# Patient Record
Sex: MALE | Race: BLACK OR AFRICAN AMERICAN | Employment: OTHER | URBAN - METROPOLITAN AREA
[De-identification: names, ages, dates, MRNs, and addresses within clinical notes are randomized per-mention and may not be internally consistent; named-entity substitution may affect disease eponyms.]

---

## 2018-02-28 ENCOUNTER — APPOINTMENT (OUTPATIENT)
Dept: GENERAL RADIOLOGY | Age: 48
DRG: 194 | End: 2018-02-28
Attending: PHYSICIAN ASSISTANT
Payer: MEDICAID

## 2018-02-28 ENCOUNTER — HOSPITAL ENCOUNTER (INPATIENT)
Age: 48
LOS: 7 days | Discharge: HOME HEALTH | DRG: 194 | End: 2018-03-07
Attending: EMERGENCY MEDICINE | Admitting: INTERNAL MEDICINE
Payer: MEDICAID

## 2018-02-28 DIAGNOSIS — N17.9 ACUTE RENAL FAILURE SUPERIMPOSED ON STAGE 3 CHRONIC KIDNEY DISEASE, UNSPECIFIED ACUTE RENAL FAILURE TYPE: ICD-10-CM

## 2018-02-28 DIAGNOSIS — I50.43 ACUTE ON CHRONIC COMBINED SYSTOLIC AND DIASTOLIC CONGESTIVE HEART FAILURE (HCC): Primary | ICD-10-CM

## 2018-02-28 DIAGNOSIS — N18.3 ACUTE RENAL FAILURE SUPERIMPOSED ON STAGE 3 CHRONIC KIDNEY DISEASE, UNSPECIFIED ACUTE RENAL FAILURE TYPE: ICD-10-CM

## 2018-02-28 DIAGNOSIS — E87.6 HYPOKALEMIA: ICD-10-CM

## 2018-02-28 DIAGNOSIS — D63.8 ANEMIA OF CHRONIC DISEASE: ICD-10-CM

## 2018-02-28 PROBLEM — N18.9 ACUTE ON CHRONIC RENAL INSUFFICIENCY: Status: ACTIVE | Noted: 2018-02-28

## 2018-02-28 PROBLEM — N28.9 ACUTE ON CHRONIC RENAL INSUFFICIENCY: Status: ACTIVE | Noted: 2018-02-28

## 2018-02-28 PROBLEM — E87.1 HYPONATREMIA: Status: ACTIVE | Noted: 2018-02-28

## 2018-02-28 PROBLEM — I50.9 ACUTE EXACERBATION OF CHF (CONGESTIVE HEART FAILURE) (HCC): Status: ACTIVE | Noted: 2018-02-28

## 2018-02-28 LAB
ALBUMIN SERPL-MCNC: 1.8 G/DL (ref 3.4–5)
ALBUMIN/GLOB SERPL: 0.4 {RATIO} (ref 0.8–1.7)
ALP SERPL-CCNC: 142 U/L (ref 45–117)
ALT SERPL-CCNC: 26 U/L (ref 16–61)
AMORPH CRY URNS QL MICRO: ABNORMAL
ANION GAP SERPL CALC-SCNC: 10 MMOL/L (ref 3–18)
APPEARANCE UR: CLEAR
APTT PPP: 32.9 SEC (ref 23–36.4)
AST SERPL-CCNC: 22 U/L (ref 15–37)
BACTERIA URNS QL MICRO: ABNORMAL /HPF
BASOPHILS # BLD: 0 K/UL (ref 0–0.06)
BASOPHILS NFR BLD: 0 % (ref 0–2)
BILIRUB SERPL-MCNC: 0.2 MG/DL (ref 0.2–1)
BILIRUB UR QL: NEGATIVE
BNP SERPL-MCNC: ABNORMAL PG/ML (ref 0–450)
BUN SERPL-MCNC: 42 MG/DL (ref 7–18)
BUN/CREAT SERPL: 14 (ref 12–20)
CALCIUM SERPL-MCNC: 7.5 MG/DL (ref 8.5–10.1)
CHLORIDE SERPL-SCNC: 120 MMOL/L (ref 100–108)
CK MB CFR SERPL CALC: 0.7 % (ref 0–4)
CK MB SERPL-MCNC: 4.1 NG/ML (ref 5–25)
CK SERPL-CCNC: 570 U/L (ref 39–308)
CO2 SERPL-SCNC: 17 MMOL/L (ref 21–32)
COLOR UR: YELLOW
CREAT SERPL-MCNC: 3.03 MG/DL (ref 0.6–1.3)
DIFFERENTIAL METHOD BLD: ABNORMAL
EOSINOPHIL # BLD: 0.2 K/UL (ref 0–0.4)
EOSINOPHIL NFR BLD: 3 % (ref 0–5)
EPITH CASTS URNS QL MICRO: ABNORMAL /LPF (ref 0–5)
ERYTHROCYTE [DISTWIDTH] IN BLOOD BY AUTOMATED COUNT: 14.1 % (ref 11.6–14.5)
EST. AVERAGE GLUCOSE BLD GHB EST-MCNC: 100 MG/DL
GLOBULIN SER CALC-MCNC: 4.9 G/DL (ref 2–4)
GLUCOSE BLD STRIP.AUTO-MCNC: 143 MG/DL (ref 70–110)
GLUCOSE BLD STRIP.AUTO-MCNC: 96 MG/DL (ref 70–110)
GLUCOSE SERPL-MCNC: 89 MG/DL (ref 74–99)
GLUCOSE UR STRIP.AUTO-MCNC: NEGATIVE MG/DL
HBA1C MFR BLD: 5.1 % (ref 4.5–5.6)
HCT VFR BLD AUTO: 25.3 % (ref 36–48)
HGB BLD-MCNC: 8.3 G/DL (ref 13–16)
HGB UR QL STRIP: ABNORMAL
INR PPP: 1.1 (ref 0.8–1.2)
KETONES UR QL STRIP.AUTO: NEGATIVE MG/DL
LEUKOCYTE ESTERASE UR QL STRIP.AUTO: NEGATIVE
LIPASE SERPL-CCNC: 117 U/L (ref 73–393)
LYMPHOCYTES # BLD: 1 K/UL (ref 0.9–3.6)
LYMPHOCYTES NFR BLD: 16 % (ref 21–52)
MCH RBC QN AUTO: 26.8 PG (ref 24–34)
MCHC RBC AUTO-ENTMCNC: 32.8 G/DL (ref 31–37)
MCV RBC AUTO: 81.6 FL (ref 74–97)
MONOCYTES # BLD: 0.6 K/UL (ref 0.05–1.2)
MONOCYTES NFR BLD: 9 % (ref 3–10)
NEUTS SEG # BLD: 4.7 K/UL (ref 1.8–8)
NEUTS SEG NFR BLD: 72 % (ref 40–73)
NITRITE UR QL STRIP.AUTO: NEGATIVE
PH UR STRIP: 5 [PH] (ref 5–8)
PLATELET # BLD AUTO: 237 K/UL (ref 135–420)
PMV BLD AUTO: 10 FL (ref 9.2–11.8)
POTASSIUM SERPL-SCNC: 3.2 MMOL/L (ref 3.5–5.5)
PROT SERPL-MCNC: 6.7 G/DL (ref 6.4–8.2)
PROT UR STRIP-MCNC: >1000 MG/DL
PROTHROMBIN TIME: 13.3 SEC (ref 11.5–15.2)
RBC # BLD AUTO: 3.1 M/UL (ref 4.7–5.5)
RBC #/AREA URNS HPF: ABNORMAL /HPF (ref 0–5)
SODIUM SERPL-SCNC: 147 MMOL/L (ref 136–145)
SP GR UR REFRACTOMETRY: 1.01 (ref 1–1.03)
TROPONIN I SERPL-MCNC: 0.02 NG/ML (ref 0–0.06)
UROBILINOGEN UR QL STRIP.AUTO: 0.2 EU/DL (ref 0.2–1)
WBC # BLD AUTO: 6.6 K/UL (ref 4.6–13.2)
WBC URNS QL MICRO: ABNORMAL /HPF (ref 0–5)

## 2018-02-28 PROCEDURE — 83036 HEMOGLOBIN GLYCOSYLATED A1C: CPT | Performed by: PHYSICIAN ASSISTANT

## 2018-02-28 PROCEDURE — 96374 THER/PROPH/DIAG INJ IV PUSH: CPT

## 2018-02-28 PROCEDURE — 99285 EMERGENCY DEPT VISIT HI MDM: CPT

## 2018-02-28 PROCEDURE — 85025 COMPLETE CBC W/AUTO DIFF WBC: CPT | Performed by: PHYSICIAN ASSISTANT

## 2018-02-28 PROCEDURE — 93005 ELECTROCARDIOGRAM TRACING: CPT

## 2018-02-28 PROCEDURE — 65660000000 HC RM CCU STEPDOWN

## 2018-02-28 PROCEDURE — 74011250636 HC RX REV CODE- 250/636: Performed by: INTERNAL MEDICINE

## 2018-02-28 PROCEDURE — 82962 GLUCOSE BLOOD TEST: CPT

## 2018-02-28 PROCEDURE — 71045 X-RAY EXAM CHEST 1 VIEW: CPT

## 2018-02-28 PROCEDURE — 83690 ASSAY OF LIPASE: CPT | Performed by: PHYSICIAN ASSISTANT

## 2018-02-28 PROCEDURE — 74011250637 HC RX REV CODE- 250/637: Performed by: INTERNAL MEDICINE

## 2018-02-28 PROCEDURE — 81001 URINALYSIS AUTO W/SCOPE: CPT | Performed by: PHYSICIAN ASSISTANT

## 2018-02-28 PROCEDURE — 74011250637 HC RX REV CODE- 250/637: Performed by: PHYSICIAN ASSISTANT

## 2018-02-28 PROCEDURE — 83880 ASSAY OF NATRIURETIC PEPTIDE: CPT | Performed by: PHYSICIAN ASSISTANT

## 2018-02-28 PROCEDURE — 82550 ASSAY OF CK (CPK): CPT | Performed by: PHYSICIAN ASSISTANT

## 2018-02-28 PROCEDURE — 85610 PROTHROMBIN TIME: CPT | Performed by: PHYSICIAN ASSISTANT

## 2018-02-28 PROCEDURE — 85730 THROMBOPLASTIN TIME PARTIAL: CPT | Performed by: PHYSICIAN ASSISTANT

## 2018-02-28 PROCEDURE — 80053 COMPREHEN METABOLIC PANEL: CPT | Performed by: PHYSICIAN ASSISTANT

## 2018-02-28 PROCEDURE — 74011250636 HC RX REV CODE- 250/636: Performed by: PHYSICIAN ASSISTANT

## 2018-02-28 RX ORDER — HEPARIN SODIUM 5000 [USP'U]/ML
5000 INJECTION, SOLUTION INTRAVENOUS; SUBCUTANEOUS EVERY 8 HOURS
Status: DISCONTINUED | OUTPATIENT
Start: 2018-02-28 | End: 2018-03-04

## 2018-02-28 RX ORDER — ACETAMINOPHEN 325 MG/1
650 TABLET ORAL
Status: DISCONTINUED | OUTPATIENT
Start: 2018-02-28 | End: 2018-03-07 | Stop reason: HOSPADM

## 2018-02-28 RX ORDER — POTASSIUM CHLORIDE 20 MEQ/1
40 TABLET, EXTENDED RELEASE ORAL
Status: COMPLETED | OUTPATIENT
Start: 2018-02-28 | End: 2018-02-28

## 2018-02-28 RX ORDER — CARVEDILOL 12.5 MG/1
25 TABLET ORAL ONCE
Status: COMPLETED | OUTPATIENT
Start: 2018-02-28 | End: 2018-02-28

## 2018-02-28 RX ORDER — FUROSEMIDE 10 MG/ML
80 INJECTION INTRAMUSCULAR; INTRAVENOUS
Status: COMPLETED | OUTPATIENT
Start: 2018-02-28 | End: 2018-02-28

## 2018-02-28 RX ORDER — CARVEDILOL 12.5 MG/1
25 TABLET ORAL 2 TIMES DAILY WITH MEALS
Status: DISCONTINUED | OUTPATIENT
Start: 2018-02-28 | End: 2018-03-07 | Stop reason: HOSPADM

## 2018-02-28 RX ORDER — POTASSIUM CHLORIDE 7.45 MG/ML
10 INJECTION INTRAVENOUS
Status: COMPLETED | OUTPATIENT
Start: 2018-02-28 | End: 2018-02-28

## 2018-02-28 RX ORDER — FUROSEMIDE 10 MG/ML
40 INJECTION INTRAMUSCULAR; INTRAVENOUS 2 TIMES DAILY
Status: COMPLETED | OUTPATIENT
Start: 2018-02-28 | End: 2018-03-01

## 2018-02-28 RX ADMIN — POTASSIUM CHLORIDE 40 MEQ: 20 TABLET, EXTENDED RELEASE ORAL at 16:04

## 2018-02-28 RX ADMIN — FUROSEMIDE 80 MG: 10 INJECTION, SOLUTION INTRAMUSCULAR; INTRAVENOUS at 11:34

## 2018-02-28 RX ADMIN — POTASSIUM CHLORIDE 40 MEQ: 20 TABLET, EXTENDED RELEASE ORAL at 13:50

## 2018-02-28 RX ADMIN — HEPARIN SODIUM 5000 UNITS: 5000 INJECTION, SOLUTION INTRAVENOUS; SUBCUTANEOUS at 22:08

## 2018-02-28 RX ADMIN — HEPARIN SODIUM 5000 UNITS: 5000 INJECTION, SOLUTION INTRAVENOUS; SUBCUTANEOUS at 16:05

## 2018-02-28 RX ADMIN — CARVEDILOL 25 MG: 12.5 TABLET, FILM COATED ORAL at 13:50

## 2018-02-28 RX ADMIN — FUROSEMIDE 40 MG: 10 INJECTION, SOLUTION INTRAMUSCULAR; INTRAVENOUS at 22:06

## 2018-02-28 RX ADMIN — CARVEDILOL 25 MG: 12.5 TABLET, FILM COATED ORAL at 17:54

## 2018-02-28 RX ADMIN — POTASSIUM CHLORIDE 10 MEQ: 10 INJECTION, SOLUTION INTRAVENOUS at 13:58

## 2018-02-28 NOTE — H&P
History & Physical    Patient: Arie Miller MRN: [id number]  CSN: [id number]    YOB: 1970  Age: 41 y.o. Sex: male      DOA: 2/28/2018  Primary Care Provider:  Deanna Almanza MD      Assessment/Plan     Patient Active Problem List   Diagnosis Code    Hyponatremia E87.1    Anemia of chronic disease D63.8    Acute on chronic renal insufficiency N28.9, N18.9    Acute exacerbation of CHF (congestive heart failure) ([facility]) I50.9       42 y/o male with advanced CKD and CHF with an exacerbation in his CHF. EF 60% in 7/17/17 at [facility] in [facility] tab. Less that perfect medication compliance noted. Recently missed diuretic doses as he was feeling poorly. 1.  CHF Diastolic Acute EF 22%. 2.  CKD stage 3-4.  3.  HTN. 4.  Anemia of chronic kidney disease. 5. Hx BKA. -Diurese with Furosemide. I/o. Daily weight.  -Follow renal fxn. ESRD probably not too far down the [facility] [address]. Should be followed bya nephrologist if possible. -BP mgmt.  -Anema of CKD noted. -Seems to be doing poorly overall.   -Dvt px.  -Dispo 3-4d. Estimated length of stay : 3d    CC: Dyspnea. HPI:     Arie Miller is a 41 y.o. male with a pmhx of chf, ckd, htn, and chronic anemia who presents with worsening lower extremity edema and dyspnea. Has been evolving over the past couple days. No associated chest pain. It appears he has taken his lasix sporadically. Report admission for similar problem in November 2017 at [facility].      In ER noted to have massive [place] [address]. CXR with interstitial edema and pleural effusion. Ths far in the ER he has received carvedilol and 80 mg of intravenous furosemide.        Past Medical History:   Diagnosis Date    Chronic kidney disease     Diabetes ([address]     Heart failure ([address]     Hypertension        Past Surgical History:   Procedure Laterality Date    HX ORTHOPAEDIC      right BKA 2017       History reviewed. No pertinent family history. Social History     Social History    Marital status:      Spouse name: N/A    Number of children: N/A    Years of education: N/A     Social History Main Topics    Smoking status: Former Smoker    Smokeless tobacco: Never Used    Alcohol use No    Drug use: No    Sexual activity: Not Asked     Other Topics Concern    None     Social History Narrative    None       Prior to Admission medications    Medication Sig Start Date End Date Taking? Authorizing Provider   FUROSEMIDE (LASIX PO) Take  by mouth. Yes Phys Other, MD       No Known Allergies    Review of Systems  Gen: No fever, chills, malaise, weight loss/gain. Heent: No headache, rhinorrhea, epistaxis, ear pain, hearing loss, sinus pain, neck pain/stiffness, sore throat. Heart: No chest pain, palpitations, FU, pnd, or orthopnea. Resp: see hpi   GI: No nausea, vomiting, diarrhea, constipation, melena or hematochezia. : No urinary obstruction, dysuria or hematuria. Derm: No rash, new skin lesion or pruritis. Musc/skeletal: no bone or joint complains. Vasc: No edema, cyanosis or claudication. Endo: No heat/cold intolerance, no polyuria,polydipsia or polyphagia. Neuro: No unilateral weakness, numbness, tingling. No seizures. Heme: No easy bruising or bleeding. Physical Exam:     Physical Exam:  Visit Vitals    /89    Pulse 96    Temp 98 °F (36.7 °C)    Resp 23    Ht 5' 7\" (1.702 m)    Wt 113.4 kg (250 lb)    SpO2 100%    BMI 39.16 kg/m2      O2 Device: Room air    Temp (24hrs), Av °F (36.7 °C), Min:98 °F (36.7 °C), Max:98 °F (36.7 °C)     07 -  1900  In: -   Out: 500 [Urine:500]        General:  Awake, cooperative, no distress. Head:  Normocephalic, without obvious abnormality, atraumatic. Eyes:  Conjunctivae/corneas clear, sclera anicteric, PERRL, EOMs intact. Nose: Nares normal. No drainage or sinus tenderness.    Throat: Lips, mucosa, and tongue normal.    Neck: Supple, symmetrical, trachea midline, no adenopathy. Lungs:   Rales b/l. Heart:  Regular rate and rhythm, S1, S2 normal, no murmur, click, rub or gallop. Abdomen: Soft, non-tender. Bowel sounds normal. No masses,  No organomegaly. Extremities: Edema. Pulses: 2+ and symmetric all extremities. Skin: Skin color pink/pale/mottled/flushed, turgor normal. No rashes or lesions   Neurologic: CNII-XII intact. No focal motor or sensory deficit. Labs Reviewed: All lab results for the last 24 hours reviewed. Recent Results (from the past 24 hour(s))   CBC WITH AUTOMATED DIFF    Collection Time: 02/28/18 10:50 AM   Result Value Ref Range    WBC 6.6 4.6 - 13.2 K/uL    RBC 3.10 (L) 4.70 - 5.50 M/uL    HGB 8.3 (L) 13.0 - 16.0 g/dL    HCT 25.3 (L) 36.0 - 48.0 %    MCV 81.6 74.0 - 97.0 FL    MCH 26.8 24.0 - 34.0 PG    MCHC 32.8 31.0 - 37.0 g/dL    RDW 14.1 11.6 - 14.5 %    PLATELET 878 862 - 906 K/uL    MPV 10.0 9.2 - 11.8 FL    NEUTROPHILS 72 40 - 73 %    LYMPHOCYTES 16 (L) 21 - 52 %    MONOCYTES 9 3 - 10 %    EOSINOPHILS 3 0 - 5 %    BASOPHILS 0 0 - 2 %    ABS. NEUTROPHILS 4.7 1.8 - 8.0 K/UL    ABS. LYMPHOCYTES 1.0 0.9 - 3.6 K/UL    ABS. MONOCYTES 0.6 0.05 - 1.2 K/UL    ABS. EOSINOPHILS 0.2 0.0 - 0.4 K/UL    ABS. BASOPHILS 0.0 0.0 - 0.06 K/UL    DF AUTOMATED     METABOLIC PANEL, COMPREHENSIVE    Collection Time: 02/28/18 10:50 AM   Result Value Ref Range    Sodium 147 (H) 136 - 145 mmol/L    Potassium 3.2 (L) 3.5 - 5.5 mmol/L    Chloride 120 (H) 100 - 108 mmol/L    CO2 17 (L) 21 - 32 mmol/L    Anion gap 10 3.0 - 18 mmol/L    Glucose 89 74 - 99 mg/dL    BUN 42 (H) 7.0 - 18 MG/DL    Creatinine 3.03 (H) 0.6 - 1.3 MG/DL    BUN/Creatinine ratio 14 12 - 20      GFR est AA 27 (L) >60 ml/min/1.73m2    GFR est non-AA 22 (L) >60 ml/min/1.73m2    Calcium 7.5 (L) 8.5 - 10.1 MG/DL    Bilirubin, total 0.2 0.2 - 1.0 MG/DL    ALT (SGPT) 26 16 - 61 U/L    AST (SGOT) 22 15 - 37 U/L    Alk. phosphatase 142 (H) 45 - 117 U/L    Protein, total 6.7 6.4 - 8.2 g/dL    Albumin 1.8 (L) 3.4 - 5.0 g/dL    Globulin 4.9 (H) 2.0 - 4.0 g/dL    A-G Ratio 0.4 (L) 0.8 - 1.7     NT-PRO BNP    Collection Time: 02/28/18 10:50 AM   Result Value Ref Range    NT pro-BNP 04287 (H) 0 - 450 PG/ML   CARDIAC PANEL,(CK, CKMB & TROPONIN)    Collection Time: 02/28/18 10:50 AM   Result Value Ref Range     (H) 39 - 308 U/L    CK - MB 4.1 (H) <3.6 ng/ml    CK-MB Index 0.7 0.0 - 4.0 %    Troponin-I, Qt. 0.02 0.00 - 0.06 NG/ML   HEMOGLOBIN A1C WITH EAG    Collection Time: 02/28/18 10:50 AM   Result Value Ref Range    Hemoglobin A1c 5.1 4.5 - 5.6 %    Est. average glucose 100 mg/dL   LIPASE    Collection Time: 02/28/18 10:50 AM   Result Value Ref Range    Lipase 117 73 - 393 U/L   PROTHROMBIN TIME + INR    Collection Time: 02/28/18 10:50 AM   Result Value Ref Range    Prothrombin time 13.3 11.5 - 15.2 sec    INR 1.1 0.8 - 1.2     PTT    Collection Time: 02/28/18 10:50 AM   Result Value Ref Range    aPTT 32.9 23.0 - 36.4 SEC   EKG, 12 LEAD, INITIAL    Collection Time: 02/28/18 10:52 AM   Result Value Ref Range    Ventricular Rate 95 BPM    Atrial Rate 95 BPM    P-R Interval 150 ms    QRS Duration 74 ms    Q-T Interval 380 ms    QTC Calculation (Bezet) 477 ms    Calculated P Axis 79 degrees    Calculated R Axis 30 degrees    Calculated T Axis -176 degrees    Diagnosis       Normal sinus rhythm  ST & T wave abnormality, consider lateral ischemia  Prolonged QT  Abnormal ECG  No previous ECGs available     URINALYSIS W/ RFLX MICROSCOPIC    Collection Time: 02/28/18  1:25 PM   Result Value Ref Range    Color YELLOW      Appearance CLEAR      Specific gravity 1.013 1.005 - 1.030      pH (UA) 5.0 5.0 - 8.0      Protein >1000 (A) NEG mg/dL    Glucose NEGATIVE  NEG mg/dL    Ketone NEGATIVE  NEG mg/dL    Bilirubin NEGATIVE  NEG      Blood MODERATE (A) NEG      Urobilinogen 0.2 0.2 - 1.0 EU/dL    Nitrites NEGATIVE  NEG      Leukocyte Esterase NEGATIVE  NEG               CC: Bharath Alston MD

## 2018-02-28 NOTE — IP AVS SNAPSHOT
[address] [address] 
[phone] Patient: Marco Mcdonald. MRN: [id number] YVM:7/90/1149 About your hospitalization You were admitted on:  February 28, 2018 You last received care in the:  [address] [place] [address] You were discharged on:  March 7, 2018 Why you were hospitalized Your primary diagnosis was:  Acute Exacerbation Of Chf (Congestive Heart Failure) (Hcc) Your diagnoses also included:  Hyponatremia, Anemia Of Chronic Disease, Acute On Chronic Renal Insufficiency, Iron Deficiency Anemia, Other Restrictive Cardiomyopathy (Hcc) Follow-up Information Follow up With Details Comments Contact Info [address] [facility] [place]note 2],[address] to continue managing your healthcare needs. [phone]  
 pcm Fredrick Mcarthur MD In 3 days   [address] [place] [facility] 
[phone] 
  
 nephrology In 1 week    
 cardiology In 1 week Discharge Orders None A check jennifer indicates which time of day the medication should be taken. My Medications START taking these medications Instructions Each Dose to Equal  
 Morning Noon Evening Bedtime  
 amLODIPine 10 mg tablet Commonly known as:  Sangeeta Slate Start taking on:  3/8/2018 Your last dose was: Your next dose is: Take 1 Tab by mouth daily. 10 mg  
    
   
   
   
  
 carvedilol 25 mg tablet Commonly known as:  Jeannie Knapp Your last dose was: Your next dose is: Take 1 Tab by mouth two (2) times daily (with meals). 25 mg  
    
   
   
   
  
 hydrALAZINE 25 mg tablet Commonly known as:  APRESOLINE Your last dose was: Your next dose is: Take 1 Tab by mouth three (3) times daily. 25 mg  
    
   
   
   
  
 isosorbide mononitrate ER 60 mg CR tablet Commonly known as:  IMDUR Start taking on:  3/8/2018 Your last dose was: Your next dose is: Take 1 Tab by mouth daily. 60 mg  
    
   
   
   
  
 loperamide 2 mg capsule Commonly known as:  IMODIUM Your last dose was: Your next dose is: Take 1 Cap by mouth three (3) times daily as needed for Diarrhea for up to 10 days. Indications: Diarrhea  
 2 mg Saccharomyces boulardii 250 mg capsule Commonly known as:  Osmani Villasenor Your last dose was: Your next dose is: Take 2 Caps by mouth two (2) times a day for 7 days. 500 mg CHANGE how you take these medications Instructions Each Dose to Equal  
 Morning Noon Evening Bedtime  
 furosemide 80 mg tablet Commonly known as:  LASIX What changed:   
- medication strength 
- how much to take - when to take this Your last dose was: Your next dose is: Take 1 Tab by mouth two (2) times a day. 80 mg Where to Get Your Medications These medications were sent to [address] [place] [address]. Shantal [address] [place] [address] Phone:  [phone]  
  amLODIPine 10 mg tablet  
 carvedilol 25 mg tablet  
 furosemide 80 mg tablet  
 hydrALAZINE 25 mg tablet  
 isosorbide mononitrate ER 60 mg CR tablet  
 loperamide 2 mg capsule Saccharomyces boulardii 250 mg capsule Discharge Instructions DISCHARGE SUMMARY from Nurse PATIENT INSTRUCTIONS: 
 
Recommended activity: Activity as tolerated. *  Please give a list of your current medications to your Primary Care Provider. *  Please update this list whenever your medications are discontinued, doses are 
    changed, or new medications (including over-the-counter products) are added. *  Please carry medication information at all times in case of emergency situations. These are general instructions for a healthy lifestyle: No smoking/ No tobacco products/ Avoid exposure to second hand smoke Surgeon General's Warning:  Quitting smoking now greatly reduces serious risk to your health. Obesity, smoking, and sedentary lifestyle greatly increases your risk for illness A healthy diet, regular physical exercise & weight monitoring are important for maintaining a healthy lifestyle You may be retaining fluid if you have a history of heart failure or if you experience any of the following symptoms:  Weight gain of 3 pounds or more overnight or 5 pounds in a week, increased swelling in our hands or feet or shortness of breath while lying flat in bed. Please call your doctor as soon as you notice any of these symptoms; do not wait until your next office visit. Recognize signs and symptoms of STROKE: 
 
F-face looks uneven A-arms unable to move or move unevenly S-speech slurred or non-existent T-time-call 911 as soon as signs and symptoms begin-DO NOT go Back to bed or wait to see if you get better-TIME IS BRAIN. Warning Signs of HEART ATTACK Call 911 if you have these symptoms: 
? Chest discomfort. Most heart attacks involve discomfort in the center of the chest that lasts more than a few minutes, or that goes away and comes back. It can feel like uncomfortable pressure, squeezing, fullness, or pain. ? Discomfort in other areas of the upper body. Symptoms can include pain or discomfort in one or both arms, the back, neck, jaw, or stomach. ? Shortness of breath with or without chest discomfort. ? Other signs may include breaking out in a cold sweat, nausea, or lightheadedness. Don't wait more than five minutes to call [address]! Fast action can save your life. Calling 911 is almost always the fastest way to get lifesaving treatment.  Emergency Medical Services staff can begin treatment when they arrive  up to an hour sooner than if someone gets to the hospital by car. The discharge information has been reviewed with the {PATIENT PARENT GUARDIAN:59772}. The {PATIENT PARENT GUARDIAN:94495} verbalized understanding. Discharge medications reviewed with the {Dishcarge meds reviewed NYFQ:22450} and appropriate educational materials and side effects teaching were provided. ___________________________________________________________________________________________________________________________________ Heart Failure: Care Instructions Your Care Instructions Heart failure occurs when your heart does not pump as much blood as the body needs. Failure does not mean that the heart has stopped pumping but rather that it is not pumping as well as it should. Over time, this causes fluid buildup in your lungs and other parts of your body. Fluid buildup can cause shortness of breath, fatigue, swollen ankles, and other problems. By taking medicines regularly, reducing sodium (salt) in your diet, checking your weight every day, and making lifestyle changes, you can feel better and live longer. Follow-up care is a key part of your treatment and safety. Be sure to make and go to all appointments, and call your doctor if you are having problems. It's also a good idea to know your test results and keep a list of the medicines you take. How can you care for yourself at home? Medicines ? · Be safe with medicines. Take your medicines exactly as prescribed. Call your doctor if you think you are having a problem with your medicine. ? · Do not take any vitamins, over-the-counter medicine, or herbal products without talking to your doctor first. Cem Erika not take ibuprofen (Advil or Motrin) and naproxen (Aleve) without talking to your doctor first. They could make your heart failure worse. ? · You may be taking some of the following medicine. ¨ Beta-blockers can slow heart rate, decrease blood pressure, and improve your condition. Taking a beta-blocker may lower your chance of needing to be hospitalized. ¨ Angiotensin-converting enzyme inhibitors (ACEIs) reduce the heart's workload, lower blood pressure, and reduce swelling. Taking an ACEI may lower your chance of needing to be hospitalized again. ¨ Angiotensin II receptor blockers (ARBs) work like ACEIs. Your doctor may prescribe them instead of ACEIs. ¨ Diuretics, also called water pills, reduce swelling. ¨ Potassium supplements replace this important mineral, which is sometimes lost with diuretics. ¨ Aspirin and other blood thinners prevent blood clots, which can cause a stroke or heart attack. ? You will get more details on the specific medicines your doctor prescribes. Diet ? · Your doctor may suggest that you limit sodium to 2,000 milligrams (mg) a day or less. That is less than 1 teaspoon of salt a day, including all the salt you eat in cooking or in packaged foods. People get most of their sodium from processed foods. Fast food and restaurant meals also tend to be very high in sodium. ? · Ask your doctor how much liquid you can drink each day. You may have to limit liquids. ?Weight ? · Weigh yourself without clothing at the same time each day. Record your weight. Call your doctor if you have a sudden weight gain, such as more than 2 to 3 pounds in a day or 5 pounds in a week. (Your doctor may suggest a different range of weight gain.) A sudden weight gain may mean that your heart failure is getting worse. ? Activity level ? · Start light exercise (if your doctor says it is okay). Even if you can only do a small amount, exercise will help you get stronger, have more energy, and manage your weight and your stress. Walking is an easy way to get exercise. Start out by walking a little more than you did before. Bit by bit, increase the amount you walk. ? · When you exercise, watch for signs that your heart is working too hard. You are pushing yourself too hard if you cannot talk while you are exercising. If you become short of breath or dizzy or have chest pain, stop, sit down, and rest.  
? · If you feel \"wiped out\" the day after you exercise, walk slower or for a shorter distance until you can work up to a better pace. ? · Get enough rest at night. Sleeping with 1 or 2 pillows under your upper body and head may help you breathe easier. ? Lifestyle changes ? · Do not smoke. Smoking can make a heart condition worse. If you need help quitting, talk to your doctor about stop-smoking programs and medicines. These can increase your chances of quitting for good. Quitting smoking may be the most important step you can take to protect your heart. ? · Limit alcohol to 2 drinks a day for men and 1 drink a day for women. Too much alcohol can cause health problems. ? · Avoid getting sick from colds and the flu. Get a pneumococcal vaccine shot. If you have had one before, ask your doctor whether you need another dose. Get a flu shot each year. If you must be around people with colds or the flu, wash your hands often. When should you call for help? Call 911 if you have symptoms of sudden heart failure such as: 
? · You have severe trouble breathing. ? · You cough up pink, foamy mucus. ? · You have a new irregular or rapid heartbeat. ?Call your doctor now or seek immediate medical care if: 
? · You have new or increased shortness of breath. ? · You are dizzy or lightheaded, or you feel like you may faint. ? · You have sudden weight gain, such as more than 2 to 3 pounds in a day or 5 pounds in a week. (Your doctor may suggest a different range of weight gain.) ? · You have increased swelling in your legs, ankles, or feet. ? · You are suddenly so tired or weak that you cannot do your usual activities. ?Watch closely for changes in your health, and be sure to contact your doctor if you develop new symptoms. Where can you learn more? Go to http://josé luis-anh.info/. Enter A276 in the search box to learn more about \"Heart Failure: Care Instructions. \" Current as of: September 21, 2016 Content Version: 11.4 © 2645-3592 gdgt. Care instructions adapted under license by [employer] (which disclaims liability or warranty for this information). If you have questions about a medical condition or this instruction, always ask your healthcare professional. [address] any warranty or liability for your use of this information. Chronic Kidney Disease: Care Instructions Your Care Instructions Chronic kidney disease happens when your kidneys don't work as well as they should. Your kidneys have a few important jobs. They remove waste from your blood. This waste leaves your body in your urine. They also balance your body's fluids and chemicals. When your kidneys don't work well, extra waste and fluid can build up. This can poison the body and sometimes cause death. The most common causes of this disease are diabetes and high blood pressure. In some cases, the disease develops in 2 to 3 months. But it usually develops over many years. If you take medicine and make healthy changes to your lifestyle, you may be able to prevent the disease from getting worse. But if your kidney damage gets worse, you may need dialysis or a kidney transplant. Dialysis uses a machine to filter waste from the blood. A transplant is surgery to give you a healthy kidney from another person. Follow-up care is a key part of your treatment and safety. Be sure to make and go to all appointments, and call your doctor if you are having problems. It's also a good idea to know your test results and keep a list of the medicines you take. How can you care for yourself at home? ? Treatments and appointments ? · Be safe with medicines. Take your medicines exactly as prescribed. Call your doctor if you have any problems with your medicine. You also may take medicine to control your blood pressure or to treat diabetes. Many people who have diabetes take blood pressure medicine. ? · If you have diabetes, do your best to keep your blood sugar in your target range. You may do this by eating healthy food and exercising. You may also take medicines. ? · Go to your dialysis appointments if you have this treatment. ? · Do not take ibuprofen (Advil, Motrin), naproxen (Aleve), or similar medicines, unless your doctor tells you to. These may make the disease worse. ? · Do not take any vitamins, over-the-counter medicines, or herbal products without talking to your doctor first.  
? · Do not smoke or use other tobacco products. Smoking can reduce blood flow to the kidneys. If you need help quitting, talk to your doctor about stop-smoking programs and medicines. These can increase your chances of quitting for good. ? · Do not drink alcohol or use illegal drugs. ? · Talk to your doctor about an exercise plan. Exercise helps lower your blood pressure. It also makes you feel better. ? · If you have an advance directive, let your doctor know. It may include a living will and a durable power of  for health care. If you don't have one, you may want to prepare one. It lets your doctor and loved ones know your health care wishes if you become unable to speak for yourself. Diet ? · Talk to a registered dietitian. He or she can help you make a meal plan that is right for you. Most people with kidney disease need to limit salt (sodium), fluids, and protein. Some also have to limit potassium and phosphorus. ? · You may have to give up many foods you like. But try to focus on the fact that this will help you stay healthy for as long as possible. ? · If you have a hard time eating enough, talk to your doctor or dietitian about ways to add calories to your diet. ? · Your diet may change as your disease changes. See your doctor for regular testing. And work with a dietitian to change your diet as needed. When should you call for help? Call 911 anytime you think you may need emergency care. For example, call if: 
? · You passed out (lost consciousness). ?Call your doctor now or seek immediate medical care if: 
? · You have less urine than normal or no urine. ? · You have trouble urinating or can urinate only very small amounts. ? · You are confused or have trouble thinking clearly. ? · You feel weaker or more tired than usual.  
? · You are very thirsty, lightheaded, or dizzy. ? · You have nausea and vomiting. ? · You have new swelling of your arms or feet, or your swelling is worse. ? · You have blood in your urine. ? · You have new or worse trouble breathing. ? Watch closely for changes in your health, and be sure to contact your doctor if: 
? · You have any problems with your medicine or other treatment. Where can you learn more? Go to http://josé luis-anh.info/. Enter N276 in the search box to learn more about \"Chronic Kidney Disease: Care Instructions. \" Current as of: May 12, 2017 Content Version: 11.4 © 3065-9151 [employer]. Care instructions adapted under license by [employer] (which disclaims liability or warranty for this information). If you have questions about a medical condition or this instruction, always ask your healthcare professional. [place] [address] any warranty or liability for your use of this information. [employer] Announcement We are excited to announce that we are making your provider's discharge notes available to you in [employer].   You will see these notes when they are completed and signed by the physician that discharged you from your recent hospital stay. If you have any questions or concerns about any information you see in [employer], please call the Health Information Department where you were seen or reach out to your Primary Care Provider for more information about your plan of care. Introducing [facility]! [address] introduces [employer] patient portal. Now you can access parts of your medical record, email your doctor's office, and request medication refills online. 1. In your internet browser, go to [email or web address] 2. Click on the First Time User? Click Here link in the Sign In box. You will see the New Member Sign Up page. 3. Enter your [employer] Access Code exactly as it appears below. You will not need to use this code after youve completed the sign-up process. If you do not sign up before the expiration date, you must request a new code. · [employer] Access Code: [email or web address] Expires: 6/5/2018  4:34 PM 
 
4. Enter the last four digits of your Social Security Number (xxxx) and Date of Birth (mm/dd/yyyy) as indicated and click Submit. You will be taken to the next sign-up page. 5. Create a [employer] ID. This will be your [employer] login ID and cannot be changed, so think of one that is secure and easy to remember. 6. Create a [employer] password. You can change your password at any time. 7. Enter your Password Reset Question and Answer. This can be used at a later time if you forget your password. 8. Enter your e-mail address. You will receive e-mail notification when new information is available in [address] 9. Click Sign Up. You can now view and download portions of your medical record. 10. Click the Download Summary menu link to download a portable copy of your medical information.  
 
If you have questions, please visit the Frequently Asked Questions section of the Employee Benefit Plans. Remember, MyChart is NOT to be used for urgent needs. For medical emergencies, dial 911. Now available from your iPhone and Android! Unresulted Labs-Please follow up with your PCP about these lab tests Order Current Status NUCLEAR STRESS TEST Preliminary result Providers Seen During Your Hospitalization Provider Specialty Primary office phone Franci Valdez MD Emergency Medicine [phone] Katherine Parker MD Internal Medicine [phone] Your Primary Care Physician (PCP) Primary Care Physician Office Phone Office Fax Shana Leary [phone] You are allergic to the following No active allergies Recent Documentation Height Weight BMI Smoking Status 1.702 m 105.3 kg 36.36 kg/m2 Former Smoker Emergency Contacts Name Discharge Info Relation Home Work Mobile Guido Lee DISCHARGE CAREGIVER [3] Other Relative [6] [phone] Patient Belongings The following personal items are in your possession at time of discharge: 
     Visual Aid: Glasses             Clothing: At bedside, Footwear, Pants, Shirt    Other Valuables: Wheelchair Please provide this summary of care documentation to your next provider. Signatures-by signing, you are acknowledging that this After Visit Summary has been reviewed with you and you have received a copy. Patient Signature:  ____________________________________________________________ Date:  ____________________________________________________________  
  
Lisa Singh Provider Signature:  ____________________________________________________________ Date:  ____________________________________________________________

## 2018-02-28 NOTE — IP AVS SNAPSHOT
[address] [place] [address] 
[phone] Patient: Tressa Hale. MRN: [id number] DSI:3/85/9223 A check jennifer indicates which time of day the medication should be taken. My Medications START taking these medications Instructions Each Dose to Equal  
 Morning Noon Evening Bedtime  
 amLODIPine 10 mg tablet Commonly known as:  Trimble Otter Start taking on:  3/8/2018 Your last dose was: Your next dose is: Take 1 Tab by mouth daily. 10 mg  
    
   
   
   
  
 carvedilol 25 mg tablet Commonly known as:  Cintia Sham Your last dose was: Your next dose is: Take 1 Tab by mouth two (2) times daily (with meals). 25 mg  
    
   
   
   
  
 hydrALAZINE 25 mg tablet Commonly known as:  APRESOLINE Your last dose was: Your next dose is: Take 1 Tab by mouth three (3) times daily. 25 mg  
    
   
   
   
  
 isosorbide mononitrate ER 60 mg CR tablet Commonly known as:  IMDUR Start taking on:  3/8/2018 Your last dose was: Your next dose is: Take 1 Tab by mouth daily. 60 mg  
    
   
   
   
  
 loperamide 2 mg capsule Commonly known as:  IMODIUM Your last dose was: Your next dose is: Take 1 Cap by mouth three (3) times daily as needed for Diarrhea for up to 10 days. Indications: Diarrhea  
 2 mg Saccharomyces boulardii 250 mg capsule Commonly known as:  Osmani Controls Your last dose was: Your next dose is: Take 2 Caps by mouth two (2) times a day for 7 days. 500 mg CHANGE how you take these medications Instructions Each Dose to Equal  
 Morning Noon Evening Bedtime  
 furosemide 80 mg tablet Commonly known as:  LASIX What changed:   
- medication strength 
- how much to take - when to take this Your last dose was: Your next dose is: Take 1 Tab by mouth two (2) times a day. 80 mg Where to Get Your Medications These medications were sent to [address] [place] [address], [address] [place] [address]. Shantal Curry, [employer] Phone:  [phone]  
  amLODIPine 10 mg tablet  
 carvedilol 25 mg tablet  
 furosemide 80 mg tablet  
 hydrALAZINE 25 mg tablet  
 isosorbide mononitrate ER 60 mg CR tablet  
 loperamide 2 mg capsule Saccharomyces boulardii 250 mg capsule

## 2018-02-28 NOTE — ED NOTES
Assumed care of patient. Patient presents with complaints of shortness of breath, fluid build-up and generalized weakness. Patient reports recently hospitalized at another facility with same issues. Reports taking Lasix, but has not helped. Patient changed into gown and warm blankets provided. Patient placed on cardiac monitor and continuous pulse ox. Call bell within reach of patient. Will continue to monitor and assess.

## 2018-02-28 NOTE — ROUTINE PROCESS
TRANSFER - OUT REPORT:    Verbal report given to NAHEED Kwan on Quintin Nascimento.  being transferred to 356 (telemetry unit) for routine progression of care       Report consisted of patients Situation, Background, Assessment and   Recommendations(SBAR). Information from the following report(s) SBAR, ED Summary, [facility] and Recent Results was reviewed with the receiving nurse. Lines:   Peripheral IV 02/28/18 Right Antecubital (Active)   Site Assessment Clean, dry, & intact 2/28/2018 10:57 AM   Phlebitis Assessment 0 2/28/2018 10:57 AM   Infiltration Assessment 0 2/28/2018 10:57 AM   Dressing Status Clean, dry, & intact 2/28/2018 10:57 AM   Dressing Type Transparent;Tape 2/28/2018 10:57 AM   Hub Color/Line Status Pink;Flushed;Patent 2/28/2018 10:57 AM        Opportunity for questions and clarification was provided.       Patient transported with:   Monitor  Registered Nurse

## 2018-02-28 NOTE — ED PROVIDER NOTES
EMERGENCY DEPARTMENT HISTORY AND PHYSICAL EXAM    Date: 2/28/2018  Patient Name: Phil De León. History of Presenting Illness     Chief Complaint   Patient presents with    Shortness of Breath         History Provided By: Patient    Chief Complaint: leg swelling  Duration: 1 Weeks  Timing:  Gradual and Worsening  Location: legs  Associated Symptoms: shortness of breath, orthopnea, fatigue    Additional History (Context):   10:47 AM   Phil De León. is a 41 y.o. 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 Associated sxs include fatigue, orthopnea, and SOB which is worse with exertion. Reports hx of similar sxs; dx'd with CHF and kidney disease. Pt has been taking Lasix BID but reports he did not take any yesterday due to feeling unwell. Pt states he was taken off of his DM medications. Reports he occasionally takes ASA and denies other blood thinner use. Pt is followed by Adeel Blair MD; last appointment 1 month ago. Denies cigarette use, etoh use, and illicit drug use. Pt denies fever, cough, chest pain, and any other sxs or complaints. PCP: Bharath Alston MD        Past History     Past Medical History:  Past Medical History:   Diagnosis Date    Chronic kidney disease     Diabetes ([address]     Heart failure ([address]     Hypertension        Past Surgical History:  Past Surgical History:   Procedure Laterality Date    HX ORTHOPAEDIC      right BKA 2017       Family History:  History reviewed. No pertinent family history. Social History:  Social History   Substance Use Topics    Smoking status: Former Smoker    Smokeless tobacco: Never Used    Alcohol use No       Allergies:  No Known Allergies      Review of Systems   Review of Systems   Constitutional: Positive for activity change (reports sleeping all day) and fatigue. Negative for chills and fever. HENT: Negative for congestion. Respiratory: Positive for shortness of breath (orthopnea). Negative for cough and wheezing. Cardiovascular: Positive for leg swelling (bilateral). Negative for chest pain. Gastrointestinal: Negative for abdominal pain, nausea and vomiting. Genitourinary: Negative for decreased urine volume and dysuria. Musculoskeletal: Negative for back pain. Skin: Negative for color change. Neurological: Negative for dizziness, light-headedness and headaches. Hematological: Negative for adenopathy. All other systems reviewed and are negative. Physical Exam     Vitals:    02/28/18 1114 02/28/18 1130 02/28/18 1134 02/28/18 1200   BP:  (!) 167/92 (!) 167/92 173/89   Pulse:  96 97 96   Resp:  25  23   Temp:       SpO2: 98% 98%  100%   Weight:       Height:         Physical Exam   Constitutional: He appears well-developed and well-nourished. He appears ill. AA male in NAD. HENT:   Head: Normocephalic and atraumatic. Right Ear: External ear normal.   Left Ear: External ear normal.   Nose: Nose normal.   Mouth/Throat: Oropharynx is clear and moist.   Eyes: Conjunctivae are normal.   Neck: Normal range of motion. Cardiovascular: Normal rate, regular rhythm, normal heart sounds and intact distal pulses. Exam reveals no gallop and no friction rub. No murmur heard. Pulses:       Dorsalis pedis pulses are 2+ on the left side. Posterior tibial pulses are 2+ on the left side. R BKA   Pulmonary/Chest: No accessory muscle usage. Tachypnea (mild) noted. No respiratory distress. He has decreased breath sounds in the right lower field and the left lower field. He has no wheezes. He has no rhonchi. He has no rales. Abdominal: Soft. Bowel sounds are normal. He exhibits no distension. There is no tenderness. Musculoskeletal: He exhibits edema (3+ edema in LLE, R below knee amputation). Neurological: He is alert. Skin: Skin is warm. He is not diaphoretic. Psychiatric: He has a normal mood and affect. Judgment normal.   Nursing note and vitals reviewed. Diagnostic Study Results     Labs -     Recent Results (from the past 12 hour(s))   CBC WITH AUTOMATED DIFF    Collection Time: 02/28/18 10:50 AM   Result Value Ref Range    WBC 6.6 4.6 - 13.2 K/uL    RBC 3.10 (L) 4.70 - 5.50 M/uL    HGB 8.3 (L) 13.0 - 16.0 g/dL    HCT 25.3 (L) 36.0 - 48.0 %    MCV 81.6 74.0 - 97.0 FL    MCH 26.8 24.0 - 34.0 PG    MCHC 32.8 31.0 - 37.0 g/dL    RDW 14.1 11.6 - 14.5 %    PLATELET 673 850 - 986 K/uL    MPV 10.0 9.2 - 11.8 FL    NEUTROPHILS 72 40 - 73 %    LYMPHOCYTES 16 (L) 21 - 52 %    MONOCYTES 9 3 - 10 %    EOSINOPHILS 3 0 - 5 %    BASOPHILS 0 0 - 2 %    ABS. NEUTROPHILS 4.7 1.8 - 8.0 K/UL    ABS. LYMPHOCYTES 1.0 0.9 - 3.6 K/UL    ABS. MONOCYTES 0.6 0.05 - 1.2 K/UL    ABS. EOSINOPHILS 0.2 0.0 - 0.4 K/UL    ABS. BASOPHILS 0.0 0.0 - 0.06 K/UL    DF AUTOMATED     METABOLIC PANEL, COMPREHENSIVE    Collection Time: 02/28/18 10:50 AM   Result Value Ref Range    Sodium 147 (H) 136 - 145 mmol/L    Potassium 3.2 (L) 3.5 - 5.5 mmol/L    Chloride 120 (H) 100 - 108 mmol/L    CO2 17 (L) 21 - 32 mmol/L    Anion gap 10 3.0 - 18 mmol/L    Glucose 89 74 - 99 mg/dL    BUN 42 (H) 7.0 - 18 MG/DL    Creatinine 3.03 (H) 0.6 - 1.3 MG/DL    BUN/Creatinine ratio 14 12 - 20      GFR est AA 27 (L) >60 ml/min/1.73m2    GFR est non-AA 22 (L) >60 ml/min/1.73m2    Calcium 7.5 (L) 8.5 - 10.1 MG/DL    Bilirubin, total 0.2 0.2 - 1.0 MG/DL    ALT (SGPT) 26 16 - 61 U/L    AST (SGOT) 22 15 - 37 U/L    Alk.  phosphatase 142 (H) 45 - 117 U/L    Protein, total 6.7 6.4 - 8.2 g/dL    Albumin 1.8 (L) 3.4 - 5.0 g/dL    Globulin 4.9 (H) 2.0 - 4.0 g/dL    A-G Ratio 0.4 (L) 0.8 - 1.7     NT-PRO BNP    Collection Time: 02/28/18 10:50 AM   Result Value Ref Range    NT pro-BNP 11362 (H) 0 - 450 PG/ML   CARDIAC PANEL,(CK, CKMB & TROPONIN)    Collection Time: 02/28/18 10:50 AM   Result Value Ref Range     (H) 39 - 308 U/L CK - MB 4.1 (H) <3.6 ng/ml    CK-MB Index 0.7 0.0 - 4.0 %    Troponin-I, Qt. 0.02 0.00 - 0.06 NG/ML   HEMOGLOBIN A1C WITH EAG    Collection Time: 02/28/18 10:50 AM   Result Value Ref Range    Hemoglobin A1c 5.1 4.5 - 5.6 %    Est. average glucose 100 mg/dL   LIPASE    Collection Time: 02/28/18 10:50 AM   Result Value Ref Range    Lipase 117 73 - 393 U/L   PROTHROMBIN TIME + INR    Collection Time: 02/28/18 10:50 AM   Result Value Ref Range    Prothrombin time 13.3 11.5 - 15.2 sec    INR 1.1 0.8 - 1.2     PTT    Collection Time: 02/28/18 10:50 AM   Result Value Ref Range    aPTT 32.9 23.0 - 36.4 SEC   EKG, 12 LEAD, INITIAL    Collection Time: 02/28/18 10:52 AM   Result Value Ref Range    Ventricular Rate 95 BPM    Atrial Rate 95 BPM    P-R Interval 150 ms    QRS Duration 74 ms    Q-T Interval 380 ms    QTC Calculation (Bezet) 477 ms    Calculated P Axis 79 degrees    Calculated R Axis 30 degrees    Calculated T Axis -176 degrees    Diagnosis       Normal sinus rhythm  ST & T wave abnormality, consider lateral ischemia  Prolonged QT  Abnormal ECG  No previous ECGs available     URINALYSIS W/ RFLX MICROSCOPIC    Collection Time: 02/28/18  1:25 PM   Result Value Ref Range    Color YELLOW      Appearance CLEAR      Specific gravity 1.013 1.005 - 1.030      pH (UA) 5.0 5.0 - 8.0      Protein >1000 (A) NEG mg/dL    Glucose NEGATIVE  NEG mg/dL    Ketone NEGATIVE  NEG mg/dL    Bilirubin NEGATIVE  NEG      Blood MODERATE (A) NEG      Urobilinogen 0.2 0.2 - 1.0 EU/dL    Nitrites NEGATIVE  NEG      Leukocyte Esterase NEGATIVE  NEG         Radiologic Studies -   CXR Results  (Last 48 hours)               02/28/18 1250  XR CHEST PORT Final result    Impression:  IMPRESSION:       Cardiomegaly with possible mild interstitial edema and small left effusion. Narrative:  EXAM: Portable chest       INDICATION: CHF       COMPARISON: None.       _______________       FINDINGS:       AP portable chest film was performed. Suboptimal inspiration. Heart is enlarged. There is central vascular congestion. Mild blunting left costophrenic angle. Mildly increased basilar interstitial markings. No definite focal consolidation. No pneumothorax. Healed right-sided rib fracture. _______________                 Medications given in the ED-  Medications   potassium chloride 10 mEq in 100 ml IVPB (not administered)   furosemide (LASIX) injection 80 mg (80 mg IntraVENous Given 2/28/18 1134)   carvedilol (COREG) tablet 25 mg (25 mg Oral Given 2/28/18 1350)   potassium chloride (K-DUR, KLOR-CON) SR tablet 40 mEq (40 mEq Oral Given 2/28/18 1350)         Medical Decision Making   I am the first provider for this patient. I reviewed the vital signs, available nursing notes, past medical history, past surgical history, family history and social history. Vital Signs-Reviewed the patient's vital signs. Pulse Oximetry Analysis - 100% on room air     Cardiac Monitor:  Rate: 94 bpm  Rhythm: NSR    EKG interpretation: (Preliminary)  10:52 AM   NSR. Rate 95 bpm. No ST elevation  EKG read by Marbin Pelaez PA-C     Records Reviewed: Nursing Notes and Old Medical Records     Reviewed most recent admission to [facility] in 11/2017 for acute on chronic kidney disease and CHF exacerbation. Patient was in heart failure at that time. Patient has a hx of DM with last A1C of 5.5. He also has HTN Rx Coreg (25 mg BIID) and Clonodine (0.3 mg TID). He has chronic anemia with a baseline hemoglobin of 8.4-9. Patient is Rx 40 mg Lasix Tuesday, Thursday, and Saturday. He is followed by Dr. Caridad Morris at [facility]. Patient had a renal US on 11/18/17 which showed medical renal disease, otherwise unremarkable. Most recent ECHO on 7/20/17, EF of 40% with diastolic dysfunction. Provider Notes (Medical Decision Making): ACS/MI, arrhythmia, COPD, CHF, PNA, PTX, asthma, anemia, metabolic derangement.  Doubt PE    Procedures:  Procedures    ED Course:   10:47 AM Initial assessment performed. The patients presenting problems have been discussed, and they are in agreement with the care plan formulated and outlined with them. I have encouraged them to ask questions as they arise throughout their visit. 12:35 PM Patient has acute on chronic kidney disease. Has chronic combined diastolic and systolic CHF. Will need diuresis and potassium replacement. Will consult with nephrology and consult medicine for admission. Pt has not taken any of his medications for a couple days. Will give Coreg which he should be on 25 mg BID per chart review. Will replace K+. 12:55 PM Discussed patient's history, exam, and available diagnostics results with Gonzalo Terry MD, nephrology, who agrees with treatment plan. He will see the patient. 1:22 PM Discussed patient's history, exam, and available diagnostics results with Willian Samuels MD, internal medicine, who agrees to admit the patient to Telemetry. Diagnosis and Disposition     Core Measures:  For Hospitalized Patients:    1. Hospitalization Decision Time:  The decision to hospitalize the patient was made by Robert Hare PA-C at 12:35 PM on 2/28/2018    2. Aspirin: Aspirin was not given because the patient did not present with a stroke at the time of their Emergency Department evaluation    1:23 PM  Patient is being admitted to the hospital by Willian Samuels MD to Telemetry. The results of their tests and reasons for their admission have been discussed with them and/or available family. They convey agreement and understanding for the need to be admitted and for their admission diagnosis. CONDITIONS ON ADMISSION:  Sepsis is not present at the time of admission. Deep Vein Thrombosis is not present at the time of admission. Thrombosis is not present at the time of admission. Pneumonia is not present at the time of admission. MRSA is not present at the time of admission. Wound infection is not present at the time of admission. Pressure Ulcer is not present at the time of admission. CLINICAL IMPRESSION:    1. Acute on chronic combined systolic and diastolic congestive heart failure ([facility]    2. Acute renal failure superimposed on stage 3 chronic kidney disease, unspecified acute renal failure type ([facility]    3. Hypokalemia    4. Anemia of chronic disease      _______________________________    Attestations: This note is prepared by Nelda Galvez, acting as Scribe for Margarita Leblanc PA-C. Margarita Leblanc PA-C:  The scribe's documentation has been prepared under my direction and personally reviewed by me in its entirety.   I confirm that the note above accurately reflects all work, treatment, procedures, and medical decision making performed by me.  _______________________________

## 2018-02-28 NOTE — ED NOTES
Patient incontinent of large amount of loose brown stool, pericare performed and linens changed at this time.

## 2018-02-28 NOTE — ED TRIAGE NOTES
Patient complaining of shortness of breath, weakness, and fluid build up. Patient reports he was recently seen at [facility] for same and diagnosed with heart failure and kidney disease. Patient reports he has been taking Lasix at home but has not been helping with symptoms. Sepsis Screening completed    (  )Patient meets SIRS criteria. (x)Patient does not meet SIRS criteria.       SIRS Criteria is achieved when two or more of the following are present   Temperature < 96.8°F (36°C) or > 100.9°F (38.3°C)   Heart Rate > 90 beats per minute   Respiratory Rate > 20 breaths per minute   WBC count > 12,000 or <4,000 or > 10% bands

## 2018-03-01 ENCOUNTER — APPOINTMENT (OUTPATIENT)
Dept: ULTRASOUND IMAGING | Age: 48
DRG: 194 | End: 2018-03-01
Attending: HOSPITALIST
Payer: MEDICAID

## 2018-03-01 LAB
ANION GAP SERPL CALC-SCNC: 10 MMOL/L (ref 3–18)
BASOPHILS # BLD: 0 K/UL (ref 0–0.06)
BASOPHILS NFR BLD: 0 % (ref 0–2)
BUN SERPL-MCNC: 42 MG/DL (ref 7–18)
BUN/CREAT SERPL: 14 (ref 12–20)
CALCIUM SERPL-MCNC: 7.2 MG/DL (ref 8.5–10.1)
CHLORIDE SERPL-SCNC: 120 MMOL/L (ref 100–108)
CO2 SERPL-SCNC: 17 MMOL/L (ref 21–32)
CREAT SERPL-MCNC: 3.07 MG/DL (ref 0.6–1.3)
DIFFERENTIAL METHOD BLD: ABNORMAL
EOSINOPHIL # BLD: 0.2 K/UL (ref 0–0.4)
EOSINOPHIL NFR BLD: 3 % (ref 0–5)
ERYTHROCYTE [DISTWIDTH] IN BLOOD BY AUTOMATED COUNT: 14.1 % (ref 11.6–14.5)
GLUCOSE BLD STRIP.AUTO-MCNC: 113 MG/DL (ref 70–110)
GLUCOSE BLD STRIP.AUTO-MCNC: 87 MG/DL (ref 70–110)
GLUCOSE BLD STRIP.AUTO-MCNC: 89 MG/DL (ref 70–110)
GLUCOSE BLD STRIP.AUTO-MCNC: 95 MG/DL (ref 70–110)
GLUCOSE SERPL-MCNC: 83 MG/DL (ref 74–99)
HCT VFR BLD AUTO: 24 % (ref 36–48)
HGB BLD-MCNC: 7.8 G/DL (ref 13–16)
LYMPHOCYTES # BLD: 0.9 K/UL (ref 0.9–3.6)
LYMPHOCYTES NFR BLD: 18 % (ref 21–52)
MAGNESIUM SERPL-MCNC: 1.3 MG/DL (ref 1.6–2.6)
MCH RBC QN AUTO: 26.7 PG (ref 24–34)
MCHC RBC AUTO-ENTMCNC: 32.5 G/DL (ref 31–37)
MCV RBC AUTO: 82.2 FL (ref 74–97)
MONOCYTES # BLD: 0.5 K/UL (ref 0.05–1.2)
MONOCYTES NFR BLD: 9 % (ref 3–10)
NEUTS SEG # BLD: 3.8 K/UL (ref 1.8–8)
NEUTS SEG NFR BLD: 70 % (ref 40–73)
PLATELET # BLD AUTO: 207 K/UL (ref 135–420)
PMV BLD AUTO: 11 FL (ref 9.2–11.8)
POTASSIUM SERPL-SCNC: 3.7 MMOL/L (ref 3.5–5.5)
RBC # BLD AUTO: 2.92 M/UL (ref 4.7–5.5)
SODIUM SERPL-SCNC: 147 MMOL/L (ref 136–145)
WBC # BLD AUTO: 5.4 K/UL (ref 4.6–13.2)

## 2018-03-01 PROCEDURE — 83735 ASSAY OF MAGNESIUM: CPT | Performed by: INTERNAL MEDICINE

## 2018-03-01 PROCEDURE — 85025 COMPLETE CBC W/AUTO DIFF WBC: CPT | Performed by: HOSPITALIST

## 2018-03-01 PROCEDURE — 82962 GLUCOSE BLOOD TEST: CPT

## 2018-03-01 PROCEDURE — 97530 THERAPEUTIC ACTIVITIES: CPT

## 2018-03-01 PROCEDURE — 74011250636 HC RX REV CODE- 250/636: Performed by: INTERNAL MEDICINE

## 2018-03-01 PROCEDURE — 74011250636 HC RX REV CODE- 250/636: Performed by: HOSPITALIST

## 2018-03-01 PROCEDURE — 76770 US EXAM ABDO BACK WALL COMP: CPT

## 2018-03-01 PROCEDURE — 74011250637 HC RX REV CODE- 250/637: Performed by: INTERNAL MEDICINE

## 2018-03-01 PROCEDURE — 36415 COLL VENOUS BLD VENIPUNCTURE: CPT | Performed by: INTERNAL MEDICINE

## 2018-03-01 PROCEDURE — 65660000000 HC RM CCU STEPDOWN

## 2018-03-01 PROCEDURE — 80048 BASIC METABOLIC PNL TOTAL CA: CPT | Performed by: INTERNAL MEDICINE

## 2018-03-01 PROCEDURE — 74011250637 HC RX REV CODE- 250/637: Performed by: PHYSICIAN ASSISTANT

## 2018-03-01 PROCEDURE — 97162 PT EVAL MOD COMPLEX 30 MIN: CPT

## 2018-03-01 PROCEDURE — 93306 TTE W/DOPPLER COMPLETE: CPT

## 2018-03-01 RX ORDER — MAGNESIUM SULFATE HEPTAHYDRATE 40 MG/ML
2 INJECTION, SOLUTION INTRAVENOUS ONCE
Status: COMPLETED | OUTPATIENT
Start: 2018-03-01 | End: 2018-03-01

## 2018-03-01 RX ORDER — NYSTATIN 100000 [USP'U]/G
POWDER TOPICAL 2 TIMES DAILY
Status: DISCONTINUED | OUTPATIENT
Start: 2018-03-01 | End: 2018-03-07 | Stop reason: HOSPADM

## 2018-03-01 RX ORDER — FUROSEMIDE 10 MG/ML
80 INJECTION INTRAMUSCULAR; INTRAVENOUS EVERY 12 HOURS
Status: DISCONTINUED | OUTPATIENT
Start: 2018-03-01 | End: 2018-03-06

## 2018-03-01 RX ORDER — MAGNESIUM SULFATE 1 G/100ML
1 INJECTION INTRAVENOUS ONCE
Status: ACTIVE | OUTPATIENT
Start: 2018-03-01 | End: 2018-03-02

## 2018-03-01 RX ADMIN — HEPARIN SODIUM 5000 UNITS: 5000 INJECTION, SOLUTION INTRAVENOUS; SUBCUTANEOUS at 06:26

## 2018-03-01 RX ADMIN — MAGNESIUM SULFATE HEPTAHYDRATE 2 G: 40 INJECTION, SOLUTION INTRAVENOUS at 14:32

## 2018-03-01 RX ADMIN — NYSTATIN: 100000 POWDER TOPICAL at 22:27

## 2018-03-01 RX ADMIN — CARVEDILOL 25 MG: 12.5 TABLET, FILM COATED ORAL at 12:30

## 2018-03-01 RX ADMIN — FUROSEMIDE 80 MG: 10 INJECTION, SOLUTION INTRAMUSCULAR; INTRAVENOUS at 15:50

## 2018-03-01 RX ADMIN — FUROSEMIDE 80 MG: 10 INJECTION, SOLUTION INTRAMUSCULAR; INTRAVENOUS at 22:27

## 2018-03-01 RX ADMIN — HEPARIN SODIUM 5000 UNITS: 5000 INJECTION, SOLUTION INTRAVENOUS; SUBCUTANEOUS at 22:26

## 2018-03-01 RX ADMIN — CARVEDILOL 25 MG: 12.5 TABLET, FILM COATED ORAL at 16:44

## 2018-03-01 RX ADMIN — FUROSEMIDE 40 MG: 10 INJECTION, SOLUTION INTRAMUSCULAR; INTRAVENOUS at 12:30

## 2018-03-01 RX ADMIN — HEPARIN SODIUM 5000 UNITS: 5000 INJECTION, SOLUTION INTRAVENOUS; SUBCUTANEOUS at 14:32

## 2018-03-01 NOTE — PROGRESS NOTES
Hospitalist Progress Note    Patient: Sanjeev Gomez. MRN: [id number]  CSN: [id number]    YOB: 1970  Age: 41 y.o. Sex: male    DOA: 2/28/2018 LOS:  LOS: 1 day          Chief Complaint:    Dyspnea    Assessment/Plan     1. Acute Diastolic CHF Exacerbation  2. DUSTY on CKD 3-4  3. HTN  4. Anemia of Chronic Disease  5. Hx of BKA  6. Hypernatremia  7. Hypomagnesemia    1. Echo with decreased EF and grade 3 diastolic dysfunction. Will continue IV lasix intermittently to diurese the patient. Monitor fluid status in the interim. Nephrology recommends if IV lasix unsatisfactory to pursue lasix drip +/- Metolazone. Continue to monitor I/O.   2. As above, diuresis. Monitor I/O and chemistry daily. Appreciate nephrology consult by Dr Maria Esther Espinoza. Renal US shows no evidence of hydronephrosis, but does show increased renal parenchymal echogenicity in keeping with medical kidney disease. 3. Continue coreg, BP is moderately controlled. If persistent elevation, may add second agent. Continue to monitor. 4. Per nephrology likely secondary to CKD, may need EVIE. Will follow H/H daily with CBC and monitor. 6. Mild, will follow with daily BMP. 7. Magnesium replacement ordered. Patient has DM listed in his chart, however A1c is 5.1% upon admission. Will continue to follow BGL while hospitalized and intervene if necessary. Also complaining of new visual disturbances involving his right eye. Ordered CT head noncontrast, informed that CT scanner was down and would be delayed until CT is running again. DVT Prophylaxis - Heparin  Dispo: 2-3 days.       Patient Active Problem List   Diagnosis Code    Hyponatremia E87.1    Anemia of chronic disease D63.8    Acute on chronic renal insufficiency N28.9, N18.9    Acute exacerbation of CHF (congestive heart failure) (HCC) I50.9       Subjective:    Complaints of \"spots in his right eye\"    Review of systems:    Constitutional: denies fevers, chills, myalgias  Respiratory: denies SOB, cough  Cardiovascular: denies chest pain, palpitations  Gastrointestinal: denies nausea, vomiting, diarrhea      Vital signs/Intake and Output:  Visit Vitals    /87    Pulse 83    Temp 97.2 °F (36.2 °C)    Resp 16    Ht 5' 7\" (1.702 m)    Wt 110.8 kg (244 lb 4.3 oz)    SpO2 100%    BMI 38.26 kg/m2     Current Shift:     Last three shifts:  02/27 1901 - 03/01 0700  In: 910 [P.O.:910]  Out: 2200 [Urine:2200]    Exam:    General: Chronically ill appearing male, alert, NAD, OX3  Head/Neck: NCAT, supple, No masses, No lymphadenopathy  CVS:Regular rate and rhythm, no M/R/G, S1/S2 heard, no thrill  Lungs:Rales bilateral bases. Coarse lung sounds bilat. Abdomen: Soft, Nontender, No distention, Normal Bowel sounds, No hepatomegaly  Extremities: 2+ edema. S/P R BKA  Skin:normal texture and turgor, no rashes, no lesions  Neuro:grossly normal , follows commands  Psych:appropriate                Labs: Results:       Chemistry Recent Labs      03/01/18   0609  02/28/18   1050   GLU  83  89   NA  147*  147*   K  3.7  3.2*   CL  120*  120*   CO2  17*  17*   BUN  42*  42*   CREA  3.07*  3.03*   CA  7.2*  7.5*   AGAP  10  10   BUCR  14  14   AP   --   142*   TP   --   6.7   ALB   --   1.8*   GLOB   --   4.9*   AGRAT   --   0.4*      CBC w/Diff Recent Labs      03/01/18   0609  02/28/18   1050   WBC  5.4  6.6   RBC  2.92*  3.10*   HGB  7.8*  8.3*   HCT  24.0*  25.3*   PLT  207  237   GRANS  70  72   LYMPH  18*  16*   EOS  3  3      Cardiac Enzymes Recent Labs      02/28/18   1050   CPK  570*   CKND1  0.7      Coagulation Recent Labs      02/28/18   1050   PTP  13.3   INR  1.1   APTT  32.9       Lipid Panel No results found for: CHOL, CHOLPOCT, CHOLX, CHLST, CHOLV, 687742, HDL, LDL, LDLC, DLDLP, 518299, VLDLC, VLDL, TGLX, TRIGL, TRIGP, TGLPOCT, CHHD, CHHDX   BNP No results for input(s): BNPP in the last 72 hours.    Liver Enzymes Recent Labs      02/28/18   1050   TP  6.7   ALB  1.8* AP  142*   SGOT  22      Thyroid Studies No results found for: T4, T3U, TSH, TSHEXT     Procedures/imaging: see electronic medical records for all procedures/Xrays and details which were not copied into this note but were reviewed prior to creation of [address], PA-C

## 2018-03-01 NOTE — PROGRESS NOTES

## 2018-03-01 NOTE — ROUTINE PROCESS
Bedside shift change report given to Alicia Lockett RN (oncoming nurse) by Coby Raines RN (offgoing nurse). Report included the following information SBAR, Kardex and MAR.

## 2018-03-01 NOTE — PROGRESS NOTES
1930:  Bedside and Verbal shift change report received from Jonah Buck RN (offgoing nurse) to Leah Tristan RN (oncoming nurse). Report included the following information SBAR, Kardex, Intake/Output, MAR, Recent Results and Cardiac Rhythm SR. Pt resting in bed. Appears to be in no distress at this time. Call bell within reach. Zone phone number given to pt. Pt instructed to call zone phone or call bell if needed. Pt instructed to call for assistance before ambulating. 6848:  Bedside and Verbal shift change report given to Larisa Castle RN (oncoming nurse) by Leah Tristan RN (offgoing nurse).  Report included the following information SBAR, Kardex, Intake/Output, MAR, Recent Results, Med Rec Status and Cardiac Rhythm SR.

## 2018-03-01 NOTE — CONSULTS
Nephrology Consult    Patient: Ta Landaverde. Requesting physician: Dr. Fabiola Daugherty  Reason for consult:         History of Present Illness:  Ta Hall is a 41 y.o. male with a past medical history significant for DM, HTN, Sickle Cell trait, diastolic CHF, s/p R BKA, CKD who presented with c/o worsening SOB ~ 2 weeks. Initial evaluation consistent with CHF decompensation. Labs revealed a S Cr of 3.0, S. Alb 1.8 and Anemia Hgb 8.3. Patient admitted and given IV diuretic Furosemide. Review of records from recent hospitalization at [facility] in 2017 reveals a S Cr in range of 2.5 to 2.8. Serologies including Hep B and C,as well as HIV were neg., Complements C3 and C4 were normal. Serum and Urine Protein Electrophoresis did not reveal monoclonal spike. 24 hr Urine Protein was 2.2 gms. PTH was normal.   ECHO showed EF 60%. Back in Aug 2017,  S Cr was in the range of 1.8 to 2.2. SALEEM was pos at a titer of 1: 40 speckled pattern. Anti DS-DNA and Anti-Smith Ab were neg. Patient denies any recent use of NSAIDs or IV contrast exposure. Past Medical History:  Patient Active Problem List   Diagnosis Code    Hyponatremia E87.1    Anemia of chronic disease D63.8    Acute on chronic renal insufficiency N28.9, N18.9    Acute exacerbation of CHF (congestive heart failure) (HCC) I50.9         Social History:  Social History     Social History    Marital status:      Spouse name: N/A    Number of children: N/A    Years of education: N/A     Occupational History    Not on file. Social History Main Topics    Smoking status: Former Smoker    Smokeless tobacco: Never Used    Alcohol use No    Drug use: No    Sexual activity: Not on file     Other Topics Concern    Not on file     Social History Narrative    No narrative on file       Family History:  Father  of complications from Diabetes Mellitus. Mother had Sickle Cell disease.  No known FH Kidney disease    Allergy:  No Known Allergies     Medication:      Inpatient meds:  Current Facility-Administered Medications   Medication Dose Route Frequency    magnesium sulfate 2 g/50 ml IVPB (premix or compounded)  2 g IntraVENous ONCE    And    magnesium sulfate 1 g/100 ml IVPB (premix or compounded)  1 g IntraVENous ONCE    acetaminophen (TYLENOL) tablet 650 mg  650 mg Oral Q4H PRN    heparin (porcine) injection 5,000 Units  5,000 Units SubCUTAneous Q8H    carvedilol (COREG) tablet 25 mg  25 mg Oral BID WITH MEALS    acetaminophen (TYLENOL) tablet 650 mg  650 mg Oral Q6H PRN                        Review of Systems:  Gen: no fever or weakness  Head: no headache or trauma  Eyes: no change in vision  Throat/Neck: no sore throat or dysphagia  CV: no chest pain or palpitation. + SOB + orthopnea  Pulm: no cough or hemoptysis  GI: no nausea, vomiting or diarrhea  : no dysuria or hematuria  Skin: no new rash or lesions  Endocrine: no hot or cold intolerance      Vital signs:   Visit Vitals    BP (!) 158/92    Pulse 86    Temp 98.1 °F (36.7 °C)    Resp 17    Ht 5' 7\" (1.702 m)    Wt 110.8 kg (244 lb 4.3 oz)    SpO2 100%    BMI 38.26 kg/m2         Intake/Output Summary (Last 24 hours) at 03/01/18 1336  Last data filed at 03/01/18 8367   Gross per 24 hour   Intake              910 ml   Output             2200 ml   Net            -1290 ml       Physical Exam:    General: No acute distress   HENT: Atraumatic and normocephalic   Eyes: Normal conjunctiva   Neck: Supple . No JVD   Cardiovascular: Normal S1 & S2, no m/r/g   Pulmonary/Chest Wall: Clear to auscultation bilaterally   Abdominal: Soft and non-tender   Musculoskeletal: 2+  Edema  LLE with chronic skin changes. S/p R BKA   Neurological: AA and O X 3.  No focal deficits       Data Review:  Recent Labs      03/01/18   0609  02/28/18   1050   NA  147*  147*   K  3.7  3.2*   CL  120*  120*   CO2  17*  17*   BUN  42*  42*   CREA  3.07*  3.03*   GLU  83  89   CA  7.2*  7.5*     Recent Labs 03/01/18   0609  02/28/18   1050   WBC  5.4  6.6   HGB  7.8*  8.3*   HCT  24.0*  25.3*   PLT  207  237     Recent Labs      02/28/18   1050   SGOT  22   AP  142*   TP  6.7   ALB  1.8*   GLOB  4.9*     Recent Labs      02/28/18   1050   INR  1.1   PTP  13.3   APTT  32.9      No results for input(s): IRON, FE, TIBC, IBCT, PSAT, FERR in the last 72 hours. CXR:     IMPRESSION:     Cardiomegaly with possible mild interstitial edema and small left effusion.        Kidney ultrasound:    pending      Impression:     1. Acute Kidney Injury on CKD 3/4, likely sec to CHF decompensation with decreased Renal perfusion vs progression of CKD  2. CKD3/4 with proteinuria likely sec to Diabetic Nephropathy/Hypertensive Kidney disease. Previous work up at [facility] in 2017 noted. Serologies, SPEP, UPEP, largely unremarkable except for+ SALEEM at a low titer. 3. Diastolic CHF decompensation  4. Anemia , likely sec to CKD  5. Metabolic Acidosis  6. Hypernatremia, mild  7. Hypoalbuminemia  8. DM  9. HTN  10. S/p R BKA    Plan:     1. Start Intermittent IV Lasix, if response not satisfactory will switch to Lasix drip +/- Metolazone for synergy  2. Monitor I/0's  3. Monitor chemistry  4. Bladder scan  5. F/u on Renal U/S study  6. Check TSAT, Ferritin, PTH, Phos  7. Patient will likely need EVIE for Anemia. 8. Would avoid potential nephrotoxic exposure including IV contrast dye or NSAIDs  9. No urgent indication for HD presently  but seems to be heading in that direction with progressive deterioration of renal function over the past few months and fluid retention/CHF. Discussed with patient. Thank you for the consultation. .  We will follow the patient along with the medical team.    Dionicio Downs MD, MD  [address]  [phone]

## 2018-03-01 NOTE — PROGRESS NOTES
Problem: Falls - Risk of  Goal: *Absence of Falls  Document Vanessa Fall Risk and appropriate interventions in the flowsheet.    Outcome: Progressing Towards Goal  Fall Risk Interventions:  Mobility Interventions: Patient to call before getting OOB, Utilize walker, cane, or other assitive device         Medication Interventions: Assess postural VS orthostatic hypotension, Patient to call before getting OOB, Teach patient to arise slowly    Elimination Interventions: Call light in reach, Patient to call for help with toileting needs

## 2018-03-01 NOTE — PROGRESS NOTES
Chart reviewed. Pt admitted to hospital for CHF. CM will follow for discharge planning needs. 1055  Met with patient and his sister at bedside. Pt gave verbal consent to discuss care in front of sister. Pt states he lives with his brother. Pt states he has wheelchair and RW at home for use. Pt states he sees MD Friend at [facility] (last appt 1 month ago). Pt states he is currently self pay, but medicaid pending. Pt states he gets disability. Pt offered FOC for [facility], but chose [facility] since he is self pay. Pt states he previously was in [place]. CM will cont to follow. [address] Pt noted to be wearing oxygen. Pts states he does not wear oxygen at home. Plan is to wean pt from oxygen. Nursing, please conduct walk test if pt will require oxygen at discharge. Readmission Risk Assessment: Low Risk and MSSP/Good Help ACO patients    RRAT Score: 1 - 12    Initial Assessment:  Per chart, pt is a 41 y.o. 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 Associated sxs include fatigue, orthopnea, and SOB which is worse with exertion. Reports hx of similar sxs; dx'd with CHF and kidney disease. Pt has been taking Lasix BID but reports he did not take any yesterday due to feeling unwell. Pt states he was taken off of his DM medications. Reports he occasionally takes ASA and denies other blood thinner use. Pt is followed by Jae Cole MD; last appointment 1 month ago. Emergency Contact:    Name Relation Home Work [address] [place] [address] [address] Other Relative [phone]      Pertinent Medical Hx:   DM, CHF, Right BKA    PCP/Specialists:  PCP Friend    Community Services:     DME:     Low Risk Care Transition Plan:  1. Evaluate for [facility] or [facility] care coordination of resources  2.  Involve patient/caregiver in assessment, planning, education and implement of intervention. 3. CM daily patient care huddles/interdisciplinary rounds. 4. PCP/Specialist appointment within 7 - 10 days made prior to discharge. 5. Facilitate transportation and logistics for follow-up appointments. 6. Handoff to [facility] Nurse Navigator or PCP practice    Care Management Interventions  Transition of Care Consult (CM Consult): Discharge Planning  Health Maintenance Reviewed:  Yes

## 2018-03-01 NOTE — PROGRESS NOTES
Problem: Mobility Impaired (Adult and Pediatric)  Goal: *Acute Goals and Plan of Care (Insert Text)  Physical Therapy Goals LT/ST  Initiated 3/1/2018 and to be accomplished within 5-7 day(s)  1. Patient will move from supine <> sit with S in prep for out of bed activity and change of position. 2.  Patient will perform sit<> stand with CGA/S with LRAD in prep for transfers/ambulation. 3.  Patient will transfer from bed <> chair with CGA/S with LRAD for time up in chair for completion of ADL activity. 4.  Patient will ambulate >50 feet with LRAD/Min-CGA for improved functional mobility/safe discharge. .     Outcome: Progressing Towards Goal  physical Therapy EVALUATION    Patient: Marco Carlin (40 y.o. male)  Date: 3/1/2018  Primary Diagnosis: Acute exacerbation of CHF (congestive heart failure) (HCC)  Acute on chronic renal insufficiency  Hyponatremia  Anemia of chronic disease        Precautions:  Fall    ASSESSMENT :  Based on the objective data described below, the patient presents with decrease independence w/ bed mobility, transfers, and gait. Pt seen in supine in bed prior to session w/ supplemental O2 and telemonitor. Pt reports no pain prior to session but continues to c/o SOB. O2 levels assessed prior to session at 98 on supplemental O2. Pt demonstrates L LE edema, that is tender to palpate, appears dry and scaly, with dark coloration. Pt also has a R LE prostetic. Pt reports PTA that he lives in an apartment w/ brother, but is mostly alone as pt's brother works. Pt reports that he recently came from rehab at [facility] in November, he feels that rehab helped however every since he has left rehab he has been declining. Pt reports that he has had several falls since he has left rehab secondary to LOB. Pt able to sit at the EOB w/min VCing. Attempted to stand pt, however pt refused, despite motivation pt continued to refuse secondary to decrease activity tolerance and continuous c/o SOB.  Pt also reports he has a FOF as well. Pt tranferred back to supine in bed after session, call bell and tray in reach, nurse notified after session. Patient will benefit from skilled intervention to address the above impairments. Patients rehabilitation potential is considered to be Fair  Factors which may influence rehabilitation potential include:   []         None noted  []         Mental ability/status  [x]         Medical condition  [x]         Home/family situation and support systems  [x]         Safety awareness  [x]         Pain tolerance/management  []         Other:      PLAN :  Recommendations and Planned Interventions:  [x]           Bed Mobility Training             [x]    Neuromuscular Re-Education  [x]           Transfer Training                   []    Orthotic/Prosthetic Training  [x]           Gait Training                          []    Modalities  [x]           Therapeutic Exercises          []    Edema Management/Control  [x]           Therapeutic Activities            [x]    Patient and Family Training/Education  []           Other (comment):    Frequency/Duration: Patient will be followed by physical therapy once/twice daily to address goals. Discharge Recommendations: Rehab  Further Equipment Recommendations for Discharge: TBD     SUBJECTIVE:   Patient stated I just feel really tired right now.     OBJECTIVE DATA SUMMARY:     Past Medical History:   Diagnosis Date    Chronic kidney disease     Diabetes ([facility] [place] [address]     Heart failure ([facility] [address]     Hypertension      Past Surgical History:   Procedure Laterality Date    HX ORTHOPAEDIC      right BKA 2017     Barriers to Learning/Limitations: yes;  emotional and physical  Compensate with: Verbal Cues and Tactile Cues  Prior Level of Function/Home Situation:   Home Situation  Home Environment: Apartment  # Steps to Enter: 0  Living Alone: No  Support Systems: Family member(s) (Brothers)  Patient Expects to be Discharged to[de-identified] Unknown  Current DME Used/Available at Home: Walker, rolling  Critical Behavior:  Neurologic State: Alert  Orientation Level: Appropriate for age;Oriented X4  Cognition: Appropriate decision making; Follows commands  Safety/Judgement: Awareness of environment; Fall prevention  Psychosocial  Purposeful Interaction: Yes  Pt Identified Daily Priority: Clinical issues (comment); Communication issues (comment)  Caritas Process: Establish trust;Teaching/learning; Attend basic human needs;Create healing environment  Caring Interventions: Reassure  Reassure: Informing; Acceptance;Caring rounds  Strength:    Strength: Generally decreased, functional  Tone & Sensation:   Tone: Normal  Sensation: Intact  Range Of Motion:  AROM: Generally decreased, functional  PROM: Generally decreased, functional  Functional Mobility:  Bed Mobility:  Supine to Sit: Contact guard assistance  Sit to Supine: Contact guard assistance;Minimum assistance  Scooting: Contact guard assistance;Minimum assistance  Transfers:  Sit to Stand:  (Not assessed)  Stand to Sit:  (Not assessed)  Balance:   Sitting: Intact; Without support  Pain:  Pain Scale 1: Numeric (0 - 10)  Pain Intensity 1: 0  Activity Tolerance:   Fair  Please refer to the flowsheet for vital signs taken during this treatment. After treatment:   []         Patient left in no apparent distress sitting up in chair  [x]         Patient left in no apparent distress in bed  [x]         Call bell left within reach  [x]         Nursing notified  []         Caregiver present  []         Bed alarm activated    COMMUNICATION/EDUCATION:   [x]         Fall prevention education was provided and the patient/caregiver indicated understanding. [x]         Patient/family have participated as able in goal setting and plan of care. [x]         Patient/family agree to work toward stated goals and plan of care. []         Patient understands intent and goals of therapy, but is neutral about his/her participation.   []         Patient is unable to participate in goal setting and plan of care.     Thank you for this referral.  Khadijah Tomlinson, PT   Time Calculation: 23 mins

## 2018-03-01 NOTE — PROGRESS NOTES
01:15 Shift assessment completed. See nsg flow sheet for details. 03:35 Reassessed with 0 changes noted. Resting quietly in bed with eyes closed between cares. 07:30 Bedside and Verbal shift change report given to Gerald Walsh RN (oncoming nurse) by Scott Rogers RN (offgoing nurse). Report included the following information SBAR.

## 2018-03-02 ENCOUNTER — APPOINTMENT (OUTPATIENT)
Dept: CT IMAGING | Age: 48
DRG: 194 | End: 2018-03-02
Attending: PHYSICIAN ASSISTANT
Payer: MEDICAID

## 2018-03-02 LAB
ALBUMIN SERPL-MCNC: 1.7 G/DL (ref 3.4–5)
ANION GAP SERPL CALC-SCNC: 13 MMOL/L (ref 3–18)
BUN SERPL-MCNC: 43 MG/DL (ref 7–18)
BUN/CREAT SERPL: 14 (ref 12–20)
CALCIUM SERPL-MCNC: 7.2 MG/DL (ref 8.5–10.1)
CALCIUM SERPL-MCNC: 7.5 MG/DL (ref 8.5–10.1)
CHLORIDE SERPL-SCNC: 116 MMOL/L (ref 100–108)
CO2 SERPL-SCNC: 16 MMOL/L (ref 21–32)
CREAT SERPL-MCNC: 3.13 MG/DL (ref 0.6–1.3)
ERYTHROCYTE [DISTWIDTH] IN BLOOD BY AUTOMATED COUNT: 14.2 % (ref 11.6–14.5)
FERRITIN SERPL-MCNC: 1043 NG/ML (ref 8–388)
GLUCOSE BLD STRIP.AUTO-MCNC: 78 MG/DL (ref 70–110)
GLUCOSE SERPL-MCNC: 85 MG/DL (ref 74–99)
HCT VFR BLD AUTO: 25.5 % (ref 36–48)
HGB BLD-MCNC: 8.3 G/DL (ref 13–16)
IRON SATN MFR SERPL: 35 %
IRON SERPL-MCNC: 46 UG/DL (ref 50–175)
MCH RBC QN AUTO: 26.7 PG (ref 24–34)
MCHC RBC AUTO-ENTMCNC: 32.5 G/DL (ref 31–37)
MCV RBC AUTO: 82 FL (ref 74–97)
PHOSPHATE SERPL-MCNC: 4.1 MG/DL (ref 2.5–4.9)
PLATELET # BLD AUTO: 235 K/UL (ref 135–420)
PMV BLD AUTO: 10.8 FL (ref 9.2–11.8)
POTASSIUM SERPL-SCNC: 3.6 MMOL/L (ref 3.5–5.5)
PTH-INTACT SERPL-MCNC: 190 PG/ML (ref 18.4–88)
RBC # BLD AUTO: 3.11 M/UL (ref 4.7–5.5)
SODIUM SERPL-SCNC: 145 MMOL/L (ref 136–145)
TIBC SERPL-MCNC: 131 UG/DL (ref 250–450)
WBC # BLD AUTO: 5.7 K/UL (ref 4.6–13.2)

## 2018-03-02 PROCEDURE — 97530 THERAPEUTIC ACTIVITIES: CPT

## 2018-03-02 PROCEDURE — 83540 ASSAY OF IRON: CPT | Performed by: INTERNAL MEDICINE

## 2018-03-02 PROCEDURE — 82728 ASSAY OF FERRITIN: CPT | Performed by: INTERNAL MEDICINE

## 2018-03-02 PROCEDURE — 97535 SELF CARE MNGMENT TRAINING: CPT

## 2018-03-02 PROCEDURE — 74011250637 HC RX REV CODE- 250/637: Performed by: INTERNAL MEDICINE

## 2018-03-02 PROCEDURE — 65660000000 HC RM CCU STEPDOWN

## 2018-03-02 PROCEDURE — 36415 COLL VENOUS BLD VENIPUNCTURE: CPT | Performed by: INTERNAL MEDICINE

## 2018-03-02 PROCEDURE — 82962 GLUCOSE BLOOD TEST: CPT

## 2018-03-02 PROCEDURE — 97166 OT EVAL MOD COMPLEX 45 MIN: CPT

## 2018-03-02 PROCEDURE — 80069 RENAL FUNCTION PANEL: CPT | Performed by: INTERNAL MEDICINE

## 2018-03-02 PROCEDURE — 74011250636 HC RX REV CODE- 250/636: Performed by: INTERNAL MEDICINE

## 2018-03-02 PROCEDURE — 85027 COMPLETE CBC AUTOMATED: CPT | Performed by: INTERNAL MEDICINE

## 2018-03-02 PROCEDURE — 81001 URINALYSIS AUTO W/SCOPE: CPT | Performed by: INTERNAL MEDICINE

## 2018-03-02 PROCEDURE — 70450 CT HEAD/BRAIN W/O DYE: CPT

## 2018-03-02 PROCEDURE — 77010033678 HC OXYGEN DAILY

## 2018-03-02 PROCEDURE — 83970 ASSAY OF PARATHORMONE: CPT | Performed by: INTERNAL MEDICINE

## 2018-03-02 RX ORDER — MAGNESIUM SULFATE HEPTAHYDRATE 40 MG/ML
2 INJECTION, SOLUTION INTRAVENOUS ONCE
Status: COMPLETED | OUTPATIENT
Start: 2018-03-02 | End: 2018-03-02

## 2018-03-02 RX ORDER — AMLODIPINE BESYLATE 5 MG/1
5 TABLET ORAL DAILY
Status: DISCONTINUED | OUTPATIENT
Start: 2018-03-02 | End: 2018-03-02

## 2018-03-02 RX ORDER — ISOSORBIDE MONONITRATE 30 MG/1
30 TABLET, EXTENDED RELEASE ORAL DAILY
Status: DISCONTINUED | OUTPATIENT
Start: 2018-03-03 | End: 2018-03-03

## 2018-03-02 RX ORDER — AMLODIPINE BESYLATE 5 MG/1
10 TABLET ORAL DAILY
Status: DISCONTINUED | OUTPATIENT
Start: 2018-03-03 | End: 2018-03-07 | Stop reason: HOSPADM

## 2018-03-02 RX ADMIN — AMLODIPINE BESYLATE 5 MG: 5 TABLET ORAL at 13:52

## 2018-03-02 RX ADMIN — HEPARIN SODIUM 5000 UNITS: 5000 INJECTION, SOLUTION INTRAVENOUS; SUBCUTANEOUS at 15:27

## 2018-03-02 RX ADMIN — FUROSEMIDE 80 MG: 10 INJECTION, SOLUTION INTRAMUSCULAR; INTRAVENOUS at 08:44

## 2018-03-02 RX ADMIN — CARVEDILOL 25 MG: 12.5 TABLET, FILM COATED ORAL at 08:44

## 2018-03-02 RX ADMIN — CARVEDILOL 25 MG: 12.5 TABLET, FILM COATED ORAL at 16:27

## 2018-03-02 RX ADMIN — FUROSEMIDE 80 MG: 10 INJECTION, SOLUTION INTRAMUSCULAR; INTRAVENOUS at 21:47

## 2018-03-02 RX ADMIN — MAGNESIUM SULFATE HEPTAHYDRATE 2 G: 40 INJECTION, SOLUTION INTRAVENOUS at 08:44

## 2018-03-02 RX ADMIN — HEPARIN SODIUM 5000 UNITS: 5000 INJECTION, SOLUTION INTRAVENOUS; SUBCUTANEOUS at 06:18

## 2018-03-02 NOTE — PROGRESS NOTES
OT osmar attempted. Pt currently on bedpan and reported has been having diarrhea today. Will re-attempt later as schedule permits.  Thank you Samy Amin OTR/L

## 2018-03-02 NOTE — PROGRESS NOTES
Problem: Mobility Impaired (Adult and Pediatric)  Goal: *Acute Goals and Plan of Care (Insert Text)  Physical Therapy Goals LT/ST  Initiated 3/1/2018 and to be accomplished within 5-7 day(s)  1. Patient will move from supine <> sit with S in prep for out of bed activity and change of position. 2.  Patient will perform sit<> stand with CGA/S with LRAD in prep for transfers/ambulation. 3.  Patient will transfer from bed <> chair with CGA/S with LRAD for time up in chair for completion of ADL activity. 4.  Patient will ambulate >50 feet with LRAD/Min-CGA for improved functional mobility/safe discharge. .      Outcome: Not Progressing Towards Goal  physical Therapy TREATMENT    Patient: Alia Hannon (40 y.o. male)  Date: 3/2/2018  Diagnosis: Acute exacerbation of CHF (congestive heart failure) (HCC)  Acute on chronic renal insufficiency  Hyponatremia  Anemia of chronic disease Acute exacerbation of CHF (congestive heart failure) ([facility] [address]       Precautions: Fall   Chart, physical therapy assessment, plan of care and goals were reviewed. ASSESSMENT:  Pt is very focused on his abdominal and LE swelling. He points out elliot of recent skin breakdown on residual limb. Pt's sitting balance is good, but he is unwilling to attempt  scooting or standing with RW  SLS. Pt educated on needs for home return, with no additional assistance available to him. Pt is reluctant to again with rehab recommendation, noting that he just needs to get the fluid out. Progression toward goals:  []      Improving appropriately and progressing toward goals  [x]      Improving slowly and progressing toward goals  []      Not making progress toward goals and plan of care will be adjusted     PLAN:  Patient continues to benefit from skilled intervention to address the above impairments. Continue treatment per established plan of care.   Discharge Recommendations:  Saul Mejia or To Be Determined  Further Equipment Recommendations for Discharge:  bedside commode and rolling walker     SUBJECTIVE:   Patient stated Do you see how big my legs are.     OBJECTIVE DATA SUMMARY:   Critical Behavior:  Neurologic State: Alert  Orientation Level: Oriented X4  Cognition: Appropriate decision making, Appropriate for age attention/concentration, Appropriate safety awareness, Follows commands  Safety/Judgement: Awareness of environment, Fall prevention  Functional Mobility Training:  Bed Mobility:  Supine to Sit: Stand-by assistance  Scooting: Stand-by assistance  Balance:  Sitting: Intact  Pain:  Pain in: 0/10  Pain out: 0/10   Activity Tolerance:   Fair-  Please refer to the flowsheet for vital signs taken during this treatment.   After treatment:   [x] Patient left in no apparent distress sitting EOB  [] Patient left in no apparent distress in bed  [x] Call bell left within reach  [x] Nursing notified  [] Caregiver present  [] Bed alarm activated      Jesse Quintero PTA   Time Calculation: 27 mins

## 2018-03-02 NOTE — PROGRESS NOTES
D/c plan: anticipate d/c home tomorrow or Sunday  Met with patient at bedside during IDR's. Provided with list of foc for Rosalinda Rainey and has chosen to use [facility] [phone] when medically cleared for d/c. Patient states he has gone to rehab before and he does not want to go back. States he lives with his brother who can help him. Informed patient what if Pt has recommendations for Rehab he stated \" I am going home\" ja did inform cm he has a wheelchair and rolling walker at home no other needs. Care Management Interventions  PCP Verified by CM: Yes  Mode of Transport at Discharge:  Other (see comment) (friend/family)  Transition of Care Consult (CM Consult): [address]: No  Reason Outside [place]: Patient declined ordered home care/hospice services ([facility] has [facility] in the past and wants them again per foc)  Health Maintenance Reviewed: Yes  Physical Therapy Consult: Yes  Occupational Therapy Consult: Yes  Current Support Network: Lives Alone  Confirm Follow Up Transport: Family  Plan discussed with Pt/Family/Caregiver: Yes  Freedom of Choice Offered: Yes  Discharge Location  Discharge Placement: Home with home health

## 2018-03-02 NOTE — PROGRESS NOTES
Hospitalist Progress Note    Patient: Sanjeev Gomez. MRN: [id number]  CSN: [id number]    YOB: 1970  Age: 41 y.o. Sex: male    DOA: 2/28/2018 LOS:  LOS: 2 days            Assessment/Plan     1. Acute decompensated systolic & diastolic CHF. He is diuresing and clinically improving. EF has dropped from 70% to 40-50% since prior echo done at [facility]  2. CKD 3-4  3. HTN  4. H/o BKA  5. Heavy proteinuriea and significant hypoalbuminemia, nondiagnostic work up recently  6. Anemia of CKD    Plan:  - continue diuresis w IV lasix  - add patrick for supplementation, quantify proteinuria  - will ask for cardiology input  - iron stores pending at this time  - stop accuchecks  - mobilize w PT  - full code, dvt ppx heparin      Patient Active Problem List   Diagnosis Code    Hyponatremia E87.1    Anemia of chronic disease D63.8    Acute on chronic renal insufficiency N28.9, N18.9    Acute exacerbation of CHF (congestive heart failure) ([facility]) I50.9               Subjective:    cc: \" I feel a little better\"  No acute events overnight  Diuresing w IV lasix  Denies dyspnea      REVIEW OF SYSTEMS:  General: No fevers or chills. Cardiovascular: No chest pain or pressure. No palpitations. Pulmonary: No shortness of breath. Gastrointestinal: No nausea, vomiting. Objective:        Vital signs/Intake and Output:  Visit Vitals    BP (!) 169/92 (BP 1 Location: Right arm, BP Patient Position: At rest;Supine; Head of bed elevated (Comment degrees))    Pulse 82    Temp 97.8 °F (36.6 °C)    Resp 16    Ht 5' 7\" (1.702 m)    Wt 110.2 kg (242 lb 14.4 oz)    SpO2 100%    BMI 38.04 kg/m2     Current Shift:  03/02 0701 - 03/02 1900  In: -   Out: 525 [Urine:525]  Last three shifts:  02/28 1901 - 03/02 0700  In: 1510 [P.O.:1510]  Out: 3350 [Urine:3350]    Body mass index is 38.04 kg/(m^2).     Physical Exam:  GEN: Alert and oriented times three NAD, eating breakfast  EYES: conjunctiva normal, lids with out lesions  HEENT: MMM, No thyromegaly, no lymphadenopathy  HEART: RRR +S1 +S2, no JVD, pulses 2+ distally  LUNGS: CTA B/L no wheezes, rales or rhonchi  ABDOMEN: + BS, soft NT/ND no organomegaly,  No rebound  EXTREMITIES: No edema cyanosis, cap refill normal, + amputation   SKIN: no rashes or skin breakdown, no nodules, normal turgor  Current Facility-Administered Medications   Medication Dose Route Frequency    nystatin (MYCOSTATIN) 100,000 unit/gram powder   Topical BID    furosemide (LASIX) injection 80 mg  80 mg IntraVENous Q12H    acetaminophen (TYLENOL) tablet 650 mg  650 mg Oral Q4H PRN    heparin (porcine) injection 5,000 Units  5,000 Units SubCUTAneous Q8H    carvedilol (COREG) tablet 25 mg  25 mg Oral BID WITH MEALS    acetaminophen (TYLENOL) tablet 650 mg  650 mg Oral Q6H PRN         All the patient's labs over the past 24 hours were reviewed both during my initial daily workflow process and at the time notated as \"note time\" in [facility]. (It is not time stamped separately in this workflow.)  Select labs are listed below.         Labs: Results:       Chemistry Recent Labs      03/02/18 0408 03/01/18 0609 02/28/18   1050   GLU  85  83  89   NA  145  147*  147*   K  3.6  3.7  3.2*   CL  116*  120*  120*   CO2  16*  17*  17*   BUN  43*  42*  42*   CREA  3.13*  3.07*  3.03*   CA  7.5*  7.2*  7.5*   AGAP  13  10  10   BUCR  14  14  14   AP   --    --   142*   TP   --    --   6.7   ALB  1.7*   --   1.8*   GLOB   --    --   4.9*   AGRAT   --    --   0.4*      CBC w/Diff Recent Labs      03/02/18 0408 03/01/18   0609 02/28/18   1050   WBC  5.7  5.4  6.6   RBC  3.11*  2.92*  3.10*   HGB  8.3*  7.8*  8.3*   HCT  25.5*  24.0*  25.3*   PLT  235  207  237   GRANS   --   70  72   LYMPH   --   18*  16*   EOS   --   3  3      Cardiac Enzymes Recent Labs      02/28/18   1050   CPK  570*   CKND1  0.7      Coagulation Recent Labs      02/28/18   1050   PTP  13.3   INR  1.1   APTT  32.9 Liver Enzymes Recent Labs      03/02/18   0408  02/28/18   1050   TP   --   6.7   ALB  1.7*  1.8*   AP   --   142*   SGOT   --   22          Procedures/imaging: see electronic medical records for all procedures/Xrays and details which were not copied into this note but were reviewed prior to creation of [address], DO  Internal Medicine/Geriatrics

## 2018-03-02 NOTE — CONSULTS
[id number] [facility]    Name:SHIVAM Pyle  MR#: [id number]  : 1970  ACCOUNT #: [de-identified]   DATE OF SERVICE: 2018    ASKING PHYSICIAN:  Dr. Jm Leiva:  CHF. HISTORY OF PRESENT ILLNESS:  The patient is a 72-year-old gentleman with a complex past medical history significant for sickle cell disease, diabetes mellitus, hypertension, renal disease, status post right below the knee amputation who presented to the hospital with worsening of shortness of breath and worsening of renal failure, had orthopnea, PND and mild swelling. According to the patient, he was recently admitted into [facility] and then he was told his heart is enlarged. His previous echocardiogram in 2017 had shown preserved LV function with diastolic dysfunction. At this point, patient looks like in hypertensive heart disease. Denied any chest pain, no previous ischemia workup and is responding to some extent to the diuretic treatment. Denied any dizziness, presyncope and syncope. PAST MEDICAL HISTORY:  Significant for:    1. Hypertension. 2.  Congestive heart failure. 3.  Diastolic heart failure. 4.  Anemia of chronic disease. 5.  Acute on chronic renal insufficiency. 6.  Right below the knee amputation. SOCIAL HISTORY:  Denied any history of smoking or alcohol or drug abuse. ALLERGIES:  THE PATIENT IS NOT ALLERGIC TO ANY MEDICATION. Blood pressure is suboptimally controlled. FAMILY HISTORY:  Negative at this point. HOME MEDICATIONS:  Include Lasix mentioned only in our computer, but currently he is on amlodipine, carvedilol, Lasix. REVIEW OF SYSTEMS:  Positive for shortness of breath, ankle swelling, mild orthopnea and rest of the 10-point system is negative. PHYSICAL EXAMINATION:  GENERAL:  A 72-year-old gentleman with a complex medical history comfortable at this point with right below the knee amputation.   VITAL SIGNS:  Temperature is 97.6, heart rate is 85, respirations 16, blood pressure is 169/91, pulse oximetry 100%. HEAD:  Atraumatic, normocephalic. NECK:  Supple, no JVD, no carotid bruit. HEART:  S1, S2 audible. LUNGS:  Bilateral air entry positive. No added sound. ABDOMEN:  Soft, nontender. EXTREMITIES:  No edema. Pulses are palpable. Right below the knee amputation. Mild chronic edema and chronic edema changes in the left leg. LABORATORY DATA:  EKG, sinus rhythm with nonspecific ST and T changes. Cardiac telemetry revealed sinus rhythm without any significant tachy- or bradyarrhythmia. Hemoglobin is 8.3, platelet count is 577. Sodium 145, potassium 3.6, BUN 43, creatinine is 3.13. Troponin is 0.02. BNP is 25,183. Chest x-ray with cardiomegaly with mild interstitial edema and small left pleural effusion. ASSESSMENT AND PLAN:  1. Acute on chronic diastolic heart failure, probably hypertensive heart disease. 2.  Cardiomyopathy without any ischemia workup. 3.  Hypertension. 4.  Renal disease. 5.  Anemia of chronic disease. RECOMMENDATION:  Aggressive treatment of blood pressure. I will increase the dose of amlodipine from 5 mg to 10 mg once a day and I will add isosorbide mononitrate. Patient is not ACEI/ARB because renal dysfunction and patient will need ischemia workup probably with the Lexiscan nuclear stress test as well. I will follow the patient.       Shante Nye MD MA /   D: 03/02/2018 16:05     T: 03/02/2018 16:34  JOB #: [id number]

## 2018-03-02 NOTE — PROGRESS NOTES
1130: Assumed care report received from NAHEED Friedman  1230: Elevated BP noted MD notified. See new order.

## 2018-03-02 NOTE — PROGRESS NOTES
1920 Pt received from offgoing nurse without any signs or symptoms of distress. Pt vitals are stable and within normal limits. Pt bed in low position with wheels locked and call bell within reach. 1957 Assessment completed and documented in flow sheet. Pt denies any further needs at this time. Pt in NAD with bed in low position, wheels locked and call bell within reach. 2226 Scheduled medications administered as ordered. 2335 Reassessment completed with no changes noted. Bed locked, in lowest position, with call light within reach. 7422 Reassessment completed with no changes noted. Bed locked, in lowest position, with call light within reach. 3002 Scheduled medications administered as ordered. 0630 Shift summary: Patient spent uneventful shift. No issues noted. See flow sheet and MAR.  1124 Bedside and Verbal shift change report given to [address] [place] [address] (oncoming nurse) by William Albarado RN   (offgoing nurse). Report included the following information SBAR, Intake/Output, MAR and Recent Results.

## 2018-03-02 NOTE — ROUTINE PROCESS
Bedside and Verbal shift change report given to Idalia (oncoming nurse) by Emily Pozo (offgoing nurse). Report included the following information SBAR, Kardex, Intake/Output, MAR and Cardiac Rhythm NSR.

## 2018-03-02 NOTE — PROGRESS NOTES
Nephrology Progress Note        History of Present Illness:  Marco Carlin is a 41 y.o. male with a past medical history significant for DM, HTN, Sickle Cell trait, diastolic CHF, s/p R BKA, CKD who presented with c/o worsening SOB ~ 2 weeks. Initial evaluation consistent with CHF decompensation. Labs revealed a S Cr of 3.0, S. Alb 1.8 and Anemia Hgb 8.3. Patient admitted and given IV diuretic Furosemide. Review of records from recent hospitalization at [facility] in Nov 2017 reveals a S Cr in range of 2.5 to 2.8. Serologies including Hep B and C,as well as HIV were neg., Complements C3 and C4 were normal. Serum and Urine Protein Electrophoresis did not reveal monoclonal spike. A 24 hr Urine Protein was 2.2 gms. PTH was normal.  ECHO showed EF 60%. Back in Aug 2017,  S Cr was in the range of 1.8 to 2.2. SALEEM was pos at a titer of 1: 40 speckled pattern. Anti DS-DNA and Anti-Smith Ab were neg. SOB improving. Still has LE edema, no CP. Impression:     - Acute Kidney Injury on CKD 3/4, likely sec to CHF decompensation with decreased Renal perfusion vs progression of CKD. No hydro on US. Cr stable currently 3.0-3.1.  - CKD3/4 with proteinuria likely sec to Diabetic Nephropathy/Hypertensive Kidney disease. Recent baseline Cr 2.5-2.8. Previous work up at [facility] and [facility] in 2017 noted. Serologies, SPEP, UPEP, largely unremarkable except for+ SALEEM at a low titer.    - Systolic/Diastolic CHF decompensation, EF 45-50%  - Anemia , likely sec to CKD  - Metabolic Acidosis  - Hypernatremia, mild, improving   - Hypoalbuminemia  - DM  - HTN, not well controlled   - S/p R BKA    Plan:   - Continue current dose of Intermittent IV Lasix, if response not satisfactory cane be switched to Lasix drip +/- Metolazone for synergy  - Monitor I/0's  - Monitor chemistry  - TSAT, Ferritin, PTH pending  - If adequate iron, patient will likely need EVIE for Anemia.  - Would avoid potential nephrotoxic exposure including IV contrast dye or NSAIDs  - No urgent indication for HD presently  but seems to be heading in that direction with progressive deterioration of renal function over the past few months and fluid retention/CHF. Discussed with patient. Inpatient meds:  Current Facility-Administered Medications   Medication Dose Route Frequency    nystatin (MYCOSTATIN) 100,000 unit/gram powder   Topical BID    furosemide (LASIX) injection 80 mg  80 mg IntraVENous Q12H    acetaminophen (TYLENOL) tablet 650 mg  650 mg Oral Q4H PRN    heparin (porcine) injection 5,000 Units  5,000 Units SubCUTAneous Q8H    carvedilol (COREG) tablet 25 mg  25 mg Oral BID WITH MEALS    acetaminophen (TYLENOL) tablet 650 mg  650 mg Oral Q6H PRN         Vital signs:   Visit Vitals    BP (!) 169/92 (BP 1 Location: Right arm, BP Patient Position: At rest;Supine; Head of bed elevated (Comment degrees))    Pulse 82    Temp 97.8 °F (36.6 °C)    Resp 16    Ht 5' 7\" (1.702 m)    Wt 110.2 kg (242 lb 14.4 oz)    SpO2 100%    BMI 38.04 kg/m2         Intake/Output Summary (Last 24 hours) at 03/02/18 1033  Last data filed at 03/02/18 0725   Gross per 24 hour   Intake              600 ml   Output             2775 ml   Net            -2175 ml       Physical Exam:    General: No acute distress   HENT: Atraumatic and normocephalic   Eyes: Normal conjunctiva   Neck: Supple . No JVD   Cardiovascular: Normal S1 & S2, no m/r/g   Pulmonary/Chest Wall: Clear to auscultation bilaterally   Abdominal: Soft and non-tender   Musculoskeletal: 2+  Edema  LLE with chronic skin changes. S/p R BKA   Neurological: AA and O X 3.  No focal deficits       Data Review:  Recent Labs      03/02/18   0408  03/01/18   0609   NA  145  147*   K  3.6  3.7   CL  116*  120*   CO2  16*  17*   BUN  43*  42*   CREA  3.13*  3.07*   GLU  85  83   PHOS  4.1   --    CA  7.5*  7.2*     Recent Labs      03/02/18   0408  03/01/18   0609   WBC  5.7  5.4   HGB  8.3*  7.8*   HCT  25.5*  24.0*   PLT  235  207 Recent Labs      03/02/18   0408  02/28/18   1050   SGOT   --   22   AP   --   142*   TP   --   6.7   ALB  1.7*  1.8*   GLOB   --   4.9*     Recent Labs      02/28/18   1050   INR  1.1   PTP  13.3   APTT  32.9      No results for input(s): IRON, FE, TIBC, IBCT, PSAT, FERR in the last 72 hours.          CXR:     IMPRESSION:     Cardiomegaly with possible mild interstitial edema and small left effusion.          Hansa Sheppard MD  [address] [facility]

## 2018-03-02 NOTE — PROGRESS NOTES
Problem: Falls - Risk of  Goal: *Absence of Falls  Document Vanessa Fall Risk and appropriate interventions in the flowsheet.    Outcome: Progressing Towards Goal  Fall Risk Interventions:  Mobility Interventions: Communicate number of staff needed for ambulation/transfer, Patient to call before getting OOB, PT Consult for mobility concerns, PT Consult for assist device competence, Strengthening exercises (ROM-active/passive), Utilize walker, cane, or other assitive device         Medication Interventions: Patient to call before getting OOB    Elimination Interventions: Call light in reach, Toilet paper/wipes in reach, Urinal in reach, Toileting schedule/hourly rounds

## 2018-03-02 NOTE — PROGRESS NOTES
Problem: Self Care Deficits Care Plan (Adult)  Goal: *Acute Goals and Plan of Care (Insert Text)  Occupational Therapy Goals  Initiated 3/2/2018 within 7 day(s). 1.  Patient will perform upper body dressing with supervision/set-up   2. Patient will perform lower body dressing with minimal assistance/contact guard assist.  3.  Patient will perform toilet transfers with supervision/set-up. 4.  Patient will perform all aspects of toileting with supervision/set-up. 5.  Patient will participate in upper extremity therapeutic exercise/activities with supervision/set-up for 5 minutes. 6.  Patient will complete standing during functional activities with min assist for 5 minutes to increase activity tolerance. Occupational Therapy EVALUATION    Patient: Krishna Urias (40 y.o. male)  Date: 3/2/2018  Primary Diagnosis: Acute exacerbation of CHF (congestive heart failure) (HCC)  Acute on chronic renal insufficiency  Hyponatremia  Anemia of chronic disease        Precautions:  Fall    ASSESSMENT :  Based on the objective data described below, the patient presents with acute CHF and renal insufficiency. Pt able to complete UB ADL with min assist and LB ADL with total assist. Total assist for toileting. Pt lives alone during most of the day secondary to brother working. Pt was I with ADLs but reported it took extra effort. Pt unable to complete transfer or sit to stand this date secondary to extra fluid on residual limb making prosthesis not fit appropriately. Pt could benefit from OT to increase I with ADLs, transfers, mobility, activity tolerance and strength for functional activity. Patient will benefit from skilled intervention to address the above impairments.   Patients rehabilitation potential is considered to be Fair  Factors which may influence rehabilitation potential include:   []             None noted  []             Mental ability/status  []             Medical condition  [] Home/family situation and support systems  []             Safety awareness  []             Pain tolerance/management  []             Other:      PLAN :  Recommendations and Planned Interventions:  [x]               Self Care Training                  [x]        Therapeutic Activities  [x]               Functional Mobility Training    []        Cognitive Retraining  [x]               Therapeutic Exercises           [x]        Endurance Activities  [x]               Balance Training                   []        Neuromuscular Re-Education  []               Visual/Perceptual Training     [x]   Home Safety Training  [x]               Patient Education                 [x]        Family Training/Education  []               Other (comment):    Frequency/Duration: Patient will be followed by occupational therapy 1-2 times per day/4-7 days per week to address goals. Discharge Recommendations: Rehab  Further Equipment Recommendations for Discharge: N/A     SUBJECTIVE:   Patient stated I have all this fluid.     OBJECTIVE DATA SUMMARY:     Past Medical History:   Diagnosis Date    Chronic kidney disease     Diabetes [facility]     Heart failure ([facility]     Hypertension      Past Surgical History:   Procedure Laterality Date    HX ORTHOPAEDIC      right BKA 2017     Barriers to Learning/Limitations: yes;  physical  Compensate with: visual, verbal, tactile, kinesthetic cues/model  Prior Level of Function/Home Situation: I with ADLs prior to admission  Home Situation  Home Environment: Apartment  # Steps to Enter: 0  Living Alone: No  Support Systems: Family member(s) (Brothers)  Patient Expects to be Discharged to[de-identified] Unknown  Current DME Used/Available at Home: Commode, bedside  Tub or Shower Type: Tub/Shower combination  []  Right hand dominant   []  Left hand dominant  Cognitive/Behavioral Status:  Neurologic State: Alert  Safety/Judgement: Awareness of environment; Fall prevention  Skin: intact  Edema: edema noted on Bilateral LE  Vision/Perceptual:    Corrective Lenses: Glasses  Coordination:  Coordination: Within functional limits  Fine Motor Skills-Upper: Left Intact; Right Intact    Gross Motor Skills-Upper: Left Intact; Right Intact  Balance:  Sitting: Intact  Strength:  Strength: Within functional limits  Tone & Sensation:  Tone: Normal  Sensation: Intact  Range of Motion:  AROM: Within functional limits  Functional Mobility and Transfers for ADLs:  Bed Mobility:  Supine to Sit: Stand-by assistance  Scooting: Stand-by assistance  Transfers: N/A  ADL Assessment:  Upper Body Dressing: Contact guard assistance  Lower Body Dressing: Total assistance  Toileting: Total assistance  ADL Intervention:  Cognitive Retraining  Safety/Judgement: Awareness of environment; Fall prevention    Pain:  None reported  Activity Tolerance:   Fair   Please refer to the flowsheet for vital signs taken during this treatment. After treatment:   [] Patient left in no apparent distress sitting up in chair  [x] Patient left in no apparent distress in bed  [x] Call bell left within reach  [] Nursing notified  [] Caregiver present  [] Bed alarm activated    COMMUNICATION/EDUCATION:   [x] Home safety education was provided and the patient/caregiver indicated understanding. [x] Patient/family have participated as able in goal setting and plan of care. [x] Patient/family agree to work toward stated goals and plan of care. [] Patient understands intent and goals of therapy, but is neutral about his/her participation. [] Patient is unable to participate in goal setting and plan of care.     Thank you for this referral.  Jennifer Latif OTR/L  Time Calculation: 24 mins

## 2018-03-02 NOTE — PROGRESS NOTES
0715 Report received from STEFAN Cantrell RN.    955-1100 Pt off floor for echo. 1450 Pt off floor for ultra sound. 1500 Report given to COLTON Walker RN.

## 2018-03-03 LAB
ANION GAP SERPL CALC-SCNC: 11 MMOL/L (ref 3–18)
APPEARANCE UR: CLEAR
BACTERIA URNS QL MICRO: ABNORMAL /HPF
BILIRUB UR QL: NEGATIVE
BUN SERPL-MCNC: 42 MG/DL (ref 7–18)
BUN/CREAT SERPL: 13 (ref 12–20)
CALCIUM SERPL-MCNC: 7.8 MG/DL (ref 8.5–10.1)
CHLORIDE SERPL-SCNC: 116 MMOL/L (ref 100–108)
CO2 SERPL-SCNC: 18 MMOL/L (ref 21–32)
COLOR UR: YELLOW
CREAT SERPL-MCNC: 3.32 MG/DL (ref 0.6–1.3)
EPITH CASTS URNS QL MICRO: ABNORMAL /LPF (ref 0–5)
ERYTHROCYTE [DISTWIDTH] IN BLOOD BY AUTOMATED COUNT: 14.3 % (ref 11.6–14.5)
GLUCOSE SERPL-MCNC: 94 MG/DL (ref 74–99)
GLUCOSE UR STRIP.AUTO-MCNC: NEGATIVE MG/DL
HCT VFR BLD AUTO: 28.7 % (ref 36–48)
HGB BLD-MCNC: 9.4 G/DL (ref 13–16)
HGB UR QL STRIP: ABNORMAL
KETONES UR QL STRIP.AUTO: NEGATIVE MG/DL
LEUKOCYTE ESTERASE UR QL STRIP.AUTO: NEGATIVE
MCH RBC QN AUTO: 27 PG (ref 24–34)
MCHC RBC AUTO-ENTMCNC: 32.8 G/DL (ref 31–37)
MCV RBC AUTO: 82.5 FL (ref 74–97)
MUCOUS THREADS URNS QL MICRO: NEGATIVE /LPF
NITRITE UR QL STRIP.AUTO: NEGATIVE
PH UR STRIP: 5 [PH] (ref 5–8)
PLATELET # BLD AUTO: 234 K/UL (ref 135–420)
PMV BLD AUTO: 9.7 FL (ref 9.2–11.8)
POTASSIUM SERPL-SCNC: 3.6 MMOL/L (ref 3.5–5.5)
PROT UR STRIP-MCNC: 300 MG/DL
RBC # BLD AUTO: 3.48 M/UL (ref 4.7–5.5)
RBC #/AREA URNS HPF: NEGATIVE /HPF (ref 0–5)
SODIUM SERPL-SCNC: 145 MMOL/L (ref 136–145)
SP GR UR REFRACTOMETRY: 1.01 (ref 1–1.03)
UROBILINOGEN UR QL STRIP.AUTO: 0.2 EU/DL (ref 0.2–1)
WBC # BLD AUTO: 6.2 K/UL (ref 4.6–13.2)
WBC URNS QL MICRO: ABNORMAL /HPF (ref 0–5)

## 2018-03-03 PROCEDURE — 74011250636 HC RX REV CODE- 250/636: Performed by: INTERNAL MEDICINE

## 2018-03-03 PROCEDURE — 85027 COMPLETE CBC AUTOMATED: CPT | Performed by: PHYSICIAN ASSISTANT

## 2018-03-03 PROCEDURE — 74011250637 HC RX REV CODE- 250/637: Performed by: INTERNAL MEDICINE

## 2018-03-03 PROCEDURE — 65660000000 HC RM CCU STEPDOWN

## 2018-03-03 PROCEDURE — 80048 BASIC METABOLIC PNL TOTAL CA: CPT | Performed by: PHYSICIAN ASSISTANT

## 2018-03-03 PROCEDURE — 77010033678 HC OXYGEN DAILY

## 2018-03-03 PROCEDURE — 36415 COLL VENOUS BLD VENIPUNCTURE: CPT | Performed by: PHYSICIAN ASSISTANT

## 2018-03-03 PROCEDURE — 74011250637 HC RX REV CODE- 250/637: Performed by: FAMILY MEDICINE

## 2018-03-03 RX ORDER — HYDRALAZINE HYDROCHLORIDE 10 MG/1
10 TABLET, FILM COATED ORAL 3 TIMES DAILY
Status: DISCONTINUED | OUTPATIENT
Start: 2018-03-03 | End: 2018-03-04

## 2018-03-03 RX ORDER — SAME BUTANEDISULFONATE/BETAINE 400-600 MG
500 POWDER IN PACKET (EA) ORAL 2 TIMES DAILY
Status: DISCONTINUED | OUTPATIENT
Start: 2018-03-03 | End: 2018-03-07 | Stop reason: HOSPADM

## 2018-03-03 RX ORDER — LOPERAMIDE HYDROCHLORIDE 2 MG/1
2 CAPSULE ORAL
Status: DISCONTINUED | OUTPATIENT
Start: 2018-03-03 | End: 2018-03-07 | Stop reason: HOSPADM

## 2018-03-03 RX ORDER — ISOSORBIDE MONONITRATE 60 MG/1
60 TABLET, EXTENDED RELEASE ORAL DAILY
Status: DISCONTINUED | OUTPATIENT
Start: 2018-03-04 | End: 2018-03-07 | Stop reason: HOSPADM

## 2018-03-03 RX ORDER — SODIUM BICARBONATE 650 MG/1
650 TABLET ORAL 3 TIMES DAILY
Status: DISCONTINUED | OUTPATIENT
Start: 2018-03-03 | End: 2018-03-04

## 2018-03-03 RX ADMIN — NYSTATIN: 100000 POWDER TOPICAL at 21:49

## 2018-03-03 RX ADMIN — NYSTATIN: 100000 POWDER TOPICAL at 00:19

## 2018-03-03 RX ADMIN — CARVEDILOL 25 MG: 12.5 TABLET, FILM COATED ORAL at 10:51

## 2018-03-03 RX ADMIN — SODIUM BICARBONATE 650 MG TABLET 650 MG: at 21:48

## 2018-03-03 RX ADMIN — ISOSORBIDE MONONITRATE 30 MG: 30 TABLET, EXTENDED RELEASE ORAL at 10:51

## 2018-03-03 RX ADMIN — LOPERAMIDE HYDROCHLORIDE 2 MG: 2 CAPSULE ORAL at 17:28

## 2018-03-03 RX ADMIN — FUROSEMIDE 80 MG: 10 INJECTION, SOLUTION INTRAMUSCULAR; INTRAVENOUS at 22:15

## 2018-03-03 RX ADMIN — HEPARIN SODIUM 5000 UNITS: 5000 INJECTION, SOLUTION INTRAVENOUS; SUBCUTANEOUS at 17:28

## 2018-03-03 RX ADMIN — HEPARIN SODIUM 5000 UNITS: 5000 INJECTION, SOLUTION INTRAVENOUS; SUBCUTANEOUS at 22:15

## 2018-03-03 RX ADMIN — LOPERAMIDE HYDROCHLORIDE 2 MG: 2 CAPSULE ORAL at 10:51

## 2018-03-03 RX ADMIN — Medication 500 MG: at 10:51

## 2018-03-03 RX ADMIN — AMLODIPINE BESYLATE 10 MG: 5 TABLET ORAL at 10:50

## 2018-03-03 RX ADMIN — FUROSEMIDE 80 MG: 10 INJECTION, SOLUTION INTRAMUSCULAR; INTRAVENOUS at 10:53

## 2018-03-03 RX ADMIN — Medication 500 MG: at 21:48

## 2018-03-03 RX ADMIN — HEPARIN SODIUM 5000 UNITS: 5000 INJECTION, SOLUTION INTRAVENOUS; SUBCUTANEOUS at 00:19

## 2018-03-03 RX ADMIN — CARVEDILOL 25 MG: 12.5 TABLET, FILM COATED ORAL at 17:28

## 2018-03-03 RX ADMIN — HEPARIN SODIUM 5000 UNITS: 5000 INJECTION, SOLUTION INTRAVENOUS; SUBCUTANEOUS at 10:51

## 2018-03-03 RX ADMIN — HYDRALAZINE HYDROCHLORIDE 10 MG: 10 TABLET, FILM COATED ORAL at 21:48

## 2018-03-03 RX ADMIN — NYSTATIN: 100000 POWDER TOPICAL at 09:00

## 2018-03-03 NOTE — PROGRESS NOTES
Problem: Mobility Impaired (Adult and Pediatric)  Goal: *Acute Goals and Plan of Care (Insert Text)  Physical Therapy Goals LT/ST  Initiated 3/1/2018 and to be accomplished within 5-7 day(s)  1. Patient will move from supine <> sit with S in prep for out of bed activity and change of position. 2.  Patient will perform sit<> stand with CGA/S with LRAD in prep for transfers/ambulation. 3.  Patient will transfer from bed <> chair with CGA/S with LRAD for time up in chair for completion of ADL activity. 4.  Patient will ambulate >50 feet with LRAD/Min-CGA for improved functional mobility/safe discharge. .      Outcome: Not Progressing Towards Goal  Pt refused PT due to:  [x]  \"I need to be cleaned up. I just had a bowel movement on myself. \"   []  Eating  []  Pain  []  Pt lethargic  []  Off Unit  Will f/u later, as pt's schedule allows. Thank you.   Luz Luque, PTA

## 2018-03-03 NOTE — PROGRESS NOTES
5571 Urine sample collected and sent to the lab for the ordered tests. [id number] Shift Summary: Pt rested well overnight with no complaints. No new clinical concerns noted.

## 2018-03-03 NOTE — PROGRESS NOTES
0730 Bedside report received from COLTON Julian RN. Pt in bed on laptop, no signs of distress noted. 1600 Changed pt and still had loose stool. Shift uneventful. Pt is stable with no signs of distress.

## 2018-03-03 NOTE — PROGRESS NOTES
Cardiology Progress Note        Patient: Quintin Mcfarlane Sex: male          DOA: 2/28/2018  YOB: 1970      Age:  41 y.o.        LOS:  LOS: 3 days   Assessment/Plan     Principal Problem:    Acute exacerbation of CHF (congestive heart failure) ([address] (2/28/2018)    Active Problems:    Hyponatremia (2/28/2018)      Anemia of chronic disease (2/28/2018)      Acute on chronic renal insufficiency (2/28/2018)        Plan:  NSR  BP is still suboptimals  Increase isosorbide to 60 mg and hydralazine 10 mg TID  Stress test on Monday for ischemia work up of cardiomyopathy. Discussed with patient. Subjective:    cc:  No CP      REVIEW OF SYSTEMS: NO CP, SOB, N,V,D, F,C  Objective:      Visit Vitals    BP (!) 156/103 (BP 1 Location: Right arm, BP Patient Position: At rest)    Pulse 86    Temp 97.4 °F (36.3 °C)    Resp 18    Ht 5' 7\" (1.702 m)    Wt 109.5 kg (241 lb 4.8 oz)    SpO2 99%    BMI 37.79 kg/m2     Body mass index is 37.79 kg/(m^2). Physical Exam:  General Appearance: Comfortable, not using accessory muscles of respiration. HEENT: VIVIAN. HEAD: Atraumatic  NECK: No JVD, no thyroidomeglay. LUNGS: Clear bilaterally. HEART: S1+S2 audible    ABD: Non-tender, BS Audible    EXT: 1+ edema, Right BKA  VASCULAR EXAM: Pulses are intact. PSYCHIATRIC EXAM: Mood is appropriate. NEUROLOGICAL: No motor and sensory deficit, Cranial nerves II-XII intact.   Medication:  Current Facility-Administered Medications   Medication Dose Route Frequency    loperamide (IMODIUM) capsule 2 mg  2 mg Oral TID PRN    Saccharomyces boulardii (FLORASTOR) capsule 500 mg  500 mg Oral BID    [START ON 3/4/2018] isosorbide mononitrate ER (IMDUR) tablet 60 mg  60 mg Oral DAILY    amLODIPine (NORVASC) tablet 10 mg  10 mg Oral DAILY    nystatin (MYCOSTATIN) 100,000 unit/gram powder   Topical BID    furosemide (LASIX) injection 80 mg  80 mg IntraVENous Q12H    acetaminophen (TYLENOL) tablet 650 mg  650 mg Oral Q4H PRN    heparin (porcine) injection 5,000 Units  5,000 Units SubCUTAneous Q8H    carvedilol (COREG) tablet 25 mg  25 mg Oral BID WITH MEALS    acetaminophen (TYLENOL) tablet 650 mg  650 mg Oral Q6H PRN               Lab/Data Reviewed:  Procedures/imaging: see electronic medical records for all procedures/Xrays   and details which were not copied into this note but were reviewed prior to creation of Plan       All lab results for the last 24 hours reviewed.      Recent Labs      03/03/18   0648  03/02/18   0408  03/01/18   0609   WBC  6.2  5.7  5.4   HGB  9.4*  8.3*  7.8*   HCT  28.7*  25.5*  24.0*   PLT  234  235  207     Recent Labs      03/03/18   0648  03/02/18   0408  03/01/18   0609   NA  145  145  147*   K  3.6  3.6  3.7   CL  116*  116*  120*   CO2  18*  16*  17*   GLU  94  85  83   BUN  42*  43*  42*   CREA  3.32*  3.13*  3.07*   CA  7.8*  7.2*  7.5*  7.2*       Signed By: Karen Keating MD     March 3, 2018

## 2018-03-03 NOTE — PROGRESS NOTES
Problem: Mobility Impaired (Adult and Pediatric)  Goal: *Acute Goals and Plan of Care (Insert Text)  Physical Therapy Goals LT/ST  Initiated 3/1/2018 and to be accomplished within 5-7 day(s)  1. Patient will move from supine <> sit with S in prep for out of bed activity and change of position. 2.  Patient will perform sit<> stand with CGA/S with LRAD in prep for transfers/ambulation. 3.  Patient will transfer from bed <> chair with CGA/S with LRAD for time up in chair for completion of ADL activity. 4.  Patient will ambulate >50 feet with LRAD/Min-CGA for improved functional mobility/safe discharge. .      Outcome: Not Progressing Towards Goal  Pt refused PT due to:  []  Nausea/vomiting  [x]  Eating lunch  []  Pain  []  Pt lethargic  []  Off Unit  Will f/u later, as pt's schedule allows. Thank you.   Sigrid Bloch, PTA

## 2018-03-03 NOTE — PROGRESS NOTES
Problem: Mobility Impaired (Adult and Pediatric)  Goal: *Acute Goals and Plan of Care (Insert Text)  Physical Therapy Goals LT/ST  Initiated 3/1/2018 and to be accomplished within 5-7 day(s)  1. Patient will move from supine <> sit with S in prep for out of bed activity and change of position. 2.  Patient will perform sit<> stand with CGA/S with LRAD in prep for transfers/ambulation. 3.  Patient will transfer from bed <> chair with CGA/S with LRAD for time up in chair for completion of ADL activity. 4.  Patient will ambulate >50 feet with LRAD/Min-CGA for improved functional mobility/safe discharge. .      Outcome: Not Progressing Towards Goal  Pt refused PT due to:  [x]  Pt on bedpan   []  Eating  []  Pain  []  Pt lethargic  []  Off Unit  Will f/u later, as pt's schedule allows. Thank you.   Thomas Mccullough, PTA

## 2018-03-03 NOTE — ROUTINE PROCESS
Bedside and Verbal shift change report given to Velna Homans RN  (oncoming nurse) by Gay Combs RN  (offgoing nurse). Report given with SBAR, Kardex, Intake/Output and Recent Results.

## 2018-03-03 NOTE — PROGRESS NOTES
SHIFT SUMMARY:   Pt has been stable day shift other than elevated BP which is managed by scheduled meds. No distress noted or verbalized. BM soft x1. o other concerns at this time. Bedside shift change report given to NAHEED Springer. Report included the following information SBAR, Kardex, Intake/Output, MAR and Recent Results.

## 2018-03-03 NOTE — PROGRESS NOTES
Problem: Mobility Impaired (Adult and Pediatric)  Goal: *Acute Goals and Plan of Care (Insert Text)  Physical Therapy Goals LT/ST  Initiated 3/1/2018 and to be accomplished within 5-7 day(s)  1. Patient will move from supine <> sit with S in prep for out of bed activity and change of position. 2.  Patient will perform sit<> stand with CGA/S with LRAD in prep for transfers/ambulation. 3.  Patient will transfer from bed <> chair with CGA/S with LRAD for time up in chair for completion of ADL activity. 4.  Patient will ambulate >50 feet with LRAD/Min-CGA for improved functional mobility/safe discharge. .      Outcome: Not Progressing Towards Goal  Pt refused PT due to:  [x]  Pt on bed pan again   []  Eating  []  Pain  []  Pt lethargic  []  Off Unit  Will attempt, once more to f/u once later as pt's schedule allows. Thank you.   Emani Blankenship, PTA

## 2018-03-03 NOTE — PROGRESS NOTES
Hospitalist Progress Note    Patient: Karthik Thompson. MRN: [id number]  CSN: [id number]    YOB: 1970  Age: 41 y.o. Sex: male    DOA: 2/28/2018 LOS:  LOS: 3 days            Assessment/Plan     Principal Problem:    Acute exacerbation of CHF (congestive heart failure) ([address] (2/28/2018)    Active Problems:    Hyponatremia (2/28/2018)      Anemia of chronic disease (2/28/2018)      Acute on chronic renal insufficiency (2/28/2018)    Acute decompensated systolic & diastolic CHF. Continue lasix. He is diuresing and clinically improving. EF has dropped from 70% to 40-50% since prior echo done at [facility]. Cardiology on case  Stress test on Monday     Diarrhea: No hx of Abx use. Add probiotics    CKD 3-4: Monitor Bcer    HTN: incontrolled. increase hydralazine    H/o BKA    Heavy proteinuriea and significant hypoalbuminemia, nondiagnostic work up recently: On [facility]    Anemia of CKD     Mobilize w PT    Full code, dvt ppx heparin    CC:  CHF, CKD, HTN, anemia, diarrhea        Subjective:     Pt was seen and examined with the nurse in the morning round. Reports that he has been having loose BM 4-5 times/day for past 2 days. No fevers/chills. No CP    Review of systems  General: No fevers or chills. Cardiovascular: No chest pain or pressure. No palpitations. Pulmonary: No cough, SOB  Gastrointestinal: No nausea, vomiting. + diarrhea    Objective:      Visit Vitals    BP (!) 179/95 (BP 1 Location: Right arm, BP Patient Position: At rest)    Pulse 80    Temp 98 °F (36.7 °C)    Resp 18    Ht 5' 7\" (1.702 m)    Wt 109.5 kg (241 lb 4.8 oz)    SpO2 100%    BMI 37.79 kg/m2       Physical Exam:    Gen: NAD, chronic ill appearing  Heent:  MMM, NC, AT. Cor: s1s2 RRR. No MRG. PMI mid 5th intercostal space. Resp:  CTA b/l. No w/r/r. Nml effort and diaphragmatic excursion. Abd:  NT ND.  BS positive. No rebound or guarding. No masses. Ext: S/P R. BKA.  1+ edema      Intake and Output:  Current Shift:     Last three shifts:  03/01 1901 - 03/03 0700  In: 1800 [P.O.:1800]  Out: 3230 [Urine:3230]    Labs: Results:       Chemistry Recent Labs      03/03/18   0648  03/02/18   0408  03/01/18   0609  02/28/18   1050   GLU  94  85  83  89   NA  145  145  147*  147*   K  3.6  3.6  3.7  3.2*   CL  116*  116*  120*  120*   CO2  18*  16*  17*  17*   BUN  42*  43*  42*  42*   CREA  3.32*  3.13*  3.07*  3.03*   CA  7.8*  7.2*  7.5*  7.2*  7.5*   AGAP  11  13  10  10   BUCR  13  14  14  14   AP   --    --    --   142*   TP   --    --    --   6.7   ALB   --   1.7*   --   1.8*   GLOB   --    --    --   4.9*   AGRAT   --    --    --   0.4*      CBC w/Diff Recent Labs      03/03/18   0648  03/02/18   0408  03/01/18   0609  02/28/18   1050   WBC  6.2  5.7  5.4  6.6   RBC  3.48*  3.11*  2.92*  3.10*   HGB  9.4*  8.3*  7.8*  8.3*   HCT  28.7*  25.5*  24.0*  25.3*   PLT  234  235  207  237   GRANS   --    --   70  72   LYMPH   --    --   18*  16*   EOS   --    --   3  3      Cardiac Enzymes Recent Labs      02/28/18   1050   CPK  570*   CKND1  0.7      Coagulation Recent Labs      02/28/18   1050   PTP  13.3   INR  1.1   APTT  32.9       Lipid Panel No results found for: CHOL, CHOLPOCT, CHOLX, CHLST, CHOLV, 781244, HDL, LDL, LDLC, DLDLP, 420145, VLDLC, VLDL, TGLX, TRIGL, TRIGP, TGLPOCT, CHHD, CHHDX   BNP No results for input(s): BNPP in the last 72 hours.    Liver Enzymes Recent Labs      03/02/18   0408  02/28/18   1050   TP   --   6.7   ALB  1.7*  1.8*   AP   --   142*   SGOT   --   22      Thyroid Studies No results found for: T4, T3U, TSH, TSHEXT     Procedures/imaging: see electronic medical records for all procedures/Xrays and details which were not copied into this note but were reviewed prior to creation of Plan      Medications Reviewed  Mara Jennings MD

## 2018-03-04 PROBLEM — I42.5 OTHER RESTRICTIVE CARDIOMYOPATHY (HCC): Status: ACTIVE | Noted: 2018-03-04

## 2018-03-04 PROBLEM — D50.9 IRON DEFICIENCY ANEMIA: Status: ACTIVE | Noted: 2018-03-04

## 2018-03-04 PROBLEM — I42.9 CARDIOMYOPATHY (HCC): Status: ACTIVE | Noted: 2018-03-04

## 2018-03-04 LAB
ANION GAP SERPL CALC-SCNC: 11 MMOL/L (ref 3–18)
ARTERIAL PATENCY WRIST A: YES
ATRIAL RATE: 95 BPM
BASE DEFICIT BLD-SCNC: 11 MMOL/L
BDY SITE: ABNORMAL
BUN SERPL-MCNC: 46 MG/DL (ref 7–18)
BUN/CREAT SERPL: 13 (ref 12–20)
CALCIUM SERPL-MCNC: 7.3 MG/DL (ref 8.5–10.1)
CALCULATED P AXIS, ECG09: 79 DEGREES
CALCULATED R AXIS, ECG10: 30 DEGREES
CALCULATED T AXIS, ECG11: -176 DEGREES
CHLORIDE SERPL-SCNC: 118 MMOL/L (ref 100–108)
CO2 SERPL-SCNC: 18 MMOL/L (ref 21–32)
CREAT SERPL-MCNC: 3.47 MG/DL (ref 0.6–1.3)
DIAGNOSIS, 93000: NORMAL
ERYTHROCYTE [DISTWIDTH] IN BLOOD BY AUTOMATED COUNT: 14.3 % (ref 11.6–14.5)
GAS FLOW.O2 O2 DELIVERY SYS: ABNORMAL L/MIN
GLUCOSE SERPL-MCNC: 93 MG/DL (ref 74–99)
HCO3 BLD-SCNC: 15.8 MMOL/L (ref 22–26)
HCT VFR BLD AUTO: 23.8 % (ref 36–48)
HEMOCCULT STL QL: NEGATIVE
HGB BLD-MCNC: 7.9 G/DL (ref 13–16)
IRON SATN MFR SERPL: 47 %
IRON SERPL-MCNC: 56 UG/DL (ref 50–175)
MAGNESIUM SERPL-MCNC: 1.7 MG/DL (ref 1.6–2.6)
MCH RBC QN AUTO: 27.1 PG (ref 24–34)
MCHC RBC AUTO-ENTMCNC: 33.2 G/DL (ref 31–37)
MCV RBC AUTO: 81.5 FL (ref 74–97)
O2/TOTAL GAS SETTING VFR VENT: 21 %
P-R INTERVAL, ECG05: 150 MS
PCO2 BLD: 33.8 MMHG (ref 35–45)
PH BLD: 7.28 [PH] (ref 7.35–7.45)
PLATELET # BLD AUTO: 234 K/UL (ref 135–420)
PMV BLD AUTO: 10.1 FL (ref 9.2–11.8)
PO2 BLD: 61 MMHG (ref 80–100)
POTASSIUM SERPL-SCNC: 3.7 MMOL/L (ref 3.5–5.5)
Q-T INTERVAL, ECG07: 380 MS
QRS DURATION, ECG06: 74 MS
QTC CALCULATION (BEZET), ECG08: 477 MS
RBC # BLD AUTO: 2.92 M/UL (ref 4.7–5.5)
RETICS/RBC NFR AUTO: 1.8 % (ref 0.5–2.3)
SAO2 % BLD: 88 % (ref 92–97)
SERVICE CMNT-IMP: ABNORMAL
SODIUM SERPL-SCNC: 147 MMOL/L (ref 136–145)
SPECIMEN TYPE: ABNORMAL
TIBC SERPL-MCNC: 119 UG/DL (ref 250–450)
TOTAL RESP. RATE, ITRR: 20
VENTRICULAR RATE, ECG03: 95 BPM
WBC # BLD AUTO: 5.8 K/UL (ref 4.6–13.2)

## 2018-03-04 PROCEDURE — 74011250637 HC RX REV CODE- 250/637: Performed by: INTERNAL MEDICINE

## 2018-03-04 PROCEDURE — 65660000000 HC RM CCU STEPDOWN

## 2018-03-04 PROCEDURE — 36415 COLL VENOUS BLD VENIPUNCTURE: CPT | Performed by: PHYSICIAN ASSISTANT

## 2018-03-04 PROCEDURE — C9113 INJ PANTOPRAZOLE SODIUM, VIA: HCPCS | Performed by: FAMILY MEDICINE

## 2018-03-04 PROCEDURE — 83735 ASSAY OF MAGNESIUM: CPT | Performed by: PHYSICIAN ASSISTANT

## 2018-03-04 PROCEDURE — 82272 OCCULT BLD FECES 1-3 TESTS: CPT | Performed by: FAMILY MEDICINE

## 2018-03-04 PROCEDURE — 85027 COMPLETE CBC AUTOMATED: CPT | Performed by: PHYSICIAN ASSISTANT

## 2018-03-04 PROCEDURE — 77010033678 HC OXYGEN DAILY

## 2018-03-04 PROCEDURE — 80048 BASIC METABOLIC PNL TOTAL CA: CPT | Performed by: PHYSICIAN ASSISTANT

## 2018-03-04 PROCEDURE — 83540 ASSAY OF IRON: CPT | Performed by: FAMILY MEDICINE

## 2018-03-04 PROCEDURE — 74011250636 HC RX REV CODE- 250/636: Performed by: INTERNAL MEDICINE

## 2018-03-04 PROCEDURE — 36600 WITHDRAWAL OF ARTERIAL BLOOD: CPT

## 2018-03-04 PROCEDURE — 74011250637 HC RX REV CODE- 250/637: Performed by: FAMILY MEDICINE

## 2018-03-04 PROCEDURE — 77030032490 HC SLV COMPR SCD KNE COVD -B

## 2018-03-04 PROCEDURE — 85045 AUTOMATED RETICULOCYTE COUNT: CPT | Performed by: FAMILY MEDICINE

## 2018-03-04 PROCEDURE — 74011250636 HC RX REV CODE- 250/636: Performed by: FAMILY MEDICINE

## 2018-03-04 PROCEDURE — 82803 BLOOD GASES ANY COMBINATION: CPT

## 2018-03-04 PROCEDURE — 97530 THERAPEUTIC ACTIVITIES: CPT

## 2018-03-04 RX ORDER — SODIUM BICARBONATE 650 MG/1
1300 TABLET ORAL 2 TIMES DAILY
Status: DISCONTINUED | OUTPATIENT
Start: 2018-03-04 | End: 2018-03-07 | Stop reason: HOSPADM

## 2018-03-04 RX ORDER — HYDRALAZINE HYDROCHLORIDE 25 MG/1
25 TABLET, FILM COATED ORAL 3 TIMES DAILY
Status: DISCONTINUED | OUTPATIENT
Start: 2018-03-04 | End: 2018-03-07 | Stop reason: HOSPADM

## 2018-03-04 RX ADMIN — NYSTATIN: 100000 POWDER TOPICAL at 21:19

## 2018-03-04 RX ADMIN — PANTOPRAZOLE SODIUM 40 MG: 40 INJECTION, POWDER, FOR SOLUTION INTRAVENOUS at 11:02

## 2018-03-04 RX ADMIN — HYDRALAZINE HYDROCHLORIDE 10 MG: 10 TABLET, FILM COATED ORAL at 11:05

## 2018-03-04 RX ADMIN — LOPERAMIDE HYDROCHLORIDE 2 MG: 2 CAPSULE ORAL at 11:26

## 2018-03-04 RX ADMIN — CARVEDILOL 25 MG: 12.5 TABLET, FILM COATED ORAL at 17:55

## 2018-03-04 RX ADMIN — SODIUM BICARBONATE 650 MG TABLET 1300 MG: at 21:18

## 2018-03-04 RX ADMIN — LOPERAMIDE HYDROCHLORIDE 2 MG: 2 CAPSULE ORAL at 21:18

## 2018-03-04 RX ADMIN — AMLODIPINE BESYLATE 10 MG: 5 TABLET ORAL at 11:06

## 2018-03-04 RX ADMIN — HEPARIN SODIUM 5000 UNITS: 5000 INJECTION, SOLUTION INTRAVENOUS; SUBCUTANEOUS at 07:29

## 2018-03-04 RX ADMIN — FUROSEMIDE 80 MG: 10 INJECTION, SOLUTION INTRAMUSCULAR; INTRAVENOUS at 21:18

## 2018-03-04 RX ADMIN — HYDRALAZINE HYDROCHLORIDE 25 MG: 25 TABLET, FILM COATED ORAL at 21:18

## 2018-03-04 RX ADMIN — FUROSEMIDE 80 MG: 10 INJECTION, SOLUTION INTRAMUSCULAR; INTRAVENOUS at 11:06

## 2018-03-04 RX ADMIN — HYDRALAZINE HYDROCHLORIDE 25 MG: 25 TABLET, FILM COATED ORAL at 17:55

## 2018-03-04 RX ADMIN — SODIUM BICARBONATE 650 MG TABLET 650 MG: at 11:05

## 2018-03-04 RX ADMIN — Medication 500 MG: at 21:18

## 2018-03-04 RX ADMIN — ISOSORBIDE MONONITRATE 60 MG: 60 TABLET, EXTENDED RELEASE ORAL at 11:05

## 2018-03-04 RX ADMIN — CARVEDILOL 25 MG: 12.5 TABLET, FILM COATED ORAL at 11:06

## 2018-03-04 RX ADMIN — Medication 500 MG: at 11:06

## 2018-03-04 NOTE — PROGRESS NOTES
In patient nephrology progress note    Admit Date:    2018  Name:  Cynthia Herrera. Age :  41 y.o. Cynthia Gil is a 41 y.o. male with a past medical history significant for DM, HTN, Sickle Cell trait, diastolic CHF, s/p R BKA, CKD who presented with c/o worsening SOB ~ 2 weeks. Initial evaluation consistent with CHF decompensation. Labs revealed a S Cr of 3.0, S. Alb 1.8 and Anemia Hgb 8.3. Patient admitted and given IV diuretic Furosemide. Review of records from recent hospitalization at [facility] in 2017 reveals a S Cr in range of 2.5 to 2.8. Serologies including Hep B and C,as well as HIV were neg., Complements C3 and C4 were normal. Serum and Urine Protein Electrophoresis did not reveal monoclonal spike. A 24 hr Urine Protein was 2.2 gms. PTH was normal.  ECHO showed EF 60%. Back in Aug 2017,  S Cr was in the range of 1.8 to 2.2. SALEEM was pos at a titer of 1: 40 speckled pattern. Anti DS-DNA and Anti-Smith Ab were neg.     Impression:   1 Acute Kidney Injury on CKD 3/4, likely sec to CHF decompensation with decreased Renal perfusion vs progression of CKD. No hydro on US. Cr stable currently 5.5-5.5.  2. Systolic/Diastolic CHF decompensation, EF 45-50%, on IV diuretic   3. Metabolic Acidosis  4. HTN, BP elevation   5. DM   6. S/p R BKA    Recommendations:   1. Keep IV diuresis   2. Add sodium Bicarbonate  3. Check ABG   4. Low salt diet and fluid restriction 32 oz a day   5.  We will continue to follow with you     Subjective:   No acute event over night   Patient denied CP/SOB/N/Vor       Objective:   Temp (24hrs), Av.7 °F (36.5 °C), Min:97.4 °F (36.3 °C), Max:98 °F (36.7 °C)      Intake/Output Summary (Last 24 hours) at 18  Last data filed at 18 1420   Gross per 24 hour   Intake              720 ml   Output             1775 ml   Net            -1055 ml     Last 3 Recorded Weights in this Encounter    18 0411 18 0327 18 2244   Weight: 110.8 kg (244 lb 4.3 oz) 110.2 kg (242 lb 14.4 oz) 109.5 kg (241 lb 4.8 oz)       Physical Exam:     General Appearance: no pain, no distress  Head: atraumatic  HEENT: no jaundice, moist oral mucosa  Neck: no JVD noted  Chest: no rales or accessory muscle use   Heart: S1/S2, no murmur, gallop or rub, RRR  Adbomen: obese, nontender, BS active   : no flank tenderness, no brarett catheter  Skin: intact, hyperpigmentation of lower extremity   Extremity: 3+ edema of left , BKA of right   MS: No joint deformity or tenderness  Neuro: conscious, alert, oriented x 3, no focal deficit    Data Review:    BMP  Lab Results   Component Value Date/Time    Sodium 145 03/03/2018 06:48 AM    Potassium 3.6 03/03/2018 06:48 AM    Chloride 116 (H) 03/03/2018 06:48 AM    CO2 18 (L) 03/03/2018 06:48 AM    Anion gap 11 03/03/2018 06:48 AM    Glucose 94 03/03/2018 06:48 AM    BUN 42 (H) 03/03/2018 06:48 AM    Creatinine 3.32 (H) 03/03/2018 06:48 AM    BUN/Creatinine ratio 13 03/03/2018 06:48 AM    GFR est AA 25 (L) 03/03/2018 06:48 AM    GFR est non-AA 20 (L) 03/03/2018 06:48 AM    Calcium 7.8 (L) 03/03/2018 06:48 AM       CBC  Lab Results   Component Value Date/Time    WBC 6.2 03/03/2018 06:48 AM    HGB 9.4 (L) 03/03/2018 06:48 AM    HCT 28.7 (L) 03/03/2018 06:48 AM    PLATELET 262 87/78/2682 06:48 AM    MCV 82.5 03/03/2018 06:48 AM       No components found for: PTHINT  Lab Results   Component Value Date    IRON 46 (L) 03/02/2018           Leida Valenzuela MD  [facility] [phone]  Pager [phone]

## 2018-03-04 NOTE — PROGRESS NOTES
Cardiology Progress Note        Patient: Meredith Valdez. Sex: male          DOA: 2/28/2018  YOB: 1970      Age:  41 y.o.        LOS:  LOS: 4 days   Assessment/Plan     Principal Problem:    Acute exacerbation of CHF (congestive heart failure) ([facility] (2/28/2018)    Active Problems:    Hyponatremia (2/28/2018)      Anemia of chronic disease (2/28/2018)      Acute on chronic renal insufficiency (2/28/2018)      Iron deficiency anemia (3/4/2018)      Other restrictive cardiomyopathy ([facility] (3/4/2018)        Plan:  NSR  BP improving and suboptimal  Increase hydralazine from 10 mg PO TID to 25 mg po TID   Will stress test If Hb> 8.0                  Subjective:    cc:  diarrhea in AM      REVIEW OF SYSTEMS: NO CP, SOB, N,V,D, F,C  Objective:      Visit Vitals    /83 (BP 1 Location: Right arm, BP Patient Position: At rest)    Pulse 87    Temp 98.2 °F (36.8 °C)    Resp 18    Ht 5' 7\" (1.702 m)    Wt 109.9 kg (242 lb 4.6 oz)    SpO2 100%    BMI 37.95 kg/m2     Body mass index is 37.95 kg/(m^2). Physical Exam:  General Appearance: Comfortable, not using accessory muscles of respiration. HEENT: VIVIAN. HEAD: Atraumatic  NECK: No JVD, no thyroidomeglay. LUNGS: Clear bilaterally. HEART: S1+S2 audible    ABD: Non-tender, BS Audible    EXT: R BKA, mild edema on left ankle  VASCULAR EXAM: Pulses are intact. PSYCHIATRIC EXAM: Mood is appropriate. MUSCULOSKELETAL: Grossly no joint deformity. NEUROLOGICAL: No motor and sensory deficit, Cranial nerves II-XII intact.   Medication:  Current Facility-Administered Medications   Medication Dose Route Frequency    pantoprazole (PROTONIX) 40 mg in sodium chloride 10 mL injection  40 mg IntraVENous DAILY    hydrALAZINE (APRESOLINE) tablet 25 mg  25 mg Oral TID    loperamide (IMODIUM) capsule 2 mg  2 mg Oral TID PRN    Saccharomyces boulardii (FLORASTOR) capsule 500 mg  500 mg Oral BID    isosorbide mononitrate ER (IMDUR) tablet 60 mg  60 mg Oral DAILY    sodium bicarbonate tablet 650 mg  650 mg Oral TID    amLODIPine (NORVASC) tablet 10 mg  10 mg Oral DAILY    nystatin (MYCOSTATIN) 100,000 unit/gram powder   Topical BID    furosemide (LASIX) injection 80 mg  80 mg IntraVENous Q12H    acetaminophen (TYLENOL) tablet 650 mg  650 mg Oral Q4H PRN    carvedilol (COREG) tablet 25 mg  25 mg Oral BID WITH MEALS    acetaminophen (TYLENOL) tablet 650 mg  650 mg Oral Q6H PRN               Lab/Data Reviewed:  Procedures/imaging: see electronic medical records for all procedures/Xrays   and details which were not copied into this note but were reviewed prior to creation of Plan       All lab results for the last 24 hours reviewed.      Recent Labs      03/04/18   0545  03/03/18   0648  03/02/18   0408   WBC  5.8  6.2  5.7   HGB  7.9*  9.4*  8.3*   HCT  23.8*  28.7*  25.5*   PLT  234  234  235     Recent Labs      03/04/18   0545  03/03/18   0648  03/02/18   0408   NA  147*  145  145   K  3.7  3.6  3.6   CL  118*  116*  116*   CO2  18*  18*  16*   GLU  93  94  85   BUN  46*  42*  43*   CREA  3.47*  3.32*  3.13*   CA  7.3*  7.8*  7.2*  7.5*       Signed By: Alejandra Barker MD     March 4, 2018

## 2018-03-04 NOTE — PROGRESS NOTES
In patient nephrology progress note    Admit Date:    2018  Name:  Gayleen Jeans. Age :  41 y.o. Charlene Nielson Jr. is a 41 y. o. male with a past medical history significant for DM, HTN, Sickle Cell trait, diastolic CHF, s/p R BKA, CKD who presented with c/o worsening SOB ~ 2 weeks. Initial evaluation consistent with CHF decompensation. Labs revealed a S Cr of 3.0, S. Alb 1.8 and Anemia Hgb 8. 3.  Patient admitted and given IV diuretic Furosemide. Review of records from recent hospitalization at [facility] in 2017 reveals a S Cr in range of 2.5 to 2.8. Serologies including Hep B and C,as well as HIV were neg., Complements C3 and C4 were normal. Serum and Urine Protein Electrophoresis did not reveal monoclonal spike. A 24 hr Urine Protein was 2.2 gms. PTH was normal.  ECHO showed EF 60%. Back in Aug 2017,  S Cr was in the range of 1.8 to 2.2. SALEEM was pos at a titer of 1: 40 speckled pattern. Anti DS-DNA and Anti-Smith Ab were neg.     Impression:   1 CKD progression v.s acute Kidney Injury on CKD 3/4 : non oliguric,  No improvement of renal function in this admission . No obstructive uropathy per renal / US. 2. Systolic/Diastolic CHF decompensation, EF 45-50%, on IV diuretic   3. Metabolic Acidosis, no worsening   4. Anemia , hemoglobin lower today ,adequarte  Iron storage    5. HTN, not at goal but improved    6. DM   7. S/p R BKA     Recommendations:   1. Keep IV diuresis   2. Increase sodium Bicarbonate  3. Add EPO    4. Would give blood transfusion to bring hemoglobin above 9-10.gm   5.  We will continue to follow with you       Subjective:   No acute event over night   Diarrhea resolved  , denied SOB    Objective:   Temp (24hrs), Av °F (36.7 °C), Min:97.6 °F (36.4 °C), Max:98.4 °F (36.9 °C)      Intake/Output Summary (Last 24 hours) at 18 1612  Last data filed at 18 0907   Gross per 24 hour   Intake             1111 ml   Output             1100 ml   Net               11 ml Last 3 Recorded Weights in this Encounter    03/02/18 0327 03/02/18 2244 03/04/18 0447   Weight: 110.2 kg (242 lb 14.4 oz) 109.5 kg (241 lb 4.8 oz) 109.9 kg (242 lb 4.6 oz)       Physical Exam:     General Appearance: no pain, no distress  Head: atraumatic  HEENT: no jaundice, moist oral mucosa  Neck: no JVD noted  Chest: LS clear to auscultation bilaterally  Heart: S1/S2, no murmur, gallop or rub, RRR  Adbomen: soft, nontender, BS active   : no flank tenderness, no barrett catheter  Skin: intact, no rash  Extremity: 2+ edema of left, right BKA  MS: Right BKA  Neuro: conscious, alert, oriented x 3, no focal deficit    Data Review:    BMP  Lab Results   Component Value Date/Time    Sodium 147 (H) 03/04/2018 05:45 AM    Potassium 3.7 03/04/2018 05:45 AM    Chloride 118 (H) 03/04/2018 05:45 AM    CO2 18 (L) 03/04/2018 05:45 AM    Anion gap 11 03/04/2018 05:45 AM    Glucose 93 03/04/2018 05:45 AM    BUN 46 (H) 03/04/2018 05:45 AM    Creatinine 3.47 (H) 03/04/2018 05:45 AM    BUN/Creatinine ratio 13 03/04/2018 05:45 AM    GFR est AA 23 (L) 03/04/2018 05:45 AM    GFR est non-AA 19 (L) 03/04/2018 05:45 AM    Calcium 7.3 (L) 03/04/2018 05:45 AM       CBC  Lab Results   Component Value Date/Time    WBC 5.8 03/04/2018 05:45 AM    HGB 7.9 (L) 03/04/2018 05:45 AM    HCT 23.8 (L) 03/04/2018 05:45 AM    PLATELET 426 84/02/9595 05:45 AM    MCV 81.5 03/04/2018 05:45 AM       No components found for: PTHINT  Lab Results   Component Value Date    IRON 56 03/04/2018         Grecia Centeno MD  [facility] [phone]  Pager [phone]

## 2018-03-04 NOTE — PROGRESS NOTES
Hospitalist Progress Note    Patient: Roberto Zheng. MRN: [id number]  CSN: [id number]    YOB: 1970  Age: 41 y.o. Sex: male    DOA: 2/28/2018 LOS:  LOS: 4 days            Assessment/Plan     Principal Problem:    Acute exacerbation of CHF (congestive heart failure) ([facility] [address] (2/28/2018)    Active Problems:    Hyponatremia (2/28/2018)      Anemia of chronic disease (2/28/2018)      Acute on chronic renal insufficiency (2/28/2018)      Iron deficiency anemia (3/4/2018)      Other restrictive cardiomyopathy ([facility] (3/4/2018)      Restrictive cardiomyopathy. Continue lasix. He is diuresing and clinically improving. EF has dropped from 70% to 40-50% since prior echo done at [facility]. On [facility]  Cardiology on case. Discussed the case with Dr. Christal Lora test on Monday if Hb>8.0    Acute on chronic anemia: 2nd to CKD vs GI bleeding. Work up ordered, will f/u.     Diarrhea: No hx of Abx use. Add probiotics, will collect stool for hemoccult     CKD 3-4: Monitor Bcer. Nephrology on case     HTN: incontrolled. increase hydralazine     H/o BKA   Heavy proteinuriea and significant hypoalbuminemia, nondiagnostic work up recently: On [address] w PT     Full code, dvt ppx heparin     CC:  CHF, CKD, HTN, anemia, diarrhea        Subjective:      Pt was seen and examined with the nurse in the morning round.      Reports that he has been having loose BM 4-5 times/day for past 2 days. No fevers/chills. No CP. Less SOB     Review of systems  General: No fevers or chills. Cardiovascular: No chest pain or pressure. No palpitations.    Pulmonary: No cough, SOB  Gastrointestinal: No nausea, vomiting. + diarrhea      Objective:      Visit Vitals    /75 (BP 1 Location: Left arm, BP Patient Position: At rest)    Pulse 83    Temp 98.4 °F (36.9 °C)    Resp 18    Ht 5' 7\" (1.702 m)    Wt 109.9 kg (242 lb 4.6 oz)    SpO2 100%    BMI 37.95 kg/m2       Physical Exam:    Gen: NAD,Chroic ill appearing  Heent:  MMM, NC, AT. Cor: s1s2 RRR. No MRG. PMI mid 5th intercostal space. Resp:  Rales to LB. Abd:  NT ND.  BS positive. No rebound or guarding. No masses. Ext: S/P R. BKA      Intake and Output:  Current Shift:  03/04 0701 - 03/04 1900  In: 383 [P.O.:591]  Out: -   Last three shifts:  03/02 1901 - 03/04 0700  In: 1240 [P.O.:1240]  Out: 9016 [Urine:2875]    Labs: Results:       Chemistry Recent Labs      03/04/18   0545  03/03/18   0648  03/02/18   0408   GLU  93  94  85   NA  147*  145  145   K  3.7  3.6  3.6   CL  118*  116*  116*   CO2  18*  18*  16*   BUN  46*  42*  43*   CREA  3.47*  3.32*  3.13*   CA  7.3*  7.8*  7.2*  7.5*   AGAP  11  11  13   BUCR  13  13  14   ALB   --    --   1.7*      CBC w/Diff Recent Labs      03/04/18   0545  03/03/18   0648  03/02/18   0408   WBC  5.8  6.2  5.7   RBC  2.92*  3.48*  3.11*   HGB  7.9*  9.4*  8.3*   HCT  23.8*  28.7*  25.5*   PLT  234  234  235      Cardiac Enzymes No results for input(s): CPK, CKND1, WILLIE in the last 72 hours. No lab exists for component: CKRMB, TROIP   Coagulation No results for input(s): PTP, INR, APTT in the last 72 hours. No lab exists for component: INREXT, INREXT    Lipid Panel No results found for: CHOL, CHOLPOCT, CHOLX, CHLST, CHOLV, 490594, HDL, LDL, LDLC, DLDLP, 291651, VLDLC, VLDL, TGLX, TRIGL, TRIGP, TGLPOCT, CHHD, CHHDX   BNP No results for input(s): BNPP in the last 72 hours.    Liver Enzymes Recent Labs      03/02/18   0408   ALB  1.7*      Thyroid Studies No results found for: T4, T3U, TSH, TSHEXT, TSHEXT     Procedures/imaging: see electronic medical records for all procedures/Xrays and details which were not copied into this note but were reviewed prior to creation of Plan      Medications Reviewed  Cody Meyers MD

## 2018-03-04 NOTE — ROUTINE PROCESS
Bedside and Verbal shift change report given to Norm Pinto RN (oncoming nurse) by Lady Rankin RN   (offgoing nurse). Report included the following information SBAR, Kardex, Intake/Output, MAR, Recent Results and Med Rec Status.

## 2018-03-04 NOTE — PROGRESS NOTES
Problem: Mobility Impaired (Adult and Pediatric)  Goal: *Acute Goals and Plan of Care (Insert Text)  Physical Therapy Goals LT/ST  Initiated 3/1/2018 and to be accomplished within 5-7 day(s)  1. Patient will move from supine <> sit with S in prep for out of bed activity and change of position. 2.  Patient will perform sit<> stand with CGA/S with LRAD in prep for transfers/ambulation. 3.  Patient will transfer from bed <> chair with CGA/S with LRAD for time up in chair for completion of ADL activity. 4.  Patient will ambulate >50 feet with LRAD/Min-CGA for improved functional mobility/safe discharge. .      Pt refused PT due to:    []  Nausea/vomiting  []  Eating  []  Pain  []  Pt lethargic  [x]  Pt stated he is on the bedpan and does not want to participate at this time; encouraged pt to get onto [facility] but pt declined that as well. Will f/u later as schedule allows. Thank you.     Kelsy Limon PT, DPT

## 2018-03-04 NOTE — PROGRESS NOTES
ABG'S OBTAINED AS ORDERED. METABOLIC ACIDOSIS NOTED.   HAD PATIENT RESUME O2 @ 2L NC AS SaO2 WAS 88% ON RA.

## 2018-03-04 NOTE — PROGRESS NOTES
4611: Shift Summary: Vital signs remained stable; No C/o pain or discomfort; Cardiac rhythm: NSR; PM meds administered without difficulty; 2 loose stools overnight; Call bell left at reach; bed at lowest position; and wheels locked     Bedside shift change report given to Cecilio Coelho (oncoming nurse) by [facility]DR[facility] ([facility]). Report included the following information SBAR and Kardex.

## 2018-03-04 NOTE — PROGRESS NOTES
Problem: Mobility Impaired (Adult and Pediatric)  Goal: *Acute Goals and Plan of Care (Insert Text)  Physical Therapy Goals LT/ST  Initiated 3/1/2018 and to be accomplished within 5-7 day(s)  1. Patient will move from supine <> sit with S in prep for out of bed activity and change of position. 2.  Patient will perform sit<> stand with CGA/S with LRAD in prep for transfers/ambulation. 3.  Patient will transfer from bed <> chair with CGA/S with LRAD for time up in chair for completion of ADL activity. 4.  Patient will ambulate >50 feet with LRAD/Min-CGA for improved functional mobility/safe discharge. .      physical Therapy TREATMENT    Patient: Darlene Bella (40 y.o. male)  Date: 3/4/2018  Diagnosis: Acute exacerbation of CHF (congestive heart failure) ([facility])  Acute on chronic renal insufficiency  Hyponatremia  Anemia of chronic disease Acute exacerbation of CHF (congestive heart failure) ([facility] [address]  Precautions: Fall   Chart, physical therapy assessment, plan of care and goals were reviewed. ASSESSMENT:  Pt assisted off bedpan and cleaned up with assistance from nursing staff. Pt with very loose BM. Assessed pt's residual limb which was in the  which was very tight with edema of residual limb; pt stated he hadn't taken off  in days. Pt's skin very maserated with fluid collected at bottom of . Cleaned  out and told pt to have it and his skin air dry until donning it again. Nursing staff in the room at the end of PT session. Progression toward goals:  []      Improving appropriately and progressing toward goals  [x]      Improving slowly and progressing toward goals  []      Not making progress toward goals and plan of care will be adjusted     PLAN:  Patient continues to benefit from skilled intervention to address the above impairments. Continue treatment per established plan of care. Discharge Recommendations:   To Be Determined  Further Equipment Recommendations for Discharge:  TBD     SUBJECTIVE:   Patient stated My stomach hurts so bad today.     OBJECTIVE DATA SUMMARY:   Critical Behavior:  Neurologic State: Agitated, Appropriate for age  Orientation Level: Appropriate for age, Oriented X4  Cognition: Appropriate decision making, Appropriate for age attention/concentration, Appropriate safety awareness, Follows commands  Safety/Judgement: Awareness of environment, Fall prevention, Good awareness of safety precautions, Insight into deficits  Functional Mobility Training:  Bed Mobility:   Supine to Sit: Minimum assistance  Sit to Supine: Minimum assistance  Scooting: Minimum assistance   Balance:  Sitting: Intact  Pain:  Pain Scale 1: Numeric (0 - 10)  Pain Intensity 1: 6  Pain Location 1: Abdomen  Pain Orientation 1: Anterior   Activity Tolerance:   Fair  Please refer to the flowsheet for vital signs taken during this treatment.   After treatment:   [] Patient left in no apparent distress sitting up in chair  [x] Patient left in no apparent distress in bed  [x] Call bell left within reach  [x] Nursing notified  [] Caregiver present  [] Bed alarm activated        Julia Limon PT, DPT     Time Calculation: 16 mins

## 2018-03-05 ENCOUNTER — APPOINTMENT (OUTPATIENT)
Dept: NUCLEAR MEDICINE | Age: 48
DRG: 194 | End: 2018-03-05
Attending: INTERNAL MEDICINE
Payer: MEDICAID

## 2018-03-05 LAB
ANION GAP SERPL CALC-SCNC: 9 MMOL/L (ref 3–18)
ATTENDING PHYSICIAN, CST07: NORMAL
BUN SERPL-MCNC: 48 MG/DL (ref 7–18)
BUN/CREAT SERPL: 14 (ref 12–20)
CALCIUM SERPL-MCNC: 7.3 MG/DL (ref 8.5–10.1)
CHLORIDE SERPL-SCNC: 116 MMOL/L (ref 100–108)
CO2 SERPL-SCNC: 19 MMOL/L (ref 21–32)
CREAT SERPL-MCNC: 3.35 MG/DL (ref 0.6–1.3)
DIAGNOSIS, 93000: NORMAL
DUKE TM SCORE RESULT, CST14: NORMAL
DUKE TREADMILL SCORE, CST13: 1
ECG INTERP BEFORE EX, CST11: NORMAL
ECG INTERP DURING EX, CST12: NORMAL
ERYTHROCYTE [DISTWIDTH] IN BLOOD BY AUTOMATED COUNT: 14.2 % (ref 11.6–14.5)
FUNCTIONAL CAPACITY, CST17: NORMAL
GLUCOSE SERPL-MCNC: 107 MG/DL (ref 74–99)
HCT VFR BLD AUTO: 22.5 % (ref 36–48)
HGB BLD-MCNC: 7.4 G/DL (ref 13–16)
KNOWN CARDIAC CONDITION, CST08: NORMAL
MAX. DIASTOLIC BP, CST04: 91 MMHG
MAX. HEART RATE, CST05: 90 BPM
MAX. SYSTOLIC BP, CST03: 173 MMHG
MCH RBC QN AUTO: 26.7 PG (ref 24–34)
MCHC RBC AUTO-ENTMCNC: 32.9 G/DL (ref 31–37)
MCV RBC AUTO: 81.2 FL (ref 74–97)
OVERALL BP RESPONSE TO EXERCISE, CST16: NORMAL
OVERALL HR RESPONSE TO EXERCISE, CST15: NORMAL
PEAK EX METS, CST10: 1 METS
PLATELET # BLD AUTO: 210 K/UL (ref 135–420)
PMV BLD AUTO: 10.3 FL (ref 9.2–11.8)
POTASSIUM SERPL-SCNC: 3.6 MMOL/L (ref 3.5–5.5)
PROTOCOL NAME, CST01: NORMAL
RBC # BLD AUTO: 2.77 M/UL (ref 4.7–5.5)
SODIUM SERPL-SCNC: 144 MMOL/L (ref 136–145)
TEST INDICATION, CST09: NORMAL
WBC # BLD AUTO: 5.5 K/UL (ref 4.6–13.2)

## 2018-03-05 PROCEDURE — 78452 HT MUSCLE IMAGE SPECT MULT: CPT

## 2018-03-05 PROCEDURE — 74011250637 HC RX REV CODE- 250/637: Performed by: INTERNAL MEDICINE

## 2018-03-05 PROCEDURE — C9113 INJ PANTOPRAZOLE SODIUM, VIA: HCPCS | Performed by: FAMILY MEDICINE

## 2018-03-05 PROCEDURE — 30233N1 TRANSFUSION OF NONAUTOLOGOUS RED BLOOD CELLS INTO PERIPHERAL VEIN, PERCUTANEOUS APPROACH: ICD-10-PCS | Performed by: INTERNAL MEDICINE

## 2018-03-05 PROCEDURE — 74011250636 HC RX REV CODE- 250/636: Performed by: INTERNAL MEDICINE

## 2018-03-05 PROCEDURE — 36430 TRANSFUSION BLD/BLD COMPNT: CPT

## 2018-03-05 PROCEDURE — 36415 COLL VENOUS BLD VENIPUNCTURE: CPT | Performed by: PHYSICIAN ASSISTANT

## 2018-03-05 PROCEDURE — 85027 COMPLETE CBC AUTOMATED: CPT | Performed by: PHYSICIAN ASSISTANT

## 2018-03-05 PROCEDURE — 86900 BLOOD TYPING SEROLOGIC ABO: CPT | Performed by: HOSPITALIST

## 2018-03-05 PROCEDURE — 77010033678 HC OXYGEN DAILY

## 2018-03-05 PROCEDURE — 80048 BASIC METABOLIC PNL TOTAL CA: CPT | Performed by: PHYSICIAN ASSISTANT

## 2018-03-05 PROCEDURE — 74011250636 HC RX REV CODE- 250/636: Performed by: FAMILY MEDICINE

## 2018-03-05 PROCEDURE — 65660000000 HC RM CCU STEPDOWN

## 2018-03-05 PROCEDURE — P9016 RBC LEUKOCYTES REDUCED: HCPCS | Performed by: HOSPITALIST

## 2018-03-05 PROCEDURE — 86920 COMPATIBILITY TEST SPIN: CPT | Performed by: HOSPITALIST

## 2018-03-05 PROCEDURE — 74011250637 HC RX REV CODE- 250/637: Performed by: FAMILY MEDICINE

## 2018-03-05 RX ORDER — SODIUM CHLORIDE 9 MG/ML
250 INJECTION, SOLUTION INTRAVENOUS AS NEEDED
Status: DISCONTINUED | OUTPATIENT
Start: 2018-03-05 | End: 2018-03-07 | Stop reason: HOSPADM

## 2018-03-05 RX ADMIN — CARVEDILOL 25 MG: 12.5 TABLET, FILM COATED ORAL at 13:36

## 2018-03-05 RX ADMIN — HYDRALAZINE HYDROCHLORIDE 25 MG: 25 TABLET, FILM COATED ORAL at 18:31

## 2018-03-05 RX ADMIN — Medication 500 MG: at 20:34

## 2018-03-05 RX ADMIN — NYSTATIN: 100000 POWDER TOPICAL at 20:35

## 2018-03-05 RX ADMIN — CARVEDILOL 25 MG: 12.5 TABLET, FILM COATED ORAL at 18:31

## 2018-03-05 RX ADMIN — FUROSEMIDE 80 MG: 10 INJECTION, SOLUTION INTRAMUSCULAR; INTRAVENOUS at 20:34

## 2018-03-05 RX ADMIN — AMLODIPINE BESYLATE 10 MG: 5 TABLET ORAL at 13:15

## 2018-03-05 RX ADMIN — ISOSORBIDE MONONITRATE 60 MG: 60 TABLET, EXTENDED RELEASE ORAL at 13:14

## 2018-03-05 RX ADMIN — FUROSEMIDE 80 MG: 10 INJECTION, SOLUTION INTRAMUSCULAR; INTRAVENOUS at 13:16

## 2018-03-05 RX ADMIN — SODIUM BICARBONATE 650 MG TABLET 1300 MG: at 13:15

## 2018-03-05 RX ADMIN — PANTOPRAZOLE SODIUM 40 MG: 40 INJECTION, POWDER, FOR SOLUTION INTRAVENOUS at 13:16

## 2018-03-05 RX ADMIN — LOPERAMIDE HYDROCHLORIDE 2 MG: 2 CAPSULE ORAL at 20:34

## 2018-03-05 RX ADMIN — Medication 500 MG: at 13:15

## 2018-03-05 RX ADMIN — HYDRALAZINE HYDROCHLORIDE 25 MG: 25 TABLET, FILM COATED ORAL at 13:15

## 2018-03-05 RX ADMIN — SODIUM BICARBONATE 650 MG TABLET 1300 MG: at 20:34

## 2018-03-05 RX ADMIN — ERYTHROPOIETIN 6000 UNITS: 3000 INJECTION, SOLUTION INTRAVENOUS; SUBCUTANEOUS at 18:31

## 2018-03-05 RX ADMIN — HYDRALAZINE HYDROCHLORIDE 25 MG: 25 TABLET, FILM COATED ORAL at 23:04

## 2018-03-05 RX ADMIN — LOPERAMIDE HYDROCHLORIDE 2 MG: 2 CAPSULE ORAL at 13:15

## 2018-03-05 RX ADMIN — REGADENOSON 0.4 MG: 0.08 INJECTION, SOLUTION INTRAVENOUS at 11:59

## 2018-03-05 RX ADMIN — ACETAMINOPHEN 650 MG: 325 TABLET ORAL at 13:16

## 2018-03-05 NOTE — ROUTINE PROCESS
Bedside and Verbal shift change report given to Larisa Hendrix RN (oncoming nurse) by Chanel Chiang RN (offgoing nurse). Report included the following information SBAR and Kardex.

## 2018-03-05 NOTE — ROUTINE PROCESS
Bedside and Verbal shift change report given to Humza Valle RN (oncoming nurse) by Juan Francisco Jason RN (offgoing nurse). Report included the following information SBAR and Kardex.

## 2018-03-05 NOTE — PROGRESS NOTES
18 - Received report from Jericho Roman RN(offgoing nurse). Assumed care of PT.     4521 - Patient off floor. 1255 - Patient back on floor. Linen changed and patient bathed due to incontinence. PT assessed. Bed in low. Bedrails x3. Pain is 0(number) out of 10. PT oriented to room & given instructions regarding call bell, incentive spirometer, room phone, medications, and pain medications with potential side effects. PT encouraged to use call bell. Phone & call bell left in reach of PT.      1316 - Patient pain 7/10. Tylenol, and Imodium administered in response to patient request.    1922 - Bedside and Verbal shift change report given to Louisa Chisholm RN (oncoming nurse) by Lelo Lobato RN (offgoing nurse). Report included the following information SBAR, Kardex, Procedure Summary, Intake/Output, MAR, Accordion, Recent Results and Med Rec Status.

## 2018-03-05 NOTE — ROUTINE PROCESS
Bedside and Verbal shift change report given to SANDRA Lau (oncoming nurse) by Bart Tompkins RN   (offgoing nurse). Report included the following information SBAR, Kardex, Intake/Output, MAR, Recent Results and Med Rec Status.

## 2018-03-05 NOTE — PROGRESS NOTES
Hospitalist Progress Note-critical care note     Patient: Derrek Qureshi MRN: [id number]  CSN: [id number]    YOB: 1970  Age: 41 y.o. Sex: male    DOA: 2/28/2018 LOS:  LOS: 5 days            Chief complaint: anemia , hayley on ckd , chf exacerbation     Assessment/Plan         Hospital Problems  Date Reviewed: 3/4/2018          Codes Class Noted POA    Iron deficiency anemia ICD-10-CM: D50.9  ICD-9-CM: 280.9  3/4/2018 Unknown        Other restrictive cardiomyopathy ([facility] ICD-10-CM: I42.5  ICD-9-CM: 425.4  3/4/2018 Unknown        Hyponatremia ICD-10-CM: E87.1  ICD-9-CM: 276.1  2/28/2018 Unknown        Anemia of chronic disease ICD-10-CM: D63.8  ICD-9-CM: 285.29  2/28/2018 Unknown        Acute on chronic renal insufficiency ICD-10-CM: N28.9, N18.9  ICD-9-CM: 593.9, 585.9  2/28/2018 Unknown        * (Principal)Acute exacerbation of CHF (congestive heart failure) ([facility] ICD-10-CM: I50.9  ICD-9-CM: 428.0  2/28/2018 Unknown              1 Restrictive cardiomyopathy   Continue lasix. EF has dropped from 70% to 40-50% since prior echo done at [facility]. On Imdur  Cardiology on board  Stress test on Monday if Hb>8.0       Acute on chronic anemia   2nd to CKD , occult negative   Will give one units prbc .     on epogen   Diarrhea:   probiotics,       CKD 3-4:  Nephrology on case      HTN: incontrolled. increase hydralazine      H/o BKA   Fall precaution      Heavy proteinuriea and significant hypoalbuminemia,        Mobilize w PT  Disposition :2-3 days   Review of systems:    General: No fevers or chills. Cardiovascular: No chest pain or pressure. No palpitations. Pulmonary: No shortness of breath. Gastrointestinal: No nausea, vomiting.      Vital signs/Intake and Output:  Visit Vitals    BP (!) 154/94    Pulse 89    Temp 97.9 °F (36.6 °C)    Resp 18    Ht 5' 7\" (1.702 m)    Wt 111.2 kg (245 lb 2.4 oz)    SpO2 100%    BMI 38.4 kg/m2     Current Shift:  03/05 0701 - 03/05 1900  In: 350 [P.O.:350]  Out: 900 [Urine:900]  Last three shifts:  03/03 1901 - 03/05 0700  In: 377 [P.O.:651]  Out: 2900 [Urine:2900]    Physical Exam:  General: WD, WN. Alert, cooperative, no acute distress    HEENT: NC, Atraumatic. PERRLA, anicteric sclerae. Lungs: CTA Bilaterally. No Wheezing/Rhonchi/Rales. Heart:  Regular  rhythm,  No murmur, No Rubs, No Gallops  Abdomen: Soft, Non distended, Non tender.  +Bowel sounds,   Extremities: No c/c/e, r bka   Psych:   Not anxious or agitated. Neurologic:  No acute neurological deficit. Labs: Results:       Chemistry Recent Labs      03/05/18 0429 03/04/18   0545  03/03/18   0648   GLU  107*  93  94   NA  144  147*  145   K  3.6  3.7  3.6   CL  116*  118*  116*   CO2  19*  18*  18*   BUN  48*  46*  42*   CREA  3.35*  3.47*  3.32*   CA  7.3*  7.3*  7.8*   AGAP  9  11  11   BUCR  14  13  13      CBC w/Diff Recent Labs      03/05/18 0429 03/04/18   0545  03/03/18   0648   WBC  5.5  5.8  6.2   RBC  2.77*  2.92*  3.48*   HGB  7.4*  7.9*  9.4*   HCT  22.5*  23.8*  28.7*   PLT  210  234  234      Cardiac Enzymes No results for input(s): CPK, CKND1, WILLIE in the last 72 hours. No lab exists for component: CKRMB, TROIP   Coagulation No results for input(s): PTP, INR, APTT in the last 72 hours. No lab exists for component: INREXT    Lipid Panel No results found for: CHOL, CHOLPOCT, CHOLX, CHLST, CHOLV, 627205, HDL, LDL, LDLC, DLDLP, 214988, VLDLC, VLDL, TGLX, TRIGL, TRIGP, TGLPOCT, CHHD, CHHDX   BNP No results for input(s): BNPP in the last 72 hours. Liver Enzymes No results for input(s): TP, ALB, TBIL, AP, SGOT, GPT in the last 72 hours.     No lab exists for component: DBIL   Thyroid Studies No results found for: T4, T3U, TSH, TSHEXT     Procedures/imaging: see electronic medical records for all procedures/Xrays and details which were not copied into this note but were reviewed prior to creation of Latonya Matute MD

## 2018-03-05 NOTE — PROGRESS NOTES
D/c plan: Home with [facility]al[facility] Rainey when medically cleared  Unable to meet with patient he is off the unit at this time. Cm will continue to follow  Met with patient at bedside during IDR's. Provided with list of foc for Rosalinda Rainey and has chosen to use [facility] [phone] when medically cleared for d/c. Patient states he has gone to rehab before and he does not want to go back. States he lives with his brother who can help him. Informed patient what if Pt has recommendations for Rehab he stated \" I am going home\" ja did inform cm he has a wheelchair and rolling walker at home no other needs   Care Management Interventions  PCP Verified by CM: Yes  Mode of Transport at Discharge:  Other (see comment) (friend/family)  Transition of Care Consult (CM Consult): [address]: Yes  Health Maintenance Reviewed: Yes  Physical Therapy Consult: Yes  Occupational Therapy Consult: Yes  Current Support Network: Lives Alone  Confirm Follow Up Transport: Family  Plan discussed with Pt/Family/Caregiver: Yes  Freedom of Choice Offered: Yes  Discharge Location  Discharge Placement: Home with home health

## 2018-03-05 NOTE — PROGRESS NOTES
2333-Resting quietly in bed. Stable. No complaints. Call light and personal items within reach. 0008-Assessment complete. Resting in bed. Abdomen large, round, and soft. Right BKA well approximated. Left lower leg has leathery skin from lower leg to foot. Patient denies pain at this time. Call light within reach. Bed alarm activated. 0453-No change from initial assessment. 0632-Resting in bed. Denies pain. Call light within reach. Shift Summary:  Uneventful shift. Periodic restlessness during night. Stable.

## 2018-03-06 LAB
ABO + RH BLD: NORMAL
ALBUMIN SERPL-MCNC: 1.6 G/DL (ref 3.4–5)
ANION GAP SERPL CALC-SCNC: 11 MMOL/L (ref 3–18)
BLD PROD TYP BPU: NORMAL
BLOOD GROUP ANTIBODIES SERPL: NORMAL
BPU ID: NORMAL
BUN SERPL-MCNC: 50 MG/DL (ref 7–18)
BUN/CREAT SERPL: 14 (ref 12–20)
CALCIUM SERPL-MCNC: 7.4 MG/DL (ref 8.5–10.1)
CALLED TO:,BCALL1: NORMAL
CHLORIDE SERPL-SCNC: 115 MMOL/L (ref 100–108)
CO2 SERPL-SCNC: 18 MMOL/L (ref 21–32)
CREAT SERPL-MCNC: 3.56 MG/DL (ref 0.6–1.3)
CROSSMATCH RESULT,%XM: NORMAL
ERYTHROCYTE [DISTWIDTH] IN BLOOD BY AUTOMATED COUNT: 13.7 % (ref 11.6–14.5)
GLUCOSE SERPL-MCNC: 100 MG/DL (ref 74–99)
HCT VFR BLD AUTO: 25.7 % (ref 36–48)
HGB BLD-MCNC: 8.8 G/DL (ref 13–16)
MCH RBC QN AUTO: 28.2 PG (ref 24–34)
MCHC RBC AUTO-ENTMCNC: 34.2 G/DL (ref 31–37)
MCV RBC AUTO: 82.4 FL (ref 74–97)
PHOSPHATE SERPL-MCNC: 4.4 MG/DL (ref 2.5–4.9)
PLATELET # BLD AUTO: 218 K/UL (ref 135–420)
PMV BLD AUTO: 10.7 FL (ref 9.2–11.8)
POTASSIUM SERPL-SCNC: 3.5 MMOL/L (ref 3.5–5.5)
RBC # BLD AUTO: 3.12 M/UL (ref 4.7–5.5)
SODIUM SERPL-SCNC: 144 MMOL/L (ref 136–145)
SPECIMEN EXP DATE BLD: NORMAL
STATUS OF UNIT,%ST: NORMAL
UNIT DIVISION, %UDIV: 0
WBC # BLD AUTO: 7.6 K/UL (ref 4.6–13.2)

## 2018-03-06 PROCEDURE — 77010033678 HC OXYGEN DAILY

## 2018-03-06 PROCEDURE — 36415 COLL VENOUS BLD VENIPUNCTURE: CPT | Performed by: INTERNAL MEDICINE

## 2018-03-06 PROCEDURE — 74011250637 HC RX REV CODE- 250/637: Performed by: INTERNAL MEDICINE

## 2018-03-06 PROCEDURE — 85027 COMPLETE CBC AUTOMATED: CPT | Performed by: INTERNAL MEDICINE

## 2018-03-06 PROCEDURE — 65660000000 HC RM CCU STEPDOWN

## 2018-03-06 PROCEDURE — 74011250636 HC RX REV CODE- 250/636: Performed by: FAMILY MEDICINE

## 2018-03-06 PROCEDURE — 74011250637 HC RX REV CODE- 250/637: Performed by: FAMILY MEDICINE

## 2018-03-06 PROCEDURE — C9113 INJ PANTOPRAZOLE SODIUM, VIA: HCPCS | Performed by: FAMILY MEDICINE

## 2018-03-06 PROCEDURE — 80069 RENAL FUNCTION PANEL: CPT | Performed by: INTERNAL MEDICINE

## 2018-03-06 PROCEDURE — 74011250636 HC RX REV CODE- 250/636: Performed by: INTERNAL MEDICINE

## 2018-03-06 PROCEDURE — 97110 THERAPEUTIC EXERCISES: CPT

## 2018-03-06 RX ORDER — FUROSEMIDE 10 MG/ML
80 INJECTION INTRAMUSCULAR; INTRAVENOUS DAILY
Status: DISCONTINUED | OUTPATIENT
Start: 2018-03-07 | End: 2018-03-07 | Stop reason: HOSPADM

## 2018-03-06 RX ADMIN — ISOSORBIDE MONONITRATE 60 MG: 60 TABLET, EXTENDED RELEASE ORAL at 09:22

## 2018-03-06 RX ADMIN — HYDRALAZINE HYDROCHLORIDE 25 MG: 25 TABLET, FILM COATED ORAL at 22:30

## 2018-03-06 RX ADMIN — NYSTATIN: 100000 POWDER TOPICAL at 22:31

## 2018-03-06 RX ADMIN — AMLODIPINE BESYLATE 10 MG: 5 TABLET ORAL at 09:22

## 2018-03-06 RX ADMIN — CARVEDILOL 25 MG: 12.5 TABLET, FILM COATED ORAL at 09:22

## 2018-03-06 RX ADMIN — PANTOPRAZOLE SODIUM 40 MG: 40 INJECTION, POWDER, FOR SOLUTION INTRAVENOUS at 09:22

## 2018-03-06 RX ADMIN — CARVEDILOL 25 MG: 12.5 TABLET, FILM COATED ORAL at 16:44

## 2018-03-06 RX ADMIN — FUROSEMIDE 80 MG: 10 INJECTION, SOLUTION INTRAMUSCULAR; INTRAVENOUS at 09:22

## 2018-03-06 RX ADMIN — Medication 500 MG: at 09:22

## 2018-03-06 RX ADMIN — LOPERAMIDE HYDROCHLORIDE 2 MG: 2 CAPSULE ORAL at 22:40

## 2018-03-06 RX ADMIN — NYSTATIN: 100000 POWDER TOPICAL at 09:00

## 2018-03-06 RX ADMIN — SODIUM BICARBONATE 650 MG TABLET 1300 MG: at 09:22

## 2018-03-06 RX ADMIN — LOPERAMIDE HYDROCHLORIDE 2 MG: 2 CAPSULE ORAL at 16:49

## 2018-03-06 RX ADMIN — HYDRALAZINE HYDROCHLORIDE 25 MG: 25 TABLET, FILM COATED ORAL at 16:44

## 2018-03-06 RX ADMIN — HYDRALAZINE HYDROCHLORIDE 25 MG: 25 TABLET, FILM COATED ORAL at 09:22

## 2018-03-06 RX ADMIN — LOPERAMIDE HYDROCHLORIDE 2 MG: 2 CAPSULE ORAL at 09:22

## 2018-03-06 RX ADMIN — SODIUM BICARBONATE 650 MG TABLET 1300 MG: at 22:30

## 2018-03-06 RX ADMIN — Medication 500 MG: at 22:30

## 2018-03-06 NOTE — PROGRESS NOTES
Problem: Falls - Risk of  Goal: *Absence of Falls  Document Vanessa Fall Risk and appropriate interventions in the flowsheet.    Outcome: Progressing Towards Goal  Fall Risk Interventions:  Mobility Interventions: Bed/chair exit alarm         Medication Interventions: Teach patient to arise slowly, Patient to call before getting OOB    Elimination Interventions: Bed/chair exit alarm, Patient to call for help with toileting needs, Toilet paper/wipes in reach, Toileting schedule/hourly rounds    History of Falls Interventions: Bed/chair exit alarm, Door open when patient unattended, Room close to nurse's station

## 2018-03-06 NOTE — PROGRESS NOTES
Problem: Mobility Impaired (Adult and Pediatric)  Goal: *Acute Goals and Plan of Care (Insert Text)  Physical Therapy Goals LT/ST  Initiated 3/1/2018 and to be accomplished within 5-7 day(s)  1. Patient will move from supine <> sit with S in prep for out of bed activity and change of position. 2.  Patient will perform sit<> stand with CGA/S with LRAD in prep for transfers/ambulation. 3.  Patient will transfer from bed <> chair with CGA/S with LRAD for time up in chair for completion of ADL activity. 4.  Patient will ambulate >50 feet with LRAD/Min-CGA for improved functional mobility/safe discharge. .        3/5/2018  PT treatment not completed due to:  [] Off Unit for testing/procedure  [] Pain  [] Eating  [] Patient too lethargic  [] Nausea/vomiting  [] Dialysis treatment in progress   [x] Other: pt off unit for extensive time today and  Reports fatigue. Currently eating and declines at this time. Will f/u tomorrow. Thank you.    Author Gary, PT

## 2018-03-06 NOTE — PROGRESS NOTES
Problem: Falls - Risk of  Goal: *Absence of Falls  Document Vanessa Fall Risk and appropriate interventions in the flowsheet.    Outcome: Progressing Towards Goal  Fall Risk Interventions:  Mobility Interventions: Bed/chair exit alarm         Medication Interventions: Teach patient to arise slowly, Patient to call before getting OOB    Elimination Interventions: Call light in reach, Patient to call for help with toileting needs, Urinal in reach    History of Falls Interventions: Bed/chair exit alarm, Door open when patient unattended, Room close to nurse's station

## 2018-03-06 NOTE — PROGRESS NOTES
D/c plan: anticipate home tomorrow  Met with patient during IDR's. He continues to state he wants to go home when he is medically cleared. Refused Rehab. Provided with list of foc for [facility] and has chosen to use [facility] [phone] when medically cleared for d/c. Patient states he has gone to rehab before and he does not want to go back. States he lives with his brother who can help him. Informed patient what if Pt has recommendations for Rehab he stated \" I am going home\" ja did inform cm he has a wheelchair and rolling walker at home no other needs   RN please note patient is on oxygen and will need documentation of \"walk test\"     Care Management Interventions  PCP Verified by CM: Yes  Mode of Transport at Discharge:  Other (see comment) (friend/family)  Transition of Care Consult (CM Consult): [address]: Yes  Health Maintenance Reviewed: Yes  Physical Therapy Consult: Yes  Occupational Therapy Consult: Yes  Current Support Network: Lives Alone  Confirm Follow Up Transport: Family  Plan discussed with Pt/Family/Caregiver: Yes  Freedom of Choice Offered: Yes  Discharge Location  Discharge Placement: Home with home health

## 2018-03-06 NOTE — PROGRESS NOTES
Nephrology Progress note    Subjective:        Jina Richmond Jr. is a 41 y. o. male with a past medical history significant for DM, HTN, Sickle Cell trait, diastolic CHF, s/p R BKA, CKD who presented with c/o worsening SOB ~ 2 weeks. Initial evaluation consistent with CHF decompensation. Labs revealed a S Cr of 3.0, S. Alb 1.8 and Anemia Hgb 8. 3.  Patient admitted and given IV diuretic Furosemide. Review of records from recent hospitalization at [facility] in Nov 2017 reveals a S Cr in range of 2.5 to 2.8. Serologies including Hep B and C,as well as HIV were neg., Complements C3 and C4 were normal. Serum and Urine Protein Electrophoresis did not reveal monoclonal spike. A 24 hr Urine Protein was 2.2 gms. PTH was normal.  ECHO showed EF 60%. Back in Aug 2017,  S Cr was in the range of 1.8 to 2.2. SALEEM was pos at a titer of 1: 40 speckled pattern. Anti DS-DNA and Anti-Smith Ab were neg.   SOB improving per patient. Had Nuclear Cardiac stress test earlier today.     Admit Date: 2/28/2018  Principal Problem:    Acute exacerbation of CHF (congestive heart failure) ([address] (2/28/2018)    Active Problems:    Hyponatremia (2/28/2018)      Anemia of chronic disease (2/28/2018)      Acute on chronic renal insufficiency (2/28/2018)      Iron deficiency anemia (3/4/2018)      Other restrictive cardiomyopathy (HCC) (3/4/2018)      Current Facility-Administered Medications   Medication Dose Route Frequency    0.9% sodium chloride infusion 250 mL  250 mL IntraVENous PRN    epoetin delonte (EPOGEN;PROCRIT) injection 6,000 Units  6,000 Units SubCUTAneous Q MON, WED & FRI    pantoprazole (PROTONIX) 40 mg in sodium chloride 10 mL injection  40 mg IntraVENous DAILY    hydrALAZINE (APRESOLINE) tablet 25 mg  25 mg Oral TID    sodium bicarbonate tablet 1,300 mg  1,300 mg Oral BID    loperamide (IMODIUM) capsule 2 mg  2 mg Oral TID PRN    Saccharomyces boulardii (FLORASTOR) capsule 500 mg  500 mg Oral BID    isosorbide mononitrate ER (IMDUR) tablet 60 mg  60 mg Oral DAILY    amLODIPine (NORVASC) tablet 10 mg  10 mg Oral DAILY    nystatin (MYCOSTATIN) 100,000 unit/gram powder   Topical BID    furosemide (LASIX) injection 80 mg  80 mg IntraVENous Q12H    acetaminophen (TYLENOL) tablet 650 mg  650 mg Oral Q4H PRN    carvedilol (COREG) tablet 25 mg  25 mg Oral BID WITH MEALS    acetaminophen (TYLENOL) tablet 650 mg  650 mg Oral Q6H PRN         Allergy:   No Known Allergies     Objective:     Visit Vitals    /86 (BP 1 Location: Left arm, BP Patient Position: At rest;Supine; Head of bed elevated (Comment degrees))    Pulse 88    Temp 97.3 °F (36.3 °C)    Resp 16    Ht 5' 7\" (1.702 m)    Wt 111.2 kg (245 lb 2.4 oz)    SpO2 100%    BMI 38.4 kg/m2         Intake/Output Summary (Last 24 hours) at 03/05/18 1903  Last data filed at 03/05/18 1446   Gross per 24 hour   Intake              450 ml   Output             2400 ml   Net            -1950 ml       Physical Exam:       General: No acute distress   HENT: Atraumatic and normocephalic   Eyes: Normal conjunctiva   Neck: Supple    Cardiovascular: Normal S1 & S2, no m/r/g   Pulmonary/Chest Wall: Clear to auscultation bilaterally   Abdominal: Soft and non-tender   Musculoskeletal: no edema   Neurological: No focal deficits     Data Review:  Lab Results   Component Value Date/Time    Sodium 144 03/05/2018 04:29 AM    Potassium 3.6 03/05/2018 04:29 AM    Chloride 116 (H) 03/05/2018 04:29 AM    CO2 19 (L) 03/05/2018 04:29 AM    Anion gap 9 03/05/2018 04:29 AM    Glucose 107 (H) 03/05/2018 04:29 AM    BUN 48 (H) 03/05/2018 04:29 AM    Creatinine 3.35 (H) 03/05/2018 04:29 AM    BUN/Creatinine ratio 14 03/05/2018 04:29 AM    GFR est AA 24 (L) 03/05/2018 04:29 AM    GFR est non-AA 20 (L) 03/05/2018 04:29 AM    Calcium 7.3 (L) 03/05/2018 04:29 AM     Lab Results   Component Value Date/Time    WBC 5.5 03/05/2018 04:29 AM    HGB 7.4 (L) 03/05/2018 04:29 AM    HCT 22.5 (L) 03/05/2018 04:29 AM PLATELET 001 88/28/3875 04:29 AM    MCV 81.2 03/05/2018 04:29 AM     Lab Results   Component Value Date/Time    Calcium 7.3 (L) 03/05/2018 04:29 AM    Phosphorus 4.1 03/02/2018 04:08 AM     Lab Results   Component Value Date/Time    Iron 56 03/04/2018 05:45 AM    TIBC 119 (L) 03/04/2018 05:45 AM    Iron % saturation 47 03/04/2018 05:45 AM    Ferritin 1043 (H) 03/02/2018 04:08 AM     Lab Results   Component Value Date/Time    Ferritin 1043 (H) 03/02/2018 04:08 AM         Impression:     1. Acute Kidney Injury on CKD 3/4, likely sec to CHF decompensation with decreased Renal perfusion vs progression of CKD. S Cr 3.35 today, fairly stable since admission  2. CKD3/4 with proteinuria likely sec to Diabetic Nephropathy/Hypertensive Kidney disease. Previous work up at [facility] and [facility] in 2017 noted. Serologies, SPEP, UPEP, largely unremarkable except for+ SALEEM at a low titer. 3. Systolic/Diastolic CHF decompensation. Most recent ECHO EF 40 to 45%  4. Anemia , likely sec to CKD. TSAT adequate  5. Metabolic Acidosis  6. Hypernatremia, mild  7. Hypoalbuminemia  8. DM  9. HTN  10. S/p R BKA    Plan:     1. Continue  IV diuretic   2. Continue oral  sodium Bicarbonate  3. EPO for Anemia  4. No indication for initiating HD presently, we will continue to monitor Renal function.     Julia Rousseau MD, MPH [facility]  [phone]

## 2018-03-06 NOTE — PROGRESS NOTES
Problem: Mobility Impaired (Adult and Pediatric)  Goal: *Acute Goals and Plan of Care (Insert Text)  Physical Therapy Goals LT/ST  Initiated 3/1/2018 and to be accomplished within 5-7 day(s)  1. Patient will move from supine <> sit with S in prep for out of bed activity and change of position. 2.  Patient will perform sit<> stand with CGA/S with LRAD in prep for transfers/ambulation. 3.  Patient will transfer from bed <> chair with CGA/S with LRAD for time up in chair for completion of ADL activity. 4.  Patient will ambulate >50 feet with LRAD/Min-CGA for improved functional mobility/safe discharge. .      Outcome: Progressing Towards Goal  physical Therapy TREATMENT    Patient: Darlene Bella (40 y.o. male)  Date: 3/6/2018  Diagnosis: Acute exacerbation of CHF (congestive heart failure) (HCC)  Acute on chronic renal insufficiency  Hyponatremia  Anemia of chronic disease Acute exacerbation of CHF (congestive heart failure) ([facility]  Precautions: Fall   Chart, physical therapy assessment, plan of care and goals were reviewed. ASSESSMENT:  Pt agreeable to bed mobility and therex in the bed. Pt's R residual limb demonstrates increased edema and is too large to fit into prosthesis. Assisted pt with reapplying  and educated pt to perform therex and LE elevation to reduce edema; pt verbalized understanding. Progression toward goals:  []      Improving appropriately and progressing toward goals  [x]      Improving slowly and progressing toward goals  []      Not making progress toward goals and plan of care will be adjusted     PLAN:  Patient continues to benefit from skilled intervention to address the above impairments. Continue treatment per established plan of care. Discharge Recommendations:  Saul Mejia  Further Equipment Recommendations for Discharge:  TBD     SUBJECTIVE:   Patient stated My right leg is so swollen it won't fit into the prosthesis.     OBJECTIVE DATA SUMMARY: Critical Behavior:  Neurologic State: Alert, Appropriate for age  Orientation Level: Oriented X4  Cognition: Appropriate decision making, Follows commands  Safety/Judgement: Awareness of environment, Fall prevention, Good awareness of safety precautions, Insight into deficits  Functional Mobility Training:  Bed Mobility:   Supine to Sit: Minimum assistance  Sit to Supine: Minimum assistance  Scooting: Minimum assistance   Balance:  Sitting: Intact  Therapeutic Exercises:   HEP included ankle pumps, heel slides, quad sets, hip abd/add, hip flexion x 5 reps  Pain:  Pain Scale 1: Numeric (0 - 10)  Pain Intensity 1: 0   Activity Tolerance:   Fair  Please refer to the flowsheet for vital signs taken during this treatment.   After treatment:   [] Patient left in no apparent distress sitting up in chair  [x] Patient left in no apparent distress in bed  [x] Call bell left within reach  [x] Nursing notified  [] Caregiver present  [] Bed alarm activated      Kelsy Limon PT, DPT     Time Calculation: 11 mins

## 2018-03-06 NOTE — PROGRESS NOTES
Hospitalist Progress Note-critical care note     Patient: Karthik Thompson. MRN: [id number]  CSN: [id number]    YOB: 1970  Age: 41 y.o. Sex: male    DOA: 2/28/2018 LOS:  LOS: 6 days            Chief complaint: anemia , hayley on ckd , chf exacerbation     Assessment/Plan         Hospital Problems  Date Reviewed: 3/4/2018          Codes Class Noted POA    Iron deficiency anemia ICD-10-CM: D50.9  ICD-9-CM: 280.9  3/4/2018 Unknown        Other restrictive cardiomyopathy ([facility] ICD-10-CM: I42.5  ICD-9-CM: 425.4  3/4/2018 Unknown        Hyponatremia ICD-10-CM: E87.1  ICD-9-CM: 276.1  2/28/2018 Unknown        Anemia of chronic disease ICD-10-CM: D63.8  ICD-9-CM: 285.29  2/28/2018 Unknown        Acute on chronic renal insufficiency ICD-10-CM: N28.9, N18.9  ICD-9-CM: 593.9, 585.9  2/28/2018 Unknown        * (Principal)Acute exacerbation of CHF (congestive heart failure) ([facility] ICD-10-CM: I50.9  ICD-9-CM: 428.0  2/28/2018 Unknown              1 Restrictive cardiomyopathy   Continue lasix. EF :45 % . On Imdur  Cardiology on board  Stress test normal        Acute on chronic anemia   2nd to CKD , occult negative   Received  Transfusion     on epogen   Diarrhea:   probiotics,       CKD 3-4:  Cr stable   Nephrology on board, recommend continue lasix       HTN: incontrolled. increase hydralazine      H/o BKA   Fall precaution      Heavy proteinuriea and significant hypoalbuminemia,        Mobilize w PT, do not want to go to rehab   Disposition :1-2 days     Will have home O2 evaluation,   Will d/c home if ok per renal     Subjective: tired   Leg still can not fit the prosthetics     Nurse: no acute issue   Review of systems:    General: No fevers or chills. Tired   Cardiovascular: No chest pain or pressure. No palpitations. Pulmonary: No shortness of breath. Gastrointestinal: No nausea, vomiting. Vital signs/Intake and Output:  Visit Vitals    /72 (BP 1 Location: Left arm, BP Patient Position:  At rest;Supine; Head of bed elevated (Comment degrees))    Pulse 88    Temp 99 °F (37.2 °C)    Resp 14    Ht 5' 7\" (1.702 m)    Wt 105.3 kg (232 lb 2.3 oz)    SpO2 100%    BMI 36.36 kg/m2     Current Shift:  03/06 0701 - 03/06 1900  In: 200 [P.O.:200]  Out: 9189 [Urine:1185]  Last three shifts:  03/04 1901 - 03/06 0700  In: 1689.6 [P.O.:1580]  Out: 0154 [Urine:3725]    Physical Exam:  General: WD, WN. Alert, cooperative, no acute distress    HEENT: NC, Atraumatic. PERRLA, anicteric sclerae. Lungs: CTA Bilaterally. No Wheezing/Rhonchi/Rales. Heart:  Regular  rhythm,  +murmur, No Rubs, No Gallops  Abdomen: Soft, Non distended, Non tender.  +Bowel sounds,   Extremities: No c/c/e, r bka   Psych:   Not anxious or agitated. Neurologic:  No acute neurological deficit. Labs: Results:       Chemistry Recent Labs      03/06/18 0424 03/05/18 0429 03/04/18   0545   GLU  100*  107*  93   NA  144  144  147*   K  3.5  3.6  3.7   CL  115*  116*  118*   CO2  18*  19*  18*   BUN  50*  48*  46*   CREA  3.56*  3.35*  3.47*   CA  7.4*  7.3*  7.3*   AGAP  11  9  11   BUCR  14  14  13   ALB  1.6*   --    --       CBC w/Diff Recent Labs      03/06/18 0424 03/05/18 0429 03/04/18   0545   WBC  7.6  5.5  5.8   RBC  3.12*  2.77*  2.92*   HGB  8.8*  7.4*  7.9*   HCT  25.7*  22.5*  23.8*   PLT  218  210  234      Cardiac Enzymes No results for input(s): CPK, CKND1, WILLIE in the last 72 hours. No lab exists for component: CKRMB, TROIP   Coagulation No results for input(s): PTP, INR, APTT in the last 72 hours. No lab exists for component: INREXT, INREXT    Lipid Panel No results found for: CHOL, CHOLPOCT, CHOLX, CHLST, CHOLV, 573731, HDL, LDL, LDLC, DLDLP, 803355, VLDLC, VLDL, TGLX, TRIGL, TRIGP, TGLPOCT, CHHD, CHHDX   BNP No results for input(s): BNPP in the last 72 hours.    Liver Enzymes Recent Labs      03/06/18   0424   ALB  1.6*      Thyroid Studies No results found for: T4, T3U, TSH, TSHEXT, TSHEXT Procedures/imaging: see electronic medical records for all procedures/Xrays and details which were not copied into this note but were reviewed prior to creation of Marcelo Pringle MD

## 2018-03-06 NOTE — ROUTINE PROCESS
Bedside and Verbal shift change report given to Mikael Griffiths RN (oncoming nurse) by FRANCISCO Proctor RN (offgoing nurse). Report included the following information SBAR, Kardex, Intake/Output, MAR and Recent Results.

## 2018-03-06 NOTE — PROGRESS NOTES
[address] care of pt at this time. Pt in bed with no signs of distress. Pt left with call light within reach and encouraged to call for assistance. 2034 - Head to toes assessment performed at this time. Pt complained of no chest pain or SOB. Pt reported no unusual signs of numbness or tingling to lower extremities. Pt left in bed with no signs of distress. Call light within reach, bed in the low position and wheels locked. Will continue to monitor. 2228 - One unit of RBC being infused to pt. Will continue to monitor. Pt presents with no signs of distress. Shift summary - Pt had no complaints of pain. Pt received incontinence care through the night. Pt left with no signs of distress.

## 2018-03-06 NOTE — PROGRESS NOTES
Problem: Mobility Impaired (Adult and Pediatric)  Goal: *Acute Goals and Plan of Care (Insert Text)  Physical Therapy Goals LT/ST  Initiated 3/1/2018 and to be accomplished within 5-7 day(s)  1. Patient will move from supine <> sit with S in prep for out of bed activity and change of position. 2.  Patient will perform sit<> stand with CGA/S with LRAD in prep for transfers/ambulation. 3.  Patient will transfer from bed <> chair with CGA/S with LRAD for time up in chair for completion of ADL activity. 4.  Patient will ambulate >50 feet with LRAD/Min-CGA for improved functional mobility/safe discharge. .      Pt refused PT due to:    []  Nausea/vomiting  []  Eating  []  Pain  []  Pt lethargic  [x]  Pt stated he was too tired to participate.     Deyanira Limon PT, DPT

## 2018-03-06 NOTE — PROGRESS NOTES
Nephrology Progress note    Subjective:        Tracy Leon Jr. is a 41 y. o. male with a past medical history significant for DM, HTN, Sickle Cell trait, diastolic CHF, s/p R BKA, CKD who presented with c/o worsening SOB ~ 2 weeks. Initial evaluation consistent with CHF decompensation. Labs revealed a S Cr of 3.0, S. Alb 1.8 and Anemia Hgb 8. 3.  Patient admitted and given IV diuretic Furosemide. Review of records from recent hospitalization at [facility] in Nov 2017 reveals a S Cr in range of 2.5 to 2.8. Serologies including Hep B and C,as well as HIV were neg., Complements C3 and C4 were normal. Serum and Urine Protein Electrophoresis did not reveal monoclonal spike. A 24 hr Urine Protein was 2.2 gms. PTH was normal.  ECHO showed EF 60%. Back in Aug 2017,  S Cr was in the range of 1.8 to 2.2. SALEEM was pos at a titer of 1: 40 speckled pattern. Anti DS-DNA and Anti-Smith Ab were neg.   SOB improving per patient. Had Nuclear Cardiac stress test on 3/5, No evidence of Ischemia.  EF 42%    Admit Date: 2/28/2018  Principal Problem:    Acute exacerbation of CHF (congestive heart failure) (HCC) (2/28/2018)    Active Problems:    Hyponatremia (2/28/2018)      Anemia of chronic disease (2/28/2018)      Acute on chronic renal insufficiency (2/28/2018)      Iron deficiency anemia (3/4/2018)      Other restrictive cardiomyopathy (HCC) (3/4/2018)      Current Facility-Administered Medications   Medication Dose Route Frequency    0.9% sodium chloride infusion 250 mL  250 mL IntraVENous PRN    epoetin delonte (EPOGEN;PROCRIT) injection 6,000 Units  6,000 Units SubCUTAneous Q MON, WED & FRI    pantoprazole (PROTONIX) 40 mg in sodium chloride 10 mL injection  40 mg IntraVENous DAILY    hydrALAZINE (APRESOLINE) tablet 25 mg  25 mg Oral TID    sodium bicarbonate tablet 1,300 mg  1,300 mg Oral BID    loperamide (IMODIUM) capsule 2 mg  2 mg Oral TID PRN    Saccharomyces boulardii (FLORASTOR) capsule 500 mg  500 mg Oral BID    isosorbide mononitrate ER (IMDUR) tablet 60 mg  60 mg Oral DAILY    amLODIPine (NORVASC) tablet 10 mg  10 mg Oral DAILY    nystatin (MYCOSTATIN) 100,000 unit/gram powder   Topical BID    furosemide (LASIX) injection 80 mg  80 mg IntraVENous Q12H    acetaminophen (TYLENOL) tablet 650 mg  650 mg Oral Q4H PRN    carvedilol (COREG) tablet 25 mg  25 mg Oral BID WITH MEALS    acetaminophen (TYLENOL) tablet 650 mg  650 mg Oral Q6H PRN         Allergy:   No Known Allergies     Objective:     Visit Vitals    /79 (BP 1 Location: Left arm, BP Patient Position: At rest;Supine; Head of bed elevated (Comment degrees))    Pulse 80    Temp 98.7 °F (37.1 °C)    Resp 15    Ht 5' 7\" (1.702 m)    Wt 105.3 kg (232 lb 2.3 oz)    SpO2 99%    BMI 36.36 kg/m2         Intake/Output Summary (Last 24 hours) at 03/06/18 1452  Last data filed at 03/06/18 1329   Gross per 24 hour   Intake           1439.6 ml   Output             1925 ml   Net           -485.4 ml       Physical Exam:       General: No acute distress   HENT: Atraumatic and normocephalic   Eyes: Normal conjunctiva   Neck: Supple    Cardiovascular: Normal S1 & S2, no m/r/g   Pulmonary/Chest Wall: Clear to auscultation bilaterally   Abdominal: Soft and non-tender   Musculoskeletal: no edema   Neurological: No focal deficits     Data Review:  Lab Results   Component Value Date/Time    Sodium 144 03/06/2018 04:24 AM    Potassium 3.5 03/06/2018 04:24 AM    Chloride 115 (H) 03/06/2018 04:24 AM    CO2 18 (L) 03/06/2018 04:24 AM    Anion gap 11 03/06/2018 04:24 AM    Glucose 100 (H) 03/06/2018 04:24 AM    BUN 50 (H) 03/06/2018 04:24 AM    Creatinine 3.56 (H) 03/06/2018 04:24 AM    BUN/Creatinine ratio 14 03/06/2018 04:24 AM    GFR est AA 23 (L) 03/06/2018 04:24 AM    GFR est non-AA 19 (L) 03/06/2018 04:24 AM    Calcium 7.4 (L) 03/06/2018 04:24 AM     Lab Results   Component Value Date/Time    WBC 7.6 03/06/2018 04:24 AM    HGB 8.8 (L) 03/06/2018 04:24 AM    HCT 25.7 (L) 03/06/2018 04:24 AM    PLATELET 881 87/31/2757 04:24 AM    MCV 82.4 03/06/2018 04:24 AM     Lab Results   Component Value Date/Time    Calcium 7.4 (L) 03/06/2018 04:24 AM    Phosphorus 4.4 03/06/2018 04:24 AM     Lab Results   Component Value Date/Time    Iron 56 03/04/2018 05:45 AM    TIBC 119 (L) 03/04/2018 05:45 AM    Iron % saturation 47 03/04/2018 05:45 AM    Ferritin 1043 (H) 03/02/2018 04:08 AM     Lab Results   Component Value Date/Time    Ferritin 1043 (H) 03/02/2018 04:08 AM         Impression:     1. Acute Kidney Injury on CKD 3/4, likely sec to CHF decompensation with decreased Renal perfusion vs progression of CKD. S Cr 3.56  today, increasing  2. CKD3/4 with proteinuria likely sec to Diabetic Nephropathy/Hypertensive Kidney disease. Previous work up at [facility] in 2017 noted. Serologies, SPEP, UPEP, largely unremarkable except for+ SALEEM at a low titer. 3. Systolic/Diastolic CHF decompensation. Most recent ECHO EF 40 to 45%. No Ischemia on Nuclear stress test  4. Anemia , likely sec to CKD. TSAT adequate  5. Metabolic Acidosis  6. Hypernatremia, mild  7. Hypoalbuminemia  8. DM  9. HTN  10. S/p R BKA    Plan:     1. Decreased IV diuretic Furosemide to 80mg daily  2. Continue oral  sodium Bicarbonate  3. EPO for Anemia  4. No indication for initiating HD presently, we will continue to monitor Renal function.     Krishan Jalloh MD, MPH [facility]  [phone]

## 2018-03-06 NOTE — PROGRESS NOTES
Problem: Falls - Risk of  Goal: *Absence of Falls  Document Vanessa Fall Risk and appropriate interventions in the flowsheet.    Outcome: Progressing Towards Goal  Fall Risk Interventions:  Mobility Interventions: Bed/chair exit alarm, Patient to call before getting OOB, PT Consult for mobility concerns, PT Consult for assist device competence         Medication Interventions: Bed/chair exit alarm, Patient to call before getting OOB, Teach patient to arise slowly    Elimination Interventions: Call light in reach, Toilet paper/wipes in reach, Toileting schedule/hourly rounds    History of Falls Interventions: Bed/chair exit alarm, Investigate reason for fall, Room close to nurse's station

## 2018-03-06 NOTE — PROGRESS NOTES
0730 Assumed care of pt from [address] [place] [address]. Pt resting quietly in bed , no signs of distress, call bell within reach. 0800 Assessment completed. Pt able to sit at the side of the bed for breakfast, denies chest pain or shortness of breath. States that he gets short of breath on exertion, however is \"feeling better\" , pt not on home o2, will try to slowly start weaning off o2 as tolerated    0927 Pt given prn Immodium, pt had 2 large, soft BM's, denies pain, has ordered Florastor, recommend pt order yogurt with meals  -pt believes that he received the flu vaccine at [facility]. Pt rested throughout shift. Denied chest pain or shortness of breath. Given PRN Imodium for soft stools, weaned O2 to 1L NC, unable to do walk test because Rt BKA was swollen and unable to fit in prosthetic sleeve.

## 2018-03-06 NOTE — PROGRESS NOTES
Cardiology Progress Note        Patient: Gayleen Jeans. Sex: male          DOA: 2/28/2018  YOB: 1970      Age:  41 y.o.        LOS:  LOS: 5 days   Assessment/Plan     Principal Problem:    Acute exacerbation of CHF (congestive heart failure) ([facility] (2/28/2018)    Active Problems:    Hyponatremia (2/28/2018)      Anemia of chronic disease (2/28/2018)      Acute on chronic renal insufficiency (2/28/2018)      Iron deficiency anemia (3/4/2018)      Other restrictive cardiomyopathy ([facility] [address] (3/4/2018)        Plan:  NSR  Stress test is normal  Cardiomyopathy EF 42% on stress test, non ischemic cardiomyopathy/Hypertensive heart disease  Continue with risk factors management including aggressive antihypertensive treatment. Discussed with patient. I will sign off and please call if any questions. Subjective:    cc:  No CP      REVIEW OF SYSTEMS: NO CP, SOB, N,V,D, F,C  Objective:      Visit Vitals    /86 (BP 1 Location: Left arm, BP Patient Position: At rest;Supine; Head of bed elevated (Comment degrees))    Pulse 88    Temp 97.3 °F (36.3 °C)    Resp 16    Ht 5' 7\" (1.702 m)    Wt 111.2 kg (245 lb 2.4 oz)    SpO2 100%    BMI 38.4 kg/m2     Body mass index is 38.4 kg/(m^2). Physical Exam:  General Appearance: Comfortable, not using accessory muscles of respiration. HEENT: VIVIAN. HEAD: Atraumatic  NECK: No JVD, no thyroidomeglay. CAROTIDS:  LUNGS: Clear bilaterally. HEART: S1+S2 audible    ABD: Non-tender, BS Audible    EXT: No edema, and no cysnosis. VASCULAR EXAM: Pulses are intact. PSYCHIATRIC EXAM: Mood is appropriate. MUSCULOSKELETAL: Grossly no joint deformity. NEUROLOGICAL: No motor and sensory deficit, Cranial nerves II-XII intact.   Medication:  Current Facility-Administered Medications   Medication Dose Route Frequency    0.9% sodium chloride infusion 250 mL  250 mL IntraVENous PRN    epoetin delonte (EPOGEN;PROCRIT) injection 6,000 Units  6,000 Units SubCUTAneous Q MON, WED & FRI    pantoprazole (PROTONIX) 40 mg in sodium chloride 10 mL injection  40 mg IntraVENous DAILY    hydrALAZINE (APRESOLINE) tablet 25 mg  25 mg Oral TID    sodium bicarbonate tablet 1,300 mg  1,300 mg Oral BID    loperamide (IMODIUM) capsule 2 mg  2 mg Oral TID PRN    Saccharomyces boulardii (FLORASTOR) capsule 500 mg  500 mg Oral BID    isosorbide mononitrate ER (IMDUR) tablet 60 mg  60 mg Oral DAILY    amLODIPine (NORVASC) tablet 10 mg  10 mg Oral DAILY    nystatin (MYCOSTATIN) 100,000 unit/gram powder   Topical BID    furosemide (LASIX) injection 80 mg  80 mg IntraVENous Q12H    acetaminophen (TYLENOL) tablet 650 mg  650 mg Oral Q4H PRN    carvedilol (COREG) tablet 25 mg  25 mg Oral BID WITH MEALS    acetaminophen (TYLENOL) tablet 650 mg  650 mg Oral Q6H PRN               Lab/Data Reviewed:  Procedures/imaging: see electronic medical records for all procedures/Xrays   and details which were not copied into this note but were reviewed prior to creation of Plan       All lab results for the last 24 hours reviewed.      Recent Labs      03/05/18 0429 03/04/18   0545  03/03/18   0648   WBC  5.5  5.8  6.2   HGB  7.4*  7.9*  9.4*   HCT  22.5*  23.8*  28.7*   PLT  210  234  234     Recent Labs      03/05/18   0429 03/04/18   0545  03/03/18   0648   NA  144  147*  145   K  3.6  3.7  3.6   CL  116*  118*  116*   CO2  19*  18*  18*   GLU  107*  93  94   BUN  48*  46*  42*   CREA  3.35*  3.47*  3.32*   CA  7.3*  7.3*  7.8*       Signed By: Karen Keating MD     March 5, 2018

## 2018-03-07 ENCOUNTER — HOME HEALTH ADMISSION (OUTPATIENT)
Dept: HOME HEALTH SERVICES | Facility: HOME HEALTH | Age: 48
End: 2018-03-07
Payer: SELF-PAY

## 2018-03-07 VITALS
RESPIRATION RATE: 18 BRPM | BODY MASS INDEX: 36.44 KG/M2 | HEART RATE: 83 BPM | DIASTOLIC BLOOD PRESSURE: 73 MMHG | SYSTOLIC BLOOD PRESSURE: 142 MMHG | OXYGEN SATURATION: 100 % | HEIGHT: 67 IN | TEMPERATURE: 97.4 F | WEIGHT: 232.14 LBS

## 2018-03-07 LAB
ALBUMIN SERPL-MCNC: 1.5 G/DL (ref 3.4–5)
ANION GAP SERPL CALC-SCNC: 10 MMOL/L (ref 3–18)
BUN SERPL-MCNC: 54 MG/DL (ref 7–18)
BUN/CREAT SERPL: 15 (ref 12–20)
CALCIUM SERPL-MCNC: 8 MG/DL (ref 8.5–10.1)
CHLORIDE SERPL-SCNC: 115 MMOL/L (ref 100–108)
CO2 SERPL-SCNC: 19 MMOL/L (ref 21–32)
CREAT SERPL-MCNC: 3.54 MG/DL (ref 0.6–1.3)
ERYTHROCYTE [DISTWIDTH] IN BLOOD BY AUTOMATED COUNT: 15.5 % (ref 11.6–14.5)
GLUCOSE SERPL-MCNC: 106 MG/DL (ref 74–99)
HCT VFR BLD AUTO: 34.4 % (ref 36–48)
HGB BLD-MCNC: 8.5 G/DL (ref 13–16)
MCH RBC QN AUTO: 26.9 PG (ref 24–34)
MCHC RBC AUTO-ENTMCNC: 24.7 G/DL (ref 31–37)
MCV RBC AUTO: 108.9 FL (ref 74–97)
PHOSPHATE SERPL-MCNC: 4.8 MG/DL (ref 2.5–4.9)
PLATELET # BLD AUTO: 244 K/UL (ref 135–420)
PMV BLD AUTO: 13.2 FL (ref 9.2–11.8)
POTASSIUM SERPL-SCNC: 3.4 MMOL/L (ref 3.5–5.5)
RBC # BLD AUTO: 3.16 M/UL (ref 4.7–5.5)
SODIUM SERPL-SCNC: 144 MMOL/L (ref 136–145)
WBC # BLD AUTO: 6.3 K/UL (ref 4.6–13.2)

## 2018-03-07 PROCEDURE — 36415 COLL VENOUS BLD VENIPUNCTURE: CPT | Performed by: INTERNAL MEDICINE

## 2018-03-07 PROCEDURE — 74011250637 HC RX REV CODE- 250/637: Performed by: INTERNAL MEDICINE

## 2018-03-07 PROCEDURE — 85027 COMPLETE CBC AUTOMATED: CPT | Performed by: INTERNAL MEDICINE

## 2018-03-07 PROCEDURE — 74011250636 HC RX REV CODE- 250/636: Performed by: FAMILY MEDICINE

## 2018-03-07 PROCEDURE — 77010033678 HC OXYGEN DAILY

## 2018-03-07 PROCEDURE — C9113 INJ PANTOPRAZOLE SODIUM, VIA: HCPCS | Performed by: FAMILY MEDICINE

## 2018-03-07 PROCEDURE — 74011250636 HC RX REV CODE- 250/636: Performed by: INTERNAL MEDICINE

## 2018-03-07 PROCEDURE — 97535 SELF CARE MNGMENT TRAINING: CPT

## 2018-03-07 PROCEDURE — 74011250637 HC RX REV CODE- 250/637: Performed by: FAMILY MEDICINE

## 2018-03-07 PROCEDURE — 97530 THERAPEUTIC ACTIVITIES: CPT

## 2018-03-07 PROCEDURE — 74011250637 HC RX REV CODE- 250/637: Performed by: HOSPITALIST

## 2018-03-07 PROCEDURE — 80069 RENAL FUNCTION PANEL: CPT | Performed by: INTERNAL MEDICINE

## 2018-03-07 PROCEDURE — 97116 GAIT TRAINING THERAPY: CPT

## 2018-03-07 RX ORDER — POTASSIUM CHLORIDE 20 MEQ/1
40 TABLET, EXTENDED RELEASE ORAL
Status: COMPLETED | OUTPATIENT
Start: 2018-03-07 | End: 2018-03-07

## 2018-03-07 RX ORDER — CARVEDILOL 25 MG/1
25 TABLET ORAL 2 TIMES DAILY WITH MEALS
Qty: 60 TAB | Refills: 0 | Status: SHIPPED | OUTPATIENT
Start: 2018-03-07 | End: 2022-06-07

## 2018-03-07 RX ORDER — AMLODIPINE BESYLATE 10 MG/1
10 TABLET ORAL DAILY
Qty: 30 TAB | Refills: 0 | Status: SHIPPED | OUTPATIENT
Start: 2018-03-08 | End: 2022-06-07

## 2018-03-07 RX ORDER — HYDRALAZINE HYDROCHLORIDE 25 MG/1
25 TABLET, FILM COATED ORAL 3 TIMES DAILY
Qty: 90 TAB | Refills: 0 | Status: SHIPPED | OUTPATIENT
Start: 2018-03-07 | End: 2022-06-07

## 2018-03-07 RX ORDER — ISOSORBIDE MONONITRATE 60 MG/1
60 TABLET, EXTENDED RELEASE ORAL DAILY
Qty: 30 TAB | Refills: 0 | Status: ON HOLD | OUTPATIENT
Start: 2018-03-08 | End: 2022-06-07 | Stop reason: SDUPTHER

## 2018-03-07 RX ORDER — FUROSEMIDE 80 MG/1
80 TABLET ORAL 2 TIMES DAILY
Qty: 60 TAB | Refills: 0 | Status: ON HOLD | OUTPATIENT
Start: 2018-03-07 | End: 2022-06-07 | Stop reason: SDUPTHER

## 2018-03-07 RX ORDER — SAME BUTANEDISULFONATE/BETAINE 400-600 MG
500 POWDER IN PACKET (EA) ORAL 2 TIMES DAILY
Qty: 28 CAP | Refills: 0 | Status: SHIPPED | OUTPATIENT
Start: 2018-03-07 | End: 2018-03-14

## 2018-03-07 RX ORDER — LOPERAMIDE HYDROCHLORIDE 2 MG/1
2 CAPSULE ORAL
Qty: 10 CAP | Refills: 0 | Status: SHIPPED | OUTPATIENT
Start: 2018-03-07 | End: 2018-03-17

## 2018-03-07 RX ADMIN — SODIUM BICARBONATE 650 MG TABLET 1300 MG: at 09:14

## 2018-03-07 RX ADMIN — NYSTATIN: 100000 POWDER TOPICAL at 09:00

## 2018-03-07 RX ADMIN — POTASSIUM CHLORIDE 40 MEQ: 20 TABLET, EXTENDED RELEASE ORAL at 12:24

## 2018-03-07 RX ADMIN — Medication 500 MG: at 09:13

## 2018-03-07 RX ADMIN — CARVEDILOL 25 MG: 12.5 TABLET, FILM COATED ORAL at 09:13

## 2018-03-07 RX ADMIN — HYDRALAZINE HYDROCHLORIDE 25 MG: 25 TABLET, FILM COATED ORAL at 16:54

## 2018-03-07 RX ADMIN — FUROSEMIDE 80 MG: 10 INJECTION, SOLUTION INTRAMUSCULAR; INTRAVENOUS at 09:14

## 2018-03-07 RX ADMIN — AMLODIPINE BESYLATE 10 MG: 5 TABLET ORAL at 09:14

## 2018-03-07 RX ADMIN — ISOSORBIDE MONONITRATE 60 MG: 60 TABLET, EXTENDED RELEASE ORAL at 09:15

## 2018-03-07 RX ADMIN — LOPERAMIDE HYDROCHLORIDE 2 MG: 2 CAPSULE ORAL at 09:14

## 2018-03-07 RX ADMIN — HYDRALAZINE HYDROCHLORIDE 25 MG: 25 TABLET, FILM COATED ORAL at 09:14

## 2018-03-07 RX ADMIN — CARVEDILOL 25 MG: 12.5 TABLET, FILM COATED ORAL at 16:54

## 2018-03-07 RX ADMIN — PANTOPRAZOLE SODIUM 40 MG: 40 INJECTION, POWDER, FOR SOLUTION INTRAVENOUS at 09:14

## 2018-03-07 NOTE — ROUTINE PROCESS
Bedside and Verbal shift change report given to Oziel Zambrano RN (oncoming nurse) by Verta Kehr RN (offgoing nurse). Report included the following information SBAR, Kardex, ED Summary, Procedure Summary, Intake/Output, MAR, Accordion, Recent Results and Med Rec Status.

## 2018-03-07 NOTE — DISCHARGE INSTRUCTIONS
DISCHARGE SUMMARY from Nurse    PATIENT INSTRUCTIONS:    Recommended activity: Activity as tolerated. *  Please give a list of your current medications to your Primary Care Provider. *  Please update this list whenever your medications are discontinued, doses are      changed, or new medications (including over-the-counter products) are added. *  Please carry medication information at all times in case of emergency situations. These are general instructions for a healthy lifestyle:    No smoking/ No tobacco products/ Avoid exposure to second hand smoke  Surgeon General's Warning:  Quitting smoking now greatly reduces serious risk to your health. Obesity, smoking, and sedentary lifestyle greatly increases your risk for illness    A healthy diet, regular physical exercise & weight monitoring are important for maintaining a healthy lifestyle    You may be retaining fluid if you have a history of heart failure or if you experience any of the following symptoms:  Weight gain of 3 pounds or more overnight or 5 pounds in a week, increased swelling in our hands or feet or shortness of breath while lying flat in bed. Please call your doctor as soon as you notice any of these symptoms; do not wait until your next office visit. Recognize signs and symptoms of STROKE:    F-face looks uneven    A-arms unable to move or move unevenly    S-speech slurred or non-existent    T-time-call 911 as soon as signs and symptoms begin-DO NOT go       Back to bed or wait to see if you get better-TIME IS BRAIN. Warning Signs of HEART ATTACK     Call 911 if you have these symptoms:   Chest discomfort. Most heart attacks involve discomfort in the center of the chest that lasts more than a few minutes, or that goes away and comes back. It can feel like uncomfortable pressure, squeezing, fullness, or pain.  Discomfort in other areas of the upper body.  Symptoms can include pain or discomfort in one or both arms, the back, neck, jaw, or stomach.  Shortness of breath with or without chest discomfort.  Other signs may include breaking out in a cold sweat, nausea, or lightheadedness. Don't wait more than five minutes to call 911 - MINUTES MATTER! Fast action can save your life. Calling 911 is almost always the fastest way to get lifesaving treatment. Emergency Medical Services staff can begin treatment when they arrive -- up to an hour sooner than if someone gets to the hospital by car. The discharge information has been reviewed with the patient. The patient verbalized understanding. Discharge medications reviewed with the patient and appropriate educational materials and side effects teaching were provided. ___________________________________________________________________________________________________________________________________     Heart Failure: Care Instructions  Your Care Instructions    Heart failure occurs when your heart does not pump as much blood as the body needs. Failure does not mean that the heart has stopped pumping but rather that it is not pumping as well as it should. Over time, this causes fluid buildup in your lungs and other parts of your body. Fluid buildup can cause shortness of breath, fatigue, swollen ankles, and other problems. By taking medicines regularly, reducing sodium (salt) in your diet, checking your weight every day, and making lifestyle changes, you can feel better and live longer. Follow-up care is a key part of your treatment and safety. Be sure to make and go to all appointments, and call your doctor if you are having problems. It's also a good idea to know your test results and keep a list of the medicines you take. How can you care for yourself at home? Medicines  ? · Be safe with medicines. Take your medicines exactly as prescribed. Call your doctor if you think you are having a problem with your medicine.    ? · Do not take any vitamins, over-the-counter medicine, or herbal products without talking to your doctor first. Nirali Gaviria not take ibuprofen (Advil or Motrin) and naproxen (Aleve) without talking to your doctor first. They could make your heart failure worse. ? · You may be taking some of the following medicine. ¨ Beta-blockers can slow heart rate, decrease blood pressure, and improve your condition. Taking a beta-blocker may lower your chance of needing to be hospitalized. ¨ Angiotensin-converting enzyme inhibitors (ACEIs) reduce the heart's workload, lower blood pressure, and reduce swelling. Taking an ACEI may lower your chance of needing to be hospitalized again. ¨ Angiotensin II receptor blockers (ARBs) work like ACEIs. Your doctor may prescribe them instead of ACEIs. ¨ Diuretics, also called water pills, reduce swelling. ¨ Potassium supplements replace this important mineral, which is sometimes lost with diuretics. ¨ Aspirin and other blood thinners prevent blood clots, which can cause a stroke or heart attack. ? You will get more details on the specific medicines your doctor prescribes. Diet  ? · Your doctor may suggest that you limit sodium to 2,000 milligrams (mg) a day or less. That is less than 1 teaspoon of salt a day, including all the salt you eat in cooking or in packaged foods. People get most of their sodium from processed foods. Fast food and restaurant meals also tend to be very high in sodium. ? · Ask your doctor how much liquid you can drink each day. You may have to limit liquids. ?Weight  ? · Weigh yourself without clothing at the same time each day. Record your weight. Call your doctor if you have a sudden weight gain, such as more than 2 to 3 pounds in a day or 5 pounds in a week. (Your doctor may suggest a different range of weight gain.) A sudden weight gain may mean that your heart failure is getting worse. ? Activity level  ? · Start light exercise (if your doctor says it is okay).  Even if you can only do a small amount, exercise will help you get stronger, have more energy, and manage your weight and your stress. Walking is an easy way to get exercise. Start out by walking a little more than you did before. Bit by bit, increase the amount you walk. ? · When you exercise, watch for signs that your heart is working too hard. You are pushing yourself too hard if you cannot talk while you are exercising. If you become short of breath or dizzy or have chest pain, stop, sit down, and rest.   ? · If you feel \"wiped out\" the day after you exercise, walk slower or for a shorter distance until you can work up to a better pace. ? · Get enough rest at night. Sleeping with 1 or 2 pillows under your upper body and head may help you breathe easier. ? Lifestyle changes  ? · Do not smoke. Smoking can make a heart condition worse. If you need help quitting, talk to your doctor about stop-smoking programs and medicines. These can increase your chances of quitting for good. Quitting smoking may be the most important step you can take to protect your heart. ? · Limit alcohol to 2 drinks a day for men and 1 drink a day for women. Too much alcohol can cause health problems. ? · Avoid getting sick from colds and the flu. Get a pneumococcal vaccine shot. If you have had one before, ask your doctor whether you need another dose. Get a flu shot each year. If you must be around people with colds or the flu, wash your hands often. When should you call for help? Call 911 if you have symptoms of sudden heart failure such as:  ? · You have severe trouble breathing. ? · You cough up pink, foamy mucus. ? · You have a new irregular or rapid heartbeat. ?Call your doctor now or seek immediate medical care if:  ? · You have new or increased shortness of breath. ? · You are dizzy or lightheaded, or you feel like you may faint. ? · You have sudden weight gain, such as more than 2 to 3 pounds in a day or 5 pounds in a week.  (Your doctor may suggest a different range of weight gain.)   ? · You have increased swelling in your legs, ankles, or feet. ? · You are suddenly so tired or weak that you cannot do your usual activities. ? Watch closely for changes in your health, and be sure to contact your doctor if you develop new symptoms. Where can you learn more? Go to http://josé luis-anh.info/. Enter V581 in the search box to learn more about \"Heart Failure: Care Instructions. \"  Current as of: September 21, 2016  Content Version: 11.4  © 8950-4063 [employer]. Care instructions adapted under license by [employer] (which disclaims liability or warranty for this information). If you have questions about a medical condition or this instruction, always ask your healthcare professional. [address] any warranty or liability for your use of this information. Chronic Kidney Disease: Care Instructions  Your Care Instructions    Chronic kidney disease happens when your kidneys don't work as well as they should. Your kidneys have a few important jobs. They remove waste from your blood. This waste leaves your body in your urine. They also balance your body's fluids and chemicals. When your kidneys don't work well, extra waste and fluid can build up. This can poison the body and sometimes cause death. The most common causes of this disease are diabetes and high blood pressure. In some cases, the disease develops in 2 to 3 months. But it usually develops over many years. If you take medicine and make healthy changes to your lifestyle, you may be able to prevent the disease from getting worse. But if your kidney damage gets worse, you may need dialysis or a kidney transplant. Dialysis uses a machine to filter waste from the blood. A transplant is surgery to give you a healthy kidney from another person. Follow-up care is a key part of your treatment and safety.  Be sure to make and go to all appointments, and call your doctor if you are having problems. It's also a good idea to know your test results and keep a list of the medicines you take. How can you care for yourself at home? ? Treatments and appointments  ? · Be safe with medicines. Take your medicines exactly as prescribed. Call your doctor if you have any problems with your medicine. You also may take medicine to control your blood pressure or to treat diabetes. Many people who have diabetes take blood pressure medicine. ? · If you have diabetes, do your best to keep your blood sugar in your target range. You may do this by eating healthy food and exercising. You may also take medicines. ? · Go to your dialysis appointments if you have this treatment. ? · Do not take ibuprofen (Advil, Motrin), naproxen (Aleve), or similar medicines, unless your doctor tells you to. These may make the disease worse. ? · Do not take any vitamins, over-the-counter medicines, or herbal products without talking to your doctor first.   ? · Do not smoke or use other tobacco products. Smoking can reduce blood flow to the kidneys. If you need help quitting, talk to your doctor about stop-smoking programs and medicines. These can increase your chances of quitting for good. ? · Do not drink alcohol or use illegal drugs. ? · Talk to your doctor about an exercise plan. Exercise helps lower your blood pressure. It also makes you feel better. ? · If you have an advance directive, let your doctor know. It may include a living will and a durable power of  for health care. If you don't have one, you may want to prepare one. It lets your doctor and loved ones know your health care wishes if you become unable to speak for yourself. Diet  ? · Talk to a registered dietitian. He or she can help you make a meal plan that is right for you. Most people with kidney disease need to limit salt (sodium), fluids, and protein. Some also have to limit potassium and phosphorus.    ? · You may have to give up many foods you like. But try to focus on the fact that this will help you stay healthy for as long as possible. ? · If you have a hard time eating enough, talk to your doctor or dietitian about ways to add calories to your diet. ? · Your diet may change as your disease changes. See your doctor for regular testing. And work with a dietitian to change your diet as needed. When should you call for help? Call 911 anytime you think you may need emergency care. For example, call if:  ? · You passed out (lost consciousness). ?Call your doctor now or seek immediate medical care if:  ? · You have less urine than normal or no urine. ? · You have trouble urinating or can urinate only very small amounts. ? · You are confused or have trouble thinking clearly. ? · You feel weaker or more tired than usual.   ? · You are very thirsty, lightheaded, or dizzy. ? · You have nausea and vomiting. ? · You have new swelling of your arms or feet, or your swelling is worse. ? · You have blood in your urine. ? · You have new or worse trouble breathing. ? Watch closely for changes in your health, and be sure to contact your doctor if:  ? · You have any problems with your medicine or other treatment. Where can you learn more? Go to http://josé luis-anh.info/. Enter N276 in the search box to learn more about \"Chronic Kidney Disease: Care Instructions. \"  Current as of: May 12, 2017  Content Version: 11.4  © 7887-9250 [employer]. Care instructions adapted under license by [employer] (which disclaims liability or warranty for this information). If you have questions about a medical condition or this instruction, always ask your healthcare professional. [address] any warranty or liability for your use of this information.

## 2018-03-07 NOTE — DISCHARGE SUMMARY
Discharge Summary    Patient: Meredith Rodríguez MRN: [id number]  CSN: [id number]    YOB: 1970  Age: 41 y.o. Sex: male    DOA: 2/28/2018 LOS:  LOS: 7 days   Discharge Date:      Primary Care Provider:  Jacques Gottron, MD    Admission Diagnoses: Acute exacerbation of CHF (congestive heart failure) ([facility]  Acute on chronic renal insufficiency  Hyponatremia  Anemia of chronic disease    Discharge Diagnoses:    Hospital Problems  Date Reviewed: 3/4/2018          Codes Class Noted POA    Iron deficiency anemia ICD-10-CM: D50.9  ICD-9-CM: 280.9  3/4/2018 Unknown        Other restrictive cardiomyopathy ([facility] ICD-10-CM: I42.5  ICD-9-CM: 425.4  3/4/2018 Unknown        Hyponatremia ICD-10-CM: E87.1  ICD-9-CM: 276.1  2/28/2018 Unknown        Anemia of chronic disease ICD-10-CM: D63.8  ICD-9-CM: 285.29  2/28/2018 Unknown        Acute on chronic renal insufficiency ICD-10-CM: N28.9, N18.9  ICD-9-CM: 593.9, 585.9  2/28/2018 Unknown        * (Principal)Acute exacerbation of CHF (congestive heart failure) ([facility] ICD-10-CM: I50.9  ICD-9-CM: 428.0  2/28/2018 Unknown              Discharge Condition: Stable    Discharge Medications:     Current Discharge Medication List      START taking these medications    Details   amLODIPine (NORVASC) 10 mg tablet Take 1 Tab by mouth daily. Qty: 30 Tab, Refills: 0      carvedilol (COREG) 25 mg tablet Take 1 Tab by mouth two (2) times daily (with meals). Qty: 60 Tab, Refills: 0      hydrALAZINE (APRESOLINE) 25 mg tablet Take 1 Tab by mouth three (3) times daily. Qty: 90 Tab, Refills: 0      isosorbide mononitrate ER (IMDUR) 60 mg CR tablet Take 1 Tab by mouth daily. Qty: 30 Tab, Refills: 0      loperamide (IMODIUM) 2 mg capsule Take 1 Cap by mouth three (3) times daily as needed for Diarrhea for up to 10 days. Indications: Diarrhea  Qty: 10 Cap, Refills: 0      Saccharomyces boulardii (FLORASTOR) 250 mg capsule Take 2 Caps by mouth two (2) times a day for 7 days.   Qty: 28 Cap, Refills: 0         CONTINUE these medications which have CHANGED    Details   furosemide (LASIX) 80 mg tablet Take 1 Tab by mouth two (2) times a day. Qty: 60 Tab, Refills: 0             Procedures : none     Consults: Cardiology and Nephrology      PHYSICAL EXAM   Visit Vitals    /78 (BP 1 Location: Left arm, BP Patient Position: At rest)    Pulse 88    Temp 98.5 °F (36.9 °C)    Resp 16    Ht 5' 7\" (1.702 m)    Wt 105.3 kg (232 lb 2.3 oz)    SpO2 99%    BMI 36.36 kg/m2     General: Awake, cooperative, no acute distress    HEENT: NC, Atraumatic. PERRLA, EOMI. Anicteric sclerae. Lungs:  CTA Bilaterally. No Wheezing/Rhonchi/Rales. Heart:  Regular  rhythm,  +murmur, No Rubs, No Gallops  Abdomen: Soft, Non distended, Non tender. +Bowel sounds,   Extremities: No c/c, rt BKA noted   Psych:   Not anxious or agitated. Neurologic:  No acute neurological deficits. Admission HPI :   Pita Facundo. is a 41 y.o. male with a pmhx of chf, ckd, htn, and chronic anemia who presents with worsening lower extremity edema and dyspnea. Has been evolving over the past couple days. No associated chest pain. It appears he has taken his lasix sporadically. Report admission for similar problem in November 2017 at [address] [place] [address] noted to [place] [address]. CXR with interstitial edema and pleural effusion. Ths far in the ER he has received carvedilol and 80 mg of intravenous furosemide    Hospital Course : For his restrictive cardiomyopathy and chf, cardiology was on board. Lasix was given for chf exacerbation. stress test was performed and results were normal. Renal on board for his hayley on ckd3-4. Lasix was recommended. He also received blood transfusion for his anemia. epogen was given. His diarrhea got improving per probiotics and imodium . Antihypertensive medication was optimized and bp was well controlled.  He was cleared to be d/c per both renal and cardiology. He received pt/ot. He would like to go home instead of going to rehab.         Activity: Activity as tolerated    Diet: Cardiac Diet    Follow-up: pcm and cardiology and nephrology     Disposition: home with home health     Minutes spent on discharge: 50 min       Labs: Results:       Chemistry Recent Labs      03/07/18   0201 03/06/18 0424 03/05/18 0429   GLU  106*  100*  107*   NA  144  144  144   K  3.4*  3.5  3.6   CL  115*  115*  116*   CO2  19*  18*  19*   BUN  54*  50*  48*   CREA  3.54*  3.56*  3.35*   CA  8.0*  7.4*  7.3*   AGAP  10  11  9   BUCR  15  14  14   ALB  1.5*  1.6*   --       CBC w/Diff Recent Labs      03/07/18   0201 03/06/18 0424 03/05/18 0429   WBC  6.3  7.6  5.5   RBC  3.16*  3.12*  2.77*   HGB  8.5*  8.8*  7.4*   HCT  34.4*  25.7*  22.5*   PLT  244  218  210      Cardiac Enzymes No results for input(s): CPK, CKND1, WILLIE in the last 72 hours. No lab exists for component: CKRMB, TROIP   Coagulation No results for input(s): PTP, INR, APTT in the last 72 hours. No lab exists for component: INREXT    Lipid Panel No results found for: CHOL, CHOLPOCT, CHOLX, CHLST, CHOLV, 153422, HDL, LDL, LDLC, DLDLP, 169192, VLDLC, VLDL, TGLX, TRIGL, TRIGP, TGLPOCT, CHHD, CHHDX   BNP No results for input(s): BNPP in the last 72 hours. Liver Enzymes Recent Labs      03/07/18 0201   ALB  1.5*      Thyroid Studies No results found for: T4, T3U, TSH, TSHEXT         Significant Diagnostic Studies: Ct Head Wo Cont    Result Date: 3/2/2018  CT Head History: Visual disturbances Comparison: No prior study Technique: Multiple axial CT images of the head were obtained extending from the skull base to the vertex. Additional coronal and sagittal reformations were performed. One or more dose reduction techniques were used on this CT: automated exposure control, adjustment of the mAs and/or kVp according to patient's size, and iterative reconstruction techniques.  The specific techniques utilized on this CT exam have been documented in the patient's electronic medical record. Findings: The ventricles and sulci are normal in their size and configuration. There is no evidence of acute intracranial hemorrhage, mass effect, midline shift, or herniation. No definite CT evidence of acute cortical infarct is seen. The gray-white matter differentiation is within normal limits. The visualized orbits are unremarkable. The visualized paranasal sinuses and mastoid air cells are clear. No fracture is seen. IMPRESSION: No acute intracranial hemorrhage, mass effect, midline shift, or herniation. No definite CT evidence of acute cortical infarct is seen. Please note that noncontrast head CT may be normal in early acute infarct. Us Retroperitoneum Comp    Result Date: 3/1/2018  Retroperitoneal Ultrasound History: Acute kidney injury superimposed upon chronic renal failure. Comparison: No prior study available for comparison. Technique: Multiple gray scale sonographic images of the kidneys and bladder were obtained. Additional color Doppler and the Doppler waveform images were obtained . Findings: The right kidney measures 12.6 cm in length with increased parenchymal echotexture. No focal lesions are seen. There is no evidence of hydronephrosis. The left kidney measures 11.6 cm in length with increased parenchymal echotexture. No focal lesions are seen. There is no evidence of hydronephrosis. The bladder is unremarkable. The estimated volume of the bladder pre-void is 386.6 mL. A postvoid residual could not be calculated as the patient did not void. Bilateral ureteric jets are visualized. Small left pleural effusion demonstrated. IMPRESSION: 1. No evidence of hydronephrosis involving either kidney. 2. Increased renal parenchymal echogenicity, in keeping with sequela of medical renal disease. 3. Small left pleural effusion.      Xr Chest Port    Result Date: 2/28/2018  EXAM: Portable chest INDICATION: CHF COMPARISON: None. _______________ FINDINGS: AP portable chest film was performed. Suboptimal inspiration. Heart is enlarged. There is central vascular congestion. Mild blunting left costophrenic angle. Mildly increased basilar interstitial markings. No definite focal consolidation. No pneumothorax. Healed right-sided rib fracture. _______________     IMPRESSION: Cardiomegaly with possible mild interstitial edema and small left effusion. Nm Cardiac Spect W Strs/rest Mult    Result Date: 3/5/2018  Indication. Acute CHF and cardiomyopathy. Procedure. Rest and stress single day single isotope pharmacological Lexiscan nuclear stress test. Protocol. The resting EKG have shown NSR with nonspecific ST and T changes. The patient was given Regadenoson 0.4 mg intravenously. Patient fell mild flushing which is resolved by its own. There are no ischemic EKG changes with pharmacological stress part. No significant arrhythmia during the stress part. Overall normal response to pharmacological Lexiscan nuclear part. Technique. At rest the patient was given 12.0 mCi of Cardiolite. At peak pharmacological stress the patient was given 36.0 mCi of Cardiolite. Gated post stress tomographic imaging was performed. Reconstructed SPECT images were reviewed in the short axis, vertical long axis and horizontal long axis planes. Gated SPECT images were reviewed in the cine format. Findings. The study is compromised with motion artifact, diaphragmatic attenuation artifact. TID is 0.96, which is a normal value. Perfusion analysis. Attenuation corrected and nonattenuation corrected images were reviewed and compared. On nonattenuation corrected images there is a degreed uptake of radioisotope on inferolateral wall with minimal reversibility on rest nonattenuation corrected images.   There is a normal uptake and distribution of radioisotope throughout the myocardium during stress and rest imaging on attenuation corrected images. No evidence of any ischemia or infarction on attenuation corrected images. Functional analysis. The left ventricular cavity size is normal with mildly reduced left ventricular ejection fraction at 42% without any regional wall motion abnormalities. Conclusion. 1.  This is a normal pharmacological Lexiscan nuclear stress test. 2.  No evidence of any ischemia or infarction. 3.  No ischemic EKG changes with pharmacological stress part. 4.  Patient have no arrhythmia during the stress part. 5.  The left ventricular ejection fraction is mildly reduced at 42% without any regional wall motion abnormality and with mild global hypokinesis.              [facility]     CC: Ernestina Mcghee MD

## 2018-03-07 NOTE — PROGRESS NOTES
Case discussed with Dr. Baltazar Morgan, he recommended to d/c with lasix  80 mg twice and f/u with nephrology in one week.  His group will call him for appointment

## 2018-03-07 NOTE — PROGRESS NOTES
Problem: Falls - Risk of  Goal: *Absence of Falls  Document Vanessa Fall Risk and appropriate interventions in the flowsheet.    Outcome: Progressing Towards Goal  Fall Risk Interventions:  Mobility Interventions: Bed/chair exit alarm, Communicate number of staff needed for ambulation/transfer, Patient to call before getting OOB, Utilize walker, cane, or other assitive device         Medication Interventions: Bed/chair exit alarm, Patient to call before getting OOB    Elimination Interventions: Bed/chair exit alarm, Call light in reach, Patient to call for help with toileting needs, Toileting schedule/hourly rounds, Urinal in reach    History of Falls Interventions: Bed/chair exit alarm, Door open when patient unattended, Room close to nurse's station

## 2018-03-07 NOTE — PROGRESS NOTES
26 Pt received from offgoing nurse without any signs or symptoms of distress. Pt vitals are stable and within normal limits. Pt bed in low position with wheels locked and call bell within reach. Pt waiting for transport home. Pt states brother will pick him up after work at [employer]. Pt discharge completed per discharge nurse. 1730 Pt sitting on side of bed with NAD noted. Visitors present at this time. Visitors brought pt wheelchair and walker for him to take home with him. 1900 Bedside and Verbal shift change report given to Gabriel Bond (oncoming nurse) by Leighton Woodruff RN (offgoing nurse). Report given with SBAR, Procedure Summary, Intake/Output, MAR and Recent Results.

## 2018-03-07 NOTE — PROGRESS NOTES
Pt will need to work with PT with his prosthetic and then he will likely dc in 24-48hrs. Pt refusing rehab. Per previous cm notes and foc, pt will go home with [facility]. CM will cont to follow.

## 2018-03-07 NOTE — PROGRESS NOTES
Problem: Mobility Impaired (Adult and Pediatric)  Goal: *Acute Goals and Plan of Care (Insert Text)  Physical Therapy Goals LT/ST  Initiated 3/1/2018 and to be accomplished within 5-7 day(s)  1. Patient will move from supine <> sit with S in prep for out of bed activity and change of position. 2.  Patient will perform sit<> stand with CGA/S with LRAD in prep for transfers/ambulation. 3.  Patient will transfer from bed <> chair with CGA/S with LRAD for time up in chair for completion of ADL activity. 4.  Patient will ambulate >50 feet with LRAD/Min-CGA for improved functional mobility/safe discharge. .      Outcome: Not Progressing Towards Goal  physical Therapy TREATMENT    Patient: Fan Ramos. (40 y.o. male)  Date: 3/7/2018  Diagnosis: Acute exacerbation of CHF (congestive heart failure) (HCC)  Acute on chronic renal insufficiency  Hyponatremia  Anemia of chronic disease Acute exacerbation of CHF (congestive heart failure) ([facility] [address]       Precautions: Fall, Skin   Chart, physical therapy assessment, plan of care and goals were reviewed. ASSESSMENT:  Pt is able to [employer] Left LE prosthetic, although it is still tight and unable to fully loock in. Pt's transfer EOB, requires additional time and cuing. Standing requires 25\" seat height and moderate assist to stand. Short distance ambulation exhausts pt and he notes that he needs more time time before returning to home, where he he must care for himself during the day. Hospitalist, Cm, RN and Pt are aware of recommendation. Progression toward goals:  []      Improving appropriately and progressing toward goals  [x]      Improving slowly and progressing toward goals  []      Not making progress toward goals and plan of care will be adjusted     PLAN:  Patient continues to benefit from skilled intervention to address the above impairments. Continue treatment per established plan of care.   Discharge Recommendations:  Saul Arellano Equipment Recommendations for Discharge:  bedside commode and rolling walker     SUBJECTIVE:   Patient stated I want to go home, just not today.     OBJECTIVE DATA SUMMARY:   Critical Behavior:  Neurologic State: Alert  Orientation Level: Oriented X4  Cognition: Follows commands, Decreased attention/concentration, Poor safety awareness  Safety/Judgement: Decreased awareness of need for assistance, Decreased awareness of need for safety, Decreased insight into deficits  Functional Mobility Training:  Bed Mobility:  Rolling: Supervision; Additional time  Supine to Sit: Supervision; Additional time  Scooting: Supervision; Additional time  Transfers:  Sit to Stand: Moderate assistance; Additional time  Stand to Sit: Moderate assistance; Additional time  Balance:  Sitting: Intact  Standing: Impaired; With support  Standing - Static: Fair  Standing - Dynamic : Fair  Ambulation/Gait Training:  Distance (ft): 30 Feet (ft)  Assistive Device: Walker, rolling;Gait belt  Gait Abnormalities: Circumduction  Base of Support: Widened  Step Length: Right shortened;Left shortened  Pain:  Pain Scale 1: Numeric (0 - 10)  Pain Intensity 1: 0  Pain out: 0  Activity Tolerance:   Fair  Please refer to the flowsheet for vital signs taken during this treatment.   After treatment:   [] Patient left in no apparent distress sitting up in chair  [x] Patient left in no apparent distress in bed  [x] Call bell left within reach  [x] Nursing notified  [] Caregiver present  [] Bed alarm activated      Emmie Matta PTA   Time Calculation: 33 mins

## 2018-03-07 NOTE — PROGRESS NOTES
Nephrology Progress note    Subjective:     Gautam Oh Jr. is a 41 y. o. male with a past medical history significant for DM, HTN, Sickle Cell trait, diastolic CHF, s/p R BKA, CKD who presented with c/o worsening SOB ~ 2 weeks. Initial evaluation consistent with CHF decompensation. Labs revealed a S Cr of 3.0, S. Alb 1.8 and Anemia Hgb 8. 3.  Patient admitted and given IV diuretic Furosemide. Review of records from recent hospitalization at [facility] in Nov 2017 reveals a S Cr in range of 2.5 to 2.8. Serologies including Hep B and C,as well as HIV were neg., Complements C3 and C4 were normal. Serum and Urine Protein Electrophoresis did not reveal monoclonal spike. A 24 hr Urine Protein was 2.2 gms. PTH was normal.  ECHO showed EF 60%. Back in Aug 2017,  S Cr was in the range of 1.8 to 2.2. SALEEM was pos at a titer of 1: 40 speckled pattern. Anti DS-DNA and Anti-Smith Ab were neg. SOB improving per patient. Had nuclear cardiac stress test on 3/5, No evidence of Ischemia. EF 42%. Pt has no cp/sob today and wants to go home.     Admit Date: 2/28/2018  Principal Problem:    Acute exacerbation of CHF (congestive heart failure) (HCC) (2/28/2018)    Active Problems:    Hyponatremia (2/28/2018)      Anemia of chronic disease (2/28/2018)      Acute on chronic renal insufficiency (2/28/2018)      Iron deficiency anemia (3/4/2018)      Other restrictive cardiomyopathy (HCC) (3/4/2018)      Current Facility-Administered Medications   Medication Dose Route Frequency    furosemide (LASIX) injection 80 mg  80 mg IntraVENous DAILY    0.9% sodium chloride infusion 250 mL  250 mL IntraVENous PRN    epoetin delonte (EPOGEN;PROCRIT) injection 6,000 Units  6,000 Units SubCUTAneous Q MON, WED & FRI    pantoprazole (PROTONIX) 40 mg in sodium chloride 10 mL injection  40 mg IntraVENous DAILY    hydrALAZINE (APRESOLINE) tablet 25 mg  25 mg Oral TID    sodium bicarbonate tablet 1,300 mg  1,300 mg Oral BID    loperamide (IMODIUM) capsule 2 mg  2 mg Oral TID PRN    Saccharomyces boulardii (FLORASTOR) capsule 500 mg  500 mg Oral BID    isosorbide mononitrate ER (IMDUR) tablet 60 mg  60 mg Oral DAILY    amLODIPine (NORVASC) tablet 10 mg  10 mg Oral DAILY    nystatin (MYCOSTATIN) 100,000 unit/gram powder   Topical BID    acetaminophen (TYLENOL) tablet 650 mg  650 mg Oral Q4H PRN    carvedilol (COREG) tablet 25 mg  25 mg Oral BID WITH MEALS    acetaminophen (TYLENOL) tablet 650 mg  650 mg Oral Q6H PRN         Allergy:   No Known Allergies     Objective:     Visit Vitals    /78 (BP 1 Location: Left arm, BP Patient Position: At rest)    Pulse 88    Temp 98.5 °F (36.9 °C)    Resp 16    Ht 5' 7\" (1.702 m)    Wt 105.3 kg (232 lb 2.3 oz)    SpO2 99%    BMI 36.36 kg/m2         Intake/Output Summary (Last 24 hours) at 03/07/18 1502  Last data filed at 03/07/18 1059   Gross per 24 hour   Intake              240 ml   Output             2235 ml   Net            -1995 ml       Physical Exam:       General: No acute distress   HENT: Atraumatic and normocephalic   Eyes: Normal conjunctiva   Neck: Supple with no JVD    Cardiovascular: Normal S1 & S2, no m/r/g   Pulmonary/Chest Wall: Clear to auscultation bilaterally   Abdominal: Soft and non-tender   Musculoskeletal: no edema   Neurological: No focal deficits, AAOx4     Data Review:  Lab Results   Component Value Date/Time    Sodium 144 03/07/2018 02:01 AM    Potassium 3.4 (L) 03/07/2018 02:01 AM    Chloride 115 (H) 03/07/2018 02:01 AM    CO2 19 (L) 03/07/2018 02:01 AM    Anion gap 10 03/07/2018 02:01 AM    Glucose 106 (H) 03/07/2018 02:01 AM    BUN 54 (H) 03/07/2018 02:01 AM    Creatinine 3.54 (H) 03/07/2018 02:01 AM    BUN/Creatinine ratio 15 03/07/2018 02:01 AM    GFR est AA 23 (L) 03/07/2018 02:01 AM    GFR est non-AA 19 (L) 03/07/2018 02:01 AM    Calcium 8.0 (L) 03/07/2018 02:01 AM     Lab Results   Component Value Date/Time    WBC 6.3 03/07/2018 02:01 AM    HGB 8.5 (L) 03/07/2018 02:01 AM HCT 34.4 (L) 03/07/2018 02:01 AM    PLATELET 758 13/86/5388 02:01 AM    .9 (H) 03/07/2018 02:01 AM     Lab Results   Component Value Date/Time    Calcium 8.0 (L) 03/07/2018 02:01 AM    Phosphorus 4.8 03/07/2018 02:01 AM     Lab Results   Component Value Date/Time    Iron 56 03/04/2018 05:45 AM    TIBC 119 (L) 03/04/2018 05:45 AM    Iron % saturation 47 03/04/2018 05:45 AM    Ferritin 1043 (H) 03/02/2018 04:08 AM     Lab Results   Component Value Date/Time    Ferritin 1043 (H) 03/02/2018 04:08 AM         Impression:     1. Acute Kidney Injury on CKD 3/4, likely sec to CHF decompensation with decreased Renal perfusion vs progression of CKD. S Cr 3.5, unchanged today  2. CKD3/4 with proteinuria likely sec to Diabetic Nephropathy/Hypertensive Kidney disease. Previous work up at [facility] and [facility] in 2017 noted. Serologies, SPEP, UPEP, largely unremarkable except for+ SALEEM at a low titer. 3. Systolic/Diastolic CHF decompensation. Most recent ECHO EF 40 to 45%. No Ischemia on Nuclear stress test  4. Anemia , likely sec to CKD. TSAT adequate  5. Metabolic Acidosis  6. Hypernatremia, mild  7. Hypoalbuminemia  8. DM  9. HTN  10. S/p right BKA    Plan:     1. Change lasix to 80mg po bid  2. Continue oral  sodium Bicarbonate  3. EPO for Anemia  4. No indication for initiating HD presently, we will continue to monitor Renal function.   5.  If d/c, will arrange nephrology follow up in 1 week    MD Isaac Dhaliwal  [phone]

## 2018-03-07 NOTE — ROUTINE PROCESS
Dual AVS reviewed with Tyron Roche RN. All medications reviewed individually with patient. Opportunities for questions and concerns provided. Patient discharged via (mode of transport ie. Car, ambulance or air transport) car  Patient's arm band appropriately discarded.       Ride home coming at 7pm

## 2018-03-07 NOTE — PROGRESS NOTES
Problem: Self Care Deficits Care Plan (Adult)  Goal: *Acute Goals and Plan of Care (Insert Text)  Occupational Therapy Goals  Initiated 3/2/2018 within 7 day(s). 1.  Patient will perform upper body dressing with supervision/set-up   2. Patient will perform lower body dressing with minimal assistance/contact guard assist.  3.  Patient will perform toilet transfers with supervision/set-up. 4.  Patient will perform all aspects of toileting with supervision/set-up. 5.  Patient will participate in upper extremity therapeutic exercise/activities with supervision/set-up for 5 minutes. 6.  Patient will complete standing during functional activities with min assist for 5 minutes to increase activity tolerance. Outcome: Progressing Towards Goal  Occupational Therapy TREATMENT    Patient: Bishop Brennan. (40 y.o. male)  Date: 3/7/2018  Diagnosis: Acute exacerbation of CHF (congestive heart failure) (HCC)  Acute on chronic renal insufficiency  Hyponatremia  Anemia of chronic disease Acute exacerbation of CHF (congestive heart failure) ([facility] [address]       Precautions: Fall, Skin  Chart, occupational therapy assessment, plan of care, and goals were reviewed. ASSESSMENT:  Patient seen with PT for second set of skilled hands to maximize independence and safety. Patient seen with PT initially w/ OT's emphasis on toileting and grooming at the sink. Pt with improved mobility from elevated BSC and use of B armrests. Pt instructed on use of reacher for LE dressing as well as LH shoe horn and LH sponge. Pt able to alyse/doff sock of LLE utilizing ankle-over-knee technique d/t decrease in BLE edema. Instructed on Red Theraband HEP for UE strengthening to support sit/stand. Instructed on adaptive strategies, clothing modifications, Energy Conservation/Work Simplification Techniques to maximize safety in ADLs and mobility as pt being discharged home and will be alone during day while brother at work.   Progression toward goals:  [] Improving appropriately and progressing toward goals  [x]          Improving slowly and progressing toward goals  []          Not making progress toward goals and plan of care will be adjusted     PLAN:  Patient continues to benefit from skilled intervention to address the above impairments. Continue treatment per established plan of care. Discharge Recommendations:  Rehab  Further Equipment Recommendations for Discharge:  transfer bench and rolling walker     SUBJECTIVE:   Patient stated I think I'll be ok in a couple days (explained to pt role and purpose of Hospital setting and need for Rehab once medically stable)    OBJECTIVE DATA SUMMARY:   Cognitive/Behavioral Status:  Neurologic State: Alert  Orientation Level: Oriented X4  Cognition: Follows commands, Decreased attention/concentration, Poor safety awareness  Safety/Judgement: Decreased awareness of need for assistance, Decreased awareness of need for safety, Decreased insight into deficits    Functional Mobility and Transfers for ADLs:   Bed Mobility:   (see PT note)   Transfers: Toilet Transfer : Contact guard assistance    with BSC over toilet at elevated height w/ pt reporting to be the same height as his at home   Bathroom Mobility: Contact guard assistance     Balance:  Sitting: Intact  Standing: Intact; With support    ADL Intervention:  Grooming  Washing Hands: Contact guard assistance    Lower Body Dressing Assistance  Underpants: Contact guard assistance; Compensatory technique training (use of reacher)  Socks: Stand-by assistance  Position Performed: Seated edge of bed (ankle-over-knee)    Cognitive Retraining  Problem Solving: Identifying the task; Identifying the problem;General alternative solution;Deductive reason  Executive Functions: Executing cognitive plans;Regulating behavior  Organizing/Sequencing: Breaking task down;Prioritizing  Attention to Task: Distractibility; Single task  Maintains Attention For (Time): 30 seconds  Following Commands: Awareness of environment; Follows one step commands/directions  Safety/Judgement: Decreased awareness of need for assistance;Decreased awareness of need for safety;Decreased insight into deficits  Cues: Tactile cues provided;Verbal cues provided;Visual cues provided    Therapeutic Exercises:   BUE HEP w/ Red Theraband    Pain:  Pre-treatment: 5/10  Post-treatment: 5/10    Activity Tolerance:    Patient able to stand 1-2 minute(s). Patient able to complete ADLs with frequent rest breaks. Patient limited by edema/strength/balance. Patient unsteady     Please refer to the flowsheet for vital signs taken during this treatment.   After treatment:   []  Patient left in no apparent distress sitting up in chair  [x]  Patient left in no apparent distress in bed  [x]  Call bell left within reach  [x]  Nursing notified  []  Caregiver present  []  Bed alarm activated    Thank you for this referral.  Aury Connelly, OTR/L  Time Calculation: 23 mins

## 2018-03-07 NOTE — PROGRESS NOTES
800 Assumed care of patient from Greta Ibarra RN    929 Assessment completed, patient is alert and oriented x's 4, no c/o pain. NSR on the monitor with a HR in the 80's. Lung fields are clear throughout on RA, 02 sat sustained in the mid 90's with no cough noted. Patient is tolerating cardiac diet and voiding without any complications. Scheduled medications administered as ordered without any complications. Patient was cleaned after BM in bedpan. Patient is currently sitting up in bed watching television, no s/s of any distress, call bell and personal belongings within reach, will continue to monitor. 1156   IDR/SLIDR Summary          Patient: Rachana Shabazz. MRN: [id number]    Age: 41 y.o. YOB: 1970 Room/Bed: [facility]/   Admit Diagnosis: Acute exacerbation of CHF (congestive heart failure) ([facility])  Acute on chronic renal insufficiency  Hyponatremia  Anemia of chronic disease  Principal Diagnosis: Acute exacerbation of CHF (congestive heart failure) ([facility])   Goals: discharge home  Readmission: NO  Quality Measure: CHF  VTE Prophylaxis: Mechanical  Influenza Vaccine screening completed? YES  Pneumococcal Vaccine screening completed? YES  Mobility needs: Yes   Nutrition plan:Yes  Consults:P.T, O.T. and Case Management    Financial concerns:No  Escalated to CM? YES  RRAT Score:    Interventions:H2H  Testing due for pt today? NO  LOS: 7 days Expected length of stay 1 days  Discharge plan: discharge home   PCP: Mary Anne Jay MD  Transportation needs: Yes    Days before discharge:ready for discharge  Discharge disposition: Home    Signed:     Nura Bravo RN  3/7/2018  11:57 AM    1226 Scheduled medications administered as ordered without any complications. Patient is currently sitting up in bed consuming lunch, no s/s of any pain or distress, will continue to monitor    1400 NAD, will continue to monitor.     1520 patient received PT/OT, OOB with assistance of a walker without any complications. 1656 Scheduled medications administered as ordered without any complications. Patient is currently sitting up on side of bed consuming dinner, no s/s of any pain or distress, will continue to monitor    1700 Bedside and Verbal shift change report given to Franklin Hollins RN (oncoming nurse) by Amada Green RN (offgoing nurse). Report included the following information SBAR.

## 2018-03-09 ENCOUNTER — HOME CARE VISIT (OUTPATIENT)
Dept: SCHEDULING | Facility: HOME HEALTH | Age: 48
End: 2018-03-09
Payer: SELF-PAY

## 2018-03-09 ENCOUNTER — HOME CARE VISIT (OUTPATIENT)
Dept: HOME HEALTH SERVICES | Facility: HOME HEALTH | Age: 48
End: 2018-03-09

## 2018-03-09 PROCEDURE — G0299 HHS/HOSPICE OF RN EA 15 MIN: HCPCS

## 2018-03-09 PROCEDURE — 400013 HH SOC

## 2018-03-10 VITALS
SYSTOLIC BLOOD PRESSURE: 162 MMHG | RESPIRATION RATE: 14 BRPM | HEART RATE: 93 BPM | TEMPERATURE: 97.8 F | OXYGEN SATURATION: 97 % | DIASTOLIC BLOOD PRESSURE: 99 MMHG

## 2018-03-12 ENCOUNTER — HOME CARE VISIT (OUTPATIENT)
Dept: HOME HEALTH SERVICES | Facility: HOME HEALTH | Age: 48
End: 2018-03-12
Payer: SELF-PAY

## 2018-03-13 ENCOUNTER — HOME CARE VISIT (OUTPATIENT)
Dept: SCHEDULING | Facility: HOME HEALTH | Age: 48
End: 2018-03-13
Payer: SELF-PAY

## 2018-03-14 ENCOUNTER — HOME CARE VISIT (OUTPATIENT)
Dept: SCHEDULING | Facility: HOME HEALTH | Age: 48
End: 2018-03-14
Payer: SELF-PAY

## 2018-03-15 ENCOUNTER — HOME CARE VISIT (OUTPATIENT)
Dept: SCHEDULING | Facility: HOME HEALTH | Age: 48
End: 2018-03-15
Payer: SELF-PAY

## 2018-03-16 ENCOUNTER — HOME CARE VISIT (OUTPATIENT)
Dept: SCHEDULING | Facility: HOME HEALTH | Age: 48
End: 2018-03-16
Payer: SELF-PAY

## 2018-03-19 ENCOUNTER — HOME CARE VISIT (OUTPATIENT)
Dept: SCHEDULING | Facility: HOME HEALTH | Age: 48
End: 2018-03-19
Payer: SELF-PAY

## 2018-04-02 ENCOUNTER — HOME CARE VISIT (OUTPATIENT)
Dept: SCHEDULING | Facility: HOME HEALTH | Age: 48
End: 2018-04-02
Payer: SELF-PAY

## 2018-04-02 PROCEDURE — G0495 RN CARE TRAIN/EDU IN HH: HCPCS

## 2018-07-12 NOTE — ADT AUTH CERT NOTES
LOC:Acute Adult-Heart Failure (2/28/2018) by Elon Gitelman        Review Status Review Entered       In Primary 3/1/2018       Details         REVIEW SUMMARY     Patient: Mack De La Cruz. Review Number: [id number]  Review Status: In Primary     Condition Specific: Yes     Condition Level Of Care Code: ACUTE  Condition Level Of Care Description: Acute        OUTCOMES  Outcome Type: Primary           REVIEW DETAILS     Service Date: 02/28/2018  Admit Date: 02/28/2018  Product: Radha Byrnes Adult  Subset: Heart Failure      (Symptom or finding within 24h)         (Excludes PO medications unless noted)          [X] Select Day, One:              [X] Episode Day 1, One:                  [X] ACUTE, Both:                  ~--Admin, IQ Admin Admin on 03- 09:30 AM--~                  ER noted to have massive [place] [address]. CXR with interstitial edema and pleural effusion. Ths far in the ER he has received carvedilol and 80 mg of intravenous furosemide.                             ~--Admin, IQ Admin Admin on 03- 09:27 AM--~                  ED tx                   iv lasix 80mg                   coreg 25mg                   iv k 10meq   ( k 3.2 )                   po kdur 40meq                          ~--Admin, IQ Admin Admin on 03- 09:23 AM--~                  cxr Cardiomegaly with possible mild interstitial edema and small left effusion.                                          ~--Admin, IQ Admin Admin on 03- 09:22 AM--~                  t 98 p 94 bp 180/110 sats 100% ra                           ~--Admin, IQ Admin Admin on 03- 09:21 AM--~                  ED tachypnea, decreased bs, 3+ edema in lt lower ext, R bleow knee amputation                           ~--Admin, IQ Admin Admin on 03- 09:21 AM--~                  ED sob , weakness and fluid build up   [employer]                  ~--Admin, IQ Admin Admin on 03- 09:20 AM--~                  2/28 presents to ED presents with complaints of shortness of breath, fluid build-up and generalized weakness. Patient reports recently hospitalized at another facility with same issues.  Reports taking Lasix, but has not helped.                          ~--Admin, IQ Admin Admin on 03- 09:19 AM--~                  H&P advanced CKD and CHF with an exacerbation in his CHF                  plan diuresis w/ lasix I&O , daily wt, follow renal function, ESRD probably not to far down the road,                   CKD stage 3-4, htn , anemic of ckd, hx bka                                   [X] Finding, >= One:                          [X] Dyspnea and not returned to baseline after at least 2h treatment, >= One:                              [X] BUN or creatinine > 1.5x ULN and > baseline                              ~--Admin, IQ Admin Admin on 03- 09:31 AM--~                              creat 3.03  H&P documents worsening renal function                                           [ ] Volume overload, >= Two:                                  [X] Edema of extremities                                  ~--Admin, IQ Admin Admin on 03- 09:24 AM--~                                  3+ edema tushar ext                                       [ ] High risk for adverse event, Both:                              [X] Clinical risk factor, >= One:                                  [X] Chronic kidney disease and GFR < 60 mL/min/1.73 m2 (excludes chronic dialysis)                                  ~--Admin, IQ Admin Admin on 03- 09:26 AM--~                                  ckd creat 3.03  stage 3-4 on H&P                                  [X] Intervention, All:                          [X] Oxygenation, One:                              [X] O2 sat >= 92%(0.92) or baseline                              ~--Admin, IQ Admin Admin on 03- 09:28 AM--~                              sats 100% ra                               t 98 p 94 bp 180/110                                      [X] ACE inhibitor, ARB, or ARNI (includes PO), One:                              [X] Contraindicated or not indicated                          [X] Beta blocker (includes PO), One:                              [X] Administered                              ~--Admin, IQ Admin Admin on 03- 09:29 AM--~                              coreg 25mg bid                                      [X] Continuous cardiac monitoring (excludes Holter)                          [X] Diuretic >= 2 doses or dialysis                          ~--Admin, IQ Admin Admin on 03- 09:29 AM--~                          iv lasix 40mg bid ( ED IV lasix 80mg )                                       [X] DVT prophylaxis                          ~--Admin, IQ Admin Admin on 03- 09:29 AM--~                          sq heparin q 8hr                  Version: InterQual® 2017.2  Diana Shrestha  © 2017 [employer] and/or one of its [place]. All Rights Reserved. CPT only © 2016 [employer].   All Rights Reserved.              Additional Notes       H&P EST LOS 3-4 days

## 2020-01-29 ENCOUNTER — APPOINTMENT (OUTPATIENT)
Dept: GENERAL RADIOLOGY | Age: 50
DRG: 853 | End: 2020-01-29
Attending: EMERGENCY MEDICINE
Payer: MEDICARE

## 2020-01-29 ENCOUNTER — APPOINTMENT (OUTPATIENT)
Dept: MRI IMAGING | Age: 50
DRG: 853 | End: 2020-01-29
Attending: HOSPITALIST
Payer: MEDICARE

## 2020-01-29 ENCOUNTER — APPOINTMENT (OUTPATIENT)
Dept: CT IMAGING | Age: 50
DRG: 853 | End: 2020-01-29
Attending: EMERGENCY MEDICINE
Payer: MEDICARE

## 2020-01-29 ENCOUNTER — HOSPITAL ENCOUNTER (INPATIENT)
Age: 50
LOS: 7 days | Discharge: HOME HEALTH | DRG: 853 | End: 2020-02-05
Attending: EMERGENCY MEDICINE | Admitting: HOSPITALIST
Payer: MEDICARE

## 2020-01-29 ENCOUNTER — ANESTHESIA EVENT (OUTPATIENT)
Dept: SURGERY | Age: 50
DRG: 853 | End: 2020-01-29
Payer: MEDICARE

## 2020-01-29 DIAGNOSIS — M86.272 SUBACUTE OSTEOMYELITIS OF LEFT ANKLE (HCC): Primary | ICD-10-CM

## 2020-01-29 DIAGNOSIS — R19.7 NAUSEA VOMITING AND DIARRHEA: ICD-10-CM

## 2020-01-29 DIAGNOSIS — E11.65 TYPE 2 DIABETES MELLITUS WITH HYPERGLYCEMIA, WITHOUT LONG-TERM CURRENT USE OF INSULIN (HCC): ICD-10-CM

## 2020-01-29 DIAGNOSIS — N18.6 ESRD (END STAGE RENAL DISEASE) (HCC): ICD-10-CM

## 2020-01-29 DIAGNOSIS — E86.0 MODERATE DEHYDRATION: ICD-10-CM

## 2020-01-29 DIAGNOSIS — R11.2 NAUSEA VOMITING AND DIARRHEA: ICD-10-CM

## 2020-01-29 PROBLEM — M86.10 ACUTE OSTEOMYELITIS (HCC): Status: ACTIVE | Noted: 2020-01-29

## 2020-01-29 PROBLEM — I10 HTN (HYPERTENSION): Status: ACTIVE | Noted: 2020-01-29

## 2020-01-29 PROBLEM — L03.032 CELLULITIS AND ABSCESS OF TOE OF LEFT FOOT: Status: ACTIVE | Noted: 2020-01-29

## 2020-01-29 PROBLEM — L03.119 CELLULITIS AND ABSCESS OF FOOT: Status: ACTIVE | Noted: 2020-01-29

## 2020-01-29 PROBLEM — Z99.2 ESRD ON HEMODIALYSIS (HCC): Status: ACTIVE | Noted: 2020-01-29

## 2020-01-29 PROBLEM — L02.619 CELLULITIS AND ABSCESS OF FOOT: Status: ACTIVE | Noted: 2020-01-29

## 2020-01-29 PROBLEM — Z89.511 HX OF BKA, RIGHT (HCC): Status: ACTIVE | Noted: 2020-01-29

## 2020-01-29 PROBLEM — K20.90 ESOPHAGITIS: Status: ACTIVE | Noted: 2020-01-29

## 2020-01-29 PROBLEM — L02.612 CELLULITIS AND ABSCESS OF TOE OF LEFT FOOT: Status: ACTIVE | Noted: 2020-01-29

## 2020-01-29 LAB
ALBUMIN SERPL-MCNC: 2.3 G/DL (ref 3.4–5)
ALBUMIN/GLOB SERPL: 0.3 {RATIO} (ref 0.8–1.7)
ALP SERPL-CCNC: 83 U/L (ref 45–117)
ALT SERPL-CCNC: 28 U/L (ref 16–61)
ANION GAP SERPL CALC-SCNC: 15 MMOL/L (ref 3–18)
AST SERPL-CCNC: 30 U/L (ref 10–38)
ATRIAL RATE: 105 BPM
BASOPHILS # BLD: 0 K/UL (ref 0–0.1)
BASOPHILS NFR BLD: 0 % (ref 0–2)
BILIRUB SERPL-MCNC: 0.7 MG/DL (ref 0.2–1)
BUN SERPL-MCNC: 84 MG/DL (ref 7–18)
BUN/CREAT SERPL: 6 (ref 12–20)
CALCIUM SERPL-MCNC: 8.8 MG/DL (ref 8.5–10.1)
CALCULATED P AXIS, ECG09: 83 DEGREES
CALCULATED R AXIS, ECG10: 5 DEGREES
CALCULATED T AXIS, ECG11: 69 DEGREES
CHLORIDE SERPL-SCNC: 89 MMOL/L (ref 100–111)
CK MB CFR SERPL CALC: 1 % (ref 0–4)
CK MB SERPL-MCNC: 1.1 NG/ML (ref 5–25)
CK SERPL-CCNC: 109 U/L (ref 39–308)
CO2 SERPL-SCNC: 32 MMOL/L (ref 21–32)
CREAT SERPL-MCNC: 14.2 MG/DL (ref 0.6–1.3)
DIAGNOSIS, 93000: NORMAL
DIFFERENTIAL METHOD BLD: ABNORMAL
EOSINOPHIL # BLD: 0.2 K/UL (ref 0–0.4)
EOSINOPHIL NFR BLD: 1 % (ref 0–5)
ERYTHROCYTE [DISTWIDTH] IN BLOOD BY AUTOMATED COUNT: 19.9 % (ref 11.6–14.5)
GLOBULIN SER CALC-MCNC: 7.5 G/DL (ref 2–4)
GLUCOSE SERPL-MCNC: 93 MG/DL (ref 74–99)
HCT VFR BLD AUTO: 40.2 % (ref 36–48)
HGB BLD-MCNC: 12.9 G/DL (ref 13–16)
LYMPHOCYTES # BLD: 1.2 K/UL (ref 0.9–3.6)
LYMPHOCYTES NFR BLD: 10 % (ref 21–52)
MAGNESIUM SERPL-MCNC: 2.6 MG/DL (ref 1.6–2.6)
MCH RBC QN AUTO: 24.6 PG (ref 24–34)
MCHC RBC AUTO-ENTMCNC: 32.1 G/DL (ref 31–37)
MCV RBC AUTO: 76.7 FL (ref 74–97)
MONOCYTES # BLD: 1.2 K/UL (ref 0.05–1.2)
MONOCYTES NFR BLD: 10 % (ref 3–10)
NEUTS SEG # BLD: 9.4 K/UL (ref 1.8–8)
NEUTS SEG NFR BLD: 79 % (ref 40–73)
P-R INTERVAL, ECG05: 148 MS
PLATELET # BLD AUTO: 356 K/UL (ref 135–420)
PMV BLD AUTO: 9.5 FL (ref 9.2–11.8)
POTASSIUM SERPL-SCNC: 4.6 MMOL/L (ref 3.5–5.5)
PROT SERPL-MCNC: 9.8 G/DL (ref 6.4–8.2)
Q-T INTERVAL, ECG07: 366 MS
QRS DURATION, ECG06: 80 MS
QTC CALCULATION (BEZET), ECG08: 483 MS
RBC # BLD AUTO: 5.24 M/UL (ref 4.7–5.5)
SODIUM SERPL-SCNC: 136 MMOL/L (ref 136–145)
TROPONIN I SERPL-MCNC: <0.02 NG/ML (ref 0–0.04)
VENTRICULAR RATE, ECG03: 105 BPM
WBC # BLD AUTO: 12 K/UL (ref 4.6–13.2)

## 2020-01-29 PROCEDURE — 83735 ASSAY OF MAGNESIUM: CPT

## 2020-01-29 PROCEDURE — 5A1D70Z PERFORMANCE OF URINARY FILTRATION, INTERMITTENT, LESS THAN 6 HOURS PER DAY: ICD-10-PCS | Performed by: HOSPITALIST

## 2020-01-29 PROCEDURE — 85025 COMPLETE CBC W/AUTO DIFF WBC: CPT

## 2020-01-29 PROCEDURE — 74011250636 HC RX REV CODE- 250/636: Performed by: EMERGENCY MEDICINE

## 2020-01-29 PROCEDURE — 90935 HEMODIALYSIS ONE EVALUATION: CPT

## 2020-01-29 PROCEDURE — 74011250637 HC RX REV CODE- 250/637: Performed by: HOSPITALIST

## 2020-01-29 PROCEDURE — 87077 CULTURE AEROBIC IDENTIFY: CPT

## 2020-01-29 PROCEDURE — 99285 EMERGENCY DEPT VISIT HI MDM: CPT

## 2020-01-29 PROCEDURE — 87070 CULTURE OTHR SPECIMN AEROBIC: CPT

## 2020-01-29 PROCEDURE — 87340 HEPATITIS B SURFACE AG IA: CPT

## 2020-01-29 PROCEDURE — 65270000029 HC RM PRIVATE

## 2020-01-29 PROCEDURE — 87186 SC STD MICRODIL/AGAR DIL: CPT

## 2020-01-29 PROCEDURE — 86706 HEP B SURFACE ANTIBODY: CPT

## 2020-01-29 PROCEDURE — 74011000258 HC RX REV CODE- 258: Performed by: HOSPITALIST

## 2020-01-29 PROCEDURE — 74176 CT ABD & PELVIS W/O CONTRAST: CPT

## 2020-01-29 PROCEDURE — 71045 X-RAY EXAM CHEST 1 VIEW: CPT

## 2020-01-29 PROCEDURE — 73721 MRI JNT OF LWR EXTRE W/O DYE: CPT

## 2020-01-29 PROCEDURE — 82550 ASSAY OF CK (CPK): CPT

## 2020-01-29 PROCEDURE — 93005 ELECTROCARDIOGRAM TRACING: CPT

## 2020-01-29 PROCEDURE — 74011250636 HC RX REV CODE- 250/636: Performed by: HOSPITALIST

## 2020-01-29 PROCEDURE — 80053 COMPREHEN METABOLIC PANEL: CPT

## 2020-01-29 PROCEDURE — 73630 X-RAY EXAM OF FOOT: CPT

## 2020-01-29 PROCEDURE — 96374 THER/PROPH/DIAG INJ IV PUSH: CPT

## 2020-01-29 PROCEDURE — 96365 THER/PROPH/DIAG IV INF INIT: CPT

## 2020-01-29 PROCEDURE — 96375 TX/PRO/DX INJ NEW DRUG ADDON: CPT

## 2020-01-29 PROCEDURE — 74011000258 HC RX REV CODE- 258: Performed by: EMERGENCY MEDICINE

## 2020-01-29 RX ORDER — ISOSORBIDE MONONITRATE 60 MG/1
60 TABLET, EXTENDED RELEASE ORAL DAILY
Status: DISCONTINUED | OUTPATIENT
Start: 2020-01-30 | End: 2020-02-05 | Stop reason: HOSPADM

## 2020-01-29 RX ORDER — OXYCODONE HYDROCHLORIDE 5 MG/1
5 TABLET ORAL
Status: CANCELLED | OUTPATIENT
Start: 2020-01-29 | End: 2020-01-30

## 2020-01-29 RX ORDER — PROCHLORPERAZINE EDISYLATE 5 MG/ML
5 INJECTION INTRAMUSCULAR; INTRAVENOUS ONCE
Status: CANCELLED | OUTPATIENT
Start: 2020-01-29 | End: 2020-01-29

## 2020-01-29 RX ORDER — SODIUM CHLORIDE 0.9 % (FLUSH) 0.9 %
5-40 SYRINGE (ML) INJECTION EVERY 8 HOURS
Status: CANCELLED | OUTPATIENT
Start: 2020-01-29

## 2020-01-29 RX ORDER — LEVOFLOXACIN 5 MG/ML
750 INJECTION, SOLUTION INTRAVENOUS ONCE
Status: COMPLETED | OUTPATIENT
Start: 2020-01-29 | End: 2020-01-29

## 2020-01-29 RX ORDER — CLINDAMYCIN PHOSPHATE 600 MG/50ML
600 INJECTION, SOLUTION INTRAVENOUS EVERY 8 HOURS
Status: DISCONTINUED | OUTPATIENT
Start: 2020-01-29 | End: 2020-01-31

## 2020-01-29 RX ORDER — ONDANSETRON 2 MG/ML
4 INJECTION INTRAMUSCULAR; INTRAVENOUS
Status: COMPLETED | OUTPATIENT
Start: 2020-01-29 | End: 2020-01-29

## 2020-01-29 RX ORDER — CARVEDILOL 12.5 MG/1
25 TABLET ORAL 2 TIMES DAILY WITH MEALS
Status: DISCONTINUED | OUTPATIENT
Start: 2020-01-29 | End: 2020-02-05 | Stop reason: HOSPADM

## 2020-01-29 RX ORDER — DOCUSATE SODIUM 100 MG/1
100 CAPSULE, LIQUID FILLED ORAL 2 TIMES DAILY
Status: DISCONTINUED | OUTPATIENT
Start: 2020-01-29 | End: 2020-01-29

## 2020-01-29 RX ORDER — HYDRALAZINE HYDROCHLORIDE 25 MG/1
25 TABLET, FILM COATED ORAL 3 TIMES DAILY
Status: DISCONTINUED | OUTPATIENT
Start: 2020-01-29 | End: 2020-02-05 | Stop reason: HOSPADM

## 2020-01-29 RX ORDER — LEVOFLOXACIN 5 MG/ML
500 INJECTION, SOLUTION INTRAVENOUS
Status: DISCONTINUED | OUTPATIENT
Start: 2020-01-31 | End: 2020-01-31

## 2020-01-29 RX ORDER — AMLODIPINE BESYLATE 5 MG/1
10 TABLET ORAL DAILY
Status: DISCONTINUED | OUTPATIENT
Start: 2020-01-30 | End: 2020-01-29

## 2020-01-29 RX ORDER — HEPARIN SODIUM 5000 [USP'U]/ML
5000 INJECTION, SOLUTION INTRAVENOUS; SUBCUTANEOUS EVERY 8 HOURS
Status: DISCONTINUED | OUTPATIENT
Start: 2020-01-29 | End: 2020-02-05 | Stop reason: HOSPADM

## 2020-01-29 RX ORDER — SODIUM CHLORIDE 0.9 % (FLUSH) 0.9 %
5-40 SYRINGE (ML) INJECTION AS NEEDED
Status: CANCELLED | OUTPATIENT
Start: 2020-01-29

## 2020-01-29 RX ORDER — ACETAMINOPHEN 325 MG/1
650 TABLET ORAL
Status: DISCONTINUED | OUTPATIENT
Start: 2020-01-29 | End: 2020-02-05 | Stop reason: HOSPADM

## 2020-01-29 RX ORDER — FLUMAZENIL 0.1 MG/ML
0.2 INJECTION INTRAVENOUS
Status: CANCELLED | OUTPATIENT
Start: 2020-01-29

## 2020-01-29 RX ORDER — HYDRALAZINE HYDROCHLORIDE 20 MG/ML
10 INJECTION INTRAMUSCULAR; INTRAVENOUS
Status: DISCONTINUED | OUTPATIENT
Start: 2020-01-29 | End: 2020-02-05 | Stop reason: HOSPADM

## 2020-01-29 RX ORDER — CLINDAMYCIN PHOSPHATE 900 MG/50ML
900 INJECTION, SOLUTION INTRAVENOUS
Status: COMPLETED | OUTPATIENT
Start: 2020-01-29 | End: 2020-01-29

## 2020-01-29 RX ORDER — SODIUM CHLORIDE, SODIUM LACTATE, POTASSIUM CHLORIDE, CALCIUM CHLORIDE 600; 310; 30; 20 MG/100ML; MG/100ML; MG/100ML; MG/100ML
75 INJECTION, SOLUTION INTRAVENOUS CONTINUOUS
Status: CANCELLED | OUTPATIENT
Start: 2020-01-29

## 2020-01-29 RX ORDER — FENTANYL CITRATE 50 UG/ML
25 INJECTION, SOLUTION INTRAMUSCULAR; INTRAVENOUS AS NEEDED
Status: CANCELLED | OUTPATIENT
Start: 2020-01-29

## 2020-01-29 RX ORDER — FUROSEMIDE 40 MG/1
80 TABLET ORAL 2 TIMES DAILY
Status: DISCONTINUED | OUTPATIENT
Start: 2020-01-29 | End: 2020-01-31

## 2020-01-29 RX ORDER — IBUPROFEN 200 MG
1 TABLET ORAL EVERY 24 HOURS
Status: DISCONTINUED | OUTPATIENT
Start: 2020-01-29 | End: 2020-01-29

## 2020-01-29 RX ORDER — SODIUM CHLORIDE 9 MG/ML
100 INJECTION, SOLUTION INTRAVENOUS CONTINUOUS
Status: DISCONTINUED | OUTPATIENT
Start: 2020-01-29 | End: 2020-01-30

## 2020-01-29 RX ORDER — NALOXONE HYDROCHLORIDE 0.4 MG/ML
0.4 INJECTION, SOLUTION INTRAMUSCULAR; INTRAVENOUS; SUBCUTANEOUS AS NEEDED
Status: DISCONTINUED | OUTPATIENT
Start: 2020-01-29 | End: 2020-02-05 | Stop reason: HOSPADM

## 2020-01-29 RX ORDER — AMLODIPINE BESYLATE 5 MG/1
10 TABLET ORAL DAILY
Status: DISCONTINUED | OUTPATIENT
Start: 2020-01-29 | End: 2020-01-31

## 2020-01-29 RX ORDER — ONDANSETRON 2 MG/ML
4 INJECTION INTRAMUSCULAR; INTRAVENOUS
Status: DISCONTINUED | OUTPATIENT
Start: 2020-01-29 | End: 2020-02-05 | Stop reason: HOSPADM

## 2020-01-29 RX ORDER — MORPHINE SULFATE 4 MG/ML
4 INJECTION INTRAVENOUS
Status: COMPLETED | OUTPATIENT
Start: 2020-01-29 | End: 2020-01-29

## 2020-01-29 RX ORDER — DIPHENHYDRAMINE HYDROCHLORIDE 50 MG/ML
12.5 INJECTION, SOLUTION INTRAMUSCULAR; INTRAVENOUS
Status: DISCONTINUED | OUTPATIENT
Start: 2020-01-29 | End: 2020-02-05 | Stop reason: HOSPADM

## 2020-01-29 RX ORDER — HYDRALAZINE HYDROCHLORIDE 25 MG/1
25 TABLET, FILM COATED ORAL 3 TIMES DAILY
Status: DISCONTINUED | OUTPATIENT
Start: 2020-01-29 | End: 2020-01-29

## 2020-01-29 RX ORDER — NALOXONE HYDROCHLORIDE 0.4 MG/ML
0.1 INJECTION, SOLUTION INTRAMUSCULAR; INTRAVENOUS; SUBCUTANEOUS AS NEEDED
Status: CANCELLED | OUTPATIENT
Start: 2020-01-29

## 2020-01-29 RX ADMIN — ONDANSETRON 4 MG: 2 INJECTION INTRAMUSCULAR; INTRAVENOUS at 22:17

## 2020-01-29 RX ADMIN — HEPARIN SODIUM 5000 UNITS: 5000 INJECTION INTRAVENOUS; SUBCUTANEOUS at 17:41

## 2020-01-29 RX ADMIN — CARVEDILOL 25 MG: 12.5 TABLET, FILM COATED ORAL at 17:42

## 2020-01-29 RX ADMIN — CLINDAMYCIN PHOSPHATE 600 MG: 600 INJECTION, SOLUTION INTRAVENOUS at 14:49

## 2020-01-29 RX ADMIN — CLINDAMYCIN PHOSPHATE 900 MG: 900 INJECTION, SOLUTION INTRAVENOUS at 07:07

## 2020-01-29 RX ADMIN — LEVOFLOXACIN 750 MG: 5 INJECTION, SOLUTION INTRAVENOUS at 09:53

## 2020-01-29 RX ADMIN — HYDRALAZINE HYDROCHLORIDE 25 MG: 25 TABLET, FILM COATED ORAL at 14:01

## 2020-01-29 RX ADMIN — PIPERACILLIN AND TAZOBACTAM 3.38 G: 3; .375 INJECTION, POWDER, LYOPHILIZED, FOR SOLUTION INTRAVENOUS at 07:53

## 2020-01-29 RX ADMIN — AMLODIPINE BESYLATE 10 MG: 5 TABLET ORAL at 14:01

## 2020-01-29 RX ADMIN — PIPERACILLIN AND TAZOBACTAM 2.25 G: 2; .25 INJECTION, POWDER, LYOPHILIZED, FOR SOLUTION INTRAVENOUS at 17:41

## 2020-01-29 RX ADMIN — CLINDAMYCIN PHOSPHATE 600 MG: 600 INJECTION, SOLUTION INTRAVENOUS at 22:17

## 2020-01-29 RX ADMIN — ONDANSETRON 4 MG: 2 INJECTION INTRAMUSCULAR; INTRAVENOUS at 04:13

## 2020-01-29 RX ADMIN — MORPHINE SULFATE 4 MG: 4 INJECTION INTRAVENOUS at 04:25

## 2020-01-29 RX ADMIN — FUROSEMIDE 80 MG: 40 TABLET ORAL at 22:17

## 2020-01-29 RX ADMIN — ACETAMINOPHEN 650 MG: 325 TABLET ORAL at 14:01

## 2020-01-29 RX ADMIN — ACETAMINOPHEN 650 MG: 325 TABLET ORAL at 20:17

## 2020-01-29 RX ADMIN — SODIUM CHLORIDE 100 ML/HR: 900 INJECTION, SOLUTION INTRAVENOUS at 11:27

## 2020-01-29 RX ADMIN — ONDANSETRON 4 MG: 2 INJECTION INTRAMUSCULAR; INTRAVENOUS at 17:42

## 2020-01-29 NOTE — PROGRESS NOTES
1103-Verbal shift change report given to Beverly Barrera RN (oncoming nurse) by ED RN (offgoing nurse). Report included the following information SBAR, Kardex, Intake/Output and MAR.     1115-Patient arrived on the unit. Oriented patient to room and call light. Patient A/OX4. Patient in bed resting quietly. No complaints of pain or nausea at this time. Dialysis in with patient. 1740-Patient requested something for nausea. Administered Zofran 4MG IV.     1900-Dialysis complete    Bedside and Verbal shift change report given to AllenRN (oncoming nurse) by Beverly Barrera RN (offgoing nurse). Report included the following information SBAR, Kardex, Intake/Output and MAR.

## 2020-01-29 NOTE — ROUTINE PROCESS
TRANSFER - OUT REPORT:    Verbal report given to Spring(name) on Maria Del Rosario Esquivel.  being transferred to Medical(unit) for routine progression of care       Report consisted of patients Situation, Background, Assessment and   Recommendations(SBAR). Information from the following report(s) SBAR was reviewed with the receiving nurse. Lines:   Peripheral IV 01/29/20 Right Hand (Active)   Site Assessment Clean, dry, & intact 1/29/2020  3:05 AM   Phlebitis Assessment 0 1/29/2020  3:05 AM   Infiltration Assessment 0 1/29/2020  3:05 AM   Dressing Status Clean, dry, & intact 1/29/2020  3:05 AM   Dressing Type Transparent 1/29/2020  3:05 AM   Hub Color/Line Status Blue;Patent; Flushed 1/29/2020  3:05 AM   Alcohol Cap Used Yes 1/29/2020  3:05 AM        Opportunity for questions and clarification was provided.       Patient transported with:   Keelvar

## 2020-01-29 NOTE — CONSULTS
RENAL CONSULT  2020    Patient:  Eva Frazier :  1970  Gender:  male  MRN #:  [id number]    Consulting Physician:  Deacon Chicas DO,  Assessment:    )  ESRD  DM  HTN  Anemia of CKD  Foot ulcer    Plan:    HD today  Left foot ulcer per primary team    History of Present Illness:  Eva Frazier is a 54y.o. year old male with ongoing ESRD, who has left foot ulcer had a boil that drained. He had vomiting and some some blood tinged on his vomit. Missed one day of dialysis. Get HD at [facility] under the care of Dr. Dominick Ga        Past Medical History:   Diagnosis Date    Chronic kidney disease     [facility] [facility]     Heart failure ([facility]     Hypertension      Past Surgical History:   Procedure Laterality Date    HX ORTHOPAEDIC      right BKA 2017     History reviewed. No pertinent family history. Allergies   Allergen Reactions    Vancomycin Other (comments)     kayla syndrome     Current Facility-Administered Medications   Medication Dose Route Frequency Provider Last Rate Last Dose    0.9% sodium chloride infusion  100 mL/hr IntraVENous CONTINUOUS Demi Chen  mL/hr at 20 1127 100 mL/hr at 20 1127    acetaminophen (TYLENOL) tablet 650 mg  650 mg Oral Q4H PRN Demi Chen MD        [facility]) injection 0.4 mg  0.4 mg IntraVENous PRN Demi Chen MD        diphenhydrAMINE (BENADRYL) injection 12.5 mg  12.5 mg IntraVENous Q4H PRN Demi Chen MD        ondansetron [facility]) injection 4 mg  4 mg IntraVENous Q4H PRN Demi Chen MD        heparin (porcine) injection 5,000 Units  5,000 Units SubCUTAneous Q8H Demi Chen MD        hydrALAZINE (APRESOLINE) 20 mg/mL injection 10 mg  10 mg IntraVENous Q6H PRN Demi Chen MD        piperacillin-tazobactam (ZOSYN) 2.25 g in 0.9% sodium chloride (MBP/ADV) 50 mL MBP  2.25 g IntraVENous Q8H Demi Chen MD           Review of Symptoms:  Below symptoms negative if not in [place].   Consitutional Symptoms: Fever, weight loss, weight gain, fatigue  Eyes:  pain, sudden vision loss, blurry vision, double vision             bleeding nose, sore throat, hoarseness  GI: nausea, vomiting, diarrhea, pain, blood in stool  Pulmonary; Cough, Shortness of breath, Wheezing's  Cardiac: chest pain, palpitation  Musculoskeletal: difficulty walking, falls, pain over muscle, joint pain, weakness  : dysuria,  Neurologia: dizziness, sy rash, redness, ulcer ncope, focal weakness, difficulty speaking  Integumentary:  Psychiatric:  Depression suicidal ideation    Objective:  Visit Vitals  BP (!) 194/106 (BP 1 Location: Right arm, BP Patient Position: At rest)   Pulse 97   Temp 97.5 °F (36.4 °C)   Resp 15   Ht 5' 7\" (1.702 m)   Wt 83.9 kg (185 lb)   SpO2 100%   BMI 28.98 kg/m²         Well developed and groomed  Eyes: anicteric  Ears, nose, mouth and throat without visible mass, scars, lesion  Neck: supple  Respiratory: good effort, no rales, good breath sounds, no wheezings  Cardiovascular:  Normal rate, regular rythem normal S1 S2 no rubs or gallops  GI: soft, nontender  Musculoskeletal: No clubbing cynosis, no petechia  Neruoligcal: no focal dificit grossly  Psychicatric: fair judgment and insights, good memory. No micki affect. Non anxiouse. Laboratory Data:  Lab Results   Component Value Date    BUN 84 (H) 01/29/2020    BUN 54 (H) 03/07/2018    BUN 50 (H) 03/06/2018     01/29/2020     03/07/2018     03/06/2018    CO2 32 01/29/2020    CO2 19 (L) 03/07/2018    CO2 18 (L) 03/06/2018     Lab Results   Component Value Date    WBC 12.0 01/29/2020    HGB 12.9 (L) 01/29/2020    HCT 40.2 01/29/2020       Imaging have been reviewed:  )Xr Foot Lt Min 3 V    Result Date: 1/29/2020  EXAM: LEFT FOOT RADIOGRAPHS CLINICAL INDICATION/HISTORY: pain -Additional: None COMPARISON: None TECHNIQUE: 3 views of the left foot _______________ FINDINGS: BONES: Osseous erosive changes at the posterior calcaneus at the level of the Achilles insertion.  No evidence of acute fracture or dislocation. Prior second distal metatarsal and proximal phalangeal amputation. SOFT TISSUES: Soft tissue swelling about the calcaneus with ulceration along the plantar surface and subcutaneous gas at the level of the Achilles tendon. Vascular calcifications. _______________     IMPRESSION: Osseous erosive changes of the posterior calcaneus with overlying soft tissue swelling and skin ulceration compatible with osteomyelitis. Ct Abd Pelv Wo Cont    Result Date: 1/29/2020  EXAM: CT of the abdomen and pelvis INDICATION: Left-sided abdominal pain. Diarrhea. COMPARISON: None. TECHNIQUE: Axial CT imaging of the abdomen and pelvis was performed without intravenous contrast. Multiplanar reformats were generated. One or more dose reduction techniques were used on this CT: automated exposure control, adjustment of the mAs and/or kVp according to patient size, and iterative reconstruction techniques. The specific techniques used on this CT exam have been documented in the patient's electronic medical record. Digital Imaging and Communications in the Medicine (DICOM) format image data are available to nonaffiliated external healthcare facilities or entities on a secure, media free, reciprocally searchable basis with patient authorization for at least a 12 month period after this study. _______________ FINDINGS: LOWER CHEST: Mild regions of basilar atelectasis. Thick-walled appearance of the distal thoracic esophagus. LIVER, BILIARY: Liver is normal. No biliary dilation. Subtle density level in the gallbladder suggests sludge. No calcified gallstones. Geraldine Po PANCREAS: Normal. SPLEEN: Normal. ADRENALS: Normal. KIDNEYS: No urinary tract stones or renal obstructive changes. LYMPH NODES: Nonspecific left inguinal lymphadenopathy up to 15 mm short axis. Milder left iliac chain lymphadenopathy, up to 12 mm short axis. GASTROINTESTINAL TRACT: No bowel obstruction or convincing bowel wall thickening.  Portions of the colon are underdistended. Noninflamed colonic diverticula are present. No pericolonic inflammatory stranding. Unremarkable appendix. PELVIC ORGANS: Underdistended urinary bladder. No free pelvic fluid. VASCULATURE: Unremarkable. BONES: No acute or aggressive osseous abnormalities identified. OTHER: None. _______________     IMPRESSION: 1. No acute pathology within the abdomen or pelvis. 2. Nonspecific left inguinal lymphadenopathy up to 15 mm short axis. Milder left iliac chain lymphadenopathy, up to 12 mm short axis. 3. Thick wall appearance of the distal thoracic esophagus, could reflect esophagitis. Xr Chest Port    Result Date: 1/29/2020  CLINICAL: Concern for aspiration, vomiting. COMPARISON: February 28, 2018. A portable view of the chest obtained upright: The lungs and pleural spaces are clear. The mediastinum is unremarkable in appearance. Old healed right posterior rib fracture. Impression: No acute process.       Total time spent 45 minutes    )Yanet Landry DO,

## 2020-01-29 NOTE — ED TRIAGE NOTES
Pt arrives via EMS c/o vomiting and not feeling well. Pt states that last time he had dialysis was Saturday. Pt states that he has not felt well in several days. EMS gave 4mg PO Zofran w/no relief.

## 2020-01-29 NOTE — PROGRESS NOTES
Problem: Falls - Risk of  Goal: *Absence of Falls  Description  Document Tia Manus Fall Risk and appropriate interventions in the flowsheet.   Outcome: Progressing Towards Goal  Note: Fall Risk Interventions:  Mobility Interventions: Assess mobility with egress test, Communicate number of staff needed for ambulation/transfer              Elimination Interventions: Call light in reach, Toileting schedule/hourly rounds

## 2020-01-29 NOTE — H&P
History & Physical    Patient: Manny Jaeger. MRN: [id number]  CSN: [id number]    YOB: 1970  Age: 54 y.o. Sex: male      DOA: 1/29/2020  Primary Care Provider:  Manjit Roche MD      Assessment/Plan     Hospital Problems  Date Reviewed: 3/4/2018          Codes Class Noted POA    Dehydration ICD-10-CM: E86.0  ICD-9-CM: 276.51  1/29/2020 Unknown        ESRD on hemodialysis [facility]) ICD-10-CM: N18.6, Z99.2  ICD-9-CM: 585.6, V45.11  1/29/2020 Unknown        Cellulitis and abscess of foot ICD-10-CM: L03.119, L02.619  ICD-9-CM: 682.7  1/29/2020         HTN (hypertension) ICD-10-CM: I10  ICD-9-CM: 401.9  1/29/2020 Unknown        Acute osteomyel[facility] ([facility] ICD-10-CM: M86.10  ICD-9-CM: 730.00  1/29/2020 Unknown        Hx of BKA, right ([facility] [address] ICD-10-CM: Z89.511  ICD-9-CM: V49.75  1/29/2020 Unknown        Esophagitis ICD-10-CM: K20.9  ICD-9-CM: 530.10  1/29/2020 Unknown        Anemia of chronic disease ICD-10-CM: D63.8  ICD-9-CM: 285.29  2/28/2018 Yes                Admit to medical     Cellulitis and abscess of the foot, acute osteomyelitis  MRI was done, appreciate Dr. Rachel Woodard  on board. Planning surgery tomorrow. Wound culture, continue IV antibiotics Zosyn and Lovenox, he is allergic to vancomycin. Added clindamycin  We will have infection disease on board on Friday    Dehydration  IV fluid normal saline. End-stage renal disease on HD  Scheduled for HD Tuesday Thursday and Saturday, missed HD yesterday  Case discussed with Dr. Radha Mcclellan    Hypertension:  Continue home medication. Right BKA  Fall precaution. Chronic anemia due to end-stage renal disease  Continue monitor H&H    Esophagitis   ppi     Full code  Estimate  length of stay : 2-3 day    DVT : heparin  ppi proph  CC: n/v        HPI:     Manny Solid. is a 54 y.o. male with PMHX of history of end-stage renal disease on HD, HTN , heart failure came to ER due to nausea /vomiting and abnormal pain since last Saturday.   He did miss his dialyzed due to nausea or vomiting. CT abdomen indicated  possible esophagitis. He also noted he is  left ankle became swelling and \" something pop up with the swelling\" yesterday. He did not see any  physician for his ankle. He denies any fever chills. In ER his white count was  12 K, his K4.6. Creatinine 14.2. He was giving Levaquin /clindamycin and Zosyn per ER physician. Nephrology and podiatry has been called. Denies any slurred speech/headache/cp/blurred vission/d/c/palpitation/gait change/bleeding. Denies smoking/ any alcohol or drug use. Visit Vitals  BP (!) 194/106 (BP 1 Location: Right arm, BP Patient Position: At rest)   Pulse 97   Temp 97.5 °F (36.4 °C)   Resp 15   Ht 5' 7\" (1.702 m)   Wt 83.9 kg (185 lb)   SpO2 100%   BMI 28.98 kg/m²      O2 Device: Room air      Past Medical History:   Diagnosis Date    Chronic kidney disease     Diabetes ([address]     Heart failure ([facility] [address]     Hypertension        Past Surgical History:   Procedure Laterality Date    HX ORTHOPAEDIC      right BKA 2017     Family History    Medical History Relation Name Comments   Diabetes Father       Sickle cell anemia Mother         Relation Name Status Comments   Father        Mother              Social History     Socioeconomic History    Marital status: LEGALLY      Spouse name: Not on file    Number of children: Not on file    Years of education: Not on file    Highest education level: Not on file   Tobacco Use    Smoking status: Former Smoker    Smokeless tobacco: Never Used   Substance and Sexual Activity    Alcohol use: No    Drug use: No       Prior to Admission medications    Medication Sig Start Date End Date Taking? Authorizing Provider   amLODIPine (NORVASC) 10 mg tablet Take 1 Tab by mouth daily. 3/8/18   Oma Tucker MD   carvedilol (COREG) 25 mg tablet Take 1 Tab by mouth two (2) times daily (with meals).  3/7/18   Oma Tucker MD   hydrALAZINE (APRESOLINE) 25 mg tablet Take 1 Tab by mouth three (3) times daily. 3/7/18   Elena Haas MD   isosorbide mononitrate ER (IMDUR) 60 mg CR tablet Take 1 Tab by mouth daily. 3/8/18   Elena Haas MD   furosemide (LASIX) 80 mg tablet Take 1 Tab by mouth two (2) times a day. 3/7/18   Elena Haas MD       Allergies   Allergen Reactions    Vancomycin Other (comments)     kayla syndrome       Review of Systems  Gen: No fever, chills, +malaise, no  weight loss/gain. Heent: No headache, rhinorrhea, epistaxis, ear pain, hearing loss, sinus pain, neck pain/stiffness, sore throat. Heart: No chest pain, palpitations, FU, pnd, or orthopnea. Resp: No cough, hemoptysis, wheezing and shortness of breath. GI: nausea, vomiting, no diarrhea, constipation, melena or hematochezia. : No urinary obstruction, dysuria or hematuria. Derm: left ankle lesion , broken skin  Musc/skeletal: left ankle   Vasc: No edema, cyanosis or claudication. Endo: No heat/cold intolerance, no polyuria,polydipsia or polyphagia. Neuro: No unilateral weakness, numbness, tingling. No seizures. Heme: No easy bruising or bleeding. Physical Exam:     Physical Exam:  Visit Vitals  BP (!) 194/106 (BP 1 Location: Right arm, BP Patient Position: At rest)   Pulse 97   Temp 97.5 °F (36.4 °C)   Resp 15   Ht 5' 7\" (1.702 m)   Wt 83.9 kg (185 lb)   SpO2 100%   BMI 28.98 kg/m²      O2 Device: Room air    Temp (24hrs), Av °F (36.7 °C), Min:97.5 °F (36.4 °C), Max:98.4 °F (36.9 °C)     0701 -  1900  In: 100 [I.V.:100]  Out: -    No intake/output data recorded. General:  Awake, cooperative, no distress. Tired    Head:  Normocephalic, without obvious abnormality, atraumatic. Eyes:  Conjunctivae/corneas clear, sclera anicteric, PERRL, EOMs intact. Nose: Nares normal. No drainage or sinus tenderness. Throat: Lips, mucosa, and tongue normal. Very dry    Neck: Supple, symmetrical, trachea midline, no adenopathy. Lungs:   Clear to auscultation bilaterally. Heart:  Regular rate and rhythm, S1, S2 normal, no murmur, click, rub or gallop. Abdomen: Soft, non-tender. Bowel sounds normal. No masses,  No organomegaly. Extremities: Extremities normal, rt bka see below    Pulses: 2+ and symmetric all extremities. Skin: Pus coming out of left ankle, swelling of left foot,    Neurologic: CNII-XII intact. No focal motor or sensory deficit.        Labs Reviewed:    BMP:   Lab Results   Component Value Date/Time     01/29/2020 04:46 AM    K 4.6 01/29/2020 04:46 AM    CL 89 (L) 01/29/2020 04:46 AM    CO2 32 01/29/2020 04:46 AM    AGAP 15 01/29/2020 04:46 AM    GLU 93 01/29/2020 04:46 AM    BUN 84 (H) 01/29/2020 04:46 AM    CREA 14.20 (H) 01/29/2020 04:46 AM    GFRAA 5 (L) 01/29/2020 04:46 AM    GFRNA 4 (L) 01/29/2020 04:46 AM     CMP:   Lab Results   Component Value Date/Time     01/29/2020 04:46 AM    K 4.6 01/29/2020 04:46 AM    CL 89 (L) 01/29/2020 04:46 AM    CO2 32 01/29/2020 04:46 AM    AGAP 15 01/29/2020 04:46 AM    GLU 93 01/29/2020 04:46 AM    BUN 84 (H) 01/29/2020 04:46 AM    CREA 14.20 (H) 01/29/2020 04:46 AM    GFRAA 5 (L) 01/29/2020 04:46 AM    GFRNA 4 (L) 01/29/2020 04:46 AM    CA 8.8 01/29/2020 04:46 AM    MG 2.6 01/29/2020 04:46 AM    ALB 2.3 (L) 01/29/2020 04:46 AM    TP 9.8 (H) 01/29/2020 04:46 AM    GLOB 7.5 (H) 01/29/2020 04:46 AM    AGRAT 0.3 (L) 01/29/2020 04:46 AM    SGOT 30 01/29/2020 04:46 AM    ALT 28 01/29/2020 04:46 AM     CBC:   Lab Results   Component Value Date/Time    WBC 12.0 01/29/2020 03:55 AM    HGB 12.9 (L) 01/29/2020 03:55 AM    HCT 40.2 01/29/2020 03:55 AM     01/29/2020 03:55 AM     All Cardiac Markers in the last 24 hours:   Lab Results   Component Value Date/Time     01/29/2020 04:46 AM    CKMB 1.1 01/29/2020 04:46 AM    CKND1 1.0 01/29/2020 04:46 AM    TROIQ <0.02 01/29/2020 04:46 AM     Recent Glucose Results:   Lab Results   Component Value Date/Time    GLU 93 01/29/2020 04:46 AM     ABG: No results found for: PH, PHI, PCO2, PCO2I, PO2, PO2I, HCO3, HCO3I, FIO2, FIO2I  COAGS: No results found for: APTT, PTP, INR, INREXT, INREXT  Liver Panel:   Lab Results   Component Value Date/Time    ALB 2.3 (L) 01/29/2020 04:46 AM    TP 9.8 (H) 01/29/2020 04:46 AM    GLOB 7.5 (H) 01/29/2020 04:46 AM    AGRAT 0.3 (L) 01/29/2020 04:46 AM    SGOT 30 01/29/2020 04:46 AM    ALT 28 01/29/2020 04:46 AM    AP 83 01/29/2020 04:46 AM     Pancreatic Markers: No results found for: AMYLPOCT, AML, LIPPOCT, LPSE    Procedures/imaging: see electronic medical records for all procedures/Xrays and details which were not copied into this note but were reviewed prior to creation of Nondaudrey Mei MD, Internal Medicine     CC: Norma Donaldson MD

## 2020-01-29 NOTE — H&P (VIEW-ONLY)
Tramaine Mccain [address], [address] [facility], [place], [address] [place], [address] [facility] 
[phone](H) [id number]) 
[phone](Z) Consult: left foot diabetic foot ulcer with osteomyelitis. Admission:with cc of vomiting and left foot ulcer 1/29/2020 Plan: 1. Limb salvage attempt left foot:incision, aggressive debridement left heel, bone biopsy. 2. I discussed the above information with the patient to his understanding. I made no guarantees and said he could lose his foot/leg. 3. Patient is scheduled for surgery 1- @1PM 
4. Patient is to be NPO at midnight tonight and stop all anticoagulants. A/ 
ESRD 
DM 
HTN Anemia of CKD Foot ulcer HPI: 
Patient is an [place]  male with a history of diabetes and chronic left foot ulcer. He is 54 y.o. and has been getting wound care from THE [facility] clinic. Patient also has a history of CKD, and Heart Failure presenting via EMS to the ED. Pt c/o vomiting with 8/10 left sided abdominal pain x 01/25/2020, after his last dialysis. Pt attends dialysis T/R/Sat and typically receives 4 hour treatments. Pt's last dialysis treatment was completed in full. Pt states that vomiting symptoms began first and notes streaks of blood when vomiting (brown emesis, but not coffee ground); also has nausea and diarrhea (watery with some blood streaking). Vomiting is worse with any PO intake. Pt was given Zofran with no relief, en route from EMS. Pt denies being around individuals with similar symptoms, but does note eating [employer] a few days ago. Pt notes that he can still make urine. Pt did notice a posterior left ankle wound/heel ulcer and attempted to clean area with hydrogen peroxide a few days ago, noting purulent drainage with cleaning. Pt lives by himself, but has home health that visits periodically. Pt has allergy to Vancomycin. Current Outpatient Medications Medication Sig Dispense Refill  amLODIPine (NORVASC) 10 mg tablet Take 1 Tab by mouth daily. 30 Tab 0  carvedilol (COREG) 25 mg tablet Take 1 Tab by mouth two (2) times daily (with meals). 60 Tab 0  
 hydrALAZINE (APRESOLINE) 25 mg tablet Take 1 Tab by mouth three (3) times daily. 90 Tab 0  
 isosorbide mononitrate ER (IMDUR) 60 mg CR tablet Take 1 Tab by mouth daily. 30 Tab 0  
 furosemide (LASIX) 80 mg tablet Take 1 Tab by mouth two (2) times a day. 60 Tab 0  
  
  
Past History  
  
Past Medical History: 
    
Past Medical History:  
Diagnosis Date  Chronic kidney disease    
 Diabetes ([address]    
 Heart failure (HCC)    
 Hypertension    
  
  
Past Surgical History: 
     
Past Surgical History:  
Procedure Laterality Date  HX ORTHOPAEDIC      
  right BKA 2017  
  
  
Family History: 
History reviewed. No pertinent family history. Tobacco Use  Smoking status: Former Smoker  Smokeless tobacco: Never Used Substance Use Topics  Alcohol use: No  
 Drug use: No  
  
  
Allergies: Allergies Allergen Reactions  Vancomycin Other (comments) ROS: 
Constitutional:fever, lethargy HEENT:+visual difficulties(blurry, double vision), sore throat. Lungs:sob, wheezing Cardiac:chest pains Neuro:dizziness, rash, redness, foot ulcer Psych:decreased affect/depression Objective: 
Visit Vitals BP (!) 194/106 (BP 1 Location: Right arm, BP Patient Position: At rest) Pulse 97 Temp 97.5 °F (36.4 °C) Resp 15 Ht 5' 7\" (1.702 m) Wt 83.9 kg (185 lb) SpO2 100% BMI 28.98 kg/m²  
  
Patient is alert & oriented black male, well developed and good attention to grooming Vasc:palpable pedal pulses, CFT wnl Derm:+plantar hell ulcer and posterior heel ulcer left foot with some sero sang discharge. +mild foul odor, negative cellulitis, mild calor. No bone exposure, but both ulcer probe to palpate bone. Results for Sebas Chinchilla (MRN [id number]) as of 1/29/2020 12:59 Ref.  Range 1/29/2020 03:55  
 WBC Latest Ref Range: 4.6 - 13.2 K/uL 12.0  
RBC Latest Ref Range: 4.70 - 5.50 M/uL 5.24 HGB Latest Ref Range: 13.0 - 16.0 g/dL 12.9 (L) HCT Latest Ref Range: 36.0 - 48.0 % 40.2 MCV Latest Ref Range: 74.0 - 97.0 FL 76.7 MCH Latest Ref Range: 24.0 - 34.0 PG 24.6 MCHC Latest Ref Range: 31.0 - 37.0 g/dL 32.1 RDW Latest Ref Range: 11.6 - 14.5 % 19.9 (H) PLATELET Latest Ref Range: 135 - 420 K/uL 356 MPV Latest Ref Range: 9.2 - 11.8 FL 9.5 NEUTROPHILS Latest Ref Range: 40 - 73 % 79 (H) LYMPHOCYTES Latest Ref Range: 21 - 52 % 10 (L) MONOCYTES Latest Ref Range: 3 - 10 % 10 EOSINOPHILS Latest Ref Range: 0 - 5 % 1  
BASOPHILS Latest Ref Range: 0 - 2 % 0  
DF Latest Units:   AUTOMATED  
ABS. NEUTROPHILS Latest Ref Range: 1.8 - 8.0 K/UL 9.4 (H)  
ABS. LYMPHOCYTES Latest Ref Range: 0.9 - 3.6 K/UL 1.2  
ABS. MONOCYTES Latest Ref Range: 0.05 - 1.2 K/UL 1.2  
ABS. EOSINOPHILS Latest Ref Range: 0.0 - 0.4 K/UL 0.2  
ABS. BASOPHILS Latest Ref Range: 0.0 - 0.1 K/UL 0.0 Results for Evette Singh (MRN [id number]) as of 1/29/2020 12:59 Ref. Range 1/29/2020 04:46 Sodium Latest Ref Range: 136 - 145 mmol/L 136 Potassium Latest Ref Range: 3.5 - 5.5 mmol/L 4.6 Chloride Latest Ref Range: 100 - 111 mmol/L 89 (L)  
CO2 Latest Ref Range: 21 - 32 mmol/L 32 Anion gap Latest Ref Range: 3.0 - 18 mmol/L 15 Glucose Latest Ref Range: 74 - 99 mg/dL 93 BUN Latest Ref Range: 7.0 - 18 MG/DL 84 (H) Creatinine Latest Ref Range: 0.6 - 1.3 MG/DL 14.20 (H) BUN/Creatinine ratio Latest Ref Range: 12 - 20   6 (L) Calcium Latest Ref Range: 8.5 - 10.1 MG/DL 8.8 Magnesium Latest Ref Range: 1.6 - 2.6 mg/dL 2.6 GFR est non-AA Latest Ref Range: >60 ml/min/1.73m2 4 (L)  
GFR est AA Latest Ref Range: >60 ml/min/1.73m2 5 (L) Bilirubin, total Latest Ref Range: 0.2 - 1.0 MG/DL 0.7 Protein, total Latest Ref Range: 6.4 - 8.2 g/dL 9.8 (H) Albumin Latest Ref Range: 3.4 - 5.0 g/dL 2.3 (L) Globulin Latest Ref Range: 2.0 - 4.0 g/dL 7.5 (H) A-G Ratio Latest Ref Range: 0.8 - 1.7   0.3 (L) ALT (SGPT) Latest Ref Range: 16 - 61 U/L 28 AST Latest Ref Range: 10 - 38 U/L 30 Alk. phosphatase Latest Ref Range: 45 - 117 U/L 83  
CK Latest Ref Range: 39 - 308 U/L 109 CK-MB Index Latest Ref Range: 0.0 - 4.0 % 1.0 CK - MB Latest Ref Range: <3.6 ng/ml 1.1 Troponin-I, Qt. Latest Ref Range: 0.0 - 0.045 NG/ML <0.02  
 
X-ray left foot 1- FINDINGS: 
  
BONES: Osseous erosive changes at the posterior calcaneus at the level of the 
Achilles insertion. No evidence of acute fracture or dislocation. Prior second 
distal metatarsal and proximal phalangeal amputation. 
  
SOFT TISSUES: Soft tissue swelling about the calcaneus with ulceration along the 
plantar surface and subcutaneous gas at the level of the Achilles tendon. 
  
Vascular calcifications. 
  
_______________ 
  
IMPRESSION IMPRESSION: 
  
Osseous erosive changes of the posterior calcaneus with overlying soft tissue 
swelling and skin ulceration compatible with osteomyelitis. 1- MRI EXTREMITY ( INFECTION), ankle and hindfoot, left, without contrast 
  
History: Swelling signs of inflammation, with ulcer, possible infection and 
suspected abscess and/or osteomyelitis, for further evaluation 
  
MR technique: 
  
Sagittal and/or coronal T1 weighted spin echo, STIR fast spin echo, transverse/ 
axial T1 weighted spin echo, T2 weighted, STIR fast spin echo sequences 
precontrast . No contrast administered per referring physician request, 
potential limitation in evaluation of certain components of infection 
  
No prior MR.  
Comparison prior exam, plain films today . 
  
MR FINDINGS: 
  
There is a moderate-sized plantar and small posterior heel ulcer, both 
associated with sinus tracts, local induration of soft tissue with decreased T1 
and intermediate to increased T2 subcutaneous fat obliteration. 
  
 The plantar sinus tract extends into a C shaped 2 cm plantar heel complex fluid 
collection containing several tiny foci of signal void presumed gas  consistent 
with abscess. This tracks along the juxta periosteal plantar aspect of the 
calcaneus with secondary extension into and erosion of the plantar calcaneal 
cortex with extension posterosuperiorly along broad J shaped 4 cm region of the 
plantar and posterior tuberosity bone. This is associated with a large 
surrounding region of confluent decreased T1 and increased STIR signal marrow 
edema extending into the body and neck of the calcaneus. There is associated 
rupture of the lateral bundle of the plantar aponeurosis from the tuberosity 
origin. Perifascial extends to central bundle with there is fusiform low signal 
thickening of the aponeurosis but no discrete disruption from the calcaneus. 
  
Posterior sinus tract extends directly into the posterior distal Achilles tendon 3 cm above the insertion. This is associated with diffuse thickening, extensive 
punctate signal void, radiographically soft tissue gas within the tendon and the 
adjacent paratenon and retrocalcaneal fat. This is also associated with avulsion 
of the Achilles tendon with 1 cm retraction. Heterotopic ossification is present 
radiographically, but no local marrow signal is evident locally, probably 
secondary to edema within the marrow 
  Diffuse plantar muscle edema with mild atrophy is present. 
  
MISC: 
Unremarkable subtalar joint other than minimal joint effusion. Small os trigonum 
of normal marrow signal. Unremarkable tarsal sinus with intact extensor 
retinacula the, cervical and interosseous ligaments. 
  
Mild degenerative arthritis with marginal osteophyte formation, ankle joint 
without discrete effusion. No talocrural osteochondral defect. 
  
Unremarkable peroneal tendons.  Top normal sheath fluid volume. 
  
 Unremarkable extensor tendons. Unremarkable medial flexor tendons. [place] of 
sheath fluid at the level of the knot of Denn[place] Schwab, primarily related to the flexor 
hallucis longus tendon. 
  
Mild edema within the flexor hallucis longus and peroneus brevis muscles at the 
ankle level. Diffuse cellular edema posterior knee including around the 
posterior tibial neurovascular bundle. 
  
Intact major ankle ligaments including syndesmotic ligaments. . 
  
  
IMPRESSION IMPRESSION: 
  
1. Noncontrast exam. Plantar and posterior heel ulcers with sinus tracts, local 
induration presumed granulation tissue. Local C shaped 2 cm plantar heel 
complex fluid collection consistent with abscess. Secondary disruption of the 
origin lateral bundle plantar aponeurosis superimposed on mild generalized 
degenerative tendinosis 
  
2. Findings consistent with extensive calcaneal posterior/plantar tuberosity and 
body acute osteomyelitis. 
  
3. Extension of posterior sinus tract gas into distal Achilles tendon. Additional insertional avulsion of the Achilles tendon from the posterior 
tuberosity with 1 cm retraction, diffuse intratendinous edema and probable 
superimposed degenerative tendinosis. Secondary moderate peritendinous edema 
extending broadly in the pre-Achilles fat. 
  
4. Findings consistent with flexor hallucis longus tenosynovitis primarily at 
the knot of [place]. Additional mild edema or atrophy of the flexor hallucis 
longus and peroneus brevis muscles. 
  
5. Small posterior subtalar joint effusion but no definite subtalar or ankle 
involvement. Mild ankle degenerative joint disease. 
  
6. Diffuse plantar muscle edema may reflect myositis and/or denervation atrophy

## 2020-01-29 NOTE — ED PROVIDER NOTES
EMERGENCY DEPARTMENT HISTORY AND PHYSICAL EXAM    Date: 1/29/2020  Patient Name: Sydnie Syemour. History of Presenting Illness     Chief Complaint   Patient presents with    Vomiting         History Provided By: Patient    Additional History (Context):     4:41 AM      Sydnie Seymour. is a 54 y.o. male with pertinent PMHx of HTN, DM, CKD, and Heart Failure presenting via EMS to the ED. Pt c/o vomiting with 8/10 left sided abdominal pain x 01/25/2020, after his last dialysis. Pt attends dialysis T/R/Sat and typically receives 4 hour treatments. Pt's last dialysis treatment was completed in full. Pt states that vomiting symptoms began first and notes streaks of blood when vomiting (brown emesis, but not coffee ground); also has nausea and diarrhea (watery with some blood streaking). Vomiting is worse with any PO intake. Pt was given Zofran with no relief, en route from EMS. Pt denies being around individuals with similar symptoms, but does note eating Nayla Munch a few days ago. Pt notes that he can still make urine. Pt did notice a posterior left ankle wound/heel ulcer and attempted to clean area with hydrogen peroxide a few days ago, noting purulent drainage with cleaning. Pt lives by himself, but has home health that visits periodically. Pt has allergy to Vancomycin. PCP: Rush Rosenberg MD   Nephrology: Princess Hollins MD  Social Hx: No  tobacco use, No alcohol use, No illicit drug use     There are no other complaints, changes, or physical findings at this time. Current Outpatient Medications   Medication Sig Dispense Refill    amLODIPine (NORVASC) 10 mg tablet Take 1 Tab by mouth daily. 30 Tab 0    carvedilol (COREG) 25 mg tablet Take 1 Tab by mouth two (2) times daily (with meals). 60 Tab 0    hydrALAZINE (APRESOLINE) 25 mg tablet Take 1 Tab by mouth three (3) times daily. 90 Tab 0    isosorbide mononitrate ER (IMDUR) 60 mg CR tablet Take 1 Tab by mouth daily.  30 Tab 0    furosemide (LASIX) 80 mg tablet Take 1 Tab by mouth two (2) times a day. 61 Tab 0       Past History     Past Medical History:  Past Medical History:   Diagnosis Date    Chronic kidney disease     Diabetes ([address]     Heart failure ([facility] [address]     Hypertension        Past Surgical History:  Past Surgical History:   Procedure Laterality Date    HX ORTHOPAEDIC      right BKA 2017       Family History:  History reviewed. No pertinent family history. Social History:  Social History     Tobacco Use    Smoking status: Former Smoker    Smokeless tobacco: Never Used   Substance Use Topics    Alcohol use: No    Drug use: No       Allergies: Allergies   Allergen Reactions    Vancomycin Other (comments)     kayla syndrome         Review of Systems   Review of Systems   Constitutional: Negative. Negative for chills, diaphoresis and fever. HENT: Negative. Negative for congestion, ear pain, sore throat and trouble swallowing. Eyes: Negative. Negative for photophobia, pain, redness and visual disturbance. Respiratory: Negative. Negative for cough, chest tightness, shortness of breath and wheezing. Cardiovascular: Negative. Negative for chest pain and palpitations. Gastrointestinal: Positive for abdominal pain (Left), blood in stool (streaks), diarrhea, nausea and vomiting. Genitourinary: Negative for dysuria and frequency. Musculoskeletal: Negative. Negative for back pain, joint swelling and neck pain. Skin: Positive for wound (Left Achilles/ Heel). Neurological: Negative. Negative for seizures, syncope and headaches. Psychiatric/Behavioral: Negative. Negative for behavioral problems and confusion. The patient is not nervous/anxious. All other systems reviewed and are negative.         Physical Exam     Vitals:    01/29/20 0346 01/29/20 0430   BP: (!) 225/110 157/78   Pulse: (!) 111 97   Resp: 16 9   Temp: 98.4 °F (36.9 °C)    SpO2: 100% 100%   Weight: 83.9 kg (185 lb)      Physical Exam  Vitals signs and nursing note reviewed. Constitutional:       General: He is not in acute distress. Appearance: He is well-developed. He is ill-appearing. He is not diaphoretic. Comments: Middle-aged Male; Chronically ill, appearing older than actual age   HENT:      Head: Normocephalic and atraumatic. Right Ear: External ear normal.      Left Ear: External ear normal.      Mouth/Throat:      Mouth: Mucous membranes are dry. Pharynx: No oropharyngeal exudate. Comments: Mouth is dry, poor dentition and halitosis are noted. Eyes:      General: No scleral icterus. Conjunctiva/sclera: Conjunctivae normal.      Pupils: Pupils are equal, round, and reactive to light. Comments: Eyes appear sunken. Neck:      Musculoskeletal: Normal range of motion and neck supple. Thyroid: No thyromegaly. Vascular: No JVD. Trachea: No tracheal deviation. Cardiovascular:      Rate and Rhythm: Normal rate and regular rhythm. Pulses:           Dorsalis pedis pulses are 1+ on the left side. Heart sounds: Murmur (2/6 Systolic ) present. No gallop. Comments: Grade 2/6 systolic heart murmur, however there are no audible S3 gallop heart sounds. Pulmonary:      Effort: Pulmonary effort is normal. No respiratory distress. Comments: There are crackles in the right base of the lung, which are not audible on the left; but only making a minor effort to breathe, even when coerced. Abdominal:      General: There is no distension. Palpations: Abdomen is soft. Tenderness: There is abdominal tenderness (LUQ/LLQ). There is no guarding or rebound. Comments: Markedly tender left side of abdomen, noted to the upper and lower areas. Hypoactive bowel sounds. No peritoneal sings  No visible surgical incisions. Musculoskeletal: Normal range of motion.    Feet:      Right foot:      Amputation: Right leg is amputated above knee. (AKA)     Left foot:      Skin integrity: Ulcer (Heel) and dry skin present. Comments:   LEFT:   There is no edema and skin is dry. Wet, malodorous drainage noted from an achilies wound just above the wilber calcaneous. Inferior dry heel ulcer with no drainage or malodor. Mid/Fore foot show no redness or swelling. Capillary refill delayed 3-5 seconds in all toes. Lymphadenopathy:      Cervical: No cervical adenopathy. Skin:     General: Skin is warm and dry. Capillary Refill: Capillary Refill delayed in all toes. Comments: Skin is very dry. See msk   Neurological:      Mental Status: He is alert and oriented to person, place, and time. Cranial Nerves: No cranial nerve deficit. Coordination: Coordination normal.      Deep Tendon Reflexes: Reflexes are normal and symmetric. Reflexes normal.      Comments: Normal   Psychiatric:         Speech: Speech is delayed. Speech is not slurred. Behavior: Behavior normal.         Thought Content: Thought content normal.         Cognition and Memory: Cognition and memory normal.         Judgment: Judgment normal.      Comments: Speech is delayed and not slurred with a  whispering voice. Cognition and memory are still intact.              Diagnostic Study Results     Labs -     Recent Results (from the past 12 hour(s))   EKG, 12 LEAD, INITIAL    Collection Time: 01/29/20  3:48 AM   Result Value Ref Range    Ventricular Rate 105 BPM    Atrial Rate 105 BPM    P-R Interval 148 ms    QRS Duration 80 ms    Q-T Interval 366 ms    QTC Calculation (Bezet) 483 ms    Calculated P Axis 83 degrees    Calculated R Axis 5 degrees    Calculated T Axis 69 degrees    Diagnosis       Sinus tachycardia  Possible Left atrial enlargement  Borderline ECG  When compared with ECG of 28-FEB-2018 10:52,  Nonspecific T wave abnormality no longer evident in Inferior leads  T wave inversion no longer evident in Lateral leads     CBC WITH AUTOMATED DIFF    Collection Time: 01/29/20  3:55 AM   Result Value Ref Range    WBC 12.0 4.6 - 13.2 K/uL    RBC 5.24 4.70 - 5.50 M/uL    HGB 12.9 (L) 13.0 - 16.0 g/dL    HCT 40.2 36.0 - 48.0 %    MCV 76.7 74.0 - 97.0 FL    MCH 24.6 24.0 - 34.0 PG    MCHC 32.1 31.0 - 37.0 g/dL    RDW 19.9 (H) 11.6 - 14.5 %    PLATELET 726 028 - 134 K/uL    MPV 9.5 9.2 - 11.8 FL    NEUTROPHILS 79 (H) 40 - 73 %    LYMPHOCYTES 10 (L) 21 - 52 %    MONOCYTES 10 3 - 10 %    EOSINOPHILS 1 0 - 5 %    BASOPHILS 0 0 - 2 %    ABS. NEUTROPHILS 9.4 (H) 1.8 - 8.0 K/UL    ABS. LYMPHOCYTES 1.2 0.9 - 3.6 K/UL    ABS. MONOCYTES 1.2 0.05 - 1.2 K/UL    ABS. EOSINOPHILS 0.2 0.0 - 0.4 K/UL    ABS. BASOPHILS 0.0 0.0 - 0.1 K/UL    DF AUTOMATED     CARDIAC PANEL,(CK, CKMB & TROPONIN)    Collection Time: 01/29/20  4:46 AM   Result Value Ref Range     39 - 308 U/L    CK - MB 1.1 <3.6 ng/ml    CK-MB Index 1.0 0.0 - 4.0 %    Troponin-I, QT <0.02 0.0 - 7.997 NG/ML   METABOLIC PANEL, COMPREHENSIVE    Collection Time: 01/29/20  4:46 AM   Result Value Ref Range    Sodium 136 136 - 145 mmol/L    Potassium 4.6 3.5 - 5.5 mmol/L    Chloride 89 (L) 100 - 111 mmol/L    CO2 32 21 - 32 mmol/L    Anion gap 15 3.0 - 18 mmol/L    Glucose 93 74 - 99 mg/dL    BUN 84 (H) 7.0 - 18 MG/DL    Creatinine 14.20 (H) 0.6 - 1.3 MG/DL    BUN/Creatinine ratio 6 (L) 12 - 20      GFR est AA 5 (L) >60 ml/min/1.73m2    GFR est non-AA 4 (L) >60 ml/min/1.73m2    Calcium 8.8 8.5 - 10.1 MG/DL    Bilirubin, total 0.7 0.2 - 1.0 MG/DL    ALT (SGPT) 28 16 - 61 U/L    AST (SGOT) 30 10 - 38 U/L    Alk. phosphatase 83 45 - 117 U/L    Protein, total 9.8 (H) 6.4 - 8.2 g/dL    Albumin 2.3 (L) 3.4 - 5.0 g/dL    Globulin 7.5 (H) 2.0 - 4.0 g/dL    A-G Ratio 0.3 (L) 0.8 - 1.7     MAGNESIUM    Collection Time: 01/29/20  4:46 AM   Result Value Ref Range    Magnesium 2.6 1.6 - 2.6 mg/dL       Radiologic Studies -   XR CHEST PORT   Final Result   Impression:      No acute process.       CT ABD PELV WO CONT    (Results Pending)     CT Results  (Last 48 hours)    None CXR Results  (Last 48 hours)               01/29/20 0533  XR CHEST PORT Final result    Impression:  Impression:       No acute process. Narrative:  CLINICAL: Concern for aspiration, vomiting. COMPARISON: February 28, 2018. A portable view of the chest obtained upright:       The lungs and pleural spaces are clear. The mediastinum is unremarkable in   appearance. Old healed right posterior rib fracture. Medical Decision Making   I am the first provider for this patient. I reviewed the vital signs, available nursing notes, past medical history, past surgical history, family history and social history. Vital Signs-Reviewed the patient's vital signs. Pulse Oximetry Analysis - 100% on RA     Cardiac Monitor:  Rate: 88 bpm  Rhythm: NSR    EKG interpretation: (EMS)   Sinus tachycardia at ~115 bpm.   Peaked T-waves. Nml QRS and no ischemia  EKG read by Rafa Alberto. Michael Arroyo MD at 03:31 AM    EKG interpretation: (Preliminary)  Sinus tachycardia at 105 bpm.  Peaked T-waves. EKG read by Rafa Alberto. Michael Arroyo MD at 03:48 AM    Records Reviewed: Nursing Notes and Old Medical Records    Provider Notes (Medical Decision Making): Left sided abd pain concerning for diverticular disease, perforation, and obstruction. His kidney disease is already present, but could be worse by infection or sepsis. He does not trigger for sepsis. Lungs sound slightly wet due to aspiration or very likely edema, however his poor intake and vomiting are likely protective of hyper-K. His EKG only shows mild hyper-K changes. PROCEDURES:  Procedures    MEDICATIONS GIVEN IN THE ED:  Medications   ondansetron (ZOFRAN) injection 4 mg (4 mg IntraVENous Given 1/29/20 0413)   morphine injection 4 mg (4 mg IntraVENous Given 1/29/20 0425)        ED COURSE:   04:41 AM  Initial assessment performed. CONSULT NOTE:   7:10 AM  Rafa Alberto.  Michael Arroyo MD spoke with Param Murphy MD,   Specialty: Hospitalist  Discussed pt's hx, disposition, and available diagnostic and imaging results. Reviewed care plans. Consultant will admit the pt to medicine service, will also contact podiatry as well as nephrology services for assistance in his care. .  Written by PAULA Carcamo, as dictated by Aliya Arias MD.    CONSULT NOTE:   7:20 AM  Estefania Arias MD spoke with Dr. Haley Rudolph MD,   Specialty: Nephrologist   Discussed pt's hx, disposition, and available diagnostic and imaging results. Reviewed care plans. Consultant will see the patient help assist with fluid balance as well as management of medications due to his end-stage renal condition. Written by Aliya Arias MD.    CONSULT NOTE:   7:30 AM  Estefania Arias MD spoke with Dr. Tami Garcia MD,   Specialty: Dietary  Discussed pt's hx, disposition, and available diagnostic and imaging results. Reviewed care plans. Consultant states well he is listed as being on call he is currently undergoing a training and education seminar while up in [place]. He gave us the cell phone number for his partner Dr. Marbella Light at area code [phone]. I did call this physician but was only able to leave a voicemail on the cell phone  Written by by Estefania Fu. Sabrina Arias MD.    7:52 AM  CONSULT NOTE:   Estefania Arias MD spoke with Marbella Light MD,   Specialty: Podiatry  Discussed pt's hx, disposition, and available diagnostic and imaging results. Reviewed care plans. Consultant will assist with his surgical management. Written by Aliya Arias MD.      Diagnosis and Disposition     Critical Care Time: NONE    Core Measures:  For Hospitalized Patients:    1. Hospitalization Decision Time:  The decision to hospitalize the patient was made by Estefania Arias MD at 6:45 AM   on 1/29/2020    2.  Aspirin: Aspirin was not given because the patient is a bleeding risk    7:20 AM  Patient is being admitted to the hospital by Yary Ramirez MD. The results of their tests and reasons for their admission have been discussed with them and/or available family. They convey agreement and understanding for the need to be admitted and for their admission diagnosis. CONDITIONS ON ADMISSION:  Sepsis is not present at the time of admission. Deep Vein Thrombosis is not present at the time of admission. Thrombosis is not present at the time of admission. Urinary Tract Infection is not present at the time of admission. Pneumonia is not present at the time of admission. Wound infection is present at the time of admission. Pressure Ulcer is present at the time of admission. CLINICAL IMPRESSION:  1. Subacute osteomyelitis of left ankle (HCC)    2. Nausea vomiting and diarrhea    3. Moderate dehydration    4. ESRD (end stage renal disease) ([facility] [address]       PLAN:  1. ADMIT to medical service. _______________________________    Attestations: This note is prepared by Hannah Koroma, acting as Scribe for Armand Parish. Ricardo George MD.    Armand Parish. Ricardo George MD:  The scribe's documentation has been prepared under my direction and personally reviewed by me in its entirety.  I confirm that the note above accurately reflects all work, treatment, procedures, and medical decision making performed by me.  _______________________________

## 2020-01-29 NOTE — PROGRESS NOTES
Problem: Chronic Renal Failure  Goal: *Fluid and electrolytes stabilized  Outcome: Progressing Towards Goal     Problem: Patient Education: Go to Patient Education Activity  Goal: Patient/Family Education  Outcome: Progressing Towards Goal

## 2020-01-29 NOTE — CONSULTS
[facility], [address] [facility], [address], [address] [place] [address]  [phone](V)  [phone](T)  [phone](V)      Consult: left foot diabetic foot ulcer with osteomyelitis. Admission:with cc of vomiting and left foot ulcer 1/29/2020    Plan:  1. Limb salvage attempt left foot:incision, aggressive debridement left heel, bone biopsy. 2. I discussed the above information with the patient to his understanding. I made no guarantees and said he could lose his foot/leg. 3. Patient is scheduled for surgery 1- @1PM  4. Patient is to be NPO at midnight tonight and stop all anticoagulants. A/  ESRD  DM  HTN  Anemia of CKD  Foot ulcer    HPI:  Patient is an [place]  male with a history of diabetes and chronic left foot ulcer. He is 54 y.o. and has been getting wound care [facility]. Patient also has a history of CKD, and Heart Failure presenting via EMS to the ED. Pt c/o vomiting with 8/10 left sided abdominal pain x 01/25/2020, after his last dialysis. Pt attends dialysis T/R/Sat and typically receives 4 hour treatments. Pt's last dialysis treatment was completed in full. Pt states that vomiting symptoms began first and notes streaks of blood when vomiting (brown emesis, but not coffee ground); also has nausea and diarrhea (watery with some blood streaking). Vomiting is worse with any PO intake. Pt was given Zofran with no relief, en route from EMS. Pt denies being around individuals with similar symptoms, but does note eating Colerain Caul a few days ago. Pt notes that he can still make urine. Pt did notice a posterior left ankle wound/heel ulcer and attempted to clean area with hydrogen peroxide a few days ago, noting purulent drainage with cleaning. Pt lives by himself, but has home health that visits periodically. Pt has allergy to Vancomycin.     Current Outpatient Medications   Medication Sig Dispense Refill    amLODIPine (NORVASC) 10 mg tablet Take 1 Tab by mouth daily. 30 Tab 0    carvedilol (COREG) 25 mg tablet Take 1 Tab by mouth two (2) times daily (with meals). 60 Tab 0    hydrALAZINE (APRESOLINE) 25 mg tablet Take 1 Tab by mouth three (3) times daily. 90 Tab 0    isosorbide mononitrate ER (IMDUR) 60 mg CR tablet Take 1 Tab by mouth daily. 30 Tab 0    furosemide (LASIX) 80 mg tablet Take 1 Tab by mouth two (2) times a day. 61 Tab 0         Past History      Past Medical History:       Past Medical History:   Diagnosis Date    Chronic kidney disease      Diabetes ([facility]      Heart failure ([facility]      Hypertension           Past Surgical History:        Past Surgical History:   Procedure Laterality Date    HX ORTHOPAEDIC         right BKA 2017         Family History:  History reviewed. No pertinent family history. Tobacco Use    Smoking status: Former Smoker    Smokeless tobacco: Never Used   Substance Use Topics    Alcohol use: No    Drug use: No         Allergies: Allergies   Allergen Reactions    Vancomycin Other (comments)       ROS:  Constitutional:fever, lethargy  HEENT:+visual difficulties(blurry, double vision), sore throat. Lungs:sob, wheezing  Cardiac:chest pains  Neuro:dizziness, rash, redness, foot ulcer  Psych:decreased affect/depression    Objective:  Visit Vitals  BP (!) 194/106 (BP 1 Location: Right arm, BP Patient Position: At rest)   Pulse 97   Temp 97.5 °F (36.4 °C)   Resp 15   Ht 5' 7\" (1.702 m)   Wt 83.9 kg (185 lb)   SpO2 100%   BMI 28.98 kg/m²      Patient is alert & oriented black male, well developed and good attention to grooming    Vasc:palpable pedal pulses, CFT wnl  Derm:+plantar hell ulcer and posterior heel ulcer left foot with some sero sang discharge. +mild foul odor, negative cellulitis, mild calor. No bone exposure, but both ulcer probe to palpate bone. Results for Bart Martin (MRN [id number]) as of 1/29/2020 12:59   Ref.  Range 1/29/2020 03:55   WBC Latest Ref Range: 4.6 - 13.2 K/uL 12.0 RBC Latest Ref Range: 4.70 - 5.50 M/uL 5.24   HGB Latest Ref Range: 13.0 - 16.0 g/dL 12.9 (L)   HCT Latest Ref Range: 36.0 - 48.0 % 40.2   MCV Latest Ref Range: 74.0 - 97.0 FL 76.7   MCH Latest Ref Range: 24.0 - 34.0 PG 24.6   MCHC Latest Ref Range: 31.0 - 37.0 g/dL 32.1   RDW Latest Ref Range: 11.6 - 14.5 % 19.9 (H)   PLATELET Latest Ref Range: 135 - 420 K/uL 356   MPV Latest Ref Range: 9.2 - 11.8 FL 9.5   NEUTROPHILS Latest Ref Range: 40 - 73 % 79 (H)   LYMPHOCYTES Latest Ref Range: 21 - 52 % 10 (L)   MONOCYTES Latest Ref Range: 3 - 10 % 10   EOSINOPHILS Latest Ref Range: 0 - 5 % 1   BASOPHILS Latest Ref Range: 0 - 2 % 0   DF Latest Units:   AUTOMATED   ABS. NEUTROPHILS Latest Ref Range: 1.8 - 8.0 K/UL 9.4 (H)   ABS. LYMPHOCYTES Latest Ref Range: 0.9 - 3.6 K/UL 1.2   ABS. MONOCYTES Latest Ref Range: 0.05 - 1.2 K/UL 1.2   ABS. EOSINOPHILS Latest Ref Range: 0.0 - 0.4 K/UL 0.2   ABS. BASOPHILS Latest Ref Range: 0.0 - 0.1 K/UL 0.0   Results for Gemma Ward (MRN [id number]) as of 1/29/2020 12:59   Ref.  Range 1/29/2020 04:46   Sodium Latest Ref Range: 136 - 145 mmol/L 136   Potassium Latest Ref Range: 3.5 - 5.5 mmol/L 4.6   Chloride Latest Ref Range: 100 - 111 mmol/L 89 (L)   CO2 Latest Ref Range: 21 - 32 mmol/L 32   Anion gap Latest Ref Range: 3.0 - 18 mmol/L 15   Glucose Latest Ref Range: 74 - 99 mg/dL 93   BUN Latest Ref Range: 7.0 - 18 MG/DL 84 (H)   Creatinine Latest Ref Range: 0.6 - 1.3 MG/DL 14.20 (H)   BUN/Creatinine ratio Latest Ref Range: 12 - 20   6 (L)   Calcium Latest Ref Range: 8.5 - 10.1 MG/DL 8.8   Magnesium Latest Ref Range: 1.6 - 2.6 mg/dL 2.6   GFR est non-AA Latest Ref Range: >60 ml/min/1.73m2 4 (L)   GFR est AA Latest Ref Range: >60 ml/min/1.73m2 5 (L)   Bilirubin, total Latest Ref Range: 0.2 - 1.0 MG/DL 0.7   Protein, total Latest Ref Range: 6.4 - 8.2 g/dL 9.8 (H)   Albumin Latest Ref Range: 3.4 - 5.0 g/dL 2.3 (L)   Globulin Latest Ref Range: 2.0 - 4.0 g/dL 7.5 (H)   A-G Ratio Latest Ref Range: 0.8 - 1.7   0.3 (L)   ALT (SGPT) Latest Ref Range: 16 - 61 U/L 28   AST Latest Ref Range: 10 - 38 U/L 30   Alk. phosphatase Latest Ref Range: 45 - 117 U/L 83   CK Latest Ref Range: 39 - 308 U/L 109   CK-MB Index Latest Ref Range: 0.0 - 4.0 % 1.0   CK - MB Latest Ref Range: <3.6 ng/ml 1.1   Troponin-I, Qt. Latest Ref Range: 0.0 - 0.045 NG/ML <0.02     X-ray left foot 1-  FINDINGS:     BONES: Osseous erosive changes at the posterior calcaneus at the level of the  Achilles insertion. No evidence of acute fracture or dislocation. Prior second  distal metatarsal and proximal phalangeal amputation.     SOFT TISSUES: Soft tissue swelling about the calcaneus with ulceration along the  plantar surface and subcutaneous gas at the level of the Achilles tendon.     Vascular calcifications.     _______________     IMPRESSION  IMPRESSION:     Osseous erosive changes of the posterior calcaneus with overlying soft tissue  swelling and skin ulceration compatible with osteomyelitis. 1-  MRI EXTREMITY ( INFECTION), ankle and hindfoot, left, without contrast     History: Swelling signs of inflammation, with ulcer, possible infection and  suspected abscess and/or osteomyelitis, for further evaluation     MR technique:     Sagittal and/or coronal T1 weighted spin echo, STIR fast spin echo, transverse/  axial T1 weighted spin echo, T2 weighted, STIR fast spin echo sequences  precontrast . No contrast administered per referring physician request,  potential limitation in evaluation of certain components of infection     No prior MR.   Comparison prior exam, plain films today .     MR FINDINGS:     There is a moderate-sized plantar and small posterior heel ulcer, both  associated with sinus tracts, local induration of soft tissue with decreased T1  and intermediate to increased T2 subcutaneous fat obliteration.     The plantar sinus tract extends into a C shaped 2 cm plantar heel complex fluid  collection containing several tiny foci of signal void presumed gas  consistent  with abscess. This tracks along the juxta periosteal plantar aspect of the  calcaneus with secondary extension into and erosion of the plantar calcaneal  cortex with extension posterosuperiorly along broad J shaped 4 cm region of the  plantar and posterior tuberosity bone. This is associated with a large  surrounding region of confluent decreased T1 and increased STIR signal marrow  edema extending into the body and neck of the calcaneus. There is associated  rupture of the lateral bundle of the plantar aponeurosis from the tuberosity  origin. Perifascial extends to central bundle with there is fusiform low signal  thickening of the aponeurosis but no discrete disruption from the calcaneus.     Posterior sinus tract extends directly into the posterior distal Achilles tendon  3 cm above the insertion. This is associated with diffuse thickening, extensive  punctate signal void, radiographically soft tissue gas within the tendon and the  adjacent paratenon and retrocalcaneal fat. This is also associated with avulsion  of the Achilles tendon with 1 cm retraction. Heterotopic ossification is present  radiographically, but no local marrow signal is evident locally, probably  secondary to edema within the marrow     Diffuse plantar muscle edema with mild atrophy is present.     MISC:  Unremarkable subtalar joint other than minimal joint effusion. Small os trigonum  of normal marrow signal. Unremarkable tarsal sinus with intact extensor  retinacula the, cervical and interosseous ligaments.     Mild degenerative arthritis with marginal osteophyte formation, ankle joint  without discrete effusion. No talocrural osteochondral defect.     Unremarkable peroneal tendons. Top normal sheath fluid volume.     Unremarkable extensor tendons. Unremarkable medial flexor tendons.  Culebra of  sheath fluid at the level of the knot of Kel [place], primarily related to the flexor  hallucis longus tendon.     Mild edema within the flexor hallucis longus and peroneus brevis muscles at the  ankle level. Diffuse cellular edema posterior knee including around the  posterior tibial neurovascular bundle.     Intact major ankle ligaments including syndesmotic ligaments. .        IMPRESSION  IMPRESSION:     1. Noncontrast exam. Plantar and posterior heel ulcers with sinus tracts, local  induration presumed granulation tissue. Local C shaped 2 cm plantar heel  complex fluid collection consistent with abscess. Secondary disruption of the  origin lateral bundle plantar aponeurosis superimposed on mild generalized  degenerative tendinosis     2. Findings consistent with extensive calcaneal posterior/plantar tuberosity and  body acute osteomyelitis.     3. Extension of posterior sinus tract gas into distal Achilles tendon. Additional insertional avulsion of the Achilles tendon from the posterior  tuberosity with 1 cm retraction, diffuse intratendinous edema and probable  superimposed degenerative tendinosis. Secondary moderate peritendinous edema  extending broadly in the pre-Achilles fat.     4. Findings consistent with flexor hallucis longus tenosynovitis primarily at  the knot of Paul. Additional mild edema or atrophy of the flexor hallucis  longus and peroneus brevis muscles.     5. Small posterior subtalar joint effusion but no definite subtalar or ankle  involvement. Mild ankle degenerative joint disease.     6.  Diffuse plantar muscle edema may reflect myositis and/or denervation atrophy

## 2020-01-29 NOTE — DIALYSIS
TREATMENT SUMMARY   Patient dialyzed in room 304  Tolerated treatment with x1 episode of hypotension ad x1 episode of nausea and vomiting    LUE AVG difficult to access on lower graft pt states hx of clots  Post HD heavy clotting noted in arterial and venous chambers of circuit. -400    500 ml removed via UF with a with a net removal 0 ml  Report given to Yvette Bergeron RN with all questions answered     TREATMENT  NOTES                                                                                                                          ACUTE HEMODIALYSIS FLOW SHEET            PATIENT INFORMATION    [address]      []? 1st Time Acute  []? Stat[x]? Routine []? Urgent []? Chronic Unit   []? Acute Room [x]? Bedside  []? ICU/CCU []?ER    Isolation Precautions: [x]? Dialysis[]? Airborne []? Contact []? Droplet []? Reverse     Special Considerations:_______  []? Blood Consent Verified  []? N/A    Allergies:[]? NKA  Allergies   Vancomycin Other (comments) Not Specified   1/29/2020     kayla syndrome       Code Status [x]? Full Code []? DNR  []? Other_____    Diet: []? Renal [x]? NPO []? Diabetic   []? Enteral Feeding []? Cardiac Diabetic: []? Yes [x]? No       [x]? Signed Treatment Consent Verified   [x]? Time Out/ Safety Check    PRIMARY NURSE REPORT: FIRST INITIAL/ LAST NAME/TITLE  PRE DIALYSIS: Yvette Bergeron RN                    TIME:  1230    ACCESS    CATHETER ACCESS: [x]? N/A  []? RIGHT  []? LEFT  []? IJ  []? SUBCL []? FEM                     []? First use X-ray  []? Tunnel     []? Non-Tunneled       []? No S/S infection  []? Redness []? Drainage  []? Cultured []? Swelling []? Pain                     []? Medical Aseptic []? Prep Dressing Changed                   []? Clotted []? Patent []? Flows: []? Good []? Poor []? Reversed                  If Access Problem Dr. Shirley Robison: []? Yes []? No    Date:_____  []? N/A[]? GRAFT/FISTULA ACCESS:  []? N/A  []? RIGHT  [x]? LEFT  [x]? UE   []? LE        [x]? AVG  []? AVF []? BUTTONHOLE    [x]? +BRUIT/THRILL [x]? MEDICAL ASEPTIC PREP      [x]? No S/S infection  []? Redness []? Drainage  []? Cultured []? Swelling []? Pain               If Access Problem Dr. Carlos Cisse: []? Yes []? No    Date:______ []? N/A    GENERAL ASSESSMENT    LUNGS:  SaO2% ____ [x]? Clear []? Coarse []? Crackles []? Wheezing                                         []? Diminished   Location: []? RLL []? LLL []? RUL []? RML []? KYLAH     COUGH:  []? Productive  []? Loose[]? Dry [x]? N/A   RESPIRATIONS: [x]? Easy []? Labored     THERAPY: [x]? RA   []? NC _____. L/min    Mask: []? NRB []? Venti  _____O2%                  []? Ventilator []? Intubated []? Trach []? BiPap []? CPap []? HI Flow    CARDIAC: [x]? Regular []? Irregular []? Pericardial Rub []? JVD               Monitored Rhythm:______ []? N/A    EDEMA: [x]? None []? Generalized []? Facial []? Pedal []? UE []? LE             []? Pitting []? 1 []? 2 []? 3 []? 4    []? Right []? Left []? Bilateral    SKIN:    [x]? Warm []? Hot []? Cold  [x]? Dry []? Pale []? Diaphoretic              []? Flushed []? Jaundiced []? Cyanotic []? Rash []? Weeping      LOC:    [x]? Alert  Oriented to: [x]? Person [x]? Place [x]? Time             []? Confused []? Lethargic []? Medicated []? Non-responsive     GI/ABDOMEN: []? Flat []? Distended [x]? Soft []? Firm []? Diarrhea [x]? Bowel Sounds Present [x]? Nausea [x]? Vomiting     PAIN: []? 0 []? 1 [x]? 2 []? 3 []? 4 []? 5 []? 6 []? 7 []? 8 []? 9 []? 10          Scale 1-10 Action/Follow Up_____    MOBILITY: []? Amb []? Amb/Assist [x]? Bed  []? Wheelchair     CURRENT LABS    HBsAg ONLY: Date Drawn: 1/16/2020            [x]? Negative []? Positive []? Unknown. HBsAb: Date Drawn: 1/16/2020             []? Susceptible <10 [x]? Immune ?10 []? Unknown    Date of Current Labs:  ATTACHED      EDUCATION   Person Educated: [x]? Patient []? Other_________   Knowledge base: []? None [x]? Minimal []? Substantial    Barriers to learning  [x]? None _______________   Preferred method of learning: []? Written [x]? Oral [x]? Visual []? Hands on    Topic: [x]? Access Care []? S&S of infection [x]? Fluid Management   []? K+  [x]? Procedural  []? Albumin []? Medications []? Tx Options   []? Transplant []? Diet []? Other    Teaching Tools: [x]? Explain [x]? Demonstration []? Handout_____ []? Video______  CARE PLAN    [x]?  Renal Failure (Adult)  Interdisciplinary  · Fluid and electrolytes stabilized  ? Interventions  · Dehydration signs and symptoms (eg: Weight/lab monitoring; vomiting/diarrhea/urine; tenting; mucous membranes; dizziness/lethargy/irritability/confusion; weak pulse; tachycardia; blood pressure; I&O)  · Fluid overload signs and symptoms assessment (eg: Body weight increased; dyspnea; edema; hypertension; respiratory crackles/wheezing; JVD; lab monitoring; mental status changes; I&O)  · Monitor appropriate lab values  · COMPLIANCE WITH PRESCRIBED THERAPY  · ARTERIAL ACCESS SITE ASSESSMENT  · NUTRITION SCREENING  · Vital signs monitoring per assessed patient condition or unit standard  · Cardiac monitoring  · Hydration management  · Intake and output measurement  · Body weight monitoring  · Skin care  · DIALYSIS  · Nutrition Care Process per nutrition screen  · Oral hygiene care every 2 hours  · Pain management     · Outcome   ? [x]? Progressing Towards Goal  ? []? Not Progressing Towards Goals  ? []? Goals Met/Resolved  ? []? Goals Not Met/ Resolved        · Patient/ Family Education  ? Progressing Towards Goals         RO/HEMODIAYLSIS MACHINE SAFETY CHECKS- BEFORE EACH TREATMENT          [x]?  [facility]: Machine Serial #1: [id number] Serial [id number]        []? [facility]: Machine Serial #2: [id number] Serial [id number]     Alarm Test: [x]? Pass  Time___1246_____  [x]? RO/Machine Log Complete    [x]?  Extracorporeal circuit Tested for integrity           Dialyzer_[id number]_________ Tubing__19I20-8__________    Dialysate: pH__7.4_  Temp.__37___Conductivity: Meter __14__ HD Machine__13.8_   CHLORINE TESTING- BEFORE EACH TREATMENT AND EVERY 4 HOURS   Total Chlorine: [x]? Less than 0.1 ppm Time:__1305___2nd Check Time:______  (If greater than 0.1 ppm from Primary then every 30 minutes from Secondary)   TREATMENT INIATION-WITH DIALYSIS PRECAUTIONS   [x]? All Connections Secured   [x]? Saline Line Double Clamped    [x]? Venous Parameters Set [x]? Arterial Parameters Set    [x]? Prime Given 250 ml     [x]? Air Foam Detector Engaged   PRE-TREATMENT   UF Calculations: Wt to lose:_0___ml(+) Oral:_0_ml(+)IV Meds/Fluids/Blood prods_0__ml(+) Prime/Rinse__500_ml(=)Total UF Goal_0_mL   Scale Type:[x]? Bed scale []? Sling Scale []? Wheel Chair Scale []? Not Ordered []? []? Unable to obtain pt on stretcher/ bed scale malfunctioning  _____[x]? kg []? lbs   Tx Initiation Note: RECEIVED REPORT. PATIENT ALERT AND ORIENTED. VITAL SIGNS WNL. NAD. ALL QUESTIONS ANSWERED WITH PATIENT  SAFETY CHECKS COMPLETE. TIME OUT COMPLETE. TREATMENT INITIATED VIA __AVG___. NO CONCERNS NOTED. [x]? Time Out/Safety Check  Time:_1305____   INTRADIALYTIC MONITORING  (SEE ATTACHED FLOWSHEET)      POST TREATMENT    Time Medication Dose Volume Route    Initials   NA                                                                     [facility] Signatures Title Initials Time   STEPHEN ZHOU RN PAP 1800                       Dialyzer cleared: [x]? Good []? Fair []? Poor     Blood Volume Processed ____L   Net UF Removed __0__mL  Post Tx Access:                  AVF/AVG: Bleeding Stop Time      Art. 6_min Chaim._6_NA_min []? +bruit/thrill                              Catheter: Locking Solution  []?  Heparin 1 ml/1000 units                                                             []? Normal Saline                                                                      Art._____ ml Chaim._____ml [x]? New caps placed  Post Assessment:              Skin: [x]? Warm [x]? Dry []? Diaphoretic []? Flushed []? Pale []? Cyanotic            Lungs: [x]? Clear []? Coarse []? Crackles []? Wheezing             Cardiac: [x]? Regular []? Irregular  []? Monitored rhythm____ []?N/A            Edema: [x]? None []? General []? Facial []? Pedal  []?UE []?LE []? RIGHT []? LEFT            Pain: [x]?0 []?1 []?2 []?3 []?4 []?5 []?6 []?7 []?8 []?9 []?10   POST Tx Note:TREATMENT COMPLETED. ALL POSSIBLE BLOOD RETURNED. PT TOLERATED WELL. VITAL SIGNS WNL  FOR PATIENT. NAD. DE CANULATED ARTERIAL AND VENOUS SITES WITHOUT INCIDENT. REPORT GIVEN WITH OPPORTUNITY TO ADDRESS ANY CONCERNS OR QUESTTIONS AND PATIENT STAYS ROOM IN BED WITHOUT DISTRESS OR ACUTE DISCOMFORT. BED LOWED, CALL BELL WITHIN REACH .    Primary Nurse Report: First initial/Last name/Title    Post Dialysis:__ Chase Ruelas RN_____         Time:___1745_____________    Abbreviations: AVG-arterial venous graft, AVF-arterial venous fistula, IJ-Internal Jugular,  Subcl-Subclavian, Fem-Femoral, Tx-treatment, AP/HR-apical heart rate, DFR-dialysate flow rate, BFR-blood flow rate, AP-arterial pressure, -venous pressure, UF-ultrafiltrate, TMP-transmembrane pressure, Chaim-Venous, Art-Arterial, RO-Reverse Osmosis         Previous addendum made in error

## 2020-01-29 NOTE — PROGRESS NOTES
Reason for Admission:  left foot diabetic foot ulcer with osteomyelitis                    RUR Score:    15%                 Plan for utilizing home health:    TBD                      Current Advanced Directive/Advance Care Plan:  Not on file                         Transition of Care Plan:  HH and IVABX vs OPIC vs SNF                    Asmita reviewed. Per H&P \"Danny Varela is a 54 y.o. male with PMHX of history of end-stage renal disease on HD, HTN , heart failure came to ER due to nausea /vomiting and abnormal pain since last Saturday. He did miss his dialyzed due to nausea or vomiting. CT abdomen indicated  possible esophagitis. He also noted he is  left ankle became swelling and \" something pop up with the swelling\" yesterday. He did not see any  physician for his ankle. He denies any fever chills. In ER his white count was  12 K, his K4.6. Creatinine 14.2. He was giving Levaquin /clindamycin and Zosyn per ER physician. Nephrology and podiatry has been called. \"      Noted plan surgical intervention by podiatry tomorrow afternoon. Given pt with osteomyelitis, a request has been sent to [employer] for a cost out with potential copay,  in the event pt will require out pt IVABX. Please consider ordering therapy services following surgical intervention when appropriate to assist with an appropriate transition of care. CM to continue to follow and assist.     Care Management Interventions  PCP Verified by CM: Yes  Mode of Transport at Discharge:  Other (see comment)(Family)  Transition of Care Consult (CM Consult): Discharge Planning  Health Maintenance Reviewed: Yes  Current Support Network: [facility] [place]

## 2020-01-30 ENCOUNTER — APPOINTMENT (OUTPATIENT)
Dept: GENERAL RADIOLOGY | Age: 50
DRG: 853 | End: 2020-01-30
Attending: PODIATRIST
Payer: MEDICARE

## 2020-01-30 ENCOUNTER — ANESTHESIA (OUTPATIENT)
Dept: SURGERY | Age: 50
DRG: 853 | End: 2020-01-30
Payer: MEDICARE

## 2020-01-30 PROBLEM — E16.2 HYPOGLYCEMIA: Status: ACTIVE | Noted: 2020-01-30

## 2020-01-30 LAB
ANION GAP SERPL CALC-SCNC: 8 MMOL/L (ref 3–18)
BASOPHILS # BLD: 0 K/UL (ref 0–0.1)
BASOPHILS NFR BLD: 0 % (ref 0–3)
BUN SERPL-MCNC: 35 MG/DL (ref 7–18)
BUN/CREAT SERPL: 4 (ref 12–20)
CALCIUM SERPL-MCNC: 7.9 MG/DL (ref 8.5–10.1)
CHLORIDE SERPL-SCNC: 104 MMOL/L (ref 100–111)
CO2 SERPL-SCNC: 30 MMOL/L (ref 21–32)
CREAT SERPL-MCNC: 8.05 MG/DL (ref 0.6–1.3)
DIFFERENTIAL METHOD BLD: ABNORMAL
EOSINOPHIL # BLD: 0.1 K/UL (ref 0–0.4)
EOSINOPHIL NFR BLD: 0 % (ref 0–5)
ERYTHROCYTE [DISTWIDTH] IN BLOOD BY AUTOMATED COUNT: 19.9 % (ref 11.6–14.5)
GLUCOSE BLD STRIP.AUTO-MCNC: 120 MG/DL (ref 70–110)
GLUCOSE BLD STRIP.AUTO-MCNC: 40 MG/DL (ref 70–110)
GLUCOSE BLD STRIP.AUTO-MCNC: 44 MG/DL (ref 70–110)
GLUCOSE BLD STRIP.AUTO-MCNC: 44 MG/DL (ref 70–110)
GLUCOSE BLD STRIP.AUTO-MCNC: 49 MG/DL (ref 70–110)
GLUCOSE BLD STRIP.AUTO-MCNC: 49 MG/DL (ref 70–110)
GLUCOSE BLD STRIP.AUTO-MCNC: 53 MG/DL (ref 70–110)
GLUCOSE BLD STRIP.AUTO-MCNC: 64 MG/DL (ref 70–110)
GLUCOSE BLD STRIP.AUTO-MCNC: 67 MG/DL (ref 70–110)
GLUCOSE BLD STRIP.AUTO-MCNC: 74 MG/DL (ref 70–110)
GLUCOSE BLD STRIP.AUTO-MCNC: 76 MG/DL (ref 70–110)
GLUCOSE BLD STRIP.AUTO-MCNC: 93 MG/DL (ref 70–110)
GLUCOSE BLD STRIP.AUTO-MCNC: 93 MG/DL (ref 70–110)
GLUCOSE SERPL-MCNC: 39 MG/DL (ref 74–99)
HBV SURFACE AB SER QL IA: POSITIVE
HBV SURFACE AB SERPL IA-ACNC: 41.9 MIU/ML
HBV SURFACE AG SER QL: <0.1 INDEX
HBV SURFACE AG SER QL: NEGATIVE
HCT VFR BLD AUTO: 32.2 % (ref 36–48)
HEP BS AB COMMENT,HBSAC: NORMAL
HGB BLD-MCNC: 9.8 G/DL (ref 13–16)
LYMPHOCYTES # BLD: 0.4 K/UL (ref 0.9–3.6)
LYMPHOCYTES NFR BLD: 5 % (ref 20–51)
MAGNESIUM SERPL-MCNC: 2 MG/DL (ref 1.6–2.6)
MCH RBC QN AUTO: 24.3 PG (ref 24–34)
MCHC RBC AUTO-ENTMCNC: 30.4 G/DL (ref 31–37)
MCV RBC AUTO: 79.7 FL (ref 74–97)
MONOCYTES # BLD: 0.8 K/UL (ref 0.05–1.2)
MONOCYTES NFR BLD: 4 % (ref 2–9)
NEUTS BAND NFR BLD MANUAL: 3 % (ref 0–5)
NEUTS SEG # BLD: 22 K/UL (ref 1.8–8)
NEUTS SEG NFR BLD: 88 % (ref 42–75)
PLATELET # BLD AUTO: 212 K/UL (ref 135–420)
PMV BLD AUTO: 9.5 FL (ref 9.2–11.8)
POTASSIUM SERPL-SCNC: 3.9 MMOL/L (ref 3.5–5.5)
RBC # BLD AUTO: 4.04 M/UL (ref 4.7–5.5)
RBC MORPH BLD: ABNORMAL
SODIUM SERPL-SCNC: 142 MMOL/L (ref 136–145)
WBC # BLD AUTO: 23.3 K/UL (ref 4.6–13.2)
WBC MORPH BLD: ABNORMAL

## 2020-01-30 PROCEDURE — 0QBM0ZZ EXCISION OF LEFT TARSAL, OPEN APPROACH: ICD-10-PCS | Performed by: PODIATRIST

## 2020-01-30 PROCEDURE — 88311 DECALCIFY TISSUE: CPT

## 2020-01-30 PROCEDURE — 87070 CULTURE OTHR SPECIMN AEROBIC: CPT

## 2020-01-30 PROCEDURE — 88307 TISSUE EXAM BY PATHOLOGIST: CPT

## 2020-01-30 PROCEDURE — 87101 SKIN FUNGI CULTURE: CPT

## 2020-01-30 PROCEDURE — 74011250636 HC RX REV CODE- 250/636

## 2020-01-30 PROCEDURE — 74011250636 HC RX REV CODE- 250/636: Performed by: HOSPITALIST

## 2020-01-30 PROCEDURE — 76010000131 HC OR TIME 2 TO 2.5 HR: Performed by: PODIATRIST

## 2020-01-30 PROCEDURE — 74011250637 HC RX REV CODE- 250/637: Performed by: HOSPITALIST

## 2020-01-30 PROCEDURE — 0QBM0ZX EXCISION OF LEFT TARSAL, OPEN APPROACH, DIAGNOSTIC: ICD-10-PCS | Performed by: PODIATRIST

## 2020-01-30 PROCEDURE — 74011000258 HC RX REV CODE- 258: Performed by: HOSPITALIST

## 2020-01-30 PROCEDURE — 74011250636 HC RX REV CODE- 250/636: Performed by: FAMILY MEDICINE

## 2020-01-30 PROCEDURE — 74011000250 HC RX REV CODE- 250: Performed by: NURSE ANESTHETIST, CERTIFIED REGISTERED

## 2020-01-30 PROCEDURE — 77030006788 HC BLD SAW OSC STRY -B: Performed by: PODIATRIST

## 2020-01-30 PROCEDURE — 77030018836 HC SOL IRR NACL ICUM -A: Performed by: PODIATRIST

## 2020-01-30 PROCEDURE — 36415 COLL VENOUS BLD VENIPUNCTURE: CPT

## 2020-01-30 PROCEDURE — 77030019895 HC PCKNG STRP IODO -A: Performed by: PODIATRIST

## 2020-01-30 PROCEDURE — 80048 BASIC METABOLIC PNL TOTAL CA: CPT

## 2020-01-30 PROCEDURE — 77030040361 HC SLV COMPR DVT MDII -B: Performed by: PODIATRIST

## 2020-01-30 PROCEDURE — 88304 TISSUE EXAM BY PATHOLOGIST: CPT

## 2020-01-30 PROCEDURE — 76060000035 HC ANESTHESIA 2 TO 2.5 HR: Performed by: PODIATRIST

## 2020-01-30 PROCEDURE — 74011250636 HC RX REV CODE- 250/636: Performed by: SPECIALIST

## 2020-01-30 PROCEDURE — 73650 X-RAY EXAM OF HEEL: CPT

## 2020-01-30 PROCEDURE — 77030040195: Performed by: PODIATRIST

## 2020-01-30 PROCEDURE — 83735 ASSAY OF MAGNESIUM: CPT

## 2020-01-30 PROCEDURE — 77030002916 HC SUT ETHLN J&J -A: Performed by: PODIATRIST

## 2020-01-30 PROCEDURE — 74011250636 HC RX REV CODE- 250/636: Performed by: NURSE ANESTHETIST, CERTIFIED REGISTERED

## 2020-01-30 PROCEDURE — 83036 HEMOGLOBIN GLYCOSYLATED A1C: CPT

## 2020-01-30 PROCEDURE — 87075 CULTR BACTERIA EXCEPT BLOOD: CPT

## 2020-01-30 PROCEDURE — 87077 CULTURE AEROBIC IDENTIFY: CPT

## 2020-01-30 PROCEDURE — 77030020782 HC GWN BAIR PAWS FLX 3M -B: Performed by: PODIATRIST

## 2020-01-30 PROCEDURE — 74011000250 HC RX REV CODE- 250: Performed by: PODIATRIST

## 2020-01-30 PROCEDURE — 77030038010: Performed by: PODIATRIST

## 2020-01-30 PROCEDURE — 87116 MYCOBACTERIA CULTURE: CPT

## 2020-01-30 PROCEDURE — 74011250636 HC RX REV CODE- 250/636: Performed by: PODIATRIST

## 2020-01-30 PROCEDURE — 87186 SC STD MICRODIL/AGAR DIL: CPT

## 2020-01-30 PROCEDURE — 77030040506 HC DRN WND MDII -A: Performed by: PODIATRIST

## 2020-01-30 PROCEDURE — 85025 COMPLETE CBC W/AUTO DIFF WBC: CPT

## 2020-01-30 PROCEDURE — 65270000029 HC RM PRIVATE

## 2020-01-30 PROCEDURE — C9113 INJ PANTOPRAZOLE SODIUM, VIA: HCPCS | Performed by: HOSPITALIST

## 2020-01-30 PROCEDURE — 82962 GLUCOSE BLOOD TEST: CPT

## 2020-01-30 PROCEDURE — 74011000250 HC RX REV CODE- 250: Performed by: HOSPITALIST

## 2020-01-30 RX ORDER — GLYCOPYRROLATE 0.2 MG/ML
INJECTION INTRAMUSCULAR; INTRAVENOUS AS NEEDED
Status: DISCONTINUED | OUTPATIENT
Start: 2020-01-30 | End: 2020-01-30 | Stop reason: HOSPADM

## 2020-01-30 RX ORDER — HYDROMORPHONE HYDROCHLORIDE 2 MG/1
2 TABLET ORAL
Status: DISCONTINUED | OUTPATIENT
Start: 2020-01-30 | End: 2020-02-05 | Stop reason: HOSPADM

## 2020-01-30 RX ORDER — INSULIN LISPRO 100 [IU]/ML
INJECTION, SOLUTION INTRAVENOUS; SUBCUTANEOUS
Status: DISCONTINUED | OUTPATIENT
Start: 2020-01-30 | End: 2020-02-02

## 2020-01-30 RX ORDER — DEXTROSE MONOHYDRATE AND SODIUM CHLORIDE 5; .45 G/100ML; G/100ML
75 INJECTION, SOLUTION INTRAVENOUS CONTINUOUS
Status: DISCONTINUED | OUTPATIENT
Start: 2020-01-30 | End: 2020-01-30 | Stop reason: ALTCHOICE

## 2020-01-30 RX ORDER — HYDROMORPHONE HYDROCHLORIDE 1 MG/ML
1 INJECTION, SOLUTION INTRAMUSCULAR; INTRAVENOUS; SUBCUTANEOUS
Status: DISCONTINUED | OUTPATIENT
Start: 2020-01-30 | End: 2020-02-05 | Stop reason: HOSPADM

## 2020-01-30 RX ORDER — LIDOCAINE HYDROCHLORIDE 10 MG/ML
INJECTION INFILTRATION; PERINEURAL AS NEEDED
Status: DISCONTINUED | OUTPATIENT
Start: 2020-01-30 | End: 2020-01-30 | Stop reason: HOSPADM

## 2020-01-30 RX ORDER — MORPHINE SULFATE 10 MG/ML
4 INJECTION, SOLUTION INTRAMUSCULAR; INTRAVENOUS
Status: DISCONTINUED | OUTPATIENT
Start: 2020-01-30 | End: 2020-02-05 | Stop reason: HOSPADM

## 2020-01-30 RX ORDER — MIDAZOLAM HYDROCHLORIDE 1 MG/ML
INJECTION, SOLUTION INTRAMUSCULAR; INTRAVENOUS AS NEEDED
Status: DISCONTINUED | OUTPATIENT
Start: 2020-01-30 | End: 2020-01-30 | Stop reason: HOSPADM

## 2020-01-30 RX ORDER — NALOXONE HYDROCHLORIDE 0.4 MG/ML
0.4 INJECTION, SOLUTION INTRAMUSCULAR; INTRAVENOUS; SUBCUTANEOUS AS NEEDED
Status: DISCONTINUED | OUTPATIENT
Start: 2020-01-30 | End: 2020-02-05 | Stop reason: HOSPADM

## 2020-01-30 RX ORDER — PROPOFOL 10 MG/ML
INJECTION, EMULSION INTRAVENOUS AS NEEDED
Status: DISCONTINUED | OUTPATIENT
Start: 2020-01-30 | End: 2020-01-30 | Stop reason: HOSPADM

## 2020-01-30 RX ORDER — SODIUM CHLORIDE 9 MG/ML
500 INJECTION, SOLUTION INTRAVENOUS CONTINUOUS
Status: DISCONTINUED | OUTPATIENT
Start: 2020-01-30 | End: 2020-01-31

## 2020-01-30 RX ORDER — PHENYLEPHRINE HCL IN 0.9% NACL 1 MG/10 ML
SYRINGE (ML) INTRAVENOUS AS NEEDED
Status: DISCONTINUED | OUTPATIENT
Start: 2020-01-30 | End: 2020-01-30 | Stop reason: HOSPADM

## 2020-01-30 RX ORDER — ACETAMINOPHEN 325 MG/1
650 TABLET ORAL EVERY 6 HOURS
Status: DISCONTINUED | OUTPATIENT
Start: 2020-01-31 | End: 2020-02-05 | Stop reason: HOSPADM

## 2020-01-30 RX ORDER — SODIUM CHLORIDE 0.9 % (FLUSH) 0.9 %
5-40 SYRINGE (ML) INJECTION AS NEEDED
Status: DISCONTINUED | OUTPATIENT
Start: 2020-01-30 | End: 2020-02-05 | Stop reason: HOSPADM

## 2020-01-30 RX ORDER — BUPIVACAINE HYDROCHLORIDE 5 MG/ML
INJECTION, SOLUTION EPIDURAL; INTRACAUDAL AS NEEDED
Status: DISCONTINUED | OUTPATIENT
Start: 2020-01-30 | End: 2020-01-30 | Stop reason: HOSPADM

## 2020-01-30 RX ORDER — SODIUM CHLORIDE 0.9 % (FLUSH) 0.9 %
5-40 SYRINGE (ML) INJECTION EVERY 8 HOURS
Status: DISCONTINUED | OUTPATIENT
Start: 2020-01-30 | End: 2020-02-05 | Stop reason: HOSPADM

## 2020-01-30 RX ADMIN — SODIUM CHLORIDE 500 ML: 900 INJECTION, SOLUTION INTRAVENOUS at 13:10

## 2020-01-30 RX ADMIN — Medication 100 MCG: at 14:27

## 2020-01-30 RX ADMIN — HEPARIN SODIUM 5000 UNITS: 5000 INJECTION INTRAVENOUS; SUBCUTANEOUS at 16:12

## 2020-01-30 RX ADMIN — MORPHINE SULFATE 4 MG: 10 INJECTION INTRAVENOUS at 01:50

## 2020-01-30 RX ADMIN — SODIUM CHLORIDE: 234 INJECTION INTRAMUSCULAR; INTRAVENOUS; SUBCUTANEOUS at 16:12

## 2020-01-30 RX ADMIN — Medication 100 MCG: at 14:45

## 2020-01-30 RX ADMIN — GLYCOPYRROLATE 0.2 MG: 0.2 INJECTION INTRAMUSCULAR; INTRAVENOUS at 13:37

## 2020-01-30 RX ADMIN — CLINDAMYCIN PHOSPHATE 600 MG: 600 INJECTION, SOLUTION INTRAVENOUS at 07:59

## 2020-01-30 RX ADMIN — GLUCAGON HYDROCHLORIDE: KIT at 08:34

## 2020-01-30 RX ADMIN — CLINDAMYCIN PHOSPHATE 600 MG: 600 INJECTION, SOLUTION INTRAVENOUS at 22:12

## 2020-01-30 RX ADMIN — CLINDAMYCIN PHOSPHATE 600 MG: 600 INJECTION, SOLUTION INTRAVENOUS at 16:14

## 2020-01-30 RX ADMIN — ONDANSETRON 4 MG: 2 INJECTION INTRAMUSCULAR; INTRAVENOUS at 01:50

## 2020-01-30 RX ADMIN — MORPHINE SULFATE 4 MG: 10 INJECTION INTRAVENOUS at 19:16

## 2020-01-30 RX ADMIN — PIPERACILLIN AND TAZOBACTAM 2.25 G: 2; .25 INJECTION, POWDER, LYOPHILIZED, FOR SOLUTION INTRAVENOUS at 10:06

## 2020-01-30 RX ADMIN — SODIUM CHLORIDE 40 MG: 9 INJECTION, SOLUTION INTRAMUSCULAR; INTRAVENOUS; SUBCUTANEOUS at 10:06

## 2020-01-30 RX ADMIN — PIPERACILLIN AND TAZOBACTAM 2.25 G: 2; .25 INJECTION, POWDER, LYOPHILIZED, FOR SOLUTION INTRAVENOUS at 00:53

## 2020-01-30 RX ADMIN — DEXTROSE MONOHYDRATE AND SODIUM CHLORIDE 75 ML/HR: 5; .45 INJECTION, SOLUTION INTRAVENOUS at 07:59

## 2020-01-30 RX ADMIN — MORPHINE SULFATE 4 MG: 10 INJECTION INTRAVENOUS at 16:11

## 2020-01-30 RX ADMIN — PROPOFOL 40 MG: 10 INJECTION, EMULSION INTRAVENOUS at 13:48

## 2020-01-30 RX ADMIN — MIDAZOLAM 2 MG: 1 INJECTION INTRAMUSCULAR; INTRAVENOUS at 13:37

## 2020-01-30 RX ADMIN — PIPERACILLIN AND TAZOBACTAM 2.25 G: 2; .25 INJECTION, POWDER, LYOPHILIZED, FOR SOLUTION INTRAVENOUS at 17:13

## 2020-01-30 RX ADMIN — Medication 200 MCG: at 13:56

## 2020-01-30 RX ADMIN — Medication 10 ML: at 21:27

## 2020-01-30 RX ADMIN — Medication 100 MCG: at 15:03

## 2020-01-30 NOTE — PROGRESS NOTES
Hospitalist Progress Note-critical care note     Patient: Tony Alvarado. MRN: [id number]  CSN: [id number]    YOB: 1970  Age: 54 y.o. Sex: male    DOA: 1/29/2020 LOS:  LOS: 1 day            Chief complaint: hypoglycemia, esrd on HD, cellulitis and abscess of foot. HTN     Assessment/Plan         Hospital Problems  Date Reviewed: 1/30/2020          Codes Class Noted POA    Hypoglycemia ICD-10-CM: E16.2  ICD-9-CM: 251.2  1/30/2020 Unknown        Dehydration ICD-10-CM: E86.0  ICD-9-CM: 276.51  1/29/2020 Yes        ESRD on hemodialysis [facility]) ICD-10-CM: N18.6, Z99.2  ICD-9-CM: 585.6, V45.11  1/29/2020 Yes        * (Principal) Cellulitis and abscess of foot ICD-10-CM: L03.119, L02.619  ICD-9-CM: 682.7  1/29/2020 Yes        HTN (hypertension) ICD-10-CM: I10  ICD-9-CM: 401.9  1/29/2020 Yes        Acute osteomyelitis ([facility] ICD-10-CM: M86.10  ICD-9-CM: 730.00  1/29/2020 Yes        Hx of BKA, right ([facility] ICD-10-CM: Z89.511  ICD-9-CM: V49.75  1/29/2020 Yes        Esophagitis ICD-10-CM: K20.9  ICD-9-CM: 530.10  1/29/2020 Unknown        Anemia of chronic disease ICD-10-CM: D63.8  ICD-9-CM: 285.29  2/28/2018 Yes              Cellulitis and abscess of the foot, acute osteomyelitis  Will have debridement, bone biopsy     Zosyn , levaquin and  clindamycin  Will have ID on board tomorrow   Wound cx final report still pending      Dehydration  Change to d5-then d10     End-stage renal disease on HD  Scheduled for HD Tuesday Thursday and Saturday,  Received HD yesterday        Hypertension:-hold bp meds due to hypotension-questions about if compliance medication at home   Continue monitoring      Right BKA  Fall precaution.     Chronic anemia due to end-stage renal disease  Continue monitor H&H     Esophagitis   ppi      Hypoglycemia   Due to npo  Hypoglycemia protocol, d5 ns infusion- change to d10       Subjective: the wipe burned my skin     RN :glucose low .      Disposition :tbd,   Review of systems:    General: No fevers or chills. Cardiovascular: No chest pain or pressure. No palpitations. Pulmonary: No shortness of breath. Gastrointestinal: No nausea, vomiting. Vital signs/Intake and Output:  Visit Vitals  BP (!) 89/55 (BP 1 Location: Right arm, BP Patient Position: At rest)   Pulse 89   Temp 97.8 °F (36.6 °C)   Resp 15   Ht 5' 7\" (1.702 m)   Wt 78.4 kg (172 lb 12.8 oz)   SpO2 98%   BMI 27.06 kg/m²     Current Shift:  01/30 0701 - 01/30 1900  In: 810 [P.O.:660; I.V.:150]  Out: -   Last three shifts:  01/28 1901 - 01/30 0700  In: 935 [I.V.:935]  Out: 0     Physical Exam:  General: WD, WN. Alert, cooperative, no acute distress    HEENT: NC, Atraumatic. PERRLA, anicteric sclerae. Lungs: CTA Bilaterally. No Wheezing/Rhonchi/Rales. Heart:  Regular  rhythm,  No murmur, No Rubs, No Gallops  Abdomen: Soft, Non distended, Non tender.  +Bowel sounds,   Extremities: No c/c. Rt bka, Left foot ulcer and open wound at ankle , swelling of foot   Psych:   Not anxious or agitated. Neurologic:  No acute neurological deficit. Labs: Results:       Chemistry Recent Labs     01/30/20  0500 01/29/20  0446   GLU 39* 93    136   K 3.9 4.6    89*   CO2 30 32   BUN 35* 84*   CREA 8.05* 14.20*   CA 7.9* 8.8   AGAP 8 15   BUCR 4* 6*   AP  --  83   TP  --  9.8*   ALB  --  2.3*   GLOB  --  7.5*   AGRAT  --  0.3*      CBC w/Diff Recent Labs     01/30/20  0500 01/29/20  0355   WBC 23.3* 12.0   RBC 4.04* 5.24   HGB 9.8* 12.9*   HCT 32.2* 40.2    356   GRANS 88* 79*   LYMPH 5* 10*   EOS 0 1      Cardiac Enzymes Recent Labs     01/29/20  0446      CKND1 1.0      Coagulation No results for input(s): PTP, INR, APTT, INREXT in the last 72 hours. Lipid Panel No results found for: CHOL, CHOLPOCT, CHOLX, CHLST, CHOLV, 798195, HDL, HDLP, LDL, LDLC, DLDLP, 452408, VLDLC, VLDL, TGLX, TRIGL, TRIGP, TGLPOCT, CHHD, CHHDX   BNP No results for input(s): BNPP in the last 72 hours.    Liver Enzymes Recent Labs     01/29/20  0446   TP 9.8*   ALB 2.3*   AP 83   SGOT 30      Thyroid Studies No results found for: T4, T3U, TSH, TSHEXT     Procedures/imaging: see electronic medical records for all procedures/Xrays and details which were not copied into this note but were reviewed prior to creation of Jose Puente MD

## 2020-01-30 NOTE — ROUTINE PROCESS
TRANSFER - OUT REPORT:    Verbal report given to ANNA Blair(name) on Marlin Miner.  being transferred to OR(unit) for ordered procedure       Report consisted of patients Situation, Background, Assessment and   Recommendations(SBAR). Information from the following report(s) SBAR, Kardex, Intake/Output and MAR was reviewed with the receiving nurse. Lines:   Peripheral IV 01/30/20 Posterior;Right Hand (Active)   Site Assessment Clean, dry, & intact 1/30/2020  1:09 PM   Phlebitis Assessment 0 1/30/2020  1:09 PM   Infiltration Assessment 0 1/30/2020  1:09 PM   Dressing Status Clean, dry, & intact 1/30/2020  1:09 PM   Dressing Type Transparent 1/30/2020  1:09 PM   Hub Color/Line Status Pink 1/30/2020  1:09 PM        Opportunity for questions and clarification was provided.       Patient transported with:   [employer]

## 2020-01-30 NOTE — PROGRESS NOTES
Bedside and Verbal shift change report given to LAST Tovar RN (oncoming nurse) by Tere Osuna RN (offgoing nurse). Report included the following information SBAR, Kardex, Intake/Output and MAR. Patient A/OX4. Patient in bed resting quietly. No complaints of pain at this time. Critical lab result of blood glucose of 39. Assessed patient. POC blood glucose of 49 and 40. Administered 2 apple juices and 1 orange juice with sugar. 0800- changed fluids to Dextrose 5%-0.45%NaCL at 75 ML/HR.       0834-Recheck of blood glucose of 49 and 53. Night shift nurse administered glucagon. 0853-POC blood glucose of 67. No juice given per Erwin Lombard. Recheck in 15 minutes. 0918-Blood glucose 74. Will continue to monitor. 1004-Blood glucose 64. Dr. Shannan Campbell would like Dextrose 10%-0.45% NaCL at 75 ML/HR. Hung Dextrose 10% until other fluids available from pharmacy. 1006-Held Coreg Blood pressure 109/65. OR called to state they will  the patient around 12 for surgery on L foot. Prepro checklist and Do Not Roll sheet completed. Dextrose 10% 0.45NaCL,  did not come up from pharmacy. Sent with Straight dextrose 10% physician aware and ok. IV machine placed on pole on bed, running at 75 ML/HR.     1220-Patient taken down to OR    1230-Report called to ANNA Blair in OR. Waited to call report until fluid situation was straightened out. 1500-Received verbal report from Carmen Giordano. 1553-Patient arrived back on unit. 1700-Held Coreg Blood pressure 90/83    1610-Patient requesting something for pain. Administered  Morphine 4MG IV. Patient stating his body is sore all over, he believes its from the [facility] wipes. 1915-Patient requesting something for pain. Administered Morphine 4MG IV.     Patient Vitals for the past 12 hrs:   Temp Pulse Resp BP SpO2   01/30/20 1545 97.7 °F (36.5 °C) (!) 103 16 90/83 98 %   01/30/20 1514 -- 77 15 99/56 98 %   01/30/20 1233 97.8 °F (36.6 °C) 89 15 (!) 89/55 98 %   01/30/20 1122 97.5 °F (36.4 °C) 93 16 91/51 100 %   01/30/20 0830 97.4 °F (36.3 °C) 94 16 109/65 99 %     Bedside and Verbal shift change report given to Magi Morgan (oncoming nurse) by Jannet Botello RN (offgoing nurse). Report included the following information SBAR, Kardex, Intake/Output and MAR.

## 2020-01-30 NOTE — ANESTHESIA PREPROCEDURE EVALUATION
Relevant Problems   No relevant active problems       Anesthetic History   No history of anesthetic complications            Review of Systems / Medical History  Patient summary reviewed, nursing notes reviewed and pertinent labs reviewed    Pulmonary  Within defined limits                 Neuro/Psych   Within defined limits           Cardiovascular    Hypertension              Exercise tolerance: >4 METS     GI/Hepatic/Renal         Renal disease: ESRD and dialysis    Pertinent negatives: No GERD   Endo/Other    Diabetes: type 2, using insulin         Other Findings              Physical Exam    Airway  Mallampati: II  TM Distance: 4 - 6 cm  Neck ROM: normal range of motion   Mouth opening: Normal     Cardiovascular               Dental    Dentition: Poor dentition     Pulmonary                 Abdominal         Other Findings            Anesthetic Plan    ASA: 4  Anesthesia type: MAC            Anesthetic plan and risks discussed with: Patient      BP low/lethargic - will inform surgeon - at most, light MAC only. No beta blocker secondary to hypotension.

## 2020-01-30 NOTE — PROGRESS NOTES
Chart reviewed. Pt admitted to medical by hospitalist.  Attempted to see patient at bedside and pt off unit. Pt being followed by podiatry MD Sheila Topete. Provider please consider PT/OT eval orders after surgical intervention to assist with dc planning. Provider please advise if pt will require long term IV abx at time of discharge. CM will follow for transition of care needs,     Care Management Interventions  PCP Verified by CM: Yes  Mode of Transport at Discharge:  Other (see comment)(Family)  Transition of Care Consult (CM Consult): Discharge Planning  Health Maintenance Reviewed: Yes  Current Support Network: [facility] [place]

## 2020-01-30 NOTE — PROGRESS NOTES
Bedside and Verbal shift change report given to ELEUTERIO Nazario RN by Yasir Jalloh RN. Report included the following information SBAR, Kardex, OR Summary, Intake/Output and MAR.

## 2020-01-30 NOTE — PERIOP NOTES
Reviewed PTA medication list with patient/caregiver and patient/caregiver denies any additional medications. Patient admits to having a responsible adult care for them at home for at least 24 hours after surgery. Patient encouraged to use gown warming system and informed that using said warming gown to regulate body temperature prior to a procedure has been shown to help reduce the risks of blood clots and infection. Dual skin assessment & fall risk band verification completed with John Trujillo RN. Skin assessment concluded that patient has golf sized wound on left heel. Moderate amount of drainage from wound. No dressing covering wound upon arrival, wound left open to air.

## 2020-01-30 NOTE — ROUTINE PROCESS
TRANSFER - IN REPORT:    Verbal report received from NAHEED Quigley(name) on Rebecca Avalos.  being received from PACU/OR(unit) for routine post - op      Report consisted of patients Situation, Background, Assessment and   Recommendations(SBAR). Information from the following report(s) SBAR, Kardex, Intake/Output and MAR was reviewed with the receiving nurse. Opportunity for questions and clarification was provided. Assessment completed upon patients arrival to unit and care assumed.

## 2020-01-30 NOTE — PERIOP NOTES
Dr. Chucho Sharpe notified of sepsis warning upon opening chart based on patient's current vital signs. Past vitals given as well. Current symptoms nausea and vomiting reported to MD. He stated he is currently parking and will be up to assess the patient. Upon arrival clean gown and linen applied to bed and on patient.  IV was not running upon arrival.

## 2020-01-30 NOTE — PROGRESS NOTES
Problem: Falls - Risk of  Goal: *Absence of Falls  Description  Document Mot President Fall Risk and appropriate interventions in the flowsheet.   Outcome: Progressing Towards Goal  Note: Fall Risk Interventions:  Mobility Interventions: Communicate number of staff needed for ambulation/transfer         Medication Interventions: Teach patient to arise slowly    Elimination Interventions: Call light in reach

## 2020-01-30 NOTE — PERIOP NOTES
Clarified antibiotic with Dr. Nusrat Landeros. No new orders at this time. Cultures will be drawn during procedure. Heparin is continued to be held.

## 2020-01-30 NOTE — PROGRESS NOTES
Shift summary  Patient had  A hypoglycemic episode,BGS 39.MD paged,notified of pt ESRD status,orders given to switch fluids from n/s to D5%. 45 NS at 75 ml/hr,and administer juice,8 ounces given but BGS still in the 40's another 8 ounces given,but still 49. Consulted with the unit manager and per nurses protocol glucagon given. Subsequent BGS up to 67,the oncoming nurse VISHAL Roe notified for follow up. Blood pressure 109/65, pulse 94, temperature 97.4 °F (36.3 °C), resp. rate 16, height 5' 7\" (1.702 m), weight 83.9 kg (185 lb), SpO2 99 %. Bedside, Verbal and Written shift change report given to VISHAL Roe RN (oncoming nurse) by Santa Cha RN   (offgoing nurse). Report included the following information SBAR, Kardex and MAR.

## 2020-01-30 NOTE — PERIOP NOTES
TRANSFER - IN REPORT:    Verbal report received from VISHAL Bender RN(name) on Trellis Cam.  being received from  304(unit) for routine progression of care      Report consisted of patients Situation, Background, Assessment and   Recommendations(SBAR). Information from the following report(s) SBAR, Intake/Output and MAR was reviewed with the receiving nurse. Opportunity for questions and clarification was provided. Assessment completed upon patients arrival to unit and care assumed.

## 2020-01-30 NOTE — ANESTHESIA POSTPROCEDURE EVALUATION
Patient met criteria for discharge to floor direct from OR. Baseline mental status and hemodynamic profile similar to baseline.

## 2020-01-30 NOTE — INTERVAL H&P NOTE
H&P Update:  Manny Heide. was seen and examined and no change from yesterday, he is very sick, septic from left foot infection. He will have aggressive debridement of calcaneous(heel), I&D, bone biopsy left foot.

## 2020-01-30 NOTE — BRIEF OP NOTE
BRIEF OPERATIVE NOTE    Date of Procedure: 1/30/2020   Preoperative Diagnosis: OSTEOMYLETITIS LEFT FOOT  Postoperative Diagnosis: OSTEOMYLETITIS LEFT FOOT    Procedure(s):  INCISION AND DRAINAGE, WITH AGGRESSIVE DEBRIDEMENT OF CALCANEOUS ,BIOPSY OF LEFT FOOT WITH C-ARM  Surgeon(s) and Role:     * Eleni Matos DPM - Primary         Surgical Assistant:NURSE    Surgical Staff:  Circ-1: Onofre Meléndez, RN  Radiology Technician: Jasmyne Recinos RT, R  Scrub Tech-1: Rosanne Hernández  Scrub Tech-Relief: Redia Moh  Surg Asst-1: Cindee Osgood S  Event Time In Time Out   Incision Start 01/30/2020 1403    Incision Close 01/30/2020 1512      Anesthesia: Mac with local  Estimated Blood Loss:about 20cc's  Specimens:   ID Type Source Tests Collected by Time Destination   1 : LEFT CALCANEOUS Preservative Heel  Eleni Matos DPM 1/30/2020 1435 Pathology   2 : LEFT ACHILLES TENDON Preservative OTHER (use comments)  Eleni Matos DPM 1/30/2020 1435 Pathology   1 : CULTURE LEFT HEEL  Wound Heel CULTURE, ANAEROBIC, CULTURE, WOUND W GRAM STAIN, GRAM STAIN, CULTURE & SMEAR, AFB, CULTURE, Cata Kumar DPM 1/30/2020 1411 Microbiology   2 : LEFT CALCANEOUS Tissue Heel CULTURE, ANAEROBIC, CULTURE, TISSUE W [facility] 1/30/2020 1434 Microbiology      Findings: massive erosion of calcaneous and irregular ruptured achilles tendon  Complications: none  Implants: * No implants in log *

## 2020-01-31 LAB
ANION GAP SERPL CALC-SCNC: 8 MMOL/L (ref 3–18)
BASOPHILS # BLD: 0 K/UL (ref 0–0.1)
BASOPHILS NFR BLD: 0 % (ref 0–3)
BUN SERPL-MCNC: 44 MG/DL (ref 7–18)
BUN/CREAT SERPL: 5 (ref 12–20)
CALCIUM SERPL-MCNC: 7.7 MG/DL (ref 8.5–10.1)
CHLORIDE SERPL-SCNC: 102 MMOL/L (ref 100–111)
CO2 SERPL-SCNC: 29 MMOL/L (ref 21–32)
CREAT SERPL-MCNC: 9.19 MG/DL (ref 0.6–1.3)
DIFFERENTIAL METHOD BLD: ABNORMAL
EOSINOPHIL # BLD: 0.3 K/UL (ref 0–0.4)
EOSINOPHIL NFR BLD: 1 % (ref 0–5)
ERYTHROCYTE [DISTWIDTH] IN BLOOD BY AUTOMATED COUNT: 19.8 % (ref 11.6–14.5)
GLUCOSE BLD STRIP.AUTO-MCNC: 136 MG/DL (ref 70–110)
GLUCOSE BLD STRIP.AUTO-MCNC: 43 MG/DL (ref 70–110)
GLUCOSE BLD STRIP.AUTO-MCNC: 49 MG/DL (ref 70–110)
GLUCOSE BLD STRIP.AUTO-MCNC: 49 MG/DL (ref 70–110)
GLUCOSE BLD STRIP.AUTO-MCNC: 50 MG/DL (ref 70–110)
GLUCOSE BLD STRIP.AUTO-MCNC: 64 MG/DL (ref 70–110)
GLUCOSE BLD STRIP.AUTO-MCNC: 90 MG/DL (ref 70–110)
GLUCOSE BLD STRIP.AUTO-MCNC: 92 MG/DL (ref 70–110)
GLUCOSE SERPL-MCNC: 99 MG/DL (ref 74–99)
HCT VFR BLD AUTO: 30.5 % (ref 36–48)
HGB BLD-MCNC: 9.4 G/DL (ref 13–16)
LACTATE SERPL-SCNC: 1.4 MMOL/L (ref 0.4–2)
LYMPHOCYTES # BLD: 0.3 K/UL (ref 0.8–3.5)
LYMPHOCYTES NFR BLD: 1 % (ref 20–51)
MAGNESIUM SERPL-MCNC: 2 MG/DL (ref 1.6–2.6)
MCH RBC QN AUTO: 24.2 PG (ref 24–34)
MCHC RBC AUTO-ENTMCNC: 30.8 G/DL (ref 31–37)
MCV RBC AUTO: 78.4 FL (ref 74–97)
MONOCYTES # BLD: 2.1 K/UL (ref 0–1)
MONOCYTES NFR BLD: 8 % (ref 2–9)
NEUTS BAND NFR BLD MANUAL: 17 % (ref 0–5)
NEUTS SEG # BLD: 19.1 K/UL (ref 1.8–8)
NEUTS SEG NFR BLD: 73 % (ref 42–75)
PLATELET # BLD AUTO: 205 K/UL (ref 135–420)
PMV BLD AUTO: 9.3 FL (ref 9.2–11.8)
POTASSIUM SERPL-SCNC: 4 MMOL/L (ref 3.5–5.5)
RBC # BLD AUTO: 3.89 M/UL (ref 4.7–5.5)
RBC MORPH BLD: ABNORMAL
RBC MORPH BLD: ABNORMAL
SODIUM SERPL-SCNC: 139 MMOL/L (ref 136–145)
WBC # BLD AUTO: 26.2 K/UL (ref 4.6–13.2)
WBC MORPH BLD: ABNORMAL

## 2020-01-31 PROCEDURE — 65270000029 HC RM PRIVATE

## 2020-01-31 PROCEDURE — 85025 COMPLETE CBC W/AUTO DIFF WBC: CPT

## 2020-01-31 PROCEDURE — 82962 GLUCOSE BLOOD TEST: CPT

## 2020-01-31 PROCEDURE — 83605 ASSAY OF LACTIC ACID: CPT

## 2020-01-31 PROCEDURE — 74011000258 HC RX REV CODE- 258: Performed by: INTERNAL MEDICINE

## 2020-01-31 PROCEDURE — 74011000258 HC RX REV CODE- 258: Performed by: HOSPITALIST

## 2020-01-31 PROCEDURE — 36415 COLL VENOUS BLD VENIPUNCTURE: CPT

## 2020-01-31 PROCEDURE — 74011250636 HC RX REV CODE- 250/636: Performed by: SPECIALIST

## 2020-01-31 PROCEDURE — 77030018836 HC SOL IRR NACL ICUM -A

## 2020-01-31 PROCEDURE — 74011250636 HC RX REV CODE- 250/636: Performed by: FAMILY MEDICINE

## 2020-01-31 PROCEDURE — 74011250637 HC RX REV CODE- 250/637: Performed by: PODIATRIST

## 2020-01-31 PROCEDURE — P9047 ALBUMIN (HUMAN), 25%, 50ML: HCPCS | Performed by: INTERNAL MEDICINE

## 2020-01-31 PROCEDURE — 83735 ASSAY OF MAGNESIUM: CPT

## 2020-01-31 PROCEDURE — 74011000258 HC RX REV CODE- 258: Performed by: FAMILY MEDICINE

## 2020-01-31 PROCEDURE — C9113 INJ PANTOPRAZOLE SODIUM, VIA: HCPCS | Performed by: HOSPITALIST

## 2020-01-31 PROCEDURE — 80048 BASIC METABOLIC PNL TOTAL CA: CPT

## 2020-01-31 PROCEDURE — 90935 HEMODIALYSIS ONE EVALUATION: CPT

## 2020-01-31 PROCEDURE — 74011250636 HC RX REV CODE- 250/636: Performed by: INTERNAL MEDICINE

## 2020-01-31 PROCEDURE — 74011000250 HC RX REV CODE- 250: Performed by: HOSPITALIST

## 2020-01-31 PROCEDURE — 74011250636 HC RX REV CODE- 250/636: Performed by: HOSPITALIST

## 2020-01-31 RX ORDER — ALBUMIN HUMAN 250 G/1000ML
25 SOLUTION INTRAVENOUS
Status: DISCONTINUED | OUTPATIENT
Start: 2020-01-31 | End: 2020-02-05 | Stop reason: HOSPADM

## 2020-01-31 RX ORDER — MAGNESIUM SULFATE 100 %
4 CRYSTALS MISCELLANEOUS AS NEEDED
Status: DISCONTINUED | OUTPATIENT
Start: 2020-01-31 | End: 2020-02-05 | Stop reason: HOSPADM

## 2020-01-31 RX ORDER — DEXTROSE MONOHYDRATE 100 MG/ML
125-250 INJECTION, SOLUTION INTRAVENOUS AS NEEDED
Status: DISCONTINUED | OUTPATIENT
Start: 2020-01-31 | End: 2020-02-05 | Stop reason: HOSPADM

## 2020-01-31 RX ADMIN — Medication 10 ML: at 05:59

## 2020-01-31 RX ADMIN — SODIUM CHLORIDE 250 ML: 900 INJECTION, SOLUTION INTRAVENOUS at 00:28

## 2020-01-31 RX ADMIN — SODIUM CHLORIDE 40 MG: 9 INJECTION, SOLUTION INTRAMUSCULAR; INTRAVENOUS; SUBCUTANEOUS at 09:42

## 2020-01-31 RX ADMIN — LEVOFLOXACIN 500 MG: 5 INJECTION, SOLUTION INTRAVENOUS at 11:08

## 2020-01-31 RX ADMIN — ACETAMINOPHEN 650 MG: 325 TABLET, FILM COATED ORAL at 00:34

## 2020-01-31 RX ADMIN — SODIUM CHLORIDE 500 ML: 900 INJECTION, SOLUTION INTRAVENOUS at 08:47

## 2020-01-31 RX ADMIN — ACETAMINOPHEN 650 MG: 325 TABLET, FILM COATED ORAL at 13:15

## 2020-01-31 RX ADMIN — DEXTROSE MONOHYDRATE 125 ML: 10 INJECTION, SOLUTION INTRAVENOUS at 23:51

## 2020-01-31 RX ADMIN — Medication 5 ML: at 22:00

## 2020-01-31 RX ADMIN — HEPARIN SODIUM 5000 UNITS: 5000 INJECTION INTRAVENOUS; SUBCUTANEOUS at 00:35

## 2020-01-31 RX ADMIN — ALBUMIN (HUMAN) 25 G: 0.25 INJECTION, SOLUTION INTRAVENOUS at 16:35

## 2020-01-31 RX ADMIN — PIPERACILLIN AND TAZOBACTAM 2.25 G: 2; .25 INJECTION, POWDER, LYOPHILIZED, FOR SOLUTION INTRAVENOUS at 00:35

## 2020-01-31 RX ADMIN — CLINDAMYCIN PHOSPHATE 600 MG: 600 INJECTION, SOLUTION INTRAVENOUS at 06:03

## 2020-01-31 RX ADMIN — ALBUMIN (HUMAN) 25 G: 0.25 INJECTION, SOLUTION INTRAVENOUS at 17:35

## 2020-01-31 RX ADMIN — ALBUMIN (HUMAN) 25 G: 0.25 INJECTION, SOLUTION INTRAVENOUS at 15:25

## 2020-01-31 RX ADMIN — SODIUM CHLORIDE: 234 INJECTION INTRAMUSCULAR; INTRAVENOUS; SUBCUTANEOUS at 07:37

## 2020-01-31 RX ADMIN — PIPERACILLIN AND TAZOBACTAM 2.25 G: 2; .25 INJECTION, POWDER, LYOPHILIZED, FOR SOLUTION INTRAVENOUS at 09:42

## 2020-01-31 RX ADMIN — DAPTOMYCIN 500 MG: 500 INJECTION, POWDER, LYOPHILIZED, FOR SOLUTION INTRAVENOUS at 20:11

## 2020-01-31 RX ADMIN — ACETAMINOPHEN 650 MG: 325 TABLET, FILM COATED ORAL at 20:11

## 2020-01-31 RX ADMIN — HEPARIN SODIUM 5000 UNITS: 5000 INJECTION INTRAVENOUS; SUBCUTANEOUS at 09:42

## 2020-01-31 RX ADMIN — SODIUM CHLORIDE 500 ML: 900 INJECTION, SOLUTION INTRAVENOUS at 05:17

## 2020-01-31 RX ADMIN — ACETAMINOPHEN 650 MG: 325 TABLET, FILM COATED ORAL at 06:03

## 2020-01-31 NOTE — PROGRESS NOTES
BP 80/44, rechecked to be 89/44, pt on D10 0.45% NS infusing at 75ml/hr. MD notified, new orders received. 0423: BP 84/40, rechecked to be 86/43, MD paged, orders received for NS bolus and lactis acid blood work    0522: Pt informed that RN may not be able to administer PRN morphine d/t BP trends, he verbalized understanding. Pt on schedule Tylenol.     Dressing to left foot clean, dry and intact, no drainage observed, lactic acid 1.4

## 2020-01-31 NOTE — PROGRESS NOTES
Hospitalist Progress Note-critical care note     Patient: Tony Alvarado. MRN: [id number]  CSN: [id number]    YOB: 1970  Age: 54 y.o. Sex: male    DOA: 1/29/2020 LOS:  LOS: 2 days            Chief complaint: hypoglycemia, esrd on HD, cellulitis and abscess of foot.  HTN     Assessment/Plan         Hospital Problems  Date Reviewed: 1/30/2020          Codes Class Noted POA    Hypoglycemia ICD-10-CM: E16.2  ICD-9-CM: 251.2  1/30/2020 Unknown        Dehydration ICD-10-CM: E86.0  ICD-9-CM: 276.51  1/29/2020 Yes        ESRD on hemodialysis [facility]) ICD-10-CM: N18.6, Z99.2  ICD-9-CM: 585.6, V45.11  1/29/2020 Yes        * (Principal) Cellulitis and abscess of foot ICD-10-CM: L03.119, L02.619  ICD-9-CM: 682.7  1/29/2020 Yes        HTN (hypertension) ICD-10-CM: I10  ICD-9-CM: 401.9  1/29/2020 Yes        Acute osteomyelitis ([facility] [address] ICD-10-CM: M86.10  ICD-9-CM: 730.00  1/29/2020 Yes        Hx of BKA, right ([facility] ICD-10-CM: Z89.511  ICD-9-CM: V49.75  1/29/2020 Yes        Esophagitis ICD-10-CM: K20.9  ICD-9-CM: 530.10  1/29/2020 Unknown        Anemia of chronic disease ICD-10-CM: D63.8  ICD-9-CM: 285.29  2/28/2018 Yes              Cellulitis and abscess of the foot, acute osteomyelitis  INCISION AND DRAINAGE, WITH AGGRESSIVE DEBRIDEMENT OF CALCANEOUS ,BIOPSY OF LEFT FOOT WITH C-ARM per Dr. Barbara Rossi   Case discussed with Dr. Shahid Hale cx :STAPHYLOCOCCUS SPECIES      Dehydration  Change to d5      End-stage renal disease on HD  Scheduled for HD Tuesday Thursday and Saturday,  HD per renal        Hypotension-check manually bp is ok -give some bolus  He did not take medication daily, because bp will drop during HD  -hold bp meds due to boarderline bp   Lactic acid wnl   Continue iv hydration        Right BKA  Fall precaution.     Chronic anemia due to end-stage renal disease  Continue monitor H&H-stable from medication      Esophagitis   ppi      Hypoglycemia   Resolved -still not having good appetite Subjective: tired      RN :bp better after checking manually      Disposition :tbd,   Review of systems:    General: No fevers or chills. Tired   Cardiovascular: No chest pain or pressure. No palpitations. Pulmonary: No shortness of breath. Gastrointestinal: No nausea, vomiting. Vital signs/Intake and Output:  Visit Vitals  /54 (BP 1 Location: Right arm, BP Patient Position: At rest)   Pulse 88   Temp 97.6 °F (36.4 °C)   Resp 16   Ht 5' 7\" (1.702 m)   Wt 83.9 kg (184 lb 15.5 oz)   SpO2 98%   BMI 28.97 kg/m²     Current Shift:  01/31 0701 - 01/31 1900  In: 946.3 [I.V.:946.3]  Out: -   Last three shifts:  01/29 1901 - 01/31 0700  In: 2055 [P.O.:660; I.V.:1395]  Out: 20     Physical Exam:  General: WD, WN. Alert, cooperative, no acute distress    HEENT: NC, Atraumatic. PERRLA, anicteric sclerae. Lungs: CTA Bilaterally. No Wheezing/Rhonchi/Rales. Heart:  Regular  rhythm,  No murmur, No Rubs, No Gallops  Abdomen: Soft, Non distended, Non tender.  +Bowel sounds,   Extremities: No c/c. Rt bka, Left foot wrapped with ace bandage   Psych:   Not anxious or agitated. Neurologic:  No acute neurological deficit. Labs: Results:       Chemistry Recent Labs     01/31/20 0220 01/30/20  0500 01/29/20  0446   GLU 99 39* 93    142 136   K 4.0 3.9 4.6    104 89*   CO2 29 30 32   BUN 44* 35* 84*   CREA 9.19* 8.05* 14.20*   CA 7.7* 7.9* 8.8   AGAP 8 8 15   BUCR 5* 4* 6*   AP  --   --  83   TP  --   --  9.8*   ALB  --   --  2.3*   GLOB  --   --  7.5*   AGRAT  --   --  0.3*      CBC w/Diff Recent Labs     01/31/20 0220 01/30/20  0500 01/29/20  0355   WBC 26.2* 23.3* 12.0   RBC 3.89* 4.04* 5.24   HGB 9.4* 9.8* 12.9*   HCT 30.5* 32.2* 40.2    212 356   GRANS 73 88* 79*   LYMPH 1* 5* 10*   EOS 1 0 1      Cardiac Enzymes Recent Labs     01/29/20  0446      CKND1 1.0      Coagulation No results for input(s): PTP, INR, APTT, INREXT, INREXT in the last 72 hours.     Lipid Panel No results found for: CHOL, CHOLPOCT, CHOLX, CHLST, CHOLV, 412089, HDL, HDLP, LDL, LDLC, DLDLP, 401687, VLDLC, VLDL, TGLX, TRIGL, TRIGP, TGLPOCT, CHHD, CHHDX   BNP No results for input(s): BNPP in the last 72 hours.    Liver Enzymes Recent Labs     01/29/20  0446   TP 9.8*   ALB 2.3*   AP 83   SGOT 30      Thyroid Studies No results found for: T4, T3U, TSH, TSHEXT, TSHEXT     Procedures/imaging: see electronic medical records for all procedures/Xrays and details which were not copied into this note but were reviewed prior to creation of Candis Leyva MD

## 2020-01-31 NOTE — CONSULTS
[facility]                                            (A Division of [address] [place] [address])                                   Consultation Note      Date of Admission: 1/29/2020    Date of Consultation: 1/31/2020    Referred by: Amy Chatterjee MD    Reason for Referral: L DFU/sepsis    Current Antimicrobials: Prior Antimicrobials   1. Pip/tzb IV (1/29-) #2  2. Levo IV (1/29-) #2  3. Clinda IV (1/29-) #2        Assessment: Plan:   L calcaneal OM/DFU - complicated    Pretty ill. Growing Staphylococcus aureus out of surface cultures from Wednesday. OR specimens got a staphylococcus of some sort (still growing/developing). Septoid vitals earlier today - don't have a high threshold of kicking him over to ICU if needed. I've tweaked his GPC abx to better killer given his vancomycin allergy. Stop the levofloxacin; he has enough issues with his tendons already. Like the pip/tzb - keep it going for now. ->POD#1    Stop the clindamycin and levofloxacin  Start daptomycin 6mg/kg after every HD session TIW (First dose today); I'm using a higher concentration given his hemodynamic instability even though he has not grown a BCx. Sepsis    Like I said, have a low threshold of moving him to ICU if his numbers don't improve/or worsen. ESRD-HD    DMT2          Microbiology:  1/30 - OR#1 - NOS (+) Staph      OR#2 - NOS (+) Staph     1/29 - Wd (+) S aureus and CoNS      Wd #2 - (+) S aureus and GNRs      Lines / Catheters: Peripheral      HPI:  CC:  I've been fighting my heel  Mr Dwight Guzman is a 54y AAM diabetic ESRD-HD who has been fighting a L heel DFU for weeks. Worsened considerably this week prompting ER visit on 1/29. Also presented with N/V and ABD pain that has improved. Went to OR yesterday for debridement. Felt crummy last night/this morning.          Active Hospital Problems    Diagnosis Date Noted    Hypoglycemia 01/30/2020    Dehydration 01/29/2020    ESRD on hemodialysis ([facility] 01/29/2020    Cellulitis and abscess of foot 01/29/2020    HTN (hypertension) 01/29/2020    [facility]omyelitis ([facility] 01/29/2020    Hx of BKA, right ([facility] 01/29/2020    Esophagitis 01/29/2020    Anemia of chronic disease 02/28/2018     Past Medical History:   Diagnosis Date    Chronic kidney disease     Diabetes ([facility]     Heart failure ([facility]     Hypertension      Past Surgical History:   Procedure Laterality Date    HX ORTHOPAEDIC      right BKA 2017     History reviewed. No pertinent family history. Social History     Socioeconomic History    Marital status: LEGALLY      Spouse name: Not on file    Number of children: Not on file    Years of education: Not on file    Highest education level: Not on file   Occupational History    Not on file   Social Needs    Financial resource strain: Not on file    Food insecurity:     Worry: Not on file     Inability: Not on file    Transportation needs:     Medical: Not on file     Non-medical: Not on file   Tobacco Use    Smoking status: Former Smoker    Smokeless tobacco: Never Used   Substance and Sexual Activity    Alcohol use: No    Drug use: No    Sexual activity: Not on file   Lifestyle    Physical activity:     Days per week: Not on file     Minutes per session: Not on file    Stress: Not on file   Relationships    Social connections:     Talks on phone: Not on file     Gets together: Not on file     Attends Hinduism service: Not on file     Active member of club or organization: Not on file     Attends meetings of clubs or organizations: Not on file     Relationship status: Not on file    Intimate partner violence:     Fear of current or ex partner: Not on file     Emotionally abused: Not on file     Physically abused: Not on file     Forced sexual activity: Not on file   Other Topics Concern    Not on file   Social History Narrative    Not on file       Allergies:  Vancomycin .      Medications:  Current Facility-Administered Medications   Medication Dose Route Frequency    albumin human 25% (BUMINATE) solution 25 g  25 g IntraVENous DIALYSIS PRN    DAPTOmycin (CUBICIN) 500 mg in 0.9% sodium chloride 50 mL IVPB RF formulation  500 mg IntraVENous 3 times weekly    promethazine (PHENERGAN) 25 mg in 0.9% sodium chloride 50 mL IVPB  25 mg IntraVENous Q4H PRN    morphine 10 mg/ml injection 4 mg  4 mg IntraVENous Q3H PRN    sodium chloride 0.45 % in dextrose 10% 1,019.6 mL infusion   IntraVENous CONTINUOUS    sodium chloride (NS) flush 5-40 mL  5-40 mL IntraVENous Q8H    sodium chloride (NS) flush 5-40 mL  5-40 mL IntraVENous PRN    acetaminophen (TYLENOL) tablet 650 mg  650 mg Oral Q6H    HYDROmorphone (DILAUDID) tablet 2 mg  2 mg Oral Q4H PRN    HYDROmorphone (PF) (DILAUDID) injection 1 mg  1 mg IntraVENous Q4H PRN    naloxone (NARCAN) injection 0.4 mg  0.4 mg IntraVENous PRN    influenza vaccine 2019-20 (6 mos+)(PF) (FLUARIX/FLULAVAL/FLUZONE QUAD) injection 0.5 mL  0.5 mL IntraMUSCular PRIOR TO DISCHARGE    insulin lispro (HUMALOG) injection   SubCUTAneous AC&HS    acetaminophen (TYLENOL) tablet 650 mg  650 mg Oral Q4H PRN    naloxone (NARCAN) injection 0.4 mg  0.4 mg IntraVENous PRN    diphenhydrAMINE (BENADRYL) injection 12.5 mg  12.5 mg IntraVENous Q4H PRN    ondansetron (ZOFRAN) injection 4 mg  4 mg IntraVENous Q4H PRN    heparin (porcine) injection 5,000 Units  5,000 Units SubCUTAneous Q8H    hydrALAZINE (APRESOLINE) 20 mg/mL injection 10 mg  10 mg IntraVENous Q6H PRN    piperacillin-tazobactam (ZOSYN) 2.25 g in 0.9% sodium chloride (MBP/ADV) 50 mL MBP  2.25 g IntraVENous Q8H    [Held by provider] carvediloL (COREG) tablet 25 mg  25 mg Oral BID WITH MEALS    [Held by provider] isosorbide mononitrate ER (IMDUR) tablet 60 mg  60 mg Oral DAILY    [Held by provider] hydrALAZINE (APRESOLINE) tablet 25 mg  25 mg Oral TID    pantoprazole (PROTONIX) 40 mg in 0.9% sodium chloride 10 mL injection  40 mg IntraVENous DAILY        ROS:  Constitutional: positive for fevers, chills and sweats, negative for anorexia  Respiratory: negative for cough or sputum  Cardiovascular: negative for chest pain, dyspnea  Gastrointestinal: negative for diarrhea     Physical Exam:  Temp (24hrs), Av.9 °F (36.6 °C), Min:97.6 °F (36.4 °C), Max:98.3 °F (36.8 °C)    Visit Vitals  /46 (BP 1 Location: Right arm, BP Patient Position: At rest)   Pulse 92   Temp 97.6 °F (36.4 °C)   Resp 18   Ht 5' 7\" (1.702 m)   Wt 83.9 kg (184 lb 15.5 oz)   SpO2 97%   BMI 28.97 kg/m²       General: Well developed, well nourished 54 y.o.  male in no acute distress. ENT: ENT exam normal, no neck nodes or sinus tenderness  Head: normocephalic, without obvious abnormality  Mouth:  mucous membranes moist, pharynx normal without lesions  Neck: supple, symmetrical, trachea midline   Cardio:  regular rate and rhythm, S1, S2 normal, no murmur, click, rub or gallop  Chest: inspection normal - no chest wall deformities or tenderness, respiratory effort normal  Lungs: clear to auscultation, no wheezes or rales and unlabored breathing  Abdomen: soft, non-tender. Bowel sounds normal. No masses, no organomegaly. Extremities:  no redness or tenderness in the calves or thighs, Missing R leg BK; L heel wrapped - no odor.   Neuro: Grossly normal     Lab results:    Chemistry  Recent Labs     20  0500 20  0446   GLU 99 39* 93    142 136   K 4.0 3.9 4.6    104 89*   CO2 29 30 32   BUN 44* 35* 84*   CREA 9.19* 8.05* 14.20*   CA 7.7* 7.9* 8.8   AGAP 8 8 15   BUCR 5* 4* 6*   AP  --   --  83   TP  --   --  9.8*   ALB  --   --  2.3*   GLOB  --   --  7.5*   AGRAT  --   --  0.3*       CBC w/ Diff  Recent Labs     20  0500 20  0355   WBC 26.2* 23.3* 12.0   RBC 3.89* 4.04* 5.24   HGB 9.4* 9.8* 12.9*   HCT 30.5* 32.2* 40.2    212 356   GRANS 73 88* 79*   LYMPH 1* 5* 10*   EOS 1 0 1 Microbiology  All Micro Results     Procedure Component Value Units Date/Time    CULTURE, Clayborn Mare STAIN [[id number]]  (Abnormal) Collected:  01/30/20 1430    Order Status:  Completed Specimen:  Wound from Heel Updated:  01/31/20 1139     Special Requests: NO SPECIAL REQUESTS        GRAM STAIN NO ORGANISMS SEEN         NO WBC'S SEEN        Culture result:       FEW STAPHYLOCOCCUS SPECIES          CULTURE, TISSUE Harlen Fickle STAIN [[id number]]  (Abnormal) Collected:  01/30/20 1434    Order Status:  Completed Specimen:  Tissue Updated:  01/31/20 1129     Special Requests: NO SPECIAL REQUESTS        GRAM STAIN FEW WBC'S         NO ORGANISMS SEEN        Culture result:       FEW STAPHYLOCOCCUS SPECIES          CULTURE, WOUND Harlen Fickle STAIN [[id number]]  (Abnormal) Collected:  01/29/20 0500    Order Status:  Completed Specimen:  Wound from Ankle Updated:  01/31/20 0815     Special Requests: NO SPECIAL REQUESTS        GRAM STAIN MANY WBC'S               MODERATE GRAM POSITIVE COCCI IN GROUPS           Culture result:       MODERATE STAPHYLOCOCCUS AUREUS                  MODERATE GRAM NEGATIVE RODS                  FEW POSSIBLE 2ND GRAM NEGATIVE RODS          CULTURE, Clayborn Mare STAIN [[id number]]  (Abnormal) Collected:  01/29/20 0500    Order Status:  Completed Specimen:  Wound from Heel Updated:  01/31/20 0810     Special Requests: NO SPECIAL REQUESTS        GRAM STAIN NO WBC'S SEEN         NO ORGANISMS SEEN        Culture result: FEW GRAM NEGATIVE RODS               FEW POSSIBLE 2ND GRAM NEGATIVE RODS            FEW STAPHYLOCOCCUS AUREUS               FEW STAPHYLOCOCCUS SPECIES, COAGULASE NEGATIVE          AFB CULTURE + SMEAR W/RFLX ID FROM CULTURE [[id number]] Collected:  01/30/20 1430    Order Status:  Completed Updated:  01/30/20 1516    CULTURE, FUNGUS (MYCOLOGY) [[id number]] Collected:  01/30/20 1430    Order Status:  Completed Specimen:  Various Updated:  01/30/20 1515           Tlyer Brizuela MD  Cell [phone]  [facility]   1/31/2020   3:26 PM

## 2020-01-31 NOTE — ROUTINE PROCESS
Bedside shift change report given to Luz Real (oncoming nurse) by Misty Wahl RN (offgoing nurse). Report included the following information SBAR, Procedure Summary, Intake/Output, MAR and Recent Results. BP still low at 80/34, oncoming RN informed as well as previous therapy.  Will f/u with MD

## 2020-01-31 NOTE — PROGRESS NOTES
1/31/2020 PT note: consult received and chart reviewed. Evaluation attempted. Pt drowsy, c/o pain 11/10 all over, stating \"my skin feels sore\" (from pre-op wipes). Pt declines PT at this time. Will notify nurse of above. Will f/u at later time as pt schedule allows for PT evaluation. Thank you.    Servando Murphy, PT

## 2020-01-31 NOTE — PROGRESS NOTES
Problem: Falls - Risk of  Goal: *Absence of Falls  Description  Document William Malone Fall Risk and appropriate interventions in the flowsheet. Outcome: Progressing Towards Goal  Note: Fall Risk Interventions:  Mobility Interventions: Communicate number of staff needed for ambulation/transfer, Patient to call before getting OOB     Medication Interventions: Evaluate medications/consider consulting pharmacy, Patient to call before getting OOB    Elimination Interventions: Call light in reach, Patient to call for help with toileting needs     Problem: Patient Education: Go to Patient Education Activity  Goal: Patient/Family Education  Outcome: Progressing Towards Goal     Problem: Chronic Renal Failure  Goal: *Fluid and electrolytes stabilized  Outcome: Progressing Towards Goal     Problem: Patient Education: Go to Patient Education Activity  Goal: Patient/Family Education  Outcome: Progressing Towards Goal     Problem: Pressure Injury - Risk of  Goal: *Prevention of pressure injury  Description  Document Tucker Scale and appropriate interventions in the flowsheet.   Outcome: Progressing Towards Goal  Note: Pressure Injury Interventions:  Sensory Interventions: Assess changes in LOC, Check visual cues for pain       Activity Interventions: Pressure redistribution bed/mattress(bed type)    Mobility Interventions: HOB 30 degrees or less, Pressure redistribution bed/mattress (bed type)    Nutrition Interventions: Document food/fluid/supplement intake, Offer support with meals,snacks and hydration    Friction and Shear Interventions: HOB 30 degrees or less, Apply protective barrier, creams and emollients       Problem: Patient Education: Go to Patient Education Activity  Goal: Patient/Family Education  Outcome: Progressing Towards Goal     Problem: Pain  Goal: *Control of Pain  Outcome: Progressing Towards Goal     Problem: Patient Education: Go to Patient Education Activity  Goal: Patient/Family Education  Outcome: Progressing Towards Goal

## 2020-01-31 NOTE — PROGRESS NOTES
Tramaine Mccain [address], [address] [facility] [address]  [place], [address] [place] [address]  [phone](R)  [phone](Q)  [phone](F)     Hospital Problems  Date Reviewed: 1/30/2020           Codes Class Noted POA     Hypoglycemia ICD-10-CM: E16.2  ICD-9-CM: 156. 2   1/30/2020 Unknown           Dehydration ICD-10-CM: E86.0  ICD-9-CM: 276.51   1/29/2020 Yes           ESRD on hemodialysis (HCC) ICD-10-CM: N18.6, Z99.2  ICD-9-CM: 585.6, V45.11   1/29/2020 Yes           * (Principal) Cellulitis and abscess of foot ICD-10-CM: L03.119, L02.619  ICD-9-CM: 682. 7   1/29/2020 Yes           HTN (hypertension) ICD-10-CM: I10  ICD-9-CM: 401. 9   1/29/2020 Yes           Acute osteomyelitis ([facility] ICD-10-CM: M86.10  ICD-9-CM: 730.00   1/29/2020 Yes           Hx of BKA, right ([facility] ICD-10-CM: Z89.511  ICD-9-CM: V49.75   1/29/2020 Yes           Esophagitis ICD-10-CM: K20.9  ICD-9-CM: 530.10   1/29/2020 Unknown           Anemia of chronic disease ICD-10-CM: D63.8  ICD-9-CM: 285.29   2/28/2018 Yes                   Cellulitis and abscess of the foot, acute osteomyelitis    Plan:  1. Patient is to have daily wound care:flush wound and pack wound with 1/4\" iodoform gauze by nurses. 2. Patient is to be completely non wt bearing left foot. S/   Patient is in dialysis, not in his room at this moment, so I will follow-up tomorrow and will review all of his labs today. Patient is status post aggressive I&D, bone biopsy and partial calcanectomy left foot post op day 1. O/  Results for Werner Lake (MRN [id number]) as of 1/31/2020 16:16   Ref.  Range 1/29/2020 03:55 1/30/2020 05:00 1/31/2020 02:20   WBC Latest Ref Range: 4.6 - 13.2 K/uL 12.0 23.3 (H) 26.2 (H)   RBC Latest Ref Range: 4.70 - 5.50 M/uL 5.24 4.04 (L) 3.89 (L)   HGB Latest Ref Range: 13.0 - 16.0 g/dL 12.9 (L) 9.8 (L) 9.4 (L)   HCT Latest Ref Range: 36.0 - 48.0 % 40.2 32.2 (L) 30.5 (L)   MCV Latest Ref Range: 74.0 - 97.0 FL 76.7 79.7 78.4   MCH Latest Ref Range: 24.0 - 34.0 PG 24.6 24.3 24.2   MCHC Latest Ref Range: 31.0 - 37.0 g/dL 32.1 30.4 (L) 30.8 (L)   RDW Latest Ref Range: 11.6 - 14.5 % 19.9 (H) 19.9 (H) 19.8 (H)   PLATELET Latest Ref Range: 135 - 420 K/uL 356 212 205   MPV Latest Ref Range: 9.2 - 11.8 FL 9.5 9.5 9.3   NEUTROPHILS Latest Ref Range: 42 - 75 % 79 (H) 88 (H) 73   BAND NEUTROPHILS Latest Ref Range: 0 - 5 %  3 17 (H)   LYMPHOCYTES Latest Ref Range: 20 - 51 % 10 (L) 5 (L) 1 (L)   MONOCYTES Latest Ref Range: 2 - 9 % 10 4 8   EOSINOPHILS Latest Ref Range: 0 - 5 % 1 0 1   BASOPHILS Latest Ref Range: 0 - 3 % 0 0 0   DF Latest Units:   AUTOMATED MANUAL MANUAL   ABS. NEUTROPHILS Latest Ref Range: 1.8 - 8.0 K/UL 9.4 (H) 22.0 (H) 19.1 (H)   ABS. LYMPHOCYTES Latest Ref Range: 0.8 - 3.5 K/UL 1.2 0.4 (L) 0.3 (L)   ABS. MONOCYTES Latest Ref Range: 0 - 1.0 K/UL 1.2 0.8 2.1 (H)   ABS. EOSINOPHILS Latest Ref Range: 0.0 - 0.4 K/UL 0.2 0.1 0.3   ABS. BASOPHILS Latest Ref Range: 0.0 - 0.1 K/UL 0.0 0.0 0.0     Results for Mireya Rice (MRN [id number]) as of 1/31/2020 16:16   Ref.  Range 1/29/2020 04:46 1/30/2020 05:00 1/31/2020 02:20   Sodium Latest Ref Range: 136 - 145 mmol/L 136 142 139   Potassium Latest Ref Range: 3.5 - 5.5 mmol/L 4.6 3.9 4.0   Chloride Latest Ref Range: 100 - 111 mmol/L 89 (L) 104 102   CO2 Latest Ref Range: 21 - 32 mmol/L 32 30 29   Anion gap Latest Ref Range: 3.0 - 18 mmol/L 15 8 8   Glucose Latest Ref Range: 74 - 99 mg/dL 93 39 (LL) 99   BUN Latest Ref Range: 7.0 - 18 MG/DL 84 (H) 35 (H) 44 (H)   Creatinine Latest Ref Range: 0.6 - 1.3 MG/DL 14.20 (H) 8.05 (H) 9.19 (H)   BUN/Creatinine ratio Latest Ref Range: 12 - 20   6 (L) 4 (L) 5 (L)   Calcium Latest Ref Range: 8.5 - 10.1 MG/DL 8.8 7.9 (L) 7.7 (L)   Magnesium Latest Ref Range: 1.6 - 2.6 mg/dL 2.6 2.0 2.0   GFR est non-AA Latest Ref Range: >60 ml/min/1.73m2 4 (L) 7 (L) 6 (L)   GFR est AA Latest Ref Range: >60 ml/min/1.73m2 5 (L) 9 (L) 8 (L)   Bilirubin, total Latest Ref Range: 0.2 - 1.0 MG/DL 0.7     Protein, total Latest Ref Range: 6.4 - 8.2 g/dL 9.8 (H)     Albumin Latest Ref Range: 3.4 - 5.0 g/dL 2.3 (L)     Globulin Latest Ref Range: 2.0 - 4.0 g/dL 7.5 (H)     A-G Ratio Latest Ref Range: 0.8 - 1.7   0.3 (L)     ALT (SGPT) Latest Ref Range: 16 - 61 U/L 28     AST Latest Ref Range: 10 - 38 U/L 30     Alk. phosphatase Latest Ref Range: 45 - 117 U/L 83     CK Latest Ref Range: 39 - 308 U/L 109     CK-MB Index Latest Ref Range: 0.0 - 4.0 % 1.0     CK - MB Latest Ref Range: <3.6 ng/ml 1.1       X-ray left foot:1-  IMPRESSION:     Osseous erosive changes of the posterior calcaneus with overlying soft tissue  swelling and skin ulceration compatible with osteomyelitis.     1-  Left foot x-ray    IMPRESSION:     Posterior left calcaneal osteotomy for osteomyelitis with expected postoperative  changes in adjacent soft tissues

## 2020-01-31 NOTE — OP NOTES
[facility]  [facility]    Name:  Luis Zamudio  MR#:   [id number]  :  1970  ACCOUNT #:  [de-identified]  DATE OF SERVICE:  2020    PREOPERATIVE DIAGNOSIS:  Diabetic left foot infection with osteomyelitis of the left heel. POSTOPERATIVE DIAGNOSES:  Diabetic left foot infection with osteomyelitis of the left heel and Achilles tendon rupture. PROCEDURE PERFORMED:  1. Aggressive debridement of calcaneus (partial calcanectomy). 2.  Bone biopsy. 3.  Incision and drainage. SURGEON:  Marbella Light DPM.    ASSISTANT:  nurse    ANESTHESIA:  MAC with local.    COMPLICATIONS:  None. SPECIMENS REMOVED:  bone. IMPLANTS:  None. ESTIMATED BLOOD LOSS:  20 mL. PROCEDURE:  The patient was taken to the OR, placed in a supine position on the operating table where local and IV sedation were achieved. Local anesthesia consisted of 35 mL of 0.5% Marcaine plain and 10 mL of 1% lidocaine plain. The left lower extremity was prepped and draped in the usual sterile manner. A padding was placed on the left calf and a tourniquet was placed at 250 mmHg. The patient in the supine position, utilizing a standard 15 blade, a curvilinear hockey stick incision was made along the posterior medial aspect of the Achilles tendon down to the heel and along the edge of the medial side of the plantar foot. Once through full-skin thickness, bleeders were ligated as necessary with blunt and sharp dissection. It was observed that the Achilles tendon had completely exploded off the calcaneus, and it was also found that the calcaneus had eroded to the point where there was a hole from the posterior aspect going into the central part of the heel in which I could stick my index finger in. At this point, there was purulent drainage as well, approximately 5-10 mL. The wound was cultured. There was not much of a foul odor.   At this point, utilizing the C-arm guidance and a sagittal saw and the battery-operated Simpler saw combination was used to take up approximately 50% of the heel and then the post plantar posterior edge was rasped smooth and rounded. The wound was then flushed with 3000 mL of normal saline and bacitracin added. Next, two semi-elliptical incisions were made on the plantar heel ulcer, which measured 2.8 cm x 2.8 cm full-skin thickness and at this point, after the pulse lavage, we used 2-0 nylon to reapproximate the wound, closed, and then we initially placed a drain in the posterior Achilles tendon region and then pulled it and then packed that area with one-fourth inch iodoform gauze and bunch of ABD's, 4 x 4's with Betadine, and Kerlix. The patient tolerated the procedure and anesthesia well and was taken to recovery room.         HIPOLITO [email or web address]/[email or web address]  D:  01/30/2020 15:26  T:  01/31/2020 2:22  JOB #:  [id number]

## 2020-01-31 NOTE — PROGRESS NOTES
Assessment:     ESRD  DM  HTN  Anemia of CKD  Foot ulcer     Plan:    HD TODAY  Left foot ulcer per primary team   HOLD ALL bp MEDS FOR NOW         CC: ESRD, has pain,   Interval History: had I and D no other new issues, BP are low      Subjective:   No change since i saw pt last. No new symptoms or issues. Review of Systems:  Negative for Edema, SOB        Blood pressure 100/46, pulse 92, temperature 97.6 °F (36.4 °C), resp. rate 18, height 5' 7\" (1.702 m), weight 83.9 kg (184 lb 15.5 oz), SpO2 97 %.       awake and alert   NAD      Intake/Output Summary (Last 24 hours) at 1/31/2020 1304  Last data filed at 1/31/2020 0737  Gross per 24 hour   Intake 1456.25 ml   Output 20 ml   Net 1436.25 ml      Recent Labs     01/31/20  0220   WBC 26.2*     Lab Results   Component Value Date/Time    Sodium 139 01/31/2020 02:20 AM    Potassium 4.0 01/31/2020 02:20 AM    Chloride 102 01/31/2020 02:20 AM    CO2 29 01/31/2020 02:20 AM    Anion gap 8 01/31/2020 02:20 AM    Glucose 99 01/31/2020 02:20 AM    BUN 44 (H) 01/31/2020 02:20 AM    Creatinine 9.19 (H) 01/31/2020 02:20 AM    BUN/Creatinine ratio 5 (L) 01/31/2020 02:20 AM    GFR est AA 8 (L) 01/31/2020 02:20 AM    GFR est non-AA 6 (L) 01/31/2020 02:20 AM    Calcium 7.7 (L) 01/31/2020 02:20 AM        Current Facility-Administered Medications   Medication Dose Route Frequency Provider Last Rate Last Dose    promethazine (PHENERGAN) 25 mg in 0.9% sodium chloride 50 mL IVPB  25 mg IntraVENous Q4H PRN Teule-Hekima, Darylene Flesher, MD        morphine 10 mg/ml injection 4 mg  4 mg IntraVENous Q3H PRN Edvin Arambula MD   4 mg at 01/30/20 1916    sodium chloride 0.45 % in dextrose 10% 1,019.6 mL infusion   IntraVENous CONTINUOUS Drew Rodriguez MD 75 mL/hr at 01/31/20 0737      0.9% sodium chloride infusion 500 mL  500 mL IntraVENous CONTINUOUS Lori Montoya MD   500 mL at 01/30/20 1310    sodium chloride (NS) flush 5-40 mL  5-40 mL IntraVENous Q8H Mayte Nam DPM   10 mL at 01/31/20 0559    sodium chloride (NS) flush 5-40 mL  5-40 mL IntraVENous PRN Jen Carpentert, DPM        acetaminophen (TYLENOL) tablet 650 mg  650 mg Oral Q6H MawJen fajardot, DPM   650 mg at 01/31/20 0603    HYDROmorphone (DILAUDID) tablet 2 mg  2 mg Oral Q4H PRN Mawscotty, Jent, DPM        HYDROmorphone (PF) (DILAUDID) injection 1 mg  1 mg IntraVENous Q4H PRN MaJen crookst, DPM        naloxone (NARCAN) injection 0.4 mg  0.4 mg IntraVENous PRN Pauline, Luis Santillan, DPM        influenza vaccine 2019-20 (6 mos+)(PF) (FLUARIX/FLULAVAL/FLUZONE QUAD) injection 0.5 mL  0.5 mL IntraMUSCular PRIOR TO DISCHARGE Miguel Angel Daigle MD        insulin lispro (HUMALOG) injection   SubCUTAneous AC&HS Flores-Eva Angulo MD   Stopped at 01/30/20 2200    acetaminophen (TYLENOL) tablet 650 mg  650 mg Oral Q4H PRN Miguel Angel Daigle MD   650 mg at 01/29/20 2017    naloxone [facility]) injection 0.4 mg  0.4 mg IntraVENous PRN Miguel Angel Daigle MD        diphenhydrAMINE (BENADRYL) injection 12.5 mg  12.5 mg IntraVENous Q4H PRN Miguel Angel Daigle MD        ondansetron [facility]) injection 4 mg  4 mg IntraVENous Q4H PRN Miguel Angel Daigle MD   4 mg at 01/30/20 0150    heparin (porcine) injection 5,000 Units  5,000 Units SubCUTAneous Q8H Miguel Angel Daigle MD   5,000 Units at 01/31/20 5088    hydrALAZINE (APRESOLINE) 20 mg/mL injection 10 mg  10 mg IntraVENous Q6H PRN Miguel Angel Daigle MD        piperacillin-tazobactam (ZOSYN) 2.25 g in 0.9% sodium chloride (MBP/ADV) 50 mL MBP  2.25 g IntraVENous Q8H Miguel Angel Daigle  mL/hr at 01/31/20 0942 2.25 g at 01/31/20 0942    levoFLOXacin (LEVAQUIN) 500 mg in D5W IVPB  500 mg IntraVENous Q48H Miguel Angel Daigle  mL/hr at 01/31/20 1108 500 mg at 01/31/20 1108    [Held by provider] carvediloL (COREG) tablet 25 mg  25 mg Oral BID WITH MEALS Miguel Angel Daigle MD   Stopped at 01/30/20 1006    [Held by provider] isosorbide mononitrate ER (IMDUR) tablet 60 mg  60 mg Oral DAILY Miguel Angel Daigle MD       [facility] by provider] hydrALAZINE (APRESOLINE) tablet 25 mg  25 mg Oral TID Дмитрий Vidal MD   Stopped at 01/29/20 2200    clindamycin (CLEOCIN) 600mg NS 50 mL IVPB (premix)  600 mg IntraVENous Q8H Дмитрий Vidal MD   600 mg at 01/31/20 0603    pantoprazole (PROTONIX) 40 mg in 0.9% sodium chloride 10 mL injection  40 mg IntraVENous DAILY Дмитрий Vidal MD   40 mg at 01/31/20 1141

## 2020-01-31 NOTE — PROGRESS NOTES
PT note: evaluation attempted. HD in progress at this time. Will f/up tomorrow. Thank you.   Servando Murphy, PT

## 2020-01-31 NOTE — PROGRESS NOTES
DC Plan: TBD    Chart reviewed. Pt admitted to medical by hospitalist. ID consult noted. Provider please advise if IV abx needed at time of discharge . [facility]. Will need PT eval and recommendations after surgery to assist with dc planning. Met with pt at bedside, no friend/family present. Pt states he lives alone but has family nearby. States friend/family will drive him home at discharge. Pt states he takes medicaid transport to appts. Home address confirmed per face sheet. Pt states he has cane, RW at home for use as needed. Pt goes to dialysis TTS. Pt denies being active with [facility] services. Pts listed PCP is MD Friend at [facility], however, he does not want to go back to [facility]. CMS will assist with new PCP referral and needed follow up appts. No immediate dc concerns identified. CM will cont to follow for transition of care needs () and will be available via hospital  on weekends.

## 2020-01-31 NOTE — PROGRESS NOTES
Bedside and Verbal shift change report given to Saumya Ramirez RN (oncoming nurse) by Blake Farmer RN (offgoing nurse). Report included the following information SBAR, Kardex, Intake/Output, MAR and Recent Results. 2996- Physician paged due to low BP; Dr. Familia Mcghee gave orders for 500 ml bolus. 0845- Bolus infusing, manual BP 98/58. MD notified. 1245- Patient complaining of pain; educated patient that pain medication can not be given due to low BP. Patient verbalizes understanding. 1420- Patients IV leaking, removed. 1440- Patient receiving HD, will attempt to restart IV once completed. 1900- Patient completed dialysis, +1 Liter after dialysis. 1940-Bedside and Verbal shift change report given to Ibeth Lux RN (oncoming nurse) by Saumya Ramirez RN (offgoing nurse). Report included the following information SBAR, Kardex, Procedure Summary, Intake/Output, MAR and Recent Results.

## 2020-02-01 LAB
1,5-ANHYDROGLUCITOL SERPL-MCNC: 1.3 UG/ML
ANION GAP SERPL CALC-SCNC: 7 MMOL/L (ref 3–18)
BACTERIA SPEC CULT: ABNORMAL
BACTERIA SPEC CULT: ABNORMAL
BASOPHILS # BLD: 0 K/UL (ref 0–0.1)
BASOPHILS NFR BLD: 0 % (ref 0–3)
BUN SERPL-MCNC: 19 MG/DL (ref 7–18)
BUN/CREAT SERPL: 3 (ref 12–20)
CALCIUM SERPL-MCNC: 7.9 MG/DL (ref 8.5–10.1)
CHLORIDE SERPL-SCNC: 103 MMOL/L (ref 100–111)
CO2 SERPL-SCNC: 28 MMOL/L (ref 21–32)
CREAT SERPL-MCNC: 5.46 MG/DL (ref 0.6–1.3)
DIFFERENTIAL METHOD BLD: ABNORMAL
EOSINOPHIL # BLD: 0.3 K/UL (ref 0–0.4)
EOSINOPHIL NFR BLD: 1 % (ref 0–5)
ERYTHROCYTE [DISTWIDTH] IN BLOOD BY AUTOMATED COUNT: 19.6 % (ref 11.6–14.5)
GLUCOSE BLD STRIP.AUTO-MCNC: 103 MG/DL (ref 70–110)
GLUCOSE BLD STRIP.AUTO-MCNC: 105 MG/DL (ref 70–110)
GLUCOSE BLD STRIP.AUTO-MCNC: 106 MG/DL (ref 70–110)
GLUCOSE BLD STRIP.AUTO-MCNC: 145 MG/DL (ref 70–110)
GLUCOSE BLD STRIP.AUTO-MCNC: 159 MG/DL (ref 70–110)
GLUCOSE BLD STRIP.AUTO-MCNC: 74 MG/DL (ref 70–110)
GLUCOSE SERPL-MCNC: 168 MG/DL (ref 74–99)
GRAM STN SPEC: ABNORMAL
GRAM STN SPEC: ABNORMAL
HBA1C MFR BLD: 5 %HB
HCT VFR BLD AUTO: 26.3 % (ref 36–48)
HGB BLD-MCNC: 8.2 G/DL (ref 13–16)
LYMPHOCYTES # BLD: 0.8 K/UL (ref 0.8–3.5)
LYMPHOCYTES NFR BLD: 3 % (ref 20–51)
MAGNESIUM SERPL-MCNC: 1.7 MG/DL (ref 1.6–2.6)
MCH RBC QN AUTO: 24.3 PG (ref 24–34)
MCHC RBC AUTO-ENTMCNC: 31.2 G/DL (ref 31–37)
MCV RBC AUTO: 78 FL (ref 74–97)
MONOCYTES # BLD: 0.3 K/UL (ref 0–1)
MONOCYTES NFR BLD: 1 % (ref 2–9)
NEUTS SEG # BLD: 24.6 K/UL (ref 1.8–8)
NEUTS SEG NFR BLD: 95 % (ref 42–75)
PLATELET # BLD AUTO: 188 K/UL (ref 135–420)
PLATELET COMMENTS,PCOM: ABNORMAL
PMV BLD AUTO: 9 FL (ref 9.2–11.8)
POTASSIUM SERPL-SCNC: 3.7 MMOL/L (ref 3.5–5.5)
RBC # BLD AUTO: 3.37 M/UL (ref 4.7–5.5)
RBC MORPH BLD: ABNORMAL
SERVICE CMNT-IMP: ABNORMAL
SODIUM SERPL-SCNC: 138 MMOL/L (ref 136–145)
WBC # BLD AUTO: 26 K/UL (ref 4.6–13.2)
WBC MORPH BLD: ABNORMAL

## 2020-02-01 PROCEDURE — 80048 BASIC METABOLIC PNL TOTAL CA: CPT

## 2020-02-01 PROCEDURE — 36415 COLL VENOUS BLD VENIPUNCTURE: CPT

## 2020-02-01 PROCEDURE — 85025 COMPLETE CBC W/AUTO DIFF WBC: CPT

## 2020-02-01 PROCEDURE — 97163 PT EVAL HIGH COMPLEX 45 MIN: CPT

## 2020-02-01 PROCEDURE — 74011000250 HC RX REV CODE- 250: Performed by: HOSPITALIST

## 2020-02-01 PROCEDURE — 74011000258 HC RX REV CODE- 258: Performed by: HOSPITALIST

## 2020-02-01 PROCEDURE — 65270000029 HC RM PRIVATE

## 2020-02-01 PROCEDURE — 83735 ASSAY OF MAGNESIUM: CPT

## 2020-02-01 PROCEDURE — C9113 INJ PANTOPRAZOLE SODIUM, VIA: HCPCS | Performed by: HOSPITALIST

## 2020-02-01 PROCEDURE — 82962 GLUCOSE BLOOD TEST: CPT

## 2020-02-01 PROCEDURE — 74011636637 HC RX REV CODE- 636/637: Performed by: FAMILY MEDICINE

## 2020-02-01 PROCEDURE — 83036 HEMOGLOBIN GLYCOSYLATED A1C: CPT

## 2020-02-01 PROCEDURE — 74011250636 HC RX REV CODE- 250/636: Performed by: HOSPITALIST

## 2020-02-01 PROCEDURE — 74011250637 HC RX REV CODE- 250/637: Performed by: PODIATRIST

## 2020-02-01 RX ADMIN — Medication 10 ML: at 06:21

## 2020-02-01 RX ADMIN — ACETAMINOPHEN 650 MG: 325 TABLET, FILM COATED ORAL at 06:18

## 2020-02-01 RX ADMIN — PIPERACILLIN AND TAZOBACTAM 2.25 G: 2; .25 INJECTION, POWDER, LYOPHILIZED, FOR SOLUTION INTRAVENOUS at 09:49

## 2020-02-01 RX ADMIN — Medication 10 ML: at 16:57

## 2020-02-01 RX ADMIN — HEPARIN SODIUM 5000 UNITS: 5000 INJECTION INTRAVENOUS; SUBCUTANEOUS at 16:56

## 2020-02-01 RX ADMIN — SODIUM CHLORIDE 40 MG: 9 INJECTION, SOLUTION INTRAMUSCULAR; INTRAVENOUS; SUBCUTANEOUS at 09:48

## 2020-02-01 RX ADMIN — ACETAMINOPHEN 650 MG: 325 TABLET, FILM COATED ORAL at 01:15

## 2020-02-01 RX ADMIN — INSULIN LISPRO 2 UNITS: 100 INJECTION, SOLUTION INTRAVENOUS; SUBCUTANEOUS at 16:58

## 2020-02-01 RX ADMIN — HEPARIN SODIUM 5000 UNITS: 5000 INJECTION INTRAVENOUS; SUBCUTANEOUS at 09:49

## 2020-02-01 RX ADMIN — PIPERACILLIN AND TAZOBACTAM 2.25 G: 2; .25 INJECTION, POWDER, LYOPHILIZED, FOR SOLUTION INTRAVENOUS at 02:16

## 2020-02-01 RX ADMIN — HEPARIN SODIUM 5000 UNITS: 5000 INJECTION INTRAVENOUS; SUBCUTANEOUS at 01:16

## 2020-02-01 RX ADMIN — PIPERACILLIN AND TAZOBACTAM 2.25 G: 2; .25 INJECTION, POWDER, LYOPHILIZED, FOR SOLUTION INTRAVENOUS at 16:56

## 2020-02-01 RX ADMIN — ACETAMINOPHEN 650 MG: 325 TABLET, FILM COATED ORAL at 18:55

## 2020-02-01 RX ADMIN — ACETAMINOPHEN 650 MG: 325 TABLET, FILM COATED ORAL at 12:06

## 2020-02-01 NOTE — PROGRESS NOTES
2215 - Informed by [facility] that pt's blood sugar 43, repeat 49. Went to check on pt immediately and found him resting in bed using lap top asymptomatic. 4oz juice given per protocol and was able to drink all.    2249 - Blood sugar 49  post treatment with 4oz juice . Another 4oz juice given and pt was able to drink all. Remains asymptomatic and still using lap top .     2303 - Blood sugar 50 post treatment with second 4oz juice. Remains asymptomatic. States \"just give it some time. ..it will get back up\" . Resting quietly in bed. Another 4oz juice given but pt wanting to take sips for now since feeling full from the total 8oz juice already given. Hospitalist,  Maryam 7801 notified and made aware of hypoglycemic episode with no improvement from interventions carried out per protocol. Received order to give D10 IV per protocol. 2341 - Blood sugar 64 after 3rd oz juice given. Pt remains asymptomatic, resting in bed asleep, easily aroused to verbal stimuli. Ox3. D10 IV 125ml infusion initiated per hypoglycemic protocol . 0005 - D10 IV infusion completed. Blood sugar to be checked in 15min per protocol. On coming nurse Nuria Foote made aware. Pt asleep in bed, easily aroused to verbal stimuli, remains asymptomatic. Bedside and Verbal shift change report given to Nuria Foote (oncoming nurse) by Shawanda Tirado (offgoing nurse). Report included the following information SBAR, Kardex, MAR and Recent Results.

## 2020-02-01 NOTE — PROGRESS NOTES
0745  Bedside and Verbal shift change report given to [address] (oncoming nurse) by Prema Villar RN (offgoing nurse). Report included the following information SBAR, Kardex, Intake/Output, MAR and Recent Results. 1200  Pt seen by Dr. Sanford Amezquita today. Administered wound care and change of dressing. 1530  Changed dressing on foot wound due to breakthrough draining. Dressing is clean and dry. Bedside and Verbal shift change report given to 2316 Saul Maciel Edil (oncoming nurse) by Ozzie Stacy RN (offgoing nurse). Report included the following information SBAR, Kardex, Intake/Output, MAR and Recent Results.

## 2020-02-01 NOTE — DIALYSIS
TREATMENT SUMMARY   Patient dialyzed in room 304  Tolerated treatment with asymptomatic hypotension. Patient without complaint and AOx4  LUE AVG difficult to access on lower graft pt states hx of clots  Post HD heavy clotting noted in arterial and venous chambers of circuit.     500 ml removed via UF with a with a net positive  UFof +1000 ml ( fluid positive)  Report given to Jonnathan Mayers RN with all questions answered     TREATMENT  NOTES      1500 patient hypotensive pre-d with BP in uppers 70s and low 80s. MD Landry orders 1 L NS saline to be given at the start of treatment.                                                                                                  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       Code Status [x]Full Code [] DNR  [] Other_____   Diet: [x] Renal [] NPO [] Diabetic   [] Enteral Feeding [] Cardiac Diabetic: []Yes [x]No     [x]Signed Treatment Consent Verified   [x] Time Out/ Safety Check   PRIMARY NURSE REPORT: FIRST INITIAL/ LAST NAME/TITLE  PRE DIALYSIS: Jonnathan Mayers RN            TIME: [address]   ACCESS   CATHETER ACCESS: [x] N/A  [] RIGHT  [] LEFT  [] IJ  [] SUBCL [] FEM                    [] First use X-ray  [] Tunnel     [] Non-Tunneled      [] No S/S infection  [] Redness [] Drainage  [] Cultured [] Swelling [] Pain                    [] Medical Aseptic [] Prep Dressing Changed                  [] Clotted [] Patent []      Flows: [] Good [] Poor [] Reversed                 If Access Problem Dr. Celine Mejia: [] Yes [] No    Date:_____ [] N/A[]   GRAFT/FISTULA ACCESS:  [] N/A  [] RIGHT  [x] LEFT  [x] UE   [] LE       [x] AVG  [] AVF [] BUTTONHOLE    [x] +BRUIT/THRILL [x] MEDICAL ASEPTIC PREP     [x] No S/S infection  [] Redness [] Drainage  [] Cultured [] Swelling [] Pain              If Access Problem Dr. Angelia Barry: [] Yes [] No    Date:______ [] N/A   GENERAL ASSESSMENT   LUNGS:  SaO2% __98__ [x] Clear [] Coarse [] Crackles [] Wheezing                                         [] Diminished   Location: [] RLL [] LLL [] RUL [] RML [] KYLAH    COUGH:  [] Productive  [] Loose[] Dry [x] N/A   RESPIRATIONS: [x] Easy []Labored    THERAPY: [x] RA   [] NC _____. L/min    Mask: [] NRB [] Venti  _____O2%                  [] Ventilator [] Intubated [] Trach [] BiPap [] CPap [] HI Flow   CARDIAC: [x] Regular [] Irregular [] Pericardial Rub [] JVD               Monitored Rhythm:__not monitored____ [x] N/A   EDEMA: [x] None [] Generalized [] Facial [] Pedal [] UE [] LE             [] Pitting [] 1 [] 2 [] 3 [] 4    [] Right [] Left [] Bilateral   SKIN:    [x] Warm [] Hot [] Cold  [x] Dry [] Pale [] Diaphoretic              [] Flushed [] Jaundiced [] Cyanotic [] Rash [] Weeping     LOC:    [x] Alert  Oriented to: [x] Person [x] Place [x] Time             [] Confused [] Lethargic [] Medicated [] Non-responsive    GI/ABDOMEN: [] Flat [] Distended [x] Soft [] Firm [] Diarrhea [x] Bowel Sounds Present [] Nausea [] Vomiting    PAIN: [] 0 [] 1 [x] 2 [] 3 [] 4 [] 5 [] 6 [] 7 [] 8 [] 9 [] 10          Scale 1-10 Action/Follow Up_____   MOBILITY: [] Amb [] Amb/Assist [x] Bed  [] Wheelchair    CURRENT LABS   HBsAg ONLY: Date Drawn: 1/29/2020            [x] Negative [] Positive [] Unknown.      HBsAb: Date Drawn: 1/29/2020             [] Susceptible <10 [x] Immune ?10 [] Unknown   Date of Current Labs:  ATTACHED     EDUCATION   Person Educated: [x] Patient [] Other_________   Knowledge base: [] None [x] Minimal [] Substantial    Barriers to learning  [x] None _______________ Preferred method of learning: [] Written [x] Oral [x] Visual [] Hands on    Topic: [x] Access Care [] S&S of infection [x] Fluid Management   [] K+  [x] Procedural  [] Albumin [] Medications [] Tx Options   [] Transplant [] Diet [] Other    Teaching Tools: [x] Explain [x] Demonstration [] Handout_____ [] Video______  CARE PLAN    [x] Renal Failure (Adult)  Interdisciplinary  · Fluid and electrolytes stabilized  ? Interventions  · Dehydration signs and symptoms (eg: Weight/lab monitoring; vomiting/diarrhea/urine; tenting; mucous membranes; dizziness/lethargy/irritability/confusion; weak pulse; tachycardia; blood pressure; I&O)  · Fluid overload signs and symptoms assessment (eg: Body weight increased; dyspnea; edema; hypertension; respiratory crackles/wheezing; JVD; lab monitoring; mental status changes; I&O)  · Monitor appropriate lab values  · COMPLIANCE WITH PRESCRIBED THERAPY  · ARTERIAL ACCESS SITE ASSESSMENT  · NUTRITION SCREENING  · Vital signs monitoring per assessed patient condition or unit standard  · Cardiac monitoring  · Hydration management  · Intake and output measurement  · Body weight monitoring  · Skin care  · DIALYSIS  · Nutrition Care Process per nutrition screen  · Oral hygiene care every 2 hours  · Pain management     · Outcome   ? [x] Progressing Towards Goal  ? [] Not Progressing Towards Goals  ? [] Goals Met/Resolved  ? [] Goals Not Met/ Resolved        · Patient/ Family Education  ?  Progressing Towards Goals          RO/HEMODIAYLSIS MACHINE SAFETY CHECKS- BEFORE EACH TREATMENT          [x] [facility]: Machine Serial #1:  [id number]   RO Serial #[id number]                       [] [facility]: Machine Serial #2:  [id number]   RO Serial #[id number]    Alarm Test: [x] Pass  Time___1445_____  [x] RO/Machine Log Complete    [x] Extracorporeal circuit Tested for integrity           Dialyzer_[id number]_________   Tubing__19I20-8__________    Dialysate: pH__7.4_  Temp.__37___Conductivity: Meter __14__ HD Machine__13.8_   CHLORINE TESTING- BEFORE EACH TREATMENT AND EVERY 4 HOURS   Total Chlorine: [x] Less than 0.1 ppm Time:__1515___2nd Check Time:__na____  (If greater than 0.1 ppm from Primary then every 30 minutes from Secondary)   TREATMENT INIATION-WITH DIALYSIS PRECAUTIONS   [x] All Connections Secured   [x] Saline Line Double Clamped    [x] Venous Parameters Set [x] Arterial Parameters Set    [x] Prime Given 250 ml     [x] Air Foam Detector Engaged   PRE-TREATMENT   UF Calculations: Wt to lose:_0___ml(+) Oral:_0_ml(+)IV Meds/Fluids/Blood prods_0__ml(+) Prime/Rinse__500_ml(=)Total UF Goal_500_mL   Scale Type:[x] Bed scale [] Sling Scale [] Wheel Chair Scale []  Not Ordered [] [] Unable to obtain pt on stretcher/ bed scale malfunctioning   Tx Initiation Note: RECEIVED REPORT. PATIENT ALERT AND ORIENTED. NAD. ALL QUESTIONS ANSWERED WITH PATIENT  SAFETY CHECKS COMPLETE. TIME OUT COMPLETE. TREATMENT INITIATED VIA __AVG___. NO CONCERNS NOTED. [x] Time Out/Safety Check  Time:_1520____   INTRADIALYTIC MONITORING  (SEE ATTACHED FLOWSHEET)     POST TREATMENT    Time Medication Dose Volume Route    Initials   1525 albumin 25g 100ml avg pap   1635 albumin 25g 100ml avg pap   1735 albumin 25g 100ml avg pap                   [facility] Signatures Title Initials Time   STEPHEN ZHOU RN PAP 1930               Dialyzer cleared: [x] Good [] Fair [] Poor     Blood Volume Processed __62.9__L   Net UF Removed __0__mL  Post Tx Access:                  AVF/AVG: Bleeding Stop Time      Art.   6_min Chaim._6_NA_min []+bruit/thrill                              Catheter: Locking Solution  [] Heparin 1 ml/1000 units                                                             [] Normal Saline                                                                      Art._____ ml Chaim._____ml                                                           [x] New caps placed  Post Assessment:              Skin: [x]Warm [x]Dry []Diaphoretic []Flushed [] Pale []Cyanotic            Lungs: [x]Clear []Coarse []Crackles []Wheezing             Cardiac: [x]Regular []Irregular  []Monitored rhythm____ []N/A            Edema: [x]None []General []Facial []Pedal  []UE []LE []RIGHT []LEFT            Pain: [x]0 []1 []2 []3 []4 []5 []6 []7 []8 []9 []10   POST Tx Note:TREATMENT COMPLETED. ALL POSSIBLE BLOOD RETURNED. PT TOLERATED WELL.  NAD. DE CANULATED ARTERIAL AND VENOUS SITES WITHOUT INCIDENT. REPORT GIVEN WITH OPPORTUNITY TO ADDRESS ANY CONCERNS OR QUESTTIONS AND PATIENT STAYS ROOM IN BED WITHOUT DISTRESS OR ACUTE DISCOMFORT. BED LOWED, CALL BELL WITHIN REACH .    Primary Nurse Report: First initial/Last name/Title    Post Dialysis:__Fabian Ramey RN_____         Time:___1905_____________    Abbreviations: AVG-arterial venous graft, AVF-arterial venous fistula, IJ-Internal Jugular,  Subcl-Subclavian, Fem-Femoral, Tx-treatment, AP/HR-apical heart rate, DFR-dialysate flow rate, BFR-blood flow rate, AP-arterial pressure, -venous pressure, UF-ultrafiltrate, TMP-transmembrane pressure, Chaim-Venous, Art-Arterial, RO-Reverse Osmosis

## 2020-02-01 NOTE — PROGRESS NOTES
[facility], [address] [place] [address] Sarah Mathis, [address]  [place], [address] [place] [address]  [phone](P)  [phone](G)  [phone](A)       A/  Osteomyelitis left foot heel/ESRD    Plan:  1. Wound of left foot was flush with 100cc's of a tea color diluted normal saline and betadine solution. 2. I packed the wound with 1/2\" iodoform gauze, applied 4x4's and kerlix. 3. Continue antimicrobials per Dr Severo Gaster. 4. Continue non wt bearing, but may sit up in chair. 5. If this heals, he will need a custom AFO brace to ambulate in the future if this heels. S/  Patient is status post aggressive limb salvage attempt:partial calc-canectomy. He says he feels overall better ever since surgery. Although low energy and less feeling of vomiting. O/  Alert & oriented with lethargy  Status post day 2: aggressive limb salvage attempt:partial calcanectomy left foot. 98.9  98.9 (37.2)           Pulse (Heart Rate)  96  96        Resp Rate 16-18 17  17        BP 79//57 137/53  137/53        O2 Sat (%) (%)  99  99                     Results for Daivd Grams (MRN [id number]) as of 2/1/2020 11:29   Ref.  Range 1/30/2020 05:00 1/31/2020 02:20 2/1/2020 06:49   WBC Latest Ref Range: 4.6 - 13.2 K/uL 23.3 (H) 26.2 (H) 26.0 (H)   RBC Latest Ref Range: 4.70 - 5.50 M/uL 4.04 (L) 3.89 (L) 3.37 (L)   HGB Latest Ref Range: 13.0 - 16.0 g/dL 9.8 (L) 9.4 (L) 8.2 (L)   HCT Latest Ref Range: 36.0 - 48.0 % 32.2 (L) 30.5 (L) 26.3 (L)   MCV Latest Ref Range: 74.0 - 97.0 FL 79.7 78.4 78.0   MCH Latest Ref Range: 24.0 - 34.0 PG 24.3 24.2 24.3   MCHC Latest Ref Range: 31.0 - 37.0 g/dL 30.4 (L) 30.8 (L) 31.2   RDW Latest Ref Range: 11.6 - 14.5 % 19.9 (H) 19.8 (H) 19.6 (H)   PLATELET Latest Ref Range: 135 - 420 K/uL 212 205 188   MPV Latest Ref Range: 9.2 - 11.8 FL 9.5 9.3 9.0 (L)   NEUTROPHILS Latest Ref Range: 42 - 75 % 88 (H) 73 95 (H)   BAND NEUTROPHILS Latest Ref Range: 0 - 5 % 3 17 (H) LYMPHOCYTES Latest Ref Range: 20 - 51 % 5 (L) 1 (L) 3 (L)   MONOCYTES Latest Ref Range: 2 - 9 % 4 8 1 (L)   EOSINOPHILS Latest Ref Range: 0 - 5 % 0 1 1   BASOPHILS Latest Ref Range: 0 - 3 % 0 0 0   DF Latest Units:   MANUAL MANUAL MANUAL   ABS.  NEUTROPHILS Latest Ref Range: 1.8 - 8.0 K/UL 22.0 (H) 19.1 (H) 24.6 (H)

## 2020-02-01 NOTE — PROGRESS NOTES
0006-Resting in bed. Stable. Call light and personal items within reach. 0026-Resting in bed. Stable. Drowsy, hard to keep aroused. Patient had low blood glucose levels prior to and at shift change report. Blood glucose to be rechecked; Bolus of dextrose 10%,125 mL administered by off going nurse. Ace wrap, left foot, intact, no drain. Right BKA, well approximated. Incontinent brief in place. Left upper arm fistula, positive bruit and thrill. [place] with paper tape noted to left upper arm. Assessment complete. 0031-Blood glucose now 103. Patient more alert, stable. 0610-Stable, still drowsy. Patient pulled gauze and tape off that was in place to left upper arm. Resting quietly in bed. Redness noted to buttocks and scrotum area related to frequent stools. No change from initial assessment. Shift Summary:  Stable at shift change. Blood glucose above 100 and less than 150. Resting quietly.

## 2020-02-01 NOTE — PROGRESS NOTES
Hospitalist Progress Note    Patient: Shane Avila. MRN: [id number]  CSN: [id number]    YOB: 1970  Age: 54 y.o. Sex: male    DOA: 1/29/2020 LOS:  LOS: 3 days            Assessment/Plan     1.sepsis  2. Cellulitis & abscess of foot w acute osteomyelitis sp I&D  3. ESRD on HD  4. Hypotension- improving  5. R BKA    Plan:  - abx changed to daptomycin  - HD per nephrology  - local wound care    Patient Active Problem List   Diagnosis Code    Hyponatremia E87.1    Anemia of chronic disease D63.8    Acute on chronic renal insufficiency N28.9, N18.9    Acute exacerbation of CHF (congestive heart failure) ([facility]) I50.9    Iron deficiency anemia D50.9    Other restrictive cardiomyopathy ([facility] I42.5    Dehydration E86.0    ESRD on hemodialysis ([facility] N18.6, Z99.2    Cellulitis and abscess of foot L03.119, L02.619    HTN (hypertension) I10    Acute osteomyelitis ([facility] M86.10    Hx of BKA, right ([facility] Z89.511    Esophagitis K20.9    Hypoglycemia E16.2               Subjective:    cc: \" Im tired\":  Pain in foot controlled  BP improved      REVIEW OF SYSTEMS:  General: No fevers or chills. Cardiovascular: No chest pain or pressure. No palpitations. Pulmonary: No shortness of breath. Gastrointestinal: No nausea, vomiting. Objective:        Vital signs/Intake and Output:  Visit Vitals  /53 (BP 1 Location: Right arm, BP Patient Position: At rest)   Pulse 96   Temp 98.9 °F (37.2 °C)   Resp 17   Ht 5' 7\" (1.702 m)   Wt 83.9 kg (184 lb 15.5 oz)   SpO2 99%   BMI 28.97 kg/m²     Current Shift:  No intake/output data recorded. Last three shifts:  01/30 1901 - 02/01 0700  In: 1656.3 [P.O.:360; I.V.:1296.3]  Out: 0     Body mass index is 28.97 kg/m².     Physical Exam:  GEN: Alert and oriented times three NAD  EYES: conjunctiva normal, lids with out lesions  HEENT: MMM, No thyromegaly, no lymphadenopathy  HEART: RRR +S1 +S2, no JVD, pulses 2+ distally  LUNGS: CTA B/L no wheezes, rales or rhonchi  ABDOMEN: + BS, soft NT/ND no organomegaly,  No rebound  EXTREMITIES: No edema cyanosis, cap refill normal, BKA, + foot bandaged cdi   SKIN: no rashes or skin breakdown, no nodules, normal turgor  Current Facility-Administered Medications   Medication Dose Route Frequency    albumin human 25% (BUMINATE) solution 25 g  25 g IntraVENous DIALYSIS PRN    DAPTOmycin (CUBICIN) 500 mg in 0.9% sodium chloride 50 mL IVPB RF formulation  500 mg IntraVENous 3 times weekly    glucose chewable tablet 16 g  4 Tab Oral PRN    glucagon (GLUCAGEN) injection 1 mg  1 mg IntraMUSCular PRN    dextrose 10% infusion 125-250 mL  125-250 mL IntraVENous PRN    promethazine (PHENERGAN) 25 mg in 0.9% sodium chloride 50 mL IVPB  25 mg IntraVENous Q4H PRN    morphine 10 mg/ml injection 4 mg  4 mg IntraVENous Q3H PRN    sodium chloride 0.45 % in dextrose 10% 1,019.6 mL infusion   IntraVENous CONTINUOUS    sodium chloride (NS) flush 5-40 mL  5-40 mL IntraVENous Q8H    sodium chloride (NS) flush 5-40 mL  5-40 mL IntraVENous PRN    acetaminophen (TYLENOL) tablet 650 mg  650 mg Oral Q6H    HYDROmorphone (DILAUDID) tablet 2 mg  2 mg Oral Q4H PRN    HYDROmorphone (PF) (DILAUDID) injection 1 mg  1 mg IntraVENous Q4H PRN    naloxone (NARCAN) injection 0.4 mg  0.4 mg IntraVENous PRN    influenza vaccine 2019-20 (6 mos+)(PF) (FLUARIX/FLULAVAL/FLUZONE QUAD) injection 0.5 mL  0.5 mL IntraMUSCular PRIOR TO DISCHARGE    insulin lispro (HUMALOG) injection   SubCUTAneous AC&HS    acetaminophen (TYLENOL) tablet 650 mg  650 mg Oral Q4H PRN    naloxone (NARCAN) injection 0.4 mg  0.4 mg IntraVENous PRN    diphenhydrAMINE (BENADRYL) injection 12.5 mg  12.5 mg IntraVENous Q4H PRN    ondansetron (ZOFRAN) injection 4 mg  4 mg IntraVENous Q4H PRN    heparin (porcine) injection 5,000 Units  5,000 Units SubCUTAneous Q8H    hydrALAZINE (APRESOLINE) 20 mg/mL injection 10 mg  10 mg IntraVENous Q6H PRN    piperacillin-tazobactam (ZOSYN) 2.25 g in 0.9% sodium chloride (MBP/ADV) 50 mL MBP  2.25 g IntraVENous Q8H    [Held by provider] carvediloL (COREG) tablet 25 mg  25 mg Oral BID WITH MEALS    [Held by provider] isosorbide mononitrate ER (IMDUR) tablet 60 mg  60 mg Oral DAILY    [Held by provider] hydrALAZINE (APRESOLINE) tablet 25 mg  25 mg Oral TID    pantoprazole (PROTONIX) 40 mg in 0.9% sodium chloride 10 mL injection  40 mg IntraVENous DAILY         All the patient's labs over the past 24 hours were reviewed both during my initial daily workflow process and at the time notated as \"note time\" in [facility]. (It is not time stamped separately in this workflow.)  Select labs are listed below.         Labs: Results:       Chemistry Recent Labs     01/31/20  0220 01/30/20  0500   GLU 99 39*    142   K 4.0 3.9    104   CO2 29 30   BUN 44* 35*   CREA 9.19* 8.05*   CA 7.7* 7.9*   AGAP 8 8   BUCR 5* 4*      CBC w/Diff Recent Labs     02/01/20  0649 01/31/20  0220 01/30/20  0500   WBC 26.0* 26.2* 23.3*   RBC 3.37* 3.89* 4.04*   HGB 8.2* 9.4* 9.8*   HCT 26.3* 30.5* 32.2*    205 212   GRANS 95* 73 88*   LYMPH 3* 1* 5*   EOS 1 1 0                              Procedures/imaging: see electronic medical records for all procedures/Xrays and details which were not copied into this note but were reviewed prior to creation of [address] [place] [address] Rd, DO  Internal Medicine/Geriatrics

## 2020-02-01 NOTE — PROGRESS NOTES
0006-Resting in bed, eyes closed, wife at bedside. Stable. Call light and personal items within reach. 0026-Assessment complete. Positive dorsalis pedis pulse with doppler to bilateral lower extremities; patient tolerated well. Dressing, left foot, intact, no drainage. Call light and personal items within reach. No complaints. 0610-Stable. No change from initial assessment. Stable. No complaints. Shift Summary:  Rested quietly. Medicated per orders as needed for pain. Stable at shift change report.

## 2020-02-01 NOTE — ROUTINE PROCESS
Bedside and Verbal shift change report given to Fany Mckeon RN (oncoming nurse) by Carlitos Sevilla RN (offgoing nurse). Report included the following information SBAR and Kardex.

## 2020-02-01 NOTE — ROUTINE PROCESS
Bedside and Verbal shift change report given to Bella Posadas RN (oncoming nurse) by Vel Mar RN (offgoing nurse). Report included the following information SBAR and Kardex.

## 2020-02-01 NOTE — PROGRESS NOTES
Assessment:     ESRD  DM  HTN  Anemia of CKD  Foot ulcer     Plan:    HD  MWF  Left foot ulcer per primary team   HOLD ALL bp MEDS FOR NOW           CC: ESRD, has pain,   Interval History: had I and D no other new issues, BP are low        Subjective:   No change since i saw pt last. No new symptoms or issues.         Review of Systems:  Negative for Edema, SOB        Blood pressure 138/54, pulse 99, temperature 98.4 °F (36.9 °C), resp. rate 16, height 5' 7\" (1.702 m), weight 83.9 kg (184 lb 15.5 oz), SpO2 100 %.       awake and alert   NAD      Intake/Output Summary (Last 24 hours) at 2/1/2020 1358  Last data filed at 1/31/2020 2351  Gross per 24 hour   Intake 710 ml   Output 0 ml   Net 710 ml      Recent Labs     02/01/20  0649   WBC 26.0*     Lab Results   Component Value Date/Time    Sodium 139 01/31/2020 02:20 AM    Potassium 4.0 01/31/2020 02:20 AM    Chloride 102 01/31/2020 02:20 AM    CO2 29 01/31/2020 02:20 AM    Anion gap 8 01/31/2020 02:20 AM    Glucose 99 01/31/2020 02:20 AM    BUN 44 (H) 01/31/2020 02:20 AM    Creatinine 9.19 (H) 01/31/2020 02:20 AM    BUN/Creatinine ratio 5 (L) 01/31/2020 02:20 AM    GFR est AA 8 (L) 01/31/2020 02:20 AM    GFR est non-AA 6 (L) 01/31/2020 02:20 AM    Calcium 7.7 (L) 01/31/2020 02:20 AM        Current Facility-Administered Medications   Medication Dose Route Frequency Provider Last Rate Last Dose    [START ON 2/3/2020] DAPTOmycin (CUBICIN) 500 mg in water, bacteriostatic (WATER) 10 mL IV syringe RF formulation  500 mg IntraVENous Once per day on Mon Wed Fri Artem Gtz MD        albumin human 25% (BUMINATE) solution 25 g  25 g IntraVENous DIALYSIS PRN Yanet Landry R, DO   25 g at 01/31/20 7529    glucose chewable tablet 16 g  4 Tab Oral PRN Tyler Arambula MD        glucagon (GLUCAGEN) injection 1 mg  1 mg IntraMUSCular PRN Edvin Arambula MD        dextrose 10% infusion 125-250 mL  125-250 mL IntraVENous PRN Tyler Arambula  mL/hr at 01/31/20 2351 125 mL at 01/31/20 2351    promethazine (PHENERGAN) 25 mg in 0.9% sodium chloride 50 mL IVPB  25 mg IntraVENous Q4H PRN Randy Arambula MD        morphine 10 mg/ml injection 4 mg  4 mg IntraVENous Q3H PRN Randy Arambula MD   4 mg at 01/30/20 1916    sodium chloride 0.45 % in dextrose 10% 1,019.6 mL infusion   IntraVENous CONTINUOUS Shira Ortega MD 75 mL/hr at 01/31/20 0737      sodium chloride (NS) flush 5-40 mL  5-40 mL IntraVENous Q8H MaSam crooks, DPM   10 mL at 02/01/20 0621    sodium chloride (NS) flush 5-40 mL  5-40 mL IntraVENous PRN MawJen fajardot, DPM        acetaminophen (TYLENOL) tablet 650 mg  650 mg Oral Q6H MawJen fajardot, DPM   650 mg at 02/01/20 1206    HYDROmorphone (DILAUDID) tablet 2 mg  2 mg Oral Q4H PRN Sam Carpenter, HIPOLITO        HYDROmorphone (PF) (DILAUDID) injection 1 mg  1 mg IntraVENous Q4H PRN Sam Carpenter, HIPOLITO        naloxone (NARCAN) injection 0.4 mg  0.4 mg IntraVENous PRN Davey Carpenter, HIPOLITO        influenza vaccine 2019-20 (6 mos+)(PF) (FLUARIX/FLULAVAL/FLUZONE QUAD) injection 0.5 mL  0.5 mL IntraMUSCular PRIOR TO DISCHARGE Shira Ortega MD        insulin lispro (HUMALOG) injection   SubCUTAneous AC&HS Randy Arambula MD   Stopped at 01/30/20 2200    acetaminophen (TYLENOL) tablet 650 mg  650 mg Oral Q4H PRN Shira Ortega MD   650 mg at 01/29/20 2017    naloxone [facility]) injection 0.4 mg  0.4 mg IntraVENous PRN Shira Ortega MD        diphenhydrAMINE (BENADRYL) injection 12.5 mg  12.5 mg IntraVENous Q4H PRN Shira Ortega MD        ondansetron [facility]) injection 4 mg  4 mg IntraVENous Q4H PRN Shira Ortega MD   4 mg at 01/30/20 0150    heparin (porcine) injection 5,000 Units  5,000 Units SubCUTAneous Q8H Shira Ortega MD   5,000 Units at 02/01/20 0949    hydrALAZINE (APRESOLINE) 20 mg/mL injection 10 mg  10 mg IntraVENous Q6H PRN Shira Ortega MD        piperacillin-tazobactam (ZOSYN) 2.25 g in 0.9% sodium chloride (MBP/ADV) 50 mL MBP  2.25 g IntraVENous Q8H Deysi Barnard  mL/hr at 02/01/20 0949 2.25 g at 02/01/20 0949    [Held by provider] carvediloL (COREG) tablet 25 mg  25 mg Oral BID WITH MEALS Deysi Barnard MD   Stopped at 01/30/20 1006    [Held by provider] isosorbide mononitrate ER (IMDUR) tablet 60 mg  60 mg Oral DAILY Deysi Barnard MD       [facility] by provider] hydrALAZINE (APRESOLINE) tablet 25 mg  25 mg Oral TID Deysi Barnard MD   Stopped at 01/29/20 2200    pantoprazole (PROTONIX) 40 mg in 0.9% sodium chloride 10 mL injection  40 mg IntraVENous DAILY Deysi Barnard MD   40 mg at 02/01/20 9159

## 2020-02-01 NOTE — PROGRESS NOTES
Pt refused PT due to:  []  Nausea/vomiting  [x]  Eating  []  Pain  []  Pt lethargic  []  Off Unit  Will f/u later as schedule allows. Thank you.   Chelsea Lynch, PT

## 2020-02-02 ENCOUNTER — HOME HEALTH ADMISSION (OUTPATIENT)
Dept: HOME HEALTH SERVICES | Facility: HOME HEALTH | Age: 50
End: 2020-02-02
Payer: MEDICARE

## 2020-02-02 LAB
ALBUMIN SERPL-MCNC: 2 G/DL (ref 3.4–5)
ALBUMIN/GLOB SERPL: 0.4 {RATIO} (ref 0.8–1.7)
ALP SERPL-CCNC: 145 U/L (ref 45–117)
ALT SERPL-CCNC: 18 U/L (ref 16–61)
AST SERPL-CCNC: 25 U/L (ref 10–38)
BACTERIA SPEC CULT: ABNORMAL
BILIRUB DIRECT SERPL-MCNC: 0.6 MG/DL (ref 0–0.2)
BILIRUB SERPL-MCNC: 0.8 MG/DL (ref 0.2–1)
GLOBULIN SER CALC-MCNC: 4.5 G/DL (ref 2–4)
GLUCOSE BLD STRIP.AUTO-MCNC: 132 MG/DL (ref 70–110)
GLUCOSE BLD STRIP.AUTO-MCNC: 162 MG/DL (ref 70–110)
GLUCOSE BLD STRIP.AUTO-MCNC: 56 MG/DL (ref 70–110)
GLUCOSE BLD STRIP.AUTO-MCNC: 67 MG/DL (ref 70–110)
GLUCOSE BLD STRIP.AUTO-MCNC: 95 MG/DL (ref 70–110)
GLUCOSE BLD STRIP.AUTO-MCNC: 96 MG/DL (ref 70–110)
GRAM STN SPEC: ABNORMAL
MAGNESIUM SERPL-MCNC: 1.8 MG/DL (ref 1.6–2.6)
PROT SERPL-MCNC: 6.5 G/DL (ref 6.4–8.2)
SERVICE CMNT-IMP: ABNORMAL
SERVICE CMNT-IMP: ABNORMAL

## 2020-02-02 PROCEDURE — 65270000029 HC RM PRIVATE

## 2020-02-02 PROCEDURE — C9113 INJ PANTOPRAZOLE SODIUM, VIA: HCPCS | Performed by: HOSPITALIST

## 2020-02-02 PROCEDURE — 74011250637 HC RX REV CODE- 250/637: Performed by: PODIATRIST

## 2020-02-02 PROCEDURE — 82962 GLUCOSE BLOOD TEST: CPT

## 2020-02-02 PROCEDURE — 83735 ASSAY OF MAGNESIUM: CPT

## 2020-02-02 PROCEDURE — 80076 HEPATIC FUNCTION PANEL: CPT

## 2020-02-02 PROCEDURE — 36415 COLL VENOUS BLD VENIPUNCTURE: CPT

## 2020-02-02 PROCEDURE — 74011000258 HC RX REV CODE- 258: Performed by: HOSPITALIST

## 2020-02-02 PROCEDURE — 74011250636 HC RX REV CODE- 250/636: Performed by: HOSPITALIST

## 2020-02-02 PROCEDURE — 74011000258 HC RX REV CODE- 258: Performed by: FAMILY MEDICINE

## 2020-02-02 PROCEDURE — 74011000250 HC RX REV CODE- 250: Performed by: HOSPITALIST

## 2020-02-02 PROCEDURE — 74011250637 HC RX REV CODE- 250/637: Performed by: FAMILY MEDICINE

## 2020-02-02 RX ADMIN — HEPARIN SODIUM 5000 UNITS: 5000 INJECTION INTRAVENOUS; SUBCUTANEOUS at 10:59

## 2020-02-02 RX ADMIN — HEPARIN SODIUM 5000 UNITS: 5000 INJECTION INTRAVENOUS; SUBCUTANEOUS at 23:11

## 2020-02-02 RX ADMIN — HEPARIN SODIUM 5000 UNITS: 5000 INJECTION INTRAVENOUS; SUBCUTANEOUS at 00:31

## 2020-02-02 RX ADMIN — Medication 10 ML: at 16:30

## 2020-02-02 RX ADMIN — PIPERACILLIN AND TAZOBACTAM 2.25 G: 2; .25 INJECTION, POWDER, LYOPHILIZED, FOR SOLUTION INTRAVENOUS at 00:31

## 2020-02-02 RX ADMIN — ACETAMINOPHEN 650 MG: 325 TABLET, FILM COATED ORAL at 06:24

## 2020-02-02 RX ADMIN — ACETAMINOPHEN 650 MG: 325 TABLET, FILM COATED ORAL at 23:13

## 2020-02-02 RX ADMIN — SODIUM CHLORIDE 40 MG: 9 INJECTION, SOLUTION INTRAMUSCULAR; INTRAVENOUS; SUBCUTANEOUS at 10:59

## 2020-02-02 RX ADMIN — HEPARIN SODIUM 5000 UNITS: 5000 INJECTION INTRAVENOUS; SUBCUTANEOUS at 16:28

## 2020-02-02 RX ADMIN — ACETAMINOPHEN 650 MG: 325 TABLET, FILM COATED ORAL at 12:25

## 2020-02-02 RX ADMIN — ACETAMINOPHEN 650 MG: 325 TABLET, FILM COATED ORAL at 00:31

## 2020-02-02 RX ADMIN — PIPERACILLIN AND TAZOBACTAM 2.25 G: 2; .25 INJECTION, POWDER, LYOPHILIZED, FOR SOLUTION INTRAVENOUS at 10:59

## 2020-02-02 RX ADMIN — DEXTROSE MONOHYDRATE 250 ML: 10 INJECTION, SOLUTION INTRAVENOUS at 07:55

## 2020-02-02 NOTE — PROGRESS NOTES
Hospitalist Progress Note    Patient: Ksenia Gomez MRN: [id number]  CSN: [id number]    YOB: 1970  Age: 54 y.o. Sex: male    DOA: 1/29/2020 LOS:  LOS: 4 days            Assessment/Plan   1. Sepsis-  leukocytosis persisting with out fever. No signficnat diarrhea noted. ID follwoing  2. Cellulitis & abscess of foot w acute osteomyelitis sp I&D. Cultures w MSSA & citrobacter   3. ESRD on HD  4. Dm w hypoglycemia  5. Hypotension- improving  6. R BKA       Plan:  - DC all correction insulin  - hyopglycemia protocol  - push po  - abx per ID-- holding antihypertensives      Patient Active Problem List   Diagnosis Code    Hyponatremia E87.1    Anemia of chronic disease D63.8    Acute on chronic renal insufficiency N28.9, N18.9    Acute exacerbation of CHF (congestive heart failure) ([facility]) I50.9    Iron deficiency anemia D50.9    Other restrictive cardiomyopathy ([facility] I42.5    Dehydration E86.0    ESRD on hemodialysis ([facility] N18.6, Z99.2    Cellulitis and abscess of foot L03.119, L02.619    HTN (hypertension) I10    Acute osteomyelitis ([facility] M86.10    Hx of BKA, right ([facility] Z89.511    Esophagitis K20.9    Hypoglycemia E16.2               Subjective:    cc: \" I feel really tired\"  Pt w hypoglycemia overnight  Reports feeling \" really tired\", spending much of day sleeping  Poor po intake  Remains afebrile  - foot pain controlled  - had loose bowels but no diarrhea noted overnight    REVIEW OF SYSTEMS:  General: No fevers or chills. Cardiovascular: No chest pain or pressure. No palpitations. Pulmonary: No shortness of breath. Gastrointestinal: No nausea, vomiting.      Objective:        Vital signs/Intake and Output:  Visit Vitals  /50 (BP 1 Location: Right arm, BP Patient Position: At rest)   Pulse (!) 102   Temp 97.9 °F (36.6 °C)   Resp 17   Ht 5' 7\" (1.702 m)   Wt 89.9 kg (198 lb 3.2 oz)   SpO2 99%   BMI 31.04 kg/m²     Current Shift:  No intake/output data recorded. Last three shifts:  01/31 1901 - 02/02 0700  In: 710 [P.O.:360; I.V.:350]  Out: -     Body mass index is 31.04 kg/m².     Physical Exam:  GEN: asleep, easily aruosable, Alert and oriented times three  EYES: conjunctiva normal, lids with out lesions  HEENT: MMM, No thyromegaly, no lymphadenopathy  HEART: RRR +S1 +S2, no JVD, pulses 2+ distally  LUNGS: CTA B/L no wheezes, rales or rhonchi  ABDOMEN: + BS, soft NT/ND no organomegaly,  No rebound  EXTREMITIES: no edema cyanosis, foot wrapped cdi, cap refill normal   SKIN: no rashes or skin breakdown, no nodules, normal turgor  Current Facility-Administered Medications   Medication Dose Route Frequency    [START ON 2/3/2020] DAPTOmycin (CUBICIN) 500 mg in water, bacteriostatic (WATER) 10 mL IV syringe RF formulation  500 mg IntraVENous Once per day on Mon Wed Fri    albumin human 25% (BUMINATE) solution 25 g  25 g IntraVENous DIALYSIS PRN    glucose chewable tablet 16 g  4 Tab Oral PRN    glucagon (GLUCAGEN) injection 1 mg  1 mg IntraMUSCular PRN    dextrose 10% infusion 125-250 mL  125-250 mL IntraVENous PRN    promethazine (PHENERGAN) 25 mg in 0.9% sodium chloride 50 mL IVPB  25 mg IntraVENous Q4H PRN    morphine 10 mg/ml injection 4 mg  4 mg IntraVENous Q3H PRN    sodium chloride 0.45 % in dextrose 10% 1,019.6 mL infusion   IntraVENous CONTINUOUS    sodium chloride (NS) flush 5-40 mL  5-40 mL IntraVENous Q8H    sodium chloride (NS) flush 5-40 mL  5-40 mL IntraVENous PRN    acetaminophen (TYLENOL) tablet 650 mg  650 mg Oral Q6H    HYDROmorphone (DILAUDID) tablet 2 mg  2 mg Oral Q4H PRN    HYDROmorphone (PF) (DILAUDID) injection 1 mg  1 mg IntraVENous Q4H PRN    naloxone (NARCAN) injection 0.4 mg  0.4 mg IntraVENous PRN    influenza vaccine 2019-20 (6 mos+)(PF) (FLUARIX/FLULAVAL/FLUZONE QUAD) injection 0.5 mL  0.5 mL IntraMUSCular PRIOR TO DISCHARGE    acetaminophen (TYLENOL) tablet 650 mg  650 mg Oral Q4H PRN    naloxone (NARCAN) injection 0.4 mg  0.4 mg IntraVENous PRN    diphenhydrAMINE (BENADRYL) injection 12.5 mg  12.5 mg IntraVENous Q4H PRN    ondansetron (ZOFRAN) injection 4 mg  4 mg IntraVENous Q4H PRN    heparin (porcine) injection 5,000 Units  5,000 Units SubCUTAneous Q8H    hydrALAZINE (APRESOLINE) 20 mg/mL injection 10 mg  10 mg IntraVENous Q6H PRN    piperacillin-tazobactam (ZOSYN) 2.25 g in 0.9% sodium chloride (MBP/ADV) 50 mL MBP  2.25 g IntraVENous Q8H    [Held by provider] carvediloL (COREG) tablet 25 mg  25 mg Oral BID WITH MEALS    [Held by provider] isosorbide mononitrate ER (IMDUR) tablet 60 mg  60 mg Oral DAILY    [Held by provider] hydrALAZINE (APRESOLINE) tablet 25 mg  25 mg Oral TID    pantoprazole (PROTONIX) 40 mg in 0.9% sodium chloride 10 mL injection  40 mg IntraVENous DAILY         All the patient's labs over the past 24 hours were reviewed both during my initial daily workflow process and at the time notated as \"note time\" in [facility]. (It is not time stamped separately in this workflow.)  Select labs are listed below.         Labs: Results:       Chemistry Recent Labs     02/01/20  0649 01/31/20 0220   * 99    139   K 3.7 4.0    102   CO2 28 29   BUN 19* 44*   CREA 5.46* 9.19*   CA 7.9* 7.7*   AGAP 7 8   BUCR 3* 5*      CBC w/Diff Recent Labs     02/01/20  0649 01/31/20 0220   WBC 26.0* 26.2*   RBC 3.37* 3.89*   HGB 8.2* 9.4*   HCT 26.3* 30.5*    205   GRANS 95* 73   LYMPH 3* 1*   EOS 1 1                              Procedures/imaging: see electronic medical records for all procedures/Xrays and details which were not copied into this note but were reviewed prior to creation of Turner Calhoun DO  Internal Medicine/Geriatrics

## 2020-02-02 NOTE — PROGRESS NOTES
[facility]  [address] [facility] [address]  [place], [address] [place] [address]  [phone](Q)  [phone](W)  [phone](A)     A/P  1. Dressing change performed by me(1/2\" iodoform gauze, wet to dry dilute betadine solution packing) with abd's, 4x4's and kerlix. 2. Continue non wt bearing. 3. Continue antibiotics per ID  4. Sepsis:leukocytosis without fever  5. Osteomyelitis left foot calcaneous  6. Hypotension improving  7. Right Below knee amputation  8. Upon resolved infection will set patient up for secondary closure/graft of posterior heel/ankle ulcer  9. If above heals will set patient up for custom AFO. Patient Active Problem List   Diagnosis Code    Hyponatremia E87.1    Anemia of chronic disease D63.8    Acute on chronic renal insufficiency N28.9, N18.9    Acute exacerbation of CHF (congestive heart failure) ([facility]) I50.9    Iron deficiency anemia D50.9    Other restrictive cardiomyopathy ([address] I42.5    Dehydration E86.0    ESRD on hemodialysis ([address] N18.6, Z99.2    Cellulitis and abscess of foot L03.119, L02.619    HTN (hypertension) I10    Acute osteomyelitis ([address] M86.10    Hx of BKA, right ([address] Z89.511    Esophagitis K20.9    Hypoglycemia E16.2      S/  Patient feels a little better than yesterday with a little more energy, not much pain and less nausea. O/  Alert & oriented x3 and more conversation and movement(improved)    Status post partial resection of left foot calcaneous, aggressive I&D, bone biopsy  Left foot wound is without drainage or minor bleeding, plantar wound is co-apted with sutures, posterior heel wound with about 12-18\"s of  1/2\" iodoform packing with exposed achilles tendon. Resolving cellulitis, reduced to faint calor, neg:dolor, foul odor, purulent drainage. Results for Hannah Biggs (MRN [id number]) as of 2/2/2020 11:50   Ref.  Range 2/1/2020 06:49   WBC Latest Ref Range: 4.6 - 13.2 K/uL 26.0 (H)   RBC Latest Ref Range: 4.70 - 5.50 M/uL 3.37 (L)   HGB Latest Ref Range: 13.0 - 16.0 g/dL 8.2 (L)   HCT Latest Ref Range: 36.0 - 48.0 % 26.3 (L)   MCV Latest Ref Range: 74.0 - 97.0 FL 78.0   MCH Latest Ref Range: 24.0 - 34.0 PG 24.3   MCHC Latest Ref Range: 31.0 - 37.0 g/dL 31.2   RDW Latest Ref Range: 11.6 - 14.5 % 19.6 (H)   PLATELET Latest Ref Range: 135 - 420 K/uL 188   MPV Latest Ref Range: 9.2 - 11.8 FL 9.0 (L)   NEUTROPHILS Latest Ref Range: 42 - 75 % 95 (H)   LYMPHOCYTES Latest Ref Range: 20 - 51 % 3 (L)   MONOCYTES Latest Ref Range: 2 - 9 % 1 (L)   EOSINOPHILS Latest Ref Range: 0 - 5 % 1   BASOPHILS Latest Ref Range: 0 - 3 % 0   DF Latest Units:   MANUAL   ABS. NEUTROPHILS Latest Ref Range: 1.8 - 8.0 K/UL 24.6 (H)   ABS.  LYMPHOCYTES Latest Ref Range: 0.8 - 3.5 K/UL 0.8     GRAM STAIN FEW WBC'S      Final   GRAM STAIN NO ORGANISMS SEEN      Final   Culture result: Abnormal       Final   FEW STAPHYLOCOCCUS AUREUS    Culture result: Abnormal       Final   FEW CITROBACTER KOSERI    Susceptibility     Staphylococcus aureus     Antibiotic Interpretation Value Method Comment   Ampicillin/sulbactam ($) Susceptible DEDUCED SENSITIVE ug/mL MAURO    Penicillin G ($$) Resistant >=0.5 ug/mL MAURO    Cefazolin ($) Susceptible DEDUCED SENSITIVE ug/mL MAURO    Clindamycin ($) Resistant >=8 ug/mL MAURO    Erythromycin ($$$$) Resistant >=8 ug/mL MAURO    Gentamicin ($) Susceptible <=0.5 ug/mL MAURO    Levofloxacin ($) Susceptible 0.25 ug/mL MAURO    Oxacillin Susceptible <=0.25 ug/mL MAURO    Rifampin ($$$$) Susceptible <=0.5 ug/mL MAURO    Tetracycline Susceptible <=1 ug/mL MAURO    Vancomycin ($) Susceptible <=0.5 ug/mL MAURO    Trimeth-Sulfamethoxa Susceptible <=10 ug/mL MAURO    Tigecycline ($$$$) Susceptible <=0.12 ug/mL MAURO    Citrobacter koseri     Antibiotic Interpretation Value Method Comment   Cefazolin ($) Susceptible <=4 ug/mL MAURO    Cefepime ($$) Susceptible <=1 ug/mL MAURO    Ceftazidime ($) Susceptible <=1 ug/mL MAURO    Ceftriaxone ($) Susceptible <=1 ug/mL MAURO    Ciprofloxacin ($) Susceptible <=0.25 ug/mL MAURO    Gentamicin ($) Susceptible <=1 ug/mL MAURO    Imipenem Susceptible <=0.25 ug/mL MAURO    Levofloxacin ($) Susceptible <=0.12 ug/mL MAURO    Piperacillin/Tazobac ($) Susceptible <=4 ug/mL MAURO    Tobramycin ($) Susceptible <=1 ug/mL MAURO    Trimeth-Sulfamethoxa Susceptible <=20 ug/mL MAURO     Condensed View   Susceptibility     Staphylococcus aureus (1)     Antibiotic Interpretation Method Status    Ampicillin/sulbactam ($) Susceptible MAURO Final    Penicillin G ($$) Resistant MAURO Final    Cefazolin ($) Susceptible MAURO Final    Clindamycin ($) Resistant MAURO Final    Erythromycin ($$$$) Resistant MAURO Final    Gentamicin ($) Susceptible MAURO Final    Levofloxacin ($) Susceptible MAURO Final    Oxacillin Susceptible MAURO Final    Rifampin ($$$$) Susceptible MAURO Final    Tetracycline Susceptible MAURO Final    Vancomycin ($) Susceptible MAURO Final    Trimeth-Sulfamethoxa Susceptible MAURO Final    Tigecycline ($$$$) Susceptible MAURO Final    Citrobacter koseri (2)     Antibiotic Interpretation Method Status    Cefazolin ($) Susceptible MAURO Final    Cefepime ($$) Susceptible MAURO Final    Ceftazidime ($) Susceptible MAURO Final    Ceftriaxone ($) Susceptible MAURO Final    Ciprofloxacin ($) Susceptible MAURO Final    Gentamicin ($) Susceptible MAURO Final    Imipenem Susceptible MAURO Final    Levofloxacin ($) Susceptible MAURO Final    Piperacillin/Tazobac ($) Susceptible MAURO Final    Tobramycin ($) Susceptible MAURO Final    Trimeth-Sulfamethoxa Susceptible MAURO Final     Condensed View   Lab and Collection     CULTURE, TISSUE [facility] (Order: [id number]) - 1/30/2020

## 2020-02-02 NOTE — PROGRESS NOTES
DC Plan: Discharge home with MD follow up, family assistance, [facility], needs wheelchair ordered for home. Will need medical medicaid transport home at discharge. Chart reviewed as CM on call. Pt admitted to medical by hospitalist. Met with pt at bedside to discuss dc plan. Lengthy conversation had regarding discharge plan. PT recommending rehab. FOC offered for rehab. Pt is adamantly refusing citing poor experiences with rehabs/SNFs in past. Did discuss acute rehab as possible option and pt still refusing. FOC offered for [facility]. Pt chose [facility], referral placed. Pt has wheelchair that he stated he got at [facility] that is missing foot rests, will need new wheelchair. Pt has RW, quad cane, shower chair, raised toilet seat. Pt lives along but has brother nearby. Pt will need medical transport home. Home address confirmed per face sheet. Questions addressed. CM will cont to follow for transition of care needs and will be available via hospital  on weekends. Care Management Interventions  PCP Verified by CM: Yes(ASKING FOR NEW PCP)  Mode of Transport at Discharge:  Other (see comment)(medicaid transport)  Transition of Care Consult (CM Consult): Home Health, Discharge [address]: Yes  Health Maintenance Reviewed: Yes  Physical Therapy Consult: Yes  Current Support Network: Lives Alone, Family Lives Nearby  Confirm Follow Up Transport: Other (see comment)(medicaid)  The Plan for Transition of Care is Related to the Following Treatment Goals : HOME WITH MD FOLLOW UP, [facility]  The Patient and/or Patient Representative was Provided with a Choice of Provider and Agrees with the Discharge Plan?: Yes  Name of the Patient Representative Who was Provided with a Choice of Provider and Agrees with the Discharge Plan: PATIENT  Freedom of Choice List was Provided with Basic Dialogue that Supports the Patient's Individualized Plan of Care/Goals, Treatment Preferences and Shares the Quality Data Associated with the Providers?: Yes  Discharge Location  Discharge Placement: Home with home health

## 2020-02-02 NOTE — CONSULTS
Brief Note from [place].     OR MICRO still developing  Surface MICRO shows MSSA (and wimpy GNR)    IF OR MICRO matches up the MSSA, I'll stop the daptomycin as pip/tzb (or CAX/Cefazolin) would kill both the MSSA + Marixa Salmon MD  Cell [phone]  [facility]

## 2020-02-02 NOTE — PROGRESS NOTES
1945 Assumed patient care from Carmen Delgado RN.   2100 Shift assessment completed and documented in flow sheets at this time. 2105 Incontinence care provided. Brief changed. 4048 Alerted by day shift CNA of patient's blood sugar of 56. Nate Bertrand relayed that she gave him orange juice. Will recheck in 15 minutes. 6450 Patient drank orange juice. 2302 Blood sugar 67.  0755 PRN Dextrose administered. 0805 IV Dextrose 250 mL bolus running. Day shift nurse will monitor. 3721 Bedside and verbal shift change report given to Carmen Delgado RN (oncoming nurse) by Tamra Devi RN (offgoing nurse). Report included the following information: SBAR, Kardex, MAR, and recent results.

## 2020-02-02 NOTE — PROGRESS NOTES
Pt unable to participate with PT due to:  [x]  1st attempt: nursing in with patient  11am  [x]  2nd attempt: EMT in attempting IV access. 12:12  [x]  3rd attempt: Pt on bed pan and requesting PT to return later. 1:39pm  []  Pt lethargic  []  Off Unit    Will f/u tomorrow. Thank you.   Richard Ozuna, PTA

## 2020-02-02 NOTE — PROGRESS NOTES
Assessment:   ESRD  DM  HTN  Anemia of CKD  Foot ulcer     Plan:    HD  MWF  Left foot ulcer per primary team  Ensure clear           CC: ESRD, has pain,   Interval History: No change since yesterday     Subjective:   No change since i saw pt last. No new symptoms or issues.         Review of Systems:  Negative for Edema, SOB           Blood pressure 145/74, pulse (!) 103, temperature 97.9 °F (36.6 °C), resp. rate 16, height 5' 7\" (1.702 m), weight 89.9 kg (198 lb 3.2 oz), SpO2 100 %.       awake and alert   NAD      Intake/Output Summary (Last 24 hours) at 2/2/2020 1140  Last data filed at 2/2/2020 1109  Gross per 24 hour   Intake 0 ml   Output 100 ml   Net -100 ml      Recent Labs     02/01/20  0649   WBC 26.0*     Lab Results   Component Value Date/Time    Sodium 138 02/01/2020 06:49 AM    Potassium 3.7 02/01/2020 06:49 AM    Chloride 103 02/01/2020 06:49 AM    CO2 28 02/01/2020 06:49 AM    Anion gap 7 02/01/2020 06:49 AM    Glucose 168 (H) 02/01/2020 06:49 AM    BUN 19 (H) 02/01/2020 06:49 AM    Creatinine 5.46 (H) 02/01/2020 06:49 AM    BUN/Creatinine ratio 3 (L) 02/01/2020 06:49 AM    GFR est AA 14 (L) 02/01/2020 06:49 AM    GFR est non-AA 11 (L) 02/01/2020 06:49 AM    Calcium 7.9 (L) 02/01/2020 06:49 AM        Current Facility-Administered Medications   Medication Dose Route Frequency Provider Last Rate Last Dose    [START ON 2/3/2020] DAPTOmycin (CUBICIN) 500 mg in water, bacteriostatic (WATER) 10 mL IV syringe RF formulation  500 mg IntraVENous Once per day on Mon Wed Fri Jose Alberto Gtz MD        albumin human 25% (BUMINATE) solution 25 g  25 g IntraVENous DIALYSIS PRN Yanet Landry DO   25 g at 01/31/20 1735    glucose chewable tablet 16 g  4 Tab Oral PRN Randy Arambula MD        glucagon (GLUCAGEN) injection 1 mg  1 mg IntraMUSCular PRN TeEdvin Casas MD        dextrose 10% infusion 125-250 mL  125-250 mL IntraVENous PRN Randy Arambula  mL/hr at 02/02/20 0755 250 mL at 02/02/20 0755    promethazine (PHENERGAN) 25 mg in 0.9% sodium chloride 50 mL IVPB  25 mg IntraVENous Q4H PRN Maria Arambula MD        morphine 10 mg/ml injection 4 mg  4 mg IntraVENous Q3H PRN Maria Arambula MD   4 mg at 01/30/20 1916    sodium chloride 0.45 % in dextrose 10% 1,019.6 mL infusion   IntraVENous CONTINUOUS Padmini Deiters, DO 75 mL/hr at 01/31/20 0737      sodium chloride (NS) flush 5-40 mL  5-40 mL IntraVENous Q8H MawusiSam, DPM   10 mL at 02/01/20 1657    sodium chloride (NS) flush 5-40 mL  5-40 mL IntraVENous PRN Mawusi, Jent, DPM        acetaminophen (TYLENOL) tablet 650 mg  650 mg Oral Q6H Mawusi, Jent, DPM   650 mg at 02/02/20 0624    HYDROmorphone (DILAUDID) tablet 2 mg  2 mg Oral Q4H PRN Jen Carpentert, DPM        HYDROmorphone (PF) (DILAUDID) injection 1 mg  1 mg IntraVENous Q4H PRN Sam Carpenter, DPNIVIA        naloxone (NARCAN) injection 0.4 mg  0.4 mg IntraVENous PRN Yosvany Carpenter, DPM        influenza vaccine 2019-20 (6 mos+)(PF) (FLUARIX/FLULAVAL/FLUZONE QUAD) injection 0.5 mL  0.5 mL IntraMUSCular PRIOR TO DISCHARGE Farntz Shelton MD        acetaminophen (TYLENOL) tablet 650 mg  650 mg Oral Q4H PRN Frantz Shelton MD   650 mg at 01/29/20 2017    naloxone [facility]) injection 0.4 mg  0.4 mg IntraVENous PRN Frantz Shelton MD        diphenhydrAMINE (BENADRYL) injection 12.5 mg  12.5 mg IntraVENous Q4H PRN Frantz Shelton MD        ondansetron [facility]) injection 4 mg  4 mg IntraVENous Q4H PRN Frantz Shelton MD   4 mg at 01/30/20 0150    heparin (porcine) injection 5,000 Units  5,000 Units SubCUTAneous Q8H Frantz Shelton MD   5,000 Units at 02/02/20 1059    hydrALAZINE (APRESOLINE) 20 mg/mL injection 10 mg  10 mg IntraVENous Q6H PRN Frantz Shelton MD        piperacillin-tazobactam (ZOSYN) 2.25 g in 0.9% sodium chloride (MBP/ADV) 50 mL MBP  2.25 g IntraVENous Q8H Frantz Shelton  mL/hr at 02/02/20 1059 2.25 g at 02/02/20 1059    [Held by provider] carvediloL (COREG) tablet 25 mg  25 mg Oral BID WITH MEALS Rocky Ruby MD   Stopped at 01/30/20 1006    [Held by provider] isosorbide mononitrate ER (IMDUR) tablet 60 mg  60 mg Oral DAILY Rocky Ruby MD       [facility] by provider] hydrALAZINE (APRESOLINE) tablet 25 mg  25 mg Oral TID Rocky Ruby MD   Stopped at 01/29/20 2200    pantoprazole (PROTONIX) 40 mg in 0.9% sodium chloride 10 mL injection  40 mg IntraVENous DAILY Rocky Ruby MD   40 mg at 02/02/20 1059

## 2020-02-02 NOTE — PROGRESS NOTES
0730  Bedside and Verbal shift change report given to [address] [place] [address] (oncoming nurse) by Aurelia Raymond RN (offgoing nurse). Report included the following information SBAR, Kardex, Intake/Output, MAR and Recent Results. Dextrose bolus administered. Pt has eaten breakfast.  Blood sugar is 98 at 1100    Pt IV infiltrated. Medic attempt to reinsert IV port was unsuccessful. Anesthesiologist was called upon and that attempt was unsuccessful. Pt available arm was deemed to be too unfit and too difficult for vein availability. Paged Dr. Crispin Rose for further instructions. [place] 9928 with Dr. Crispin Rose concerning pt Shanad Nares. IV line infiltrated with Antibiotics due. Previous doses administered. Instructed to hold pending doses for the remainder of the day and to wait for next administration with scheduled dialysis. Doctor understood that there was no IV access. Dr. Crispin Rose will provide further instructions for long term at later time. 2024  Bedside and Verbal shift change report given to Deniz Toussaint RN (oncoming nurse) by Alida Simpson RN (offgoing nurse). Report included the following information SBAR, Kardex, Intake/Output, MAR and Recent Results.

## 2020-02-03 LAB
AMMONIA PLAS-SCNC: <10 UMOL/L (ref 11–32)
ANION GAP SERPL CALC-SCNC: 7 MMOL/L (ref 3–18)
BACTERIA SPEC ANAEROBE CULT: NORMAL
BACTERIA SPEC ANAEROBE CULT: NORMAL
BACTERIA SPEC CULT: ABNORMAL
BACTERIA SPEC CULT: ABNORMAL
BASOPHILS # BLD: 0 K/UL (ref 0–0.1)
BASOPHILS NFR BLD: 0 % (ref 0–2)
BUN SERPL-MCNC: 30 MG/DL (ref 7–18)
BUN/CREAT SERPL: 4 (ref 12–20)
CALCIUM SERPL-MCNC: 7.9 MG/DL (ref 8.5–10.1)
CHLORIDE SERPL-SCNC: 104 MMOL/L (ref 100–111)
CO2 SERPL-SCNC: 26 MMOL/L (ref 21–32)
CREAT SERPL-MCNC: 7.64 MG/DL (ref 0.6–1.3)
DIFFERENTIAL METHOD BLD: ABNORMAL
EOSINOPHIL # BLD: 0.9 K/UL (ref 0–0.4)
EOSINOPHIL NFR BLD: 4 % (ref 0–5)
ERYTHROCYTE [DISTWIDTH] IN BLOOD BY AUTOMATED COUNT: 19.3 % (ref 11.6–14.5)
EST. AVERAGE GLUCOSE BLD GHB EST-MCNC: NORMAL MG/DL
GLUCOSE BLD STRIP.AUTO-MCNC: 149 MG/DL (ref 70–110)
GLUCOSE BLD STRIP.AUTO-MCNC: 174 MG/DL (ref 70–110)
GLUCOSE BLD STRIP.AUTO-MCNC: 79 MG/DL (ref 70–110)
GLUCOSE BLD STRIP.AUTO-MCNC: 84 MG/DL (ref 70–110)
GLUCOSE BLD STRIP.AUTO-MCNC: 95 MG/DL (ref 70–110)
GLUCOSE SERPL-MCNC: 41 MG/DL (ref 74–99)
GRAM STN SPEC: ABNORMAL
GRAM STN SPEC: ABNORMAL
HBA1C MFR BLD: 4.6 % (ref 4.2–5.6)
HCT VFR BLD AUTO: 25.1 % (ref 36–48)
HGB BLD-MCNC: 8 G/DL (ref 13–16)
LYMPHOCYTES # BLD: 0.9 K/UL (ref 0.9–3.6)
LYMPHOCYTES NFR BLD: 4 % (ref 21–52)
MAGNESIUM SERPL-MCNC: 1.8 MG/DL (ref 1.6–2.6)
MCH RBC QN AUTO: 24.3 PG (ref 24–34)
MCHC RBC AUTO-ENTMCNC: 31.9 G/DL (ref 31–37)
MCV RBC AUTO: 76.3 FL (ref 74–97)
MONOCYTES # BLD: 0.7 K/UL (ref 0.05–1.2)
MONOCYTES NFR BLD: 3 % (ref 3–10)
NEUTS SEG # BLD: 19.6 K/UL (ref 1.8–8)
NEUTS SEG NFR BLD: 89 % (ref 40–73)
PHOSPHATE SERPL-MCNC: 2.6 MG/DL (ref 2.5–4.9)
PLATELET # BLD AUTO: 207 K/UL (ref 135–420)
PMV BLD AUTO: 9.3 FL (ref 9.2–11.8)
POTASSIUM SERPL-SCNC: 4 MMOL/L (ref 3.5–5.5)
RBC # BLD AUTO: 3.29 M/UL (ref 4.7–5.5)
SERVICE CMNT-IMP: ABNORMAL
SODIUM SERPL-SCNC: 137 MMOL/L (ref 136–145)
SPECIMEN SOURCE: NORMAL
SPECIMEN SOURCE: NORMAL
WBC # BLD AUTO: 22 K/UL (ref 4.6–13.2)

## 2020-02-03 PROCEDURE — 74011250636 HC RX REV CODE- 250/636: Performed by: HOSPITALIST

## 2020-02-03 PROCEDURE — 82962 GLUCOSE BLOOD TEST: CPT

## 2020-02-03 PROCEDURE — 80048 BASIC METABOLIC PNL TOTAL CA: CPT

## 2020-02-03 PROCEDURE — 74011250637 HC RX REV CODE- 250/637: Performed by: HOSPITALIST

## 2020-02-03 PROCEDURE — 74011250636 HC RX REV CODE- 250/636: Performed by: INTERNAL MEDICINE

## 2020-02-03 PROCEDURE — 84100 ASSAY OF PHOSPHORUS: CPT

## 2020-02-03 PROCEDURE — 65270000029 HC RM PRIVATE

## 2020-02-03 PROCEDURE — 83036 HEMOGLOBIN GLYCOSYLATED A1C: CPT

## 2020-02-03 PROCEDURE — 36415 COLL VENOUS BLD VENIPUNCTURE: CPT

## 2020-02-03 PROCEDURE — 74011250637 HC RX REV CODE- 250/637: Performed by: PODIATRIST

## 2020-02-03 PROCEDURE — 74011000250 HC RX REV CODE- 250: Performed by: HOSPITALIST

## 2020-02-03 PROCEDURE — 85025 COMPLETE CBC W/AUTO DIFF WBC: CPT

## 2020-02-03 PROCEDURE — 83735 ASSAY OF MAGNESIUM: CPT

## 2020-02-03 PROCEDURE — 82140 ASSAY OF AMMONIA: CPT

## 2020-02-03 RX ORDER — DEXTROSE MONOHYDRATE 50 MG/ML
25 INJECTION, SOLUTION INTRAVENOUS CONTINUOUS
Status: DISCONTINUED | OUTPATIENT
Start: 2020-02-03 | End: 2020-02-05 | Stop reason: HOSPADM

## 2020-02-03 RX ORDER — MAGNESIUM SULFATE HEPTAHYDRATE 40 MG/ML
2 INJECTION, SOLUTION INTRAVENOUS ONCE
Status: ACTIVE | OUTPATIENT
Start: 2020-02-03 | End: 2020-02-03

## 2020-02-03 RX ORDER — CEFAZOLIN SODIUM 2 G/50ML
2 SOLUTION INTRAVENOUS
Status: DISCONTINUED | OUTPATIENT
Start: 2020-02-05 | End: 2020-02-05 | Stop reason: HOSPADM

## 2020-02-03 RX ORDER — CEFAZOLIN SODIUM 2 G/50ML
2 SOLUTION INTRAVENOUS
Status: DISCONTINUED | OUTPATIENT
Start: 2020-02-03 | End: 2020-02-03

## 2020-02-03 RX ORDER — CEFAZOLIN SODIUM 2 G/50ML
2 SOLUTION INTRAVENOUS
Status: DISCONTINUED | OUTPATIENT
Start: 2020-02-03 | End: 2020-02-05 | Stop reason: HOSPADM

## 2020-02-03 RX ADMIN — CARVEDILOL 25 MG: 12.5 TABLET, FILM COATED ORAL at 18:07

## 2020-02-03 RX ADMIN — Medication 10 ML: at 21:47

## 2020-02-03 RX ADMIN — LIDOCAINE HYDROCHLORIDE 1 G: 10 INJECTION, SOLUTION INFILTRATION; PERINEURAL at 18:07

## 2020-02-03 RX ADMIN — ACETAMINOPHEN 650 MG: 325 TABLET, FILM COATED ORAL at 18:07

## 2020-02-03 RX ADMIN — ISOSORBIDE MONONITRATE 60 MG: 60 TABLET, EXTENDED RELEASE ORAL at 09:00

## 2020-02-03 RX ADMIN — EPOETIN ALFA-EPBX 9000 UNITS: 3000 INJECTION, SOLUTION INTRAVENOUS; SUBCUTANEOUS at 21:46

## 2020-02-03 RX ADMIN — ACETAMINOPHEN 650 MG: 325 TABLET, FILM COATED ORAL at 12:00

## 2020-02-03 RX ADMIN — HEPARIN SODIUM 5000 UNITS: 5000 INJECTION INTRAVENOUS; SUBCUTANEOUS at 08:07

## 2020-02-03 RX ADMIN — ACETAMINOPHEN 650 MG: 325 TABLET, FILM COATED ORAL at 05:56

## 2020-02-03 RX ADMIN — HEPARIN SODIUM 5000 UNITS: 5000 INJECTION INTRAVENOUS; SUBCUTANEOUS at 18:07

## 2020-02-03 NOTE — CONSULTS
[facility]                                               (A Division of [address] [place] [address])                           Follow-up Note      Date of Admission: 1/29/2020     Date of Note:  2/3/2020    Summary:      Mr Heather Sesay is a 54y AAM diabetic ESRD-HD who has been fighting a L heel DFU for weeks. Worsened considerably this week prompting ER visit on 1/29. Also presented with N/V and ABD pain that has improved. Went to OR 1/30 for debridement. Interval History:  CC:  I'm itching  Events of weekend reviewed; pt seen. Since Saturday/late Friday, he has broken out in bodywide pruritic rash much in same fashion as he has previously reacted to vancomycin. Itches all over; really bad on back-side. Foot feels better. Current Antimicrobials: Prior Antimicrobials   1. Pip/tzb IV (1/29-) #5  2. Dapto IV (1/31-) # 1 dose 1. Levo IV (1/29-) #2  2. Clinda IV (1/29-) #2       Assessment Plan:   L calcaneal OM/DFU - complicated    With his OR (and surface wound) cultures all growing MSSA and broadly-susceptible Citrobacter koseri, can consolidate abx to simple cefazolin (or CAX) depending on how his venous access turns. Have stopped the daptomycin (he only got one dose Friday and no pip/tzb yesterday due to lost access) ->POD #4    Consolidating abx to either Cefazolin 2-2-3gm after HD sessions weekly or he'll need a tunneled line dedicated for IV CAX 1gm daily. Rash    Started over the weekend after receiving single dose of daptomycin (no relation to vancomycin). I've stopped both the daptomycin and pip/tzb for now.  ->Cefazolin should be safe   ESRD-HD    DMT2      Microbiology:                1/30 - OR#1 - NOS (+) MSSA and Citrobacter koseri (S to all)                                                      OR#2 - NOS (+) MSSA and Citrobacter koseri (S to all)                                         1/29 - Wd (+) MSSA and CoNS Wd #2 - (+) MSSA and Citrobacter koseri (S to all)        Lines / Catheters:         L AVG - no peripheral (lost)         Patient Active Problem List   Diagnosis Code    Hyponatremia E87.1    Anemia of chronic disease D63.8    Acute on chronic renal insufficiency N28.9, N18.9    Acute exacerbation of CHF (congestive heart failure) (HCC) I50.9    Iron deficiency anemia D50.9    Other restrictive cardiomyopathy ([facility] I42.5    Dehydration E86.0    [facility] N18.6, Z99.2    Cellulitis and abscess of foot L03.119, L02.619    HTN (hypertension) I10    Acute osteomyelitis ([facility] M86.10    Hx of BKA, right ([facility] Z89.511    Esophagitis K20.9    Hypoglycemia E16.2       Current Facility-Administered Medications   Medication Dose Route Frequency    magnesium sulfate 2 g/50 ml IVPB (premix or compounded)  2 g IntraVENous ONCE    dextrose 5% infusion  25 mL/hr IntraVENous CONTINUOUS    [START ON 2/5/2020] epoetin delonte-epbx (RETACRIT) injection 9,000 Units  9,000 Units SubCUTAneous DIALYSIS MON, WED & FRI    ceFAZolin (ANCEF) 2g IVPB in 50 mL D5W  2 g IntraVENous EVERY MON & FRI    [START ON 2/7/2020] ceFAZolin (ANCEF) 3 g/20 mL in sterile water IV syringe  3 g IntraVENous every Friday    albumin human 25% (BUMINATE) solution 25 g  25 g IntraVENous DIALYSIS PRN    glucose chewable tablet 16 g  4 Tab Oral PRN    glucagon (GLUCAGEN) injection 1 mg  1 mg IntraMUSCular PRN    dextrose 10% infusion 125-250 mL  125-250 mL IntraVENous PRN    promethazine (PHENERGAN) 25 mg in 0.9% sodium chloride 50 mL IVPB  25 mg IntraVENous Q4H PRN    morphine 10 mg/ml injection 4 mg  4 mg IntraVENous Q3H PRN    sodium chloride (NS) flush 5-40 mL  5-40 mL IntraVENous Q8H    sodium chloride (NS) flush 5-40 mL  5-40 mL IntraVENous PRN    acetaminophen (TYLENOL) tablet 650 mg  650 mg Oral Q6H    HYDROmorphone (DILAUDID) tablet 2 mg  2 mg Oral Q4H PRN    HYDROmorphone (PF) (DILAUDID) injection 1 mg 1 mg IntraVENous Q4H PRN    naloxone (NARCAN) injection 0.4 mg  0.4 mg IntraVENous PRN    influenza vaccine 2019- (6 mos+)(PF) (FLUARIX/FLULAVAL/FLUZONE QUAD) injection 0.5 mL  0.5 mL IntraMUSCular PRIOR TO DISCHARGE    acetaminophen (TYLENOL) tablet 650 mg  650 mg Oral Q4H PRN    naloxone (NARCAN) injection 0.4 mg  0.4 mg IntraVENous PRN    diphenhydrAMINE (BENADRYL) injection 12.5 mg  12.5 mg IntraVENous Q4H PRN    ondansetron (ZOFRAN) injection 4 mg  4 mg IntraVENous Q4H PRN    heparin (porcine) injection 5,000 Units  5,000 Units SubCUTAneous Q8H    hydrALAZINE (APRESOLINE) 20 mg/mL injection 10 mg  10 mg IntraVENous Q6H PRN    carvediloL (COREG) tablet 25 mg  25 mg Oral BID WITH MEALS    isosorbide mononitrate ER (IMDUR) tablet 60 mg  60 mg Oral DAILY    [Held by provider] hydrALAZINE (APRESOLINE) tablet 25 mg  25 mg Oral TID    pantoprazole (PROTONIX) 40 mg in 0.9% sodium chloride 10 mL injection  40 mg IntraVENous DAILY         Review of Systems - General ROS: negative for - chills, fever or night sweats  Respiratory ROS: no cough, shortness of breath, or wheezing  Cardiovascular ROS: no chest pain or dyspnea on exertion  Gastrointestinal ROS: no abdominal pain, change in bowel habits, or black or bloody stools  Dermatological ROS: negative  positive for - pruritus and rash       Objective:  Visit Vitals  /73 (BP 1 Location: Right arm, BP Patient Position: At rest)   Pulse (!) 107   Temp 98.2 °F (36.8 °C)   Resp 18   Ht 5' 7\" (1.702 m)   Wt 86.6 kg (191 lb)   SpO2 99%   BMI 29.91 kg/m²       Temp (24hrs), Av.3 °F (36.8 °C), Min:98 °F (36.7 °C), Max:98.6 °F (37 °C)      GEN: WDWN AAM in NAD  HEENT: scaly plaques on face not present on Friday last I saw him  CHEST: CTA  CVS:RRR  EXT: L foot wrapped       Lab results:    Chemistry  Recent Labs     20  0439 20  0408 20  0649   GLU 41*  --  168*     --  138   K 4.0  --  3.7     --  103   CO2 26  --  28   BUN 30*  --  19*   CREA 7.64*  --  5.46*   CA 7.9*  --  7.9*   AGAP 7  --  7   BUCR 4*  --  3*   AP  --  145*  --    TP  --  6.5  --    ALB  --  2.0*  --    GLOB  --  4.5*  --    AGRAT  --  0.4*  --        CBC w/ Diff  Recent Labs     02/03/20  0439 02/01/20  0649   WBC 22.0* 26.0*   RBC 3.29* 3.37*   HGB 8.0* 8.2*   HCT 25.1* 26.3*    188   GRANS 89* 95*   LYMPH 4* 3*   EOS 4 1       Microbiology  All Micro Results     Procedure Component Value Units Date/Time    CULTURE, Ami Him STAIN [[id number]]  (Abnormal)  (Susceptibility) Collected:  01/30/20 1430    Order Status:  Completed Specimen:  Wound from Heel Updated:  02/03/20 0742     Special Requests: NO SPECIAL REQUESTS        GRAM STAIN NO ORGANISMS SEEN         NO WBC'S SEEN        Culture result: FEW STAPHYLOCOCCUS AUREUS         RARE CITROBACTER KOSERI       ANAEROBE ID REFLEX [[id number]]  (Abnormal) Collected:  01/30/20 1434    Order Status:  Completed Specimen:  MICROBIAL ISOLATE Updated:  02/02/20 1635     Source PENDING     Result 1 Comment        Comment: (NOTE)  No anaerobes recovered in 48 hours. Result 2 Comment        Comment: (NOTE)  Aerobic bacteria have been isolated. For further identification and  susceptibility testing, please contact the lab within one week. Performed At: [address] [id number]  Indio Pereira MD UB:[id number]         Mendez Herndon REFLEX [[id number]]  (Abnormal) Collected:  01/30/20 1430    Order Status:  Completed Specimen:  MICROBIAL ISOLATE Updated:  02/02/20 1635     Source PENDING     Result 1 Comment        Comment: (NOTE)  No anaerobes recovered in 48 hours. Result 2 Comment        Comment: (NOTE)  Aerobic bacteria have been isolated. For further identification and  susceptibility testing, please contact the lab within one week.   Performed At: [address] [id number]  Indio Pereira MD GN:[id number] CULTURE, TISSUE Vallery Peed STAIN [[id number]]  (Abnormal)  (Susceptibility) Collected:  01/30/20 1434    Order Status:  Completed Specimen:  Tissue Updated:  02/02/20 0751     Special Requests: NO SPECIAL REQUESTS        GRAM STAIN FEW WBC'S         NO ORGANISMS SEEN        Culture result: FEW STAPHYLOCOCCUS AUREUS         FEW CITROBACTER KOSERI       CULTURE, WOUND [address] [[id number]]  (Abnormal)  (Susceptibility) Collected:  01/29/20 0500    Order Status:  Completed Specimen:  Wound from Heel Updated:  02/02/20 0730     Special Requests: NO SPECIAL REQUESTS        GRAM STAIN NO WBC'S SEEN         NO ORGANISMS SEEN        Culture result: FEW CITROBACTER KOSERI               FEW STAPHYLOCOCCUS SPECIES, COAGULASE NEGATIVE            FEW STAPHYLOCOCCUS AUREUS       CULTURE, WOUND Vallery Peed STAIN [[id number]]  (Abnormal)  (Susceptibility) Collected:  01/29/20 0500    Order Status:  Completed Specimen:  Wound from Ankle Updated:  02/01/20 0726     Special Requests: NO SPECIAL REQUESTS        GRAM STAIN MANY WBC'S               MODERATE GRAM POSITIVE COCCI IN GROUPS           Culture result:       MODERATE STAPHYLOCOCCUS AUREUS                  MODERATE CITROBACTER KOSERI          AFB CULTURE + SMEAR W/RFLX ID FROM CULTURE [[id number]] Collected:  01/30/20 1430    Order Status:  Completed Specimen:  Miscellaneous sample Updated:  02/01/20 0636     Source LEFT HEEL        AFB Specimen processing Concentration     Acid Fast Smear NEGATIVE         Comment: (NOTE)  Performed At: [address] [id number]  Nuria Cornejo MD RC:[id number]          Acid Fast Culture PENDING    Naomi Sibley) [[id number]] Collected:  01/30/20 1430    Order Status:  Completed Specimen:  Various Updated:  01/30/20 [address], MD  Cell [phone]  [facility]   2/3/2020   11:40 AM

## 2020-02-03 NOTE — PROGRESS NOTES
[address] [place] [address], [address]  [place], [address] [place] [address]  [phone](O)  [phone](E)  [phone](T)     A//P:  1. Osteomyelitis left foot heel  2. Sepsis:leukocytosis slowly improving  3. Dr. Mhaesh Moreno has made antibiotic changes based on labs and recent rash by patient. 4. Dressing changed by me (iodoform packing of left foot heel wound)  5. Continue antibiotics per Dr. Mahesh Moreno  6. Non wt bearing left foot, but Physical therapy will work with patient to be ambulatory  7. Upon D/C and negative wound cultures patient will come back as out patient for secondary closure of posterior left heel wound. Hospital Problems  Date Reviewed: 1/30/2020           Codes Class Noted POA     Hypoglycemia ICD-10-CM: E16.2  ICD-9-CM: 650. 2   1/30/2020 Unknown           Dehydration ICD-10-CM: E86.0  ICD-9-CM: 276.51   1/29/2020 Yes           ESRD on hemodialysis (HCC) ICD-10-CM: N18.6, Z99.2  ICD-9-CM: 585.6, V45.11   1/29/2020 Yes           * (Principal) Cellulitis and abscess of foot ICD-10-CM: L03.119, L02.619  ICD-9-CM: 682. 7   1/29/2020 Yes           HTN (hypertension) ICD-10-CM: I10  ICD-9-CM: 401. 9   1/29/2020 Yes           Acute osteomyelitis ([facility] [address] ICD-10-CM: M86.10  ICD-9-CM: 730.00   1/29/2020 Yes           Hx of BKA, right ([facility] [address] ICD-10-CM: Z89.511  ICD-9-CM: V49.75   1/29/2020 Yes           Esophagitis ICD-10-CM: K20.9  ICD-9-CM: 530.10   1/29/2020 Unknown           Anemia of chronic disease ICD-10-CM: D63.8  ICD-9-CM: 285.29   2/28/2018 Yes           S/  Patient continues to feel better each day, aside from rash he says. Only minor foot pain during dressing change. No SOB, leg pains, minor nausea. O/  Alert & oriented x 3, very talkative and more energy. Left foot is Status post aggressive limb salvage attempt:partial calcanectomy left foot, I&D, bone biopsy.      Plantar left foot heel is co-apted with sutures intact, posterior heel wound with packing and no purulent drainage, cellulitis, calor, foul odor nor dolor. Results for González Boland (MRN [id number]) as of 2/3/2020 17:43   Ref. Range 1/30/2020 05:00 1/31/2020 02:20 2/1/2020 06:49 2/3/2020 04:39   WBC Latest Ref Range: 4.6 - 13.2 K/uL 23.3 (H) 26.2 (H) 26.0 (H) 22.0 (H)   RBC Latest Ref Range: 4.70 - 5.50 M/uL 4.04 (L) 3.89 (L) 3.37 (L) 3.29 (L)   HGB Latest Ref Range: 13.0 - 16.0 g/dL 9.8 (L) 9.4 (L) 8.2 (L) 8.0 (L)   HCT Latest Ref Range: 36.0 - 48.0 % 32.2 (L) 30.5 (L) 26.3 (L) 25.1 (L)   MCV Latest Ref Range: 74.0 - 97.0 FL 79.7 78.4 78.0 76.3   MCH Latest Ref Range: 24.0 - 34.0 PG 24.3 24.2 24.3 24.3   MCHC Latest Ref Range: 31.0 - 37.0 g/dL 30.4 (L) 30.8 (L) 31.2 31.9   RDW Latest Ref Range: 11.6 - 14.5 % 19.9 (H) 19.8 (H) 19.6 (H) 19.3 (H)   PLATELET Latest Ref Range: 135 - 420 K/uL 212 205 188 207   MPV Latest Ref Range: 9.2 - 11.8 FL 9.5 9.3 9.0 (L) 9.3   NEUTROPHILS Latest Ref Range: 40 - 73 % 88 (H) 73 95 (H) 89 (H)   BAND NEUTROPHILS Latest Ref Range: 0 - 5 % 3 17 (H)     LYMPHOCYTES Latest Ref Range: 21 - 52 % 5 (L) 1 (L) 3 (L) 4 (L)   MONOCYTES Latest Ref Range: 3 - 10 % 4 8 1 (L) 3   EOSINOPHILS Latest Ref Range: 0 - 5 % 0 1 1 4   BASOPHILS Latest Ref Range: 0 - 2 % 0 0 0 0   DF Latest Units:   MANUAL MANUAL MANUAL AUTOMATED   ABS. NEUTROPHILS Latest Ref Range: 1.8 - 8.0 K/UL 22.0 (H) 19.1 (H) 24.6 (H) 19.6 (H)   ABS. LYMPHOCYTES Latest Ref Range: 0.9 - 3.6 K/UL 0.4 (L) 0.3 (L) 0.8 0.9   ABS. MONOCYTES Latest Ref Range: 0.05 - 1.2 K/UL 0.8 2.1 (H) 0.3 0.7   ABS. EOSINOPHILS Latest Ref Range: 0.0 - 0.4 K/UL 0.1 0.3 0.3 0.9 (H)      Results for González Boland (MRN [id number]) as of 2/3/2020 17:43   Ref.  Range 2/1/2020 06:49 2/2/2020 04:08 2/3/2020 04:39   Sodium Latest Ref Range: 136 - 145 mmol/L 138  137   Potassium Latest Ref Range: 3.5 - 5.5 mmol/L 3.7  4.0   Chloride Latest Ref Range: 100 - 111 mmol/L 103  104   CO2 Latest Ref Range: 21 - 32 mmol/L 28  26   Anion gap Latest Ref Range: 3.0 - 18 mmol/L 7  7   Glucose Latest Ref Range: 74 - 99 mg/dL 168 (H)  41 (LL)   BUN Latest Ref Range: 7.0 - 18 MG/DL 19 (H)  30 (H)   Creatinine Latest Ref Range: 0.6 - 1.3 MG/DL 5.46 (H)  7.64 (H)   BUN/Creatinine ratio Latest Ref Range: 12 - 20   3 (L)  4 (L)   Calcium Latest Ref Range: 8.5 - 10.1 MG/DL 7.9 (L)  7.9 (L)   Phosphorus Latest Ref Range: 2.5 - 4.9 MG/DL   2.6   Magnesium Latest Ref Range: 1.6 - 2.6 mg/dL 1.7 1.8 1.8   GFR est non-AA Latest Ref Range: >60 ml/min/1.73m2 11 (L)  8 (L)   GFR est AA Latest Ref Range: >60 ml/min/1.73m2 14 (L)  9 (L)   Bilirubin, total Latest Ref Range: 0.2 - 1.0 MG/DL  0.8    Bilirubin, direct Latest Ref Range: 0.0 - 0.2 MG/DL  0.6 (H)    Protein, total Latest Ref Range: 6.4 - 8.2 g/dL  6.5    Albumin Latest Ref Range: 3.4 - 5.0 g/dL  2.0 (L)    Globulin Latest Ref Range: 2.0 - 4.0 g/dL  4.5 (H)    A-G Ratio Latest Ref Range: 0.8 - 1.7    0.4 (L)    ALT (SGPT) Latest Ref Range: 16 - 61 U/L  18    AST Latest Ref Range: 10 - 38 U/L  25    Alk.  phosphatase Latest Ref Range: 45 - 117 U/L  145 (H)          GRAM STAIN FEW WBC'S      Final   GRAM STAIN NO ORGANISMS SEEN      Final   Culture result: Abnormal       Final   FEW STAPHYLOCOCCUS AUREUS    Culture result: Abnormal       Final   FEW CITROBACTER KOSERI    Susceptibility     Staphylococcus aureus     Antibiotic Interpretation Value Method Comment   Ampicillin/sulbactam ($) Susceptible DEDUCED SENSITIVE ug/mL MAURO    Penicillin G ($$) Resistant >=0.5 ug/mL MAURO    Cefazolin ($) Susceptible DEDUCED SENSITIVE ug/mL MAURO    Clindamycin ($) Resistant >=8 ug/mL MAURO    Erythromycin ($$$$) Resistant >=8 ug/mL MAURO    Gentamicin ($) Susceptible <=0.5 ug/mL MAURO    Levofloxacin ($) Susceptible 0.25 ug/mL MAURO    Oxacillin Susceptible <=0.25 ug/mL MAURO    Rifampin ($$$$) Susceptible <=0.5 ug/mL MAURO    Tetracycline Susceptible <=1 ug/mL MAURO    Vancomycin ($) Susceptible <=0.5 ug/mL MAURO    Trimeth-Sulfamethoxa Susceptible <=10 ug/mL MAURO    Tigecycline ($$$$) Susceptible <=0.12 ug/mL MAURO    Citrobacter koseri     Antibiotic Interpretation Value Method Comment   Cefazolin ($) Susceptible <=4 ug/mL MAURO    Cefepime ($$) Susceptible <=1 ug/mL MAURO    Ceftazidime ($) Susceptible <=1 ug/mL MAURO    Ceftriaxone ($) Susceptible <=1 ug/mL MAURO    Ciprofloxacin ($) Susceptible <=0.25 ug/mL MAURO    Gentamicin ($) Susceptible <=1 ug/mL MAURO    Imipenem Susceptible <=0.25 ug/mL MAURO    Levofloxacin ($) Susceptible <=0.12 ug/mL MAURO    Piperacillin/Tazobac ($) Susceptible <=4 ug/mL MAURO    Tobramycin ($) Susceptible <=1 ug/mL MAURO    Trimeth-Sulfamethoxa Susceptible <=20 ug/mL MAURO     Condensed View   Susceptibility     Staphylococcus aureus (1)     Antibiotic Interpretation Method Status    Ampicillin/sulbactam ($) Susceptible MAURO Final    Penicillin G ($$) Resistant MAURO Final    Cefazolin ($) Susceptible MAURO Final    Clindamycin ($) Resistant MAURO Final    Erythromycin ($$$$) Resistant MAURO Final    Gentamicin ($) Susceptible MAURO Final    Levofloxacin ($) Susceptible MAURO Final    Oxacillin Susceptible MAURO Final    Rifampin ($$$$) Susceptible MAURO Final    Tetracycline Susceptible MAURO Final    Vancomycin ($) Susceptible MAURO Final    Trimeth-Sulfamethoxa Susceptible MAURO Final    Tigecycline ($$$$) Susceptible MAURO Final    Citrobacter koseri (2)     Antibiotic Interpretation Method Status    Cefazolin ($) Susceptible MAURO Final    Cefepime ($$) Susceptible MAURO Final    Ceftazidime ($) Susceptible MAURO Final    Ceftriaxone ($) Susceptible MAURO Final    Ciprofloxacin ($) Susceptible MAURO Final    Gentamicin ($) Susceptible MAURO Final    Imipenem Susceptible MAURO Final    Levofloxacin ($) Susceptible MAURO Final    Piperacillin/Tazobac ($) Susceptible MAURO Final    Tobramycin ($) Susceptible MAURO Final    Trimeth-Sulfamethoxa Susceptible MAURO Final     Condensed View

## 2020-02-03 NOTE — PROGRESS NOTES
Bedside and Verbal shift change report given to Eber Rodriguez RN (oncoming nurse) by Deepthi Beaver RN (offgoing nurse). Report included the following information SBAR, Kardex, Procedure Summary, Intake/Output, MAR and Recent Results      Assessment completed. Patient is resting in the bed at this time. Patient is receiving dialysis. Patient has been stuck multiple times in order to gain IV access but has been unsuccessful. 65- Dr. Sam Neri paged to be notified that patient continues to have no IV access and that patient refuses to be stuck again. Dr. Sam Neri stated she wants an EJ placed. Called and spoke to the medic who stated that he would come up and try however, they do not place them often. I also attempted to call IR to see if they placed them and they do not. 1830-Patient has been requesting to sit on the bedpan for extended periods of time throughout the shift, therefore the patient has a skin tear on his buttocks, a Mepilex has been placed on the area. Patient has been educated that he cannot stay on the bedpan for extended periods of time. Bedside and Verbal shift change report given to Bianca Cuellar RN (oncoming nurse) by Eber Rodriguez RN (offgoing nurse). Report included the following information SBAR, Kardex, Procedure Summary, Intake/Output, MAR and Recent Results.

## 2020-02-03 NOTE — DIALYSIS
TREATMENT SUMMARY   Patient dialyzed in room 304  LUE AVG difficult to access on lower graft pt states hx of clots  Post HD heavy clotting noted in arterial and venous chambers of circuit.       500 ml removed via UF with a with a net removal of 0 ml   Report given to Georgette Epps RN with all questions answered     TREATMENT  NOTES                                                                                                     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       Code Status [x]Full Code [] DNR  [] Other_____   Diet: [x] Renal [] NPO [] Diabetic   [] Enteral Feeding [] Cardiac Diabetic: []Yes [x]No     [x]Signed Treatment Consent Verified   [x] Time Out/ Safety Check   PRIMARY NURSE REPORT: FIRST INITIAL/ LAST NAME/TITLE  PRE DIALYSIS: Georgette Epps RN            TIME: [facility] ACCESS: [x] N/A  [] RIGHT  [] LEFT  [] IJ  [] SUBCL [] FEM                    [] First use X-ray  [] Tunnel     [] Non-Tunneled      [] No S/S infection  [] Redness [] Drainage  [] Cultured [] Swelling [] Pain                    [] Medical Aseptic [] Prep Dressing Changed                  [] Clotted [] Patent []      Flows: [] Good [] Poor [] Reversed                 If Access Problem Dr. Roseline Malone: [] Yes [] No    Date:_____  [] N/A[]   GRAFT/FISTULA ACCESS:  [] N/A  [] RIGHT  [x] LEFT  [x] UE   [] LE       [x] AVG  [] AVF [] BUTTONHOLE    [x] +BRUIT/THRILL [x] MEDICAL ASEPTIC PREP     [x] No S/S infection  [] Redness [] Drainage  [] Cultured [] Swelling [] Pain If Access Problem Dr. Shari Lebron: [] Yes [] No    Date:______ [] N/A   GENERAL ASSESSMENT   LUNGS:  SaO2% __98__ [x] Clear [] Coarse [] Crackles [] Wheezing                                         [x] Diminished   Location: [x] RLL [x] LLL [x] RUL [x] RML [x] KYLAH    COUGH:  [] Productive  [] Loose[] Dry [x] N/A   RESPIRATIONS: [x] Easy []Labored    THERAPY: [x] RA   [] NC _____. L/min    Mask: [] NRB [] Venti  _____O2%                  [] Ventilator [] Intubated [] Trach [] BiPap [] CPap [] HI Flow   CARDIAC: [x] Regular [] Irregular [] Pericardial Rub [] JVD               Monitored Rhythm:__not monitored____ [x] N/A   EDEMA: [x] None [] Generalized [] Facial [] Pedal [] UE [] LE             [] Pitting [] 1 [] 2 [] 3 [] 4    [] Right [] Left [] Bilateral   SKIN:    [x] Warm [] Hot [] Cold  [x] Dry [] Pale [] Diaphoretic              [] Flushed [] Jaundiced [] Cyanotic [x] Rash FULL BODY RASH MD AWARE   [] Weeping     LOC:    [x] Alert  Oriented to: [x] Person [x] Place [x] Time             [] Confused [] Lethargic [] Medicated [] Non-responsive    GI/ABDOMEN: [] Flat [] Distended [x] Soft [] Firm [] Diarrhea [x] Bowel Sounds Present [] Nausea [] Vomiting    PAIN: [x] 0 [] 1 [] 2 [] 3 [] 4 [] 5 [] 6 [] 7 [] 8 [] 9 [] 10          Scale 1-10 Action/Follow Up_____   MOBILITY: [] Amb [] Amb/Assist [x] Bed  [] Wheelchair    CURRENT LABS   HBsAg ONLY: Date Drawn: 1/29/2020            [x] Negative [] Positive [] Unknown.      HBsAb: Date Drawn: 1/29/2020             [] Susceptible <10 [x] Immune ?10 [] Unknown   Date of Current Labs:  ATTACHED     EDUCATION   Person Educated: [x] Patient [] Other_________   Knowledge base: [] None [x] Minimal [] Substantial    Barriers to learning  [x] None _______________   Preferred method of learning: [] Written [x] Oral [x] Visual [] Hands on    Topic: [x] Access Care [] S&S of infection [x] Fluid Management   [] K+  [x] Procedural  [] Albumin [] Medications [] Tx Options   [] Transplant [] Diet [] Other    Teaching Tools: [x] Explain [x] Demonstration [] Handout_____ [] Video______  CARE PLAN    [x] Renal Failure (Adult)  Interdisciplinary  · Fluid and electrolytes stabilized  ? Interventions  · Dehydration signs and symptoms (eg: Weight/lab monitoring; vomiting/diarrhea/urine; tenting; mucous membranes; dizziness/lethargy/irritability/confusion; weak pulse; tachycardia; blood pressure; I&O)  · Fluid overload signs and symptoms assessment (eg: Body weight increased; dyspnea; edema; hypertension; respiratory crackles/wheezing; JVD; lab monitoring; mental status changes; I&O)  · Monitor appropriate lab values  · COMPLIANCE WITH PRESCRIBED THERAPY  · ARTERIAL ACCESS SITE ASSESSMENT  · NUTRITION SCREENING  · Vital signs monitoring per assessed patient condition or unit standard  · Cardiac monitoring  · Hydration management  · Intake and output measurement  · Body weight monitoring  · Skin care  · DIALYSIS  · Nutrition Care Process per nutrition screen  · Oral hygiene care every 2 hours  · Pain management     · Outcome   ? [x] Progressing Towards Goal  ? [] Not Progressing Towards Goals  ? [] Goals Met/Resolved  ? [] Goals Not Met/ Resolved        · Patient/ Family Education  ?  Progressing Towards Goals          RO/HEMODIAYLSIS MACHINE SAFETY CHECKS- BEFORE EACH TREATMENT          [] [facility]: Machine Serial #1:  [id number]   RO Serial #[id number]                       [x] [facility]: Machine Serial #2:  [id number]    Serial #[id number]    Alarm Test: [x] Pass  Time___1058_____  [x] RO/Machine Log Complete    [x] Extracorporeal circuit Tested for integrity           Dialyzer_[id number]_________   Tubing__19I20-8__________    Dialysate: pH__7.4_  Temp.__37___Conductivity: Meter __14.2__ HD Machine__13.9_   CHLORINE TESTING- BEFORE EACH TREATMENT AND EVERY 4 HOURS   Total Chlorine: [x] Less than 0.1 ppm Time:__1100___2nd Check Time:__na____  (If greater than 0.1 ppm from Primary then every 30 minutes from Secondary)   TREATMENT INIATION-WITH DIALYSIS PRECAUTIONS   [x] All Connections Secured   [x] Saline Line Double Clamped    [x] Venous Parameters Set [x] Arterial Parameters Set    [x] Prime Given 250 ml     [x] Air Foam Detector Engaged   PRE-TREATMENT   UF Calculations: Wt to lose:_0___ml(+) Oral:_0_ml(+)IV Meds/Fluids/Blood prods_0__ml(+) Prime/Rinse__500_ml(=)Total UF Goal_500_mL   Scale Type:[x] Bed scale [] Sling Scale [] Wheel Chair Scale []  Not Ordered [] [] Unable to obtain pt on stretcher/ bed scale malfunctioning   Tx Initiation Note: RECEIVED REPORT. PATIENT ALERT AND ORIENTED. NAD. ALL QUESTIONS ANSWERED WITH PATIENT  SAFETY CHECKS COMPLETE. TIME OUT COMPLETE. TREATMENT INITIATED VIA __AVG___. NO CONCERNS NOTED. [x] Time Out/Safety Check  Time:_1110____   INTRADIALYTIC MONITORING  (SEE ATTACHED FLOWSHEET)     POST TREATMENT    Time Medication Dose Volume Route    Initials                                           [facility] Signatures Title Initials Time   STEPHEN ZHOU RN PAP 1530               Dialyzer cleared: [x] Good [] Fair [] Poor     Blood Volume Processed __62.9__L   Net UF Removed __0__mL  Post Tx Access:                  AVF/AVG: Bleeding Stop Time      Art.   6_min Chaim._6_NA_min []+bruit/thrill                              Catheter: Locking Solution  [] Heparin 1 ml/1000 units                                                             [] Normal Saline                                                                      Art._____ ml Chaim._____ml                                                           [x] New caps placed  Post Assessment:              Skin: [x]Warm [x]Dry []Diaphoretic []Flushed [] Pale []Cyanotic            Lungs: [x]Clear []Coarse []Crackles []Wheezing             Cardiac: [x]Regular []Irregular  []Monitored rhythm____ []N/A            Edema: [x]None []General []Facial []Pedal  []UE []LE []RIGHT []LEFT            Pain: [x]0 []1 []2 []3 []4 []5 []6 []7 []8 []9 []10   POST Tx Note:TREATMENT COMPLETED. ALL POSSIBLE BLOOD RETURNED. PT TOLERATED WELL.  NAD. DE CANULATED ARTERIAL AND VENOUS SITES WITHOUT INCIDENT. REPORT GIVEN WITH OPPORTUNITY TO ADDRESS ANY CONCERNS OR QUESTTIONS AND PATIENT STAYS ROOM IN BED WITHOUT DISTRESS OR ACUTE DISCOMFORT. BED LOWED, CALL BELL WITHIN REACH .    Primary Nurse Report: First initial/Last name/Title    Post Dialysis:__Fabian Ramey RN_____         Time:___1500_____________    Abbreviations: AVG-arterial venous graft, AVF-arterial venous fistula, IJ-Internal Jugular,  Subcl-Subclavian, Fem-Femoral, Tx-treatment, AP/HR-apical heart rate, DFR-dialysate flow rate, BFR-blood flow rate, AP-arterial pressure, -venous pressure, UF-ultrafiltrate, TMP-transmembrane pressure, Chaim-Venous, Art-Arterial, RO-Reverse Osmosis

## 2020-02-03 NOTE — PROGRESS NOTES
NUTRITION UPDATE    -asked to look at pt due to poor appetite; pt was very sleepy during nutritional interview; believe that poor appetite is related to diet as pt said there is nothing for him to eat due to diet order    Supplement: Add Nepro TID to help with poor appetite-will monitor as pt believes these caused him to go to the bathroom before    Other: Add daily Phos labs  Will continue to monitor           Adrian Reich, [address] [place] [address]  PAGER:  [phone]

## 2020-02-03 NOTE — PROGRESS NOTES
PT session held due to:  []  Nausea/vomiting  []  RN Communication/ suggestion  []  Extreme Pain  [x]  Dialysis treatment in progress. Will f/u later as pt's schedule allows. Thank you.   Iris Avendaño, PTA

## 2020-02-03 NOTE — PROGRESS NOTES
Hospitalist Progress Note-critical care note     Patient: Shane Avila. MRN: [id number]  [facility]: [id number]    YOB: 1970  Age: 54 y.o. Sex: male    DOA: 1/29/2020 LOS:  LOS: 5 days            Chief complaint: hypoglycemia, esrd on HD, cellulitis and abscess of foot. HTN     Assessment/Plan         Hospital Problems  Date Reviewed: 1/30/2020          Codes Class Noted POA    Hypoglycemia ICD-10-CM: E16.2  ICD-9-CM: 251.2  1/30/2020 Unknown        Dehydration ICD-10-CM: E86.0  ICD-9-CM: 276.51  1/29/2020 Yes        ESRD on hemodialysis [facility]) ICD-10-CM: N18.6, Z99.2  ICD-9-CM: 585.6, V45.11  1/29/2020 Yes        * (Principal) Cellulitis and abscess of foot ICD-10-CM: L03.119, L02.619  ICD-9-CM: 682.7  1/29/2020 Yes        HTN (hypertension) ICD-10-CM: I10  ICD-9-CM: 401.9  1/29/2020 Yes        Acute osteomyelitis ([facility] [address] ICD-10-CM: M86.10  ICD-9-CM: 730.00  1/29/2020 Yes        Hx of BKA, right ([facility] [address] ICD-10-CM: Z89.511  ICD-9-CM: V49.75  1/29/2020 Yes        Esophagitis ICD-10-CM: K20.9  ICD-9-CM: 530.10  1/29/2020 Unknown        Anemia of chronic disease ICD-10-CM: D63.8  ICD-9-CM: 285.29  2/28/2018 Yes              Cellulitis and abscess of the foot, acute osteomyelitis    INCISION AND DRAINAGE, WITH AGGRESSIVE DEBRIDEMENT OF CALCANEOUS ,BIOPSY OF LEFT FOOT WITH C-ARM per Dr. Ky Bloch -need second surgery if respond to current treatment   Dr[facility]  [facility]   [facility] [address]   daptomycin and zosyn     Dehydration  Resolved      End-stage renal disease on HD  Hd today   Scheduled for HD Tuesday Thursday and Saturday,  HD per renal        Hypotension-resolved     htn   Will start hme medication, coreg and imdur  Continue to hold hydralyzine      Right BKA  Fall precaution.     Chronic anemia due to end-stage renal disease  Continue monitor H&H-stable from medication      Esophagitis   ppi      Hypoglycemia   Resolved -add snack at bedtime.  Low rate  d5     Subjective: my skin peeling, I do not want to go to rehab     RN: no iv access     Will continue iv abx ,f/u with final id consult. Cm for abx and d.c planning, will give some mg ,restart meds for htn, got iv access AM   D/c 1-2 days   Review of systems:    General: No fevers or chills. Tired   Cardiovascular: No chest pain or pressure. No palpitations. Pulmonary: No shortness of breath. Gastrointestinal: No nausea, vomiting. Vital signs/Intake and Output:  Visit Vitals  /73 (BP 1 Location: Right arm, BP Patient Position: At rest)   Pulse (!) 107   Temp 98.2 °F (36.8 °C)   Resp 18   Ht 5' 7\" (1.702 m)   Wt 86.6 kg (191 lb)   SpO2 99%   BMI 29.91 kg/m²     Current Shift:  No intake/output data recorded. Last three shifts:  02/01 1901 - 02/03 0700  In: 0   Out: 200 [Urine:200]    Physical Exam:  General: WD, WN. Alert, cooperative, no acute distress    HEENT: NC, Atraumatic. PERRLA, anicteric sclerae. Lungs: CTA Bilaterally. No Wheezing/Rhonchi/Rales. Heart:  Regular  rhythm,  No murmur, No Rubs, No Gallops  Abdomen: Soft, Non distended, Non tender.  +Bowel sounds,   Extremities: No c/c. Rt bka, Left foot wrapped with ace bandage   Psych:   Not anxious or agitated. Neurologic:  No acute neurological deficit. Labs: Results:       Chemistry Recent Labs     02/03/20 0439 02/02/20  0408 02/01/20  0649   GLU 41*  --  168*     --  138   K 4.0  --  3.7     --  103   CO2 26  --  28   BUN 30*  --  19*   CREA 7.64*  --  5.46*   CA 7.9*  --  7.9*   AGAP 7  --  7   BUCR 4*  --  3*   AP  --  145*  --    TP  --  6.5  --    ALB  --  2.0*  --    GLOB  --  4.5*  --    AGRAT  --  0.4*  --       CBC w/Diff Recent Labs     02/03/20 0439 02/01/20  0649   WBC 22.0* 26.0*   RBC 3.29* 3.37*   HGB 8.0* 8.2*   HCT 25.1* 26.3*    188   GRANS 89* 95*   LYMPH 4* 3*   EOS 4 1      Cardiac Enzymes No results for input(s): CPK, CKND1, WILLIE in the last 72 hours.     No lab exists for component: Karen Soto Coagulation No results for input(s): PTP, INR, APTT, INREXT, INREXT in the last 72 hours. Lipid Panel No results found for: CHOL, CHOLPOCT, CHOLX, CHLST, CHOLV, 396853, HDL, HDLP, LDL, LDLC, DLDLP, 227402, VLDLC, VLDL, TGLX, TRIGL, TRIGP, TGLPOCT, CHHD, CHHDX   BNP No results for input(s): BNPP in the last 72 hours.    Liver Enzymes Recent Labs     02/02/20  0408   TP 6.5   ALB 2.0*   *   SGOT 25      Thyroid Studies No results found for: T4, T3U, TSH, TSHEXT, TSHEXT     Procedures/imaging: see electronic medical records for all procedures/Xrays and details which were not copied into this note but were reviewed prior to creation of Lucia Strong MD

## 2020-02-03 NOTE — ROUTINE PROCESS
Bedside and Verbal shift change report given to ELEUTERIO Wahl (oncoming nurse) by Reba Johnson RN (offgoing nurse). Report included the following information SBAR, Kardex, Intake/Output, MAR and Recent Results.

## 2020-02-03 NOTE — CDMP QUERY
Pt admitted with cellulitis. Pt noted to have Sepsis by Dr. Claudetta Ebbs PN. If possible, please document in the progress notes and d/c summary if you are evaluating and / or treating any of the following: 
 
? Sepsis, present on admission, suspected or probable causative organism (please specify) ? Sepsis, now resolved, suspected or probable causative organism (please specify) ? Sepsis, not present on admission, suspected or probable causative organism (please specify) ? No Sepsis, suspected or probable localized infection (please specify) ? Sepsis was ruled out (include corresponding diagnosis for patients clinical picture and treatment) ? Other, please specify ? Clinically unable to determine The medical record reflects the following: 
 
   Risk Factors: Cellulitis and abscess Clinical Indicators:  
 > WBC 26.2, 26.0, 22 
 > Culture result: Abnormal        
FEW STAPHYLOCOCCUS AUREUS Culture result: Abnormal        
RARE CITROBACTER Durquincy Milian Treatment: Receiving ANCEF, clindamycin, Levaquin Thank you, Angelica Holloway, RN, BSN, CDI

## 2020-02-03 NOTE — PROGRESS NOTES
Assessment:     ESRD  DM  HTN  Anemia of CKD  Foot ulcer     Plan:    HD  MWF  Left foot ulcer per primary team  Ensure clear  abx have been changed due to his exfoliative skin rash           CC: ESRD, has pain,   Interval History: No change since yesterday     Subjective:   No change since i saw pt last. No new symptoms or issues.         Review of Systems:  Negative for Edema, SOB       Blood pressure 163/73, pulse (!) 107, temperature 98.2 °F (36.8 °C), resp. rate 18, height 5' 7\" (1.702 m), weight 86.6 kg (191 lb), SpO2 99 %.       awake and alert   NAD  Exfoliative skin changes noted    Intake/Output Summary (Last 24 hours) at 2/3/2020 0930  Last data filed at 2/3/2020 0600  Gross per 24 hour   Intake --   Output 200 ml   Net -200 ml      Recent Labs     02/03/20  0439   WBC 22.0*     Lab Results   Component Value Date/Time    Sodium 137 02/03/2020 04:39 AM    Potassium 4.0 02/03/2020 04:39 AM    Chloride 104 02/03/2020 04:39 AM    CO2 26 02/03/2020 04:39 AM    Anion gap 7 02/03/2020 04:39 AM    Glucose 41 (LL) 02/03/2020 04:39 AM    BUN 30 (H) 02/03/2020 04:39 AM    Creatinine 7.64 (H) 02/03/2020 04:39 AM    BUN/Creatinine ratio 4 (L) 02/03/2020 04:39 AM    GFR est AA 9 (L) 02/03/2020 04:39 AM    GFR est non-AA 8 (L) 02/03/2020 04:39 AM    Calcium 7.9 (L) 02/03/2020 04:39 AM        Current Facility-Administered Medications   Medication Dose Route Frequency Provider Last Rate Last Dose    magnesium sulfate 2 g/50 ml IVPB (premix or compounded)  2 g IntraVENous Raciel Cueva MD        dextrose 5% infusion  25 mL/hr IntraVENous CONTINUOUS MD Gwendolyn Magdaleno.[place] Jacqlyn Felty ON 2/5/2020] epoetin delonte-epbx (RETACRIT) injection 9,000 Units  9,000 Units SubCUTAneous DIALYSIS MON, WED & FRI Frantz, Frannymed R, DO        DAPTOmycin (CUBICIN) 500 mg in water, bacteriostatic (WATER) 10 mL IV syringe RF formulation  500 mg IntraVENous Once per day on Mon Wed Fri Irma Gtz MD        albumin human 25% (BUMINATE) solution 25 g 25 g IntraVENous DIALYSIS PRN Yanet Landry R, DO   25 g at 01/31/20 1735    glucose chewable tablet 16 g  4 Tab Oral PRN Stephanie Arambula MD        glucagon (GLUCAGEN) injection 1 mg  1 mg IntraMUSCular PRN Stephanie Arambula MD        dextrose 10% infusion 125-250 mL  125-250 mL IntraVENous PRN Stephanie Arambula  mL/hr at 02/02/20 0755 250 mL at 02/02/20 0755    promethazine (PHENERGAN) 25 mg in 0.9% sodium chloride 50 mL IVPB  25 mg IntraVENous Q4H PRN Stephanie Arambula MD        morphine 10 mg/ml injection 4 mg  4 mg IntraVENous Q3H PRN Stephanie Arambula MD   4 mg at 01/30/20 1916    sodium chloride 0.45 % in dextrose 10% 1,019.6 mL infusion   IntraVENous CONTINUOUS Laura Jacobs, DO 75 mL/hr at 01/31/20 0737      sodium chloride (NS) flush 5-40 mL  5-40 mL IntraVENous Q8H MawJen fajardot, DPM   10 mL at 02/02/20 1630    sodium chloride (NS) flush 5-40 mL  5-40 mL IntraVENous PRN MaJen crookst, DPM        acetaminophen (TYLENOL) tablet 650 mg  650 mg Oral Q6H MawJen fajardot, DPM   650 mg at 02/03/20 0556    HYDROmorphone (DILAUDID) tablet 2 mg  2 mg Oral Q4H PRN MaJen crookst, DPM        HYDROmorphone (PF) (DILAUDID) injection 1 mg  1 mg IntraVENous Q4H PRN MaJen crookst, DPM        naloxone (NARCAN) injection 0.4 mg  0.4 mg IntraVENous PRN Petey Carpenter, DPM        influenza vaccine 2019-20 (6 mos+)(PF) (FLUARIX/FLULAVAL/FLUZONE QUAD) injection 0.5 mL  0.5 mL IntraMUSCular PRIOR TO DISCHARGE Josselyn Jc MD        acetaminophen (TYLENOL) tablet 650 mg  650 mg Oral Q4H PRN Josselyn Jc MD   650 mg at 01/29/20 2017    naloxone [facility]) injection 0.4 mg  0.4 mg IntraVENous SHANI Jc MD        diphenhydrAMINE (BENADRYL) injection 12.5 mg  12.5 mg IntraVENous Q4H SHANI Jc MD        ondansetron [facility]) injection 4 mg  4 mg IntraVENous Q4H SHANI Jc MD   4 mg at 01/30/20 0150    heparin (porcine) injection 5,000 Units  5,000 Units SubCUTAneous Q8H Nikkie Rhys Crawford MD   5,000 Units at 02/02/20 2311    hydrALAZINE (APRESOLINE) 20 mg/mL injection 10 mg  10 mg IntraVENous Q6H PRN Scarlet Gannon MD        piperacillin-tazobactam (ZOSYN) 2.25 g in 0.9% sodium chloride (MBP/ADV) 50 mL MBP  2.25 g IntraVENous Q8H Scarlet Gannon MD   Stopped at 02/02/20 1600    carvediloL (COREG) tablet 25 mg  25 mg Oral BID WITH MEALS Scarlet Gannon MD   Stopped at 01/30/20 1006    isosorbide mononitrate ER (IMDUR) tablet 60 mg  60 mg Oral DAILY Scarlet Gannon MD       [facility] [employer] by provider] hydrALAZINE (APRESOLINE) tablet 25 mg  25 mg Oral TID Scarlet Gannon MD   Stopped at 01/29/20 2200    pantoprazole (PROTONIX) 40 mg in 0.9% sodium chloride 10 mL injection  40 mg IntraVENous DAILY Scarlet Gannon MD   40 mg at 02/02/20 1059

## 2020-02-03 NOTE — PROGRESS NOTES
Chart reviewed, noted previous CM note pt does not want SNF. Pt lives alone, has brother near by to provide transportation. Pt goes to [facility] in [place] [phone]; confirmed with center he is TTS 11:45 arrival for 12 noon chair appointment. Will have CMS fax updates [phone], will update when ready for discharge. CM following to coordinate discharge with IV abx. Awaiting to see if he will receive at HD. If not, will need tunneled catheter for delivery of IV abx.     1600- spoke with pt, obtained copies of what pt states are his current insurance cards. Copies scanned into chart and [address] [place] [address] [address] to verify what insurance pt has. Will obtain order for wheelchair with foot rests and drop arm for pt as he has slide board. Pt states he uses Medicaid transport for his HD appointments. Pt again states he does not want SNF.

## 2020-02-03 NOTE — PROGRESS NOTES
Pt refused PT due to:  []  Nausea/vomiting  []  Eating  []  Pain  []  Pt lethargic  [x]  On on phone and also on bed pan. \" I can't right now\" Food was also arriving. Will f/u tomorrow. Thank you.   Rehan Romero, PTA

## 2020-02-03 NOTE — PROGRESS NOTES
8620- Laboratory called with critical glucose level of 41. Checked on patient, patient is not having erasmo S/S. Checked BS at bedside and glucose was 79. Will continue to monitor patient.

## 2020-02-03 NOTE — PROGRESS NOTES
Bedside shift change report given to [address] (oncoming nurse) by Gianni Renteria (offgoing nurse). Report included the following information SBAR, Kardex, MAR and Recent Results. Pt pending Dialysis assist with antibiotics dose due to poor access. Pt remains in stable condition. All safety and comfort measures in place.

## 2020-02-04 LAB
ANION GAP SERPL CALC-SCNC: 7 MMOL/L (ref 3–18)
BASOPHILS # BLD: 0 K/UL (ref 0–0.1)
BASOPHILS NFR BLD: 0 % (ref 0–2)
BUN SERPL-MCNC: 19 MG/DL (ref 7–18)
BUN/CREAT SERPL: 4 (ref 12–20)
CALCIUM SERPL-MCNC: 7.9 MG/DL (ref 8.5–10.1)
CHLORIDE SERPL-SCNC: 104 MMOL/L (ref 100–111)
CO2 SERPL-SCNC: 26 MMOL/L (ref 21–32)
CREAT SERPL-MCNC: 5.03 MG/DL (ref 0.6–1.3)
DIFFERENTIAL METHOD BLD: ABNORMAL
EOSINOPHIL # BLD: 0.5 K/UL (ref 0–0.4)
EOSINOPHIL NFR BLD: 4 % (ref 0–5)
ERYTHROCYTE [DISTWIDTH] IN BLOOD BY AUTOMATED COUNT: 19.4 % (ref 11.6–14.5)
GLUCOSE BLD STRIP.AUTO-MCNC: 133 MG/DL (ref 70–110)
GLUCOSE BLD STRIP.AUTO-MCNC: 71 MG/DL (ref 70–110)
GLUCOSE BLD STRIP.AUTO-MCNC: 83 MG/DL (ref 70–110)
GLUCOSE BLD STRIP.AUTO-MCNC: 89 MG/DL (ref 70–110)
GLUCOSE BLD STRIP.AUTO-MCNC: 90 MG/DL (ref 70–110)
GLUCOSE SERPL-MCNC: 84 MG/DL (ref 74–99)
HCT VFR BLD AUTO: 23.4 % (ref 36–48)
HGB BLD-MCNC: 7.5 G/DL (ref 13–16)
LYMPHOCYTES # BLD: 1.3 K/UL (ref 0.9–3.6)
LYMPHOCYTES NFR BLD: 9 % (ref 21–52)
MAGNESIUM SERPL-MCNC: 1.9 MG/DL (ref 1.6–2.6)
MCH RBC QN AUTO: 24.2 PG (ref 24–34)
MCHC RBC AUTO-ENTMCNC: 32.1 G/DL (ref 31–37)
MCV RBC AUTO: 75.5 FL (ref 74–97)
MONOCYTES # BLD: 1 K/UL (ref 0.05–1.2)
MONOCYTES NFR BLD: 7 % (ref 3–10)
NEUTS SEG # BLD: 11.3 K/UL (ref 1.8–8)
NEUTS SEG NFR BLD: 80 % (ref 40–73)
PHOSPHATE SERPL-MCNC: 2.2 MG/DL (ref 2.5–4.9)
PLATELET # BLD AUTO: 200 K/UL (ref 135–420)
PMV BLD AUTO: 9.4 FL (ref 9.2–11.8)
POTASSIUM SERPL-SCNC: 3.5 MMOL/L (ref 3.5–5.5)
RBC # BLD AUTO: 3.1 M/UL (ref 4.7–5.5)
SODIUM SERPL-SCNC: 137 MMOL/L (ref 136–145)
WBC # BLD AUTO: 14.1 K/UL (ref 4.6–13.2)

## 2020-02-04 PROCEDURE — 74011250636 HC RX REV CODE- 250/636: Performed by: HOSPITALIST

## 2020-02-04 PROCEDURE — 36415 COLL VENOUS BLD VENIPUNCTURE: CPT

## 2020-02-04 PROCEDURE — 74011250637 HC RX REV CODE- 250/637: Performed by: PODIATRIST

## 2020-02-04 PROCEDURE — 97530 THERAPEUTIC ACTIVITIES: CPT

## 2020-02-04 PROCEDURE — 80048 BASIC METABOLIC PNL TOTAL CA: CPT

## 2020-02-04 PROCEDURE — 74011250637 HC RX REV CODE- 250/637: Performed by: HOSPITALIST

## 2020-02-04 PROCEDURE — 85025 COMPLETE CBC W/AUTO DIFF WBC: CPT

## 2020-02-04 PROCEDURE — 82962 GLUCOSE BLOOD TEST: CPT

## 2020-02-04 PROCEDURE — 84100 ASSAY OF PHOSPHORUS: CPT

## 2020-02-04 PROCEDURE — 65270000029 HC RM PRIVATE

## 2020-02-04 PROCEDURE — 74011000250 HC RX REV CODE- 250: Performed by: HOSPITALIST

## 2020-02-04 PROCEDURE — 83735 ASSAY OF MAGNESIUM: CPT

## 2020-02-04 RX ORDER — PANTOPRAZOLE SODIUM 40 MG/1
40 TABLET, DELAYED RELEASE ORAL
Status: DISCONTINUED | OUTPATIENT
Start: 2020-02-05 | End: 2020-02-05 | Stop reason: HOSPADM

## 2020-02-04 RX ADMIN — ACETAMINOPHEN 650 MG: 325 TABLET, FILM COATED ORAL at 06:31

## 2020-02-04 RX ADMIN — HYDRALAZINE HYDROCHLORIDE 25 MG: 25 TABLET, FILM COATED ORAL at 21:31

## 2020-02-04 RX ADMIN — LIDOCAINE HYDROCHLORIDE 1 G: 10 INJECTION, SOLUTION EPIDURAL; INFILTRATION; INTRACAUDAL; PERINEURAL at 18:43

## 2020-02-04 RX ADMIN — ACETAMINOPHEN 650 MG: 325 TABLET, FILM COATED ORAL at 23:54

## 2020-02-04 RX ADMIN — HEPARIN SODIUM 5000 UNITS: 5000 INJECTION INTRAVENOUS; SUBCUTANEOUS at 23:55

## 2020-02-04 RX ADMIN — ACETAMINOPHEN 650 MG: 325 TABLET, FILM COATED ORAL at 13:04

## 2020-02-04 RX ADMIN — ACETAMINOPHEN 650 MG: 325 TABLET, FILM COATED ORAL at 18:02

## 2020-02-04 RX ADMIN — HEPARIN SODIUM 5000 UNITS: 5000 INJECTION INTRAVENOUS; SUBCUTANEOUS at 18:01

## 2020-02-04 RX ADMIN — ACETAMINOPHEN 650 MG: 325 TABLET, FILM COATED ORAL at 00:33

## 2020-02-04 RX ADMIN — HEPARIN SODIUM 5000 UNITS: 5000 INJECTION INTRAVENOUS; SUBCUTANEOUS at 09:35

## 2020-02-04 RX ADMIN — ISOSORBIDE MONONITRATE 60 MG: 60 TABLET, EXTENDED RELEASE ORAL at 09:35

## 2020-02-04 RX ADMIN — Medication 10 ML: at 06:00

## 2020-02-04 RX ADMIN — HYDRALAZINE HYDROCHLORIDE 25 MG: 25 TABLET, FILM COATED ORAL at 18:02

## 2020-02-04 RX ADMIN — CARVEDILOL 25 MG: 12.5 TABLET, FILM COATED ORAL at 18:01

## 2020-02-04 RX ADMIN — CARVEDILOL 25 MG: 12.5 TABLET, FILM COATED ORAL at 09:35

## 2020-02-04 RX ADMIN — HEPARIN SODIUM 5000 UNITS: 5000 INJECTION INTRAVENOUS; SUBCUTANEOUS at 00:07

## 2020-02-04 NOTE — PROGRESS NOTES
Assessment:     ESRD  DM  HTN  Anemia of CKD  Foot ulcer     Plan:    HD  MWF  Left foot ulcer per primary team  Ensure clear           CC: ESRD, has pain,   Interval History: No change since yesterday     Subjective:   No change since i saw pt last. No new symptoms or issues.         Review of Systems:  Negative for Edema, SOB    Blood pressure 168/68, pulse 98, temperature 98.9 °F (37.2 °C), resp. rate 24, height 5' 7\" (1.702 m), weight 86.6 kg (191 lb), SpO2 98 %.       awake and alert   NAD      Intake/Output Summary (Last 24 hours) at 2/4/2020 1126  Last data filed at 2/3/2020 2343  Gross per 24 hour   Intake --   Output 200 ml   Net -200 ml      Recent Labs     02/04/20  0453   WBC 14.1*     Lab Results   Component Value Date/Time    Sodium 137 02/04/2020 04:53 AM    Potassium 3.5 02/04/2020 04:53 AM    Chloride 104 02/04/2020 04:53 AM    CO2 26 02/04/2020 04:53 AM    Anion gap 7 02/04/2020 04:53 AM    Glucose 84 02/04/2020 04:53 AM    BUN 19 (H) 02/04/2020 04:53 AM    Creatinine 5.03 (H) 02/04/2020 04:53 AM    BUN/Creatinine ratio 4 (L) 02/04/2020 04:53 AM    GFR est AA 15 (L) 02/04/2020 04:53 AM    GFR est non-AA 12 (L) 02/04/2020 04:53 AM    Calcium 7.9 (L) 02/04/2020 04:53 AM        Current Facility-Administered Medications   Medication Dose Route Frequency Provider Last Rate Last Dose    cefTRIAXone (ROCEPHIN) 1 g in lidocaine (PF) (XYLOCAINE) 10 mg/mL (1 %) IM syringe  1 g IntraMUSCular Q24H Frantz Shelton MD        dextrose 5% infusion  25 mL/hr IntraVENous CONTINUOUS Frantz Shelton MD        epoetin delonte-epbx (RETACRIT) injection 9,000 Units  9,000 Units SubCUTAneous DIALYSIS MON, WED & FRI Yanet Landry R, DO   9,000 Units at 02/03/20 2146    [START ON 2/7/2020] ceFAZolin (ANCEF) 3 g/20 mL in sterile water IV syringe  3 g IntraVENous every Friday Orly Stevenson MD        ceFAZolin (ANCEF) 2g IVPB in 50 mL D5W  2 g IntraVENous every Monday Orly Stevenson MD        [START ON 2/5/2020] ceFAZolin (ANCEF) 2g IVPB in 50 mL D5W  2 g IntraVENous every Wednesday Kenya Field MD        albumin human 25% (BUMINATE) solution 25 g  25 g IntraVENous DIALYSIS PRN Yanet Landry R, DO   25 g at 01/31/20 1735    glucose chewable tablet 16 g  4 Tab Oral PRN Gregory Arambula MD        glucagon (GLUCAGEN) injection 1 mg  1 mg IntraMUSCular PRN Gregory Arambula MD        dextrose 10% infusion 125-250 mL  125-250 mL IntraVENous PRN Gregory Arambula  mL/hr at 02/02/20 0755 250 mL at 02/02/20 0755    promethazine (PHENERGAN) 25 mg in 0.9% sodium chloride 50 mL IVPB  25 mg IntraVENous Q4H PRN Gregory Arambula MD        morphine 10 mg/ml injection 4 mg  4 mg IntraVENous Q3H PRN Gregory Arambula MD   4 mg at 01/30/20 1916    sodium chloride (NS) flush 5-40 mL  5-40 mL IntraVENous Q8H MaSam crooks, DPM   10 mL at 02/04/20 0600    sodium chloride (NS) flush 5-40 mL  5-40 mL IntraVENous PRN Jen Carpentert, DPM        acetaminophen (TYLENOL) tablet 650 mg  650 mg Oral Q6H MaJen crookst, DPM   650 mg at 02/04/20 0631    HYDROmorphone (DILAUDID) tablet 2 mg  2 mg Oral Q4H PRN Jen Carpentert, DPM        HYDROmorphone (PF) (DILAUDID) injection 1 mg  1 mg IntraVENous Q4H PRN Jen Carpentert, DPM        naloxone (NARCAN) injection 0.4 mg  0.4 mg IntraVENous PRN Sarah Carpenter, DPM        influenza vaccine 2019-20 (6 mos+)(PF) (FLUARIX/FLULAVAL/FLUZONE QUAD) injection 0.5 mL  0.5 mL IntraMUSCular PRIOR TO DISCHARGE Marilin Deng MD        acetaminophen (TYLENOL) tablet 650 mg  650 mg Oral Q4H PRN Marilin Deng MD   650 mg at 01/29/20 2017    naloxone [facility]) injection 0.4 mg  0.4 mg IntraVENous PRN Marilin Deng MD        diphenhydrAMINE (BENADRYL) injection 12.5 mg  12.5 mg IntraVENous Q4H PRN Marilin Deng MD        [facility]ron [facility]) injection 4 mg  4 mg IntraVENous Q4H PRN Marilin Deng MD   4 mg at 01/30/20 0150    heparin (porcine) injection 5,000 Units  5,000 Units SubCUTAneous Q8H Marilin Deng MD   5,000 Units at 02/04/20 0935    hydrALAZINE (APRESOLINE) 20 mg/mL injection 10 mg  10 mg IntraVENous Q6H PRN Bibi Hui MD        carvediloL (COREG) tablet 25 mg  25 mg Oral BID WITH MEALS Bibi Hui MD   25 mg at 02/04/20 0935    isosorbide mononitrate ER (IMDUR) tablet 60 mg  60 mg Oral DAILY Bibi Hui MD   60 mg at 02/04/20 0935    [Held by provider] hydrALAZINE (APRESOLINE) tablet 25 mg  25 mg Oral TID Bibi Hui MD   Stopped at 01/29/20 2200    pantoprazole (PROTONIX) 40 mg in 0.9% sodium chloride 10 mL injection  40 mg IntraVENous DAILY Bibi Hui MD   40 mg at 02/02/20 1058

## 2020-02-04 NOTE — ROUTINE PROCESS
2007 Bedside and Verbal shift change report given to Rehan Ruano RN (oncoming nurse) by An Moffett RN (offgoing nurse). Report included the following information SBAR, Kardex, Intake/Output, MAR and Recent Results. 2204 Pt populating for possible sepsis R/T elevated pulse and WBC count of 22.0, Pt currently receiving Rocephin IM @1807 R/T no IV access. PT has been stuck several times and is refusing another stick. Dr Nirmala Noe notified via An Moffett RN and stated she wanted an EJ placed, awaiting placement via medic. On call hospitalist Dr Joana Tay notified order to increase PO fluids and MD to follow up.    0700 Pt resting in bed with no C/O pain or distress, reminded pt on the importance of oral fluids. WBC count  currently at 14.1     0732 Bedside and Verbal shift change report given to An Moffett RN (oncoming nurse) by Rehan Ruano RN (offgoing nurse). Report included the following information SBAR, Kardex, Intake/Output, MAR and Recent Results.

## 2020-02-04 NOTE — PROGRESS NOTES
Problem: Falls - Risk of  Goal: *Absence of Falls  Description  Document Thoams Avila Fall Risk and appropriate interventions in the flowsheet.   Outcome: Progressing Towards Goal  Note: Fall Risk Interventions:  Mobility Interventions: Communicate number of staff needed for ambulation/transfer, Assess mobility with egress test, Patient to call before getting OOB, PT Consult for mobility concerns, PT Consult for assist device competence, Strengthening exercises (ROM-active/passive), Utilize walker, cane, or other assistive device         Medication Interventions: Evaluate medications/consider consulting pharmacy, Patient to call before getting OOB, Teach patient to arise slowly    Elimination Interventions: Call light in reach, Patient to call for help with toileting needs, Urinal in reach              Problem: Patient Education: Go to Patient Education Activity  Goal: Patient/Family Education  Outcome: Progressing Towards Goal

## 2020-02-04 NOTE — PROGRESS NOTES
Clinical Pharmacy Note: IV to PO Automatic Conversion    Patient Name: Nadya Candelario. YOB: 1970   Medical Record Number: [id number]   Date of Admission: 1/29/2020    Daily Progress Note: 2/4/2020 3:30 PM     Please note the following medication(s) (Pantoprazole) has/have been changed from IV to PO based on the following critiera:    Patient is taking scheduled oral medications  Patient is tolerating tube feeds at goal rate or an NPO (except for meds), full liquid, soft or regular diet      Current Diet Orders  Active Orders   Diet    DIET RENAL Regular        New Order:  Protonix 40 mg PO ACB    This IV to PO conversion is based on the [facility] P&[employer] approved automatic conversion policy for eligible patients. Please call with questions.     MAGAN Burris  Clinical Pharmacist  [phone]

## 2020-02-04 NOTE — PROGRESS NOTES
Bedside and Verbal shift change report given to Cam Zheng RN (oncoming nurse) by Sinclair Schaumann, RN (offgoing nurse). Report included the following information SBAR, Kardex, Procedure Summary, Intake/Output, MAR and Recent Results.

## 2020-02-04 NOTE — PROGRESS NOTES
D/C Plan: [facility] [place] and physician follow up 2/5/2020    CM and provider met with pt at bedside to discuss transition of care. Pt continues to refuse rehab. Pt prefers to transition home with [facility]. Provider has indicated pt will have IVABX after dialysis. Pt has an order for a wheel chair and a sliding board to be facilitated by [facility]. Anticipate will transition home with in the next 24-48 hours with [facility]. CM to continue to follow and assist.    Care Management Interventions  PCP Verified by CM: Yes(ASKING FOR NEW PCP)  Mode of Transport at Discharge:  Other (see comment)(medicaid transport)  Transition of Care Consult (CM Consult): Home Health, Discharge [facility]: Yes  Health Maintenance Reviewed: Yes  Physical Therapy Consult: Yes  Current Support Network: Lives Alone, Family Lives Nearby  Confirm Follow Up Transport: Other (see comment)(medicaid)  The Plan for Transition of Care is Related to the Following Treatment Goals : HOME WITH MD FOLLOW UP, Steve Deluca  The Patient and/or Patient Representative was Provided with a Choice of Provider and Agrees with the Discharge Plan?: Yes  Name of the Patient Representative Who was Provided with a Choice of Provider and Agrees with the Discharge Plan: PATIENT  Freedom of Choice List was Provided with Basic Dialogue that Supports the Patient's Individualized Plan of Care/Goals, Treatment Preferences and Shares the Quality Data Associated with the Providers?: Yes  Discharge Location  Discharge Placement: Home with home health

## 2020-02-04 NOTE — PROGRESS NOTES
NUTRITION UPDATE    - Supplement: d/c semaj Carranza [facility] [address], Claudia  PAGER:  [phone]

## 2020-02-04 NOTE — PROGRESS NOTES
Hospitalist Progress Note-critical care note     Patient: Nadya Avitia MRN: [id number]  CSN: [id number]    YOB: 1970  Age: 54 y.o. Sex: male    DOA: 1/29/2020 LOS:  LOS: 6 days            Chief complaint: hypoglycemia, esrd on HD, cellulitis and abscess of foot.  HTN     Assessment/Plan         Hospital Problems  Date Reviewed: 1/30/2020          Codes Class Noted POA    Hypoglycemia ICD-10-CM: E16.2  ICD-9-CM: 251.2  1/30/2020 Unknown        Dehydration ICD-10-CM: E86.0  ICD-9-CM: 276.51  1/29/2020 Yes        ESRD on hemodialysis [facility]) ICD-10-CM: N18.6, Z99.2  ICD-9-CM: 585.6, V45.11  1/29/2020 Yes        * (Principal) Cellulitis and abscess of foot ICD-10-CM: L03.119, L02.619  ICD-9-CM: 682.7  1/29/2020 Yes        HTN (hypertension) ICD-10-CM: I10  ICD-9-CM: 401.9  1/29/2020 Yes        Acute osteomyelitis ([facility] [address] ICD-10-CM: M86.10  ICD-9-CM: 730.00  1/29/2020 Yes        Hx of BKA, right ([facility] [address] ICD-10-CM: Z89.511  ICD-9-CM: V49.75  1/29/2020 Yes        Esophagitis ICD-10-CM: K20.9  ICD-9-CM: 530.10  1/29/2020 Unknown        Anemia of chronic disease ICD-10-CM: D63.8  ICD-9-CM: 285.29  2/28/2018 Yes            Sepsis -on admission due to mssa and CITROBACTER KOSERI wound -CDI     Cellulitis and abscess of the foot, acute osteomyelitis  INCISION AND DRAINAGE, WITH AGGRESSIVE DEBRIDEMENT OF CALCANEOUS ,BIOPSY OF LEFT FOOT WITH C-ARM per Dr. Sosa Hammond -need second surgery if respond to current treatment   Dr. Benedicto Norman  On board , need 6 weeks iv abx, case discussed with dr. Jess Farley, he is ok to give abx after hd-appreciated for helping pt   CITROBACTER KOSERI, STAPHYLOCOCCUS SPECIES    have to give  Rocephine IM still unable to get iv access     Dehydration  Resolved      End-stage renal disease on HD  Hd tomorrow   Scheduled for HD Tuesday Thursday and Saturday as out-pt   HD per renal      htn   Continue home medication, coreg and imdur  And hydralyzine      Right BKA  Fall precaution.     Chronic anemia due to end-stage renal disease  Mild drop due to chronic renal disease, on epoetin      Esophagitis   ppi      Hypoglycemia   Resolved -    Subjective: my skin peeling,     RN: no iv access   Encourage pt to work with pt/ot and recover soon, he does not want to go to rehab,would like to go home, need PT clear to be d.c home   D/c 1-2 days   Review of systems:    General: No fevers or chills. Tired   Cardiovascular: No chest pain or pressure. No palpitations. Pulmonary: No shortness of breath. Gastrointestinal: No nausea, vomiting. Vital signs/Intake and Output:  Visit Vitals  /72 (BP 1 Location: Right arm, BP Patient Position: At rest)   Pulse (!) 101   Temp 99 °F (37.2 °C)   Resp 22   Ht 5' 7\" (1.702 m)   Wt 86.2 kg (190 lb)   SpO2 97%   BMI 29.76 kg/m²     Current Shift:  No intake/output data recorded. Last three shifts:  02/02 1901 - 02/04 0700  In: -   Out: 300 [Urine:300]    Physical Exam:  General: WD, WN. Alert, cooperative, no acute distress    HEENT: NC, Atraumatic. PERRLA, anicteric sclerae. Lungs: CTA Bilaterally. No Wheezing/Rhonchi/Rales. Heart:  Regular  rhythm,  No murmur, No Rubs, No Gallops  Abdomen: Soft, Non distended, Non tender.  +Bowel sounds,   Extremities: No c/c. Rt bka, Left foot wrapped with ace bandage   Psych:   Not anxious or agitated. Neurologic:  No acute neurological deficit.              Labs: Results:       Chemistry Recent Labs     02/04/20  0453 02/03/20  0439 02/02/20  0408   GLU 84 41*  --     137  --    K 3.5 4.0  --     104  --    CO2 26 26  --    BUN 19* 30*  --    CREA 5.03* 7.64*  --    CA 7.9* 7.9*  --    AGAP 7 7  --    BUCR 4* 4*  --    AP  --   --  145*   TP  --   --  6.5   ALB  --   --  2.0*   GLOB  --   --  4.5*   AGRAT  --   --  0.4*      CBC w/Diff Recent Labs     02/04/20  0453 02/03/20  0439   WBC 14.1* 22.0*   RBC 3.10* 3.29*   HGB 7.5* 8.0*   HCT 23.4* 25.1*    207   GRANS 80* 89*   LYMPH 9* 4*   EOS 4 4 Cardiac Enzymes No results for input(s): CPK, CKND1, WILLIE in the last 72 hours. No lab exists for component: CKRMB, TROIP   Coagulation No results for input(s): PTP, INR, APTT, INREXT, INREXT in the last 72 hours. Lipid Panel No results found for: CHOL, CHOLPOCT, CHOLX, CHLST, CHOLV, 497265, HDL, HDLP, LDL, LDLC, DLDLP, 048446, VLDLC, VLDL, TGLX, TRIGL, TRIGP, TGLPOCT, CHHD, CHHDX   BNP No results for input(s): BNPP in the last 72 hours.    Liver Enzymes Recent Labs     02/02/20  0408   TP 6.5   ALB 2.0*   *   SGOT 25      Thyroid Studies No results found for: T4, T3U, TSH, TSHEXT, TSHEXT     Procedures/imaging: see electronic medical records for all procedures/Xrays and details which were not copied into this note but were reviewed prior to creation of Jennie Valdez MD

## 2020-02-04 NOTE — PROGRESS NOTES
1645 Pt received from offgoing nurse without any signs or symptoms of distress. Pt vitals are stable and within normal limits. Pt bed in low position with wheels locked and call bell within reach. 1705 Assessment completed and documented in flow sheet. Pt denies any further needs at this time. Pt in NAD with bed in low position, wheels locked and call bell within reach. 1802 Scheduled medications administered as ordered. 2131 Scheduled medications administered as ordered. Reassessment completed with no changes noted. Bed locked, in lowest position, with call light within reach. 2255 Bedside and Verbal shift change report given to Blas Lopez (oncoming nurse) by Bruce Koroma RN   (offgoing nurse). Report included the following information SBAR, Procedure Summary, Intake/Output and MAR.

## 2020-02-05 VITALS
HEART RATE: 102 BPM | HEIGHT: 67 IN | RESPIRATION RATE: 20 BRPM | TEMPERATURE: 98.8 F | SYSTOLIC BLOOD PRESSURE: 183 MMHG | OXYGEN SATURATION: 101 % | DIASTOLIC BLOOD PRESSURE: 75 MMHG | BODY MASS INDEX: 29.82 KG/M2 | WEIGHT: 190 LBS

## 2020-02-05 LAB
ANION GAP SERPL CALC-SCNC: 5 MMOL/L (ref 3–18)
BASOPHILS # BLD: 0 K/UL (ref 0–0.1)
BASOPHILS NFR BLD: 0 % (ref 0–2)
BUN SERPL-MCNC: 29 MG/DL (ref 7–18)
BUN/CREAT SERPL: 4 (ref 12–20)
CALCIUM SERPL-MCNC: 7.8 MG/DL (ref 8.5–10.1)
CHLORIDE SERPL-SCNC: 105 MMOL/L (ref 100–111)
CO2 SERPL-SCNC: 26 MMOL/L (ref 21–32)
CREAT SERPL-MCNC: 6.57 MG/DL (ref 0.6–1.3)
DIFFERENTIAL METHOD BLD: ABNORMAL
EOSINOPHIL # BLD: 0.7 K/UL (ref 0–0.4)
EOSINOPHIL NFR BLD: 5 % (ref 0–5)
ERYTHROCYTE [DISTWIDTH] IN BLOOD BY AUTOMATED COUNT: 19.4 % (ref 11.6–14.5)
GLUCOSE BLD STRIP.AUTO-MCNC: 66 MG/DL (ref 70–110)
GLUCOSE BLD STRIP.AUTO-MCNC: 69 MG/DL (ref 70–110)
GLUCOSE BLD STRIP.AUTO-MCNC: 80 MG/DL (ref 70–110)
GLUCOSE BLD STRIP.AUTO-MCNC: 81 MG/DL (ref 70–110)
GLUCOSE BLD STRIP.AUTO-MCNC: 93 MG/DL (ref 70–110)
GLUCOSE BLD STRIP.AUTO-MCNC: 98 MG/DL (ref 70–110)
GLUCOSE SERPL-MCNC: 105 MG/DL (ref 74–99)
HCT VFR BLD AUTO: 22.7 % (ref 36–48)
HGB BLD-MCNC: 7.3 G/DL (ref 13–16)
LYMPHOCYTES # BLD: 0.7 K/UL (ref 0.9–3.6)
LYMPHOCYTES NFR BLD: 5 % (ref 21–52)
MAGNESIUM SERPL-MCNC: 2.1 MG/DL (ref 1.6–2.6)
MCH RBC QN AUTO: 24 PG (ref 24–34)
MCHC RBC AUTO-ENTMCNC: 32.2 G/DL (ref 31–37)
MCV RBC AUTO: 74.7 FL (ref 74–97)
MONOCYTES # BLD: 1.9 K/UL (ref 0.05–1.2)
MONOCYTES NFR BLD: 14 % (ref 3–10)
NEUTS SEG # BLD: 10.4 K/UL (ref 1.8–8)
NEUTS SEG NFR BLD: 76 % (ref 40–73)
PHOSPHATE SERPL-MCNC: 2.2 MG/DL (ref 2.5–4.9)
PLATELET # BLD AUTO: 200 K/UL (ref 135–420)
PMV BLD AUTO: 9 FL (ref 9.2–11.8)
POTASSIUM SERPL-SCNC: 3.7 MMOL/L (ref 3.5–5.5)
RBC # BLD AUTO: 3.04 M/UL (ref 4.7–5.5)
RBC MORPH BLD: ABNORMAL
SODIUM SERPL-SCNC: 136 MMOL/L (ref 136–145)
WBC # BLD AUTO: 13.7 K/UL (ref 4.6–13.2)
WBC MORPH BLD: ABNORMAL

## 2020-02-05 PROCEDURE — 74011250637 HC RX REV CODE- 250/637: Performed by: HOSPITALIST

## 2020-02-05 PROCEDURE — 74011250636 HC RX REV CODE- 250/636: Performed by: INTERNAL MEDICINE

## 2020-02-05 PROCEDURE — 74011250637 HC RX REV CODE- 250/637: Performed by: PODIATRIST

## 2020-02-05 PROCEDURE — 74011250636 HC RX REV CODE- 250/636: Performed by: HOSPITALIST

## 2020-02-05 PROCEDURE — 74011250637 HC RX REV CODE- 250/637: Performed by: FAMILY MEDICINE

## 2020-02-05 PROCEDURE — 36415 COLL VENOUS BLD VENIPUNCTURE: CPT

## 2020-02-05 PROCEDURE — 82962 GLUCOSE BLOOD TEST: CPT

## 2020-02-05 PROCEDURE — 80048 BASIC METABOLIC PNL TOTAL CA: CPT

## 2020-02-05 PROCEDURE — 83735 ASSAY OF MAGNESIUM: CPT

## 2020-02-05 PROCEDURE — 85025 COMPLETE CBC W/AUTO DIFF WBC: CPT

## 2020-02-05 PROCEDURE — P9047 ALBUMIN (HUMAN), 25%, 50ML: HCPCS | Performed by: INTERNAL MEDICINE

## 2020-02-05 PROCEDURE — 74011250636 HC RX REV CODE- 250/636: Performed by: FAMILY MEDICINE

## 2020-02-05 PROCEDURE — 84100 ASSAY OF PHOSPHORUS: CPT

## 2020-02-05 RX ORDER — OXYCODONE AND ACETAMINOPHEN 5; 325 MG/1; MG/1
1 TABLET ORAL
Qty: 12 TAB | Refills: 0 | Status: SHIPPED | OUTPATIENT
Start: 2020-02-05 | End: 2020-02-08

## 2020-02-05 RX ORDER — CEFAZOLIN SODIUM 2 G/50ML
2 SOLUTION INTRAVENOUS
Qty: 50 ML | Refills: 0 | Status: SHIPPED | OUTPATIENT
Start: 2020-02-05 | End: 2021-09-06

## 2020-02-05 RX ADMIN — PANTOPRAZOLE SODIUM 40 MG: 40 TABLET, DELAYED RELEASE ORAL at 12:56

## 2020-02-05 RX ADMIN — ISOSORBIDE MONONITRATE 60 MG: 60 TABLET, EXTENDED RELEASE ORAL at 12:56

## 2020-02-05 RX ADMIN — ALBUMIN (HUMAN) 25 G: 0.25 INJECTION, SOLUTION INTRAVENOUS at 10:45

## 2020-02-05 RX ADMIN — HEPARIN SODIUM 5000 UNITS: 5000 INJECTION INTRAVENOUS; SUBCUTANEOUS at 12:56

## 2020-02-05 RX ADMIN — ALBUMIN (HUMAN) 25 G: 0.25 INJECTION, SOLUTION INTRAVENOUS at 11:45

## 2020-02-05 RX ADMIN — GLUCAGON HYDROCHLORIDE 1 MG: 1 INJECTION, POWDER, FOR SOLUTION INTRAMUSCULAR; INTRAVENOUS; SUBCUTANEOUS at 12:11

## 2020-02-05 RX ADMIN — CEFAZOLIN SODIUM 2 G: 2 SOLUTION INTRAVENOUS at 12:54

## 2020-02-05 RX ADMIN — HYDRALAZINE HYDROCHLORIDE 25 MG: 25 TABLET, FILM COATED ORAL at 12:56

## 2020-02-05 RX ADMIN — ACETAMINOPHEN 650 MG: 325 TABLET, FILM COATED ORAL at 13:57

## 2020-02-05 RX ADMIN — ALBUMIN (HUMAN) 25 G: 0.25 INJECTION, SOLUTION INTRAVENOUS at 12:54

## 2020-02-05 RX ADMIN — ONDANSETRON 4 MG: 2 INJECTION INTRAMUSCULAR; INTRAVENOUS at 13:56

## 2020-02-05 RX ADMIN — CARVEDILOL 25 MG: 12.5 TABLET, FILM COATED ORAL at 12:56

## 2020-02-05 NOTE — PROGRESS NOTES
1029 Bedside and verbal shift change report given to Leah Conklin RN (on coming nurse) by Дмитрий Keane RN (off going nurse). Report included the following information SBAR, Kardex, OR Summary, Intake/Output and MAR.    7647 Dyalysis Nurse in with Pt. Morning medication held. Albumen human 25% solution 25 g X 3 to be given one each hour. [address] about Ancef needing to be schedule for noon instead of 1700 because pt dialysis will end at noon today. [address] report BG 69.  aminister orange juice. Will recheck    1130 recheck BG 80    1145 recheck BG 66    1214 Administer glugaon    7341 recheck 28    1419 AVS reviewed with Ra Holden RN    3962  AVS review with pt, opportunity for questions and concerns at this time. D/c IV clean and dry. Properly discarded armbands.

## 2020-02-05 NOTE — PROGRESS NOTES
Problem: Mobility Impaired (Adult and Pediatric)  Goal: *Acute Goals and Plan of Care (Insert Text)  Description  Physical Therapy Goals   Initiated 2/1/2020 and to be accomplished within 5-7 day(s)  1. Patient will move from supine <> sit with Mod I-S  in prep for out of bed activity and change of position. 2.  Patient will transfer from bed <> chair with Mod I-S with sliding board for time up in chair for completion of ADL activity. Outcome: Progressing Towards Goal     PHYSICAL THERAPY TREATMENT    Patient: Aashish Feldman (57 y.o. male)  Date: 2/4/2020  Diagnosis: Dehydration [E86.0]   Cellulitis and abscess of foot  Procedure(s) (LRB):  INCISION AND DRAINAGE, WITH DEBRIDEMENT ,BIOPSY OF LEFT FOOT WITH C-ARM (Left) 5 Days Post-Op  Precautions: Fall, NWB   Chart, physical therapy assessment, plan of care and goals were reviewed. ASSESSMENT:  Pt is eager to demonstrate slide board transfer for potential home D/c. Pt performs bed to chair no physical assist. Able to sit in W/c for 40 min before assist back to bed. Pt agreed to RW use, which is limited by inability to hop or make lateral moves with R (prosthetic LE). Pt can safely and repeatedly perform sliding board, but C/o  increased discomfort from full body skin reaction. Progression toward goals:  [x]      Improving appropriately and progressing toward goals  []      Improving slowly and progressing toward goals  []      Not making progress toward goals and plan of care will be adjusted     PLAN:  Patient continues to benefit from skilled intervention to address the above impairments. Continue treatment per established plan of care. Discharge Recommendations:  Home Health  Further Equipment Recommendations for Discharge:  wheelchair  and Sliding board      SUBJECTIVE:   Patient stated I can do it my way.     OBJECTIVE DATA SUMMARY:   Critical Behavior:  Neurologic State: Alert, Drowsy  Orientation Level: Oriented X4  Functional Mobility Training:  Bed Mobility:  Rolling: Supervision; Additional time  Supine to Sit: Supervision; Additional time  Sit to Supine: Supervision; Additional time  Scooting: Supervision; Additional time  Transfers:  Sit to Stand: Contact guard assistance;Minimum assistance  Stand to Sit: Contact guard assistance;Minimum assistance  Bed to Chair: Minimum assistance  Sliding Board : Stand-by assistance  Balance:  Sitting: Intact  Standing: Impaired  Standing - Static: Good  Standing - Dynamic : Fair  Ambulation/Gait Training:  Not able to progress to SL ambulation on prosthetic Limb  Pain:  Pain Scale 1: Numeric (0 - 10)  Pain Intensity 1: 0  Pain out: 1   Activity Tolerance:   Fair  Please refer to the flowsheet for vital signs taken during this treatment.   After treatment:   [] Patient left in no apparent distress sitting up in chair  [x] Patient left in no apparent distress in bed  [x] Call bell left within reach  [x] Nursing notified  [] Caregiver present  [] Bed alarm activated      Neel Puentes PTA   Time Calculation: 26 mins

## 2020-02-05 NOTE — DISCHARGE SUMMARY
Discharge Summary    Patient: Eva Frazier MRN: [id number]  CSN: [id number]    YOB: 1970  Age: 54 y.o. Sex: male    DOA: 1/29/2020 LOS:  LOS: 7 days   Discharge Date:      Primary Care Provider:  Markos Sim MD    Admission Diagnoses: Dehydration [E86.0]    Discharge Diagnoses:    Hospital Problems  Date Reviewed: 1/30/2020          Codes Class Noted POA    Hypoglycemia ICD-10-CM: E16.2  ICD-9-CM: 251.2  1/30/2020 Unknown        Dehydration ICD-10-CM: E86.0  ICD-9-CM: 276.51  1/29/2020 Yes        ESRD on hemodialysis [facility]) ICD-10-CM: N18.6, Z99.2  ICD-9-CM: 585.6, V45.11  1/29/2020 Yes        * (Principal) Cellulitis and abscess of foot ICD-10-CM: L03.119, L02.619  ICD-9-CM: 682.7  1/29/2020 Yes        HTN (hypertension) ICD-10-CM: I10  ICD-9-CM: 401.9  1/29/2020 Yes        Ac[facility] osteomyel[facility] ([facility] ICD-10-CM: M86.10  ICD-9-CM: 730.00  1/29/2020 Yes        Hx of BKA, right ([facility] ICD-10-CM: Z89.511  ICD-9-CM: V49.75  1/29/2020 Yes        Esophagitis ICD-10-CM: K20.9  ICD-9-CM: 530.10  1/29/2020 Unknown        Anemia of chronic disease ICD-10-CM: D63.8  ICD-9-CM: 285.29  2/28/2018 Yes              Discharge Condition: stable     Discharge Medications:     Current Discharge Medication List      START taking these medications    Details   cefazolin sodium/dextrose,iso (CEFAZOLIN IN DEXTROSE, ISO-OS,) 2 gram/50 mL pgbk 50 mL by IntraVENous route every Monday. Mwf, 2-2-3 g after HD  Qty: 50 mL, Refills: 0      oxyCODONE-acetaminophen (PERCOCET) 5-325 mg per tablet Take 1 Tab by mouth every six (6) hours as needed for Pain for up to 3 days. Max Daily Amount: 4 Tabs. Qty: 12 Tab, Refills: 0    Associated Diagnoses: Subacute osteomyelitis of left ankle ([address]         CONTINUE these medications which have NOT CHANGED    Details   amLODIPine (NORVASC) 10 mg tablet Take 1 Tab by mouth daily.   Qty: 30 Tab, Refills: 0      carvedilol (COREG) 25 mg tablet Take 1 Tab by mouth two (2) times daily (with meals). Qty: 60 Tab, Refills: 0      hydrALAZINE (APRESOLINE) 25 mg tablet Take 1 Tab by mouth three (3) times daily. Qty: 90 Tab, Refills: 0      isosorbide mononitrate ER (IMDUR) 60 mg CR tablet Take 1 Tab by mouth daily. Qty: 30 Tab, Refills: 0      furosemide (LASIX) 80 mg tablet Take 1 Tab by mouth two (2) times a day. Qty: 60 Tab, Refills: 0             Procedures :  debridment     Consults: Infectious Disease podiatry ,nephrologist       PHYSICAL EXAM  Visit Vitals  /60 (BP 1 Location: Right arm, BP Patient Position: At rest;Head of bed elevated (Comment degrees))   Pulse 94   Temp 99.4 °F (37.4 °C)   Resp 22   Ht 5' 7\" (1.702 m)   Wt 86.2 kg (190 lb)   SpO2 92%   BMI 29.76 kg/m²     General: Awake, cooperative, no acute distress    HEENT: NC, Atraumatic. PERRLA, EOMI. Anicteric sclerae. Lungs:  CTA Bilaterally. No Wheezing/Rhonchi/Rales. Heart:  Regular  rhythm,  No murmur, No Rubs, No Gallops  Abdomen: Soft, Non distended, Non tender. +Bowel sounds,   Extremities: No c/c bka -rt, left foot wrapped with ace bandage   Psych:   Not anxious or agitated. Neurologic:  No acute neurological deficits. Admission HPI :   Yovani Kruse. is a 54 y.o. male with PMHX of history of end-stage renal disease on HD, HTN , heart failure came to ER due to nausea /vomiting and abnormal pain since last Saturday. He did miss his dialyzed due to nausea or vomiting. CT abdomen indicated  possible esophagitis. He also noted he is  left ankle became swelling and \" something pop up with the swelling\" yesterday. He did not see any  physician for his ankle. He denies any fever chills. In ER his white count was  12 K, his K4.6. Creatinine 14.2. He was giving Levaquin /clindamycin and Zosyn per ER physician. Nephrology and podiatry has been called.     Denies any slurred speech/headache/cp/blurred vission/d/c/palpitation/gait change/bleeding.  Denies smoking/ any alcohol or drug use    Hospital Course :   ferny Quintero. is a 54 y.o. male with PMHX of history of end-stage renal disease on HD, HTN , heart failur was admitted due to foot cellulitis and abscess. IV hydration antibiotics was started on admission. Podiatry was on board for his osteomyelitis and abscess. His wound was debrided. Infection disease was on board recommended cefazolin for 6 weeks. He will follow-up with Dr. Manuel Christensen for secondary surgery after infection has been treated. Patient to follow-up with Dr. Micki Zuniga for hemodialysis. Before discharge, antibiotics discussed with Dr. Carlito Dupree, he will give patient cefzolin 2-2-3g after HD to complete 6 wk treatment. nephrology was on board during his stay for HD. He had hypoglycemia during his stay, resolved on discharge. He also received PT OT, he refused to go to rehab. All the equipment he needs for home will be  provided. He got clearance from a physical therapy to be discharged home. Activity: Activity as tolerated    Diet: Renal Diet    Follow-up: PCP and HD clinic and Dr. Asher Haas     Disposition: home     Minutes spent on discharge: 45 min       Labs: Results:       Chemistry Recent Labs     02/05/20  0605 02/04/20  0453 02/03/20  0439   * 84 41*    137 137   K 3.7 3.5 4.0    104 104   CO2 26 26 26   BUN 29* 19* 30*   CREA 6.57* 5.03* 7.64*   CA 7.8* 7.9* 7.9*   AGAP 5 7 7   BUCR 4* 4* 4*      CBC w/Diff Recent Labs     02/05/20  0605 02/04/20 0453 02/03/20  0439   WBC 13.7* 14.1* 22.0*   RBC 3.04* 3.10* 3.29*   HGB 7.3* 7.5* 8.0*   HCT 22.7* 23.4* 25.1*    200 207   GRANS 76* 80* 89*   LYMPH 5* 9* 4*   EOS 5 4 4      Cardiac Enzymes No results for input(s): CPK, CKND1, WILLIE in the last 72 hours. No lab exists for component: CKRMB, TROIP   Coagulation No results for input(s): PTP, INR, APTT, INREXT in the last 72 hours.     Lipid Panel No results found for: CHOL, CHOLPOCT, CHOLX, CHLST, CHOLV, 548759, HDL, HDLP, LDL, LDLC, DLDLP, 299007, VLDLC, VLDL, TGLX, TRIGL, TRIGP, TGLPOCT, CHHD, CHHDX   BNP No results for input(s): BNPP in the last 72 hours. Liver Enzymes No results for input(s): TP, ALB, TBIL, AP, SGOT, GPT in the last 72 hours. No lab exists for component: DBIL   Thyroid Studies No results found for: T4, T3U, TSH, TSHEXT         Significant Diagnostic Studies: Xr Foot Lt Min 3 V    Result Date: 1/29/2020  EXAM: LEFT FOOT RADIOGRAPHS CLINICAL INDICATION/HISTORY: pain -Additional: None COMPARISON: None TECHNIQUE: 3 views of the left foot _______________ FINDINGS: BONES: Osseous erosive changes at the posterior calcaneus at the level of the Achilles insertion. No evidence of acute fracture or dislocation. Prior second distal metatarsal and proximal phalangeal amputation. SOFT TISSUES: Soft tissue swelling about the calcaneus with ulceration along the plantar surface and subcutaneous gas at the level of the Achilles tendon. Vascular calcifications. _______________     IMPRESSION: Osseous erosive changes of the posterior calcaneus with overlying soft tissue swelling and skin ulceration compatible with osteomyelitis. Xr Calcaneus Lt    Result Date: 1/30/2020  EXAM: LEFT OS CALCIS INDICATION: Osteomyelitis. TECHNIQUE: 3 lateral fluoroscopic images obtained. _______________ FINDINGS: Initial image shows cortical irregularity of posterior calcaneus with soft tissue defect superiorly. Localization device overlies posterior left calcaneus. Final films show left posterior calcaneal osteotomy with soft tissue surgical defect. Fluoroscopy time:  8 seconds Radiation dose (reference air kerma): 4025 mGy _______________     IMPRESSION: Posterior left calcaneal osteotomy for osteomyelitis with expected postoperative changes in adjacent soft tissues.      Mri Ankle Lt Wo Cont    Result Date: 1/29/2020  MRI EXTREMITY ( INFECTION), ankle and hindfoot, left, without contrast History: Swelling signs of inflammation, with ulcer, possible infection and suspected abscess and/or osteomyelitis, for further evaluation MR technique: Sagittal and/or coronal T1 weighted spin echo, STIR fast spin echo, transverse/ axial T1 weighted spin echo, T2 weighted, STIR fast spin echo sequences precontrast . No contrast administered per referring physician request, potential limitation in evaluation of certain components of infection No prior MR. Comparison prior exam, plain films today . MR FINDINGS: There is a moderate-sized plantar and small posterior heel ulcer, both associated with sinus tracts, local induration of soft tissue with decreased T1 and intermediate to increased T2 subcutaneous fat obliteration. The plantar sinus tract extends into a C shaped 2 cm plantar heel complex fluid collection containing several tiny foci of signal void presumed gas  consistent with abscess. This tracks along the juxta periosteal plantar aspect of the calcaneus with secondary extension into and erosion of the plantar calcaneal cortex with extension posterosuperiorly along broad J shaped 4 cm region of the plantar and posterior tuberosity bone. This is associated with a large surrounding region of confluent decreased T1 and increased STIR signal marrow edema extending into the body and neck of the calcaneus. There is associated rupture of the lateral bundle of the plantar aponeurosis from the tuberosity origin. Perifascial extends to central bundle with there is fusiform low signal thickening of the aponeurosis but no discrete disruption from the calcaneus. Posterior sinus tract extends directly into the posterior distal Achilles tendon 3 cm above the insertion. This is associated with diffuse thickening, extensive punctate signal void, radiographically soft tissue gas within the tendon and the adjacent paratenon and retrocalcaneal fat. This is also associated with avulsion of the Achilles tendon with 1 cm retraction.  Heterotopic ossification is present radiographically, but no local marrow signal is evident locally, probably secondary to edema within the marrow Diffuse plantar muscle edema with mild atrophy is present. MISC: Unremarkable subtalar joint other than minimal joint effusion. Small os trigonum of normal marrow signal. Unremarkable tarsal sinus with intact extensor retinacula the, cervical and interosseous ligaments. Mild degenerative arthritis with marginal osteophyte formation, ankle joint without discrete effusion. No talocrural osteochondral defect. Unremarkable peroneal tendons. Top normal sheath fluid volume. Unremarkable extensor tendons. Unremarkable medial flexor tendons. [place] of sheath fluid at the level of the knot of Maria Ramirez, primarily related to the flexor hallucis longus tendon. Mild edema within the flexor hallucis longus and peroneus brevis muscles at the ankle level. Diffuse cellular edema posterior knee including around the posterior tibial neurovascular bundle. Intact major ankle ligaments including syndesmotic ligaments. .     IMPRESSION: 1. Noncontrast exam. Plantar and posterior heel ulcers with sinus tracts, local induration presumed granulation tissue. Local C shaped 2 cm plantar heel complex fluid collection consistent with abscess. Secondary disruption of the origin lateral bundle plantar aponeurosis superimposed on mild generalized degenerative tendinosis 2. Findings consistent with extensive calcaneal posterior/plantar tuberosity and body acute osteomyelitis. 3. Extension of posterior sinus tract gas into distal Achilles tendon. Additional insertional avulsion of the Achilles tendon from the posterior tuberosity with 1 cm retraction, diffuse intratendinous edema and probable superimposed degenerative tendinosis. Secondary moderate peritendinous edema extending broadly in the pre-Achilles fat. 4. Findings consistent with flexor hallucis longus tenosynovitis primarily at the knot of Paul.  Additional mild edema or atrophy of the flexor hallucis longus and peroneus brevis muscles. 5. Small posterior subtalar joint effusion but no definite subtalar or ankle involvement. Mild ankle degenerative joint disease. 6. Diffuse plantar muscle edema may reflect myositis and/or denervation atrophy 7. Other minor and/or ancillary findings as above. Ct Abd Pelv Wo Cont    Result Date: 1/29/2020  EXAM: CT of the abdomen and pelvis INDICATION: Left-sided abdominal pain. Diarrhea. COMPARISON: None. TECHNIQUE: Axial CT imaging of the abdomen and pelvis was performed without intravenous contrast. Multiplanar reformats were generated. One or more dose reduction techniques were used on this CT: automated exposure control, adjustment of the mAs and/or kVp according to patient size, and iterative reconstruction techniques. The specific techniques used on this CT exam have been documented in the patient's electronic medical record. Digital Imaging and Communications in the Medicine (DICOM) format image data are available to nonaffiliated external healthcare facilities or entities on a secure, media free, reciprocally searchable basis with patient authorization for at least a 12 month period after this study. _______________ FINDINGS: LOWER CHEST: Mild regions of basilar atelectasis. Thick-walled appearance of the distal thoracic esophagus. LIVER, BILIARY: Liver is normal. No biliary dilation. Subtle density level in the gallbladder suggests sludge. No calcified gallstones. [place] PANCREAS: Normal. SPLEEN: Normal. ADRENALS: Normal. KIDNEYS: No urinary tract stones or renal obstructive changes. LYMPH NODES: Nonspecific left inguinal lymphadenopathy up to 15 mm short axis. Milder left iliac chain lymphadenopathy, up to 12 mm short axis. GASTROINTESTINAL TRACT: No bowel obstruction or convincing bowel wall thickening. Portions of the colon are underdistended. Noninflamed colonic diverticula are present. No pericolonic inflammatory stranding. Unremarkable appendix.  PELVIC ORGANS: Underdistended urinary bladder. No free pelvic fluid. VASCULATURE: Unremarkable. BONES: No acute or aggressive osseous abnormalities identified. OTHER: None. _______________     IMPRESSION: 1. No acute pathology within the abdomen or pelvis. 2. Nonspecific left inguinal lymphadenopathy up to 15 mm short axis. Milder left iliac chain lymphadenopathy, up to 12 mm short axis. 3. Thick wall appearance of the distal thoracic esophagus, could reflect esophagitis. Xr Chest Port    Result Date: 1/29/2020  CLINICAL: Concern for aspiration, vomiting. COMPARISON: February 28, 2018. A portable view of the chest obtained upright: The lungs and pleural spaces are clear. The mediastinum is unremarkable in appearance. Old healed right posterior rib fracture. Impression: No acute process. Shakeel Tiwari Technologist Service    Result Date: 2/3/2020  See impression. Impression:  Fluoroscopy was provided for this procedure under the supervision and/or direction of the attending provider. ? For further information regarding this procedure, see patient medical record.              [facility]     CC: Jade Parra MD

## 2020-02-05 NOTE — PROGRESS NOTES
Pt refused PT due to:  [x]  \"They are sending me home. No thanks! \"  []  Eating  []  Pain  []  Pt lethargic  []  Off Unit  Will f/u later, as pt's schedule allows. Thank you.   Ania Kiser, PTA

## 2020-02-05 NOTE — PROGRESS NOTES
Assessment:     ESRD  DM  HTN  Anemia of CKD  Foot ulcer     Plan:    HD  MWF  Left foot ulcer per primary team  Ensure clear   ancef post HD today        CC: ESRD, has pain,   Interval History: No change since yesterday     Subjective:   No change since i saw pt last. No new symptoms or issues. Review of Systems:  Negative for Edema, SOB        Blood pressure 169/60, pulse 95, temperature 98.8 °F (37.1 °C), resp. rate 22, height 5' 7\" (1.702 m), weight 86.2 kg (190 lb), SpO2 98 %.       awake and alert   NAD  Desquamative skin noted,     Intake/Output Summary (Last 24 hours) at 2/5/2020 0841  Last data filed at 2/4/2020 1705  Gross per 24 hour   Intake --   Output 0 ml   Net 0 ml      Recent Labs     02/05/20  0605   WBC 13.7*     Lab Results   Component Value Date/Time    Sodium 136 02/05/2020 06:05 AM    Potassium 3.7 02/05/2020 06:05 AM    Chloride 105 02/05/2020 06:05 AM    CO2 26 02/05/2020 06:05 AM    Anion gap 5 02/05/2020 06:05 AM    Glucose 105 (H) 02/05/2020 06:05 AM    BUN 29 (H) 02/05/2020 06:05 AM    Creatinine 6.57 (H) 02/05/2020 06:05 AM    BUN/Creatinine ratio 4 (L) 02/05/2020 06:05 AM    GFR est AA 11 (L) 02/05/2020 06:05 AM    GFR est non-AA 9 (L) 02/05/2020 06:05 AM    Calcium 7.8 (L) 02/05/2020 06:05 AM        Current Facility-Administered Medications   Medication Dose Route Frequency Provider Last Rate Last Dose    pantoprazole (PROTONIX) tablet 40 mg  40 mg Oral ACB Betzy Tolliver MD        dextrose 5% infusion  25 mL/hr IntraVENous CONTINUOUS Betzy Tolliver MD        epoetin delonte-epbx (RETACRIT) injection 9,000 Units  9,000 Units SubCUTAneous DIALYSIS MON, WED & FRI Yanet Landry R, DO   9,000 Units at 02/03/20 2146    [START ON 2/7/2020] ceFAZolin (ANCEF) 3 g/20 mL in sterile water IV syringe  3 g IntraVENous every Friday Faith Levin MD        ceFAZolin (ANCEF) 2g IVPB in 50 mL D5W  2 g IntraVENous every Monday Faith Levin MD        ceFAZolin (ANCEF) 2g IVPB in 50 mL D5W  2 g IntraVENous every Wednesday Mandeep Nichols MD        albumin human 25% (BUMINATE) solution 25 g  25 g IntraVENous DIALYSIS PRN Yanet Landry R, DO   25 g at 01/31/20 1735    glucose chewable tablet 16 g  4 Tab Oral PRN Melina Arambula MD        glucagon (GLUCAGEN) injection 1 mg  1 mg IntraMUSCular PRN Melina Arambula MD        dextrose 10% infusion 125-250 mL  125-250 mL IntraVENous PRN Melina Arambula  mL/hr at 02/02/20 0755 250 mL at 02/02/20 0755    promethazine (PHENERGAN) 25 mg in 0.9% sodium chloride 50 mL IVPB  25 mg IntraVENous Q4H PRN Melina Arambula MD        morphine 10 mg/ml injection 4 mg  4 mg IntraVENous Q3H PRN Melina Arambula MD   4 mg at 01/30/20 1916    sodium chloride (NS) flush 5-40 mL  5-40 mL IntraVENous Q8H Sam Carpenter, DPM   Stopped at 02/04/20 2200    sodium chloride (NS) flush 5-40 mL  5-40 mL IntraVENous PRN Jen Carpentert, DPM        acetaminophen (TYLENOL) tablet 650 mg  650 mg Oral Q6H MaJen crookst, DPM   650 mg at 02/04/20 2354    HYDROmorphone (DILAUDID) tablet 2 mg  2 mg Oral Q4H PRN Jen Carpentert, DPM        HYDROmorphone (PF) (DILAUDID) injection 1 mg  1 mg IntraVENous Q4H PRN Jen Carpentert, DPM        naloxone (NARCAN) injection 0.4 mg  0.4 mg IntraVENous PRN Alondra Carpenter DPM        influenza vaccine 2019-20 (6 mos+)(PF) (FLUARIX/FLULAVAL/FLUZONE QUAD) injection 0.5 mL  0.5 mL IntraMUSCular PRIOR TO DISCHARGE Chaparrita Rutledge MD        acetaminophen (TYLENOL) tablet 650 mg  650 mg Oral Q4H PRN Chaparrita Rutledge MD   650 mg at 01/29/20 2017    naloxone [facility]) injection 0.4 mg  0.4 mg IntraVENous PRN Chaparrita Rutledge MD        diphenhydrAMINE (BENADRYL) injection 12.5 mg  12.5 mg IntraVENous Q4H PRN Chaparrita Rutledge MD        ondansetron [facility]) injection 4 mg  4 mg IntraVENous Q4H PRN Chaparrita Rutledge MD   4 mg at 01/30/20 0150    heparin (porcine) injection 5,000 Units  5,000 Units SubCUTAneous Q8H Chaparrita Rutledge MD   5,000 Units at 02/04/20 1132    hydrALAZINE (APRESOLINE) 20 mg/mL injection 10 mg  10 mg IntraVENous Q6H PRN Bibi Hui MD        carvediloL (COREG) tablet 25 mg  25 mg Oral BID WITH MEALS Bibi Hui MD   25 mg at 02/04/20 1801    isosorbide mononitrate ER (IMDUR) tablet 60 mg  60 mg Oral DAILY Bibi uHi MD   60 mg at 02/04/20 0961    hydrALAZINE (APRESOLINE) tablet 25 mg  25 mg Oral TID Bibi Hui MD   25 mg at 02/04/20 8888

## 2020-02-05 NOTE — ROUTINE PROCESS
Verbal shift change report given to Eduardo Garner RN   (oncoming nurse) by DONYA Jurado (offgoing nurse). Report included the following information SBAR, Kardex, Intake/Output, MAR and Recent Results. 0600 Pt is A/O, on bed rest, denied pain. Generalized skin peeling. R BKA and L foot with CDI dressing - done by Dr. Yonatan Salgado. Pt refused Tylenol scheduled at this time, stating he will take it later. Had a BM with use of bedpan. Dialysis scheduled MWF via fistula on [address].    0825 Bedside and Verbal shift change report given to ELEUTERIO Pond RN (oncoming nurse) by Eduardo Garner RN   (offgoing nurse). Report included the following information SBAR, Kardex, Intake/Output, MAR and Recent Results.

## 2020-02-05 NOTE — PROGRESS NOTES
INITIAL NUTRITION ASSESSMENT     RECOMMENDATIONS/PLAN:   Other: Continue w/ POC  Monitor labs/lytes, PO intakes, skin integrity, wt, fluid status, BM    REASON FOR ASSESSMENT:     [x] LOS  [] ICU admission  NUTRITION ASSESSMENT:   Client History: 54 yrs old Male admitted with sepsis, cellulitis and abscess of foot, acute osteomyelitis- s/p INCISION AND DRAINAGE, WITH AGGRESSIVE DEBRIDEMENT OF CALCANEOUS ,BIOPSY OF LEFT FOOT WITH C-ARM, dehydration-resolved, ESRD- HD, HTN, chronic anemia, hypoglycemia, hx of right BKA     PMHx: CKD, DM, heart failure, HTN   Cultural/Roman Catholic Food Preferences: None Identified    FOOD/NUTRITION HISTORY  Diet History: pt has not had a good appetite and had lots of hypoglycemia; see note on 2/3   Food Allergies:  [x] NKFA       Pertinent PTA Medications: lasix     NUTRITION INTAKE   Diet Order:  Renal     Average PO Intake:       No data found. Pertinent Medications:  [x] Reviewed; d5%, zofran, protonix,   Electrolyte Replacement Protocol: []K  []Mg  []PO4    Insulin:  [] SSI  [] Pre-meal   []  Basal   [] Drip  [] None  Pt expected to meet estimated nutrient needs through next review:          [x]  Yes     [] No;  ANTHROPOMETRICS  Height: 5' 7\" (170.2 cm)       Weight: 86.2 kg (190 lb)    BMI: 29.8 kg/m^2  -  overweight (25.0%-29.9% BMI)        Weight change: no recent wt loss                                   Comparison to Reference Standards:  IBW: 148 lbs      %IBW: 128%      AdjBW: 72.0 kg    NUTRITION-FOCUSED PHYSICAL ASSESSMENT  Skin: No PU.      GI: +BM 2/4/20    BIOCHEMICAL DATA & MEDICAL TESTS  Pertinent Labs:  [x] Reviewed; BUN-29, Creatinine-6.57, Phos-2.2 GFR est AA-11     NUTRITION PRESCRIPTION  Calories: 2520kcal/day based on 35kcal/kg of AdjBW  Protein: 86 g/day based on 1.2 g/kg of AdjBW  CHO: 315 g/day based on 50% of total energy  Fluid: 2520 ml/day based on 1 kcal/ml      NUTRITION DIAGNOSES:   1 Inadequate oral intake related to decreased appetite as evidence by pt report on 2/3    NUTRITION INTERVENTIONS:   INTERVENTIONS:        GOALS:  1. Other: Continue w/ POC 1. Encourage PO intake >50% at most meals by next review 5 days     LEARNING NEEDS (Diet, Supplementation, Food/Nutrient-Drug Interaction): Will defer to glycemic control team        NUTRITION MONITORING /EVALUATION:   Follow PO intake  Monitor wt  Monitor renal labs, electrolytes, fluid status  Monitor for additional supplement needs    [] Participated in Interdisciplinary Rounds  [x] [address] [place] [address] Reviewed/Documented  DISCHARGE NUTRITION RECOMMENDATIONS ADDRESSED:     [x] Yes- recommended Renal diet      NUTRITION RISK:     [x]  At risk                     []  Not currently at risk     Will follow-up per policy.   Sam Still [address]

## 2020-02-05 NOTE — CONSULTS
[facility]                                               (A Division of [address] [place] [address])                           Follow-up Note      Date of Admission: 1/29/2020     Date of Note:  2/5/2020    Summary:      Mr Aurelia Spaulding is a 54y AAM diabetic ESRD-HD who has been fighting a L heel DFU for weeks.  Worsened considerably this week prompting ER visit on 1/29. Adilia Liz presented with N/V and ABD pain that has improved.  Went to OR 1/30 for debridement. Interval History:  CC:  Still itching  Still feels pruritic. Foot feels better. Current Antimicrobials: Prior Antimicrobials   1. Cefazolin HD (2/3-) #1 1. Dapto IV (1/31-2/3)  2. Pip/tzb IV (1/29-2/3)  3. Levo IV (1/29)  4. Clinda IV (1/29)       Assessment Plan:   L calcaneal OM/DFU - complicated    Keep pushing. -POD#6       RASH    Eosinophils still present; may take few more days to resolve.     ESRD-HD    DMT2      Microbiology:                1/30 - OR#1 - NOS (+) MSSA and Citrobacter koseri (S to all)                                                      OR#2 - NOS (+) MSSA and Citrobacter koseri (S to all)                                         1/29 - Wd (+) MSSA and CoNS                                                      Wd #2 - (+) MSSA and Citrobacter koseri (S to all)        Lines / [place] N[place]er AVG - no peripheral (lost)         Patient Active Problem List   Diagnosis Code    Hyponatremia E87.1    Anemia of chronic disease D63.8    Acute on chronic renal insufficiency N28.9, N18.9    Acute exacerbation of CHF (congestive heart failure) (HCC) I50.9    Iron deficiency anemia D50.9    Other restrictive cardiomyopathy ([address] I42.5    Dehydration E86.0    ESRD on hemodialysis ([address] N18.6, Z99.2    Cellulitis and abscess of foot L03.119, L02.619    HTN (hypertension) I10    Acute osteomyelitis ([address] M86.10    Hx of BKA, right ([address] Z89.511    Esophagitis K20.9    Hypoglycemia E16.2 Current Facility-Administered Medications   Medication Dose Route Frequency    pantoprazole (PROTONIX) tablet 40 mg  40 mg Oral ACB    dextrose 5% infusion  25 mL/hr IntraVENous CONTINUOUS    epoetin delonte-epbx (RETACRIT) injection 9,000 Units  9,000 Units SubCUTAneous DIALYSIS MON, WED & FRI    [START ON 2/7/2020] ceFAZolin (ANCEF) 3 g/20 mL in sterile water IV syringe  3 g IntraVENous every Friday    ceFAZolin (ANCEF) 2g IVPB in 50 mL D5W  2 g IntraVENous every Monday    ceFAZolin (ANCEF) 2g IVPB in 50 mL D5W  2 g IntraVENous every Wednesday    albumin human 25% (BUMINATE) solution 25 g  25 g IntraVENous DIALYSIS PRN    glucose chewable tablet 16 g  4 Tab Oral PRN    glucagon (GLUCAGEN) injection 1 mg  1 mg IntraMUSCular PRN    dextrose 10% infusion 125-250 mL  125-250 mL IntraVENous PRN    promethazine (PHENERGAN) 25 mg in 0.9% sodium chloride 50 mL IVPB  25 mg IntraVENous Q4H PRN    morphine 10 mg/ml injection 4 mg  4 mg IntraVENous Q3H PRN    sodium chloride (NS) flush 5-40 mL  5-40 mL IntraVENous Q8H    sodium chloride (NS) flush 5-40 mL  5-40 mL IntraVENous PRN    acetaminophen (TYLENOL) tablet 650 mg  650 mg Oral Q6H    HYDROmorphone (DILAUDID) tablet 2 mg  2 mg Oral Q4H PRN    HYDROmorphone (PF) (DILAUDID) injection 1 mg  1 mg IntraVENous Q4H PRN    naloxone (NARCAN) injection 0.4 mg  0.4 mg IntraVENous PRN    influenza vaccine 2019-20 (6 mos+)(PF) (FLUARIX/FLULAVAL/FLUZONE QUAD) injection 0.5 mL  0.5 mL IntraMUSCular PRIOR TO DISCHARGE    acetaminophen (TYLENOL) tablet 650 mg  650 mg Oral Q4H PRN    naloxone (NARCAN) injection 0.4 mg  0.4 mg IntraVENous PRN    diphenhydrAMINE (BENADRYL) injection 12.5 mg  12.5 mg IntraVENous Q4H PRN    ondansetron (ZOFRAN) injection 4 mg  4 mg IntraVENous Q4H PRN    heparin (porcine) injection 5,000 Units  5,000 Units SubCUTAneous Q8H    hydrALAZINE (APRESOLINE) 20 mg/mL injection 10 mg  10 mg IntraVENous Q6H PRN    carvediloL (COREG) tablet 25 mg  25 mg Oral BID WITH MEALS    isosorbide mononitrate ER (IMDUR) tablet 60 mg  60 mg Oral DAILY    hydrALAZINE (APRESOLINE) tablet 25 mg  25 mg Oral TID         Review of Systems - General ROS: negative for - chills, fever or night sweats  Respiratory ROS: no cough, shortness of breath, or wheezing  Cardiovascular ROS: no chest pain or dyspnea on exertion  Gastrointestinal ROS: no abdominal pain, change in bowel habits, or black or bloody stools       Objective:  Visit Vitals  /60 (BP 1 Location: Right arm, BP Patient Position: At rest;Head of bed elevated (Comment degrees))   Pulse 94   Temp 99.4 °F (37.4 °C)   Resp 22   Ht 5' 7\" (1.702 m)   Wt 86.2 kg (190 lb)   SpO2 92%   BMI 29.76 kg/m²       Temp (24hrs), Av.7 °F (37.1 °C), Min:98.2 °F (36.8 °C), Max:99.4 °F (37.4 °C)      GEN: WDWN AAM in NAD - scaly rash all over body exfoliating  HEENT: anicteric         Lab results:    Chemistry  Recent Labs     20  0605 20  0453 20  0439   * 84 41*    137 137   K 3.7 3.5 4.0    104 104   CO2 26 26 26   BUN 29* 19* 30*   CREA 6.57* 5.03* 7.64*   CA 7.8* 7.9* 7.9*   AGAP 5 7 7   BUCR 4* 4* 4*       CBC w/ Diff  Recent Labs     20  0620  0453 20  0439   WBC 13.7* 14.1* 22.0*   RBC 3.04* 3.10* 3.29*   HGB 7.3* 7.5* 8.0*   HCT 22.7* 23.4* 25.1*    200 207   GRANS 76* 80* 89*   LYMPH 5* 9* 4*   EOS 5 4 4       Microbiology  All Micro Results     Procedure Component Value Units Date/Time    ANAEROBE ID REFLEX [[id number]]  (Abnormal) Collected:  20 1434    Order Status:  Completed Specimen:  MICROBIAL ISOLATE Updated:  20 1236     Source PENDING     Result 1 Comment        Comment: (NOTE)  No anaerobic growth in 72 hours. Result 2 Comment        Comment: (NOTE)  Aerobic bacteria have been isolated. For further identification and  susceptibility testing, please contact the lab within one week.   Performed At: [facility] [address] [id number]  Indio Pereira MD TD:[id number]         Mendez Herndon REFLEX [[id number]]  (Abnormal) Collected:  01/30/20 1430    Order Status:  Completed Specimen:  MICROBIAL ISOLATE Updated:  02/03/20 1236     Source PENDING     Result 1 Comment        Comment: (NOTE)  No anaerobic growth in 72 hours. Result 2 Comment        Comment: (NOTE)  Aerobic bacteria have been isolated. For further identification and  susceptibility testing, please contact the lab within one week.   Performed At: [address] [id number]  Indio Pereira MD RO:[id number]         Ewelina Pino STAIN [[id number]]  (Abnormal)  (Susceptibility) Collected:  01/30/20 1430    Order Status:  Completed Specimen:  Wound from Heel Updated:  02/03/20 0742     Special Requests: NO SPECIAL REQUESTS        GRAM STAIN NO ORGANISMS SEEN         NO WBC'S SEEN        Culture result: FEW STAPHYLOCOCCUS AUREUS         RARE CITROBACTER KOSERI       CULTURE, TISSUE W Julian Kingen [[id number]]  (Abnormal)  (Susceptibility) Collected:  01/30/20 1434    Order Status:  Completed Specimen:  Tissue Updated:  02/02/20 0751     Special Requests: NO SPECIAL REQUESTS        GRAM STAIN FEW WBC'S         NO ORGANISMS SEEN        Culture result: FEW STAPHYLOCOCCUS AUREUS         FEW CITROBACTER KOSERI       CULTURE, WOUND Nellie Dayhoff STAIN [[id number]]  (Abnormal)  (Susceptibility) Collected:  01/29/20 0500    Order Status:  Completed Specimen:  Wound from Heel Updated:  02/02/20 0730     Special Requests: NO SPECIAL REQUESTS        GRAM STAIN NO WBC'S SEEN         NO ORGANISMS SEEN        Culture result: FEW CITROBACTER KOSERI               FEW STAPHYLOCOCCUS SPECIES, COAGULASE NEGATIVE            FEW STAPHYLOCOCCUS AUREUS       CULTURE, WOUND Nellie Dayhoff STAIN [[id number]]  (Abnormal)  (Susceptibility) Collected:  01/29/20 0500    Order Status:  Completed Specimen:  Wound from Ankle Updated: 02/01/20 0726     Special Requests: NO SPECIAL REQUESTS        GRAM STAIN MANY WBC'S               MODERATE GRAM POSITIVE COCCI IN GROUPS           Culture result:       MODERATE STAPHYLOCOCCUS AUREUS                  MODERATE CITROBACTER KOSERI          AFB CULTURE + SMEAR W/RFLX ID FROM CULTURE [[id number]] Collected:  01/30/20 1430    Order Status:  Completed Specimen:  Miscellaneous sample Updated:  02/01/20 0636     Source LEFT HEEL        AFB Specimen processing Concentration     Acid Fast Smear NEGATIVE         Comment: (NOTE)  Performed At: [address] [id number]  Guilherme Calvin MD MS:[id number]          Acid Fast Culture PENDING    [facility]) [[id number]] Collected:  01/30/20 1430    Order Status:  Completed Specimen:  Various Updated:  01/30/20 [address], MD  cell [phone]  [facility]   2/5/2020   2:04 PM

## 2020-02-05 NOTE — DISCHARGE INSTRUCTIONS
Skin Abscess: Care Instructions  Your Care Instructions    A skin abscess is a bacterial infection that forms a pocket of pus. A boil is a kind of skin abscess. The doctor may have cut an opening in the abscess so that the pus can drain out. You may have gauze in the cut so that the abscess will stay open and keep draining. You may need antibiotics. You will need to follow up with your doctor to make sure the infection has gone away. The doctor has checked you carefully, but problems can develop later. If you notice any problems or new symptoms, get medical treatment right away. Follow-up care is a key part of your treatment and safety. Be sure to make and go to all appointments, and call your doctor if you are having problems. It's also a good idea to know your test results and keep a list of the medicines you take. How can you care for yourself at home? · Apply warm and dry compresses, a heating pad set on low, or a hot water bottle 3 or 4 times a day for pain. Keep a cloth between the heat source and your skin. · If your doctor prescribed antibiotics, take them as directed. Do not stop taking them just because you feel better. You need to take the full course of antibiotics. · Take pain medicines exactly as directed. ? If the doctor gave you a prescription medicine for pain, take it as prescribed. ? If you are not taking a prescription pain medicine, ask your doctor if you can take an over-the-counter medicine. · Keep your bandage clean and dry. Change the bandage whenever it gets wet or dirty, or at least one time a day. · If the abscess was packed with gauze:  ? Keep follow-up appointments to have the gauze changed or removed. If the doctor instructed you to remove the gauze, follow the instructions you were given for how to remove it. ? After the gauze is removed, soak the area in warm water for 15 to 20 minutes 2 times a day, until the wound closes. When should you call for help?   Call your doctor now or seek immediate medical care if:    · You have signs of worsening infection, such as:  ? Increased pain, swelling, warmth, or redness. ? Red streaks leading from the infected skin. ? Pus draining from the wound. ? A fever.    Watch closely for changes in your health, and be sure to contact your doctor if:    · You do not get better as expected. Where can you learn more? Go to http://josé luis-anh.info/. Enter N539 in the search box to learn more about \"Skin Abscess: Care Instructions. \"  Current as of: April 1, 2019  Content Version: 12.2  © 4316-6945 [employer]. Care instructions adapted under license by [employer] (which disclaims liability or warranty for this information). If you have questions about a medical condition or this instruction, always ask your healthcare professional. [place] [address] any warranty or liability for your use of this information. Patient Education        Dehydration: Care Instructions  Your Care Instructions  Dehydration happens when your body loses too much fluid. This might happen when you do not drink enough water or you lose large amounts of fluids from your body because of diarrhea, vomiting, or sweating. Severe dehydration can be life-threatening. Water and minerals called electrolytes help put your body fluids back in balance. Learn the early signs of fluid loss, and drink more fluids to prevent dehydration. Follow-up care is a key part of your treatment and safety. Be sure to make and go to all appointments, and call your doctor if you are having problems. It's also a good idea to know your test results and keep a list of the medicines you take. How can you care for yourself at home? · To prevent dehydration, drink plenty of fluids, enough so that your urine is light yellow or clear like water. Choose water and other caffeine-free clear liquids until you feel better.  If you have kidney, heart, or liver disease and have to limit fluids, talk with your doctor before you increase the amount of fluids you drink. · If you do not feel like eating or drinking, try taking small sips of water, sports drinks, or other rehydration drinks. · Get plenty of rest.  To prevent dehydration  · Add more fluids to your diet and daily routine, unless your doctor has told you not to. · During hot weather, drink more fluids. Drink even more fluids if you exercise a lot. Stay away from drinks with alcohol or caffeine. · Watch for the symptoms of dehydration. These include:  ? A dry, sticky mouth. ? Dark yellow urine, and not much of it. ? Dry and sunken eyes. ? Feeling very tired. · Learn what problems can lead to dehydration. These include:  ? Diarrhea, fever, and vomiting. ? Any illness with a fever, such as pneumonia or the flu. ? Activities that cause heavy sweating, such as endurance races and heavy outdoor work in hot or humid weather. ? Alcohol or drug use or problems caused by quitting their use (withdrawal). ? Certain medicines, such as cold and allergy pills (antihistamines), diet pills (diuretics), and laxatives. ? Certain diseases, such as diabetes, cancer, and heart or kidney disease. When should you call for help? Call 911 anytime you think you may need emergency care. For example, call if:    · You passed out (lost consciousness).    Call your doctor now or seek immediate medical care if:    · You are confused and cannot think clearly.     · You are dizzy or lightheaded, or you feel like you may faint.     · You have signs of needing more fluids. You have sunken eyes and a dry mouth, and you pass only a little dark urine.     · You cannot keep fluids down.    Watch closely for changes in your health, and be sure to contact your doctor if:    · You are not making tears.     · Your skin is very dry and sags slowly back into place after you pinch it.     · Your mouth and eyes are very dry. Where can you learn more? Go to http://josé luis-anh.info/. Enter X973 in the search box to learn more about \"Dehydration: Care Instructions. \"  Current as of: June 26, 2019  Content Version: 12.2  © 9714-8284 [employer]. Care instructions adapted under license by [employer] (which disclaims liability or warranty for this information). If you have questions about a medical condition or this instruction, always ask your healthcare professional. [place] [address] any warranty or liability for your use of this information.

## 2020-02-05 NOTE — HOME CARE
Met with patient in room Verified demographics and explained services. Asked him if he has heard from [address]. He states no. Spoke with [facility]. They have left 2 messages for him about delivery. He states his cell phone is dead. Gave him phone number to call [facility].    Jenny Nair RN, BSN  [employer]

## 2020-02-05 NOTE — PROGRESS NOTES
D/C Plan: [facility] and physician follow up 2/5/2020     CM and provider met with pt at bedside to discuss transition of care. Pt continues to refuse rehab. Pt prefers to transition home with [facility]. Provider has indicated pt will have IVABX after dialysis. Pt has an order for a wheel chair and a sliding board to be facilitated by [facility]. Anticipate will transition home today [facility]. Pt may need assistance with medicaid transport home today if his brother is not able to transport him home. No other transition of care needs have been identified. Care Management Interventions  PCP Verified by CM: Yes(ASKING FOR NEW PCP)  Mode of Transport at Discharge:  Other (see comment)(medicaid transport)  Transition of Care Consult (CM Consult): Home Health, Discharge [facility]: Yes  Health Maintenance Reviewed: Yes  Physical Therapy Consult: Yes  Current Support Network: Lives Alone, Family Lives Nearby  Confirm Follow Up Transport: Other (see comment)(medicaid)  The Plan for Transition of Care is Related to the Following Treatment Goals : HOME WITH MD FOLLOW UP, [facility]  The Patient and/or Patient Representative was Provided with a Choice of Provider and Agrees with the Discharge Plan?: Yes  Name of the Patient Representative Who was Provided with a Choice of Provider and Agrees with the Discharge Plan: PATIENT  Freedom of Choice List was Provided with Basic Dialogue that Supports the Patient's Individualized Plan of Care/Goals, Treatment Preferences and Shares the Quality Data Associated with the Providers?: Yes  Discharge Location  Discharge Placement: Home with home health

## 2020-02-06 LAB
1,5-ANHYDROGLUCITOL SERPL-MCNC: 2.1 UG/ML
HBA1C MFR BLD: 4.9 %HB

## 2020-02-07 ENCOUNTER — HOME CARE VISIT (OUTPATIENT)
Dept: HOME HEALTH SERVICES | Facility: HOME HEALTH | Age: 50
End: 2020-02-07
Payer: MEDICARE

## 2020-02-07 ENCOUNTER — HOME CARE VISIT (OUTPATIENT)
Dept: SCHEDULING | Facility: HOME HEALTH | Age: 50
End: 2020-02-07
Payer: MEDICARE

## 2020-02-07 PROCEDURE — A6252 ABSORPT DRG >16 <=48 W/O BDR: HCPCS

## 2020-02-07 PROCEDURE — A6450 LT COMPRES BAND >=5"/YD: HCPCS

## 2020-02-07 PROCEDURE — 3331090001 HH PPS REVENUE CREDIT

## 2020-02-07 PROCEDURE — 400013 HH SOC

## 2020-02-07 PROCEDURE — G0299 HHS/HOSPICE OF RN EA 15 MIN: HCPCS

## 2020-02-07 PROCEDURE — A6446 CONFORM BAND S W>=3" <5"/YD: HCPCS

## 2020-02-07 PROCEDURE — 3331090002 HH PPS REVENUE DEBIT

## 2020-02-08 VITALS
TEMPERATURE: 98.3 F | SYSTOLIC BLOOD PRESSURE: 181 MMHG | HEART RATE: 89 BPM | DIASTOLIC BLOOD PRESSURE: 102 MMHG | RESPIRATION RATE: 14 BRPM | OXYGEN SATURATION: 97 %

## 2020-02-08 PROCEDURE — 3331090001 HH PPS REVENUE CREDIT

## 2020-02-08 PROCEDURE — 3331090002 HH PPS REVENUE DEBIT

## 2020-02-09 PROCEDURE — 3331090001 HH PPS REVENUE CREDIT

## 2020-02-09 PROCEDURE — 3331090002 HH PPS REVENUE DEBIT

## 2020-02-10 ENCOUNTER — HOME CARE VISIT (OUTPATIENT)
Dept: HOME HEALTH SERVICES | Facility: HOME HEALTH | Age: 50
End: 2020-02-10
Payer: MEDICARE

## 2020-02-10 ENCOUNTER — HOME CARE VISIT (OUTPATIENT)
Dept: SCHEDULING | Facility: HOME HEALTH | Age: 50
End: 2020-02-10
Payer: MEDICARE

## 2020-02-10 PROCEDURE — G0152 HHCP-SERV OF OT,EA 15 MIN: HCPCS

## 2020-02-10 PROCEDURE — 3331090001 HH PPS REVENUE CREDIT

## 2020-02-10 PROCEDURE — 3331090002 HH PPS REVENUE DEBIT

## 2020-02-11 ENCOUNTER — HOME CARE VISIT (OUTPATIENT)
Dept: HOME HEALTH SERVICES | Facility: HOME HEALTH | Age: 50
End: 2020-02-11
Payer: MEDICARE

## 2020-02-11 VITALS
DIASTOLIC BLOOD PRESSURE: 78 MMHG | HEART RATE: 94 BPM | SYSTOLIC BLOOD PRESSURE: 164 MMHG | OXYGEN SATURATION: 98 % | TEMPERATURE: 98.2 F

## 2020-02-11 PROCEDURE — 3331090002 HH PPS REVENUE DEBIT

## 2020-02-11 PROCEDURE — 3331090001 HH PPS REVENUE CREDIT

## 2020-02-12 ENCOUNTER — HOME CARE VISIT (OUTPATIENT)
Dept: HOME HEALTH SERVICES | Facility: HOME HEALTH | Age: 50
End: 2020-02-12
Payer: MEDICARE

## 2020-02-12 ENCOUNTER — HOME CARE VISIT (OUTPATIENT)
Dept: SCHEDULING | Facility: HOME HEALTH | Age: 50
End: 2020-02-12
Payer: MEDICARE

## 2020-02-12 LAB
BACTERIA SPEC CULT: ABNORMAL
SOURCE, RSRC56: ABNORMAL
SOURCE, RSRC56: ABNORMAL

## 2020-02-12 PROCEDURE — G0155 HHCP-SVS OF CSW,EA 15 MIN: HCPCS

## 2020-02-12 PROCEDURE — 3331090002 HH PPS REVENUE DEBIT

## 2020-02-12 PROCEDURE — 3331090001 HH PPS REVENUE CREDIT

## 2020-02-13 PROCEDURE — 3331090001 HH PPS REVENUE CREDIT

## 2020-02-13 PROCEDURE — 3331090002 HH PPS REVENUE DEBIT

## 2020-02-14 ENCOUNTER — HOME CARE VISIT (OUTPATIENT)
Dept: SCHEDULING | Facility: HOME HEALTH | Age: 50
End: 2020-02-14
Payer: MEDICARE

## 2020-02-14 PROCEDURE — G0299 HHS/HOSPICE OF RN EA 15 MIN: HCPCS

## 2020-02-14 PROCEDURE — 3331090002 HH PPS REVENUE DEBIT

## 2020-02-14 PROCEDURE — 3331090001 HH PPS REVENUE CREDIT

## 2020-02-15 VITALS
TEMPERATURE: 97 F | OXYGEN SATURATION: 100 % | SYSTOLIC BLOOD PRESSURE: 179 MMHG | HEART RATE: 99 BPM | RESPIRATION RATE: 14 BRPM | DIASTOLIC BLOOD PRESSURE: 99 MMHG

## 2020-02-15 PROCEDURE — 3331090001 HH PPS REVENUE CREDIT

## 2020-02-15 PROCEDURE — 3331090002 HH PPS REVENUE DEBIT

## 2020-02-16 PROCEDURE — 3331090002 HH PPS REVENUE DEBIT

## 2020-02-16 PROCEDURE — 3331090001 HH PPS REVENUE CREDIT

## 2020-02-17 ENCOUNTER — HOME CARE VISIT (OUTPATIENT)
Dept: SCHEDULING | Facility: HOME HEALTH | Age: 50
End: 2020-02-17
Payer: MEDICARE

## 2020-02-17 VITALS
RESPIRATION RATE: 16 BRPM | TEMPERATURE: 96.8 F | SYSTOLIC BLOOD PRESSURE: 194 MMHG | OXYGEN SATURATION: 100 % | DIASTOLIC BLOOD PRESSURE: 99 MMHG | HEART RATE: 106 BPM

## 2020-02-17 PROCEDURE — A4649 SURGICAL SUPPLIES: HCPCS

## 2020-02-17 PROCEDURE — 3331090002 HH PPS REVENUE DEBIT

## 2020-02-17 PROCEDURE — 3331090001 HH PPS REVENUE CREDIT

## 2020-02-17 PROCEDURE — G0299 HHS/HOSPICE OF RN EA 15 MIN: HCPCS

## 2020-02-18 PROCEDURE — 3331090001 HH PPS REVENUE CREDIT

## 2020-02-18 PROCEDURE — 3331090002 HH PPS REVENUE DEBIT

## 2020-02-19 ENCOUNTER — HOME CARE VISIT (OUTPATIENT)
Dept: SCHEDULING | Facility: HOME HEALTH | Age: 50
End: 2020-02-19
Payer: MEDICARE

## 2020-02-19 PROCEDURE — G0152 HHCP-SERV OF OT,EA 15 MIN: HCPCS

## 2020-02-19 PROCEDURE — 3331090001 HH PPS REVENUE CREDIT

## 2020-02-19 PROCEDURE — 3331090002 HH PPS REVENUE DEBIT

## 2020-02-20 VITALS
HEART RATE: 93 BPM | SYSTOLIC BLOOD PRESSURE: 168 MMHG | TEMPERATURE: 98.1 F | OXYGEN SATURATION: 98 % | DIASTOLIC BLOOD PRESSURE: 90 MMHG

## 2020-02-20 PROCEDURE — 3331090001 HH PPS REVENUE CREDIT

## 2020-02-20 PROCEDURE — A6446 CONFORM BAND S W>=3" <5"/YD: HCPCS

## 2020-02-20 PROCEDURE — 3331090002 HH PPS REVENUE DEBIT

## 2020-02-21 ENCOUNTER — HOME CARE VISIT (OUTPATIENT)
Dept: HOME HEALTH SERVICES | Facility: HOME HEALTH | Age: 50
End: 2020-02-21
Payer: MEDICARE

## 2020-02-21 PROCEDURE — 3331090002 HH PPS REVENUE DEBIT

## 2020-02-21 PROCEDURE — 3331090001 HH PPS REVENUE CREDIT

## 2020-02-22 ENCOUNTER — HOME CARE VISIT (OUTPATIENT)
Dept: SCHEDULING | Facility: HOME HEALTH | Age: 50
End: 2020-02-22
Payer: MEDICARE

## 2020-02-22 PROCEDURE — 3331090002 HH PPS REVENUE DEBIT

## 2020-02-22 PROCEDURE — 3331090001 HH PPS REVENUE CREDIT

## 2020-02-22 PROCEDURE — G0299 HHS/HOSPICE OF RN EA 15 MIN: HCPCS

## 2020-02-23 PROCEDURE — 3331090002 HH PPS REVENUE DEBIT

## 2020-02-23 PROCEDURE — 3331090001 HH PPS REVENUE CREDIT

## 2020-02-24 VITALS
DIASTOLIC BLOOD PRESSURE: 76 MMHG | HEART RATE: 83 BPM | SYSTOLIC BLOOD PRESSURE: 160 MMHG | RESPIRATION RATE: 16 BRPM | TEMPERATURE: 98.7 F | OXYGEN SATURATION: 97 %

## 2020-02-24 PROCEDURE — 3331090002 HH PPS REVENUE DEBIT

## 2020-02-24 PROCEDURE — 3331090001 HH PPS REVENUE CREDIT

## 2020-02-25 PROCEDURE — 3331090002 HH PPS REVENUE DEBIT

## 2020-02-25 PROCEDURE — 3331090001 HH PPS REVENUE CREDIT

## 2020-02-26 ENCOUNTER — HOME CARE VISIT (OUTPATIENT)
Dept: HOME HEALTH SERVICES | Facility: HOME HEALTH | Age: 50
End: 2020-02-26
Payer: MEDICARE

## 2020-02-26 PROCEDURE — 3331090001 HH PPS REVENUE CREDIT

## 2020-02-26 PROCEDURE — 3331090002 HH PPS REVENUE DEBIT

## 2020-02-27 PROCEDURE — 3331090002 HH PPS REVENUE DEBIT

## 2020-02-27 PROCEDURE — 3331090001 HH PPS REVENUE CREDIT

## 2020-02-28 ENCOUNTER — HOME CARE VISIT (OUTPATIENT)
Dept: HOME HEALTH SERVICES | Facility: HOME HEALTH | Age: 50
End: 2020-02-28
Payer: MEDICARE

## 2020-02-28 PROCEDURE — 3331090001 HH PPS REVENUE CREDIT

## 2020-02-28 PROCEDURE — 3331090002 HH PPS REVENUE DEBIT

## 2020-02-29 PROCEDURE — 3331090001 HH PPS REVENUE CREDIT

## 2020-02-29 PROCEDURE — 3331090002 HH PPS REVENUE DEBIT

## 2020-03-01 PROCEDURE — 3331090002 HH PPS REVENUE DEBIT

## 2020-03-01 PROCEDURE — 3331090001 HH PPS REVENUE CREDIT

## 2020-03-02 ENCOUNTER — HOME CARE VISIT (OUTPATIENT)
Dept: HOME HEALTH SERVICES | Facility: HOME HEALTH | Age: 50
End: 2020-03-02
Payer: MEDICARE

## 2020-03-02 LAB
FUNGUS (MYCOLOGY) CULTURE, FUNGCT: NORMAL
RESULT 1, 080094: NORMAL
SPECIMEN SOURCE: NORMAL

## 2020-03-02 PROCEDURE — 3331090001 HH PPS REVENUE CREDIT

## 2020-03-02 PROCEDURE — 3331090002 HH PPS REVENUE DEBIT

## 2020-03-03 PROCEDURE — 3331090002 HH PPS REVENUE DEBIT

## 2020-03-03 PROCEDURE — 3331090001 HH PPS REVENUE CREDIT

## 2020-03-04 PROCEDURE — 3331090002 HH PPS REVENUE DEBIT

## 2020-03-04 PROCEDURE — 3331090001 HH PPS REVENUE CREDIT

## 2020-03-05 PROCEDURE — 3331090001 HH PPS REVENUE CREDIT

## 2020-03-05 PROCEDURE — 3331090002 HH PPS REVENUE DEBIT

## 2020-03-06 ENCOUNTER — HOME CARE VISIT (OUTPATIENT)
Dept: SCHEDULING | Facility: HOME HEALTH | Age: 50
End: 2020-03-06
Payer: MEDICARE

## 2020-03-06 PROCEDURE — G0495 RN CARE TRAIN/EDU IN HH: HCPCS

## 2020-03-06 PROCEDURE — 3331090001 HH PPS REVENUE CREDIT

## 2020-03-06 PROCEDURE — 3331090002 HH PPS REVENUE DEBIT

## 2020-03-07 VITALS
TEMPERATURE: 97.2 F | HEART RATE: 88 BPM | RESPIRATION RATE: 14 BRPM | OXYGEN SATURATION: 99 % | DIASTOLIC BLOOD PRESSURE: 91 MMHG | SYSTOLIC BLOOD PRESSURE: 171 MMHG

## 2020-03-07 PROCEDURE — A6216 NON-STERILE GAUZE<=16 SQ IN: HCPCS

## 2020-03-07 PROCEDURE — A6446 CONFORM BAND S W>=3" <5"/YD: HCPCS

## 2020-03-07 PROCEDURE — 3331090002 HH PPS REVENUE DEBIT

## 2020-03-07 PROCEDURE — 3331090001 HH PPS REVENUE CREDIT

## 2020-03-08 PROCEDURE — 3331090002 HH PPS REVENUE DEBIT

## 2020-03-08 PROCEDURE — 3331090001 HH PPS REVENUE CREDIT

## 2020-03-09 ENCOUNTER — HOME CARE VISIT (OUTPATIENT)
Dept: SCHEDULING | Facility: HOME HEALTH | Age: 50
End: 2020-03-09
Payer: MEDICARE

## 2020-03-09 VITALS
SYSTOLIC BLOOD PRESSURE: 199 MMHG | HEART RATE: 90 BPM | DIASTOLIC BLOOD PRESSURE: 105 MMHG | OXYGEN SATURATION: 99 % | TEMPERATURE: 96.4 F | RESPIRATION RATE: 14 BRPM

## 2020-03-09 PROCEDURE — A6252 ABSORPT DRG >16 <=48 W/O BDR: HCPCS

## 2020-03-09 PROCEDURE — A6446 CONFORM BAND S W>=3" <5"/YD: HCPCS

## 2020-03-09 PROCEDURE — 3331090002 HH PPS REVENUE DEBIT

## 2020-03-09 PROCEDURE — 3331090001 HH PPS REVENUE CREDIT

## 2020-03-09 PROCEDURE — G0299 HHS/HOSPICE OF RN EA 15 MIN: HCPCS

## 2020-03-09 PROCEDURE — 400013 HH SOC

## 2020-03-10 PROCEDURE — 3331090001 HH PPS REVENUE CREDIT

## 2020-03-10 PROCEDURE — 3331090002 HH PPS REVENUE DEBIT

## 2020-03-11 PROCEDURE — 3331090002 HH PPS REVENUE DEBIT

## 2020-03-11 PROCEDURE — 3331090001 HH PPS REVENUE CREDIT

## 2020-03-12 PROCEDURE — 3331090001 HH PPS REVENUE CREDIT

## 2020-03-12 PROCEDURE — 3331090002 HH PPS REVENUE DEBIT

## 2020-03-13 ENCOUNTER — HOME CARE VISIT (OUTPATIENT)
Dept: SCHEDULING | Facility: HOME HEALTH | Age: 50
End: 2020-03-13
Payer: MEDICARE

## 2020-03-13 PROCEDURE — 3331090002 HH PPS REVENUE DEBIT

## 2020-03-13 PROCEDURE — G0299 HHS/HOSPICE OF RN EA 15 MIN: HCPCS

## 2020-03-13 PROCEDURE — 3331090001 HH PPS REVENUE CREDIT

## 2020-03-14 VITALS
RESPIRATION RATE: 14 BRPM | TEMPERATURE: 98.7 F | DIASTOLIC BLOOD PRESSURE: 89 MMHG | OXYGEN SATURATION: 99 % | SYSTOLIC BLOOD PRESSURE: 169 MMHG | HEART RATE: 88 BPM

## 2020-03-14 PROCEDURE — 3331090002 HH PPS REVENUE DEBIT

## 2020-03-14 PROCEDURE — 3331090001 HH PPS REVENUE CREDIT

## 2020-03-14 NOTE — PROGRESS NOTES
Patient is being very non-compliant with keeping his L leg elevated. His L wound improved greatly while he was in the hospital last week, but since he has come home, the wound is getting worse. His left leg is very swollen and the wounds are growing in size. It was explained to the patient that if he did not comply with elevating his leg, if there was no improvement or the wound continues to get worse, SN would not be able to keep co indira to see him, due to his non-compliance and he would be discharged from home health. Patient stated understanding. Patient was also supposed to schedule a visit to go see Dr Jorge Arita, for as soon as possible, on Monday and he still had not done so when I saw him today. Case communication with Crispin Melendez about all of these issues.

## 2020-03-15 PROCEDURE — 3331090001 HH PPS REVENUE CREDIT

## 2020-03-15 PROCEDURE — 3331090002 HH PPS REVENUE DEBIT

## 2020-03-16 ENCOUNTER — HOME CARE VISIT (OUTPATIENT)
Dept: SCHEDULING | Facility: HOME HEALTH | Age: 50
End: 2020-03-16
Payer: MEDICARE

## 2020-03-16 VITALS
SYSTOLIC BLOOD PRESSURE: 203 MMHG | DIASTOLIC BLOOD PRESSURE: 111 MMHG | OXYGEN SATURATION: 100 % | TEMPERATURE: 97.3 F | RESPIRATION RATE: 14 BRPM | HEART RATE: 100 BPM

## 2020-03-16 LAB
ACID FAST STN SPEC: NEGATIVE
MYCOBACTERIUM SPEC QL CULT: NEGATIVE
SPECIMEN PREPARATION: NORMAL
SPECIMEN SOURCE: NORMAL

## 2020-03-16 PROCEDURE — G0299 HHS/HOSPICE OF RN EA 15 MIN: HCPCS

## 2020-03-16 PROCEDURE — A4452 WATERPROOF TAPE: HCPCS

## 2020-03-16 PROCEDURE — A4649 SURGICAL SUPPLIES: HCPCS

## 2020-03-16 PROCEDURE — 3331090001 HH PPS REVENUE CREDIT

## 2020-03-16 PROCEDURE — 3331090002 HH PPS REVENUE DEBIT

## 2020-03-17 PROCEDURE — 3331090001 HH PPS REVENUE CREDIT

## 2020-03-17 PROCEDURE — 3331090002 HH PPS REVENUE DEBIT

## 2020-03-18 ENCOUNTER — HOME CARE VISIT (OUTPATIENT)
Dept: HOME HEALTH SERVICES | Facility: HOME HEALTH | Age: 50
End: 2020-03-18
Payer: MEDICARE

## 2020-03-18 PROCEDURE — 3331090001 HH PPS REVENUE CREDIT

## 2020-03-18 PROCEDURE — 3331090002 HH PPS REVENUE DEBIT

## 2020-03-19 PROCEDURE — 3331090002 HH PPS REVENUE DEBIT

## 2020-03-19 PROCEDURE — 3331090001 HH PPS REVENUE CREDIT

## 2020-03-20 ENCOUNTER — HOME CARE VISIT (OUTPATIENT)
Dept: HOME HEALTH SERVICES | Facility: HOME HEALTH | Age: 50
End: 2020-03-20
Payer: MEDICARE

## 2020-03-20 PROCEDURE — 3331090001 HH PPS REVENUE CREDIT

## 2020-03-20 PROCEDURE — 3331090002 HH PPS REVENUE DEBIT

## 2020-03-21 PROCEDURE — 3331090002 HH PPS REVENUE DEBIT

## 2020-03-21 PROCEDURE — 3331090001 HH PPS REVENUE CREDIT

## 2020-03-22 PROCEDURE — 3331090001 HH PPS REVENUE CREDIT

## 2020-03-22 PROCEDURE — 3331090002 HH PPS REVENUE DEBIT

## 2020-03-22 PROCEDURE — A6252 ABSORPT DRG >16 <=48 W/O BDR: HCPCS

## 2020-03-23 ENCOUNTER — HOME CARE VISIT (OUTPATIENT)
Dept: SCHEDULING | Facility: HOME HEALTH | Age: 50
End: 2020-03-23
Payer: MEDICARE

## 2020-03-23 VITALS
OXYGEN SATURATION: 99 % | TEMPERATURE: 97.6 F | HEART RATE: 91 BPM | SYSTOLIC BLOOD PRESSURE: 182 MMHG | RESPIRATION RATE: 14 BRPM | DIASTOLIC BLOOD PRESSURE: 106 MMHG

## 2020-03-23 PROCEDURE — G0299 HHS/HOSPICE OF RN EA 15 MIN: HCPCS

## 2020-03-23 PROCEDURE — 3331090001 HH PPS REVENUE CREDIT

## 2020-03-23 PROCEDURE — A6253 ABSORPT DRG > 48 SQ IN W/O B: HCPCS

## 2020-03-23 PROCEDURE — 3331090002 HH PPS REVENUE DEBIT

## 2020-03-24 PROCEDURE — 3331090001 HH PPS REVENUE CREDIT

## 2020-03-24 PROCEDURE — 3331090002 HH PPS REVENUE DEBIT

## 2020-03-25 PROCEDURE — 3331090001 HH PPS REVENUE CREDIT

## 2020-03-25 PROCEDURE — 3331090002 HH PPS REVENUE DEBIT

## 2020-03-26 PROCEDURE — 3331090001 HH PPS REVENUE CREDIT

## 2020-03-26 PROCEDURE — A6252 ABSORPT DRG >16 <=48 W/O BDR: HCPCS

## 2020-03-26 PROCEDURE — 3331090002 HH PPS REVENUE DEBIT

## 2020-03-27 ENCOUNTER — HOME CARE VISIT (OUTPATIENT)
Dept: HOME HEALTH SERVICES | Facility: HOME HEALTH | Age: 50
End: 2020-03-27
Payer: MEDICARE

## 2020-03-27 PROCEDURE — 3331090002 HH PPS REVENUE DEBIT

## 2020-03-27 PROCEDURE — 3331090001 HH PPS REVENUE CREDIT

## 2020-03-27 NOTE — PROGRESS NOTES
SN sent a text to patient and visit set up for today between 930-1030 AM, patient was aware of visit time and sent a text back, acknowledging the time. SN arrived to patient's home at 945 AM, SN knocked several times, no answer at the door, SN tried calling patient, no answer, SN sent another text, no answer. Patient is very non-compliant with his own health care. SN sent a text, explaining to patient that he needs to hand wash all h is dirty ace wraps, since he has not gotten them washed in weeks. It was explained we can not get Kerlix currently to wrap his wound and we have to use the ace wraps. Patient has been  very non-compliant with washing the ace wraps so far.    is going to talk to Denise Irvin about discharging patient due to non-compliance issue

## 2020-03-28 PROCEDURE — 3331090001 HH PPS REVENUE CREDIT

## 2020-03-28 PROCEDURE — 3331090002 HH PPS REVENUE DEBIT

## 2020-03-29 PROCEDURE — 3331090001 HH PPS REVENUE CREDIT

## 2020-03-29 PROCEDURE — 3331090002 HH PPS REVENUE DEBIT

## 2020-03-30 ENCOUNTER — HOME CARE VISIT (OUTPATIENT)
Dept: SCHEDULING | Facility: HOME HEALTH | Age: 50
End: 2020-03-30
Payer: MEDICARE

## 2020-03-30 VITALS
SYSTOLIC BLOOD PRESSURE: 177 MMHG | RESPIRATION RATE: 14 BRPM | DIASTOLIC BLOOD PRESSURE: 91 MMHG | OXYGEN SATURATION: 100 % | HEART RATE: 93 BPM | TEMPERATURE: 98 F

## 2020-03-30 PROCEDURE — 3331090002 HH PPS REVENUE DEBIT

## 2020-03-30 PROCEDURE — 3331090001 HH PPS REVENUE CREDIT

## 2020-03-30 PROCEDURE — G0299 HHS/HOSPICE OF RN EA 15 MIN: HCPCS

## 2020-03-30 PROCEDURE — A6252 ABSORPT DRG >16 <=48 W/O BDR: HCPCS

## 2020-03-31 PROCEDURE — 3331090002 HH PPS REVENUE DEBIT

## 2020-03-31 PROCEDURE — 3331090001 HH PPS REVENUE CREDIT

## 2020-04-01 PROCEDURE — 3331090002 HH PPS REVENUE DEBIT

## 2020-04-01 PROCEDURE — 3331090001 HH PPS REVENUE CREDIT

## 2020-04-02 PROCEDURE — 3331090001 HH PPS REVENUE CREDIT

## 2020-04-02 PROCEDURE — 3331090002 HH PPS REVENUE DEBIT

## 2020-04-03 ENCOUNTER — HOME CARE VISIT (OUTPATIENT)
Dept: SCHEDULING | Facility: HOME HEALTH | Age: 50
End: 2020-04-03
Payer: MEDICARE

## 2020-04-03 PROCEDURE — 3331090002 HH PPS REVENUE DEBIT

## 2020-04-03 PROCEDURE — A6260 WOUND CLEANSER ANY TYPE/SIZE: HCPCS

## 2020-04-03 PROCEDURE — G0299 HHS/HOSPICE OF RN EA 15 MIN: HCPCS

## 2020-04-03 PROCEDURE — 3331090001 HH PPS REVENUE CREDIT

## 2020-04-03 PROCEDURE — A6216 NON-STERILE GAUZE<=16 SQ IN: HCPCS

## 2020-04-03 PROCEDURE — A4452 WATERPROOF TAPE: HCPCS

## 2020-04-04 VITALS
RESPIRATION RATE: 14 BRPM | TEMPERATURE: 97.4 F | DIASTOLIC BLOOD PRESSURE: 82 MMHG | SYSTOLIC BLOOD PRESSURE: 163 MMHG | HEART RATE: 83 BPM | OXYGEN SATURATION: 100 %

## 2020-04-04 PROCEDURE — 3331090002 HH PPS REVENUE DEBIT

## 2020-04-04 PROCEDURE — 3331090001 HH PPS REVENUE CREDIT

## 2020-04-05 PROCEDURE — 3331090002 HH PPS REVENUE DEBIT

## 2020-04-05 PROCEDURE — 3331090001 HH PPS REVENUE CREDIT

## 2020-04-06 PROCEDURE — 3331090001 HH PPS REVENUE CREDIT

## 2020-04-06 PROCEDURE — 3331090002 HH PPS REVENUE DEBIT

## 2020-04-07 PROCEDURE — 3331090001 HH PPS REVENUE CREDIT

## 2020-04-07 PROCEDURE — 400014 HH F/U

## 2020-04-07 PROCEDURE — 3331090002 HH PPS REVENUE DEBIT

## 2020-04-08 ENCOUNTER — HOME CARE VISIT (OUTPATIENT)
Dept: HOME HEALTH SERVICES | Facility: HOME HEALTH | Age: 50
End: 2020-04-08
Payer: MEDICARE

## 2020-04-08 PROCEDURE — 3331090001 HH PPS REVENUE CREDIT

## 2020-04-08 PROCEDURE — 3331090002 HH PPS REVENUE DEBIT

## 2020-04-09 PROCEDURE — 3331090002 HH PPS REVENUE DEBIT

## 2020-04-09 PROCEDURE — 3331090001 HH PPS REVENUE CREDIT

## 2020-04-10 ENCOUNTER — HOME CARE VISIT (OUTPATIENT)
Dept: SCHEDULING | Facility: HOME HEALTH | Age: 50
End: 2020-04-10
Payer: MEDICARE

## 2020-04-10 PROCEDURE — 400014 HH F/U

## 2020-04-10 PROCEDURE — 3331090002 HH PPS REVENUE DEBIT

## 2020-04-10 PROCEDURE — G0299 HHS/HOSPICE OF RN EA 15 MIN: HCPCS

## 2020-04-10 PROCEDURE — 3331090001 HH PPS REVENUE CREDIT

## 2020-04-11 PROCEDURE — 3331090001 HH PPS REVENUE CREDIT

## 2020-04-11 PROCEDURE — 3331090002 HH PPS REVENUE DEBIT

## 2020-04-12 VITALS
OXYGEN SATURATION: 97 % | TEMPERATURE: 98.5 F | HEART RATE: 97 BPM | RESPIRATION RATE: 14 BRPM | DIASTOLIC BLOOD PRESSURE: 80 MMHG | SYSTOLIC BLOOD PRESSURE: 180 MMHG

## 2020-04-12 PROCEDURE — 3331090002 HH PPS REVENUE DEBIT

## 2020-04-12 PROCEDURE — 3331090001 HH PPS REVENUE CREDIT

## 2020-04-13 ENCOUNTER — HOME CARE VISIT (OUTPATIENT)
Dept: HOME HEALTH SERVICES | Facility: HOME HEALTH | Age: 50
End: 2020-04-13
Payer: MEDICARE

## 2020-04-13 PROCEDURE — 3331090002 HH PPS REVENUE DEBIT

## 2020-04-13 PROCEDURE — 3331090001 HH PPS REVENUE CREDIT

## 2020-04-13 NOTE — PROGRESS NOTES
SN contacted patient about a SN visit today. Patient stated he was going to [address] today for a MD appt. Patient will perform wound care when he returns home today. Today's SN visit is a missed visit. Dr Yovani Avilse notified of missed visit.

## 2020-04-14 PROCEDURE — 3331090001 HH PPS REVENUE CREDIT

## 2020-04-14 PROCEDURE — 3331090002 HH PPS REVENUE DEBIT

## 2020-04-15 PROCEDURE — 3331090002 HH PPS REVENUE DEBIT

## 2020-04-15 PROCEDURE — 3331090001 HH PPS REVENUE CREDIT

## 2020-04-16 PROCEDURE — 3331090001 HH PPS REVENUE CREDIT

## 2020-04-16 PROCEDURE — 3331090002 HH PPS REVENUE DEBIT

## 2020-04-17 ENCOUNTER — HOME CARE VISIT (OUTPATIENT)
Dept: SCHEDULING | Facility: HOME HEALTH | Age: 50
End: 2020-04-17
Payer: MEDICARE

## 2020-04-17 PROCEDURE — A6446 CONFORM BAND S W>=3" <5"/YD: HCPCS

## 2020-04-17 PROCEDURE — A6252 ABSORPT DRG >16 <=48 W/O BDR: HCPCS

## 2020-04-17 PROCEDURE — A4452 WATERPROOF TAPE: HCPCS

## 2020-04-17 PROCEDURE — 3331090002 HH PPS REVENUE DEBIT

## 2020-04-17 PROCEDURE — G0299 HHS/HOSPICE OF RN EA 15 MIN: HCPCS

## 2020-04-17 PROCEDURE — 3331090001 HH PPS REVENUE CREDIT

## 2020-04-17 PROCEDURE — A6216 NON-STERILE GAUZE<=16 SQ IN: HCPCS

## 2020-04-18 VITALS
HEART RATE: 69 BPM | SYSTOLIC BLOOD PRESSURE: 180 MMHG | DIASTOLIC BLOOD PRESSURE: 100 MMHG | TEMPERATURE: 97.7 F | RESPIRATION RATE: 14 BRPM | OXYGEN SATURATION: 100 %

## 2020-04-18 PROCEDURE — 3331090002 HH PPS REVENUE DEBIT

## 2020-04-18 PROCEDURE — 3331090001 HH PPS REVENUE CREDIT

## 2020-04-19 PROCEDURE — 3331090001 HH PPS REVENUE CREDIT

## 2020-04-19 PROCEDURE — 3331090002 HH PPS REVENUE DEBIT

## 2020-04-20 ENCOUNTER — HOME CARE VISIT (OUTPATIENT)
Dept: HOME HEALTH SERVICES | Facility: HOME HEALTH | Age: 50
End: 2020-04-20
Payer: MEDICARE

## 2020-04-20 ENCOUNTER — HOME CARE VISIT (OUTPATIENT)
Dept: SCHEDULING | Facility: HOME HEALTH | Age: 50
End: 2020-04-20
Payer: MEDICARE

## 2020-04-20 VITALS
DIASTOLIC BLOOD PRESSURE: 102 MMHG | SYSTOLIC BLOOD PRESSURE: 202 MMHG | TEMPERATURE: 98.2 F | HEART RATE: 90 BPM | RESPIRATION RATE: 14 BRPM | OXYGEN SATURATION: 99 %

## 2020-04-20 PROCEDURE — G0299 HHS/HOSPICE OF RN EA 15 MIN: HCPCS

## 2020-04-20 PROCEDURE — 3331090001 HH PPS REVENUE CREDIT

## 2020-04-20 PROCEDURE — 3331090002 HH PPS REVENUE DEBIT

## 2020-04-21 PROCEDURE — 3331090001 HH PPS REVENUE CREDIT

## 2020-04-21 PROCEDURE — 3331090002 HH PPS REVENUE DEBIT

## 2020-04-22 PROCEDURE — 3331090002 HH PPS REVENUE DEBIT

## 2020-04-22 PROCEDURE — 3331090001 HH PPS REVENUE CREDIT

## 2020-04-23 PROCEDURE — 3331090001 HH PPS REVENUE CREDIT

## 2020-04-23 PROCEDURE — 3331090002 HH PPS REVENUE DEBIT

## 2020-04-24 ENCOUNTER — HOME CARE VISIT (OUTPATIENT)
Dept: HOME HEALTH SERVICES | Facility: HOME HEALTH | Age: 50
End: 2020-04-24
Payer: MEDICARE

## 2020-04-24 PROCEDURE — 3331090002 HH PPS REVENUE DEBIT

## 2020-04-24 PROCEDURE — 3331090001 HH PPS REVENUE CREDIT

## 2020-04-25 PROCEDURE — 3331090002 HH PPS REVENUE DEBIT

## 2020-04-25 PROCEDURE — 3331090001 HH PPS REVENUE CREDIT

## 2020-04-26 PROCEDURE — 3331090002 HH PPS REVENUE DEBIT

## 2020-04-26 PROCEDURE — 3331090001 HH PPS REVENUE CREDIT

## 2020-04-27 ENCOUNTER — HOME CARE VISIT (OUTPATIENT)
Dept: SCHEDULING | Facility: HOME HEALTH | Age: 50
End: 2020-04-27
Payer: MEDICARE

## 2020-04-27 ENCOUNTER — HOME CARE VISIT (OUTPATIENT)
Dept: HOME HEALTH SERVICES | Facility: HOME HEALTH | Age: 50
End: 2020-04-27
Payer: MEDICARE

## 2020-04-27 VITALS
SYSTOLIC BLOOD PRESSURE: 193 MMHG | RESPIRATION RATE: 14 BRPM | TEMPERATURE: 97.4 F | OXYGEN SATURATION: 100 % | HEART RATE: 97 BPM | DIASTOLIC BLOOD PRESSURE: 93 MMHG

## 2020-04-27 PROCEDURE — G0299 HHS/HOSPICE OF RN EA 15 MIN: HCPCS

## 2020-04-27 PROCEDURE — 3331090002 HH PPS REVENUE DEBIT

## 2020-04-27 PROCEDURE — 3331090001 HH PPS REVENUE CREDIT

## 2020-04-28 PROCEDURE — 3331090002 HH PPS REVENUE DEBIT

## 2020-04-28 PROCEDURE — 3331090001 HH PPS REVENUE CREDIT

## 2020-04-29 PROCEDURE — 3331090002 HH PPS REVENUE DEBIT

## 2020-04-29 PROCEDURE — 3331090001 HH PPS REVENUE CREDIT

## 2020-04-30 ENCOUNTER — HOME CARE VISIT (OUTPATIENT)
Dept: HOME HEALTH SERVICES | Facility: HOME HEALTH | Age: 50
End: 2020-04-30
Payer: MEDICARE

## 2020-04-30 PROCEDURE — 3331090001 HH PPS REVENUE CREDIT

## 2020-04-30 PROCEDURE — 3331090002 HH PPS REVENUE DEBIT

## 2020-05-01 ENCOUNTER — HOME CARE VISIT (OUTPATIENT)
Dept: HOME HEALTH SERVICES | Facility: HOME HEALTH | Age: 50
End: 2020-05-01
Payer: MEDICARE

## 2020-05-01 PROCEDURE — 3331090001 HH PPS REVENUE CREDIT

## 2020-05-01 PROCEDURE — 3331090002 HH PPS REVENUE DEBIT

## 2020-05-01 NOTE — PROGRESS NOTES
Call placed again today to Dr Javad Paniagua office, regarding needing wound vac care orders for patient to do his wound care. Message left again with the office and no call back received. Patient is to be seen by [facility] tomorrow for wound care.

## 2020-05-01 NOTE — PROGRESS NOTES
Spoke with Jamal Simpson at Dr Tereso Campbell office in [place]. Wd Vac was applied by Dr Shonda Wyatt and will remain on until next MD appointment May 6th. I text patient because he does not answer the phone and unable to leave message. I explained to him that we would not be making any Home Health visits and if he had any problems with the wd vac (excessive drainage for example)  he is to call the MD office. Primary nurse can touch base with Dr Shakeel Mendoza for further orders after Wednesday's visit.

## 2020-05-02 PROCEDURE — 3331090001 HH PPS REVENUE CREDIT

## 2020-05-02 PROCEDURE — 3331090002 HH PPS REVENUE DEBIT

## 2020-05-03 PROCEDURE — 3331090002 HH PPS REVENUE DEBIT

## 2020-05-03 PROCEDURE — 3331090001 HH PPS REVENUE CREDIT

## 2020-05-04 ENCOUNTER — HOME CARE VISIT (OUTPATIENT)
Dept: HOME HEALTH SERVICES | Facility: HOME HEALTH | Age: 50
End: 2020-05-04
Payer: MEDICARE

## 2020-05-04 PROCEDURE — 3331090002 HH PPS REVENUE DEBIT

## 2020-05-04 PROCEDURE — 3331090001 HH PPS REVENUE CREDIT

## 2020-05-04 NOTE — PROGRESS NOTES
Patient is being seen by Dr Ashu Dinero on Wednesday for his wound vac to be changed. The Dr Ashu Dinero is handling his care and on Wednesday, it will be decided if he is going to continue being been by Dr Ashu Dinero or if [address] will take over doing the wound vac. Today's visit is a missed visit. Hamzah alba notified.

## 2020-05-05 PROCEDURE — 3331090001 HH PPS REVENUE CREDIT

## 2020-05-05 PROCEDURE — 3331090002 HH PPS REVENUE DEBIT

## 2020-05-06 PROCEDURE — 3331090002 HH PPS REVENUE DEBIT

## 2020-05-06 PROCEDURE — 3331090001 HH PPS REVENUE CREDIT

## 2020-05-07 ENCOUNTER — HOME CARE VISIT (OUTPATIENT)
Dept: HOME HEALTH SERVICES | Facility: HOME HEALTH | Age: 50
End: 2020-05-07

## 2020-05-08 ENCOUNTER — HOME CARE VISIT (OUTPATIENT)
Dept: HOME HEALTH SERVICES | Facility: HOME HEALTH | Age: 50
End: 2020-05-08

## 2020-05-08 NOTE — PROGRESS NOTES
Calls and texts sent to patient, trying to reach him to see him today. No answer from calls or texts. SN called patient's brother Annita Lr and spoke to him, making sure the patient is okay. Patient's brother stated that the patient is okay, he just spoke to him last night. He let me know that the patient is not currently going to dialysis either. I explained we've been trying to reach him, so has Dr Rick Mcgee office and he is not respond ing to us. Brother is going to reach patient and ask him to call SN.

## 2020-07-27 ENCOUNTER — HOSPITAL ENCOUNTER (OUTPATIENT)
Dept: LAB | Age: 50
Discharge: HOME | End: 2020-07-27
Payer: MEDICARE

## 2020-07-27 PROCEDURE — 82550 ASSAY OF CK (CPK): CPT

## 2020-07-27 PROCEDURE — 85652 RBC SED RATE AUTOMATED: CPT

## 2020-07-27 PROCEDURE — 86140 C-REACTIVE PROTEIN: CPT

## 2020-07-27 PROCEDURE — 85025 COMPLETE CBC W/AUTO DIFF WBC: CPT

## 2020-07-27 PROCEDURE — 80053 COMPREHEN METABOLIC PANEL: CPT

## 2020-07-28 LAB
ALBUMIN SERPL-MCNC: 3 G/DL (ref 3.4–5)
ALBUMIN/GLOB SERPL: 0.6 {RATIO} (ref 0.8–1.7)
ALP SERPL-CCNC: 78 U/L (ref 45–117)
ALT SERPL-CCNC: 15 U/L (ref 16–61)
ANION GAP SERPL CALC-SCNC: 11 MMOL/L (ref 3–18)
AST SERPL-CCNC: 13 U/L (ref 10–38)
BASOPHILS # BLD: 0 K/UL (ref 0–0.1)
BASOPHILS NFR BLD: 0 % (ref 0–2)
BILIRUB SERPL-MCNC: 0.5 MG/DL (ref 0.2–1)
BUN SERPL-MCNC: 81 MG/DL (ref 7–18)
BUN/CREAT SERPL: 7 (ref 12–20)
CALCIUM SERPL-MCNC: 7.9 MG/DL (ref 8.5–10.1)
CHLORIDE SERPL-SCNC: 105 MMOL/L (ref 100–111)
CK SERPL-CCNC: 93 U/L (ref 39–308)
CO2 SERPL-SCNC: 22 MMOL/L (ref 21–32)
CREAT SERPL-MCNC: 11.9 MG/DL (ref 0.6–1.3)
CRP SERPL-MCNC: 0.7 MG/DL (ref 0–0.3)
EOSINOPHIL # BLD: 0.2 K/UL (ref 0–0.4)
EOSINOPHIL NFR BLD: 3 % (ref 0–5)
ERYTHROCYTE [DISTWIDTH] IN BLOOD BY AUTOMATED COUNT: 21.8 % (ref 11.6–14.5)
ERYTHROCYTE [SEDIMENTATION RATE] IN BLOOD: 70 MM/HR (ref 0–20)
FAX TO INFO,FAXT: NORMAL
FAX TO NUMBER,FAXN: NORMAL
GLOBULIN SER CALC-MCNC: 5.3 G/DL (ref 2–4)
GLUCOSE SERPL-MCNC: 70 MG/DL (ref 74–99)
HCT VFR BLD AUTO: 26.7 % (ref 36–48)
HGB BLD-MCNC: 8.5 G/DL (ref 13–16)
LYMPHOCYTES # BLD: 1 K/UL (ref 0.9–3.6)
LYMPHOCYTES NFR BLD: 15 % (ref 21–52)
MCH RBC QN AUTO: 25.1 PG (ref 24–34)
MCHC RBC AUTO-ENTMCNC: 31.8 G/DL (ref 31–37)
MCV RBC AUTO: 79 FL (ref 74–97)
MONOCYTES # BLD: 0.6 K/UL (ref 0.05–1.2)
MONOCYTES NFR BLD: 9 % (ref 3–10)
NEUTS SEG # BLD: 4.8 K/UL (ref 1.8–8)
NEUTS SEG NFR BLD: 73 % (ref 40–73)
PLATELET # BLD AUTO: 167 K/UL (ref 135–420)
POTASSIUM SERPL-SCNC: 4.6 MMOL/L (ref 3.5–5.5)
PROT SERPL-MCNC: 8.3 G/DL (ref 6.4–8.2)
RBC # BLD AUTO: 3.38 M/UL (ref 4.7–5.5)
SODIUM SERPL-SCNC: 138 MMOL/L (ref 136–145)
WBC # BLD AUTO: 6.6 K/UL (ref 4.6–13.2)

## 2020-12-22 ENCOUNTER — APPOINTMENT (OUTPATIENT)
Dept: GENERAL RADIOLOGY | Age: 50
DRG: 304 | End: 2020-12-22
Attending: EMERGENCY MEDICINE
Payer: MEDICARE

## 2020-12-22 ENCOUNTER — HOSPITAL ENCOUNTER (INPATIENT)
Age: 50
LOS: 2 days | Discharge: HOME HEALTH | DRG: 304 | End: 2020-12-24
Attending: EMERGENCY MEDICINE | Admitting: FAMILY MEDICINE
Payer: MEDICARE

## 2020-12-22 ENCOUNTER — APPOINTMENT (OUTPATIENT)
Dept: CT IMAGING | Age: 50
DRG: 304 | End: 2020-12-22
Attending: EMERGENCY MEDICINE
Payer: MEDICARE

## 2020-12-22 DIAGNOSIS — I16.0 HYPERTENSIVE URGENCY: ICD-10-CM

## 2020-12-22 DIAGNOSIS — Z99.2 ESRD ON HEMODIALYSIS (HCC): Primary | ICD-10-CM

## 2020-12-22 DIAGNOSIS — N18.6 ESRD ON HEMODIALYSIS (HCC): Primary | ICD-10-CM

## 2020-12-22 PROBLEM — R11.10 ABDOMINAL PAIN WITH VOMITING: Status: ACTIVE | Noted: 2020-12-22

## 2020-12-22 PROBLEM — R10.9 ABDOMINAL PAIN WITH VOMITING: Status: ACTIVE | Noted: 2020-12-22

## 2020-12-22 PROBLEM — R19.7 DIARRHEA: Status: ACTIVE | Noted: 2020-12-22

## 2020-12-22 LAB
ALBUMIN SERPL-MCNC: 3.3 G/DL (ref 3.4–5)
ALBUMIN/GLOB SERPL: 0.6 {RATIO} (ref 0.8–1.7)
ALP SERPL-CCNC: 71 U/L (ref 45–117)
ALT SERPL-CCNC: 26 U/L (ref 16–61)
ANION GAP SERPL CALC-SCNC: 24 MMOL/L (ref 3–18)
AST SERPL-CCNC: 8 U/L (ref 10–38)
ATRIAL RATE: 95 BPM
BASOPHILS # BLD: 0 K/UL (ref 0–0.1)
BASOPHILS NFR BLD: 0 % (ref 0–2)
BILIRUB SERPL-MCNC: 0.4 MG/DL (ref 0.2–1)
BUN SERPL-MCNC: 170 MG/DL (ref 7–18)
BUN/CREAT SERPL: 8 (ref 12–20)
CALCIUM SERPL-MCNC: 7.5 MG/DL (ref 8.5–10.1)
CALCULATED P AXIS, ECG09: 87 DEGREES
CALCULATED R AXIS, ECG10: -6 DEGREES
CALCULATED T AXIS, ECG11: 79 DEGREES
CHLORIDE SERPL-SCNC: 107 MMOL/L (ref 100–111)
CK MB CFR SERPL CALC: 1.3 % (ref 0–4)
CK MB SERPL-MCNC: 3.5 NG/ML (ref 5–25)
CK SERPL-CCNC: 264 U/L (ref 39–308)
CO2 SERPL-SCNC: 11 MMOL/L (ref 21–32)
CREAT SERPL-MCNC: 21.8 MG/DL (ref 0.6–1.3)
DIAGNOSIS, 93000: NORMAL
DIFFERENTIAL METHOD BLD: ABNORMAL
EOSINOPHIL # BLD: 0 K/UL (ref 0–0.4)
EOSINOPHIL NFR BLD: 1 % (ref 0–5)
ERYTHROCYTE [DISTWIDTH] IN BLOOD BY AUTOMATED COUNT: 17.9 % (ref 11.6–14.5)
GLOBULIN SER CALC-MCNC: 5.5 G/DL (ref 2–4)
GLUCOSE BLD STRIP.AUTO-MCNC: 73 MG/DL (ref 70–110)
GLUCOSE BLD STRIP.AUTO-MCNC: 79 MG/DL (ref 70–110)
GLUCOSE SERPL-MCNC: 75 MG/DL (ref 74–99)
HCT VFR BLD AUTO: 23.1 % (ref 36–48)
HGB BLD-MCNC: 8.2 G/DL (ref 13–16)
LACTATE SERPL-SCNC: 0.6 MMOL/L (ref 0.4–2)
LYMPHOCYTES # BLD: 0.7 K/UL (ref 0.9–3.6)
LYMPHOCYTES NFR BLD: 9 % (ref 21–52)
MAGNESIUM SERPL-MCNC: 3.1 MG/DL (ref 1.6–2.6)
MCH RBC QN AUTO: 26.9 PG (ref 24–34)
MCHC RBC AUTO-ENTMCNC: 35.5 G/DL (ref 31–37)
MCV RBC AUTO: 75.7 FL (ref 74–97)
MONOCYTES # BLD: 0.5 K/UL (ref 0.05–1.2)
MONOCYTES NFR BLD: 7 % (ref 3–10)
NEUTS SEG # BLD: 6.1 K/UL (ref 1.8–8)
NEUTS SEG NFR BLD: 83 % (ref 40–73)
P-R INTERVAL, ECG05: 162 MS
PLATELET # BLD AUTO: 189 K/UL (ref 135–420)
PMV BLD AUTO: 9.5 FL (ref 9.2–11.8)
POTASSIUM SERPL-SCNC: 3.5 MMOL/L (ref 3.5–5.5)
PROT SERPL-MCNC: 8.8 G/DL (ref 6.4–8.2)
Q-T INTERVAL, ECG07: 412 MS
QRS DURATION, ECG06: 90 MS
QTC CALCULATION (BEZET), ECG08: 517 MS
RBC # BLD AUTO: 3.05 M/UL (ref 4.7–5.5)
SODIUM SERPL-SCNC: 142 MMOL/L (ref 136–145)
TROPONIN I SERPL-MCNC: 0.02 NG/ML (ref 0–0.04)
VENTRICULAR RATE, ECG03: 95 BPM
WBC # BLD AUTO: 7.3 K/UL (ref 4.6–13.2)

## 2020-12-22 PROCEDURE — 99285 EMERGENCY DEPT VISIT HI MDM: CPT

## 2020-12-22 PROCEDURE — 74011250636 HC RX REV CODE- 250/636: Performed by: HOSPITALIST

## 2020-12-22 PROCEDURE — 87177 OVA AND PARASITES SMEARS: CPT

## 2020-12-22 PROCEDURE — 74011250637 HC RX REV CODE- 250/637: Performed by: FAMILY MEDICINE

## 2020-12-22 PROCEDURE — 74011636637 HC RX REV CODE- 636/637: Performed by: EMERGENCY MEDICINE

## 2020-12-22 PROCEDURE — 74176 CT ABD & PELVIS W/O CONTRAST: CPT

## 2020-12-22 PROCEDURE — 93005 ELECTROCARDIOGRAM TRACING: CPT

## 2020-12-22 PROCEDURE — 74011250636 HC RX REV CODE- 250/636: Performed by: EMERGENCY MEDICINE

## 2020-12-22 PROCEDURE — 74011250636 HC RX REV CODE- 250/636: Performed by: FAMILY MEDICINE

## 2020-12-22 PROCEDURE — 5A1D70Z PERFORMANCE OF URINARY FILTRATION, INTERMITTENT, LESS THAN 6 HOURS PER DAY: ICD-10-PCS | Performed by: FAMILY MEDICINE

## 2020-12-22 PROCEDURE — 87506 IADNA-DNA/RNA PROBE TQ 6-11: CPT

## 2020-12-22 PROCEDURE — 96375 TX/PRO/DX INJ NEW DRUG ADDON: CPT

## 2020-12-22 PROCEDURE — 85025 COMPLETE CBC W/AUTO DIFF WBC: CPT

## 2020-12-22 PROCEDURE — 82550 ASSAY OF CK (CPK): CPT

## 2020-12-22 PROCEDURE — 77030013140 HC MSK NEB VYRM -A

## 2020-12-22 PROCEDURE — 74011000258 HC RX REV CODE- 258: Performed by: EMERGENCY MEDICINE

## 2020-12-22 PROCEDURE — 83605 ASSAY OF LACTIC ACID: CPT

## 2020-12-22 PROCEDURE — 82962 GLUCOSE BLOOD TEST: CPT

## 2020-12-22 PROCEDURE — 80053 COMPREHEN METABOLIC PANEL: CPT

## 2020-12-22 PROCEDURE — 96376 TX/PRO/DX INJ SAME DRUG ADON: CPT

## 2020-12-22 PROCEDURE — 74011250637 HC RX REV CODE- 250/637: Performed by: EMERGENCY MEDICINE

## 2020-12-22 PROCEDURE — 74011000250 HC RX REV CODE- 250: Performed by: EMERGENCY MEDICINE

## 2020-12-22 PROCEDURE — 71045 X-RAY EXAM CHEST 1 VIEW: CPT

## 2020-12-22 PROCEDURE — 83735 ASSAY OF MAGNESIUM: CPT

## 2020-12-22 PROCEDURE — 77030040361 HC SLV COMPR DVT MDII -B

## 2020-12-22 PROCEDURE — 65270000029 HC RM PRIVATE

## 2020-12-22 PROCEDURE — 96374 THER/PROPH/DIAG INJ IV PUSH: CPT

## 2020-12-22 RX ORDER — PROMETHAZINE HYDROCHLORIDE 25 MG/1
12.5 TABLET ORAL
Status: DISCONTINUED | OUTPATIENT
Start: 2020-12-22 | End: 2020-12-24 | Stop reason: HOSPADM

## 2020-12-22 RX ORDER — HEPARIN SODIUM 1000 [USP'U]/ML
3000 INJECTION, SOLUTION INTRAVENOUS; SUBCUTANEOUS
Status: DISCONTINUED | OUTPATIENT
Start: 2020-12-22 | End: 2020-12-24 | Stop reason: HOSPADM

## 2020-12-22 RX ORDER — SEVELAMER CARBONATE 800 MG/1
800 TABLET, FILM COATED ORAL
Status: DISCONTINUED | OUTPATIENT
Start: 2020-12-22 | End: 2020-12-24 | Stop reason: HOSPADM

## 2020-12-22 RX ORDER — ACETAMINOPHEN 650 MG/1
650 SUPPOSITORY RECTAL
Status: DISCONTINUED | OUTPATIENT
Start: 2020-12-22 | End: 2020-12-24 | Stop reason: HOSPADM

## 2020-12-22 RX ORDER — FAMOTIDINE 20 MG/1
20 TABLET, FILM COATED ORAL DAILY
Status: DISCONTINUED | OUTPATIENT
Start: 2020-12-22 | End: 2020-12-24 | Stop reason: HOSPADM

## 2020-12-22 RX ORDER — CARVEDILOL 12.5 MG/1
25 TABLET ORAL 2 TIMES DAILY WITH MEALS
Status: DISCONTINUED | OUTPATIENT
Start: 2020-12-22 | End: 2020-12-24 | Stop reason: HOSPADM

## 2020-12-22 RX ORDER — HEPARIN SODIUM 5000 [USP'U]/ML
5000 INJECTION, SOLUTION INTRAVENOUS; SUBCUTANEOUS EVERY 8 HOURS
Status: DISCONTINUED | OUTPATIENT
Start: 2020-12-22 | End: 2020-12-24 | Stop reason: HOSPADM

## 2020-12-22 RX ORDER — HYDRALAZINE HYDROCHLORIDE 25 MG/1
25 TABLET, FILM COATED ORAL 3 TIMES DAILY
Status: DISCONTINUED | OUTPATIENT
Start: 2020-12-22 | End: 2020-12-24 | Stop reason: HOSPADM

## 2020-12-22 RX ORDER — CALCIUM GLUCONATE 20 MG/ML
1 INJECTION, SOLUTION INTRAVENOUS ONCE
Status: DISPENSED | OUTPATIENT
Start: 2020-12-22 | End: 2020-12-22

## 2020-12-22 RX ORDER — LABETALOL HCL 20 MG/4 ML
20 SYRINGE (ML) INTRAVENOUS
Status: COMPLETED | OUTPATIENT
Start: 2020-12-22 | End: 2020-12-22

## 2020-12-22 RX ORDER — SODIUM CHLORIDE 0.9 % (FLUSH) 0.9 %
5-40 SYRINGE (ML) INJECTION AS NEEDED
Status: DISCONTINUED | OUTPATIENT
Start: 2020-12-22 | End: 2020-12-24 | Stop reason: HOSPADM

## 2020-12-22 RX ORDER — SODIUM BICARBONATE 650 MG/1
650 TABLET ORAL 3 TIMES DAILY
Status: DISCONTINUED | OUTPATIENT
Start: 2020-12-22 | End: 2020-12-24 | Stop reason: HOSPADM

## 2020-12-22 RX ORDER — ONDANSETRON 2 MG/ML
4 INJECTION INTRAMUSCULAR; INTRAVENOUS
Status: COMPLETED | OUTPATIENT
Start: 2020-12-22 | End: 2020-12-22

## 2020-12-22 RX ORDER — DEXTROSE MONOHYDRATE 100 MG/ML
125-250 INJECTION, SOLUTION INTRAVENOUS AS NEEDED
Status: DISCONTINUED | OUTPATIENT
Start: 2020-12-22 | End: 2020-12-24 | Stop reason: HOSPADM

## 2020-12-22 RX ORDER — AMLODIPINE BESYLATE 5 MG/1
10 TABLET ORAL DAILY
Status: DISCONTINUED | OUTPATIENT
Start: 2020-12-22 | End: 2020-12-24 | Stop reason: HOSPADM

## 2020-12-22 RX ORDER — DOXAZOSIN 4 MG/1
2 TABLET ORAL DAILY
Status: DISCONTINUED | OUTPATIENT
Start: 2020-12-22 | End: 2020-12-24 | Stop reason: HOSPADM

## 2020-12-22 RX ORDER — ALBUTEROL SULFATE 2.5 MG/.5ML
10 SOLUTION RESPIRATORY (INHALATION) ONCE
Status: ACTIVE | OUTPATIENT
Start: 2020-12-22 | End: 2020-12-22

## 2020-12-22 RX ORDER — SODIUM CHLORIDE 0.9 % (FLUSH) 0.9 %
5-40 SYRINGE (ML) INJECTION EVERY 8 HOURS
Status: DISCONTINUED | OUTPATIENT
Start: 2020-12-22 | End: 2020-12-24 | Stop reason: HOSPADM

## 2020-12-22 RX ORDER — CLONIDINE HYDROCHLORIDE 0.1 MG/1
0.2 TABLET ORAL
Status: COMPLETED | OUTPATIENT
Start: 2020-12-22 | End: 2020-12-22

## 2020-12-22 RX ORDER — BISACODYL 5 MG
5 TABLET, DELAYED RELEASE (ENTERIC COATED) ORAL DAILY PRN
Status: DISCONTINUED | OUTPATIENT
Start: 2020-12-22 | End: 2020-12-24 | Stop reason: HOSPADM

## 2020-12-22 RX ORDER — ISOSORBIDE MONONITRATE 60 MG/1
60 TABLET, EXTENDED RELEASE ORAL DAILY
Status: DISCONTINUED | OUTPATIENT
Start: 2020-12-22 | End: 2020-12-24 | Stop reason: HOSPADM

## 2020-12-22 RX ORDER — ACETAMINOPHEN 325 MG/1
650 TABLET ORAL
Status: DISCONTINUED | OUTPATIENT
Start: 2020-12-22 | End: 2020-12-24 | Stop reason: HOSPADM

## 2020-12-22 RX ORDER — ONDANSETRON 2 MG/ML
4 INJECTION INTRAMUSCULAR; INTRAVENOUS
Status: DISCONTINUED | OUTPATIENT
Start: 2020-12-22 | End: 2020-12-24 | Stop reason: HOSPADM

## 2020-12-22 RX ORDER — HYDRALAZINE HYDROCHLORIDE 20 MG/ML
20 INJECTION INTRAMUSCULAR; INTRAVENOUS
Status: COMPLETED | OUTPATIENT
Start: 2020-12-22 | End: 2020-12-22

## 2020-12-22 RX ADMIN — HYDRALAZINE HYDROCHLORIDE 25 MG: 25 TABLET, FILM COATED ORAL at 18:44

## 2020-12-22 RX ADMIN — DOXAZOSIN 2 MG: 4 TABLET ORAL at 09:56

## 2020-12-22 RX ADMIN — HEPARIN SODIUM 5000 UNITS: 5000 INJECTION INTRAVENOUS; SUBCUTANEOUS at 12:34

## 2020-12-22 RX ADMIN — Medication 10 ML: at 22:09

## 2020-12-22 RX ADMIN — ONDANSETRON 4 MG: 2 INJECTION INTRAMUSCULAR; INTRAVENOUS at 01:47

## 2020-12-22 RX ADMIN — Medication 10 ML: at 15:18

## 2020-12-22 RX ADMIN — ONDANSETRON 4 MG: 2 INJECTION INTRAMUSCULAR; INTRAVENOUS at 16:23

## 2020-12-22 RX ADMIN — Medication 10 ML: at 22:08

## 2020-12-22 RX ADMIN — ACETAMINOPHEN 650 MG: 325 TABLET ORAL at 17:42

## 2020-12-22 RX ADMIN — CARVEDILOL 25 MG: 12.5 TABLET, FILM COATED ORAL at 18:43

## 2020-12-22 RX ADMIN — Medication 10 ML: at 07:06

## 2020-12-22 RX ADMIN — SODIUM BICARBONATE 650 MG: 650 TABLET ORAL at 09:57

## 2020-12-22 RX ADMIN — SEVELAMER CARBONATE 800 MG: 800 TABLET, FILM COATED ORAL at 18:45

## 2020-12-22 RX ADMIN — FAMOTIDINE 20 MG: 20 TABLET, FILM COATED ORAL at 09:56

## 2020-12-22 RX ADMIN — HYDRALAZINE HYDROCHLORIDE 20 MG: 20 INJECTION INTRAMUSCULAR; INTRAVENOUS at 04:45

## 2020-12-22 RX ADMIN — LABETALOL HYDROCHLORIDE 20 MG: 5 INJECTION, SOLUTION INTRAVENOUS at 01:47

## 2020-12-22 RX ADMIN — SEVELAMER CARBONATE 800 MG: 800 TABLET, FILM COATED ORAL at 12:34

## 2020-12-22 RX ADMIN — CARVEDILOL 25 MG: 12.5 TABLET, FILM COATED ORAL at 09:57

## 2020-12-22 RX ADMIN — SODIUM BICARBONATE 650 MG: 650 TABLET ORAL at 18:45

## 2020-12-22 RX ADMIN — Medication 10 ML: at 02:00

## 2020-12-22 RX ADMIN — HEPARIN SODIUM 5000 UNITS: 5000 INJECTION INTRAVENOUS; SUBCUTANEOUS at 20:59

## 2020-12-22 RX ADMIN — HYDRALAZINE HYDROCHLORIDE 25 MG: 25 TABLET, FILM COATED ORAL at 21:00

## 2020-12-22 RX ADMIN — CLONIDINE HYDROCHLORIDE 0.2 MG: 0.1 TABLET ORAL at 02:51

## 2020-12-22 RX ADMIN — ONDANSETRON 4 MG: 2 INJECTION INTRAMUSCULAR; INTRAVENOUS at 07:06

## 2020-12-22 RX ADMIN — ISOSORBIDE MONONITRATE 60 MG: 60 TABLET, EXTENDED RELEASE ORAL at 09:58

## 2020-12-22 RX ADMIN — HYDRALAZINE HYDROCHLORIDE 25 MG: 25 TABLET, FILM COATED ORAL at 09:57

## 2020-12-22 RX ADMIN — SODIUM BICARBONATE 650 MG: 650 TABLET ORAL at 21:00

## 2020-12-22 RX ADMIN — AMLODIPINE BESYLATE 10 MG: 5 TABLET ORAL at 09:57

## 2020-12-22 RX ADMIN — LABETALOL HYDROCHLORIDE 20 MG: 5 INJECTION, SOLUTION INTRAVENOUS at 03:19

## 2020-12-22 NOTE — ED NOTES
Pt incontinent of soft brown stool. perineumm and buttocks cleaned and new diaper placed on pt. New pad on bed, clean gown on pt.  Pt to ct

## 2020-12-22 NOTE — ED NOTES
Pt gave verbal consent for his brother to receive medical information about him. Spoke to brother, told the pt was medicated for an elevated BP which is better now, that his potassium level was normal and that some of his other chemistries ( bun and creat) were elevated. Also that pt was medicated for vomiting and is not vomiting now. Explained that pt was still being evaluated so don't know whether he will be admitted or discharged and stated he could call back in an hour for an update if he would like.

## 2020-12-22 NOTE — ED PROVIDER NOTES
EMERGENCY DEPARTMENT HISTORY AND PHYSICAL EXAM    Date: 12/22/2020  Patient Name: Tam Smalls History of Presenting Illness     Chief Complaint   Patient presents with    Vomiting    Diarrhea         History Provided By: Patient    Additional History (Context):   Tam Smalls is a 52 y.o. male with PMHX end-stage renal disease on hemodialysis Tuesday, Thursday and Saturday presents to the emergency department via ambulance C/O nausea, vomiting and diarrhea that started yesterday. Patient reports that he has not had dialysis for 10 days. Last dialysis was last Saturday. Patient states that he was unable to go to dialysis due to transportation. Patient only reporting of burning throat pain from the vomiting. Pt denies chest pain, shortness of breath, abdominal pain and any other sxs or complaints. PCP: Alanis Soares MD    Current Facility-Administered Medications   Medication Dose Route Frequency Provider Last Rate Last Admin    sodium chloride (NS) flush 5-40 mL  5-40 mL IntraVENous Q8H Agustiady-McadamsYuvals, DO   10 mL at 12/22/20 0200    sodium chloride (NS) flush 5-40 mL  5-40 mL IntraVENous PRN Agustiady-Yuval Mcadamss, DO        albuterol CONCENTRATE 2.5mg/0.5 mL neb soln  10 mg Nebulization ONCE Agherbert-Yuval Mcadamss, DO   Stopped at 12/22/20 0225    insulin regular (NOVOLIN R, HUMULIN R) injection 10 Units  10 Units IntraVENous ONCE Agustjaiden-Yuval Mcadamss, DO   Stopped at 12/22/20 6372    dextrose 10% infusion 125-250 mL  125-250 mL IntraVENous PRN Agherbert-Yuval Mcadamss, DO   Stopped at 12/22/20 0218    calcium gluconate 1 gram in sodium chloride (ISO-OSM) 50 mL infusion  1 g IntraVENous ONCE AgYuval Parras, DO   Stopped at 12/22/20 0219     Current Outpatient Medications   Medication Sig Dispense Refill    clindamycin (CLEOCIN) 300 mg capsule Take 300 mg by mouth two (2) times a day.       ciprofloxacin HCl (CIPRO) 500 mg tablet Take 500 mg by mouth two (2) times a day.  doxazosin (CARDURA) 4 mg tablet Take 2 mg by mouth daily.  sodium hypochlorite 0.0125% (DAKINS SOLUTION) Apply 1 mL to affected area two (2) times a week.  omeprazole (PRILOSEC) 40 mg capsule Take 40 mg by mouth daily.  sevelamer carbonate (RENVELA) 800 mg tab tab Take 800 mg by mouth three (3) times daily (with meals).  sodium bicarbonate 650 mg tablet Take 650 mg by mouth three (3) times daily.  cefazolin sodium/dextrose,iso (CEFAZOLIN IN DEXTROSE, ISO-OS,) 2 gram/50 mL pgbk 50 mL by IntraVENous route every Monday. Mwf, 2-2-3 g after HD 50 mL 0    amLODIPine (NORVASC) 10 mg tablet Take 1 Tab by mouth daily. 30 Tab 0    carvedilol (COREG) 25 mg tablet Take 1 Tab by mouth two (2) times daily (with meals). 60 Tab 0    hydrALAZINE (APRESOLINE) 25 mg tablet Take 1 Tab by mouth three (3) times daily. 90 Tab 0    isosorbide mononitrate ER (IMDUR) 60 mg CR tablet Take 1 Tab by mouth daily. 30 Tab 0    furosemide (LASIX) 80 mg tablet Take 1 Tab by mouth two (2) times a day. 61 Tab 0       Past History     Past Medical History:  Past Medical History:   Diagnosis Date    Chronic kidney disease     [facility]     Heart failure ([facility] [facility]     Hypertension        Past Surgical History:  Past Surgical History:   Procedure Laterality Date    HX ORTHOPAEDIC      right BKA 2017       Family History:  No family history on file. Social History:  Social History     Tobacco Use    Smoking status: Former Smoker    Smokeless tobacco: Never Used   Substance Use Topics    Alcohol use: No    Drug use: No       Allergies: Allergies   Allergen Reactions    Penicillins Rash    Vancomycin Other (comments)     kayla syndrome         Review of Systems   Review of Systems   Constitutional: Negative for chills and fever. HENT: Negative for congestion, ear pain, sinus pain and sore throat.     Eyes: Negative for pain and visual disturbance. Respiratory: Negative for cough and shortness of breath. Cardiovascular: Negative for chest pain and leg swelling. Gastrointestinal: Positive for diarrhea, nausea and vomiting. Negative for abdominal pain and constipation. Genitourinary: Negative for dysuria and hematuria. Musculoskeletal: Negative for back pain and neck pain. Skin: Negative for pallor and rash. Neurological: Negative for dizziness, tremors, weakness, light-headedness and headaches. All other systems reviewed and are negative. Physical Exam     Vitals:    12/22/20 0230 12/22/20 0322 12/22/20 0345 12/22/20 0400   BP: (!) 197/74 (!) 189/87 (!) 185/87 (!) 182/84   Pulse: 95 96  94   Resp: 19      Temp:       SpO2: 100% 99% 100% 100%   Weight:       Height:         Physical Exam    Nursing note and vitals reviewed    Constitutional: [de-identified] -American male, no acute distress  Head: Normocephalic, Atraumatic  Eyes: Pupils are equal, round, and reactive to light, EOMI  Neck: Supple, non-tender  Cardiovascular: Regular rate and rhythm, no murmurs, rubs, or gallops, + 2 radial pulses bilaterally  Chest: Normal work of breathing and chest excursion bilaterally  Lungs: Clear to ausculation bilaterally, no wheezes, no rhonchi  Abdomen: Soft, non tender, non distended, normoactive bowel sounds  Back: No evidence of trauma or deformity  Extremities: R AV fistula with good thrill and bruit. R foot amputation.   No streaking erythema, vesicular lesions, ulcerations or bulla  Skin: Warm and dry, normal cap refill  Neuro: Alert and appropriate, CN intact, normal speech, moving all 4 extremities freely and symmetrically  Psychiatric: Normal mood and affect       Diagnostic Study Results     Labs -     Recent Results (from the past 12 hour(s))   CBC WITH AUTOMATED DIFF    Collection Time: 12/22/20  1:45 AM   Result Value Ref Range    WBC 7.3 4.6 - 13.2 K/uL    RBC 3.05 (L) 4.70 - 5.50 M/uL    HGB 8.2 (L) 13.0 - 16.0 g/dL HCT 23.1 (L) 36.0 - 48.0 %    MCV 75.7 74.0 - 97.0 FL    MCH 26.9 24.0 - 34.0 PG    MCHC 35.5 31.0 - 37.0 g/dL    RDW 17.9 (H) 11.6 - 14.5 %    PLATELET 314 953 - 732 K/uL    MPV 9.5 9.2 - 11.8 FL    NEUTROPHILS 83 (H) 40 - 73 %    LYMPHOCYTES 9 (L) 21 - 52 %    MONOCYTES 7 3 - 10 %    EOSINOPHILS 1 0 - 5 %    BASOPHILS 0 0 - 2 %    ABS. NEUTROPHILS 6.1 1.8 - 8.0 K/UL    ABS. LYMPHOCYTES 0.7 (L) 0.9 - 3.6 K/UL    ABS. MONOCYTES 0.5 0.05 - 1.2 K/UL    ABS. EOSINOPHILS 0.0 0.0 - 0.4 K/UL    ABS. BASOPHILS 0.0 0.0 - 0.1 K/UL    DF AUTOMATED     METABOLIC PANEL, COMPREHENSIVE    Collection Time: 12/22/20  1:45 AM   Result Value Ref Range    Sodium 142 136 - 145 mmol/L    Potassium 3.5 3.5 - 5.5 mmol/L    Chloride 107 100 - 111 mmol/L    CO2 11 (L) 21 - 32 mmol/L    Anion gap 24 (H) 3.0 - 18 mmol/L    Glucose 75 74 - 99 mg/dL     (H) 7.0 - 18 MG/DL    Creatinine 21.80 (H) 0.6 - 1.3 MG/DL    BUN/Creatinine ratio 8 (L) 12 - 20      GFR est AA 3 (L) >60 ml/min/1.73m2    GFR est non-AA 2 (L) >60 ml/min/1.73m2    Calcium 7.5 (L) 8.5 - 10.1 MG/DL    Bilirubin, total 0.4 0.2 - 1.0 MG/DL    ALT (SGPT) 26 16 - 61 U/L    AST (SGOT) 8 (L) 10 - 38 U/L    Alk.  phosphatase 71 45 - 117 U/L    Protein, total 8.8 (H) 6.4 - 8.2 g/dL    Albumin 3.3 (L) 3.4 - 5.0 g/dL    Globulin 5.5 (H) 2.0 - 4.0 g/dL    A-G Ratio 0.6 (L) 0.8 - 1.7     MAGNESIUM    Collection Time: 12/22/20  1:45 AM   Result Value Ref Range    Magnesium 3.1 (H) 1.6 - 2.6 mg/dL   CARDIAC PANEL,(CK, CKMB & TROPONIN)    Collection Time: 12/22/20  1:45 AM   Result Value Ref Range    CK - MB 3.5 <3.6 ng/ml    CK-MB Index 1.3 0.0 - 4.0 %     39 - 308 U/L    Troponin-I, QT 0.02 0.0 - 0.045 NG/ML   EKG, 12 LEAD, INITIAL    Collection Time: 12/22/20  1:51 AM   Result Value Ref Range    Ventricular Rate 95 BPM    Atrial Rate 95 BPM    P-R Interval 162 ms    QRS Duration 90 ms    Q-T Interval 412 ms    QTC Calculation (Bezet) 517 ms    Calculated P Axis 87 degrees Calculated R Axis -6 degrees    Calculated T Axis 79 degrees    Diagnosis       Normal sinus rhythm  Possible Left atrial enlargement  Prolonged QT  Abnormal ECG  When compared with ECG of 29-JAN-2020 03:48,  No significant change was found     GLUCOSE, POC    Collection Time: 12/22/20  2:09 AM   Result Value Ref Range    Glucose (POC) 79 70 - 110 mg/dL       Radiologic Studies -   CT ABD PELV WO CONT   Final Result   IMPRESSION:      Descending and sigmoid colon diverticulosis without evidence for diverticulitis. Thickening of the visualized distal esophagus possibly representing esophagitis. Apparent rectal thickening. Consider proctitis. XR CHEST PORT    (Results Pending)     CT Results  (Last 48 hours)               12/22/20 0348  CT ABD PELV WO CONT Final result    Impression:  IMPRESSION:       Descending and sigmoid colon diverticulosis without evidence for diverticulitis. Thickening of the visualized distal esophagus possibly representing esophagitis. Apparent rectal thickening. Consider proctitis. Narrative:  EXAM: CT of the abdomen and pelvis       INDICATION: Pain. COMPARISON: January 29, 2020. TECHNIQUE: Axial CT imaging of the abdomen and pelvis was performed without   intravenous contrast. Multiplanar reformats were generated. Dose reduction   techniques used: automated exposure control, adjustment of the mAs and/or kVp   according to patient size, and iterative reconstruction techniques. Digital   imaging and communications in Medicine (DICOM) format image data are available   to nonaffiliated external healthcare facilities or entities on a secure, media   free, reciprocally searchable basis with patient authorization for at least 12   months after this study. _______________       FINDINGS:       LOWER CHEST: Unremarkable. LIVER, BILIARY: Liver is normal. No biliary dilation. Gallbladder is   unremarkable.        PANCREAS: Normal.       SPLEEN: Normal.       ADRENALS: Normal.       KIDNEYS: Normal.       LYMPH NODES: No enlarged lymph nodes. GASTROINTESTINAL TRACT: There is mild thickening of the visualized distal   esophagus. There is descending and sigmoid colon diverticulosis without evidence   for diverticulitis. The appendix is not confidently seen as a separate   structure. There is rectal thickening. PELVIC ORGANS: Unremarkable. VASCULATURE: Unremarkable. BONES: No acute or aggressive osseous abnormalities identified. OTHER: None.       _______________               CXR Results  (Last 48 hours)    None            Medical Decision Making   I am the first provider for this patient. I reviewed the vital signs, available nursing notes, past medical history, past surgical history, family history and social history. Vital Signs-Reviewed the patient's vital signs. Pulse Oximetry Analysis -100% on room air    Cardiac Monitor:  Rate: 102 bpm  Rhythm: Regular    EKG interpretation: (Preliminary)  1:52 AM   Normal sinus rhythm at 95 bpm.  AZ interval 162 ms. QRS 90 ms. QTc 517 ms. Peak T waves. No acute ST elevation    Records Reviewed: Nursing Notes and Old Medical Records    Provider Notes:   52 y.o. male who presents with nausea, vomiting and diarrhea, end-stage renal disease on hemodialysis, has not had dialysis for 10 days. Exam patient is actively vomiting. He is also very hypertensive. Lungs CTA. Benign abdominal exam.  Patient's exam and history is concerning for metabolic derangements from his missed dialysis, nausea, vomiting and diarrhea. Will avoid IV fluids due to his history of end-stage renal disease. Will give a dose of labetalol for blood pressure control and reassess. Procedures:  Procedures    ED Course:   1:52 AM   Initial assessment performed. The patients presenting problems have been discussed, and they are in agreement with the care plan formulated and outlined with them.   I have encouraged them to ask questions as they arise throughout their visit. 3:05 AM patient's potassium is 3.5. Patient's chest x-ray showing no pulmonary edema and he is saturating 100% on room air. However patient CMP with a bicarb of 11 and anion gap of 24. Patient with a BUN greater than 100. Patient requiring dialysis and admission. Patient's blood pressure has somewhat improved with labetalol to 197/74. We will give another dose of labetalol and oral clonidine. Discussed patient's history, exam, and available diagnostics results with Dr. Marcelo Feng. [address], nephrology, who agree with dialysis in the morning during admission. 5:10 AM  Abdominal CT showing colonic diverticulosis with no diverticulitis. Thickening of the distal esophagus possibly representing esophagitis. Diagnosis and Disposition     3:05 AM  I have spent 35 minutes of critical care time involved in lab review, consultations with specialist, family decision-making, and documentation. During this entire length of time I was immediately available to the patient. Critical Care: The reason for providing this level of medical care for this critically ill patient was due a critical illness that impaired one or more vital organ systems such that there was a high probability of imminent or life threatening deterioration in the patients condition. This care involved high complexity decision making to assess, manipulate, and support vital system functions, to treat this degreee vital organ system failure and to prevent further life threatening deterioration of the patients condition. Core Measures:  For Hospitalized Patients:    1. Hospitalization Decision Time:  The decision to hospitalize the patient was made by Balwinder Dahl DO at 3:06 AM on 12/22/2020    2.  Aspirin: Aspirin was not given because the patient did not present with a stroke at the time of their Emergency Department evaluation    3:04 AM Patient is being admitted to the hospital by Dr. Ryanne Odonnell. The results of their tests and reasons for their admission have been discussed with them and/or available family. They convey agreement and understanding for the need to be admitted and for their admission diagnosis. CONDITIONS ON ADMISSION:  Sepsis is not present at the time of admission. Pneumonia is not present at the time of admission. MRSA is not present at the time of admission. Wound infection is not present at the time of admission. Pressure Ulcer is not present at the time of admission. CLINICAL IMPRESSION:    1. ESRD on hemodialysis ([address]    2. Hypertensive urgency      ____________________________________     Please note that this dictation was completed with [employer], the computer voice recognition software. Quite often unanticipated grammatical, syntax, homophones, and other interpretive errors are inadvertently transcribed by the computer software. Please disregard these errors. Please excuse any errors that have escaped final proofreading.

## 2020-12-22 NOTE — ED NOTES
TRANSFER - OUT REPORT:    Verbal report given to Meg Claremore on Sd Dia.  being transferred to  for routine progression of care       Report consisted of patients Situation, Background, Assessment and   Recommendations(SBAR). Information from the following report(s) SBAR, ED Summary, MAR, Recent Results and Cardiac Rhythm ST was reviewed with the receiving nurse. Lines:   Peripheral IV 12/22/20 Right Hand (Active)        Opportunity for questions and clarification was provided.       Patient transported with:   Monitor  Registered Nurse

## 2020-12-22 NOTE — PROGRESS NOTES
Hospitalist Progress Note    Patient: Bianka Aparicio MRN: [id number]  CSN: [id number]    YOB: 1970  Age: 52 y.o.   Sex: male    DOA: 12/22/2020 LOS:  LOS: 0 days          Chief Complaint:    ESRD      Assessment/Plan     51 yo male came with abd pain and missed dilaysis 10 days    Proctitis on CT scan  Has hx recurring c diff    Uncontrolled HTN  Likely related to missing HD for 10 days   Resumed home meds     ESRD on HD with missed HD -  Dialysis today and in am     Abdominal pain with NVD -improved  Clears PO, advance as tolerated  CT abd/pelvis: possible esophagitis and proctitis  Stool studies    Right BKA    Missing toes left foot    Anemia of CKD    Severe PVD    Continue supportive care    Surgery consult for proctitis    Enteric contact precautions      Disposition :  Patient Active Problem List   Diagnosis Code    Hyponatremia E87.1    Anemia of chronic disease D63.8    Acute on chronic renal insufficiency N28.9, N18.9    Acute exacerbation of CHF (congestive heart failure) ([facility]) I50.9    Iron deficiency anemia D50.9    Other restrictive cardiomyopathy ([facility] [address] I42.5    Dehydration E86.0    ESRD on hemodialysis ([facility] [address] N18.6, Z99.2    Cellulitis and abscess of foot L03.119, L02.619    HTN (hypertension) I10    Acute osteomyelitis ([facility] [address] M86.10    Hx of BKA, right ([facility] [address] Z89.511    Esophagitis K20.90    Hypoglycemia E16.2    Hypertensive urgency I16.0    Abdominal pain with vomiting R10.9, R11.10    Diarrhea R19.7       Subjective:    Doing better  Denies abd pain this am  No diarrhea this am    Denies new issue    No dialysis overnight    States it was transport issue that he missed multiple dialysis sessions        Review of systems:    Constitutional: denies fevers, chills  Respiratory: denies SOB  Cardiovascular: denies chest pain  Gastrointestinal: denies nausea, vomiting, diarrhea      Vital signs/Intake and Output:  Visit Vitals  BP (!) 167/63 (BP 1 Location: Right arm, BP Patient Position: At rest)   Pulse 98   Temp 98.8 °F (37.1 °C)   Resp 18   Ht 5' 6\" (1.676 m)   Wt 64.4 kg (142 lb)   SpO2 100%   BMI 22.92 kg/m²     Current Shift:  No intake/output data recorded. Last three shifts:  No intake/output data recorded. Exam:    General: chronically ill appearing AAM alert, NAD, OX3  CVS:Regular rate and rhythm, no M/R/G, S1/S2 heard, no thrill  Lungs:Clear to auscultation bilaterally, no wheezes, rhonchi, or rales  Abdomen: Soft, Nontender, No distention, Normal Bowel sounds, No hepatomegaly  Extremities: right BKA, left foot with missing toes  Neuro:grossly normal , follows commands  Psych:appropriate                Labs: Results:       Chemistry Recent Labs     12/22/20 0145   GLU 75      K 3.5      CO2 11*   *   CREA 21.80*   CA 7.5*   AGAP 24*   BUCR 8*   AP 71   TP 8.8*   ALB 3.3*   GLOB 5.5*   AGRAT 0.6*      CBC w/Diff Recent Labs     12/22/20 0145   WBC 7.3   RBC 3.05*   HGB 8.2*   HCT 23.1*      GRANS 83*   LYMPH 9*   EOS 1      Cardiac Enzymes Recent Labs     12/22/20 0145      CKND1 1.3      Coagulation No results for input(s): PTP, INR, APTT, INREXT in the last 72 hours. Lipid Panel No results found for: CHOL, CHOLPOCT, CHOLX, CHLST, CHOLV, 407890, HDL, HDLP, LDL, LDLC, DLDLP, 690838, VLDLC, VLDL, TGLX, TRIGL, TRIGP, TGLPOCT, CHHD, CHHDX   BNP No results for input(s): BNPP in the last 72 hours.    Liver Enzymes Recent Labs     12/22/20 0145   TP 8.8*   ALB 3.3*   AP 71      Thyroid Studies No results found for: T4, T3U, TSH, TSHEXT     Procedures/imaging: see electronic medical records for all procedures/Xrays and details which were not copied into this note but were reviewed prior to creation of Sara Sandy MD

## 2020-12-22 NOTE — DIALYSIS
TREATMENT SUMMARY     Patient dialyzed in room 327  Tolerated well without complaint of headache 10/10.  BP meds to be given an pain medication  LUE AVG functioning well without complication of BFR or accessing   BFR  400  DFR  800  500 ml removed via UF with a with a net removal 0 ml  Report given to Astrid Monzon RN with all questions answered     TREATMENT NOTES                                                                                                ACUTE HEMODIALYSIS FLOW SHEET       PATIENT INFORMATION   [address]    []1st Time Acute  []Stat[x] Routine []Urgent []Chronic Unit   []Acute Room [x]Bedside  []ICU/CCU []ER   Isolation Precautions: [x]Dialysis[] Airborne []Contact []Droplet []Reverse    Special Considerations:_______  [] Blood Consent Verified  []N/A   Allergies:[] NKA                 _ _Penicillins, Vancomycin___ Code Status [x]Full Code [] DNR  [] Other_____   Diet: [x] FULL LIQUID [] NPO [] Diabetic   [] Enteral Feeding [] Cardiac Diabetic: [x]Yes []No     [x]Signed Treatment Consent Verified   [x] Time Out/ Safety Check   PRIMARY NURSE REPORT: FIRST INITIAL/ LAST NAME/TITLE  PRE DIALYSIS: Astrid Monzon RN        TIME: 1500   ACCESS   CATHETER ACCESS: [x] N/A  [] RIGHT  [] LEFT  [] IJ  [] SUBCL [] FEM                    [] First use X-ray  [] Tunnel     [] Non-Tunneled      [] No S/S infection  [] Redness [] Drainage  [] Cultured [] Swelling [] Pain                    [] Medical Aseptic [] Prep Dressing Changed                  [] Clotted [] Patent []      Flows: [] Good [] Poor [] Reversed                 If Access Problem Dr. Milena Santo: [] Yes [] No    Date:_____  [] N/A[]   GRAFT/FISTULA ACCESS:  [] N/A  [] RIGHT  [x] LEFT  [x] UE   [] LE       [x] AVG  [] AVF [] BUTTONHOLE    [x] +BRUIT/THRILL [x] MEDICAL ASEPTIC PREP     [x] No S/S infection  [] Redness [] Drainage  [] Cultured [] Swelling [] Pain              If Access Problem Dr. Milena Santo: [] Yes [] No    Date:______ [] N/A   GENERAL ASSESSMENT   LUNGS:  SaO2% ____ [x] Clear [] Coarse [] Crackles [] Wheezing               [] Diminished Location: [] RLL [] LLL [] RUL [] KYLAH    COUGH:  [] Productive  [] Loose[] Dry [x] N/A  RESPIRATIONS: [x] Easy [] Labored    THERAPY: [x] RA   [] NC _____. L/min    Mask: [] NRB [] Venti  _____O2%                  [] Ventilator [] Intubated [] Trach [] BiPap [] CPap [] HI Flow   CARDIAC: [] Regular [] Irregular [] Pericardial Rub [] JVD               Monitored Rhythm:______ [] N/A   EDEMA: [x] None [] Generalized [] Facial [] Pedal [] UE [] LE             [] Pitting [] 1 [] 2 [] 3 [] 4    [] Right [] Left [] Bilateral   SKIN:    [x] Warm [] Hot [] Cold  [x] Dry [] Pale [] Diaphoretic              [] Flushed [] Jaundiced [] Cyanotic [] Rash [] Weeping     LOC:    [x] Alert  Oriented to: [x] Person [x] Place [x] Time             [] Confused [] Lethargic [] Medicated [] Non-responsive    GI/ABDOMEN: [] Flat [] Distended [x] Soft [] Firm [x] Diarrhea [x] Bowel Sounds Present [x] Nausea [x] Vomiting    PAIN: [x] 0 [] 1 [] 2 [] 3 [] 4 [] 5 [] 6 [] 7 [] 8 [] 9 [] 10          Scale 1-10 Action/Follow Up_____   MOBILITY: [] Amb [] Amb/Assist [x] Bed  [] Wheelchair    CURRENT LABS   HBsAg ONLY: Date Drawn: 1/29/2020            [x] Negative [] Positive [] Unknown.      HBsAb: Date Drawn:  1/29/2020           [] Susceptible <10 [x] Immune ?10 [] Unknown   Date of Current Labs:  ATTACHED     EDUCATION   Person Educated: [x] Patient [] Other_________   Knowledge base: [] None [x] Minimal [] Substantial    Barriers to learning  [x] None _______________   Preferred method of learning: [] Written [x] Oral [x] Visual [] Hands on    Topic: [x] Access Care [] S&S of infection [x] Fluid Management   [] K+  [x] Procedural  [] Albumin [] Medications [] Tx Options   [] Transplant [] Diet [] Other    Teaching Tools: [x] Explain [x] Demonstration [] Handout_____ [] Video______  CARE PLAN    [x] Renal Failure (Adult)  Interdisciplinary  · Fluid and electrolytes stabilized  ? Interventions  · Dehydration signs and symptoms (eg: Weight/lab monitoring; vomiting/diarrhea/urine; tenting; mucous membranes; dizziness/lethargy/irritability/confusion; weak pulse; tachycardia; blood pressure; I&O)  · Fluid overload signs and symptoms assessment (eg: Body weight increased; dyspnea; edema; hypertension; respiratory crackles/wheezing; JVD; lab monitoring; mental status changes; I&O)  · Monitor appropriate lab values  · COMPLIANCE WITH PRESCRIBED THERAPY  · ARTERIAL ACCESS SITE ASSESSMENT  · NUTRITION SCREENING  · Vital signs monitoring per assessed patient condition or unit standard  · Cardiac monitoring  · Hydration management  · Intake and output measurement  · Body weight monitoring  · Skin care  · DIALYSIS  · Nutrition Care Process per nutrition screen  · Oral hygiene care every 2 hours  · Pain management     · Outcome   ? [x] Progressing Towards Goal  ? [] Not Progressing Towards Goals  ? [] Goals Met/Resolved  ? [] Goals Not Met/ Resolved        · Patient/ Family Education  ?  Progressing Towards Goals          RO/HEMODIAYLSIS MACHINE SAFETY CHECKS- BEFORE EACH TREATMENT          [] [facility]: Machine Serial #1:  [id number]    Serial #1: [id number]                       [x] [facility]: Machine Serial #2:  [id number]    Serial #2: [id number]        [] [facility]: Machine Serial #3:  [id number]    Serial #[id number]    Alarm Test: [x] Pass  Time__1515______  [x] RO/Machine Log Complete    [x] Extracorporeal circuit Tested for integrity           Dialyzer_[id number]_________   Tubing_[id number]___________    Dialysate: pH__7.4_  Temp.__37___Conductivity: Meter __14__ HD Machine__13.8_   CHLORINE TESTING- BEFORE EACH TREATMENT AND EVERY 4 HOURS   Total Chlorine: [x] Less than 0.1 ppm Time:_____2nd Check Time:______  (If greater than 0.1 ppm from Primary then every 30 minutes from Secondary)   TREATMENT INIATION-WITH DIALYSIS PRECAUTIONS [x] All Connections Secured   [x] Saline Line Double Clamped    [x] Venous Parameters Set [x] Arterial Parameters Set    [x] Prime Given 250 ml     [x] Air Foam Detector Engaged   PRE-TREATMENT   UF Calculations: Wt to lose:_0___ml(+) Oral:_0_ml(+)IV Meds/Fluids/Blood prods_0__ml(+) Prime/Rinse__500_ml(=)Total UF Goal_500___mL   Scale Type:[x] Bed scale [] Sling Scale [] Wheel Chair Scale []  Not Ordered [] [] Unable to obtain pt on stretcher/ bed scale malfunctioning   Tx Initiation Note: RECEIVED REPORT. PATIENT ALERT AND ORIENTED. VITAL SIGNS WNL. NAD. ALL QUESTIONS ANSWERED WITH PATIENT  SAFETY CHECKS COMPLETE. TIME OUT COMPLETE. TREATMENT INITIATED VIA _LUE AVG____. NO CONCERNS NOTED. [x] Time Out/Safety Check  Time:_1528__   INTRADIALYTIC MONITORING  (SEE ATTACHED FLOWSHEET)     POST TREATMENT    Time Medication Dose Volume Route    Initials                                           [facility] Signatures Title Initials Time   STEPHEN CUNHA                Dialyzer cleared: [x] Good [] Fair [] Poor     Blood Processed __64.8_Liters    Net UF Removed __0__mL  Post Tx Access:                  AVF/AVG: Bleeding Stop       Art._8__min Hcaim.__7__min []+bruit/thrill                Catheter: Locking Solution [] Heparin 1 ml/1000 units  [] Normal Saline                                                                               Art._____ ml Chaim._____ml  Post Assessment:              Skin: [x]Warm [x]Dry []Diaphoretic []Flushed [] Pale []Cyanotic            Lungs: [x]Clear []Coarse []Crackles []Wheezing             Cardiac: [x]Regular []Irregular  []Monitored rhythm____ []N/A            Edema: [x]None []General []Facial []Pedal  []UE []LE []RIGHT []LEFT            Pain: []0 []1 []2 []3 []4 []5 []6 []7 []8 []9 [x]10  PAIN MEDICATION AND BP MEDS TO BE GIVEN FOR HA   POST Tx Note:TREATMENT COMPLETED. ALL POSSIBLE BLOOD RETURNED. PT TOLERATED WELL. DE CANULATED ARTERIAL AND VENOUS SITES WITHOUT INCIDENT.  REPORT GIVEN WITH OPPORTUNITY TO ADDRESS ANY CONCERNS OR QUESTTIONS AND PATIENT STAYS ROOM IN BED WITHOUT DISTRESS OR ACUTE DISCOMFORT. BED LOWED, CALL BELL WITHIN REACH .      Primary Nurse Report: First initial/Last name/Title    Post Dialysis:__Ellen Manzanares RN_____         Time:__1850______________    Abbreviations: AVG-arterial venous graft, AVF-arterial venous fistula, IJ-Internal Jugular,  Subcl-Subclavian, Fem-Femoral, Tx-treatment, AP/HR-apical heart rate, DFR-dialysate flow rate, BFR-blood flow rate, AP-arterial pressure, -venous pressure, UF-ultrafiltrate, TMP-transmembrane pressure, Chaim-Venous, Art-Arterial, RO-Reverse Osmosis

## 2020-12-22 NOTE — H&P
History & Physical    Patient: Fide Guillaume MRN: [id number]  CSN: [id number]    YOB: 1970  Age: 52 y.o. Sex: male      DOA: 12/22/2020  Primary Care Provider:  Robin Boyd MD    Assessment/Plan     Accelerated hypertension -  Presenting /105   Likely related to missing HD for 10 days   Nephrology consulted, Dr. Jomar Small will plan HD today     ESRD on HD with missed HD -  Expected K to be elevated, but wnl because of persistent NVD  Dr. Jomar Small plans to do HD today     Abdominal pain with NVD -  CT abd/pelvis: possible esophagitis and proctitis  Irritation and inflammation due to persistent NVD   Will obtain stool studies     DVT Prophylaxis: SCDs    GI Prophylaxis: Pepcid     Patient Active Problem List   Diagnosis Code    Hyponatremia E87.1    Anemia of chronic disease D63.8    Acute on chronic renal insufficiency N28.9, N18.9    Acute exacerbation of CHF (congestive heart failure) (HCC) I50.9    Iron deficiency anemia D50.9    Other restrictive cardiomyopathy ([facility] [address] I42.5    Dehydration E86.0    ESRD on hemodialysis ([facility] [address] N18.6, Z99.2    Cellulitis and abscess of foot L03.119, L02.619    HTN (hypertension) I10    Acute osteomyelitis ([facility] [address] M86.10    Hx of BKA, right ([facility] [address] Z89.511    Esophagitis K20.90    Hypoglycemia E16.2     Estimated length of stay: 3-4 days    CC: nausea/vomiting/diarrhea        HPI:     Fide Guillaume is a 52 y.o. male with past medical h/o end-stage renal disease on hemodialysis Tuesday, Thursday and Saturday presents to the emergency department via ambulance complaining nausea, vomiting and diarrhea that started yesterday. Patient reports that he has not had dialysis for 10 days. Last hemodialysis was last Saturday. Patient states that he was unable to go to dialysis due to transportation. Patient only reporting of burning throat pain from the vomiting.   Pt denies chest pain, shortness of breath, abdominal pain and any other symptoms or complaints. Past Medical History:   Diagnosis Date    Chronic kidney disease     Diabetes ([facility] [address]     Heart failure ([facility] [address]     Hypertension        Past Surgical History:   Procedure Laterality Date    HX ORTHOPAEDIC      right BKA 2017       No family history on file. Social History     Socioeconomic History    Marital status: LEGALLY      Spouse name: Not on file    Number of children: Not on file    Years of education: Not on file    Highest education level: Not on file   Tobacco Use    Smoking status: Former Smoker    Smokeless tobacco: Never Used   Substance and Sexual Activity    Alcohol use: No    Drug use: No       Prior to Admission medications    Medication Sig Start Date End Date Taking? Authorizing Provider   clindamycin (CLEOCIN) 300 mg capsule Take 300 mg by mouth two (2) times a day. Provider, Historical   ciprofloxacin HCl (CIPRO) 500 mg tablet Take 500 mg by mouth two (2) times a day. Provider, Historical   doxazosin (CARDURA) 4 mg tablet Take 2 mg by mouth daily. Provider, Historical   sodium hypochlorite 0.0125% (DAKINS SOLUTION) Apply 1 mL to affected area two (2) times a week. Provider, Historical   omeprazole (PRILOSEC) 40 mg capsule Take 40 mg by mouth daily. Provider, Historical   sevelamer carbonate (RENVELA) 800 mg tab tab Take 800 mg by mouth three (3) times daily (with meals). Provider, Historical   sodium bicarbonate 650 mg tablet Take 650 mg by mouth three (3) times daily. Provider, Historical   cefazolin sodium/dextrose,iso (CEFAZOLIN IN DEXTROSE, ISO-OS,) 2 gram/50 mL pgbk 50 mL by IntraVENous route every Monday. Mwf, 2-2-3 g after HD 2/5/20   Hector Beavers MD   amLODIPine (NORVASC) 10 mg tablet Take 1 Tab by mouth daily. 3/8/18   Hector Beavers MD   carvedilol (COREG) 25 mg tablet Take 1 Tab by mouth two (2) times daily (with meals). 3/7/18   Hector Beavers MD   hydrALAZINE (APRESOLINE) 25 mg tablet Take 1 Tab by mouth three (3) times daily. 3/7/18   Idalia Medina MD   isosorbide mononitrate ER (IMDUR) 60 mg CR tablet Take 1 Tab by mouth daily. 3/8/18   Idalia Medina MD   furosemide (LASIX) 80 mg tablet Take 1 Tab by mouth two (2) times a day. 3/7/18   Idalia Medina MD       Allergies   Allergen Reactions    Penicillins Rash    Vancomycin Other (comments)     kayla syndrome       Review of Systems  Gen: No fever, chills, malaise, weight loss/gain. Heent: No headache, rhinorrhea, epistaxis, ear pain, hearing loss, sinus pain, neck pain/stiffness, sore throat. Heart: No chest pain, palpitations, FU, pnd, or orthopnea. Resp: No cough, hemoptysis, wheezing and shortness of breath. GI: No nausea, vomiting, diarrhea, constipation, melena or hematochezia. : No urinary obstruction, dysuria or hematuria. Derm: No rash, new skin lesion or pruritis. Musc/skeletal: no bone or joint complains. Vasc: No edema, cyanosis or claudication. Endo: No heat/cold intolerance, no polyuria,polydipsia or polyphagia. Neuro: No unilateral weakness, numbness, tingling. No seizures. Heme: No easy bruising or bleeding. Physical Exam:     Physical Exam:  Visit Vitals  BP (!) 182/84   Pulse 94   Temp 98 °F (36.7 °C)   Resp 19   Ht 5' 6\" (1.676 m)   Wt 64.4 kg (142 lb)   SpO2 100%   BMI 22.92 kg/m²      O2 Device: Room air    Temp (24hrs), Av °F (36.7 °C), Min:98 °F (36.7 °C), Max:98 °F (36.7 °C)    No intake/output data recorded. No intake/output data recorded. General:  Awake, cooperative, no distress, ill-appears AAM   Head:  Normocephalic, without obvious abnormality, atraumatic. Eyes:  Conjunctivae/corneas clear, sclera anicteric, PERRL, EOMs intact. Nose: Nares normal. No drainage or sinus tenderness. Throat: Lips, mucosa, and tongue normal.    Neck: Supple, symmetrical, trachea midline, no adenopathy. Lungs:   Clear to auscultation bilaterally. Heart:  Regular rate and rhythm, S1, S2 normal, no murmur, click, rub or gallop.      Abdomen: Soft, non-tender. Bowel sounds normal. No masses,  No organomegaly. Extremities: Extremities normal, atraumatic, no cyanosis or edema. Capillary refill normal.   Pulses: 2+ and symmetric all extremities. Skin: Skin color as per ethnicty, turgor normal. No rashes or lesions   Neurologic: CNII-XII intact. No focal motor or sensory deficit. Labs Reviewed:    Recent Results (from the past 24 hour(s))   CBC WITH AUTOMATED DIFF    Collection Time: 12/22/20  1:45 AM   Result Value Ref Range    WBC 7.3 4.6 - 13.2 K/uL    RBC 3.05 (L) 4.70 - 5.50 M/uL    HGB 8.2 (L) 13.0 - 16.0 g/dL    HCT 23.1 (L) 36.0 - 48.0 %    MCV 75.7 74.0 - 97.0 FL    MCH 26.9 24.0 - 34.0 PG    MCHC 35.5 31.0 - 37.0 g/dL    RDW 17.9 (H) 11.6 - 14.5 %    PLATELET 170 186 - 752 K/uL    MPV 9.5 9.2 - 11.8 FL    NEUTROPHILS 83 (H) 40 - 73 %    LYMPHOCYTES 9 (L) 21 - 52 %    MONOCYTES 7 3 - 10 %    EOSINOPHILS 1 0 - 5 %    BASOPHILS 0 0 - 2 %    ABS. NEUTROPHILS 6.1 1.8 - 8.0 K/UL    ABS. LYMPHOCYTES 0.7 (L) 0.9 - 3.6 K/UL    ABS. MONOCYTES 0.5 0.05 - 1.2 K/UL    ABS. EOSINOPHILS 0.0 0.0 - 0.4 K/UL    ABS. BASOPHILS 0.0 0.0 - 0.1 K/UL    DF AUTOMATED     METABOLIC PANEL, COMPREHENSIVE    Collection Time: 12/22/20  1:45 AM   Result Value Ref Range    Sodium 142 136 - 145 mmol/L    Potassium 3.5 3.5 - 5.5 mmol/L    Chloride 107 100 - 111 mmol/L    CO2 11 (L) 21 - 32 mmol/L    Anion gap 24 (H) 3.0 - 18 mmol/L    Glucose 75 74 - 99 mg/dL     (H) 7.0 - 18 MG/DL    Creatinine 21.80 (H) 0.6 - 1.3 MG/DL    BUN/Creatinine ratio 8 (L) 12 - 20      GFR est AA 3 (L) >60 ml/min/1.73m2    GFR est non-AA 2 (L) >60 ml/min/1.73m2    Calcium 7.5 (L) 8.5 - 10.1 MG/DL    Bilirubin, total 0.4 0.2 - 1.0 MG/DL    ALT (SGPT) 26 16 - 61 U/L    AST (SGOT) 8 (L) 10 - 38 U/L    Alk.  phosphatase 71 45 - 117 U/L    Protein, total 8.8 (H) 6.4 - 8.2 g/dL    Albumin 3.3 (L) 3.4 - 5.0 g/dL    Globulin 5.5 (H) 2.0 - 4.0 g/dL    A-G Ratio 0.6 (L) 0.8 - 1.7     MAGNESIUM Collection Time: 12/22/20  1:45 AM   Result Value Ref Range    Magnesium 3.1 (H) 1.6 - 2.6 mg/dL   CARDIAC PANEL,(CK, CKMB & TROPONIN)    Collection Time: 12/22/20  1:45 AM   Result Value Ref Range    CK - MB 3.5 <3.6 ng/ml    CK-MB Index 1.3 0.0 - 4.0 %     39 - 308 U/L    Troponin-I, QT 0.02 0.0 - 0.045 NG/ML   EKG, 12 LEAD, INITIAL    Collection Time: 12/22/20  1:51 AM   Result Value Ref Range    Ventricular Rate 95 BPM    Atrial Rate 95 BPM    P-R Interval 162 ms    QRS Duration 90 ms    Q-T Interval 412 ms    QTC Calculation (Bezet) 517 ms    Calculated P Axis 87 degrees    Calculated R Axis -6 degrees    Calculated T Axis 79 degrees    Diagnosis       Normal sinus rhythm  Possible Left atrial enlargement  Prolonged QT  Abnormal ECG  When compared with ECG of 29-JAN-2020 03:48,  No significant change was found     GLUCOSE, POC    Collection Time: 12/22/20  2:09 AM   Result Value Ref Range    Glucose (POC) 79 70 - 110 mg/dL       Procedures/imaging: see electronic medical records for all procedures/Xrays and details which were not copied into this note but were reviewed prior to creation of Plan      CC: Violet Pineda MD

## 2020-12-22 NOTE — CONSULTS
RENAL CONSULT  2020    Patient:  Keegan Reed. :  1970  Gender:  male  MRN #:  [id number]    Consulting Physician:  Tameka Otero DO,  Assessment:    )Active Problems:    ESRD on hemodialysis ([address] (2020)      Hypertensive urgency (2020)      Abdominal pain with vomiting (2020)      Diarrhea (2020)      Prostatitis  Nausea and vomiting    Plan:    Need General surgery in put regarding proctitis. HD today and in am.   Lactic acid level  Chronic diarrhea which is worse. Retacrit    History of Present Illness:  Keegan Harrison is a 52y.o. year old male with DM, HTN, CHF who has recurrent C diff while in abx of chronic Osteo who since had chronic diarrhea and needs to take Immodium before HD. He noticed mucoid discharge from rectum for 2-3 days which is new for him. He has has been having vomiting. Not eating much. He missed dialysis. I was asked to see. Past Medical History:   Diagnosis Date    Chronic kidney disease     [facility]     Heart failure ([address]     Hypertension      Past Surgical History:   Procedure Laterality Date    HX ORTHOPAEDIC      right BKA 2017     No family history on file.   Allergies   Allergen Reactions    Penicillins Rash    Vancomycin Other (comments)     kayla syndrome     Current Facility-Administered Medications   Medication Dose Route Frequency Provider Last Rate Last Admin    sodium chloride (NS) flush 5-40 mL  5-40 mL IntraVENous Q8H Edvin Arambula MD   10 mL at 20 0200    sodium chloride (NS) flush 5-40 mL  5-40 mL IntraVENous PRN Edvin Arambula MD        albuterol CONCENTRATE 2.5mg/0.5 mL neb soln  10 mg Nebulization ONCE Sofía Arambula MD   Stopped at 20 022    insulin regular (NOVOLIN R, HUMULIN R) injection 10 Units  10 Units IntraVENous ONCE Sofía Arambula MD   Stopped at 20 021    dextrose 10% infusion 125-250 mL  125-250 mL IntraVENous PRN Jaxon Greenwood MD   Stopped at 12/22/20 0218    calcium gluconate 1 gram in sodium chloride (ISO-OSM) 50 mL infusion  1 g IntraVENous ONCE Yuval Arambula MD   Stopped at 12/22/20 0219    sodium chloride (NS) flush 5-40 mL  5-40 mL IntraVENous Q8H Yuval Arambula MD   10 mL at 12/22/20 0706    sodium chloride (NS) flush 5-40 mL  5-40 mL IntraVENous PRN Yuval Arambula MD        acetaminophen (TYLENOL) tablet 650 mg  650 mg Oral Q6H PRN Yuval Arambula MD        Or    acetaminophen (TYLENOL) suppository 650 mg  650 mg Rectal Q6H PRN Yuval Arambula MD        promethazine (PHENERGAN) tablet 12.5 mg  12.5 mg Oral Q6H PRN Yuval Arambula MD        Or    ondansetron (ZOFRAN) injection 4 mg  4 mg IntraVENous Q6H PRN Edvin Arambula MD   4 mg at 12/22/20 0706    bisacodyL (DULCOLAX) tablet 5 mg  5 mg Oral DAILY PRN Yuval Arambula MD        famotidine (PEPCID) tablet 20 mg  20 mg Oral DAILY Edvin Arambula MD   20 mg at 12/22/20 0956    amLODIPine (NORVASC) tablet 10 mg  10 mg Oral DAILY Edvin Arambula MD   10 mg at 12/22/20 0957    carvediloL (COREG) tablet 25 mg  25 mg Oral BID WITH MEALS Edvin Arambula MD   25 mg at 12/22/20 0957    doxazosin (CARDURA) tablet 2 mg  2 mg Oral DAILY Edvin Arambula MD   2 mg at 12/22/20 2026    hydrALAZINE (APRESOLINE) tablet 25 mg  25 mg Oral TID Yuval Arambula MD   25 mg at 12/22/20 0957    isosorbide mononitrate ER (IMDUR) tablet 60 mg  60 mg Oral DAILY Yuval Arambula MD   60 mg at 12/22/20 0958    sevelamer carbonate (RENVELA) tab 800 mg  800 mg Oral TID WITH MEALS Yuval Arambula MD        sodium bicarbonate tablet 650 mg  650 mg Oral TID Yuval Arambula MD   650 mg at 12/22/20 0957       Review of Symptoms:  Below symptoms negative if not in [place].   Consitutional Symptoms: Fever, weight loss, weight gain, fatigue  Eyes: pain, sudden vision loss, blurry vision, double vision             bleeding nose, sore throat, hoarseness  GI: nausea, vomiting, diarrhea, pain, blood in stool  Pulmonary; Cough, Shortness of breath, Wheezing's  Cardiac: chest pain, palpitation  Musculoskeletal: difficulty walking, falls, pain over muscle, joint pain, weakness  : anuric  Neurologia: dizziness, syncope, focal weakness, difficulty speaking  Integumentary: rash, redness, ulcer   Psychiatric:  Depression suicidal ideation    Objective:  Visit Vitals  BP (!) 143/59   Pulse 96   Temp 97.7 °F (36.5 °C)   Resp 18   Ht 5' 6\" (1.676 m)   Wt 64.4 kg (142 lb)   SpO2 100%   BMI 22.92 kg/m²       Appears chronically ill  No edema  Dry mucus membranes  Decreased skin turger            Laboratory Data:  Lab Results   Component Value Date     (H) 12/22/2020    BUN 81 (H) 07/27/2020    BUN 29 (H) 02/05/2020     12/22/2020     07/27/2020     02/05/2020    CO2 11 (L) 12/22/2020    CO2 22 07/27/2020    CO2 26 02/05/2020     Lab Results   Component Value Date    WBC 7.3 12/22/2020    HGB 8.2 (L) 12/22/2020    HCT 23.1 (L) 12/22/2020       Imaging have been reviewed:  )Ct Abd Pelv Wo Cont    Result Date: 12/22/2020  EXAM: CT of the abdomen and pelvis INDICATION: Pain. COMPARISON: January 29, 2020. TECHNIQUE: Axial CT imaging of the abdomen and pelvis was performed without intravenous contrast. Multiplanar reformats were generated. Dose reduction techniques used: automated exposure control, adjustment of the mAs and/or kVp according to patient size, and iterative reconstruction techniques. Digital imaging and communications in Medicine (DICOM) format image data are available to nonaffiliated external healthcare facilities or entities on a secure, media free, reciprocally searchable basis with patient authorization for at least 12 months after this study. _______________ FINDINGS: LOWER CHEST: Unremarkable.  LIVER, BILIARY: Liver is normal. No biliary dilation. Gallbladder is unremarkable. PANCREAS: Normal. SPLEEN: Normal. ADRENALS: Normal. KIDNEYS: Normal. LYMPH NODES: No enlarged lymph nodes. GASTROINTESTINAL TRACT: There is mild thickening of the visualized distal esophagus. There is descending and sigmoid colon diverticulosis without evidence for diverticulitis. The appendix is not confidently seen as a separate structure. There is rectal thickening. PELVIC ORGANS: Unremarkable. VASCULATURE: Unremarkable. BONES: No acute or aggressive osseous abnormalities identified. OTHER: None. _______________     IMPRESSION: Descending and sigmoid colon diverticulosis without evidence for diverticulitis. Thickening of the visualized distal esophagus possibly representing esophagitis. Apparent rectal thickening. Consider proctitis. Xr Chest Port    Result Date: 12/22/2020  EXAM: XR CHEST PORT CLINICAL INDICATION/HISTORY: HTN, missed dialysis -Additional: None COMPARISON: 1/29/2020 TECHNIQUE: Portable frontal view of the chest _______________ FINDINGS: SUPPORT DEVICES: None. HEART AND MEDIASTINUM: Cardiomediastinal silhouette within normal limits. LUNGS AND PLEURAL SPACES: No dense consolidation, large effusion or pneumothorax. _______________     IMPRESSION: No acute cardiopulmonary abnormality. 65 minutes of critical care time spent in the direct evaluation and treatment of this high risk patient. The reason for providing this level of medical care for this critically ill patient was due a critical illness that impaired one or more vital organ systems such that there was a high probability of imminent or life threatening deterioration in the patients condition.  This care involved high complexity decision making to assess, manipulate, and support vital system functions, to treat this degreee vital organ system failure and to prevent further life threatening deterioration of the patients condition.   d/w with ED attending yesterday  )Joseph Brewster DO,

## 2020-12-22 NOTE — PROGRESS NOTES
Reason for Admission:   nausea/vomiting/diarrhea                   RUR Score:   Mod; 19%               PCP: First and Last name:     Name of Practice:    Are you a current patient: Yes/No:    Approximate date of last visit:    Can you participate in a virtual visit if needed:     Do you (patient/family) have any concerns for transition/discharge? Plan for utilizing home health:   TBD    Current Advanced Directive/Advance Care Plan:              Transition of Care Plan: [address] and physician follow up      Chart reviewed. Per H&P \"Danny Edouard. is a 52 y.o. male with past medical h/o end-stage renal disease on hemodialysis Tuesday, Thursday and Saturday presents to the emergency department via ambulance complaining nausea, vomiting and diarrhea that started yesterday. Patient reports that he has not had dialysis for 10 days.  Last hemodialysis was last Saturday.  Patient states that he was unable to go to dialysis due to transportation.  Patient only reporting of burning throat pain from the vomiting.  Pt denies chest pain, shortness of breath, abdominal pain and any other symptoms or complaints. \"    Please encourage ambulation or order therapy services as appropriate to assist with identifying a transition of care. Per previous CM documentation. \"Pt goes to [facility] in [place] ([phone]) TTS at  11:45. \"  CM to continue to follow and assist.       1330:  CM met with pt at bedside to discuss transition of care. Pt lives alone. Pt has a rollator and a right LE prothesis. CM discussed transition of care recommendation for [facility]. Pt is agreeable. Pt would like to use [facility]. CMS has been notified to assist.  CM has been notified [facility] is not able to see this pt. Pt has been made aware and would like to use [address]. CMS has been notified to assist.  Anticipate pt will transition home with in the next 24-48 hours with [facility] and physician follow up.       Care Management Interventions  Transition of Care Consult (CM Consult): [address]: Yes  Health Maintenance Reviewed: Yes  Current Support Network: Family Lives [place], Lives Alone  The Plan for Transition of Care is Related to the Following Treatment Goals : SIGIFREDO GARRIDO [facility] and physician follow up   The Patient and/or Patient Representative was Provided with a Choice of Provider and Agrees with the Discharge Plan?: Yes  Name of the Patient Representative Who was Provided with a Choice of Provider and Agrees with the Discharge Plan: pt  Freedom of Choice List was Provided with Basic Dialogue that Supports the Patient's Individualized Plan of Care/Goals, Treatment Preferences and Shares the Quality Data Associated with the Providers?: Yes  Discharge Location  Discharge Placement: Home with home health

## 2020-12-22 NOTE — PROGRESS NOTES
9107  Bedside and verbal shift change report given to Gypsy Guevara RN (on coming nurse) by Thalia Higuera RN (off going nurse). Report included the following information SBAR, Kardex, OR Summary, Intake/Output and MAR.    0957  Pt is very nauseous even after Zofran on 0706    1209  Tele box #[id number] attached. 1312  Stool sample sent to lab.    1749  Pt vomits clear red emesis 10 mL, stating that it is blood. Pt is on hemodialysis w/ a nurse. 1848  Pt's dinner tray has red jello. Pt's held meds due to hemodialysis, all given right after hemodialysis. 2003  Bedside and verbal shift change report given by NAHEED Luna (off going nurse) to ANGÉLICA Shultz RN(on coming nurse). Report included the following information SBAR, Kardex, OR Summary, Intake/Output and MAR.

## 2020-12-22 NOTE — PROGRESS NOTES
Patient admitted with ESRD with n/v and diarrhea. Patient stated had not received dialysis for 10 days due to lack of transportation to dialysis facility. Patient A&O x 4, denies pain or discomfort. BP elevated with hx of HTN and without dialysis. Left heel wound with wound care consulted. Right BKA with prosthetic. Admission database completed. 4419 - Bedside and Verbal shift change report given to NAHEED Luna (oncoming nurse) by Eros Hernandez (offgoing nurse). Report included the following information SBAR, Kardex, Intake/Output and MAR.

## 2020-12-23 LAB
ANION GAP SERPL CALC-SCNC: 14 MMOL/L (ref 3–18)
BUN SERPL-MCNC: 68 MG/DL (ref 7–18)
BUN/CREAT SERPL: 6 (ref 12–20)
C DIFF GDH STL QL: POSITIVE
C DIFF TOX A+B STL QL IA: POSITIVE
CALCIUM SERPL-MCNC: 7.5 MG/DL (ref 8.5–10.1)
CAMPYLOBACTER SPECIES, DNA: NEGATIVE
CHLORIDE SERPL-SCNC: 101 MMOL/L (ref 100–111)
CO2 SERPL-SCNC: 26 MMOL/L (ref 21–32)
CREAT SERPL-MCNC: 11.3 MG/DL (ref 0.6–1.3)
ENTEROTOXIGEN E COLI, DNA: NEGATIVE
ERYTHROCYTE [DISTWIDTH] IN BLOOD BY AUTOMATED COUNT: 17.2 % (ref 11.6–14.5)
GLUCOSE SERPL-MCNC: 67 MG/DL (ref 74–99)
HCT VFR BLD AUTO: 21.7 % (ref 36–48)
HGB BLD-MCNC: 7.8 G/DL (ref 13–16)
INTERPRETATION: ABNORMAL
MCH RBC QN AUTO: 27.3 PG (ref 24–34)
MCHC RBC AUTO-ENTMCNC: 35.9 G/DL (ref 31–37)
MCV RBC AUTO: 75.9 FL (ref 74–97)
P SHIGELLOIDES DNA STL QL NAA+PROBE: NEGATIVE
PLATELET # BLD AUTO: 181 K/UL (ref 135–420)
PMV BLD AUTO: 9 FL (ref 9.2–11.8)
POTASSIUM SERPL-SCNC: 2.6 MMOL/L (ref 3.5–5.5)
RBC # BLD AUTO: 2.86 M/UL (ref 4.7–5.5)
SALMONELLA SPECIES, DNA: NEGATIVE
SHIGA TOXIN PRODUCING, DNA: NEGATIVE
SHIGELLA SP+EIEC IPAH STL QL NAA+PROBE: NEGATIVE
SODIUM SERPL-SCNC: 141 MMOL/L (ref 136–145)
VIBRIO SPECIES, DNA: NEGATIVE
WBC # BLD AUTO: 4.9 K/UL (ref 4.6–13.2)
Y. ENTEROCOLITICA, DNA: NEGATIVE

## 2020-12-23 PROCEDURE — 87324 CLOSTRIDIUM AG IA: CPT

## 2020-12-23 PROCEDURE — 80048 BASIC METABOLIC PNL TOTAL CA: CPT

## 2020-12-23 PROCEDURE — 80074 ACUTE HEPATITIS PANEL: CPT

## 2020-12-23 PROCEDURE — 85027 COMPLETE CBC AUTOMATED: CPT

## 2020-12-23 PROCEDURE — 74011250637 HC RX REV CODE- 250/637: Performed by: FAMILY MEDICINE

## 2020-12-23 PROCEDURE — 74011250637 HC RX REV CODE- 250/637: Performed by: HOSPITALIST

## 2020-12-23 PROCEDURE — 74011250636 HC RX REV CODE- 250/636: Performed by: HOSPITALIST

## 2020-12-23 PROCEDURE — 74011250636 HC RX REV CODE- 250/636: Performed by: INTERNAL MEDICINE

## 2020-12-23 PROCEDURE — 90935 HEMODIALYSIS ONE EVALUATION: CPT

## 2020-12-23 PROCEDURE — 36415 COLL VENOUS BLD VENIPUNCTURE: CPT

## 2020-12-23 PROCEDURE — 74011000258 HC RX REV CODE- 258: Performed by: INTERNAL MEDICINE

## 2020-12-23 PROCEDURE — P9047 ALBUMIN (HUMAN), 25%, 50ML: HCPCS | Performed by: HOSPITALIST

## 2020-12-23 PROCEDURE — 65270000029 HC RM PRIVATE

## 2020-12-23 PROCEDURE — 74011250636 HC RX REV CODE- 250/636: Performed by: FAMILY MEDICINE

## 2020-12-23 RX ORDER — MORPHINE SULFATE 2 MG/ML
2 INJECTION, SOLUTION INTRAMUSCULAR; INTRAVENOUS
Status: DISCONTINUED | OUTPATIENT
Start: 2020-12-23 | End: 2020-12-24 | Stop reason: HOSPADM

## 2020-12-23 RX ORDER — ALBUMIN HUMAN 250 G/1000ML
12.5 SOLUTION INTRAVENOUS EVERY 6 HOURS
Status: DISPENSED | OUTPATIENT
Start: 2020-12-23 | End: 2020-12-24

## 2020-12-23 RX ORDER — POTASSIUM CHLORIDE 20 MEQ/1
40 TABLET, EXTENDED RELEASE ORAL
Status: COMPLETED | OUTPATIENT
Start: 2020-12-23 | End: 2020-12-23

## 2020-12-23 RX ADMIN — HEPARIN SODIUM 5000 UNITS: 5000 INJECTION INTRAVENOUS; SUBCUTANEOUS at 05:06

## 2020-12-23 RX ADMIN — POTASSIUM BICARBONATE 40 MEQ: 782 TABLET, EFFERVESCENT ORAL at 07:00

## 2020-12-23 RX ADMIN — SODIUM BICARBONATE 650 MG: 650 TABLET ORAL at 21:48

## 2020-12-23 RX ADMIN — HYDRALAZINE HYDROCHLORIDE 25 MG: 25 TABLET, FILM COATED ORAL at 21:48

## 2020-12-23 RX ADMIN — Medication 10 ML: at 21:48

## 2020-12-23 RX ADMIN — Medication 10 ML: at 13:23

## 2020-12-23 RX ADMIN — SODIUM BICARBONATE 650 MG: 650 TABLET ORAL at 08:53

## 2020-12-23 RX ADMIN — PROMETHAZINE HYDROCHLORIDE 12.5 MG: 25 INJECTION INTRAMUSCULAR; INTRAVENOUS at 15:26

## 2020-12-23 RX ADMIN — ALBUMIN (HUMAN) 12.5 G: 0.25 INJECTION, SOLUTION INTRAVENOUS at 18:19

## 2020-12-23 RX ADMIN — ONDANSETRON 4 MG: 2 INJECTION INTRAMUSCULAR; INTRAVENOUS at 11:20

## 2020-12-23 RX ADMIN — MORPHINE SULFATE 2 MG: 2 INJECTION, SOLUTION INTRAMUSCULAR; INTRAVENOUS at 13:11

## 2020-12-23 RX ADMIN — ACETAMINOPHEN 650 MG: 325 TABLET ORAL at 11:20

## 2020-12-23 RX ADMIN — HEPARIN SODIUM 5000 UNITS: 5000 INJECTION INTRAVENOUS; SUBCUTANEOUS at 11:19

## 2020-12-23 RX ADMIN — Medication 10 ML: at 13:22

## 2020-12-23 RX ADMIN — DOXAZOSIN 2 MG: 4 TABLET ORAL at 08:53

## 2020-12-23 RX ADMIN — Medication 10 ML: at 05:10

## 2020-12-23 RX ADMIN — AMLODIPINE BESYLATE 10 MG: 5 TABLET ORAL at 08:54

## 2020-12-23 RX ADMIN — CARVEDILOL 25 MG: 12.5 TABLET, FILM COATED ORAL at 08:53

## 2020-12-23 RX ADMIN — ISOSORBIDE MONONITRATE 60 MG: 60 TABLET, EXTENDED RELEASE ORAL at 08:53

## 2020-12-23 RX ADMIN — HEPARIN SODIUM 5000 UNITS: 5000 INJECTION INTRAVENOUS; SUBCUTANEOUS at 20:30

## 2020-12-23 RX ADMIN — POTASSIUM CHLORIDE 40 MEQ: 1500 TABLET, EXTENDED RELEASE ORAL at 08:53

## 2020-12-23 RX ADMIN — FIDAXOMICIN 200 MG: 200 TABLET, FILM COATED ORAL at 21:30

## 2020-12-23 RX ADMIN — SODIUM BICARBONATE 650 MG: 650 TABLET ORAL at 15:25

## 2020-12-23 RX ADMIN — SEVELAMER CARBONATE 800 MG: 800 TABLET, FILM COATED ORAL at 18:18

## 2020-12-23 RX ADMIN — SEVELAMER CARBONATE 800 MG: 800 TABLET, FILM COATED ORAL at 08:53

## 2020-12-23 RX ADMIN — FAMOTIDINE 20 MG: 20 TABLET, FILM COATED ORAL at 15:24

## 2020-12-23 RX ADMIN — HYDRALAZINE HYDROCHLORIDE 25 MG: 25 TABLET, FILM COATED ORAL at 08:54

## 2020-12-23 RX ADMIN — SEVELAMER CARBONATE 800 MG: 800 TABLET, FILM COATED ORAL at 11:20

## 2020-12-23 RX ADMIN — CARVEDILOL 25 MG: 12.5 TABLET, FILM COATED ORAL at 18:18

## 2020-12-23 RX ADMIN — FIDAXOMICIN 200 MG: 200 TABLET, FILM COATED ORAL at 13:00

## 2020-12-23 NOTE — PROGRESS NOTES
Problem: Falls - Risk of  Goal: *Absence of Falls  Description: Document Maggie Bhatt Fall Risk and appropriate interventions in the flowsheet. Outcome: Progressing Towards Goal  Note: Fall Risk Interventions:  Mobility Interventions: PT Consult for mobility concerns, Patient to call before getting OOB         Medication Interventions: Patient to call before getting OOB    Elimination Interventions: Toileting schedule/hourly rounds, Patient to call for help with toileting needs              Problem: Risk for Spread of Infection  Goal: Prevent transmission of infectious organism to others  Description: Prevent the transmission of infectious organisms to other patients, staff members, and visitors. Outcome: Progressing Towards Goal     Problem: Chronic Renal Failure  Goal: *Fluid and electrolytes stabilized  Outcome: Progressing Towards Goal     Problem: Pressure Injury - Risk of  Goal: *Prevention of pressure injury  Description: Document Tucker Scale and appropriate interventions in the flowsheet.   Outcome: Progressing Towards Goal  Note: Pressure Injury Interventions:  Sensory Interventions: Assess changes in LOC    Moisture Interventions: Absorbent underpads    Activity Interventions: Pressure redistribution bed/mattress(bed type)    Mobility Interventions: PT/OT evaluation, Pressure redistribution bed/mattress (bed type)    Nutrition Interventions: Document food/fluid/supplement intake, Discuss nutritional consult with provider, Offer support with meals,snacks and hydration    Friction and Shear Interventions: Minimize layers

## 2020-12-23 NOTE — WOUND CARE
IP WOUND CONSULT Perry Bullard. MEDICAL RECORD NUMBER:  [id number] AGE: 52 y.o. GENDER: male  : 1970 TODAY'S DATE:  2020 GENERAL  
 
[] Follow-up [x] New Consult Perry Bullard. is a 52 y.o. male referred by:  
[x] Physician 
[] Nursing 
[] Other: PAST MEDICAL HISTORY Past Medical History:  
Diagnosis Date  Chronic kidney disease  Diabetes ([facility]  Heart failure ([facility]  Hypertension PAST SURGICAL HISTORY Past Surgical History:  
Procedure Laterality Date  HX ORTHOPAEDIC    
 right BKA  FAMILY HISTORY No family history on file. SOCIAL HISTORY Social History Tobacco Use  Smoking status: Former Smoker  Smokeless tobacco: Never Used Substance Use Topics  Alcohol use: No  
 Drug use: No  
 
 
ALLERGIES Allergies Allergen Reactions  Penicillins Rash  Vancomycin Other (comments)  
  kayla syndrome MEDICATIONS No current facility-administered medications on file prior to encounter. Current Outpatient Medications on File Prior to Encounter Medication Sig Dispense Refill  doxazosin (CARDURA) 4 mg tablet Take 2 mg by mouth daily.  sevelamer carbonate (RENVELA) 800 mg tab tab Take 800 mg by mouth three (3) times daily (with meals).  amLODIPine (NORVASC) 10 mg tablet Take 1 Tab by mouth daily. 30 Tab 0  carvedilol (COREG) 25 mg tablet Take 1 Tab by mouth two (2) times daily (with meals). 60 Tab 0  
 hydrALAZINE (APRESOLINE) 25 mg tablet Take 1 Tab by mouth three (3) times daily. 90 Tab 0  
 clindamycin (CLEOCIN) 300 mg capsule Take 300 mg by mouth two (2) times a day.  ciprofloxacin HCl (CIPRO) 500 mg tablet Take 500 mg by mouth two (2) times a day.  sodium hypochlorite 0.0125% (DAKINS SOLUTION) Apply 1 mL to affected area two (2) times a week.  omeprazole (PRILOSEC) 40 mg capsule Take 40 mg by mouth daily.  sodium bicarbonate 650 mg tablet Take 650 mg by mouth three (3) times daily.  cefazolin sodium/dextrose,iso (CEFAZOLIN IN DEXTROSE, ISO-OS,) 2 gram/50 mL pgbk 50 mL by IntraVENous route every Monday. Mwf, 2-2-3 g after HD 50 mL 0  
 isosorbide mononitrate ER (IMDUR) 60 mg CR tablet Take 1 Tab by mouth daily. 30 Tab 0  
 furosemide (LASIX) 80 mg tablet Take 1 Tab by mouth two (2) times a day. 60 Tab 0 Wt Readings from Last 3 Encounters:  
12/23/20 80.8 kg (178 lb 1.6 oz) 02/04/20 86.2 kg (190 lb)  
01/29/20 83.9 kg (185 lb) [unfilled] Visit Vitals BP (!) 96/55 Pulse 86 Temp 97.9 °F (36.6 °C) (Oral) Resp 18 Ht 5' 6\" (1.676 m) Wt 80.8 kg (178 lb 1.6 oz) SpO2 100% BMI 28.75 kg/m² ASSESSMENT Ulcer Identification: 
Ulcer Type: diabetic Contributing Factors: diabetes and chronic pressure Wound Heel Left (Active) Wound Image   12/23/20 1207 Wound Etiology Diabetic [place] 1 12/23/20 1207 Dressing Status Intact 12/23/20 1207 Cleansed Cleansed with saline 12/23/20 1207 Dressing/Treatment Hydrofiber Ag; Ace wrap 12/23/20 1207 Offloading for Diabetic Foot Ulcers Other (comment) 12/23/20 1207 Dressing Change Due 12/30/20 12/23/20 1207 Wound Length (cm) 2 cm 12/23/20 1207 Wound Width (cm) 2 cm 12/23/20 1207 Wound Depth (cm) 0.2 cm 12/23/20 1207 Wound Surface Area (cm^2) 4 cm^2 12/23/20 1207 Wound Volume (cm^3) 0.8 cm^3 12/23/20 1207 Wound Assessment Pale granulation tissue 12/23/20 1207 Drainage Amount Moderate 12/23/20 1207 Drainage Description Serosanguinous 12/23/20 1207 Wound Odor Mild 12/23/20 1207 Elvira-Wound/Incision Assessment Hyperkeratosis (Callous) 12/23/20 1207 Edges Epibole (rolled edges) 12/23/20 1207 Wound Thickness Description Full thickness 12/23/20 1207 Number of days: 328 Wound Foot Left;Posterior 02/17/20 (Active) Number of days: 310  
   
 [REMOVED] Wound Heel Left round [place] diabetic ulcer noted  01/29/20 (Removed) Number of days: 40 PLAN Skin Care & Pressure Relief Recommendations Minimize layers of linen Pads under patient to optimize support surface Turn/reposition approximately every 2 hours Offload heels pillows Offloading boots Physician/Provider notified: Yes Recommendations: Outpatient follow up at [facility] wound clinic for callous debridement by poditary and wound care management, discussed with pt who is agreeable Teaching completed with:  
[x] Patient          
[] Family member      
[] Caregiver         
[] Nursing 
[] Other Patient/Caregiver Teaching: 
Level of patient/caregiver understanding able to:  
[x] Indicates understanding       [] Needs reinforcement 
[] Unsuccessful      [] Verbal Understanding 
[] Demonstrated understanding       [] No evidence of learning 
[] Refused teaching         [] N/A Electronically signed by Patrick Rivera RN on 12/23/2020 at 1:18 PM

## 2020-12-23 NOTE — PROGRESS NOTES
Discussed case with Dr. Margarita Pittman. Chart reviewed, labs, and CT images viewed and analyzed  Proctitis on CT scan in pt with serious co-morbitities. Prior cdiff infection and continued severe diarrhea. Would recommend repeat cdiff test.  If negative, can consult GI for cscope and biopsies.     Emma Vogt, DO

## 2020-12-23 NOTE — PROGRESS NOTES
Bedside and verbal report given to ANGÉLICA Flynn RN (oncoming nurse) by Micaela Maldonado RN  (off going nurse). Report included the following information SBAR, Kardex, OR Summary, Intake/Output, and MAR. Pt had been very lethargic post dialysis. Pt complains of N/V but wasn't treated with any PRNs on this shift. Pt is incontinent of stool and had multiple small, clear jellylike stools. No acute changes, NAD. Bedside and verbal report given by (off going nurse) ANGÉLICA Flynn RN to (oncoming nurse) Micaela Maldonado RN. Report included the following information SBAR, Kardex, OR Summary, Intake/Output, and MAR.

## 2020-12-23 NOTE — PROGRESS NOTES
Hospitalist Progress Note    Patient: Robin Rico MRN: [id number]  CSN: [id number]    YOB: 1970  Age: 52 y.o.   Sex: male    DOA: 12/22/2020 LOS:  LOS: 1 day          Chief Complaint:      diarrhea    Assessment/Plan     51 yo male came with abd pain and missed dilaysis 10 days     Proctitis on CT scan  Has hx recurring c diff    Due to continued diarrhea and concerns for return of bowel infection, will start dificid,  as had very bad allergic reaction with vanco (albeit IV infusion) in past (kayla, scalded skin syndrome)     Uncontrolled HTN  Likely related to missing HD for 10 days   Resumed home meds     ESRD on HD with missed HD -  Dialysis again today    Hypokalemia-PO repeltion, repeat BMP after dialysis     Abdominal pain with NVD   Clears PO, advance as tolerated  CT abd/pelvis: possible esophagitis and proctitis  Stool studies/c diff pending, enteric isolation     Right BKA     Missing toes left foot     Anemia of CKD     Severe PVD     Continue supportive care     Surgery consult for proctitis     Expect few more days inpatient      Disposition :  Patient Active Problem List   Diagnosis Code    Hyponatremia E87.1    Anemia of chronic disease D63.8    Acute on chronic renal insufficiency N28.9, N18.9    Acute exacerbation of CHF (congestive heart failure) ([facility] I50.9    Iron deficiency anemia D50.9    Other restrictive cardiomyopathy ([facility] [address] I42.5    Dehydration E86.0    ESRD on hemodialysis ([facility] [address] N18.6, Z99.2    Cellulitis and abscess of foot L03.119, L02.619    HTN (hypertension) I10    Acute osteomyelitis ([facility] [address] M86.10    Hx of BKA, right ([facility] [facility] Z89.511    Esophagitis K20.90    Hypoglycemia E16.2    Hypertensive urgency I16.0    Abdominal pain with vomiting R10.9, R11.10    Diarrhea R19.7       Subjective:    Diarrhea  watery  Mucus also in stools, no blood  Nausea  No abd pain    Weak    Denies CP, SOB    Review of systems:    Constitutional: denies fevers, chills  Respiratory: denies SOB  Cardiovascular: denies chest pain, palpitations        Vital signs/Intake and Output:  Visit Vitals  BP (!) 167/65   Pulse 93   Temp 98.6 °F (37 °C)   Resp 18   Ht 5' 6\" (1.676 m)   Wt 64.4 kg (142 lb)   SpO2 100%   BMI 22.92 kg/m²     Current Shift:  No intake/output data recorded. Last three shifts:  12/21 1901 - 12/23 0700  In: 30 [P.O.:30]  Out: 700     Exam:    General: debilitated chronically ill appearing AAM alert, NAD, OX3  CVS:Regular rate and rhythm, no M/R/G, S1/S2 heard, no thrill  Lungs:Clear to auscultation bilaterally, no wheezes, rhonchi, or rales  Abdomen: Soft, Nontender, No distention, Normal Bowel sounds, No hepatomegaly  Extremities: BKA, right  Neuro:grossly normal , follows commands  Psych:appropriate                Labs: Results:       Chemistry Recent Labs     12/23/20  0200 12/22/20  0145   GLU 67* 75    142   K 2.6* 3.5    107   CO2 26 11*   BUN 68* 170*   CREA 11.30* 21.80*   CA 7.5* 7.5*   AGAP 14 24*   BUCR 6* 8*   AP  --  71   TP  --  8.8*   ALB  --  3.3*   GLOB  --  5.5*   AGRAT  --  0.6*      CBC w/Diff Recent Labs     12/23/20  0200 12/22/20  0145   WBC 4.9 7.3   RBC 2.86* 3.05*   HGB 7.8* 8.2*   HCT 21.7* 23.1*    189   GRANS  --  83*   LYMPH  --  9*   EOS  --  1      Cardiac Enzymes Recent Labs     12/22/20  0145      CKND1 1.3      Coagulation No results for input(s): PTP, INR, APTT, INREXT in the last 72 hours. Lipid Panel No results found for: CHOL, CHOLPOCT, CHOLX, CHLST, CHOLV, 896417, HDL, HDLP, LDL, LDLC, DLDLP, 449191, VLDLC, VLDL, TGLX, TRIGL, TRIGP, TGLPOCT, CHHD, CHHDX   BNP No results for input(s): BNPP in the last 72 hours.    Liver Enzymes Recent Labs     12/22/20  0145   TP 8.8*   ALB 3.3*   AP 71      Thyroid Studies No results found for: T4, T3U, TSH, TSHEXT     Procedures/imaging: see electronic medical records for all procedures/Xrays and details which were not copied into this note but were reviewed prior to creation of Agustin Byrd MD

## 2020-12-23 NOTE — PROGRESS NOTES
Problem: Falls - Risk of  Goal: *Absence of Falls  Description: Document Emily Beers Fall Risk and appropriate interventions in the flowsheet.   Outcome: Progressing Towards Goal  Note: Fall Risk Interventions:  Mobility Interventions: Patient to call before getting OOB         Medication Interventions: Teach patient to arise slowly    Elimination Interventions: Call light in reach

## 2020-12-23 NOTE — PROGRESS NOTES
5971  Bedside and verbal shift change report given to Noy Elder, RN (on coming nurse) by ANGÉLICA Reddy, RN (off going nurse). Report included the following information SBAR, Kardex, OR Summary, Intake/Output and MAR.    1025  C-diff sample sent to lab. 1140  Pt on hemodialysis. Held phenergan IVPB. 1253  Pt complains sharp abd pain after Tylenol 650 mg. Paged Dr. Fany Hughes. 1746  C-diff positive. Will continue Enteric Contact isolation. 1942  Bedside and verbal shift change report given by Cheryl,RN (off going nurse) to SANDRA Roberts RN(on coming nurse). Report included the following information SBAR, Kardex, OR Summary, Intake/Output and MAR.

## 2020-12-23 NOTE — PROGRESS NOTES
Pt in bed, A+O x4, no s/s of distress noted. Accessed Left upper arm AVG per protocol. Patient still had pressure dressing on access from previous treatment. Tx initiated at 1133. AVG flowing with ease. For hemodynamic stability UF goal 500 ml. Offered assistance with repositioning every 2 hours. Vascular access visible at all times during treatment, line connections intact at all times. @1936 Patient bp dropped to 87/48. UF was turned off and patient received a total of 300cc NS. MD notified of low bp.    @3577 received call from Nephrologist.  Per MD bolus 1L of NS to patient. Tx completed at 1438, tolerated well 0mL removed. Unit nurse given report. De-accessed per protocol. Clot time 5 minutes for arterial, and 5 minutes for venous. TREATMENT SUMMARY     Patient dialyzed in room 327  BFR  400  DFR  800  1000 ml removed via UF with a with a net removal 500 ml  Report given to Mague Castro RN with all questions answered     TREATMENT NOTES                                                                                                ACUTE HEMODIALYSIS FLOW SHEET       PATIENT INFORMATION   [address] [facility]       DR. Juice Zimmer    []1st Time Acute  []Stat[x] Routine []Urgent []Chronic Unit   []Acute Room [x]Bedside  []ICU/CCU []ER   Isolation Precautions: [x]Dialysis[] Airborne [x]Contact []Droplet []Reverse    Special Considerations:_______  [] Blood Consent Verified  [x]N/A   Allergies:[] NKA                 [] __see EMR___________ Code Status [x]Full Code [] DNR  [] Other_____   Diet: [x] Renal [] NPO [x] Diabetic   [] Enteral Feeding [] Cardiac Diabetic: [x]Yes []No     [x]Signed Treatment Consent Verified   [x] Time Out/ Safety Check   PRIMARY NURSE REPORT: FIRST INITIAL/ LAST NAME/TITLE  PRE DIALYSIS:   Mague Castro RN                   TIME: 1100   ACCESS   CATHETER ACCESS: [x] N/A  [] RIGHT  [] LEFT  [] IJ  [] SUBCL [] FEM                    [] First use X-ray  [] Tunnel     [] Non-Tunneled      [] No S/S infection  [] Redness [] Drainage  [] Cultured [] Swelling [] Pain                    [] Medical Aseptic [] Prep Dressing Changed                  [] Clotted [] Patent []      Flows: [] Good [] Poor [] Reversed                 If Access Problem Dr. Ismael Light: [] Yes [] No    Date:_____  [] N/A[]   GRAFT/FISTULA ACCESS:  [] N/A  [] RIGHT  [x] LEFT  [x] UE   [] LE       [x] AVG  [] AVF [] BUTTONHOLE    [x] +BRUIT/THRILL [x] MEDICAL ASEPTIC PREP     [x] No S/S infection  [] Redness [] Drainage  [] Cultured [] Swelling [] Pain              If Access Problem Dr. Ismael Light: [] Yes [] No    Date:______ [x] N/A   GENERAL ASSESSMENT   LUNGS:  SaO2% ____ [] Clear [] Coarse [] Crackles [] Wheezing               [] Diminished Location: [] RLL [] LLL [] RUL [] KYLAH    COUGH:  [] Productive  [] Loose[] Dry [x] N/A  RESPIRATIONS: [x] Easy [] Labored    THERAPY: [x] RA   [] NC _____. L/min    Mask: [] NRB [] Venti  _____O2%                  [] Ventilator [] Intubated [] Trach [] BiPap [] CPap [] HI Flow   CARDIAC: [x] Regular [] Irregular [] Pericardial Rub [] JVD               Monitored Rhythm:______ [] N/A   EDEMA: [] None [] Generalized [] Facial [] Pedal [] UE [] LE             [] Pitting [] 1 [] 2 [] 3 [] 4    [] Right [] Left [] Bilateral   SKIN:    [] Warm [] Hot [] Cold  [] Dry [] Pale [] Diaphoretic              [] Flushed [] Jaundiced [] Cyanotic [] Rash [] Weeping     LOC:    [x] Alert  Oriented to: [x] Person [x] Place [x] Time             [] Confused [] Lethargic [] Medicated [] Non-responsive    GI/ABDOMEN: [] Flat [] Distended [] Soft [] Firm [] Diarrhea [] Bowel Sounds Present [] Nausea [] Vomiting    PAIN: [] 0 [] 1 [] 2 [] 3 [] 4 [] 5 [] 6 [] 7 [] 8 [] 9 [x] 10          Scale 1-10 Action/Follow Up_Primary RN administered pain med____   MOBILITY: [] Amb [] Amb/Assist [x] Bed  [] Wheelchair    CURRENT LABS   HBsAg ONLY: Date Drawn:  1/29/20           [x] Negative [] Positive [] Unknown. HBsAb: Date Drawn:  1/29/20       [] Susceptible <10 [x] Immune ?10 [] Unknown   Date of Current Labs:  12/23/20 ATTACHED     EDUCATION   Person Educated: [x] Patient [] Other_________   Knowledge base: [] None [] Minimal [x] Substantial    Barriers to learning  [x] None _______________   Preferred method of learning: [x] Written [] Oral [x] Visual [] Hands on    Topic: [] Access Care [] S&S of infection [x] Fluid Management   [x] K+  [] Procedural  [] Albumin [] Medications [] Tx Options   [] Transplant [] Diet [] Other    Teaching Tools: [x] Explain [] Demonstration [] Handout_____ [] Video______  CARE PLAN    [x] Renal Failure (Adult)  Interdisciplinary  · Fluid and electrolytes stabilized  ? Interventions  · Dehydration signs and symptoms (eg: Weight/lab monitoring; vomiting/diarrhea/urine; tenting; mucous membranes; dizziness/lethargy/irritability/confusion; weak pulse; tachycardia; blood pressure; I&O)  · Fluid overload signs and symptoms assessment (eg: Body weight increased; dyspnea; edema; hypertension; respiratory crackles/wheezing; JVD; lab monitoring; mental status changes; I&O)  · Monitor appropriate lab values  · COMPLIANCE WITH PRESCRIBED THERAPY  · ARTERIAL ACCESS SITE ASSESSMENT  · NUTRITION SCREENING  · Vital signs monitoring per assessed patient condition or unit standard  · Cardiac monitoring  · Hydration management  · Intake and output measurement  · Body weight monitoring  · Skin care  · DIALYSIS  · Nutrition Care Process per nutrition screen  · Oral hygiene care every 2 hours  · Pain management     · Outcome   ? [x] Progressing Towards Goal  ? [] Not Progressing Towards Goals  ? [] Goals Met/Resolved  ? [] Goals Not Met/ Resolved        · Patient/ Family Education  ?  Progressing Towards Goals          RO/HEMODIAYLSIS MACHINE SAFETY CHECKS- BEFORE EACH TREATMENT          [x] THE [facility]: Machine Serial #1:  [id number]   RO Serial #1: [id number]                       [] [facility]: Machine Serial #2: [id number]    Serial #2: [id number]        [] THE [facility]: Machine Serial #3:  [id number]   RO Serial #6:[id number]    Alarm Test: [x] Pass  Time:_1118_______  [x] RO/Machine Log Complete    [x] Extracorporeal circuit Tested for integrity           Dialyzer_[employer]acle[employer] 300 Lot# [id number]_   Tubing_20E26-10___________    Dialysate: pH_7.2__  Temp. _36___Conductivity: Meter _14___ HD Machine_13.8__   CHLORINE TESTING- BEFORE EACH TREATMENT AND EVERY 4 HOURS   Total Chlorine: [x] Less than 0.1 ppm Time:_1110_2nd Check Time:__NR____  (If greater than 0.1 ppm from Primary then every 30 minutes from Secondary)   TREATMENT INIATION-WITH DIALYSIS PRECAUTIONS   [x] All Connections Secured   [x] Saline Line Double Clamped    [x] Venous Parameters Set [x] Arterial Parameters Set    [x] Prime Given 250 ml     [x] Air Foam Detector Engaged   PRE-TREATMENT   UF Calculations:  Wt to lose:_0___ml(+) Oral:_0_ml(+)IV Meds/Fluids/Blood prods_0__ml(+) Prime/Rinse__500_ml(=)Total UF Goal__500__mL   Scale Type:[x] Bed scale [] Sling Scale [] Wheel Chair Scale []  Not Ordered [] [] Unable to obtain pt on stretcher/ bed scale malfunctioning   Tx Initiation Note: see above note   [x] Time Out/Safety Check  Time:_1130____   INTRADIALYTIC MONITORING  (SEE ATTACHED FLOWSHEET)     POST TREATMENT    Time Medication Dose Volume Route    Initials   1130 Heparin 10,000units/1000ml 2000 units Arterial line of access TH                                   [employer] Title Initials Time   Nikole León RN TH                Dialyzer cleared: [x] Good [] Fair [] Poor     Blood Processed _65.2__Liters    Net UF Removed __0__mL  Post Tx Access:                  AVF/AVG: Bleeding Stop       Art._5__min Chaim.__5__min [x]+bruit/thrill                Catheter: Locking Solution [] Heparin 1 ml/1000 units  [] Normal Saline                                                                               Art._____ ml Chaim._____ml  Post Assessment:              Skin: []Warm []Dry []Diaphoretic []Flushed [] Pale []Cyanotic            Lungs: []Clear []Coarse []Crackles []Wheezing             Cardiac: [x]Regular []Irregular  []Monitored rhythm____ [x]N/A            Edema: [x]None []General []Facial []Pedal  []UE []LE []RIGHT []LEFT            Pain: [x]0 []1 []2 []3 []4 []5 []6 []7 []8 []9 []10   POST Tx Note:      See above note         Primary Nurse Report: First initial/Last name/Title    Post Dialysis:__Ellen Abbott RN______         Time:____1400_________    Abbreviations: AVG-arterial venous graft, AVF-arterial venous fistula, IJ-Internal Jugular,  Subcl-Subclavian, Fem-Femoral, Tx-treatment, AP/HR-apical heart rate, DFR-dialysate flow rate, BFR-blood flow rate, AP-arterial pressure, -venous pressure, UF-ultrafiltrate, TMP-transmembrane pressure, Chaim-Venous, Art-Arterial, RO-Reverse Osmosis

## 2020-12-24 VITALS
SYSTOLIC BLOOD PRESSURE: 125 MMHG | HEART RATE: 80 BPM | TEMPERATURE: 97.7 F | HEIGHT: 66 IN | BODY MASS INDEX: 28.62 KG/M2 | DIASTOLIC BLOOD PRESSURE: 56 MMHG | RESPIRATION RATE: 12 BRPM | WEIGHT: 178.1 LBS | OXYGEN SATURATION: 100 %

## 2020-12-24 PROBLEM — K62.89 INFECTIVE PROCTITIS: Status: ACTIVE | Noted: 2020-12-24

## 2020-12-24 LAB
ANION GAP SERPL CALC-SCNC: 15 MMOL/L (ref 3–18)
BUN SERPL-MCNC: 34 MG/DL (ref 7–18)
BUN/CREAT SERPL: 5 (ref 12–20)
CALCIUM SERPL-MCNC: 7.9 MG/DL (ref 8.5–10.1)
CHLORIDE SERPL-SCNC: 102 MMOL/L (ref 100–111)
CO2 SERPL-SCNC: 22 MMOL/L (ref 21–32)
CREAT SERPL-MCNC: 6.88 MG/DL (ref 0.6–1.3)
ERYTHROCYTE [DISTWIDTH] IN BLOOD BY AUTOMATED COUNT: 17.3 % (ref 11.6–14.5)
GLUCOSE BLD STRIP.AUTO-MCNC: 136 MG/DL (ref 70–110)
GLUCOSE BLD STRIP.AUTO-MCNC: 82 MG/DL (ref 70–110)
GLUCOSE SERPL-MCNC: 56 MG/DL (ref 74–99)
HAV IGM SER QL: NEGATIVE
HBV CORE IGM SER QL: NEGATIVE
HBV SURFACE AG SER QL: 0.37 INDEX
HBV SURFACE AG SER QL: NEGATIVE
HCT VFR BLD AUTO: 23.7 % (ref 36–48)
HCV AB SER IA-ACNC: 0.07 INDEX
HCV AB SERPL QL IA: NEGATIVE
HCV COMMENT,HCGAC: NORMAL
HGB BLD-MCNC: 8.2 G/DL (ref 13–16)
MCH RBC QN AUTO: 27.2 PG (ref 24–34)
MCHC RBC AUTO-ENTMCNC: 34.6 G/DL (ref 31–37)
MCV RBC AUTO: 78.7 FL (ref 74–97)
O+P SPEC MICRO: NORMAL
O+P STL CONC: NORMAL
PLATELET # BLD AUTO: 147 K/UL (ref 135–420)
POTASSIUM SERPL-SCNC: 3.4 MMOL/L (ref 3.5–5.5)
RBC # BLD AUTO: 3.01 M/UL (ref 4.7–5.5)
SODIUM SERPL-SCNC: 139 MMOL/L (ref 136–145)
SP1: NORMAL
SP2: NORMAL
SP3: NORMAL
SPECIMEN SOURCE: NORMAL
WBC # BLD AUTO: 7.3 K/UL (ref 4.6–13.2)

## 2020-12-24 PROCEDURE — 74011250636 HC RX REV CODE- 250/636: Performed by: HOSPITALIST

## 2020-12-24 PROCEDURE — 74011250637 HC RX REV CODE- 250/637: Performed by: FAMILY MEDICINE

## 2020-12-24 PROCEDURE — 85027 COMPLETE CBC AUTOMATED: CPT

## 2020-12-24 PROCEDURE — 82962 GLUCOSE BLOOD TEST: CPT

## 2020-12-24 PROCEDURE — 74011250637 HC RX REV CODE- 250/637: Performed by: HOSPITALIST

## 2020-12-24 PROCEDURE — P9047 ALBUMIN (HUMAN), 25%, 50ML: HCPCS | Performed by: HOSPITALIST

## 2020-12-24 PROCEDURE — 36415 COLL VENOUS BLD VENIPUNCTURE: CPT

## 2020-12-24 PROCEDURE — 80048 BASIC METABOLIC PNL TOTAL CA: CPT

## 2020-12-24 RX ADMIN — Medication 10 ML: at 06:41

## 2020-12-24 RX ADMIN — FIDAXOMICIN 200 MG: 200 TABLET, FILM COATED ORAL at 11:30

## 2020-12-24 RX ADMIN — ALBUMIN (HUMAN) 12.5 G: 0.25 INJECTION, SOLUTION INTRAVENOUS at 01:29

## 2020-12-24 RX ADMIN — HEPARIN SODIUM 5000 UNITS: 5000 INJECTION INTRAVENOUS; SUBCUTANEOUS at 04:00

## 2020-12-24 RX ADMIN — AMLODIPINE BESYLATE 10 MG: 5 TABLET ORAL at 10:40

## 2020-12-24 RX ADMIN — CARVEDILOL 25 MG: 12.5 TABLET, FILM COATED ORAL at 08:14

## 2020-12-24 RX ADMIN — SODIUM BICARBONATE 650 MG: 650 TABLET ORAL at 09:00

## 2020-12-24 RX ADMIN — Medication 10 ML: at 06:42

## 2020-12-24 NOTE — DISCHARGE SUMMARY
Discharge Summary    Patient: Jared Villafuerte MRN: [id number]  CSN: [id number]    YOB: 1970  Age: 52 y.o.   Sex: male    DOA: 12/22/2020 LOS:  LOS: 2 days   Discharge Date:      Primary Care Provider:  Lucille Sevilla MD    Admission Diagnoses: ESRD on hemodialysis ([facility] [N18.6, Z99.2]  Hypertensive urgency [I16.0]    Discharge Diagnoses:    Problem List as of 12/24/2020 Date Reviewed: 1/30/2020          Codes Class Noted - Resolved    Infective proctitis ICD-10-CM: K62.89  ICD-9-CM: 569.49  12/24/2020 - Present        Hypertensive urgency ICD-10-CM: I16.0  ICD-9-CM: 401.9  12/22/2020 - Present        Abdominal pain with vomiting ICD-10-CM: R10.9, R11.10  ICD-9-CM: 789.00, 787.03  12/22/2020 - Present        Diarrhea ICD-10-CM: R19.7  ICD-9-CM: 787.91  12/22/2020 - Present        Hypoglycemia ICD-10-CM: E16.2  ICD-9-CM: 251.2  1/30/2020 - Present        Dehydration ICD-10-CM: E86.0  ICD-9-CM: 276.51  1/29/2020 - Present        ESRD on hemodialysis ([facility] ICD-10-CM: N18.6, Z99.2  ICD-9-CM: 585.6, V45.11  1/29/2020 - Present        Cellulitis and abscess of foot ICD-10-CM: L03.119, L02.619  ICD-9-CM: 682.7  1/29/2020 - Present        HTN (hypertension) ICD-10-CM: I10  ICD-9-CM: 401.9  1/29/2020 - Present        Acute osteomyelitis ([facility] ICD-10-CM: M86.10  ICD-9-CM: 730.00  1/29/2020 - Present        Hx of BKA, right ([facility] ICD-10-CM: Z89.511  ICD-9-CM: V49.75  1/29/2020 - Present        Esophagitis ICD-10-CM: K20.90  ICD-9-CM: 530.10  1/29/2020 - Present        Iron deficiency anemia ICD-10-CM: D50.9  ICD-9-CM: 280.9  3/4/2018 - Present        Other restrictive cardiomyopathy ([facility] ICD-10-CM: I42.5  ICD-9-CM: 425.4  3/4/2018 - Present        Hyponatremia ICD-10-CM: E87.1  ICD-9-CM: 276.1  2/28/2018 - Present        Anemia of chronic disease ICD-10-CM: D63.8  ICD-9-CM: 285.29  2/28/2018 - Present        Acute on chronic renal insufficiency ICD-10-CM: N28.9, N18.9  ICD-9-CM: 593.9, 585.9  2/28/2018 - Present        Acute exacerbation of CHF (congestive heart failure) ([facility]) ICD-10-CM: I50.9  ICD-9-CM: 428.0  2/28/2018 - Present              Discharge Medications:     Current Discharge Medication List      START taking these medications    Details   fidaxomicin (DIFICID) 200 mg tab tablet Take 1 Tab by mouth two (2) times a day. Qty: 14 Tab, Refills: 0         CONTINUE these medications which have NOT CHANGED    Details   doxazosin (CARDURA) 4 mg tablet Take 2 mg by mouth daily. sevelamer carbonate (RENVELA) 800 mg tab tab Take 800 mg by mouth three (3) times daily (with meals). amLODIPine (NORVASC) 10 mg tablet Take 1 Tab by mouth daily. Qty: 30 Tab, Refills: 0      carvedilol (COREG) 25 mg tablet Take 1 Tab by mouth two (2) times daily (with meals). Qty: 60 Tab, Refills: 0      hydrALAZINE (APRESOLINE) 25 mg tablet Take 1 Tab by mouth three (3) times daily. Qty: 90 Tab, Refills: 0      omeprazole (PRILOSEC) 40 mg capsule Take 40 mg by mouth daily. sodium bicarbonate 650 mg tablet Take 650 mg by mouth three (3) times daily. cefazolin sodium/dextrose,iso (CEFAZOLIN IN DEXTROSE, ISO-OS,) 2 gram/50 mL pgbk 50 mL by IntraVENous route every Monday. Mwf, 2-2-3 g after HD  Qty: 50 mL, Refills: 0      isosorbide mononitrate ER (IMDUR) 60 mg CR tablet Take 1 Tab by mouth daily. Qty: 30 Tab, Refills: 0      furosemide (LASIX) 80 mg tablet Take 1 Tab by mouth two (2) times a day.   Qty: 60 Tab, Refills: 0         STOP taking these medications       clindamycin (CLEOCIN) 300 mg capsule Comments:   Reason for Stopping:         ciprofloxacin HCl (CIPRO) 500 mg tablet Comments:   Reason for Stopping:               Discharge Condition: Fair    Procedures :      Consults: Nephrology      PHYSICAL EXAM    Visit Vitals  /64   Pulse 84   Temp 98.1 °F (36.7 °C)   Resp 17   Ht 5' 6\" (1.676 m)   Wt 80.8 kg (178 lb 1.6 oz)   SpO2 99%   BMI 28.75 kg/m²     General: Awake, cooperative, no acute distress HEENT: NC, Atraumatic. PERRLA, EOMI. Anicteric sclerae. Lungs:  CTA Bilaterally. No Wheezing/Rhonchi/Rales. Heart:  Regular  rhythm,  No murmur, No Rubs, No Gallops  Abdomen: Soft, Non distended, Non tender. +Bowel sounds,   Extremities: No c/c/e  Psych:   Not anxious or agitated. Neurologic:  No acute neurological deficits.                                      Admission HPI :         Bianka Aparicio is a 52 y.o. male with past medical h/o end-stage renal disease on hemodialysis Tuesday, Thursday and Saturday presents to the emergency department via ambulance complaining nausea, vomiting and diarrhea that started yesterday. Patient reports that he has not had dialysis for 10 days.  Last hemodialysis was last Saturday.  Patient states that he was unable to go to dialysis due to transportation. Floyd Braxton only reporting of burning throat pain from the vomiting.  Pt denies chest pain, shortness of breath, abdominal pain and any other symptoms or complaints.           Past Medical History:   Diagnosis Date    Chronic kidney disease      Diabetes ([facility]      Heart failure ([facility]      Hypertension                 Past Surgical History:   Procedure Laterality Date    [de-identified]         right BKA 2017         Hospital Course : 51 yo male came with abd pain and missed dilaysis 10 days     Proctitis on CT scan with cdiff pos -- diarrhea improved        Due to continued diarrhea and concerns for return of bowel infection, will cont  dificid,  as had very bad allergic reaction with vanco (albeit IV infusion) in past (kayla, scalded skin syndrome)     Uncontrolled HTN   Resumed home meds     ESRD on HD with missed HD -  Dialysis again today     Hypokalemia-PO repeltion, repeat BMP after dialysis     Abdominal pain with NVD   Clears PO, advance as tolerated  CT abd/pelvis: possible esophagitis and proctitis  Stool studies/c diff pending, enteric isolation     Right BKA     Missing toes left foot     Anemia of CKD     Severe PVD  Activity: Activity as tolerated    Diet: Renal Diet    Follow-up:      Disposition: home    Minutes spent on discharge: 55      Labs: Results:       Chemistry Recent Labs     12/24/20  0430 12/23/20  0200 12/22/20  0145   GLU 56* 67* 75    141 142   K 3.4* 2.6* 3.5    101 107   CO2 22 26 11*   BUN 34* 68* 170*   CREA 6.88* 11.30* 21.80*   CA 7.9* 7.5* 7.5*   AGAP 15 14 24*   BUCR 5* 6* 8*   AP  --   --  71   TP  --   --  8.8*   ALB  --   --  3.3*   GLOB  --   --  5.5*   AGRAT  --   --  0.6*      CBC w/Diff Recent Labs     12/24/20 0430 12/23/20  0200 12/22/20  0145   WBC 7.3 4.9 7.3   RBC 3.01* 2.86* 3.05*   HGB 8.2* 7.8* 8.2*   HCT 23.7* 21.7* 23.1*    181 189   GRANS  --   --  83*   LYMPH  --   --  9*   EOS  --   --  1      Cardiac Enzymes Recent Labs     12/22/20  0145      CKND1 1.3      Coagulation No results for input(s): PTP, INR, APTT, INREXT in the last 72 hours. Lipid Panel No results found for: CHOL, CHOLPOCT, CHOLX, CHLST, CHOLV, 447736, HDL, HDLP, LDL, LDLC, DLDLP, 598589, VLDLC, VLDL, TGLX, TRIGL, TRIGP, TGLPOCT, CHHD, CHHDX   BNP No results for input(s): BNPP in the last 72 hours. Liver Enzymes Recent Labs     12/22/20  0145   TP 8.8*   ALB 3.3*   AP 71      Thyroid Studies No results found for: T4, T3U, TSH, TSHEXT         Significant Diagnostic Studies: Ct Abd Pelv Wo Cont    Result Date: 12/22/2020  EXAM: CT of the abdomen and pelvis INDICATION: Pain. COMPARISON: January 29, 2020. TECHNIQUE: Axial CT imaging of the abdomen and pelvis was performed without intravenous contrast. Multiplanar reformats were generated. Dose reduction techniques used: automated exposure control, adjustment of the mAs and/or kVp according to patient size, and iterative reconstruction techniques.  Digital imaging and communications in Medicine (DICOM) format image data are available to nonaffiliated external healthcare facilities or entities on a secure, media free, reciprocally searchable basis with patient authorization for at least 12 months after this study. _______________ FINDINGS: LOWER CHEST: Unremarkable. LIVER, BILIARY: Liver is normal. No biliary dilation. Gallbladder is unremarkable. PANCREAS: Normal. SPLEEN: Normal. ADRENALS: Normal. KIDNEYS: Normal. LYMPH NODES: No enlarged lymph nodes. GASTROINTESTINAL TRACT: There is mild thickening of the visualized distal esophagus. There is descending and sigmoid colon diverticulosis without evidence for diverticulitis. The appendix is not confidently seen as a separate structure. There is rectal thickening. PELVIC ORGANS: Unremarkable. VASCULATURE: Unremarkable. BONES: No acute or aggressive osseous abnormalities identified. OTHER: None. _______________     IMPRESSION: Descending and sigmoid colon diverticulosis without evidence for diverticulitis. Thickening of the visualized distal esophagus possibly representing esophagitis. Apparent rectal thickening. Consider proctitis. Xr Chest Port    Result Date: 12/22/2020  EXAM: XR CHEST PORT CLINICAL INDICATION/HISTORY: HTN, missed dialysis -Additional: None COMPARISON: 1/29/2020 TECHNIQUE: Portable frontal view of the chest _______________ FINDINGS: SUPPORT DEVICES: None. HEART AND MEDIASTINUM: Cardiomediastinal silhouette within normal limits. LUNGS AND PLEURAL SPACES: No dense consolidation, large effusion or pneumothorax. _______________     IMPRESSION: No acute cardiopulmonary abnormality. No results found for this or any previous visit.            CC: Deedee Barnett MD

## 2020-12-24 NOTE — PROGRESS NOTES
2272 Primary nurse informed this nurse that patient is refusing dialysis. When I went to speak with him he stated that he had dialysis 2 days in a row and isn't able to tolerate 3 consecutive days. Explained the risks of not completing scheduled dialysis treatments and patient verbalized understanding.  Dr. Lorenza Renae aware

## 2020-12-24 NOTE — PROGRESS NOTES
D/C Plan: [facility] and physician follow up     CM has been notified SIGIFREDO VILLEDA [facility] is not able to accept this pt due to staffing. CM contacted pt and pt is agreeable to any [facility] agency that can meet his needs. [facility] has indicated they are able to service this pt. CMS has been notified to assist.  Anticipate pt will transition home today. CM verified pt's address and phone number. Pt is aware and in agreement with [facility]. No other transition of care needs have been identified.       Care Management Interventions  Transition of Care Consult (CM Consult): Discharge Planning, [address]: No  Reason Outside [place]: [facility]  Health Maintenance Reviewed: Yes  Current Support Network: Lives Alone, Family Lives Nearby  Confirm Follow Up Transport: Cab  The Plan for Transition of Care is Related to the Following [facility]   The Patient and/or Patient Representative was Provided with a Choice of Provider and Agrees with the Discharge Plan?: Yes  Name of the Patient Representative Who was Provided with a Choice of Provider and Agrees with the Discharge Plan: pt  Freedom of Choice List was Provided with Basic Dialogue that Supports the Patient's Individualized Plan of Care/Goals, Treatment Preferences and Shares the Quality Data Associated with the Providers?: Yes  Discharge Location  Discharge Placement: Home with home health

## 2020-12-24 NOTE — PROGRESS NOTES
12/24/20 1143   Vital Signs   Temp 97.7 °F (36.5 °C)   Temp Source Oral   Pulse (Heart Rate) 80   Heart Rate Source Monitor   Resp Rate 12   O2 Sat (%) 100 %   Level of Consciousness Alert   BP (!) 125/56   MAP (Calculated) 79   BP 1 Method Automatic   BP 1 Location Right arm   BP Patient Position At rest   MEWS Score 0       S: Discharge completed. B: On behalf of Attending Nurse Sheryle Bible, patient presented with discharge orders home with home health was completed. Patient presented awake, alert and responsive with the most recent vital signs listed above. Zahira removed patient's IV Prior to discharge from facility. A: At 1200 pm, patient was provided discharge education, instructions and notified of follow up appointments and discharge prescriptions. Understanding was verbalized by patient. Patient was informed to notify nursing staff when discharge ride was at front entrance so that he could be escorted via wheelchair. R: Will continue with prescribed plan of care.    Eber Grant  12/24/2020  12:00 PM

## 2020-12-25 NOTE — ROUTINE PROCESS
0725 
Bedside shift change report given to [address] (oncoming nurse) by Barak Ramos RN (offgoing nurse). Report included the following information SBAR, Kardex, Intake/Output and MAR.

## 2020-12-25 NOTE — PROGRESS NOTES
0745  Assumed care at this time. Patient alert and oriented x4. Denies SOB, chest pain. Shows no signs of distress. Patient lungs diminished but clear bilaterally. Encourage deep breathing and ambulation/HOB elevated. Cap refill < 3 sec to all extremities except amputation on RLE. Patient has 20 G IV to R hand CDI. Dressing to LLE CDI. Stated pain 0/10. Bruit present on  LUE. Bowel sounds present. Skin intact. Denies n/v. Patient call light and possessions within reach. Bed locked and in lowest position. Nurse will continue to monitor. 0332  Patient refusing dialysis. 1200  Discharge to be completed by Sofia Frost RN.

## 2021-06-03 ENCOUNTER — HOSPITAL ENCOUNTER (EMERGENCY)
Age: 51
Discharge: HOME | End: 2021-06-03
Attending: EMERGENCY MEDICINE
Payer: MEDICARE

## 2021-06-03 VITALS
WEIGHT: 178.57 LBS | BODY MASS INDEX: 28.03 KG/M2 | DIASTOLIC BLOOD PRESSURE: 71 MMHG | RESPIRATION RATE: 20 BRPM | SYSTOLIC BLOOD PRESSURE: 170 MMHG | HEART RATE: 98 BPM | OXYGEN SATURATION: 98 % | HEIGHT: 67 IN | TEMPERATURE: 97.5 F

## 2021-06-03 DIAGNOSIS — D64.9 CHRONIC ANEMIA: ICD-10-CM

## 2021-06-03 DIAGNOSIS — Z99.2 ESRD (END STAGE RENAL DISEASE) ON DIALYSIS (HCC): Primary | ICD-10-CM

## 2021-06-03 DIAGNOSIS — N18.6 ESRD (END STAGE RENAL DISEASE) ON DIALYSIS (HCC): Primary | ICD-10-CM

## 2021-06-03 LAB
ABO + RH BLD: NORMAL
ALBUMIN SERPL-MCNC: 3.2 G/DL (ref 3.4–5)
ALBUMIN/GLOB SERPL: 0.6 {RATIO} (ref 0.8–1.7)
ALP SERPL-CCNC: 91 U/L (ref 45–117)
ALT SERPL-CCNC: 16 U/L (ref 16–61)
ANION GAP SERPL CALC-SCNC: 8 MMOL/L (ref 3–18)
AST SERPL-CCNC: 7 U/L (ref 10–38)
BASOPHILS # BLD: 0 K/UL (ref 0–0.1)
BASOPHILS NFR BLD: 0 % (ref 0–2)
BILIRUB SERPL-MCNC: 0.6 MG/DL (ref 0.2–1)
BLOOD GROUP ANTIBODIES SERPL: NORMAL
BUN SERPL-MCNC: 31 MG/DL (ref 7–18)
BUN/CREAT SERPL: 4 (ref 12–20)
CALCIUM SERPL-MCNC: 8.8 MG/DL (ref 8.5–10.1)
CHLORIDE SERPL-SCNC: 100 MMOL/L (ref 100–111)
CO2 SERPL-SCNC: 31 MMOL/L (ref 21–32)
CREAT SERPL-MCNC: 7.1 MG/DL (ref 0.6–1.3)
DIFFERENTIAL METHOD BLD: ABNORMAL
EOSINOPHIL # BLD: 0.3 K/UL (ref 0–0.4)
EOSINOPHIL NFR BLD: 4 % (ref 0–5)
ERYTHROCYTE [DISTWIDTH] IN BLOOD BY AUTOMATED COUNT: 17.8 % (ref 11.6–14.5)
GLOBULIN SER CALC-MCNC: 5.5 G/DL (ref 2–4)
GLUCOSE SERPL-MCNC: 92 MG/DL (ref 74–99)
HCT VFR BLD AUTO: 24.7 % (ref 36–48)
HGB BLD-MCNC: 7.7 G/DL (ref 13–16)
LYMPHOCYTES # BLD: 0.7 K/UL (ref 0.9–3.6)
LYMPHOCYTES NFR BLD: 11 % (ref 21–52)
MCH RBC QN AUTO: 26.7 PG (ref 24–34)
MCHC RBC AUTO-ENTMCNC: 31.2 G/DL (ref 31–37)
MCV RBC AUTO: 85.8 FL (ref 74–97)
MONOCYTES # BLD: 0.8 K/UL (ref 0.05–1.2)
MONOCYTES NFR BLD: 12 % (ref 3–10)
NEUTS SEG # BLD: 4.8 K/UL (ref 1.8–8)
NEUTS SEG NFR BLD: 73 % (ref 40–73)
PLATELET # BLD AUTO: 254 K/UL (ref 135–420)
PLATELET COMMENTS,PCOM: ABNORMAL
PMV BLD AUTO: 11.4 FL (ref 9.2–11.8)
POTASSIUM SERPL-SCNC: 3.8 MMOL/L (ref 3.5–5.5)
PROT SERPL-MCNC: 8.7 G/DL (ref 6.4–8.2)
RBC # BLD AUTO: 2.88 M/UL (ref 4.35–5.65)
RBC MORPH BLD: ABNORMAL
RBC MORPH BLD: ABNORMAL
SODIUM SERPL-SCNC: 139 MMOL/L (ref 136–145)
SPECIMEN EXP DATE BLD: NORMAL
TROPONIN I SERPL-MCNC: 0.02 NG/ML (ref 0–0.04)
WBC # BLD AUTO: 6.6 K/UL (ref 4.6–13.2)

## 2021-06-03 PROCEDURE — 93005 ELECTROCARDIOGRAM TRACING: CPT

## 2021-06-03 PROCEDURE — 80053 COMPREHEN METABOLIC PANEL: CPT

## 2021-06-03 PROCEDURE — 86901 BLOOD TYPING SEROLOGIC RH(D): CPT

## 2021-06-03 PROCEDURE — 99284 EMERGENCY DEPT VISIT MOD MDM: CPT

## 2021-06-03 PROCEDURE — 84484 ASSAY OF TROPONIN QUANT: CPT

## 2021-06-03 PROCEDURE — 85025 COMPLETE CBC W/AUTO DIFF WBC: CPT

## 2021-06-03 NOTE — ED NOTES
Verbal shift change report given to Iris Bragg RN (oncoming nurse) by Jamal Saravia RN (offgoing nurse). Report included the following information SBAR, Kardex, ED Summary, [facility] and Recent Results.

## 2021-06-03 NOTE — ED TRIAGE NOTES
Patient ambulatory w/rollader into ER c/o of fatigue. Patient states he was told by dialysis to come to ER due to hemoglobin of around 7.  Patient denies any CP or SOB

## 2021-06-03 NOTE — ED PROVIDER NOTES
EMERGENCY DEPARTMENT HISTORY AND PHYSICAL EXAM    Date: 6/3/2021  Patient Name: Gustavo Lopes. History of Presenting Illness     Chief Complaint   Patient presents with    Fatigue         History Provided By: Patient      Additional History (Context): Gustavo Pepper is a 52 y.o. male with diabetes, hypertension and End-stage renal disease on dialysis, MI who presents with reported low hemoglobin yesterday after dialysis. Was instructed to come to the emergency department today. He had momentary shortness of breath earlier today but denies any currently. Denies any chest pain lightheadedness dizziness nausea vomiting diarrhea. He makes very little urine but denies any hematuria or melena hematochezia or joint swelling. Denies any anticoagulation. PCP: Samuel Callaway MD    Current Outpatient Medications   Medication Sig Dispense Refill    fidaxomicin (DIFICID) 200 mg tab tablet Take 1 Tab by mouth two (2) times a day. 14 Tab 0    doxazosin (CARDURA) 4 mg tablet Take 2 mg by mouth daily.  sodium hypochlorite 0.0125% (DAKINS SOLUTION) Apply 1 mL to affected area two (2) times a week.  omeprazole (PRILOSEC) 40 mg capsule Take 40 mg by mouth daily.  sevelamer carbonate (RENVELA) 800 mg tab tab Take 800 mg by mouth three (3) times daily (with meals).  sodium bicarbonate 650 mg tablet Take 650 mg by mouth three (3) times daily.  cefazolin sodium/dextrose,iso (CEFAZOLIN IN DEXTROSE, ISO-OS,) 2 gram/50 mL pgbk 50 mL by IntraVENous route every Monday. Mwf, 2-2-3 g after HD 50 mL 0    amLODIPine (NORVASC) 10 mg tablet Take 1 Tab by mouth daily. 30 Tab 0    carvedilol (COREG) 25 mg tablet Take 1 Tab by mouth two (2) times daily (with meals). 60 Tab 0    hydrALAZINE (APRESOLINE) 25 mg tablet Take 1 Tab by mouth three (3) times daily. 90 Tab 0    isosorbide mononitrate ER (IMDUR) 60 mg CR tablet Take 1 Tab by mouth daily.  30 Tab 0    furosemide (LASIX) 80 mg tablet Take 1 Tab by mouth two (2) times a day. 61 Tab 0       Past History     Past Medical History:  Past Medical History:   Diagnosis Date    Chronic kidney disease     Diabetes ([facility] [address]     Heart failure ([facility] [address] [facility]     Hypertension     Sickle cell trait ([facility] [address]        Past Surgical History:  Past Surgical History:   Procedure Laterality Date    HX ORTHOPAEDIC      right BKA 2017       Family History:  History reviewed. No pertinent family history. Social History:  Social History     Tobacco Use    Smoking status: Former Smoker    Smokeless tobacco: Never Used   Substance Use Topics    Alcohol use: No    Drug use: No       Allergies: Allergies   Allergen Reactions    Penicillins Rash    Vancomycin Other (comments)     kayla syndrome         Review of Systems   Review of Systems   Constitutional: Positive for fatigue. Negative for fever. HENT: Negative. Eyes: Negative. Respiratory: Negative for shortness of breath. Cardiovascular: Negative for chest pain. Gastrointestinal: Negative for abdominal pain, diarrhea, nausea and vomiting. Endocrine: Negative. Genitourinary: Negative. Musculoskeletal: Negative for joint swelling. Allergic/Immunologic: Negative. Neurological: Negative for weakness and numbness. Hematological: Negative. Psychiatric/Behavioral: Negative. All Other Systems Negative  Physical Exam     Vitals:    06/03/21 1828 06/03/21 1900   BP: (!) 157/64 (!) 170/71   Pulse: (!) 104 98   Resp: 20 20   Temp: 97.5 °F (36.4 °C)    SpO2: 96% 98%   Weight: 81 kg (178 lb 9.2 oz)    Height: 5' 7\" (1.702 m)      Physical Exam  Vitals and nursing note reviewed. Constitutional:       General: He is not in acute distress. Appearance: He is well-developed. He is not ill-appearing, toxic-appearing or diaphoretic. HENT:      Head: Normocephalic and atraumatic. Neck:      Thyroid: No thyromegaly. Vascular: No carotid bruit. Trachea: No tracheal deviation.    Cardiovascular: Rate and Rhythm: Normal rate and regular rhythm. Heart sounds: Normal heart sounds. No murmur heard. No friction rub. No gallop. Pulmonary:      Effort: Pulmonary effort is normal. No respiratory distress. Breath sounds: Normal breath sounds. No stridor. No wheezing or rales. Chest:      Chest wall: No tenderness. Abdominal:      General: There is no distension. Palpations: Abdomen is soft. There is no mass. Tenderness: There is no abdominal tenderness. There is no guarding or rebound. Musculoskeletal:         General: Deformity present. Cervical back: Normal range of motion and neck supple. Comments: Right BKA. Skin:     General: Skin is warm and dry. Coloration: Skin is not pale. Neurological:      Mental Status: He is alert. Psychiatric:         Speech: Speech normal.         Behavior: Behavior normal.         Thought Content: Thought content normal.         Judgment: Judgment normal.            Diagnostic Study Results     Labs -     Recent Results (from the past 12 hour(s))   CBC WITH AUTOMATED DIFF    Collection Time: 06/03/21  6:53 PM   Result Value Ref Range    WBC 6.6 4.6 - 13.2 K/uL    RBC 2.88 (L) 4.35 - 5.65 M/uL    HGB 7.7 (L) 13.0 - 16.0 g/dL    HCT 24.7 (L) 36.0 - 48.0 %    MCV 85.8 74.0 - 97.0 FL    MCH 26.7 24.0 - 34.0 PG    MCHC 31.2 31.0 - 37.0 g/dL    RDW 17.8 (H) 11.6 - 14.5 %    PLATELET 118 699 - 894 K/uL    MPV 11.4 9.2 - 11.8 FL    NEUTROPHILS 73 40 - 73 %    LYMPHOCYTES 11 (L) 21 - 52 %    MONOCYTES 12 (H) 3 - 10 %    EOSINOPHILS 4 0 - 5 %    BASOPHILS 0 0 - 2 %    ABS. NEUTROPHILS 4.8 1.8 - 8.0 K/UL    ABS. LYMPHOCYTES 0.7 (L) 0.9 - 3.6 K/UL    ABS. MONOCYTES 0.8 0.05 - 1.2 K/UL    ABS. EOSINOPHILS 0.3 0.0 - 0.4 K/UL    ABS.  BASOPHILS 0.0 0.0 - 0.1 K/UL    DF AUTOMATED      PLATELET COMMENTS ADEQUATE PLATELETS      RBC COMMENTS ANISOCYTOSIS  1+        RBC COMMENTS HYPOCHROMIA  1+       METABOLIC PANEL, COMPREHENSIVE    Collection Time: 06/03/21  6:53 PM   Result Value Ref Range    Sodium 139 136 - 145 mmol/L    Potassium 3.8 3.5 - 5.5 mmol/L    Chloride 100 100 - 111 mmol/L    CO2 31 21 - 32 mmol/L    Anion gap 8 3.0 - 18 mmol/L    Glucose 92 74 - 99 mg/dL    BUN 31 (H) 7.0 - 18 MG/DL    Creatinine 7.10 (H) 0.6 - 1.3 MG/DL    BUN/Creatinine ratio 4 (L) 12 - 20      GFR est AA 10 (L) >60 ml/min/1.73m2    GFR est non-AA 8 (L) >60 ml/min/1.73m2    Calcium 8.8 8.5 - 10.1 MG/DL    Bilirubin, total 0.6 0.2 - 1.0 MG/DL    ALT (SGPT) 16 16 - 61 U/L    AST (SGOT) 7 (L) 10 - 38 U/L    Alk. phosphatase 91 45 - 117 U/L    Protein, total 8.7 (H) 6.4 - 8.2 g/dL    Albumin 3.2 (L) 3.4 - 5.0 g/dL    Globulin 5.5 (H) 2.0 - 4.0 g/dL    A-G Ratio 0.6 (L) 0.8 - 1.7     TROPONIN I    Collection Time: 06/03/21  6:53 PM   Result Value Ref Range    Troponin-I, QT 0.02 0.0 - 0.045 NG/ML   TYPE & SCREEN    Collection Time: 06/03/21  7:45 PM   Result Value Ref Range    Crossmatch Expiration 06/06/2021,2359     ABO/Rh(D) O POSITIVE     Antibody screen NEG        Radiologic Studies -   No orders to display     CT Results  (Last 48 hours)    None        CXR Results  (Last 48 hours)    None            Medical Decision Making   I am the first provider for this patient. I reviewed the vital signs, available nursing notes, past medical history, past surgical history, family history and social history. Vital Signs-Reviewed the patient's vital signs.       Records Reviewed: Nursing Notes, Old Medical Records and Previous Laboratory Studies    Procedures:  EKG    Date/Time: 6/3/2021 7:22 PM  Performed by: Merline Runner, PA  Authorized by: Merline Runner, PA     Interpretation:     Interpretation: abnormal    Rate:     ECG rate:  98    ECG rate assessment: normal    Rhythm:     Rhythm: sinus rhythm    Ectopy:     Ectopy: none    QRS:     QRS axis:  Normal    QRS intervals:  Normal  Conduction:     Conduction: normal    ST segments:     ST segments:  Normal  T waves:     T waves: normal    Other findings:     Other findings: prolonged qTc interval      Other findings comment:  QTc 474ms        Provider Notes (Medical Decision Making): Patient is essentially asymptomatic with a single episode of shortness of breath now resolved. He has a history of prolonged QTC and his QTC is not over 500 ms. His H&H is stable at baseline due to his CKD. Will not transfuse as discussed with ED attending and have him follow-up with nephrology tomorrow for his dialysis appointment. Advised to return for any worsening of symptoms. MED RECONCILIATION:  No current facility-administered medications for this encounter. Current Outpatient Medications   Medication Sig    fidaxomicin (DIFICID) 200 mg tab tablet Take 1 Tab by mouth two (2) times a day.  doxazosin (CARDURA) 4 mg tablet Take 2 mg by mouth daily.  sodium hypochlorite 0.0125% (DAKINS SOLUTION) Apply 1 mL to affected area two (2) times a week.  omeprazole (PRILOSEC) 40 mg capsule Take 40 mg by mouth daily.  sevelamer carbonate (RENVELA) 800 mg tab tab Take 800 mg by mouth three (3) times daily (with meals).  sodium bicarbonate 650 mg tablet Take 650 mg by mouth three (3) times daily.  cefazolin sodium/dextrose,iso (CEFAZOLIN IN DEXTROSE, ISO-OS,) 2 gram/50 mL pgbk 50 mL by IntraVENous route every Monday. Mwf, 2-2-3 g after HD    amLODIPine (NORVASC) 10 mg tablet Take 1 Tab by mouth daily.  carvedilol (COREG) 25 mg tablet Take 1 Tab by mouth two (2) times daily (with meals).  hydrALAZINE (APRESOLINE) 25 mg tablet Take 1 Tab by mouth three (3) times daily.  isosorbide mononitrate ER (IMDUR) 60 mg CR tablet Take 1 Tab by mouth daily.  furosemide (LASIX) 80 mg tablet Take 1 Tab by mouth two (2) times a day. Disposition:  home    DISCHARGE NOTE:   8:42 PM    Pt has been reexamined. Patient has no new complaints, changes, or physical findings. Care plan outlined and precautions discussed.   Results of exam, labs were reviewed with the patient. All medications were reviewed with the patient. All of pt's questions and concerns were addressed. Patient was instructed and agrees to follow up with PCP, nephrology, as well as to return to the ED upon further deterioration. Patient is ready to go home. Follow-up Information     Follow up With Specialties Details Why Contact Info    Pattie Nixon MD Internal Medicine Schedule an appointment as soon as possible for a visit in 1 day  [address]      [facility] EMERGENCY DEPT Emergency Medicine  If symptoms worsen return immediately [address] [place]  [phone]          Current Discharge Medication List              Diagnosis     Clinical Impression:   1. ESRD (end stage renal disease) on dialysis ([facility] [address]    2.  Chronic anemia

## 2021-06-04 LAB
ATRIAL RATE: 98 BPM
CALCULATED P AXIS, ECG09: 94 DEGREES
CALCULATED R AXIS, ECG10: -2 DEGREES
CALCULATED T AXIS, ECG11: 93 DEGREES
DIAGNOSIS, 93000: NORMAL
P-R INTERVAL, ECG05: 160 MS
Q-T INTERVAL, ECG07: 372 MS
QRS DURATION, ECG06: 82 MS
QTC CALCULATION (BEZET), ECG08: 474 MS
VENTRICULAR RATE, ECG03: 98 BPM

## 2021-08-09 NOTE — PROGRESS NOTES
1920: Assumed patient care, he is alert and oriented to person, place, time and situation. Respiratory status is stable on 2L via nasal cannula. Vital signs are stable. MEWS score is a zero. Patient denies any nausea vomiting dizziness or anxiety. White board is updated and explained. Bed is locked and in lowest position. Call bell, water and personal belongings are within reach. Patient has no questions, comments or concerns after bedside shift report. 0700: Patient had an uneventful shift. Respiratory status, vital signs and MEWS score remained stable. Patient was resting quietly with no signs of distress noted. Bed locked and in lowest position. Call bell water and personal belongings were within reach. Patient had no questions, comments or concerns after bedside shift report. Bedside report given to TEQUILA Joyce R.N. [facility] Initial OT Evaulation       08/09/21 1301   Patient Data   Rehab Impairment Impairment of mobility, safety, Activities of Daily Living (ADLs), and cognitive/communication skills due to Stroke:  01 2  Right Body Involvement (Left Brain)   Etiologic Diagnosis  Bilateral Multifocal Infarcts   Date of Onset 07/31/21   Support System   Name Jesus Thomas   Relationship spouse   Able to provide 24 hour supervision Yes  (per chart review, recommend confirming with family)   Able to provide physical help? Yes   Multiple Support Systems Yes   Support System 2   Name 2 Mima Taveras   Relationship 2 daughter   Home Setup   Type of Home Single Level   Number of Stairs 1   First Floor Bathroom Tub; Shower  (per chart review, recommend confirming with spouse)   Available Equipment   (None per chart review, recommend confirming with spouse)   Prior IADL Participation   Meal Preparation Full Participation  ((I) PTA per chart review)   Laundry Full Participation  ((I) PTA per chart review)   Home Cleaning Full Participation  ((I) PTA per chart review)   Prior Level of Function   Self-Care 3  Independent - Patient completed the activities by him/herself, with or without an assistive device, with no assistance from a helper  Indoor-Mobility (Ambulation) 3  Independent - Patient completed the activities by him/herself, with or without an assistive device, with no assistance from a helper  Stairs 3  Independent - Patient completed the activities by him/herself, with or without an assistive device, with no assistance from a helper  Prior Device Used Z  None of the above   Falls in the Last Year   Number of falls in the past 12 months 0   Psychosocial   Psychosocial (WDL) WDL   Restrictions/Precautions   Precautions Aspiration;Bed/chair alarms;Cognitive; Fall Risk;Impulsive;O2;[place]; Supervision on toilet/commode   Pain Assessment   Pain Assessment Tool Pain Assessment not indicated - pt denies pain   Pain Score No Pain   Oral Hygiene   Comment OT to assess next session as able   Reason if not Attempted Refused to perform   Oral Hygiene CARE Score 7   Tub/Shower Transfer   Reason Not Assessed Safety   Findings Not attempted 2* increased lethargy   Shower/Bathe Self   Comment despite encouragement, pt refusing to complete 2* extreme fatigue, OT to assess as able next session   Reason if not Attempted Refused to perform   Shower/Bathe Self CARE Score 7   Dressing/Undressing [id number]   Type of Assistance Needed Physical assistance   Physical Assistance Level 26%-50%   Comment modA to steady in sitting and fully pull shirt down over trunk   Upper Body Dressing CARE Score 3   Comment despite encouragement pt declining to complete task 2* extreme fatigue, OT to assess next session as able   Reason if not Attempted Refused to perform   Lower Body Dressing CARE Score 7   Putting On/Taking Off Footwear   Comment OT to assess next session as able   Reason if not Attempted Refused to perform   Putting On/Taking Off Footwear CARE Score 7   [address] OT to assess next session as able   Reason if not Attempted Refused to perform   [address] Score 7   Toilet Transfer   Comment OT to assess as able next session   Reason if not Attempted Refused to perform   Toilet Transfer CARE Score 7   Transfer Bed/Chair/Wheelchair   Comment OT to assess next session as able   Reason if not Attempted Refused to perform   Chair/Bed-to-Chair Transfer CARE Score 7   Sit to Lying   Type of Assistance Needed Physical assistance   Physical Assistance Level 51%-75%   Comment assistance for BLE   Sit to Lying CARE Score 2   Lying to Sitting on Side of Bed   Type of Assistance Needed Physical assistance   Physical Assistance Level 51%-75%   Comment assistance at trunk and BLE   Lying to Sitting on Side of Bed CARE Score 2   Sit to Stand   Comment OT to assess next session as able   Reason if not Attempted Refused to perform   Sit to Stand CARE Score 7   Comprehension   Assist Devices Glasses  (reports for reading)   QI: Comprehension 2  Sometimes Understands: Understands only basic conversations or simple, direct phrases  Frequently requires cues to understand   Comprehension (FIM) 3 - Understands basic info/conversation 50-74% of time   Expression   QI: Expression 3  Exhibits some difficulty with expressing needs and ideas (e g , some words or finishing thoughts) or speech is not clear   Expression (FIM) 4 - Expresses basic info/needs 75-90% of time   RUE Assessment   RUE Assessment   (difficulty formally assessing 2* lethargy, TBA further)   LUE Assessment   LUE Assessment   (difficulty formally assessing 2* lethargy, TBA further)   Cognition   Overall Cognitive Status Impaired   Arousal/Participation Lethargic;Responsive   Attention   (impaired)   Orientation Level Oriented to person  (stating month as January; unable to state place)   Memory Decreased short term memory;Decreased recall of recent events;Decreased recall of precautions   Following Commands   (Inconsistently following one step commands)   Discharge Information   Vocational Plan Retired/not working   Patient's Discharge Plan Home with family support and assistance   Patient's Rehab Expectations unable to verbalize at this time   Barriers to Discharge Home Decreased Cognitive Function;Decreased Strength;Decreased Endurance; Safety Considerations   Impressions 73year old male that presented to [place] [address] on 7/31/2021 as a pre-hospital stroke alert for complaints of dysarthria, right facial droop and right sided weakness  CT of the brain showed recent infarct left caudate head, anterior lentiform nucleus and anterior limb of left internal capsule without evidence of hemorrhage  CTA of the head/neck showed no occlusion or stenosis, however did reveal mass right nasopharynx and right upper lobe lung mass   MRI of the brain showed multifocal diffusion abnormalities in several separate vascular territories, largest in left MCA territory indicative of multifocal acute/recent infarctions most likely from central embolic source  B/L LE venous duplex study showing RLE DVT in posterior tibial and peroneal veins, LLE DVT in peroneal veins  Pulmonary was consulted due to increased O2 needs/hypoxia, likely caused by PE with possible right heart strain  ECHO showed an EF of 40%, grade 1 diastolic dysfunction, RV mildly to moderately dilated with hypokinesis, RA mildly dilated, mild TVR and small pericardial effusion  Pt is to undergo repeat ECHO in 4-6 weeks to assess RV  Pt was with noted urinary retention during hospital course, and required cadet catheter insertion on 8/6  Pt underwent lung biopsy 8/5  Oncology was consulted on 8/7, with initial clinical staging appearing to be compatible with at least stage IIIA lung cancer  Pt is recommended for an outpatient PET-CT scan to give better idea about exact staging  Upon initial OT evaluation, pt presenting with increased lethargy, with difficulty maintaining alertness, keeping eyes open, and answering OT questions, presenting with confusion  Vitals WNL  RN and MD made aware  Pt oriented to person, difficulty stating date and stated month as "January," and place as "a modern type place " When asked if he was at a hospital or a house pt stated house  When asked what brought pt to hospital, pt stating "because the dogs were hungry " Due to impaired cognition, recommending therapy team clarify home setup and SUP/assistance available upon d/c with spouse  Pt declining majority of tasks at eval despite encouragement 2* extreme fatigue  In next session, OT to assess: oral hygiene, bathing, LB dressing, toileting, and functional transfers as able   Pt presenting with lethargy, impaired sitting balance, impaired activity tolerance, impaired functional strength and impaired cognition that are impacting his ability to complete functional transfers/mobility and ADLs independently   Pt would benefit from 2-3week LOS to achieve SUP functional level upon d/c    OT Therapy Minutes   OT Time In 1300   OT Time Out 1330   OT Total Time (minutes) 30   OT Mode of treatment - Individual (minutes) 30   OT Mode of treatment - Concurrent (minutes) 0   OT Mode of treatment - Group (minutes) 0   OT Mode of treatment - Co-treat (minutes) 0   OT Mode of Treatment - Total time(minutes) 30 minutes   OT Cumulative Minutes 30   Cumulative Minutes   Cumulative therapy minutes 135

## 2021-08-20 ENCOUNTER — HOSPITAL ENCOUNTER (EMERGENCY)
Age: 51
Discharge: HOME | End: 2021-08-20
Attending: EMERGENCY MEDICINE
Payer: MEDICARE

## 2021-08-20 ENCOUNTER — APPOINTMENT (OUTPATIENT)
Dept: GENERAL RADIOLOGY | Age: 51
End: 2021-08-20
Attending: EMERGENCY MEDICINE
Payer: MEDICARE

## 2021-08-20 VITALS
WEIGHT: 174.16 LBS | DIASTOLIC BLOOD PRESSURE: 79 MMHG | BODY MASS INDEX: 27.34 KG/M2 | OXYGEN SATURATION: 100 % | HEART RATE: 83 BPM | HEIGHT: 67 IN | TEMPERATURE: 98.4 F | SYSTOLIC BLOOD PRESSURE: 183 MMHG | RESPIRATION RATE: 17 BRPM

## 2021-08-20 DIAGNOSIS — Z99.2 ESRD (END STAGE RENAL DISEASE) ON DIALYSIS (HCC): Primary | ICD-10-CM

## 2021-08-20 DIAGNOSIS — R19.7 DIARRHEA, UNSPECIFIED TYPE: ICD-10-CM

## 2021-08-20 DIAGNOSIS — N18.6 ESRD (END STAGE RENAL DISEASE) ON DIALYSIS (HCC): Primary | ICD-10-CM

## 2021-08-20 DIAGNOSIS — E16.2 HYPOGLYCEMIA: ICD-10-CM

## 2021-08-20 LAB
ALBUMIN SERPL-MCNC: 2.9 G/DL (ref 3.4–5)
ALBUMIN/GLOB SERPL: 0.7 {RATIO} (ref 0.8–1.7)
ALP SERPL-CCNC: 71 U/L (ref 45–117)
ALT SERPL-CCNC: 14 U/L (ref 16–61)
ANION GAP SERPL CALC-SCNC: 6 MMOL/L (ref 3–18)
AST SERPL-CCNC: 22 U/L (ref 10–38)
ATRIAL RATE: 101 BPM
BASOPHILS # BLD: 0.1 K/UL (ref 0–0.1)
BASOPHILS NFR BLD: 1 % (ref 0–2)
BILIRUB SERPL-MCNC: 0.4 MG/DL (ref 0.2–1)
BUN SERPL-MCNC: 56 MG/DL (ref 7–18)
BUN/CREAT SERPL: 4 (ref 12–20)
C DIFF MOLECULAR, CDTTNP: NEGATIVE
CALCIUM SERPL-MCNC: 7.6 MG/DL (ref 8.5–10.1)
CALCULATED P AXIS, ECG09: -28 DEGREES
CALCULATED R AXIS, ECG10: 46 DEGREES
CALCULATED T AXIS, ECG11: -7 DEGREES
CHLORIDE SERPL-SCNC: 112 MMOL/L (ref 100–111)
CO2 SERPL-SCNC: 22 MMOL/L (ref 21–32)
CREAT SERPL-MCNC: 12.5 MG/DL (ref 0.6–1.3)
DIAGNOSIS, 93000: NORMAL
DIFFERENTIAL METHOD BLD: ABNORMAL
EOSINOPHIL # BLD: 0.3 K/UL (ref 0–0.4)
EOSINOPHIL NFR BLD: 6 % (ref 0–5)
ERYTHROCYTE [DISTWIDTH] IN BLOOD BY AUTOMATED COUNT: 21.1 % (ref 11.6–14.5)
GLOBULIN SER CALC-MCNC: 4.4 G/DL (ref 2–4)
GLUCOSE BLD STRIP.AUTO-MCNC: 135 MG/DL (ref 70–110)
GLUCOSE SERPL-MCNC: 51 MG/DL (ref 74–99)
HCT VFR BLD AUTO: 40.8 % (ref 36–48)
HGB BLD-MCNC: 13.3 G/DL (ref 13–16)
INTERPRETATION: ABNORMAL
LYMPHOCYTES # BLD: 0.6 K/UL (ref 0.9–3.6)
LYMPHOCYTES NFR BLD: 13 % (ref 21–52)
MCH RBC QN AUTO: 25.9 PG (ref 24–34)
MCHC RBC AUTO-ENTMCNC: 32.6 G/DL (ref 31–37)
MCV RBC AUTO: 79.4 FL (ref 74–97)
MONOCYTES # BLD: 0.6 K/UL (ref 0.05–1.2)
MONOCYTES NFR BLD: 12 % (ref 3–10)
NEUTS SEG # BLD: 3.3 K/UL (ref 1.8–8)
NEUTS SEG NFR BLD: 68 % (ref 40–73)
P-R INTERVAL, ECG05: 146 MS
PCR REFLEX: ABNORMAL
PLATELET # BLD AUTO: 99 K/UL (ref 135–420)
PLATELET COMMENTS,PCOM: ABNORMAL
POTASSIUM SERPL-SCNC: 5.8 MMOL/L (ref 3.5–5.5)
PROT SERPL-MCNC: 7.3 G/DL (ref 6.4–8.2)
Q-T INTERVAL, ECG07: 364 MS
QRS DURATION, ECG06: 86 MS
QTC CALCULATION (BEZET), ECG08: 471 MS
RBC # BLD AUTO: 5.14 M/UL (ref 4.35–5.65)
RBC MORPH BLD: ABNORMAL
SODIUM SERPL-SCNC: 140 MMOL/L (ref 136–145)
VENTRICULAR RATE, ECG03: 101 BPM
WBC # BLD AUTO: 4.9 K/UL (ref 4.6–13.2)

## 2021-08-20 PROCEDURE — 87449 NOS EACH ORGANISM AG IA: CPT

## 2021-08-20 PROCEDURE — 74011000250 HC RX REV CODE- 250

## 2021-08-20 PROCEDURE — 99285 EMERGENCY DEPT VISIT HI MDM: CPT

## 2021-08-20 PROCEDURE — 96375 TX/PRO/DX INJ NEW DRUG ADDON: CPT

## 2021-08-20 PROCEDURE — 74011250636 HC RX REV CODE- 250/636: Performed by: EMERGENCY MEDICINE

## 2021-08-20 PROCEDURE — 74018 RADEX ABDOMEN 1 VIEW: CPT

## 2021-08-20 PROCEDURE — 74011250637 HC RX REV CODE- 250/637: Performed by: EMERGENCY MEDICINE

## 2021-08-20 PROCEDURE — 80053 COMPREHEN METABOLIC PANEL: CPT

## 2021-08-20 PROCEDURE — 71045 X-RAY EXAM CHEST 1 VIEW: CPT

## 2021-08-20 PROCEDURE — 87493 C DIFF AMPLIFIED PROBE: CPT

## 2021-08-20 PROCEDURE — 96365 THER/PROPH/DIAG IV INF INIT: CPT

## 2021-08-20 PROCEDURE — 93005 ELECTROCARDIOGRAM TRACING: CPT

## 2021-08-20 PROCEDURE — 85025 COMPLETE CBC W/AUTO DIFF WBC: CPT

## 2021-08-20 PROCEDURE — 74011000250 HC RX REV CODE- 250: Performed by: EMERGENCY MEDICINE

## 2021-08-20 PROCEDURE — 82962 GLUCOSE BLOOD TEST: CPT

## 2021-08-20 RX ORDER — DEXTROSE 50 % IN WATER (D50W) INTRAVENOUS SYRINGE
Status: COMPLETED
Start: 2021-08-20 | End: 2021-08-20

## 2021-08-20 RX ORDER — DICYCLOMINE HYDROCHLORIDE 10 MG/1
10 CAPSULE ORAL
Status: COMPLETED | OUTPATIENT
Start: 2021-08-20 | End: 2021-08-20

## 2021-08-20 RX ORDER — DEXTROSE 50 % IN WATER (D50W) INTRAVENOUS SYRINGE
25
Status: COMPLETED | OUTPATIENT
Start: 2021-08-20 | End: 2021-08-20

## 2021-08-20 RX ORDER — DEXTROSE MONOHYDRATE 100 MG/ML
250 INJECTION, SOLUTION INTRAVENOUS ONCE
Status: COMPLETED | OUTPATIENT
Start: 2021-08-20 | End: 2021-08-20

## 2021-08-20 RX ORDER — LABETALOL HCL 20 MG/4 ML
20 SYRINGE (ML) INTRAVENOUS ONCE
Status: COMPLETED | OUTPATIENT
Start: 2021-08-20 | End: 2021-08-20

## 2021-08-20 RX ORDER — LOPERAMIDE HYDROCHLORIDE 2 MG/1
2 CAPSULE ORAL ONCE
Status: COMPLETED | OUTPATIENT
Start: 2021-08-20 | End: 2021-08-20

## 2021-08-20 RX ADMIN — DEXTROSE 50 % IN WATER (D50W) INTRAVENOUS SYRINGE 25 G: at 15:06

## 2021-08-20 RX ADMIN — DEXTROSE MONOHYDRATE 250 ML: 10 INJECTION, SOLUTION INTRAVENOUS at 15:10

## 2021-08-20 RX ADMIN — DICYCLOMINE HYDROCHLORIDE 10 MG: 10 CAPSULE ORAL at 13:56

## 2021-08-20 RX ADMIN — LABETALOL HYDROCHLORIDE 20 MG: 5 INJECTION, SOLUTION INTRAVENOUS at 14:54

## 2021-08-20 RX ADMIN — DEXTROSE MONOHYDRATE 25 G: 25 INJECTION, SOLUTION INTRAVENOUS at 15:06

## 2021-08-20 RX ADMIN — LOPERAMIDE HYDROCHLORIDE 2 MG: 2 CAPSULE ORAL at 13:56

## 2021-08-20 NOTE — ED PROVIDER NOTES
EMERGENCY DEPARTMENT HISTORY AND PHYSICAL EXAM    Date: 8/20/2021  Patient Name: Clifford Bonilla. History of Presenting Illness     Chief Complaint   Patient presents with    Diarrhea         History Provided By: Patient and EMS    Clifford Minaya is a 54 y.o. male who presents to the emergency department C/O diarrhea. Patient presents by EMS from his dialysis center for this problem. He was about to get his dialysis when he had a bout of profuse watery diarrhea. States he was unable to get his dialysis as he was brought to our facility. He states he is dealt with his dialysis problem for years. States he has a lot of watery diarrhea. Notes that he has some intermittent abdominal cramping but denies any nausea or vomiting. .  Patient is a poor historian does not provide much history beyond his current complaint        PCP: Blaine Gambino MD    Current Outpatient Medications   Medication Sig Dispense Refill    fidaxomicin (DIFICID) 200 mg tab tablet Take 1 Tab by mouth two (2) times a day. 14 Tab 0    doxazosin (CARDURA) 4 mg tablet Take 2 mg by mouth daily.  sodium hypochlorite 0.0125% (DAKINS SOLUTION) Apply 1 mL to affected area two (2) times a week.  omeprazole (PRILOSEC) 40 mg capsule Take 40 mg by mouth daily.  sevelamer carbonate (RENVELA) 800 mg tab tab Take 800 mg by mouth three (3) times daily (with meals).  sodium bicarbonate 650 mg tablet Take 650 mg by mouth three (3) times daily.  cefazolin sodium/dextrose,iso (CEFAZOLIN IN DEXTROSE, ISO-OS,) 2 gram/50 mL pgbk 50 mL by IntraVENous route every Monday. Mwf, 2-2-3 g after HD 50 mL 0    amLODIPine (NORVASC) 10 mg tablet Take 1 Tab by mouth daily. 30 Tab 0    carvedilol (COREG) 25 mg tablet Take 1 Tab by mouth two (2) times daily (with meals). 60 Tab 0    hydrALAZINE (APRESOLINE) 25 mg tablet Take 1 Tab by mouth three (3) times daily.  90 Tab 0    isosorbide mononitrate ER (IMDUR) 60 mg CR tablet Take 1 Tab by mouth daily. 30 Tab 0    furosemide (LASIX) 80 mg tablet Take 1 Tab by mouth two (2) times a day. 61 Tab 0       Past History     Past Medical History:  Past Medical History:   Diagnosis Date    Chronic kidney disease     Diabetes ([facility] [address]     [facility]     Hypertension     Sickle cell trait ([facility]        Past Surgical History:  Past Surgical History:   Procedure Laterality Date    HX ORTHOPAEDIC      right BKA 2017       Family History:  No family history on file. Social History:  Social History     Tobacco Use    Smoking status: Former Smoker    Smokeless tobacco: Never Used   Substance Use Topics    Alcohol use: No    Drug use: No       Allergies: Allergies   Allergen Reactions    Penicillins Rash    Vancomycin Other (comments)     kayla syndrome         Review of Systems   Review of Systems   Constitutional: Positive for fatigue. Respiratory: Negative for shortness of breath. Cardiovascular: Negative for chest pain. Gastrointestinal: Positive for abdominal pain and diarrhea. Negative for nausea and vomiting. All other systems reviewed and are negative. All other systems reviewed and are negative.     Physical Exam     Vitals:    08/20/21 1415 08/20/21 1445 08/20/21 1630 08/20/21 1700   BP: (!) 189/84 (!) 211/98 (!) 151/67 (!) 183/79   Pulse: 98 (!) 115 80 83   Resp: 21 24 18 17   Temp:       SpO2: 98% 93% 97% 100%   Weight:       Height:         Physical Exam    Nursing notes and vital signs reviewed    Constitutional: Chronically ill-appearing, non toxic appearing, no acute distress, appearing stated age  Eyes: PERRL, EOMI, No conjunctival injection  ENT: external ears normal, No rhinorrhea, external nose normal, mucous membranes moist  Cardiovascular: Regular rate and rhythm, no murmurs, No JVD, left upper extremity AV fistula in place with good thrill  Respiratory: Clear to ausculation bilaterally, No stridor, Normal work of breathing and chest excursion bilaterally  Abdomen: Soft, non tender, non distended, normoactive bowel sounds, No rigidity, no peritoneal signs  Musculoskeletal:  No evidence of obvious injury to Head, Neck, Back, Extremities, no LE edema,   Skin: Warm, dry, No obvious rashes, chronic wound on his left heel  Neuro: Fatigued appearing but awake oriented x 3, CN 2-12 intact, normal speech, strength and sensation full and symmetric bilaterally  Psychiatric: Normal mood and affect      Diagnostic Study Results     Labs -     Recent Results (from the past 72 hour(s))   CBC WITH AUTOMATED DIFF    Collection Time: 08/20/21  1:48 PM   Result Value Ref Range    WBC 4.9 4.6 - 13.2 K/uL    RBC 5.14 4.35 - 5.65 M/uL    HGB 13.3 13.0 - 16.0 g/dL    HCT 40.8 36.0 - 48.0 %    MCV 79.4 74.0 - 97.0 FL    MCH 25.9 24.0 - 34.0 PG    MCHC 32.6 31.0 - 37.0 g/dL    RDW 21.1 (H) 11.6 - 14.5 %    PLATELET 99 (L) 289 - 420 K/uL    NEUTROPHILS 68 40 - 73 %    LYMPHOCYTES 13 (L) 21 - 52 %    MONOCYTES 12 (H) 3 - 10 %    EOSINOPHILS 6 (H) 0 - 5 %    BASOPHILS 1 0 - 2 %    ABS. NEUTROPHILS 3.3 1.8 - 8.0 K/UL    ABS. LYMPHOCYTES 0.6 (L) 0.9 - 3.6 K/UL    ABS. MONOCYTES 0.6 0.05 - 1.2 K/UL    ABS. EOSINOPHILS 0.3 0.0 - 0.4 K/UL    ABS.  BASOPHILS 0.1 0.0 - 0.1 K/UL    DF AUTOMATED      PLATELET COMMENTS DECREASED PLATELETS      RBC COMMENTS NORMOCYTIC, NORMOCHROMIC     EKG, 12 LEAD, INITIAL    Collection Time: 08/20/21  1:59 PM   Result Value Ref Range    Ventricular Rate 101 BPM    Atrial Rate 101 BPM    P-R Interval 146 ms    QRS Duration 86 ms    Q-T Interval 364 ms    QTC Calculation (Bezet) 471 ms    Calculated P Axis -28 degrees    Calculated R Axis 46 degrees    Calculated T Axis -7 degrees    Diagnosis       Sinus tachycardia  Cannot rule out Anterior infarct , age undetermined  Abnormal ECG  When compared with ECG of 03-JUN-2021 19:22,  Nonspecific T wave abnormality now evident in Inferior leads  Confirmed by Tawana Holliday MD, -- ([id number]) on 8/20/2021 5:15:37 PM METABOLIC PANEL, COMPREHENSIVE    Collection Time: 08/20/21  2:15 PM   Result Value Ref Range    Sodium 140 136 - 145 mmol/L    Potassium 5.8 (H) 3.5 - 5.5 mmol/L    Chloride 112 (H) 100 - 111 mmol/L    CO2 22 21 - 32 mmol/L    Anion gap 6 3.0 - 18 mmol/L    Glucose 51 (LL) 74 - 99 mg/dL    BUN 56 (H) 7.0 - 18 MG/DL    Creatinine 12.50 (H) 0.6 - 1.3 MG/DL    BUN/Creatinine ratio 4 (L) 12 - 20      GFR est AA 5 (L) >60 ml/min/1.73m2    GFR est non-AA 4 (L) >60 ml/min/1.73m2    Calcium 7.6 (L) 8.5 - 10.1 MG/DL    Bilirubin, total 0.4 0.2 - 1.0 MG/DL    ALT (SGPT) 14 (L) 16 - 61 U/L    AST (SGOT) 22 10 - 38 U/L    Alk. phosphatase 71 45 - 117 U/L    Protein, total 7.3 6.4 - 8.2 g/dL    Albumin 2.9 (L) 3.4 - 5.0 g/dL    Globulin 4.4 (H) 2.0 - 4.0 g/dL    A-G Ratio 0.7 (L) 0.8 - 1.7     GLUCOSE, POC    Collection Time: 08/20/21  3:44 PM   Result Value Ref Range    Glucose (POC) 135 (H) 70 - 110 mg/dL       Radiologic Studies -   XR CHEST PORT   Final Result      No acute cardiopulmonary abnormality. XR ABD (KUB)   Final Result      No significant abnormality. CT Results  (Last 48 hours)    None        CXR Results  (Last 48 hours)               08/20/21 1513  XR CHEST PORT Final result    Impression:      No acute cardiopulmonary abnormality. Narrative:  EXAM: XR CHEST PORT       CLINICAL INDICATION/HISTORY: dialysis   -Additional: None       COMPARISON: 12/22/2020       TECHNIQUE: Portable frontal view of the chest       _______________       FINDINGS:       SUPPORT DEVICES: None. HEART AND MEDIASTINUM: Cardiomediastinal silhouette within normal limits.        LUNGS AND PLEURAL SPACES: No dense consolidation, large effusion or   pneumothorax.       _______________                 Medications given in the ED-  Medications   loperamide (IMODIUM) capsule 2 mg (2 mg Oral Given 8/20/21 3666)   dicyclomine (BENTYL) capsule 10 mg (10 mg Oral Given 8/20/21 1356)   labetaloL (NORMODYNE;TRANDATE) 20 mg/4 mL (5 mg/mL) injection 20 mg (20 mg IntraVENous Given 8/20/21 1454)   dextrose 10% infusion 250 mL (0 mL IntraVENous IV Completed 8/20/21 1638)   dextrose (D50W) injection syrg 25 g (25 g IntraVENous Given 8/20/21 1506)         Medical Decision Making     I reviewed the vital signs, available nursing notes, past medical history, past surgical history, family history and social history. Vital Signs interpretation- I have reviewed the patient's vital signs. Pulse Oximetry interpretation - 100% on Room air     Cardiac Monitor interpretation:  Rate: 107 bpm  Rhythm: sinus    EKG interpretation: (Preliminary)  EKG interpretation by Dr. Thanh Gregory 101 sinus tachycardia, normal axis, nonspecific ST changes in the anterior leads and inferior leads with inverted P and T waves in the inferior leads. There is peaked T waves noted in V2 and V3. It slightly more peaked than prior in June 2021    Records Reviewed: Nursing Notes and Old Medical Records  The patient's medication list does show for dactinomycin. Is difficult to tell if this is an active medication or an old medication. Upon looking in chart review he was seen by [facility] infectious disease specialist back in March with a noted history of C. difficile colitis    Procedures:  Procedures    ED Course and MDM: We will obtain blood work and abdominal x-ray give Imodium and Bentyl and reassess    His EKG findings are concerning for hyperkalemia. Patient was supposed to get dialysis anyway. Will obtain blood work to assess for hyperkalemia    Laboratory findings showing his ESRD otherwise unremarkable. He was noted to be hypoglycemic which is likely the cause of his lethargy. He was given IV dextrose with improvement. His chest and abdominal x-ray appear unremarkable. I discussed with the patient results of his laboratory findings and imaging studies.   I discussed with him that it is imperative that he follows up with his nephrologist.  He states he should be able to get dialysis tomorrow but if he is unable to he should be able to get in on Monday. I recommended he come back to the emergency department if his symptoms worsen. He understands and agrees to plan    Diagnosis and Disposition         DISCHARGE NOTE:    Tressa Scott Jr.'s  results have been reviewed with him. He has been counseled regarding his diagnosis, treatment, and plan. He verbally conveys understanding and agreement of the signs, symptoms, diagnosis, treatment and prognosis and additionally agrees to follow up as discussed. He also agrees with the care-plan and conveys that all of his questions have been answered. I have also provided discharge instructions for him that include: educational information regarding their diagnosis and treatment, and list of reasons why they would want to return to the ED prior to their follow-up appointment, should his condition change. He has been provided with education for proper emergency department utilization. CLINICAL IMPRESSION:    1. ESRD (end stage renal disease) on dialysis ([facility] [address]    2. Diarrhea, unspecified type    3. Hypoglycemia        PLAN:  1. D/C Home  2. Discharge Medication List as of 8/20/2021  4:57 PM        3. Follow-up Information     Follow up With Specialties Details Why Contact Info    Jacki Rodrigues MD Internal Medicine Schedule an appointment as soon as possible for a visit  As needed [address] [facility] [phone]      Your dialysis appointments and your nephrologist  Go to           _______________________________      Please note that this dictation was completed with Nextance, the computer voice recognition software. Quite often unanticipated grammatical, syntax, homophones, and other interpretive errors are inadvertently transcribed by the computer software. Please disregard these errors. Please excuse any errors that have escaped final proofreading.

## 2021-08-20 NOTE — ED TRIAGE NOTES
Patient arrives via ambulance from dialysis center where patient began having uncontrollable diarrhea in waiting room. Patient was not dialyzed today.

## 2021-08-20 NOTE — DISCHARGE INSTRUCTIONS
Stay well-hydrated, keep taking your medications as usual and make sure you go to your dialysis appointment as soon as you can

## 2021-08-26 ENCOUNTER — HOSPITAL ENCOUNTER (INPATIENT)
Age: 51
LOS: 10 days | Discharge: HOME HEALTH | DRG: 640 | End: 2021-09-06
Attending: EMERGENCY MEDICINE | Admitting: INTERNAL MEDICINE
Payer: MEDICARE

## 2021-08-26 ENCOUNTER — APPOINTMENT (OUTPATIENT)
Dept: CT IMAGING | Age: 51
DRG: 640 | End: 2021-08-26
Attending: EMERGENCY MEDICINE
Payer: MEDICARE

## 2021-08-26 ENCOUNTER — APPOINTMENT (OUTPATIENT)
Dept: GENERAL RADIOLOGY | Age: 51
DRG: 640 | End: 2021-08-26
Attending: EMERGENCY MEDICINE
Payer: MEDICARE

## 2021-08-26 DIAGNOSIS — N19 UREMIA, ACUTE: ICD-10-CM

## 2021-08-26 DIAGNOSIS — N18.9 ACUTE RENAL FAILURE SUPERIMPOSED ON CHRONIC KIDNEY DISEASE, ON CHRONIC DIALYSIS, UNSPECIFIED ACUTE RENAL FAILURE TYPE (HCC): ICD-10-CM

## 2021-08-26 DIAGNOSIS — E11.649 TYPE 2 DIABETES MELLITUS WITH HYPOGLYCEMIA WITHOUT COMA, UNSPECIFIED WHETHER LONG TERM INSULIN USE (HCC): Primary | ICD-10-CM

## 2021-08-26 DIAGNOSIS — N17.9 ACUTE RENAL FAILURE SUPERIMPOSED ON CHRONIC KIDNEY DISEASE, ON CHRONIC DIALYSIS, UNSPECIFIED ACUTE RENAL FAILURE TYPE (HCC): ICD-10-CM

## 2021-08-26 DIAGNOSIS — Z99.2 ACUTE RENAL FAILURE SUPERIMPOSED ON CHRONIC KIDNEY DISEASE, ON CHRONIC DIALYSIS, UNSPECIFIED ACUTE RENAL FAILURE TYPE (HCC): ICD-10-CM

## 2021-08-26 DIAGNOSIS — I16.9 HYPERTENSIVE CRISIS: ICD-10-CM

## 2021-08-26 LAB
ALBUMIN SERPL-MCNC: 3.3 G/DL (ref 3.4–5)
ALBUMIN/GLOB SERPL: 0.6 {RATIO} (ref 0.8–1.7)
ALP SERPL-CCNC: 84 U/L (ref 45–117)
ALT SERPL-CCNC: 26 U/L (ref 16–61)
ANION GAP SERPL CALC-SCNC: 21 MMOL/L (ref 3–18)
AST SERPL-CCNC: 8 U/L (ref 10–38)
BASOPHILS # BLD: 0 K/UL (ref 0–0.1)
BASOPHILS NFR BLD: 0 % (ref 0–2)
BILIRUB SERPL-MCNC: 0.5 MG/DL (ref 0.2–1)
BUN SERPL-MCNC: 135 MG/DL (ref 7–18)
BUN/CREAT SERPL: 7 (ref 12–20)
CALCIUM SERPL-MCNC: 7.3 MG/DL (ref 8.5–10.1)
CHLORIDE SERPL-SCNC: 108 MMOL/L (ref 100–111)
CK MB CFR SERPL CALC: 3 % (ref 0–4)
CK MB SERPL-MCNC: 4 NG/ML (ref 5–25)
CK SERPL-CCNC: 133 U/L (ref 39–308)
CO2 SERPL-SCNC: 8 MMOL/L (ref 21–32)
CREAT SERPL-MCNC: 20.6 MG/DL (ref 0.6–1.3)
DIFFERENTIAL METHOD BLD: ABNORMAL
EOSINOPHIL # BLD: 0 K/UL (ref 0–0.4)
EOSINOPHIL NFR BLD: 0 % (ref 0–5)
ERYTHROCYTE [DISTWIDTH] IN BLOOD BY AUTOMATED COUNT: 20.3 % (ref 11.6–14.5)
GLOBULIN SER CALC-MCNC: 5.1 G/DL (ref 2–4)
GLUCOSE BLD STRIP.AUTO-MCNC: 125 MG/DL (ref 70–110)
GLUCOSE BLD STRIP.AUTO-MCNC: 71 MG/DL (ref 70–110)
GLUCOSE SERPL-MCNC: 129 MG/DL (ref 74–99)
HCT VFR BLD AUTO: 35.8 % (ref 36–48)
HGB BLD-MCNC: 11.6 G/DL (ref 13–16)
LYMPHOCYTES # BLD: 0.3 K/UL (ref 0.9–3.6)
LYMPHOCYTES NFR BLD: 8 % (ref 21–52)
MAGNESIUM SERPL-MCNC: 3.1 MG/DL (ref 1.6–2.6)
MCH RBC QN AUTO: 25.4 PG (ref 24–34)
MCHC RBC AUTO-ENTMCNC: 32.4 G/DL (ref 31–37)
MCV RBC AUTO: 78.3 FL (ref 78–100)
MONOCYTES # BLD: 0.3 K/UL (ref 0.05–1.2)
MONOCYTES NFR BLD: 9 % (ref 3–10)
NEUTS SEG # BLD: 2.8 K/UL (ref 1.8–8)
NEUTS SEG NFR BLD: 83 % (ref 40–73)
PHOSPHATE SERPL-MCNC: 13.2 MG/DL (ref 2.5–4.9)
PLATELET # BLD AUTO: 121 K/UL (ref 135–420)
POTASSIUM SERPL-SCNC: 7.1 MMOL/L (ref 3.5–5.5)
PROT SERPL-MCNC: 8.4 G/DL (ref 6.4–8.2)
RBC # BLD AUTO: 4.57 M/UL (ref 4.35–5.65)
RBC MORPH BLD: ABNORMAL
SODIUM SERPL-SCNC: 137 MMOL/L (ref 136–145)
TROPONIN I SERPL-MCNC: 0.02 NG/ML (ref 0–0.04)
WBC # BLD AUTO: 3.4 K/UL (ref 4.6–13.2)

## 2021-08-26 PROCEDURE — 72125 CT NECK SPINE W/O DYE: CPT

## 2021-08-26 PROCEDURE — 94761 N-INVAS EAR/PLS OXIMETRY MLT: CPT

## 2021-08-26 PROCEDURE — 83036 HEMOGLOBIN GLYCOSYLATED A1C: CPT

## 2021-08-26 PROCEDURE — 87205 SMEAR GRAM STAIN: CPT

## 2021-08-26 PROCEDURE — 71045 X-RAY EXAM CHEST 1 VIEW: CPT

## 2021-08-26 PROCEDURE — 84100 ASSAY OF PHOSPHORUS: CPT

## 2021-08-26 PROCEDURE — 87077 CULTURE AEROBIC IDENTIFY: CPT

## 2021-08-26 PROCEDURE — 74011000250 HC RX REV CODE- 250: Performed by: EMERGENCY MEDICINE

## 2021-08-26 PROCEDURE — 0202U NFCT DS 22 TRGT SARS-COV-2: CPT

## 2021-08-26 PROCEDURE — 80053 COMPREHEN METABOLIC PANEL: CPT

## 2021-08-26 PROCEDURE — 96374 THER/PROPH/DIAG INJ IV PUSH: CPT

## 2021-08-26 PROCEDURE — 93005 ELECTROCARDIOGRAM TRACING: CPT

## 2021-08-26 PROCEDURE — 85025 COMPLETE CBC W/AUTO DIFF WBC: CPT

## 2021-08-26 PROCEDURE — 70450 CT HEAD/BRAIN W/O DYE: CPT

## 2021-08-26 PROCEDURE — 96375 TX/PRO/DX INJ NEW DRUG ADDON: CPT

## 2021-08-26 PROCEDURE — 87186 SC STD MICRODIL/AGAR DIL: CPT

## 2021-08-26 PROCEDURE — 83735 ASSAY OF MAGNESIUM: CPT

## 2021-08-26 PROCEDURE — 74011000250 HC RX REV CODE- 250

## 2021-08-26 PROCEDURE — 82962 GLUCOSE BLOOD TEST: CPT

## 2021-08-26 PROCEDURE — 99285 EMERGENCY DEPT VISIT HI MDM: CPT

## 2021-08-26 PROCEDURE — 82553 CREATINE MB FRACTION: CPT

## 2021-08-26 PROCEDURE — 74011250636 HC RX REV CODE- 250/636: Performed by: EMERGENCY MEDICINE

## 2021-08-26 PROCEDURE — 87153 DNA/RNA SEQUENCING: CPT

## 2021-08-26 RX ORDER — DEXTROSE 50 % IN WATER (D50W) INTRAVENOUS SYRINGE
25
Status: DISCONTINUED | OUTPATIENT
Start: 2021-08-26 | End: 2021-08-27

## 2021-08-26 RX ORDER — LORAZEPAM 2 MG/ML
1 INJECTION INTRAMUSCULAR
Status: COMPLETED | OUTPATIENT
Start: 2021-08-26 | End: 2021-08-26

## 2021-08-26 RX ORDER — SODIUM CHLORIDE 0.9 % (FLUSH) 0.9 %
5-10 SYRINGE (ML) INJECTION AS NEEDED
Status: DISCONTINUED | OUTPATIENT
Start: 2021-08-26 | End: 2021-08-29

## 2021-08-26 RX ORDER — LIDOCAINE HYDROCHLORIDE 20 MG/ML
JELLY TOPICAL
Status: COMPLETED
Start: 2021-08-26 | End: 2021-08-26

## 2021-08-26 RX ORDER — FUROSEMIDE 10 MG/ML
40 INJECTION INTRAMUSCULAR; INTRAVENOUS
Status: COMPLETED | OUTPATIENT
Start: 2021-08-26 | End: 2021-08-26

## 2021-08-26 RX ORDER — LABETALOL HCL 20 MG/4 ML
20 SYRINGE (ML) INTRAVENOUS
Status: COMPLETED | OUTPATIENT
Start: 2021-08-26 | End: 2021-08-26

## 2021-08-26 RX ORDER — ALBUTEROL SULFATE 0.83 MG/ML
2.5 SOLUTION RESPIRATORY (INHALATION)
Status: COMPLETED | OUTPATIENT
Start: 2021-08-26 | End: 2021-08-26

## 2021-08-26 RX ADMIN — FUROSEMIDE 40 MG: 10 INJECTION, SOLUTION INTRAMUSCULAR; INTRAVENOUS at 22:35

## 2021-08-26 RX ADMIN — LABETALOL HYDROCHLORIDE 20 MG: 5 INJECTION, SOLUTION INTRAVENOUS at 22:36

## 2021-08-26 RX ADMIN — ALBUTEROL SULFATE 2.5 MG: 2.5 SOLUTION RESPIRATORY (INHALATION) at 22:36

## 2021-08-26 RX ADMIN — LORAZEPAM 1 MG: 2 INJECTION INTRAMUSCULAR; INTRAVENOUS at 23:44

## 2021-08-26 RX ADMIN — LIDOCAINE HYDROCHLORIDE: 20 JELLY TOPICAL at 22:50

## 2021-08-26 NOTE — Clinical Note
Status[de-identified] INPATIENT [101]   Type of Bed: Intensive Care [6]   Cardiac Monitoring Required?: Yes   Inpatient Hospitalization Certified Necessary for the Following Reasons: 4.  Patient requires ICU level of care interventions (further clarification in H&P documentation)   Admitting Diagnosis: Hypoglycemia associated with diabetes ([facility] [address] [[id number]]   Admitting Diagnosis: Uremia [718930]   Admitting Diagnosis: Non-compliance with renal dialysis [facility] [[id number]]   Admitting Diagnosis: Hyperkalemia [[id number]]   Admitting Diagnosis: Hypertensive crisis [781440]   Admitting Physician: Calvin Patel [[id number]]   Attending Physician: Calvin Patel [[id number]]   Estimated Length of Stay: 2 Midnights   Discharge Plan[de-identified] Home with Office Follow-up

## 2021-08-26 NOTE — ED TRIAGE NOTES
Pt arrived via ems. Pt was found with AMS at his brothers home and blood sugar noted to be 43. Ems gave 2 oral glucose and BS increased to 51 then a 3rd PO glucose was given and bs increased to 75. On arrival pt noted with tremors/shakes. pt uses a w/c and has right BKA. Hx of DM. Per ems /110. Pt states he had a fall this AM and hit his head.

## 2021-08-27 ENCOUNTER — APPOINTMENT (OUTPATIENT)
Dept: VASCULAR SURGERY | Age: 51
DRG: 640 | End: 2021-08-27
Attending: INTERNAL MEDICINE
Payer: MEDICARE

## 2021-08-27 ENCOUNTER — APPOINTMENT (OUTPATIENT)
Dept: GENERAL RADIOLOGY | Age: 51
DRG: 640 | End: 2021-08-27
Attending: EMERGENCY MEDICINE
Payer: MEDICARE

## 2021-08-27 ENCOUNTER — HOSPITAL ENCOUNTER (INPATIENT)
Dept: MRI IMAGING | Age: 51
Discharge: HOME | DRG: 640 | End: 2021-08-27
Attending: INTERNAL MEDICINE
Payer: MEDICARE

## 2021-08-27 ENCOUNTER — APPOINTMENT (OUTPATIENT)
Dept: MRI IMAGING | Age: 51
DRG: 640 | End: 2021-08-27
Attending: INTERNAL MEDICINE
Payer: MEDICARE

## 2021-08-27 ENCOUNTER — APPOINTMENT (OUTPATIENT)
Dept: NON INVASIVE DIAGNOSTICS | Age: 51
DRG: 640 | End: 2021-08-27
Attending: INTERNAL MEDICINE
Payer: MEDICARE

## 2021-08-27 PROBLEM — Z91.15 NON-COMPLIANCE WITH RENAL DIALYSIS (HCC): Status: ACTIVE | Noted: 2021-08-27

## 2021-08-27 PROBLEM — N19 UREMIA: Status: ACTIVE | Noted: 2021-08-27

## 2021-08-27 PROBLEM — E11.649 HYPOGLYCEMIA ASSOCIATED WITH DIABETES (HCC): Status: ACTIVE | Noted: 2021-08-27

## 2021-08-27 PROBLEM — E87.5 HYPERKALEMIA: Status: ACTIVE | Noted: 2021-08-27

## 2021-08-27 PROBLEM — I16.9 HYPERTENSIVE CRISIS: Status: ACTIVE | Noted: 2021-08-27

## 2021-08-27 LAB
ADMINISTERED INITIALS, ADMINIT: NORMAL
ALBUMIN SERPL-MCNC: 2.8 G/DL (ref 3.4–5)
ALBUMIN/GLOB SERPL: 0.6 {RATIO} (ref 0.8–1.7)
ALP SERPL-CCNC: 73 U/L (ref 45–117)
ALT SERPL-CCNC: 22 U/L (ref 16–61)
ANION GAP SERPL CALC-SCNC: 13 MMOL/L (ref 3–18)
ANION GAP SERPL CALC-SCNC: 16 MMOL/L (ref 3–18)
AST SERPL-CCNC: 8 U/L (ref 10–38)
ATRIAL RATE: 98 BPM
B PERT DNA SPEC QL NAA+PROBE: NOT DETECTED
BASE DEFICIT BLDV-SCNC: 17.7 MMOL/L
BASOPHILS # BLD: 0 K/UL (ref 0–0.1)
BASOPHILS NFR BLD: 0 % (ref 0–2)
BILIRUB SERPL-MCNC: 0.5 MG/DL (ref 0.2–1)
BORDETELLA PARAPERTUSSIS PCR, BORPAR: NOT DETECTED
BUN SERPL-MCNC: 133 MG/DL (ref 7–18)
BUN SERPL-MCNC: 79 MG/DL (ref 7–18)
BUN/CREAT SERPL: 6 (ref 12–20)
BUN/CREAT SERPL: 7 (ref 12–20)
C PNEUM DNA SPEC QL NAA+PROBE: NOT DETECTED
CALCIUM SERPL-MCNC: 7 MG/DL (ref 8.5–10.1)
CALCIUM SERPL-MCNC: 7.5 MG/DL (ref 8.5–10.1)
CALCULATED P AXIS, ECG09: 77 DEGREES
CALCULATED R AXIS, ECG10: -11 DEGREES
CALCULATED T AXIS, ECG11: 58 DEGREES
CHLORIDE SERPL-SCNC: 106 MMOL/L (ref 100–111)
CHLORIDE SERPL-SCNC: 111 MMOL/L (ref 100–111)
CK MB CFR SERPL CALC: 1.7 % (ref 0–4)
CK MB CFR SERPL CALC: 2.2 % (ref 0–4)
CK MB CFR SERPL CALC: 2.7 % (ref 0–4)
CK MB SERPL-MCNC: 1.9 NG/ML (ref 5–25)
CK MB SERPL-MCNC: 2.6 NG/ML (ref 5–25)
CK MB SERPL-MCNC: 3.2 NG/ML (ref 5–25)
CK SERPL-CCNC: 110 U/L (ref 39–308)
CK SERPL-CCNC: 117 U/L (ref 39–308)
CK SERPL-CCNC: 118 U/L (ref 39–308)
CO2 SERPL-SCNC: 19 MMOL/L (ref 21–32)
CO2 SERPL-SCNC: 9 MMOL/L (ref 21–32)
CREAT SERPL-MCNC: 13.1 MG/DL (ref 0.6–1.3)
CREAT SERPL-MCNC: 20.3 MG/DL (ref 0.6–1.3)
D50 ADMINISTERED, D50ADM: 0 ML
D50 ORDER, D50ORD: 0 ML
DIAGNOSIS, 93000: NORMAL
DIFFERENTIAL METHOD BLD: ABNORMAL
ECHO AO ASC DIAM: 3.12 CM
ECHO AV ANNULUS DIAM: 3.08 CM
ECHO AV AREA PEAK VELOCITY: 1.99 CM2
ECHO AV AREA VTI: 2.2 CM2
ECHO AV AREA/BSA PEAK VELOCITY: 1 CM2/M2
ECHO AV AREA/BSA VTI: 1.2 CM2/M2
ECHO AV MEAN GRADIENT: 6.16 MMHG
ECHO AV PEAK GRADIENT: 11.91 MMHG
ECHO AV PEAK VELOCITY: 173 CM/S
ECHO AV VTI: 29.48 CM
ECHO IVC PROX: 2.02 CM
ECHO LA VOL 2C: 78.65 ML (ref 18–58)
ECHO LA VOL 4C: 55.2 ML (ref 18–58)
ECHO LA VOL BP: 74.09 ML (ref 18–58)
ECHO LV EDV A2C: 125.51 ML
ECHO LV EDV A4C: 167.07 ML
ECHO LV EDV BP: 143.17 ML (ref 67–155)
ECHO LV EJECTION FRACTION A2C: 64 %
ECHO LV EJECTION FRACTION A4C: 60 %
ECHO LV EJECTION FRACTION BIPLANE: 60.7 % (ref 55–100)
ECHO LV ESV A2C: 45.55 ML
ECHO LV ESV A4C: 66.56 ML
ECHO LV ESV BP: 56.26 ML (ref 22–58)
ECHO LV INTERNAL DIMENSION DIASTOLIC: 4.37 CM (ref 4.2–5.9)
ECHO LV INTERNAL DIMENSION SYSTOLIC: 2.97 CM
ECHO LV IVSD: 1.71 CM (ref 0.6–1)
ECHO LV MASS 2D: 283.6 G (ref 88–224)
ECHO LV MASS INDEX 2D: 148.5 G/M2 (ref 49–115)
ECHO LV POSTERIOR WALL DIASTOLIC: 1.43 CM (ref 0.6–1)
ECHO LVOT CARDIAC OUTPUT: 6.71 L/MIN
ECHO LVOT DIAM: 2.01 CM
ECHO LVOT PEAK GRADIENT: 4.66 MMHG
ECHO LVOT PEAK VELOCITY: 108 CM/S
ECHO LVOT SV: 64.8 ML
ECHO LVOT SV: 80 ML
ECHO LVOT VTI: 20.42 CM
ECHO RV INTERNAL DIMENSION: 3.17 CM
ECHO TV REGURGITANT MAX VELOCITY: 275 CM/S
ECHO TV REGURGITANT PEAK GRADIENT: 30.22 MMHG
EOSINOPHIL # BLD: 0 K/UL (ref 0–0.4)
EOSINOPHIL NFR BLD: 1 % (ref 0–5)
ERYTHROCYTE [DISTWIDTH] IN BLOOD BY AUTOMATED COUNT: 19.8 % (ref 11.6–14.5)
EST. AVERAGE GLUCOSE BLD GHB EST-MCNC: 88 MG/DL
FLUAV H1 2009 PAND RNA SPEC QL NAA+PROBE: NOT DETECTED
FLUAV H1 RNA SPEC QL NAA+PROBE: NOT DETECTED
FLUAV H3 RNA SPEC QL NAA+PROBE: NOT DETECTED
FLUAV SUBTYP SPEC NAA+PROBE: NOT DETECTED
FLUBV RNA SPEC QL NAA+PROBE: NOT DETECTED
GLOBULIN SER CALC-MCNC: 4.8 G/DL (ref 2–4)
GLUCOSE BLD STRIP.AUTO-MCNC: 112 MG/DL (ref 70–110)
GLUCOSE BLD STRIP.AUTO-MCNC: 116 MG/DL (ref 70–110)
GLUCOSE BLD STRIP.AUTO-MCNC: 124 MG/DL (ref 70–110)
GLUCOSE BLD STRIP.AUTO-MCNC: 129 MG/DL (ref 70–110)
GLUCOSE BLD STRIP.AUTO-MCNC: 140 MG/DL (ref 70–110)
GLUCOSE BLD STRIP.AUTO-MCNC: 91 MG/DL (ref 70–110)
GLUCOSE BLD STRIP.AUTO-MCNC: 96 MG/DL (ref 70–110)
GLUCOSE SERPL-MCNC: 140 MG/DL (ref 74–99)
GLUCOSE SERPL-MCNC: 81 MG/DL (ref 74–99)
GLUCOSE, GLC: 112 MG/DL
GLUCOSE, GLC: 124 MG/DL
GLUCOSE, GLC: 129 MG/DL
GLUCOSE, GLC: 140 MG/DL
GLUCOSE, GLC: 91 MG/DL
HADV DNA SPEC QL NAA+PROBE: NOT DETECTED
HBA1C MFR BLD: 4.7 % (ref 4.2–5.6)
HBV SURFACE AB SER QL IA: NEGATIVE
HBV SURFACE AB SERPL IA-ACNC: 4.34 MIU/ML
HBV SURFACE AG SER QL: <0.1 INDEX
HBV SURFACE AG SER QL: NEGATIVE
HCO3 BLDV-SCNC: 8.2 MMOL/L (ref 23–28)
HCOV 229E RNA SPEC QL NAA+PROBE: NOT DETECTED
HCOV HKU1 RNA SPEC QL NAA+PROBE: NOT DETECTED
HCOV NL63 RNA SPEC QL NAA+PROBE: NOT DETECTED
HCOV OC43 RNA SPEC QL NAA+PROBE: NOT DETECTED
HCT VFR BLD AUTO: 29.2 % (ref 36–48)
HEP BS AB COMMENT,HBSAC: ABNORMAL
HGB BLD-MCNC: 9.8 G/DL (ref 13–16)
HIGH TARGET, HITG: 180 MG/DL
HMPV RNA SPEC QL NAA+PROBE: NOT DETECTED
HPIV1 RNA SPEC QL NAA+PROBE: NOT DETECTED
HPIV2 RNA SPEC QL NAA+PROBE: NOT DETECTED
HPIV3 RNA SPEC QL NAA+PROBE: NOT DETECTED
HPIV4 RNA SPEC QL NAA+PROBE: NOT DETECTED
INSULIN ADMINSTERED, INSADM: 0 UNITS/HOUR
INSULIN ADMINSTERED, INSADM: 0 UNITS/HOUR
INSULIN ADMINSTERED, INSADM: 0.6 UNITS/HOUR
INSULIN ADMINSTERED, INSADM: 0.8 UNITS/HOUR
INSULIN ADMINSTERED, INSADM: 1 UNITS/HOUR
INSULIN ORDER, INSORD: 0 UNITS/HOUR
INSULIN ORDER, INSORD: 0 UNITS/HOUR
INSULIN ORDER, INSORD: 0.6 UNITS/HOUR
INSULIN ORDER, INSORD: 0.8 UNITS/HOUR
INSULIN ORDER, INSORD: 1 UNITS/HOUR
LACTATE BLD-SCNC: 0.76 MMOL/L (ref 0.4–2)
LACTATE SERPL-SCNC: 1.1 MMOL/L (ref 0.4–2)
LOW TARGET, LOT: 140 MG/DL
LVOT MG: 2.44 MMHG
LYMPHOCYTES # BLD: 0.3 K/UL (ref 0.9–3.6)
LYMPHOCYTES NFR BLD: 6 % (ref 21–52)
M PNEUMO DNA SPEC QL NAA+PROBE: NOT DETECTED
MAGNESIUM SERPL-MCNC: 3 MG/DL (ref 1.6–2.6)
MCH RBC QN AUTO: 25.6 PG (ref 24–34)
MCHC RBC AUTO-ENTMCNC: 33.6 G/DL (ref 31–37)
MCV RBC AUTO: 76.2 FL (ref 78–100)
MINUTES UNTIL NEXT BG, NBG: 60 MIN
MONOCYTES # BLD: 0.6 K/UL (ref 0.05–1.2)
MONOCYTES NFR BLD: 14 % (ref 3–10)
MULTIPLIER, MUL: 0
MULTIPLIER, MUL: 0
MULTIPLIER, MUL: 0.01
MULTIPLIER, MUL: 0.01
MULTIPLIER, MUL: 0.02
NEUTS SEG # BLD: 3.6 K/UL (ref 1.8–8)
NEUTS SEG NFR BLD: 79 % (ref 40–73)
ORDER INITIALS, ORDINIT: NORMAL
P-R INTERVAL, ECG05: 172 MS
PCO2 BLDV: 20.1 MMHG (ref 41–51)
PH BLDV: 7.22 [PH] (ref 7.32–7.42)
PHOSPHATE SERPL-MCNC: 12.2 MG/DL (ref 2.5–4.9)
PLATELET # BLD AUTO: 97 K/UL (ref 135–420)
PLATELET COMMENTS,PCOM: ABNORMAL
PO2 BLDV: 76 MMHG (ref 25–40)
POTASSIUM SERPL-SCNC: 4.1 MMOL/L (ref 3.5–5.5)
POTASSIUM SERPL-SCNC: 7 MMOL/L (ref 3.5–5.5)
PROT SERPL-MCNC: 7.6 G/DL (ref 6.4–8.2)
Q-T INTERVAL, ECG07: 392 MS
QRS DURATION, ECG06: 92 MS
QTC CALCULATION (BEZET), ECG08: 500 MS
RBC # BLD AUTO: 3.83 M/UL (ref 4.35–5.65)
RBC MORPH BLD: ABNORMAL
RSV RNA SPEC QL NAA+PROBE: NOT DETECTED
RV+EV RNA SPEC QL NAA+PROBE: NOT DETECTED
SAO2 % BLDV: 92.6 % (ref 65–88)
SARS-COV-2 PCR, COVPCR: NOT DETECTED
SERVICE CMNT-IMP: ABNORMAL
SERVICE CMNT-IMP: ABNORMAL
SODIUM SERPL-SCNC: 136 MMOL/L (ref 136–145)
SODIUM SERPL-SCNC: 138 MMOL/L (ref 136–145)
SPECIMEN TYPE: ABNORMAL
TROPONIN I SERPL-MCNC: 0.02 NG/ML (ref 0–0.04)
TROPONIN I SERPL-MCNC: 0.03 NG/ML (ref 0–0.04)
TROPONIN I SERPL-MCNC: 0.06 NG/ML (ref 0–0.04)
VENTRICULAR RATE, ECG03: 98 BPM
WBC # BLD AUTO: 4.5 K/UL (ref 4.6–13.2)

## 2021-08-27 PROCEDURE — C9113 INJ PANTOPRAZOLE SODIUM, VIA: HCPCS | Performed by: INTERNAL MEDICINE

## 2021-08-27 PROCEDURE — 87340 HEPATITIS B SURFACE AG IA: CPT

## 2021-08-27 PROCEDURE — 74011250636 HC RX REV CODE- 250/636: Performed by: EMERGENCY MEDICINE

## 2021-08-27 PROCEDURE — 87040 BLOOD CULTURE FOR BACTERIA: CPT

## 2021-08-27 PROCEDURE — 93306 TTE W/DOPPLER COMPLETE: CPT

## 2021-08-27 PROCEDURE — 82553 CREATINE MB FRACTION: CPT

## 2021-08-27 PROCEDURE — 96361 HYDRATE IV INFUSION ADD-ON: CPT

## 2021-08-27 PROCEDURE — 83605 ASSAY OF LACTIC ACID: CPT

## 2021-08-27 PROCEDURE — 77030040393 HC DRSG OPTIFOAM GENT MDII -B

## 2021-08-27 PROCEDURE — 74011000250 HC RX REV CODE- 250: Performed by: EMERGENCY MEDICINE

## 2021-08-27 PROCEDURE — 86706 HEP B SURFACE ANTIBODY: CPT

## 2021-08-27 PROCEDURE — 74011250637 HC RX REV CODE- 250/637: Performed by: INTERNAL MEDICINE

## 2021-08-27 PROCEDURE — 93926 LOWER EXTREMITY STUDY: CPT

## 2021-08-27 PROCEDURE — 84100 ASSAY OF PHOSPHORUS: CPT

## 2021-08-27 PROCEDURE — 74011000250 HC RX REV CODE- 250

## 2021-08-27 PROCEDURE — 87493 C DIFF AMPLIFIED PROBE: CPT

## 2021-08-27 PROCEDURE — 71045 X-RAY EXAM CHEST 1 VIEW: CPT

## 2021-08-27 PROCEDURE — 83735 ASSAY OF MAGNESIUM: CPT

## 2021-08-27 PROCEDURE — 5A1D70Z PERFORMANCE OF URINARY FILTRATION, INTERMITTENT, LESS THAN 6 HOURS PER DAY: ICD-10-PCS | Performed by: INTERNAL MEDICINE

## 2021-08-27 PROCEDURE — 87324 CLOSTRIDIUM AG IA: CPT

## 2021-08-27 PROCEDURE — 74011000250 HC RX REV CODE- 250: Performed by: INTERNAL MEDICINE

## 2021-08-27 PROCEDURE — 85025 COMPLETE CBC W/AUTO DIFF WBC: CPT

## 2021-08-27 PROCEDURE — 74011250636 HC RX REV CODE- 250/636: Performed by: STUDENT IN AN ORGANIZED HEALTH CARE EDUCATION/TRAINING PROGRAM

## 2021-08-27 PROCEDURE — 36415 COLL VENOUS BLD VENIPUNCTURE: CPT

## 2021-08-27 PROCEDURE — 82803 BLOOD GASES ANY COMBINATION: CPT

## 2021-08-27 PROCEDURE — 82962 GLUCOSE BLOOD TEST: CPT

## 2021-08-27 PROCEDURE — 74011250636 HC RX REV CODE- 250/636: Performed by: INTERNAL MEDICINE

## 2021-08-27 PROCEDURE — 80053 COMPREHEN METABOLIC PANEL: CPT

## 2021-08-27 PROCEDURE — 74011250637 HC RX REV CODE- 250/637: Performed by: STUDENT IN AN ORGANIZED HEALTH CARE EDUCATION/TRAINING PROGRAM

## 2021-08-27 PROCEDURE — 80361 OPIATES 1 OR MORE: CPT

## 2021-08-27 PROCEDURE — 74011000258 HC RX REV CODE- 258: Performed by: EMERGENCY MEDICINE

## 2021-08-27 PROCEDURE — 65610000006 HC RM INTENSIVE CARE

## 2021-08-27 PROCEDURE — 90935 HEMODIALYSIS ONE EVALUATION: CPT

## 2021-08-27 PROCEDURE — 73721 MRI JNT OF LWR EXTRE W/O DYE: CPT

## 2021-08-27 PROCEDURE — 87506 IADNA-DNA/RNA PROBE TQ 6-11: CPT

## 2021-08-27 PROCEDURE — 77030002986 HC SUT PROL J&J -A

## 2021-08-27 PROCEDURE — 80307 DRUG TEST PRSMV CHEM ANLYZR: CPT

## 2021-08-27 PROCEDURE — 73630 X-RAY EXAM OF FOOT: CPT

## 2021-08-27 PROCEDURE — 02HV33Z INSERTION OF INFUSION DEVICE INTO SUPERIOR VENA CAVA, PERCUTANEOUS APPROACH: ICD-10-PCS | Performed by: EMERGENCY MEDICINE

## 2021-08-27 PROCEDURE — 96365 THER/PROPH/DIAG IV INF INIT: CPT

## 2021-08-27 PROCEDURE — 74011636637 HC RX REV CODE- 636/637: Performed by: EMERGENCY MEDICINE

## 2021-08-27 RX ORDER — HYDRALAZINE HYDROCHLORIDE 20 MG/ML
10 INJECTION INTRAMUSCULAR; INTRAVENOUS
Status: DISCONTINUED | OUTPATIENT
Start: 2021-08-27 | End: 2021-08-27

## 2021-08-27 RX ORDER — DEXTROSE 50 % IN WATER (D50W) INTRAVENOUS SYRINGE
25-50 AS NEEDED
Status: DISCONTINUED | OUTPATIENT
Start: 2021-08-27 | End: 2021-08-27

## 2021-08-27 RX ORDER — SODIUM CHLORIDE 0.9 % (FLUSH) 0.9 %
5-40 SYRINGE (ML) INJECTION EVERY 8 HOURS
Status: DISCONTINUED | OUTPATIENT
Start: 2021-08-27 | End: 2021-08-29

## 2021-08-27 RX ORDER — INSULIN LISPRO 100 [IU]/ML
INJECTION, SOLUTION INTRAVENOUS; SUBCUTANEOUS EVERY 4 HOURS
Status: DISCONTINUED | OUTPATIENT
Start: 2021-08-27 | End: 2021-09-02

## 2021-08-27 RX ORDER — ONDANSETRON 2 MG/ML
4 INJECTION INTRAMUSCULAR; INTRAVENOUS
Status: DISCONTINUED | OUTPATIENT
Start: 2021-08-27 | End: 2021-09-06 | Stop reason: HOSPADM

## 2021-08-27 RX ORDER — LIDOCAINE HYDROCHLORIDE AND EPINEPHRINE 10; 10 MG/ML; UG/ML
INJECTION, SOLUTION INFILTRATION; PERINEURAL
Status: COMPLETED
Start: 2021-08-27 | End: 2021-08-27

## 2021-08-27 RX ORDER — HYDRALAZINE HYDROCHLORIDE 20 MG/ML
10 INJECTION INTRAMUSCULAR; INTRAVENOUS
Status: DISCONTINUED | OUTPATIENT
Start: 2021-08-27 | End: 2021-08-29

## 2021-08-27 RX ORDER — SODIUM CHLORIDE 0.9 % (FLUSH) 0.9 %
5-40 SYRINGE (ML) INJECTION AS NEEDED
Status: DISCONTINUED | OUTPATIENT
Start: 2021-08-27 | End: 2021-08-29

## 2021-08-27 RX ORDER — PROMETHAZINE HYDROCHLORIDE 25 MG/1
12.5 TABLET ORAL
Status: DISCONTINUED | OUTPATIENT
Start: 2021-08-27 | End: 2021-09-06 | Stop reason: HOSPADM

## 2021-08-27 RX ORDER — AMLODIPINE BESYLATE 5 MG/1
10 TABLET ORAL DAILY
Status: DISCONTINUED | OUTPATIENT
Start: 2021-08-27 | End: 2021-09-06 | Stop reason: HOSPADM

## 2021-08-27 RX ORDER — PANTOPRAZOLE SODIUM 40 MG/10ML
40 INJECTION, POWDER, LYOPHILIZED, FOR SOLUTION INTRAVENOUS EVERY 24 HOURS
Status: DISCONTINUED | OUTPATIENT
Start: 2021-08-27 | End: 2021-09-04

## 2021-08-27 RX ORDER — MAGNESIUM SULFATE 100 %
4 CRYSTALS MISCELLANEOUS AS NEEDED
Status: DISCONTINUED | OUTPATIENT
Start: 2021-08-27 | End: 2021-08-27

## 2021-08-27 RX ORDER — LABETALOL HYDROCHLORIDE 5 MG/ML
20 INJECTION, SOLUTION INTRAVENOUS
Status: DISCONTINUED | OUTPATIENT
Start: 2021-08-27 | End: 2021-08-27

## 2021-08-27 RX ORDER — LEVOFLOXACIN 5 MG/ML
750 INJECTION, SOLUTION INTRAVENOUS
Status: DISCONTINUED | OUTPATIENT
Start: 2021-08-29 | End: 2021-08-27

## 2021-08-27 RX ORDER — HEPARIN SODIUM 1000 [USP'U]/ML
2000 INJECTION, SOLUTION INTRAVENOUS; SUBCUTANEOUS
Status: DISCONTINUED | OUTPATIENT
Start: 2021-08-27 | End: 2021-09-06 | Stop reason: HOSPADM

## 2021-08-27 RX ORDER — ACETAMINOPHEN 325 MG/1
650 TABLET ORAL
Status: DISCONTINUED | OUTPATIENT
Start: 2021-08-27 | End: 2021-09-06 | Stop reason: HOSPADM

## 2021-08-27 RX ORDER — HYDRALAZINE HYDROCHLORIDE 25 MG/1
25 TABLET, FILM COATED ORAL 3 TIMES DAILY
Status: DISCONTINUED | OUTPATIENT
Start: 2021-08-27 | End: 2021-08-27

## 2021-08-27 RX ORDER — SODIUM POLYSTYRENE SULFONATE 15 G/60ML
15 SUSPENSION ORAL; RECTAL
Status: DISPENSED | OUTPATIENT
Start: 2021-08-27 | End: 2021-08-27

## 2021-08-27 RX ORDER — LEVOFLOXACIN 5 MG/ML
750 INJECTION, SOLUTION INTRAVENOUS ONCE
Status: COMPLETED | OUTPATIENT
Start: 2021-08-27 | End: 2021-08-27

## 2021-08-27 RX ORDER — LEVOFLOXACIN 5 MG/ML
750 INJECTION, SOLUTION INTRAVENOUS ONCE
Status: DISCONTINUED | OUTPATIENT
Start: 2021-08-27 | End: 2021-08-27

## 2021-08-27 RX ORDER — ACETAMINOPHEN 650 MG/1
650 SUPPOSITORY RECTAL
Status: DISCONTINUED | OUTPATIENT
Start: 2021-08-27 | End: 2021-09-06 | Stop reason: HOSPADM

## 2021-08-27 RX ORDER — CALCIUM GLUCONATE 20 MG/ML
1 INJECTION, SOLUTION INTRAVENOUS ONCE
Status: COMPLETED | OUTPATIENT
Start: 2021-08-27 | End: 2021-08-27

## 2021-08-27 RX ORDER — HYDRALAZINE HYDROCHLORIDE 50 MG/1
50 TABLET, FILM COATED ORAL 3 TIMES DAILY
Status: DISCONTINUED | OUTPATIENT
Start: 2021-08-27 | End: 2021-09-01

## 2021-08-27 RX ORDER — DIPHENHYDRAMINE HYDROCHLORIDE 50 MG/ML
25 INJECTION, SOLUTION INTRAMUSCULAR; INTRAVENOUS ONCE
Status: COMPLETED | OUTPATIENT
Start: 2021-08-27 | End: 2021-08-27

## 2021-08-27 RX ORDER — HEPARIN SODIUM 5000 [USP'U]/ML
5000 INJECTION, SOLUTION INTRAVENOUS; SUBCUTANEOUS EVERY 8 HOURS
Status: DISCONTINUED | OUTPATIENT
Start: 2021-08-27 | End: 2021-09-06 | Stop reason: HOSPADM

## 2021-08-27 RX ORDER — LIDOCAINE HYDROCHLORIDE 10 MG/ML
INJECTION, SOLUTION EPIDURAL; INFILTRATION; INTRACAUDAL; PERINEURAL
Status: COMPLETED
Start: 2021-08-27 | End: 2021-08-27

## 2021-08-27 RX ORDER — SEVELAMER CARBONATE 800 MG/1
1600 TABLET, FILM COATED ORAL
Status: DISCONTINUED | OUTPATIENT
Start: 2021-08-27 | End: 2021-09-06 | Stop reason: HOSPADM

## 2021-08-27 RX ORDER — SODIUM CHLORIDE 0.9 % (FLUSH) 0.9 %
5-40 SYRINGE (ML) INJECTION AS NEEDED
Status: DISCONTINUED | OUTPATIENT
Start: 2021-08-27 | End: 2021-08-27 | Stop reason: SDUPTHER

## 2021-08-27 RX ORDER — LEVOFLOXACIN 750 MG/1
750 TABLET ORAL
Status: DISCONTINUED | OUTPATIENT
Start: 2021-08-27 | End: 2021-08-27

## 2021-08-27 RX ORDER — SODIUM CHLORIDE 0.9 % (FLUSH) 0.9 %
5-40 SYRINGE (ML) INJECTION EVERY 8 HOURS
Status: DISCONTINUED | OUTPATIENT
Start: 2021-08-27 | End: 2021-08-27 | Stop reason: SDUPTHER

## 2021-08-27 RX ORDER — CARVEDILOL 12.5 MG/1
25 TABLET ORAL 2 TIMES DAILY WITH MEALS
Status: DISCONTINUED | OUTPATIENT
Start: 2021-08-27 | End: 2021-09-06 | Stop reason: HOSPADM

## 2021-08-27 RX ORDER — ISOSORBIDE MONONITRATE 60 MG/1
60 TABLET, EXTENDED RELEASE ORAL DAILY
Status: DISCONTINUED | OUTPATIENT
Start: 2021-08-27 | End: 2021-09-06 | Stop reason: HOSPADM

## 2021-08-27 RX ORDER — DEXTROSE MONOHYDRATE 100 MG/ML
75 INJECTION, SOLUTION INTRAVENOUS CONTINUOUS
Status: DISCONTINUED | OUTPATIENT
Start: 2021-08-27 | End: 2021-08-27

## 2021-08-27 RX ORDER — POLYETHYLENE GLYCOL 3350 17 G/17G
17 POWDER, FOR SOLUTION ORAL DAILY PRN
Status: DISCONTINUED | OUTPATIENT
Start: 2021-08-27 | End: 2021-09-06 | Stop reason: HOSPADM

## 2021-08-27 RX ORDER — LEVOFLOXACIN 5 MG/ML
500 INJECTION, SOLUTION INTRAVENOUS
Status: DISCONTINUED | OUTPATIENT
Start: 2021-08-29 | End: 2021-09-02

## 2021-08-27 RX ADMIN — AMLODIPINE BESYLATE 10 MG: 5 TABLET ORAL at 11:49

## 2021-08-27 RX ADMIN — LABETALOL HYDROCHLORIDE 20 MG: 5 INJECTION INTRAVENOUS at 09:19

## 2021-08-27 RX ADMIN — SODIUM CHLORIDE 1 UNITS/HR: 9 INJECTION, SOLUTION INTRAVENOUS at 01:56

## 2021-08-27 RX ADMIN — ACETAMINOPHEN 650 MG: 325 TABLET ORAL at 10:00

## 2021-08-27 RX ADMIN — WATER 1 G: 1 INJECTION INTRAMUSCULAR; INTRAVENOUS; SUBCUTANEOUS at 18:12

## 2021-08-27 RX ADMIN — LEVOFLOXACIN 750 MG: 5 INJECTION, SOLUTION INTRAVENOUS at 03:30

## 2021-08-27 RX ADMIN — Medication 10 ML: at 22:00

## 2021-08-27 RX ADMIN — DIPHENHYDRAMINE HYDROCHLORIDE 25 MG: 50 INJECTION INTRAMUSCULAR; INTRAVENOUS at 03:01

## 2021-08-27 RX ADMIN — EPOETIN ALFA-EPBX 4000 UNITS: 4000 INJECTION, SOLUTION INTRAVENOUS; SUBCUTANEOUS at 17:30

## 2021-08-27 RX ADMIN — HYDRALAZINE HYDROCHLORIDE 50 MG: 50 TABLET, FILM COATED ORAL at 17:31

## 2021-08-27 RX ADMIN — WATER 1 G: 1 INJECTION INTRAMUSCULAR; INTRAVENOUS; SUBCUTANEOUS at 11:49

## 2021-08-27 RX ADMIN — SEVELAMER CARBONATE 1600 MG: 800 TABLET, FILM COATED ORAL at 11:49

## 2021-08-27 RX ADMIN — LIDOCAINE HYDROCHLORIDE AND EPINEPHRINE: 10; 10 INJECTION, SOLUTION INFILTRATION; PERINEURAL at 03:00

## 2021-08-27 RX ADMIN — THROMBIN, TOPICAL (BOVINE) 20000 UNITS: KIT at 03:00

## 2021-08-27 RX ADMIN — Medication 10 ML: at 09:24

## 2021-08-27 RX ADMIN — CEFEPIME HYDROCHLORIDE 2 G: 2 INJECTION, POWDER, FOR SOLUTION INTRAVENOUS at 03:01

## 2021-08-27 RX ADMIN — Medication: at 00:17

## 2021-08-27 RX ADMIN — DEXTROSE MONOHYDRATE 75 ML/HR: 10 INJECTION, SOLUTION INTRAVENOUS at 01:55

## 2021-08-27 RX ADMIN — CALCIUM GLUCONATE 1000 MG: 20 INJECTION, SOLUTION INTRAVENOUS at 06:45

## 2021-08-27 RX ADMIN — HYDRALAZINE HYDROCHLORIDE 10 MG: 20 INJECTION INTRAMUSCULAR; INTRAVENOUS at 06:46

## 2021-08-27 RX ADMIN — ISOSORBIDE MONONITRATE 60 MG: 60 TABLET, EXTENDED RELEASE ORAL at 11:49

## 2021-08-27 RX ADMIN — Medication 10 ML: at 14:17

## 2021-08-27 RX ADMIN — CARVEDILOL 25 MG: 12.5 TABLET, FILM COATED ORAL at 17:31

## 2021-08-27 RX ADMIN — HYDRALAZINE HYDROCHLORIDE 50 MG: 50 TABLET, FILM COATED ORAL at 21:37

## 2021-08-27 RX ADMIN — CARVEDILOL 25 MG: 12.5 TABLET, FILM COATED ORAL at 11:49

## 2021-08-27 RX ADMIN — SODIUM CHLORIDE 1000 ML: 900 INJECTION, SOLUTION INTRAVENOUS at 01:22

## 2021-08-27 RX ADMIN — PANTOPRAZOLE SODIUM 40 MG: 40 INJECTION, POWDER, FOR SOLUTION INTRAVENOUS at 03:29

## 2021-08-27 NOTE — PROGRESS NOTES
Brief note:  Full consult note to follow in the morning. Discussed with Dr Claudette Del Rosario    54 yrs male on HD MWF, missed dialysis since last Friday     He was brought due to AMS, blood sugar was 43 , received dextrose. He was found to have SBP > 200 mmhg  hyperkalemia  7.1 and significant metabolic acidosis     His anion GAP acidosis is most likely due to uremia/ high BUN itself in setting of missed dialysis , if there is no increased ketones and Beta Hydroxybutyrate , may not need insulin drip and IVF . Will dialyze him urgently now for 2.5 hours . Labetalol 20 mg iv push and Ativan   Due to ESRD and volume overload please minimize volume administration.      Informed dialysis nurse

## 2021-08-27 NOTE — ED NOTES
Dr. Birdia Osler at bedside to perform central line placement to right subclavian. Bleeding noted. Dressing changed using sterile technique.

## 2021-08-27 NOTE — ED NOTES
Pt displeased with waiting for lab results. Educated pt on wait times for accurate results. Pt was not receptive to education and demanded to leave immediately because she has to wait. Provider aware.

## 2021-08-27 NOTE — CONSULTS
RENAL CONSULT  2021    Patient:  Halie Monroe. :  1970  Gender:  male  MRN #:  [id number]    Reason for Consult: Management of Hyperkalemia and Severe metabolic acidosis in setting of ESRD requested by ED physician Dr Marlene Reyes     Assessment:  Halie France is a 54y.o. year old male  With h/o ESRD on HD (MWF) , Uncontrolled HTN, DM, CHF with EF 40 % , right BKA status , chronic diarrhea and abdominal discomfort was brought by EMS due to unresponsiveness. He was found to have hypoglycemia requiring dextrose drip initially . He is non compliant with dialysis session and medications . Mostly does not take anti hypertensive meds and comes with uncontrolled HTN in dialysis unit . He had missed dialysis more than 1 week due to not feeling well and diarrhea . He was found to have Hypertensive urgency ,  hyperkalemia, significant metabolic acidosis and pulmonary vascular congestion in CXR  Admitted in ICU for further care  On insulin , dextrose drip for Hyperkalemia     # Hyperkalemia   # Metabolic Acidosis   # Pulmonary edema  # Uncontrolled HTN/Urgency          Plan:      # ESRD-  Due to hyperkalemia and significant metabolic acidosis dialysis emergently for 2.5 hours with 2 k bath this morning with 2 kg UF   - Repeat BMP after dialysis showed potassium of 4.1 mmol/lt   - As he has missed more than week of dialysis will plan to dialyze him again on Saturday   - At present he is breathing fine on room air  - Intake and output recording   - Dose all meds for ESRD    # Metabolic Acidosis - Due to missed dialysis , should improve after HD     # Chronic Diarrhea :- Has h/o C.diff infection in the past .  He has genuine problems of chronic diarrhea for many months. Have seen him going to bathroom every 10 minutes in dialysis unit He mostly complaints of abdominal discomfort which is the reason why he said he miss dialysis . On : stool showed- Indeterminate for Toxigenic C. Difficile  His symptoms could be due to chronic C.diff infection , may benefit from chronic suppressive therapy or any other intervention   - consider ID consult this admission for this   - Also he mostly complaints of abdominal discomfort and has significant anemia mostly . Not sure if he has chronic GI bleed from colitis   - consider CT abdomen and pelvis once he is more stable prior to discharge and GI consult if he needs colonoscopy due to clinical symptoms of colitis     # Hypertension: - He presented with BP of 229/103 , which improved after Labetalol 20 mg iv push   - At present his BP is 158/118 mmhg ,  - will resume his outpatient meds - coreg 25 mg BID , amlodipine 10 mg , Hydralazine 50 mg TID and Imdur 60 mg daily   - with those meds if BP remained high should resume Losartan and minoxidil also which he takes at home  - Hydralazine iv prn as ordered     # Anemia: Hemoglobin is below target range   Stool guiac   EVIE during dialysis   Iron profile     # BMD- Corrected calcium is close to normal   Phos  is high- start sevelamer 1600 mg TID   Will check PTH     Rest of the medical management per ICU and primary team         History of Present Illness:    Jim Duckworth. is a 54y.o. year old male  With h/o ESRD on HD (MWF) , Uncontrolled HTN, DM, CHF with EF 40 % , right BKA status , chronic diarrhea and abdominal discomfort was brought by EMS due to unresponsiveness . Reportedly his FSG was 43 , was given oral glucose tablets and he became responsive. Upon arrival arousable patient became combative and required Ativan and had difficult IV access. Central line was placed with difficulty due to combativeness post x-ray central line shows no pneumothorax and correct placement however there is substantial amount of bleeding at the site. His last HD was on 8/18 . Reports he missed whole week of dialysis due to diarrhea and abdominal discomfort which has been going on for many months .       In ED : He was found to have hyperkalemia of 7.1 mmol/lt, with some EKG changes, severe metabolic acidosis  And very high BUN/creatinine with evidence of fluid overload in CXR     He was given gentle fluid , calcium gluconate , kayexalate , dextrose 10 % and insulin drip . Past Medical History:   Diagnosis Date    Chronic kidney disease     Diabetes ([facility]     Heart failure ([facility]     Hypertension     [facility]      Past Surgical History:   Procedure Laterality Date    HX ORTHOPAEDIC      right BKA 2017     No family history on file.   Allergies   Allergen Reactions    Penicillins Rash    Vancomycin Other (comments)     kayla syndrome     Current Facility-Administered Medications   Medication Dose Route Frequency Provider Last Rate Last Admin    sodium chloride (NS) flush 5-40 mL  5-40 mL IntraVENous Q8H Little Watson MD        sodium chloride (NS) flush 5-40 mL  5-40 mL IntraVENous PRN Little Watson MD        acetaminophen (TYLENOL) tablet 650 mg  650 mg Oral Q6H PRN Little Watson MD   650 mg at 08/27/21 1000    Or    acetaminophen (TYLENOL) suppository 650 mg  650 mg Rectal Q6H PRN Little Watosn MD        polyethylene glycol (MIRALAX) packet 17 g  17 g Oral DAILY PRN Little Watson MD        [Held by provider] heparin (porcine) injection 5,000 Units  5,000 Units SubCUTAneous Q8H Little Watson MD        promethazine (PHENERGAN) tablet 12.5 mg  12.5 mg Oral Q6H PRN Little Watson MD        Or    ondansetron (ZOFRAN) injection 4 mg  4 mg IntraVENous Q6H PRN Little Watson MD        pantoprazole (PROTONIX) injection 40 mg  40 mg IntraVENous Q24H Little Watson MD   40 mg at 08/27/21 0329    cefepime (MAXIPIME) 1 g in sterile water (preservative free) 10 mL IV syringe  1 g IntraVENous Q8H Little Watson MD        [START ON 8/29/2021] levoFLOXacin (LEVAQUIN) 500 mg in D5W IVPB  500 mg IntraVENous Q48H Jacinto Watson MD        hydrALAZINE (APRESOLINE) 20 mg/mL injection 10 mg  10 mg IntraVENous Q6H PRN Jacinto Watson MD   10 mg at 08/27/21 0646    sodium chloride (NS) flush 5-40 mL  5-40 mL IntraVENous Q8H Jacinto Watson MD   10 mL at 08/27/21 0924    sodium chloride (NS) flush 5-40 mL  5-40 mL IntraVENous PRN Jacinto Watson MD        sodium polystyrene (KAYEXALATE) 15 gram/60 mL oral suspension 15 g  15 g Oral NOW Jacinto Watson MD        labetaloL (NORMODYNE;TRANDATE) injection 20 mg  20 mg IntraVENous Q4H PRN aJcinto Watson MD   20 mg at 08/27/21 0919    insulin lispro (HUMALOG) injection   SubCUTAneous Q4H Truman Kendall MD        amLODIPine (NORVASC) tablet 10 mg  10 mg Oral DAILY Truman Kendall MD        carvediloL (COREG) tablet 25 mg  25 mg Oral BID WITH MEALS Truman Kendall MD        isosorbide mononitrate ER (IMDUR) tablet 60 mg  60 mg Oral DAILY Truman Kendall MD        hydrALAZINE (APRESOLINE) tablet 50 mg  50 mg Oral TID Michael Montesinos MD        sevelamer carbonate (RENVELA) tab 1,600 mg  1,600 mg Oral TID WITH MEALS José Alston MD        sodium chloride (NS) flush 5-10 mL  5-10 mL IntraVENous PRN Zaire Rodarte MD           Review of Symptoms:     Consitutional Symptoms: No fever, weight loss, weight gain and fatigue  Eyes:- No change in vision , no itching   Ears, Nose, Mouth, Throat:  No neck pain , swelling ,hearing loss and nose bleed. Pulmonary: No cough and shortness of breath . CVS: No  Chest pain , palpitation and orthopnea  GI: abdominal pain and diarrhea   - still makes decent urine   Neurological:No dizzy ness, syncope, focal weakness and numbness .   Endocrine: No feeling of excessive cold or warmth, hot flushes  Psychiatric: Denied feeling depressed    Objective:    Visit Vitals  BP (!) 182/77   Pulse (!) 103   Temp 98.6 °F (37 °C)   Resp 19   Ht 5' 7\" (1.702 m)   Wt 78.9 kg (174 lb)   SpO2 96%   BMI 27.25 kg/m² Physical Exam:    Pt awake,  alert and comfortable  HEENT: No JVD, anicteric sclera, no neck swelling  Lung: clear to auscultation  Heart: s1s2 regular  no rubs or murmur  Abdomen: soft, non tender, no guarding, normal bowel sounds. Ext: RT- BKA status , left - no edema, heel ulcer/infection  CNS- Oriented to time , place and person     Laboratory Data:    Lab Results   Component Value Date    BUN 79 (H) 08/27/2021     (H) 08/27/2021     (H) 08/26/2021     08/27/2021     08/27/2021     08/26/2021    CO2 19 (L) 08/27/2021    CO2 9 (LL) 08/27/2021    CO2 8 (LL) 08/26/2021     Lab Results   Component Value Date    WBC 4.5 (L) 08/27/2021    HGB 9.8 (L) 08/27/2021    HCT 29.2 (L) 08/27/2021     No components found for: CALCIUM, PHOSPHORUS, MAGNESIUM  No results found for: HDL  No results found for: SPECIMENTYP, TURBIDITY, UGLU    Imaging Reveiwed:    CXR 8/27/21- 1. Adequate right subclavian central venous catheter placement with no  postprocedural complication. 2. Cardiomegaly with central vascular congestion and interstitial pulmonary  edema, unchanged. Discussed with ICU team        Approx 65 minutes   minutes of critical care time spent in the direct evaluation and formulation of treatment plan of this high risk patient. The reason for providing this level of medical care was due a critical illness that impaired one or more vital organ systems such that there was a high probability of imminent or life threatening deterioration in the patients condition. This care involved high complexity decision making to assess, manipulate, and support vital system functions, to treat this degreee vital organ system failure and to prevent further life threatening deterioration of the patients condition.        Ronald Glover MD   [facility].- Nephrology

## 2021-08-27 NOTE — PROGRESS NOTES
Discussed with nephrologist.  Patient has been very noncompliant to dialysis. Last dialysis was on 8/18/2021. Patient has chronic diarrhea but no loose bowel movement since hospitalized. Nephrologist likely to DC Olivarez catheter; ordered to DC Olivarez catheter and discussed with RN. Change BMP to daily. Likely next dialysis tomorrow morning. Patient has 1 22-gauge PIV. Discussed with RN to make sure the PIV is working and DC central line before transferring out of ICU.       Seth Lopez MD 8/27/2021 11:25 AM

## 2021-08-27 NOTE — ED PROVIDER NOTES
EMERGENCY DEPARTMENT HISTORY AND PHYSICAL EXAM    Date: 8/26/2021  Patient Name: Adam Troy. History of Presenting Illness     Chief Complaint   Patient presents with    Low Blood Sugar    Hypertension         History Provided By: Patient    Additional History (Context):   8:39 PM  Adam Rodriguez is a 54 y.o. male with PMHX of hypertension, diabetes, heart failure, CKD 6 with 3 days a week dialysis but noncompliant, sickle trait, status post right below-knee amputation 2017 who presents to the emergency department with delirium and altered mental status. She arrived by EMS was reportedly found in his brother's home with a blood sugar of 43. Paramedics were unable to get an IV however gave him 2 rounds of oral glucose and increased blood sugar to 75. He showed up with tremors and shakes stating that he had fallen and hit his head earlier in the day. His blood pressure on arrival is 212/110. He gets dialysis 3 days a week and missed his previous scheduled appointment for Monday when he was in the hospital having diarrhea. He also did not go on Friday because he felt lousy. It is now Thursday evening coming up on Friday and he is uncertain if he want the Friday before that. During consultation in speaking with a nephrologist on-call he knows the patients that he is notoriously noncompliant. Patient cannot give us any further history. Social History  Denies smoking drinking or drugs    Family History  Positive for high blood pressure and diabetes.       PCP: Mary Lowry MD    Current Facility-Administered Medications   Medication Dose Route Frequency Provider Last Rate Last Admin    lidocaine (PF) (XYLOCAINE) 10 mg/mL (1 %) injection             sodium chloride 0.9 % bolus infusion 1,000 mL  1,000 mL IntraVENous ONCE Rebecca MESSINA MD 1,000 mL/hr at 08/27/21 0122 1,000 mL at 08/27/21 0122    dextrose 10% infusion  75 mL/hr IntraVENous CONTINUOUS MD Calli Medley insulin regular (NOVOLIN R, HUMULIN R) 100 Units in 0.9% sodium chloride 100 mL infusion  0-50 Units/hr IntraVENous TITRATE Luiza Graham MD        glucose chewable tablet 16 g  4 Tablet Oral PRN Luiza Graham MD        glucagon [facility]) injection 1 mg  1 mg IntraMUSCular PRN Luiza Graham MD        dextrose (D50W) injection syrg 12.5-25 g  25-50 mL IntraVENous PRN Luiza Graham MD        sodium chloride (NS) flush 5-10 mL  5-10 mL IntraVENous PRN Luiza Graham MD         Current Outpatient Medications   Medication Sig Dispense Refill    fidaxomicin (DIFICID) 200 mg tab tablet Take 1 Tab by mouth two (2) times a day. 14 Tab 0    doxazosin (CARDURA) 4 mg tablet Take 2 mg by mouth daily.  sodium hypochlorite 0.0125% (DAKINS SOLUTION) Apply 1 mL to affected area two (2) times a week.  omeprazole (PRILOSEC) 40 mg capsule Take 40 mg by mouth daily.  sevelamer carbonate (RENVELA) 800 mg tab tab Take 800 mg by mouth three (3) times daily (with meals).  sodium bicarbonate 650 mg tablet Take 650 mg by mouth three (3) times daily.  cefazolin sodium/dextrose,iso (CEFAZOLIN IN DEXTROSE, ISO-OS,) 2 gram/50 mL pgbk 50 mL by IntraVENous route every Monday. Mwf, 2-2-3 g after HD 50 mL 0    amLODIPine (NORVASC) 10 mg tablet Take 1 Tab by mouth daily. 30 Tab 0    carvedilol (COREG) 25 mg tablet Take 1 Tab by mouth two (2) times daily (with meals). 60 Tab 0    hydrALAZINE (APRESOLINE) 25 mg tablet Take 1 Tab by mouth three (3) times daily. 90 Tab 0    isosorbide mononitrate ER (IMDUR) 60 mg CR tablet Take 1 Tab by mouth daily. 30 Tab 0    furosemide (LASIX) 80 mg tablet Take 1 Tab by mouth two (2) times a day.  61 Tab 0       Past History     Past Medical History:  Past Medical History:   Diagnosis Date    Chronic kidney disease     Diabetes [facility] [address]     Heart failure ([facility] [address]     Hypertension     Sickle cell trait ([facility] [address]        Past Surgical History:  Past Surgical History:   Procedure Laterality Date    HX ORTHOPAEDIC      right BKA 2017       Family History:  No family history on file. Social History:  Social History     Tobacco Use    Smoking status: Former Smoker    Smokeless tobacco: Never Used   Substance Use Topics    Alcohol use: No    Drug use: No       Allergies: Allergies   Allergen Reactions    Penicillins Rash    Vancomycin Other (comments)     kayla syndrome         Review of Systems   Review of Systems   Unable to perform ROS: Mental status change   Respiratory: Positive for shortness of breath. Gastrointestinal: Positive for diarrhea. Psychiatric/Behavioral: Positive for confusion. Physical Exam     Vitals:    08/26/21 2315 08/27/21 0045 08/27/21 0100 08/27/21 0130   BP: (!) 191/91 (!) 188/79 (!) 159/95 (!) 165/100   Pulse: 84 86 86 86   Resp: 20 23 20 17   Temp: 98 °F (36.7 °C)      SpO2: 100% 99% 97% 100%   Weight:       Height:         Physical Exam  Vitals and nursing note reviewed. Constitutional:       General: He is not in acute distress. Appearance: He is well-developed. He is not diaphoretic. HENT:      Head: Normocephalic and atraumatic. Eyes:      General: No scleral icterus. Extraocular Movements:      Right eye: Normal extraocular motion. Left eye: Normal extraocular motion. Conjunctiva/sclera: Conjunctivae normal.      Pupils: Pupils are equal, round, and reactive to light. Neck:      Trachea: No tracheal deviation. Cardiovascular:      Rate and Rhythm: Normal rate and regular rhythm. Pulses:           Dorsalis pedis pulses are 1+ on the left side. Posterior tibial pulses are 1+ on the left side. Heart sounds: Normal heart sounds. Pulmonary:      Effort: Pulmonary effort is normal. No respiratory distress. Breath sounds: Normal breath sounds. No stridor. Abdominal:      General: Bowel sounds are normal. There is no distension. Palpations: Abdomen is soft. Tenderness: There is no abdominal tenderness. There is no rebound. Musculoskeletal:         General: No tenderness. Normal range of motion. Cervical back: Normal range of motion and neck supple. Comments: Left upper extremity with large AV fistula small area with palpable thrill associated with large aneurysm. Distal radial pulse intact and cap refill to all digits 2 to 3 seconds. Right Lower Extremity: Right leg is amputated below knee. Feet:      Left foot:      Skin integrity: Ulcer and dry skin present. Toenail Condition: Left toenails are abnormally thick. Comments: Large heel ulcer  Skin:     General: Skin is warm and dry. Capillary Refill: Capillary refill takes 2 to 3 seconds. Findings: No erythema or rash. Comments: Extensive scar tissue with diminished skin turgor. Neurological:      Mental Status: He is alert and oriented to person, place, and time. Cranial Nerves: No cranial nerve deficit. Motor: No weakness. Deep Tendon Reflexes: Reflexes are normal and symmetric. Psychiatric:         Mood and Affect: Mood normal.         Behavior: Behavior normal.         Thought Content: Thought content normal.         Judgment: Judgment normal.       Diagnostic Study Results     Labs -  Recent Results (from the past 24 hour(s))   GLUCOSE, POC    Collection Time: 08/26/21  6:34 PM   Result Value Ref Range    Glucose (POC) 71 70 - 110 mg/dL   CULTURE, WOUND W GRAM STAIN    Collection Time: 08/26/21  7:30 PM    Specimen: Foot;  Wound   Result Value Ref Range    Special Requests: NO SPECIAL REQUESTS      GRAM STAIN RARE WBCS SEEN      GRAM STAIN RARE GRAM POSITIVE RODS      Culture result: PENDING    GLUCOSE, POC    Collection Time: 08/26/21  7:33 PM   Result Value Ref Range    Glucose (POC) 125 (H) 70 - 110 mg/dL   CBC WITH AUTOMATED DIFF    Collection Time: 08/26/21  7:50 PM   Result Value Ref Range    WBC 3.4 (L) 4.6 - 13.2 K/uL    RBC 4.57 4.35 - 5.65 M/uL    HGB 11.6 (L) 13.0 - 16.0 g/dL    HCT 35.8 (L) 36.0 - 48.0 %    MCV 78.3 78.0 - 100.0 FL    MCH 25.4 24.0 - 34.0 PG    MCHC 32.4 31.0 - 37.0 g/dL    RDW 20.3 (H) 11.6 - 14.5 %    PLATELET 762 (L) 236 - 420 K/uL    NEUTROPHILS 83 (H) 40 - 73 %    LYMPHOCYTES 8 (L) 21 - 52 %    MONOCYTES 9 3 - 10 %    EOSINOPHILS 0 0 - 5 %    BASOPHILS 0 0 - 2 %    ABS. NEUTROPHILS 2.8 1.8 - 8.0 K/UL    ABS. LYMPHOCYTES 0.3 (L) 0.9 - 3.6 K/UL    ABS. MONOCYTES 0.3 0.05 - 1.2 K/UL    ABS. EOSINOPHILS 0.0 0.0 - 0.4 K/UL    ABS. BASOPHILS 0.0 0.0 - 0.1 K/UL    DF AUTOMATED      RBC COMMENTS ANISOCYTOSIS  1+       METABOLIC PANEL, COMPREHENSIVE    Collection Time: 08/26/21  7:50 PM   Result Value Ref Range    Sodium 137 136 - 145 mmol/L    Potassium 7.1 (HH) 3.5 - 5.5 mmol/L    Chloride 108 100 - 111 mmol/L    CO2 8 (LL) 21 - 32 mmol/L    Anion gap 21 (H) 3.0 - 18 mmol/L    Glucose 129 (H) 74 - 99 mg/dL     (H) 7.0 - 18 MG/DL    Creatinine 20.60 (H) 0.6 - 1.3 MG/DL    BUN/Creatinine ratio 7 (L) 12 - 20      GFR est AA 3 (L) >60 ml/min/1.73m2    GFR est non-AA 2 (L) >60 ml/min/1.73m2    Calcium 7.3 (L) 8.5 - 10.1 MG/DL    Bilirubin, total 0.5 0.2 - 1.0 MG/DL    ALT (SGPT) 26 16 - 61 U/L    AST (SGOT) 8 (L) 10 - 38 U/L    Alk.  phosphatase 84 45 - 117 U/L    Protein, total 8.4 (H) 6.4 - 8.2 g/dL    Albumin 3.3 (L) 3.4 - 5.0 g/dL    Globulin 5.1 (H) 2.0 - 4.0 g/dL    A-G Ratio 0.6 (L) 0.8 - 1.7     CARDIAC PANEL,(CK, CKMB & TROPONIN)    Collection Time: 08/26/21  7:50 PM   Result Value Ref Range    CK - MB 4.0 (H) <3.6 ng/ml    CK-MB Index 3.0 0.0 - 4.0 %     39 - 308 U/L    Troponin-I, QT 0.02 0.0 - 0.045 NG/ML   MAGNESIUM    Collection Time: 08/26/21  7:50 PM   Result Value Ref Range    Magnesium 3.1 (H) 1.6 - 2.6 mg/dL   PHOSPHORUS    Collection Time: 08/26/21  7:50 PM   Result Value Ref Range    Phosphorus 13.2 (H) 2.5 - 4.9 MG/DL   EKG, 12 LEAD, INITIAL    Collection Time: 08/26/21  7:59 PM   Result Value Ref Range Ventricular Rate 98 BPM    Atrial Rate 98 BPM    P-R Interval 172 ms    QRS Duration 92 ms    Q-T Interval 392 ms    QTC Calculation (Bezet) 500 ms    Calculated P Axis 77 degrees    Calculated R Axis -11 degrees    Calculated T Axis 58 degrees    Diagnosis       Normal sinus rhythm  Prolonged QT  Abnormal ECG  When compared with ECG of 20-AUG-2021 13:59,  Questionable change in QRS axis  Nonspecific T wave abnormality no longer evident in Inferior leads     POC LACTIC ACID    Collection Time: 08/27/21 12:47 AM   Result Value Ref Range    Lactic Acid (POC) 0.76 0.40 - 2.00 mmol/L   POC VENOUS BLOOD GAS    Collection Time: 08/27/21 12:54 AM   Result Value Ref Range    pH, venous (POC) 7.22 (L) 7.32 - 7.42      pCO2, venous (POC) 20.1 (L) 41 - 51 MMHG    pO2, venous (POC) 76 (H) 25 - 40 mmHg    HCO3, venous (POC) 8.2 (L) 23.0 - 28.0 MMOL/L    sO2, venous (POC) 92.6 (H) 65 - 88 %    Base deficit, venous (POC) 17.7 mmol/L    Specimen type (POC) VENOUS BLOOD      Performed by Mitra Ocampo (Traveler)     Critical value read back [place]         Radiologic Studies -   CT SPINE CERV WO CONT   Final Result      1. No fracture or dislocation. Please note that this CT was performed supine. It is highly sensitive for   fractures and dislocations but does not evaluate for ligamentous injury   including significant instability. If the patient's symptoms persist or if   otherwise clinically indicated, in erect plain film evaluation of the cervical   spine is suggested. CT HEAD WO CONT   Final Result      1. No CT findings of an acute intracranial abnormality. Please note that   noncontrast head CT may be normal in early acute infarct. XR CHEST PORT   Final Result      1. Plain film findings suggestive of edema edema/atelectasis, favoring fluid   overload over CHF given the patient's of dialysis history.       XR CHEST PORT    (Results Pending)     CT Results  (Last 48 hours)               08/26/21 2027  CT SPINE CERV WO CONT Final result    Impression:      1. No fracture or dislocation. Please note that this CT was performed supine. It is highly sensitive for   fractures and dislocations but does not evaluate for ligamentous injury   including significant instability. If the patient's symptoms persist or if   otherwise clinically indicated, in erect plain film evaluation of the cervical   spine is suggested. Narrative:  EXAM: CT CERVICAL SPINE       HISTORY: trauma, fall       COMPARISON: none       TECHNIQUE: Noncontrast axial CT scan of the cervical spine, utilizing standard   departmental technique. Axial and coronal reformations were also provided for   improved anatomic evaluation. One or more dose reduction techniques were used on this CT: automated exposure   control, adjustment of the mAs and/or kVp according to patient size, and   iterative reconstruction techniques. The specific techniques used on this CT   exam have been documented in the patient's electronic medical record. Digital   Imaging and Communications in Medicine (DICOM) format image data are available   to nonaffiliated external healthcare facilities or entities on a secure, media   free, reciprocally searchable basis with patient authorization for at least a   12-month period after this study. ______________________   FINDINGS:       VERTEBRAE AND DISCS: Vertebral alignment and articulation are within normal   limits. Vertebral body and disc space heights are maintained. Specifically, no   compression deformities are noted and there is no spondylolisthesis. No   displaced fractures are identified. There are no significant areas of bone   lucency or sclerosis. SPINAL CANAL AND FORAMINA: Patent without significant bony canal or foramina   encroachment. PREVERTEBRAL SOFT TISSUES: Normal.       VISIBLE PORTIONS OF POSTERIOR FOSSA/BRAIN: Normal.       LUNG APICES: Clear. OTHER: None. __________________       08/26/21 2025  CT HEAD WO CONT Final result    Impression:      1. No CT findings of an acute intracranial abnormality. Please note that   noncontrast head CT may be normal in early acute infarct. Narrative:  EXAM: CT HEAD WO CONT       CLINICAL INDICATION/HISTORY: altered with HTN, low BP   -Additional: None       COMPARISON: 3/2/2018       TECHNIQUE: Axial CT imaging of the head was performed without intravenous   contrast.   One or more dose reduction techniques were used on this CT: automated exposure   control, adjustment of the mAs and/or kVp according to patient size, and   iterative reconstruction techniques. The specific techniques used on this CT   exam have been documented in the patient's electronic medical record. Digital   Imaging and Communications in Medicine (DICOM) format image data are available   to nonaffiliated external healthcare facilities or entities on a secure, media   free, reciprocally searchable basis with patient authorization for at least a   12-month period after this study. _______________       FINDINGS:       BRAIN:      > Brain volume: Age appropriate.      > White matter: Little or no white matter disease. > Infarcts, encephalomalacia: None.      > Parenchymal mass: None.      > Parenchymal hemorrhage: None.      > Midline shift: None.      > Miscellaneous: None. EXTRA-AXIAL SPACES: Unremarkable. No fluid collections. CALVARIUM: Intact. SINUSES, MASTOIDS: Clear. OTHER EXTRACRANIAL: Unremarkable.       _______________               CXR Results  (Last 48 hours)               08/26/21 1951  XR CHEST PORT Final result    Impression:      1. Plain film findings suggestive of edema edema/atelectasis, favoring fluid   overload over CHF given the patient's of dialysis history.        Narrative:  EXAM: XR CHEST PORT       CLINICAL INDICATION/HISTORY: altered   -Additional: Dialysis       COMPARISON: 8/20/2021, 12/22/2020       TECHNIQUE: Frontal view of the chest       _______________       FINDINGS:       HEART AND MEDIASTINUM: Midline prominent cardiac silhouette, overall stable   appearance to prior exam. Stable mediastinal contours. Bilateral hilar vascular   congestion is present. LUNGS AND PLEURAL SPACES: Mild diffuse bilateral hazy indistinct interstitial   markings with perihilar to lower lung predominance. No consolidation,   pneumothorax or significant pleural effusion. BONY THORAX AND SOFT TISSUES: Chronic old healed right posterior seventh rib   fracture. _______________                 Medications given in the ED-  Medications   sodium chloride (NS) flush 5-10 mL (has no administration in time range)   lidocaine (PF) (XYLOCAINE) 10 mg/mL (1 %) injection (has no administration in time range)   sodium chloride 0.9 % bolus infusion 1,000 mL (1,000 mL IntraVENous New Bag 8/27/21 0122)   dextrose 10% infusion (has no administration in time range)   insulin regular (NOVOLIN R, HUMULIN R) 100 Units in 0.9% sodium chloride 100 mL infusion (has no administration in time range)   glucose chewable tablet 16 g (has no administration in time range)   glucagon (GLUCAGEN) injection 1 mg (has no administration in time range)   dextrose (D50W) injection syrg 12.5-25 g (has no administration in time range)   furosemide (LASIX) injection 40 mg (40 mg IntraVENous Given 8/26/21 2235)   labetaloL (NORMODYNE;TRANDATE) 20 mg/4 mL (5 mg/mL) injection 20 mg (20 mg IntraVENous Given 8/26/21 2236)   albuterol (PROVENTIL VENTOLIN) nebulizer solution 2.5 mg (2.5 mg Nebulization Given 8/26/21 2236)   lidocaine (URO-JET/ GLYDO) 2 % jelly (  Given 8/26/21 2250)   LORazepam (ATIVAN) injection 1 mg (1 mg IntraVENous Given 8/26/21 2344)         Medical Decision Making   I am the first provider for this patient.     I reviewed the vital signs, available nursing notes, past medical history, past surgical history, family history and social history. Vital Signs-Reviewed the patient's vital signs. Pulse Oximetry Analysis - 100% on room air    Cardiac Monitor:  Rate: 106 bpm  Rhythm: Sinus tach    EKG interpretation: (Preliminary)  7:59 AM    Normal sinus rhythm, rate 98, normal ST segments, there are peaked T waves, QTC is 500  EKG read by Bertha Parham MD      Records Reviewed: NURSING NOTES AND PREVIOUS MEDICAL RECORDS    Provider Notes (Medical Decision Making):   Patient seen is chronically ill and fatigued appearing. IV access on him is difficult similar to my placing central line in his right subclavian. His EKG did show peaked T waves,and as expected, did show peaked T waves but without bradycardia QRS. He has hand IV so bicarb was not used but albuterol insulin D10 was given as he was already normoglycemic with obvious DKA. He is noncompliant to the a.m. after discussing with neurology he is historically noncompliant. He was given broad-spectrum antibiotics to cover for sepsis including suspicious of pneumonia UTI diabetic foot ulcer with osteomyelitis. Unlikely this represents meningitis or encephalitis as his cognitive status is good and he does not have much of a headache. CT scan revealed no head or C-spine injury associated with this fall. Procedures:  Central Line    Date/Time: 8/27/2021 4:33 AM  Performed by: Ann Plata MD  Authorized by: Ann Plata MD     Consent:     Consent obtained:  Written    Consent given by:  Patient    Risks discussed:  Arterial puncture, bleeding, infection, incorrect placement, pneumothorax and nerve damage    Alternatives discussed:  No treatment, delayed treatment and alternative treatment  Pre-procedure details:     Hand hygiene: Hand hygiene performed prior to insertion      Sterile barrier technique:  All elements of maximal sterile technique followed      Skin preparation:  2% chlorhexidine and Betadine    Skin preparation agent: Skin preparation agent completely dried prior to procedure    Sedation:     Sedation type: Anxiolysis  Anesthesia (see MAR for exact dosages): Anesthesia method:  Local infiltration    Local anesthetic:  Lidocaine 1% w/o epi  Procedure details:     Location:  R subclavian    Site selection rationale:  Best clean sterile site with the least risk of infection or arterial puncture given patient agitation    Patient position:  Reverse Trendelenburg    Procedural supplies:  Triple lumen    Catheter size:  7 Fr    Landmarks identified: yes      Ultrasound guidance: no      Number of attempts:  1    Successful placement: yes    Post-procedure details:     Post-procedure:  Dressing applied and line sutured    Assessment:  Blood return through all ports, free fluid flow, no pneumothorax on x-ray and placement verified by x-ray    Patient tolerance of procedure: Tolerated well, no immediate complications  Comments: There was some mild bleeding at the access site and patient pulled part of out. Wound was sutured back in place. ED Course:   8:39 PM : Initial assessment performed. The patients presenting problems have been discussed, and they are in agreement with the care plan formulated and outlined with them. I have encouraged them to ask questions as they arise throughout their visit. 8:51 PM  Progress note  Critical lab findings suggest hyper K hyper with renal failure in addition to DKA but with a normal glycemic reading    11:15 PM  Progress note  Patient does have IV access however its tenuous. He has been getting albuterol insulin infusion as well as potassium to help correct the hyperkalemia. Case will need to be reviewed with nephrology ICU and hospitalist for is total care.    1:01 AM  Central line has been completed and confirmed correct usage.     CONSULT NOTE:   1:05 AM   Roselyn Adams MD   spoke with Dr. Rosemarie Nathan  Specialty: Nephrology  Discussed pt's hx, disposition, and available diagnostic and imaging results  over the telephone. Reviewed care plans. Consulting physician agrees with plans as outlined. He knows the patient quite well states he is notoriously noncompliant with his treatment. He will ask dialysis nursing staff to come in again treatment this evening immediately. Agrees with current management at this point. CONSULT NOTE:   1:10 AM   Imelda Santoro MD   spoke with Dr. Breanna Bruno  Specialty: ICU critical care specialist  Discussed pt's hx, disposition, and available diagnostic and imaging results  over the telephone. Reviewed care plans. Consulting physician agrees with plans as outlined. Agrees with current management will accept patient to the ICU. CONSULT NOTE:   2:25 AM   Imelda Santoro MD   spoke with Dr. Searcy Seip  Specialty: Hospitalist  Discussed pt's hx, disposition, and available diagnostic and imaging results  over the telephone. Reviewed care plans. Consulting physician agrees with plans as outlined. Agrees with current management except the care of the patient. Diagnosis and Disposition     Critical Care Time: 120 minutes  CRITICAL CARE NOTE:  4:59 a.m. I have spent 120 minutes of critical care time involved in lab review, consultations with specialist, family decision-making, and documentation. During this entire length of time I was immediately available to the patient. Critical Care: The reason for providing this level of medical care for this critically ill patient was due a critical illness that impaired one or more vital organ systems such that there was a high probability of imminent or life threatening deterioration in the patients condition. This care involved high complexity decision making to assess, manipulate, and support vital system functions, to treat this degreee vital organ system failure and to prevent further life threatening deterioration of the patients condition.     Core Measures:  For Hospitalized Patients:    1. Hospitalization Decision Time:  The decision to hospitalize the patient was made by Louis Bourgeois MD   at 8:45 PM on 8/27/2021    2. Aspirin: Aspirin was not given because the patient did not present with a stroke at the time of their Emergency Department evaluation    2:35 AM   Patient is being admitted to the hospital by Dr. Abimbola Alvarado. The results of their tests and reasons for their admission have been discussed with them and/or available family. They convey agreement and understanding for the need to be admitted and for their admission diagnosis. CONDITIONS ON ADMISSION:  Sepsis is present at the time of admission. Deep Vein Thrombosis is not present at the time of admission. Thrombosis is not present at the time of admission. Urinary Tract Infection is not present at the time of admission. Pneumonia is not present at the time of admission. Wound infection is present at the time of admission. Pressure Ulcer is present at the time of admission. CLINICAL IMPRESSION:    1. Type 2 diabetes mellitus with hypoglycemia without coma, unspecified whether long term insulin use ([address]    2. Uremia, acute    3. Acute renal failure superimposed on chronic kidney disease, on chronic dialysis, unspecified acute renal failure type ([address]    4. Hypertensive crisis        _______________________________    This note was partially transcribed via voice recognition software. Although efforts have been made to catch any discrepancies, it may contain sound alike words, grammatical errors, or nonsensical words.

## 2021-08-27 NOTE — H&P
History & Physical    Patient: Lizzeth Jaquez MRN: [id number]  CSN: [id number]    YOB: 1970  Age: 54 y.o. Sex: male      DOA: 8/26/2021    Chief Complaint:   Chief Complaint   Patient presents with    Low Blood Sugar    Hypertension          HPI:     Lizzeth Jaquez is a 54 y.o.  male who has history of chronic kidney disease on dialysis Monday Wednesday Friday, right BKA, diabetes, congestive heart failure last EF 40%, hypertension, sickle cell trait presents to the emergency room via EMS was found unresponsive. Blood sugar at that time was forty-three IV access was the photo obtained oral tablets were given until he became responsive. Upon arrival arousable patient became combative and required Ativan and had difficult IV access. Central line was placed with difficulty due to combativeness post x-ray central line shows no pneumothorax and correct placement however there is substantial amount of bleeding at the site. Patient missed dialysis for about a week he states that he was feeling too weak to go to dialysis. He has had intermittent bouts of diarrhea there is a history of C. difficile being positive back in December 2020. Patient was in our emergency room a repeat C. difficile was done which is essentially negative but initial was intermediate. Patient states that he still has bouts of diarrhea. He is fully vaccinated denied any known Covid exposures. He has his chronic foot ulcer that he has been taking care of himself but has not gotten any worse according to him on his left foot.   There is some tenderness when you touch in the emergency room patient was found to be severely hyperkalemic with nonspecific EKG changes but no peaked T's he was started on insulin dextrose/hyperkalemia protocol      Past Medical History:   Diagnosis Date    Chronic kidney disease     [facility]     Heart failure ([facility]     Hypertension     Sickle cell trait (HCC)        Past Surgical History:   Procedure Laterality Date    HX ORTHOPAEDIC      right BKA 2017       No family history on file. Social History     Socioeconomic History    Marital status: LEGALLY      Spouse name: Not on file    Number of children: Not on file    Years of education: Not on file    Highest education level: Not on file   Tobacco Use    Smoking status: Former Smoker    Smokeless tobacco: Never Used   Substance and Sexual Activity    Alcohol use: No    Drug use: No     Social Determinants of Health     Financial Resource Strain:     Difficulty of Paying Living Expenses:    Food Insecurity:     Worried About Running Out of Food in the Last Year:     [address] in the Last Year:    Transportation Needs:     Lack of Transportation (Medical):  Lack of Transportation (Non-Medical):    Physical Activity:     Days of Exercise per Week:     Minutes of Exercise per Session:    Stress:     Feeling of Stress :    Social Connections:     Frequency of Communication with Friends and Family:     Frequency of Social Gatherings with Friends and Family:     Attends Religion Services:     Active Member of Clubs or Organizations:     Attends Club or Organization Meetings:     Marital Status:        Prior to Admission medications    Medication Sig Start Date End Date Taking? Authorizing Provider   fidaxomicin (DIFICID) 200 mg tab tablet Take 1 Tab by mouth two (2) times a day. 12/24/20   Humza Gambino MD   doxazosin (CARDURA) 4 mg tablet Take 2 mg by mouth daily. Provider, Historical   sodium hypochlorite 0.0125% (DAKINS SOLUTION) Apply 1 mL to affected area two (2) times a week. Provider, Historical   omeprazole (PRILOSEC) 40 mg capsule Take 40 mg by mouth daily. Provider, Historical   sevelamer carbonate (RENVELA) 800 mg tab tab Take 800 mg by mouth three (3) times daily (with meals).     Provider, Historical   sodium bicarbonate 650 mg tablet Take 650 mg by mouth three (3) times daily.    Provider, Historical   cefazolin sodium/dextrose,iso (CEFAZOLIN IN DEXTROSE, ISO-OS,) 2 gram/50 mL pgbk 50 mL by IntraVENous route every Monday. Mwf, 2-2-3 g after HD 2/5/20   Srinivasa Walker MD   amLODIPine (NORVASC) 10 mg tablet Take 1 Tab by mouth daily. 3/8/18   Srinivasa Walker MD   carvedilol (COREG) 25 mg tablet Take 1 Tab by mouth two (2) times daily (with meals). 3/7/18   Srinivasa Walker MD   hydrALAZINE (APRESOLINE) 25 mg tablet Take 1 Tab by mouth three (3) times daily. 3/7/18   Srinivasa Walker MD   isosorbide mononitrate ER (IMDUR) 60 mg CR tablet Take 1 Tab by mouth daily. 3/8/18   Srinivasa Walker MD   furosemide (LASIX) 80 mg tablet Take 1 Tab by mouth two (2) times a day. 3/7/18   Srinivasa Walker MD       Allergies   Allergen Reactions    Penicillins Rash    Vancomycin Other (comments)     kayla syndrome         Review of Systems  GENERAL: Patient alert, awake and oriented times 3, able to sluggish to answer questions but answers questions communicate full sentences and not in distress. HEENT: No change in vision, no earache, tinnitus, sore throat or sinus congestion. NECK: No pain or stiffness. CT of the neck was negative for fracture  PULMONARY: No shortness of breath, cough or wheeze. X-ray shows pulmonary edema  Cardiovascular: no pnd or orthopnea, no CP known EF of forty but denies chest pain GASTROINTESTINAL: No abdominal pain, nausea, vomiting+diarrhea, melena or bright red blood per rectum. He describes his stool as green and changing colors but denies blood  GENITOURINARY: No urinary frequency, urgency, hesitancy or dysuria. Patient makes minimal urine  MUSCULOSKELETAL: No joint or muscle pain, no back pain, no recent trauma. Right bka left foot ulceration sometimes painful and he cares for himself  DERMATOLOGIC: No rash, no itching, left foot ulcer  ENDOCRINE: No polyuria, polydipsia, no heat or cold intolerance. No recent change in weight. HEMATOLOGICAL: Chronic anemia or easy bruising or bleeding.   Bleeding at triple-lumen site also known sickle cell trait  NEUROLOGIC: No headache, seizures,+ numbness, +tingling has worsening generalized weakness. Physical Exam:     Physical Exam:  Visit Vitals  BP (!) 165/100   Pulse 86   Temp 98 °F (36.7 °C)   Resp 17   Ht 5' 7.17\" (1.706 m)   Wt 78.9 kg (174 lb)   SpO2 100%   BMI 27.12 kg/m²      O2 Device: None (Room air)    Temp (24hrs), Av.8 °F (36.6 °C), Min:97.5 °F (36.4 °C), Max:98 °F (36.7 °C)    No intake/output data recorded. No intake/output data recorded. General:  Alert, cooperative, no distress, appears older than stated a age and history age also disheveled              Head: Normocephalic, without obvious abnormality, atraumatic. Eyes:  Conjunctivae/corneas clear. PERRL, EOMs intact. Nose: Nares normal. No drainage or sinus tenderness. Neck: Supple, symmetrical, trachea midline, no adenopathy, thyroid: no enlargement, no carotid bruit and no JVD. Lungs:   Clear to auscultation bilaterally. Crackles left base   Heart:  Regular rate and rhythm, S1, S2 normal.  S4     Abdomen: Soft, non-tender. Bowel sounds normal.    Extremities: Right bka left foot ulceration quarter sized in nature clean base no eschar   Pulses: 2+ and symmetric all extremities. Skin:  No rashes left foot ulcer chronic venous stasis changes of left leg right stump in place with prosthesis   Neurologic: AAOx3, No focal motor or sensory deficit. Labs Reviewed:  Recent Results (from the past 12 hour(s))   GLUCOSE, POC    Collection Time: 21  6:34 PM   Result Value Ref Range    Glucose (POC) 71 70 - 110 mg/dL   CULTURE, WOUND W GRAM STAIN    Collection Time: 21  7:30 PM    Specimen: Foot;  Wound   Result Value Ref Range    Special Requests: NO SPECIAL REQUESTS      GRAM STAIN RARE WBCS SEEN      GRAM STAIN RARE GRAM POSITIVE RODS      Culture result: PENDING    GLUCOSE, POC    Collection Time: 21  7:33 PM   Result Value Ref Range    Glucose (POC) 125 (H) 70 - 110 mg/dL   CBC WITH AUTOMATED DIFF    Collection Time: 08/26/21  7:50 PM   Result Value Ref Range    WBC 3.4 (L) 4.6 - 13.2 K/uL    RBC 4.57 4.35 - 5.65 M/uL    HGB 11.6 (L) 13.0 - 16.0 g/dL    HCT 35.8 (L) 36.0 - 48.0 %    MCV 78.3 78.0 - 100.0 FL    MCH 25.4 24.0 - 34.0 PG    MCHC 32.4 31.0 - 37.0 g/dL    RDW 20.3 (H) 11.6 - 14.5 %    PLATELET 421 (L) 948 - 420 K/uL    NEUTROPHILS 83 (H) 40 - 73 %    LYMPHOCYTES 8 (L) 21 - 52 %    MONOCYTES 9 3 - 10 %    EOSINOPHILS 0 0 - 5 %    BASOPHILS 0 0 - 2 %    ABS. NEUTROPHILS 2.8 1.8 - 8.0 K/UL    ABS. LYMPHOCYTES 0.3 (L) 0.9 - 3.6 K/UL    ABS. MONOCYTES 0.3 0.05 - 1.2 K/UL    ABS. EOSINOPHILS 0.0 0.0 - 0.4 K/UL    ABS. BASOPHILS 0.0 0.0 - 0.1 K/UL    DF AUTOMATED      RBC COMMENTS ANISOCYTOSIS  1+       METABOLIC PANEL, COMPREHENSIVE    Collection Time: 08/26/21  7:50 PM   Result Value Ref Range    Sodium 137 136 - 145 mmol/L    Potassium 7.1 (HH) 3.5 - 5.5 mmol/L    Chloride 108 100 - 111 mmol/L    CO2 8 (LL) 21 - 32 mmol/L    Anion gap 21 (H) 3.0 - 18 mmol/L    Glucose 129 (H) 74 - 99 mg/dL     (H) 7.0 - 18 MG/DL    Creatinine 20.60 (H) 0.6 - 1.3 MG/DL    BUN/Creatinine ratio 7 (L) 12 - 20      GFR est AA 3 (L) >60 ml/min/1.73m2    GFR est non-AA 2 (L) >60 ml/min/1.73m2    Calcium 7.3 (L) 8.5 - 10.1 MG/DL    Bilirubin, total 0.5 0.2 - 1.0 MG/DL    ALT (SGPT) 26 16 - 61 U/L    AST (SGOT) 8 (L) 10 - 38 U/L    Alk.  phosphatase 84 45 - 117 U/L    Protein, total 8.4 (H) 6.4 - 8.2 g/dL    Albumin 3.3 (L) 3.4 - 5.0 g/dL    Globulin 5.1 (H) 2.0 - 4.0 g/dL    A-G Ratio 0.6 (L) 0.8 - 1.7     CARDIAC PANEL,(CK, CKMB & TROPONIN)    Collection Time: 08/26/21  7:50 PM   Result Value Ref Range    CK - MB 4.0 (H) <3.6 ng/ml    CK-MB Index 3.0 0.0 - 4.0 %     39 - 308 U/L    Troponin-I, QT 0.02 0.0 - 0.045 NG/ML   MAGNESIUM    Collection Time: 08/26/21  7:50 PM   Result Value Ref Range    Magnesium 3.1 (H) 1.6 - 2.6 mg/dL   PHOSPHORUS    Collection Time: 08/26/21  7:50 PM   Result Value Ref Range    Phosphorus 13.2 (H) 2.5 - 4.9 MG/DL   EKG, 12 LEAD, INITIAL    Collection Time: 08/26/21  7:59 PM   Result Value Ref Range    Ventricular Rate 98 BPM    Atrial Rate 98 BPM    P-R Interval 172 ms    QRS Duration 92 ms    Q-T Interval 392 ms    QTC Calculation (Bezet) 500 ms    Calculated P Axis 77 degrees    Calculated R Axis -11 degrees    Calculated T Axis 58 degrees    Diagnosis       Normal sinus rhythm  Prolonged QT  Abnormal ECG  When compared with ECG of 20-AUG-2021 13:59,  Questionable change in QRS axis  Nonspecific T wave abnormality no longer evident in Inferior leads     POC LACTIC ACID    Collection Time: 08/27/21 12:47 AM   Result Value Ref Range    Lactic Acid (POC) 0.76 0.40 - 2.00 mmol/L   POC VENOUS BLOOD GAS    Collection Time: 08/27/21 12:54 AM   Result Value Ref Range    pH, venous (POC) 7.22 (L) 7.32 - 7.42      pCO2, venous (POC) 20.1 (L) 41 - 51 MMHG    pO2, venous (POC) 76 (H) 25 - 40 mmHg    HCO3, venous (POC) 8.2 (L) 23.0 - 28.0 MMOL/L    sO2, venous (POC) 92.6 (H) 65 - 88 %    Base deficit, venous (POC) 17.7 mmol/L    Specimen type (POC) VENOUS BLOOD      Performed by Mari Bell ([employer])     Critical value read back CASA    GLUCOSE, POC    Collection Time: 08/27/21  1:49 AM   Result Value Ref Range    Glucose (POC) 112 (H) 70 - 110 mg/dL     All lab results for the last 24 hours reviewed. and EKG    Procedures/imaging: see electronic medical records for all procedures/Xrays and details which were not copied into this note but were reviewed prior to creation of Plan    Recent Results (from the past 12 hour(s))   GLUCOSE, POC    Collection Time: 08/26/21  6:34 PM   Result Value Ref Range    Glucose (POC) 71 70 - 110 mg/dL   CULTURE, WOUND W GRAM STAIN    Collection Time: 08/26/21  7:30 PM    Specimen: Foot;  Wound   Result Value Ref Range    Special Requests: NO SPECIAL REQUESTS      GRAM STAIN RARE WBCS SEEN      GRAM STAIN RARE GRAM POSITIVE RODS Culture result: PENDING    GLUCOSE, POC    Collection Time: 08/26/21  7:33 PM   Result Value Ref Range    Glucose (POC) 125 (H) 70 - 110 mg/dL   CBC WITH AUTOMATED DIFF    Collection Time: 08/26/21  7:50 PM   Result Value Ref Range    WBC 3.4 (L) 4.6 - 13.2 K/uL    RBC 4.57 4.35 - 5.65 M/uL    HGB 11.6 (L) 13.0 - 16.0 g/dL    HCT 35.8 (L) 36.0 - 48.0 %    MCV 78.3 78.0 - 100.0 FL    MCH 25.4 24.0 - 34.0 PG    MCHC 32.4 31.0 - 37.0 g/dL    RDW 20.3 (H) 11.6 - 14.5 %    PLATELET 424 (L) 826 - 420 K/uL    NEUTROPHILS 83 (H) 40 - 73 %    LYMPHOCYTES 8 (L) 21 - 52 %    MONOCYTES 9 3 - 10 %    EOSINOPHILS 0 0 - 5 %    BASOPHILS 0 0 - 2 %    ABS. NEUTROPHILS 2.8 1.8 - 8.0 K/UL    ABS. LYMPHOCYTES 0.3 (L) 0.9 - 3.6 K/UL    ABS. MONOCYTES 0.3 0.05 - 1.2 K/UL    ABS. EOSINOPHILS 0.0 0.0 - 0.4 K/UL    ABS. BASOPHILS 0.0 0.0 - 0.1 K/UL    DF AUTOMATED      RBC COMMENTS ANISOCYTOSIS  1+       METABOLIC PANEL, COMPREHENSIVE    Collection Time: 08/26/21  7:50 PM   Result Value Ref Range    Sodium 137 136 - 145 mmol/L    Potassium 7.1 (HH) 3.5 - 5.5 mmol/L    Chloride 108 100 - 111 mmol/L    CO2 8 (LL) 21 - 32 mmol/L    Anion gap 21 (H) 3.0 - 18 mmol/L    Glucose 129 (H) 74 - 99 mg/dL     (H) 7.0 - 18 MG/DL    Creatinine 20.60 (H) 0.6 - 1.3 MG/DL    BUN/Creatinine ratio 7 (L) 12 - 20      GFR est AA 3 (L) >60 ml/min/1.73m2    GFR est non-AA 2 (L) >60 ml/min/1.73m2    Calcium 7.3 (L) 8.5 - 10.1 MG/DL    Bilirubin, total 0.5 0.2 - 1.0 MG/DL    ALT (SGPT) 26 16 - 61 U/L    AST (SGOT) 8 (L) 10 - 38 U/L    Alk.  phosphatase 84 45 - 117 U/L    Protein, total 8.4 (H) 6.4 - 8.2 g/dL    Albumin 3.3 (L) 3.4 - 5.0 g/dL    Globulin 5.1 (H) 2.0 - 4.0 g/dL    A-G Ratio 0.6 (L) 0.8 - 1.7     CARDIAC PANEL,(CK, CKMB & TROPONIN)    Collection Time: 08/26/21  7:50 PM   Result Value Ref Range    CK - MB 4.0 (H) <3.6 ng/ml    CK-MB Index 3.0 0.0 - 4.0 %     39 - 308 U/L    Troponin-I, QT 0.02 0.0 - 0.045 NG/ML   MAGNESIUM    Collection Time: 08/26/21  7:50 PM   Result Value Ref Range    Magnesium 3.1 (H) 1.6 - 2.6 mg/dL   PHOSPHORUS    Collection Time: 08/26/21  7:50 PM   Result Value Ref Range    Phosphorus 13.2 (H) 2.5 - 4.9 MG/DL   EKG, 12 LEAD, INITIAL    Collection Time: 08/26/21  7:59 PM   Result Value Ref Range    Ventricular Rate 98 BPM    Atrial Rate 98 BPM    P-R Interval 172 ms    QRS Duration 92 ms    Q-T Interval 392 ms    QTC Calculation (Bezet) 500 ms    Calculated P Axis 77 degrees    Calculated R Axis -11 degrees    Calculated T Axis 58 degrees    Diagnosis       Normal sinus rhythm  Prolonged QT  Abnormal ECG  When compared with ECG of 20-AUG-2021 13:59,  Questionable change in QRS axis  Nonspecific T wave abnormality no longer evident in Inferior leads     POC LACTIC ACID    Collection Time: 08/27/21 12:47 AM   Result Value Ref Range    Lactic Acid (POC) 0.76 0.40 - 2.00 mmol/L   POC VENOUS BLOOD GAS    Collection Time: 08/27/21 12:54 AM   Result Value Ref Range    pH, venous (POC) 7.22 (L) 7.32 - 7.42      pCO2, venous (POC) 20.1 (L) 41 - 51 MMHG    pO2, venous (POC) 76 (H) 25 - 40 mmHg    HCO3, venous (POC) 8.2 (L) 23.0 - 28.0 MMOL/L    sO2, venous (POC) 92.6 (H) 65 - 88 %    Base deficit, venous (POC) 17.7 mmol/L    Specimen type (POC) VENOUS BLOOD      Performed by Lamona Opitz ([employer])     Critical value read back CASA    GLUCOSE, POC    Collection Time: 08/27/21  1:49 AM   Result Value Ref Range    Glucose (POC) 112 (H) 70 - 110 mg/dL     Assessment/Plan     Active Problems:  1..  Cardiovascular hemodynamically stable on no pressors he has accelerated hypertension starting on labetalol IV should improve with dialysis there is also pulmonary edema on x-ray but patient is on room air and also she will improve with dialysis and echo cardiogram will be ordered as well as cardiac enzymes     2.. Endocrine   hypoglycemia patient is on a D10 drip which can be DC'd and tapered down we will also check a TSH    3..  Fluid electrolyte hyperkalemia from missed dialysis should improve with dialysis appreciate nephrology consult    4. Infectious disease there is evidence of diabetic foot infection bilateral infiltrates he is covered with cefepime and Levaquin for now we will obtain MRI and vascular studies of the leg and have podiatry see in consultation Covid PCR was obtained and C. difficile cultures were repeated note is right at the 7-day, where this could be rejected    Five.  end-stage renal disease with missed dialysis  Emergent dialysis being arranged    6..  Hematology monitor CBC and platelets monitor for bleeding triple-lumen site is being reinforced we will hold heparin for now patient with sickle cell trait monitor hemoglobin transfuse for under seven    7. GI: She was prophylaxis with Protonix enteric stool cultures obtained for diarrhea repeat C. difficile for diarrhea    8.   Neuro Ativan as needed for agitation  Blood sugar check toxicology screen    DVT/GI Prophylaxis: heparin Protonix          Antwan Ruff MD  8/27/2021 1:52 AM

## 2021-08-27 NOTE — CONSULTS
Podiatry Consult    Subjective:         Date of Consultation: August 27, 2021     Referring Physician: Dr. Shahzad Swann    Patient is a 54 y.o.  male who is being seen for left heel infected open wound on the bottom. Patient is a poor historian but states that he is a patient of Dr. Elmer Rivera, who had to do left heel surgery a few years ago, and since then his wound opens up on the bottom. He is not sure when it started this time but about 1-2 months. He has not seen Dr. Marifer Koroma yet. Patient Active Problem List    Diagnosis Date Noted    Hyperkalemia 08/27/2021    Hypertensive crisis 08/27/2021    Uremia 08/27/2021    Hypoglycemia associated with diabetes ([address] 08/27/2021    Non-compliance with renal dialysis ([address] 08/27/2021    Infective proctitis 12/24/2020    Hypertensive urgency 12/22/2020    Abdominal pain with vomiting 12/22/2020    Diarrhea 12/22/2020    Hypoglycemia 01/30/2020    Dehydration 01/29/2020    ESRD (end stage renal disease) on dialysis ([address] 01/29/2020    Cellulitis and abscess of foot 01/29/2020    HTN (hypertension) 01/29/2020    Acute osteomyelitis ([address] 01/29/2020    Hx of BKA, right ([address] 01/29/2020    Esophagitis 01/29/2020    Iron deficiency anemia 03/04/2018    Other restrictive cardiomyopathy ([address] 03/04/2018    Hyponatremia 02/28/2018    Anemia of chronic disease 02/28/2018    Acute on chronic renal insufficiency 02/28/2018    Acute exacerbation of CHF (congestive heart failure) ([address] 02/28/2018     Past Medical History:   Diagnosis Date    Chronic kidney disease     Diabetes ([address]     Heart failure ([address]     Hypertension     Sickle cell trait ([address]       Past Surgical History:   Procedure Laterality Date    HX ORTHOPAEDIC      right BKA 2017      No family history on file.    Social History     Tobacco Use    Smoking status: Former Smoker    Smokeless tobacco: Never Used   Substance Use Topics    Alcohol use: No     Current Facility-Administered Medications   Medication Dose Route Frequency Provider Last Rate Last Admin    acetaminophen (TYLENOL) tablet 650 mg  650 mg Oral Q6H PRN Carissa Watson MD   650 mg at 08/27/21 1000    Or    acetaminophen (TYLENOL) suppository 650 mg  650 mg Rectal Q6H PRN Carissa Watson MD        polyethylene glycol (MIRALAX) packet 17 g  17 g Oral DAILY PRN Carissa Watson MD        [Held by provider] heparin (porcine) injection 5,000 Units  5,000 Units SubCUTAneous Q8H Carissa Watson MD        promethazine (PHENERGAN) tablet 12.5 mg  12.5 mg Oral Q6H PRN Carissa Watson MD        Or    ondansetron (ZOFRAN) injection 4 mg  4 mg IntraVENous Q6H PRN Carissa Watson MD        pantoprazole (PROTONIX) injection 40 mg  40 mg IntraVENous Q24H Carissa Watson MD   40 mg at 08/27/21 0329    cefepime (MAXIPIME) 1 g in sterile water (preservative free) 10 mL IV syringe  1 g IntraVENous Q8H Carissa Watson MD   1 g at 08/27/21 1149    [START ON 8/29/2021] levoFLOXacin (LEVAQUIN) 500 mg in D5W IVPB  500 mg IntraVENous Q48H Carissa Watson MD        sodium chloride (NS) flush 5-40 mL  5-40 mL IntraVENous Q8H Carissa Watson MD   10 mL at 08/27/21 1417    sodium chloride (NS) flush 5-40 mL  5-40 mL IntraVENous PRN Carissa Watson MD        sodium polystyrene (KAYEXALATE) 15 gram/60 mL oral suspension 15 g  15 g Oral NOW Carissa Watson MD        insulin lispro (HUMALOG) injection   SubCUTAneous Q4H Jef San MD        amLODIPine (NORVASC) tablet 10 mg  10 mg Oral DAILY Jef San MD   10 mg at 08/27/21 1149    carvediloL (COREG) tablet 25 mg  25 mg Oral BID WITH MEALS Jef San MD   25 mg at 08/27/21 1149    isosorbide mononitrate ER (IMDUR) tablet 60 mg  60 mg Oral DAILY Jef San MD   60 mg at 08/27/21 1149    hydrALAZINE (APRESOLINE) tablet 50 mg  50 mg Oral TID Kita Quintero MD        sevelamer carbonate (RENVELA) tab 1,600 mg  1,600 mg Oral TID WITH MEALS José Alston MD   1,600 mg at 21 1149    hydrALAZINE (APRESOLINE) 20 mg/mL injection 10 mg  10 mg IntraVENous Q6H PRN José Alston MD        heparin (porcine) 1,000 unit/mL injection 2,000 Units  2,000 Units Hemodialysis DIALYSIS PRN José Alston MD        epoetin delnote-epbx (RETACRIT) injection 4,000 Units  4,000 Units IntraVENous ONCE José Alston MD        sodium chloride (NS) flush 5-10 mL  5-10 mL IntraVENous PRN Stacey Soto MD          Allergies   Allergen Reactions    Penicillins Rash    Vancomycin Other (comments)     kayla syndrome        Review of Systems:  A comprehensive review of systems was negative except for that written in the History of Present Illness. Objective:     Patient Vitals for the past 8 hrs:   BP Temp Pulse Resp SpO2 Height Weight   21 1300 (!) 165/74  100 15 95 %     21 1200 (!) 181 99.1 °F (37.3 °C) (!) 104 23 94 %     21 1100 (!) 158/118  (!) 104 16 97 %     21 1030 (!) 182/  (!) 103 19 96 %     21 1000 (!) 192/  (!) 104 15 99 %     21 0923 (!) 192     5' 7\" (1.702 m) 78.9 kg (174 lb)   21 0900 (!) 201/86  (!) 105 21 94 %     21 0800 (!) 181/69 98.6 °F (37 °C) (!) 102 20 97 %     21 0747  98.6 °F (37 °C)        21 0745 (!) 192/ 98.6 °F (37 °C) (!) 104 20 99 %     21 0730 (!) 184/74  (!) 102 21 100 %     21 0715 (!) 187/77  (!) 101 23 100 %     21 0701 (!) 203/79  98 27 100 %       Temp (24hrs), Av.3 °F (36.8 °C), Min:97.5 °F (36.4 °C), Max:99.1 °F (37.3 °C)      Physical Exam:      Vascular:  Dorsalis pedis pulse is 2/4 left. Posterior tibial pulse is 2/4 left. Capillary fill time is less than 3 seconds, left. Edema is not observed left lower extremity. Varicosities arenot  observed left lower extremity.    Derm:  Skin temperature of lower extremities warm to cool proximal to distal bilateral.  Inspection of skin shows open ulcer plantar left heel 1x 2.5 x .02 cm, full thickness deep to the subcutaneous level, with pink granular base, positive purulent drainage, and slight surrounding erythema. Negative probing, negative tracking. Ortho:  Muscle strength 3/5 for all groups tested. Muscle tone normal. Inspection/palpation of bones - joints- muscle shows - within normal limits left lower extremity. Status post right BKA. Neuro:  Light touch, sharp/dull, vibratory, and proprioception sensations all decreased left lower extremity. Deep tendon reflexes decreased left. Lab Review:   Recent Results (from the past 24 hour(s))   GLUCOSE, POC    Collection Time: 08/26/21  6:34 PM   Result Value Ref Range    Glucose (POC) 71 70 - 110 mg/dL   CULTURE, WOUND W GRAM STAIN    Collection Time: 08/26/21  7:30 PM    Specimen: Foot; Wound   Result Value Ref Range    Special Requests: NO SPECIAL REQUESTS      GRAM STAIN RARE WBCS SEEN      GRAM STAIN RARE GRAM POSITIVE RODS      Culture result: PENDING    GLUCOSE, POC    Collection Time: 08/26/21  7:33 PM   Result Value Ref Range    Glucose (POC) 125 (H) 70 - 110 mg/dL   CBC WITH AUTOMATED DIFF    Collection Time: 08/26/21  7:50 PM   Result Value Ref Range    WBC 3.4 (L) 4.6 - 13.2 K/uL    RBC 4.57 4.35 - 5.65 M/uL    HGB 11.6 (L) 13.0 - 16.0 g/dL    HCT 35.8 (L) 36.0 - 48.0 %    MCV 78.3 78.0 - 100.0 FL    MCH 25.4 24.0 - 34.0 PG    MCHC 32.4 31.0 - 37.0 g/dL    RDW 20.3 (H) 11.6 - 14.5 %    PLATELET 699 (L) 657 - 420 K/uL    NEUTROPHILS 83 (H) 40 - 73 %    LYMPHOCYTES 8 (L) 21 - 52 %    MONOCYTES 9 3 - 10 %    EOSINOPHILS 0 0 - 5 %    BASOPHILS 0 0 - 2 %    ABS. NEUTROPHILS 2.8 1.8 - 8.0 K/UL    ABS. LYMPHOCYTES 0.3 (L) 0.9 - 3.6 K/UL    ABS. MONOCYTES 0.3 0.05 - 1.2 K/UL    ABS. EOSINOPHILS 0.0 0.0 - 0.4 K/UL    ABS.  BASOPHILS 0.0 0.0 - 0.1 K/UL    DF AUTOMATED      RBC COMMENTS ANISOCYTOSIS  1+       METABOLIC PANEL, COMPREHENSIVE Collection Time: 08/26/21  7:50 PM   Result Value Ref Range    Sodium 137 136 - 145 mmol/L    Potassium 7.1 (HH) 3.5 - 5.5 mmol/L    Chloride 108 100 - 111 mmol/L    CO2 8 (LL) 21 - 32 mmol/L    Anion gap 21 (H) 3.0 - 18 mmol/L    Glucose 129 (H) 74 - 99 mg/dL     (H) 7.0 - 18 MG/DL    Creatinine 20.60 (H) 0.6 - 1.3 MG/DL    BUN/Creatinine ratio 7 (L) 12 - 20      GFR est AA 3 (L) >60 ml/min/1.73m2    GFR est non-AA 2 (L) >60 ml/min/1.73m2    Calcium 7.3 (L) 8.5 - 10.1 MG/DL    Bilirubin, total 0.5 0.2 - 1.0 MG/DL    ALT (SGPT) 26 16 - 61 U/L    AST (SGOT) 8 (L) 10 - 38 U/L    Alk.  phosphatase 84 45 - 117 U/L    Protein, total 8.4 (H) 6.4 - 8.2 g/dL    Albumin 3.3 (L) 3.4 - 5.0 g/dL    Globulin 5.1 (H) 2.0 - 4.0 g/dL    A-G Ratio 0.6 (L) 0.8 - 1.7     CARDIAC PANEL,(CK, CKMB & TROPONIN)    Collection Time: 08/26/21  7:50 PM   Result Value Ref Range    CK - MB 4.0 (H) <3.6 ng/ml    CK-MB Index 3.0 0.0 - 4.0 %     39 - 308 U/L    Troponin-I, QT 0.02 0.0 - 0.045 NG/ML   MAGNESIUM    Collection Time: 08/26/21  7:50 PM   Result Value Ref Range    Magnesium 3.1 (H) 1.6 - 2.6 mg/dL   PHOSPHORUS    Collection Time: 08/26/21  7:50 PM   Result Value Ref Range    Phosphorus 13.2 (H) 2.5 - 4.9 MG/DL   HEMOGLOBIN A1C WITH EAG    Collection Time: 08/26/21  7:50 PM   Result Value Ref Range    Hemoglobin A1c 4.7 4.2 - 5.6 %    Est. average glucose 88 mg/dL   EKG, 12 LEAD, INITIAL    Collection Time: 08/26/21  7:59 PM   Result Value Ref Range    Ventricular Rate 98 BPM    Atrial Rate 98 BPM    P-R Interval 172 ms    QRS Duration 92 ms    Q-T Interval 392 ms    QTC Calculation (Bezet) 500 ms    Calculated P Axis 77 degrees    Calculated R Axis -11 degrees    Calculated T Axis 58 degrees    Diagnosis       Normal sinus rhythm  Prolonged QT  Abnormal ECG  When compared with ECG of 20-AUG-2021 13:59,  Questionable change in QRS axis  Nonspecific T wave abnormality no longer evident in Inferior leads     RESPIRATORY VIRUS PANEL W/COVID-19, PCR    Collection Time: 08/26/21 10:00 PM    Specimen: Nasopharyngeal   Result Value Ref Range    Adenovirus Not detected NOTD      Coronavirus 229E Not detected NOTD      Coronavirus HKU1 Not detected NOTD      Coronavirus CVNL63 Not detected NOTD      Coronavirus OC43 Not detected NOTD      SARS-CoV-2, PCR Not detected NOTD      Metapneumovirus Not detected NOTD      Rhinovirus and Enterovirus Not detected NOTD      Influenza A Not detected NOTD      Influenza A, subtype H1 Not detected NOTD      Influenza A, subtype H3 Not detected NOTD      INFLUENZA A H1N1 PCR Not detected NOTD      Influenza B Not detected NOTD      Parainfluenza 1 Not detected NOTD      Parainfluenza 2 Not detected NOTD      Parainfluenza 3 Not detected NOTD      Parainfluenza virus 4 Not detected NOTD      RSV by PCR Not detected NOTD      B. parapertussis, PCR Not detected NOTD      Bordetella pertussis - PCR Not detected NOTD      Chlamydophila pneumoniae DNA, QL, PCR Not detected NOTD      Mycoplasma pneumoniae DNA, QL, PCR Not detected NOTD     CULTURE, BLOOD    Collection Time: 08/27/21 12:30 AM    Specimen: Blood   Result Value Ref Range    Special Requests: NO SPECIAL REQUESTS      Culture result: NO GROWTH AFTER 9 HOURS     CULTURE, BLOOD    Collection Time: 08/27/21 12:45 AM    Specimen: Blood   Result Value Ref Range    Special Requests: NO SPECIAL REQUESTS      Culture result: NO GROWTH AFTER 9 HOURS     POC LACTIC ACID    Collection Time: 08/27/21 12:47 AM   Result Value Ref Range    Lactic Acid (POC) 0.76 0.40 - 2.00 mmol/L   POC VENOUS BLOOD GAS    Collection Time: 08/27/21 12:54 AM   Result Value Ref Range    pH, venous (POC) 7.22 (L) 7.32 - 7.42      pCO2, venous (POC) 20.1 (L) 41 - 51 MMHG    pO2, venous (POC) 76 (H) 25 - 40 mmHg    HCO3, venous (POC) 8.2 (L) 23.0 - 28.0 MMOL/L    sO2, venous (POC) 92.6 (H) 65 - 88 %    Base deficit, venous (POC) 17.7 mmol/L    Specimen type (POC) VENOUS BLOOD Performed by Kavita IBARRA [facility])     Critical value read huong CORMIER    GLUCOSE, POC    Collection Time: 08/27/21  1:49 AM   Result Value Ref Range    Glucose (POC) 112 (H) 70 - 110 mg/dL   GLUCOSTABILIZER    Collection Time: 08/27/21  1:53 AM   Result Value Ref Range    Glucose 112 mg/dL    Insulin order 1.0 units/hour    Insulin adminstered 1.0 units/hour    Multiplier 0.020     Low target 140 mg/dL    High target 180 mg/dL    D50 order 0.0 ml    D50 administered 0.00 ml    Minutes until next BG 60 min    Order initials      Administered initials     GLUCOSE, POC    Collection Time: 08/27/21  3:03 AM   Result Value Ref Range    Glucose (POC) 124 (H) 70 - 110 mg/dL   GLUCOSTABILIZER    Collection Time: 08/27/21  3:04 AM   Result Value Ref Range    Glucose 124 mg/dL    Insulin order 0.6 units/hour    Insulin adminstered 0.6 units/hour    Multiplier 0.010     Low target 140 mg/dL    High target 180 mg/dL    D50 order 0.0 ml    D50 administered 0.00 ml    Minutes until next BG 60 min    Order initials      Administered initials     GLUCOSE, POC    Collection Time: 08/27/21  4:38 AM   Result Value Ref Range    Glucose (POC) 140 (H) 70 - 110 mg/dL   GLUCOSTABILIZER    Collection Time: 08/27/21  4:38 AM   Result Value Ref Range    Glucose 140 mg/dL    Insulin order 0.8 units/hour    Insulin adminstered 0.8 units/hour    Multiplier 0.010     Low target 140 mg/dL    High target 180 mg/dL    D50 order 0.0 ml    D50 administered 0.00 ml    Minutes until next BG 60 min    Order initials [facility]     Administered initials jl    LACTIC ACID    Collection Time: 08/27/21  5:00 AM   Result Value Ref Range    Lactic acid 1.1 0.4 - 2.0 MMOL/L   CBC WITH AUTOMATED DIFF    Collection Time: 08/27/21  5:00 AM   Result Value Ref Range    WBC 4.5 (L) 4.6 - 13.2 K/uL    RBC 3.83 (L) 4.35 - 5.65 M/uL    HGB 9.8 (L) 13.0 - 16.0 g/dL    HCT 29.2 (L) 36.0 - 48.0 %    MCV 76.2 (L) 78.0 - 100.0 FL    MCH 25.6 24.0 - 34.0 PG    MCHC 33.6 31.0 - 37.0 g/dL    RDW 19.8 (H) 11.6 - 14.5 %    PLATELET 97 (L) 149 - 420 K/uL    NEUTROPHILS 79 (H) 40 - 73 %    LYMPHOCYTES 6 (L) 21 - 52 %    MONOCYTES 14 (H) 3 - 10 %    EOSINOPHILS 1 0 - 5 %    BASOPHILS 0 0 - 2 %    ABS. NEUTROPHILS 3.6 1.8 - 8.0 K/UL    ABS. LYMPHOCYTES 0.3 (L) 0.9 - 3.6 K/UL    ABS. MONOCYTES 0.6 0.05 - 1.2 K/UL    ABS. EOSINOPHILS 0.0 0.0 - 0.4 K/UL    ABS. BASOPHILS 0.0 0.0 - 0.1 K/UL    DF AUTOMATED      PLATELET COMMENTS LARGE PLATELETS      RBC COMMENTS ANISOCYTOSIS  2+        RBC COMMENTS MICROCYTOSIS  1+        RBC COMMENTS POLYCHROMASIA  1+        RBC COMMENTS        TEARDROP CELLS  OCCASIONAL  SCHISTOCYTES  OCCASIONAL  TARGET CELLS  OCCASIONAL     METABOLIC PANEL, COMPREHENSIVE    Collection Time: 08/27/21  5:00 AM   Result Value Ref Range    Sodium 136 136 - 145 mmol/L    Potassium 7.0 (HH) 3.5 - 5.5 mmol/L    Chloride 111 100 - 111 mmol/L    CO2 9 (LL) 21 - 32 mmol/L    Anion gap 16 3.0 - 18 mmol/L    Glucose 140 (H) 74 - 99 mg/dL     (H) 7.0 - 18 MG/DL    Creatinine 20.30 (H) 0.6 - 1.3 MG/DL    BUN/Creatinine ratio 7 (L) 12 - 20      GFR est AA 3 (L) >60 ml/min/1.73m2    GFR est non-AA 2 (L) >60 ml/min/1.73m2    Calcium 7.0 (L) 8.5 - 10.1 MG/DL    Bilirubin, total 0.5 0.2 - 1.0 MG/DL    ALT (SGPT) 22 16 - 61 U/L    AST (SGOT) 8 (L) 10 - 38 U/L    Alk.  phosphatase 73 45 - 117 U/L    Protein, total 7.6 6.4 - 8.2 g/dL    Albumin 2.8 (L) 3.4 - 5.0 g/dL    Globulin 4.8 (H) 2.0 - 4.0 g/dL    A-G Ratio 0.6 (L) 0.8 - 1.7     MAGNESIUM    Collection Time: 08/27/21  5:00 AM   Result Value Ref Range    Magnesium 3.0 (H) 1.6 - 2.6 mg/dL   PHOSPHORUS    Collection Time: 08/27/21  5:00 AM   Result Value Ref Range    Phosphorus 12.2 (H) 2.5 - 4.9 MG/DL   CARDIAC PANEL,(CK, CKMB & TROPONIN)    Collection Time: 08/27/21  5:00 AM   Result Value Ref Range    CK - MB 3.2 <3.6 ng/ml    CK-MB Index 2.7 0.0 - 4.0 %     39 - 308 U/L    Troponin-I, QT 0.02 0.0 - 0.045 NG/ML   HEP B SURFACE AG    Collection Time: 08/27/21  5:00 AM   Result Value Ref Range    Hepatitis B surface Ag <0.10 <1.00 Index    Hep B surface Ag Interp. Negative NEG     HEP B SURFACE AB    Collection Time: 08/27/21  5:00 AM   Result Value Ref Range    Hepatitis B surface Ab 4.34 (L) >10.0 mIU/mL    Hep B surface Ab Interp. Negative (A) POS      Hep B surface Ab comment        Samples with a  value of 10 mIU/mL or greater are considered positive (protective immunity) in accordance with the CDC guidelines.    GLUCOSE, POC    Collection Time: 08/27/21  6:18 AM   Result Value Ref Range    Glucose (POC) 129 (H) 70 - 110 mg/dL   GLUCOSTABILIZER    Collection Time: 08/27/21  6:20 AM   Result Value Ref Range    Glucose 129 mg/dL    Insulin order 0.0 units/hour    Insulin adminstered 0.0 units/hour    Multiplier 0.000     Low target 140 mg/dL    High target 180 mg/dL    D50 order 0.0 ml    D50 administered 0.00 ml    Minutes until next BG 60 min    Order initials jlm     Administered initials jlm    GLUCOSE, POC    Collection Time: 08/27/21  7:41 AM   Result Value Ref Range    Glucose (POC) 91 70 - 110 mg/dL   GLUCOSTABILIZER    Collection Time: 08/27/21  7:46 AM   Result Value Ref Range    Glucose 91 mg/dL    Insulin order 0.0 units/hour    Insulin adminstered 0.0 units/hour    Multiplier 0.000     Low target 140 mg/dL    High target 180 mg/dL    D50 order 0.0 ml    D50 administered 0.00 ml    Minutes until next BG 60 min    Order initials wa     Administered initials wa    ECHO ADULT COMPLETE    Collection Time: 08/27/21  9:57 AM   Result Value Ref Range    IVSd 1.71 (A) 0.60 - 1.00 cm    LVIDd 4.37 4.20 - 5.90 cm    LVIDs 2.97 cm    LVOT d 2.01 cm    LVPWd 1.43 (A) 0.60 - 1.00 cm    BP EF 60.7 55.0 - 100.0 %    LV Ejection Fraction MOD 2C 64 %    LV Ejection Fraction MOD 4C 60 %    LV ED Vol A2C 125.51 ml    LV ED Vol A4C 167.07 ml    LV ED Vol .17 67.0 - 155.0 ml    LV ES Vol A2C 45.55 ml    LV ES Vol A4C 66.56 ml    LV ES Vol BP 56.26 22.0 - 58.0 ml    LVOT SV 80.0 ml    LVOT Cardiac Output 6.71 l/min    LVOT Peak Gradient 4.66 mmHg    Left Ventricular Outflow Tract Mean Gradient 2.44 mmHg    LVOT SV 64.8 ml    LVOT Peak Velocity 108.00 cm/s    LVOT VTI 20.42 cm    RVIDd 3.17 cm    LA Volume 74.09 18.0 - 58.0 ml    LA Vol 2C 78.65 (A) 18.00 - 58.00 ml    LA Vol 4C 55.20 18.00 - 58.00 ml    AV Annulus 3.08 cm    Aortic Valve Area by Continuity of Peak Velocity 1.99 cm2    Aortic Valve Area by Continuity of VTI 2.20 cm2    AoV PG 11.91 mmHg    Aortic Valve Systolic Mean Gradient 8.07 mmHg    Aortic Valve Systolic Peak Velocity 920.31 cm/s    AoV VTI 29.48 cm    Triscuspid Valve Regurgitation Peak Gradient 30.22 mmHg    TR Max Velocity 275.00 cm/s    AO ASC D 3.12 cm    LV Mass .6 88.0 - 224.0 g    LV Mass AL Index 148.5 49.0 - 115.0 g/m2    IVC proximal 2.02 cm    JOE/BSA Pk Arnoldo 1.0 cm2/m2    JOE/BSA VTI 1.2 WP1/C4   METABOLIC PANEL, BASIC    Collection Time: 08/27/21 10:00 AM   Result Value Ref Range    Sodium 138 136 - 145 mmol/L    Potassium 4.1 3.5 - 5.5 mmol/L    Chloride 106 100 - 111 mmol/L    CO2 19 (L) 21 - 32 mmol/L    Anion gap 13 3.0 - 18 mmol/L    Glucose 81 74 - 99 mg/dL    BUN 79 (H) 7.0 - 18 MG/DL    Creatinine 13.10 (H) 0.6 - 1.3 MG/DL    BUN/Creatinine ratio 6 (L) 12 - 20      GFR est AA 5 (L) >60 ml/min/1.73m2    GFR est non-AA 4 (L) >60 ml/min/1.73m2    Calcium 7.5 (L) 8.5 - 10.1 MG/DL   CARDIAC PANEL,(CK, CKMB & TROPONIN)    Collection Time: 08/27/21 10:00 AM   Result Value Ref Range    CK - MB 2.6 <3.6 ng/ml    CK-MB Index 2.2 0.0 - 4.0 %     39 - 308 U/L    Troponin-I, QT 0.03 0.0 - 0.045 NG/ML   DUPLEX LOWER EXT ARTERY LEFT    Collection Time: 08/27/21  2:46 PM   Result Value Ref Range    Left popliteal dist sys .6 cm/s    Left popliteal prox sys .3 cm/s    Left Dist KYRIE Velocity 114.7 cm/s    Left Dist PTA .7 cm/s    Left mid PTA sys PSV 86.3 cm/s    Left super femoral dist sys .3 cm/s    Left super femoral prox sys .2 cm/s    Left mid peroneal sys PSV 62.6 cm/s       Impression:   Diabetic with cellulitis left heel  Grade 1 Eaton ulcer plantar left heel R/O OM  Status post right BKA    Recommendation:   Continue IV antibiotics as per medicine  X-Ray inconclusive for OM.   Recommend MRI to R/O osteomyelitis plantar left heel  No need for surgical treatment at this time  Wound Care was consulted and saw patient for daily care left heel  Will follow as needed

## 2021-08-27 NOTE — PROGRESS NOTES
0410 Patient arrived to the unit from the ER. Patient appears lethargic, but will answer questions. At this time alert to self and that he is in the hospital. Very tremorous and obvious pruritis. Uremic frost noted on skin. Right subclavian central line noted with hematoma and bleeding, quick clot applied. Left AVF present. Patient has a previous R BKA, prosthetic removed and stump assessed, no wounds present. Left heel ulcer present, cleansed with NS and mepilex applied. Consult placed for wound care. Patient currently on an insulin gtt for hyperkalemia. D10 is also infusing. Dialysis nurse at bedside to complete HD. Crackles noted in tushar lower lobes. O2 sats 100% on RA. Patient bladder scanned w/ >500 mL noted. Olivarez placed for retention. CHG completed. 0645 's. PRN hydralazine given. 0700 Bedside and Verbal shift change report given to JAGJIT Rosenberg (oncoming nurse) by Clare Schulz RN (offgoing nurse).  Report included the following information SBAR, Kardex, Intake/Output, Recent Results and Cardiac Rhythm SB.

## 2021-08-27 NOTE — PROGRESS NOTES
Podiatry:  Patient seen in ICU for treatment of infected diabetic left heel ulcer. He is a poor historian but seems to feel it started a few months ago. He sees Dr. Neelam Yuan but hasn't seen him for a few months. He is status post right BKA. Left plantar heel ulcer has purulent drainage with red granular base. X-Ray is inconclusive for OM. Recommend MRI left heel. No need for surgical treatment at this point. Continue IV abx and Wound Care.   See consult for details

## 2021-08-27 NOTE — PROGRESS NOTES
0345 TRANSFER - IN REPORT:    Verbal report received from NAHEED Frey(name) on Harish Butt.  being received from ER(unit) for routine progression of care      Report consisted of patients Situation, Background, Assessment and   Recommendations(SBAR). Information from the following report(s) SBAR, Kardex, Intake/Output and Recent Results was reviewed with the receiving nurse. Opportunity for questions and clarification was provided. Assessment completed upon patients arrival to unit and care assumed.

## 2021-08-27 NOTE — PROGRESS NOTES
0730-Received pt resting in bed, receiving dialysis, BP elevated, tachycardic around 100BPM, on room air, will continue to monitor  0900-Pt status discussed with intensivist  1030-Pt blood pressure discussed with intensivist being high still post dialysis, plan to restart home medications  1300-Pt cooperative with taking blood pressure medications, plan to go to MRI when ready, screening form complete  1612-Taken to and returned from MRI, tolerated well, no sign of distress  1730-Pt ate dinner, no sign of distress, assisted to bedpan, tolerated well  1735-Large loose bowel movement, Sample sent for C diff testing  1915-Bedside and Verbal shift change report given to Kenn Hawley RN (oncoming nurse) by Victoriano Bassett RN (offgoing nurse).  Report included the following information SBAR, Kardex, Intake/Output, MAR, Recent Results and Cardiac Rhythm SR.

## 2021-08-27 NOTE — DIALYSIS
TREATMENT SUMMARY     Patient dialyzed in room ICU 6  Tolerated tx with hypertension  LUE AVF functioning well without complication of BFR or accessing       2500 ml removed via UF with a with a net removal 2000 ml  Report given to with all questions answered Orvel Situ RN     TREATMENT NOTES                                                                                                ACUTE HEMODIALYSIS FLOW SHEET       PATIENT INFORMATION   [address] [facility]       DR. Cra Cho.    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    Code Status [x]Full Code [] DNR  [] Other_____   Diet: [] Renal [] NPO [x] Diabetic   [] Enteral Feeding [] Cardiac Diabetic: [x]Yes []No     [x]Signed Treatment Consent Verified   [x] Time Out/ Safety Check   PRIMARY NURSE REPORT: FIRST INITIAL/ LAST NAME/TITLE  PRE DIALYSIS:   Parker Dash RN   TIME: 0320   ACCESS   CATHETER ACCESS: [x] N/A  [] RIGHT  [] LEFT  [] IJ  [] SUBCL [] FEM                    [] First use X-ray  [] Tunnel     [] Non-Tunneled      [] No S/S infection  [] Redness [] Drainage  [] Cultured [] Swelling [] Pain                    [] Medical Aseptic [] Prep Dressing Changed                  [] Clotted [] Patent []      Flows: [] Good [] Poor [] Reversed                 If Access Problem Dr. Dayanara Brown: [] Yes [] No    Date:_____  [] N/A[]   GRAFT/FISTULA ACCESS:  [] N/A  [] RIGHT  [x] LEFT  [x] UE   [] LE       [x] AVG  [] AVF [] BUTTONHOLE    [x] +BRUIT/THRILL [x] MEDICAL ASEPTIC PREP     [x] No S/S infection  [] Redness [] Drainage [] Cultured [] Swelling [] Pain              If Access Problem Dr. Juan Manuel Ruiz: [] Yes [] No    Date:______ [] N/A   GENERAL ASSESSMENT   LUNGS:  SaO2% __99__ [x] Clear [] Coarse [] Crackles [] Wheezing               [] Diminished Location: [] RLL [] LLL [] RUL [] KYLAH    COUGH:  [] Productive  [] Loose[] Dry [x] N/A  RESPIRATIONS: [x] Easy [] Labored    THERAPY: [x] RA   [] NC _____. L/min    Mask: [] NRB [] Venti  _____O2%                  [] Ventilator [] Intubated [] Trach [] BiPap [] CPap [] HI Flow   CARDIAC: [x] Regular [] Irregular [] Pericardial Rub [] JVD               Monitored Rhythm:__SINUS TACH____ [] N/A   EDEMA: [] None [x] Generalized [] Facial [] Pedal [] UE [] LE             [] Pitting [x] 1 [] 2 [] 3 [] 4    [] Right [] Left [] Bilateral   SKIN:    [x] Warm [] Hot [] Cold  [x] Dry [] Pale [] Diaphoretic              [] Flushed [] Jaundiced [] Cyanotic [] Rash [] Weeping     LOC:    [x] Alert  Oriented to: [x] Person [x] Place [x] Time             [] Confused [] Lethargic [] Medicated [] Non-responsive    GI/ABDOMEN: [] Flat [] Distended [x] Soft [] Firm [] Diarrhea [x] Bowel Sounds Present [] Nausea [] Vomiting    PAIN: [x] 0 [] 1 [] 2 [] 3 [] 4 [] 5 [] 6 [] 7 [] 8 [] 9 [] 10          Scale 1-10 Action/Follow Up_____   MOBILITY: [] Amb [] Amb/Assist [x] Bed  [] Wheelchair    CURRENT LABS   HBsAg ONLY: Date Drawn:             [] Negative [] Positive [x] Unknown. HBsAb: Date Drawn:              [] Susceptible <10 [] Immune ? 10 [x] Unknown   Date of Current Labs:  ATTACHED     EDUCATION   Person Educated: [x] Patient [] Other_________   Knowledge base: [] None [x] Minimal [] Substantial    Barriers to learning  [x] None _______________   Preferred method of learning: [] Written [x] Oral [x] Visual [] Hands on    Topic: [x] Access Care [] S&S of infection [x] Fluid Management   [] K+  [x] Procedural  [] Albumin [] Medications [] Tx Options   [] Transplant [] Diet [] Other    Teaching Tools: [x] Explain [x] Demonstration [] Handout_____ [] Video______  CARE PLAN    [x] Renal Failure (Adult)  Interdisciplinary  · Fluid and electrolytes stabilized  ? Interventions  · Dehydration signs and symptoms (eg: Weight/lab monitoring; vomiting/diarrhea/urine; tenting; mucous membranes; dizziness/lethargy/irritability/confusion; weak pulse; tachycardia; blood pressure; I&O)  · Fluid overload signs and symptoms assessment (eg: Body weight increased; dyspnea; edema; hypertension; respiratory crackles/wheezing; JVD; lab monitoring; mental status changes; I&O)  · Monitor appropriate lab values  · COMPLIANCE WITH PRESCRIBED THERAPY  · ARTERIAL ACCESS SITE ASSESSMENT  · NUTRITION SCREENING  · Vital signs monitoring per assessed patient condition or unit standard  · Cardiac monitoring  · Hydration management  · Intake and output measurement  · Body weight monitoring  · Skin care  · DIALYSIS  · Nutrition Care Process per nutrition screen  · Oral hygiene care every 2 hours  · Pain management     · Outcome   ? [x] Progressing Towards Goal  ? [] Not Progressing Towards Goals  ? [] Goals Met/Resolved  ? [] Goals Not Met/ Resolved        · Patient/ Family Education  ?  Progressing Towards Goals          RO/HEMODIAYLSIS MACHINE SAFETY CHECKS- BEFORE EACH TREATMENT          [] [facility]: Machine Serial #1:  [id number]   RO Serial #1: [id number]                       [x] [facility]: Machine Serial #2:  [id number]    Serial #2: [id number]        [] [facility]: Machine Serial #3:  [id number]     Serial #8:[id number]    Alarm Test: [x] Pass  Time___0420_____  [x] RO/Machine Log Complete    [x] Extracorporeal circuit Tested for integrity           Dialyzer__[id number]________   Tubing_20I23-11__    Dialysate: pH__7.4_  Temp.__37___Conductivity: Meter __14__ HD Machine__13.8_   CHLORINE TESTING- BEFORE EACH TREATMENT AND EVERY 4 HOURS   Total Chlorine: [x] Less than 0.1 ppm Time:__0445___2nd Check Time:______  (If greater than 0.1 ppm from Primary then every 30 minutes from Secondary)   TREATMENT INIATION-WITH DIALYSIS PRECAUTIONS   [x] All Connections Secured   [x] Saline Line Double Clamped    [x] Venous Parameters Set [x] Arterial Parameters Set    [x] Prime Given 250 ml     [x] Air Foam Detector Engaged   PRE-TREATMENT   UF Calculations: Wt to lose:_2000___ml(+) Oral:_0_ml(+)IV Meds/Fluids/Blood prods_0__ml(+) Prime/Rinse__500_ml(=)Total UF Goal__2500__mL   Scale Type:[x] Bed scale [] Sling Scale [] Wheel Chair Scale []  Not Ordered [] [] Unable to obtain pt on stretcher/ bed scale malfunctioning   Tx Initiation Note: RECEIVED REPORT. PATIENT ALERT AND ORIENTED. NAD. ALL QUESTIONS ANSWERED WITH PATIENT  SAFETY CHECKS COMPLETE. TIME OUT COMPLETE. TREATMENT INITIATED VIA _LUE AVG____. NO CONCERNS NOTED. [x] Time Out/Safety Check  Time:_0455   INTRADIALYTIC MONITORING  (SEE ATTACHED FLOWSHEET)     POST TREATMENT    Time Medication Dose Volume Route    Initials                                           [facility] Signatures Title Initials Time   STEPHEN ZHOU RN PAP                Dialyzer cleared: [x] Good [] Fair [] Poor     Blood Processed _38.4__Liters    Net UF Removed __2000__mL  Post Tx Access:                  AVF/AVG: Bleeding Stop       Art._8__min Chaim.__8_min [x]+bruit/thrill                Catheter: Locking Solution [] Heparin 1 ml/1000 units  [] Normal Saline                                                                               Art._____ ml Chaim._____ml  Post Assessment:              Skin: [x]Warm [x]Dry []Diaphoretic []Flushed [] Pale []Cyanotic            Lungs: [x]Clear []Coarse []Crackles []Wheezing             Cardiac: [x]Regular []Irregular  []Monitored rhythm_SINUS TACH___ []N/A            Edema: [x]None []General []Facial []Pedal  []UE []LE []RIGHT []LEFT            Pain: [x]0 []1 []2 []3 []4 []5 []6 []7 []8 []9 []10   POST Tx Note:TREATMENT COMPLETED. ALL POSSIBLE BLOOD RETURNED. PT TOLERATED WELL. VITAL SIGNS WNL  FOR PATIENT. NAD.   DE CANULATED ARTERIAL AND VENOUS SITES WITHOUT INCIDENT. REPORT GIVEN WITH OPPORTUNITY TO ADDRESS ANY CONCERNS OR QUESTTIONS AND PATIENT STAYS ROOM IN BED WITHOUT DISTRESS OR ACUTE DISCOMFORT. BED LOWED, CALL BELL WITHIN REACH .      Primary Nurse Report: First initial/Last name/Title    Post Dialysis:_Jake Simon RN___         Time:__0800___    Abbreviations: AVG-arterial venous graft, AVF-arterial venous fistula, IJ-Internal Jugular,  Subcl-Subclavian, Fem-Femoral, Tx-treatment, AP/HR-apical heart rate, DFR-dialysate flow rate, BFR-blood flow rate, AP-arterial pressure, -venous pressure, UF-ultrafiltrate, TMP-transmembrane pressure, Chaim-Venous, Art-Arterial, RO-Reverse Osmosis

## 2021-08-27 NOTE — WOUND CARE
IP WOUND [address] MEDICAL RECORD NUMBER:  [id number]  AGE: 54 y.o. GENDER: male  : 1970  TODAY'S DATE:  2021    GENERAL     [] Follow-up   [x] New Consult     Sarah Beth Johnson. is a 54 y.o. male referred by:   [x] Physician  [x] Nursing  [] Other:         PAST MEDICAL HISTORY    Past Medical History:   Diagnosis Date    Chronic kidney disease     Diabetes [facility]     Heart failure ([facility] [facility]     Hypertension     Sickle cell trait ([facility] [facility]         PAST SURGICAL HISTORY    Past Surgical History:   Procedure Laterality Date    HX ORTHOPAEDIC      right BKA        FAMILY HISTORY    No family history on file. SOCIAL HISTORY    Social History     Tobacco Use    Smoking status: Former Smoker    Smokeless tobacco: Never Used   Substance Use Topics    Alcohol use: No    Drug use: No       ALLERGIES    Allergies   Allergen Reactions    Penicillins Rash    Vancomycin Other (comments)     kayla syndrome       MEDICATIONS    No current facility-administered medications on file prior to encounter. Current Outpatient Medications on File Prior to Encounter   Medication Sig Dispense Refill    fidaxomicin (DIFICID) 200 mg tab tablet Take 1 Tab by mouth two (2) times a day. 14 Tab 0    doxazosin (CARDURA) 4 mg tablet Take 2 mg by mouth daily.  sodium hypochlorite 0.0125% (DAKINS SOLUTION) Apply 1 mL to affected area two (2) times a week.  omeprazole (PRILOSEC) 40 mg capsule Take 40 mg by mouth daily.  sevelamer carbonate (RENVELA) 800 mg tab tab Take 800 mg by mouth three (3) times daily (with meals).  sodium bicarbonate 650 mg tablet Take 650 mg by mouth three (3) times daily.  cefazolin sodium/dextrose,iso (CEFAZOLIN IN DEXTROSE, ISO-OS,) 2 gram/50 mL pgbk 50 mL by IntraVENous route every Monday. Mwf, 2-2-3 g after HD 50 mL 0    amLODIPine (NORVASC) 10 mg tablet Take 1 Tab by mouth daily.  30 Tab 0    carvedilol (COREG) 25 mg tablet Take 1 Tab by mouth two (2) times daily (with meals). 60 Tab 0    hydrALAZINE (APRESOLINE) 25 mg tablet Take 1 Tab by mouth three (3) times daily. 90 Tab 0    isosorbide mononitrate ER (IMDUR) 60 mg CR tablet Take 1 Tab by mouth daily. 30 Tab 0    furosemide (LASIX) 80 mg tablet Take 1 Tab by mouth two (2) times a day. 60 Tab 0       Wt Readings from Last 3 Encounters:   08/27/21 78.9 kg (174 lb)   08/20/21 79 kg (174 lb 2.6 oz)   06/03/21 81 kg (178 lb 9.2 oz)       [unfilled]  Visit Vitals  BP (!) 165/74   Pulse 100   Temp 99.1 °F (37.3 °C)   Resp 15   Ht 5' 7\" (1.702 m)   Wt 78.9 kg (174 lb)   SpO2 95%   BMI 27.25 kg/m²       ASSESSMENT     Skin impairment Identification:   Type: diabetic    Contributing Factors: diabetes, poor glucose control and chronic pressure    Wound Heel Left (Active)   Number of days: 575       Wound Foot Left;Posterior 02/17/20 (Active)   Number of days: 557       Wound Heel Left;Posterior 08/27/21 (Active)   Number of days: 0       [REMOVED] Wound Heel Left round [place] diabetic ulcer noted  01/29/20 (Removed)   Number of days: 40          PLAN     Skin Care & Pressure Relief Recommendations  N/A    Physician/Provider notified: Yes  Recommendations: keep mepilex border on wound to keep covered clean and dry.     Teaching completed with:   [x] Patient           [] Family member       [] Caregiver          [] Nursing  [] Other    Patient/Caregiver Teaching:  Level of patient/caregiver understanding able to:   [x] Indicates understanding       [] Needs reinforcement  [] Unsuccessful      [] Verbal Understanding  [] Demonstrated understanding       [] No evidence of learning  [] Refused teaching         [] N/A       Electronically signed by Miriam Fish RN on 8/27/2021 at 3:24 PM

## 2021-08-27 NOTE — PROGRESS NOTES
Pharmacy Dosing Services: [address]    Pharmacist Renal Dosing Progress Note for    Physician Dr. Goodman Gravely    The following medication: Levaquin was automatically dose-adjusted per [facility] P&T Committee Protocol, with respect to renal function. Consult provided for this   54 y.o. , male , for the indication of SSTI. Pt Weight:   Wt Readings from Last 1 Encounters:   08/26/21 78.9 kg (174 lb)         Previous Regimen Levaquin 750 mg IV q48h   Serum Creatinine Lab Results   Component Value Date/Time    Creatinine 20.60 (H) 08/26/2021 07:50 PM       Creatinine Clearance Estimated Creatinine Clearance: 4.1 mL/min (A) (based on SCr of 20.6 mg/dL (H)). BUN Lab Results   Component Value Date/Time     (H) 08/26/2021 07:50 PM       Dosage changed to:  Levaquin 500 mg IV q48h    Additional notes:  Loading dose was Levaquin 750 mg IV once. Dialysis pt    Pharmacy to continue to monitor patient daily. Will make dosage adjustments based upon changing renal function.   Alondra Barnes Dr PharmD Contact information:  [phone]

## 2021-08-27 NOTE — CONSULTS
[facility]ists  Pulmonary, Critical Care, and Sleep Medicine    Name: Trey Crenshaw MRN: [id number]   : 1970 Hospital: [facility] [place]    Date: 2021  Room: [address] [place] [address] Note                                              Consult requesting physician: Dr. Lorna Olson  Reason for Consult: Hypoglycemia, hypokalemia      IMPRESSION:   Hypoglycemia associated with diabetes ([address] E11.649     Hyperkalemia E87.5     Diarrhea R19.7     ESRD (end stage renal disease) on dialysis ([address] N18.6, Z99.2 ·     ·   Patient Active Problem List   Diagnosis Code    Hyponatremia E87.1    Anemia of chronic disease D63.8    Acute on chronic renal insufficiency N28.9, N18.9    Acute exacerbation of CHF (congestive heart failure) ([facility]) I50.9    Iron deficiency anemia D50.9    Other restrictive cardiomyopathy ([facility] I42.5    Dehydration E86.0    ESRD (end stage renal disease) on dialysis ([address] N18.6, Z99.2    Cellulitis and abscess of foot L03.119, L02.619    HTN (hypertension) I10    Acute osteomyelitis ([address] M86.10    Hx of BKA, right ([address] Z89.511    Esophagitis K20.90    Hypoglycemia E16.2    Hypertensive urgency I16.0    Abdominal pain with vomiting R10.9, R11.10    Diarrhea R19.7    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    Hyperkalemia E87.5    Hypertensive crisis I16.9    Uremia N19    Hypoglycemia associated with diabetes ([facility] E11.649    Non-compliance with renal dialysis ([facility] Z91.15 ·       · Code status: Full code      RECOMMENDATIONS:   Respiratory:   No acute issues. Protecting airway. On room air resting. CXR 2021: Right subclavian in place. Cardiomegaly with central vascular congestion. Keep SPO2 >=92%. HOB 30 degree elevation all the time. Aggressive pulmonary toileting. Aspiration precautions. Incentive spirometry. CVS: Hx HTN, CHF.   Blood pressure elevated 190s; restart outpatient medication including Norvasc 10 mg daily, hydralazine 25 mg 3 times daily, Imdur 60 mg daily, Coreg 25 mg twice daily. Echo pending. ID: Chronic left foot osteomyelitis involving the calcaneus. X-ray left foot 8/27/2021: Cellulitis and ulceration involving the left hindfoot; calcaneus chronic osteomyelitis. MRI of foot pending. Left foot wound culture 8/26/2021: Rare GNR. Blood culture 8/26/2021: NGTD. Antibiotics: Continue cefepime, Levaquin. Allergy to vancomycin, PCN. Hx C Diff colitis in 12/2020. C. difficile toxin 8/20/2021: Indeterminate. C. difficile 8/27/2021 pending collection as patient does not have any loose bowel movement since hospitalization. Discussed with RN to sign stool for C. difficile as soon as patient has any loose bowel movement. Wound care consulted. COVID-19 8/27/2021: Pending. On droplet and entry precautions. Follow cultures. Deescalate antibiotic when appropriate. Hematology/Oncology: Hx sickle cell trait. Chronic anemia and thrombocytopenia. Some bleeding at right subclavian central line site; under dressing. Hold SC heparin for now. Renal: Hx ESRD on HD; making some urine. Admitted with severe hyperkalemia after missing hemodialysis. S/p urgent hemodialysis 8/27/2021 with 1.5 L removed. Urine output 1 L since Olivarez placed in ER. DC Olivarez catheter as soon as nephrologist agrees and does not need strict I's and O's. Repeat labs after dialysis pending. Nephrologist on board. GI/: No acute issues. Endocrine: Monitor BS. SSI. DC insulin drip which was started for hyperkalemia. Patient had hypoglycemia on admission likely due to poor p.o. intake; resolved. Start taking p.o. diet. Neurology: Alert awake. Nonfocal.  CT head and cervical spine on admission: Nil acute. Pain/Sedation: No acute issues. Skin/Wound: Local dressing at left sole/foot ulcer; wound care consulted. Electrolytes: Replace electrolytes per ICU electrolyte replacement protocol.    IVF: DC D10 since insulin drip is being discontinued and hypoglycemia has resolved. Nutrition: P.o. cardiac, renal, diabetic diet. Prophylaxis: DVT Prophylaxis: SCD/hold Heparin SC due to mild bleeding at right subclavian central line site. GI Prophylaxis: Protonix. Restraints: none  Lines/Tubes: PIV  Central line: Right subclavian central line placed in ER 8/27/2021 (site examined, no erythema, induration, discharge or sign of infection. Dressing intact. Medically necessary, will remove it when not needed. Central line bundle followed). Olivarez: 8/26/2021 placed in ER (Medically necessary for strict input/output monitoring in critically ill patient, will remove it when not needed. Olivarez bundle followed). PT/OT eval and treat. OOB. Advance Directive/Palliative Care: Per clinical course. Will defer respective systems problem management to primary and other respective consultant and follow patient in ICU with primary and other medical team.  Further recommendations will be based on the patient's response to recommended treatment and results of the investigation ordered. Quality Care: PPI, DVT prophylaxis, HOB elevated, Infection control all reviewed and addressed. Care of plan d/w RN, RT, MDR, hospitalist team.  D/w patient (answered all questions to satisfaction). High complexity decision making was performed during the evaluation of this patient at high risk for decompensation with multiple organ involvement. Total critical care time spent rendering care exclusive of procedures/family discussion/coordination of care: 40 minutes. Subjective/History of Present Illness:     Patient is a 54 y.o. male with PMHx significant for HTN, DM, CHF, sickle cell trait, s/p right BKA, ESRD on HD, medication noncompliance  Admitted with hypoglycemia, hyperkalemia, chronic diarrhea. Status post urgent hemodialysis 8/27/2021 early morning. Due to difficult getting IV access, he had left subclavian central line placed in ER.     COVID-19 vaccine status: Vaccinated. 8/27/2021:  Seen in ICU room 106. Hypoglycemia resolved on D5.  Just completed hemodialysis today morning with 1.5 L removed. Olivarez catheter was placed in ER; had 1 L urine output. No fever or leukocytosis. Hypertensive. Will restart outpatient antihypertensive medications. Alert awake and oriented. Denies dyspnea, cough, chest pain. Chronic left foot ulcer. Patient was not able to eat home due to poor appetite; appetite has improved and now is asking for food. Denies any nausea, vomiting, chest pain, dyspnea. Stated that he has the sore on the left sole but no discharge noted. I/O last 24 hrs: Intake/Output Summary (Last 24 hours) at 8/27/2021 1027  Last data filed at 8/27/2021 0745  Gross per 24 hour   Intake 1000 ml   Output 2000 ml   Net -1000 ml         The patient is critically ill     History taken from patient, EMR     Review of systems:   HEENT: No epistaxis, no nasal drainage, no difficulty in swallowing, no redness in eyes  Respiratory: as above  Cardiovascular: no chest pain, no palpitations, no chronic leg edema, no syncope  Gastrointestinal: no abd pain, no vomiting, no bleeding symptoms  Genitourinary: No urinary symptoms or hematuria  Musculoskeletal: Neg  Neurological: No focal weakness, no seizures, no headaches  Behvioral/Psych: No anxiety, no depression  Constitutional: No fever, no chills, no weight loss, no night sweats     Allergies   Allergen Reactions    Penicillins Rash    Vancomycin Other (comments)     kayla syndrome      Past Medical History:   Diagnosis Date    Chronic kidney disease     Diabetes ([facility]     Heart failure ([facility]     Hypertension     Sickle cell trait ([facility]       Past Surgical History:   Procedure Laterality Date    HX ORTHOPAEDIC      right BKA 2017      Social History     Tobacco Use    Smoking status: Former Smoker    Smokeless tobacco: Never Used   Substance Use Topics    Alcohol use: No      No family history on file. Prior to Admission medications    Medication Sig Start Date End Date Taking? Authorizing Provider   fidaxomicin (DIFICID) 200 mg tab tablet Take 1 Tab by mouth two (2) times a day. 12/24/20   Moo Caruso MD   doxazosin (CARDURA) 4 mg tablet Take 2 mg by mouth daily. Provider, Historical   sodium hypochlorite 0.0125% (DAKINS SOLUTION) Apply 1 mL to affected area two (2) times a week. Provider, Historical   omeprazole (PRILOSEC) 40 mg capsule Take 40 mg by mouth daily. Provider, Historical   sevelamer carbonate (RENVELA) 800 mg tab tab Take 800 mg by mouth three (3) times daily (with meals). Provider, Historical   sodium bicarbonate 650 mg tablet Take 650 mg by mouth three (3) times daily. Provider, Historical   cefazolin sodium/dextrose,iso (CEFAZOLIN IN DEXTROSE, ISO-OS,) 2 gram/50 mL pgbk 50 mL by IntraVENous route every Monday. Mwf, 2-2-3 g after HD 2/5/20   Aicha Swan MD   amLODIPine (NORVASC) 10 mg tablet Take 1 Tab by mouth daily. 3/8/18   Aicha Swan MD   carvedilol (COREG) 25 mg tablet Take 1 Tab by mouth two (2) times daily (with meals). 3/7/18   Aicha Swan MD   hydrALAZINE (APRESOLINE) 25 mg tablet Take 1 Tab by mouth three (3) times daily. 3/7/18   Aicha Swan MD   isosorbide mononitrate ER (IMDUR) 60 mg CR tablet Take 1 Tab by mouth daily. 3/8/18   Aicha Swan MD   furosemide (LASIX) 80 mg tablet Take 1 Tab by mouth two (2) times a day.  3/7/18   Aicha Swan MD     Current Facility-Administered Medications   Medication Dose Route Frequency    sodium chloride (NS) flush 5-40 mL  5-40 mL IntraVENous Q8H    [Held by provider] heparin (porcine) injection 5,000 Units  5,000 Units SubCUTAneous Q8H    pantoprazole (PROTONIX) injection 40 mg  40 mg IntraVENous Q24H    cefepime (MAXIPIME) 1 g in sterile water (preservative free) 10 mL IV syringe  1 g IntraVENous Q8H    [START ON 8/29/2021] levoFLOXacin (LEVAQUIN) 500 mg in D5W IVPB  500 mg IntraVENous Q48H    sodium chloride (NS) flush 5-40 mL  5-40 mL IntraVENous Q8H    sodium polystyrene (KAYEXALATE) 15 gram/60 mL oral suspension 15 g  15 g Oral NOW    insulin lispro (HUMALOG) injection   SubCUTAneous Q4H         Objective:   Vital Signs:    Visit Vitals  BP (!) 192/77   Pulse (!) 104   Temp 98.6 °F (37 °C)   Resp 20   Ht 5' 7\" (1.702 m)   Wt 78.9 kg (174 lb)   SpO2 99%   BMI 27.25 kg/m²       O2 Device: None (Room air)       Temp (24hrs), Av.2 °F (36.8 °C), Min:97.5 °F (36.4 °C), Max:98.6 °F (37 °C)       Intake/Output:   Last shift:      701 - 1900  In: -   Out:      Last 3 shifts: 1901 - 700  In: 1000 [I.V.:1000]  Out: -       Intake/Output Summary (Last 24 hours) at 2021 1027  Last data filed at 2021 0745  Gross per 24 hour   Intake 1000 ml   Output 2000 ml   Net -1000 ml       Last 3 Recorded Weights in this Encounter    21 1836 21 0923   Weight: 78.9 kg (174 lb) 78.9 kg (174 lb)           Physical Exam:   General/Neurology: Alert, awake. NAD. Head:   NCAT. Eye:   EOM intact. No icterus/pallor/cyanosis. Nose:   No nasal drainage/discharge. Neck:   Trachea midline. Lung: Moderate air entry bilateral equal.  No rales, rhonchi. No wheezing or stridor. No prolonged expiration or accessory muscle use. Heart:   S1 S2 present. No murmur or JVD. Abdomen:  Soft. NT. ND. No palpable masses. Extremities:  Right BKA. No left leg edema. Left foot ulcer on the heel sole region without pus discharge. Pulses: 2+ and symmetric in DP in left foot. Data:       Recent Results (from the past 24 hour(s))   GLUCOSE, POC    Collection Time: 21  6:34 PM   Result Value Ref Range    Glucose (POC) 71 70 - 110 mg/dL   CULTURE, WOUND W GRAM STAIN    Collection Time: 21  7:30 PM    Specimen: Foot;  Wound   Result Value Ref Range    Special Requests: NO SPECIAL REQUESTS      GRAM STAIN RARE WBCS SEEN      GRAM STAIN RARE GRAM POSITIVE RODS      Culture result: PENDING    GLUCOSE, POC    Collection Time: 08/26/21  7:33 PM   Result Value Ref Range    Glucose (POC) 125 (H) 70 - 110 mg/dL   CBC WITH AUTOMATED DIFF    Collection Time: 08/26/21  7:50 PM   Result Value Ref Range    WBC 3.4 (L) 4.6 - 13.2 K/uL    RBC 4.57 4.35 - 5.65 M/uL    HGB 11.6 (L) 13.0 - 16.0 g/dL    HCT 35.8 (L) 36.0 - 48.0 %    MCV 78.3 78.0 - 100.0 FL    MCH 25.4 24.0 - 34.0 PG    MCHC 32.4 31.0 - 37.0 g/dL    RDW 20.3 (H) 11.6 - 14.5 %    PLATELET 151 (L) 604 - 420 K/uL    NEUTROPHILS 83 (H) 40 - 73 %    LYMPHOCYTES 8 (L) 21 - 52 %    MONOCYTES 9 3 - 10 %    EOSINOPHILS 0 0 - 5 %    BASOPHILS 0 0 - 2 %    ABS. NEUTROPHILS 2.8 1.8 - 8.0 K/UL    ABS. LYMPHOCYTES 0.3 (L) 0.9 - 3.6 K/UL    ABS. MONOCYTES 0.3 0.05 - 1.2 K/UL    ABS. EOSINOPHILS 0.0 0.0 - 0.4 K/UL    ABS. BASOPHILS 0.0 0.0 - 0.1 K/UL    DF AUTOMATED      RBC COMMENTS ANISOCYTOSIS  1+       METABOLIC PANEL, COMPREHENSIVE    Collection Time: 08/26/21  7:50 PM   Result Value Ref Range    Sodium 137 136 - 145 mmol/L    Potassium 7.1 (HH) 3.5 - 5.5 mmol/L    Chloride 108 100 - 111 mmol/L    CO2 8 (LL) 21 - 32 mmol/L    Anion gap 21 (H) 3.0 - 18 mmol/L    Glucose 129 (H) 74 - 99 mg/dL     (H) 7.0 - 18 MG/DL    Creatinine 20.60 (H) 0.6 - 1.3 MG/DL    BUN/Creatinine ratio 7 (L) 12 - 20      GFR est AA 3 (L) >60 ml/min/1.73m2    GFR est non-AA 2 (L) >60 ml/min/1.73m2    Calcium 7.3 (L) 8.5 - 10.1 MG/DL    Bilirubin, total 0.5 0.2 - 1.0 MG/DL    ALT (SGPT) 26 16 - 61 U/L    AST (SGOT) 8 (L) 10 - 38 U/L    Alk.  phosphatase 84 45 - 117 U/L    Protein, total 8.4 (H) 6.4 - 8.2 g/dL    Albumin 3.3 (L) 3.4 - 5.0 g/dL    Globulin 5.1 (H) 2.0 - 4.0 g/dL    A-G Ratio 0.6 (L) 0.8 - 1.7     CARDIAC PANEL,(CK, CKMB & TROPONIN)    Collection Time: 08/26/21  7:50 PM   Result Value Ref Range    CK - MB 4.0 (H) <3.6 ng/ml    CK-MB Index 3.0 0.0 - 4.0 %     39 - 308 U/L    Troponin-I, QT 0.02 0.0 - 0.045 NG/ML   MAGNESIUM    Collection Time: 08/26/21  7:50 PM   Result Value Ref Range Magnesium 3.1 (H) 1.6 - 2.6 mg/dL   PHOSPHORUS    Collection Time: 08/26/21  7:50 PM   Result Value Ref Range    Phosphorus 13.2 (H) 2.5 - 4.9 MG/DL   HEMOGLOBIN A1C WITH EAG    Collection Time: 08/26/21  7:50 PM   Result Value Ref Range    Hemoglobin A1c 4.7 4.2 - 5.6 %    Est. average glucose 88 mg/dL   EKG, 12 LEAD, INITIAL    Collection Time: 08/26/21  7:59 PM   Result Value Ref Range    Ventricular Rate 98 BPM    Atrial Rate 98 BPM    P-R Interval 172 ms    QRS Duration 92 ms    Q-T Interval 392 ms    QTC Calculation (Bezet) 500 ms    Calculated P Axis 77 degrees    Calculated R Axis -11 degrees    Calculated T Axis 58 degrees    Diagnosis       Normal sinus rhythm  Prolonged QT  Abnormal ECG  When compared with ECG of 20-AUG-2021 13:59,  Questionable change in QRS axis  Nonspecific T wave abnormality no longer evident in Inferior leads     CULTURE, BLOOD    Collection Time: 08/27/21 12:30 AM    Specimen: Blood   Result Value Ref Range    Special Requests: NO SPECIAL REQUESTS      Culture result: NO GROWTH AFTER 9 HOURS     CULTURE, BLOOD    Collection Time: 08/27/21 12:45 AM    Specimen: Blood   Result Value Ref Range    Special Requests: NO SPECIAL REQUESTS      Culture result: NO GROWTH AFTER 9 HOURS     POC LACTIC ACID    Collection Time: 08/27/21 12:47 AM   Result Value Ref Range    Lactic Acid (POC) 0.76 0.40 - 2.00 mmol/L   POC VENOUS BLOOD GAS    Collection Time: 08/27/21 12:54 AM   Result Value Ref Range    pH, venous (POC) 7.22 (L) 7.32 - 7.42      pCO2, venous (POC) 20.1 (L) 41 - 51 MMHG    pO2, venous (POC) 76 (H) 25 - 40 mmHg    HCO3, venous (POC) 8.2 (L) 23.0 - 28.0 MMOL/L    sO2, venous (POC) 92.6 (H) 65 - 88 %    Base deficit, venous (POC) 17.7 mmol/L    Specimen type (POC) VENOUS BLOOD      Performed by Elijah Cordero ([employer])     Critical value read back CASA    GLUCOSE, POC    Collection Time: 08/27/21  1:49 AM   Result Value Ref Range    Glucose (POC) 112 (H) 70 - 110 mg/dL GLUCOSTABILIZER    Collection Time: 08/27/21  1:53 AM   Result Value Ref Range    Glucose 112 mg/dL    Insulin order 1.0 units/hour    Insulin adminstered 1.0 units/hour    Multiplier 0.020     Low target 140 mg/dL    High target 180 mg/dL    D50 order 0.0 ml    D50 administered 0.00 ml    Minutes until next BG 60 min    Order initials      Administered initials     GLUCOSE, POC    Collection Time: 08/27/21  3:03 AM   Result Value Ref Range    Glucose (POC) 124 (H) 70 - 110 mg/dL   GLUCOSTABILIZER    Collection Time: 08/27/21  3:04 AM   Result Value Ref Range    Glucose 124 mg/dL    Insulin order 0.6 units/hour    Insulin adminstered 0.6 units/hour    Multiplier 0.010     Low target 140 mg/dL    High target 180 mg/dL    D50 order 0.0 ml    D50 administered 0.00 ml    Minutes until next BG 60 min    Order initials      Administered initials     GLUCOSE, POC    Collection Time: 08/27/21  4:38 AM   Result Value Ref Range    Glucose (POC) 140 (H) 70 - 110 mg/dL   GLUCOSTABILIZER    Collection Time: 08/27/21  4:38 AM   Result Value Ref Range    Glucose 140 mg/dL    Insulin order 0.8 units/hour    Insulin adminstered 0.8 units/hour    Multiplier 0.010     Low target 140 mg/dL    High target 180 mg/dL    D50 order 0.0 ml    D50 administered 0.00 ml    Minutes until next BG 60 min    Order initials jl     Administered initials jlm    LACTIC ACID    Collection Time: 08/27/21  5:00 AM   Result Value Ref Range    Lactic acid 1.1 0.4 - 2.0 MMOL/L   CBC WITH AUTOMATED DIFF    Collection Time: 08/27/21  5:00 AM   Result Value Ref Range    WBC 4.5 (L) 4.6 - 13.2 K/uL    RBC 3.83 (L) 4.35 - 5.65 M/uL    HGB 9.8 (L) 13.0 - 16.0 g/dL    HCT 29.2 (L) 36.0 - 48.0 %    MCV 76.2 (L) 78.0 - 100.0 FL    MCH 25.6 24.0 - 34.0 PG    MCHC 33.6 31.0 - 37.0 g/dL    RDW 19.8 (H) 11.6 - 14.5 %    PLATELET 97 (L) 232 - 420 K/uL    NEUTROPHILS 79 (H) 40 - 73 %    LYMPHOCYTES 6 (L) 21 - 52 %    MONOCYTES 14 (H) 3 - 10 %    EOSINOPHILS 1 0 - 5 %    BASOPHILS 0 0 - 2 %    ABS. NEUTROPHILS 3.6 1.8 - 8.0 K/UL    ABS. LYMPHOCYTES 0.3 (L) 0.9 - 3.6 K/UL    ABS. MONOCYTES 0.6 0.05 - 1.2 K/UL    ABS. EOSINOPHILS 0.0 0.0 - 0.4 K/UL    ABS. BASOPHILS 0.0 0.0 - 0.1 K/UL    DF AUTOMATED      PLATELET COMMENTS LARGE PLATELETS      RBC COMMENTS ANISOCYTOSIS  2+        RBC COMMENTS MICROCYTOSIS  1+        RBC COMMENTS POLYCHROMASIA  1+        RBC COMMENTS        TEARDROP CELLS  OCCASIONAL  SCHISTOCYTES  OCCASIONAL  TARGET CELLS  OCCASIONAL     METABOLIC PANEL, COMPREHENSIVE    Collection Time: 08/27/21  5:00 AM   Result Value Ref Range    Sodium 136 136 - 145 mmol/L    Potassium 7.0 (HH) 3.5 - 5.5 mmol/L    Chloride 111 100 - 111 mmol/L    CO2 9 (LL) 21 - 32 mmol/L    Anion gap 16 3.0 - 18 mmol/L    Glucose 140 (H) 74 - 99 mg/dL     (H) 7.0 - 18 MG/DL    Creatinine 20.30 (H) 0.6 - 1.3 MG/DL    BUN/Creatinine ratio 7 (L) 12 - 20      GFR est AA 3 (L) >60 ml/min/1.73m2    GFR est non-AA 2 (L) >60 ml/min/1.73m2    Calcium 7.0 (L) 8.5 - 10.1 MG/DL    Bilirubin, total 0.5 0.2 - 1.0 MG/DL    ALT (SGPT) 22 16 - 61 U/L    AST (SGOT) 8 (L) 10 - 38 U/L    Alk.  phosphatase 73 45 - 117 U/L    Protein, total 7.6 6.4 - 8.2 g/dL    Albumin 2.8 (L) 3.4 - 5.0 g/dL    Globulin 4.8 (H) 2.0 - 4.0 g/dL    A-G Ratio 0.6 (L) 0.8 - 1.7     MAGNESIUM    Collection Time: 08/27/21  5:00 AM   Result Value Ref Range    Magnesium 3.0 (H) 1.6 - 2.6 mg/dL   PHOSPHORUS    Collection Time: 08/27/21  5:00 AM   Result Value Ref Range    Phosphorus 12.2 (H) 2.5 - 4.9 MG/DL   CARDIAC PANEL,(CK, CKMB & TROPONIN)    Collection Time: 08/27/21  5:00 AM   Result Value Ref Range    CK - MB 3.2 <3.6 ng/ml    CK-MB Index 2.7 0.0 - 4.0 %     39 - 308 U/L    Troponin-I, QT 0.02 0.0 - 0.045 NG/ML   GLUCOSE, POC    Collection Time: 08/27/21  6:18 AM   Result Value Ref Range    Glucose (POC) 129 (H) 70 - 110 mg/dL   GLUCOSTABILIZER    Collection Time: 08/27/21  6:20 AM   Result Value Ref Range Glucose 129 mg/dL    Insulin order 0.0 units/hour    Insulin adminstered 0.0 units/hour    Multiplier 0.000     Low target 140 mg/dL    High target 180 mg/dL    D50 order 0.0 ml    D50 administered 0.00 ml    Minutes until next BG 60 min    Order initials jlm     Administered initials jlm    GLUCOSE, POC    Collection Time: 08/27/21  7:41 AM   Result Value Ref Range    Glucose (POC) 91 70 - 110 mg/dL   GLUCOSTABILIZER    Collection Time: 08/27/21  7:46 AM   Result Value Ref Range    Glucose 91 mg/dL    Insulin order 0.0 units/hour    Insulin adminstered 0.0 units/hour    Multiplier 0.000     Low target 140 mg/dL    High target 180 mg/dL    D50 order 0.0 ml    D50 administered 0.00 ml    Minutes until next BG 60 min    Order initials wa     Administered initials wa          Chemistry Recent Labs     08/27/21  0500 08/26/21  1950   * 129*    137   K 7.0* 7.1*    108   CO2 9* 8*   * 135*   CREA 20.30* 20.60*   CA 7.0* 7.3*   MG 3.0* 3.1*   PHOS 12.2* 13.2*   AGAP 16 21*   BUCR 7* 7*   AP 73 84   TP 7.6 8.4*   ALB 2.8* 3.3*   GLOB 4.8* 5.1*   AGRAT 0.6* 0.6*        Lactic Acid Lactic acid   Date Value Ref Range Status   08/27/2021 1.1 0.4 - 2.0 MMOL/L Final     Recent Labs     08/27/21  0500   LAC 1.1        Liver Enzymes Protein, total   Date Value Ref Range Status   08/27/2021 7.6 6.4 - 8.2 g/dL Final     Albumin   Date Value Ref Range Status   08/27/2021 2.8 (L) 3.4 - 5.0 g/dL Final     Globulin   Date Value Ref Range Status   08/27/2021 4.8 (H) 2.0 - 4.0 g/dL Final     A-G Ratio   Date Value Ref Range Status   08/27/2021 0.6 (L) 0.8 - 1.7   Final     Alk.  phosphatase   Date Value Ref Range Status   08/27/2021 73 45 - 117 U/L Final     Recent Labs     08/27/21  0500 08/26/21  1950   TP 7.6 8.4*   ALB 2.8* 3.3*   GLOB 4.8* 5.1*   AGRAT 0.6* 0.6*   AP 73 84        CBC w/Diff Recent Labs     08/27/21  0500 08/26/21  1950   WBC 4.5* 3.4*   RBC 3.83* 4.57   HGB 9.8* 11.6*   HCT 29.2* 35.8*   PLT 97* 121*   GRANS 79* 83*   LYMPH 6* 8*   EOS 1 0        Cardiac Enzymes Lab Results   Component Value Date/Time     08/27/2021 05:00 AM     08/26/2021 07:50 PM    CKMB 3.2 08/27/2021 05:00 AM    CKMB 4.0 (H) 08/26/2021 07:50 PM    CKND1 2.7 08/27/2021 05:00 AM    CKND1 3.0 08/26/2021 07:50 PM    TROIQ 0.02 08/27/2021 05:00 AM    TROIQ 0.02 08/26/2021 07:50 PM        BNP No results found for: BNP, BNPP, XBNPT     Coagulation No results for input(s): PTP, INR, APTT, INREXT in the last 72 hours. Thyroid  No results found for: T4, T3U, TSH, TSHEXT    No results found for: T4     Urinalysis Lab Results   Component Value Date/Time    Color YELLOW 03/02/2018 05:41 AM    Appearance CLEAR 03/02/2018 05:41 AM    Specific gravity 1.010 03/02/2018 05:41 AM    pH (UA) 5.0 03/02/2018 05:41 AM    Protein 300 (A) 03/02/2018 05:41 AM    Glucose NEGATIVE  03/02/2018 05:41 AM    Ketone NEGATIVE  03/02/2018 05:41 AM    Bilirubin NEGATIVE  03/02/2018 05:41 AM    Urobilinogen 0.2 03/02/2018 05:41 AM    Nitrites NEGATIVE  03/02/2018 05:41 AM    Leukocyte Esterase NEGATIVE  03/02/2018 05:41 AM    Epithelial cells FEW 03/02/2018 05:41 AM    Bacteria FEW (A) 03/02/2018 05:41 AM    WBC 0 to 2 03/02/2018 05:41 AM    RBC NEGATIVE  03/02/2018 05:41 AM        Micro  Recent Labs     08/27/21  0045 08/27/21  0030 08/26/21  1930   CULT NO GROWTH AFTER 9 HOURS NO GROWTH AFTER 9 HOURS PENDING     Recent Labs     08/27/21  0045 08/27/21  0030 08/26/21  1930   CULT NO GROWTH AFTER 9 HOURS NO GROWTH AFTER 9 HOURS PENDING          Culture data during this hospitalization.    All Micro Results     Procedure Component Value Units Date/Time    CULTURE, BLOOD [[id number]] Collected: 08/27/21 0030    Order Status: Completed Specimen: Blood Updated: 08/27/21 1012     Special Requests: NO SPECIAL REQUESTS        Culture result: NO GROWTH AFTER 9 HOURS       CULTURE, BLOOD [[id number]] Collected: 08/27/21 0045    Order Status: Completed Specimen: Blood Updated: 08/27/21 1012     Special Requests: NO SPECIAL REQUESTS        Culture result: NO GROWTH AFTER 9 HOURS       C. DIFFICILE AG & TOXIN A/B [[id number]]     Order Status: Sent Specimen: Stool     ENTERIC BACTERIA PANEL, DNA [[id number]]     Order Status: Sent Specimen: Stool     C. DIFFICILE AG & TOXIN A/B [[id number]]     Order Status: Canceled Specimen: Stool     RESPIRATORY VIRUS PANEL W/COVID-19, PCR [[id number]] Collected: 08/26/21 2200    Order Status: Completed Specimen: NASOPHARYNGEAL SWAB Updated: 08/27/21 0117    CULTURE, Lattie Prayer STAIN [[id number]] Collected: 08/26/21 1930    Order Status: Completed Specimen: Wound from Foot Updated: 08/26/21 2321     Special Requests: NO SPECIAL REQUESTS        GRAM STAIN RARE WBCS SEEN         RARE GRAM POSITIVE RODS        Culture result: PENDING    CULTURE, URINE [[id number]]     Order Status: Sent Specimen: Clean catch          CT head 8/26/21:  1. No CT findings of an acute intracranial abnormality. Please note that  noncontrast head CT may be normal in early acute infarct. CT cervical spine 8/26/21:  No fracture or dislocation. CXR 8/27/2021:  1. Adequate right subclavian central venous catheter placement with no  postprocedural complication. 2. Cardiomegaly with central vascular congestion and interstitial pulmonary  edema, unchanged. X-ray left foot 8/27/2021:  Cellulitis and ulceration involving left hindfoot with findings suggestive of  chronic osteomyelitis involving the calcaneus. No radiographic evidence of  active osteomyelitis within the left foot. ·Please note: Voice-recognition software may have been used to generate this report, which may have resulted in some phonetic-based errors in grammar and contents. Even though attempts were made to correct all the mistakes, some may have been missed, and remained in the body of the document.       Ann Weaver MD  8/27/2021

## 2021-08-27 NOTE — ED NOTES
Bleeding continues to central line. Dr. Colt Palacios made aware. At bedside to reinforce dressing. Patient vomited large amount of emesis.

## 2021-08-27 NOTE — PROGRESS NOTES
Reason for Admission:   AMS                    RUR Score:     27             PCP: First and Last name:   Steev Kiran MD     Name of Practice:    Are you a current patient: Yes/No:    Approximate date of last visit:    Can you participate in a virtual visit if needed:     Do you (patient/family) have any concerns for transition/discharge? Plan for utilizing home health:  TBD     Current Advanced Directive/Advance Care Plan:  Full Code      Healthcare Decision Maker:   Click here to complete [employer] including selection of the Healthcare Decision Maker Relationship (ie \"Primary\")              Transition of Care Plan:   Transition of care undetermined at this time, pt will remain over weekend with weekend cm following and available for immediate needs thru [facility] . Care Management Interventions  PCP Verified by CM: Yes  Palliative Care Criteria Met (RRAT>21 & CHF Dx)?: No  Current Support Network:  Other  Confirm Follow Up Transport: Family

## 2021-08-28 LAB
ALBUMIN SERPL-MCNC: 2.5 G/DL (ref 3.4–5)
ALBUMIN/GLOB SERPL: 0.5 {RATIO} (ref 0.8–1.7)
ALP SERPL-CCNC: 73 U/L (ref 45–117)
ALT SERPL-CCNC: 18 U/L (ref 16–61)
ANION GAP SERPL CALC-SCNC: 15 MMOL/L (ref 3–18)
AST SERPL-CCNC: 8 U/L (ref 10–38)
BASOPHILS # BLD: 0 K/UL (ref 0–0.1)
BASOPHILS NFR BLD: 0 % (ref 0–2)
BILIRUB SERPL-MCNC: 0.6 MG/DL (ref 0.2–1)
BUN SERPL-MCNC: 95 MG/DL (ref 7–18)
BUN/CREAT SERPL: 6 (ref 12–20)
C DIFF GDH STL QL: POSITIVE
C DIFF MOLECULAR, CDTTNP: NEGATIVE
C DIFF TOX A+B STL QL IA: NEGATIVE
CALCIUM SERPL-MCNC: 6.6 MG/DL (ref 8.5–10.1)
CALCIUM SERPL-MCNC: 6.9 MG/DL (ref 8.5–10.1)
CAMPYLOBACTER SPECIES, DNA: NEGATIVE
CHLORIDE SERPL-SCNC: 107 MMOL/L (ref 100–111)
CO2 SERPL-SCNC: 17 MMOL/L (ref 21–32)
CREAT SERPL-MCNC: 15.6 MG/DL (ref 0.6–1.3)
DIFFERENTIAL METHOD BLD: ABNORMAL
ENTEROTOXIGEN E COLI, DNA: NEGATIVE
EOSINOPHIL # BLD: 0.2 K/UL (ref 0–0.4)
EOSINOPHIL NFR BLD: 4 % (ref 0–5)
ERYTHROCYTE [DISTWIDTH] IN BLOOD BY AUTOMATED COUNT: 19.6 % (ref 11.6–14.5)
GLOBULIN SER CALC-MCNC: 4.7 G/DL (ref 2–4)
GLUCOSE BLD STRIP.AUTO-MCNC: 129 MG/DL (ref 70–110)
GLUCOSE BLD STRIP.AUTO-MCNC: 81 MG/DL (ref 70–110)
GLUCOSE BLD STRIP.AUTO-MCNC: 84 MG/DL (ref 70–110)
GLUCOSE BLD STRIP.AUTO-MCNC: 88 MG/DL (ref 70–110)
GLUCOSE BLD STRIP.AUTO-MCNC: 90 MG/DL (ref 70–110)
GLUCOSE BLD STRIP.AUTO-MCNC: 95 MG/DL (ref 70–110)
GLUCOSE SERPL-MCNC: 80 MG/DL (ref 74–99)
HCT VFR BLD AUTO: 30.8 % (ref 36–48)
HGB BLD-MCNC: 10.5 G/DL (ref 13–16)
INTERPRETATION: ABNORMAL
IRON SATN MFR SERPL: 84 % (ref 20–50)
IRON SERPL-MCNC: 87 UG/DL (ref 50–175)
LYMPHOCYTES # BLD: 0.7 K/UL (ref 0.9–3.6)
LYMPHOCYTES NFR BLD: 16 % (ref 21–52)
MAGNESIUM SERPL-MCNC: 2.6 MG/DL (ref 1.6–2.6)
MCH RBC QN AUTO: 26.2 PG (ref 24–34)
MCHC RBC AUTO-ENTMCNC: 34.1 G/DL (ref 31–37)
MCV RBC AUTO: 76.8 FL (ref 78–100)
MONOCYTES # BLD: 1 K/UL (ref 0.05–1.2)
MONOCYTES NFR BLD: 20 % (ref 3–10)
NEUTS SEG # BLD: 2.8 K/UL (ref 1.8–8)
NEUTS SEG NFR BLD: 60 % (ref 40–73)
P SHIGELLOIDES DNA STL QL NAA+PROBE: NEGATIVE
PCR REFLEX: ABNORMAL
PHOSPHATE SERPL-MCNC: 8.1 MG/DL (ref 2.5–4.9)
PLATELET # BLD AUTO: 119 K/UL (ref 135–420)
POTASSIUM SERPL-SCNC: 5.2 MMOL/L (ref 3.5–5.5)
PROT SERPL-MCNC: 7.2 G/DL (ref 6.4–8.2)
PTH-INTACT SERPL-MCNC: 1078.3 PG/ML (ref 18.4–88)
RBC # BLD AUTO: 4.01 M/UL (ref 4.35–5.65)
SALMONELLA SPECIES, DNA: NEGATIVE
SHIGA TOXIN PRODUCING, DNA: NEGATIVE
SHIGELLA SP+EIEC IPAH STL QL NAA+PROBE: NEGATIVE
SODIUM SERPL-SCNC: 139 MMOL/L (ref 136–145)
TIBC SERPL-MCNC: 104 UG/DL (ref 250–450)
TSH SERPL DL<=0.05 MIU/L-ACNC: 1.65 UIU/ML (ref 0.36–3.74)
VIBRIO SPECIES, DNA: NEGATIVE
WBC # BLD AUTO: 4.7 K/UL (ref 4.6–13.2)
Y. ENTEROCOLITICA, DNA: NEGATIVE

## 2021-08-28 PROCEDURE — 36415 COLL VENOUS BLD VENIPUNCTURE: CPT

## 2021-08-28 PROCEDURE — 83735 ASSAY OF MAGNESIUM: CPT

## 2021-08-28 PROCEDURE — 84443 ASSAY THYROID STIM HORMONE: CPT

## 2021-08-28 PROCEDURE — C9113 INJ PANTOPRAZOLE SODIUM, VIA: HCPCS | Performed by: INTERNAL MEDICINE

## 2021-08-28 PROCEDURE — 74011000250 HC RX REV CODE- 250: Performed by: INTERNAL MEDICINE

## 2021-08-28 PROCEDURE — 82962 GLUCOSE BLOOD TEST: CPT

## 2021-08-28 PROCEDURE — 90935 HEMODIALYSIS ONE EVALUATION: CPT

## 2021-08-28 PROCEDURE — 74011250637 HC RX REV CODE- 250/637: Performed by: STUDENT IN AN ORGANIZED HEALTH CARE EDUCATION/TRAINING PROGRAM

## 2021-08-28 PROCEDURE — 83970 ASSAY OF PARATHORMONE: CPT

## 2021-08-28 PROCEDURE — 83540 ASSAY OF IRON: CPT

## 2021-08-28 PROCEDURE — 85025 COMPLETE CBC W/AUTO DIFF WBC: CPT

## 2021-08-28 PROCEDURE — 84100 ASSAY OF PHOSPHORUS: CPT

## 2021-08-28 PROCEDURE — 80053 COMPREHEN METABOLIC PANEL: CPT

## 2021-08-28 PROCEDURE — 65660000000 HC RM CCU STEPDOWN

## 2021-08-28 PROCEDURE — 74011250637 HC RX REV CODE- 250/637: Performed by: INTERNAL MEDICINE

## 2021-08-28 PROCEDURE — 2709999900 HC NON-CHARGEABLE SUPPLY

## 2021-08-28 PROCEDURE — 74011250636 HC RX REV CODE- 250/636: Performed by: STUDENT IN AN ORGANIZED HEALTH CARE EDUCATION/TRAINING PROGRAM

## 2021-08-28 PROCEDURE — 74011250636 HC RX REV CODE- 250/636: Performed by: INTERNAL MEDICINE

## 2021-08-28 RX ORDER — PARICALCITOL 5 UG/ML
4 INJECTION, SOLUTION INTRAVENOUS
Status: DISCONTINUED | OUTPATIENT
Start: 2021-08-28 | End: 2021-09-04

## 2021-08-28 RX ADMIN — Medication 10 ML: at 05:41

## 2021-08-28 RX ADMIN — CARVEDILOL 25 MG: 12.5 TABLET, FILM COATED ORAL at 20:25

## 2021-08-28 RX ADMIN — HYDRALAZINE HYDROCHLORIDE 50 MG: 50 TABLET, FILM COATED ORAL at 23:17

## 2021-08-28 RX ADMIN — CARVEDILOL 25 MG: 12.5 TABLET, FILM COATED ORAL at 09:42

## 2021-08-28 RX ADMIN — WATER 1 G: 1 INJECTION INTRAMUSCULAR; INTRAVENOUS; SUBCUTANEOUS at 20:24

## 2021-08-28 RX ADMIN — ISOSORBIDE MONONITRATE 60 MG: 60 TABLET, EXTENDED RELEASE ORAL at 09:42

## 2021-08-28 RX ADMIN — PANTOPRAZOLE SODIUM 40 MG: 40 INJECTION, POWDER, FOR SOLUTION INTRAVENOUS at 02:42

## 2021-08-28 RX ADMIN — AMLODIPINE BESYLATE 10 MG: 5 TABLET ORAL at 09:42

## 2021-08-28 RX ADMIN — ACETAMINOPHEN 650 MG: 325 TABLET ORAL at 02:42

## 2021-08-28 RX ADMIN — PARICALCITOL 4 MCG: 5 INJECTION, SOLUTION INTRAVENOUS at 20:25

## 2021-08-28 RX ADMIN — Medication 10 ML: at 22:00

## 2021-08-28 RX ADMIN — Medication 10 ML: at 15:46

## 2021-08-28 RX ADMIN — SEVELAMER CARBONATE 1600 MG: 800 TABLET, FILM COATED ORAL at 20:25

## 2021-08-28 RX ADMIN — WATER 1 G: 1 INJECTION INTRAMUSCULAR; INTRAVENOUS; SUBCUTANEOUS at 02:42

## 2021-08-28 NOTE — PROGRESS NOTES
RENAL CONSULT FOLLOW UP NOTE   2021    Patient:  Som Yi. :  1970  Gender:  male  MRN #:  [id number]    Reason for Consult: Management of Hyperkalemia and Severe metabolic acidosis in setting of ESRD requested by ED physician Dr Irena Valdes     Assessment:  Som Yi. is a 54y.o. year old male  With h/o ESRD on HD (MWF) , Uncontrolled HTN, DM, CHF with EF 40 % , right BKA status , chronic diarrhea and abdominal discomfort was brought by EMS due to unresponsiveness. He was found to have hypoglycemia requiring dextrose drip initially . He is non compliant with dialysis session and medications . Mostly does not take anti hypertensive meds and comes with uncontrolled HTN in dialysis unit . He had missed dialysis more than 1 week due to not feeling well and diarrhea . He was found to have Hypertensive urgency ,  hyperkalemia, significant metabolic acidosis and pulmonary vascular congestion in CXR  Admitted in ICU for further care  On insulin , dextrose drip for Hyperkalemia     # Hyperkalemia - Improved  # Metabolic Acidosis   # Pulmonary edema- Improved   # Uncontrolled HTN - Stable          Plan:      # ESRD-  He has short run of dialysis yesterday. Patient examined and labs reviewed . Dialysis today for 3.5 hour.  - next dialysis on Monday   - At present he is breathing fine on room air  - Intake and output recording   - Dose all meds for ESRD    # Metabolic Acidosis - Due to missed dialysis , should improve after HD today      # Chronic Diarrhea :- Has h/o C.diff infection in the past .  He has genuine problems of chronic diarrhea for many months. Have seen him going to bathroom every 10 minutes in dialysis unit He mostly complaints of abdominal discomfort which is the reason why he said he miss dialysis . On : stool showed- Indeterminate for Toxigenic C.  Difficile  His symptoms could be due to chronic C.diff infection , may benefit from chronic suppressive therapy or any other intervention   - consider ID consult this admission for this   - Also he mostly complaints of abdominal discomfort and has significant anemia mostly .  Not sure if he has chronic GI bleed from colitis   - consider CT abdomen and pelvis once he is more stable prior to discharge and GI consult if he needs colonoscopy due to clinical symptoms of colitis     # Hypertension: - still above target range but improving .   - continue coreg 25 mg BID , amlodipine 10 mg , Hydralazine 50 mg TID and Imdur 60 mg daily   - with those meds if BP remained high should resume Losartan and minoxidil also which he takes at home  - Hydralazine iv prn as ordered     # Anemia: Hemoglobin is below target range   Not iron deficient    EVIE during dialysis   Iron profile     # BMD- Hypocalcemia   Phos  is high- continue  sevelamer 1600 mg TID   Secondary Hyperparathyroidism, Elevated PTH- willl start Zemplar 4 mcg during HD , first dose today     Rest of the medical management per primary team       Subjective:   He still complaints of diarrhea  - No SOB, abdominal pain, nausea and vomiting   - Bp stable   Feels better than yesterday       Intake/Output Summary (Last 24 hours) at 8/28/2021 1320  Last data filed at 8/27/2021 2000  Gross per 24 hour   Intake 0 ml   Output 400 ml   Net -400 ml         Objective:    Visit Vitals  BP (!) 148/74   Pulse 86   Temp 98.3 °F (36.8 °C)   Resp 18   Ht 5' 7\" (1.702 m)   Wt 78.9 kg (174 lb)   SpO2 99%   BMI 27.25 kg/m²       Physical Exam:    Pt awake  Lung: clear to auscultation    Ext: RT- BKA status , left - no edema, heel ulcer/infection    Laboratory Data:    Lab Results   Component Value Date    BUN 95 (H) 08/28/2021    BUN 79 (H) 08/27/2021     (H) 08/27/2021     08/28/2021     08/27/2021     08/27/2021    CO2 17 (L) 08/28/2021    CO2 19 (L) 08/27/2021    CO2 9 (LL) 08/27/2021     Lab Results   Component Value Date    WBC 4.7 08/28/2021    HGB 10.5 (L) 08/28/2021 HCT 30.8 (L) 08/28/2021     No components found for: CALCIUM, PHOSPHORUS, MAGNESIUM  No results found for: HDL  No results found for: JACOB Tong    Imaging Reveiwed:    CXR 8/27/21- 1. Adequate right subclavian central venous catheter placement with no  postprocedural complication. 2. Cardiomegaly with central vascular congestion and interstitial pulmonary  edema, unchanged.        Marti Teixeira MD   [facility].- Nephrology

## 2021-08-28 NOTE — PROGRESS NOTES
1930- Report and care received, assessment completed per flow sheet. Alert, oriented, soft spoken. NAD.     2300- Reassessment without change. 0300- Reassessment without change. 8520- Report given to receiving RN, transferred to [facility] via bed with cardiac monitor in place.

## 2021-08-28 NOTE — PROGRESS NOTES
Problem: Chronic Renal Failure  Goal: *Fluid and electrolytes stabilized  8/28/2021 1547 by Juventino Herrera RN  Outcome: Progressing Towards Goal  8/28/2021 1547 by Juventino Herrera RN  Outcome: Progressing Towards Goal     Problem: Chronic Renal Failure  Goal: *Fluid and electrolytes stabilized  8/28/2021 1547 by Juventino Herrera RN  Outcome: Progressing Towards Goal     Problem: Patient Education: Go to Patient Education Activity  Goal: Patient/Family Education  8/28/2021 1547 by Juventino Herrera RN  Outcome: Progressing Towards Goal  8/28/2021 1547 by Juventino Herrera RN  Outcome: Progressing Towards Goal

## 2021-08-28 NOTE — PROGRESS NOTES
Patient transferred out of ICU. No acute pulmonary issues. PCCM will sign off. Please call us with any questions.      Gregory Mancilla MD 8/28/2021 7:47 AM

## 2021-08-28 NOTE — PROGRESS NOTES
Hospitalist Progress Note    Patient: Halie France MRN: [id number]  CSN: [id number]    YOB: 1970  Age: 54 y.o. Sex: male    DOA: 8/26/2021 LOS:  LOS: 1 day                Assessment and Plan:    Principal Problem:    Hyperkalemia (8/27/2021)    Active Problems:    Cellulitis and abscess of foot (1/29/2020)      Hypertensive crisis (8/27/2021)      Uremia (8/27/2021)      Hypoglycemia associated with diabetes ([facility] [address] (8/27/2021)      Non-compliance with renal dialysis ([facility] [address] (8/27/2021)        Diabetes: numbers look better     Hyperkalemia/ARF/CKD: S/P emergent dialysis. Will get more today      HTN: some of the outpatient meds started yesterday    Blood pressure is better      Chronic diarrhea:  Stool  was indeterminate for cdiff and is likely made worse by current antibiotics  Repeat is sent     Heel ulcer:  Seen by podiatry . They recommended a MRI and cefipime        Chief complaint:  Low blood sugar and hypertension       Subjective:    No issues right now      Review of systems:    General: No fevers or chills. Cardiovascular: No chest pain or pressure. No palpitations. Pulmonary: No shortness of breath. Gastrointestinal: No nausea, vomiting. Objective:    Vital signs/Intake and Output:  Visit Vitals  BP (!) 148/74   Pulse 86   Temp 98.3 °F (36.8 °C)   Resp 18   Ht 5' 7\" (1.702 m)   Wt 78.9 kg (174 lb)   SpO2 99%   BMI 27.25 kg/m²     Current Shift:  No intake/output data recorded. Last three shifts:  08/26 1901 - 08/28 0700  In: 1010 [I.V.:1010]  Out: 2400 [Urine:400]    Physical Exam:  General: NAD, AAOx3. Non-toxic. HEENT: NC/AT. PERRLA, EOMI.  MMM. Lungs: Nml inspection. CTA B/L. No wheezing, rales or rhonchi. Heart:  S1S2 RRR,  PMI mid 5th IC space. No M/RG. Abdomen: Soft, NT/ND.  BS+. No peritoneal signs. Extremities: No C/C/E. Psych:   Nml affect. Neurologic:  2-12 intact. Strength 5/5 throughout.   Sensation symmetrical.          Labs: Results:       Chemistry Recent Labs     08/28/21  0703 08/27/21  1000 08/27/21  0500 08/26/21  1950 08/26/21  1950   GLU 80 81 140*   < > 129*    138 136   < > 137   K 5.2 4.1 7.0*   < > 7.1*    106 111   < > 108   CO2 17* 19* 9*   < > 8*   BUN 95* 79* 133*   < > 135*   CREA 15.60* 13.10* 20.30*   < > 20.60*   CA 6.6* 7.5* 7.0*   < > 7.3*   AGAP 15 13 16   < > 21*   BUCR 6* 6* 7*   < > 7*   AP 73  --  73  --  84   TP 7.2  --  7.6  --  8.4*   ALB 2.5*  --  2.8*  --  3.3*   GLOB 4.7*  --  4.8*  --  5.1*   AGRAT 0.5*  --  0.6*  --  0.6*    < > = values in this interval not displayed. CBC w/Diff Recent Labs     08/28/21  0703 08/27/21  0500 08/26/21  1950   WBC 4.7 4.5* 3.4*   RBC 4.01* 3.83* 4.57   HGB 10.5* 9.8* 11.6*   HCT 30.8* 29.2* 35.8*   * 97* 121*   GRANS 60 79* 83*   LYMPH 16* 6* 8*   EOS 4 1 0      Cardiac Enzymes Recent Labs     08/27/21  1617 08/27/21  1000    117   CKND1 1.7 2.2      Coagulation No results for input(s): PTP, INR, APTT, INREXT in the last 72 hours. Lipid Panel No results found for: CHOL, CHOLPOCT, CHOLX, CHLST, CHOLV, 976611, HDL, HDLP, LDL, LDLC, DLDLP, 526347, VLDLC, VLDL, TGLX, TRIGL, TRIGP, TGLPOCT, CHHD, CHHDX   BNP No results for input(s): BNPP in the last 72 hours.    Liver Enzymes Recent Labs     08/28/21  0703   TP 7.2   ALB 2.5*   AP 73      Thyroid Studies Lab Results   Component Value Date/Time    TSH 1.65 08/28/2021 07:03 AM        Procedures/imaging: see electronic medical records for all procedures/Xrays and details which were not copied into this note but were reviewed prior to creation of Plan

## 2021-08-28 NOTE — PROGRESS NOTES
0715 Bedside and Verbal shift change report given to VIOLETA Lara RN (oncoming nurse) by COLTON Julian RN (offgoing nurse). Report included the following information SBAR, Kardex, Intake/Output, and MAR.     0941 Pt assessed. No signs of acute distress. Pt in bed.    1212 Pt assessed. No signs of acute distress. Pt in bed.    1230 Pt starting dialysis. medications held. 1652 Bedside and Verbal shift change report given to VIOLETA Angel RN (oncoming nurse) by [facility]. Terrence Lara RN (offgoing nurse). Report included the following information SBAR, Kardex, Intake/Output, and MAR. Pt in bed, call light in reach.

## 2021-08-28 NOTE — PROGRESS NOTES
2729 The patient arrived via bed from the ICU to [facility]. He is alert, oriented and denies distress of any kind. Oriented him to the room, call bell and unit routines. Tele box #12 on reading SR. No immediate needs. 8131 Bladder scan showed 13cc.

## 2021-08-28 NOTE — DIALYSIS
TREATMENT SUMMARY     Patient dialyzed in room 343  Tolerated tx with increased restlessness   LUE AVF functioning with difficulty r/t to pt frequent movements. Ector Troncoso ie placing arms under back, placing arms behind head, flexing arm, laying on left side/left shoulder and resting his body weight on elbows. Patient movement caused highly frequent alarms and stoppages during HD. Patient excessive movement cause two venous infiltrations and patient needed to be re cannulated twice. Patient educated and reminded frequently about safety, mechanices and HD compliance.  - 400    3187 ml removed via UF with a with a net removal 2687 ml  Report given to with all questions answered Tushar Bryant RN     TREATMENT NOTES                                                                                                ACUTE HEMODIALYSIS FLOW SHEET       PATIENT INFORMATION   [address]       DR. Carole Garcia.    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    Code Status [x]Full Code [] DNR  [] Other_____   Diet: [] Renal [] NPO [x] Diabetic   [] Enteral Feeding [] Cardiac Diabetic: [x]Yes []No     [x]Signed Treatment Consent Verified   [x] Time Out/ Safety Check   PRIMARY NURSE REPORT: FIRST INITIAL/ LAST NAME/TITLE  PRE DIALYSIS:    Tushar Bryant RN TIME: 1130   ACCESS   CATHETER ACCESS: [x] N/A  [] RIGHT  [] LEFT  [] IJ  [] SUBCL [] FEM                    [] First use X-ray  [] Tunnel     [] Non-Tunneled      [] No S/S infection  [] Redness [] Drainage  [] Cultured [] Swelling [] Pain [] Medical Aseptic [] Prep Dressing Changed                  [] Clotted [] Patent []      Flows: [] Good [] Poor [] Reversed                 If Access Problem Dr. Mike Gottlieb: [] Yes [] No    Date:_____  [] N/A[]   GRAFT/FISTULA ACCESS:  [] N/A  [] RIGHT  [x] LEFT  [x] UE   [] LE       [x] AVG  [] AVF [] BUTTONHOLE    [x] +BRUIT/THRILL [x] MEDICAL ASEPTIC PREP     [x] No S/S infection  [] Redness [] Drainage  [] Cultured [] Swelling [] Pain              If Access Problem Dr. Mike Gottlieb: [] Yes [] No    Date:______ [] N/A   GENERAL ASSESSMENT   LUNGS:  SaO2% __99__ [x] Clear [] Coarse [] Crackles [] Wheezing               [] Diminished Location: [] RLL [] LLL [] RUL [] KYLAH    COUGH:  [] Productive  [] Loose[] Dry [x] N/A  RESPIRATIONS: [x] Easy [] Labored    THERAPY: [x] RA   [] NC _____. L/min    Mask: [] NRB [] Venti  _____O2%                  [] Ventilator [] Intubated [] Trach [] BiPap [] CPap [] HI Flow   CARDIAC: [x] Regular [] Irregular [] Pericardial Rub [] JVD               Monitored Rhythm:__SINUS TACH____ [] N/A   EDEMA: [] None [x] Generalized [] Facial [] Pedal [] UE [] LE             [] Pitting [x] 1 [] 2 [] 3 [] 4    [] Right [] Left [] Bilateral   SKIN:    [x] Warm [] Hot [] Cold  [x] Dry [] Pale [] Diaphoretic              [] Flushed [] Jaundiced [] Cyanotic [] Rash [] Weeping     LOC:    [x] Alert  Oriented to: [x] Person [x] Place [] Time             [x] Confused [] Lethargic [] Medicated [] Non-responsive    GI/ABDOMEN: [] Flat [] Distended [x] Soft [] Firm [] Diarrhea [x] Bowel Sounds Present [] Nausea [] Vomiting    PAIN: [x] 0 [] 1 [] 2 [] 3 [] 4 [] 5 [] 6 [] 7 [] 8 [] 9 [] 10          Scale 1-10 Action/Follow Up_____   MOBILITY: [] Amb [] Amb/Assist [x] Bed  [] Wheelchair    CURRENT LABS   HBsAg ONLY: Date Drawn:  8/27/21           [x] Negative [] Positive [] Unknown.      HBsAb: Date Drawn:   8/27/21           [x] Susceptible <10 [] Immune ?10 [] Unknown   Date of Current Labs:  ATTACHED EDUCATION   Person Educated: [x] Patient [] Other_________   Knowledge base: [] None [x] Minimal [] Substantial    Barriers to learning  [x] None _______________   Preferred method of learning: [] Written [x] Oral [x] Visual [] Hands on    Topic: [x] Access Care [] S&S of infection [x] Fluid Management   [] K+  [x] Procedural  [] Albumin [] Medications [] Tx Options   [] Transplant [] Diet [] Other    Teaching Tools: [x] Explain [x] Demonstration [] Handout_____ [] Video______  CARE PLAN    [x] Renal Failure (Adult)  Interdisciplinary  · Fluid and electrolytes stabilized  ? Interventions  · Dehydration signs and symptoms (eg: Weight/lab monitoring; vomiting/diarrhea/urine; tenting; mucous membranes; dizziness/lethargy/irritability/confusion; weak pulse; tachycardia; blood pressure; I&O)  · Fluid overload signs and symptoms assessment (eg: Body weight increased; dyspnea; edema; hypertension; respiratory crackles/wheezing; JVD; lab monitoring; mental status changes; I&O)  · Monitor appropriate lab values  · COMPLIANCE WITH PRESCRIBED THERAPY  · ARTERIAL ACCESS SITE ASSESSMENT  · NUTRITION SCREENING  · Vital signs monitoring per assessed patient condition or unit standard  · Cardiac monitoring  · Hydration management  · Intake and output measurement  · Body weight monitoring  · Skin care  · DIALYSIS  · Nutrition Care Process per nutrition screen  · Oral hygiene care every 2 hours  · Pain management     · Outcome   ? [x] Progressing Towards Goal  ? [] Not Progressing Towards Goals  ? [] Goals Met/Resolved  ? [] Goals Not Met/ Resolved        · Patient/ Family Education  ?  Progressing Towards Goals          RO/HEMODIAYLSIS MACHINE SAFETY CHECKS- BEFORE EACH TREATMENT          [] [facility]: Machine Serial #1:  [id number]   RO Serial #1: [id number]                       [x] [facility]: Machine Serial #2:  [id number]   RO Serial #2: [id number]        [] [facility]: Machine Serial #3:  [id number]    RO Serial #1:[id number]    Alarm Test: [x] Pass Time__1215____  [x] RO/Machine Log Complete    [x] Extracorporeal circuit Tested for integrity           Dialyzer__[id number]________   Tubing_20I23-11__    Dialysate: pH__7.4_  Temp.__37___Conductivity: Meter __14__ HD Machine__13.8_   CHLORINE TESTING- BEFORE EACH TREATMENT AND EVERY 4 HOURS   Total Chlorine: [x] Less than 0.1 ppm Time:__1215___2nd Check Time:____  (If greater than 0.1 ppm from Primary then every 30 minutes from Secondary)   TREATMENT INIATION-WITH DIALYSIS PRECAUTIONS   [x] All Connections Secured   [x] Saline Line Double Clamped    [x] Venous Parameters Set [x] Arterial Parameters Set    [x] Prime Given 250 ml     [x] Air Foam Detector Engaged   PRE-TREATMENT   UF Calculations: Wt to lose:_3187___ml(+) Oral:_0_ml(+)IV Meds/Fluids/Blood prods_0__ml(+) Prime/Rinse__500_ml(=)Total UF Goal__2687__mL   Scale Type:[x] Bed scale [] Sling Scale [] Wheel Chair Scale []  Not Ordered [] [] Unable to obtain pt on stretcher/ bed scale malfunctioning   Tx Initiation Note: RECEIVED REPORT. PATIENT ALERT AND ORIENTED. NAD. ALL QUESTIONS ANSWERED WITH PATIENT  SAFETY CHECKS COMPLETE. TIME OUT COMPLETE. TREATMENT INITIATED VIA _LUE AVG____. NO CONCERNS NOTED.     [x] Time Out/Safety Check  Time:_1215   INTRADIALYTIC MONITORING  (SEE ATTACHED FLOWSHEET)     POST TREATMENT    Time Medication Dose Volume Route    Initials                                           [facility] Signatures Title Initials Time   STEPHEN ZHOU RN PAP                Dialyzer cleared: [x] Good [] Fair [] Poor     Blood Processed _57.4__Liters    Net UF Removed __2687__mL  Post Tx Access:                  AVF/AVG: Bleeding Stop       Art._8__min Chaim.__8_min [x]+bruit/thrill                Catheter: Locking Solution [] Heparin 1 ml/1000 units  [] Normal Saline                                                                               Art._____ ml Chaim._____ml  Post Assessment:              Skin: [x]Warm [x]Dry []Diaphoretic []Flushed [] Pale []Cyanotic            Lungs: [x]Clear []Coarse []Crackles []Wheezing             Cardiac: [x]Regular []Irregular  []Monitored rhythm_SINUS TACH___ []N/A            Edema: []None []General []Facial []Pedal  []UE [x]LE []RIGHT []LEFT            Pain: [x]0 []1 []2 []3 []4 []5 []6 []7 []8 []9 []10   POST Tx Note:TREATMENT COMPLETED. ALL POSSIBLE BLOOD RETURNED. PT TOLERATED WELL. VITAL SIGNS WNL  FOR PATIENT. NAD. DE CANULATED ARTERIAL AND VENOUS SITES WITHOUT INCIDENT. REPORT GIVEN WITH OPPORTUNITY TO ADDRESS ANY CONCERNS OR QUESTTIONS AND PATIENT STAYS ROOM IN BED WITHOUT DISTRESS OR ACUTE DISCOMFORT. BED LOWED, CALL BELL WITHIN REACH .      Primary Nurse Report: First initial/Last name/Title    Post Dialysis:_ Krista Goldberg RN___         Time:__1700___    Abbreviations: AVG-arterial venous graft, AVF-arterial venous fistula, IJ-Internal Jugular,  Subcl-Subclavian, Fem-Femoral, Tx-treatment, AP/HR-apical heart rate, DFR-dialysate flow rate, BFR-blood flow rate, AP-arterial pressure, -venous pressure, UF-ultrafiltrate, TMP-transmembrane pressure, Chaim-Venous, Art-Arterial, RO-Reverse Osmosis

## 2021-08-29 ENCOUNTER — APPOINTMENT (OUTPATIENT)
Dept: CT IMAGING | Age: 51
DRG: 640 | End: 2021-08-29
Attending: FAMILY MEDICINE
Payer: MEDICARE

## 2021-08-29 ENCOUNTER — APPOINTMENT (OUTPATIENT)
Dept: MRI IMAGING | Age: 51
DRG: 640 | End: 2021-08-29
Attending: PSYCHIATRY & NEUROLOGY
Payer: MEDICARE

## 2021-08-29 PROBLEM — I63.9 STROKE (HCC): Status: ACTIVE | Noted: 2021-08-29

## 2021-08-29 LAB
ALBUMIN SERPL-MCNC: 2.6 G/DL (ref 3.4–5)
ALBUMIN/GLOB SERPL: 0.5 {RATIO} (ref 0.8–1.7)
ALP SERPL-CCNC: 79 U/L (ref 45–117)
ALT SERPL-CCNC: 17 U/L (ref 16–61)
AMMONIA PLAS-SCNC: 32 UMOL/L (ref 11–32)
ANION GAP SERPL CALC-SCNC: 10 MMOL/L (ref 3–18)
AST SERPL-CCNC: 6 U/L (ref 10–38)
BASOPHILS # BLD: 0 K/UL (ref 0–0.1)
BASOPHILS NFR BLD: 1 % (ref 0–2)
BILIRUB SERPL-MCNC: 0.5 MG/DL (ref 0.2–1)
BUN SERPL-MCNC: 58 MG/DL (ref 7–18)
BUN/CREAT SERPL: 5 (ref 12–20)
CA-I SERPL-SCNC: 0.92 MMOL/L (ref 1.12–1.32)
CALCIUM SERPL-MCNC: 7 MG/DL (ref 8.5–10.1)
CHLORIDE SERPL-SCNC: 103 MMOL/L (ref 100–111)
CO2 SERPL-SCNC: 24 MMOL/L (ref 21–32)
CREAT SERPL-MCNC: 11.5 MG/DL (ref 0.6–1.3)
DIFFERENTIAL METHOD BLD: ABNORMAL
EOSINOPHIL # BLD: 0.2 K/UL (ref 0–0.4)
EOSINOPHIL NFR BLD: 6 % (ref 0–5)
ERYTHROCYTE [DISTWIDTH] IN BLOOD BY AUTOMATED COUNT: 19.7 % (ref 11.6–14.5)
FOLATE SERPL-MCNC: 4.2 NG/ML (ref 3.1–17.5)
GLOBULIN SER CALC-MCNC: 4.9 G/DL (ref 2–4)
GLUCOSE BLD STRIP.AUTO-MCNC: 114 MG/DL (ref 70–110)
GLUCOSE BLD STRIP.AUTO-MCNC: 77 MG/DL (ref 70–110)
GLUCOSE BLD STRIP.AUTO-MCNC: 82 MG/DL (ref 70–110)
GLUCOSE BLD STRIP.AUTO-MCNC: 84 MG/DL (ref 70–110)
GLUCOSE BLD STRIP.AUTO-MCNC: 91 MG/DL (ref 70–110)
GLUCOSE SERPL-MCNC: 89 MG/DL (ref 74–99)
HCT VFR BLD AUTO: 32.8 % (ref 36–48)
HGB BLD-MCNC: 11.2 G/DL (ref 13–16)
LYMPHOCYTES # BLD: 0.6 K/UL (ref 0.9–3.6)
LYMPHOCYTES NFR BLD: 15 % (ref 21–52)
MAGNESIUM SERPL-MCNC: 2.3 MG/DL (ref 1.6–2.6)
MCH RBC QN AUTO: 25.5 PG (ref 24–34)
MCHC RBC AUTO-ENTMCNC: 34.1 G/DL (ref 31–37)
MCV RBC AUTO: 74.7 FL (ref 78–100)
MONOCYTES # BLD: 1.1 K/UL (ref 0.05–1.2)
MONOCYTES NFR BLD: 24 % (ref 3–10)
NEUTS SEG # BLD: 2.4 K/UL (ref 1.8–8)
NEUTS SEG NFR BLD: 55 % (ref 40–73)
PHOSPHATE SERPL-MCNC: 6.2 MG/DL (ref 2.5–4.9)
PLATELET # BLD AUTO: 125 K/UL (ref 135–420)
POTASSIUM SERPL-SCNC: 4.3 MMOL/L (ref 3.5–5.5)
PROT SERPL-MCNC: 7.5 G/DL (ref 6.4–8.2)
RBC # BLD AUTO: 4.39 M/UL (ref 4.35–5.65)
SODIUM SERPL-SCNC: 137 MMOL/L (ref 136–145)
TSH SERPL DL<=0.05 MIU/L-ACNC: 2.38 UIU/ML (ref 0.36–3.74)
VIT B12 SERPL-MCNC: 418 PG/ML (ref 211–911)
WBC # BLD AUTO: 4.4 K/UL (ref 4.6–13.2)

## 2021-08-29 PROCEDURE — 82140 ASSAY OF AMMONIA: CPT

## 2021-08-29 PROCEDURE — 74011250637 HC RX REV CODE- 250/637: Performed by: STUDENT IN AN ORGANIZED HEALTH CARE EDUCATION/TRAINING PROGRAM

## 2021-08-29 PROCEDURE — 85025 COMPLETE CBC W/AUTO DIFF WBC: CPT

## 2021-08-29 PROCEDURE — 36415 COLL VENOUS BLD VENIPUNCTURE: CPT

## 2021-08-29 PROCEDURE — 74011250636 HC RX REV CODE- 250/636: Performed by: FAMILY MEDICINE

## 2021-08-29 PROCEDURE — 74011250636 HC RX REV CODE- 250/636: Performed by: INTERNAL MEDICINE

## 2021-08-29 PROCEDURE — 74011250637 HC RX REV CODE- 250/637: Performed by: FAMILY MEDICINE

## 2021-08-29 PROCEDURE — 80053 COMPREHEN METABOLIC PANEL: CPT

## 2021-08-29 PROCEDURE — 84100 ASSAY OF PHOSPHORUS: CPT

## 2021-08-29 PROCEDURE — 82607 VITAMIN B-12: CPT

## 2021-08-29 PROCEDURE — C9113 INJ PANTOPRAZOLE SODIUM, VIA: HCPCS | Performed by: INTERNAL MEDICINE

## 2021-08-29 PROCEDURE — 70496 CT ANGIOGRAPHY HEAD: CPT

## 2021-08-29 PROCEDURE — 83735 ASSAY OF MAGNESIUM: CPT

## 2021-08-29 PROCEDURE — 83036 HEMOGLOBIN GLYCOSYLATED A1C: CPT

## 2021-08-29 PROCEDURE — 74011250637 HC RX REV CODE- 250/637: Performed by: PSYCHIATRY & NEUROLOGY

## 2021-08-29 PROCEDURE — 74011000636 HC RX REV CODE- 636: Performed by: INTERNAL MEDICINE

## 2021-08-29 PROCEDURE — 82330 ASSAY OF CALCIUM: CPT

## 2021-08-29 PROCEDURE — 84443 ASSAY THYROID STIM HORMONE: CPT

## 2021-08-29 PROCEDURE — 74011000250 HC RX REV CODE- 250: Performed by: INTERNAL MEDICINE

## 2021-08-29 PROCEDURE — 65660000000 HC RM CCU STEPDOWN

## 2021-08-29 PROCEDURE — 82962 GLUCOSE BLOOD TEST: CPT

## 2021-08-29 PROCEDURE — 70450 CT HEAD/BRAIN W/O DYE: CPT

## 2021-08-29 RX ORDER — CALCIUM GLUCONATE 20 MG/ML
1 INJECTION, SOLUTION INTRAVENOUS ONCE
Status: COMPLETED | OUTPATIENT
Start: 2021-08-29 | End: 2021-08-29

## 2021-08-29 RX ORDER — GUAIFENESIN 100 MG/5ML
325 LIQUID (ML) ORAL
Status: COMPLETED | OUTPATIENT
Start: 2021-08-29 | End: 2021-08-29

## 2021-08-29 RX ORDER — LABETALOL HCL 20 MG/4 ML
10 SYRINGE (ML) INTRAVENOUS
Status: DISCONTINUED | OUTPATIENT
Start: 2021-08-29 | End: 2021-08-31

## 2021-08-29 RX ORDER — LABETALOL HCL 20 MG/4 ML
10 SYRINGE (ML) INTRAVENOUS
Status: DISCONTINUED | OUTPATIENT
Start: 2021-08-29 | End: 2021-08-29

## 2021-08-29 RX ORDER — ASPIRIN 81 MG/1
81 TABLET ORAL DAILY
Status: DISCONTINUED | OUTPATIENT
Start: 2021-08-29 | End: 2021-09-06 | Stop reason: HOSPADM

## 2021-08-29 RX ADMIN — CALCIUM GLUCONATE 1000 MG: 20 INJECTION, SOLUTION INTRAVENOUS at 06:34

## 2021-08-29 RX ADMIN — WATER 1 G: 1 INJECTION INTRAMUSCULAR; INTRAVENOUS; SUBCUTANEOUS at 21:10

## 2021-08-29 RX ADMIN — LEVOFLOXACIN 500 MG: 500 INJECTION, SOLUTION INTRAVENOUS at 02:17

## 2021-08-29 RX ADMIN — IOPAMIDOL 80 ML: 755 INJECTION, SOLUTION INTRAVENOUS at 01:00

## 2021-08-29 RX ADMIN — WATER 1 G: 1 INJECTION INTRAMUSCULAR; INTRAVENOUS; SUBCUTANEOUS at 11:10

## 2021-08-29 RX ADMIN — Medication 10 ML: at 06:35

## 2021-08-29 RX ADMIN — ASPIRIN 81 MG: 81 TABLET, COATED ORAL at 14:16

## 2021-08-29 RX ADMIN — Medication 10 ML: at 14:00

## 2021-08-29 RX ADMIN — PANTOPRAZOLE SODIUM 40 MG: 40 INJECTION, POWDER, FOR SOLUTION INTRAVENOUS at 02:18

## 2021-08-29 RX ADMIN — ASPIRIN 324 MG: 81 TABLET, CHEWABLE ORAL at 00:45

## 2021-08-29 RX ADMIN — WATER 1 G: 1 INJECTION INTRAMUSCULAR; INTRAVENOUS; SUBCUTANEOUS at 02:17

## 2021-08-29 NOTE — PROGRESS NOTES
Problem: Falls - Risk of  Goal: *Absence of Falls  Description: Document Bernice Awad Fall Risk and appropriate interventions in the flowsheet. Outcome: Progressing Towards Goal  Note: Fall Risk Interventions:  Mobility Interventions: Bed/chair exit alarm, Communicate number of staff needed for ambulation/transfer, Mechanical lift, OT consult for ADLs, Patient to call before getting OOB, PT Consult for mobility concerns, PT Consult for assist device competence, Strengthening exercises (ROM-active/passive)    Mentation Interventions: Adequate sleep, hydration, pain control, Bed/chair exit alarm, Door open when patient unattended, Evaluate medications/consider consulting pharmacy, Eyeglasses and hearing aids, Increase mobility, More frequent rounding, Familiar objects from home, Reorient patient, Toileting rounds    Medication Interventions: Bed/chair exit alarm, Evaluate medications/consider consulting pharmacy, Patient to call before getting OOB, Teach patient to arise slowly    Elimination Interventions: Bed/chair exit alarm, Call light in reach, Elevated toilet seat, Patient to call for help with toileting needs, Stay With Me (per policy), Toilet paper/wipes in reach, Toileting schedule/hourly rounds              Problem: Patient Education: Go to Patient Education Activity  Goal: Patient/Family Education  Outcome: Progressing Towards Goal     Problem: Risk for Spread of Infection  Goal: Prevent transmission of infectious organism to others  Description: Prevent the transmission of infectious organisms to other patients, staff members, and visitors.   Outcome: Progressing Towards Goal     Problem: Patient Education:  Go to Education Activity  Goal: Patient/Family Education  Outcome: Progressing Towards Goal     Problem: Chronic Renal Failure  Goal: *Fluid and electrolytes stabilized  Outcome: Progressing Towards Goal     Problem: Patient Education: Go to Patient Education Activity  Goal: Patient/Family Education  Outcome: Progressing Towards Goal     Problem: Pressure Injury - Risk of  Goal: *Prevention of pressure injury  Description: Document Tucker Scale and appropriate interventions in the flowsheet. Outcome: Progressing Towards Goal  Note: Pressure Injury Interventions:  Sensory Interventions: Assess changes in LOC, Assess need for specialty bed, Chair cushion, Check visual cues for pain, Discuss PT/OT consult with provider, Float heels, Keep linens dry and wrinkle-free, Turn and reposition approx. every two hours (pillows and wedges if needed), Pressure redistribution bed/mattress (bed type), Pad between skin to skin, Monitor skin under medical devices, Minimize linen layers         Activity Interventions: Assess need for specialty bed, Chair cushion, Increase time out of bed, Pressure redistribution bed/mattress(bed type), PT/OT evaluation    Mobility Interventions: Assess need for specialty bed, Chair cushion, Float heels, Pressure redistribution bed/mattress (bed type), PT/OT evaluation, Turn and reposition approx.  every two hours(pillow and wedges)    Nutrition Interventions: Document food/fluid/supplement intake, Discuss nutritional consult with provider, Offer support with meals,snacks and hydration    Friction and Shear Interventions: Apply protective barrier, creams and emollients, Feet elevated on foot rest, Foam dressings/transparent film/skin sealants, Lift sheet, Lift team/patient mobility team, Minimize layers, Transferring/repositioning devices                Problem: Patient Education: Go to Patient Education Activity  Goal: Patient/Family Education  Outcome: Progressing Towards Goal

## 2021-08-29 NOTE — PROGRESS NOTES
Noted a change in pt's mental status as evidenced by not being able to tell me his name during med admin. When asked for his name he was somewhat stuttering and showing signs of expressive aphasia. He then went on to say \"i'm sorry I can't remember\". He was not also able to tell me his date of birth, where he was at, date/time although he tried but had a difficult time trying to mouth his words. This was not the case during my first encounter with the patient and initial assessment around 1900. Called RRT on pt immediately, nursing supervisor Laureen Carl to bedside, brought pt to CT.

## 2021-08-29 NOTE — PROGRESS NOTES
Responded to RRT, on assessment patient noted to have expressive aphasia, negative for other stroke s/sx. Code Stroke called. MD at bedside patient to CT. Pt returned to room,  mg ordered and given. Addressed    patient's fears  re: stroke vs encephalopathy, pt verbalized understanading.   NAD

## 2021-08-29 NOTE — PROGRESS NOTES
Hospitalist Progress Note    Patient: Santiago Weldon. MRN: [id number]  [facility]: [id number]    YOB: 1970  Age: 54 y.o. Sex: male    DOA: 8/26/2021 LOS:  LOS: 2 days                Assessment and Plan:    Principal Problem:    Hyperkalemia (8/27/2021)    Active Problems:    Cellulitis and abscess of foot (1/29/2020)      Hypertensive crisis (8/27/2021)      Uremia (8/27/2021)      Hypoglycemia associated with diabetes ([address] (8/27/2021)      Non-compliance with renal dialysis ([facility] (8/27/2021)      Stroke ([facility] [address] (8/29/2021)        Diabetes: numbers look better      Hyperkalemia/ARF/CKD: S/P emergent dialysis. Got more yesterday     HTN: some of the outpatient meds started yesterday    Blood pressure is better   but we then held some of them due to possibility of stroke and permissive hypertension     Chronic diarrhea:  Stool  was indeterminate for cdiff and is likely made worse by current antibiotics  Repeat is sent      Heel ulcer:  Seen by podiatry . They recommended a MRI and cefipime    Possible Stroke: out of the window for TPA. Unclear as to what really happened. MRI pending  Neurology was consulted. He was given aspirin yesterday at midnight. So will ask neurology which to start now     Chief complaint:  Low blood sugar and hypertensive urgency        Subjective:    He is pleasant and mildly confused this am which is typical for him. Review of systems:    General: No fevers or chills. Cardiovascular: No chest pain or pressure. No palpitations. Pulmonary: No shortness of breath. Gastrointestinal: No nausea, vomiting. Objective:    Vital signs/Intake and Output:  Visit Vitals  BP (!) 136/58   Pulse 79   Temp 97.7 °F (36.5 °C)   Resp 17   Ht 5' 7\" (1.702 m)   Wt 78.9 kg (174 lb)   SpO2 98%   BMI 27.25 kg/m²     Current Shift:  No intake/output data recorded.   Last three shifts:  08/27 1901 - 08/29 0700  In: 240 [P.O.:240]  Out: 3087 [Urine:400]    Physical Exam:  General: NAD, AAOx3. Non-toxic. HEENT: NC/AT. PERRLA, EOMI.  MMM. Lungs: Nml inspection. CTA B/L. No wheezing, rales or rhonchi. Heart:  S1S2 RRR,  PMI mid 5th IC space. No M/RG. Abdomen: Soft, NT/ND.  BS+. No peritoneal signs. Extremities: No C/C/E. Psych:   Nml affect. Neurologic:  2-12 intact. Strength 5/5 throughout. Sensation symmetrical.          Labs: Results:       Chemistry Recent Labs     08/29/21  0120 08/28/21  0704 08/28/21  0703 08/27/21  1000 08/27/21  1000 08/27/21  0500 08/27/21  0500   GLU 89  --  80  --  81   < > 140*     --  139  --  138   < > 136   K 4.3  --  5.2  --  4.1   < > 7.0*     --  107  --  106   < > 111   CO2 24  --  17*  --  19*   < > 9*   BUN 58*  --  95*  --  79*   < > 133*   CREA 11.50*  --  15.60*  --  13.10*   < > 20.30*   CA 7.0* 6.9* 6.6*   < > 7.5*   < > 7.0*   AGAP 10  --  15  --  13   < > 16   BUCR 5*  --  6*  --  6*   < > 7*   AP 79  --  73  --   --   --  73   TP 7.5  --  7.2  --   --   --  7.6   ALB 2.6*  --  2.5*  --   --   --  2.8*   GLOB 4.9*  --  4.7*  --   --   --  4.8*   AGRAT 0.5*  --  0.5*  --   --   --  0.6*    < > = values in this interval not displayed. CBC w/Diff Recent Labs     08/29/21  0120 08/28/21  0703 08/27/21  0500   WBC 4.4* 4.7 4.5*   RBC 4.39 4.01* 3.83*   HGB 11.2* 10.5* 9.8*   HCT 32.8* 30.8* 29.2*   * 119* 97*   GRANS 55 60 79*   LYMPH 15* 16* 6*   EOS 6* 4 1      Cardiac Enzymes Recent Labs     08/27/21  1617 08/27/21  1000    117   CKND1 1.7 2.2      Coagulation No results for input(s): PTP, INR, APTT, INREXT, INREXT in the last 72 hours. Lipid Panel No results found for: CHOL, CHOLPOCT, CHOLX, CHLST, CHOLV, 100175, HDL, HDLP, LDL, LDLC, DLDLP, 850987, VLDLC, VLDL, TGLX, TRIGL, TRIGP, TGLPOCT, CHHD, CHHDX   BNP No results for input(s): BNPP in the last 72 hours.    Liver Enzymes Recent Labs     08/29/21  0120   TP 7.5   ALB 2.6*   AP 79      Thyroid Studies Lab Results   Component Value Date/Time    TSH 2.38 08/29/2021 01:20 AM        Procedures/imaging: see electronic medical records for all procedures/Xrays and details which were not copied into this note but were reviewed prior to creation of Plan

## 2021-08-29 NOTE — PROGRESS NOTES
RENAL CONSULT FOLLOW UP NOTE   2021    Patient:  Luz Elena Ambriz :  1970  Gender:  male  MRN #:  [id number]    Reason for Consult: Management of Hyperkalemia and Severe metabolic acidosis in setting of ESRD requested by ED physician Dr Mackenzie Grande     Assessment:  Luz Elena Ambriz is a 54y.o. year old male  With h/o ESRD on HD (MWF) , Uncontrolled HTN, DM, CHF with EF 40 % , right BKA status , chronic diarrhea and abdominal discomfort was brought by EMS due to unresponsiveness. He was found to have hypoglycemia requiring dextrose drip initially . He is non compliant with dialysis session and medications . Mostly does not take anti hypertensive meds and comes with uncontrolled HTN in dialysis unit . He had missed dialysis more than 1 week due to not feeling well and diarrhea . He was found to have Hypertensive urgency on admission with hyperkalemia, significant metabolic acidosis and pulmonary vascular congestion in CXR. Initially admitted  in ICU for further care  On insulin , dextrose drip for Hyperkalemia ant transferred in floor after dialysis. Overnight events:   - Reportedly he was confused overnight and become aphasic , there was suspicion stroke  He definitely seems confused, awake but does not follow command   BP is stable for last 24 hours except one episodes of low /77 mmHg yesterday     Plan:      # ESRD-  He had dialysis on Friday and Saturday both. Patient examined and labs review , he is breathing comfortably in room air. Does not have hyperkalemia and acidosis .  No clinical indication of dialysis today   Next dialysis on Monday    - Appreciate neurology assistance , suspicion of stroke , recommended work up  - Encephalopathy could be toxic/metabolic/infectious  or stroke , BP is stable for more than last 24 Hours less likely hypertensive encephalopathy , consider infectious work of as well   - Intake and output recording   - Dose all meds for ESRD    # Metabolic Acidosis -Improved       # Chronic Diarrhea :- Has h/o C.diff infection in the past .  He has genuine problems of chronic diarrhea for many months. Have seen him going to bathroom every 10 minutes in dialysis unit He mostly complaints of abdominal discomfort which is the reason why he said he miss dialysis  On 8/20 /21: stool showed- Indeterminate for Toxigenic C. Difficile  His symptoms could be due to chronic C.diff infection , may benefit from chronic suppressive therapy or any other intervention   - consider ID consult this admission for this   - Also he mostly complaints of abdominal discomfort and has significant anemia mostly . Not sure if he has chronic GI bleed from colitis   - consider CT abdomen and pelvis once he is more stable prior to discharge and GI consult if he needs colonoscopy due to clinical symptoms of colitis     # Hypertension: -  Stable at present . As per neurology allow permissive hypertensive , not to treat unless BP is above > 220/110 mmHg   Iv Labetalol 10 mg q 6 hr prn if SBP > 220/110    Hold  coreg 25 mg BID , amlodipine 10 mg , Hydralazine 50 mg TID and Imdur 60 mg        # Anemia: Hemoglobin is below target range   Not iron deficient    EVIE during dialysis   Iron profile     # BMD- Ionized calcium is borderline low - Already received 1 gram calcium gluconate    Phos  is high- continue  sevelamer 1600 mg TID   Secondary Hyperparathyroidism, Elevated PTH- continue  Zemplar 4 mcg during HD.   Run on 3 mmol/lt calcium bath       Rest of the medical management per primary team       Subjective:   -he is awake but confused today   - seems aphasic , did not talk at all   - breathing is fine   - BP stable     Intake/Output Summary (Last 24 hours) at 8/29/2021 1518  Last data filed at 8/29/2021 1110  Gross per 24 hour   Intake 490 ml   Output 2687 ml   Net -2197 ml         Objective:    Visit Vitals  BP (!) 148/80   Pulse 84   Temp 97.7 °F (36.5 °C)   Resp 20   Ht 5' 7\" (1.702 m)   Wt 78.9 kg (174 lb)   SpO2 98%   BMI 27.25 kg/m²       Physical Exam:    Pt awake  Lung: clear to auscultation    Ext: RT- BKA status , left - no edema, heel ulcer/infection    Laboratory Data:    Lab Results   Component Value Date    BUN 58 (H) 08/29/2021    BUN 95 (H) 08/28/2021    BUN 79 (H) 08/27/2021     08/29/2021     08/28/2021     08/27/2021    CO2 24 08/29/2021    CO2 17 (L) 08/28/2021    CO2 19 (L) 08/27/2021     Lab Results   Component Value Date    WBC 4.4 (L) 08/29/2021    HGB 11.2 (L) 08/29/2021    HCT 32.8 (L) 08/29/2021     No components found for: CALCIUM, PHOSPHORUS, MAGNESIUM  No results found for: HDL  No results found for: SPECIMENTYP, TURBIDITY, UGLU    Imaging Reveiwed:    CXR 8/27/21- 1. Adequate right subclavian central venous catheter placement with no  postprocedural complication. 2. Cardiomegaly with central vascular congestion and interstitial pulmonary  edema, unchanged. 8/29/21: CTA head; No definite large vessel occlusion.   No hemodynamically significant ICA stenosis, based on NASCET criteria. Moderate to severe stenosis involving right vertebral artery origin.   No high-grade intracranial stenosis.  Mild atherosclerotic narrowing of the  bilateral carotid siphons.       Ghanshyam Navarro MD   [facility], [facility].- Nephrology

## 2021-08-29 NOTE — ROUTINE PROCESS
Bedside and Verbal shift change report given to Tess Goodrich (oncoming nurse) by Enmanuel Mcneil (offgoing nurse). Report included the following information SBAR, Kardex and MAR.

## 2021-08-29 NOTE — CONSULTS
NEUROLOGY CONSULTATION NOTE    Patient: Aretha Hodges MRN: [id number]  CSN: [id number]    YOB: 1970  Age: 54 y.o. Sex: male    DOA: 8/26/2021 LOS:  LOS: 2 days        Requesting Physician: Dr. Vivian Del Rosario  Reason for Consultation: Stroke? HISTORY OF PRESENT ILLNESS:   Aretha Hodges is a 54 y.o. male with history of DM, CHF, HTN, Sickle cell trait, CKD on HD, who presented with remarkable confusion in the setting of missing dialysis, hypoglycemia and intermittent diarrhea. The patient was seen to have aphasia yesterday. He was seen by teleneurology and was not regarded to be a tPA candidate. He is not able to provide history to me. He answers questions \"yes or yes sir\" only. Head CT did not show any acute events. He was given a loading dose of aspirin yesterday. PAST MEDICAL HISTORY:  Past Medical History:   Diagnosis Date    Chronic kidney disease     Diabetes ([facility]     Heart failure ([facility]     Hypertension     Sickle cell trait ([facility] [address] [facility]      PAST SURGICAL HISTORY:  Past Surgical History:   Procedure Laterality Date    HX ORTHOPAEDIC      right BKA 2017     FAMILY HISTORY:  No family history on file. SOCIAL HISTORY:  Social History     Socioeconomic History    Marital status: LEGALLY      Spouse name: Not on file    Number of children: Not on file    Years of education: Not on file    Highest education level: Not on file   Tobacco Use    Smoking status: Former Smoker    Smokeless tobacco: Never Used   Substance and Sexual Activity    Alcohol use: No    Drug use: No     Social Determinants of Health     Financial Resource Strain:     Difficulty of Paying Living Expenses:    Food Insecurity:     Worried About Running Out of Food in the Last Year:     [address] in the Last Year:    Transportation Needs:     Lack of Transportation (Medical):      Lack of Transportation (Non-Medical):    Physical Activity:     Days of Exercise per Week:     Minutes of Exercise per Session:    Stress:     Feeling of Stress :    Social Connections:     Frequency of Communication with Friends and Family:     Frequency of Social Gatherings with Friends and Family:     Attends Faith Services:     Active Member of Clubs or Organizations:     Attends Club or Organization Meetings:     Marital Status:      MEDICATIONS:  Current Facility-Administered Medications   Medication Dose Route Frequency    paricalcitoL (ZEMPLAR) injection 4 mcg  4 mcg IntraVENous DIALYSIS MON, WED & FRI    acetaminophen (TYLENOL) tablet 650 mg  650 mg Oral Q6H PRN    Or    acetaminophen (TYLENOL) suppository 650 mg  650 mg Rectal Q6H PRN    polyethylene glycol (MIRALAX) packet 17 g  17 g Oral DAILY PRN    [Held by provider] heparin (porcine) injection 5,000 Units  5,000 Units SubCUTAneous Q8H    promethazine (PHENERGAN) tablet 12.5 mg  12.5 mg Oral Q6H PRN    Or    ondansetron (ZOFRAN) injection 4 mg  4 mg IntraVENous Q6H PRN    pantoprazole (PROTONIX) injection 40 mg  40 mg IntraVENous Q24H    cefepime (MAXIPIME) 1 g in sterile water (preservative free) 10 mL IV syringe  1 g IntraVENous Q8H    levoFLOXacin (LEVAQUIN) 500 mg in D5W IVPB  500 mg IntraVENous Q48H    sodium chloride (NS) flush 5-40 mL  5-40 mL IntraVENous Q8H    sodium chloride (NS) flush 5-40 mL  5-40 mL IntraVENous PRN    insulin lispro (HUMALOG) injection   SubCUTAneous Q4H    [Held by provider] amLODIPine (NORVASC) tablet 10 mg  10 mg Oral DAILY    [Held by provider] carvediloL (COREG) tablet 25 mg  25 mg Oral BID WITH MEALS    [Held by provider] isosorbide mononitrate ER (IMDUR) tablet 60 mg  60 mg Oral DAILY    [Held by provider] hydrALAZINE (APRESOLINE) tablet 50 mg  50 mg Oral TID    sevelamer carbonate (RENVELA) tab 1,600 mg  1,600 mg Oral TID WITH MEALS    hydrALAZINE (APRESOLINE) 20 mg/mL injection 10 mg  10 mg IntraVENous Q6H PRN    heparin (porcine) 1,000 unit/mL injection 2,000 Units  2,000 Units Hemodialysis DIALYSIS PRN    sodium chloride (NS) flush 5-10 mL  5-10 mL IntraVENous PRN     Prior to Admission medications    Medication Sig Start Date End Date Taking? Authorizing Provider   fidaxomicin (DIFICID) 200 mg tab tablet Take 1 Tab by mouth two (2) times a day. 12/24/20   Konrad Antunez MD   doxazosin (CARDURA) 4 mg tablet Take 2 mg by mouth daily. Provider, Historical   sodium hypochlorite 0.0125% (DAKINS SOLUTION) Apply 1 mL to affected area two (2) times a week. Provider, Historical   omeprazole (PRILOSEC) 40 mg capsule Take 40 mg by mouth daily. Provider, Historical   sevelamer carbonate (RENVELA) 800 mg tab tab Take 800 mg by mouth three (3) times daily (with meals). Provider, Historical   sodium bicarbonate 650 mg tablet Take 650 mg by mouth three (3) times daily. Provider, Historical   cefazolin sodium/dextrose,iso (CEFAZOLIN IN DEXTROSE, ISO-OS,) 2 gram/50 mL pgbk 50 mL by IntraVENous route every Monday. Mwf, 2-2-3 g after HD 2/5/20   Caroline Pachceo MD   amLODIPine (NORVASC) 10 mg tablet Take 1 Tab by mouth daily. 3/8/18   Caroline Pacheco MD   carvedilol (COREG) 25 mg tablet Take 1 Tab by mouth two (2) times daily (with meals). 3/7/18   Caroline Pacheco MD   hydrALAZINE (APRESOLINE) 25 mg tablet Take 1 Tab by mouth three (3) times daily. 3/7/18   Caroline Pacheco MD   isosorbide mononitrate ER (IMDUR) 60 mg CR tablet Take 1 Tab by mouth daily. 3/8/18   Caroline Pacheco MD   furosemide (LASIX) 80 mg tablet Take 1 Tab by mouth two (2) times a day. 3/7/18   Caroline Pacheco MD       ALLERGIES:  Allergies   Allergen Reactions    Penicillins Rash    Vancomycin Other (comments)     kayla syndrome       Review of Systems  I am not able to review systems. He is aphasic. He says \"yes\" to every question.     PHYSICAL EXAMINATION:     Visit Vitals  BP (!) 136/58   Pulse 79   Temp 97.7 °F (36.5 °C)   Resp 17   Ht 5' 7\" (1.702 m)   Wt 78.9 kg (174 lb)   SpO2 98%   BMI 27.25 kg/m²      O2 Device: None (Room air)  GENERAL: Found in bed, no distress. HEENT: Moist mucous membranes, sclerae anicteric, scalp is atraumatic. CVS: Regular rate and rhythm, no murmurs or gallops. No carotid bruits. PULMONARY: Clear to auscultation bilaterally. No rales or rhonchi. No wheezing. EXTREMITIES: Right BKA. ABDOMEN: Soft, nontender. SKIN: No rashes or ecchymoses. Warm and dry. NEUROLOGIC: States his name and last name. He can not tell me where he is. He starts saying \"yes\" to everything after I ask more questions. He is not following commands, again saying \"yes sir\". Face is symmetric. EOMI. PERRL. He was able to give me hand , 4/5 bilaterally. He moves all the extremities with repeated commands. DTRs look symmetric. Labs: Results:       Chemistry Recent Labs     08/29/21  0120 08/28/21  0704 08/28/21  0703 08/27/21  1000 08/27/21  1000 08/27/21  0500 08/27/21  0500   GLU 89  --  80  --  81   < > 140*     --  139  --  138   < > 136   K 4.3  --  5.2  --  4.1   < > 7.0*     --  107  --  106   < > 111   CO2 24  --  17*  --  19*   < > 9*   BUN 58*  --  95*  --  79*   < > 133*   CREA 11.50*  --  15.60*  --  13.10*   < > 20.30*   CA 7.0* 6.9* 6.6*   < > 7.5*   < > 7.0*   AGAP 10  --  15  --  13   < > 16   BUCR 5*  --  6*  --  6*   < > 7*   AP 79  --  73  --   --   --  73   TP 7.5  --  7.2  --   --   --  7.6   ALB 2.6*  --  2.5*  --   --   --  2.8*   GLOB 4.9*  --  4.7*  --   --   --  4.8*   AGRAT 0.5*  --  0.5*  --   --   --  0.6*    < > = values in this interval not displayed. CBC w/Diff Recent Labs     08/29/21  0120 08/28/21  0703 08/27/21  0500   WBC 4.4* 4.7 4.5*   RBC 4.39 4.01* 3.83*   HGB 11.2* 10.5* 9.8*   HCT 32.8* 30.8* 29.2*   * 119* 97*   GRANS 55 60 79*   LYMPH 15* 16* 6*   EOS 6* 4 1      Cardiac Enzymes Recent Labs     08/27/21  1617 08/27/21  1000    117   CKND1 1.7 2.2      Coagulation No results for input(s): PTP, INR, APTT, INREXT in the last 72 hours.     Lipid Panel No results found for: CHOL, CHOLPOCT, CHOLX, CHLST, CHOLV, [id number], HDL, HDLP, LDL, LDLC, DLDLP, 732703, VLDLC, VLDL, TGLX, TRIGL, TRIGP, TGLPOCT, CHHD, CHHDX   BNP No results for input(s): BNPP in the last 72 hours. Liver Enzymes Recent Labs     08/29/21  0120   TP 7.5   ALB 2.6*   AP 79      Thyroid Studies Lab Results   Component Value Date/Time    TSH 2.38 08/29/2021 01:20 AM          Radiology:  XR FOOT LT MIN 3 V    Result Date: 8/27/2021  EXAM: XR FOOT LT MIN 3 V CLINICAL INDICATION/HISTORY: Left foot pain and erythema, evaluate for foreign body, osteomyelitis  COMPARISON: None. TECHNIQUE: AP, lateral, and oblique views of the left foot were obtained. _______________ FINDINGS: Soft tissue irregularity throughout mid and posterior aspects of the plantar left foot with diffuse soft tissue thickening. Prominent atherosclerotic vascular calcification. Diffuse, heterogeneous sclerosis throughout the calcaneus. Currently there is no associated cortical defect or periosteal reaction to indicate an acute component of osteomyelitis. No radiodense foreign body. Well-corticated osseous fragment involving distal Achilles tendon insertion, are listed from the posterior calcaneus. Pes planus morphology. Surgical changes suggestive of previous mid diaphyseal second metatarsal amputation with adjacent curvilinear heterotopic ossification. _______________     Cellulitis and ulceration involving left hindfoot with findings suggestive of chronic osteomyelitis involving the calcaneus. No radiographic evidence of active osteomyelitis within the left foot. XR ABD (KUB)    Result Date: 8/20/2021  EXAM: SUPINE ABDOMINAL RADIOGRAPH CLINICAL INDICATION/HISTORY: diarrhea -Additional: None COMPARISON: 12/22/2020 TECHNIQUE: Single supine view of the abdomen. _______________ FINDINGS: BOWEL GAS PATTERN: No evidence of obstruction. No visible free air, given limitations of supine view. BONES: Unremarkable.  OTHER: None. _______________     No significant abnormality. MRI ANKLE LT WO CONT    Result Date: 8/28/2021  EXAMINATION: MRI ANKLE LT WO CONT CLINICAL INDICATION/HISTORY: ulcer diabetic  >Additional: None TECHNIQUE: T1 weighted sagittal, STIR sagittal, proton FSE axial, fat suppressed T2 FSE axial, fat suppressed T2 FSE coronal, proton FSE oblique axial. COMPARISON EXAMS: January 29, 2020 _______________ FINDINGS: OSSEOUS: There are extensive osseous erosions an bony resorption throughout the posterior inferior calcaneus, markedly progressed in the interval since the prior study with associated bony remodeling and diffuse marrow edema throughout the posterior body. Diminished T1 signal mostly along the outer margins of the bone with more diffuse surrounding marrow edema/T2 hyperintensity. Constellation of findings compatible with acute on chronic osteomyelitis. Associated overlying extensive soft tissue abnormality with poorly defined ulcerative defect, diffuse soft tissue thickening with low T1 and intermediate/increased fluid signal extending to the bone. ANKLE LIGAMENTS AND TENDONS: No acute ligamentous pathology. There is a chronic high-grade, near full-thickness tear of the Achilles tendon, just proximal to its attachment on the posterior calcaneus. Fluid-filled gap spans approximately 2.8 cm proximal distal and sagittal series image 12 with few residual insertional fibers noted. Tendinosis and a longitudinal split tear of the peroneus brevis tendon noted. Medial lateral ankle ligaments are otherwise grossly intact. Extensor tendons also intact. Mild flexor houses longus tenosynovitis at the knot of Jeremias Boehringer, similar to prior. OTHER: Sinus tarsi: Preserved fat signal. Plantar fascia: Diffusely thickened and distorted, which appears partially disrupted secondary to poorly defined phlegmonous soft tissue edema and thickening throughout the posterior calcaneal fat pad. Overlying skin thickening suggesting cellulitis.  Regional soft tissues: No genital fluid collection to suggest abscess. Diffuse edema throughout the intrinsic foot musculature. _______________     1. Extensive cortical erosions with bony resorption and remodeling and associated marrow signal abnormality throughout the posterior calcaneus demonstrating significant interval progression since January 2020 compatible with acute on chronic osteomyelitis. 2.  Associated overlying soft tissue ulceration and phlegmonous changes/granulation tissue along the plantar soft tissues of the hindfoot suggesting cellulitis with associated chronic disruption of the plantar fascia. No drainable abscess. 3.  High-grade partial/near complete tear of the distal ACL. 4.  Peroneus brevis tendinosis with a longitudinal split tear. Initial preliminary report was provided to the ED by the on-call [facility] teleradiology service. CT HEAD WO CONT    Result Date: 8/29/2021  EXAM: CT head INDICATION: Aphasia. COMPARISON: August 26, 2021. TECHNIQUE: Axial CT imaging of the head was performed without intravenous contrast. Dose reduction techniques used: automated exposure control, adjustment of the mAs and/or kVp according to patient size, and iterative reconstruction techniques. Digital imaging and communications in Medicine (DICOM) format image data are available to nonaffiliated external healthcare facilities or entities on a secure, media free, reciprocally searchable basis with patient authorization for at least 12 months after this study. _______________ FINDINGS: BRAIN AND POSTERIOR FOSSA: The sulci, folia, ventricles and basal cisterns are within normal limits for the patient's age. There is no intracranial hemorrhage, mass effect, or midline shift. There are no areas of abnormal parenchymal attenuation. EXTRA-AXIAL SPACES AND MENINGES: There are no abnormal extra-axial fluid collections. CALVARIUM: Intact. SINUSES: Clear. OTHER: None. _______________     No acute intracranial abnormalities.  Findings were discussed with patient's care provider shortly after study performed at approximately 12:13 a.m. August 29, 2021. CT HEAD WO CONT    Result Date: 8/26/2021  EXAM: CT HEAD WO CONT CLINICAL INDICATION/HISTORY: altered with HTN, low BP -Additional: None COMPARISON: 3/2/2018 TECHNIQUE: Axial CT imaging of the head was performed without intravenous contrast. One or more dose reduction techniques were used on this CT: automated exposure control, adjustment of the mAs and/or kVp according to patient size, and iterative reconstruction techniques. The specific techniques used on this CT exam have been documented in the patient's electronic medical record. Digital Imaging and Communications in Medicine (DICOM) format image data are available to nonaffiliated external healthcare facilities or entities on a secure, media free, reciprocally searchable basis with patient authorization for at least a 12-month period after this study. _______________ FINDINGS: BRAIN:   > Brain volume: Age appropriate.   > White matter: Little or no white matter disease. > Infarcts, encephalomalacia: None.   > Parenchymal mass: None.   > Parenchymal hemorrhage: None.   > Midline shift: None.   > Miscellaneous: None. EXTRA-AXIAL SPACES: Unremarkable. No fluid collections. CALVARIUM: Intact. SINUSES, MASTOIDS: Clear. OTHER EXTRACRANIAL: Unremarkable. _______________     1. No CT findings of an acute intracranial abnormality. Please note that noncontrast head CT may be normal in early acute infarct. CT SPINE CERV WO CONT    Result Date: 8/26/2021  EXAM: CT CERVICAL SPINE HISTORY: trauma, fall COMPARISON: none TECHNIQUE: Noncontrast axial CT scan of the cervical spine, utilizing standard departmental technique. Axial and coronal reformations were also provided for improved anatomic evaluation.  One or more dose reduction techniques were used on this CT: automated exposure control, adjustment of the mAs and/or kVp according to patient size, and iterative reconstruction techniques. The specific techniques used on this CT exam have been documented in the patient's electronic medical record. Digital Imaging and Communications in Medicine (DICOM) format image data are available to nonaffiliated external healthcare facilities or entities on a secure, media free, reciprocally searchable basis with patient authorization for at least a 12-month period after this study. ______________________ FINDINGS: VERTEBRAE AND DISCS: Vertebral alignment and articulation are within normal limits. Vertebral body and disc space heights are maintained. Specifically, no compression deformities are noted and there is no spondylolisthesis. No displaced fractures are identified. There are no significant areas of bone lucency or sclerosis. SPINAL CANAL AND FORAMINA: Patent without significant bony canal or foramina encroachment. PREVERTEBRAL SOFT TISSUES: Normal. VISIBLE PORTIONS OF POSTERIOR FOSSA/BRAIN: Normal. LUNG APICES: Clear. OTHER: None. __________________    1. No fracture or dislocation. Please note that this CT was performed supine. It is highly sensitive for fractures and dislocations but does not evaluate for ligamentous injury including significant instability. If the patient's symptoms persist or if otherwise clinically indicated, in erect plain film evaluation of the cervical spine is suggested. XR CHEST PORT    Result Date: 8/27/2021  EXAM: CHEST RADIOGRAPH, SINGLE VIEW CLINICAL INDICATION/HISTORY: Central line placement COMPARISON: 8/26/2021 TECHNIQUE: Portable frontal view of the chest was obtained. _______________ FINDINGS: SUPPORT DEVICES: Interval placement of right subclavian central venous catheter with tip overlying distal SVC. HEART AND MEDIASTINUM: Cardiomediastinal silhouette appears unchanged, mildly enlarged. Tortuous and atherosclerotic thoracic aorta. Mild central vascular congestion.  LUNGS AND PLEURAL SPACES: I will diffusely increased interstitial markings. No confluent airspace opacity. No pleural effusion or pneumothorax. BONY THORAX AND SOFT TISSUES: No acute osseous abnormality. _______________     1. Adequate right subclavian central venous catheter placement with no postprocedural complication. 2. Cardiomegaly with central vascular congestion and interstitial pulmonary edema, unchanged. XR CHEST PORT    Result Date: 8/26/2021  EXAM: XR CHEST PORT CLINICAL INDICATION/HISTORY: altered -Additional: Dialysis COMPARISON: 8/20/2021, 12/22/2020 TECHNIQUE: Frontal view of the chest _______________ FINDINGS: HEART AND MEDIASTINUM: Midline prominent cardiac silhouette, overall stable appearance to prior exam. Stable mediastinal contours. Bilateral hilar vascular congestion is present. LUNGS AND PLEURAL SPACES: Mild diffuse bilateral hazy indistinct interstitial markings with perihilar to lower lung predominance. No consolidation, pneumothorax or significant pleural effusion. BONY THORAX AND SOFT TISSUES: Chronic old healed right posterior seventh rib fracture. _______________     1. Plain film findings suggestive of edema edema/atelectasis, favoring fluid overload over CHF given the patient's of dialysis history. XR CHEST PORT    Result Date: 8/20/2021  EXAM: XR CHEST PORT CLINICAL INDICATION/HISTORY: dialysis -Additional: None COMPARISON: 12/22/2020 TECHNIQUE: Portable frontal view of the chest _______________ FINDINGS: SUPPORT DEVICES: None. HEART AND MEDIASTINUM: Cardiomediastinal silhouette within normal limits. LUNGS AND PLEURAL SPACES: No dense consolidation, large effusion or pneumothorax. _______________     No acute cardiopulmonary abnormality. ECHO ADULT COMPLETE    Result Date: 8/27/2021  · Echo study was limited due to patient's tolerance. · LV: Estimated LVEF is 50 - 55%. Normal cavity size. Moderate concentric hypertrophy. Low normal systolic function. Unable to assess diastolic function due to elevated heart rate.  · LA: Mildly dilated left atrium. · RV: Not well visualized. Normal global systolic function. · MV: Mitral valve non-specific thickening. Mitral valve leaflet calcification without reduced excursion. · PA: Mild pulmonary hypertension. Pulmonary arterial systolic pressure is 33 mmHg. ASSESSMENT/IMPRESSION:  Toxic/metabolic encephalopathy and/or a stroke resulting in aphasia. Patient needs full stroke work-up. Other possible diagnoses would include hypertensive encephalopathy or hypoglycemic encephalopathy. RECOMMENDATIONS:  1. Aspirin 325mg po (given), and Aspirin 81mg po daily afterwards. 2. Telemetry monitoring  3. Neuro checks per stroke protocol  4. Permissive hypertension (do not treat if BP is not >200/110, prefer prn labetolol 5mg)  5. IV normal saline or oral liquids for hydration. 6. Euglycemia with sliding scale insulin  7. Euthermia with acetaminophen 650mg Q6h prn for fever  8. MRI without contrast  9. CTA of head and neck report pending. 10. Lipid panel and hemoglobin A1c  11. SCDs and DVT prophylaxis   12. PT/OT and SLP consults    I will follow the patient.  Please do not hesitate to return with any questions.    ------------------------------------  Fransisco Reno MD  8/29/2021  10:24 AM

## 2021-08-29 NOTE — PROGRESS NOTES
0030 Assumed care from VIOLETA Andrews RN.    3801 Bedside and Verbal shift change report given to Sara Dai RN (oncoming nurse) by Sharene Galeazzi, RN   (offgoing nurse). Report included the following information SBAR, Kardex, ED Summary, Procedure Summary, Intake/Output, MAR, Recent Results and Med Rec Status.

## 2021-08-29 NOTE — PROGRESS NOTES
Multiple system delays to upload CTA results for teleneuro and radiology. Contacted by both radiology and teleneuro that no large vessel occlusion was present on CTA head/neck. I have ordered labs to evaluate for encephalopathy and also a brain MRI to check for a small stroke.  mg x1 given at the recommendation of teleneuro. Antihypertensives were held. I left a voicemail for his brother Mirella Peoples to update him of his change in clinical status.

## 2021-08-29 NOTE — PROGRESS NOTES
Responded to RRT, on assessment patient noted to have expressive aphasia, negative for other stroke s/sx. Code Stroke called. MD at bedside patient to CT. Pt returned to room,  mg ordered and given. Address patient's fears  re: stroke vs encephalopathy, pt verbalized understanading.   NAD

## 2021-08-29 NOTE — PROGRESS NOTES
During attempt for MRI, Mr Annie Thakur was kicking loose inside the MRI bore violently enough for injury and fall. He does not answer questions about his status nor foolow commands.

## 2021-08-29 NOTE — PROGRESS NOTES
His brother, Mariah Gray, returned my call. I updated him about the plan for workup for encephalopathy vs. stroke. The patient lives alone, so depending how he does, he may need case management involvement for rehab placement on discharge.

## 2021-08-29 NOTE — PROGRESS NOTES
Rapid response note/code S    Paged for rapid response at 0830. RN stated that last known normal was around shift change at 7 pm, but he was exhibiting aphasia at 2315 during his vitals check/medication. Tele-neurology consulted but he is outside the window for tPA. Head CT without contrast and CTA head/neck ordered.

## 2021-08-29 NOTE — PROGRESS NOTES
Problem: Falls - Risk of  Goal: *Absence of Falls  Description: Document Evelio Sites Fall Risk and appropriate interventions in the flowsheet. Outcome: Progressing Towards Goal  Note: Fall Risk Interventions:  Mobility Interventions: Bed/chair exit alarm, Communicate number of staff needed for ambulation/transfer, Patient to call before getting OOB, PT Consult for mobility concerns    Mentation Interventions: Adequate sleep, hydration, pain control, Bed/chair exit alarm, Door open when patient unattended, Evaluate medications/consider consulting pharmacy, More frequent rounding, Reorient patient, Room close to nurse's station, Toileting rounds, Update white board    Medication Interventions: Bed/chair exit alarm, Evaluate medications/consider consulting pharmacy, Patient to call before getting OOB, Teach patient to arise slowly    Elimination Interventions: Bed/chair exit alarm, Call light in reach, Patient to call for help with toileting needs, Stay With Me (per policy), Toilet paper/wipes in reach, Toileting schedule/hourly rounds, Urinal in reach              Problem: Risk for Spread of Infection  Goal: Prevent transmission of infectious organism to others  Description: Prevent the transmission of infectious organisms to other patients, staff members, and visitors. Outcome: Progressing Towards Goal     Problem: Chronic Renal Failure  Goal: *Fluid and electrolytes stabilized  Outcome: Progressing Towards Goal     Problem: Pressure Injury - Risk of  Goal: *Prevention of pressure injury  Description: Document Tucker Scale and appropriate interventions in the flowsheet.   Outcome: Progressing Towards Goal  Note: Pressure Injury Interventions:  Sensory Interventions: Assess changes in LOC, Check visual cues for pain, Float heels, Keep linens dry and wrinkle-free, Maintain/enhance activity level, Minimize linen layers, Monitor skin under medical devices, Pad between skin to skin, Pressure redistribution bed/mattress (bed type)         Activity Interventions: Pressure redistribution bed/mattress(bed type), PT/OT evaluation    Mobility Interventions: Float heels, HOB 30 degrees or less, Pressure redistribution bed/mattress (bed type), PT/OT evaluation    Nutrition Interventions: Document food/fluid/supplement intake, Discuss nutritional consult with provider, Offer support with meals,snacks and hydration    Friction and Shear Interventions: Apply protective barrier, creams and emollients, HOB 30 degrees or less, Lift sheet, Minimize layers

## 2021-08-30 LAB
ALBUMIN SERPL-MCNC: 3 G/DL (ref 3.4–5)
ALBUMIN/GLOB SERPL: 0.6 {RATIO} (ref 0.8–1.7)
ALP SERPL-CCNC: 73 U/L (ref 45–117)
ALT SERPL-CCNC: 14 U/L (ref 16–61)
ANION GAP SERPL CALC-SCNC: 14 MMOL/L (ref 3–18)
AST SERPL-CCNC: 7 U/L (ref 10–38)
BASOPHILS # BLD: 0 K/UL (ref 0–0.1)
BASOPHILS NFR BLD: 1 % (ref 0–2)
BILIRUB SERPL-MCNC: 0.5 MG/DL (ref 0.2–1)
BUN SERPL-MCNC: 73 MG/DL (ref 7–18)
BUN/CREAT SERPL: 5 (ref 12–20)
CALCIUM SERPL-MCNC: 7.9 MG/DL (ref 8.5–10.1)
CHLORIDE SERPL-SCNC: 103 MMOL/L (ref 100–111)
CHOLEST SERPL-MCNC: 146 MG/DL
CO2 SERPL-SCNC: 22 MMOL/L (ref 21–32)
CREAT SERPL-MCNC: 14 MG/DL (ref 0.6–1.3)
DIFFERENTIAL METHOD BLD: ABNORMAL
EOSINOPHIL # BLD: 0.2 K/UL (ref 0–0.4)
EOSINOPHIL NFR BLD: 5 % (ref 0–5)
ERYTHROCYTE [DISTWIDTH] IN BLOOD BY AUTOMATED COUNT: 19.9 % (ref 11.6–14.5)
GLOBULIN SER CALC-MCNC: 5 G/DL (ref 2–4)
GLUCOSE BLD STRIP.AUTO-MCNC: 100 MG/DL (ref 70–110)
GLUCOSE BLD STRIP.AUTO-MCNC: 111 MG/DL (ref 70–110)
GLUCOSE BLD STRIP.AUTO-MCNC: 140 MG/DL (ref 70–110)
GLUCOSE BLD STRIP.AUTO-MCNC: 68 MG/DL (ref 70–110)
GLUCOSE BLD STRIP.AUTO-MCNC: 69 MG/DL (ref 70–110)
GLUCOSE BLD STRIP.AUTO-MCNC: 83 MG/DL (ref 70–110)
GLUCOSE BLD STRIP.AUTO-MCNC: 85 MG/DL (ref 70–110)
GLUCOSE SERPL-MCNC: 74 MG/DL (ref 74–99)
HBA1C MFR BLD: 4.8 % (ref 4.2–5.6)
HCT VFR BLD AUTO: 32.3 % (ref 36–48)
HDLC SERPL-MCNC: 45 MG/DL (ref 40–60)
HDLC SERPL: 3.2 {RATIO} (ref 0–5)
HGB BLD-MCNC: 11.2 G/DL (ref 13–16)
LDLC SERPL CALC-MCNC: 81 MG/DL (ref 0–100)
LEFT DIST ATA VELOCITY: 114.7 CM/S
LEFT DIST PTA PSV: 114.7 CM/S
LEFT PERONEAL MID SYS PSV: 62.6 CM/S
LEFT POP A PROX VEL RATIO: 1
LEFT POPLITEAL DIST SYS PSV: 127.6 CM/S
LEFT POPLITEAL PROX SYS PSV: 135.3 CM/S
LEFT PTA MID SYS PSV: 86.3 CM/S
LEFT SUPER FEMORAL DIST SYS PSV: 135.3 CM/S
LEFT SUPER FEMORAL PROX SYS PSV: 213.2 CM/S
LIPID PROFILE,FLP: NORMAL
LYMPHOCYTES # BLD: 0.6 K/UL (ref 0.9–3.6)
LYMPHOCYTES NFR BLD: 12 % (ref 21–52)
MAGNESIUM SERPL-MCNC: 2.4 MG/DL (ref 1.6–2.6)
MCH RBC QN AUTO: 26 PG (ref 24–34)
MCHC RBC AUTO-ENTMCNC: 34.7 G/DL (ref 31–37)
MCV RBC AUTO: 75.1 FL (ref 78–100)
MONOCYTES # BLD: 1.1 K/UL (ref 0.05–1.2)
MONOCYTES NFR BLD: 22 % (ref 3–10)
NEUTS SEG # BLD: 3.2 K/UL (ref 1.8–8)
NEUTS SEG NFR BLD: 61 % (ref 40–73)
PHOSPHATE SERPL-MCNC: 8 MG/DL (ref 2.5–4.9)
PLATELET # BLD AUTO: 157 K/UL (ref 135–420)
POTASSIUM SERPL-SCNC: 4.5 MMOL/L (ref 3.5–5.5)
PROT SERPL-MCNC: 8 G/DL (ref 6.4–8.2)
RBC # BLD AUTO: 4.3 M/UL (ref 4.35–5.65)
SODIUM SERPL-SCNC: 139 MMOL/L (ref 136–145)
TRIGL SERPL-MCNC: 100 MG/DL (ref ?–150)
VLDLC SERPL CALC-MCNC: 20 MG/DL
WBC # BLD AUTO: 5.2 K/UL (ref 4.6–13.2)

## 2021-08-30 PROCEDURE — 74011000258 HC RX REV CODE- 258: Performed by: FAMILY MEDICINE

## 2021-08-30 PROCEDURE — 74011000250 HC RX REV CODE- 250: Performed by: INTERNAL MEDICINE

## 2021-08-30 PROCEDURE — 36591 DRAW BLOOD OFF VENOUS DEVICE: CPT

## 2021-08-30 PROCEDURE — 83735 ASSAY OF MAGNESIUM: CPT

## 2021-08-30 PROCEDURE — 74011000250 HC RX REV CODE- 250: Performed by: FAMILY MEDICINE

## 2021-08-30 PROCEDURE — 82962 GLUCOSE BLOOD TEST: CPT

## 2021-08-30 PROCEDURE — 65660000000 HC RM CCU STEPDOWN

## 2021-08-30 PROCEDURE — C9113 INJ PANTOPRAZOLE SODIUM, VIA: HCPCS | Performed by: INTERNAL MEDICINE

## 2021-08-30 PROCEDURE — 80053 COMPREHEN METABOLIC PANEL: CPT

## 2021-08-30 PROCEDURE — 74011000250 HC RX REV CODE- 250

## 2021-08-30 PROCEDURE — 84100 ASSAY OF PHOSPHORUS: CPT

## 2021-08-30 PROCEDURE — 74011250636 HC RX REV CODE- 250/636: Performed by: STUDENT IN AN ORGANIZED HEALTH CARE EDUCATION/TRAINING PROGRAM

## 2021-08-30 PROCEDURE — 90935 HEMODIALYSIS ONE EVALUATION: CPT

## 2021-08-30 PROCEDURE — 85025 COMPLETE CBC W/AUTO DIFF WBC: CPT

## 2021-08-30 PROCEDURE — 74011250636 HC RX REV CODE- 250/636

## 2021-08-30 PROCEDURE — 74011250636 HC RX REV CODE- 250/636: Performed by: INTERNAL MEDICINE

## 2021-08-30 PROCEDURE — 80061 LIPID PANEL: CPT

## 2021-08-30 RX ORDER — LORAZEPAM 2 MG/ML
INJECTION INTRAMUSCULAR
Status: COMPLETED
Start: 2021-08-30 | End: 2021-08-30

## 2021-08-30 RX ORDER — MAGNESIUM SULFATE 100 %
4 CRYSTALS MISCELLANEOUS AS NEEDED
Status: DISCONTINUED | OUTPATIENT
Start: 2021-08-30 | End: 2021-09-06 | Stop reason: HOSPADM

## 2021-08-30 RX ORDER — DEXTROSE 50 % IN WATER (D50W) INTRAVENOUS SYRINGE
25 ONCE
Status: COMPLETED | OUTPATIENT
Start: 2021-08-30 | End: 2021-08-30

## 2021-08-30 RX ORDER — LORAZEPAM 2 MG/ML
0.5 INJECTION INTRAMUSCULAR ONCE
Status: COMPLETED | OUTPATIENT
Start: 2021-08-30 | End: 2021-08-30

## 2021-08-30 RX ORDER — DEXTROSE MONOHYDRATE AND SODIUM CHLORIDE 5; .9 G/100ML; G/100ML
50 INJECTION, SOLUTION INTRAVENOUS CONTINUOUS
Status: DISCONTINUED | OUTPATIENT
Start: 2021-08-30 | End: 2021-08-31

## 2021-08-30 RX ORDER — DEXTROSE 50 % IN WATER (D50W) INTRAVENOUS SYRINGE
Status: COMPLETED
Start: 2021-08-30 | End: 2021-08-30

## 2021-08-30 RX ORDER — DEXTROSE 50 % IN WATER (D50W) INTRAVENOUS SYRINGE
25-50 AS NEEDED
Status: DISCONTINUED | OUTPATIENT
Start: 2021-08-30 | End: 2021-09-06 | Stop reason: HOSPADM

## 2021-08-30 RX ADMIN — PANTOPRAZOLE SODIUM 40 MG: 40 INJECTION, POWDER, FOR SOLUTION INTRAVENOUS at 04:23

## 2021-08-30 RX ADMIN — DEXTROSE 50 % IN WATER (D50W) INTRAVENOUS SYRINGE 25 G: at 11:35

## 2021-08-30 RX ADMIN — LORAZEPAM 0.5 MG: 2 INJECTION INTRAMUSCULAR; INTRAVENOUS at 11:36

## 2021-08-30 RX ADMIN — LORAZEPAM 0.5 MG: 2 INJECTION INTRAMUSCULAR at 11:36

## 2021-08-30 RX ADMIN — DEXTROSE MONOHYDRATE 12.5 G: 25 INJECTION, SOLUTION INTRAVENOUS at 21:26

## 2021-08-30 RX ADMIN — WATER 1 G: 1 INJECTION INTRAMUSCULAR; INTRAVENOUS; SUBCUTANEOUS at 11:00

## 2021-08-30 RX ADMIN — DEXTROSE MONOHYDRATE 25 G: 25 INJECTION, SOLUTION INTRAVENOUS at 11:35

## 2021-08-30 RX ADMIN — DEXTROSE MONOHYDRATE AND SODIUM CHLORIDE 50 ML/HR: 5; .9 INJECTION, SOLUTION INTRAVENOUS at 21:32

## 2021-08-30 RX ADMIN — PARICALCITOL 4 MCG: 5 INJECTION, SOLUTION INTRAVENOUS at 13:18

## 2021-08-30 RX ADMIN — WATER 1 G: 1 INJECTION INTRAMUSCULAR; INTRAVENOUS; SUBCUTANEOUS at 04:23

## 2021-08-30 NOTE — PROGRESS NOTES
Problem: Falls - Risk of  Goal: *Absence of Falls  Description: Document Mirlande Finch Fall Risk and appropriate interventions in the flowsheet. Outcome: Progressing Towards Goal  Note: Fall Risk Interventions:  Mobility Interventions: Bed/chair exit alarm, Communicate number of staff needed for ambulation/transfer, Mechanical lift, OT consult for ADLs, Patient to call before getting OOB, PT Consult for mobility concerns, PT Consult for assist device competence, Strengthening exercises (ROM-active/passive)    Mentation Interventions: Adequate sleep, hydration, pain control, Bed/chair exit alarm, Evaluate medications/consider consulting pharmacy, Eyeglasses and hearing aids, Familiar objects from home, Family/sitter at bedside, Increase mobility, More frequent rounding, Reorient patient, Toileting rounds, Update white board    Medication Interventions: Bed/chair exit alarm, Evaluate medications/consider consulting pharmacy, Patient to call before getting OOB, Teach patient to arise slowly    Elimination Interventions: Bed/chair exit alarm, Call light in reach, Elevated toilet seat, Patient to call for help with toileting needs, Stay With Me (per policy), Toilet paper/wipes in reach, Toileting schedule/hourly rounds              Problem: Patient Education: Go to Patient Education Activity  Goal: Patient/Family Education  Outcome: Progressing Towards Goal     Problem: Risk for Spread of Infection  Goal: Prevent transmission of infectious organism to others  Description: Prevent the transmission of infectious organisms to other patients, staff members, and visitors. Outcome: Progressing Towards Goal     Problem: Risk for Spread of Infection  Goal: Prevent transmission of infectious organism to others  Description: Prevent the transmission of infectious organisms to other patients, staff members, and visitors.   Outcome: Progressing Towards Goal     Problem: Patient Education:  Go to Education Activity  Goal: Patient/Family Education  Outcome: Progressing Towards Goal     Problem: Chronic Renal Failure  Goal: *Fluid and electrolytes stabilized  Outcome: Progressing Towards Goal     Problem: Patient Education: Go to Patient Education Activity  Goal: Patient/Family Education  Outcome: Progressing Towards Goal     Problem: Pressure Injury - Risk of  Goal: *Prevention of pressure injury  Description: Document Tucker Scale and appropriate interventions in the flowsheet. Outcome: Progressing Towards Goal  Note: Pressure Injury Interventions:  Sensory Interventions: Assess changes in LOC, Keep linens dry and wrinkle-free, Minimize linen layers, Pressure redistribution bed/mattress (bed type)         Activity Interventions: Assess need for specialty bed, Chair cushion, Increase time out of bed, Pressure redistribution bed/mattress(bed type), PT/OT evaluation    Mobility Interventions: Assess need for specialty bed, Chair cushion, Float heels, Pressure redistribution bed/mattress (bed type), PT/OT evaluation, Turn and reposition approx.  every two hours(pillow and wedges)    Nutrition Interventions: Document food/fluid/supplement intake, Discuss nutritional consult with provider, Offer support with meals,snacks and hydration    Friction and Shear Interventions: Feet elevated on foot rest, Foam dressings/transparent film/skin sealants, Lift sheet, Lift team/patient mobility team, Minimize layers, Transferring/repositioning devices                Problem: Patient Education: Go to Patient Education Activity  Goal: Patient/Family Education  Outcome: Progressing Towards Goal

## 2021-08-30 NOTE — PROGRESS NOTES
Problem: Falls - Risk of  Goal: *Absence of Falls  Description: Document Yaz Fulton Fall Risk and appropriate interventions in the flowsheet. Outcome: Progressing Towards Goal  Note: Fall Risk Interventions:  Mobility Interventions: Bed/chair exit alarm    Mentation Interventions: Adequate sleep, hydration, pain control, Bed/chair exit alarm, Door open when patient unattended, Family/sitter at bedside    Medication Interventions: Bed/chair exit alarm    Elimination Interventions: Bed/chair exit alarm, Call light in reach, Patient to call for help with toileting needs              Problem: Patient Education: Go to Patient Education Activity  Goal: Patient/Family Education  Outcome: Progressing Towards Goal     Problem: Risk for Spread of Infection  Goal: Prevent transmission of infectious organism to others  Description: Prevent the transmission of infectious organisms to other patients, staff members, and visitors. Outcome: Progressing Towards Goal     Problem: Patient Education:  Go to Education Activity  Goal: Patient/Family Education  Outcome: Progressing Towards Goal     Problem: Chronic Renal Failure  Goal: *Fluid and electrolytes stabilized  Outcome: Progressing Towards Goal     Problem: Patient Education: Go to Patient Education Activity  Goal: Patient/Family Education  Outcome: Progressing Towards Goal     Problem: Pressure Injury - Risk of  Goal: *Prevention of pressure injury  Description: Document Tucker Scale and appropriate interventions in the flowsheet. Outcome: Progressing Towards Goal  Note: Pressure Injury Interventions:  Sensory Interventions: Assess changes in LOC, Keep linens dry and wrinkle-free, Minimize linen layers, Pressure redistribution bed/mattress (bed type)         Activity Interventions: Pressure redistribution bed/mattress(bed type)    Mobility Interventions: Pressure redistribution bed/mattress (bed type), Turn and reposition approx.  every two hours(pillow and wedges)    Nutrition Interventions: Document food/fluid/supplement intake    Friction and Shear Interventions: Apply protective barrier, creams and emollients, Foam dressings/transparent film/skin sealants                Problem: Patient Education: Go to Patient Education Activity  Goal: Patient/Family Education  Outcome: Progressing Towards Goal

## 2021-08-30 NOTE — PROGRESS NOTES
NEUROLOGY PROGRESS NOTE        Patient: Rubio Yi. Sex: male          DOA: 8/26/2021  YOB: 1970      Age:  54 y.o.         LOS: 3 days     Identification:  54 y.o. male with history of DM, CHF, HTN, Sickle cell trait, CKD on HD, who presented with remarkable confusion in the setting of missing dialysis, hypoglycemia and intermittent diarrhea. SUBJECTIVE:   The patient is remarkably confused and has ongoing asterixis/myoclonic jerking movements. Stroke Work-up:  Brain MRI:  CT Head 8/29: No acute intracranial abnormalities. Findings were discussed with patient's care provider shortly after study performed at approximately 12:13 a.m. August 29, 2021. CTA of head and neck: 1. No definite large vessel occlusion. 2.  No hemodynamically significant ICA stenosis, based on NASCET criteria. 3. Moderate to severe stenosis involving right vertebral artery origin. 4. No high-grade intracranial stenosis. Mild atherosclerotic narrowing of the bilateral carotid siphons. Echocardiogram:  Echo study was limited due to patient's tolerance. · LV: Estimated LVEF is 50 - 55%. Normal cavity size. Moderate concentric hypertrophy. Low normal systolic function. Unable to assess diastolic function due to elevated heart rate. · LA: Mildly dilated left atrium. · RV: Not well visualized. Normal global systolic function. · MV: Mitral valve non-specific thickening. Mitral valve leaflet calcification without reduced excursion. · PA: Mild pulmonary hypertension. Pulmonary arterial systolic pressure is 33 mmHg. Lipid panel: No results found for: CHOL, CHOLPOCT, CHOLX, CHLST, CHOLV, [id number], HDL, HDLP, LDL, LDLC, DLDLP, [id number], VLDLC, VLDL, TGLX, TRIGL, TRIGP, TGLPOCT, CHHD, CHHDX  HbA1c:   Lab Results   Component Value Date/Time    Hemoglobin A1c 4.8 08/29/2021 01:02 AM     REVIEW OF SYSTEMS: Unable to review. The patient is confused remarkably.     OBJECTIVE:      Visit Vitals  BP (!) 94/48   Pulse 89 Temp 97.6 °F (36.4 °C)   Resp 16   Ht 5' 7\" (1.702 m)   Wt 78.9 kg (174 lb)   SpO2 96%   BMI 27.25 kg/m²     Physical Exam:  GEN: Alert, confused. EYES: conjunctiva normal, lids with out lesions  EXTREMITIES: No edema cyanosis. Right BKA. SKIN: no rashes or skin breakdown, no nodules  NEURO: Alert. The patient is confused. He is not following comments. He has ongoing myoclonic jerking movements. He is not verbal. He withdraws to noxious July on all four. His face is symmetric.     Current Facility-Administered Medications   Medication Dose Route Frequency    aspirin delayed-release tablet 81 mg  81 mg Oral DAILY    labetaloL (NORMODYNE;TRANDATE) 20 mg/4 mL (5 mg/mL) injection 10 mg  10 mg IntraVENous Q6H PRN    paricalcitoL (ZEMPLAR) injection 4 mcg  4 mcg IntraVENous DIALYSIS MON, WED & FRI    acetaminophen (TYLENOL) tablet 650 mg  650 mg Oral Q6H PRN    Or    acetaminophen (TYLENOL) suppository 650 mg  650 mg Rectal Q6H PRN    polyethylene glycol (MIRALAX) packet 17 g  17 g Oral DAILY PRN    [Held by provider] heparin (porcine) injection 5,000 Units  5,000 Units SubCUTAneous Q8H    promethazine (PHENERGAN) tablet 12.5 mg  12.5 mg Oral Q6H PRN    Or    ondansetron (ZOFRAN) injection 4 mg  4 mg IntraVENous Q6H PRN    pantoprazole (PROTONIX) injection 40 mg  40 mg IntraVENous Q24H    cefepime (MAXIPIME) 1 g in sterile water (preservative free) 10 mL IV syringe  1 g IntraVENous Q8H    levoFLOXacin (LEVAQUIN) 500 mg in D5W IVPB  500 mg IntraVENous Q48H    insulin lispro (HUMALOG) injection   SubCUTAneous Q4H    [Held by provider] amLODIPine (NORVASC) tablet 10 mg  10 mg Oral DAILY    [Held by provider] carvediloL (COREG) tablet 25 mg  25 mg Oral BID WITH MEALS    [Held by provider] isosorbide mononitrate ER (IMDUR) tablet 60 mg  60 mg Oral DAILY    [Held by provider] hydrALAZINE (APRESOLINE) tablet 50 mg  50 mg Oral TID    sevelamer carbonate (RENVELA) tab 1,600 mg  1,600 mg Oral TID WITH MEALS    heparin (porcine) 1,000 unit/mL injection 2,000 Units  2,000 Units Hemodialysis DIALYSIS PRN       Laboratory  Recent Results (from the past 24 hour(s))   GLUCOSE, POC    Collection Time: 08/29/21  3:28 PM   Result Value Ref Range    Glucose (POC) 91 70 - 110 mg/dL   GLUCOSE, POC    Collection Time: 08/29/21  8:06 PM   Result Value Ref Range    Glucose (POC) 82 70 - 110 mg/dL   GLUCOSE, POC    Collection Time: 08/30/21 12:03 AM   Result Value Ref Range    Glucose (POC) 100 70 - 110 mg/dL   MAGNESIUM    Collection Time: 08/30/21  2:50 AM   Result Value Ref Range    Magnesium 2.4 1.6 - 2.6 mg/dL   PHOSPHORUS    Collection Time: 08/30/21  2:50 AM   Result Value Ref Range    Phosphorus 8.0 (H) 2.5 - 4.9 MG/DL   CBC WITH AUTOMATED DIFF    Collection Time: 08/30/21  2:50 AM   Result Value Ref Range    WBC 5.2 4.6 - 13.2 K/uL    RBC 4.30 (L) 4.35 - 5.65 M/uL    HGB 11.2 (L) 13.0 - 16.0 g/dL    HCT 32.3 (L) 36.0 - 48.0 %    MCV 75.1 (L) 78.0 - 100.0 FL    MCH 26.0 24.0 - 34.0 PG    MCHC 34.7 31.0 - 37.0 g/dL    RDW 19.9 (H) 11.6 - 14.5 %    PLATELET 776 501 - 479 K/uL    NEUTROPHILS 61 40 - 73 %    LYMPHOCYTES 12 (L) 21 - 52 %    MONOCYTES 22 (H) 3 - 10 %    EOSINOPHILS 5 0 - 5 %    BASOPHILS 1 0 - 2 %    ABS. NEUTROPHILS 3.2 1.8 - 8.0 K/UL    ABS. LYMPHOCYTES 0.6 (L) 0.9 - 3.6 K/UL    ABS. MONOCYTES 1.1 0.05 - 1.2 K/UL    ABS. EOSINOPHILS 0.2 0.0 - 0.4 K/UL    ABS.  BASOPHILS 0.0 0.0 - 0.1 K/UL    DF AUTOMATED     METABOLIC PANEL, COMPREHENSIVE    Collection Time: 08/30/21  2:50 AM   Result Value Ref Range    Sodium 139 136 - 145 mmol/L    Potassium 4.5 3.5 - 5.5 mmol/L    Chloride 103 100 - 111 mmol/L    CO2 22 21 - 32 mmol/L    Anion gap 14 3.0 - 18 mmol/L    Glucose 74 74 - 99 mg/dL    BUN 73 (H) 7.0 - 18 MG/DL    Creatinine 14.00 (H) 0.6 - 1.3 MG/DL    BUN/Creatinine ratio 5 (L) 12 - 20      GFR est AA 5 (L) >60 ml/min/1.73m2    GFR est non-AA 4 (L) >60 ml/min/1.73m2    Calcium 7.9 (L) 8.5 - 10.1 MG/DL    Bilirubin, total 0.5 0.2 - 1.0 MG/DL    ALT (SGPT) 14 (L) 16 - 61 U/L    AST (SGOT) 7 (L) 10 - 38 U/L    Alk. phosphatase 73 45 - 117 U/L    Protein, total 8.0 6.4 - 8.2 g/dL    Albumin 3.0 (L) 3.4 - 5.0 g/dL    Globulin 5.0 (H) 2.0 - 4.0 g/dL    A-G Ratio 0.6 (L) 0.8 - 1.7     GLUCOSE, POC    Collection Time: 08/30/21  4:29 AM   Result Value Ref Range    Glucose (POC) 85 70 - 110 mg/dL   GLUCOSE, POC    Collection Time: 08/30/21 11:05 AM   Result Value Ref Range    Glucose (POC) 68 (L) 70 - 110 mg/dL   GLUCOSE, POC    Collection Time: 08/30/21 12:01 PM   Result Value Ref Range    Glucose (POC) 140 (H) 70 - 110 mg/dL       Radiology:  XR FOOT LT MIN 3 V    Result Date: 8/27/2021  EXAM: XR FOOT LT MIN 3 V CLINICAL INDICATION/HISTORY: Left foot pain and erythema, evaluate for foreign body, osteomyelitis  COMPARISON: None. TECHNIQUE: AP, lateral, and oblique views of the left foot were obtained. _______________ FINDINGS: Soft tissue irregularity throughout mid and posterior aspects of the plantar left foot with diffuse soft tissue thickening. Prominent atherosclerotic vascular calcification. Diffuse, heterogeneous sclerosis throughout the calcaneus. Currently there is no associated cortical defect or periosteal reaction to indicate an acute component of osteomyelitis. No radiodense foreign body. Well-corticated osseous fragment involving distal Achilles tendon insertion, are listed from the posterior calcaneus. Pes planus morphology. Surgical changes suggestive of previous mid diaphyseal second metatarsal amputation with adjacent curvilinear heterotopic ossification. _______________     Cellulitis and ulceration involving left hindfoot with findings suggestive of chronic osteomyelitis involving the calcaneus. No radiographic evidence of active osteomyelitis within the left foot.      XR ABD (KUB)    Result Date: 8/20/2021  EXAM: SUPINE ABDOMINAL RADIOGRAPH CLINICAL INDICATION/HISTORY: diarrhea -Additional: None COMPARISON: 12/22/2020 TECHNIQUE: Single supine view of the abdomen. _______________ FINDINGS: BOWEL GAS PATTERN: No evidence of obstruction. No visible free air, given limitations of supine view. BONES: Unremarkable. OTHER: None. _______________     No significant abnormality. MRI ANKLE LT WO CONT    Result Date: 8/28/2021  EXAMINATION: MRI ANKLE LT WO CONT CLINICAL INDICATION/HISTORY: ulcer diabetic  >Additional: None TECHNIQUE: T1 weighted sagittal, STIR sagittal, proton FSE axial, fat suppressed T2 FSE axial, fat suppressed T2 FSE coronal, proton FSE oblique axial. COMPARISON EXAMS: January 29, 2020 _______________ FINDINGS: OSSEOUS: There are extensive osseous erosions an bony resorption throughout the posterior inferior calcaneus, markedly progressed in the interval since the prior study with associated bony remodeling and diffuse marrow edema throughout the posterior body. Diminished T1 signal mostly along the outer margins of the bone with more diffuse surrounding marrow edema/T2 hyperintensity. Constellation of findings compatible with acute on chronic osteomyelitis. Associated overlying extensive soft tissue abnormality with poorly defined ulcerative defect, diffuse soft tissue thickening with low T1 and intermediate/increased fluid signal extending to the bone. ANKLE LIGAMENTS AND TENDONS: No acute ligamentous pathology. There is a chronic high-grade, near full-thickness tear of the Achilles tendon, just proximal to its attachment on the posterior calcaneus. Fluid-filled gap spans approximately 2.8 cm proximal distal and sagittal series image 12 with few residual insertional fibers noted. Tendinosis and a longitudinal split tear of the peroneus brevis tendon noted. Medial lateral ankle ligaments are otherwise grossly intact. Extensor tendons also intact. Mild flexor houses longus tenosynovitis at the knot of [place], similar to prior.  OTHER: Sinus tarsi: Preserved fat signal. Plantar fascia: Diffusely thickened and distorted, which appears partially disrupted secondary to poorly defined phlegmonous soft tissue edema and thickening throughout the posterior calcaneal fat pad. Overlying skin thickening suggesting cellulitis. Regional soft tissues: No genital fluid collection to suggest abscess. Diffuse edema throughout the intrinsic foot musculature. _______________     1. Extensive cortical erosions with bony resorption and remodeling and associated marrow signal abnormality throughout the posterior calcaneus demonstrating significant interval progression since January 2020 compatible with acute on chronic osteomyelitis. 2.  Associated overlying soft tissue ulceration and phlegmonous changes/granulation tissue along the plantar soft tissues of the hindfoot suggesting cellulitis with associated chronic disruption of the plantar fascia. No drainable abscess. 3.  High-grade partial/near complete tear of the distal ACL. 4.  Peroneus brevis tendinosis with a longitudinal split tear. Initial preliminary report was provided to the ED by the on-call [facility] teleradiology service. CT HEAD WO CONT    Result Date: 8/29/2021  EXAM: CT head INDICATION: Aphasia. COMPARISON: August 26, 2021. TECHNIQUE: Axial CT imaging of the head was performed without intravenous contrast. Dose reduction techniques used: automated exposure control, adjustment of the mAs and/or kVp according to patient size, and iterative reconstruction techniques. Digital imaging and communications in Medicine (DICOM) format image data are available to nonaffiliated external healthcare facilities or entities on a secure, media free, reciprocally searchable basis with patient authorization for at least 12 months after this study. _______________ FINDINGS: BRAIN AND POSTERIOR FOSSA: The sulci, folia, ventricles and basal cisterns are within normal limits for the patient's age. There is no intracranial hemorrhage, mass effect, or midline shift.  There are no areas of abnormal parenchymal attenuation. EXTRA-AXIAL SPACES AND MENINGES: There are no abnormal extra-axial fluid collections. CALVARIUM: Intact. SINUSES: Clear. OTHER: None. _______________     No acute intracranial abnormalities. Findings were discussed with patient's care provider shortly after study performed at approximately 12:13 a.m. August 29, 2021. CT HEAD WO CONT    Result Date: 8/26/2021  EXAM: CT HEAD WO CONT CLINICAL INDICATION/HISTORY: altered with HTN, low BP -Additional: None COMPARISON: 3/2/2018 TECHNIQUE: Axial CT imaging of the head was performed without intravenous contrast. One or more dose reduction techniques were used on this CT: automated exposure control, adjustment of the mAs and/or kVp according to patient size, and iterative reconstruction techniques. The specific techniques used on this CT exam have been documented in the patient's electronic medical record. Digital Imaging and Communications in Medicine (DICOM) format image data are available to nonaffiliated external healthcare facilities or entities on a secure, media free, reciprocally searchable basis with patient authorization for at least a 12-month period after this study. _______________ FINDINGS: BRAIN:   > Brain volume: Age appropriate.   > White matter: Little or no white matter disease. > Infarcts, encephalomalacia: None.   > Parenchymal mass: None.   > Parenchymal hemorrhage: None.   > Midline shift: None.   > Miscellaneous: None. EXTRA-AXIAL SPACES: Unremarkable. No fluid collections. CALVARIUM: Intact. SINUSES, MASTOIDS: Clear. OTHER EXTRACRANIAL: Unremarkable. _______________     1. No CT findings of an acute intracranial abnormality. Please note that noncontrast head CT may be normal in early acute infarct.       CTA HEAD NECK W WO CONT    Result Date: 8/29/2021  EXAM: CTA HEAD AND NECK INDICATION: Aphasia COMPARISON: No prior studies TECHNIQUE:  Multiple axial CT images of the neck were obtained extending from the level of the aortic arch to the skull base after the administration of the IV contrast utilizing a CTA protocol. Maximum intensity projection reconstructions were performed in multiple planes. Multiple axial CT images of the head were obtained extending from below the level of the skull base to the vertex after the administration of the IV contrast utilizing a CTA protocol. Maximum intensity projection reconstructions were performed in three planes. Additional 3 D reconstructions were performed at a separate workstation. One or more dose reduction techniques were used on this CT: automated exposure control, adjustment of the mAs and/or kVp according to patient's size, and iterative reconstruction techniques. The specific techniques utilized on this CT exam have been documented in the patient's electronic medical record. Digital Imaging and Communications in Medicine (DICOM) format image data are available to nonaffiliated external healthcare facilities or entities on a secure, media free, reciprocally searchable basis with patient authorization for at least a 12-month period after this study. ___________________ FINDINGS: CTA NECK Normal variant branching pattern of great vessels along aortic arch is present with a conjoined origin of the innominate artery and left common carotid artery. Atherosclerotic calcifications are present along the aortic arch and the proximal great vessels without evidence high-grade stenosis. The left common carotid is slightly irregular. The left carotid bifurcation is slightly irregular. Estimated minimal luminal diameter proximal left ICA 8.2 mm. Estimated luminal diameter of normal left ICA cervical segment is 5.1 mm. Estimated degree of stenosis of the left ICA based upon NASCET criteria is 0%. Remainder of left ICA cervical segment is unremarkable. The right common carotid is slightly irregular. The right carotid bifurcation is mildly irregular.  Estimated minimal luminal diameter proximal right ICA 7.5 mm. Estimated luminal diameter of normal right ICA cervical segment is 5.3 mm. Estimated degree of stenosis of the right ICA based upon NASCET criteria is 0%. Remainder of right ICA cervical segment is unremarkable. Vertebral arteries are relatively equal in size. Moderate to severe stenosis of the right vertebral artery origin is present. Mild narrowing of the left vertebral artery origin is present. Degenerative changes are seen in the spine. Mild  mucoperiosteal thickening is scattered in the paranasal sinuses. No air-fluid levels are present. Visualized lung apices are clear. CTA HEAD There is no evidence of aneurysm. Mild atherosclerotic narrowing is seen in the bilateral carotid siphons without high grade stenosis. The carotid terminus is unremarkable. The anterior cerebral arteries are unremarkable. An anterior communicating artery is present. The middle cerebral artery bifurcations are unremarkable. The vertebral arteries are relatively equal in size. PICA origins are unremarkable. Tortuosity of the vertebral arteries and vertebral basilar junction is seen. No focal basilar artery stenosis is present. Left P1 segment is mildly hypoplastic with an infundibular type origin. Posterior cerebral arteries are otherwise unremarkable. A posterior communicating artery is bilaterally. The dural venous sinuses are patent. ___________________     1. No definite large vessel occlusion. 2.  No hemodynamically significant ICA stenosis, based on NASCET criteria. 3. Moderate to severe stenosis involving right vertebral artery origin. 4. No high-grade intracranial stenosis. Mild atherosclerotic narrowing of the bilateral carotid siphons. CT SPINE CERV WO CONT    Result Date: 8/26/2021  EXAM: CT CERVICAL SPINE HISTORY: trauma, fall COMPARISON: none TECHNIQUE: Noncontrast axial CT scan of the cervical spine, utilizing standard departmental technique.   Axial and coronal reformations were also provided for improved anatomic evaluation. One or more dose reduction techniques were used on this CT: automated exposure control, adjustment of the mAs and/or kVp according to patient size, and iterative reconstruction techniques. The specific techniques used on this CT exam have been documented in the patient's electronic medical record. Digital Imaging and Communications in Medicine (DICOM) format image data are available to nonaffiliated external healthcare facilities or entities on a secure, media free, reciprocally searchable basis with patient authorization for at least a 12-month period after this study. ______________________ FINDINGS: VERTEBRAE AND DISCS: Vertebral alignment and articulation are within normal limits. Vertebral body and disc space heights are maintained. Specifically, no compression deformities are noted and there is no spondylolisthesis. No displaced fractures are identified. There are no significant areas of bone lucency or sclerosis. SPINAL CANAL AND FORAMINA: Patent without significant bony canal or foramina encroachment. PREVERTEBRAL SOFT TISSUES: Normal. VISIBLE PORTIONS OF POSTERIOR FOSSA/BRAIN: Normal. LUNG APICES: Clear. OTHER: None. __________________    1. No fracture or dislocation. Please note that this CT was performed supine. It is highly sensitive for fractures and dislocations but does not evaluate for ligamentous injury including significant instability. If the patient's symptoms persist or if otherwise clinically indicated, in erect plain film evaluation of the cervical spine is suggested. XR CHEST PORT    Result Date: 8/27/2021  EXAM: CHEST RADIOGRAPH, SINGLE VIEW CLINICAL INDICATION/HISTORY: Central line placement COMPARISON: 8/26/2021 TECHNIQUE: Portable frontal view of the chest was obtained. _______________ FINDINGS: SUPPORT DEVICES: Interval placement of right subclavian central venous catheter with tip overlying distal SVC.  HEART AND MEDIASTINUM: Cardiomediastinal silhouette appears unchanged, mildly enlarged. Tortuous and atherosclerotic thoracic aorta. Mild central vascular congestion. LUNGS AND PLEURAL SPACES: I will diffusely increased interstitial markings. No confluent airspace opacity. No pleural effusion or pneumothorax. BONY THORAX AND SOFT TISSUES: No acute osseous abnormality. _______________     1. Adequate right subclavian central venous catheter placement with no postprocedural complication. 2. Cardiomegaly with central vascular congestion and interstitial pulmonary edema, unchanged. XR CHEST PORT    Result Date: 8/26/2021  EXAM: XR CHEST PORT CLINICAL INDICATION/HISTORY: altered -Additional: Dialysis COMPARISON: 8/20/2021, 12/22/2020 TECHNIQUE: Frontal view of the chest _______________ FINDINGS: HEART AND MEDIASTINUM: Midline prominent cardiac silhouette, overall stable appearance to prior exam. Stable mediastinal contours. Bilateral hilar vascular congestion is present. LUNGS AND PLEURAL SPACES: Mild diffuse bilateral hazy indistinct interstitial markings with perihilar to lower lung predominance. No consolidation, pneumothorax or significant pleural effusion. BONY THORAX AND SOFT TISSUES: Chronic old healed right posterior seventh rib fracture. _______________     1. Plain film findings suggestive of edema edema/atelectasis, favoring fluid overload over CHF given the patient's of dialysis history. XR CHEST PORT    Result Date: 8/20/2021  EXAM: XR CHEST PORT CLINICAL INDICATION/HISTORY: dialysis -Additional: None COMPARISON: 12/22/2020 TECHNIQUE: Portable frontal view of the chest _______________ FINDINGS: SUPPORT DEVICES: None. HEART AND MEDIASTINUM: Cardiomediastinal silhouette within normal limits. LUNGS AND PLEURAL SPACES: No dense consolidation, large effusion or pneumothorax. _______________     No acute cardiopulmonary abnormality.      ECHO ADULT COMPLETE    Result Date: 8/27/2021  · Echo study was limited due to patient's tolerance. · LV: Estimated LVEF is 50 - 55%. Normal cavity size. Moderate concentric hypertrophy. Low normal systolic function. Unable to assess diastolic function due to elevated heart rate. · LA: Mildly dilated left atrium. · RV: Not well visualized. Normal global systolic function. · MV: Mitral valve non-specific thickening. Mitral valve leaflet calcification without reduced excursion. · PA: Mild pulmonary hypertension. Pulmonary arterial systolic pressure is 33 mmHg. ASSESSMENT/IMPRESSION:   Toxic/metabolic encephalopathy and/or a stroke resulting in aphasia. Patient needs to undergo hemodialysis says his myoclonic jerking indicates severe metabolic encephalopathy.     RECOMMENDATIONS:  1. Continue Aspirin 81mg po daily  2. Telemetry monitoring  3. Neuro checks per stroke protocol  4. Permissive hypertension (do not treat if BP is not >200/110, prefer prn labetolol 5mg)  5. IV normal saline or oral liquids for hydration. 6. MRI without contrast  7. Lipid panel and hemoglobin A1c pending. 8. Treatment of underlying toxic/metabolic etiologies. I will follow the patient. Please do not hesitate to return with any questions.     Signed:  Tangela Llanos MD  8/30/2021  12:36 PM

## 2021-08-30 NOTE — ROUTINE PROCESS
Bedside and Verbal shift change report given to Jade Estevez RN  (oncoming nurse) by Espinoza Jackson RN  (offgoing nurse). Report given with SBAR, Kardex, Intake/Output and Recent Results.

## 2021-08-30 NOTE — PROGRESS NOTES
RENAL CONSULT FOLLOW UP NOTE   2021    Patient:  Andre Lockett :  1970  Gender:  male  MRN #:  [id number]    Reason for Consult: Management of Hyperkalemia and Severe metabolic acidosis in setting of ESRD requested by ED physician Dr Raul An     Assessment:  Andre Lockett is a 54y.o. year old male  With h/o ESRD on HD (MWF) , Uncontrolled HTN, DM, CHF with EF 40 % , right BKA status , chronic diarrhea and abdominal discomfort was brought by EMS due to unresponsiveness. He was found to have hypoglycemia requiring dextrose drip initially . He is non compliant with dialysis session and medications . Mostly does not take anti hypertensive meds and comes with uncontrolled HTN in dialysis unit . He had missed dialysis more than 1 week due to not feeling well and diarrhea . He was found to have Hypertensive urgency on admission with hyperkalemia, significant metabolic acidosis and pulmonary vascular congestion in CXR. Initially admitted  in ICU for further care  On insulin , dextrose drip for Hyperkalemia ant transferred in floor after dialysis. Overnight events:   - Reportedly he was confused overnight and become aphasic , there was suspicion stroke  He definitely seems confused, awake but does not follow command   BP is stable for last 24 hours except one episodes of low /77 mmHg yesterday     21- reportedly he remained confused/restless /agiated overnight . Unable to do MRI brain yesterday due to his restless ness . Off Antihypertensive meds , BP stable     Plan:      # ESRD- He is still confused and aphasic could be from stroke vs encephalopathy from toxin/metabolic infectious . Reportedly was not able to do MRI yesterday to r/u stroke  due to restless ness   - Appreciate neurology assistance , suspicion of stroke , recommended work up in progress   - He does not have hyperkalemia , acidosis and breathing is comfortable in room air.  Will plan to dialyze him today in his regular schedule which might be challenging due to extreme confusion as he is not following any safety instruction. Ativan has been ordered , will dialyze him as long as he remains stable and dose not pose threat to dislodge needle after sedation .   - he might need more sedation to complete dialysis if he remains restless which may not be safe in setting of suspicion of stoke ,could lead to respiratory depression   - will re-assess  dialysis need for tomorrow morning again     - Intake and output recording   - Dose all meds for ESRD    # Metabolic Acidosis -Improved       # Chronic Diarrhea :- Has h/o C.diff infection in the past .  He has genuine problems of chronic diarrhea for many months. Have seen him going to bathroom every 10 minutes in dialysis unit He mostly complaints of abdominal discomfort which is the reason why he said he miss dialysis  On 8/20 /21: stool showed- Indeterminate for Toxigenic C. Difficile  His symptoms could be due to chronic C.diff infection , may benefit from chronic suppressive therapy or any other intervention   - consider ID consult this admission for this   - Also he mostly complaints of abdominal discomfort and has significant anemia mostly . Not sure if he has chronic GI bleed from colitis   - consider CT abdomen and pelvis once he is more stable prior to discharge and GI consult if he needs colonoscopy due to clinical symptoms of colitis     # Hypertension: -  Stable at present .  As per neurology allow permissive hypertensive , not to treat unless BP is above > 220/110 mmHg   Iv Labetalol 10 mg q 6 hr prn if SBP > 220/110    Hold  coreg 25 mg BID , amlodipine 10 mg , Hydralazine 50 mg TID and Imdur 60 mg        # Anemia: Hemoglobin is below target range   Not iron deficient    EVIE during dialysis   Iron profile     # BMD- Ionized calcium is borderline low - Already received 1 gram calcium gluconate    Phos  is high- continue  sevelamer 1600 mg TID   Secondary Hyperparathyroidism, Elevated PTH- continue  Zemplar 4 mcg during HD. Run on 3 mmol/lt calcium bath       Rest of the medical management per primary team       Subjective:   - He is wake but confused ,aphasic , does not follow any commands   - BP stable  - Breathing is fine in room air.     -  Intake/Output Summary (Last 24 hours) at 8/30/2021 1602  Last data filed at 8/30/2021 1315  Gross per 24 hour   Intake 10 ml   Output 0 ml   Net 10 ml         Objective:    Visit Vitals  BP (!) 150/76   Pulse 87   Temp 97.6 °F (36.4 °C) (Axillary)   Resp 16   Ht 5' 7\" (1.702 m)   Wt 78.9 kg (174 lb)   SpO2 100%   BMI 27.25 kg/m²       Physical Exam:    Pt awake  Lung: clear to auscultation    Ext: RT- BKA status , left - no edema, heel ulcer/infection    Laboratory Data:    Lab Results   Component Value Date    BUN 73 (H) 08/30/2021    BUN 58 (H) 08/29/2021    BUN 95 (H) 08/28/2021     08/30/2021     08/29/2021     08/28/2021    CO2 22 08/30/2021    CO2 24 08/29/2021    CO2 17 (L) 08/28/2021     Lab Results   Component Value Date    WBC 5.2 08/30/2021    HGB 11.2 (L) 08/30/2021    HCT 32.3 (L) 08/30/2021     No components found for: CALCIUM, PHOSPHORUS, MAGNESIUM  Lab Results   Component Value Date    HDL 45 08/30/2021     No results found for: SPECIMENTYP, TURBIDITY, UGLU    Imaging Reveiwed:    CXR 8/27/21- 1. Adequate right subclavian central venous catheter placement with no  postprocedural complication. 2. Cardiomegaly with central vascular congestion and interstitial pulmonary  edema, unchanged. 8/29/21: CTA head; No definite large vessel occlusion.   No hemodynamically significant ICA stenosis, based on NASCET criteria. Moderate to severe stenosis involving right vertebral artery origin.   No high-grade intracranial stenosis.  Mild atherosclerotic narrowing of the  bilateral carotid siphons.       Lyn Veras MD   [facility].- Nephrology

## 2021-08-30 NOTE — PROGRESS NOTES
[phone] patient refused to have vitals taken, became agitated. Shift summary: patient slept occasionally. A sitter was at the bedside to prevent him from getting up in the bed on his knees. He allowed point of care tests and labs to be drawn from his central line. There were no new clinical concerns.

## 2021-08-30 NOTE — PROGRESS NOTES
Pharmacy Renal Dosing Services      Cefepime was automatically dose-adjusted per [facility] P&T Committee Protocol, with respect to renal function. Consult provided for this   54 y.o. , male , for the indication of Skin and Soft Tissue Infection. Dose adjusted to: Cefepime 1 gram IV q24h  (x 3 doses remaining)   DOT: 4 of 7     Pt Weight:   Wt Readings from Last 1 Encounters:   08/27/21 78.9 kg (174 lb)     Previous Regimen     Cefepime 1 gram IV q8h   Serum Creatinine Lab Results   Component Value Date/Time    Creatinine 14.00 (H) 08/30/2021 02:50 AM       Creatinine Clearance Estimated Creatinine Clearance: 6 mL/min (A) (based on SCr of 14 mg/dL (H)). BUN Lab Results   Component Value Date/Time    BUN 73 (H) 08/30/2021 02:50 AM         Pharmacy to continue to monitor patient daily. Will make dosage adjustments based upon changing renal function.   Signed Anna Ruiz information:  [phone]

## 2021-08-30 NOTE — PROGRESS NOTES
Care Management    Reason for Admission: Hyperkalemia     Chart reviewed. Per H&P: Anne Mishra is a 54 y.o.  male who has history of chronic kidney disease on dialysis Monday Wednesday Friday, right BKA, diabetes, congestive heart failure last EF 40%, hypertension, sickle cell trait presents to the emergency room via EMS was found unresponsive. Blood sugar at that time was forty-three IV access was the photo obtained oral tablets were given until he became responsive. Upon arrival arousable patient became combative and required Ativan and had difficult IV access. Central line was placed with difficulty due to combativeness post x-ray central line shows no pneumothorax and correct placement however there is substantial amount of bleeding at the site. Patient missed dialysis for about a week he states that he was feeling too weak to go to dialysis. He has had intermittent bouts of diarrhea there is a history of C. difficile being positive back in December 2020. Patient was in our emergency room a repeat C. difficile was done which is essentially negative but initial was intermediate. Patient states that he still has bouts of diarrhea. He is fully vaccinated denied any known Covid exposures. He has his chronic foot ulcer that he has been taking care of himself but has not gotten any worse according to him on his left foot. There is some tenderness when you touch in the emergency room patient was found to be severely hyperkalemic with nonspecific EKG changes but no peaked T's he was started on insulin dextrose/hyperkalemia protocol. \"               RUR Score: 28%       COVID Vaccine Status: One Khoi Solo    PCP: First and Last name: Mary Lowry MD   Name of Practice:   Are you a current patient: Yes/No:   Approximate date of last visit:    Can you do a virtual visit with your PCP:     Prior to admission patient was living:     Prior to admission patient was using the following DME: Resources/supports as identified by patient/family:                   Top Challenges facing patient (as identified by patient/family and CM): Finances/Medication cost?                    Transportation? Support system or lack thereof? Living arrangements? Self-care/ADLs/Cognition?            Current Advanced Directive/Advance Care Plan: Full Code no ACP docs on file    Healthcare Decision Maker:   Click here to complete [employer] including selection of the Healthcare Decision Maker Relationship (ie \"Primary\")                          Payor Source Payor: [place] / Plan: Carolyn Block / Product Type: Medicare /     Plan for utilizing home health: TBD                    Transition of Care Plan: In progress

## 2021-08-30 NOTE — PROGRESS NOTES
RENAL CONSULT FOLLOW UP NOTE   2021    Patient:  Nito Reis. :  1970  Gender:  male  MRN #:  [id number]    Reason for Consult: Management of Hyperkalemia and Severe metabolic acidosis in setting of ESRD requested by ED physician Dr Deepali Cano     Assessment:  Nito Reis. is a 54y.o. year old male  With h/o ESRD on HD (MWF) , Uncontrolled HTN, DM, CHF with EF 40 % , right BKA status , chronic diarrhea and abdominal discomfort was brought by EMS due to unresponsiveness. He was found to have hypoglycemia requiring dextrose drip initially . He is non compliant with dialysis session and medications . Mostly does not take anti hypertensive meds and comes with uncontrolled HTN in dialysis unit . He had missed dialysis more than 1 week due to not feeling well and diarrhea . He was found to have Hypertensive urgency on admission with hyperkalemia, significant metabolic acidosis and pulmonary vascular congestion in CXR. Initially admitted  in ICU for further care  On insulin , dextrose drip for Hyperkalemia ant transferred in floor after dialysis. Overnight events:   - Reportedly he was confused overnight and become aphasic , there was suspicion stroke  He definitely seems confused, awake but does not follow command   BP is stable for last 24 hours except one episodes of low /77 mmHg yesterday     21- reportedly he remained confused/restless /agiated overnight . Unable to do MRI brain yesterday due to his restless ness . Off Antihypertensive meds , BP stable     Plan:      # ESRD- He is still confused and aphasic could be from stroke vs encephalopathy from toxin/metabolic infectious . Reportedly was not able to do MRI yesterday to r/u stroke  due to restless ness   - Appreciate neurology assistance , suspicion of stroke , recommended work up in progress   - He does not have hyperkalemia , acidosis and breathing is comfortable in room air.  Will plan to dialyze him today in his regular schedule which might be challenging due to extreme confusion as he is not following any safety instruction. Ativan has been ordered , will dialyze him as long as he remains stable and dose not pose threat to dislodge needle after sedation .   - he might need more sedation to complete dialysis if he remains restless which may not be safe in setting of suspicion of stoke ,could lead to respiratory depression   - will re-assess  dialysis need for tomorrow morning again  -If he is not eating and drinking at all- consider D5W 30-50 cc/hr as he had low serum glucose in the morning .      - Intake and output recording   - Dose all meds for ESRD    # Metabolic Acidosis -Improved       # Chronic Diarrhea :- Has h/o C.diff infection in the past .  He has genuine problems of chronic diarrhea for many months. Have seen him going to bathroom every 10 minutes in dialysis unit He mostly complaints of abdominal discomfort which is the reason why he said he miss dialysis  On 8/20 /21: stool showed- Indeterminate for Toxigenic C. Difficile  His symptoms could be due to chronic C.diff infection , may benefit from chronic suppressive therapy or any other intervention   - consider ID consult this admission for this   - Also he mostly complaints of abdominal discomfort and has significant anemia mostly . Not sure if he has chronic GI bleed from colitis   - consider CT abdomen and pelvis once he is more stable prior to discharge and GI consult if he needs colonoscopy due to clinical symptoms of colitis     # Hypertension: -  Stable at present .  As per neurology allow permissive hypertensive , not to treat unless BP is above > 220/110 mmHg   Iv Labetalol 10 mg q 6 hr prn if SBP > 220/110    Hold  coreg 25 mg BID , amlodipine 10 mg , Hydralazine 50 mg TID and Imdur 60 mg        # Anemia: Hemoglobin is below target range   Not iron deficient    EVIE during dialysis   Iron profile     # BMD- Ionized calcium is borderline low - Already received 1 gram calcium gluconate    Phos  is high- continue  sevelamer 1600 mg TID   Secondary Hyperparathyroidism, Elevated PTH- continue  Zemplar 4 mcg during HD. Run on 3 mmol/lt calcium bath       Rest of the medical management per primary team       Subjective:   - He is wake but confused ,aphasic , does not follow any commands   - BP stable  - Breathing is fine in room air.     -    Intake/Output Summary (Last 24 hours) at 8/30/2021 1629  Last data filed at 8/30/2021 1315  Gross per 24 hour   Intake 10 ml   Output 0 ml   Net 10 ml         Objective:    Visit Vitals  BP (!) 150/76   Pulse 87   Temp 97.6 °F (36.4 °C) (Axillary)   Resp 16   Ht 5' 7\" (1.702 m)   Wt 78.9 kg (174 lb)   SpO2 100%   BMI 27.25 kg/m²       Physical Exam:    Pt awake  Lung: clear to auscultation    Ext: RT- BKA status , left - no edema, heel ulcer/infection    Laboratory Data:    Lab Results   Component Value Date    BUN 73 (H) 08/30/2021    BUN 58 (H) 08/29/2021    BUN 95 (H) 08/28/2021     08/30/2021     08/29/2021     08/28/2021    CO2 22 08/30/2021    CO2 24 08/29/2021    CO2 17 (L) 08/28/2021     Lab Results   Component Value Date    WBC 5.2 08/30/2021    HGB 11.2 (L) 08/30/2021    HCT 32.3 (L) 08/30/2021     No components found for: CALCIUM, PHOSPHORUS, MAGNESIUM  Lab Results   Component Value Date    HDL 45 08/30/2021     No results found for: SPECIMENTYP, TURBIDITY, UGLU    Imaging Reveiwed:    CXR 8/27/21- 1. Adequate right subclavian central venous catheter placement with no  postprocedural complication. 2. Cardiomegaly with central vascular congestion and interstitial pulmonary  edema, unchanged. 8/29/21: CTA head; No definite large vessel occlusion.   No hemodynamically significant ICA stenosis, based on NASCET criteria. Moderate to severe stenosis involving right vertebral artery origin.   No high-grade intracranial stenosis.  Mild atherosclerotic narrowing of the  bilateral carotid cheryl.       MD Martí[facility]- Nephrology

## 2021-08-30 NOTE — PROGRESS NOTES
TREATMENT SUMMARY   Received report from [address] [place] [address] [facility]. Received patinet in room laying on left side in fetal position, non verbal. Difficult cannulation due to having to redirect patient. Patient is not following commands. Tx initiated at 1030, could not continue. Patient is restless. Trying to sit up, pulling on lines and consistantly leaning on access arm. This is making the machine alarm and unable to operate. As per Dr. Bhavesh Fernández spoke with Primary nurse to have hospitalist prescribe something to calm patient down. 1136 Ativan 0.5 mg IVP administered by Primary Nurse. HD treatment restarted 1146. During treatment patient is still restless and leaning on access. Patient was repositioned several times prior. Dr Bhavesh Fernández made aware and HD treatment completed. Patient in room 343 dialyzed for 1.20 hours. Tolerated tx fair. LUE AVG functioning without complication accessing or BFR   mL/min  DFR 600mL/min  500 ml UF removed with a net UF removal of 0 ml. Report given to Ilsa Tian RN with all questions answered. TREATMENT NOTES                                                                                                ACUTE HEMODIALYSIS FLOW SHEET       PATIENT INFORMATION   [facility]       DR. 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     ____________ Code Status [x]Full Code [] DNR  [] Other_____   Diet: [] Renal [] NPO [x] Diabetic   [] Enteral Feeding [] Cardiac Diabetic: [x]Yes []No     [x]Signed Treatment Consent Verified   [x] Time Out/ Safety Check   PRIMARY NURSE REPORT: FIRST INITIAL/ LAST NAME/TITLE  PRE DIALYSIS:      David Cortes RN                                     TIME: 3330   ACCESS   CATHETER ACCESS: [x] N/A  [] RIGHT  [] LEFT  [] IJ  [] SUBCL [] FEM                    [] First use X-ray  [] Tunnel     [] Non-Tunneled      [] No S/S infection  [] Redness [] Drainage  [] Cultured [] Swelling [] Pain                    [] Medical Aseptic [] Prep Dressing Changed                  [] Clotted [] Patent []      Flows: [] Good [] Poor [] Reversed                 If Access Problem Dr. Whit Bagley: [] Yes [] No    Date:_____  [] N/A[]   GRAFT/FISTULA ACCESS:  [] N/A  [] RIGHT  [x] LEFT  [x] UE   [] LE       [x] AVG  [] AVF [] BUTTONHOLE    [x] +BRUIT/THRILL [x] MEDICAL ASEPTIC PREP     [x] No S/S infection  [] Redness [] Drainage  [] Cultured [] Swelling [] Pain              If Access Problem Dr. Whit Bagley: [] Yes [] No    Date:______ [x] N/A   GENERAL ASSESSMENT   LUNGS:  SaO2% _100___ [x] Clear [] Coarse [] Crackles [] Wheezing               [] Diminished Location: [] RLL [] LLL [] RUL [] KYLAH    COUGH:  [] Productive  [] Loose[] Dry [x] N/A  RESPIRATIONS: [] Easy [] Labored    THERAPY: [x] RA   [] NC _____.  L/min    Mask: [] NRB [] Venti  _____O2%                  [] Ventilator [] Intubated [] Trach [] BiPap [] CPap [] HI Flow   CARDIAC: [x] Regular [] Irregular [] Pericardial Rub [] JVD               Monitored Rhythm:______ [] N/A   EDEMA: [x] None [] Generalized [] Facial [] Pedal [] UE [] LE             [] Pitting [] 1 [] 2 [] 3 [] 4    [] Right [] Left [] Bilateral   SKIN:    [x] Warm [] Hot [] Cold  [x] Dry [] Pale [] Diaphoretic              [] Flushed [] Jaundiced [] Cyanotic [] Rash [] Weeping     LOC:    [] Alert  Oriented to: [] Person [] Place [] Time             [x] Confused [] Lethargic [] Medicated [] Non-responsive    GI/ABDOMEN: [] Flat [] Distended [x] Soft [] Firm [] Diarrhea [x] Bowel Sounds Present [] Nausea [] Vomiting    PAIN: [] 0 [] 1 [] 2 [] 3 [] 4 [] 5 [] 6 [] 7 [] 8 [] 9 [] 10          Scale 1-10 Action/Follow Up_____   MOBILITY: [] Amb [] Amb/Assist [x] Bed  [] Wheelchair    CURRENT LABS   HBsAg ONLY: Date Drawn:  08/27/2021           [x] Negative [] Positive [] Unknown. HBsAb: Date Drawn:    08/27/2021          [x] Susceptible <10 [] Immune ?10 [] Unknown   Date of Current Labs:  ATTACHED     EDUCATION   Person Educated: [x] Patient [] Other_________   Knowledge base: [x] None [] Minimal [] Substantial    Barriers to learning  [] None __Patient is confused, non verbal_____________   Preferred method of learning: [] Written [x] Oral [x] Visual [] Hands on    Topic: [x] Access Care [x] S&S of infection [] Fluid Management   [x] K+  [x] Procedural  [] Albumin [] Medications [] Tx Options   [] Transplant [] Diet [] Other    Teaching Tools: [x] Explain [] Demonstration [] Handout_____ [] Video______  CARE PLAN    [x] Renal Failure (Adult)  Interdisciplinary  · Fluid and electrolytes stabilized  ? Interventions  · Dehydration signs and symptoms (eg: Weight/lab monitoring; vomiting/diarrhea/urine; tenting; mucous membranes; dizziness/lethargy/irritability/confusion; weak pulse; tachycardia; blood pressure; I&O)  · Fluid overload signs and symptoms assessment (eg: Body weight increased; dyspnea; edema; hypertension; respiratory crackles/wheezing; JVD; lab monitoring; mental status changes; I&O)  · Monitor appropriate lab values  · COMPLIANCE WITH PRESCRIBED THERAPY  · ARTERIAL ACCESS SITE ASSESSMENT  · NUTRITION SCREENING  · Vital signs monitoring per assessed patient condition or unit standard  · Cardiac monitoring  · Hydration management  · Intake and output measurement  · Body weight monitoring  · Skin care  · DIALYSIS  · Nutrition Care Process per nutrition screen  · Oral hygiene care every 2 hours  · Pain management     · Outcome   ? [x] Progressing Towards Goal  ? [] Not Progressing Towards Goals  ? [] Goals Met/Resolved  ? [] Goals Not Met/ Resolved        · Patient/ Family Education  ?  Progressing Towards Goals          RO/HEMODIAYLSIS MACHINE SAFETY CHECKS- BEFORE EACH TREATMENT          [] [facility]: Machine Serial #1:  [id number]   RO Serial #1: [id number]                       [] [facility]: Machine Serial #2:  [id number]   RO Serial #2: [id number]        [x] [facility]: Machine Serial #3:  [id number]   RO Serial #4:[id number]    Alarm Test: [x] Pass  Time 1025________  [] RO/Machine Log Complete    [] Extracorporeal circuit Tested for integrity           Dialyzer_[id number]_________   Tubing___20K11-11_________    Dialysate: pH__7.4_  Temp. _36___Conductivity: Meter 14____ HD Machine_14__   CHLORINE TESTING- BEFORE EACH TREATMENT AND EVERY 4 HOURS   Total Chlorine: [x] Less than 0.1 ppm Time: 1025_____2nd Check Time:__N/A____  (If greater than 0.1 ppm from Primary then every 30 minutes from Secondary)   TREATMENT INIATION-WITH DIALYSIS PRECAUTIONS   [x] All Connections Secured   [x] Saline Line Double Clamped    [x] Venous Parameters Set [x] Arterial Parameters Set    [x] Prime Given 250 ml     [x] Air Foam Detector Engaged   PRE-TREATMENT   UF Calculations: Wt to lose:__0__ml(+) Oral:__ml(+)IV Meds/Fluids/Blood prods___ml(+) Prime/Rinse__500_ml(=)Total UF Goal_500___mL   Scale Type:[x] Bed scale [] Sling Scale [] Wheel Chair Scale [x]  Not Ordered [] [] Unable to obtain pt on stretcher/ bed scale malfunctioning   Tx Initiation Note:  Received patient in bed semi fowlers position. Left upper arm AVG positive bruit and thrill present. No s/s of infection noted. Same prepped aseptically and cannulated x 2 #15 gauge needles. Treatment initiated as per MD order. Plan to remove 0 liters for 3 hours while monitoring patient's well-being and vital signs.    [x] Time Out/Safety Check  Time:__1030___   INTRADIALYTIC MONITORING  (SEE ATTACHED FLOWSHEET)     POST TREATMENT    Time Medication Dose Volume Route    Initials                                           [facility] Signatures Title Initials Time                     Dialyzer cleared: [] Good [x] Fair [] Poor     Blood Processed _15__Liters    Net UF Removed _0___mL  Post Tx Access:                  AVF/AVG: Bleeding Stop       Art._8__min Chaim._8___min [x]+bruit/thrill                Catheter: Locking Solution [] Heparin 1 ml/1000 units  [] Normal Saline                                                                               Art._____ ml Chaim._____ml  Post Assessment:              Skin: [x]Warm [x]Dry []Diaphoretic []Flushed [] Pale []Cyanotic            Lungs: [x]Clear []Coarse []Crackles []Wheezing             Cardiac: [x]Regular []Irregular  []Monitored rhythm____ []N/A            Edema: [x]None []General []Facial []Pedal  []UE []LE []RIGHT []LEFT            Pain: [x]0 []1 []2 []3 []4 []5 []6 []7 []8 []9 []10   POST Tx Note:   HD treatment completedafter 1.20 hrs. Patient restless throughout treatment. Dr. Zaina Baig made aware. Patient rinsed with 250 mL 0.9% N/S. Fistula needles removed, post bleeding WNL. Gauze/Tape dressing applied to puncture site. No active bleeding noted, positive bruit and thrill present. Pt. remains in bed 343 in stable condition freely breathing room air. No acute distress noted.              Primary Nurse Report: First initial/Last name/Title    Post Dialysis:_____Sushma Silvestre RN_____________________         Time:________________    Abbreviations: AVG-arterial venous graft, AVF-arterial venous fistula, IJ-Internal Jugular,  Subcl-Subclavian, Fem-Femoral, Tx-treatment, AP/HR-apical heart rate, DFR-dialysate flow rate, BFR-blood flow rate, AP-arterial pressure, -venous pressure, UF-ultrafiltrate, TMP-transmembrane pressure, Chaim-Venous, Art-Arterial, RO-Reverse Osmosis

## 2021-08-30 NOTE — PROGRESS NOTES
Intervention: Monitor/Manage Fluid Electrolyte/Acid Base Balance    PROBLEM: HEMODIALYSIS ADULT    INTERVENTION: Hemodynamic stabilization  Maintain BP WNL for this patient while on hemodialysis    INTERVENTION: Fluid Management  Will attempt 0 ml total fluid removal as tolerated    INTERVENTION: Metabolic / Electrolyte Imbalance Management  K+ 3 potassium bath today with dialysis    GOAL: Signs and symptoms of listed potential problems will be absent or manageable  (reference Hemodialysis ADULT [facility])    OUTCOME: Progressing  HD planned for 3 hours today       Problem: Chronic Renal Failure  Goal: *Fluid and electrolytes stabilized  8/30/2021 1352 by Kiera Truong RN  Outcome: Progressing Towards Goal  8/30/2021 1302 by Kiera Truong RN  Outcome: Progressing Towards Goal     Problem: Patient Education: Go to Patient Education Activity  Goal: Patient/Family Education  8/30/2021 1352 by Kiera Truong RN  Outcome: Progressing Towards Goal  8/30/2021 1302 by Kiera Truong RN  Outcome: Progressing Towards Goal

## 2021-08-31 ENCOUNTER — APPOINTMENT (OUTPATIENT)
Dept: MRI IMAGING | Age: 51
DRG: 640 | End: 2021-08-31
Attending: PSYCHIATRY & NEUROLOGY
Payer: MEDICARE

## 2021-08-31 LAB
ANION GAP SERPL CALC-SCNC: 12 MMOL/L (ref 3–18)
BACTERIA SPEC CULT: ABNORMAL
BUN SERPL-MCNC: 55 MG/DL (ref 7–18)
BUN/CREAT SERPL: 5 (ref 12–20)
CALCIUM SERPL-MCNC: 8.5 MG/DL (ref 8.5–10.1)
CHLORIDE SERPL-SCNC: 102 MMOL/L (ref 100–111)
CO2 SERPL-SCNC: 24 MMOL/L (ref 21–32)
CREAT SERPL-MCNC: 12.2 MG/DL (ref 0.6–1.3)
GLUCOSE BLD STRIP.AUTO-MCNC: 118 MG/DL (ref 70–110)
GLUCOSE BLD STRIP.AUTO-MCNC: 129 MG/DL (ref 70–110)
GLUCOSE BLD STRIP.AUTO-MCNC: 67 MG/DL (ref 70–110)
GLUCOSE BLD STRIP.AUTO-MCNC: 69 MG/DL (ref 70–110)
GLUCOSE BLD STRIP.AUTO-MCNC: 71 MG/DL (ref 70–110)
GLUCOSE BLD STRIP.AUTO-MCNC: 77 MG/DL (ref 70–110)
GLUCOSE BLD STRIP.AUTO-MCNC: 78 MG/DL (ref 70–110)
GLUCOSE BLD STRIP.AUTO-MCNC: 78 MG/DL (ref 70–110)
GLUCOSE BLD STRIP.AUTO-MCNC: 93 MG/DL (ref 70–110)
GLUCOSE SERPL-MCNC: 68 MG/DL (ref 74–99)
GRAM STN SPEC: ABNORMAL
GRAM STN SPEC: ABNORMAL
MAGNESIUM SERPL-MCNC: 2.4 MG/DL (ref 1.6–2.6)
PHOSPHATE SERPL-MCNC: 7.5 MG/DL (ref 2.5–4.9)
POTASSIUM SERPL-SCNC: 4.4 MMOL/L (ref 3.5–5.5)
SERVICE CMNT-IMP: ABNORMAL
SODIUM SERPL-SCNC: 138 MMOL/L (ref 136–145)

## 2021-08-31 PROCEDURE — 74011250636 HC RX REV CODE- 250/636: Performed by: FAMILY MEDICINE

## 2021-08-31 PROCEDURE — 84100 ASSAY OF PHOSPHORUS: CPT

## 2021-08-31 PROCEDURE — 74011250636 HC RX REV CODE- 250/636: Performed by: INTERNAL MEDICINE

## 2021-08-31 PROCEDURE — 74011250637 HC RX REV CODE- 250/637: Performed by: STUDENT IN AN ORGANIZED HEALTH CARE EDUCATION/TRAINING PROGRAM

## 2021-08-31 PROCEDURE — 74011250636 HC RX REV CODE- 250/636: Performed by: STUDENT IN AN ORGANIZED HEALTH CARE EDUCATION/TRAINING PROGRAM

## 2021-08-31 PROCEDURE — C9113 INJ PANTOPRAZOLE SODIUM, VIA: HCPCS | Performed by: INTERNAL MEDICINE

## 2021-08-31 PROCEDURE — 82962 GLUCOSE BLOOD TEST: CPT

## 2021-08-31 PROCEDURE — 65660000000 HC RM CCU STEPDOWN

## 2021-08-31 PROCEDURE — 74011000250 HC RX REV CODE- 250: Performed by: FAMILY MEDICINE

## 2021-08-31 PROCEDURE — 90935 HEMODIALYSIS ONE EVALUATION: CPT

## 2021-08-31 PROCEDURE — 74011250637 HC RX REV CODE- 250/637: Performed by: INTERNAL MEDICINE

## 2021-08-31 PROCEDURE — 74011000250 HC RX REV CODE- 250: Performed by: INTERNAL MEDICINE

## 2021-08-31 PROCEDURE — 80048 BASIC METABOLIC PNL TOTAL CA: CPT

## 2021-08-31 PROCEDURE — 83735 ASSAY OF MAGNESIUM: CPT

## 2021-08-31 PROCEDURE — 70551 MRI BRAIN STEM W/O DYE: CPT

## 2021-08-31 RX ORDER — THIAMINE HYDROCHLORIDE 100 MG/ML
100 INJECTION, SOLUTION INTRAMUSCULAR; INTRAVENOUS DAILY
Status: DISCONTINUED | OUTPATIENT
Start: 2021-09-01 | End: 2021-09-06 | Stop reason: HOSPADM

## 2021-08-31 RX ORDER — LABETALOL HCL 20 MG/4 ML
10 SYRINGE (ML) INTRAVENOUS
Status: DISCONTINUED | OUTPATIENT
Start: 2021-08-31 | End: 2021-09-06 | Stop reason: HOSPADM

## 2021-08-31 RX ORDER — HYDRALAZINE HYDROCHLORIDE 20 MG/ML
20 INJECTION INTRAMUSCULAR; INTRAVENOUS
Status: DISCONTINUED | OUTPATIENT
Start: 2021-08-31 | End: 2021-09-06 | Stop reason: HOSPADM

## 2021-08-31 RX ORDER — LORAZEPAM 2 MG/ML
0.5 INJECTION INTRAMUSCULAR
Status: DISCONTINUED | OUTPATIENT
Start: 2021-08-31 | End: 2021-09-02

## 2021-08-31 RX ORDER — THIAMINE HYDROCHLORIDE 100 MG/ML
100 INJECTION, SOLUTION INTRAMUSCULAR; INTRAVENOUS
Status: COMPLETED | OUTPATIENT
Start: 2021-08-31 | End: 2021-08-31

## 2021-08-31 RX ORDER — DIPHENHYDRAMINE HCL 25 MG
25 CAPSULE ORAL
Status: DISCONTINUED | OUTPATIENT
Start: 2021-08-31 | End: 2021-08-31

## 2021-08-31 RX ORDER — DIPHENHYDRAMINE HYDROCHLORIDE 50 MG/ML
12.5 INJECTION, SOLUTION INTRAMUSCULAR; INTRAVENOUS
Status: DISCONTINUED | OUTPATIENT
Start: 2021-08-31 | End: 2021-09-06 | Stop reason: HOSPADM

## 2021-08-31 RX ORDER — DEXTROSE MONOHYDRATE 50 MG/ML
30 INJECTION, SOLUTION INTRAVENOUS CONTINUOUS
Status: DISCONTINUED | OUTPATIENT
Start: 2021-08-31 | End: 2021-09-01

## 2021-08-31 RX ADMIN — LORAZEPAM 0.5 MG: 2 INJECTION INTRAMUSCULAR; INTRAVENOUS at 10:50

## 2021-08-31 RX ADMIN — CARVEDILOL 25 MG: 12.5 TABLET, FILM COATED ORAL at 17:32

## 2021-08-31 RX ADMIN — DEXTROSE MONOHYDRATE 12.5 G: 25 INJECTION, SOLUTION INTRAVENOUS at 05:00

## 2021-08-31 RX ADMIN — WATER 1 G: 1 INJECTION INTRAMUSCULAR; INTRAVENOUS; SUBCUTANEOUS at 17:32

## 2021-08-31 RX ADMIN — LEVOFLOXACIN 500 MG: 500 INJECTION, SOLUTION INTRAVENOUS at 02:42

## 2021-08-31 RX ADMIN — THIAMINE HYDROCHLORIDE 100 MG: 100 INJECTION, SOLUTION INTRAMUSCULAR; INTRAVENOUS at 11:52

## 2021-08-31 RX ADMIN — DEXTROSE MONOHYDRATE 30 ML/HR: 5 INJECTION, SOLUTION INTRAVENOUS at 11:52

## 2021-08-31 RX ADMIN — DEXTROSE MONOHYDRATE 12.5 G: 25 INJECTION, SOLUTION INTRAVENOUS at 09:14

## 2021-08-31 RX ADMIN — SEVELAMER CARBONATE 1600 MG: 800 TABLET, FILM COATED ORAL at 17:37

## 2021-08-31 RX ADMIN — HYDRALAZINE HYDROCHLORIDE 50 MG: 50 TABLET, FILM COATED ORAL at 17:32

## 2021-08-31 RX ADMIN — HYDRALAZINE HYDROCHLORIDE 50 MG: 50 TABLET, FILM COATED ORAL at 21:01

## 2021-08-31 RX ADMIN — PANTOPRAZOLE SODIUM 40 MG: 40 INJECTION, POWDER, FOR SOLUTION INTRAVENOUS at 02:41

## 2021-08-31 RX ADMIN — DIPHENHYDRAMINE HYDROCHLORIDE 12.5 MG: 50 INJECTION, SOLUTION INTRAMUSCULAR; INTRAVENOUS at 04:34

## 2021-08-31 NOTE — PROGRESS NOTES
NEUROLOGY PROGRESS NOTE        Patient: Halie Monroe. Sex: male          DOA: 8/26/2021  YOB: 1970      Age:  54 y.o.         LOS: 4 days     Identification:  54 y.o. male with history of DM, CHF, HTN, Sickle cell trait, CKD on HD, who presented with remarkable confusion in the setting of missing dialysis, hypoglycemia and intermittent diarrhea. SUBJECTIVE:   The patient received hemodialysis for 1.5 hours yesterday. He is more alert today. Brain MRI doesn't show any acute infarctions. Stroke Work-up:  Brain MRI: 1. No acute hemorrhage or acute infarction. 2. Bilateral cerebellar hemisphere small areas of chronic infarction. 3. Very mild atrophy as described with white matter abnormality that is nonspecific but likely ischemic. CT Head 8/29: No acute intracranial abnormalities. Findings were discussed with patient's care provider shortly after study performed at approximately 12:13 a.m. August 29, 2021. CTA of head and neck: 1. No definite large vessel occlusion. 2.  No hemodynamically significant ICA stenosis, based on NASCET criteria. 3. Moderate to severe stenosis involving right vertebral artery origin. 4. No high-grade intracranial stenosis. Mild atherosclerotic narrowing of the bilateral carotid siphons. Echocardiogram:  Echo study was limited due to patient's tolerance. · LV: Estimated LVEF is 50 - 55%. Normal cavity size. Moderate concentric hypertrophy. Low normal systolic function. Unable to assess diastolic function due to elevated heart rate. · LA: Mildly dilated left atrium. · RV: Not well visualized. Normal global systolic function. · MV: Mitral valve non-specific thickening. Mitral valve leaflet calcification without reduced excursion. · PA: Mild pulmonary hypertension. Pulmonary arterial systolic pressure is 33 mmHg.     Lipid panel:   Lab Results   Component Value Date/Time    Cholesterol, total 146 08/30/2021 02:50 AM    HDL Cholesterol 45 08/30/2021 02:50 AM    LDL, calculated 81 08/30/2021 02:50 AM    VLDL, calculated 20 08/30/2021 02:50 AM    Triglyceride 100 08/30/2021 02:50 AM    CHOL/HDL Ratio 3.2 08/30/2021 02:50 AM     HbA1c:   Lab Results   Component Value Date/Time    Hemoglobin A1c 4.8 08/29/2021 01:02 AM       REVIEW OF SYSTEMS: Unable to review. Confused. OBJECTIVE:      Visit Vitals  BP (!) 182/77 (BP 1 Location: Left upper arm)   Pulse 86   Temp 98.1 °F (36.7 °C)   Resp 16   Ht 5' 7\" (1.702 m)   Wt 76.2 kg (168 lb)   SpO2 100%   BMI 26.31 kg/m²     Physical Exam:  GEN: Alert, confused. EYES: conjunctiva normal, lids with out lesions  EXTREMITIES: No edema cyanosis. Right BKA. SKIN: no rashes or skin breakdown, no nodules  NEURO: Alert. Able to state his name. Echolalia. He repeats what I ask him (brief words only). The patient is mildly confused. No asterixis today. Able to move all the extremities. Able to give me hand  bilaterally.     Current Facility-Administered Medications   Medication Dose Route Frequency    LORazepam (ATIVAN) injection 0.5 mg  0.5 mg IntraVENous Q4H PRN    diphenhydrAMINE (BENADRYL) injection 12.5 mg  12.5 mg IntraVENous Q6H PRN    dextrose 5% infusion  30 mL/hr IntraVENous CONTINUOUS    [START ON 9/1/2021] thiamine (B-1) injection 100 mg  100 mg IntraMUSCular DAILY    cefepime (MAXIPIME) 1 g in sterile water (preservative free) 10 mL IV syringe  1 g IntraVENous Q24H    glucose chewable tablet 16 g  4 Tablet Oral PRN    glucagon (GLUCAGEN) injection 1 mg  1 mg IntraMUSCular PRN    dextrose (D50W) injection syrg 12.5-25 g  25-50 mL IntraVENous PRN    aspirin delayed-release tablet 81 mg  81 mg Oral DAILY    labetaloL (NORMODYNE;TRANDATE) 20 mg/4 mL (5 mg/mL) injection 10 mg  10 mg IntraVENous Q6H PRN    paricalcitoL (ZEMPLAR) injection 4 mcg  4 mcg IntraVENous DIALYSIS MON, WED & FRI    acetaminophen (TYLENOL) tablet 650 mg  650 mg Oral Q6H PRN    Or    acetaminophen (TYLENOL) suppository 650 mg  650 mg Rectal Q6H PRN    polyethylene glycol (MIRALAX) packet 17 g  17 g Oral DAILY PRN    [Held by provider] heparin (porcine) injection 5,000 Units  5,000 Units SubCUTAneous Q8H    promethazine (PHENERGAN) tablet 12.5 mg  12.5 mg Oral Q6H PRN    Or    ondansetron (ZOFRAN) injection 4 mg  4 mg IntraVENous Q6H PRN    pantoprazole (PROTONIX) injection 40 mg  40 mg IntraVENous Q24H    levoFLOXacin (LEVAQUIN) 500 mg in D5W IVPB  500 mg IntraVENous Q48H    insulin lispro (HUMALOG) injection   SubCUTAneous Q4H    [Held by provider] amLODIPine (NORVASC) tablet 10 mg  10 mg Oral DAILY    [Held by provider] carvediloL (COREG) tablet 25 mg  25 mg Oral BID WITH MEALS    [Held by provider] isosorbide mononitrate ER (IMDUR) tablet 60 mg  60 mg Oral DAILY    [Held by provider] hydrALAZINE (APRESOLINE) tablet 50 mg  50 mg Oral TID    sevelamer carbonate (RENVELA) tab 1,600 mg  1,600 mg Oral TID WITH MEALS    heparin (porcine) 1,000 unit/mL injection 2,000 Units  2,000 Units Hemodialysis DIALYSIS PRN       Laboratory  Recent Results (from the past 24 hour(s))   GLUCOSE, POC    Collection Time: 08/30/21  5:30 PM   Result Value Ref Range    Glucose (POC) 83 70 - 110 mg/dL   GLUCOSE, POC    Collection Time: 08/30/21  8:42 PM   Result Value Ref Range    Glucose (POC) 69 (L) 70 - 110 mg/dL   GLUCOSE, POC    Collection Time: 08/30/21  9:43 PM   Result Value Ref Range    Glucose (POC) 111 (H) 70 - 110 mg/dL   GLUCOSE, POC    Collection Time: 08/31/21 12:24 AM   Result Value Ref Range    Glucose (POC) 78 70 - 110 mg/dL   MAGNESIUM    Collection Time: 08/31/21  3:15 AM   Result Value Ref Range    Magnesium 2.4 1.6 - 2.6 mg/dL   PHOSPHORUS    Collection Time: 08/31/21  3:15 AM   Result Value Ref Range    Phosphorus 7.5 (H) 2.5 - 4.9 MG/DL   METABOLIC PANEL, BASIC    Collection Time: 08/31/21  3:15 AM   Result Value Ref Range    Sodium 138 136 - 145 mmol/L    Potassium 4.4 3.5 - 5.5 mmol/L    Chloride 102 100 - 111 mmol/L    CO2 24 21 - 32 mmol/L    Anion gap 12 3.0 - 18 mmol/L    Glucose 68 (L) 74 - 99 mg/dL    BUN 55 (H) 7.0 - 18 MG/DL    Creatinine 12.20 (H) 0.6 - 1.3 MG/DL    BUN/Creatinine ratio 5 (L) 12 - 20      GFR est AA 5 (L) >60 ml/min/1.73m2    GFR est non-AA 4 (L) >60 ml/min/1.73m2    Calcium 8.5 8.5 - 10.1 MG/DL   GLUCOSE, POC    Collection Time: 08/31/21  4:55 AM   Result Value Ref Range    Glucose (POC) 67 (L) 70 - 110 mg/dL   GLUCOSE, POC    Collection Time: 08/31/21  5:18 AM   Result Value Ref Range    Glucose (POC) 129 (H) 70 - 110 mg/dL   GLUCOSE, POC    Collection Time: 08/31/21  9:05 AM   Result Value Ref Range    Glucose (POC) 69 (L) 70 - 110 mg/dL   GLUCOSE, POC    Collection Time: 08/31/21  9:32 AM   Result Value Ref Range    Glucose (POC) 118 (H) 70 - 110 mg/dL   GLUCOSE, POC    Collection Time: 08/31/21 11:53 AM   Result Value Ref Range    Glucose (POC) 77 70 - 110 mg/dL       Radiology:  XR FOOT LT MIN 3 V    Result Date: 8/27/2021  EXAM: XR FOOT LT MIN 3 V CLINICAL INDICATION/HISTORY: Left foot pain and erythema, evaluate for foreign body, osteomyelitis  COMPARISON: None. TECHNIQUE: AP, lateral, and oblique views of the left foot were obtained. _______________ FINDINGS: Soft tissue irregularity throughout mid and posterior aspects of the plantar left foot with diffuse soft tissue thickening. Prominent atherosclerotic vascular calcification. Diffuse, heterogeneous sclerosis throughout the calcaneus. Currently there is no associated cortical defect or periosteal reaction to indicate an acute component of osteomyelitis. No radiodense foreign body. Well-corticated osseous fragment involving distal Achilles tendon insertion, are listed from the posterior calcaneus. Pes planus morphology. Surgical changes suggestive of previous mid diaphyseal second metatarsal amputation with adjacent curvilinear heterotopic ossification.  _______________     Cellulitis and ulceration involving left hindfoot with findings suggestive of chronic osteomyelitis involving the calcaneus. No radiographic evidence of active osteomyelitis within the left foot. XR ABD (KUB)    Result Date: 8/20/2021  EXAM: SUPINE ABDOMINAL RADIOGRAPH CLINICAL INDICATION/HISTORY: diarrhea -Additional: None COMPARISON: 12/22/2020 TECHNIQUE: Single supine view of the abdomen. _______________ FINDINGS: BOWEL GAS PATTERN: No evidence of obstruction. No visible free air, given limitations of supine view. BONES: Unremarkable. OTHER: None. _______________     No significant abnormality. MRI BRAIN WO CONT    Result Date: 8/31/2021  Brain MR without contrast: Indication: Sudden onset decreased level of consciousness. Unresponsive. TIA versus infarction. Procedure: Sagittal spin echo T1, axial FSE FLAIR and T2 and axial diffusion weighted scanning was performed. An axial T2* scan optimized to detect hemosiderin and calcium was performed. Coronal spin echo T1and FSE T2 scans were also performed. No contrast was administered. Comparison exam: Head CT 8/29/2021. Findings: Diffusion: There are no areas of restricted diffusion, no acute infarction. The T2* scan shows no acute or chronic hemorrhage or abnormal calcifications. Brain parenchyma: Chronic areas of infarction in both cerebellar hemispheres with 3 small linear lesions on the right and one linear lesion on the left in the posterior cerebellar hemispheres. Low level ischemic changes immediate periventricular white matter. A few small areas of increased signal in the deep and peripheral subcortical white matter of the frontal lobes and mild ill-defined ischemic changes parieto-occipital periventricular and deep white matter. No mass or mass effect. Ventricles and sulci: Ventricle top normal. Lateral ventricles top normal. Mild prominence of sulci in the perisylvian region. Extra axial: No extra axial fluid collection or mass. Brain vasculature: Normal, no arterial vascular abnormality is noted.  Craniocervical Junction: Normal. Extracranial and skull base:  Proptosis by imaging criteria likely from shallow orbits and lipomatosis. Extraocular muscles unremarkable. 1. No acute hemorrhage or acute infarction. 2. Bilateral cerebellar hemisphere small areas of chronic infarction. 3. Very mild atrophy as described with white matter abnormality that is nonspecific but likely ischemic. MRI ANKLE LT WO CONT    Result Date: 8/28/2021  EXAMINATION: MRI ANKLE LT WO CONT CLINICAL INDICATION/HISTORY: ulcer diabetic  >Additional: None TECHNIQUE: T1 weighted sagittal, STIR sagittal, proton FSE axial, fat suppressed T2 FSE axial, fat suppressed T2 FSE coronal, proton FSE oblique axial. COMPARISON EXAMS: January 29, 2020 _______________ FINDINGS: OSSEOUS: There are extensive osseous erosions an bony resorption throughout the posterior inferior calcaneus, markedly progressed in the interval since the prior study with associated bony remodeling and diffuse marrow edema throughout the posterior body. Diminished T1 signal mostly along the outer margins of the bone with more diffuse surrounding marrow edema/T2 hyperintensity. Constellation of findings compatible with acute on chronic osteomyelitis. Associated overlying extensive soft tissue abnormality with poorly defined ulcerative defect, diffuse soft tissue thickening with low T1 and intermediate/increased fluid signal extending to the bone. ANKLE LIGAMENTS AND TENDONS: No acute ligamentous pathology. There is a chronic high-grade, near full-thickness tear of the Achilles tendon, just proximal to its attachment on the posterior calcaneus. Fluid-filled gap spans approximately 2.8 cm proximal distal and sagittal series image 12 with few residual insertional fibers noted. Tendinosis and a longitudinal split tear of the peroneus brevis tendon noted. Medial lateral ankle ligaments are otherwise grossly intact. Extensor tendons also intact.  Mild flexor houses longus tenosynovitis at the knot of Tj Tellez, similar to prior. OTHER: Sinus tarsi: Preserved fat signal. Plantar fascia: Diffusely thickened and distorted, which appears partially disrupted secondary to poorly defined phlegmonous soft tissue edema and thickening throughout the posterior calcaneal fat pad. Overlying skin thickening suggesting cellulitis. Regional soft tissues: No genital fluid collection to suggest abscess. Diffuse edema throughout the intrinsic foot musculature. _______________     1. Extensive cortical erosions with bony resorption and remodeling and associated marrow signal abnormality throughout the posterior calcaneus demonstrating significant interval progression since January 2020 compatible with acute on chronic osteomyelitis. 2.  Associated overlying soft tissue ulceration and phlegmonous changes/granulation tissue along the plantar soft tissues of the hindfoot suggesting cellulitis with associated chronic disruption of the plantar fascia. No drainable abscess. 3.  High-grade partial/near complete tear of the distal ACL. 4.  Peroneus brevis tendinosis with a longitudinal split tear. Initial preliminary report was provided to the ED by the on-call [facility] teleradiology service. CT HEAD WO CONT    Result Date: 8/29/2021  EXAM: CT head INDICATION: Aphasia. COMPARISON: August 26, 2021. TECHNIQUE: Axial CT imaging of the head was performed without intravenous contrast. Dose reduction techniques used: automated exposure control, adjustment of the mAs and/or kVp according to patient size, and iterative reconstruction techniques. Digital imaging and communications in Medicine (DICOM) format image data are available to nonaffiliated external healthcare facilities or entities on a secure, media free, reciprocally searchable basis with patient authorization for at least 12 months after this study.  _______________ FINDINGS: BRAIN AND POSTERIOR FOSSA: The sulci, folia, ventricles and basal cisterns are within normal limits for the patient's age. There is no intracranial hemorrhage, mass effect, or midline shift. There are no areas of abnormal parenchymal attenuation. EXTRA-AXIAL SPACES AND MENINGES: There are no abnormal extra-axial fluid collections. CALVARIUM: Intact. SINUSES: Clear. OTHER: None. _______________     No acute intracranial abnormalities. Findings were discussed with patient's care provider shortly after study performed at approximately 12:13 a.m. August 29, 2021. CT HEAD WO CONT    Result Date: 8/26/2021  EXAM: CT HEAD WO CONT CLINICAL INDICATION/HISTORY: altered with HTN, low BP -Additional: None COMPARISON: 3/2/2018 TECHNIQUE: Axial CT imaging of the head was performed without intravenous contrast. One or more dose reduction techniques were used on this CT: automated exposure control, adjustment of the mAs and/or kVp according to patient size, and iterative reconstruction techniques. The specific techniques used on this CT exam have been documented in the patient's electronic medical record. Digital Imaging and Communications in Medicine (DICOM) format image data are available to nonaffiliated external healthcare facilities or entities on a secure, media free, reciprocally searchable basis with patient authorization for at least a 12-month period after this study. _______________ FINDINGS: BRAIN:   > Brain volume: Age appropriate.   > White matter: Little or no white matter disease. > Infarcts, encephalomalacia: None.   > Parenchymal mass: None.   > Parenchymal hemorrhage: None.   > Midline shift: None.   > Miscellaneous: None. EXTRA-AXIAL SPACES: Unremarkable. No fluid collections. CALVARIUM: Intact. SINUSES, MASTOIDS: Clear. OTHER EXTRACRANIAL: Unremarkable. _______________     1. No CT findings of an acute intracranial abnormality. Please note that noncontrast head CT may be normal in early acute infarct.       CTA HEAD NECK W WO CONT    Result Date: 8/29/2021  EXAM: CTA HEAD AND NECK INDICATION: Aphasia COMPARISON: No prior studies TECHNIQUE:  Multiple axial CT images of the neck were obtained extending from the level of the aortic arch to the skull base after the administration of the IV contrast utilizing a CTA protocol. Maximum intensity projection reconstructions were performed in multiple planes. Multiple axial CT images of the head were obtained extending from below the level of the skull base to the vertex after the administration of the IV contrast utilizing a CTA protocol. Maximum intensity projection reconstructions were performed in three planes. Additional 3 D reconstructions were performed at a separate workstation. One or more dose reduction techniques were used on this CT: automated exposure control, adjustment of the mAs and/or kVp according to patient's size, and iterative reconstruction techniques. The specific techniques utilized on this CT exam have been documented in the patient's electronic medical record. Digital Imaging and Communications in Medicine (DICOM) format image data are available to nonaffiliated external healthcare facilities or entities on a secure, media free, reciprocally searchable basis with patient authorization for at least a 12-month period after this study. ___________________ FINDINGS: CTA NECK Normal variant branching pattern of great vessels along aortic arch is present with a conjoined origin of the innominate artery and left common carotid artery. Atherosclerotic calcifications are present along the aortic arch and the proximal great vessels without evidence high-grade stenosis. The left common carotid is slightly irregular. The left carotid bifurcation is slightly irregular. Estimated minimal luminal diameter proximal left ICA 8.2 mm. Estimated luminal diameter of normal left ICA cervical segment is 5.1 mm. Estimated degree of stenosis of the left ICA based upon NASCET criteria is 0%. Remainder of left ICA cervical segment is unremarkable.  The right common carotid is slightly irregular. The right carotid bifurcation is mildly irregular. Estimated minimal luminal diameter proximal right ICA 7.5 mm. Estimated luminal diameter of normal right ICA cervical segment is 5.3 mm. Estimated degree of stenosis of the right ICA based upon NASCET criteria is 0%. Remainder of right ICA cervical segment is unremarkable. Vertebral arteries are relatively equal in size. Moderate to severe stenosis of the right vertebral artery origin is present. Mild narrowing of the left vertebral artery origin is present. Degenerative changes are seen in the spine. Mild  mucoperiosteal thickening is scattered in the paranasal sinuses. No air-fluid levels are present. Visualized lung apices are clear. CTA HEAD There is no evidence of aneurysm. Mild atherosclerotic narrowing is seen in the bilateral carotid siphons without high grade stenosis. The carotid terminus is unremarkable. The anterior cerebral arteries are unremarkable. An anterior communicating artery is present. The middle cerebral artery bifurcations are unremarkable. The vertebral arteries are relatively equal in size. PICA origins are unremarkable. Tortuosity of the vertebral arteries and vertebral basilar junction is seen. No focal basilar artery stenosis is present. Left P1 segment is mildly hypoplastic with an infundibular type origin. Posterior cerebral arteries are otherwise unremarkable. A posterior communicating artery is bilaterally. The dural venous sinuses are patent. ___________________     1. No definite large vessel occlusion. 2.  No hemodynamically significant ICA stenosis, based on NASCET criteria. 3. Moderate to severe stenosis involving right vertebral artery origin. 4. No high-grade intracranial stenosis. Mild atherosclerotic narrowing of the bilateral carotid siphons.      CT SPINE CERV WO CONT    Result Date: 8/26/2021  EXAM: CT CERVICAL SPINE HISTORY: trauma, fall COMPARISON: none TECHNIQUE: Noncontrast axial CT scan of the cervical spine, utilizing standard departmental technique. Axial and coronal reformations were also provided for improved anatomic evaluation. One or more dose reduction techniques were used on this CT: automated exposure control, adjustment of the mAs and/or kVp according to patient size, and iterative reconstruction techniques. The specific techniques used on this CT exam have been documented in the patient's electronic medical record. Digital Imaging and Communications in Medicine (DICOM) format image data are available to nonaffiliated external healthcare facilities or entities on a secure, media free, reciprocally searchable basis with patient authorization for at least a 12-month period after this study. ______________________ FINDINGS: VERTEBRAE AND DISCS: Vertebral alignment and articulation are within normal limits. Vertebral body and disc space heights are maintained. Specifically, no compression deformities are noted and there is no spondylolisthesis. No displaced fractures are identified. There are no significant areas of bone lucency or sclerosis. SPINAL CANAL AND FORAMINA: Patent without significant bony canal or foramina encroachment. PREVERTEBRAL SOFT TISSUES: Normal. VISIBLE PORTIONS OF POSTERIOR FOSSA/BRAIN: Normal. LUNG APICES: Clear. OTHER: None. __________________    1. No fracture or dislocation. Please note that this CT was performed supine. It is highly sensitive for fractures and dislocations but does not evaluate for ligamentous injury including significant instability. If the patient's symptoms persist or if otherwise clinically indicated, in erect plain film evaluation of the cervical spine is suggested.      XR CHEST PORT    Result Date: 8/27/2021  EXAM: CHEST RADIOGRAPH, SINGLE VIEW CLINICAL INDICATION/HISTORY: Central line placement COMPARISON: 8/26/2021 TECHNIQUE: Portable frontal view of the chest was obtained. _______________ FINDINGS: SUPPORT DEVICES: Interval placement of right subclavian central venous catheter with tip overlying distal SVC. HEART AND MEDIASTINUM: Cardiomediastinal silhouette appears unchanged, mildly enlarged. Tortuous and atherosclerotic thoracic aorta. Mild central vascular congestion. LUNGS AND PLEURAL SPACES: I will diffusely increased interstitial markings. No confluent airspace opacity. No pleural effusion or pneumothorax. BONY THORAX AND SOFT TISSUES: No acute osseous abnormality. _______________     1. Adequate right subclavian central venous catheter placement with no postprocedural complication. 2. Cardiomegaly with central vascular congestion and interstitial pulmonary edema, unchanged. XR CHEST PORT    Result Date: 8/26/2021  EXAM: XR CHEST PORT CLINICAL INDICATION/HISTORY: altered -Additional: Dialysis COMPARISON: 8/20/2021, 12/22/2020 TECHNIQUE: Frontal view of the chest _______________ FINDINGS: HEART AND MEDIASTINUM: Midline prominent cardiac silhouette, overall stable appearance to prior exam. Stable mediastinal contours. Bilateral hilar vascular congestion is present. LUNGS AND PLEURAL SPACES: Mild diffuse bilateral hazy indistinct interstitial markings with perihilar to lower lung predominance. No consolidation, pneumothorax or significant pleural effusion. BONY THORAX AND SOFT TISSUES: Chronic old healed right posterior seventh rib fracture. _______________     1. Plain film findings suggestive of edema edema/atelectasis, favoring fluid overload over CHF given the patient's of dialysis history. XR CHEST PORT    Result Date: 8/20/2021  EXAM: XR CHEST PORT CLINICAL INDICATION/HISTORY: dialysis -Additional: None COMPARISON: 12/22/2020 TECHNIQUE: Portable frontal view of the chest _______________ FINDINGS: SUPPORT DEVICES: None. HEART AND MEDIASTINUM: Cardiomediastinal silhouette within normal limits.  LUNGS AND PLEURAL SPACES: No dense consolidation, large effusion or pneumothorax. _______________     No acute cardiopulmonary abnormality. ECHO ADULT COMPLETE    Result Date: 8/27/2021  · Echo study was limited due to patient's tolerance. · LV: Estimated LVEF is 50 - 55%. Normal cavity size. Moderate concentric hypertrophy. Low normal systolic function. Unable to assess diastolic function due to elevated heart rate. · LA: Mildly dilated left atrium. · RV: Not well visualized. Normal global systolic function. · MV: Mitral valve non-specific thickening. Mitral valve leaflet calcification without reduced excursion. · PA: Mild pulmonary hypertension. Pulmonary arterial systolic pressure is 33 mmHg. ASSESSMENT/IMPRESSION:   Toxic/metabolic encephalopathy. The patient is confused due to metabolic encephalopathy. There is no stroke on the MRI. The treatment involves correcting the underlying metabolic abnormalities including electrolyte abnormalities and hemodialysis for kidney failure.     RECOMMENDATIONS:  1. Continue Aspirin 81mg po daily  2. Telemetry monitoring while in-house  3. Normotensive protocol. 4. Hemodialysis and correction of underlying toxic/metabolic etiologies  5. PT/OT and disposition planning. Neurology signs off at this time. Please do not hesitate to return with any questions.     Signed:  Ashley Zaidi MD  8/31/2021  12:45 PM

## 2021-08-31 NOTE — PROGRESS NOTES
RENAL CONSULT FOLLOW UP NOTE   2021    Patient:  Real Fabian. :  1970  Gender:  male  MRN #:  [id number]    Reason for Consult: Management of Hyperkalemia and Severe metabolic acidosis in setting of ESRD requested by ED physician Dr Trisha Reese     Assessment:  Real Fabian. is a 54y.o. year old male  With h/o ESRD on HD (MWF) , Uncontrolled HTN, DM, CHF with EF 40 % , right BKA status , chronic diarrhea and abdominal discomfort was brought by EMS due to unresponsiveness. He was found to have hypoglycemia requiring dextrose drip initially . He is non compliant with dialysis session and medications . Mostly does not take anti hypertensive meds and comes with uncontrolled HTN in dialysis unit . He had missed dialysis more than 1 week due to not feeling well and diarrhea . He was found to have Hypertensive urgency on admission with hyperkalemia, significant metabolic acidosis and pulmonary vascular congestion in CXR. Initially admitted  in ICU for further care  On insulin , dextrose drip for Hyperkalemia ant transferred in floor after dialysis. Overnight events:   - Reportedly he was confused overnight and become aphasic , there was suspicion stroke  He definitely seems confused, awake but does not follow command   BP is stable for last 24 hours except one episodes of low /77 mmHg yesterday     21- reportedly he remained confused/restless /agiated overnight . Unable to do MRI brain yesterday due to his restless ness . Off Antihypertensive meds , BP stable     21- patient is still confused, aphasic, does not follow command   BP above target range      Plan:      # ESRD- He is still confused and aphasic this could be toxic/metabolic encephalopathy,  Unable to complete full run of dialysis yesterday due to safety concern  .  Labs reviewed , does not have hyperkalemia and acidosis , will dialyze him again today   - consider infectious work up which could be contributing to encephalopathy   - As blood sugar is running low and he is not eating orally , will start D5W 30 cc/hour, give thiamine 100 mg today and 100 mg for 5 more dose    - Intake and output recording   - Dose all meds for ESRD    # Metabolic Acidosis -Improved       # Chronic Diarrhea :- Has h/o C.diff infection in the past .  He has genuine problems of chronic diarrhea for many months. Have seen him going to bathroom every 10 minutes in dialysis unit He mostly complaints of abdominal discomfort which is the reason why he said he miss dialysis  On 8/20 /21: stool showed- Indeterminate for Toxigenic C. Difficile  His symptoms could be due to chronic C.diff infection , may benefit from chronic suppressive therapy or any other intervention   - consider ID consult this admission for this   - Also he mostly complaints of abdominal discomfort and has significant anemia mostly . Not sure if he has chronic GI bleed from colitis   - consider CT abdomen and pelvis once he is more stable prior to discharge and GI consult if he needs colonoscopy due to clinical symptoms of colitis     # Hypertension: -  Antihypertensive meds were on hold per neurology recommendation due to suspicion of acute stroke . MRI is negative for acute infarction  - will resume   coreg 25 mg BID , amlodipine 10 mg , Hydralazine 50 mg TID and Imdur 60 mg    - PRN IV Labetalol 10 mg  if SBP > 180 /90 mmhg         # Anemia: Hemoglobin is below target range   Not iron deficient    EVIE during dialysis   Iron profile     # BMD- Ionized calcium is borderline low   Phos  is high- continue  sevelamer 1600 mg TID if he takes oral pills   Secondary Hyperparathyroidism, Elevated PTH- continue  Zemplar 4 mcg during HD.   Run on 3 mmol/lt calcium bath       Rest of the medical management per primary team       Subjective:   - He is wake but confused ,aphasic , does not follow any commands   - BP elevated   - Breathing is fine in room air.     -    Intake/Output Summary (Last 24 hours) at 8/31/2021 1446  Last data filed at 8/31/2021 0026  Gross per 24 hour   Intake 145 ml   Output    Net 145 ml         Objective:    Visit Vitals  BP (!) 175/92   Pulse 86   Temp 98.2 °F (36.8 °C) (Axillary)   Resp 16   Ht 5' 7\" (1.702 m)   Wt 76.2 kg (168 lb)   SpO2 100%   BMI 26.31 kg/m²       Physical Exam:    Pt awake  Lung: clear to auscultation    Ext: RT- BKA status , left - no edema, heel ulcer/infection    Laboratory Data:    Lab Results   Component Value Date    BUN 55 (H) 08/31/2021    BUN 73 (H) 08/30/2021    BUN 58 (H) 08/29/2021     08/31/2021     08/30/2021     08/29/2021    CO2 24 08/31/2021    CO2 22 08/30/2021    CO2 24 08/29/2021     Lab Results   Component Value Date    WBC 5.2 08/30/2021    HGB 11.2 (L) 08/30/2021    HCT 32.3 (L) 08/30/2021     No components found for: CALCIUM, PHOSPHORUS, MAGNESIUM  Lab Results   Component Value Date    HDL 45 08/30/2021     No results found for: SPECIMENTYP, TURBIDITY, UGLU    Imaging Reveiwed:    CXR 8/27/21- 1. Adequate right subclavian central venous catheter placement with no  postprocedural complication. 2. Cardiomegaly with central vascular congestion and interstitial pulmonary  edema, unchanged. 8/29/21: CTA head; No definite large vessel occlusion.   No hemodynamically significant ICA stenosis, based on NASCET criteria. Moderate to severe stenosis involving right vertebral artery origin.   No high-grade intracranial stenosis. Mild atherosclerotic narrowing of the  bilateral carotid siphons.     MRI brain: 8/31/21- No acute hemorrhage or acute infarction. 2. Bilateral cerebellar hemisphere small areas of chronic infarction.   3. Very mild atrophy as described with white matter abnormality that is  nonspecific but likely ischemic    Ronald Glover MD   [facility].- Nephrology

## 2021-08-31 NOTE — PROGRESS NOTES
Hospitalist Progress Note    Patient: Som Yi. MRN: [id number]  [facility]: [id number]    YOB: 1970  Age: 54 y.o. Sex: male    DOA: 8/26/2021 LOS:  LOS: 3 days            Chief complaint :  Hypoglycemia, hypertensive urgency. Assessment/Plan     Patient Active Problem List   Diagnosis Code    Hyponatremia E87.1    Anemia of chronic disease D63.8    Acute on chronic renal insufficiency N28.9, N18.9    Acute exacerbation of CHF (congestive heart failure) ([facility]) I50.9    Iron deficiency anemia D50.9    Other restrictive cardiomyopathy ([facility] I42.5    Dehydration E86.0    ESRD (end stage renal disease) on dialysis ([facility] N18.6, Z99.2    Cellulitis and abscess of foot L03.119, L02.619    HTN (hypertension) I10    Acute osteomyelitis ([facility] M86.10    Hx of BKA, right ([facility] Z89.511    Esophagitis K20.90    Hypoglycemia E16.2    Hypertensive urgency I16.0    Abdominal pain with vomiting R10.9, R11.10    Diarrhea R19.7    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    Hyperkalemia E87.5    Hypertensive crisis I16.9    Uremia N19    Hypoglycemia associated with diabetes ([facility]) E11.649    Non-compliance with renal dialysis ([facility] Z91.15    Stroke [facility]) I63.9          54 y.o.  male who has history of chronic kidney disease on dialysis Monday Wednesday Friday, right BKA, diabetes, congestive heart failure last EF 40%, hypertension, sickle cell trait presents to the emergency room via EMS was found unresponsive. Hypoglycemia -  Resolved    ESRD -  HD per nephrology. Chronic diarrhea -  Now improving  Follow repeat C-diff. Toxic metabolic encephalopathy-  Seen by neurology  Continue with aspirin  Follow MRI  Permissive HTN until ruled out. Heel ulcer -  Seen by podiatry  Antibiotics - levaquin, cefepime. Disposition : TBD    Review of systems  General: No fevers or chills. Cardiovascular: No chest pain or pressure. No palpitations. Pulmonary: No shortness of breath. Gastrointestinal: No nausea, vomiting. Physical Exam:  General: Awake, not answering to questions.    HEENT: NC, Atraumatic. PERRLA, anicteric sclerae. Lungs: CTA Bilaterally. No Wheezing/Rhonchi/Rales. Heart:  S1 S2,  No murmur, No Rubs, No Gallops  Abdomen: Soft, Non distended, Non tender.  +Bowel sounds,   Extremities: No c/c/e  Psych:   Not anxious or agitated. Neurologic:  No acute neurological deficit. Vital signs/Intake and Output:  Visit Vitals  BP (!) (P) 186/85   Pulse (P) 91   Temp (P) 97.3 °F (36.3 °C)   Resp (P) 18   Ht 5' 7\" (1.702 m)   Wt 78.9 kg (174 lb)   SpO2 (P) 97%   BMI 27.25 kg/m²     Current Shift:  No intake/output data recorded. Last three shifts:  08/29 0701 - 08/30 1900  In: 510 [P.O.:500; I.V.:10]  Out: 0             Labs: Results:       Chemistry Recent Labs     08/30/21 0250 08/29/21 0120 08/28/21  0704 08/28/21  0703 08/28/21  0703   GLU 74 89  --   --  80    137  --   --  139   K 4.5 4.3  --   --  5.2    103  --   --  107   CO2 22 24  --   --  17*   BUN 73* 58*  --   --  95*   CREA 14.00* 11.50*  --   --  15.60*   CA 7.9* 7.0* 6.9*   < > 6.6*   AGAP 14 10  --   --  15   BUCR 5* 5*  --   --  6*   AP 73 79  --   --  73   TP 8.0 7.5  --   --  7.2   ALB 3.0* 2.6*  --   --  2.5*   GLOB 5.0* 4.9*  --   --  4.7*   AGRAT 0.6* 0.5*  --   --  0.5*    < > = values in this interval not displayed. CBC w/Diff Recent Labs     08/30/21 0250 08/29/21 0120 08/28/21  0703   WBC 5.2 4.4* 4.7   RBC 4.30* 4.39 4.01*   HGB 11.2* 11.2* 10.5*   HCT 32.3* 32.8* 30.8*    125* 119*   GRANS 61 55 60   LYMPH 12* 15* 16*   EOS 5 6* 4      Cardiac Enzymes No results for input(s): CPK, CKND1, WILLIE in the last 72 hours. No lab exists for component: CKRMB, TROIP   Coagulation No results for input(s): PTP, INR, APTT, INREXT in the last 72 hours.     Lipid Panel Lab Results   Component Value Date/Time    Cholesterol, total 146 08/30/2021 02:50 AM    HDL Cholesterol 45 08/30/2021 02:50 AM    LDL, calculated 81 08/30/2021 02:50 AM    VLDL, calculated 20 08/30/2021 02:50 AM    Triglyceride 100 08/30/2021 02:50 AM    CHOL/HDL Ratio 3.2 08/30/2021 02:50 AM      BNP No results for input(s): BNPP in the last 72 hours.    Liver Enzymes Recent Labs     08/30/21  0250   TP 8.0   ALB 3.0*   AP 73      Thyroid Studies Lab Results   Component Value Date/Time    TSH 2.38 08/29/2021 01:20 AM        Procedures/imaging: see electronic medical records for all procedures/Xrays and details which were not copied into this note but were reviewed prior to creation of Plan

## 2021-08-31 NOTE — DIABETES MGMT
GLYCEMIC CONTROL PLAN OF CARE        Diabetes Management:      Assessment: known h/o ESRD HD T2DM, HbA1C may not be reflective of GC  - admitted for hypoglycemia; found to be unresponsive  - insulin gtt initiated on admission to address hyperkalemia    Recommend: hypoglycemic episodes x3, most likely r/t lack of PO intake; increase frequency of BG monitoring   - per RN pt too lethargic for PO intake at this time  - BG monitoring increased to q4h  - D5 IVF 30cc/hr    BG in target range (non-ICU\" < 180 ; -180):  [] Yes  [x] No      Current Insulin regimen: - Humalog Normal Insulin Sensitivity Corrective Coverage    TDD previous day = 0    Most recent blood glucose results:   Lab Results   Component Value Date/Time    GLU 68 (L) 08/31/2021 03:15 AM    GLUCPOC 129 (H) 08/31/2021 05:18 AM    GLUCPOC 67 (L) 08/31/2021 04:55 AM    GLUCPOC 78 08/31/2021 12:24 AM         Hypo: yes    HbA1C: equivalent  to ave BGlucose of 88 mg/dl for 2-3 months prior to admission   Lab Results   Component Value Date/Time    Hemoglobin A1c 4.8 08/29/2021 01:02 AM       Adequate glycemic control PTA:  [x] Yes  [] No      Home diabetes medications:  Key Antihyperglycemic Medications     Patient is on no antihyperglycemic meds. Goals:  Blood glucose will be within target range of 70 - 180 mg/dL by: 9/15    Diet:   Active Orders   Diet    ADULT DIET Regular; 4 carb choices (60 gm/meal); Low Fat/Low Chol/High Fiber/AB; No Salt Added (3-4 gm); Low Potassium (Less than 3000 mg/day);  Low Phosphorus (Less than 1000 mg); 60 to 80 gm       Patient Vitals for the past 100 hrs:   % Diet Eaten   08/29/21 1414 26 - 50%   08/29/21 1110 26 - 50%   08/28/21 1900 76 - 100%        Education:  _____ Refer to Diabetes Education Record                       ___X__ Education not indicated at this time    Aditya Jacobo MS, RN, CDE  Glycemic Control Team  [phone]  Pager [phone] (M-TH 8:00-4:30P)  *After Hours pager [phone]

## 2021-08-31 NOTE — ROUTINE PROCESS
Bedside and Verbal shift change report given to Shyanne Cook RN (oncoming nurse) by Haley Ly RN (offgoing nurse).  Report included the following information SBAR, Kardex, Intake/Output, MAR, Recent Results, and Cardiac Rhythm: SR.

## 2021-08-31 NOTE — PROGRESS NOTES
TREATMENT SUMMARY   Arrived to dialyze patient at [phone], he was off the floor for a test.  Received report from Papa Vogt at [phone]. Patient had to be settled in room, was ready for HD at 1200. Patient is verbally responsive today, disoriented to situation and time. LUE AVG positive bruit and thrill present. No s/s of infection noted. Same prepped aseptically as per protocol. Difficult cannulation due to patient moving arm. Cannulate venous portion of LUE AVG x 2 fistula needles. Arterial aspect cannulated X 1 fistula needle. Tx initiated at 1247. Patient in room 343 dialyzed for 3hours. Tolerated tx well with without complaint or complications. LUE AVG functioning without complication accessing or BFR   mL/min   mL/min  1000 ml UF removed with a net UF removal of 500 ml. Bed in loest position, call bell within reach. Report given Jose Mares RN with all questions answered. TREATMENT NOTES                                                                                                ACUTE HEMODIALYSIS FLOW SHEET       PATIENT INFORMATION   [address]       DR. Alston    []1st Time Acute  []Stat[x] Routine []Urgent []Chronic Unit   []Acute Room [x]Bedside  []ICU/CCU []ER   Isolation Precautions: [x]Dialysis[] Airborne [x]Contact []Droplet []Reverse    Special Considerations:_______  [] Blood Consent Verified  [x]N/A   Allergies:[] NKA                 [x] ___Penicillins PenicillinsRashNot Kvaebktgv06/22/2020Deletion Reason: Vancomycin VancomycinOther (comments)Not Specified1/29/2020redman syndromeDeletion Reason: Duyen pendleton ReviewedLast Reviewed by Gerhardt Kalata, RN on 8/26/2021 at  6:27 PM: Fully ReviewedPast Reviews__________ Code Status [x]Full Code [] DNR  [] Other_____   Diet: [x] Renal [] NPO [x] Diabetic   [] Enteral Feeding [] Cardiac Diabetic: [x]Yes []No     [x]Signed Treatment Consent Verified   [x] Time Out/ Safety Check   PRIMARY NURSE REPORT: FIRST INITIAL/ LAST NAME/TITLE  PRE DIALYSIS:      Alia Spears                                     TIME: 0   ACCESS   CATHETER ACCESS: [x] N/A  [] RIGHT  [] LEFT  [] IJ  [] SUBCL [] FEM                    [] First use X-ray  [] Tunnel     [] Non-Tunneled      [] No S/S infection  [] Redness [] Drainage  [] Cultured [] Swelling [] Pain                    [] Medical Aseptic [] Prep Dressing Changed                  [] Clotted [] Patent []      Flows: [] Good [] Poor [] Reversed                 If Access Problem Dr. Aidan Boles: [] Yes [] No    Date:_____  [] N/A[]   GRAFT/FISTULA ACCESS:  [] N/A  [] RIGHT  [x] LEFT  [x] UE   [] LE       [x] AVG  [] AVF [] BUTTONHOLE    [x] +BRUIT/THRILL [x] MEDICAL ASEPTIC PREP     [x] No S/S infection  [] Redness [] Drainage  [] Cultured [] Swelling [] Pain              If Access Problem Dr. Aidan Boles: [] Yes [] No    Date:______ [x] N/A   GENERAL ASSESSMENT   LUNGS:  SaO2% 100____ [x] Clear [] Coarse [] Crackles [] Wheezing               [] Diminished Location: [] RLL [] LLL [x] RUL [] KYLAH    COUGH:  [] Productive  [] Loose[] Dry [x] N/A  RESPIRATIONS: [x] Easy [] Labored    THERAPY: [x] RA   [] NC _____.  L/min    Mask: [] NRB [] Venti  _____O2%                  [] Ventilator [] Intubated [] Trach [] BiPap [] CPap [] HI Flow   CARDIAC: [x] Regular [] Irregular [] Pericardial Rub [] JVD               Monitored Rhythm:______ [] N/A   EDEMA: [x] None [] Generalized [] Facial [] Pedal [] UE [] LE             [] Pitting [] 1 [] 2 [] 3 [] 4    [] Right [] Left [] Bilateral   SKIN:    [x] Warm [] Hot [] Cold  [x] Dry [] Pale [] Diaphoretic              [] Flushed [] Jaundiced [] Cyanotic [] Rash [] Weeping     LOC:    [x] Alert  Oriented to: [x] Person [x] Place [] Time             [] Confused [] Lethargic [] Medicated [] Non-responsive    GI/ABDOMEN: [] Flat [] Distended [x] Soft [] Firm [] Diarrhea [x] Bowel Sounds Present [] Nausea [] Vomiting    PAIN: [x] 0 [] 1 [] 2 [] 3 [] 4 [] 5 [] 6 [] 7 [] 8 [] 9 [] 10          Scale 1-10 Action/Follow Up_____   MOBILITY: [] Amb [] Amb/Assist [x] Bed  [] Wheelchair    CURRENT LABS   HBsAg ONLY: Date Drawn:  08/27/2021           [x] Negative [] Positive [] Unknown. HBsAb: Date Drawn:    08/27/2021          [x] Susceptible <10 [] Immune ?10 [] Unknown   Date of Current Labs:  ATTACHED     EDUCATION   Person Educated: [x] Patient [] Other_________   Knowledge base: [] None [] Minimal [] Substantial    Barriers to learning  [] None ____Not oriented to situation___________   Preferred method of learning: [] Written [x] Oral [x] Visual [] Hands on    Topic: [x] Access Care [] S&S of infection [x] Fluid Management   [x] K+  [x] Procedural  [] Albumin [] Medications [] Tx Options   [] Transplant [] Diet [] Other    Teaching Tools: [x] Explain [] Demonstration [] Handout_____ [] Video______  CARE PLAN    [x] Renal Failure (Adult)  Interdisciplinary  · Fluid and electrolytes stabilized  ? Interventions  · Dehydration signs and symptoms (eg: Weight/lab monitoring; vomiting/diarrhea/urine; tenting; mucous membranes; dizziness/lethargy/irritability/confusion; weak pulse; tachycardia; blood pressure; I&O)  · Fluid overload signs and symptoms assessment (eg: Body weight increased; dyspnea; edema; hypertension; respiratory crackles/wheezing; JVD; lab monitoring; mental status changes; I&O)  · Monitor appropriate lab values  · COMPLIANCE WITH PRESCRIBED THERAPY  · ARTERIAL ACCESS SITE ASSESSMENT  · NUTRITION SCREENING  · Vital signs monitoring per assessed patient condition or unit standard  · Cardiac monitoring  · Hydration management  · Intake and output measurement  · Body weight monitoring  · Skin care  · DIALYSIS  · Nutrition Care Process per nutrition screen  · Oral hygiene care every 2 hours  · Pain management     · Outcome   ? [x] Progressing Towards Goal  ? [] Not Progressing Towards Goals  ? [] Goals Met/Resolved  ?  [] Goals Not Met/ Resolved        · Patient/ Family Education  ? Progressing Towards Goals          RO/HEMODIAYLSIS MACHINE SAFETY CHECKS- BEFORE EACH TREATMENT          [] [facility]: Machine Serial #1:  [id number]   RO Serial #1: [id number]                       [] [facility]: Machine Serial #2:  [id number]    Serial #2: [id number]        [x] [facility]: Machine Serial #3:  [id number]    Serial #3:[id number]    Alarm Test: [x] Pass  Time__1217______  [x] RO/Machine Log Complete    [x] Extracorporeal circuit Tested for integrity           Dialyzer__[id number]________   Tubing____20I23-11________    Dialysate: pH_7.4__  Temp.__36__Conductivity: Meter __14__ HD Machine__13.9_   CHLORINE TESTING- BEFORE EACH TREATMENT AND EVERY 4 HOURS   Total Chlorine: [] Less than 0.1 ppm Time:_____2nd Check Time:______  (If greater than 0.1 ppm from Primary then every 30 minutes from Secondary)   TREATMENT INIATION-WITH DIALYSIS PRECAUTIONS   [x] All Connections Secured   [x] Saline Line Double Clamped    [x] Venous Parameters Set [x] Arterial Parameters Set    [x] Prime Given 250 ml     [x] Air Foam Detector Engaged   PRE-TREATMENT   UF Calculations: Wt to lose:__500__ml(+) Oral:__ml(+)IV Meds/Fluids/Blood prods___ml(+) Prime/Rinse_500__ml(=)Total UF Goal__1000__mL   Scale Type:[x] Bed scale [] Sling Scale [] Wheel Chair Scale []  Not Ordered [] [] Unable to obtain pt on stretcher/ bed scale malfunctioning   Tx Initiation Note:  Received patient in bed semi fowlers position. Left upper arm AVG positive bruit and thrill present. No s/s of infection noted. Same prepped aseptically and cannulated. Treatment initiated as per MD order without incident. Plan to remove 0.5 liters for 3 hours while monitoring patient's well-being and vital signs.    [x] Time Out/Safety Check  Time: 1245_____   INTRADIALYTIC MONITORING  (SEE ATTACHED FLOWSHEET)     POST TREATMENT    Time Medication Dose Volume Route    Initials                                           Carylita Signatures Title Initials Time Dialyzer cleared: [] Good [x] Fair [] Poor     Blood Processed _53.9__Liters    Net UF Removed __500_mL  Post Tx Access:                  AVF/AVG: Bleeding Stop       Art._10__min Chaim._10__min []+bruit/thrill                Catheter: Locking Solution [] Heparin 1 ml/1000 units  [] Normal Saline                                                                               Art._____ ml Chaim._____ml  Post Assessment:              Skin: [x]Warm [x]Dry []Diaphoretic []Flushed [] Pale []Cyanotic            Lungs: []Clear []Coarse []Crackles []Wheezing             Cardiac: [x]Regular []Irregular  []Monitored rhythm____ []N/A            Edema: [x]None []General []Facial []Pedal  []UE []LE []RIGHT []LEFT            Pain: [x]0 []1 []2 []3 []4 []5 []6 []7 []8 []9 []10   POST Tx Note:    HD treatment completed and tolerated well. Patient rinsed with 250 mL 0.9% N/S. Fistula needles removed, post bleeding WNL. Gauze/Tape dressing applied to puncture site. No active bleeding noted. Pt. remains in bed 343 in stable condition, freely breathing room air. No acute complaints or distress noted. Primary Nurse Report: First initial/Last name/Title    Post Dialysis:____A.  Regan RN______________________         Time:___1643_____________    Abbreviations: AVG-arterial venous graft, AVF-arterial venous fistula, IJ-Internal Jugular,  Subcl-Subclavian, Fem-Femoral, Tx-treatment, AP/HR-apical heart rate, DFR-dialysate flow rate, BFR-blood flow rate, AP-arterial pressure, -venous pressure, UF-ultrafiltrate, TMP-transmembrane pressure, Chaim-Venous, Art-Arterial, RO-Reverse Osmosis

## 2021-08-31 NOTE — PROGRESS NOTES
2042 Point of Care test showed blood glucose 69. Dr. Ami Calvo was notified and D50W given as ordered. 15 min check showed blood glucose 111.    0241 Patient was holding his head at the left eye and moaning. His left leg began kicking rhythmically for approx. 30 sec. He continued to moan and grunt, then began shaking his fist repetitively. His arm and leg both began shaking, then his whole body seizure-like for approx. 1 minute. Dr. Ami Calvo was notified. 0455 POC test showed blood glucose 67. D50W given as ordered. 15 min check showed blood glucose 129. Shift summary: Despite the seizure-like activity, there is an improvement in the patient's mental status, as he is focusing when spoken to and making purposeful eye contact and attempting to communicate, as well as following some commands.

## 2021-08-31 NOTE — PROGRESS NOTES
Hospitalist Progress Note    Patient: Jim Keane MRN: [id number]  CSN: [id number]    YOB: 1970  Age: 54 y.o. Sex: male    DOA: 8/26/2021 LOS:  LOS: 4 days            Chief complaint :  Hypoglycemia, hypertensive urgency. Improved  ?seizure vs hypoglycemia yesterday   Check EEG if ok with neuro    Assessment/Plan     Patient Active Problem List   Diagnosis Code    Hyponatremia E87.1    Anemia of chronic disease D63.8    Acute on chronic renal insufficiency N28.9, N18.9    Acute exacerbation of CHF (congestive heart failure) ([facility]) I50.9    Iron deficiency anemia D50.9    Other restrictive cardiomyopathy ([facility] I42.5    Dehydration E86.0    ESRD (end stage renal disease) on dialysis ([facility] N18.6, Z99.2    Cellulitis and abscess of foot L03.119, L02.619    HTN (hypertension) I10    Acute osteomyelitis ([facility] M86.10    Hx of BKA, right ([facility] Z89.511    Esophagitis K20.90    Hypoglycemia E16.2    Hypertensive urgency I16.0    Abdominal pain with vomiting R10.9, R11.10    Diarrhea R19.7    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    Hyperkalemia E87.5    Hypertensive crisis I16.9    Uremia N19    Hypoglycemia associated with diabetes ([facility]) E11.649    Non-compliance with renal dialysis ([facility] Z91.15    Stroke [facility]) I63.9          54 y.o.  male who has history of chronic kidney disease on dialysis Monday Wednesday Friday, right BKA, diabetes, congestive heart failure last EF 40%, hypertension, sickle cell trait presents to the emergency room via EMS was found unresponsive. Hypoglycemia -  69 episode with jerks yesterday ? Seizure from hypoglycemia no humolog or lantus     ESRD -  HD per nephrology. Chronic diarrhea -  Now improving  repeat C-diff. Negative molecularly antigens  EB Panel normal      Toxic metabolic encephalopathy-  Seen by neurology  Continue with aspirin  Follow MRI  Permissive HTN until ruled out.      Heel ulcer -  Seen by podiatry  Antibiotics - levaquin, cefepime. Seizure like activity  Check EEG if ok with neuro  Appreciate consult  MRI ordered     Pneumoia on xray  Cefipime /Levaquin     Disposition : TBD    Review of systems  General: No fevers or chills. Cardiovascular: No chest pain or pressure. No palpitations. Pulmonary: No shortness of breath. Gastrointestinal: No nausea, vomiting. Physical Exam:  General: Awake, not answering to questions.    HEENT: NC, Atraumatic. PERRLA, anicteric sclerae. Lungs: CTA Bilaterally. No Wheezing/Rhonchi/Rales. Heart:  S1 S2,  No murmur, No Rubs, No Gallops  Abdomen: Soft, Non distended, Non tender.  +Bowel sounds,   Extremities: No c/c/e  Psych:   Not anxious or agitated. Neurologic:  No acute neurological deficit. Vital signs/Intake and Output:  Visit Vitals  BP (!) 148/88   Pulse 85   Temp 98.1 °F (36.7 °C)   Resp 16   Ht 5' 7\" (1.702 m)   Wt 76.2 kg (168 lb)   SpO2 100%   BMI 26.31 kg/m²     Current Shift:  No intake/output data recorded. Last three shifts:  08/29 1901 - 08/31 0700  In: 155 [I.V.:155]  Out: 0             Labs: Results:       Chemistry Recent Labs     08/31/21  0315 08/30/21  0250 08/29/21  0120   GLU 68* 74 89    139 137   K 4.4 4.5 4.3    103 103   CO2 24 22 24   BUN 55* 73* 58*   CREA 12.20* 14.00* 11.50*   CA 8.5 7.9* 7.0*   AGAP 12 14 10   BUCR 5* 5* 5*   AP  --  73 79   TP  --  8.0 7.5   ALB  --  3.0* 2.6*   GLOB  --  5.0* 4.9*   AGRAT  --  0.6* 0.5*      CBC w/Diff Recent Labs     08/30/21  0250 08/29/21  0120   WBC 5.2 4.4*   RBC 4.30* 4.39   HGB 11.2* 11.2*   HCT 32.3* 32.8*    125*   GRANS 61 55   LYMPH 12* 15*   EOS 5 6*      Cardiac Enzymes No results for input(s): CPK, CKND1, WILLIE in the last 72 hours. No lab exists for component: CKRMB, TROIP   Coagulation No results for input(s): PTP, INR, APTT, INREXT, INREXT in the last 72 hours.     Lipid Panel Lab Results   Component Value Date/Time    Cholesterol, total 146 08/30/2021 02:50 AM    HDL Cholesterol 45 08/30/2021 02:50 AM    LDL, calculated 81 08/30/2021 02:50 AM    VLDL, calculated 20 08/30/2021 02:50 AM    Triglyceride 100 08/30/2021 02:50 AM    CHOL/HDL Ratio 3.2 08/30/2021 02:50 AM      BNP No results for input(s): BNPP in the last 72 hours.    Liver Enzymes Recent Labs     08/30/21  0250   TP 8.0   ALB 3.0*   AP 73      Thyroid Studies Lab Results   Component Value Date/Time    TSH 2.38 08/29/2021 01:20 AM        Procedures/imaging: see electronic medical records for all procedures/Xrays and details which were not copied into this note but were reviewed prior to creation of Plan

## 2021-08-31 NOTE — PROGRESS NOTES
Problem: Falls - Risk of  Goal: *Absence of Falls  Description: Document Bernice Awad Fall Risk and appropriate interventions in the flowsheet. Outcome: Progressing Towards Goal  Note: Fall Risk Interventions:  Mobility Interventions: Bed/chair exit alarm    Mentation Interventions: Bed/chair exit alarm, Door open when patient unattended    Medication Interventions: Bed/chair exit alarm    Elimination Interventions: Bed/chair exit alarm              Problem: Patient Education: Go to Patient Education Activity  Goal: Patient/Family Education  Outcome: Progressing Towards Goal     Problem: Risk for Spread of Infection  Goal: Prevent transmission of infectious organism to others  Description: Prevent the transmission of infectious organisms to other patients, staff members, and visitors. Outcome: Progressing Towards Goal     Problem: Patient Education:  Go to Education Activity  Goal: Patient/Family Education  Outcome: Progressing Towards Goal     Problem: Chronic Renal Failure  Goal: *Fluid and electrolytes stabilized  Outcome: Progressing Towards Goal     Problem: Patient Education: Go to Patient Education Activity  Goal: Patient/Family Education  Outcome: Progressing Towards Goal     Problem: Pressure Injury - Risk of  Goal: *Prevention of pressure injury  Description: Document Tucker Scale and appropriate interventions in the flowsheet.   Outcome: Progressing Towards Goal  Note: Pressure Injury Interventions:  Sensory Interventions: Assess changes in LOC         Activity Interventions: Pressure redistribution bed/mattress(bed type)    Mobility Interventions: Pressure redistribution bed/mattress (bed type)    Nutrition Interventions: Document food/fluid/supplement intake, Discuss nutritional consult with provider    Friction and Shear Interventions: Foam dressings/transparent film/skin sealants, HOB 30 degrees or less                Problem: Patient Education: Go to Patient Education Activity  Goal: Patient/Family Education  Outcome: Progressing Towards Goal

## 2021-08-31 NOTE — PROGRESS NOTES
Intervention: Monitor/Manage Fluid Electrolyte/Acid Base Balance    PROBLEM: HEMODIALYSIS ADULT    INTERVENTION: Hemodynamic stabilization  Maintain BP WNL for this patient while on hemodialysis    INTERVENTION: Fluid Management  Will attempt 500 ml total fluid removal as tolerated    INTERVENTION: Metabolic / Electrolyte Imbalance Management  3 K+ potassium, 2.5 Ca calcium bath today with dialysis    GOAL: Signs and symptoms of listed potential problems will be absent or manageable  (reference Hemodialysis ADULT CPG)    OUTCOME: Progressing  HD planned for 3 hours today      Problem: Chronic Renal Failure  Goal: *Fluid and electrolytes stabilized  8/31/2021 1459 by Sheila Macedo RN  Outcome: Progressing Towards Goal     Problem: Patient Education: Go to Patient Education Activity  Goal: Patient/Family Education  8/31/2021 1459 by Sheila Macedo RN  Outcome: Progressing Towards Goal  8/31/2021 1313 by Sheila Macedo RN  Outcome: Progressing Towards Goal

## 2021-09-01 ENCOUNTER — APPOINTMENT (OUTPATIENT)
Dept: CT IMAGING | Age: 51
DRG: 640 | End: 2021-09-01
Attending: INTERNAL MEDICINE
Payer: MEDICARE

## 2021-09-01 LAB
ANION GAP SERPL CALC-SCNC: 8 MMOL/L (ref 3–18)
BUN SERPL-MCNC: 32 MG/DL (ref 7–18)
BUN/CREAT SERPL: 4 (ref 12–20)
CALCIUM SERPL-MCNC: 7.9 MG/DL (ref 8.5–10.1)
CHLORIDE SERPL-SCNC: 101 MMOL/L (ref 100–111)
CO2 SERPL-SCNC: 27 MMOL/L (ref 21–32)
CREAT SERPL-MCNC: 8.42 MG/DL (ref 0.6–1.3)
GLUCOSE BLD STRIP.AUTO-MCNC: 103 MG/DL (ref 70–110)
GLUCOSE BLD STRIP.AUTO-MCNC: 113 MG/DL (ref 70–110)
GLUCOSE BLD STRIP.AUTO-MCNC: 115 MG/DL (ref 70–110)
GLUCOSE BLD STRIP.AUTO-MCNC: 91 MG/DL (ref 70–110)
GLUCOSE BLD STRIP.AUTO-MCNC: 96 MG/DL (ref 70–110)
GLUCOSE SERPL-MCNC: 94 MG/DL (ref 74–99)
MAGNESIUM SERPL-MCNC: 2.1 MG/DL (ref 1.6–2.6)
PHOSPHATE SERPL-MCNC: 5.8 MG/DL (ref 2.5–4.9)
POTASSIUM SERPL-SCNC: 3.9 MMOL/L (ref 3.5–5.5)
SODIUM SERPL-SCNC: 136 MMOL/L (ref 136–145)

## 2021-09-01 PROCEDURE — 71275 CT ANGIOGRAPHY CHEST: CPT

## 2021-09-01 PROCEDURE — 80048 BASIC METABOLIC PNL TOTAL CA: CPT

## 2021-09-01 PROCEDURE — 74011250636 HC RX REV CODE- 250/636: Performed by: STUDENT IN AN ORGANIZED HEALTH CARE EDUCATION/TRAINING PROGRAM

## 2021-09-01 PROCEDURE — 74011000250 HC RX REV CODE- 250: Performed by: INTERNAL MEDICINE

## 2021-09-01 PROCEDURE — 65660000000 HC RM CCU STEPDOWN

## 2021-09-01 PROCEDURE — 74011000636 HC RX REV CODE- 636: Performed by: INTERNAL MEDICINE

## 2021-09-01 PROCEDURE — 83735 ASSAY OF MAGNESIUM: CPT

## 2021-09-01 PROCEDURE — 82962 GLUCOSE BLOOD TEST: CPT

## 2021-09-01 PROCEDURE — 74011250637 HC RX REV CODE- 250/637: Performed by: INTERNAL MEDICINE

## 2021-09-01 PROCEDURE — 74011250637 HC RX REV CODE- 250/637: Performed by: PSYCHIATRY & NEUROLOGY

## 2021-09-01 PROCEDURE — 74011250637 HC RX REV CODE- 250/637: Performed by: STUDENT IN AN ORGANIZED HEALTH CARE EDUCATION/TRAINING PROGRAM

## 2021-09-01 PROCEDURE — 74011250636 HC RX REV CODE- 250/636: Performed by: FAMILY MEDICINE

## 2021-09-01 PROCEDURE — 84100 ASSAY OF PHOSPHORUS: CPT

## 2021-09-01 PROCEDURE — C9113 INJ PANTOPRAZOLE SODIUM, VIA: HCPCS | Performed by: INTERNAL MEDICINE

## 2021-09-01 PROCEDURE — 74177 CT ABD & PELVIS W/CONTRAST: CPT

## 2021-09-01 PROCEDURE — 74011250636 HC RX REV CODE- 250/636: Performed by: INTERNAL MEDICINE

## 2021-09-01 RX ORDER — LOSARTAN POTASSIUM 50 MG/1
50 TABLET ORAL DAILY
Status: DISCONTINUED | OUTPATIENT
Start: 2021-09-02 | End: 2021-09-06 | Stop reason: HOSPADM

## 2021-09-01 RX ADMIN — IOPAMIDOL 100 ML: 755 INJECTION, SOLUTION INTRAVENOUS at 11:52

## 2021-09-01 RX ADMIN — SEVELAMER CARBONATE 1600 MG: 800 TABLET, FILM COATED ORAL at 13:00

## 2021-09-01 RX ADMIN — WATER 1 G: 1 INJECTION INTRAMUSCULAR; INTRAVENOUS; SUBCUTANEOUS at 17:37

## 2021-09-01 RX ADMIN — CARVEDILOL 25 MG: 12.5 TABLET, FILM COATED ORAL at 13:00

## 2021-09-01 RX ADMIN — AMLODIPINE BESYLATE 10 MG: 5 TABLET ORAL at 13:00

## 2021-09-01 RX ADMIN — ISOSORBIDE MONONITRATE 60 MG: 60 TABLET, EXTENDED RELEASE ORAL at 13:00

## 2021-09-01 RX ADMIN — ASPIRIN 81 MG: 81 TABLET, COATED ORAL at 13:00

## 2021-09-01 RX ADMIN — DIPHENHYDRAMINE HYDROCHLORIDE 12.5 MG: 50 INJECTION, SOLUTION INTRAMUSCULAR; INTRAVENOUS at 06:19

## 2021-09-01 RX ADMIN — PANTOPRAZOLE SODIUM 40 MG: 40 INJECTION, POWDER, FOR SOLUTION INTRAVENOUS at 04:35

## 2021-09-01 RX ADMIN — CARVEDILOL 25 MG: 12.5 TABLET, FILM COATED ORAL at 17:37

## 2021-09-01 RX ADMIN — THIAMINE HYDROCHLORIDE 100 MG: 100 INJECTION, SOLUTION INTRAMUSCULAR; INTRAVENOUS at 14:09

## 2021-09-01 RX ADMIN — SEVELAMER CARBONATE 1600 MG: 800 TABLET, FILM COATED ORAL at 17:36

## 2021-09-01 RX ADMIN — ONDANSETRON 4 MG: 2 INJECTION INTRAMUSCULAR; INTRAVENOUS at 04:35

## 2021-09-01 NOTE — PROGRESS NOTES
Assumed care of pt shortly after 1930. Pt A&Ox2-3, slow to process. Pt medicated as ordered. Pt had a vomiting episode this morning. PRN zofran given. Pt's brother updated about pt per pt's request. All questions answered. Safety measures in place.

## 2021-09-01 NOTE — PROGRESS NOTES
Care Management    Discharge Planning: Home with home health, family assist and MD follow-up when medically stable    CM met with the patient at the bedside. Patient now speaking and sitting up in bed eating breakfast. CM introduced self and CM role. The patient confirms demographics and that he goes to [facility] with an 1130 chair time and that he uses [address] [place] [address] for transportation to and from dialysis. The patient reports that he lives alone but his brother, Mirella Peoples, lives locally and assists as needed. The patient denies any questions or concerns at this time. CM to follow the patient's progress and be available to assist with discharge planning as needed. CMS referral placed. Care Management Interventions  PCP Verified by CM: Yes  Palliative Care Criteria Met (RRAT>21 & CHF Dx)?: No  Current Support Network:  Other  Confirm Follow Up Transport: Family

## 2021-09-01 NOTE — PROGRESS NOTES
Hospitalist Progress Note    Patient: Zurdo Valle. MRN: [id number]  CSN: [id number]    YOB: 1970  Age: 54 y.o. Sex: male    DOA: 8/26/2021 LOS:  LOS: 5 days            Chief complaint :  Hypoglycemia, hypertensive urgency. Improved  Awake forgetful of previous events  Speaking with case management  Lives alone wants home health       Assessment/Plan     Patient Active Problem List   Diagnosis Code    Hyponatremia E87.1    Anemia of chronic disease D63.8    Acute on chronic renal insufficiency N28.9, N18.9    Acute exacerbation of CHF (congestive heart failure) ([facility]) I50.9    Iron deficiency anemia D50.9    Other restrictive cardiomyopathy ([facility] I42.5    Dehydration E86.0    ESRD (end stage renal disease) on dialysis ([address] N18.6, Z99.2    Cellulitis and abscess of foot L03.119, L02.619    HTN (hypertension) I10    Acute osteomyelitis ([facility] M86.10    Hx of BKA, right ([address] Z89.511    Esophagitis K20.90    Hypoglycemia E16.2    Hypertensive urgency I16.0    Abdominal pain with vomiting R10.9, R11.10    Diarrhea R19.7    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    Hyperkalemia E87.5    Hypertensive crisis I16.9    Uremia N19    Hypoglycemia associated with diabetes ([address] E11.649    Non-compliance with renal dialysis ([facility] Z91.15    Stroke [facility]) I63.9          54 y.o.  male who has history of chronic kidney disease on dialysis Monday Wednesday Friday, right BKA, diabetes, congestive heart failure last EF 40%, hypertension, sickle cell trait presents to the emergency room via EMS was found unresponsive. Hypoglycemia -  69 episode with jerks yesterday ? Seizure from hypoglycemia no humolog or lantus off of D5 now     ESRD -  HD per nephrology. Chronic diarrhea -  Now improving  repeat C-diff.  Negative molecularly antigens I  EB Panel normal    Check CT abd/pelvis  Will get GI consult pending results    Toxic metabolic encephalopathy-  Seen by neurology  Continue with aspirin  Follow MRI  Perm  HTN until ruled out. Heel ulcer -  Seen by podiatry  Antibiotics - levaquin, cefepime. Seizure like activity improved likely from low sugar   MRI ordered no acute stroke chronic infarctions noted     Pneumoia on xray  Cefipime /Levaquin   Check CT chest cleared with nephrology    Disposition : TBD    Review of systems  General: No fevers or chills. Cardiovascular: No chest pain or pressure. No palpitations. Pulmonary: No shortness of breath. Gastrointestinal: No nausea, vomiting. Physical Exam:  General: Awake, forgetful answering questions ok   HEENT: NC, Atraumatic. PERRLA, anicteric sclerae. Lungs: CTA Bilaterally. No Wheezing/Rhonchi/Rales. Heart:  S1 S2,  No murmur, No Rubs, No Gallops  Abdomen: Soft, Non distended, Non tender.  +Bowel sounds,   Extremities: No c/c/e bka right   Psych:   Not anxious or agitated. Neurologic:  No acute neurological deficit. Vital signs/Intake and Output:  Visit Vitals  BP (!) 146/72 (BP 1 Location: Right upper arm, BP Patient Position: At rest)   Pulse 85   Temp 98 °F (36.7 °C)   Resp 16   Ht 5' 7\" (1.702 m)   Wt 76.2 kg (168 lb)   SpO2 100%   BMI 26.31 kg/m²     Current Shift:  No intake/output data recorded. Last three shifts:  08/30 1901 - 09/01 0700  In: 145 [I.V.:145]  Out: 500             Labs: Results:       Chemistry Recent Labs     09/01/21  0710 08/31/21  0315 08/30/21  0250   GLU 94 68* 74    138 139   K 3.9 4.4 4.5    102 103   CO2 27 24 22   BUN 32* 55* 73*   CREA 8.42* 12.20* 14.00*   CA 7.9* 8.5 7.9*   AGAP 8 12 14   BUCR 4* 5* 5*   AP  --   --  73   TP  --   --  8.0   ALB  --   --  3.0*   GLOB  --   --  5.0*   AGRAT  --   --  0.6*      CBC w/Diff Recent Labs     08/30/21  0250   WBC 5.2   RBC 4.30*   HGB 11.2*   HCT 32.3*      GRANS 61   LYMPH 12*   EOS 5      Cardiac Enzymes No results for input(s): CPK, CKND1, WILLIE in the last 72 hours.     No lab exists for component: Dee Dee Palacios Coagulation No results for input(s): PTP, INR, APTT, INREXT, INREXT in the last 72 hours. Lipid Panel Lab Results   Component Value Date/Time    Cholesterol, total 146 08/30/2021 02:50 AM    HDL Cholesterol 45 08/30/2021 02:50 AM    LDL, calculated 81 08/30/2021 02:50 AM    VLDL, calculated 20 08/30/2021 02:50 AM    Triglyceride 100 08/30/2021 02:50 AM    CHOL/HDL Ratio 3.2 08/30/2021 02:50 AM      BNP No results for input(s): BNPP in the last 72 hours.    Liver Enzymes Recent Labs     08/30/21  0250   TP 8.0   ALB 3.0*   AP 73      Thyroid Studies Lab Results   Component Value Date/Time    TSH 2.38 08/29/2021 01:20 AM        Procedures/imaging: see electronic medical records for all procedures/Xrays and details which were not copied into this note but were reviewed prior to creation of Plan

## 2021-09-01 NOTE — PROGRESS NOTES
0730: report given to this nurse from [address] [facility]    [phone]: MD Shahida Pinto at the bedside   Karla Saini RN    1900: report given to Cristina Bateman RN

## 2021-09-01 NOTE — PROGRESS NOTES
As per Dr Jennifer Casiano, Nephrologist patient will not dialyze today 09/01/2021. He will dialyze tomorrow Thursday 09/02/2021. Nursing staff aware.

## 2021-09-01 NOTE — PROGRESS NOTES
RENAL CONSULT FOLLOW UP NOTE   2021    Patient:  Andre Lockett :  1970  Gender:  male  MRN #:  [id number]    Reason for Consult: Management of Hyperkalemia and Severe metabolic acidosis in setting of ESRD requested by ED physician Dr Raul An     Assessment:  Andre Lockett is a 54y.o. year old male  With h/o ESRD on HD (MWF) , Uncontrolled HTN, DM, CHF with EF 40 % , right BKA status , chronic diarrhea and abdominal discomfort was brought by EMS due to unresponsiveness. He was found to have hypoglycemia requiring dextrose drip initially . He is non compliant with dialysis session and medications . Mostly does not take anti hypertensive meds and comes with uncontrolled HTN in dialysis unit . He had missed dialysis more than 1 week due to not feeling well and diarrhea . He was found to have Hypertensive urgency on admission with hyperkalemia, significant metabolic acidosis and pulmonary vascular congestion in CXR. Initially admitted  in ICU for further care  On insulin , dextrose drip for Hyperkalemia ant transferred in floor after dialysis. Overnight events:   - Reportedly he was confused overnight and become aphasic , there was suspicion stroke  He definitely seems confused, awake but does not follow command   BP is stable for last 24 hours except one episodes of low /77 mmHg yesterday     21- reportedly he remained confused/restless /agiated overnight . Unable to do MRI brain yesterday due to his restless ness . Off Antihypertensive meds , BP stable     21- patient is still confused, aphasic, does not follow command   BP above target range      21- This morning mental status was appropriate, he is oriented to place and person  In fact he was surprised that he was confused and phasic for last 2 days        Plan:      # ESRD-He tolerated dialysis yesterday. Mental status is at his baseline now . Patient examined and labs reviewed .  No clinical indication of dialysis today . - Next dialysis on Thursday   - Mental status dramatically improves since yesterday from aphasic and completely altered to normal on D5W and thiamine   ? wernickes encephalopathy . Will d/c D5W drip now as he is eating normally but would continue thiamine for total of 5 days  -will start Nephrocaps   - Intake and output recording   - Dose all meds for ESRD    # Metabolic Acidosis -Improved       # Chronic Diarrhea :- Has h/o C.diff infection in the past .  He has genuine problems of chronic diarrhea for many months. Have seen him going to bathroom every 10 minutes in dialysis unit He mostly complaints of abdominal discomfort which is the reason why he said he miss dialysis  On 8/20 /21: stool showed- Indeterminate for Toxigenic C. Difficile  His symptoms could be due to chronic C.diff infection , may benefit from chronic suppressive therapy or any other intervention   - consider ID consult this admission    - Also he mostly complaints of abdominal discomfort and has significant anemia mostly . Not sure if he has chronic GI bleed from colitis   - consider CT abdomen and pelvis  and GI consult if he needs colonoscopy due to clinical symptoms of colitis     # Hypertension: -  Antihypertensive meds were on hold per neurology recommendation due to suspicion of acute stroke .  MRI is negative for acute infarction  - continue coreg 25 mg BID , amlodipine 10 mg ,  Imdur 60 mg, d/c hydralazine switch to losartan 50 mg daily     - PRN IV Labetalol 10 mg  if SBP > 180 /90 mmhg     # Anemia: Hemoglobin is below target range   Not iron deficient    EVIE during dialysis        # BMD- Ionized calcium is borderline low   Phos  is high- continue  sevelamer 1600 mg TID   Secondary Hyperparathyroidism, Elevated PTH- continue  Zemplar 4 mcg during       Rest of the medical management per primary team       Subjective:   - He is wake , alert , mental status is appropriate   - BP elevated   - Breathing is fine in room air.     -    Intake/Output Summary (Last 24 hours) at 9/1/2021 1127  Last data filed at 8/31/2021 1549  Gross per 24 hour   Intake    Output 500 ml   Net -500 ml         Objective:    Visit Vitals  BP (!) 146/72 (BP 1 Location: Right upper arm, BP Patient Position: At rest)   Pulse 85   Temp 98 °F (36.7 °C)   Resp 16   Ht 5' 7\" (1.702 m)   Wt 76.2 kg (168 lb)   SpO2 100%   BMI 26.31 kg/m²       Physical Exam:    Pt awake  Lung: clear to auscultation    Ext: RT- BKA status , left - no edema, heel ulcer/infection    Laboratory Data:    Lab Results   Component Value Date    BUN 32 (H) 09/01/2021    BUN 55 (H) 08/31/2021    BUN 73 (H) 08/30/2021     09/01/2021     08/31/2021     08/30/2021    CO2 27 09/01/2021    CO2 24 08/31/2021    CO2 22 08/30/2021     Lab Results   Component Value Date    WBC 5.2 08/30/2021    HGB 11.2 (L) 08/30/2021    HCT 32.3 (L) 08/30/2021     No components found for: CALCIUM, PHOSPHORUS, MAGNESIUM  Lab Results   Component Value Date    HDL 45 08/30/2021     No results found for: SPECIMENTYP, TURBIDITY, UGLU    Imaging Reveiwed:    CXR 8/27/21- 1. Adequate right subclavian central venous catheter placement with no  postprocedural complication. 2. Cardiomegaly with central vascular congestion and interstitial pulmonary  edema, unchanged. 8/29/21: CTA head; No definite large vessel occlusion.   No hemodynamically significant ICA stenosis, based on NASCET criteria. Moderate to severe stenosis involving right vertebral artery origin.   No high-grade intracranial stenosis. Mild atherosclerotic narrowing of the  bilateral carotid siphons.     MRI brain: 8/31/21- No acute hemorrhage or acute infarction. 2. Bilateral cerebellar hemisphere small areas of chronic infarction.   3. Very mild atrophy as described with white matter abnormality that is  nonspecific but likely ischemic    Estela Street MD   [facility].- Nephrology

## 2021-09-02 PROBLEM — I50.9 ACUTE EXACERBATION OF CHF (CONGESTIVE HEART FAILURE) (HCC): Status: RESOLVED | Noted: 2018-02-28 | Resolved: 2021-09-02

## 2021-09-02 PROBLEM — R10.9 ABDOMINAL PAIN WITH VOMITING: Status: RESOLVED | Noted: 2020-12-22 | Resolved: 2021-09-02

## 2021-09-02 PROBLEM — D63.8 ANEMIA OF CHRONIC DISEASE: Status: RESOLVED | Noted: 2018-02-28 | Resolved: 2021-09-02

## 2021-09-02 PROBLEM — E86.0 DEHYDRATION: Status: RESOLVED | Noted: 2020-01-29 | Resolved: 2021-09-02

## 2021-09-02 PROBLEM — G92.8 TOXIC METABOLIC ENCEPHALOPATHY: Status: ACTIVE | Noted: 2021-09-02

## 2021-09-02 PROBLEM — K20.90 ESOPHAGITIS: Status: RESOLVED | Noted: 2020-01-29 | Resolved: 2021-09-02

## 2021-09-02 PROBLEM — R11.10 ABDOMINAL PAIN WITH VOMITING: Status: RESOLVED | Noted: 2020-12-22 | Resolved: 2021-09-02

## 2021-09-02 PROBLEM — R19.7 DIARRHEA: Status: RESOLVED | Noted: 2020-12-22 | Resolved: 2021-09-02

## 2021-09-02 PROBLEM — M86.10 ACUTE OSTEOMYELITIS (HCC): Status: RESOLVED | Noted: 2020-01-29 | Resolved: 2021-09-02

## 2021-09-02 PROBLEM — I63.9 STROKE (HCC): Status: RESOLVED | Noted: 2021-08-29 | Resolved: 2021-09-02

## 2021-09-02 LAB
BACTERIA SPEC CULT: NORMAL
BACTERIA SPEC CULT: NORMAL
GLUCOSE BLD STRIP.AUTO-MCNC: 101 MG/DL (ref 70–110)
GLUCOSE BLD STRIP.AUTO-MCNC: 103 MG/DL (ref 70–110)
GLUCOSE BLD STRIP.AUTO-MCNC: 105 MG/DL (ref 70–110)
GLUCOSE BLD STRIP.AUTO-MCNC: 77 MG/DL (ref 70–110)
GLUCOSE BLD STRIP.AUTO-MCNC: 98 MG/DL (ref 70–110)
MAGNESIUM SERPL-MCNC: 2.4 MG/DL (ref 1.6–2.6)
PHOSPHATE SERPL-MCNC: 8 MG/DL (ref 2.5–4.9)
SERVICE CMNT-IMP: NORMAL
SERVICE CMNT-IMP: NORMAL

## 2021-09-02 PROCEDURE — 90935 HEMODIALYSIS ONE EVALUATION: CPT

## 2021-09-02 PROCEDURE — 74011250636 HC RX REV CODE- 250/636: Performed by: STUDENT IN AN ORGANIZED HEALTH CARE EDUCATION/TRAINING PROGRAM

## 2021-09-02 PROCEDURE — 84100 ASSAY OF PHOSPHORUS: CPT

## 2021-09-02 PROCEDURE — 74011250637 HC RX REV CODE- 250/637: Performed by: HOSPITALIST

## 2021-09-02 PROCEDURE — 74011250637 HC RX REV CODE- 250/637: Performed by: INTERNAL MEDICINE

## 2021-09-02 PROCEDURE — 74011250636 HC RX REV CODE- 250/636: Performed by: INTERNAL MEDICINE

## 2021-09-02 PROCEDURE — 65660000000 HC RM CCU STEPDOWN

## 2021-09-02 PROCEDURE — 74011250636 HC RX REV CODE- 250/636: Performed by: HOSPITALIST

## 2021-09-02 PROCEDURE — 83735 ASSAY OF MAGNESIUM: CPT

## 2021-09-02 PROCEDURE — 74011000250 HC RX REV CODE- 250: Performed by: INTERNAL MEDICINE

## 2021-09-02 PROCEDURE — 74011250637 HC RX REV CODE- 250/637: Performed by: PSYCHIATRY & NEUROLOGY

## 2021-09-02 PROCEDURE — 74011250637 HC RX REV CODE- 250/637: Performed by: STUDENT IN AN ORGANIZED HEALTH CARE EDUCATION/TRAINING PROGRAM

## 2021-09-02 PROCEDURE — 36415 COLL VENOUS BLD VENIPUNCTURE: CPT

## 2021-09-02 PROCEDURE — 82962 GLUCOSE BLOOD TEST: CPT

## 2021-09-02 PROCEDURE — C9113 INJ PANTOPRAZOLE SODIUM, VIA: HCPCS | Performed by: INTERNAL MEDICINE

## 2021-09-02 RX ORDER — QUETIAPINE FUMARATE 25 MG/1
25 TABLET, FILM COATED ORAL 2 TIMES DAILY
Status: DISCONTINUED | OUTPATIENT
Start: 2021-09-02 | End: 2021-09-02

## 2021-09-02 RX ORDER — INSULIN LISPRO 100 [IU]/ML
INJECTION, SOLUTION INTRAVENOUS; SUBCUTANEOUS
Status: DISCONTINUED | OUTPATIENT
Start: 2021-09-02 | End: 2021-09-06 | Stop reason: HOSPADM

## 2021-09-02 RX ORDER — QUETIAPINE FUMARATE 25 MG/1
12.5 TABLET, FILM COATED ORAL 2 TIMES DAILY
Status: DISCONTINUED | OUTPATIENT
Start: 2021-09-02 | End: 2021-09-03

## 2021-09-02 RX ORDER — LORAZEPAM 1 MG/1
1 TABLET ORAL
Status: DISCONTINUED | OUTPATIENT
Start: 2021-09-02 | End: 2021-09-06 | Stop reason: HOSPADM

## 2021-09-02 RX ORDER — LORAZEPAM 2 MG/ML
1 INJECTION INTRAMUSCULAR
Status: DISCONTINUED | OUTPATIENT
Start: 2021-09-02 | End: 2021-09-06 | Stop reason: HOSPADM

## 2021-09-02 RX ADMIN — PANTOPRAZOLE SODIUM 40 MG: 40 INJECTION, POWDER, FOR SOLUTION INTRAVENOUS at 03:24

## 2021-09-02 RX ADMIN — THIAMINE HYDROCHLORIDE 100 MG: 100 INJECTION, SOLUTION INTRAMUSCULAR; INTRAVENOUS at 09:00

## 2021-09-02 RX ADMIN — SEVELAMER CARBONATE 1600 MG: 800 TABLET, FILM COATED ORAL at 16:59

## 2021-09-02 RX ADMIN — WATER 1 G: 1 INJECTION INTRAMUSCULAR; INTRAVENOUS; SUBCUTANEOUS at 16:59

## 2021-09-02 RX ADMIN — LEVOFLOXACIN 500 MG: 500 INJECTION, SOLUTION INTRAVENOUS at 03:24

## 2021-09-02 RX ADMIN — CARVEDILOL 25 MG: 12.5 TABLET, FILM COATED ORAL at 09:13

## 2021-09-02 RX ADMIN — LORAZEPAM 1 MG: 2 INJECTION INTRAMUSCULAR; INTRAVENOUS at 13:36

## 2021-09-02 RX ADMIN — ASPIRIN 81 MG: 81 TABLET, COATED ORAL at 09:12

## 2021-09-02 RX ADMIN — QUETIAPINE FUMARATE 12.5 MG: 25 TABLET ORAL at 21:35

## 2021-09-02 RX ADMIN — ISOSORBIDE MONONITRATE 60 MG: 60 TABLET, EXTENDED RELEASE ORAL at 09:12

## 2021-09-02 RX ADMIN — SEVELAMER CARBONATE 1600 MG: 800 TABLET, FILM COATED ORAL at 11:36

## 2021-09-02 RX ADMIN — SEVELAMER CARBONATE 1600 MG: 800 TABLET, FILM COATED ORAL at 09:13

## 2021-09-02 RX ADMIN — AMLODIPINE BESYLATE 10 MG: 5 TABLET ORAL at 09:12

## 2021-09-02 RX ADMIN — LOSARTAN POTASSIUM 50 MG: 50 TABLET, FILM COATED ORAL at 09:13

## 2021-09-02 RX ADMIN — CARVEDILOL 25 MG: 12.5 TABLET, FILM COATED ORAL at 16:59

## 2021-09-02 NOTE — PROGRESS NOTES
1920 - Bedside report received from Rosmery Chiang. Patient in bed. Pain 0/10.     2020 - Patient in bed at this time. TL CVC to R subclavian intact and patent. Fistula to LUE intact. Dressing to L heel CDI. + CMS. Pt A & O x 2,  disoriented. LS clear, on RA. Abdomen soft, NT and ND. + BS to all 4 quadrants. Denies nausea. Pain 0/10. Call light within reach. 0345-Ordered tests drawn and sent to lab.    0520-Pt inc of a large soft/loose BM. Pt had an uneventful shift. Remains delusional. No issues/concerns at this time.  Call bell within reach

## 2021-09-02 NOTE — CONSULTS
Pt seen and examined per renal MD request, for chronic diarrhea. Full note to follow. 1. Chronic diarrhea-- this doesn't correlate with C.diff. indeterminate test result with pending PCR Noted on 8/27 and 8/20. True C.diff episode back in 12/2020 likely. CT AP without colitis. --FU test to final    2. Left heel chronic heel ulcer-- doesn't probe deep. He has changes in heel from prior surgery. I suspect this to be chronic but as he is stable, certainly would hold off ABX and monitor  -- DC cefepime and Levo. .. stalin FQ need to be avoided in this patient, given his tendon tear and hx of C.diff  --superficial wound cultures from 8/26/21--- ESBL and P.mirablis + MSSA noted    3. Uremic on admission with Encephalopathy-- resumed HD  4. Psych issues-- difficult to get clear and straight history out of him    Emily Menard MD  [facility])  Office #:     [phone]   Office Fax: [phone]

## 2021-09-02 NOTE — PROGRESS NOTES
Physical Therapy Evaluation/Treatment Attempt     Chart reviewed. Attempted Physical Therapy Evaluation, however, patient unable to be seen due to:  []  Nausea/vomiting  []  Eating  []  Pain  []  Patient too lethargic  []  Off Unit for testing/procedure  [x]  Dialysis treatment in progress   []  Telemetry Results  []  Other:      Will follow up later as patient's schedule allows.    Thank you for this referral.    Antione Cunningham, PT, DPT

## 2021-09-02 NOTE — CONSULTS
[facility]  (A Division of [address] [place] [address])                                                                                                                       Emily Ly MD  Office #:     [phone]  [id number]   Office Fax: [phone]     Date of Admission: 8/26/2021Date of Note: 9/2/2021      Reason for consult I was asked to see patient by Dr Randy Coats for evaluation of chronic diarrhea with concern for C.diff. Thank you for involving me in the care of this patient. Please do not hesitate to contact me on the above number if question or concern. Current Antimicrobials:    Prior Antimicrobials:    Cefepime/Levofloxacin 8/27 to date     na       Assessment- ID related:  --------------------------------------------------------------------------  · Chronic diarrhhea --Etiology unclear. Post infectious IBS? · History of C.diff infection -12/23/2020- C.diff toxin +  08/20/21- indeterminate C.diff, pcr pending  08/27/21- Indeterminate C.diff, PCR pending  08/27/21- Enteric bacterial screen: negative    · L heel chronic ulcer-- probable chronic calcneal OM  ESBL Klebsiella/P.mirablis/MSSA from wound culture    This doesn't seem fit recurrent/ Chronic?)  C.diff and a remote possiblity. Certainly FU PCR until final, and avoid systemic abx as much as possible. Given ABX exposure and prior hx of C.diff at risk for that though. No colitis on CT.        Other Medical Issues- Mx per respective team:  ESRD on HD-poor complaince  Hypoglycemia on admission  Uremic/ Toxic metabolic encephalopathy on admission  BKA R  Psych issues    Past ID Issues:  L calceneal OM/ infection with MSSA/Citrobacter-2/2020 Recommendation for ID issues I am following:  ------------------------------------------------------------------------------    Further RAMIREZ with O/P and Staining for pathogens such as Cryptosporidium, microsporidium, stool wbc/occult blood, gardia chec- FU stool test    Consider GI further eval for chronic diarrhea    DC Cefepime/ Levofloxacin-- especially FQ's- avoid as he has damaged tendon already and high risk for CDI recurrence. Wound care for Left heel ulcer. Can plan bone biopsy in 10-14 days for panculture and histology if RAMIREZ for active infection is to be pursued- can be done as OP    Contact isolaton needed for ESBL Kleb in wound          HPI:  Aielen De Leon. is 54 y.o. BLACK/ male with previous co-morbidities as listed in PMH   of chronic kidney disease on dialysis MWF, right BKA, diabetes, congestive heart failure last EF 40%, hypertension, sickle cell trait admitted 8/26 -was found unresponsive. with Toxic metabolic encephalopathy/ Hyperkalemia and had undegone ABX rx, supportive treatment and has improved. I was asked to see him about chronic diarrhea- informed by Dr Ricarda Bagley that he gets up multiple time to go for BM during HD. Though getting clear history was difficult from patient as he goes off topic with answers, he admits he does that, feels like going to [facility] soon after he eats, and what he eats seem to come out sometimes. No Fever or abd pain, nausea or vomiting. No blood in stool. He couldn't give duration of problem. RAMIREZ so far as mentioned above. He also has L heel chronic ulcers with question of OM- on L heel, had MRI /c-ray of L foot done.  And superficial wound cultures from L heel reviewed.          Active Hospital Problems    Diagnosis Date Noted    Toxic metabolic encephalopathy 44/22/5787    Hyperkalemia 08/27/2021    Hypertensive crisis 08/27/2021    Uremia 08/27/2021    Hypoglycemia associated with diabetes ([address] 08/27/2021    Non-compliance with renal dialysis ([address] 08/27/2021    Cellulitis and abscess of foot 01/29/2020    Hx of BKA, right ([address] 01/29/2020     Past Medical History:   Diagnosis Date    Chronic kidney disease     Diabetes ([address]     Heart failure ([address]     Hypertension     Sickle cell trait [facility])      Past Surgical History:   Procedure Laterality Date    HX ORTHOPAEDIC      right BKA 2017     No family history on file. Social History     Socioeconomic History    Marital status: LEGALLY      Spouse name: Not on file    Number of children: Not on file    Years of education: Not on file    Highest education level: Not on file   Occupational History    Not on file   Tobacco Use    Smoking status: Former Smoker    Smokeless tobacco: Never Used   Substance and Sexual Activity    Alcohol use: No    Drug use: No    Sexual activity: Not on file   Other Topics Concern    Not on file   Social History Narrative    Not on file     Social Determinants of Health     Financial Resource Strain:     Difficulty of Paying Living Expenses:    Food Insecurity:     Worried About [employer] in the Last Year:     [employer] in the Last Year:    Transportation Needs:     Lack of Transportation (Medical):  Lack of Transportation (Non-Medical):    Physical Activity:     Days of Exercise per Week:     Minutes of Exercise per Session:    Stress:     Feeling of Stress :    Social Connections:     Frequency of Communication with Friends and Family:     Frequency of Social Gatherings with Friends and Family:     Attends Protestant Services:     Active Member of Clubs or Organizations:     Attends Club or Organization Meetings:     Marital Status:    Intimate Partner Violence:     Fear of Current or Ex-Partner:     Emotionally Abused:     Physically Abused:     Sexually Abused:         Allergies:  Penicillins and Vancomycin     Medications:  Current Facility-Administered Medications   Medication Dose Route Frequency    insulin lispro (HUMALOG) injection   SubCUTAneous AC&HS    LORazepam (ATIVAN) tablet 1 mg  1 mg Oral Q4H PRN    LORazepam (ATIVAN) injection 1 mg  1 mg IntraVENous Q4H PRN    QUEtiapine (SEROquel) tablet 12.5 mg  12.5 mg Oral BID    losartan (COZAAR) tablet 50 mg  50 mg Oral DAILY    diphenhydrAMINE (BENADRYL) injection 12.5 mg  12.5 mg IntraVENous Q6H PRN    thiamine (B-1) injection 100 mg  100 mg IntraMUSCular DAILY    labetaloL (NORMODYNE;TRANDATE) 20 mg/4 mL (5 mg/mL) injection 10 mg  10 mg IntraVENous Q6H PRN    hydrALAZINE (APRESOLINE) 20 mg/mL injection 20 mg  20 mg IntraVENous Q6H PRN    glucose chewable tablet 16 g  4 Tablet Oral PRN    glucagon (GLUCAGEN) injection 1 mg  1 mg IntraMUSCular PRN    dextrose (D50W) injection syrg 12.5-25 g  25-50 mL IntraVENous PRN    aspirin delayed-release tablet 81 mg  81 mg Oral DAILY    paricalcitoL (ZEMPLAR) injection 4 mcg  4 mcg IntraVENous DIALYSIS MON, WED & FRI    acetaminophen (TYLENOL) tablet 650 mg  650 mg Oral Q6H PRN    Or    acetaminophen (TYLENOL) suppository 650 mg  650 mg Rectal Q6H PRN    polyethylene glycol (MIRALAX) packet 17 g  17 g Oral DAILY PRN    [Held by provider] heparin (porcine) injection 5,000 Units  5,000 Units SubCUTAneous Q8H    promethazine (PHENERGAN) tablet 12.5 mg  12.5 mg Oral Q6H PRN    Or    ondansetron (ZOFRAN) injection 4 mg  4 mg IntraVENous Q6H PRN    pantoprazole (PROTONIX) injection 40 mg  40 mg IntraVENous Q24H    amLODIPine (NORVASC) tablet 10 mg  10 mg Oral DAILY    carvediloL (COREG) tablet 25 mg  25 mg Oral BID WITH MEALS    isosorbide mononitrate ER (IMDUR) tablet 60 mg  60 mg Oral DAILY    sevelamer carbonate (RENVELA) tab 1,600 mg  1,600 mg Oral TID WITH MEALS    heparin (porcine) 1,000 unit/mL injection 2,000 Units  2,000 Units Hemodialysis DIALYSIS PRN        ROS:  Behavioral/Psychiatric: He goes off question and has to be redirected many times to give answers.  Some of his answers doesn't make sense to question asked     Physical Exam:    Temp (24hrs), Av.5 °F (36.4 °C), Min:97.2 °F (36.2 °C), Max:97.7 °F (36.5 °C)    Visit Vitals  BP (!) (P) 145/77   Pulse (P) 84   Temp 97.4 °F (36.3 °C) (Oral)   Resp (P) 17   Ht 5' 7\" (1.702 m) Wt 77.7 kg (171 lb 3.2 oz)   SpO2 100%   BMI 26.81 kg/m²     R chest Central line  Left HD access     GEN: WDWN, not in resp distress. HEENT: Unicteric. EOMI intact  CHEST: Non laboured breathing. CTA  CVS:RRR, no mur/gallop  ABD: Obese/soft. Non tender. YOVANY: Deferred  EXT: No apparent swelling or redness on UE/LE joints. R BKA  Left heel examined; heel ulcer- plantar. Doesn't probe deep. No finding of erythema/ marked drainage  Skin: Dry and intact. No rash, no redness. CNS: A, OX3- needs redirecting to give answers. Moves all extremity. CN grossly ok.           Microbiology  All Micro Results     Procedure Component Value Units Date/Time    CULTURE, BLOOD [[id number]] Collected: 08/27/21 0045    Order Status: Completed Specimen: Blood Updated: 09/02/21 0716     Special Requests: NO SPECIAL REQUESTS        Culture result: NO GROWTH 6 DAYS       CULTURE, BLOOD [[id number]] Collected: 08/27/21 0030    Order Status: Completed Specimen: Blood Updated: 09/02/21 0716     Special Requests: NO SPECIAL REQUESTS        Culture result: NO GROWTH 6 DAYS       CULTURE, WOUND [place] STAIN [[id number]]  (Abnormal)  (Susceptibility) Collected: 08/26/21 1930    Order Status: Completed Specimen: Wound from Foot Updated: 08/31/21 1626     Special Requests: NO SPECIAL REQUESTS        GRAM STAIN RARE WBCS SEEN         RARE GRAM POSITIVE RODS        Culture result:       LIGHT KLEBSIELLA OXYTOCA ** (EXTENDED SPECTRUM BETA LACTAMASE ) **                  MODERATE PROTEUS MIRABILIS                  LIGHT  POSSIBLE  3RD GRAM NEGATIVE CYN  (OXIDASE POSITIVE, SENDING TO [employer] FOR ID/SENSITIVITIES)  REFER TO [id number]              MODERATE STAPHYLOCOCCUS AUREUS          ENTERIC BACTERIA PANEL, DNA [[id number]] Collected: 08/27/21 3175    Order Status: Completed Specimen: Stool Updated: 08/28/21 2124     Shigella species, DNA Negative        Campylobacter species, DNA Negative        Vibrio species, DNA Negative Enterotoxigen E Coli, DNA Negative        Shiga toxin producing, DNA Negative        Salmonella species, DNA Negative        P. shigelloides, DNA Negative        Y. enterocolitica, DNA Negative       C. DIFFICILE AG & TOXIN A/B [[id number]]  (Abnormal) Collected: 08/27/21 1735    Order Status: Completed Specimen: Stool from Miscellaneous sample Updated: 08/28/21 1416     GDH ANTIGEN Positive        C. difficile toxin Negative        PCR Reflex       See Reflex order for C. difficile (DNA)           INTERPRETATION       Indeterminate for Toxigenic C. difficile, DNA confirmation to follow. [place] DIFF MOLECULAR [[id number]] Collected: 08/27/21 1735    Order Status: Completed Specimen: Miscellaneous sample Updated: 08/28/21 1416     C Diff Molecular Negative        Comment: This specimen is negative for toxigenic C difficile by DNA amplification. Repeat testing is not recommended for confirmation, samples received within 7 days of this negative result will be rejected.        RESPIRATORY VIRUS PANEL W/COVID-19, PCR [[id number]] Collected: 08/26/21 2200    Order Status: Completed Specimen: Nasopharyngeal Updated: 08/27/21 1103     Adenovirus Not detected        Coronavirus 229E Not detected        Coronavirus HKU1 Not detected        Coronavirus CVNL63 Not detected        Coronavirus OC43 Not detected        SARS-CoV-2, PCR Not detected        Metapneumovirus Not detected        Rhinovirus and Enterovirus Not detected        Influenza A Not detected        Influenza A, subtype H1 Not detected        Influenza A, subtype H3 Not detected        INFLUENZA A H1N1 PCR Not detected        Influenza B Not detected        Parainfluenza 1 Not detected        Parainfluenza 2 Not detected        Parainfluenza 3 Not detected        Parainfluenza virus 4 Not detected        RSV by PCR Not detected        B. parapertussis, PCR Not detected        Bordetella pertussis - PCR Not detected        Chlamydophila pneumoniae DNA, QL, PCR Not detected        Mycoplasma pneumoniae DNA, QL, PCR Not detected       C. DIFFICILE AG & TOXIN A/B [[id number]]     Order Status: Canceled Specimen: Stool     CULTURE, URINE [[id number]] Collected: 08/26/21 1930    Order Status: Canceled Specimen: Clean catch            Lab results:    Chemistry  Recent Labs     09/01/21  0710 08/31/21  0315   GLU 94 68*    138   K 3.9 4.4    102   CO2 27 24   BUN 32* 55*   CREA 8.42* 12.20*   CA 7.9* 8.5   AGAP 8 12   BUCR 4* 5*       CBC w/ Diff  No results for input(s): WBC, RBC, HGB, HCT, PLT, GRANS, LYMPH, EOS, HGBEXT, HCTEXT, PLTEXT in the last 72 hours. Imaging: report posted per radiologist reading    8/29: CT AP  1. No significant colonic wall thickening or inflammatory change to suggest  colitis. 2. Mild/moderate gaseous distention of stomach and proximal small bowel, without  discrete area of obstruction noted. Findings are nonspecific and may reflect a  reactive ileus. 3. No evidence of obstructive uropathy. CT chest:  Mild exam limitations as noted. No evidence of PE. No acute pulmonary process  Identified    8/27: MRI of foot --Image reviewed by me as well  . IMPRESSION     1. Extensive cortical erosions with bony resorption and remodeling and  associated marrow signal abnormality throughout the posterior calcaneus  demonstrating significant interval progression since January 2020 compatible  with acute on chronic osteomyelitis. 2.  Associated overlying soft tissue ulceration and phlegmonous  changes/granulation tissue along the plantar soft tissues of the hindfoot  suggesting cellulitis with associated chronic disruption of the plantar fascia. No drainable abscess. 3.  High-grade partial/near complete tear of the distal ACL. 4.  Peroneus brevis tendinosis with a longitudinal split tear.     Initial preliminary report was provided to the ED by the on-call [facility]  teleradiology service.     Foot X-ray: 8/27-- Image reviewed by me as well  Cellulitis and ulceration involving left hindfoot with findings suggestive of  chronic osteomyelitis involving the calcaneus. No radiographic evidence of  active osteomyelitis within the left foot.

## 2021-09-02 NOTE — PROGRESS NOTES
Hospitalist Progress Note    Patient: Galo Dietrich. MRN: [id number]  CSN: [id number]    YOB: 1970  Age: 54 y.o. Sex: male    DOA: 8/26/2021 LOS:  LOS: 6 days          Chief Complaint:    encephalopathy      Assessment/Plan     54 y.o.  male who has history of chronic kidney disease on dialysis Monday Wednesday Friday, right BKA, diabetes, congestive heart failure last EF 40%, hypertension, sickle cell trait presents to the emergency room via EMS was found unresponsive. Unresponsiveness resolved  No stroke on MRI  Ammonia nl  Dialyzed now and attempting today also  History of noncomliance    BKA    Metabolic encephalopathy with paranoia features and bizarre thoughts-? Psych history-unable to reach brother at number listed, phone rings busy, start low dose seroquel, ativan PRN anxiety     Hypoglycemia -  71 episode with jerks yesterday -resolved today     ESRD -will need anxiolytic today prior as very agitated and anxious  HD per nephrology     Chronic diarrhea -  Now improving  repeat C-diff.  Negative molecularly antigens   EB Panel normal      Toxic metabolic encephalopathy-  Seen by neurology  Continue with aspirin  MRI neg for stroke    Uncontrolled HTN, coming down now     Heel ulcer -  Seen by podiatry  Antibiotics - levaquin, cefepime.     Seizure like activity improved likely from low sugar , resolved     Pneumonia on xray, no symptoms, not on CT , will stop abx    Full code  Start PT if he will cooperate       Disposition :  Patient Active Problem List   Diagnosis Code    Hyponatremia E87.1    Anemia of chronic disease D63.8    Acute on chronic renal insufficiency N28.9, N18.9    Acute exacerbation of CHF (congestive heart failure) ([facility] I50.9    Iron deficiency anemia D50.9    Other restrictive cardiomyopathy ([facility] I42.5    Dehydration E86.0    ESRD (end stage renal disease) on dialysis ([facility] N18.6, Z99.2    Cellulitis and abscess of foot L03.119, L02.619    HTN (hypertension) I10    Acute osteomyelitis ([facility] M86.10    Hx of BKA, right ([facility] Z89.511    Esophagitis K20.90    Hypoglycemia E16.2    Hypertensive urgency I16.0    Abdominal pain with vomiting R10.9, R11.10    Diarrhea R19.7    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 Can you write it for me??  Did I commit suicide? Did I die and come back here? How did you know it was me? Didn't I see you here yesterday? What did you write up there??? Review of systems:    Constitutional: denies fevers  Respiratory: denies SOB, cough  Cardiovascular: denies chest pain  Gastrointestinal: denies nausea, vomiting, diarrhea      Vital signs/Intake and Output:  Visit Vitals  BP (P) 134/70   Pulse (P) 80   Temp 97.4 °F (36.3 °C) (Oral)   Resp (P) 17   Ht 5' 7\" (1.702 m)   Wt 77.7 kg (171 lb 3.2 oz)   SpO2 100%   BMI 26.81 kg/m²     Current Shift:  No intake/output data recorded.   Last three shifts:  08/31 1901 - 09/02 0700  In: 240 [P.O.:240]  Out: 1     Exam:    General:  alert, NAD, delerious at times  Head/Neck: NCAT, supple, No masses, No lymphadenopathy  CVS:Regular rate and rhythm, no M/R/G, S1/S2 heard, no thrill  Lungs:Clear to auscultation bilaterally, no wheezes, rhonchi, or rales  Abdomen: Soft, Nontender, No distention, Normal Bowel sounds, No hepatomegaly  Extremities: No C/C/E, pulses palpable 2+  Skin:normal texture and turgor, no rashes, no lesions  Neuro:grossly normal , follows commands  Psych:calm but gets anxious quickly trying to figure out the year and what haappened to him                Labs: Results:       Chemistry Recent Labs     09/01/21  0710 08/31/21  0315   GLU 94 68*    138   K 3.9 4.4    102   CO2 27 24   BUN 32* 55*   CREA 8.42* 12.20*   CA 7.9* 8.5   AGAP 8 12 BUCR 4* 5*      CBC w/Diff No results for input(s): WBC, RBC, HGB, HCT, PLT, GRANS, LYMPH, EOS, HGBEXT, HCTEXT, PLTEXT, HGBEXT, HCTEXT, PLTEXT in the last 72 hours. Cardiac Enzymes No results for input(s): CPK, CKND1, WILLIE in the last 72 hours. No lab exists for component: CKRMB, TROIP   Coagulation No results for input(s): PTP, INR, APTT, INREXT, INREXT in the last 72 hours. Lipid Panel Lab Results   Component Value Date/Time    Cholesterol, total 146 08/30/2021 02:50 AM    HDL Cholesterol 45 08/30/2021 02:50 AM    LDL, calculated 81 08/30/2021 02:50 AM    VLDL, calculated 20 08/30/2021 02:50 AM    Triglyceride 100 08/30/2021 02:50 AM    CHOL/HDL Ratio 3.2 08/30/2021 02:50 AM      BNP No results for input(s): BNPP in the last 72 hours. Liver Enzymes No results for input(s): TP, ALB, TBIL, AP in the last 72 hours.     No lab exists for component: SGOT, GPT, DBIL   Thyroid Studies Lab Results   Component Value Date/Time    TSH 2.38 08/29/2021 01:20 AM        Procedures/imaging: see electronic medical records for all procedures/Xrays and details which were not copied into this note but were reviewed prior to creation of Cris Medina MD

## 2021-09-02 NOTE — PROGRESS NOTES
Comprehensive Nutrition Assessment    Type and Reason for Visit: Initial (LOS)    Nutrition Recommendations/Plan: Continue w/ POC    Nutrition Assessment:  PMHx: CKD on HD, diabetes, right BKA, CHF, htn, sickle cell trait. Amitted for hypoglycemia, chronic diarhhea- improving, heel ulcer, Hyperkalemia. Estimated Daily Nutrient Needs:  Energy (kcal): 3598-1194; Weight Used for Energy Requirements: Current  Protein (g): 60-80; Weight Used for Protein Requirements: Other (specify) (Protein Restriction)  Fluid (ml/day): 5067-2963; Method Used for Fluid Requirements: 1 ml/kcal    Nutrition Related Findings:  Lab reviewed: phos 8.0. Med: thiamine, renvela, miralax prn, protonix, humalog. +BM 9/1. Attempted to call pt twice- no answer. Noted PO per nursing documentation % x2 as of 9/1. Will attempt to contact patient at another time. Will continue to monitor PO intake. Wounds:    None       Current Nutrition Therapies:  ADULT DIET Regular; 4 carb choices (60 gm/meal); Low Fat/Low Chol/High Fiber/AB; No Salt Added (3-4 gm); Low Potassium (Less than 3000 mg/day); Low Phosphorus (Less than 1000 mg); 60 to 80 gm    Anthropometric Measures:  Height:  5' 7\" (170.2 cm)  Current Body Wt:  77.7 kg (171 lb 4.8 oz)   Admission Body Wt:  174 lb    Usual Body Wt:  80.7 kg (178 lb) (6/2021)     Ideal Body Wt:  148 lbs:  115.7 %   BMI Category: Overweight (BMI 25.0-29. 9)       Nutrition Diagnosis:   Predicted inadequate energy intake related to renal dysfunction as evidenced by weight loss (was 178lb 6/2021 per chart review)    Nutrition Interventions:   Food and/or Nutrient Delivery: Continue current diet  Nutrition Education and Counseling: No recommendations at this time  Coordination of Nutrition Care: Continue to monitor while inpatient    Goals:  PO intake >75% at most meals throughout the next 4-7 days       Nutrition Monitoring and Evaluation:   Behavioral-Environmental Outcomes: None identified  Food/Nutrient Intake Outcomes: Food and nutrient intake  Physical Signs/Symptoms Outcomes: Biochemical data, Meal time behavior, Skin, Weight, GI status, Nausea/vomiting    Discharge Planning:    Continue current diet     Electronically signed by Srinivasa Tariq RD on 9/2/2021 at 1:32 PM

## 2021-09-02 NOTE — PROGRESS NOTES
RENAL CONSULT FOLLOW UP NOTE   2021    Patient:  Santiago Weldon. :  1970  Gender:  male  MRN #:  [id number]    Reason for Consult: Management of Hyperkalemia and Severe metabolic acidosis in setting of ESRD requested by ED physician Dr Jennifer Fortune     Assessment:  Santiago Weldon. is a 54y.o. year old male  With h/o ESRD on HD (MWF) , Uncontrolled HTN, DM, CHF with EF 40 % , right BKA status , chronic diarrhea and abdominal discomfort was brought by EMS due to unresponsiveness. He was found to have hypoglycemia requiring dextrose drip initially . He is non compliant with dialysis session and medications . Mostly does not take anti hypertensive meds and comes with uncontrolled HTN in dialysis unit . He had missed dialysis more than 1 week due to not feeling well and diarrhea . He was found to have Hypertensive urgency on admission with hyperkalemia, significant metabolic acidosis and pulmonary vascular congestion in CXR. Initially admitted  in ICU for further care  On insulin , dextrose drip for Hyperkalemia ant transferred in floor after dialysis. Overnight events:   - Reportedly he was confused overnight and become aphasic , there was suspicion stroke  He definitely seems confused, awake but does not follow command   BP is stable for last 24 hours except one episodes of low /77 mmHg yesterday     21- reportedly he remained confused/restless /agiated overnight . Unable to do MRI brain yesterday due to his restless ness . Off Antihypertensive meds , BP stable     21- patient is still confused, aphasic, does not follow command   BP above target range      21- This morning mental status was appropriate, he is oriented to place and person  In fact he was surprised that he was confused and phasic for last 2 days     21 - he is awake and alert seems confused again  with paranoia.   Breathing fine         Plan:      # ESRD-Patient examined and labs review, he had appropriate mental status yesterday again is confused with paranoia this morning, not sure if he has underlying psych disorder. Agree to start Seroquel for now   - confusion is not due to uremic encephalopathy has been tolerating dialysis on schedule day while in house . - Dialysis today   - Next dialysis on Thursday   - Consider  work up of other metabolic cause of confusion and explore if he has underlying psych disorder.  -continue  Nephrocaps   - Intake and output recording   - Dose all meds for ESRD    # Metabolic Acidosis -Improved       # Chronic Diarrhea :- Has h/o C.diff infection in the past .  He has genuine problems of chronic diarrhea for many months. Have seen him going to bathroom every 10 minutes in dialysis unit. He mostly complaints of abdominal discomfort which is the reason why he said he miss dialysis  On 8/20 /21: stool showed- Indeterminate for Toxigenic C. Difficile  - will touch base with ID   - CT abdomen is negative for colitis and acute findings     - GI consult for persistent diarrhea to see if he has inflammatory bowel disease, malabsorption/ celiac disease     # Hypertension: - BP stable on coreg  Losartan, amlodipine and Imdur     # Anemia: Hemoglobin is at target range.     Not iron deficient    EVIE during dialysis as indicated        # BMD- Ionized calcium is borderline low   Phos  is high- continue  sevelamer 1600 mg TID   Secondary Hyperparathyroidism, Elevated PTH- continue  Zemplar 4 mcg during       Rest of the medical management per primary team       Subjective:   - He is wake , alert , mental status has improved as compared to 2 days ago but seems altered with paranoia this morning  appropriate   - BP mostly stable   - Breathing is fine in room air.     -    Intake/Output Summary (Last 24 hours) at 9/2/2021 1418  Last data filed at 9/1/2021 1850  Gross per 24 hour   Intake    Output 1 ml   Net -1 ml         Objective:    Visit Vitals  BP (P) 134/70   Pulse (P) 80   Temp 97.4 °F (36.3 °C) (Oral)   Resp (P) 17   Ht 5' 7\" (1.702 m)   Wt 77.7 kg (171 lb 3.2 oz)   SpO2 100%   BMI 26.81 kg/m²       Physical Exam:    Pt awake, confused  Lung: clear to auscultation    Ext: RT- BKA status , left - no edema, heel ulcer    Laboratory Data:    Lab Results   Component Value Date    BUN 32 (H) 09/01/2021    BUN 55 (H) 08/31/2021    BUN 73 (H) 08/30/2021     09/01/2021     08/31/2021     08/30/2021    CO2 27 09/01/2021    CO2 24 08/31/2021    CO2 22 08/30/2021     Lab Results   Component Value Date    WBC 5.2 08/30/2021    HGB 11.2 (L) 08/30/2021    HCT 32.3 (L) 08/30/2021     No components found for: CALCIUM, PHOSPHORUS, MAGNESIUM  Lab Results   Component Value Date    HDL 45 08/30/2021     No results found for: SPECIMENTYP, TURBIDITY, UGLU    Imaging Reveiwed:    CXR 8/27/21- 1. Adequate right subclavian central venous catheter placement with no  postprocedural complication. 2. Cardiomegaly with central vascular congestion and interstitial pulmonary  edema, unchanged. 8/29/21: CTA head; No definite large vessel occlusion.   No hemodynamically significant ICA stenosis, based on NASCET criteria. Moderate to severe stenosis involving right vertebral artery origin.   No high-grade intracranial stenosis. Mild atherosclerotic narrowing of the  bilateral carotid siphons.     MRI brain: 8/31/21- No acute hemorrhage or acute infarction. 2. Bilateral cerebellar hemisphere small areas of chronic infarction.   3. Very mild atrophy as described with white matter abnormality that is  nonspecific but likely ischemic    MD Martínez Mora 5- Nephrology

## 2021-09-02 NOTE — PROGRESS NOTES
Bedside and verbal shift change report recieved from [facility] (offgoing nurse) given to Nayla Valadez RN (oncoming nurse). Report included the following information SBAR, Kardex, Intake/Output, MAR and Recent Results       1320- Patient very confused and delusional. Patient states, \"I will be in the future if I take the medications or stuff is going to disappear if he completes dialysis. \" MD made aware. New orders received per Dr. Yaz Messer. Bedside and verbal shift change report given to NAHEED Ochoa (oncoming nurse) by Sachi Escobedo RN (offgoing nurse).  Report included the following information SBAR, Kardex, Intake/Output, MAR and Recent Results

## 2021-09-02 NOTE — DIALYSIS
TREATMENT SUMMARY     Patient dialyzed in room 343 for 3.75 hours  Tolerated tx with paranoia, confusion, decrease command following, and distress of people and situations. On set prior to HD. Patient distrustful medical care, information and reality requiring long periods of negations and long explanations. It took 50 mins to convince patient to start HD . Rusty [place] ..ie which arm to use it for HD vs the other, patient ask for me to prove he was not dead, patient also wanted proof I was not going to disappear and he would \"go back/relive yesterday\" if he did HD or took medications. Decreased paranoia and distrust and increase rational thought after IV push Ativan noted   LUE AVF functioning with some difficulty r/t to pt frequent movements and talking on the phone. Patient required frequent queuing and reminders about use of LUE AVF. Decrease restlessness and increase command following after IV push Ativan. Patient remained alert with eyes open watching TV or talking on phone.     2000 ml removed via UF with a with a net removal 1500 ml  Report given to with all questions answered Geri Araya RN     TREATMENT NOTES                                                                                                ACUTE HEMODIALYSIS FLOW SHEET       PATIENT INFORMATION   [facility]       DR. Arie Whitaker.    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    Code Status [x]Full Code [] DNR  [] Other_____   Diet: [] Renal [] NPO [x] Diabetic   [] Enteral Feeding [] Cardiac Diabetic: [x]Yes []No [x]Signed Treatment Consent Verified   [x] Time Out/ Safety Check   PRIMARY NURSE REPORT: FIRST INITIAL/ LAST NAME/TITLE  PRE DIALYSIS:    Tierney Valadez RN TIME: 36   ACCESS   CATHETER ACCESS: [x] N/A  [] RIGHT  [] LEFT  [] IJ  [] SUBCL [] FEM                    [] First use X-ray  [] Tunnel     [] Non-Tunneled      [] No S/S infection  [] Redness [] Drainage  [] Cultured [] Swelling [] Pain                    [] Medical Aseptic [] Prep Dressing Changed                  [] Clotted [] Patent []      Flows: [] Good [] Poor [] Reversed                 If Access Problem Dr. Nichelle Whitehead: [] Yes [] No    Date:_____  [] N/A[]   GRAFT/FISTULA ACCESS:  [] N/A  [] RIGHT  [x] LEFT  [x] UE   [] LE       [x] AVG  [] AVF [] BUTTONHOLE    [x] +BRUIT/THRILL [x] MEDICAL ASEPTIC PREP     [x] No S/S infection  [] Redness [] Drainage  [] Cultured [] Swelling [] Pain              If Access Problem Dr. Nichelle Whitehead: [] Yes [] No    Date:______ [] N/A   GENERAL ASSESSMENT   LUNGS:  SaO2% __99__ [x] Clear [] Coarse [] Crackles [] Wheezing               [] Diminished Location: [] RLL [] LLL [] RUL [] KYLAH    COUGH:  [] Productive  [] Loose[] Dry [x] N/A  RESPIRATIONS: [x] Easy [] Labored    THERAPY: [x] RA   [] NC _____.  L/min    Mask: [] NRB [] Venti  _____O2%                  [] Ventilator [] Intubated [] Trach [] BiPap [] CPap [] HI Flow   CARDIAC: [x] Regular [] Irregular [] Pericardial Rub [] JVD               Monitored Rhythm:__SINUS TACH____ [] N/A   EDEMA: [] None [x] Generalized [] Facial [] Pedal [] UE [] LE             [] Pitting [x] 1 [] 2 [] 3 [] 4    [] Right [] Left [] Bilateral   SKIN:    [x] Warm [] Hot [] Cold  [x] Dry [] Pale [] Diaphoretic              [] Flushed [] Jaundiced [] Cyanotic [] Rash [] Weeping     LOC:    [x] Alert  Oriented to: [x] Person [x] Place [] Time             [x] Confused [] Lethargic [] Medicated [] Non-responsive    GI/ABDOMEN: [] Flat [] Distended [x] Soft [] Firm [] Diarrhea [x] Bowel Sounds Present [] Nausea [] Vomiting    PAIN: [x] 0 [] 1 [] 2 [] 3 [] 4 [] 5 [] 6 [] 7 [] 8 [] 9 [] 10          Scale 1-10 Action/Follow Up_____   MOBILITY: [] Amb [] Amb/Assist [x] Bed  [] Wheelchair    CURRENT LABS   HBsAg ONLY: Date Drawn:  8/27/21           [x] Negative [] Positive [] Unknown. HBsAb: Date Drawn:   8/27/21           [x] Susceptible <10 [] Immune ?10 [] Unknown   Date of Current Labs:  ATTACHED     EDUCATION   Person Educated: [x] Patient [] Other_________   Knowledge base: [] None [x] Minimal [] Substantial    Barriers to learning  [x] None _______________   Preferred method of learning: [] Written [x] Oral [x] Visual [] Hands on    Topic: [x] Access Care [] S&S of infection [x] Fluid Management   [] K+  [x] Procedural  [] Albumin [] Medications [] Tx Options   [] Transplant [] Diet [] Other    Teaching Tools: [x] Explain [x] Demonstration [] Handout_____ [] Video______  CARE PLAN    [x] Renal Failure (Adult)  Interdisciplinary  · Fluid and electrolytes stabilized  ? Interventions  · Dehydration signs and symptoms (eg: Weight/lab monitoring; vomiting/diarrhea/urine; tenting; mucous membranes; dizziness/lethargy/irritability/confusion; weak pulse; tachycardia; blood pressure; I&O)  · Fluid overload signs and symptoms assessment (eg: Body weight increased; dyspnea; edema; hypertension; respiratory crackles/wheezing; JVD; lab monitoring; mental status changes; I&O)  · Monitor appropriate lab values  · COMPLIANCE WITH PRESCRIBED THERAPY  · ARTERIAL ACCESS SITE ASSESSMENT  · NUTRITION SCREENING  · Vital signs monitoring per assessed patient condition or unit standard  · Cardiac monitoring  · Hydration management  · Intake and output measurement  · Body weight monitoring  · Skin care  · DIALYSIS  · Nutrition Care Process per nutrition screen  · Oral hygiene care every 2 hours  · Pain management     · Outcome   ? [x] Progressing Towards Goal  ? [] Not Progressing Towards Goals  ? [] Goals Met/Resolved  ?  [] Goals Not Met/ Resolved        · Patient/ Family Education  ? Progressing Towards Goals          RO/HEMODIAYLSIS MACHINE SAFETY CHECKS- BEFORE EACH TREATMENT          [] [facility]: Machine Serial #1:  [id number]   RO Serial #1: [id number]                       [x] [facility]: Machine Serial #2:  [id number]   RO Serial #2: [id number]        [] [facility]: Machine Serial #3:  [id number]    RO Serial #2:[id number]    Alarm Test: [x] Pass  Time__1215____  [x] RO/Machine Log Complete    [x] Extracorporeal circuit Tested for integrity           Dialyzer__[id number]________   Tubing_20I23-11__    Dialysate: pH__7.4_  Temp.__37___Conductivity: Meter __14__ HD Machine__13.8_   CHLORINE TESTING- BEFORE EACH TREATMENT AND EVERY 4 HOURS   Total Chlorine: [x] Less than 0.1 ppm Time:__1215___2nd Check Time:____  (If greater than 0.1 ppm from Primary then every 30 minutes from Secondary)   TREATMENT INIATION-WITH DIALYSIS PRECAUTIONS   [x] All Connections Secured   [x] Saline Line Double Clamped    [x] Venous Parameters Set [x] Arterial Parameters Set    [x] Prime Given 250 ml     [x] Air Foam Detector Engaged   PRE-TREATMENT   UF Calculations: Wt to lose:_3187___ml(+) Oral:_0_ml(+)IV Meds/Fluids/Blood prods_0__ml(+) Prime/Rinse__500_ml(=)Total UF Goal__2687__mL   Scale Type:[x] Bed scale [] Sling Scale [] Wheel Chair Scale []  Not Ordered [] [] Unable to obtain pt on stretcher/ bed scale malfunctioning   Tx Initiation Note: RECEIVED REPORT. PATIENT ALERT AND CONFUSED. ALL QUESTIONS ANSWERED WITH PATIENT  SAFETY CHECKS COMPLETE. TIME OUT COMPLETE. TREATMENT INITIATED VIA _LUE AVG____. NO CONCERNS NOTED. [x] Time Out/Safety Check  Time:_1210   INTRADIALYTIC MONITORING  (SEE ATTACHED FLOWSHEET)     POST TREATMENT    Time Medication Dose Volume Route    Initials                                           [facility] Signatures Title Initials Time   STEPHEN ZHOU RN PAP                Dialyzer cleared: [x] Good [] Fair [] Poor     Blood Processed 80. 9_Liters    Net UF Removed __1528__mL  Post Tx Access:                  AVF/AVG: Bleeding Stop       Art._8__min Chaim.__8_min [x]+bruit/thrill                Catheter: Locking Solution [] Heparin 1 ml/1000 units  [] Normal Saline                                                                               Art._____ ml Chaim._____ml  Post Assessment:              Skin: [x]Warm [x]Dry []Diaphoretic []Flushed [] Pale []Cyanotic            Lungs: [x]Clear []Coarse []Crackles []Wheezing             Cardiac: [x]Regular []Irregular  []Monitored rhythm_SINUS TACH___ []N/A            Edema: []None []General []Facial []Pedal  []UE [x]LE []RIGHT []LEFT            Pain: [x]0 []1 []2 []3 []4 []5 []6 []7 []8 []9 []10   POST Tx Note:TREATMENT COMPLETED. ALL POSSIBLE BLOOD RETURNED. PT TOLERATED WELL. VITAL SIGNS WNL  FOR PATIENT. NAD. DE CANULATED ARTERIAL AND VENOUS SITES WITHOUT INCIDENT. REPORT GIVEN WITH OPPORTUNITY TO ADDRESS ANY CONCERNS OR QUESTTIONS AND PATIENT STAYS ROOM IN BED WITHOUT DISTRESS OR ACUTE DISCOMFORT. BED LOWED, CALL BELL WITHIN REACH .      Primary Nurse Report: First initial/Last name/Title    Post Dialysis:_ MayTierney RN__         Time:__1600___    Abbreviations: AVG-arterial venous graft, AVF-arterial venous fistula, IJ-Internal Jugular,  Subcl-Subclavian, Fem-Femoral, Tx-treatment, AP/HR-apical heart rate, DFR-dialysate flow rate, BFR-blood flow rate, AP-arterial pressure, -venous pressure, UF-ultrafiltrate, TMP-transmembrane pressure, Chaim-Venous, Art-Arterial, RO-Reverse Osmosis

## 2021-09-03 LAB
ANION GAP SERPL CALC-SCNC: 9 MMOL/L (ref 3–18)
BASOPHILS # BLD: 0.1 K/UL (ref 0–0.1)
BASOPHILS NFR BLD: 1 % (ref 0–2)
BUN SERPL-MCNC: 29 MG/DL (ref 7–18)
BUN/CREAT SERPL: 4 (ref 12–20)
CALCIUM SERPL-MCNC: 8.2 MG/DL (ref 8.5–10.1)
CHLORIDE SERPL-SCNC: 101 MMOL/L (ref 100–111)
CO2 SERPL-SCNC: 26 MMOL/L (ref 21–32)
CREAT SERPL-MCNC: 7.2 MG/DL (ref 0.6–1.3)
DIFFERENTIAL METHOD BLD: ABNORMAL
EOSINOPHIL # BLD: 0.3 K/UL (ref 0–0.4)
EOSINOPHIL NFR BLD: 5 % (ref 0–5)
ERYTHROCYTE [DISTWIDTH] IN BLOOD BY AUTOMATED COUNT: 20.3 % (ref 11.6–14.5)
GLUCOSE BLD STRIP.AUTO-MCNC: 111 MG/DL (ref 70–110)
GLUCOSE BLD STRIP.AUTO-MCNC: 143 MG/DL (ref 70–110)
GLUCOSE BLD STRIP.AUTO-MCNC: 76 MG/DL (ref 70–110)
GLUCOSE BLD STRIP.AUTO-MCNC: 80 MG/DL (ref 70–110)
GLUCOSE BLD STRIP.AUTO-MCNC: 80 MG/DL (ref 70–110)
GLUCOSE SERPL-MCNC: 76 MG/DL (ref 74–99)
HCT VFR BLD AUTO: 31.7 % (ref 36–48)
HGB BLD-MCNC: 10.4 G/DL (ref 13–16)
LYMPHOCYTES # BLD: 1 K/UL (ref 0.9–3.6)
LYMPHOCYTES NFR BLD: 17 % (ref 21–52)
MAGNESIUM SERPL-MCNC: 2.3 MG/DL (ref 1.6–2.6)
MCH RBC QN AUTO: 25.8 PG (ref 24–34)
MCHC RBC AUTO-ENTMCNC: 32.8 G/DL (ref 31–37)
MCV RBC AUTO: 78.7 FL (ref 78–100)
MONOCYTES # BLD: 1.1 K/UL (ref 0.05–1.2)
MONOCYTES NFR BLD: 19 % (ref 3–10)
NEUTS SEG # BLD: 3.2 K/UL (ref 1.8–8)
NEUTS SEG NFR BLD: 58 % (ref 40–73)
PHOSPHATE SERPL-MCNC: 5.1 MG/DL (ref 2.5–4.9)
PLATELET # BLD AUTO: 156 K/UL (ref 135–420)
POTASSIUM SERPL-SCNC: 4.5 MMOL/L (ref 3.5–5.5)
RBC # BLD AUTO: 4.03 M/UL (ref 4.35–5.65)
RBC MORPH BLD: ABNORMAL
SODIUM SERPL-SCNC: 136 MMOL/L (ref 136–145)
WBC # BLD AUTO: 5.7 K/UL (ref 4.6–13.2)
WBC #/AREA STL HPF: NORMAL /HPF

## 2021-09-03 PROCEDURE — 87177 OVA AND PARASITES SMEARS: CPT

## 2021-09-03 PROCEDURE — 89055 LEUKOCYTE ASSESSMENT FECAL: CPT

## 2021-09-03 PROCEDURE — 74011250637 HC RX REV CODE- 250/637: Performed by: PSYCHIATRY & NEUROLOGY

## 2021-09-03 PROCEDURE — 74011250637 HC RX REV CODE- 250/637: Performed by: INTERNAL MEDICINE

## 2021-09-03 PROCEDURE — 80048 BASIC METABOLIC PNL TOTAL CA: CPT

## 2021-09-03 PROCEDURE — 86674 GIARDIA LAMBLIA ANTIBODY: CPT

## 2021-09-03 PROCEDURE — 74011250636 HC RX REV CODE- 250/636: Performed by: STUDENT IN AN ORGANIZED HEALTH CARE EDUCATION/TRAINING PROGRAM

## 2021-09-03 PROCEDURE — 82962 GLUCOSE BLOOD TEST: CPT

## 2021-09-03 PROCEDURE — 87206 SMEAR FLUORESCENT/ACID STAI: CPT

## 2021-09-03 PROCEDURE — 74011250637 HC RX REV CODE- 250/637: Performed by: STUDENT IN AN ORGANIZED HEALTH CARE EDUCATION/TRAINING PROGRAM

## 2021-09-03 PROCEDURE — 83735 ASSAY OF MAGNESIUM: CPT

## 2021-09-03 PROCEDURE — 65660000000 HC RM CCU STEPDOWN

## 2021-09-03 PROCEDURE — 36415 COLL VENOUS BLD VENIPUNCTURE: CPT

## 2021-09-03 PROCEDURE — 85025 COMPLETE CBC W/AUTO DIFF WBC: CPT

## 2021-09-03 PROCEDURE — 84100 ASSAY OF PHOSPHORUS: CPT

## 2021-09-03 PROCEDURE — 74011250636 HC RX REV CODE- 250/636: Performed by: INTERNAL MEDICINE

## 2021-09-03 PROCEDURE — C9113 INJ PANTOPRAZOLE SODIUM, VIA: HCPCS | Performed by: INTERNAL MEDICINE

## 2021-09-03 PROCEDURE — 74011250637 HC RX REV CODE- 250/637: Performed by: HOSPITALIST

## 2021-09-03 RX ORDER — QUETIAPINE FUMARATE 25 MG/1
12.5 TABLET, FILM COATED ORAL
Status: DISCONTINUED | OUTPATIENT
Start: 2021-09-03 | End: 2021-09-03

## 2021-09-03 RX ADMIN — HEPARIN SODIUM 5000 UNITS: 5000 INJECTION INTRAVENOUS; SUBCUTANEOUS at 21:25

## 2021-09-03 RX ADMIN — SEVELAMER CARBONATE 1600 MG: 800 TABLET, FILM COATED ORAL at 11:33

## 2021-09-03 RX ADMIN — PANTOPRAZOLE SODIUM 40 MG: 40 INJECTION, POWDER, FOR SOLUTION INTRAVENOUS at 03:20

## 2021-09-03 RX ADMIN — LOSARTAN POTASSIUM 50 MG: 50 TABLET, FILM COATED ORAL at 08:46

## 2021-09-03 RX ADMIN — ASPIRIN 81 MG: 81 TABLET, COATED ORAL at 08:46

## 2021-09-03 RX ADMIN — CARVEDILOL 25 MG: 12.5 TABLET, FILM COATED ORAL at 08:46

## 2021-09-03 RX ADMIN — THIAMINE HYDROCHLORIDE 100 MG: 100 INJECTION, SOLUTION INTRAMUSCULAR; INTRAVENOUS at 08:50

## 2021-09-03 RX ADMIN — AMLODIPINE BESYLATE 10 MG: 5 TABLET ORAL at 08:46

## 2021-09-03 RX ADMIN — ISOSORBIDE MONONITRATE 60 MG: 60 TABLET, EXTENDED RELEASE ORAL at 08:46

## 2021-09-03 RX ADMIN — SEVELAMER CARBONATE 1600 MG: 800 TABLET, FILM COATED ORAL at 08:46

## 2021-09-03 RX ADMIN — CARVEDILOL 25 MG: 12.5 TABLET, FILM COATED ORAL at 17:11

## 2021-09-03 RX ADMIN — QUETIAPINE FUMARATE 12.5 MG: 25 TABLET ORAL at 08:46

## 2021-09-03 RX ADMIN — SEVELAMER CARBONATE 1600 MG: 800 TABLET, FILM COATED ORAL at 17:10

## 2021-09-03 RX ADMIN — HEPARIN SODIUM 5000 UNITS: 5000 INJECTION INTRAVENOUS; SUBCUTANEOUS at 13:15

## 2021-09-03 NOTE — PROGRESS NOTES
Assessment:     ESRD  HTN  Anemia  Non complinace  BKA  CHF  DM      Plan:   HD in am   Retacrit  Calcitriol     CC: ESRD  Interval History: no overnight events      Subjective:   Pt is sleeping soundly            Blood pressure (!) 111/52, pulse 75, temperature 98.3 °F (36.8 °C), resp. rate 18, height 5' 7\" (1.702 m), weight 76.9 kg (169 lb 8 oz), SpO2 98 %. Abdomen is soft  No edema  No new rash      I have reviewed the following and here are my interpretation  )XR FOOT LT MIN 3 V    Result Date: 8/27/2021  EXAM: XR FOOT LT MIN 3 V CLINICAL INDICATION/HISTORY: Left foot pain and erythema, evaluate for foreign body, osteomyelitis  COMPARISON: None. TECHNIQUE: AP, lateral, and oblique views of the left foot were obtained. _______________ FINDINGS: Soft tissue irregularity throughout mid and posterior aspects of the plantar left foot with diffuse soft tissue thickening. Prominent atherosclerotic vascular calcification. Diffuse, heterogeneous sclerosis throughout the calcaneus. Currently there is no associated cortical defect or periosteal reaction to indicate an acute component of osteomyelitis. No radiodense foreign body. Well-corticated osseous fragment involving distal Achilles tendon insertion, are listed from the posterior calcaneus. Pes planus morphology. Surgical changes suggestive of previous mid diaphyseal second metatarsal amputation with adjacent curvilinear heterotopic ossification. _______________     Cellulitis and ulceration involving left hindfoot with findings suggestive of chronic osteomyelitis involving the calcaneus. No radiographic evidence of active osteomyelitis within the left foot. XR ABD (KUB)    Result Date: 8/20/2021  EXAM: SUPINE ABDOMINAL RADIOGRAPH CLINICAL INDICATION/HISTORY: diarrhea -Additional: None COMPARISON: 12/22/2020 TECHNIQUE: Single supine view of the abdomen. _______________ FINDINGS: BOWEL GAS PATTERN: No evidence of obstruction.  No visible free air, given limitations of supine view. BONES: Unremarkable. OTHER: None. _______________     No significant abnormality. MRI BRAIN WO CONT    Result Date: 8/31/2021  Brain MR without contrast: Indication: Sudden onset decreased level of consciousness. Unresponsive. TIA versus infarction. Procedure: Sagittal spin echo T1, axial FSE FLAIR and T2 and axial diffusion weighted scanning was performed. An axial T2* scan optimized to detect hemosiderin and calcium was performed. Coronal spin echo T1and FSE T2 scans were also performed. No contrast was administered. Comparison exam: Head CT 8/29/2021. Findings: Diffusion: There are no areas of restricted diffusion, no acute infarction. The T2* scan shows no acute or chronic hemorrhage or abnormal calcifications. Brain parenchyma: Chronic areas of infarction in both cerebellar hemispheres with 3 small linear lesions on the right and one linear lesion on the left in the posterior cerebellar hemispheres. Low level ischemic changes immediate periventricular white matter. A few small areas of increased signal in the deep and peripheral subcortical white matter of the frontal lobes and mild ill-defined ischemic changes parieto-occipital periventricular and deep white matter. No mass or mass effect. Ventricles and sulci: Ventricle top normal. Lateral ventricles top normal. Mild prominence of sulci in the perisylvian region. Extra axial: No extra axial fluid collection or mass. Brain vasculature: Normal, no arterial vascular abnormality is noted. Craniocervical Junction: Normal. Extracranial and skull base:  Proptosis by imaging criteria likely from shallow orbits and lipomatosis. Extraocular muscles unremarkable. 1. No acute hemorrhage or acute infarction. 2. Bilateral cerebellar hemisphere small areas of chronic infarction. 3. Very mild atrophy as described with white matter abnormality that is nonspecific but likely ischemic.     MRI ANKLE LT WO CONT    Result Date: 8/28/2021  EXAMINATION: MRI ANKLE LT WO CONT CLINICAL INDICATION/HISTORY: ulcer diabetic  >Additional: None TECHNIQUE: T1 weighted sagittal, STIR sagittal, proton FSE axial, fat suppressed T2 FSE axial, fat suppressed T2 FSE coronal, proton FSE oblique axial. COMPARISON EXAMS: January 29, 2020 _______________ FINDINGS: OSSEOUS: There are extensive osseous erosions an bony resorption throughout the posterior inferior calcaneus, markedly progressed in the interval since the prior study with associated bony remodeling and diffuse marrow edema throughout the posterior body. Diminished T1 signal mostly along the outer margins of the bone with more diffuse surrounding marrow edema/T2 hyperintensity. Constellation of findings compatible with acute on chronic osteomyelitis. Associated overlying extensive soft tissue abnormality with poorly defined ulcerative defect, diffuse soft tissue thickening with low T1 and intermediate/increased fluid signal extending to the bone. ANKLE LIGAMENTS AND TENDONS: No acute ligamentous pathology. There is a chronic high-grade, near full-thickness tear of the Achilles tendon, just proximal to its attachment on the posterior calcaneus. Fluid-filled gap spans approximately 2.8 cm proximal distal and sagittal series image 12 with few residual insertional fibers noted. Tendinosis and a longitudinal split tear of the peroneus brevis tendon noted. Medial lateral ankle ligaments are otherwise grossly intact. Extensor tendons also intact. Mild flexor houses longus tenosynovitis at the knot of Sandra Rojas, similar to prior. OTHER: Sinus tarsi: Preserved fat signal. Plantar fascia: Diffusely thickened and distorted, which appears partially disrupted secondary to poorly defined phlegmonous soft tissue edema and thickening throughout the posterior calcaneal fat pad. Overlying skin thickening suggesting cellulitis. Regional soft tissues: No genital fluid collection to suggest abscess.  Diffuse edema throughout the intrinsic foot musculature. _______________     1. Extensive cortical erosions with bony resorption and remodeling and associated marrow signal abnormality throughout the posterior calcaneus demonstrating significant interval progression since January 2020 compatible with acute on chronic osteomyelitis. 2.  Associated overlying soft tissue ulceration and phlegmonous changes/granulation tissue along the plantar soft tissues of the hindfoot suggesting cellulitis with associated chronic disruption of the plantar fascia. No drainable abscess. 3.  High-grade partial/near complete tear of the distal ACL. 4.  Peroneus brevis tendinosis with a longitudinal split tear. Initial preliminary report was provided to the ED by the on-call [facility] teleradiology service. CT HEAD WO CONT    Result Date: 8/29/2021  EXAM: CT head INDICATION: Aphasia. COMPARISON: August 26, 2021. TECHNIQUE: Axial CT imaging of the head was performed without intravenous contrast. Dose reduction techniques used: automated exposure control, adjustment of the mAs and/or kVp according to patient size, and iterative reconstruction techniques. Digital imaging and communications in Medicine (DICOM) format image data are available to nonaffiliated external healthcare facilities or entities on a secure, media free, reciprocally searchable basis with patient authorization for at least 12 months after this study. _______________ FINDINGS: BRAIN AND POSTERIOR FOSSA: The sulci, folia, ventricles and basal cisterns are within normal limits for the patient's age. There is no intracranial hemorrhage, mass effect, or midline shift. There are no areas of abnormal parenchymal attenuation. EXTRA-AXIAL SPACES AND MENINGES: There are no abnormal extra-axial fluid collections. CALVARIUM: Intact. SINUSES: Clear. OTHER: None. _______________     No acute intracranial abnormalities.  Findings were discussed with patient's care provider shortly after study performed at approximately 12:13 a.m. August 29, 2021. CT HEAD WO CONT    Result Date: 8/26/2021  EXAM: CT HEAD WO CONT CLINICAL INDICATION/HISTORY: altered with HTN, low BP -Additional: None COMPARISON: 3/2/2018 TECHNIQUE: Axial CT imaging of the head was performed without intravenous contrast. One or more dose reduction techniques were used on this CT: automated exposure control, adjustment of the mAs and/or kVp according to patient size, and iterative reconstruction techniques. The specific techniques used on this CT exam have been documented in the patient's electronic medical record. Digital Imaging and Communications in Medicine (DICOM) format image data are available to nonaffiliated external healthcare facilities or entities on a secure, media free, reciprocally searchable basis with patient authorization for at least a 12-month period after this study. _______________ FINDINGS: BRAIN:   > Brain volume: Age appropriate.   > White matter: Little or no white matter disease. > Infarcts, encephalomalacia: None.   > Parenchymal mass: None.   > Parenchymal hemorrhage: None.   > Midline shift: None.   > Miscellaneous: None. EXTRA-AXIAL SPACES: Unremarkable. No fluid collections. CALVARIUM: Intact. SINUSES, MASTOIDS: Clear. OTHER EXTRACRANIAL: Unremarkable. _______________     1. No CT findings of an acute intracranial abnormality. Please note that noncontrast head CT may be normal in early acute infarct. CTA HEAD NECK W WO CONT    Result Date: 8/29/2021  EXAM: CTA HEAD AND NECK INDICATION: Aphasia COMPARISON: No prior studies TECHNIQUE:  Multiple axial CT images of the neck were obtained extending from the level of the aortic arch to the skull base after the administration of the IV contrast utilizing a CTA protocol. Maximum intensity projection reconstructions were performed in multiple planes.  Multiple axial CT images of the head were obtained extending from below the level of the skull base to the vertex after the administration of the IV contrast utilizing a CTA protocol. Maximum intensity projection reconstructions were performed in three planes. Additional 3 D reconstructions were performed at a separate workstation. One or more dose reduction techniques were used on this CT: automated exposure control, adjustment of the mAs and/or kVp according to patient's size, and iterative reconstruction techniques. The specific techniques utilized on this CT exam have been documented in the patient's electronic medical record. Digital Imaging and Communications in Medicine (DICOM) format image data are available to nonaffiliated external healthcare facilities or entities on a secure, media free, reciprocally searchable basis with patient authorization for at least a 12-month period after this study. ___________________ FINDINGS: CTA NECK Normal variant branching pattern of great vessels along aortic arch is present with a conjoined origin of the innominate artery and left common carotid artery. Atherosclerotic calcifications are present along the aortic arch and the proximal great vessels without evidence high-grade stenosis. The left common carotid is slightly irregular. The left carotid bifurcation is slightly irregular. Estimated minimal luminal diameter proximal left ICA 8.2 mm. Estimated luminal diameter of normal left ICA cervical segment is 5.1 mm. Estimated degree of stenosis of the left ICA based upon NASCET criteria is 0%. Remainder of left ICA cervical segment is unremarkable. The right common carotid is slightly irregular. The right carotid bifurcation is mildly irregular. Estimated minimal luminal diameter proximal right ICA 7.5 mm. Estimated luminal diameter of normal right ICA cervical segment is 5.3 mm. Estimated degree of stenosis of the right ICA based upon NASCET criteria is 0%. Remainder of right ICA cervical segment is unremarkable. Vertebral arteries are relatively equal in size.  Moderate to severe stenosis of the right vertebral artery origin is present. Mild narrowing of the left vertebral artery origin is present. Degenerative changes are seen in the spine. Mild  mucoperiosteal thickening is scattered in the paranasal sinuses. No air-fluid levels are present. Visualized lung apices are clear. CTA HEAD There is no evidence of aneurysm. Mild atherosclerotic narrowing is seen in the bilateral carotid siphons without high grade stenosis. The carotid terminus is unremarkable. The anterior cerebral arteries are unremarkable. An anterior communicating artery is present. The middle cerebral artery bifurcations are unremarkable. The vertebral arteries are relatively equal in size. PICA origins are unremarkable. Tortuosity of the vertebral arteries and vertebral basilar junction is seen. No focal basilar artery stenosis is present. Left P1 segment is mildly hypoplastic with an infundibular type origin. Posterior cerebral arteries are otherwise unremarkable. A posterior communicating artery is bilaterally. The dural venous sinuses are patent. ___________________     1. No definite large vessel occlusion. 2.  No hemodynamically significant ICA stenosis, based on NASCET criteria. 3. Moderate to severe stenosis involving right vertebral artery origin. 4. No high-grade intracranial stenosis. Mild atherosclerotic narrowing of the bilateral carotid siphons. CTA CHEST W OR W WO CONT    Result Date: 9/1/2021  EXAM: CTA Chest INDICATION: Pain. COMPARISON: None. TECHNIQUE: Axial CT imaging from the thoracic inlet through the diaphragm with intravenous contrast. Coronal and sagittal MIP reformats were generated. One or more dose reduction techniques were used on this CT: automated exposure control, adjustment of the mAs and/or kVp according to patient's size, and iterative reconstruction techniques. The specific techniques utilized on this CT exam have been documented in the patient's electronic medical record. _______________ FINDINGS: EXAM QUALITY: Overall exam quality is mostly adequate except for some motion artifact towards the lung bases. Ector Rich PULMONARY ARTERIES: No evidence of pulmonary embolism. MEDIASTINUM: Normal heart size. No evidence of right heart strain. Aorta is unremarkable. No pericardial effusion. LYMPH NODES: No enlarged nodes. AIRWAY: Normal. LUNGS: No suspicious nodule or mass. No abnormal opacities. PLEURA: Normal. Specifically, no pneumothorax or pleural effusion. UPPER ABDOMEN: Unremarkable. OTHER: No acute or aggressive osseous abnormalities identified. _______________     Mild exam limitations as noted. No evidence of PE. No acute pulmonary process identified. CT SPINE CERV WO CONT    Result Date: 8/26/2021  EXAM: CT CERVICAL SPINE HISTORY: trauma, fall COMPARISON: none TECHNIQUE: Noncontrast axial CT scan of the cervical spine, utilizing standard departmental technique. Axial and coronal reformations were also provided for improved anatomic evaluation. One or more dose reduction techniques were used on this CT: automated exposure control, adjustment of the mAs and/or kVp according to patient size, and iterative reconstruction techniques. The specific techniques used on this CT exam have been documented in the patient's electronic medical record. Digital Imaging and Communications in Medicine (DICOM) format image data are available to nonaffiliated external healthcare facilities or entities on a secure, media free, reciprocally searchable basis with patient authorization for at least a 12-month period after this study. ______________________ FINDINGS: VERTEBRAE AND DISCS: Vertebral alignment and articulation are within normal limits. Vertebral body and disc space heights are maintained. Specifically, no compression deformities are noted and there is no spondylolisthesis. No displaced fractures are identified. There are no significant areas of bone lucency or sclerosis.  SPINAL CANAL AND FORAMINA: Patent without significant bony canal or foramina encroachment. PREVERTEBRAL SOFT TISSUES: Normal. VISIBLE PORTIONS OF POSTERIOR FOSSA/BRAIN: Normal. LUNG APICES: Clear. OTHER: None. __________________    1. No fracture or dislocation. Please note that this CT was performed supine. It is highly sensitive for fractures and dislocations but does not evaluate for ligamentous injury including significant instability. If the patient's symptoms persist or if otherwise clinically indicated, in erect plain film evaluation of the cervical spine is suggested. CT ABD PELV W CONT    Result Date: 9/1/2021  EXAM: CT of the abdomen and pelvis INDICATION: Colitis, abdominal distention, hyperkalemia and renal failure COMPARISON: CT 12/22/2020 TECHNIQUE: Axial CT imaging of the abdomen and pelvis was performed with intravenous contrast. Multiplanar reformats were generated. One or more dose reduction techniques were used on this CT: automated exposure control, adjustment of the mAs and/or kVp according to patient size, and iterative reconstruction techniques. The specific techniques used on this CT exam have been documented in the patient's electronic medical record. Digital Imaging and Communications in Medicine (DICOM) format image data are available to nonaffiliated external healthcare facilities or entities on a secure, media free, reciprocally searchable basis with patient authorization for at least a 12-month period after this study. _______________ FINDINGS: LOWER CHEST: Please see separately performed the protocol of the chest. Minor basilar atelectasis. Cardiac size is mildly pericardial effusion. LIVER, BILIARY: Liver is normal. No biliary dilation. Small gallstones present within a nondistended gallbladder. No pericholecystic inflammation. PANCREAS: Normal. SPLEEN: Normal. ADRENALS: Normal. KIDNEYS/URETERS/BLADDER: Symmetric renal enhancement. No hydronephrosis. No suspicious renal lesion.  Excreted contrast is present within the urinary bladder. PELVIC ORGANS: Unremarkable. VASCULATURE: Diffuse atherosclerotic vascular calcifications. No evidence of aneurysmal dilatation. LYMPH NODES: No enlarged lymph nodes. GASTROINTESTINAL TRACT: There is mild to moderate distention of the stomach and proximal small bowel without discrete point of transition. No free intraperitoneal gas. Multiple colonic diverticula without diverticulitis. No significant colonic wall thickening or inflammatory change appreciated. BONES: No acute or aggressive osseous abnormalities identified. OTHER: None. _______________     1. No significant colonic wall thickening or inflammatory change to suggest colitis. 2. Mild/moderate gaseous distention of stomach and proximal small bowel, without discrete area of obstruction noted. Findings are nonspecific and may reflect a reactive ileus. 3. No evidence of obstructive uropathy. XR CHEST PORT    Result Date: 8/27/2021  EXAM: CHEST RADIOGRAPH, SINGLE VIEW CLINICAL INDICATION/HISTORY: Central line placement COMPARISON: 8/26/2021 TECHNIQUE: Portable frontal view of the chest was obtained. _______________ FINDINGS: SUPPORT DEVICES: Interval placement of right subclavian central venous catheter with tip overlying distal SVC. HEART AND MEDIASTINUM: Cardiomediastinal silhouette appears unchanged, mildly enlarged. Tortuous and atherosclerotic thoracic aorta. Mild central vascular congestion. LUNGS AND PLEURAL SPACES: I will diffusely increased interstitial markings. No confluent airspace opacity. No pleural effusion or pneumothorax. BONY THORAX AND SOFT TISSUES: No acute osseous abnormality. _______________     1. Adequate right subclavian central venous catheter placement with no postprocedural complication. 2. Cardiomegaly with central vascular congestion and interstitial pulmonary edema, unchanged.     XR CHEST PORT    Result Date: 8/26/2021  EXAM: XR CHEST PORT CLINICAL INDICATION/HISTORY: altered -Additional: Dialysis COMPARISON: 8/20/2021, 12/22/2020 TECHNIQUE: Frontal view of the chest _______________ FINDINGS: HEART AND MEDIASTINUM: Midline prominent cardiac silhouette, overall stable appearance to prior exam. Stable mediastinal contours. Bilateral hilar vascular congestion is present. LUNGS AND PLEURAL SPACES: Mild diffuse bilateral hazy indistinct interstitial markings with perihilar to lower lung predominance. No consolidation, pneumothorax or significant pleural effusion. BONY THORAX AND SOFT TISSUES: Chronic old healed right posterior seventh rib fracture. _______________     1. Plain film findings suggestive of edema edema/atelectasis, favoring fluid overload over CHF given the patient's of dialysis history. XR CHEST PORT    Result Date: 8/20/2021  EXAM: XR CHEST PORT CLINICAL INDICATION/HISTORY: dialysis -Additional: None COMPARISON: 12/22/2020 TECHNIQUE: Portable frontal view of the chest _______________ FINDINGS: SUPPORT DEVICES: None. HEART AND MEDIASTINUM: Cardiomediastinal silhouette within normal limits. LUNGS AND PLEURAL SPACES: No dense consolidation, large effusion or pneumothorax. _______________     No acute cardiopulmonary abnormality. ECHO ADULT COMPLETE    Result Date: 8/27/2021  · Echo study was limited due to patient's tolerance. · LV: Estimated LVEF is 50 - 55%. Normal cavity size. Moderate concentric hypertrophy. Low normal systolic function. Unable to assess diastolic function due to elevated heart rate. · LA: Mildly dilated left atrium. · RV: Not well visualized. Normal global systolic function. · MV: Mitral valve non-specific thickening. Mitral valve leaflet calcification without reduced excursion. · PA: Mild pulmonary hypertension. Pulmonary arterial systolic pressure is 33 mmHg.           Intake/Output Summary (Last 24 hours) at 9/3/2021 1617  Last data filed at 9/2/2021 2335  Gross per 24 hour   Intake    Output 300 ml   Net -300 ml      Recent Labs     09/03/21  0740 WBC 5.7     Lab Results   Component Value Date/Time    Sodium 136 09/03/2021 07:40 AM    Potassium 4.5 09/03/2021 07:40 AM    Chloride 101 09/03/2021 07:40 AM    CO2 26 09/03/2021 07:40 AM    Anion gap 9 09/03/2021 07:40 AM    Glucose 76 09/03/2021 07:40 AM    BUN 29 (H) 09/03/2021 07:40 AM    Creatinine 7.20 (H) 09/03/2021 07:40 AM    BUN/Creatinine ratio 4 (L) 09/03/2021 07:40 AM    GFR est AA 10 (L) 09/03/2021 07:40 AM    GFR est non-AA 8 (L) 09/03/2021 07:40 AM    Calcium 8.2 (L) 09/03/2021 07:40 AM      Allergies   Allergen Reactions    Penicillins Rash    Vancomycin Other (comments)     kayla syndrome     Current Facility-Administered Medications   Medication Dose Route Frequency Provider Last Rate Last Admin    QUEtiapine (SEROquel) tablet 12.5 mg  12.5 mg Oral QHS Opal Spurling, MD        insulin lispro (HUMALOG) injection   SubCUTAneous AC&HS Sharmin Watson MD        LORazepam (ATIVAN) tablet 1 mg  1 mg Oral Q4H PRN Opal Spurling, MD        LORazepam (ATIVAN) injection 1 mg  1 mg IntraVENous Q4H PRN Opal Spurling, MD   1 mg at 09/02/21 1336    losartan (COZAAR) tablet 50 mg  50 mg Oral DAILY José Alston MD   50 mg at 09/03/21 0846    diphenhydrAMINE (BENADRYL) injection 12.5 mg  12.5 mg IntraVENous Q6H PRN Mary Lee MD   12.5 mg at 09/01/21 3876    thiamine (B-1) injection 100 mg  100 mg IntraMUSCular DAILY José Alston MD   100 mg at 09/03/21 0850    labetaloL (NORMODYNE;TRANDATE) 20 mg/4 mL (5 mg/mL) injection 10 mg  10 mg IntraVENous Q6H PRN José Alston MD        hydrALAZINE (APRESOLINE) 20 mg/mL injection 20 mg  20 mg IntraVENous Q6H PRN José Alston MD        glucose chewable tablet 16 g  4 Tablet Oral PRN Mary Lee MD        glucagon (GLUCAGEN) injection 1 mg  1 mg IntraMUSCular PRN Mary Lee MD        dextrose (D50W) injection syrg 12.5-25 g  25-50 mL IntraVENous PRN Mary Lee MD   12.5 g at 08/31/21 0982    aspirin delayed-release tablet 81 mg  81 mg Oral DAILY Wilson Nelson MD   81 mg at 09/03/21 0846    paricalcitoL (ZEMPLAR) injection 4 mcg  4 mcg IntraVENous DIALYSIS MARIA GUADALUPE PAK & José Rangel MD   4 mcg at 08/30/21 1318    acetaminophen (TYLENOL) tablet 650 mg  650 mg Oral Q6H PRN Anayeli Watson MD   650 mg at 08/28/21 0242    Or    acetaminophen (TYLENOL) suppository 650 mg  650 mg Rectal Q6H PRN Anayeli Watson MD        polyethylene glycol (MIRALAX) packet 17 g  17 g Oral DAILY PRN Anayeli Watson MD        heparin (porcine) injection 5,000 Units  5,000 Units SubCUTAneous Q8H Anayeli Watson MD   5,000 Units at 09/03/21 1315    promethazine (PHENERGAN) tablet 12.5 mg  12.5 mg Oral Q6H PRN Anayeli Watson MD        Or    ondansetron (ZOFRAN) injection 4 mg  4 mg IntraVENous Q6H PRN Anayeli Watson MD   4 mg at 09/01/21 0435    pantoprazole (PROTONIX) injection 40 mg  40 mg IntraVENous Q24H Anayeli Watson MD   40 mg at 09/03/21 0320    amLODIPine (NORVASC) tablet 10 mg  10 mg Oral DAILY Annamarie Dobson MD   10 mg at 09/03/21 0846    carvediloL (COREG) tablet 25 mg  25 mg Oral BID WITH MEALS Annamarie Dobson MD   25 mg at 09/03/21 0846    isosorbide mononitrate ER (IMDUR) tablet 60 mg  60 mg Oral DAILY Annamarie Dobson MD   60 mg at 09/03/21 0846    sevelamer carbonate (RENVELA) tab 1,600 mg  1,600 mg Oral TID WITH MEALS José Alston MD   1,600 mg at 09/03/21 1133    heparin (porcine) 1,000 unit/mL injection 2,000 Units  2,000 Units Hemodialysis DIALYSIS PRN Cherise Mcdaniel MD

## 2021-09-03 NOTE — PROGRESS NOTES
[facility]  (A Division of [address] [place] [address])                                                                                                                       Emily Braden MD  Office #:     [phone]   Office Fax: [phone]     Date of Admission: 8/26/2021Date of Note: 9/3/2021      Reason for FU: I was asked to see patient by Dr Sylvester Lester for evaluation of chronic diarrhea with concern for C.diff. Dr Juan Akbar will cover THE [facility] over the weekend. I will cover by phone on Labour day, Sept 6 and resume routine rounding on Tuesday, Sept 7 -2021. Please call via  or [employer] for questions/consults over weekend. Thanks. Current Antimicrobials:    Prior Antimicrobials:  None       Cefepime/Levofloxacin 8/27 to 9/2/21       Assessment- ID related:  --------------------------------------------------------------------------  · Chronic diarrhhea --Etiology unclear. Post infectious IBS? · History of C.diff infection -12/23/2020- C.diff toxin +  08/20/21- indeterminate C.diff, pcr negative  08/27/21- Indeterminate C.diff, PCR negative  08/27/21- Enteric bacterial screen: negative    · L heel chronic ulcer-- probable chronic calcneal OM  ESBL Klebsiella/P.mirablis/MSSA from wound culture    This doesn't seem fit recurrent/ Chronic?)  C.diff and a remote possiblity. Certainly FU PCR until final, and avoid systemic abx as much as possible. Given ABX exposure and prior hx of C.diff at risk for that though. No colitis on CT.        Other Medical Issues- Mx per respective team:  ESRD on HD-poor complaince  Hypoglycemia on admission  Uremic/ Toxic metabolic encephalopathy on admission  BKA R  Psych issues    Past ID Issues:  L calceneal OM/ infection with MSSA/Citrobacter-2/2020 Recommendation for ID issues I am following:  ------------------------------------------------------------------------------    Further RAMIREZ with O/P and Staining for pathogens such as Cryptosporidium, microsporidium, stool wbc/occult blood, gardia chec- FU stool test    Consider GI further eval for chronic diarrhea    DC Cefepime/ Levofloxacin-- especially FQ's- avoid as he has damaged tendon already and high risk for CDI recurrence. Wound care for Left heel ulcer. Can plan bone biopsy in 10-14 days for panculture and histology if RAMIREZ for active infection is to be pursued- can be done as OP    Contact isolaton needed for ESBL Kleb in wound            Condition: Stable    Subjective:  Pt was sleeping, appears comfortable, didn't wake him  DW nursing and reports no new issue  Were able to send off additional stool test, was semi-formed stool per RN  No fever/chills       HPI:  Zurdo Valle. is 54 y.o. BLACK/ male with previous co-morbidities as listed in PMH   of chronic kidney disease on dialysis MWF, right BKA, diabetes, congestive heart failure last EF 40%, hypertension, sickle cell trait admitted 8/26 -was found unresponsive. with Toxic metabolic encephalopathy/ Hyperkalemia and had undegone ABX rx, supportive treatment and has improved. I was asked to see him about chronic diarrhea- informed by Dr Michelle Huizar that he gets up multiple time to go for BM during HD. Though getting clear history was difficult from patient as he goes off topic with answers, he admits he does that, feels like going to [facility] soon after he eats, and what he eats seem to come out sometimes. No Fever or abd pain, nausea or vomiting. No blood in stool. He couldn't give duration of problem. RAMIREZ so far as mentioned above. He also has L heel chronic ulcers with question of OM- on L heel, had MRI /c-ray of L foot done.  And superficial wound cultures from L heel reviewed.          Active Hospital Problems    Diagnosis Date Noted    Toxic metabolic encephalopathy 31/54/8644    Hyperkalemia 08/27/2021    Hypertensive crisis 08/27/2021    Uremia 08/27/2021    Hypoglycemia associated with diabetes ([facility] 08/27/2021    Non-compliance with renal dialysis ([facility] 08/27/2021    Cellulitis and abscess of foot 01/29/2020    Hx of BKA, right ([facility] 01/29/2020     Past Medical History:   Diagnosis Date    Chronic kidney disease     Diabetes ([facility]     Heart failure ([facility]     Hypertension     Sickle cell trait ([facility]      Past Surgical History:   Procedure Laterality Date    HX ORTHOPAEDIC      right BKA 2017     No family history on file. Social History     Socioeconomic History    Marital status: LEGALLY      Spouse name: Not on file    Number of children: Not on file    Years of education: Not on file    Highest education level: Not on file   Occupational History    Not on file   Tobacco Use    Smoking status: Former Smoker    Smokeless tobacco: Never Used   Substance and Sexual Activity    Alcohol use: No    Drug use: No    Sexual activity: Not on file   Other Topics Concern    Not on file   Social History Narrative    Not on file     Social Determinants of Health     Financial Resource Strain:     Difficulty of Paying Living Expenses:    Food Insecurity:     Worried About [address] in the Last Year:     [address] [place] [employer] in the Last Year:    Transportation Needs:     Lack of Transportation (Medical):  Lack of Transportation (Non-Medical):    Physical Activity:     Days of Exercise per Week:     Minutes of Exercise per Session:    Stress:     Feeling of Stress :    Social Connections:     Frequency of Communication with Friends and Family:     Frequency of Social Gatherings with Friends and Family:     Attends Confucianist Services:     Active Member of Clubs or Organizations:     Attends Club or Organization Meetings:     Marital Status:    Intimate Partner Violence:     Fear of Current or Ex-Partner:     Emotionally Abused:     Physically Abused:     Sexually Abused:         Allergies:  Penicillins and Vancomycin     Medications:  Current Facility-Administered Medications   Medication Dose Route Frequency    QUEtiapine (SEROquel) tablet 12.5 mg  12.5 mg Oral QHS    [START ON 9/4/2021] epoetin delonte-epbx (RETACRIT) injection 3,000 Units  3,000 Units IntraVENous Q TUE, THU & SAT    insulin lispro (HUMALOG) injection   SubCUTAneous AC&HS    LORazepam (ATIVAN) tablet 1 mg  1 mg Oral Q4H PRN    LORazepam (ATIVAN) injection 1 mg  1 mg IntraVENous Q4H PRN    losartan (COZAAR) tablet 50 mg  50 mg Oral DAILY    diphenhydrAMINE (BENADRYL) injection 12.5 mg  12.5 mg IntraVENous Q6H PRN    thiamine (B-1) injection 100 mg  100 mg IntraMUSCular DAILY    labetaloL (NORMODYNE;TRANDATE) 20 mg/4 mL (5 mg/mL) injection 10 mg  10 mg IntraVENous Q6H PRN    hydrALAZINE (APRESOLINE) 20 mg/mL injection 20 mg  20 mg IntraVENous Q6H PRN    glucose chewable tablet 16 g  4 Tablet Oral PRN    glucagon (GLUCAGEN) injection 1 mg  1 mg IntraMUSCular PRN    dextrose (D50W) injection syrg 12.5-25 g  25-50 mL IntraVENous PRN    aspirin delayed-release tablet 81 mg  81 mg Oral DAILY    paricalcitoL (ZEMPLAR) injection 4 mcg  4 mcg IntraVENous DIALYSIS MON, WED & FRI    acetaminophen (TYLENOL) tablet 650 mg  650 mg Oral Q6H PRN    Or    acetaminophen (TYLENOL) suppository 650 mg  650 mg Rectal Q6H PRN    polyethylene glycol (MIRALAX) packet 17 g  17 g Oral DAILY PRN    heparin (porcine) injection 5,000 Units  5,000 Units SubCUTAneous Q8H    promethazine (PHENERGAN) tablet 12.5 mg  12.5 mg Oral Q6H PRN    Or    ondansetron (ZOFRAN) injection 4 mg  4 mg IntraVENous Q6H PRN    pantoprazole (PROTONIX) injection 40 mg  40 mg IntraVENous Q24H    amLODIPine (NORVASC) tablet 10 mg  10 mg Oral DAILY    carvediloL (COREG) tablet 25 mg  25 mg Oral BID WITH MEALS    isosorbide mononitrate ER (IMDUR) tablet 60 mg  60 mg Oral DAILY    sevelamer carbonate (RENVELA) tab 1,600 mg  1,600 mg Oral TID WITH MEALS    heparin (porcine) 1,000 unit/mL injection 2,000 Units  2,000 Units Hemodialysis DIALYSIS PRN        ROS:  Behavioral/Psychiatric: He goes off question and has to be redirected many times to give answers. Some of his answers doesn't make sense to question asked     Physical Exam:    Temp (24hrs), Av.2 °F (36.8 °C), Min:97.7 °F (36.5 °C), Max:98.6 °F (37 °C)    Visit Vitals  BP (!) 111/52 (BP 1 Location: Right upper arm, BP Patient Position: At rest)   Pulse 75   Temp 98.3 °F (36.8 °C)   Resp 18   Ht 5' 7\" (1.702 m)   Wt 76.9 kg (169 lb 8 oz)   SpO2 98%   BMI 26.55 kg/m²     R chest Central line  Left HD access     GEN: WD chronically ill looking,  not in resp distress. CHEST: Non laboured breathing.    CVS:RRR, no mur/gallop  YOVANY: Deferred       Microbiology  All Micro Results     Procedure Component Value Units Date/Time    OVA & PARASITES, STOOL [[id number]] Collected: 21 1445    Order Status: Completed Specimen: Feces from Stool Updated: 21 145    CULTURE, BLOOD [[id number]] Collected: 215    Order Status: Completed Specimen: Blood Updated: 2116     Special Requests: NO SPECIAL REQUESTS        Culture result: NO GROWTH 6 DAYS       CULTURE, BLOOD [[id number]] Collected: 21 0030    Order Status: Completed Specimen: Blood Updated: 21     Special Requests: NO SPECIAL REQUESTS        Culture result: NO GROWTH 6 DAYS       CULTURE, WOUND Lawrence Claw STAIN [[id number]]  (Abnormal)  (Susceptibility) Collected: 21 1930    Order Status: Completed Specimen: Wound from Foot Updated: 21 1626     Special Requests: NO SPECIAL REQUESTS        GRAM STAIN RARE WBCS SEEN         RARE GRAM POSITIVE RODS        Culture result:       LIGHT KLEBSIELLA OXYTOCA ** (EXTENDED SPECTRUM BETA LACTAMASE ) **                  MODERATE PROTEUS MIRABILIS                  LIGHT  POSSIBLE  3RD GRAM NEGATIVE CYN  (OXIDASE POSITIVE, SENDING TO LAB [facility] FOR ID/SENSITIVITIES)  REFER TO [id number]              MODERATE STAPHYLOCOCCUS AUREUS          ENTERIC BACTERIA PANEL, DNA [[id number]] Collected: 08/27/21 1735    Order Status: Completed Specimen: Stool Updated: 08/28/21 2124     Shigella species, DNA Negative        Campylobacter species, DNA Negative        Vibrio species, DNA Negative        Enterotoxigen E Coli, DNA Negative        Shiga toxin producing, DNA Negative        Salmonella species, DNA Negative        P. shigelloides, DNA Negative        Y. enterocolitica, DNA Negative       C. DIFFICILE AG & TOXIN A/B [[id number]]  (Abnormal) Collected: 08/27/21 1735    Order Status: Completed Specimen: Stool from Miscellaneous sample Updated: 08/28/21 1416     GDH ANTIGEN Positive        C. difficile toxin Negative        PCR Reflex       See Reflex order for C. difficile (DNA)           INTERPRETATION       Indeterminate for Toxigenic C. difficile, DNA confirmation to follow. Casie Edwards DIFF MOLECULAR [[id number]] Collected: 08/27/21 1735    Order Status: Completed Specimen: Miscellaneous sample Updated: 08/28/21 1416     C Diff Molecular Negative        Comment: This specimen is negative for toxigenic C difficile by DNA amplification. Repeat testing is not recommended for confirmation, samples received within 7 days of this negative result will be rejected.        RESPIRATORY VIRUS PANEL W/COVID-19, PCR [[id number]] Collected: 08/26/21 2200    Order Status: Completed Specimen: Nasopharyngeal Updated: 08/27/21 1103     Adenovirus Not detected        Coronavirus 229E Not detected        Coronavirus HKU1 Not detected        Coronavirus CVNL63 Not detected        Coronavirus OC43 Not detected        SARS-CoV-2, PCR Not detected        Metapneumovirus Not detected        Rhinovirus and Enterovirus Not detected        Influenza A Not detected        Influenza A, subtype H1 Not detected        Influenza A, subtype H3 Not detected        INFLUENZA A H1N1 PCR Not detected        Influenza B Not detected        Parainfluenza 1 Not detected        Parainfluenza 2 Not detected Parainfluenza 3 Not detected        Parainfluenza virus 4 Not detected        RSV by PCR Not detected        B. parapertussis, PCR Not detected        Bordetella pertussis - PCR Not detected        Chlamydophila pneumoniae DNA, QL, PCR Not detected        Mycoplasma pneumoniae DNA, QL, PCR Not detected       C. DIFFICILE AG & TOXIN A/B [[id number]]     Order Status: Canceled Specimen: Stool     CULTURE, URINE [[id number]] Collected: 08/26/21 1930    Order Status: Canceled Specimen: Clean catch            Lab results:    Chemistry  Recent Labs     09/03/21  0740 09/01/21  0710   GLU 76 94    136   K 4.5 3.9    101   CO2 26 27   BUN 29* 32*   CREA 7.20* 8.42*   CA 8.2* 7.9*   AGAP 9 8   BUCR 4* 4*       CBC w/ Diff  Recent Labs     09/03/21  0740   WBC 5.7   RBC 4.03*   HGB 10.4*   HCT 31.7*      GRANS 58   LYMPH 17*   EOS 5       Imaging: report posted per radiologist reading    8/29: CT AP  1. No significant colonic wall thickening or inflammatory change to suggest  colitis. 2. Mild/moderate gaseous distention of stomach and proximal small bowel, without  discrete area of obstruction noted. Findings are nonspecific and may reflect a  reactive ileus. 3. No evidence of obstructive uropathy. CT chest:  Mild exam limitations as noted. No evidence of PE. No acute pulmonary process  Identified    8/27: MRI of foot --Image reviewed by me as well  . IMPRESSION     1. Extensive cortical erosions with bony resorption and remodeling and  associated marrow signal abnormality throughout the posterior calcaneus  demonstrating significant interval progression since January 2020 compatible  with acute on chronic osteomyelitis. 2.  Associated overlying soft tissue ulceration and phlegmonous  changes/granulation tissue along the plantar soft tissues of the hindfoot  suggesting cellulitis with associated chronic disruption of the plantar fascia. No drainable abscess.   3.  High-grade partial/near complete tear of the distal ACL. 4.  Peroneus brevis tendinosis with a longitudinal split tear.     Initial preliminary report was provided to the ED by the on-call [facility]  teleradiology service. Foot X-ray: 8/27-- Image reviewed by me as well  Cellulitis and ulceration involving left hindfoot with findings suggestive of  chronic osteomyelitis involving the calcaneus. No radiographic evidence of  active osteomyelitis within the left foot.

## 2021-09-03 NOTE — PROGRESS NOTES
Bedside and verbal shift change report recieved from NAHEED Ochoa (offgoing nurse) given to George Valadez RN (oncoming nurse). Report included the following information SBAR, Kardex, Intake/Output, MAR and Recent Results       6420- Nurse reported patient BG 76. 4 oz juice given will recheck. No s/s hypoglycemia noted. 1209-BG rechecked 111. Patient remains stable. No s/s hypo/hyperglycemia noted. 1452- Stool specimen collected and sent to lab, awaiting results at this time. Bedside and verbal shift change report given to NAHEED Do (oncoming nurse) by Willem Louis RN (offgoing nurse).  Report included the following information SBAR, Kardex, Intake/Output, MAR and Recent Results

## 2021-09-03 NOTE — PROGRESS NOTES
Hospitalist Progress Note    Patient: Taina Lorenzo MRN: [id number]  CSN: [id number]    YOB: 1970  Age: 54 y.o.   Sex: male    DOA: 8/26/2021 LOS:  LOS: 7 days          Chief Complaint:    AMS      Assessment/Plan     54 y.o.  male who has history of chronic kidney disease on dialysis Monday Wednesday Friday, right BKA, diabetes, congestive heart failure last EF 40%, hypertension, sickle cell trait presents to the emergency room via EMS was found unresponsive.     Unresponsiveness resolved  No stroke on MRI  Ammonia nl  Dialyzed again 9/2  History of noncompliance     BKA     Metabolic encephalopathy with paranoia features and bizarre thoughts-brother states he has no psych history so suspect this is primarily metabolic     Hypoglycemia   resolved today     ESRD   HD per nephrology     Chronic diarrhea   improving  Seen by ID  All abx stopped  Does have hx of c diff in 2020     Toxic metabolic encephalopathy  Seen by neurology  Continue with aspirin  MRI neg for stroke     Uncontrolled HTN, coming down now     Heel ulcer -  Seen by podiatry  Off abx now     Seizure like activity improved likely from low sugar , resolved     Pneumonia on xray, no symptoms, not on CT, no clinical sx     Full code  Start PT if he will cooperate      Disposition :  Patient Active Problem List   Diagnosis Code    Hyponatremia E87.1    Acute on chronic renal insufficiency N28.9, N18.9    Iron deficiency anemia D50.9    Other restrictive cardiomyopathy ([address] I42.5    ESRD (end stage renal disease) on dialysis ([address] N18.6, Z99.2    Cellulitis and abscess of foot L03.119, L02.619    HTN (hypertension) I10    Hx of BKA, right ([address] Z89.511    Hypoglycemia E16.2    Hypertensive urgency I16.0    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    Hyperkalemia E87.5    Hypertensive crisis I16.9    Uremia N19    Hypoglycemia associated with diabetes ([address] E11.649    Non-compliance with renal dialysis ([address] Z91.15    Toxic metabolic encephalopathy Y20       Subjective:    He is awake  States he needs a bedpan  Did not want to answer my other questions    Review of systems:    UTO, he is alert and able to speak but did not want to engage in my questioning, just wanted to have bedpan and have nurse help him      Vital signs/Intake and Output:  Visit Vitals  BP (!) 126/54 (BP 1 Location: Right upper arm, BP Patient Position: At rest)   Pulse 87   Temp 98.6 °F (37 °C)   Resp 18   Ht 5' 7\" (1.702 m)   Wt 76.9 kg (169 lb 8 oz)   SpO2 100%   BMI 26.55 kg/m²     Current Shift:  No intake/output data recorded. Last three shifts:  09/01 1901 - 09/03 0700  In: -   Out: 1828 [Urine:300]    Exam:    General: Well developed, alert, NAD  CVS:Regular rate and rhythm, no M/R/G, S1/S2 heard, no thrill  Lungs:Clear to auscultation bilaterally, no wheezes, rhonchi, or rales  Abdomen: Soft, Nontender, No distention, Normal Bowel sounds, No hepatomegaly  Extremities: BKA, left heel dressed  Neuro:grossly normal , follows commands  Psych:appropriate                Labs: Results:       Chemistry Recent Labs     09/03/21  0740 09/01/21  0710   GLU 76 94    136   K 4.5 3.9    101   CO2 26 27   BUN 29* 32*   CREA 7.20* 8.42*   CA 8.2* 7.9*   AGAP 9 8   BUCR 4* 4*      CBC w/Diff Recent Labs     09/03/21  0740   WBC 5.7   RBC 4.03*   HGB 10.4*   HCT 31.7*      GRANS 58   LYMPH 17*   EOS 5      Cardiac Enzymes No results for input(s): CPK, CKND1, WILLIE in the last 72 hours. No lab exists for component: CKRMB, TROIP   Coagulation No results for input(s): PTP, INR, APTT, INREXT, INREXT in the last 72 hours.     Lipid Panel Lab Results   Component Value Date/Time    Cholesterol, total 146 08/30/2021 02:50 AM    HDL Cholesterol 45 08/30/2021 02:50 AM    LDL, calculated 81 08/30/2021 02:50 AM    VLDL, calculated 20 08/30/2021 02:50 AM    Triglyceride 100 08/30/2021 02:50 AM    CHOL/HDL Ratio 3.2 08/30/2021 02:50 AM      BNP No results for input(s): BNPP in the last 72 hours. Liver Enzymes No results for input(s): TP, ALB, TBIL, AP in the last 72 hours.     No lab exists for component: SGOT, GPT, DBIL   Thyroid Studies Lab Results   Component Value Date/Time    TSH 2.38 08/29/2021 01:20 AM        Procedures/imaging: see electronic medical records for all procedures/Xrays and details which were not copied into this note but were reviewed prior to creation of Marianne Quiñones MD

## 2021-09-03 NOTE — PROGRESS NOTES
9/3/2021 PT note: consult received and chart reviewed. Evaluation attempted. Pt lethargic, opens eyes, then closes them. Nods head affirmative when asked if sleepy, and shakes head no when asked if in pain. Pt bed length extended to allow L LE to rest w/o foot on foot board. Unable to participate for PT evaluation at this time. Nurse Radha Plants notified of above and will f/u at later time as pt schedule allows for PT evaluation. Thank you for this referral.   Tereso Feng, PT    7315: PT evaluation attempted: Pt awake, soiled with liquid BM and pt ? Manipulating same with hands. Nurse Radha [employer] notified. Will f/up accordingly.   Tereso Feng, PT

## 2021-09-03 NOTE — ROUTINE PROCESS
Bedside and Verbal shift change report given to Edita Landin RN by Morris So. Report included the following information SBAR, Kardex, OR Summary, Intake/Output and MAR.

## 2021-09-03 NOTE — PROGRESS NOTES
Care Management    Discharge Planning: In progress    CM notes that the patient is currently pending therapy evaluation and is not medically stable for discharge at this time. CM to follow the patient's progress and be available to assist with discharge planning as needed. CMS referral placed. Care Management Interventions  PCP Verified by CM: Yes  Palliative Care Criteria Met (RRAT>21 & CHF Dx)?: No  Current Support Network:  Other  Confirm Follow Up Transport: Family

## 2021-09-04 LAB
ANION GAP SERPL CALC-SCNC: 8 MMOL/L (ref 3–18)
BASOPHILS # BLD: 0 K/UL (ref 0–0.1)
BASOPHILS NFR BLD: 1 % (ref 0–2)
BUN SERPL-MCNC: 45 MG/DL (ref 7–18)
BUN/CREAT SERPL: 5 (ref 12–20)
CALCIUM SERPL-MCNC: 7.8 MG/DL (ref 8.5–10.1)
CHLORIDE SERPL-SCNC: 100 MMOL/L (ref 100–111)
CO2 SERPL-SCNC: 28 MMOL/L (ref 21–32)
CREAT SERPL-MCNC: 9.03 MG/DL (ref 0.6–1.3)
DIFFERENTIAL METHOD BLD: ABNORMAL
EOSINOPHIL # BLD: 0.3 K/UL (ref 0–0.4)
EOSINOPHIL NFR BLD: 6 % (ref 0–5)
ERYTHROCYTE [DISTWIDTH] IN BLOOD BY AUTOMATED COUNT: 20.5 % (ref 11.6–14.5)
GLUCOSE BLD STRIP.AUTO-MCNC: 110 MG/DL (ref 70–110)
GLUCOSE BLD STRIP.AUTO-MCNC: 164 MG/DL (ref 70–110)
GLUCOSE BLD STRIP.AUTO-MCNC: 85 MG/DL (ref 70–110)
GLUCOSE BLD STRIP.AUTO-MCNC: 89 MG/DL (ref 70–110)
GLUCOSE SERPL-MCNC: 102 MG/DL (ref 74–99)
HCT VFR BLD AUTO: 30.1 % (ref 36–48)
HGB BLD-MCNC: 10.1 G/DL (ref 13–16)
LYMPHOCYTES # BLD: 1.1 K/UL (ref 0.9–3.6)
LYMPHOCYTES NFR BLD: 19 % (ref 21–52)
MAGNESIUM SERPL-MCNC: 2.6 MG/DL (ref 1.6–2.6)
MCH RBC QN AUTO: 26 PG (ref 24–34)
MCHC RBC AUTO-ENTMCNC: 33.6 G/DL (ref 31–37)
MCV RBC AUTO: 77.6 FL (ref 78–100)
MONOCYTES # BLD: 0.9 K/UL (ref 0.05–1.2)
MONOCYTES NFR BLD: 16 % (ref 3–10)
NEUTS SEG # BLD: 3.3 K/UL (ref 1.8–8)
NEUTS SEG NFR BLD: 58 % (ref 40–73)
PHOSPHATE SERPL-MCNC: 6.8 MG/DL (ref 2.5–4.9)
PLATELET # BLD AUTO: 168 K/UL (ref 135–420)
PMV BLD AUTO: 9.5 FL (ref 9.2–11.8)
POTASSIUM SERPL-SCNC: 4.9 MMOL/L (ref 3.5–5.5)
RBC # BLD AUTO: 3.88 M/UL (ref 4.35–5.65)
SODIUM SERPL-SCNC: 136 MMOL/L (ref 136–145)
WBC # BLD AUTO: 5.7 K/UL (ref 4.6–13.2)

## 2021-09-04 PROCEDURE — 36415 COLL VENOUS BLD VENIPUNCTURE: CPT

## 2021-09-04 PROCEDURE — 74011250637 HC RX REV CODE- 250/637: Performed by: PSYCHIATRY & NEUROLOGY

## 2021-09-04 PROCEDURE — 74011250637 HC RX REV CODE- 250/637: Performed by: STUDENT IN AN ORGANIZED HEALTH CARE EDUCATION/TRAINING PROGRAM

## 2021-09-04 PROCEDURE — 74011250636 HC RX REV CODE- 250/636: Performed by: INTERNAL MEDICINE

## 2021-09-04 PROCEDURE — 65660000000 HC RM CCU STEPDOWN

## 2021-09-04 PROCEDURE — 74011250636 HC RX REV CODE- 250/636: Performed by: STUDENT IN AN ORGANIZED HEALTH CARE EDUCATION/TRAINING PROGRAM

## 2021-09-04 PROCEDURE — 85025 COMPLETE CBC W/AUTO DIFF WBC: CPT

## 2021-09-04 PROCEDURE — 90935 HEMODIALYSIS ONE EVALUATION: CPT

## 2021-09-04 PROCEDURE — 74011636637 HC RX REV CODE- 636/637: Performed by: INTERNAL MEDICINE

## 2021-09-04 PROCEDURE — 74011250637 HC RX REV CODE- 250/637: Performed by: INTERNAL MEDICINE

## 2021-09-04 PROCEDURE — 84100 ASSAY OF PHOSPHORUS: CPT

## 2021-09-04 PROCEDURE — 80048 BASIC METABOLIC PNL TOTAL CA: CPT

## 2021-09-04 PROCEDURE — 82962 GLUCOSE BLOOD TEST: CPT

## 2021-09-04 PROCEDURE — 83735 ASSAY OF MAGNESIUM: CPT

## 2021-09-04 PROCEDURE — C9113 INJ PANTOPRAZOLE SODIUM, VIA: HCPCS | Performed by: INTERNAL MEDICINE

## 2021-09-04 RX ORDER — PARICALCITOL 5 UG/ML
4 INJECTION, SOLUTION INTRAVENOUS
Status: DISCONTINUED | OUTPATIENT
Start: 2021-09-04 | End: 2021-09-06 | Stop reason: HOSPADM

## 2021-09-04 RX ADMIN — THIAMINE HYDROCHLORIDE 100 MG: 100 INJECTION, SOLUTION INTRAMUSCULAR; INTRAVENOUS at 09:25

## 2021-09-04 RX ADMIN — INSULIN LISPRO 2 UNITS: 100 INJECTION, SOLUTION INTRAVENOUS; SUBCUTANEOUS at 17:28

## 2021-09-04 RX ADMIN — CARVEDILOL 25 MG: 12.5 TABLET, FILM COATED ORAL at 17:29

## 2021-09-04 RX ADMIN — HEPARIN SODIUM 5000 UNITS: 5000 INJECTION INTRAVENOUS; SUBCUTANEOUS at 21:53

## 2021-09-04 RX ADMIN — PANTOPRAZOLE SODIUM 40 MG: 40 INJECTION, POWDER, FOR SOLUTION INTRAVENOUS at 04:09

## 2021-09-04 RX ADMIN — ISOSORBIDE MONONITRATE 60 MG: 60 TABLET, EXTENDED RELEASE ORAL at 09:24

## 2021-09-04 RX ADMIN — CARVEDILOL 25 MG: 12.5 TABLET, FILM COATED ORAL at 09:24

## 2021-09-04 RX ADMIN — HEPARIN SODIUM 5000 UNITS: 5000 INJECTION INTRAVENOUS; SUBCUTANEOUS at 06:46

## 2021-09-04 RX ADMIN — AMLODIPINE BESYLATE 10 MG: 5 TABLET ORAL at 09:22

## 2021-09-04 RX ADMIN — SEVELAMER CARBONATE 1600 MG: 800 TABLET, FILM COATED ORAL at 17:30

## 2021-09-04 RX ADMIN — LOSARTAN POTASSIUM 50 MG: 50 TABLET, FILM COATED ORAL at 09:23

## 2021-09-04 RX ADMIN — SEVELAMER CARBONATE 1600 MG: 800 TABLET, FILM COATED ORAL at 15:50

## 2021-09-04 RX ADMIN — EPOETIN ALFA-EPBX 3000 UNITS: 3000 INJECTION, SOLUTION INTRAVENOUS; SUBCUTANEOUS at 22:07

## 2021-09-04 RX ADMIN — PARICALCITOL 4 MCG: 5 INJECTION, SOLUTION INTRAVENOUS at 12:12

## 2021-09-04 RX ADMIN — ASPIRIN 81 MG: 81 TABLET, COATED ORAL at 09:23

## 2021-09-04 RX ADMIN — HEPARIN SODIUM 5000 UNITS: 5000 INJECTION INTRAVENOUS; SUBCUTANEOUS at 14:47

## 2021-09-04 NOTE — PROGRESS NOTES
9/4/2021 PT note: consult received and chart reviewed. Evaluation attempted. Pt currently with HD in progress. Will f/u at later time as pt schedule allows for PT evaluation. Thank you for this referral.   Jordyn Lipscomb, PT        1555:  PT evaluation attempted. HD remains in progress. Will f/up tomorrow accordingly.  Than you for this referral.  Jordyn Lipscomb, PT

## 2021-09-04 NOTE — PROGRESS NOTES
Assessment:     ESRD  HTN  Anemia  Non complinace  BKA  CHF  DM        Plan:   HD today then MWF  Retacrit  Calcitriol      CC: ESRD  Interval History: no overnight events        Subjective:   Pt is  Awaken responds but drift back to sleep                    Blood pressure (!) 107/59, pulse 74, temperature 97.7 °F (36.5 °C), temperature source Axillary, resp. rate 16, height 5' 7\" (1.702 m), weight 76.2 kg (167 lb 15.9 oz), SpO2 100 %.       No edema  Right BKA  Left leg wound noted  Left AVF is patent          Intake/Output Summary (Last 24 hours) at 9/4/2021 1239  Last data filed at 9/4/2021 0846  Gross per 24 hour   Intake 480 ml   Output    Net 480 ml      Recent Labs     09/04/21  0405   WBC 5.7     Lab Results   Component Value Date/Time    Sodium 136 09/04/2021 04:05 AM    Potassium 4.9 09/04/2021 04:05 AM    Chloride 100 09/04/2021 04:05 AM    CO2 28 09/04/2021 04:05 AM    Anion gap 8 09/04/2021 04:05 AM    Glucose 102 (H) 09/04/2021 04:05 AM    BUN 45 (H) 09/04/2021 04:05 AM    Creatinine 9.03 (H) 09/04/2021 04:05 AM    BUN/Creatinine ratio 5 (L) 09/04/2021 04:05 AM    GFR est AA 8 (L) 09/04/2021 04:05 AM    GFR est non-AA 6 (L) 09/04/2021 04:05 AM    Calcium 7.8 (L) 09/04/2021 04:05 AM        Current Facility-Administered Medications   Medication Dose Route Frequency Provider Last Rate Last Admin    paricalcitoL (ZEMPLAR) injection 4 mcg  4 mcg IntraVENous DIALYSIS TUE, THU & SAT Sumrall Oz, DO   4 mcg at 09/04/21 1212    epoetin delonte-epbx (RETACRIT) injection 3,000 Units  3,000 Units IntraVENous Q TUE, THU & SAT Yanet Landry R, DO        insulin lispro (HUMALOG) injection   SubCUTAneous AC&HS Faye Watson MD        LORazepam (ATIVAN) tablet 1 mg  1 mg Oral Q4H PRN Jill Church MD        LORazepam (ATIVAN) injection 1 mg  1 mg IntraVENous Q4H PRN Jill Church MD   1 mg at 09/02/21 1336    losartan (COZAAR) tablet 50 mg  50 mg Oral DAILY Prudencio Hutton MD   50 mg at 09/04/21 1278  diphenhydrAMINE (BENADRYL) injection 12.5 mg  12.5 mg IntraVENous Q6H PRN Willow Reyes MD   12.5 mg at 09/01/21 5432    thiamine (B-1) injection 100 mg  100 mg IntraMUSCular DAILY José Alston MD   100 mg at 09/04/21 0925    labetaloL (NORMODYNE;TRANDATE) 20 mg/4 mL (5 mg/mL) injection 10 mg  10 mg IntraVENous Q6H PRN Cherise Mcdaniel MD        hydrALAZINE (APRESOLINE) 20 mg/mL injection 20 mg  20 mg IntraVENous Q6H PRN José Alston MD        glucose chewable tablet 16 g  4 Tablet Oral PRN Willow Reyes MD        glucagon (GLUCAGEN) injection 1 mg  1 mg IntraMUSCular PRN Willow Reyes MD        dextrose (D50W) injection syrg 12.5-25 g  25-50 mL IntraVENous PRN Willow Reyes MD   12.5 g at 08/31/21 0914    aspirin delayed-release tablet 81 mg  81 mg Oral DAILY Wilson Nelson MD   81 mg at 09/04/21 1722    acetaminophen (TYLENOL) tablet 650 mg  650 mg Oral Q6H PRN Anayeli Watson MD   650 mg at 08/28/21 0242    Or    acetaminophen (TYLENOL) suppository 650 mg  650 mg Rectal Q6H PRN Anayeli Watson MD        polyethylene glycol (MIRALAX) packet 17 g  17 g Oral DAILY PRN Anayeli Watson MD        heparin (porcine) injection 5,000 Units  5,000 Units SubCUTAneous Q8H Anayeli Watson MD   5,000 Units at 09/04/21 0646    promethazine (PHENERGAN) tablet 12.5 mg  12.5 mg Oral Q6H PRN Anayeli Watson MD        Or    ondansetron (ZOFRAN) injection 4 mg  4 mg IntraVENous Q6H PRN Anayeli Watson MD   4 mg at 09/01/21 0435    amLODIPine (NORVASC) tablet 10 mg  10 mg Oral DAILY Annamarie Dobson MD   10 mg at 09/04/21 5917    carvediloL (COREG) tablet 25 mg  25 mg Oral BID WITH MEALS Annamarie Dobson MD   25 mg at 09/04/21 2331    isosorbide mononitrate ER (IMDUR) tablet 60 mg  60 mg Oral DAILY Annamarie Dobson MD   60 mg at 09/04/21 5540    sevelamer carbonate (RENVELA) tab 1,600 mg  1,600 mg Oral TID WITH MEALS José Alston MD   1,600 mg at 09/03/21 1710    heparin (porcine) 1,000 unit/mL injection 2,000 Units  2,000 Units Hemodialysis DIALYSIS SHANI Nation MD

## 2021-09-04 NOTE — PROGRESS NOTES
Problem: Patient Education: Go to Patient Education Activity  Goal: Patient/Family Education  Outcome: Progressing Towards Goal     Problem: Chronic Renal Failure  Goal: *Fluid and electrolytes stabilized  Outcome: Progressing Towards Goal     Problem: Altered Thought Process (Adult/Pediatric)  Goal: *LTG: Returns to baseline functioning  Outcome: Progressing Towards Goal  Goal: Interventions  Outcome: Progressing Towards Goal     Problem: Aspiration - Risk of  Goal: *Absence of aspiration  Outcome: Progressing Towards Goal

## 2021-09-04 NOTE — PROGRESS NOTES
Intervention: Monitor/Manage Fluid Electrolyte/Acid Base Balance    PROBLEM: HEMODIALYSIS ADULT    INTERVENTION: Hemodynamic stabilization  Maintain BP WNL for this patient while on hemodialysis    INTERVENTION: Fluid Management  Will attempt 1500 ml total fluid removal as tolerated    INTERVENTION: Metabolic / Electrolyte Imbalance Management  3 K+ potassium, 2.5 Ca calcium bath today with dialysis    GOAL: Signs and symptoms of listed potential problems will be absent or manageable  (reference Hemodialysis ADULT CPG)    OUTCOME: Progressing  HD planned for 3.75 hours today      Problem: Chronic Renal Failure  Goal: *Fluid and electrolytes stabilized  Outcome: Progressing Towards Goal     Problem: Patient Education: Go to Patient Education Activity  Goal: Patient/Family Education  Outcome: Progressing Towards Goal

## 2021-09-04 NOTE — PROGRESS NOTES
1034-PT has new dialysis days now to include Saturday, Tuesday and Thursday per Dialysis nurse, order modified for Zemplar. 1814-MD would like pt to get up with assistance in preparation for upcoming discharge.

## 2021-09-04 NOTE — PROGRESS NOTES
TREATMENT SUMMARY     1145 UF off B/P decreased 84/49 pulse 74,  250 mL N/S Bolus administered as per Dr Anna Soto, Neprologist    1212 Zemplar 4 mg administered IVP    1230 Dr. Anna Soto present at bedside. As per same Dialysate Concentration changed to Sodium 140 mEq/L. And Bicarb 32 mEq/L.    1315 UF on B/P 147/67 pulse 78     Patient in room 363 dialyzed for 3.75 hours. Tolerated tx well with without complaint or complications. LUE AVG functioning without complication accessing or BFR   mL/min   mL/min  2000 ml UF removed with a net UF removal of 461 ml. Bed in lowest position, call bell within reach. Report given to TEQUILA Casarez  RN with all questions answered. TREATMENT NOTES                                                                                                ACUTE HEMODIALYSIS FLOW SHEET       PATIENT INFORMATION   [address]       DR. 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____________ Code Status [x]Full Code [] DNR  [] Other_____   Diet: [x] Renal [] NPO [x] Diabetic   [] Enteral Feeding [] Cardiac Diabetic: [x]Yes []No     [x]Signed Treatment Consent Verified   [x] Time Out/ Safety Check   PRIMARY NURSE REPORT: FIRST INITIAL/ LAST NAME/TITLE  PRE DIALYSIS:             Yonatan Domínguez RN                              TIME: 2322   ACCESS   CATHETER ACCESS: [x] N/A  [] RIGHT  [] LEFT  [] IJ  [] SUBCL [] FEM                    [] First use X-ray  [] Tunnel     [] Non-Tunneled      [] No S/S infection  [] Redness [] Drainage  [] Cultured [] Swelling [] Pain [] Medical Aseptic [] Prep Dressing Changed                  [] Clotted [] Patent []      Flows: [] Good [] Poor [] Reversed                 If Access Problem Dr. Rudy Dash: [] Yes [] No    Date:_____  [] N/A[]   GRAFT/FISTULA ACCESS:  [] N/A  [] RIGHT  [x] LEFT  [x] UE   [] LE       [x] AVG  [] AVF [] BUTTONHOLE    [x] +BRUIT/THRILL [x] MEDICAL ASEPTIC PREP     [x] No S/S infection  [] Redness [] Drainage  [] Cultured [] Swelling [] Pain              If Access Problem Dr. Rudy Dash: [] Yes [] No    Date:______ [x] N/A   GENERAL ASSESSMENT   LUNGS:  SaO2% _100___ [x] Clear [] Coarse [] Crackles [] Wheezing               [] Diminished Location: [] RLL [] LLL [x] RUL [x] KYLAH    COUGH:  [] Productive  [] Loose[] Dry [x] N/A  RESPIRATIONS: [] Easy [] Labored    THERAPY: [x] RA   [] NC _____. L/min    Mask: [] NRB [] Venti  _____O2%                  [] Ventilator [] Intubated [] Trach [] BiPap [] CPap [] HI Flow   CARDIAC: [x] Regular [] Irregular [] Pericardial Rub [] JVD               Monitored Rhythm:______ [] N/A   EDEMA: [x] None [] Generalized [] Facial [] Pedal [] UE [] LE             [] Pitting [] 1 [] 2 [] 3 [] 4    [] Right [] Left [] Bilateral   SKIN:    [x] Warm [] Hot [] Cold  [x] Dry [] Pale [] Diaphoretic              [] Flushed [] Jaundiced [] Cyanotic [] Rash [] Weeping     LOC:    [x] Alert  Oriented to: [x] Person [x] Place [x] Time             [] Confused [] Lethargic [] Medicated [] Non-responsive    GI/ABDOMEN: [] Flat [] Distended [x] Soft [] Firm [] Diarrhea [x] Bowel Sounds Present [] Nausea [] Vomiting    PAIN: [x] 0 [] 1 [] 2 [] 3 [] 4 [] 5 [] 6 [] 7 [] 8 [] 9 [] 10          Scale 1-10 Action/Follow Up_____   MOBILITY: [] Amb [] Amb/Assist [x] Bed  [] Wheelchair    CURRENT LABS   HBsAg ONLY: Date Drawn:  08/27/2021           [x] Negative [] Positive [] Unknown.      HBsAb: Date Drawn:  08/27/2021            [x] Susceptible <10 [] Immune ?10 [] Unknown   Date of Current Labs:  ATTACHED EDUCATION   Person Educated: [x] Patient [] Other_________   Knowledge base: [] None [x] Minimal [] Substantial    Barriers to learning  [x] None _______________   Preferred method of learning: [] Written [x] Oral [x] Visual [] Hands on    Topic: [x] Access Care [x] S&S of infection [x] Fluid Management   [x] K+  [x] Procedural  [] Albumin [] Medications [] Tx Options   [] Transplant [] Diet [] Other    Teaching Tools: [] Explain [] Demonstration [] Handout_____ [] Video______  CARE PLAN    [x] Renal Failure (Adult)  Interdisciplinary  · Fluid and electrolytes stabilized  ? Interventions  · Dehydration signs and symptoms (eg: Weight/lab monitoring; vomiting/diarrhea/urine; tenting; mucous membranes; dizziness/lethargy/irritability/confusion; weak pulse; tachycardia; blood pressure; I&O)  · Fluid overload signs and symptoms assessment (eg: Body weight increased; dyspnea; edema; hypertension; respiratory crackles/wheezing; JVD; lab monitoring; mental status changes; I&O)  · Monitor appropriate lab values  · COMPLIANCE WITH PRESCRIBED THERAPY  · ARTERIAL ACCESS SITE ASSESSMENT  · NUTRITION SCREENING  · Vital signs monitoring per assessed patient condition or unit standard  · Cardiac monitoring  · Hydration management  · Intake and output measurement  · Body weight monitoring  · Skin care  · DIALYSIS  · Nutrition Care Process per nutrition screen  · Oral hygiene care every 2 hours  · Pain management     · Outcome   ? [x] Progressing Towards Goal  ? [] Not Progressing Towards Goals  ? [] Goals Met/Resolved  ? [] Goals Not Met/ Resolved        · Patient/ Family Education  ?  Progressing Towards Goals          RO/HEMODIAYLSIS MACHINE SAFETY CHECKS- BEFORE EACH TREATMENT          [] [facility]: Machine Serial #1:  [id number]   RO Serial #1: [id number]                       [x] [facility]: Machine Serial #2:  [id number]   RO Serial #2: [id number]        [] [facility]: Machine Serial #3:  [id number]   RO Serial #2:[id number]    Alarm Test: [x] Pass Time_1102_______  [x] RO/Machine Log Complete    [x] Extracorporeal circuit Tested for integrity           Dialyzer_[id number]_________   Tubing__21C04-12__________    Dialysate: pH 7.4___  Temp. _36___Conductivity: Meter ____ HD Machine___   CHLORINE TESTING- BEFORE EACH TREATMENT AND EVERY 4 HOURS   Total Chlorine: [x] Less than 0.1 ppm Time:__N/A___2nd Check Time:_N/A_____  (If greater than 0.1 ppm from Primary then every 30 minutes from Secondary)   TREATMENT INIATION-WITH DIALYSIS PRECAUTIONS   [x] All Connections Secured   [x] Saline Line Double Clamped    [x] Venous Parameters Set [x] Arterial Parameters Set    [x] Prime Given 250 ml     [x] Air Foam Detector Engaged   PRE-TREATMENT   UF Calculations: Wt to lose:_1500___ml(+) Oral:__ml(+)IV Meds/Fluids/Blood prods___ml(+) Prime/Rinse_500__ml(=)Total UF Goal_2000___mL   Scale Type:[x] Bed scale [] Sling Scale [] Wheel Chair Scale []  Not Ordered [] [] Unable to obtain pt on stretcher/ bed scale malfunctioning   Tx Initiation Note:  Received patient in bed semi fowlers position. Patient is A & O X 4.  Left upper arm AVG positive bruit and thrill present. No s/s of infection noted. Same prepped aseptically and cannulated x 2 #15 gauge needles. Treatment initiated as per MD order without incident. Plan to remove 1.5 liters for 3.75 hours while monitoring patient's well-being and vital signs.       [x] Time Out/Safety Check  Time:__1108___   INTRADIALYTIC MONITORING  (SEE ATTACHED FLOWSHEET)     POST TREATMENT    Time Medication Dose Volume Route    Initials   1212 Zemplar 4 mcg  IVP GM                                   [facility] Signatures Title Initials Time   Deepthi Elizondo RN GM                Dialyzer cleared: [x] Good [] Fair [] Poor     Blood Processed _93.2__Liters    Net UF Removed _461___mL  Post Tx Access:                  AVF/AVG: Bleeding Stop       Art._8__min Chaim.__8__min [x]+bruit/thrill                Catheter: Locking Solution [] Heparin 1 ml/1000 units  [] Normal Saline                                                                               Art._____ ml Chaim._____ml  Post Assessment:              Skin: [x]Warm []Dry []Diaphoretic []Flushed [] Pale []Cyanotic            Lungs: [x]Clear []Coarse []Crackles []Wheezing             Cardiac: [x]Regular []Irregular  []Monitored rhythm____ []N/A            Edema: [x]None []General []Facial []Pedal  []UE []LE []RIGHT []LEFT            Pain: [x]0 []1 []2 []3 []4 []5 []6 []7 []8 []9 []10   POST Tx Note:    HD treatment completed and tolerated well. Patient rinsed with 250 mL 0.9% N/S. Fistula needles removed, post bleeding WNL. Gauze/Tape dressing applied to puncture site. No active bleeding noted. Pt. remains in bed 343 in stable condition, freely breathing room air. No acute complaints or distress noted.          Primary Nurse Report: First initial/Last name/Title    Post Dialysis:____Nawaf Ramey RN   ______________________         Time:____1612____________    Abbreviations: AVG-arterial venous graft, AVF-arterial venous fistula, IJ-Internal Jugular,  Subcl-Subclavian, Fem-Femoral, Tx-treatment, AP/HR-apical heart rate, DFR-dialysate flow rate, BFR-blood flow rate, AP-arterial pressure, -venous pressure, UF-ultrafiltrate, TMP-transmembrane pressure, Chaim-Venous, Art-Arterial, RO-Reverse Osmosis

## 2021-09-04 NOTE — PROGRESS NOTES
Bedside and Verbal shift change report given to Zulema Domínguez RN (oncoming nurse) by Mague Lemos RN (offgoing nurse). Report included the following information SBAR, Kardex, ED Summary, Intake/Output, MAR, Recent Results, Med Rec Status and Cardiac Rhythm NSR.     2042  Shift assessment complete  Pt resting quietly with eyes open and chest rising and falling evenly   Bed locked and In lowest position bed  Call bell within [address] Cardiac/Medical Night Shift Chart Audit    Chart Audit completed? YES      Shift uneventful     Bedside and Verbal shift change report given to Zoraida Urbina RN (oncoming nurse) by Zulema Domínguez RN (offgoing nurse). Report included the following information SBAR, Kardex, ED Summary, Intake/Output, MAR, Recent Results, Med Rec Status and Cardiac Rhythm NSR.

## 2021-09-04 NOTE — PROGRESS NOTES
Hospitalist Progress Note    Patient: Adam Troy. MRN: [id number]  CSN: [id number]    YOB: 1970  Age: 54 y.o. Sex: male    DOA: 8/26/2021 LOS:  LOS: 8 days            Assessment/Plan   1. Metabolic encephalopathy- resolved. A+Ox3 this AM  2. Diarrhea- overnight RN notes  No BM overnight. Pt reports 1 loose stool Cdiff neg. Enteric pathogens neg  3. 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 ESRD on HD  5. H/ o BKA  6. ESBL in wound- on contact isolation  7. Chronic LV systolic CHF EF 79%= compensated  8. Hyperkalemia- resoled  9. Cerebellar CVA- old  8. HTN    Plan:  - currently being monitored off abx, ID following. Will need bone biopsy 10-14 days to guide ttreatment.  No evidence of systemic infection/ sepsis currently   - monitor diarrhea for today, ask for RN to document BMs given conflicting history  - continue novrsc, coreg, cozaar, ASA  - DC PPI  - mobilize w PT  - may need GI eval for diarrhea if persists    Patient Active Problem List   Diagnosis Code    Hyponatremia E87.1    Acute on chronic renal insufficiency N28.9, N18.9    Iron deficiency anemia D50.9    Other restrictive cardiomyopathy ([address] I42.5    ESRD (end stage renal disease) on dialysis ([address] N18.6, Z99.2    Cellulitis and abscess of foot L03.119, L02.619    HTN (hypertension) I10    Hx of BKA, right ([address] Z89.511    Hypoglycemia E16.2    Hypertensive urgency I16.0    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    Hyperkalemia E87.5    Hypertensive crisis I16.9    Uremia N19    Hypoglycemia associated with diabetes ([address] E11.649    Non-compliance with renal dialysis ([address] Z91.15    Toxic metabolic encephalopathy F37               Subjective:    cc: \" Im starting to feel better\"  Pt alert and oriented this morning  Overnight RN notes no BM, he reporsts 1 loose stool  Didn't participate in PT yesterday  Afebrile, no nausea, vomiting  Ordered breakfast, yet to eat        REVIEW OF SYSTEMS:  General: No fevers or chills. Cardiovascular: No chest pain or pressure. No palpitations. Pulmonary: No shortness of breath. Gastrointestinal: No nausea, vomiting. Objective:        Vital signs/Intake and Output:  Visit Vitals  BP (!) 114/56   Pulse 87   Temp 97.3 °F (36.3 °C)   Resp 20   Ht 5' 7\" (1.702 m)   Wt 76.2 kg (167 lb 15.9 oz)   SpO2 100%   BMI 26.31 kg/m²     Current Shift:  No intake/output data recorded. Last three shifts:  09/02 1901 - 09/04 0700  In: -   Out: 300 [Urine:300]    Body mass index is 26.31 kg/m².     Physical Exam:  GEN: Alert and oriented times three NAD, appears older than stated age  EYES: conjunctiva normal, lids with out lesions  HEENT: MMM, No thyromegaly, no lymphadenopathy  HEART: RRR +S1 +S2, no JVD, pulses 2+ distally  LUNGS: CTA B/L no wheezes, rales or rhonchi  ABDOMEN: + BS, soft NT/ND no organomegaly,  No rebound  EXTREMITIES: No edema cyanosis, cap refill normal heal bandaged cdi, no erythema + BKA   SKIN:  no nodules, normal turgor  Current Facility-Administered Medications   Medication Dose Route Frequency    epoetin delonte-epbx (RETACRIT) injection 3,000 Units  3,000 Units IntraVENous Q TUE, THU & SAT    insulin lispro (HUMALOG) injection   SubCUTAneous AC&HS    LORazepam (ATIVAN) tablet 1 mg  1 mg Oral Q4H PRN    LORazepam (ATIVAN) injection 1 mg  1 mg IntraVENous Q4H PRN    losartan (COZAAR) tablet 50 mg  50 mg Oral DAILY    diphenhydrAMINE (BENADRYL) injection 12.5 mg  12.5 mg IntraVENous Q6H PRN    thiamine (B-1) injection 100 mg  100 mg IntraMUSCular DAILY    labetaloL (NORMODYNE;TRANDATE) 20 mg/4 mL (5 mg/mL) injection 10 mg  10 mg IntraVENous Q6H PRN    hydrALAZINE (APRESOLINE) 20 mg/mL injection 20 mg  20 mg IntraVENous Q6H PRN    glucose chewable tablet 16 g  4 Tablet Oral PRN    glucagon (GLUCAGEN) injection 1 mg  1 mg IntraMUSCular PRN    dextrose (D50W) injection syrg 12.5-25 g  25-50 mL IntraVENous PRN    aspirin delayed-release tablet 81 mg  81 mg Oral DAILY    paricalcitoL (ZEMPLAR) injection 4 mcg  4 mcg IntraVENous DIALYSIS MON, WED & FRI    acetaminophen (TYLENOL) tablet 650 mg  650 mg Oral Q6H PRN    Or    acetaminophen (TYLENOL) suppository 650 mg  650 mg Rectal Q6H PRN    polyethylene glycol (MIRALAX) packet 17 g  17 g Oral DAILY PRN    heparin (porcine) injection 5,000 Units  5,000 Units SubCUTAneous Q8H    promethazine (PHENERGAN) tablet 12.5 mg  12.5 mg Oral Q6H PRN    Or    ondansetron (ZOFRAN) injection 4 mg  4 mg IntraVENous Q6H PRN    pantoprazole (PROTONIX) injection 40 mg  40 mg IntraVENous Q24H    amLODIPine (NORVASC) tablet 10 mg  10 mg Oral DAILY    carvediloL (COREG) tablet 25 mg  25 mg Oral BID WITH MEALS    isosorbide mononitrate ER (IMDUR) tablet 60 mg  60 mg Oral DAILY    sevelamer carbonate (RENVELA) tab 1,600 mg  1,600 mg Oral TID WITH MEALS    heparin (porcine) 1,000 unit/mL injection 2,000 Units  2,000 Units Hemodialysis DIALYSIS PRN         All the patient's labs over the past 24 hours were reviewed both during my initial daily workflow process and at the time notated as \"note time\" in [facility]. (It is not time stamped separately in this workflow.)  Select labs are listed below.         Labs: Results:       Chemistry Recent Labs     09/04/21  0405 09/03/21  0740   * 76    136   K 4.9 4.5    101   CO2 28 26   BUN 45* 29*   CREA 9.03* 7.20*   CA 7.8* 8.2*   AGAP 8 9   BUCR 5* 4*      CBC w/Diff Recent Labs     09/04/21  0405 09/03/21  0740   WBC 5.7 5.7   RBC 3.88* 4.03*   HGB 10.1* 10.4*   HCT 30.1* 31.7*    156   GRANS 58 58   LYMPH 19* 17*   EOS 6* 5              Lipid Panel Lab Results   Component Value Date/Time    Cholesterol, total 146 08/30/2021 02:50 AM    HDL Cholesterol 45 08/30/2021 02:50 AM    LDL, calculated 81 08/30/2021 02:50 AM    VLDL, calculated 20 08/30/2021 02:50 AM    Triglyceride 100 08/30/2021 02:50 AM    CHOL/HDL Ratio 3.2 08/30/2021 02:50 AM              Thyroid Studies Lab Results   Component Value Date/Time    TSH 2.38 08/29/2021 01:20 AM        Procedures/imaging: see electronic medical records for all procedures/Xrays and details which were not copied into this note but were reviewed prior to creation of Nicho [address], DO  Internal Medicine/Geriatrics

## 2021-09-04 NOTE — ROUTINE PROCESS
Verbal shift change report given to Melissa Wang RN by Velia Reese RN. Report included the following information SBAR, Kardex, Summary, Intake/Output and MAR.

## 2021-09-05 LAB
ANION GAP SERPL CALC-SCNC: 6 MMOL/L (ref 3–18)
BASOPHILS # BLD: 0 K/UL (ref 0–0.1)
BASOPHILS NFR BLD: 1 % (ref 0–2)
BUN SERPL-MCNC: 26 MG/DL (ref 7–18)
BUN/CREAT SERPL: 5 (ref 12–20)
CALCIUM SERPL-MCNC: 8.1 MG/DL (ref 8.5–10.1)
CHLORIDE SERPL-SCNC: 104 MMOL/L (ref 100–111)
CO2 SERPL-SCNC: 29 MMOL/L (ref 21–32)
CREAT SERPL-MCNC: 5.71 MG/DL (ref 0.6–1.3)
DIFFERENTIAL METHOD BLD: ABNORMAL
EOSINOPHIL # BLD: 0.3 K/UL (ref 0–0.4)
EOSINOPHIL NFR BLD: 5 % (ref 0–5)
ERYTHROCYTE [DISTWIDTH] IN BLOOD BY AUTOMATED COUNT: 21 % (ref 11.6–14.5)
GLUCOSE BLD STRIP.AUTO-MCNC: 101 MG/DL (ref 70–110)
GLUCOSE BLD STRIP.AUTO-MCNC: 105 MG/DL (ref 70–110)
GLUCOSE BLD STRIP.AUTO-MCNC: 109 MG/DL (ref 70–110)
GLUCOSE SERPL-MCNC: 116 MG/DL (ref 74–99)
HCT VFR BLD AUTO: 31.9 % (ref 36–48)
HGB BLD-MCNC: 10.3 G/DL (ref 13–16)
LYMPHOCYTES # BLD: 1 K/UL (ref 0.9–3.6)
LYMPHOCYTES NFR BLD: 19 % (ref 21–52)
MAGNESIUM SERPL-MCNC: 2.4 MG/DL (ref 1.6–2.6)
MCH RBC QN AUTO: 25.5 PG (ref 24–34)
MCHC RBC AUTO-ENTMCNC: 32.3 G/DL (ref 31–37)
MCV RBC AUTO: 79 FL (ref 78–100)
MONOCYTES # BLD: 0.8 K/UL (ref 0.05–1.2)
MONOCYTES NFR BLD: 16 % (ref 3–10)
NEUTS SEG # BLD: 3.2 K/UL (ref 1.8–8)
NEUTS SEG NFR BLD: 60 % (ref 40–73)
PHOSPHATE SERPL-MCNC: 4.1 MG/DL (ref 2.5–4.9)
PLATELET # BLD AUTO: 162 K/UL (ref 135–420)
POTASSIUM SERPL-SCNC: 4 MMOL/L (ref 3.5–5.5)
RBC # BLD AUTO: 4.04 M/UL (ref 4.35–5.65)
SODIUM SERPL-SCNC: 139 MMOL/L (ref 136–145)
WBC # BLD AUTO: 5.3 K/UL (ref 4.6–13.2)

## 2021-09-05 PROCEDURE — 82962 GLUCOSE BLOOD TEST: CPT

## 2021-09-05 PROCEDURE — 80048 BASIC METABOLIC PNL TOTAL CA: CPT

## 2021-09-05 PROCEDURE — 65660000000 HC RM CCU STEPDOWN

## 2021-09-05 PROCEDURE — 74011250636 HC RX REV CODE- 250/636: Performed by: INTERNAL MEDICINE

## 2021-09-05 PROCEDURE — 97162 PT EVAL MOD COMPLEX 30 MIN: CPT

## 2021-09-05 PROCEDURE — 74011250637 HC RX REV CODE- 250/637: Performed by: STUDENT IN AN ORGANIZED HEALTH CARE EDUCATION/TRAINING PROGRAM

## 2021-09-05 PROCEDURE — 36415 COLL VENOUS BLD VENIPUNCTURE: CPT

## 2021-09-05 PROCEDURE — 85025 COMPLETE CBC W/AUTO DIFF WBC: CPT

## 2021-09-05 PROCEDURE — 84100 ASSAY OF PHOSPHORUS: CPT

## 2021-09-05 PROCEDURE — 74011250637 HC RX REV CODE- 250/637: Performed by: INTERNAL MEDICINE

## 2021-09-05 PROCEDURE — 83735 ASSAY OF MAGNESIUM: CPT

## 2021-09-05 PROCEDURE — 97116 GAIT TRAINING THERAPY: CPT

## 2021-09-05 PROCEDURE — 74011250637 HC RX REV CODE- 250/637: Performed by: PSYCHIATRY & NEUROLOGY

## 2021-09-05 RX ADMIN — ASPIRIN 81 MG: 81 TABLET, COATED ORAL at 09:51

## 2021-09-05 RX ADMIN — SEVELAMER CARBONATE 1600 MG: 800 TABLET, FILM COATED ORAL at 18:46

## 2021-09-05 RX ADMIN — HEPARIN SODIUM 5000 UNITS: 5000 INJECTION INTRAVENOUS; SUBCUTANEOUS at 22:30

## 2021-09-05 RX ADMIN — CARVEDILOL 25 MG: 12.5 TABLET, FILM COATED ORAL at 18:46

## 2021-09-05 RX ADMIN — HEPARIN SODIUM 5000 UNITS: 5000 INJECTION INTRAVENOUS; SUBCUTANEOUS at 06:38

## 2021-09-05 NOTE — PROGRESS NOTES
Hospitalist Progress Note    Patient: Vickey Oconnor. MRN: [id number]  CSN: [id number]    YOB: 1970  Age: 54 y.o. Sex: male    DOA: 8/26/2021 LOS:  LOS: 9 days            Assessment/Plan   1. Metabolic encephalopathy- resolved. 2. Diarrhea- resolved. Cdiff neg. Enteric pathogens neg  3. 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 ESRD on HD  5. H/ o BKA  6. ESBL in wound- on contact isolation  7. Chronic LV systolic CHF EF 53%= compensated  8. Hyperkalemia- resoled  9. Cerebellar CVA- old  8. HTN    Plan:  - continue to monitor off abx  - HD per nephrology  - needs biopsy of calcaneous which can be done as out pt  - cont antihypertensives  - wound care  - needs to work w PT. If cleared can potentially DC tomorrow      Patient Active Problem List   Diagnosis Code    Hyponatremia E87.1    Acute on chronic renal insufficiency N28.9, N18.9    Iron deficiency anemia D50.9    Other restrictive cardiomyopathy ([facility] [address] I42.5    ESRD (end stage renal disease) on dialysis ([facility] [address] N18.6, Z99.2    Cellulitis and abscess of foot L03.119, L02.619    HTN (hypertension) I10    Hx of BKA, right ([address] Z89.511    Hypoglycemia E16.2    Hypertensive urgency I16.0    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    Hyperkalemia E87.5    Hypertensive crisis I16.9    Uremia N19    Hypoglycemia associated with diabetes ([facility] [address] E11.649    Non-compliance with renal dialysis ([facility] [address] Z91.15    Toxic metabolic encephalopathy J45               Subjective:    cc: \" I have gas\"  Diarrhea resolved  No acute events overnight        REVIEW OF SYSTEMS:  General: No fevers or chills. Cardiovascular: No chest pain or pressure. No palpitations. Pulmonary: No shortness of breath. Gastrointestinal: No nausea, vomiting.      Objective:        Vital signs/Intake and Output:  Visit Vitals  BP (!) 114/59 (BP 1 Location: Right upper arm, BP Patient Position: At rest)   Pulse 98   Temp 98.9 °F (37.2 °C)   Resp 16   Ht 5' 7\" (1.702 m)   Wt 77 kg (169 lb 11.2 oz)   SpO2 99%   BMI 26.58 kg/m²     Current Shift:  No intake/output data recorded. Last three shifts:  09/03 0701 - 09/04 1900  In: 680 [P.O.:680]  Out: 461     Body mass index is 26.58 kg/m².     Physical Exam:  GEN: Alert and oriented times three NAD  EYES: conjunctiva normal, lids with out lesions  HEENT: MMM, No thyromegaly, no lymphadenopathy  HEART: RRR +S1 +S2, no JVD, pulses 2+ distally  LUNGS: CTA B/L no wheezes, rales or rhonchi  ABDOMEN: + BS, soft NT/ND no organomegaly,  No rebound  EXTREMITIES: No edema cyanosis, cap refill normal, BKA, + bandage left heel    SKIN: no rashes or skin breakdown, no nodules, normal turgor  Current Facility-Administered Medications   Medication Dose Route Frequency    paricalcitoL (ZEMPLAR) injection 4 mcg  4 mcg IntraVENous DIALYSIS TUE, THU & SAT    epoetin delonte-epbx (RETACRIT) injection 3,000 Units  3,000 Units IntraVENous Q TUE, THU & SAT    insulin lispro (HUMALOG) injection   SubCUTAneous AC&HS    LORazepam (ATIVAN) tablet 1 mg  1 mg Oral Q4H PRN    LORazepam (ATIVAN) injection 1 mg  1 mg IntraVENous Q4H PRN    losartan (COZAAR) tablet 50 mg  50 mg Oral DAILY    diphenhydrAMINE (BENADRYL) injection 12.5 mg  12.5 mg IntraVENous Q6H PRN    thiamine (B-1) injection 100 mg  100 mg IntraMUSCular DAILY    labetaloL (NORMODYNE;TRANDATE) 20 mg/4 mL (5 mg/mL) injection 10 mg  10 mg IntraVENous Q6H PRN    hydrALAZINE (APRESOLINE) 20 mg/mL injection 20 mg  20 mg IntraVENous Q6H PRN    glucose chewable tablet 16 g  4 Tablet Oral PRN    glucagon (GLUCAGEN) injection 1 mg  1 mg IntraMUSCular PRN    dextrose (D50W) injection syrg 12.5-25 g  25-50 mL IntraVENous PRN    aspirin delayed-release tablet 81 mg  81 mg Oral DAILY    acetaminophen (TYLENOL) tablet 650 mg  650 mg Oral Q6H PRN    Or    acetaminophen (TYLENOL) suppository 650 mg  650 mg Rectal Q6H PRN    polyethylene glycol (MIRALAX) packet 17 g 17 g Oral DAILY PRN    heparin (porcine) injection 5,000 Units  5,000 Units SubCUTAneous Q8H    promethazine (PHENERGAN) tablet 12.5 mg  12.5 mg Oral Q6H PRN    Or    ondansetron (ZOFRAN) injection 4 mg  4 mg IntraVENous Q6H PRN    amLODIPine (NORVASC) tablet 10 mg  10 mg Oral DAILY    carvediloL (COREG) tablet 25 mg  25 mg Oral BID WITH MEALS    isosorbide mononitrate ER (IMDUR) tablet 60 mg  60 mg Oral DAILY    sevelamer carbonate (RENVELA) tab 1,600 mg  1,600 mg Oral TID WITH MEALS    heparin (porcine) 1,000 unit/mL injection 2,000 Units  2,000 Units Hemodialysis DIALYSIS PRN         All the patient's labs over the past 24 hours were reviewed both during my initial daily workflow process and at the time notated as \"note time\" in [facility]. (It is not time stamped separately in this workflow.)  Select labs are listed below.         Labs: Results:       Chemistry Recent Labs     09/05/21 0420 09/04/21  0405 09/03/21  0740   * 102* 76    136 136   K 4.0 4.9 4.5    100 101   CO2 29 28 26   BUN 26* 45* 29*   CREA 5.71* 9.03* 7.20*   CA 8.1* 7.8* 8.2*   AGAP 6 8 9   BUCR 5* 5* 4*      CBC w/Diff Recent Labs     09/05/21 0420 09/04/21  0405 09/03/21  0740   WBC 5.3 5.7 5.7   RBC 4.04* 3.88* 4.03*   HGB 10.3* 10.1* 10.4*   HCT 31.9* 30.1* 31.7*    168 156   GRANS 60 58 58   LYMPH 19* 19* 17*   EOS 5 6* 5              Lipid Panel Lab Results   Component Value Date/Time    Cholesterol, total 146 08/30/2021 02:50 AM    HDL Cholesterol 45 08/30/2021 02:50 AM    LDL, calculated 81 08/30/2021 02:50 AM    VLDL, calculated 20 08/30/2021 02:50 AM    Triglyceride 100 08/30/2021 02:50 AM    CHOL/HDL Ratio 3.2 08/30/2021 02:50 AM              Thyroid Studies Lab Results   Component Value Date/Time    TSH 2.38 08/29/2021 01:20 AM        Procedures/imaging: see electronic medical records for all procedures/Xrays and details which were not copied into this note but were reviewed prior to creation katelyn [address], DO  Internal Medicine/Geriatrics

## 2021-09-05 NOTE — PROGRESS NOTES
Bedside and Verbal shift change report given to Ernie Godinez RN (oncoming nurse) by Yonatan Domínguez RN (offgoing nurse). Report included the following information SBAR, Kardex, ED Summary, Intake/Output, MAR, Recent Results, Med Rec Status and Cardiac Rhythm NSR.     1926  Shift assessment complete  Pt resting quietly with eyes closed and chest rising and falling evenly   No c/o pain or signs of discomfort   Bed locked and in lowest position   Call bell within reach       Shift uneventful   3 [place] Cardiac/Medical Night Shift Chart Audit    Chart Audit completed? YES          Bedside and Verbal shift change report given to Andrews Hinton RN (oncoming nurse) by Ernie Godinez RN (offgoing nurse). Report included the following information SBAR, Kardex, ED Summary, Intake/Output, MAR, Recent Results, Med Rec Status and Cardiac Rhythm NSR.

## 2021-09-05 NOTE — PROGRESS NOTES
Physical Therapy Evaluation/Treatment Attempt     Chart reviewed. Attempted Physical Therapy Evaluation, however, patient unable to be seen due to:  []  Nausea/vomiting  []  Eating  []  Pain  []  Patient too lethargic  []  Off Unit for testing/procedure  []  Dialysis treatment in progress   []  Telemetry Results  [x]  7623 & 8841 Other: Pt on bed pan, requesting additional time. Will follow up later as patient's schedule allows.    Thank you for this referral.    Hattie Lee, PT, DPT

## 2021-09-05 NOTE — PROGRESS NOTES
4975 : assumed care of patient from [address] [facility] [address]    5411 : Bedside shift change report given to Judy (oncoming nurse) by Chasity Plummer RN (offgoing nurse). Report included the following information SBAR, Kardex, Intake/Output and MAR.

## 2021-09-05 NOTE — PROGRESS NOTES
Assessment:     ESRD  HTN  Anemia  Non complinace  BKA  CHF  DM        Plan:   HD  MWF  Retacrit  Calcitriol    Educated pt regarding importance of getting 12 hr dialysis a week. CC: ESRD  Interval History: no overnight events        Subjective:   PT is back to his baseline mental status                     Blood pressure (!) 149/81, pulse 95, temperature 98.4 °F (36.9 °C), resp. rate 16, height 5' 7\" (1.702 m), weight 77 kg (169 lb 11.2 oz), SpO2 100 %.       awake and alert   NAD      Intake/Output Summary (Last 24 hours) at 9/5/2021 1418  Last data filed at 9/5/2021 0928  Gross per 24 hour   Intake 440 ml   Output 461 ml   Net -21 ml      Recent Labs     09/05/21  0420   WBC 5.3     Lab Results   Component Value Date/Time    Sodium 139 09/05/2021 04:20 AM    Potassium 4.0 09/05/2021 04:20 AM    Chloride 104 09/05/2021 04:20 AM    CO2 29 09/05/2021 04:20 AM    Anion gap 6 09/05/2021 04:20 AM    Glucose 116 (H) 09/05/2021 04:20 AM    BUN 26 (H) 09/05/2021 04:20 AM    Creatinine 5.71 (H) 09/05/2021 04:20 AM    BUN/Creatinine ratio 5 (L) 09/05/2021 04:20 AM    GFR est AA 13 (L) 09/05/2021 04:20 AM    GFR est non-AA 11 (L) 09/05/2021 04:20 AM    Calcium 8.1 (L) 09/05/2021 04:20 AM        Current Facility-Administered Medications   Medication Dose Route Frequency Provider Last Rate Last Admin    paricalcitoL (ZEMPLAR) injection 4 mcg  4 mcg IntraVENous DIALYSIS TUE, THU & SAT Christina Ivon, DO   4 mcg at 09/04/21 1212    epoetin delonte-epbx (RETACRIT) injection 3,000 Units  3,000 Units IntraVENous Q TUE, THU & SAT Yanet Landry R, DO   3,000 Units at 09/04/21 2207    insulin lispro (HUMALOG) injection   SubCUTAneous AC&HS Celia Watson MD   2 Units at 09/04/21 1728    LORazepam (ATIVAN) tablet 1 mg  1 mg Oral Q4H PRN Marjan Morton MD        LORazepam (ATIVAN) injection 1 mg  1 mg IntraVENous Q4H PRN Marjan Morton MD   1 mg at 09/02/21 1336    losartan (COZAAR) tablet 50 mg  50 mg Oral DAILY Alecia, MD José   50 mg at 09/04/21 0923    diphenhydrAMINE (BENADRYL) injection 12.5 mg  12.5 mg IntraVENous Q6H PRN Zeferino Foster MD   12.5 mg at 09/01/21 7175    thiamine (B-1) injection 100 mg  100 mg IntraMUSCular DAILY José Alston MD   100 mg at 09/04/21 0925    labetaloL (NORMODYNE;TRANDATE) 20 mg/4 mL (5 mg/mL) injection 10 mg  10 mg IntraVENous Q6H PRN Deepali Jara MD        hydrALAZINE (APRESOLINE) 20 mg/mL injection 20 mg  20 mg IntraVENous Q6H PRN José Alston MD        glucose chewable tablet 16 g  4 Tablet Oral PRN Zeferino Foster MD        glucagon (GLUCAGEN) injection 1 mg  1 mg IntraMUSCular PRN Zeferino Foster MD        dextrose (D50W) injection syrg 12.5-25 g  25-50 mL IntraVENous PRN Zeferino Foster MD   12.5 g at 08/31/21 0914    aspirin delayed-release tablet 81 mg  81 mg Oral DAILY Elyssa Morgan MD   81 mg at 09/05/21 4948    acetaminophen (TYLENOL) tablet 650 mg  650 mg Oral Q6H PRN Chandan Watson MD   650 mg at 08/28/21 0242    Or    acetaminophen (TYLENOL) suppository 650 mg  650 mg Rectal Q6H PRN Chandan Watson MD        polyethylene glycol (MIRALAX) packet 17 g  17 g Oral DAILY PRN Chandan Watson MD        heparin (porcine) injection 5,000 Units  5,000 Units SubCUTAneous Q8H Chandan Watson MD   5,000 Units at 09/05/21 3761    promethazine (PHENERGAN) tablet 12.5 mg  12.5 mg Oral Q6H PRN Chandan Watson MD        Or    ondansetron (ZOFRAN) injection 4 mg  4 mg IntraVENous Q6H PRN Chandan Watson MD   4 mg at 09/01/21 0435    amLODIPine (NORVASC) tablet 10 mg  10 mg Oral DAILY Estela Almazan MD   10 mg at 09/04/21 3620    carvediloL (COREG) tablet 25 mg  25 mg Oral BID WITH MEALS Estela Almazan MD   25 mg at 09/04/21 1729    isosorbide mononitrate ER (IMDUR) tablet 60 mg  60 mg Oral DAILY Estela Almazan MD   60 mg at 09/04/21 0924    sevelamer carbonate (RENVELA) tab 1,600 mg  1,600 mg Oral TID WITH MEALS José Alston MD   1,600 mg at 09/04/21 1730    heparin (porcine) 1,000 unit/mL injection 2,000 Units  2,000 Units Hemodialysis DIALYSIS PRN Ofelia Yadav MD

## 2021-09-06 ENCOUNTER — HOME HEALTH ADMISSION (OUTPATIENT)
Dept: HOME HEALTH SERVICES | Facility: HOME HEALTH | Age: 51
End: 2021-09-06
Payer: MEDICARE

## 2021-09-06 VITALS
BODY MASS INDEX: 26.64 KG/M2 | HEIGHT: 67 IN | TEMPERATURE: 98.4 F | OXYGEN SATURATION: 100 % | WEIGHT: 169.75 LBS | HEART RATE: 88 BPM | DIASTOLIC BLOOD PRESSURE: 69 MMHG | RESPIRATION RATE: 18 BRPM | SYSTOLIC BLOOD PRESSURE: 146 MMHG

## 2021-09-06 LAB
ANION GAP SERPL CALC-SCNC: 8 MMOL/L (ref 3–18)
BASOPHILS # BLD: 0 K/UL (ref 0–0.1)
BASOPHILS NFR BLD: 1 % (ref 0–2)
BUN SERPL-MCNC: 46 MG/DL (ref 7–18)
BUN/CREAT SERPL: 6 (ref 12–20)
CALCIUM SERPL-MCNC: 8.2 MG/DL (ref 8.5–10.1)
CHLORIDE SERPL-SCNC: 103 MMOL/L (ref 100–111)
CO2 SERPL-SCNC: 28 MMOL/L (ref 21–32)
CREAT SERPL-MCNC: 7.97 MG/DL (ref 0.6–1.3)
DIFFERENTIAL METHOD BLD: ABNORMAL
EOSINOPHIL # BLD: 0.2 K/UL (ref 0–0.4)
EOSINOPHIL NFR BLD: 4 % (ref 0–5)
ERYTHROCYTE [DISTWIDTH] IN BLOOD BY AUTOMATED COUNT: 20.6 % (ref 11.6–14.5)
GLUCOSE BLD STRIP.AUTO-MCNC: 112 MG/DL (ref 70–110)
GLUCOSE SERPL-MCNC: 95 MG/DL (ref 74–99)
HCT VFR BLD AUTO: 31.6 % (ref 36–48)
HGB BLD-MCNC: 10.1 G/DL (ref 13–16)
LYMPHOCYTES # BLD: 0.9 K/UL (ref 0.9–3.6)
LYMPHOCYTES NFR BLD: 18 % (ref 21–52)
MAGNESIUM SERPL-MCNC: 2.7 MG/DL (ref 1.6–2.6)
MCH RBC QN AUTO: 25.3 PG (ref 24–34)
MCHC RBC AUTO-ENTMCNC: 32 G/DL (ref 31–37)
MCV RBC AUTO: 79 FL (ref 78–100)
MONOCYTES # BLD: 1 K/UL (ref 0.05–1.2)
MONOCYTES NFR BLD: 20 % (ref 3–10)
NEUTS SEG # BLD: 2.9 K/UL (ref 1.8–8)
NEUTS SEG NFR BLD: 57 % (ref 40–73)
PHOSPHATE SERPL-MCNC: 5.9 MG/DL (ref 2.5–4.9)
PLATELET # BLD AUTO: 159 K/UL (ref 135–420)
PMV BLD AUTO: 9.4 FL (ref 9.2–11.8)
POTASSIUM SERPL-SCNC: 4.5 MMOL/L (ref 3.5–5.5)
RBC # BLD AUTO: 4 M/UL (ref 4.35–5.65)
SODIUM SERPL-SCNC: 139 MMOL/L (ref 136–145)
WBC # BLD AUTO: 5.1 K/UL (ref 4.6–13.2)

## 2021-09-06 PROCEDURE — 74011250637 HC RX REV CODE- 250/637: Performed by: INTERNAL MEDICINE

## 2021-09-06 PROCEDURE — 74011250637 HC RX REV CODE- 250/637: Performed by: PSYCHIATRY & NEUROLOGY

## 2021-09-06 PROCEDURE — 82962 GLUCOSE BLOOD TEST: CPT

## 2021-09-06 PROCEDURE — 80048 BASIC METABOLIC PNL TOTAL CA: CPT

## 2021-09-06 PROCEDURE — 83735 ASSAY OF MAGNESIUM: CPT

## 2021-09-06 PROCEDURE — 74011250637 HC RX REV CODE- 250/637: Performed by: STUDENT IN AN ORGANIZED HEALTH CARE EDUCATION/TRAINING PROGRAM

## 2021-09-06 PROCEDURE — 74011250636 HC RX REV CODE- 250/636: Performed by: STUDENT IN AN ORGANIZED HEALTH CARE EDUCATION/TRAINING PROGRAM

## 2021-09-06 PROCEDURE — 84100 ASSAY OF PHOSPHORUS: CPT

## 2021-09-06 PROCEDURE — 74011250636 HC RX REV CODE- 250/636: Performed by: INTERNAL MEDICINE

## 2021-09-06 PROCEDURE — 85025 COMPLETE CBC W/AUTO DIFF WBC: CPT

## 2021-09-06 PROCEDURE — 90935 HEMODIALYSIS ONE EVALUATION: CPT

## 2021-09-06 PROCEDURE — 36415 COLL VENOUS BLD VENIPUNCTURE: CPT

## 2021-09-06 RX ORDER — ASPIRIN 81 MG/1
81 TABLET ORAL DAILY
Qty: 30 TABLET | Refills: 0 | Status: SHIPPED | OUTPATIENT
Start: 2021-09-07

## 2021-09-06 RX ADMIN — HEPARIN SODIUM 5000 UNITS: 5000 INJECTION INTRAVENOUS; SUBCUTANEOUS at 14:52

## 2021-09-06 RX ADMIN — LOSARTAN POTASSIUM 50 MG: 50 TABLET, FILM COATED ORAL at 09:45

## 2021-09-06 RX ADMIN — CARVEDILOL 25 MG: 12.5 TABLET, FILM COATED ORAL at 09:45

## 2021-09-06 RX ADMIN — HEPARIN SODIUM 5000 UNITS: 5000 INJECTION INTRAVENOUS; SUBCUTANEOUS at 06:10

## 2021-09-06 RX ADMIN — THIAMINE HYDROCHLORIDE 100 MG: 100 INJECTION, SOLUTION INTRAMUSCULAR; INTRAVENOUS at 14:53

## 2021-09-06 RX ADMIN — ASPIRIN 81 MG: 81 TABLET, COATED ORAL at 09:45

## 2021-09-06 RX ADMIN — ISOSORBIDE MONONITRATE 60 MG: 60 TABLET, EXTENDED RELEASE ORAL at 09:45

## 2021-09-06 RX ADMIN — AMLODIPINE BESYLATE 10 MG: 5 TABLET ORAL at 09:45

## 2021-09-06 NOTE — PROGRESS NOTES
D/C Plan: [address] and physician follow up     CM has been notified pt is to be discharged today. CM spoke with pt to discuss transition of care and HH. Pt is agreeable to [place] and would like to use [facility]. CMS has been notified to assit. Pt has indicated he will need assistance with transport home today following dialysis. Pt is unable to ambulate to his home and will need medical transport arranged after 2:30pm.   CM has notified staff to assist with arranging transport. Anticipate pt will transition home today with [facility] and physician follow up. No other transition of care needs have been identified. Care Management Interventions  PCP Verified by CM: Yes  Palliative Care Criteria Met (RRAT>21 & CHF Dx)?: No  Mode of Transport at Discharge: S  Transition of Care Consult (CM Consult): Discharge Planning, [address]: Yes  Health Maintenance Reviewed: Yes  Current Support Network: Lives Alone  Confirm Follow Up Transport: Other (see comment)  The Plan for Transition of Care is Related to the Following Treatment Goals :  [facility] and physician follow up   The Patient and/or Patient Representative was Provided with a Choice of Provider and Agrees with the Discharge Plan?: Yes  Name of the Patient Representative Who was Provided with a Choice of Provider and Agrees with the Discharge Plan: pt  Freedom of Choice List was Provided with Basic Dialogue that Supports the Patient's Individualized Plan of Care/Goals, Treatment Preferences and Shares the Quality Data Associated with the Providers?: Yes  Discharge Location  Discharge Placement: Home with home health ([address] and physician follow up )

## 2021-09-06 NOTE — DIALYSIS
TREATMENT SUMMARY     Patient dialyzed in room ICU 6  Tolerated tx with hypotension requiring 250 NS bolus and reduced UF goal.  LUE AVF functioning well without complication of BFR or accessing       1000 ml removed via UF with a with a net removal 250 ml  Report given to with all questions answered Storm Leger RN     TREATMENT NOTES                                                                                                ACUTE HEMODIALYSIS FLOW SHEET       PATIENT INFORMATION   [address]    []1st Time Acute  []Stat[x] Routine []Urgent []Chronic Unit   []Acute Room [x]Bedside  []ICU/CCU []ER   Isolation Precautions: []Dialysis[] Airborne [x]Contact []Droplet []Reverse    Special Considerations:_______  [] Blood Consent Verified  []N/A   Allergies:[] NKA  Allergies      Penicillins Penicillins Rash Not Specified  12/22/2020    Deletion Reason:    Vancomycin Vancomycin Other (comments) Not Specified  1/29/2020    kayla syndrome    Code Status [x]Full Code [] DNR  [] Other_____   Diet: [] Renal [] NPO [x] Diabetic   [] Enteral Feeding [] Cardiac Diabetic: [x]Yes []No     [x]Signed Treatment Consent Verified   [x] Time Out/ Safety Check   PRIMARY NURSE REPORT: FIRST INITIAL/ LAST NAME/TITLE  PRE DIALYSIS:   Otilia Velez RN  TIME: 1000   ACCESS   CATHETER ACCESS: [x] N/A  [] RIGHT  [] LEFT  [] IJ  [] SUBCL [] FEM                    [] First use X-ray  [] Tunnel     [] Non-Tunneled      [] No S/S infection  [] Redness [] Drainage  [] Cultured [] Swelling [] Pain                    [] Medical Aseptic [] Prep Dressing Changed                  [] Clotted [] Patent []      Flows: [] Good [] Poor [] Reversed                 If Access Problem Dr. Michael Grady: [] Yes [] No    Date:_____  [] N/A[]   GRAFT/FISTULA ACCESS:  [] N/A  [] RIGHT  [x] LEFT  [x] UE   [] LE       [x] AVG  [] AVF [] BUTTONHOLE    [x] +BRUIT/THRILL [x] MEDICAL ASEPTIC PREP     [x] No S/S infection  [] Redness [] Drainage  [] Cultured [] Swelling [] Pain              If Access Problem Dr. Pratima Billings: [] Yes [] No    Date:______ [] N/A   GENERAL ASSESSMENT   LUNGS:  SaO2% __99__ [x] Clear [] Coarse [] Crackles [] Wheezing               [] Diminished Location: [] RLL [] LLL [] RUL [] KYLAH    COUGH:  [] Productive  [] Loose[] Dry [x] N/A  RESPIRATIONS: [x] Easy [] Labored    THERAPY: [x] RA   [] NC _____. L/min    Mask: [] NRB [] Venti  _____O2%                  [] Ventilator [] Intubated [] Trach [] BiPap [] CPap [] HI Flow   CARDIAC: [x] Regular [] Irregular [] Pericardial Rub [] JVD               Monitored Rhythm:__SINUS RYTHEM___ [] N/A   EDEMA: [x] None [] Generalized [] Facial [] Pedal [] UE [] LE             [] Pitting [] 1 [] 2 [] 3 [] 4    [] Right [] Left [] Bilateral   SKIN:    [x] Warm [] Hot [] Cold  [x] Dry [] Pale [] Diaphoretic              [] Flushed [] Jaundiced [] Cyanotic [] Rash [] Weeping     LOC:    [x] Alert  Oriented to: [x] Person [x] Place [x] Time             [] Confused [] Lethargic [] Medicated [] Non-responsive    GI/ABDOMEN: [] Flat [] Distended [x] Soft [] Firm [] Diarrhea [x] Bowel Sounds Present [] Nausea [] Vomiting    PAIN: [x] 0 [] 1 [] 2 [] 3 [] 4 [] 5 [] 6 [] 7 [] 8 [] 9 [] 10          Scale 1-10 Action/Follow Up_____   MOBILITY: [] Amb [] Amb/Assist [x] Bed  [] Wheelchair    CURRENT LABS   HBsAg ONLY: Date Drawn:  8/27/21           [x] Negative [] Positive [x] Unknown.      HBsAb: Date Drawn:   8/27/21           [x] Susceptible <10 [] Immune ?10 [] Unknown   Date of Current Labs:  ATTACHED     EDUCATION   Person Educated: [x] Patient [] Other_________   Knowledge base: [] None [x] Minimal [] Substantial    Barriers to learning  [x] None _______________   Preferred method of learning: [] Written [x] Oral [x] Visual [] Hands on    Topic: [x] Access Care [] S&S of infection [x] Fluid Management   [] K+  [x] Procedural  [] Albumin [] Medications [] Tx Options   [] Transplant [] Diet [] Other    Teaching Tools: [x] Explain [x] Demonstration [] Handout_____ [] Video______  CARE PLAN    [x] Renal Failure (Adult)  Interdisciplinary  · Fluid and electrolytes stabilized  ? Interventions  · Dehydration signs and symptoms (eg: Weight/lab monitoring; vomiting/diarrhea/urine; tenting; mucous membranes; dizziness/lethargy/irritability/confusion; weak pulse; tachycardia; blood pressure; I&O)  · Fluid overload signs and symptoms assessment (eg: Body weight increased; dyspnea; edema; hypertension; respiratory crackles/wheezing; JVD; lab monitoring; mental status changes; I&O)  · Monitor appropriate lab values  · COMPLIANCE WITH PRESCRIBED THERAPY  · ARTERIAL ACCESS SITE ASSESSMENT  · NUTRITION SCREENING  · Vital signs monitoring per assessed patient condition or unit standard  · Cardiac monitoring  · Hydration management  · Intake and output measurement  · Body weight monitoring  · Skin care  · DIALYSIS  · Nutrition Care Process per nutrition screen  · Oral hygiene care every 2 hours  · Pain management     · Outcome   ? [x] Progressing Towards Goal  ? [] Not Progressing Towards Goals  ? [] Goals Met/Resolved  ? [] Goals Not Met/ Resolved        · Patient/ Family Education  ?  Progressing Towards Goals          RO/HEMODIAYLSIS MACHINE SAFETY CHECKS- BEFORE EACH TREATMENT          [] [facility]: Machine Serial #1:  [id number]   RO Serial #1: [id number]                       [x] [facility]: Machine Serial #2:  [id number]   RO Serial #2: [id number]        [] [facility]: Machine Serial #3:  [id number]     Serial #5:[id number]    Alarm Test: [x] Pass  Time___1018_____  [x] RO/Machine Log Complete    [x] Extracorporeal circuit Tested for integrity           Dialyzer__[id number]_______   Tubing_21C04-12__    Dialysate: pH__7.4_  Temp.__37___Conductivity: Meter __14__ HD Machine__13.8_   CHLORINE TESTING- BEFORE EACH TREATMENT AND EVERY 4 HOURS   Total Chlorine: [x] Less than 0.1 ppm Time:__1015___2nd Check Time:______  (If greater than 0.1 ppm from Primary then every 30 minutes from Secondary)   TREATMENT INIATION-WITH DIALYSIS PRECAUTIONS   [x] All Connections Secured   [x] Saline Line Double Clamped    [x] Venous Parameters Set [x] Arterial Parameters Set    [x] Prime Given 250 ml     [x] Air Foam Detector Engaged   PRE-TREATMENT   UF Calculations: Wt to lose:_1500__ml(+) Oral:_0_ml(+)IV Meds/Fluids/Blood prods_0__ml(+) Prime/Rinse__500_ml(=)Total UF Goal__2000__mL   Scale Type:[x] Bed scale [] Sling Scale [] Wheel Chair Scale []  Not Ordered [] [] Unable to obtain pt on stretcher/ bed scale malfunctioning   Tx Initiation Note: RECEIVED REPORT. PATIENT ALERT AND ORIENTED. NAD. ALL QUESTIONS ANSWERED WITH PATIENT  SAFETY CHECKS COMPLETE. TIME OUT COMPLETE. TREATMENT INITIATED VIA _LUE AVG____. NO CONCERNS NOTED. [x] Time Out/Safety Check  Time:_1025   INTRADIALYTIC MONITORING  (SEE ATTACHED FLOWSHEET)     POST TREATMENT    Time Medication Dose Volume Route    Initials                                           [facility] Signatures Title Initials Time   STEPHEN ZHOU RN PAP                Dialyzer cleared: [x] Good [] Fair [] Poor     Blood Processed _80.4_Liters    Net UF Removed __250__mL  Post Tx Access:                  AVF/AVG: Bleeding Stop       Art._8__min Chaim.__8_min [x]+bruit/thrill                Catheter: Locking Solution [] Heparin 1 ml/1000 units  [] Normal Saline                                                                               Art._____ ml Chaim._____ml  Post Assessment:              Skin: [x]Warm [x]Dry []Diaphoretic []Flushed [] Pale []Cyanotic            Lungs: [x]Clear []Coarse []Crackles []Wheezing             Cardiac: [x]Regular []Irregular  []Monitored rhythm_SINUS TACH___ []N/A            Edema: [x]None []General []Facial []Pedal  []UE []LE []RIGHT []LEFT            Pain: [x]0 []1 []2 []3 []4 []5 []6 []7 []8 []9 []10   POST Tx Note:TREATMENT COMPLETED. ALL POSSIBLE BLOOD RETURNED.    VITAL SIGNS WNL FOR PATIENT. NAD. DE CANULATED ARTERIAL AND VENOUS SITES WITHOUT INCIDENT. REPORT GIVEN WITH OPPORTUNITY TO ADDRESS ANY CONCERNS OR QUESTTIONS AND PATIENT STAYS ROOM IN BED WITHOUT DISTRESS OR ACUTE DISCOMFORT. BED LOWED, CALL BELL WITHIN REACH .      Primary Nurse Report: First initial/Last name/Title    Post Dialysis:_Otilia Velez RN__         Time:__1430__    Abbreviations: AVG-arterial venous graft, AVF-arterial venous fistula, IJ-Internal Jugular,  Subcl-Subclavian, Fem-Femoral, Tx-treatment, AP/HR-apical heart rate, DFR-dialysate flow rate, BFR-blood flow rate, AP-arterial pressure, -venous pressure, UF-ultrafiltrate, TMP-transmembrane pressure, Chaim-Venous, Art-Arterial, RO-Reverse Osmosis

## 2021-09-06 NOTE — DISCHARGE SUMMARY
Discharge Summary  Subjective:       Admit Date: 8/26/2021  Discharge Date:  9/6/2021, 10:25 AM    Discharging Physician: Roman Whalen pager [phone]  Primary Care Provider:  Shamika Hinojosa MD    Patient ID:  Som Yi.  54 y.o. male  1970    Reason for Admission:  Hypoglycemia associated with diabetes ([address] [E11.649]  Uremia [N19]  Non-compliance with renal dialysis ([address] [Z91.15]  Hyperkalemia [E87.5]  Hypertensive crisis [I16.9]    Discharge Diagnosis:   1. Metabolic encephalopathy- resolved. 2. Diarrhea- resolved. Cdiff neg. Enteric pathogens neg  3. Acute on chronic osteomyelitis of calcaneous w  ulceration  4.  ESRD on HD  5. H/ o BKA  6. ESBL in wound- on contact isolation  7. Chronic LV systolic CHF EF 44%= compensated  8. Hyperkalemia- resoled  9.  Cerebellar CVA- old  8. 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  male who has history of chronic kidney disease on dialysis Monday Wednesday Friday, right BKA, diabetes, congestive heart failure last EF 40%, hypertension, sickle cell trait presents to the emergency room via EMS was found unresponsive.   Blood sugar at that time was forty-three IV access was the photo obtained oral tablets were given until he became responsive. Upon arrival arousable patient became combative and required Ativan and had difficult IV access. Central line was placed with difficulty due to combativeness post x-ray central line shows no pneumothorax and correct placement however there is substantial amount of bleeding at the site. Patient missed dialysis for about a week he states that he was feeling too weak to go to dialysis. He has had intermittent bouts of diarrhea there is a history of C. difficile being positive back in December 2020. Patient was in our emergency room a repeat C. difficile was done which is essentially negative but initial was intermediate. Patient states that he still has bouts of diarrhea. He is fully vaccinated denied any known Covid exposures. He has his chronic foot ulcer that he has been taking care of himself but has not gotten any worse according to him on his left foot. There is some tenderness when you touch in the emergency room patient was found to be severely hyperkalemic with nonspecific EKG changes but no peaked T's he was started on insulin dextrose/hyperkalemia protocol\"      He was admitted to the hospitalist services and was started on dialysis. He was started on broad spectrum antibitocs and had gradual improvement in his blood pressure and mental status. His diarrhea resolved. He had negative Cdiff PCR and enteric pathogens. He is currently alert and oriented x4, in no acute distress. His diarrhea has resolved. Has been tolerating dialysis. He is stable for discharge home with outpatient follow-up. He is to have his chronic calcaneus ulceration with concern of possible chronic osteomyelitis work-up as an outpatient. He currently does not require systemic antibiotics. All questions answered. Red flags discussed regarding when to seek emergent care. Pertinent Imaging/ Operative procedures:     CT abdomen/pelvis:\"    1. No significant colonic wall thickening or inflammatory change to suggest  colitis. 2. Mild/moderate gaseous distention of stomach and proximal small bowel, without  discrete area of obstruction noted. Findings are nonspecific and may reflect a  reactive ileus. 3. No evidence of obstructive uropathy. \"    CTA chest:\" IMPRESSION     Mild exam limitations as noted. No evidence of PE. No acute pulmonary process  Identified. \"    Xray foot\"   Cellulitis and ulceration involving left hindfoot with findings suggestive of  chronic osteomyelitis involving the calcaneus. No radiographic evidence of  active osteomyelitis within the left foot. \"    PVL LE: no DVT    2d echo:\"      · Echo study was limited due to patient's tolerance. · LV: Estimated LVEF is 50 - 55%. Normal cavity size. Moderate concentric hypertrophy. Low normal systolic function. Unable to assess diastolic function due to elevated heart rate. · LA: Mildly dilated left atrium. · RV: Not well visualized. Normal global systolic function. · MV: Mitral valve non-specific thickening. Mitral valve leaflet calcification without reduced excursion. · PA: Mild pulmonary hypertension. Pulmonary arterial systolic pressure is 33 mmHg. \"         Pertinent Results:    CMP:   Lab Results   Component Value Date/Time     09/06/2021 06:15 AM    K 4.5 09/06/2021 06:15 AM     09/06/2021 06:15 AM    CO2 28 09/06/2021 06:15 AM    AGAP 8 09/06/2021 06:15 AM    GLU 95 09/06/2021 06:15 AM    BUN 46 (H) 09/06/2021 06:15 AM    CREA 7.97 (H) 09/06/2021 06:15 AM    GFRAA 9 (L) 09/06/2021 06:15 AM    GFRNA 7 (L) 09/06/2021 06:15 AM    CA 8.2 (L) 09/06/2021 06:15 AM    MG 2.7 (H) 09/06/2021 06:15 AM    PHOS 5.9 (H) 09/06/2021 06:15 AM     CBC:   Lab Results   Component Value Date/Time    WBC 5.1 09/06/2021 06:15 AM    HGB 10.1 (L) 09/06/2021 06:15 AM    HCT 31.6 (L) 09/06/2021 06:15 AM     09/06/2021 06:15 AM         Physical Exam on Discharge:  Visit Vitals  BP (!) 154/66 (BP 1 Location: Left upper arm, BP Patient Position: At rest)   Pulse 86   Temp 97.9 °F (36.6 °C)   Resp 16   Ht 5' 7\" (1.702 m)   Wt 77 kg (169 lb 12.1 oz)   SpO2 97%   BMI 26.59 kg/m²     Body mass index is 26.59 kg/m². GEN:NAD  HEART: S1 S2  NEURO: nonfocal   Condition at discharge: stable    Discharge Medications  Current Discharge Medication List      START taking these medications    Details   aspirin delayed-release 81 mg tablet Take 1 Tablet by mouth daily. Qty: 30 Tablet, Refills: 0         CONTINUE these medications which have NOT CHANGED    Details   doxazosin (CARDURA) 4 mg tablet Take 2 mg by mouth daily. sodium hypochlorite 0.0125% (DAKINS SOLUTION) Apply 1 mL to affected area two (2) times a week. omeprazole (PRILOSEC) 40 mg capsule Take 40 mg by mouth daily. sevelamer carbonate (RENVELA) 800 mg tab tab Take 800 mg by mouth three (3) times daily (with meals). sodium bicarbonate 650 mg tablet Take 650 mg by mouth three (3) times daily. amLODIPine (NORVASC) 10 mg tablet Take 1 Tab by mouth daily. Qty: 30 Tab, Refills: 0      carvedilol (COREG) 25 mg tablet Take 1 Tab by mouth two (2) times daily (with meals). Qty: 60 Tab, Refills: 0      hydrALAZINE (APRESOLINE) 25 mg tablet Take 1 Tab by mouth three (3) times daily. Qty: 90 Tab, Refills: 0      isosorbide mononitrate ER (IMDUR) 60 mg CR tablet Take 1 Tab by mouth daily. Qty: 30 Tab, Refills: 0      furosemide (LASIX) 80 mg tablet Take 1 Tab by mouth two (2) times a day.   Qty: 60 Tab, Refills: 0         STOP taking these medications       fidaxomicin (DIFICID) 200 mg tab tablet Comments:   Reason for Stopping:         cefazolin sodium/dextrose,iso (CEFAZOLIN IN DEXTROSE, ISO-OS,) 2 gram/50 mL pgbk Comments:   Reason for Stopping:               Disposition: home   Follow up:  Pcp, nephrology, ID, podiatry  Restrictions: none    Diagnostic Imaging/Labs pending at discharge: none      Yoly Banegas, [address] [facility]  Internal Medicine/Geriatrics    Time Spent 40 minutes with >50% coordination of care          CC: Jacki Rodrigues MD

## 2021-09-06 NOTE — PROGRESS NOTES
Assessment:     ESRD  HTN  Anemia  Non complinace  BKA  CHF  DM        Plan:   HD  MWF  Retacrit  Calcitriol    Educated pt regarding importance of getting 12 hr dialysis a week. seen on HD  CC: ESRD  Interval History: no overnight events        Subjective:   PT is back to his baseline mental status         Blood pressure (!) 154/66, pulse 86, temperature 97.9 °F (36.6 °C), resp. rate 16, height 5' 7\" (1.702 m), weight 77 kg (169 lb 12.1 oz), SpO2 97 %.       awake and alert   NAD      Intake/Output Summary (Last 24 hours) at 9/6/2021 1058  Last data filed at 9/5/2021 1920  Gross per 24 hour   Intake 240 ml   Output 0 ml   Net 240 ml      Recent Labs     09/06/21  0615   WBC 5.1     Lab Results   Component Value Date/Time    Sodium 139 09/06/2021 06:15 AM    Potassium 4.5 09/06/2021 06:15 AM    Chloride 103 09/06/2021 06:15 AM    CO2 28 09/06/2021 06:15 AM    Anion gap 8 09/06/2021 06:15 AM    Glucose 95 09/06/2021 06:15 AM    BUN 46 (H) 09/06/2021 06:15 AM    Creatinine 7.97 (H) 09/06/2021 06:15 AM    BUN/Creatinine ratio 6 (L) 09/06/2021 06:15 AM    GFR est AA 9 (L) 09/06/2021 06:15 AM    GFR est non-AA 7 (L) 09/06/2021 06:15 AM    Calcium 8.2 (L) 09/06/2021 06:15 AM        Current Facility-Administered Medications   Medication Dose Route Frequency Provider Last Rate Last Admin    paricalcitoL (ZEMPLAR) injection 4 mcg  4 mcg IntraVENous DIALYSIS TUE, THU & SAT Malissa Escort, DO   4 mcg at 09/04/21 1212    epoetin delonte-epbx (RETACRIT) injection 3,000 Units  3,000 Units IntraVENous Q TUE, THU & SAT Yanet Landry R, DO   3,000 Units at 09/04/21 2207    insulin lispro (HUMALOG) injection   SubCUTAneous AC&HS Sophy Watson MD   2 Units at 09/04/21 1728    LORazepam (ATIVAN) tablet 1 mg  1 mg Oral Q4H PRN Jesica Tompkins MD        LORazepam (ATIVAN) injection 1 mg  1 mg IntraVENous Q4H PRN Jesica Tompkins MD   1 mg at 09/02/21 1336    losartan (COZAAR) tablet 50 mg  50 mg Oral DAILY Juan J Groves MD 50 mg at 09/06/21 0945    diphenhydrAMINE (BENADRYL) injection 12.5 mg  12.5 mg IntraVENous Q6H PRN Breana Morgan MD   12.5 mg at 09/01/21 2110    thiamine (B-1) injection 100 mg  100 mg IntraMUSCular DAILY José Alston MD   100 mg at 09/04/21 0925    labetaloL (NORMODYNE;TRANDATE) 20 mg/4 mL (5 mg/mL) injection 10 mg  10 mg IntraVENous Q6H PRN Kita Quintero MD        hydrALAZINE (APRESOLINE) 20 mg/mL injection 20 mg  20 mg IntraVENous Q6H PRN José Alston MD        glucose chewable tablet 16 g  4 Tablet Oral PRN Breana Morgan MD        glucagon (GLUCAGEN) injection 1 mg  1 mg IntraMUSCular PRN Breana Morgan MD        dextrose (D50W) injection syrg 12.5-25 g  25-50 mL IntraVENous PRN Breana Morgan MD   12.5 g at 08/31/21 0914    aspirin delayed-release tablet 81 mg  81 mg Oral DAILY Jose Garcia MD   81 mg at 09/06/21 0945    acetaminophen (TYLENOL) tablet 650 mg  650 mg Oral Q6H PRN Carissa Watson MD   650 mg at 08/28/21 0242    Or    acetaminophen (TYLENOL) suppository 650 mg  650 mg Rectal Q6H PRN Carissa Watson MD        polyethylene glycol (MIRALAX) packet 17 g  17 g Oral DAILY PRN Carissa Watson MD        heparin (porcine) injection 5,000 Units  5,000 Units SubCUTAneous Q8H Carissa Watson MD   5,000 Units at 09/06/21 0610    promethazine (PHENERGAN) tablet 12.5 mg  12.5 mg Oral Q6H PRN Carissa Watson MD        Or    ondansetron (ZOFRAN) injection 4 mg  4 mg IntraVENous Q6H PRN Carissa Watson MD   4 mg at 09/01/21 0435    amLODIPine (NORVASC) tablet 10 mg  10 mg Oral DAILY Jef San MD   10 mg at 09/06/21 0945    carvediloL (COREG) tablet 25 mg  25 mg Oral BID WITH MEALS Jef San MD   25 mg at 09/06/21 0945    isosorbide mononitrate ER (IMDUR) tablet 60 mg  60 mg Oral DAILY Jef San MD   60 mg at 09/06/21 0945    sevelamer carbonate (RENVELA) tab 1,600 mg  1,600 mg Oral TID WITH MEALS Alecia MD José   1,600 mg at 09/05/21 1846    heparin (porcine) 1,000 unit/mL injection 2,000 Units  2,000 Units Hemodialysis DIALYSIS PRN Edd Marc MD

## 2021-09-06 NOTE — PROGRESS NOTES
Noted pt transfer to [address] [place] [address]. Noted therapy recommendation for a transfer bench. This item/DME is not cover by insurance. Pt may have to obtain this item on his own and pay privately. CM to continue to follow and assist.    Care Management Interventions  PCP Verified by CM: Yes  Palliative Care Criteria Met (RRAT>21 & CHF Dx)?: No  Current Support Network:  Other  Confirm Follow Up Transport: Family

## 2021-09-06 NOTE — DISCHARGE INSTRUCTIONS
Patient Education        Skin Abscess: Care Instructions  Your Care Instructions     A skin abscess is a bacterial infection that forms a pocket of pus. A boil is a kind of skin abscess. The doctor may have cut an opening in the abscess so that the pus can drain out. You may have gauze in the cut so that the abscess will stay open and keep draining. You may need antibiotics. You will need to follow up with your doctor to make sure the infection has gone away. The doctor has checked you carefully, but problems can develop later. If you notice any problems or new symptoms, get medical treatment right away. Follow-up care is a key part of your treatment and safety. Be sure to make and go to all appointments, and call your doctor if you are having problems. It's also a good idea to know your test results and keep a list of the medicines you take. How can you care for yourself at home? · Apply warm and dry compresses, a heating pad set on low, or a hot water bottle 3 or 4 times a day for pain. Keep a cloth between the heat source and your skin. · If your doctor prescribed antibiotics, take them as directed. Do not stop taking them just because you feel better. You need to take the full course of antibiotics. · Take pain medicines exactly as directed. ? If the doctor gave you a prescription medicine for pain, take it as prescribed. ? If you are not taking a prescription pain medicine, ask your doctor if you can take an over-the-counter medicine. · Keep your bandage clean and dry. Change the bandage whenever it gets wet or dirty, or at least one time a day. · If the abscess was packed with gauze:  ? Keep follow-up appointments to have the gauze changed or removed. If the doctor instructed you to remove the gauze, follow the instructions you were given for how to remove it. ? After the gauze is removed, soak the area in warm water for 15 to 20 minutes 2 times a day, until the wound closes.   When should you call for help? Call your doctor now or seek immediate medical care if:    · You have signs of worsening infection, such as:  ? Increased pain, swelling, warmth, or redness. ? Red streaks leading from the infected skin. ? Pus draining from the wound. ? A fever. Watch closely for changes in your health, and be sure to contact your doctor if:    · You do not get better as expected. Where can you learn more? Go to http://www.gray.com/  Enter S093 in the search box to learn more about \"Skin Abscess: Care Instructions. \"  Current as of: July 2, 2020               Content Version: 12.8  © 2134-7067 [employer]. Care instructions adapted under license by [employer] (which disclaims liability or warranty for this information). If you have questions about a medical condition or this instruction, always ask your healthcare professional. [address] any warranty or liability for your use of this information.

## 2021-09-06 NOTE — PROGRESS NOTES
Physician Progress Note      PATIENT:               Anthony Kirby  CSN #:                  [id number]  :                       1970  ADMIT DATE:       2021 6:20 PM  [address] [place] DATE:        2021 4:40 PM  RESPONDING  PROVIDER #:        Faustino Mckeon MD          QUERY TEXT:    Patient admitted with hyperkalemia and metabolic encephalopathy. Noted documentation of Pneumonia in progress notes, further documentation states no symptoms and not evident on CT. If possible, please document in progress notes and discharge summary if you are evaluating and /or treating any of the following: The medical record reflects the following:  Risk Factors: ESRD, HTN, CHF  Clinical Indicators: Patient presented to ER with AMS, missed multiple HD sessions. WBC 3.4 83% neuts    VS T 98 P 84 RR 20 /126   CXR: Plain film findings suggestive of edema edema/atelectasis, favoring fluid  overload over CHF given the patient's of dialysis history   CTA chest: No evidence of PE. No acute pulmonary process identified. Hospitalist progress notes on -9/3: Pneumonia on xray, no symptoms, not on CT, no clinical sx  ? Treatment: CXR, IV Cefepime, IV levaquin, CT chest, abx d/c'd    Cristian Travis RN, BSN, [address] [place] [address]  [phone]  Options provided:  -- Pneumonia confirmed POA  -- Pneumonia ruled out  -- Other - I will add my own diagnosis  -- Disagree - Not applicable / Not valid  -- Disagree - Clinically unable to determine / Unknown  -- Refer to Clinical Documentation Reviewer    PROVIDER RESPONSE TEXT:    After study, Pneumonia ruled out.     Query created by: Madina Rossi on 9/3/2021 11:36 AM      Electronically signed by:  Faustino Mckeon MD 2021 5:31 PM

## 2021-09-06 NOTE — PROGRESS NOTES
Physical Therapy Treatment Attempt     Chart reviewed. Attempted Physical Therapy Treatment, however, patient unable to be seen due to:  []  Nausea/vomiting  []  Eating  []  Pain  []  Patient too lethargic  []  Off Unit for testing/procedure  [x]  [address] Dialysis treatment in progress   []  Telemetry Results  []  Other:      Will follow up later as patient's schedule allows.    Thank you for this referral.    Hattie Lee, PT, DPT

## 2021-09-06 NOTE — PROGRESS NOTES
1920: Assumed patient care from [facility]. Patient is alert and oriented to person, place, time and situation. Respiratory status is stable on room air. Vital signs are stable. MEWS score is a one. Patient denies any pain, discomfort, nausea vomiting dizziness or anxiety. White board and fall card is updated. Bed is locked and in lowest position. Call bell, water and personal belongings are within reach. Patient has no questions, comments or concerns after bedside shift report. 0320: Patient had an uneventful shift. His level of care was down graded and he was transferred out to [address] [place] [address]. Patient's respiratory status, vital signs and MEWS score remained stable. He was resting quietly with no signs of distress noted. Bed locked and in lowest position. Call bell water and personal belongings were within reach. Patient had no questions, comments or concerns after bedside shift report.  [facility]

## 2021-09-06 NOTE — ROUTINE PROCESS
Bedside and Verbal shift change report given to TEQUILA Vergara RN (oncoming nurse) by FRANCISCO King RN (offgoing nurse). Report included the following information SBAR, Kardex, Intake/Output, MAR and Recent Results.

## 2021-09-06 NOTE — PROGRESS NOTES
Patient received Dialysis, tolerated well, 1.5  removed. Verbal order received from Dr. Jatinder Burrows to removed patient PICC prior to discharge. Picc removed. Patient tolerated procedure well. Pressure dressing applied to patient site.

## 2021-09-06 NOTE — PROGRESS NOTES
Afebrile  C.diff neg  Enteric bacteria panel neg  Stool wbc- neg  Stool O/P and crytposporidium --Pending    Probable chronic L calcneal OM with heel plantar ulcer    Plan:  FU stool results  Further RAMIREZ for left calcneal OM can be pursued as OP  Additional chronic diarrhea eval by GI- can be pursued as directed by referring MD    Remote chart review. Will resume routine rounding on Tuesday, Sept 7 -2021. Please call via  or [employer] for questions/consults over weekend. Thanks. Joseph Choi MD  [facility])  Office #:     [phone]-FORUWV #[phone]   Office Fax: [phone]

## 2021-09-07 ENCOUNTER — HOME CARE VISIT (OUTPATIENT)
Dept: HOME HEALTH SERVICES | Facility: HOME HEALTH | Age: 51
End: 2021-09-07
Payer: MEDICARE

## 2021-09-07 ENCOUNTER — HOME CARE VISIT (OUTPATIENT)
Dept: HOME HEALTH SERVICES | Facility: HOME HEALTH | Age: 51
End: 2021-09-07

## 2021-09-07 ENCOUNTER — HOME CARE VISIT (OUTPATIENT)
Dept: SCHEDULING | Facility: HOME HEALTH | Age: 51
End: 2021-09-07
Payer: MEDICARE

## 2021-09-07 VITALS
DIASTOLIC BLOOD PRESSURE: 84 MMHG | SYSTOLIC BLOOD PRESSURE: 143 MMHG | OXYGEN SATURATION: 100 % | HEART RATE: 85 BPM | TEMPERATURE: 97.2 F | RESPIRATION RATE: 16 BRPM

## 2021-09-07 PROCEDURE — G0299 HHS/HOSPICE OF RN EA 15 MIN: HCPCS

## 2021-09-07 PROCEDURE — A6212 FOAM DRG <=16 SQ IN W/BORDER: HCPCS

## 2021-09-07 PROCEDURE — 3331090002 HH PPS REVENUE DEBIT

## 2021-09-07 PROCEDURE — A6216 NON-STERILE GAUZE<=16 SQ IN: HCPCS

## 2021-09-07 PROCEDURE — 3331090001 HH PPS REVENUE CREDIT

## 2021-09-07 PROCEDURE — 400018 HH-NO PAY CLAIM PROCEDURE

## 2021-09-07 PROCEDURE — 400013 HH SOC

## 2021-09-07 PROCEDURE — MED11179 BANDAGE,ELASTIC,MATRIX,STRL,4X10YD,LF

## 2021-09-07 NOTE — HOME HEALTH
Skilled services/Home bound verification:     Skilled Reason for admission/summary of clinical condition:  Patient was recently hospitalized for  DM with foot ulcer, requiring observation by a SN for s/s of decomposition or adverse effects resulting from newly prescribed medications. Skilled observation needed to determine if new medication regimen prescribed requires modifications or other therapeutic interventions considered until pt's clinical condition or treatment has stabilized. .  This patient is homebound for the following reasons Requires considerable and taxing effort to leave the home , Requires the assistance of 1 or more persons to leave the home  and Only leaves the home for medical reasons or [employer] services and are infrequent and of short duration for other reasons     Caregiver: relative. Caregiver assists with . Caregiver assists patient with bathing, dressing, bathroom, meal prep and setup, medication management, grocery shopping, household chores, transportation to MD appointment and home exercise program.    Medications reconciled and all medications are available in the home this visit. The following education was provided regarding medications, medication interactions, and look alike medications (specify): hydrALAZINE (APRESOLINE) 25 mg tablet. Medications  are somewhat effective at this time. High risk medication teaching regarding anticoagulants, hyperglycemic agents or opoid narcotics performed (specify) Ahmet LOZA DO and Jacki Rodrigues MD  notified of any discrepancies/medication interactions. Medication discrepancies found.  Patient is not currently taking at home, as listed on hospital discharge med list: amLODIPine (NORVASC) 10 mg tablet , carvedilol (COREG) 25 mg tablet , doxazosin (CARDURA) 4 mg tablet , furosemide (LASIX) 80 mg tablet, isosorbide mononitrate ER (IMDUR) 60 mg CR tablet, omeprazole (PRILOSEC) 40 mg capsule, sevelamer carbonate (RENVELA) 800 mg tab tab, sodium hypochlorite 0.0125% (DAKINS SOLUTION),       Home health supplies by type and quantity ordered/delivered this visit include: All wound care supplies    Patient education provided this visit to include:   . X. Gregg Bryant Gregg Annette Bryant Disease Management: ESRD, HTN, DM, wound care, pain management, constipation prevention, fall precautions, s/s of infection, deep breathing exercises. .x.... Breathing Exercises (HEP):): Reviewed HEP in admission packet - patient performed return demonstration of deep breathing exercise. Patient to perform 5 deep breathing exercises every 2 hours, while awake. Arvid Aruns.. Greggquinton Bryant Diet/Nutrition: Importance of a healthy AHA, ADA, Renal diet, to promote healing and wellness  . Arvid Hermanns... Wound Care:   Keep wound clean, covered and dry, s/s of infection, elevate  . Arvid Hermanns... Infection Control:   Proper hand washing with soap and water, after each trip to the bathroom and after eating meals  . Arvid Hermanns... Other:  safety and fall precautions, importance of repositioning self to reduce pressure points    . Patient/caregiver degree of understanding:good    Home exercise program/Homework provided: PT and OT    Pt/Caregiver instructed on plan of care and are agreeable to plan of care at this time. Physician Khoi Osorio DO and Néstor Sesay MD  notified of patient admission to home health and plan of care including anticipated frequency of SN and treatments/interventions/modalities of SN. Discharge planning discussed with patient and caregiver. Discharge planning as follows: SN discharge when teaching and goals are met. Gregg Bryant Pt/Caregiver did verbalize understanding of discharge planning. Next MD appointment TBD (date) with Dr Yasmine Bullard MD.  Jenifer Maciel encouraged/instructed to keep appointment as lack of follow through with physician appointment could result in discontinuation of home care services for non-compliance.     Order sent to Dr Zurdo Romero regarding patient not having Dakin's solution on admit visit. Order to use [facility] until Dakin's can be ordered and delivered to patient.

## 2021-09-08 ENCOUNTER — HOME CARE VISIT (OUTPATIENT)
Dept: HOME HEALTH SERVICES | Facility: HOME HEALTH | Age: 51
End: 2021-09-08
Payer: MEDICARE

## 2021-09-08 LAB
CRYPTOSP STL QL ACID FAST STN: NORMAL
I BELLI SPEC QL ACID FAST MOD KINY STN: NORMAL
SPECIMEN SOURCE: NORMAL

## 2021-09-08 PROCEDURE — 3331090002 HH PPS REVENUE DEBIT

## 2021-09-08 PROCEDURE — 3331090001 HH PPS REVENUE CREDIT

## 2021-09-09 ENCOUNTER — HOME CARE VISIT (OUTPATIENT)
Dept: HOME HEALTH SERVICES | Facility: HOME HEALTH | Age: 51
End: 2021-09-09
Payer: MEDICARE

## 2021-09-09 PROCEDURE — 3331090001 HH PPS REVENUE CREDIT

## 2021-09-09 PROCEDURE — 3331090002 HH PPS REVENUE DEBIT

## 2021-09-10 ENCOUNTER — HOME CARE VISIT (OUTPATIENT)
Dept: HOME HEALTH SERVICES | Facility: HOME HEALTH | Age: 51
End: 2021-09-10
Payer: MEDICARE

## 2021-09-10 PROCEDURE — 3331090001 HH PPS REVENUE CREDIT

## 2021-09-10 PROCEDURE — 3331090002 HH PPS REVENUE DEBIT

## 2021-09-11 ENCOUNTER — HOME CARE VISIT (OUTPATIENT)
Dept: SCHEDULING | Facility: HOME HEALTH | Age: 51
End: 2021-09-11
Payer: MEDICARE

## 2021-09-11 PROCEDURE — 3331090002 HH PPS REVENUE DEBIT

## 2021-09-11 PROCEDURE — 3331090001 HH PPS REVENUE CREDIT

## 2021-09-11 PROCEDURE — G0299 HHS/HOSPICE OF RN EA 15 MIN: HCPCS

## 2021-09-12 ENCOUNTER — HOME CARE VISIT (OUTPATIENT)
Dept: SCHEDULING | Facility: HOME HEALTH | Age: 51
End: 2021-09-12
Payer: MEDICARE

## 2021-09-12 VITALS
SYSTOLIC BLOOD PRESSURE: 145 MMHG | DIASTOLIC BLOOD PRESSURE: 85 MMHG | TEMPERATURE: 97.8 F | HEART RATE: 93 BPM | RESPIRATION RATE: 17 BRPM | OXYGEN SATURATION: 96 %

## 2021-09-12 PROCEDURE — G0151 HHCP-SERV OF PT,EA 15 MIN: HCPCS

## 2021-09-12 PROCEDURE — 3331090002 HH PPS REVENUE DEBIT

## 2021-09-12 PROCEDURE — 3331090001 HH PPS REVENUE CREDIT

## 2021-09-12 NOTE — Clinical Note
Therapy Functional Score Assessment  Question   Score   Grooming  2     Upper Dressing 2    Lower Dressing 3   Bathing  5   Toilet Transfer  2   Transfer  2        Ambulation  3   Dyspnea                     2   Pain Interfering with activity 4  Est number therapy visits      7    Response codes - delete these before sending message   Grooming          0 = Groom Unaided, with or without devices independently   1 = Grooming utensils must be placed in reach- includes access to items or location  2 = Needs Assist- includes cues or reminders  3 = Dependent in grooming     Upper Dressing   Item includes prosthetics, orthotics, braces, and support devices      0 = Can get clothes, don and doff, without assistance  1 = Can dress independently if clothing laid out or handed to patient- includes access to location   2 = Needs help to don/doff upper body clothing- includes cues or reminders  3 = Dependent in upper body clothing- includes inability to SAFELY participate in dressing       Lower Dressing   Item includes prosthetics, orthotics, braces, and support devices      0 = Can get clothes, don and doff clothing and shoes independently  1 = Can dress independently if clothing/shoes laid out or handed to patient- includes access to location  2 = Needs help to don/doff lower body clothing- includes cues or reminders  3 = Dependent in lower body clothing- includes inability to SAFELY participate in dressing     Bathing           0 = Independent in shower or tub including getting in and out of tub/shower  1 = Independent in shower or tub with use of devices  2 = Needs intermittent assistance/supervision in shower or tub- SAFELY  3 = Needs continuous assistance/supervision in shower or tub- SAFELY  4 = Independent sink bather with or without devices- must be able to access sink/basin independently  5 = Needs assist with sink bathing- intermittent OR continuous  6 = Dependent for bathing- includes inability to SAFELY participate in bathing     Toilet Transfer     0 = Independent in getting to and from; on and off toilet with or without a device  1 = When reminded, assisted or supervised able to get to/from toilet and transfer- SAFELY.   2 = Unable to get to and from toilet but is able to use a BSC w/ or w/o assistance- must have a BSC in the home  3 = Unable to get to and from toilet or BSC but is able to use bedpan/urinal independently- must have a bedpan/urinal in the home  4 = Totally dependent in toileting- dependent OR does not meet criteria for responses 2 or 3 OR equipment not present in the home     Transfer       0 = Independent Transfer  1 = Minimal Assist OR use of an Assistive Device- assist includes verbal cueing, environmental set-up, and/or hands on assist   2 = Must be able to bear weight AND pivot, needs Minimal Assist AND use of an Assistive Device OR needs more than 25% assistance  3 = Unable to transfer self, unable to bear weight OR pivot OR unable to bear weight AND pivot but is still transferring out of bed  4 = Bedfast but CAN turn/position self in bed  5 = Bedfast and CANNOT turn/position self in bed    1860 Ambulation            0 = Independent no human assist and no device on any surface and stairs with/without rail  1 = Independent with one handed device on any surface and stairs with/without rail   2 = Requires 2 handed device to be independent on level surfaces and/or requires human supervision or assistance on stairs/steps or uneven surfaces  3 = Only with supervision or assistance at all times- SAFELY  4 = Chairfast, but able to wheel self independently- must have a wheelchair to score independent   5 = Chairfast, unable to wheel self- appropriate response if unsafe to ambulate or if patient can only take a few steps to transfer, may not own a wheelchair  6 = Bedfast, medical restrictions, unable to ambulate or be up in a chair     Dyspnea                       0 = Not SOB  1 = SOB when walking 20 ft or climbing stairs  2 = SOB w/moderate exertion - while dressing, using commode or bedpan, walking less than 20 feet  3 = SOB  w/Minimal Exertion - while eating, talking, or performing other  ADLs or with agitation  4 = SOB At rest, lying down     Pain Interfering with activity  0 = No Pain  1 = Pain does not interfere with activity or movement  2 = Pain less often than daily  3 = Pain daily but not constantly  4 = Pain all the time

## 2021-09-12 NOTE — HOME HEALTH
Prior to the visit, the patient was screened for the followin.) International travel in the last month:             no  2.) Exposure Screening:  Contact with anyone with the following symptoms in the last 14 days:  no  a.) Fever, or lower respiratory illness (shortness of breath or cough) or   b.) Fever with severe acute respiratory illness (pneumonia or acute respiratory distress syndrome) no  3.) The patient has the following symptoms:  a.) Fever, cough, shortness of breath no  Summary of clinical condition: Hospital Course:    Reason for admission ( Admitting HPI): \"Nito Flores is a 54 y.o.  male who has history of chronic kidney disease on dialysis , right BKA, diabetes, congestive heart failure last EF 40%, hypertension, sickle cell trait presents to the emergency room via EMS was found unresponsive. Blood sugar at that time was forty-three IV access was the photo obtained oral tablets were given until he became responsive. Upon arrival arousable patient became combative and required Ativan and had difficult IV access. Central line was placed with difficul ty due to combativeness post x-ray central line shows no pneumothorax and correct placement however there is substantial amount of bleeding at the site. Patient missed dialysis for about a week he states that he was feeling too weak to go to dialysis. He has had intermittent bouts of diarrhea there is a history of C. difficile being positive back in 2020. Patient was in our emergency room a repeat C. difficile was done which is essentially negative but initial was intermediate. Patient states that he still has bouts of diarrhea. He is fully vaccinated denied any known Covid exposures. He has his chronic foot ulcer that he has been taking care of himself but has not gotten any worse according to him on his left foot.   There is some tenderness when you touch in the emergency room patient was found to be severely hyperkalemic with nonsp ecific EKG changes but no peaked T's he was started on insulin dextrose/hyperkalemia protocol\"              He was admitted to the hospitalist services and was started on dialysis. He was started on broad spectrum antibitocs and had gradual improvement in his blood pressure and mental status. His diarrhea resolved. He had negative Cdiff PCR and enteric pathogens. He is currently alert and oriented x4, in no acute distress. His diar steve has resolved. Has been tolerating dialysis. He is stable for discharge home with outpatient follow-up. He is to have his chronic calcaneus ulceration with concern of possible chronic osteomyelitis work-up as an outpatient. He currently does not require systemic antibiotics. All questions answered. Red flags discussed regarding when to seek emergent care. Medications reconciliation  completed. Pt/Caregiver instructed on plan of care and are agreeable to plan of care at this time. Plan of care and admission to home health status called to attending physician: Shamika Hinojosa MD.    Discharge planning discussed with patient and caregiver. Discharge planning as follows: dc to family with MD supervision when all goals are met . Pt/Caregiver did verbalize understanding. PMHx: Metabolic encephalopathy- resolved. 2. Diarrhea- resolved. Cdiff neg. Enteric pathogens neg    3. Acute on chronic osteomyelitis of calcaneous w  ulceration    4. ESRD on HD    5. H/ o BKA    6. ESBL in wound- on contact isolation    7. Chronic LV systolic CHF EF 11%= compensated    8. Hyperkalemia- resoled    9. Cerebellar CVA- old    8. HTN   S: chief complaint: no pain noted.  Patient complained of difficulty with transfers and gait   O:Patients Goals= Be able to go back to PLOF  Wound/Incision: location, description, drainage: refer to SN per patient request. Patient has intact ace wrap on L foot   PLOF: Lives in a 1 level apartment by himself, prior to referral, he was indepedent with basic ADL's. His brother assisted with IADL's was using rollator for mobility   STRENGTH L hip flexor extensor abductors, adductors, knee flexor and extensor 3/5, R hip flexor, extensor, abductors, adductors knee flexor and extensor 3+/5  SIT TO STAND from couch with supervision   BALANCE  Tinetti 11/28 high fall risk due to reduced strength on BLE, gait deviation and decreased efficiency of balance reactions. Patient demonstrated poor use of hip and ankle strategy during standing balance activity. Patient requires support from AD at all times for safety and stability. GAIT Patient ambulated without AD x 10 feet with noted antalgic gait on LLE with supervision. patient was educated on importance of using AD at all times to prevent falls. BED MOBILITY Patient needed supervision with bed mobility   TRANSFERS Patient needed Ester x1 with verbal cues for safety and technique to and from bed, chair and toilet   A: PT evaluation completed, POC established, Med rec done, HEP established  P:Home Safety eval/recommendations: Home health physical therapy initial evaluation performed. Patient demonstrates decreased strength, balance, and endurance which increases patient's overall fall risk and burden of care. Patient would benefit from home health physical therapy to improve balance, strength, and endurance which would decrease fall risk and allow patient to return to prior level of function once all functional goals or full rehab potential is met. patient educated with HEP including seated hip flex, knee extension, hip abduction, hip adduction, ankle PF and DF and ball squeeze x 20 reps x 3 sets daily to improve MMT on BLE to facilitate with improved bed mobility, transfers and gait with AD. patient also educated with deep breathing exercises to be done daily x 10 reps x 3 sets to prevent SOB during activity.  Patient educated with fall prevention technique by decluttering space, proper use of AD and footwear

## 2021-09-13 VITALS
SYSTOLIC BLOOD PRESSURE: 158 MMHG | DIASTOLIC BLOOD PRESSURE: 90 MMHG | RESPIRATION RATE: 17 BRPM | OXYGEN SATURATION: 99 % | HEART RATE: 86 BPM | TEMPERATURE: 97.6 F

## 2021-09-13 LAB
6-ACETYLMORPHINE: NEGATIVE
AMPHETAMINES SERPL QL SCN: NEGATIVE NG/ML
BARBITURATES SERPL QL SCN: NEGATIVE UG/ML
BENZODIAZ SERPL QL SCN: NEGATIVE NG/ML
CANNABINOIDS SERPL QL SCN: NEGATIVE NG/ML
COCAINE+BZE SERPL QL SCN: NEGATIVE NG/ML
CODEINE, 737848: NEGATIVE NG/ML
DIHYDROCODEINE, 764159: NEGATIVE NG/ML
HYDROCODONE, 737854: NEGATIVE NG/ML
HYDROMORPHONE, 737852: NEGATIVE NG/ML
MEPERIDINE, DR296L: NEGATIVE NG/ML
METHADONE SERPL QL SCN: NEGATIVE NG/ML
MORPHINE, 737850: NEGATIVE NG/ML
OPIATE CONFIRMATION,OPIC: NEGATIVE
OPIATES SERPL QL SCN: NEGATIVE NG/ML
OPIATES, 790423: NEGATIVE NG/ML
OXYCODONE, 790407: NEGATIVE NG/ML
PCP SERPL QL SCN: NEGATIVE NG/ML
PROPOXYPH SERPL QL SCN: NEGATIVE NG/ML
TRAMADOL, DR297L: NEGATIVE NG/ML

## 2021-09-13 PROCEDURE — 3331090001 HH PPS REVENUE CREDIT

## 2021-09-13 PROCEDURE — 3331090002 HH PPS REVENUE DEBIT

## 2021-09-14 ENCOUNTER — HOME CARE VISIT (OUTPATIENT)
Dept: SCHEDULING | Facility: HOME HEALTH | Age: 51
End: 2021-09-14
Payer: MEDICARE

## 2021-09-14 ENCOUNTER — HOME CARE VISIT (OUTPATIENT)
Dept: HOME HEALTH SERVICES | Facility: HOME HEALTH | Age: 51
End: 2021-09-14
Payer: MEDICARE

## 2021-09-14 LAB
GIARDIA LAMBLIA AB, IGG: 4.2 U/ML
O+P SPEC MICRO: NORMAL
O+P STL CONC: NORMAL
SPECIMEN SOURCE: NORMAL

## 2021-09-14 PROCEDURE — 3331090002 HH PPS REVENUE DEBIT

## 2021-09-14 PROCEDURE — 3331090001 HH PPS REVENUE CREDIT

## 2021-09-14 NOTE — HOME HEALTH
Skilled reason for visit: type 2 DM with foot ulcer requiring wound care. Caregiver involvement: Patient's cg is his family. cg does not live with patient but is available as needed for assistance with iadls, adls, meal prep, medication management, taking to md appointments. Medications reviewed and all medications are available in the home this visit. The following education was provided regarding medications, medication interactions, and look alike medications (specify): reviewed side effects, purposes, dosage, frequencies. Medications  are effective at this time. Home health supplies by type and quantity ordered/delivered this visit include: pt reporting admitting clinician ordered all supplies- pt needing dakins and foam dressings. Patient education provided this visit:  patient/cg instructed to monitor for edema/increase in edema, to elevate extremity when edema occurs and to notify md if edema exceeds normal limits for patient, none noted at this time. patient made aware to monitor for s/s of infection [increased swelling, increased redness around site, increased pain, foul smelling drainage, fever] aware who to report to/when. no s/s of infection noted. encouraged patient to get three nutritional meals daily and to stay hydrated, drink plenty of fluids. notified patient of heart healthy diet- instructed patient to reduce intake of saturated and fatty acids (butter, creams, red meats, fried foods, margarines, high fat baked goods, shortening, cheeses, etc) and to increase daily fresh fruits and vegetables and whole grains, encouraged to avoid canned and processed foods as much as possible. reviewed low sodium diet- patient aware to limit sodium, no added sodium to diet. reviewed foods to avoid, how to order foods when eating out, how to read nutrition labels and measure sodium intake.  discussed importance of monitoring blood pressure daily and recording for review, discussed hypertension, causes/long term effects of uncontrolled hypertension. pt instructed to follow with diabetic diet- monitoring sugar intake, limiting foods with high sugar content. pt instructed to begin monitoring BS and to ensure BS levels are within good range to ensure proper healing- pt is currently awaiting machine. discussed fall precautions in detail- having lighted hallways, removing throw rugs, monitoring medication that may alter mental status. pt denies any questions or concerns at this time. Skilled Care Performed this visit: wound care performed per orders- old dressing completely removed, wound cleansed with dwc and applied dry dressing due to no dakins/mepilex in home- pt reporting admitting clinician has ordered, secured with gauze and ace wrap. pt instructed to follow a high protein diet for healing- to try to get 90g protein daily. Agency Progress toward goals: goals/teaching reviewed. patient is progressing towards goals at this time, skilled need to continue monitoring status. Patient's Progress towards personal goals: patient is progressing, feeling better each day. Home exercise program: patient instructed to perform sob hep 4-5 x daily and prn for sob, to promote lung expansion. pt also encouraged to use ICS q 2 hours and to perform sob hep during therapy. Continued need for the following skills: Nursing and Physical Therapy    Plan for next visit: continue with wound care    Patient and/or caregiver notified and agrees to changes in the Plan of Care NA    The following discharge planning was discussed with the pt/caregiver: Patient will be discharged once education has completed, patient is medically stable and pt/cg are able to independently manage wound care/ wound has healed or no longer requires skilled care.

## 2021-09-15 LAB
ORG ID BY SEQUENCING, ORI1T: NORMAL
SPECIMEN SOURCE: NORMAL

## 2021-09-15 PROCEDURE — 3331090002 HH PPS REVENUE DEBIT

## 2021-09-15 PROCEDURE — 3331090001 HH PPS REVENUE CREDIT

## 2021-09-16 ENCOUNTER — HOME CARE VISIT (OUTPATIENT)
Dept: HOME HEALTH SERVICES | Facility: HOME HEALTH | Age: 51
End: 2021-09-16
Payer: MEDICARE

## 2021-09-16 ENCOUNTER — HOME CARE VISIT (OUTPATIENT)
Dept: SCHEDULING | Facility: HOME HEALTH | Age: 51
End: 2021-09-16
Payer: MEDICARE

## 2021-09-16 VITALS
SYSTOLIC BLOOD PRESSURE: 148 MMHG | OXYGEN SATURATION: 95 % | DIASTOLIC BLOOD PRESSURE: 84 MMHG | TEMPERATURE: 97.7 F | HEART RATE: 95 BPM

## 2021-09-16 LAB
AEROBIC ID BY SEQ, ORI2T: NORMAL
BACTERIA ISLT: ABNORMAL
SOURCE, RSRC70: ABNORMAL
SPECIMEN SOURCE: NORMAL

## 2021-09-16 PROCEDURE — G0152 HHCP-SERV OF OT,EA 15 MIN: HCPCS

## 2021-09-16 PROCEDURE — A6212 FOAM DRG <=16 SQ IN W/BORDER: HCPCS

## 2021-09-16 PROCEDURE — G0299 HHS/HOSPICE OF RN EA 15 MIN: HCPCS

## 2021-09-16 PROCEDURE — G0157 HHC PT ASSISTANT EA 15: HCPCS

## 2021-09-16 PROCEDURE — 3331090001 HH PPS REVENUE CREDIT

## 2021-09-16 PROCEDURE — 3331090002 HH PPS REVENUE DEBIT

## 2021-09-16 NOTE — Clinical Note
ASSESSMENT:  Pt demonstrated safe and independent transfers for chair, bed, commode, and Mod independent for tub. He demonstrated safe and independent LE and UE dressing including retrieval of clothing. Pt had full UE ROM and 5/5 strength throughout both UE. Pt demonstrated ability to fix all meals. Pt reports he is at [facility] for [facility]s and IADLs and does not need OT. Pt is discharged from OT services.

## 2021-09-17 VITALS
TEMPERATURE: 97.2 F | SYSTOLIC BLOOD PRESSURE: 128 MMHG | DIASTOLIC BLOOD PRESSURE: 79 MMHG | RESPIRATION RATE: 16 BRPM | HEART RATE: 89 BPM | OXYGEN SATURATION: 98 %

## 2021-09-17 VITALS
TEMPERATURE: 98.2 F | SYSTOLIC BLOOD PRESSURE: 128 MMHG | HEART RATE: 68 BPM | RESPIRATION RATE: 16 BRPM | DIASTOLIC BLOOD PRESSURE: 84 MMHG | OXYGEN SATURATION: 98 %

## 2021-09-17 PROCEDURE — 3331090001 HH PPS REVENUE CREDIT

## 2021-09-17 PROCEDURE — 3331090002 HH PPS REVENUE DEBIT

## 2021-09-17 NOTE — HOME HEALTH
Skilled reason for visit: Patient was recently hospitalized for DM with foot ulcer, requiring observation by a SN for s/s of decomposition or adverse effects resulting from newly prescribed medications. Skilled observation needed to determine if new medication regimen prescribed requires modifications or other therapeutic interventions considered until pt's clinical condition or treatment has stabilized. Caregiver involvement:  Patient's caregiver is his brother. Caregiver assists patient with bathing, dressing, walking, bathroom, meal prep and setup, medication management, grocery shopping, household chores, transportation to MD appointment and home exercise program.  Medications reconciled and all medications are available in the home this visit. The following education was provided regarding medications, medication interactions, and look alike medications:  aspirin delayed-release 81 mg tablet  -  Report medication questions or  problems to [address] or MD.  Medications are effective at this time. Patient states understanding. Patient education provided this visit:  . DELMER Jackson Disease Management: ESRD, HTN, DM, Hypoglycemia  fall precautions, s/s of infection  Goals/teaching progressing. Patient's goal is to have wound healing. Patient has remained free from falls, free from infection; no safety concerns at this time and is ambulating independently with walker. SN to complete education of patient and patient to follow up with Dr Casandra Wu exercise program: PT  Continued need for the following skills: Nursing, PT   Patient and/or caregiver notified and agree to changes in the Plan of Care: No changes  The following discharge planning was discussed with the pt/caregiver:  SN to change patient's dressing to R  foot on next visit and to continue education of patient and discharge patient when teaching and goals are met.     Patient has been trying to get an appt to see Dr Fawn Gautam, but stated he has not been able to get through to the office. SN called Dr Marshal Alejandro office today and made an appt for him for 10/12/21 @ 9:30 am. Patient is aware of the appt time and date.

## 2021-09-17 NOTE — HOME HEALTH
Summary of clinical condition:  Pt was found in his apartment and he was unable to get up off the floor. He was taken to ED and was found to have blood sugar levels in the low 40's. He was confused and admitted to the hospital with Dx of metabolic encephalopathy. He gradually improved and was discharged home on 9/6/2021 with orders for [facility] OT  Medical History: CVA, BKA, Osteomyelitis, DM, CKD, HTN  Medications review completed. No  adverse reactions noted. Caregiver involvement: Pt lives by himself and he orders his food online. Patient education provided this visit:  Pt was educated energy conservation during meal preporation  Home exercise program:None needed    ASSESSMENT:  Pt demonstrated safe and independent transfers for chair, bed, commode, and Mod independent for tub. He demonstrated safe and independent LE and UE dressing including retrieval of clothing. Pt had full UE ROM and 5/5 strength throughout both UE. Pt demonstrated ability to fix all meals. Pt reports he is at [facility] for [facility]s and IADLs and does not need OT. Pt is discharged from OT services.

## 2021-09-18 PROCEDURE — 3331090001 HH PPS REVENUE CREDIT

## 2021-09-18 PROCEDURE — 3331090002 HH PPS REVENUE DEBIT

## 2021-09-19 PROCEDURE — 3331090001 HH PPS REVENUE CREDIT

## 2021-09-19 PROCEDURE — 3331090002 HH PPS REVENUE DEBIT

## 2021-09-20 PROCEDURE — 3331090001 HH PPS REVENUE CREDIT

## 2021-09-20 PROCEDURE — 3331090002 HH PPS REVENUE DEBIT

## 2021-09-20 NOTE — HOME HEALTH
SUBJECTIVE: I'm able to walk around a littlw without the rollator  CAREGIVER ASSISTANCE NEEDED FOR: transportation  ASSESSMENT AND PROGRESS TOWARD GOALS:  Patient demonstrated a positive result to therapy this date as evidenced by tolerating gait training outdoors and seated therex. Progressing well toward goals for gait and strength. Patient continues to have deficits in gait, strength, and endurance. Patient was notably fatigued following gait training this session. Patient will benefit from continued intervention with progression of gait training and therex. CONTINUED NEED FOR THE FOLLOWING SKILLS: HH PT is medically necessary to address pain, decreased ROM, decreased strength, increased swelling, impaired bed mobility, decreased independence with functional transfers, impaired gait, impaired stair negotiation, and impaired balance in order to improve functional independence, quality of life, return to PLOF, reduce the risk for falls, and reduce pain. [address] [place] [address]  LAST COMPLETED ON:  9/16/21  PLAN: 2w1.    DISCHARGE PLANNING DISCUSSED: Discharge to self and family under MD supervision once all goals have been met or patient has reached maximum potential.

## 2021-09-21 ENCOUNTER — HOME CARE VISIT (OUTPATIENT)
Dept: SCHEDULING | Facility: HOME HEALTH | Age: 51
End: 2021-09-21
Payer: MEDICARE

## 2021-09-21 VITALS
TEMPERATURE: 97.4 F | RESPIRATION RATE: 16 BRPM | OXYGEN SATURATION: 96 % | SYSTOLIC BLOOD PRESSURE: 158 MMHG | DIASTOLIC BLOOD PRESSURE: 89 MMHG | HEART RATE: 98 BPM

## 2021-09-21 PROCEDURE — G0157 HHC PT ASSISTANT EA 15: HCPCS

## 2021-09-21 PROCEDURE — 3331090002 HH PPS REVENUE DEBIT

## 2021-09-21 PROCEDURE — G0299 HHS/HOSPICE OF RN EA 15 MIN: HCPCS

## 2021-09-21 PROCEDURE — 3331090001 HH PPS REVENUE CREDIT

## 2021-09-21 NOTE — HOME HEALTH
Skilled reason for visit: Patient was recently hospitalized for DM with foot ulcer, requiring observation by a SN for s/s of decomposition or adverse effects resulting from newly prescribed medications. Skilled observation needed to determine if new medication regimen prescribed requires modifications or other therapeutic interventions considered until pt's clinical condition or treatment has stabilized. Caregiver involvement:  Patient's caregiver is his brother. Caregiver assists patient with bathing, dressing, walking, bathroom, meal prep and setup, medication management, grocery shopping, household chores, transportation to MD appointment and home exercise program.  Medications reconciled and all medications are available in the home this visit. The following education was provided regarding medications, medication interactions, and look alike medications:  sodium hypochlorite 0.0125% ([employer]) - for wound care  -  Report medication questions or  problems to [address] or MD.  Medications are effective at this time. Patient states understanding. Patient education provided this visit:  Lin Buchanan Saqib DewayneCounts include [facility] beds at the [facility] Disease Management: HTN, DM, ESRD, wound care, fall precautions, s/s of infection    Goals/teaching progressing. Patient's goal is to have woud healing. Patient has remained free from falls, free from infection; no safety concerns at this time and is ambulating independently with walker. SN to complete education of patient and patient to follow up with Dr Maranda Turner exercise program: PT  Continued need for the following skills: Nursing, PT   Patient and/or caregiver notified and agree to changes in the Plan of Care: No changes  The following discharge planning was discussed with the pt/caregiver:  SN to continue education of patient and discharge patient when teaching and goals are met.

## 2021-09-22 PROCEDURE — 3331090001 HH PPS REVENUE CREDIT

## 2021-09-22 PROCEDURE — 3331090002 HH PPS REVENUE DEBIT

## 2021-09-23 ENCOUNTER — HOME CARE VISIT (OUTPATIENT)
Dept: SCHEDULING | Facility: HOME HEALTH | Age: 51
End: 2021-09-23
Payer: MEDICARE

## 2021-09-23 PROCEDURE — 3331090002 HH PPS REVENUE DEBIT

## 2021-09-23 PROCEDURE — G0157 HHC PT ASSISTANT EA 15: HCPCS

## 2021-09-23 PROCEDURE — G0299 HHS/HOSPICE OF RN EA 15 MIN: HCPCS

## 2021-09-23 PROCEDURE — 3331090001 HH PPS REVENUE CREDIT

## 2021-09-24 VITALS
HEART RATE: 89 BPM | OXYGEN SATURATION: 98 % | TEMPERATURE: 97.2 F | RESPIRATION RATE: 16 BRPM | DIASTOLIC BLOOD PRESSURE: 90 MMHG | SYSTOLIC BLOOD PRESSURE: 151 MMHG

## 2021-09-24 PROCEDURE — 3331090001 HH PPS REVENUE CREDIT

## 2021-09-24 PROCEDURE — 3331090002 HH PPS REVENUE DEBIT

## 2021-09-24 NOTE — HOME HEALTH
Skilled reason for visit: Patient was recently hospitalized for DM foot ulcer, requiring observation by a SN for s/s of decomposition or adverse effects resulting from newly prescribed medications. Skilled observation needed to determine if new medication regimen prescribed requires modifications or other therapeutic interventions considered until pt's clinical condition or treatment has stabilized. Caregiver involvement:  Patient's caregiver is brother. Caregiver assists patient with bathing, dressing, walking, bathroom, meal prep and setup, medication management, grocery shopping, household chores, transportation to MD appointment and home exercise program.  Medications reconciled and all medications are available in the home this visit. The following education was provided regarding medications, medication interactions, and look alike medications:    -Contact Agency or MD with questions. Medications are effective at this time. Patient states understanding. Patient education provided this visit:  . DELMER Cabral Records Misha Cabral Records Disease Management: Hypoglycemia teaching, HTN, ESRD, fall precautions, s/s of infection  . Mike Diaz... Wound Care:   Keep wound clean, covered and dry, s/s of infection, elevate     Goals/teaching progressing. Patient's goal is to have wound healing. Patient has remained free from falls, free from infection; no safety concerns at this time and is ambulating independently with walker. SN to complete education of patient and patient to follow up with Dr Mary Hernandez exercise program: PT  Continued need for the following skills: Nursing, PT   Patient and/or caregiver notified and agree to changes in the Plan of Care: No changes   The following discharge planning was discussed with the pt/caregiver:  SN to continue education of patient and discharge patient when teaching and goals are met.

## 2021-09-25 PROCEDURE — 3331090002 HH PPS REVENUE DEBIT

## 2021-09-25 PROCEDURE — 3331090001 HH PPS REVENUE CREDIT

## 2021-09-26 VITALS
OXYGEN SATURATION: 96 % | HEART RATE: 96 BPM | TEMPERATURE: 97.5 F | DIASTOLIC BLOOD PRESSURE: 94 MMHG | SYSTOLIC BLOOD PRESSURE: 169 MMHG

## 2021-09-26 PROCEDURE — 3331090002 HH PPS REVENUE DEBIT

## 2021-09-26 PROCEDURE — 3331090001 HH PPS REVENUE CREDIT

## 2021-09-27 PROCEDURE — 3331090002 HH PPS REVENUE DEBIT

## 2021-09-27 PROCEDURE — 3331090001 HH PPS REVENUE CREDIT

## 2021-09-27 NOTE — HOME HEALTH
SUBJECTIVE: I got a new leg over the weekend, i'm still getting used to it  CAREGIVER ASSISTANCE NEEDED FOR: transportation  ASSESSMENT AND PROGRESS TOWARD GOALS:  Patient demonstrated a positive result to therapy this date as evidenced by tolerating gait training and seated therex. Progressing well toward goals for gait and strength. Patient continues to have deficits in gait and strength. Patient demonstrated slower gait speed this session, due to not being sure of new hyrdolic prosthetic. Patient will benefit from continued intervention with progression of gait training and therex. CONTINUED NEED FOR THE FOLLOWING SKILLS:  PT is medically necessary to address  pain, decreased ROM, decreased strength, increased swelling, impaired bed mobility, decreased independence with functional transfers, impaired gait, impaired stair negotiation, and impaired balance in order to improve functional independence, quality of life, return to PLOF, reduce the risk for falls, and reduce pain.    [facility]  LAST COMPLETED ON:  9/21/21  PLAN: 1 additional PT treatment session this week   DISCHARGE PLANNING DISCUSSED: Discharge to self and family under MD supervision once all goals have been met or patient has reached maximum potential.

## 2021-09-27 NOTE — HOME HEALTH
SUBJECTIVE: I feel okay today  CAREGIVER ASSISTANCE NEEDED FOR: transportation  ASSESSMENT AND PROGRESS TOWARD GOALS:  Patient demonstrated a positive result to therapy this date as evidenced by tolerating gait training and seated therex. Progressing well toward goals for gait and strength. Patient continues to have deficits in gait and strenvgth. Patient will benefit from continued intervention with progression of gait training and therex. CONTINUED NEED FOR THE FOLLOWING SKILLS: HH PT is medically necessary to address pain, decreased ROM, decreased strength, increased swelling, impaired bed mobility, decreased independence with functional transfers, impaired gait, impaired stair negotiation, and impaired balance in order to improve functional independence, quality of life, return to PLOF, reduce the risk for falls, and reduce pain.    [facility]  LAST COMPLETED ON:  9/21/21  PLAN: 2w1   DISCHARGE PLANNING DISCUSSED: Discharge to self and family under MD supervision once all goals have been met or patient has reached maximum potential.

## 2021-09-28 ENCOUNTER — HOME CARE VISIT (OUTPATIENT)
Dept: SCHEDULING | Facility: HOME HEALTH | Age: 51
End: 2021-09-28
Payer: MEDICARE

## 2021-09-28 VITALS
TEMPERATURE: 99.6 F | DIASTOLIC BLOOD PRESSURE: 110 MMHG | OXYGEN SATURATION: 96 % | HEART RATE: 95 BPM | SYSTOLIC BLOOD PRESSURE: 194 MMHG

## 2021-09-28 PROCEDURE — 3331090001 HH PPS REVENUE CREDIT

## 2021-09-28 PROCEDURE — 3331090002 HH PPS REVENUE DEBIT

## 2021-09-28 PROCEDURE — G0157 HHC PT ASSISTANT EA 15: HCPCS

## 2021-09-29 PROCEDURE — 3331090001 HH PPS REVENUE CREDIT

## 2021-09-29 PROCEDURE — 3331090002 HH PPS REVENUE DEBIT

## 2021-09-30 ENCOUNTER — HOME CARE VISIT (OUTPATIENT)
Dept: SCHEDULING | Facility: HOME HEALTH | Age: 51
End: 2021-09-30
Payer: MEDICARE

## 2021-09-30 VITALS
TEMPERATURE: 98.2 F | DIASTOLIC BLOOD PRESSURE: 88 MMHG | SYSTOLIC BLOOD PRESSURE: 136 MMHG | HEART RATE: 100 BPM | OXYGEN SATURATION: 99 %

## 2021-09-30 PROCEDURE — 3331090002 HH PPS REVENUE DEBIT

## 2021-09-30 PROCEDURE — G0157 HHC PT ASSISTANT EA 15: HCPCS

## 2021-09-30 PROCEDURE — 3331090001 HH PPS REVENUE CREDIT

## 2021-09-30 PROCEDURE — G0299 HHS/HOSPICE OF RN EA 15 MIN: HCPCS

## 2021-09-30 NOTE — HOME HEALTH
SUBJECTIVE: I've been doing goodt  CAREGIVER ASSISTANCE NEEDED FOR: transportation  ASSESSMENT AND PROGRESS TOWARD GOALS:  Patient demonstrated a negative result to therapy this date as evidenced by minimal therapeutic activities performed this session due to elevated blood pressure. Progressing fair toward goals for gait. Patient continues to have deficits in gait and strength. Patient will benefit from continued intervention with progression of gait training and therex. Patient reported that blood pressure is elevated this session due to pt not taking BP meds yet today. Therapist witnessed pt take PO blood pressure medication. Patient educated to retake blood pressure in 1 hour and to contact MD if remains elevated and/ or if dizziness or lightheadedness occurs. CONTINUED NEED FOR THE FOLLOWING SKILLS: HH PT is medically necessary to address pain, decreased ROM, decreased strength, increased swelling, impaired bed mobility, decreased independence with functional transfers, impaired gait, impaired stair negotiation, and impaired balance in order to improve functional independence, quality of life, return to PLOF, reduce the risk for falls, and reduce pain. Ag LAST COMPLETED ON:  9/28/21  PLAN: 1 additional PT treatment session this week.    DISCHARGE PLANNING DISCUSSED: Discharge to self and family under MD supervision once all goals have been met or patient has reached maximum potential.

## 2021-10-01 PROCEDURE — 3331090001 HH PPS REVENUE CREDIT

## 2021-10-01 PROCEDURE — 3331090002 HH PPS REVENUE DEBIT

## 2021-10-02 PROCEDURE — 3331090001 HH PPS REVENUE CREDIT

## 2021-10-02 PROCEDURE — 3331090002 HH PPS REVENUE DEBIT

## 2021-10-03 PROCEDURE — 3331090001 HH PPS REVENUE CREDIT

## 2021-10-03 PROCEDURE — 3331090002 HH PPS REVENUE DEBIT

## 2021-10-04 VITALS
TEMPERATURE: 97.8 F | DIASTOLIC BLOOD PRESSURE: 70 MMHG | HEART RATE: 72 BPM | SYSTOLIC BLOOD PRESSURE: 126 MMHG | OXYGEN SATURATION: 94 %

## 2021-10-04 PROCEDURE — 3331090002 HH PPS REVENUE DEBIT

## 2021-10-04 PROCEDURE — 3331090001 HH PPS REVENUE CREDIT

## 2021-10-04 NOTE — HOME HEALTH
Skilled reason for visit: Skilled assessment, wound care  Caregiver involvement: Patient clean, dressed and well nourished. Home neat and clean. Medications reconciled and all medications are available in the home this visit. Medications  are effective at this time. Home health supplies by type and quantity ordered/delivered this visit include: None this visit  Patient education provided this visit: Medication  Progress toward goals: Progressing  Home exercise program: Participating  Continued need for the following skills: Nursing  The following discharge planning was discussed with the pt/caregiver:  Will d/c to home/self care when goals are met  Wound care provided as prescribed and documented

## 2021-10-05 ENCOUNTER — HOME CARE VISIT (OUTPATIENT)
Dept: SCHEDULING | Facility: HOME HEALTH | Age: 51
End: 2021-10-05
Payer: MEDICARE

## 2021-10-05 VITALS
TEMPERATURE: 97.8 F | RESPIRATION RATE: 16 BRPM | SYSTOLIC BLOOD PRESSURE: 163 MMHG | DIASTOLIC BLOOD PRESSURE: 95 MMHG | HEART RATE: 94 BPM | OXYGEN SATURATION: 96 %

## 2021-10-05 VITALS
RESPIRATION RATE: 16 BRPM | TEMPERATURE: 98.7 F | DIASTOLIC BLOOD PRESSURE: 85 MMHG | HEART RATE: 92 BPM | SYSTOLIC BLOOD PRESSURE: 160 MMHG | OXYGEN SATURATION: 98 %

## 2021-10-05 PROCEDURE — 3331090002 HH PPS REVENUE DEBIT

## 2021-10-05 PROCEDURE — 3331090001 HH PPS REVENUE CREDIT

## 2021-10-05 PROCEDURE — G0151 HHCP-SERV OF PT,EA 15 MIN: HCPCS

## 2021-10-05 PROCEDURE — G0299 HHS/HOSPICE OF RN EA 15 MIN: HCPCS

## 2021-10-05 NOTE — HOME HEALTH
Skilled reason for visit: Patient was recently hospitalized for DM foot ulcer, requiring observation by a SN for s/s of decomposition or adverse effects resulting from newly prescribed medications. Skilled observation needed to determine if new medication regimen prescribed requires modifications or other therapeutic interventions considered until pt's clinical condition or treatment has stabilized. Caregiver involvement:  Patient's caregiver is his brother Caregiver assists patient with bathing, dressing, walking, bathroom, meal prep and setup, medication management, grocery shopping, household chores, transportation to MD appointment and home exercise program.  Medications reconciled and all medications are available in the home this visit. The following education was provided regarding medications, medication interactions, and look alike medications:  hydrALAZINE (APRESOLINE) 25 mg tablet  -Contact Agency or MD with questions. Medications are effective at this time. Patient states understanding. Patient education provided this visit:  Lin Oliver Disease Management: DM, ESRD, HTN, wound care teaching, constipation prevention, fall precautions, s/s of infection    Goals/teaching progressing. Patient's goal is to have wound healing. Patient has remained free from falls, free from infection; no safety concerns at this time and is ambulating independently with. SN to complete education of patient and patient to follow up with Dr Rubio Deal exercise program:   Continued need for the following skills: Nursing  Patient and/or caregiver notified and agree to changes in the Plan of Care: No changes  The following discharge planning was discussed with the pt/caregiver:  SN to continue education of patient and discharge patient when teaching and goals are met.

## 2021-10-05 NOTE — Clinical Note
Romulo Amin received skilled PT, OT and RN s/p hospitalization for DM2 and foot ulcer. This patient has currently met all goals and has been discharged to a [facility] under the care and supervision of his PCP at this time. The following goals have been met: pt is ambulating >300 ft with a 4WW, MI, is independent with transfers and a daily and scored 21/28 on the Tinetti. Patient verbalized understanding of all discharge instructions and is in agreement with discharge this visit.

## 2021-10-06 PROCEDURE — 3331090002 HH PPS REVENUE DEBIT

## 2021-10-06 PROCEDURE — 3331090001 HH PPS REVENUE CREDIT

## 2021-10-07 ENCOUNTER — HOME CARE VISIT (OUTPATIENT)
Dept: SCHEDULING | Facility: HOME HEALTH | Age: 51
End: 2021-10-07
Payer: MEDICARE

## 2021-10-07 VITALS
RESPIRATION RATE: 16 BRPM | OXYGEN SATURATION: 99 % | TEMPERATURE: 96.9 F | HEART RATE: 100 BPM | DIASTOLIC BLOOD PRESSURE: 90 MMHG | SYSTOLIC BLOOD PRESSURE: 159 MMHG

## 2021-10-07 PROCEDURE — 400018 HH-NO PAY CLAIM PROCEDURE

## 2021-10-07 PROCEDURE — A6212 FOAM DRG <=16 SQ IN W/BORDER: HCPCS

## 2021-10-07 PROCEDURE — 3331090001 HH PPS REVENUE CREDIT

## 2021-10-07 PROCEDURE — 3331090002 HH PPS REVENUE DEBIT

## 2021-10-07 PROCEDURE — G0299 HHS/HOSPICE OF RN EA 15 MIN: HCPCS

## 2021-10-07 PROCEDURE — A9270 NON-COVERED ITEM OR SERVICE: HCPCS

## 2021-10-07 PROCEDURE — 400013 HH SOC

## 2021-10-07 NOTE — HOME HEALTH
S:Pt stated he was doing a lot better and reports 75% overall subjective improvement rating. O: PAIN: Pt denies pain. WOUND: See RN notes. ROM: no impairments noted   STRENGTH:Patient demonstrates decreased muscle strength as follows:  R hip flex 4/5  R hip abd 4/5  R hip add 4/5  R hip ext 4/5  R knee flex 4/5  R knee ext 4/5  R ankle DF na/5  L hip flex 4/5  L hip abd 4/5  L hip ext 4/5  L hip add 4/5  L knee flex 4/5  L knee ext 4/5  L ankle DF 4/5   BED MOBILITY: independent   EQUIPMENT: 4WW, R prosthesis  TRANSFERS: MI  GAIT: >300 ft with a 4WW, MI.  STEPS: x 1 step to exit home, MI   BALANCE: Tinetti 21/28    A:ASSESSMENT AND PROGRESS TOWARD GOALS:  Tawny Mejia received skilled PT, OT and RN s/p hospitalization for DM2 and foot ulcer. This patient has currently met all goals and has been discharged to a [facility] under the care and supervision of his PCP at this time. The following goals have been met: pt is ambulating >300 ft with a 4WW, MI, is independent with transfers and a daily and scored 21/28 on the Tinetti. Patient verbalized understanding of all discharge instructions and is in agreement with discharge this visit.      P:DC

## 2021-10-07 NOTE — HOME HEALTH
Skilled reason for visit: Patient was recently hospitalized for DM foot ulcer, requiring observation by a SN for s/s of decomposition or adverse effects resulting from newly prescribed medications. Skilled observation needed to determine if new medication regimen prescribed requires modifications or other therapeutic interventions considered until pt's clinical condition or treatment has stabilized. Caregiver involvement:  Patient's caregiver is family. Caregiver assists patient with bathing, dressing, walking, bathroom, meal prep and setup, medication management, grocery shopping, household chores, transportation to MD appointment and home exercise program.  Medications reconciled and all medications are available in the home this visit. The following education was provided regarding medications, medication interactions, and look alike medications:  hydrALAZINE (APRESOLINE) 25 mg tablet  -Contact Agency or MD with questions. Medications are effective at this time. Patient states understanding. Patient education provided this visit:  . DELMER Reece Disease Management: Wound teaching, fall precautions, s/s of infection    Goals/teaching progressing. Patient's goal is to have wound healing. Patient has remained free from falls, free from infection; no safety concerns at this time and is ambulating independently with. SN to complete education of patient and patient to follow up with Dr Bruce Garcia exercise program:   Continued need for the following skills: Nursing  Patient and/or caregiver notified and agree to changes in the Plan of Care: YES - Patient notified of final discharge on next SN visit.  5 day notification of DC was discussed with patient/cg. Patient/CG signed [facility] was signed on 9/30/2021. The following discharge planning was discussed with the pt/caregiver:  SN to continue education of patient and discharge patient when teaching and goals are met.

## 2021-10-08 PROCEDURE — 3331090002 HH PPS REVENUE DEBIT

## 2021-10-08 PROCEDURE — 3331090001 HH PPS REVENUE CREDIT

## 2021-10-09 PROCEDURE — 3331090001 HH PPS REVENUE CREDIT

## 2021-10-09 PROCEDURE — 3331090002 HH PPS REVENUE DEBIT

## 2021-10-10 PROCEDURE — 3331090001 HH PPS REVENUE CREDIT

## 2021-10-10 PROCEDURE — 3331090002 HH PPS REVENUE DEBIT

## 2021-10-11 PROCEDURE — 3331090002 HH PPS REVENUE DEBIT

## 2021-10-11 PROCEDURE — 3331090001 HH PPS REVENUE CREDIT

## 2021-10-12 ENCOUNTER — TRANSCRIBE ORDER (OUTPATIENT)
Dept: SCHEDULING | Age: 51
End: 2021-10-12

## 2021-10-12 ENCOUNTER — HOME CARE VISIT (OUTPATIENT)
Dept: SCHEDULING | Facility: HOME HEALTH | Age: 51
End: 2021-10-12
Payer: MEDICARE

## 2021-10-12 DIAGNOSIS — M86.172 ACUTE OSTEOMYELITIS OF LEFT ANKLE OR FOOT (HCC): Primary | ICD-10-CM

## 2021-10-12 PROCEDURE — 3331090001 HH PPS REVENUE CREDIT

## 2021-10-12 PROCEDURE — 3331090002 HH PPS REVENUE DEBIT

## 2021-10-13 PROCEDURE — 3331090001 HH PPS REVENUE CREDIT

## 2021-10-13 PROCEDURE — 3331090002 HH PPS REVENUE DEBIT

## 2021-10-14 ENCOUNTER — HOME CARE VISIT (OUTPATIENT)
Dept: HOME HEALTH SERVICES | Facility: HOME HEALTH | Age: 51
End: 2021-10-14
Payer: MEDICARE

## 2021-10-14 PROCEDURE — 3331090001 HH PPS REVENUE CREDIT

## 2021-10-14 PROCEDURE — 3331090002 HH PPS REVENUE DEBIT

## 2021-10-15 PROCEDURE — 3331090001 HH PPS REVENUE CREDIT

## 2021-10-15 PROCEDURE — 3331090002 HH PPS REVENUE DEBIT

## 2021-10-16 ENCOUNTER — HOME CARE VISIT (OUTPATIENT)
Dept: SCHEDULING | Facility: HOME HEALTH | Age: 51
End: 2021-10-16
Payer: MEDICARE

## 2021-10-16 PROCEDURE — 3331090002 HH PPS REVENUE DEBIT

## 2021-10-16 PROCEDURE — 3331090001 HH PPS REVENUE CREDIT

## 2021-10-16 NOTE — HOME HEALTH
The visit was missed due to the following reason(s): patient called several times throughout the day, no answer. Several voicemails left with no returned call.  notified, visit will be missed visit. SN to place order for 1w1 for next week for discharge.

## 2021-10-17 PROCEDURE — 3331090002 HH PPS REVENUE DEBIT

## 2021-10-17 PROCEDURE — 3331090001 HH PPS REVENUE CREDIT

## 2021-10-18 PROCEDURE — 3331090002 HH PPS REVENUE DEBIT

## 2021-10-18 PROCEDURE — 3331090001 HH PPS REVENUE CREDIT

## 2021-10-19 ENCOUNTER — HOME CARE VISIT (OUTPATIENT)
Dept: SCHEDULING | Facility: HOME HEALTH | Age: 51
End: 2021-10-19
Payer: MEDICARE

## 2021-10-19 PROCEDURE — 3331090003 HH PPS REVENUE ADJ

## 2021-10-19 PROCEDURE — 3331090002 HH PPS REVENUE DEBIT

## 2021-10-19 PROCEDURE — 3331090001 HH PPS REVENUE CREDIT

## 2021-10-19 NOTE — HOME HEALTH
Multiple attempts made to try and reach patient for SN Chrisman discharge with no answer and no call back. SN Chrisman d/c from last SN visit. Dr Quincy Vogt notified of discharge.

## 2021-10-20 PROCEDURE — 3331090001 HH PPS REVENUE CREDIT

## 2021-10-20 PROCEDURE — 3331090002 HH PPS REVENUE DEBIT

## 2022-01-16 ENCOUNTER — HOSPITAL ENCOUNTER (EMERGENCY)
Age: 52
Discharge: HOME | End: 2022-01-17
Attending: EMERGENCY MEDICINE
Payer: MEDICARE

## 2022-01-16 ENCOUNTER — APPOINTMENT (OUTPATIENT)
Dept: GENERAL RADIOLOGY | Age: 52
End: 2022-01-16
Attending: EMERGENCY MEDICINE
Payer: MEDICARE

## 2022-01-16 DIAGNOSIS — Z99.2 ESRD ON DIALYSIS (HCC): ICD-10-CM

## 2022-01-16 DIAGNOSIS — N18.6 ESRD ON DIALYSIS (HCC): ICD-10-CM

## 2022-01-16 DIAGNOSIS — E87.5 ACUTE HYPERKALEMIA: ICD-10-CM

## 2022-01-16 DIAGNOSIS — J81.0 ACUTE PULMONARY EDEMA (HCC): Primary | ICD-10-CM

## 2022-01-16 LAB
ALBUMIN SERPL-MCNC: 3.7 G/DL (ref 3.4–5)
ALBUMIN/GLOB SERPL: 0.8 {RATIO} (ref 0.8–1.7)
ALP SERPL-CCNC: 92 U/L (ref 45–117)
ALT SERPL-CCNC: 18 U/L (ref 16–61)
ANION GAP SERPL CALC-SCNC: 19 MMOL/L (ref 3–18)
AST SERPL-CCNC: 9 U/L (ref 10–38)
BASOPHILS # BLD: 0 K/UL (ref 0–0.1)
BASOPHILS NFR BLD: 0 % (ref 0–2)
BILIRUB SERPL-MCNC: 0.5 MG/DL (ref 0.2–1)
BUN SERPL-MCNC: 164 MG/DL (ref 7–18)
BUN/CREAT SERPL: 6 (ref 12–20)
CALCIUM SERPL-MCNC: 7.6 MG/DL (ref 8.5–10.1)
CHLORIDE SERPL-SCNC: 109 MMOL/L (ref 100–111)
CO2 SERPL-SCNC: 12 MMOL/L (ref 21–32)
COVID-19 RAPID TEST, COVR: NOT DETECTED
CREAT SERPL-MCNC: 27.2 MG/DL (ref 0.6–1.3)
DIFFERENTIAL METHOD BLD: ABNORMAL
EOSINOPHIL # BLD: 0.1 K/UL (ref 0–0.4)
EOSINOPHIL NFR BLD: 1 % (ref 0–5)
ERYTHROCYTE [DISTWIDTH] IN BLOOD BY AUTOMATED COUNT: 19.3 % (ref 11.6–14.5)
GLOBULIN SER CALC-MCNC: 4.7 G/DL (ref 2–4)
GLUCOSE BLD STRIP.AUTO-MCNC: 34 MG/DL (ref 70–110)
GLUCOSE BLD STRIP.AUTO-MCNC: 36 MG/DL (ref 70–110)
GLUCOSE BLD STRIP.AUTO-MCNC: 46 MG/DL (ref 70–110)
GLUCOSE BLD STRIP.AUTO-MCNC: 50 MG/DL (ref 70–110)
GLUCOSE SERPL-MCNC: 82 MG/DL (ref 74–99)
HCT VFR BLD AUTO: 29.3 % (ref 36–48)
HGB BLD-MCNC: 9.9 G/DL (ref 13–16)
IMM GRANULOCYTES # BLD AUTO: 0 K/UL (ref 0–0.04)
IMM GRANULOCYTES NFR BLD AUTO: 0 % (ref 0–0.5)
LYMPHOCYTES # BLD: 0.3 K/UL (ref 0.9–3.6)
LYMPHOCYTES NFR BLD: 4 % (ref 21–52)
MCH RBC QN AUTO: 27.3 PG (ref 24–34)
MCHC RBC AUTO-ENTMCNC: 33.8 G/DL (ref 31–37)
MCV RBC AUTO: 80.7 FL (ref 78–100)
MONOCYTES # BLD: 0.5 K/UL (ref 0.05–1.2)
MONOCYTES NFR BLD: 8 % (ref 3–10)
NEUTS SEG # BLD: 5.8 K/UL (ref 1.8–8)
NEUTS SEG NFR BLD: 87 % (ref 40–73)
NRBC # BLD: 0.02 K/UL (ref 0–0.01)
NRBC BLD-RTO: 0.3 PER 100 WBC
PLATELET # BLD AUTO: 120 K/UL (ref 135–420)
PLATELET COMMENTS,PCOM: ABNORMAL
POTASSIUM SERPL-SCNC: 7.9 MMOL/L (ref 3.5–5.5)
PROT SERPL-MCNC: 8.4 G/DL (ref 6.4–8.2)
RBC # BLD AUTO: 3.63 M/UL (ref 4.35–5.65)
RBC MORPH BLD: ABNORMAL
SODIUM SERPL-SCNC: 140 MMOL/L (ref 136–145)
SOURCE, COVRS: NORMAL
WBC # BLD AUTO: 6.7 K/UL (ref 4.6–13.2)

## 2022-01-16 PROCEDURE — 74011636637 HC RX REV CODE- 636/637: Performed by: EMERGENCY MEDICINE

## 2022-01-16 PROCEDURE — 86706 HEP B SURFACE ANTIBODY: CPT

## 2022-01-16 PROCEDURE — 74011000250 HC RX REV CODE- 250: Performed by: EMERGENCY MEDICINE

## 2022-01-16 PROCEDURE — 74011250636 HC RX REV CODE- 250/636: Performed by: EMERGENCY MEDICINE

## 2022-01-16 PROCEDURE — 71045 X-RAY EXAM CHEST 1 VIEW: CPT

## 2022-01-16 PROCEDURE — 87340 HEPATITIS B SURFACE AG IA: CPT

## 2022-01-16 PROCEDURE — 80053 COMPREHEN METABOLIC PANEL: CPT

## 2022-01-16 PROCEDURE — 74011000250 HC RX REV CODE- 250

## 2022-01-16 PROCEDURE — 99285 EMERGENCY DEPT VISIT HI MDM: CPT

## 2022-01-16 PROCEDURE — 85025 COMPLETE CBC W/AUTO DIFF WBC: CPT

## 2022-01-16 PROCEDURE — 96375 TX/PRO/DX INJ NEW DRUG ADDON: CPT

## 2022-01-16 PROCEDURE — 96374 THER/PROPH/DIAG INJ IV PUSH: CPT

## 2022-01-16 PROCEDURE — 90935 HEMODIALYSIS ONE EVALUATION: CPT

## 2022-01-16 PROCEDURE — 74011250637 HC RX REV CODE- 250/637: Performed by: EMERGENCY MEDICINE

## 2022-01-16 PROCEDURE — 82962 GLUCOSE BLOOD TEST: CPT

## 2022-01-16 PROCEDURE — G0257 UNSCHED DIALYSIS ESRD PT HOS: HCPCS

## 2022-01-16 PROCEDURE — 93005 ELECTROCARDIOGRAM TRACING: CPT

## 2022-01-16 PROCEDURE — 87635 SARS-COV-2 COVID-19 AMP PRB: CPT

## 2022-01-16 RX ORDER — HYDRALAZINE HYDROCHLORIDE 20 MG/ML
20 INJECTION INTRAMUSCULAR; INTRAVENOUS
Status: COMPLETED | OUTPATIENT
Start: 2022-01-16 | End: 2022-01-16

## 2022-01-16 RX ORDER — DEXTROSE 25 % IN WATER 25 %
SYRINGE (ML) INTRAVENOUS
Status: COMPLETED
Start: 2022-01-16 | End: 2022-01-16

## 2022-01-16 RX ORDER — DEXTROSE 50 % IN WATER (D50W) INTRAVENOUS SYRINGE
25
Status: COMPLETED | OUTPATIENT
Start: 2022-01-16 | End: 2022-01-16

## 2022-01-16 RX ORDER — SODIUM BICARBONATE 1 MEQ/ML
50 SYRINGE (ML) INTRAVENOUS ONCE
Status: COMPLETED | OUTPATIENT
Start: 2022-01-16 | End: 2022-01-16

## 2022-01-16 RX ORDER — ALBUTEROL SULFATE 0.83 MG/ML
10 SOLUTION RESPIRATORY (INHALATION)
Status: COMPLETED | OUTPATIENT
Start: 2022-01-16 | End: 2022-01-16

## 2022-01-16 RX ORDER — CALCIUM GLUCONATE 94 MG/ML
1 INJECTION, SOLUTION INTRAVENOUS
Status: COMPLETED | OUTPATIENT
Start: 2022-01-16 | End: 2022-01-16

## 2022-01-16 RX ORDER — DIPHENHYDRAMINE HYDROCHLORIDE 50 MG/ML
25 INJECTION, SOLUTION INTRAMUSCULAR; INTRAVENOUS ONCE
Status: COMPLETED | OUTPATIENT
Start: 2022-01-16 | End: 2022-01-16

## 2022-01-16 RX ADMIN — DIPHENHYDRAMINE HYDROCHLORIDE 25 MG: 50 INJECTION, SOLUTION INTRAMUSCULAR; INTRAVENOUS at 20:16

## 2022-01-16 RX ADMIN — SODIUM ZIRCONIUM CYCLOSILICATE 10 G: 5 POWDER, FOR SUSPENSION ORAL at 17:55

## 2022-01-16 RX ADMIN — DEXTROSE MONOHYDRATE 10 ML: 250 INJECTION, SOLUTION INTRAVENOUS at 23:46

## 2022-01-16 RX ADMIN — HYDRALAZINE HYDROCHLORIDE 20 MG: 20 INJECTION INTRAMUSCULAR; INTRAVENOUS at 23:49

## 2022-01-16 RX ADMIN — DEXTROSE MONOHYDRATE 25 G: 25 INJECTION, SOLUTION INTRAVENOUS at 17:54

## 2022-01-16 RX ADMIN — CALCIUM GLUCONATE 1 G: 98 INJECTION, SOLUTION INTRAVENOUS at 17:54

## 2022-01-16 RX ADMIN — INSULIN HUMAN 10 UNITS: 100 INJECTION, SOLUTION PARENTERAL at 17:56

## 2022-01-16 RX ADMIN — ALBUTEROL SULFATE 10 MG: 2.5 SOLUTION RESPIRATORY (INHALATION) at 17:54

## 2022-01-16 RX ADMIN — SODIUM BICARBONATE 50 MEQ: 84 INJECTION, SOLUTION INTRAVENOUS at 17:54

## 2022-01-16 NOTE — ED NOTES
Pt up to bathroom with use of walker, went in bathroom for bowel movement; Pt with steady gait with walker/prosthesis;   Pt states uses walker at home

## 2022-01-16 NOTE — ED TRIAGE NOTES
Patient arrived with EMS with c/o cough , sore throat . Patient states has taste of blood in mouth.  Patient report missed HD on Friday due to covid sx endorses shortness of breath

## 2022-01-16 NOTE — PROGRESS NOTES
Brief Note:    He is 54 yrs male with ESRD on HD MWF ( non compliant with dialysis ) who mostly misses dialysis despite knowing the complication here again after missing dialysis    Discussed with Dr Nadiya Garner , per him he is short of breath .  CXR suggestive of pulmonary edema and Hyperkalemia    Informed dialysis nurse Ms Abdirahman Nichols  for urgent dialysis for him this evening   Advise to keep him in tele monitoring  Insulin/dextrose every 2 hours until dialysis started along with iv lasix 120 mg iv once  - Lokelam 10 mg once and albuterol nebs and 1 amp of sodium bicarbonate    Hypertension: Labetalol 20 mg iv prn to bring systolic BP less than 649 mmhg    Full consult to follow in morning  Next dialysis in morning in regular day    [address]- Nephrology

## 2022-01-16 NOTE — ED PROVIDER NOTES
EMERGENCY DEPARTMENT HISTORY AND PHYSICAL EXAM    Date: 1/16/2022  Patient Name: Juan Alberto Chinchilla. History of Presenting Illness     Chief Complaint   Patient presents with    Shortness of Breath       History Provided By: Patient and EMS     History Luke Pisano):   2:49 PM  Juan Alberto Vital is a 54 y.o. male with PMHX of ESRD (M/W/F) who presents to the emergency department by EMS C/O dyspnea onset 4 days ago. Associated sxs include cough, coughing up small blood clots. Pt denies fever, chills or any other sxs or complaints. Pt states last dialysis was 7 Jan 21 (but told triage nurse that he only missed the 12th). Chief Complaint: dyspnea  Onset: 4 days ago   Timing:  acute  Context: Symptoms started spontaneously, symptoms have rapidly worsened since onset  Location: lungs  Quality: Tightness  Severity: Moderate  Modifying Factors: Nothing makes it better, or worse. Associated Symptoms: cough, coughing up small clots of blood    PCP: Shanta Jordan MD     Current Facility-Administered Medications   Medication Dose Route Frequency Provider Last Rate Last Admin    benzocaine-menthoL (CEPACOL) lozenge 1 Lozenge  1 Lozenge Mucous Membrane PRN Nadya Ortiz MD        diphenhydrAMINE (BENADRYL) injection 25 mg  25 mg IntraVENous ONCE Nadya Ortiz MD         Current Outpatient Medications   Medication Sig Dispense Refill    aspirin delayed-release 81 mg tablet Take 1 Tablet by mouth daily. 30 Tablet 0    doxazosin (CARDURA) 4 mg tablet Take 2 mg by mouth daily.  sodium hypochlorite 0.0125% (DAKINS SOLUTION) Apply 1 mL to affected area two (2) times a week.  omeprazole (PRILOSEC) 40 mg capsule Take 40 mg by mouth daily.  sevelamer carbonate (RENVELA) 800 mg tab tab Take 800 mg by mouth three (3) times daily (with meals).  sodium bicarbonate 650 mg tablet Take 650 mg by mouth three (3) times daily.  amLODIPine (NORVASC) 10 mg tablet Take 1 Tab by mouth daily.  30 Tab 0    carvedilol (COREG) 25 mg tablet Take 1 Tab by mouth two (2) times daily (with meals). 60 Tab 0    hydrALAZINE (APRESOLINE) 25 mg tablet Take 1 Tab by mouth three (3) times daily. (Patient taking differently: Take 50 mg by mouth two (2) times a day.) 90 Tab 0    isosorbide mononitrate ER (IMDUR) 60 mg CR tablet Take 1 Tab by mouth daily. 30 Tab 0    furosemide (LASIX) 80 mg tablet Take 1 Tab by mouth two (2) times a day. 61 Tab 0       Past History         Past Medical History:  Past Medical History:   Diagnosis Date    Chronic kidney disease     Diabetes ([facility]     Heart failure ([facility]     Hypertension     Sickle cell trait ([facility]        Past Surgical History:  Past Surgical History:   Procedure Laterality Date    HX ORTHOPAEDIC      right BKA 2017       Family History:  No family history on file. Reviewed and non-contributory    Social History:  Social History     Tobacco Use    Smoking status: Former Smoker    Smokeless tobacco: Never Used   Substance Use Topics    Alcohol use: No    Drug use: No       Allergies: Allergies   Allergen Reactions    Penicillins Rash    Vancomycin Other (comments)     kayla syndrome         Review of Systems      Review of Systems   Constitutional: Negative for chills and fever. HENT: Negative for rhinorrhea and sore throat. Eyes: Negative for pain and visual disturbance. Respiratory: Positive for chest tightness and shortness of breath. Negative for wheezing. Cardiovascular: Negative for chest pain and palpitations. Gastrointestinal: Negative for abdominal pain, diarrhea, nausea and vomiting. Musculoskeletal: Negative for arthralgias and myalgias. Skin: Negative for rash and wound. Neurological: Negative for speech difficulty, light-headedness and headaches. Psychiatric/Behavioral: Negative for agitation and confusion. All other systems reviewed and are negative.       Physical Exam     Vitals:    01/16/22 1754 01/16/22 1819 01/16/22 1827 01/16/22 1843   BP: (!) 160/112 (!) 167/102 (!) 209/102 (!) 218/91   Pulse: 93  93 95   Resp:   26 18   Temp:       SpO2:           Physical Exam  Vitals and nursing note reviewed. Constitutional:       General: He is not in acute distress. Appearance: Normal appearance. He is normal weight. He is not ill-appearing. HENT:      Head: Normocephalic and atraumatic. Nose: Nose normal. No rhinorrhea. Mouth/Throat:      Mouth: Mucous membranes are moist.      Pharynx: No oropharyngeal exudate or posterior oropharyngeal erythema. Eyes:      Extraocular Movements: Extraocular movements intact. Conjunctiva/sclera: Conjunctivae normal.      Pupils: Pupils are equal, round, and reactive to light. Cardiovascular:      Rate and Rhythm: Normal rate and regular rhythm. Heart sounds: No murmur heard. No friction rub. No gallop. Pulmonary:      Effort: Accessory muscle usage present. No respiratory distress. Breath sounds: Examination of the right-upper field reveals rales. Examination of the left-upper field reveals rales. Examination of the right-middle field reveals rales. Examination of the left-middle field reveals rales. Examination of the right-lower field reveals rales. Examination of the left-lower field reveals rales. Rales present. No wheezing or rhonchi. Abdominal:      General: Bowel sounds are normal.      Palpations: Abdomen is soft. Tenderness: There is no abdominal tenderness. There is no guarding or rebound. Musculoskeletal:         General: No swelling, tenderness or deformity. Normal range of motion. Cervical back: Normal range of motion and neck supple. No rigidity. Right Lower Extremity: Right leg is amputated below knee. Lymphadenopathy:      Cervical: No cervical adenopathy. Skin:     General: Skin is warm and dry. Findings: No rash. Neurological:      General: No focal deficit present.       Mental Status: He is alert and oriented to person, place, and time. Psychiatric:         Mood and Affect: Mood normal.         Behavior: Behavior normal.         Diagnostic Study Results     Labs -     Recent Results (from the past 12 hour(s))   CBC WITH AUTOMATED DIFF    Collection Time: 01/16/22  3:15 PM   Result Value Ref Range    WBC 6.7 4.6 - 13.2 K/uL    RBC 3.63 (L) 4.35 - 5.65 M/uL    HGB 9.9 (L) 13.0 - 16.0 g/dL    HCT 29.3 (L) 36.0 - 48.0 %    MCV 80.7 78.0 - 100.0 FL    MCH 27.3 24.0 - 34.0 PG    MCHC 33.8 31.0 - 37.0 g/dL    RDW 19.3 (H) 11.6 - 14.5 %    PLATELET 588 (L) 732 - 420 K/uL    NRBC 0.3 (H) 0  WBC    ABSOLUTE NRBC 0.02 (H) 0.00 - 0.01 K/uL    NEUTROPHILS 87 (H) 40 - 73 %    LYMPHOCYTES 4 (L) 21 - 52 %    MONOCYTES 8 3 - 10 %    EOSINOPHILS 1 0 - 5 %    BASOPHILS 0 0 - 2 %    IMMATURE GRANULOCYTES 0 0.0 - 0.5 %    ABS. NEUTROPHILS 5.8 1.8 - 8.0 K/UL    ABS. LYMPHOCYTES 0.3 (L) 0.9 - 3.6 K/UL    ABS. MONOCYTES 0.5 0.05 - 1.2 K/UL    ABS. EOSINOPHILS 0.1 0.0 - 0.4 K/UL    ABS. BASOPHILS 0.0 0.0 - 0.1 K/UL    ABS. IMM.  GRANS. 0.0 0.00 - 0.04 K/UL    DF AUTOMATED      PLATELET COMMENTS LARGE PLATELETS      RBC COMMENTS POLYCHROMASIA  1+        RBC COMMENTS OVALOCYTES  1+        RBC COMMENTS ANISOCYTOSIS  2+        RBC COMMENTS TEARDROP CELLS  OCCASIONAL  TARGET CELLS  1+        RBC COMMENTS SCHISTOCYTES  OCCASIONAL       METABOLIC PANEL, COMPREHENSIVE    Collection Time: 01/16/22  3:15 PM   Result Value Ref Range    Sodium 140 136 - 145 mmol/L    Potassium 7.9 (HH) 3.5 - 5.5 mmol/L    Chloride 109 100 - 111 mmol/L    CO2 12 (L) 21 - 32 mmol/L    Anion gap 19 (H) 3.0 - 18 mmol/L    Glucose 82 74 - 99 mg/dL     (H) 7.0 - 18 MG/DL    Creatinine 27.20 (H) 0.6 - 1.3 MG/DL    BUN/Creatinine ratio 6 (L) 12 - 20      GFR est AA 2 (L) >60 ml/min/1.73m2    GFR est non-AA 2 (L) >60 ml/min/1.73m2    Calcium 7.6 (L) 8.5 - 10.1 MG/DL    Bilirubin, total 0.5 0.2 - 1.0 MG/DL    ALT (SGPT) 18 16 - 61 U/L    AST (SGOT) 9 (L) 10 - 38 U/L Alk. phosphatase 92 45 - 117 U/L    Protein, total 8.4 (H) 6.4 - 8.2 g/dL    Albumin 3.7 3.4 - 5.0 g/dL    Globulin 4.7 (H) 2.0 - 4.0 g/dL    A-G Ratio 0.8 0.8 - 1.7     COVID-19 RAPID TEST    Collection Time: 01/16/22  3:15 PM   Result Value Ref Range    Specimen source Nasopharyngeal      COVID-19 rapid test Not detected NOTD         Radiologic Studies -   XR CHEST PORT   Final Result      Cardiomegaly with central vascular congestion and increased interstitial   markings, most suggestive of acute pulmonary edema. Acute atypical pulmonary   infection could also feasibly have a similar appearance. CT Results  (Last 48 hours)    None        CXR Results  (Last 48 hours)               01/16/22 1533  XR CHEST PORT Final result    Impression:      Cardiomegaly with central vascular congestion and increased interstitial   markings, most suggestive of acute pulmonary edema. Acute atypical pulmonary   infection could also feasibly have a similar appearance. Narrative:  EXAM: CHEST RADIOGRAPH, SINGLE VIEW       CLINICAL INDICATION/HISTORY: dyspnea, shortness of breath       COMPARISON: 9/1/2021       TECHNIQUE: Portable frontal view of the chest was obtained.        _______________       FINDINGS:       SUPPORT DEVICES: None. HEART AND MEDIASTINUM: Cardiomediastinal silhouette appears unchanged, markedly   enlarged. Tortuous and atherosclerotic thoracic aorta. Mild central vascular   congestion. LUNGS AND PLEURAL SPACES: Patchy interstitial and alveolar opacities are seen   throughout left and right lungs. No pleural effusion or pneumothorax. BONY THORAX AND SOFT TISSUES: No acute osseous abnormality.        _______________                 Medications given in the ED-  Medications   benzocaine-menthoL (CEPACOL) lozenge 1 Lozenge (has no administration in time range)   diphenhydrAMINE (BENADRYL) injection 25 mg (has no administration in time range)   calcium gluconate injection 1 g (1 g IntraVENous Given 1/16/22 1754)   sodium zirconium cyclosilicate (LOKELMA) powder packet 10 g (10 g Oral Given 1/16/22 1755)   dextrose (D50W) injection syrg 25 g (25 g IntraVENous Given 1/16/22 1754)   insulin regular (NOVOLIN R, HUMULIN R) injection 10 Units (10 Units SubCUTAneous Given 1/16/22 1756)   albuterol (PROVENTIL VENTOLIN) nebulizer solution 10 mg (10 mg Nebulization Given 1/16/22 1754)   sodium bicarbonate 8.4 % (1 mEq/mL) injection 50 mEq (50 mEq IntraVENous Given 1/16/22 1754)         Procedures     Procedures    ED Course     I am the first provider for this patient. I reviewed the vital signs, available nursing notes, past medical history, past surgical history, family history and social history. Records Reviewed: Nursing Notes    Pulse Oximetry Analysis - 97% on RA     Cardiac Monitor:  Rate: 96 bpm  Rhythm: sinus rhythm    EKG interpretation: (Preliminary)  Rhythm: NSR. Rate: 96 bpm; no STEMI, QT/QTc: 396/500 ms  EKG read by Gwendolyn Huntley MD at 2:58 PM    2:49 PM Initial assessment performed. The patients presenting problems have been discussed, and they are in agreement with the care plan formulated and outlined with them. I have encouraged them to ask questions as they arise throughout their visit. ED Course as of 01/16/22 2013   Harlow Bamberger Jan 16, 2022 1650 Discussed with Dr. Belle Lainez, he will arrange dialysis, lokelma 10 g, calcium, dextrose, albuterol to temperize. [JM]      ED Course User Index  [JM] Gal Webster MD       SIGN OUT:  7:15 PM  Patient's presentation, labs/imaging and plan of care was reviewed with Dr. Solo Figueroa  as part of sign out. They will follow up with Dialysis as part of the plan discussed with the patient. Dr. Min Vickers assistance in completion of this plan is greatly appreciated but it should be noted that I will be the provider of record for this patient.   Written by Gwendolyn Huntley MD.      Medical Decision Making     Provider Notes (Medical Decision Making):   DDX: Dialysis, electrolyte disorder, hyperkalemia, coronavirus infection, pulmonary edema    Discussion:  54 y.o. male with Monday Wednesday Friday dialysis. Patient is missed at least 1 dialysis and possibly up to an entire week of dialysis. Patient's potassium was over 7 today as well as having some shortness of breath. His chest x-ray was consistent with pulmonary edema. Patient did not require oxygen in the ED. Patient also had a negative rapid COVID test in the ED. Patient was discussed with Dr. Severa Shipper for emergent dialysis in the ED. Provided patient remained stable throughout dialysis, he may be discharged home tonight. Critical Care Time:   I have spent 45 minutes of critical care time involved in lab review, consultations with specialist, family decision-making, and documentation. During this entire length of time I was immediately available to the patient. Critical Care: The reason for providing this level of medical care for this critically ill patient was due a critical illness that impaired one or more vital organ systems such that there was a high probability of imminent or life threatening deterioration in the patients condition. This care involved high complexity decision making to assess, manipulate, and support vital system functions, to treat this degreee vital organ system failure and to prevent further life threatening deterioration of the patients condition. Diagnosis and Disposition     DISCHARGE NOTE:  6:55 AM  Kaylee Watson Jr.'s  results have been reviewed with him. He has been counseled regarding his diagnosis, treatment, and plan. He verbally conveys understanding and agreement of the signs, symptoms, diagnosis, treatment and prognosis and additionally agrees to follow up as discussed. He also agrees with the care-plan and conveys that all of his questions have been answered.   I have also provided discharge instructions for him that include: educational information regarding their diagnosis and treatment, and list of reasons why they would want to return to the ED prior to their follow-up appointment, should his condition change. He has been provided with education for proper emergency department utilization. CLINICAL IMPRESSION:    1. Acute pulmonary edema (HCC)    2. ESRD on dialysis ([address]    3. Acute hyperkalemia        PLAN:  1. D/C Home  2. Current Discharge Medication List        3. Follow-up Information     Follow up With Specialties Details Why Contact Info    Juan King MD Internal Medicine Schedule an appointment as soon as possible for a visit  As soon as possible, For follow up from Emergency Department visit. [id number] [address] [place]  [address]      Zia Martines MD Nephrology, Internal Medicine Schedule an appointment as soon as possible for a visit  As soon as possible, For follow up from Emergency Department visit. [address]  [address]  [phone]      [facility] EMERGENCY DEPT Emergency Medicine  As needed; If symptoms worsen [address] [id number] [address] [place]     Please note that this dictation was completed with [employer], the computer voice recognition software. Quite often unanticipated grammatical, syntax, homophones, and other interpretive errors are inadvertently transcribed by the computer software. Please disregard these errors. Please excuse any errors that have escaped final proofreading.     Jacinto Hou MD

## 2022-01-17 VITALS
TEMPERATURE: 97.3 F | RESPIRATION RATE: 17 BRPM | HEART RATE: 85 BPM | DIASTOLIC BLOOD PRESSURE: 70 MMHG | OXYGEN SATURATION: 96 % | SYSTOLIC BLOOD PRESSURE: 188 MMHG

## 2022-01-17 LAB
ATRIAL RATE: 96 BPM
CALCULATED P AXIS, ECG09: 57 DEGREES
CALCULATED R AXIS, ECG10: -11 DEGREES
CALCULATED T AXIS, ECG11: 38 DEGREES
DIAGNOSIS, 93000: NORMAL
GLUCOSE BLD STRIP.AUTO-MCNC: 110 MG/DL (ref 70–110)
GLUCOSE BLD STRIP.AUTO-MCNC: 144 MG/DL (ref 70–110)
GLUCOSE BLD STRIP.AUTO-MCNC: 49 MG/DL (ref 70–110)
HBV SURFACE AB SER QL IA: NEGATIVE
HBV SURFACE AB SERPL IA-ACNC: 4.94 MIU/ML
HBV SURFACE AG SER QL: <0.1 INDEX
HBV SURFACE AG SER QL: NEGATIVE
HEP BS AB COMMENT,HBSAC: ABNORMAL
P-R INTERVAL, ECG05: 192 MS
Q-T INTERVAL, ECG07: 396 MS
QRS DURATION, ECG06: 96 MS
QTC CALCULATION (BEZET), ECG08: 500 MS
VENTRICULAR RATE, ECG03: 96 BPM

## 2022-01-17 PROCEDURE — 74011000250 HC RX REV CODE- 250: Performed by: EMERGENCY MEDICINE

## 2022-01-17 PROCEDURE — 82962 GLUCOSE BLOOD TEST: CPT

## 2022-01-17 PROCEDURE — 96376 TX/PRO/DX INJ SAME DRUG ADON: CPT

## 2022-01-17 PROCEDURE — 74011250636 HC RX REV CODE- 250/636: Performed by: EMERGENCY MEDICINE

## 2022-01-17 RX ORDER — DEXTROSE 50 % IN WATER (D50W) INTRAVENOUS SYRINGE
Status: DISCONTINUED
Start: 2022-01-17 | End: 2022-01-17 | Stop reason: HOSPADM

## 2022-01-17 RX ORDER — DEXTROSE MONOHYDRATE 100 MG/ML
INJECTION, SOLUTION INTRAVENOUS
Status: DISCONTINUED
Start: 2022-01-17 | End: 2022-01-17 | Stop reason: HOSPADM

## 2022-01-17 RX ORDER — LABETALOL HCL 20 MG/4 ML
20 SYRINGE (ML) INTRAVENOUS
Status: COMPLETED | OUTPATIENT
Start: 2022-01-17 | End: 2022-01-17

## 2022-01-17 RX ORDER — DEXTROSE 50 % IN WATER (D50W) INTRAVENOUS SYRINGE
25
Status: COMPLETED | OUTPATIENT
Start: 2022-01-17 | End: 2022-01-17

## 2022-01-17 RX ADMIN — DEXTROSE MONOHYDRATE 25 G: 25 INJECTION, SOLUTION INTRAVENOUS at 00:05

## 2022-01-17 RX ADMIN — LABETALOL HYDROCHLORIDE 20 MG: 5 INJECTION, SOLUTION INTRAVENOUS at 02:22

## 2022-01-17 NOTE — ED PROVIDER NOTES
82-year-old male dialysis patient signed out to me at shift turnover by Dr. Bri Bates, pt pending dialysis. Patient underwent dialysis in the ED. Pt has not taken home medications in some time and required treatment for HTN during dialysis. He was observed in the ED after dialysis and BP medications given without incident.  DC home to followup with nephrology and PMD

## 2022-01-17 NOTE — ED NOTES
Called to room by charge nurse patients blood sugar 39, 2amps of dex given pt more alert, will re eval blood sugar.

## 2022-01-17 NOTE — PROGRESS NOTES
Intervention: Monitor/Manage Fluid Electrolyte/Acid Base Balance    PROBLEM: HEMODIALYSIS ADULT    INTERVENTION: Hemodynamic stabilization  Maintain BP WNL for this patient while on hemodialysis    INTERVENTION: Fluid Management  Will attempt 3500 ml total fluid removal as tolerated    INTERVENTION: Metabolic / Electrolyte Imbalance Management  2 K+ potassium, 2.5 Ca+ bath today with dialysis    GOAL: Signs and symptoms of listed potential problems will be absent or manageable  (reference Hemodialysis ADULT CPG)    OUTCOME: Progressing  HD planned for 3 hours today      Problem: Chronic Renal Failure  Goal: *Fluid and electrolytes stabilized  Outcome: Progressing Towards Goal     Problem: Patient Education: Go to Patient Education Activity  Goal: Patient/Family Education  Outcome: Progressing Towards Goal

## 2022-01-17 NOTE — ED NOTES
Pt observed resting on gurney. Placed on bedpan per Pt request.    Loose stool observed and collected. Pt cleaned and repositioned. Continues to be hypertensive. Will reassess.

## 2022-01-17 NOTE — PROGRESS NOTES
TREATMENT SUMMARY   Patient in room ER 10 dialyzed for 3 hours. Tolerated tx fair with without complaint or complications. LUE AVG functioning without complication accessing or BFR   mL/min   mL/min  4000 ml UF removed with a net UF removal of 3500 ml. Bed in lowest position and locked. Call bell within reach. Report given to COLTON Bro RN with all questions answered. TREATMENT NOTES    2345 patient started seizing. Treatment suspended after 2.75 hrs. He was tachycardic throughout treatment. Post HD Blood glucose was 38 mg/dL. D50 was administered by primary Nurse. Blood glucose increased to 144 mg/dL. Dr. Obie Gregorio, Nephrologist made aware. ACUTE HEMODIALYSIS FLOW SHEET       PATIENT INFORMATION   [address]       DR. 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 syndrome_____________ Code Status []Full Code [] DNR  [x] Other_Prior (has [facility] docs)____   Diet: [x] Renal [] NPO [] Diabetic   [] Enteral Feeding [] Cardiac Diabetic: [x]Yes []No     [x]Signed Treatment Consent Verified   [x] Time Out/ Safety Check   PRIMARY NURSE REPORT: FIRST INITIAL/ LAST NAME/TITLE  PRE DIALYSIS:     Amos Dixon RN                                     TIME:2000   ACCESS   CATHETER ACCESS: [x] N/A  [] RIGHT  [] LEFT  [] IJ  [] SUBCL [] FEM                    [] First use X-ray  [] Tunnel     [] Non-Tunneled      [] No S/S infection  [] Redness [] Drainage  [] Cultured [] Swelling [] Pain                    [] Medical Aseptic [] Prep Dressing Changed                  [] Clotted [] Patent []      Flows: [] Good [] Poor [] Reversed                 If Access Problem [address]: [] Yes [] No    Date:_____  [] N/A[]   GRAFT/FISTULA ACCESS:  [] N/A  [] RIGHT  [x] LEFT  [x] UE   [] LE       [x] AVG  [] AVF [] BUTTONHOLE    [x] +BRUIT/THRILL [x] MEDICAL ASEPTIC PREP     [x] No S/S infection  [] Redness [] Drainage  [] Cultured [] Swelling [] Pain              If Access Problem [address]: [] Yes [] No    Date:______ [x] N/A   GENERAL ASSESSMENT   LUNGS:  SaO2% _100___ [x] Clear [] Coarse [] Crackles [] Wheezing               [] Diminished Location: [] RLL [] LLL [] RUL [] KYLAH    COUGH:  [] Productive  [] Loose[] Dry [x] N/A  RESPIRATIONS: [x] Easy [] Labored    THERAPY: [x] RA   [] NC _____. L/min    Mask: [] NRB [] Venti  _____O2%                  [] Ventilator [] Intubated [] Trach [] BiPap [] CPap [] HI Flow   CARDIAC: [] Regular [x] Irregular [] Pericardial Rub [] JVD               Monitored Rhythm:______ [] N/A   EDEMA: [x] None [] Generalized [] Facial [] Pedal [] UE [] LE             [] Pitting [] 1 [] 2 [] 3 [] 4    [] Right [] Left [] Bilateral   SKIN:    [x] Warm [] Hot [] Cold  [x] Dry [] Pale [] Diaphoretic              [] Flushed [] Jaundiced [] Cyanotic [] Rash [] Weeping     LOC:    [x] Alert  Oriented to: [x] Person [x] Place [x] Time             [] Confused [] Lethargic [] Medicated [] Non-responsive    GI/ABDOMEN: [] Flat [] Distended [x] Soft [] Firm [] Diarrhea [x] Bowel Sounds Present [] Nausea [] Vomiting    PAIN: [x] 0 [] 1 [] 2 [] 3 [] 4 [] 5 [] 6 [] 7 [] 8 [] 9 [] 10          Scale 1-10 Action/Follow Up_____   MOBILITY: [] Amb [x] Amb/Assist [] Bed  [] Wheelchair    CURRENT LABS   HBsAg ONLY: Date Drawn: 12/15/2021            [x] Negative [] Positive [] Unknown.      HBsAb: Date Drawn:     02/26/2021         [] Susceptible <10 [x] Immune ?10 [] Unknown   Date of Current Labs:  ATTACHED     EDUCATION   Person Educated: [x] Patient [] Other_________   Knowledge base: [] None [x] Minimal [] Substantial    Barriers to learning  [x] None _______________   Preferred method of learning: [] Written [x] Oral [x] Visual [] Hands on    Topic: [x] Access Care [x] S&S of infection [x] Fluid Management   [x] K+  [x] Procedural  [] Albumin [] Medications [] Tx Options   [] Transplant [x] Diet [] Other    Teaching Tools: [x] Explain [] Demonstration [] Handout_____ [] Video______  CARE PLAN    [x] Renal Failure (Adult)  Interdisciplinary  · Fluid and electrolytes stabilized  ? Interventions  · Dehydration signs and symptoms (eg: Weight/lab monitoring; vomiting/diarrhea/urine; tenting; mucous membranes; dizziness/lethargy/irritability/confusion; weak pulse; tachycardia; blood pressure; I&O)  · Fluid overload signs and symptoms assessment (eg: Body weight increased; dyspnea; edema; hypertension; respiratory crackles/wheezing; JVD; lab monitoring; mental status changes; I&O)  · Monitor appropriate lab values  · COMPLIANCE WITH PRESCRIBED THERAPY  · ARTERIAL ACCESS SITE ASSESSMENT  · NUTRITION SCREENING  · Vital signs monitoring per assessed patient condition or unit standard  · Cardiac monitoring  · Hydration management  · Intake and output measurement  · Body weight monitoring  · Skin care  · DIALYSIS  · Nutrition Care Process per nutrition screen  · Oral hygiene care every 2 hours  · Pain management     · Outcome   ? [x] Progressing Towards Goal  ? [] Not Progressing Towards Goals  ? [] Goals Met/Resolved  ? [] Goals Not Met/ Resolved        · Patient/ Family Education  ?  Progressing Towards Goals          RO/HEMODIAYLSIS MACHINE SAFETY CHECKS- BEFORE EACH TREATMENT          [] [facility]: Machine Serial #1:  [id number]   RO Serial #1: [id number]                       [x] [facility]: Machine Serial #2:  [id number]   RO Serial #2: [id number]        [] [facility]: Machine Serial #3:  [id number]    Serial #3:[id number]    Alarm Test: [x] Pass  Time__2030______  [x] RO/Machine Log Complete    [x] Extracorporeal circuit Tested for integrity           Dialyzer_[id number]_________   Tubing_20G24-11___________    Dialysate: pH_7.4__  Temp. _36___Conductivity: Meter _14___ HD Machine_14__   CHLORINE TESTING- BEFORE EACH TREATMENT AND EVERY 4 HOURS   Total Chlorine: [x] Less than 0.1 ppm Time:_2030____2nd Check Time:_N/A_____  (If greater than 0.1 ppm from Primary then every 30 minutes from Secondary)   TREATMENT INIATION-WITH DIALYSIS PRECAUTIONS   [x] All Connections Secured   [x] Saline Line Double Clamped    [x] Venous Parameters Set [x] Arterial Parameters Set    [x] Prime Given 250 ml     [x] Air Foam Detector Engaged   PRE-TREATMENT   UF Calculations: Wt to lose:__3500__ml(+) Oral:__ml(+)IV Meds/Fluids/Blood prods___ml(+) Prime/Rinse_500__ml(=)Total UF Goal_4000__mL   Scale Type:[x] Bed scale [] Sling Scale [] Wheel Chair Scale []  Not Ordered [] [] Unable to obtain pt on stretcher/ bed scale malfunctioning   Tx Initiation Note:  Received patient in bed semi fowlers position. Left  upper arm AVG positive bruit and thrill present. No s/s of infection noted. Same prepped aseptically and cannulated x 2 #15 gauge needles. Treatment initiated as per MD order without incident. Plan to remove 3.5 liters for 3 hours while monitoring patient's well-being and vital signs.    [x] Time Out/Safety Check  Time:_2048____   INTRADIALYTIC MONITORING  (SEE ATTACHED FLOWSHEET)     POST TREATMENT    Time Medication Dose Volume Route    Initials                                           [facility] Signatures Title Initials Time   Real [place] RN GM [id number]               Dialyzer cleared: [] Good [x] Fair [] Poor     Blood Processed _61.8__Liters    Net UF Removed _3021___mL  Post Tx Access:                  AVF/AVG: Bleeding Stop       Art._15__min Chaim.__10__min [x]+bruit/thrill                Catheter: Locking Solution [] Heparin 1 ml/1000 units  [] Normal Saline Art._____ ml Chaim._____ml  Post Assessment:              Skin: [x]Warm [x]Dry []Diaphoretic []Flushed [] Pale []Cyanotic            Lungs: [x]Clear []Coarse []Crackles []Wheezing             Cardiac: [x]Regular []Irregular  []Monitored rhythm____ []N/A            Edema: [x]None []General []Facial []Pedal  []UE []LE []RIGHT []LEFT            Pain: [x]0 []1 []2 []3 []4 []5 []6 []7 []8 []9 []10   POST Tx Note:  HD treatment completed and tolerated fair. Patient rinsed with 250 mL 0.9% N/S. Fistula needles removed, post bleeding WNL. Gauze/Tape dressing applied to puncture site. No active bleeding noted. Pt. remains in ER bed 10 in stable condition. O2 sat 100% No acute distress noted. Primary Nurse Report: First initial/Last name/Title    Post Dialysis:___A.  Karly Novoa RN_______________________         Time:_____0015___________    Abbreviations: AVG-arterial venous graft, AVF-arterial venous fistula, IJ-Internal Jugular,  Subcl-Subclavian, Fem-Femoral, Tx-treatment, AP/HR-apical heart rate, DFR-dialysate flow rate, BFR-blood flow rate, AP-arterial pressure, -venous pressure, UF-ultrafiltrate, TMP-transmembrane pressure, Chaim-Venous, Art-Arterial, RO-Reverse Osmosis

## 2022-03-18 PROBLEM — Z91.158 NON-COMPLIANCE WITH RENAL DIALYSIS: Status: ACTIVE | Noted: 2021-08-27

## 2022-03-18 PROBLEM — Z91.15 NON-COMPLIANCE WITH RENAL DIALYSIS (HCC): Status: ACTIVE | Noted: 2021-08-27

## 2022-03-18 PROBLEM — I42.5 OTHER RESTRICTIVE CARDIOMYOPATHY (HCC): Status: ACTIVE | Noted: 2018-03-04

## 2022-03-18 PROBLEM — E87.1 HYPONATREMIA: Status: ACTIVE | Noted: 2018-02-28

## 2022-03-18 PROBLEM — K62.89 INFECTIVE PROCTITIS: Status: ACTIVE | Noted: 2020-12-24

## 2022-03-19 PROBLEM — N28.9 ACUTE ON CHRONIC RENAL INSUFFICIENCY: Status: ACTIVE | Noted: 2018-02-28

## 2022-03-19 PROBLEM — E16.2 HYPOGLYCEMIA: Status: ACTIVE | Noted: 2020-01-30

## 2022-03-19 PROBLEM — I16.9 HYPERTENSIVE CRISIS: Status: ACTIVE | Noted: 2021-08-27

## 2022-03-19 PROBLEM — N18.9 ACUTE ON CHRONIC RENAL INSUFFICIENCY: Status: ACTIVE | Noted: 2018-02-28

## 2022-03-19 PROBLEM — Z99.2 ESRD (END STAGE RENAL DISEASE) ON DIALYSIS (HCC): Status: ACTIVE | Noted: 2020-01-29

## 2022-03-19 PROBLEM — N19 UREMIA: Status: ACTIVE | Noted: 2021-08-27

## 2022-03-19 PROBLEM — G92.8 TOXIC METABOLIC ENCEPHALOPATHY: Status: ACTIVE | Noted: 2021-09-02

## 2022-03-19 PROBLEM — N18.6 ESRD (END STAGE RENAL DISEASE) ON DIALYSIS (HCC): Status: ACTIVE | Noted: 2020-01-29

## 2022-03-19 PROBLEM — E87.5 HYPERKALEMIA: Status: ACTIVE | Noted: 2021-08-27

## 2022-03-19 PROBLEM — I16.0 HYPERTENSIVE URGENCY: Status: ACTIVE | Noted: 2020-12-22

## 2022-03-19 PROBLEM — D50.9 IRON DEFICIENCY ANEMIA: Status: ACTIVE | Noted: 2018-03-04

## 2022-03-19 PROBLEM — I10 HTN (HYPERTENSION): Status: ACTIVE | Noted: 2020-01-29

## 2022-03-19 PROBLEM — Z89.511 HX OF BKA, RIGHT (HCC): Status: ACTIVE | Noted: 2020-01-29

## 2022-03-20 PROBLEM — E11.649 HYPOGLYCEMIA ASSOCIATED WITH DIABETES (HCC): Status: ACTIVE | Noted: 2021-08-27

## 2022-03-20 PROBLEM — L02.619 CELLULITIS AND ABSCESS OF FOOT: Status: ACTIVE | Noted: 2020-01-29

## 2022-03-20 PROBLEM — L03.119 CELLULITIS AND ABSCESS OF FOOT: Status: ACTIVE | Noted: 2020-01-29

## 2022-05-16 ENCOUNTER — HOSPITAL ENCOUNTER (EMERGENCY)
Age: 52
Discharge: HOME | End: 2022-05-16
Attending: STUDENT IN AN ORGANIZED HEALTH CARE EDUCATION/TRAINING PROGRAM
Payer: MEDICARE

## 2022-05-16 ENCOUNTER — APPOINTMENT (OUTPATIENT)
Dept: CT IMAGING | Age: 52
End: 2022-05-16
Attending: STUDENT IN AN ORGANIZED HEALTH CARE EDUCATION/TRAINING PROGRAM
Payer: MEDICARE

## 2022-05-16 VITALS
RESPIRATION RATE: 18 BRPM | TEMPERATURE: 97.9 F | DIASTOLIC BLOOD PRESSURE: 82 MMHG | SYSTOLIC BLOOD PRESSURE: 166 MMHG | HEART RATE: 90 BPM | OXYGEN SATURATION: 99 %

## 2022-05-16 DIAGNOSIS — N18.6 ESRD ON HEMODIALYSIS (HCC): Primary | ICD-10-CM

## 2022-05-16 DIAGNOSIS — Z99.2 ESRD ON HEMODIALYSIS (HCC): Primary | ICD-10-CM

## 2022-05-16 DIAGNOSIS — R19.7 DIARRHEA, UNSPECIFIED TYPE: ICD-10-CM

## 2022-05-16 DIAGNOSIS — E87.5 ACUTE HYPERKALEMIA: ICD-10-CM

## 2022-05-16 LAB
ALBUMIN SERPL-MCNC: 3.2 G/DL (ref 3.4–5)
ALBUMIN/GLOB SERPL: 0.7 {RATIO} (ref 0.8–1.7)
ALP SERPL-CCNC: 79 U/L (ref 45–117)
ALT SERPL-CCNC: 10 U/L (ref 16–61)
ANION GAP SERPL CALC-SCNC: 17 MMOL/L (ref 3–18)
AST SERPL-CCNC: 5 U/L (ref 10–38)
BASOPHILS # BLD: 0.1 K/UL (ref 0–0.1)
BASOPHILS NFR BLD: 1 % (ref 0–2)
BILIRUB SERPL-MCNC: 0.5 MG/DL (ref 0.2–1)
BUN SERPL-MCNC: 111 MG/DL (ref 7–18)
BUN/CREAT SERPL: 5 (ref 12–20)
CALCIUM SERPL-MCNC: 7.3 MG/DL (ref 8.5–10.1)
CHLORIDE SERPL-SCNC: 113 MMOL/L (ref 100–111)
CO2 SERPL-SCNC: 12 MMOL/L (ref 21–32)
CREAT SERPL-MCNC: 23.8 MG/DL (ref 0.6–1.3)
DIFFERENTIAL METHOD BLD: ABNORMAL
EOSINOPHIL # BLD: 0.3 K/UL (ref 0–0.4)
EOSINOPHIL NFR BLD: 4 % (ref 0–5)
ERYTHROCYTE [DISTWIDTH] IN BLOOD BY AUTOMATED COUNT: 20.7 % (ref 11.6–14.5)
GLOBULIN SER CALC-MCNC: 4.7 G/DL (ref 2–4)
GLUCOSE BLD STRIP.AUTO-MCNC: 105 MG/DL (ref 70–110)
GLUCOSE BLD STRIP.AUTO-MCNC: 126 MG/DL (ref 70–110)
GLUCOSE BLD STRIP.AUTO-MCNC: 49 MG/DL (ref 70–110)
GLUCOSE BLD STRIP.AUTO-MCNC: 51 MG/DL (ref 70–110)
GLUCOSE BLD STRIP.AUTO-MCNC: 53 MG/DL (ref 70–110)
GLUCOSE BLD STRIP.AUTO-MCNC: 53 MG/DL (ref 70–110)
GLUCOSE BLD STRIP.AUTO-MCNC: 56 MG/DL (ref 70–110)
GLUCOSE BLD STRIP.AUTO-MCNC: 60 MG/DL (ref 70–110)
GLUCOSE BLD STRIP.AUTO-MCNC: 61 MG/DL (ref 70–110)
GLUCOSE SERPL-MCNC: 87 MG/DL (ref 74–99)
HCT VFR BLD AUTO: 22.8 % (ref 36–48)
HEMOCCULT STL QL: POSITIVE
HGB BLD-MCNC: 7.4 G/DL (ref 13–16)
IMM GRANULOCYTES # BLD AUTO: 0.1 K/UL (ref 0–0.04)
IMM GRANULOCYTES NFR BLD AUTO: 1 % (ref 0–0.5)
LIPASE SERPL-CCNC: 120 U/L (ref 73–393)
LYMPHOCYTES # BLD: 0.6 K/UL (ref 0.9–3.6)
LYMPHOCYTES NFR BLD: 9 % (ref 21–52)
MCH RBC QN AUTO: 26.3 PG (ref 24–34)
MCHC RBC AUTO-ENTMCNC: 32.5 G/DL (ref 31–37)
MCV RBC AUTO: 81.1 FL (ref 78–100)
MONOCYTES # BLD: 0.9 K/UL (ref 0.05–1.2)
MONOCYTES NFR BLD: 14 % (ref 3–10)
NEUTS SEG # BLD: 4.3 K/UL (ref 1.8–8)
NEUTS SEG NFR BLD: 71 % (ref 40–73)
NRBC # BLD: 0 K/UL (ref 0–0.01)
NRBC BLD-RTO: 0 PER 100 WBC
PLATELET # BLD AUTO: 169 K/UL (ref 135–420)
PLATELET COMMENTS,PCOM: ABNORMAL
PMV BLD AUTO: 10.3 FL (ref 9.2–11.8)
POTASSIUM SERPL-SCNC: 6.2 MMOL/L (ref 3.5–5.5)
PROT SERPL-MCNC: 7.9 G/DL (ref 6.4–8.2)
RBC # BLD AUTO: 2.81 M/UL (ref 4.35–5.65)
RBC MORPH BLD: ABNORMAL
SODIUM SERPL-SCNC: 142 MMOL/L (ref 136–145)
WBC # BLD AUTO: 6.3 K/UL (ref 4.6–13.2)

## 2022-05-16 PROCEDURE — G0257 UNSCHED DIALYSIS ESRD PT HOS: HCPCS

## 2022-05-16 PROCEDURE — 74011250637 HC RX REV CODE- 250/637: Performed by: STUDENT IN AN ORGANIZED HEALTH CARE EDUCATION/TRAINING PROGRAM

## 2022-05-16 PROCEDURE — 96366 THER/PROPH/DIAG IV INF ADDON: CPT

## 2022-05-16 PROCEDURE — 74176 CT ABD & PELVIS W/O CONTRAST: CPT

## 2022-05-16 PROCEDURE — 82962 GLUCOSE BLOOD TEST: CPT

## 2022-05-16 PROCEDURE — 94640 AIRWAY INHALATION TREATMENT: CPT

## 2022-05-16 PROCEDURE — 96368 THER/DIAG CONCURRENT INF: CPT

## 2022-05-16 PROCEDURE — 80053 COMPREHEN METABOLIC PANEL: CPT

## 2022-05-16 PROCEDURE — 96365 THER/PROPH/DIAG IV INF INIT: CPT

## 2022-05-16 PROCEDURE — 83690 ASSAY OF LIPASE: CPT

## 2022-05-16 PROCEDURE — 93005 ELECTROCARDIOGRAM TRACING: CPT

## 2022-05-16 PROCEDURE — 74011250636 HC RX REV CODE- 250/636: Performed by: STUDENT IN AN ORGANIZED HEALTH CARE EDUCATION/TRAINING PROGRAM

## 2022-05-16 PROCEDURE — 85025 COMPLETE CBC W/AUTO DIFF WBC: CPT

## 2022-05-16 PROCEDURE — 99284 EMERGENCY DEPT VISIT MOD MDM: CPT

## 2022-05-16 PROCEDURE — 96375 TX/PRO/DX INJ NEW DRUG ADDON: CPT

## 2022-05-16 PROCEDURE — 74011636637 HC RX REV CODE- 636/637: Performed by: STUDENT IN AN ORGANIZED HEALTH CARE EDUCATION/TRAINING PROGRAM

## 2022-05-16 PROCEDURE — 74011000250 HC RX REV CODE- 250: Performed by: STUDENT IN AN ORGANIZED HEALTH CARE EDUCATION/TRAINING PROGRAM

## 2022-05-16 PROCEDURE — 82272 OCCULT BLD FECES 1-3 TESTS: CPT

## 2022-05-16 RX ORDER — DEXTROSE MONOHYDRATE 100 MG/ML
125-250 INJECTION, SOLUTION INTRAVENOUS AS NEEDED
Status: DISCONTINUED | OUTPATIENT
Start: 2022-05-16 | End: 2022-05-16 | Stop reason: HOSPADM

## 2022-05-16 RX ORDER — ALBUTEROL SULFATE 2.5 MG/.5ML
10 SOLUTION RESPIRATORY (INHALATION) ONCE
Status: COMPLETED | OUTPATIENT
Start: 2022-05-16 | End: 2022-05-16

## 2022-05-16 RX ORDER — CALCIUM GLUCONATE 20 MG/ML
1 INJECTION, SOLUTION INTRAVENOUS ONCE
Status: COMPLETED | OUTPATIENT
Start: 2022-05-16 | End: 2022-05-16

## 2022-05-16 RX ORDER — SODIUM CHLORIDE 0.9 % (FLUSH) 0.9 %
5-40 SYRINGE (ML) INJECTION AS NEEDED
Status: DISCONTINUED | OUTPATIENT
Start: 2022-05-16 | End: 2022-05-16 | Stop reason: HOSPADM

## 2022-05-16 RX ORDER — DEXTROSE MONOHYDRATE 100 MG/ML
250 INJECTION, SOLUTION INTRAVENOUS ONCE
Status: COMPLETED | OUTPATIENT
Start: 2022-05-16 | End: 2022-05-16

## 2022-05-16 RX ORDER — SODIUM CHLORIDE 0.9 % (FLUSH) 0.9 %
5-40 SYRINGE (ML) INJECTION EVERY 8 HOURS
Status: DISCONTINUED | OUTPATIENT
Start: 2022-05-16 | End: 2022-05-16 | Stop reason: HOSPADM

## 2022-05-16 RX ADMIN — DEXTROSE MONOHYDRATE 250 ML: 100 INJECTION, SOLUTION INTRAVENOUS at 12:15

## 2022-05-16 RX ADMIN — DEXTROSE MONOHYDRATE 250 ML: 100 INJECTION, SOLUTION INTRAVENOUS at 13:56

## 2022-05-16 RX ADMIN — INSULIN HUMAN 10 UNITS: 100 INJECTION, SOLUTION PARENTERAL at 12:16

## 2022-05-16 RX ADMIN — SODIUM ZIRCONIUM CYCLOSILICATE 5 G: 5 POWDER, FOR SUSPENSION ORAL at 12:52

## 2022-05-16 RX ADMIN — CALCIUM GLUCONATE 1000 MG: 20 INJECTION, SOLUTION INTRAVENOUS at 12:17

## 2022-05-16 RX ADMIN — ALBUTEROL SULFATE 10 MG: 2.5 SOLUTION RESPIRATORY (INHALATION) at 12:16

## 2022-05-16 NOTE — DISCHARGE INSTRUCTIONS
Please call Dr. Jc Cox with GI to schedule a follow-up appointment for your low blood counts, he is also a GI specialist and would like to do a further work-up on your anal leakage    Continue all your home medications and dialysis as previously scheduled. Return to emergency department as needed.

## 2022-05-16 NOTE — ED TRIAGE NOTES
Pt arrives to ER via EMS w c/o rectal pain x 2 days. Denies rectal bleeding, reports small amount of blood when wiping.

## 2022-05-16 NOTE — ED PROVIDER NOTES
EMERGENCY DEPARTMENT HISTORY AND PHYSICAL EXAM    10:23 AM    Date: 5/16/2022  Patient Name: Joe Schultz    History of Presenting Illness     Chief Complaint   Patient presents with    Anal Pain       History Provided By: Patient  Location/Duration/Severity/Modifying factors   HPI  Joe Schultz is a 54 y.o. male assessment history hypertension, ESRD on HD (MWF) presenting for evaluation of anal pain and abdominal pain. He says that for the last 2 days he has had gradually worsening pain in the left lower quadrant of his abdomen and down towards his anus. Pain is moderate, throbbing. He has not taken anything for it. Nothing makes it better, made worse by pressing on his abdomen. He says that last time he had a normal bowel movement was about 2 days ago, for the last 2 to 3 days he has had leakage of fluid and mucus from his rectum. Denies any rectal bleeding to me does not make urine. Denies nausea or vomiting, fevers, chest pain or difficulty breathing. Last had dialysis on Thursday which is a full run. Denies other medical needs today. PCP: Mary Alcantar MD    Current Facility-Administered Medications   Medication Dose Route Frequency Provider Last Rate Last Admin    sodium chloride (NS) flush 5-40 mL  5-40 mL IntraVENous Q8H Dominic Ramirez MD        sodium chloride (NS) flush 5-40 mL  5-40 mL IntraVENous PRN Dominic Ramirez MD        dextrose 10% infusion 125-250 mL  125-250 mL IntraVENous PRN Dominic Ramirez MD   250 mL at 05/16/22 1356     Current Outpatient Medications   Medication Sig Dispense Refill    aspirin delayed-release 81 mg tablet Take 1 Tablet by mouth daily. 30 Tablet 0    doxazosin (CARDURA) 4 mg tablet Take 2 mg by mouth daily.  sodium hypochlorite 0.0125% (DAKINS SOLUTION) Apply 1 mL to affected area two (2) times a week.  omeprazole (PRILOSEC) 40 mg capsule Take 40 mg by mouth daily.       sevelamer carbonate (RENVELA) 800 mg tab tab Take 800 mg by mouth three (3) times daily (with meals).  sodium bicarbonate 650 mg tablet Take 650 mg by mouth three (3) times daily.  amLODIPine (NORVASC) 10 mg tablet Take 1 Tab by mouth daily. 30 Tab 0    carvedilol (COREG) 25 mg tablet Take 1 Tab by mouth two (2) times daily (with meals). 60 Tab 0    hydrALAZINE (APRESOLINE) 25 mg tablet Take 1 Tab by mouth three (3) times daily. (Patient taking differently: Take 50 mg by mouth two (2) times a day.) 90 Tab 0    isosorbide mononitrate ER (IMDUR) 60 mg CR tablet Take 1 Tab by mouth daily. 30 Tab 0    furosemide (LASIX) 80 mg tablet Take 1 Tab by mouth two (2) times a day. 61 Tab 0       Past History     Past Medical History:  Past Medical History:   Diagnosis Date    Chronic kidney disease     Diabetes [facility]     Heart failure ([facility]     Hypertension     Sickle cell trait ([facility]        Past Surgical History:  Past Surgical History:   Procedure Laterality Date    HX ORTHOPAEDIC      right BKA 2017       Family History:  History reviewed. No pertinent family history. Social History:  Social History     Tobacco Use    Smoking status: Former Smoker    Smokeless tobacco: Never Used   Substance Use Topics    Alcohol use: No    Drug use: No       Allergies: Allergies   Allergen Reactions    Penicillins Rash    Vancomycin Other (comments)     kayla syndrome       I reviewed and confirmed the above information with patient and updated as necessary. Review of Systems     Review of Systems   Constitutional: Negative for diaphoresis and fever. HENT: Negative for ear pain and sore throat. Eyes:        No acute change in vision   Respiratory: Negative for cough and shortness of breath. Cardiovascular: Negative for chest pain and leg swelling. Gastrointestinal: Positive for abdominal pain, constipation and diarrhea. Negative for nausea and vomiting. Anal pain   Genitourinary: Negative for dysuria. Musculoskeletal: Negative for neck pain.    Skin: Negative for wound. Neurological: Negative for weakness and headaches. Physical Exam     Visit Vitals  BP (!) 183/92   Pulse 90   Temp 97.9 °F (36.6 °C)   Resp 27   SpO2 97%       Physical Exam  Vitals and nursing note reviewed. Constitutional:       Appearance: Normal appearance. He is not ill-appearing. HENT:      Mouth/Throat:      Mouth: Mucous membranes are moist.   Eyes:      Pupils: Pupils are equal, round, and reactive to light. Cardiovascular:      Rate and Rhythm: Normal rate and regular rhythm. Pulses: Normal pulses. Heart sounds: Normal heart sounds. Comments: Fistula in the left upper extremity, pulsatile  Pulmonary:      Effort: Pulmonary effort is normal.      Breath sounds: Normal breath sounds. Abdominal:      General: Abdomen is flat. Tenderness: There is abdominal tenderness (Tender palpation left lower quadrant, no rebound or guarding). Genitourinary:     Comments: Rectal exam with soft brown stool, no fissures, no evidence of any mucus  Musculoskeletal:         General: No swelling. Normal range of motion. Cervical back: Normal range of motion. Skin:     General: Skin is warm. Neurological:      General: No focal deficit present. Mental Status: He is alert and oriented to person, place, and time.          Diagnostic Study Results     Labs -  Recent Results (from the past 24 hour(s))   EKG, 12 LEAD, INITIAL    Collection Time: 05/16/22 10:10 AM   Result Value Ref Range    Ventricular Rate 88 BPM    Atrial Rate 88 BPM    P-R Interval 158 ms    QRS Duration 84 ms    Q-T Interval 402 ms    QTC Calculation (Bezet) 486 ms    Calculated P Axis 82 degrees    Calculated R Axis 27 degrees    Calculated T Axis 102 degrees    Diagnosis       Normal sinus rhythm  Abnormal QRS-T angle, consider primary T wave abnormality  Prolonged QT  Abnormal ECG  When compared with ECG of 16-JAN-2022 14:58,  T wave amplitude has decreased in Lateral leads     CBC WITH AUTOMATED DIFF    Collection Time: 05/16/22 10:24 AM   Result Value Ref Range    WBC 6.3 4.6 - 13.2 K/uL    RBC 2.81 (L) 4.35 - 5.65 M/uL    HGB 7.4 (L) 13.0 - 16.0 g/dL    HCT 22.8 (L) 36.0 - 48.0 %    MCV 81.1 78.0 - 100.0 FL    MCH 26.3 24.0 - 34.0 PG    MCHC 32.5 31.0 - 37.0 g/dL    RDW 20.7 (H) 11.6 - 14.5 %    PLATELET 691 232 - 025 K/uL    MPV 10.3 9.2 - 11.8 FL    NRBC 0.0 0  WBC    ABSOLUTE NRBC 0.00 0.00 - 0.01 K/uL    NEUTROPHILS 71 40 - 73 %    LYMPHOCYTES 9 (L) 21 - 52 %    MONOCYTES 14 (H) 3 - 10 %    EOSINOPHILS 4 0 - 5 %    BASOPHILS 1 0 - 2 %    IMMATURE GRANULOCYTES 1 (H) 0.0 - 0.5 %    ABS. NEUTROPHILS 4.3 1.8 - 8.0 K/UL    ABS. LYMPHOCYTES 0.6 (L) 0.9 - 3.6 K/UL    ABS. MONOCYTES 0.9 0.05 - 1.2 K/UL    ABS. EOSINOPHILS 0.3 0.0 - 0.4 K/UL    ABS. BASOPHILS 0.1 0.0 - 0.1 K/UL    ABS. IMM. GRANS. 0.1 (H) 0.00 - 0.04 K/UL    DF AUTOMATED      PLATELET COMMENTS ADEQUATE PLATELETS      RBC COMMENTS MACROCYTOSIS  1+        RBC COMMENTS MICROCYTOSIS  1+        RBC COMMENTS POLYCHROMASIA  SLIGHT  ANISOCYTOSIS  2+        RBC COMMENTS SPHEROCYTES  1+        RBC COMMENTS TARGET CELLS  1+       METABOLIC PANEL, COMPREHENSIVE    Collection Time: 05/16/22 10:24 AM   Result Value Ref Range    Sodium 142 136 - 145 mmol/L    Potassium 6.2 (HH) 3.5 - 5.5 mmol/L    Chloride 113 (H) 100 - 111 mmol/L    CO2 12 (L) 21 - 32 mmol/L    Anion gap 17 3.0 - 18 mmol/L    Glucose 87 74 - 99 mg/dL     (H) 7.0 - 18 MG/DL    Creatinine 23.80 (H) 0.6 - 1.3 MG/DL    BUN/Creatinine ratio 5 (L) 12 - 20      GFR est AA 2 (L) >60 ml/min/1.73m2    GFR est non-AA 2 (L) >60 ml/min/1.73m2    Calcium 7.3 (L) 8.5 - 10.1 MG/DL    Bilirubin, total 0.5 0.2 - 1.0 MG/DL    ALT (SGPT) 10 (L) 16 - 61 U/L    AST (SGOT) 5 (L) 10 - 38 U/L    Alk.  phosphatase 79 45 - 117 U/L    Protein, total 7.9 6.4 - 8.2 g/dL    Albumin 3.2 (L) 3.4 - 5.0 g/dL    Globulin 4.7 (H) 2.0 - 4.0 g/dL    A-G Ratio 0.7 (L) 0.8 - 1.7     LIPASE    Collection Time: 05/16/22 10:24 AM   Result Value Ref Range    Lipase 120 73 - 393 U/L   OCCULT BLOOD, STOOL    Collection Time: 05/16/22 12:26 PM   Result Value Ref Range    Occult blood, stool Positive (A) NEG     GLUCOSE, POC    Collection Time: 05/16/22  1:34 PM   Result Value Ref Range    Glucose (POC) 60 (L) 70 - 110 mg/dL   GLUCOSE, POC    Collection Time: 05/16/22  1:37 PM   Result Value Ref Range    Glucose (POC) 61 (L) 70 - 110 mg/dL   GLUCOSE, POC    Collection Time: 05/16/22  1:49 PM   Result Value Ref Range    Glucose (POC) 49 (LL) 70 - 110 mg/dL   GLUCOSE, POC    Collection Time: 05/16/22  1:50 PM   Result Value Ref Range    Glucose (POC) 53 (LL) 70 - 110 mg/dL   GLUCOSE, POC    Collection Time: 05/16/22  2:14 PM   Result Value Ref Range    Glucose (POC) 53 (LL) 70 - 110 mg/dL   GLUCOSE, POC    Collection Time: 05/16/22  2:15 PM   Result Value Ref Range    Glucose (POC) 51 (LL) 70 - 110 mg/dL   GLUCOSE, POC    Collection Time: 05/16/22  2:34 PM   Result Value Ref Range    Glucose (POC) 56 (L) 70 - 110 mg/dL   GLUCOSE, POC    Collection Time: 05/16/22  2:56 PM   Result Value Ref Range    Glucose (POC) 126 (H) 70 - 110 mg/dL         Radiologic Studies -   CT ABD PELV WO CONT   Final Result      1. No acute intra-abdominal abnormality. Colonic diverticulosis without acute   diverticulitis. * **  *              Medical Decision Making   I am the first provider for this patient. I reviewed the vital signs, available nursing notes, past medical history, past surgical history, family history and social history. Vital Signs-Reviewed the patient's vital signs.     EKG: Nondiagnostic for ischemia, slightly peaked T waves     Records Reviewed: Nursing Notes and Old Medical Records (Time of Review: 10:23 AM)    Provider Notes (Medical Decision Making):   MDM  82-year-old male here for left lower quadrant abdominal pain, anal pain and anal leakage consider constipation, diarrhea, proctitis, overall lower suspicion for intra-abdominal infection such as diverticulitis    ED Course: Progress Notes, Reevaluation, and Consults:  Patient is afebrile hemodynamically normal  Resting comfortably, nondistressed  Will screen labs, dry CT abdomen pelvis, although patient has dialysis scheduled for later in the setting of the worldwide IV contrast shortage dry skin should be sufficient for diagnosing his condition. Do not think that this patient has mesenteric ischemia    CBC shows no anemia, his hemoglobin is trended down to 7.4, he has been this low in the past but had previously been a little bit higher, rectal exam shows just some soft brown stool, but is Hemoccult positive, no evidence of any melena by history or by exam.  Discussed with Dr. Otf Ricardo with GI who would like to see the patient in the office but agrees patient does not need to be admitted    CMP shows hyperkalemia of 6.2 with peaked T waves, will need emergent dialysis because of EKG finding. Have temporized with calcium, albuterol, insulin and glucose, Lokelma. Patient required additional dose of glucose for hypoglycemia. CT abdomen pelvis without acute pathology    Patient signed out to Dr. Karen Jones pending dialysis reevaluation, appreciate his assistance, I will be the provider of note for this patient           Procedures    Critical Care Time: 45 minutes of critical care time for acute hyperkalemia    Diagnosis     Clinical Impression:   1. ESRD on hemodialysis ([address]    2. Diarrhea, unspecified type    3.  Acute hyperkalemia        Disposition: TBD    Follow-up Information     Follow up With Specialties Details Why Contact Info    Lisa Burgess MD Gastroenterology Schedule an appointment as soon as possible for a visit   [address] [place] [facility] [address]      [facility] EMERGENCY DEPT Emergency Medicine  As needed, If symptoms worsen [address] [place] [address] [phone]           Patient's Medications Start Taking    No medications on file   Continue Taking    AMLODIPINE (NORVASC) 10 MG TABLET    Take 1 Tab by mouth daily. ASPIRIN DELAYED-RELEASE 81 MG TABLET    Take 1 Tablet by mouth daily. CARVEDILOL (COREG) 25 MG TABLET    Take 1 Tab by mouth two (2) times daily (with meals). DOXAZOSIN (CARDURA) 4 MG TABLET    Take 2 mg by mouth daily. FUROSEMIDE (LASIX) 80 MG TABLET    Take 1 Tab by mouth two (2) times a day. HYDRALAZINE (APRESOLINE) 25 MG TABLET    Take 1 Tab by mouth three (3) times daily. ISOSORBIDE MONONITRATE ER (IMDUR) 60 MG CR TABLET    Take 1 Tab by mouth daily. OMEPRAZOLE (PRILOSEC) 40 MG CAPSULE    Take 40 mg by mouth daily. SEVELAMER CARBONATE (RENVELA) 800 MG TAB TAB    Take 800 mg by mouth three (3) times daily (with meals). SODIUM BICARBONATE 650 MG TABLET    Take 650 mg by mouth three (3) times daily. SODIUM HYPOCHLORITE 0.0125% (DAKINS SOLUTION)    Apply 1 mL to affected area two (2) times a week. These Medications have changed    No medications on file   Stop Taking    No medications on file       Jessica Arias MD   Emergency Medicine   May 16, 2022, 10:23 AM     This note is dictated utilizing Dragon voice recognition software. Unfortunately this leads to occasional typographical errors using the voice recognition. I apologize in advance if the situation occurs. If questions occur please do not hesitate to contact me directly.     Merlinda Gallop, MD

## 2022-05-16 NOTE — ED NOTES
Pt reports rectal pain x2 days and that he noticed some mucus in his stool. Pt is a dialysis pt and is anuric. Pt reports he is supposed to go to dialysis today. Pt states last treatment was x4 days ago and was a full treatment. Pt is speaking in complete clear sentences.  Pt appears to be in NAD

## 2022-05-17 NOTE — ED PROVIDER NOTES
Patient accepted at signout from Dr. Liberty Dalal. Pt is pending dialysis.   Dialysis has been initated but will not finish to 2030    Dialysis completed  No incidents during my time of care  Pt feeling better, ready for DC home

## 2022-05-17 NOTE — PROGRESS NOTES
3L removed, dialysis tolerated well. Bleeding controlled after needles pulled, and pt left with gauze and tape to site. Alert and oriented, watching tv. NAHEED Trevino given report.

## 2022-05-19 LAB
ATRIAL RATE: 88 BPM
CALCULATED P AXIS, ECG09: 82 DEGREES
CALCULATED R AXIS, ECG10: 27 DEGREES
CALCULATED T AXIS, ECG11: 102 DEGREES
DIAGNOSIS, 93000: NORMAL
P-R INTERVAL, ECG05: 158 MS
Q-T INTERVAL, ECG07: 402 MS
QRS DURATION, ECG06: 84 MS
QTC CALCULATION (BEZET), ECG08: 486 MS
VENTRICULAR RATE, ECG03: 88 BPM

## 2022-05-29 NOTE — PROGRESS NOTES
Occupational Therapy Screening:  Services are indicated. Evaluate and Treat Order is requested. An [facility] screening referral was triggered for occupational therapy based on results obtained during the nursing admission assessment. The patients chart was reviewed and the patient is appropriate for a skilled therapy evaluation. Pt admitted d/t SOB and diarrhea. Pt w/ combined sCHF and dCHF as well as kidney failure & BKA. Pt will likely have endurance deficits and benefit from OT for [employer]/Work Simplification Techniques as well as adaptive strategies. Please order a consult for occupational therapy if you are in agreement and would like an evaluation to be completed. Thank you.   Aury Connelly, OTR/L 29-May-2022 23:31

## 2022-06-01 ENCOUNTER — APPOINTMENT (OUTPATIENT)
Dept: GENERAL RADIOLOGY | Age: 52
DRG: 270 | End: 2022-06-01
Attending: INTERNAL MEDICINE
Payer: MEDICARE

## 2022-06-01 ENCOUNTER — APPOINTMENT (OUTPATIENT)
Dept: MRI IMAGING | Age: 52
DRG: 270 | End: 2022-06-01
Attending: HOSPITALIST
Payer: MEDICARE

## 2022-06-01 ENCOUNTER — APPOINTMENT (OUTPATIENT)
Dept: INTERVENTIONAL RADIOLOGY/VASCULAR | Age: 52
DRG: 270 | End: 2022-06-01
Attending: EMERGENCY MEDICINE
Payer: MEDICARE

## 2022-06-01 ENCOUNTER — APPOINTMENT (OUTPATIENT)
Dept: CT IMAGING | Age: 52
DRG: 270 | End: 2022-06-01
Attending: EMERGENCY MEDICINE
Payer: MEDICARE

## 2022-06-01 ENCOUNTER — APPOINTMENT (OUTPATIENT)
Dept: VASCULAR SURGERY | Age: 52
DRG: 270 | End: 2022-06-01
Attending: EMERGENCY MEDICINE
Payer: MEDICARE

## 2022-06-01 ENCOUNTER — HOSPITAL ENCOUNTER (INPATIENT)
Age: 52
LOS: 6 days | Discharge: HOME HEALTH | DRG: 270 | End: 2022-06-07
Attending: EMERGENCY MEDICINE | Admitting: HOSPITALIST
Payer: MEDICARE

## 2022-06-01 DIAGNOSIS — Z99.2 ESRD (END STAGE RENAL DISEASE) ON DIALYSIS (HCC): ICD-10-CM

## 2022-06-01 DIAGNOSIS — T82.868A THROMBOSIS OF ARTERIOVENOUS DIALYSIS FISTULA, INITIAL ENCOUNTER (HCC): ICD-10-CM

## 2022-06-01 DIAGNOSIS — E87.5 HYPERKALEMIA: ICD-10-CM

## 2022-06-01 DIAGNOSIS — E16.2 HYPOGLYCEMIA: Primary | ICD-10-CM

## 2022-06-01 DIAGNOSIS — N18.6 ESRD (END STAGE RENAL DISEASE) ON DIALYSIS (HCC): ICD-10-CM

## 2022-06-01 PROBLEM — N18.9 ACUTE ON CHRONIC RENAL INSUFFICIENCY: Status: RESOLVED | Noted: 2018-02-28 | Resolved: 2022-06-01

## 2022-06-01 PROBLEM — T82.898A AV FISTULA OCCLUSION (HCC): Status: ACTIVE | Noted: 2022-06-01

## 2022-06-01 PROBLEM — N28.9 ACUTE ON CHRONIC RENAL INSUFFICIENCY: Status: RESOLVED | Noted: 2018-02-28 | Resolved: 2022-06-01

## 2022-06-01 PROBLEM — K62.89 INFECTIVE PROCTITIS: Status: RESOLVED | Noted: 2020-12-24 | Resolved: 2022-06-01

## 2022-06-01 PROBLEM — I16.9 HYPERTENSIVE CRISIS: Status: RESOLVED | Noted: 2021-08-27 | Resolved: 2022-06-01

## 2022-06-01 PROBLEM — I10 HYPERTENSION: Status: ACTIVE | Noted: 2022-06-01

## 2022-06-01 PROBLEM — E87.1 HYPONATREMIA: Status: RESOLVED | Noted: 2018-02-28 | Resolved: 2022-06-01

## 2022-06-01 PROBLEM — I16.0 HYPERTENSIVE URGENCY: Status: RESOLVED | Noted: 2020-12-22 | Resolved: 2022-06-01

## 2022-06-01 PROBLEM — L02.619 CELLULITIS AND ABSCESS OF FOOT: Status: RESOLVED | Noted: 2020-01-29 | Resolved: 2022-06-01

## 2022-06-01 PROBLEM — E11.649 HYPOGLYCEMIA ASSOCIATED WITH DIABETES (HCC): Status: RESOLVED | Noted: 2021-08-27 | Resolved: 2022-06-01

## 2022-06-01 PROBLEM — L03.119 CELLULITIS AND ABSCESS OF FOOT: Status: RESOLVED | Noted: 2020-01-29 | Resolved: 2022-06-01

## 2022-06-01 LAB
ALBUMIN SERPL-MCNC: 3 G/DL (ref 3.4–5)
ALBUMIN/GLOB SERPL: 0.7 {RATIO} (ref 0.8–1.7)
ALP SERPL-CCNC: 86 U/L (ref 45–117)
ALT SERPL-CCNC: 61 U/L (ref 16–61)
ANION GAP SERPL CALC-SCNC: 18 MMOL/L (ref 3–18)
ANION GAP SERPL CALC-SCNC: 19 MMOL/L (ref 3–18)
AST SERPL-CCNC: 15 U/L (ref 10–38)
ATRIAL RATE: 99 BPM
BASOPHILS # BLD: 0 K/UL (ref 0–0.1)
BASOPHILS NFR BLD: 1 % (ref 0–2)
BILIRUB SERPL-MCNC: 0.4 MG/DL (ref 0.2–1)
BUN SERPL-MCNC: 135 MG/DL (ref 7–18)
BUN SERPL-MCNC: 141 MG/DL (ref 7–18)
BUN/CREAT SERPL: 8 (ref 12–20)
BUN/CREAT SERPL: 8 (ref 12–20)
CALCIUM SERPL-MCNC: 7.6 MG/DL (ref 8.5–10.1)
CALCIUM SERPL-MCNC: 7.7 MG/DL (ref 8.5–10.1)
CALCULATED P AXIS, ECG09: 79 DEGREES
CALCULATED R AXIS, ECG10: 24 DEGREES
CALCULATED T AXIS, ECG11: 68 DEGREES
CHLORIDE SERPL-SCNC: 104 MMOL/L (ref 100–111)
CHLORIDE SERPL-SCNC: 106 MMOL/L (ref 100–111)
CO2 SERPL-SCNC: 13 MMOL/L (ref 21–32)
CO2 SERPL-SCNC: 14 MMOL/L (ref 21–32)
CREAT SERPL-MCNC: 17 MG/DL (ref 0.6–1.3)
CREAT SERPL-MCNC: 17.6 MG/DL (ref 0.6–1.3)
DIAGNOSIS, 93000: NORMAL
DIFFERENTIAL METHOD BLD: ABNORMAL
EOSINOPHIL # BLD: 0.1 K/UL (ref 0–0.4)
EOSINOPHIL NFR BLD: 2 % (ref 0–5)
ERYTHROCYTE [DISTWIDTH] IN BLOOD BY AUTOMATED COUNT: 18.4 % (ref 11.6–14.5)
EST. AVERAGE GLUCOSE BLD GHB EST-MCNC: ABNORMAL MG/DL
GLOBULIN SER CALC-MCNC: 4.5 G/DL (ref 2–4)
GLUCOSE BLD STRIP.AUTO-MCNC: 101 MG/DL (ref 70–110)
GLUCOSE BLD STRIP.AUTO-MCNC: 110 MG/DL (ref 70–110)
GLUCOSE BLD STRIP.AUTO-MCNC: 114 MG/DL (ref 70–110)
GLUCOSE BLD STRIP.AUTO-MCNC: 45 MG/DL (ref 70–110)
GLUCOSE BLD STRIP.AUTO-MCNC: 46 MG/DL (ref 70–110)
GLUCOSE BLD STRIP.AUTO-MCNC: 46 MG/DL (ref 70–110)
GLUCOSE BLD STRIP.AUTO-MCNC: 49 MG/DL (ref 70–110)
GLUCOSE BLD STRIP.AUTO-MCNC: 52 MG/DL (ref 70–110)
GLUCOSE BLD STRIP.AUTO-MCNC: 72 MG/DL (ref 70–110)
GLUCOSE BLD STRIP.AUTO-MCNC: 79 MG/DL (ref 70–110)
GLUCOSE SERPL-MCNC: 56 MG/DL (ref 74–99)
GLUCOSE SERPL-MCNC: 81 MG/DL (ref 74–99)
HBA1C MFR BLD: <3.8 % (ref 4.2–5.6)
HCT VFR BLD AUTO: 23.6 % (ref 36–48)
HGB BLD-MCNC: 7.9 G/DL (ref 13–16)
IMM GRANULOCYTES # BLD AUTO: 0 K/UL (ref 0–0.04)
IMM GRANULOCYTES NFR BLD AUTO: 1 % (ref 0–0.5)
LYMPHOCYTES # BLD: 0.3 K/UL (ref 0.9–3.6)
LYMPHOCYTES NFR BLD: 5 % (ref 21–52)
MAGNESIUM SERPL-MCNC: 3.5 MG/DL (ref 1.6–2.6)
MCH RBC QN AUTO: 26.2 PG (ref 24–34)
MCHC RBC AUTO-ENTMCNC: 33.5 G/DL (ref 31–37)
MCV RBC AUTO: 78.4 FL (ref 78–100)
MONOCYTES # BLD: 0.6 K/UL (ref 0.05–1.2)
MONOCYTES NFR BLD: 9 % (ref 3–10)
NEUTS SEG # BLD: 5 K/UL (ref 1.8–8)
NEUTS SEG NFR BLD: 83 % (ref 40–73)
NRBC # BLD: 0 K/UL (ref 0–0.01)
NRBC BLD-RTO: 0 PER 100 WBC
P-R INTERVAL, ECG05: 164 MS
PLATELET # BLD AUTO: 253 K/UL (ref 135–420)
PMV BLD AUTO: 10.1 FL (ref 9.2–11.8)
POTASSIUM SERPL-SCNC: 6.2 MMOL/L (ref 3.5–5.5)
POTASSIUM SERPL-SCNC: 6.3 MMOL/L (ref 3.5–5.5)
PROT SERPL-MCNC: 7.5 G/DL (ref 6.4–8.2)
Q-T INTERVAL, ECG07: 372 MS
QRS DURATION, ECG06: 88 MS
QTC CALCULATION (BEZET), ECG08: 477 MS
RBC # BLD AUTO: 3.01 M/UL (ref 4.35–5.65)
SODIUM SERPL-SCNC: 137 MMOL/L (ref 136–145)
SODIUM SERPL-SCNC: 137 MMOL/L (ref 136–145)
VENTRICULAR RATE, ECG03: 99 BPM
WBC # BLD AUTO: 6.1 K/UL (ref 4.6–13.2)

## 2022-06-01 PROCEDURE — 82962 GLUCOSE BLOOD TEST: CPT

## 2022-06-01 PROCEDURE — 71045 X-RAY EXAM CHEST 1 VIEW: CPT

## 2022-06-01 PROCEDURE — 96375 TX/PRO/DX INJ NEW DRUG ADDON: CPT

## 2022-06-01 PROCEDURE — 02HV33Z INSERTION OF INFUSION DEVICE INTO SUPERIOR VENA CAVA, PERCUTANEOUS APPROACH: ICD-10-PCS | Performed by: RADIOLOGY

## 2022-06-01 PROCEDURE — 74011250637 HC RX REV CODE- 250/637: Performed by: HOSPITALIST

## 2022-06-01 PROCEDURE — 99285 EMERGENCY DEPT VISIT HI MDM: CPT

## 2022-06-01 PROCEDURE — 74011000250 HC RX REV CODE- 250: Performed by: EMERGENCY MEDICINE

## 2022-06-01 PROCEDURE — 85025 COMPLETE CBC W/AUTO DIFF WBC: CPT

## 2022-06-01 PROCEDURE — C1752 CATH,HEMODIALYSIS,SHORT-TERM: HCPCS

## 2022-06-01 PROCEDURE — 80053 COMPREHEN METABOLIC PANEL: CPT

## 2022-06-01 PROCEDURE — 65610000006 HC RM INTENSIVE CARE

## 2022-06-01 PROCEDURE — 77001 FLUOROGUIDE FOR VEIN DEVICE: CPT

## 2022-06-01 PROCEDURE — 74011636637 HC RX REV CODE- 636/637: Performed by: EMERGENCY MEDICINE

## 2022-06-01 PROCEDURE — 74011250636 HC RX REV CODE- 250/636: Performed by: RADIOLOGY

## 2022-06-01 PROCEDURE — 70450 CT HEAD/BRAIN W/O DYE: CPT

## 2022-06-01 PROCEDURE — 83036 HEMOGLOBIN GLYCOSYLATED A1C: CPT

## 2022-06-01 PROCEDURE — 5A1D70Z PERFORMANCE OF URINARY FILTRATION, INTERMITTENT, LESS THAN 6 HOURS PER DAY: ICD-10-PCS | Performed by: INTERNAL MEDICINE

## 2022-06-01 PROCEDURE — 87340 HEPATITIS B SURFACE AG IA: CPT

## 2022-06-01 PROCEDURE — 90935 HEMODIALYSIS ONE EVALUATION: CPT

## 2022-06-01 PROCEDURE — 74011000250 HC RX REV CODE- 250: Performed by: HOSPITALIST

## 2022-06-01 PROCEDURE — 86706 HEP B SURFACE ANTIBODY: CPT

## 2022-06-01 PROCEDURE — 96366 THER/PROPH/DIAG IV INF ADDON: CPT

## 2022-06-01 PROCEDURE — 74011250636 HC RX REV CODE- 250/636: Performed by: EMERGENCY MEDICINE

## 2022-06-01 PROCEDURE — 96365 THER/PROPH/DIAG IV INF INIT: CPT

## 2022-06-01 PROCEDURE — 70551 MRI BRAIN STEM W/O DYE: CPT

## 2022-06-01 PROCEDURE — 74011000250 HC RX REV CODE- 250: Performed by: RADIOLOGY

## 2022-06-01 PROCEDURE — 93931 UPPER EXTREMITY STUDY: CPT

## 2022-06-01 PROCEDURE — 74011250636 HC RX REV CODE- 250/636: Performed by: INTERNAL MEDICINE

## 2022-06-01 PROCEDURE — 83735 ASSAY OF MAGNESIUM: CPT

## 2022-06-01 PROCEDURE — 93005 ELECTROCARDIOGRAM TRACING: CPT

## 2022-06-01 RX ORDER — SODIUM CHLORIDE 0.9 % (FLUSH) 0.9 %
5-40 SYRINGE (ML) INJECTION AS NEEDED
Status: DISCONTINUED | OUTPATIENT
Start: 2022-06-01 | End: 2022-06-07 | Stop reason: HOSPADM

## 2022-06-01 RX ORDER — ACETAMINOPHEN 325 MG/1
650 TABLET ORAL
Status: DISCONTINUED | OUTPATIENT
Start: 2022-06-01 | End: 2022-06-07 | Stop reason: HOSPADM

## 2022-06-01 RX ORDER — AMLODIPINE BESYLATE 5 MG/1
10 TABLET ORAL DAILY
Status: DISCONTINUED | OUTPATIENT
Start: 2022-06-01 | End: 2022-06-02

## 2022-06-01 RX ORDER — HYDRALAZINE HYDROCHLORIDE 50 MG/1
50 TABLET, FILM COATED ORAL 2 TIMES DAILY
Status: DISCONTINUED | OUTPATIENT
Start: 2022-06-01 | End: 2022-06-02

## 2022-06-01 RX ORDER — ASPIRIN 81 MG/1
81 TABLET ORAL DAILY
Status: DISCONTINUED | OUTPATIENT
Start: 2022-06-02 | End: 2022-06-07 | Stop reason: HOSPADM

## 2022-06-01 RX ORDER — HEPARIN SODIUM 5000 [USP'U]/ML
5000 INJECTION, SOLUTION INTRAVENOUS; SUBCUTANEOUS EVERY 8 HOURS
Status: DISCONTINUED | OUTPATIENT
Start: 2022-06-02 | End: 2022-06-07 | Stop reason: HOSPADM

## 2022-06-01 RX ORDER — ISOSORBIDE MONONITRATE 60 MG/1
60 TABLET, EXTENDED RELEASE ORAL DAILY
Status: DISCONTINUED | OUTPATIENT
Start: 2022-06-02 | End: 2022-06-07 | Stop reason: HOSPADM

## 2022-06-01 RX ORDER — HYDRALAZINE HYDROCHLORIDE 50 MG/1
50 TABLET, FILM COATED ORAL 2 TIMES DAILY
Status: DISCONTINUED | OUTPATIENT
Start: 2022-06-01 | End: 2022-06-01

## 2022-06-01 RX ORDER — ONDANSETRON 4 MG/1
4 TABLET, ORALLY DISINTEGRATING ORAL
Status: DISCONTINUED | OUTPATIENT
Start: 2022-06-01 | End: 2022-06-07 | Stop reason: HOSPADM

## 2022-06-01 RX ORDER — HEPARIN SODIUM 5000 [USP'U]/ML
2000 INJECTION, SOLUTION INTRAVENOUS; SUBCUTANEOUS
Status: DISCONTINUED | OUTPATIENT
Start: 2022-06-01 | End: 2022-06-07 | Stop reason: HOSPADM

## 2022-06-01 RX ORDER — ACETAMINOPHEN 650 MG/1
650 SUPPOSITORY RECTAL
Status: DISCONTINUED | OUTPATIENT
Start: 2022-06-01 | End: 2022-06-07 | Stop reason: HOSPADM

## 2022-06-01 RX ORDER — SEVELAMER CARBONATE 800 MG/1
800 TABLET, FILM COATED ORAL
Status: DISCONTINUED | OUTPATIENT
Start: 2022-06-01 | End: 2022-06-07 | Stop reason: HOSPADM

## 2022-06-01 RX ORDER — PANTOPRAZOLE SODIUM 40 MG/1
40 TABLET, DELAYED RELEASE ORAL
Status: DISCONTINUED | OUTPATIENT
Start: 2022-06-02 | End: 2022-06-07 | Stop reason: HOSPADM

## 2022-06-01 RX ORDER — DEXTROSE MONOHYDRATE 100 MG/ML
250 INJECTION, SOLUTION INTRAVENOUS ONCE
Status: DISPENSED | OUTPATIENT
Start: 2022-06-01 | End: 2022-06-01

## 2022-06-01 RX ORDER — AMLODIPINE BESYLATE 5 MG/1
10 TABLET ORAL DAILY
Status: DISCONTINUED | OUTPATIENT
Start: 2022-06-02 | End: 2022-06-01

## 2022-06-01 RX ORDER — DOXAZOSIN 1 MG/1
2 TABLET ORAL DAILY
Status: DISCONTINUED | OUTPATIENT
Start: 2022-06-02 | End: 2022-06-02

## 2022-06-01 RX ORDER — ONDANSETRON 2 MG/ML
4 INJECTION INTRAMUSCULAR; INTRAVENOUS
Status: DISCONTINUED | OUTPATIENT
Start: 2022-06-01 | End: 2022-06-07 | Stop reason: HOSPADM

## 2022-06-01 RX ORDER — SODIUM CHLORIDE 0.9 % (FLUSH) 0.9 %
5-40 SYRINGE (ML) INJECTION EVERY 8 HOURS
Status: DISCONTINUED | OUTPATIENT
Start: 2022-06-01 | End: 2022-06-04 | Stop reason: SDUPTHER

## 2022-06-01 RX ORDER — CALCIUM GLUCONATE 20 MG/ML
1 INJECTION, SOLUTION INTRAVENOUS ONCE
Status: COMPLETED | OUTPATIENT
Start: 2022-06-01 | End: 2022-06-01

## 2022-06-01 RX ORDER — FUROSEMIDE 40 MG/1
80 TABLET ORAL 2 TIMES DAILY
Status: DISCONTINUED | OUTPATIENT
Start: 2022-06-01 | End: 2022-06-01

## 2022-06-01 RX ORDER — HEPARIN SODIUM 1000 [USP'U]/ML
10000 INJECTION, SOLUTION INTRAVENOUS; SUBCUTANEOUS
Status: COMPLETED | OUTPATIENT
Start: 2022-06-01 | End: 2022-06-01

## 2022-06-01 RX ORDER — FUROSEMIDE 40 MG/1
80 TABLET ORAL 2 TIMES DAILY
Status: DISCONTINUED | OUTPATIENT
Start: 2022-06-01 | End: 2022-06-07 | Stop reason: HOSPADM

## 2022-06-01 RX ORDER — POLYETHYLENE GLYCOL 3350 17 G/17G
17 POWDER, FOR SOLUTION ORAL DAILY PRN
Status: DISCONTINUED | OUTPATIENT
Start: 2022-06-01 | End: 2022-06-07 | Stop reason: HOSPADM

## 2022-06-01 RX ORDER — CARVEDILOL 12.5 MG/1
25 TABLET ORAL 2 TIMES DAILY WITH MEALS
Status: DISCONTINUED | OUTPATIENT
Start: 2022-06-01 | End: 2022-06-02

## 2022-06-01 RX ORDER — LIDOCAINE HYDROCHLORIDE 10 MG/ML
1-30 INJECTION, SOLUTION EPIDURAL; INFILTRATION; INTRACAUDAL; PERINEURAL
Status: COMPLETED | OUTPATIENT
Start: 2022-06-01 | End: 2022-06-01

## 2022-06-01 RX ORDER — HEPARIN SODIUM 1000 [USP'U]/ML
2600 INJECTION, SOLUTION INTRAVENOUS; SUBCUTANEOUS
Status: DISCONTINUED | OUTPATIENT
Start: 2022-06-01 | End: 2022-06-07 | Stop reason: HOSPADM

## 2022-06-01 RX ORDER — HEPARIN SODIUM 200 [USP'U]/100ML
500 INJECTION, SOLUTION INTRAVENOUS
Status: COMPLETED | OUTPATIENT
Start: 2022-06-01 | End: 2022-06-01

## 2022-06-01 RX ADMIN — CALCIUM GLUCONATE 1000 MG: 20 INJECTION, SOLUTION INTRAVENOUS at 12:26

## 2022-06-01 RX ADMIN — LIDOCAINE HYDROCHLORIDE 4 ML: 10 INJECTION, SOLUTION EPIDURAL; INFILTRATION; INTRACAUDAL; PERINEURAL at 15:26

## 2022-06-01 RX ADMIN — GLUCAGON HYDROCHLORIDE 1 MG: 1 INJECTION, POWDER, FOR SOLUTION INTRAMUSCULAR; INTRAVENOUS; SUBCUTANEOUS at 10:39

## 2022-06-01 RX ADMIN — INSULIN HUMAN 10 UNITS: 100 INJECTION, SOLUTION PARENTERAL at 12:29

## 2022-06-01 RX ADMIN — HEPARIN SODIUM 2600 UNITS: 1000 INJECTION INTRAVENOUS; SUBCUTANEOUS at 21:13

## 2022-06-01 RX ADMIN — HEPARIN SODIUM IN SODIUM CHLORIDE 1000 UNITS: 200 INJECTION INTRAVENOUS at 15:27

## 2022-06-01 RX ADMIN — SEVELAMER CARBONATE 800 MG: 800 TABLET, FILM COATED ORAL at 18:16

## 2022-06-01 RX ADMIN — HEPARIN SODIUM 6000 UNITS: 1000 INJECTION INTRAVENOUS; SUBCUTANEOUS at 15:27

## 2022-06-01 RX ADMIN — DEXTROSE MONOHYDRATE 250 ML: 100 INJECTION, SOLUTION INTRAVENOUS at 12:30

## 2022-06-01 RX ADMIN — SODIUM CHLORIDE, PRESERVATIVE FREE 10 ML: 5 INJECTION INTRAVENOUS at 22:00

## 2022-06-01 RX ADMIN — DEXTROSE MONOHYDRATE 250 ML: 100 INJECTION, SOLUTION INTRAVENOUS at 17:02

## 2022-06-01 RX ADMIN — FUROSEMIDE 80 MG: 40 TABLET ORAL at 18:17

## 2022-06-01 RX ADMIN — HYDRALAZINE HYDROCHLORIDE 50 MG: 50 TABLET, FILM COATED ORAL at 18:17

## 2022-06-01 RX ADMIN — GLUCAGON HYDROCHLORIDE 1 MG: 1 INJECTION, POWDER, FOR SOLUTION INTRAMUSCULAR; INTRAVENOUS; SUBCUTANEOUS at 10:22

## 2022-06-01 NOTE — PROCEDURES
Interventional Radiology Brief Procedure Note    Interventional Radiologist: Jia Gomez MD    Pre-operative Diagnosis: Renal failure    Post-operative Diagnosis: Same as pre-operative diagnosis    Procedure(s) Performed:  Nontunneled dialysis catheter placement    Anesthesia:  Local and Moderate Sedation    Findings: Nontunneled dialysis catheter placed via RIJV. Ready for immediate use.      Complications: None    Estimated Blood Loss:  minimal    Specimens: None    Condition: Good    Jia Gomez MD  [facility]  6/1/2022

## 2022-06-01 NOTE — ED NOTES
Pt arrives via ems stretcher with co low BGL, per ems pt bgl was 30, they administered oral glucose x 2 and last BGL PTA was 42, pt arrives with GCS of 12, obtained V/O for 1 mg of glucagon IM and administered

## 2022-06-01 NOTE — PROGRESS NOTES
I have discussed with the patient the risk, indication and benefit of performing dialysis, alternative methods of treatment (including risks of such alternatives), and the consequences if no treatment is undertaken.      [address] Ko, DO

## 2022-06-01 NOTE — H&P
History & Physical    Patient: Parth Cordon MRN: [id number]  CSN: [id number]    YOB: 1970  Age: 54 y.o. Sex: male      DOA: 6/1/2022  Primary Care Provider:  Murriel Klinefelter, MD      Assessment/Plan     Hospital Problems  Date Reviewed: 1/30/2020          Codes Class Noted POA    Hypertension ICD-10-CM: I10  ICD-9-CM: 401.9  6/1/2022 Unknown        AV fistula occlusion ([address] ICD-10-CM: N63.976Y  ICD-9-CM: 996.74  6/1/2022 Unknown        * (Principal) Hyperkalemia ICD-10-CM: E87.5  ICD-9-CM: 276.7  8/27/2021 Unknown        Hypoglycemia ICD-10-CM: E16.2  ICD-9-CM: 251.2  1/30/2020 Unknown        ESRD (end stage renal disease) on dialysis [facility]) ICD-10-CM: N18.6, Z99.2  ICD-9-CM: 585.6, V45.11  1/29/2020 Unknown        Hx of BKA, right ([facility] [address] ICD-10-CM: Z89.511  ICD-9-CM: V49.75  1/29/2020 Yes              Admit to icu     CRITICAL CARE PLAN    Resp -   So far remaining airway,   Elevated  HOB>30 degrees, aspiration precaution     ID -   Hx of c diff, so far no source of infection noted -will hold abx for now     [facility]   Hypertension , hypertensive urgency   Not taking meds today will continue if mri brain no acute stroke   HD will help. Restart home medication       chf   Continue home medication if no stroke from MRI brain     Renal -   Hyperkalemia 6.2-6.3-due to missing HD secondary due to AV fistula occlusion  Received calcium gluconate and insulin with glucagon in ER to shift potassium, need urgent HD , IR consulted for tunneled catheter placement. Case discussed with Dr. William Pleitez -will give urgent HD tonight   Continue monitoring K level     esrd on hd -need urgent HD, renal on board     Av-fistular dysfunction-occlusion   Received procedure at [facility], Will defer to nephrologist for further work up     Heme/onc - Follow H&H, plts.  INR  Anemia -due to esrd , no bleeding reported   Will continue monitoring h/h      Sickle cell trait     Endocrine -   Hypoglycemia  Resolved now, continue hypoglycemia protocol, will start feeding patient after procedure   follow FSG    Neuro  Acute encephalopathy, LOS   Like due to hypoglycemia. CT head :Right central arden hypodensity, new from prior study  Will have mri brain   Continue neuro check     GI - NPO for procedure, ppi     Prophylaxis - heparin-hold for now till mri brain results       bka rt :fall precaution     80 minutes of critical care time spent in the direct evaluation and treatment of this high risk patient. The reason for providing this level of medical care for this critically ill patient was due a critical illness that impaired one or more vital organ systems such that there was a high probability of imminent or life threatening deterioration in the patients condition. This care involved high complexity decision making to assess, manipulate, and support vital system functions, to treat this degreee vital organ system failure and to prevent further life threatening deterioration of the patients condition. Please note that this dictation was completed with Spread[employer], the [employer] voice recognition software. Quite often unanticipated grammatical, syntax, homophones, and other interpretive errors are inadvertently transcribed by the computer software. Please disregard these errors. Please excuse any errors that have escaped final proofreading    Estimate  length of stay : tbd     DVT : heparin ppi proph  CC: confusion        HPI:     Allegra Hyman is a 54 y.o. male with end-stage renal disease on HD, diabetes, hypertension, right BKA presented to ER due to mental status changes. Patient was found  severe hypoglycemia at home, his glucose was 30s per EMS reported, he received oral glucose, and glucagon IM per EMS before sending to ER. His glucose was 42 in ER, he received D10, he becomes more responsiveness. Patient reported he had similar episode before, he usually ate some hamburger before going to bed.   He did not eat anything last night.  He felt weakness, fatigue and  dizziness this morning. He could not remember if he lost his consciousness or not. He also reported he did not miss HD. He went to HD center, he was told his AV fistula was not able to be accessed. He was recommended to go to ER to fix it. In ER he was found hyperkalemia 6.2, cr/bun 135/ 17. Av-fistular indicated occlusion. Having difficulty for iv access in ER and tried to put midline per ER physician ordered. Nephrologist is consulted and urgent consult IR for hd access. Pt was seen and examed in ER, ct head still pending at that time. Reported received 3 dose covid 19 vaccines, not on insulin/or medication for his DM. His bp was elevated in ER-not taking medication. His brother  Checked him today. Visit Vitals  BP (!) 211/92   Pulse 92   Temp 97.5 °F (36.4 °C)   Resp 18   SpO2 100%      O2 Device: None (Room air)      Past Medical History:   Diagnosis Date    Chronic kidney disease     Diabetes [facility]     Heart failure (HCC)     Hypertension     Sickle cell trait ([facility] [address]        Past Surgical History:   Procedure Laterality Date    HX ORTHOPAEDIC      right BKA      Family History      Medical History Relation Name Comments   Diabetes Father        Sickle cell anemia Mother          Relation Name Status Comments   Father         Mother             Social History     Socioeconomic History    Marital status: LEGALLY    Tobacco Use    Smoking status: Former Smoker    Smokeless tobacco: Never Used   Substance and Sexual Activity    Alcohol use: No    Drug use: No       Prior to Admission medications    Medication Sig Start Date End Date Taking? Authorizing Provider   aspirin delayed-release 81 mg tablet Take 1 Tablet by mouth daily. 21   Mag Culver DO   doxazosin (CARDURA) 4 mg tablet Take 2 mg by mouth daily.     Provider, Historical   sodium hypochlorite 0.0125% ([employer] SOLUTION) Apply 1 mL to affected area two (2) times a week.    Provider, Historical   omeprazole (PRILOSEC) 40 mg capsule Take 40 mg by mouth daily. Provider, Historical   sevelamer carbonate (RENVELA) 800 mg tab tab Take 800 mg by mouth three (3) times daily (with meals). Provider, Historical   sodium bicarbonate 650 mg tablet Take 650 mg by mouth three (3) times daily. Provider, Historical   amLODIPine (NORVASC) 10 mg tablet Take 1 Tab by mouth daily. 3/8/18   Elfego Harkins MD   carvedilol (COREG) 25 mg tablet Take 1 Tab by mouth two (2) times daily (with meals). 3/7/18   Elfego Harkins MD   hydrALAZINE (APRESOLINE) 25 mg tablet Take 1 Tab by mouth three (3) times daily. Patient taking differently: Take 50 mg by mouth two (2) times a day. 3/7/18   Elfego Harkins MD   isosorbide mononitrate ER (IMDUR) 60 mg CR tablet Take 1 Tab by mouth daily. 3/8/18   Elfego Harkins MD   furosemide (LASIX) 80 mg tablet Take 1 Tab by mouth two (2) times a day. 3/7/18   Elfego Harkins MD       Allergies   Allergen Reactions    Penicillins Rash    Vancomycin Other (comments)     kayla syndrome       Review of Systems  Gen: No fever, chills, + malaise, weight loss/gain. Heent: No headache, rhinorrhea, epistaxis, ear pain, hearing loss, sinus pain, neck pain/stiffness, sore throat. Heart: No chest pain, palpitations, FU, pnd, or orthopnea. Resp: No cough, hemoptysis, wheezing and shortness of breath. GI: No nausea, vomiting, diarrhea, constipation, melena or hematochezia. : No urinary obstruction, dysuria or hematuria. Derm: No rash, new skin lesion or pruritis. Musc/skeletal: no bone or joint complains. Vasc: No edema, cyanosis or claudication. Endo: No heat/cold intolerance, no polyuria,polydipsia or polyphagia. Neuro: No unilateral weakness, numbness, tingling. No seizures. May be LOC , dizziness   Heme: No easy bruising or bleeding.           Physical Exam:     Physical Exam:  Visit Vitals  BP (!) 211/92   Pulse 92   Temp 97.5 °F (36.4 °C)   Resp 18   SpO2 100%      O2 Device: None (Room air)    Temp (24hrs), Av.5 °F (36.4 °C), Min:97.5 °F (36.4 °C), Max:97.5 °F (36.4 °C)    No intake/output data recorded. No intake/output data recorded. General:  Awake, cooperative, no distress. Head:  Normocephalic, without obvious abnormality, atraumatic. Eyes:  Conjunctivae/corneas clear, sclera anicteric, PERRL, EOMs intact. Nose: Nares normal. No drainage or sinus tenderness. Throat: Lips, mucosa, and tongue normal. .   Neck: Supple, symmetrical, trachea midline, no adenopathy. Lungs:   Clear to auscultation bilaterally. Heart:  Regular rate and rhythm, S1, S2 normal, no murmur, click, rub or gallop. Abdomen: Soft, non-tender. Bowel sounds normal. No masses,  No organomegaly. Extremities: Extremities normal, rt BKA no cyanosis   Pulses: 2+ and symmetric all extremities. Skin: Skin color-pink, texture, turgor normal. No rashes or lesions. Capillary refill normal    Neurologic: CNII-XII intact. No focal motor or sensory deficit.        Labs Reviewed:    BMP:   Lab Results   Component Value Date/Time     2022 01:36 PM    K 6.3 (HH) 2022 01:36 PM     2022 01:36 PM    CO2 13 (L) 2022 01:36 PM    AGAP 18 2022 01:36 PM    GLU 81 2022 01:36 PM     (H) 2022 01:36 PM    CREA 17.60 (H) 2022 01:36 PM    GFRAA 4 (L) 2022 01:36 PM    GFRNA 3 (L) 2022 01:36 PM     CMP:   Lab Results   Component Value Date/Time     2022 01:36 PM    K 6.3 (HH) 2022 01:36 PM     2022 01:36 PM    CO2 13 (L) 2022 01:36 PM    AGAP 18 2022 01:36 PM    GLU 81 2022 01:36 PM     (H) 2022 01:36 PM    CREA 17.60 (H) 2022 01:36 PM    GFRAA 4 (L) 2022 01:36 PM    GFRNA 3 (L) 2022 01:36 PM    CA 7.7 (L) 2022 01:36 PM    MG 3.5 (H) 2022 10:55 AM    ALB 3.0 (L) 2022 10:55 AM    TP 7.5 2022 10:55 AM    GLOB 4.5 (H) 2022 10:55 AM AGRAT 0.7 (L) 06/01/2022 10:55 AM    ALT 61 06/01/2022 10:55 AM     CBC:   Lab Results   Component Value Date/Time    WBC 6.1 06/01/2022 10:55 AM    HGB 7.9 (L) 06/01/2022 10:55 AM    HCT 23.6 (L) 06/01/2022 10:55 AM     06/01/2022 10:55 AM     All Cardiac Markers in the last 24 hours: No results found for: CPK, CK, CKMMB, CKMB, RCK3, CKMBT, CKNDX, CKND1, WILLIE, TROPT, TROIQ, CHANDA, TROPT, TNIPOC, BNP, BNPP  Recent Glucose Results:   Lab Results   Component Value Date/Time    GLU 81 06/01/2022 01:36 PM    GLU 56 (L) 06/01/2022 10:55 AM     ABG: No results found for: PH, PHI, PCO2, PCO2I, PO2, PO2I, HCO3, HCO3I, FIO2, FIO2I  COAGS: No results found for: APTT, PTP, INR, INREXT, INREXT  Liver Panel:   Lab Results   Component Value Date/Time    ALB 3.0 (L) 06/01/2022 10:55 AM    TP 7.5 06/01/2022 10:55 AM    GLOB 4.5 (H) 06/01/2022 10:55 AM    AGRAT 0.7 (L) 06/01/2022 10:55 AM    ALT 61 06/01/2022 10:55 AM    AP 86 06/01/2022 10:55 AM     Pancreatic Markers: No results found for: AMYLPOCT, AML, LIPPOCT, LPSE    CT ABD PELV WO CONT    Result Date: 5/16/2022  EXAM: CT of the abdomen and pelvis CLINICAL INDICATION/HISTORY: LLQ abd pain, constipation and anal pain   > Additional: None. COMPARISON: 09/01/2021   > Reference Exam: None. TECHNIQUE: Axial CT imaging of the abdomen and pelvis was performed without intravenous contrast. Oral contrast not administered. Multiplanar reformats were generated. Dose reduction techniques used: automated exposure control, adjustment of the mAs and/or kVp according to patient size, and iterative reconstruction techniques. Digital Imaging and Communications in Medicine (DICOM) format image data are available to nonaffiliated external healthcare facilities or entities on a secure, media free, reciprocally searchable basis with patient authorization for at least a 12-month period after this study. _______________ FINDINGS: LOWER CHEST: Lung bases are clear.  Heart is mildly enlarged without pericardial effusion. LIVER, BILIARY: Liver is unremarkable. No biliary dilation. Gallbladder is markedly contracted but otherwise unremarkable. PANCREAS: Unremarkable. SPLEEN: Unremarkable. ADRENALS: Unremarkable. KIDNEYS/URETERS/BLADDER: No hydronephrosis. No calculi. LYMPH NODES: No enlarged lymph nodes. GASTROINTESTINAL TRACT: No bowel dilation or wall thickening. Colonic diverticulosis. PELVIC ORGANS: Unremarkable. VASCULATURE: Moderate to marked atherosclerosis. BONES: No acute or aggressive osseous abnormalities identified. OTHER: No ascites. _______________     1. No acute intra-abdominal abnormality. Colonic diverticulosis without acute diverticulitis.  * **  *    Procedures/imaging: see electronic medical records for all procedures/Xrays and details which were not copied into this note but were reviewed prior to creation of Thi Triana MD, Internal Medicine     CC: Migel Velazquez MD

## 2022-06-01 NOTE — CONSULTS
[facility]  Pulmonary, Critical Care, and Sleep Medicine    Name: Karine Kinney MRN: [id number]   : 1970 Hospital: [facility] [place]    Date: 2022  Room: [address] Note                                              Consult requesting physician: Dr. Marilynn Sanchez  Reason for Consult: assisting with ICU care for metabolic encephalopathy, hypoglycemia    IMPRESSION:   · Encephalopathy - G93.40  · Hypoglycemia - E16.2  · RT central pontine density suspicious for new CVA - G93.9  · Hyperkalemia - E82.5  · Elevated HTN - I10  Patient Active Problem List   Diagnosis Code    Iron deficiency anemia D50.9    Other restrictive cardiomyopathy ([facility] [address] I42.5    ESRD (end stage renal disease) on dialysis ([address] N18.6, Z99.2    HTN (hypertension) I10    Hx of BKA, right ([address] Z89.511    Hypoglycemia E16.2    Hyperkalemia E87.5    Uremia N19    Non-compliance with renal dialysis ([address] Z91.15    Toxic metabolic encephalopathy O06.7    Hypertension I10    AV fistula occlusion ([facility] [address] T82.898A     · Code status: Full Code       RECOMMENDATIONS:     Respiratory: compensated respiratory status  On room air. Protecting airway. Check chest x-ray today and again in a.m. pulm follow-up  Keep SPO2 >=92%. HOB 30 degree elevation all the time. Aggressive pulmonary toileting. Aspiration precautions. Incentive spirometry when patient is agreeable. CVS: Elevated blood pressure likely related to inability to get dialyzed, ? CVA  Denies any symptoms associated with elevated blood pressure  Permissive hypertension may be required if has new CVA with goal SBP/DBP < 220/120 mm Hg  Otherwise may lower to -160 mm Hg    ID: no clinical evidence for sepsis  No fever or leukocytosis  Monitor off of antibiotic    Hematology/Oncology: Chronic anemia; stable Hgb and platelet-monitor daily CBC  No clinical evidence for bleeding anywhere    Renal: Patient may have uremia contributing to anorexia and related hypoglycemia  He is getting IR guided temporary dialysis catheter placement followed by hemodialysis today  Nephrology has been consulted  Hyperkalemia medically treated pending hemodialysis later today  Repeat BMP and electrolytes after hemodialysis per nephrology    GI/: Anorexia and chronic diarrhea  No evidence of acute abdomen on exam  PPI for GI prophylaxis    Endocrine: Monitor serial FS BS. Correct any hypoglycemia per protocol    Neurology: Abnormal CT head on admission  MRI brain ordered  US carotid ordered  Neurology consulted; await input    Pain/Sedation: Denies any    Skin/Wound: As per ICU nursing care    Electrolytes: Replace electrolytes per HD protocol on nephrology    Nutrition: Diet as tolerated    Prophylaxis: DVT Prophylaxis: SCD/heparin. GI Prophylaxis: Protonix. Restraints: none    Lines/Tubes: PIV    PT/OT eval and treat, OOB when more stable    Advance Directive/Palliative Care: Full code. Consult as per clinical course. Prognosis guarded  Will defer respective systems problem management to primary and other respective consultant and follow patient in ICU with primary and other medical team.  Further recommendations will be based on the patient's response to recommended treatment and results of the investigation ordered. Quality Care: PPI, DVT prophylaxis, HOB elevated, Infection control all reviewed and addressed. Care of plan d/w RN, hospitalist team, neurology  D/w patient (answered all questions to satisfaction). High complexity decision making was performed during the evaluation of this patient at high risk for decompensation with multiple organ involvement. Total critical care time spent rendering care exclusive of procedures/family discussion/coordination of care: 65 minutes.         Subjective/History of Present Illness:     Patient is a 54 y.o. male with PMHx significant for ESRD on HD [M-W-F], DM, CHF, hypertension, sickle cell trait, anemia, PVD status post right BKA was brought by EMS to [facility] ER for complaint of generalized weakness, confusion, hypoglycemia. On arrival to ER, FSBS was 46 and responded to medical intervention with improvement in patient's mental status. He reports generalized weakness started about 2 days ago PTA associated with somnolence. Today he woke up feeling more generalized weak. His friend over the phone reported him speaking slurred and alerted his brother. While trying to get out of home he could not remain standing and fell on floor before he could come to [facility] for his appointment regarding AV fistula malfunction. When his brother arrived at his place found him on the floor and called EMS. He has chronic diarrhea that has worsened over the last 1 month and reports weight loss. Denies HA, neck stiffness, photophobia, sore throat, sinus pain or discharge, cough, phlegm, dysuria, nausea, vomiting, rash, adenopathy, leg swelling or calf tenderness, urethral discharge. Has off-and-on dyspnea on exertion while denies cough, chest pain, wheezing or hemoptysis, palpitations, syncope, orthopnea, worsening of chronic right leg edema or paroxysmal nocturnal dyspnea. In ER, found to have elevated K+, S. Cr while low Hgb, blood glucose. Patient reports COVID vaccination with booster recently. Nephrology has been consulted to arrange HD. CT head showed abnormal density in the right central pontine area and nephrology has been consulted. Patient seen at bedside in ER room #3               I/O last 24 hrs: No intake or output data in the 24 hours ending 06/01/22 2549    History taken from patient, EMR    Review of Systems:  A comprehensive review of systems was negative except for that written in the HPI.        Allergies   Allergen Reactions    Penicillins Rash    Vancomycin Other (comments)     kayla syndrome      Past Medical History:   Diagnosis Date    Chronic kidney disease     Diabetes ([facility]     Heart failure ([address]  Hypertension     Sickle cell trait ([facility] [address]       Past Surgical History:   Procedure Laterality Date    HX ORTHOPAEDIC      right BKA 2017      Social History     Tobacco Use    Smoking status: Former Smoker    Smokeless tobacco: Never Used   Substance Use Topics    Alcohol use: No      No family history on file. Prior to Admission medications    Medication Sig Start Date End Date Taking? Authorizing Provider   aspirin delayed-release 81 mg tablet Take 1 Tablet by mouth daily. 9/7/21   Fayrene Seton, DO   doxazosin (CARDURA) 4 mg tablet Take 2 mg by mouth daily. Provider, Historical   sodium hypochlorite 0.0125% ([employer]) Apply 1 mL to affected area two (2) times a week. Provider, Historical   omeprazole (PRILOSEC) 40 mg capsule Take 40 mg by mouth daily. Provider, Historical   sevelamer carbonate (RENVELA) 800 mg tab tab Take 800 mg by mouth three (3) times daily (with meals). Provider, Historical   sodium bicarbonate 650 mg tablet Take 650 mg by mouth three (3) times daily. Provider, Historical   amLODIPine (NORVASC) 10 mg tablet Take 1 Tab by mouth daily. 3/8/18   Real Haddad MD   carvedilol (COREG) 25 mg tablet Take 1 Tab by mouth two (2) times daily (with meals). 3/7/18   Real Haddad MD   hydrALAZINE (APRESOLINE) 25 mg tablet Take 1 Tab by mouth three (3) times daily. Patient taking differently: Take 50 mg by mouth two (2) times a day. 3/7/18   Real Haddad MD   isosorbide mononitrate ER (IMDUR) 60 mg CR tablet Take 1 Tab by mouth daily. 3/8/18   Real Haddad MD   furosemide (LASIX) 80 mg tablet Take 1 Tab by mouth two (2) times a day.  3/7/18   Real Haddad MD     Current Facility-Administered Medications   Medication Dose Route Frequency    dextrose 10% infusion 250 mL  250 mL IntraVENous ONCE    glucagon (GLUCAGEN) injection 1 mg  1 mg IntraVENous NOW         Objective:   Vital Signs:    Visit Vitals  BP (!) 195/92   Pulse 97   Temp 97.5 °F (36.4 °C)   Resp 19   SpO2 98%               Temp (24hrs), Av.5 °F (36.4 °C), Min:97.5 °F (36.4 °C), Max:97.5 °F (36.4 °C)       Intake/Output:   Last shift:      No intake/output data recorded. Last 3 shifts: No intake/output data recorded. No intake or output data in the 24 hours ending 22 1439    There were no vitals filed for this visit. No results for input(s): PHI, PHI, POC2, PCO2I, PO2, PO2I, HCO3, HCO3I, FIO2, FIO2I in the last 72 hours. Physical Exam:     General/Neurology: Alert, Awake, O x 4, Following all commands. No involuntary movements, no sensory or motor deficit; speech and memory intact, no facial droop and BL symmetrical face, NAD. On room air  Head:   Normocephalic, without obvious abnormality, atraumatic. Eye:   EOM intact. No scleral icterus, no pallor, no cyanosis. Nose:   No sinus tenderness  Throat:  Lips, mucosa, and tongue normal. No oral thrush. Neck:   Supple, symmetric. No lymphadenopathy. Trachea midline  Lung: Moderate air entry bilateral equal. No wheezing. No stridors. No prolongded expiration. No accessory muscle use. Heart:   Regular rate & rhythm. S1 S2 present. No murmur. No JVD. Abdomen:  Soft. NT. ND. +BS. No masses. Extremities:  No LT leg edema. RT BKA. No cyanosis. No clubbing. Pulses: 2+ and symmetric in DP. Capillary refill: normal bl UE  Lymphatic:  No cervical or supraclavicular palpable lymphadenopathy. Musculoskeletal: No joint swelling.  No tenderness  Skin:   Color, texture, turgor fair      Data:       Recent Results (from the past 24 hour(s))   GLUCOSE, POC    Collection Time: 22 10:20 AM   Result Value Ref Range    Glucose (POC) 46 (LL) 70 - 110 mg/dL   GLUCOSE, POC    Collection Time: 22 10:29 AM   Result Value Ref Range    Glucose (POC) 46 (LL) 70 - 110 mg/dL   GLUCOSE, POC    Collection Time: 22 10:35 AM   Result Value Ref Range    Glucose (POC) 52 (LL) 70 - 110 mg/dL   GLUCOSE, POC    Collection Time: 22 10:52 AM   Result Value Ref Range Glucose (POC) 72 70 - 110 mg/dL   CBC WITH AUTOMATED DIFF    Collection Time: 06/01/22 10:55 AM   Result Value Ref Range    WBC 6.1 4.6 - 13.2 K/uL    RBC 3.01 (L) 4.35 - 5.65 M/uL    HGB 7.9 (L) 13.0 - 16.0 g/dL    HCT 23.6 (L) 36.0 - 48.0 %    MCV 78.4 78.0 - 100.0 FL    MCH 26.2 24.0 - 34.0 PG    MCHC 33.5 31.0 - 37.0 g/dL    RDW 18.4 (H) 11.6 - 14.5 %    PLATELET 662 299 - 868 K/uL    MPV 10.1 9.2 - 11.8 FL    NRBC 0.0 0  WBC    ABSOLUTE NRBC 0.00 0.00 - 0.01 K/uL    NEUTROPHILS 83 (H) 40 - 73 %    LYMPHOCYTES 5 (L) 21 - 52 %    MONOCYTES 9 3 - 10 %    EOSINOPHILS 2 0 - 5 %    BASOPHILS 1 0 - 2 %    IMMATURE GRANULOCYTES 1 (H) 0.0 - 0.5 %    ABS. NEUTROPHILS 5.0 1.8 - 8.0 K/UL    ABS. LYMPHOCYTES 0.3 (L) 0.9 - 3.6 K/UL    ABS. MONOCYTES 0.6 0.05 - 1.2 K/UL    ABS. EOSINOPHILS 0.1 0.0 - 0.4 K/UL    ABS. BASOPHILS 0.0 0.0 - 0.1 K/UL    ABS. IMM. GRANS. 0.0 0.00 - 0.04 K/UL    DF AUTOMATED     METABOLIC PANEL, COMPREHENSIVE    Collection Time: 06/01/22 10:55 AM   Result Value Ref Range    Sodium 137 136 - 145 mmol/L    Potassium 6.2 (HH) 3.5 - 5.5 mmol/L    Chloride 104 100 - 111 mmol/L    CO2 14 (L) 21 - 32 mmol/L    Anion gap 19 (H) 3.0 - 18 mmol/L    Glucose 56 (L) 74 - 99 mg/dL     (H) 7.0 - 18 MG/DL    Creatinine 17.00 (H) 0.6 - 1.3 MG/DL    BUN/Creatinine ratio 8 (L) 12 - 20      GFR est AA 4 (L) >60 ml/min/1.73m2    GFR est non-AA 3 (L) >60 ml/min/1.73m2    Calcium 7.6 (L) 8.5 - 10.1 MG/DL    Bilirubin, total 0.4 0.2 - 1.0 MG/DL    ALT (SGPT) 61 16 - 61 U/L    AST (SGOT) 15 10 - 38 U/L    Alk.  phosphatase 86 45 - 117 U/L    Protein, total 7.5 6.4 - 8.2 g/dL    Albumin 3.0 (L) 3.4 - 5.0 g/dL    Globulin 4.5 (H) 2.0 - 4.0 g/dL    A-G Ratio 0.7 (L) 0.8 - 1.7     MAGNESIUM    Collection Time: 06/01/22 10:55 AM   Result Value Ref Range    Magnesium 3.5 (H) 1.6 - 2.6 mg/dL   EKG, 12 LEAD, INITIAL    Collection Time: 06/01/22 11:09 AM   Result Value Ref Range    Ventricular Rate 99 BPM    Atrial Rate 99 BPM    P-R Interval 164 ms    QRS Duration 88 ms    Q-T Interval 372 ms    QTC Calculation (Bezet) 477 ms    Calculated P Axis 79 degrees    Calculated R Axis 24 degrees    Calculated T Axis 68 degrees    Diagnosis       Normal sinus rhythm  Normal ECG  When compared with ECG of 16-MAY-2022 10:10,  No significant change was found     GLUCOSE, POC    Collection Time: 06/01/22 11:16 AM   Result Value Ref Range    Glucose (POC) 79 70 - 110 mg/dL   GLUCOSE, POC    Collection Time: 06/01/22 12:28 PM   Result Value Ref Range    Glucose (POC) 114 (H) 70 - 241 mg/dL   METABOLIC PANEL, BASIC    Collection Time: 06/01/22  1:36 PM   Result Value Ref Range    Sodium 137 136 - 145 mmol/L    Potassium 6.3 (HH) 3.5 - 5.5 mmol/L    Chloride 106 100 - 111 mmol/L    CO2 13 (L) 21 - 32 mmol/L    Anion gap 18 3.0 - 18 mmol/L    Glucose 81 74 - 99 mg/dL     (H) 7.0 - 18 MG/DL    Creatinine 17.60 (H) 0.6 - 1.3 MG/DL    BUN/Creatinine ratio 8 (L) 12 - 20      GFR est AA 4 (L) >60 ml/min/1.73m2    GFR est non-AA 3 (L) >60 ml/min/1.73m2    Calcium 7.7 (L) 8.5 - 10.1 MG/DL   DUPLEX UPPER EXT BYPASS GRAFT LEFT    Collection Time: 06/01/22  2:06 PM   Result Value Ref Range    Left AVF Inflow Artery PSV 61.9 cm/s    Left AVF Arterial Prox Anastomosis PSV 25.4 cm/s    Left AVF Prox Outflow PSV 0.0 cm/s    Left AVF Mid Outflow PSV 0.0 cm/s    Left AVF Dist Outflow PSV 0.0 cm/s    Left AVF Venous Dist Anastomosis PSV 0.0 cm/s    Left AVF AVG Outflow Vessel Name axillary          Chemistry Recent Labs     06/01/22  1336 06/01/22  1055   GLU 81 56*    137   K 6.3* 6.2*    104   CO2 13* 14*   * 135*   CREA 17.60* 17.00*   CA 7.7* 7.6*   MG  --  3.5*   AGAP 18 19*   BUCR 8* 8*   AP  --  86   TP  --  7.5   ALB  --  3.0*   GLOB  --  4.5*   AGRAT  --  0.7*        Lactic Acid Lactic acid   Date Value Ref Range Status   08/27/2021 1.1 0.4 - 2.0 MMOL/L Final     No results for input(s): LAC in the last 72 hours.      Liver Enzymes Protein, total   Date Value Ref Range Status   06/01/2022 7.5 6.4 - 8.2 g/dL Final     Albumin   Date Value Ref Range Status   06/01/2022 3.0 (L) 3.4 - 5.0 g/dL Final     Globulin   Date Value Ref Range Status   06/01/2022 4.5 (H) 2.0 - 4.0 g/dL Final     A-G Ratio   Date Value Ref Range Status   06/01/2022 0.7 (L) 0.8 - 1.7   Final     Alk. phosphatase   Date Value Ref Range Status   06/01/2022 86 45 - 117 U/L Final     Recent Labs     06/01/22  1055   TP 7.5   ALB 3.0*   GLOB 4.5*   AGRAT 0.7*   AP 86        CBC w/Diff Recent Labs     06/01/22  1055   WBC 6.1   RBC 3.01*   HGB 7.9*   HCT 23.6*      GRANS 83*   LYMPH 5*   EOS 2        Cardiac Enzymes No results found for: CPK, CK, CKMMB, CKMB, RCK3, CKMBT, CKNDX, CKND1, WILLIE, TROPT, TROIQ, CHANDA, TROPT, TNIPOC, BNP, BNPP     BNP No results found for: BNP, BNPP, XBNPT     Coagulation No results for input(s): PTP, INR, APTT, INREXT in the last 72 hours. Thyroid  Lab Results   Component Value Date/Time    TSH 2.38 08/29/2021 01:20 AM       No results found for: T4     Urinalysis Lab Results   Component Value Date/Time    Color YELLOW 03/02/2018 05:41 AM    Appearance CLEAR 03/02/2018 05:41 AM    Specific gravity 1.010 03/02/2018 05:41 AM    pH (UA) 5.0 03/02/2018 05:41 AM    Protein 300 (A) 03/02/2018 05:41 AM    Glucose NEGATIVE  03/02/2018 05:41 AM    Ketone NEGATIVE  03/02/2018 05:41 AM    Bilirubin NEGATIVE  03/02/2018 05:41 AM    Urobilinogen 0.2 03/02/2018 05:41 AM    Nitrites NEGATIVE  03/02/2018 05:41 AM    Leukocyte Esterase NEGATIVE  03/02/2018 05:41 AM    Epithelial cells FEW 03/02/2018 05:41 AM    Bacteria FEW (A) 03/02/2018 05:41 AM    WBC 0 to 2 03/02/2018 05:41 AM    RBC NEGATIVE  03/02/2018 05:41 AM        Micro  No results for input(s): SDES, CULT in the last 72 hours. No results for input(s): CULT in the last 72 hours. Culture data during this hospitalization.    All Micro Results     None             CT ABD PELV WO CONT    Result Date: 5/16/2022  EXAM: CT of the abdomen and pelvis CLINICAL INDICATION/HISTORY: LLQ abd pain, constipation and anal pain   > Additional: None. COMPARISON: 09/01/2021   > Reference Exam: None. TECHNIQUE: Axial CT imaging of the abdomen and pelvis was performed without intravenous contrast. Oral contrast not administered. Multiplanar reformats were generated. Dose reduction techniques used: automated exposure control, adjustment of the mAs and/or kVp according to patient size, and iterative reconstruction techniques. Digital Imaging and Communications in Medicine (DICOM) format image data are available to nonaffiliated external healthcare facilities or entities on a secure, media free, reciprocally searchable basis with patient authorization for at least a 12-month period after this study. _______________ FINDINGS: LOWER CHEST: Lung bases are clear. Heart is mildly enlarged without pericardial effusion. LIVER, BILIARY: Liver is unremarkable. No biliary dilation. Gallbladder is markedly contracted but otherwise unremarkable. PANCREAS: Unremarkable. SPLEEN: Unremarkable. ADRENALS: Unremarkable. KIDNEYS/URETERS/BLADDER: No hydronephrosis. No calculi. LYMPH NODES: No enlarged lymph nodes. GASTROINTESTINAL TRACT: No bowel dilation or wall thickening. Colonic diverticulosis. PELVIC ORGANS: Unremarkable. VASCULATURE: Moderate to marked atherosclerosis. BONES: No acute or aggressive osseous abnormalities identified. OTHER: No ascites. _______________     1. No acute intra-abdominal abnormality. Colonic diverticulosis without acute diverticulitis. * **  *        Images report reviewed by me:    CXR reviewed by me:  XR (Most Recent).  CXR  reviewed by me and compared with previous CXR Results from Hospital Encounter encounter on 01/16/22    XR CHEST PORT    Narrative  EXAM: CHEST RADIOGRAPH, SINGLE VIEW    CLINICAL INDICATION/HISTORY: dyspnea, shortness of breath    COMPARISON: 9/1/2021    TECHNIQUE: Portable frontal view of the chest was obtained.    _______________    FINDINGS:    SUPPORT DEVICES: None. HEART AND MEDIASTINUM: Cardiomediastinal silhouette appears unchanged, markedly  enlarged. Tortuous and atherosclerotic thoracic aorta. Mild central vascular  congestion. LUNGS AND PLEURAL SPACES: Patchy interstitial and alveolar opacities are seen  throughout left and right lungs. No pleural effusion or pneumothorax. BONY THORAX AND SOFT TISSUES: No acute osseous abnormality. _______________    Impression  Cardiomegaly with central vascular congestion and increased interstitial  markings, most suggestive of acute pulmonary edema. Acute atypical pulmonary  infection could also feasibly have a similar appearance. ·Please note: Voice-recognition software may have been used to generate this report, which may have resulted in some phonetic-based errors in grammar and contents. Even though attempts were made to correct all the mistakes, some may have been missed, and remained in the body of the document.       Sherlyn Razo MD  6/1/2022

## 2022-06-01 NOTE — ED NOTES
Multiple unsuccessful attempts at PIV access by this RN and charge RN, MD made aware, obtained v/o for 1 mg glucagon IM

## 2022-06-01 NOTE — ED TRIAGE NOTES
Pt brought in by EMS for Low blood sugar. Pt is a dialysis pt and his last dialysis treatment was last Wednesday.

## 2022-06-01 NOTE — PROGRESS NOTES
Received pt from IR after line placement, Pt promptly taken to MRI for brain scan, tolerated well, Pt started dialysis after returning from MRI, Pt hypertensive and blood pressure medications administered per order, pt remains alert and oriented  Bedside and Verbal shift change report given to Maikel Lyon RN (oncoming nurse) by Gemma Ojeda RN (offgoing nurse).  Report included the following information SBAR, Kardex, Intake/Output, MAR, Recent Results and Cardiac Rhythm SR.

## 2022-06-01 NOTE — CONSULTS
RENAL CONSULT  2022    Patient:  Floresita Cao  :  1970  Gender:  male  MRN #:  [id number]    Consulting Physician:  Shyam Falcon DO,  Assessment:    )AVG oclusion  HTN  Hyperkalemia  ESRD  Failure to thrive  DM  Hypoglycemia  Dialysis noncompliance    Plan:    HD today  HD orders in system  Going to MRI if no acute CVA, ok to do HD  Vascular in put for AVG   Appreciate IR inserting  Dialysis catheter      History of Present Illness:  Floresita Cao is a 54y.o. year old male with ongoing above symptoms had has AVG declotted last week, it worked on Friday but Monday the Graft was down so pt was told to go back to [facility]. Today pt states he \"fell out\"  He has not been eating all day. His sugar is low and K was high. His CT show possible new [place] CVA so he is going for MRI. Pt stable when seen in ICU. His AVG has no bruit or thrill. Past Medical History:   Diagnosis Date    Chronic kidney disease     Diabetes ([address]     [facility] failure [facility]     Hypertension     Sickle cell trait ([facility]      Past Surgical History:   Procedure Laterality Date    HX ORTHOPAEDIC      right BKA 2017    IR INSERT NON TUNL CVC OVER 5 YRS  2022     No family history on file.   Allergies   Allergen Reactions    Penicillins Rash    Vancomycin Other (comments)     kayla syndrome     Current Facility-Administered Medications   Medication Dose Route Frequency Provider Last Rate Last Admin    dextrose 10% infusion 250 mL  250 mL IntraVENous ONCE Jamin Lopez MD   Held at 22 1035    glucagon (GLUCAGEN) injection 1 mg  1 mg IntraVENous NOW Jamin Lopez MD        dextrose 10 % infusion 125-250 mL  125-250 mL IntraVENous PRN Jamin Lopez MD        heparin (porcine) injection 2,000 Units  2,000 Units IntraCATHeter- VENous DIALYSIS PRN José Alston MD        sodium chloride (NS) flush 5-40 mL  5-40 mL IntraVENous Q8H Diaz Fish MD        sodium chloride (NS) flush 5-40 mL  5-40 mL IntraVENous PRN Adin Dance, MD        acetaminophen (TYLENOL) tablet 650 mg  650 mg Oral Q6H PRN Adin Dance, MD        Or    acetaminophen (TYLENOL) suppository 650 mg  650 mg Rectal Q6H PRN Adin Dance, MD        polyethylene glycol [facility]) packet 17 g  17 g Oral DAILY PRN Adin Dance, MD        ondansetron (ZOFRAN ODT) tablet 4 mg  4 mg Oral Q8H PRN Adin Dance, MD        Or    ondansetron [facility]) injection 4 mg  4 mg IntraVENous Q6H PRN Adin Dance, MD        [START ON 6/2/2022] heparin (porcine) injection 5,000 Units  5,000 Units SubCUTAneous Q8H Adin Dance, MD  Jacklin Grams ON 6/2/2022] pantoprazole (PROTONIX) tablet 40 mg  40 mg Oral ACB Adin Dance, MD  Jacklin Grams ON 6/2/2022] aspirin delayed-release tablet 81 mg  81 mg Oral DAILY Adin Dance, MD        carvediloL (COREG) tablet 25 mg  25 mg Oral BID WITH MEALS Adin Dance, MD  Jacklin Grams ON 6/2/2022] doxazosin (CARDURA) tablet 2 mg  2 mg Oral DAILY Adin Dance, MD        furosemide (LASIX) tablet 80 mg  80 mg Oral BID Adin Dance, MD        hydrALAZINE (APRESOLINE) tablet 50 mg  50 mg Oral BID Adin Dance, MD  Jacklin Grams ON 6/2/2022] isosorbide mononitrate ER (IMDUR) tablet 60 mg  60 mg Oral DAILY Adin Dance, MD        sevelamer carbonate (RENVELA) tab 800 mg  800 mg Oral TID WITH MEALS Adin Dance, MD        amLODIPine (NORVASC) tablet 10 mg  10 mg Oral DAILY Adin Dance, MD           Review of Symptoms:  See HPI  Objective:  Visit Vitals  BP (!) 211/92   Pulse 93   Temp 97.5 °F (36.4 °C)   Resp 12   SpO2 100%         Frail, dry skin, awake and alert  No edema  Thin and muscle wasting        Laboratory Data:  Lab Results   Component Value Date     (H) 06/01/2022     (H) 06/01/2022     (H) 05/16/2022     06/01/2022     06/01/2022     05/16/2022    CO2 13 (L) 06/01/2022    CO2 14 (L) 06/01/2022    CO2 12 (L) 05/16/2022     Lab Results   Component Value Date    WBC 6.1 06/01/2022    HGB 7.9 (L) 06/01/2022    HCT 23.6 (L) 06/01/2022         Imaging have been reviewed:  )CT HEAD WO CONT    Result Date: 6/1/2022  EXAM: CT head INDICATION: Confusion. COMPARISON: 8/29/2021 TECHNIQUE: Axial CT imaging of the head was performed without intravenous contrast. Standard multiplanar coronal and sagittal reformatted images were obtained and are included in interpretation. One or more dose reduction techniques were used on this CT: automated exposure control, adjustment of the mAs and/or kVp according to patient size, and iterative reconstruction techniques. The specific techniques used on this CT exam have been documented in the patient's electronic medical record. Digital Imaging and Communications in Medicine (DICOM) format image data are available to nonaffiliated external healthcare facilities or entities on a secure, media free, reciprocally searchable basis with patient authorization for at least a 12-month period after this study. FINDINGS: BRAIN AND POSTERIOR FOSSA: Right central arden hypodensity, new from prior study. No evidence of acute large vessel transcortical infarct or acute parenchymal hemorrhage. No midline shift or hydrocephalus. EXTRA-AXIAL SPACES AND MENINGES: There are no abnormal extra-axial fluid collections. CALVARIUM: Intact. SINUSES: Clear. OTHER: None. Right central arden hypodensity, new from prior study, age indeterminate infarct. Correlate clinically. CT ABD PELV WO CONT    Result Date: 5/16/2022  EXAM: CT of the abdomen and pelvis CLINICAL INDICATION/HISTORY: LLQ abd pain, constipation and anal pain   > Additional: None. COMPARISON: 09/01/2021   > Reference Exam: None. TECHNIQUE: Axial CT imaging of the abdomen and pelvis was performed without intravenous contrast. Oral contrast not administered. Multiplanar reformats were generated. Dose reduction techniques used: automated exposure control, adjustment of the mAs and/or kVp according to patient size, and iterative reconstruction techniques.   Digital Imaging and Communications in Medicine (DICOM) format image data are available to nonaffiliated external healthcare facilities or entities on a secure, media free, reciprocally searchable basis with patient authorization for at least a 12-month period after this study. FINDINGS: LOWER CHEST: Lung bases are clear. Heart is mildly enlarged without pericardial effusion. LIVER, BILIARY: Liver is unremarkable. No biliary dilation. Gallbladder is markedly contracted but otherwise unremarkable. PANCREAS: Unremarkable. SPLEEN: Unremarkable. ADRENALS: Unremarkable. KIDNEYS/URETERS/BLADDER: No hydronephrosis. No calculi. LYMPH NODES: No enlarged lymph nodes. GASTROINTESTINAL TRACT: No bowel dilation or wall thickening. Colonic diverticulosis. PELVIC ORGANS: Unremarkable. VASCULATURE: Moderate to marked atherosclerosis. BONES: No acute or aggressive osseous abnormalities identified. OTHER: No ascites. 1.  No acute intra-abdominal abnormality. Colonic diverticulosis without acute diverticulitis. *     IR INSERT NON TUNL CVC OVER 5 YRS    Result Date: 6/1/2022  PROCEDURE: NON-TUNNELED DIALYSIS CATHETER PLACEMENT ATTENDING: Maame Day M.D. CONTRAST: None COMPLICATIONS: None MEDICATIONS: Local lidocaine. INDICATION: Renal failure requiring hemodialysis. PROCEDURE: Informed consent was obtained where the risks, benefits and alternatives to the procedure were explained. All elements of maximal sterile barrier technique followed including: cap and mask and sterile gown and sterile gloves and a large sterile sheet and hand hygiene and 2% chlorhexidine for cutaneous antisepsis (or acceptable alternative antiseptics, per current guidelines). Sterile ultrasound gel and a sterile cover for the US probe was used. After successfully identifying a patent right internal jugular vein, real-time ultrasound guidance demonstrated patent right internal jugular vein with normal waveforms.  Then, real-time ultrasound guidance was used to puncture the right internal jugular vein. Permanent recording was created for the patient's record. Using this access, a 12 Fr dialysis catheter was placed under fluoroscopic guidance with its tip at the caval atrial junction. The catheter was secured to the skin with sutures, the ports heparinized, and a sterile dressing applied to the area. The catheter may be used immediately. The patient tolerated the procedure well and was transferred from the IR suite in stable condition. Fluoroscopic dose (reference air kerma): 9 mGy     Non-tunneled dialysis catheter placement performed, as discussed above.        Total time spent 61 minutes      )Yanet Landry DO,

## 2022-06-01 NOTE — PROGRESS NOTES
TREATMENT SUMMARY   Patient in room 108 dialyzed for 3.5  hours. Tolerated tx well with without complaint or complications. Right  IJ functioning without complication.     3500 ml UF removed with a net UF removal of 3000 ml. Report given to Nakia Sanchez RN with all questions answered. TREATMENT NOTES        Received report from Live Hudson RN at 1700. TX was initiated while patient was in bed without difficulty. Aseptically, the CVC ports were accesed and flushed without problem. Treatment was started without reversing the lines. Will continue to monitor patients closely during treatment. ACUTE HEMODIALYSIS FLOW SHEET       PATIENT INFORMATION   [facility]       DR. Anna Soto    []1st Time Acute  [x]Stat[] Routine []Urgent []Chronic Unit   []Acute Room []Bedside  [x]ICU/CCU []ER   Isolation Precautions: [x]Dialysis[] Airborne []Contact []Droplet []Reverse    Special Considerations:_______  [] Blood Consent Verified  []N/A   Allergies:[] NKA                 [x] _Penicillins, Vancomycin____________ Code Status [x]Full Code [] DNR  [] Other_____   Diet: [x] Renal [] NPO [] Diabetic   [] Enteral Feeding [] Cardiac Diabetic: []Yes []No     []Signed Treatment Consent Verified   [] Time Out/ Safety Check   PRIMARY NURSE REPORT: FIRST INITIAL/ LAST NAME/TITLE  PRE DIALYSIS:   Live Hudson                                        TIME:1700   ACCESS   CATHETER ACCESS: [] N/A  [] RIGHT  [x] LEFT  [x] IJ  [] SUBCL [] FEM                    [] First use X-ray  [] Tunnel     [x] Non-Tunneled      [x] No S/S infection  [] Redness [] Drainage  [] Cultured [] Swelling [] Pain                    [] Medical Aseptic [] Prep Dressing Changed                  [] Clotted [] Patent []      Flows: [] Good [] Poor [] Reversed                 If Access Problem Dr. Rudy Dash: [] Yes [] No Date:_____  [] N/A[]   GRAFT/FISTULA ACCESS:  [] N/A  [] RIGHT  [x] LEFT  [] UE   [] LE       [] AVG  [x] AVF [] BUTTONHOLE    [] +BRUIT/THRILL [] MEDICAL ASEPTIC PREP     [] No S/S infection  [] Redness [] Drainage  [] Cultured [] Swelling [] Pain              If Access Problem Dr. Wells Batter: [x] Yes [] No    Date:_06-1-2022. Access not used_____ [] N/A   GENERAL ASSESSMENT   LUNGS:  SaO2% ____ [x] Clear [] Coarse [] Crackles [] Wheezing               [] Diminished Location: [] RLL [] LLL [] RUL [] KYLAH    COUGH:  [] Productive  [] Loose[] Dry [] N/A  RESPIRATIONS: [] Easy [] Labored    THERAPY: [] RA   [] NC _____. L/min    Mask: [] NRB [] Venti  _____O2%                  [] Ventilator [] Intubated [] Trach [] BiPap [] CPap [] HI Flow   CARDIAC: [x] Regular [] Irregular [] Pericardial Rub [] JVD               Monitored Rhythm:______ [] N/A   EDEMA: [x] None [] Generalized [] Facial [] Pedal [] UE [] LE             [] Pitting [] 1 [] 2 [] 3 [] 4    [] Right [] Left [] Bilateral   SKIN:    [x] Warm [] Hot [] Cold  [] Dry [] Pale [] Diaphoretic              [] Flushed [] Jaundiced [] Cyanotic [] Rash [] Weeping     LOC:    [x] Alert  Oriented to: [x] Person [x] Place [x] Time             [] Confused [] Lethargic [] Medicated [] Non-responsive    GI/ABDOMEN: [x] Flat [] Distended [] Soft [] Firm [] Diarrhea [x] Bowel Sounds Present [] Nausea [] Vomiting    PAIN: [x] 0 [] 1 [] 2 [] 3 [] 4 [] 5 [] 6 [] 7 [] 8 [] 9 [] 10          Scale 1-10 Action/Follow Up_____   MOBILITY: [] Amb [x] Amb/Assist [] Bed  [] Wheelchair    CURRENT LABS   HBsAg ONLY: Date Drawn:1-             [x] Negative [] Positive [] Unknown.      HBsAb: Date Drawn:  1-            [x] Susceptible <10 [] Immune ?10 [] Unknown   Date of Current Labs:  06-     EDUCATION   Person Educated: [x] Patient [] Other_________   Knowledge base: [] None [x] Minimal [] Substantial    Barriers to learning  [] None _sleepy   Preferred method of learning: [] Written [] Oral [x] Visual [] Hands on    Topic: [] Access Care [] S&S of infection [] Fluid Management   [x] K+  [x] Procedural  [] Albumin [] Medications [] Tx Options   [] Transplant [] Diet [] Other    Teaching Tools: [] Explain [] Demonstration [] Handout_____ [] Video______  CARE PLAN    [x] Renal Failure (Adult)  Interdisciplinary  · Fluid and electrolytes stabilized  ? Interventions  · Dehydration signs and symptoms (eg: Weight/lab monitoring; vomiting/diarrhea/urine; tenting; mucous membranes; dizziness/lethargy/irritability/confusion; weak pulse; tachycardia; blood pressure; I&O)  · Fluid overload signs and symptoms assessment (eg: Body weight increased; dyspnea; edema; hypertension; respiratory crackles/wheezing; JVD; lab monitoring; mental status changes; I&O)  · Monitor appropriate lab values  · COMPLIANCE WITH PRESCRIBED THERAPY  · ARTERIAL ACCESS SITE ASSESSMENT  · NUTRITION SCREENING  · Vital signs monitoring per assessed patient condition or unit standard  · Cardiac monitoring  · Hydration management  · Intake and output measurement  · Body weight monitoring  · Skin care  · DIALYSIS  · Nutrition Care Process per nutrition screen  · Oral hygiene care every 2 hours  · Pain management     · Outcome   ? [x] Progressing Towards Goal  ? [] Not Progressing Towards Goals  ? [] Goals Met/Resolved  ? [] Goals Not Met/ Resolved        · Patient/ Family Education  ? Progressing Towards Goals          RO/HEMODIAYLSIS MACHINE SAFETY CHECKS- BEFORE EACH TREATMENT          [x] [facility]: Machine Serial #1:  [id number]    Serial #1: [id number]                       [] [facility]: Machine Serial #2:  [id number]    Serial #2: [id number]        [] [facility]: Machine Serial #3:  [id number]    Serial #[id number]    Alarm Test: [x] Pass  Time_1656__  [x] RO/Machine Log Complete    [x] Extracorporeal circuit Tested for integrity           [id number]   Tubing___21I28-11    Dialysate: pH_7.2__  Temp. 36.0____Conductivity: Meter 14.0____ HD Machine_14.0__   CHLORINE TESTING- BEFORE EACH TREATMENT AND EVERY 4 HOURS   Total Chlorine: [x] Less than 0.1 ppm Time:_____2nd Check Time:______  (If greater than 0.1 ppm from Primary then every 30 minutes from Secondary)   TREATMENT INIATION-WITH DIALYSIS PRECAUTIONS   [x] All Connections Secured   [x] Saline Line Double Clamped    [x] Venous Parameters Set [x] Arterial Parameters Set    [x] Prime Given 250 ml     [x] Air Foam Detector Engaged   PRE-TREATMENT   UF Calculations: Wt to lose:_3000___ml(+) Oral:__ml(+)IV Meds/Fluids/Blood prods___ml(+) Prime/Rinse_500__ml(=)Total UF Goal__3500__mL   Scale Type:[x] Bed scale [] Sling Scale [] Wheel Chair Scale []  Not Ordered [] [] Unable to obtain pt on stretcher/ bed scale malfunctioning      [x] Time Out/Safety Check  Time:_1715   INTRADIALYTIC MONITORING  (SEE ATTACHED FLOWSHEET)     POST TREATMENT    Time Medication Dose Volume Route    Initials                                           [facility] Signatures Title Initials Time                     Dialyzer cleared: [x] Good [] Fair [] Poor     Blood Processed 75___Liters    Net UF Removed 3000 ____mL  Post Tx Access:                  AVF/AVG: Bleeding Stop       Art.___min Chaim.____min []+bruit/thrill                Catheter: Locking Solution [] Heparin 1 ml/1000 units  [] Normal Saline                                                                               Art.1.3_ ml Chaim._1.3_ml  Post Assessment:              Skin: [x]Warm []Dry []Diaphoretic []Flushed [] Pale []Cyanotic            Lungs: [x]Clear []Coarse []Crackles []Wheezing             Cardiac: []Regular []Irregular  []Monitored rhythm____ []N/A            Edema: [x]None []General []Facial []Pedal  []UE []LE []RIGHT []LEFT            Pain: [x]0 []1 []2 []3 []4 []5 []6 []7 []8 []9 []10   POST Tx Note:  Patient in room 108 dialyzed for 3.5  hours. Tolerated tx well with without complaint or complications.   Right  IJ functioning without complication.     3500 ml UF removed with a net UF removal of 3000 ml. Report given to Kristie Thomas RN with all questions answered.        Primary Nurse Report: First initial/Last name/Title    Post Dialysis:__Chirag Nunn_     Time:__2130_    Abbreviations: AVG-arterial venous graft, AVF-arterial venous fistula, IJ-Internal Jugular,  Subcl-Subclavian, Fem-Femoral, Tx-treatment, AP/HR-apical heart rate, DFR-dialysate flow rate, BFR-blood flow rate, AP-arterial pressure, -venous pressure, UF-ultrafiltrate, TMP-transmembrane pressure, Chaim-Venous, Art-Arterial, RO-Reverse Osmosis

## 2022-06-01 NOTE — ED PROVIDER NOTES
EMERGENCY DEPARTMENT HISTORY AND PHYSICAL EXAM    Date: 6/1/2022  Patient Name: Kanika Vela    History of Presenting Illness     Chief Complaint   Patient presents with    Low Blood Sugar       History Provided By: Patient and EMS     History Failiana Ruiz):   10:20 AM  Kanika Vela is a 54 y.o. male with a PMHX of ESRD (dialysis M/W/F), diabetes, CHF, HTN who presents to the emergency department (room 3) by EMS C/O confusion onset today. Associated sxs include hypoglycemia, missed dialysis. Patient is unable to confirm or deny any further symptoms or complaints due to confusion. Per EMS, they arrived he was profoundly hypoglycemic. Fire arrived on scene first.  They gave oral glucose twice without marked effect (improved only to the 40s). Patient is a dialysis patient who has not had dialysis in over a week due to a clogged port. IV was not established upon arrival to the ED. Per EMS, they were called because the patient had been found on the floor by his brother and was acting confused. Patient states that last dialysis was 25 May 2022, last time he ate anything was yesterday. Chief Complaint: Confusion  Onset: Today  Timing:  Acute  Context: Symptoms started spontaneously, symptoms have rapidly worsened since onset  Location: Generalized  Quality: Slightly confused  Severity: Severe  Modifying Factors: Nothing makes it better, or worse. Associated Symptoms: Low blood sugar, missed dialysis    PCP: Gunjan Yo MD     Past History         Past Medical History:  Past Medical History:   Diagnosis Date    Chronic kidney disease     Diabetes ([address]     [facility]     Hypertension     Sickle cell trait ([facility]        Past Surgical History:  Past Surgical History:   Procedure Laterality Date    HX ORTHOPAEDIC      right BKA 2017       Family History:  No family history on file.   Reviewed and non-contributory    Social History:  Social History     Tobacco Use    Smoking status: Former Smoker  Smokeless tobacco: Never Used   Substance Use Topics    Alcohol use: No    Drug use: No       Medications:  Current Facility-Administered Medications   Medication Dose Route Frequency Provider Last Rate Last Admin    dextrose 10% infusion 250 mL  250 mL IntraVENous ONCE Maria D Rutledge MD   Held at 06/01/22 1035    glucagon (GLUCAGEN) injection 1 mg  1 mg IntraVENous NOW Jacinto Ortiz MD        dextrose 10 % infusion 125-250 mL  125-250 mL IntraVENous PRN Jacinto Ortiz MD         Current Outpatient Medications   Medication Sig Dispense Refill    aspirin delayed-release 81 mg tablet Take 1 Tablet by mouth daily. 30 Tablet 0    doxazosin (CARDURA) 4 mg tablet Take 2 mg by mouth daily.  sodium hypochlorite 0.0125% (DAKINS SOLUTION) Apply 1 mL to affected area two (2) times a week.  omeprazole (PRILOSEC) 40 mg capsule Take 40 mg by mouth daily.  sevelamer carbonate (RENVELA) 800 mg tab tab Take 800 mg by mouth three (3) times daily (with meals).  sodium bicarbonate 650 mg tablet Take 650 mg by mouth three (3) times daily.  amLODIPine (NORVASC) 10 mg tablet Take 1 Tab by mouth daily. 30 Tab 0    carvedilol (COREG) 25 mg tablet Take 1 Tab by mouth two (2) times daily (with meals). 60 Tab 0    hydrALAZINE (APRESOLINE) 25 mg tablet Take 1 Tab by mouth three (3) times daily. (Patient taking differently: Take 50 mg by mouth two (2) times a day.) 90 Tab 0    isosorbide mononitrate ER (IMDUR) 60 mg CR tablet Take 1 Tab by mouth daily. 30 Tab 0    furosemide (LASIX) 80 mg tablet Take 1 Tab by mouth two (2) times a day. 60 Tab 0       Allergies: Allergies   Allergen Reactions    Penicillins Rash    Vancomycin Other (comments)     kayla syndrome       Review of Systems      Review of Systems   Unable to perform ROS: Mental status change   Psychiatric/Behavioral: Positive for confusion.        Physical Exam     Vitals:    06/01/22 1056 06/01/22 1114 06/01/22 1131 06/01/22 1151   BP: (!) 206/83  (!) 163/121 (!) 195/92   Pulse: 98  97    Resp: 26  19    Temp:  97.5 °F (36.4 °C)     SpO2: 94%  98%        Physical Exam  Vitals and nursing note reviewed. Constitutional:       General: He is in acute distress. Appearance: Normal appearance. He is normal weight. He is not ill-appearing, toxic-appearing or diaphoretic. HENT:      Head: Normocephalic and atraumatic. Nose: Nose normal. No rhinorrhea. Mouth/Throat:      Mouth: Mucous membranes are moist.      Pharynx: No oropharyngeal exudate or posterior oropharyngeal erythema. Eyes:      Extraocular Movements: Extraocular movements intact. Conjunctiva/sclera: Conjunctivae normal.      Pupils: Pupils are equal, round, and reactive to light. Cardiovascular:      Rate and Rhythm: Normal rate and regular rhythm. Heart sounds: No murmur heard. No friction rub. No gallop. Pulmonary:      Effort: Pulmonary effort is normal. No respiratory distress. Breath sounds: Normal breath sounds. No wheezing, rhonchi or rales. Abdominal:      General: Bowel sounds are normal.      Palpations: Abdomen is soft. Tenderness: There is no abdominal tenderness. There is no guarding or rebound. Musculoskeletal:         General: No swelling, tenderness or deformity. Normal range of motion. Cervical back: Normal range of motion and neck supple. No rigidity. Right Lower Extremity: Right leg is amputated below knee. Lymphadenopathy:      Cervical: No cervical adenopathy. Skin:     General: Skin is warm and dry. Findings: No rash. Neurological:      General: No focal deficit present. Mental Status: He is alert. He is confused.    Psychiatric:         Mood and Affect: Mood normal.         Behavior: Behavior normal.         Diagnostic Study Results     Labs -  Recent Results (from the past 12 hour(s))   GLUCOSE, POC    Collection Time: 06/01/22 10:20 AM   Result Value Ref Range    Glucose (POC) 46 (LL) 70 - 110 mg/dL   GLUCOSE, POC    Collection Time: 06/01/22 10:29 AM   Result Value Ref Range    Glucose (POC) 46 (LL) 70 - 110 mg/dL   GLUCOSE, POC    Collection Time: 06/01/22 10:35 AM   Result Value Ref Range    Glucose (POC) 52 (LL) 70 - 110 mg/dL   GLUCOSE, POC    Collection Time: 06/01/22 10:52 AM   Result Value Ref Range    Glucose (POC) 72 70 - 110 mg/dL   CBC WITH AUTOMATED DIFF    Collection Time: 06/01/22 10:55 AM   Result Value Ref Range    WBC 6.1 4.6 - 13.2 K/uL    RBC 3.01 (L) 4.35 - 5.65 M/uL    HGB 7.9 (L) 13.0 - 16.0 g/dL    HCT 23.6 (L) 36.0 - 48.0 %    MCV 78.4 78.0 - 100.0 FL    MCH 26.2 24.0 - 34.0 PG    MCHC 33.5 31.0 - 37.0 g/dL    RDW 18.4 (H) 11.6 - 14.5 %    PLATELET 553 555 - 574 K/uL    MPV 10.1 9.2 - 11.8 FL    NRBC 0.0 0  WBC    ABSOLUTE NRBC 0.00 0.00 - 0.01 K/uL    NEUTROPHILS 83 (H) 40 - 73 %    LYMPHOCYTES 5 (L) 21 - 52 %    MONOCYTES 9 3 - 10 %    EOSINOPHILS 2 0 - 5 %    BASOPHILS 1 0 - 2 %    IMMATURE GRANULOCYTES 1 (H) 0.0 - 0.5 %    ABS. NEUTROPHILS 5.0 1.8 - 8.0 K/UL    ABS. LYMPHOCYTES 0.3 (L) 0.9 - 3.6 K/UL    ABS. MONOCYTES 0.6 0.05 - 1.2 K/UL    ABS. EOSINOPHILS 0.1 0.0 - 0.4 K/UL    ABS. BASOPHILS 0.0 0.0 - 0.1 K/UL    ABS. IMM. GRANS. 0.0 0.00 - 0.04 K/UL    DF AUTOMATED     METABOLIC PANEL, COMPREHENSIVE    Collection Time: 06/01/22 10:55 AM   Result Value Ref Range    Sodium 137 136 - 145 mmol/L    Potassium 6.2 (HH) 3.5 - 5.5 mmol/L    Chloride 104 100 - 111 mmol/L    CO2 14 (L) 21 - 32 mmol/L    Anion gap 19 (H) 3.0 - 18 mmol/L    Glucose 56 (L) 74 - 99 mg/dL     (H) 7.0 - 18 MG/DL    Creatinine 17.00 (H) 0.6 - 1.3 MG/DL    BUN/Creatinine ratio 8 (L) 12 - 20      GFR est AA 4 (L) >60 ml/min/1.73m2    GFR est non-AA 3 (L) >60 ml/min/1.73m2    Calcium 7.6 (L) 8.5 - 10.1 MG/DL    Bilirubin, total 0.4 0.2 - 1.0 MG/DL    ALT (SGPT) 61 16 - 61 U/L    AST (SGOT) 15 10 - 38 U/L    Alk.  phosphatase 86 45 - 117 U/L    Protein, total 7.5 6.4 - 8.2 g/dL Albumin 3.0 (L) 3.4 - 5.0 g/dL    Globulin 4.5 (H) 2.0 - 4.0 g/dL    A-G Ratio 0.7 (L) 0.8 - 1.7     MAGNESIUM    Collection Time: 06/01/22 10:55 AM   Result Value Ref Range    Magnesium 3.5 (H) 1.6 - 2.6 mg/dL   EKG, 12 LEAD, INITIAL    Collection Time: 06/01/22 11:09 AM   Result Value Ref Range    Ventricular Rate 99 BPM    Atrial Rate 99 BPM    P-R Interval 164 ms    QRS Duration 88 ms    Q-T Interval 372 ms    QTC Calculation (Bezet) 477 ms    Calculated P Axis 79 degrees    Calculated R Axis 24 degrees    Calculated T Axis 68 degrees    Diagnosis       Normal sinus rhythm  Normal ECG  When compared with ECG of 16-MAY-2022 10:10,  No significant change was found     GLUCOSE, POC    Collection Time: 06/01/22 11:16 AM   Result Value Ref Range    Glucose (POC) 79 70 - 110 mg/dL   GLUCOSE, POC    Collection Time: 06/01/22 12:28 PM   Result Value Ref Range    Glucose (POC) 114 (H) 70 - 529 mg/dL   METABOLIC PANEL, BASIC    Collection Time: 06/01/22  1:36 PM   Result Value Ref Range    Sodium 137 136 - 145 mmol/L    Potassium 6.3 (HH) 3.5 - 5.5 mmol/L    Chloride 106 100 - 111 mmol/L    CO2 13 (L) 21 - 32 mmol/L    Anion gap 18 3.0 - 18 mmol/L    Glucose 81 74 - 99 mg/dL     (H) 7.0 - 18 MG/DL    Creatinine 17.60 (H) 0.6 - 1.3 MG/DL    BUN/Creatinine ratio 8 (L) 12 - 20      GFR est AA 4 (L) >60 ml/min/1.73m2    GFR est non-AA 3 (L) >60 ml/min/1.73m2    Calcium 7.7 (L) 8.5 - 10.1 MG/DL   DUPLEX UPPER EXT BYPASS GRAFT LEFT    Collection Time: 06/01/22  2:06 PM   Result Value Ref Range    Left AVF Inflow Artery PSV 61.9 cm/s    Left AVF Arterial Prox Anastomosis PSV 25.4 cm/s    Left AVF Prox Outflow PSV 0.0 cm/s    Left AVF Mid Outflow PSV 0.0 cm/s    Left AVF Dist Outflow PSV 0.0 cm/s    Left AVF Venous Dist Anastomosis PSV 0.0 cm/s    Left AVF AVG Outflow Vessel Name axillary        Radiologic Studies -   DUPLEX UPPER EXT BYPASS GRAFT LEFT         CT HEAD WO CONT    (Results Pending)   IR INSERT NON TUNL CVC LESS THAN 5 YR    (Results Pending)     CT Results  (Last 48 hours)    None        CXR Results  (Last 48 hours)    None          Medications given in the ED-  Medications   dextrose 10% infusion 250 mL (0 mL IntraVENous Held 6/1/22 1035)   glucagon (GLUCAGEN) injection 1 mg ( IntraVENous Canceled Entry 6/1/22 1037)   dextrose 10 % infusion 125-250 mL (has no administration in time range)   glucagon (GLUCAGEN) injection (1 mg IntraVENous Given 6/1/22 1039)   calcium gluconate 1 gram in sodium chloride (ISO-OSM) 50 mL infusion (0 g IntraVENous IV Completed 6/1/22 1400)   dextrose 10 % infusion 250 mL (250 mL IntraVENous New Bag 6/1/22 1230)   insulin regular (NOVOLIN R, HUMULIN R) injection 10 Units (10 Units IntraVENous Given 6/1/22 1229)       Procedures     Procedures    ED Course     I am the first provider for this patient. I reviewed the vital signs, available nursing notes, past medical history, past surgical history, family history and social history. Records Reviewed: Nursing Notes    Cardiac Monitor:  Rate: 98 bpm  Rhythm: sinus rhythm    Pulse Oximetry Analysis - 94% on RA    EKG interpretation: (Preliminary)  Rhythm: NSR. Rate: 99 bpm; no STEMI  EKG read by Idris Aguirre MD at 11:09 AM    10:20 AM Initial assessment performed. The patients presenting problems have been discussed, and they are in agreement with the care plan formulated and outlined with them. I have encouraged them to ask questions as they arise throughout their visit. ED Course as of 06/01/22 1443   Wed Jun 01, 2022   1021 Arrival glucose was 46. Patient does not have IV access. IM glucagon is being given. [JM]   1353 Patient states he was supposed to get dialysis yesterday. They were unable to feel a palpable thrill in his AV fistula and told him to come to the ED. He states he was planning to come in today for this.  [JM]   5427 Discussed with Dr. Frank Jacobson, he agrees with proceeding to fistulogram.  He will have the dialysis nurse attempt access as soon as they are able. If they are not able to access his fistula, he will require vascular surgery consult. [JM]   3224 Discussed with Dr. Glenn Downey who requested a recheck of the patient's potassium. She also requests a head CT. [JM]   1424 Discussed with Dr. Jimmie Gunn who will place a dialysis catheter under IR [JM]   1430 Discussed with Dr. Liliana Bartholomew for ICU admit. [JM]   1646 Discussed with Dr. Alva Brown, he discussed with Dr. Taqueria Ghosh, plan for dialysis after catheter placement. [JM]   8303 Discussed with Dr. Glenn Downey for ICU admission. [JM]      ED Course User Index  [JM] Radha Nuno MD       Medical Decision Making     Provider Notes (Medical Decision Making):   DDX: Hypoglycemia, missed dialysis, fistula thrombosis, poor compliance    Discussion:  54 y.o. male who has been having difficulties with his AV fistula over the last week. He was last dialyzed approximately 1 week ago. He attempted dialysis yesterday but they were unable to get access. Duplex of his AV fistula today demonstrated a occluded fistula. Patient was also profoundly hypoglycemic at home. He now has IV access in the ED he has been given IM glucagon and IV glucose as well as insulin and calcium gluconate for his hyperkalemia. Discussed with nephrology who will dialyze him as he is hypertensive and fluid overloaded. Discussed with interventional radiologist will place a temporary dialysis catheter. Discussed with intensivist for ICU admission. Discussed with hospitalist for ICU admission. Patient understands and agrees with need for admission. Critical Care Time:   I have spent 99 minutes of critical care time involved in lab review, consultations with specialist, family decision-making, and documentation. During this entire length of time I was immediately available to the patient. Critical Care:   The reason for providing this level of medical care for this critically ill patient was due a critical illness that impaired one or more vital organ systems such that there was a high probability of imminent or life threatening deterioration in the patients condition. This care involved high complexity decision making to assess, manipulate, and support vital system functions, to treat this degreee vital organ system failure and to prevent further life threatening deterioration of the patients condition. Farzaneh Argueta MD    Diagnosis and Disposition     Critical Care Time: 99 minutes    Core Measures:  For Hospitalized Patients:    1. Hospitalization Decision Time:  The decision to hospitalize the patient was made by Cornelia Puentes MD, MD at 12:22 PM on 6/1/2022    2. Aspirin: Aspirin was not given because the patient did not present with a stroke at the time of their Emergency Department evaluation    2:43 PM Patient is being admitted to the hospital by Dr. Rachel Pat. The results of their tests and reasons for their admission have been discussed with them and/or available family. They convey agreement and understanding for the need to be admitted and for their admission diagnosis. CONDITIONS ON ADMISSION:  Sepsis is not present at the time of admission. Deep Vein Thrombosis is not present at the time of admission. Thrombosis is present (AV Fistula) at the time of admission. Urinary Tract Infection is not present at the time of admission. Pneumonia is not present at the time of admission. MRSA is not present at the time of admission. Wound infection is not present at the time of admission. Pressure Ulcer is not present at the time of admission. CLINICAL IMPRESSION:    1. Hypoglycemia    2. Hyperkalemia    3. ESRD (end stage renal disease) on dialysis ([address]    4. Thrombosis of arteriovenous dialysis fistula, initial encounter [facility])        Dragon Disclaimer     Please note that this dictation was completed with [email or web address], the computer voice recognition software.   Quite often unanticipated grammatical, syntax, homophones, and other interpretive errors are inadvertently transcribed by the computer software. Please disregard these errors. Please excuse any errors that have escaped final proofreading.     Familia Pettit MD

## 2022-06-01 NOTE — PROGRESS NOTES
Mri brain :  1. No acute hemorrhage or acute infarction. 2. Numerous bilateral chronic right greater than left cerebellar hemisphere  infarctions unchanged from previous. 3. Chronic right paramedian upper pontine lacunar small vessel infarction, new  from previous MRI. 4. Mild atrophy and ischemic white matter changes.

## 2022-06-02 ENCOUNTER — APPOINTMENT (OUTPATIENT)
Dept: GENERAL RADIOLOGY | Age: 52
DRG: 270 | End: 2022-06-02
Attending: INTERNAL MEDICINE
Payer: MEDICARE

## 2022-06-02 ENCOUNTER — APPOINTMENT (OUTPATIENT)
Dept: VASCULAR SURGERY | Age: 52
DRG: 270 | End: 2022-06-02
Attending: INTERNAL MEDICINE
Payer: MEDICARE

## 2022-06-02 ENCOUNTER — APPOINTMENT (OUTPATIENT)
Dept: NON INVASIVE DIAGNOSTICS | Age: 52
DRG: 270 | End: 2022-06-02
Attending: INTERNAL MEDICINE
Payer: MEDICARE

## 2022-06-02 LAB
ANION GAP SERPL CALC-SCNC: 11 MMOL/L (ref 3–18)
BASOPHILS # BLD: 0 K/UL (ref 0–0.1)
BASOPHILS NFR BLD: 0 % (ref 0–2)
BUN SERPL-MCNC: 54 MG/DL (ref 7–18)
BUN/CREAT SERPL: 7 (ref 12–20)
CALCIUM SERPL-MCNC: 7.5 MG/DL (ref 8.5–10.1)
CHLORIDE SERPL-SCNC: 101 MMOL/L (ref 100–111)
CHOLEST SERPL-MCNC: 115 MG/DL
CO2 SERPL-SCNC: 23 MMOL/L (ref 21–32)
CREAT SERPL-MCNC: 8.2 MG/DL (ref 0.6–1.3)
DIFFERENTIAL METHOD BLD: ABNORMAL
ECHO AO ROOT DIAM: 3.2 CM
ECHO AO ROOT INDEX: 1.69 CM/M2
ECHO AV AREA PEAK VELOCITY: 1.6 CM2
ECHO AV AREA VTI: 1.7 CM2
ECHO AV AREA/BSA PEAK VELOCITY: 0.8 CM2/M2
ECHO AV AREA/BSA VTI: 0.9 CM2/M2
ECHO AV MEAN GRADIENT: 9 MMHG
ECHO AV MEAN VELOCITY: 1.4 M/S
ECHO AV PEAK GRADIENT: 17 MMHG
ECHO AV PEAK VELOCITY: 2 M/S
ECHO AV VELOCITY RATIO: 0.55
ECHO AV VTI: 35 CM
ECHO EST RA PRESSURE: 3 MMHG
ECHO LA DIAMETER INDEX: 2.33 CM/M2
ECHO LA DIAMETER: 4.4 CM
ECHO LA TO AORTIC ROOT RATIO: 1.38
ECHO LA VOL 2C: 64 ML (ref 18–58)
ECHO LA VOLUME INDEX A2C: 34 ML/M2 (ref 16–34)
ECHO LV E' LATERAL VELOCITY: 7 CM/S
ECHO LV E' SEPTAL VELOCITY: 5 CM/S
ECHO LV EDV A2C: 130 ML
ECHO LV EDV A4C: 124 ML
ECHO LV EDV BP: 126 ML (ref 67–155)
ECHO LV EDV INDEX A4C: 66 ML/M2
ECHO LV EDV INDEX BP: 67 ML/M2
ECHO LV EDV NDEX A2C: 69 ML/M2
ECHO LV EJECTION FRACTION A2C: 66 %
ECHO LV EJECTION FRACTION A4C: 58 %
ECHO LV EJECTION FRACTION BIPLANE: 62 % (ref 55–100)
ECHO LV ESV A2C: 44 ML
ECHO LV ESV A4C: 52 ML
ECHO LV ESV BP: 48 ML (ref 22–58)
ECHO LV ESV INDEX A2C: 23 ML/M2
ECHO LV ESV INDEX A4C: 28 ML/M2
ECHO LV ESV INDEX BP: 25 ML/M2
ECHO LV FRACTIONAL SHORTENING: 28 % (ref 28–44)
ECHO LV GLOBAL LONGITUDINAL STRAIN (GLS): -11.9 %
ECHO LV INTERNAL DIMENSION DIASTOLE INDEX: 2.43 CM/M2
ECHO LV INTERNAL DIMENSION DIASTOLIC: 4.6 CM (ref 4.2–5.9)
ECHO LV INTERNAL DIMENSION SYSTOLIC INDEX: 1.75 CM/M2
ECHO LV INTERNAL DIMENSION SYSTOLIC: 3.3 CM
ECHO LV IVSD: 1.6 CM (ref 0.6–1)
ECHO LV MASS 2D: 361.8 G (ref 88–224)
ECHO LV MASS INDEX 2D: 191.4 G/M2 (ref 49–115)
ECHO LV POSTERIOR WALL DIASTOLIC: 1.9 CM (ref 0.6–1)
ECHO LV RELATIVE WALL THICKNESS RATIO: 0.83
ECHO LVOT AREA: 3.1 CM2
ECHO LVOT AV VTI INDEX: 0.56
ECHO LVOT DIAM: 2 CM
ECHO LVOT MEAN GRADIENT: 3 MMHG
ECHO LVOT PEAK GRADIENT: 5 MMHG
ECHO LVOT PEAK VELOCITY: 1.1 M/S
ECHO LVOT STROKE VOLUME INDEX: 32.7 ML/M2
ECHO LVOT SV: 61.9 ML
ECHO LVOT VTI: 19.7 CM
ECHO MV A VELOCITY: 0.75 M/S
ECHO MV E DECELERATION TIME (DT): 238.1 MS
ECHO MV E VELOCITY: 0.71 M/S
ECHO MV E/A RATIO: 0.95
ECHO MV E/E' LATERAL: 10.14
ECHO MV E/E' RATIO (AVERAGED): 12.17
ECHO MV E/E' SEPTAL: 14.2
ECHO RV FREE WALL PEAK S': 12 CM/S
ECHO RV INTERNAL DIMENSION: 4.2 CM
ECHO RV TAPSE: 1.8 CM (ref 1.7–?)
EOSINOPHIL # BLD: 0.2 K/UL (ref 0–0.4)
EOSINOPHIL NFR BLD: 4 % (ref 0–5)
ERYTHROCYTE [DISTWIDTH] IN BLOOD BY AUTOMATED COUNT: 17.8 % (ref 11.6–14.5)
FOLATE SERPL-MCNC: 5.3 NG/ML (ref 3.1–17.5)
GLUCOSE BLD STRIP.AUTO-MCNC: 111 MG/DL (ref 70–110)
GLUCOSE BLD STRIP.AUTO-MCNC: 120 MG/DL (ref 70–110)
GLUCOSE BLD STRIP.AUTO-MCNC: 137 MG/DL (ref 70–110)
GLUCOSE SERPL-MCNC: 101 MG/DL (ref 74–99)
HCT VFR BLD AUTO: 23.2 % (ref 36–48)
HDLC SERPL-MCNC: 62 MG/DL (ref 40–60)
HDLC SERPL: 1.9 {RATIO} (ref 0–5)
HGB BLD-MCNC: 8 G/DL (ref 13–16)
IMM GRANULOCYTES # BLD AUTO: 0 K/UL (ref 0–0.04)
IMM GRANULOCYTES NFR BLD AUTO: 0 % (ref 0–0.5)
LDLC SERPL CALC-MCNC: 46 MG/DL (ref 0–100)
LEFT CCA DIST DIAS: 15.7 CM/S
LEFT CCA DIST SYS: 103.4 CM/S
LEFT CCA PROX DIAS: 21.3 CM/S
LEFT CCA PROX SYS: 147.4 CM/S
LEFT ECA DIAS: 0 CM/S
LEFT ECA SYS: 133 CM/S
LEFT ICA DIST DIAS: 32 CM/S
LEFT ICA DIST SYS: 104.4 CM/S
LEFT ICA MID DIAS: 25.4 CM/S
LEFT ICA MID SYS: 89 CM/S
LEFT ICA PROX DIAS: 16.7 CM/S
LEFT ICA PROX SYS: 78.1 CM/S
LEFT ICA/CCA SYS: 1 NO UNITS
LEFT VERTEBRAL DIAS: 16.7 CM/S
LEFT VERTEBRAL SYS: 82.4 CM/S
LIPID PROFILE,FLP: ABNORMAL
LYMPHOCYTES # BLD: 0.5 K/UL (ref 0.9–3.6)
LYMPHOCYTES NFR BLD: 10 % (ref 21–52)
MAGNESIUM SERPL-MCNC: 2.5 MG/DL (ref 1.6–2.6)
MCH RBC QN AUTO: 26.8 PG (ref 24–34)
MCHC RBC AUTO-ENTMCNC: 34.5 G/DL (ref 31–37)
MCV RBC AUTO: 77.9 FL (ref 78–100)
MONOCYTES # BLD: 0.7 K/UL (ref 0.05–1.2)
MONOCYTES NFR BLD: 15 % (ref 3–10)
NEUTS SEG # BLD: 3.4 K/UL (ref 1.8–8)
NEUTS SEG NFR BLD: 71 % (ref 40–73)
NRBC # BLD: 0 K/UL (ref 0–0.01)
NRBC BLD-RTO: 0 PER 100 WBC
PHOSPHATE SERPL-MCNC: 5.8 MG/DL (ref 2.5–4.9)
PLATELET # BLD AUTO: 217 K/UL (ref 135–420)
PMV BLD AUTO: 9.9 FL (ref 9.2–11.8)
POTASSIUM SERPL-SCNC: 4.5 MMOL/L (ref 3.5–5.5)
RBC # BLD AUTO: 2.98 M/UL (ref 4.35–5.65)
RIGHT CCA DIST DIAS: 23.3 CM/S
RIGHT CCA DIST SYS: 102.3 CM/S
RIGHT CCA PROX DIAS: 18.9 CM/S
RIGHT CCA PROX SYS: 95.7 CM/S
RIGHT ECA DIAS: 0 CM/S
RIGHT ECA SYS: 91.2 CM/S
RIGHT ICA DIST DIAS: 40.8 CM/S
RIGHT ICA DIST SYS: 111 CM/S
RIGHT ICA MID DIAS: 36.4 CM/S
RIGHT ICA MID SYS: 102.2 CM/S
RIGHT ICA PROX DIAS: 23.2 CM/S
RIGHT ICA PROX SYS: 95.6 CM/S
RIGHT ICA/CCA SYS: 1.1 NO UNITS
RIGHT VERTEBRAL DIAS: 0 CM/S
RIGHT VERTEBRAL DIAS: 25.4 CM/S
RIGHT VERTEBRAL SYS: 189.4 CM/S
RIGHT VERTEBRAL SYS: 89 CM/S
SODIUM SERPL-SCNC: 135 MMOL/L (ref 136–145)
TRIGL SERPL-MCNC: 35 MG/DL (ref ?–150)
VAS LEFT SUBCLAVIAN PROX EDV: 0 CM/S
VAS LEFT SUBCLAVIAN PROX PSV: 309.1 CM/S
VIT B12 SERPL-MCNC: 388 PG/ML (ref 211–911)
VLDLC SERPL CALC-MCNC: 7 MG/DL
WBC # BLD AUTO: 4.7 K/UL (ref 4.6–13.2)

## 2022-06-02 PROCEDURE — 74011250636 HC RX REV CODE- 250/636: Performed by: HOSPITALIST

## 2022-06-02 PROCEDURE — 80048 BASIC METABOLIC PNL TOTAL CA: CPT

## 2022-06-02 PROCEDURE — 84100 ASSAY OF PHOSPHORUS: CPT

## 2022-06-02 PROCEDURE — 65610000006 HC RM INTENSIVE CARE

## 2022-06-02 PROCEDURE — 93880 EXTRACRANIAL BILAT STUDY: CPT

## 2022-06-02 PROCEDURE — 93356 MYOCRD STRAIN IMG SPCKL TRCK: CPT

## 2022-06-02 PROCEDURE — 71045 X-RAY EXAM CHEST 1 VIEW: CPT

## 2022-06-02 PROCEDURE — 74011250637 HC RX REV CODE- 250/637: Performed by: HOSPITALIST

## 2022-06-02 PROCEDURE — 74011250637 HC RX REV CODE- 250/637: Performed by: INTERNAL MEDICINE

## 2022-06-02 PROCEDURE — 85025 COMPLETE CBC W/AUTO DIFF WBC: CPT

## 2022-06-02 PROCEDURE — 36415 COLL VENOUS BLD VENIPUNCTURE: CPT

## 2022-06-02 PROCEDURE — 82962 GLUCOSE BLOOD TEST: CPT

## 2022-06-02 PROCEDURE — 82607 VITAMIN B-12: CPT

## 2022-06-02 PROCEDURE — 83735 ASSAY OF MAGNESIUM: CPT

## 2022-06-02 PROCEDURE — 80061 LIPID PANEL: CPT

## 2022-06-02 PROCEDURE — 74011000250 HC RX REV CODE- 250: Performed by: HOSPITALIST

## 2022-06-02 RX ORDER — AMLODIPINE BESYLATE 5 MG/1
5 TABLET ORAL DAILY
Status: DISCONTINUED | OUTPATIENT
Start: 2022-06-03 | End: 2022-06-04

## 2022-06-02 RX ORDER — CARVEDILOL 12.5 MG/1
12.5 TABLET ORAL 2 TIMES DAILY WITH MEALS
Status: DISCONTINUED | OUTPATIENT
Start: 2022-06-02 | End: 2022-06-07 | Stop reason: HOSPADM

## 2022-06-02 RX ORDER — DOXAZOSIN 1 MG/1
2 TABLET ORAL 2 TIMES DAILY
Status: DISCONTINUED | OUTPATIENT
Start: 2022-06-02 | End: 2022-06-02

## 2022-06-02 RX ORDER — HYDRALAZINE HYDROCHLORIDE 50 MG/1
100 TABLET, FILM COATED ORAL 3 TIMES DAILY
Status: DISCONTINUED | OUTPATIENT
Start: 2022-06-02 | End: 2022-06-02

## 2022-06-02 RX ORDER — HYDRALAZINE HYDROCHLORIDE 50 MG/1
50 TABLET, FILM COATED ORAL 3 TIMES DAILY
Status: DISCONTINUED | OUTPATIENT
Start: 2022-06-02 | End: 2022-06-03

## 2022-06-02 RX ORDER — DOXAZOSIN 1 MG/1
2 TABLET ORAL DAILY
Status: DISCONTINUED | OUTPATIENT
Start: 2022-06-03 | End: 2022-06-03

## 2022-06-02 RX ADMIN — FUROSEMIDE 80 MG: 40 TABLET ORAL at 09:06

## 2022-06-02 RX ADMIN — SEVELAMER CARBONATE 800 MG: 800 TABLET, FILM COATED ORAL at 09:06

## 2022-06-02 RX ADMIN — AMLODIPINE BESYLATE 10 MG: 5 TABLET ORAL at 09:06

## 2022-06-02 RX ADMIN — SODIUM CHLORIDE, PRESERVATIVE FREE 10 ML: 5 INJECTION INTRAVENOUS at 14:12

## 2022-06-02 RX ADMIN — PANTOPRAZOLE SODIUM 40 MG: 40 TABLET, DELAYED RELEASE ORAL at 09:06

## 2022-06-02 RX ADMIN — HYDRALAZINE HYDROCHLORIDE 50 MG: 50 TABLET, FILM COATED ORAL at 09:06

## 2022-06-02 RX ADMIN — SEVELAMER CARBONATE 800 MG: 800 TABLET, FILM COATED ORAL at 17:16

## 2022-06-02 RX ADMIN — CARVEDILOL 25 MG: 12.5 TABLET, FILM COATED ORAL at 09:06

## 2022-06-02 RX ADMIN — HEPARIN SODIUM 5000 UNITS: 5000 INJECTION INTRAVENOUS; SUBCUTANEOUS at 17:17

## 2022-06-02 RX ADMIN — SODIUM CHLORIDE, PRESERVATIVE FREE 10 ML: 5 INJECTION INTRAVENOUS at 05:09

## 2022-06-02 RX ADMIN — ASPIRIN 81 MG: 81 TABLET, COATED ORAL at 09:06

## 2022-06-02 RX ADMIN — HEPARIN SODIUM 5000 UNITS: 5000 INJECTION INTRAVENOUS; SUBCUTANEOUS at 09:06

## 2022-06-02 RX ADMIN — DOXAZOSIN 2 MG: 1 TABLET ORAL at 09:06

## 2022-06-02 RX ADMIN — SEVELAMER CARBONATE 800 MG: 800 TABLET, FILM COATED ORAL at 12:44

## 2022-06-02 RX ADMIN — FUROSEMIDE 80 MG: 40 TABLET ORAL at 20:18

## 2022-06-02 RX ADMIN — ISOSORBIDE MONONITRATE 60 MG: 60 TABLET, EXTENDED RELEASE ORAL at 09:06

## 2022-06-02 RX ADMIN — HEPARIN SODIUM 5000 UNITS: 5000 INJECTION INTRAVENOUS; SUBCUTANEOUS at 00:46

## 2022-06-02 RX ADMIN — SODIUM CHLORIDE, PRESERVATIVE FREE 10 ML: 5 INJECTION INTRAVENOUS at 22:00

## 2022-06-02 NOTE — PROGRESS NOTES
Physician Progress Note      PATIENT:               Michele Simon  CSN #:                  [id number]  :                       1970  ADMIT DATE:       2022 10:18 AM  DISCH DATE:  RESPONDING  PROVIDER #:        Shana CRENSHAW MD          QUERY TEXT:    Patient admitted with hyperkalemia /AV fistula occlusion  . Noted documentation of \"Acute encephalopathy, Likely due to hypoglycemia \" By Hospitalist H&P progress note  and Toxic metabolic encephalopathy by Pulmonary in progress note . Hyperkalemia 6. 1. If possible, please document in progress notes and discharge summary type of Encephalopathy:          The medical record reflects the following:  Risk Factors: Hyperkalemia, Missed dialysis/ AV fistula occluded  Clinical Indicators: Hyperkalemia 6.1,  PER ED/  presents to the emergency department (room 3) by EMS C/O confusion onset today. Associated sxs include hypoglycemia, missed dialysis. They gave oral glucose twice without marked effect (improved only to the 40s]  Treatment: ED he has been given IM glucagon and IV glucose as well as insulin and calcium gluconate for his hyperkalemia.   Nontunneled dialysis catheter placement  Dialysis    Thank You  Noy Nixon RN CDI CRCR  Options provided:  -- Toxic encephalopathy  -- Metabolic encephalopathy d/t uncontrolled diabetes with hypoglycemia  -- Metabolic encephalopathy d/t hypoglycemia and hyperkalemia  -- Other - I will add my own diagnosis  -- Disagree - Not applicable / Not valid  -- Disagree - Clinically unable to determine / Unknown  -- Refer to Clinical Documentation Reviewer    PROVIDER RESPONSE TEXT:    Metabolic encephalopathy d/t uncontrolled diabetes with hypoglycemia    Query created by: Alicia Hong on 2022 11:37 AM      Electronically signed by:  Shana CRENSHAW MD 2022 3:27 PM

## 2022-06-02 NOTE — PROGRESS NOTES
@2000 pt taken over awake alert oriented x4 , denies pain at present. Hemodialysis in progress dialysis nurse with patient. Dressing to dialysis cath in rt IJ intact. Pt voids via urinal minimal amounts. Assessment done and preet in appropriate flow sheets . Nursing management continues. @0000 pt reassessed no new changes continues to be monitored. @0400 pt had multiple loose stools . No new changes upon reassessment care continues. @5126 Bedside and Verbal shift change report given to Meri Lundborg (oncoming nurse) by Tara Pettit (offgoing nurse). Report included the following information SBAR, Kardex, Intake/Output, MAR, Recent Results and Med Rec Status.

## 2022-06-02 NOTE — PROGRESS NOTES
Patient:  Kody Pearce  :  1970  Gender:  male  MRN #:  [id number]    Assessment:   Kody Pearce is a 54 y.o. male with h/o ESRD on HD , non compliant with dialysis /skip multiple dialysis session every month despite knowing the risk, was brought due to change in mental status   Admitted for hyperkalemia , hypoglycemia and stroke work up     # ESRD  # Uncontrolled Hypertension- Improving    # Hyperphosphatemia   #Hypoglycemia   # hyperkalemia - Improved   # Secondary Hyperparathyroidism     Plan:    Patient examined and labs reviewed this morning. He does not have clinical and metabolic indication of dialysis   - Thrombosed left UE AVG, vascular surgery consult   - next dialysis tomorrow   - Dose all meds for ESRD    BMD- has secondary Hyperparathyroidism  Will resume calcitriol 1 mcg MWF  sevelamer for phos binders       Uncontrolled Hypertension ; He presented with  BP of 211/92 mmHg  Recently anti hypertensive was changed in [facility]  , was discharged on coreg 25 mg BID and hydralazine 50 mg TID   - Amlodipine and cardura - BP is improving   No stoke per neurology   Increase Hydralazine to 100 mg TID in next 24 hours for target BP   Salt restriction to less than 2 gram     Subjective/Interval Events    Says he feels good   mental status is appropriate   Denied chest pain and SOB   No other symptoms       Blood pressure (!) 145/116, pulse 87, temperature 98.5 °F (36.9 °C), resp. rate 18, weight 77.8 kg (171 lb 8.3 oz), SpO2 92 %.     Exam:    Pt awake and alert  HEENT: No neck swelling  Lung: clear to auscultation  Heart: s1s2 heard   Abdomen: soft, non tender  Ext: no edema in LLE  RT BKA status    and skin rash   CNS- Oriented to time , place and person  LUE- AVG does not have thrill and bruit      [unfilled]   Recent Labs     22  0513   WBC 4.7   HCT 23.2*   MCV 77.9*     Recent Labs     22  0151   *   CO2 23   BUN 54*     Recent Labs     22  1055   AGRAT 0.7* Medical Decision Making :    I have reviewed patient's record for pertinent labs, radiology and other test . I have reviewed old records. I have ordered labs, medications and radiology as necessary and indicated per patient's condition . Total time spent is approximately 35 minutes. Greater than 50 % of that time was spent in counseling and or care coordination.      Bri Chairez MD  Nephrology -[facility], [facility].

## 2022-06-02 NOTE — PROGRESS NOTES
[facility]  Pulmonary, Critical Care, and Sleep Medicine    Name: Jj Cash MRN: [id number]   : 1970 Hospital: [facility] [place]    Date: 2022  Room: [address] [place] [address] Note                                              Consult requesting physician: Dr. Nayeli Marquis  Reason for Consult: assisting with ICU care for metabolic encephalopathy, hypoglycemia    IMPRESSION:   · Encephalopathy, 2' to below - G93.40  · Hypoglycemia - E16.2  · RT central pontine density - no new CVA on MRI brain - G93.9  · Pulmonary edema - J81.1  · Hyperkalemia, improved - E82.5  · Elevated HTN - I10  Patient Active Problem List   Diagnosis Code    Iron deficiency anemia D50.9    Other restrictive cardiomyopathy ([address] I42.5    ESRD (end stage renal disease) on dialysis ([address] N18.6, Z99.2    HTN (hypertension) I10    Hx of BKA, right ([address] Z89.511    Hypoglycemia E16.2    Hyperkalemia E87.5    Uremia N19    Non-compliance with renal dialysis ([address] Z91.15    Toxic metabolic encephalopathy C28.4    Hypertension I10    AV fistula occlusion ([address] T82.898A     · Code status: Full Code       RECOMMENDATIONS:     Respiratory: stable respiratory status. On room air. No respiratory distress  Chest x-ray 22 and 22 showed pulmonary edema  Reported dyspnea off and on likely related to chronic pulmonary edema in the view of inability to get HD with access malfunction  Keep SPO2 >=92%. HOB 30 degree elevation all the time. Aggressive pulmonary toileting. Aspiration precautions. Incentive spirometry when patient is agreeable. CVS: Elevated blood pressure likely related to inability to get dialyzed, no CVA per MRI  Denies any symptoms associated with elevated blood pressure  Per RN, patient shaking his hand when ever BP cuff starts inflating limiting accurate measurement by automated system in ICU. When take by RN - -170 mm Hg.  Staff aware of goal -160 mm Hg in 1st 24 hrs of presentation  BP medications parameters adjusted and staff RN to space out medications administration to avoid iatrogenic drop in BP  ECHO ordered    ID: no clinical evidence for sepsis  No fever or leukocytosis  Monitor off of antibiotic    Hematology/Oncology: Chronic anemia; stable Hgb and platelet-monitor daily CBC  No clinical evidence for bleeding anywhere    Renal: Patient may have uremia contributing to anorexia and related hypoglycemia  IR guided temporary dialysis catheter placement followed by hemodialysis 6/1/22  Nephrology following  Further HD per nephrology  Hyperkalemia resolved with hemodialysis 6/1/22  AV graft occlusion management, vascular surgery consult per nephrology    GI/: Anorexia and chronic diarrhea PTA  Improved appetite this AM per patient - requesting breakfast tray  No evidence of acute abdomen on exam  PPI for GI prophylaxis    Endocrine: Monitor serial FSBS. No further hypoglycemia episode in ICU    Neurology: Abnormal CT head on admission with right central pontine hypodensity  MRI brain 6/1/2022 no new stroke  US carotid and echo ordered  Neurology evaluating patient    Pain/Sedation: Denies any chronic pain condition. Avoid opiate, sedative, hypnotic if possible    Skin/Wound: As per ICU nursing care    Electrolytes: Replace electrolytes per HD protocol as per nephrology    Nutrition: Diet as tolerated    Prophylaxis: DVT Prophylaxis: SCD/heparin. GI Prophylaxis: Protonix. Restraints: none    Lines/Tubes: PIV  Temporary HD access 6/1/2022 (site examined, no erythema, induration, discharge or sign of infection. Dressing intact. Medically necessary, will remove it when not needed. Central line bundle followed). PT/OT eval and treat, OOB when more stable    Advance Directive/Palliative Care: Full code. Consult as per clinical course.   Prognosis guarded  Will defer respective systems problem management to primary and other respective consultant and follow patient in ICU with primary and other medical team.  Further recommendations will be based on the patient's response to recommended treatment and results of the investigation ordered. Quality Care: PPI, DVT prophylaxis, HOB elevated, Infection control all reviewed and addressed. Care of plan d/w RN, hospitalist team, neurology  D/w patient (answered all questions to satisfaction). Transfer to tele - oked per hospitalist. Will sign off once out of ICU    High complexity decision making was performed during the evaluation of this patient at high risk for decompensation with multiple organ involvement. Total critical care time spent rendering care exclusive of procedures/family discussion/coordination of care: 36 minutes. Subjective/History of Present Illness:   Patient is a 54 y.o. male with PMHx significant for ESRD on HD [M-W-F], DM, CHF, hypertension, sickle cell trait, anemia, PVD status post right BKA was brought by EMS to [facility] ER for complaint of generalized weakness, confusion, hypoglycemia. On arrival to ER, FSBS was 46 and responded to medical intervention with improvement in patient's mental status. He reports generalized weakness started about 2 days ago PTA associated with somnolence. Today he woke up feeling more generalized weak. His friend over the phone reported him speaking slurred and alerted his brother. While trying to get out of home he could not remain standing and fell on floor before he could come to [facility] for his appointment regarding AV fistula malfunction. When his brother arrived at his place found him on the floor and called EMS. He has chronic diarrhea that has worsened over the last 1 month and reports weight loss. Denies HA, neck stiffness, photophobia, sore throat, sinus pain or discharge, cough, phlegm, dysuria, nausea, vomiting, rash, adenopathy, leg swelling or calf tenderness, urethral discharge.   Has off-and-on dyspnea on exertion while denies cough, chest pain, wheezing or hemoptysis, palpitations, syncope, orthopnea, worsening of chronic right leg edema or paroxysmal nocturnal dyspnea. In ER, found to have elevated K+, S. Cr while low Hgb, blood glucose. Patient reports COVID vaccination with booster recently. Nephrology has been consulted to arrange HD. CT head showed abnormal density in the right central pontine area and nephrology has been consulted. Patient seen at bedside in ER room #3      06/02/22:   Patient seen at bedside in ICU room #108  Awake alert oriented x4, protecting airway and on room air  No respiratory distress during prolonged conversation or movement in the bed  The patient denies cough, chest pain, dyspnea, wheezing or hemoptysis  Difficult to monitor blood pressure secondary to patient's frequent movement during measurement  Received hemodialysis yesterday and tolerated well  Patient denies any palpitations, syncope, orthopnea, edema or paroxysmal nocturnal dyspnea  The patient denies any symptoms of neurological impairment or TIA's; no amaurosis, diplopia, dysphasia, or unilateral disturbance of motor or sensory function  No fever. Tolerating p.o. diet  The patient denies abdominal or flank pain, nausea or vomiting, dysphagia  Reports feeling appetite returning  No episode of hypoglycemia  [facility] was not contacted by staff on anything about patient overnight. I/O last 24 hrs: No intake or output data in the 24 hours ending 06/02/22 0901    History taken from patient, EMR    Review of Systems:  A comprehensive review of systems was negative except for that written in the HPI.        Allergies   Allergen Reactions    Penicillins Rash    Vancomycin Other (comments)     kayla syndrome      Past Medical History:   Diagnosis Date    Chronic kidney disease     Diabetes ([address]     Heart failure ([address]     Hypertension     Sickle cell trait ([address]       Past Surgical History:   Procedure Laterality Date    HX ORTHOPAEDIC      right BKA 2017  IR INSERT NON TUNL CVC OVER 5 YRS  6/1/2022      Social History     Tobacco Use    Smoking status: Former Smoker    Smokeless tobacco: Never Used   Substance Use Topics    Alcohol use: No      No family history on file. Prior to Admission medications    Medication Sig Start Date End Date Taking? Authorizing Provider   aspirin delayed-release 81 mg tablet Take 1 Tablet by mouth daily. 9/7/21   Milton Chang,    doxazosin (CARDURA) 4 mg tablet Take 2 mg by mouth daily. Provider, Historical   sodium hypochlorite 0.0125% ([employer] SOLUTION) Apply 1 mL to affected area two (2) times a week. Provider, Historical   omeprazole (PRILOSEC) 40 mg capsule Take 40 mg by mouth daily. Provider, Historical   sevelamer carbonate (RENVELA) 800 mg tab tab Take 800 mg by mouth three (3) times daily (with meals). Provider, Historical   sodium bicarbonate 650 mg tablet Take 650 mg by mouth three (3) times daily. Provider, Historical   amLODIPine (NORVASC) 10 mg tablet Take 1 Tab by mouth daily. 3/8/18   Marcelo Velazquez MD   carvedilol (COREG) 25 mg tablet Take 1 Tab by mouth two (2) times daily (with meals). 3/7/18   Marcelo Velazquez MD   hydrALAZINE (APRESOLINE) 25 mg tablet Take 1 Tab by mouth three (3) times daily. Patient taking differently: Take 50 mg by mouth two (2) times a day. 3/7/18   Marcelo Velazquez MD   isosorbide mononitrate ER (IMDUR) 60 mg CR tablet Take 1 Tab by mouth daily. 3/8/18   Marcelo Velazquez MD   furosemide (LASIX) 80 mg tablet Take 1 Tab by mouth two (2) times a day.  3/7/18   Marcelo Velazquez MD     Current Facility-Administered Medications   Medication Dose Route Frequency    sodium chloride (NS) flush 5-40 mL  5-40 mL IntraVENous Q8H    heparin (porcine) injection 5,000 Units  5,000 Units SubCUTAneous Q8H    pantoprazole (PROTONIX) tablet 40 mg  40 mg Oral ACB    aspirin delayed-release tablet 81 mg  81 mg Oral DAILY    carvediloL (COREG) tablet 25 mg  25 mg Oral BID WITH MEALS    doxazosin (CARDURA) tablet 2 mg  2 mg Oral DAILY    isosorbide mononitrate ER (IMDUR) tablet 60 mg  60 mg Oral DAILY    sevelamer carbonate (RENVELA) tab 800 mg  800 mg Oral TID WITH MEALS    amLODIPine (NORVASC) tablet 10 mg  10 mg Oral DAILY    hydrALAZINE (APRESOLINE) tablet 50 mg  50 mg Oral BID    furosemide (LASIX) tablet 80 mg  80 mg Oral BID         Objective:   Vital Signs:    Visit Vitals  BP (!) 145/116   Pulse 87   Temp 98.5 °F (36.9 °C)   Resp 18   Wt 77.8 kg (171 lb 8.3 oz)   SpO2 92%   BMI 26.86 kg/m²       O2 Device: None (Room air)       Temp (24hrs), Av °F (36.7 °C), Min:97.5 °F (36.4 °C), Max:98.5 °F (36.9 °C)       Intake/Output:   Last shift:      No intake/output data recorded. Last 3 shifts: No intake/output data recorded. No intake or output data in the 24 hours ending 22 0901    Last 3 Recorded Weights in this Encounter    22 1700 22   Weight: 80.9 kg (178 lb 5.6 oz) 77.8 kg (171 lb 8.3 oz)         No results for input(s): PHI, PHI, POC2, PCO2I, PO2, PO2I, HCO3, HCO3I, FIO2, FIO2I in the last 72 hours.     Physical Exam:   General: AAO x 4, in no respiratory distress, appears chronically ill looking, on room air  HEENT: PERRL, throat normal without erythema or exudate  Neck: No abnormally enlarged lymph nodes or thyroid, supple; right IJ nontunneled dialysis catheter in place  Chest: normal  Lungs: moderate air entry, few scattered rales bilaterally and no wheezes, normal percussion anterior chest wall bilaterally, no tenderness/ rash  Heart: Regular rate and rhythm, S1S2 present, or without murmur or extra heart sounds  Abdomen: Nondistended, bowel sounds normoactive, tympanic, abdomen is soft without significant tenderness, masses, organomegaly or guarding, rigidity, rebound  Extremity: Trace left leg edema, no cyanosis; peripheral pulses 2+ and symmetric upper extremity, capillary refill normal upper extremities; right BKA  Neuro: alert, oriented x 3, no defects noted in general exam.  Skin: Skin color, texture, turgor unchanged      Data:       Recent Results (from the past 24 hour(s))   GLUCOSE, POC    Collection Time: 06/01/22 10:20 AM   Result Value Ref Range    Glucose (POC) 46 (LL) 70 - 110 mg/dL   GLUCOSE, POC    Collection Time: 06/01/22 10:29 AM   Result Value Ref Range    Glucose (POC) 46 (LL) 70 - 110 mg/dL   GLUCOSE, POC    Collection Time: 06/01/22 10:35 AM   Result Value Ref Range    Glucose (POC) 52 (LL) 70 - 110 mg/dL   GLUCOSE, POC    Collection Time: 06/01/22 10:52 AM   Result Value Ref Range    Glucose (POC) 72 70 - 110 mg/dL   CBC WITH AUTOMATED DIFF    Collection Time: 06/01/22 10:55 AM   Result Value Ref Range    WBC 6.1 4.6 - 13.2 K/uL    RBC 3.01 (L) 4.35 - 5.65 M/uL    HGB 7.9 (L) 13.0 - 16.0 g/dL    HCT 23.6 (L) 36.0 - 48.0 %    MCV 78.4 78.0 - 100.0 FL    MCH 26.2 24.0 - 34.0 PG    MCHC 33.5 31.0 - 37.0 g/dL    RDW 18.4 (H) 11.6 - 14.5 %    PLATELET 656 286 - 582 K/uL    MPV 10.1 9.2 - 11.8 FL    NRBC 0.0 0  WBC    ABSOLUTE NRBC 0.00 0.00 - 0.01 K/uL    NEUTROPHILS 83 (H) 40 - 73 %    LYMPHOCYTES 5 (L) 21 - 52 %    MONOCYTES 9 3 - 10 %    EOSINOPHILS 2 0 - 5 %    BASOPHILS 1 0 - 2 %    IMMATURE GRANULOCYTES 1 (H) 0.0 - 0.5 %    ABS. NEUTROPHILS 5.0 1.8 - 8.0 K/UL    ABS. LYMPHOCYTES 0.3 (L) 0.9 - 3.6 K/UL    ABS. MONOCYTES 0.6 0.05 - 1.2 K/UL    ABS. EOSINOPHILS 0.1 0.0 - 0.4 K/UL    ABS. BASOPHILS 0.0 0.0 - 0.1 K/UL    ABS. IMM.  GRANS. 0.0 0.00 - 0.04 K/UL    DF AUTOMATED     METABOLIC PANEL, COMPREHENSIVE    Collection Time: 06/01/22 10:55 AM   Result Value Ref Range    Sodium 137 136 - 145 mmol/L    Potassium 6.2 (HH) 3.5 - 5.5 mmol/L    Chloride 104 100 - 111 mmol/L    CO2 14 (L) 21 - 32 mmol/L    Anion gap 19 (H) 3.0 - 18 mmol/L    Glucose 56 (L) 74 - 99 mg/dL     (H) 7.0 - 18 MG/DL    Creatinine 17.00 (H) 0.6 - 1.3 MG/DL    BUN/Creatinine ratio 8 (L) 12 - 20      GFR est AA 4 (L) >60 ml/min/1.73m2    GFR est non-AA 3 (L) >60 ml/min/1.73m2 Calcium 7.6 (L) 8.5 - 10.1 MG/DL    Bilirubin, total 0.4 0.2 - 1.0 MG/DL    ALT (SGPT) 61 16 - 61 U/L    AST (SGOT) 15 10 - 38 U/L    Alk.  phosphatase 86 45 - 117 U/L    Protein, total 7.5 6.4 - 8.2 g/dL    Albumin 3.0 (L) 3.4 - 5.0 g/dL    Globulin 4.5 (H) 2.0 - 4.0 g/dL    A-G Ratio 0.7 (L) 0.8 - 1.7     MAGNESIUM    Collection Time: 06/01/22 10:55 AM   Result Value Ref Range    Magnesium 3.5 (H) 1.6 - 2.6 mg/dL   HEMOGLOBIN A1C WITH EAG    Collection Time: 06/01/22 10:55 AM   Result Value Ref Range    Hemoglobin A1c <3.8 (L) 4.2 - 5.6 %    Est. average glucose Cannot be calculated mg/dL   EKG, 12 LEAD, INITIAL    Collection Time: 06/01/22 11:09 AM   Result Value Ref Range    Ventricular Rate 99 BPM    Atrial Rate 99 BPM    P-R Interval 164 ms    QRS Duration 88 ms    Q-T Interval 372 ms    QTC Calculation (Bezet) 477 ms    Calculated P Axis 79 degrees    Calculated R Axis 24 degrees    Calculated T Axis 68 degrees    Diagnosis       Poor data quality, interpretation may be adversely affected  Normal sinus rhythm  Borderline ECG  When compared with ECG of 16-MAY-2022 10:10,  No significant change was found  Confirmed by Dereck Posada MD. ([id number]) on 6/1/2022 11:23:48 PM     GLUCOSE, POC    Collection Time: 06/01/22 11:16 AM   Result Value Ref Range    Glucose (POC) 79 70 - 110 mg/dL   GLUCOSE, POC    Collection Time: 06/01/22 12:28 PM   Result Value Ref Range    Glucose (POC) 114 (H) 70 - 155 mg/dL   METABOLIC PANEL, BASIC    Collection Time: 06/01/22  1:36 PM   Result Value Ref Range    Sodium 137 136 - 145 mmol/L    Potassium 6.3 (HH) 3.5 - 5.5 mmol/L    Chloride 106 100 - 111 mmol/L    CO2 13 (L) 21 - 32 mmol/L    Anion gap 18 3.0 - 18 mmol/L    Glucose 81 74 - 99 mg/dL     (H) 7.0 - 18 MG/DL    Creatinine 17.60 (H) 0.6 - 1.3 MG/DL    BUN/Creatinine ratio 8 (L) 12 - 20      GFR est AA 4 (L) >60 ml/min/1.73m2    GFR est non-AA 3 (L) >60 ml/min/1.73m2    Calcium 7.7 (L) 8.5 - 10.1 MG/DL   DUPLEX UPPER EXT BYPASS GRAFT LEFT    Collection Time: 06/01/22  2:06 PM   Result Value Ref Range    Left AVF Inflow Artery PSV 61.9 cm/s    Left AVF Arterial Prox Anastomosis PSV 25.4 cm/s    Left AVF Prox Outflow PSV 0.0 cm/s    Left AVF Mid Outflow PSV 0.0 cm/s    Left AVF Dist Outflow PSV 0.0 cm/s    Left AVF Venous Dist Anastomosis PSV 0.0 cm/s    Left AVF AVG Outflow Vessel Name axillary    GLUCOSE, POC    Collection Time: 06/01/22  4:57 PM   Result Value Ref Range    Glucose (POC) 49 (LL) 70 - 110 mg/dL   GLUCOSE, POC    Collection Time: 06/01/22  4:59 PM   Result Value Ref Range    Glucose (POC) 45 (LL) 70 - 110 mg/dL   GLUCOSE, POC    Collection Time: 06/01/22  5:58 PM   Result Value Ref Range    Glucose (POC) 101 70 - 110 mg/dL   GLUCOSE, POC    Collection Time: 06/01/22  9:24 PM   Result Value Ref Range    Glucose (POC) 110 70 - 740 mg/dL   METABOLIC PANEL, BASIC    Collection Time: 06/02/22  1:51 AM   Result Value Ref Range    Sodium 135 (L) 136 - 145 mmol/L    Potassium 4.5 3.5 - 5.5 mmol/L    Chloride 101 100 - 111 mmol/L    CO2 23 21 - 32 mmol/L    Anion gap 11 3.0 - 18 mmol/L    Glucose 101 (H) 74 - 99 mg/dL    BUN 54 (H) 7.0 - 18 MG/DL    Creatinine 8.20 (H) 0.6 - 1.3 MG/DL    BUN/Creatinine ratio 7 (L) 12 - 20      GFR est AA 8 (L) >60 ml/min/1.73m2    GFR est non-AA 7 (L) >60 ml/min/1.73m2    Calcium 7.5 (L) 8.5 - 10.1 MG/DL   MAGNESIUM    Collection Time: 06/02/22  1:51 AM   Result Value Ref Range    Magnesium 2.5 1.6 - 2.6 mg/dL   PHOSPHORUS    Collection Time: 06/02/22  1:51 AM   Result Value Ref Range    Phosphorus 5.8 (H) 2.5 - 4.9 MG/DL   CBC WITH AUTOMATED DIFF    Collection Time: 06/02/22  5:13 AM   Result Value Ref Range    WBC 4.7 4.6 - 13.2 K/uL    RBC 2.98 (L) 4.35 - 5.65 M/uL    HGB 8.0 (L) 13.0 - 16.0 g/dL    HCT 23.2 (L) 36.0 - 48.0 %    MCV 77.9 (L) 78.0 - 100.0 FL    MCH 26.8 24.0 - 34.0 PG    MCHC 34.5 31.0 - 37.0 g/dL    RDW 17.8 (H) 11.6 - 14.5 %    PLATELET 246 182 - 134 K/uL    MPV 9.9 9.2 - 11.8 FL    NRBC 0.0 0  WBC    ABSOLUTE NRBC 0.00 0.00 - 0.01 K/uL    NEUTROPHILS 71 40 - 73 %    LYMPHOCYTES 10 (L) 21 - 52 %    MONOCYTES 15 (H) 3 - 10 %    EOSINOPHILS 4 0 - 5 %    BASOPHILS 0 0 - 2 %    IMMATURE GRANULOCYTES 0 0.0 - 0.5 %    ABS. NEUTROPHILS 3.4 1.8 - 8.0 K/UL    ABS. LYMPHOCYTES 0.5 (L) 0.9 - 3.6 K/UL    ABS. MONOCYTES 0.7 0.05 - 1.2 K/UL    ABS. EOSINOPHILS 0.2 0.0 - 0.4 K/UL    ABS. BASOPHILS 0.0 0.0 - 0.1 K/UL    ABS. IMM. GRANS. 0.0 0.00 - 0.04 K/UL    DF AUTOMATED           Chemistry Recent Labs     06/02/22  0151 06/01/22  1336 06/01/22  1055   * 81 56*   * 137 137   K 4.5 6.3* 6.2*    106 104   CO2 23 13* 14*   BUN 54* 141* 135*   CREA 8.20* 17.60* 17.00*   CA 7.5* 7.7* 7.6*   MG 2.5  --  3.5*   PHOS 5.8*  --   --    AGAP 11 18 19*   BUCR 7* 8* 8*   AP  --   --  86   TP  --   --  7.5   ALB  --   --  3.0*   GLOB  --   --  4.5*   AGRAT  --   --  0.7*        Lactic Acid Lactic acid   Date Value Ref Range Status   08/27/2021 1.1 0.4 - 2.0 MMOL/L Final     No results for input(s): LAC in the last 72 hours. Liver Enzymes Protein, total   Date Value Ref Range Status   06/01/2022 7.5 6.4 - 8.2 g/dL Final     Albumin   Date Value Ref Range Status   06/01/2022 3.0 (L) 3.4 - 5.0 g/dL Final     Globulin   Date Value Ref Range Status   06/01/2022 4.5 (H) 2.0 - 4.0 g/dL Final     A-G Ratio   Date Value Ref Range Status   06/01/2022 0.7 (L) 0.8 - 1.7   Final     Alk.  phosphatase   Date Value Ref Range Status   06/01/2022 86 45 - 117 U/L Final     Recent Labs     06/01/22  1055   TP 7.5   ALB 3.0*   GLOB 4.5*   AGRAT 0.7*   AP 86        CBC w/Diff Recent Labs     06/02/22  0513 06/01/22  1055   WBC 4.7 6.1   RBC 2.98* 3.01*   HGB 8.0* 7.9*   HCT 23.2* 23.6*    253   GRANS 71 83*   LYMPH 10* 5*   EOS 4 2        Cardiac Enzymes No results found for: CPK, CK, CKMMB, CKMB, RCK3, CKMBT, CKNDX, CKND1, WILLIE, TROPT, TROIQ, CHANDA, TROPT, TNIPOC, BNP, BNPP     BNP No results found for: BNP, BNPP, XBNPT     Coagulation No results for input(s): PTP, INR, APTT, INREXT, INREXT in the last 72 hours. Thyroid  Lab Results   Component Value Date/Time    TSH 2.38 08/29/2021 01:20 AM       No results found for: T4     Urinalysis Lab Results   Component Value Date/Time    Color YELLOW 03/02/2018 05:41 AM    Appearance CLEAR 03/02/2018 05:41 AM    Specific gravity 1.010 03/02/2018 05:41 AM    pH (UA) 5.0 03/02/2018 05:41 AM    Protein 300 (A) 03/02/2018 05:41 AM    Glucose NEGATIVE  03/02/2018 05:41 AM    Ketone NEGATIVE  03/02/2018 05:41 AM    Bilirubin NEGATIVE  03/02/2018 05:41 AM    Urobilinogen 0.2 03/02/2018 05:41 AM    Nitrites NEGATIVE  03/02/2018 05:41 AM    Leukocyte Esterase NEGATIVE  03/02/2018 05:41 AM    Epithelial cells FEW 03/02/2018 05:41 AM    Bacteria FEW (A) 03/02/2018 05:41 AM    WBC 0 to 2 03/02/2018 05:41 AM    RBC NEGATIVE  03/02/2018 05:41 AM        Micro  No results for input(s): SDES, CULT in the last 72 hours. No results for input(s): CULT in the last 72 hours. Culture data during this hospitalization. All Micro Results     None             CT ABD PELV WO CONT    Result Date: 5/16/2022  EXAM: CT of the abdomen and pelvis CLINICAL INDICATION/HISTORY: LLQ abd pain, constipation and anal pain   > Additional: None. COMPARISON: 09/01/2021   > Reference Exam: None. TECHNIQUE: Axial CT imaging of the abdomen and pelvis was performed without intravenous contrast. Oral contrast not administered. Multiplanar reformats were generated. Dose reduction techniques used: automated exposure control, adjustment of the mAs and/or kVp according to patient size, and iterative reconstruction techniques. Digital Imaging and Communications in Medicine (DICOM) format image data are available to nonaffiliated external healthcare facilities or entities on a secure, media free, reciprocally searchable basis with patient authorization for at least a 12-month period after this study. _______________ FINDINGS: LOWER CHEST: Lung bases are clear. Heart is mildly enlarged without pericardial effusion. LIVER, BILIARY: Liver is unremarkable. No biliary dilation. Gallbladder is markedly contracted but otherwise unremarkable. PANCREAS: Unremarkable. SPLEEN: Unremarkable. ADRENALS: Unremarkable. KIDNEYS/URETERS/BLADDER: No hydronephrosis. No calculi. LYMPH NODES: No enlarged lymph nodes. GASTROINTESTINAL TRACT: No bowel dilation or wall thickening. Colonic diverticulosis. PELVIC ORGANS: Unremarkable. VASCULATURE: Moderate to marked atherosclerosis. BONES: No acute or aggressive osseous abnormalities identified. OTHER: No ascites. _______________     1. No acute intra-abdominal abnormality. Colonic diverticulosis without acute diverticulitis. * **  *        Images report reviewed by me:    CXR reviewed by me:  XR (Most Recent). CXR  reviewed by me and compared with previous CXR Results from Hospital Encounter encounter on 06/01/22    XR CHEST PORT    Narrative  EXAM: XR CHEST PORT    CLINICAL INDICATION/HISTORY: Encephalopathy  -Additional: None    COMPARISON: 1/16/2022    TECHNIQUE: Portable frontal view of the chest    _______________    FINDINGS:    SUPPORT DEVICES: Interval placement of a right IJV vessels catheter with tip at  the cavoatrial junction. HEART AND MEDIASTINUM: Cardiomegaly. LUNGS AND PLEURAL SPACES: Bilateral parenchymal/interstitial haziness. No large  effusion or pneumothorax.    _______________    Impression  Cardiomegaly with pulmonary edema. ·Please note: Voice-recognition software may have been used to generate this report, which may have resulted in some phonetic-based errors in grammar and contents. Even though attempts were made to correct all the mistakes, some may have been missed, and remained in the body of the document.       Deepika Nguyen MD  6/2/2022

## 2022-06-02 NOTE — DIABETES MGMT
Diabetes/ Glycemic Control Plan of Care  Recommendations:      Assessment:  54 y.o. male with h/o T2DM - no PTA diabetes medications/insulin, ESRD on HD, sickle cell trait, right BKA and HTN. BG on admission 46 mg/dL. Hyperkalemic on admission - insulin and glucagon administered in ER.  D10% administered x 2 since, with BG ranging from  mg/dL. Lab glucose this  mg/dL. Diet initiated. Recommend Q2-4 hour POC glucose and as needed until BG stable x 12 hours, then ACHS. Recent Glucose Results:   Lab Results   Component Value Date/Time     (H) 06/02/2022 01:51 AM    GLU 81 06/01/2022 01:36 PM    GLU 56 (L) 06/01/2022 10:55 AM    GLUCPOC 110 06/01/2022 09:24 PM    GLUCPOC 101 06/01/2022 05:58 PM    GLUCPOC 45 (LL) 06/01/2022 04:59 PM      Within target range (70-180mg/dL):   No  Current insulin orders:  None  Total Daily Dose previous 24 hours = 10 units Humulin R - hyperkalemia     Plan/Goals:   Blood glucose will remain within target of 70 - 180 mg/dl with no further hypoglycemia    Education:  [x] Refer to Diabetes Education Record                          [] Education not indicated at this time       [address], [address] [facility]  Glycemic Control Team   Phone:  [phone]  Tues - Thurs 8:30 - 4:30

## 2022-06-02 NOTE — CONSULTS
NEUROLOGY CONSULTATION NOTE    Patient: Kanika Vela MRN: [id number]  CSN: [id number]    YOB: 1970  Age: 54 y.o. Sex: male    DOA: 6/1/2022 LOS:  LOS: 1 day        Requesting Physician: Dr. Enedina Hernandez  Reason for Consultation: Stroke on CT. HISTORY OF PRESENT ILLNESS:   Kanika Vela is a 54 y.o. male with history of end-stage renal disease, sickle cell trait, diabetes, diabetic peripheral neuropathy, right BKA and hypertension, who presented with mental status changes in the setting of hypoglycemia and hyperkalemia. The patient could not get his recent hemodialysis sessions due to IV access issues. When he presented to ER, he was evaluated with a head CT, which showed a new hypodensity on the head CT in the arden. The patient underwent brain MRI, which did not show any acute infarct but chronic lacunar infarctions. The patient takes aspirin 81 mg irregularly. Yesterday, he was restarted on aspirin. Currently, he denies any lateralized weakness or numbness. He does have numbness on his feet due to peripheral neuropathy. He denies any speech or swallowing difficulties. PAST MEDICAL HISTORY:  Past Medical History:   Diagnosis Date    Chronic kidney disease     Diabetes ([facility] [address]     Heart failure ([facility] [address]     Hypertension     Sickle cell trait ([facility] [address]      PAST SURGICAL HISTORY:  Past Surgical History:   Procedure Laterality Date    HX ORTHOPAEDIC      right BKA 2017    IR INSERT NON TUNL CVC OVER 5 YRS  6/1/2022     FAMILY HISTORY:  No family history on file.   SOCIAL HISTORY:  Social History     Socioeconomic History    Marital status: LEGALLY    Tobacco Use    Smoking status: Former Smoker    Smokeless tobacco: Never Used   Substance and Sexual Activity    Alcohol use: No    Drug use: No     MEDICATIONS:  Current Facility-Administered Medications   Medication Dose Route Frequency    dextrose 10 % infusion 125-250 mL  125-250 mL IntraVENous PRN    heparin (porcine) injection 2,000 Units  2,000 Units IntraCATHeter- VENous DIALYSIS PRN    sodium chloride (NS) flush 5-40 mL  5-40 mL IntraVENous Q8H    sodium chloride (NS) flush 5-40 mL  5-40 mL IntraVENous PRN    acetaminophen (TYLENOL) tablet 650 mg  650 mg Oral Q6H PRN    Or    acetaminophen (TYLENOL) suppository 650 mg  650 mg Rectal Q6H PRN    polyethylene glycol (MIRALAX) packet 17 g  17 g Oral DAILY PRN    ondansetron (ZOFRAN ODT) tablet 4 mg  4 mg Oral Q8H PRN    Or    ondansetron (ZOFRAN) injection 4 mg  4 mg IntraVENous Q6H PRN    heparin (porcine) injection 5,000 Units  5,000 Units SubCUTAneous Q8H    pantoprazole (PROTONIX) tablet 40 mg  40 mg Oral ACB    aspirin delayed-release tablet 81 mg  81 mg Oral DAILY    carvediloL (COREG) tablet 25 mg  25 mg Oral BID WITH MEALS    doxazosin (CARDURA) tablet 2 mg  2 mg Oral DAILY    isosorbide mononitrate ER (IMDUR) tablet 60 mg  60 mg Oral DAILY    sevelamer carbonate (RENVELA) tab 800 mg  800 mg Oral TID WITH MEALS    amLODIPine (NORVASC) tablet 10 mg  10 mg Oral DAILY    hydrALAZINE (APRESOLINE) tablet 50 mg  50 mg Oral BID    furosemide (LASIX) tablet 80 mg  80 mg Oral BID    heparin (porcine) 1,000 unit/mL injection 2,600 Units  2,600 Units IntraCATHeter DIALYSIS PRN     Prior to Admission medications    Medication Sig Start Date End Date Taking? Authorizing Provider   aspirin delayed-release 81 mg tablet Take 1 Tablet by mouth daily. 9/7/21   Elissa Bryant, DO   doxazosin (CARDURA) 4 mg tablet Take 2 mg by mouth daily. Provider, Historical   sodium hypochlorite 0.0125% (DAKINS SOLUTION) Apply 1 mL to affected area two (2) times a week. Provider, Historical   omeprazole (PRILOSEC) 40 mg capsule Take 40 mg by mouth daily. Provider, Historical   sevelamer carbonate (RENVELA) 800 mg tab tab Take 800 mg by mouth three (3) times daily (with meals). Provider, Historical   sodium bicarbonate 650 mg tablet Take 650 mg by mouth three (3) times daily. Provider, Historical   amLODIPine (NORVASC) 10 mg tablet Take 1 Tab by mouth daily. 3/8/18   Kalyan De La Torre MD   carvedilol (COREG) 25 mg tablet Take 1 Tab by mouth two (2) times daily (with meals). 3/7/18   Kalyan De La Torre MD   hydrALAZINE (APRESOLINE) 25 mg tablet Take 1 Tab by mouth three (3) times daily. Patient taking differently: Take 50 mg by mouth two (2) times a day. 3/7/18   Kalyan De La Torre MD   isosorbide mononitrate ER (IMDUR) 60 mg CR tablet Take 1 Tab by mouth daily. 3/8/18   Kalyan De La Torre MD   furosemide (LASIX) 80 mg tablet Take 1 Tab by mouth two (2) times a day. 3/7/18   Kalyan De La Torre MD       ALLERGIES:  Allergies   Allergen Reactions    Penicillins Rash    Vancomycin Other (comments)     kayla syndrome       Review of Systems  GENERAL: No fevers or chills. HEENT: No change in vision, earache, tinnitus, sore throat or sinus congestion. NECK: No pain or stiffness. CARDIOVASCULAR: No chest pain or pressure. No palpitations. PULMONARY: No shortness of breath, cough or wheeze. GASTROINTESTINAL: No abdominal pain, nausea, vomiting or diarrhea. GENITOURINARY: No urinary frequency or dysuria. MUSCULOSKELETAL: No joint or muscle pain, no back pain, no recent trauma. DERMATOLOGIC: No rash, no itching, no lesions. ENDOCRINE: No heat or cold intolerance. HEMATOLOGICAL: No anemia or easy bruising or bleeding. NEUROLOGIC: No headache, seizures. AMS. PHYSICAL EXAMINATION:     Visit Vitals  BP (!) 145/116   Pulse 87   Temp 98.5 °F (36.9 °C)   Resp 18   Wt 77.8 kg (171 lb 8.3 oz)   SpO2 92%   BMI 26.86 kg/m²      O2 Device: None (Room air)  GENERAL: Pleasant, in no apparent distress. HEENT: Moist mucous membranes, sclerae anicteric, scalp is atraumatic. CVS: Regular rate and rhythm, no murmurs or gallops. No carotid bruits. PULMONARY: Clear to auscultation bilaterally. No rales or rhonchi. No wheezing. EXTREMITIES: Right BKA. ABDOMEN: Soft, nontender. SKIN: No rashes or ecchymoses.  Warm and dry.  NEUROLOGIC: Alert and oriented x3. Speech is fluent without any aphasia or dysarthria. Cranial nerves: Face is symmetric with symmetric smile. Facial sensation is intact. Extraocular movements are intact with no nystagmus. Visual fields are full to confrontation. PERRL. Tongue is midline. Palate elevates symmetrically. Hearing intact to speech. Sternocleidomastoid and trapezius strengths are full bilaterally. Motor: Normal tone and normal bulk on all four extremities. Strength is full on all four segmentally. There is no pronator drift or orbiting. Sensory: Length dependent sensory loss on the left leg. Coordination: Intact coordination with finger-nose-finger bilaterally. Normal fine movements. No bradykinesia detected. Deep tendon reflexes: Absent. Gait assessment: Deferred. Labs: Results:       Chemistry Recent Labs     06/02/22  0151 06/01/22  1336 06/01/22  1055   * 81 56*   * 137 137   K 4.5 6.3* 6.2*    106 104   CO2 23 13* 14*   BUN 54* 141* 135*   CREA 8.20* 17.60* 17.00*   CA 7.5* 7.7* 7.6*   AGAP 11 18 19*   BUCR 7* 8* 8*   AP  --   --  86   TP  --   --  7.5   ALB  --   --  3.0*   GLOB  --   --  4.5*   AGRAT  --   --  0.7*      CBC w/Diff Recent Labs     06/02/22  0513 06/01/22  1055   WBC 4.7 6.1   RBC 2.98* 3.01*   HGB 8.0* 7.9*   HCT 23.2* 23.6*    253   GRANS 71 83*   LYMPH 10* 5*   EOS 4 2      Cardiac Enzymes No results for input(s): CPK, CKND1, WILLIE in the last 72 hours. No lab exists for component: CKRMB, TROIP   Coagulation No results for input(s): PTP, INR, APTT, INREXT in the last 72 hours.     Lipid Panel Lab Results   Component Value Date/Time    Cholesterol, total 146 08/30/2021 02:50 AM    HDL Cholesterol 45 08/30/2021 02:50 AM    LDL, calculated 81 08/30/2021 02:50 AM    VLDL, calculated 20 08/30/2021 02:50 AM    Triglyceride 100 08/30/2021 02:50 AM    CHOL/HDL Ratio 3.2 08/30/2021 02:50 AM      BNP No results for input(s): BNPP in the last 72 hours.   Liver Enzymes Recent Labs     06/01/22  1055   TP 7.5   ALB 3.0*   AP 86      Thyroid Studies Lab Results   Component Value Date/Time    TSH 2.38 08/29/2021 01:20 AM          Radiology:  MRI BRAIN WO CONT    Result Date: 6/1/2022  Brain MR without contrast: Indication: Mental status change. Decreased level of consciousness. Hypoglycemia. Procedure: Sagittal spin echo T1, axial FSE FLAIR and T2 and axial diffusion weighted scanning was performed. An axial T2* scan optimized to detect hemosiderin and calcium was performed. Coronal spin echo T1and FSE T2 scans were also performed. No contrast was administered. Comparison exam: Head CT 6/1/2022. Brain MRI 8/31/2021. Findings: Diffusion: There are no areas of restricted diffusion, no acute infarction. The T2* scan shows no acute or chronic hemorrhage or abnormal calcifications. Brain parenchyma: Numerous bilateral right greater than left cerebellar hemisphere chronic areas of infarction are noted, unchanged from previous MRI. There are several chronic areas of infarction that are linear on the right and a single lesion on the left. There is a right paracentral upper pontine chronic lacunar small vessel infarction that is new from the previous MRI, centrally fluid signal with some peripheral high signal FLAIR. Very low level ischemic changes parieto-occipital periventricular and deep white matter. No new focal supratentorial lesion, mass or mass effect. Ventricles and sulci: Top normal lateral ventricles particularly frontal horns. Mild prominence of sulci in the perisylvian region. No change from previous. Extra axial: No extra axial fluid collection or mass. Brain vasculature: Normal, no arterial vascular abnormality is noted. Craniocervical Junction: Normal. Extracranial and skull base:  Severe proptosis by imaging criteria likely from lipomatosis and shallow orbits, unchanged from previous. 1. No acute hemorrhage or acute infarction.  2. Numerous bilateral chronic right greater than left cerebellar hemisphere infarctions unchanged from previous. 3. Chronic right paramedian upper pontine lacunar small vessel infarction, new from previous MRI. 4. Mild atrophy and ischemic white matter changes. CT HEAD WO CONT    Result Date: 6/1/2022  EXAM: CT head INDICATION: Confusion. COMPARISON: 8/29/2021 TECHNIQUE: Axial CT imaging of the head was performed without intravenous contrast. Standard multiplanar coronal and sagittal reformatted images were obtained and are included in interpretation. One or more dose reduction techniques were used on this CT: automated exposure control, adjustment of the mAs and/or kVp according to patient size, and iterative reconstruction techniques. The specific techniques used on this CT exam have been documented in the patient's electronic medical record. Digital Imaging and Communications in Medicine (DICOM) format image data are available to nonaffiliated external healthcare facilities or entities on a secure, media free, reciprocally searchable basis with patient authorization for at least a 12-month period after this study. _______________ FINDINGS: BRAIN AND POSTERIOR FOSSA: Right central arden hypodensity, new from prior study. No evidence of acute large vessel transcortical infarct or acute parenchymal hemorrhage. No midline shift or hydrocephalus. EXTRA-AXIAL SPACES AND MENINGES: There are no abnormal extra-axial fluid collections. CALVARIUM: Intact. SINUSES: Clear. OTHER: None. _______________     Right central arden hypodensity, new from prior study, age indeterminate infarct. Correlate clinically. CT ABD PELV WO CONT    Result Date: 5/16/2022  EXAM: CT of the abdomen and pelvis CLINICAL INDICATION/HISTORY: LLQ abd pain, constipation and anal pain   > Additional: None. COMPARISON: 09/01/2021   > Reference Exam: None.  TECHNIQUE: Axial CT imaging of the abdomen and pelvis was performed without intravenous contrast. Oral contrast not administered. Multiplanar reformats were generated. Dose reduction techniques used: automated exposure control, adjustment of the mAs and/or kVp according to patient size, and iterative reconstruction techniques. Digital Imaging and Communications in Medicine (DICOM) format image data are available to nonaffiliated external healthcare facilities or entities on a secure, media free, reciprocally searchable basis with patient authorization for at least a 12-month period after this study. _______________ FINDINGS: LOWER CHEST: Lung bases are clear. Heart is mildly enlarged without pericardial effusion. LIVER, BILIARY: Liver is unremarkable. No biliary dilation. Gallbladder is markedly contracted but otherwise unremarkable. PANCREAS: Unremarkable. SPLEEN: Unremarkable. ADRENALS: Unremarkable. KIDNEYS/URETERS/BLADDER: No hydronephrosis. No calculi. LYMPH NODES: No enlarged lymph nodes. GASTROINTESTINAL TRACT: No bowel dilation or wall thickening. Colonic diverticulosis. PELVIC ORGANS: Unremarkable. VASCULATURE: Moderate to marked atherosclerosis. BONES: No acute or aggressive osseous abnormalities identified. OTHER: No ascites. _______________     1. No acute intra-abdominal abnormality. Colonic diverticulosis without acute diverticulitis. * **  *    XR CHEST PORT    Result Date: 6/2/2022  EXAM: XR CHEST PORT CLINICAL INDICATION/HISTORY: CHF -Additional: None COMPARISON: One day prior TECHNIQUE: Portable frontal view of the chest _______________ FINDINGS: SUPPORT DEVICES: Right IJV catheter with tip at the cavoatrial junction. HEART AND MEDIASTINUM: Cardiomegaly. LUNGS AND PLEURAL SPACES: Bilateral parenchymal/interstitial haziness. No large effusion or pneumothorax. _______________     Cardiomegaly with pulmonary edema.      XR CHEST PORT    Result Date: 6/1/2022  EXAM: XR CHEST PORT CLINICAL INDICATION/HISTORY: Encephalopathy -Additional: None COMPARISON: 1/16/2022 TECHNIQUE: Portable frontal view of the chest _______________ FINDINGS: SUPPORT DEVICES: Interval placement of a right IJV vessels catheter with tip at the cavoatrial junction. HEART AND MEDIASTINUM: Cardiomegaly. LUNGS AND PLEURAL SPACES: Bilateral parenchymal/interstitial haziness. No large effusion or pneumothorax. _______________     Cardiomegaly with pulmonary edema. IR INSERT NON TUNL CVC OVER 5 YRS    Result Date: 6/1/2022  PROCEDURE: NON-TUNNELED DIALYSIS CATHETER PLACEMENT ATTENDING: Sofía Lackey M.D. CONTRAST: None COMPLICATIONS: None MEDICATIONS: Local lidocaine. INDICATION: Renal failure requiring hemodialysis. PROCEDURE: Informed consent was obtained where the risks, benefits and alternatives to the procedure were explained. All elements of maximal sterile barrier technique followed including: cap and mask and sterile gown and sterile gloves and a large sterile sheet and hand hygiene and 2% chlorhexidine for cutaneous antisepsis (or acceptable alternative antiseptics, per current guidelines). Sterile ultrasound gel and a sterile cover for the US probe was used. After successfully identifying a patent right internal jugular vein, real-time ultrasound guidance demonstrated patent right internal jugular vein with normal waveforms. Then, real-time ultrasound guidance was used to puncture the right internal jugular vein. Permanent recording was created for the patient's record. Using this access, a 12 Fr dialysis catheter was placed under fluoroscopic guidance with its tip at the caval atrial junction. The catheter was secured to the skin with sutures, the ports heparinized, and a sterile dressing applied to the area. The catheter may be used immediately. The patient tolerated the procedure well and was transferred from the IR suite in stable condition. Fluoroscopic dose (reference air kerma): 9 mGy     Non-tunneled dialysis catheter placement performed, as discussed above. ASSESSMENT/IMPRESSION:  Chronic lacunar infarctions.   The patient needs to stay on aspirin 81 mg daily regularly. He has increased risk factor for strokes due to hypertension and diabetes. His chart review shows remarkable low B12 level at 160 on May 24. I am not sure if the patient is on B12 replacement. RECOMMENDATIONS:  1. Treatment of underlying toxic/metabolic etiologies  2. Continue aspirin 81 mg daily  3. Check serum B12  4. Start B12 injection treatments  5. Lipid panel  6. Echocardiogram and carotid Doppler ultrasonography studies to complete stroke work-up. 7.  Discontinue acute stroke pathway. The patient does not have any acute strokes. I will follow the patient.  Please do not hesitate to return with any questions.    ------------------------------------  Marely Prasad MD  6/2/2022  9:17 AM

## 2022-06-02 NOTE — PROGRESS NOTES
Hospitalist Progress Note-critical care note     Patient: Austin Valderrama MRN: [id number]  CSN: [id number]    YOB: 1970  Age: 54 y.o. Sex: male    DOA: 6/1/2022 LOS:  LOS: 1 day            Chief complaint: htn , hyperkalemia , av-fistular issue hypoglycemia     Assessment/Plan         Hospital Problems  Date Reviewed: 1/30/2020          Codes Class Noted POA    Hypertension ICD-10-CM: I10  ICD-9-CM: 401.9  6/1/2022 Unknown        AV fistula occlusion ([facility] [address] ICD-10-CM: W17.128M  ICD-9-CM: 996.74  6/1/2022 Unknown        * (Principal) Hyperkalemia ICD-10-CM: E87.5  ICD-9-CM: 276.7  8/27/2021 Unknown        Hypoglycemia ICD-10-CM: E16.2  ICD-9-CM: 251.2  1/30/2020 Unknown        ESRD (end stage renal disease) on dialysis [facility]) ICD-10-CM: N18.6, Z99.2  ICD-9-CM: 585.6, V45.11  1/29/2020 Unknown        Hx of BKA, right ([facility] [address] ICD-10-CM: Z89.511  ICD-9-CM: V49.75  1/29/2020 Yes               Hypertension , hypertensive urgency   Resolved and continue home medication       chf   Continue home medication if no stroke from MRI brain      Hyperkalemia 6.2-6.3-due to missing HD secondary due to AV fistula occlusion  Resolved per hd and continue monitoring      esrd on hd , av fistular occlusion   Received hd last night,   Vascular consult per renal      Anemia -due to esrd , no bleeding reported   Will continue monitoring h/h       Sickle cell trait        Hypoglycemia  Resolved now, continue hypoglycemia protocol,   follow FSG       Acute encephalopathy, LOS   Like due to hypoglycemia. CT head :Right central arden hypodensity, new from prior study  Mri no acute stroke            bka rt :fall precaution     Subjective: feel better, can not walk     Transfer out of icu     Disposition :tbd,   Review of systems:    General: No fevers or chills. + tired   Cardiovascular: No chest pain or pressure. No palpitations. Pulmonary: No shortness of breath. Gastrointestinal: No nausea, vomiting.      Vital signs/Intake and Output:  Visit Vitals  /72   Pulse 89   Temp 98.5 °F (36.9 °C)   Resp 24   Wt 77.8 kg (171 lb 8.3 oz)   SpO2 99%   BMI 26.86 kg/m²     Current Shift:  No intake/output data recorded. Last three shifts:  No intake/output data recorded. Physical Exam:  General: WD, WN. Alert, cooperative, no acute distress    HEENT: NC, Atraumatic. PERRLA, anicteric sclerae. Lungs: CTA Bilaterally. No Wheezing/Rhonchi/Rales. Heart:  Regular  rhythm,  No murmur, No Rubs, No Gallops  Abdomen: Soft, Non distended, Non tender. +Bowel sounds,   Extremities: No c/c/e, rt bka   Psych:   Not anxious or agitated. Neurologic:  No acute neurological deficit. Labs: Results:       Chemistry Recent Labs     06/02/22  0151 06/01/22  1336 06/01/22  1055   * 81 56*   * 137 137   K 4.5 6.3* 6.2*    106 104   CO2 23 13* 14*   BUN 54* 141* 135*   CREA 8.20* 17.60* 17.00*   CA 7.5* 7.7* 7.6*   AGAP 11 18 19*   BUCR 7* 8* 8*   AP  --   --  86   TP  --   --  7.5   ALB  --   --  3.0*   GLOB  --   --  4.5*   AGRAT  --   --  0.7*      CBC w/Diff Recent Labs     06/02/22  0513 06/01/22  1055   WBC 4.7 6.1   RBC 2.98* 3.01*   HGB 8.0* 7.9*   HCT 23.2* 23.6*    253   GRANS 71 83*   LYMPH 10* 5*   EOS 4 2      Cardiac Enzymes No results for input(s): CPK, CKND1, WILLIE in the last 72 hours. No lab exists for component: CKRMB, TROIP   Coagulation No results for input(s): PTP, INR, APTT, INREXT in the last 72 hours. Lipid Panel Lab Results   Component Value Date/Time    Cholesterol, total 146 08/30/2021 02:50 AM    HDL Cholesterol 45 08/30/2021 02:50 AM    LDL, calculated 81 08/30/2021 02:50 AM    VLDL, calculated 20 08/30/2021 02:50 AM    Triglyceride 100 08/30/2021 02:50 AM    CHOL/HDL Ratio 3.2 08/30/2021 02:50 AM      BNP No results for input(s): BNPP in the last 72 hours.    Liver Enzymes Recent Labs     06/01/22  1055   TP 7.5   ALB 3.0*   AP 86      Thyroid Studies Lab Results   Component Value Date/Time    TSH 2.38 08/29/2021 01:20 AM        Procedures/imaging: see electronic medical records for all procedures/Xrays and details which were not copied into this note but were reviewed prior to creation of Plan    MRI BRAIN WO CONT    Result Date: 6/1/2022  Brain MR without contrast: Indication: Mental status change. Decreased level of consciousness. Hypoglycemia. Procedure: Sagittal spin echo T1, axial FSE FLAIR and T2 and axial diffusion weighted scanning was performed. An axial T2* scan optimized to detect hemosiderin and calcium was performed. Coronal spin echo T1and FSE T2 scans were also performed. No contrast was administered. Comparison exam: Head CT 6/1/2022. Brain MRI 8/31/2021. Findings: Diffusion: There are no areas of restricted diffusion, no acute infarction. The T2* scan shows no acute or chronic hemorrhage or abnormal calcifications. Brain parenchyma: Numerous bilateral right greater than left cerebellar hemisphere chronic areas of infarction are noted, unchanged from previous MRI. There are several chronic areas of infarction that are linear on the right and a single lesion on the left. There is a right paracentral upper pontine chronic lacunar small vessel infarction that is new from the previous MRI, centrally fluid signal with some peripheral high signal FLAIR. Very low level ischemic changes parieto-occipital periventricular and deep white matter. No new focal supratentorial lesion, mass or mass effect. Ventricles and sulci: Top normal lateral ventricles particularly frontal horns. Mild prominence of sulci in the perisylvian region. No change from previous. Extra axial: No extra axial fluid collection or mass. Brain vasculature: Normal, no arterial vascular abnormality is noted. Craniocervical Junction: Normal. Extracranial and skull base:  Severe proptosis by imaging criteria likely from lipomatosis and shallow orbits, unchanged from previous.      1. No acute hemorrhage or acute infarction. 2. Numerous bilateral chronic right greater than left cerebellar hemisphere infarctions unchanged from previous. 3. Chronic right paramedian upper pontine lacunar small vessel infarction, new from previous MRI. 4. Mild atrophy and ischemic white matter changes. CT HEAD WO CONT    Result Date: 6/1/2022  EXAM: CT head INDICATION: Confusion. COMPARISON: 8/29/2021 TECHNIQUE: Axial CT imaging of the head was performed without intravenous contrast. Standard multiplanar coronal and sagittal reformatted images were obtained and are included in interpretation. One or more dose reduction techniques were used on this CT: automated exposure control, adjustment of the mAs and/or kVp according to patient size, and iterative reconstruction techniques. The specific techniques used on this CT exam have been documented in the patient's electronic medical record. Digital Imaging and Communications in Medicine (DICOM) format image data are available to nonaffiliated external healthcare facilities or entities on a secure, media free, reciprocally searchable basis with patient authorization for at least a 12-month period after this study. _______________ FINDINGS: BRAIN AND POSTERIOR FOSSA: Right central arden hypodensity, new from prior study. No evidence of acute large vessel transcortical infarct or acute parenchymal hemorrhage. No midline shift or hydrocephalus. EXTRA-AXIAL SPACES AND MENINGES: There are no abnormal extra-axial fluid collections. CALVARIUM: Intact. SINUSES: Clear. OTHER: None. _______________     Right central arden hypodensity, new from prior study, age indeterminate infarct. Correlate clinically. CT ABD PELV WO CONT    Result Date: 5/16/2022  EXAM: CT of the abdomen and pelvis CLINICAL INDICATION/HISTORY: LLQ abd pain, constipation and anal pain   > Additional: None. COMPARISON: 09/01/2021   > Reference Exam: None.  TECHNIQUE: Axial CT imaging of the abdomen and pelvis was performed without intravenous contrast. Oral contrast not administered. Multiplanar reformats were generated. Dose reduction techniques used: automated exposure control, adjustment of the mAs and/or kVp according to patient size, and iterative reconstruction techniques. Digital Imaging and Communications in Medicine (DICOM) format image data are available to nonaffiliated external healthcare facilities or entities on a secure, media free, reciprocally searchable basis with patient authorization for at least a 12-month period after this study. _______________ FINDINGS: LOWER CHEST: Lung bases are clear. Heart is mildly enlarged without pericardial effusion. LIVER, BILIARY: Liver is unremarkable. No biliary dilation. Gallbladder is markedly contracted but otherwise unremarkable. PANCREAS: Unremarkable. SPLEEN: Unremarkable. ADRENALS: Unremarkable. KIDNEYS/URETERS/BLADDER: No hydronephrosis. No calculi. LYMPH NODES: No enlarged lymph nodes. GASTROINTESTINAL TRACT: No bowel dilation or wall thickening. Colonic diverticulosis. PELVIC ORGANS: Unremarkable. VASCULATURE: Moderate to marked atherosclerosis. BONES: No acute or aggressive osseous abnormalities identified. OTHER: No ascites. _______________     1. No acute intra-abdominal abnormality. Colonic diverticulosis without acute diverticulitis. * **  *    XR CHEST PORT    Result Date: 6/1/2022  EXAM: XR CHEST PORT CLINICAL INDICATION/HISTORY: Encephalopathy -Additional: None COMPARISON: 1/16/2022 TECHNIQUE: Portable frontal view of the chest _______________ FINDINGS: SUPPORT DEVICES: Interval placement of a right IJV vessels catheter with tip at the cavoatrial junction. HEART AND MEDIASTINUM: Cardiomegaly. LUNGS AND PLEURAL SPACES: Bilateral parenchymal/interstitial haziness. No large effusion or pneumothorax. _______________     Cardiomegaly with pulmonary edema.     IR INSERT NON TUNL CVC OVER 5 YRS    Result Date: 6/1/2022  PROCEDURE: NON-TUNNELED DIALYSIS CATHETER PLACEMENT ATTENDING: Olga Terrell M.D. CONTRAST: None COMPLICATIONS: None MEDICATIONS: Local lidocaine. INDICATION: Renal failure requiring hemodialysis. PROCEDURE: Informed consent was obtained where the risks, benefits and alternatives to the procedure were explained. All elements of maximal sterile barrier technique followed including: cap and mask and sterile gown and sterile gloves and a large sterile sheet and hand hygiene and 2% chlorhexidine for cutaneous antisepsis (or acceptable alternative antiseptics, per current guidelines). Sterile ultrasound gel and a sterile cover for the US probe was used. After successfully identifying a patent right internal jugular vein, real-time ultrasound guidance demonstrated patent right internal jugular vein with normal waveforms. Then, real-time ultrasound guidance was used to puncture the right internal jugular vein. Permanent recording was created for the patient's record. Using this access, a 12 Fr dialysis catheter was placed under fluoroscopic guidance with its tip at the caval atrial junction. The catheter was secured to the skin with sutures, the ports heparinized, and a sterile dressing applied to the area. The catheter may be used immediately. The patient tolerated the procedure well and was transferred from the IR suite in stable condition. Fluoroscopic dose (reference air kerma): 9 mGy     Non-tunneled dialysis catheter placement performed, as discussed above.        Michelle Recio MD

## 2022-06-02 NOTE — CONSULTS
VASCULAR CONSULTATION NOTE    A vascular consultation has been requested on Connie Dai, who is a 54 y.o. male admitted to the hospital on 6/1/2022 with an admitting diagnosis of Hyperkalemia [E87.5]  Hypertension [I10]  ESRD (end stage renal disease) on dialysis ([address] [N18.6, Z99.2]  AV fistula occlusion ([address] [T82.898A]  Hypoglycemia [E16.2]. He is being seen for evaluation and possible treatment of esrd, mechanical complication of arterial venous graft. Attending Physician: Dr. Dorothy Quintero             Referring Physician: Dr. Catherine Francis    HPI:  This is a 55 yo male who was admitted with mental status changes. He has a history of end stage renal disease on hemodialysis. At time of admission he was also found to have a thrombosed left arm arterial venous graft and hyperkalemia. He subsequently had placement of right internal jugular vein temporary hemodialysis catheter. Pt states that recently his left arm graft was placed about 5 years ago at [facility]. He states that his outpatient hemodialysis unit has been having difficulty successfully cannulating his graft. He states that he recently had a procedure on his graft at [facility]. He has a history of esrd. DM.  HTN. S/p right below knee amputation.         Patient Active Problem List   Diagnosis Code    Iron deficiency anemia D50.9    Other restrictive cardiomyopathy ([address] I42.5    ESRD (end stage renal disease) on dialysis ([address] N18.6, Z99.2    HTN (hypertension) I10    Hx of BKA, right ([address] Z89.511    Hypoglycemia E16.2    Hyperkalemia E87.5    Uremia N19    Non-compliance with renal dialysis ([address] Z91.15    Toxic metabolic encephalopathy W59.0    Hypertension I10    AV fistula occlusion ([address] R36.200N       Past Medical History:   Diagnosis Date    Chronic kidney disease     Diabetes ([address]     Heart failure ([address]     Hypertension  Sickle cell trait ([facility] [address]      Past Surgical History:   Procedure Laterality Date    HX ORTHOPAEDIC      right BKA 2017    IR INSERT NON TUNL CVC OVER 5 YRS  6/1/2022     @socr@  No family history on file. Risk factors: The patient has the following risk factors as above    The patient denies a history of as above. Allergies   Allergen Reactions    Penicillins Rash    Vancomycin Other (comments)     kayla syndrome       Home Medications:   [unfilled]    In Patient Medications:   [unfilled]    Review of Systems:   Constitutional: negative for fevers and chills  Eyes: negative for visual disturbance  Ears, nose, mouth, throat, and face: negative for epistaxis  Respiratory: negative for cough, hemoptysis or dyspnea on exertion  Cardiovascular: negative for chest pain, irregular heart beats, lower extremity edema  Gastrointestinal: negative for nausea, vomiting and abdominal pain  Integument/breast: negative for rash  Hematologic/lymphatic: negative for easy bruising and bleeding  Musculoskeletal:negative for myalgias and back pain  Neurological: negative for headaches, vertigo and memory problems  Behavioral/Psych: negative for depression  Endocrine: negative for diabetic symptoms including poor wound healing  Allergic/Immunologic: negative for anaphylaxis    Physical Assessment:   [unfilled]    HEENT:  Normal cephalic, atraumatic. No scleral icterus. Mucus membranes pink and moist.   Neck:  There is a right internal jugular vein temporary hemodialysis catheter in place. No jvd. Lungs:  Cta. Card:  RRR. Abd:  Positive bowel sounds, soft, non-tender. Ext:  Left arm arterial venous graft is without thrill or bruit. There is a palpable radial pulse. The left leg is soft and warm. There is a well healed right bka.         Lab      CBC:   Lab Results   Component Value Date/Time    WBC 4.7 06/02/2022 05:13 AM    RBC 2.98 (L) 06/02/2022 05:13 AM     BMP:   Lab Results   Component Value Date/Time CO2 23 06/02/2022 01:51 AM    BUN 54 (H) 06/02/2022 01:51 AM       Xray   N/a. PVL   N/a. Impression:     3. 53 yo male with esrd on hemodialysis. 2. Mechanical complication of arterial venous graft, left arm. Plan:     Plan for thrombectomy of left arm arterial venous graft tomorrow with possible tunneled hemodialysis catheter placement. Pt is in agreement to proceed. Thank you for allowing us to participate in the care of this patient. Tasha Garcia PA-C   [phone].

## 2022-06-02 NOTE — WOUND CARE
Wound Care Note:    Chart audited for low olga score, patient with high risk for skin breakdown, no documented wounds at time of audit.      Skin Care & Pressure Relief Recommendations  Minimize layers of linen  Pads under patient to optimize support surface  Turn/reposition approximately every 2 hours  Pillow wedges  Manage incontinence   Promote continence; Skin Protective lotion/cream to buttocks and sacrum daily and as needed with incontinence care  Offload heels pillows    Consult wound care if any wounds/ skin care needs noted during admission

## 2022-06-02 NOTE — PROGRESS NOTES
Reason for Admission:   Hyperkalemia [E87.5]  Hypertension [I10]  ESRD (end stage renal disease) on dialysis ([facility] [address] [N18.6, Z99.2]  AV fistula occlusion ([facility] [address] [T82.898A]  Hypoglycemia [E16.2]    PCP: Nataliia Montero MD               RUR Score:     19             Resources/supports,as identified by patient/family:   [facility]       Barriers to discharge: no barriers only if problems occur with maintaining dialysis chair. Current Advanced Directive/Advance Care Plan:   Yes NO    Healthcare Decision Maker:   Primary Decision Maker: Dorian Hatch - [phone]    Click here to complete [id number] including selection of the Healthcare Decision Maker Relationship (ie \"Primary\")    Advance Care Planning 12/22/2020   Patient's Healthcare Decision Maker is: -   Primary Decision Maker Name -   Primary Decision Maker Phone Number -   Confirm Advance Directive Yes, on file                             Plan for utilizing home health:    yes                      Likelihood of readmission:   HIGH    [unfilled]    Transition of Care Plan:   Cm met with pt at bedside to introduce self and explain cm role as discharge planner, pt informed cm that he plans to return back home where he lives alone but has his brother and friend support. Pt stated he drives to his dialysis appointments, when feeling not well his brother or step sisters usually assist, pt does have his prosthesis at bedside for his  Right BKA  ,pt does not recall last pcp visit stated he has a hard time etting thru his pcp office to schedule, cm offered assistance with f/u appointments pt accepted. Initial assessment completed with patient. Cognitive status of patient: ORIENTATION[de-identified] to person,place,time and situation. Face sheet information confirmed:  yes  The patient designates Junior Alanis to participate in his discharge plan and to receive any needed information. This patient lives in a apartment alone .  Patient is able to navigate steps as needed. Prior to hospitalization, patient was considered to be independent with ADLs/IADLS : Yes    Patient has a current ACP document on file: yes  The patients brother or other family will be available to transport patient home upon discharge. .    This patient is on dialysis/days :yes     Patient  receives treatment on Monday,wednesday and friday at [facility]        The patient states that he can obtain his medications from the pharmacy, and take his medications as directed. Patient's current insurance is [employer] medicaid      Care Management Interventions  PCP Verified by CM: Yes  Palliative Care Criteria Met (RRAT>21 & CHF Dx)?: No  Mode of Transport at Discharge:  Other (see comment) (family)  Support Systems: Other Family Member(s)  Confirm Follow Up Transport: Family  The Plan for Transition of Care is Related to the Following Treatment Goals : home with family support  Discharge Location  Patient Expects to be Discharged to[de-identified] Home

## 2022-06-02 NOTE — PROGRESS NOTES
Carotid US is reasonable:    Bilateral less than 50% stenosis of the internal carotid arteries. No significant stenosis in the external carotid arteries bilaterally. Antegrade flow in both vertebral arteries. Normal flow in both subclavian arteries      B12 and folate are normal.    LDL is at goal.      No further recommendations from neurology. Continue aspirin as I recommended before. Neurology signs off.

## 2022-06-02 NOTE — PROGRESS NOTES
0800-Received pt resting in bed with eyes closed, no sign of distress, vss on room air, will continue to monitor  1200-Pt sitting up in bed, eating lunch, tolerating well, BP low after medications, pt asymptomatic, will continue to monitor  1600-MAP continues to be below 65, discussed with hospitalist, plan to keep in ICU overnight and hold blood pressure medications this afternoon, pt, alert and denies distress  1913-Bedside and Verbal shift change report given to Anirudh Gambino (oncoming nurse) by Miladis Lyons RN (offgoing nurse).  Report included the following information SBAR, Kardex, Intake/Output, MAR, Recent Results and Cardiac Rhythm SR.

## 2022-06-03 ENCOUNTER — APPOINTMENT (OUTPATIENT)
Dept: INTERVENTIONAL RADIOLOGY/VASCULAR | Age: 52
DRG: 270 | End: 2022-06-03
Attending: STUDENT IN AN ORGANIZED HEALTH CARE EDUCATION/TRAINING PROGRAM
Payer: MEDICARE

## 2022-06-03 LAB
ANION GAP SERPL CALC-SCNC: 10 MMOL/L (ref 3–18)
BASOPHILS # BLD: 0 K/UL (ref 0–0.1)
BASOPHILS NFR BLD: 1 % (ref 0–2)
BUN SERPL-MCNC: 70 MG/DL (ref 7–18)
BUN/CREAT SERPL: 7 (ref 12–20)
CALCIUM SERPL-MCNC: 7.4 MG/DL (ref 8.5–10.1)
CHLORIDE SERPL-SCNC: 102 MMOL/L (ref 100–111)
CO2 SERPL-SCNC: 24 MMOL/L (ref 21–32)
CREAT SERPL-MCNC: 10.5 MG/DL (ref 0.6–1.3)
DIFFERENTIAL METHOD BLD: ABNORMAL
EOSINOPHIL # BLD: 0.2 K/UL (ref 0–0.4)
EOSINOPHIL NFR BLD: 4 % (ref 0–5)
ERYTHROCYTE [DISTWIDTH] IN BLOOD BY AUTOMATED COUNT: 18.5 % (ref 11.6–14.5)
GLUCOSE BLD STRIP.AUTO-MCNC: 120 MG/DL (ref 70–110)
GLUCOSE BLD STRIP.AUTO-MCNC: 73 MG/DL (ref 70–110)
GLUCOSE BLD STRIP.AUTO-MCNC: 87 MG/DL (ref 70–110)
GLUCOSE BLD STRIP.AUTO-MCNC: 87 MG/DL (ref 70–110)
GLUCOSE SERPL-MCNC: 100 MG/DL (ref 74–99)
HBV SURFACE AB SER QL IA: POSITIVE
HBV SURFACE AB SERPL IA-ACNC: 15.33 MIU/ML
HBV SURFACE AG SER QL: <0.1 INDEX
HBV SURFACE AG SER QL: NEGATIVE
HCT VFR BLD AUTO: 23.1 % (ref 36–48)
HEP BS AB COMMENT,HBSAC: NORMAL
HGB BLD-MCNC: 7.8 G/DL (ref 13–16)
IMM GRANULOCYTES # BLD AUTO: 0 K/UL (ref 0–0.04)
IMM GRANULOCYTES NFR BLD AUTO: 0 % (ref 0–0.5)
LYMPHOCYTES # BLD: 0.6 K/UL (ref 0.9–3.6)
LYMPHOCYTES NFR BLD: 11 % (ref 21–52)
MAGNESIUM SERPL-MCNC: 2.9 MG/DL (ref 1.6–2.6)
MCH RBC QN AUTO: 26.4 PG (ref 24–34)
MCHC RBC AUTO-ENTMCNC: 33.8 G/DL (ref 31–37)
MCV RBC AUTO: 78 FL (ref 78–100)
MONOCYTES # BLD: 0.7 K/UL (ref 0.05–1.2)
MONOCYTES NFR BLD: 14 % (ref 3–10)
NEUTS SEG # BLD: 3.6 K/UL (ref 1.8–8)
NEUTS SEG NFR BLD: 70 % (ref 40–73)
NRBC # BLD: 0 K/UL (ref 0–0.01)
NRBC BLD-RTO: 0 PER 100 WBC
PHOSPHATE SERPL-MCNC: 8.1 MG/DL (ref 2.5–4.9)
PLATELET # BLD AUTO: 233 K/UL (ref 135–420)
PMV BLD AUTO: 9.7 FL (ref 9.2–11.8)
POTASSIUM SERPL-SCNC: 4.4 MMOL/L (ref 3.5–5.5)
RBC # BLD AUTO: 2.96 M/UL (ref 4.35–5.65)
SODIUM SERPL-SCNC: 136 MMOL/L (ref 136–145)
WBC # BLD AUTO: 5.2 K/UL (ref 4.6–13.2)

## 2022-06-03 PROCEDURE — 84100 ASSAY OF PHOSPHORUS: CPT

## 2022-06-03 PROCEDURE — 0JH63XZ INSERTION OF TUNNELED VASCULAR ACCESS DEVICE INTO CHEST SUBCUTANEOUS TISSUE AND FASCIA, PERCUTANEOUS APPROACH: ICD-10-PCS | Performed by: STUDENT IN AN ORGANIZED HEALTH CARE EDUCATION/TRAINING PROGRAM

## 2022-06-03 PROCEDURE — 74011250636 HC RX REV CODE- 250/636: Performed by: HOSPITALIST

## 2022-06-03 PROCEDURE — 99152 MOD SED SAME PHYS/QHP 5/>YRS: CPT

## 2022-06-03 PROCEDURE — X2CR3T7 EXTIRPATION OF MATTER FROM LEFT UPPER EXTREMITY VEIN USING COMPUTER-AIDED MECHANICAL ASPIRATION, PERCUTANEOUS APPROACH, NEW TECHNOLOGY GROUP 7: ICD-10-PCS | Performed by: STUDENT IN AN ORGANIZED HEALTH CARE EDUCATION/TRAINING PROGRAM

## 2022-06-03 PROCEDURE — 85025 COMPLETE CBC W/AUTO DIFF WBC: CPT

## 2022-06-03 PROCEDURE — 36415 COLL VENOUS BLD VENIPUNCTURE: CPT

## 2022-06-03 PROCEDURE — 83735 ASSAY OF MAGNESIUM: CPT

## 2022-06-03 PROCEDURE — 65610000006 HC RM INTENSIVE CARE

## 2022-06-03 PROCEDURE — 03783ZZ DILATION OF LEFT BRACHIAL ARTERY, PERCUTANEOUS APPROACH: ICD-10-PCS | Performed by: STUDENT IN AN ORGANIZED HEALTH CARE EDUCATION/TRAINING PROGRAM

## 2022-06-03 PROCEDURE — 77030013687 IR DECLOT AV GRAFT PERCUTANEOUS

## 2022-06-03 PROCEDURE — 74011000250 HC RX REV CODE- 250: Performed by: HOSPITALIST

## 2022-06-03 PROCEDURE — 74011000636 HC RX REV CODE- 636: Performed by: STUDENT IN AN ORGANIZED HEALTH CARE EDUCATION/TRAINING PROGRAM

## 2022-06-03 PROCEDURE — 74011250637 HC RX REV CODE- 250/637: Performed by: HOSPITALIST

## 2022-06-03 PROCEDURE — 82962 GLUCOSE BLOOD TEST: CPT

## 2022-06-03 PROCEDURE — B51V1ZZ FLUOROSCOPY OF OTHER VEINS USING LOW OSMOLAR CONTRAST: ICD-10-PCS | Performed by: STUDENT IN AN ORGANIZED HEALTH CARE EDUCATION/TRAINING PROGRAM

## 2022-06-03 PROCEDURE — 80048 BASIC METABOLIC PNL TOTAL CA: CPT

## 2022-06-03 PROCEDURE — 90935 HEMODIALYSIS ONE EVALUATION: CPT

## 2022-06-03 PROCEDURE — 74011250636 HC RX REV CODE- 250/636: Performed by: STUDENT IN AN ORGANIZED HEALTH CARE EDUCATION/TRAINING PROGRAM

## 2022-06-03 PROCEDURE — 74011000250 HC RX REV CODE- 250: Performed by: STUDENT IN AN ORGANIZED HEALTH CARE EDUCATION/TRAINING PROGRAM

## 2022-06-03 PROCEDURE — 74011250637 HC RX REV CODE- 250/637: Performed by: INTERNAL MEDICINE

## 2022-06-03 PROCEDURE — 99153 MOD SED SAME PHYS/QHP EA: CPT

## 2022-06-03 PROCEDURE — 74011250637 HC RX REV CODE- 250/637: Performed by: STUDENT IN AN ORGANIZED HEALTH CARE EDUCATION/TRAINING PROGRAM

## 2022-06-03 PROCEDURE — 74011250636 HC RX REV CODE- 250/636: Performed by: INTERNAL MEDICINE

## 2022-06-03 PROCEDURE — 02HV33Z INSERTION OF INFUSION DEVICE INTO SUPERIOR VENA CAVA, PERCUTANEOUS APPROACH: ICD-10-PCS | Performed by: STUDENT IN AN ORGANIZED HEALTH CARE EDUCATION/TRAINING PROGRAM

## 2022-06-03 RX ORDER — HEPARIN SODIUM 1000 [USP'U]/ML
10000 INJECTION, SOLUTION INTRAVENOUS; SUBCUTANEOUS
Status: DISPENSED | OUTPATIENT
Start: 2022-06-03 | End: 2022-06-04

## 2022-06-03 RX ORDER — HEPARIN SODIUM 1000 [USP'U]/ML
1000-10000 INJECTION, SOLUTION INTRAVENOUS; SUBCUTANEOUS
Status: DISCONTINUED | OUTPATIENT
Start: 2022-06-03 | End: 2022-06-07 | Stop reason: HOSPADM

## 2022-06-03 RX ORDER — FENTANYL CITRATE 50 UG/ML
25-100 INJECTION, SOLUTION INTRAMUSCULAR; INTRAVENOUS
Status: DISCONTINUED | OUTPATIENT
Start: 2022-06-03 | End: 2022-06-03

## 2022-06-03 RX ORDER — HEPARIN SODIUM 200 [USP'U]/100ML
500 INJECTION, SOLUTION INTRAVENOUS
Status: COMPLETED | OUTPATIENT
Start: 2022-06-03 | End: 2022-06-03

## 2022-06-03 RX ORDER — SODIUM CHLORIDE 0.9 % (FLUSH) 0.9 %
5-40 SYRINGE (ML) INJECTION AS NEEDED
Status: DISCONTINUED | OUTPATIENT
Start: 2022-06-03 | End: 2022-06-04 | Stop reason: SDUPTHER

## 2022-06-03 RX ORDER — CLINDAMYCIN PHOSPHATE 900 MG/50ML
900 INJECTION INTRAVENOUS
Status: COMPLETED | OUTPATIENT
Start: 2022-06-03 | End: 2022-06-03

## 2022-06-03 RX ORDER — SODIUM CHLORIDE 0.9 % (FLUSH) 0.9 %
5-40 SYRINGE (ML) INJECTION EVERY 8 HOURS
Status: DISCONTINUED | OUTPATIENT
Start: 2022-06-03 | End: 2022-06-07 | Stop reason: HOSPADM

## 2022-06-03 RX ORDER — LIDOCAINE HYDROCHLORIDE 10 MG/ML
1-10 INJECTION, SOLUTION EPIDURAL; INFILTRATION; INTRACAUDAL; PERINEURAL
Status: COMPLETED | OUTPATIENT
Start: 2022-06-03 | End: 2022-06-03

## 2022-06-03 RX ORDER — MIDAZOLAM HYDROCHLORIDE 1 MG/ML
.5-2 INJECTION, SOLUTION INTRAMUSCULAR; INTRAVENOUS
Status: DISCONTINUED | OUTPATIENT
Start: 2022-06-03 | End: 2022-06-03

## 2022-06-03 RX ORDER — WATER FOR INJECTION,STERILE
VIAL (ML) INJECTION
Status: DISPENSED
Start: 2022-06-03 | End: 2022-06-04

## 2022-06-03 RX ORDER — HEPARIN SODIUM 200 [USP'U]/100ML
INJECTION, SOLUTION INTRAVENOUS
Status: DISPENSED
Start: 2022-06-03 | End: 2022-06-04

## 2022-06-03 RX ORDER — LIDOCAINE HYDROCHLORIDE 10 MG/ML
INJECTION, SOLUTION EPIDURAL; INFILTRATION; INTRACAUDAL; PERINEURAL
Status: DISPENSED
Start: 2022-06-03 | End: 2022-06-04

## 2022-06-03 RX ORDER — NALOXONE HYDROCHLORIDE 0.4 MG/ML
0.1 INJECTION, SOLUTION INTRAMUSCULAR; INTRAVENOUS; SUBCUTANEOUS
Status: DISCONTINUED | OUTPATIENT
Start: 2022-06-03 | End: 2022-06-03

## 2022-06-03 RX ORDER — FLUMAZENIL 0.1 MG/ML
0.2 INJECTION INTRAVENOUS
Status: DISCONTINUED | OUTPATIENT
Start: 2022-06-03 | End: 2022-06-03

## 2022-06-03 RX ADMIN — HEPARIN SODIUM 5000 UNITS: 5000 INJECTION INTRAVENOUS; SUBCUTANEOUS at 00:01

## 2022-06-03 RX ADMIN — CARVEDILOL 12.5 MG: 12.5 TABLET, FILM COATED ORAL at 08:12

## 2022-06-03 RX ADMIN — CLINDAMYCIN PHOSPHATE 900 MG: 900 INJECTION, SOLUTION INTRAVENOUS at 14:46

## 2022-06-03 RX ADMIN — FENTANYL CITRATE 25 MCG: 50 INJECTION, SOLUTION INTRAMUSCULAR; INTRAVENOUS at 15:50

## 2022-06-03 RX ADMIN — HEPARIN SODIUM 1000 UNITS: 1000 INJECTION INTRAVENOUS; SUBCUTANEOUS at 15:40

## 2022-06-03 RX ADMIN — FUROSEMIDE 80 MG: 40 TABLET ORAL at 22:31

## 2022-06-03 RX ADMIN — HEPARIN SODIUM 3200 UNITS: 1000 INJECTION INTRAVENOUS; SUBCUTANEOUS at 22:37

## 2022-06-03 RX ADMIN — FENTANYL CITRATE 50 MCG: 50 INJECTION, SOLUTION INTRAMUSCULAR; INTRAVENOUS at 15:03

## 2022-06-03 RX ADMIN — HEPARIN SODIUM IN SODIUM CHLORIDE 1000 UNITS: 200 INJECTION INTRAVENOUS at 15:08

## 2022-06-03 RX ADMIN — IOPAMIDOL 30 ML: 612 INJECTION, SOLUTION INTRAVENOUS at 16:11

## 2022-06-03 RX ADMIN — SODIUM CHLORIDE, PRESERVATIVE FREE 10 ML: 5 INJECTION INTRAVENOUS at 22:29

## 2022-06-03 RX ADMIN — HEPARIN SODIUM 5000 UNITS: 1000 INJECTION INTRAVENOUS; SUBCUTANEOUS at 15:10

## 2022-06-03 RX ADMIN — MIDAZOLAM HYDROCHLORIDE 0.5 MG: 1 INJECTION, SOLUTION INTRAMUSCULAR; INTRAVENOUS at 16:21

## 2022-06-03 RX ADMIN — LIDOCAINE HYDROCHLORIDE ANHYDROUS 6 ML: 10 INJECTION, SOLUTION INFILTRATION at 16:10

## 2022-06-03 RX ADMIN — FUROSEMIDE 80 MG: 40 TABLET ORAL at 08:11

## 2022-06-03 RX ADMIN — SODIUM CHLORIDE, PRESERVATIVE FREE 10 ML: 5 INJECTION INTRAVENOUS at 22:28

## 2022-06-03 RX ADMIN — MIDAZOLAM HYDROCHLORIDE 0.5 MG: 1 INJECTION, SOLUTION INTRAMUSCULAR; INTRAVENOUS at 15:50

## 2022-06-03 RX ADMIN — FENTANYL CITRATE 25 MCG: 50 INJECTION, SOLUTION INTRAMUSCULAR; INTRAVENOUS at 16:21

## 2022-06-03 RX ADMIN — AMLODIPINE BESYLATE 5 MG: 5 TABLET ORAL at 08:11

## 2022-06-03 RX ADMIN — MIDAZOLAM HYDROCHLORIDE 1 MG: 1 INJECTION, SOLUTION INTRAMUSCULAR; INTRAVENOUS at 15:03

## 2022-06-03 RX ADMIN — DOXAZOSIN 2 MG: 1 TABLET ORAL at 08:12

## 2022-06-03 RX ADMIN — HEPARIN SODIUM 5000 UNITS: 5000 INJECTION INTRAVENOUS; SUBCUTANEOUS at 23:29

## 2022-06-03 RX ADMIN — SODIUM CHLORIDE, PRESERVATIVE FREE 10 ML: 5 INJECTION INTRAVENOUS at 05:35

## 2022-06-03 RX ADMIN — PANTOPRAZOLE SODIUM 40 MG: 40 TABLET, DELAYED RELEASE ORAL at 08:12

## 2022-06-03 RX ADMIN — ISOSORBIDE MONONITRATE 60 MG: 60 TABLET, EXTENDED RELEASE ORAL at 08:11

## 2022-06-03 NOTE — PROGRESS NOTES
Hospitalist Progress Note-critical care note     Patient: Maco Kelley MRN: [id number]  CSN: [id number]    YOB: 1970  Age: 54 y.o. Sex: male    DOA: 6/1/2022 LOS:  LOS: 2 days            Chief complaint: htn , hyperkalemia , av-fistular issue hypoglycemia     Assessment/Plan         Hospital Problems  Date Reviewed: 1/30/2020          Codes Class Noted POA    Hypertension ICD-10-CM: I10  ICD-9-CM: 401.9  6/1/2022 Unknown        AV fistula occlusion ([facility] [address] ICD-10-CM: M20.144G  ICD-9-CM: 996.74  6/1/2022 Unknown        * (Principal) Hyperkalemia ICD-10-CM: E87.5  ICD-9-CM: 276.7  8/27/2021 Unknown        Hypoglycemia ICD-10-CM: E16.2  ICD-9-CM: 251.2  1/30/2020 Unknown        ESRD (end stage renal disease) on dialysis [facility]) ICD-10-CM: N18.6, Z99.2  ICD-9-CM: 585.6, V45.11  1/29/2020 Unknown        Hx of BKA, right ([facility] [address] ICD-10-CM: Z89.511  ICD-9-CM: V49.75  1/29/2020 Yes               Hypertension , hypertensive urgency   Resolved and continue home medication    Hypotension yesterday afternoon, better now      chf   Continue current meds        esrd on hd , av fistular occlusion   Received hd last night,   Vascular will do thrombectomy today      Anemia -due to esrd , no bleeding reported   H/h stable       Sickle cell trait        Hypoglycemia  Resolved now, continue hypoglycemia protocol,   follow FSG       Acute encephalopathy, LOS   Like due to hypoglycemia. CT head :Right central arden hypodensity, new from prior study  Mri no acute stroke            bka rt :fall precaution     Subjective: feel fine , take 3 medication for htn at home      Home meds need to be verified     Disposition :tbd,   Review of systems:    General: No fevers or chills. + tired   Cardiovascular: No chest pain or pressure. No palpitations. Pulmonary: No shortness of breath. Gastrointestinal: No nausea, vomiting.      Vital signs/Intake and Output:  Visit Vitals  BP (!) 131/57   Pulse 89   Temp 98.7 °F (37.1 °C)   Resp 18   Ht 5' 7\" (1.702 m)   Wt 77.6 kg (171 lb)   SpO2 99%   BMI 26.78 kg/m²     Current Shift:  No intake/output data recorded. Last three shifts:  No intake/output data recorded. Physical Exam:  General: WD, WN. Alert, cooperative, no acute distress    HEENT: NC, Atraumatic. PERRLA, anicteric sclerae. Lungs: CTA Bilaterally. No Wheezing/Rhonchi/Rales. Heart:  Regular  rhythm,  No murmur, No Rubs, No Gallops  Abdomen: Soft, Non distended, Non tender. +Bowel sounds,   Extremities: No c/c/e, rt bka   Psych:   Not anxious or agitated. Neurologic:  No acute neurological deficit. Labs: Results:       Chemistry Recent Labs     06/03/22 0419 06/02/22  0151 06/01/22  1336 06/01/22  1055 06/01/22  1055   * 101* 81   < > 56*    135* 137   < > 137   K 4.4 4.5 6.3*   < > 6.2*    101 106   < > 104   CO2 24 23 13*   < > 14*   BUN 70* 54* 141*   < > 135*   CREA 10.50* 8.20* 17.60*   < > 17.00*   CA 7.4* 7.5* 7.7*   < > 7.6*   AGAP 10 11 18   < > 19*   BUCR 7* 7* 8*   < > 8*   AP  --   --   --   --  86   TP  --   --   --   --  7.5   ALB  --   --   --   --  3.0*   GLOB  --   --   --   --  4.5*   AGRAT  --   --   --   --  0.7*    < > = values in this interval not displayed. CBC w/Diff Recent Labs     06/03/22  0419 06/02/22  0513 06/01/22  1055   WBC 5.2 4.7 6.1   RBC 2.96* 2.98* 3.01*   HGB 7.8* 8.0* 7.9*   HCT 23.1* 23.2* 23.6*    217 253   GRANS 70 71 83*   LYMPH 11* 10* 5*   EOS 4 4 2      Cardiac Enzymes No results for input(s): CPK, CKND1, WILLIE in the last 72 hours. No lab exists for component: CKRMB, TROIP   Coagulation No results for input(s): PTP, INR, APTT, INREXT, INREXT in the last 72 hours.     Lipid Panel Lab Results   Component Value Date/Time    Cholesterol, total 115 06/02/2022 09:45 AM    HDL Cholesterol 62 (H) 06/02/2022 09:45 AM    LDL, calculated 46 06/02/2022 09:45 AM    VLDL, calculated 7 06/02/2022 09:45 AM    Triglyceride 35 06/02/2022 09:45 AM    CHOL/HDL Ratio 1.9 06/02/2022 09:45 AM      BNP No results for input(s): BNPP in the last 72 hours. Liver Enzymes Recent Labs     06/01/22  1055   TP 7.5   ALB 3.0*   AP 86      Thyroid Studies Lab Results   Component Value Date/Time    TSH 2.38 08/29/2021 01:20 AM        Procedures/imaging: see electronic medical records for all procedures/Xrays and details which were not copied into this note but were reviewed prior to creation of Plan    MRI BRAIN WO CONT    Result Date: 6/1/2022  Brain MR without contrast: Indication: Mental status change. Decreased level of consciousness. Hypoglycemia. Procedure: Sagittal spin echo T1, axial FSE FLAIR and T2 and axial diffusion weighted scanning was performed. An axial T2* scan optimized to detect hemosiderin and calcium was performed. Coronal spin echo T1and FSE T2 scans were also performed. No contrast was administered. Comparison exam: Head CT 6/1/2022. Brain MRI 8/31/2021. Findings: Diffusion: There are no areas of restricted diffusion, no acute infarction. The T2* scan shows no acute or chronic hemorrhage or abnormal calcifications. Brain parenchyma: Numerous bilateral right greater than left cerebellar hemisphere chronic areas of infarction are noted, unchanged from previous MRI. There are several chronic areas of infarction that are linear on the right and a single lesion on the left. There is a right paracentral upper pontine chronic lacunar small vessel infarction that is new from the previous MRI, centrally fluid signal with some peripheral high signal FLAIR. Very low level ischemic changes parieto-occipital periventricular and deep white matter. No new focal supratentorial lesion, mass or mass effect. Ventricles and sulci: Top normal lateral ventricles particularly frontal horns. Mild prominence of sulci in the perisylvian region. No change from previous. Extra axial: No extra axial fluid collection or mass.  Brain vasculature: Normal, no arterial vascular abnormality is noted. Craniocervical Junction: Normal. Extracranial and skull base:  Severe proptosis by imaging criteria likely from lipomatosis and shallow orbits, unchanged from previous. 1. No acute hemorrhage or acute infarction. 2. Numerous bilateral chronic right greater than left cerebellar hemisphere infarctions unchanged from previous. 3. Chronic right paramedian upper pontine lacunar small vessel infarction, new from previous MRI. 4. Mild atrophy and ischemic white matter changes. CT HEAD WO CONT    Result Date: 6/1/2022  EXAM: CT head INDICATION: Confusion. COMPARISON: 8/29/2021 TECHNIQUE: Axial CT imaging of the head was performed without intravenous contrast. Standard multiplanar coronal and sagittal reformatted images were obtained and are included in interpretation. One or more dose reduction techniques were used on this CT: automated exposure control, adjustment of the mAs and/or kVp according to patient size, and iterative reconstruction techniques. The specific techniques used on this CT exam have been documented in the patient's electronic medical record. Digital Imaging and Communications in Medicine (DICOM) format image data are available to nonaffiliated external healthcare facilities or entities on a secure, media free, reciprocally searchable basis with patient authorization for at least a 12-month period after this study. _______________ FINDINGS: BRAIN AND POSTERIOR FOSSA: Right central arden hypodensity, new from prior study. No evidence of acute large vessel transcortical infarct or acute parenchymal hemorrhage. No midline shift or hydrocephalus. EXTRA-AXIAL SPACES AND MENINGES: There are no abnormal extra-axial fluid collections. CALVARIUM: Intact. SINUSES: Clear. OTHER: None. _______________     Right central arden hypodensity, new from prior study, age indeterminate infarct. Correlate clinically.      CT ABD PELV WO CONT    Result Date: 5/16/2022  EXAM: CT of the abdomen and pelvis CLINICAL INDICATION/HISTORY: LLQ abd pain, constipation and anal pain   > Additional: None. COMPARISON: 09/01/2021   > Reference Exam: None. TECHNIQUE: Axial CT imaging of the abdomen and pelvis was performed without intravenous contrast. Oral contrast not administered. Multiplanar reformats were generated. Dose reduction techniques used: automated exposure control, adjustment of the mAs and/or kVp according to patient size, and iterative reconstruction techniques. Digital Imaging and Communications in Medicine (DICOM) format image data are available to nonaffiliated external healthcare facilities or entities on a secure, media free, reciprocally searchable basis with patient authorization for at least a 12-month period after this study. _______________ FINDINGS: LOWER CHEST: Lung bases are clear. Heart is mildly enlarged without pericardial effusion. LIVER, BILIARY: Liver is unremarkable. No biliary dilation. Gallbladder is markedly contracted but otherwise unremarkable. PANCREAS: Unremarkable. SPLEEN: Unremarkable. ADRENALS: Unremarkable. KIDNEYS/URETERS/BLADDER: No hydronephrosis. No calculi. LYMPH NODES: No enlarged lymph nodes. GASTROINTESTINAL TRACT: No bowel dilation or wall thickening. Colonic diverticulosis. PELVIC ORGANS: Unremarkable. VASCULATURE: Moderate to marked atherosclerosis. BONES: No acute or aggressive osseous abnormalities identified. OTHER: No ascites. _______________     1. No acute intra-abdominal abnormality. Colonic diverticulosis without acute diverticulitis. * **  *    XR CHEST PORT    Result Date: 6/1/2022  EXAM: XR CHEST PORT CLINICAL INDICATION/HISTORY: Encephalopathy -Additional: None COMPARISON: 1/16/2022 TECHNIQUE: Portable frontal view of the chest _______________ FINDINGS: SUPPORT DEVICES: Interval placement of a right IJV vessels catheter with tip at the cavoatrial junction. HEART AND MEDIASTINUM: Cardiomegaly. LUNGS AND PLEURAL SPACES: Bilateral parenchymal/interstitial haziness.  No large effusion or pneumothorax. _______________     Cardiomegaly with pulmonary edema. IR INSERT NON TUNL CVC OVER 5 YRS    Result Date: 6/1/2022  PROCEDURE: NON-TUNNELED DIALYSIS CATHETER PLACEMENT ATTENDING: Maame Day M.D. CONTRAST: None COMPLICATIONS: None MEDICATIONS: Local lidocaine. INDICATION: Renal failure requiring hemodialysis. PROCEDURE: Informed consent was obtained where the risks, benefits and alternatives to the procedure were explained. All elements of maximal sterile barrier technique followed including: cap and mask and sterile gown and sterile gloves and a large sterile sheet and hand hygiene and 2% chlorhexidine for cutaneous antisepsis (or acceptable alternative antiseptics, per current guidelines). Sterile ultrasound gel and a sterile cover for the US probe was used. After successfully identifying a patent right internal jugular vein, real-time ultrasound guidance demonstrated patent right internal jugular vein with normal waveforms. Then, real-time ultrasound guidance was used to puncture the right internal jugular vein. Permanent recording was created for the patient's record. Using this access, a 12 Fr dialysis catheter was placed under fluoroscopic guidance with its tip at the caval atrial junction. The catheter was secured to the skin with sutures, the ports heparinized, and a sterile dressing applied to the area. The catheter may be used immediately. The patient tolerated the procedure well and was transferred from the IR suite in stable condition. Fluoroscopic dose (reference air kerma): 9 mGy     Non-tunneled dialysis catheter placement performed, as discussed above.        Anusha Saravia MD

## 2022-06-03 NOTE — PROGRESS NOTES
Patient:  Hany Leggett  :  1970  Gender:  male  MRN #:  [id number]    Assessment:   Hany Leggett is a 54 y.o. male with h/o ESRD on HD , non compliant with dialysis /skip multiple dialysis session every month despite knowing the risk, was brought due to change in mental status   Admitted for hyperkalemia , hypoglycemia and stroke work up. No stoke per neurology . Hemodynamically stable      # ESRD  # Uncontrolled Hypertension- Improved  # Hyperphosphate   # hyperkalemia - Improved   # Secondary Hyperparathyroidism     Plan:    Patient examined and labs reviewed this morning. Dialysis today after thrombectomy   - Thrombosed left UE AVG, vascular surgery assistance appreciated   - next dialysis on Monday    - Dose all meds for ESRD    BMD- has secondary Hyperparathyroidism  Continue  calcitriol 1 mcg MWF  sevelamer for phos binders       Uncontrolled Hypertension ; Improved/at target range   Continue amlodipine 5 mg and coreg 12.5 mg BID   Will stop cardura and hydralazine   Still has residual renal function continue lasix     Anemia of CKD- Retacrit during HD     Subjective/Interval Events    Says he feels good   Denied chest pain and SOB   No other symptoms       Blood pressure (!) 131/57, pulse 89, temperature 98.7 °F (37.1 °C), resp. rate 18, height 5' 7\" (1.702 m), weight 77.6 kg (171 lb), SpO2 99 %. Exam:      Lung: clear to auscultation  Abdomen: soft, non tender  Ext: no edema in LLE  RT BKA status   CNS- Oriented to time , place and person  LUE- AVG does not have thrill and bruit      [unfilled]   Recent Labs     22  0419   WBC 5.2   HCT 23.1*   MCV 78.0     Recent Labs     22  0419      CO2 24   BUN 70*     Recent Labs     22  1055   AGRAT 0.7*       Medical Decision Making :    I have reviewed patient's record for pertinent labs, radiology and other test . I have reviewed old records.  I have ordered labs, medications and radiology as necessary and indicated per patient's condition . Total time spent is approximately 35 minutes. Greater than 50 % of that time was spent in counseling and or care coordination.      Radha Jordan MD  Nephrology -[place] [phone]

## 2022-06-03 NOTE — PROGRESS NOTES
Pt is a 54year old gentleman with a history of ESRD on hemodialysis, currently inpatient for hyperkalemia. In the ED on 6/1/22 his AVG was found to be thrombosed and a temporary HD catheter was placed by IR. Proceed with LUE AVG thrombectomy today. If this is not successful, will exchange his temporary catheter to a tunneled line. The risks, benefits, and goals of hemodialysis angiography, including, but not limited to, bleeding or vessel injury requiring surgical repair, access thrombosis, limb ischemia, pulmonary embolism, graft infection, the potential need for placement of a tunneled or temporary dialysis catheter, and the attendant risks associated with percutaneous intervention such as mechanical device malfunction and the potential need for post-procedural anticoagulation therapy, were discussed in detail. All questions were answered, and consent to proceed was confirmed. Pt has been NPO for procedure. Labs are within normal limits for a dialysis pt. Victoria Chan NP, pager [phone]  [facility]    Addendum:  I agree with above. 55 yo m brach-ax graft. Recent thrombectomy at [facility] within past few weeks. Last HD last Friday. Graft was unable to be accessed on Monday. Will attempt graft thrombectomy. Benefits, alternatives, and risks reviewed. Pt voiced understanding and agrees to proceed with intervention. If unable to clear graft, will exchange temporary HD catheter for tunneled dialysis catheter.   Dorma Aschoff, MD

## 2022-06-03 NOTE — ACP (ADVANCE CARE PLANNING)
Advance Care Planning   Advance Care Planning Inpatient Note  [facility] Department    Today's Date: 6/3/2022  Unit: THE [facility] INTENSIVE CARE    Received request from rounding. Upon review of chart and communication with care team, patient's decision making abilities are not in question. Patient was alone in the room during visit. Goals of ACP Conversation:  Discuss Advance Care planning documents    Health Care Decision Makers:      Primary Decision Maker: Home Yu - Brother - [phone]    Secondary Decision Maker: Waltjunior Penelope - Ex-Spouse      Summary:  Completed New Documents    Advance Care Planning Documents (Patient Wishes) on file:  Healthcare Power of /Advance Directive appointment of Health care agent  Living Will/ Advance Directive  Anatomical Gift/Organ donation     Assessment:    Patient was waiting to go for a procedure. He was sure he wanted his brother to be his primary. \"He checks on me every day. \"  Patient listed his ex-wife second but does not remember her phone number. Patient stated that friends check on him regularly. A friend heard him slurring his words and called his brother to check on him. Brother found him on the floor and got him here. Patient feels well supported and looking forward to eating after the procedure.      Interventions:  Assisted in the completion of documents according to patient's wishes at this time  Encouraged ongoing ACP conversation with future decision makers and loved ones    Care Preferences Communicated:  No    Outcomes/Plan:  ACP Discussion Completed  New Advance Directive completed  Returned original document(s) to patient, as well as copies for distribution to appointed agents  Copy of Advance Directive given to staff to scan into medical record    Lola NewberryPocahontas Memorial Hospital on 6/3/2022 at 10:31 AM

## 2022-06-03 NOTE — PROGRESS NOTES
2000 pt taken over awake alert oriented x4 , denies pain at present. Dressing to dialysis cath in rt IJ intact. Pt voids via urinal minimal amounts. Assessment carried out and documented in relevant flow sheets . Nursing management continues. @0000 pt reassessed no new developments. Pt reminded that he is now NPO for procedure tomorrow all food and drink removed from bedside. Pt continues to be prepared for care continues. @4770 all abdominal dressings and PICC line dressings changed per protocol care continues. @8401 Bedside and Verbal shift change report given to Cornelia Puentes (oncoming nurse) by Kailey Grande (offgoing nurse). Report included the following information SBAR, Kardex, Intake/Output, MAR, Recent Results, Med Rec Status and Alarm Parameters .

## 2022-06-03 NOTE — PROGRESS NOTES
TRANSFER - OUT REPORT:    Verbal report given to Romulo Fields RN (name) on Julita Vaughan  being transferred to ICU (unit) for routine progression of care   At bedside. Report consisted of patients Situation, Background, Assessment and   Recommendations(SBAR). Information from the following report(s) SBAR, Procedure Summary, MAR and Cardiac Rhythm NSR was reviewed with the receiving nurse. Lines:   Peripheral IV 15/16/24 Right Basilic (Active)   Site Assessment Clean, dry, & intact 06/03/22 0738   Phlebitis Assessment 1 06/03/22 0400   Infiltration Assessment 0 06/03/22 0400   Dressing Status Clean, dry, & intact 06/03/22 0738   Dressing Type Transparent;Tape;Disk with Chlorhexadine gluconate (CHG) 06/03/22 0400   Hub Color/Line Status Flushed 06/03/22 5892   Action Taken Open ports on tubing capped 06/03/22 0400   Alcohol Cap Used Yes 06/03/22 0400        Opportunity for questions and clarification was provided.       Patient transported with:   Monitor  Registered Nurse  Eliza Mcfadden RN

## 2022-06-03 NOTE — PROGRESS NOTES
Pulmonary Specialists  Pulmonary, Critical Care, and Sleep Medicine    Name: Katy Adler MRN: [id number]   : 1970 Hospital: [facility] [place]    Date: 6/3/2022  Room: [facility]/[address] [place] [address] Note                                              Consult requesting physician: Dr. Marlene Terry  Reason for Consult: assisting with ICU care for metabolic encephalopathy, hypoglycemia    IMPRESSION:   · Pulmonary edema - J81.1  · Elevated HTN - I10  · Encephalopathy, 2' to below, resolved - G93.40  · Hypoglycemia, resolved - E16.2  · RT central pontine density - no new CVA on MRI brain - G93.9  · Hyperkalemia, improved - E82.5  Patient Active Problem List   Diagnosis Code    Iron deficiency anemia D50.9    Other restrictive cardiomyopathy ([address] I42.5    ESRD (end stage renal disease) on dialysis ([address] N18.6, Z99.2    HTN (hypertension) I10    Hx of BKA, right ([address] Z89.511    Hypoglycemia E16.2    Hyperkalemia E87.5    Uremia N19    Non-compliance with renal dialysis ([address] Z91.15    Toxic metabolic encephalopathy R00.2    Hypertension I10    AV fistula occlusion ([address] T82.898A     · Code status: Full Code       RECOMMENDATIONS:     Respiratory: Compensated respiratory status. On room air. No respiratory distress  Chest x-ray 22 and 22 showed pulmonary edema  Reported dyspnea off and on likely related to chronic pulmonary edema in the view of inability to get HD with access malfunction  Keep SPO2 >=92%. HOB 30 degree elevation all the time. Aggressive pulmonary toileting. Aspiration precautions. Incentive spirometry educated and encouraged.     CVS: Improvement of blood pressure after adjustment in medications dose  Initially presented with elevated blood pressure and was gradually lowered  Developed low blood pressure on home dose medications yesterday concerning for adherence and required to lower the dosage  Denies any symptoms associated with elevated blood pressure  ECHO 2022   Left Ventricle: Normal left ventricular systolic function with a visually estimated EF of 60 - 65%. Left ventricle size is normal. Moderately increased wall thickness. Findings consistent with moderate concentric hypertrophy. Normal wall motion. Grade I diastolic dysfunction with normal LAP.   Left Atrium: Left atrium is mildly dilated.   Mitral Valve: Moderate annular calcification at the posterior leaflet of the mitral valve. Trace regurgitation. No stenosis noted.   Tricuspid Valve : Inadequate tricuspid regurgitant jejt to comment on estimated PASP.   Interatrial Septum: No interatrial shunt visualized with color Doppler. Grade 0 Absence of bubbles. Agitated saline study was negative with and without provocation. ID: no clinical evidence for sepsis or pneumonia  No fever or leukocytosis  Monitor off of antibiotic    Hematology/Oncology: Chronic anemia; stable Hgb and platelet-monitor daily CBC  No clinical evidence for bleeding anywhere    Renal: Possible vascular procedure today for AV fistula occlusion  As per vascular surgery request, HD after the procedure  Patient may have uremia contributed to anorexia and related hypoglycemia  IR guided temporary dialysis catheter placement followed by hemodialysis on 6/1/22  Nephrology following. Further HD arrangements per nephrology  Correct electrolytes as needed with HD per nephrology  Hyperkalemia resolved with hemodialysis 6/1/22    GI/: Anorexia and chronic diarrhea PTA  Improved appetite per patient  No evidence of acute abdomen on exam  PPI for GI prophylaxis    Endocrine: Monitor serial FSBS. No further hypoglycemia episode in ICU    Neurology: Abnormal CT head on admission with right central pontine hypodensity  MRI brain 6/1/2022 no new stroke  Neurology evaluating patient  US carotid 6/2/22  Bilateral less than 50% stenosis of the internal carotid arteries. No significant stenosis in the external carotid arteries bilaterally.   Antegrade flow in both vertebral arteries. Normal flow in both subclavian arteries    Pain/Sedation: Denies any chronic pain condition. Avoid opiate, sedative, hypnotic if possible    Skin/Wound: As per ICU nursing care    Electrolytes: Replace electrolytes per HD protocol as per nephrology    Nutrition: Diet as tolerated. N.p.o. for procedure today    Prophylaxis: DVT Prophylaxis: SCD/heparin. GI Prophylaxis: Protonix. Restraints: none    Lines/Tubes: PIV  Temporary HD access 6/1/2022 (site examined, no erythema, induration, discharge or sign of infection. Dressing intact. Medically necessary, will remove electrolytes with when not needed. Central line bundle followed). PT/OT eval and treat, OOB when more stable    Advance Directive/Palliative Care: Full code. Consult as per clinical course. Prognosis guarded  Will defer respective systems problem management to primary and other respective consultant and follow patient in ICU with primary and other medical team.  Further recommendations will be based on the patient's response to recommended treatment and results of the investigation ordered. Quality Care: PPI, DVT prophylaxis, HOB elevated, Infection control all reviewed and addressed. Care of plan d/w RN, hospitalist  D/w patient (answered all questions to satisfaction). [place] [facility] [place] hospitalist. Will sign off once out of ICU    High complexity decision making was performed during the evaluation of this patient at high risk for decompensation with multiple organ involvement. Total critical care time spent rendering care exclusive of procedures/family discussion/coordination of care: 36 minutes. Subjective/History of Present Illness:   Patient is a 54 y.o. male with PMHx significant for ESRD on HD [M-W-F], DM, CHF, hypertension, sickle cell trait, anemia, PVD status post right BKA was brought by EMS to [facility] ER for complaint of generalized weakness, confusion, hypoglycemia. On arrival to ER, FSBS was 46 and responded to medical intervention with improvement in patient's mental status. He reports generalized weakness started about 2 days ago PTA associated with somnolence. Today he woke up feeling more generalized weak. His friend over the phone reported him speaking slurred and alerted his brother. While trying to get out of home he could not remain standing and fell on floor before he could come to [facility] for his appointment regarding AV fistula malfunction. When his brother arrived at his place found him on the floor and called EMS. He has chronic diarrhea that has worsened over the last 1 month and reports weight loss. Denies HA, neck stiffness, photophobia, sore throat, sinus pain or discharge, cough, phlegm, dysuria, nausea, vomiting, rash, adenopathy, leg swelling or calf tenderness, urethral discharge. Has off-and-on dyspnea on exertion while denies cough, chest pain, wheezing or hemoptysis, palpitations, syncope, orthopnea, worsening of chronic right leg edema or paroxysmal nocturnal dyspnea. In ER, found to have elevated K+, S. Cr while low Hgb, blood glucose. Patient reports COVID vaccination with booster recently. Nephrology has been consulted to arrange HD. CT head showed abnormal density in the right central pontine area and nephrology has been consulted. Patient seen at bedside in ER room #3      06/03/22:   Patient seen at bedside in ICU room #1  Developed iatrogenic low blood pressure from home blood pressure medications  Blood pressure medications doses adjusted lower yesterday with improvement in BP control  The patient denies any symptoms of new neurological impairment or TIA's; no HA, neck pain or stiffness, photophobia, amaurosis, diplopia, dysphasia, or unilateral disturbance of motor or sensory function, speech disturbance or any chest pain, dyspnea, lightheadedness, palpitations, syncope, orthopnea, paroxysmal nocturnal dyspnea  On room air.   Able to converse over the phone and with me without any difficulty  The patient denies cough, chest pain, dyspnea, wheezing or hemoptysis  Awaiting vascular procedure today. N.p.o. for procedure  He denies any nausea, vomiting, dysphagia, abdominal pain  No further episode of hypoglycemia  Tolerated p.o. diet  [facility] was not contacted by staff on anything about patient overnight. I/O last 24 hrs: No intake or output data in the 24 hours ending 06/03/22 1335    History taken from patient, EMR    Review of Systems:  A comprehensive review of systems was negative except for that written in the HPI. Allergies   Allergen Reactions    Penicillins Rash    Vancomycin Other (comments)     kayla syndrome      Past Medical History:   Diagnosis Date    Chronic kidney disease     Diabetes [facility]     Heart failure ([facility]     Hypertension     Sickle cell trait ([facility]       Past Surgical History:   Procedure Laterality Date    HX ORTHOPAEDIC      right BKA 2017    IR INSERT NON TUNL CVC OVER 5 YRS  6/1/2022      Social History     Tobacco Use    Smoking status: Former Smoker    Smokeless tobacco: Never Used   Substance Use Topics    Alcohol use: No      No family history on file. Prior to Admission medications    Medication Sig Start Date End Date Taking? Authorizing Provider   aspirin delayed-release 81 mg tablet Take 1 Tablet by mouth daily. 9/7/21   Mercy Buchanan, DO   doxazosin (CARDURA) 4 mg tablet Take 2 mg by mouth daily. Provider, Historical   sodium hypochlorite 0.0125% (DAKINS SOLUTION) Apply 1 mL to affected area two (2) times a week. Provider, Historical   omeprazole (PRILOSEC) 40 mg capsule Take 40 mg by mouth daily. Provider, Historical   sevelamer carbonate (RENVELA) 800 mg tab tab Take 800 mg by mouth three (3) times daily (with meals). Provider, Historical   sodium bicarbonate 650 mg tablet Take 650 mg by mouth three (3) times daily.     Provider, Historical   amLODIPine (NORVASC) 10 mg tablet Take 1 Tab by mouth daily. 3/8/18   Lonnie Busby MD   carvedilol (COREG) 25 mg tablet Take 1 Tab by mouth two (2) times daily (with meals). 3/7/18   Lonnie Busby MD   hydrALAZINE (APRESOLINE) 25 mg tablet Take 1 Tab by mouth three (3) times daily. Patient taking differently: Take 50 mg by mouth two (2) times a day. 3/7/18   Lonnie Busby MD   isosorbide mononitrate ER (IMDUR) 60 mg CR tablet Take 1 Tab by mouth daily. 3/8/18   Lonnie Busby MD   furosemide (LASIX) 80 mg tablet Take 1 Tab by mouth two (2) times a day. 3/7/18   Lonnie Busby MD     Current Facility-Administered Medications   Medication Dose Route Frequency    [START ON 2022] epoetin delonte-epbx (RETACRIT) injection 20,000 Units  20,000 Units SubCUTAneous DIALYSIS MON, WED & FRI    amLODIPine (NORVASC) tablet 5 mg  5 mg Oral DAILY    carvediloL (COREG) tablet 12.5 mg  12.5 mg Oral BID WITH MEALS    sodium chloride (NS) flush 5-40 mL  5-40 mL IntraVENous Q8H    heparin (porcine) injection 5,000 Units  5,000 Units SubCUTAneous Q8H    pantoprazole (PROTONIX) tablet 40 mg  40 mg Oral ACB    aspirin delayed-release tablet 81 mg  81 mg Oral DAILY    isosorbide mononitrate ER (IMDUR) tablet 60 mg  60 mg Oral DAILY    sevelamer carbonate (RENVELA) tab 800 mg  800 mg Oral TID WITH MEALS    furosemide (LASIX) tablet 80 mg  80 mg Oral BID         Objective:   Vital Signs:    Visit Vitals  BP (!) 129/57   Pulse 84   Temp 97.9 °F (36.6 °C)   Resp 17   Ht 5' 7\" (1.702 m)   Wt 77.6 kg (171 lb)   SpO2 96%   BMI 26.78 kg/m²       O2 Device: None (Room air)       Temp (24hrs), Av.5 °F (36.9 °C), Min:97.9 °F (36.6 °C), Max:98.7 °F (37.1 °C)       Intake/Output:   Last shift:      No intake/output data recorded. Last 3 shifts: No intake/output data recorded.     No intake or output data in the 24 hours ending 22 1335    Last 3 Recorded Weights in this Encounter    22 1700 22 1126   Weight: 80.9 kg (178 lb 5.6 oz) 77.8 kg (171 lb 8.3 oz) 77.6 kg (171 lb)         No results for input(s): PHI, PHI, POC2, PCO2I, PO2, PO2I, HCO3, HCO3I, FIO2, FIO2I in the last 72 hours. Physical Exam:   General/Neurology: Alert, Awake, O x 4, nonfocal limited neurological exam, NAD. On room air  Head:   Normocephalic, without obvious abnormality, atraumatic. Eye:   EOM intact. No scleral icterus  Nose:   No sinus tenderness  Throat:  Visible lips, mucosa, and tongue normal. No oral thrush. Neck:   Supple, symmetric. No lymphadenopathy. Trachea midline  Lung: Moderate air entry bilateral equal. Few scattered bilateral stable rales. No stridors. No prolongded expiration. No accessory muscle use. Heart:   Regular rate & rhythm. S1 S2 present. No murmur. No JVD. Abdomen:  Soft. NT. ND. +BS. No masses. No guarding, rigidity or rebound  Extremities:  Trace left leg stable pedal edema. No cyanosis. Right BKA  Pulses: 2+ and symmetric in DP. Capillary refill: normal upper extremity bilateral and left lower extremity  Lymphatic:  No cervical or supraclavicular palpable lymphadenopathy. Musculoskeletal: No joint swelling. No tenderness.   Skin:   Color, texture, turgor fair      Data:       Recent Results (from the past 24 hour(s))   GLUCOSE, POC    Collection Time: 06/02/22  5:14 PM   Result Value Ref Range    Glucose (POC) 111 (H) 70 - 110 mg/dL   GLUCOSE, POC    Collection Time: 06/02/22  8:40 PM   Result Value Ref Range    Glucose (POC) 137 (H) 70 - 177 mg/dL   METABOLIC PANEL, BASIC    Collection Time: 06/03/22  4:19 AM   Result Value Ref Range    Sodium 136 136 - 145 mmol/L    Potassium 4.4 3.5 - 5.5 mmol/L    Chloride 102 100 - 111 mmol/L    CO2 24 21 - 32 mmol/L    Anion gap 10 3.0 - 18 mmol/L    Glucose 100 (H) 74 - 99 mg/dL    BUN 70 (H) 7.0 - 18 MG/DL    Creatinine 10.50 (H) 0.6 - 1.3 MG/DL    BUN/Creatinine ratio 7 (L) 12 - 20      GFR est AA 6 (L) >60 ml/min/1.73m2    GFR est non-AA 5 (L) >60 ml/min/1.73m2    Calcium 7.4 (L) 8.5 - 10.1 MG/DL   CBC WITH AUTOMATED DIFF    Collection Time: 06/03/22  4:19 AM   Result Value Ref Range    WBC 5.2 4.6 - 13.2 K/uL    RBC 2.96 (L) 4.35 - 5.65 M/uL    HGB 7.8 (L) 13.0 - 16.0 g/dL    HCT 23.1 (L) 36.0 - 48.0 %    MCV 78.0 78.0 - 100.0 FL    MCH 26.4 24.0 - 34.0 PG    MCHC 33.8 31.0 - 37.0 g/dL    RDW 18.5 (H) 11.6 - 14.5 %    PLATELET 386 580 - 883 K/uL    MPV 9.7 9.2 - 11.8 FL    NRBC 0.0 0  WBC    ABSOLUTE NRBC 0.00 0.00 - 0.01 K/uL    NEUTROPHILS 70 40 - 73 %    LYMPHOCYTES 11 (L) 21 - 52 %    MONOCYTES 14 (H) 3 - 10 %    EOSINOPHILS 4 0 - 5 %    BASOPHILS 1 0 - 2 %    IMMATURE GRANULOCYTES 0 0.0 - 0.5 %    ABS. NEUTROPHILS 3.6 1.8 - 8.0 K/UL    ABS. LYMPHOCYTES 0.6 (L) 0.9 - 3.6 K/UL    ABS. MONOCYTES 0.7 0.05 - 1.2 K/UL    ABS. EOSINOPHILS 0.2 0.0 - 0.4 K/UL    ABS. BASOPHILS 0.0 0.0 - 0.1 K/UL    ABS. IMM.  GRANS. 0.0 0.00 - 0.04 K/UL    DF AUTOMATED     MAGNESIUM    Collection Time: 06/03/22  4:19 AM   Result Value Ref Range    Magnesium 2.9 (H) 1.6 - 2.6 mg/dL   PHOSPHORUS    Collection Time: 06/03/22  4:19 AM   Result Value Ref Range    Phosphorus 8.1 (H) 2.5 - 4.9 MG/DL   GLUCOSE, POC    Collection Time: 06/03/22  8:06 AM   Result Value Ref Range    Glucose (POC) 87 70 - 110 mg/dL   GLUCOSE, POC    Collection Time: 06/03/22 11:44 AM   Result Value Ref Range    Glucose (POC) 87 70 - 110 mg/dL         Chemistry Recent Labs     06/03/22  0419 06/02/22  0151 06/01/22  1336 06/01/22  1055 06/01/22  1055   * 101* 81   < > 56*    135* 137   < > 137   K 4.4 4.5 6.3*   < > 6.2*    101 106   < > 104   CO2 24 23 13*   < > 14*   BUN 70* 54* 141*   < > 135*   CREA 10.50* 8.20* 17.60*   < > 17.00*   CA 7.4* 7.5* 7.7*   < > 7.6*   MG 2.9* 2.5  --   --  3.5*   PHOS 8.1* 5.8*  --   --   --    AGAP 10 11 18   < > 19*   BUCR 7* 7* 8*   < > 8*   AP  --   --   --   --  86   TP  --   --   --   --  7.5   ALB  --   --   --   --  3.0*   GLOB  --   --   --   --  4.5*   AGRAT  --   --   --   --  0.7*    < > = values in this interval not displayed. Lactic Acid Lactic acid   Date Value Ref Range Status   08/27/2021 1.1 0.4 - 2.0 MMOL/L Final     No results for input(s): LAC in the last 72 hours. Liver Enzymes Protein, total   Date Value Ref Range Status   06/01/2022 7.5 6.4 - 8.2 g/dL Final     Albumin   Date Value Ref Range Status   06/01/2022 3.0 (L) 3.4 - 5.0 g/dL Final     Globulin   Date Value Ref Range Status   06/01/2022 4.5 (H) 2.0 - 4.0 g/dL Final     A-G Ratio   Date Value Ref Range Status   06/01/2022 0.7 (L) 0.8 - 1.7   Final     Alk. phosphatase   Date Value Ref Range Status   06/01/2022 86 45 - 117 U/L Final     Recent Labs     06/01/22  1055   TP 7.5   ALB 3.0*   GLOB 4.5*   AGRAT 0.7*   AP 86        CBC w/Diff Recent Labs     06/03/22  0419 06/02/22  0513 06/01/22  1055   WBC 5.2 4.7 6.1   RBC 2.96* 2.98* 3.01*   HGB 7.8* 8.0* 7.9*   HCT 23.1* 23.2* 23.6*    217 253   GRANS 70 71 83*   LYMPH 11* 10* 5*   EOS 4 4 2        Cardiac Enzymes No results found for: CPK, CK, CKMMB, CKMB, RCK3, CKMBT, CKNDX, CKND1, WILLIE, TROPT, TROIQ, CHANDA, TROPT, TNIPOC, BNP, BNPP     BNP No results found for: BNP, BNPP, XBNPT     Coagulation No results for input(s): PTP, INR, APTT, INREXT, INREXT in the last 72 hours.       Thyroid  Lab Results   Component Value Date/Time    TSH 2.38 08/29/2021 01:20 AM       No results found for: T4     Urinalysis Lab Results   Component Value Date/Time    Color YELLOW 03/02/2018 05:41 AM    Appearance CLEAR 03/02/2018 05:41 AM    Specific gravity 1.010 03/02/2018 05:41 AM    pH (UA) 5.0 03/02/2018 05:41 AM    Protein 300 (A) 03/02/2018 05:41 AM    Glucose NEGATIVE  03/02/2018 05:41 AM    Ketone NEGATIVE  03/02/2018 05:41 AM    Bilirubin NEGATIVE  03/02/2018 05:41 AM    Urobilinogen 0.2 03/02/2018 05:41 AM    Nitrites NEGATIVE  03/02/2018 05:41 AM    Leukocyte Esterase NEGATIVE  03/02/2018 05:41 AM    Epithelial cells FEW 03/02/2018 05:41 AM    Bacteria FEW (A) 03/02/2018 05:41 AM    WBC 0 to 2 03/02/2018 05:41 AM    RBC NEGATIVE  03/02/2018 05:41 AM        Micro  No results for input(s): SDES, CULT in the last 72 hours. No results for input(s): CULT in the last 72 hours. Culture data during this hospitalization. All Micro Results     None             CT ABD PELV WO CONT    Result Date: 5/16/2022  EXAM: CT of the abdomen and pelvis CLINICAL INDICATION/HISTORY: LLQ abd pain, constipation and anal pain   > Additional: None. COMPARISON: 09/01/2021   > Reference Exam: None. TECHNIQUE: Axial CT imaging of the abdomen and pelvis was performed without intravenous contrast. Oral contrast not administered. Multiplanar reformats were generated. Dose reduction techniques used: automated exposure control, adjustment of the mAs and/or kVp according to patient size, and iterative reconstruction techniques. Digital Imaging and Communications in Medicine (DICOM) format image data are available to nonaffiliated external healthcare facilities or entities on a secure, media free, reciprocally searchable basis with patient authorization for at least a 12-month period after this study. _______________ FINDINGS: LOWER CHEST: Lung bases are clear. Heart is mildly enlarged without pericardial effusion. LIVER, BILIARY: Liver is unremarkable. No biliary dilation. Gallbladder is markedly contracted but otherwise unremarkable. PANCREAS: Unremarkable. SPLEEN: Unremarkable. ADRENALS: Unremarkable. KIDNEYS/URETERS/BLADDER: No hydronephrosis. No calculi. LYMPH NODES: No enlarged lymph nodes. GASTROINTESTINAL TRACT: No bowel dilation or wall thickening. Colonic diverticulosis. PELVIC ORGANS: Unremarkable. VASCULATURE: Moderate to marked atherosclerosis. BONES: No acute or aggressive osseous abnormalities identified. OTHER: No ascites. _______________     1. No acute intra-abdominal abnormality. Colonic diverticulosis without acute diverticulitis.  * **  *        Images report reviewed by me:    CXR reviewed by me:  XR (Most Recent). CXR  reviewed by me and compared with previous CXR Results from Hospital Encounter encounter on 06/01/22    XR CHEST PORT    Narrative  EXAM: XR CHEST PORT    CLINICAL INDICATION/HISTORY: Encephalopathy  -Additional: None    COMPARISON: 1/16/2022    TECHNIQUE: Portable frontal view of the chest    _______________    FINDINGS:    SUPPORT DEVICES: Interval placement of a right IJV vessels catheter with tip at  the cavoatrial junction. HEART AND MEDIASTINUM: Cardiomegaly. LUNGS AND PLEURAL SPACES: Bilateral parenchymal/interstitial haziness. No large  effusion or pneumothorax.    _______________    Impression  Cardiomegaly with pulmonary edema. ·Please note: Voice-recognition software may have been used to generate this report, which may have resulted in some phonetic-based errors in grammar and contents. Even though attempts were made to correct all the mistakes, some may have been missed, and remained in the body of the document.       Dana Mora MD  6/3/2022

## 2022-06-03 NOTE — PROGRESS NOTES
Chart reviewed, plan for today consist of thrombectomy procedure today, d/c plan consist of pt returning back home independent as prior and resuming dialysis services at [facility] M-W-F, at this time pt not medically cleared for weekend discharge, on call cm will be available thru [facility]  for immediate needs.

## 2022-06-03 NOTE — PROGRESS NOTES
TREATMENT SUMMARY     Patient in room  108 dialyzed for 3.5  hours. Tolerated tx well with without complaint or complications. Right TDC functioning without complication.     2000 ml UF removed with a net UF removal of 1500 ml. Report given to SANDRA Canales RN with all questions answered. TREATMENT NOTES       Received report from Julia Swift, [facility] at [phone]. TX was initiated while patient was in bed without difficulty. Aseptically, the CVC ports were accesed and flushed without problem. Treatment was started without reversing the lines. Will continue to monitor patients closely during treatment. ACUTE HEMODIALYSIS FLOW SHEET       PATIENT INFORMATION   [address] [facility]       DR. Alston    []1st Time Acute  []Stat[x] Routine []Urgent []Chronic Unit   []Acute Room []Bedside  [x]ICU/CCU []ER   Isolation Precautions: [x]Dialysis[] Airborne []Contact []Droplet []Reverse    Special Considerations:_______  [] Blood Consent Verified  []N/A   Allergies:[] NKA                 [x] __Penicillins, vancomycin__ Code Status [x]Full Code [] DNR  [] Other_____   Diet: [] Renal [] NPO [x] Diabetic   [] Enteral Feeding [] Cardiac Diabetic: [x]Yes []No     [x]Signed Treatment Consent Verified   [x] Time Out/ Safety Check: 1900   PRIMARY NURSE REPORT: FIRST INITIAL/ LAST NAME/TITLE  PRE DIALYSIS:   José Zurita RN                                        [id number]   ACCESS   CATHETER ACCESS: [] N/A  [] RIGHT  [] LEFT  [] IJ  [x] SUBCL [] FEM                    [] First use X-ray  [x] Tunnel     [] Non-Tunneled      [] No S/S infection  [] Redness [] Drainage  [] Cultured [] Swelling [] Pain                    [] Medical Aseptic [] Prep Dressing Changed                  [] Clotted [] Patent []      Flows: [] Good [] Poor [] Reversed                 If Access Problem Dr. Kaci Glover: [] Yes [] No    Date:_____  [] N/A[] GRAFT/FISTULA ACCESS:  [] N/A  [] RIGHT  [x] LEFT  [] UE   [] LE       [] AVG  [] AVF [] BUTTONHOLE    [] +BRUIT/THRILL [] MEDICAL ASEPTIC PREP, non functional     [] No S/S infection  [] Redness [] Drainage  [] Cultured [] Swelling [] Pain              If Access Problem Dr. Shahrzad Peacock: [] Yes [] No    Date:______ [] N/A   GENERAL ASSESSMENT   LUNGS:  SaO2% ____ [x] Clear [] Coarse [] Crackles [] Wheezing               [] Diminished Location: [] RLL [] LLL [] RUL [] KYLAH    COUGH:  [] Productive  [] Loose[] Dry [] N/A  RESPIRATIONS: [] Easy [] Labored    THERAPY: [x] RA   [] NC _____. L/min    Mask: [] NRB [] Venti  _____O2%                  [] Ventilator [] Intubated [] Trach [] BiPap [] CPap [] HI Flow   CARDIAC: [x] Regular [] Irregular [] Pericardial Rub [] JVD               Monitored Rhythm:______ [] N/A   EDEMA: [x] None [] Generalized [] Facial [] Pedal [] UE [] LE             [] Pitting [] 1 [] 2 [] 3 [] 4    [] Right [] Left [] Bilateral   SKIN:    [x] Warm [] Hot [] Cold  [x] Dry [] Pale [] Diaphoretic              [] Flushed [] Jaundiced [] Cyanotic [] Rash [] Weeping     LOC:    [x] Alert  Oriented to: [x] Person [x] Place [x] Time             [] Confused [] Lethargic [] Medicated [] Non-responsive    GI/ABDOMEN: [x] Flat [] Distended [x] Soft [] Firm [] Diarrhea [x] Bowel Sounds Present [] Nausea [] Vomiting    PAIN: [x] 0 [] 1 [] 2 [] 3 [] 4 [] 5 [] 6 [] 7 [] 8 [] 9 [] 10          Scale 1-10 Action/Follow Up_____   MOBILITY: [] Amb [] Amb/Assist [x] Bed  [] Wheelchair    CURRENT LABS   HBsAg ONLY: Date Drawn: 06-1-2022            [x] Negative [] Positive [] Unknown.      HBsAb: Date Drawn: 06-             [] Susceptible <10 [x] Immune ?10 [] Unknown   Date of Current Labs:  ATTACHED     EDUCATION   Person Educated: [x] Patient [] Other_________   Knowledge base: [] None [] Minimal [x] Substantial    Barriers to learning  [x] None _______________   Preferred method of learning: [] Written [] Oral [x] Visual [] Hands on    Topic: [] Access Care [] S&S of infection [] Fluid Management   [x] K+  [] Procedural  [] Albumin [] Medications [] Tx Options   [] Transplant [] Diet [] Other    Teaching Tools: [x] Explain [] Demonstration [] Handout_____ [] Video______  CARE PLAN    [x] Renal Failure (Adult)  Interdisciplinary  · Fluid and electrolytes stabilized  ? Interventions  · Dehydration signs and symptoms (eg: Weight/lab monitoring; vomiting/diarrhea/urine; tenting; mucous membranes; dizziness/lethargy/irritability/confusion; weak pulse; tachycardia; blood pressure; I&O)  · Fluid overload signs and symptoms assessment (eg: Body weight increased; dyspnea; edema; hypertension; respiratory crackles/wheezing; JVD; lab monitoring; mental status changes; I&O)  · Monitor appropriate lab values  · COMPLIANCE WITH PRESCRIBED THERAPY  · ARTERIAL ACCESS SITE ASSESSMENT  · NUTRITION SCREENING  · Vital signs monitoring per assessed patient condition or unit standard  · Cardiac monitoring  · Hydration management  · Intake and output measurement  · Body weight monitoring  · Skin care  · DIALYSIS  · Nutrition Care Process per nutrition screen  · Oral hygiene care every 2 hours  · Pain management     · Outcome   ? [x] Progressing Towards Goal  ? [] Not Progressing Towards Goals  ? [] Goals Met/Resolved  ? [] Goals Not Met/ Resolved        · Patient/ Family Education  ? Progressing Towards Goals          RO/HEMODIAYLSIS MACHINE SAFETY CHECKS- BEFORE EACH TREATMENT          [x] [facility]: Machine Serial #1:  [id number]   RO Serial #1: [id number]                       [] [facility]: Machine Serial #2:  [id number]   RO Serial #2: [id number]        [] [facility]: Machine Serial #3:  [id number]    Serial #[id number]    Alarm Test: [x] Pass  Time_1851_  [x] RO/Machine Log Complete    [x] Extracorporeal circuit Tested for integrity           Dialyzer_[id number]_   Tubing_18c14-9    Dialysate: pH_7.2__  Temp. 36. 0_Conductivity: Meter _14.0___ HD Machine_13.9_ CHLORINE TESTING- BEFORE EACH TREATMENT AND EVERY 4 HOURS   Total Chlorine: [x] Less than 0.1 ppm Time:1845_2nd Check Time:2145______  (If greater than 0.1 ppm from Primary then every 30 minutes from Secondary)   TREATMENT INIATION-WITH DIALYSIS PRECAUTIONS   [x] All Connections Secured   [x] Saline Line Double Clamped    [x] Venous Parameters Set [x] Arterial Parameters Set    [x] Prime Given 250 ml     [x] Air Foam Detector Engaged   PRE-TREATMENT   UF Calculations: Wt to lose:1500_ml(+) Oral:__ml(+)IV Meds/Fluids/Blood prods___ml(+) Prime/Rinse_500__ml(=)Total UF Goal 2000__mL   Scale Type:[x] Bed scale [] Sling Scale [] Wheel Chair Scale []  Not Ordered [] [] Unable to obtain pt on stretcher/ bed scale malfunctioning      [x] Time Out/Safety Check  Time:_1900   INTRADIALYTIC MONITORING  (SEE ATTACHED FLOWSHEET)     POST TREATMENT    Time Medication Dose Volume Route    Initials                                           [facility] Signatures Title Initials Time                     Dialyzer cleared: [x] Good [] Fair [] Poor     Blood Processed 75___Liters    Net UF Removed _1500___mL  Post Tx Access:                  AVF/AVG: Bleeding Stop       Art.___min Chaim.____min []+bruit/thrill                Catheter: Locking Solution [x] Heparin 1 ml/1000 units  [] Normal Saline                                                                               Art._____ ml Chaim._____ml  Post Assessment:              Skin: [x]Warm []Dry []Diaphoretic []Flushed [] Pale []Cyanotic            Lungs: [x]Clear []Coarse []Crackles []Wheezing             Cardiac: [x]Regular []Irregular  []Monitored rhythm____ []N/A            Edema: [x]None []General []Facial []Pedal  []UE []LE []RIGHT []LEFT            Pain: [x]0 []1 []2 []3 []4 []5 []6 []7 []8 []9 []10   POST Tx Note: Patient in room  108 dialyzed for 3.5  hours. Tolerated tx well with without complaint or complications. Right TDC functioning without complication.     DFR 800  2000 ml UF removed with a net UF removal of 1500 ml. Report given to SANDRA Canales RN with all questions answered.          Primary Nurse Report: First initial/Last name/Title    Post Dialysis:_Danny Canales_        Time:4079   Abbreviations: AVG-arterial venous graft, AVF-arterial venous fistula, IJ-Internal Jugular,  Subcl-Subclavian, Fem-Femoral, Tx-treatment, AP/HR-apical heart rate, DFR-dialysate flow rate, BFR-blood flow rate, AP-arterial pressure, -venous pressure, UF-ultrafiltrate, TMP-transmembrane pressure, Chaim-Venous, Art-Arterial, RO-Reverse Osmosis

## 2022-06-04 LAB
ANION GAP SERPL CALC-SCNC: 4 MMOL/L (ref 3–18)
BASOPHILS # BLD: 0 K/UL (ref 0–0.1)
BASOPHILS NFR BLD: 1 % (ref 0–2)
BUN SERPL-MCNC: 30 MG/DL (ref 7–18)
BUN/CREAT SERPL: 5 (ref 12–20)
CALCIUM SERPL-MCNC: 7.8 MG/DL (ref 8.5–10.1)
CHLORIDE SERPL-SCNC: 100 MMOL/L (ref 100–111)
CO2 SERPL-SCNC: 30 MMOL/L (ref 21–32)
CREAT SERPL-MCNC: 5.56 MG/DL (ref 0.6–1.3)
DIFFERENTIAL METHOD BLD: ABNORMAL
EOSINOPHIL # BLD: 0.2 K/UL (ref 0–0.4)
EOSINOPHIL NFR BLD: 4 % (ref 0–5)
ERYTHROCYTE [DISTWIDTH] IN BLOOD BY AUTOMATED COUNT: 18 % (ref 11.6–14.5)
GLUCOSE BLD STRIP.AUTO-MCNC: 175 MG/DL (ref 70–110)
GLUCOSE BLD STRIP.AUTO-MCNC: 76 MG/DL (ref 70–110)
GLUCOSE BLD STRIP.AUTO-MCNC: 78 MG/DL (ref 70–110)
GLUCOSE BLD STRIP.AUTO-MCNC: 87 MG/DL (ref 70–110)
GLUCOSE BLD STRIP.AUTO-MCNC: 90 MG/DL (ref 70–110)
GLUCOSE SERPL-MCNC: 80 MG/DL (ref 74–99)
HCT VFR BLD AUTO: 24.8 % (ref 36–48)
HGB BLD-MCNC: 8.3 G/DL (ref 13–16)
IMM GRANULOCYTES # BLD AUTO: 0 K/UL (ref 0–0.04)
IMM GRANULOCYTES NFR BLD AUTO: 0 % (ref 0–0.5)
LYMPHOCYTES # BLD: 0.4 K/UL (ref 0.9–3.6)
LYMPHOCYTES NFR BLD: 8 % (ref 21–52)
MAGNESIUM SERPL-MCNC: 2.3 MG/DL (ref 1.6–2.6)
MCH RBC QN AUTO: 26.2 PG (ref 24–34)
MCHC RBC AUTO-ENTMCNC: 33.5 G/DL (ref 31–37)
MCV RBC AUTO: 78.2 FL (ref 78–100)
MONOCYTES # BLD: 0.7 K/UL (ref 0.05–1.2)
MONOCYTES NFR BLD: 12 % (ref 3–10)
NEUTS SEG # BLD: 4.1 K/UL (ref 1.8–8)
NEUTS SEG NFR BLD: 76 % (ref 40–73)
NRBC # BLD: 0 K/UL (ref 0–0.01)
NRBC BLD-RTO: 0 PER 100 WBC
PHOSPHATE SERPL-MCNC: 4.8 MG/DL (ref 2.5–4.9)
PLATELET # BLD AUTO: 262 K/UL (ref 135–420)
PMV BLD AUTO: 10.4 FL (ref 9.2–11.8)
POTASSIUM SERPL-SCNC: 4.2 MMOL/L (ref 3.5–5.5)
RBC # BLD AUTO: 3.17 M/UL (ref 4.35–5.65)
SODIUM SERPL-SCNC: 134 MMOL/L (ref 136–145)
WBC # BLD AUTO: 5.4 K/UL (ref 4.6–13.2)

## 2022-06-04 PROCEDURE — 74011250637 HC RX REV CODE- 250/637: Performed by: INTERNAL MEDICINE

## 2022-06-04 PROCEDURE — 74011250637 HC RX REV CODE- 250/637: Performed by: HOSPITALIST

## 2022-06-04 PROCEDURE — 74011250636 HC RX REV CODE- 250/636: Performed by: HOSPITALIST

## 2022-06-04 PROCEDURE — 74011000250 HC RX REV CODE- 250: Performed by: HOSPITALIST

## 2022-06-04 PROCEDURE — 80048 BASIC METABOLIC PNL TOTAL CA: CPT

## 2022-06-04 PROCEDURE — 82962 GLUCOSE BLOOD TEST: CPT

## 2022-06-04 PROCEDURE — 74011000250 HC RX REV CODE- 250: Performed by: STUDENT IN AN ORGANIZED HEALTH CARE EDUCATION/TRAINING PROGRAM

## 2022-06-04 PROCEDURE — 77010033678 HC OXYGEN DAILY

## 2022-06-04 PROCEDURE — 84100 ASSAY OF PHOSPHORUS: CPT

## 2022-06-04 PROCEDURE — 83735 ASSAY OF MAGNESIUM: CPT

## 2022-06-04 PROCEDURE — 85025 COMPLETE CBC W/AUTO DIFF WBC: CPT

## 2022-06-04 PROCEDURE — 65270000046 HC RM TELEMETRY

## 2022-06-04 PROCEDURE — 36415 COLL VENOUS BLD VENIPUNCTURE: CPT

## 2022-06-04 RX ORDER — SAME BUTANEDISULFONATE/BETAINE 400-600 MG
250 POWDER IN PACKET (EA) ORAL 2 TIMES DAILY
Status: DISCONTINUED | OUTPATIENT
Start: 2022-06-04 | End: 2022-06-07 | Stop reason: HOSPADM

## 2022-06-04 RX ADMIN — ISOSORBIDE MONONITRATE 60 MG: 60 TABLET, EXTENDED RELEASE ORAL at 08:15

## 2022-06-04 RX ADMIN — SODIUM CHLORIDE, PRESERVATIVE FREE 10 ML: 5 INJECTION INTRAVENOUS at 06:00

## 2022-06-04 RX ADMIN — SEVELAMER CARBONATE 800 MG: 800 TABLET, FILM COATED ORAL at 11:06

## 2022-06-04 RX ADMIN — SODIUM CHLORIDE, PRESERVATIVE FREE 10 ML: 5 INJECTION INTRAVENOUS at 22:48

## 2022-06-04 RX ADMIN — SODIUM CHLORIDE, PRESERVATIVE FREE 10 ML: 5 INJECTION INTRAVENOUS at 18:06

## 2022-06-04 RX ADMIN — SEVELAMER CARBONATE 800 MG: 800 TABLET, FILM COATED ORAL at 18:05

## 2022-06-04 RX ADMIN — Medication 250 MG: at 22:36

## 2022-06-04 RX ADMIN — HEPARIN SODIUM 5000 UNITS: 5000 INJECTION INTRAVENOUS; SUBCUTANEOUS at 18:05

## 2022-06-04 RX ADMIN — AMLODIPINE BESYLATE 5 MG: 5 TABLET ORAL at 08:15

## 2022-06-04 RX ADMIN — HEPARIN SODIUM 5000 UNITS: 5000 INJECTION INTRAVENOUS; SUBCUTANEOUS at 08:14

## 2022-06-04 RX ADMIN — FUROSEMIDE 80 MG: 40 TABLET ORAL at 08:15

## 2022-06-04 RX ADMIN — PANTOPRAZOLE SODIUM 40 MG: 40 TABLET, DELAYED RELEASE ORAL at 08:15

## 2022-06-04 RX ADMIN — FUROSEMIDE 80 MG: 40 TABLET ORAL at 22:36

## 2022-06-04 RX ADMIN — CARVEDILOL 12.5 MG: 12.5 TABLET, FILM COATED ORAL at 18:05

## 2022-06-04 RX ADMIN — ASPIRIN 81 MG: 81 TABLET, COATED ORAL at 08:15

## 2022-06-04 RX ADMIN — CARVEDILOL 12.5 MG: 12.5 TABLET, FILM COATED ORAL at 08:15

## 2022-06-04 RX ADMIN — SEVELAMER CARBONATE 800 MG: 800 TABLET, FILM COATED ORAL at 08:15

## 2022-06-04 NOTE — PROGRESS NOTES
@2000 pt taken over awake alert oriented x4 , denies pain at present. Dressing to dialysis cath in rt chest intact. Dialysis in progress, Assessment completed and recorded in flow sheets . Nursing management continues. @0000 pt reassessed no changes observation continues. @0400 reassessment done no changes care continues. @4938 Bedside and Verbal shift change report given to Cristela Krabbe (oncoming nurse) by Sonja Bautista (offgoing nurse). Report included the following information SBAR, Kardex, Intake/Output, MAR, Recent Results, Med Rec Status and Alarm Parameters .

## 2022-06-04 NOTE — PROGRESS NOTES
Patient:  Nicole Bassett  :  1970  Gender:  male  MRN #:  [id number]    Assessment:   Nicole Bassett is a 54 y.o. male with h/o ESRD on HD , non compliant with dialysis /skip multiple dialysis session every month despite knowing the risk, was brought due to change in mental status   Admitted for hyperkalemia , hypoglycemia and stroke work up. No stoke per neurology . Hemodynamically stable      # ESRD  # Uncontrolled Hypertension- Improved  # Hyperphosphate   # hyperkalemia - Improved   # Secondary Hyperparathyroidism     Plan:    Patient examined and labs reviewed this morning. No clinical and metabolic indication of dialysis today   Next dialysis on Monday   - Appreciate vascular assistance, unable to salvage LUE graft , had TDC f/u with vascular as outpatient for new access     - Dose all meds for ESRD    BMD- has secondary Hyperparathyroidism  Continue  calcitriol 1 mcg MWF  sevelamer for phos binders       Uncontrolled Hypertension: BP is below target range in fewer than home medicine suggesting poor adherence/compliance at home   Stop Amlodipine   For now continue coreg 12.5 mg BID only   Still has residual renal function so will continue lasix     Anemia of CKD- Retacrit during HD     Subjective/Interval Events    Says he feels good , but has intermittent diarrhea   Denied chest pain and SOB   No other symptoms       Blood pressure (!) 112/43, pulse 90, temperature 98.6 °F (37 °C), resp. rate 17, height 5' 7\" (1.702 m), weight 77.6 kg (171 lb), SpO2 95 %. Exam:      Lung: clear to auscultation  Abdomen: soft, non tender  Ext: no edema in LLE  RT BKA status   CNS- Oriented to time , place and person  [facility] does not have thrill and bruit      [unfilled]   Recent Labs     22  0533   WBC 5.4   HCT 24.8*   MCV 78.2     Recent Labs     22  0533   *   CO2 30   BUN 30*     No results for input(s): ALBUMIN, AGRAT in the last 72 hours.     No lab exists for component: TOTPR, SGOTAST, ALKPHOS, BILIT, BILID, SGPTALT, GLOBULIN    Medical Decision Making :    I have reviewed patient's record for pertinent labs, radiology and other test . I have reviewed old records. I have ordered labs, medications and radiology as necessary and indicated per patient's condition . Total time spent is approximately 26 minutes. Greater than 50 % of that time was spent in counseling and or care coordination.      Jem Miller MD  Nephrology -[facility], [facility].

## 2022-06-04 NOTE — PROGRESS NOTES
Hospitalist Progress Note    Patient: Connie Dai MRN: [id number]  CSN: [id number]    YOB: 1970  Age: 54 y.o. Sex: male    DOA: 6/1/2022 LOS:  LOS: 3 days                Assessment/Plan     Patient Active Problem List   Diagnosis Code    Iron deficiency anemia D50.9    Other restrictive cardiomyopathy ([facility] I42.5    ESR[facility] (end stage renal disease) [facility] N18.6, Z99.2    HTN (hypertension) I10    Hx of BKA, right ([facility] Z89.511    Hypoglycemia E16.2    Hyperkalemia E87.5    Uremia N19    Non-compliance with [facility] ([facility] Z91.15    Toxic metabolic encephalopathy N72.3    Hypertension I10    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 male with end-stage renal disease on HD, diabetes, hypertension, right BKA presented to ER due to mental status changes. CRITICAL CARE PLAN    Resp -   Stable respiratory status, will use oxygen as needed by NC. ID - No evidence of infection. CVS - Monitor HD. Hypertensive urgency - resolved. BP controlled on coreg, lasix, imdur. CHF -   Volume overload       Heme/onc - Follow H&H, plts. INR. Renal - Trend BUN, Cr, follow I/O, barrett in place. Check and replace Mg, K, phos. ESRD - HD per nephrology  S/p LUE AVG thrombectomy    Endocrine -  Follow FSG    Neuro/ Pain/ Sedation -   AMS resolved  MRI brain with no stroke. GI -   Renal diet. Prophylaxis - DVT: heparin, GI: protonix      Disposition : TBD    Review of systems  General: No fevers or chills. Cardiovascular: No chest pain or pressure. No palpitations. Pulmonary: No shortness of breath. Gastrointestinal: No nausea, vomiting. Physical Exam:  General: Awake, cooperative, no acute distress    HEENT: NC, Atraumatic. PERRLA, anicteric sclerae. Lungs: CTA Bilaterally. No Wheezing/Rhonchi/Rales.   Heart:  S1 S2,  No murmur, No Rubs, No Gallops  Abdomen: Soft, Non distended, Non tender.  +Bowel sounds,   Extremities: Right BKA  Psych:   Not anxious or agitated. Neurologic:  No acute neurological deficit. Vital signs/Intake and Output:  Visit Vitals  BP (!) 112/43   Pulse 90   Temp 98.6 °F (37 °C)   Resp 17   Ht 5' 7\" (1.702 m)   Wt 77.6 kg (171 lb)   SpO2 95%   BMI 26.78 kg/m²     Current Shift:  No intake/output data recorded. Last three shifts:  06/02 1901 - 06/04 0700  In: -   Out: 1600 [Urine:100]            Labs: Results:       Chemistry Recent Labs     06/04/22  0533 06/03/22  0419 06/02/22  0151   GLU 80 100* 101*   * 136 135*   K 4.2 4.4 4.5    102 101   CO2 30 24 23   BUN 30* 70* 54*   CREA 5.56* 10.50* 8.20*   CA 7.8* 7.4* 7.5*   AGAP 4 10 11   BUCR 5* 7* 7*      CBC w/Diff Recent Labs     06/04/22  0533 06/03/22 0419 06/02/22  0513   WBC 5.4 5.2 4.7   RBC 3.17* 2.96* 2.98*   HGB 8.3* 7.8* 8.0*   HCT 24.8* 23.1* 23.2*    233 217   GRANS 76* 70 71   LYMPH 8* 11* 10*   EOS 4 4 4      Cardiac Enzymes No results for input(s): CPK, CKND1, WILLIE in the last 72 hours. No lab exists for component: CKRMB, TROIP   Coagulation No results for input(s): PTP, INR, APTT, INREXT in the last 72 hours. Lipid Panel Lab Results   Component Value Date/Time    Cholesterol, total 115 06/02/2022 09:45 AM    HDL Cholesterol 62 (H) 06/02/2022 09:45 AM    LDL, calculated 46 06/02/2022 09:45 AM    VLDL, calculated 7 06/02/2022 09:45 AM    Triglyceride 35 06/02/2022 09:45 AM    CHOL/HDL Ratio 1.9 06/02/2022 09:45 AM      BNP No results for input(s): BNPP in the last 72 hours. Liver Enzymes No results for input(s): TP, ALB, TBIL, AP in the last 72 hours.     No lab exists for component: SGOT, GPT, DBIL   Thyroid Studies Lab Results   Component Value Date/Time    TSH 2.38 08/29/2021 01:20 AM        Procedures/imaging: see electronic medical records for all procedures/Xrays and details which were not copied into this note but were reviewed prior to creation of Plan

## 2022-06-04 NOTE — PROGRESS NOTES
[facility]  Pulmonary, Critical Care, and Sleep Medicine    Name: Clara George MRN: [id number]   : 1970 Hospital: [facility] [place]    Date: 2022  Room: [facility]/[address] [place] [address] Note                                              Consult requesting physician: Dr. Mary Moreno  Reason for Consult: assisting with ICU care for metabolic encephalopathy, hypoglycemia    IMPRESSION:   · Pulmonary edema - J81.1  · Elevated HTN - I10  · Encephalopathy, 2' to below, resolved - G93.40  · Hypoglycemia, resolved - E16.2  · RT central pontine density - no new CVA on MRI brain - G93.9  · Hyperkalemia, improved - E82.5  Patient Active Problem List   Diagnosis Code    Iron deficiency anemia D50.9    Other restrictive cardiomyopathy ([address] I42.5    ESRD (end stage renal disease) on dialysis ([address] N18.6, Z99.2    HTN (hypertension) I10    Hx of BKA, right ([address] Z89.511    Hypoglycemia E16.2    Hyperkalemia E87.5    Uremia N19    Non-compliance with renal dialysis ([address] Z91.15    Toxic metabolic encephalopathy F94.1    Hypertension I10    AV fistula occlusion ([address] T82.898A     · Code status: Full Code       RECOMMENDATIONS:     Respiratory: stable respiratory status. On room air  Chest x-ray 22 and 22 showed pulmonary edema. No respiratory distress   Reported dyspnea off and on likely related to chronic pulmonary edema in the view of inability to get HD with access malfunction and possible poor adherence  Keep SPO2 >=92%. HOB 30 degree elevation all the time. Aggressive pulmonary toileting. Aspiration precautions. Incentive spirometry educated and encouraged.     CVS: blood pressure stable and tolerating adjustment in medications dose  Initially presented with elevated blood pressure and was gradually lowered  Developed low blood pressure on home dose medications 22 concerning for adherence and required to lower the dosage  Denies any symptoms associated with elevated blood pressure  ECHO 6/2/2022    Left Ventricle: Normal left ventricular systolic function with a visually estimated EF of 60 - 65%. Left ventricle size is normal. Moderately increased wall thickness. Findings consistent with moderate concentric hypertrophy. Normal wall motion. Grade I diastolic dysfunction with normal LAP.   Left Atrium: Left atrium is mildly dilated.   Mitral Valve: Moderate annular calcification at the posterior leaflet of the mitral valve. Trace regurgitation. No stenosis noted.   Tricuspid Valve : Inadequate tricuspid regurgitant jejt to comment on estimated PASP.   Interatrial Septum: No interatrial shunt visualized with color Doppler. Grade 0 Absence of bubbles. Agitated saline study was negative with and without provocation. ID: no clinical evidence for sepsis or pneumonia  No fever or leukocytosis  Monitor off of antibiotic    Hematology/Oncology: Chronic anemia; stable Hgb and platelet-monitor daily CBC  No clinical evidence for bleeding anywhere    Renal: unsuccessful AVG occlusion thrombectomy 6/3/22  Patient may have uremia contributed to anorexia and related hypoglycemia  Vascular surgery placed tunneled HD access replacing TDC placed by IR on 6/1/22  Nephrology following. Further HD arrangements per nephrology  Correct electrolytes as needed with HD per nephrology  Hyperkalemia resolved with hemodialysis 6/1/22    GI/: Anorexia - improved and chronic diarrhea PTA  No evidence of acute abdomen on exam  PPI for GI prophylaxis    Endocrine: Monitor serial FSBS. No further hypoglycemia episode in ICU    Neurology: Abnormal CT head on admission with right central pontine hypodensity  MRI brain 6/1/2022 no new stroke  Neurology evaluating patient  US carotid 6/2/22  Bilateral less than 50% stenosis of the internal carotid arteries. No significant stenosis in the external carotid arteries bilaterally. Antegrade flow in both vertebral arteries.   Normal flow in both subclavian arteries    Pain/Sedation: Denies any chronic pain condition. Avoid opiate, sedative, hypnotic if possible    Skin/Wound: As per ICU nursing care    Electrolytes: Replace electrolytes per HD as per nephrology    Nutrition: Diet as tolerated. N.p.o. for procedure today    Prophylaxis: DVT Prophylaxis: SCD/heparin. GI Prophylaxis: Protonix. Restraints: none    Lines/Tubes: PIV  HD access 6/3/2022 (site examined, no erythema, induration, discharge or sign of infection. Dressing intact. Medically necessary. Central line bundle followed). PT/OT eval and treat, OOB when more stable    Advance Directive/Palliative Care: Full code. Consult as per clinical course. Prognosis guarded  Will defer respective systems problem management to primary and other respective consultant and follow patient in ICU with primary and other medical team.  Further recommendations will be based on the patient's response to recommended treatment and results of the investigation ordered. Quality Care: PPI, DVT prophylaxis, HOB elevated, Infection control all reviewed and addressed. Care of plan d/w RN, hospitalist  D/w patient (answered all questions to satisfaction). Transfer to tele - if ok per hospitalist. Will sign off once out of ICU    High complexity decision making was performed during the evaluation of this patient at high risk for decompensation with multiple organ involvement. Total critical care time spent rendering care exclusive of procedures/family discussion/coordination of care: 36 minutes. Subjective/History of Present Illness:   Patient is a 54 y.o. male with PMHx significant for ESRD on HD [M-W-F], DM, CHF, hypertension, sickle cell trait, anemia, PVD status post right BKA was brought by EMS to [facility] ER for complaint of generalized weakness, confusion, hypoglycemia. On arrival to ER, FSBS was 46 and responded to medical intervention with improvement in patient's mental status.   He reports generalized weakness started about 2 days ago PTA associated with somnolence. Today he woke up feeling more generalized weak. His friend over the phone reported him speaking slurred and alerted his brother. While trying to get out of home he could not remain standing and fell on floor before he could come to [facility] for his appointment regarding AV fistula malfunction. When his brother arrived at his place found him on the floor and called EMS. He has chronic diarrhea that has worsened over the last 1 month and reports weight loss. Denies HA, neck stiffness, photophobia, sore throat, sinus pain or discharge, cough, phlegm, dysuria, nausea, vomiting, rash, adenopathy, leg swelling or calf tenderness, urethral discharge. Has off-and-on dyspnea on exertion while denies cough, chest pain, wheezing or hemoptysis, palpitations, syncope, orthopnea, worsening of chronic right leg edema or paroxysmal nocturnal dyspnea. In ER, found to have elevated K+, S. Cr while low Hgb, blood glucose. Patient reports COVID vaccination with booster recently. Nephrology has been consulted to arrange HD. CT head showed abnormal density in the right central pontine area and nephrology has been consulted. Patient seen at bedside in ER room #3      06/04/22:   Patient seen in ICU at bedside room #108  On room air, no respiratory distress and protecting airways  The patient denies cough, chest pain, dyspnea, wheezing or hemoptysis. AAOx4. Following all commands and conversing appropriately without any respiratory distress  Unsuccessful AVG occlusion thrombectomy yesterday  Blood pressure stable; denies any palpitations, syncope, orthopnea, edema or paroxysmal nocturnal dyspnea. No fever. Stable chronic diarrhea without nausea, vomiting, abdominal pain  No further episode of hypoglycemia. Tolerating p.o. diet  PCCM was not contacted by staff on anything about patient overnight. I/O last 24 hrs:      Intake/Output Summary (Last 24 hours) at 6/4/2022 1113  Last data filed at 6/3/2022 2358  Gross per 24 hour   Intake --   Output 1600 ml   Net -1600 ml         Review of Systems:  General ROS: negative for  - fever, chills, weight loss  Cardiovascular ROS: negative for - chest pain, edema, murmur, orthopnea, palpitations or pnd  Gastrointestinal ROS: no nausea, vomiting, abdominal pain  Dermatological ROS: negative for - pruritus, rash or skin lesion changes       Allergies   Allergen Reactions    Penicillins Rash    Vancomycin Other (comments)     kayla syndrome      Past Medical History:   Diagnosis Date    Chronic kidney disease     Diabetes [facility]     Heart failure ([facility]     Hypertension     Sickle cell trait ([facility]       Past Surgical History:   Procedure Laterality Date    HX ORTHOPAEDIC      right BKA 2017    IR INSERT NON TUNL CVC OVER 5 YRS  6/1/2022      Social History     Tobacco Use    Smoking status: Former Smoker    Smokeless tobacco: Never Used   Substance Use Topics    Alcohol use: No      No family history on file. Prior to Admission medications    Medication Sig Start Date End Date Taking? Authorizing Provider   aspirin delayed-release 81 mg tablet Take 1 Tablet by mouth daily. 9/7/21   Enolia Cool, DO   doxazosin (CARDURA) 4 mg tablet Take 2 mg by mouth daily. Provider, Historical   sodium hypochlorite 0.0125% (DAKINS SOLUTION) Apply 1 mL to affected area two (2) times a week. Provider, Historical   omeprazole (PRILOSEC) 40 mg capsule Take 40 mg by mouth daily. Provider, Historical   sevelamer carbonate (RENVELA) 800 mg tab tab Take 800 mg by mouth three (3) times daily (with meals). Provider, Historical   sodium bicarbonate 650 mg tablet Take 650 mg by mouth three (3) times daily. Provider, Historical   amLODIPine (NORVASC) 10 mg tablet Take 1 Tab by mouth daily. 3/8/18   Shila Frost MD   carvedilol (COREG) 25 mg tablet Take 1 Tab by mouth two (2) times daily (with meals).  3/7/18   Shila Frost MD hydrALAZINE (APRESOLINE) 25 mg tablet Take 1 Tab by mouth three (3) times daily. Patient taking differently: Take 50 mg by mouth two (2) times a day. 3/7/18   Elfego Harkins MD   isosorbide mononitrate ER (IMDUR) 60 mg CR tablet Take 1 Tab by mouth daily. 3/8/18   Elfego Harkins MD   furosemide (LASIX) 80 mg tablet Take 1 Tab by mouth two (2) times a day. 3/7/18   Elfego Harkins MD     Current Facility-Administered Medications   Medication Dose Route Frequency    sodium chloride (NS) flush 5-40 mL  5-40 mL IntraVENous Q8H    iopamidoL (ISOVUE 300) 61 % contrast injection 1-100 mL  1-100 mL IntraCATHeter RAD CONTINUOUS    [START ON 2022] epoetin delonte-epbx (RETACRIT) injection 20,000 Units  20,000 Units SubCUTAneous DIALYSIS MON, WED & FRI    amLODIPine (NORVASC) tablet 5 mg  5 mg Oral DAILY    carvediloL (COREG) tablet 12.5 mg  12.5 mg Oral BID WITH MEALS    sodium chloride (NS) flush 5-40 mL  5-40 mL IntraVENous Q8H    heparin (porcine) injection 5,000 Units  5,000 Units SubCUTAneous Q8H    pantoprazole (PROTONIX) tablet 40 mg  40 mg Oral ACB    aspirin delayed-release tablet 81 mg  81 mg Oral DAILY    isosorbide mononitrate ER (IMDUR) tablet 60 mg  60 mg Oral DAILY    sevelamer carbonate (RENVELA) tab 800 mg  800 mg Oral TID WITH MEALS    furosemide (LASIX) tablet 80 mg  80 mg Oral BID         Objective:   Vital Signs:    Visit Vitals  BP (!) 127/59   Pulse 89   Temp 98.3 °F (36.8 °C)   Resp 16   Ht 5' 7\" (1.702 m)   Wt 77.6 kg (171 lb)   SpO2 97%   BMI 26.78 kg/m²       O2 Device: None (Room air)   O2 Flow Rate (L/min): 2 l/min   Temp (24hrs), Av.1 °F (36.7 °C), Min:97.8 °F (36.6 °C), Max:98.9 °F (37.2 °C)       Intake/Output:   Last shift:      No intake/output data recorded.     Last 3 shifts:  1901 -  0700  In: -   Out: 1600 [Urine:100]      Intake/Output Summary (Last 24 hours) at 2022 1113  Last data filed at 6/3/2022 2358  Gross per 24 hour   Intake --   Output 1600 ml   Net -1600 ml Last 3 Recorded Weights in this Encounter    06/01/22 1700 06/01/22 2015 06/02/22 1126   Weight: 80.9 kg (178 lb 5.6 oz) 77.8 kg (171 lb 8.3 oz) 77.6 kg (171 lb)         No results for input(s): PHI, PHI, POC2, PCO2I, PO2, PO2I, HCO3, HCO3I, FIO2, FIO2I in the last 72 hours.     Physical Exam:   General: AAO x 4, in no respiratory distress, cooperative, appears older than stated age, chronically ill looking, on room air  HEENT: PERRL, throat normal without erythema or exudate  Neck: No abnormally enlarged lymph nodes or thyroid, supple  Chest: normal; RT tunneled HD catheter in place w/o bleeding  Lungs: moderate air entry, improving few scattered rales bilaterally, no wheezing, normal percussion anterior chest wall bilaterally, no tenderness/ rash  Heart: Regular rate and rhythm, S1S2 present, or without murmur or extra heart sounds  Abdomen: Nondistended, bowel sounds normoactive, tympanic, abdomen is soft without significant tenderness, masses, organomegaly or guarding, rigidity, rebound  Extremity: No left leg edema, no cyanosis; peripheral pulses 2+ and symmetric upper extremity and left leg, capillary refill normal bilateral upper extremity and left leg; right BKA  Neuro: alert, oriented x 3, no defects noted in general exam.  Skin: Skin color, texture, turgor unchanged      Data:       Recent Results (from the past 24 hour(s))   GLUCOSE, POC    Collection Time: 06/03/22 11:44 AM   Result Value Ref Range    Glucose (POC) 87 70 - 110 mg/dL   GLUCOSE, POC    Collection Time: 06/03/22  5:14 PM   Result Value Ref Range    Glucose (POC) 73 70 - 110 mg/dL   GLUCOSE, POC    Collection Time: 06/03/22  9:42 PM   Result Value Ref Range    Glucose (POC) 120 (H) 70 - 110 mg/dL   GLUCOSE, POC    Collection Time: 06/04/22  5:06 AM   Result Value Ref Range    Glucose (POC) 76 70 - 110 mg/dL   MAGNESIUM    Collection Time: 06/04/22  5:33 AM   Result Value Ref Range    Magnesium 2.3 1.6 - 2.6 mg/dL   PHOSPHORUS Collection Time: 06/04/22  5:33 AM   Result Value Ref Range    Phosphorus 4.8 2.5 - 4.9 MG/DL   METABOLIC PANEL, BASIC    Collection Time: 06/04/22  5:33 AM   Result Value Ref Range    Sodium 134 (L) 136 - 145 mmol/L    Potassium 4.2 3.5 - 5.5 mmol/L    Chloride 100 100 - 111 mmol/L    CO2 30 21 - 32 mmol/L    Anion gap 4 3.0 - 18 mmol/L    Glucose 80 74 - 99 mg/dL    BUN 30 (H) 7.0 - 18 MG/DL    Creatinine 5.56 (H) 0.6 - 1.3 MG/DL    BUN/Creatinine ratio 5 (L) 12 - 20      GFR est AA 13 (L) >60 ml/min/1.73m2    GFR est non-AA 11 (L) >60 ml/min/1.73m2    Calcium 7.8 (L) 8.5 - 10.1 MG/DL   CBC WITH AUTOMATED DIFF    Collection Time: 06/04/22  5:33 AM   Result Value Ref Range    WBC 5.4 4.6 - 13.2 K/uL    RBC 3.17 (L) 4.35 - 5.65 M/uL    HGB 8.3 (L) 13.0 - 16.0 g/dL    HCT 24.8 (L) 36.0 - 48.0 %    MCV 78.2 78.0 - 100.0 FL    MCH 26.2 24.0 - 34.0 PG    MCHC 33.5 31.0 - 37.0 g/dL    RDW 18.0 (H) 11.6 - 14.5 %    PLATELET 947 003 - 560 K/uL    MPV 10.4 9.2 - 11.8 FL    NRBC 0.0 0  WBC    ABSOLUTE NRBC 0.00 0.00 - 0.01 K/uL    NEUTROPHILS 76 (H) 40 - 73 %    LYMPHOCYTES 8 (L) 21 - 52 %    MONOCYTES 12 (H) 3 - 10 %    EOSINOPHILS 4 0 - 5 %    BASOPHILS 1 0 - 2 %    IMMATURE GRANULOCYTES 0 0.0 - 0.5 %    ABS. NEUTROPHILS 4.1 1.8 - 8.0 K/UL    ABS. LYMPHOCYTES 0.4 (L) 0.9 - 3.6 K/UL    ABS. MONOCYTES 0.7 0.05 - 1.2 K/UL    ABS. EOSINOPHILS 0.2 0.0 - 0.4 K/UL    ABS. BASOPHILS 0.0 0.0 - 0.1 K/UL    ABS. IMM.  GRANS. 0.0 0.00 - 0.04 K/UL    DF AUTOMATED     GLUCOSE, POC    Collection Time: 06/04/22  6:48 AM   Result Value Ref Range    Glucose (POC) 87 70 - 110 mg/dL         Chemistry Recent Labs     06/04/22  0533 06/03/22  0419 06/02/22  0151   GLU 80 100* 101*   * 136 135*   K 4.2 4.4 4.5    102 101   CO2 30 24 23   BUN 30* 70* 54*   CREA 5.56* 10.50* 8.20*   CA 7.8* 7.4* 7.5*   MG 2.3 2.9* 2.5   PHOS 4.8 8.1* 5.8*   AGAP 4 10 11   BUCR 5* 7* 7*        Lactic Acid Lactic acid   Date Value Ref Range Status 08/27/2021 1.1 0.4 - 2.0 MMOL/L Final     No results for input(s): LAC in the last 72 hours. Liver Enzymes Protein, total   Date Value Ref Range Status   06/01/2022 7.5 6.4 - 8.2 g/dL Final     Albumin   Date Value Ref Range Status   06/01/2022 3.0 (L) 3.4 - 5.0 g/dL Final     Globulin   Date Value Ref Range Status   06/01/2022 4.5 (H) 2.0 - 4.0 g/dL Final     A-G Ratio   Date Value Ref Range Status   06/01/2022 0.7 (L) 0.8 - 1.7   Final     Alk. phosphatase   Date Value Ref Range Status   06/01/2022 86 45 - 117 U/L Final     No results for input(s): TP, ALB, GLOB, AGRAT, AP, TBIL in the last 72 hours. No lab exists for component: SGOT, GPT, DBIL     CBC w/Diff Recent Labs     06/04/22  0533 06/03/22  0419 06/02/22  0513   WBC 5.4 5.2 4.7   RBC 3.17* 2.96* 2.98*   HGB 8.3* 7.8* 8.0*   HCT 24.8* 23.1* 23.2*    233 217   GRANS 76* 70 71   LYMPH 8* 11* 10*   EOS 4 4 4        Cardiac Enzymes No results found for: CPK, CK, CKMMB, CKMB, RCK3, CKMBT, CKNDX, CKND1, WILLIE, TROPT, TROIQ, CHANDA, TROPT, TNIPOC, BNP, BNPP     BNP No results found for: BNP, BNPP, XBNPT     Coagulation No results for input(s): PTP, INR, APTT, INREXT, INREXT in the last 72 hours.       Thyroid  Lab Results   Component Value Date/Time    TSH 2.38 08/29/2021 01:20 AM       No results found for: T4     Urinalysis Lab Results   Component Value Date/Time    Color YELLOW 03/02/2018 05:41 AM    Appearance CLEAR 03/02/2018 05:41 AM    Specific gravity 1.010 03/02/2018 05:41 AM    pH (UA) 5.0 03/02/2018 05:41 AM    Protein 300 (A) 03/02/2018 05:41 AM    Glucose NEGATIVE  03/02/2018 05:41 AM    Ketone NEGATIVE  03/02/2018 05:41 AM    Bilirubin NEGATIVE  03/02/2018 05:41 AM    Urobilinogen 0.2 03/02/2018 05:41 AM    Nitrites NEGATIVE  03/02/2018 05:41 AM    Leukocyte Esterase NEGATIVE  03/02/2018 05:41 AM    Epithelial cells FEW 03/02/2018 05:41 AM    Bacteria FEW (A) 03/02/2018 05:41 AM    WBC 0 to 2 03/02/2018 05:41 AM    RBC NEGATIVE 03/02/2018 05:41 AM        Micro  No results for input(s): SDES, CULT in the last 72 hours. No results for input(s): CULT in the last 72 hours. Culture data during this hospitalization. All Micro Results     None             CT ABD PELV WO CONT    Result Date: 5/16/2022  EXAM: CT of the abdomen and pelvis CLINICAL INDICATION/HISTORY: LLQ abd pain, constipation and anal pain   > Additional: None. COMPARISON: 09/01/2021   > Reference Exam: None. TECHNIQUE: Axial CT imaging of the abdomen and pelvis was performed without intravenous contrast. Oral contrast not administered. Multiplanar reformats were generated. Dose reduction techniques used: automated exposure control, adjustment of the mAs and/or kVp according to patient size, and iterative reconstruction techniques. Digital Imaging and Communications in Medicine (DICOM) format image data are available to nonaffiliated external healthcare facilities or entities on a secure, media free, reciprocally searchable basis with patient authorization for at least a 12-month period after this study. _______________ FINDINGS: LOWER CHEST: Lung bases are clear. Heart is mildly enlarged without pericardial effusion. LIVER, BILIARY: Liver is unremarkable. No biliary dilation. Gallbladder is markedly contracted but otherwise unremarkable. PANCREAS: Unremarkable. SPLEEN: Unremarkable. ADRENALS: Unremarkable. KIDNEYS/URETERS/BLADDER: No hydronephrosis. No calculi. LYMPH NODES: No enlarged lymph nodes. GASTROINTESTINAL TRACT: No bowel dilation or wall thickening. Colonic diverticulosis. PELVIC ORGANS: Unremarkable. VASCULATURE: Moderate to marked atherosclerosis. BONES: No acute or aggressive osseous abnormalities identified. OTHER: No ascites. _______________     1. No acute intra-abdominal abnormality. Colonic diverticulosis without acute diverticulitis. * **  *        Images report reviewed by me:    CXR reviewed by me:  XR (Most Recent).  CXR  reviewed by me and compared with previous CXR Results from [place] encounter on 06/01/22    XR CHEST PORT    Narrative  EXAM: XR CHEST PORT    CLINICAL INDICATION/HISTORY: Encephalopathy  -Additional: None    COMPARISON: 1/16/2022    TECHNIQUE: Portable frontal view of the chest    _______________    FINDINGS:    SUPPORT DEVICES: Interval placement of a right IJV vessels catheter with tip at  the cavoatrial junction. HEART AND MEDIASTINUM: Cardiomegaly. LUNGS AND PLEURAL SPACES: Bilateral parenchymal/interstitial haziness. No large  effusion or pneumothorax.    _______________    Impression  Cardiomegaly with pulmonary edema. ·Please note: Voice-recognition software may have been used to generate this report, which may have resulted in some phonetic-based errors in grammar and contents. Even though attempts were made to correct all the mistakes, some may have been missed, and remained in the body of the document.       Alia Chan MD  6/4/2022

## 2022-06-05 LAB
ANION GAP SERPL CALC-SCNC: 9 MMOL/L (ref 3–18)
BASOPHILS # BLD: 0.1 K/UL (ref 0–0.1)
BASOPHILS NFR BLD: 1 % (ref 0–2)
BUN SERPL-MCNC: 43 MG/DL (ref 7–18)
BUN/CREAT SERPL: 6 (ref 12–20)
CALCIUM SERPL-MCNC: 7.8 MG/DL (ref 8.5–10.1)
CHLORIDE SERPL-SCNC: 104 MMOL/L (ref 100–111)
CO2 SERPL-SCNC: 25 MMOL/L (ref 21–32)
CREAT SERPL-MCNC: 7.61 MG/DL (ref 0.6–1.3)
DIFFERENTIAL METHOD BLD: ABNORMAL
EOSINOPHIL # BLD: 0.3 K/UL (ref 0–0.4)
EOSINOPHIL NFR BLD: 3 % (ref 0–5)
ERYTHROCYTE [DISTWIDTH] IN BLOOD BY AUTOMATED COUNT: 18.4 % (ref 11.6–14.5)
GLUCOSE BLD STRIP.AUTO-MCNC: 106 MG/DL (ref 70–110)
GLUCOSE BLD STRIP.AUTO-MCNC: 108 MG/DL (ref 70–110)
GLUCOSE BLD STRIP.AUTO-MCNC: 93 MG/DL (ref 70–110)
GLUCOSE BLD STRIP.AUTO-MCNC: 94 MG/DL (ref 70–110)
GLUCOSE SERPL-MCNC: 88 MG/DL (ref 74–99)
HCT VFR BLD AUTO: 22.9 % (ref 36–48)
HGB BLD-MCNC: 7.6 G/DL (ref 13–16)
IMM GRANULOCYTES # BLD AUTO: 0.1 K/UL (ref 0–0.04)
IMM GRANULOCYTES NFR BLD AUTO: 1 % (ref 0–0.5)
LYMPHOCYTES # BLD: 0.6 K/UL (ref 0.9–3.6)
LYMPHOCYTES NFR BLD: 6 % (ref 21–52)
MAGNESIUM SERPL-MCNC: 2.7 MG/DL (ref 1.6–2.6)
MCH RBC QN AUTO: 26.4 PG (ref 24–34)
MCHC RBC AUTO-ENTMCNC: 33.2 G/DL (ref 31–37)
MCV RBC AUTO: 79.5 FL (ref 78–100)
MONOCYTES # BLD: 1.1 K/UL (ref 0.05–1.2)
MONOCYTES NFR BLD: 12 % (ref 3–10)
NEUTS SEG # BLD: 7.6 K/UL (ref 1.8–8)
NEUTS SEG NFR BLD: 79 % (ref 40–73)
NRBC # BLD: 0 K/UL (ref 0–0.01)
NRBC BLD-RTO: 0 PER 100 WBC
PHOSPHATE SERPL-MCNC: 6.5 MG/DL (ref 2.5–4.9)
PLATELET # BLD AUTO: 263 K/UL (ref 135–420)
PMV BLD AUTO: 10.1 FL (ref 9.2–11.8)
POTASSIUM SERPL-SCNC: 5 MMOL/L (ref 3.5–5.5)
RBC # BLD AUTO: 2.88 M/UL (ref 4.35–5.65)
SODIUM SERPL-SCNC: 138 MMOL/L (ref 136–145)
WBC # BLD AUTO: 9.6 K/UL (ref 4.6–13.2)

## 2022-06-05 PROCEDURE — 2709999900 HC NON-CHARGEABLE SUPPLY

## 2022-06-05 PROCEDURE — 84100 ASSAY OF PHOSPHORUS: CPT

## 2022-06-05 PROCEDURE — 74011250636 HC RX REV CODE- 250/636: Performed by: HOSPITALIST

## 2022-06-05 PROCEDURE — 83735 ASSAY OF MAGNESIUM: CPT

## 2022-06-05 PROCEDURE — 74011250637 HC RX REV CODE- 250/637: Performed by: INTERNAL MEDICINE

## 2022-06-05 PROCEDURE — 82962 GLUCOSE BLOOD TEST: CPT

## 2022-06-05 PROCEDURE — 74011250637 HC RX REV CODE- 250/637: Performed by: FAMILY MEDICINE

## 2022-06-05 PROCEDURE — 74011250637 HC RX REV CODE- 250/637: Performed by: HOSPITALIST

## 2022-06-05 PROCEDURE — 74011000250 HC RX REV CODE- 250: Performed by: HOSPITALIST

## 2022-06-05 PROCEDURE — 85025 COMPLETE CBC W/AUTO DIFF WBC: CPT

## 2022-06-05 PROCEDURE — 36415 COLL VENOUS BLD VENIPUNCTURE: CPT

## 2022-06-05 PROCEDURE — 80048 BASIC METABOLIC PNL TOTAL CA: CPT

## 2022-06-05 PROCEDURE — 65270000046 HC RM TELEMETRY

## 2022-06-05 PROCEDURE — 74011000250 HC RX REV CODE- 250: Performed by: STUDENT IN AN ORGANIZED HEALTH CARE EDUCATION/TRAINING PROGRAM

## 2022-06-05 RX ORDER — LOPERAMIDE HYDROCHLORIDE 2 MG/1
2 CAPSULE ORAL
Status: DISCONTINUED | OUTPATIENT
Start: 2022-06-05 | End: 2022-06-07 | Stop reason: HOSPADM

## 2022-06-05 RX ADMIN — FUROSEMIDE 80 MG: 40 TABLET ORAL at 08:27

## 2022-06-05 RX ADMIN — HEPARIN SODIUM 5000 UNITS: 5000 INJECTION INTRAVENOUS; SUBCUTANEOUS at 17:52

## 2022-06-05 RX ADMIN — CARVEDILOL 12.5 MG: 12.5 TABLET, FILM COATED ORAL at 08:27

## 2022-06-05 RX ADMIN — HEPARIN SODIUM 5000 UNITS: 5000 INJECTION INTRAVENOUS; SUBCUTANEOUS at 08:27

## 2022-06-05 RX ADMIN — LOPERAMIDE HYDROCHLORIDE 2 MG: 2 CAPSULE ORAL at 01:53

## 2022-06-05 RX ADMIN — LOPERAMIDE HYDROCHLORIDE 2 MG: 2 CAPSULE ORAL at 17:52

## 2022-06-05 RX ADMIN — LOPERAMIDE HYDROCHLORIDE 2 MG: 2 CAPSULE ORAL at 11:09

## 2022-06-05 RX ADMIN — ASPIRIN 81 MG: 81 TABLET, COATED ORAL at 08:27

## 2022-06-05 RX ADMIN — CARVEDILOL 12.5 MG: 12.5 TABLET, FILM COATED ORAL at 17:52

## 2022-06-05 RX ADMIN — LOPERAMIDE HYDROCHLORIDE 2 MG: 2 CAPSULE ORAL at 06:54

## 2022-06-05 RX ADMIN — Medication 250 MG: at 08:27

## 2022-06-05 RX ADMIN — SEVELAMER CARBONATE 800 MG: 800 TABLET, FILM COATED ORAL at 17:52

## 2022-06-05 RX ADMIN — PANTOPRAZOLE SODIUM 40 MG: 40 TABLET, DELAYED RELEASE ORAL at 06:54

## 2022-06-05 RX ADMIN — HEPARIN SODIUM 5000 UNITS: 5000 INJECTION INTRAVENOUS; SUBCUTANEOUS at 01:06

## 2022-06-05 RX ADMIN — SODIUM CHLORIDE, PRESERVATIVE FREE 5 ML: 5 INJECTION INTRAVENOUS at 14:00

## 2022-06-05 RX ADMIN — SEVELAMER CARBONATE 800 MG: 800 TABLET, FILM COATED ORAL at 11:09

## 2022-06-05 RX ADMIN — SODIUM CHLORIDE, PRESERVATIVE FREE 10 ML: 5 INJECTION INTRAVENOUS at 21:10

## 2022-06-05 RX ADMIN — ISOSORBIDE MONONITRATE 60 MG: 60 TABLET, EXTENDED RELEASE ORAL at 08:27

## 2022-06-05 RX ADMIN — Medication 250 MG: at 21:10

## 2022-06-05 RX ADMIN — SEVELAMER CARBONATE 800 MG: 800 TABLET, FILM COATED ORAL at 08:27

## 2022-06-05 RX ADMIN — SODIUM CHLORIDE, PRESERVATIVE FREE 10 ML: 5 INJECTION INTRAVENOUS at 06:56

## 2022-06-05 NOTE — PROGRESS NOTES
Problem: Pressure Injury - Risk of  Goal: *Prevention of pressure injury  Description: Document Tucker Scale and appropriate interventions in the flowsheet. Outcome: Progressing Towards Goal  Note: Pressure Injury Interventions:       Moisture Interventions: Absorbent underpads,Contain wound drainage,Maintain skin hydration (lotion/cream),Moisture barrier,Minimize layers    Activity Interventions: Assess need for specialty bed,Chair cushion,Increase time out of bed,Pressure redistribution bed/mattress(bed type),PT/OT evaluation    Mobility Interventions: HOB 30 degrees or less,Pressure redistribution bed/mattress (bed type)    Nutrition Interventions: Offer support with meals,snacks and hydration,Discuss nutritional consult with provider,Document food/fluid/supplement intake    Friction and Shear Interventions: HOB 30 degrees or less                Problem: Patient Education: Go to Patient Education Activity  Goal: Patient/Family Education  Outcome: Progressing Towards Goal     Problem: Falls - Risk of  Goal: *Absence of Falls  Description: Document Vanessa Fall Risk and appropriate interventions in the flowsheet. Outcome: Progressing Towards Goal  Note: Fall Risk Interventions:  Mobility Interventions: Bed/chair exit alarm,Communicate number of staff needed for ambulation/transfer,OT consult for ADLs,Patient to call before getting OOB,PT Consult for mobility concerns,PT Consult for assist device competence,Strengthening exercises (ROM-active/passive),Utilize walker, cane, or other assistive device         Medication Interventions: Bed/chair exit alarm,Evaluate medications/consider consulting pharmacy,Patient to call before getting OOB,Teach patient to arise slowly    Elimination Interventions:  Toileting schedule/hourly rounds,Stay With Me (per policy),Toilet paper/wipes in reach,Patient to call for help with toileting needs,Elevated toilet seat,Call light in reach,Bed/chair exit alarm,Urinal in reach (dialysis)    History of Falls Interventions: Bed/chair exit alarm,Door open when patient unattended,Consult care management for discharge planning,Evaluate medications/consider consulting pharmacy,Assess for delayed presentation/identification of injury for 48 hrs (comment for end date),Vital signs minimum Q4HRs X 24 hrs (comment for end date)         Problem: Patient Education: Go to Patient Education Activity  Goal: Patient/Family Education  Outcome: Progressing Towards Goal     Problem: Chronic Renal Failure  Goal: *Fluid and electrolytes stabilized  Outcome: Progressing Towards Goal     Problem: Patient Education: Go to Patient Education Activity  Goal: Patient/Family Education  Outcome: Progressing Towards Goal     Problem: Risk for Spread of Infection  Goal: Prevent transmission of infectious organism to others  Description: Prevent the transmission of infectious organisms to other patients, staff members, and visitors.   Outcome: Progressing Towards Goal     Problem: Patient Education:  Go to Education Activity  Goal: Patient/Family Education  Outcome: Progressing Towards Goal

## 2022-06-05 NOTE — PROGRESS NOTES
Bedside report received on pt    2238: POC glucose 78, bed time snacks administered    0035: VS done, pt assisted with bedpan, stool watery; pt on florastor. Will notify MD of output.

## 2022-06-05 NOTE — PROGRESS NOTES
Patient:  Eolina Pinto  :  1970  Gender:  male  MRN #:  [id number]    Assessment:   Eloina Pinto is a 54 y.o. male with h/o ESRD on HD , non compliant with dialysis /skip multiple dialysis session every month despite knowing the risk, was brought due to change in mental status   Admitted for hyperkalemia , hypoglycemia and stroke work up. No stoke per neurology . Hemodynamically stable      # ESRD  # Uncontrolled Hypertension- Improved  # Hyperphosphate   # hyperkalemia - Improved   # Secondary Hyperparathyroidism     Plan:    Patient examined and labs reviewed this morning. 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 For now continue coreg 12.5 mg BID only   Still has residual renal function so will continue lasix     Anemia of CKD- Retacrit during HD     Subjective/Interval Events    Says he feels good , but has intermittent diarrhea , reports improving   Denied chest pain and SOB   No other symptoms       Blood pressure (!) 140/58, pulse 88, temperature 98.4 °F (36.9 °C), resp. rate 16, height 5' 7\" (1.702 m), weight 77.6 kg (171 lb), SpO2 99 %. Exam:      Lung: clear to auscultation  Abdomen: soft, non tender  Ext: no edema in LLE  RT BKA status   CNS- Oriented to time , place and person  [facility] does not have thrill and bruit      [unfilled]   Recent Labs     22  0155   WBC 9.6   HCT 22.9*   MCV 79.5     Recent Labs     22  0155      CO2 25   BUN 43*     No results for input(s): ALBUMIN, AGRAT in the last 72 hours.     No lab exists for component: TOTPR, SGOTAST, ALKPHOS, BILIT, BILID, MALICK ORTIZ    Medical Decision Making :    I have reviewed patient's record for pertinent labs, radiology and other test . I have reviewed old records. I have ordered labs, medications and radiology as necessary and indicated per patient's condition . Total time spent is approximately 25 minutes. Greater than 50 % of that time was spent in counseling and or care coordination.      Deidra Bullard MD  Nephrology -[facility], [facility].

## 2022-06-05 NOTE — PROGRESS NOTES
Noted pt transfer to tele from ICU. Pt has ESRD and goes to  [facility] for hemodialysis and  manages his own transportation to and from. CM to continue to follow and assist with transition of care.

## 2022-06-05 NOTE — PROGRESS NOTES
Hospitalist Progress Note    Patient: Floresita Cao MRN: [id number]  CSN: [id number]    YOB: 1970  Age: 54 y.o. Sex: male    DOA: 6/1/2022 LOS:  LOS: 4 days                Assessment/Plan     Patient Active Problem List   Diagnosis Code    Iron deficiency anemia D50.9    Other restrictive cardiomyopathy ([facility] I42.5    ESRD (end stage renal disease) on dialysis ([facility] N18.6, Z99.2    HTN (hypertension) I10    Hx of BKA, right ([address] Z89.511    Hypoglycemia E16.2    Hyperkalemia E87.5    Uremia N19    Non-compliance with renal dialysis ([facility] Z91.15    Toxic metabolic encephalopathy T06.8    Hypertension I10    AV fistula occlusion ([facility] R12.747Z        Chief complaint :  AMS  54 y.o. male with end-stage renal disease on HD, diabetes, hypertension, right BKA presented to ER due to mental status changes. CRITICAL CARE PLAN    Resp -   Stable respiratory status, will use oxygen as needed by NC. ID - No evidence of infection. CVS - Monitor HD. Hypertensive urgency - resolved. BP controlled on coreg, lasix, imdur. CHF -   Volume overload       Heme/onc - Follow H&H, plts. INR. Renal - Trend BUN, Cr, follow I/O, barrett in place. Check and replace Mg, K, phos. ESRD - HD per nephrology  S/p LUE AVG thrombectomy    Endocrine -  Follow FSG    Neuro/ Pain/ Sedation -   AMS resolved  MRI brain with no stroke. GI -   Renal diet. Prophylaxis - DVT: heparin, GI: protonix      Disposition : TBD    Review of systems  General: No fevers or chills. Cardiovascular: No chest pain or pressure. No palpitations. Pulmonary: No shortness of breath. Gastrointestinal: No nausea, vomiting. Physical Exam:  General: Awake, cooperative, no acute distress    HEENT: NC, Atraumatic. PERRLA, anicteric sclerae. Lungs: CTA Bilaterally. No Wheezing/Rhonchi/Rales.   Heart:  S1 S2,  No murmur, No Rubs, No Gallops  Abdomen: Soft, Non distended, Non tender.  +Bowel sounds,   Extremities: Right BKA  Psych:   Not anxious or agitated. Neurologic:  No acute neurological deficit. Vital signs/Intake and Output:  Visit Vitals  BP (!) 134/51   Pulse 88   Temp 98.6 °F (37 °C)   Resp 16   Ht 5' 7\" (1.702 m)   Wt 77.6 kg (171 lb)   SpO2 99%   BMI 26.78 kg/m²     Current Shift:  06/05 0701 - 06/05 1900  In: 480 [P.O.:480]  Out: -   Last three shifts:  06/03 1901 - 06/05 0700  In: -   Out: 1600 [Urine:100]            Labs: Results:       Chemistry Recent Labs     06/05/22 0155 06/04/22  0533 06/03/22 0419   GLU 88 80 100*    134* 136   K 5.0 4.2 4.4    100 102   CO2 25 30 24   BUN 43* 30* 70*   CREA 7.61* 5.56* 10.50*   CA 7.8* 7.8* 7.4*   AGAP 9 4 10   BUCR 6* 5* 7*      CBC w/Diff Recent Labs     06/05/22 0155 06/04/22  0533 06/03/22 0419   WBC 9.6 5.4 5.2   RBC 2.88* 3.17* 2.96*   HGB 7.6* 8.3* 7.8*   HCT 22.9* 24.8* 23.1*    262 233   GRANS 79* 76* 70   LYMPH 6* 8* 11*   EOS 3 4 4      Cardiac Enzymes No results for input(s): CPK, CKND1, WILLIE in the last 72 hours. No lab exists for component: CKRMB, TROIP   Coagulation No results for input(s): PTP, INR, APTT, INREXT, INREXT in the last 72 hours. Lipid Panel Lab Results   Component Value Date/Time    Cholesterol, total 115 06/02/2022 09:45 AM    HDL Cholesterol 62 (H) 06/02/2022 09:45 AM    LDL, calculated 46 06/02/2022 09:45 AM    VLDL, calculated 7 06/02/2022 09:45 AM    Triglyceride 35 06/02/2022 09:45 AM    CHOL/HDL Ratio 1.9 06/02/2022 09:45 AM      BNP No results for input(s): BNPP in the last 72 hours. Liver Enzymes No results for input(s): TP, ALB, TBIL, AP in the last 72 hours.     No lab exists for component: SGOT, GPT, DBIL   Thyroid Studies Lab Results   Component Value Date/Time    TSH 2.38 08/29/2021 01:20 AM        Procedures/imaging: see electronic medical records for all procedures/Xrays and details which were not copied into this note but were reviewed prior to creation of Plan

## 2022-06-05 NOTE — PROGRESS NOTES
2000 Pt having mulitple episodes of loose stools, paged provider to recommend C-diff testing and imodium.

## 2022-06-05 NOTE — ROUTINE PROCESS
Bedside shift change report given to NAHEED CONNOR (oncoming nurse) by Cruz Robledo RN (offgoing nurse). Report included the following information SBAR, Procedure Summary, Intake/Output, MAR and Recent Results.

## 2022-06-06 LAB
ANION GAP SERPL CALC-SCNC: 7 MMOL/L (ref 3–18)
BASOPHILS # BLD: 0.1 K/UL (ref 0–0.1)
BASOPHILS NFR BLD: 1 % (ref 0–2)
BUN SERPL-MCNC: 61 MG/DL (ref 7–18)
BUN/CREAT SERPL: 7 (ref 12–20)
CALCIUM SERPL-MCNC: 8 MG/DL (ref 8.5–10.1)
CHLORIDE SERPL-SCNC: 108 MMOL/L (ref 100–111)
CO2 SERPL-SCNC: 23 MMOL/L (ref 21–32)
CREAT SERPL-MCNC: 9 MG/DL (ref 0.6–1.3)
DIFFERENTIAL METHOD BLD: ABNORMAL
EOSINOPHIL # BLD: 0.4 K/UL (ref 0–0.4)
EOSINOPHIL NFR BLD: 4 % (ref 0–5)
ERYTHROCYTE [DISTWIDTH] IN BLOOD BY AUTOMATED COUNT: 17.9 % (ref 11.6–14.5)
GLUCOSE BLD STRIP.AUTO-MCNC: 106 MG/DL (ref 70–110)
GLUCOSE BLD STRIP.AUTO-MCNC: 123 MG/DL (ref 70–110)
GLUCOSE BLD STRIP.AUTO-MCNC: 80 MG/DL (ref 70–110)
GLUCOSE BLD STRIP.AUTO-MCNC: 82 MG/DL (ref 70–110)
GLUCOSE SERPL-MCNC: 88 MG/DL (ref 74–99)
HCT VFR BLD AUTO: 21.8 % (ref 36–48)
HGB BLD-MCNC: 7.1 G/DL (ref 13–16)
IMM GRANULOCYTES # BLD AUTO: 0.1 K/UL (ref 0–0.04)
IMM GRANULOCYTES NFR BLD AUTO: 1 % (ref 0–0.5)
LYMPHOCYTES # BLD: 0.9 K/UL (ref 0.9–3.6)
LYMPHOCYTES NFR BLD: 10 % (ref 21–52)
MAGNESIUM SERPL-MCNC: 2.7 MG/DL (ref 1.6–2.6)
MCH RBC QN AUTO: 26.2 PG (ref 24–34)
MCHC RBC AUTO-ENTMCNC: 32.6 G/DL (ref 31–37)
MCV RBC AUTO: 80.4 FL (ref 78–100)
MONOCYTES # BLD: 1 K/UL (ref 0.05–1.2)
MONOCYTES NFR BLD: 11 % (ref 3–10)
NEUTS SEG # BLD: 6.6 K/UL (ref 1.8–8)
NEUTS SEG NFR BLD: 73 % (ref 40–73)
NRBC # BLD: 0 K/UL (ref 0–0.01)
NRBC BLD-RTO: 0 PER 100 WBC
PHOSPHATE SERPL-MCNC: 8 MG/DL (ref 2.5–4.9)
PLATELET # BLD AUTO: 249 K/UL (ref 135–420)
PMV BLD AUTO: 10.2 FL (ref 9.2–11.8)
POTASSIUM SERPL-SCNC: 5.6 MMOL/L (ref 3.5–5.5)
RBC # BLD AUTO: 2.71 M/UL (ref 4.35–5.65)
SODIUM SERPL-SCNC: 138 MMOL/L (ref 136–145)
WBC # BLD AUTO: 9 K/UL (ref 4.6–13.2)

## 2022-06-06 PROCEDURE — 74011250637 HC RX REV CODE- 250/637: Performed by: INTERNAL MEDICINE

## 2022-06-06 PROCEDURE — 80048 BASIC METABOLIC PNL TOTAL CA: CPT

## 2022-06-06 PROCEDURE — 65270000046 HC RM TELEMETRY

## 2022-06-06 PROCEDURE — 74011000250 HC RX REV CODE- 250: Performed by: STUDENT IN AN ORGANIZED HEALTH CARE EDUCATION/TRAINING PROGRAM

## 2022-06-06 PROCEDURE — 74011250637 HC RX REV CODE- 250/637: Performed by: FAMILY MEDICINE

## 2022-06-06 PROCEDURE — 74011250636 HC RX REV CODE- 250/636: Performed by: STUDENT IN AN ORGANIZED HEALTH CARE EDUCATION/TRAINING PROGRAM

## 2022-06-06 PROCEDURE — 74011250637 HC RX REV CODE- 250/637: Performed by: HOSPITALIST

## 2022-06-06 PROCEDURE — 82962 GLUCOSE BLOOD TEST: CPT

## 2022-06-06 PROCEDURE — 85025 COMPLETE CBC W/AUTO DIFF WBC: CPT

## 2022-06-06 PROCEDURE — 90935 HEMODIALYSIS ONE EVALUATION: CPT

## 2022-06-06 PROCEDURE — 83735 ASSAY OF MAGNESIUM: CPT

## 2022-06-06 PROCEDURE — 36415 COLL VENOUS BLD VENIPUNCTURE: CPT

## 2022-06-06 PROCEDURE — 74011250636 HC RX REV CODE- 250/636: Performed by: HOSPITALIST

## 2022-06-06 PROCEDURE — 84100 ASSAY OF PHOSPHORUS: CPT

## 2022-06-06 RX ADMIN — CARVEDILOL 12.5 MG: 12.5 TABLET, FILM COATED ORAL at 08:16

## 2022-06-06 RX ADMIN — FUROSEMIDE 80 MG: 40 TABLET ORAL at 08:16

## 2022-06-06 RX ADMIN — HEPARIN SODIUM 5000 UNITS: 5000 INJECTION INTRAVENOUS; SUBCUTANEOUS at 00:21

## 2022-06-06 RX ADMIN — SODIUM CHLORIDE, PRESERVATIVE FREE 10 ML: 5 INJECTION INTRAVENOUS at 06:44

## 2022-06-06 RX ADMIN — Medication 250 MG: at 21:52

## 2022-06-06 RX ADMIN — Medication 250 MG: at 08:16

## 2022-06-06 RX ADMIN — LOPERAMIDE HYDROCHLORIDE 2 MG: 2 CAPSULE ORAL at 05:16

## 2022-06-06 RX ADMIN — ISOSORBIDE MONONITRATE 60 MG: 60 TABLET, EXTENDED RELEASE ORAL at 08:16

## 2022-06-06 RX ADMIN — SEVELAMER CARBONATE 800 MG: 800 TABLET, FILM COATED ORAL at 11:07

## 2022-06-06 RX ADMIN — FUROSEMIDE 80 MG: 40 TABLET ORAL at 00:21

## 2022-06-06 RX ADMIN — ASPIRIN 81 MG: 81 TABLET, COATED ORAL at 08:16

## 2022-06-06 RX ADMIN — LOPERAMIDE HYDROCHLORIDE 2 MG: 2 CAPSULE ORAL at 11:07

## 2022-06-06 RX ADMIN — PANTOPRAZOLE SODIUM 40 MG: 40 TABLET, DELAYED RELEASE ORAL at 06:44

## 2022-06-06 RX ADMIN — FUROSEMIDE 80 MG: 40 TABLET ORAL at 21:52

## 2022-06-06 RX ADMIN — HEPARIN SODIUM 5000 UNITS: 5000 INJECTION INTRAVENOUS; SUBCUTANEOUS at 08:16

## 2022-06-06 RX ADMIN — LOPERAMIDE HYDROCHLORIDE 2 MG: 2 CAPSULE ORAL at 00:21

## 2022-06-06 RX ADMIN — EPOETIN ALFA-EPBX 20000 UNITS: 20000 INJECTION, SOLUTION INTRAVENOUS; SUBCUTANEOUS at 11:10

## 2022-06-06 RX ADMIN — SEVELAMER CARBONATE 800 MG: 800 TABLET, FILM COATED ORAL at 08:16

## 2022-06-06 RX ADMIN — SODIUM CHLORIDE, PRESERVATIVE FREE 5 ML: 5 INJECTION INTRAVENOUS at 14:00

## 2022-06-06 NOTE — PROGRESS NOTES
Patient:  Maco Kelley  :  1970  Gender:  male  MRN #:  [id number]    Assessment:   Maco Kelley is a 54 y.o. male with h/o ESRD on HD , non compliant with dialysis /skip multiple dialysis session every month despite knowing the risk, was brought due to change in mental status   Admitted for hyperkalemia , hypoglycemia and stroke work up. No stoke per neurology . Hemodynamically stable      # ESRD  # Uncontrolled Hypertension- Improved  # Hyperphosphate   # hyperkalemia - Improved   # Secondary Hyperparathyroidism     Plan:    Patient examined and labs reviewed this morning. 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 rate 18, height 5' 7\" (1.702 m), weight 81.4 kg (179 lb 6.4 oz), SpO2 97 %. Exam:      Lung: clear to auscultation  Abdomen: soft, non tender  Ext: no edema in LLE  RT BKA status   CNS- Oriented to time , place and person  [facility] does not have thrill and bruit      [unfilled]   Recent Labs     22  0110   WBC 9.0   HCT 21.8*   MCV 80.4     Recent Labs     22  0110      CO2 23   BUN 61*     No results for input(s): ALBUMIN, AGRAT in the last 72 hours.     No lab exists for component: TOTPR, SGOTAST, ALKPHOS, BILIT, BILID, SGPTALT, GLOBULIN    Medical Decision Making :    I have reviewed patient's record for pertinent labs, radiology and other test . I have reviewed old records. I have ordered labs, medications and radiology as necessary and indicated per patient's condition . Total time spent is approximately 26 minutes. Greater than 50 % of that time was spent in counseling and or care coordination.      Jose Chaves MD  Nephrology -[place] [id number]

## 2022-06-06 NOTE — OP NOTES
Fistulagram Procedure Note    Date of Service: 6/6/2022    Surgeon(s): Elías Dunn MD, RPVI      Pre-operative Diagnosis: Mechanical malfunction of arteriovenous access for dialysis, thrombosis, recent intervention at OSH. Post-operative Diagnosis: same as preop diagnosis    Procedure(s) Performed:    1. Ultrasound guided access of left arm brachial artery to axillary vein AVG, images stored for documentation purposes  2. Fistulagram of peripheral and central veins  3. Catheter selection of SVC,   4. Angioplasty of left subclavian vein with 10 mm balloon  5. Percutaneous thrombectomy graft with penumbra  6. Percutaneous angioplasty previously placed axillary vein stent, 8 mm and 10 mm balloon  7. Ultrasound-guided access of left arm AV graft in a retrograde fashion  8. Placement of right internal jugular vein tunneled dialysis catheter  5. Supervision and interpretation of all radiographic imaging    Anesthesia:  Local with 1% lidocaine. Moderate sedation was administered. The patient was monitored by an independent and qualified and trained RN/RCIS, Warren Garner RN,  observed under my direct supervision from 1430 until 1650 hours. Fentanyl 100 mcg, Versed 2 mg. Contrast: 30 ml   Heparin: 5694 units  Complications: none  Estimated Blood Loss:  minimal  Implants/Grafts:  none    Findings: Thrombosed brachial artery axillary vein graft. Stenosis within the axillary vein and subclavian vein. There is a previously placed stent in the axillary vein. There is a previously placed stent within the arterial anastomosis. The stent at the arterial anastomosis is fractured and infolded. This is not something that can be corrected endovascularly. Indication:   ESRD on HD via very dialysis catheter as his left arm graft is thrombosed. It was previously intervened upon at an outside facility within the past few weeks.   The decision was made to proceed with a percutaneous graft thrombectomy, angiogram, possible tunneled dialysis catheter placement. The risks and benefits of this procedure were discussed, including vessel damage and rupture, need for further intervention and risk of reoccurrence of the problem. The patient voiced understanding. All questions were answered and the patient opted to proceed. Description of procedure:   After the patient was identified in preoperative holding and it was ensured that the appropriately signed documents including history and physical and operative permit were secured on the chart, the patient was brought to the Cath Lab and placed on the Cath Lab table with the left arm abducted at 45 degrees. He received conscious sedation. he was monitored continuously with continuous cardiac monitoring and pulse oximetry. The arm was prepped and draped in usual sterile fashion. An appropriate timeout procedure was completed by all necessary personnel wherein the patient, procedure, site, positioning, allergies, equipment, and antibiotics were verified and agreed upon by all present prior to beginning. The access was inspected with ultrasound. This demonstrates a stent within the arterial inflow and within the outflow. The access site was anesthetized using 1% lidocaine just distal to the inflow stent. Micropuncture was used to access the graft under ultrasound through which a ClearMomentum wire was advanced over which micropuncture was exchanged for a 8 Western Dee sheath after dilating with a 6 Western Dee dilator. Wire and catheter were advanced through the access to the central vasculature were central venogram was obtained demonstrating patent SVC and innominate veins. There is stenosis within the subclavian vein, this is angioplastied after giving heparin. The previously placed stent is thrombosed. A penumbra device was used for percutaneous angioplasty. This was successful in clearing the outflow with the assistance of balloon angioplasty.   Second access was obtained under ultrasound just proximal to the second stent in a retrograde fashion. Wire and catheter were attempted to traverse the arterial inflow stent however this was not possible. Multiple angles were obtained with magnified views and this ultimately demonstrated a fractured stent that was in folded onto itself. As this is not something that can be corrected endovascularly, attention was turned to placing a tunneled dialysis catheter. The wires, catheters, and sheaths were removed and Monocryl sutures were placed at the access sites. Hemostasis was insured. Neck and chest were prepped and draped in usual sterile fashion along with the previously placed temporary dialysis catheter. The temporary dialysis catheter was amputated and a wire was inserted and confirmed with fluoroscopy to be within the superior vena cava. The old catheter was removed entirely. Peel-away sheath was inserted into the vein. Local anesthetic was used in the skin and subcutaneous tissues around the vein entrance site and for a subcutaneous tunnel. Small incisions was made in the chest and around the peel-away sheath. Catheter was then tunneled from the chest to the neck. Wire and dilator were removed and the catheter was inserted into the vein through the peel-away sheath and confirmed to rest at the cavoatrial junction without kinks or twists skin was reapproximated with Monocryl suture. The catheter was secured with nylon suture. Ports were aspirated and flushed well with heparinized saline and locked with heparin. Catheter was dressed per protocol. The patient tolerated the procedure well and was transported to the recovery area in stable condition. Discussed findings with Dr. Joseph Brito.       Niles Irwin MD, [address] [place] [address] [facility]  [address] [place] [address] [address] [facility], [address], [address] [facility]    [phone] pager  [phone] office  [phone] fax

## 2022-06-06 NOTE — PROGRESS NOTES
Hospitalist Progress Note-critical care note     Patient: Connie Dai MRN: [id number]  CSN: [id number]    YOB: 1970  Age: 54 y.o. Sex: male    DOA: 6/1/2022 LOS:  LOS: 5 days            Chief complaint: htn , hyperkalemia , av-fistular issue hypoglycemia     Assessment/Plan         Hospital Problems  Date Reviewed: 1/30/2020          Codes Class Noted POA    Hypertension ICD-10-CM: I10  ICD-9-CM: 401.9  6/1/2022 Unknown        AV fistula occlusion ([facility] [address] ICD-10-CM: T83.559L  ICD-9-CM: 996.74  6/1/2022 Unknown        * (Principal) Hyperkalemia ICD-10-CM: E87.5  ICD-9-CM: 276.7  8/27/2021 Unknown        Hypoglycemia ICD-10-CM: E16.2  ICD-9-CM: 251.2  1/30/2020 Unknown        ESRD (end stage renal disease) on dialysis [facility]) ICD-10-CM: N18.6, Z99.2  ICD-9-CM: 585.6, V45.11  1/29/2020 Unknown        Hx of BKA, right ([facility] [address] ICD-10-CM: Z89.511  ICD-9-CM: V49.75  1/29/2020 Yes               Hypertension , hypertensive urgency   Resolved and continue current regimen      chf   Continue current meds   Will have HD today        esrd on hd , av fistular occlusion   Will have HD today   Vascular consulted -unable to salvage LUE graft -need f/u as out -pt for other access evaluation   Current have TDC for HD       Anemia -due to esrd , no bleeding reported   H/h stable       Sickle cell trait        Hypoglycemia-recurrent episode-recommend to go to rehab-pt refused   Resolved now, continue hypoglycemia protocol,   follow FSG  Continue education   Will consult for hypoglycemic education        Acute encephalopathy, LOS   Like due to hypoglycemia.   CT head :Right central arden hypodensity, new from prior study  Mri no acute stroke   Neuro consulted            bkaudrey rt :fall precaution     Subjective: I do not want to go to rehab , my brother will figure out some body to help me     Will consult CM for help at home     Discussed win length for safe d/c planning, including rehab due to recurrent hypoglycemia/hyperkalemia-all life threatening condition, he declined to go to rehab, he said his brother is trying to hire somebody to help him. 35 total min's spent on patient care including >50% on counseling/coordinating care. Discussed the above assessments. also discussed labs, medications and hospital course      Disposition :tomorrow   Review of systems:    General: No fevers or chills. + tired   Cardiovascular: No chest pain or pressure. No palpitations. Pulmonary: No shortness of breath. Gastrointestinal: No nausea, vomiting. Vital signs/Intake and Output:  Visit Vitals  BP (!) 149/62   Pulse 78   Temp 97.9 °F (36.6 °C)   Resp 18   Ht 5' 7\" (1.702 m)   Wt 81.4 kg (179 lb 6.4 oz)   SpO2 97%   BMI 28.10 kg/m²     Current Shift:  06/06 0701 - 06/06 1900  In: 240 [P.O.:240]  Out: -   Last three shifts:  06/04 1901 - 06/06 0700  In: 1200 [P.O.:1200]  Out: 50 [Urine:50]    Physical Exam:  General: WD, WN. Alert, cooperative, no acute distress    HEENT: NC, Atraumatic. PERRLA, anicteric sclerae. Lungs: CTA Bilaterally. No Wheezing/Rhonchi/Rales. Heart:  Regular  rhythm,  No murmur, No Rubs, No Gallops  Abdomen: Soft, Non distended, Non tender. +Bowel sounds,   Extremities: No c/c/e, rt bka   Psych:   Not anxious or agitated. Neurologic:  No acute neurological deficit. Labs: Results:       Chemistry Recent Labs     06/06/22 0110 06/05/22  0155 06/04/22  0533   GLU 88 88 80    138 134*   K 5.6* 5.0 4.2    104 100   CO2 23 25 30   BUN 61* 43* 30*   CREA 9.00* 7.61* 5.56*   CA 8.0* 7.8* 7.8*   AGAP 7 9 4   BUCR 7* 6* 5*      CBC w/Diff Recent Labs     06/06/22  0110 06/05/22  0155 06/04/22  0533   WBC 9.0 9.6 5.4   RBC 2.71* 2.88* 3.17*   HGB 7.1* 7.6* 8.3*   HCT 21.8* 22.9* 24.8*    263 262   GRANS 73 79* 76*   LYMPH 10* 6* 8*   EOS 4 3 4      Cardiac Enzymes No results for input(s): CPK, CKND1, WILLIE in the last 72 hours.     No lab exists for component: Qian Swann Coagulation No results for input(s): PTP, INR, APTT, INREXT, INREXT in the last 72 hours. Lipid Panel Lab Results   Component Value Date/Time    Cholesterol, total 115 06/02/2022 09:45 AM    HDL Cholesterol 62 (H) 06/02/2022 09:45 AM    LDL, calculated 46 06/02/2022 09:45 AM    VLDL, calculated 7 06/02/2022 09:45 AM    Triglyceride 35 06/02/2022 09:45 AM    CHOL/HDL Ratio 1.9 06/02/2022 09:45 AM      BNP No results for input(s): BNPP in the last 72 hours. Liver Enzymes No results for input(s): TP, ALB, TBIL, AP in the last 72 hours. No lab exists for component: SGOT, GPT, DBIL   Thyroid Studies Lab Results   Component Value Date/Time    TSH 2.38 08/29/2021 01:20 AM        Procedures/imaging: see electronic medical records for all procedures/Xrays and details which were not copied into this note but were reviewed prior to creation of Plan    MRI BRAIN WO CONT    Result Date: 6/1/2022  Brain MR without contrast: Indication: Mental status change. Decreased level of consciousness. Hypoglycemia. Procedure: Sagittal spin echo T1, axial FSE FLAIR and T2 and axial diffusion weighted scanning was performed. An axial T2* scan optimized to detect hemosiderin and calcium was performed. Coronal spin echo T1and FSE T2 scans were also performed. No contrast was administered. Comparison exam: Head CT 6/1/2022. Brain MRI 8/31/2021. Findings: Diffusion: There are no areas of restricted diffusion, no acute infarction. The T2* scan shows no acute or chronic hemorrhage or abnormal calcifications. Brain parenchyma: Numerous bilateral right greater than left cerebellar hemisphere chronic areas of infarction are noted, unchanged from previous MRI. There are several chronic areas of infarction that are linear on the right and a single lesion on the left.  There is a right paracentral upper pontine chronic lacunar small vessel infarction that is new from the previous MRI, centrally fluid signal with some peripheral high signal FLAIR. Very low level ischemic changes parieto-occipital periventricular and deep white matter. No new focal supratentorial lesion, mass or mass effect. Ventricles and sulci: Top normal lateral ventricles particularly frontal horns. Mild prominence of sulci in the perisylvian region. No change from previous. Extra axial: No extra axial fluid collection or mass. Brain vasculature: Normal, no arterial vascular abnormality is noted. Craniocervical Junction: Normal. Extracranial and skull base:  Severe proptosis by imaging criteria likely from lipomatosis and shallow orbits, unchanged from previous. 1. No acute hemorrhage or acute infarction. 2. Numerous bilateral chronic right greater than left cerebellar hemisphere infarctions unchanged from previous. 3. Chronic right paramedian upper pontine lacunar small vessel infarction, new from previous MRI. 4. Mild atrophy and ischemic white matter changes. CT HEAD WO CONT    Result Date: 6/1/2022  EXAM: CT head INDICATION: Confusion. COMPARISON: 8/29/2021 TECHNIQUE: Axial CT imaging of the head was performed without intravenous contrast. Standard multiplanar coronal and sagittal reformatted images were obtained and are included in interpretation. One or more dose reduction techniques were used on this CT: automated exposure control, adjustment of the mAs and/or kVp according to patient size, and iterative reconstruction techniques. The specific techniques used on this CT exam have been documented in the patient's electronic medical record. Digital Imaging and Communications in Medicine (DICOM) format image data are available to nonaffiliated external healthcare facilities or entities on a secure, media free, reciprocally searchable basis with patient authorization for at least a 12-month period after this study. _______________ FINDINGS: BRAIN AND POSTERIOR FOSSA: Right central arden hypodensity, new from prior study.  No evidence of acute large vessel transcortical infarct or acute parenchymal hemorrhage. No midline shift or hydrocephalus. EXTRA-AXIAL SPACES AND MENINGES: There are no abnormal extra-axial fluid collections. CALVARIUM: Intact. SINUSES: Clear. OTHER: None. _______________     Right central arden hypodensity, new from prior study, age indeterminate infarct. Correlate clinically. CT ABD PELV WO CONT    Result Date: 5/16/2022  EXAM: CT of the abdomen and pelvis CLINICAL INDICATION/HISTORY: LLQ abd pain, constipation and anal pain   > Additional: None. COMPARISON: 09/01/2021   > Reference Exam: None. TECHNIQUE: Axial CT imaging of the abdomen and pelvis was performed without intravenous contrast. Oral contrast not administered. Multiplanar reformats were generated. Dose reduction techniques used: automated exposure control, adjustment of the mAs and/or kVp according to patient size, and iterative reconstruction techniques. Digital Imaging and Communications in Medicine (DICOM) format image data are available to nonaffiliated external healthcare facilities or entities on a secure, media free, reciprocally searchable basis with patient authorization for at least a 12-month period after this study. _______________ FINDINGS: LOWER CHEST: Lung bases are clear. Heart is mildly enlarged without pericardial effusion. LIVER, BILIARY: Liver is unremarkable. No biliary dilation. Gallbladder is markedly contracted but otherwise unremarkable. PANCREAS: Unremarkable. SPLEEN: Unremarkable. ADRENALS: Unremarkable. KIDNEYS/URETERS/BLADDER: No hydronephrosis. No calculi. LYMPH NODES: No enlarged lymph nodes. GASTROINTESTINAL TRACT: No bowel dilation or wall thickening. Colonic diverticulosis. PELVIC ORGANS: Unremarkable. VASCULATURE: Moderate to marked atherosclerosis. BONES: No acute or aggressive osseous abnormalities identified. OTHER: No ascites. _______________     1. No acute intra-abdominal abnormality. Colonic diverticulosis without acute diverticulitis.  * **  *    XR CHEST PORT    Result Date: 6/1/2022  EXAM: XR CHEST PORT CLINICAL INDICATION/HISTORY: Encephalopathy -Additional: None COMPARISON: 1/16/2022 TECHNIQUE: Portable frontal view of the chest _______________ FINDINGS: SUPPORT DEVICES: Interval placement of a right IJV vessels catheter with tip at the cavoatrial junction. HEART AND MEDIASTINUM: Cardiomegaly. LUNGS AND PLEURAL SPACES: Bilateral parenchymal/interstitial haziness. No large effusion or pneumothorax. _______________     Cardiomegaly with pulmonary edema. IR INSERT NON TUNL CVC OVER 5 YRS    Result Date: 6/1/2022  PROCEDURE: NON-TUNNELED DIALYSIS CATHETER PLACEMENT ATTENDING: Floresita Lawrence M.D. CONTRAST: None COMPLICATIONS: None MEDICATIONS: Local lidocaine. INDICATION: Renal failure requiring hemodialysis. PROCEDURE: Informed consent was obtained where the risks, benefits and alternatives to the procedure were explained. All elements of maximal sterile barrier technique followed including: cap and mask and sterile gown and sterile gloves and a large sterile sheet and hand hygiene and 2% chlorhexidine for cutaneous antisepsis (or acceptable alternative antiseptics, per current guidelines). Sterile ultrasound gel and a sterile cover for the US probe was used. After successfully identifying a patent right internal jugular vein, real-time ultrasound guidance demonstrated patent right internal jugular vein with normal waveforms. Then, real-time ultrasound guidance was used to puncture the right internal jugular vein. Permanent recording was created for the patient's record. Using this access, a 12 Fr dialysis catheter was placed under fluoroscopic guidance with its tip at the caval atrial junction. The catheter was secured to the skin with sutures, the ports heparinized, and a sterile dressing applied to the area. The catheter may be used immediately. The patient tolerated the procedure well and was transferred from the IR suite in stable condition. Fluoroscopic dose (reference air kerma): 9 mGy     Non-tunneled dialysis catheter placement performed, as discussed above.        Dottie Kate MD

## 2022-06-06 NOTE — PROGRESS NOTES
Problem: Pressure Injury - Risk of  Goal: *Prevention of pressure injury  Description: Document Tucker Scale and appropriate interventions in the flowsheet. Outcome: Progressing Towards Goal  Note: Pressure Injury Interventions:       Moisture Interventions: Absorbent underpads,Apply protective barrier, creams and emollients,Check for incontinence Q2 hours and as needed,Internal/External fecal devices,Maintain skin hydration (lotion/cream),Moisture barrier,Offer toileting Q_hr,Minimize layers    Activity Interventions: Assess need for specialty bed,Chair cushion,Increase time out of bed,Pressure redistribution bed/mattress(bed type),PT/OT evaluation    Mobility Interventions: HOB 30 degrees or less,Pressure redistribution bed/mattress (bed type)    Nutrition Interventions: Offer support with meals,snacks and hydration,Discuss nutritional consult with provider,Document food/fluid/supplement intake    Friction and Shear Interventions: Feet elevated on foot rest,Foam dressings/transparent film/skin sealants,Lift sheet,Lift team/patient mobility team,Minimize layers,Transferring/repositioning devices                Problem: Patient Education: Go to Patient Education Activity  Goal: Patient/Family Education  Outcome: Progressing Towards Goal     Problem: Falls - Risk of  Goal: *Absence of Falls  Description: Document Vanessa Fall Risk and appropriate interventions in the flowsheet.   Outcome: Progressing Towards Goal  Note: Fall Risk Interventions:  Mobility Interventions: Bed/chair exit alarm,Communicate number of staff needed for ambulation/transfer,OT consult for ADLs,Patient to call before getting OOB,PT Consult for mobility concerns,PT Consult for assist device competence,Strengthening exercises (ROM-active/passive),Utilize walker, cane, or other assistive device         Medication Interventions: Bed/chair exit alarm,Evaluate medications/consider consulting pharmacy,Patient to call before getting OOB,Teach patient to arise slowly    Elimination Interventions: Bed/chair exit alarm,Call light in reach,Elevated toilet seat,Patient to call for help with toileting needs,Toileting schedule/hourly rounds,Toilet paper/wipes in reach,Stay With Me (per policy),Urinal in reach (dialysis)    History of Falls Interventions: Bed/chair exit alarm,Door open when patient unattended,Evaluate medications/consider consulting pharmacy,Assess for delayed presentation/identification of injury for 48 hrs (comment for end date),Vital signs minimum Q4HRs X 24 hrs (comment for end date)         Problem: Patient Education: Go to Patient Education Activity  Goal: Patient/Family Education  Outcome: Progressing Towards Goal     Problem: Chronic Renal Failure  Goal: *Fluid and electrolytes stabilized  Outcome: Progressing Towards Goal     Problem: Patient Education: Go to Patient Education Activity  Goal: Patient/Family Education  Outcome: Progressing Towards Goal     Problem: Risk for Spread of Infection  Goal: Prevent transmission of infectious organism to others  Description: Prevent the transmission of infectious organisms to other patients, staff members, and visitors.   Outcome: Progressing Towards Goal     Problem: Patient Education:  Go to Education Activity  Goal: Patient/Family Education  Outcome: Progressing Towards Goal

## 2022-06-06 NOTE — PROGRESS NOTES
0730: report givwen to this nurse from Nadeem FARFAN    [id number]: dialysis in progress  COLTON Baptiste RN

## 2022-06-06 NOTE — ROUTINE PROCESS
Bedside shift change report given to Perla Keyes RN (oncoming nurse) by Azeb Erickson RN (offgoing nurse). Report included the following information SBAR, Procedure Summary, Intake/Output, MAR and Recent Results.

## 2022-06-06 NOTE — PROGRESS NOTES
TREATMENT SUMMARY     Patient in room 332 dialyzed for 3.5 hours. Tolerated tx well with without complaint or complications. Right Subclavian CVC functioning without complication accessing or BFR   mL/min   mL/min  3500 ml UF removed with a net UF removal of 3000 ml. Report given to Wesley Slaughter RN with all questions answered. TREATMENT NOTES                                                                                                ACUTE HEMODIALYSIS FLOW SHEET       PATIENT INFORMATION   [address]       DR. Alston    []1st Time Acute  []Stat[x] Routine []Urgent []Chronic Unit   []Acute Room [x]Bedside  []ICU/CCU []ER   Isolation Precautions: [x]Dialysis[] Airborne []Contact []Droplet []Reverse    Special Considerations:_______  [] Blood Consent Verified  [x]N/A   Allergies:[] NKA                 [x] _Penicillins PenicillinsRashNot Wvdkumwko33/22/2020Deletion Reason: Vancomycin VancomycinOther (comments)Not Specified1/29/2020redman syndrome____________ Code Status [x]Full Code [] DNR  [] Other_____   Diet: [x] Renal [] NPO [] Diabetic   [] Enteral Feeding [] Cardiac Diabetic: [x]Yes []No     [x]Signed Treatment Consent Verified   [x] Time Out/ Safety Check   PRIMARY NURSE REPORT: FIRST INITIAL/ LAST NAME/TITLE  PRE DIALYSIS:     Susana Garcia RN                                       TIME: 36   ACCESS   CATHETER ACCESS: [] N/A  [x] RIGHT  [] LEFT  [] IJ  [x] SUBCL [] FEM                    [] First use X-ray  [x] Tunnel     [] Non-Tunneled      [x] No S/S infection  [] Redness [] Drainage  [] Cultured [] Swelling [] Pain                    [x] Medical Aseptic [] Prep Dressing Changed                  [] Clotted [x] Patent []      Flows: [x] Good [] Poor [] Reversed                 If Access Problem Dr. Brenda Lopez: [] Yes [] No    Date:_____  [] N/A[]   GRAFT/FISTULA ACCESS:  [x] N/A  [] RIGHT  [] LEFT  [] UE   [] LE       [] AVG  [] AVF [] BUTTONHOLE    [] +BRUIT/THRILL [] MEDICAL ASEPTIC PREP     [] No S/S infection  [] Redness [] Drainage  [] Cultured [] Swelling [] Pain              If Access Problem Dr. Sara Fish: [] Yes [] No    Date:______ [] N/A   GENERAL ASSESSMENT   LUNGS:  SaO2% _97___ [x] Clear [] Coarse [] Crackles [] Wheezing               [] Diminished Location: [] RLL [] LLL [] RUL [] KYLAH    COUGH:  [] Productive  [] Loose[] Dry [x] N/A  RESPIRATIONS: [x] Easy [] Labored    THERAPY: [x] RA   [] NC _____. L/min    Mask: [] NRB [] Venti  _____O2%                  [] Ventilator [] Intubated [] Trach [] BiPap [] CPap [] HI Flow   CARDIAC: [x] Regular [] Irregular [] Pericardial Rub [] JVD               Monitored Rhythm:______ [] N/A   EDEMA: [x] None [] Generalized [] Facial [] Pedal [] UE [] LE             [] Pitting [] 1 [] 2 [] 3 [] 4    [] Right [] Left [] Bilateral   SKIN:    [x] Warm [] Hot [] Cold  [x] Dry [] Pale [] Diaphoretic              [] Flushed [] Jaundiced [] Cyanotic [] Rash [] Weeping     LOC:    [x] Alert  Oriented to: [x] Person [x] Place [x] Time             [] Confused [] Lethargic [] Medicated [] Non-responsive    GI/ABDOMEN: [] Flat [] Distended [] Soft [x] Firm [] Diarrhea [x] Bowel Sounds Present [] Nausea [] Vomiting    PAIN: [x] 0 [] 1 [] 2 [] 3 [] 4 [] 5 [] 6 [] 7 [] 8 [] 9 [] 10          Scale 1-10 Action/Follow Up_____   MOBILITY: [] Amb [] Amb/Assist [x] Bed  [] Wheelchair    CURRENT LABS   HBsAg ONLY: Date Drawn:  06/01/2022           [x] Negative [] Positive [] Unknown.      HBsAb: Date Drawn:    06/01/2022          [x] Susceptible <10 [x] Immune ?10 [] Unknown   Date of Current Labs:  ATTACHED     EDUCATION   Person Educated: [x] Patient [] Other_________   Knowledge base: [] None [x] Minimal [] Substantial    Barriers to learning  [x] None _______________   Preferred method of learning: [] Written [x] Oral [x] Visual [] Hands on    Topic: [x] Access Care [x] S&S of infection [x] Fluid Management   [] K+  [x] Procedural  [] Albumin [] Medications [] Tx Options   [] Transplant [x] Diet [] Other    Teaching Tools: [x] Explain [] Demonstration [] Handout_____ [] Video______  CARE PLAN    [x] Renal Failure (Adult)  Interdisciplinary  · Fluid and electrolytes stabilized  ? Interventions  · Dehydration signs and symptoms (eg: Weight/lab monitoring; vomiting/diarrhea/urine; tenting; mucous membranes; dizziness/lethargy/irritability/confusion; weak pulse; tachycardia; blood pressure; I&O)  · Fluid overload signs and symptoms assessment (eg: Body weight increased; dyspnea; edema; hypertension; respiratory crackles/wheezing; JVD; lab monitoring; mental status changes; I&O)  · Monitor appropriate lab values  · COMPLIANCE WITH PRESCRIBED THERAPY  · ARTERIAL ACCESS SITE ASSESSMENT  · NUTRITION SCREENING  · Vital signs monitoring per assessed patient condition or unit standard  · Cardiac monitoring  · Hydration management  · Intake and output measurement  · Body weight monitoring  · Skin care  · DIALYSIS  · Nutrition Care Process per nutrition screen  · Oral hygiene care every 2 hours  · Pain management     · Outcome   ? [x] Progressing Towards Goal  ? [] Not Progressing Towards Goals  ? [] Goals Met/Resolved  ? [] Goals Not Met/ Resolved        · Patient/ Family Education  ? Progressing Towards Goals          RO/HEMODIAYLSIS MACHINE SAFETY CHECKS- BEFORE EACH TREATMENT                        [x] THE UVALDO [facility]: Machine Serial #2:  [id number]   RO Serial #2: [id number]         Alarm Test: [x] Pass  Time_1654_______  [x] RO/Machine Log Complete    [x] Extracorporeal circuit Tested for integrity           Dialyzer_[id number]_________   Tubing___18C14-9_________    Dialysate: pH_7.4__  Temp. _36___Conductivity: Meter _14___ HD Machine_14__   CHLORINE TESTING- BEFORE EACH TREATMENT AND EVERY 4 HOURS   Total Chlorine: [x] Less than 0.1 ppm Time:_1654____2nd Check Time: N/A______  (If greater than 0.1 ppm from Primary then every 30 minutes from Secondary)   TREATMENT INIATION-WITH DIALYSIS PRECAUTIONS   [x] All Connections Secured   [x] Saline Line Double Clamped    [x] Venous Parameters Set [x] Arterial Parameters Set    [x] Prime Given 250 ml     [x] Air Foam Detector Engaged   PRE-TREATMENT   UF Calculations: Wt to lose: 3000____ml(+) Oral:__ml(+)IV Meds/Fluids/Blood prods___ml(+) Prime/Rinse_500__ml(=)Total UF Goal__3500__mL   Scale Type:[x] Bed scale [] Sling Scale [] Wheel Chair Scale []  Not Ordered [] [] Unable to obtain pt on stretcher/ bed scale malfunctioning   Tx Initiation Note:  Tx Initiation Note: Received  patient in bed semi fowlers position. Right IJ subclavian CVC remains intact. No s/s of infection noted. Dressing to same in place. Treatment initiated as per MD order without incident. Plan to remove 3 liters for 3.5 hours while monitoring patient's well-being and vital signs. [x] Time Out/Safety Check  Time:_1700____   INTRADIALYTIC MONITORING  (SEE ATTACHED FLOWSHEET)     POST TREATMENT    Time Medication Dose Volume Route    Initials                                           [facility] Signatures Title Initials Time                     Dialyzer cleared: [] Good [x] Fair [] Poor     Blood Processed 77.0___Liters    Net UF Removed __3000__mL  Post Tx Access:                  AVF/AVG: Bleeding Stop       Art.___min Chaim.____min []+bruit/thrill                Catheter: Locking Solution [x] Heparin 1 ml/1000 units  [] Normal Saline                                                                               Art.__1.6___ ml Chaim._1.6____ml  Post Assessment:              Skin: [x]Warm [x]Dry []Diaphoretic []Flushed [] Pale []Cyanotic            Lungs: [x]Clear []Coarse []Crackles []Wheezing             Cardiac: [x]Regular []Irregular  []Monitored rhythm____ []N/A            Edema: [x]None []General []Facial []Pedal  []UE []LE []RIGHT []LEFT            Pain: [x]0 []1 []2 []3 []4 []5 []6 []7 []8 []9 []10   POST Tx Note:  HD treatment completed and tolerated well. Patient rinsed with 250 mL 0.9% N/S. 1.6 mL heparin instilled in arterial lumen, 1.6 mL instilled in venous lumen of Right  Subclavian CVC. Caps applied to lumen ends securely. Patient remains in  bed 332 in stable condition. O2 Sat 98%. No acute complaints or distress noted.             Primary Nurse Report: First initial/Last name/Title    Post Dialysis:__Ernestina North RN ________________________         Time:_____2100___________    Abbreviations: AVG-arterial venous graft, AVF-arterial venous fistula, IJ-Internal Jugular,  Subcl-Subclavian, Fem-Femoral, Tx-treatment, AP/HR-apical heart rate, DFR-dialysate flow rate, BFR-blood flow rate, AP-arterial pressure, -venous pressure, UF-ultrafiltrate, TMP-transmembrane pressure, Chaim-Venous, Art-Arterial, RO-Reverse Osmosis

## 2022-06-06 NOTE — PROGRESS NOTES
Chart reviewed and spoke with pt at bedside to discuss d/c planning after introducing self, when discharged pt plans to return back home by medical transport  If patients brother is unavailable due to work schedule. Pt informed cm that his brother  Whom is involved with patients care  will be assisting him with making personal care arrangements once he returns home, FOC explained and offered to pt, pt selected [facility], after conversation cm spoke with patients brother Alberto Wagoner [phone] via phone conversation, patients brother does work full time managing a [employer], plans to call patients insurance for Personal care services, brother aware that H/H has been ordered along with personal care for bathing,cm also plans to inform H/H agency that pt is also seeking personal care aide to see if they can assist also, pt and brother did inform cm that patients apartment keys are in his apartment, pt will need to arrive at least by 4pm at [facility]  to have his apartment unlocked by his rental office, at this time cm waiting for response from [facility] .

## 2022-06-07 VITALS
DIASTOLIC BLOOD PRESSURE: 60 MMHG | SYSTOLIC BLOOD PRESSURE: 153 MMHG | HEART RATE: 81 BPM | TEMPERATURE: 98.3 F | BODY MASS INDEX: 27.33 KG/M2 | HEIGHT: 67 IN | RESPIRATION RATE: 18 BRPM | OXYGEN SATURATION: 99 % | WEIGHT: 174.1 LBS

## 2022-06-07 LAB
GLUCOSE BLD STRIP.AUTO-MCNC: 82 MG/DL (ref 70–110)
GLUCOSE BLD STRIP.AUTO-MCNC: 88 MG/DL (ref 70–110)
LEFT ARTERIAL PROX ANASTOMOSIS AVF PSV: 25.4 CM/S
LEFT AVF AVG OUTFLOW VESSEL NAME: NORMAL
LEFT DIST OUTFLOW AVF PSV: 0 CM/S
LEFT INFLOW ARTERY AVF PSV: 61.9 CM/S
LEFT MID OUTFLOW AVF PSV: 0 CM/S
LEFT PROX OUTFLOW AVF PSV: 0 CM/S
LEFT VENOUS DIST ANASTOMOSIS AVF PSV: 0 CM/S
MAGNESIUM SERPL-MCNC: 2.4 MG/DL (ref 1.6–2.6)
PHOSPHATE SERPL-MCNC: 4.1 MG/DL (ref 2.5–4.9)

## 2022-06-07 PROCEDURE — 74011250637 HC RX REV CODE- 250/637: Performed by: HOSPITALIST

## 2022-06-07 PROCEDURE — 84100 ASSAY OF PHOSPHORUS: CPT

## 2022-06-07 PROCEDURE — 74011250636 HC RX REV CODE- 250/636: Performed by: HOSPITALIST

## 2022-06-07 PROCEDURE — 82962 GLUCOSE BLOOD TEST: CPT

## 2022-06-07 PROCEDURE — 83735 ASSAY OF MAGNESIUM: CPT

## 2022-06-07 PROCEDURE — 74011250637 HC RX REV CODE- 250/637: Performed by: INTERNAL MEDICINE

## 2022-06-07 PROCEDURE — 36415 COLL VENOUS BLD VENIPUNCTURE: CPT

## 2022-06-07 PROCEDURE — 74011250637 HC RX REV CODE- 250/637: Performed by: FAMILY MEDICINE

## 2022-06-07 RX ORDER — LANOLIN ALCOHOL/MO/W.PET/CERES
1000 CREAM (GRAM) TOPICAL DAILY
Qty: 30 TABLET | Refills: 0 | Status: SHIPPED | OUTPATIENT
Start: 2022-06-07

## 2022-06-07 RX ORDER — ISOSORBIDE MONONITRATE 60 MG/1
60 TABLET, EXTENDED RELEASE ORAL DAILY
Qty: 30 TABLET | Refills: 0 | Status: SHIPPED | OUTPATIENT
Start: 2022-06-07

## 2022-06-07 RX ORDER — FUROSEMIDE 80 MG/1
80 TABLET ORAL 2 TIMES DAILY
Qty: 60 TABLET | Refills: 0 | Status: SHIPPED | OUTPATIENT
Start: 2022-06-07 | End: 2022-07-05

## 2022-06-07 RX ORDER — LANOLIN ALCOHOL/MO/W.PET/CERES
1000 CREAM (GRAM) TOPICAL DAILY
Status: DISCONTINUED | OUTPATIENT
Start: 2022-06-07 | End: 2022-06-07 | Stop reason: HOSPADM

## 2022-06-07 RX ORDER — CARVEDILOL 12.5 MG/1
12.5 TABLET ORAL 2 TIMES DAILY WITH MEALS
Qty: 60 TABLET | Refills: 0 | Status: SHIPPED | OUTPATIENT
Start: 2022-06-07 | End: 2022-07-07

## 2022-06-07 RX ADMIN — SEVELAMER CARBONATE 800 MG: 800 TABLET, FILM COATED ORAL at 12:01

## 2022-06-07 RX ADMIN — FUROSEMIDE 80 MG: 40 TABLET ORAL at 08:38

## 2022-06-07 RX ADMIN — SEVELAMER CARBONATE 800 MG: 800 TABLET, FILM COATED ORAL at 08:37

## 2022-06-07 RX ADMIN — ASPIRIN 81 MG: 81 TABLET, COATED ORAL at 08:38

## 2022-06-07 RX ADMIN — Medication 250 MG: at 08:37

## 2022-06-07 RX ADMIN — ISOSORBIDE MONONITRATE 60 MG: 60 TABLET, EXTENDED RELEASE ORAL at 08:38

## 2022-06-07 RX ADMIN — HEPARIN SODIUM 5000 UNITS: 5000 INJECTION INTRAVENOUS; SUBCUTANEOUS at 00:59

## 2022-06-07 RX ADMIN — PANTOPRAZOLE SODIUM 40 MG: 40 TABLET, DELAYED RELEASE ORAL at 06:59

## 2022-06-07 RX ADMIN — CARVEDILOL 12.5 MG: 12.5 TABLET, FILM COATED ORAL at 08:38

## 2022-06-07 RX ADMIN — HEPARIN SODIUM 5000 UNITS: 5000 INJECTION INTRAVENOUS; SUBCUTANEOUS at 08:37

## 2022-06-07 RX ADMIN — CYANOCOBALAMIN TAB 1000 MCG 1000 MCG: 1000 TAB at 12:01

## 2022-06-07 RX ADMIN — LOPERAMIDE HYDROCHLORIDE 2 MG: 2 CAPSULE ORAL at 08:54

## 2022-06-07 NOTE — DIABETES MGMT
Diabetes Patient/Family Education Record    Factors That May Influence Patients Ability to Learn or Comply with Recommendations   []   Language barrier    []   Cultural needs   []   Motivation    []   Cognitive limitation    []   Physical   []   Education    []   Physiological factors   []   Hearing/vision/speaking impairment   []   Religion beliefs    []   Financial factors   []  Other:   [x]  No factors identified at this time. Person Instructed:   [x]   Patient   []   Family   []  Other     Preference for Learning:   [x]   Verbal   []   Written   []  Demonstration     Level of Comprehension & Competence:    [x]  Good                                      [] Fair                                     []  Poor                             []  Needs Reinforcement   []  Teach back completed    Education Component: Consult received for hypoglycemia education. Reviewed with patient common symptoms of low blood sugar. He reports he no longer monitor his BG but states he is always able to feel when his blood sugar is low. Patient reports typically needing a bedtime snack to prevent low early morning blood sugars. He is encouraged to eat small frequent meals every few hours to maintain blood sugar. Reviewed snacks and meals that incorporate carbs + fat/protein to maintain blood sugars.     []  Medication management, including how to administer insulin (if appropriate) and potential medication interactions    []  Nutritional management - [] Obtained usual meal pattern   []   Basic carbohydrate counting  []  Plate method  []  Limit concentrated sweets and avoid sweetened beverages  []  Portion control  []    Avoid skipping meals   []  Exercise   [x]  Signs, symptoms, and treatment of hyperglycemia and hypoglycemia   [x] Prevention, recognition and treatment of hyperglycemia and hypoglycemia   []  Importance of blood glucose monitoring  [] Blood Glucose targets   [] Provided patient with blood glucose meter  []  Has glucometer and supplies at home   []  Instruction on use of the blood glucose meter and recommended monitoring schedule   []  Discuss the importance of HbA1C monitoring. Patients A1c is ___ %.  This is equivalent to average glucose of ___ mg/dl for the past 2-3 months.   []  Sick day guidelines   []  Proper use and disposal of lancets, needles, syringes or insulin pens (if appropriate)   []  Potential long-term complications (retinopathy, kidney disease, neuropathy, foot care)   [] Information about whom to contact in case of emergency or for more information    []  Goal:  Patient/family will demonstrate understanding of Diabetes Self- Management Skills by: (date) ___6/14____  Plan for post-discharge education or self-management support:    [] Outpatient class schedule provided            [] Patient Declined    [] Scheduled for outpatient classes (date) _______    [] Written information provided  Verify: [] Prior to admission Diabetes medications    Does patient understand how diabetes medications work? ____________________________  Does patient have difficulty obtaining diabetes medications or testing supplies? _________________       Emily Crawford RD  Glycemic Control Team  [phone]    Monday-Friday   9 am - 3 pm

## 2022-06-07 NOTE — PROGRESS NOTES
D/C plan: Home w/ [facility] via [facility]. CM spoke w/ pt's brother via phone and met w/ pt at bedside. Pt's brother: Kenton Wright states the Apartment Complex will tape the pt's key in envelope to the pt's door so that he can get in. The pt states he arranges his own transportation to dialysis and will resume that himself upon d/c. Discussed HH w/ the pt. Advised him that [facility] doesn't have aides available. Pt is in agreement w/ d/c w/ [facility]. Referral sent. Pt's brother is reaching out to the pt's CM w/ his Medicaid to get personal care resumed in the home. Care Management Interventions  PCP Verified by CM:  Yes  Palliative Care Criteria Met (RRAT>21 & CHF Dx)?: No  Mode of Transport at Discharge: BLS (Medicaid BLS)  Transition of Care Consult (CM Consult): [address]: No  Reason Outside [place]: Unable to staff case  Discharge Durable Medical Equipment: No  Physical Therapy Consult: No  Occupational Therapy Consult: No  Speech Therapy Consult: No  Support Systems:  (Brother)  Confirm Follow Up Transport:  (Stretcher transport. )  The Plan for Transition of Care is Related to the Following Treatment Goals : home w/ [facility]  The Patient and/or Patient Representative was Provided with a Choice of Provider and Agrees with the Discharge Plan?: Yes (The pt is alert and oriented. )  Freedom of Choice List was Provided with Basic Dialogue that Supports the Patient's Individualized Plan of Care/Goals, Treatment Preferences and Shares the Quality Data Associated with the Providers?: Yes [facility] )  Discharge Location  Patient Expects to be Discharged to[de-identified] Home with home health

## 2022-06-07 NOTE — PROGRESS NOTES
Patient:  Candace Davis  :  1970  Gender:  male  MRN #:  [id number]    Assessment:   Candace Davis is a 54 y.o. male with h/o ESRD on HD , non compliant with dialysis /skip multiple dialysis session every month despite knowing the risk, was brought due to change in mental status   Admitted for hyperkalemia , hypoglycemia and stroke work up. No stoke per neurology . Hemodynamically stable      # ESRD  # Uncontrolled Hypertension- Improved  # Hyperphosphate   # hyperkalemia - Improved   # Secondary Hyperparathyroidism     Plan:    Patient examined and labs reviewed this morning. No clinical indication of dialysis today . Next dialysis on Wednesday  , advise on low potassium diet   - Appreciate vascular assistance, unable to salvage LUE graft , had TDC f/u with vascular as outpatient for new access     - Dose all meds for ESRD    BMD- has secondary Hyperparathyroidism  Continue  calcitriol 1 mcg MWF  sevelamer for phos binders       Uncontrolled Hypertension: BP is at target range with some fluctuation/mostly stable   For now continue coreg 12.5 mg BID only , he was not adhering to home medicine   Still has residual renal function so will continue lasix   I advise him to monitor BP at home and resume amlodipine if BP > 140 mmhg     Anemia of CKD- Retacrit during HD     Subjective/Interval Events    Says he is good , denied chest pain, diarrhea  and SOB   No other symptoms and concern reported       Blood pressure (!) 153/60, pulse 81, temperature 98.3 °F (36.8 °C), resp. rate 18, height 5' 7\" (1.702 m), weight 79 kg (174 lb 1.6 oz), SpO2 99 %.     Exam:      Lung: clear to auscultation  Abdomen: soft, non tender  Ext: no edema in LLE  RT BKA status   CNS- Oriented to time , place and person  [facility] does not have thrill and bruit      [unfilled]   Recent Labs     22  0110   WBC 9.0   HCT 21.8*   MCV 80.4     Recent Labs     22  0110      CO2 23   BUN 61*     No results for input(s): ALBUMIN, AGRAT in the last 72 hours. No lab exists for component: TOTPR, SGOTAST, ALKPHOS, BILIT, BILID, SGPTALT, GLOBULIN    Medical Decision Making :    I have reviewed patient's record for pertinent labs, radiology and other test . I have reviewed old records. I have ordered labs, medications and radiology as necessary and indicated per patient's condition . Total time spent is approximately 25 minutes. Greater than 50 % of that time was spent in counseling and or care coordination.      Chrystal Gonzalez MD  Nephrology -[facility], [facility].

## 2022-06-07 NOTE — PROGRESS NOTES
Problem: Pressure Injury - Risk of  Goal: *Prevention of pressure injury  Description: Document Tucker Scale and appropriate interventions in the flowsheet. Outcome: Progressing Towards Goal  Note: Pressure Injury Interventions:       Moisture Interventions: Absorbent underpads    Activity Interventions: Pressure redistribution bed/mattress(bed type)    Mobility Interventions: Pressure redistribution bed/mattress (bed type)    Nutrition Interventions: Document food/fluid/supplement intake,Offer support with meals,snacks and hydration    Friction and Shear Interventions: HOB 30 degrees or less                Problem: Falls - Risk of  Goal: *Absence of Falls  Description: Document Vanessa Fall Risk and appropriate interventions in the flowsheet. Outcome: Progressing Towards Goal  Note: Fall Risk Interventions:  Mobility Interventions: Assess mobility with egress test         Medication Interventions: Teach patient to arise slowly    Elimination Interventions: Call light in reach    History of Falls Interventions: Evaluate medications/consider consulting pharmacy         Problem: Patient Education: Go to Patient Education Activity  Goal: Patient/Family Education  Outcome: Progressing Towards Goal     Problem: Patient Education: Go to Patient Education Activity  Goal: Patient/Family Education  Outcome: Progressing Towards Goal     Problem: Chronic Renal Failure  Goal: *Fluid and electrolytes stabilized  Outcome: Progressing Towards Goal     Problem: Patient Education: Go to Patient Education Activity  Goal: Patient/Family Education  Outcome: Progressing Towards Goal     Problem: Risk for Spread of Infection  Goal: Prevent transmission of infectious organism to others  Description: Prevent the transmission of infectious organisms to other patients, staff members, and visitors.   Outcome: Progressing Towards Goal

## 2022-06-07 NOTE — PROGRESS NOTES
Bedside and Verbal shift change report given to Inna Madison RN (oncoming nurse) by Mayela Blanco RN (offgoing nurse). Report included the following information SBAR, Kardex, ED Summary, Intake/Output, MAR, Recent Results, Med Rec Status and Cardiac Rhythm NSR.       1946  Shift assessment complete  Pt resting quietly with eyes open and chest rising and falling evenly  No c/o pain or signs of distress  Bed locked and in lowest position call light within [address] Cardiac/Medical Night Shift Chart Audit    Chart Audit completed? YES        Bedside and Verbal shift change report given to Marilin Rosenberg RN (oncoming nurse) by Inna Madison RN (offgoing nurse). Report included the following information SBAR, Kardex, ED Summary, Intake/Output, MAR, Recent Results, Med Rec Status and Cardiac Rhythm NSR.

## 2022-06-07 NOTE — DISCHARGE SUMMARY
Discharge Summary    Patient: Floresita Cao MRN: [id number]  CSN: [id number]    YOB: 1970  Age: 54 y.o. Sex: male    DOA: 6/1/2022 LOS:  LOS: 6 days   Discharge Date:      Primary Care Provider:  Lelia Voss MD    Admission Diagnoses: Hyperkalemia [E87.5]  Hypertension [I10]  ESRD (end stage renal disease) on dialysis ([facility] [N18.6, Z99.2]  AV fistula occlusion ([facility] [T82.898A]  Hypoglycemia [E16.2]    Discharge Diagnoses:    Hospital Problems  Date Reviewed: 1/30/2020          Codes Class Noted POA    Hypertension ICD-10-CM: I10  ICD-9-CM: 401.9  6/1/2022 Unknown        AV fistula occlusion ([facility] ICD-10-CM: E00.529R  ICD-9-CM: 996.74  6/1/2022 Unknown        * (Principal) Hyperkalemia ICD-10-CM: E87.5  ICD-9-CM: 276.7  8/27/2021 Unknown        Hypoglycemia ICD-10-CM: E16.2  ICD-9-CM: 251.2  1/30/2020 Unknown        ESRD (end stage renal disease) on dialysis [facility]) ICD-10-CM: N18.6, Z99.2  ICD-9-CM: 585.6, V45.11  1/29/2020 Unknown        Hx of BKA, right ([facility] ICD-10-CM: Z89.511  ICD-9-CM: V49.75  1/29/2020 Yes              Discharge Condition: stable     Discharge Medications:     Current Discharge Medication List      CONTINUE these medications which have CHANGED    Details   carvediloL (COREG) 12.5 mg tablet Take 1 Tablet by mouth two (2) times daily (with meals) for 30 days. Qty: 60 Tablet, Refills: 0  Start date: 6/7/2022, End date: 7/7/2022      furosemide (Lasix) 80 mg tablet Take 1 Tablet by mouth two (2) times a day. Qty: 60 Tablet, Refills: 0  Start date: 6/7/2022      isosorbide mononitrate ER (IMDUR) 60 mg CR tablet Take 1 Tablet by mouth daily. Qty: 30 Tablet, Refills: 0  Start date: 6/7/2022         CONTINUE these medications which have NOT CHANGED    Details   aspirin delayed-release 81 mg tablet Take 1 Tablet by mouth daily. Qty: 30 Tablet, Refills: 0      doxazosin (CARDURA) 4 mg tablet Take 2 mg by mouth daily.       sodium hypochlorite 0.0125% (DAKINS SOLUTION) Apply 1 mL to affected area two (2) times a week. omeprazole (PRILOSEC) 40 mg capsule Take 40 mg by mouth daily. sevelamer carbonate (RENVELA) 800 mg tab tab Take 800 mg by mouth three (3) times daily (with meals). sodium bicarbonate 650 mg tablet Take 650 mg by mouth three (3) times daily. STOP taking these medications       amLODIPine (NORVASC) 10 mg tablet Comments:   Reason for Stopping:         hydrALAZINE (APRESOLINE) 25 mg tablet Comments:   Reason for Stopping:               Procedures : TDC     Consults: Nephrology, Neurology and Pulmonary/Critical Care      PHYSICAL EXAM   Visit Vitals  BP (!) 150/53 (BP Patient Position: Lying)   Pulse 85   Temp 98.4 °F (36.9 °C)   Resp 18   Ht 5' 7\" (1.702 m)   Wt 79 kg (174 lb 1.6 oz)   SpO2 97%   BMI 27.27 kg/m²     General: Awake, cooperative, no acute distress    HEENT: NC, Atraumatic. PERRLA, EOMI. Anicteric sclerae. Lungs:  CTA Bilaterally. No Wheezing/Rhonchi/Rales. Heart:  Regular  rhythm,  No murmur, No Rubs, No Gallops  Abdomen: Soft, Non distended, Non tender. +Bowel sounds,   Extremities: No c/c. Rt bka   Psych:   Not anxious or agitated. Neurologic:  No acute neurological deficits. Admission HPI :     Mario Martines is a 54 y.o. male with end-stage renal disease on HD, diabetes, hypertension, right BKA presented to ER due to mental status changes. Patient was found  severe hypoglycemia at home, his glucose was 30s per EMS reported, he received oral glucose, and glucagon IM per EMS before sending to ER. His glucose was 42 in ER, he received D10, he becomes more responsiveness. Patient reported he had similar episode before, he usually ate some hamburger before going to bed. He did not eat anything last night. He felt weakness, fatigue and  dizziness this morning. He could not remember if he lost his consciousness or not. He also reported he did not miss HD.   He went to HD center, he was told his AV fistula was not able to be accessed. He was recommended to go to ER to fix it. In ER he was found hyperkalemia 6.2, cr/bun 135/ 17. Av-fistular indicated occlusion. Having difficulty for iv access in ER and tried to put midline per ER physician ordered. Nephrologist is consulted and urgent consult IR for hd access. Pt was seen and examed in ER, ct head still pending at that time. Reported received 3 dose covid 19 vaccines, not on insulin/or medication for his DM. His bp was elevated in ER-not taking medication. His brother  Checked him today. Hospital Course :  Karine Kinney is a 54 y.o. male with end-stage renal disease on HD, diabetes, hypertension, right BKA presented to ER due to mental status changes and hyperkalemia. He was admitted to icu for hyperkalemia. He received urgent HD and his potassium monitoring closely. His hypoglycemia resolved and mri brain done with multiple chronic stroke. b12 was supplement. He needs to continue to take b12 and check level per pcp. Vascular surgeon was consulted for AV fistular occulusion-unable to salvage the issue. TDC was placed. He tolerated HD well. He has multiple admission due to similar episodes and no compliance of medication at home. Strongly recommend to go to rehab, however, he declines the offer. He will go home with home health and CM was on board for d/c plan. He received HD on 6/6. Discharge planning discussed with patient, pt agrees  with the plan and no questions and concerns at this point.        Activity: Activity as tolerated    Diet: Diabetic Diet and Renal Diet    Follow-up: PCP , HD clinic     Disposition: home with home health     Minutes spent on discharge: 45 min       Labs: Results:       Chemistry Recent Labs     06/06/22  0110 06/05/22  0155   GLU 88 88    138   K 5.6* 5.0    104   CO2 23 25   BUN 61* 43*   CREA 9.00* 7.61*   CA 8.0* 7.8*   AGAP 7 9   BUCR 7* 6*      CBC w/Diff Recent Labs     06/06/22  0110 06/05/22  0155   WBC 9.0 9.6   RBC 2.71* 2.88*   HGB 7.1* 7.6*   HCT 21.8* 22.9*    263   GRANS 73 79*   LYMPH 10* 6*   EOS 4 3      Cardiac Enzymes No results for input(s): CPK, CKND1, WILLIE in the last 72 hours. No lab exists for component: CKRMB, TROIP   Coagulation No results for input(s): PTP, INR, APTT, INREXT in the last 72 hours. Lipid Panel Lab Results   Component Value Date/Time    Cholesterol, total 115 06/02/2022 09:45 AM    HDL Cholesterol 62 (H) 06/02/2022 09:45 AM    LDL, calculated 46 06/02/2022 09:45 AM    VLDL, calculated 7 06/02/2022 09:45 AM    Triglyceride 35 06/02/2022 09:45 AM    CHOL/HDL Ratio 1.9 06/02/2022 09:45 AM      BNP No results for input(s): BNPP in the last 72 hours. Liver Enzymes No results for input(s): TP, ALB, TBIL, AP in the last 72 hours. No lab exists for component: SGOT, GPT, DBIL   Thyroid Studies Lab Results   Component Value Date/Time    TSH 2.38 08/29/2021 01:20 AM            Significant Diagnostic Studies: IR DECLOT AV GRAFT PERCUTANEOUS    Result Date: 6/6/2022  See impression. Fluoroscopy was provided for this procedure under the supervision and/or direction of the attending provider. ? For further information regarding this procedure, see patient medical record. MRI BRAIN WO CONT    Result Date: 6/1/2022  Brain MR without contrast: Indication: Mental status change. Decreased level of consciousness. Hypoglycemia. Procedure: Sagittal spin echo T1, axial FSE FLAIR and T2 and axial diffusion weighted scanning was performed. An axial T2* scan optimized to detect hemosiderin and calcium was performed. Coronal spin echo T1and FSE T2 scans were also performed. No contrast was administered. Comparison exam: Head CT 6/1/2022. Brain MRI 8/31/2021. Findings: Diffusion: There are no areas of restricted diffusion, no acute infarction. The T2* scan shows no acute or chronic hemorrhage or abnormal calcifications.  Brain parenchyma: Numerous bilateral right greater than left cerebellar hemisphere chronic areas of infarction are noted, unchanged from previous MRI. There are several chronic areas of infarction that are linear on the right and a single lesion on the left. There is a right paracentral upper pontine chronic lacunar small vessel infarction that is new from the previous MRI, centrally fluid signal with some peripheral high signal FLAIR. Very low level ischemic changes parieto-occipital periventricular and deep white matter. No new focal supratentorial lesion, mass or mass effect. Ventricles and sulci: Top normal lateral ventricles particularly frontal horns. Mild prominence of sulci in the perisylvian region. No change from previous. Extra axial: No extra axial fluid collection or mass. Brain vasculature: Normal, no arterial vascular abnormality is noted. Craniocervical Junction: Normal. Extracranial and skull base:  Severe proptosis by imaging criteria likely from lipomatosis and shallow orbits, unchanged from previous. 1. No acute hemorrhage or acute infarction. 2. Numerous bilateral chronic right greater than left cerebellar hemisphere infarctions unchanged from previous. 3. Chronic right paramedian upper pontine lacunar small vessel infarction, new from previous MRI. 4. Mild atrophy and ischemic white matter changes. CT HEAD WO CONT    Result Date: 6/1/2022  EXAM: CT head INDICATION: Confusion. COMPARISON: 8/29/2021 TECHNIQUE: Axial CT imaging of the head was performed without intravenous contrast. Standard multiplanar coronal and sagittal reformatted images were obtained and are included in interpretation. One or more dose reduction techniques were used on this CT: automated exposure control, adjustment of the mAs and/or kVp according to patient size, and iterative reconstruction techniques. The specific techniques used on this CT exam have been documented in the patient's electronic medical record.   Digital Imaging and Communications in Medicine (DICOM) format image data are available to nonaffiliated external healthcare facilities or entities on a secure, media free, reciprocally searchable basis with patient authorization for at least a 12-month period after this study. _______________ FINDINGS: BRAIN AND POSTERIOR FOSSA: Right central arden hypodensity, new from prior study. No evidence of acute large vessel transcortical infarct or acute parenchymal hemorrhage. No midline shift or hydrocephalus. EXTRA-AXIAL SPACES AND MENINGES: There are no abnormal extra-axial fluid collections. CALVARIUM: Intact. SINUSES: Clear. OTHER: None. _______________     Right central arden hypodensity, new from prior study, age indeterminate infarct. Correlate clinically. CT ABD PELV WO CONT    Result Date: 5/16/2022  EXAM: CT of the abdomen and pelvis CLINICAL INDICATION/HISTORY: LLQ abd pain, constipation and anal pain   > Additional: None. COMPARISON: 09/01/2021   > Reference Exam: None. TECHNIQUE: Axial CT imaging of the abdomen and pelvis was performed without intravenous contrast. Oral contrast not administered. Multiplanar reformats were generated. Dose reduction techniques used: automated exposure control, adjustment of the mAs and/or kVp according to patient size, and iterative reconstruction techniques. Digital Imaging and Communications in Medicine (DICOM) format image data are available to nonaffiliated external healthcare facilities or entities on a secure, media free, reciprocally searchable basis with patient authorization for at least a 12-month period after this study. _______________ FINDINGS: LOWER CHEST: Lung bases are clear. Heart is mildly enlarged without pericardial effusion. LIVER, BILIARY: Liver is unremarkable. No biliary dilation. Gallbladder is markedly contracted but otherwise unremarkable. PANCREAS: Unremarkable. SPLEEN: Unremarkable. ADRENALS: Unremarkable. KIDNEYS/URETERS/BLADDER: No hydronephrosis. No calculi.  LYMPH NODES: No enlarged lymph nodes. GASTROINTESTINAL TRACT: No bowel dilation or wall thickening. Colonic diverticulosis. PELVIC ORGANS: Unremarkable. VASCULATURE: Moderate to marked atherosclerosis. BONES: No acute or aggressive osseous abnormalities identified. OTHER: No ascites. _______________     1. No acute intra-abdominal abnormality. Colonic diverticulosis without acute diverticulitis. * **  *    XR CHEST PORT    Result Date: 6/2/2022  EXAM: XR CHEST PORT CLINICAL INDICATION/HISTORY: CHF -Additional: None COMPARISON: One day prior TECHNIQUE: Portable frontal view of the chest _______________ FINDINGS: SUPPORT DEVICES: Right IJV catheter with tip at the cavoatrial junction. HEART AND MEDIASTINUM: Cardiomegaly. LUNGS AND PLEURAL SPACES: Bilateral parenchymal/interstitial haziness. No large effusion or pneumothorax. _______________     Cardiomegaly with pulmonary edema. XR CHEST PORT    Result Date: 6/1/2022  EXAM: XR CHEST PORT CLINICAL INDICATION/HISTORY: Encephalopathy -Additional: None COMPARISON: 1/16/2022 TECHNIQUE: Portable frontal view of the chest _______________ FINDINGS: SUPPORT DEVICES: Interval placement of a right IJV vessels catheter with tip at the cavoatrial junction. HEART AND MEDIASTINUM: Cardiomegaly. LUNGS AND PLEURAL SPACES: Bilateral parenchymal/interstitial haziness. No large effusion or pneumothorax. _______________     Cardiomegaly with pulmonary edema. IR INSERT NON TUNL CVC OVER 5 YRS    Result Date: 6/1/2022  PROCEDURE: NON-TUNNELED DIALYSIS CATHETER PLACEMENT ATTENDING: Calvin Hampton M.D. CONTRAST: None COMPLICATIONS: None MEDICATIONS: Local lidocaine. INDICATION: Renal failure requiring hemodialysis. PROCEDURE: Informed consent was obtained where the risks, benefits and alternatives to the procedure were explained.  All elements of maximal sterile barrier technique followed including: cap and mask and sterile gown and sterile gloves and a large sterile sheet and hand hygiene and 2% chlorhexidine for cutaneous antisepsis (or acceptable alternative antiseptics, per current guidelines). Sterile ultrasound gel and a sterile cover for the US probe was used. After successfully identifying a patent right internal jugular vein, real-time ultrasound guidance demonstrated patent right internal jugular vein with normal waveforms. Then, real-time ultrasound guidance was used to puncture the right internal jugular vein. Permanent recording was created for the patient's record. Using this access, a 12 Fr dialysis catheter was placed under fluoroscopic guidance with its tip at the caval atrial junction. The catheter was secured to the skin with sutures, the ports heparinized, and a sterile dressing applied to the area. The catheter may be used immediately. The patient tolerated the procedure well and was transferred from the IR suite in stable condition. Fluoroscopic dose (reference air kerma): 9 mGy     Non-tunneled dialysis catheter placement performed, as discussed above. ECHO ADULT COMPLETE    Result Date: 6/2/2022    Left Ventricle: Normal left ventricular systolic function with a visually estimated EF of 60 - 65%. Left ventricle size is normal. Moderately increased wall thickness. Findings consistent with moderate concentric hypertrophy. Normal wall motion. Grade I diastolic dysfunction with normal LAP.   Left Atrium: Left atrium is mildly dilated.   Mitral Valve: Moderate annular calcification at the posterior leaflet of the mitral valve. Trace regurgitation. No stenosis noted.   Tricuspid Valve : Inadequate tricuspid regurgitant jejt to comment on estimated PASP.   Interatrial Septum: No interatrial shunt visualized with color Doppler. Grade 0 Absence of bubbles. Agitated saline study was negative with and without provocation. DUPLEX CAROTID BILATERAL    Result Date: 6/2/2022  Bilateral less than 50% stenosis of the internal carotid arteries.  No significant stenosis in the external carotid arteries bilaterally. Antegrade flow in both vertebral arteries. Normal flow in both subclavian arteries    DUPLEX UPPER EXT BYPASS GRAFT LEFT    Result Date: 6/7/2022  Occluded arteriovenous graft in left upper extremity.             [facility]     CC: Prabhu Lynn MD

## 2022-06-07 NOTE — PROGRESS NOTES
Discharge instructions reviewed with the patient. Patient verbalized understanding and verified by teach back. All questions answered. IV discontinued, no redness, swelling or pain noted. Patient discharged off the unit via medicaid transport. Patient armband removed and shredded.

## 2022-06-07 NOTE — DISCHARGE INSTRUCTIONS
DISCHARGE SUMMARY from Nurse    PATIENT INSTRUCTIONS:    After general anesthesia or intravenous sedation, for 24 hours or while taking prescription Narcotics:  · Limit your activities  · Do not drive and operate hazardous machinery  · Do not make important personal or business decisions  · Do  not drink alcoholic beverages  · If you have not urinated within 8 hours after discharge, please contact your surgeon on call. Report the following to your surgeon:  · Excessive pain, swelling, redness or odor of or around the surgical area  · Temperature over 100.5  · Nausea and vomiting lasting longer than 4 hours or if unable to take medications  · Any signs of decreased circulation or nerve impairment to extremity: change in color, persistent  numbness, tingling, coldness or increase pain  · Any questions    What to do at Home:  Recommended activity: Activity as tolerated,     If you experience any of the following symptoms , please follow up with . *  Please give a list of your current medications to your Primary Care Provider. *  Please update this list whenever your medications are discontinued, doses are      changed, or new medications (including over-the-counter products) are added. *  Please carry medication information at all times in case of emergency situations. These are general instructions for a healthy lifestyle:    No smoking/ No tobacco products/ Avoid exposure to second hand smoke  Surgeon General's Warning:  Quitting smoking now greatly reduces serious risk to your health.     Obesity, smoking, and sedentary lifestyle greatly increases your risk for illness    A healthy diet, regular physical exercise & weight monitoring are important for maintaining a healthy lifestyle    You may be retaining fluid if you have a history of heart failure or if you experience any of the following symptoms:  Weight gain of 3 pounds or more overnight or 5 pounds in a week, increased swelling in our hands or feet or shortness of breath while lying flat in bed. Please call your doctor as soon as you notice any of these symptoms; do not wait until your next office visit. The discharge information has been reviewed with the patient. The patient verbalized understanding. Discharge medications reviewed with the patient and appropriate educational materials and side effects teaching were provided.   ___________________________________________________________________________________________________________________________________

## 2022-06-20 ENCOUNTER — APPOINTMENT (OUTPATIENT)
Dept: GENERAL RADIOLOGY | Age: 52
DRG: 252 | End: 2022-06-20
Attending: EMERGENCY MEDICINE
Payer: MEDICARE

## 2022-06-20 ENCOUNTER — HOSPITAL ENCOUNTER (INPATIENT)
Age: 52
LOS: 15 days | Discharge: HOME HEALTH | DRG: 252 | End: 2022-07-05
Attending: EMERGENCY MEDICINE | Admitting: HOSPITALIST
Payer: MEDICARE

## 2022-06-20 DIAGNOSIS — E87.70 HYPERVOLEMIA, UNSPECIFIED HYPERVOLEMIA TYPE: ICD-10-CM

## 2022-06-20 DIAGNOSIS — Z99.2 ESRD (END STAGE RENAL DISEASE) ON DIALYSIS (HCC): ICD-10-CM

## 2022-06-20 DIAGNOSIS — U07.1 COVID-19: Primary | ICD-10-CM

## 2022-06-20 DIAGNOSIS — I16.0 HYPERTENSIVE URGENCY: ICD-10-CM

## 2022-06-20 DIAGNOSIS — N18.6 ESRD (END STAGE RENAL DISEASE) ON DIALYSIS (HCC): ICD-10-CM

## 2022-06-20 PROBLEM — J81.1 PULMONARY EDEMA: Status: ACTIVE | Noted: 2022-06-20

## 2022-06-20 LAB
ALBUMIN SERPL-MCNC: 2.5 G/DL (ref 3.4–5)
ALBUMIN/GLOB SERPL: 0.5 {RATIO} (ref 0.8–1.7)
ALP SERPL-CCNC: 69 U/L (ref 45–117)
ALT SERPL-CCNC: 7 U/L (ref 16–61)
ANION GAP SERPL CALC-SCNC: 11 MMOL/L (ref 3–18)
APTT PPP: 40.9 SEC (ref 23–36.4)
AST SERPL-CCNC: 8 U/L (ref 10–38)
BASOPHILS # BLD: 0 K/UL (ref 0–0.1)
BASOPHILS NFR BLD: 0 % (ref 0–2)
BILIRUB SERPL-MCNC: 0.6 MG/DL (ref 0.2–1)
BNP SERPL-MCNC: ABNORMAL PG/ML (ref 0–450)
BUN SERPL-MCNC: 67 MG/DL (ref 7–18)
BUN/CREAT SERPL: 4 (ref 12–20)
CALCIUM SERPL-MCNC: 8.2 MG/DL (ref 8.5–10.1)
CHLORIDE SERPL-SCNC: 97 MMOL/L (ref 100–111)
CO2 SERPL-SCNC: 24 MMOL/L (ref 21–32)
COVID-19 RAPID TEST, COVR: DETECTED
CREAT SERPL-MCNC: 16.4 MG/DL (ref 0.6–1.3)
DIFFERENTIAL METHOD BLD: ABNORMAL
EOSINOPHIL # BLD: 0.1 K/UL (ref 0–0.4)
EOSINOPHIL NFR BLD: 1 % (ref 0–5)
ERYTHROCYTE [DISTWIDTH] IN BLOOD BY AUTOMATED COUNT: 18.3 % (ref 11.6–14.5)
FLUAV AG NPH QL IA: NEGATIVE
FLUBV AG NOSE QL IA: NEGATIVE
GLOBULIN SER CALC-MCNC: 5 G/DL (ref 2–4)
GLUCOSE BLD STRIP.AUTO-MCNC: 75 MG/DL (ref 70–110)
GLUCOSE SERPL-MCNC: 76 MG/DL (ref 74–99)
HCT VFR BLD AUTO: 21.9 % (ref 36–48)
HGB BLD-MCNC: 7.2 G/DL (ref 13–16)
IMM GRANULOCYTES # BLD AUTO: 0.1 K/UL (ref 0–0.04)
IMM GRANULOCYTES NFR BLD AUTO: 1 % (ref 0–0.5)
INR PPP: 1.4 (ref 0.8–1.2)
LACTATE BLD-SCNC: 0.82 MMOL/L (ref 0.4–2)
LYMPHOCYTES # BLD: 0.6 K/UL (ref 0.9–3.6)
LYMPHOCYTES NFR BLD: 5 % (ref 21–52)
MAGNESIUM SERPL-MCNC: 2.6 MG/DL (ref 1.6–2.6)
MCH RBC QN AUTO: 25.2 PG (ref 24–34)
MCHC RBC AUTO-ENTMCNC: 32.9 G/DL (ref 31–37)
MCV RBC AUTO: 76.6 FL (ref 78–100)
MONOCYTES # BLD: 1.4 K/UL (ref 0.05–1.2)
MONOCYTES NFR BLD: 11 % (ref 3–10)
NEUTS SEG # BLD: 10.5 K/UL (ref 1.8–8)
NEUTS SEG NFR BLD: 83 % (ref 40–73)
NRBC # BLD: 0 K/UL (ref 0–0.01)
NRBC BLD-RTO: 0 PER 100 WBC
PLATELET # BLD AUTO: 327 K/UL (ref 135–420)
PMV BLD AUTO: 9.8 FL (ref 9.2–11.8)
POTASSIUM SERPL-SCNC: 4.7 MMOL/L (ref 3.5–5.5)
PROT SERPL-MCNC: 7.5 G/DL (ref 6.4–8.2)
PROTHROMBIN TIME: 17.2 SEC (ref 11.5–15.2)
RBC # BLD AUTO: 2.86 M/UL (ref 4.35–5.65)
SODIUM SERPL-SCNC: 132 MMOL/L (ref 136–145)
SOURCE, COVRS: ABNORMAL
TROPONIN-HIGH SENSITIVITY: 86 NG/L (ref 0–78)
WBC # BLD AUTO: 12.8 K/UL (ref 4.6–13.2)

## 2022-06-20 PROCEDURE — 99285 EMERGENCY DEPT VISIT HI MDM: CPT

## 2022-06-20 PROCEDURE — 65270000046 HC RM TELEMETRY

## 2022-06-20 PROCEDURE — 85730 THROMBOPLASTIN TIME PARTIAL: CPT

## 2022-06-20 PROCEDURE — 83880 ASSAY OF NATRIURETIC PEPTIDE: CPT

## 2022-06-20 PROCEDURE — 87635 SARS-COV-2 COVID-19 AMP PRB: CPT

## 2022-06-20 PROCEDURE — 71045 X-RAY EXAM CHEST 1 VIEW: CPT

## 2022-06-20 PROCEDURE — 85610 PROTHROMBIN TIME: CPT

## 2022-06-20 PROCEDURE — 87186 SC STD MICRODIL/AGAR DIL: CPT

## 2022-06-20 PROCEDURE — 90935 HEMODIALYSIS ONE EVALUATION: CPT

## 2022-06-20 PROCEDURE — 74011250637 HC RX REV CODE- 250/637: Performed by: HOSPITALIST

## 2022-06-20 PROCEDURE — 80053 COMPREHEN METABOLIC PANEL: CPT

## 2022-06-20 PROCEDURE — 87804 INFLUENZA ASSAY W/OPTIC: CPT

## 2022-06-20 PROCEDURE — 82962 GLUCOSE BLOOD TEST: CPT

## 2022-06-20 PROCEDURE — 83605 ASSAY OF LACTIC ACID: CPT

## 2022-06-20 PROCEDURE — 74011250636 HC RX REV CODE- 250/636: Performed by: STUDENT IN AN ORGANIZED HEALTH CARE EDUCATION/TRAINING PROGRAM

## 2022-06-20 PROCEDURE — 87040 BLOOD CULTURE FOR BACTERIA: CPT

## 2022-06-20 PROCEDURE — 87147 CULTURE TYPE IMMUNOLOGIC: CPT

## 2022-06-20 PROCEDURE — 5A1D70Z PERFORMANCE OF URINARY FILTRATION, INTERMITTENT, LESS THAN 6 HOURS PER DAY: ICD-10-PCS | Performed by: STUDENT IN AN ORGANIZED HEALTH CARE EDUCATION/TRAINING PROGRAM

## 2022-06-20 PROCEDURE — 83735 ASSAY OF MAGNESIUM: CPT

## 2022-06-20 PROCEDURE — 93005 ELECTROCARDIOGRAM TRACING: CPT

## 2022-06-20 PROCEDURE — 84484 ASSAY OF TROPONIN QUANT: CPT

## 2022-06-20 PROCEDURE — 85025 COMPLETE CBC W/AUTO DIFF WBC: CPT

## 2022-06-20 PROCEDURE — 87077 CULTURE AEROBIC IDENTIFY: CPT

## 2022-06-20 PROCEDURE — 94761 N-INVAS EAR/PLS OXIMETRY MLT: CPT

## 2022-06-20 PROCEDURE — 87150 DNA/RNA AMPLIFIED PROBE: CPT

## 2022-06-20 PROCEDURE — 74011250637 HC RX REV CODE- 250/637: Performed by: STUDENT IN AN ORGANIZED HEALTH CARE EDUCATION/TRAINING PROGRAM

## 2022-06-20 RX ORDER — LABETALOL HCL 20 MG/4 ML
20 SYRINGE (ML) INTRAVENOUS ONCE
Status: ACTIVE | OUTPATIENT
Start: 2022-06-20 | End: 2022-06-20

## 2022-06-20 RX ORDER — LANOLIN ALCOHOL/MO/W.PET/CERES
1000 CREAM (GRAM) TOPICAL DAILY
Status: DISCONTINUED | OUTPATIENT
Start: 2022-06-21 | End: 2022-07-06 | Stop reason: HOSPADM

## 2022-06-20 RX ORDER — HEPARIN SODIUM 5000 [USP'U]/ML
5000 INJECTION, SOLUTION INTRAVENOUS; SUBCUTANEOUS EVERY 8 HOURS
Status: DISCONTINUED | OUTPATIENT
Start: 2022-06-20 | End: 2022-07-06 | Stop reason: HOSPADM

## 2022-06-20 RX ORDER — SEVELAMER CARBONATE 800 MG/1
800 TABLET, FILM COATED ORAL
Status: DISCONTINUED | OUTPATIENT
Start: 2022-06-20 | End: 2022-06-20

## 2022-06-20 RX ORDER — SEVELAMER CARBONATE 800 MG/1
1600 TABLET, FILM COATED ORAL
Status: DISCONTINUED | OUTPATIENT
Start: 2022-06-20 | End: 2022-06-22 | Stop reason: ALTCHOICE

## 2022-06-20 RX ORDER — SODIUM BICARBONATE 650 MG/1
650 TABLET ORAL 3 TIMES DAILY
Status: DISCONTINUED | OUTPATIENT
Start: 2022-06-20 | End: 2022-06-26

## 2022-06-20 RX ORDER — CARVEDILOL 12.5 MG/1
12.5 TABLET ORAL 2 TIMES DAILY WITH MEALS
Status: DISCONTINUED | OUTPATIENT
Start: 2022-06-20 | End: 2022-07-06 | Stop reason: HOSPADM

## 2022-06-20 RX ORDER — HYDRALAZINE HYDROCHLORIDE 20 MG/ML
20 INJECTION INTRAMUSCULAR; INTRAVENOUS
Status: DISCONTINUED | OUTPATIENT
Start: 2022-06-20 | End: 2022-07-06 | Stop reason: HOSPADM

## 2022-06-20 RX ORDER — ISOSORBIDE MONONITRATE 60 MG/1
60 TABLET, EXTENDED RELEASE ORAL DAILY
Status: DISCONTINUED | OUTPATIENT
Start: 2022-06-20 | End: 2022-07-06 | Stop reason: HOSPADM

## 2022-06-20 RX ORDER — DEXTROSE MONOHYDRATE 100 MG/ML
0-250 INJECTION, SOLUTION INTRAVENOUS AS NEEDED
Status: DISCONTINUED | OUTPATIENT
Start: 2022-06-20 | End: 2022-07-06 | Stop reason: HOSPADM

## 2022-06-20 RX ORDER — DOXAZOSIN 1 MG/1
2 TABLET ORAL DAILY
Status: DISCONTINUED | OUTPATIENT
Start: 2022-06-21 | End: 2022-07-06 | Stop reason: HOSPADM

## 2022-06-20 RX ORDER — FUROSEMIDE 40 MG/1
80 TABLET ORAL 2 TIMES DAILY
Status: DISCONTINUED | OUTPATIENT
Start: 2022-06-20 | End: 2022-07-06 | Stop reason: HOSPADM

## 2022-06-20 RX ORDER — HEPARIN SODIUM 1000 [USP'U]/ML
3600 INJECTION, SOLUTION INTRAVENOUS; SUBCUTANEOUS
Status: DISCONTINUED | OUTPATIENT
Start: 2022-06-20 | End: 2022-07-06 | Stop reason: HOSPADM

## 2022-06-20 RX ORDER — ACETAMINOPHEN 325 MG/1
650 TABLET ORAL
Status: DISCONTINUED | OUTPATIENT
Start: 2022-06-20 | End: 2022-06-21 | Stop reason: SDUPTHER

## 2022-06-20 RX ORDER — PANTOPRAZOLE SODIUM 40 MG/1
40 TABLET, DELAYED RELEASE ORAL
Status: DISCONTINUED | OUTPATIENT
Start: 2022-06-21 | End: 2022-07-06 | Stop reason: HOSPADM

## 2022-06-20 RX ORDER — SODIUM CHLORIDE 0.9 % (FLUSH) 0.9 %
5-10 SYRINGE (ML) INJECTION AS NEEDED
Status: DISCONTINUED | OUTPATIENT
Start: 2022-06-20 | End: 2022-07-06 | Stop reason: HOSPADM

## 2022-06-20 RX ORDER — CALCITRIOL 0.25 UG/1
0.25 CAPSULE ORAL
Status: DISCONTINUED | OUTPATIENT
Start: 2022-06-20 | End: 2022-07-06 | Stop reason: HOSPADM

## 2022-06-20 RX ORDER — ASPIRIN 81 MG/1
81 TABLET ORAL DAILY
Status: DISCONTINUED | OUTPATIENT
Start: 2022-06-21 | End: 2022-07-06 | Stop reason: HOSPADM

## 2022-06-20 RX ORDER — MAGNESIUM SULFATE 100 %
16 CRYSTALS MISCELLANEOUS AS NEEDED
Status: DISCONTINUED | OUTPATIENT
Start: 2022-06-20 | End: 2022-07-06 | Stop reason: HOSPADM

## 2022-06-20 RX ORDER — INSULIN LISPRO 100 [IU]/ML
INJECTION, SOLUTION INTRAVENOUS; SUBCUTANEOUS
Status: DISCONTINUED | OUTPATIENT
Start: 2022-06-20 | End: 2022-07-06 | Stop reason: HOSPADM

## 2022-06-20 RX ADMIN — CALCITRIOL CAPSULES 0.25 MCG 0.25 MCG: 0.25 CAPSULE ORAL at 18:00

## 2022-06-20 RX ADMIN — FUROSEMIDE 80 MG: 40 TABLET ORAL at 23:49

## 2022-06-20 RX ADMIN — SODIUM BICARBONATE 650 MG: 650 TABLET ORAL at 23:48

## 2022-06-20 RX ADMIN — CALCITRIOL CAPSULES 0.25 MCG 0.25 MCG: 0.25 CAPSULE ORAL at 23:49

## 2022-06-20 RX ADMIN — HEPARIN SODIUM 3600 UNITS: 1000 INJECTION INTRAVENOUS; SUBCUTANEOUS at 22:56

## 2022-06-20 RX ADMIN — SEVELAMER CARBONATE 1600 MG: 800 TABLET, FILM COATED ORAL at 18:01

## 2022-06-20 RX ADMIN — CARVEDILOL 12.5 MG: 12.5 TABLET, FILM COATED ORAL at 17:59

## 2022-06-20 NOTE — ROUTINE PROCESS
TRANSFER - OUT REPORT:    Verbal report given to tele RN(name) on Kailey Vega  being transferred to tele(unit) for routine progression of care       Report consisted of patients Situation, Background, Assessment and   Recommendations(SBAR). Information from the following report(s) SBAR was reviewed with the receiving nurse. Lines:   Peripheral IV 06/20/22 Right Antecubital (Active)   Site Assessment Clean, dry, & intact 06/20/22 0930   Phlebitis Assessment 0 06/20/22 0930   Infiltration Assessment 0 06/20/22 0930   Dressing Status Occlusive 06/20/22 0930   Dressing Type Transparent 06/20/22 0930   Hub Color/Line Status Pink 06/20/22 0930   Action Taken Blood drawn 06/20/22 0930   Alcohol Cap Used Yes 06/20/22 0930        Opportunity for questions and clarification was provided.       Patient transported with:   Monitor

## 2022-06-20 NOTE — H&P
History & Physical    Patient: Tigist Hoover MRN: [id number]  CSN: [id number]    YOB: 1970  Age: 54 y.o. Sex: male      DOA: 6/20/2022  Primary Care Provider:  Georgina Yi MD      Assessment/Plan     Patient Active Problem List   Diagnosis Code    Iron deficiency anemia D50.9    Other restrictive cardiomyopathy ([facility] [address] I42.5    ESRD (end stage renal disease) on dialysis ([facility] [address] N18.6, Z99.2    HTN (hypertension) I10    Hx of BKA, right ([facility] [address] Z89.511    Uremia N19    Non-compliance with renal dialysis ([facility] [address] Z91.15    Hypertension I10    COVID-19 U07.1    Pulmonary edema J81.1    Volume overload E87.70       Admit to monitored floor    Volume overload/pulmonary edema-  Fluid removal with HD    ESRD-  Missed dialysis  Fluid removal with HD. Nephrology following    Hypertension-  Uncontrolled,  Blood pressure improved with labetalol  Continue with home medications. COVID 19-  Not hypoxic, will hold onto starting on steroids or dexamethasone. DM-  Started on sliding scale insulin, ADA diet. Right BKA    DVT prophylaxis with heparin        Estimated length of stay : 2 to 3 days    CC: Fever, cough, congestion       HPI:     Tigist Hoover is a 54 y.o. male with end-stage renal disease, diabetes, CHF, hypertension, s/p right BKA presents to ER with concerns of not feeling well, fever congestion cough and shortness of breath. Patient reports that he has been feeling the symptoms since last Wednesday after his dialysis. He missed his dialysis last Friday. He reports that he is feeling tired, fatigued, on and off fever, congestion and cough. Also felt some shortness of breath. He denies any chest pain. In ER he was noted to have systolic blood pressure above 200. His BUN/creatinine at 67/16.4, high-sensitivity troponin at 86, NT proBNP at 92,443. Chest x-ray showed pulmonary edema, he was COVID-19 positive.     Past Medical History:   Diagnosis Date    Chronic kidney disease  Diabetes ([facility] [address]     Heart failure ([facility] [address]     Hemodialysis patient ([facility] [address]     Hypertension     Sickle cell trait ([facility] [address]        Past Surgical History:   Procedure Laterality Date    HX ORTHOPAEDIC      right BKA 2017    IR DECLOT AV GRAFT PERCUTANEOUS  6/3/2022    IR INSERT NON TUNL CVC OVER 5 YRS  6/1/2022       History reviewed. No pertinent family history. Social History     Socioeconomic History    Marital status: LEGALLY    Tobacco Use    Smoking status: Former Smoker    Smokeless tobacco: Never Used   Substance and Sexual Activity    Alcohol use: No    Drug use: No       Prior to Admission medications    Medication Sig Start Date End Date Taking? Authorizing Provider   carvediloL (COREG) 12.5 mg tablet Take 1 Tablet by mouth two (2) times daily (with meals) for 30 days. 6/7/22 7/7/22  Fidel Arnold MD   furosemide (Lasix) 80 mg tablet Take 1 Tablet by mouth two (2) times a day. 6/7/22   Fidel Arnold MD   isosorbide mononitrate ER (IMDUR) 60 mg CR tablet Take 1 Tablet by mouth daily. 6/7/22   Fidel Arnold MD   cyanocobalamin 1,000 mcg tablet Take 1 Tablet by mouth daily. 6/7/22   Fidel Arnold MD   aspirin delayed-release 81 mg tablet Take 1 Tablet by mouth daily. 9/7/21   Jack Evangelista DO   doxazosin (CARDURA) 4 mg tablet Take 2 mg by mouth daily. Provider, Historical   sodium hypochlorite 0.0125% (DAKINS SOLUTION) Apply 1 mL to affected area two (2) times a week. Provider, Historical   omeprazole (PRILOSEC) 40 mg capsule Take 40 mg by mouth daily. Provider, Historical   sevelamer carbonate (RENVELA) 800 mg tab tab Take 800 mg by mouth three (3) times daily (with meals). Provider, Historical   sodium bicarbonate 650 mg tablet Take 650 mg by mouth three (3) times daily. Provider, Historical       Allergies   Allergen Reactions    Penicillins Rash    Vancomycin Other (comments)     kayla syndrome       Review of Systems  Gen: No fever, chills, malaise, weight loss/gain.    Heent: No headache, rhinorrhea, epistaxis, ear pain, hearing loss, sinus pain, neck pain/stiffness, sore throat. Heart: See above  Resp: See above. GI: No nausea, vomiting, diarrhea, constipation, melena or hematochezia. : No urinary obstruction, dysuria or hematuria. Derm: No rash, new skin lesion or pruritis. Musc/skeletal: Right BKA  Vasc: No edema, cyanosis or claudication. Endo: No heat/cold intolerance, no polyuria,polydipsia or polyphagia. Neuro: No unilateral weakness, numbness, tingling. No seizures. Heme: No easy bruising or bleeding. Physical Exam:     Physical Exam:  Visit Vitals  /78   Pulse (!) 106   Temp 99.7 °F (37.6 °C)   Resp 22   Wt 83.9 kg (185 lb)   SpO2 96%   BMI 28.98 kg/m²      O2 Device: None (Room air)    Temp (24hrs), Av.7 °F (37.6 °C), Min:99.7 °F (37.6 °C), Max:99.7 °F (37.6 °C)    No intake/output data recorded. No intake/output data recorded. General:  Awake, cooperative, no distress. Head:  Normocephalic, without obvious abnormality, atraumatic. Eyes:  Conjunctivae/corneas clear, sclera anicteric, PERRL, EOMs intact. Nose: Nares normal. No drainage or sinus tenderness. Throat: Lips, mucosa, and tongue normal.    Neck: Supple, symmetrical, trachea midline, no adenopathy. Lungs:    Decreased breath sounds lower lungs bilaterally. Heart:   S1, S2, no murmur, click, rub or gallop. Abdomen: Soft, non-tender. Bowel sounds normal. No masses,  No organomegaly. Extremities:  Right BKA   Pulses:    Skin: Skin color pink, turgor normal. No rashes or lesions   Neurologic: CNII-XII intact. No focal motor or sensory deficit.        Labs Reviewed:    CMP:   Lab Results   Component Value Date/Time     (L) 2022 09:15 AM    K 4.7 2022 09:15 AM    CL 97 (L) 2022 09:15 AM    CO2 24 2022 09:15 AM    AGAP 11 2022 09:15 AM    GLU 76 2022 09:15 AM    BUN 67 (H) 2022 09:15 AM    CREA 16.40 (H) 2022 09:15 AM    GFRAA 4 (L) 06/20/2022 09:15 AM    GFRNA 3 (L) 06/20/2022 09:15 AM    CA 8.2 (L) 06/20/2022 09:15 AM    MG 2.6 06/20/2022 09:15 AM    ALB 2.5 (L) 06/20/2022 09:15 AM    TP 7.5 06/20/2022 09:15 AM    GLOB 5.0 (H) 06/20/2022 09:15 AM    AGRAT 0.5 (L) 06/20/2022 09:15 AM    ALT 7 (L) 06/20/2022 09:15 AM     CBC:   Lab Results   Component Value Date/Time    WBC 12.8 06/20/2022 09:15 AM    HGB 7.2 (L) 06/20/2022 09:15 AM    HCT 21.9 (L) 06/20/2022 09:15 AM     06/20/2022 09:15 AM     All Cardiac Markers in the last 24 hours: No results found for: CPK, CK, CKMMB, CKMB, RCK3, CKMBT, CKNDX, CKND1, WILLIE, TROPT, TROIQ, CHANDA, TROPT, TNIPOC, BNP, BNPP      Procedures/imaging: see electronic medical records for all procedures/Xrays and details which were not copied into this note but were reviewed prior to creation of Plan    Please note that this dictation was completed with Voice2Insight, the computer voice recognition software. Quite often unanticipated grammatical, syntax, homophones, and other interpretive errors are inadvertently transcribed by the computer software. Please disregard these errors. Please excuse any errors that have escaped final proofreading.         CC: William Garay MD

## 2022-06-20 NOTE — Clinical Note
Status[de-identified] INPATIENT [101]   Type of Bed: Telemetry [19]   Cardiac Monitoring Required?: Yes   Inpatient Hospitalization Certified Necessary for the Following Reasons: 3.  Patient receiving treatment that can only be provided in an inpatient setting (further clarification in H&P documentation)   Admitting Diagnosis: COVID-19 [[id number]]   Admitting Physician: Antonette Miller [[id number]]   Attending Physician: Antonette Miller [[id number]]   Estimated Length of Stay: 2 Midnights   Discharge Plan[de-identified] [facility]

## 2022-06-20 NOTE — ED NOTES
Pt with small amount of brown stool; skin cleansed;  Linens changed and new pad placed underneath patient  Pt requesting pain medication for back pain;

## 2022-06-20 NOTE — ED PROVIDER NOTES
EMERGENCY DEPARTMENT HISTORY AND PHYSICAL EXAM    Date: 6/20/2022  Patient Name: Jatinder Turpin    History of Presenting Illness     Chief Complaint   Patient presents with    Fatigue         History Provided By: Patient and EMS    9:07 AM  Jatinder Turpin is a 54 y.o. male with PMHX of diabetes, hypertension, ESRD, last dialysis on Wednesday of last week who presents to the emergency department C/O fever, congestion, cough, shortness of breath, nausea, vomiting, diarrhea. Patient reports he was not feeling good when he had dialysis last Wednesday but symptoms became more significant the next day on Thursday and that is why he missed dialysis on Friday. He reports he was supposed to get dialysis today, Monday but was feeling too weak so called 911 to bring him to the hospital.  He denies any chest pain, known sick contacts, recent travel. No clear relieving or exacerbating factors identified. PCP: Niles Aguirre MD    Current Facility-Administered Medications   Medication Dose Route Frequency Provider Last Rate Last Admin    sodium chloride (NS) flush 5-10 mL  5-10 mL IntraVENous PRN Oscar Licea MD        labetaloL (NORMODYNE;TRANDATE) 20 mg/4 mL (5 mg/mL) injection 20 mg  20 mg IntraVENous ONCE Oscar Licea MD         Current Outpatient Medications   Medication Sig Dispense Refill    carvediloL (COREG) 12.5 mg tablet Take 1 Tablet by mouth two (2) times daily (with meals) for 30 days. 60 Tablet 0    furosemide (Lasix) 80 mg tablet Take 1 Tablet by mouth two (2) times a day. 60 Tablet 0    isosorbide mononitrate ER (IMDUR) 60 mg CR tablet Take 1 Tablet by mouth daily. 30 Tablet 0    cyanocobalamin 1,000 mcg tablet Take 1 Tablet by mouth daily. 30 Tablet 0    aspirin delayed-release 81 mg tablet Take 1 Tablet by mouth daily. 30 Tablet 0    doxazosin (CARDURA) 4 mg tablet Take 2 mg by mouth daily.       sodium hypochlorite 0.0125% (DAKINS SOLUTION) Apply 1 mL to affected area two (2) times a week.      omeprazole (PRILOSEC) 40 mg capsule Take 40 mg by mouth daily.  sevelamer carbonate (RENVELA) 800 mg tab tab Take 800 mg by mouth three (3) times daily (with meals).  sodium bicarbonate 650 mg tablet Take 650 mg by mouth three (3) times daily. Past History       Past Medical History:  Past Medical History:   Diagnosis Date    Chronic kidney disease     Diabetes ([facility] [address]     Heart failure ([facility] [address]     Hemodialysis patient ([facility] [address]     Hypertension     Sickle cell trait ([facility] [address]        Past Surgical History:  Past Surgical History:   Procedure Laterality Date    HX ORTHOPAEDIC      right BKA 2017    IR DECLOT AV GRAFT PERCUTANEOUS  6/3/2022    IR INSERT NON TUNL CVC OVER 5 YRS  6/1/2022       Family History:  History reviewed. No pertinent family history. Social History:  Social History     Tobacco Use    Smoking status: Former Smoker    Smokeless tobacco: Never Used   Substance Use Topics    Alcohol use: No    Drug use: No       Allergies: Allergies   Allergen Reactions    Penicillins Rash    Vancomycin Other (comments)     kayla syndrome         Review of Systems   Review of Systems   Constitutional: Positive for fever. HENT: Positive for congestion. Respiratory: Positive for cough and shortness of breath. Cardiovascular: Negative for chest pain. Gastrointestinal: Positive for diarrhea, nausea and vomiting. Negative for abdominal pain. All other systems reviewed and are negative.         Physical Exam     Vitals:    06/20/22 0908 06/20/22 0945 06/20/22 1015 06/20/22 1123   BP: (!) 208/101      Pulse: (!) 101 97 100 95   Resp: 15 13 28 18   Temp: 99.7 °F (37.6 °C)      SpO2: 95% 97% 94% 95%   Weight: 83.9 kg (185 lb)        Physical Exam    Nursing notes and vital signs reviewed    Constitutional: Non toxic, chronically ill appearing, moderate distress  Head: Normocephalic, Atraumatic  Eyes: EOMI  Neck: Supple  Cardiovascular: Tachycardic and regular rhythm, no murmurs, rubs, or gallops  Chest: Normal work of breathing and chest excursion bilaterally the dialysis catheter in the right anterior chest wall  Lungs: Diminished to ausculation bilaterally  Abdomen: Soft, non tender, non distended  Back: No evidence of trauma or deformity  Extremities: BKA with prosthesis in place  Skin: Warm and dry, normal cap refill  Neuro: Alert and appropriate  Psychiatric: Normal mood and affect      Diagnostic Study Results     Labs -     Recent Results (from the past 12 hour(s))   METABOLIC PANEL, COMPREHENSIVE    Collection Time: 06/20/22  9:15 AM   Result Value Ref Range    Sodium 132 (L) 136 - 145 mmol/L    Potassium 4.7 3.5 - 5.5 mmol/L    Chloride 97 (L) 100 - 111 mmol/L    CO2 24 21 - 32 mmol/L    Anion gap 11 3.0 - 18 mmol/L    Glucose 76 74 - 99 mg/dL    BUN 67 (H) 7.0 - 18 MG/DL    Creatinine 16.40 (H) 0.6 - 1.3 MG/DL    BUN/Creatinine ratio 4 (L) 12 - 20      GFR est AA 4 (L) >60 ml/min/1.73m2    GFR est non-AA 3 (L) >60 ml/min/1.73m2    Calcium 8.2 (L) 8.5 - 10.1 MG/DL    Bilirubin, total 0.6 0.2 - 1.0 MG/DL    ALT (SGPT) 7 (L) 16 - 61 U/L    AST (SGOT) 8 (L) 10 - 38 U/L    Alk. phosphatase 69 45 - 117 U/L    Protein, total 7.5 6.4 - 8.2 g/dL    Albumin 2.5 (L) 3.4 - 5.0 g/dL    Globulin 5.0 (H) 2.0 - 4.0 g/dL    A-G Ratio 0.5 (L) 0.8 - 1.7     CBC WITH AUTOMATED DIFF    Collection Time: 06/20/22  9:15 AM   Result Value Ref Range    WBC 12.8 4.6 - 13.2 K/uL    RBC 2.86 (L) 4.35 - 5.65 M/uL    HGB 7.2 (L) 13.0 - 16.0 g/dL    HCT 21.9 (L) 36.0 - 48.0 %    MCV 76.6 (L) 78.0 - 100.0 FL    MCH 25.2 24.0 - 34.0 PG    MCHC 32.9 31.0 - 37.0 g/dL    RDW 18.3 (H) 11.6 - 14.5 %    PLATELET 149 016 - 579 K/uL    MPV 9.8 9.2 - 11.8 FL    NRBC 0.0 0  WBC    ABSOLUTE NRBC 0.00 0.00 - 0.01 K/uL    NEUTROPHILS 83 (H) 40 - 73 %    LYMPHOCYTES 5 (L) 21 - 52 %    MONOCYTES 11 (H) 3 - 10 %    EOSINOPHILS 1 0 - 5 %    BASOPHILS 0 0 - 2 %    IMMATURE GRANULOCYTES 1 (H) 0.0 - 0.5 %    ABS. NEUTROPHILS 10.5 (H) 1.8 - 8.0 K/UL    ABS. LYMPHOCYTES 0.6 (L) 0.9 - 3.6 K/UL    ABS. MONOCYTES 1.4 (H) 0.05 - 1.2 K/UL    ABS. EOSINOPHILS 0.1 0.0 - 0.4 K/UL    ABS. BASOPHILS 0.0 0.0 - 0.1 K/UL    ABS. IMM.  GRANS. 0.1 (H) 0.00 - 0.04 K/UL    DF AUTOMATED     PROTHROMBIN TIME + INR    Collection Time: 06/20/22  9:15 AM   Result Value Ref Range    Prothrombin time 17.2 (H) 11.5 - 15.2 sec    INR 1.4 (H) 0.8 - 1.2     PTT    Collection Time: 06/20/22  9:15 AM   Result Value Ref Range    aPTT 40.9 (H) 23.0 - 36.4 SEC   MAGNESIUM    Collection Time: 06/20/22  9:15 AM   Result Value Ref Range    Magnesium 2.6 1.6 - 2.6 mg/dL   NT-PRO BNP    Collection Time: 06/20/22  9:15 AM   Result Value Ref Range    NT pro-BNP 92,443 (H) 0 - 450 PG/ML   TROPONIN-HIGH SENSITIVITY    Collection Time: 06/20/22  9:15 AM   Result Value Ref Range    Troponin-High Sensitivity 86 (HH) 0 - 78 ng/L   INFLUENZA A & B AG (RAPID TEST)    Collection Time: 06/20/22  9:25 AM   Result Value Ref Range    Influenza A Antigen Negative NEG      Influenza B Antigen Negative NEG     COVID-19 RAPID TEST    Collection Time: 06/20/22  9:25 AM   Result Value Ref Range    Specimen source Nasopharyngeal      COVID-19 rapid test Detected (AA) NOTD     POC LACTIC ACID    Collection Time: 06/20/22  9:50 AM   Result Value Ref Range    Lactic Acid (POC) 0.82 0.40 - 2.00 mmol/L   EKG, 12 LEAD, INITIAL    Collection Time: 06/20/22 10:17 AM   Result Value Ref Range    Ventricular Rate 99 BPM    Atrial Rate 99 BPM    P-R Interval 154 ms    QRS Duration 84 ms    Q-T Interval 376 ms    QTC Calculation (Bezet) 482 ms    Calculated P Axis 75 degrees    Calculated R Axis 18 degrees    Calculated T Axis 59 degrees    Diagnosis       Normal sinus rhythm  Possible Left atrial enlargement  Prolonged QT  Abnormal ECG  When compared with ECG of 20-JUN-2022 09:12,  No significant change was found         Radiologic Studies -   XR CHEST PORT   Final Result      Cardiomegaly with mild pulmonary edema. CT Results  (Last 48 hours)    None        CXR Results  (Last 48 hours)               06/20/22 1046  XR CHEST PORT Final result    Impression:      Cardiomegaly with mild pulmonary edema. Narrative:  EXAM: XR CHEST PORT       CLINICAL INDICATION/HISTORY: meets SIRS criteria   -Additional: None       COMPARISON: 6/2/2022       TECHNIQUE: Frontal view of the chest       _______________       FINDINGS:       HEART AND MEDIASTINUM: Right IJV vascular catheter tip at the SVC. Cardiomegaly. LUNGS AND PLEURAL SPACES: Interstitial/parenchymal opacities. No large effusion   or pneumothorax. BONY THORAX AND SOFT TISSUES: No acute osseous abnormality       _______________                 Medications given in the ED-  Medications   sodium chloride (NS) flush 5-10 mL (has no administration in time range)   labetaloL (NORMODYNE;TRANDATE) 20 mg/4 mL (5 mg/mL) injection 20 mg (has no administration in time range)         Medical Decision Making   I am the first provider for this patient. I reviewed the vital signs, available nursing notes, past medical history, past surgical history, family history and social history. Vital Signs-Reviewed the patient's vital signs. Pulse Oximetry Analysis - 92% on room air, not hypoxic     Cardiac Monitor:  Rate: 94 bpm  Rhythm: Normal Sinus    EKG interpretation: (Preliminary)  EKG read by Dr. Molina Walter at 9:16 AM  Normal sinus rhythm at a rate of 94 bpm, VA interval 156 ms, QRS duration 82 ms, similar when compared to prior from June 1, 2022    Records Reviewed: Nursing Notes, Old Medical Records and Previous electrocardiograms    Provider Notes (Medical Decision Making): Dang Rueda is a 54 y.o. male presenting after 2 missed dialysis sessions due to illness. Patient is significantly hypertensive here with borderline oxygen saturations and COVID testing has revealed he is positive for COVID-19 infection.   Patient's potassium is stable but kidney function significantly elevated due to missed dialysis. IV blood pressure management ordered above blood pressure improved without interventions we will continue to monitor closely. Discussed with nephrology who will arrange for dialysis today. Discussed with hospitalist for further in-hospital evaluation and management due to borderline oxygen saturation levels, hypertensive urgency, generalized weakness. Patient understands and agrees with this plan. Procedures:  Procedures    ED Course:   CONSULT NOTE:   11:10 AM  Dr. Willis Cooper spoke with Dr. Ruby Canavan   Specialty: Nephrology  Discussed pt's hx, disposition, and available diagnostic and imaging results over the telephone. Reviewed care plans. Will arrange for dialysis, admit to hospitalist.     CONSULT NOTE:   11:19 AM  Dr. Willis Cooper spoke with Dr. Masoud Conley   Specialty: Hospitalist  Discussed pt's hx, disposition, and available diagnostic and imaging results over the telephone. Reviewed care plans. Accepts to telemetry. 11:19 AM  Updated patient on all results and plan. All questions answered. Diagnosis and Disposition     Critical Care Time: None    Core Measures:  For Hospitalized Patients:    1. Hospitalization Decision Time:  The decision to hospitalize the patient was made by Dr. Willis Cooper at 10:30 AM on 6/20/2022    2. Aspirin: Aspirin was not given because the patient did not present with a stroke at the time of their Emergency Department evaluation    11:19 AM  Patient is being admitted to the hospital by Dr. Maury Hoover. The results of their tests and reasons for their admission have been discussed with them and/or available family. They convey agreement and understanding for the need to be admitted and for their admission diagnosis. CONDITIONS ON ADMISSION:  Sepsis is not present at the time of admission. Deep Vein Thrombosis is not present at the time of admission.  Thrombosis is not present at the time of admission. Urinary Tract Infection is not present at the time of admission. Pneumonia is not present at the time of admission. MRSA is not present at the time of admission. Wound infection is not present at the time of admission. Pressure Ulcer is not present at the time of admission. CLINICAL IMPRESSION:    1. COVID-19    2. ESRD (end stage renal disease) on dialysis ([facility] [address]    3. Hypervolemia, unspecified hypervolemia type    4. Hypertensive urgency      _______________________________      Please note that this dictation was completed with GoChime, the computer voice recognition software. Quite often unanticipated grammatical, syntax, homophones, and other interpretive errors are inadvertently transcribed by the computer software. Please disregard these errors. Please excuse any errors that have escaped final proofreading.

## 2022-06-20 NOTE — ED TRIAGE NOTES
Arrived by EMS from home for c/o weakness and fever. Missed 2 dialysis appointments.  Reports left arm fistula is infected

## 2022-06-20 NOTE — CONSULTS
RENAL CONSULT  2022    Patient:  Jeannette Dewey  :  1970  Gender:  male  MRN #:  [id number]    Reason for Consult: ESRD related care, high BP     History of Present Illness:    Jeannette Dewey is a 54y.o. year old male  h/o ESRD on HD , non compliant with dialysis /skip multiple dialysis session every month despite knowing the risk presented with C/O fever, congestion, cough, shortness of breath, nausea, vomiting and  diarrhea. Patient reports he was not feeling good when he had dialysis last Wednesday symptom worsen so he missed last HD on Friday . He came in ED this morning for SOB. cough and not feeling good     He was found to have BP > 200 MMHG, CXR - pulmonary edema   COVID-19 positive   When I saw him he says he still feels weak, tired and SOB         Past Medical History:   Diagnosis Date    Chronic kidney disease     Diabetes [facility]     Heart failure ([facility]     Hemodialysis patient ([facility]     Hypertension     Sickle cell trait ([facility]      Past Surgical History:   Procedure Laterality Date    HX ORTHOPAEDIC      right BKA     IR DECLOT AV GRAFT PERCUTANEOUS  6/3/2022    IR INSERT NON TUNL CVC OVER 5 YRS  2022     History reviewed. No pertinent family history. Allergies   Allergen Reactions    Penicillins Rash    Vancomycin Other (comments)     kayla syndrome     Current Facility-Administered Medications   Medication Dose Route Frequency Provider Last Rate Last Admin    sodium chloride (NS) flush 5-10 mL  5-10 mL IntraVENous PRN Lane Licea MD        labetaloL (NORMODYNE;TRANDATE) 20 mg/4 mL (5 mg/mL) injection 20 mg  20 mg IntraVENous ONCE Lane Licea MD         Current Outpatient Medications   Medication Sig Dispense Refill    carvediloL (COREG) 12.5 mg tablet Take 1 Tablet by mouth two (2) times daily (with meals) for 30 days. 60 Tablet 0    furosemide (Lasix) 80 mg tablet Take 1 Tablet by mouth two (2) times a day.  60 Tablet 0    isosorbide mononitrate ER (IMDUR) 60 mg CR tablet Take 1 Tablet by mouth daily. 30 Tablet 0    cyanocobalamin 1,000 mcg tablet Take 1 Tablet by mouth daily. 30 Tablet 0    aspirin delayed-release 81 mg tablet Take 1 Tablet by mouth daily. 30 Tablet 0    doxazosin (CARDURA) 4 mg tablet Take 2 mg by mouth daily.  sodium hypochlorite 0.0125% (DAKINS SOLUTION) Apply 1 mL to affected area two (2) times a week.  omeprazole (PRILOSEC) 40 mg capsule Take 40 mg by mouth daily.  sevelamer carbonate (RENVELA) 800 mg tab tab Take 800 mg by mouth three (3) times daily (with meals).  sodium bicarbonate 650 mg tablet Take 650 mg by mouth three (3) times daily. Review of Symptoms:     Consitutional Symptoms: No fever, weight loss, weight gain and has atigue  Eyes:- No change in vision , no itching   Ears, Nose, Mouth, Throat:  No neck pain , swelling ,hearing loss and nose bleed. Pulmonary: No cough and  Has shortness of breath . CVS: No  Chest pain , palpitation and orthopnea  GI: intermittent diarrhea, no pain   - makes very little urine   Neurological:No dizzy ness, syncope, focal weakness and numbness . Skin : No rash and erythema   Endocrine: No feeling of excessive cold or warmth, hot flushes  Psychiatric: Denied feeling depressed    Objective:    Visit Vitals  BP (!) 169/99   Pulse (!) 106   Temp 99.7 °F (37.6 °C)   Resp 26   Wt 83.9 kg (185 lb)   SpO2 99%   BMI 28.98 kg/m²       Physical Exam:    Pt awake,  alert and comfortable  HEENT: No JVD, anicteric sclera, no neck swelling  Lung: crackles   Heart: s1s2 regualr no rubs or murmur  Abdomen: soft, non tender, no guarding, normal bowel sounds.   Ext: no edema in LLE, Rt BKA status     CNS- Oriented to time , place and person     Laboratory Data:    Lab Results   Component Value Date    BUN 67 (H) 06/20/2022    BUN 61 (H) 06/06/2022    BUN 43 (H) 06/05/2022     (L) 06/20/2022     06/06/2022     06/05/2022    CO2 24 06/20/2022    CO2 23 06/06/2022 CO2 25 06/05/2022     Lab Results   Component Value Date    WBC 12.8 06/20/2022    HGB 7.2 (L) 06/20/2022    HCT 21.9 (L) 06/20/2022     No components found for: CALCIUM, PHOSPHORUS, MAGNESIUM  Lab Results   Component Value Date    HDL 62 (H) 06/02/2022     No results found for: SPECIMENTYP, TURBIDITY, UGLU    Imaging Reveiwed:    CXR:- IMPRESSION     Cardiomegaly with mild pulmonary edema. Assessment:  Meghana Longoria is a 54y.o. year old male  h/o ESRD on HD , non compliant with dialysis /skip multiple dialysis session every month despite knowing the risk presented with c/o  fever, congestion, cough, shortness of breath, nausea, vomiting and  Diarrhea.     ESRD  Pulmonary edema  Uncontrolled Hypertension  Anemia of CKD  COVID-19 infection        Plan:    Patient examined and labs reviewed   Will oconnor for dialysis today for volume removal , as pulmonary edema seen in CXR   Next HD on Wednesday   Intake and output   Dose all  meds for ESRD      BMD- has secondary Hyperparathyroidism  Continue  calcitriol 1 mcg MWF  sevelamer for phos binders         Uncontrolled Hypertension: BP is at target range   IV labetalol once to bring down BP around 180 mmhg   Resume coreg 12.5 mg BID       Anemia of CKD- Retacrit during HD       Awilda Zaman MD, MD  [facility], [facility].- Nephrology

## 2022-06-21 LAB
ANION GAP SERPL CALC-SCNC: 10 MMOL/L (ref 3–18)
ATRIAL RATE: 94 BPM
ATRIAL RATE: 99 BPM
BUN SERPL-MCNC: 35 MG/DL (ref 7–18)
BUN/CREAT SERPL: 4 (ref 12–20)
CALCIUM SERPL-MCNC: 8.4 MG/DL (ref 8.5–10.1)
CALCULATED P AXIS, ECG09: 63 DEGREES
CALCULATED P AXIS, ECG09: 75 DEGREES
CALCULATED R AXIS, ECG10: 18 DEGREES
CALCULATED R AXIS, ECG10: 19 DEGREES
CALCULATED T AXIS, ECG11: 59 DEGREES
CALCULATED T AXIS, ECG11: 65 DEGREES
CHLORIDE SERPL-SCNC: 99 MMOL/L (ref 100–111)
CK SERPL-CCNC: 33 U/L (ref 39–308)
CO2 SERPL-SCNC: 27 MMOL/L (ref 21–32)
CREAT SERPL-MCNC: 9.32 MG/DL (ref 0.6–1.3)
CRP SERPL-MCNC: 23 MG/DL (ref 0–0.3)
D DIMER PPP FEU-MCNC: 14.76 UG/ML(FEU)
DIAGNOSIS, 93000: NORMAL
DIAGNOSIS, 93000: NORMAL
ERYTHROCYTE [DISTWIDTH] IN BLOOD BY AUTOMATED COUNT: 18 % (ref 11.6–14.5)
FERRITIN SERPL-MCNC: 1315 NG/ML (ref 8–388)
GLUCOSE BLD STRIP.AUTO-MCNC: 144 MG/DL (ref 70–110)
GLUCOSE BLD STRIP.AUTO-MCNC: 229 MG/DL (ref 70–110)
GLUCOSE BLD STRIP.AUTO-MCNC: 99 MG/DL (ref 70–110)
GLUCOSE SERPL-MCNC: 75 MG/DL (ref 74–99)
HCT VFR BLD AUTO: 22.1 % (ref 36–48)
HGB BLD-MCNC: 7.4 G/DL (ref 13–16)
LDH SERPL L TO P-CCNC: 294 U/L (ref 81–234)
MCH RBC QN AUTO: 25.1 PG (ref 24–34)
MCHC RBC AUTO-ENTMCNC: 33.5 G/DL (ref 31–37)
MCV RBC AUTO: 74.9 FL (ref 78–100)
NRBC # BLD: 0 K/UL (ref 0–0.01)
NRBC BLD-RTO: 0 PER 100 WBC
P-R INTERVAL, ECG05: 154 MS
P-R INTERVAL, ECG05: 156 MS
PLATELET # BLD AUTO: 321 K/UL (ref 135–420)
PMV BLD AUTO: 11 FL (ref 9.2–11.8)
POTASSIUM SERPL-SCNC: 3.8 MMOL/L (ref 3.5–5.5)
PROCALCITONIN SERPL-MCNC: 9.2 NG/ML
Q-T INTERVAL, ECG07: 376 MS
Q-T INTERVAL, ECG07: 386 MS
QRS DURATION, ECG06: 82 MS
QRS DURATION, ECG06: 84 MS
QTC CALCULATION (BEZET), ECG08: 482 MS
QTC CALCULATION (BEZET), ECG08: 482 MS
RBC # BLD AUTO: 2.95 M/UL (ref 4.35–5.65)
SODIUM SERPL-SCNC: 136 MMOL/L (ref 136–145)
VENTRICULAR RATE, ECG03: 94 BPM
VENTRICULAR RATE, ECG03: 99 BPM
WBC # BLD AUTO: 12.1 K/UL (ref 4.6–13.2)

## 2022-06-21 PROCEDURE — 85379 FIBRIN DEGRADATION QUANT: CPT

## 2022-06-21 PROCEDURE — 74011000250 HC RX REV CODE- 250: Performed by: INTERNAL MEDICINE

## 2022-06-21 PROCEDURE — 74011250637 HC RX REV CODE- 250/637: Performed by: INTERNAL MEDICINE

## 2022-06-21 PROCEDURE — 82728 ASSAY OF FERRITIN: CPT

## 2022-06-21 PROCEDURE — 82962 GLUCOSE BLOOD TEST: CPT

## 2022-06-21 PROCEDURE — 74011250636 HC RX REV CODE- 250/636: Performed by: HOSPITALIST

## 2022-06-21 PROCEDURE — 83615 LACTATE (LD) (LDH) ENZYME: CPT

## 2022-06-21 PROCEDURE — 36415 COLL VENOUS BLD VENIPUNCTURE: CPT

## 2022-06-21 PROCEDURE — 74011250637 HC RX REV CODE- 250/637: Performed by: HOSPITALIST

## 2022-06-21 PROCEDURE — 82550 ASSAY OF CK (CPK): CPT

## 2022-06-21 PROCEDURE — 74011000250 HC RX REV CODE- 250: Performed by: EMERGENCY MEDICINE

## 2022-06-21 PROCEDURE — 74011250637 HC RX REV CODE- 250/637: Performed by: STUDENT IN AN ORGANIZED HEALTH CARE EDUCATION/TRAINING PROGRAM

## 2022-06-21 PROCEDURE — 84145 PROCALCITONIN (PCT): CPT

## 2022-06-21 PROCEDURE — 87040 BLOOD CULTURE FOR BACTERIA: CPT

## 2022-06-21 PROCEDURE — 65270000046 HC RM TELEMETRY

## 2022-06-21 PROCEDURE — 80048 BASIC METABOLIC PNL TOTAL CA: CPT

## 2022-06-21 PROCEDURE — 74011250636 HC RX REV CODE- 250/636: Performed by: INTERNAL MEDICINE

## 2022-06-21 PROCEDURE — 85027 COMPLETE CBC AUTOMATED: CPT

## 2022-06-21 PROCEDURE — 74011000258 HC RX REV CODE- 258: Performed by: INTERNAL MEDICINE

## 2022-06-21 PROCEDURE — 86140 C-REACTIVE PROTEIN: CPT

## 2022-06-21 PROCEDURE — 87147 CULTURE TYPE IMMUNOLOGIC: CPT

## 2022-06-21 RX ORDER — PSEUDOEPHED/ACETAMINOPHEN/CPM 30-500-2MG
2 TABLET ORAL
Status: COMPLETED | OUTPATIENT
Start: 2022-06-21 | End: 2022-06-21

## 2022-06-21 RX ORDER — GUAIFENESIN/DEXTROMETHORPHAN 100-10MG/5
5 SYRUP ORAL
Status: DISCONTINUED | OUTPATIENT
Start: 2022-06-21 | End: 2022-07-06 | Stop reason: HOSPADM

## 2022-06-21 RX ORDER — ACETAMINOPHEN 650 MG/1
650 SUPPOSITORY RECTAL
Status: DISCONTINUED | OUTPATIENT
Start: 2022-06-21 | End: 2022-07-06 | Stop reason: HOSPADM

## 2022-06-21 RX ORDER — DEXAMETHASONE SODIUM PHOSPHATE 4 MG/ML
6 INJECTION, SOLUTION INTRA-ARTICULAR; INTRALESIONAL; INTRAMUSCULAR; INTRAVENOUS; SOFT TISSUE EVERY 24 HOURS
Status: DISCONTINUED | OUTPATIENT
Start: 2022-06-21 | End: 2022-06-23

## 2022-06-21 RX ORDER — ACETAMINOPHEN 325 MG/1
650 TABLET ORAL
Status: DISCONTINUED | OUTPATIENT
Start: 2022-06-21 | End: 2022-07-06 | Stop reason: HOSPADM

## 2022-06-21 RX ADMIN — LOPERAMIDE HYDROCHLORIDE 2 MG: 1 SOLUTION ORAL at 06:58

## 2022-06-21 RX ADMIN — ACETAMINOPHEN 650 MG: 325 TABLET ORAL at 00:11

## 2022-06-21 RX ADMIN — SODIUM BICARBONATE 650 MG: 650 TABLET ORAL at 11:26

## 2022-06-21 RX ADMIN — FUROSEMIDE 80 MG: 40 TABLET ORAL at 11:25

## 2022-06-21 RX ADMIN — CARVEDILOL 12.5 MG: 12.5 TABLET, FILM COATED ORAL at 11:26

## 2022-06-21 RX ADMIN — DEXAMETHASONE SODIUM PHOSPHATE 6 MG: 4 INJECTION, SOLUTION INTRAMUSCULAR; INTRAVENOUS at 06:58

## 2022-06-21 RX ADMIN — PANTOPRAZOLE SODIUM 40 MG: 40 TABLET, DELAYED RELEASE ORAL at 06:58

## 2022-06-21 RX ADMIN — DAPTOMYCIN 350 MG: 500 INJECTION, POWDER, LYOPHILIZED, FOR SOLUTION INTRAVENOUS at 14:27

## 2022-06-21 RX ADMIN — SODIUM BICARBONATE 650 MG: 650 TABLET ORAL at 22:58

## 2022-06-21 RX ADMIN — SODIUM CHLORIDE, PRESERVATIVE FREE 10 ML: 5 INJECTION INTRAVENOUS at 06:59

## 2022-06-21 RX ADMIN — ASPIRIN 81 MG: 81 TABLET, COATED ORAL at 11:26

## 2022-06-21 RX ADMIN — SEVELAMER CARBONATE 1600 MG: 800 TABLET, FILM COATED ORAL at 11:25

## 2022-06-21 RX ADMIN — DOXAZOSIN 2 MG: 1 TABLET ORAL at 11:26

## 2022-06-21 RX ADMIN — ISOSORBIDE MONONITRATE 60 MG: 60 TABLET, EXTENDED RELEASE ORAL at 11:26

## 2022-06-21 RX ADMIN — CYANOCOBALAMIN TAB 1000 MCG 1000 MCG: 1000 TAB at 11:26

## 2022-06-21 RX ADMIN — HEPARIN SODIUM 5000 UNITS: 5000 INJECTION INTRAVENOUS; SUBCUTANEOUS at 11:26

## 2022-06-21 RX ADMIN — FUROSEMIDE 80 MG: 40 TABLET ORAL at 22:58

## 2022-06-21 RX ADMIN — HEPARIN SODIUM 5000 UNITS: 5000 INJECTION INTRAVENOUS; SUBCUTANEOUS at 03:53

## 2022-06-21 RX ADMIN — CEFEPIME HYDROCHLORIDE 1 G: 1 INJECTION, POWDER, FOR SOLUTION INTRAMUSCULAR; INTRAVENOUS at 06:57

## 2022-06-21 NOTE — PROGRESS NOTES
Pharmacy Renal Dosing Services:    Daptomycin was automatically dose-adjusted per [address] P&T Committee Protocol, with respect to renal function. Consult provided for this   50 y.o. , male , for the indication of Bloodstream Infection. Dose adjusted to Daptomycin 550 mg IV q48h     HD patient   Dose ~ 8 mg/kg   (based on Actual body weight = 71.4 mg)       Pt Weight:   Wt Readings from Last 1 Encounters:   06/21/22 71.4 kg (157 lb 6.4 oz)     Previous Regimen    Daptomycin 350 mg IV q24h   Serum Creatinine Lab Results   Component Value Date/Time    Creatinine 9.32 (H) 06/21/2022 02:44 AM       Creatinine Clearance Estimated Creatinine Clearance: 8.9 mL/min (A) (based on SCr of 9.32 mg/dL (H)). BUN Lab Results   Component Value Date/Time    BUN 35 (H) 06/21/2022 02:44 AM         Pharmacy to continue to monitor patient daily. Will make dosage adjustments based upon changing renal function.   Signed Anna Arzola information:  [phone]

## 2022-06-21 NOTE — PROGRESS NOTES
Hospitalist Progress Note    Patient: Jr Winslow MRN: [id number]  CSN: [id number]    YOB: 1970  Age: 50 y.o. Sex: male    DOA: 6/20/2022 LOS:  LOS: 1 day                Assessment/Plan     Patient Active Problem List   Diagnosis Code    Iron deficiency anemia D50.9    Other restrictive cardiomyopathy ([facility] I42.5    ESRD (end stage renal disease) on dialysis ([facility] N18.6, Z99.2    HTN (hypertension) I10    Hx of BKA, right ([facility] Z89.511    Uremia N19    Non-compliance with renal dialysis ([facility] Z91.15    Hypertension I10    COVID-19 U07.1    Pulmonary edema J81.1    Volume overload E87.70        Chief complaint :  Fever, cough, congestion  54 y.o. male with end-stage renal disease, diabetes, CHF, hypertension, s/p right BKA presents to ER with concerns of not feeling well, fever congestion cough and shortness of breath. Feeling better    Bacteremia -  Blood cultures growing GPC  ID consulted  daptomycin    LUE non functioning AVG  Possible infection  Vascular consulted    Volume overload/pulmonary edema-  Resolved with Fluid removal with HD     ESRD-  Missed dialysis  Nephrology following  HD per nephrology     Hypertension-  BP better controlled  Continue with home medications.     COVID 19-  Not hypoxic, monitor     DM-  Started on sliding scale insulin, ADA diet.     Right BKA     DVT prophylaxis with heparin       Disposition : TBD    Review of systems  General: No fevers or chills. Cardiovascular: No chest pain or pressure. No palpitations. Pulmonary: No shortness of breath. Gastrointestinal: No nausea, vomiting. Physical Exam:  General: Awake, cooperative, no acute distress    HEENT: NC, Atraumatic. PERRLA, anicteric sclerae. Lungs: CTA Bilaterally. No Wheezing/Rhonchi/Rales.   Heart:  S1 S2,  No murmur, No Rubs, No Gallops  Abdomen: Soft, Non distended, Non tender.  +Bowel sounds,   Extremities: Right BKA, tender LUE AVG site  Psych:   Not anxious or agitated. Neurologic:  No acute neurological deficit. Vital signs/Intake and Output:  Visit Vitals  BP (!) 154/76 (BP 1 Location: Right lower arm, BP Patient Position: At rest)   Pulse 87   Temp 99.4 °F (37.4 °C)   Resp 18   Wt 71.4 kg (157 lb 6.4 oz)   SpO2 95%   BMI 24.65 kg/m²     Current Shift:  No intake/output data recorded. Last three shifts:  06/20 0701 - 06/21 1900  In: 120 [P.O.:120]  Out: 2750             Labs: Results:       Chemistry Recent Labs     06/21/22 0244 06/20/22 0915   GLU 75 76    132*   K 3.8 4.7   CL 99* 97*   CO2 27 24   BUN 35* 67*   CREA 9.32* 16.40*   CA 8.4* 8.2*   AGAP 10 11   BUCR 4* 4*   AP  --  69   TP  --  7.5   ALB  --  2.5*   GLOB  --  5.0*   AGRAT  --  0.5*      CBC w/Diff Recent Labs     06/21/22 0244 06/20/22 0915   WBC 12.1 12.8   RBC 2.95* 2.86*   HGB 7.4* 7.2*   HCT 22.1* 21.9*    327   GRANS  --  83*   LYMPH  --  5*   EOS  --  1      Cardiac Enzymes Recent Labs     06/21/22 0244   CPK 33*      Coagulation Recent Labs     06/20/22 0915   PTP 17.2*   INR 1.4*   APTT 40.9*       Lipid Panel Lab Results   Component Value Date/Time    Cholesterol, total 115 06/02/2022 09:45 AM    HDL Cholesterol 62 (H) 06/02/2022 09:45 AM    LDL, calculated 46 06/02/2022 09:45 AM    VLDL, calculated 7 06/02/2022 09:45 AM    Triglyceride 35 06/02/2022 09:45 AM    CHOL/HDL Ratio 1.9 06/02/2022 09:45 AM      BNP No results for input(s): BNPP in the last 72 hours.    Liver Enzymes Recent Labs     06/20/22 0915   TP 7.5   ALB 2.5*   AP 69      Thyroid Studies Lab Results   Component Value Date/Time    TSH 2.38 08/29/2021 01:20 AM        Procedures/imaging: see electronic medical records for all procedures/Xrays and details which were not copied into this note but were reviewed prior to creation of Plan

## 2022-06-21 NOTE — CONSULTS
RENAL CONSULT  2022    Patient:  Farheen Bailey  :  1970  Gender:  male  MRN #:  [id number]    Reason for Consult: ESRD related care, high BP       Subjective:  He says he feels fine but has diarrhea today   BP stable , breathing stable. No shortness of breath   Discomfort in left UE over graft     Objective:    Visit Vitals  BP (!) 154/76 (BP 1 Location: Right lower arm, BP Patient Position: At rest)   Pulse 87   Temp 99.4 °F (37.4 °C)   Resp 18   Wt 71.4 kg (157 lb 6.4 oz)   SpO2 95%   BMI 24.65 kg/m²       Physical Exam:    Pt awake,  alert and comfortable  HEENT: No JVD, anicteric sclera, no neck swelling  Lung: no crackles   Heart: s1s2 regualr no rubs or murmur  Abdomen: soft, non tender, no guarding, normal bowel sounds. Ext: no edema in LLE, Rt BKA status   Thrombosed graft in LUE, non tender, no thrill     CNS- Oriented to time , place and person     Laboratory Data:    Lab Results   Component Value Date    BUN 35 (H) 2022    BUN 67 (H) 2022    BUN 61 (H) 2022     2022     (L) 2022     2022    CO2 27 2022    CO2 24 2022    CO2 23 2022     Lab Results   Component Value Date    WBC 12.1 2022    HGB 7.4 (L) 2022    HCT 22.1 (L) 2022     No components found for: CALCIUM, PHOSPHORUS, MAGNESIUM  Lab Results   Component Value Date    HDL 62 (H) 2022     No results found for: SPECIMENTYP, TURBIDITY, UGLU    Imaging Reveiwed:    CXR:- IMPRESSION     Cardiomegaly with mild pulmonary edema. Assessment:  Farheen Bailey is a 54y.o. year old male  h/o ESRD on HD , non compliant with dialysis /skip multiple dialysis session every month despite knowing the risk presented with c/o  fever, congestion, cough, shortness of breath, nausea, vomiting and  Diarrhea. Clinically not in fluid overload. Had dialysis yesterday.  Blood culture growing gram positive cocci on antibiotics     ESRD  Uncontrolled Hypertension  Anemia of CKD  COVID-19 infection        Plan:    Patient examined and labs reviewed this morning   No clinical and metabolic indications of dialysis today   Blood culture is growing gram positive cocci , complaint of graft pain, now has [facility] for dialysis . Will consult vascular surgery for opinion   Next HD on Wednesday   Intake and output   Dose all  meds for ESRD      BMD- has secondary Hyperparathyroidism  Continue  calcitriol 1 mcg MWF  sevelamer for phos binders         Uncontrolled Hypertension: BP is at target range   Resume coreg 12.5 mg BID  On doxazosine, Imdur and lasix        Anemia of CKD- Retacrit during HD       I have reviewed patient's record for pertinent labs, radiology and other test . I have reviewed old records. I have ordered labs, medications and radiology as necessary and indicated per patient's condition . Total time spent is approximately 35 minutes. Greater than 50 % of that time was spent in counseling and or care coordination.            Jeni Dickerson MD, MD  [facility], [facility].- Nephrology

## 2022-06-21 NOTE — CONSULTS
Consult received, pt seen and examined    ESRD pt on HD, missed HD-- came in with fever/sob/chest congestion  COVID 19 +  GPC bacteremia  TDC Rigth chest  L AVG occluded     Plan:    COVID 19 infection with out hypoxia-- duration unclear, likely acute--dont think steroid indicated at this time  Cont with daptomycin  [address] ID    Vascular consult--Renal MD will be calling. Dw Dr Karla Marr    I will hold off more abx-- episode of C.diff in the past would like to avoid ABX pressure    Will follow. Thanks for the consult    Emily Lainez MD  [facility])  Office #:     [phone] [phone]   Office Fax: [phone]

## 2022-06-21 NOTE — PROGRESS NOTES
Problem: Falls - Risk of  Goal: *Absence of Falls  Description: Document Neena Lee Fall Risk and appropriate interventions in the flowsheet. Outcome: Progressing Towards Goal  Note: Fall Risk Interventions:  Mobility Interventions: Assess mobility with egress test,Communicate number of staff needed for ambulation/transfer,Patient to call before getting OOB         Medication Interventions: Teach patient to arise slowly,Patient to call before getting OOB    Elimination Interventions: Call light in reach,Patient to call for help with toileting needs    History of Falls Interventions: Evaluate medications/consider consulting pharmacy         Problem: Patient Education: Go to Patient Education Activity  Goal: Patient/Family Education  Outcome: Progressing Towards Goal     Problem: Pressure Injury - Risk of  Goal: *Prevention of pressure injury  Description: Document Tucker Scale and appropriate interventions in the flowsheet.   Outcome: Progressing Towards Goal  Note: Pressure Injury Interventions:       Moisture Interventions: Absorbent underpads,Limit adult briefs    Activity Interventions: Increase time out of bed,Pressure redistribution bed/mattress(bed type),PT/OT evaluation    Mobility Interventions: HOB 30 degrees or less,Pressure redistribution bed/mattress (bed type),PT/OT evaluation    Nutrition Interventions: Document food/fluid/supplement intake    Friction and Shear Interventions: Apply protective barrier, creams and emollients,HOB 30 degrees or less,Lift sheet,Lift team/patient mobility team,Minimize layers                Problem: Patient Education: Go to Patient Education Activity  Goal: Patient/Family Education  Outcome: Progressing Towards Goal     Problem: Airway Clearance - Ineffective  Goal: Achieve or maintain patent airway  Outcome: Progressing Towards Goal     Problem: Gas Exchange - Impaired  Goal: Absence of hypoxia  Outcome: Progressing Towards Goal  Goal: Promote optimal lung function  Outcome: Progressing Towards Goal     Problem: Breathing Pattern - Ineffective  Goal: Ability to achieve and maintain a regular respiratory rate  Outcome: Progressing Towards Goal     Problem:  Body Temperature -  Risk of, Imbalanced  Goal: Ability to maintain a body temperature within defined limits  Outcome: Progressing Towards Goal  Goal: Will regain or maintain usual level of consciousness  Outcome: Progressing Towards Goal  Goal: Complications related to the disease process, condition or treatment will be avoided or minimized  Outcome: Progressing Towards Goal     Problem: Isolation Precautions - Risk of Spread of Infection  Goal: Prevent transmission of infectious organism to others  Outcome: Progressing Towards Goal     Problem: Risk for Fluid Volume Deficit  Goal: Maintain normal heart rhythm  Outcome: Progressing Towards Goal  Goal: Maintain absence of muscle cramping  Outcome: Progressing Towards Goal  Goal: Maintain normal serum potassium, sodium, calcium, phosphorus, and pH  Outcome: Progressing Towards Goal     Problem: Nutrition Deficits  Goal: Optimize nutrtional status  Outcome: Progressing Towards Goal     Problem: Loneliness or Risk for Loneliness  Goal: Demonstrate positive use of time alone when socialization is not possible  Outcome: Progressing Towards Goal     Problem: Fatigue  Goal: Verbalize increase energy and improved vitality  Outcome: Progressing Towards Goal     Problem: Patient Education: Go to Patient Education Activity  Goal: Patient/Family Education  Outcome: Progressing Towards Goal     Problem: Chronic Renal Failure  Goal: *Fluid and electrolytes stabilized  Outcome: Progressing Towards Goal     Problem: Patient Education: Go to Patient Education Activity  Goal: Patient/Family Education  Outcome: Progressing Towards Goal     Problem: Risk for Spread of Infection  Goal: Prevent transmission of infectious organism to others  Description: Prevent the transmission of infectious organisms to other patients, staff members, and visitors.   Outcome: Progressing Towards Goal     Problem: Patient Education:  Go to Education Activity  Goal: Patient/Family Education  Outcome: Progressing Towards Goal

## 2022-06-21 NOTE — PROGRESS NOTES
Bedside and Verbal shift change report given to Tate Mendieta RN (oncoming nurse) by Eryn Arizmendi RN (offgoing nurse). Report included the following information SBAR, Kardex, ED Summary, Intake/Output, MAR, Recent Results, Med Rec Status and Cardiac Rhythm NSR.       1942  Shift assessment complete  Pt resting quietly with eyes open and chest rising and falling evenl y  No c/o pain or signs of distress  Bed locked   Call light within reach   Pt currently on dialysis    2347 - pt temp 102.1 - paged hospitalist and received orders for tylenol and blood cultures      3 [place] Cardiac/Medical Night Shift Chart Audit    Chart Audit completed? YES      Bedside and Verbal shift change report given to Coretta Roach RN (oncoming nurse) by Tate Mendieta RN (offgoing nurse). Report included the following information SBAR, Kardex, ED Summary, Intake/Output, MAR, Recent Results, Med Rec Status and Cardiac Rhythm NSR.

## 2022-06-21 NOTE — DIALYSIS
TREATMENT SUMMARY   Patient dialyzed in room 340  Tolerated treatment well without complaint or complications. Heavy clotting noted in venous chamber require one ECC circuit change  RIJ TDC w/ venous lumen unable to aspirate but fluses with ease  -400    No dressing on TDC/ OTC on admission. CHG dressing applied   4000 ml removed via UF with a with a net removal 3500 ml  Report given to Ivon Rich RN with all questions answered     TREATMENT  NOTES      HD conducted and complete by  by Rena MO HEMODIALYSIS FLOW SHEET           ACCESS   CATHETER ACCESS: []? N/A  []? RIGHT  [x]? LEFT  [x]? IJ  []? SUBCL []? FEM                    []? First use X-ray  [x]? Tunnel     []? Non-Tunneled      [x]? No S/S infection  []? Redness []? Drainage  []? Cultured []? Swelling []? Pain                    [x]? Medical Aseptic [x]? Prep Dressing Changed                  []? Clotted []? Patent []? Flows: [x]? Good []? Poor []? Reversed                 If Access Problem Dr. Shelbie Montoya: []? Yes []? No    Date:_____  []? N/A[]? GRAFT/FISTULA ACCESS:  [x]? N/A  []? RIGHT  []? LEFT  []? UE   []? LE       []? AVG  []? AVF []? BUTTONHOLE    []? +BRUIT/THRILL []? MEDICAL ASEPTIC PREP     []? No S/S infection  []? Redness []? Drainage  []? Cultured []? Swelling []? Pain              If Access Problem Dr. Shelbie Montoya: []? Yes []? No    Date:______ []? N/A          RO/HEMODIAYLSIS MACHINE SAFETY CHECKS- BEFORE EACH TREATMENT          [x]?  [facility]: Machine Serial #1: [id number] Serial [id number]                      []? MI: Machine Serial #2: [id number] Serial [id number]      []? MI: Machine Serial #3:  [id number]   Portuguese Serial [id number]     Alarm Test: [x]? Pass  Time__1850______  [x]? RO/Machine Log Complete    [x]?  Extracorporeal circuit Tested for integrity           Dialyzer_[id number]__ Tubing_[id number]_    Dialysate: pH__7.4_  Temp.__37___Conductivity: Meter __14__ HD Machine__13.8_   CHLORINE TESTING- BEFORE EACH TREATMENT AND EVERY 4 HOURS   Total Chlorine: [x]? Less than 0.1 ppm Time:_1845____2nd Check Time:______  (If greater than 0.1 ppm from Primary then every 30 minutes from Secondary)   TREATMENT INIATION-WITH DIALYSIS PRECAUTIONS   [x]? All Connections Secured   [x]? Saline Line Double Clamped    [x]? Venous Parameters Set [x]? Arterial Parameters Set    [x]? Prime Given 250 ml     [x]? Air Foam Detector Engaged   PRE-TREATMENT   UF Calculations: Wt to lose:_3500___ml(+) Oral:_0_ml(+)IV Meds/Fluids/Blood prods_0__ml(+) Prime/Rinse__500_ml(=)Total UF Goal__4000__mL      Tx Initiation Note: RECEIVED REPORT. PATIENT ALERT AND ORIENTED. VITAL SIGNS WNL. NAD. ALL QUESTIONS ANSWERED WITH PATIENT  SAFETY CHECKS COMPLETE. TIME OUT COMPLETE. TREATMENT INITIATED VIA __RIJ TDC___. NO CONCERNS NOTED. [x]? Time Out/Safety Check  Time:__1329___      Dialyzer cleared: [x]? Good []? Fair []? Poor     Blood Volume Processed _72.4___L   Net UF Removed _2750___mL  Post Tx Access:                  AVF/AVG: Bleeding Stop Time      Art. NA_min Chaim.__NA_min []? +bruit/thrill                              Catheter: Locking Solution  [x]? Heparin 1 ml/1000 units                                                             []? Normal Saline                                                                      Art.__1.8___ ml Chaim._1.8____ml                                                           [x]?  New caps placed         Abbreviations: AVG-arterial venous graft, AVF-arterial venous fistula, IJ-Internal Jugular,  Subcl-Subclavian, Fem-Femoral, Tx-treatment, AP/HR-apical heart rate, DFR-dialysate flow rate, BFR-blood flow rate, AP-arterial pressure, -venous pressure, UF-ultrafiltrate, TMP-transmembrane pressure, Chaim-Venous, Art-Arterial, RO-Reverse Osmosis

## 2022-06-21 NOTE — PROGRESS NOTES
D/c plan: Home w/ HH vs SNF pending medical progression. Initial assessment completed via chart review. Pt lives at home. He is an ESRD pt on dialysis; MWF at [facility]. Pt has a hx of noncompliance w/ dialysis treatment. Reason for Readmission:     Pulmonary edema         RUR Score/Risk Level:     25%    PCP: First and Last name:  Dr. Fuentes Estrella   Name of Practice:    Are you a current patient: Yes/No:    Approximate date of last visit:    Can you participate in a virtual visit with your PCP:     Is a Care Conference indicated: NO      Did you attend your follow up appointment (s): If not, why not: Yes; with nephrologist at dialysis         Resources/supports as identified by patient/family:   Sister        Top Challenges facing patient (as identified by patient/family and CM): Finances/Medication cost?     No   Transportation      Medicaid transport   Support system or lack thereof? Sister  Living arrangements? Home   Self-care/ADLs/Cognition? Yes, independent in ADLs. Current Advanced Directive/Advance Care Plan:             Plan for utilizing home health:                Transition of Care Plan:    Based on readmission, the patient's previous Plan of Care; Home    has been evaluated and/or modified. The current Transition of Care Plan is:     Home w/ [facility] vs SNF. Care Management Interventions  PCP Verified by CM: Yes (Per Chart: Dr. Pascale Salter )  Palliative Care Criteria Met (RRAT>21 & CHF Dx)?: Yes  Palliative Consult Recommended?: Yes  Mode of Transport at Discharge:  Other (see comment) (Medicaid transport. )  Transition of Care Consult (CM Consult): Home Health,SNF (Pending medical progression. )  Discharge Durable Medical Equipment: No  Physical Therapy Consult: Yes  Occupational Therapy Consult: Yes  Speech Therapy Consult: No  Support Systems: Other Family Member(s) (Sister)  Confirm Follow Up Transport: Other (see comment) (Medicaid transport. )  The Plan for Transition of Care is Related to the Following Treatment Goals : home w/ HH vs SNF.   Discharge Location  Patient Expects to be Discharged to[de-identified] Home with home health     Readmission Assessment  Number of days since last admission?: 8-30 days  Previous disposition: Home with Family  Who is being interviewed?:  (Chart review.)  What was the patient's/caregiver's perception as to why they think they needed to return back to the hospital?: Other (Comment) (Missed dialysis)  Did you see a specialist, such as Cardiac, Pulmonary, Orthopedic Physician, etc. after you left the hospital?: Yes (Nephrologist at dialysis)  Who advised the patient to return to the hospital?: Self-referral  Does the patient report anything that got in the way of taking their medications?: No

## 2022-06-21 NOTE — PROGRESS NOTES
Called for gram pos bacteremia 2/2 blood cultures not on abx  So allergic to Vanc  Started Dapto with cpk   And cefepime  Will get ID consult  Few episodes of hypoxemia  Started decadron

## 2022-06-21 NOTE — CONSULTS
[facility]  (A Division of [address] [place] [address])                                                                                                                      Marissa Rodriguez MD  Office #: - Option # [phone]  Fax #: [phone]     Date of Admission: 6/20/2022Date of Note: 6/21/2022      Reason for Referral: Evaluation and antibiotic management of bacteremia/COVID 10 Infection    Thank you for involving me in the care of this patient. Please do not hesitate to contact me on the above number if question or concern. Current Antimicrobials:    Prior Antimicrobials:  Cefepime and Vancomycin6/20 to date            Assessment- ID related:  --------------------------------------------------------------------------    · COVID 19 Infection-- doubt viral PNA  --fully vaccinared and boosted 11/2021  · Pul edema Vs PNA-- had few missed HD sessions  · GPC bacteremia-- R TDC or AVG could be source- await ID  · Occluded L AVG  · History of C.diff in the past    Other Medical Issues- Mx per respective team:  ESRD on HD  DM  R BKA  Hx of AMS with severe Hypoglycemia== recent admission      Recommendation for ID issues I am following:  ------------------------------------------------------------------------------    During interview-- no hypoxia on RA--doubt need for steroid    Monitor closely for diarrhea, send off promptly if present    Cont daptomycin IV 6mg/kg Q48  Could be real GPC sepsis=-- FU BCX until final  Repeat blood culture during HD     DC cefepime-- to minimize ABX pressure for C.diff  Vascular study on L arm-- per renal/Vascular team           HPI:  Jatinder Turpin is a 50 y.o. BLACK/ with PMH of ESRD on HD, DM. Recently admitted to [facility] with AMS and hypoglycemia. During his stay, he had underwent vascular study for occlusion on left arm, and was not able to use it, and R TDC was placed.     He had missed couple of his HD sessions and he was feeling body aches and SOB and had cough with clear mucus. He came in to ED, felt he could have PNA. Other associated symptoms include N/V and loose BM without   Had high BP to 208/101, fever to 102.1 after admission. .  Currently on Vanco and Cefepime. Reports occasional pain on L arm. No abd pain. Admission CXR with cardiomegaly w/mild pul edema. WBC at 12K, no bands           Active Hospital Problems    Diagnosis Date Noted    COVID-19 06/20/2022    Pulmonary edema 06/20/2022    Volume overload 06/20/2022    ESRD (end stage renal disease) on dialysis ([facility] 01/29/2020     Past Medical History:   Diagnosis Date    Chronic kidney disease     Diabetes ([facility]     Heart failure ([facility]     Hemodialysis patient ([facility]     Hypertension     Sickle cell trait ([facility]      Past Surgical History:   Procedure Laterality Date    HX ORTHOPAEDIC      right BKA 2017    IR DECLOT AV GRAFT PERCUTANEOUS  6/3/2022    IR INSERT NON TUNL CVC OVER 5 YRS  6/1/2022     History reviewed. No pertinent family history. Social History     Socioeconomic History    Marital status: LEGALLY      Spouse name: Not on file    Number of children: Not on file    Years of education: Not on file    Highest education level: Not on file   Occupational History    Not on file   Tobacco Use    Smoking status: Former Smoker    Smokeless tobacco: Never Used   Substance and Sexual Activity    Alcohol use: No    Drug use: No    Sexual activity: Not on file   Other Topics Concern    Not on file   Social History Narrative    Not on file     Social Determinants of Health     Financial Resource Strain:     Difficulty of Paying Living Expenses: Not on file   Food Insecurity:     Worried About [address] in the Last Year: Not on file    [address] in the Last Year: Not on file   Transportation Needs:     Lack of Transportation (Medical): Not on file    Lack of Transportation (Non-Medical):  Not on file   Physical Activity:     Days of Exercise per Week: Not on file    Minutes of Exercise per Session: Not on file   Stress:     Feeling of Stress : Not on file   Social Connections:     Frequency of Communication with Friends and Family: Not on file    Frequency of Social Gatherings with Friends and Family: Not on file    Attends Sabianist Services: Not on file    Active Member of Clubs or Organizations: Not on file    Attends Club or Organization Meetings: Not on file    Marital Status: Not on file   Intimate Partner Violence:     Fear of Current or Ex-Partner: Not on file    Emotionally Abused: Not on file    Physically Abused: Not on file    Sexually Abused: Not on file   Housing Stability:     Unable to Pay for Housing in the Last Year: Not on file    Number of Places Lived in the Last Year: Not on file    Unstable Housing in the Last Year: Not on file       Allergies:  Penicillins and Vancomycin     Medications:  Current Facility-Administered Medications   Medication Dose Route Frequency    DAPTOmycin (CUBICIN) 350 mg in 0.9% sodium chloride 7 mL IV Syringe  4 mg/kg IntraVENous Q24H    cefepime (MAXIPIME) 1 g in 0.9% sodium chloride (MBP/ADV) 50 mL MBP  1 g IntraVENous Q8H    acetaminophen (TYLENOL) tablet 650 mg  650 mg Oral Q6H PRN    Or    acetaminophen (TYLENOL) suppository 650 mg  650 mg Rectal Q6H PRN    guaiFENesin-dextromethorphan (ROBITUSSIN DM) 100-10 mg/5 mL syrup 5 mL  5 mL Oral Q4H PRN    dexamethasone (DECADRON) 4 mg/mL injection 6 mg  6 mg IntraVENous Q24H    sodium chloride (NS) flush 5-10 mL  5-10 mL IntraVENous PRN    carvediloL (COREG) tablet 12.5 mg  12.5 mg Oral BID WITH MEALS    calcitRIOL (ROCALTROL) capsule 0.25 mcg  0.25 mcg Oral Q MON, WED & FRI    sevelamer carbonate (RENVELA) tab 1,600 mg  1,600 mg Oral TID WITH MEALS    aspirin delayed-release tablet 81 mg  81 mg Oral DAILY    cyanocobalamin tablet 1,000 mcg  1,000 mcg Oral DAILY    doxazosin (CARDURA) tablet 2 mg  2 mg Oral DAILY    furosemide (LASIX) tablet 80 mg  80 mg Oral BID    isosorbide mononitrate ER (IMDUR) tablet 60 mg  60 mg Oral DAILY    pantoprazole (PROTONIX) tablet 40 mg  40 mg Oral ACB    sodium bicarbonate tablet 650 mg  650 mg Oral TID    glucose chewable tablet 16 g  16 g Oral PRN    glucagon (GLUCAGEN) injection 1 mg  1 mg IntraMUSCular PRN    insulin lispro (HUMALOG) injection   SubCUTAneous AC&HS    dextrose 10% infusion 0-250 mL  0-250 mL IntraVENous PRN    hydrALAZINE (APRESOLINE) 20 mg/mL injection 20 mg  20 mg IntraVENous Q6H PRN    heparin (porcine) injection 5,000 Units  5,000 Units SubCUTAneous Q8H    heparin (porcine) 1,000 unit/mL injection 3,600 Units  3,600 Units Hemodialysis DIALYSIS PRN    acetaminophen (TYLENOL) tablet 650 mg  650 mg Oral Q6H PRN        ROS:  Pertinent items are noted in the History of Present Illness. Physical Exam:    Temp (24hrs), Av.5 °F (37.5 °C), Min:98.3 °F (36.8 °C), Max:102.1 °F (38.9 °C)    Visit Vitals  BP (!) 153/85   Pulse 87   Temp 98.3 °F (36.8 °C)   Resp 18   Wt 83.9 kg (185 lb)   SpO2 97%   BMI 28.98 kg/m²     R chest  TDC in place       GEN: WD Chronically ill looking - on RA during exam, sats >93%,  not in resp distress. HEENT: Unicteric. EOMI intact  No neck swelling  CHEST: Non laboured breathing. CTA  CVS:RRR, no mur/gallop  ABD: Obese/soft. Non tender. Non distended  YOVANY: Deferred  EXT: No apparent swelling or redness on UE/LE joints. R BKA  Skin: Dry and intact. No rash, no redness. CNS: A, OX3. Moves all extremity. CN grossly ok.       Microbiology  All Micro Results     Procedure Component Value Units Date/Time    CULTURE, BLOOD [[id number]]  (Abnormal) Collected: 22 0960    Order Status: Completed Specimen: Blood Updated: 22 3410     Special Requests: NO SPECIAL REQUESTS        GRAM STAIN       ANAEROBIC BOTTLE GRAM POSITIVE COCCI IN CLUSTERS                  SMEAR CALLED TO AND CORRECTLY REPEATED BY: [facility] TAYE RN 3N ON 6/21/22 AT 0531 TO TMB. GRAM POSITIVE COCCI IN CLUSTERS AEROBIC BOTTLE                  SMEAR CALLED TO AND CORRECTLY REPEATED BY: [facility], RN 3N, TO [facility] AT 1441 6/21/2022           Culture result:       GRAM POSITIVE COCCI IN CLUSTERS GROWING IN THE ANAEROBIC BOTTLE SITE = RT ARM          CULTURE, BLOOD [[id number]]  (Abnormal) Collected: 06/20/22 0915    Order Status: Completed Specimen: Blood Updated: 06/21/22 1422     Special Requests: NO SPECIAL REQUESTS        GRAM STAIN       ANAEROBIC BOTTLE GRAM POSITIVE COCCI IN CLUSTERS                  SMEAR CALLED TO AND CORRECTLY REPEATED BY: DAVIS Ko RN 3N ON 6/21/22 AT 0531 TO TMB. GRAM POSITIVE COCCI IN CLUSTERS AEROBIC BOTTLE                  SMEAR CALLED TO AND CORRECTLY REPEATED BY: ELISA TOLLIVER RN 3N TO [address] [facility] AT 0788 ON 6/21/22.            Culture result:       GRAM POSITIVE COCCI IN CLUSTERS GROWING IN THE ANAEROBIC BOTTLE No Site Indicated          CULTURE, BLOOD [[id number]] Collected: 06/21/22 0244    Order Status: Completed Specimen: Blood Updated: 06/21/22 0726     Special Requests: NO SPECIAL REQUESTS        Culture result: NO GROWTH AFTER 4 HOURS       CULTURE, BLOOD [[id number]] Collected: 06/21/22 0253    Order Status: Completed Specimen: Blood Updated: 06/21/22 0726     Special Requests: NO SPECIAL REQUESTS        Culture result: NO GROWTH AFTER 4 HOURS       LEGIONELLA PNEUMOPHILA AG, URINE [[id number]]     Order Status: Sent Specimen: Urine     STREP PNEUMO AG, URINE [[id number]]     Order Status: Sent Specimen: Urine     CULTURE, RESPIRATORY/SPUTUM/BRONCH [place] STAIN [[id number]]     Order Status: Sent Specimen: Sputum     COVID-19 RAPID TEST [[id number]]  (Abnormal) Collected: 06/20/22 0925    Order Status: Completed Specimen: Nasopharyngeal Updated: 06/20/22 1013     Specimen source Nasopharyngeal        COVID-19 rapid test Detected        Comment:      The specimen is POSITIVE for SARS-CoV-2, the novel coronavirus associated with COVID-19. This test has been authorized by the FDA under an Emergency Use Authorization (EUA) for use by authorized laboratories. Fact sheet for Healthcare Providers: ConventionUpdate.co.nz  Fact sheet for Patients: ConventionUpdate.co.nz       Methodology: Isothermal Nucleic Acid Amplification  CALLED TO AND CORRECTLY REPEATED BY:  DR TIAN ED 6/20/22 AT 1013 TO [facility]         INFLUENZA A & B AG (RAPID TEST) [[id number]] Collected: 06/20/22 0925    Order Status: Completed Specimen: Nasopharyngeal from Nasal washing Updated: 06/20/22 1012     Influenza A Antigen Negative        Comment: A negative result does not exclude influenza virus infection, seasonal or H1N1 due to suboptimal sensitivity. If influenza is circulating in your community, a diagnosis of influenza should be considered based on a patients clinical presentation and empiric antiviral treatment should be considered, if indicated. Influenza B Antigen Negative              Lab results:    Chemistry  Recent Labs     06/21/22 0244 06/20/22  0915   GLU 75 76    132*   K 3.8 4.7   CL 99* 97*   CO2 27 24   BUN 35* 67*   CREA 9.32* 16.40*   CA 8.4* 8.2*   AGAP 10 11   BUCR 4* 4*   AP  --  69   TP  --  7.5   ALB  --  2.5*   GLOB  --  5.0*   AGRAT  --  0.5*       CBC w/ Diff  Recent Labs     06/21/22 0244 06/20/22  0915   WBC 12.1 12.8   RBC 2.95* 2.86*   HGB 7.4* 7.2*   HCT 22.1* 21.9*    327   GRANS  --  83*   LYMPH  --  5*   EOS  --  1       Imaging: report reviewed and as posted by radiologist   Results from Hospital Encounter encounter on 06/01/22    CT HEAD WO CONT    Narrative  EXAM: CT head    INDICATION: Confusion. COMPARISON: 8/29/2021    TECHNIQUE: Axial CT imaging of the head was performed without intravenous  contrast. Standard multiplanar coronal and sagittal reformatted images were  obtained and are included in interpretation.     One or more dose reduction techniques were used on this CT: automated exposure  control, adjustment of the mAs and/or kVp according to patient size, and  iterative reconstruction techniques. The specific techniques used on this CT  exam have been documented in the patient's electronic medical record. Digital  Imaging and Communications in Medicine (DICOM) format image data are available  to nonaffiliated external healthcare facilities or entities on a secure, media  free, reciprocally searchable basis with patient authorization for at least a  12-month period after this study. _______________    FINDINGS:    BRAIN AND POSTERIOR FOSSA: Right central arden hypodensity, new from prior study. No evidence of acute large vessel transcortical infarct or acute parenchymal  hemorrhage. No midline shift or hydrocephalus. EXTRA-AXIAL SPACES AND MENINGES: There are no abnormal extra-axial fluid  collections. CALVARIUM: Intact. SINUSES: Clear. OTHER: None.    _______________    Impression  Right central arden hypodensity, new from prior study, age indeterminate infarct. Correlate clinically.

## 2022-06-21 NOTE — CONSULTS
Vascular Surgery Consult      Impression:     Non-functional LUE AVG with possible infection  Gram positive bacteremia  ESRD on hemodialysis    Plan:     - Obtain a Left UE dialysis access PVL to eval for fluid collection/infection  - May need excision/partial excision of AVG later this week  - Continue HD via his R IJ [facility] for now    Reason for consultation: ? LUE AVG infection    HPI:  Chanel Knutson is a 50year old gentleman who presented to the ED with fever, congestion, cough, shortness of breath, nausea, vomiting, diarrhea. He was found to have COVID-19. Pt has a LUE AVG that is thrombosed and no longer functional. During his last admission a thrombectomy was attempted but his access was not salvageable. In the interim, he has developed pain and reports that the distal aneurysmal portion is becoming larger. Blood cultures drawn yesterday have GPCs in 2 of the 4 bottles. Pt is currently dialyzing via a R IJ TDC. Patient Active Problem List   Diagnosis Code    Iron deficiency anemia D50.9    Other restrictive cardiomyopathy ([address] I42.5    ESRD (end stage renal disease) on dialysis ([address] N18.6, Z99.2    HTN (hypertension) I10    Hx of BKA, right ([address] Z89.511    Uremia N19    Non-compliance with renal dialysis ([address] Z91.15    Hypertension I10    COVID-19 U07.1    Pulmonary edema J81.1    Volume overload E87.70       Past Medical History:   Diagnosis Date    Chronic kidney disease     Diabetes ([address]     Heart failure ([address]     Hemodialysis patient ([address]     Hypertension     Sickle cell trait ([address]      Past Surgical History:   Procedure Laterality Date    HX ORTHOPAEDIC      right BKA 2017    IR DECLOT AV GRAFT PERCUTANEOUS  6/3/2022    IR INSERT NON TUNL CVC OVER 5 YRS  6/1/2022     [unfilled]  History reviewed. No pertinent family history.       Allergies   Allergen Reactions    Penicillins Rash    Vancomycin Other (comments)     kayla syndrome       [unfilled]    Home Medications:     Prior to Admission medications    Medication Sig Start Date End Date Taking? Authorizing Provider   carvediloL (COREG) 12.5 mg tablet Take 1 Tablet by mouth two (2) times daily (with meals) for 30 days. 6/7/22 7/7/22  Mitra Pretty MD   furosemide (Lasix) 80 mg tablet Take 1 Tablet by mouth two (2) times a day. 6/7/22   Mitra Pretty MD   isosorbide mononitrate ER (IMDUR) 60 mg CR tablet Take 1 Tablet by mouth daily. 6/7/22   Mitra Pretty MD   cyanocobalamin 1,000 mcg tablet Take 1 Tablet by mouth daily. 6/7/22   Mitra Pretty MD   aspirin delayed-release 81 mg tablet Take 1 Tablet by mouth daily. 9/7/21   Mara Perez DO   doxazosin (CARDURA) 4 mg tablet Take 2 mg by mouth daily. Provider, Historical   sodium hypochlorite 0.0125% (DAKINS SOLUTION) Apply 1 mL to affected area two (2) times a week. Provider, Historical   omeprazole (PRILOSEC) 40 mg capsule Take 40 mg by mouth daily. Provider, Historical   sevelamer carbonate (RENVELA) 800 mg tab tab Take 800 mg by mouth three (3) times daily (with meals). Provider, Historical   sodium bicarbonate 650 mg tablet Take 650 mg by mouth three (3) times daily. Provider, Historical       Review of Systems:     Denies active c/o chest pain or shortness of breath at rest.  No known clotting or bleeding diathesis.       Physical Assessment:     Visit Vitals  BP (!) 154/76 (BP 1 Location: Right lower arm, BP Patient Position: At rest)   Pulse 87   Temp 99.4 °F (37.4 °C)   Resp 18   Wt 71.4 kg (157 lb 6.4 oz)   SpO2 95%   BMI 24.65 kg/m²     Neuro: Alert and oriented  CV: Regular rate  Resp: normal effort  GI: soft, non-tender, non-distended  MSK: No thrill in LUE AVG, palpable radial and brachial arteries  Skin: warm, dry, LUE skin intact but there is erythema, and possible fluid collection in the distal upper arm overlying the AVG     Basic Metabolic Profile  Lab Results   Component Value Date     06/21/2022    CO2 27 06/21/2022    BUN 35 (H) 06/21/2022       Jossy ALCANTAR [address] [place] [address] [facility]    I saw and examined the patient today. And agree with above findings. The patient does have purulent discharge coming out of the arterial anastomosis area of her brachial axillary straight graft. Clinically is acting as infected I do recommend excision of the left upper extremity AV graft with arterial repair along with a vein patch. The patient has a palpable pulse on the radial artery on the left side. I did discuss with the patient that we cannot just exclude that area the graft has to be excised of the artery and the artery has to be patched by piece of vein. He understand I discussed the case with Dr. Jannette Rosenberg we will plan on hemodialysis today take the tunneled dialysis catheter out give him a line holiday and then put a temporary in the groin in the same time with surgery of excising the AV graft. We will update the timing of surgery. All the patient's questions about left upper extremity AV graft excision and repair of the brachial artery and possible vein repair was discussed with him in details and all his questions were answered.       Diann Bansal MD

## 2022-06-21 NOTE — PROGRESS NOTES
Problem: Falls - Risk of  Goal: *Absence of Falls  Description: Document Mena Lowethal Fall Risk and appropriate interventions in the flowsheet. Outcome: Progressing Towards Goal  Note: Fall Risk Interventions:  Mobility Interventions: Patient to call before getting OOB,Utilize walker, cane, or other assistive device,Bed/chair exit alarm         Medication Interventions: Teach patient to arise slowly,Patient to call before getting OOB,Bed/chair exit alarm    Elimination Interventions: Call light in reach,Patient to call for help with toileting needs,Toileting schedule/hourly rounds,Bed/chair exit alarm    History of Falls Interventions: Bed/chair exit alarm,Evaluate medications/consider consulting pharmacy         Problem: Patient Education: Go to Patient Education Activity  Goal: Patient/Family Education  Outcome: Progressing Towards Goal     Problem: Pressure Injury - Risk of  Goal: *Prevention of pressure injury  Description: Document Tucker Scale and appropriate interventions in the flowsheet.   Outcome: Progressing Towards Goal  Note: Pressure Injury Interventions:       Moisture Interventions: Apply protective barrier, creams and emollients,Absorbent underpads,Check for incontinence Q2 hours and as needed,Minimize layers,Limit adult briefs    Activity Interventions: Increase time out of bed,Pressure redistribution bed/mattress(bed type)    Mobility Interventions: HOB 30 degrees or less,Pressure redistribution bed/mattress (bed type),PT/OT evaluation    Nutrition Interventions: Document food/fluid/supplement intake,Discuss nutritional consult with provider,Offer support with meals,snacks and hydration    Friction and Shear Interventions: Apply protective barrier, creams and emollients,HOB 30 degrees or less,Lift sheet,Lift team/patient mobility team,Minimize layers                Problem: Patient Education: Go to Patient Education Activity  Goal: Patient/Family Education  Outcome: Progressing Towards Goal Problem: Airway Clearance - Ineffective  Goal: Achieve or maintain patent airway  Outcome: Progressing Towards Goal     Problem: Gas Exchange - Impaired  Goal: Absence of hypoxia  Outcome: Progressing Towards Goal  Goal: Promote optimal lung function  Outcome: Progressing Towards Goal     Problem: Breathing Pattern - Ineffective  Goal: Ability to achieve and maintain a regular respiratory rate  Outcome: Progressing Towards Goal     Problem:  Body Temperature -  Risk of, Imbalanced  Goal: Ability to maintain a body temperature within defined limits  Outcome: Progressing Towards Goal  Goal: Will regain or maintain usual level of consciousness  Outcome: Progressing Towards Goal  Goal: Complications related to the disease process, condition or treatment will be avoided or minimized  Outcome: Progressing Towards Goal     Problem: Isolation Precautions - Risk of Spread of Infection  Goal: Prevent transmission of infectious organism to others  Outcome: Progressing Towards Goal     Problem: Nutrition Deficits  Goal: Optimize nutrtional status  Outcome: Progressing Towards Goal     Problem: Risk for Fluid Volume Deficit  Goal: Maintain normal heart rhythm  Outcome: Progressing Towards Goal  Goal: Maintain absence of muscle cramping  Outcome: Progressing Towards Goal  Goal: Maintain normal serum potassium, sodium, calcium, phosphorus, and pH  Outcome: Progressing Towards Goal     Problem: Loneliness or Risk for Loneliness  Goal: Demonstrate positive use of time alone when socialization is not possible  Outcome: Progressing Towards Goal     Problem: Fatigue  Goal: Verbalize increase energy and improved vitality  Outcome: Progressing Towards Goal     Problem: Patient Education: Go to Patient Education Activity  Goal: Patient/Family Education  Outcome: Progressing Towards Goal     Problem: Chronic Renal Failure  Goal: *Fluid and electrolytes stabilized  Outcome: Progressing Towards Goal     Problem: Patient Education: Go to Patient Education Activity  Goal: Patient/Family Education  Outcome: Progressing Towards Goal     Problem: Risk for Spread of Infection  Goal: Prevent transmission of infectious organism to others  Description: Prevent the transmission of infectious organisms to other patients, staff members, and visitors.   Outcome: Progressing Towards Goal     Problem: Patient Education:  Go to Education Activity  Goal: Patient/Family Education  Outcome: Progressing Towards Goal

## 2022-06-22 ENCOUNTER — APPOINTMENT (OUTPATIENT)
Dept: NON INVASIVE DIAGNOSTICS | Age: 52
DRG: 252 | End: 2022-06-22
Attending: INTERNAL MEDICINE
Payer: MEDICARE

## 2022-06-22 PROBLEM — A41.9 SEPSIS (HCC): Status: ACTIVE | Noted: 2022-06-22

## 2022-06-22 PROBLEM — T82.7XXA ARTERIOVENOUS GRAFT INFECTION (HCC): Status: ACTIVE | Noted: 2022-06-22

## 2022-06-22 PROBLEM — R78.81 BACTEREMIA: Status: ACTIVE | Noted: 2022-06-22

## 2022-06-22 LAB
25(OH)D3 SERPL-MCNC: 8.1 NG/ML (ref 30–100)
ACC. NO. FROM MICRO ORDER, ACCP: ABNORMAL
ACINETOBACTER CALCOACETICUS-BAUMANII COMPLEX, ACBCX: NOT DETECTED
ANION GAP SERPL CALC-SCNC: 9 MMOL/L (ref 3–18)
BACTEROIDES FRAGILIS, BFRA: NOT DETECTED
BIOFIRE COMMENT, BCIDPF: ABNORMAL
BUN SERPL-MCNC: 48 MG/DL (ref 7–18)
BUN/CREAT SERPL: 4 (ref 12–20)
C GLABRATA DNA VAG QL NAA+PROBE: NOT DETECTED
CALCIUM SERPL-MCNC: 8.9 MG/DL (ref 8.5–10.1)
CANDIDA ALBICANS: NOT DETECTED
CANDIDA AURIS, CAAU: NOT DETECTED
CANDIDA KRUSEI, CKRP: NOT DETECTED
CANDIDA PARAPSILOSIS, CPAUP: NOT DETECTED
CANDIDA TROPICALIS, CTROP: NOT DETECTED
CHLORIDE SERPL-SCNC: 102 MMOL/L (ref 100–111)
CO2 SERPL-SCNC: 25 MMOL/L (ref 21–32)
CREAT SERPL-MCNC: 11.9 MG/DL (ref 0.6–1.3)
CRP SERPL-MCNC: 18.9 MG/DL (ref 0–0.3)
CRYPTO NEOFORMANS/GATTII, CRYNEG: NOT DETECTED
D DIMER PPP FEU-MCNC: 3.76 UG/ML(FEU)
ECHO AO ASC DIAM: 3.1 CM
ECHO AO ASCENDING AORTA INDEX: 1.7 CM/M2
ECHO LA DIAMETER INDEX: 2.31 CM/M2
ECHO LA DIAMETER: 4.2 CM
ECHO LA VOL 2C: 48 ML (ref 18–58)
ECHO LA VOL 4C: 87 ML (ref 18–58)
ECHO LA VOL BP: 68 ML (ref 18–58)
ECHO LA VOL/BSA BIPLANE: 37 ML/M2 (ref 16–34)
ECHO LA VOLUME AREA LENGTH: 72 ML
ECHO LA VOLUME INDEX A2C: 26 ML/M2 (ref 16–34)
ECHO LA VOLUME INDEX A4C: 48 ML/M2 (ref 16–34)
ECHO LA VOLUME INDEX AREA LENGTH: 40 ML/M2 (ref 16–34)
ECHO LV E' LATERAL VELOCITY: 5 CM/S
ECHO LV E' SEPTAL VELOCITY: 4 CM/S
ECHO LV EDV A2C: 121 ML
ECHO LV EDV A4C: 113 ML
ECHO LV EDV BP: 121 ML (ref 67–155)
ECHO LV EDV INDEX A4C: 62 ML/M2
ECHO LV EDV INDEX BP: 66 ML/M2
ECHO LV EDV NDEX A2C: 66 ML/M2
ECHO LV EJECTION FRACTION A2C: 62 %
ECHO LV EJECTION FRACTION A4C: 46 %
ECHO LV EJECTION FRACTION BIPLANE: 52 % (ref 55–100)
ECHO LV ESV A2C: 46 ML
ECHO LV ESV A4C: 61 ML
ECHO LV ESV BP: 58 ML (ref 22–58)
ECHO LV ESV INDEX A2C: 25 ML/M2
ECHO LV ESV INDEX A4C: 34 ML/M2
ECHO LV ESV INDEX BP: 32 ML/M2
ECHO LV FRACTIONAL SHORTENING: 27 % (ref 28–44)
ECHO LV GLOBAL LONGITUDINAL STRAIN (GLS): -8.2 %
ECHO LV GLOBAL LONGITUDINAL STRAIN (GLS): -8.3 %
ECHO LV GLOBAL LONGITUDINAL STRAIN (GLS): -8.3 %
ECHO LV GLOBAL LONGITUDINAL STRAIN (GLS): -8.4 %
ECHO LV INTERNAL DIMENSION DIASTOLE INDEX: 2.47 CM/M2
ECHO LV INTERNAL DIMENSION DIASTOLIC: 4.5 CM (ref 4.2–5.9)
ECHO LV INTERNAL DIMENSION SYSTOLIC INDEX: 1.81 CM/M2
ECHO LV INTERNAL DIMENSION SYSTOLIC: 3.3 CM
ECHO LV IVSD: 1.3 CM (ref 0.6–1)
ECHO LV MASS 2D: 248.4 G (ref 88–224)
ECHO LV MASS INDEX 2D: 136.5 G/M2 (ref 49–115)
ECHO LV POSTERIOR WALL DIASTOLIC: 1.5 CM (ref 0.6–1)
ECHO LV RELATIVE WALL THICKNESS RATIO: 0.67
ECHO LVOT AREA: 3.1 CM2
ECHO LVOT DIAM: 2 CM
ECHO LVOT MEAN GRADIENT: 2 MMHG
ECHO LVOT PEAK GRADIENT: 4 MMHG
ECHO LVOT PEAK VELOCITY: 1 M/S
ECHO LVOT STROKE VOLUME INDEX: 25.2 ML/M2
ECHO LVOT SV: 45.8 ML
ECHO LVOT VTI: 14.6 CM
ECHO MV A VELOCITY: 0.79 M/S
ECHO MV AREA VTI: 3.1 CM2
ECHO MV E DECELERATION TIME (DT): 216.4 MS
ECHO MV E VELOCITY: 0.62 M/S
ECHO MV E/A RATIO: 0.78
ECHO MV E/E' LATERAL: 12.4
ECHO MV E/E' RATIO (AVERAGED): 13.95
ECHO MV E/E' SEPTAL: 15.5
ECHO MV LVOT VTI INDEX: 1.03
ECHO MV MAX VELOCITY: 0.8 M/S
ECHO MV MEAN GRADIENT: 1 MMHG
ECHO MV MEAN VELOCITY: 0.6 M/S
ECHO MV PEAK GRADIENT: 2 MMHG
ECHO MV VTI: 15 CM
ECHO PV MAX VELOCITY: 1 M/S
ECHO PV MEAN GRADIENT: 2 MMHG
ECHO PV MEAN VELOCITY: 0.6 M/S
ECHO PV PEAK GRADIENT: 4 MMHG
ECHO RV FREE WALL PEAK S': 11 CM/S
ECHO RV INTERNAL DIMENSION: 3.4 CM
ECHO RV TAPSE: 1.9 CM (ref 1.7–?)
ECHO RVOT MEAN GRADIENT: 1 MMHG
ECHO RVOT PEAK GRADIENT: 2 MMHG
ECHO RVOT PEAK VELOCITY: 0.7 M/S
ECHO RVOT VTI: 15.2 CM
ENTEROBACTER CLOACAE COMPLEX, ECCP: NOT DETECTED
ENTEROBACTERALES SP. , ENBLS: NOT DETECTED
ENTEROCOCCUS FAECALIS, ENFA: NOT DETECTED
ENTEROCOCCUS FAECIUM, ENFAM: NOT DETECTED
ERYTHROCYTE [DISTWIDTH] IN BLOOD BY AUTOMATED COUNT: 18.3 % (ref 11.6–14.5)
ESCHERICHIA COLI: NOT DETECTED
GLUCOSE BLD STRIP.AUTO-MCNC: 141 MG/DL (ref 70–110)
GLUCOSE BLD STRIP.AUTO-MCNC: 159 MG/DL (ref 70–110)
GLUCOSE BLD STRIP.AUTO-MCNC: 166 MG/DL (ref 70–110)
GLUCOSE BLD STRIP.AUTO-MCNC: 168 MG/DL (ref 70–110)
GLUCOSE SERPL-MCNC: 158 MG/DL (ref 74–99)
HAEMOPHILUS INFLUENZAE, HMI: NOT DETECTED
HCT VFR BLD AUTO: 24.6 % (ref 36–48)
HGB BLD-MCNC: 8.2 G/DL (ref 13–16)
KLEBSIELLA AEROGENES, KLAE: NOT DETECTED
KLEBSIELLA OXYTOCA: NOT DETECTED
KLEBSIELLA PNEUMONIAE GROUP, KPPG: NOT DETECTED
LACTATE SERPL-SCNC: 0.9 MMOL/L (ref 0.4–2)
LISTERIA MONOCYTOGENES, LMONP: NOT DETECTED
MCH RBC QN AUTO: 25.6 PG (ref 24–34)
MCHC RBC AUTO-ENTMCNC: 33.3 G/DL (ref 31–37)
MCV RBC AUTO: 76.9 FL (ref 78–100)
MECA/C AND MREJ (METHICILLIN RESISTANT GENE), MECAC: NOT DETECTED
NEISSERIA MENINGITIDIS, NMNI: NOT DETECTED
NRBC # BLD: 0 K/UL (ref 0–0.01)
NRBC BLD-RTO: 0 PER 100 WBC
PLATELET # BLD AUTO: 348 K/UL (ref 135–420)
PMV BLD AUTO: 10 FL (ref 9.2–11.8)
POTASSIUM SERPL-SCNC: 4.2 MMOL/L (ref 3.5–5.5)
PROTEUS, PRP: NOT DETECTED
PSEUDOMONAS AERUGINOSA: NOT DETECTED
RBC # BLD AUTO: 3.2 M/UL (ref 4.35–5.65)
RESISTANT GENE SPACE, REGENE: ABNORMAL
SALMONELLA, SALMO: NOT DETECTED
SERRATIA MARCESCENS: NOT DETECTED
SODIUM SERPL-SCNC: 136 MMOL/L (ref 136–145)
STAPH EPIDERMIDIS, STEP: NOT DETECTED
STAPH LUGDUNENSIS, STALUG: NOT DETECTED
STAPHYLOCOCCUS AUREUS: DETECTED
STAPHYLOCOCCUS, STAPP: DETECTED
STENO MALTOPHILIA, STMA: NOT DETECTED
STREPTOCOCCUS , STPSP: NOT DETECTED
STREPTOCOCCUS AGALACTIAE (GROUP B): NOT DETECTED
STREPTOCOCCUS PNEUMONIAE , SPNP: NOT DETECTED
STREPTOCOCCUS PYOGENES (GROUP A), SPYOP: NOT DETECTED
WBC # BLD AUTO: 15.1 K/UL (ref 4.6–13.2)

## 2022-06-22 PROCEDURE — 87493 C DIFF AMPLIFIED PROBE: CPT

## 2022-06-22 PROCEDURE — 74011250636 HC RX REV CODE- 250/636: Performed by: STUDENT IN AN ORGANIZED HEALTH CARE EDUCATION/TRAINING PROGRAM

## 2022-06-22 PROCEDURE — 36415 COLL VENOUS BLD VENIPUNCTURE: CPT

## 2022-06-22 PROCEDURE — 80048 BASIC METABOLIC PNL TOTAL CA: CPT

## 2022-06-22 PROCEDURE — 87324 CLOSTRIDIUM AG IA: CPT

## 2022-06-22 PROCEDURE — 82962 GLUCOSE BLOOD TEST: CPT

## 2022-06-22 PROCEDURE — 74011250636 HC RX REV CODE- 250/636: Performed by: INTERNAL MEDICINE

## 2022-06-22 PROCEDURE — 90935 HEMODIALYSIS ONE EVALUATION: CPT

## 2022-06-22 PROCEDURE — 65270000046 HC RM TELEMETRY

## 2022-06-22 PROCEDURE — 87077 CULTURE AEROBIC IDENTIFY: CPT

## 2022-06-22 PROCEDURE — 74011250637 HC RX REV CODE- 250/637: Performed by: INTERNAL MEDICINE

## 2022-06-22 PROCEDURE — 85379 FIBRIN DEGRADATION QUANT: CPT

## 2022-06-22 PROCEDURE — 74011250636 HC RX REV CODE- 250/636

## 2022-06-22 PROCEDURE — 74011636637 HC RX REV CODE- 636/637: Performed by: HOSPITALIST

## 2022-06-22 PROCEDURE — 87186 SC STD MICRODIL/AGAR DIL: CPT

## 2022-06-22 PROCEDURE — 93306 TTE W/DOPPLER COMPLETE: CPT

## 2022-06-22 PROCEDURE — 74011250637 HC RX REV CODE- 250/637: Performed by: HOSPITALIST

## 2022-06-22 PROCEDURE — 86140 C-REACTIVE PROTEIN: CPT

## 2022-06-22 PROCEDURE — 74011250637 HC RX REV CODE- 250/637: Performed by: STUDENT IN AN ORGANIZED HEALTH CARE EDUCATION/TRAINING PROGRAM

## 2022-06-22 PROCEDURE — 74011000250 HC RX REV CODE- 250: Performed by: SURGERY

## 2022-06-22 PROCEDURE — 85027 COMPLETE CBC AUTOMATED: CPT

## 2022-06-22 PROCEDURE — 82306 VITAMIN D 25 HYDROXY: CPT

## 2022-06-22 PROCEDURE — 87205 SMEAR GRAM STAIN: CPT

## 2022-06-22 PROCEDURE — 74011250636 HC RX REV CODE- 250/636: Performed by: HOSPITALIST

## 2022-06-22 PROCEDURE — 0JPT0XZ REMOVAL OF TUNNELED VASCULAR ACCESS DEVICE FROM TRUNK SUBCUTANEOUS TISSUE AND FASCIA, OPEN APPROACH: ICD-10-PCS | Performed by: SURGERY

## 2022-06-22 PROCEDURE — 74011000250 HC RX REV CODE- 250: Performed by: EMERGENCY MEDICINE

## 2022-06-22 PROCEDURE — 02PY33Z REMOVAL OF INFUSION DEVICE FROM GREAT VESSEL, PERCUTANEOUS APPROACH: ICD-10-PCS | Performed by: SURGERY

## 2022-06-22 PROCEDURE — 83605 ASSAY OF LACTIC ACID: CPT

## 2022-06-22 RX ORDER — SEVELAMER CARBONATE FOR ORAL SUSPENSION 2400 MG/1
1200 POWDER, FOR SUSPENSION ORAL
Status: DISCONTINUED | OUTPATIENT
Start: 2022-06-22 | End: 2022-07-06 | Stop reason: HOSPADM

## 2022-06-22 RX ORDER — ONDANSETRON 2 MG/ML
INJECTION INTRAMUSCULAR; INTRAVENOUS
Status: COMPLETED
Start: 2022-06-22 | End: 2022-06-22

## 2022-06-22 RX ORDER — ONDANSETRON 2 MG/ML
4 INJECTION INTRAMUSCULAR; INTRAVENOUS
Status: DISCONTINUED | OUTPATIENT
Start: 2022-06-22 | End: 2022-07-06 | Stop reason: HOSPADM

## 2022-06-22 RX ADMIN — ASPIRIN 81 MG: 81 TABLET, COATED ORAL at 09:38

## 2022-06-22 RX ADMIN — DEXAMETHASONE SODIUM PHOSPHATE 6 MG: 4 INJECTION, SOLUTION INTRAMUSCULAR; INTRAVENOUS at 06:50

## 2022-06-22 RX ADMIN — LIDOCAINE HYDROCHLORIDE 200 MG: 10; .005 INJECTION, SOLUTION EPIDURAL; INFILTRATION; INTRACAUDAL; PERINEURAL at 15:04

## 2022-06-22 RX ADMIN — SODIUM BICARBONATE 650 MG: 650 TABLET ORAL at 21:53

## 2022-06-22 RX ADMIN — SODIUM CHLORIDE, PRESERVATIVE FREE 10 ML: 5 INJECTION INTRAVENOUS at 06:50

## 2022-06-22 RX ADMIN — ONDANSETRON 4 MG: 2 INJECTION INTRAMUSCULAR; INTRAVENOUS at 14:17

## 2022-06-22 RX ADMIN — DOXAZOSIN 2 MG: 1 TABLET ORAL at 09:38

## 2022-06-22 RX ADMIN — FUROSEMIDE 80 MG: 40 TABLET ORAL at 21:53

## 2022-06-22 RX ADMIN — Medication 2 UNITS: at 17:23

## 2022-06-22 RX ADMIN — HEPARIN SODIUM 5000 UNITS: 5000 INJECTION INTRAVENOUS; SUBCUTANEOUS at 14:18

## 2022-06-22 RX ADMIN — CARVEDILOL 12.5 MG: 12.5 TABLET, FILM COATED ORAL at 17:22

## 2022-06-22 RX ADMIN — SEVELAMER CARBONATE 1200 MG: 2400 POWDER, FOR SUSPENSION ORAL at 14:18

## 2022-06-22 RX ADMIN — PANTOPRAZOLE SODIUM 40 MG: 40 TABLET, DELAYED RELEASE ORAL at 06:50

## 2022-06-22 RX ADMIN — EPOETIN ALFA-EPBX 20000 UNITS: 20000 INJECTION, SOLUTION INTRAVENOUS; SUBCUTANEOUS at 21:54

## 2022-06-22 RX ADMIN — SODIUM BICARBONATE 650 MG: 650 TABLET ORAL at 09:38

## 2022-06-22 RX ADMIN — ACETAMINOPHEN 650 MG: 325 TABLET ORAL at 14:17

## 2022-06-22 RX ADMIN — Medication 2 UNITS: at 14:37

## 2022-06-22 RX ADMIN — CALCITRIOL CAPSULES 0.25 MCG 0.25 MCG: 0.25 CAPSULE ORAL at 21:57

## 2022-06-22 RX ADMIN — HEPARIN SODIUM 5000 UNITS: 5000 INJECTION INTRAVENOUS; SUBCUTANEOUS at 03:04

## 2022-06-22 RX ADMIN — CYANOCOBALAMIN TAB 1000 MCG 1000 MCG: 1000 TAB at 09:38

## 2022-06-22 RX ADMIN — FUROSEMIDE 80 MG: 40 TABLET ORAL at 09:37

## 2022-06-22 RX ADMIN — CARVEDILOL 12.5 MG: 12.5 TABLET, FILM COATED ORAL at 09:38

## 2022-06-22 RX ADMIN — SODIUM BICARBONATE 650 MG: 650 TABLET ORAL at 17:22

## 2022-06-22 NOTE — PROGRESS NOTES
TREATMENT SUMMARY   Patient in room 340 dialyzed for 3.5 hours. Tolerated tx well with without complaint or complications. Right TDC functioning without complication accessing, lines reversed due to high AP.     3500 ml UF removed with a net UF removal of 1600 ml. Report given to RIKA green RN with all questions answered. TREATMENT NOTES       Received report from Vivian Culver RN at [id number]. TX was initiated while patient was in bed without difficulty. Aseptically, the CVC ports were accesed and flushed without problem. Treatment was started without reversing the lines, lines reversed after 30 mins due AP instability. Will continue to monitor patients closely during treatment. ACUTE HEMODIALYSIS FLOW SHEET       PATIENT INFORMATION   [facility]       DR. Alston    []1st Time Acute  []Stat[x] Routine []Urgent []Chronic Unit   []Acute Room []Bedside  []ICU/CCU []ER   Isolation Precautions: [x]Dialysis[] Airborne [x]Contact [x]Droplet []Reverse    Special Considerations:_______  [] Blood Consent Verified  []N/A   Allergies:[] NKA                 [x] __Vancomycin___________ Code Status []Full Code [] DNR  [] Other_____   Diet: [] Renal [] NPO [x] Diabetic   [] Enteral Feeding [] Cardiac Diabetic: [x]Yes []No     [x]Signed Treatment Consent Verified   [x] Time Out/ Safety Check 1040   PRIMARY NURSE REPORT: FIRST INITIAL/ LAST NAME/TITLE  PRE DIALYSIS:  ELEUTERIO green                                         TIME:1015   ACCESS   CATHETER ACCESS: [] N/A  [] RIGHT  [] LEFT  [] IJ  [] SUBCL [] FEM                    [] First use X-ray  [x] Tunnel     [] Non-Tunneled      [x] No S/S infection  [] Redness [] Drainage  [] Cultured [] Swelling [] Pain                    [] Medical Aseptic [] Prep Dressing Changed                  [] Clotted [] Patent []      Flows: [] Good [] Poor [x] Reversed If Access Problem Dr. Anjelica Doan: [] Yes [] No    Date:_____  [] N/A[x]   GRAFT/FISTULA ACCESS:  [] N/A  [] RIGHT  [] LEFT  [] UE   [] LE       [x] AVG  [] AVF [] BUTTONHOLE    [] +BRUIT/THRILL [] MEDICAL ASEPTIC PREP     [] No S/S infection  [] Redness [x] Drainage  [x] Cultured [x] Swelling [] Pain              If Access Problem Dr. Anjelica Doan: [x] Yes [] No    Date:_6/22/2022_____ [] N/A    GENERAL ASSESSMENT   LUNGS:  SaO2% ____ [] Clear [] Coarse [] Crackles [] Wheezing               [x] Diminished Location: [x] RLL [] LLL [x] RUL [] KYLAH    COUGH:  [] Productive  [] Loose[x] Dry [] N/A  RESPIRATIONS: [] Easy [] Labored    THERAPY: [] RA   [] NC 2_____. L/min    Mask: [] NRB [] Venti  _____O2%                  [] Ventilator [] Intubated [] Trach [] BiPap [] CPap [] HI Flow   CARDIAC: [x] Regular [] Irregular [] Pericardial Rub [] JVD               Monitored Rhythm:______ [] N/A   EDEMA: [x] None [] Generalized [] Facial [] Pedal [] UE [] LE             [] Pitting [] 1 [] 2 [] 3 [] 4    [] Right [] Left [] Bilateral   SKIN:    [x] Warm [] Hot [] Cold  [] Dry [] Pale [] Diaphoretic              [] Flushed [] Jaundiced [] Cyanotic [] Rash [] Weeping     LOC:    [x] Alert  Oriented to: [x] Person [x] Place [x] Time             [] Confused [] Lethargic [] Medicated [] Non-responsive    GI/ABDOMEN: [] Flat [] Distended [] Soft [] Firm [] Diarrhea [] Bowel Sounds Present [] Nausea [] Vomiting    PAIN: [x] 0 [] 1 [] 2 [] 3 [] 4 [] 5 [] 6 [] 7 [] 8 [] 9 [] 10          Scale 1-10 Action/Follow Up_____   MOBILITY: [] Amb [] Amb/Assist [] Bed  [] Wheelchair    CURRENT LABS   HBsAg ONLY: Date Drawn: 06-1-22            [x] Negative [] Positive [] Unknown.      HBsAb: Date Drawn:            [] Susceptible <10 [] Immune ?10 [] Unknown   Date of Current Labs:  ATTACHED     EDUCATION   Person Educated: [x] Patient [] Other_________   Knowledge base: [] None [] Minimal [x] Substantial    Barriers to learning  [x] None _______________   Preferred method of learning: [] Written [] Oral [x] Visual [] Hands on    Topic: [] Access Care [] S&S of infection [x] Fluid Management   [] K+  [x] Procedural  [] Albumin [] Medications [] Tx Options   [] Transplant [] Diet [] Other    Teaching Tools: [x] Explain [] Demonstration [] Handout_____ [] Video______  CARE PLAN    [x] Renal Failure (Adult)  Interdisciplinary  · Fluid and electrolytes stabilized  ? Interventions  · Dehydration signs and symptoms (eg: Weight/lab monitoring; vomiting/diarrhea/urine; tenting; mucous membranes; dizziness/lethargy/irritability/confusion; weak pulse; tachycardia; blood pressure; I&O)  · Fluid overload signs and symptoms assessment (eg: Body weight increased; dyspnea; edema; hypertension; respiratory crackles/wheezing; JVD; lab monitoring; mental status changes; I&O)  · Monitor appropriate lab values  · COMPLIANCE WITH PRESCRIBED THERAPY  · ARTERIAL ACCESS SITE ASSESSMENT  · NUTRITION SCREENING  · Vital signs monitoring per assessed patient condition or unit standard  · Cardiac monitoring  · Hydration management  · Intake and output measurement  · Body weight monitoring  · Skin care  · DIALYSIS  · Nutrition Care Process per nutrition screen  · Oral hygiene care every 2 hours  · Pain management     · Outcome   ? [x] Progressing Towards Goal  ? [] Not Progressing Towards Goals  ? [] Goals Met/Resolved  ? [] Goals Not Met/ Resolved        · Patient/ Family Education  ?  Progressing Towards Goals          RO/HEMODIAYLSIS MACHINE SAFETY CHECKS- BEFORE EACH TREATMENT          [] [facility]: Machine Serial #1:  [id number]   RO Serial #1: [id number]                       [x] [facility]: Machine Serial #2:  [id number]   RO Serial #2: [id number]        [] [facility]: Machine Serial #3:  [id number]    Serial #4:[id number]    Alarm Test: [x] Pass  Time_1035_  [x] RO/Machine Log Complete    [x] Extracorporeal circuit Tested for integrity           Dialyzer__[id number]_   Tubing_21I28-11_   Dialysate: pH 7.4  Temp.36____Conductivity: Meter 14.0_ HD Machine_13.9_   CHLORINE TESTING- BEFORE EACH TREATMENT AND EVERY 4 HOURS   Total Chlorine: [x] Less than 0.1 ppm Time:_1030____2nd Check Time:_1300  (If greater than 0.1 ppm from Primary then every 30 minutes from Secondary)   TREATMENT INIATION-WITH DIALYSIS PRECAUTIONS   [x] All Connections Secured   [x] Saline Line Double Clamped    [x] Venous Parameters Set [x] Arterial Parameters Set    [x] Prime Given 250 ml     [x] Air Foam Detector Engaged   PRE-TREATMENT   UF Calculations: Wt to lose:3500____ml(+) Oral:__ml(+)IV Meds/Fluids/Blood prods___ml(+) Prime/Rinse_500__ml(=)Total UF Goal 4000_mL   Scale Type:[x] Bed scale [] Sling Scale [] Wheel Chair Scale []  Not Ordered [] [] Unable to obtain pt on stretcher/ bed scale malfunctioning      [x] Time Out/Safety Check  Time:_1040   INTRADIALYTIC MONITORING  (SEE ATTACHED FLOWSHEET)     POST TREATMENT    Time Medication Dose Volume Route    Initials                                           [facility] Signatures Title Initials Time                     Dialyzer cleared: [] Good [x] Fair [] Poor     Blood Processed _57.5__Liters    Net UF Removed _1695___mL  Post Tx Access:                  AVF/AVG: Bleeding Stop       Art.___min Chaim.____min []+bruit/thrill                Catheter: Locking Solution [] Heparin 1 ml/1000 units  [] Normal Saline                                                                               Art._____ ml Chaim._____ml  Post Assessment:              Skin: [x]Warm []Dry []Diaphoretic []Flushed [] Pale []Cyanotic            Lungs: []Clear [x]Coarse []Crackles []Wheezing             Cardiac: [x]Regular []Irregular  []Monitored rhythm____ []N/A            Edema: [x]None []General []Facial []Pedal  []UE []LE []RIGHT []LEFT            Pain: []0 [x]1 []2 []3 []4 []5 []6 []7 []8 []9 []10   POST Tx Note:Patient terminated 35 minutes early as per nephrologist order due to patient not tolerating tx well.  He has sepsis and he has been vomiting during Tx.  He was also scheduled for catheter and AVG to be pulled since they are infected               Primary Nurse Report: First initial/Last name/Title    Post Dialysis:_ELEUTERIO green_         Time:_5818_   Abbreviations: AVG-arterial venous graft, AVF-arterial venous fistula, IJ-Internal Jugular,  Subcl-Subclavian, Fem-Femoral, Tx-treatment, AP/HR-apical heart rate, DFR-dialysate flow rate, BFR-blood flow rate, AP-arterial pressure, -venous pressure, UF-ultrafiltrate, TMP-transmembrane pressure, Chaim-Venous, Art-Arterial, RO-Reverse Osmosis

## 2022-06-22 NOTE — PROGRESS NOTES
Discussed pt with Dr. Jer Barber who is requesting removal of his [facility] given positive blood cultures from 6/20/22. Will remove his [facility] today for a line holiday. Pt seen and by Dr. Sabas Davis today. 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 He will need excision graft and repair of the arterial anastamosis with vein patch, possible TDC replacement. Plan for OR this Friday with Dr. Sabas Davis. NPO at midnight prior to surgery.     Mani Sykes NP, pager [phone]  [facility]

## 2022-06-22 NOTE — PROGRESS NOTES
Bedside and Verbal shift change report given to Faisal Faust RN (oncoming nurse) by Ximena Flores RN (offgoing nurse). Report included the following information SBAR, Kardex, ED Summary, Intake/Output, MAR, Recent Results, Med Rec Status and Cardiac Rhythm NSR.       2024  Shift assessment complete  Pt resting quietly with eyes open and chest rising and falling evenly  No c/o pain or signs of distress  Bed locked and in lowest position   Call light within [address] Cardiac/Medical Night Shift Chart Audit    Chart Audit completed? YES        Bedside and Verbal shift change report given to MAURICE Navarro (oncoming nurse) by Faisal Faust RN (offgoing nurse). Report included the following information SBAR, Kardex, ED Summary, Intake/Output, MAR, Recent Results, Med Rec Status and Cardiac Rhythm NSR.

## 2022-06-22 NOTE — PROGRESS NOTES
RENAL CONSULT PROGRESS NOTE   2022    Patient:  Jeaneth Castle  :  1970  Gender:  male  MRN #:  [id number]    Reason for Consult: ESRD related care, high BP       Subjective:  He says he feels fine , diarrhea improved   BP stable , breathing stable. No shortness of breath   Discomfort in left UE over graft     Objective:    Visit Vitals  BP (!) 143/67   Pulse 79   Temp 97.8 °F (36.6 °C)   Resp 18   Wt 71.4 kg (157 lb 6.4 oz)   SpO2 100%   BMI 24.65 kg/m²       Physical Exam:    Pt awake,  alert and comfortable  Lung: no crackles     Ext: no edema in LLE, Rt BKA status   Thrombosed graft in LUE, non tender, no thrill     CNS- Oriented to time , place and person     Laboratory Data:    Lab Results   Component Value Date    BUN 48 (H) 2022    BUN 35 (H) 2022    BUN 67 (H) 2022     2022     2022     (L) 2022    CO2 25 2022    CO2 27 2022    CO2 24 2022     Lab Results   Component Value Date    WBC 15.1 (H) 2022    HGB 8.2 (L) 2022    HCT 24.6 (L) 2022     No components found for: CALCIUM, PHOSPHORUS, MAGNESIUM  Lab Results   Component Value Date    HDL 62 (H) 2022     No results found for: SPECIMENTYP, TURBIDITY, UGLU    Imaging Reveiwed:    CXR:- IMPRESSION     Cardiomegaly with mild pulmonary edema. Assessment:  Jeaneth Castle is a 54y.o. year old male  h/o ESRD on HD , non compliant with dialysis /skip multiple dialysis session every month despite knowing the risk presented with c/o  fever, congestion, cough, shortness of breath, nausea, vomiting and  Diarrhea. Clinically not in fluid overload. Had dialysis yesterday.  Blood culture growing MRSA     ESRD  Uncontrolled Hypertension  Anemia of CKD  COVID-19 infection   Bacteremia /Line infection        Plan:    Patient examined and labs reviewed this morning, will plan for dialysis today   Talked to vascular to [facility] after dialysis today for a line holiday , repeat blood culture again   Needs temporary dialysis cath until blood cx is negative   Appreciate ID and vascular  assistance , antibiotics per ID      Next HD on Friday   Intake and output   Dose all  meds for ESRD      BMD- has secondary Hyperparathyroidism  Continue  calcitriol 1 mcg MWF  sevelamer for phos binders         Uncontrolled Hypertension: BP is at target range   Resume coreg 12.5 mg BID  On doxazosine, Imdur and lasix        Anemia of CKD- Retacrit 20, 000 units s/q MWF  during HD         I have reviewed patient's record for pertinent labs, radiology and other test . I have reviewed old records. I have ordered labs, medications and radiology as necessary and indicated per patient's condition . Total time spent is approximately 36 minutes. Greater than 50 % of that time was spent in counseling and or care coordination.            Heaven Vazquez MD, MD  [facility].- Nephrology

## 2022-06-22 NOTE — PROGRESS NOTES
[facility]  (A Division of [address] [place] [address])                                                                                                                      Nate Moreno MD  Office #: [phone]- Option # [phone]  Fax #: [phone]     Date of Admission: 6/20/2022Date of Note: 6/22/2022  Reason for Referral: Evaluation and antibiotic management of bacteremia/COVID 10 Infection           Current Antimicrobials:    Prior Antimicrobials:  daptomycin  6/20 to date     Cefepime 6/20 to 6/21       Assessment- ID related:  --------------------------------------------------------------------------    · S.aurues high grade and persistent bacteremia-- 6/20 and 6/21-- suspect TDC/AVG source.  L arm AVG is opened up with drainage  · COVID 19 Infection-- doubt viral PNA  --fully vaccinared and boosted 11/2021  --not clear when viral infection was acquired  · Pul edema Vs PNA-- had few missed HD sessions  · Occluded L AVG  · History of C.diff in the past    Other Medical Issues- Mx per respective team:  ESRD on HD  DM  R BKA  Hx of AMS with severe Hypoglycemia== recent admission      Recommendation for ID issues I am following:  ------------------------------------------------------------------------------  JOANNE Renal MD and RN     cont daptomycin 6mg/kg MW and 8mg/kg Friday post HD  Removal of TDC after HD today-- joanne renal MD  Culture from L AVG site drain sent    FU ID on BCX-- streamline ABX according to ID    FU blood culture tomorrow, after [facility] is removed- order placed  Echo ordered  C.diff ordered  Monitor cpk    Consider monitoring for further hypoxia but holding off dexamethasone in light of obvious source for fever/ pul edema   --IL 6 inhibitor rx not indicated at this time and he is not a candidate for it, neither for Remdesvir    He has sepsis with GPC elevating inflammatory markers, no help in management for COVID 19 by following them-- will DC       Subjective:  Pt seen, on 1 L NC. Denies sob/productive cough. Elevated BP Noted  He has S.aurues in blood, left arm swelling opened up this morning per pt and drained pus  He has loose and frequent BM- but no abd cramps/distension    Review of systems:  No F/C/R  No N/V  No cough/sob/chest pain  No joint swelling/pain  No itching or rash       HPI:  Fox Fisher is a 50 y.o. BLACK/ with PMH of ESRD on HD, DM. Recently admitted to [facility] with AMS and hypoglycemia. During his stay, he had underwent vascular study for occlusion on left arm, and was not able to use it, and R TDC was placed. He had missed couple of his HD sessions and he was feeling body aches and SOB and had cough with clear mucus. He came in to ED, felt he could have PNA. Other associated symptoms include N/V and loose BM without   Had high BP to 208/101, fever to 102.1 after admission. .  Currently on Vanco and Cefepime. Reports occasional pain on L arm. No abd pain. Admission CXR with cardiomegaly w/mild pul edema. WBC at 12K, no bands           Active Hospital Problems    Diagnosis Date Noted    COVID-19 06/20/2022    Pulmonary edema 06/20/2022    Volume overload 06/20/2022    ESRD (end stage renal disease) on dialysis ([address] 01/29/2020     Past Medical History:   Diagnosis Date    Chronic kidney disease     Diabetes ([address]     Heart failure ([address]     [facility]od[facility]     Hypertension     Sickle cell trait ([facility]      Past Surgical History:   Procedure Laterality Date    HX ORTHOPAEDIC      right BKA 2017    IR DECLOT AV GRAFT PERCUTANEOUS  6/3/2022    IR INSERT NON TUNL CVC OVER 5 YRS  6/1/2022     History reviewed. No pertinent family history.   Social History     Socioeconomic History    Marital status: LEGALLY      Spouse name: Not on file    Number of children: Not on file    Years of education: Not on file    Highest education level: Not on file   Occupational History    Not on file   Tobacco Use    Smoking status: Former Smoker    Smokeless tobacco: Never Used   Substance and Sexual Activity    Alcohol use: No    Drug use: No    Sexual activity: Not on file   Other Topics Concern    Not on file   Social History Narrative    Not on file     Social Determinants of Health     Financial Resource Strain:     Difficulty of Paying Living Expenses: Not on file   Food Insecurity:     Worried About Running Out of Food in the Last Year: Not on file    Danii of Food in the Last Year: Not on file   Transportation Needs:     Lack of Transportation (Medical): Not on file    Lack of Transportation (Non-Medical):  Not on file   Physical Activity:     Days of Exercise per Week: Not on file    Minutes of Exercise per Session: Not on file   Stress:     Feeling of Stress : Not on file   Social Connections:     Frequency of Communication with Friends and Family: Not on file    Frequency of Social Gatherings with Friends and Family: Not on file    Attends Islam Services: Not on file    Active Member of [address] [place] [address] or Organizations: Not on file    Attends Club or Organization Meetings: Not on file    Marital Status: Not on file   Intimate Partner Violence:     Fear of Current or Ex-Partner: Not on file    Emotionally Abused: Not on file    Physically Abused: Not on file    Sexually Abused: Not on file   Housing Stability:     Unable to Pay for Housing in the Last Year: Not on file    Number of [place] in the Last Year: Not on file    Unstable Housing in the Last Year: Not on file       Allergies:  Penicillins and Vancomycin     Medications:  Current Facility-Administered Medications   Medication Dose Route Frequency    epoetin delonte-epbx (RETACRIT) injection 20,000 Units  20,000 Units SubCUTAneous Q MON, WED & FRI    acetaminophen (TYLENOL) tablet 650 mg  650 mg Oral Q6H PRN    Or    acetaminophen (TYLENOL) suppository 650 mg  650 mg Rectal Q6H PRN    guaiFENesin-dextromethorphan (ROBITUSSIN DM) 100-10 mg/5 mL syrup 5 mL  5 mL Oral Q4H PRN    dexamethasone (DECADRON) 4 mg/mL injection 6 mg  6 mg IntraVENous Q24H    [START ON 2022] DAPTOmycin (CUBICIN) 550 mg in 0.9% sodium chloride 11 mL IV Syringe  550 mg IntraVENous Q48H    sodium chloride (NS) flush 5-10 mL  5-10 mL IntraVENous PRN    carvediloL (COREG) tablet 12.5 mg  12.5 mg Oral BID WITH MEALS    calcitRIOL (ROCALTROL) capsule 0.25 mcg  0.25 mcg Oral Q MON, WED & FRI    sevelamer carbonate (RENVELA) tab 1,600 mg  1,600 mg Oral TID WITH MEALS    aspirin delayed-release tablet 81 mg  81 mg Oral DAILY    cyanocobalamin tablet 1,000 mcg  1,000 mcg Oral DAILY    doxazosin (CARDURA) tablet 2 mg  2 mg Oral DAILY    furosemide (LASIX) tablet 80 mg  80 mg Oral BID    isosorbide mononitrate ER (IMDUR) tablet 60 mg  60 mg Oral DAILY    pantoprazole (PROTONIX) tablet 40 mg  40 mg Oral ACB    sodium bicarbonate tablet 650 mg  650 mg Oral TID    glucose chewable tablet 16 g  16 g Oral PRN    glucagon (GLUCAGEN) injection 1 mg  1 mg IntraMUSCular PRN    insulin lispro (HUMALOG) injection   SubCUTAneous AC&HS    dextrose 10% infusion 0-250 mL  0-250 mL IntraVENous PRN    hydrALAZINE (APRESOLINE) 20 mg/mL injection 20 mg  20 mg IntraVENous Q6H PRN    heparin (porcine) injection 5,000 Units  5,000 Units SubCUTAneous Q8H    heparin (porcine) 1,000 unit/mL injection 3,600 Units  3,600 Units Hemodialysis DIALYSIS PRN        ROS:  Pertinent items are noted in the History of Present Illness. Physical Exam:    Temp (24hrs), Av °F (36.7 °C), Min:97.6 °F (36.4 °C), Max:99.4 °F (37.4 °C)    Visit Vitals  BP (!) 143/67   Pulse 79   Temp 97.8 °F (36.6 °C)   Resp 18   Wt 71.4 kg (157 lb 6.4 oz)   SpO2 100%   BMI 24.65 kg/m²     R chest  TDC in place       GEN: WD Chronically ill looking - on RA during exam, sats 95/96 %,  not in resp distress. HEENT: Unicteric. EOMI intact  No neck swelling  CHEST: Non laboured breathing.  CTA  CVS:RRR, no mur/gallop  ABD: Obese/soft. Non tender. Non distended  YOVANY: Deferred  EXT: No apparent swelling or redness on UE/LE joints except some drainage on lower border of AVG graft that opened up  R BKA  Skin: Dry and intact. No rash, no redness. CNS: A, OX3. Moves all extremity. CN grossly ok. Microbiology  All Micro Results     Procedure Component Value Units Date/Time    Francisco Monroe [[id number]] Collected: 06/22/22 0915    Order Status: Completed Specimen: Wound from Abscess Updated: 06/22/22 0924    C. DIFFICILE AG & TOXIN A/B [[id number]]     Order Status: Sent Specimen: Stool     CULTURE, BLOOD [[id number]]  (Abnormal) Collected: 06/20/22 0915    Order Status: Completed Specimen: Blood Updated: 06/22/22 0851     Special Requests: NO SPECIAL REQUESTS        GRAM STAIN       ANAEROBIC BOTTLE GRAM POSITIVE COCCI IN CLUSTERS                  SMEAR CALLED TO AND CORRECTLY REPEATED BY: DAVIS Ko RN 3N ON 6/21/22 AT 0531 TO TMB. GRAM POSITIVE COCCI IN CLUSTERS AEROBIC BOTTLE                  SMEAR CALLED TO AND CORRECTLY REPEATED BY: ELISA TOLLIVER RN 3N TO [address] AT 6602 ON 6/21/22. Culture result:       PROBABLE STAPHYLOCOCCUS AUREUS GROWING IN 1 OF 2 BOTTLES DRAWN SITE= RT ARM                  GRAM POSITIVE COCCI IN CLUSTERS GROWING IN THE SECOND BOTTLE SITE=RT ARM          CULTURE, BLOOD [[id number]]  (Abnormal) Collected: 06/20/22 0940    Order Status: Completed Specimen: Blood Updated: 06/22/22 0834     Special Requests: NO SPECIAL REQUESTS        GRAM STAIN       ANAEROBIC BOTTLE GRAM POSITIVE COCCI IN CLUSTERS                  SMEAR CALLED TO AND CORRECTLY REPEATED BY: DAVIS Ko RN 3N ON 6/21/22 AT 0531 TO TMB.                   GRAM POSITIVE COCCI IN CLUSTERS AEROBIC BOTTLE                  SMEAR CALLED TO AND CORRECTLY REPEATED BY: JOLENE Sage And Tommy Morrow RN 3N, TO [facility] AT 7779 6/21/2022           Culture result:       PROBABLE STAPHYLOCOCCUS AUREUS GROWING IN 1 OF 2 BOTTLES DRAWN SITE = RT ARM                  GRAM POSITIVE COCCI IN CLUSTERS GROWING IN THE SECOND BOTTLE  SITE=RT ARM          CULTURE, BLOOD [[id number]] Collected: 06/21/22 0253    Order Status: Completed Specimen: Blood Updated: 06/22/22 0500     Special Requests: NO SPECIAL REQUESTS        GRAM STAIN       GRAM POSITIVE COCCI IN CLUSTERS ANAEROBIC BOTTLE                  SMEAR CALLED TO AND CORRECTLY REPEATED BY: VICTORIANO PEDERSEN RN 3N, TO JAF AT 1847 6/21/2022                  GRAM POSITIVE COCCI IN CLUSTERS AEROBIC BOTTLE                  SMEAR CALLED TO AND CORRECTLY REPEATED BY: GUERO RODRIGUEZ RN 3N AT 9367 ON 6/22/22 TO TSH. Culture result:       CULTURE IN PROGRESS,FURTHER UPDATES TO FOLLOW                  Sent to [facility] for ID/Susceptibility if indicated.           BLOOD CULTURE ID PANEL [[id number]]  (Abnormal) Collected: 06/20/22 0940    Order Status: Completed Specimen: Blood Updated: 06/22/22 0031     Acc. no. from Micro Order [id number]     Enterococcus faecalis Not detected        Enterococcus faecium Not detected        Listeria monocytogenes Not detected        Staphylococcus Detected        Staphylococcus aureus Detected        Staph epidermidis Not detected        Staph lugdunensis Not detected        Streptococcus Not detected        Streptococcus agalactiae (Group B) Not detected        Streptococcus pneumoniae Not detected        Streptococcus pyogenes (Group A) Not detected        Acinetobacter calcoaceticus-baumanii complex Not detected        Bacteroides fragilis Not detected        Enterobacterales species Not detected        Enterobacter cloacae complex Not detected        Escherichia coli Not detected        Klebsiella aerogenes Not detected        Klebsiella oxytoca Not detected        Klebsiella pneumoniae group Not detected        Proteus Not detected        Salmonella Not detected        Serratia marcescens Not detected        Haemophilus influenzae Not detected        Neisseria meningitidis Not detected        Pseudomonas aeruginosa Not detected        Steno maltophilia Not detected        Candida albicans Not detected        Candida auris Not detected        Candida glabrata Not detected        Candida krusei Not detected        Candida parapsilosis Not detected        Candida tropicalis Not detected        Crypto neoformans/gattii Not detected        RESISTANT GENES:            mecA/C and MREJ (Methicillin resistant gene) Not detected        Comment       False positive results may rarely occur. Correlate with clinical,epidemiologic, and other laboratory findings           Comment: Please see BCID Interpretation Guide in EPIC Links       CULTURE, BLOOD [[id number]] Collected: 06/21/22 0244    Order Status: Completed Specimen: Blood Updated: 06/21/22 1849     Special Requests: NO SPECIAL REQUESTS        GRAM STAIN       GRAM POSITIVE COCCI IN CLUSTERS ANAEROBIC BOTTLE                  SMEAR CALLED TO AND CORRECTLY REPEATED BY: Ajit Johnson, RN 3N, TO [facility] AT 1847 6/21/2022           Culture result:       CULTURE IN PROGRESS,FURTHER UPDATES TO FOLLOW                  Sent to [facility] for ID/Susceptibility if indicated. LEGIONELLA PNEUMOPHILA AG, URINE [[id number]]     Order Status: Sent Specimen: Urine     STREP Nilda Bonus, URINE [[id number]]     Order Status: Sent Specimen: Urine     CULTURE, RESPIRATORY/SPUTUM/BRONCH Hull Eli [[id number]]     Order Status: Sent Specimen: Sputum     COVID-19 RAPID TEST [[id number]]  (Abnormal) Collected: 06/20/22 0925    Order Status: Completed Specimen: Nasopharyngeal Updated: 06/20/22 1013     Specimen source Nasopharyngeal        COVID-19 rapid test Detected        Comment:      The specimen is POSITIVE for SARS-CoV-2, the novel coronavirus associated with COVID-19. This test has been authorized by the FDA under an Emergency Use Authorization (EUA) for use by authorized laboratories.         Fact sheet for Healthcare Providers: ConventionUpdate.co.nz  Fact sheet for Patients: ConventionUpdate.co.nz       Methodology: Isothermal Nucleic Acid Amplification  CALLED TO AND CORRECTLY REPEATED BY:  DR TIAN ED 6/20/22 AT 1013 TO [facility]         INFLUENZA A & B AG (RAPID TEST) [[id number]] Collected: 06/20/22 0925    Order Status: Completed Specimen: Nasopharyngeal from Nasal washing Updated: 06/20/22 1012     Influenza A Antigen Negative        Comment: A negative result does not exclude influenza virus infection, seasonal or H1N1 due to suboptimal sensitivity. If influenza is circulating in your community, a diagnosis of influenza should be considered based on a patients clinical presentation and empiric antiviral treatment should be considered, if indicated. Influenza B Antigen Negative              Lab results:    Chemistry  Recent Labs     06/22/22 0227 06/21/22 0244 06/20/22  0915   * 75 76    136 132*   K 4.2 3.8 4.7    99* 97*   CO2 25 27 24   BUN 48* 35* 67*   CREA 11.90* 9.32* 16.40*   CA 8.9 8.4* 8.2*   AGAP 9 10 11   BUCR 4* 4* 4*   AP  --   --  69   TP  --   --  7.5   ALB  --   --  2.5*   GLOB  --   --  5.0*   AGRAT  --   --  0.5*       CBC w/ Diff  Recent Labs     06/22/22 0227 06/21/22 0244 06/20/22  0915   WBC 15.1* 12.1 12.8   RBC 3.20* 2.95* 2.86*   HGB 8.2* 7.4* 7.2*   HCT 24.6* 22.1* 21.9*    321 327   GRANS  --   --  83*   LYMPH  --   --  5*   EOS  --   --  1       Imaging: report reviewed and as posted by radiologist   Results from Hospital Encounter encounter on 06/01/22    CT HEAD WO CONT    Narrative  EXAM: CT head    INDICATION: Confusion. COMPARISON: 8/29/2021    TECHNIQUE: Axial CT imaging of the head was performed without intravenous  contrast. Standard multiplanar coronal and sagittal reformatted images were  obtained and are included in interpretation.     One or more dose reduction techniques were used on this CT: automated exposure  control, adjustment of the mAs and/or kVp according to patient size, and  iterative reconstruction techniques. The specific techniques used on this CT  exam have been documented in the patient's electronic medical record. Digital  Imaging and Communications in Medicine (DICOM) format image data are available  to nonaffiliated external healthcare facilities or entities on a secure, media  free, reciprocally searchable basis with patient authorization for at least a  12-month period after this study. _______________    FINDINGS:    BRAIN AND POSTERIOR FOSSA: Right central arden hypodensity, new from prior study. No evidence of acute large vessel transcortical infarct or acute parenchymal  hemorrhage. No midline shift or hydrocephalus. EXTRA-AXIAL SPACES AND MENINGES: There are no abnormal extra-axial fluid  collections. CALVARIUM: Intact. SINUSES: Clear. OTHER: None.    _______________    Impression  Right central arden hypodensity, new from prior study, age indeterminate infarct. Correlate clinically.

## 2022-06-22 NOTE — PROGRESS NOTES
Physician Progress Note      PATIENT:               Vibha Elizalde  CSN #:                  [id number]  :                       1970  ADMIT DATE:       2022 8:58 AM  DISCH DATE:  RESPONDING  PROVIDER #:        Maria Del Rosario Ga MD          QUERY TEXT:    Pt admitted with  fever/sob/chest congestion /ESRD with missed dialysis . Pt noted to have   [place] bacteremia  Patient with a TDC and non- fuctional LUE AVG. Per vasc  OP Note  \"  TDC found  Gross purulence on catheter. \" If possible, please document in the progress notes and discharge summary if you are evaluating and / or treating any of the following: The medical record reflects the following:  Risk Factors:   LUE AVG, TDC  Clinical Indicators: Positive blood cx,  Temp 102.1,WBC 15.1, C- reactive protein 23,  PER ID on - He has sepsis with GPC elevating inflammatory markers  Treatment: removal of right internal jugular vein tunneled hemodialysis catheter. Thank You  Enrique Prince RN.  CDI, CRCR  Options provided:  -- Sepsis related to findings consistent with infection in [facility] POA  -- Sepsis unrelated to  [facility]  -- Other - I will add my own diagnosis  -- Disagree - Not applicable / Not valid  -- Disagree - Clinically unable to determine / Unknown  -- Refer to Clinical Documentation Reviewer    PROVIDER RESPONSE TEXT:    See progress note    Query created by: Delicia Ko on 2022 3:12 PM      Electronically signed by:  Maria Del Rosario Ga MD 2022 5:04 PM

## 2022-06-22 NOTE — PROGRESS NOTES
Hospitalist Progress Note    Patient: Brit Mejia MRN: [id number]  CSN: [id number]    YOB: 1970  Age: 50 y.o. Sex: male    DOA: 6/20/2022 LOS:  LOS: 2 days                Assessment/Plan     Patient Active Problem List   Diagnosis Code    Iron deficiency anemia D50.9    Other restrictive cardiomyopathy ([facility] I42.5    ESRD (end stage renal disease) on dialysis ([facility] N18.6, Z99.2    HTN (hypertension) I10    Hx of BKA, right ([address] Z89.511    Uremia N19    Non-compliance with renal dialysis ([facility] Z91.15    Hypertension I10    COVID-19 U07.1    Pulmonary edema J81.1    Volume overload E87.70    Sepsis ([address] A41.9    Bacteremia R78.81    Arteriovenous graft infection ([facility] T82. 7XXA        Chief complaint :  Fever, cough, congestion  54 y.o. male with end-stage renal disease, diabetes, CHF, hypertension, s/p right BKA presents to ER with concerns of not feeling well, fever congestion cough and shortness of breath. Feeling better    Sepsis -  POA  Secondary to bacteremia  Source TDC vs AVG    Bacteremia -  Blood cultures growing GPC  ID consulted  daptomycin      LUE non functioning AVG  Possible infection  Vascular consulted  Plan for removal of AVG on Friday   [address] removed after dialysis today    Volume overload/pulmonary edema-  Resolved with Fluid removal with HD     ESRD-  Missed dialysis  Nephrology following  HD per nephrology     Hypertension-  BP better controlled  Continue with home medications.     COVID 19-  Not hypoxic, monitor     DM-  Started on sliding scale insulin, ADA diet.     Right BKA     DVT prophylaxis with heparin       Disposition : TBD    Review of systems  General: No fevers or chills. Cardiovascular: No chest pain or pressure. No palpitations. Pulmonary: No shortness of breath. Gastrointestinal: No nausea, vomiting. Physical Exam:  General: Awake, cooperative, no acute distress    HEENT: NC, Atraumatic. PERRLA, anicteric sclerae.   Lungs: CTA Bilaterally. No Wheezing/Rhonchi/Rales. Heart:  S1 S2,  No murmur, No Rubs, No Gallops  Abdomen: Soft, Non distended, Non tender.  +Bowel sounds,   Extremities: Right BKA, tender LUE AVG site  Psych:   Not anxious or agitated. Neurologic:  No acute neurological deficit. Vital signs/Intake and Output:  Visit Vitals  /61 (BP 1 Location: Right lower arm, BP Patient Position: At rest)   Pulse 86   Temp 97.5 °F (36.4 °C)   Resp 20   Ht 5' 7\" (1.702 m)   Wt 71.2 kg (157 lb)   SpO2 98%   BMI 24.59 kg/m²     Current Shift:  06/22 0701 - 06/22 1900  In: -   Out: 1695   Last three shifts:  06/20 1901 - 06/22 0700  In: 120 [P.O.:120]  Out: 2750             Labs: Results:       Chemistry Recent Labs     06/22/22 0227 06/21/22 0244 06/20/22 0915   * 75 76    136 132*   K 4.2 3.8 4.7    99* 97*   CO2 25 27 24   BUN 48* 35* 67*   CREA 11.90* 9.32* 16.40*   CA 8.9 8.4* 8.2*   AGAP 9 10 11   BUCR 4* 4* 4*   AP  --   --  69   TP  --   --  7.5   ALB  --   --  2.5*   GLOB  --   --  5.0*   AGRAT  --   --  0.5*      CBC w/Diff Recent Labs     06/22/22 0227 06/21/22 0244 06/20/22 0915   WBC 15.1* 12.1 12.8   RBC 3.20* 2.95* 2.86*   HGB 8.2* 7.4* 7.2*   HCT 24.6* 22.1* 21.9*    321 327   GRANS  --   --  83*   LYMPH  --   --  5*   EOS  --   --  1      Cardiac Enzymes Recent Labs     06/21/22 0244   CPK 33*      Coagulation Recent Labs     06/20/22 0915   PTP 17.2*   INR 1.4*   APTT 40.9*       Lipid Panel Lab Results   Component Value Date/Time    Cholesterol, total 115 06/02/2022 09:45 AM    HDL Cholesterol 62 (H) 06/02/2022 09:45 AM    LDL, calculated 46 06/02/2022 09:45 AM    VLDL, calculated 7 06/02/2022 09:45 AM    Triglyceride 35 06/02/2022 09:45 AM    CHOL/HDL Ratio 1.9 06/02/2022 09:45 AM      BNP No results for input(s): BNPP in the last 72 hours.    Liver Enzymes Recent Labs     06/20/22  0915   TP 7.5   ALB 2.5*   AP 69      Thyroid Studies Lab Results   Component Value Date/Time TSH 2.38 08/29/2021 01:20 AM        Procedures/imaging: see electronic medical records for all procedures/Xrays and details which were not copied into this note but were reviewed prior to creation of Plan

## 2022-06-22 NOTE — PROCEDURES
Tunneled Catheter Removal    Date : 6/22/2022  Pre / Post Op Dx :esrd, bacteremia    Procedure : removal of right internal jugular vein tunneled hemodialysis catheter. Surgeon : Seamus Abreu  Procedure Performed by : Sharon Oliver PA-C. Anesthesia : 1% Lidocaine with epinepherine  EBL : Minimal   Complications : None   Specimen : catheter tip   Findings:  Gross purulence on catheter. Description :    The patient was then properly identified and a time out was performed. The patient was then positioned supine. The right chest/neck area was prepped in sterile fashion. The area was anesthetized with approximately 15 ml of 1% lidocaine. Suture scissors were used to divide the sutures that held the catheter to the skin. The cuff of the tunneled catheter was then sharply dissected from the subcutaneous tissue. With a gentle pull the catheter was removed en toto. Pressure was held to the right neck area for approximately seven minutes. There was no significant bleeding. A dry sterile dressing was applied to the percutaneous exit site. The patient tolerated the procedure well. There were no immediate complications noted. The catheter tip was placed steriley into a specimen cup which was sent for C/S.       Joellen Diallo PA-C.   [phone]

## 2022-06-22 NOTE — DIABETES MGMT
Diabetes/ Glycemic Control Plan of Care  Recommendations:    Continue POC glucose monitoring ACHS + Corrective Humalog - normal sensitivity scale   If BG consistently >150 mg/dL, may consider initiating a low-dose Lantus    Assessment:  COVID+ patient with known h/o T2DM, ESRD on dialysis, HTN, CHF, right BKA. POC glucose  mg/dL over the past 24 hours - patient refused corrective Humalog. Dexamethasone 6 mg Q24 hours - anticipate further BG elevation as steroids continue. If BG consistently >150 mg/dL, may consider initiating a low-dose basal insulin. *Glucose outside the range of  mg/dl and high glycemic variability raise the risk for adverse outcomes in patients with Diabetes and COVID -19. Diabetes Care 2.11.21    DX:   1. COVID-19     2. ESRD (end stage renal disease) on dialysis ([facility] [address]     3. Hypervolemia, unspecified hypervolemia type     4. Hypertensive urgency        Recent Glucose Results:   Lab Results   Component Value Date/Time     (H) 06/22/2022 02:27 AM    GLUCPOC 141 (H) 06/22/2022 07:37 AM    GLUCPOC 144 (H) 06/21/2022 10:57 PM    GLUCPOC 229 (H) 06/21/2022 02:24 PM     IV Fluids containing dextrose: None  Steroids:   Rx Glucocorticoids (24h ago, onward)             Start     Dose Route Frequency Ordered Stop    06/21/22 0700  dexamethasone (DECADRON) 4 mg/mL injection 6 mg         6 mg IV EVERY 24 HOURS 06/21/22 0622 --               Blood glucose values: Within target range (70-180mg/dL): No  Current insulin orders:    Corrective Humalog ACHS - normal sensitivity scale  Total Daily Dose previous 24 hours =  None    Nutrition/Diet:   Active Orders   Diet    ADULT DIET Regular; 4 carb choices (60 gm/meal); Low Sodium (2 gm); Low Potassium (Less than 3000 mg/day)      Home diabetes medications:   Key Antihyperglycemic Medications     Patient is on no antihyperglycemic meds.         Plan/Goals:   Blood glucose will be within target of 70 - 180 mg/dl within 72 hours  Reinforce dietary and medication compliance at home.        Education:  [] Refer to Diabetes Education Record                       [x] Education not indicated at this time       Eligio Garcia RN, BSN, 1 [facility]  Professional   Glycemic Control Team   Phone:  [phone]  Tues - Thurs 8:30 - 4:30

## 2022-06-22 NOTE — PROGRESS NOTES
S.aurues bacteremia  Opened up drain on L arm    Plan:    Need TDC removal  AVG drain cultured  AVG removal-- Vascular consulted    Repeat blood culture after removal of TDC    Cont with daptomycin  Check C.diff if watery bowel movement-- he is c/o frequent and loose BM    Emily Martines MD  [facility])  Office #:     [phone]  [phone]   Office Fax: [phone]

## 2022-06-22 NOTE — PROGRESS NOTES
D/C plan: home w/ [facility] [place]. Pt will need Medicaid stretcher transport home. CM spoke w/ pt. He states he goes to dialysis, MWF Chair time is 1310 at [facility] on [address]. [facility] to advise of Covid + results. Pt will need medicaid transport set up for his 1st dialysis treatment s/p d/c as he states that is no longer in place. Pt states he has lost his phone, so he is using a temporary phone and he doesn't know the number. He will charge is phone in the room and write the number down. CM to f/u w/ pt prior to d/c to confirm correct phone number. Discussed HH w/ pt. He is in agreement w/ HH. FOC provided. Pt chose [facility] [place]. Referral sent. Care Management Interventions  PCP Verified by CM: Yes (Dr. Shandra Baez)  Palliative Care Criteria Met (RRAT>21 & CHF Dx)?: Yes  Palliative Consult Recommended?: Yes  Mode of Transport at Discharge:  Other (see comment) (Medicaid transport. )  Transition of Care Consult (CM Consult): Home Health (Pt is in agreement w/ HH at d/c. )  [facility] - INPATIENT: Yes  Discharge Durable Medical Equipment: No  Physical Therapy Consult: Yes  Occupational Therapy Consult: Yes  Speech Therapy Consult: No  Support Systems: Other Family Member(s) (Sister)  Confirm Follow Up Transport: Other (see comment) (Medicaid transport. )  The Plan for Transition of Care is Related to the Following Treatment Goals : [facility]  The Patient and/or Patient Representative was Provided with a Choice of Provider and Agrees with the Discharge Plan?: Yes (The pt is in agreement w/ the d/c plan. )  Freedom of Choice List was Provided with Basic Dialogue that Supports the Patient's Individualized Plan of Care/Goals, Treatment Preferences and Shares the Quality Data Associated with the Providers?: Yes ([facility] [place])  Discharge Location  Patient Expects to be Discharged to[de-identified] Home with home health

## 2022-06-23 ENCOUNTER — APPOINTMENT (OUTPATIENT)
Dept: VASCULAR SURGERY | Age: 52
DRG: 252 | End: 2022-06-23
Attending: NURSE PRACTITIONER
Payer: MEDICARE

## 2022-06-23 LAB
ANION GAP SERPL CALC-SCNC: 9 MMOL/L (ref 3–18)
BACTERIA SPEC CULT: ABNORMAL
BASOPHILS # BLD: 0 K/UL (ref 0–0.1)
BASOPHILS NFR BLD: 0 % (ref 0–2)
BUN SERPL-MCNC: 36 MG/DL (ref 7–18)
BUN/CREAT SERPL: 5 (ref 12–20)
C DIFF MOLECULAR, CDTTNP: NEGATIVE
CALCIUM SERPL-MCNC: 8.3 MG/DL (ref 8.5–10.1)
CHLORIDE SERPL-SCNC: 102 MMOL/L (ref 100–111)
CO2 SERPL-SCNC: 27 MMOL/L (ref 21–32)
CREAT SERPL-MCNC: 7.75 MG/DL (ref 0.6–1.3)
D DIMER PPP FEU-MCNC: 2.39 UG/ML(FEU)
DIFFERENTIAL METHOD BLD: ABNORMAL
EOSINOPHIL # BLD: 0 K/UL (ref 0–0.4)
EOSINOPHIL NFR BLD: 0 % (ref 0–5)
ERYTHROCYTE [DISTWIDTH] IN BLOOD BY AUTOMATED COUNT: 18.6 % (ref 11.6–14.5)
GLUCOSE BLD STRIP.AUTO-MCNC: 136 MG/DL (ref 70–110)
GLUCOSE BLD STRIP.AUTO-MCNC: 142 MG/DL (ref 70–110)
GLUCOSE BLD STRIP.AUTO-MCNC: 178 MG/DL (ref 70–110)
GLUCOSE BLD STRIP.AUTO-MCNC: 237 MG/DL (ref 70–110)
GLUCOSE SERPL-MCNC: 155 MG/DL (ref 74–99)
GRAM STN SPEC: ABNORMAL
HCT VFR BLD AUTO: 26.2 % (ref 36–48)
HGB BLD-MCNC: 8.5 G/DL (ref 13–16)
IMM GRANULOCYTES # BLD AUTO: 0.2 K/UL (ref 0–0.04)
IMM GRANULOCYTES NFR BLD AUTO: 1 % (ref 0–0.5)
INTERPRETATION: ABNORMAL
LYMPHOCYTES # BLD: 0.7 K/UL (ref 0.9–3.6)
LYMPHOCYTES NFR BLD: 5 % (ref 21–52)
MCH RBC QN AUTO: 24.9 PG (ref 24–34)
MCHC RBC AUTO-ENTMCNC: 32.4 G/DL (ref 31–37)
MCV RBC AUTO: 76.6 FL (ref 78–100)
MONOCYTES # BLD: 1 K/UL (ref 0.05–1.2)
MONOCYTES NFR BLD: 7 % (ref 3–10)
NEUTS SEG # BLD: 12.1 K/UL (ref 1.8–8)
NEUTS SEG NFR BLD: 87 % (ref 40–73)
NRBC # BLD: 0 K/UL (ref 0–0.01)
NRBC BLD-RTO: 0 PER 100 WBC
PCR REFLEX: ABNORMAL
PLATELET # BLD AUTO: 369 K/UL (ref 135–420)
PMV BLD AUTO: 10 FL (ref 9.2–11.8)
POTASSIUM SERPL-SCNC: 4.1 MMOL/L (ref 3.5–5.5)
RBC # BLD AUTO: 3.42 M/UL (ref 4.35–5.65)
SERVICE CMNT-IMP: ABNORMAL
SODIUM SERPL-SCNC: 138 MMOL/L (ref 136–145)
WBC # BLD AUTO: 14 K/UL (ref 4.6–13.2)

## 2022-06-23 PROCEDURE — 74011250636 HC RX REV CODE- 250/636: Performed by: HOSPITALIST

## 2022-06-23 PROCEDURE — 74011636637 HC RX REV CODE- 636/637: Performed by: HOSPITALIST

## 2022-06-23 PROCEDURE — 77010033678 HC OXYGEN DAILY

## 2022-06-23 PROCEDURE — 85379 FIBRIN DEGRADATION QUANT: CPT

## 2022-06-23 PROCEDURE — 74011000258 HC RX REV CODE- 258: Performed by: INTERNAL MEDICINE

## 2022-06-23 PROCEDURE — 85025 COMPLETE CBC W/AUTO DIFF WBC: CPT

## 2022-06-23 PROCEDURE — 74011250636 HC RX REV CODE- 250/636: Performed by: INTERNAL MEDICINE

## 2022-06-23 PROCEDURE — 82962 GLUCOSE BLOOD TEST: CPT

## 2022-06-23 PROCEDURE — 93990 DOPPLER FLOW TESTING: CPT

## 2022-06-23 PROCEDURE — 36415 COLL VENOUS BLD VENIPUNCTURE: CPT

## 2022-06-23 PROCEDURE — 74011250637 HC RX REV CODE- 250/637: Performed by: HOSPITALIST

## 2022-06-23 PROCEDURE — 80048 BASIC METABOLIC PNL TOTAL CA: CPT

## 2022-06-23 PROCEDURE — 65270000046 HC RM TELEMETRY

## 2022-06-23 PROCEDURE — 74011250637 HC RX REV CODE- 250/637: Performed by: STUDENT IN AN ORGANIZED HEALTH CARE EDUCATION/TRAINING PROGRAM

## 2022-06-23 RX ORDER — INSULIN GLARGINE 100 [IU]/ML
7 INJECTION, SOLUTION SUBCUTANEOUS
Status: DISCONTINUED | OUTPATIENT
Start: 2022-06-23 | End: 2022-06-30

## 2022-06-23 RX ADMIN — CYANOCOBALAMIN TAB 1000 MCG 1000 MCG: 1000 TAB at 08:54

## 2022-06-23 RX ADMIN — HEPARIN SODIUM 5000 UNITS: 5000 INJECTION INTRAVENOUS; SUBCUTANEOUS at 11:42

## 2022-06-23 RX ADMIN — FUROSEMIDE 80 MG: 40 TABLET ORAL at 08:54

## 2022-06-23 RX ADMIN — PANTOPRAZOLE SODIUM 40 MG: 40 TABLET, DELAYED RELEASE ORAL at 06:36

## 2022-06-23 RX ADMIN — Medication 7 UNITS: at 21:50

## 2022-06-23 RX ADMIN — SEVELAMER CARBONATE 1200 MG: 2400 POWDER, FOR SUSPENSION ORAL at 08:54

## 2022-06-23 RX ADMIN — ASPIRIN 81 MG: 81 TABLET, COATED ORAL at 08:59

## 2022-06-23 RX ADMIN — CEFAZOLIN 1 G: 1 INJECTION, POWDER, FOR SOLUTION INTRAMUSCULAR; INTRAVENOUS at 18:02

## 2022-06-23 RX ADMIN — Medication 4 UNITS: at 11:42

## 2022-06-23 RX ADMIN — HEPARIN SODIUM 5000 UNITS: 5000 INJECTION INTRAVENOUS; SUBCUTANEOUS at 18:02

## 2022-06-23 RX ADMIN — SODIUM BICARBONATE 650 MG: 650 TABLET ORAL at 21:01

## 2022-06-23 RX ADMIN — DOXAZOSIN 2 MG: 1 TABLET ORAL at 08:54

## 2022-06-23 RX ADMIN — SEVELAMER CARBONATE 1200 MG: 2400 POWDER, FOR SUSPENSION ORAL at 13:06

## 2022-06-23 RX ADMIN — HEPARIN SODIUM 5000 UNITS: 5000 INJECTION INTRAVENOUS; SUBCUTANEOUS at 02:41

## 2022-06-23 RX ADMIN — ISOSORBIDE MONONITRATE 60 MG: 60 TABLET, EXTENDED RELEASE ORAL at 08:54

## 2022-06-23 RX ADMIN — CARVEDILOL 12.5 MG: 12.5 TABLET, FILM COATED ORAL at 08:54

## 2022-06-23 RX ADMIN — SODIUM BICARBONATE 650 MG: 650 TABLET ORAL at 16:34

## 2022-06-23 RX ADMIN — SODIUM BICARBONATE 650 MG: 650 TABLET ORAL at 08:54

## 2022-06-23 RX ADMIN — DEXAMETHASONE SODIUM PHOSPHATE 6 MG: 4 INJECTION, SOLUTION INTRAMUSCULAR; INTRAVENOUS at 06:36

## 2022-06-23 RX ADMIN — Medication 2 UNITS: at 08:54

## 2022-06-23 RX ADMIN — SEVELAMER CARBONATE 1200 MG: 2400 POWDER, FOR SUSPENSION ORAL at 16:34

## 2022-06-23 RX ADMIN — CARVEDILOL 12.5 MG: 12.5 TABLET, FILM COATED ORAL at 16:35

## 2022-06-23 RX ADMIN — FUROSEMIDE 80 MG: 40 TABLET ORAL at 21:01

## 2022-06-23 NOTE — PROGRESS NOTES
Hospitalist Progress Note    Patient: Matthieu Shaver MRN: [id number]  CSN: [id number]    YOB: 1970  Age: 50 y.o. Sex: male    DOA: 6/20/2022 LOS:  LOS: 3 days                Assessment/Plan     Patient Active Problem List   Diagnosis Code    Iron deficiency anemia D50.9    Other restrictive cardiomyopathy ([facility] [address] I42.5    ESRD (end stage renal disease) on dialysis ([facility] [address] N18.6, Z99.2    HTN (hypertension) I10    Hx of BKA, right ([address] Z89.511    Uremia N19    Non-compliance with renal dialysis ([facility] [address] Z91.15    Hypertension I10    COVID-19 U07.1    Pulmonary edema J81.1    Volume overload E87.70    Sepsis ([facility] [address] A41.9    Bacteremia R78.81    Arteriovenous graft infection ([facility] [address] T82. 7XXA        Chief complaint :  Fever, cough, congestion  54 y.o. male with end-stage renal disease, diabetes, CHF, hypertension, s/p right BKA presents to ER with concerns of not feeling well, fever congestion cough and shortness of breath. Feeling better    Sepsis -  POA  Secondary to bacteremia  Source TDC vs AVG    Persistent Bacteremia -  Blood cultures growing MSSA  On daptomycin  ID following. LUE non functioning AVG  Suspect source of infection  Vascular consulted  Plan for removal of AVG on Friday   [facility] removed after dialysis 06/22/2022    Volume overload/pulmonary edema-  Resolved with Fluid removal with HD     ESRD-  Missed dialysis  Nephrology following  HD per nephrology     Hypertension-  BP better controlled  Continue with home medications.     COVID 19-  Not hypoxic, monitor     DM-  Started on sliding scale insulin, ADA diet.     Right BKA     DVT prophylaxis with heparin       Disposition : TBD    Review of systems  General: No fevers or chills. Cardiovascular: No chest pain or pressure. No palpitations. Pulmonary: No shortness of breath. Gastrointestinal: No nausea, vomiting. Physical Exam:  General: Awake, cooperative, no acute distress    HEENT: NC, Atraumatic.   JAMIN anicteric sclerae. Lungs: CTA Bilaterally. No Wheezing/Rhonchi/Rales. Heart:  S1 S2,  No murmur, No Rubs, No Gallops  Abdomen: Soft, Non distended, Non tender.  +Bowel sounds,   Extremities: Right BKA, tender LUE AVG site  Psych:   Not anxious or agitated. Neurologic:  No acute neurological deficit. Vital signs/Intake and Output:  Visit Vitals  BP (!) 151/63   Pulse 77   Temp 97.3 °F (36.3 °C)   Resp 16   Ht 5' 7\" (1.702 m)   Wt 71.2 kg (157 lb)   SpO2 96%   BMI 24.59 kg/m²     Current Shift:  06/23 0701 - 06/23 1900  In: 240 [P.O.:240]  Out: -   Last three shifts:  06/21 1901 - 06/23 0700  In: -   Out: 1695             Labs: Results:       Chemistry Recent Labs     06/23/22  0000 06/22/22 0227 06/21/22  0244   * 158* 75    136 136   K 4.1 4.2 3.8    102 99*   CO2 27 25 27   BUN 36* 48* 35*   CREA 7.75* 11.90* 9.32*   CA 8.3* 8.9 8.4*   AGAP 9 9 10   BUCR 5* 4* 4*      CBC w/Diff Recent Labs     06/23/22  0000 06/22/22 0227 06/21/22  0244   WBC 14.0* 15.1* 12.1   RBC 3.42* 3.20* 2.95*   HGB 8.5* 8.2* 7.4*   HCT 26.2* 24.6* 22.1*    348 321   GRANS 87*  --   --    LYMPH 5*  --   --    EOS 0  --   --       Cardiac Enzymes Recent Labs     06/21/22  0244   CPK 33*      Coagulation No results for input(s): PTP, INR, APTT, INREXT, INREXT in the last 72 hours. Lipid Panel Lab Results   Component Value Date/Time    Cholesterol, total 115 06/02/2022 09:45 AM    HDL Cholesterol 62 (H) 06/02/2022 09:45 AM    LDL, calculated 46 06/02/2022 09:45 AM    VLDL, calculated 7 06/02/2022 09:45 AM    Triglyceride 35 06/02/2022 09:45 AM    CHOL/HDL Ratio 1.9 06/02/2022 09:45 AM      BNP No results for input(s): BNPP in the last 72 hours. Liver Enzymes No results for input(s): TP, ALB, TBIL, AP in the last 72 hours.     No lab exists for component: SGOT, GPT, DBIL   Thyroid Studies Lab Results   Component Value Date/Time    TSH 2.38 08/29/2021 01:20 AM        Procedures/imaging: see electronic medical records for all procedures/Xrays and details which were not copied into this note but were reviewed prior to creation of Plan

## 2022-06-23 NOTE — PROGRESS NOTES
RENAL CONSULT PROGRESS NOTE   2022    Patient:  Liam Stephens  :  1970  Gender:  male  MRN #:  [id number]    Reason for Consult: ESRD related care, high BP       Subjective:  He says he feels little better today, no fever and nausea  BP stable , breathing stable. Pain  in left UE over graft     Objective:    Visit Vitals  BP (!) 151/63   Pulse 77   Temp 97.3 °F (36.3 °C)   Resp 16   Ht 5' 7\" (1.702 m)   Wt 71.2 kg (157 lb)   SpO2 96%   BMI 24.59 kg/m²       Physical Exam:    Pt awake,  alert and comfortable  Lung: no crackles   Ext: no edema in LLE, Rt BKA status   Thrombosed graft in LUE, ulcer in distale end , tende    CNS- Oriented to time , place and person     Laboratory Data:    Lab Results   Component Value Date    BUN 36 (H) 2022    BUN 48 (H) 2022    BUN 35 (H) 2022     2022     2022     2022    CO2 27 2022    CO2 25 2022    CO2 27 2022     Lab Results   Component Value Date    WBC 14.0 (H) 2022    HGB 8.5 (L) 2022    HCT 26.2 (L) 2022     No components found for: CALCIUM, PHOSPHORUS, MAGNESIUM  Lab Results   Component Value Date    HDL 62 (H) 2022     No results found for: SPECIMENTYP, TURBIDITY, UGLU    Imaging Reveiwed:    CXR:- IMPRESSION     Cardiomegaly with mild pulmonary edema. Assessment:  Liam Stephens is a 54y.o. year old male  h/o ESRD on HD , non compliant with dialysis /skip multiple dialysis session every month despite knowing the risk presented with c/o  fever, congestion, cough, shortness of breath, nausea, vomiting and  diarrhea. Blood and pus culture from graft growing staph aureus . TDC has been removed for line holiday     ESRD  Uncontrolled Hypertension  Anemia of CKD  COVID-19 infection   Bacteremia/Grfat infection        Plan:    Patient examined and labs reviewed this morning .  No metabolic and clinical indication of dialysis today   TDC was removed on  due to bacteremia, will have line holiday today  Appreciate vascular surgery assistance, plan is to excise infected LUE graft tomorrow and place temporary dialysis catheter   Will plan for dialysis tomorrow   Appreciate ID assistance, antibiotics per ID    Intake and output   Dose all  meds for ESRD   Renal diet, salt and potassium restriction      BMD- has secondary Hyperparathyroidism  Continue  calcitriol 1 mcg MWF  sevelamer for phos binders         Uncontrolled Hypertension: BP is at target range   Resume coreg 12.5 mg BID  On doxazosine, Imdur and lasix        Anemia of CKD- Retacrit during HD       I have reviewed patient's record for pertinent labs, radiology and other test . I have reviewed old records. I have ordered labs, medications and radiology as necessary and indicated per patient's condition . Total time spent is approximately 36 minutes. Greater than 50 % of that time was spent in counseling and or care coordination.            Manolo Vásquez MD, MD  [facility].- Nephrology

## 2022-06-23 NOTE — PROGRESS NOTES
Plan for excision of infected left arm arterial venous graft tomorrow afternoon. The plan is for left upper extremity AV graft excision with angioplasty to the left brachial artery with a vein patch. I discussed with the patient also possible placement of temporary dialysis catheter for hemodialysis after the graft excision. The risk and complication of left upper extremity ischemia losing the arm failure of operation the need of reoperation he also understands that we may take vein from his leg all from the arm depends where we have a good segment of vein. Chronic pain. Bleeding. Hematoma. No other complication were discussed with him in details and informed consent was obtained. The left upper extremity is marked and site consent was signed. Plan today for left upper extremity AV graft excision and repair of brachial artery with a vein patch.       Sabrina Wolf MD

## 2022-06-23 NOTE — DIABETES MGMT
Diabetes/ Glycemic Control Plan of Care  Recommendations:    Recommend initiating Lantus - 7 units every 24 hours, starting this AM    Assessment:   Blood sugars have remained consistently >150 mg/dL and slowly elevating over the past 24 hours, with a total of 6 units corrective Humalog administered.  this AM - recommend initiating a conservative weight-based dose of Lantus, 0.1 units/kg/day, to minimize risk of hyperglycemia. *Glucose outside the range of  mg/dl and high glycemic variability raise the risk for adverse outcomes in patients with Diabetes and COVID -19. Diabetes Care 2.11.21         Recent Glucose Results:   Lab Results   Component Value Date/Time     (H) 06/23/2022 12:00 AM    GLUCPOC 178 (H) 06/23/2022 08:30 AM    GLUCPOC 166 (H) 06/22/2022 09:52 PM    GLUCPOC 159 (H) 06/22/2022 04:52 PM          Within target range (70-180mg/dL): Yes  Current insulin orders:    Corrective Humalog ACHS - normal sensitivity scale  Total Daily Dose previous 24 hours =  6 units     Plan/Goals:   Blood glucose will remain within target of 70 - 180 mg/dl  Reinforce dietary and medication compliance at home.        Education:  [] Refer to Diabetes Education Record                       [x] Education not indicated at this time       Casey Clifford RN, BSN, 1 [facility]  Professional   Glycemic Control Team   Phone:  [phone]  Tues - Thurs 8:30 - 4:30

## 2022-06-23 NOTE — PROGRESS NOTES
21:30 Assessment completed. Lungs are clear bilat. Talking on the phone w/o difficulty. Resting quietly in bed. States My  sugar is high because he ate candy so his sugar doesn't drop in the middle of the night. \"    22:45 Shift assessment completed. See nsg flow sheet for details. 02:40 Reassessed with 0 changes noted. Resting quietly in bed between cares on his phone. 07:20 Bedside and Verbal shift change report given to [address] [place] [address] (oncoming nurse) by Lyn Weinstein RN (offgoing nurse). Report included the following information SBAR.

## 2022-06-23 NOTE — PROGRESS NOTES
[facility]  (A Division of [address] [place] [address])                                                                                                                      Caitie Dove MD  Office #: - Option # [phone]  Fax #: [phone]     Date of Admission: 6/20/2022Date of Note: 6/23/2022  Reason for Referral: Evaluation and antibiotic management of bacteremia/COVID 10 Infection       Current Antimicrobials:    Prior Antimicrobials:  daptomycin  6/20 to date     Cefepime 6/20 to 6/21       Assessment- ID related:  --------------------------------------------------------------------------    · MSSA high grade and persistent bacteremia-- 6/20 and 6/21-- suspect TDC/AVG source. L arm AVG is opened up with drainage  --[facility] removed--6/22- cath tip cx in progress  --AVG drain culture 6/22- S.aurues  · Possible IE on MV- on TTE 6/22  · COVID 19 Infection-- doubt viral PNA  --fully vaccinared and boosted 11/2021  --not clear when viral infection was acquired  · Pul edema Vs PNA-- had few missed HD sessions  · Occluded L AVG  · History of C.diff in the past    Other Medical Issues- Mx per respective team:  ESRD on HD  DM  R BKA  Hx of AMS with severe Hypoglycemia== recent admission      Recommendation for ID issues I am following:  ------------------------------------------------------------------------------  DW Dr Lori Faria, RN and Pharmacy    FU AVG graft site drain- 6/22- S aurues +, ID pending  FU cath tip--6/22: NGSF    FU bcx ordered for am  C.diff test-- Negative by PCR    DC daptomycin  Cefazolin 1 gm IV qpm    TTE with possible IE--no BRENDEN indicated at this time    AVG excision in plans             Subjective:  Pt seen, on RA.  Feels ok  Denies abd pian, less BM frequency  TTE has abn MV finding-- possible IE     Review of systems:  No F/C/R  No N/V or abd pain or distension  No cough/sob/chest pain  No joint swelling/pain  No itching or rash       HPI:  Carlo Anthony is a 50 y.o. BLACK/ with PMH of ESRD on HD, DM. Recently admitted to [facility] with AMS and hypoglycemia. During his stay, he had underwent vascular study for occlusion on left arm, and was not able to use it, and R TDC was placed. He had missed couple of his HD sessions and he was feeling body aches and SOB and had cough with clear mucus. He came in to ED, felt he could have PNA. Other associated symptoms include N/V and loose BM without   Had high BP to 208/101, fever to 102.1 after admission. .  Currently on Vanco and Cefepime. Reports occasional pain on L arm. No abd pain. Admission CXR with cardiomegaly w/mild pul edema. WBC at 12K, no bands           Active Hospital Problems    Diagnosis Date Noted    Sepsis ([facility] 06/22/2022    Bacteremia 06/22/2022    Arteriovenous graft infection ([facility] 06/22/2022    COVID-19 06/20/2022    Pulmonary edema 06/20/2022    Volume overload 06/20/2022    ESRD (end stage renal disease) on dialysis ([address] 01/29/2020     Past Medical History:   Diagnosis Date    Chronic kidney disease     Diabetes ([facility]     Heart failure ([facility]     Hemodialysis patient ([facility]     Hypertension     Sickle cell trait ([facility]      Past Surgical History:   Procedure Laterality Date    HX ORTHOPAEDIC      right BKA 2017    IR DECLOT AV GRAFT PERCUTANEOUS  6/3/2022    IR INSERT NON TUNL CVC OVER 5 YRS  6/1/2022     History reviewed. No pertinent family history.   Social History     Socioeconomic History    Marital status: LEGALLY      Spouse name: Not on file    Number of children: Not on file    Years of education: Not on file    Highest education level: Not on file   Occupational History    Not on file   Tobacco Use    Smoking status: Former Smoker    Smokeless tobacco: Never Used   Substance and Sexual Activity    Alcohol use: No    Drug use: No    Sexual activity: Not on file   Other Topics Concern    Not on file   Social History Narrative    Not on file Social Determinants of Health     Financial Resource Strain:     Difficulty of Paying Living Expenses: Not on file   Food Insecurity:     Worried About Running Out of Food in the Last Year: Not on file    Danii of Food in the Last Year: Not on file   Transportation Needs:     Lack of Transportation (Medical): Not on file    Lack of Transportation (Non-Medical):  Not on file   Physical Activity:     Days of Exercise per Week: Not on file    Minutes of Exercise per Session: Not on file   Stress:     Feeling of Stress : Not on file   Social Connections:     Frequency of Communication with Friends and Family: Not on file    Frequency of Social Gatherings with Friends and Family: Not on file    Attends Anabaptism Services: Not on file    Active Member of Clubs or Organizations: Not on file    Attends Club or Organization Meetings: Not on file    Marital Status: Not on file   Intimate Partner Violence:     Fear of Current or Ex-Partner: Not on file    Emotionally Abused: Not on file    Physically Abused: Not on file    Sexually Abused: Not on file   Housing Stability:     Unable to Pay for Housing in the Last Year: Not on file    Number of Places Lived in the Last Year: Not on file    Unstable Housing in the Last Year: Not on file       Allergies:  Penicillins and Vancomycin     Medications:  Current Facility-Administered Medications   Medication Dose Route Frequency    ceFAZolin (ANCEF) 1 g in 0.9% sodium chloride (MBP/ADV) 50 mL MBP  1 g IntraVENous Q24H    insulin glargine (LANTUS) injection 7 Units  7 Units SubCUTAneous QHS    epoetin delonte-epbx (RETACRIT) injection 20,000 Units  20,000 Units SubCUTAneous Q MON, WED & FRI    sevelamer carbonate (RENVELA) oral powder 1,200 mg  1,200 mg Oral TID WITH MEALS    ondansetron (ZOFRAN) injection 4 mg  4 mg IntraVENous Q8H PRN    acetaminophen (TYLENOL) tablet 650 mg  650 mg Oral Q6H PRN    Or    acetaminophen (TYLENOL) suppository 650 mg  650 mg Rectal Q6H PRN    guaiFENesin-dextromethorphan (ROBITUSSIN DM) 100-10 mg/5 mL syrup 5 mL  5 mL Oral Q4H PRN    dexamethasone (DECADRON) 4 mg/mL injection 6 mg  6 mg IntraVENous Q24H    sodium chloride (NS) flush 5-10 mL  5-10 mL IntraVENous PRN    carvediloL (COREG) tablet 12.5 mg  12.5 mg Oral BID WITH MEALS    calcitRIOL (ROCALTROL) capsule 0.25 mcg  0.25 mcg Oral Q MON, WED & FRI    aspirin delayed-release tablet 81 mg  81 mg Oral DAILY    cyanocobalamin tablet 1,000 mcg  1,000 mcg Oral DAILY    doxazosin (CARDURA) tablet 2 mg  2 mg Oral DAILY    furosemide (LASIX) tablet 80 mg  80 mg Oral BID    isosorbide mononitrate ER (IMDUR) tablet 60 mg  60 mg Oral DAILY    pantoprazole (PROTONIX) tablet 40 mg  40 mg Oral ACB    sodium bicarbonate tablet 650 mg  650 mg Oral TID    glucose chewable tablet 16 g  16 g Oral PRN    glucagon (GLUCAGEN) injection 1 mg  1 mg IntraMUSCular PRN    insulin lispro (HUMALOG) injection   SubCUTAneous AC&HS    dextrose 10% infusion 0-250 mL  0-250 mL IntraVENous PRN    hydrALAZINE (APRESOLINE) 20 mg/mL injection 20 mg  20 mg IntraVENous Q6H PRN    heparin (porcine) injection 5,000 Units  5,000 Units SubCUTAneous Q8H    heparin (porcine) 1,000 unit/mL injection 3,600 Units  3,600 Units Hemodialysis DIALYSIS PRN        ROS:  Pertinent items are noted in the History of Present Illness. Physical Exam:    Temp (24hrs), Av.8 °F (36.6 °C), Min:97.3 °F (36.3 °C), Max:98.1 °F (36.7 °C)    Visit Vitals  BP (!) 120/42   Pulse 74   Temp 97.8 °F (36.6 °C)   Resp 16   Ht 5' 7\" (1.702 m)   Wt 73.1 kg (161 lb 2.5 oz)   SpO2 96%   BMI 25.24 kg/m²     R chest  TDC in place--removed        GEN: WD Chronically ill looking - on RA during exam, sats 95/96 %,  not in resp distress. HEENT: Unicteric. EOMI intact  No neck swelling  CHEST: Non laboured breathing. CTA  CVS:RRR, no mur/gallop  ABD: Obese/soft. Non tender.  Non distended  YOVANY: Deferred  EXT: No apparent swelling or redness on UE/LE joints except some drainage on lower border of AVG graft that opened up  R BKA  Skin: Dry and intact. No rash, no redness. CNS: A, OX3. Moves all extremity. CN grossly ok. Microbiology  All Micro Results     Procedure Component Value Units Date/Time    C. DIFFICILE AG & TOXIN A/B [[id number]]  (Abnormal) Collected: 06/22/22 1945    Order Status: Completed Specimen: Stool from Miscellaneous sample Updated: 06/23/22 1410     PCR Reflex       See Reflex order for C. difficile (DNA)           INTERPRETATION       Indeterminate for Toxigenic C. difficile, DNA confirmation to follow. Kathy Yip DIFF MOLECULAR [[id number]] Collected: 06/22/22 1945    Order Status: Completed Specimen: Miscellaneous sample Updated: 06/23/22 1410     C Diff Molecular Negative        Comment: This specimen is negative for toxigenic C difficile by DNA amplification. Repeat testing is not recommended for confirmation, samples received within 7 days of this negative result will be rejected.        CULTURE, BLOOD [[id number]]  (Abnormal) Collected: 06/21/22 0244    Order Status: Completed Specimen: Blood Updated: 06/23/22 1336     Special Requests: NO SPECIAL REQUESTS        GRAM STAIN       GRAM POSITIVE COCCI IN CLUSTERS ANAEROBIC BOTTLE                  SMEAR CALLED TO AND CORRECTLY REPEATED BY: John Corbin RN 3N, TO [facility] ER AT 1847 6/21/2022           Culture result:       STAPHYLOCOCCUS AUREUS GROWING IN BOTH BOTTLES DRAWN No Site Indicated REFER TO [id number] FOR SENSITIVITIES          CULTURE, BLOOD [[id number]]  (Abnormal) Collected: 06/21/22 0253    Order Status: Completed Specimen: Blood Updated: 06/23/22 1335     Special Requests: NO SPECIAL REQUESTS        GRAM STAIN       GRAM POSITIVE COCCI IN CLUSTERS ANAEROBIC BOTTLE                  SMEAR CALLED TO AND CORRECTLY REPEATED BY: VICTORIANO PEDERSEN RN 3N, TO [facility] AT 3302 6/21/2022                  GRAM POSITIVE COCCI IN CLUSTERS AEROBIC BOTTLE                  SMEAR CALLED TO AND CORRECTLY REPEATED BY: GUERO RODRIGUEZ RN 3N AT 0459 ON 6/22/22 TO [facility]. Culture result:       STAPHYLOCOCCUS AUREUS GROWING IN BOTH BOTTLES DRAWN No Site Indicated REFER TO [id number] FOR SENSITIVITIES          CULTURE, Sonia Keen STAIN [[id number]] Collected: 06/22/22 1505    Order Status: Completed Specimen: Catheter Tip Updated: 06/23/22 1325     Special Requests: NO SPECIAL REQUESTS        Culture result:       CULTURE IN PROGRESS,FURTHER UPDATES TO FOLLOW          CULTURE, BLOOD [[id number]]  (Abnormal) Collected: 06/20/22 0915    Order Status: Completed Specimen: Blood Updated: 06/23/22 0944     Special Requests: NO SPECIAL REQUESTS        GRAM STAIN       ANAEROBIC BOTTLE GRAM POSITIVE COCCI IN CLUSTERS                  SMEAR CALLED TO AND CORRECTLY REPEATED BY: DAVIS Ko RN 3N ON 6/21/22 AT 0531 TO [facility]. GRAM POSITIVE COCCI IN CLUSTERS AEROBIC BOTTLE                  SMEAR CALLED TO AND CORRECTLY REPEATED BY: ELISA TOLLIVER RN 3N TO [facility] AT 7817 ON 6/21/22. Culture result:       STAPHYLOCOCCUS AUREUS GROWING IN BOTH BOTTLES DRAWN SITE= RT ARM REFER TO [id number] FOR SENSITIVITIES          CULTURE, BLOOD [[id number]]  (Abnormal)  (Susceptibility) Collected: 06/20/22 0940    Order Status: Completed Specimen: Blood Updated: 06/23/22 0943     Special Requests: NO SPECIAL REQUESTS        GRAM STAIN       ANAEROBIC BOTTLE GRAM POSITIVE COCCI IN CLUSTERS                  SMEAR CALLED TO AND CORRECTLY REPEATED BY: DAVIS Ko RN 3N ON 6/21/22 AT 0531 TO [facility].                   GRAM POSITIVE COCCI IN CLUSTERS AEROBIC BOTTLE                  SMEAR CALLED TO AND CORRECTLY REPEATED BY: [facility], RN 3N, TO [facility] AT 1441 6/21/2022           Culture result:       STAPHYLOCOCCUS AUREUS GROWING IN BOTH BOTTLES DRAWN SITE = [facility]          CULTURE, Sonia Keen STAIN [[id number]]  (Abnormal) Collected: 06/22/22 0915    Order Status: Completed Specimen: Wound from Abscess Updated: 06/23/22 9756 Special Requests: NO SPECIAL REQUESTS        GRAM STAIN OCCASIONAL WBCS SEEN               RARE GRAM POSITIVE COCCI IN PAIRS           Culture result:       HEAVY PROBABLE STAPHYLOCOCCUS AUREUS          CULTURE, CATHETER TIP [[id number]] Collected: 06/22/22 1459    Order Status: Canceled Specimen: CVP Catheter Tip     BLOOD CULTURE ID PANEL [[id number]]  (Abnormal) Collected: 06/20/22 0940    Order Status: Completed Specimen: Blood Updated: 06/22/22 0031     Acc. no. from Micro Order [id number]     Enterococcus faecalis Not detected        Enterococcus faecium Not detected        Listeria monocytogenes Not detected        Staphylococcus Detected        Staphylococcus aureus Detected        Staph epidermidis Not detected        Staph lugdunensis Not detected        Streptococcus Not detected        Streptococcus agalactiae (Group B) Not detected        Streptococcus pneumoniae Not detected        Streptococcus pyogenes (Group A) Not detected        Acinetobacter calcoaceticus-baumanii complex Not detected        Bacteroides fragilis Not detected        Enterobacterales species Not detected        Enterobacter cloacae complex Not detected        Escherichia coli Not detected        Klebsiella aerogenes Not detected        Klebsiella oxytoca Not detected        Klebsiella pneumoniae group Not detected        Proteus Not detected        Salmonella Not detected        Serratia marcescens Not detected        Haemophilus influenzae Not detected        Neisseria meningitidis Not detected        Pseudomonas aeruginosa Not detected        Steno maltophilia Not detected        Candida albicans Not detected        Candida auris Not detected        Candida glabrata Not detected        Candida krusei Not detected        Candida parapsilosis Not detected        Candida tropicalis Not detected        Crypto neoformans/gattii Not detected        RESISTANT GENES:            mecA/C and MREJ (Methicillin resistant gene) Not detected Comment       False positive results may rarely occur. Correlate with clinical,epidemiologic, and other laboratory findings           Comment: Please see BCID Interpretation Guide in EPIC Links       LEGIONELLA PNEUMOPHILA AG, URINE [[id number]]     Order Status: Sent Specimen: Urine     STREP Chyrl Necessary, URINE [[id number]]     Order Status: Sent Specimen: Urine     CULTURE, RESPIRATORY/SPUTUM/BRONCH Jann Jeffery [[id number]]     Order Status: Sent Specimen: Sputum     COVID-19 RAPID TEST [[id number]]  (Abnormal) Collected: 06/20/22 0925    Order Status: Completed Specimen: Nasopharyngeal Updated: 06/20/22 1013     Specimen source Nasopharyngeal        COVID-19 rapid test Detected        Comment:      The specimen is POSITIVE for SARS-CoV-2, the novel coronavirus associated with COVID-19. This test has been authorized by the FDA under an Emergency Use Authorization (EUA) for use by authorized laboratories. Fact sheet for Healthcare Providers: ConventionUpdate.co.nz  Fact sheet for Patients: ConventionUpdate.co.nz       Methodology: Isothermal Nucleic Acid Amplification  CALLED TO AND CORRECTLY REPEATED BY:  DR TIAN ED 6/20/22 AT 1013 TO [facility]         INFLUENZA A & B AG (RAPID TEST) [[id number]] Collected: 06/20/22 0925    Order Status: Completed Specimen: Nasopharyngeal from Nasal washing Updated: 06/20/22 1012     Influenza A Antigen Negative        Comment: A negative result does not exclude influenza virus infection, seasonal or H1N1 due to suboptimal sensitivity. If influenza is circulating in your community, a diagnosis of influenza should be considered based on a patients clinical presentation and empiric antiviral treatment should be considered, if indicated.         Influenza B Antigen Negative              Lab results:    Chemistry  Recent Labs     06/23/22  0000 06/22/22  0227 06/21/22  0244   * 158* 75    136 136   K 4.1 4.2 3.8    102 99* CO2 27 25 27   BUN 36* 48* 35*   CREA 7.75* 11.90* 9.32*   CA 8.3* 8.9 8.4*   AGAP 9 9 10   BUCR 5* 4* 4*       CBC w/ Diff  Recent Labs     06/23/22  0000 06/22/22  0227 06/21/22  0244   WBC 14.0* 15.1* 12.1   RBC 3.42* 3.20* 2.95*   HGB 8.5* 8.2* 7.4*   HCT 26.2* 24.6* 22.1*    348 321   GRANS 87*  --   --    LYMPH 5*  --   --    EOS 0  --   --        Imaging: report reviewed and as posted by radiologist   Results from [place] encounter on 06/01/22    CT HEAD WO CONT    Narrative  EXAM: CT head    INDICATION: Confusion. COMPARISON: 8/29/2021    TECHNIQUE: Axial CT imaging of the head was performed without intravenous  contrast. Standard multiplanar coronal and sagittal reformatted images were  obtained and are included in interpretation. One or more dose reduction techniques were used on this CT: automated exposure  control, adjustment of the mAs and/or kVp according to patient size, and  iterative reconstruction techniques. The specific techniques used on this CT  exam have been documented in the patient's electronic medical record. Digital  Imaging and Communications in Medicine (DICOM) format image data are available  to nonaffiliated external healthcare facilities or entities on a secure, media  free, reciprocally searchable basis with patient authorization for at least a  12-month period after this study. _______________    FINDINGS:    BRAIN AND POSTERIOR FOSSA: Right central arden hypodensity, new from prior study. No evidence of acute large vessel transcortical infarct or acute parenchymal  hemorrhage. No midline shift or hydrocephalus. EXTRA-AXIAL SPACES AND MENINGES: There are no abnormal extra-axial fluid  collections. CALVARIUM: Intact. SINUSES: Clear. OTHER: None.    _______________    Impression  Right central arden hypodensity, new from prior study, age indeterminate infarct. Correlate clinically.

## 2022-06-23 NOTE — PROGRESS NOTES
Problem: Falls - Risk of  Goal: *Absence of Falls  Description: Document Danilo Lundy Fall Risk and appropriate interventions in the flowsheet.   Outcome: Progressing Towards Goal  Note: Fall Risk Interventions:  Mobility Interventions: Communicate number of staff needed for ambulation/transfer,Patient to call before getting OOB,Utilize walker, cane, or other assistive device         Medication Interventions: Assess postural VS orthostatic hypotension,Patient to call before getting OOB,Teach patient to arise slowly    Elimination Interventions: Call light in reach,Patient to call for help with toileting needs    History of Falls Interventions: Evaluate medications/consider consulting pharmacy

## 2022-06-23 NOTE — PROGRESS NOTES
D/C Plan: Home with [facility] when medically ready, will need Medicaid stretcher transport home. During rounds pharmacy stated floraet was  currently on day 3 of a 5 day course of IV ABX. Allison Hicks CM notes plan for excision of infected left arm AV graft tomorrow. CM to continue to follow and remain available. CM spoke with ID concerning patient's need for longterm IV ABX,; patient will get the ABX with his dialysis. When he discharges the final  ABX order will need to be sent to the dialysis facility. Care Management Interventions  PCP Verified by CM: Yes (Dr. Cherelle Miner)  Palliative Care Criteria Met (RRAT>21 & CHF Dx)?: Yes  Palliative Consult Recommended?: Yes  Mode of Transport at Discharge:  Other (see comment) (Medicaid transport. )  Transition of Care Consult (CM Consult): Home Health (Pt is in agreement w/ HH at d/c. )  [address]: Yes  Discharge Durable Medical Equipment: No  Physical Therapy Consult: Yes  Occupational Therapy Consult: Yes  Speech Therapy Consult: No  Support Systems: Other Family Member(s) (Sister)  Confirm Follow Up Transport: Other (see comment) (Medicaid transport. )  The Plan for Transition of Care is Related to the Following Treatment Goals : [facility]  The Patient and/or Patient Representative was Provided with a Choice of Provider and Agrees with the Discharge Plan?: Yes (The pt is in agreement w/ the d/c plan. )  Freedom of Choice List was Provided with Basic Dialogue that Supports the Patient's Individualized Plan of Care/Goals, Treatment Preferences and Shares the Quality Data Associated with the Providers?: Yes ([address])  Discharge Location  Patient Expects to be Discharged to[de-identified] Home with home health

## 2022-06-24 ENCOUNTER — APPOINTMENT (OUTPATIENT)
Dept: GENERAL RADIOLOGY | Age: 52
DRG: 252 | End: 2022-06-24
Attending: SURGERY
Payer: MEDICARE

## 2022-06-24 ENCOUNTER — ANESTHESIA EVENT (OUTPATIENT)
Dept: SURGERY | Age: 52
DRG: 252 | End: 2022-06-24
Payer: MEDICARE

## 2022-06-24 ENCOUNTER — ANESTHESIA (OUTPATIENT)
Dept: SURGERY | Age: 52
DRG: 252 | End: 2022-06-24
Payer: MEDICARE

## 2022-06-24 LAB
ABO + RH BLD: NORMAL
ANION GAP SERPL CALC-SCNC: 8 MMOL/L (ref 3–18)
BACTERIA SPEC CULT: ABNORMAL
BASOPHILS # BLD: 0 K/UL (ref 0–0.1)
BASOPHILS NFR BLD: 0 % (ref 0–2)
BLOOD GROUP ANTIBODIES SERPL: NORMAL
BUN SERPL-MCNC: 48 MG/DL (ref 7–18)
BUN/CREAT SERPL: 5 (ref 12–20)
CALCIUM SERPL-MCNC: 8.5 MG/DL (ref 8.5–10.1)
CHLORIDE SERPL-SCNC: 99 MMOL/L (ref 100–111)
CO2 SERPL-SCNC: 28 MMOL/L (ref 21–32)
CREAT SERPL-MCNC: 9.33 MG/DL (ref 0.6–1.3)
DIFFERENTIAL METHOD BLD: ABNORMAL
EOSINOPHIL # BLD: 0 K/UL (ref 0–0.4)
EOSINOPHIL NFR BLD: 0 % (ref 0–5)
ERYTHROCYTE [DISTWIDTH] IN BLOOD BY AUTOMATED COUNT: 19.3 % (ref 11.6–14.5)
GLUCOSE BLD STRIP.AUTO-MCNC: 103 MG/DL (ref 70–110)
GLUCOSE BLD STRIP.AUTO-MCNC: 104 MG/DL (ref 70–110)
GLUCOSE BLD STRIP.AUTO-MCNC: 133 MG/DL (ref 70–110)
GLUCOSE BLD STRIP.AUTO-MCNC: 87 MG/DL (ref 70–110)
GLUCOSE SERPL-MCNC: 117 MG/DL (ref 74–99)
GRAM STN SPEC: ABNORMAL
GRAM STN SPEC: ABNORMAL
HCT VFR BLD AUTO: 27.8 % (ref 36–48)
HGB BLD-MCNC: 8.8 G/DL (ref 13–16)
IMM GRANULOCYTES # BLD AUTO: 0.6 K/UL (ref 0–0.04)
IMM GRANULOCYTES NFR BLD AUTO: 4 % (ref 0–0.5)
LYMPHOCYTES # BLD: 1.2 K/UL (ref 0.9–3.6)
LYMPHOCYTES NFR BLD: 8 % (ref 21–52)
MCH RBC QN AUTO: 24.9 PG (ref 24–34)
MCHC RBC AUTO-ENTMCNC: 31.7 G/DL (ref 31–37)
MCV RBC AUTO: 78.8 FL (ref 78–100)
MONOCYTES # BLD: 1.2 K/UL (ref 0.05–1.2)
MONOCYTES NFR BLD: 8 % (ref 3–10)
NEUTS SEG # BLD: 12.6 K/UL (ref 1.8–8)
NEUTS SEG NFR BLD: 81 % (ref 40–73)
NRBC # BLD: 0 K/UL (ref 0–0.01)
NRBC BLD-RTO: 0 PER 100 WBC
PLATELET # BLD AUTO: 402 K/UL (ref 135–420)
PMV BLD AUTO: 10.3 FL (ref 9.2–11.8)
POTASSIUM SERPL-SCNC: 4.3 MMOL/L (ref 3.5–5.5)
RBC # BLD AUTO: 3.53 M/UL (ref 4.35–5.65)
SERVICE CMNT-IMP: ABNORMAL
SODIUM SERPL-SCNC: 135 MMOL/L (ref 136–145)
SPECIMEN EXP DATE BLD: NORMAL
WBC # BLD AUTO: 15.6 K/UL (ref 4.6–13.2)

## 2022-06-24 PROCEDURE — 86900 BLOOD TYPING SEROLOGIC ABO: CPT

## 2022-06-24 PROCEDURE — 76060000036 HC ANESTHESIA 2.5 TO 3 HR: Performed by: SURGERY

## 2022-06-24 PROCEDURE — 77030002996 HC SUT SLK J&J -A: Performed by: SURGERY

## 2022-06-24 PROCEDURE — 74011000258 HC RX REV CODE- 258: Performed by: NURSE ANESTHETIST, CERTIFIED REGISTERED

## 2022-06-24 PROCEDURE — 2709999900 HC NON-CHARGEABLE SUPPLY: Performed by: SURGERY

## 2022-06-24 PROCEDURE — 77030010512 HC APPL CLP LIG J&J -C: Performed by: SURGERY

## 2022-06-24 PROCEDURE — 77030020782 HC GWN BAIR PAWS FLX 3M -B: Performed by: SURGERY

## 2022-06-24 PROCEDURE — 74011250636 HC RX REV CODE- 250/636: Performed by: HOSPITALIST

## 2022-06-24 PROCEDURE — 87040 BLOOD CULTURE FOR BACTERIA: CPT

## 2022-06-24 PROCEDURE — 85025 COMPLETE CBC W/AUTO DIFF WBC: CPT

## 2022-06-24 PROCEDURE — 36415 COLL VENOUS BLD VENIPUNCTURE: CPT

## 2022-06-24 PROCEDURE — 74011250636 HC RX REV CODE- 250/636: Performed by: SURGERY

## 2022-06-24 PROCEDURE — 74011250636 HC RX REV CODE- 250/636: Performed by: INTERNAL MEDICINE

## 2022-06-24 PROCEDURE — 77030008462 HC STPLR SKN PROX J&J -A: Performed by: SURGERY

## 2022-06-24 PROCEDURE — 74011000250 HC RX REV CODE- 250: Performed by: REGISTERED NURSE

## 2022-06-24 PROCEDURE — 74011250637 HC RX REV CODE- 250/637: Performed by: HOSPITALIST

## 2022-06-24 PROCEDURE — 74011250637 HC RX REV CODE- 250/637: Performed by: STUDENT IN AN ORGANIZED HEALTH CARE EDUCATION/TRAINING PROGRAM

## 2022-06-24 PROCEDURE — 77030010507 HC ADH SKN DERMBND J&J -B: Performed by: SURGERY

## 2022-06-24 PROCEDURE — 74011000258 HC RX REV CODE- 258: Performed by: INTERNAL MEDICINE

## 2022-06-24 PROCEDURE — 77030002933 HC SUT MCRYL J&J -A: Performed by: SURGERY

## 2022-06-24 PROCEDURE — 82962 GLUCOSE BLOOD TEST: CPT

## 2022-06-24 PROCEDURE — 03U807Z SUPPLEMENT LEFT BRACHIAL ARTERY WITH AUTOLOGOUS TISSUE SUBSTITUTE, OPEN APPROACH: ICD-10-PCS | Performed by: SURGERY

## 2022-06-24 PROCEDURE — C1894 INTRO/SHEATH, NON-LASER: HCPCS | Performed by: SURGERY

## 2022-06-24 PROCEDURE — 76210000006 HC OR PH I REC 0.5 TO 1 HR: Performed by: SURGERY

## 2022-06-24 PROCEDURE — 77030013079 HC BLNKT BAIR HGGR 3M -A: Performed by: ANESTHESIOLOGY

## 2022-06-24 PROCEDURE — 87186 SC STD MICRODIL/AGAR DIL: CPT

## 2022-06-24 PROCEDURE — 87205 SMEAR GRAM STAIN: CPT

## 2022-06-24 PROCEDURE — 77030002986 HC SUT PROL J&J -A: Performed by: SURGERY

## 2022-06-24 PROCEDURE — 77030002987 HC SUT PROL J&J -B: Performed by: SURGERY

## 2022-06-24 PROCEDURE — 80048 BASIC METABOLIC PNL TOTAL CA: CPT

## 2022-06-24 PROCEDURE — 05BC0ZZ EXCISION OF LEFT BASILIC VEIN, OPEN APPROACH: ICD-10-PCS | Performed by: SURGERY

## 2022-06-24 PROCEDURE — 77030012508 HC MSK AIRWY LMA AMBU -A: Performed by: ANESTHESIOLOGY

## 2022-06-24 PROCEDURE — 77030019895 HC PCKNG STRP IODO -A: Performed by: SURGERY

## 2022-06-24 PROCEDURE — 65270000046 HC RM TELEMETRY

## 2022-06-24 PROCEDURE — 87077 CULTURE AEROBIC IDENTIFY: CPT

## 2022-06-24 PROCEDURE — 05PY0JZ REMOVAL OF SYNTHETIC SUBSTITUTE FROM UPPER VEIN, OPEN APPROACH: ICD-10-PCS | Performed by: SURGERY

## 2022-06-24 PROCEDURE — 06HY33Z INSERTION OF INFUSION DEVICE INTO LOWER VEIN, PERCUTANEOUS APPROACH: ICD-10-PCS | Performed by: SURGERY

## 2022-06-24 PROCEDURE — 77030037877 HC DRSG MEPILEX >48IN BORD MOLN -A: Performed by: SURGERY

## 2022-06-24 PROCEDURE — 74011000250 HC RX REV CODE- 250: Performed by: NURSE ANESTHETIST, CERTIFIED REGISTERED

## 2022-06-24 PROCEDURE — 74011250636 HC RX REV CODE- 250/636: Performed by: NURSE ANESTHETIST, CERTIFIED REGISTERED

## 2022-06-24 PROCEDURE — 76010000132 HC OR TIME 2.5 TO 3 HR: Performed by: SURGERY

## 2022-06-24 PROCEDURE — 74011250636 HC RX REV CODE- 250/636: Performed by: REGISTERED NURSE

## 2022-06-24 PROCEDURE — 77030040361 HC SLV COMPR DVT MDII -B: Performed by: SURGERY

## 2022-06-24 PROCEDURE — 77030018390 HC SPNG HEMSTAT2 J&J -B: Performed by: SURGERY

## 2022-06-24 PROCEDURE — 03PY0JZ REMOVAL OF SYNTHETIC SUBSTITUTE FROM UPPER ARTERY, OPEN APPROACH: ICD-10-PCS | Performed by: SURGERY

## 2022-06-24 PROCEDURE — 77030031139 HC SUT VCRL2 J&J -A: Performed by: SURGERY

## 2022-06-24 PROCEDURE — 74011636637 HC RX REV CODE- 636/637: Performed by: HOSPITALIST

## 2022-06-24 PROCEDURE — 87075 CULTR BACTERIA EXCEPT BLOOD: CPT

## 2022-06-24 RX ORDER — PROPOFOL 10 MG/ML
INJECTION, EMULSION INTRAVENOUS AS NEEDED
Status: DISCONTINUED | OUTPATIENT
Start: 2022-06-24 | End: 2022-06-24 | Stop reason: HOSPADM

## 2022-06-24 RX ORDER — OXYCODONE AND ACETAMINOPHEN 5; 325 MG/1; MG/1
1-2 TABLET ORAL
Status: DISCONTINUED | OUTPATIENT
Start: 2022-06-24 | End: 2022-07-06 | Stop reason: HOSPADM

## 2022-06-24 RX ORDER — PHENYLEPHRINE HCL IN 0.9% NACL 1 MG/10 ML
SYRINGE (ML) INTRAVENOUS AS NEEDED
Status: DISCONTINUED | OUTPATIENT
Start: 2022-06-24 | End: 2022-06-24 | Stop reason: HOSPADM

## 2022-06-24 RX ORDER — EPHEDRINE SULFATE/0.9% NACL/PF 50 MG/5 ML
SYRINGE (ML) INTRAVENOUS AS NEEDED
Status: DISCONTINUED | OUTPATIENT
Start: 2022-06-24 | End: 2022-06-24 | Stop reason: HOSPADM

## 2022-06-24 RX ORDER — LIDOCAINE HYDROCHLORIDE 20 MG/ML
INJECTION, SOLUTION EPIDURAL; INFILTRATION; INTRACAUDAL; PERINEURAL AS NEEDED
Status: DISCONTINUED | OUTPATIENT
Start: 2022-06-24 | End: 2022-06-24 | Stop reason: HOSPADM

## 2022-06-24 RX ORDER — SODIUM CHLORIDE 9 MG/ML
INJECTION, SOLUTION INTRAVENOUS
Status: DISCONTINUED | OUTPATIENT
Start: 2022-06-24 | End: 2022-06-24 | Stop reason: HOSPADM

## 2022-06-24 RX ORDER — SODIUM CHLORIDE, SODIUM LACTATE, POTASSIUM CHLORIDE, CALCIUM CHLORIDE 600; 310; 30; 20 MG/100ML; MG/100ML; MG/100ML; MG/100ML
100 INJECTION, SOLUTION INTRAVENOUS CONTINUOUS
Status: DISCONTINUED | OUTPATIENT
Start: 2022-06-24 | End: 2022-06-24 | Stop reason: HOSPADM

## 2022-06-24 RX ORDER — NALOXONE HYDROCHLORIDE 0.4 MG/ML
0.4 INJECTION, SOLUTION INTRAMUSCULAR; INTRAVENOUS; SUBCUTANEOUS AS NEEDED
Status: DISCONTINUED | OUTPATIENT
Start: 2022-06-24 | End: 2022-06-24 | Stop reason: HOSPADM

## 2022-06-24 RX ORDER — FENTANYL CITRATE 50 UG/ML
50 INJECTION, SOLUTION INTRAMUSCULAR; INTRAVENOUS
Status: DISCONTINUED | OUTPATIENT
Start: 2022-06-24 | End: 2022-06-24 | Stop reason: HOSPADM

## 2022-06-24 RX ORDER — ONDANSETRON 2 MG/ML
INJECTION INTRAMUSCULAR; INTRAVENOUS AS NEEDED
Status: DISCONTINUED | OUTPATIENT
Start: 2022-06-24 | End: 2022-06-24 | Stop reason: HOSPADM

## 2022-06-24 RX ORDER — FENTANYL CITRATE 50 UG/ML
INJECTION, SOLUTION INTRAMUSCULAR; INTRAVENOUS AS NEEDED
Status: DISCONTINUED | OUTPATIENT
Start: 2022-06-24 | End: 2022-06-24 | Stop reason: HOSPADM

## 2022-06-24 RX ORDER — HYDROMORPHONE HYDROCHLORIDE 1 MG/ML
0.5 INJECTION, SOLUTION INTRAMUSCULAR; INTRAVENOUS; SUBCUTANEOUS
Status: DISCONTINUED | OUTPATIENT
Start: 2022-06-24 | End: 2022-07-06 | Stop reason: HOSPADM

## 2022-06-24 RX ORDER — GLYCOPYRROLATE 0.2 MG/ML
INJECTION INTRAMUSCULAR; INTRAVENOUS AS NEEDED
Status: DISCONTINUED | OUTPATIENT
Start: 2022-06-24 | End: 2022-06-24 | Stop reason: HOSPADM

## 2022-06-24 RX ORDER — SODIUM CHLORIDE 9 MG/ML
75 INJECTION, SOLUTION INTRAVENOUS CONTINUOUS
Status: DISCONTINUED | OUTPATIENT
Start: 2022-06-24 | End: 2022-06-25

## 2022-06-24 RX ORDER — FLUMAZENIL 0.1 MG/ML
0.2 INJECTION INTRAVENOUS
Status: DISCONTINUED | OUTPATIENT
Start: 2022-06-24 | End: 2022-06-24 | Stop reason: HOSPADM

## 2022-06-24 RX ORDER — ONDANSETRON 2 MG/ML
4 INJECTION INTRAMUSCULAR; INTRAVENOUS ONCE
Status: DISCONTINUED | OUTPATIENT
Start: 2022-06-24 | End: 2022-06-24 | Stop reason: HOSPADM

## 2022-06-24 RX ORDER — HEPARIN SODIUM 1000 [USP'U]/ML
INJECTION, SOLUTION INTRAVENOUS; SUBCUTANEOUS AS NEEDED
Status: DISCONTINUED | OUTPATIENT
Start: 2022-06-24 | End: 2022-06-24 | Stop reason: HOSPADM

## 2022-06-24 RX ADMIN — Medication 100 MCG: at 18:41

## 2022-06-24 RX ADMIN — LIDOCAINE HYDROCHLORIDE 60 MG: 20 INJECTION, SOLUTION INTRAVENOUS at 18:01

## 2022-06-24 RX ADMIN — CEFAZOLIN 1 G: 1 INJECTION, POWDER, FOR SOLUTION INTRAMUSCULAR; INTRAVENOUS at 18:11

## 2022-06-24 RX ADMIN — Medication 10 MG: at 19:28

## 2022-06-24 RX ADMIN — FENTANYL CITRATE 50 MCG: 50 INJECTION, SOLUTION INTRAMUSCULAR; INTRAVENOUS at 18:35

## 2022-06-24 RX ADMIN — ISOSORBIDE MONONITRATE 60 MG: 60 TABLET, EXTENDED RELEASE ORAL at 08:04

## 2022-06-24 RX ADMIN — HEPARIN SODIUM 5000 UNITS: 5000 INJECTION INTRAVENOUS; SUBCUTANEOUS at 02:00

## 2022-06-24 RX ADMIN — Medication 100 MCG: at 18:58

## 2022-06-24 RX ADMIN — DOXAZOSIN 2 MG: 1 TABLET ORAL at 08:05

## 2022-06-24 RX ADMIN — FUROSEMIDE 80 MG: 40 TABLET ORAL at 08:04

## 2022-06-24 RX ADMIN — SODIUM BICARBONATE 650 MG: 650 TABLET ORAL at 21:52

## 2022-06-24 RX ADMIN — CYANOCOBALAMIN TAB 1000 MCG 1000 MCG: 1000 TAB at 08:04

## 2022-06-24 RX ADMIN — Medication 100 MCG: at 19:11

## 2022-06-24 RX ADMIN — Medication 7 UNITS: at 21:52

## 2022-06-24 RX ADMIN — PANTOPRAZOLE SODIUM 40 MG: 40 TABLET, DELAYED RELEASE ORAL at 08:04

## 2022-06-24 RX ADMIN — HEPARIN SODIUM 2000 UNITS: 1000 INJECTION INTRAVENOUS; SUBCUTANEOUS at 19:17

## 2022-06-24 RX ADMIN — SODIUM CHLORIDE: 900 INJECTION, SOLUTION INTRAVENOUS at 18:33

## 2022-06-24 RX ADMIN — CEFAZOLIN 1 G: 1 INJECTION, POWDER, FOR SOLUTION INTRAMUSCULAR; INTRAVENOUS at 17:13

## 2022-06-24 RX ADMIN — Medication 100 MCG: at 20:08

## 2022-06-24 RX ADMIN — SODIUM BICARBONATE 650 MG: 650 TABLET ORAL at 08:04

## 2022-06-24 RX ADMIN — FUROSEMIDE 80 MG: 40 TABLET ORAL at 21:51

## 2022-06-24 RX ADMIN — PROPOFOL 100 MG: 10 INJECTION, EMULSION INTRAVENOUS at 18:01

## 2022-06-24 RX ADMIN — SODIUM CHLORIDE: 900 INJECTION, SOLUTION INTRAVENOUS at 18:32

## 2022-06-24 RX ADMIN — SODIUM CHLORIDE 75 ML/HR: 900 INJECTION, SOLUTION INTRAVENOUS at 22:00

## 2022-06-24 RX ADMIN — Medication 10 MG: at 19:04

## 2022-06-24 RX ADMIN — ASPIRIN 81 MG: 81 TABLET, COATED ORAL at 08:04

## 2022-06-24 RX ADMIN — HYDROMORPHONE HYDROCHLORIDE 0.5 MG: 1 INJECTION, SOLUTION INTRAMUSCULAR; INTRAVENOUS; SUBCUTANEOUS at 21:57

## 2022-06-24 RX ADMIN — Medication 100 MCG: at 19:27

## 2022-06-24 RX ADMIN — GLYCOPYRROLATE 0.2 MG: 0.2 INJECTION INTRAMUSCULAR; INTRAVENOUS at 19:30

## 2022-06-24 RX ADMIN — ONDANSETRON HYDROCHLORIDE 4 MG: 2 INJECTION INTRAMUSCULAR; INTRAVENOUS at 19:13

## 2022-06-24 RX ADMIN — SODIUM CHLORIDE 75 ML/HR: 900 INJECTION, SOLUTION INTRAVENOUS at 17:05

## 2022-06-24 RX ADMIN — CARVEDILOL 12.5 MG: 12.5 TABLET, FILM COATED ORAL at 08:05

## 2022-06-24 RX ADMIN — CALCITRIOL CAPSULES 0.25 MCG 0.25 MCG: 0.25 CAPSULE ORAL at 21:51

## 2022-06-24 RX ADMIN — FENTANYL CITRATE 25 MCG: 50 INJECTION, SOLUTION INTRAMUSCULAR; INTRAVENOUS at 17:52

## 2022-06-24 RX ADMIN — FENTANYL CITRATE 25 MCG: 50 INJECTION, SOLUTION INTRAMUSCULAR; INTRAVENOUS at 19:41

## 2022-06-24 RX ADMIN — Medication 100 MCG: at 18:57

## 2022-06-24 NOTE — PROGRESS NOTES
Hospitalist Progress Note    Patient: Jeannette Dewey MRN: [id number]  CSN: [id number]    YOB: 1970  Age: 50 y.o. Sex: male    DOA: 6/20/2022 LOS:  LOS: 4 days          Assessment/Plan     Patient Active Problem List   Diagnosis Code    Iron deficiency anemia D50.9    Other restrictive cardiomyopathy ([facility] I42.5    ESRD (end stage renal disease) on dialysis ([facility] N18.6, Z99.2    HTN (hypertension) I10    Hx of BKA, right ([facility] Z89.511    Uremia N19    Non-compliance with [facility] dialysis ([facility] Z91.15    Hypertension I10    COVID-19 U07.1    Pulmonary edema J81.1    Volume overload E87.70    Sepsis ([facility] A41.9    Bacteremia R78.81    Arteriovenous graft infection ([facility] T82. 7XXA        Chief complaint :  Fever, cough, congestion  54 y.o. male with end-stage renal disease, diabetes, CHF, hypertension, s/p right BKA presents to ER with concerns of not feeling well, fever congestion cough and shortness of breath. Sepsis -  POA  Secondary to bacteremia  Source TDC vs AVG  TDC removed on 6/22/22   Line holiday yesterday, excision later today    Persistent Bacteremia -  Blood cultures growing MSSA  On Cefazolin   ID following. LUE non functioning AVG  Suspect source of infection  Vascular consulted  Plan for removal of AVG today  TDC removed after dialysis 06/22/2022    Volume overload/pulmonary edema-  Resolved with Fluid removal with HD     ESRD-  Missed dialysis  Nephrology following  HD per nephrology     Hypertension-  BP better controlled  Continue with home medications.     COVID 19-  Not hypoxic, monitor     DM-  Started on sliding scale insulin, ADA diet.     Right BKA     DVT prophylaxis with heparin    Add PT/OT consults        Disposition : TBD    S: Feeling weak and uncomfortable     Review of systems  General: No fevers or chills. Cardiovascular: No chest pain or pressure. No palpitations. Pulmonary: No shortness of breath. Gastrointestinal: No nausea, vomiting. Physical Exam:  General: Awake, cooperative, no acute distress    HEENT: NC, Atraumatic. PERRLA, anicteric sclerae. Lungs: CTA Bilaterally. No Wheezing/Rhonchi/Rales. Heart:  S1 S2,  No murmur, No Rubs, No Gallops  Abdomen: Soft, Non distended, Non tender.  +Bowel sounds,   Extremities: Right BKA, tender LUE AVG site  Psych:   Not anxious or agitated. Neurologic:  No acute neurological deficit. Vital signs/Intake and Output:  Visit Vitals  BP (!) 140/70 (BP 1 Location: Right lower arm, BP Patient Position: At rest)   Pulse 72   Temp 97.6 °F (36.4 °C)   Resp 16   Ht 5' 7\" (1.702 m)   Wt 70.9 kg (156 lb 3.2 oz)   SpO2 96%   BMI 24.46 kg/m²     Current Shift:  No intake/output data recorded. Last three shifts:  06/22 1901 - 06/24 0700  In: 240 [P.O.:240]  Out: -             Labs: Results:       Chemistry Recent Labs     06/24/22  0100 06/23/22  0000 06/22/22 0227   * 155* 158*   * 138 136   K 4.3 4.1 4.2   CL 99* 102 102   CO2 28 27 25   BUN 48* 36* 48*   CREA 9.33* 7.75* 11.90*   CA 8.5 8.3* 8.9   AGAP 8 9 9   BUCR 5* 5* 4*      CBC w/Diff Recent Labs     06/24/22  0100 06/23/22  0000 06/22/22 0227   WBC 15.6* 14.0* 15.1*   RBC 3.53* 3.42* 3.20*   HGB 8.8* 8.5* 8.2*   HCT 27.8* 26.2* 24.6*    369 348   GRANS 81* 87*  --    LYMPH 8* 5*  --    EOS 0 0  --       Cardiac Enzymes No results for input(s): CPK, CKND1, WILLIE in the last 72 hours. No lab exists for component: CKRMB, TROIP   Coagulation No results for input(s): PTP, INR, APTT, INREXT, INREXT in the last 72 hours. Lipid Panel Lab Results   Component Value Date/Time    Cholesterol, total 115 06/02/2022 09:45 AM    HDL Cholesterol 62 (H) 06/02/2022 09:45 AM    LDL, calculated 46 06/02/2022 09:45 AM    VLDL, calculated 7 06/02/2022 09:45 AM    Triglyceride 35 06/02/2022 09:45 AM    CHOL/HDL Ratio 1.9 06/02/2022 09:45 AM      BNP No results for input(s): BNPP in the last 72 hours.    Liver Enzymes No results for input(s): TP, ALB, TBIL, AP in the last 72 hours.     No lab exists for component: SGOT, GPT, DBIL   Thyroid Studies Lab Results   Component Value Date/Time    TSH 2.38 08/29/2021 01:20 AM        Procedures/imaging: see electronic medical records for all procedures/Xrays and details which were not copied into this note but were reviewed prior to creation of Plan

## 2022-06-24 NOTE — PROGRESS NOTES
conducted a pre-surgery visit with Jeaneth Castle, who is a 50 y.o.,male. The  provided the following Interventions:  Continued a relationship of care and support. Provided prayer fo patient's healing and recovery. Plan:  Chaplains will continue to follow and will provide pastoral care on an as needed/requested basis.  recommends bedside caregivers page  on duty if patient shows signs of acute spiritual or emotional distress. Rev.  Anand [place]    ,Certified Respecting Choices Advance Care   Coordinator [place]  [phone]

## 2022-06-24 NOTE — PROGRESS NOTES
51 yo male with esrd. Pt with infected tunneled hemodialysis catheter, removed 2 days ago. Pt with infected left arm arterial venous graft. Plan for excision of infected segment of av graft with repair of brachial artery and placement of temporary hemodialysis catheter. Lungs: cta, card:  Rrr. Ext. Left arm with purulent drainage. Labs reviewed. Patient agrees to proceed.

## 2022-06-24 NOTE — PROGRESS NOTES
[facility]  (A Division of [address] [place] [address])                                                                                                                      Medardo Castaneda MD  Office #: - Option # [phone]  Fax #: [phone]     Date of Admission: 6/20/2022Date of Note: 6/24/2022  Reason for Referral: Evaluation and antibiotic management of bacteremia/COVID 10 Infection    I am on call over weekend. Please call via  or Perfect Serve. Thanks. Current Antimicrobials:    Prior Antimicrobials:  daptomycin  6/20 to date     Cefepime 6/20 to 6/21       Assessment- ID related:  --------------------------------------------------------------------------    · MSSA high grade and persistent bacteremia-- 6/20 and 6/21-- suspect TDC/AVG source. L arm AVG is opened up with drainage  --[facility] [place], operated by [facility] removed--6/22- cath tip cx in progress  --AVG drain culture 6/22- S.aurues  · Possible IE on MV- on TTE 6/22  · COVID 19 Infection-- doubt viral PNA  --fully vaccinared and boosted 11/2021  --not clear when viral infection was acquired  · Pul edema Vs PNA-- had few missed HD sessions  · Occluded L AVG  · History of C.diff in the past    Other Medical Issues- Mx per respective team:  ESRD on HD  DM  R BKA  Hx of AMS with severe Hypoglycemia== recent admission      Recommendation for ID issues I am following:  ------------------------------------------------------------------------------  JOANNE Martines, RN and Pharmacy    FU AVG graft site drain- 6/22- heavy MSSA  FU cath tip--6/22: S.aurues    FU bcx 6/24- in progress  C.diff test-- Negative by PCR    Cefazolin 1 gm IV qpm    TTE with possible IE--no BRENDEN indicated at this time    AVG excision in plans today  Can place temp HD cath             Subjective:  Pt seen, on RA.  Feels ok but very hungry  Denies abd pian, less BM frequency  OR plans for 4pm or later    Review of systems:  No F/C/R  No N/V or abd pain or distension  No cough/sob/chest pain  No joint swelling/pain  No itching or rash       HPI:  Carlo Anthony is a 50 y.o. BLACK/ with PMH of ESRD on HD, DM. Recently admitted to [facility] with AMS and hypoglycemia. During his stay, he had underwent vascular study for occlusion on left arm, and was not able to use it, and R TDC was placed. He had missed couple of his HD sessions and he was feeling body aches and SOB and had cough with clear mucus. He came in to ED, felt he could have PNA. Other associated symptoms include N/V and loose BM without   Had high BP to 208/101, fever to 102.1 after admission. .  Currently on Vanco and Cefepime. Reports occasional pain on L arm. No abd pain. Admission CXR with cardiomegaly w/mild pul edema. WBC at 12K, no bands           Active Hospital Problems    Diagnosis Date Noted    Sepsis ([facility] 06/22/2022    Bacteremia 06/22/2022    Arteriovenous graft infection ([facility] 06/22/2022    COVID-19 06/20/2022    Pulmonary edema 06/20/2022    Volume overload 06/20/2022    ESRD (end stage renal disease) on dialysis ([facility] 01/29/2020     Past Medical History:   Diagnosis Date    Chronic kidney disease     Diabetes ([facility]     Heart failure ([facility]     Hemodialysis patient ([facility]     Hypertension     Sickle cell trait ([facility]      Past Surgical History:   Procedure Laterality Date    HX ORTHOPAEDIC      right BKA 2017    IR DECLOT AV GRAFT PERCUTANEOUS  6/3/2022    IR INSERT NON TUNL CVC OVER 5 YRS  6/1/2022     History reviewed. No pertinent family history.   Social History     Socioeconomic History    Marital status: LEGALLY      Spouse name: Not on file    Number of children: Not on file    Years of education: Not on file    Highest education level: Not on file   Occupational History    Not on file   Tobacco Use    Smoking status: Former Smoker    Smokeless tobacco: Never Used   Substance and Sexual Activity    Alcohol use: No    Drug use: No    Sexual activity: Not on file   Other Topics Concern    Not on file   Social History Narrative    Not on file     Social Determinants of Health     Financial Resource Strain:     Difficulty of Paying Living Expenses: Not on file   Food Insecurity:     Worried About [address] in the Last Year: Not on file    Danii of Food in the Last Year: Not on file   Transportation Needs:     Lack of Transportation (Medical): Not on file    Lack of Transportation (Non-Medical):  Not on file   Physical Activity:     Days of Exercise per Week: Not on file    Minutes of Exercise per Session: Not on file   Stress:     Feeling of Stress : Not on file   Social Connections:     Frequency of Communication with Friends and Family: Not on file    Frequency of Social Gatherings with Friends and Family: Not on file    Attends Mormonism Services: Not on file    Active Member of Clubs or Organizations: Not on file    Attends Club or Organization Meetings: Not on file    Marital Status: Not on file   Intimate Partner Violence:     Fear of Current or Ex-Partner: Not on file    Emotionally Abused: Not on file    Physically Abused: Not on file    Sexually Abused: Not on file   Housing Stability:     Unable to Pay for Housing in the Last Year: Not on file    Number of Places Lived in the Last Year: Not on file    Unstable Housing in the Last Year: Not on file       Allergies:  Penicillins and Vancomycin     Medications:  Current Facility-Administered Medications   Medication Dose Route Frequency    ceFAZolin (ANCEF) 1 g in 0.9% sodium chloride (MBP/ADV) 50 mL MBP  1 g IntraVENous Q24H    insulin glargine (LANTUS) injection 7 Units  7 Units SubCUTAneous QHS    epoetin delonte-epbx (RETACRIT) injection 20,000 Units  20,000 Units SubCUTAneous Q MON, WED & FRI    sevelamer carbonate (RENVELA) oral powder 1,200 mg  1,200 mg Oral TID WITH MEALS    ondansetron (ZOFRAN) injection 4 mg  4 mg IntraVENous Q8H PRN    acetaminophen (TYLENOL) tablet 650 mg  650 mg Oral Q6H PRN    Or    acetaminophen (TYLENOL) suppository 650 mg  650 mg Rectal Q6H PRN    guaiFENesin-dextromethorphan (ROBITUSSIN DM) 100-10 mg/5 mL syrup 5 mL  5 mL Oral Q4H PRN    sodium chloride (NS) flush 5-10 mL  5-10 mL IntraVENous PRN    carvediloL (COREG) tablet 12.5 mg  12.5 mg Oral BID WITH MEALS    calcitRIOL (ROCALTROL) capsule 0.25 mcg  0.25 mcg Oral Q MON, WED & FRI    aspirin delayed-release tablet 81 mg  81 mg Oral DAILY    cyanocobalamin tablet 1,000 mcg  1,000 mcg Oral DAILY    doxazosin (CARDURA) tablet 2 mg  2 mg Oral DAILY    furosemide (LASIX) tablet 80 mg  80 mg Oral BID    isosorbide mononitrate ER (IMDUR) tablet 60 mg  60 mg Oral DAILY    pantoprazole (PROTONIX) tablet 40 mg  40 mg Oral ACB    sodium bicarbonate tablet 650 mg  650 mg Oral TID    glucose chewable tablet 16 g  16 g Oral PRN    glucagon (GLUCAGEN) injection 1 mg  1 mg IntraMUSCular PRN    insulin lispro (HUMALOG) injection   SubCUTAneous AC&HS    dextrose 10% infusion 0-250 mL  0-250 mL IntraVENous PRN    hydrALAZINE (APRESOLINE) 20 mg/mL injection 20 mg  20 mg IntraVENous Q6H PRN    heparin (porcine) injection 5,000 Units  5,000 Units SubCUTAneous Q8H    heparin (porcine) 1,000 unit/mL injection 3,600 Units  3,600 Units Hemodialysis DIALYSIS PRN        ROS:  Pertinent items are noted in the History of Present Illness. Physical Exam:    Temp (24hrs), Av.7 °F (36.5 °C), Min:97.3 °F (36.3 °C), Max:98.1 °F (36.7 °C)    Visit Vitals  BP (!) 115/55 (BP 1 Location: Right lower arm)   Pulse 74   Temp 98.1 °F (36.7 °C)   Resp 16   Ht 5' 7\" (1.702 m)   Wt 70.9 kg (156 lb 3.2 oz)   SpO2 100%   BMI 24.46 kg/m²     R chest  TDC in place--removed        GEN: WD Chronically ill looking - on RA during exam, sats 95/96 %,  not in resp distress. HEENT: Unicteric. EOMI intact  No neck swelling  CHEST: Non laboured breathing. CTA  CVS:RRR, no mur/gallop  ABD: Obese/soft. Non tender. Non distended  YOVANY: Deferred  EXT: No apparent swelling or redness on UE/LE joints except some drainage on lower border of AVG graft that opened up  R BKA  Skin: Dry and intact. No rash, no redness. CNS: A, OX3. Moves all extremity. CN grossly ok. Microbiology  All Micro Results     Procedure Component Value Units Date/Time    CULTURE, Jean Pierre Nehemias STAIN [[id number]]  (Abnormal) Collected: 06/22/22 1505    Order Status: Completed Specimen: Catheter Tip Updated: 06/24/22 0906     Special Requests: NO SPECIAL REQUESTS        Culture result:       COLONY COUNT =>100 COLONIES PROBABLE STAPHYLOCOCCUS AUREUS          CULTURE, WOUND Josh Opal STAIN [[id number]]  (Abnormal)  (Susceptibility) Collected: 06/22/22 0915    Order Status: Completed Specimen: Wound from Abscess Updated: 06/24/22 0856     Special Requests: NO SPECIAL REQUESTS        GRAM STAIN OCCASIONAL WBCS SEEN               RARE GRAM POSITIVE COCCI IN PAIRS           Culture result:       HEAVY STAPHYLOCOCCUS AUREUS          CULTURE, BLOOD [[id number]] Collected: 06/24/22 0100    Order Status: Completed Specimen: Blood Updated: 06/24/22 0720     Special Requests: NO SPECIAL REQUESTS        Culture result: NO GROWTH AFTER 5 HOURS       C. DIFFICILE AG & TOXIN A/B [[id number]]  (Abnormal) Collected: 06/22/22 1945    Order Status: Completed Specimen: Stool from Miscellaneous sample Updated: 06/23/22 1410     PCR Reflex       See Reflex order for C. difficile (DNA)           INTERPRETATION       Indeterminate for Toxigenic C. difficile, DNA confirmation to follow. Harika Li DIFF MOLECULAR [[id number]] Collected: 06/22/22 1945    Order Status: Completed Specimen: Miscellaneous sample Updated: 06/23/22 1410     C Diff Molecular Negative        Comment: This specimen is negative for toxigenic C difficile by DNA amplification. Repeat testing is not recommended for confirmation, samples received within 7 days of this negative result will be rejected.        CULTURE, BLOOD [[id number]]  (Abnormal) Collected: 06/21/22 0244    Order Status: Completed Specimen: Blood Updated: 06/23/22 1336     Special Requests: NO SPECIAL REQUESTS        GRAM STAIN       GRAM POSITIVE COCCI IN CLUSTERS ANAEROBIC BOTTLE                  SMEAR CALLED TO AND CORRECTLY REPEATED BY: John Corbin RN 3N, TO [facility] AT 1847 6/21/2022           Culture result:       STAPHYLOCOCCUS AUREUS GROWING IN BOTH BOTTLES DRAWN No Site Indicated REFER TO [id number] FOR SENSITIVITIES          CULTURE, BLOOD [[id number]]  (Abnormal) Collected: 06/21/22 0253    Order Status: Completed Specimen: Blood Updated: 06/23/22 1335     Special Requests: NO SPECIAL REQUESTS        GRAM STAIN       GRAM POSITIVE COCCI IN CLUSTERS ANAEROBIC BOTTLE                  SMEAR CALLED TO AND CORRECTLY REPEATED BY: VICTORIANO PEDERSEN RN 3N, TO [facility] AT 1847 6/21/2022                  GRAM POSITIVE COCCI IN CLUSTERS AEROBIC BOTTLE                  SMEAR CALLED TO AND CORRECTLY REPEATED BY: GUERO RODRIGUEZ RN 3N AT 4810 ON 6/22/22 TO [facility]. Culture result:       STAPHYLOCOCCUS AUREUS GROWING IN BOTH BOTTLES DRAWN No Site Indicated REFER TO [id number] FOR SENSITIVITIES          CULTURE, BLOOD [[id number]]  (Abnormal) Collected: 06/20/22 0915    Order Status: Completed Specimen: Blood Updated: 06/23/22 0944     Special Requests: NO SPECIAL REQUESTS        GRAM STAIN       ANAEROBIC BOTTLE GRAM POSITIVE COCCI IN CLUSTERS                  SMEAR CALLED TO AND CORRECTLY REPEATED BY: DAVIS Ko RN 3N ON 6/21/22 AT 0531 TO [facility]. GRAM POSITIVE COCCI IN CLUSTERS AEROBIC BOTTLE                  SMEAR CALLED TO AND CORRECTLY REPEATED BY: ELISA TOLLIVER RN 3N TO 2001 \[facility] [place] [facility]\"" AT 9608 ON 6/21/22.            Culture result:       STAPHYLOCOCCUS AUREUS GROWING IN BOTH BOTTLES DRAWN SITE= RT ARM REFER TO [id number] FOR SENSITIVITIES          CULTURE, BLOOD [[id number]]  (Abnormal)  (Susceptibility) Collected: 06/20/22 0940    Order Status: Completed Specimen: Blood Updated: 06/23/22 0943     Special Requests: NO SPECIAL REQUESTS        GRAM STAIN       ANAEROBIC BOTTLE GRAM POSITIVE COCCI IN CLUSTERS                  SMEAR CALLED TO AND CORRECTLY REPEATED BY: DAVIS Ko RN 3N ON 6/21/22 AT 0531 TO [facility].                   GRAM POSITIVE COCCI IN CLUSTERS AEROBIC BOTTLE                  SMEAR CALLED TO AND CORRECTLY REPEATED BY: JOLENE Sage And Tommy Morrow RN 3N, TO [facility] AT 1441 6/21/2022           Culture result:       STAPHYLOCOCCUS AUREUS GROWING IN BOTH BOTTLES DRAWN SITE = [facility]          CULTURE, CATHETER TIP [[id number]] Collected: 06/22/22 1459    Order Status: Canceled Specimen: CVP Catheter Tip     BLOOD CULTURE ID PANEL [[id number]]  (Abnormal) Collected: 06/20/22 0940    Order Status: Completed Specimen: Blood Updated: 06/22/22 0031     Acc. no. from Micro Order [id number]     Enterococcus faecalis Not detected        Enterococcus faecium Not detected        Listeria monocytogenes Not detected        Staphylococcus Detected        Staphylococcus aureus Detected        Staph epidermidis Not detected        Staph lugdunensis Not detected        Streptococcus Not detected        Streptococcus agalactiae (Group B) Not detected        Streptococcus pneumoniae Not detected        Streptococcus pyogenes (Group A) Not detected        Acinetobacter calcoaceticus-baumanii complex Not detected        Bacteroides fragilis Not detected        Enterobacterales species Not detected        Enterobacter cloacae complex Not detected        Escherichia coli Not detected        Klebsiella aerogenes Not detected        Klebsiella oxytoca Not detected        Klebsiella pneumoniae group Not detected        Proteus Not detected        Salmonella Not detected        Serratia marcescens Not detected        Haemophilus influenzae Not detected        Neisseria meningitidis Not detected        Pseudomonas aeruginosa Not detected        Steno maltophilia Not detected        Candida albicans Not detected Candida auris Not detected        Candida glabrata Not detected        Candida krusei Not detected        Candida parapsilosis Not detected        Candida tropicalis Not detected        Crypto neoformans/gattii Not detected        RESISTANT GENES:            mecA/C and MREJ (Methicillin resistant gene) Not detected        Comment       False positive results may rarely occur. Correlate with clinical,epidemiologic, and other laboratory findings           Comment: Please see BCID Interpretation Guide in EPIC Links       LEGIONELLA PNEUMOPHILA AG, URINE [[id number]] Collected: 06/21/22 0630    Order Status: Canceled Specimen: Urine     Alban Fulton, URINE [[id number]] Collected: 06/21/22 0630    Order Status: Canceled Specimen: Urine     CULTURE, RESPIRATORY/SPUTUM/BRONCH Ruth Ann Pellant [[id number]] Collected: 06/21/22 0630    Order Status: Canceled Specimen: Sputum     COVID-19 RAPID TEST [[id number]]  (Abnormal) Collected: 06/20/22 0925    Order Status: Completed Specimen: Nasopharyngeal Updated: 06/20/22 1013     Specimen source Nasopharyngeal        COVID-19 rapid test Detected        Comment:      The specimen is POSITIVE for SARS-CoV-2, the novel coronavirus associated with COVID-19. This test has been authorized by the FDA under an Emergency Use Authorization (EUA) for use by authorized laboratories. Fact sheet for Healthcare Providers: iTendency.uy  Fact sheet for Patients: iTendency.uy       Methodology: Isothermal Nucleic Acid Amplification  CALLED TO AND CORRECTLY REPEATED BY:  DR JAYLAN MITCHELL 6/20/22 AT 1013 TO [facility]         INFLUENZA A & B AG (RAPID TEST) [[id number]] Collected: 06/20/22 0925    Order Status: Completed Specimen: Nasopharyngeal from Nasal washing Updated: 06/20/22 1012     Influenza A Antigen Negative        Comment: A negative result does not exclude influenza virus infection, seasonal or H1N1 due to suboptimal sensitivity.  If influenza is circulating in your community, a diagnosis of influenza should be considered based on a patients clinical presentation and empiric antiviral treatment should be considered, if indicated. Influenza B Antigen Negative              Lab results:    Chemistry  Recent Labs     06/24/22  0100 06/23/22  0000 06/22/22 0227   * 155* 158*   * 138 136   K 4.3 4.1 4.2   CL 99* 102 102   CO2 28 27 25   BUN 48* 36* 48*   CREA 9.33* 7.75* 11.90*   CA 8.5 8.3* 8.9   AGAP 8 9 9   BUCR 5* 5* 4*       CBC w/ Diff  Recent Labs     06/24/22  0100 06/23/22  0000 06/22/22 0227   WBC 15.6* 14.0* 15.1*   RBC 3.53* 3.42* 3.20*   HGB 8.8* 8.5* 8.2*   HCT 27.8* 26.2* 24.6*    369 348   GRANS 81* 87*  --    LYMPH 8* 5*  --    EOS 0 0  --        Imaging: report reviewed and as posted by radiologist   Results from Hospital Encounter encounter on 06/01/22    CT HEAD WO CONT    Narrative  EXAM: CT head    INDICATION: Confusion. COMPARISON: 8/29/2021    TECHNIQUE: Axial CT imaging of the head was performed without intravenous  contrast. Standard multiplanar coronal and sagittal reformatted images were  obtained and are included in interpretation. One or more dose reduction techniques were used on this CT: automated exposure  control, adjustment of the mAs and/or kVp according to patient size, and  iterative reconstruction techniques. The specific techniques used on this CT  exam have been documented in the patient's electronic medical record. Digital  Imaging and Communications in Medicine (DICOM) format image data are available  to nonaffiliated external healthcare facilities or entities on a secure, media  free, reciprocally searchable basis with patient authorization for at least a  12-month period after this study. _______________    FINDINGS:    BRAIN AND POSTERIOR FOSSA: Right central arden hypodensity, new from prior study.   No evidence of acute large vessel transcortical infarct or acute parenchymal  hemorrhage. No midline shift or hydrocephalus. EXTRA-AXIAL SPACES AND MENINGES: There are no abnormal extra-axial fluid  collections. CALVARIUM: Intact. SINUSES: Clear. OTHER: None.    _______________    Impression  Right central arden hypodensity, new from prior study, age indeterminate infarct. Correlate clinically.

## 2022-06-24 NOTE — ANESTHESIA PREPROCEDURE EVALUATION
Relevant Problems   CARDIOVASCULAR   (+) HTN (hypertension)   (+) Hypertension      RENAL FAILURE   (+) ESRD (end stage renal disease) on dialysis (HCC)      HEMATOLOGY   (+) Iron deficiency anemia       Anesthetic History   No history of anesthetic complications            Review of Systems / Medical History  Patient summary reviewed, nursing notes reviewed and pertinent labs reviewed    Pulmonary      Recent URI        Pertinent negatives: No COPD, asthma and sleep apnea  Comments: COVID positive; former smoker   Neuro/Psych   Within defined limits           Cardiovascular    Hypertension      CHF      Pertinent negatives: No dysrhythmias  Exercise tolerance: <4 METS  Comments: Restrictive cadiomyopathy   GI/Hepatic/Renal         Renal disease: ESRD and dialysis    Pertinent negatives: No GERD, hepatitis and liver disease   Endo/Other    Diabetes      Pertinent negatives: No hypothyroidism, hyperthyroidism, obesity and blood dyscrasia   Other Findings              Physical Exam    Airway  Mallampati: III  TM Distance: 4 - 6 cm  Neck ROM: normal range of motion   Mouth opening: Normal     Cardiovascular  Regular rate and rhythm,  S1 and S2 normal,  no murmur, click, rub, or gallop             Dental    Dentition: Poor dentition  Comments: Missing many upper/lower teeth; remaining teeth in very poor condition with many broken/chipped teeth   Pulmonary  Breath sounds clear to auscultation               Abdominal  GI exam deferred       Other Findings            Anesthetic Plan    ASA: 4  Anesthesia type: general          Induction: Intravenous  Anesthetic plan and risks discussed with: Patient

## 2022-06-24 NOTE — PROGRESS NOTES
RENAL CONSULT PROGRESS NOTE   2022    Patient:  Tawny Mejia  :  1970  Gender:  male  MRN #:  [id number]    Reason for Consult: ESRD related care, high BP       Subjective:  He says he is okay ,  no fever and nausea  BP stable , breathing stable. Denied palpitation, chest pain   Pain  in left UE over graft     Objective:    Visit Vitals  BP (!) 115/55 (BP 1 Location: Right lower arm)   Pulse 74   Temp 98.1 °F (36.7 °C)   Resp 16   Ht 5' 7\" (1.702 m)   Wt 70.9 kg (156 lb 3.2 oz)   SpO2 100%   BMI 24.46 kg/m²       Physical Exam:    Pt awake,  alert and comfortable  Lung: no crackles   Ext: no edema in LLE, Rt BKA status   Thrombosed graft in LUE, ulcer in distale end , tende    CNS- Oriented to time , place and person     Laboratory Data:    Lab Results   Component Value Date    BUN 48 (H) 2022    BUN 36 (H) 2022    BUN 48 (H) 2022     (L) 2022     2022     2022    CO2 28 2022    CO2 27 2022    CO2 25 2022     Lab Results   Component Value Date    WBC 15.6 (H) 2022    HGB 8.8 (L) 2022    HCT 27.8 (L) 2022     No components found for: CALCIUM, PHOSPHORUS, MAGNESIUM  Lab Results   Component Value Date    HDL 62 (H) 2022     No results found for: SPECIMENTYP, TURBIDITY, UGLU    Imaging Reveiwed:    CXR:- IMPRESSION     Cardiomegaly with mild pulmonary edema. Assessment:  Tawny Mejia is a 54y.o. year old male  h/o ESRD on HD , non compliant with dialysis /skip multiple dialysis session every month despite knowing the risk presented with c/o  fever, congestion, cough, shortness of breath, nausea, vomiting and  diarrhea. Blood culture  and pus culture from graft growing staph aureus . ESRD  Uncontrolled Hypertension  Anemia of CKD  COVID-19 infection   Bacteremia/Graft infection        Plan:    Patient examined and labs reviewed this morning . He is schedule to have graft excision and placement of temporary dialysis cathter this afternoon   Will plan to do dialysis today later in the afternoon if timing allows and has access, if not will plan tomorrow morning . Labs reviewed and patient examined , he does not have urgent clinical and metabolic indication of dialysis today   Appreciate vascular surgery assistanc  Appreciate ID assistance, antibiotics per ID    Intake and output   Dose all  meds for ESRD   Renal diet, salt and potassium restriction      BMD- has secondary Hyperparathyroidism  Continue  calcitriol 1 mcg MWF  sevelamer for phos binders         Uncontrolled Hypertension: BP is at target range   Resume coreg 12.5 mg BID  On doxazosine, Imdur and lasix        Anemia of CKD- Retacrit during HD       I have reviewed patient's record for pertinent labs, radiology and other test . I have reviewed old records. I have ordered labs, medications and radiology as necessary and indicated per patient's condition . Total time spent is approximately 35 minutes. Greater than 50 % of that time was spent in counseling and or care coordination.            José Manuel Ohara MD, MD  [facility].- Nephrology

## 2022-06-24 NOTE — DIABETES MGMT
Diabetes/ Glycemic Control Plan of Care  Recommendations: If steroids discontinued, consider a reduction/discontinue of basal insulin. Assessment: Patient was started on basal insulin 6/23. BG ranging 104-143 mg/dl over the last 24 hours. Patient NPO for procedure today however. Anticipate BG elevation when diet resumed. Continue to adjust insulin regimen to achieve glucose targets. Recent Glucose Results:   Lab Results   Component Value Date/Time     (H) 06/24/2022 01:00 AM    GLUCPOC 133 (H) 06/24/2022 11:47 AM    GLUCPOC 104 06/24/2022 07:44 AM    GLUCPOC 142 (H) 06/23/2022 09:13 PM           BG within target range (non-ICU: <180; -180):  [x] Yes    [x] No   Current insulin orders: 7 units Lantus, corrective Humalog   Total Daily Dose previous 24 hours = 7 units Lantus     Plan/Goals:   Blood glucose will be within target of 70 - 180 mg/dl within 72 hours  Reinforce dietary and medication compliance at home.          Education:  [] Refer to Diabetes Education Record                       [x] Education not indicated at this time     Dianah Gitelman, RD  Glycemic Control Team  [phone]    Monday-Friday   9 am - 3 pm

## 2022-06-24 NOTE — PERIOP NOTES
Telephone report received from [address] on Mr. Silvia Maguire arriving from [facility]. Consents verified and patient verbalizes understanding of procedure to Surgeon. Site Marked in Pre-Op Holding. Awaiting transfer to OR.

## 2022-06-25 LAB
BACTERIA SPEC CULT: ABNORMAL
BASOPHILS # BLD: 0 K/UL (ref 0–0.1)
BASOPHILS NFR BLD: 0 % (ref 0–2)
DIFFERENTIAL METHOD BLD: ABNORMAL
EOSINOPHIL # BLD: 0.1 K/UL (ref 0–0.4)
EOSINOPHIL NFR BLD: 1 % (ref 0–5)
ERYTHROCYTE [DISTWIDTH] IN BLOOD BY AUTOMATED COUNT: 19.5 % (ref 11.6–14.5)
GLUCOSE BLD STRIP.AUTO-MCNC: 107 MG/DL (ref 70–110)
GLUCOSE BLD STRIP.AUTO-MCNC: 73 MG/DL (ref 70–110)
GLUCOSE BLD STRIP.AUTO-MCNC: 74 MG/DL (ref 70–110)
GLUCOSE BLD STRIP.AUTO-MCNC: 84 MG/DL (ref 70–110)
HCT VFR BLD AUTO: 24.4 % (ref 36–48)
HGB BLD-MCNC: 7.5 G/DL (ref 13–16)
IMM GRANULOCYTES # BLD AUTO: 0.6 K/UL (ref 0–0.04)
IMM GRANULOCYTES NFR BLD AUTO: 6 % (ref 0–0.5)
LYMPHOCYTES # BLD: 1.3 K/UL (ref 0.9–3.6)
LYMPHOCYTES NFR BLD: 12 % (ref 21–52)
MCH RBC QN AUTO: 24.9 PG (ref 24–34)
MCHC RBC AUTO-ENTMCNC: 30.7 G/DL (ref 31–37)
MCV RBC AUTO: 81.1 FL (ref 78–100)
MONOCYTES # BLD: 1.2 K/UL (ref 0.05–1.2)
MONOCYTES NFR BLD: 11 % (ref 3–10)
NEUTS SEG # BLD: 7.6 K/UL (ref 1.8–8)
NEUTS SEG NFR BLD: 70 % (ref 40–73)
NRBC # BLD: 0 K/UL (ref 0–0.01)
NRBC BLD-RTO: 0 PER 100 WBC
PLATELET # BLD AUTO: 319 K/UL (ref 135–420)
PMV BLD AUTO: 8.7 FL (ref 9.2–11.8)
RBC # BLD AUTO: 3.01 M/UL (ref 4.35–5.65)
SERVICE CMNT-IMP: ABNORMAL
WBC # BLD AUTO: 10.8 K/UL (ref 4.6–13.2)

## 2022-06-25 PROCEDURE — 74011250636 HC RX REV CODE- 250/636: Performed by: HOSPITALIST

## 2022-06-25 PROCEDURE — 36415 COLL VENOUS BLD VENIPUNCTURE: CPT

## 2022-06-25 PROCEDURE — 74011250637 HC RX REV CODE- 250/637: Performed by: SURGERY

## 2022-06-25 PROCEDURE — 87040 BLOOD CULTURE FOR BACTERIA: CPT

## 2022-06-25 PROCEDURE — 82962 GLUCOSE BLOOD TEST: CPT

## 2022-06-25 PROCEDURE — 74011250636 HC RX REV CODE- 250/636: Performed by: INTERNAL MEDICINE

## 2022-06-25 PROCEDURE — 74011250637 HC RX REV CODE- 250/637: Performed by: HOSPITALIST

## 2022-06-25 PROCEDURE — 74011250637 HC RX REV CODE- 250/637: Performed by: STUDENT IN AN ORGANIZED HEALTH CARE EDUCATION/TRAINING PROGRAM

## 2022-06-25 PROCEDURE — 65270000046 HC RM TELEMETRY

## 2022-06-25 PROCEDURE — 90935 HEMODIALYSIS ONE EVALUATION: CPT

## 2022-06-25 PROCEDURE — 85025 COMPLETE CBC W/AUTO DIFF WBC: CPT

## 2022-06-25 PROCEDURE — 74011000258 HC RX REV CODE- 258: Performed by: INTERNAL MEDICINE

## 2022-06-25 PROCEDURE — 77010033678 HC OXYGEN DAILY

## 2022-06-25 RX ADMIN — CYANOCOBALAMIN TAB 1000 MCG 1000 MCG: 1000 TAB at 09:46

## 2022-06-25 RX ADMIN — HEPARIN SODIUM 5000 UNITS: 5000 INJECTION INTRAVENOUS; SUBCUTANEOUS at 03:37

## 2022-06-25 RX ADMIN — FUROSEMIDE 80 MG: 40 TABLET ORAL at 20:59

## 2022-06-25 RX ADMIN — SODIUM BICARBONATE 650 MG: 650 TABLET ORAL at 20:59

## 2022-06-25 RX ADMIN — CARVEDILOL 12.5 MG: 12.5 TABLET, FILM COATED ORAL at 09:47

## 2022-06-25 RX ADMIN — SODIUM BICARBONATE 650 MG: 650 TABLET ORAL at 09:48

## 2022-06-25 RX ADMIN — SEVELAMER CARBONATE 1200 MG: 2400 POWDER, FOR SUSPENSION ORAL at 12:00

## 2022-06-25 RX ADMIN — PANTOPRAZOLE SODIUM 40 MG: 40 TABLET, DELAYED RELEASE ORAL at 09:48

## 2022-06-25 RX ADMIN — ISOSORBIDE MONONITRATE 60 MG: 60 TABLET, EXTENDED RELEASE ORAL at 09:48

## 2022-06-25 RX ADMIN — FUROSEMIDE 80 MG: 40 TABLET ORAL at 11:01

## 2022-06-25 RX ADMIN — DOXAZOSIN 2 MG: 1 TABLET ORAL at 09:47

## 2022-06-25 RX ADMIN — CARVEDILOL 12.5 MG: 12.5 TABLET, FILM COATED ORAL at 17:00

## 2022-06-25 RX ADMIN — HEPARIN SODIUM 5000 UNITS: 5000 INJECTION INTRAVENOUS; SUBCUTANEOUS at 11:02

## 2022-06-25 RX ADMIN — OXYCODONE AND ACETAMINOPHEN 2 TABLET: 5; 325 TABLET ORAL at 03:37

## 2022-06-25 RX ADMIN — HEPARIN SODIUM 5000 UNITS: 5000 INJECTION INTRAVENOUS; SUBCUTANEOUS at 18:34

## 2022-06-25 RX ADMIN — ASPIRIN 81 MG: 81 TABLET, COATED ORAL at 09:46

## 2022-06-25 RX ADMIN — SODIUM BICARBONATE 650 MG: 650 TABLET ORAL at 16:00

## 2022-06-25 RX ADMIN — CEFAZOLIN 1 G: 1 INJECTION, POWDER, FOR SOLUTION INTRAMUSCULAR; INTRAVENOUS at 18:00

## 2022-06-25 RX ADMIN — SEVELAMER CARBONATE 1200 MG: 2400 POWDER, FOR SUSPENSION ORAL at 09:49

## 2022-06-25 RX ADMIN — SEVELAMER CARBONATE 1200 MG: 2400 POWDER, FOR SUSPENSION ORAL at 17:00

## 2022-06-25 NOTE — PROGRESS NOTES
Chart reviewed remotely  In OR yesterday-- fem HD catheter placed, OR culture from 6//24( presume from AVG). I don't see Op note on AVG yet    [address] 6/24: NGSF  Cont cefazolin 1 gm Qpm  Repeat bcx again planned form  6/26    Once bacteremia controlled, can plan for per HD cath and ABX post dialysis MWF    I am on call over weekend. Please call via  or Perfect Serve. Thanks. Lorelei Severs Mohammed Carnes, MD  [facility])  Office #:     [phone]-RXERM [phone]   Office Fax: [phone]

## 2022-06-25 NOTE — ANESTHESIA POSTPROCEDURE EVALUATION
Post-Anesthesia Evaluation & Assessment    Visit Vitals  /71   Pulse 82   Temp 36.3 °C (97.3 °F)   Resp 16   Ht 5' 7\" (1.702 m)   Wt 70.9 kg (156 lb 3.2 oz)   SpO2 100%   BMI 24.46 kg/m²       Nausea/Vomiting: Controlled. Post-operative hydration adequate. Pain Scale 1: Numeric (0 - 10) (06/24/22 2057)  Pain Intensity 1: 0 (06/24/22 2057)   Managed    Pain score at or below stated goal level. Mental status & Level of consciousness: alert and oriented x 3    Neurological status: moves all extremities, sensation grossly intact    Pulmonary status: airway patent, adequate oxygenation. Complications related to anesthesia: none    Patient has met all PACU discharge requirements.       Raffi Richard DO

## 2022-06-25 NOTE — PERIOP NOTES
TRANSFER - OUT REPORT:    Verbal report given to Joanne FARFAN(name) on Jeannette Dewey  being transferred to (unit) for routine post - op       Report consisted of patients Situation, Background, Assessment and   Recommendations(SBAR). Information from the following report(s) SBAR, OR Summary, Procedure Summary, Intake/Output, MAR, Recent Results, Med Rec Status and Cardiac Rhythm NSR was reviewed with the receiving nurse. Lines:   PICC Triple Lumen 06/24/22 Right;Other(comment) (Active)   Site Assessment Clean, dry, & intact 06/24/22 2057   Phlebitis Assessment 0 06/24/22 2057   Infiltration Assessment 0 06/24/22 2057   Dressing Status Clean, dry, & intact 06/24/22 2057        Opportunity for questions and clarification was provided.       Patient transported with:   O2 @ 2 liters  Registered Nurse

## 2022-06-25 NOTE — PROGRESS NOTES
RENAL CONSULT PROGRESS NOTE   2022    Patient:  Pattie Nguyễn  :  1970  Gender:  male  MRN #:  [id number]    Reason for Consult: ESRD related care, high BP       Subjective:  Says he is okay,   no fever and nausea  BP stable , breathing stable. Denied palpitation, chest pain   ACE bandage in LUE , no pain     Objective:    Visit Vitals  /69   Pulse 74   Temp 98.5 °F (36.9 °C)   Resp 18   Ht 5' 7\" (1.702 m)   Wt 70.9 kg (156 lb 3.2 oz)   SpO2 99%   BMI 24.46 kg/m²       Physical Exam:    Pt awake,  alert and comfortable  Lung: no crackles   Ext: no edema in LLE, Rt BKA status   LUE with ace bandage ,     CNS- Oriented to time , place and person     Laboratory Data:    Lab Results   Component Value Date    BUN 48 (H) 2022    BUN 36 (H) 2022    BUN 48 (H) 2022     (L) 2022     2022     2022    CO2 28 2022    CO2 27 2022    CO2 25 2022     Lab Results   Component Value Date    WBC 10.8 2022    HGB 7.5 (L) 2022    HCT 24.4 (L) 2022     No components found for: CALCIUM, PHOSPHORUS, MAGNESIUM  Lab Results   Component Value Date    HDL 62 (H) 2022     No results found for: SPECIMENTYP, TURBIDITY, UGLU    Imaging Reveiwed:    CXR:- IMPRESSION     Cardiomegaly with mild pulmonary edema. Assessment:  Pattie Nguyễn is a 54y.o. year old male  h/o ESRD on HD , non compliant with dialysis /skip multiple dialysis session every month despite knowing the risk presented with c/o  fever, congestion, cough, shortness of breath, nausea, vomiting and  diarrhea. Blood culture  and pus culture from graft growing staph aureus . S/o removal of TDC and excision of LUE graft . ESRD  Uncontrolled Hypertension  Anemia of CKD  COVID-19 infection   Bacteremia/Graft infection        Plan:    Patient examined and labs reviewed this morning . He did not have dialysis yesterday due to surgery .  Will plan for dialysis today through temporary catheter   Next dialysis on Monday   Blood cx sent on 6/24 is negative so far, if remained negative could place [facility] on Monday , unless ID has different opinion. Appreciate vascular surgery assistanc  Appreciate ID assistance, antibiotics per ID    Intake and output   Dose all  meds for ESRD   Renal diet, salt and potassium restriction      BMD- has secondary Hyperparathyroidism  Continue  calcitriol 1 mcg MWF  sevelamer for phos binders         Uncontrolled Hypertension: BP is at target range   Resume coreg 12.5 mg BID  On doxazosine, Imdur and lasix        Anemia of CKD- Retacrit during HD       I have reviewed patient's record for pertinent labs, radiology and other test . I have reviewed old records. I have ordered labs, medications and radiology as necessary and indicated per patient's condition . Total time spent is approximately 26 minutes. Greater than 50 % of that time was spent in counseling and or care coordination.            Esthela Mayers MD, MD  [facility], [facility].- Nephrology

## 2022-06-25 NOTE — PROGRESS NOTES
Ok to access right common femoral vein temporary hemodialysis catheter for hemodialysis, I.V. access. Blood draws.

## 2022-06-25 NOTE — ROUTINE PROCESS
Bedside and Verbal shift change report given to [address] (oncoming nurse) by Kelsi Holliday RN (offgoing nurse). Report included the following information SBAR, Kardex, MAR and Recent Results.

## 2022-06-25 NOTE — PROGRESS NOTES
Bedside and Verbal shift change report given to Terra Marcano (oncoming nurse) by Kathia Maradiaga RN (offgoing nurse).  Report included the following information SBAR, Kardex, Intake/Output, MAR, Recent Results and Cardiac Rhythm SR.

## 2022-06-25 NOTE — PROGRESS NOTES
At 1500 Patient Right TDC -groin site. assessed for Dialysis TX. Site was clean and dry. On Aspiration both ports did not have a blood return. Both flushed with moderate resistance. Tx initiation was attempted with no success. Called my colleague also tried , no success. Nephrologist- DR Danielle Mark was notified, and stated to cancel tx till patient get further review by vascular surgeon tommorry possibly.

## 2022-06-25 NOTE — PROGRESS NOTES
Hospitalist Progress Note    Patient: Romulo Amin MRN: [id number]  CSN: [id number]    YOB: 1970  Age: 50 y.o. Sex: male    DOA: 6/20/2022 LOS:  LOS: 5 days          Assessment/Plan     Patient Active Problem List   Diagnosis Code    Iron deficiency anemia D50.9    Other restrictive cardiomyopathy ([facility] I42.5    ESRD (end stage renal disease) on dialysis ([facility] N18.6, Z99.2    HTN (hypertension) I10    Hx of BKA, right ([address] Z89.511    Uremia N19    Non-compliance with renal dialysis ([facility] Z91.15    Hypertension I10    COVID-19 U07.1    Pulmonary edema J81.1    Volume overload E87.70    Sepsis ([facility] [address] [facility] A41.9    Bacteremia R78.81    Arteriovenous graft infection ([facility] [address] [facility] T82. 7XXA      Chief complaint :  Fever, cough, congestion  54 y.o. male with end-stage renal disease, diabetes, CHF, hypertension, s/p right BKA presents to ER with concerns of not feeling well, fever congestion cough and shortness of breath. Sepsis -  POA  Secondary to bacteremia  Source TDC vs AVG  TDC removed on 6/22/22   AVG removed yesterday  Obtain blood cultures today    status post left upper extremity AV graft excision and patch angioplasty of the brachial artery with a basilic vein with temporary catheter placement in the right femoral vein. Persistent Bacteremia -  Blood cultures growing MSSA  On Cefazolin   ID following.     LUE non functioning AVG  AVG removed yesterday, lots of pus found   Has temporary dialysis catheter  Vascular will place permanent dialysis catheter once safe to do so from infection standpoint    Volume overload/pulmonary edema-  Resolved with Fluid removal with HD     ESRD-  Missed dialysis  Nephrology following  HD per nephrology     Hypertension-  BP better controlled  Continue with home medications.     COVID 19-  Not hypoxic, monitor     DM-  Started on sliding scale insulin, ADA diet.     Right BKA     DVT prophylaxis with heparin    Add PT/OT consults        Disposition : TBD    S: Feeling pain in rt arm  Feeling weak and sad    Review of systems  General: No fevers or chills. Cardiovascular: No chest pain or pressure. No palpitations. Pulmonary: No shortness of breath. Gastrointestinal: No nausea, vomiting. Physical Exam:  General: Awake, cooperative, no acute distress    HEENT: NC, Atraumatic. PERRLA, anicteric sclerae. Lungs: CTA Bilaterally. No Wheezing/Rhonchi/Rales. Heart:  S1 S2,  No murmur, No Rubs, No Gallops  Abdomen: Soft, Non distended, Non tender.  +Bowel sounds,   Extremities: Right BKA, tender LUE AVG site  Psych:   Not anxious or agitated. Neurologic:  No acute neurological deficit. Vital signs/Intake and Output:  Visit Vitals  /65   Pulse 71   Temp 98.4 °F (36.9 °C)   Resp 18   Ht 5' 7\" (1.702 m)   Wt 70.9 kg (156 lb 3.2 oz)   SpO2 100%   BMI 24.46 kg/m²     Current Shift:  No intake/output data recorded. Last three shifts:  06/23 1901 - 06/25 0700  In: 350 [I.V.:350]  Out: 50             Labs: Results:       Chemistry Recent Labs     06/24/22  0100 06/23/22  0000   * 155*   * 138   K 4.3 4.1   CL 99* 102   CO2 28 27   BUN 48* 36*   CREA 9.33* 7.75*   CA 8.5 8.3*   AGAP 8 9   BUCR 5* 5*      CBC w/Diff Recent Labs     06/25/22  0644 06/24/22  0100 06/23/22  0000   WBC 10.8 15.6* 14.0*   RBC 3.01* 3.53* 3.42*   HGB 7.5* 8.8* 8.5*   HCT 24.4* 27.8* 26.2*    402 369   GRANS 70 81* 87*   LYMPH 12* 8* 5*   EOS 1 0 0      Cardiac Enzymes No results for input(s): CPK, CKND1, WILLIE in the last 72 hours. No lab exists for component: CKRMB, TROIP   Coagulation No results for input(s): PTP, INR, APTT, INREXT, INREXT in the last 72 hours.     Lipid Panel Lab Results   Component Value Date/Time    Cholesterol, total 115 06/02/2022 09:45 AM    HDL Cholesterol 62 (H) 06/02/2022 09:45 AM    LDL, calculated 46 06/02/2022 09:45 AM    VLDL, calculated 7 06/02/2022 09:45 AM    Triglyceride 35 06/02/2022 09:45 AM    CHOL/HDL Ratio 1.9 06/02/2022 09:45 AM      BNP No results for input(s): BNPP in the last 72 hours. Liver Enzymes No results for input(s): TP, ALB, TBIL, AP in the last 72 hours.     No lab exists for component: SGOT, GPT, DBIL   Thyroid Studies Lab Results   Component Value Date/Time    TSH 2.38 08/29/2021 01:20 AM        Procedures/imaging: see electronic medical records for all procedures/Xrays and details which were not copied into this note but were reviewed prior to creation of Plan

## 2022-06-25 NOTE — PROCEDURES
Temporary hemodialysis catheter Placement Using Ultrasound Guidance    Date :  6/24/2022  Pre / Post Op Dx : esrd. Procedure : placement of right common femoral vein, 20cm, triple lumen, temporary hemodialysis catheter with ultrasound guidance. Surgeon : Enedelia Diaz  Procedure Performed by : Alexandra Ward  Anesthesia : LMA  EBL : Minimal   Complications : None   Description :   After explaining the risks and benefits of the procedure to the patient informed consent was obtained. The patient was properly identified and a time out was performed. The patient was then positioned supine. The area of the right groin was prepped and draped in sterile fashion. A [employer] ultrasound was used to identify the right common femoral  vein. Under direct ultrasound guidance, the right common femoral vein was accessed on the first stick via the Seldinger technique. A soft tipped J wire was inserted through the needle and advanced into the vein without difficulty . The needle was then removed. Using a #11 blade a small \"nick\" was made at the skin / wire interface. A tissue dilator was advanced over the guide wire through the subcutaneous tissue and then removed. The temporary hemodialysis catheter was then advanced into position over the guide wire and the wire was then removed. All three ports aspirated and flushed well. The catheter was then sutured in place and was dressed with a  Tegaderm dressing. The patient tolerated the procedure well. There were no immediate complications noted.

## 2022-06-25 NOTE — ROUTINE PROCESS
Bedside and Verbal shift change report given to Kathia Maradiaga RN (oncoming nurse) by Tami Diop (offgoing nurse). Report included the following information SBAR, Kardex, MAR and Recent Results.

## 2022-06-25 NOTE — PROGRESS NOTES
I saw and examined the patient today status post left upper extremity AV graft excision and patch angioplasty of the brachial artery with a basilic vein. And temporary catheter placement in the right femoral vein. The patient had a palpable radial pulse in the left upper extremity no swelling in the left upper extremity intact motor sensation in the left hand. From the vascular surgery standpoint we will follow-up recommendations regarding insertion of a tunneled dialysis catheter at this point the patient has a temporary dialysis catheter. Once ID team and the medical team census appropriate to perform more permanent dialysis catheter we will plan for either right IJ tunneled dialysis catheter exchange the right groin femoral temporary  to a tunneled dialysis catheter TDC.       Tiki Valdez MD

## 2022-06-25 NOTE — PERIOP NOTES
2 Tere Wu made aware that SBAR is ready for review. Patient assigned room #340.  Joanne FARFAN will be the nurse

## 2022-06-25 NOTE — OP NOTES
Operative Report    Patient: Fox Fisher MRN: [id number]  SSN: [id number]    YOB: 1970  Age: 50 y.o. Sex: male       Date of Surgery: 6/24/2022     Preoperative Diagnosis: DIALYSIS GRAFT INFECTION     Postoperative Diagnosis: DIALYSIS GRAFT INFECTION     Surgeon(s) and Role:     Gino Veloz MD - Primary    Anesthesia: General     Findings: There was purulent discharge encasing the arterial anastomosis area and there was an end graft stent that was also soaking in purulent discharge. The entire graft was excised at the arterial portion including the sutures and the defect in the arterial wall was passed using a vein patch. The more proximal portion of the graft over the venous portion was well incorporated that was oversewn and the tunnel was oversewn with Vicryl suture and then the skin was left open and packed with Betadine dressing. That is the counterincision where the graft was pulled out. Cultures were sent. Procedure: Procedure(s):  EXCISION OF INFECTED AV GRAPH LEFT UPPER ARM,TEMPORARY  DIALYSIS CATHETER PLACEMENT RIGHT LEG, REPAIR BRACIAL ARTERY WITH PATCH, HARVEST OF BASILLIC VEIN. #1 ultrasound-guided access to the right common femoral vein and insertion of temporary dialysis catheter in the right femoral vein. 2.  Left upper extremity AV graft excision and removal of stent with brachial artery patch angioplasty using ipsilateral basilic vein. And harvesting of basilic vein. Procedure in Detail: Informed consent the patient was brought to the OR laid in supine position the right groin was prepped and draped in standard sterile fashion. In the left upper extremity was prepped and draped in standard sterile fashion from fingers all the way up to the axilla.   We started first with the insertion of the right femoral vein  Ultrasound was used to identify the anatomy of the right common femoral vein that was accessed with ultrasound-guided access still we got a venous flush then a wire was inserted the area was dilated and then a temporary dialysis catheter was inserted over the wire and secured in place the ports were flushed without any problems. After securing that and placed in and putting sterile dressing we paid our attention to the left upper extremity AV graft excision. After prepping and draping the left upper extremity in standard sterile fashion an incision was carried over the brachial artery proximally to gain control the vein and the nerves were seen and protected and the brachial artery was looped using a vessel loop. Then dissection was carried distally. and in a similar fashion the distal brachial artery was encircled. After that the patient was given heparin bolus. After 3 minutes the brachial artery was controlled proximally and distally and we identified the insertion of the graft area over the scar tissue that was divided up higher and then followed down all the way into the arteriotomy portion there was a stent in the area that was removed all the portion of the graft at the artery was taken out completely including the stitches and the area was patched with a vein patch that we took from a basilic vein. The basilic vein was opened around 2 cm portion of it and a patch was formed from it then 6-0 Prolene was used to do a patch angioplasty into the arteriotomy then we did the maneuver for retrograde and anterograde flushes there was few bleeding that was controlled using 7-0 Prolene suture and we had a palpable pulse on the radial artery after restoring the flow. Then we irrigated the area very well and we closed the fascia and we closed the tunnel where the graft was. After that we closed the area over the arteriotomy portion and then we did another incision over the area at the did not have fluid collection on the ultrasound that we performed intraoperatively.   That was well incorporated and that was oversewn using a Vicryl suture and then the tunnel where its clean area was oversewn using Vicryl suture then the area with infection and purulent discharge including the area that included the stent was resected from the counterincision and irrigated. A specimen was passed for cultures. That portion of the graft was removed entirely the portion of the venous component of the graft was left alone since it was well incorporated. The patient had a palpable pulse in the radial artery by the end of the case sterile dressing was applied and the arm was wrapped gently with Kerlix and Ace wrap. Counts were correct x2 I was present scrubbed in throughout the entire case. Estimated Blood Loss: Minimal    Tourniquet Time: No tourniquet was used               Specimens:   ID Type Source Tests Collected by Time Destination   1 : Infected Graft Left Arm Wound Graft CULTURE, ANAEROBIC, CULTURE, WOUND W Estiven Beckford MD 6/24/2022 1951 Microbiology           Drains: None                Complications: None    Counts: Sponge and needle counts were correct times two.     Signed By:  Asiya Parkinson MD     June 25, 2022

## 2022-06-26 ENCOUNTER — APPOINTMENT (OUTPATIENT)
Dept: INTERVENTIONAL RADIOLOGY/VASCULAR | Age: 52
DRG: 252 | End: 2022-06-26
Attending: SURGERY
Payer: MEDICARE

## 2022-06-26 LAB
ANION GAP SERPL CALC-SCNC: 9 MMOL/L (ref 3–18)
BUN SERPL-MCNC: 65 MG/DL (ref 7–18)
BUN/CREAT SERPL: 5 (ref 12–20)
CALCIUM SERPL-MCNC: 7.7 MG/DL (ref 8.5–10.1)
CHLORIDE SERPL-SCNC: 101 MMOL/L (ref 100–111)
CO2 SERPL-SCNC: 24 MMOL/L (ref 21–32)
CREAT SERPL-MCNC: 12 MG/DL (ref 0.6–1.3)
GLUCOSE BLD STRIP.AUTO-MCNC: 108 MG/DL (ref 70–110)
GLUCOSE BLD STRIP.AUTO-MCNC: 79 MG/DL (ref 70–110)
GLUCOSE BLD STRIP.AUTO-MCNC: 81 MG/DL (ref 70–110)
GLUCOSE BLD STRIP.AUTO-MCNC: 94 MG/DL (ref 70–110)
GLUCOSE SERPL-MCNC: 81 MG/DL (ref 74–99)
POTASSIUM SERPL-SCNC: 4.3 MMOL/L (ref 3.5–5.5)
SODIUM SERPL-SCNC: 134 MMOL/L (ref 136–145)

## 2022-06-26 PROCEDURE — 74011250636 HC RX REV CODE- 250/636: Performed by: SURGERY

## 2022-06-26 PROCEDURE — 80048 BASIC METABOLIC PNL TOTAL CA: CPT

## 2022-06-26 PROCEDURE — 77010033678 HC OXYGEN DAILY

## 2022-06-26 PROCEDURE — 74011000250 HC RX REV CODE- 250

## 2022-06-26 PROCEDURE — 74011250637 HC RX REV CODE- 250/637: Performed by: STUDENT IN AN ORGANIZED HEALTH CARE EDUCATION/TRAINING PROGRAM

## 2022-06-26 PROCEDURE — C1769 GUIDE WIRE: HCPCS

## 2022-06-26 PROCEDURE — 74011636637 HC RX REV CODE- 636/637: Performed by: HOSPITALIST

## 2022-06-26 PROCEDURE — 74011250636 HC RX REV CODE- 250/636: Performed by: INTERNAL MEDICINE

## 2022-06-26 PROCEDURE — 82962 GLUCOSE BLOOD TEST: CPT

## 2022-06-26 PROCEDURE — B5181ZA FLUOROSCOPY OF SUPERIOR VENA CAVA USING LOW OSMOLAR CONTRAST, GUIDANCE: ICD-10-PCS | Performed by: SURGERY

## 2022-06-26 PROCEDURE — 0JH60XZ INSERTION OF TUNNELED VASCULAR ACCESS DEVICE INTO CHEST SUBCUTANEOUS TISSUE AND FASCIA, OPEN APPROACH: ICD-10-PCS | Performed by: SURGERY

## 2022-06-26 PROCEDURE — 36415 COLL VENOUS BLD VENIPUNCTURE: CPT

## 2022-06-26 PROCEDURE — 74011250636 HC RX REV CODE- 250/636: Performed by: HOSPITALIST

## 2022-06-26 PROCEDURE — 65270000046 HC RM TELEMETRY

## 2022-06-26 PROCEDURE — 74011000258 HC RX REV CODE- 258: Performed by: INTERNAL MEDICINE

## 2022-06-26 PROCEDURE — 02H633Z INSERTION OF INFUSION DEVICE INTO RIGHT ATRIUM, PERCUTANEOUS APPROACH: ICD-10-PCS | Performed by: SURGERY

## 2022-06-26 PROCEDURE — 74011250637 HC RX REV CODE- 250/637: Performed by: HOSPITALIST

## 2022-06-26 PROCEDURE — 87040 BLOOD CULTURE FOR BACTERIA: CPT

## 2022-06-26 RX ORDER — NALOXONE HYDROCHLORIDE 0.4 MG/ML
0.4 INJECTION, SOLUTION INTRAMUSCULAR; INTRAVENOUS; SUBCUTANEOUS AS NEEDED
Status: DISCONTINUED | OUTPATIENT
Start: 2022-06-26 | End: 2022-07-06 | Stop reason: HOSPADM

## 2022-06-26 RX ORDER — LIDOCAINE HYDROCHLORIDE 10 MG/ML
1-10 INJECTION, SOLUTION EPIDURAL; INFILTRATION; INTRACAUDAL; PERINEURAL
Status: COMPLETED | OUTPATIENT
Start: 2022-06-26 | End: 2022-06-26

## 2022-06-26 RX ORDER — FENTANYL CITRATE 50 UG/ML
25-100 INJECTION, SOLUTION INTRAMUSCULAR; INTRAVENOUS
Status: DISCONTINUED | OUTPATIENT
Start: 2022-06-26 | End: 2022-07-02 | Stop reason: HOSPADM

## 2022-06-26 RX ORDER — HEPARIN SODIUM 200 [USP'U]/100ML
500 INJECTION, SOLUTION INTRAVENOUS
Status: COMPLETED | OUTPATIENT
Start: 2022-06-26 | End: 2022-06-26

## 2022-06-26 RX ORDER — HEPARIN SODIUM 1000 [USP'U]/ML
10000 INJECTION, SOLUTION INTRAVENOUS; SUBCUTANEOUS
Status: COMPLETED | OUTPATIENT
Start: 2022-06-26 | End: 2022-06-26

## 2022-06-26 RX ORDER — LIDOCAINE HYDROCHLORIDE 10 MG/ML
INJECTION, SOLUTION EPIDURAL; INFILTRATION; INTRACAUDAL; PERINEURAL
Status: COMPLETED
Start: 2022-06-26 | End: 2022-06-26

## 2022-06-26 RX ORDER — DIPHENHYDRAMINE HYDROCHLORIDE 50 MG/ML
25-50 INJECTION, SOLUTION INTRAMUSCULAR; INTRAVENOUS
Status: DISCONTINUED | OUTPATIENT
Start: 2022-06-26 | End: 2022-07-06 | Stop reason: HOSPADM

## 2022-06-26 RX ADMIN — SEVELAMER CARBONATE 1200 MG: 2400 POWDER, FOR SUSPENSION ORAL at 17:29

## 2022-06-26 RX ADMIN — FENTANYL CITRATE 50 MCG: 50 INJECTION, SOLUTION INTRAMUSCULAR; INTRAVENOUS at 13:56

## 2022-06-26 RX ADMIN — PANTOPRAZOLE SODIUM 40 MG: 40 TABLET, DELAYED RELEASE ORAL at 06:39

## 2022-06-26 RX ADMIN — HEPARIN SODIUM 6000 UNITS: 1000 INJECTION INTRAVENOUS; SUBCUTANEOUS at 14:30

## 2022-06-26 RX ADMIN — HEPARIN SODIUM IN SODIUM CHLORIDE 1000 UNITS: 200 INJECTION INTRAVENOUS at 14:02

## 2022-06-26 RX ADMIN — DIPHENHYDRAMINE HYDROCHLORIDE 50 MG: 50 INJECTION INTRAMUSCULAR; INTRAVENOUS at 13:56

## 2022-06-26 RX ADMIN — CARVEDILOL 12.5 MG: 12.5 TABLET, FILM COATED ORAL at 17:29

## 2022-06-26 RX ADMIN — FENTANYL CITRATE 50 MCG: 50 INJECTION, SOLUTION INTRAMUSCULAR; INTRAVENOUS at 14:06

## 2022-06-26 RX ADMIN — HEPARIN SODIUM 5000 UNITS: 5000 INJECTION INTRAVENOUS; SUBCUTANEOUS at 03:22

## 2022-06-26 RX ADMIN — ISOSORBIDE MONONITRATE 60 MG: 60 TABLET, EXTENDED RELEASE ORAL at 08:27

## 2022-06-26 RX ADMIN — HEPARIN SODIUM 5000 UNITS: 5000 INJECTION INTRAVENOUS; SUBCUTANEOUS at 17:40

## 2022-06-26 RX ADMIN — CARVEDILOL 12.5 MG: 12.5 TABLET, FILM COATED ORAL at 08:27

## 2022-06-26 RX ADMIN — FUROSEMIDE 80 MG: 40 TABLET ORAL at 08:27

## 2022-06-26 RX ADMIN — ASPIRIN 81 MG: 81 TABLET, COATED ORAL at 08:27

## 2022-06-26 RX ADMIN — CEFAZOLIN 1 G: 1 INJECTION, POWDER, FOR SOLUTION INTRAMUSCULAR; INTRAVENOUS at 17:28

## 2022-06-26 RX ADMIN — Medication 7 UNITS: at 21:34

## 2022-06-26 RX ADMIN — FUROSEMIDE 80 MG: 40 TABLET ORAL at 21:33

## 2022-06-26 RX ADMIN — HYDRALAZINE HYDROCHLORIDE 20 MG: 20 INJECTION INTRAMUSCULAR; INTRAVENOUS at 00:37

## 2022-06-26 RX ADMIN — LIDOCAINE HYDROCHLORIDE 19 ML: 10 INJECTION, SOLUTION EPIDURAL; INFILTRATION; INTRACAUDAL; PERINEURAL at 14:29

## 2022-06-26 RX ADMIN — SODIUM BICARBONATE 650 MG: 650 TABLET ORAL at 08:27

## 2022-06-26 RX ADMIN — SEVELAMER CARBONATE 1200 MG: 2400 POWDER, FOR SUSPENSION ORAL at 08:26

## 2022-06-26 RX ADMIN — DOXAZOSIN 2 MG: 1 TABLET ORAL at 08:27

## 2022-06-26 RX ADMIN — HYDROMORPHONE HYDROCHLORIDE 0.5 MG: 1 INJECTION, SOLUTION INTRAMUSCULAR; INTRAVENOUS; SUBCUTANEOUS at 08:56

## 2022-06-26 RX ADMIN — CYANOCOBALAMIN TAB 1000 MCG 1000 MCG: 1000 TAB at 08:26

## 2022-06-26 NOTE — PROGRESS NOTES
Hospitalist Progress Note    Patient: Syed To MRN: [id number]  CSN: [id number]    YOB: 1970  Age: 50 y.o. Sex: male    DOA: 6/20/2022 LOS:  LOS: 6 days          Assessment/Plan     Patient Active Problem List   Diagnosis Code    Iron deficiency anemia D50.9    Other restrictive cardiomyopathy ([facility] [address] I42.5    ESRD (end stage renal disease) on dialysis ([facility] [address] N18.6, Z99.2    HTN (hypertension) I10    Hx of BKA, right ([address] Z89.511    Uremia N19    Non-compliance with renal dialysis ([facility] [address] Z91.15    Hypertension I10    COVID-19 U07.1    Pulmonary edema J81.1    Volume overload E87.70    Sepsis ([facility] [address] A41.9    Bacteremia R78.81    Arteriovenous graft infection ([facility] [address] T82. 7XXA      Chief complaint :  Fever, cough, congestion  54 y.o. male with end-stage renal disease, diabetes, CHF, hypertension, s/p right BKA presents to ER with concerns of not feeling well, fever congestion cough and shortness of breath. ESRD-  Missed dialysis  Nephrology following  HD per nephrology  Spoke with Dr. Ally Moran surgery and he plans to place a [facility] because groin access is not working, will flush but there is no blood return after multiple attempts, made NPO    Sepsis -  POA  Secondary to bacteremia  Source TDC vs AVG  TDC removed on 6/22/22   AVG removed yesterday  Obtain blood cultures today    status post left upper extremity AV graft excision and patch angioplasty of the brachial artery with a basilic vein with temporary catheter placement in the right femoral vein. Persistent Bacteremia -  Blood cultures growing MSSA  On Cefazolin   ID following.   Blood cxs from 6/24, 6/25 and 6/26 NGTD, most impt is that 6/24 is NGTD    LUE non functioning AVG  AVG removed yesterday, lots of pus found   Has temporary dialysis catheter  Vascular will place permanent dialysis catheter once safe to do so from infection standpoint    Volume overload/pulmonary edema-  Resolved with Fluid removal with HD     Hypertension-  BP better controlled  Continue with home medications.     COVID 19-  Not hypoxic, monitor     DM-  Started on sliding scale insulin, ADA diet.     Right BKA     DVT prophylaxis with heparin    Add PT/OT consults        Disposition : TBD    S: no new complaints    Review of systems  General: No fevers or chills. Cardiovascular: No chest pain or pressure. No palpitations. Pulmonary: No shortness of breath. Gastrointestinal: No nausea, vomiting. Physical Exam:  General: Awake, cooperative, no acute distress    HEENT: NC, Atraumatic. PERRLA, anicteric sclerae. Lungs: CTA Bilaterally. No Wheezing/Rhonchi/Rales. Heart:  S1 S2,  No murmur, No Rubs, No Gallops  Abdomen: Soft, Non distended, Non tender.  +Bowel sounds,   Extremities: Right BKA, tender LUE AVG site  Psych:   Not anxious or agitated. Neurologic:  No acute neurological deficit. Vital signs/Intake and Output:  Visit Vitals  BP (!) 121/59   Pulse 82   Temp 98 °F (36.7 °C)   Resp 16   Ht 5' 7\" (1.702 m)   Wt 70.9 kg (156 lb 3.2 oz)   SpO2 100%   BMI 24.46 kg/m²     Current Shift:  No intake/output data recorded. Last three shifts:  06/24 1901 - 06/26 0700  In: 200 [I.V.:200]  Out: 50             Labs: Results:       Chemistry Recent Labs     06/26/22  0735 06/24/22  0100   GLU 81 117*   * 135*   K 4.3 4.3    99*   CO2 24 28   BUN 65* 48*   CREA 12.00* 9.33*   CA 7.7* 8.5   AGAP 9 8   BUCR 5* 5*      CBC w/Diff Recent Labs     06/25/22  0644 06/24/22  0100   WBC 10.8 15.6*   RBC 3.01* 3.53*   HGB 7.5* 8.8*   HCT 24.4* 27.8*    402   GRANS 70 81*   LYMPH 12* 8*   EOS 1 0      Cardiac Enzymes No results for input(s): CPK, CKND1, WILLIE in the last 72 hours. No lab exists for component: CKRMB, TROIP   Coagulation No results for input(s): PTP, INR, APTT, INREXT, INREXT in the last 72 hours.     Lipid Panel Lab Results   Component Value Date/Time    Cholesterol, total 115 06/02/2022 09:45 AM    HDL Cholesterol 62 (H) 06/02/2022 09:45 AM    LDL, calculated 46 06/02/2022 09:45 AM    VLDL, calculated 7 06/02/2022 09:45 AM    Triglyceride 35 06/02/2022 09:45 AM    CHOL/HDL Ratio 1.9 06/02/2022 09:45 AM      BNP No results for input(s): BNPP in the last 72 hours. Liver Enzymes No results for input(s): TP, ALB, TBIL, AP in the last 72 hours.     No lab exists for component: SGOT, GPT, DBIL   Thyroid Studies Lab Results   Component Value Date/Time    TSH 2.38 08/29/2021 01:20 AM        Procedures/imaging: see electronic medical records for all procedures/Xrays and details which were not copied into this note but were reviewed prior to creation of Plan

## 2022-06-26 NOTE — PROGRESS NOTES
Pt educated on procedure and sedation. Verbalized understanding. Pt confirmed NPO status, nothing to eat or drink since 0730.  Pt confirmed no issues with sedation in the past.

## 2022-06-26 NOTE — OP NOTES
Operative Report    Patient: Romulo Amin MRN: [id number]  SSN: [id number]    YOB: 1970  Age: 50 y.o. Sex: male       Date of Surgery: 6/26/2022     Preoperative Diagnosis: The need for hemodialysis access    Postoperative Diagnosis: The need for hemodialysis access    Surgeon(s) and Role:     Roma Lamb MD - Primary    Anesthesia: Local with lidocaine. The patient received fentanyl 100 mcg. Procedure: Procedure(s): Insertion of right sided tunneled dialysis catheter. Procedure in Detail: After informed consent the patient was brought to the suite laid in supine position the right neck and chest was prepped and draped in standard sterile fashion the area where the previous tunneled from the [facility] was over dilated with a hemostat after numbing the area with lidocaine. After that using a bare catheter and a glide wire we navigated through the old tunnel into the IJ then navigated the wire into the IVC. The catheter was advanced into the IVC then a glide advantage wire was introduced over the catheter the catheter was removed and then over the glide advantage wire we dilated the area with multiple dilators and then we introduced the tunneled dialysis catheter into the superior vena cava and right atrium junction. The area was sutured in place using nylon suture to prevent the catheter from pulling back. And was secured to the chest wall with nylon suture as well. The catheter was aspirated and flushed easily without any problems and packed with heparin. The right tunneled dialysis catheter is ready to be used. Estimated Blood Loss: Minimal      Specimens: None      Drains: None                Complications: None    Counts: Sponge and needle counts were correct times two.     Signed By:  Brit Correa MD     June 26, 2022

## 2022-06-26 NOTE — PROGRESS NOTES
See if the call from Dr. Jer Barber yesterday about the temporary dialysis catheter in the right groin not working. The blood cultures were negative for the past 48 hours. We will plan on trying to put her right tunneled dialysis catheter in the right IJ and take the temporary femoral line out or exchange the femoral line to a TDC. Depends on the ability to thread a new TDC on the right IJ.       Tiki Valdez MD

## 2022-06-26 NOTE — PROGRESS NOTES
Problem: Falls - Risk of  Goal: *Absence of Falls  Description: Document Alesia Blood Fall Risk and appropriate interventions in the flowsheet.   Outcome: Progressing Towards Goal  Note: Fall Risk Interventions:  Mobility Interventions: Bed/chair exit alarm,OT consult for ADLs,Patient to call before getting OOB,PT Consult for mobility concerns,PT Consult for assist device competence         Medication Interventions: Bed/chair exit alarm,Evaluate medications/consider consulting pharmacy,Patient to call before getting OOB,Teach patient to arise slowly    Elimination Interventions: Bed/chair exit alarm,Call light in reach,Patient to call for help with toileting needs,Stay With Me (per policy),Toileting schedule/hourly rounds,Urinal in reach    History of Falls Interventions: Evaluate medications/consider consulting pharmacy,Vital signs minimum Q4HRs X 24 hrs (comment for end date)

## 2022-06-26 NOTE — PROGRESS NOTES
Bedside and Verbal shift change report given to Kayla Santo RN (oncoming nurse) by Julio Thayer (offgoing nurse).  Report included the following information SBAR, Kardex, MAR, Recent Results and Cardiac Rhythm SR.

## 2022-06-26 NOTE — PROGRESS NOTES
TRANSFER - OUT REPORT:    Verbal report given to [address] on Pattie Nguyễn  being transferred to [facility] for routine progression of care       Report consisted of patients Situation, Background, Assessment and   Recommendations(SBAR). Information from the following report(s) SBAR, Procedure Summary and MAR was reviewed with the receiving nurse. Lines:   PICC Triple Lumen 06/24/22 Right;Other(comment) (Active)   Central Line Being Utilized Yes 06/26/22 0733   Criteria for Appropriate Use Dialysis/apheresis 06/26/22 0733   Site Assessment Clean, dry, & intact 06/26/22 0733   Phlebitis Assessment 0 06/26/22 0733   Infiltration Assessment 0 06/26/22 0733   Dressing Status Clean, dry, & intact 06/26/22 0733   Dressing Type Disk with Chlorhexadine gluconate (CHG); Transparent 06/26/22 0733   Hub Color/Line Status Blue 06/25/22 1700   Hub Color/Line Status Red 06/25/22 1700        Opportunity for questions and clarification was provided.       Patient transported with:   [employer]

## 2022-06-26 NOTE — ROUTINE PROCESS
Bedside and Verbal shift change report given to Yenifer Head RN (oncoming nurse) by Vincent Pacheco RN (offgoing nurse). Report included the following information SBAR, Kardex, MAR and Recent Results.

## 2022-06-26 NOTE — PROGRESS NOTES
RENAL CONSULT PROGRESS NOTE   2022    Patient:  Dante Garcia  :  1970  Gender:  male  MRN #:  [id number]    Reason for Consult: ESRD related care, high BP       Subjective:  Says he is fine,   no fever and nausea  BP stable , breathing stable/lying flat in bed . Denied palpitation, chest pain   ACE bandage in LUE , no pain     Objective:    Visit Vitals  BP (!) 121/59   Pulse 82   Temp 98 °F (36.7 °C)   Resp 16   Ht 5' 7\" (1.702 m)   Wt 70.9 kg (156 lb 3.2 oz)   SpO2 100%   BMI 24.46 kg/m²       Physical Exam:    Pt awake,  alert and comfortable  Lung: no crackles   Ext: no edema in LLE, Rt BKA status   LUE with ace bandage ,     CNS- Oriented to time , place and person     Laboratory Data:    Lab Results   Component Value Date    BUN 65 (H) 2022    BUN 48 (H) 2022    BUN 36 (H) 2022     (L) 2022     (L) 2022     2022    CO2 24 2022    CO2 28 2022    CO2 27 2022     Lab Results   Component Value Date    WBC 10.8 2022    HGB 7.5 (L) 2022    HCT 24.4 (L) 2022     No components found for: CALCIUM, PHOSPHORUS, MAGNESIUM  Lab Results   Component Value Date    HDL 62 (H) 2022     No results found for: SPECIMENTYP, TURBIDITY, UGLU    Imaging Reveiwed:    CXR:- IMPRESSION     Cardiomegaly with mild pulmonary edema. Assessment:  Dante Garcia is a 54y.o. year old male  h/o ESRD on HD , non compliant with dialysis /skip multiple dialysis session every month despite knowing the risk presented with c/o  fever, congestion, cough, shortness of breath, nausea, vomiting and  diarrhea. Blood culture  and pus culture from graft growing staph aureus . S/o removal of TDC and excision of LUE graft . ESRD  Uncontrolled Hypertension  Anemia of CKD  COVID-19 infection   Bacteremia/Graft infection        Plan:    Patient examined and labs reviewed this morning . Breathing is stable and white snot have hyperkalemia.  Unable to do HD yesterday due to malfunction of temporary cath. No urgent clinical and metabolic  indication of dialysis today   Vascular planning to place [facility] this afternoon, will plan dialysis tomorrow morning   Recent blood cx is negative from 6/24 and 6/25  Appreciate vascular surgery assistanc  Appreciate ID assistance, antibiotics per ID    Intake and output   Dose all  meds for ESRD   Renal diet, salt and potassium restriction      BMD- has secondary Hyperparathyroidism  Continue  calcitriol 1 mcg MWF  sevelamer for phos binders         Uncontrolled Hypertension: BP is at target range now  Resume coreg 12.5 mg BID  On doxazosine, Imdur and lasix        Anemia of CKD- Retacrit during HD       I have reviewed patient's record for pertinent labs, radiology and other test . I have reviewed old records. I have ordered labs, medications and radiology as necessary and indicated per patient's condition . Total time spent is approximately 26 minutes. Greater than 50 % of that time was spent in counseling and or care coordination.            Yen Peacock MD, MD  [facility].- Nephrology

## 2022-06-27 LAB
GLUCOSE BLD STRIP.AUTO-MCNC: 108 MG/DL (ref 70–110)
GLUCOSE BLD STRIP.AUTO-MCNC: 114 MG/DL (ref 70–110)
GLUCOSE BLD STRIP.AUTO-MCNC: 114 MG/DL (ref 70–110)
GLUCOSE BLD STRIP.AUTO-MCNC: 70 MG/DL (ref 70–110)

## 2022-06-27 PROCEDURE — 74011000250 HC RX REV CODE- 250: Performed by: INTERNAL MEDICINE

## 2022-06-27 PROCEDURE — 74011250637 HC RX REV CODE- 250/637: Performed by: HOSPITALIST

## 2022-06-27 PROCEDURE — 74011636637 HC RX REV CODE- 636/637: Performed by: HOSPITALIST

## 2022-06-27 PROCEDURE — 74011250636 HC RX REV CODE- 250/636: Performed by: INTERNAL MEDICINE

## 2022-06-27 PROCEDURE — 82962 GLUCOSE BLOOD TEST: CPT

## 2022-06-27 PROCEDURE — 74011250636 HC RX REV CODE- 250/636: Performed by: HOSPITALIST

## 2022-06-27 PROCEDURE — 74011250636 HC RX REV CODE- 250/636: Performed by: STUDENT IN AN ORGANIZED HEALTH CARE EDUCATION/TRAINING PROGRAM

## 2022-06-27 PROCEDURE — 74011250637 HC RX REV CODE- 250/637: Performed by: STUDENT IN AN ORGANIZED HEALTH CARE EDUCATION/TRAINING PROGRAM

## 2022-06-27 PROCEDURE — 74011250637 HC RX REV CODE- 250/637: Performed by: INTERNAL MEDICINE

## 2022-06-27 PROCEDURE — 65270000046 HC RM TELEMETRY

## 2022-06-27 PROCEDURE — 90935 HEMODIALYSIS ONE EVALUATION: CPT

## 2022-06-27 RX ORDER — CEFAZOLIN SODIUM/WATER 2 G/20 ML
2 SYRINGE (ML) INTRAVENOUS
Status: DISCONTINUED | OUTPATIENT
Start: 2022-06-27 | End: 2022-07-06 | Stop reason: HOSPADM

## 2022-06-27 RX ORDER — SAME BUTANEDISULFONATE/BETAINE 400-600 MG
500 POWDER IN PACKET (EA) ORAL 2 TIMES DAILY
Status: DISCONTINUED | OUTPATIENT
Start: 2022-06-27 | End: 2022-07-06 | Stop reason: HOSPADM

## 2022-06-27 RX ADMIN — FUROSEMIDE 80 MG: 40 TABLET ORAL at 08:51

## 2022-06-27 RX ADMIN — PANTOPRAZOLE SODIUM 40 MG: 40 TABLET, DELAYED RELEASE ORAL at 08:52

## 2022-06-27 RX ADMIN — DOXAZOSIN 2 MG: 1 TABLET ORAL at 08:50

## 2022-06-27 RX ADMIN — ASPIRIN 81 MG: 81 TABLET, COATED ORAL at 08:47

## 2022-06-27 RX ADMIN — ISOSORBIDE MONONITRATE 60 MG: 60 TABLET, EXTENDED RELEASE ORAL at 08:52

## 2022-06-27 RX ADMIN — CARVEDILOL 12.5 MG: 12.5 TABLET, FILM COATED ORAL at 08:50

## 2022-06-27 RX ADMIN — EPOETIN ALFA-EPBX 20000 UNITS: 20000 INJECTION, SOLUTION INTRAVENOUS; SUBCUTANEOUS at 22:38

## 2022-06-27 RX ADMIN — FUROSEMIDE 80 MG: 40 TABLET ORAL at 22:09

## 2022-06-27 RX ADMIN — CARVEDILOL 12.5 MG: 12.5 TABLET, FILM COATED ORAL at 17:03

## 2022-06-27 RX ADMIN — CEFAZOLIN 2 G: 10 INJECTION, POWDER, FOR SOLUTION INTRAVENOUS at 22:09

## 2022-06-27 RX ADMIN — HEPARIN SODIUM 3600 UNITS: 1000 INJECTION INTRAVENOUS; SUBCUTANEOUS at 13:45

## 2022-06-27 RX ADMIN — CYANOCOBALAMIN TAB 1000 MCG 1000 MCG: 1000 TAB at 08:50

## 2022-06-27 RX ADMIN — HEPARIN SODIUM 5000 UNITS: 5000 INJECTION INTRAVENOUS; SUBCUTANEOUS at 02:16

## 2022-06-27 RX ADMIN — HEPARIN SODIUM 5000 UNITS: 5000 INJECTION INTRAVENOUS; SUBCUTANEOUS at 18:34

## 2022-06-27 RX ADMIN — Medication 7 UNITS: at 22:00

## 2022-06-27 RX ADMIN — CALCITRIOL CAPSULES 0.25 MCG 0.25 MCG: 0.25 CAPSULE ORAL at 22:09

## 2022-06-27 RX ADMIN — SEVELAMER CARBONATE 1200 MG: 2400 POWDER, FOR SUSPENSION ORAL at 17:03

## 2022-06-27 RX ADMIN — SEVELAMER CARBONATE 1200 MG: 2400 POWDER, FOR SUSPENSION ORAL at 08:46

## 2022-06-27 RX ADMIN — Medication 500 MG: at 22:09

## 2022-06-27 NOTE — PROGRESS NOTES
Hospitalist Progress Note    Patient: Augustin Cruz MRN: [id number]  CSN: [id number]    YOB: 1970  Age: 50 y.o. Sex: male    DOA: 6/20/2022 LOS:  LOS: 7 days                Assessment/Plan     Patient Active Problem List   Diagnosis Code    Iron deficiency anemia D50.9    Other restrictive cardiomyopathy ([facility] [address] I42.5    ESRD (end stage renal disease) on dialysis ([facility] [address] N18.6, Z99.2    HTN (hypertension) I10    Hx of BKA, right ([facility] [address] Z89.511    Uremia N19    Non-compliance with renal dialysis ([facility] [address] Z91.15    Hypertension I10    COVID-19 U07.1    Pulmonary edema J81.1    Volume overload E87.70    Sepsis ([facility] [address] A41.9    Bacteremia R78.81    [facility] T82. 7XXA        Chief complaint :  Fever, cough, congestion  54 y.o. male with end-stage renal disease, diabetes, CHF, hypertension, s/p right BKA presents to ER with concerns of not feeling well, fever congestion cough and shortness of breath. Feeling better    Sepsis -  POA  Secondary to bacteremia  Source TDC vs AVG    Persistent Bacteremia -  Blood cultures growing MSSA  On cefazolin. ID following. LUE non functioning AVG   source of infection  Vascular consulted  S/p excision of infected AV graft left upper arm. S/p TDC placement. Volume overload/pulmonary edema-  Resolved with Fluid removal with HD     ESRD-  Missed dialysis  Nephrology following  HD per nephrology     Hypertension-  BP better controlled  Continue with home medications.     COVID 19-  Not hypoxic, monitor     DM-  Started on sliding scale insulin, ADA diet.     Right BKA     DVT prophylaxis with heparin       Disposition : TBD    Review of systems  General: No fevers or chills. Cardiovascular: No chest pain or pressure. No palpitations. Pulmonary: No shortness of breath. Gastrointestinal: No nausea, vomiting. Physical Exam:  General: Awake, cooperative, no acute distress    HEENT: NC, Atraumatic.   PERRLA, anicteric sclerae. Lungs: CTA Bilaterally. No Wheezing/Rhonchi/Rales. Heart:  S1 S2,  No murmur, No Rubs, No Gallops  Abdomen: Soft, Non distended, Non tender.  +Bowel sounds,   Extremities: Right BKA, tender LUE AVG site  Psych:   Not anxious or agitated. Neurologic:  No acute neurological deficit. Vital signs/Intake and Output:  Visit Vitals  /66   Pulse 85   Temp 98.4 °F (36.9 °C)   Resp 18   Ht 5' 7\" (1.702 m)   Wt 74.1 kg (163 lb 4.8 oz)   SpO2 100%   BMI 25.58 kg/m²     Current Shift:  06/27 0701 - 06/27 1900  In: -   Out: 1300   Last three shifts:  No intake/output data recorded. Labs: Results:       Chemistry Recent Labs     06/26/22  0735   GLU 81   *   K 4.3      CO2 24   BUN 65*   CREA 12.00*   CA 7.7*   AGAP 9   BUCR 5*      CBC w/Diff Recent Labs     06/25/22  0644   WBC 10.8   RBC 3.01*   HGB 7.5*   HCT 24.4*      GRANS 70   LYMPH 12*   EOS 1      Cardiac Enzymes No results for input(s): CPK, CKND1, WILLIE in the last 72 hours. No lab exists for component: CKRMB, TROIP   Coagulation No results for input(s): PTP, INR, APTT, INREXT, INREXT in the last 72 hours. Lipid Panel Lab Results   Component Value Date/Time    Cholesterol, total 115 06/02/2022 09:45 AM    HDL Cholesterol 62 (H) 06/02/2022 09:45 AM    LDL, calculated 46 06/02/2022 09:45 AM    VLDL, calculated 7 06/02/2022 09:45 AM    Triglyceride 35 06/02/2022 09:45 AM    CHOL/HDL Ratio 1.9 06/02/2022 09:45 AM      BNP No results for input(s): BNPP in the last 72 hours. Liver Enzymes No results for input(s): TP, ALB, TBIL, AP in the last 72 hours.     No lab exists for component: SGOT, GPT, DBIL   Thyroid Studies Lab Results   Component Value Date/Time    TSH 2.38 08/29/2021 01:20 AM        Procedures/imaging: see electronic medical records for all procedures/Xrays and details which were not copied into this note but were reviewed prior to creation of Plan

## 2022-06-27 NOTE — PROGRESS NOTES
[id number] Shift Summary: Pt rested well overnight with no complaints. No new clinical concerns noted.      Nightshift Chart Audit Completed

## 2022-06-27 NOTE — ROUTINE PROCESS
Bedside and Verbal shift change report given to King Urbina  (oncoming nurse) by Cindy Sanchez RN  (offgoing nurse). Report given with SBAR, Kardex, Intake/Output and Recent Results.

## 2022-06-27 NOTE — PROGRESS NOTES
[facility]  (A Division of [address] [place] [address])                                                                                                                      Mayi Engel MD  Office #: - Option # [phone]  Fax #: [phone]     Date of Admission: 6/20/2022Date of Note: 6/26/2022  Reason for Referral: Evaluation and antibiotic management of bacteremia/COVID 10 Infection       Current Antimicrobials:    Prior Antimicrobials:  daptomycin  6/20 to date     Cefepime 6/20 to 6/21       Assessment- ID related:  --------------------------------------------------------------------------    · 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 Proximal vein part well incorporated and overswen  Right temp HD cath placed  · COVID 19 Infection-- doubt viral PNA  --fully vaccinared and boosted 11/2021  --not clear when viral infection was acquired  · Pul edema Vs PNA-- had few missed HD sessions  · Occluded L AVG  · History of C.diff in the past    Other Medical Issues- Mx per respective team:  ESRD on HD  DM  R BKA  Hx of AMS with severe Hypoglycemia== recent admission      Recommendation for ID issues I am following:  ------------------------------------------------------------------------------  JOANNE Gregory, [address] from 6/24, 6/25 and 6/27: NGSF  --R TDC placed, no HD done      Cont cefezolin 1gm today  Will switch to Post HD from tomorrow, recommend removal of R HD as soon as possible     Monitor WBC and BM    6 weeks of IV ABX from negative [address] for IE. [facility] BCX needed after that    Guarded prognosis           Subjective:  Pt seen, feels weak. No other complaints--he had TDC placed on R chest-- but no HD so far  Afebrile.  No cough/SOB    Review of systems:  No F/C/R  No N/V or abd pain or distension  No cough/sob/chest pain  No joint swelling/pain  No itching or rash       HPI:  Liam Stephens is a 50 y.o. BLACK/ with PMH of ESRD on HD, DM. Recently admitted to [facility] with AMS and hypoglycemia. During his stay, he had underwent vascular study for occlusion on left arm, and was not able to use it, and R TDC was placed. He had missed couple of his HD sessions and he was feeling body aches and SOB and had cough with clear mucus. He came in to ED, felt he could have PNA. Other associated symptoms include N/V and loose BM without   Had high BP to 208/101, fever to 102.1 after admission. .  Currently on Vanco and Cefepime. Reports occasional pain on L arm. No abd pain. Admission CXR with cardiomegaly w/mild pul edema. WBC at 12K, no bands           Active Hospital Problems    Diagnosis Date Noted    Sepsis ([address] 06/22/2022    Bacteremia 06/22/2022    Arteriovenous graft infection ([address] 06/22/2022    COVID-19 06/20/2022    Pulmonary edema 06/20/2022    Volume overload 06/20/2022    ESRD (end stage renal disease) on dialysis ([address] 01/29/2020     Past Medical History:   Diagnosis Date    Chronic kidney disease     Diabetes ([address]     Heart failure ([address]     [facility]     Hypertension     Sickle cell trait ([facility]      Past Surgical History:   Procedure Laterality Date    HX ORTHOPAEDIC      right BKA 2017    IR DECLOT AV GRAFT PERCUTANEOUS  6/3/2022    IR INSERT NON TUNL CVC OVER 5 YRS  6/1/2022     History reviewed. No pertinent family history.   Social History     Socioeconomic History    Marital status: LEGALLY      Spouse name: Not on file    Number of children: Not on file    Years of education: Not on file    Highest education level: Not on file   Occupational History    Not on file   Tobacco Use    Smoking status: Former Smoker    Smokeless tobacco: Never Used   Substance and Sexual Activity    Alcohol use: No    Drug use: No    Sexual activity: Not on file   Other Topics Concern    Not on file   Social History Narrative    Not on file     Social Determinants of Health     Financial Resource Strain:     Difficulty of Paying Living Expenses: Not on file   Food Insecurity:     Worried About Running Out of Food in the Last Year: Not on file    Danii of Food in the Last Year: Not on file   Transportation Needs:     Lack of Transportation (Medical): Not on file    Lack of Transportation (Non-Medical):  Not on file   Physical Activity:     Days of Exercise per Week: Not on file    Minutes of Exercise per Session: Not on file   Stress:     Feeling of Stress : Not on file   Social Connections:     Frequency of Communication with Friends and Family: Not on file    Frequency of Social Gatherings with Friends and Family: Not on file    Attends Catholic Services: Not on file    Active Member of [address] [place] [address] [employer] or Organizations: Not on file    Attends Club or Organization Meetings: Not on file    Marital Status: Not on file   Intimate Partner Violence:     Fear of Current or Ex-Partner: Not on file    Emotionally Abused: Not on file    Physically Abused: Not on file    Sexually Abused: Not on file   Housing Stability:     Unable to Pay for Housing in the Last Year: Not on file    Number of Places Lived in the Last Year: Not on file    Unstable Housing in the Last Year: Not on file       Allergies:  Penicillins and Vancomycin     Medications:  Current Facility-Administered Medications   Medication Dose Route Frequency    diphenhydrAMINE (BENADRYL) injection 25-50 mg  25-50 mg IntraVENous Rad Multiple    fentaNYL citrate (PF) injection  mcg   mcg IntraVENous Rad Multiple    naloxone (NARCAN) injection 0.4 mg  0.4 mg IntraVENous PRN    HYDROmorphone (DILAUDID) injection 0.5 mg  0.5 mg IntraVENous Q3H PRN    oxyCODONE-acetaminophen (PERCOCET) 5-325 mg per tablet 1-2 Tablet  1-2 Tablet Oral Q6H PRN    ceFAZolin (ANCEF) 1 g in 0.9% sodium chloride (MBP/ADV) 50 mL MBP  1 g IntraVENous Q24H    insulin glargine (LANTUS) injection 7 Units  7 Units SubCUTAneous QHS    epoetin delonte-epbx (RETACRIT) injection 20,000 Units  20,000 Units SubCUTAneous Q MON, WED & FRI    sevelamer carbonate (RENVELA) oral powder 1,200 mg  1,200 mg Oral TID WITH MEALS    ondansetron (ZOFRAN) injection 4 mg  4 mg IntraVENous Q8H PRN    acetaminophen (TYLENOL) tablet 650 mg  650 mg Oral Q6H PRN    Or    acetaminophen (TYLENOL) suppository 650 mg  650 mg Rectal Q6H PRN    guaiFENesin-dextromethorphan (ROBITUSSIN DM) 100-10 mg/5 mL syrup 5 mL  5 mL Oral Q4H PRN    sodium chloride (NS) flush 5-10 mL  5-10 mL IntraVENous PRN    carvediloL (COREG) tablet 12.5 mg  12.5 mg Oral BID WITH MEALS    calcitRIOL (ROCALTROL) capsule 0.25 mcg  0.25 mcg Oral Q MON, WED & FRI    aspirin delayed-release tablet 81 mg  81 mg Oral DAILY    cyanocobalamin tablet 1,000 mcg  1,000 mcg Oral DAILY    doxazosin (CARDURA) tablet 2 mg  2 mg Oral DAILY    furosemide (LASIX) tablet 80 mg  80 mg Oral BID    isosorbide mononitrate ER (IMDUR) tablet 60 mg  60 mg Oral DAILY    pantoprazole (PROTONIX) tablet 40 mg  40 mg Oral ACB    glucose chewable tablet 16 g  16 g Oral PRN    glucagon (GLUCAGEN) injection 1 mg  1 mg IntraMUSCular PRN    insulin lispro (HUMALOG) injection   SubCUTAneous AC&HS    dextrose 10% infusion 0-250 mL  0-250 mL IntraVENous PRN    hydrALAZINE (APRESOLINE) 20 mg/mL injection 20 mg  20 mg IntraVENous Q6H PRN    heparin (porcine) injection 5,000 Units  5,000 Units SubCUTAneous Q8H    heparin (porcine) 1,000 unit/mL injection 3,600 Units  3,600 Units Hemodialysis DIALYSIS PRN      Physical Exam:    Temp (24hrs), Av.1 °F (36.7 °C), Min:97.3 °F (36.3 °C), Max:98.9 °F (37.2 °C)    Visit Vitals  BP (!) 168/82   Pulse 81   Temp 98.9 °F (37.2 °C)   Resp 20   Ht 5' 7\" (1.702 m)   Wt 74.1 kg (163 lb 4.8 oz)   SpO2 100%   BMI 25.58 kg/m²     R chest  TDC in place--removed 6/22  New TDC placed 6/26       GEN: WD Chronically ill looking - on 2L NC  not in resp distress. HEENT: Unicteric. EOMI intact  No neck swelling  CHEST: Non laboured breathing. CTA  CVS:RRR, no mur/gallop  ABD: Obese/soft. Non tender. Non distended  YOVANY: Deferred  R groin-- HD cath in place, no dressing over the line  EXT: No apparent swelling or redness on UE/LE joints except some drainage on lower border of AVG graft that opened up  R BKA  Skin: Dry and intact. No rash, no redness. CNS: A, OX3. Moves all extremity. CN grossly ok.       Microbiology  All Micro Results     Procedure Component Value Units Date/Time    CULTURE, Dulcy Melody STAIN [[id number]]  (Abnormal) Collected: 06/24/22 1930    Order Status: Completed Specimen: Wound from Arm Updated: 06/26/22 1402     Special Requests: NO SPECIAL REQUESTS        GRAM STAIN RARE WBCS SEEN         NO ORGANISMS SEEN        Culture result:       SCANT POSSIBLE STAPHYLOCOCCUS SPECIES          CULTURE, BLOOD [[id number]] Collected: 06/26/22 0726    Order Status: Completed Specimen: Blood Updated: 06/26/22 0754    CULTURE, BLOOD [[id number]] Collected: 06/25/22 1215    Order Status: Completed Specimen: Blood Updated: 06/26/22 0749     Special Requests: NO SPECIAL REQUESTS        Culture result: NO GROWTH AFTER 18 HOURS       CULTURE, BLOOD [[id number]] Collected: 06/24/22 0100    Order Status: Completed Specimen: Blood Updated: 06/26/22 0749     Special Requests: NO SPECIAL REQUESTS        Culture result: NO GROWTH 2 DAYS       CULTURE, WOUND Marquez Post STAIN [[id number]]  (Abnormal)  (Susceptibility) Collected: 06/22/22 1505    Order Status: Completed Specimen: Catheter Tip Updated: 06/25/22 0844     Special Requests: NO SPECIAL REQUESTS        Culture result:       COLONY COUNT =>100 COLONIES STAPHYLOCOCCUS AUREUS          CULTURE, ANAEROBIC [[id number]] Collected: 06/24/22 1930    Order Status: Canceled Specimen: Surgical Specimen     CULTURE, WOUND Marquez Post STAIN [[id number]]  (Abnormal)  (Susceptibility) Collected: 06/22/22 0915    Order Status: Completed Specimen: Wound from Abscess Updated: 06/24/22 0856     Special Requests: NO SPECIAL REQUESTS        GRAM STAIN OCCASIONAL WBCS SEEN               RARE GRAM POSITIVE COCCI IN PAIRS           Culture result:       HEAVY STAPHYLOCOCCUS AUREUS          C. DIFFICILE AG & TOXIN A/B [[id number]]  (Abnormal) Collected: 06/22/22 1945    Order Status: Completed Specimen: Stool from Miscellaneous sample Updated: 06/23/22 1410     PCR Reflex       See Reflex order for C. difficile (DNA)           INTERPRETATION       Indeterminate for Toxigenic C. difficile, DNA confirmation to follow. Ruby Tilleyman DIFF MOLECULAR [[id number]] Collected: 06/22/22 1945    Order Status: Completed Specimen: Miscellaneous sample Updated: 06/23/22 1410     C Diff Molecular Negative        Comment: This specimen is negative for toxigenic C difficile by DNA amplification. Repeat testing is not recommended for confirmation, samples received within 7 days of this negative result will be rejected.        CULTURE, BLOOD [[id number]]  (Abnormal) Collected: 06/21/22 0244    Order Status: Completed Specimen: Blood Updated: 06/23/22 1336     Special Requests: NO SPECIAL REQUESTS        GRAM STAIN       GRAM POSITIVE COCCI IN CLUSTERS ANAEROBIC BOTTLE                  SMEAR CALLED TO AND CORRECTLY REPEATED BY: Betty Neves RN 3N, TO [facility] AT 1847 6/21/2022           Culture result:       STAPHYLOCOCCUS AUREUS GROWING IN BOTH BOTTLES DRAWN No Site Indicated REFER TO [id number] FOR SENSITIVITIES          CULTURE, BLOOD [[id number]]  (Abnormal) Collected: 06/21/22 0253    Order Status: Completed Specimen: Blood Updated: 06/23/22 1335     Special Requests: NO SPECIAL REQUESTS        GRAM STAIN       GRAM POSITIVE COCCI IN CLUSTERS ANAEROBIC BOTTLE                  SMEAR CALLED TO AND CORRECTLY REPEATED BY: Betty Neves RN 3N, TO [facility] AT 7162 6/21/2022                  Nikole Barton POSITIVE COCCI IN CLUSTERS AEROBIC BOTTLE                  SMEAR CALLED TO AND CORRECTLY REPEATED BY: GUERO RODRIGUEZ RN 3N AT 1885 ON 6/22/22 TO [facility]. Culture result:       STAPHYLOCOCCUS AUREUS GROWING IN BOTH BOTTLES DRAWN No Site Indicated REFER TO [id number] FOR SENSITIVITIES          CULTURE, BLOOD [[id number]]  (Abnormal) Collected: 06/20/22 0915    Order Status: Completed Specimen: Blood Updated: 06/23/22 0944     Special Requests: NO SPECIAL REQUESTS        GRAM STAIN       ANAEROBIC BOTTLE GRAM POSITIVE COCCI IN CLUSTERS                  SMEAR CALLED TO AND CORRECTLY REPEATED BY: DAVIS Ko RN 3N ON 6/21/22 AT 0531 TO [facility]. GRAM POSITIVE COCCI IN CLUSTERS AEROBIC BOTTLE                  SMEAR CALLED TO AND CORRECTLY REPEATED BY: ELISA TOLLIVER RN 3N TO [address] [place] [address] AT 4096 ON 6/21/22. Culture result:       STAPHYLOCOCCUS AUREUS GROWING IN BOTH BOTTLES DRAWN SITE= RT ARM REFER TO [id number] FOR SENSITIVITIES          CULTURE, BLOOD [[id number]]  (Abnormal)  (Susceptibility) Collected: 06/20/22 0940    Order Status: Completed Specimen: Blood Updated: 06/23/22 0943     Special Requests: NO SPECIAL REQUESTS        GRAM STAIN       ANAEROBIC BOTTLE GRAM POSITIVE COCCI IN CLUSTERS                  SMEAR CALLED TO AND CORRECTLY REPEATED BY: DAVIS Ko RN 3N ON 6/21/22 AT 0531 TO [facility].                   GRAM POSITIVE COCCI IN CLUSTERS AEROBIC BOTTLE                  SMEAR CALLED TO AND CORRECTLY REPEATED BY: [facility], RN 3N, TO [facility] AT 1441 6/21/2022           Culture result:       STAPHYLOCOCCUS AUREUS GROWING IN BOTH BOTTLES DRAWN SITE = RARM          CULTURE, CATHETER TIP [[id number]] Collected: 06/22/22 1459    Order Status: Canceled Specimen: CVP Catheter Tip     BLOOD CULTURE ID PANEL [[id number]]  (Abnormal) Collected: 06/20/22 0940    Order Status: Completed Specimen: Blood Updated: 06/22/22 0031     Acc. no. from Micro Order [id number]     Enterococcus faecalis Not detected Enterococcus faecium Not detected        Listeria monocytogenes Not detected        Staphylococcus Detected        Staphylococcus aureus Detected        Staph epidermidis Not detected        Staph lugdunensis Not detected        Streptococcus Not detected        Streptococcus agalactiae (Group B) Not detected        Streptococcus pneumoniae Not detected        Streptococcus pyogenes (Group A) Not detected        Acinetobacter calcoaceticus-baumanii complex Not detected        Bacteroides fragilis Not detected        Enterobacterales species Not detected        Enterobacter cloacae complex Not detected        Escherichia coli Not detected        Klebsiella aerogenes Not detected        Klebsiella oxytoca Not detected        Klebsiella pneumoniae group Not detected        Proteus Not detected        Salmonella Not detected        Serratia marcescens Not detected        Haemophilus influenzae Not detected        Neisseria meningitidis Not detected        Pseudomonas aeruginosa Not detected        Steno maltophilia Not detected        Candida albicans Not detected        Candida auris Not detected        Candida glabrata Not detected        Candida krusei Not detected        Candida parapsilosis Not detected        Candida tropicalis Not detected        Crypto neoformans/gattii Not detected        RESISTANT GENES:            mecA/C and MREJ (Methicillin resistant gene) Not detected        Comment       False positive results may rarely occur.  Correlate with clinical,epidemiologic, and other laboratory findings           Comment: Please see BCID Interpretation Guide in EPIC Links       LEGIONELLA PNEUMOPHILA AG, URINE [[id number]] Collected: 06/21/22 0630    Order Status: Canceled Specimen: Urine     Daniel Hai, URINE [[id number]] Collected: 06/21/22 0630    Order Status: Canceled Specimen: Urine     CULTURE, RESPIRATORY/SPUTUM/BRONCH Tere Yang [[id number]] Collected: 06/21/22 0630    Order Status: Canceled Specimen: Sputum     COVID-19 RAPID TEST [[id number]]  (Abnormal) Collected: 06/20/22 0925    Order Status: Completed Specimen: Nasopharyngeal Updated: 06/20/22 1013     Specimen source Nasopharyngeal        COVID-19 rapid test Detected        Comment:      The specimen is POSITIVE for SARS-CoV-2, the novel coronavirus associated with COVID-19. This test has been authorized by the FDA under an Emergency Use Authorization (EUA) for use by authorized laboratories. Fact sheet for Healthcare Providers: ConventionEndradate.co.nz  Fact sheet for Patients: Typemockdate.co.nz       Methodology: Isothermal Nucleic Acid Amplification  CALLED TO AND CORRECTLY REPEATED BY:  DR TIAN ED 6/20/22 AT 1013 TO [facility]         INFLUENZA A & B AG (RAPID TEST) [[id number]] Collected: 06/20/22 0925    Order Status: Completed Specimen: Nasopharyngeal from Nasal washing Updated: 06/20/22 1012     Influenza A Antigen Negative        Comment: A negative result does not exclude influenza virus infection, seasonal or H1N1 due to suboptimal sensitivity. If influenza is circulating in your community, a diagnosis of influenza should be considered based on a patients clinical presentation and empiric antiviral treatment should be considered, if indicated. Influenza B Antigen Negative              Lab results:    Chemistry  Recent Labs     06/26/22  0735 06/24/22  0100   GLU 81 117*   * 135*   K 4.3 4.3    99*   CO2 24 28   BUN 65* 48*   CREA 12.00* 9.33*   CA 7.7* 8.5   AGAP 9 8   BUCR 5* 5*       CBC w/ Diff  Recent Labs     06/25/22  0644 06/24/22  0100   WBC 10.8 15.6*   RBC 3.01* 3.53*   HGB 7.5* 8.8*   HCT 24.4* 27.8*    402   GRANS 70 81*   LYMPH 12* 8*   EOS 1 0       Imaging: report reviewed and as posted by radiologist   Results from [place] encounter on 06/01/22    CT HEAD WO CONT    Narrative  EXAM: CT head    INDICATION: Confusion.     COMPARISON: 8/29/2021    TECHNIQUE: Axial CT imaging of the head was performed without intravenous  contrast. Standard multiplanar coronal and sagittal reformatted images were  obtained and are included in interpretation. One or more dose reduction techniques were used on this CT: automated exposure  control, adjustment of the mAs and/or kVp according to patient size, and  iterative reconstruction techniques. The specific techniques used on this CT  exam have been documented in the patient's electronic medical record. Digital  Imaging and Communications in Medicine (DICOM) format image data are available  to nonaffiliated external healthcare facilities or entities on a secure, media  free, reciprocally searchable basis with patient authorization for at least a  12-month period after this study. _______________    FINDINGS:    BRAIN AND POSTERIOR FOSSA: Right central arden hypodensity, new from prior study. No evidence of acute large vessel transcortical infarct or acute parenchymal  hemorrhage. No midline shift or hydrocephalus. EXTRA-AXIAL SPACES AND MENINGES: There are no abnormal extra-axial fluid  collections. CALVARIUM: Intact. SINUSES: Clear. OTHER: None.    _______________    Impression  Right central arden hypodensity, new from prior study, age indeterminate infarct. Correlate clinically.

## 2022-06-27 NOTE — PROGRESS NOTES
Pharmacy to change cefazolin to 2/2/3gm Post dialysis, once patient resumes his HD to MWF. Rigoberto Hensley MD  [facility])  Office #:     -[facility] #[phone]   Office Fax: [phone]

## 2022-06-27 NOTE — PROGRESS NOTES
Assessment:    dialysis today , pt is non compliant, cut dialysis short, does not show up for dialysis or comes to HD but then goes to bathroom and spend good time there, then leaves dialysis without  HD. Poor prognosis and high mortality. Recent blood cx is negative from 6/24 and 6/25  Appreciate vascular surgery assistanc  Appreciate ID assistance, antibiotics per ID    Intake and output   Dose all  meds for ESRD   Renal diet, salt and potassium restriction      BMD- has secondary Hyperparathyroidism  Continue  calcitriol 1 mcg MWF  sevelamer for phos binders         Uncontrolled Hypertension: BP is at target range now  Resume coreg 12.5 mg BID  On doxazosine, Imdur and lasix        Anemia of CKD- Retacrit during HD        CC:ESRF  Interval History: stable post HD      Subjective:   PT does not have any somatic complaints              Blood pressure 127/66, pulse 85, temperature 98.4 °F (36.9 °C), resp. rate 18, height 5' 7\" (1.702 m), weight 74.1 kg (163 lb 4.8 oz), SpO2 100 %.       NAD, Conversant    Psychicatric: poor judgement        Intake/Output Summary (Last 24 hours) at 6/27/2022 1826  Last data filed at 6/27/2022 1330  Gross per 24 hour   Intake --   Output 1300 ml   Net -1300 ml      Recent Labs     06/25/22  0644   WBC 10.8     Lab Results   Component Value Date/Time    Sodium 134 (L) 06/26/2022 07:35 AM    Potassium 4.3 06/26/2022 07:35 AM    Chloride 101 06/26/2022 07:35 AM    CO2 24 06/26/2022 07:35 AM    Anion gap 9 06/26/2022 07:35 AM    Glucose 81 06/26/2022 07:35 AM    BUN 65 (H) 06/26/2022 07:35 AM    Creatinine 12.00 (H) 06/26/2022 07:35 AM    BUN/Creatinine ratio 5 (L) 06/26/2022 07:35 AM    GFR est AA 5 (L) 06/26/2022 07:35 AM    GFR est non-AA 4 (L) 06/26/2022 07:35 AM    Calcium 7.7 (L) 06/26/2022 07:35 AM        Current Facility-Administered Medications   Medication Dose Route Frequency Provider Last Rate Last Admin    ceFAZolin (ANCEF) 2 g/20 mL in sterile water IV syringe  2 g IntraVENous Once per day on Mon Wed Emily Blackwell MD        [START ON 7/1/2022] ceFAZolin (ANCEF) 3 g/30 mL in sterile water IV syringe  3 g IntraVENous every Friday Emily Blackwell MD        Saccharomyces boulardii (FLORASTOR) capsule 500 mg  500 mg Oral BID Emily Blackwell MD        diphenhydrAMINE (BENADRYL) injection 25-50 mg  25-50 mg IntraVENous Rad Kaylee Fernandes MD   50 mg at 06/26/22 1356    fentaNYL citrate (PF) injection  mcg   mcg IntraVENous Rad Multiple Elmer Fernandes MD   50 mcg at 06/26/22 1406    naloxone (NARCAN) injection 0.4 mg  0.4 mg IntraVENous PRN Elmer Fernandes MD        HYDROmorphone (DILAUDID) injection 0.5 mg  0.5 mg IntraVENous Q3H PRN Shante Fuller PA-C   0.5 mg at 06/26/22 0856    oxyCODONE-acetaminophen (PERCOCET) 5-325 mg per tablet 1-2 Tablet  1-2 Tablet Oral Q6H PRN Shante Fuller PA-C   2 Tablet at 06/25/22 7777    insulin glargine (LANTUS) injection 7 Units  7 Units SubCUTAneous QHS Tomasz MEJIAS MD   7 Units at 06/26/22 2134    epoetin delonte-epbx (RETACRIT) injection 20,000 Units  20,000 Units SubCUTAneous Q MON, WED & FRI Jorge Tobar MD   20,000 Units at 06/22/22 2154    sevelamer carbonate (RENVELA) oral powder 1,200 mg  1,200 mg Oral TID WITH MEALS José Alston MD   1,200 mg at 06/27/22 1703    ondansetron (ZOFRAN) injection 4 mg  4 mg IntraVENous Q8H PRN Thaddeus Dwyer MD        acetaminophen (TYLENOL) tablet 650 mg  650 mg Oral Q6H PRN Jaki Watson MD   650 mg at 06/22/22 1417    Or    acetaminophen (TYLENOL) suppository 650 mg  650 mg Rectal Q6H PRN Jaki Watson MD        guaiFENesin-dextromethorphan (ROBITUSSIN DM) 100-10 mg/5 mL syrup 5 mL  5 mL Oral Q4H PRN Jaki Watson MD        sodium chloride (NS) flush 5-10 mL  5-10 mL IntraVENous PRN Amna Licea MD   10 mL at 06/22/22 0650    carvediloL (COREG) tablet 12.5 mg  12.5 mg Oral BID WITH MEALS José lAston MD   12.5 mg at 06/27/22 1703    calcitRIOL (ROCALTROL) capsule 0.25 mcg  0.25 mcg Oral Q MON, WED & José Crespo MD   0.25 mcg at 06/24/22 2151    aspirin delayed-release tablet 81 mg  81 mg Oral DAILY Karena Cowart MD   81 mg at 06/27/22 0847    cyanocobalamin tablet 1,000 mcg  1,000 mcg Oral DAILY Karena Cowart MD   1,000 mcg at 06/27/22 0850    doxazosin (CARDURA) tablet 2 mg  2 mg Oral DAILY Bay MEJIAS MD   2 mg at 06/27/22 0850    furosemide (LASIX) tablet 80 mg  80 mg Oral BID Bay MEJIAS MD   80 mg at 06/27/22 0851    isosorbide mononitrate ER (IMDUR) tablet 60 mg  60 mg Oral DAILY Bay MEJIAS MD   60 mg at 06/27/22 0852    pantoprazole (PROTONIX) tablet 40 mg  40 mg Oral ACB Bay MEJIAS MD   40 mg at 06/27/22 6420    glucose chewable tablet 16 g  16 g Oral PRN Karena Cowart MD        glucagon (GLUCAGEN) injection 1 mg  1 mg IntraMUSCular PRN Karena Cowart MD        insulin lispro (HUMALOG) injection   SubCUTAneous AC&HS Karena Cowart MD   4 Units at 06/23/22 1142    dextrose 10% infusion 0-250 mL  0-250 mL IntraVENous PRN Karena Cowart MD        hydrALAZINE (APRESOLINE) 20 mg/mL injection 20 mg  20 mg IntraVENous Q6H PRN Karena Cowart MD   20 mg at 06/26/22 0037    heparin (porcine) injection 5,000 Units  5,000 Units SubCUTAneous Q8H Bay MEJIAS MD   5,000 Units at 06/27/22 0216    heparin (porcine) 1,000 unit/mL injection 3,600 Units  3,600 Units Hemodialysis DIALYSIS PRN Charlie Pelaez MD   3,600 Units at 06/27/22 1345

## 2022-06-27 NOTE — PROGRESS NOTES
Comprehensive Nutrition Assessment    Type and Reason for Visit: Initial,RD nutrition re-screen/LOS    Nutrition Recommendations/Plan:   1. Supplement: nepro 1x daily to help meet needs (425kcal, 19g PRO, 28g CHO). Malnutrition Assessment:  Malnutrition Status: At risk for malnutrition (specify) (06/27/22 1401)      Nutrition Assessment:    Patient with ESRD- HA, diabetes, CHF, HTN, s/p right BKA. Presented with concerns of not feeling wellm fever congestion cough shortness of breath. Admitted with sepsis secondary to bacteremia, pulmonary edema (resolved- fluid removed with HD), COVID 19. Nutrition Related Findings:    Lab: Na 134, GFR  est AA 5, BUN 65, creatinine 12.00. Med: renvela, humalog, lantus, lasix, retacrit, vit b12, calcitriol. BM 6/27. Last PO documented: % (6/23/22). Attempted to speak with pt- was busy. Wound Type: None    Current Nutrition Intake & Therapies:  Average Meal Intake: %  Average Supplement Intake: None ordered  ADULT DIET Regular; Low Sodium (2 gm); Low Potassium (Less than 3000 mg/day); 60 to 80 gm; 1500 ml    Anthropometric Measures:  Height: 5' 7\" (170.2 cm)  Ideal Body Weight (IBW): 148 lbs (67 kg)     Current Body Wt:  74.1 kg (163 lb 5.8 oz), 110.4 % IBW. Current BMI (kg/m2): 25.6  Usual Body Weight: 77.7 kg (171 lb 4.8 oz) (9/2/2021 lasr RD note)  % Weight Change (Calculated): -4.6  Weight Adjustment: Amputation  Total Amputation Percentage: 5.9  Adjusted Ideal Body Weight (lbs) (Calculated): 139.3  Adjusted Ideal Body Weight (kg) (Calculated): 63.32 kg  Adjusted % IBW (Calculated): 117.3  Adjusted BMI (Calculated): 27.1  BMI Category: Overweight (BMI 25.0-29. 9)    Estimated Daily Nutrient Needs:  Energy Requirements Based On: Kcal/kg  Weight Used for Energy Requirements: Current  Energy (kcal/day): 2223  Weight Used for Protein Requirements: Other (specify) (Protein Restriction per diet order)  Protein (g/day): 60-80  Method Used for Fluid Requirements: Other (comment) (Fluid restriction per diet order.)  Fluid (ml/day): 1500    Nutrition Diagnosis:   · Increased nutrient needs related to renal dysfunction as evidenced by dialysis      Nutrition Interventions:   Food and/or Nutrient Delivery: Continue current diet,Start oral nutrition supplement  Nutrition Education/Counseling: No recommendations at this time  Coordination of Nutrition Care: Continue to monitor while inpatient       Goals:     Goals: PO intake 75% or greater,by next RD assessment       Nutrition Monitoring and Evaluation:   Behavioral-Environmental Outcomes: None identified  Food/Nutrient Intake Outcomes: Diet advancement/tolerance,Food and nutrient intake  Physical Signs/Symptoms Outcomes: Biochemical data,Hemodynamic status,Meal time behavior,Skin,Weight,GI status    Discharge Planning:         Stephanie Montero, RD

## 2022-06-27 NOTE — PROGRESS NOTES
TREATMENT SUMMARY   Patient in room 342 dialyzed for 3 hours. Tolerated tx well with without complaint or complications. Right TDC functioning without complication accessing or BFR    [id number]  1800 ml UF removed with a net UF removal of 1300 ml. Report given to Alfonso Tao RN with all questions answered. TREATMENT NOTES       Received report from Alfonso Tao RN at [address] was initiated while patient was in bed without difficulty. Aseptically, the CVC ports were accesed and flushed without problem. Treatment was started without reversing the lines. Will continue to monitor patients closely during treatment. ACUTE HEMODIALYSIS FLOW SHEET       PATIENT INFORMATION   [facility]       DR. Alston    []1st Time Acute  []Stat[x] Routine []Urgent []Chronic Unit   []Acute Room [x]Bedside  []ICU/CCU []ER   Isolation Precautions: [x]Dialysis[] Airborne []Contact [x]Droplet []Reverse    Special Considerations:_______  [] Blood Consent Verified  [x]N/A   Allergies:[] NKA                 [x] _____________ Code Status [x]Full Code [] DNR  [] Other_____   Diet: [] Renal [] NPO [] Diabetic   [] Enteral Feeding [] Cardiac Diabetic: [x]Yes []No     [x]Signed Treatment Consent Verified   [x] Time Out/ Safety Check 1025   PRIMARY NURSE REPORT: FIRST INITIAL/ LAST NAME/TITLE  PRE DIALYSIS:                                           TIME:   ACCESS   CATHETER ACCESS: [] N/A  [x] RIGHT  [] LEFT  [x] IJ  [] SUBCL [] FEM                    [] First use X-ray  [x] Tunnel     [] Non-Tunneled      [x] No S/S infection  [] Redness [] Drainage  [] Cultured [] Swelling [] Pain                    [] Medical Aseptic [] Prep Dressing Changed                  [] Clotted [] Patent []      Flows: [] Good [] Poor [] Reversed                 If Access Problem Dr. Rani Ortiz: [] Yes [] No    Date:_____  [] N/A[] GRAFT/FISTULA ACCESS:  [] N/A  [] RIGHT  [] LEFT  [] UE   [] LE       [] AVG  [] AVF [] BUTTONHOLE    [] +BRUIT/THRILL [] MEDICAL ASEPTIC PREP     [] No S/S infection  [] Redness [] Drainage  [] Cultured [] Swelling [] Pain              If Access Problem Dr. Morfin Blight: [] Yes [] No    Date:______ [] N/A   GENERAL ASSESSMENT   LUNGS:  SaO2% ____ [x] Clear [] Coarse [] Crackles [] Wheezing               [] Diminished Location: [] RLL [] LLL [] RUL [] KYLAH    COUGH:  [] Productive  [] Loose[] Dry [] N/A  RESPIRATIONS: [] Easy [] Labored    THERAPY: [x] RA   [] NC _____. L/min    Mask: [] NRB [] Venti  _____O2%                  [] Ventilator [] Intubated [] Trach [] BiPap [] CPap [] HI Flow   CARDIAC: [x] Regular [] Irregular [] Pericardial Rub [] JVD               Monitored Rhythm:______ [] N/A   EDEMA: [x] None [] Generalized [] Facial [] Pedal [] UE [] LE             [] Pitting [] 1 [] 2 [] 3 [] 4    [] Right [] Left [] Bilateral   SKIN:    [x] Warm [] Hot [] Cold  [] Dry [] Pale [] Diaphoretic              [] Flushed [] Jaundiced [] Cyanotic [] Rash [] Weeping     LOC:    [x] Alert  Oriented to: [x] Person [x] Place [x] Time             [] Confused [] Lethargic [] Medicated [] Non-responsive    GI/ABDOMEN: [x] Flat [] Distended [] Soft [] Firm [x] Diarrhea [x] Bowel Sounds Present [] Nausea [] Vomiting    PAIN: [x] 0 [] 1 [] 2 [] 3 [] 4 [] 5 [] 6 [] 7 [] 8 [] 9 [] 10          Scale 1-10 Action/Follow Up_____   MOBILITY: [] Amb [] Amb/Assist [x] Bed  [] Wheelchair    CURRENT LABS   HBsAg ONLY: Date Drawn:06-20-22             [x] Negative [] Positive [] Unknown.      HBsAb: Date Drawn: 06-01-22             [] Susceptible <10 [x] Immune ?10 [] Unknown   Date of Current Labs:  ATTACHED     EDUCATION   Person Educated: [x] Patient [] Other_________   Knowledge base: [] None [] Minimal [x] Substantial    Barriers to learning  [] None _______________   Preferred method of learning: [] Written [] Oral [x] Visual [] Hands on Topic: [] Access Care [x] S&S of infection [x] Fluid Management   [x] K+  [] Procedural  [] Albumin [] Medications [] Tx Options   [] Transplant [] Diet [] Other    Teaching Tools: [] Explain [] Demonstration [] Handout_____ [] Video______  CARE PLAN    [x] Renal Failure (Adult)  Interdisciplinary  · Fluid and electrolytes stabilized  ? Interventions  · Dehydration signs and symptoms (eg: Weight/lab monitoring; vomiting/diarrhea/urine; tenting; mucous membranes; dizziness/lethargy/irritability/confusion; weak pulse; tachycardia; blood pressure; I&O)  · Fluid overload signs and symptoms assessment (eg: Body weight increased; dyspnea; edema; hypertension; respiratory crackles/wheezing; JVD; lab monitoring; mental status changes; I&O)  · Monitor appropriate lab values  · COMPLIANCE WITH PRESCRIBED THERAPY  · ARTERIAL ACCESS SITE ASSESSMENT  · NUTRITION SCREENING  · Vital signs monitoring per assessed patient condition or unit standard  · Cardiac monitoring  · Hydration management  · Intake and output measurement  · Body weight monitoring  · Skin care  · DIALYSIS  · Nutrition Care Process per nutrition screen  · Oral hygiene care every 2 hours  · Pain management     · Outcome   ? [x] Progressing Towards Goal  ? [] Not Progressing Towards Goals  ? [] Goals Met/Resolved  ? [] Goals Not Met/ Resolved        · Patient/ Family Education  ?  Progressing Towards Goals          RO/HEMODIAYLSIS MACHINE SAFETY CHECKS- BEFORE EACH TREATMENT          [] [facility]: Machine Serial #1:  [id number]   RO Serial #1: [id number]                       [x] [facility]: Machine Serial #2:  [id number]   RO Serial #2: [id number]        [] [facility]: Machine Serial #3:  [id number]    Serial #[id number]    Alarm Test: [x] Pass  Time_1017_______  [x] RO/Machine Log Complete    [x] Extracorporeal circuit Tested for integrity           Dialyzer_[id number]   [id number]   Dialysate: pH7.2___  Temp.36____Conductivity: Meter _14.0___ HD Machine_14.0__   CHLORINE TESTING- BEFORE EACH TREATMENT AND EVERY 4 HOURS   Total Chlorine: [x] Less than 0.1 ppm Time:0945_2nd Check Time:______  (If greater than 0.1 ppm from Primary then every 30 minutes from Secondary)   TREATMENT INIATION-WITH DIALYSIS PRECAUTIONS   [x] All Connections Secured   [x] Saline Line Double Clamped    [x] Venous Parameters Set [x] Arterial Parameters Set    [x] Prime Given 250 ml     [x] Air Foam Detector Engaged   PRE-TREATMENT   UF Calculations: Wt to lose:2000____ml(+) Oral:__ml(+)IV Meds/Fluids/Blood prods___ml(+) Prime/Rinse_500__ml(=)Total UF Goal_2500___mL   Scale Type:[x] Bed scale [] Sling Scale [] Wheel Chair Scale []  Not Ordered [] [] Unable to obtain pt on stretcher/ bed scale malfunctioning   Tx Initiation Note:   [x] Time Out/Safety Check  Time:_1025   INTRADIALYTIC MONITORING  (SEE ATTACHED FLOWSHEET)     POST TREATMENT    Time Medication Dose Volume Route    Initials                                           [facility] Signatures Title Initials Time                     Dialyzer cleared: [] Good [x] Fair [] Poor     Blood Processed _68__Liters    Net UF Removed 1500____mL  Post Tx Access:                  AVF/AVG: Bleeding Stop       Art.___min Chaim.____min []+bruit/thrill                Catheter: Locking Solution [x] Heparin 1 ml/1000 units  [] Normal Saline                                                                               Art.1.6 ml Chaim._1.6ml  Post Assessment:              Skin: [x]Warm []Dry []Diaphoretic []Flushed [] Pale []Cyanotic            Lungs: [x]Clear []Coarse []Crackles []Wheezing             Cardiac: [x]Regular []Irregular  []Monitored rhythm____ []N/A            Edema: [x]None []General []Facial []Pedal  []UE []LE []RIGHT []LEFT            Pain: [x]0 []1 []2 []3 []4 []5 []6 []7 []8 []9 []10   POST Tx Note:Patient in room 342 dialyzed for 3 hours. Tolerated tx well with without complaint or complications.   Right TDC functioning without complication accessing or BFR  BFR 400  [id number]  1800 ml UF removed with a net UF removal of 1300 ml. Report given to Del Miller RN with all questions answered. Primary Nurse Report: First initial/Last name/Title    Post Dialysis:ERIC Mccauley RN        Time:_1340_    Abbreviations: AVG-arterial venous graft, AVF-arterial venous fistula, IJ-Internal Jugular,  Subcl-Subclavian, Fem-Femoral, Tx-treatment, AP/HR-apical heart rate, DFR-dialysate flow rate, BFR-blood flow rate, AP-arterial pressure, -venous pressure, UF-ultrafiltrate, TMP-transmembrane pressure, Chaim-Venous, Art-Arterial, RO-Reverse Osmosis

## 2022-06-27 NOTE — PROGRESS NOTES
Problem: Falls - Risk of  Goal: *Absence of Falls  Description: Document Radha Quinn Fall Risk and appropriate interventions in the flowsheet. Outcome: Progressing Towards Goal  Note: Fall Risk Interventions:  Mobility Interventions: Bed/chair exit alarm,Communicate number of staff needed for ambulation/transfer         Medication Interventions: Bed/chair exit alarm    Elimination Interventions: Bed/chair exit alarm,Call light in reach,Toileting schedule/hourly rounds    History of Falls Interventions: Bed/chair exit alarm         Problem: Patient Education: Go to Patient Education Activity  Goal: Patient/Family Education  Outcome: Progressing Towards Goal     Problem: Pressure Injury - Risk of  Goal: *Prevention of pressure injury  Description: Document Tucker Scale and appropriate interventions in the flowsheet.   Outcome: Progressing Towards Goal  Note: Pressure Injury Interventions:  Sensory Interventions: Assess changes in LOC    Moisture Interventions: Absorbent underpads,Apply protective barrier, creams and emollients    Activity Interventions: Pressure redistribution bed/mattress(bed type)    Mobility Interventions: Pressure redistribution bed/mattress (bed type),Assess need for specialty bed    Nutrition Interventions: Document food/fluid/supplement intake,Offer support with meals,snacks and hydration    Friction and Shear Interventions: Apply protective barrier, creams and emollients,Minimize layers                Problem: Patient Education: Go to Patient Education Activity  Goal: Patient/Family Education  Outcome: Progressing Towards Goal     Problem: Airway Clearance - Ineffective  Goal: Achieve or maintain patent airway  Outcome: Progressing Towards Goal     Problem: Gas Exchange - Impaired  Goal: Absence of hypoxia  Outcome: Progressing Towards Goal  Goal: Promote optimal lung function  Outcome: Progressing Towards Goal     Problem: Breathing Pattern - Ineffective  Goal: Ability to achieve and maintain a regular respiratory rate  Outcome: Progressing Towards Goal     Problem: Body Temperature -  Risk of, Imbalanced  Goal: Ability to maintain a body temperature within defined limits  Outcome: Progressing Towards Goal  Goal: Will regain or maintain usual level of consciousness  Outcome: Progressing Towards Goal  Goal: Complications related to the disease process, condition or treatment will be avoided or minimized  Outcome: Progressing Towards Goal     Problem: Isolation Precautions - Risk of Spread of Infection  Goal: Prevent transmission of infectious organism to others  Outcome: Progressing Towards Goal     Problem: Nutrition Deficits  Goal: Optimize nutrtional status  Outcome: Progressing Towards Goal     Problem: Risk for Fluid Volume Deficit  Goal: Maintain normal heart rhythm  Outcome: Progressing Towards Goal  Goal: Maintain absence of muscle cramping  Outcome: Progressing Towards Goal  Goal: Maintain normal serum potassium, sodium, calcium, phosphorus, and pH  Outcome: Progressing Towards Goal     Problem: Loneliness or Risk for Loneliness  Goal: Demonstrate positive use of time alone when socialization is not possible  Outcome: Progressing Towards Goal     Problem: Fatigue  Goal: Verbalize increase energy and improved vitality  Outcome: Progressing Towards Goal     Problem: Patient Education: Go to Patient Education Activity  Goal: Patient/Family Education  Outcome: Progressing Towards Goal     Problem: Chronic Renal Failure  Goal: *Fluid and electrolytes stabilized  Outcome: Progressing Towards Goal     Problem: Patient Education: Go to Patient Education Activity  Goal: Patient/Family Education  Outcome: Progressing Towards Goal     Problem: Risk for Spread of Infection  Goal: Prevent transmission of infectious organism to others  Description: Prevent the transmission of infectious organisms to other patients, staff members, and visitors.   Outcome: Progressing Towards Goal     Problem: Patient Education: Go to Education Activity  Goal: Patient/Family Education  Outcome: Progressing Towards Goal

## 2022-06-27 NOTE — PROGRESS NOTES
D/C plan: home w/ [facility]. Pt will need Medicaid stretcher transport home. Pt goes to dialysis, MWF Chair time is 1310 at [facility] on [address]. [facility]. Pt will need medicaid transport set up for his 1st dialysis treatment s/p d/c as he states that is no longer in place. Pt states he has lost his phone, so he is using a temporary phone and he doesn't know the number. CM to confirm pt's phone number prior to d/c. Care Management Interventions  PCP Verified by CM: Yes (Dr. Sydni Arguelles)  Palliative Care Criteria Met (RRAT>21 & CHF Dx)?: Yes  Palliative Consult Recommended?: Yes  Mode of Transport at Discharge:  Other (see comment) (Medicaid transport. )  Transition of Care Consult (CM Consult): Home Health (Pt is in agreement w/ HH at d/c. )  [facility] - INPATIENT: Yes  Discharge Durable Medical Equipment: No  Physical Therapy Consult: Yes  Occupational Therapy Consult: Yes  Speech Therapy Consult: No  Support Systems: Other Family Member(s) (Sister)  Confirm Follow Up Transport: Other (see comment) (Medicaid transport. )  The Plan for Transition of Care is Related to the Following Treatment Goals : [facility]  The Patient and/or Patient Representative was Provided with a Choice of Provider and Agrees with the Discharge Plan?: Yes (The pt is in agreement w/ the d/c plan. )  Freedom of Choice List was Provided with Basic Dialogue that Supports the Patient's Individualized Plan of Care/Goals, Treatment Preferences and Shares the Quality Data Associated with the Providers?: Yes ([facility])  Discharge Location  Patient Expects to be Discharged to[de-identified] Home with home health

## 2022-06-28 LAB
BACTERIA SPEC CULT: ABNORMAL
CK SERPL-CCNC: 19 U/L (ref 39–308)
GLUCOSE BLD STRIP.AUTO-MCNC: 72 MG/DL (ref 70–110)
GLUCOSE BLD STRIP.AUTO-MCNC: 84 MG/DL (ref 70–110)
GLUCOSE BLD STRIP.AUTO-MCNC: 92 MG/DL (ref 70–110)
GRAM STN SPEC: ABNORMAL
GRAM STN SPEC: ABNORMAL
SERVICE CMNT-IMP: ABNORMAL

## 2022-06-28 PROCEDURE — 74011250637 HC RX REV CODE- 250/637: Performed by: STUDENT IN AN ORGANIZED HEALTH CARE EDUCATION/TRAINING PROGRAM

## 2022-06-28 PROCEDURE — 82550 ASSAY OF CK (CPK): CPT

## 2022-06-28 PROCEDURE — 74011250636 HC RX REV CODE- 250/636: Performed by: HOSPITALIST

## 2022-06-28 PROCEDURE — 82962 GLUCOSE BLOOD TEST: CPT

## 2022-06-28 PROCEDURE — 74011636637 HC RX REV CODE- 636/637: Performed by: HOSPITALIST

## 2022-06-28 PROCEDURE — 74011250637 HC RX REV CODE- 250/637: Performed by: HOSPITALIST

## 2022-06-28 PROCEDURE — 74011250637 HC RX REV CODE- 250/637: Performed by: INTERNAL MEDICINE

## 2022-06-28 PROCEDURE — 65270000046 HC RM TELEMETRY

## 2022-06-28 RX ADMIN — ASPIRIN 81 MG: 81 TABLET, COATED ORAL at 08:25

## 2022-06-28 RX ADMIN — ISOSORBIDE MONONITRATE 60 MG: 60 TABLET, EXTENDED RELEASE ORAL at 08:25

## 2022-06-28 RX ADMIN — Medication 7 UNITS: at 22:00

## 2022-06-28 RX ADMIN — PANTOPRAZOLE SODIUM 40 MG: 40 TABLET, DELAYED RELEASE ORAL at 08:25

## 2022-06-28 RX ADMIN — Medication 500 MG: at 20:46

## 2022-06-28 RX ADMIN — DOXAZOSIN 2 MG: 1 TABLET ORAL at 08:25

## 2022-06-28 RX ADMIN — HEPARIN SODIUM 5000 UNITS: 5000 INJECTION INTRAVENOUS; SUBCUTANEOUS at 12:44

## 2022-06-28 RX ADMIN — FUROSEMIDE 80 MG: 40 TABLET ORAL at 20:46

## 2022-06-28 RX ADMIN — CARVEDILOL 12.5 MG: 12.5 TABLET, FILM COATED ORAL at 19:00

## 2022-06-28 RX ADMIN — SEVELAMER CARBONATE 1200 MG: 2400 POWDER, FOR SUSPENSION ORAL at 08:26

## 2022-06-28 RX ADMIN — CARVEDILOL 12.5 MG: 12.5 TABLET, FILM COATED ORAL at 08:25

## 2022-06-28 RX ADMIN — CYANOCOBALAMIN TAB 1000 MCG 1000 MCG: 1000 TAB at 08:25

## 2022-06-28 RX ADMIN — FUROSEMIDE 80 MG: 40 TABLET ORAL at 08:25

## 2022-06-28 RX ADMIN — Medication 500 MG: at 08:25

## 2022-06-28 RX ADMIN — SEVELAMER CARBONATE 1200 MG: 2400 POWDER, FOR SUSPENSION ORAL at 18:57

## 2022-06-28 RX ADMIN — HEPARIN SODIUM 5000 UNITS: 5000 INJECTION INTRAVENOUS; SUBCUTANEOUS at 19:00

## 2022-06-28 NOTE — PROGRESS NOTES
Assessment:     dialysis today , pt is non compliant, cut dialysis short, does not show up for dialysis or comes to HD but then goes to bathroom and spend good time there, then leaves dialysis without  HD. Poor prognosis and high mortality. Recent blood cx is negative from 6/24 and 6/25  Appreciate vascular surgery assistanc  Appreciate ID assistance, antibiotics per ID    Intake and output   Dose all  meds for ESRD   Renal diet, salt and potassium restriction      BMD- has secondary Hyperparathyroidism  Continue  calcitriol 1 mcg MWF  sevelamer for phos binders         Uncontrolled Hypertension: BP is at target range now  Resume coreg 12.5 mg BID  On doxazosine, Imdur and lasix        Anemia of CKD- Retacrit during HD         CC:ESRF  Interval History: stable post HD        Subjective:   PT does not have any somatic complaints         Review of Systems  Negative for Edema, SOB, Chest pain, Tremors, Nausea, vomiting        Blood pressure (!) 169/65, pulse 85, temperature 98 °F (36.7 °C), resp. rate 18, height 5' 7\" (1.702 m), weight 74.1 kg (163 lb 4.8 oz), SpO2 97 %. awake and alert and NAD  Abdomen is soft  No edema  No new rash    I have reviewed the following and here are my interpretation  )IR DECLOT AV GRAFT PERCUTANEOUS    Result Date: 6/6/2022  See impression. Fluoroscopy was provided for this procedure under the supervision and/or direction of the attending provider. ? For further information regarding this procedure, see patient medical record. MRI BRAIN WO CONT    Result Date: 6/1/2022  Brain MR without contrast: Indication: Mental status change. Decreased level of consciousness. Hypoglycemia. Procedure: Sagittal spin echo T1, axial FSE FLAIR and T2 and axial diffusion weighted scanning was performed. An axial T2* scan optimized to detect hemosiderin and calcium was performed. Coronal spin echo T1and FSE T2 scans were also performed. No contrast was administered.  Comparison exam: Head CT 6/1/2022. Brain MRI 8/31/2021. Findings: Diffusion: There are no areas of restricted diffusion, no acute infarction. The T2* scan shows no acute or chronic hemorrhage or abnormal calcifications. Brain parenchyma: Numerous bilateral right greater than left cerebellar hemisphere chronic areas of infarction are noted, unchanged from previous MRI. There are several chronic areas of infarction that are linear on the right and a single lesion on the left. There is a right paracentral upper pontine chronic lacunar small vessel infarction that is new from the previous MRI, centrally fluid signal with some peripheral high signal FLAIR. Very low level ischemic changes parieto-occipital periventricular and deep white matter. No new focal supratentorial lesion, mass or mass effect. Ventricles and sulci: Top normal lateral ventricles particularly frontal horns. Mild prominence of sulci in the perisylvian region. No change from previous. Extra axial: No extra axial fluid collection or mass. Brain vasculature: Normal, no arterial vascular abnormality is noted. Craniocervical Junction: Normal. Extracranial and skull base:  Severe proptosis by imaging criteria likely from lipomatosis and shallow orbits, unchanged from previous. 1. No acute hemorrhage or acute infarction. 2. Numerous bilateral chronic right greater than left cerebellar hemisphere infarctions unchanged from previous. 3. Chronic right paramedian upper pontine lacunar small vessel infarction, new from previous MRI. 4. Mild atrophy and ischemic white matter changes. CT HEAD WO CONT    Result Date: 6/1/2022  EXAM: CT head INDICATION: Confusion. COMPARISON: 8/29/2021 TECHNIQUE: Axial CT imaging of the head was performed without intravenous contrast. Standard multiplanar coronal and sagittal reformatted images were obtained and are included in interpretation.  One or more dose reduction techniques were used on this CT: automated exposure control, adjustment of the mAs and/or kVp according to patient size, and iterative reconstruction techniques. The specific techniques used on this CT exam have been documented in the patient's electronic medical record. Digital Imaging and Communications in Medicine (DICOM) format image data are available to nonaffiliated external healthcare facilities or entities on a secure, media free, reciprocally searchable basis with patient authorization for at least a 12-month period after this study. _______________ FINDINGS: BRAIN AND POSTERIOR FOSSA: Right central arden hypodensity, new from prior study. No evidence of acute large vessel transcortical infarct or acute parenchymal hemorrhage. No midline shift or hydrocephalus. EXTRA-AXIAL SPACES AND MENINGES: There are no abnormal extra-axial fluid collections. CALVARIUM: Intact. SINUSES: Clear. OTHER: None. _______________     Right central arden hypodensity, new from prior study, age indeterminate infarct. Correlate clinically. CT ABD PELV WO CONT    Result Date: 5/16/2022  EXAM: CT of the abdomen and pelvis CLINICAL INDICATION/HISTORY: LLQ abd pain, constipation and anal pain   > Additional: None. COMPARISON: 09/01/2021   > Reference Exam: None. TECHNIQUE: Axial CT imaging of the abdomen and pelvis was performed without intravenous contrast. Oral contrast not administered. Multiplanar reformats were generated. Dose reduction techniques used: automated exposure control, adjustment of the mAs and/or kVp according to patient size, and iterative reconstruction techniques. Digital Imaging and Communications in Medicine (DICOM) format image data are available to nonaffiliated external healthcare facilities or entities on a secure, media free, reciprocally searchable basis with patient authorization for at least a 12-month period after this study. _______________ FINDINGS: LOWER CHEST: Lung bases are clear. Heart is mildly enlarged without pericardial effusion. LIVER, BILIARY: Liver is unremarkable. No biliary dilation. Gallbladder is markedly contracted but otherwise unremarkable. PANCREAS: Unremarkable. SPLEEN: Unremarkable. ADRENALS: Unremarkable. KIDNEYS/URETERS/BLADDER: No hydronephrosis. No calculi. LYMPH NODES: No enlarged lymph nodes. GASTROINTESTINAL TRACT: No bowel dilation or wall thickening. Colonic diverticulosis. PELVIC ORGANS: Unremarkable. VASCULATURE: Moderate to marked atherosclerosis. BONES: No acute or aggressive osseous abnormalities identified. OTHER: No ascites. _______________     1. No acute intra-abdominal abnormality. Colonic diverticulosis without acute diverticulitis. * **  *    XR CHEST PORT    Result Date: 6/20/2022  EXAM: XR CHEST PORT CLINICAL INDICATION/HISTORY: meets SIRS criteria -Additional: None COMPARISON: 6/2/2022 TECHNIQUE: Frontal view of the chest _______________ FINDINGS: HEART AND MEDIASTINUM: Right IJV vascular catheter tip at the SVC. Cardiomegaly. LUNGS AND PLEURAL SPACES: Interstitial/parenchymal opacities. No large effusion or pneumothorax. BONY THORAX AND SOFT TISSUES: No acute osseous abnormality _______________     Cardiomegaly with mild pulmonary edema. XR CHEST PORT    Result Date: 6/2/2022  EXAM: XR CHEST PORT CLINICAL INDICATION/HISTORY: CHF -Additional: None COMPARISON: One day prior TECHNIQUE: Portable frontal view of the chest _______________ FINDINGS: SUPPORT DEVICES: Right IJV catheter with tip at the cavoatrial junction. HEART AND MEDIASTINUM: Cardiomegaly. LUNGS AND PLEURAL SPACES: Bilateral parenchymal/interstitial haziness. No large effusion or pneumothorax. _______________     Cardiomegaly with pulmonary edema. XR CHEST PORT    Result Date: 6/1/2022  EXAM: XR CHEST PORT CLINICAL INDICATION/HISTORY: Encephalopathy -Additional: None COMPARISON: 1/16/2022 TECHNIQUE: Portable frontal view of the chest _______________ FINDINGS: SUPPORT DEVICES: Interval placement of a right IJV vessels catheter with tip at the cavoatrial junction.  HEART AND MEDIASTINUM: Cardiomegaly. LUNGS AND PLEURAL SPACES: Bilateral parenchymal/interstitial haziness. No large effusion or pneumothorax. _______________     Cardiomegaly with pulmonary edema. IR INSERT NON TUNL CVC OVER 5 YRS    Result Date: 6/1/2022  PROCEDURE: NON-TUNNELED DIALYSIS CATHETER PLACEMENT ATTENDING: Kajal Toribio M.D. CONTRAST: None COMPLICATIONS: None MEDICATIONS: Local lidocaine. INDICATION: Renal failure requiring hemodialysis. PROCEDURE: Informed consent was obtained where the risks, benefits and alternatives to the procedure were explained. All elements of maximal sterile barrier technique followed including: cap and mask and sterile gown and sterile gloves and a large sterile sheet and hand hygiene and 2% chlorhexidine for cutaneous antisepsis (or acceptable alternative antiseptics, per current guidelines). Sterile ultrasound gel and a sterile cover for the US probe was used. After successfully identifying a patent right internal jugular vein, real-time ultrasound guidance demonstrated patent right internal jugular vein with normal waveforms. Then, real-time ultrasound guidance was used to puncture the right internal jugular vein. Permanent recording was created for the patient's record. Using this access, a 12 Fr dialysis catheter was placed under fluoroscopic guidance with its tip at the caval atrial junction. The catheter was secured to the skin with sutures, the ports heparinized, and a sterile dressing applied to the area. The catheter may be used immediately. The patient tolerated the procedure well and was transferred from the IR suite in stable condition. Fluoroscopic dose (reference air kerma): 9 mGy     Non-tunneled dialysis catheter placement performed, as discussed above. ECHO ADULT COMPLETE    Result Date: 6/22/2022    Left Ventricle: Normal left ventricular systolic function with a visually estimated EF of 55 - 60%.  Left ventricle size is normal. Moderately increased wall thickness. Normal wall motion. Abnormal diastolic function.   Aortic Valve: Tricuspid valve. Mildly calcified cusp.   Left Atrium: Left atrium is mildly dilated.   Pericardium: Small (<1 cm) pericardial effusion present. No indication of cardiac tamponade.   Unable to assess RVSP due to inadequate or insignificant tricuspid regurgitation.   Mitral annular calcification and calcification of the posterior mitral leaflet. There is a mobile probably calcified nodule on the posterior leaflet of the mitral valve. Vegetation cannot be ruled out on this transthoracic study. Consider BRENDEN for further evaluation. ECHO ADULT COMPLETE    Result Date: 6/2/2022    Left Ventricle: Normal left ventricular systolic function with a visually estimated EF of 60 - 65%. Left ventricle size is normal. Moderately increased wall thickness. Findings consistent with moderate concentric hypertrophy. Normal wall motion. Grade I diastolic dysfunction with normal LAP.   Left Atrium: Left atrium is mildly dilated.   Mitral Valve: Moderate annular calcification at the posterior leaflet of the mitral valve. Trace regurgitation. No stenosis noted.   Tricuspid Valve : Inadequate tricuspid regurgitant jejt to comment on estimated PASP.   Interatrial Septum: No interatrial shunt visualized with color Doppler. Grade 0 Absence of bubbles. Agitated saline study was negative with and without provocation. DUPLEX CAROTID BILATERAL    Result Date: 6/2/2022  Bilateral less than 50% stenosis of the internal carotid arteries. No significant stenosis in the external carotid arteries bilaterally. Antegrade flow in both vertebral arteries.  Normal flow in both subclavian arteries    DUPLEX HEMODIALYSIS ACCESS LEFT    Result Date: 6/23/2022  · Adjacent to mid portion of the occluded graft complex cystic mass noted measuring 1.8 trv x 1.4 ap cm      DUPLEX UPPER EXT BYPASS GRAFT LEFT    Result Date: 6/7/2022  Occluded arteriovenous graft in left upper extremity. Intake/Output Summary (Last 24 hours) at 6/28/2022 1112  Last data filed at 6/27/2022 1330  Gross per 24 hour   Intake --   Output 1300 ml   Net -1300 ml      No results for input(s): WBC in the last 72 hours.     No lab exists for component: HEMOGLOBIN, PLATELET  Lab Results   Component Value Date/Time    Sodium 134 (L) 06/26/2022 07:35 AM    Potassium 4.3 06/26/2022 07:35 AM    Chloride 101 06/26/2022 07:35 AM    CO2 24 06/26/2022 07:35 AM    Anion gap 9 06/26/2022 07:35 AM    Glucose 81 06/26/2022 07:35 AM    BUN 65 (H) 06/26/2022 07:35 AM    Creatinine 12.00 (H) 06/26/2022 07:35 AM    BUN/Creatinine ratio 5 (L) 06/26/2022 07:35 AM    GFR est AA 5 (L) 06/26/2022 07:35 AM    GFR est non-AA 4 (L) 06/26/2022 07:35 AM    Calcium 7.7 (L) 06/26/2022 07:35 AM      Allergies   Allergen Reactions    Penicillins Rash    Vancomycin Other (comments)     kayla syndrome     Current Facility-Administered Medications   Medication Dose Route Frequency Provider Last Rate Last Admin    ceFAZolin (ANCEF) 2 g/20 mL in sterile water IV syringe  2 g IntraVENous Once per day on Mon Wed Emily Blackwell MD   2 g at 06/27/22 2209    [START ON 7/1/2022] ceFAZolin (ANCEF) 3 g/30 mL in sterile water IV syringe  3 g IntraVENous every Friday Nicole Blackwell MD        Saccharomyces boulardii (FLORASTOR) capsule 500 mg  500 mg Oral BID Emily Blackwell MD   500 mg at 06/28/22 0825    diphenhydrAMINE (BENADRYL) injection 25-50 mg  25-50 mg IntraVENous Rad Kaylee Avila MD   50 mg at 06/26/22 1356    fentaNYL citrate (PF) injection  mcg   mcg IntraVENous Rad Kaylee Avila MD   50 mcg at 06/26/22 1406    naloxone (NARCAN) injection 0.4 mg  0.4 mg IntraVENous PRN Akira Avila MD        HYDROmorphone (DILAUDID) injection 0.5 mg  0.5 mg IntraVENous Q3H PRN Wen Fuentes PA-C   0.5 mg at 06/26/22 0856    oxyCODONE-acetaminophen (PERCOCET) 5-325 mg per tablet 1-2 Tablet  1-2 Tablet Oral Q6H PRN Carol Goodman PA-C   2 Tablet at 06/25/22 2549    insulin glargine (LANTUS) injection 7 Units  7 Units SubCUTAneous QHS Vincenzo MEJIAS MD   7 Units at 06/27/22 2200    epoetin delonte-epbx (RETACRIT) injection 20,000 Units  20,000 Units SubCUTAneous Q MON, WED & José Pacheco MD   20,000 Units at 06/27/22 2238    sevelamer carbonate (RENVELA) oral powder 1,200 mg  1,200 mg Oral TID WITH MEALS José Alston MD   1,200 mg at 06/28/22 0826    ondansetron (ZOFRAN) injection 4 mg  4 mg IntraVENous Q8H PRN Meredith Lizarraga MD        acetaminophen (TYLENOL) tablet 650 mg  650 mg Oral Q6H PRN Neel Watson MD   650 mg at 06/22/22 1417    Or    acetaminophen (TYLENOL) suppository 650 mg  650 mg Rectal Q6H PRN Neel Watson MD        guaiFENesin-dextromethorphan (ROBITUSSIN DM) 100-10 mg/5 mL syrup 5 mL  5 mL Oral Q4H PRN Neel Watson MD        sodium chloride (NS) flush 5-10 mL  5-10 mL IntraVENous PRN Renny Licea MD   10 mL at 06/22/22 0650    carvediloL (COREG) tablet 12.5 mg  12.5 mg Oral BID WITH MEALS José Alston MD   12.5 mg at 06/28/22 0825    calcitRIOL (ROCALTROL) capsule 0.25 mcg  0.25 mcg Oral Q MON, WED & José Pacheco MD   0.25 mcg at 06/27/22 2209    aspirin delayed-release tablet 81 mg  81 mg Oral DAILY Meredith Lizarraga MD   81 mg at 06/28/22 0825    cyanocobalamin tablet 1,000 mcg  1,000 mcg Oral DAILY Vincenzo MEJIAS MD   1,000 mcg at 06/28/22 0825    doxazosin (CARDURA) tablet 2 mg  2 mg Oral DAILY Vincenzo MEJIAS MD   2 mg at 06/28/22 0825    furosemide (LASIX) tablet 80 mg  80 mg Oral BID Vincenzo MEJIAS MD   80 mg at 06/28/22 0825    isosorbide mononitrate ER (IMDUR) tablet 60 mg  60 mg Oral DAILY Vincenzo MEJIAS MD   60 mg at 06/28/22 0825    pantoprazole (PROTONIX) tablet 40 mg  40 mg Oral ACB Vincenzo MEJIAS MD   40 mg at 06/28/22 0825    glucose chewable tablet 16 g  16 g Oral PRN Meredith Lizarraga MD  glucagon (GLUCAGEN) injection 1 mg  1 mg IntraMUSCular PRN Adan Pedro MD        insulin lispro (HUMALOG) injection   SubCUTAneous AC&HS Robert MEJIAS MD   4 Units at 06/23/22 1142    dextrose 10% infusion 0-250 mL  0-250 mL IntraVENous PRN Adan Pedro MD        hydrALAZINE (APRESOLINE) 20 mg/mL injection 20 mg  20 mg IntraVENous Q6H PRN Adan Pedro MD   20 mg at 06/26/22 0037    heparin (porcine) injection 5,000 Units  5,000 Units SubCUTAneous Q8H Robert MEJIAS MD   5,000 Units at 06/27/22 1834    heparin (porcine) 1,000 unit/mL injection 3,600 Units  3,600 Units Hemodialysis DIALYSIS PRN Ankush Bermudez MD   3,600 Units at 06/27/22 1345

## 2022-06-28 NOTE — PROGRESS NOTES
Hospitalist Progress Note    Patient: Chitra Rodrigues MRN: [id number]  CSN: [id number]    YOB: 1970  Age: 50 y.o. Sex: male    DOA: 6/20/2022 LOS:  LOS: 8 days                Assessment/Plan     Patient Active Problem List   Diagnosis Code    Iron deficiency anemia D50.9    Other restrictive cardiomyopathy ([facility] [address] I42.5    ESRD (end stage renal disease) on dialysis ([facility] N18.6, Z99.2    HTN (hypertension) I10    Hx of BKA, right ([facility] [address] Z89.511    Uremia N19    Non-compliance with renal dialysis ([facility] [address] [facility] Z91.15    Hypertension I10    COVID-19 U07.1    Pulmonary edema J81.1    Volume overload E87.70    Sepsis ([facility] A41.9    Bacteremia R78.81    [facility] T82. 7XXA        Chief complaint :  Fever, cough, congestion  54 y.o. male with end-stage renal disease, diabetes, CHF, hypertension, s/p right BKA presents to ER with concerns of not feeling well, fever congestion cough and shortness of breath. Feeling better    Sepsis -  POA  Secondary to bacteremia  Source TDC vs AVG    Persistent Bacteremia -  Blood cultures growing MSSA  On cefazolin. ID following. LUE non functioning AVG   source of infection  Vascular consulted  S/p excision of infected AV graft left upper arm. S/p TDC placement. Volume overload/pulmonary edema-  Resolved with Fluid removal with HD     ESRD-  Missed dialysis  Nephrology following  HD per nephrology     Hypertension-  BP better controlled  Continue with home medications.     COVID 19-  Not hypoxic, monitor     DM-  Started on sliding scale insulin, ADA diet.     Right BKA     DVT prophylaxis with heparin       Disposition : TBD    Review of systems  General: No fevers or chills. Cardiovascular: No chest pain or pressure. No palpitations. Pulmonary: No shortness of breath. Gastrointestinal: No nausea, vomiting. Physical Exam:  General: Awake, cooperative, no acute distress    HEENT: NC, Atraumatic.   PERRLA, anicteric sclerae. Lungs: CTA Bilaterally. No Wheezing/Rhonchi/Rales. Heart:  S1 S2,  No murmur, No Rubs, No Gallops  Abdomen: Soft, Non distended, Non tender.  +Bowel sounds,   Extremities: Right BKA, tender LUE AVG site  Psych:   Not anxious or agitated. Neurologic:  No acute neurological deficit. Vital signs/Intake and Output:  Visit Vitals  BP (!) 169/65   Pulse 85   Temp 98 °F (36.7 °C)   Resp 18   Ht 5' 7\" (1.702 m)   Wt 74.1 kg (163 lb 4.8 oz)   SpO2 97%   BMI 25.58 kg/m²     Current Shift:  No intake/output data recorded. Last three shifts:  06/26 1901 - 06/28 0700  In: -   Out: 1300             Labs: Results:       Chemistry Recent Labs     06/26/22  0735   GLU 81   *   K 4.3      CO2 24   BUN 65*   CREA 12.00*   CA 7.7*   AGAP 9   BUCR 5*      CBC w/Diff No results for input(s): WBC, RBC, HGB, HCT, PLT, GRANS, LYMPH, EOS, HGBEXT, HCTEXT, PLTEXT, HGBEXT, HCTEXT, PLTEXT in the last 72 hours. Cardiac Enzymes No results for input(s): CPK, CKND1, WILLIE in the last 72 hours. No lab exists for component: CKRMB, TROIP   Coagulation No results for input(s): PTP, INR, APTT, INREXT, INREXT in the last 72 hours. Lipid Panel Lab Results   Component Value Date/Time    Cholesterol, total 115 06/02/2022 09:45 AM    HDL Cholesterol 62 (H) 06/02/2022 09:45 AM    LDL, calculated 46 06/02/2022 09:45 AM    VLDL, calculated 7 06/02/2022 09:45 AM    Triglyceride 35 06/02/2022 09:45 AM    CHOL/HDL Ratio 1.9 06/02/2022 09:45 AM      BNP No results for input(s): BNPP in the last 72 hours. Liver Enzymes No results for input(s): TP, ALB, TBIL, AP in the last 72 hours.     No lab exists for component: SGOT, GPT, DBIL   Thyroid Studies Lab Results   Component Value Date/Time    TSH 2.38 08/29/2021 01:20 AM        Procedures/imaging: see electronic medical records for all procedures/Xrays and details which were not copied into this note but were reviewed prior to creation of Plan

## 2022-06-28 NOTE — PROGRESS NOTES
[facility]  (A Division of [address] [place] [address])                                                                                                                      Lefty Tee MD  Office #: - Option # [phone]  Fax #: [phone]     Date of Admission: 6/20/2022Date of Note: 6/28/2022  Reason for Referral: Evaluation and antibiotic management of bacteremia/COVID 10 Infection       Current Antimicrobials:    Prior Antimicrobials:    Cefazolin 6/26 to date   Cefepime 6/20 to 6/21  daptomycin  6/20 to 6/26       Assessment- ID related:  --------------------------------------------------------------------------    · 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 Proximal vein part well incorporated and overswen  Right temp HD cath placed  · COVID 19 Infection-- doubt viral PNA  --fully vaccinared and boosted 11/2021  --not clear when viral infection was acquired  · Pul edema Vs PNA-- had few missed HD sessions  · Occluded L AVG  · History of C.diff in the past    Other Medical Issues- Mx per respective team:  ESRD on HD  DM  R BKA  Hx of AMS with severe Hypoglycemia== recent admission      Recommendation for ID issues I am following:  ------------------------------------------------------------------------------  Wound care for     --[address] from 6/24, 6/25 and 6/27: NGSF  --R TDC placed 6/26    Cefazolin 2/2/3 gm post HD MWF    Plan for 6 weeks from 6/24/22  -- survelliance culture post rx - weekly X3    Removal of R femoral TLC soon    Cont probiotics.  High risk for CDI-- will monitor BM and promptly start treatment if frequent BM with increasing WBC    Guarded prognosis           Subjective:  Pt seen, had low BP earlier today-- no F/C  No cough/SOB    Review of systems:    No F/C/R  No N/V or abd pain or distension  No cough/sob/chest pain  No joint swelling/pain  No itching or rash       HPI:  Dang Rueda is a 50 y.o. BLACK/ with PMH of ESRD on HD, DM. Recently admitted to [facility] with AMS and hypoglycemia. During his stay, he had underwent vascular study for occlusion on left arm, and was not able to use it, and R TDC was placed. He had missed couple of his HD sessions and he was feeling body aches and SOB and had cough with clear mucus. He came in to ED, felt he could have PNA. Other associated symptoms include N/V and loose BM without   Had high BP to 208/101, fever to 102.1 after admission. .  Currently on Vanco and Cefepime. Reports occasional pain on L arm. No abd pain. Admission CXR with cardiomegaly w/mild pul edema. WBC at 12K, no bands           Active Hospital Problems    Diagnosis Date Noted    Sepsis ([address] 06/22/2022    Bacteremia 06/22/2022    Arteriovenous graft infection ([address] 06/22/2022    COVID-19 06/20/2022    Pulmonary edema 06/20/2022    Volume overload 06/20/2022    ESRD (end stage renal disease) on dialysis ([address] 01/29/2020     Past Medical History:   Diagnosis Date    Chronic kidney disease     Diabetes ([address]     Heart failure ([address]     [facility]     Hypertension     Sickle cell trait ([facility]      Past Surgical History:   Procedure Laterality Date    HX ORTHOPAEDIC      right BKA 2017    IR DECLOT AV GRAFT PERCUTANEOUS  6/3/2022    IR INSERT NON TUNL CVC OVER 5 YRS  6/1/2022     History reviewed. No pertinent family history.   Social History     Socioeconomic History    Marital status: LEGALLY      Spouse name: Not on file    Number of children: Not on file    Years of education: Not on file    Highest education level: Not on file   Occupational History    Not on file   Tobacco Use    Smoking status: Former Smoker    Smokeless tobacco: Never Used   Substance and Sexual Activity    Alcohol use: No    Drug use: No    Sexual activity: Not on file   Other Topics Concern    Not on file   Social History Narrative    Not on file     Social Determinants of Health     Financial Resource Strain:     Difficulty of Paying Living Expenses: Not on file   Food Insecurity:     Worried About Running Out of Food in the Last Year: Not on file    Danii of Food in the Last Year: Not on file   Transportation Needs:     Lack of Transportation (Medical): Not on file    Lack of Transportation (Non-Medical):  Not on file   Physical Activity:     Days of Exercise per Week: Not on file    Minutes of Exercise per Session: Not on file   Stress:     Feeling of Stress : Not on file   Social Connections:     Frequency of Communication with Friends and Family: Not on file    Frequency of Social Gatherings with Friends and Family: Not on file    Attends Buddhism Services: Not on file    Active Member of [address] [place] [address] [employer] or Organizations: Not on file    Attends Club or Organization Meetings: Not on file    Marital Status: Not on file   Intimate Partner Violence:     Fear of Current or Ex-Partner: Not on file    Emotionally Abused: Not on file    Physically Abused: Not on file    Sexually Abused: Not on file   Housing Stability:     Unable to Pay for Housing in the Last Year: Not on file    Number of [place] in the Last Year: Not on file    Unstable Housing in the Last Year: Not on file       Allergies:  Penicillins and Vancomycin     Medications:  Current Facility-Administered Medications   Medication Dose Route Frequency    ceFAZolin (ANCEF) 2 g/20 mL in sterile water IV syringe  2 g IntraVENous Once per day on Mon Wed    [START ON 7/1/2022] ceFAZolin (ANCEF) 3 g/30 mL in sterile water IV syringe  3 g IntraVENous every Friday    Saccharomyces boulardii (FLORASTOR) capsule 500 mg  500 mg Oral BID    diphenhydrAMINE (BENADRYL) injection 25-50 mg  25-50 mg IntraVENous Rad Multiple    fentaNYL citrate (PF) injection  mcg   mcg IntraVENous Rad Multiple    naloxone (NARCAN) injection 0.4 mg  0.4 mg IntraVENous PRN    HYDROmorphone (DILAUDID) injection 0.5 mg  0.5 mg IntraVENous Q3H PRN    oxyCODONE-acetaminophen (PERCOCET) 5-325 mg per tablet 1-2 Tablet  1-2 Tablet Oral Q6H PRN    insulin glargine (LANTUS) injection 7 Units  7 Units SubCUTAneous QHS    epoetin delonte-epbx (RETACRIT) injection 20,000 Units  20,000 Units SubCUTAneous Q MON, WED & FRI    sevelamer carbonate (RENVELA) oral powder 1,200 mg  1,200 mg Oral TID WITH MEALS    ondansetron (ZOFRAN) injection 4 mg  4 mg IntraVENous Q8H PRN    acetaminophen (TYLENOL) tablet 650 mg  650 mg Oral Q6H PRN    Or    acetaminophen (TYLENOL) suppository 650 mg  650 mg Rectal Q6H PRN    guaiFENesin-dextromethorphan (ROBITUSSIN DM) 100-10 mg/5 mL syrup 5 mL  5 mL Oral Q4H PRN    sodium chloride (NS) flush 5-10 mL  5-10 mL IntraVENous PRN    carvediloL (COREG) tablet 12.5 mg  12.5 mg Oral BID WITH MEALS    calcitRIOL (ROCALTROL) capsule 0.25 mcg  0.25 mcg Oral Q MON, WED & FRI    aspirin delayed-release tablet 81 mg  81 mg Oral DAILY    cyanocobalamin tablet 1,000 mcg  1,000 mcg Oral DAILY    doxazosin (CARDURA) tablet 2 mg  2 mg Oral DAILY    furosemide (LASIX) tablet 80 mg  80 mg Oral BID    isosorbide mononitrate ER (IMDUR) tablet 60 mg  60 mg Oral DAILY    pantoprazole (PROTONIX) tablet 40 mg  40 mg Oral ACB    glucose chewable tablet 16 g  16 g Oral PRN    glucagon (GLUCAGEN) injection 1 mg  1 mg IntraMUSCular PRN    insulin lispro (HUMALOG) injection   SubCUTAneous AC&HS    dextrose 10% infusion 0-250 mL  0-250 mL IntraVENous PRN    hydrALAZINE (APRESOLINE) 20 mg/mL injection 20 mg  20 mg IntraVENous Q6H PRN    heparin (porcine) injection 5,000 Units  5,000 Units SubCUTAneous Q8H    heparin (porcine) 1,000 unit/mL injection 3,600 Units  3,600 Units Hemodialysis DIALYSIS PRN      Physical Exam:    Temp (24hrs), Av °F (36.7 °C), Min:97.6 °F (36.4 °C), Max:98.4 °F (36.9 °C)    Visit Vitals  BP (!) 167/71   Pulse 85   Temp 97.6 °F (36.4 °C)   Resp 18   Ht 5' 7\" (1.702 m)   Wt 74.1 kg (163 lb 4.8 oz)   SpO2 96%   BMI 25.58 kg/m²     R chest  TDC in place--removed 6/22  New TDC placed 6/26  R groin-- HD cath in place, dressing over the line     GEN: WD Chronically ill looking - on 2L NC  not in resp distress. HEENT: Unicteric. EOMI intact  No neck swelling  CHEST: Non laboured breathing. CTA  CVS:RRR, no mur/gallop  ABD: Obese/soft. Non tender. Non distended  YOVANY: Deferred  EXT: No apparent swelling or redness on UE/LE joints except some drainage on lower border of AVG graft that opened up  R BKA  Skin: Dry and intact. No rash, no redness. CNS: A, OX3. Moves all extremity. CN grossly ok.       Microbiology  All Micro Results     Procedure Component Value Units Date/Time    CULTURE, Darral Pears STAIN [[id number]]  (Abnormal)  (Susceptibility) Collected: 06/24/22 1930    Order Status: Completed Specimen: Wound from Arm Updated: 06/28/22 0913     Special Requests: NO SPECIAL REQUESTS        GRAM STAIN RARE WBCS SEEN         NO ORGANISMS SEEN        Culture result:       SCANT STAPHYLOCOCCUS AUREUS          CULTURE, BLOOD [[id number]] Collected: 06/26/22 0726    Order Status: Completed Specimen: Blood Updated: 06/28/22 0715     Special Requests: NO SPECIAL REQUESTS        Culture result: NO GROWTH 2 DAYS       CULTURE, BLOOD [[id number]] Collected: 06/25/22 1215    Order Status: Completed Specimen: Blood Updated: 06/28/22 0715     Special Requests: NO SPECIAL REQUESTS        Culture result: NO GROWTH 3 DAYS       CULTURE, BLOOD [[id number]] Collected: 06/24/22 0100    Order Status: Completed Specimen: Blood Updated: 06/28/22 0715     Special Requests: NO SPECIAL REQUESTS        Culture result: NO GROWTH 4 DAYS       CULTURE, WOUND Dannial Hane STAIN [[id number]]  (Abnormal)  (Susceptibility) Collected: 06/22/22 1505    Order Status: Completed Specimen: Catheter Tip Updated: 06/25/22 1402 Special Requests: NO SPECIAL REQUESTS        Culture result:       COLONY COUNT =>100 COLONIES STAPHYLOCOCCUS AUREUS          CULTURE, ANAEROBIC [[id number]] Collected: 06/24/22 1930    Order Status: Canceled Specimen: Surgical Specimen     CULTURE, Iván Mike STAIN [[id number]]  (Abnormal)  (Susceptibility) Collected: 06/22/22 0915    Order Status: Completed Specimen: Wound from Abscess Updated: 06/24/22 0856     Special Requests: NO SPECIAL REQUESTS        GRAM STAIN OCCASIONAL WBCS SEEN               RARE GRAM POSITIVE COCCI IN PAIRS           Culture result:       HEAVY STAPHYLOCOCCUS AUREUS          C. DIFFICILE AG & TOXIN A/B [[id number]]  (Abnormal) Collected: 06/22/22 1945    Order Status: Completed Specimen: Stool from Miscellaneous sample Updated: 06/23/22 1410     PCR Reflex       See Reflex order for C. difficile (DNA)           INTERPRETATION       Indeterminate for Toxigenic C. difficile, DNA confirmation to follow. Janicealeksandra Livingston DIFF MOLECULAR [[id number]] Collected: 06/22/22 1945    Order Status: Completed Specimen: Miscellaneous sample Updated: 06/23/22 1410     C Diff Molecular Negative        Comment: This specimen is negative for toxigenic C difficile by DNA amplification. Repeat testing is not recommended for confirmation, samples received within 7 days of this negative result will be rejected.        CULTURE, BLOOD [[id number]]  (Abnormal) Collected: 06/21/22 0244    Order Status: Completed Specimen: Blood Updated: 06/23/22 1336     Special Requests: NO SPECIAL REQUESTS        GRAM STAIN       GRAM POSITIVE COCCI IN CLUSTERS ANAEROBIC BOTTLE                  SMEAR CALLED TO AND CORRECTLY REPEATED BY: Sophia Lazaro RN 3N, TO JAF AT 1847 6/21/2022           Culture result:       STAPHYLOCOCCUS AUREUS GROWING IN BOTH BOTTLES DRAWN No Site Indicated REFER TO [id number] FOR SENSITIVITIES          CULTURE, BLOOD [[id number]]  (Abnormal) Collected: 06/21/22 0253    Order Status: Completed Specimen: Blood Updated: 06/23/22 1335     Special Requests: NO SPECIAL REQUESTS        GRAM STAIN       GRAM POSITIVE COCCI IN CLUSTERS ANAEROBIC BOTTLE                  SMEAR CALLED TO AND CORRECTLY REPEATED BY: VICTORIANO PEDERSEN RN 3N, TO NOEL AT 1847 6/21/2022                  GRAM POSITIVE COCCI IN CLUSTERS AEROBIC BOTTLE                  SMEAR CALLED TO AND CORRECTLY REPEATED BY: GUERO RODRIGUEZ RN 3N AT 0676 ON 6/22/22 TO TSH. Culture result:       STAPHYLOCOCCUS AUREUS GROWING IN BOTH BOTTLES DRAWN No Site Indicated REFER TO [id number] FOR SENSITIVITIES          CULTURE, BLOOD [[id number]]  (Abnormal) Collected: 06/20/22 0915    Order Status: Completed Specimen: Blood Updated: 06/23/22 0944     Special Requests: NO SPECIAL REQUESTS        GRAM STAIN       ANAEROBIC BOTTLE GRAM POSITIVE COCCI IN CLUSTERS                  SMEAR CALLED TO AND CORRECTLY REPEATED BY: DAVIS Ko RN 3N ON 6/21/22 AT 0531 TO TMB. GRAM POSITIVE COCCI IN CLUSTERS AEROBIC BOTTLE                  SMEAR CALLED TO AND CORRECTLY REPEATED BY: ELISA TOLLIVER RN 3N TO 2001 [facility] AT 1864 ON 6/21/22. Culture result:       STAPHYLOCOCCUS AUREUS GROWING IN BOTH BOTTLES DRAWN SITE= RT ARM REFER TO [id number] FOR SENSITIVITIES          CULTURE, BLOOD [[id number]]  (Abnormal)  (Susceptibility) Collected: 06/20/22 0940    Order Status: Completed Specimen: Blood Updated: 06/23/22 0943     Special Requests: NO SPECIAL REQUESTS        GRAM STAIN       ANAEROBIC BOTTLE GRAM POSITIVE COCCI IN CLUSTERS                  SMEAR CALLED TO AND CORRECTLY REPEATED BY: DAVIS Ko RN 3N ON 6/21/22 AT 0531 TO TMB.                   GRAM POSITIVE COCCI IN CLUSTERS AEROBIC BOTTLE                  SMEAR CALLED TO AND CORRECTLY REPEATED BY: [facility], RN 3N, TO NOEL AT 1441 6/21/2022           Culture result:       STAPHYLOCOCCUS AUREUS GROWING IN BOTH BOTTLES DRAWN SITE = [facility]          CULTURE, CATHETER TIP [[id number]] Collected: 06/22/22 1459    Order Status: Canceled Specimen: CVP Catheter Tip     BLOOD CULTURE ID PANEL [[id number]]  (Abnormal) Collected: 06/20/22 0940    Order Status: Completed Specimen: Blood Updated: 06/22/22 0031     Acc. no. from Micro Order [id number]     Enterococcus faecalis Not detected        Enterococcus faecium Not detected        Listeria monocytogenes Not detected        Staphylococcus Detected        Staphylococcus aureus Detected        Staph epidermidis Not detected        Staph lugdunensis Not detected        Streptococcus Not detected        Streptococcus agalactiae (Group B) Not detected        Streptococcus pneumoniae Not detected        Streptococcus pyogenes (Group A) Not detected        Acinetobacter calcoaceticus-baumanii complex Not detected        Bacteroides fragilis Not detected        Enterobacterales species Not detected        Enterobacter cloacae complex Not detected        Escherichia coli Not detected        Klebsiella aerogenes Not detected        Klebsiella oxytoca Not detected        Klebsiella pneumoniae group Not detected        Proteus Not detected        Salmonella Not detected        Serratia marcescens Not detected        Haemophilus influenzae Not detected        Neisseria meningitidis Not detected        Pseudomonas aeruginosa Not detected        Steno maltophilia Not detected        Candida albicans Not detected        Candida auris Not detected        Candida glabrata Not detected        Candida krusei Not detected        Candida parapsilosis Not detected        Candida tropicalis Not detected        Crypto neoformans/gattii Not detected        RESISTANT GENES:            mecA/C and MREJ (Methicillin resistant gene) Not detected        Comment       False positive results may rarely occur.  Correlate with clinical,epidemiologic, and other laboratory findings           Comment: Please see BCID Interpretation Guide in EPIC Links       LEGIONELLA PNEUMOPHILA AG, URINE [[id number]] Collected: 06/21/22 0630 Order Status: Canceled Specimen: Urine     Flynn Paulo, URINE [[id number]] Collected: 06/21/22 0630    Order Status: Canceled Specimen: Urine     CULTURE, RESPIRATORY/SPUTUM/BRONCH Natividadjabari Ashford [[id number]] Collected: 06/21/22 0630    Order Status: Canceled Specimen: Sputum     COVID-19 RAPID TEST [[id number]]  (Abnormal) Collected: 06/20/22 0925    Order Status: Completed Specimen: Nasopharyngeal Updated: 06/20/22 1013     Specimen source Nasopharyngeal        COVID-19 rapid test Detected        Comment:      The specimen is POSITIVE for SARS-CoV-2, the novel coronavirus associated with COVID-19. This test has been authorized by the FDA under an Emergency Use Authorization (EUA) for use by authorized laboratories. Fact sheet for Healthcare Providers: ConventionUpdate.co.nz  Fact sheet for Patients: ConventionUpdate.co.nz       Methodology: Isothermal Nucleic Acid Amplification  CALLED TO AND CORRECTLY REPEATED BY:  DR TIAN ED 6/20/22 AT 1013 TO [facility]         INFLUENZA A & B AG (RAPID TEST) [[id number]] Collected: 06/20/22 0925    Order Status: Completed Specimen: Nasopharyngeal from Nasal washing Updated: 06/20/22 1012     Influenza A Antigen Negative        Comment: A negative result does not exclude influenza virus infection, seasonal or H1N1 due to suboptimal sensitivity. If influenza is circulating in your community, a diagnosis of influenza should be considered based on a patients clinical presentation and empiric antiviral treatment should be considered, if indicated. Influenza B Antigen Negative              Lab results:    Chemistry  Recent Labs     06/26/22  0735   GLU 81   *   K 4.3      CO2 24   BUN 65*   CREA 12.00*   CA 7.7*   AGAP 9   BUCR 5*       CBC w/ Diff  No results for input(s): WBC, RBC, HGB, HCT, PLT, GRANS, LYMPH, EOS, HGBEXT, HCTEXT, PLTEXT, HGBEXT, HCTEXT, PLTEXT in the last 72 hours.     Imaging: report reviewed and as posted by radiologist   Results from [place] encounter on 06/01/22    CT HEAD WO CONT    Narrative  EXAM: CT head    INDICATION: Confusion. COMPARISON: 8/29/2021    TECHNIQUE: Axial CT imaging of the head was performed without intravenous  contrast. Standard multiplanar coronal and sagittal reformatted images were  obtained and are included in interpretation. One or more dose reduction techniques were used on this CT: automated exposure  control, adjustment of the mAs and/or kVp according to patient size, and  iterative reconstruction techniques. The specific techniques used on this CT  exam have been documented in the patient's electronic medical record. Digital  Imaging and Communications in Medicine (DICOM) format image data are available  to nonaffiliated external healthcare facilities or entities on a secure, media  free, reciprocally searchable basis with patient authorization for at least a  12-month period after this study. _______________    FINDINGS:    BRAIN AND POSTERIOR FOSSA: Right central arden hypodensity, new from prior study. No evidence of acute large vessel transcortical infarct or acute parenchymal  hemorrhage. No midline shift or hydrocephalus. EXTRA-AXIAL SPACES AND MENINGES: There are no abnormal extra-axial fluid  collections. CALVARIUM: Intact. SINUSES: Clear. OTHER: None.    _______________    Impression  Right central arden hypodensity, new from prior study, age indeterminate infarct. Correlate clinically.

## 2022-06-28 NOTE — PROGRESS NOTES
Nurse entered room and saw patient on bedside commode. Patient stated he \"feel weak\" and think he was \"going to pass out\" Vital are below.  Will notify MD.       06/28/22 1225   Vitals   BP (!) 75/60     Standing       06/28/22 1234   Vitals   BP (!) 121/99     sitting

## 2022-06-28 NOTE — PROGRESS NOTES
1930- Received bedside report from offgoing RN. Patient is lying in bed resting. No distress noted.     Unknown Prima

## 2022-06-29 LAB
ALBUMIN SERPL-MCNC: 2.3 G/DL (ref 3.4–5)
ALBUMIN/GLOB SERPL: 0.5 {RATIO} (ref 0.8–1.7)
ALP SERPL-CCNC: 133 U/L (ref 45–117)
ALT SERPL-CCNC: 7 U/L (ref 16–61)
AST SERPL-CCNC: 10 U/L (ref 10–38)
BASOPHILS # BLD: 0 K/UL (ref 0–0.1)
BASOPHILS NFR BLD: 0 % (ref 0–2)
BILIRUB DIRECT SERPL-MCNC: <0.1 MG/DL (ref 0–0.2)
BILIRUB SERPL-MCNC: 0.2 MG/DL (ref 0.2–1)
COVID-19 RAPID TEST, COVR: DETECTED
DIFFERENTIAL METHOD BLD: ABNORMAL
EOSINOPHIL # BLD: 0.3 K/UL (ref 0–0.4)
EOSINOPHIL NFR BLD: 3 % (ref 0–5)
ERYTHROCYTE [DISTWIDTH] IN BLOOD BY AUTOMATED COUNT: 20.6 % (ref 11.6–14.5)
GLOBULIN SER CALC-MCNC: 4.8 G/DL (ref 2–4)
GLUCOSE BLD STRIP.AUTO-MCNC: 111 MG/DL (ref 70–110)
GLUCOSE BLD STRIP.AUTO-MCNC: 116 MG/DL (ref 70–110)
GLUCOSE BLD STRIP.AUTO-MCNC: 80 MG/DL (ref 70–110)
GLUCOSE BLD STRIP.AUTO-MCNC: 84 MG/DL (ref 70–110)
HCT VFR BLD AUTO: 23.5 % (ref 36–48)
HGB BLD-MCNC: 7.6 G/DL (ref 13–16)
IMM GRANULOCYTES # BLD AUTO: 0.2 K/UL (ref 0–0.04)
IMM GRANULOCYTES NFR BLD AUTO: 2 % (ref 0–0.5)
LACTATE SERPL-SCNC: 1.9 MMOL/L (ref 0.4–2)
LYMPHOCYTES # BLD: 0.9 K/UL (ref 0.9–3.6)
LYMPHOCYTES NFR BLD: 10 % (ref 21–52)
MCH RBC QN AUTO: 25.6 PG (ref 24–34)
MCHC RBC AUTO-ENTMCNC: 32.3 G/DL (ref 31–37)
MCV RBC AUTO: 79.1 FL (ref 78–100)
MONOCYTES # BLD: 1 K/UL (ref 0.05–1.2)
MONOCYTES NFR BLD: 11 % (ref 3–10)
NEUTS SEG # BLD: 6.3 K/UL (ref 1.8–8)
NEUTS SEG NFR BLD: 74 % (ref 40–73)
NRBC # BLD: 0 K/UL (ref 0–0.01)
NRBC BLD-RTO: 0 PER 100 WBC
PLATELET # BLD AUTO: 352 K/UL (ref 135–420)
PMV BLD AUTO: 9.8 FL (ref 9.2–11.8)
PROT SERPL-MCNC: 7.1 G/DL (ref 6.4–8.2)
RBC # BLD AUTO: 2.97 M/UL (ref 4.35–5.65)
RBC MORPH BLD: ABNORMAL
RBC MORPH BLD: ABNORMAL
SOURCE, COVRS: ABNORMAL
WBC # BLD AUTO: 8.7 K/UL (ref 4.6–13.2)

## 2022-06-29 PROCEDURE — 74011636637 HC RX REV CODE- 636/637: Performed by: HOSPITALIST

## 2022-06-29 PROCEDURE — 85025 COMPLETE CBC W/AUTO DIFF WBC: CPT

## 2022-06-29 PROCEDURE — 82962 GLUCOSE BLOOD TEST: CPT

## 2022-06-29 PROCEDURE — 87040 BLOOD CULTURE FOR BACTERIA: CPT

## 2022-06-29 PROCEDURE — 90935 HEMODIALYSIS ONE EVALUATION: CPT

## 2022-06-29 PROCEDURE — 74011250637 HC RX REV CODE- 250/637: Performed by: INTERNAL MEDICINE

## 2022-06-29 PROCEDURE — 74011000250 HC RX REV CODE- 250: Performed by: INTERNAL MEDICINE

## 2022-06-29 PROCEDURE — 74011250637 HC RX REV CODE- 250/637: Performed by: HOSPITALIST

## 2022-06-29 PROCEDURE — 80076 HEPATIC FUNCTION PANEL: CPT

## 2022-06-29 PROCEDURE — 74011250636 HC RX REV CODE- 250/636: Performed by: HOSPITALIST

## 2022-06-29 PROCEDURE — 65270000046 HC RM TELEMETRY

## 2022-06-29 PROCEDURE — 83605 ASSAY OF LACTIC ACID: CPT

## 2022-06-29 PROCEDURE — 74011250636 HC RX REV CODE- 250/636: Performed by: INTERNAL MEDICINE

## 2022-06-29 PROCEDURE — 87635 SARS-COV-2 COVID-19 AMP PRB: CPT

## 2022-06-29 PROCEDURE — 74011250637 HC RX REV CODE- 250/637: Performed by: STUDENT IN AN ORGANIZED HEALTH CARE EDUCATION/TRAINING PROGRAM

## 2022-06-29 RX ADMIN — HYDRALAZINE HYDROCHLORIDE 20 MG: 20 INJECTION INTRAMUSCULAR; INTRAVENOUS at 00:40

## 2022-06-29 RX ADMIN — CYANOCOBALAMIN TAB 1000 MCG 1000 MCG: 1000 TAB at 08:21

## 2022-06-29 RX ADMIN — HEPARIN SODIUM 5000 UNITS: 5000 INJECTION INTRAVENOUS; SUBCUTANEOUS at 18:34

## 2022-06-29 RX ADMIN — PANTOPRAZOLE SODIUM 40 MG: 40 TABLET, DELAYED RELEASE ORAL at 08:21

## 2022-06-29 RX ADMIN — FUROSEMIDE 80 MG: 40 TABLET ORAL at 21:51

## 2022-06-29 RX ADMIN — Medication 500 MG: at 21:50

## 2022-06-29 RX ADMIN — HEPARIN SODIUM 5000 UNITS: 5000 INJECTION INTRAVENOUS; SUBCUTANEOUS at 11:36

## 2022-06-29 RX ADMIN — Medication 500 MG: at 08:19

## 2022-06-29 RX ADMIN — CARVEDILOL 12.5 MG: 12.5 TABLET, FILM COATED ORAL at 17:35

## 2022-06-29 RX ADMIN — HYDRALAZINE HYDROCHLORIDE 20 MG: 20 INJECTION INTRAMUSCULAR; INTRAVENOUS at 21:57

## 2022-06-29 RX ADMIN — SEVELAMER CARBONATE 1200 MG: 2400 POWDER, FOR SUSPENSION ORAL at 17:35

## 2022-06-29 RX ADMIN — CEFAZOLIN 2 G: 10 INJECTION, POWDER, FOR SOLUTION INTRAVENOUS at 21:51

## 2022-06-29 RX ADMIN — CALCITRIOL CAPSULES 0.25 MCG 0.25 MCG: 0.25 CAPSULE ORAL at 21:50

## 2022-06-29 RX ADMIN — Medication 7 UNITS: at 22:00

## 2022-06-29 RX ADMIN — HEPARIN SODIUM 5000 UNITS: 5000 INJECTION INTRAVENOUS; SUBCUTANEOUS at 03:04

## 2022-06-29 RX ADMIN — SEVELAMER CARBONATE 1200 MG: 2400 POWDER, FOR SUSPENSION ORAL at 12:00

## 2022-06-29 RX ADMIN — SEVELAMER CARBONATE 1200 MG: 2400 POWDER, FOR SUSPENSION ORAL at 08:21

## 2022-06-29 RX ADMIN — ASPIRIN 81 MG: 81 TABLET, COATED ORAL at 08:20

## 2022-06-29 NOTE — PROGRESS NOTES
Intervention: Monitor/Manage Fluid Electrolyte/Acid Base Balance    PROBLEM: HEMODIALYSIS ADULT    INTERVENTION: Hemodynamic stabilization  Maintain BP WNL for this patient while on hemodialysis    INTERVENTION: Fluid Management  Will attempt 2000 ml total fluid removal as tolerated    INTERVENTION: Metabolic / Electrolyte Imbalance Management  3 K + potassium, 3 [place] today with dialysis    GOAL: Signs and symptoms of listed potential problems will be absent or manageable  (reference Hemodialysis ADULT CPG)    OUTCOME: Progressing  HD planned for 3 hours today      Problem: Chronic Renal Failure  Goal: *Fluid and electrolytes stabilized  Outcome: Progressing Towards Goal     Problem: Patient Education: Go to Patient Education Activity  Goal: Patient/Family Education  Outcome: Progressing Towards Goal

## 2022-06-29 NOTE — PROGRESS NOTES
Problem: Falls - Risk of  Goal: *Absence of Falls  Description: Document Neena Lee Fall Risk and appropriate interventions in the flowsheet. Outcome: Progressing Towards Goal  Note: Fall Risk Interventions:  Mobility Interventions: Bed/chair exit alarm         Medication Interventions: Bed/chair exit alarm    Elimination Interventions: Call light in reach,Bed/chair exit alarm,Toilet paper/wipes in reach    History of Falls Interventions: Bed/chair exit alarm         Problem: Patient Education: Go to Patient Education Activity  Goal: Patient/Family Education  Outcome: Progressing Towards Goal     Problem: Pressure Injury - Risk of  Goal: *Prevention of pressure injury  Description: Document Tucker Scale and appropriate interventions in the flowsheet. Outcome: Progressing Towards Goal  Note: Pressure Injury Interventions:  Sensory Interventions: Assess need for specialty bed,Keep linens dry and wrinkle-free    Moisture Interventions: Absorbent underpads    Activity Interventions: Pressure redistribution bed/mattress(bed type)    Mobility Interventions: PT/OT evaluation,Pressure redistribution bed/mattress (bed type)    Nutrition Interventions: Document food/fluid/supplement intake    Friction and Shear Interventions: Apply protective barrier, creams and emollients                Problem: Isolation Precautions - Risk of Spread of Infection  Goal: Prevent transmission of infectious organism to others  Outcome: Progressing Towards Goal     Problem:  Body Temperature -  Risk of, Imbalanced  Goal: Ability to maintain a body temperature within defined limits  Outcome: Progressing Towards Goal  Goal: Will regain or maintain usual level of consciousness  Outcome: Progressing Towards Goal  Goal: Complications related to the disease process, condition or treatment will be avoided or minimized  Outcome: Progressing Towards Goal     Problem: Nutrition Deficits  Goal: Optimize nutrtional status  Outcome: Progressing Towards Goal Problem: Loneliness or Risk for Loneliness  Goal: Demonstrate positive use of time alone when socialization is not possible  Outcome: Progressing Towards Goal     Problem: Risk for Fluid Volume Deficit  Goal: Maintain normal heart rhythm  Outcome: Progressing Towards Goal  Goal: Maintain absence of muscle cramping  Outcome: Progressing Towards Goal  Goal: Maintain normal serum potassium, sodium, calcium, phosphorus, and pH  Outcome: Progressing Towards Goal

## 2022-06-29 NOTE — PROGRESS NOTES
D/C Plan: Discharge home with [facility], will need Medicaid stretcher homes     CM notes the patient will need daily dressing changes and ABX with dialysis. CM spoke with patient who stated he lives alone and has concerns about changing dressing himself. CM discussed how often HH would come out and that he will need a friend or faiy member to receive education on the dressing changes. CM discussed possibility of acute rehab due to daily dressing changes and need for IV ABX with HD,, patient agreed to have referral sent to [facility] EM[facility]. Noted therapy recommendation. CM Met with pt/family and explained CM role and to discuss the plan of care. Pt and family are in agreement with therapy recommendations. Offered freedom of choice for SNF/HH. Provided a list of area agencies/facilities to refer to to assist family with making a determiniation.

## 2022-06-29 NOTE — PROGRESS NOTES
Hospitalist Progress Note    Patient: Floresita Bustos MRN: [id number]  CSN: [id number]    YOB: 1970  Age: 50 y.o. Sex: male    DOA: 6/20/2022 LOS:  LOS: 9 days                Assessment/Plan     Patient Active Problem List   Diagnosis Code    Iron deficiency anemia D50.9    Other restrictive cardiomyopathy ([facility] I42.5    ESRD (end stage renal disease) on dialysis ([facility] N18.6, Z99.2    HTN (hypertension) I10    Hx of BKA, right ([facility] Z89.511    Uremia N19    Non-compliance with renal dialysis ([facility] Z91.15    Hypertension I10    COVID-19 U07.1    Pulmonary edema J81.1    Volume overload E87.70    Sepsis ([facility] A41.9    Bacteremia R78.81    Arteriovenous graft infection ([email or web address] T82. 7XXA        Chief complaint :  Fever, cough, congestion  54 y.o. male with end-stage renal disease, diabetes, CHF, hypertension, s/p right BKA presents to ER with concerns of not feeling well, fever congestion cough and shortness of breath. Feeling better    Sepsis -  POA  Secondary to bacteremia  Source TDC vs AVG    Persistent Bacteremia -  Blood cultures growing MSSA  On cefazolin. ID following. Diarrhea now better, continue with probiotics. Monitor for CDI. LUE non functioning AVG   source of infection  Vascular consulted  S/p excision of infected AV graft left upper arm. S/p TDC placement. Volume overload/pulmonary edema-  Resolved with Fluid removal with HD     ESRD-  Nephrology following  HD per nephrology     Hypertension-  Monitor BP  Continue with home medications.     COVID 19-  Not hypoxic, monitor     DM-  Started on sliding scale insulin, ADA diet.     Right BKA     DVT prophylaxis with heparin       Disposition : discharge planning to rehab    Review of systems  General: No fevers or chills. Cardiovascular: No chest pain or pressure. No palpitations. Pulmonary: No shortness of breath. Gastrointestinal: No nausea, vomiting.        Physical Exam:  General: Awake, cooperative, no acute distress    HEENT: NC, Atraumatic. PERRLA, anicteric sclerae. Lungs: CTA Bilaterally. No Wheezing/Rhonchi/Rales. Heart:  S1 S2,  No murmur, No Rubs, No Gallops  Abdomen: Soft, Non distended, Non tender.  +Bowel sounds,   Extremities: Right BKA, tender LUE AVG site  Psych:   Not anxious or agitated. Neurologic:  No acute neurological deficit. Vital signs/Intake and Output:  Visit Vitals  BP (!) (P) 181/87   Pulse (P) 66   Temp 97.4 °F (36.3 °C) (Oral)   Resp (P) 16   Ht 5' 7\" (1.702 m)   Wt 75.3 kg (166 lb)   SpO2 100%   BMI 26.00 kg/m²     Current Shift:  No intake/output data recorded. Last three shifts:  No intake/output data recorded. Labs: Results:       Chemistry No results for input(s): GLU, NA, K, CL, CO2, BUN, CREA, CA, AGAP, BUCR, TBIL, AP, TP, ALB, GLOB, AGRAT in the last 72 hours. No lab exists for component: GPT   CBC w/Diff Recent Labs     06/29/22  1706   WBC 8.7   RBC 2.97*   HGB 7.6*   HCT 23.5*      GRANS PENDING   LYMPH PENDING   EOS PENDING      Cardiac Enzymes Recent Labs     06/28/22  0856   CPK 19*      Coagulation No results for input(s): PTP, INR, APTT, INREXT, INREXT in the last 72 hours. Lipid Panel Lab Results   Component Value Date/Time    Cholesterol, total 115 06/02/2022 09:45 AM    HDL Cholesterol 62 (H) 06/02/2022 09:45 AM    LDL, calculated 46 06/02/2022 09:45 AM    VLDL, calculated 7 06/02/2022 09:45 AM    Triglyceride 35 06/02/2022 09:45 AM    CHOL/HDL Ratio 1.9 06/02/2022 09:45 AM      BNP No results for input(s): BNPP in the last 72 hours. Liver Enzymes No results for input(s): TP, ALB, TBIL, AP in the last 72 hours.     No lab exists for component: SGOT, GPT, DBIL   Thyroid Studies Lab Results   Component Value Date/Time    TSH 2.38 08/29/2021 01:20 AM        Procedures/imaging: see electronic medical records for all procedures/Xrays and details which were not copied into this note but were reviewed prior to creation of Plan

## 2022-06-29 NOTE — PROGRESS NOTES
ALEXIA wound checked and dressing changed. Will need daily dressing changes, wound packing with iodoform, xeroform over the suture line, 4x4s, ABD, Kerlix and an Ace wrap. Sutures will need to be removed in 2-3 weeks.      Evelyn Cobos NP, pager [phone]  [facility]

## 2022-06-29 NOTE — PROGRESS NOTES
TREATMENT SUMMARY     Patient in room 342 dialyzed for 3 hours. Tolerated tx well with without complaint or complications. RIJ CVCfunctioning without complication accessing or BFR   mL/min   mL/min  2000 ml UF removed with a net UF removal of 1500 ml. Report given to [facility] RN with all questions answered. TREATMENT NOTES      1640 Tx paused due to asymptomatic hypotension. UF goal decreased to 1500 mL Net. ACUTE HEMODIALYSIS FLOW SHEET       PATIENT INFORMATION   [address] [facility]       DR. Sara Vela    []1st Time Acute  []Stat[x] Routine []Urgent [x]Chronic Unit   []Acute Room [x]Bedside  []ICU/CCU []ER   Isolation Precautions: [x]Dialysis[] Airborne []Contact [x]Droplet []Reverse    Special Considerations:_______  [] Blood Consent Verified  [x]N/A   Allergies:[] NKA                 [x] __ Penicillins PenicillinsRashNot Aeebmfsob08/22/2020Deletion Reason: Vancomycin VancomycinOther (comments)Not Specified1/29/2020___________ Code Status [x]Full Code [] DNR  [] Other_____   Diet: [] Renal [] NPO [x] Diabetic   [] Enteral Feeding [] Cardiac Diabetic: [x]Yes []No     [x]Signed Treatment Consent Verified   [x] Time Out/ Safety Check   PRIMARY NURSE REPORT: FIRST INITIAL/ LAST NAME/TITLE  PRE DIALYSIS:     [facility] RN                                      [facility]:[id number]   ACCESS   CATHETER ACCESS: [] N/A  [x] RIGHT  [] LEFT  [x] IJ  [] SUBCL [] FEM                    [] First use X-ray  [x] Tunnel     [] Non-Tunneled      [x] No S/S infection  [] Redness [] Drainage  [] Cultured [] Swelling [] Pain                    [x] Medical Aseptic [] Prep Dressing Changed                  [] Clotted [x] Patent []      Flows: [x] Good [] Poor [] Reversed                 If Access Problem Dr. Gruber Lean: [] Yes [] No    Date:_____  [] N/A[x]   GRAFT/FISTULA ACCESS:  [x] N/A  [] RIGHT  [] LEFT [] UE   [] LE       [] AVG  [] AVF [] BUTTONHOLE    [] +BRUIT/THRILL [] MEDICAL ASEPTIC PREP     [] No S/S infection  [] Redness [] Drainage  [] Cultured [] Swelling [] Pain              If Access Problem Dr. Ofelia Diana: [] Yes [] No    Date:______ [] N/A   GENERAL ASSESSMENT   LUNGS:  SaO2% _100___ [x] Clear [] Coarse [] Crackles [] Wheezing               [] Diminished Location: [] RLL [] LLL [x] RUL [x] KYLAH    COUGH:  [] Productive  [] Loose[] Dry [x] N/A  RESPIRATIONS: [x] Easy [] Labored    THERAPY: [x] RA   [] NC _____. L/min    Mask: [] NRB [] Venti  _____O2%                  [] Ventilator [] Intubated [] Trach [] BiPap [] CPap [] HI Flow   CARDIAC: [x] Regular [] Irregular [] Pericardial Rub [] JVD               Monitored Rhythm:______ [] N/A   EDEMA: [x] None [] Generalized [] Facial [] Pedal [] UE [] LE             [] Pitting [] 1 [] 2 [] 3 [] 4    [] Right [] Left [] Bilateral   SKIN:    [x] Warm [] Hot [] Cold  [x] Dry [] Pale [] Diaphoretic              [] Flushed [] Jaundiced [] Cyanotic [] Rash [] Weeping     LOC:    [] Alert  Oriented to: [x] Person [x] Place [x] Time             [] Confused [] Lethargic [] Medicated [] Non-responsive    GI/ABDOMEN: [] Flat [] Distended [x] Soft [] Firm [] Diarrhea [x] Bowel Sounds Present [] Nausea [] Vomiting    PAIN: [x] 0 [] 1 [] 2 [] 3 [] 4 [] 5 [] 6 [] 7 [] 8 [] 9 [] 10          Scale 1-10 Action/Follow Up_____   MOBILITY: [] Amb [x] Amb/Assist [] Bed  [] Wheelchair    CURRENT LABS   HBsAg ONLY: Date Drawn: 06/01/2022            [x] Negative [] Positive [] Unknown.      HBsAb: Date Drawn:   06/01/2022           [] Susceptible <10 [x] Immune ?10 [] Unknown   Date of Current Labs:  ATTACHED     EDUCATION   Person Educated: [x] Patient [] Other_________   Knowledge base: [] None [] Minimal [] Substantial    Barriers to learning  [] None _______________   Preferred method of learning: [] Written [] Oral [x] Visual [] Hands on    Topic: [] Access Care [] S&S of infection [] Fluid Management   [] K+  [] Procedural  [] Albumin [] Medications [] Tx Options   [] Transplant [] Diet [] Other    Teaching Tools: [] Explain [] Demonstration [] Handout_____ [] Video______  CARE PLAN    [x] Renal Failure (Adult)  Interdisciplinary  · Fluid and electrolytes stabilized  ? Interventions  · Dehydration signs and symptoms (eg: Weight/lab monitoring; vomiting/diarrhea/urine; tenting; mucous membranes; dizziness/lethargy/irritability/confusion; weak pulse; tachycardia; blood pressure; I&O)  · Fluid overload signs and symptoms assessment (eg: Body weight increased; dyspnea; edema; hypertension; respiratory crackles/wheezing; JVD; lab monitoring; mental status changes; I&O)  · Monitor appropriate lab values  · COMPLIANCE WITH PRESCRIBED THERAPY  · ARTERIAL ACCESS SITE ASSESSMENT  · NUTRITION SCREENING  · Vital signs monitoring per assessed patient condition or unit standard  · Cardiac monitoring  · Hydration management  · Intake and output measurement  · Body weight monitoring  · Skin care  · DIALYSIS  · Nutrition Care Process per nutrition screen  · Oral hygiene care every 2 hours  · Pain management     · Outcome   ? [x] Progressing Towards Goal  ? [] Not Progressing Towards Goals  ? [] Goals Met/Resolved  ? [] Goals Not Met/ Resolved        · Patient/ Family Education  ?  Progressing Towards Goals          RO/HEMODIAYLSIS MACHINE SAFETY CHECKS- BEFORE EACH TREATMENT                         [x] [facility]: Machine Serial #2:  [id number]  RO Serial #2: [id number]            Alarm Test: [x] Pass  Time__1328______  [x] RO/Machine Log Complete    [x] Extracorporeal circuit Tested for integrity           Dialyzer__[id number]________   Tubing___21J23-11_________    Dialysate: pH_7.4__  Temp.__36__Conductivity: Meter _14___ HD Machine_14__   CHLORINE TESTING- BEFORE EACH TREATMENT AND EVERY 4 HOURS   Total Chlorine: [x] Less than 0.1 ppm Time:_1328____2nd Check Time:______  (If greater than 0.1 ppm from Primary then every 30 minutes from Secondary)   TREATMENT INIATION-WITH DIALYSIS PRECAUTIONS   [x] All Connections Secured   [x] Saline Line Double Clamped    [x] Venous Parameters Set [x] Arterial Parameters Set    [x] Prime Given 250 ml     [x] Air Foam Detector Engaged   PRE-TREATMENT   UF Calculations: Wt to lose:_2000___ml(+) Oral:__ml(+)IV Meds/Fluids/Blood prods___ml(+) Prime/Rinse_500__ml(=)Total UF Goal_2500___mL   Scale Type:[x] Bed scale [] Sling Scale [] Wheel Chair Scale []  Not Ordered [] [] Unable to obtain pt on stretcher/ bed scale malfunctioning   Tx Initiation Note:  Tx Initiation Note: Received patient in bed semi fowlers position. Right IJ  catheter remains intact. No s/s of infection noted. Dressing to same in place. Treatment initiated as per MD order without incident. Plan to remove 2 liters for 3 hours while monitoring patient's well-being and vital signs.       [x] Time Out/Safety Check  Time:_1340____   INTRADIALYTIC MONITORING  (SEE ATTACHED FLOWSHEET)     POST TREATMENT    Time Medication Dose Volume Route    Initials                                           [facility] Signatures Title Initials Time                     Dialyzer cleared: [x] Good [] Fair [] Poor     Blood Processed _74.1__Liters    Net UF Removed _1500___mL  Post Tx Access:                  AVF/AVG: Bleeding Stop       Art.___min Chaim.____min []+bruit/thrill                Catheter: Locking Solution [x] Heparin 1 ml/1000 units  [x] Normal Saline                                                                               Art._1.6____ ml Chaim.__1.6___ml  Post Assessment:              Skin: [x]Warm [x]Dry []Diaphoretic []Flushed [] Pale []Cyanotic            Lungs: [x]Clear []Coarse []Crackles []Wheezing             Cardiac: [x]Regular []Irregular  []Monitored rhythm____ []N/A            Edema: [x]None []General []Facial []Pedal  []UE []LE []RIGHT []LEFT            Pain: [x]0 []1 []2 []3 []4 []5 []6 []7 []8 []9 []10   POST Tx Note:  HD treatment completed and tolerated well. Patient rinsed with 250 mL 0.9% N/S. 1.6 mL heparin instilled in arterial lumen, 1.6 mL heparin instilled in venous lumen of Right IJ CVC. Red Caps applied to lumen ends securely. Patient remains in  bed 342* in stable condition, freely breathing room air. O2 Sat 100 %. No acute complaints or distress noted.              Primary Nurse Report: First initial/Last name/Title    Post Dialysis:__Parisa Downs RN________________________         Time:____1730____________    Abbreviations: AVG-arterial venous graft, AVF-arterial venous fistula, IJ-Internal Jugular,  Subcl-Subclavian, Fem-Femoral, Tx-treatment, AP/HR-apical heart rate, DFR-dialysate flow rate, BFR-blood flow rate, AP-arterial pressure, -venous pressure, UF-ultrafiltrate, TMP-transmembrane pressure, Chaim-Venous, Art-Arterial, RO-Reverse Osmosis

## 2022-06-29 NOTE — PROGRESS NOTES
Assessment:        Dialysis today , pt is non compliant, cut dialysis short, does not show up for dialysis or comes to HD but then goes to bathroom and spend good time there, then leaves dialysis without  HD. Poor prognosis and high mortality. Recent blood cx is negative from 6/24 and 6/25  Appreciate vascular surgery assistanc  Appreciate ID assistance, antibiotics per ID    Intake and output   Dose all  meds for ESRD   Renal diet, salt and potassium restriction      Cefazolin 2/2/3 gm post HD MWF  Can be arranged with HD.   BMD- has secondary Hyperparathyroidism  Continue  calcitriol 1 mcg MWF  sevelamer for phos binders         Uncontrolled Hypertension: BP is at target range now  Resume coreg 12.5 mg BID  On doxazosine, Imdur and lasix        Anemia of CKD- Retacrit during HD         CC:ESRF         Subjective:   PT does not have any somatic complaints              Blood pressure 132/72, pulse 81, temperature 97.5 °F (36.4 °C), resp. rate 16, height 5' 7\" (1.702 m), weight 75.3 kg (166 lb), SpO2 100 %. NAD, Conversant    Psychicatric: poor judgment and insights, alert and oreinted      No intake or output data in the 24 hours ending 06/29/22 1248   No results for input(s): WBC in the last 72 hours.     No lab exists for component: HEMOGLOBIN, PLATELET  Lab Results   Component Value Date/Time    Sodium 134 (L) 06/26/2022 07:35 AM    Potassium 4.3 06/26/2022 07:35 AM    Chloride 101 06/26/2022 07:35 AM    CO2 24 06/26/2022 07:35 AM    Anion gap 9 06/26/2022 07:35 AM    Glucose 81 06/26/2022 07:35 AM    BUN 65 (H) 06/26/2022 07:35 AM    Creatinine 12.00 (H) 06/26/2022 07:35 AM    BUN/Creatinine ratio 5 (L) 06/26/2022 07:35 AM    GFR est AA 5 (L) 06/26/2022 07:35 AM    GFR est non-AA 4 (L) 06/26/2022 07:35 AM    Calcium 7.7 (L) 06/26/2022 07:35 AM        Current Facility-Administered Medications   Medication Dose Route Frequency Provider Last Rate Last Admin    ceFAZolin (ANCEF) 2 g/20 mL in sterile water IV syringe  2 g IntraVENous Once per day on  RishiEmily benitez MD   2 g at 22    [START ON 2022] ceFAZolin (ANCEF) 3 g/30 mL in sterile water IV syringe  3 g IntraVENous every Friday Fabricio Blackwell MD        Saccharomyces boulardii (FLORASTOR) capsule 500 mg  500 mg Oral BID Emily Blackwell MD   500 mg at 22 0819    diphenhydrAMINE (BENADRYL) injection 25-50 mg  25-50 mg IntraVENous Rad Multiple Deanne Madison MD   50 mg at 22 1356    fentaNYL citrate (PF) injection  mcg   mcg IntraVENous Rad Multiple Deanne Madison MD   50 mcg at 22 1406    naloxone (NARCAN) injection 0.4 mg  0.4 mg IntraVENous PRN Deanne Madison MD        HYDROmorphone (DILAUDID) injection 0.5 mg  0.5 mg IntraVENous Q3H PRN Mehnaz Car PA-C   0.5 mg at 22 0856    oxyCODONE-acetaminophen (PERCOCET) 5-325 mg per tablet 1-2 Tablet  1-2 Tablet Oral Q6H PRN Mehnaz Car PA-C   2 Tablet at 22 1837    insulin glargine (LANTUS) injection 7 Units  7 Units SubCUTAneous QHS Jamie MEJIAS MD   7 Units at 22 2200    epoetin delonte-epbx (RETACRIT) injection 20,000 Units  20,000 Units SubCUTAneous Q MON, WED & FRI Magnus Sen MD   20,000 Units at 22 2238    sevelamer carbonate (RENVELA) oral powder 1,200 mg  1,200 mg Oral TID WITH MEALS José Alston MD   1,200 mg at 22 0821    ondansetron (ZOFRAN) injection 4 mg  4 mg IntraVENous Q8H PRN Derrick Cabrera MD        acetaminophen (TYLENOL) tablet 650 mg  650 mg Oral Q6H PRN Wendy Watson MD   650 mg at 22 1417    Or    acetaminophen (TYLENOL) suppository 650 mg  650 mg Rectal Q6H PRN Wendy Watson MD        guaiFENesin-dextromethorphan (ROBITUSSIN DM) 100-10 mg/5 mL syrup 5 mL  5 mL Oral Q4H PRN Wendy Watson MD        sodium chloride (NS) flush 5-10 mL  5-10 mL IntraVENous PRN Neyda Licea MD   10 mL at 22 0650    carvediloL (COREG) tablet 12.5 mg  12.5 mg Oral BID WITH MEALS José Alston MD   12.5 mg at 06/28/22 1900    calcitRIOL (ROCALTROL) capsule 0.25 mcg  0.25 mcg Oral Q MON, WED & José Deshpande MD   0.25 mcg at 06/27/22 2209    aspirin delayed-release tablet 81 mg  81 mg Oral DAILY Adriane Marino MD   81 mg at 06/29/22 0820    cyanocobalamin tablet 1,000 mcg  1,000 mcg Oral DAILY Adriane Marino MD   1,000 mcg at 06/29/22 7702    doxazosin (CARDURA) tablet 2 mg  2 mg Oral DAILY Mario MEJIAS MD   2 mg at 06/28/22 0825    furosemide (LASIX) tablet 80 mg  80 mg Oral BID Adriane Marino MD   80 mg at 06/28/22 2046    isosorbide mononitrate ER (IMDUR) tablet 60 mg  60 mg Oral DAILY Mario MEJIAS MD   60 mg at 06/28/22 0825    pantoprazole (PROTONIX) tablet 40 mg  40 mg Oral ACB Mario MEJIAS MD   40 mg at 06/29/22 3447    glucose chewable tablet 16 g  16 g Oral PRN Adriane Marino MD        glucagon (GLUCAGEN) injection 1 mg  1 mg IntraMUSCular PRN Adriane Marino MD        insulin lispro (HUMALOG) injection   SubCUTAneous AC&HS Mario MEJIAS MD   4 Units at 06/23/22 1142    dextrose 10% infusion 0-250 mL  0-250 mL IntraVENous PRN Adriane Marino MD        hydrALAZINE (APRESOLINE) 20 mg/mL injection 20 mg  20 mg IntraVENous Q6H PRN Adriane Marino MD   20 mg at 06/29/22 0040    heparin (porcine) injection 5,000 Units  5,000 Units SubCUTAneous Q8H Mario MEJIAS MD   5,000 Units at 06/29/22 1136    heparin (porcine) 1,000 unit/mL injection 3,600 Units  3,600 Units Hemodialysis DIALYSIS PRN Charleen Nieves MD   3,600 Units at 06/27/22 1345

## 2022-06-29 NOTE — PROGRESS NOTES
[facility]  (A Division of [address] [place] [address])                                                                                                                      Marielos Case MD  Office #: - Option # [phone]  Fax #: [phone]     Date of Admission: 6/20/2022Date of Note: 6/29/2022  Reason for Referral: Evaluation and antibiotic management of bacteremia/COVID 10 Infection       Current Antimicrobials:    Prior Antimicrobials:    Cefazolin 6/26 to date   Cefepime 6/20 to 6/21  daptomycin  6/20 to 6/26       Assessment- ID related:  --------------------------------------------------------------------------    · 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 Proximal vein part well incorporated and overswen  Right temp HD cath placed  · COVID 19 Infection-- doubt viral PNA  --fully vaccinared and boosted 11/2021  --not clear when viral infection was acquired  · Pul edema Vs PNA-- had few missed HD sessions  · Occluded L AVG  · History of C.diff in the past    Other Medical Issues- Mx per respective team:  ESRD on HD  DM  R BKA  Hx of AMS with severe Hypoglycemia== recent admission      Recommendation for ID issues I am following:  ------------------------------------------------------------------------------  Wound care for L arm-per vascular  DW with Dr William Doherty in regards to his surgical finding-- appears all infected graft part was well excised    --cbc w/diff, lft, blood cultuer, l.acid    --BCX from 6/24, 6/25 and 6/27: NGSF  --R TDC placed 6/26    Cefazolin 2/2/3 gm post HD MWF    Plan for 6 weeks from 6/24/22( or last negative bcx)  -- survelliance culture post rx - weekly X3    Removal of R femoral TLC soon    Cont probiotics.    Monitor for diarrhea-promptly start CDI treatment if loose bm with increasing wbc               Subjective:  Pt seen, on HD- labile BP  No complaints  DW RN-- BM is not loose, firming up  Review of systems:    No F/C/R  No N/V or abd pain or distension         HPI:  Dima Lynn is a 50 y.o. BLACK/ with PMH of ESRD on HD, DM. Recently admitted to [facility] with AMS and hypoglycemia. During his stay, he had underwent vascular study for occlusion on left arm, and was not able to use it, and R TDC was placed. He had missed couple of his HD sessions and he was feeling body aches and SOB and had cough with clear mucus. He came in to ED, felt he could have PNA. Other associated symptoms include N/V and loose BM without   Had high BP to 208/101, fever to 102.1 after admission. .  Currently on Vanco and Cefepime. Reports occasional pain on L arm. No abd pain. Admission CXR with cardiomegaly w/mild pul edema. WBC at 12K, no bands           Active Hospital Problems    Diagnosis Date Noted    Sepsis ([address] 06/22/2022    Bacteremia 06/22/2022    Arteriovenous graft infection ([address] 06/22/2022    COVID-19 06/20/2022    Pulmonary edema 06/20/2022    Volume overload 06/20/2022    ESRD (end stage renal disease) on dialysis ([address] 01/29/2020     Past Medical History:   Diagnosis Date    Chronic kidney disease     Diabetes ([address]     Heart failure ([address]     [facility]     Hypertension     Sickle cell trait ([facility]      Past Surgical History:   Procedure Laterality Date    HX ORTHOPAEDIC      right BKA 2017    IR DECLOT AV GRAFT PERCUTANEOUS  6/3/2022    IR INSERT NON TUNL CVC OVER 5 YRS  6/1/2022     History reviewed. No pertinent family history.   Social History     Socioeconomic History    Marital status: LEGALLY      Spouse name: Not on file    Number of children: Not on file    Years of education: Not on file    Highest education level: Not on file   Occupational History    Not on file   Tobacco Use    Smoking status: Former Smoker    Smokeless tobacco: Never Used   Substance and Sexual Activity    Alcohol use: No    Drug use: No    Sexual activity: Not on file   Other Topics Concern    Not on file   Social History Narrative    Not on file     Social Determinants of Health     Financial Resource Strain:     Difficulty of Paying Living Expenses: Not on file   Food Insecurity:     Worried About Running Out of Food in the Last Year: Not on file    Danii of Food in the Last Year: Not on file   Transportation Needs:     Lack of Transportation (Medical): Not on file    Lack of Transportation (Non-Medical):  Not on file   Physical Activity:     Days of Exercise per Week: Not on file    Minutes of Exercise per Session: Not on file   Stress:     Feeling of Stress : Not on file   Social Connections:     Frequency of Communication with Friends and Family: Not on file    Frequency of Social Gatherings with Friends and Family: Not on file    Attends [employer]ism Services: Not on file    Active Member of [address] [place] [address] or Organizations: Not on file    Attends Club or Organization Meetings: Not on file    Marital Status: Not on file   Intimate Partner Violence:     Fear of Current or Ex-Partner: Not on file    Emotionally Abused: Not on file    Physically Abused: Not on file    Sexually Abused: Not on file   Housing Stability:     Unable to Pay for Housing in the Last Year: Not on file    Number of [place] in the Last Year: Not on file    Unstable Housing in the Last Year: Not on file       Allergies:  Penicillins and Vancomycin     Medications:  Current Facility-Administered Medications   Medication Dose Route Frequency    ceFAZolin (ANCEF) 2 g/20 mL in sterile water IV syringe  2 g IntraVENous Once per day on Mon Wed    [START ON 7/1/2022] ceFAZolin (ANCEF) 3 g/30 mL in sterile water IV syringe  3 g IntraVENous every Friday    Saccharomyces boulardii (FLORASTOR) capsule 500 mg  500 mg Oral BID    diphenhydrAMINE (BENADRYL) injection 25-50 mg  25-50 mg IntraVENous Rad Multiple    fentaNYL citrate (PF) injection  mcg   mcg IntraVENous Rad Multiple    naloxone (NARCAN) injection 0.4 mg  0.4 mg IntraVENous PRN    HYDROmorphone (DILAUDID) injection 0.5 mg  0.5 mg IntraVENous Q3H PRN    oxyCODONE-acetaminophen (PERCOCET) 5-325 mg per tablet 1-2 Tablet  1-2 Tablet Oral Q6H PRN    insulin glargine (LANTUS) injection 7 Units  7 Units SubCUTAneous QHS    epoetin delonte-epbx (RETACRIT) injection 20,000 Units  20,000 Units SubCUTAneous Q MON, WED & FRI    sevelamer carbonate (RENVELA) oral powder 1,200 mg  1,200 mg Oral TID WITH MEALS    ondansetron (ZOFRAN) injection 4 mg  4 mg IntraVENous Q8H PRN    acetaminophen (TYLENOL) tablet 650 mg  650 mg Oral Q6H PRN    Or    acetaminophen (TYLENOL) suppository 650 mg  650 mg Rectal Q6H PRN    guaiFENesin-dextromethorphan (ROBITUSSIN DM) 100-10 mg/5 mL syrup 5 mL  5 mL Oral Q4H PRN    sodium chloride (NS) flush 5-10 mL  5-10 mL IntraVENous PRN    carvediloL (COREG) tablet 12.5 mg  12.5 mg Oral BID WITH MEALS    calcitRIOL (ROCALTROL) capsule 0.25 mcg  0.25 mcg Oral Q MON, WED & FRI    aspirin delayed-release tablet 81 mg  81 mg Oral DAILY    cyanocobalamin tablet 1,000 mcg  1,000 mcg Oral DAILY    doxazosin (CARDURA) tablet 2 mg  2 mg Oral DAILY    furosemide (LASIX) tablet 80 mg  80 mg Oral BID    isosorbide mononitrate ER (IMDUR) tablet 60 mg  60 mg Oral DAILY    pantoprazole (PROTONIX) tablet 40 mg  40 mg Oral ACB    glucose chewable tablet 16 g  16 g Oral PRN    glucagon (GLUCAGEN) injection 1 mg  1 mg IntraMUSCular PRN    insulin lispro (HUMALOG) injection   SubCUTAneous AC&HS    dextrose 10% infusion 0-250 mL  0-250 mL IntraVENous PRN    hydrALAZINE (APRESOLINE) 20 mg/mL injection 20 mg  20 mg IntraVENous Q6H PRN    heparin (porcine) injection 5,000 Units  5,000 Units SubCUTAneous Q8H    heparin (porcine) 1,000 unit/mL injection 3,600 Units  3,600 Units Hemodialysis DIALYSIS PRN      Physical Exam:    Temp (24hrs), Av.6 °F (36.4 °C), Min:97.3 °F (36.3 °C), Max:97.9 °F (36.6 °C)    Visit Vitals  BP (!) 187/98   Pulse 85   Temp 97.4 °F (36.3 °C) (Oral)   Resp 16   Ht 5' 7\" (1.702 m)   Wt 75.3 kg (166 lb)   SpO2 100%   BMI 26.00 kg/m²     R chest  TDC in place--removed   New TDC placed   R groin-- HD cath in place, dressing over the line     GEN: WD Chronically ill looking - on 2L NC  not in resp distress. HEENT: Unicteric. EOMI intact  No neck swelling  CHEST: Non laboured breathing. CVS:RRR, no mur/gallop  ABD: Obese/soft. Non tender. Non distended  Skin: Dry and intact. No rash, no redness. CNS: CN grossly ok.       Microbiology  All Micro Results     Procedure Component Value Units Date/Time    CULTURE, BLOOD [[id number]]     Order Status: Sent Specimen: Blood     CULTURE, BLOOD [[id number]] Collected: 22 0726    Order Status: Completed Specimen: Blood Updated: 22     Special Requests: NO SPECIAL REQUESTS        Culture result: NO GROWTH 3 DAYS       CULTURE, BLOOD [[id number]] Collected: 22 1215    Order Status: Completed Specimen: Blood Updated: 22     Special Requests: NO SPECIAL REQUESTS        Culture result: NO GROWTH 4 DAYS       CULTURE, BLOOD [[id number]] Collected: 22 0100    Order Status: Completed Specimen: Blood Updated: 22     Special Requests: NO SPECIAL REQUESTS        Culture result: NO GROWTH 5 DAYS       CULTURE, WOUND Ludivina Ly STAIN [[id number]]  (Abnormal)  (Susceptibility) Collected: 22 1930    Order Status: Completed Specimen: Wound from Arm Updated: 22 0913     Special Requests: NO SPECIAL REQUESTS        GRAM STAIN RARE WBCS SEEN         NO ORGANISMS SEEN        Culture result:       SCANT STAPHYLOCOCCUS AUREUS          CULTURE, WOUND Ludivina Ly STAIN [[id number]]  (Abnormal)  (Susceptibility) Collected: 22 1505    Order Status: Completed Specimen: Catheter Tip Updated: 22 9565     Special Requests: NO SPECIAL REQUESTS        Culture result:       COLONY COUNT =>100 COLONIES STAPHYLOCOCCUS AUREUS          CULTURE, ANAEROBIC [[id number]] Collected: 06/24/22 1930    Order Status: Canceled Specimen: Surgical Specimen     CULTURE, Dorothe Jerry STAIN [[id number]]  (Abnormal)  (Susceptibility) Collected: 06/22/22 0915    Order Status: Completed Specimen: Wound from Abscess Updated: 06/24/22 0856     Special Requests: NO SPECIAL REQUESTS        GRAM STAIN OCCASIONAL WBCS SEEN               RARE GRAM POSITIVE COCCI IN PAIRS           Culture result:       HEAVY STAPHYLOCOCCUS AUREUS          C. DIFFICILE AG & TOXIN A/B [[id number]]  (Abnormal) Collected: 06/22/22 1945    Order Status: Completed Specimen: Stool from Miscellaneous sample Updated: 06/23/22 1410     PCR Reflex       See Reflex order for C. difficile (DNA)           INTERPRETATION       Indeterminate for Toxigenic C. difficile, DNA confirmation to follow. Anicassi Khan DIFF MOLECULAR [[id number]] Collected: 06/22/22 1945    Order Status: Completed Specimen: Miscellaneous sample Updated: 06/23/22 1410     C Diff Molecular Negative        Comment: This specimen is negative for toxigenic C difficile by DNA amplification. Repeat testing is not recommended for confirmation, samples received within 7 days of this negative result will be rejected.        CULTURE, BLOOD [[id number]]  (Abnormal) Collected: 06/21/22 0244    Order Status: Completed Specimen: Blood Updated: 06/23/22 1336     Special Requests: NO SPECIAL REQUESTS        GRAM STAIN       GRAM POSITIVE COCCI IN CLUSTERS ANAEROBIC BOTTLE                  SMEAR CALLED TO AND CORRECTLY REPEATED BY: Carol Loomis RN 3N, TO JAF AT 1847 6/21/2022           Culture result:       STAPHYLOCOCCUS AUREUS GROWING IN BOTH BOTTLES DRAWN No Site Indicated REFER TO [id number] FOR SENSITIVITIES          CULTURE, BLOOD [[id number]]  (Abnormal) Collected: 06/21/22 0253    Order Status: Completed Specimen: Blood Updated: 06/23/22 1335     Special Requests: NO SPECIAL REQUESTS        GRAM STAIN       GRAM POSITIVE COCCI IN CLUSTERS ANAEROBIC BOTTLE                  SMEAR CALLED TO AND CORRECTLY REPEATED BY: VICTORIANO PEDERSEN, RN 3N, TO NOEL AT 1847 6/21/2022                  GRAM POSITIVE COCCI IN CLUSTERS AEROBIC BOTTLE                  SMEAR CALLED TO AND CORRECTLY REPEATED BY: GUERO RODRIGUEZ RN 3N AT 9776 ON 6/22/22 TO TSH. Culture result:       STAPHYLOCOCCUS AUREUS GROWING IN BOTH BOTTLES DRAWN No Site Indicated REFER TO [id number] FOR SENSITIVITIES          CULTURE, BLOOD [[id number]]  (Abnormal) Collected: 06/20/22 0915    Order Status: Completed Specimen: Blood Updated: 06/23/22 0944     Special Requests: NO SPECIAL REQUESTS        GRAM STAIN       ANAEROBIC BOTTLE GRAM POSITIVE COCCI IN CLUSTERS                  SMEAR CALLED TO AND CORRECTLY REPEATED BY: DAVIS Ko RN 3N ON 6/21/22 AT 0531 TO TMB. GRAM POSITIVE COCCI IN CLUSTERS AEROBIC BOTTLE                  SMEAR CALLED TO AND CORRECTLY REPEATED BY: ELISA TOLLIVER RN 3N TO [address] [employer] [facility] AT 2861 ON 6/21/22. Culture result:       STAPHYLOCOCCUS AUREUS GROWING IN BOTH BOTTLES DRAWN SITE= RT ARM REFER TO [id number] FOR SENSITIVITIES          CULTURE, BLOOD [[id number]]  (Abnormal)  (Susceptibility) Collected: 06/20/22 0940    Order Status: Completed Specimen: Blood Updated: 06/23/22 0943     Special Requests: NO SPECIAL REQUESTS        GRAM STAIN       ANAEROBIC BOTTLE GRAM POSITIVE COCCI IN CLUSTERS                  SMEAR CALLED TO AND CORRECTLY REPEATED BY: DAVIS Ko RN 3N ON 6/21/22 AT 0531 TO TMB.                   GRAM POSITIVE COCCI IN CLUSTERS AEROBIC BOTTLE                  SMEAR CALLED TO AND CORRECTLY REPEATED BY: [facility], RN 3N, TO NOEL AT 1441 6/21/2022           Culture result:       STAPHYLOCOCCUS AUREUS GROWING IN BOTH BOTTLES DRAWN SITE = RAR          CULTURE, CATHETER TIP [[id number]] Collected: 06/22/22 1459    Order Status: Canceled Specimen: CVP Catheter Tip     BLOOD CULTURE ID PANEL [[id number]]  (Abnormal) Collected: 06/20/22 0940    Order Status: Completed Specimen: Blood Updated: 06/22/22 0031     Acc. no. from Micro Order [id number]     Enterococcus faecalis Not detected        Enterococcus faecium Not detected        Listeria monocytogenes Not detected        Staphylococcus Detected        Staphylococcus aureus Detected        Staph epidermidis Not detected        Staph lugdunensis Not detected        Streptococcus Not detected        Streptococcus agalactiae (Group B) Not detected        Streptococcus pneumoniae Not detected        Streptococcus pyogenes (Group A) Not detected        Acinetobacter calcoaceticus-baumanii complex Not detected        Bacteroides fragilis Not detected        Enterobacterales species Not detected        Enterobacter cloacae complex Not detected        Escherichia coli Not detected        Klebsiella aerogenes Not detected        Klebsiella oxytoca Not detected        Klebsiella pneumoniae group Not detected        Proteus Not detected        Salmonella Not detected        Serratia marcescens Not detected        Haemophilus influenzae Not detected        Neisseria meningitidis Not detected        Pseudomonas aeruginosa Not detected        Steno maltophilia Not detected        Candida albicans Not detected        Candida auris Not detected        Candida glabrata Not detected        Candida krusei Not detected        Candida parapsilosis Not detected        Candida tropicalis Not detected        Crypto neoformans/gattii Not detected        RESISTANT GENES:            mecA/C and MREJ (Methicillin resistant gene) Not detected        Comment       False positive results may rarely occur.  Correlate with clinical,epidemiologic, and other laboratory findings           Comment: Please see BCID Interpretation Guide in EPIC Links       LEGIONELLA PNEUMOPHILA AG, URINE [[id number]] Collected: 06/21/22 0630 Order Status: Canceled Specimen: Urine     Mckenzie Edgar URINE [[id number]] Collected: 06/21/22 0630    Order Status: Canceled Specimen: Urine     CULTURE, RESPIRATORY/SPUTUM/BRONCH Annye Parody [[id number]] Collected: 06/21/22 0630    Order Status: Canceled Specimen: Sputum     COVID-19 RAPID TEST [[id number]]  (Abnormal) Collected: 06/20/22 0925    Order Status: Completed Specimen: Nasopharyngeal Updated: 06/20/22 1013     Specimen source Nasopharyngeal        COVID-19 rapid test Detected        Comment:      The specimen is POSITIVE for SARS-CoV-2, the novel coronavirus associated with COVID-19. This test has been authorized by the FDA under an Emergency Use Authorization (EUA) for use by authorized laboratories. Fact sheet for Healthcare Providers: ConventionUpdate.co.nz  Fact sheet for Patients: ConventionUpdate.co.nz       Methodology: Isothermal Nucleic Acid Amplification  CALLED TO AND CORRECTLY REPEATED BY:  DR TIAN ED 6/20/22 AT 1013 TO [facility]         INFLUENZA A & B AG (RAPID TEST) [[id number]] Collected: 06/20/22 0925    Order Status: Completed Specimen: Nasopharyngeal from Nasal washing Updated: 06/20/22 1012     Influenza A Antigen Negative        Comment: A negative result does not exclude influenza virus infection, seasonal or H1N1 due to suboptimal sensitivity. If influenza is circulating in your community, a diagnosis of influenza should be considered based on a patients clinical presentation and empiric antiviral treatment should be considered, if indicated. Influenza B Antigen Negative              Lab results:    Chemistry  No results for input(s): GLU, NA, K, CL, CO2, BUN, CREA, CA, AGAP, BUCR, TBIL, AP, TP, ALB, GLOB, AGRAT in the last 72 hours.     No lab exists for component: GPT    CBC w/ Diff  No results for input(s): WBC, RBC, HGB, HCT, PLT, GRANS, LYMPH, EOS, HGBEXT, HCTEXT, PLTEXT, HGBEXT, HCTEXT, PLTEXT in the last 72 hours.    Imaging: report reviewed and as posted by radiologist   [facility]    CT HEAD WO CONT    Narrative  EXAM: CT head    INDICATION: Confusion. COMPARISON: 8/29/2021    TECHNIQUE: Axial CT imaging of the head was performed without intravenous  contrast. Standard multiplanar coronal and sagittal reformatted images were  obtained and are included in interpretation. One or more dose reduction techniques were used on this CT: automated exposure  control, adjustment of the mAs and/or kVp according to patient size, and  iterative reconstruction techniques. The specific techniques used on this CT  exam have been documented in the patient's electronic medical record. Digital  Imaging and Communications in Medicine (DICOM) format image data are available  to nonaffiliated external healthcare facilities or entities on a secure, media  free, reciprocally searchable basis with patient authorization for at least a  12-month period after this study. _______________    FINDINGS:    BRAIN AND POSTERIOR FOSSA: Right central arden hypodensity, new from prior study. No evidence of acute large vessel transcortical infarct or acute parenchymal  hemorrhage. No midline shift or hydrocephalus. EXTRA-AXIAL SPACES AND MENINGES: There are no abnormal extra-axial fluid  collections. CALVARIUM: Intact. SINUSES: Clear. OTHER: None.    _______________    Impression  Right central arden hypodensity, new from prior study, age indeterminate infarct. Correlate clinically.

## 2022-06-30 LAB
BACTERIA SPEC CULT: NORMAL
GLUCOSE BLD STRIP.AUTO-MCNC: 104 MG/DL (ref 70–110)
GLUCOSE BLD STRIP.AUTO-MCNC: 115 MG/DL (ref 70–110)
GLUCOSE BLD STRIP.AUTO-MCNC: 65 MG/DL (ref 70–110)
GLUCOSE BLD STRIP.AUTO-MCNC: 86 MG/DL (ref 70–110)
SERVICE CMNT-IMP: NORMAL

## 2022-06-30 PROCEDURE — 82962 GLUCOSE BLOOD TEST: CPT

## 2022-06-30 PROCEDURE — 74011250637 HC RX REV CODE- 250/637: Performed by: HOSPITALIST

## 2022-06-30 PROCEDURE — 74011250637 HC RX REV CODE- 250/637: Performed by: INTERNAL MEDICINE

## 2022-06-30 PROCEDURE — 74011250636 HC RX REV CODE- 250/636: Performed by: HOSPITALIST

## 2022-06-30 PROCEDURE — 65270000046 HC RM TELEMETRY

## 2022-06-30 PROCEDURE — 97162 PT EVAL MOD COMPLEX 30 MIN: CPT

## 2022-06-30 PROCEDURE — 74011250637 HC RX REV CODE- 250/637: Performed by: SURGERY

## 2022-06-30 PROCEDURE — 74011250637 HC RX REV CODE- 250/637: Performed by: STUDENT IN AN ORGANIZED HEALTH CARE EDUCATION/TRAINING PROGRAM

## 2022-06-30 PROCEDURE — 97116 GAIT TRAINING THERAPY: CPT

## 2022-06-30 RX ADMIN — DOXAZOSIN 2 MG: 1 TABLET ORAL at 10:30

## 2022-06-30 RX ADMIN — CARVEDILOL 12.5 MG: 12.5 TABLET, FILM COATED ORAL at 18:51

## 2022-06-30 RX ADMIN — OXYCODONE AND ACETAMINOPHEN 2 TABLET: 5; 325 TABLET ORAL at 10:54

## 2022-06-30 RX ADMIN — FUROSEMIDE 80 MG: 40 TABLET ORAL at 10:30

## 2022-06-30 RX ADMIN — PANTOPRAZOLE SODIUM 40 MG: 40 TABLET, DELAYED RELEASE ORAL at 06:51

## 2022-06-30 RX ADMIN — Medication 500 MG: at 10:30

## 2022-06-30 RX ADMIN — HEPARIN SODIUM 5000 UNITS: 5000 INJECTION INTRAVENOUS; SUBCUTANEOUS at 18:50

## 2022-06-30 RX ADMIN — HEPARIN SODIUM 5000 UNITS: 5000 INJECTION INTRAVENOUS; SUBCUTANEOUS at 02:30

## 2022-06-30 RX ADMIN — HEPARIN SODIUM 5000 UNITS: 5000 INJECTION INTRAVENOUS; SUBCUTANEOUS at 10:31

## 2022-06-30 RX ADMIN — ASPIRIN 81 MG: 81 TABLET, COATED ORAL at 10:30

## 2022-06-30 RX ADMIN — FUROSEMIDE 80 MG: 40 TABLET ORAL at 20:30

## 2022-06-30 RX ADMIN — Medication 500 MG: at 20:30

## 2022-06-30 RX ADMIN — SEVELAMER CARBONATE 1200 MG: 2400 POWDER, FOR SUSPENSION ORAL at 10:30

## 2022-06-30 RX ADMIN — HYDRALAZINE HYDROCHLORIDE 20 MG: 20 INJECTION INTRAMUSCULAR; INTRAVENOUS at 06:44

## 2022-06-30 RX ADMIN — CYANOCOBALAMIN TAB 1000 MCG 1000 MCG: 1000 TAB at 10:30

## 2022-06-30 RX ADMIN — ISOSORBIDE MONONITRATE 60 MG: 60 TABLET, EXTENDED RELEASE ORAL at 10:30

## 2022-06-30 RX ADMIN — CARVEDILOL 12.5 MG: 12.5 TABLET, FILM COATED ORAL at 10:30

## 2022-06-30 NOTE — PROGRESS NOTES
Hospitalist Progress Note    Patient: Bashir Lyons MRN: [id number]  CSN: [id number]    YOB: 1970  Age: 50 y.o. Sex: male    DOA: 6/20/2022 LOS:  LOS: 10 days                Assessment/Plan     Patient Active Problem List   Diagnosis Code    Iron deficiency anemia D50.9    Other restrictive cardiomyopathy ([facility] I42.5    ESRD (end stage renal disease) on dialysis ([facility] N18.6, Z99.2    HTN (hypertension) I10    Hx of BKA, right ([facility] Z89.511    Uremia N19    Non-compliance with renal dialysis ([facility] Z91.15    Hypertension I10    COVID-19 U07.1    Pulmonary edema J81.1    Volume overload E87.70    Sepsis ([facility] A41.9    Bacteremia R78.81    Arteriovenous graft infection ([facility] T82. 7XXA        Chief complaint :  Fever, cough, congestion  54 y.o. male with end-stage renal disease, diabetes, CHF, hypertension, s/p right BKA presents to ER with concerns of not feeling well, fever congestion cough and shortness of breath. Feeling better    Sepsis -  POA  Secondary to bacteremia  Source TDC vs AVG    Persistent Bacteremia -  Blood cultures growing MSSA  On cefazolin. ID following. Diarrhea now better, continue with probiotics. Monitor for CDI. LUE non functioning AVG   source of infection  Vascular consulted  S/p excision of infected AV graft left upper arm. S/p TDC placement. Volume overload/pulmonary edema-  Resolved with Fluid removal with HD     ESRD-  Nephrology following  HD per nephrology     Hypertension-  Monitor BP  Continue with home medications.     COVID 19-  Not hypoxic, monitor  Repeat rapid COVID 19 positive.     DM-  Started on sliding scale insulin, ADA diet. Blood sugars low, stop lantus.     Right BKA     DVT prophylaxis with heparin       Disposition : discharge planning to rehab    Review of systems  General: No fevers or chills. Cardiovascular: No chest pain or pressure. No palpitations. Pulmonary: No shortness of breath.    Gastrointestinal: No nausea, vomiting. Physical Exam:  General: Awake, cooperative, no acute distress    HEENT: NC, Atraumatic. PERRLA, anicteric sclerae. Lungs: CTA Bilaterally. No Wheezing/Rhonchi/Rales. Heart:  S1 S2,  No murmur, No Rubs, No Gallops  Abdomen: Soft, Non distended, Non tender.  +Bowel sounds,   Extremities: Right BKA, tender LUE AVG site  Psych:   Not anxious or agitated. Neurologic:  No acute neurological deficit. Vital signs/Intake and Output:  Visit Vitals  BP (!) 72/57   Pulse 85   Temp 97.8 °F (36.6 °C)   Resp 16   Ht 5' 7\" (1.702 m)   Wt 75.3 kg (166 lb)   SpO2 100%   BMI 26.00 kg/m²     Current Shift:  No intake/output data recorded. Last three shifts:  06/28 1901 - 06/30 0700  In: 0   Out: 1339             Labs: Results:       Chemistry Recent Labs     06/29/22  1706   *   TP 7.1   ALB 2.3*   GLOB 4.8*   AGRAT 0.5*      CBC w/Diff Recent Labs     06/29/22  1706   WBC 8.7   RBC 2.97*   HGB 7.6*   HCT 23.5*      GRANS 74*   LYMPH 10*   EOS 3      Cardiac Enzymes Recent Labs     06/28/22  0856   CPK 19*      Coagulation No results for input(s): PTP, INR, APTT, INREXT, INREXT in the last 72 hours. Lipid Panel Lab Results   Component Value Date/Time    Cholesterol, total 115 06/02/2022 09:45 AM    HDL Cholesterol 62 (H) 06/02/2022 09:45 AM    LDL, calculated 46 06/02/2022 09:45 AM    VLDL, calculated 7 06/02/2022 09:45 AM    Triglyceride 35 06/02/2022 09:45 AM    CHOL/HDL Ratio 1.9 06/02/2022 09:45 AM      BNP No results for input(s): BNPP in the last 72 hours.    Liver Enzymes Recent Labs     06/29/22  1706   TP 7.1   ALB 2.3*   *      Thyroid Studies Lab Results   Component Value Date/Time    TSH 2.38 08/29/2021 01:20 AM        Procedures/imaging: see electronic medical records for all procedures/Xrays and details which were not copied into this note but were reviewed prior to creation of Plan

## 2022-06-30 NOTE — PROGRESS NOTES
Assessment:        Dialysis today , pt is non compliant, cut dialysis short, does not show up for dialysis or comes to HD but then goes to bathroom and spend good time there, then leaves dialysis without  HD. Poor prognosis and high mortality. Recent blood cx is negative from 6/24 and 6/25  Appreciate vascular surgery assistanc  Appreciate ID assistance, antibiotics per ID    Intake and output   Dose all  meds for ESRD   Renal diet, salt and potassium restriction      Cefazolin 2/2/3 gm post HD MWF  Can be arranged with HD.   BMD- has secondary Hyperparathyroidism  Continue  calcitriol 1 mcg MWF  sevelamer for phos binders         Uncontrolled Hypertension: BP is at target range now  Resume coreg 12.5 mg BID  On doxazosine, Imdur and lasix        Anemia of CKD- Retacrit during HD         CC:ESRF           Subjective:   PT does not have any somatic complaints           Review of Systems  Negative for  SOB,  Nausea, vomiting        Blood pressure (!) 124/54, pulse 85, temperature 97.8 °F (36.6 °C), resp. rate 16, height 5' 7\" (1.702 m), weight 75.3 kg (166 lb), SpO2 100 %.       NAD, Conversant     Psychicatric: poor judgment and insights, alert and oreinted           Intake/Output Summary (Last 24 hours) at 6/30/2022 1801  Last data filed at 6/30/2022 0644  Gross per 24 hour   Intake 0 ml   Output 0 ml   Net 0 ml      Recent Labs     06/29/22  1706   WBC 8.7     Lab Results   Component Value Date/Time    Sodium 134 (L) 06/26/2022 07:35 AM    Potassium 4.3 06/26/2022 07:35 AM    Chloride 101 06/26/2022 07:35 AM    CO2 24 06/26/2022 07:35 AM    Anion gap 9 06/26/2022 07:35 AM    Glucose 81 06/26/2022 07:35 AM    BUN 65 (H) 06/26/2022 07:35 AM    Creatinine 12.00 (H) 06/26/2022 07:35 AM    BUN/Creatinine ratio 5 (L) 06/26/2022 07:35 AM    GFR est AA 5 (L) 06/26/2022 07:35 AM    GFR est non-AA 4 (L) 06/26/2022 07:35 AM    Calcium 7.7 (L) 06/26/2022 07:35 AM        Current Facility-Administered Medications   Medication Dose Route Frequency Provider Last Rate Last Admin    ceFAZolin (ANCEF) 2 g/20 mL in sterile water IV syringe  2 g IntraVENous Once per day on Mon Wed RishiEmily benitez MD   2 g at 06/29/22 2151    [START ON 7/1/2022] ceFAZolin (ANCEF) 3 g/30 mL in sterile water IV syringe  3 g IntraVENous every Friday Inna Blackwell MD  Saccharomyces boulardii (FLORASTOR) capsule 500 mg  500 mg Oral BID Emily Blackwell MD   500 mg at 06/30/22 1030    diphenhydrAMINE (BENADRYL) injection 25-50 mg  25-50 mg IntraVENous Rad Kaylee Whitehead MD   50 mg at 06/26/22 1356    fentaNYL citrate (PF) injection  mcg   mcg IntraVENous Rad Kaylee Whitehead MD   50 mcg at 06/26/22 1406    naloxone (NARCAN) injection 0.4 mg  0.4 mg IntraVENous PRN Amalia Whitehead MD        HYDROmorphone (DILAUDID) injection 0.5 mg  0.5 mg IntraVENous Q3H PRN Maxine Traylor PA-C   0.5 mg at 06/26/22 0856    oxyCODONE-acetaminophen (PERCOCET) 5-325 mg per tablet 1-2 Tablet  1-2 Tablet Oral Q6H PRN Maxine Traylor PA-C   2 Tablet at 06/30/22 1054    epoetin delonte-epbx (RETACRIT) injection 20,000 Units  20,000 Units SubCUTAneous Q MON, WED & FRI Mak Barreto MD   20,000 Units at 06/27/22 2238    sevelamer carbonate (RENVELA) oral powder 1,200 mg  1,200 mg Oral TID WITH MEALS José Alston MD   1,200 mg at 06/30/22 1030    ondansetron (ZOFRAN) injection 4 mg  4 mg IntraVENous Q8H PRN Katarina Campos MD        acetaminophen (TYLENOL) tablet 650 mg  650 mg Oral Q6H PRN Damian Watson MD   650 mg at 06/22/22 1417    Or    acetaminophen (TYLENOL) suppository 650 mg  650 mg Rectal Q6H PRN Damian Watson MD        guaiFENesin-dextromethorphan (ROBITUSSIN DM) 100-10 mg/5 mL syrup 5 mL  5 mL Oral Q4H PRN Damian Watson MD        sodium chloride (NS) flush 5-10 mL  5-10 mL IntraVENous PRN Carlyn Licea MD   10 mL at 06/22/22 0650    carvediloL (COREG) tablet 12.5 mg  12.5 mg Oral BID WITH MEALS José Alston MD   12.5 mg at 06/30/22 1030    calcitRIOL (ROCALTROL) capsule 0.25 mcg  0.25 mcg Oral Q MON, WED & José Deshpande MD   0.25 mcg at 06/29/22 2150    aspirin delayed-release tablet 81 mg  81 mg Oral DAILY Mario MEJIAS MD   81 mg at 06/30/22 1030    cyanocobalamin tablet 1,000 mcg  1,000 mcg Oral DAILY Mario MEJIAS MD   1,000 mcg at 06/30/22 1030    doxazosin (CARDURA) tablet 2 mg  2 mg Oral DAILY Santi Oconnor MD   2 mg at 06/30/22 1030    furosemide (LASIX) tablet 80 mg  80 mg Oral BID Mario MEJIAS MD   80 mg at 06/30/22 1030    isosorbide mononitrate ER (IMDUR) tablet 60 mg  60 mg Oral DAILY Mario EMJIAS MD   60 mg at 06/30/22 1030    pantoprazole (PROTONIX) tablet 40 mg  40 mg Oral ACB Santi Oconnor MD   40 mg at 06/30/22 0928    glucose chewable tablet 16 g  16 g Oral PRN Adriane Marino MD        glucagon (GLUCAGEN) injection 1 mg  1 mg IntraMUSCular PRN Adriane Marino MD        insulin lispro (HUMALOG) injection   SubCUTAneous AC&HS Mario MEJIAS MD   4 Units at 06/23/22 1142    dextrose 10% infusion 0-250 mL  0-250 mL IntraVENous PRN Adriane Marino MD        hydrALAZINE (APRESOLINE) 20 mg/mL injection 20 mg  20 mg IntraVENous Q6H PRN Adriane Marino MD   20 mg at 06/30/22 0644    heparin (porcine) injection 5,000 Units  5,000 Units SubCUTAneous Q8H Mario MEJIAS MD   5,000 Units at 06/30/22 1031    heparin (porcine) 1,000 unit/mL injection 3,600 Units  3,600 Units Hemodialysis DIALYSIS PRN Charleen Nieves MD   3,600 Units at 06/27/22 1345

## 2022-06-30 NOTE — PROGRESS NOTES
BP elevated through the night 172-179/74/73. HR 81-82. Patient asymptomatic. Received hydralazine 20 mg IV and monitored. Patient scheduled for dialysis tomorrow, 7/1/22. Denied pain and discomfort. 0800 - Bedside and Verbal shift change report given to [address] [place] [address] (oncoming nurse) by Estelita Mcdaniels (offgoing nurse). Report included the following information SBAR, Kardex, Intake/Output and MAR.

## 2022-06-30 NOTE — PROGRESS NOTES
Patient noted positive with COVID-19, approaching day 10 of admission. Pt has been afebrile >2 days, spO2 100% on room air. If appropriate, consider discontinuing droplet plus precautions per CDC guidelines.

## 2022-06-30 NOTE — PROGRESS NOTES
Occupational Therapy Evaluation Attempt     OT eval orders received. Chart reviewed. Attempted Occupational Therapy Evaluation, however, patient unable to be seen due to:  []  Nausea/vomiting  []  Eating  []  Pain  []  Patient too lethargic  []  Off Unit for testing/procedure  []  Dialysis treatment in progress  []  Telemetry Results  [x]  Other: pt hypotensive; SBP at 72. Will f/u later as patient's schedule allows.   Duy Sensor, OTR/L

## 2022-06-30 NOTE — PROGRESS NOTES
Comprehensive Nutrition Assessment    Type and Reason for Visit: Reassess    Nutrition Recommendations/Plan:   1. Will discontinue ONS. 2. Continue to monitor PO intake. Continue with good PO intake to meet nutritional needs. Malnutrition Assessment:  Malnutrition Status: At risk for malnutrition (specify) (06/27/22 1401)  `    Nutrition Assessment:    Patient with ESRD- HD, diabetes, CHF, HTN, s/p right BKA. Admitted with sepsis secondary to bacteremia, pulmonary edema (resolved- fluid removed with HD), COVID 19. Per pt- ate all of breakfast this AM (omelette, sausage, toast). Asked about nepro- has not been drinking because it make the pt use bathroom. States have been leaving then on desk. Will discontinue nepro per pt request. Pt reports appetite has been good. PO >50% and sometimes 100%. States will save food for later to eat if busy on dialysis. Nutrition Related Findings:    Renvela, florastor, protonix, humalog, lantus, lasix, retacrit, vit b12. BM 6/29. Wound Type: Surgical incision    Current Nutrition Intake & Therapies:  Average Meal Intake: 51-75%  Average Supplement Intake: Unable to assess  ADULT DIET Regular; Low Sodium (2 gm); Low Potassium (Less than 3000 mg/day); 60 to 80 gm; 1500 ml  ADULT ORAL NUTRITION SUPPLEMENT Breakfast; Renal Supplement    Anthropometric Measures:  Height: 5' 7\" (170.2 cm)  Ideal Body Weight (IBW): 148 lbs (67 kg)     Current Body Wt:  75.3 kg (166 lb 0.1 oz), 112.2 % IBW. Bed scale  Current BMI (kg/m2): 26  Usual Body Weight: 77.7 kg (171 lb 4.8 oz) (9/2/2021 lasr RD note)  % Weight Change (Calculated): -4.6  Weight Adjustment: Amputation  Total Amputation Percentage: 5.9  Adjusted Ideal Body Weight (lbs) (Calculated): 139.3  Adjusted Ideal Body Weight (kg) (Calculated): 63.32 kg  Adjusted % IBW (Calculated): 119.2  Adjusted BMI (Calculated): 27.5  BMI Category: Overweight (BMI 25.0-29. 9)    Estimated Daily Nutrient Needs:  Energy Requirements Based On: Kcal/kg  Weight Used for Energy Requirements: Current  Energy (kcal/day): 2260  Weight Used for Protein Requirements: Other (specify) (Protein Restriction per diet order)  Protein (g/day): 60-80  Method Used for Fluid Requirements: Other (comment) (Fluid restriction per diet order.)  Fluid (ml/day): 1500    Nutrition Diagnosis:   · Increased nutrient needs related to renal dysfunction as evidenced by dialysis    Nutrition Interventions:   Food and/or Nutrient Delivery: Continue current diet,Continue oral nutrition supplement  Nutrition Education/Counseling: No recommendations at this time  Coordination of Nutrition Care: Continue to monitor while inpatient       Goals:  Previous Goal Met: Progressing toward goal(s)  Goals: PO intake 75% or greater,by next RD assessment       Nutrition Monitoring and Evaluation:   Behavioral-Environmental Outcomes: None identified  Food/Nutrient Intake Outcomes: Diet advancement/tolerance,Food and nutrient intake,Supplement intake  Physical Signs/Symptoms Outcomes: Biochemical data,Hemodynamic status,Meal time behavior,Skin,Weight,GI status    Discharge Planning:    Continue current diet    Lila Lazaro RD

## 2022-06-30 NOTE — DIABETES MGMT
Diabetes/ Glycemic Control Plan of Care  Recommendations:   Recommend decreasing Lantus to 4 units q 24 hrs. Assessment: BG ranging  mg/dl over the last 24 hours. Patient has received no corrective insulin in the last 7 days. The highest BG in 5 days has been 116 mg/dl. Patient would benefit from a reduction in basal insulin. Recent Glucose Results:   Lab Results   Component Value Date/Time    GLUCPOC 65 (L) 06/30/2022 07:55 AM    GLUCPOC 111 (H) 06/29/2022 03:52 PM    GLUCPOC 116 (H) 06/29/2022 11:39 AM           BG within target range (non-ICU: <180; -180):  [] Yes    [x] No   Current insulin orders: 7 units Lantus, corrective Humalog  Total Daily Dose previous 24 hours = 7 units Lantus     Plan/Goals:   Blood glucose will be within target of 70 - 180 mg/dl within 72 hours  Reinforce dietary and medication compliance at home.         Education:  [] Refer to Diabetes Education Record                       [x] Education not indicated at this time     Farida Navas RD  Glycemic Control Team  [phone]    Monday-Friday   9 am - 3 pm

## 2022-06-30 NOTE — PROGRESS NOTES
RN performed wound care per orders. RN educated patient on process for patient to be able to change dressing at discharge. Patient unable to physically change dressing d/t wound location, direction of packing and physical limitations. Case management updated.

## 2022-06-30 NOTE — PROGRESS NOTES
Problem: Mobility Impaired (Adult and Pediatric)  Goal: *Acute Goals and Plan of Care (Insert Text)  Description: Physical Therapy Goals  Initiated 6/30/2022 and to be accomplished within 7 day(s)  1. Patient will move from supine to sit and sit to supine  in bed with independence. 2.  Patient will transfer from bed to chair and chair to bed with modified independence using the least restrictive device. 3.  Patient will perform sit to stand with modified independence. 4.  Patient will ambulate with modified independence for 50 feet with the least restrictive device. Note:   physical Therapy EVALUATION    Patient: Roberta Herrera (50 y.o. male)  Date: 6/30/2022  Primary Diagnosis: COVID-19 [U07.1]  Procedure(s) (LRB):  EXCISION OF INFECTED AV GRAPH LEFT UPPER ARM,TEMPORARY  DIALYSIS CATHETER PLACEMENT RIGHT LEG, REPAIR BRACIAL ARTERY WITH PATCH, HARVEST OF BASILLIC VEIN. (N/A) 6 Days Post-Op   Precautions:   Fall,WBAT    ASSESSMENT :  Based on the objective data described below, the patient presents with lower extremity weakness, decreased gait quality and endurance, impaired transfers, and decreased activity tolerance. Pt lives alone and ambulates with R LE prosthesis, rollator/RW or no AD. Pt drives and prior to admission was able to load rollator into backseat then transfer to front seat to drive. Upon entering for PT session today pt on bedside commode with prosthesis donned. Pt states he transferred to [facility] on his own. Pt reports feeling weak on bedside commode. Assisted with pericare. Pt required Ester to transition to standing and Min-ModA for 3 steps to return to bed. ModA to return to supine. BP assessed 154/76, after 2 minutes BP recorded at 174/96. After 2 minutes /78. SPO2 97% and . Pt was lethargic upon return to bed. Improved with supine rest. Griselda Canton, RN notified of patient performance. Pt highly independent prior to admission and lives alone. Pt motivated to improve function.  Pt would benefit from rehab placement at d/c. Will continue to progress mobility as pt tolerates. Patient will benefit from skilled intervention to address the above impairments. Patients rehabilitation potential is considered to be Good  Factors which may influence rehabilitation potential include:   []         None noted  []         Mental ability/status  [x]         Medical condition  [x]         Home/family situation and support systems  []         Safety awareness  []         Pain tolerance/management  []         Other:      PLAN :  Recommendations and Planned Interventions:  [x]           Bed Mobility Training             []    Neuromuscular Re-Education  [x]           Transfer Training                   []    Orthotic/Prosthetic Training  [x]           Gait Training                          []    Modalities  [x]           Therapeutic Exercises          []    Edema Management/Control  [x]           Therapeutic Activities            [x]    Patient and Family Training/Education  []           Other (comment):    Frequency/Duration: Patient will be followed by physical therapy 1-2 times per day for 3-7 days per week to address goals. Discharge Recommendations: Rehab  Further Equipment Recommendations for Discharge: N/A     SUBJECTIVE:   Patient stated I feel like I got hit by a bus.     OBJECTIVE DATA SUMMARY:     Past Medical History:   Diagnosis Date    Chronic kidney disease     Diabetes ([facility]     Heart failure ([facility]     Hemodialysis patient ([facility]     Hypertension     Sickle cell trait ([facility]      Past Surgical History:   Procedure Laterality Date    HX ORTHOPAEDIC      right BKA 2017    IR DECLOT AV GRAFT PERCUTANEOUS  6/3/2022    IR INSERT NON TUNL CVC OVER 5 YRS  6/1/2022     Barriers to Learning/Limitations: None  Compensate with: Visual Cues, Verbal Cues, and Tactile Cues  Prior Level of Function/Home Situation: Independent ambulation without AAD/RW/rollator as needed  Home Situation  Home Environment: Private residence  # Steps to Enter: 0  One/Two Story Residence: One story  Living Alone: Yes  Support Systems: Other Family Member(s)  Patient Expects to be Discharged to[de-identified] Home with home health  Current DME Used/Available at Home: flavio Sarkar  Critical Behavior:  Neurologic State: Alert; Appropriate for age  Orientation Level: Oriented X4  Psychosocial  Patient Behaviors: Calm; Cooperative  Purposeful Interaction: Yes  Pt Identified Daily Priority: Clinical issues (comment)  Caritas Process: Nurture loving kindness;Enable ludivina/hope;Establish trust;Teaching/learning; Attend basic human needs;Create healing environment  Caring Interventions: Reassure  Reassure: Caring rounds  Strength:    Strength: Generally decreased, functional  Tone & Sensation:   Tone: Normal  Sensation: Impaired  Range Of Motion:  AROM: Generally decreased, functional  Functional Mobility:  Bed Mobility:  Sit to Supine: Moderate assistance   Transfers:  Sit to Stand: Minimum assistance  Stand to Sit: Minimum assistance  Balance:   Sitting: Intact  Standing: Impaired; With support  Ambulation/Gait Training:  Distance (ft): 3 Feet (ft)  Assistive Device: Gait belt (HHA)  Ambulation - Level of Assistance: Moderate assistance   Gait Description (WDL): Exceptions to WDL  Gait Abnormalities: Decreased step clearance  Speed/Lottie: Slow  Step Length: Right shortened;Left shortened  Pain:  Pain Scale 1: Numeric (0 - 10)  Pain Intensity 1: 7  Pain Location 1: Arm  Pain Orientation 1: Left  Pain Description 1: Aching; Throbbing  Pain Intervention(s) 1: Medication (see MAR)  Activity Tolerance:   Poor  Please refer to the flowsheet for vital signs taken during this treatment.   After treatment:   []         Patient left in no apparent distress sitting up in chair  [x]         Patient left in no apparent distress in bed  [x]         Call bell left within reach  [x]         Nursing notified  []         Caregiver present  [x]         Bed alarm activated    COMMUNICATION/EDUCATION:   [x]         Fall prevention education was provided and the patient/caregiver indicated understanding. [x]         Patient/family have participated as able in goal setting and plan of care. [x]         Patient/family agree to work toward stated goals and plan of care. []         Patient understands intent and goals of therapy, but is neutral about his/her participation. []         Patient is unable to participate in goal setting and plan of care.     Thank you for this referral.  Payton Suero   Time Calculation: 30 mins    Eval Complexity: History: MEDIUM  Complexity : 1-2 comorbidities / personal factors will impact the outcome/ POC Exam:LOW Complexity : 1-2 Standardized tests and measures addressing body structure, function, activity limitation and / or participation in recreation  Presentation: LOW Complexity : Stable, uncomplicated  Clinical Decision Making:Low Complexity    Overall Complexity:LOW

## 2022-06-30 NOTE — PROGRESS NOTES
Problem: Falls - Risk of  Goal: *Absence of Falls  Description: Document Alexis Sol Fall Risk and appropriate interventions in the flowsheet. Outcome: Progressing Towards Goal  Note: Fall Risk Interventions:  Mobility Interventions: Patient to call before getting OOB         Medication Interventions: Teach patient to arise slowly,Patient to call before getting OOB    Elimination Interventions: Call light in reach,Patient to call for help with toileting needs,Toileting schedule/hourly rounds    History of Falls Interventions: Room close to nurse's station         Problem: Patient Education: Go to Patient Education Activity  Goal: Patient/Family Education  Outcome: Progressing Towards Goal     Problem: Pressure Injury - Risk of  Goal: *Prevention of pressure injury  Description: Document Tucker Scale and appropriate interventions in the flowsheet.   Outcome: Progressing Towards Goal  Note: Pressure Injury Interventions:  Sensory Interventions: Assess need for specialty bed    Moisture Interventions: Absorbent underpads,Apply protective barrier, creams and emollients,Check for incontinence Q2 hours and as needed,Minimize layers    Activity Interventions: Pressure redistribution bed/mattress(bed type)    Mobility Interventions: Pressure redistribution bed/mattress (bed type),HOB 30 degrees or less    Nutrition Interventions: Document food/fluid/supplement intake,Discuss nutritional consult with provider,Offer support with meals,snacks and hydration    Friction and Shear Interventions: Apply protective barrier, creams and emollients                Problem: Patient Education: Go to Patient Education Activity  Goal: Patient/Family Education  Outcome: Progressing Towards Goal     Problem: Airway Clearance - Ineffective  Goal: Achieve or maintain patent airway  Outcome: Progressing Towards Goal     Problem: Gas Exchange - Impaired  Goal: Absence of hypoxia  Outcome: Progressing Towards Goal  Goal: Promote optimal lung function  Outcome: Progressing Towards Goal     Problem: Breathing Pattern - Ineffective  Goal: Ability to achieve and maintain a regular respiratory rate  Outcome: Progressing Towards Goal     Problem:  Body Temperature -  Risk of, Imbalanced  Goal: Ability to maintain a body temperature within defined limits  Outcome: Progressing Towards Goal  Goal: Will regain or maintain usual level of consciousness  Outcome: Progressing Towards Goal  Goal: Complications related to the disease process, condition or treatment will be avoided or minimized  Outcome: Progressing Towards Goal     Problem: Isolation Precautions - Risk of Spread of Infection  Goal: Prevent transmission of infectious organism to others  Outcome: Progressing Towards Goal     Problem: Nutrition Deficits  Goal: Optimize nutrtional status  Outcome: Progressing Towards Goal     Problem: Risk for Fluid Volume Deficit  Goal: Maintain normal heart rhythm  Outcome: Progressing Towards Goal  Goal: Maintain absence of muscle cramping  Outcome: Progressing Towards Goal  Goal: Maintain normal serum potassium, sodium, calcium, phosphorus, and pH  Outcome: Progressing Towards Goal     Problem: Loneliness or Risk for Loneliness  Goal: Demonstrate positive use of time alone when socialization is not possible  Outcome: Progressing Towards Goal     Problem: Fatigue  Goal: Verbalize increase energy and improved vitality  Outcome: Progressing Towards Goal     Problem: Patient Education: Go to Patient Education Activity  Goal: Patient/Family Education  Outcome: Progressing Towards Goal     Problem: Chronic Renal Failure  Goal: *Fluid and electrolytes stabilized  Outcome: Progressing Towards Goal     Problem: Patient Education: Go to Patient Education Activity  Goal: Patient/Family Education  Outcome: Progressing Towards Goal     Problem: Risk for Spread of Infection  Goal: Prevent transmission of infectious organism to others  Description: Prevent the transmission of infectious organisms to other patients, staff members, and visitors.   Outcome: Progressing Towards Goal     Problem: Patient Education:  Go to Education Activity  Goal: Patient/Family Education  Outcome: Progressing Towards Goal     Problem: Patient Education: Go to Patient Education Activity  Goal: Patient/Family Education  Outcome: Progressing Towards Goal

## 2022-06-30 NOTE — PROGRESS NOTES
[facility]  (A Division of [address] [place] [address])                                                                                                                      Sylvia Edwards MD  Office #: - Option # [phone]  Fax #: [phone]     Date of Admission: 6/20/2022Date of Note: 6/30/2022  Reason for Referral: Evaluation and antibiotic management of bacteremia/COVID 19 Infection. Tomorrow- Friday July 1, Dr Melonie Ga will be covering. I am on call over weekend. Please call via  or Perfect Serve. Thanks. 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 Proximal vein part well incorporated and overswen  Right temp HD cath placed  · Asymptomatic COVID 19 Infection-- doubt viral PNA  --fully vaccinared and boosted 11/2021  --not clear when viral infection was acquired  · Pul edema Vs PNA-- had few missed HD sessions  · Occluded L AVG  · History of C.diff in the past    Other Medical Issues- Mx per respective team:  ESRD on HD  DM  R BKA  Hx of AMS with severe Hypoglycemia== recent admission      Recommendation for ID issues I am following:  ------------------------------------------------------------------------------  Wound care for L arm-per vascular  Removal of R femoral TLC -soon.  Timing per primary team    Monitor for diarrhea-promptly start CDI treatment if loose bm with increasing wbc  Cont probiotics bid      -- DC isolation in 10 days from 1st test-- he is asymptomatic    Cefazolin 2/2/3 gm post HD MWF- X6 weeks from 6/24/22. End date: August 4  --Appreciate Renal MD help with arranging ABX post HD  --CBC with Diff Q O weekly    Fax labs to Dr More Number-- [phone]     Arrange FU with me in Mid August      DW CM, pt         Subjective:  Pt seen, voices no complaint except as soon as he eats, he has BM. BM is getting more formed. We discussed about his ongoing treatment in detail- and stressed on importance of complying with HD so abx treatment would be uniformly delivered. Review of systems:      No F/C/R  No N/V  No cough/sob/chest pain  No abd pain/cramps or diarrhea  No joint swelling/pain  No itching or rash       HPI:  Floresita Bustos is a 50 y.o. BLACK/ with PMH of ESRD on HD, DM. Recently admitted to [facility] with AMS and hypoglycemia. During his stay, he had underwent vascular study for occlusion on left arm, and was not able to use it, and R TDC was placed. He had missed couple of his HD sessions and he was feeling body aches and SOB and had cough with clear mucus. He came in to ED, felt he could have PNA. Other associated symptoms include N/V and loose BM without   Had high BP to 208/101, fever to 102.1 after admission. .  Currently on Vanco and Cefepime. Reports occasional pain on L arm. No abd pain. Admission CXR with cardiomegaly w/mild pul edema. WBC at 12K, no bands           Active Hospital Problems    Diagnosis Date Noted    Sepsis ([address] 06/22/2022    Bacteremia 06/22/2022    Arteriovenous graft infection ([address] 06/22/2022    COVID-19 06/20/2022    Pulmonary edema 06/20/2022    Volume overload 06/20/2022    ESRD (end stage renal disease) on dialysis ([address] 01/29/2020     Past Medical History:   Diagnosis Date    Chronic kidney disease     Diabetes ([address]     Heart failure ([address]     Hemodialysis patient ([address]     Hypertension     Sickle cell trait ([address]      Past Surgical History:   Procedure Laterality Date    HX ORTHOPAEDIC      right BKA 2017    IR DECLOT AV GRAFT PERCUTANEOUS  6/3/2022    IR INSERT NON TUNL CVC OVER 5 YRS  6/1/2022     History reviewed. No pertinent family history. Social History     Socioeconomic History    Marital status: LEGALLY      Spouse name: Not on file    Number of children: Not on file    Years of education: Not on file    Highest education level: Not on file   Occupational History    Not on file   Tobacco Use    Smoking status: Former Smoker    Smokeless tobacco: Never Used   Substance and Sexual Activity    Alcohol use: No    Drug use: No    Sexual activity: Not on file   Other Topics Concern    Not on file   Social History Narrative    Not on file     Social Determinants of Health     Financial Resource Strain:     Difficulty of Paying Living Expenses: Not on file   Food Insecurity:     Worried About [address] in the Last Year: Not on file    [address] in the Last Year: Not on file   Transportation Needs:     Lack of Transportation (Medical): Not on file    Lack of Transportation (Non-Medical):  Not on file   Physical Activity:     Days of Exercise per Week: Not on file    Minutes of Exercise per Session: Not on file   Stress:     Feeling of Stress : Not on file   Social Connections:     Frequency of Communication with Friends and Family: Not on file    Frequency of Social Gatherings with Friends and Family: Not on file    Attends Restorationist Services: Not on file    Active Member of [address] [place] [address] or Organizations: Not on file    Attends Club or Organization Meetings: Not on file    Marital Status: Not on file   Intimate Partner Violence:     Fear of Current or Ex-Partner: Not on file    Emotionally Abused: Not on file    Physically Abused: Not on file    Sexually Abused: Not on file   Housing Stability:     Unable to Pay for Housing in the Last Year: Not on file    Number of [place] in the Last Year: Not on file    Unstable Housing in the Last Year: Not on file       Allergies:  Penicillins and Vancomycin     Medications:  Current Facility-Administered Medications   Medication Dose Route Frequency    ceFAZolin (ANCEF) 2 g/20 mL in sterile water IV syringe  2 g IntraVENous Once per day on Mon Wed    [START ON 7/1/2022] ceFAZolin (ANCEF) 3 g/30 mL in sterile water IV syringe  3 g IntraVENous every Friday    Saccharomyces boulardii (FLORASTOR) capsule 500 mg  500 mg Oral BID    diphenhydrAMINE (BENADRYL) injection 25-50 mg  25-50 mg IntraVENous Rad Multiple    fentaNYL citrate (PF) injection  mcg   mcg IntraVENous Rad Multiple    naloxone (NARCAN) injection 0.4 mg  0.4 mg IntraVENous PRN    HYDROmorphone (DILAUDID) injection 0.5 mg  0.5 mg IntraVENous Q3H PRN    oxyCODONE-acetaminophen (PERCOCET) 5-325 mg per tablet 1-2 Tablet  1-2 Tablet Oral Q6H PRN    insulin glargine (LANTUS) injection 7 Units  7 Units SubCUTAneous QHS    epoetin delonte-epbx (RETACRIT) injection 20,000 Units  20,000 Units SubCUTAneous Q MON, WED & FRI    sevelamer carbonate (RENVELA) oral powder 1,200 mg  1,200 mg Oral TID WITH MEALS    ondansetron (ZOFRAN) injection 4 mg  4 mg IntraVENous Q8H PRN    acetaminophen (TYLENOL) tablet 650 mg  650 mg Oral Q6H PRN    Or    acetaminophen (TYLENOL) suppository 650 mg  650 mg Rectal Q6H PRN    guaiFENesin-dextromethorphan (ROBITUSSIN DM) 100-10 mg/5 mL syrup 5 mL  5 mL Oral Q4H PRN    sodium chloride (NS) flush 5-10 mL  5-10 mL IntraVENous PRN    carvediloL (COREG) tablet 12.5 mg  12.5 mg Oral BID WITH MEALS    calcitRIOL (ROCALTROL) capsule 0.25 mcg  0.25 mcg Oral Q MON, WED & FRI    aspirin delayed-release tablet 81 mg  81 mg Oral DAILY    cyanocobalamin tablet 1,000 mcg  1,000 mcg Oral DAILY    doxazosin (CARDURA) tablet 2 mg  2 mg Oral DAILY    furosemide (LASIX) tablet 80 mg  80 mg Oral BID    isosorbide mononitrate ER (IMDUR) tablet 60 mg  60 mg Oral DAILY    pantoprazole (PROTONIX) tablet 40 mg  40 mg Oral ACB    glucose chewable tablet 16 g  16 g Oral PRN    glucagon (GLUCAGEN) injection 1 mg  1 mg IntraMUSCular PRN    insulin lispro (HUMALOG) injection   SubCUTAneous AC&HS    dextrose 10% infusion 0-250 mL  0-250 mL IntraVENous PRN    hydrALAZINE (APRESOLINE) 20 mg/mL injection 20 mg  20 mg IntraVENous Q6H PRN    heparin (porcine) injection 5,000 Units  5,000 Units SubCUTAneous Q8H    heparin (porcine) 1,000 unit/mL injection 3,600 Units  3,600 Units Hemodialysis DIALYSIS PRN      Physical Exam:    Temp (24hrs), Av °F (36.7 °C), Min:97.4 °F (36.3 °C), Max:98.4 °F (36.9 °C)    Visit Vitals  BP (!) 150/97   Pulse 83   Temp 98.3 °F (36.8 °C)   Resp 16   Ht 5' 7\" (1.702 m)   Wt 75.3 kg (166 lb)   SpO2 100%   BMI 26.00 kg/m²     R chest  TDC in place--removed   New TDC placed   R groin-- HD cath in place, dressing over the line still in place     GEN: WD Chronically ill looking - on 2L NC  not in resp distress. HEENT: Unicteric. EOMI intact  No neck swelling  CHEST: Non laboured breathing. CVS:RRR, no mur/gallop  ABD: Obese/soft. Non tender. Non distended  Skin: Dry and intact. No rash, no redness. CNS: CN grossly ok.       Microbiology  All Micro Results     Procedure Component Value Units Date/Time    CULTURE, BLOOD [[id number]] Collected: 22 1705    Order Status: Completed Specimen: Blood Updated: 22     Special Requests: NO SPECIAL REQUESTS        Culture result: NO GROWTH AFTER 14 HOURS       CULTURE, BLOOD [[id number]] Collected: 22 0726    Order Status: Completed Specimen: Blood Updated: 22     Special Requests: NO SPECIAL REQUESTS        Culture result: NO GROWTH 4 DAYS       CULTURE, BLOOD [[id number]] Collected: 22 1215    Order Status: Completed Specimen: Blood Updated: 22     Special Requests: NO SPECIAL REQUESTS        Culture result: NO GROWTH 5 DAYS       CULTURE, BLOOD [[id number]] Collected: 22 0100    Order Status: Completed Specimen: Blood Updated: 22 0738     Special Requests: NO SPECIAL REQUESTS        Culture result: NO GROWTH 6 DAYS       COVID-19 RAPID TEST [[id number]]  (Abnormal) Collected: 06/29/22 1842    Order Status: Completed Specimen: Nasopharyngeal Updated: 06/29/22 1952     Specimen source Nasopharyngeal        COVID-19 rapid test Detected        Comment:      The specimen is POSITIVE for SARS-CoV-2, the novel coronavirus associated with COVID-19. This test has been authorized by the FDA under an Emergency Use Authorization (EUA) for use by authorized laboratories.         Fact sheet for Healthcare Providers: [employer]rew[employer].nz  Fact sheet for Patients: [employer]nz       Methodology: Isothermal Nucleic Acid Amplification  CALLED TO AND CORRECTLY REPEATED BY:  FRANCO MURPHY RN 3N TO [facility] AT 2048 ON [id number]         CULTURE, Clemon [place] STAIN [[id number]]  (Abnormal)  (Susceptibility) Collected: 06/24/22 1930    Order Status: Completed Specimen: Wound from Arm Updated: 06/28/22 0913     Special Requests: NO SPECIAL REQUESTS        GRAM STAIN RARE WBCS SEEN         NO ORGANISMS SEEN        Culture result:       SCANT STAPHYLOCOCCUS AUREUS          CULTURE, WOUND Margurite Bill STAIN [[id number]]  (Abnormal)  (Susceptibility) Collected: 06/22/22 1505    Order Status: Completed Specimen: Catheter Tip Updated: 06/25/22 0844     Special Requests: NO SPECIAL REQUESTS        Culture result:       COLONY COUNT =>100 COLONIES STAPHYLOCOCCUS AUREUS          CULTURE, ANAEROBIC [[id number]] Collected: 06/24/22 1930    Order Status: Canceled Specimen: Surgical Specimen     CULTURE, Clemon [place] STAIN [[id number]]  (Abnormal)  (Susceptibility) Collected: 06/22/22 0915    Order Status: Completed Specimen: Wound from Abscess Updated: 06/24/22 0856     Special Requests: NO SPECIAL REQUESTS        GRAM STAIN OCCASIONAL WBCS SEEN               RARE GRAM POSITIVE COCCI IN PAIRS           Culture result:       HEAVY STAPHYLOCOCCUS AUREUS C. DIFFICILE AG & TOXIN A/B [[id number]]  (Abnormal) Collected: 06/22/22 1945    Order Status: Completed Specimen: Stool from Miscellaneous sample Updated: 06/23/22 1410     PCR Reflex       See Reflex order for C. difficile (DNA)           INTERPRETATION       Indeterminate for Toxigenic C. difficile, DNA confirmation to follow. Karlie Evans DIFF MOLECULAR [[id number]] Collected: 06/22/22 1945    Order Status: Completed Specimen: Miscellaneous sample Updated: 06/23/22 1410     C Diff Molecular Negative        Comment: This specimen is negative for toxigenic C difficile by DNA amplification. Repeat testing is not recommended for confirmation, samples received within 7 days of this negative result will be rejected. CULTURE, BLOOD [[id number]]  (Abnormal) Collected: 06/21/22 0244    Order Status: Completed Specimen: Blood Updated: 06/23/22 1336     Special Requests: NO SPECIAL REQUESTS        GRAM STAIN       GRAM POSITIVE COCCI IN CLUSTERS ANAEROBIC BOTTLE                  SMEAR CALLED TO AND CORRECTLY REPEATED BY: Moy Lott RN 3N, TO [facility] AT 1847 6/21/2022           Culture result:       STAPHYLOCOCCUS AUREUS GROWING IN BOTH BOTTLES DRAWN No Site Indicated REFER TO [id number] FOR SENSITIVITIES          CULTURE, BLOOD [[id number]]  (Abnormal) Collected: 06/21/22 0253    Order Status: Completed Specimen: Blood Updated: 06/23/22 1335     Special Requests: NO SPECIAL REQUESTS        GRAM STAIN       GRAM POSITIVE COCCI IN CLUSTERS ANAEROBIC BOTTLE                  SMEAR CALLED TO AND CORRECTLY REPEATED BY: VICTORIANO PEDERSEN RN 3N, TO [facility] AT 1847 6/21/2022                  GRAM POSITIVE COCCI IN CLUSTERS AEROBIC BOTTLE                  SMEAR CALLED TO AND CORRECTLY REPEATED BY: GUERO RODRIGUEZ RN 3N AT 8586 ON 6/22/22 TO [facility].            Culture result:       STAPHYLOCOCCUS AUREUS GROWING IN BOTH BOTTLES DRAWN No Site Indicated REFER TO [id number] FOR SENSITIVITIES          CULTURE, BLOOD [[id number]]  (Abnormal) Collected: 06/20/22 0915    Order Status: Completed Specimen: Blood Updated: 06/23/22 0944     Special Requests: NO SPECIAL REQUESTS        GRAM STAIN       ANAEROBIC BOTTLE GRAM POSITIVE COCCI IN CLUSTERS                  SMEAR CALLED TO AND CORRECTLY REPEATED BY: DAVIS Ko RN 3N ON 6/21/22 AT 0531 TO [facility]. GRAM POSITIVE COCCI IN CLUSTERS AEROBIC BOTTLE                  SMEAR CALLED TO AND CORRECTLY REPEATED BY: ELISA TOLLIVER RN 3N TO [address] [employer] [facility] AT 2077 ON 6/21/22. Culture result:       STAPHYLOCOCCUS AUREUS GROWING IN BOTH BOTTLES DRAWN SITE= RT ARM REFER TO [id number] FOR SENSITIVITIES          CULTURE, BLOOD [[id number]]  (Abnormal)  (Susceptibility) Collected: 06/20/22 0940    Order Status: Completed Specimen: Blood Updated: 06/23/22 0943     Special Requests: NO SPECIAL REQUESTS        GRAM STAIN       ANAEROBIC BOTTLE GRAM POSITIVE COCCI IN CLUSTERS                  SMEAR CALLED TO AND CORRECTLY REPEATED BY: DAVIS Ko RN 3N ON 6/21/22 AT 0531 TO [facility].                   GRAM POSITIVE COCCI IN CLUSTERS AEROBIC BOTTLE                  SMEAR CALLED TO AND CORRECTLY REPEATED BY: JOLENE Sage And Tommy Morrow, RN 3N, TO [facility] AT 1441 6/21/2022           Culture result:       STAPHYLOCOCCUS AUREUS GROWING IN BOTH BOTTLES DRAWN SITE = RARM          CULTURE, CATHETER TIP [[id number]] Collected: 06/22/22 1459    Order Status: Canceled Specimen: CVP Catheter Tip     BLOOD CULTURE ID PANEL [[id number]]  (Abnormal) Collected: 06/20/22 0940    Order Status: Completed Specimen: Blood Updated: 06/22/22 0031     Acc. no. from Micro Order [id number]     Enterococcus faecalis Not detected        Enterococcus faecium Not detected        Listeria monocytogenes Not detected        Staphylococcus Detected        Staphylococcus aureus Detected        Staph epidermidis Not detected        Staph lugdunensis Not detected        Streptococcus Not detected        Streptococcus agalactiae (Group B) Not detected        Streptococcus pneumoniae Not detected        Streptococcus pyogenes (Group A) Not detected        Acinetobacter calcoaceticus-baumanii complex Not detected        Bacteroides fragilis Not detected        Enterobacterales species Not detected        Enterobacter cloacae complex Not detected        Escherichia coli Not detected        Klebsiella aerogenes Not detected        Klebsiella oxytoca Not detected        Klebsiella pneumoniae group Not detected        Proteus Not detected        Salmonella Not detected        Serratia marcescens Not detected        Haemophilus influenzae Not detected        Neisseria meningitidis Not detected        Pseudomonas aeruginosa Not detected        Steno maltophilia Not detected        Candida albicans Not detected        Candida auris Not detected        Candida glabrata Not detected        Candida krusei Not detected        Candida parapsilosis Not detected        Candida tropicalis Not detected        Crypto neoformans/gattii Not detected        RESISTANT GENES:            mecA/C and MREJ (Methicillin resistant gene) Not detected        Comment       False positive results may rarely occur. Correlate with clinical,epidemiologic, and other laboratory findings           Comment: Please see BCID Interpretation Guide in EPIC Links       LEGIONELLA PNEUMOPHILA AG, URINE [[id number]] Collected: 06/21/22 0630    Order Status: Canceled Specimen: Urine     Natali Suárez, URINE [[id number]] Collected: 06/21/22 0630    Order Status: Canceled Specimen: Urine     CULTURE, RESPIRATORY/SPUTUM/BRONCH Sharlot Sang [[id number]] Collected: 06/21/22 0630    Order Status: Canceled Specimen: Sputum     COVID-19 RAPID TEST [[id number]]  (Abnormal) Collected: 06/20/22 0925    Order Status: Completed Specimen: Nasopharyngeal Updated: 06/20/22 1013     Specimen source Nasopharyngeal        COVID-19 rapid test Detected        Comment:      The specimen is POSITIVE for SARS-CoV-2, the novel coronavirus associated with COVID-19. This test has been authorized by the FDA under an Emergency Use Authorization (EUA) for use by authorized laboratories. Fact sheet for Healthcare Providers: ConventionUpdate.co.nz  Fact sheet for Patients: ConventionUpdate.co.nz       Methodology: Isothermal Nucleic Acid Amplification  CALLED TO AND CORRECTLY REPEATED BY:  DR TIAN ED 6/20/22 AT 1013 TO [facility]         INFLUENZA A & B AG (RAPID TEST) [[id number]] Collected: 06/20/22 0925    Order Status: Completed Specimen: Nasopharyngeal from Nasal washing Updated: 06/20/22 1012     Influenza A Antigen Negative        Comment: A negative result does not exclude influenza virus infection, seasonal or H1N1 due to suboptimal sensitivity. If influenza is circulating in your community, a diagnosis of influenza should be considered based on a patients clinical presentation and empiric antiviral treatment should be considered, if indicated. Influenza B Antigen Negative              Lab results:    Chemistry  Recent Labs     06/29/22  1706   *   TP 7.1   ALB 2.3*   GLOB 4.8*   AGRAT 0.5*       CBC w/ Diff  Recent Labs     06/29/22  1706   WBC 8.7   RBC 2.97*   HGB 7.6*   HCT 23.5*      GRANS 74*   LYMPH 10*   EOS 3       Imaging: report reviewed and as posted by radiologist   Results from [place] encounter on 06/01/22    CT HEAD WO CONT    Narrative  EXAM: CT head    INDICATION: Confusion. COMPARISON: 8/29/2021    TECHNIQUE: Axial CT imaging of the head was performed without intravenous  contrast. Standard multiplanar coronal and sagittal reformatted images were  obtained and are included in interpretation. One or more dose reduction techniques were used on this CT: automated exposure  control, adjustment of the mAs and/or kVp according to patient size, and  iterative reconstruction techniques.   The specific techniques used on this CT  exam have been documented in the patient's electronic medical record. Digital  Imaging and Communications in Medicine (DICOM) format image data are available  to nonaffiliated external healthcare facilities or entities on a secure, media  free, reciprocally searchable basis with patient authorization for at least a  12-month period after this study. _______________    FINDINGS:    BRAIN AND POSTERIOR FOSSA: Right central arden hypodensity, new from prior study. No evidence of acute large vessel transcortical infarct or acute parenchymal  hemorrhage. No midline shift or hydrocephalus. EXTRA-AXIAL SPACES AND MENINGES: There are no abnormal extra-axial fluid  collections. CALVARIUM: Intact. SINUSES: Clear. OTHER: None.    _______________    Impression  Right central arden hypodensity, new from prior study, age indeterminate infarct. Correlate clinically.

## 2022-06-30 NOTE — PROGRESS NOTES
D/C Plan Discharge to acute rehab pending acceptance and bed availability. If patient is not accepted, patient will go home with [facility] and daily dressing changes with ABX at dialysis. CM left vm with [facility], awaiting callback. His diagnosis will not qualify him for [facility]. MILAD spoke with Kaitlin Lam from [facility] concerning IV ABX with dialysis. MILAD sent the latest ID note and an order with the ABX instructions to [facility].

## 2022-07-01 LAB
BACTERIA SPEC CULT: NORMAL
GLUCOSE BLD STRIP.AUTO-MCNC: 133 MG/DL (ref 70–110)
GLUCOSE BLD STRIP.AUTO-MCNC: 75 MG/DL (ref 70–110)
GLUCOSE BLD STRIP.AUTO-MCNC: 78 MG/DL (ref 70–110)
GLUCOSE BLD STRIP.AUTO-MCNC: 97 MG/DL (ref 70–110)
SERVICE CMNT-IMP: NORMAL

## 2022-07-01 PROCEDURE — 90935 HEMODIALYSIS ONE EVALUATION: CPT

## 2022-07-01 PROCEDURE — 36415 COLL VENOUS BLD VENIPUNCTURE: CPT

## 2022-07-01 PROCEDURE — 97530 THERAPEUTIC ACTIVITIES: CPT

## 2022-07-01 PROCEDURE — 74011250637 HC RX REV CODE- 250/637: Performed by: STUDENT IN AN ORGANIZED HEALTH CARE EDUCATION/TRAINING PROGRAM

## 2022-07-01 PROCEDURE — 74011000250 HC RX REV CODE- 250: Performed by: INTERNAL MEDICINE

## 2022-07-01 PROCEDURE — 65270000046 HC RM TELEMETRY

## 2022-07-01 PROCEDURE — 97166 OT EVAL MOD COMPLEX 45 MIN: CPT

## 2022-07-01 PROCEDURE — 74011250636 HC RX REV CODE- 250/636: Performed by: HOSPITALIST

## 2022-07-01 PROCEDURE — 82962 GLUCOSE BLOOD TEST: CPT

## 2022-07-01 PROCEDURE — 74011250637 HC RX REV CODE- 250/637: Performed by: INTERNAL MEDICINE

## 2022-07-01 PROCEDURE — 74011250636 HC RX REV CODE- 250/636: Performed by: STUDENT IN AN ORGANIZED HEALTH CARE EDUCATION/TRAINING PROGRAM

## 2022-07-01 PROCEDURE — 74011250637 HC RX REV CODE- 250/637: Performed by: HOSPITALIST

## 2022-07-01 PROCEDURE — 87040 BLOOD CULTURE FOR BACTERIA: CPT

## 2022-07-01 PROCEDURE — 97116 GAIT TRAINING THERAPY: CPT

## 2022-07-01 PROCEDURE — 74011250636 HC RX REV CODE- 250/636: Performed by: INTERNAL MEDICINE

## 2022-07-01 RX ADMIN — CALCITRIOL CAPSULES 0.25 MCG 0.25 MCG: 0.25 CAPSULE ORAL at 22:52

## 2022-07-01 RX ADMIN — EPOETIN ALFA-EPBX 20000 UNITS: 20000 INJECTION, SOLUTION INTRAVENOUS; SUBCUTANEOUS at 22:50

## 2022-07-01 RX ADMIN — HEPARIN SODIUM 5000 UNITS: 5000 INJECTION INTRAVENOUS; SUBCUTANEOUS at 02:17

## 2022-07-01 RX ADMIN — FUROSEMIDE 80 MG: 40 TABLET ORAL at 22:51

## 2022-07-01 RX ADMIN — Medication 500 MG: at 22:52

## 2022-07-01 RX ADMIN — HEPARIN SODIUM 3600 UNITS: 1000 INJECTION INTRAVENOUS; SUBCUTANEOUS at 22:32

## 2022-07-01 RX ADMIN — CEFAZOLIN 3 G: 10 INJECTION, POWDER, FOR SOLUTION INTRAVENOUS at 22:51

## 2022-07-01 NOTE — PROGRESS NOTES
Problem: Falls - Risk of  Goal: *Absence of Falls  Description: Document Cedric Melendez Fall Risk and appropriate interventions in the flowsheet. Outcome: Progressing Towards Goal  Note: Fall Risk Interventions:  Mobility Interventions: Utilize walker, cane, or other assistive device,Patient to call before getting OOB,Bed/chair exit alarm         Medication Interventions: Teach patient to arise slowly,Patient to call before getting OOB,Bed/chair exit alarm    Elimination Interventions: Bed/chair exit alarm,Call light in reach,Patient to call for help with toileting needs,Toileting schedule/hourly rounds    History of Falls Interventions: Bed/chair exit alarm         Problem: Patient Education: Go to Patient Education Activity  Goal: Patient/Family Education  Outcome: Progressing Towards Goal     Problem: Pressure Injury - Risk of  Goal: *Prevention of pressure injury  Description: Document Tucker Scale and appropriate interventions in the flowsheet.   Outcome: Progressing Towards Goal  Note: Pressure Injury Interventions:  Sensory Interventions: Assess need for specialty bed    Moisture Interventions: Minimize layers,Limit adult briefs,Check for incontinence Q2 hours and as needed,Apply protective barrier, creams and emollients,Absorbent underpads    Activity Interventions: Pressure redistribution bed/mattress(bed type)    Mobility Interventions: Float heels,Pressure redistribution bed/mattress (bed type)    Nutrition Interventions: Offer support with meals,snacks and hydration,Document food/fluid/supplement intake,Discuss nutritional consult with provider    Friction and Shear Interventions: Apply protective barrier, creams and emollients,Foam dressings/transparent film/skin sealants,HOB 30 degrees or less,Transferring/repositioning devices                Problem: Patient Education: Go to Patient Education Activity  Goal: Patient/Family Education  Outcome: Progressing Towards Goal     Problem: Airway Clearance - Ineffective  Goal: Achieve or maintain patent airway  Outcome: Progressing Towards Goal     Problem: Gas Exchange - Impaired  Goal: Absence of hypoxia  Outcome: Progressing Towards Goal  Goal: Promote optimal lung function  Outcome: Progressing Towards Goal     Problem: Breathing Pattern - Ineffective  Goal: Ability to achieve and maintain a regular respiratory rate  Outcome: Progressing Towards Goal     Problem:  Body Temperature -  Risk of, Imbalanced  Goal: Ability to maintain a body temperature within defined limits  Outcome: Progressing Towards Goal  Goal: Will regain or maintain usual level of consciousness  Outcome: Progressing Towards Goal  Goal: Complications related to the disease process, condition or treatment will be avoided or minimized  Outcome: Progressing Towards Goal     Problem: Isolation Precautions - Risk of Spread of Infection  Goal: Prevent transmission of infectious organism to others  Outcome: Progressing Towards Goal     Problem: Nutrition Deficits  Goal: Optimize nutrtional status  Outcome: Progressing Towards Goal     Problem: Risk for Fluid Volume Deficit  Goal: Maintain normal heart rhythm  Outcome: Progressing Towards Goal     Problem: Risk for Fluid Volume Deficit  Goal: Maintain normal heart rhythm  Outcome: Progressing Towards Goal  Goal: Maintain absence of muscle cramping  Outcome: Progressing Towards Goal  Goal: Maintain normal serum potassium, sodium, calcium, phosphorus, and pH  Outcome: Progressing Towards Goal     Problem: Loneliness or Risk for Loneliness  Goal: Demonstrate positive use of time alone when socialization is not possible  Outcome: Progressing Towards Goal     Problem: Fatigue  Goal: Verbalize increase energy and improved vitality  Outcome: Progressing Towards Goal     Problem: Patient Education: Go to Patient Education Activity  Goal: Patient/Family Education  Outcome: Progressing Towards Goal     Problem: Chronic Renal Failure  Goal: *Fluid and electrolytes stabilized  Outcome: Progressing Towards Goal     Problem: Patient Education: Go to Patient Education Activity  Goal: Patient/Family Education  Outcome: Progressing Towards Goal     Problem: Risk for Spread of Infection  Goal: Prevent transmission of infectious organism to others  Description: Prevent the transmission of infectious organisms to other patients, staff members, and visitors.   Outcome: Progressing Towards Goal     Problem: Patient Education:  Go to Education Activity  Goal: Patient/Family Education  Outcome: Progressing Towards Goal     Problem: Patient Education: Go to Patient Education Activity  Goal: Patient/Family Education  Outcome: Progressing Towards Goal

## 2022-07-01 NOTE — PROGRESS NOTES
D/C plan: Discharge to [facility] when medically appropriate, no accepting facilities at this time    CM spoke with patient's Medicaid transport and arranged trasnport to [facility] for Monday at 12:40 PM. If patient gets rehab placement CM will call to change the origin of transport. CM spoke with physician on rounds, per physician, patient has a positive blood culture and will need to remain for another week to 10 days. CM to continue to follow and remain available. Care Management Interventions  PCP Verified by CM: Yes (Dr. Lizzie Serrano)  Palliative Care Criteria Met (RRAT>21 & CHF Dx)?: Yes  Palliative Consult Recommended?: Yes  Mode of Transport at Discharge:  Other (see comment) (Medicaid transport. )  Transition of Care Consult (CM Consult): Home Health (Pt is in agreement w/ [facility] at d/c. )  [address]: Yes  Discharge Durable Medical Equipment: No  Physical Therapy Consult: Yes  Occupational Therapy Consult: Yes  Speech Therapy Consult: No  Support Systems: Other Family Member(s)  Confirm Follow Up Transport: Other (see comment) (Medicaid transport. )  The Plan for Transition of Care is Related to the Following Treatment Goals : [facility]  The Patient and/or Patient Representative was Provided with a Choice of Provider and Agrees with the Discharge Plan?: Yes (The pt is in agreement w/ the d/c plan. )  Freedom of Choice List was Provided with Basic Dialogue that Supports the Patient's Individualized Plan of Care/Goals, Treatment Preferences and Shares the Quality Data Associated with the Providers?: Yes ([address] [place] [address])  Discharge Location  Patient Expects to be Discharged to[de-identified] Skilled nursing facility

## 2022-07-01 NOTE — PROGRESS NOTES
Problem: Self Care Deficits Care Plan (Adult)  Goal: *Acute Goals and Plan of Care (Insert Text)  Description: Occupational Therapy Goals  Initiated 7/1/2022 within 7 day(s). 1.  Patient will perform grooming with supervision/set-up standing at sink for 5 minutes or more. 2.  Patient will perform lower body dressing with supervision/set-up. 3.  Patient will perform toilet transfers with supervision/set-up. 4.  Patient will perform all aspects of toileting with supervision/set-up. 5.  Patient will participate in upper extremity therapeutic exercise/activities with supervision/set-up for 10 minutes. 6.  Patient will utilize energy conservation techniques during functional activities with verbal, visual, and tactile cues. OCCUPATIONAL THERAPY EVALUATION    Patient: Caryle Porto (52 y.o. male)  Date: 7/1/2022  Primary Diagnosis: COVID-19 [U07.1]  Procedure(s) (LRB):  EXCISION OF INFECTED AV GRAPH LEFT UPPER ARM,TEMPORARY  DIALYSIS CATHETER PLACEMENT RIGHT LEG, REPAIR BRACIAL ARTERY WITH PATCH, HARVEST OF BASILLIC VEIN. (N/A) 7 Days Post-Op   Precautions:   Fall,WBAT  PLOF: mod I for ADLs and transfers     ASSESSMENT :  Based on the objective data described below, the patient presents with decreased strength, endurance and balance for carryover of ADLs and transfers following above mentioned medical diagnosis. Pt seen in full PPE. Pt presented midway transferring from bed to chair at the beginning of session. Pt performed sit<>stand transfers, and bed mobility at the end of session. Pt reports poor endurance and fatigues easily with minimum exertion. Pt education on EC techniques and importance of increased OOB times to improve endurance and strength for safe return to PLOF. Pt verbalized understanding for the same. Pt was left supine in bed at the end of session in NAD. Patient will benefit from skilled intervention to address the above impairments.   Patient's rehabilitation potential is considered to be Good  Factors which may influence rehabilitation potential include:   []             None noted  []             Mental ability/status  [x]             Medical condition  []             Home/family situation and support systems  []             Safety awareness  []             Pain tolerance/management  []             Other:      PLAN :  Recommendations and Planned Interventions:   [x]               Self Care Training                  [x]      Therapeutic Activities  [x]               Functional Mobility Training   []      Cognitive Retraining  [x]               Therapeutic Exercises           [x]      Endurance Activities  [x]               Balance Training                    []      Neuromuscular Re-Education  []               Visual/Perceptual Training     [x]      Home Safety Training  [x]               Patient Education                   [x]      Family Training/Education  []               Other (comment):    Frequency/Duration: Patient will be followed by occupational therapy 1-2 times per day/4-7 days per week to address goals. Discharge Recommendations: Rehab vs Home Health  Further Equipment Recommendations for Discharge: N/A     SUBJECTIVE:   Patient stated  I get tired very easily.     OBJECTIVE DATA SUMMARY:     Past Medical History:   Diagnosis Date    Chronic kidney disease     Diabetes ([facility] [address]     Heart failure ([facility] [address]     Hemodialysis patient ([facility] [address]     Hypertension     Sickle cell trait ([facility] [address]      Past Surgical History:   Procedure Laterality Date    HX ORTHOPAEDIC      right BKA 2017    IR DECLOT AV GRAFT PERCUTANEOUS  6/3/2022    IR INSERT NON TUNL CVC OVER 5 YRS  6/1/2022     Barriers to Learning/Limitations: None  Compensate with: visual, verbal, tactile, kinesthetic cues/model    Home Situation:   Home Situation  Home Environment: Private residence  # Steps to Enter: 0  One/Two Story Residence: One story  Living Alone: Yes  Support Systems: Other Family Member(s)  Patient Expects to be Discharged to[de-identified] Home  Current DME Used/Available at Home: Joe Mirta, rollator,Walker, rolling  Tub or Shower Type: Tub/Shower combination  []  Right hand dominant   []  Left hand dominant    Cognitive/Behavioral Status:  Neurologic State: Alert  Orientation Level: Oriented X4  Cognition: Appropriate for age attention/concentration; Follows commands  Safety/Judgement: Fall prevention    Skin: intact  Edema: none    Vision/Perceptual:    Tracking: Able to track stimulus in all quadrants w/o difficulty    Coordination: BUE  Coordination: Within functional limits  Fine Motor Skills-Upper: Left Intact; Right Intact    Gross Motor Skills-Upper: Left Intact; Right Intact  Balance:  Sitting: Intact  Standing: Intact; With support  Strength: BUE  Strength: Generally decreased, functional  Tone & Sensation: BUE  Tone: Normal  Sensation: Intact  Range of Motion: BUE  AROM: Generally decreased, functional  Functional Mobility and Transfers for ADLs:  Bed Mobility:  Supine to Sit: Contact guard assistance  Sit to Supine: Contact guard assistance  Scooting: Contact guard assistance  Transfers:  Sit to Stand: Minimum assistance;Contact guard assistance  Stand to Sit: Contact guard assistance   Toilet Transfer : Stand-by assistance;Contact guard assistance (bsc-->bed)  ADL Assessment:   Feeding: Independent    Oral Facial Hygiene/Grooming: Contact guard assistance    Upper Body Dressing: Contact guard assistance    Lower Body Dressing: Contact guard assistance    Toileting: Contact guard assistance  ADL Intervention:  Toileting  Toileting Assistance: Contact guard assistance  Bladder Hygiene: Contact guard assistance  Bowel Hygiene: Contact guard assistance  Clothing Management: Contact guard assistance    Cognitive Retraining  Safety/Judgement: Fall prevention  Pain:  Pain level pre-treatment: 0/10   Pain level post-treatment: 0/10   Pain Intervention(s): Medication (see MAR);  Rest, Ice, Repositioning   Response to intervention: Nurse notified, See doc flow    Activity Tolerance:   Good     Please refer to the flowsheet for vital signs taken during this treatment. After treatment:   [] Patient left in no apparent distress sitting up in chair  [x] Patient left in no apparent distress in bed  [x] Call bell left within reach  [x] Nursing notified  [] Caregiver present  [] Bed alarm activated    COMMUNICATION/EDUCATION:   [x] Role of Occupational Therapy in the acute care setting  [x] Home safety education was provided and the patient/caregiver indicated understanding. [x] Patient/family have participated as able in goal setting and plan of care. [x] Patient/family agree to work toward stated goals and plan of care. [] Patient understands intent and goals of therapy, but is neutral about his/her participation. [] Patient is unable to participate in goal setting and plan of care. Thank you for this referral.  Emmie Nayak OTR/L  Time Calculation: 24 mins    Eval Complexity: History: LOW Complexity : Brief history review ; Examination: LOW Complexity : 1-3 performance deficits relating to physical, cognitive , or psychosocial skils that result in activity limitations and / or participation restrictions ;    Decision Making:LOW Complexity : No comorbidities that affect functional and no verbal or physical assistance needed to complete eval tasks

## 2022-07-01 NOTE — ROUTINE PROCESS
Bedside and Verbal shift change report given to Sultana Obrien RN  (oncoming nurse) by Oracio Loza RN  (offgoing nurse). Report given with SBAR, Kardex, Intake/Output and Recent Results.

## 2022-07-01 NOTE — PROGRESS NOTES
Dr. Jones Poe and Rena Armas, RN notified of positive blood culture aerobic bottle collected 6/30/22 at [phone], blood culture is gram positive cocci in clusters.

## 2022-07-01 NOTE — PROGRESS NOTES
Problem: Falls - Risk of  Goal: *Absence of Falls  Description: Document Monge Reason Fall Risk and appropriate interventions in the flowsheet. Outcome: Progressing Towards Goal  Note: Fall Risk Interventions:  Mobility Interventions: Patient to call before getting OOB         Medication Interventions: Teach patient to arise slowly,Patient to call before getting OOB    Elimination Interventions: Call light in reach,Patient to call for help with toileting needs,Toileting schedule/hourly rounds    History of Falls Interventions: Room close to nurse's station         Problem: Patient Education: Go to Patient Education Activity  Goal: Patient/Family Education  Outcome: Progressing Towards Goal     Problem: Pressure Injury - Risk of  Goal: *Prevention of pressure injury  Description: Document Tucker Scale and appropriate interventions in the flowsheet.   Outcome: Progressing Towards Goal  Note: Pressure Injury Interventions:  Sensory Interventions: Assess need for specialty bed    Moisture Interventions: Absorbent underpads,Apply protective barrier, creams and emollients,Check for incontinence Q2 hours and as needed,Minimize layers    Activity Interventions: Pressure redistribution bed/mattress(bed type)    Mobility Interventions: Pressure redistribution bed/mattress (bed type),HOB 30 degrees or less    Nutrition Interventions: Document food/fluid/supplement intake,Discuss nutritional consult with provider,Offer support with meals,snacks and hydration    Friction and Shear Interventions: Apply protective barrier, creams and emollients                Problem: Patient Education: Go to Patient Education Activity  Goal: Patient/Family Education  Outcome: Progressing Towards Goal     Problem: Airway Clearance - Ineffective  Goal: Achieve or maintain patent airway  Outcome: Progressing Towards Goal     Problem: Gas Exchange - Impaired  Goal: Absence of hypoxia  Outcome: Progressing Towards Goal  Goal: Promote optimal lung function  Outcome: Progressing Towards Goal     Problem: Breathing Pattern - Ineffective  Goal: Ability to achieve and maintain a regular respiratory rate  Outcome: Progressing Towards Goal     Problem:  Body Temperature -  Risk of, Imbalanced  Goal: Ability to maintain a body temperature within defined limits  Outcome: Progressing Towards Goal  Goal: Will regain or maintain usual level of consciousness  Outcome: Progressing Towards Goal  Goal: Complications related to the disease process, condition or treatment will be avoided or minimized  Outcome: Progressing Towards Goal     Problem: Isolation Precautions - Risk of Spread of Infection  Goal: Prevent transmission of infectious organism to others  Outcome: Progressing Towards Goal     Problem: Nutrition Deficits  Goal: Optimize nutrtional status  Outcome: Progressing Towards Goal     Problem: Risk for Fluid Volume Deficit  Goal: Maintain normal heart rhythm  Outcome: Progressing Towards Goal  Goal: Maintain absence of muscle cramping  Outcome: Progressing Towards Goal  Goal: Maintain normal serum potassium, sodium, calcium, phosphorus, and pH  Outcome: Progressing Towards Goal     Problem: Loneliness or Risk for Loneliness  Goal: Demonstrate positive use of time alone when socialization is not possible  Outcome: Progressing Towards Goal     Problem: Fatigue  Goal: Verbalize increase energy and improved vitality  Outcome: Progressing Towards Goal     Problem: Patient Education: Go to Patient Education Activity  Goal: Patient/Family Education  Outcome: Progressing Towards Goal     Problem: Chronic Renal Failure  Goal: *Fluid and electrolytes stabilized  Outcome: Progressing Towards Goal     Problem: Patient Education: Go to Patient Education Activity  Goal: Patient/Family Education  Outcome: Progressing Towards Goal     Problem: Risk for Spread of Infection  Goal: Prevent transmission of infectious organism to others  Description: Prevent the transmission of infectious organisms to other patients, staff members, and visitors.   Outcome: Progressing Towards Goal     Problem: Patient Education:  Go to Education Activity  Goal: Patient/Family Education  Outcome: Progressing Towards Goal     Problem: Patient Education: Go to Patient Education Activity  Goal: Patient/Family Education  Outcome: Progressing Towards Goal

## 2022-07-01 NOTE — PROGRESS NOTES
Assessment:      pt is non compliant, cut dialysis short, does not show up for dialysis or comes to HD but then goes to bathroom and spend good time there, then leaves dialysis without  HD. Poor prognosis and high mortality. Recent blood cx is negative from 6/24 and 6/25  Appreciate vascular surgery assistanc  Appreciate ID assistance, antibiotics per ID    Intake and output   Dose all  meds for ESRD   Renal diet, salt and potassium restriction      Cefazolin 2/2/3 gm post HD MWF  Can be arranged with HD.   BMD- has secondary Hyperparathyroidism  Continue  calcitriol 1 mcg MWF  sevelamer for phos binders         Uncontrolled Hypertension: BP is at target range now  Resume coreg 12.5 mg BID  On doxazosine, Imdur and lasix        Anemia of CKD- Retacrit during HD         CC:ESRF       Subjective:   No change since i saw pt last. No new symptoms or issues. Blood pressure (!) 179/74, pulse 84, temperature 97.8 °F (36.6 °C), resp. rate 16, height 5' 7\" (1.702 m), weight 75.3 kg (166 lb), SpO2 99 %. NAD, Conversant     Psychicatric: poor judgment and insights, alert and oreinted       I have reviewed the following and here are my interpretation  )IR DECLOT AV GRAFT PERCUTANEOUS    Result Date: 6/6/2022  See impression. Fluoroscopy was provided for this procedure under the supervision and/or direction of the attending provider. ? For further information regarding this procedure, see patient medical record. MRI BRAIN WO CONT    Result Date: 6/1/2022  Brain MR without contrast: Indication: Mental status change. Decreased level of consciousness. Hypoglycemia. Procedure: Sagittal spin echo T1, axial FSE FLAIR and T2 and axial diffusion weighted scanning was performed. An axial T2* scan optimized to detect hemosiderin and calcium was performed. Coronal spin echo T1and FSE T2 scans were also performed. No contrast was administered. Comparison exam: Head CT 6/1/2022. Brain MRI 8/31/2021.  Findings: Diffusion: There are no areas of restricted diffusion, no acute infarction. The T2* scan shows no acute or chronic hemorrhage or abnormal calcifications. Brain parenchyma: Numerous bilateral right greater than left cerebellar hemisphere chronic areas of infarction are noted, unchanged from previous MRI. There are several chronic areas of infarction that are linear on the right and a single lesion on the left. There is a right paracentral upper pontine chronic lacunar small vessel infarction that is new from the previous MRI, centrally fluid signal with some peripheral high signal FLAIR. Very low level ischemic changes parieto-occipital periventricular and deep white matter. No new focal supratentorial lesion, mass or mass effect. Ventricles and sulci: Top normal lateral ventricles particularly frontal horns. Mild prominence of sulci in the perisylvian region. No change from previous. Extra axial: No extra axial fluid collection or mass. Brain vasculature: Normal, no arterial vascular abnormality is noted. Craniocervical Junction: Normal. Extracranial and skull base:  Severe proptosis by imaging criteria likely from lipomatosis and shallow orbits, unchanged from previous. 1. No acute hemorrhage or acute infarction. 2. Numerous bilateral chronic right greater than left cerebellar hemisphere infarctions unchanged from previous. 3. Chronic right paramedian upper pontine lacunar small vessel infarction, new from previous MRI. 4. Mild atrophy and ischemic white matter changes. CT HEAD WO CONT    Result Date: 6/1/2022  EXAM: CT head INDICATION: Confusion. COMPARISON: 8/29/2021 TECHNIQUE: Axial CT imaging of the head was performed without intravenous contrast. Standard multiplanar coronal and sagittal reformatted images were obtained and are included in interpretation.  One or more dose reduction techniques were used on this CT: automated exposure control, adjustment of the mAs and/or kVp according to patient size, and iterative reconstruction techniques. The specific techniques used on this CT exam have been documented in the patient's electronic medical record. Digital Imaging and Communications in Medicine (DICOM) format image data are available to nonaffiliated external healthcare facilities or entities on a secure, media free, reciprocally searchable basis with patient authorization for at least a 12-month period after this study. _______________ FINDINGS: BRAIN AND POSTERIOR FOSSA: Right central arden hypodensity, new from prior study. No evidence of acute large vessel transcortical infarct or acute parenchymal hemorrhage. No midline shift or hydrocephalus. EXTRA-AXIAL SPACES AND MENINGES: There are no abnormal extra-axial fluid collections. CALVARIUM: Intact. SINUSES: Clear. OTHER: None. _______________     Right central arden hypodensity, new from prior study, age indeterminate infarct. Correlate clinically. CT ABD PELV WO CONT    Result Date: 5/16/2022  EXAM: CT of the abdomen and pelvis CLINICAL INDICATION/HISTORY: LLQ abd pain, constipation and anal pain   > Additional: None. COMPARISON: 09/01/2021   > Reference Exam: None. TECHNIQUE: Axial CT imaging of the abdomen and pelvis was performed without intravenous contrast. Oral contrast not administered. Multiplanar reformats were generated. Dose reduction techniques used: automated exposure control, adjustment of the mAs and/or kVp according to patient size, and iterative reconstruction techniques. Digital Imaging and Communications in Medicine (DICOM) format image data are available to nonaffiliated external healthcare facilities or entities on a secure, media free, reciprocally searchable basis with patient authorization for at least a 12-month period after this study. _______________ FINDINGS: LOWER CHEST: Lung bases are clear. Heart is mildly enlarged without pericardial effusion. LIVER, BILIARY: Liver is unremarkable. No biliary dilation.  Gallbladder is markedly contracted but otherwise unremarkable. PANCREAS: Unremarkable. SPLEEN: Unremarkable. ADRENALS: Unremarkable. KIDNEYS/URETERS/BLADDER: No hydronephrosis. No calculi. LYMPH NODES: No enlarged lymph nodes. GASTROINTESTINAL TRACT: No bowel dilation or wall thickening. Colonic diverticulosis. PELVIC ORGANS: Unremarkable. VASCULATURE: Moderate to marked atherosclerosis. BONES: No acute or aggressive osseous abnormalities identified. OTHER: No ascites. _______________     1. No acute intra-abdominal abnormality. Colonic diverticulosis without acute diverticulitis. * **  *    XR CHEST PORT    Result Date: 6/20/2022  EXAM: XR CHEST PORT CLINICAL INDICATION/HISTORY: meets SIRS criteria -Additional: None COMPARISON: 6/2/2022 TECHNIQUE: Frontal view of the chest _______________ FINDINGS: HEART AND MEDIASTINUM: Right IJV vascular catheter tip at the SVC. Cardiomegaly. LUNGS AND PLEURAL SPACES: Interstitial/parenchymal opacities. No large effusion or pneumothorax. BONY THORAX AND SOFT TISSUES: No acute osseous abnormality _______________     Cardiomegaly with mild pulmonary edema. XR CHEST PORT    Result Date: 6/2/2022  EXAM: XR CHEST PORT CLINICAL INDICATION/HISTORY: CHF -Additional: None COMPARISON: One day prior TECHNIQUE: Portable frontal view of the chest _______________ FINDINGS: SUPPORT DEVICES: Right IJV catheter with tip at the cavoatrial junction. HEART AND MEDIASTINUM: Cardiomegaly. LUNGS AND PLEURAL SPACES: Bilateral parenchymal/interstitial haziness. No large effusion or pneumothorax. _______________     Cardiomegaly with pulmonary edema. XR CHEST PORT    Result Date: 6/1/2022  EXAM: XR CHEST PORT CLINICAL INDICATION/HISTORY: Encephalopathy -Additional: None COMPARISON: 1/16/2022 TECHNIQUE: Portable frontal view of the chest _______________ FINDINGS: SUPPORT DEVICES: Interval placement of a right IJV vessels catheter with tip at the cavoatrial junction. HEART AND MEDIASTINUM: Cardiomegaly.  LUNGS AND PLEURAL SPACES: Bilateral parenchymal/interstitial haziness. No large effusion or pneumothorax. _______________     Cardiomegaly with pulmonary edema. IR INSERT NON TUNL CVC OVER 5 YRS    Result Date: 6/1/2022  PROCEDURE: NON-TUNNELED DIALYSIS CATHETER PLACEMENT ATTENDING: Mercedes Fuller M.D. CONTRAST: None COMPLICATIONS: None MEDICATIONS: Local lidocaine. INDICATION: Renal failure requiring hemodialysis. PROCEDURE: Informed consent was obtained where the risks, benefits and alternatives to the procedure were explained. All elements of maximal sterile barrier technique followed including: cap and mask and sterile gown and sterile gloves and a large sterile sheet and hand hygiene and 2% chlorhexidine for cutaneous antisepsis (or acceptable alternative antiseptics, per current guidelines). Sterile ultrasound gel and a sterile cover for the US probe was used. After successfully identifying a patent right internal jugular vein, real-time ultrasound guidance demonstrated patent right internal jugular vein with normal waveforms. Then, real-time ultrasound guidance was used to puncture the right internal jugular vein. Permanent recording was created for the patient's record. Using this access, a 12 Fr dialysis catheter was placed under fluoroscopic guidance with its tip at the caval atrial junction. The catheter was secured to the skin with sutures, the ports heparinized, and a sterile dressing applied to the area. The catheter may be used immediately. The patient tolerated the procedure well and was transferred from the IR suite in stable condition. Fluoroscopic dose (reference air kerma): 9 mGy     Non-tunneled dialysis catheter placement performed, as discussed above. ECHO ADULT COMPLETE    Result Date: 6/22/2022    Left Ventricle: Normal left ventricular systolic function with a visually estimated EF of 55 - 60%. Left ventricle size is normal. Moderately increased wall thickness. Normal wall motion.  Abnormal diastolic function.   Aortic Valve: Tricuspid valve. Mildly calcified cusp.   Left Atrium: Left atrium is mildly dilated.   Pericardium: Small (<1 cm) pericardial effusion present. No indication of cardiac tamponade.   Unable to assess RVSP due to inadequate or insignificant tricuspid regurgitation.   Mitral annular calcification and calcification of the posterior mitral leaflet. There is a mobile probably calcified nodule on the posterior leaflet of the mitral valve. Vegetation cannot be ruled out on this transthoracic study. Consider BRENEDN for further evaluation. ECHO ADULT COMPLETE    Result Date: 6/2/2022    Left Ventricle: Normal left ventricular systolic function with a visually estimated EF of 60 - 65%. Left ventricle size is normal. Moderately increased wall thickness. Findings consistent with moderate concentric hypertrophy. Normal wall motion. Grade I diastolic dysfunction with normal LAP.   Left Atrium: Left atrium is mildly dilated.   Mitral Valve: Moderate annular calcification at the posterior leaflet of the mitral valve. Trace regurgitation. No stenosis noted.   Tricuspid Valve : Inadequate tricuspid regurgitant jejt to comment on estimated PASP.   Interatrial Septum: No interatrial shunt visualized with color Doppler. Grade 0 Absence of bubbles. Agitated saline study was negative with and without provocation. DUPLEX CAROTID BILATERAL    Result Date: 6/2/2022  Bilateral less than 50% stenosis of the internal carotid arteries. No significant stenosis in the external carotid arteries bilaterally. Antegrade flow in both vertebral arteries. Normal flow in both subclavian arteries    DUPLEX HEMODIALYSIS ACCESS LEFT    Result Date: 6/23/2022  · Adjacent to mid portion of the occluded graft complex cystic mass noted measuring 1.8 trv x 1.4 ap cm      DUPLEX UPPER EXT BYPASS GRAFT LEFT    Result Date: 6/7/2022  Occluded arteriovenous graft in left upper extremity.         Intake/Output Summary (Last 24 hours) at 7/1/2022 1503  Last data filed at 7/1/2022 1139  Gross per 24 hour   Intake 240 ml   Output --   Net 240 ml      Recent Labs     06/29/22  1706   WBC 8.7     Lab Results   Component Value Date/Time    Sodium 134 (L) 06/26/2022 07:35 AM    Potassium 4.3 06/26/2022 07:35 AM    Chloride 101 06/26/2022 07:35 AM    CO2 24 06/26/2022 07:35 AM    Anion gap 9 06/26/2022 07:35 AM    Glucose 81 06/26/2022 07:35 AM    BUN 65 (H) 06/26/2022 07:35 AM    Creatinine 12.00 (H) 06/26/2022 07:35 AM    BUN/Creatinine ratio 5 (L) 06/26/2022 07:35 AM    GFR est AA 5 (L) 06/26/2022 07:35 AM    GFR est non-AA 4 (L) 06/26/2022 07:35 AM    Calcium 7.7 (L) 06/26/2022 07:35 AM      Allergies   Allergen Reactions    Penicillins Rash    Vancomycin Other (comments)     kayla syndrome     Current Facility-Administered Medications   Medication Dose Route Frequency Provider Last Rate Last Admin    ceFAZolin (ANCEF) 2 g/20 mL in sterile water IV syringe  2 g IntraVENous Once per day on Mon Wed RishiEmily benitez MD   2 g at 06/29/22 2151    ceFAZolin (ANCEF) 3 g/30 mL in sterile water IV syringe  3 g IntraVENous every Friday Nicole Blackwell MD        Saccharomyces boulardii (FLORASTOR) capsule 500 mg  500 mg Oral BID Emily Blackwell MD   500 mg at 06/30/22 2030    diphenhydrAMINE (BENADRYL) injection 25-50 mg  25-50 mg IntraVENous Rad Kaylee Avila MD   50 mg at 06/26/22 1356    fentaNYL citrate (PF) injection  mcg   mcg IntraVENous Rad Kaylee Avila MD   50 mcg at 06/26/22 1406    naloxone (NARCAN) injection 0.4 mg  0.4 mg IntraVENous PRN Akira Avila MD        HYDROmorphone (DILAUDID) injection 0.5 mg  0.5 mg IntraVENous Q3H PRN Wen Fuentes PA-C   0.5 mg at 06/26/22 0856    oxyCODONE-acetaminophen (PERCOCET) 5-325 mg per tablet 1-2 Tablet  1-2 Tablet Oral Q6H PRN Wen Fuentes PA-C   2 Tablet at 06/30/22 1054    epoetin delonte-epbx (RETACRIT) injection 20,000 Units 20,000 Units SubCUTAneous Q MON, WED & FRI Kyle Caban MD   20,000 Units at 06/27/22 2238    sevelamer carbonate (RENVELA) oral powder 1,200 mg  1,200 mg Oral TID WITH MEALS José Alston MD   1,200 mg at 06/30/22 1030    ondansetron (ZOFRAN) injection 4 mg  4 mg IntraVENous Q8H PRN Kalli Vargas MD        acetaminophen (TYLENOL) tablet 650 mg  650 mg Oral Q6H PRN Jean Pierre Watson MD   650 mg at 06/22/22 1417    Or    acetaminophen (TYLENOL) suppository 650 mg  650 mg Rectal Q6H PRN Jean Pierre Watson MD        guaiFENesin-dextromethorphan (ROBITUSSIN DM) 100-10 mg/5 mL syrup 5 mL  5 mL Oral Q4H PRN Jean Pierre Watson MD        sodium chloride (NS) flush 5-10 mL  5-10 mL IntraVENous PRN Chucho Licea MD   10 mL at 06/22/22 0650    carvediloL (COREG) tablet 12.5 mg  12.5 mg Oral BID WITH MEALS José Alston MD   12.5 mg at 06/30/22 1851    calcitRIOL (ROCALTROL) capsule 0.25 mcg  0.25 mcg Oral Q MON, WED & José Loo MD   0.25 mcg at 06/29/22 2150    aspirin delayed-release tablet 81 mg  81 mg Oral DAILY Magnus MEJISA MD   81 mg at 06/30/22 1030    cyanocobalamin tablet 1,000 mcg  1,000 mcg Oral DAILY Magnus MEJIAS MD   1,000 mcg at 06/30/22 1030    doxazosin (CARDURA) tablet 2 mg  2 mg Oral DAILY Estelita Collet, Mohammed A, MD   2 mg at 06/30/22 1030    furosemide (LASIX) tablet 80 mg  80 mg Oral BID Magnus MEJIAS MD   80 mg at 06/30/22 2030    isosorbide mononitrate ER (IMDUR) tablet 60 mg  60 mg Oral DAILY Magnus MEJIAS MD   60 mg at 06/30/22 1030    pantoprazole (PROTONIX) tablet 40 mg  40 mg Oral ACB Estelita Collet, Mohammed A, MD   40 mg at 06/30/22 6611    glucose chewable tablet 16 g  16 g Oral PRN Kalli Vargas MD        glucagon (GLUCAGEN) injection 1 mg  1 mg IntraMUSCular PRN Kalli Vargas MD        insulin lispro (HUMALOG) injection   SubCUTAneous AC&HS Kalli Vargas MD   4 Units at 06/23/22 1142    dextrose 10% infusion 0-250 mL  0-250 mL IntraVENous PRN Shyam Garsia MD        hydrALAZINE (APRESOLINE) 20 mg/mL injection 20 mg  20 mg IntraVENous Q6H PRN Eldon MEJIAS MD   20 mg at 06/30/22 0644    heparin (porcine) injection 5,000 Units  5,000 Units SubCUTAneous Q8H Eldon MEJIAS MD   5,000 Units at 07/01/22 0217    heparin (porcine) 1,000 unit/mL injection 3,600 Units  3,600 Units Hemodialysis DIALYSIS PRN Zay Michael MD   3,600 Units at 06/27/22 1345

## 2022-07-01 NOTE — PROGRESS NOTES
Problem: Mobility Impaired (Adult and Pediatric)  Goal: *Acute Goals and Plan of Care (Insert Text)  Description: Physical Therapy Goals  Initiated 6/30/2022 and to be accomplished within 7 day(s)  1. Patient will move from supine to sit and sit to supine  in bed with independence. 2.  Patient will transfer from bed to chair and chair to bed with modified independence using the least restrictive device. 3.  Patient will perform sit to stand with modified independence. 4.  Patient will ambulate with modified independence for 50 feet with the least restrictive device. Outcome: Progressing Towards Goal   PHYSICAL THERAPY TREATMENT    Patient: Jeannette Dewey (50 y.o. male)  Date: 7/1/2022  Diagnosis: COVID-19 [U07.1] Pulmonary edema  Procedure(s) (LRB):  EXCISION OF INFECTED AV GRAPH LEFT UPPER ARM,TEMPORARY  DIALYSIS CATHETER PLACEMENT RIGHT LEG, REPAIR BRACIAL ARTERY WITH PATCH, HARVEST OF BASILLIC VEIN. (N/A) 7 Days Post-Op  Precautions: Fall,WBAT  PLOF: R BKA with prothesis, Ambulatory with rollator    ASSESSMENT:  Pt supine in bed upon arrival.  Sat up EOB with CGA. Donned Prosthesis independently. Sit to stand performed with bed in lowest position and without difficulty. Ambulated 40ft in room with rollator, reciprocal gt pattern, steady slow pace, no LOB or path deviations. Pt left on BSC to have a BM. Progression toward goals:   [x]      Improving appropriately and progressing toward goals  [x]      Improving slowly and progressing toward goals  []      Not making progress toward goals and plan of care will be adjusted     PLAN:  Patient continues to benefit from skilled intervention to address the above impairments. Continue treatment per established plan of care. Discharge Recommendations:  None  Further Equipment Recommendations for Discharge:  N/A     SUBJECTIVE:   Patient stated I can try.     OBJECTIVE DATA SUMMARY:   Critical Behavior:  Neurologic State: Alert  Orientation Level: Oriented X4  Cognition: Appropriate decision making,Appropriate safety awareness,Follows commands    Functional Mobility Training:  Bed Mobility:    Supine to Sit: Contact guard assistance  Sit to Supine: Independent  Transfers:  Sit to Stand: Stand-by assistance  Stand to Sit: Stand-by assistance  Balance:  Sitting: Intact  Standing: Intact; With support   Ambulation/Gait Training:  Distance (ft): 40 Feet (ft)  Assistive Device: Gait belt;Walker, rollator  Ambulation - Level of Assistance: Contact guard assistance  Gait Abnormalities: Decreased step clearance  Speed/Lottie: Slow        Pain:  Pain level pre-treatment: 0/10  Pain level post-treatment: 0/10   Pain Intervention(s): Medication (see MAR); Rest, Ice, Repositioning   Response to intervention: Nurse notified, See doc flow    Activity Tolerance:   fair  Please refer to the flowsheet for vital signs taken during this treatment. After treatment:   [] Patient left in no apparent distress sitting up in chair  [] Patient left in no apparent distress in bed  [] Call bell left within reach  [] Nursing notified  [] Caregiver present  [] Bed alarm activated  [] SCDs applied      COMMUNICATION/EDUCATION:   [x]         Role of Physical Therapy in the acute care setting. [x]         Fall prevention education was provided and the patient/caregiver indicated understanding. [x]         Patient/family have participated as able in working toward goals and plan of care. [x]         Patient/family agree to work toward stated goals and plan of care. []         Patient understands intent and goals of therapy, but is neutral about his/her participation.   []         Patient is unable to participate in stated goals/plan of care: ongoing with therapy staff.  []         Other:        Tenisha Pastrana PTA   Time Calculation: 17 mins

## 2022-07-01 NOTE — PROGRESS NOTES
Called for positive cultures  Allergy to vanc already on ancef   Wbc improved no fever  Will defer further abx to ID Dr. Kim Romero  Likely ok on current regimen

## 2022-07-01 NOTE — PROGRESS NOTES
Hospitalist Progress Note    Patient: Pattie Nguyễn MRN: [id number]  CSN: [id number]    YOB: 1970  Age: 50 y.o. 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 7XXA        Chief complaint :  Fever, cough, congestion  54 y.o. male with end-stage renal disease, diabetes, CHF, hypertension, s/p right BKA presents to ER with concerns of not feeling well, fever congestion cough and shortness of breath. Sepsis -  POA  Secondary to bacteremia  Source TDC vs AVG    Persistent Bacteremia -  Blood cultures growing MSSA  Blood cultures from 06/29 are positive. Follow repeat cultures. On cefazolin. ID following. Diarrhea now better, continue with probiotics. Monitor for CDI. LUE non functioning AVG   source of infection  Vascular consulted  S/p excision of infected AV graft left upper arm. S/p TDC placement. Volume overload/pulmonary edema-  Resolved with Fluid removal with HD     ESRD-  Nephrology following  HD per nephrology     Hypertension-  Monitor BP  Continue with home medications.     COVID 19-  Not hypoxic, monitor  Repeat rapid COVID 19 positive.     DM-  Started on sliding scale insulin, ADA diet. Blood sugars low, stop lantus.     Right BKA     DVT prophylaxis with heparin       Disposition : discharge planning to rehab    Review of systems  General: No fevers or chills. Cardiovascular: No chest pain or pressure. No palpitations.    Pulmonary: No shortness of breath. Gastrointestinal: No nausea, vomiting. Physical Exam:  General: Awake, cooperative, no acute distress    HEENT: NC, Atraumatic. PERRLA, anicteric sclerae. Lungs: CTA Bilaterally. No Wheezing/Rhonchi/Rales. Heart:  S1 S2,  No murmur, No Rubs, No Gallops  Abdomen: Soft, Non distended, Non tender.  +Bowel sounds,   Extremities: Right BKA, tender LUE AVG site  Psych:   Not anxious or agitated. Neurologic:  No acute neurological deficit. Vital signs/Intake and Output:  Visit Vitals  BP (!) 179/74 (BP 1 Location: Right lower arm, BP Patient Position: At rest)   Pulse 84   Temp 97.8 °F (36.6 °C)   Resp 16   Ht 5' 7\" (1.702 m)   Wt 75.3 kg (166 lb)   SpO2 99%   BMI 26.00 kg/m²     Current Shift:  07/01 0701 - 07/01 1900  In: 240 [P.O.:240]  Out: -   Last three shifts:  No intake/output data recorded. Labs: Results:       Chemistry Recent Labs     06/29/22  1706   *   TP 7.1   ALB 2.3*   GLOB 4.8*   AGRAT 0.5*      CBC w/Diff Recent Labs     06/29/22  1706   WBC 8.7   RBC 2.97*   HGB 7.6*   HCT 23.5*      GRANS 74*   LYMPH 10*   EOS 3      Cardiac Enzymes No results for input(s): CPK, CKND1, WILLIE in the last 72 hours. No lab exists for component: CKRMB, TROIP   Coagulation No results for input(s): PTP, INR, APTT, INREXT, INREXT in the last 72 hours. Lipid Panel Lab Results   Component Value Date/Time    Cholesterol, total 115 06/02/2022 09:45 AM    HDL Cholesterol 62 (H) 06/02/2022 09:45 AM    LDL, calculated 46 06/02/2022 09:45 AM    VLDL, calculated 7 06/02/2022 09:45 AM    Triglyceride 35 06/02/2022 09:45 AM    CHOL/HDL Ratio 1.9 06/02/2022 09:45 AM      BNP No results for input(s): BNPP in the last 72 hours.    Liver Enzymes Recent Labs     06/29/22  1706   TP 7.1   ALB 2.3*   *      Thyroid Studies Lab Results   Component Value Date/Time    TSH 2.38 08/29/2021 01:20 AM        Procedures/imaging: see electronic medical records for all procedures/Xrays and details which were not copied into this note but were reviewed prior to creation of Plan

## 2022-07-01 NOTE — PROGRESS NOTES
[id number] Shift Summary: Pt rested well overnight with no complaints. No new clinical concerns noted.      Nightshift Chart Audit Completed

## 2022-07-02 LAB
BACTERIA SPEC CULT: ABNORMAL
BACTERIA SPEC CULT: NORMAL
GLUCOSE BLD STRIP.AUTO-MCNC: 101 MG/DL (ref 70–110)
GLUCOSE BLD STRIP.AUTO-MCNC: 111 MG/DL (ref 70–110)
GLUCOSE BLD STRIP.AUTO-MCNC: 92 MG/DL (ref 70–110)
GLUCOSE BLD STRIP.AUTO-MCNC: 93 MG/DL (ref 70–110)
GRAM STN SPEC: ABNORMAL
GRAM STN SPEC: ABNORMAL
SERVICE CMNT-IMP: ABNORMAL
SERVICE CMNT-IMP: NORMAL

## 2022-07-02 PROCEDURE — 65270000046 HC RM TELEMETRY

## 2022-07-02 PROCEDURE — 74011250637 HC RX REV CODE- 250/637: Performed by: HOSPITALIST

## 2022-07-02 PROCEDURE — 74011250637 HC RX REV CODE- 250/637: Performed by: INTERNAL MEDICINE

## 2022-07-02 PROCEDURE — 74011250637 HC RX REV CODE- 250/637: Performed by: STUDENT IN AN ORGANIZED HEALTH CARE EDUCATION/TRAINING PROGRAM

## 2022-07-02 PROCEDURE — 82962 GLUCOSE BLOOD TEST: CPT

## 2022-07-02 PROCEDURE — 74011250636 HC RX REV CODE- 250/636: Performed by: HOSPITALIST

## 2022-07-02 RX ADMIN — CARVEDILOL 12.5 MG: 12.5 TABLET, FILM COATED ORAL at 09:14

## 2022-07-02 RX ADMIN — CARVEDILOL 12.5 MG: 12.5 TABLET, FILM COATED ORAL at 16:44

## 2022-07-02 RX ADMIN — ASPIRIN 81 MG: 81 TABLET, COATED ORAL at 09:13

## 2022-07-02 RX ADMIN — ONDANSETRON 4 MG: 2 INJECTION INTRAMUSCULAR; INTRAVENOUS at 17:57

## 2022-07-02 RX ADMIN — HEPARIN SODIUM 5000 UNITS: 5000 INJECTION INTRAVENOUS; SUBCUTANEOUS at 18:48

## 2022-07-02 RX ADMIN — DOXAZOSIN 2 MG: 1 TABLET ORAL at 09:14

## 2022-07-02 RX ADMIN — Medication 500 MG: at 20:26

## 2022-07-02 RX ADMIN — FUROSEMIDE 80 MG: 40 TABLET ORAL at 20:27

## 2022-07-02 RX ADMIN — Medication 500 MG: at 09:23

## 2022-07-02 RX ADMIN — HEPARIN SODIUM 5000 UNITS: 5000 INJECTION INTRAVENOUS; SUBCUTANEOUS at 11:23

## 2022-07-02 RX ADMIN — SEVELAMER CARBONATE 1200 MG: 2400 POWDER, FOR SUSPENSION ORAL at 11:25

## 2022-07-02 RX ADMIN — HYDRALAZINE HYDROCHLORIDE 20 MG: 20 INJECTION INTRAMUSCULAR; INTRAVENOUS at 01:55

## 2022-07-02 RX ADMIN — HEPARIN SODIUM 5000 UNITS: 5000 INJECTION INTRAVENOUS; SUBCUTANEOUS at 01:55

## 2022-07-02 RX ADMIN — ISOSORBIDE MONONITRATE 60 MG: 60 TABLET, EXTENDED RELEASE ORAL at 09:14

## 2022-07-02 RX ADMIN — SEVELAMER CARBONATE 1200 MG: 2400 POWDER, FOR SUSPENSION ORAL at 16:45

## 2022-07-02 RX ADMIN — FUROSEMIDE 80 MG: 40 TABLET ORAL at 09:14

## 2022-07-02 RX ADMIN — CYANOCOBALAMIN TAB 1000 MCG 1000 MCG: 1000 TAB at 09:14

## 2022-07-02 RX ADMIN — PANTOPRAZOLE SODIUM 40 MG: 40 TABLET, DELAYED RELEASE ORAL at 09:14

## 2022-07-02 NOTE — PROGRESS NOTES
Problem: Falls - Risk of  Goal: *Absence of Falls  Description: Document Cruzitomon Carolinaman Fall Risk and appropriate interventions in the flowsheet. Outcome: Progressing Towards Goal  Note: Fall Risk Interventions:  Mobility Interventions: Utilize walker, cane, or other assistive device,Patient to call before getting OOB,Bed/chair exit alarm         Medication Interventions: Teach patient to arise slowly,Patient to call before getting OOB,Bed/chair exit alarm    Elimination Interventions: Bed/chair exit alarm,Call light in reach,Patient to call for help with toileting needs,Toileting schedule/hourly rounds    History of Falls Interventions: Bed/chair exit alarm         Problem: Patient Education: Go to Patient Education Activity  Goal: Patient/Family Education  Outcome: Progressing Towards Goal     Problem: Pressure Injury - Risk of  Goal: *Prevention of pressure injury  Description: Document Tucker Scale and appropriate interventions in the flowsheet.   Outcome: Progressing Towards Goal  Note: Pressure Injury Interventions:  Sensory Interventions: Assess need for specialty bed    Moisture Interventions: Minimize layers,Limit adult briefs,Check for incontinence Q2 hours and as needed,Apply protective barrier, creams and emollients,Absorbent underpads    Activity Interventions: Pressure redistribution bed/mattress(bed type)    Mobility Interventions: Float heels,Pressure redistribution bed/mattress (bed type)    Nutrition Interventions: Offer support with meals,snacks and hydration,Document food/fluid/supplement intake,Discuss nutritional consult with provider    Friction and Shear Interventions: Apply protective barrier, creams and emollients,Foam dressings/transparent film/skin sealants,HOB 30 degrees or less,Transferring/repositioning devices                Problem: Patient Education: Go to Patient Education Activity  Goal: Patient/Family Education  Outcome: Progressing Towards Goal     Problem: Airway Clearance - Ineffective  Goal: Achieve or maintain patent airway  Outcome: Progressing Towards Goal     Problem: Gas Exchange - Impaired  Goal: Absence of hypoxia  Outcome: Progressing Towards Goal  Goal: Promote optimal lung function  Outcome: Progressing Towards Goal     Problem: Breathing Pattern - Ineffective  Goal: Ability to achieve and maintain a regular respiratory rate  Outcome: Progressing Towards Goal     Problem:  Body Temperature -  Risk of, Imbalanced  Goal: Ability to maintain a body temperature within defined limits  Outcome: Progressing Towards Goal  Goal: Will regain or maintain usual level of consciousness  Outcome: Progressing Towards Goal  Goal: Complications related to the disease process, condition or treatment will be avoided or minimized  Outcome: Progressing Towards Goal     Problem: Isolation Precautions - Risk of Spread of Infection  Goal: Prevent transmission of infectious organism to others  Outcome: Progressing Towards Goal     Problem: Nutrition Deficits  Goal: Optimize nutrtional status  Outcome: Progressing Towards Goal     Problem: Risk for Fluid Volume Deficit  Goal: Maintain normal heart rhythm  Outcome: Progressing Towards Goal  Goal: Maintain absence of muscle cramping  Outcome: Progressing Towards Goal  Goal: Maintain normal serum potassium, sodium, calcium, phosphorus, and pH  Outcome: Progressing Towards Goal     Problem: Loneliness or Risk for Loneliness  Goal: Demonstrate positive use of time alone when socialization is not possible  Outcome: Progressing Towards Goal     Problem: Fatigue  Goal: Verbalize increase energy and improved vitality  Outcome: Progressing Towards Goal     Problem: Patient Education: Go to Patient Education Activity  Goal: Patient/Family Education  Outcome: Progressing Towards Goal     Problem: Chronic Renal Failure  Goal: *Fluid and electrolytes stabilized  Outcome: Progressing Towards Goal     Problem: Patient Education: Go to Patient Education Activity  Goal: Patient/Family Education  Outcome: Progressing Towards Goal     Problem: Risk for Spread of Infection  Goal: Prevent transmission of infectious organism to others  Description: Prevent the transmission of infectious organisms to other patients, staff members, and visitors.   Outcome: Progressing Towards Goal     Problem: Patient Education:  Go to Education Activity  Goal: Patient/Family Education  Outcome: Progressing Towards Goal     Problem: Patient Education: Go to Patient Education Activity  Goal: Patient/Family Education  Outcome: Progressing Towards Goal     Problem: Patient Education: Go to Patient Education Activity  Goal: Patient/Family Education  Outcome: Progressing Towards Goal

## 2022-07-02 NOTE — PROGRESS NOTES
Problem: Falls - Risk of  Goal: *Absence of Falls  Description: Document Danilo Nikkie Fall Risk and appropriate interventions in the flowsheet.   Outcome: Progressing Towards Goal  Note: Fall Risk Interventions:  Mobility Interventions: Utilize walker, cane, or other assistive device,Patient to call before getting OOB,Bed/chair exit alarm         Medication Interventions: Teach patient to arise slowly,Patient to call before getting OOB,Bed/chair exit alarm    Elimination Interventions: Bed/chair exit alarm,Call light in reach,Patient to call for help with toileting needs,Toileting schedule/hourly rounds    History of Falls Interventions: Bed/chair exit alarm

## 2022-07-02 NOTE — PROGRESS NOTES
Hospitalist Progress Note    Patient: Brit Mejia MRN: [id number]  CSN: [id number]    YOB: 1970  Age: 50 y.o. Sex: male    DOA: 6/20/2022 LOS:  LOS: 12 days                Assessment/Plan     Patient Active Problem List   Diagnosis Code    Iron deficiency anemia D50.9    Other restrictive cardiomyopathy ([facility] I42.5    ESRD (end stage renal disease) on dialysis ([facility] N18.6, Z99.2    HTN (hypertension) I10    Hx of BKA, right ([facility] Z89.511    Uremia N19    [facility] ([facility] Z91.15    Hypertension I10    COVID-19 U07.1    Pulmonary edema J81.1    Volume overload E87.70    Sepsis ([facility] A41.9    Bacteremia R78.81    Arteriovenous graft infection ([facility] T82. 7XXA        Chief complaint :  Fever, cough, congestion  54 y.o. male with end-stage renal disease, diabetes, CHF, hypertension, s/p right BKA presents to ER with concerns of not feeling well, fever congestion cough and shortness of breath. Sepsis -  POA  Secondary to bacteremia  Source TDC vs AVG    Persistent Bacteremia -  Blood cultures growing MSSA  Blood cultures from 06/29 with coag negative staph, likely contaminant. repeat cultures negative. On cefazolin. ID following. Diarrhea now better, continue with probiotics. Monitor for CDI. LUE non functioning AVG   source of infection  Vascular consulted  S/p excision of infected AV graft left upper arm. S/p TDC placement. Volume overload/pulmonary edema-  Resolved with Fluid removal with HD     ESRD-  Nephrology following  HD per nephrology     Hypertension-  Monitor BP  Continue with home medications.     COVID 19-  Not hypoxic, monitor  Repeat rapid COVID 19 positive.     DM-  Started on sliding scale insulin, ADA diet. Blood sugars low, stop lantus.     Right BKA     DVT prophylaxis with heparin       Disposition : discharge planning    Review of systems  General: No fevers or chills. Cardiovascular: No chest pain or pressure.  No palpitations. Pulmonary: No shortness of breath. Gastrointestinal: No nausea, vomiting. Physical Exam:  General: Awake, cooperative, no acute distress    HEENT: NC, Atraumatic. PERRLA, anicteric sclerae. Lungs: CTA Bilaterally. No Wheezing/Rhonchi/Rales. Heart:  S1 S2,  No murmur, No Rubs, No Gallops  Abdomen: Soft, Non distended, Non tender.  +Bowel sounds,   Extremities: Right BKA, tender LUE AVG site  Psych:   Not anxious or agitated. Neurologic:  No acute neurological deficit. Vital signs/Intake and Output:  Visit Vitals  BP (!) 140/65   Pulse 91   Temp 98.5 °F (36.9 °C)   Resp 16   Ht 5' 7\" (1.702 m)   Wt 73.4 kg (161 lb 13.1 oz)   SpO2 100%   BMI 25.34 kg/m²     Current Shift:  No intake/output data recorded. Last three shifts:  06/30 1901 - 07/02 0700  In: 240 [P.O.:240]  Out: 2000             Labs: Results:       Chemistry No results for input(s): GLU, NA, K, CL, CO2, BUN, CREA, CA, AGAP, BUCR, TBIL, AP, TP, ALB, GLOB, AGRAT in the last 72 hours. No lab exists for component: GPT   CBC w/Diff No results for input(s): WBC, RBC, HGB, HCT, PLT, GRANS, LYMPH, EOS, HGBEXT, HCTEXT, PLTEXT, HGBEXT, HCTEXT, PLTEXT in the last 72 hours. Cardiac Enzymes No results for input(s): CPK, CKND1, WILLIE in the last 72 hours. No lab exists for component: CKRMB, TROIP   Coagulation No results for input(s): PTP, INR, APTT, INREXT, INREXT in the last 72 hours. Lipid Panel Lab Results   Component Value Date/Time    Cholesterol, total 115 06/02/2022 09:45 AM    HDL Cholesterol 62 (H) 06/02/2022 09:45 AM    LDL, calculated 46 06/02/2022 09:45 AM    VLDL, calculated 7 06/02/2022 09:45 AM    Triglyceride 35 06/02/2022 09:45 AM    CHOL/HDL Ratio 1.9 06/02/2022 09:45 AM      BNP No results for input(s): BNPP in the last 72 hours. Liver Enzymes No results for input(s): TP, ALB, TBIL, AP in the last 72 hours.     No lab exists for component: SGOT, GPT, DBIL   Thyroid Studies Lab Results   Component Value Date/Time    TSH 2.38 08/29/2021 01:20 AM        Procedures/imaging: see electronic medical records for all procedures/Xrays and details which were not copied into this note but were reviewed prior to creation of Plan

## 2022-07-02 NOTE — PROGRESS NOTES
Assessment:   pt is non compliant, cut dialysis short, does not show up for dialysis or comes to HD but then goes to bathroom and spend good time there, then leaves dialysis without  HD. Poor prognosis and high mortality. Recent blood cx is negative from 6/24 and 6/25  Appreciate vascular surgery assistanc  Appreciate ID assistance, antibiotics per ID    Intake and output   Dose all  meds for ESRD   Renal diet, salt and potassium restriction      Cefazolin 2/2/3 gm post HD MWF  Can be arranged with HD.   BMD- has secondary Hyperparathyroidism  Continue  calcitriol 1 mcg MWF  sevelamer for phos binders         Uncontrolled Hypertension: BP is at target range now  Resume coreg 12.5 mg BID  On doxazosine, Imdur and lasix        Anemia of CKD- Retacrit during HD         CC:ESRF            Subjective:   PT does not have any somatic complaints                Blood pressure (!) 131/45, pulse 93, temperature 98.7 °F (37.1 °C), resp.  rate 16, height 5' 7\" (1.702 m), weight 73.4 kg (161 lb 13.1 oz), SpO2 99 %.         NAD, Conversant     Psychicatric: poor judgment and insights, alert and oreinted           Intake/Output Summary (Last 24 hours) at 7/2/2022 1226  Last data filed at 7/1/2022 2200  Gross per 24 hour   Intake --   Output 2000 ml   Net -2000 ml      Recent Labs     06/29/22  1706   WBC 8.7     Lab Results   Component Value Date/Time    Sodium 134 (L) 06/26/2022 07:35 AM    Potassium 4.3 06/26/2022 07:35 AM    Chloride 101 06/26/2022 07:35 AM    CO2 24 06/26/2022 07:35 AM    Anion gap 9 06/26/2022 07:35 AM    Glucose 81 06/26/2022 07:35 AM    BUN 65 (H) 06/26/2022 07:35 AM    Creatinine 12.00 (H) 06/26/2022 07:35 AM    BUN/Creatinine ratio 5 (L) 06/26/2022 07:35 AM    GFR est AA 5 (L) 06/26/2022 07:35 AM    GFR est non-AA 4 (L) 06/26/2022 07:35 AM    Calcium 7.7 (L) 06/26/2022 07:35 AM        Current Facility-Administered Medications   Medication Dose Route Frequency Provider Last Rate Last Admin    ceFAZolin (ANCEF) 2 g/20 mL in sterile water IV syringe  2 g IntraVENous Once per day on Mon Wed Emily Blackwell MD   2 g at 06/29/22 2151    ceFAZolin (ANCEF) 3 g/30 mL in sterile water IV syringe  3 g IntraVENous every Friday Emily Blackwell MD   3 g at 07/01/22 2251    Saccharomyces boulardii (FLORASTOR) capsule 500 mg  500 mg Oral BID Emily Blackwell MD   500 mg at 07/02/22 0923    diphenhydrAMINE (BENADRYL) injection 25-50 mg  25-50 mg IntraVENous Rad Multiple Domenic Busby MD   50 mg at 06/26/22 1356    fentaNYL citrate (PF) injection  mcg   mcg IntraVENous Rad Multiple Domenic Busby MD   50 mcg at 06/26/22 1406    naloxone (NARCAN) injection 0.4 mg  0.4 mg IntraVENous PRN Domenic Busby MD        HYDROmorphone (DILAUDID) injection 0.5 mg  0.5 mg IntraVENous Q3H PRN Carol Goodman PA-C   0.5 mg at 06/26/22 0856    oxyCODONE-acetaminophen (PERCOCET) 5-325 mg per tablet 1-2 Tablet  1-2 Tablet Oral Q6H PRN Carol Goodman PA-C   2 Tablet at 06/30/22 1054    epoetin delonte-epbx (RETACRIT) injection 20,000 Units  20,000 Units SubCUTAneous Q MON, WED & FRI Shannan Blair MD   20,000 Units at 07/01/22 2250    sevelamer carbonate (RENVELA) oral powder 1,200 mg  1,200 mg Oral TID WITH MEALS José Alston MD   1,200 mg at 07/02/22 1125    ondansetron (ZOFRAN) injection 4 mg  4 mg IntraVENous Q8H PRN Meredith Lizarraga MD        acetaminophen (TYLENOL) tablet 650 mg  650 mg Oral Q6H PRN Neel Watson MD   650 mg at 06/22/22 1417    Or    acetaminophen (TYLENOL) suppository 650 mg  650 mg Rectal Q6H PRN Neel Watson MD        guaiFENesin-dextromethorphan (ROBITUSSIN DM) 100-10 mg/5 mL syrup 5 mL  5 mL Oral Q4H PRN Neel Watson MD        sodium chloride (NS) flush 5-10 mL  5-10 mL IntraVENous PRN Shira Licea MD   10 mL at 06/22/22 0650    carvediloL (COREG) tablet 12.5 mg  12.5 mg Oral BID WITH MEALS José Alston MD   12.5 mg at 07/02/22 0914    calcitRIOL (ROCALTROL) capsule 0.25 mcg  0.25 mcg Oral Q MON, WED & Ulus José Banegas MD   0.25 mcg at 07/01/22 2252    aspirin delayed-release tablet 81 mg  81 mg Oral DAILY Karena Cowart MD   81 mg at 07/02/22 0913    cyanocobalamin tablet 1,000 mcg  1,000 mcg Oral DAILY Karena Cowart MD   1,000 mcg at 07/02/22 0914    doxazosin (CARDURA) tablet 2 mg  2 mg Oral DAILY Bay MEJIAS MD   2 mg at 07/02/22 0914    furosemide (LASIX) tablet 80 mg  80 mg Oral BID Bay MEJIAS MD   80 mg at 07/02/22 0914    isosorbide mononitrate ER (IMDUR) tablet 60 mg  60 mg Oral DAILY Karena Cowart MD   60 mg at 07/02/22 0914    pantoprazole (PROTONIX) tablet 40 mg  40 mg Oral ACB Bay MEJIAS MD   40 mg at 07/02/22 0914    glucose chewable tablet 16 g  16 g Oral PRN Karena Cowart MD        glucagon (GLUCAGEN) injection 1 mg  1 mg IntraMUSCular PRN Karena Cowart MD        insulin lispro (HUMALOG) injection   SubCUTAneous AC&HS Bay MEJIAS MD   4 Units at 06/23/22 1142    dextrose 10% infusion 0-250 mL  0-250 mL IntraVENous PRN Karena Cowart MD        hydrALAZINE (APRESOLINE) 20 mg/mL injection 20 mg  20 mg IntraVENous Q6H PRN Karena Cowart MD   20 mg at 07/02/22 0155    heparin (porcine) injection 5,000 Units  5,000 Units SubCUTAneous Q8H Bay MEJIAS MD   5,000 Units at 07/02/22 1123    heparin (porcine) 1,000 unit/mL injection 3,600 Units  3,600 Units Hemodialysis DIALYSIS PRN Charlie Pelaez MD   3,600 Units at 07/01/22 2232     Total time spent cordination care.  31 minutes

## 2022-07-02 NOTE — PROGRESS NOTES
Problem: Chronic Renal Failure  Goal: *Fluid and electrolytes stabilized  Outcome: Progressing Towards Goal     Problem: Patient Education: Go to Patient Education Activity  Goal: Patient/Family Education  Outcome: Not Progressing Towards Goal. Pt was schedule for 4 hours TX. Patients states. \"I only want 3 hours\". He has been educated several times of the risks  of cutting hours with no success.

## 2022-07-02 NOTE — PROGRESS NOTES
8100  Pt is awake, alert, oriented x 4. Pt has Dialysis cath to right chest wall. Pt has 3 lumen cath to right groin. Pt has dressing to DINAH old graft site. Lungs are clear. Abdomen soft with active BS. Pt denies difficulty breathing. 1118   Old dressings to DINAH was removed. With small amt of serosanginous drainage on old dressing. Incision intact with sutures. Xeroform dressing applied. 2 open areas packed with Iodoform gauze. Gauze, abd, and Kerlix dressings applied then Ace wrap dressing applied. 1800   Pt vomited large amt of partially digested food and had  Large amt of loose brownish stools in BSC. Pt was kept clean and assisted back in bed. Zofran 4 mg IV given. 1857   Pt had another loose BM via bedpan. Kept clean.

## 2022-07-02 NOTE — PROGRESS NOTES
1930 At change of shift the patient is receiving dialysis. He denies any needs and will call if anything changes. Meds will be given once dialysis is complete. 2230 The pt has completed dialysis and appears to have tolerated it well. 2252 All evening medications administered. Pt denies any needs. 0155 Administered 20mg IV hydralazine for a BP of 185/62 as is ordered prn for SBP >160. The pt is resting in bed quietly and denies any needs at this time. [id number] Shift Summary: The pt rested well overnight with no complaints. No new clinical concerns noted.      Nightshift Chart Audit Completed

## 2022-07-02 NOTE — PROGRESS NOTES
TREATMENT SUMMARY   Patient in room 342 dialyzed for 3 hours. Tolerated tx well with without complaint or complications. Right TDC functioning without complication accessing or BFR    [id number]  2500 ml UF removed with a net UF removal of 2000 ml. Report given to Marilee Joshi RN with all questions answered. TREATMENT NOTES       Received report from [address] at 0483. TX was initiated while patient was in bed without difficulty. Aseptically, the CVC ports were accesed and flushed without problem. Treatment was started without reversing the lines. Will continue to monitor patients closely during treatment. ACUTE HEMODIALYSIS FLOW SHEET       PATIENT INFORMATION   [facility]       DR. Landry    []1st Time Acute  []Stat[x] Routine []Urgent []Chronic Unit   []Acute Room []Bedside  []ICU/CCU []ER   Isolation Precautions: []Dialysis[] Airborne []Contact []Droplet []Reverse    Special Considerations:_______  [] Blood Consent Verified  []N/A   Allergies:[] NKA                 [] _____________ Code Status [x]Full Code [] DNR  [] Other_____   Diet: [] Renal [] NPO [] Diabetic   [] Enteral Feeding [] Cardiac Diabetic: [x]Yes []No     [x]Signed Treatment Consent Verified   [x] Time Out/ Safety Check 1855   PRIMARY NURSE REPORT: FIRST INITIAL/ LAST NAME/TITLE  PRE DIALYSIS:  ANITHA Markham                                         TIME:1830   ACCESS   CATHETER ACCESS: [] N/A  [x] RIGHT  [] LEFT  [x] IJ  [] SUBCL [] FEM                    [] First use X-ray  [] Tunnel     [] Non-Tunneled      [x] No S/S infection  [] Redness [] Drainage  [] Cultured [] Swelling [] Pain                    [] Medical Aseptic [] Prep Dressing Changed                  [] Clotted [] Patent []      Flows: [x] Good [] Poor [] Reversed                 If Access Problem Dr. Riaz Jovel: [] Yes [] No    Date:_____  [] N/A[] GRAFT/FISTULA ACCESS:  [] N/A  [] RIGHT  [] LEFT  [] UE   [] LE       [] AVG  [] AVF [] BUTTONHOLE    [] +BRUIT/THRILL [] MEDICAL ASEPTIC PREP     [] No S/S infection  [] Redness [] Drainage  [] Cultured [] Swelling [] Pain              If Access Problem Dr. Garza Gains: [] Yes [] No    Date:______ [] N/A   GENERAL ASSESSMENT   LUNGS:  SaO2% ____ [x] Clear [] Coarse [] Crackles [] Wheezing               [] Diminished Location: [] RLL [] LLL [] RUL [] KYLAH    COUGH:  [] Productive  [] Loose[] Dry [] N/A  RESPIRATIONS: [] Easy [] Labored    THERAPY: [x] RA   [] NC _____. L/min    Mask: [] NRB [] Venti  _____O2%                  [] Ventilator [] Intubated [] Trach [] BiPap [] CPap [] HI Flow   CARDIAC: [x] Regular [] Irregular [] Pericardial Rub [] JVD               Monitored Rhythm:______ [] N/A   EDEMA: [x] None [] Generalized [] Facial [] Pedal [] UE [] LE             [] Pitting [] 1 [] 2 [] 3 [] 4    [] Right [] Left [] Bilateral   SKIN:    [x] Warm [] Hot [] Cold  [] Dry [] Pale [] Diaphoretic              [] Flushed [] Jaundiced [] Cyanotic [] Rash [] Weeping     LOC:    [x] Alert  Oriented to: [] Person [] Place [] Time             [] Confused [] Lethargic [] Medicated [] Non-responsive    GI/ABDOMEN: [] Flat [] Distended [x] Soft [] Firm [] Diarrhea [x] Bowel Sounds Present [] Nausea [] Vomiting    PAIN: [x] 0 [] 1 [] 2 [] 3 [] 4 [] 5 [] 6 [] 7 [] 8 [] 9 [] 10          Scale 1-10 Action/Follow Up_____   MOBILITY: [] Amb [x] Amb/Assist [] Bed  [] Wheelchair    CURRENT LABS   HBsAg ONLY: Date Drawn:  06.1.22           [x] Negative [] Positive [] Unknown.      HBsAb: Date Drawn: 06.1.22             [] Susceptible <10 [x] Immune ?10 [] Unknown   Date of Current Labs:  ATTACHED     EDUCATION   Person Educated: [] Patient [] Other_________   Knowledge base: [] None [x] Minimal [] Substantial    Barriers to learning  [x] None _______________   Preferred method of learning: [x] Written [] Oral [x] Visual [] Hands on Topic: [] Access Care [] S&S of infection [x] Fluid Management   [x] K+  [x] Procedural  [] Albumin [] Medications [] Tx Options   [] Transplant [] Diet [] Other    Teaching Tools: [] Explain [] Demonstration [] Handout_____ [] Video______  CARE PLAN    [x] Renal Failure (Adult)  Interdisciplinary  · Fluid and electrolytes stabilized  ? Interventions  · Dehydration signs and symptoms (eg: Weight/lab monitoring; vomiting/diarrhea/urine; tenting; mucous membranes; dizziness/lethargy/irritability/confusion; weak pulse; tachycardia; blood pressure; I&O)  · Fluid overload signs and symptoms assessment (eg: Body weight increased; dyspnea; edema; hypertension; respiratory crackles/wheezing; JVD; lab monitoring; mental status changes; I&O)  · Monitor appropriate lab values  · COMPLIANCE WITH PRESCRIBED THERAPY  · ARTERIAL ACCESS SITE ASSESSMENT  · NUTRITION SCREENING  · Vital signs monitoring per assessed patient condition or unit standard  · Cardiac monitoring  · Hydration management  · Intake and output measurement  · Body weight monitoring  · Skin care  · DIALYSIS  · Nutrition Care Process per nutrition screen  · Oral hygiene care every 2 hours  · Pain management     · Outcome   ? [x] Progressing Towards Goal  ? [] Not Progressing Towards Goals  ? [] Goals Met/Resolved  ? [] Goals Not Met/ Resolved        · Patient/ Family Education  ?  Progressing Towards Goals          RO/HEMODIAYLSIS MACHINE SAFETY CHECKS- BEFORE EACH TREATMENT          [x] [facility]: Machine Serial #1:  [id number]   RO Serial #1: [id number]                       [] [facility]: Machine Serial #2:  [id number]   RO Serial #2: [id number]        [] [facility]: Machine Serial #3:  [id number]    Serial #[id number]    Alarm Test: [x] Pass  Time_1847  [x] RO/Machine Log Complete    [x] Extracorporeal circuit Tested for integrity           Dialyzer_[id number]   Tubing 83C86-07   Dialysate: pH_7.4_  Temp._36.0 Conductivity: Meter _14.0 HD Machine 14.0_   CHLORINE TESTING- BEFORE EACH TREATMENT AND EVERY 4 HOURS   Total Chlorine: [x] Less than 0.1 ppm Time:1845 ,2nd Check Time:______  (If greater than 0.1 ppm from Primary then every 30 minutes from Secondary)   TREATMENT INIATION-WITH DIALYSIS PRECAUTIONS   [x] All Connections Secured   [x] Saline Line Double Clamped    [x] Venous Parameters Set [x] Arterial Parameters Set    [x] Prime Given 250 ml     [x] Air Foam Detector Engaged   PRE-TREATMENT   UF Calculations: Wt to lose:2000____ml(+) Oral:__ml(+)IV Meds/Fluids/Blood prods___ml(+) Prime/Rinse_500__ml(=)Total UF Goal__2500__mL   Scale Type:[x] Bed scale [] Sling Scale [] Wheel Chair Scale []  Not Ordered [] [] Unable to obtain pt on stretcher/ bed scale malfunctioning      [x] Time Out/Safety Check  Time:_1855   INTRADIALYTIC MONITORING  (SEE ATTACHED FLOWSHEET)     POST TREATMENT    Time Medication Dose Volume Route    Initials                                           [facility] Signatures Title Initials Time                     Dialyzer cleared: [x] Good [] Fair [] Poor     Blood Processed _75__Liters    Net UF Removed 2000____mL  Post Tx Access:                  AVF/AVG: Bleeding Stop       Art.___min Chaim.____min []+bruit/thrill                Catheter: Locking Solution [x] Heparin 1 ml/1000 units  [] Normal Saline                                                                               Art.1.8_____ ml Chaim._1.8____ml  Post Assessment:              Skin: [x]Warm []Dry []Diaphoretic []Flushed [] Pale []Cyanotic            Lungs: [x]Clear []Coarse []Crackles []Wheezing             Cardiac: [x]Regular []Irregular  []Monitored rhythm____ []N/A            Edema: [x]None []General []Facial []Pedal  []UE []LE []RIGHT []LEFT            Pain: [x]0 []1 []2 []3 []4 []5 []6 []7 []8 []9 []10   POST Tx Note:Patient in room 342 dialyzed for 3 hours. Tolerated tx well with without complaint or complications.   Right TDC functioning without complication accessing or BFR    [id number]  2500 ml UF removed with a net UF removal of 2000 ml. Report given to SANDRA Rivera RN with all questions answered. Primary Nurse Report: First initial/Last name/Title    Post Dialysis:_SANDRA Springer_         Time:_2220    Abbreviations: AVG-arterial venous graft, AVF-arterial venous fistula, IJ-Internal Jugular,  Subcl-Subclavian, Fem-Femoral, Tx-treatment, AP/HR-apical heart rate, DFR-dialysate flow rate, BFR-blood flow rate, AP-arterial pressure, -venous pressure, UF-ultrafiltrate, TMP-transmembrane pressure, Chaim-Venous, Art-Arterial, RO-Reverse Osmosis

## 2022-07-02 NOTE — ROUTINE PROCESS
1922  Bedside and Verbal shift change report given to Kayleen Headley RN (oncoming nurse) by Anabel Garcia RN (offgoing nurse). Report included the following information SBAR, Kardex and MAR.

## 2022-07-02 NOTE — ROUTINE PROCESS
Bedside and Verbal shift change report given to Rocio Gautam RN  (oncoming nurse) by Jeanette Flores RN  (offgoing nurse). Report given with SBAR, Kardex, Intake/Output and Recent Results.

## 2022-07-03 LAB
GLUCOSE BLD STRIP.AUTO-MCNC: 133 MG/DL (ref 70–110)
GLUCOSE BLD STRIP.AUTO-MCNC: 78 MG/DL (ref 70–110)
GLUCOSE BLD STRIP.AUTO-MCNC: 97 MG/DL (ref 70–110)
GLUCOSE BLD STRIP.AUTO-MCNC: 99 MG/DL (ref 70–110)

## 2022-07-03 PROCEDURE — 97530 THERAPEUTIC ACTIVITIES: CPT

## 2022-07-03 PROCEDURE — 82962 GLUCOSE BLOOD TEST: CPT

## 2022-07-03 PROCEDURE — 65270000046 HC RM TELEMETRY

## 2022-07-03 PROCEDURE — 74011250636 HC RX REV CODE- 250/636: Performed by: HOSPITALIST

## 2022-07-03 PROCEDURE — 97116 GAIT TRAINING THERAPY: CPT

## 2022-07-03 PROCEDURE — 74011250637 HC RX REV CODE- 250/637: Performed by: HOSPITALIST

## 2022-07-03 PROCEDURE — 74011250637 HC RX REV CODE- 250/637: Performed by: INTERNAL MEDICINE

## 2022-07-03 PROCEDURE — 74011250637 HC RX REV CODE- 250/637: Performed by: STUDENT IN AN ORGANIZED HEALTH CARE EDUCATION/TRAINING PROGRAM

## 2022-07-03 RX ADMIN — HEPARIN SODIUM 5000 UNITS: 5000 INJECTION INTRAVENOUS; SUBCUTANEOUS at 10:34

## 2022-07-03 RX ADMIN — SEVELAMER CARBONATE 1200 MG: 2400 POWDER, FOR SUSPENSION ORAL at 17:00

## 2022-07-03 RX ADMIN — Medication 500 MG: at 10:50

## 2022-07-03 RX ADMIN — CARVEDILOL 12.5 MG: 12.5 TABLET, FILM COATED ORAL at 17:42

## 2022-07-03 RX ADMIN — HEPARIN SODIUM 5000 UNITS: 5000 INJECTION INTRAVENOUS; SUBCUTANEOUS at 02:55

## 2022-07-03 RX ADMIN — ASPIRIN 81 MG: 81 TABLET, COATED ORAL at 10:50

## 2022-07-03 RX ADMIN — SEVELAMER CARBONATE 1200 MG: 2400 POWDER, FOR SUSPENSION ORAL at 12:00

## 2022-07-03 RX ADMIN — CYANOCOBALAMIN TAB 1000 MCG 1000 MCG: 1000 TAB at 10:50

## 2022-07-03 RX ADMIN — Medication 500 MG: at 22:10

## 2022-07-03 RX ADMIN — FUROSEMIDE 80 MG: 40 TABLET ORAL at 10:50

## 2022-07-03 RX ADMIN — DOXAZOSIN 2 MG: 1 TABLET ORAL at 10:50

## 2022-07-03 RX ADMIN — FUROSEMIDE 80 MG: 40 TABLET ORAL at 22:10

## 2022-07-03 RX ADMIN — ISOSORBIDE MONONITRATE 60 MG: 60 TABLET, EXTENDED RELEASE ORAL at 10:50

## 2022-07-03 RX ADMIN — SEVELAMER CARBONATE 1200 MG: 2400 POWDER, FOR SUSPENSION ORAL at 10:50

## 2022-07-03 RX ADMIN — CARVEDILOL 12.5 MG: 12.5 TABLET, FILM COATED ORAL at 10:50

## 2022-07-03 RX ADMIN — PANTOPRAZOLE SODIUM 40 MG: 40 TABLET, DELAYED RELEASE ORAL at 06:31

## 2022-07-03 NOTE — PROGRESS NOTES
Hospitalist Progress Note    Patient: Floresita Bustos MRN: [id number]  CSN: [id number]    YOB: 1970  Age: 50 y.o. Sex: male    DOA: 6/20/2022 LOS:  LOS: 13 days                Assessment/Plan     Patient Active Problem List   Diagnosis Code    Iron deficiency anemia D50.9    Other restrictive cardiomyopathy ([facility] I42.5    ESRD (end stage renal disease) on dialysis ([facility] N18.6, Z99.2    HTN (hypertension) I10    Hx of BKA, right ([facility] Z89.511    Uremia N19    Non-compliance with renal dialysis ([facility] Z91.15    Hypertension I10    COVID-19 U07.1    Pulmonary edema J81.1    Volume overload E87.70    Sepsis ([facility] A41.9    Bacteremia R78.81    Arteriovenous graft infection ([facility] T82. 7XXA        Chief complaint :  Fever, cough, congestion  54 y.o. male with end-stage renal disease, diabetes, CHF, hypertension, s/p right BKA presents to ER with concerns of not feeling well, fever congestion cough and shortness of breath. Sepsis -  POA  Secondary to bacteremia  Source TDC vs AVG    Persistent Bacteremia -  Blood cultures growing MSSA  Blood cultures from 06/29 with coag negative staph, likely contaminant. repeat cultures negative. On cefazolin. ID following. Diarrhea now better, continue with probiotics. Monitor for CDI. LUE non functioning AVG   source of infection  Vascular consulted  S/p excision of infected AV graft left upper arm. S/p TDC placement. Volume overload/pulmonary edema-  Resolved with Fluid removal with HD     ESRD-  Nephrology following  HD per nephrology     Hypertension-  Monitor BP  Continue with home medications.     COVID 19-  Not hypoxic, monitor  Repeat rapid COVID 19 positive.     DM-  Started on sliding scale insulin, ADA diet. Blood sugars low, stop lantus.     Right BKA     DVT prophylaxis with heparin       Disposition : discharge planning    Review of systems  General: No fevers or chills. Cardiovascular: No chest pain or pressure.  No palpitations. Pulmonary: No shortness of breath. Gastrointestinal: No nausea, vomiting. Physical Exam:  General: Awake, cooperative, no acute distress    HEENT: NC, Atraumatic. PERRLA, anicteric sclerae. Lungs: CTA Bilaterally. No Wheezing/Rhonchi/Rales. Heart:  S1 S2,  No murmur, No Rubs, No Gallops  Abdomen: Soft, Non distended, Non tender.  +Bowel sounds,   Extremities: Right BKA, tender LUE AVG site  Psych:   Not anxious or agitated. Neurologic:  No acute neurological deficit. Vital signs/Intake and Output:  Visit Vitals  BP (!) 183/97   Pulse 85   Temp 98.1 °F (36.7 °C)   Resp 18   Ht 5' 7\" (1.702 m)   Wt 73.4 kg (161 lb 13.1 oz)   SpO2 97%   BMI 25.34 kg/m²     Current Shift:  07/03 0701 - 07/03 1900  In: 360 [P.O.:360]  Out: -   Last three shifts:  07/01 1901 - 07/03 0700  In: -   Out: 2000             Labs: Results:       Chemistry No results for input(s): GLU, NA, K, CL, CO2, BUN, CREA, CA, AGAP, BUCR, TBIL, AP, TP, ALB, GLOB, AGRAT in the last 72 hours. No lab exists for component: GPT   CBC w/Diff No results for input(s): WBC, RBC, HGB, HCT, PLT, GRANS, LYMPH, EOS, HGBEXT, HCTEXT, PLTEXT, HGBEXT, HCTEXT, PLTEXT in the last 72 hours. Cardiac Enzymes No results for input(s): CPK, CKND1, WILLIE in the last 72 hours. No lab exists for component: CKRMB, TROIP   Coagulation No results for input(s): PTP, INR, APTT, INREXT, INREXT in the last 72 hours. Lipid Panel Lab Results   Component Value Date/Time    Cholesterol, total 115 06/02/2022 09:45 AM    HDL Cholesterol 62 (H) 06/02/2022 09:45 AM    LDL, calculated 46 06/02/2022 09:45 AM    VLDL, calculated 7 06/02/2022 09:45 AM    Triglyceride 35 06/02/2022 09:45 AM    CHOL/HDL Ratio 1.9 06/02/2022 09:45 AM      BNP No results for input(s): BNPP in the last 72 hours. Liver Enzymes No results for input(s): TP, ALB, TBIL, AP in the last 72 hours.     No lab exists for component: SGOT, GPT, DBIL   Thyroid Studies Lab Results Component Value Date/Time    TSH 2.38 08/29/2021 01:20 AM        Procedures/imaging: see electronic medical records for all procedures/Xrays and details which were not copied into this note but were reviewed prior to creation of Plan

## 2022-07-03 NOTE — PROGRESS NOTES
@2000 pt taken over  Awake alert sitting up in bed on room air denies pain . Assessment completed and documented in appropriate flow sheets. Nursing manage ment continues. @0000 pt reassessed no changes. @0400 pt asleep easily aroused, assessment completed no changes care continues. @5918 Bedside and Verbal shift change report given to [id number] (oncoming nurse) by Fronie Goldmann (offgoing nurse). Report included the following information SBAR, Kardex, Intake/Output, MAR, Recent Results, Med Rec Status and Alarm Parameters .

## 2022-07-03 NOTE — PROGRESS NOTES
Problem: Mobility Impaired (Adult and Pediatric)  Goal: *Acute Goals and Plan of Care (Insert Text)  Description: Physical Therapy Goals  Initiated 6/30/2022 and to be accomplished within 7 day(s)  1. Patient will move from supine to sit and sit to supine  in bed with independence. 2.  Patient will transfer from bed to chair and chair to bed with modified independence using the least restrictive device. 3.  Patient will perform sit to stand with modified independence. 4.  Patient will ambulate with modified independence for 50 feet with the least restrictive device. Outcome: Progressing Towards Goal   physical Therapy TREATMENT    Patient: Bashir Lyons (50 y.o. male)  Date: 7/3/2022  Diagnosis: COVID-19 [U07.1] Pulmonary edema  Procedure(s) (LRB):  EXCISION OF INFECTED AV GRAPH LEFT UPPER ARM,TEMPORARY  DIALYSIS CATHETER PLACEMENT RIGHT LEG, REPAIR BRACIAL ARTERY WITH PATCH, HARVEST OF BASILLIC VEIN. (N/A) 9 Days Post-Op  Precautions: Fall,WBAT   Chart, physical therapy assessment, plan of care and goals were reviewed. ASSESSMENT:  PT found on bedpan. He reports have episodes of hypotension early in AM (Not during this session). Pt assisted with virgilio-care and required assist with supine to sit transition. Pt I able to alyse prosthetic, stand and amb with use of rollator (No physical assistance). Amb distance increased, without balance loss or significant SOB. Pt report family's plan to hire an aide/ to assist with general care. Pt will benefit from continued IPPT to reduce supine to sit independence. Progression toward goals:  [x]      Improving appropriately and progressing toward goals  []      Improving slowly and progressing toward goals  []      Not making progress toward goals and plan of care will be adjusted     PLAN:  Patient continues to benefit from skilled intervention to address the above impairments. Continue treatment per established plan of care.   Discharge Recommendations:  Home Physical Therapy with aide/  Further Equipment Recommendations for Discharge:  rolling walker     SUBJECTIVE:   Patient stated I could walk around my whole complex, before all this.     OBJECTIVE DATA SUMMARY:   Critical Behavior:  Neurologic State: Alert,Appropriate for age  Orientation Level: Oriented X4  Cognition: Appropriate decision making,Appropriate for age attention/concentration,Appropriate safety awareness  Safety/Judgement: Fall prevention  Functional Mobility Training:  Bed Mobility:  Rolling: Supervision  Supine to Sit: Minimum assistance  Sit to Supine: Supervision  Scooting: Supervision  Transfers:  Sit to Stand: Stand-by assistance  Stand to Sit: Stand-by assistance  Balance:  Sitting: Intact  Standing: Intact; With support  Ambulation/Gait Training:  Distance (ft): 90 Feet (ft)  Assistive Device: Gait belt;Walker, rollator  Ambulation - Level of Assistance: Stand-by assistance  Gait Abnormalities: Decreased step clearance  Speed/Lottie: Slow  Pain:  Pain Scale 1: Numeric (0 - 10)  Pain Intensity 1: 0  Pain out: 0  Activity Tolerance:   Fair+  Please refer to the flowsheet for vital signs taken during this treatment.   After treatment:   [] Patient left in no apparent distress sitting up in chair  [x] Patient left in no apparent distress in bed  [x] Call bell left within reach  [x] Nursing notified  [] Caregiver present  [] Bed alarm activated      Juan C Callaway PTA   Time Calculation: 31 mins

## 2022-07-03 NOTE — PROGRESS NOTES
Problem: Falls - Risk of  Goal: *Absence of Falls  Description: Document Gilma Vidhya Fall Risk and appropriate interventions in the flowsheet. Outcome: Progressing Towards Goal  Note: Fall Risk Interventions:  Mobility Interventions: Strengthening exercises (ROM-active/passive),Patient to call before getting OOB         Medication Interventions: Evaluate medications/consider consulting pharmacy,Patient to call before getting OOB    Elimination Interventions: Call light in reach,Patient to call for help with toileting needs,Toileting schedule/hourly rounds    History of Falls Interventions: Evaluate medications/consider consulting pharmacy         Problem: Patient Education: Go to Patient Education Activity  Goal: Patient/Family Education  Outcome: Progressing Towards Goal     Problem: Pressure Injury - Risk of  Goal: *Prevention of pressure injury  Description: Document Tucker Scale and appropriate interventions in the flowsheet. Outcome: Progressing Towards Goal  Note: Pressure Injury Interventions:  Sensory Interventions: Assess changes in LOC,Avoid rigorous massage over bony prominences,Discuss PT/OT consult with provider,Float heels,Keep linens dry and wrinkle-free,Maintain/enhance activity level,Minimize linen layers,Monitor skin under medical devices,Pad between skin to skin,Pressure redistribution bed/mattress (bed type),Turn and reposition approx. every two hours (pillows and wedges if needed)    Moisture Interventions: Absorbent underpads,Check for incontinence Q2 hours and as needed,Maintain skin hydration (lotion/cream),Minimize layers,Moisture barrier,Offer toileting Q_hr,Limit adult briefs    Activity Interventions: Pressure redistribution bed/mattress(bed type),PT/OT evaluation    Mobility Interventions: Float heels,HOB 30 degrees or less,Pressure redistribution bed/mattress (bed type),PT/OT evaluation,Turn and reposition approx.  every two hours(pillow and wedges)    Nutrition Interventions: Document food/fluid/supplement intake    Friction and Shear Interventions: Apply protective barrier, creams and emollients,Feet elevated on foot rest,Foam dressings/transparent film/skin sealants,HOB 30 degrees or less,Lift sheet,Lift team/patient mobility team,Minimize layers,Transferring/repositioning devices                Problem: Patient Education: Go to Patient Education Activity  Goal: Patient/Family Education  Outcome: Progressing Towards Goal     Problem: Airway Clearance - Ineffective  Goal: Achieve or maintain patent airway  Outcome: Progressing Towards Goal     Problem: Gas Exchange - Impaired  Goal: Absence of hypoxia  Outcome: Progressing Towards Goal  Goal: Promote optimal lung function  Outcome: Progressing Towards Goal     Problem: Breathing Pattern - Ineffective  Goal: Ability to achieve and maintain a regular respiratory rate  Outcome: Progressing Towards Goal     Problem:  Body Temperature -  Risk of, Imbalanced  Goal: Ability to maintain a body temperature within defined limits  Outcome: Progressing Towards Goal  Goal: Will regain or maintain usual level of consciousness  Outcome: Progressing Towards Goal  Goal: Complications related to the disease process, condition or treatment will be avoided or minimized  Outcome: Progressing Towards Goal     Problem: Isolation Precautions - Risk of Spread of Infection  Goal: Prevent transmission of infectious organism to others  Outcome: Progressing Towards Goal     Problem: Nutrition Deficits  Goal: Optimize nutrtional status  Outcome: Progressing Towards Goal     Problem: Risk for Fluid Volume Deficit  Goal: Maintain normal heart rhythm  Outcome: Progressing Towards Goal  Goal: Maintain absence of muscle cramping  Outcome: Progressing Towards Goal  Goal: Maintain normal serum potassium, sodium, calcium, phosphorus, and pH  Outcome: Progressing Towards Goal     Problem: Loneliness or Risk for Loneliness  Goal: Demonstrate positive use of time alone when socialization is not possible  Outcome: Progressing Towards Goal     Problem: Fatigue  Goal: Verbalize increase energy and improved vitality  Outcome: Progressing Towards Goal     Problem: Patient Education: Go to Patient Education Activity  Goal: Patient/Family Education  Outcome: Progressing Towards Goal     Problem: Chronic Renal Failure  Goal: *Fluid and electrolytes stabilized  Outcome: Progressing Towards Goal     Problem: Patient Education: Go to Patient Education Activity  Goal: Patient/Family Education  Outcome: Progressing Towards Goal     Problem: Risk for Spread of Infection  Goal: Prevent transmission of infectious organism to others  Description: Prevent the transmission of infectious organisms to other patients, staff members, and visitors.   Outcome: Progressing Towards Goal     Problem: Patient Education:  Go to Education Activity  Goal: Patient/Family Education  Outcome: Progressing Towards Goal     Problem: Patient Education: Go to Patient Education Activity  Goal: Patient/Family Education  Outcome: Progressing Towards Goal     Problem: Patient Education: Go to Patient Education Activity  Goal: Patient/Family Education  Outcome: Progressing Towards Goal

## 2022-07-03 NOTE — PROGRESS NOTES
CM notified that the positive blood culture from 7/1 was a contaminant. As today is Sunday and [employer] is closed, CM can not confirm that the HD clinc will have the ABX on hand if patient were to discharge before getting dialysis tomorrow. Patient also ahs a daily dressing change that he states he has \"no friends or family to help with. \" For this readon CM has been seeking rehab placement but the patient's positive rapid Covid on 6/29 is a barrier to placement and he is not considered complex enoough for [facility]. CM reached out to patient and asked him to reach out to his friends and family to ask if someone could be educated on the dressing change and CM reached out to nursing to ask that the patient attempt dressing change today with RN supervision and instruction using the teachback method. CM will contact [facility] in the morning to confirm they are prepared for the patient's ABX needs.

## 2022-07-03 NOTE — PROGRESS NOTES
[facility]  (A Division of [address] [place] [address])                                                                                                                      Sonny Manzanares MD  Office #: - Option # [phone]  Fax #: [phone]     Date of Admission: 6/20/2022Date of Note: 7/3/2022  Reason for Referral: Evaluation and antibiotic management of bacteremia/COVID 19 Infection. 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 Proximal vein part well incorporated and overswen  Right temp HD cath placed  · Asymptomatic COVID 19 Infection-- doubt viral PNA  --fully vaccinared and boosted 11/2021  --not clear when viral infection was acquired  · Pul edema Vs PNA-- had few missed HD sessions  · Occluded L AVG  · History of C.diff in the past    Other Medical Issues- Mx per respective team:  ESRD on HD  DM  R BKA  Hx of AMS with severe Hypoglycemia== recent admission      Recommendation for ID issues I am following:  ------------------------------------------------------------------------------    Cont same as below  --CoNS bacteremia is contamination 6/29  Wound care for L arm-per vascular  Removal of R femoral TLC -soon. Timing per primary team    Monitor for diarrhea-promptly start CDI treatment if loose bm with increasing wbc  Cont probiotics bid    -- DC isolation in 10 days from 1st test-- he is asymptomatic    Cefazolin 2/2/3 gm post HD MWF- X6 weeks from 6/24/22.  End date: August 4  --Appreciate Renal MD help with arranging ABX post HD  --CBC with Diff Q O weekly    Fax labs to Dr Gina Pratt-- [phone] with me in [facility] August DW Dr Lisa Quiroz       Subjective:  Pt seen, no complains from pt    Review of systems:  No F/C/R  No N/V  No cough/sob/chest pain  No abd pain/cramps or diarrhea  No joint swelling/pain  No itching or rash       HPI:  Caryle Porto is a 50 y.o. BLACK/ with PMH of ESRD on HD, DM. Recently admitted to [facility] with AMS and hypoglycemia. During his stay, he had underwent vascular study for occlusion on left arm, and was not able to use it, and R TDC was placed. He had missed couple of his HD sessions and he was feeling body aches and SOB and had cough with clear mucus. He came in to ED, felt he could have PNA. Other associated symptoms include N/V and loose BM without   Had high BP to 208/101, fever to 102.1 after admission. .  Currently on Vanco and Cefepime. Reports occasional pain on L arm. No abd pain. Admission CXR with cardiomegaly w/mild pul edema. WBC at 12K, no bands           Active Hospital Problems    Diagnosis Date Noted    Sepsis ([address] 06/22/2022    Bacteremia 06/22/2022    Arteriovenous graft infection ([address] 06/22/2022    COVID-19 06/20/2022    Pulmonary edema 06/20/2022    Volume overload 06/20/2022    ESRD (end stage renal disease) on dialysis ([address] 01/29/2020     Past Medical History:   Diagnosis Date    Chronic kidney disease     Diabetes ([address]     Heart failure ([address]     Hemodialysis patient ([address]     Hypertension     Sickle cell trait ([address]      Past Surgical History:   Procedure Laterality Date    HX ORTHOPAEDIC      right BKA 2017    IR DECLOT AV GRAFT PERCUTANEOUS  6/3/2022    IR INSERT NON TUNL CVC OVER 5 YRS  6/1/2022     History reviewed. No pertinent family history.   Social History     Socioeconomic History    Marital status: LEGALLY      Spouse name: Not on file    Number of children: Not on file    Years of education: Not on file    Highest education level: Not on file   Occupational History    Not on file   Tobacco Use    Smoking status: Former Smoker    Smokeless tobacco: Never Used   Substance and Sexual Activity    Alcohol use: No    Drug use: No    Sexual activity: Not on file   Other Topics Concern    Not on file   Social History Narrative    Not on file     Social Determinants of Health     Financial Resource Strain:     Difficulty of Paying Living Expenses: Not on file   Food Insecurity:     Worried About Running Out of Food in the Last Year: Not on file    Danii of Food in the Last Year: Not on file   Transportation Needs:     Lack of Transportation (Medical): Not on file    Lack of Transportation (Non-Medical):  Not on file   Physical Activity:     Days of Exercise per Week: Not on file    Minutes of Exercise per Session: Not on file   Stress:     Feeling of Stress : Not on file   Social Connections:     Frequency of Communication with Friends and Family: Not on file    Frequency of Social Gatherings with Friends and Family: Not on file    Attends Taoist Services: Not on file    Active Member of [address] [place] [address] or Organizations: Not on file    Attends Club or Organization Meetings: Not on file    Marital Status: Not on file   Intimate Partner Violence:     Fear of Current or Ex-Partner: Not on file    Emotionally Abused: Not on file    Physically Abused: Not on file    Sexually Abused: Not on file   Housing Stability:     Unable to Pay for Housing in the Last Year: Not on file    Number of [place] in the Last Year: Not on file    Unstable Housing in the Last Year: Not on file       Allergies:  Penicillins and Vancomycin     Medications:  Current Facility-Administered Medications   Medication Dose Route Frequency    ceFAZolin (ANCEF) 2 g/20 mL in sterile water IV syringe  2 g IntraVENous Once per day on Mon Wed    ceFAZolin (ANCEF) 3 g/30 mL in sterile water IV syringe  3 g IntraVENous every Friday    Saccharomyces boulardii (FLORASTOR) capsule 500 mg  500 mg Oral BID    diphenhydrAMINE (BENADRYL) injection 25-50 mg  25-50 mg IntraVENous Rad Multiple    naloxone (NARCAN) injection 0.4 mg  0.4 mg IntraVENous PRN    HYDROmorphone (DILAUDID) injection 0.5 mg  0.5 mg IntraVENous Q3H PRN    oxyCODONE-acetaminophen (PERCOCET) 5-325 mg per tablet 1-2 Tablet  1-2 Tablet Oral Q6H PRN    epoetin delonte-epbx (RETACRIT) injection 20,000 Units  20,000 Units SubCUTAneous Q MON, WED & FRI    sevelamer carbonate (RENVELA) oral powder 1,200 mg  1,200 mg Oral TID WITH MEALS    ondansetron (ZOFRAN) injection 4 mg  4 mg IntraVENous Q8H PRN    acetaminophen (TYLENOL) tablet 650 mg  650 mg Oral Q6H PRN    Or    acetaminophen (TYLENOL) suppository 650 mg  650 mg Rectal Q6H PRN    guaiFENesin-dextromethorphan (ROBITUSSIN DM) 100-10 mg/5 mL syrup 5 mL  5 mL Oral Q4H PRN    sodium chloride (NS) flush 5-10 mL  5-10 mL IntraVENous PRN    carvediloL (COREG) tablet 12.5 mg  12.5 mg Oral BID WITH MEALS    calcitRIOL (ROCALTROL) capsule 0.25 mcg  0.25 mcg Oral Q MON, WED & FRI    aspirin delayed-release tablet 81 mg  81 mg Oral DAILY    cyanocobalamin tablet 1,000 mcg  1,000 mcg Oral DAILY    doxazosin (CARDURA) tablet 2 mg  2 mg Oral DAILY    furosemide (LASIX) tablet 80 mg  80 mg Oral BID    isosorbide mononitrate ER (IMDUR) tablet 60 mg  60 mg Oral DAILY    pantoprazole (PROTONIX) tablet 40 mg  40 mg Oral ACB    glucose chewable tablet 16 g  16 g Oral PRN    glucagon (GLUCAGEN) injection 1 mg  1 mg IntraMUSCular PRN    insulin lispro (HUMALOG) injection   SubCUTAneous AC&HS    dextrose 10% infusion 0-250 mL  0-250 mL IntraVENous PRN    hydrALAZINE (APRESOLINE) 20 mg/mL injection 20 mg  20 mg IntraVENous Q6H PRN    heparin (porcine) injection 5,000 Units  5,000 Units SubCUTAneous Q8H    heparin (porcine) 1,000 unit/mL injection 3,600 Units  3,600 Units Hemodialysis DIALYSIS PRN      Physical Exam:    Temp (24hrs), Av.3 °F (36.8 °C), Min:97.9 °F (36.6 °C), Max:98.5 °F (36.9 °C)    Visit Vitals  BP (!) 167/65   Pulse 85   Temp 98.3 °F (36.8 °C)   Resp 18   Ht 5' 7\" (1.702 m)   Wt 73.4 kg (161 lb 13.1 oz)   SpO2 100%   BMI 25.34 kg/m²     R chest  TDC in place--removed   New TDC placed   R groin-- TLC cath in place, dressing over the line still in place     GEN: WD Chronically ill looking - on 2L NC  not in resp distress. HEENT: Unicteric. EOMI intact  No neck swelling  CHEST: Non laboured breathing. CVS:RRR, no mur/gallop  ABD: Obese/soft. Non tender. Non distended  Skin: Dry and intact. No rash, no redness. CNS: CN grossly ok. Microbiology  All Micro Results     Procedure Component Value Units Date/Time    CULTURE, BLOOD [[id number]] Collected: 22 0707    Order Status: Completed Specimen: Blood Updated: 22     Special Requests: NO SPECIAL REQUESTS        Culture result: NO GROWTH 2 DAYS       CULTURE, BLOOD [[id number]] Collected: 22 0714    Order Status: Completed Specimen: Blood Updated: 22     Special Requests: NO SPECIAL REQUESTS        Culture result: NO GROWTH 2 DAYS       CULTURE, BLOOD [[id number]] Collected: 22 0726    Order Status: Completed Specimen: Blood Updated: 22 0712     Special Requests: NO SPECIAL REQUESTS        Culture result: NO GROWTH 6 DAYS       CULTURE, BLOOD [[id number]]  (Abnormal) Collected: 22 1705    Order Status: Completed Specimen: Blood Updated: 22 0635     Special Requests: NO SPECIAL REQUESTS        GRAM STAIN       AEROBIC BOTTLE GRAM POSITIVE COCCI IN CLUSTERS                  SMEAR CALLED TO AND CORRECTLY REPEATED BY: LAST Eisenberg RN 3N ON 22 AT 0553 TO TMB.            Culture result:       STAPHYLOCOCCUS SPECIES, COAGULASE NEGATIVE GROWING IN 1 OF 2 BOTTLES DRAWN No Site Indicated          CULTURE, BLOOD [[id number]] Collected: 22 1215    Order Status: Completed Specimen: Blood Updated: 07/01/22 0722     Special Requests: NO SPECIAL REQUESTS        Culture result: NO GROWTH 6 DAYS       CULTURE, BLOOD [[id number]] Collected: 06/24/22 0100    Order Status: Completed Specimen: Blood Updated: 06/30/22 0738     Special Requests: NO SPECIAL REQUESTS        Culture result: NO GROWTH 6 DAYS       COVID-19 RAPID TEST [[id number]]  (Abnormal) Collected: 06/29/22 1842    Order Status: Completed Specimen: Nasopharyngeal Updated: 06/29/22 1952     Specimen source Nasopharyngeal        COVID-19 rapid test Detected        Comment:      The specimen is POSITIVE for SARS-CoV-2, the novel coronavirus associated with COVID-19. This test has been authorized by the FDA under an Emergency Use Authorization (EUA) for use by authorized laboratories.         Fact sheet for Healthcare Providers: ConventionUpdate.co.nz  Fact sheet for Patients: ConventionUpdate.co.nz       Methodology: Isothermal Nucleic Acid Amplification  CALLED TO AND CORRECTLY REPEATED BY:  FRANCO MURPHY RN 3N TO [facility] AT 2048 ON [id number]         CULTURE, Tika Silvan STAIN [[id number]]  (Abnormal)  (Susceptibility) Collected: 06/24/22 1930    Order Status: Completed Specimen: Wound from Arm Updated: 06/28/22 0913     Special Requests: NO SPECIAL REQUESTS        GRAM STAIN RARE WBCS SEEN         NO ORGANISMS SEEN        Culture result:       SCANT STAPHYLOCOCCUS AUREUS          CULTURE, WOUND Sydell Hilary STAIN [[id number]]  (Abnormal)  (Susceptibility) Collected: 06/22/22 1505    Order Status: Completed Specimen: Catheter Tip Updated: 06/25/22 0844     Special Requests: NO SPECIAL REQUESTS        Culture result:       COLONY COUNT =>100 COLONIES STAPHYLOCOCCUS AUREUS          CULTURE, ANAEROBIC [[id number]] Collected: 06/24/22 1930    Order Status: Canceled Specimen: Surgical Specimen     CULTURE, Tika Silvan STAIN [[id number]]  (Abnormal)  (Susceptibility) Collected: 06/22/22 0915    Order Status: Completed Specimen: Wound from Abscess Updated: 06/24/22 0856     Special Requests: NO SPECIAL REQUESTS        GRAM STAIN OCCASIONAL WBCS SEEN               RARE GRAM POSITIVE COCCI IN PAIRS           Culture result:       HEAVY STAPHYLOCOCCUS AUREUS          C. DIFFICILE AG & TOXIN A/B [[id number]]  (Abnormal) Collected: 06/22/22 1945    Order Status: Completed Specimen: Stool from Miscellaneous sample Updated: 06/23/22 1410     PCR Reflex       See Reflex order for C. difficile (DNA)           INTERPRETATION       Indeterminate for Toxigenic C. difficile, DNA confirmation to follow. Lazarus Alosa DIFF MOLECULAR [[id number]] Collected: 06/22/22 1945    Order Status: Completed Specimen: Miscellaneous sample Updated: 06/23/22 1410     C Diff Molecular Negative        Comment: This specimen is negative for toxigenic C difficile by DNA amplification. Repeat testing is not recommended for confirmation, samples received within 7 days of this negative result will be rejected.        CULTURE, BLOOD [[id number]]  (Abnormal) Collected: 06/21/22 0244    Order Status: Completed Specimen: Blood Updated: 06/23/22 1336     Special Requests: NO SPECIAL REQUESTS        GRAM STAIN       GRAM POSITIVE COCCI IN CLUSTERS ANAEROBIC BOTTLE                  SMEAR CALLED TO AND CORRECTLY REPEATED BY: Arnoldo Sesay RN 3N, TO JAF AT 1847 6/21/2022           Culture result:       STAPHYLOCOCCUS AUREUS GROWING IN BOTH BOTTLES DRAWN No Site Indicated REFER TO [id number] FOR SENSITIVITIES          CULTURE, BLOOD [[id number]]  (Abnormal) Collected: 06/21/22 0253    Order Status: Completed Specimen: Blood Updated: 06/23/22 1335     Special Requests: NO SPECIAL REQUESTS        GRAM STAIN       GRAM POSITIVE COCCI IN CLUSTERS ANAEROBIC BOTTLE                  SMEAR CALLED TO AND CORRECTLY REPEATED BY: VICTORIANO PEDERSEN RN 3N, TO JAF AT 1847 6/21/2022                  GRAM POSITIVE COCCI IN CLUSTERS AEROBIC BOTTLE                  SMEAR CALLED TO AND CORRECTLY REPEATED BY: GUERO RODRIGUEZ RN 3N AT 0459 ON 6/22/22 TO TSH. Culture result:       STAPHYLOCOCCUS AUREUS GROWING IN BOTH BOTTLES DRAWN No Site Indicated REFER TO [id number] FOR SENSITIVITIES          CULTURE, BLOOD [[id number]]  (Abnormal) Collected: 06/20/22 0915    Order Status: Completed Specimen: Blood Updated: 06/23/22 0944     Special Requests: NO SPECIAL REQUESTS        GRAM STAIN       ANAEROBIC BOTTLE GRAM POSITIVE COCCI IN CLUSTERS                  SMEAR CALLED TO AND CORRECTLY REPEATED BY: DAVIS Ko RN 3N ON 6/21/22 AT 0531 TO TMB. GRAM POSITIVE COCCI IN CLUSTERS AEROBIC BOTTLE                  SMEAR CALLED TO AND CORRECTLY REPEATED BY: ELISA TOLLIVER RN 3N TO [address] [employer] [facility] AT 1109 ON 6/21/22. Culture result:       STAPHYLOCOCCUS AUREUS GROWING IN BOTH BOTTLES DRAWN SITE= RT ARM REFER TO [id number] FOR SENSITIVITIES          CULTURE, BLOOD [[id number]]  (Abnormal)  (Susceptibility) Collected: 06/20/22 0940    Order Status: Completed Specimen: Blood Updated: 06/23/22 0943     Special Requests: NO SPECIAL REQUESTS        GRAM STAIN       ANAEROBIC BOTTLE GRAM POSITIVE COCCI IN CLUSTERS                  SMEAR CALLED TO AND CORRECTLY REPEATED BY: DAVIS Ko RN 3N ON 6/21/22 AT 0531 TO TMB.                   GRAM POSITIVE COCCI IN CLUSTERS AEROBIC BOTTLE                  SMEAR CALLED TO AND CORRECTLY REPEATED BY: [facility], RN 3N, TO [facility] AT 1441 6/21/2022           Culture result:       STAPHYLOCOCCUS AUREUS GROWING IN BOTH BOTTLES DRAWN SITE = RARM          CULTURE, CATHETER TIP [[id number]] Collected: 06/22/22 1459    Order Status: Canceled Specimen: CVP Catheter Tip     BLOOD CULTURE ID PANEL [[id number]]  (Abnormal) Collected: 06/20/22 0940    Order Status: Completed Specimen: Blood Updated: 06/22/22 0031     Acc. no. from Micro Order [id number]     Enterococcus faecalis Not detected        Enterococcus faecium Not detected        Listeria monocytogenes Not detected        Staphylococcus Detected Staphylococcus aureus Detected        Staph epidermidis Not detected        Staph lugdunensis Not detected        Streptococcus Not detected        Streptococcus agalactiae (Group B) Not detected        Streptococcus pneumoniae Not detected        Streptococcus pyogenes (Group A) Not detected        Acinetobacter calcoaceticus-baumanii complex Not detected        Bacteroides fragilis Not detected        Enterobacterales species Not detected        Enterobacter cloacae complex Not detected        Escherichia coli Not detected        Klebsiella aerogenes Not detected        Klebsiella oxytoca Not detected        Klebsiella pneumoniae group Not detected        Proteus Not detected        Salmonella Not detected        Serratia marcescens Not detected        Haemophilus influenzae Not detected        Neisseria meningitidis Not detected        Pseudomonas aeruginosa Not detected        Steno maltophilia Not detected        Candida albicans Not detected        Candida auris Not detected        Candida glabrata Not detected        Candida krusei Not detected        Candida parapsilosis Not detected        Candida tropicalis Not detected        Crypto neoformans/gattii Not detected        RESISTANT GENES:            mecA/C and MREJ (Methicillin resistant gene) Not detected        Comment       False positive results may rarely occur.  Correlate with clinical,epidemiologic, and other laboratory findings           Comment: Please see BCID Interpretation Guide in EPIC Links       LEGIONELLA PNEUMOPHILA AG, URINE [[id number]] Collected: 06/21/22 0630    Order Status: Canceled Specimen: Urine     Kanika Bird, URINE [[id number]] Collected: 06/21/22 0630    Order Status: Canceled Specimen: Urine     CULTURE, RESPIRATORY/SPUTUM/BRONCH Hull Eli [[id number]] Collected: 06/21/22 0630    Order Status: Canceled Specimen: Sputum     COVID-19 RAPID TEST [[id number]]  (Abnormal) Collected: 06/20/22 0925    Order Status: Completed Specimen: Nasopharyngeal Updated: 06/20/22 1013     Specimen source Nasopharyngeal        COVID-19 rapid test Detected        Comment:      The specimen is POSITIVE for SARS-CoV-2, the novel coronavirus associated with COVID-19. This test has been authorized by the FDA under an Emergency Use Authorization (EUA) for use by authorized laboratories. Fact sheet for Healthcare Providers: ConventionUpdate.co.nz  Fact sheet for Patients: ConventionUpdate.co.nz       Methodology: Isothermal Nucleic Acid Amplification  CALLED TO AND CORRECTLY REPEATED BY:  DR TIAN ED 6/20/22 AT 1013 TO [facility]         INFLUENZA A & B AG (RAPID TEST) [[id number]] Collected: 06/20/22 0925    Order Status: Completed Specimen: Nasopharyngeal from Nasal washing Updated: 06/20/22 1012     Influenza A Antigen Negative        Comment: A negative result does not exclude influenza virus infection, seasonal or H1N1 due to suboptimal sensitivity. If influenza is circulating in your community, a diagnosis of influenza should be considered based on a patients clinical presentation and empiric antiviral treatment should be considered, if indicated. Influenza B Antigen Negative              Lab results:    Chemistry  No results for input(s): GLU, NA, K, CL, CO2, BUN, CREA, CA, AGAP, BUCR, TBIL, AP, TP, ALB, GLOB, AGRAT in the last 72 hours. No lab exists for component: GPT    CBC w/ Diff  No results for input(s): WBC, RBC, HGB, HCT, PLT, GRANS, LYMPH, EOS, HGBEXT, HCTEXT, PLTEXT, HGBEXT, HCTEXT, PLTEXT in the last 72 hours. Imaging: report reviewed and as posted by radiologist   Results from Hospital Encounter encounter on 06/01/22    CT HEAD WO CONT    Narrative  EXAM: CT head    INDICATION: Confusion.     COMPARISON: 8/29/2021    TECHNIQUE: Axial CT imaging of the head was performed without intravenous  contrast. Standard multiplanar coronal and sagittal reformatted images were  obtained and are included in interpretation. One or more dose reduction techniques were used on this CT: automated exposure  control, adjustment of the mAs and/or kVp according to patient size, and  iterative reconstruction techniques. The specific techniques used on this CT  exam have been documented in the patient's electronic medical record. Digital  Imaging and Communications in Medicine (DICOM) format image data are available  to nonaffiliated external healthcare facilities or entities on a secure, media  free, reciprocally searchable basis with patient authorization for at least a  12-month period after this study. _______________    FINDINGS:    BRAIN AND POSTERIOR FOSSA: Right central arden hypodensity, new from prior study. No evidence of acute large vessel transcortical infarct or acute parenchymal  hemorrhage. No midline shift or hydrocephalus. EXTRA-AXIAL SPACES AND MENINGES: There are no abnormal extra-axial fluid  collections. CALVARIUM: Intact. SINUSES: Clear. OTHER: None.    _______________    Impression  Right central arden hypodensity, new from prior study, age indeterminate infarct. Correlate clinically.

## 2022-07-04 LAB
BASOPHILS # BLD: 0.1 K/UL (ref 0–0.1)
BASOPHILS NFR BLD: 1 % (ref 0–2)
DIFFERENTIAL METHOD BLD: ABNORMAL
EOSINOPHIL # BLD: 0.4 K/UL (ref 0–0.4)
EOSINOPHIL NFR BLD: 4 % (ref 0–5)
ERYTHROCYTE [DISTWIDTH] IN BLOOD BY AUTOMATED COUNT: 21.5 % (ref 11.6–14.5)
GLUCOSE BLD STRIP.AUTO-MCNC: 116 MG/DL (ref 70–110)
GLUCOSE BLD STRIP.AUTO-MCNC: 83 MG/DL (ref 70–110)
GLUCOSE BLD STRIP.AUTO-MCNC: 88 MG/DL (ref 70–110)
GLUCOSE BLD STRIP.AUTO-MCNC: 91 MG/DL (ref 70–110)
HCT VFR BLD AUTO: 24.7 % (ref 36–48)
HGB BLD-MCNC: 7.5 G/DL (ref 13–16)
IMM GRANULOCYTES # BLD AUTO: 0 K/UL (ref 0–0.04)
IMM GRANULOCYTES NFR BLD AUTO: 0 % (ref 0–0.5)
LYMPHOCYTES # BLD: 1 K/UL (ref 0.9–3.6)
LYMPHOCYTES NFR BLD: 11 % (ref 21–52)
MCH RBC QN AUTO: 25.6 PG (ref 24–34)
MCHC RBC AUTO-ENTMCNC: 30.4 G/DL (ref 31–37)
MCV RBC AUTO: 84.3 FL (ref 78–100)
MONOCYTES # BLD: 0.9 K/UL (ref 0.05–1.2)
MONOCYTES NFR BLD: 11 % (ref 3–10)
NEUTS SEG # BLD: 6.1 K/UL (ref 1.8–8)
NEUTS SEG NFR BLD: 72 % (ref 40–73)
NRBC # BLD: 0 K/UL (ref 0–0.01)
NRBC BLD-RTO: 0 PER 100 WBC
PLATELET # BLD AUTO: 262 K/UL (ref 135–420)
PMV BLD AUTO: 10 FL (ref 9.2–11.8)
RBC # BLD AUTO: 2.93 M/UL (ref 4.35–5.65)
WBC # BLD AUTO: 8.5 K/UL (ref 4.6–13.2)

## 2022-07-04 PROCEDURE — 74011250637 HC RX REV CODE- 250/637: Performed by: INTERNAL MEDICINE

## 2022-07-04 PROCEDURE — 85025 COMPLETE CBC W/AUTO DIFF WBC: CPT

## 2022-07-04 PROCEDURE — 82962 GLUCOSE BLOOD TEST: CPT

## 2022-07-04 PROCEDURE — 74011250637 HC RX REV CODE- 250/637: Performed by: HOSPITALIST

## 2022-07-04 PROCEDURE — 90935 HEMODIALYSIS ONE EVALUATION: CPT

## 2022-07-04 PROCEDURE — 74011250637 HC RX REV CODE- 250/637: Performed by: STUDENT IN AN ORGANIZED HEALTH CARE EDUCATION/TRAINING PROGRAM

## 2022-07-04 PROCEDURE — 74011250636 HC RX REV CODE- 250/636: Performed by: STUDENT IN AN ORGANIZED HEALTH CARE EDUCATION/TRAINING PROGRAM

## 2022-07-04 PROCEDURE — 74011250636 HC RX REV CODE- 250/636: Performed by: INTERNAL MEDICINE

## 2022-07-04 PROCEDURE — 36415 COLL VENOUS BLD VENIPUNCTURE: CPT

## 2022-07-04 PROCEDURE — 74011250636 HC RX REV CODE- 250/636: Performed by: HOSPITALIST

## 2022-07-04 PROCEDURE — 74011000250 HC RX REV CODE- 250: Performed by: INTERNAL MEDICINE

## 2022-07-04 PROCEDURE — 65270000046 HC RM TELEMETRY

## 2022-07-04 RX ADMIN — FUROSEMIDE 80 MG: 40 TABLET ORAL at 09:30

## 2022-07-04 RX ADMIN — SEVELAMER CARBONATE 1200 MG: 2400 POWDER, FOR SUSPENSION ORAL at 13:33

## 2022-07-04 RX ADMIN — ASPIRIN 81 MG: 81 TABLET, COATED ORAL at 09:30

## 2022-07-04 RX ADMIN — Medication 500 MG: at 20:38

## 2022-07-04 RX ADMIN — HEPARIN SODIUM 5000 UNITS: 5000 INJECTION INTRAVENOUS; SUBCUTANEOUS at 11:52

## 2022-07-04 RX ADMIN — EPOETIN ALFA-EPBX 20000 UNITS: 20000 INJECTION, SOLUTION INTRAVENOUS; SUBCUTANEOUS at 23:09

## 2022-07-04 RX ADMIN — DOXAZOSIN 2 MG: 1 TABLET ORAL at 09:30

## 2022-07-04 RX ADMIN — SEVELAMER CARBONATE 1200 MG: 2400 POWDER, FOR SUSPENSION ORAL at 17:00

## 2022-07-04 RX ADMIN — Medication 500 MG: at 09:30

## 2022-07-04 RX ADMIN — FUROSEMIDE 80 MG: 40 TABLET ORAL at 20:38

## 2022-07-04 RX ADMIN — CARVEDILOL 12.5 MG: 12.5 TABLET, FILM COATED ORAL at 20:39

## 2022-07-04 RX ADMIN — CALCITRIOL CAPSULES 0.25 MCG 0.25 MCG: 0.25 CAPSULE ORAL at 20:39

## 2022-07-04 RX ADMIN — HEPARIN SODIUM 5000 UNITS: 5000 INJECTION INTRAVENOUS; SUBCUTANEOUS at 02:58

## 2022-07-04 RX ADMIN — ISOSORBIDE MONONITRATE 60 MG: 60 TABLET, EXTENDED RELEASE ORAL at 09:30

## 2022-07-04 RX ADMIN — CARVEDILOL 12.5 MG: 12.5 TABLET, FILM COATED ORAL at 09:30

## 2022-07-04 RX ADMIN — SEVELAMER CARBONATE 1200 MG: 2400 POWDER, FOR SUSPENSION ORAL at 09:30

## 2022-07-04 RX ADMIN — CYANOCOBALAMIN TAB 1000 MCG 1000 MCG: 1000 TAB at 09:30

## 2022-07-04 RX ADMIN — HEPARIN SODIUM 5000 UNITS: 5000 INJECTION INTRAVENOUS; SUBCUTANEOUS at 20:38

## 2022-07-04 RX ADMIN — PANTOPRAZOLE SODIUM 40 MG: 40 TABLET, DELAYED RELEASE ORAL at 06:53

## 2022-07-04 RX ADMIN — CEFAZOLIN 2 G: 10 INJECTION, POWDER, FOR SOLUTION INTRAVENOUS at 20:38

## 2022-07-04 NOTE — PROGRESS NOTES
TREATMENT SUMMARY   Patient in room 342 dialyzed for 3 hours. Tolerated tx well with without complaint or complications. Right TDC functioning without complication accessing or BFR      2500 ml UF removed with a net UF removal of 2000 ml. Report given to  CHELSEY Heath RN with all questions answered. TREATMENT NOTES       Received report from CHELSEY Heath RN at 1430. TX was initiated while patient was in bed without difficulty. Aseptically, the CVC ports were accesed and flushed without problem. Treatment was started without reversing the lines. Will continue to monitor patients closely during treatment                                                                                               [address]    []1st Time Acute  []Stat[x] Routine []Urgent []Chronic Unit   []Acute Room []Bedside  []ICU/CCU []ER   Isolation Precautions: [x]Dialysis[] Airborne []Contact []Droplet []Reverse    Special Considerations:_______  [] Blood Consent Verified  []N/A   Allergies:[] NKA                 [x] vancomycin, penicillins _____________ Code Status [x]Full Code [] DNR  [] Other_____   Diet: [] Renal [] NPO [] Diabetic   [] Enteral Feeding [] Cardiac Diabetic: [x]Yes []No     [x]Signed Treatment Consent Verified   [x] Time Out/ Safety Check   PRIMARY NURSE REPORT: FIRST INITIAL/ LAST NAME/TITLE  PRE DIALYSIS: Bryanna Rhoades RN                                           TIME: 1430   ACCESS   CATHETER ACCESS: [] N/A  [] RIGHT  [] LEFT  [] IJ  [] SUBCL [] FEM                    [] First use X-ray  [x] Tunnel     [] Non-Tunneled      [x] No S/S infection  [] Redness [] Drainage  [] Cultured [] Swelling [] Pain                    [] Medical Aseptic [] Prep Dressing Changed                  [] Clotted [] Patent []      Flows: [] Good [] Poor [] Reversed                 If Access Problem Dr. Kelsi Kurtz: [] Yes [] No Date:_____  [] N/A[]   GRAFT/FISTULA ACCESS:  [] N/A  [] RIGHT  [] LEFT  [] UE   [] LE       [] AVG  [] AVF [] BUTTONHOLE    [] +BRUIT/THRILL [] MEDICAL ASEPTIC PREP     [] No S/S infection  [] Redness [] Drainage  [] Cultured [] Swelling [] Pain              If Access Problem Dr. Ella Lemus: [] Yes [] No    Date:______ [] N/A   GENERAL ASSESSMENT   LUNGS:  SaO2% ____ [x] Clear [] Coarse [] Crackles [] Wheezing               [] Diminished Location: [] RLL [] LLL [] RUL [] KYLAH    COUGH:  [] Productive  [] Loose[x] Dry [] N/A  RESPIRATIONS: [] Easy [] Labored    THERAPY: [x] RA   [] NC _____. L/min    Mask: [] NRB [] Venti  _____O2%                  [] Ventilator [] Intubated [] Trach [] BiPap [] CPap [] HI Flow   CARDIAC: [x] Regular [] Irregular [] Pericardial Rub [] JVD               Monitored Rhythm:______ [] N/A   EDEMA: [x] None [] Generalized [] Facial [] Pedal [] UE [] LE             [] Pitting [] 1 [] 2 [] 3 [] 4    [] Right [] Left [] Bilateral   SKIN:    [x] Warm [] Hot [] Cold  [] Dry [x] Pale [] Diaphoretic              [] Flushed [] Jaundiced [] Cyanotic [] Rash [] Weeping     LOC:    [x] Alert  Oriented to: [x] Person [x] Place [x] Time             [] Confused [] Lethargic [] Medicated [] Non-responsive    GI/ABDOMEN: [x] Flat [] Distended [] Soft [] Firm [] Diarrhea [x] Bowel Sounds Present [] Nausea [] Vomiting : passing loose stool   PAIN: [x] 0 [] 1 [] 2 [] 3 [] 4 [] 5 [] 6 [] 7 [] 8 [] 9 [] 10          Scale 1-10 Action/Follow Up_____   MOBILITY: [] Amb [x] Amb/Assist [] Bed  [x] Wheelchair    CURRENT LABS   HBsAg ONLY: Date Drawn:06.01.2022             [x] Negative [] Positive [] Unknown.      HBsAb: Date Drawn: 06.01.2022             [] Susceptible <10 [x] Immune ?10 [] Unknown   Date of Current Labs:  ATTACHED     EDUCATION   Person Educated: [x] Patient [] Other_________   Knowledge base: [] None [] Minimal [x] Substantial    Barriers to learning  [x] None _______________   Preferred method of learning: [] Written [] Oral [x] Visual [] Hands on    Topic: [] Access Care [x] S&S of infection [x] Fluid Management   [] K+  [x] Procedural  [] Albumin [] Medications [] Tx Options   [] Transplant [] Diet [] Other    Teaching Tools: [] Explain [] Demonstration [] Handout_____ [] Video______  CARE PLAN    [x] Renal Failure (Adult)  Interdisciplinary  · Fluid and electrolytes stabilized  ? Interventions  · Dehydration signs and symptoms (eg: Weight/lab monitoring; vomiting/diarrhea/urine; tenting; mucous membranes; dizziness/lethargy/irritability/confusion; weak pulse; tachycardia; blood pressure; I&O)  · Fluid overload signs and symptoms assessment (eg: Body weight increased; dyspnea; edema; hypertension; respiratory crackles/wheezing; JVD; lab monitoring; mental status changes; I&O)  · Monitor appropriate lab values  · COMPLIANCE WITH PRESCRIBED THERAPY  · ARTERIAL ACCESS SITE ASSESSMENT  · NUTRITION SCREENING  · Vital signs monitoring per assessed patient condition or unit standard  · Cardiac monitoring  · Hydration management  · Intake and output measurement  · Body weight monitoring  · Skin care  · DIALYSIS  · Nutrition Care Process per nutrition screen  · Oral hygiene care every 2 hours  · Pain management     · Outcome   ? [x] Progressing Towards Goal  ? [] Not Progressing Towards Goals  ? [] Goals Met/Resolved  ? [] Goals Not Met/ Resolved        · Patient/ Family Education  ?  Progressing Towards Goals          RO/HEMODIAYLSIS MACHINE SAFETY CHECKS- BEFORE EACH TREATMENT          [x] THE [facility]: Machine Serial #1:  [id number]     RO Serial #1: [id number]                       [] [facility]: Machine Serial #2:  [id number]   RO Serial #2: [id number]            Alarm Test: [x] Pass  Time_14:44  [x] RO/Machine Log Complete    [x] Extracorporeal circuit Tested for integrity           Dialyzer_[id number]_   Tubing_21I02-11    Dialysate: pH_7.4  Temp._36.0_Conductivity: Meter 14.0_ HD Machine_14.0__   CHLORINE TESTING- BEFORE EACH TREATMENT AND EVERY 4 HOURS   Total Chlorine: [x] Less than 0.1 ppm Time:_1445_2nd Check Time:_NR  (If greater than 0.1 ppm from Primary then every 30 minutes from Secondary)   TREATMENT INIATION-WITH DIALYSIS PRECAUTIONS   [x] All Connections Secured   [x] Saline Line Double Clamped    [x] Venous Parameters Set [x] Arterial Parameters Set    [x] Prime Given 250 ml     [x] Air Foam Detector Engaged   PRE-TREATMENT   UF Calculations: Wt to lose:2000____ml(+) Oral:__ml(+)IV Meds/Fluids/Blood prods___ml(+) Prime/Rinse_500__ml(=)Total UF Goal_2500_mL   Scale Type:[x] Bed scale [] Sling Scale [] Wheel Chair Scale []  Not Ordered [] [] Unable to obtain pt on stretcher/ bed scale malfunctioning      [x] Time Out/Safety Check  Time:_14:55   INTRADIALYTIC MONITORING  (SEE ATTACHED FLOWSHEET)     POST TREATMENT    Time Medication Dose Volume Route    Initials                                           [facility] Signatures Title Initials Time                     Dialyzer cleared: [x] Good [] Fair [] Poor     Blood Processed _68__Liters    Net UF Removed _2000___mL  Post Tx Access:                  AVF/AVG: Bleeding Stop       Art.___min Chaim.____min []+bruit/thrill                Catheter: Locking Solution [] Heparin 1 ml/1000 units  [x] Normal Saline                                                                               Art._1.8____ ml Chaim.__1.8___ml  Post Assessment:              Skin: [x]Warm []Dry []Diaphoretic []Flushed [] Pale []Cyanotic            Lungs: [x]Clear []Coarse []Crackles []Wheezing             Cardiac: [x]Regular []Irregular  []Monitored rhythm____ []N/A            Edema: [x]None []General []Facial []Pedal  []UE []LE []RIGHT []LEFT            Pain: [x]0 []1 []2 []3 []4 []5 []6 []7 []8 []9 []10   POST Tx Note: Patient in room 342 dialyzed for 3 hours. Tolerated tx well with without complaint or complications.   Right TDC functioning without complication accessing or BFR      2500 ml UF removed with a net UF removal of 2000 ml. Report given to  CHELSEY Heath RN with all questions answered. Primary Nurse Report: First initial/Last name/Title    Post Dialysis:_R.  [place], RN         Time:_1820    Abbreviations: AVG-arterial venous graft, AVF-arterial venous fistula, IJ-Internal Jugular,  Subcl-Subclavian, Fem-Femoral, Tx-treatment, AP/HR-apical heart rate, DFR-dialysate flow rate, BFR-blood flow rate, AP-arterial pressure, -venous pressure, UF-ultrafiltrate, TMP-transmembrane pressure, Chaim-Venous, Art-Arterial, RO-Reverse Osmosis

## 2022-07-04 NOTE — PROGRESS NOTES
Assessment:     pt is non compliant, cut dialysis short, does not show up for dialysis or comes to HD but then goes to bathroom and spend good time there, then leaves dialysis without  HD. Poor prognosis and high mortality. Recent blood cx is negative from 6/24 and 6/25  Appreciate vascular surgery assistanc  Appreciate ID assistance, antibiotics per ID    Intake and output   Dose all  meds for ESRD   Renal diet, salt and potassium restriction      Cefazolin 2/2/3 gm post HD MWF  Can be arranged with HD.   BMD- has secondary Hyperparathyroidism  Continue  calcitriol 1 mcg MWF  sevelamer for phos binders         Uncontrolled Hypertension: BP is at target range now  Resume coreg 12.5 mg BID  On doxazosine, Imdur and lasix        Anemia of CKD- Retacrit during HD         CC:ESRF              Subjective:   PT does not have any somatic complaints                   Blood pressure (!) 159/68, pulse 87, temperature 98.5 °F (36.9 °C), resp. rate 18, height 5' 7\" (1.702 m), weight 73.5 kg (162 lb), SpO2 97 %.       awake and alert    No intake or output data in the 24 hours ending 07/04/22 1241   Recent Labs     07/04/22  0550   WBC 8.5     Lab Results   Component Value Date/Time    Sodium 134 (L) 06/26/2022 07:35 AM    Potassium 4.3 06/26/2022 07:35 AM    Chloride 101 06/26/2022 07:35 AM    CO2 24 06/26/2022 07:35 AM    Anion gap 9 06/26/2022 07:35 AM    Glucose 81 06/26/2022 07:35 AM    BUN 65 (H) 06/26/2022 07:35 AM    Creatinine 12.00 (H) 06/26/2022 07:35 AM    BUN/Creatinine ratio 5 (L) 06/26/2022 07:35 AM    GFR est AA 5 (L) 06/26/2022 07:35 AM    GFR est non-AA 4 (L) 06/26/2022 07:35 AM    Calcium 7.7 (L) 06/26/2022 07:35 AM        Current Facility-Administered Medications   Medication Dose Route Frequency Provider Last Rate Last Admin    ceFAZolin (ANCEF) 2 g/20 mL in sterile water IV syringe  2 g IntraVENous Once per day on Mon Wed Emily Blackwell MD   2 g at 06/29/22 4003    ceFAZolin (ANCEF) 3 g/30 mL in sterile water IV syringe  3 g IntraVENous every Friday Emily Blackwell MD   3 g at 07/01/22 2251    Saccharomyces boulardii (FLORASTOR) capsule 500 mg  500 mg Oral BID Emily Blackwell MD   500 mg at 07/04/22 0930    diphenhydrAMINE (BENADRYL) injection 25-50 mg  25-50 mg IntraVENous Rad Multiple Kaley Hollins MD   50 mg at 06/26/22 1356    naloxone (NARCAN) injection 0.4 mg  0.4 mg IntraVENous PRN Kaley Hollins MD        HYDROmorphone (DILAUDID) injection 0.5 mg  0.5 mg IntraVENous Q3H PRN Jeremias Poet, PA-C   0.5 mg at 06/26/22 0856    oxyCODONE-acetaminophen (PERCOCET) 5-325 mg per tablet 1-2 Tablet  1-2 Tablet Oral Q6H PRN Jeremias Poet, PA-C   2 Tablet at 06/30/22 1054    epoetin delonte-epbx (RETACRIT) injection 20,000 Units  20,000 Units SubCUTAneous Q MON, WED & FRI Daniel Ricci MD   20,000 Units at 07/01/22 2250    sevelamer carbonate (RENVELA) oral powder 1,200 mg  1,200 mg Oral TID WITH MEALS José Alston MD   1,200 mg at 07/04/22 0930    ondansetron (ZOFRAN) injection 4 mg  4 mg IntraVENous Q8H PRN Jyoti MEJIAS MD   4 mg at 07/02/22 1757    acetaminophen (TYLENOL) tablet 650 mg  650 mg Oral Q6H PRN Faith Watson MD   650 mg at 06/22/22 1417    Or    acetaminophen (TYLENOL) suppository 650 mg  650 mg Rectal Q6H PRN Faith Watson MD        guaiFENesin-dextromethorphan (ROBITUSSIN DM) 100-10 mg/5 mL syrup 5 mL  5 mL Oral Q4H PRN Faith Watson MD        sodium chloride (NS) flush 5-10 mL  5-10 mL IntraVENous PRN Alexandra Licea MD   10 mL at 06/22/22 0650    carvediloL (COREG) tablet 12.5 mg  12.5 mg Oral BID WITH MEALS José Alston MD   12.5 mg at 07/04/22 0930    calcitRIOL (ROCALTROL) capsule 0.25 mcg  0.25 mcg Oral Q MON, WED & José Beck MD   0.25 mcg at 07/01/22 2252    aspirin delayed-release tablet 81 mg  81 mg Oral DAILY Windy Ascencio MD   81 mg at 07/04/22 0930    cyanocobalamin tablet 1,000 mcg  1,000 mcg Oral DAILY Dianne Rain, Melissa Marte MD   1,000 mcg at 07/04/22 0930    doxazosin (CARDURA) tablet 2 mg  2 mg Oral DAILY Mario MEJIAS MD   2 mg at 07/04/22 0930    furosemide (LASIX) tablet 80 mg  80 mg Oral BID Adriane Marino MD   80 mg at 07/04/22 0930    isosorbide mononitrate ER (IMDUR) tablet 60 mg  60 mg Oral DAILY Mario MEJIAS MD   60 mg at 07/04/22 0930    pantoprazole (PROTONIX) tablet 40 mg  40 mg Oral ACB Mario MEJIAS MD   40 mg at 07/04/22 7725    glucose chewable tablet 16 g  16 g Oral PRN Adriane Marino MD        glucagon (GLUCAGEN) injection 1 mg  1 mg IntraMUSCular PRN Adriane Marino MD        insulin lispro (HUMALOG) injection   SubCUTAneous AC&HS Mario MEJIAS MD   4 Units at 06/23/22 1142    dextrose 10% infusion 0-250 mL  0-250 mL IntraVENous PRN Adriane Marino MD        hydrALAZINE (APRESOLINE) 20 mg/mL injection 20 mg  20 mg IntraVENous Q6H PRN Adriane Marino MD   20 mg at 07/02/22 0155    heparin (porcine) injection 5,000 Units  5,000 Units SubCUTAneous Q8H Mario MEJIAS MD   5,000 Units at 07/04/22 1152    heparin (porcine) 1,000 unit/mL injection 3,600 Units  3,600 Units Hemodialysis DIALYSIS PRN Charleen Nieves MD   3,600 Units at 07/01/22 1620

## 2022-07-04 NOTE — PROGRESS NOTES
Bedside report received on pt, no complaint voiced, call light within pt's reach. Left upper arm dressing patent, clean, dry and intact.     Scheduled meds administered, pt tolerated well

## 2022-07-04 NOTE — PROGRESS NOTES
Problem: Chronic Renal Failure  Goal: *Fluid and electrolytes stabilized  Outcome: Progressing Towards Goal     Problem: Chronic Renal Failure  Goal: *Fluid and electrolytes stabilized  Outcome: Progressing Towards Goal. Patient still cutting treatments

## 2022-07-04 NOTE — PROGRESS NOTES
Pt unavailable for PT due to:  []  Nausea/vomiting  []  Eating  []  Pain  []  Pt lethargic  [x]  Pt currently on bedpan. Spoke with pt and he is requesting extended time before wanting to get off bedpan. Will f/u later as schedule allows. Thank you.   Malu Britton

## 2022-07-04 NOTE — PROGRESS NOTES
CM was informed by physician on Sunday that the patient's positive blood culture was a contaminant. [facility] as closed and CM could not confirm they had cefazolin in stock and were prepared for patient's post-HD ABX infusion and to let them know the patient would be discharging as early as Monday. Per conversation with physician on Friday, CMS had notified [facility] that the patient would remain in the hospital until at least Friday due to the positive blood culture. CM called [facility] this morning, they have the cephazolin in stock for the patient. CM set up transport to dialysis today in case patient discharges today before dialysis. However, this morning the HD clinic called and spoke with the unit secretary, as told the patient has no discharge order, and transport was cancelled. At this time transport is unable to pick patient up for his scheduled Monday dialysis. CMS called to speak to the primary RN to confirm that the patient had been taught to change his dressing. CM spoke with the patient and he stated he was not taught how to preform his dressing change yesterday, and that the dressing change did not occur until 0300 when he was aroused from sleep and too drowsy to be teachable. CM reached out to left a  for patient's brother to see if he can come to the hospital to learn how to perform dressing change. CM reached out to [facility], they have agreed to increase their visits to 3-4 times per week for two weeks to assist with dressing changes. Primary RN notified CM that she spoke with dialysis, they are planning to come to patient room this afternoon. CM arranged for transport home through [facility] with patient's Medicaid. CM requested 4:30 PM transport, CM will be called by 2:15 PM to confirm time. Trip number : [id number].

## 2022-07-04 NOTE — PROGRESS NOTES
Hospitalist Progress Note    Patient: Jr Winslow MRN: [id number]  CSN: [id number]    YOB: 1970  Age: 50 y.o. Sex: male    DOA: 6/20/2022 LOS:  LOS: 14 days                Assessment/Plan     Patient Active Problem List   Diagnosis Code    Iron deficiency anemia D50.9    Other restrictive cardiomyopathy ([facility] I42.5    ESRD (end stage renal disease) on dialysis ([facility] N18.6, Z99.2    HTN (hypertension) I10    Hx of BKA, right ([facility] Z89.511    Uremia N19    Non-compliance with renal dialysis ([facility] Z91.15    Hypertension I10    COVID-19 U07.1    Pulmonary edema J81.1    Volume overload E87.70    Sepsis ([facility] A41.9    Bacteremia R78.81    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 7XXA        Chief complaint :  Fever, cough, congestion  54 y.o. male with end-stage renal disease, diabetes, CHF, hypertension, s/p right BKA presents to ER with concerns of not feeling well, fever congestion cough and shortness of breath. NEED TO REMOVE FEMORAL DIALYSIS CATHETER TOMORROW BEFORE DISCHARGE. Sepsis -  POA  Secondary to bacteremia  Source TDC vs AVG    Persistent Bacteremia -  Blood cultures growing MSSA  Blood cultures from 06/29 with coag negative staph, likely contaminant. repeat cultures negative. On cefazolin. ID following. Diarrhea now better, continue with probiotics. Monitor for CDI. LUE non functioning AVG   source of infection  Vascular consulted  S/p excision of infected AV graft left upper arm. S/p TDC placement. Volume overload/pulmonary edema-  Resolved with Fluid removal with HD     ESRD-  Nephrology following  HD per nephrology     Hypertension-  Monitor BP  Continue with home medications.     COVID 19-  Not hypoxic, monitor  Repeat rapid COVID 19 positive.     DM-  Started on sliding scale insulin, ADA diet.   Blood sugars low, stop lantus.     Right BKA     DVT prophylaxis with heparin       Disposition : discharge planning    Review of systems  General: No fevers or chills. Cardiovascular: No chest pain or pressure. No palpitations. Pulmonary: No shortness of breath. Gastrointestinal: No nausea, vomiting. Physical Exam:  General: Awake, cooperative, no acute distress    HEENT: NC, Atraumatic. PERRLA, anicteric sclerae. Lungs: CTA Bilaterally. No Wheezing/Rhonchi/Rales. Heart:  S1 S2,  No murmur, No Rubs, No Gallops  Abdomen: Soft, Non distended, Non tender.  +Bowel sounds,   Extremities: Right BKA, tender LUE AVG site  Psych:   Not anxious or agitated. Neurologic:  No acute neurological deficit. Vital signs/Intake and Output:  Visit Vitals  BP (!) 128/53 (BP Patient Position: At rest)   Pulse 87   Temp 97.8 °F (36.6 °C)   Resp 18   Ht 5' 7\" (1.702 m)   Wt 73.5 kg (162 lb)   SpO2 97%   BMI 25.37 kg/m²     Current Shift:  No intake/output data recorded. Last three shifts:  07/02 1901 - 07/04 0700  In: 360 [P.O.:360]  Out: -             Labs: Results:       Chemistry No results for input(s): GLU, NA, K, CL, CO2, BUN, CREA, CA, AGAP, BUCR, TBIL, AP, TP, ALB, GLOB, AGRAT in the last 72 hours. No lab exists for component: GPT   CBC w/Diff Recent Labs     07/04/22  0550   WBC 8.5   RBC 2.93*   HGB 7.5*   HCT 24.7*      GRANS 72   LYMPH 11*   EOS 4      Cardiac Enzymes No results for input(s): CPK, CKND1, WILLIE in the last 72 hours. No lab exists for component: CKRMB, TROIP   Coagulation No results for input(s): PTP, INR, APTT, INREXT, INREXT in the last 72 hours. Lipid Panel Lab Results   Component Value Date/Time    Cholesterol, total 115 06/02/2022 09:45 AM    HDL Cholesterol 62 (H) 06/02/2022 09:45 AM    LDL, calculated 46 06/02/2022 09:45 AM    VLDL, calculated 7 06/02/2022 09:45 AM    Triglyceride 35 06/02/2022 09:45 AM    CHOL/HDL Ratio 1.9 06/02/2022 09:45 AM      BNP No results for input(s): BNPP in the last 72 hours. Liver Enzymes No results for input(s): TP, ALB, TBIL, AP in the last 72 hours.     No lab exists for component: SGOT, GPT, DBIL   Thyroid Studies Lab Results   Component Value Date/Time    TSH 2.38 08/29/2021 01:20 AM        Procedures/imaging: see electronic medical records for all procedures/Xrays and details which were not copied into this note but were reviewed prior to creation of Plan

## 2022-07-04 NOTE — PROGRESS NOTES
Alert and oriented x4, assessments and VS completed, pt has no reports of pain, wound dressing completed, pt able to teach back wound dressing procedure, pt required minimal assistance from nurse. And verbalized that he was comfortable to do it at home by himself.

## 2022-07-05 ENCOUNTER — HOME HEALTH ADMISSION (OUTPATIENT)
Dept: HOME HEALTH SERVICES | Facility: HOME HEALTH | Age: 52
End: 2022-07-05

## 2022-07-05 VITALS
BODY MASS INDEX: 24.75 KG/M2 | WEIGHT: 157.7 LBS | RESPIRATION RATE: 16 BRPM | OXYGEN SATURATION: 100 % | DIASTOLIC BLOOD PRESSURE: 47 MMHG | HEART RATE: 80 BPM | TEMPERATURE: 98.5 F | SYSTOLIC BLOOD PRESSURE: 118 MMHG | HEIGHT: 67 IN

## 2022-07-05 LAB
CK SERPL-CCNC: 17 U/L (ref 39–308)
GLUCOSE BLD STRIP.AUTO-MCNC: 105 MG/DL (ref 70–110)
GLUCOSE BLD STRIP.AUTO-MCNC: 75 MG/DL (ref 70–110)
GLUCOSE BLD STRIP.AUTO-MCNC: 98 MG/DL (ref 70–110)

## 2022-07-05 PROCEDURE — 97535 SELF CARE MNGMENT TRAINING: CPT

## 2022-07-05 PROCEDURE — 74011250637 HC RX REV CODE- 250/637: Performed by: INTERNAL MEDICINE

## 2022-07-05 PROCEDURE — 74011000250 HC RX REV CODE- 250: Performed by: EMERGENCY MEDICINE

## 2022-07-05 PROCEDURE — 97530 THERAPEUTIC ACTIVITIES: CPT

## 2022-07-05 PROCEDURE — 82550 ASSAY OF CK (CPK): CPT

## 2022-07-05 PROCEDURE — 74011250636 HC RX REV CODE- 250/636: Performed by: HOSPITALIST

## 2022-07-05 PROCEDURE — 74011250637 HC RX REV CODE- 250/637: Performed by: STUDENT IN AN ORGANIZED HEALTH CARE EDUCATION/TRAINING PROGRAM

## 2022-07-05 PROCEDURE — 74011250637 HC RX REV CODE- 250/637: Performed by: HOSPITALIST

## 2022-07-05 PROCEDURE — 82962 GLUCOSE BLOOD TEST: CPT

## 2022-07-05 RX ORDER — CALCITRIOL 0.25 UG/1
0.25 CAPSULE ORAL
Qty: 1 CAPSULE | Refills: 0 | Status: SHIPPED
Start: 2022-07-06

## 2022-07-05 RX ADMIN — FUROSEMIDE 80 MG: 40 TABLET ORAL at 08:38

## 2022-07-05 RX ADMIN — CARVEDILOL 12.5 MG: 12.5 TABLET, FILM COATED ORAL at 08:38

## 2022-07-05 RX ADMIN — PANTOPRAZOLE SODIUM 40 MG: 40 TABLET, DELAYED RELEASE ORAL at 06:31

## 2022-07-05 RX ADMIN — ISOSORBIDE MONONITRATE 60 MG: 60 TABLET, EXTENDED RELEASE ORAL at 08:38

## 2022-07-05 RX ADMIN — HEPARIN SODIUM 5000 UNITS: 5000 INJECTION INTRAVENOUS; SUBCUTANEOUS at 03:44

## 2022-07-05 RX ADMIN — ASPIRIN 81 MG: 81 TABLET, COATED ORAL at 08:37

## 2022-07-05 RX ADMIN — SEVELAMER CARBONATE 1200 MG: 2400 POWDER, FOR SUSPENSION ORAL at 18:35

## 2022-07-05 RX ADMIN — HEPARIN SODIUM 5000 UNITS: 5000 INJECTION INTRAVENOUS; SUBCUTANEOUS at 12:00

## 2022-07-05 RX ADMIN — SEVELAMER CARBONATE 1200 MG: 2400 POWDER, FOR SUSPENSION ORAL at 12:00

## 2022-07-05 RX ADMIN — DOXAZOSIN 2 MG: 1 TABLET ORAL at 08:37

## 2022-07-05 RX ADMIN — CARVEDILOL 12.5 MG: 12.5 TABLET, FILM COATED ORAL at 18:34

## 2022-07-05 RX ADMIN — Medication 500 MG: at 08:38

## 2022-07-05 RX ADMIN — SEVELAMER CARBONATE 1200 MG: 2400 POWDER, FOR SUSPENSION ORAL at 08:38

## 2022-07-05 RX ADMIN — HEPARIN SODIUM 5000 UNITS: 5000 INJECTION INTRAVENOUS; SUBCUTANEOUS at 18:33

## 2022-07-05 RX ADMIN — CYANOCOBALAMIN TAB 1000 MCG 1000 MCG: 1000 TAB at 08:37

## 2022-07-05 RX ADMIN — SODIUM CHLORIDE, PRESERVATIVE FREE 10 ML: 5 INJECTION INTRAVENOUS at 06:31

## 2022-07-05 NOTE — PROGRESS NOTES
Hospitalist Progress Note    Patient: Fox Fisher MRN: [id number]  CSN: [id number]    YOB: 1970  Age: 50 y.o. Sex: male    DOA: 6/20/2022 LOS:  LOS: 15 days         Assessment/Plan     Patient Active Problem List   Diagnosis Code    Iron deficiency anemia D50.9    Other restrictive cardiomyopathy ([facility] I42.5    ESRD (end stage renal disease) on dialysis ([facility] N18.6, Z99.2    HTN (hypertension) I10    Hx of BKA, right ([facility] Z89.511    Uremia N19    Non-compliance with renal dialysis ([facility] Z91.15    Hypertension I10    COVID-19 U07.1    Pulmonary edema J81.1    Volume overload E87.70    Sepsis ([facility] A41.9    Bacteremia R78.81    Arteriovenous graft infection ([facility] T82. 7XXA        Chief complaint :  Fever, cough, congestion  54 y.o. male with end-stage renal disease, diabetes, CHF, hypertension, s/p right BKA presents to ER with concerns of not feeling well, fever congestion cough and shortness of breath. DC today with f/u at [facility] for HD  Miscommunication with DC yesterday results in pt not being able to DC yesterday  [address]  [address] has agreed to increase their visits to 3-4 times per week for two weeks to assist with dressing changes    Sepsis -  POA  Secondary to bacteremia  Source TDC vs AVG    Persistent Bacteremia -  Blood cultures growing MSSA  Blood cultures from 06/29 with coag negative staph, likely contaminant. repeat cultures negative. On cefazolin. ID following. Diarrhea now better, continue with probiotics. Monitor for CDI. 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 S/p TDC placement.     Volume overload/pulmonary edema-  Resolved with Fluid removal with HD     ESRD-  Nephrology following  HD per nephrology     Hypertension-  Monitor BP  Continue with home medications.     COVID 19-  Not hypoxic, monitor  Repeat rapid COVID 19 positive.     DM-  Started on sliding scale insulin, ADA diet. Blood sugars low, stop lantus.     Right BKA     DVT prophylaxis with heparin    S: Sleeping, no complaints    Disposition : DC home, see above     Review of systems  General: No fevers or chills. Cardiovascular: No chest pain or pressure. No palpitations. Pulmonary: No shortness of breath. Gastrointestinal: No nausea, vomiting. Physical Exam:  General: Awake, cooperative, no acute distress    HEENT: NC, Atraumatic. PERRLA, anicteric sclerae. Lungs: CTA Bilaterally. No Wheezing/Rhonchi/Rales. Heart:  S1 S2,  No murmur, No Rubs, No Gallops  Abdomen: Soft, Non distended, Non tender.  +Bowel sounds,   Extremities: Right BKA, tender LUE AVG site  Psych:   Not anxious or agitated. Neurologic:  No acute neurological deficit. Vital signs/Intake and Output:  Visit Vitals  BP (!) 162/67   Pulse 86   Temp 98.7 °F (37.1 °C)   Resp 17   Ht 5' 7\" (1.702 m)   Wt 71.5 kg (157 lb 11.2 oz)   SpO2 100%   BMI 24.70 kg/m²     Current Shift:  No intake/output data recorded. Last three shifts:  07/03 1901 - 07/05 0700  In: 20 [I.V.:20]  Out: -             Labs: Results:       Chemistry No results for input(s): GLU, NA, K, CL, CO2, BUN, CREA, CA, AGAP, BUCR, TBIL, AP, TP, ALB, GLOB, AGRAT in the last 72 hours. No lab exists for component: GPT   CBC w/Diff Recent Labs     07/04/22  0550   WBC 8.5   RBC 2.93*   HGB 7.5*   HCT 24.7*      GRANS 72   LYMPH 11*   EOS 4      Cardiac Enzymes No results for input(s): CPK, CKND1, WILLIE in the last 72 hours. No lab exists for component: CKRMB, TROIP   Coagulation No results for input(s): PTP, INR, APTT, INREXT, INREXT in the last 72 hours.     Lipid Panel Lab Results   Component Value Date/Time    Cholesterol, total 115 06/02/2022 09:45 AM    HDL Cholesterol 62 (H) 06/02/2022 09:45 AM    LDL, calculated 46 06/02/2022 09:45 AM    VLDL, calculated 7 06/02/2022 09:45 AM    Triglyceride 35 06/02/2022 09:45 AM    CHOL/HDL Ratio 1.9 06/02/2022 09:45 AM      BNP No results for input(s): BNPP in the last 72 hours. Liver Enzymes No results for input(s): TP, ALB, TBIL, AP in the last 72 hours.     No lab exists for component: SGOT, GPT, DBIL   Thyroid Studies Lab Results   Component Value Date/Time    TSH 2.38 08/29/2021 01:20 AM        Procedures/imaging: see electronic medical records for all procedures/Xrays and details which were not copied into this note but were reviewed prior to creation of Plan

## 2022-07-05 NOTE — PROGRESS NOTES
D/C PLan: Discharge home with [address] at [facility] today. Nursing please provide supplies for dressing change. [facility] says they can come out within 48 hours. THey plan to see the patient 3-4 times per week and provde teaching to the patient and a partner on dressing changes. THe patient has been educated on how to change the dressing. CM called patient's Medicaid transport as they had not confirmed the requested 1630  pickup time as per agreement. CM informed by the Medicaid transprt coordinator that the patient would be picked up at 1910. Trip number is #[id number]. CM notifed RN who stated that transport had come early (around 3:40 PM)and said they would come back. The patient's HD social worer has arranged trsnport for him from home to the dialysis center tomorrow. Patient is aware. The patient is returning home with [facility], who have agreed to visit the patient 3-4 times per week to assist with dressing chagnes. The patient has been educated to perform the dressing change himself using the teachback method and the nurses have reported that he did well. The patient also states that he feels he does well with the dressing change and plans to have a friend/family member present when [facility] visits so they can also be educated on the dressing change. Per vascular the dressing should be changed daily, and this will be accomplished between [facility] and the patient/family ember. In addition, the patient has been provided with wound dressing supplies for the dressing changes he will need to self perform. HH will provide additional teaching and will monitor the wound for any s/s of infection.

## 2022-07-05 NOTE — PROGRESS NOTES
[facility]  (A Division of [address] [place] [address])                                                                                                                      Rick Kumar MD  Office #: - Option # [phone]  Fax #: [phone]     Date of Admission: 6/20/2022Date of Note: 7/5/2022  Reason for Referral: Evaluation and antibiotic management of bacteremia/COVID 19 Infection. 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 Proximal vein part well incorporated and overswen  Right temp HD cath placed  · Asymptomatic COVID 19 Infection-- doubt viral PNA  --fully vaccinared and boosted 11/2021  --not clear when viral infection was acquired  · Pul edema Vs PNA-- had few missed HD sessions  · Occluded L AVG  · History of C.diff in the past    Other Medical Issues- Mx per respective team:  ESRD on HD  DM  R BKA  Hx of AMS with severe Hypoglycemia== recent admission      Recommendation for ID issues I am following:  ------------------------------------------------------------------------------  Cont same as below    --CoNS bacteremia is contamination 6/29  Wound care for L arm-per vascular  Removal of R femoral TLC -soon. Timing per primary team    Cont probiotics bid    Cefazolin 2/2/3 gm post HD MWF- X6 weeks from 6/24/22.  End date: August 4  --Appreciate Renal MD help with arranging ABX post HD  --CBC with Diff Q O weekly    Fax labs to Dr Melissa Resendiz-- [phone]     Arrange FU with me in [facility] August Subjective:    Arrangement for DC ongoing  He was asleep, but wakes up and voices no complaint  Afebrile. ... HPI:  Kailey Vega is a 50 y.o. BLACK/ with PMH of ESRD on HD, DM. Recently admitted to [facility] with AMS and hypoglycemia. During his stay, he had underwent vascular study for occlusion on left arm, and was not able to use it, and R TDC was placed. He had missed couple of his HD sessions and he was feeling body aches and SOB and had cough with clear mucus. He came in to ED, felt he could have PNA. Other associated symptoms include N/V and loose BM without   Had high BP to 208/101, fever to 102.1 after admission. .  Currently on Vanco and Cefepime. Reports occasional pain on L arm. No abd pain. Admission CXR with cardiomegaly w/mild pul edema. WBC at 12K, no bands           Active Hospital Problems    Diagnosis Date Noted    Sepsis ([address] 06/22/2022    Bacteremia 06/22/2022    Arteriovenous graft infection ([address] 06/22/2022    COVID-19 06/20/2022    Pulmonary edema 06/20/2022    Volume overload 06/20/2022    ESRD (end stage renal disease) on dialysis ([address] 01/29/2020     Past Medical History:   Diagnosis Date    Chronic kidney disease     Diabetes ([address]     Heart failure ([address]     Hemod[facility]     Hypertension     Sickle cell trait ([facility]      Past Surgical History:   Procedure Laterality Date    HX ORTHOPAEDIC      right BKA 2017    IR DECLOT AV GRAFT PERCUTANEOUS  6/3/2022    IR INSERT NON TUNL CVC OVER 5 YRS  6/1/2022     History reviewed. No pertinent family history.   Social History     Socioeconomic History    Marital status: LEGALLY      Spouse name: Not on file    Number of children: Not on file    Years of education: Not on file    Highest education level: Not on file   Occupational History    Not on file   Tobacco Use    Smoking status: Former Smoker    Smokeless tobacco: Never Used   Substance and Sexual Activity    Alcohol use: No    Drug use: No    Sexual activity: Not on file   Other Topics Concern    Not on file   Social History Narrative    Not on file     Social Determinants of Health     Financial Resource Strain:     Difficulty of Paying Living Expenses: Not on file   Food Insecurity:     Worried About Running Out of Food in the Last Year: Not on file    Danii of Food in the Last Year: Not on file   Transportation Needs:     Lack of Transportation (Medical): Not on file    Lack of Transportation (Non-Medical):  Not on file   Physical Activity:     Days of Exercise per Week: Not on file    Minutes of Exercise per Session: Not on file   Stress:     Feeling of Stress : Not on file   Social Connections:     Frequency of Communication with Friends and Family: Not on file    Frequency of Social Gatherings with Friends and Family: Not on file    Attends Mormonism Services: Not on file    Active Member of [address] [place] [address] [employer] or Organizations: Not on file    Attends Club or Organization Meetings: Not on file    Marital Status: Not on file   Intimate Partner Violence:     Fear of Current or Ex-Partner: Not on file    Emotionally Abused: Not on file    Physically Abused: Not on file    Sexually Abused: Not on file   Housing Stability:     Unable to Pay for Housing in the Last Year: Not on file    Number of [place] in the Last Year: Not on file    Unstable Housing in the Last Year: Not on file       Allergies:  Penicillins and Vancomycin     Medications:  Current Facility-Administered Medications   Medication Dose Route Frequency    ceFAZolin (ANCEF) 2 g/20 mL in sterile water IV syringe  2 g IntraVENous Once per day on Mon Wed    ceFAZolin (ANCEF) 3 g/30 mL in sterile water IV syringe  3 g IntraVENous every Friday    Saccharomyces boulardii (FLORASTOR) capsule 500 mg  500 mg Oral BID    diphenhydrAMINE (BENADRYL) injection 25-50 mg  25-50 mg IntraVENous Rad Multiple    naloxone (NARCAN) injection 0.4 mg  0.4 mg IntraVENous PRN    HYDROmorphone (DILAUDID) injection 0.5 mg  0.5 mg IntraVENous Q3H PRN    oxyCODONE-acetaminophen (PERCOCET) 5-325 mg per tablet 1-2 Tablet  1-2 Tablet Oral Q6H PRN    epoetin delonte-epbx (RETACRIT) injection 20,000 Units  20,000 Units SubCUTAneous Q MON, WED & FRI    sevelamer carbonate (RENVELA) oral powder 1,200 mg  1,200 mg Oral TID WITH MEALS    ondansetron (ZOFRAN) injection 4 mg  4 mg IntraVENous Q8H PRN    acetaminophen (TYLENOL) tablet 650 mg  650 mg Oral Q6H PRN    Or    acetaminophen (TYLENOL) suppository 650 mg  650 mg Rectal Q6H PRN    guaiFENesin-dextromethorphan (ROBITUSSIN DM) 100-10 mg/5 mL syrup 5 mL  5 mL Oral Q4H PRN    sodium chloride (NS) flush 5-10 mL  5-10 mL IntraVENous PRN    carvediloL (COREG) tablet 12.5 mg  12.5 mg Oral BID WITH MEALS    calcitRIOL (ROCALTROL) capsule 0.25 mcg  0.25 mcg Oral Q MON, WED & FRI    aspirin delayed-release tablet 81 mg  81 mg Oral DAILY    cyanocobalamin tablet 1,000 mcg  1,000 mcg Oral DAILY    doxazosin (CARDURA) tablet 2 mg  2 mg Oral DAILY    furosemide (LASIX) tablet 80 mg  80 mg Oral BID    isosorbide mononitrate ER (IMDUR) tablet 60 mg  60 mg Oral DAILY    pantoprazole (PROTONIX) tablet 40 mg  40 mg Oral ACB    glucose chewable tablet 16 g  16 g Oral PRN    glucagon (GLUCAGEN) injection 1 mg  1 mg IntraMUSCular PRN    insulin lispro (HUMALOG) injection   SubCUTAneous AC&HS    dextrose 10% infusion 0-250 mL  0-250 mL IntraVENous PRN    hydrALAZINE (APRESOLINE) 20 mg/mL injection 20 mg  20 mg IntraVENous Q6H PRN    heparin (porcine) injection 5,000 Units  5,000 Units SubCUTAneous Q8H    heparin (porcine) 1,000 unit/mL injection 3,600 Units  3,600 Units Hemodialysis DIALYSIS PRN      Physical Exam:    Temp (24hrs), Av.3 °F (36.8 °C), Min:98 °F (36.7 °C), Max:98.7 °F (37.1 °C)    Visit Vitals  BP (!) 151/58   Pulse 87   Temp 98.5 °F (36.9 °C)   Resp 16   Ht 5' 7\" (1.702 m)   Wt 71.5 kg (157 lb 11.2 oz)   SpO2 97% BMI 24.70 kg/m²     New TDC placed 6/26  R groin-- TLC cath in place, dressing over the line still in place     GEN: WD Chronically ill looking - on 2L NC  not in resp distress. HEENT: Unicteric. EOMI intact  No neck swelling  CHEST: Non laboured breathing. CVS:RRR, no mur/gallop  ABD: Obese/soft. Non tender. Non distended  Skin: Dry and intact. No rash, no redness. CNS: CN grossly ok. Microbiology  All Micro Results     Procedure Component Value Units Date/Time    CULTURE, BLOOD [[id number]] Collected: 07/01/22 0714    Order Status: Completed Specimen: Blood Updated: 07/05/22 0725     Special Requests: NO SPECIAL REQUESTS        Culture result: NO GROWTH 4 DAYS       CULTURE, BLOOD [[id number]] Collected: 07/01/22 0707    Order Status: Completed Specimen: Blood Updated: 07/05/22 0725     Special Requests: NO SPECIAL REQUESTS        Culture result: NO GROWTH 4 DAYS       CULTURE, BLOOD [[id number]] Collected: 06/26/22 0726    Order Status: Completed Specimen: Blood Updated: 07/02/22 0712     Special Requests: NO SPECIAL REQUESTS        Culture result: NO GROWTH 6 DAYS       CULTURE, BLOOD [[id number]]  (Abnormal) Collected: 06/29/22 1705    Order Status: Completed Specimen: Blood Updated: 07/02/22 0635     Special Requests: NO SPECIAL REQUESTS        GRAM STAIN       AEROBIC BOTTLE GRAM POSITIVE COCCI IN CLUSTERS                  SMEAR CALLED TO AND CORRECTLY REPEATED BY: LAST Eisenberg RN 3N ON 7/1/22 AT 0553 TO TMB.            Culture result:       STAPHYLOCOCCUS SPECIES, COAGULASE NEGATIVE GROWING IN 1 OF 2 BOTTLES DRAWN No Site Indicated          CULTURE, BLOOD [[id number]] Collected: 06/25/22 1215    Order Status: Completed Specimen: Blood Updated: 07/01/22 0722     Special Requests: NO SPECIAL REQUESTS        Culture result: NO GROWTH 6 DAYS       CULTURE, BLOOD [[id number]] Collected: 06/24/22 0100    Order Status: Completed Specimen: Blood Updated: 06/30/22 0738     Special Requests: NO SPECIAL REQUESTS Culture result: NO GROWTH 6 DAYS       COVID-19 RAPID TEST [[id number]]  (Abnormal) Collected: 06/29/22 1842    Order Status: Completed Specimen: Nasopharyngeal Updated: 06/29/22 1952     Specimen source Nasopharyngeal        COVID-19 rapid test Detected        Comment:      The specimen is POSITIVE for SARS-CoV-2, the novel coronavirus associated with COVID-19. This test has been authorized by the FDA under an Emergency Use Authorization (EUA) for use by authorized laboratories.         Fact sheet for Healthcare Providers: Ace Metrixdate.co.nz  Fact sheet for Patients: [employer]       Methodology: Isothermal Nucleic Acid Amplification  CALLED TO AND CORRECTLY REPEATED BY:  FRANCO MURPHY RN 3N TO [facility] AT 2048 ON [id number]         CULTURE, Dorothe Jerry STAIN [[id number]]  (Abnormal)  (Susceptibility) Collected: 06/24/22 1930    Order Status: Completed Specimen: Wound from Arm Updated: 06/28/22 0913     Special Requests: NO SPECIAL REQUESTS        GRAM STAIN RARE WBCS SEEN         NO ORGANISMS SEEN        Culture result:       SCANT STAPHYLOCOCCUS AUREUS          CULTURE, WOUND Medrano Sander STAIN [[id number]]  (Abnormal)  (Susceptibility) Collected: 06/22/22 1505    Order Status: Completed Specimen: Catheter Tip Updated: 06/25/22 0844     Special Requests: NO SPECIAL REQUESTS        Culture result:       COLONY COUNT =>100 COLONIES STAPHYLOCOCCUS AUREUS          CULTURE, ANAEROBIC [[id number]] Collected: 06/24/22 1930    Order Status: Canceled Specimen: Surgical Specimen     CULTURE, Dorothe Jerry STAIN [[id number]]  (Abnormal)  (Susceptibility) Collected: 06/22/22 0915    Order Status: Completed Specimen: Wound from Abscess Updated: 06/24/22 0856     Special Requests: NO SPECIAL REQUESTS        GRAM STAIN OCCASIONAL WBCS SEEN               RARE GRAM POSITIVE COCCI IN PAIRS           Culture result:       HEAVY STAPHYLOCOCCUS AUREUS          C. DIFFICILE AG & TOXIN A/B [[id number]]  (Abnormal) Collected: 06/22/22 1945    Order Status: Completed Specimen: Stool from Miscellaneous sample Updated: 06/23/22 1410     PCR Reflex       See Reflex order for C. difficile (DNA)           INTERPRETATION       Indeterminate for Toxigenic C. difficile, DNA confirmation to follow. Jose Daniel Shall DIFF MOLECULAR [[id number]] Collected: 06/22/22 1945    Order Status: Completed Specimen: Miscellaneous sample Updated: 06/23/22 1410     C Diff Molecular Negative        Comment: This specimen is negative for toxigenic C difficile by DNA amplification. Repeat testing is not recommended for confirmation, samples received within 7 days of this negative result will be rejected. CULTURE, BLOOD [[id number]]  (Abnormal) Collected: 06/21/22 0244    Order Status: Completed Specimen: Blood Updated: 06/23/22 1336     Special Requests: NO SPECIAL REQUESTS        GRAM STAIN       GRAM POSITIVE COCCI IN CLUSTERS ANAEROBIC BOTTLE                  SMEAR CALLED TO AND CORRECTLY REPEATED BY: Myron Bravo RN 3N, TO [facility] AT 1847 6/21/2022           Culture result:       STAPHYLOCOCCUS AUREUS GROWING IN BOTH BOTTLES DRAWN No Site Indicated REFER TO [id number] FOR SENSITIVITIES          CULTURE, BLOOD [[id number]]  (Abnormal) Collected: 06/21/22 0253    Order Status: Completed Specimen: Blood Updated: 06/23/22 1335     Special Requests: NO SPECIAL REQUESTS        GRAM STAIN       GRAM POSITIVE COCCI IN CLUSTERS ANAEROBIC BOTTLE                  SMEAR CALLED TO AND CORRECTLY REPEATED BY: VICTORIANO PEDERSEN RN 3N, TO [facility] AT 1847 6/21/2022                  GRAM POSITIVE COCCI IN CLUSTERS AEROBIC BOTTLE                  SMEAR CALLED TO AND CORRECTLY REPEATED BY: GUERO RODRIGUEZ RN 3N AT 9902 ON 6/22/22 TO TSH.            Culture result:       STAPHYLOCOCCUS AUREUS GROWING IN BOTH BOTTLES DRAWN No Site Indicated REFER TO [id number] FOR SENSITIVITIES          CULTURE, BLOOD [[id number]]  (Abnormal) Collected: 06/20/22 0915    Order Status: Completed Specimen: Blood Updated: 06/23/22 0944     Special Requests: NO SPECIAL REQUESTS        GRAM STAIN       ANAEROBIC BOTTLE GRAM POSITIVE COCCI IN CLUSTERS                  SMEAR CALLED TO AND CORRECTLY REPEATED BY: DAVIS Ko RN 3N ON 6/21/22 AT 0531 TO TMB. GRAM POSITIVE COCCI IN CLUSTERS AEROBIC BOTTLE                  SMEAR CALLED TO AND CORRECTLY REPEATED BY: ELISA TOLLIVER RN 3N TO [address] [facility] [address] AT 6380 ON 6/21/22. Culture result:       STAPHYLOCOCCUS AUREUS GROWING IN BOTH BOTTLES DRAWN SITE= RT ARM REFER TO [id number] FOR SENSITIVITIES          CULTURE, BLOOD [[id number]]  (Abnormal)  (Susceptibility) Collected: 06/20/22 0940    Order Status: Completed Specimen: Blood Updated: 06/23/22 0943     Special Requests: NO SPECIAL REQUESTS        GRAM STAIN       ANAEROBIC BOTTLE GRAM POSITIVE COCCI IN CLUSTERS                  SMEAR CALLED TO AND CORRECTLY REPEATED BY: DAVIS Ko RN 3N ON 6/21/22 AT 0531 TO TMB.                   GRAM POSITIVE COCCI IN CLUSTERS AEROBIC BOTTLE                  SMEAR CALLED TO AND CORRECTLY REPEATED BY: JOLENE Sage And Tommy Morrow RN 3N, TO [facility] AT 1441 6/21/2022           Culture result:       STAPHYLOCOCCUS AUREUS GROWING IN BOTH BOTTLES DRAWN SITE = RARM          CULTURE, CATHETER TIP [[id number]] Collected: 06/22/22 1459    Order Status: Canceled Specimen: CVP Catheter Tip     BLOOD CULTURE ID PANEL [[id number]]  (Abnormal) Collected: 06/20/22 0940    Order Status: Completed Specimen: Blood Updated: 06/22/22 0031     Acc. no. from Micro Order [id number]     Enterococcus faecalis Not detected        Enterococcus faecium Not detected        Listeria monocytogenes Not detected        Staphylococcus Detected        Staphylococcus aureus Detected        Staph epidermidis Not detected        Staph lugdunensis Not detected        Streptococcus Not detected        Streptococcus agalactiae (Group B) Not detected        Streptococcus pneumoniae Not detected        Streptococcus pyogenes (Group A) Not detected        Acinetobacter calcoaceticus-baumanii complex Not detected        Bacteroides fragilis Not detected        Enterobacterales species Not detected        Enterobacter cloacae complex Not detected        Escherichia coli Not detected        Klebsiella aerogenes Not detected        Klebsiella oxytoca Not detected        Klebsiella pneumoniae group Not detected        Proteus Not detected        Salmonella Not detected        Serratia marcescens Not detected        Haemophilus influenzae Not detected        Neisseria meningitidis Not detected        Pseudomonas aeruginosa Not detected        Steno maltophilia Not detected        Candida albicans Not detected        Candida auris Not detected        Candida glabrata Not detected        Candida krusei Not detected        Candida parapsilosis Not detected        Candida tropicalis Not detected        Crypto neoformans/gattii Not detected        RESISTANT GENES:            mecA/C and MREJ (Methicillin resistant gene) Not detected        Comment       False positive results may rarely occur. Correlate with clinical,epidemiologic, and other laboratory findings           Comment: Please see BCID Interpretation Guide in EPIC Links       LEGIONELLA PNEUMOPHILA AG, URINE [[id number]] Collected: 06/21/22 0630    Order Status: Canceled Specimen: Urine     Marcelino Rulydia, URINE [[id number]] Collected: 06/21/22 0630    Order Status: Canceled Specimen: Urine     CULTURE, RESPIRATORY/SPUTUM/BRONCH Sera Better [[id number]] Collected: 06/21/22 0630    Order Status: Canceled Specimen: Sputum     COVID-19 RAPID TEST [[id number]]  (Abnormal) Collected: 06/20/22 0925    Order Status: Completed Specimen: Nasopharyngeal Updated: 06/20/22 1013     Specimen source Nasopharyngeal        COVID-19 rapid test Detected        Comment:      The specimen is POSITIVE for SARS-CoV-2, the novel coronavirus associated with COVID-19.        This test has been authorized by the FDA under an Emergency Use Authorization (EUA) for use by authorized laboratories. Fact sheet for Healthcare Providers: ConventionUpdate.co.nz  Fact sheet for Patients: ConventionUpdate.co.nz       Methodology: Isothermal Nucleic Acid Amplification  CALLED TO AND CORRECTLY REPEATED BY:  DR TIAN ED 6/20/22 AT 1013 TO [facility]         INFLUENZA A & B AG (RAPID TEST) [[id number]] Collected: 06/20/22 0925    Order Status: Completed Specimen: Nasopharyngeal from Nasal washing Updated: 06/20/22 1012     Influenza A Antigen Negative        Comment: A negative result does not exclude influenza virus infection, seasonal or H1N1 due to suboptimal sensitivity. If influenza is circulating in your community, a diagnosis of influenza should be considered based on a patients clinical presentation and empiric antiviral treatment should be considered, if indicated. Influenza B Antigen Negative              Lab results:    Chemistry  No results for input(s): GLU, NA, K, CL, CO2, BUN, CREA, CA, AGAP, BUCR, TBIL, AP, TP, ALB, GLOB, AGRAT in the last 72 hours. No lab exists for component: GPT    CBC w/ Diff  Recent Labs     07/04/22  0550   WBC 8.5   RBC 2.93*   HGB 7.5*   HCT 24.7*      GRANS 72   LYMPH 11*   EOS 4       Imaging: report reviewed and as posted by radiologist   Results from [place] encounter on 06/01/22    CT HEAD WO CONT    Narrative  EXAM: CT head    INDICATION: Confusion. COMPARISON: 8/29/2021    TECHNIQUE: Axial CT imaging of the head was performed without intravenous  contrast. Standard multiplanar coronal and sagittal reformatted images were  obtained and are included in interpretation. One or more dose reduction techniques were used on this CT: automated exposure  control, adjustment of the mAs and/or kVp according to patient size, and  iterative reconstruction techniques.   The specific techniques used on this CT  exam have been documented in the patient's electronic medical record. Digital  Imaging and Communications in Medicine (DICOM) format image data are available  to nonaffiliated external healthcare facilities or entities on a secure, media  free, reciprocally searchable basis with patient authorization for at least a  12-month period after this study. _______________    FINDINGS:    BRAIN AND POSTERIOR FOSSA: Right central arden hypodensity, new from prior study. No evidence of acute large vessel transcortical infarct or acute parenchymal  hemorrhage. No midline shift or hydrocephalus. EXTRA-AXIAL SPACES AND MENINGES: There are no abnormal extra-axial fluid  collections. CALVARIUM: Intact. SINUSES: Clear. OTHER: None.    _______________    Impression  Right central arden hypodensity, new from prior study, age indeterminate infarct. Correlate clinically.

## 2022-07-05 NOTE — PROGRESS NOTES
Ort: passed all meds. Blood sugars monitored , dressing changed , CHG treatment. Discharged and removed femoral line per order (per protocol) Discharge education provided. Patient verbalized understanding . Medications sent to preferred pharmacy. Patient able to teach back dressing change. And instructions. Jeoffrey Mortimer that he will get his brothers help at home. Went over compliance with medication.

## 2022-07-05 NOTE — PROGRESS NOTES
1409: Entered pt room in full PPE, pt on the phone and did not acknowledge my presence, will follow up as schedule permits.

## 2022-07-05 NOTE — ROUTINE PROCESS
Bedside shift change report given to Marv Heath RN (oncoming nurse) by Nathaniel Wakefield RN (offgoing nurse). Report included the following information SBAR, Procedure Summary, Intake/Output, MAR and Recent Results.

## 2022-07-06 ENCOUNTER — PATIENT OUTREACH (OUTPATIENT)
Dept: CASE MANAGEMENT | Age: 52
End: 2022-07-06

## 2022-07-06 NOTE — PROGRESS NOTES
Care Transitions Initial Call    Call within 2 business days of discharge: Yes     Patient: Augustin Cruz Patient : 1970 MRN: [id number]    Last Discharge [address]       Complaint Diagnosis Description Type Department Provider    22 Fatigue COVID-19 . .. ED to Hosp-Admission (Discharged) (ADMIT) Thien Zaldivar MD; Rudi Licea. .. Was this an external facility discharge? No Discharge Facility: n/a. CTN Attempt to contact patient via telephone on 22 for post hospital  follow up. Unable to reach. Unable to leave a voice message. Recording, \" Can't accept calls at this time. \"    No Perm/PHI noted on file at this time.      [facility] follow up appointment(s):   Future Appointments   Date Time Provider Belkys Sheth   8/10/2022 10:30 Gabriel Recinos MD [facility]

## 2022-07-06 NOTE — PROGRESS NOTES
Bedside and Verbal shift change report given to Amanda Cuba RN (oncoming nurse) by Irma Hoover RN (offgoing nurse).  Report included the following information SBAR, Kardex, ED Summary, Intake/Output, MAR, Recent Results, Med Rec Status and Cardiac Rhythm NSR.       1930  Pt supposed to be going home in 1-2 hours   Called into patient room -    2120  [facility] present at bedside   2134  Pt leaving floor with [facility]

## 2022-07-07 ENCOUNTER — PATIENT OUTREACH (OUTPATIENT)
Dept: CASE MANAGEMENT | Age: 52
End: 2022-07-07

## 2022-07-07 ENCOUNTER — HOME CARE VISIT (OUTPATIENT)
Dept: HOME HEALTH SERVICES | Facility: HOME HEALTH | Age: 52
End: 2022-07-07

## 2022-07-07 LAB
BACTERIA SPEC CULT: NORMAL
BACTERIA SPEC CULT: NORMAL
SERVICE CMNT-IMP: NORMAL
SERVICE CMNT-IMP: NORMAL

## 2022-07-07 NOTE — PROGRESS NOTES
Care Transitions Initial Call    Call within 2 business days of discharge: Yes     Patient: Loyal Schaumann Patient : 1970 MRN: [id number]    Last Discharge [address]       Complaint Diagnosis Description Type Department Provider    22 Fatigue COVID-19 . .. ED to Hosp-Admission (Discharged) (ADMIT) Nile Daigle MD; Rudi Licea. .. Was this an external facility discharge? No Discharge Facility: n/a    CTN Attempt to contact patient via telephone on 22  for post hospital  follow up. Unable to reach. Left message on voicemail with office contact information. No Patient medical information left on message. This episode is closed and resolved.        [address] follow up appointment(s):   Future Appointments   Date Time Provider Belkys Sheth   8/10/2022 10:30 Heather Sin MD [facility]

## 2022-07-10 ENCOUNTER — HOME CARE VISIT (OUTPATIENT)
Dept: HOME HEALTH SERVICES | Facility: HOME HEALTH | Age: 52
End: 2022-07-10

## 2022-07-10 NOTE — DISCHARGE SUMMARY
Discharge Summary    Patient: Chitra Rodrigues MRN: [id number]  CSN: [id number]    YOB: 1970  Age: 50 y.o. Sex: male    DOA: 6/20/2022 LOS:  LOS: 15 days   Discharge Date: 7/5/2022     Primary Care Provider:  Rubens Wright MD    Admission Diagnoses: [id number] [U07.1]    Discharge Diagnoses:    Problem List as of 7/5/2022 Date Reviewed: 6/24/2022          Codes Class Noted - Resolved    Sepsis ([facility] ICD-10-CM: A41.9  ICD-9-CM: 038.9, 995.91  6/22/2022 - Present        Bacteremia ICD-10-CM: R78.81  ICD-9-CM: 790.7  6/22/2022 - Present        Arteriovenous graft infection ([facility] ICD-10-CM: T82. 7XXA  ICD-9-CM: 996.62  6/22/2022 - Present        COVID-19 ICD-10-CM: U07.1  ICD-9-CM: 079.89  6/20/2022 - Present        * (Principal) Pulmonary edema ICD-10-CM: J81.1  ICD-9-CM: 948  6/20/2022 - Present        Volume overload ICD-10-CM: E87.70  ICD-9-CM: 276.69  6/20/2022 - Present        Hypertension ICD-10-CM: I10  ICD-9-CM: 401.9  6/1/2022 - Present        Uremia ICD-10-CM: N19  ICD-9-CM: 161  8/27/2021 - Present        Non-compliance with renal dialysis ([facility] ICD-10-CM: Z91.15  ICD-9-CM: V45.12  8/27/2021 - Present        ESRD (end stage renal disease) on dialysis [facility]) ICD-10-CM: N18.6, Z99.2  ICD-9-CM: 585.6, V45.11  1/29/2020 - Present        HTN (hypertension) ICD-10-CM: I10  ICD-9-CM: 401.9  1/29/2020 - Present        Hx of BKA, right ([place] [address].) ICD-10-CM: Z89.511  ICD-9-CM: V49.75  1/29/2020 - Present        Iron deficiency anemia ICD-10-CM: D50.9  ICD-9-CM: 280.9  3/4/2018 - Present        Other restrictive cardiomyopathy ([place] [address].) ICD-10-CM: I42.5  ICD-9-CM: 425.4  3/4/2018 - Present        RESOLVED: Stroke ([place] [address].) ICD-10-CM: I63.9  ICD-9-CM: 434.91  8/29/2021 - 9/2/2021        RESOLVED: Hypertensive crisis ICD-10-CM: I16.9  ICD-9-CM: 401.9  8/27/2021 - 6/1/2022        RESOLVED: Hypoglycemia associated with diabetes ([place] [address].) ICD-10-CM: E11.649  ICD-9-CM: 250.80  8/27/2021 - 6/1/2022        RESOLVED: Infective proctitis ICD-10-CM: K62.89  ICD-9-CM: 569.49  12/24/2020 - 6/1/2022        RESOLVED: Hypertensive urgency ICD-10-CM: I16.0  ICD-9-CM: 401.9  12/22/2020 - 6/1/2022        RESOLVED: Dehydration ICD-10-CM: E86.0  ICD-9-CM: 276.51  1/29/2020 - 9/2/2021        RESOLVED: Cellulitis and abscess of foot ICD-10-CM: L03.119, L02.619  ICD-9-CM: 682.7  1/29/2020 - 6/1/2022        RESOLVED: Acute osteomyelitis ([facility] ICD-10-CM: M86.10  ICD-9-CM: 730.00  1/29/2020 - 9/2/2021        RESOLVED: Esophagitis ICD-10-CM: K20.90  ICD-9-CM: 530.10  1/29/2020 - 9/2/2021        RESOLVED: Hyponatremia ICD-10-CM: E87.1  ICD-9-CM: 276.1  2/28/2018 - 6/1/2022        RESOLVED: Anemia of chronic disease ICD-10-CM: D63.8  ICD-9-CM: 285.29  2/28/2018 - 9/2/2021        RESOLVED: Acute on chronic renal insufficiency ICD-10-CM: N28.9, N18.9  ICD-9-CM: 593.9, 585.9  2/28/2018 - 6/1/2022        RESOLVED: Acute exacerbation of CHF (congestive heart failure) ([facility] ICD-10-CM: I50.9  ICD-9-CM: 428.0  2/28/2018 - 9/2/2021        RESOLVED: Abdominal pain with vomiting ICD-10-CM: R10.9, R11.10  ICD-9-CM: 789.00, 787.03  12/22/2020 - 9/2/2021        RESOLVED: Diarrhea ICD-10-CM: R19.7  ICD-9-CM: 787.91  12/22/2020 - 9/2/2021              Discharge Medications:     Discharge Medication List as of 7/5/2022  3:59 PM      START taking these medications    Details   calcitRIOL (ROCALTROL) 0.25 mcg capsule Take 1 Capsule by mouth every Monday, Wednesday, Friday., No Print, Disp-1 Capsule, R-0      epoetin delonte-epbx (RETACRIT) 20,000 unit/mL injection 1 mL by SubCUTAneous route every Monday, Wednesday, Friday. Indications: anemia due to kidney failure, Normal, Disp-1 Each, R-0         CONTINUE these medications which have NOT CHANGED    Details   carvediloL (COREG) 12.5 mg tablet Take 1 Tablet by mouth two (2) times daily (with meals) for 30 days. , Normal, Disp-60 Tablet, R-0      isosorbide mononitrate ER (IMDUR) 60 mg CR tablet Take 1 Tablet by mouth daily. , Normal, Disp-30 Tablet, R-0      cyanocobalamin 1,000 mcg tablet Take 1 Tablet by mouth daily. , Normal, Disp-30 Tablet, R-0      aspirin delayed-release 81 mg tablet Take 1 Tablet by mouth daily. , Print, Disp-30 Tablet, R-0      doxazosin (CARDURA) 4 mg tablet Take 2 mg by mouth daily. , Historical Med      sodium hypochlorite 0.0125% (DAKINS SOLUTION) Apply 1 mL to affected area two (2) times a week., Historical Med      omeprazole (PRILOSEC) 40 mg capsule Take 40 mg by mouth daily. , Historical Med      sevelamer carbonate (RENVELA) 800 mg tab tab Take 800 mg by mouth three (3) times daily (with meals). , Historical Med      sodium bicarbonate 650 mg tablet Take 650 mg by mouth three (3) times daily. , Historical Med         STOP taking these medications       furosemide (Lasix) 80 mg tablet Comments:   Reason for Stopping:               Discharge Condition: none    Procedures : none    Consults: nephrology, infectious disease      PHYSICAL EXAM   Visit Vitals  BP (!) 118/47   Pulse 80   Temp 98.5 °F (36.9 °C)   Resp 16   Ht 5' 7\" (1.702 m)   Wt 71.5 kg (157 lb 11.2 oz)   SpO2 100%   BMI 24.70 kg/m²     General: Awake, cooperative, no acute distress    HEENT: NC, Atraumatic. PERRLA, EOMI. Anicteric sclerae. Lungs:  CTA Bilaterally. No Wheezing/Rhonchi/Rales. Heart:  Regular  rhythm,  No murmur, No Rubs, No Gallops  Abdomen: Soft, Non distended, Non tender. +Bowel sounds,   Extremities: No c/c/e  Psych:   Not anxious or agitated. Neurologic:  No acute neurological deficits. Admission HPI :     Syed To is a 54 y.o. male with end-stage renal disease, diabetes, CHF, hypertension, s/p right BKA presents to ER with concerns of not feeling well, fever congestion cough and shortness of breath. Patient reports that he has been feeling the symptoms since last Wednesday after his dialysis. He missed his dialysis last Friday.   He reports that he is feeling tired, fatigued, on and off fever, congestion and cough. Also felt some shortness of breath. He denies any chest pain. In ER he was noted to have systolic blood pressure above 200. His BUN/creatinine at 67/16.4, high-sensitivity troponin at 86, NT proBNP at 92,443. Chest x-ray showed pulmonary edema, he was COVID-19 positive. Hospital Course :     Chief complaint :  Fever, cough, congestion  54 y.o. male with end-stage renal disease, diabetes, CHF, hypertension, s/p right BKA presents to ER with concerns of not feeling well, fever congestion cough and shortness of breath.     DC today with f/u at [facility] for HD  Miscommunication with DC yesterday results in pt not being able to DC yesterday  [address]  [facility] has agreed to increase their visits to 3-4 times per week for two weeks to assist with dressing changes     Sepsis -  POA  Secondary to bacteremia  Source TDC vs AVG     Persistent Bacteremia -  Blood cultures growing MSSA  Blood cultures from 06/29 with coag negative staph, likely contaminant. repeat cultures negative. On cefazolin. ID following. Cefazolin 2/2/3 gm post HD MWF- X6 weeks from 6/24/22. End date: August 4    Diarrhea now better, continue with probiotics. Monitor for CDI.      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 S/p TDC placement.     Volume overload/pulmonary edema-  Resolved with Fluid removal with HD     ESRD-  Nephrology following  HD per nephrology     Hypertension-  Monitor BP  Continue with home medications.     COVID 19-  Not hypoxic, monitor  Repeat rapid COVID 19 positive.     DM-  Started on sliding scale insulin, ADA diet.   Blood sugars low, stop lantus.     Right BKA    Activity: as tolerated    Diet: as tolerated    Follow-up: see above    Disposition: Home with [facility]    Minutes spent on discharge: 45 minutes      Labs: Results:       Chemistry No results for input(s): GLU, NA, K, CL, CO2, BUN, CREA, CA, AGAP, BUCR, TBIL, AP, TP, ALB, GLOB, AGRAT in the last 72 hours. No lab exists for component: GPT   CBC w/Diff No results for input(s): WBC, RBC, HGB, HCT, PLT, GRANS, LYMPH, EOS, HGBEXT, HCTEXT, PLTEXT in the last 72 hours. Cardiac Enzymes No results for input(s): CPK, CKND1, WILLIE in the last 72 hours. No lab exists for component: CKRMB, TROIP   Coagulation No results for input(s): PTP, INR, APTT, INREXT in the last 72 hours. Lipid Panel Lab Results   Component Value Date/Time    Cholesterol, total 115 06/02/2022 09:45 AM    HDL Cholesterol 62 (H) 06/02/2022 09:45 AM    LDL, calculated 46 06/02/2022 09:45 AM    VLDL, calculated 7 06/02/2022 09:45 AM    Triglyceride 35 06/02/2022 09:45 AM    CHOL/HDL Ratio 1.9 06/02/2022 09:45 AM      BNP No results for input(s): BNPP in the last 72 hours. Liver Enzymes No results for input(s): TP, ALB, TBIL, AP in the last 72 hours. No lab exists for component: SGOT, GPT, DBIL   Thyroid Studies Lab Results   Component Value Date/Time    TSH 2.38 08/29/2021 01:20 AM            Significant Diagnostic Studies: XR CHEST PORT    Result Date: 6/20/2022  EXAM: XR CHEST PORT CLINICAL INDICATION/HISTORY: meets SIRS criteria -Additional: None COMPARISON: 6/2/2022 TECHNIQUE: Frontal view of the chest _______________ FINDINGS: HEART AND MEDIASTINUM: Right IJV vascular catheter tip at the SVC. Cardiomegaly. LUNGS AND PLEURAL SPACES: Interstitial/parenchymal opacities. No large effusion or pneumothorax. BONY THORAX AND SOFT TISSUES: No acute osseous abnormality _______________     Cardiomegaly with mild pulmonary edema. ECHO ADULT COMPLETE    Result Date: 6/22/2022    Left Ventricle: Normal left ventricular systolic function with a visually estimated EF of 55 - 60%. Left ventricle size is normal. Moderately increased wall thickness. Normal wall motion. Abnormal diastolic function.   Aortic Valve: Tricuspid valve.  Mildly calcified cusp.   Left Atrium: Left atrium is mildly dilated.   Pericardium: Small (<1 cm) pericardial effusion present. No indication of cardiac tamponade.   Unable to assess RVSP due to inadequate or insignificant tricuspid regurgitation.   Mitral annular calcification and calcification of the posterior mitral leaflet. There is a mobile probably calcified nodule on the posterior leaflet of the mitral valve. Vegetation cannot be ruled out on this transthoracic study. Consider BRENDEN for further evaluation. DUPLEX HEMODIALYSIS ACCESS LEFT    Result Date: 6/23/2022  · Adjacent to mid portion of the occluded graft complex cystic mass noted measuring 1.8 trv x 1.4 ap cm          No results found for this or any previous visit.         CC: Travis Nelson MD

## 2022-07-12 ENCOUNTER — HOME CARE VISIT (OUTPATIENT)
Dept: HOME HEALTH SERVICES | Facility: HOME HEALTH | Age: 52
End: 2022-07-12

## 2022-07-12 NOTE — CASE COMMUNICATION
Multiple attempts to reach patient to schedule SN admit visit for home health, unable to reach patient and unable to leave messages, as his voicemail is full.  3 different attempts made, patient is a non-admit. Case communication with Emelina Betancourt MD and schedulers.

## 2022-08-26 ENCOUNTER — HOSPITAL ENCOUNTER (INPATIENT)
Age: 52
LOS: 4 days | Discharge: HOME HEALTH | DRG: 304 | End: 2022-08-30
Attending: EMERGENCY MEDICINE | Admitting: HOSPITALIST
Payer: MEDICARE

## 2022-08-26 ENCOUNTER — APPOINTMENT (OUTPATIENT)
Dept: GENERAL RADIOLOGY | Age: 52
DRG: 304 | End: 2022-08-26
Attending: EMERGENCY MEDICINE
Payer: MEDICARE

## 2022-08-26 DIAGNOSIS — I16.1 HYPERTENSIVE EMERGENCY: ICD-10-CM

## 2022-08-26 DIAGNOSIS — N18.6 ESRD (END STAGE RENAL DISEASE) (HCC): Primary | ICD-10-CM

## 2022-08-26 DIAGNOSIS — R06.02 SHORTNESS OF BREATH: ICD-10-CM

## 2022-08-26 PROBLEM — R09.02 HYPOXIA: Status: ACTIVE | Noted: 2022-08-26

## 2022-08-26 LAB
ALBUMIN SERPL-MCNC: 2.9 G/DL (ref 3.4–5)
ALBUMIN/GLOB SERPL: 0.6 {RATIO} (ref 0.8–1.7)
ALP SERPL-CCNC: 102 U/L (ref 45–117)
ALT SERPL-CCNC: 31 U/L (ref 16–61)
ANION GAP SERPL CALC-SCNC: 13 MMOL/L (ref 3–18)
AST SERPL-CCNC: 17 U/L (ref 10–38)
BASOPHILS # BLD: 0 K/UL (ref 0–0.1)
BASOPHILS NFR BLD: 0 % (ref 0–2)
BILIRUB SERPL-MCNC: 0.7 MG/DL (ref 0.2–1)
BUN SERPL-MCNC: 85 MG/DL (ref 7–18)
BUN/CREAT SERPL: 7 (ref 12–20)
CALCIUM SERPL-MCNC: 8.9 MG/DL (ref 8.5–10.1)
CHLORIDE SERPL-SCNC: 102 MMOL/L (ref 100–111)
CO2 SERPL-SCNC: 21 MMOL/L (ref 21–32)
CREAT SERPL-MCNC: 11.5 MG/DL (ref 0.6–1.3)
DIFFERENTIAL METHOD BLD: ABNORMAL
EOSINOPHIL # BLD: 0.3 K/UL (ref 0–0.4)
EOSINOPHIL NFR BLD: 5 % (ref 0–5)
ERYTHROCYTE [DISTWIDTH] IN BLOOD BY AUTOMATED COUNT: 19.4 % (ref 11.6–14.5)
GLOBULIN SER CALC-MCNC: 5.2 G/DL (ref 2–4)
GLUCOSE BLD STRIP.AUTO-MCNC: 121 MG/DL (ref 70–110)
GLUCOSE SERPL-MCNC: 74 MG/DL (ref 74–99)
HCT VFR BLD AUTO: 27.9 % (ref 36–48)
HGB BLD-MCNC: 9.4 G/DL (ref 13–16)
IMM GRANULOCYTES # BLD AUTO: 0 K/UL (ref 0–0.04)
IMM GRANULOCYTES NFR BLD AUTO: 0 % (ref 0–0.5)
INR PPP: 1.2 (ref 0.8–1.2)
LIPASE SERPL-CCNC: 103 U/L (ref 73–393)
LYMPHOCYTES # BLD: 0.5 K/UL (ref 0.9–3.6)
LYMPHOCYTES NFR BLD: 9 % (ref 21–52)
MCH RBC QN AUTO: 27.2 PG (ref 24–34)
MCHC RBC AUTO-ENTMCNC: 33.7 G/DL (ref 31–37)
MCV RBC AUTO: 80.9 FL (ref 78–100)
MONOCYTES # BLD: 0.5 K/UL (ref 0.05–1.2)
MONOCYTES NFR BLD: 9 % (ref 3–10)
NEUTS SEG # BLD: 4.1 K/UL (ref 1.8–8)
NEUTS SEG NFR BLD: 75 % (ref 40–73)
NRBC # BLD: 0 K/UL (ref 0–0.01)
NRBC BLD-RTO: 0 PER 100 WBC
PLATELET # BLD AUTO: 124 K/UL (ref 135–420)
POTASSIUM SERPL-SCNC: 4.1 MMOL/L (ref 3.5–5.5)
PROT SERPL-MCNC: 8.1 G/DL (ref 6.4–8.2)
PROTHROMBIN TIME: 15.9 SEC (ref 11.5–15.2)
RBC # BLD AUTO: 3.45 M/UL (ref 4.35–5.65)
SODIUM SERPL-SCNC: 136 MMOL/L (ref 136–145)
TROPONIN-HIGH SENSITIVITY: 44 NG/L (ref 0–78)
WBC # BLD AUTO: 5.4 K/UL (ref 4.6–13.2)

## 2022-08-26 PROCEDURE — 71045 X-RAY EXAM CHEST 1 VIEW: CPT

## 2022-08-26 PROCEDURE — 85025 COMPLETE CBC W/AUTO DIFF WBC: CPT

## 2022-08-26 PROCEDURE — 74011250636 HC RX REV CODE- 250/636: Performed by: STUDENT IN AN ORGANIZED HEALTH CARE EDUCATION/TRAINING PROGRAM

## 2022-08-26 PROCEDURE — 99285 EMERGENCY DEPT VISIT HI MDM: CPT

## 2022-08-26 PROCEDURE — 5A1D70Z PERFORMANCE OF URINARY FILTRATION, INTERMITTENT, LESS THAN 6 HOURS PER DAY: ICD-10-PCS | Performed by: STUDENT IN AN ORGANIZED HEALTH CARE EDUCATION/TRAINING PROGRAM

## 2022-08-26 PROCEDURE — 65610000006 HC RM INTENSIVE CARE

## 2022-08-26 PROCEDURE — 83690 ASSAY OF LIPASE: CPT

## 2022-08-26 PROCEDURE — 74011250636 HC RX REV CODE- 250/636: Performed by: HOSPITALIST

## 2022-08-26 PROCEDURE — 80053 COMPREHEN METABOLIC PANEL: CPT

## 2022-08-26 PROCEDURE — 74011250637 HC RX REV CODE- 250/637: Performed by: HOSPITALIST

## 2022-08-26 PROCEDURE — 85610 PROTHROMBIN TIME: CPT

## 2022-08-26 PROCEDURE — 74011000250 HC RX REV CODE- 250: Performed by: HOSPITALIST

## 2022-08-26 PROCEDURE — 74011000250 HC RX REV CODE- 250: Performed by: EMERGENCY MEDICINE

## 2022-08-26 PROCEDURE — 96374 THER/PROPH/DIAG INJ IV PUSH: CPT

## 2022-08-26 PROCEDURE — 82962 GLUCOSE BLOOD TEST: CPT

## 2022-08-26 PROCEDURE — 96375 TX/PRO/DX INJ NEW DRUG ADDON: CPT

## 2022-08-26 PROCEDURE — G0257 UNSCHED DIALYSIS ESRD PT HOS: HCPCS

## 2022-08-26 PROCEDURE — 93005 ELECTROCARDIOGRAM TRACING: CPT

## 2022-08-26 PROCEDURE — 84484 ASSAY OF TROPONIN QUANT: CPT

## 2022-08-26 PROCEDURE — 74011000258 HC RX REV CODE- 258: Performed by: EMERGENCY MEDICINE

## 2022-08-26 RX ORDER — ACETAMINOPHEN 325 MG/1
650 TABLET ORAL
Status: DISCONTINUED | OUTPATIENT
Start: 2022-08-26 | End: 2022-08-31 | Stop reason: HOSPADM

## 2022-08-26 RX ORDER — ACETAMINOPHEN 650 MG/1
650 SUPPOSITORY RECTAL
Status: DISCONTINUED | OUTPATIENT
Start: 2022-08-26 | End: 2022-08-31 | Stop reason: HOSPADM

## 2022-08-26 RX ORDER — FACIAL-BODY WIPES
10 EACH TOPICAL DAILY PRN
Status: DISCONTINUED | OUTPATIENT
Start: 2022-08-26 | End: 2022-08-31 | Stop reason: HOSPADM

## 2022-08-26 RX ORDER — HYDRALAZINE HYDROCHLORIDE 20 MG/ML
10 INJECTION INTRAMUSCULAR; INTRAVENOUS
Status: DISCONTINUED | OUTPATIENT
Start: 2022-08-26 | End: 2022-08-31 | Stop reason: HOSPADM

## 2022-08-26 RX ORDER — HEPARIN SODIUM 1000 [USP'U]/ML
3200 INJECTION, SOLUTION INTRAVENOUS; SUBCUTANEOUS
Status: DISCONTINUED | OUTPATIENT
Start: 2022-08-26 | End: 2022-08-31 | Stop reason: HOSPADM

## 2022-08-26 RX ORDER — SODIUM CHLORIDE 0.9 % (FLUSH) 0.9 %
5-40 SYRINGE (ML) INJECTION EVERY 8 HOURS
Status: DISCONTINUED | OUTPATIENT
Start: 2022-08-26 | End: 2022-08-31 | Stop reason: HOSPADM

## 2022-08-26 RX ORDER — ASPIRIN 81 MG/1
81 TABLET ORAL DAILY
Status: DISCONTINUED | OUTPATIENT
Start: 2022-08-27 | End: 2022-08-31 | Stop reason: HOSPADM

## 2022-08-26 RX ORDER — SODIUM BICARBONATE 650 MG/1
650 TABLET ORAL 3 TIMES DAILY
Status: DISCONTINUED | OUTPATIENT
Start: 2022-08-26 | End: 2022-08-29

## 2022-08-26 RX ORDER — ONDANSETRON 2 MG/ML
4 INJECTION INTRAMUSCULAR; INTRAVENOUS
Status: DISCONTINUED | OUTPATIENT
Start: 2022-08-26 | End: 2022-08-31 | Stop reason: HOSPADM

## 2022-08-26 RX ORDER — DOXERCALCIFEROL 4 UG/2ML
4 INJECTION INTRAVENOUS
Status: DISCONTINUED | OUTPATIENT
Start: 2022-08-26 | End: 2022-08-31 | Stop reason: HOSPADM

## 2022-08-26 RX ORDER — SEVELAMER CARBONATE 800 MG/1
1600 TABLET, FILM COATED ORAL
Status: DISCONTINUED | OUTPATIENT
Start: 2022-08-26 | End: 2022-08-31 | Stop reason: HOSPADM

## 2022-08-26 RX ORDER — SODIUM CHLORIDE 0.9 % (FLUSH) 0.9 %
5-40 SYRINGE (ML) INJECTION AS NEEDED
Status: DISCONTINUED | OUTPATIENT
Start: 2022-08-26 | End: 2022-08-31 | Stop reason: HOSPADM

## 2022-08-26 RX ORDER — ISOSORBIDE MONONITRATE 60 MG/1
60 TABLET, EXTENDED RELEASE ORAL DAILY
Status: DISCONTINUED | OUTPATIENT
Start: 2022-08-27 | End: 2022-08-31 | Stop reason: HOSPADM

## 2022-08-26 RX ORDER — HEPARIN SODIUM 5000 [USP'U]/ML
5000 INJECTION, SOLUTION INTRAVENOUS; SUBCUTANEOUS EVERY 8 HOURS
Status: DISCONTINUED | OUTPATIENT
Start: 2022-08-26 | End: 2022-08-31 | Stop reason: HOSPADM

## 2022-08-26 RX ORDER — SEVELAMER CARBONATE 800 MG/1
800 TABLET, FILM COATED ORAL
Status: DISCONTINUED | OUTPATIENT
Start: 2022-08-26 | End: 2022-08-26

## 2022-08-26 RX ORDER — LOSARTAN POTASSIUM 50 MG/1
100 TABLET ORAL DAILY
Status: DISCONTINUED | OUTPATIENT
Start: 2022-08-27 | End: 2022-08-31 | Stop reason: HOSPADM

## 2022-08-26 RX ORDER — PANTOPRAZOLE SODIUM 40 MG/1
40 TABLET, DELAYED RELEASE ORAL
Status: DISCONTINUED | OUTPATIENT
Start: 2022-08-27 | End: 2022-08-31 | Stop reason: HOSPADM

## 2022-08-26 RX ORDER — NICARDIPINE HYDROCHLORIDE 0.1 MG/ML
5-15 INJECTION INTRAVENOUS
Status: DISCONTINUED | OUTPATIENT
Start: 2022-08-26 | End: 2022-08-26 | Stop reason: RX

## 2022-08-26 RX ORDER — CALCITRIOL 0.25 UG/1
0.25 CAPSULE ORAL
Status: DISCONTINUED | OUTPATIENT
Start: 2022-08-26 | End: 2022-08-29

## 2022-08-26 RX ORDER — AMLODIPINE BESYLATE 5 MG/1
10 TABLET ORAL DAILY
Status: DISCONTINUED | OUTPATIENT
Start: 2022-08-26 | End: 2022-08-31 | Stop reason: HOSPADM

## 2022-08-26 RX ORDER — PROMETHAZINE HYDROCHLORIDE 25 MG/1
12.5 TABLET ORAL
Status: DISCONTINUED | OUTPATIENT
Start: 2022-08-26 | End: 2022-08-31 | Stop reason: HOSPADM

## 2022-08-26 RX ADMIN — SODIUM CHLORIDE 5 MG/HR: 9 INJECTION, SOLUTION INTRAVENOUS at 15:24

## 2022-08-26 RX ADMIN — SODIUM CHLORIDE 5 MG/HR: 9 INJECTION, SOLUTION INTRAVENOUS at 23:42

## 2022-08-26 RX ADMIN — HYDRALAZINE HYDROCHLORIDE 10 MG: 20 INJECTION INTRAMUSCULAR; INTRAVENOUS at 19:14

## 2022-08-26 RX ADMIN — ACETAMINOPHEN 650 MG: 325 TABLET ORAL at 23:14

## 2022-08-26 RX ADMIN — CALCITRIOL 0.25 MCG: 0.25 CAPSULE ORAL at 23:42

## 2022-08-26 RX ADMIN — SODIUM CHLORIDE, PRESERVATIVE FREE 10 ML: 5 INJECTION INTRAVENOUS at 23:15

## 2022-08-26 RX ADMIN — AMLODIPINE BESYLATE 10 MG: 5 TABLET ORAL at 23:42

## 2022-08-26 RX ADMIN — HEPARIN SODIUM 3200 UNITS: 1000 INJECTION INTRAVENOUS; SUBCUTANEOUS at 21:53

## 2022-08-26 NOTE — ED TRIAGE NOTES
Pt arrived via ems after he was sent to the ED from his dialysis center [facility]). Per pt, the Nephrologist sent him to the ED d/t his BP. Pt received 90 sec of dialysis tx today and was stopped because of his high BP. his last full dialysis tx was 8/17/22. Dialysis catheter to right upper chest.    O2 sats 87% room air.  Pt denies any c/o SOB or h/a. 2L o2 via NC applied an d increased to 97%

## 2022-08-26 NOTE — ED PROVIDER NOTES
73-year-old male history of CKD on dialysis, diabetes heart failure hypertension right BKA noncompliance with medications presents emergency department from his dialysis center. Patient has missed dialysis for over 2 weeks showed up today for dialysis was found to be 2 hypertension. He was sent to the Emergency Room by his nephrologist for hypertensive urgency need for dialysis. Dr. Edwin Ghosh has seen him in the ED       Past Medical History:   Diagnosis Date    Chronic kidney disease     Diabetes ([facility]     Heart failure ([facility]     Hemodialysis patient ([facility]     Hypertension     Sickle cell trait ([facility]        Past Surgical History:   Procedure Laterality Date    HX ORTHOPAEDIC      right BKA 2017    IR DECLOT AV GRAFT PERCUTANEOUS  6/3/2022    IR INSERT NON TUNL CVC OVER 5 YRS  6/1/2022         History reviewed. No pertinent family history. Social History     Socioeconomic History    Marital status: LEGALLY      Spouse name: Not on file    Number of children: Not on file    Years of education: Not on file    Highest education level: Not on file   Occupational History    Not on file   Tobacco Use    Smoking status: Former    Smokeless tobacco: Never   Substance and Sexual Activity    Alcohol use: No    Drug use: No    Sexual activity: Not on file   Other Topics Concern    Not on file   Social History Narrative    Not on file     Social Determinants of Health     Financial Resource Strain: Not on file   Food Insecurity: Not on file   Transportation Needs: Not on file   Physical Activity: Not on file   Stress: Not on file   Social Connections: Not on file   Intimate Partner Violence: Not on file   Housing Stability: Not on file         ALLERGIES: Penicillins and Vancomycin    Review of Systems   Constitutional:  Positive for fatigue. HENT: Negative. Eyes: Negative. Respiratory: Negative. Gastrointestinal: Negative. Genitourinary: Negative. Musculoskeletal: Negative. Neurological: Negative. Hematological: Negative. Psychiatric/Behavioral: Negative. Vitals:    08/26/22 1622 08/26/22 1623 08/26/22 1630 08/26/22 1649   BP: (!) 184/93  (!) 179/88 (!) 181/76   Pulse: 86 85 87 87   Resp: 11 20 25 21   Temp:       SpO2: 98% 100% 97% 96%   Weight:       Height:                Physical Exam  Vitals and nursing note reviewed. Constitutional:       General: He is not in acute distress. Appearance: Normal appearance. He is ill-appearing. He is not toxic-appearing or diaphoretic. HENT:      Head: Normocephalic and atraumatic. Nose: Nose normal. No congestion or rhinorrhea. Mouth/Throat:      Mouth: Mucous membranes are moist.   Eyes:      General:         Right eye: No discharge. Left eye: No discharge. Extraocular Movements: Extraocular movements intact. Conjunctiva/sclera: Conjunctivae normal.      Pupils: Pupils are equal, round, and reactive to light. Cardiovascular:      Rate and Rhythm: Normal rate. Pulses: Normal pulses. Heart sounds: Murmur heard. No friction rub. No gallop. Pulmonary:      Effort: Pulmonary effort is normal. No respiratory distress. Breath sounds: No stridor. Rales present. No wheezing or rhonchi. Abdominal:      General: Abdomen is flat. Bowel sounds are normal. There is no distension. Palpations: Abdomen is soft. There is no mass. Tenderness: There is no abdominal tenderness. Hernia: No hernia is present. Musculoskeletal:         General: Normal range of motion. Cervical back: Normal range of motion. Right lower leg: Edema present. Skin:     General: Skin is warm. Capillary Refill: Capillary refill takes less than 2 seconds. Coloration: Skin is pale. Neurological:      General: No focal deficit present. Mental Status: He is alert and oriented to person, place, and time. Mental status is at baseline.    Psychiatric:         Mood and Affect: Mood normal.         Behavior: Behavior normal.         Thought Content: Thought content normal.         Judgment: Judgment normal.        MDM  Number of Diagnoses or Management Options  Diagnosis management comments: 77-year-old male presents emergency department from dialysis center due to hypertensive urgency/emergency. Patient noncompliant with any of his medications he is noncompliant with dialysis showed up for dialysis after 2 to 3 weeks of being absent was found to be extremely hypertensive and hypoxic and sent to the emergency room for treatment and evaluation. I did speak to his nephrologist prior to his arrival.  Patient arrives to the ED at 89% on room air with a blood pressure of 341 systolic. Patient is in acute distress on arrival on exam he is speaking in full sentences however there are moderate Rales in most of his lung fields and he is hypoxic. He has a significant ejection murmur abdomen is soft with bilaterally upper and lower quadrants. He has bilateral lower extremity leg swelling. IV was started labs were sent patient was started on a Cardene titratable drip. He did nephrology saw patient in the emergency room. Plan for Cardene drip until reaches a blood pressure between 546 and 080 systolic we will stop drip at that time and start oral medications patient will be admitted for hypertensive urgency undergo dialysis while he is here with blood pressure goal reductions.   Case was discussed with Dr. Fannie Sharif of internal medicine patient be admitted to the hospitalist service       Critical Care    Date/Time: 8/27/2022 8:37 AM  Performed by: Mojgan Kurtz MD  Authorized by: Mojgan Kurtz MD     Critical care provider statement:     Critical care time (minutes):  30    Critical care time was exclusive of:  Separately billable procedures and treating other patients and teaching time    Critical care was necessary to treat or prevent imminent or life-threatening deterioration of the following conditions:  Renal failure and cardiac failure    Critical care was time spent personally by me on the following activities:  Blood draw for specimens, development of treatment plan with patient or surrogate, discussions with consultants, evaluation of patient's response to treatment, examination of patient, interpretation of cardiac output measurements, obtaining history from patient or surrogate, ordering and performing treatments and interventions, ordering and review of laboratory studies, ordering and review of radiographic studies, pulse oximetry, re-evaluation of patient's condition and review of old charts

## 2022-08-26 NOTE — PROGRESS NOTES
Intervention: Monitor/Manage Fluid Electrolyte/Acid Base Balance    PROBLEM: HEMODIALYSIS ADULT    INTERVENTION: Hemodynamic stabilization  Maintain BP WNL for this patient while on hemodialysis    INTERVENTION: Fluid Management  Will attempt 3000 ml total fluid removal as tolerated    INTERVENTION: Metabolic / Electrolyte Imbalance Management  2 K+ potassium, 2.5 Ca calcium bath today with dialysis    GOAL: Signs and symptoms of listed potential problems will be absent or manageable  (reference Hemodialysis ADULT CPG)    OUTCOME: Progressing  HD planned for 3.5 hours today      Problem: Chronic Renal Failure  Goal: *Fluid and electrolytes stabilized  Outcome: Progressing Towards Goal     Problem: Patient Education: Go to Patient Education Activity  Goal: Patient/Family Education  Outcome: Progressing Towards Goal

## 2022-08-26 NOTE — ED NOTES
Dialysis nurse at the bedside. Cardene placed on hold at this time per Nephrologist order to hold for SBP < 190.

## 2022-08-26 NOTE — Clinical Note
Status[de-identified] INPATIENT [101]   Type of Bed: Telemetry [19]   Cardiac Monitoring Required?: Yes   Inpatient Hospitalization Certified Necessary for the Following Reasons: 3.  Patient receiving treatment that can only be provided in an inpatient setting (further clarification in H&P documentation)   Admitting Diagnosis: Hypertensive emergency [[id number]]   Admitting Diagnosis: ESRD (end stage renal disease) [facility]) [[id number]]   Admitting Diagnosis: Medically noncompliant [[id number]]   Admitting Physician: Jessie Reza [[id number]]   Attending Physician: Jessie Reza [[id number]]   Estimated Length of Stay: 3-4 Midnights   Discharge Plan[de-identified] Home with Office Follow-up

## 2022-08-26 NOTE — CONSULTS
RENAL CONSULT  2022    Patient:  Ac Madden  :  1970  Gender:  male  MRN #:  [id number]    Reason for Consult: Hypertensive urgency and pulmonary edema ED physician Dr Mike Sanon     History of Present Illness:    Ac Madden is a 50y.o. year old maleh/o ESRD on HD , non compliant with dialysis /skip multiple dialysis session every month despite knowing the risk was sent from dialysis unit because of very high blood pressure   His BP was 250/158 mmhg in dialysis unit   He was give clonidine 0.1 mg x 2 times and was sent to ED    Today he told me that he has not had blood pressure medicine for long  time and white snot have medicine at home   In ED B was 233/109   Started on nicardipine drip         Past Medical History:   Diagnosis Date    Chronic kidney disease     Diabetes ([facility] [address]     Heart failure ([facility] [address]     Hemodialysis patient ([facility] [address]     Hypertension     Sickle cell trait ([facility] [address]      Past Surgical History:   Procedure Laterality Date    HX ORTHOPAEDIC      right BKA     IR DECLOT AV GRAFT PERCUTANEOUS  6/3/2022    IR INSERT NON TUNL CVC OVER 5 YRS  2022     History reviewed. No pertinent family history. Allergies   Allergen Reactions    Penicillins Rash    Vancomycin Other (comments)     kayla syndrome     Current Facility-Administered Medications   Medication Dose Route Frequency Provider Last Rate Last Admin    niCARdipine (CARDENE) 25 mg in 0.9% sodium chloride 250 mL infusion  5-15 mg/hr IntraVENous TITRATE Stacey Mccain MD         Current Outpatient Medications   Medication Sig Dispense Refill    calcitRIOL (ROCALTROL) 0.25 mcg capsule Take 1 Capsule by mouth every Monday, Wednesday, Friday. 1 Capsule 0    epoetin delonte-epbx (RETACRIT) 20,000 unit/mL injection 1 mL by SubCUTAneous route every Monday, Wednesday, Friday. Indications: anemia due to kidney failure 1 Each 0    isosorbide mononitrate ER (IMDUR) 60 mg CR tablet Take 1 Tablet by mouth daily.  30 Tablet 0    cyanocobalamin 1,000 mcg tablet Take 1 Tablet by mouth daily. 30 Tablet 0    aspirin delayed-release 81 mg tablet Take 1 Tablet by mouth daily. 30 Tablet 0    doxazosin (CARDURA) 4 mg tablet Take 2 mg by mouth daily. sodium hypochlorite 0.0125% (DAKINS SOLUTION) Apply 1 mL to affected area two (2) times a week. omeprazole (PRILOSEC) 40 mg capsule Take 40 mg by mouth daily. sevelamer carbonate (RENVELA) 800 mg tab tab Take 800 mg by mouth three (3) times daily (with meals). sodium bicarbonate 650 mg tablet Take 650 mg by mouth three (3) times daily. Review of Symptoms:     Consitutional Symptoms: No fever, weight loss, weight gain and fatigue  Eyes:- No change in vision , no itching   Ears, Nose, Mouth, Throat:  No neck pain , swelling ,hearing loss and nose bleed. Pulmonary: No cough and  but has shortness of breath . CVS: No  Chest pain , palpitation and orthopnea  GI: No nausea, vomiting, abdominal pain, and blood in stool   - very little urine   Neurological:No dizzy ness, syncope, focal weakness and numbness . Skin : No rash and erythema   Endocrine: No feeling of excessive cold or warmth, hot flushes  Psychiatric: Denied feeling depressed    Objective:    Visit Vitals  BP (!) 219/111   Pulse 80   Temp 97.6 °F (36.4 °C)   Resp 22   Ht 5' 7\" (1.702 m)   Wt 89 kg (196 lb 3.4 oz)   SpO2 97%   BMI 30.73 kg/m²       Physical Exam:    Pt awake,  alert and comfortable  HEENT: No JVD, anicteric sclera, no neck swelling  Lung: clear to auscultation, no crackles and wheeze  Heart: s1s2 regualr no rubs or murmur  Abdomen: soft, non tender, no guarding, normal bowel sounds.   Ext: trace edema  CNS- Oriented to time , place and person     Laboratory Data:    Lab Results   Component Value Date    BUN 65 (H) 06/26/2022    BUN 48 (H) 06/24/2022    BUN 36 (H) 06/23/2022     (L) 06/26/2022     (L) 06/24/2022     06/23/2022    CO2 24 06/26/2022    CO2 28 06/24/2022    CO2 27 06/23/2022 Lab Results   Component Value Date    WBC 5.4 08/26/2022    HGB 9.4 (L) 08/26/2022    HCT 27.9 (L) 08/26/2022     No components found for: CALCIUM, PHOSPHORUS, MAGNESIUM  Lab Results   Component Value Date    HDL 62 (H) 06/02/2022     No results found for: SPECIMENTYP, TURBIDITY, UGLU    Imaging Reveiwed:    CXR: Cardiomegaly and pulmonary edema. Assessment:    Forrest Mena is a 50y.o. year old maleh/o ESRD on HD , non compliant with dialysis /skip multiple dialysis session every month despite knowing the risk was sent from dialysis unit because of very high blood pressure . BP in ED was 233/109   CXR showed pulmonary edema       Hypertensive urgency   Pulmonary edema  ESRD  Non compliance  Hyperphosphatemia     Plan:      Hypertensive Urgency: Agree with nicardipine drip now to bring down systolic  mmhg , then we can stop it   Will plan for dialysis after that which should help to improve BP   Resume amlodipine 10 mg , losartan 100 mg and spironolactone 100 mg daily post dialysis depending on BP for target drop in BP by 20 % in next 24 hours     ESRD- he has pulmonary edema in CKR  Dialysis today , UF 3.5 kg  Next dialysis probably Monday if he remains stable in weekend     Bone mineral disease  : his phosphorous was 11 this month- start sevelamer 1600 mg TID   Has secondary hyperparathyroidism   Start hectoral 4 mcg with on dialysis day        Addendum to note dated : 08/26/2022       Approx 40   minutes of critical care time spent in the direct evaluation and formulation of treatment plan of this high risk patient. The reason for providing this level of medical care was due a critical illness that impaired one or more vital organ systems such that there was a high probability of imminent or life threatening deterioration in the patients condition.  This care involved high complexity decision making to assess, manipulate, and support vital system functions, to treat this degreee vital organ system failure and to prevent further life threatening deterioration of the patients condition.          José Manuel Ohara MD, MD  [facility].- Nephrology

## 2022-08-26 NOTE — PROGRESS NOTES
TREATMENT SUMMARY     Patient in room ER 11 dialyzed for 3.5 hours. Tolerated tx well with without complaint or complications. Right Subclavian CVC functioning without complication accessing or BFR  BFR  450 mL/min   mL/min  4000 ml UF removed with a net UF removal of 3500ml. Report given to Cora Valente RN with all questions answered. TREATMENT NOTES     1914 Hydralazine 10 mg administered via IV by Primary Nurse. 2021 Cardene restarted at 5 mg/hr via IV administered via IV by Primary Nurse. 2045 Cardene rate changed to 7 mg/hr administered via IV by Primary Nurse. 2145 Dressing to Right subclavian CVC changed. No s/s of infection noted to same. Patient remained hypertensive throughout tx but was asymptomatic. Dr Cesilia Bruno, Nephrologist.                                                                                               Colen Snellen DR. Ghimere    []1st Time Acute  [x]Stat[] Routine []Urgent []Chronic Unit   []Acute Room []Bedside  []ICU/CCU [x]ER   Isolation Precautions: [x]Dialysis[] Airborne []Contact []Droplet []Reverse    Special Considerations:_______  [] Blood Consent Verified  [x]N/A   Allergies:[] NKA                 [x] _Penicillins PenicillinsRashNot Jjfqieyal38/22/2020Deletion Reason: Vancomycin VancomycinOther (comments)Not Specified1/29/2020____________ Code Status [x]Full Code [] DNR  [] Other_____   Diet: [] Renal [] NPO [x] Diabetic   [] Enteral Feeding [] Cardiac Diabetic: [x]Yes []No     [x]Signed Treatment Consent Verified   [x] Time Out/ Safety Check   PRIMARY NURSE REPORT: FIRST INITIAL/ LAST NAME/TITLE  PRE DIALYSIS:    Joanne Beatty, RN                                       TIME: 4275   ACCESS   CATHETER ACCESS: [] N/A  [x] RIGHT  [] LEFT  [] IJ  [x] SUBCL [] FEM                    [] First use X-ray  [x] Tunnel     [] Non-Tunneled      [x] No S/S infection [] Redness [] Drainage  [] Cultured [] Swelling [] Pain                    [x] Medical Aseptic [x] Prep Dressing Changed                  [] Clotted [x] Patent []      Flows: [x] Good [] Poor [] Reversed                 If Access Problem Dr. Brown Bobo: [] Yes [] No    Date:_____  [] N/A[]   GRAFT/FISTULA ACCESS:  [x] N/A  [] RIGHT  [] LEFT  [] UE   [] LE       [] AVG  [] AVF [] BUTTONHOLE    [] +BRUIT/THRILL [] MEDICAL ASEPTIC PREP     [] No S/S infection  [] Redness [] Drainage  [] Cultured [] Swelling [] Pain              If Access Problem Dr. Brown Bobo: [] Yes [] No    Date:______ [] N/A   GENERAL ASSESSMENT   LUNGS:  SaO2% _100___ [] Clear [] Coarse [] Crackles [] Wheezing               [] Diminished Location: [] RLL [] LLL [] RUL [] KYLAH    COUGH:  [] Productive  [] Loose[] Dry [x] N/A  RESPIRATIONS: [x] Easy [] Labored    THERAPY: [] RA   [x] NC ___2__. L/min    Mask: [] NRB [] Venti  _____O2%                  [] Ventilator [] Intubated [] Trach [] BiPap [] CPap [] HI Flow   CARDIAC: [x] Regular [] Irregular [] Pericardial Rub [] JVD               Monitored Rhythm:______ [] N/A   EDEMA: [x] None [] Generalized [] Facial [] Pedal [] UE [] LE             [] Pitting [] 1 [] 2 [] 3 [] 4    [] Right [] Left [] Bilateral   SKIN:    [x] Warm [] Hot [] Cold  [x] Dry [] Pale [] Diaphoretic              [] Flushed [] Jaundiced [] Cyanotic [] Rash [] Weeping     LOC:    [x] Alert  Oriented to: [x] Person [x] Place [x] Time             [] Confused [] Lethargic [] Medicated [] Non-responsive    GI/ABDOMEN: [] Flat [] Distended [x] Soft [] Firm [] Diarrhea [x] Bowel Sounds Present [] Nausea [] Vomiting    PAIN: [x] 0 [] 1 [] 2 [] 3 [] 4 [] 5 [] 6 [] 7 [] 8 [] 9 [] 10          Scale 1-10 Action/Follow Up_____   MOBILITY: [] Amb [] Amb/Assist [x] Bed  [] Wheelchair    CURRENT LABS   HBsAg ONLY: Date Drawn:    06/01/2022         [x] Negative [] Positive [] Unknown.      HBsAb: Date Drawn:   06/01/2022           [] Susceptible <10 [x] Immune ?10 [] Unknown   Date of Current Labs:  ATTACHED     EDUCATION   Person Educated: [x] Patient [] Other_________   Knowledge base: [] None [x] Minimal [] Substantial    Barriers to learning  [x] None _______________   Preferred method of learning: [] Written [x] Oral [x] Visual [] Hands on    Topic: [x] Access Care [x] S&S of infection [x] Fluid Management   [] K+  [x] Procedural  [] Albumin [x] Medications [] Tx Options   [] Transplant [x] Diet [] Other    Teaching Tools: [x] Explain [] Demonstration [] Handout_____ [] Video______  CARE PLAN    [x] Renal Failure (Adult)  Interdisciplinary  Fluid and electrolytes stabilized  Interventions  Dehydration signs and symptoms (eg: Weight/lab monitoring; vomiting/diarrhea/urine; tenting; mucous membranes; dizziness/lethargy/irritability/confusion; weak pulse; tachycardia; blood pressure; I&O)  Fluid overload signs and symptoms assessment (eg: Body weight increased; dyspnea; edema; hypertension; respiratory crackles/wheezing; JVD; lab monitoring; mental status changes; I&O)  Monitor appropriate lab values  COMPLIANCE WITH PRESCRIBED THERAPY  ARTERIAL ACCESS SITE ASSESSMENT  NUTRITION SCREENING  Vital signs monitoring per assessed patient condition or unit standard  Cardiac monitoring  Hydration management  Intake and output measurement  Body weight monitoring  Skin care  DIALYSIS  Nutrition Care Process per nutrition screen  Oral hygiene care every 2 hours  Pain management     Outcome   [x] Progressing Towards Goal  [] Not Progressing Towards Goals  [] Goals Met/Resolved  [] Goals Not Met/ Resolved        Patient/ Family Education  Progressing Towards Goals          RO/HEMODIAYLSIS MACHINE SAFETY CHECKS- BEFORE EACH TREATMENT        [x] [facility]: Machine Serial #3:  [id number]  RO Serial #2:[id number]    Alarm Test: [x] Pass  Time________  [x] RO/Machine Log Complete    [x] Extracorporeal circuit Tested for integrity           Dialyzer__[id number]________ Tubing__22A29-11__________    Dialysate: pH__7.4_  Temp. _36___Conductivity: Meter __14__ HD Machine__14.2_   CHLORINE TESTING- BEFORE EACH TREATMENT AND EVERY 4 HOURS   Total Chlorine: [x] Less than 0.1 ppm Time:_1747____2nd Check Time:__N/A____  (If greater than 0.1 ppm from Primary then every 30 minutes from Secondary)   TREATMENT INIATION-WITH DIALYSIS PRECAUTIONS   [x] All Connections Secured   [x] Saline Line Double Clamped    [x] Venous Parameters Set [x] Arterial Parameters Set    [x] Prime Given 250 ml     [x] Air Foam Detector Engaged   PRE-TREATMENT   UF Calculations: Wt to lose:__3500__ml(+) Oral:__ml(+)IV Meds/Fluids/Blood prods___ml(+) Prime/Rinse_500__ml(=)Total UF Goal_4000___mL   Scale Type:[x] Bed scale [] Sling Scale [] Wheel Chair Scale []  Not Ordered [] [] Unable to obtain pt on stretcher/ bed scale malfunctioning   Tx Initiation Note:  Tx Initiation Note: Received patient in bed semi fowlers position. Right IJ CVC remains intact. No s/s of infection noted. Dressing to same changed. Treatment initiated as per MD order without incident. Plan to remove 3.5 liters for 3.5 hours while monitoring patient's well-being and vital signs. [x] Time Out/Safety Check  Time:_1759____   INTRADIALYTIC MONITORING  (SEE ATTACHED FLOWSHEET)     POST TREATMENT    Time Medication Dose Volume Route    Initials                                           [facility] Signatures Title Initials Time   Mattie Clifton RN GM 2156               Dialyzer cleared: [] Good [x] Fair [] Poor     Blood Processed __86. 9_Liters    Net UF Removed __3500__mL  Post Tx Access:                  AVF/AVG: Bleeding Stop       Art.___min Chaim.____min []+bruit/thrill                Catheter: Locking Solution [x] Heparin 1 ml/1000 units  [] Normal Saline                                                                               Art.__1.6___ ml Chaim.__1.6___ml  Post Assessment:              Skin: [x]Warm [x]Dry []Diaphoretic []Flushed [] Pale []Cyanotic            Lungs: [x]Clear []Coarse []Crackles []Wheezing             Cardiac: [x]Regular []Irregular  []Monitored rhythm____ []N/A            Edema: [x]None []General []Facial []Pedal  []UE []LE []RIGHT []LEFT            Pain: [x]0 []1 []2 []3 []4 []5 []6 []7 []8 []9 []10   POST Tx Note:    HD treatment completed and tolerated well. Patient rinsed with 250 mL 0.9% N/S. Heparin indwell 1.6 mL instilled in arterial lumen, 1.6 mL instilled in venous lumen of Right SUBCL CVC. Red Curos Caps applied to lumen ends securely. Patient remains in  bed ER 11 in stable condition. O2 Sat 95%. 2 L of O2 via nasal cannula in place. No acute complaints or distress noted.              Primary Nurse Report: First initial/Last name/Title    Post Dialysis:___Nikole Ruffin RN_______________________         Time:_____2155___________    Abbreviations: AVG-arterial venous graft, AVF-arterial venous fistula, IJ-Internal Jugular,  Subcl-Subclavian, Fem-Femoral, Tx-treatment, AP/HR-apical heart rate, DFR-dialysate flow rate, BFR-blood flow rate, AP-arterial pressure, -venous pressure, UF-ultrafiltrate, TMP-transmembrane pressure, Chaim-Venous, Art-Arterial, RO-Reverse Osmosis

## 2022-08-26 NOTE — H&P
History & Physical    Patient: Dima Lynn MRN: [id number]  CSN: [id number]    YOB: 1970  Age: 50 y.o. Sex: male      DOA: 8/26/2022  Primary Care Provider:  Samia Conner MD      Assessment/Plan     Hospital Problems  Date Reviewed: 6/24/2022            Codes Class Noted POA    Hypoxia ICD-10-CM: R09.02  ICD-9-CM: 799.02  8/26/2022 Unknown        * (Principal) Hypertensive emergency ICD-10-CM: I16.1  ICD-9-CM: 401.9  8/26/2022 Unknown        Pulmonary edema ICD-10-CM: J81.1  ICD-9-CM: 859  6/20/2022 Yes        Non-compliance with renal dialysis [facility]) ICD-10-CM: Z91.15  ICD-9-CM: V45.12  8/27/2021 Yes        ESRD (end stage renal disease) on dialysis [facility]) ICD-10-CM: N18.6, Z99.2  ICD-9-CM: 585.6, V45.11  1/29/2020 Yes        HTN (hypertension) ICD-10-CM: I10  ICD-9-CM: 401.9  1/29/2020 Yes        Hx of BKA, right ([address] ICD-10-CM: Z89.511  ICD-9-CM: V49.75  1/29/2020 Yes             Admit to tele   Cv:    Hypertension , hypertensive emergency  Off of Cardene drip, have to restart kapil gtt   hydralazine as needed, today gaol < 190, restart home med  Nephrology recommended Norvasc, spironolactone and losartan-prescription noted    Respiratory  Hypoxia, due to pulmonary edema, secondary due to miss hd -noncompliance  Will have HD today, continue education  Will have intensivist on board       Renal     esrd on hd -need urgent HD, renal on board, patient receiving HD in ER     Heme    Anemia -due to esrd , no bleeding reported   Will continue monitoring h/h       Sickle cell trait      Endo   History of Hypoglycemia  Put hypoglycemia protocol       Right BKA, fall precaution        Multiple admissions he will benefit to assisted living, care manager-will be on board          Full code  Please note that this dictation was completed with [employer], the [employer] voice recognition software.   Quite often unanticipated grammatical, syntax, homophones, and other interpretive errors are inadvertently transcribed by the computer software. Please disregard these errors. Please excuse any errors that have escaped final proofreading    70 minutes of critical care time spent in the direct evaluation and treatment of this high risk patient. The reason for providing this level of medical care for this critically ill patient was due a critical illness that impaired one or more vital organ systems such that there was a high probability of imminent or life threatening deterioration in the patients condition. This care involved high complexity decision making to assess, manipulate, and support vital system functions, to treat this degreee vital organ system failure and to prevent further life threatening deterioration of the patients condition. Estimate  length of stay : 2-3 day    DVT : heparin ppi proph  CC: Elevated blood pressure       HPI:     Pattie Nguyễn is a 50 y.o. male with history of hypertension, end-stage renal disease on HD, hyperlipidemia, noncompliance with dialysis , cardiomegaly presented to ER due to elevated blood pressure. He missed hemodialysis for  he presented to HD center today for HD, he was sent to ER directly from Center because of elevated blood pressure unable to perform HD. On arrival, he presented hypoxia with 89% at room air, SBP over 233. Cardene drip also started in ER his blood pressure down to 179. Patient denies any chest pain, no headache, no blurred vision. His oxygen got improving after blood pressure controlled. Checks x-ray indicated cardiomegaly and pulmonary edema. Nephrology has been consulted, will arrange HD today. His potassium 4.1, creatinine 11 BUN 85. He reported to fatigue. Denies any chest pain no shortness of breath no headache. He reported that he was admitted to [facility] recent today and he was intubated. From care everywhere, patient was admitted to [facility] due to hyper tensive emergency, he was intubated due to hematemesis.   Upper EGD was done:' showed small nonbleeding erosion in the body of the stomach from OG tube trauma. \"  He also has been treated for pneumonia and completed antibiotics. Visit Vitals  BP (!) 202/96   Pulse 90   Temp 97.6 °F (36.4 °C) (Oral)   Resp 18   Ht 5' 7\" (1.702 m)   Wt 89 kg (196 lb 3.4 oz)   SpO2 93%   BMI 30.73 kg/m²      O2 Device: Nasal cannula      Past Medical History:   Diagnosis Date    Chronic kidney disease     Diabetes ([facility]     Heart failure (HCC)     Hemodialysis patient ([facility]     Hypertension     Sickle cell trait ([facility]        Past Surgical History:   Procedure Laterality Date    HX ORTHOPAEDIC      right BKA     IR DECLOT AV GRAFT PERCUTANEOUS  6/3/2022    IR INSERT NON TUNL CVC OVER 5 YRS  2022     Family History    Family History  Medical History Relation Name Comments   Diabetes Father        Sickle cell anemia Mother          Family History  Relation Name Status Comments   Father         Mother          Social History     Socioeconomic History    Marital status: LEGALLY    Tobacco Use    Smoking status: Former    Smokeless tobacco: Never   Substance and Sexual Activity    Alcohol use: No    Drug use: No       Prior to Admission medications    Medication Sig Start Date End Date Taking? Authorizing Provider   calcitRIOL (ROCALTROL) 0.25 mcg capsule Take 1 Capsule by mouth every Monday, Wednesday, Friday. 22   Vingesh Arambula MD   epoetin delonte-epbx (RETACRIT) 20,000 unit/mL injection 1 mL by SubCUTAneous route every Monday, Wednesday, Friday. Indications: anemia due to kidney failure 22   Vignesh Arambula MD   isosorbide mononitrate ER (IMDUR) 60 mg CR tablet Take 1 Tablet by mouth daily. 22   May Albarado MD   cyanocobalamin 1,000 mcg tablet Take 1 Tablet by mouth daily. 22   May Albarado MD   aspirin delayed-release 81 mg tablet Take 1 Tablet by mouth daily. 21   Sylvia Fu DO   doxazosin (CARDURA) 4 mg tablet Take 2 mg by mouth daily. Provider, Historical   sodium hypochlorite 0.0125% (DAKINS SOLUTION) Apply 1 mL to affected area two (2) times a week. Provider, Historical   omeprazole (PRILOSEC) 40 mg capsule Take 40 mg by mouth daily. Provider, Historical   sevelamer carbonate (RENVELA) 800 mg tab tab Take 800 mg by mouth three (3) times daily (with meals). Provider, Historical   sodium bicarbonate 650 mg tablet Take 650 mg by mouth three (3) times daily. Provider, Historical       Allergies   Allergen Reactions    Penicillins Rash    Vancomycin Other (comments)     kayla syndrome       Review of Systems  Gen: No fever, chills,  +malaise, weight loss/gain. Heent: No headache, rhinorrhea, epistaxis, ear pain, hearing loss, sinus pain, neck pain/stiffness, sore throat. Heart: No chest pain, palpitations, FU, pnd, or orthopnea. Resp: No cough, hemoptysis, wheezing and shortness of breath. GI: No nausea, vomiting, diarrhea, constipation, melena or hematochezia. : No urinary obstruction, dysuria or hematuria. Derm: No rash, new skin lesion or pruritis. Musc/skeletal: no bone or joint complains. Vasc: No edema, cyanosis or claudication. Endo: No heat/cold intolerance, no polyuria,polydipsia or polyphagia. Neuro: No unilateral weakness, numbness, tingling. No seizures. Heme: No easy bruising or bleeding. Physical Exam:     Physical Exam:  Visit Vitals  BP (!) 202/96   Pulse 90   Temp 97.6 °F (36.4 °C) (Oral)   Resp 18   Ht 5' 7\" (1.702 m)   Wt 89 kg (196 lb 3.4 oz)   SpO2 93%   BMI 30.73 kg/m²      O2 Device: Nasal cannula    Temp (24hrs), Av.6 °F (36.4 °C), Min:97.6 °F (36.4 °C), Max:97.6 °F (36.4 °C)    No intake/output data recorded.  0701 -  1900  In: 80 [I.V.:80]  Out: -     General:  Awake, cooperative, no distress. Head:  Normocephalic, without obvious abnormality, atraumatic. Eyes:  Conjunctivae/corneas clear, sclera anicteric, PERRL, EOMs intact.    Nose: Nares normal. No drainage or sinus tenderness. Throat: Lips, mucosa, and tongue normal. .   Neck: Supple, symmetrical, trachea midline, no adenopathy. Lungs:   Clear to auscultation bilaterally. Tdc noted        Heart:  Regular rate and rhythm, S1, S2 normal, no murmur, click, rub or gallop. Abdomen: Soft, non-tender. Bowel sounds normal. No masses,  No organomegaly. Extremities: Extremities normal, atraumatic, no cyanosis or edema. Rt BKA    Pulses: 2+ and symmetric all extremities. Skin: Skin color-pink, texture, turgor normal. No rashes or lesions. Capillary refill normal    Neurologic: CNII-XII intact. No focal motor or sensory deficit.        Labs Reviewed:    BMP:   Lab Results   Component Value Date/Time     08/26/2022 02:15 PM    K 4.1 08/26/2022 02:15 PM     08/26/2022 02:15 PM    CO2 21 08/26/2022 02:15 PM    AGAP 13 08/26/2022 02:15 PM    GLU 74 08/26/2022 02:15 PM    BUN 85 (H) 08/26/2022 02:15 PM    CREA 11.50 (H) 08/26/2022 02:15 PM    GFRAA 6 (L) 08/26/2022 02:15 PM    GFRNA 5 (L) 08/26/2022 02:15 PM     CMP:   Lab Results   Component Value Date/Time     08/26/2022 02:15 PM    K 4.1 08/26/2022 02:15 PM     08/26/2022 02:15 PM    CO2 21 08/26/2022 02:15 PM    AGAP 13 08/26/2022 02:15 PM    GLU 74 08/26/2022 02:15 PM    BUN 85 (H) 08/26/2022 02:15 PM    CREA 11.50 (H) 08/26/2022 02:15 PM    GFRAA 6 (L) 08/26/2022 02:15 PM    GFRNA 5 (L) 08/26/2022 02:15 PM    CA 8.9 08/26/2022 02:15 PM    ALB 2.9 (L) 08/26/2022 02:15 PM    TP 8.1 08/26/2022 02:15 PM    GLOB 5.2 (H) 08/26/2022 02:15 PM    AGRAT 0.6 (L) 08/26/2022 02:15 PM    ALT 31 08/26/2022 02:15 PM     CBC:   Lab Results   Component Value Date/Time    WBC 5.4 08/26/2022 02:15 PM    HGB 9.4 (L) 08/26/2022 02:15 PM    HCT 27.9 (L) 08/26/2022 02:15 PM     (L) 08/26/2022 02:15 PM     All Cardiac Markers in the last 24 hours: No results found for: CPK, CK, CKMMB, CKMB, RCK3, CKMBT, CKNDX, CKND1, WILLIE, TROPT, TROIQ, CHANDA, TROPT, TNIPOC, BNP, BNPP  Recent Glucose Results:   Lab Results   Component Value Date/Time    GLU 74 08/26/2022 02:15 PM     ABG: No results found for: PH, PHI, PCO2, PCO2I, PO2, PO2I, HCO3, HCO3I, FIO2, FIO2I  COAGS:   Lab Results   Component Value Date/Time    PTP 15.9 (H) 08/26/2022 02:15 PM    INR 1.2 08/26/2022 02:15 PM     Liver Panel:   Lab Results   Component Value Date/Time    ALB 2.9 (L) 08/26/2022 02:15 PM    TP 8.1 08/26/2022 02:15 PM    GLOB 5.2 (H) 08/26/2022 02:15 PM    AGRAT 0.6 (L) 08/26/2022 02:15 PM    ALT 31 08/26/2022 02:15 PM     08/26/2022 02:15 PM       XR CHEST PORT    Result Date: 8/26/2022  EXAM: XR CHEST PORT CLINICAL INDICATION/HISTORY: cp -Additional: None COMPARISON: 6/20/2022 TECHNIQUE: Frontal view of the chest _______________ FINDINGS: HEART AND MEDIASTINUM: Right IJV dialysis catheter tip at the SVC. Cardiomegaly. LUNGS AND PLEURAL SPACES: Bilateral parenchymal and interstitial opacities. No pneumothorax. BONY THORAX AND SOFT TISSUES: No acute osseous abnormality _______________     Cardiomegaly and pulmonary edema.     Procedures/imaging: see electronic medical records for all procedures/Xrays and details which were not copied into this note but were reviewed prior to creation of Octavia Ojeda MD, Internal Medicine     CC: Emelina Betancourt MD

## 2022-08-27 PROBLEM — E87.70 VOLUME OVERLOAD: Status: RESOLVED | Noted: 2022-06-20 | Resolved: 2022-08-27

## 2022-08-27 PROBLEM — N19 UREMIA: Status: RESOLVED | Noted: 2021-08-27 | Resolved: 2022-08-27

## 2022-08-27 PROBLEM — D64.9 CHRONIC ANEMIA: Status: ACTIVE | Noted: 2022-08-27

## 2022-08-27 PROBLEM — A41.9 SEPSIS (HCC): Status: RESOLVED | Noted: 2022-06-22 | Resolved: 2022-08-27

## 2022-08-27 PROBLEM — I42.5 OTHER RESTRICTIVE CARDIOMYOPATHY (HCC): Status: RESOLVED | Noted: 2018-03-04 | Resolved: 2022-08-27

## 2022-08-27 PROBLEM — J81.0 ACUTE PULMONARY EDEMA (HCC): Status: ACTIVE | Noted: 2022-06-20

## 2022-08-27 PROBLEM — Z86.16 HISTORY OF COVID-19: Status: ACTIVE | Noted: 2022-08-27

## 2022-08-27 PROBLEM — U07.1 COVID-19: Status: RESOLVED | Noted: 2022-06-20 | Resolved: 2022-08-27

## 2022-08-27 PROBLEM — R91.8 PULMONARY INFILTRATES: Status: ACTIVE | Noted: 2022-08-27

## 2022-08-27 PROBLEM — I51.7 CARDIOMEGALY: Status: ACTIVE | Noted: 2022-08-27

## 2022-08-27 PROBLEM — R78.81 BACTEREMIA: Status: RESOLVED | Noted: 2022-06-22 | Resolved: 2022-08-27

## 2022-08-27 LAB
ANION GAP SERPL CALC-SCNC: 7 MMOL/L (ref 3–18)
BASOPHILS # BLD: 0 K/UL (ref 0–0.1)
BASOPHILS NFR BLD: 1 % (ref 0–2)
BUN SERPL-MCNC: 36 MG/DL (ref 7–18)
BUN/CREAT SERPL: 6 (ref 12–20)
CALCIUM SERPL-MCNC: 8.6 MG/DL (ref 8.5–10.1)
CHLORIDE SERPL-SCNC: 107 MMOL/L (ref 100–111)
CO2 SERPL-SCNC: 26 MMOL/L (ref 21–32)
COVID-19 RAPID TEST, COVR: NOT DETECTED
CREAT SERPL-MCNC: 6.17 MG/DL (ref 0.6–1.3)
DIFFERENTIAL METHOD BLD: ABNORMAL
EOSINOPHIL # BLD: 0.3 K/UL (ref 0–0.4)
EOSINOPHIL NFR BLD: 7 % (ref 0–5)
ERYTHROCYTE [DISTWIDTH] IN BLOOD BY AUTOMATED COUNT: 19.7 % (ref 11.6–14.5)
GLUCOSE BLD STRIP.AUTO-MCNC: 114 MG/DL (ref 70–110)
GLUCOSE BLD STRIP.AUTO-MCNC: 83 MG/DL (ref 70–110)
GLUCOSE SERPL-MCNC: 78 MG/DL (ref 74–99)
HCT VFR BLD AUTO: 29.1 % (ref 36–48)
HGB BLD-MCNC: 9.6 G/DL (ref 13–16)
IMM GRANULOCYTES # BLD AUTO: 0 K/UL (ref 0–0.04)
IMM GRANULOCYTES NFR BLD AUTO: 0 % (ref 0–0.5)
LYMPHOCYTES # BLD: 0.5 K/UL (ref 0.9–3.6)
LYMPHOCYTES NFR BLD: 11 % (ref 21–52)
MAGNESIUM SERPL-MCNC: 2.7 MG/DL (ref 1.6–2.6)
MCH RBC QN AUTO: 26.7 PG (ref 24–34)
MCHC RBC AUTO-ENTMCNC: 33 G/DL (ref 31–37)
MCV RBC AUTO: 81.1 FL (ref 78–100)
MONOCYTES # BLD: 0.4 K/UL (ref 0.05–1.2)
MONOCYTES NFR BLD: 9 % (ref 3–10)
NEUTS SEG # BLD: 3.2 K/UL (ref 1.8–8)
NEUTS SEG NFR BLD: 72 % (ref 40–73)
NRBC # BLD: 0 K/UL (ref 0–0.01)
NRBC BLD-RTO: 0 PER 100 WBC
PLATELET # BLD AUTO: 153 K/UL (ref 135–420)
POTASSIUM SERPL-SCNC: 3.4 MMOL/L (ref 3.5–5.5)
PROCALCITONIN SERPL-MCNC: 4.02 NG/ML
RBC # BLD AUTO: 3.59 M/UL (ref 4.35–5.65)
SODIUM SERPL-SCNC: 140 MMOL/L (ref 136–145)
SOURCE, COVRS: NORMAL
TROPONIN-HIGH SENSITIVITY: 54 NG/L (ref 0–78)
WBC # BLD AUTO: 4.4 K/UL (ref 4.6–13.2)

## 2022-08-27 PROCEDURE — 74011250637 HC RX REV CODE- 250/637: Performed by: STUDENT IN AN ORGANIZED HEALTH CARE EDUCATION/TRAINING PROGRAM

## 2022-08-27 PROCEDURE — 83735 ASSAY OF MAGNESIUM: CPT

## 2022-08-27 PROCEDURE — 74011000250 HC RX REV CODE- 250: Performed by: EMERGENCY MEDICINE

## 2022-08-27 PROCEDURE — 74011250636 HC RX REV CODE- 250/636: Performed by: INTERNAL MEDICINE

## 2022-08-27 PROCEDURE — 74011250636 HC RX REV CODE- 250/636: Performed by: HOSPITALIST

## 2022-08-27 PROCEDURE — 74011000258 HC RX REV CODE- 258: Performed by: INTERNAL MEDICINE

## 2022-08-27 PROCEDURE — 80048 BASIC METABOLIC PNL TOTAL CA: CPT

## 2022-08-27 PROCEDURE — 77010033678 HC OXYGEN DAILY

## 2022-08-27 PROCEDURE — 74011000258 HC RX REV CODE- 258: Performed by: EMERGENCY MEDICINE

## 2022-08-27 PROCEDURE — 65610000006 HC RM INTENSIVE CARE

## 2022-08-27 PROCEDURE — 84145 PROCALCITONIN (PCT): CPT

## 2022-08-27 PROCEDURE — 74011000250 HC RX REV CODE- 250: Performed by: HOSPITALIST

## 2022-08-27 PROCEDURE — 87040 BLOOD CULTURE FOR BACTERIA: CPT

## 2022-08-27 PROCEDURE — 36415 COLL VENOUS BLD VENIPUNCTURE: CPT

## 2022-08-27 PROCEDURE — 74011250637 HC RX REV CODE- 250/637: Performed by: INTERNAL MEDICINE

## 2022-08-27 PROCEDURE — 85025 COMPLETE CBC W/AUTO DIFF WBC: CPT

## 2022-08-27 PROCEDURE — 74011250637 HC RX REV CODE- 250/637: Performed by: HOSPITALIST

## 2022-08-27 PROCEDURE — 82962 GLUCOSE BLOOD TEST: CPT

## 2022-08-27 PROCEDURE — 84484 ASSAY OF TROPONIN QUANT: CPT

## 2022-08-27 PROCEDURE — 87635 SARS-COV-2 COVID-19 AMP PRB: CPT

## 2022-08-27 RX ORDER — POTASSIUM CHLORIDE 7.45 MG/ML
10 INJECTION INTRAVENOUS ONCE
Status: COMPLETED | OUTPATIENT
Start: 2022-08-27 | End: 2022-08-27

## 2022-08-27 RX ORDER — DIPHENHYDRAMINE HYDROCHLORIDE 50 MG/ML
12.5 INJECTION, SOLUTION INTRAMUSCULAR; INTRAVENOUS
Status: DISCONTINUED | OUTPATIENT
Start: 2022-08-27 | End: 2022-08-31 | Stop reason: HOSPADM

## 2022-08-27 RX ORDER — LINEZOLID 2 MG/ML
600 INJECTION, SOLUTION INTRAVENOUS EVERY 12 HOURS
Status: DISCONTINUED | OUTPATIENT
Start: 2022-08-27 | End: 2022-08-29

## 2022-08-27 RX ADMIN — PROMETHAZINE HYDROCHLORIDE 12.5 MG: 25 TABLET ORAL at 21:55

## 2022-08-27 RX ADMIN — SODIUM CHLORIDE, PRESERVATIVE FREE 10 ML: 5 INJECTION INTRAVENOUS at 15:44

## 2022-08-27 RX ADMIN — HEPARIN SODIUM 5000 UNITS: 5000 INJECTION INTRAVENOUS; SUBCUTANEOUS at 09:45

## 2022-08-27 RX ADMIN — ASPIRIN 81 MG: 81 TABLET, COATED ORAL at 09:45

## 2022-08-27 RX ADMIN — MEROPENEM 500 MG: 500 INJECTION, POWDER, FOR SOLUTION INTRAVENOUS at 12:21

## 2022-08-27 RX ADMIN — SEVELAMER CARBONATE 1600 MG: 800 TABLET, FILM COATED ORAL at 18:56

## 2022-08-27 RX ADMIN — SODIUM CHLORIDE 5 MG/HR: 9 INJECTION, SOLUTION INTRAVENOUS at 18:56

## 2022-08-27 RX ADMIN — HEPARIN SODIUM 5000 UNITS: 5000 INJECTION INTRAVENOUS; SUBCUTANEOUS at 01:54

## 2022-08-27 RX ADMIN — AMLODIPINE BESYLATE 10 MG: 5 TABLET ORAL at 09:45

## 2022-08-27 RX ADMIN — SEVELAMER CARBONATE 1600 MG: 800 TABLET, FILM COATED ORAL at 12:21

## 2022-08-27 RX ADMIN — MEROPENEM 500 MG: 500 INJECTION, POWDER, FOR SOLUTION INTRAVENOUS at 21:26

## 2022-08-27 RX ADMIN — SODIUM CHLORIDE, PRESERVATIVE FREE 10 ML: 5 INJECTION INTRAVENOUS at 21:27

## 2022-08-27 RX ADMIN — ACETAMINOPHEN 650 MG: 325 TABLET ORAL at 21:55

## 2022-08-27 RX ADMIN — POTASSIUM BICARBONATE 20 MEQ: 782 TABLET, EFFERVESCENT ORAL at 06:31

## 2022-08-27 RX ADMIN — ACETAMINOPHEN 650 MG: 325 TABLET ORAL at 15:43

## 2022-08-27 RX ADMIN — PANTOPRAZOLE SODIUM 40 MG: 40 TABLET, DELAYED RELEASE ORAL at 06:31

## 2022-08-27 RX ADMIN — LOSARTAN POTASSIUM 100 MG: 50 TABLET, FILM COATED ORAL at 09:45

## 2022-08-27 RX ADMIN — POTASSIUM CHLORIDE 10 MEQ: 7.46 INJECTION, SOLUTION INTRAVENOUS at 06:31

## 2022-08-27 RX ADMIN — DIPHENHYDRAMINE HYDROCHLORIDE 12.5 MG: 50 INJECTION, SOLUTION INTRAMUSCULAR; INTRAVENOUS at 01:54

## 2022-08-27 RX ADMIN — LINEZOLID 600 MG: 600 INJECTION, SOLUTION INTRAVENOUS at 21:26

## 2022-08-27 RX ADMIN — LINEZOLID 600 MG: 600 INJECTION, SOLUTION INTRAVENOUS at 12:21

## 2022-08-27 RX ADMIN — HEPARIN SODIUM 5000 UNITS: 5000 INJECTION INTRAVENOUS; SUBCUTANEOUS at 18:56

## 2022-08-27 RX ADMIN — ISOSORBIDE MONONITRATE 60 MG: 60 TABLET, EXTENDED RELEASE ORAL at 09:45

## 2022-08-27 RX ADMIN — PROMETHAZINE HYDROCHLORIDE 12.5 MG: 25 TABLET ORAL at 03:57

## 2022-08-27 RX ADMIN — SODIUM CHLORIDE 5 MG/HR: 9 INJECTION, SOLUTION INTRAVENOUS at 05:06

## 2022-08-27 NOTE — CONSULTS
[facility]  Pulmonary, Critical Care, and Sleep Medicine    Name: aMria Khan MRN: [id number]   : 1970 Hospital: [facility] [place]    Date: 2022  Room: [facility]/[address] [place] [address] Note                                              Consult requesting physician: Dr. Niall Charles  Reason for Consult: HTN Urgency, ESRD on dialysis      IMPRESSION:   Hypertension emergency  End-stage renal disease on hemodialysis  Cardiomegaly  Acute pulmonary edema  Bilateral pulmonary infiltrates  Chronic anemia  History of MSSA bacteremia/AV graft infection 2022      Patient Active Problem List   Diagnosis Code    Iron deficiency anemia D50.9    ESRD (end stage renal disease) on dialysis ([address] N18.6, Z99.2    HTN (hypertension) I10    Hx of BKA, right ([address] Z89.511    Non-compliance with renal dialysis ([address] Z91.15    Hypertension I10    Acute pulmonary edema ([address] J81.0    Arteriovenous graft infection ([facility] [address] T82. 7XXA    Hypoxia R09.02    Hypertensive emergency I16.1    Cardiomegaly I51.7    Pulmonary infiltrates R91.8    History of COVID-19 Z86.16    Chronic anemia D64.9       Code status: Full Code      RECOMMENDATIONS:   Respiratory: Respirations; currently on nasal cannula oxygen at 2 L. Chest x-ray on admission shows cardiomegaly with bilateral pulm edema versus pulm infiltrates. Keep SPO2 >=92%. HOB 30 degree elevation all the time. Aggressive pulmonary toileting. Aspiration precautions. Incentive spirometry. CVS: Currently on nicardipine drip. Oral antihypertensives-amlodipine, losartan isosorbide mononitrate. History of suspected mitral valve vegetation; check blood cultures; repeat echocardiogram.  ID: Bilateral pulm infiltrates-could be pneumonia versus edema  History of COVID infection in 2022. Check procalcitonin; check rapid COVID test.  Patient allergic to penicillin and IV vancomycin. Empiric antibiotic therapy-Merrem and linezolid.   Follow cultures, and deescalate antibiotic when appropriate. Hematology/Oncology: Monitor hemoglobin and platelets. Chronic anemia; no active bleeding issues. Renal: Dialysis per nephrology. Monitor renal function. GI: PPI Prophylaxis. Endocrine: Monitor BS. SSI if needed. Neurology: Stable mentation. Nutrition: Oral diet. Prophylaxis: DVT Prophylaxis: Sc Heparin. GI Prophylaxis: Protonix. Lines/Tubes: PIV  Right chest wall dialysis catheter. Quality Care: PPI, DVT prophylaxis, HOB elevated, Infection control all reviewed and addressed. Care of plan d/w RN. D/w patient and updated management plans from ICU perspective (answered all questions to satisfaction). High complexity decision making was performed during the evaluation of this patient at high risk for decompensation with multiple organ involvement. Total critical care time spent rendering care exclusive of procedures/family discussion/coordination of care: 44 minutes. Subjective/History of Present Illness:     Patient is a 50 y.o. male with PMHx significant for end-stage renal disease on hemodialysis, hypertension, medical noncompliance. The patient had missed dialysis for few days, and presented to the dialysis center yesterday. He had significantly elevated blood pressure, and was sent to the ER. In the ER, his blood pressure was 233/109 mmHg on arrival, and was in pulmonary edema. Patient needed antihypertensives, and subsequently placed on nicardipine drip, and transferred to the ICU. ID note reviewed from June 2022 admission. MSSA high grade and persistent bacteremia-- 6/20 and 6/21-- suspect TDC/AVG source. L arm AVG is opened up with drainage  --[facility] removed--6/22- cath tip MSSA   --AVG drain culture 6/22- S.aurues  Possible IE on MV- on TTE 6/22  COVID 19 Infection-- doubt viral PNA  --fully vaccinared and boosted 11/2021  - Patient was treated with cefazolin antibiotic      8/27/2022  Patient seen in ICU. He is awake and alert.   Telemetry-sinus rhythm  Blood pressure-seems better controlled. No chest pain or palpitations. No cough or shortness of breath. No vomiting or diarrhea. Drips-Nicardipine currently 5 mg/h. Review of Systems:   HEENT: No epistaxis, no nasal drainage, no difficulty in swallowing  Respiratory: as above  Cardiovascular: no chest pain, no palpitations, no chronic leg edema  Gastrointestinal: no abd pain, no vomiting, no diarrhea, no bleeding symptoms  Genitourinary: No urinary symptoms or hematuria  Musculoskeletal: RT BKA  Neurological: No focal weakness, no seizures, no headaches  Behvioral/Psych: No anxiety, no depression  Constitutional: No fever, no chills      Allergies   Allergen Reactions    Penicillins Rash    Vancomycin Other (comments)     kayla syndrome      Past Medical History:   Diagnosis Date    Chronic kidney disease     Diabetes [address]     Heart failure ([facility]     Hemodialysis patient ([facility]     Hypertension     Sickle cell trait ([facility]       Past Surgical History:   Procedure Laterality Date    HX ORTHOPAEDIC      right BKA 2017    IR DECLOT AV GRAFT PERCUTANEOUS  6/3/2022    IR INSERT NON TUNL CVC OVER 5 YRS  6/1/2022      Social History     Tobacco Use    Smoking status: Former    Smokeless tobacco: Never   Substance Use Topics    Alcohol use: No      History reviewed. No pertinent family history. Prior to Admission medications    Medication Sig Start Date End Date Taking? Authorizing Provider   calcitRIOL (ROCALTROL) 0.25 mcg capsule Take 1 Capsule by mouth every Monday, Wednesday, Friday. 7/6/22   Chato Arambula MD   epoetin delonte-epbx (RETACRIT) 20,000 unit/mL injection 1 mL by SubCUTAneous route every Monday, Wednesday, Friday. Indications: anemia due to kidney failure 7/6/22   Chato Arambula MD   isosorbide mononitrate ER (IMDUR) 60 mg CR tablet Take 1 Tablet by mouth daily. 6/7/22   Danilo Alanis MD   cyanocobalamin 1,000 mcg tablet Take 1 Tablet by mouth daily.  6/7/22   Danilo Alanis MD aspirin delayed-release 81 mg tablet Take 1 Tablet by mouth daily. 9/7/21   Kailey Boston DO   doxazosin (CARDURA) 4 mg tablet Take 2 mg by mouth daily. Provider, Historical   sodium hypochlorite 0.0125% (DAKINS SOLUTION) Apply 1 mL to affected area two (2) times a week. Provider, Historical   omeprazole (PRILOSEC) 40 mg capsule Take 40 mg by mouth daily. Provider, Historical   sevelamer carbonate (RENVELA) 800 mg tab tab Take 800 mg by mouth three (3) times daily (with meals). Provider, Historical   sodium bicarbonate 650 mg tablet Take 650 mg by mouth three (3) times daily.   Patient not taking: Reported on 8/26/2022    Provider, Historical     Current Facility-Administered Medications   Medication Dose Route Frequency    meropenem (MERREM) 500 mg in 0.9% sodium chloride (MBP/ADV) 50 mL MBP  0.5 g IntraVENous Q12H    linezolid in dextrose 5% (ZYVOX) IVPB premix in D5W 600 mg  600 mg IntraVENous Q12H    niCARdipine (CARDENE) 25 mg in 0.9% sodium chloride 250 mL infusion  5-15 mg/hr IntraVENous TITRATE    sevelamer carbonate (RENVELA) tab 1,600 mg  1,600 mg Oral TID WITH MEALS    doxercalciferoL (HECTOROL) 4 mcg/2 mL injection 4 mcg  4 mcg IntraVENous DIALYSIS MON, WED & FRI    aspirin delayed-release tablet 81 mg  81 mg Oral DAILY    isosorbide mononitrate ER (IMDUR) tablet 60 mg  60 mg Oral DAILY    calcitRIOL (ROCALTROL) capsule 0.25 mcg  0.25 mcg Oral Q MON, WED & FRI    sodium bicarbonate tablet 650 mg  650 mg Oral TID    sodium chloride (NS) flush 5-40 mL  5-40 mL IntraVENous Q8H    heparin (porcine) injection 5,000 Units  5,000 Units SubCUTAneous Q8H    amLODIPine (NORVASC) tablet 10 mg  10 mg Oral DAILY    losartan (COZAAR) tablet 100 mg  100 mg Oral DAILY    pantoprazole (PROTONIX) tablet 40 mg  40 mg Oral ACB    heparin (porcine) 1,000 unit/mL injection 3,200 Units  3,200 Units Hemodialysis DIALYSIS CONTINUOUS         Objective:   Vital Signs:    Visit Vitals  BP (!) 153/66   Pulse 82   Temp 98.6 °F (37 °C)   Resp 20   Ht 5' 7\" (1.702 m)   Wt 77.9 kg (171 lb 11.8 oz)   SpO2 100%   BMI 26.90 kg/m²       O2 Device: Nasal cannula   O2 Flow Rate (L/min): 2 l/min   Temp (24hrs), Av °F (36.7 °C), Min:97.6 °F (36.4 °C), Max:98.6 °F (37 °C)       Intake/Output:   Last shift:      No intake/output data recorded.     Last 3 shifts:  190 -  0700  In: 480.4 [I.V.:480.4]  Out: 3500       Intake/Output Summary (Last 24 hours) at 2022 0943  Last data filed at 2022 0400  Gross per 24 hour   Intake 480.42 ml   Output 3500 ml   Net -3019.58 ml       Last 3 Recorded Weights in this Encounter    22 1354 22 2210   Weight: 89 kg (196 lb 3.4 oz) 77.9 kg (171 lb 11.8 oz)       Physical Exam:   Comfortable; on 2 lits nc; acyanotic  HEENT: pupils not dilated, reactive, no scleral jaundice, moist oral mucosa, no nasal drainag  Neck: No adenopathy or thyroid swelling  CVS: S1S2 no murmurs; JVD not elevated; telemetry-sinus rhythm  RS: Mod air entry bilaterally, decreased BS at bases, no wheezes, scattered bilateral crackles, not tachypneic or in distress  Abd: soft, non tender, no hepatosplenomegaly, no abd distension, no guarding or rigidity, bowel sounds heard  Neuro: non focal, awake, alert  Extrm: no leg edema or swelling or clubbing  Skin: no rash  Lymphatic: no cervical or supraclavicular adenopathy  Psych: normal mood  Vasc: Right chest wall dialysis catheter  MS: Right below-knee amputation      Data:       Recent Results (from the past 24 hour(s))   EKG, 12 LEAD, INITIAL    Collection Time: 22  1:52 PM   Result Value Ref Range    Ventricular Rate 85 BPM    Atrial Rate 85 BPM    P-R Interval 152 ms    QRS Duration 88 ms    Q-T Interval 418 ms    QTC Calculation (Bezet) 497 ms    Calculated P Axis 78 degrees    Calculated R Axis -10 degrees    Calculated T Axis 112 degrees    Diagnosis       Normal sinus rhythm  T wave abnormality, consider lateral ischemia  Abnormal ECG  When compared with ECG of 20-JUN-2022 10:17,  No significant change was found     CBC WITH AUTOMATED DIFF    Collection Time: 08/26/22  2:15 PM   Result Value Ref Range    WBC 5.4 4.6 - 13.2 K/uL    RBC 3.45 (L) 4.35 - 5.65 M/uL    HGB 9.4 (L) 13.0 - 16.0 g/dL    HCT 27.9 (L) 36.0 - 48.0 %    MCV 80.9 78.0 - 100.0 FL    MCH 27.2 24.0 - 34.0 PG    MCHC 33.7 31.0 - 37.0 g/dL    RDW 19.4 (H) 11.6 - 14.5 %    PLATELET 138 (L) 577 - 420 K/uL    NRBC 0.0 0  WBC    ABSOLUTE NRBC 0.00 0.00 - 0.01 K/uL    NEUTROPHILS 75 (H) 40 - 73 %    LYMPHOCYTES 9 (L) 21 - 52 %    MONOCYTES 9 3 - 10 %    EOSINOPHILS 5 0 - 5 %    BASOPHILS 0 0 - 2 %    IMMATURE GRANULOCYTES 0 0.0 - 0.5 %    ABS. NEUTROPHILS 4.1 1.8 - 8.0 K/UL    ABS. LYMPHOCYTES 0.5 (L) 0.9 - 3.6 K/UL    ABS. MONOCYTES 0.5 0.05 - 1.2 K/UL    ABS. EOSINOPHILS 0.3 0.0 - 0.4 K/UL    ABS. BASOPHILS 0.0 0.0 - 0.1 K/UL    ABS. IMM. GRANS. 0.0 0.00 - 0.04 K/UL    DF AUTOMATED     METABOLIC PANEL, COMPREHENSIVE    Collection Time: 08/26/22  2:15 PM   Result Value Ref Range    Sodium 136 136 - 145 mmol/L    Potassium 4.1 3.5 - 5.5 mmol/L    Chloride 102 100 - 111 mmol/L    CO2 21 21 - 32 mmol/L    Anion gap 13 3.0 - 18 mmol/L    Glucose 74 74 - 99 mg/dL    BUN 85 (H) 7.0 - 18 MG/DL    Creatinine 11.50 (H) 0.6 - 1.3 MG/DL    BUN/Creatinine ratio 7 (L) 12 - 20      GFR est AA 6 (L) >60 ml/min/1.73m2    GFR est non-AA 5 (L) >60 ml/min/1.73m2    Calcium 8.9 8.5 - 10.1 MG/DL    Bilirubin, total 0.7 0.2 - 1.0 MG/DL    ALT (SGPT) 31 16 - 61 U/L    AST (SGOT) 17 10 - 38 U/L    Alk.  phosphatase 102 45 - 117 U/L    Protein, total 8.1 6.4 - 8.2 g/dL    Albumin 2.9 (L) 3.4 - 5.0 g/dL    Globulin 5.2 (H) 2.0 - 4.0 g/dL    A-G Ratio 0.6 (L) 0.8 - 1.7     TROPONIN-HIGH SENSITIVITY    Collection Time: 08/26/22  2:15 PM   Result Value Ref Range    Troponin-High Sensitivity 44 0 - 78 ng/L   PROTHROMBIN TIME + INR    Collection Time: 08/26/22  2:15 PM   Result Value Ref Range    Prothrombin time 15.9 (H) 11.5 - 15.2 sec    INR 1.2 0.8 - 1.2     LIPASE    Collection Time: 08/26/22  2:15 PM   Result Value Ref Range    Lipase 103 73 - 393 U/L   GLUCOSE, POC    Collection Time: 08/26/22  9:09 PM   Result Value Ref Range    Glucose (POC) 121 (H) 70 - 803 mg/dL   METABOLIC PANEL, BASIC    Collection Time: 08/27/22  4:17 AM   Result Value Ref Range    Sodium 140 136 - 145 mmol/L    Potassium 3.4 (L) 3.5 - 5.5 mmol/L    Chloride 107 100 - 111 mmol/L    CO2 26 21 - 32 mmol/L    Anion gap 7 3.0 - 18 mmol/L    Glucose 78 74 - 99 mg/dL    BUN 36 (H) 7.0 - 18 MG/DL    Creatinine 6.17 (H) 0.6 - 1.3 MG/DL    BUN/Creatinine ratio 6 (L) 12 - 20      GFR est AA 12 (L) >60 ml/min/1.73m2    GFR est non-AA 10 (L) >60 ml/min/1.73m2    Calcium 8.6 8.5 - 10.1 MG/DL   MAGNESIUM    Collection Time: 08/27/22  4:17 AM   Result Value Ref Range    Magnesium 2.7 (H) 1.6 - 2.6 mg/dL   CBC WITH AUTOMATED DIFF    Collection Time: 08/27/22  4:17 AM   Result Value Ref Range    WBC 4.4 (L) 4.6 - 13.2 K/uL    RBC 3.59 (L) 4.35 - 5.65 M/uL    HGB 9.6 (L) 13.0 - 16.0 g/dL    HCT 29.1 (L) 36.0 - 48.0 %    MCV 81.1 78.0 - 100.0 FL    MCH 26.7 24.0 - 34.0 PG    MCHC 33.0 31.0 - 37.0 g/dL    RDW 19.7 (H) 11.6 - 14.5 %    PLATELET 614 541 - 194 K/uL    NRBC 0.0 0  WBC    ABSOLUTE NRBC 0.00 0.00 - 0.01 K/uL    NEUTROPHILS 72 40 - 73 %    LYMPHOCYTES 11 (L) 21 - 52 %    MONOCYTES 9 3 - 10 %    EOSINOPHILS 7 (H) 0 - 5 %    BASOPHILS 1 0 - 2 %    IMMATURE GRANULOCYTES 0 0.0 - 0.5 %    ABS. NEUTROPHILS 3.2 1.8 - 8.0 K/UL    ABS. LYMPHOCYTES 0.5 (L) 0.9 - 3.6 K/UL    ABS. MONOCYTES 0.4 0.05 - 1.2 K/UL    ABS. EOSINOPHILS 0.3 0.0 - 0.4 K/UL    ABS. BASOPHILS 0.0 0.0 - 0.1 K/UL    ABS. IMM.  GRANS. 0.0 0.00 - 0.04 K/UL    DF AUTOMATED           Chemistry Recent Labs     08/27/22  0417 08/26/22  1415   GLU 78 74    136   K 3.4* 4.1    102   CO2 26 21   BUN 36* 85*   CREA 6.17* 11.50*   CA 8.6 8.9   MG 2.7*  --    AGAP 7 13   BUCR 6* 7*   AP --  102   TP  --  8.1   ALB  --  2.9*   GLOB  --  5.2*   AGRAT  --  0.6*        Lactic Acid Lactic acid   Date Value Ref Range Status   06/29/2022 1.9 0.4 - 2.0 MMOL/L Final     No results for input(s): LAC in the last 72 hours. Liver Enzymes Protein, total   Date Value Ref Range Status   08/26/2022 8.1 6.4 - 8.2 g/dL Final     Albumin   Date Value Ref Range Status   08/26/2022 2.9 (L) 3.4 - 5.0 g/dL Final     Globulin   Date Value Ref Range Status   08/26/2022 5.2 (H) 2.0 - 4.0 g/dL Final     A-G Ratio   Date Value Ref Range Status   08/26/2022 0.6 (L) 0.8 - 1.7   Final     Alk.  phosphatase   Date Value Ref Range Status   08/26/2022 102 45 - 117 U/L Final     Recent Labs     08/26/22  1415   TP 8.1   ALB 2.9*   GLOB 5.2*   AGRAT 0.6*           CBC w/Diff Recent Labs     08/27/22  0417 08/26/22  1415   WBC 4.4* 5.4   RBC 3.59* 3.45*   HGB 9.6* 9.4*   HCT 29.1* 27.9*    124*   GRANS 72 75*   LYMPH 11* 9*   EOS 7* 5        Cardiac Enzymes No results found for: CPK, CK, CKMMB, CKMB, RCK3, CKMBT, CKNDX, CKND1, WILLIE, TROPT, TROIQ, CHANDA, TROPT, TNIPOC, BNP, BNPP     BNP No results found for: BNP, BNPP, XBNPT     Coagulation Recent Labs     08/26/22  1415   PTP 15.9*   INR 1.2         Thyroid  Lab Results   Component Value Date/Time    TSH 2.38 08/29/2021 01:20 AM       No results found for: T4     Urinalysis Lab Results   Component Value Date/Time    Color YELLOW 03/02/2018 05:41 AM    Appearance CLEAR 03/02/2018 05:41 AM    Specific gravity 1.010 03/02/2018 05:41 AM    pH (UA) 5.0 03/02/2018 05:41 AM    Protein 300 (A) 03/02/2018 05:41 AM    Glucose NEGATIVE  03/02/2018 05:41 AM    Ketone NEGATIVE  03/02/2018 05:41 AM    Bilirubin NEGATIVE  03/02/2018 05:41 AM    Urobilinogen 0.2 03/02/2018 05:41 AM    Nitrites NEGATIVE  03/02/2018 05:41 AM    Leukocyte Esterase NEGATIVE  03/02/2018 05:41 AM    Epithelial cells FEW 03/02/2018 05:41 AM    Bacteria FEW (A) 03/02/2018 05:41 AM    WBC 0 to 2 03/02/2018 05:41 AM    RBC NEGATIVE  03/02/2018 05:41 AM        Micro  No results for input(s): SDES, CULT in the last 72 hours. No results for input(s): CULT in the last 72 hours. Culture data during this hospitalization. All Micro Results       Procedure Component Value Units Date/Time    COVID-19 RAPID TEST [[id number]]     Order Status: Sent              ECHO 6/22/2022 Interpretation Summary  Result status: Final result     Left Ventricle: Normal left ventricular systolic function with a visually estimated EF of 55 - 60%. Left ventricle size is normal. Moderately increased wall thickness. Normal wall motion. Abnormal diastolic function. Aortic Valve: Tricuspid valve. Mildly calcified cusp. Left Atrium: Left atrium is mildly dilated. Pericardium: Small (<1 cm) pericardial effusion present. No indication of cardiac tamponade. Unable to assess RVSP due to inadequate or insignificant tricuspid regurgitation. Mitral annular calcification and calcification of the posterior mitral leaflet. There is a mobile probably calcified nodule on the posterior leaflet of the mitral valve. Vegetation cannot be ruled out on this transthoracic study. Consider BRENDEN for further evaluation. Images report reviewed by me:  CT 6/1/2022 (Most Recent)  Results from Hospital Encounter encounter on 06/01/22    CT HEAD WO CONT    Narrative  EXAM: CT head    INDICATION: Confusion. COMPARISON: 8/29/2021    TECHNIQUE: Axial CT imaging of the head was performed without intravenous  contrast. Standard multiplanar coronal and sagittal reformatted images were  obtained and are included in interpretation. One or more dose reduction techniques were used on this CT: automated exposure  control, adjustment of the mAs and/or kVp according to patient size, and  iterative reconstruction techniques. The specific techniques used on this CT  exam have been documented in the patient's electronic medical record.   Digital  Imaging and Communications in Medicine (DICOM) format image data are available  to nonaffiliated external healthcare facilities or entities on a secure, media  free, reciprocally searchable basis with patient authorization for at least a  12-month period after this study. _______________    FINDINGS:    BRAIN AND POSTERIOR FOSSA: Right central arden hypodensity, new from prior study. No evidence of acute large vessel transcortical infarct or acute parenchymal  hemorrhage. No midline shift or hydrocephalus. EXTRA-AXIAL SPACES AND MENINGES: There are no abnormal extra-axial fluid  collections. CALVARIUM: Intact. SINUSES: Clear. OTHER: None.    _______________    Impression  Right central arden hypodensity, new from prior study, age indeterminate infarct. Correlate clinically. CXR reviewed by me:  XR 8/26 (Most Recent). Results from Hospital Encounter encounter on 08/26/22    XR CHEST PORT    Narrative  EXAM: XR CHEST PORT    CLINICAL INDICATION/HISTORY: cp  -Additional: None    COMPARISON: 6/20/2022    TECHNIQUE: Frontal view of the chest    _______________    FINDINGS:    HEART AND MEDIASTINUM: Right IJV dialysis catheter tip at the SVC. Cardiomegaly. LUNGS AND PLEURAL SPACES: Bilateral parenchymal and interstitial opacities. No  pneumothorax. BONY THORAX AND SOFT TISSUES: No acute osseous abnormality    _______________    Impression  Cardiomegaly and pulmonary edema. Please note: Voice-recognition software may have been used to generate this report, which may have resulted in some phonetic-based errors in grammar and contents. Even though attempts were made to correct all the mistakes, some may have been missed, and remained in the body of the document.       Ayesha Ferrari MD  8/27/2022

## 2022-08-27 NOTE — ED NOTES
Report called to Kye Yao RN in ICU. Pt has 45 mins left on HDL then RN will transfer pt to ICU. Cardene titrated up due to BP still elevated.

## 2022-08-27 NOTE — ED NOTES
Pt called out requesting for  \"crackers bc his sugar is dropping\". BG checked and pt . Pt provided with crackers.

## 2022-08-27 NOTE — PROGRESS NOTES
Assessment:     Chitra Rodrigues is a 50y.o. year old maleh/o ESRD on HD , non compliant with dialysis /skip multiple dialysis session every month despite knowing the risk was sent from dialysis unit because of very high blood pressure . BP in ED was 233/109   CXR showed pulmonary edema         Hypertensive urgency   Pulmonary edema  ESRD  Non compliance  Hyperphosphatemia     Plan:  No neuro symptoms from HTN so could have lower BP goal. Titrate SBP to 150-160 range. Next HD on Monday        CC: Hypertensive emergencies        Subjective:   PT does not have any somatic complaints        Review of Systems  Negative for  SOB,  Nausea, vomiting        Blood pressure (!) 171/80, pulse 85, temperature 98.6 °F (37 °C), resp. rate 17, height 5' 7\" (1.702 m), weight 77.9 kg (171 lb 11.8 oz), SpO2 100 %.       NAD, Conversant    Psychicatric:poor judgment and insights, alert and oreinted        Intake/Output Summary (Last 24 hours) at 8/27/2022 1541  Last data filed at 8/27/2022 0400  Gross per 24 hour   Intake 480.42 ml   Output 3500 ml   Net -3019.58 ml      Recent Labs     08/27/22  0417   WBC 4.4*     Lab Results   Component Value Date/Time    Sodium 140 08/27/2022 04:17 AM    Potassium 3.4 (L) 08/27/2022 04:17 AM    Chloride 107 08/27/2022 04:17 AM    CO2 26 08/27/2022 04:17 AM    Anion gap 7 08/27/2022 04:17 AM    Glucose 78 08/27/2022 04:17 AM    BUN 36 (H) 08/27/2022 04:17 AM    Creatinine 6.17 (H) 08/27/2022 04:17 AM    BUN/Creatinine ratio 6 (L) 08/27/2022 04:17 AM    GFR est AA 12 (L) 08/27/2022 04:17 AM    GFR est non-AA 10 (L) 08/27/2022 04:17 AM    Calcium 8.6 08/27/2022 04:17 AM        Current Facility-Administered Medications   Medication Dose Route Frequency Provider Last Rate Last Admin    diphenhydrAMINE (BENADRYL) injection 12.5 mg  12.5 mg IntraVENous Q6H PRN Elza Watson MD   12.5 mg at 08/27/22 0154    meropenem (MERREM) 500 mg in 0.9% sodium chloride (MBP/ADV) 50 mL MBP  0.5 g IntraVENous Q12H Allyn Crawford  mL/hr at 08/27/22 1221 500 mg at 08/27/22 1221    linezolid in dextrose 5% (ZYVOX) IVPB premix in D5W 600 mg  600 mg IntraVENous Q12H Allyn Crawford  mL/hr at 08/27/22 1221 600 mg at 08/27/22 1221    niCARdipine (CARDENE) 25 mg in 0.9% sodium chloride 250 mL infusion  5-15 mg/hr IntraVENous TITRATE Blanca Tang MD 50 mL/hr at 08/27/22 0506 5 mg/hr at 08/27/22 0506    sevelamer carbonate (RENVELA) tab 1,600 mg  1,600 mg Oral TID WITH MEALS José Alston MD   1,600 mg at 08/27/22 1221    doxercalciferoL (HECTOROL) 4 mcg/2 mL injection 4 mcg  4 mcg IntraVENous DIALYSIS MON, WED & Brett José Marshall MD        aspirin delayed-release tablet 81 mg  81 mg Oral DAILY Yaw Desir MD   81 mg at 08/27/22 0945    isosorbide mononitrate ER (IMDUR) tablet 60 mg  60 mg Oral DAILY Yaw Desir MD   60 mg at 08/27/22 0945    calcitRIOL (ROCALTROL) capsule 0.25 mcg  0.25 mcg Oral Q MON, WED & Oscar Andino MD   0.25 mcg at 08/26/22 2342    sodium bicarbonate tablet 650 mg  650 mg Oral TID Yaw Desir MD        hydrALAZINE (APRESOLINE) 20 mg/mL injection 10 mg  10 mg IntraVENous Q6H PRN Yaw Desir MD   10 mg at 08/26/22 1914    sodium chloride (NS) flush 5-40 mL  5-40 mL IntraVENous Q8H Yaw Desir MD   10 mL at 08/26/22 2315    sodium chloride (NS) flush 5-40 mL  5-40 mL IntraVENous PRN Yaw Desir MD        acetaminophen (TYLENOL) tablet 650 mg  650 mg Oral Q6H PRN Yaw Desir MD   650 mg at 08/26/22 2314    Or    acetaminophen (TYLENOL) suppository 650 mg  650 mg Rectal Q6H PRN Yaw Desir MD        bisacodyL (DULCOLAX) suppository 10 mg  10 mg Rectal DAILY PRN Yaw Desir MD        promethazine (PHENERGAN) tablet 12.5 mg  12.5 mg Oral Q6H PRN Yaw Desir MD   12.5 mg at 08/27/22 0357    Or    ondansetron (ZOFRAN) injection 4 mg  4 mg IntraVENous Q6H PRN Yaw Desir MD        heparin (porcine) injection 5,000 Units  5,000 Units SubCUTAneous Q8H Yaw Desir MD   5,000 Units at 08/27/22 0945    amLODIPine (NORVASC) tablet 10 mg  10 mg Oral DAILY London Finch MD   10 mg at 08/27/22 0945    losartan (COZAAR) tablet 100 mg  100 mg Oral DAILY London Finch MD   100 mg at 08/27/22 0945    pantoprazole (PROTONIX) tablet 40 mg  40 mg Oral ACB London Finch MD   40 mg at 08/27/22 0631    heparin (porcine) 1,000 unit/mL injection 3,200 Units  3,200 Units Hemodialysis DIALYSIS CONTINUOUS Jsoé Alston MD   3,200 Units at 08/26/22 6362      Total critical time spent 31 minutes  used

## 2022-08-27 NOTE — ED NOTES
Pt still hypertensive at this time post hydralazine, MD paged and made aware of high BP. Per MD, cardene to be restarted.

## 2022-08-27 NOTE — PROGRESS NOTES
Hospitalist Progress Note    Patient: Eliseo De Jesus MRN: [id number]  CSN: [id number]    YOB: 1970  Age: 50 y.o. Sex: male    DOA: 8/26/2022 LOS:  LOS: 1 day            Assessment/Plan     Principal Problem:    Hypertensive emergency (8/26/2022)    Active Problems:    ESRD (end stage renal disease) on dialysis ([facility] [address] (1/29/2020)      HTN (hypertension) (1/29/2020)      Hx of BKA, right ([facility] [address] (1/29/2020)      Non-compliance with renal dialysis ([facility] [address] (8/27/2021)      Pulmonary edema (6/20/2022)      Hypoxia (8/26/2022)      A 50 y.o. male with history of hypertension, end-stage renal disease on HD, hyperlipidemia, noncompliance with dialysis , cardiomegaly admitted ICU for HTN emergency           CV:   Hypertension , hypertensive emergency/ICU care for Cardene drip, hydralazine as needed, today gaol < 190, restart home med  Nephrology recommended Norvasc, spironolactone and losartan-prescription noted. Respiratory  Hypoxia, due to pulmonary edema, secondary due to miss hd -noncompliance  Had HD yesterday  Discussed the case with Dr. Vernell Casillas intensivist      Renal   ESRD on HD: Had urgent HD, renal on board,       Heme    Anemia -due to esrd , no bleeding reported   Will continue monitoring h/h       Sickle cell trait      Endo   History of Hypoglycemia  Put hypoglycemia protocol      Right BKA, fall precaution      Multiple admissions he will benefit to assisted living, care manager-will be on board     Full code    ICU care       CC:   Eliseo De Jesus is a 50 y.o. male with history of hypertension, end-stage renal disease on HD, hyperlipidemia, noncompliance with dialysis , cardiomegaly admitted for HTN emergency         Subjective:     Pt was seen and examined with the nurse in the morning round. Had HD yesterday, no CP/SOB. On nicardipine 5 mg/h      Review of systems  General: No fevers or chills. Cardiovascular: No chest pain or pressure. No palpitations.    Pulmonary: No cough, SOB  Gastrointestinal: No nausea, vomiting. Objective:      Visit Vitals  BP (!) 153/66   Pulse 82   Temp 98.6 °F (37 °C)   Resp 20   Ht 5' 7\" (1.702 m)   Wt 77.9 kg (171 lb 11.8 oz)   SpO2 100%   BMI 26.90 kg/m²       Physical Exam:    Gen: NAD, non-toxic. Heent:  MMM, NC, AT. Cor: s1s2 RRR. No MRG. PMI mid 5th intercostal space. Resp:  CTA b/l. No w/r/r. Nml effort and diaphragmatic excursion. Abd:  NT ND.  BS positive. No rebound or guarding. No masses. Ext: No edema or cyanosis. Intake and Output:  Current Shift:  No intake/output data recorded. Last three shifts:  08/25 1901 - 08/27 0700  In: 480.4 [I.V.:480.4]  Out: 3500     Labs: Results:       Chemistry Recent Labs     08/27/22  0417 08/26/22  1415   GLU 78 74    136   K 3.4* 4.1    102   CO2 26 21   BUN 36* 85*   CREA 6.17* 11.50*   CA 8.6 8.9   AGAP 7 13   BUCR 6* 7*   AP  --  102   TP  --  8.1   ALB  --  2.9*   GLOB  --  5.2*   AGRAT  --  0.6*      CBC w/Diff Recent Labs     08/27/22  0417 08/26/22  1415   WBC 4.4* 5.4   RBC 3.59* 3.45*   HGB 9.6* 9.4*   HCT 29.1* 27.9*    124*   GRANS 72 75*   LYMPH 11* 9*   EOS 7* 5      Cardiac Enzymes No results for input(s): CPK, CKND1, WILLIE in the last 72 hours. No lab exists for component: CKRMB, TROIP   Coagulation Recent Labs     08/26/22  1415   PTP 15.9*   INR 1.2       Lipid Panel Lab Results   Component Value Date/Time    Cholesterol, total 115 06/02/2022 09:45 AM    HDL Cholesterol 62 (H) 06/02/2022 09:45 AM    LDL, calculated 46 06/02/2022 09:45 AM    VLDL, calculated 7 06/02/2022 09:45 AM    Triglyceride 35 06/02/2022 09:45 AM    CHOL/HDL Ratio 1.9 06/02/2022 09:45 AM      BNP No results for input(s): BNPP in the last 72 hours.    Liver Enzymes Recent Labs     08/26/22  1415   TP 8.1   ALB 2.9*         Thyroid Studies Lab Results   Component Value Date/Time    TSH 2.38 08/29/2021 01:20 AM        Procedures/imaging: see electronic medical records for all procedures/Xrays and details which were not copied into this note but were reviewed prior to creation of Plan      Medications Reviewed  Chuy Esquivel MD

## 2022-08-28 ENCOUNTER — APPOINTMENT (OUTPATIENT)
Dept: NON INVASIVE DIAGNOSTICS | Age: 52
DRG: 304 | End: 2022-08-28
Attending: INTERNAL MEDICINE
Payer: MEDICARE

## 2022-08-28 ENCOUNTER — APPOINTMENT (OUTPATIENT)
Dept: GENERAL RADIOLOGY | Age: 52
DRG: 304 | End: 2022-08-28
Attending: INTERNAL MEDICINE
Payer: MEDICARE

## 2022-08-28 PROBLEM — K52.9 CHRONIC DIARRHEA: Status: ACTIVE | Noted: 2022-08-28

## 2022-08-28 LAB
ANION GAP SERPL CALC-SCNC: 9 MMOL/L (ref 3–18)
BASOPHILS # BLD: 0 K/UL (ref 0–0.1)
BASOPHILS NFR BLD: 1 % (ref 0–2)
BUN SERPL-MCNC: 49 MG/DL (ref 7–18)
BUN/CREAT SERPL: 6 (ref 12–20)
CA-I SERPL-SCNC: 1.09 MMOL/L (ref 1.12–1.32)
CALCIUM SERPL-MCNC: 8.2 MG/DL (ref 8.5–10.1)
CHLORIDE SERPL-SCNC: 107 MMOL/L (ref 100–111)
CO2 SERPL-SCNC: 23 MMOL/L (ref 21–32)
CREAT SERPL-MCNC: 7.89 MG/DL (ref 0.6–1.3)
DIFFERENTIAL METHOD BLD: ABNORMAL
ECHO AO ASC DIAM: 2.9 CM
ECHO AO ASCENDING AORTA INDEX: 1.53 CM/M2
ECHO AO ROOT DIAM: 3 CM
ECHO AO ROOT INDEX: 1.59 CM/M2
ECHO AV AREA PEAK VELOCITY: 1.9 CM2
ECHO AV AREA VTI: 2.2 CM2
ECHO AV AREA/BSA PEAK VELOCITY: 1 CM2/M2
ECHO AV AREA/BSA VTI: 1.2 CM2/M2
ECHO AV MEAN GRADIENT: 10 MMHG
ECHO AV MEAN VELOCITY: 1.5 M/S
ECHO AV PEAK GRADIENT: 16 MMHG
ECHO AV PEAK VELOCITY: 2 M/S
ECHO AV VELOCITY RATIO: 0.65
ECHO AV VTI: 33.6 CM
ECHO LA DIAMETER INDEX: 2.65 CM/M2
ECHO LA DIAMETER: 5 CM
ECHO LA TO AORTIC ROOT RATIO: 1.67
ECHO LA VOL 2C: 62 ML (ref 18–58)
ECHO LA VOL 4C: 116 ML (ref 18–58)
ECHO LA VOL BP: 88 ML (ref 18–58)
ECHO LA VOL/BSA BIPLANE: 47 ML/M2 (ref 16–34)
ECHO LA VOLUME AREA LENGTH: 91 ML
ECHO LA VOLUME INDEX A2C: 33 ML/M2 (ref 16–34)
ECHO LA VOLUME INDEX A4C: 61 ML/M2 (ref 16–34)
ECHO LA VOLUME INDEX AREA LENGTH: 48 ML/M2 (ref 16–34)
ECHO LV E' LATERAL VELOCITY: 7 CM/S
ECHO LV E' SEPTAL VELOCITY: 5 CM/S
ECHO LV EDV A2C: 80 ML
ECHO LV EDV A4C: 90 ML
ECHO LV EDV BP: 86 ML (ref 67–155)
ECHO LV EDV INDEX A4C: 48 ML/M2
ECHO LV EDV INDEX BP: 46 ML/M2
ECHO LV EDV NDEX A2C: 42 ML/M2
ECHO LV EJECTION FRACTION A2C: 47 %
ECHO LV EJECTION FRACTION A4C: 66 %
ECHO LV EJECTION FRACTION BIPLANE: 57 % (ref 55–100)
ECHO LV ESV A2C: 42 ML
ECHO LV ESV A4C: 31 ML
ECHO LV ESV BP: 37 ML (ref 22–58)
ECHO LV ESV INDEX A2C: 22 ML/M2
ECHO LV ESV INDEX A4C: 16 ML/M2
ECHO LV ESV INDEX BP: 20 ML/M2
ECHO LV FRACTIONAL SHORTENING: 34 % (ref 28–44)
ECHO LV INTERNAL DIMENSION DIASTOLE INDEX: 2.96 CM/M2
ECHO LV INTERNAL DIMENSION DIASTOLIC: 5.6 CM (ref 4.2–5.9)
ECHO LV INTERNAL DIMENSION SYSTOLIC INDEX: 1.96 CM/M2
ECHO LV INTERNAL DIMENSION SYSTOLIC: 3.7 CM
ECHO LV IVSD: 0.8 CM (ref 0.6–1)
ECHO LV MASS 2D: 234.3 G (ref 88–224)
ECHO LV MASS INDEX 2D: 124 G/M2 (ref 49–115)
ECHO LV POSTERIOR WALL DIASTOLIC: 1.3 CM (ref 0.6–1)
ECHO LV RELATIVE WALL THICKNESS RATIO: 0.46
ECHO LVOT AREA: 3.1 CM2
ECHO LVOT AV VTI INDEX: 0.69
ECHO LVOT DIAM: 2 CM
ECHO LVOT MEAN GRADIENT: 3 MMHG
ECHO LVOT PEAK GRADIENT: 6 MMHG
ECHO LVOT PEAK VELOCITY: 1.3 M/S
ECHO LVOT STROKE VOLUME INDEX: 38.7 ML/M2
ECHO LVOT SV: 73.2 ML
ECHO LVOT VTI: 23.3 CM
ECHO MV A VELOCITY: 1.41 M/S
ECHO MV AREA PHT: 2.5 CM2
ECHO MV AREA VTI: 2.1 CM2
ECHO MV E DECELERATION TIME (DT): 199.4 MS
ECHO MV E VELOCITY: 1.13 M/S
ECHO MV E/A RATIO: 0.8
ECHO MV E/E' LATERAL: 16.14
ECHO MV E/E' RATIO (AVERAGED): 19.37
ECHO MV E/E' SEPTAL: 22.6
ECHO MV LVOT VTI INDEX: 1.52
ECHO MV MAX VELOCITY: 1.3 M/S
ECHO MV MEAN GRADIENT: 4 MMHG
ECHO MV MEAN VELOCITY: 0.9 M/S
ECHO MV PEAK GRADIENT: 7 MMHG
ECHO MV PRESSURE HALF TIME (PHT): 88.6 MS
ECHO MV VTI: 35.3 CM
ECHO RV FREE WALL PEAK S': 20 CM/S
ECHO RV INTERNAL DIMENSION: 4.2 CM
ECHO RV TAPSE: 2.3 CM (ref 1.7–?)
EOSINOPHIL # BLD: 0.4 K/UL (ref 0–0.4)
EOSINOPHIL NFR BLD: 6 % (ref 0–5)
ERYTHROCYTE [DISTWIDTH] IN BLOOD BY AUTOMATED COUNT: 20.2 % (ref 11.6–14.5)
GLUCOSE BLD STRIP.AUTO-MCNC: 76 MG/DL (ref 70–110)
GLUCOSE BLD STRIP.AUTO-MCNC: 83 MG/DL (ref 70–110)
GLUCOSE BLD STRIP.AUTO-MCNC: 92 MG/DL (ref 70–110)
GLUCOSE SERPL-MCNC: 83 MG/DL (ref 74–99)
HCT VFR BLD AUTO: 29.1 % (ref 36–48)
HGB BLD-MCNC: 9.4 G/DL (ref 13–16)
IMM GRANULOCYTES # BLD AUTO: 0 K/UL (ref 0–0.04)
IMM GRANULOCYTES NFR BLD AUTO: 0 % (ref 0–0.5)
LYMPHOCYTES # BLD: 0.8 K/UL (ref 0.9–3.6)
LYMPHOCYTES NFR BLD: 15 % (ref 21–52)
MAGNESIUM SERPL-MCNC: 2.5 MG/DL (ref 1.6–2.6)
MCH RBC QN AUTO: 26.3 PG (ref 24–34)
MCHC RBC AUTO-ENTMCNC: 32.3 G/DL (ref 31–37)
MCV RBC AUTO: 81.5 FL (ref 78–100)
MONOCYTES # BLD: 0.5 K/UL (ref 0.05–1.2)
MONOCYTES NFR BLD: 10 % (ref 3–10)
NEUTS SEG # BLD: 3.7 K/UL (ref 1.8–8)
NEUTS SEG NFR BLD: 68 % (ref 40–73)
NRBC # BLD: 0 K/UL (ref 0–0.01)
NRBC BLD-RTO: 0 PER 100 WBC
PLATELET # BLD AUTO: 206 K/UL (ref 135–420)
PMV BLD AUTO: 10.1 FL (ref 9.2–11.8)
POTASSIUM SERPL-SCNC: 4.3 MMOL/L (ref 3.5–5.5)
RBC # BLD AUTO: 3.57 M/UL (ref 4.35–5.65)
SODIUM SERPL-SCNC: 139 MMOL/L (ref 136–145)
WBC # BLD AUTO: 5.5 K/UL (ref 4.6–13.2)

## 2022-08-28 PROCEDURE — 74011000258 HC RX REV CODE- 258: Performed by: INTERNAL MEDICINE

## 2022-08-28 PROCEDURE — 74011250636 HC RX REV CODE- 250/636: Performed by: HOSPITALIST

## 2022-08-28 PROCEDURE — 74011250636 HC RX REV CODE- 250/636: Performed by: INTERNAL MEDICINE

## 2022-08-28 PROCEDURE — 74011250637 HC RX REV CODE- 250/637: Performed by: HOSPITALIST

## 2022-08-28 PROCEDURE — 83735 ASSAY OF MAGNESIUM: CPT

## 2022-08-28 PROCEDURE — 74011250637 HC RX REV CODE- 250/637: Performed by: INTERNAL MEDICINE

## 2022-08-28 PROCEDURE — 82962 GLUCOSE BLOOD TEST: CPT

## 2022-08-28 PROCEDURE — 82330 ASSAY OF CALCIUM: CPT

## 2022-08-28 PROCEDURE — 77010033678 HC OXYGEN DAILY

## 2022-08-28 PROCEDURE — 74011250637 HC RX REV CODE- 250/637: Performed by: STUDENT IN AN ORGANIZED HEALTH CARE EDUCATION/TRAINING PROGRAM

## 2022-08-28 PROCEDURE — 85025 COMPLETE CBC W/AUTO DIFF WBC: CPT

## 2022-08-28 PROCEDURE — 93306 TTE W/DOPPLER COMPLETE: CPT

## 2022-08-28 PROCEDURE — 65270000046 HC RM TELEMETRY

## 2022-08-28 PROCEDURE — 74011000250 HC RX REV CODE- 250: Performed by: HOSPITALIST

## 2022-08-28 PROCEDURE — 36415 COLL VENOUS BLD VENIPUNCTURE: CPT

## 2022-08-28 PROCEDURE — 71045 X-RAY EXAM CHEST 1 VIEW: CPT

## 2022-08-28 PROCEDURE — 80048 BASIC METABOLIC PNL TOTAL CA: CPT

## 2022-08-28 RX ORDER — LOPERAMIDE HYDROCHLORIDE 2 MG/1
2 CAPSULE ORAL
Status: DISCONTINUED | OUTPATIENT
Start: 2022-08-28 | End: 2022-08-31 | Stop reason: HOSPADM

## 2022-08-28 RX ORDER — CALCIUM GLUCONATE 20 MG/ML
2 INJECTION, SOLUTION INTRAVENOUS ONCE
Status: COMPLETED | OUTPATIENT
Start: 2022-08-28 | End: 2022-08-28

## 2022-08-28 RX ADMIN — ASPIRIN 81 MG: 81 TABLET, COATED ORAL at 08:45

## 2022-08-28 RX ADMIN — HYDRALAZINE HYDROCHLORIDE 10 MG: 20 INJECTION INTRAMUSCULAR; INTRAVENOUS at 03:49

## 2022-08-28 RX ADMIN — HEPARIN SODIUM 5000 UNITS: 5000 INJECTION INTRAVENOUS; SUBCUTANEOUS at 01:01

## 2022-08-28 RX ADMIN — CALCIUM GLUCONATE 2 G: 20 INJECTION, SOLUTION INTRAVENOUS at 05:33

## 2022-08-28 RX ADMIN — HEPARIN SODIUM 5000 UNITS: 5000 INJECTION INTRAVENOUS; SUBCUTANEOUS at 17:13

## 2022-08-28 RX ADMIN — LINEZOLID 600 MG: 600 INJECTION, SOLUTION INTRAVENOUS at 21:33

## 2022-08-28 RX ADMIN — PANTOPRAZOLE SODIUM 40 MG: 40 TABLET, DELAYED RELEASE ORAL at 07:43

## 2022-08-28 RX ADMIN — MEROPENEM 500 MG: 500 INJECTION, POWDER, FOR SOLUTION INTRAVENOUS at 09:28

## 2022-08-28 RX ADMIN — HYDRALAZINE HYDROCHLORIDE 10 MG: 20 INJECTION INTRAMUSCULAR; INTRAVENOUS at 11:03

## 2022-08-28 RX ADMIN — SEVELAMER CARBONATE 1600 MG: 800 TABLET, FILM COATED ORAL at 11:00

## 2022-08-28 RX ADMIN — LOPERAMIDE HYDROCHLORIDE 2 MG: 2 CAPSULE ORAL at 10:59

## 2022-08-28 RX ADMIN — LOSARTAN POTASSIUM 100 MG: 50 TABLET, FILM COATED ORAL at 08:45

## 2022-08-28 RX ADMIN — SODIUM BICARBONATE 650 MG: 650 TABLET ORAL at 21:34

## 2022-08-28 RX ADMIN — ISOSORBIDE MONONITRATE 60 MG: 60 TABLET, EXTENDED RELEASE ORAL at 08:45

## 2022-08-28 RX ADMIN — LOPERAMIDE HYDROCHLORIDE 2 MG: 2 CAPSULE ORAL at 17:18

## 2022-08-28 RX ADMIN — SEVELAMER CARBONATE 1600 MG: 800 TABLET, FILM COATED ORAL at 07:43

## 2022-08-28 RX ADMIN — SODIUM CHLORIDE, PRESERVATIVE FREE 10 ML: 5 INJECTION INTRAVENOUS at 21:34

## 2022-08-28 RX ADMIN — SODIUM CHLORIDE, PRESERVATIVE FREE 10 ML: 5 INJECTION INTRAVENOUS at 13:42

## 2022-08-28 RX ADMIN — DIPHENHYDRAMINE HYDROCHLORIDE 12.5 MG: 50 INJECTION, SOLUTION INTRAMUSCULAR; INTRAVENOUS at 01:35

## 2022-08-28 RX ADMIN — LINEZOLID 600 MG: 600 INJECTION, SOLUTION INTRAVENOUS at 08:46

## 2022-08-28 RX ADMIN — MEROPENEM 500 MG: 500 INJECTION, POWDER, FOR SOLUTION INTRAVENOUS at 21:34

## 2022-08-28 RX ADMIN — AMLODIPINE BESYLATE 10 MG: 5 TABLET ORAL at 08:45

## 2022-08-28 RX ADMIN — SEVELAMER CARBONATE 1600 MG: 800 TABLET, FILM COATED ORAL at 17:13

## 2022-08-28 RX ADMIN — HEPARIN SODIUM 5000 UNITS: 5000 INJECTION INTRAVENOUS; SUBCUTANEOUS at 08:46

## 2022-08-28 RX ADMIN — SODIUM BICARBONATE 650 MG: 650 TABLET ORAL at 08:45

## 2022-08-28 NOTE — PROGRESS NOTES
D/c plan: Home vs home w/ [facility] [place] pending medical progression. Family to transport. Pt transferred to the floor today from ICU. CM met w/ pt at bedside. Confirmed information on the pt's face sheet. Pt lives home alone. He is independent in all ADLs and IDLs using a RW. Pt does drive. However, he uses Medicaid transport for his dialysis. Pt would like assistance with finding a new PCP in [place]. CMS to assist.     Pt is currently on 2L of O2. Pt is not on O2 at baseline. He will need to be weaned off of the O2 or have a 6 min walk test.     Pt is a dialysis Pt. [facility]. MWF. His chair time is 1315 and his run time is 4hours. He uses Medicaid transport to get to and from dialysis. Pt misses multiple dialysis sessions monthly. Reason for Admission:   Hypertensive emergency [I16.1]  ESRD (end stage renal disease) ([facility] [address] [N18.6]  Medically noncompliant [Z91.19]    PCP: Pt would like assistance w/ establishing a new PCP    There are currently no Active Isolations  ESBL                RUR Score:     30%             Resources/supports,as identified by patient/family:   Brother      Barriers to discharge: Hypertensive emergency              Plan for utilizing home health:    TBD                          Likelihood of readmission:   high         Transition of Care Plan:    home vs home w/ [facility] [place]                 Initial assessment completed with patient. Cognitive status of patient: oriented to time, place, person and situation. Face sheet information confirmed:  yes. The patient designates Brother to participate in his discharge plan and to receive any needed information. This patient lives in a single family home with patient. Patient is able to navigate steps as needed. Prior to hospitalization, patient was considered to be independent with ADLs/IADLS : yes . The other:  Brother  will be available to transport patient home upon discharge.    The patient already has Rubie Mcardle,  medical equipment available in the home. Patient is not currently active with home health. Patient has not stayed in a skilled nursing facility or rehab. This patient is on dialysis/days :yes    If yes, hemodialysis patient and receives treatment on Monday/Wednesday/Friday at [facility]. Chair time is 1315. Run time is 4 hours. Currently, the discharge plan is Home. The patient states that he can obtain his medications from the pharmacy, and take his medications as directed. Patient's current insurance is Payor: Ludwin Cornejofer / Plan: [address] [place] [address] HMO / Product Type: Managed Care Medicare /        Care Management Interventions  PCP Verified by CM: Yes (Pt was seeing Dr. Karena Valencia. Pt is requesting assistance w/ establishing a new PCP in the [facility]. CMS to assist. )  Mode of Transport at Discharge:  Other (see comment) (family)  Transition of Care Consult (CM Consult): Discharge Planning (Anticipate home vs home w/ [facility] [place] pending medical progression )  Discharge Durable Medical Equipment: No (Pt has a RW)  Physical Therapy Consult: Yes  Occupational Therapy Consult: Yes  Speech Therapy Consult: No  Support Systems:  (Brother)  Confirm Follow Up Transport: Self  The Plan for Transition of Care is Related to the Following Treatment Goals : home vs home w/ [facility] [place]  The Patient and/or Patient Representative was Provided with a Choice of Provider and Agrees with the Discharge Plan?: Yes (The pt is alert and oriented. )  Discharge Location  Patient Expects to be Discharged to[de-identified] Home

## 2022-08-28 NOTE — PROGRESS NOTES
Hospitalist Progress Note    Patient: Tigist Hoover MRN: [id number]  CSN: [id number]    YOB: 1970  Age: 50 y.o. Sex: male    DOA: 8/26/2022 LOS:  LOS: 2 days            Assessment/Plan     Principal Problem:    Hypertensive emergency (8/26/2022)    Active Problems:    ESRD (end stage renal disease) on dialysis ([address] (1/29/2020)      HTN (hypertension) (1/29/2020)      Hx of BKA, right ([address] (1/29/2020)      Non-compliance with renal dialysis ([address] (8/27/2021)      Acute pulmonary edema ([address] (6/20/2022)      Hypoxia (8/26/2022)      Cardiomegaly (8/27/2022)      Pulmonary infiltrates (8/27/2022)      History of COVID-19 (8/27/2022)      Chronic anemia (8/27/2022)      Chronic diarrhea (8/28/2022)      A 50 y.o. male with history of hypertension, end-stage renal disease on HD, hyperlipidemia, noncompliance with dialysis , cardiomegaly admitted ICU for HTN emergency. Off cardene drip, but having loose BM today. CV:   Hypertension , hypertensive emergency/ICU care for Cardene drip, hydralazine as needed, today gaol < 190, restart home med  Nephrology recommended Norvasc, spironolactone and losartan-prescription noted. Respiratory  Hypoxia, due to pulmonary edema, secondary due to miss hd -noncompliance  Had HD yesterday  Discussed the case with Dr. Zia Garcia intensivist      Renal   ESRD on HD: Had urgent HD, renal on board, HD per nephrology. Heme    Anemia -due to esrd , no bleeding reported   Will continue monitoring h/h       Sickle cell trait     GI: chronic diarrhea.  Add imodium     Endo   History of Hypoglycemia  Put hypoglycemia protocol      Right BKA, fall precaution      Multiple admissions he will benefit to assisted living, care manager-will be on board      Full code     Transfer to Tele today        CC:   Tigist Hoover is a 50 y.o. male with history of hypertension, end-stage renal disease on HD, hyperlipidemia, noncompliance with dialysis , cardiomegaly admitted for HTN emergency                   Subjective:     Pt was seen and examined with the nurse in the morning round. Having loose BM multiple times this am but imodium works. Feeling better. Review of systems  General: No fevers or chills. Cardiovascular: No chest pain or pressure. No palpitations. Pulmonary: No cough, SOB  Gastrointestinal: No nausea, vomiting. + diarrhea     Objective:      Visit Vitals  /88   Pulse 95   Temp 98.2 °F (36.8 °C)   Resp 21   Ht 5' 7\" (1.702 m)   Wt 77.6 kg (171 lb)   SpO2 100%   BMI 26.78 kg/m²       Physical Exam:    Gen: NAD, chronic ill appearing. Heent:  MMM, NC, AT. Cor: s1s2 RRR. No MRG. PMI mid 5th intercostal space. Resp:  CTA b/l. No w/r/r. Nml effort and diaphragmatic excursion. Abd:  NT ND.  BS positive. No rebound or guarding. No masses. Ext: right BKA      Intake and Output:  Current Shift:  08/28 0701 - 08/28 1900  In: 350 [I.V.:350]  Out: -   Last three shifts:  08/26 1901 - 08/28 0700  In: 1770.8 [I.V.:1770.8]  Out: 3600 [Urine:100]    Labs: Results:       Chemistry Recent Labs     08/28/22 0428 08/27/22 0417 08/26/22  1415   GLU 83 78 74    140 136   K 4.3 3.4* 4.1    107 102   CO2 23 26 21   BUN 49* 36* 85*   CREA 7.89* 6.17* 11.50*   CA 8.2* 8.6 8.9   AGAP 9 7 13   BUCR 6* 6* 7*   AP  --   --  102   TP  --   --  8.1   ALB  --   --  2.9*   GLOB  --   --  5.2*   AGRAT  --   --  0.6*      CBC w/Diff Recent Labs     08/28/22 0428 08/27/22 0417 08/26/22  1415   WBC 5.5 4.4* 5.4   RBC 3.57* 3.59* 3.45*   HGB 9.4* 9.6* 9.4*   HCT 29.1* 29.1* 27.9*    153 124*   GRANS 68 72 75*   LYMPH 15* 11* 9*   EOS 6* 7* 5      Cardiac Enzymes No results for input(s): CPK, CKND1, WILLIE in the last 72 hours.     No lab exists for component: CKRMB, TROIP   Coagulation Recent Labs     08/26/22  1415   PTP 15.9*   INR 1.2       Lipid Panel Lab Results   Component Value Date/Time    Cholesterol, total 115 06/02/2022 09:45 AM    HDL Cholesterol 62 (H) 06/02/2022 09:45 AM    LDL, calculated 46 06/02/2022 09:45 AM    VLDL, calculated 7 06/02/2022 09:45 AM    Triglyceride 35 06/02/2022 09:45 AM    CHOL/HDL Ratio 1.9 06/02/2022 09:45 AM      BNP No results for input(s): BNPP in the last 72 hours.    Liver Enzymes Recent Labs     08/26/22  1415   TP 8.1   ALB 2.9*         Thyroid Studies Lab Results   Component Value Date/Time    TSH 2.38 08/29/2021 01:20 AM        Procedures/imaging: see electronic medical records for all procedures/Xrays and details which were not copied into this note but were reviewed prior to creation of Plan      Medications Reviewed  Dulce Maldonado MD

## 2022-08-28 NOTE — PROGRESS NOTES
[facility]  Pulmonary, Critical Care, and Sleep Medicine    Name: Maria Khan MRN: [id number]   : 1970 Hospital: [facility] [place]    Date: 2022  Room: [address] [place] [address] Note                                              Consult requesting physician: Dr. Niall Charles  Reason for Consult: HTN Urgency, ESRD on dialysis      IMPRESSION:   Hypertension emergency  End-stage renal disease on hemodialysis  Cardiomegaly  Acute pulmonary edema  Bilateral pulmonary infiltrates  Chronic anemia  Chronic diarrhea  History of MSSA bacteremia/AV graft infection 2022      Patient Active Problem List   Diagnosis Code    Iron deficiency anemia D50.9    ESRD (end stage renal disease) on dialysis ([facility] [address] N18.6, Z99.2    HTN (hypertension) I10    Hx of BKA, right ([address] Z89.511    Non-compliance with renal dialysis ([facility] [address] Z91.15    Hypertension I10    Acute pulmonary edema ([address] J81.0    Arteriovenous graft infection ([facility] [address] T82. 7XXA    Hypoxia R09.02    Hypertensive emergency I16.1    Cardiomegaly I51.7    Pulmonary infiltrates R91.8    History of COVID-19 Z86.16    Chronic anemia D64.9    Chronic diarrhea K52.9       Code status: Full Code      RECOMMENDATIONS:   Respiratory: Respirations; currently on nasal cannula oxygen at 2 L. Chest x-ray from today reviewed-cardiomegaly present; improved pulmonary edema; right chest wall dialysis catheter; no focal infiltrates or consolidations or significant pleural effusions noted. Keep SPO2 >=92%. HOB 30 degree elevation all the time. Aggressive pulmonary toileting. Aspiration precautions. Incentive spirometry. CVS: Patient has come off nicardipine drip. Oral antihypertensives-amlodipine, losartan, isosorbide mononitrate. 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 Troponin-no significant elevation. ID: Chest x-ray shows improvement in pulm edema; no focal consolidations.   History of COVID infection in June 2022. Procalcitonin elevated; blood cultures negative so far. Rapid COVID test-negative. Patient allergic to penicillin and IV vancomycin. Antibiotics-continue Merrem and linezolid for now; consider ID consultation on Monday. Follow cultures, and deescalate antibiotic when appropriate. Hematology/Oncology: Patient with chronic anemia; hemoglobin stable-9.4 this morning. Platelets-normal.  No active bleeding issues. Renal: S/p dialysis yesterday-3.5 L fluid removed. Monitor renal function. Calcium replaced. Nephrology-Dr. Jackeline Blanca. GI: PPI Prophylaxis. Chronic diarrhea-prn loperamide. Endocrine: Blood sugar stable; patient currently not needing sliding scale insulin. Neurology: Normal mentation. Nutrition: Oral diet. Prophylaxis: DVT Prophylaxis: Sc Heparin. GI Prophylaxis: Protonix. Lines/Tubes: PIV  Right chest wall dialysis catheter. Quality Care: PPI, DVT prophylaxis, HOB elevated, Infection control all reviewed and addressed. Care of plan d/w RN. D/w patient and updated management plans from ICU perspective (answered all questions to satisfaction). Patient likely can be transferred out of ICU later today. High complexity decision making was performed during the evaluation of this patient at high risk for decompensation with multiple organ involvement. Total critical care time spent rendering care exclusive of procedures/family discussion/coordination of care: 38 minutes. Subjective/History of Present Illness:     Patient is a 50 y.o. male with PMHx significant for end-stage renal disease on hemodialysis, hypertension, medical noncompliance. The patient had missed dialysis for few days, and presented to the dialysis center yesterday. He had significantly elevated blood pressure, and was sent to the ER. In the ER, his blood pressure was 233/109 mmHg on arrival, and was in pulmonary edema.   Patient needed antihypertensives, and subsequently placed on nicardipine drip, and transferred to the ICU. ID note reviewed from June 2022 admission. MSSA high grade and persistent bacteremia-- 6/20/2022 and 6/21/2022-- suspect TDC/AVG source. L arm AVG is opened up with drainage  --[facility] removed--6/22- cath tip MSSA   --AVG drain culture 6/22- S.aurues  Possible IE on MV- on TTE 6/22  COVID 19 Infection-- doubt viral PNA  --fully vaccinared and boosted 11/2021  - Patient was treated with cefazolin antibiotic      8/28/2022  Patient seen in ICU. Awake and alert. Breathing seems to be good. No chest pain or palpitations. No vomiting  Complaining of chronic diarrhea but Imodium use  Telemetry-sinus rhythm  Blood pressure-better controlled  Patient off nicardipine drip. S/p dialysis yesterday. Drips-none    Review of Systems: Unchanged  HEENT: No epistaxis, no nasal drainage, no difficulty in swallowing  Respiratory: No cough or shortness of breath or wheezing or hemoptysis  Cardiovascular: no chest pain, no palpitations, no chronic leg edema  Gastrointestinal: no abd pain, no vomiting, no bleeding symptoms  Genitourinary: No urinary symptoms or hematuria  Musculoskeletal: RT BKA  Neurological: No focal weakness, no seizures, no headaches  Behvioral/Psych: No anxiety, no depression  Constitutional: No fever, no chills      Allergies   Allergen Reactions    Penicillins Rash    Vancomycin Other (comments)     kayla syndrome      Past Medical History:   Diagnosis Date    Chronic kidney disease     Diabetes ([facility] [address]     Heart failure (HCC)     Hemodialysis patient ([facility] [address]     Hypertension     Sickle cell trait ([facility] [address]       Past Surgical History:   Procedure Laterality Date    HX ORTHOPAEDIC      right BKA 2017    IR DECLOT AV GRAFT PERCUTANEOUS  6/3/2022    IR INSERT NON TUNL CVC OVER 5 YRS  6/1/2022      Social History     Tobacco Use    Smoking status: Former    Smokeless tobacco: Never   Substance Use Topics    Alcohol use: No      History reviewed.  No pertinent family history. Prior to Admission medications    Medication Sig Start Date End Date Taking? Authorizing Provider   calcitRIOL (ROCALTROL) 0.25 mcg capsule Take 1 Capsule by mouth every Monday, Wednesday, Friday. 7/6/22   Efrain Arambula MD   epoetin delonte-epbx (RETACRIT) 20,000 unit/mL injection 1 mL by SubCUTAneous route every Monday, Wednesday, Friday. Indications: anemia due to kidney failure 7/6/22   Efrain Arambula MD   isosorbide mononitrate ER (IMDUR) 60 mg CR tablet Take 1 Tablet by mouth daily. 6/7/22   Minus MD Wiliam   cyanocobalamin 1,000 mcg tablet Take 1 Tablet by mouth daily. 6/7/22   Minus MD Wiliam   aspirin delayed-release 81 mg tablet Take 1 Tablet by mouth daily. 9/7/21   Chetan Grow, DO   doxazosin (CARDURA) 4 mg tablet Take 2 mg by mouth daily. Provider, Historical   sodium hypochlorite 0.0125% (DAKINS SOLUTION) Apply 1 mL to affected area two (2) times a week. Provider, Historical   omeprazole (PRILOSEC) 40 mg capsule Take 40 mg by mouth daily. Provider, Historical   sevelamer carbonate (RENVELA) 800 mg tab tab Take 800 mg by mouth three (3) times daily (with meals). Provider, Historical   sodium bicarbonate 650 mg tablet Take 650 mg by mouth three (3) times daily.   Patient not taking: Reported on 8/26/2022    Provider, Historical     Current Facility-Administered Medications   Medication Dose Route Frequency    meropenem (MERREM) 500 mg in 0.9% sodium chloride (MBP/ADV) 50 mL MBP  0.5 g IntraVENous Q12H    linezolid in dextrose 5% (ZYVOX) IVPB premix in D5W 600 mg  600 mg IntraVENous Q12H    niCARdipine (CARDENE) 25 mg in 0.9% sodium chloride 250 mL infusion  5-15 mg/hr IntraVENous TITRATE    sevelamer carbonate (RENVELA) tab 1,600 mg  1,600 mg Oral TID WITH MEALS    doxercalciferoL (HECTOROL) 4 mcg/2 mL injection 4 mcg  4 mcg IntraVENous DIALYSIS MON, WED & FRI    aspirin delayed-release tablet 81 mg  81 mg Oral DAILY    isosorbide mononitrate ER (IMDUR) tablet 60 mg  60 mg Oral DAILY    calcitRIOL (ROCALTROL) capsule 0.25 mcg  0.25 mcg Oral Q MON, WED & FRI    sodium bicarbonate tablet 650 mg  650 mg Oral TID    sodium chloride (NS) flush 5-40 mL  5-40 mL IntraVENous Q8H    heparin (porcine) injection 5,000 Units  5,000 Units SubCUTAneous Q8H    amLODIPine (NORVASC) tablet 10 mg  10 mg Oral DAILY    losartan (COZAAR) tablet 100 mg  100 mg Oral DAILY    pantoprazole (PROTONIX) tablet 40 mg  40 mg Oral ACB    heparin (porcine) 1,000 unit/mL injection 3,200 Units  3,200 Units Hemodialysis DIALYSIS CONTINUOUS         Objective:   Vital Signs:    Visit Vitals  /70   Pulse 99   Temp 98.3 °F (36.8 °C)   Resp 28   Ht 5' 7\" (1.702 m)   Wt 77.6 kg (171 lb)   SpO2 98%   BMI 26.78 kg/m²       O2 Device: Nasal cannula   O2 Flow Rate (L/min): 2 l/min   Temp (24hrs), Av.1 °F (36.7 °C), Min:97.8 °F (36.6 °C), Max:98.3 °F (36.8 °C)       Intake/Output:   Last shift:      No intake/output data recorded.     Last 3 shifts:  1901 -  0700  In: 1770.8 [I.V.:1770.8]  Out: 3600 [Urine:100]      Intake/Output Summary (Last 24 hours) at 2022 0943  Last data filed at 2022 0700  Gross per 24 hour   Intake 1370.41 ml   Output 100 ml   Net 1270.41 ml         Last 3 Recorded Weights in this Encounter    22 2210 22 0700 22 0751   Weight: 77.9 kg (171 lb 11.8 oz) 84.4 kg (186 lb 1.1 oz) 77.6 kg (171 lb)       Physical Exam:   Patient appears comfortable; currently on nasal cannula oxygen-2 L; acyanotic  HEENT: pupils not dilated, reactive, no scleral jaundice  Neck: No adenopathy or thyroid swelling  CVS: Normal heart sounds; S1 and S2 with no murmurs; telemetry-sinus rhythm; JVD not elevated   RS: Good air entry bilaterally; no wheezing or crackles; normal respirations  Abd: Soft, no tenderness, no distention, no guarding or rigidity, bowel sounds present, no hepatosplenomegaly   Neuro: non focal, awake, alert  Extrm: no leg edema or swelling or clubbing  Skin: no rash  Lymphatic: no cervical or supraclavicular adenopathy  Psych: Cooperative  Vasc: Right chest wall dialysis catheter  MS: Right below-knee amputation      Data:       Recent Results (from the past 24 hour(s))   CULTURE, BLOOD    Collection Time: 08/27/22 10:35 AM    Specimen: Blood   Result Value Ref Range    Special Requests: NO SPECIAL REQUESTS      Culture result: NO GROWTH AFTER 21 HOURS     CULTURE, BLOOD    Collection Time: 08/27/22 10:36 AM    Specimen: Blood   Result Value Ref Range    Special Requests: NO SPECIAL REQUESTS      Culture result: NO GROWTH AFTER 21 HOURS     TROPONIN-HIGH SENSITIVITY    Collection Time: 08/27/22 10:36 AM   Result Value Ref Range    Troponin-High Sensitivity 54 0 - 78 ng/L   PROCALCITONIN    Collection Time: 08/27/22 10:36 AM   Result Value Ref Range    Procalcitonin 4.02 ng/mL   GLUCOSE, POC    Collection Time: 08/27/22 12:18 PM   Result Value Ref Range    Glucose (POC) 83 70 - 110 mg/dL   COVID-19 RAPID TEST    Collection Time: 08/27/22  3:50 PM   Result Value Ref Range    Specimen source Nasopharyngeal      COVID-19 rapid test Not detected NOTD     GLUCOSE, POC    Collection Time: 08/27/22 11:31 PM   Result Value Ref Range    Glucose (POC) 114 (H) 70 - 093 mg/dL   METABOLIC PANEL, BASIC    Collection Time: 08/28/22  4:28 AM   Result Value Ref Range    Sodium 139 136 - 145 mmol/L    Potassium 4.3 3.5 - 5.5 mmol/L    Chloride 107 100 - 111 mmol/L    CO2 23 21 - 32 mmol/L    Anion gap 9 3.0 - 18 mmol/L    Glucose 83 74 - 99 mg/dL    BUN 49 (H) 7.0 - 18 MG/DL    Creatinine 7.89 (H) 0.6 - 1.3 MG/DL    BUN/Creatinine ratio 6 (L) 12 - 20      GFR est AA 9 (L) >60 ml/min/1.73m2    GFR est non-AA 7 (L) >60 ml/min/1.73m2    Calcium 8.2 (L) 8.5 - 10.1 MG/DL   MAGNESIUM    Collection Time: 08/28/22  4:28 AM   Result Value Ref Range    Magnesium 2.5 1.6 - 2.6 mg/dL   CBC WITH AUTOMATED DIFF    Collection Time: 08/28/22  4:28 AM   Result Value Ref Range    WBC 5.5 4.6 - 13.2 K/uL RBC 3.57 (L) 4.35 - 5.65 M/uL    HGB 9.4 (L) 13.0 - 16.0 g/dL    HCT 29.1 (L) 36.0 - 48.0 %    MCV 81.5 78.0 - 100.0 FL    MCH 26.3 24.0 - 34.0 PG    MCHC 32.3 31.0 - 37.0 g/dL    RDW 20.2 (H) 11.6 - 14.5 %    PLATELET 277 441 - 514 K/uL    MPV 10.1 9.2 - 11.8 FL    NRBC 0.0 0  WBC    ABSOLUTE NRBC 0.00 0.00 - 0.01 K/uL    NEUTROPHILS 68 40 - 73 %    LYMPHOCYTES 15 (L) 21 - 52 %    MONOCYTES 10 3 - 10 %    EOSINOPHILS 6 (H) 0 - 5 %    BASOPHILS 1 0 - 2 %    IMMATURE GRANULOCYTES 0 0.0 - 0.5 %    ABS. NEUTROPHILS 3.7 1.8 - 8.0 K/UL    ABS. LYMPHOCYTES 0.8 (L) 0.9 - 3.6 K/UL    ABS. MONOCYTES 0.5 0.05 - 1.2 K/UL    ABS. EOSINOPHILS 0.4 0.0 - 0.4 K/UL    ABS. BASOPHILS 0.0 0.0 - 0.1 K/UL    ABS. IMM.  GRANS. 0.0 0.00 - 0.04 K/UL    DF AUTOMATED     CALCIUM, IONIZED    Collection Time: 08/28/22  4:28 AM   Result Value Ref Range    Ionized Calcium 1.09 (L) 1.12 - 1.32 MMOL/L   GLUCOSE, POC    Collection Time: 08/28/22  5:19 AM   Result Value Ref Range    Glucose (POC) 83 70 - 110 mg/dL   ECHO ADULT COMPLETE    Collection Time: 08/28/22  8:25 AM   Result Value Ref Range    IVSd 0.8 0.6 - 1.0 cm    LVIDd 5.6 4.2 - 5.9 cm    LVIDs 3.7 cm    LVOT Diameter 2.0 cm    LVPWd 1.3 (A) 0.6 - 1.0 cm    EF BP 57 55 - 100 %    LV Ejection Fraction A2C 47 %    LV Ejection Fraction A4C 66 %    LV EDV A2C 80 mL    LV EDV A4C 90 mL    LV EDV BP 86 67 - 155 mL    LV ESV A2C 42 mL    LV ESV A4C 31 mL    LV ESV BP 37 22 - 58 mL    LVOT Peak Gradient 6 mmHg    LVOT Mean Gradient 3 mmHg    LVOT SV 73.2 ml    LVOT Peak Velocity 1.3 m/s    LVOT VTI 23.3 cm    RVIDd 4.2 cm    RV Free Wall Peak S' 20 cm/s    LA Diameter 5.0 cm    LA Volume A/L 91 mL    LA Volume 2C 62 (A) 18 - 58 mL    LA Volume 4C 116 (A) 18 - 58 mL    LA Volume BP 88 (A) 18 - 58 mL    AV Area by Peak Velocity 1.9 cm2    AV Area by VTI 2.2 cm2    AV Peak Gradient 16 mmHg    AV Mean Gradient 10 mmHg    AV Peak Velocity 2.0 m/s    AV Mean Velocity 1.5 m/s    AV VTI 33.6 cm MV A Velocity 1.41 m/s    MV E Wave Deceleration Time 199.4 ms    MV E Velocity 1.13 m/s    LV E' Lateral Velocity 7 cm/s    LV E' Septal Velocity 5 cm/s    MV Area by VTI 2.1 cm2    MV Peak Gradient 7 mmHg    MV Mean Gradient 4 mmHg    MV PHT 88.6 ms    MV Max Velocity 1.3 m/s    MV Mean Velocity 0.9 m/s    MV VTI 35.3 cm    MV Area by PHT 2.5 cm2    TAPSE 2.3 1.7 cm    Ascending Aorta 2.9 cm    Aortic Root 3.0 cm    Fractional Shortening 2D 34 28 - 44 %    LV ESV Index BP 20 mL/m2    LV EDV Index BP 46 mL/m2    LV ESV Index A4C 16 mL/m2    LV EDV Index A4C 48 mL/m2    LV ESV Index A2C 22 mL/m2    LV EDV Index A2C 42 mL/m2    LVIDd Index 2.96 cm/m2    LVIDs Index 1.96 cm/m2    LV RWT Ratio 0.46     LV Mass 2D 234.3 (A) 88 - 224 g    LV Mass 2D Index 124.0 (A) 49 - 115 g/m2    MV E/A 0.80     E/E' Ratio (Averaged) 19.37     E/E' Lateral 16.14     E/E' Septal 22.60     LA Volume Index BP 47 (A) 16 - 34 ml/m2    LA Volume Index A/L 48 16 - 34 mL/m2    LVOT Stroke Volume Index 38.7 mL/m2    LVOT Area 3.1 cm2    LA Volume Index 2C 33 16 - 34 mL/m2    LA Volume Index 4C 61 (A) 16 - 34 mL/m2    LA Size Index 2.65 cm/m2    LA/AO Root Ratio 1.67     Ao Root Index 1.59 cm/m2    Ascending Aorta Index 1.53 cm/m2    AV Velocity Ratio 0.65     LVOT:AV VTI Index 0.69     JOE/BSA VTI 1.2 cm2/m2    JOE/BSA Peak Velocity 1.0 cm2/m2    MV:LVOT VTI Index 1.52          Chemistry Recent Labs     08/28/22  0428 08/27/22  0417 08/26/22  1415   GLU 83 78 74    140 136   K 4.3 3.4* 4.1    107 102   CO2 23 26 21   BUN 49* 36* 85*   CREA 7.89* 6.17* 11.50*   CA 8.2* 8.6 8.9   MG 2.5 2.7*  --    AGAP 9 7 13   BUCR 6* 6* 7*   AP  --   --  102   TP  --   --  8.1   ALB  --   --  2.9*   GLOB  --   --  5.2*   AGRAT  --   --  0.6*          Lactic Acid Lactic acid   Date Value Ref Range Status   06/29/2022 1.9 0.4 - 2.0 MMOL/L Final     No results for input(s): LAC in the last 72 hours.      Liver Enzymes Protein, total   Date Value Ref Range Status   08/26/2022 8.1 6.4 - 8.2 g/dL Final     Albumin   Date Value Ref Range Status   08/26/2022 2.9 (L) 3.4 - 5.0 g/dL Final     Globulin   Date Value Ref Range Status   08/26/2022 5.2 (H) 2.0 - 4.0 g/dL Final     A-G Ratio   Date Value Ref Range Status   08/26/2022 0.6 (L) 0.8 - 1.7   Final     Alk.  phosphatase   Date Value Ref Range Status   08/26/2022 102 45 - 117 U/L Final     Recent Labs     08/26/22  1415   TP 8.1   ALB 2.9*   GLOB 5.2*   AGRAT 0.6*             CBC w/Diff Recent Labs     08/28/22  0428 08/27/22  0417 08/26/22  1415   WBC 5.5 4.4* 5.4   RBC 3.57* 3.59* 3.45*   HGB 9.4* 9.6* 9.4*   HCT 29.1* 29.1* 27.9*    153 124*   GRANS 68 72 75*   LYMPH 15* 11* 9*   EOS 6* 7* 5          Cardiac Enzymes No results found for: CPK, CK, CKMMB, CKMB, RCK3, CKMBT, CKNDX, CKND1, WILLIE, TROPT, TROIQ, CHANDA, TROPT, TNIPOC, BNP, BNPP     BNP No results found for: BNP, BNPP, XBNPT     Coagulation Recent Labs     08/26/22  1415   PTP 15.9*   INR 1.2           Thyroid  Lab Results   Component Value Date/Time    TSH 2.38 08/29/2021 01:20 AM       No results found for: T4     Urinalysis Lab Results   Component Value Date/Time    Color YELLOW 03/02/2018 05:41 AM    Appearance CLEAR 03/02/2018 05:41 AM    Specific gravity 1.010 03/02/2018 05:41 AM    pH (UA) 5.0 03/02/2018 05:41 AM    Protein 300 (A) 03/02/2018 05:41 AM    Glucose NEGATIVE  03/02/2018 05:41 AM    Ketone NEGATIVE  03/02/2018 05:41 AM    Bilirubin NEGATIVE  03/02/2018 05:41 AM    Urobilinogen 0.2 03/02/2018 05:41 AM    Nitrites NEGATIVE  03/02/2018 05:41 AM    Leukocyte Esterase NEGATIVE  03/02/2018 05:41 AM    Epithelial cells FEW 03/02/2018 05:41 AM    Bacteria FEW (A) 03/02/2018 05:41 AM    WBC 0 to 2 03/02/2018 05:41 AM    RBC NEGATIVE  03/02/2018 05:41 AM        Micro  Recent Labs     08/27/22  1036 08/27/22  1035   CULT NO GROWTH AFTER 21 HOURS NO GROWTH AFTER 21 HOURS     Recent Labs     08/27/22  1036 08/27/22  1035   CULT NO GROWTH AFTER 21 HOURS NO GROWTH AFTER 21 HOURS          Culture data during this hospitalization. All Micro Results       Procedure Component Value Units Date/Time    CULTURE, BLOOD [[id number]] Collected: 08/27/22 1035    Order Status: Completed Specimen: Blood Updated: 08/28/22 0801     Special Requests: NO SPECIAL REQUESTS        Culture result: NO GROWTH AFTER 21 HOURS       CULTURE, BLOOD [[id number]] Collected: 08/27/22 1036    Order Status: Completed Specimen: Blood Updated: 08/28/22 0801     Special Requests: NO SPECIAL REQUESTS        Culture result: NO GROWTH AFTER 21 HOURS       COVID-19 RAPID TEST [[id number]] Collected: 08/27/22 1550    Order Status: Completed Specimen: Nasopharyngeal Updated: 08/27/22 1615     Specimen source Nasopharyngeal        COVID-19 rapid test Not detected        Comment: Rapid Abbott ID Now       Rapid NAAT:  The specimen is NEGATIVE for SARS-CoV-2, the novel coronavirus associated with COVID-19. Negative results should be treated as presumptive and, if inconsistent with clinical signs and symptoms or necessary for patient management, should be tested with an alternative molecular assay. Negative results do not preclude SARS-CoV-2 infection and should not be used as the sole basis for patient management decisions. This test has been authorized by the FDA under an Emergency Use Authorization (EUA) for use by authorized laboratories. Fact sheet for Healthcare Providers: ConventionUpdate.co.nz  Fact sheet for Patients: ConventionUpdate.co.nz       Methodology: Isothermal Nucleic Acid Amplification                  ECHO 6/22/2022 Interpretation Summary  Result status: Final result     Left Ventricle: Normal left ventricular systolic function with a visually estimated EF of 55 - 60%. Left ventricle size is normal. Moderately increased wall thickness. Normal wall motion. Abnormal diastolic function. Aortic Valve: Tricuspid valve.  Mildly calcified cusp. Left Atrium: Left atrium is mildly dilated. Pericardium: Small (<1 cm) pericardial effusion present. No indication of cardiac tamponade. Unable to assess RVSP due to inadequate or insignificant tricuspid regurgitation. Mitral annular calcification and calcification of the posterior mitral leaflet. There is a mobile probably calcified nodule on the posterior leaflet of the mitral valve. Vegetation cannot be ruled out on this transthoracic study. Consider BRENDEN for further evaluation. Images report reviewed by me:  CT 6/1/2022 (Most Recent)  Results from Hospital Encounter encounter on 06/01/22    CT HEAD WO CONT    Narrative  EXAM: CT head    INDICATION: Confusion. COMPARISON: 8/29/2021    TECHNIQUE: Axial CT imaging of the head was performed without intravenous  contrast. Standard multiplanar coronal and sagittal reformatted images were  obtained and are included in interpretation. One or more dose reduction techniques were used on this CT: automated exposure  control, adjustment of the mAs and/or kVp according to patient size, and  iterative reconstruction techniques. The specific techniques used on this CT  exam have been documented in the patient's electronic medical record. Digital  Imaging and Communications in Medicine (DICOM) format image data are available  to nonaffiliated external healthcare facilities or entities on a secure, media  free, reciprocally searchable basis with patient authorization for at least a  12-month period after this study. _______________    FINDINGS:    BRAIN AND POSTERIOR FOSSA: Right central arden hypodensity, new from prior study. No evidence of acute large vessel transcortical infarct or acute parenchymal  hemorrhage. No midline shift or hydrocephalus. EXTRA-AXIAL SPACES AND MENINGES: There are no abnormal extra-axial fluid  collections. CALVARIUM: Intact. SINUSES: Clear.     OTHER: None.    _______________    Impression  Right central arden hypodensity, new from prior study, age indeterminate infarct. Correlate clinically. CXR reviewed by me:  XR 8/26 (Most Recent). Results from Hospital Encounter encounter on 08/26/22    XR CHEST PORT    Narrative  EXAM: XR CHEST PORT    CLINICAL INDICATION/HISTORY: cp  -Additional: None    COMPARISON: 6/20/2022    TECHNIQUE: Frontal view of the chest    _______________    FINDINGS:    HEART AND MEDIASTINUM: Right IJV dialysis catheter tip at the SVC. Cardiomegaly. LUNGS AND PLEURAL SPACES: Bilateral parenchymal and interstitial opacities. No  pneumothorax. BONY THORAX AND SOFT TISSUES: No acute osseous abnormality    _______________    Impression  Cardiomegaly and pulmonary edema. Please note: Voice-recognition software may have been used to generate this report, which may have resulted in some phonetic-based errors in grammar and contents. Even though attempts were made to correct all the mistakes, some may have been missed, and remained in the body of the document.       David Delgado MD  8/28/2022

## 2022-08-28 NOTE — PROGRESS NOTES
Assessment:     Jatinder Turpin is a 50y.o. year old maleh/o ESRD on HD , non compliant with dialysis /skip multiple dialysis session every month despite knowing the risk was sent from dialysis unit because of very high blood pressure . BP in ED was 233/109   CXR showed pulmonary edema         Hypertensive urgency   Pulmonary edema  ESRD  Non compliance  Hyperphosphatemia      Plan:  No neuro symptoms from HTN so could have lower BP goal. Titrate SBP to 150-160 range. Next HD on Monday           CC: Hypertensive emergencies     Subjective:   No change since i saw pt last. No new symptoms or issues. Review of Systems:  Negative for Edema, SOB        Blood pressure 139/70, pulse 99, temperature 98.3 °F (36.8 °C), resp. rate 28, height 5' 7\" (1.702 m), weight 77.6 kg (171 lb), SpO2 98 %.       awake and alert   NAD      Intake/Output Summary (Last 24 hours) at 8/28/2022 1120  Last data filed at 8/28/2022 0700  Gross per 24 hour   Intake 1370.41 ml   Output 100 ml   Net 1270.41 ml      Recent Labs     08/28/22  0428   WBC 5.5     Lab Results   Component Value Date/Time    Sodium 139 08/28/2022 04:28 AM    Potassium 4.3 08/28/2022 04:28 AM    Chloride 107 08/28/2022 04:28 AM    CO2 23 08/28/2022 04:28 AM    Anion gap 9 08/28/2022 04:28 AM    Glucose 83 08/28/2022 04:28 AM    BUN 49 (H) 08/28/2022 04:28 AM    Creatinine 7.89 (H) 08/28/2022 04:28 AM    BUN/Creatinine ratio 6 (L) 08/28/2022 04:28 AM    GFR est AA 9 (L) 08/28/2022 04:28 AM    GFR est non-AA 7 (L) 08/28/2022 04:28 AM    Calcium 8.2 (L) 08/28/2022 04:28 AM        Current Facility-Administered Medications   Medication Dose Route Frequency Provider Last Rate Last Admin    loperamide (IMODIUM) capsule 2 mg  2 mg Oral Q12H PRN Endy Reno MD   2 mg at 08/28/22 1059    diphenhydrAMINE (BENADRYL) injection 12.5 mg  12.5 mg IntraVENous Q6H PRN Ludivina Watson MD   12.5 mg at 08/28/22 0135    meropenem (MERREM) 500 mg in 0.9% sodium chloride (MBP/ADV) 50 mL MBP  0.5 g IntraVENous Q12H Devin Thompson  mL/hr at 08/28/22 0928 500 mg at 08/28/22 0928    linezolid in dextrose 5% (ZYVOX) IVPB premix in D5W 600 mg  600 mg IntraVENous Q12H Devin Thompson  mL/hr at 08/28/22 0846 600 mg at 08/28/22 0846    niCARdipine (CARDENE) 25 mg in 0.9% sodium chloride 250 mL infusion  5-15 mg/hr IntraVENous TITRATE Mojgan Kurtz MD   Held at 08/28/22 0300    sevelamer carbonate (RENVELA) tab 1,600 mg  1,600 mg Oral TID WITH MEALS José Alston MD   1,600 mg at 08/28/22 1100    doxercalciferoL (HECTOROL) 4 mcg/2 mL injection 4 mcg  4 mcg IntraVENous DIALYSIS MON, WED & José Dempsey MD        aspirin delayed-release tablet 81 mg  81 mg Oral DAILY London Finch MD   81 mg at 08/28/22 0845    isosorbide mononitrate ER (IMDUR) tablet 60 mg  60 mg Oral DAILY London Finch MD   60 mg at 08/28/22 0845    calcitRIOL (ROCALTROL) capsule 0.25 mcg  0.25 mcg Oral Q MON, WED & Tony Ruiz MD   0.25 mcg at 08/26/22 2342    sodium bicarbonate tablet 650 mg  650 mg Oral TID London Finch MD   650 mg at 08/28/22 0845    hydrALAZINE (APRESOLINE) 20 mg/mL injection 10 mg  10 mg IntraVENous Q6H PRN London Finch MD   10 mg at 08/28/22 1103    sodium chloride (NS) flush 5-40 mL  5-40 mL IntraVENous Q8H London Finch MD   10 mL at 08/27/22 2127    sodium chloride (NS) flush 5-40 mL  5-40 mL IntraVENous PRN London Finch MD        acetaminophen (TYLENOL) tablet 650 mg  650 mg Oral Q6H PRN London Finch MD   650 mg at 08/27/22 2155    Or    acetaminophen (TYLENOL) suppository 650 mg  650 mg Rectal Q6H PRN London Finch MD        bisacodyL (DULCOLAX) suppository 10 mg  10 mg Rectal DAILY PRN London Finch MD        promethazine (PHENERGAN) tablet 12.5 mg  12.5 mg Oral Q6H PRN London Finch MD   12.5 mg at 08/27/22 2155    Or    ondansetron (ZOFRAN) injection 4 mg  4 mg IntraVENous Q6H PRN London Finch MD        heparin (porcine) injection 5,000 Units  5,000 Units SubCUTAneous Q8H London Finch MD   5,000 Units at 08/28/22 0846    amLODIPine (NORVASC) tablet 10 mg  10 mg Oral DAILY Bony Michel MD   10 mg at 08/28/22 0845    losartan (COZAAR) tablet 100 mg  100 mg Oral DAILY Bony Michel MD   100 mg at 08/28/22 0845    pantoprazole (PROTONIX) tablet 40 mg  40 mg Oral ACB Bony Michel MD   40 mg at 08/28/22 0743    heparin (porcine) 1,000 unit/mL injection 3,200 Units  3,200 Units Hemodialysis DIALYSIS CONTINUOUS Kyle Caban MD   3,200 Units at 08/26/22 9062

## 2022-08-28 NOTE — PROGRESS NOTES
TRANSFER - OUT REPORT:    Verbal report given to Corwin Zelaya RN(name) on Tawny Mejia  being transferred to [facility]) for change in patient condition(downfrade)       Report consisted of patients Situation, Background, Assessment and   Recommendations(SBAR). Information from the following report(s) SBAR, Kardex, ED Summary, and Recent Results was reviewed with the receiving nurse. Lines:   Peripheral IV 08/27/22 Anterior;Right Hand (Active)   Site Assessment Clean, dry, & intact 08/28/22 1200   Phlebitis Assessment 0 08/28/22 1200   Infiltration Assessment 0 08/28/22 1200   Dressing Status Clean, dry, & intact 08/28/22 1200   Dressing Type Transparent 08/28/22 1200   Hub Color/Line Status Infusing 08/28/22 1200   Action Taken Open ports on tubing capped 08/28/22 1200   Alcohol Cap Used Yes 08/28/22 1200        Opportunity for questions and clarification was provided. Patient transported with:   Monitor  O2 @ 2 liters  Pt had multiple episodes of watery diarrhea.

## 2022-08-29 LAB
ANION GAP SERPL CALC-SCNC: 8 MMOL/L (ref 3–18)
BASOPHILS # BLD: 0 K/UL (ref 0–0.1)
BASOPHILS NFR BLD: 1 % (ref 0–2)
BUN SERPL-MCNC: 54 MG/DL (ref 7–18)
BUN/CREAT SERPL: 6 (ref 12–20)
CALCIUM SERPL-MCNC: 8.7 MG/DL (ref 8.5–10.1)
CHLORIDE SERPL-SCNC: 107 MMOL/L (ref 100–111)
CO2 SERPL-SCNC: 23 MMOL/L (ref 21–32)
CREAT SERPL-MCNC: 9 MG/DL (ref 0.6–1.3)
DIFFERENTIAL METHOD BLD: ABNORMAL
EOSINOPHIL # BLD: 0.5 K/UL (ref 0–0.4)
EOSINOPHIL NFR BLD: 8 % (ref 0–5)
ERYTHROCYTE [DISTWIDTH] IN BLOOD BY AUTOMATED COUNT: 20.3 % (ref 11.6–14.5)
GLUCOSE BLD STRIP.AUTO-MCNC: 104 MG/DL (ref 70–110)
GLUCOSE BLD STRIP.AUTO-MCNC: 146 MG/DL (ref 70–110)
GLUCOSE BLD STRIP.AUTO-MCNC: 84 MG/DL (ref 70–110)
GLUCOSE BLD STRIP.AUTO-MCNC: 98 MG/DL (ref 70–110)
GLUCOSE SERPL-MCNC: 100 MG/DL (ref 74–99)
HCT VFR BLD AUTO: 31.5 % (ref 36–48)
HGB BLD-MCNC: 10.2 G/DL (ref 13–16)
IMM GRANULOCYTES # BLD AUTO: 0 K/UL (ref 0–0.04)
IMM GRANULOCYTES NFR BLD AUTO: 1 % (ref 0–0.5)
LYMPHOCYTES # BLD: 0.8 K/UL (ref 0.9–3.6)
LYMPHOCYTES NFR BLD: 14 % (ref 21–52)
MAGNESIUM SERPL-MCNC: 2.6 MG/DL (ref 1.6–2.6)
MCH RBC QN AUTO: 26.8 PG (ref 24–34)
MCHC RBC AUTO-ENTMCNC: 32.4 G/DL (ref 31–37)
MCV RBC AUTO: 82.7 FL (ref 78–100)
MONOCYTES # BLD: 0.6 K/UL (ref 0.05–1.2)
MONOCYTES NFR BLD: 9 % (ref 3–10)
NEUTS SEG # BLD: 4.2 K/UL (ref 1.8–8)
NEUTS SEG NFR BLD: 68 % (ref 40–73)
NRBC # BLD: 0 K/UL (ref 0–0.01)
NRBC BLD-RTO: 0 PER 100 WBC
PLATELET # BLD AUTO: 237 K/UL (ref 135–420)
PMV BLD AUTO: 10.2 FL (ref 9.2–11.8)
POTASSIUM SERPL-SCNC: 4.1 MMOL/L (ref 3.5–5.5)
RBC # BLD AUTO: 3.81 M/UL (ref 4.35–5.65)
SODIUM SERPL-SCNC: 138 MMOL/L (ref 136–145)
WBC # BLD AUTO: 6.1 K/UL (ref 4.6–13.2)

## 2022-08-29 PROCEDURE — 83735 ASSAY OF MAGNESIUM: CPT

## 2022-08-29 PROCEDURE — 74011250637 HC RX REV CODE- 250/637: Performed by: INTERNAL MEDICINE

## 2022-08-29 PROCEDURE — 85025 COMPLETE CBC W/AUTO DIFF WBC: CPT

## 2022-08-29 PROCEDURE — 74011250636 HC RX REV CODE- 250/636: Performed by: STUDENT IN AN ORGANIZED HEALTH CARE EDUCATION/TRAINING PROGRAM

## 2022-08-29 PROCEDURE — 97166 OT EVAL MOD COMPLEX 45 MIN: CPT

## 2022-08-29 PROCEDURE — 80048 BASIC METABOLIC PNL TOTAL CA: CPT

## 2022-08-29 PROCEDURE — 74011000250 HC RX REV CODE- 250: Performed by: HOSPITALIST

## 2022-08-29 PROCEDURE — 74011250637 HC RX REV CODE- 250/637: Performed by: STUDENT IN AN ORGANIZED HEALTH CARE EDUCATION/TRAINING PROGRAM

## 2022-08-29 PROCEDURE — 74011250636 HC RX REV CODE- 250/636: Performed by: INTERNAL MEDICINE

## 2022-08-29 PROCEDURE — 74011250636 HC RX REV CODE- 250/636: Performed by: HOSPITALIST

## 2022-08-29 PROCEDURE — 97530 THERAPEUTIC ACTIVITIES: CPT

## 2022-08-29 PROCEDURE — 36415 COLL VENOUS BLD VENIPUNCTURE: CPT

## 2022-08-29 PROCEDURE — 74011250637 HC RX REV CODE- 250/637: Performed by: HOSPITALIST

## 2022-08-29 PROCEDURE — 82962 GLUCOSE BLOOD TEST: CPT

## 2022-08-29 PROCEDURE — 65270000046 HC RM TELEMETRY

## 2022-08-29 PROCEDURE — 90935 HEMODIALYSIS ONE EVALUATION: CPT

## 2022-08-29 PROCEDURE — 97161 PT EVAL LOW COMPLEX 20 MIN: CPT

## 2022-08-29 RX ADMIN — LOPERAMIDE HYDROCHLORIDE 2 MG: 2 CAPSULE ORAL at 18:19

## 2022-08-29 RX ADMIN — SODIUM CHLORIDE, PRESERVATIVE FREE 10 ML: 5 INJECTION INTRAVENOUS at 18:20

## 2022-08-29 RX ADMIN — ISOSORBIDE MONONITRATE 60 MG: 60 TABLET, EXTENDED RELEASE ORAL at 08:42

## 2022-08-29 RX ADMIN — AMLODIPINE BESYLATE 10 MG: 5 TABLET ORAL at 08:42

## 2022-08-29 RX ADMIN — PANTOPRAZOLE SODIUM 40 MG: 40 TABLET, DELAYED RELEASE ORAL at 06:38

## 2022-08-29 RX ADMIN — SEVELAMER CARBONATE 1600 MG: 800 TABLET, FILM COATED ORAL at 08:42

## 2022-08-29 RX ADMIN — HYDRALAZINE HYDROCHLORIDE 10 MG: 20 INJECTION INTRAMUSCULAR; INTRAVENOUS at 00:02

## 2022-08-29 RX ADMIN — LINEZOLID 600 MG: 600 INJECTION, SOLUTION INTRAVENOUS at 08:42

## 2022-08-29 RX ADMIN — DOXERCALCIFEROL 4 MCG: 4 INJECTION, SOLUTION INTRAVENOUS at 13:49

## 2022-08-29 RX ADMIN — ASPIRIN 81 MG: 81 TABLET, COATED ORAL at 08:42

## 2022-08-29 RX ADMIN — SEVELAMER CARBONATE 1600 MG: 800 TABLET, FILM COATED ORAL at 14:48

## 2022-08-29 RX ADMIN — LOSARTAN POTASSIUM 100 MG: 50 TABLET, FILM COATED ORAL at 08:42

## 2022-08-29 RX ADMIN — SODIUM CHLORIDE, PRESERVATIVE FREE 10 ML: 5 INJECTION INTRAVENOUS at 06:38

## 2022-08-29 RX ADMIN — HEPARIN SODIUM 5000 UNITS: 5000 INJECTION INTRAVENOUS; SUBCUTANEOUS at 18:19

## 2022-08-29 RX ADMIN — HEPARIN SODIUM 5000 UNITS: 5000 INJECTION INTRAVENOUS; SUBCUTANEOUS at 01:07

## 2022-08-29 RX ADMIN — LOPERAMIDE HYDROCHLORIDE 2 MG: 2 CAPSULE ORAL at 08:42

## 2022-08-29 RX ADMIN — HEPARIN SODIUM 5000 UNITS: 5000 INJECTION INTRAVENOUS; SUBCUTANEOUS at 08:42

## 2022-08-29 NOTE — PROGRESS NOTES
Problem: Chronic Renal Failure  Goal: *Fluid and electrolytes stabilized  Outcome: Progressing Towards Goal     Problem: Patient Education: Go to Patient Education Activity  Goal: Patient/Family Education  Outcome: Progressing Towards Goal     Problem: Chronic Renal Failure  Goal: *Fluid and electrolytes stabilized  8/29/2022 1114 by Lucila Blankenship RN  Outcome: Progressing Towards Goal  8/29/2022 1114 by Lucila Blankenship RN  Outcome: Progressing Towards Goal

## 2022-08-29 NOTE — PROGRESS NOTES
Hospitalist Progress Note    Patient: Russ Schwarz MRN: [id number]  CSN: [id number]    YOB: 1970  Age: 50 y.o. Sex: male    DOA: 8/26/2022 LOS:  LOS: 3 days          Chief Complaint:    hypertension      Assessment/Plan        A 50 y.o. male with history of hypertension, end-stage renal disease on HD, hyperlipidemia, noncompliance with dialysis , cardiomegaly admitted to  ICU for HTN emergency. Hypertension -now improved  Continue PO meds  Nephrology recommended Norvasc, spironolactone and losartan-prescription noted. Hypoxia, due to pulmonary edema, secondary due to miss hd -noncompliance  Will have HD today again  Minimal 02 in place, no SOB at rest  On empiric merrem, zyvox due to possible pneumonia    ESRD on HD     Anemia -due to esrd , no bleeding reported   Will continue monitoring h/h       Sickle cell trait      GI: chronic diarrhea. imodium    History of Hypoglycemia  Put hypoglycemia protocol      Right BKA, fall precaution     Recent hospitalizations at other facilities-upper GI bleed, other issues      Disposition :  Patient Active Problem List   Diagnosis Code    Iron deficiency anemia D50.9    ESRD (end stage renal disease) on dialysis (HCC) N18.6, Z99.2    HTN (hypertension) I10    Hx of BKA, right (HCC) Z89.511    Non-compliance with renal dialysis ([facility] [address] Z91.15    Hypertension I10    Acute pulmonary edema (HCC) J81.0    Arteriovenous graft infection ([facility] [address] T82. 7XXA    Hypoxia R09.02    Hypertensive emergency I16.1    Cardiomegaly I51.7    Pulmonary infiltrates R91.8    History of COVID-19 Z86.16    Chronic anemia D64.9    Chronic diarrhea K52.9       Subjective:    Denies any new issues today   Had mild SOB after being off 02 he reports, but \"it took while to develop\"  No concerns this am    Review of systems:    Constitutional: denies fevers, chills  Respiratory: denies prod cough  Cardiovascular: denies chest pain, palpitations  Gastrointestinal: denies nausea, vomiting, diarrhea      Vital signs/Intake and Output:  Visit Vitals  BP (!) 174/78 (BP 1 Location: Right arm, BP Patient Position: At rest)   Pulse 98   Temp (!) 49.8 °F (9.9 °C)   Resp 16   Ht 5' 7\" (1.702 m)   Wt 77.6 kg (171 lb)   SpO2 100%   BMI 26.78 kg/m²     Current Shift:  No intake/output data recorded. Last three shifts:  08/27 1901 - 08/29 0700  In: 1200.8 [I.V.:1200.8]  Out: 100 [Urine:100]    Exam:    General: Well developed, alert, NAD, OX3  CVS:Regular rate and rhythm, no M/R/G, S1/S2 heard, no thrill  Lungs:Clear to auscultation bilaterally, no wheezes, rhonchi, or rales  Abdomen: Soft, Nontender, No distention, Normal Bowel sounds  Extremities: right BKA  Neuro:grossly normal , follows commands  Psych:appropriate                Labs: Results:       Chemistry Recent Labs     08/29/22 0111 08/28/22 0428 08/27/22 0417 08/26/22  1415   * 83 78 74    139 140 136   K 4.1 4.3 3.4* 4.1    107 107 102   CO2 23 23 26 21   BUN 54* 49* 36* 85*   CREA 9.00* 7.89* 6.17* 11.50*   CA 8.7 8.2* 8.6 8.9   AGAP 8 9 7 13   BUCR 6* 6* 6* 7*   AP  --   --   --  102   TP  --   --   --  8.1   ALB  --   --   --  2.9*   GLOB  --   --   --  5.2*   AGRAT  --   --   --  0.6*      CBC w/Diff Recent Labs     08/29/22 0111 08/28/22 0428 08/27/22 0417   WBC 6.1 5.5 4.4*   RBC 3.81* 3.57* 3.59*   HGB 10.2* 9.4* 9.6*   HCT 31.5* 29.1* 29.1*    206 153   GRANS 68 68 72   LYMPH 14* 15* 11*   EOS 8* 6* 7*      Cardiac Enzymes No results for input(s): CPK, CKND1, WILLIE in the last 72 hours.     No lab exists for component: CKRMB, TROIP   Coagulation Recent Labs     08/26/22  1415   PTP 15.9*   INR 1.2       Lipid Panel Lab Results   Component Value Date/Time    Cholesterol, total 115 06/02/2022 09:45 AM    HDL Cholesterol 62 (H) 06/02/2022 09:45 AM    LDL, calculated 46 06/02/2022 09:45 AM    VLDL, calculated 7 06/02/2022 09:45 AM    Triglyceride 35 06/02/2022 09:45 AM    CHOL/HDL Ratio 1.9 06/02/2022 09:45 AM      BNP No results for input(s): BNPP in the last 72 hours.    Liver Enzymes Recent Labs     08/26/22  1415   TP 8.1   ALB 2.9*         Thyroid Studies Lab Results   Component Value Date/Time    TSH 2.38 08/29/2021 01:20 AM        Procedures/imaging: see electronic medical records for all procedures/Xrays and details which were not copied into this note but were reviewed prior to creation of Radha Kaur MD

## 2022-08-29 NOTE — PROGRESS NOTES
0730: report given to this nurse from [address] [facility] RN    1689: MD Ria Shannon at the bedside to discuss pt history and dx, assessment completed by MD and tx plan discussed, pt acknowledges  Bonita Downey RN    2034: dialysis in progress  COLTON Benjamin RN

## 2022-08-29 NOTE — PROGRESS NOTES
OCCUPATIONAL THERAPY EVALUATION    Problem: Self Care Deficits Care Plan (Adult)  Goal: *Acute Goals and Plan of Care (Insert Text)  Outcome: Progressing Towards Goal  Note:   FUNCTIONAL STATUS PRIOR TO ADMISSION: Patient was modified independent using a walker for functional mobility. HOME SUPPORT: The patient lived with alone. Initial Occupational Therapy Goals (8/29/2022) Within 7 day(s):  1. Patient will perform perform grooming standing sink level with mod Ifor 5 minutes for increased independence in ADLs. 2. Patient will perform UB dressing with I seated EOB for increased independence with ADLs. 3. Patient will perform LB dressing with mod I & A/E PRN for increased independence with ADLs. 4. Patient will perform all aspects of toileting with mod I for increased independence with ADLs. 5. Patient will perform LE ADLs utilizing body mechanics & adaptive strategies with 1 verbal cue for increased safety in ADLs. 6. Patient will independently apply energy conservation techniques with no verbal cue(s) for increased independence with ADLs. Patient: Carlo Anthony (39 y.o. male)  Date: 8/29/2022  Primary Diagnosis: Hypertensive emergency [I16.1]  ESRD (end stage renal disease) ([facility] [address] [N18.6]  Medically noncompliant [Z91.19]       Precautions:   Fall  PLOF: Pt was mod I with right prosthetic, used walker for mobility    ASSESSMENT :  Based on the objective data described below, the patient presents with decreased activity tolerance and weakness following hypertensive emergency. Pt presents with PT working on forward and lateral steps. Pt is able to don/doff prothesic with supervision/SBA, pt able to perform all seated grooming tasks with no issues. Discussion on safety techniques at home. Educated on use of BSC, still would like nursing present due to lines and leads. Dialysis presents and pt is left supine in bed.  Pt does not have grab bars avaliable in his apartment and would benefit greatly to increase safety and prevent falls. Education: Pt educated on role of OT in acute setting. Patient will benefit from skilled intervention to address the above impairments. Patient's rehabilitation potential is considered to be Excellent  Factors which may influence rehabilitation potential include:   []             None noted  []             Mental ability/status  []             Medical condition  [x]             Home/family situation and support systems  []             Safety awareness  []             Pain tolerance/management  []             Other:      PLAN :  Recommendations and Planned Interventions:   [x]               Self Care Training                  [x]      Therapeutic Activities  [x]               Functional Mobility Training   []      Cognitive Retraining  [x]               Therapeutic Exercises           [x]      Endurance Activities  [x]               Balance Training                    [x]      Neuromuscular Re-Education  []               Visual/Perceptual Training     [x]      Home Safety Training  []               Patient Education                   [x]      Family Training/Education  []               Other (comment):    Frequency/Duration: Patient will be followed by occupational therapy daily to address goals. Discharge Recommendations: None  Further Equipment Recommendations for Discharge: GRAB BARS     SUBJECTIVE:   Patient stated I would like grab bars I get scared sometimes.     OBJECTIVE DATA SUMMARY:     Past Medical History:   Diagnosis Date    Chronic kidney disease     Diabetes ([facility] [address]     Heart failure ([facility] [address]     Hemodialysis patient ([facility] [address]     Hypertension     Sickle cell trait ([facility] [address]      Past Surgical History:   Procedure Laterality Date    HX ORTHOPAEDIC      right BKA 2017    IR DECLOT AV GRAFT PERCUTANEOUS  6/3/2022    IR INSERT NON TUNL CVC OVER 5 YRS  6/1/2022     Barriers to Learning/Limitations: none  Compensate with: visual, verbal, tactile, kinesthetic cues/model    Home Situation: Lives at home alone  Home Situation  Home Environment: Apartment  One/Two Story Residence: One story  Living Alone: Yes  Support Systems:  (Brother)  Patient Expects to be Discharged to[de-identified] Home  Current DME Used/Available at Home: Shower chair, Raised toilet seat, Walker, rollator  Tub or Shower Type: Tub/Shower combination  [x]  Right hand dominant   []  Left hand dominant    Cognitive/Behavioral Status:  Neurologic State: Alert; Appropriate for age  Orientation Level: Oriented X4  Cognition: Appropriate decision making; Appropriate for age attention/concentration; Appropriate safety awareness  Safety/Judgement: Fall prevention    Skin: intacgt  Edema: none noted    Vision/Perceptual:    Tracking: Able to track stimulus in all quadrants w/o difficulty        Coordination: BUE  Coordination: Within functional limits  Fine Motor Skills-Upper: Left Intact; Right Intact    Gross Motor Skills-Upper: Left Intact; Right Intact    Balance:  Sitting: Intact  Standing: With support  Standing - Static: Good  Standing - Dynamic :  (fair+)    Strength: BUE  Strength: Generally decreased, functional      Tone & Sensation: BUE  Tone: Normal      Range of Motion: BUE  AROM: Within functional limits      Functional Mobility and Transfers for ADLs:  Bed Mobility:  Rolling: Modified independent  Supine to Sit: Modified independent  Sit to Supine: Modified independent  Scooting: Modified independent  Transfers:  Sit to Stand: Supervision     Bed to Chair: Supervision          Toilet Transfer : Contact guard assistance           Assistive Device: Walker, rolling;Prosthetic device        ADL Assessment:   Feeding: Independent    Oral Facial Hygiene/Grooming: Independent    Bathing: Modified independent    Upper Body Dressing: Independent    Lower Body Dressing: Stand-by assistance    Toileting: Stand by assistance      ADL Intervention:      Cognitive Retraining  Problem Solving: Awareness of environment  Executive Functions: Managing time;Regulating behavior  Safety/Judgement: Fall prevention    Pain:  Pain level pre-treatment: 0/10   Pain level post-treatment: 0/10   Pain Intervention(s): Medication provided by Nursing (see MAR); Rest, Ice, Repositioning   Response to intervention: Nurse notified, See doc flow sheet    Activiyt tolernace: Good no LOB noted    Please refer to the flowsheet for vital signs taken during this treatment. After treatment:   [] Patient left in no apparent distress sitting up in chair  [x] Patient left in no apparent distress in bed  [x] Call bell left within reach  [x] Nursing notified  [] Caregiver present  [] Bed alarm activated    COMMUNICATION/EDUCATION:   [x] Role of Occupational Therapy in the acute care setting  [x] Home safety education was provided and the patient/caregiver indicated understanding. [x] Patient/family have participated as able in goal setting and plan of care. [x] Patient/family agree to work toward stated goals and plan of care. [] Patient understands intent and goals of therapy, but is neutral about his/her participation. [] Patient is unable to participate in goal setting and plan of care. Thank you for this referral.  Carter Velasquez OTD, OTR/L   Time Calculation: 10 mins    Eval Complexity: History: MEDIUM Complexity : Expanded review of history including physical, cognitive and psychosocial  history ; Examination: MEDIUM Complexity : 3-5 performance deficits relating to physical, cognitive , or psychosocial skils that result in activity limitations and / or participation restrictions; Decision Making:MEDIUM Complexity : Patient may present with comorbidities that affect occupational performnce.  Miniml to moderate modification of tasks or assistance (eg, physical or verbal ) with assesment(s) is necessary to enable patient to complete evaluation

## 2022-08-29 NOTE — PROGRESS NOTES
Bedside and Verbal shift change report given to NAHEED Ordaz (oncoming nurse) by Sarai Benson (offgoing nurse).  Report included the following information SBAR, Kardex, Intake/Output, MAR, Recent Results, and Cardiac Rhythm SR .

## 2022-08-29 NOTE — PROGRESS NOTES
Interdisciplinary Round Note   Patient Information: Patient Information: Pattie Nguyễn                                      337/[birth year]   Reason for Admission: Hypertensive emergency [I16.1]  ESRD (end stage renal disease) ([facility] [address] [N18.6]  Medically noncompliant [Z91.19]   Attending Provider:   Lisa Candelaria MD   Past Medical History:   Past Medical History:   Diagnosis Date    Chronic kidney disease     Diabetes ([facility] [address]     [facility]     Hemodialysis patient ([facility]     Hypertension     Sickle cell trait ([facility]       Hospital day: 3  RRAT Score: High Risk            21 Total Score    3 Has Seen PCP in Last 6 Months (Yes=3, No=0)    4 IP Visits Last 12 Months (1-3=4, 4=9, >4=11)    9 Pt. Coverage (Medicare=5 , Medicaid, or Self-Pay=4)    5 Charlson Comorbidity Score (Age + Comorbid Conditions)        Criteria that do not apply:    . Living with Significant Other. Assisted Living. LTAC. SNF. or   Rehab    Patient Length of Stay (>5 days = 3)      Retired Read Only-Readmit Risk Tool Support Systems:  (Brother), History of Falls Within Past 3 Months: Yes, Needs Assistance with Wound Care AND/OR Mgnt of O2, Nebulizer: No, Requires Financial, Physical and/or Educational Assistance With Medications: No, History of Mental Illness: No, Living Alone:  Yes              VITAL SIGNS  Vitals:    08/28/22 2330 08/29/22 0000 08/29/22 0349 08/29/22 0934   BP: (!) 174/78   (!) 153/63   Pulse:  100 98 89   Resp: 16   16   Temp: (!) 49.8 °F (9.9 °C)   98.6 °F (37 °C)   TempSrc:       SpO2: 100%   100%   Weight:       Height:              Lines, Drains, & Airways    Midline Catheter 08/26/22 Right;Brachial-Site Assessment: Clean, dry, & intact  Hemodialysis Access-Site Assessment: Clean, dry, & intact  Peripheral IV 08/27/22 Anterior;Right Hand-Site Assessment: Clean, dry, & intact      VTE Prophylaxis                                   Intake and Output:   08/27 1901 - 08/29 0700  In: 1200.8 [I.V.:1200.8]  Out: 100 [Urine:100]  08/29 0701 - 08/29 1900  In: 540 [P.O.:240; I.V.:300]  Out: -     Stool Color: Brown  Stool Appearance: Soft  Stool Amount: Medium  Stool Source/Status: Rectum Current Diet: ADULT DIET Regular; Low Potassium (Less than 3000 mg/day); Low Phosphorus (Less than 1000 mg)       Abdominal   Last Bowel Movement Date: 08/29/22  Stool Appearance: Soft  Appetite: Good  Bowel Sounds: Active   Nutrition  Chewing/Swallowing Problems: No  Difficulty with Secretions: No  Speech Slurred/Thick/Garbled: No     Recent Glucose Results:   Lab Results   Component Value Date/Time     (H) 08/29/2022 01:11 AM    GLUCPOC 84 08/29/2022 06:55 AM    GLUCPOC 104 08/29/2022 12:07 AM    GLUCPOC 76 08/28/2022 03:33 PM        Sepsis Re-Assessment Documentation:     Date: 8/29/2022   Time: 10:42 AM  Lactic Acid level:      Vital Signs  Level of Consciousness: Alert (0)  Temp: 98.6 °F (37 °C)  Temp Source: Oral  Pulse (Heart Rate): 89  Heart Rate Source: Monitor  Cardiac Rhythm: Sinus Rhythm  Resp Rate: 16  BP: (!) 153/63  MAP (Monitor): 95  MAP (Calculated): 93  BP 1 Location: Right upper arm  BP 1 Method: Automatic  BP Patient Position: At rest  MEWS Score: 1      Vitals:    08/28/22 2330 08/29/22 0000 08/29/22 0349 08/29/22 0934   BP: (!) 174/78   (!) 153/63   Pulse:  100 98 89   Resp: 16   16   Temp: (!) 49.8 °F (9.9 °C)   98.6 °F (37 °C)   TempSrc:       SpO2: 100%   100%      Vitals:    08/28/22 2330 08/29/22 0000 08/29/22 0349 08/29/22 0934   BP: (!) 174/78   (!) 153/63   Pulse:  100 98 89   Resp: 16   16   Temp: (!) 49.8 °F (9.9 °C)   98.6 °F (37 °C)   TempSrc:       SpO2: 100%   100%               Activity Level: Activity Level: Bath Room Privileges   Current Immunizations:  Immunization History   Administered Date(s) Administered    Influenza Vaccine 03/18/2015, 12/01/2016    Pneumococcal Polysaccharide (PPSV-23) 01/27/2015    Tdap 01/27/2015      Recommendations:     IV Antibiotics; Real Mcdaniel.     D/C Plan: Home with HH vs. Home depending upon therapy recommendations. PT/OT evals currently pending. COVID status: ESBL     Will the patient require COVID testing prior to discharge for placement?: YES      Interdisciplinary team rounds were held on 10:32 AM with the following team members Bedside RN, floor care manager, care mgmt supervisor, CNS, PT, pharmacy. Plan of care discussed. See clinical pathway and/or care plan for interventions and desired outcomes.

## 2022-08-29 NOTE — PROGRESS NOTES
Problem: Mobility Impaired (Adult and Pediatric)  Goal: *Acute Goals and Plan of Care (Insert Text)  Description: Physical Therapy Goals  Initiated 8/29/2022 and to be accomplished within 7 day(s)  1. Patient will move from supine to sit and sit to supine  in bed with independence. 2.  Patient will transfer from bed to chair and chair to bed with modified independence using the least restrictive device. 3.  Patient will perform sit to stand with modified independence. 4.  Patient will ambulate with modified independence for 150 feet with the least restrictive device. PLOF: lives alone, ambulates with rollator and prosthesis on right LE, pt reports ambulating in community but not extensive distances     Outcome: Progressing Towards Goal   PHYSICAL THERAPY EVALUATION    Patient: Jr Winslow (50 y.o. male)  Date: 8/29/2022  Primary Diagnosis: Hypertensive emergency [I16.1]  ESRD (end stage renal disease) ([facility] [address] [N18.6]  Medically noncompliant [Z91.19]       Precautions: right BKA, fall       PLOF: see above    ASSESSMENT :  Based on the objective data described below, the patient presents with decreased strength, balance and activity tolerance resulting in decreased independence with functional mobility. Pt is currently at a supervision/mod I level with ambulation and transfers using a RW for short distances in his room. Pt will benefit from continued PT services to return to PLOF. Patient will benefit from skilled intervention to address the above impairments.   Patient's rehabilitation potential is considered to be Good  Factors which may influence rehabilitation potential include:   []         None noted  []         Mental ability/status  [x]         Medical condition  []         Home/family situation and support systems  []         Safety awareness  []         Pain tolerance/management  []         Other:      PLAN :  Recommendations and Planned Interventions:   []           Bed Mobility Training []    Neuromuscular Re-Education  [x]           Transfer Training                   [x]    Orthotic/Prosthetic Training  [x]           Gait Training                          []    Modalities  [x]           Therapeutic Exercises           []    Edema Management/Control  [x]           Therapeutic Activities            []    Family Training/Education  [x]           Patient Education  []           Other (comment):    Frequency/Duration: Patient will be followed by physical therapy 1-2 times per day/4-7 days per week to address goals. Further Equipment Recommendations for Discharge: N/A    AMPAC: Based on an AM-PAC score of 16/20  omitting stairs and their current functional mobility deficits, it is recommended that the patient have 2-3 sessions per week of Physical Therapy at d/c to increase the patient's independence. An AMPAC score of 14 or greater is associated with discharge to home. This AMPAC score should be considered in conjunction with interdisciplinary team recommendations to determine the most appropriate discharge setting. Patient's social support, diagnosis, medical stability, and prior level of function should also be taken into consideration. SUBJECTIVE:   Patient stated It feels good to move, I've wanted to get up.     OBJECTIVE DATA SUMMARY:     Past Medical History:   Diagnosis Date    Chronic kidney disease     Diabetes ([facility] [address]     Heart failure ([facility] [address]     Hemodialysis patient ([facility] [address]     Hypertension     Sickle cell trait ([facility] [address]      Past Surgical History:   Procedure Laterality Date    HX ORTHOPAEDIC      right BKA 2017    IR DECLOT AV GRAFT PERCUTANEOUS  6/3/2022    IR INSERT NON TUNL CVC OVER 5 YRS  6/1/2022     Barriers to Learning/Limitations: None  Compensate with: N/A  Home Situation:  Home Situation  Home Environment: Apartment  One/Two Story Residence: One story  Living Alone: Yes  Support Systems:  (Brother)  Patient Expects to be Discharged to[de-identified] Home  Current DME Used/Available at Home: [address], rollator  Critical Behavior:  Neurologic State: Alert  Orientation Level: Oriented X4  Cognition: Follows commands         Tone & Sensation:   Tone: Normal       Range Of Motion:  AROM: Within functional limits      Functional Mobility:  Bed Mobility:  Rolling: Modified independent  Supine to Sit: Modified independent  Sit to Supine: Modified independent  Scooting: Modified independent  Transfers:  Sit to Stand: Supervision  Stand to Sit: Supervision     Balance:   Sitting: Intact  Standing: With support  Standing - Static: Good  Standing - Dynamic :  (fair+)  Ambulation/Gait Training:  Distance (ft): 18 Feet (ft) (8)  Assistive Device: Walker, rolling;Prosthetic device  Ambulation - Level of Assistance: Supervision  Gait Abnormalities: Decreased step clearance  Therapeutic Exercises: Ankle pumps x5 on left  Pain:  Pain level pre-treatment: 0/10   Pain level post-treatment: 0/10   Pain Intervention(s) : n/a    Activity Tolerance:   fair  Please refer to the flowsheet for vital signs taken during this treatment. After treatment:   []         Patient left in no apparent distress sitting up in chair  [x]         Patient left in no apparent distress sitting edge of bed with OT present  [x]         Call bell left within reach  []         Nursing notified  []         Caregiver present  []         Bed alarm activated  []         SCDs applied    COMMUNICATION/EDUCATION:   [x]         Role of Physical Therapy in the acute care setting. [x]         Fall prevention education was provided and the patient/caregiver indicated understanding. [x]         Patient/family have participated as able in goal setting and plan of care. [x]         Patient/family agree to work toward stated goals and plan of care. []         Patient understands intent and goals of therapy, but is neutral about his/her participation.   []         Patient is unable to participate in goal setting/plan of care: ongoing with therapy staff.  []         Other: Thank you for this referral.  Kya Gottlieb, PT   Time Calculation: 25 mins      Eval Complexity: History: HIGH Complexity :3+ comorbidities / personal factors will impact the outcome/ POC Exam:MEDIUM Complexity : 3 Standardized tests and measures addressing body structure, function, activity limitation and / or participation in recreation  Presentation: MEDIUM Complexity : Evolving with changing characteristics  Clinical Decision Making:Medium Complexity    Overall Complexity:MEDIUM    [facility] AM-PAC® Basic Mobility Inpatient Short Form (6-Clicks) Version 2    How much HELP from another person does the patient currently need    (If the patient hasn't done an activity recently, how much help from another person do you think he/she would need if he/she tried?)   Total (Total A or Dep)   A Lot  (Mod to Max A)   A Little (Sup or Min A)   None (Mod I to I)   Turning from your back to your side while in a flat bed without using bedrails? [] 1 [] 2 [] 3 [x] 4   2. Moving from lying on your back to sitting on the side of a flat bed without using bedrails? [] 1 [] 2 [x] 3 [] 4   3. Moving to and from a bed to a chair (including a wheelchair)? [] 1 [] 2 [x] 3 [] 4   4. Standing up from a chair using your arms (e.g., wheelchair, or bedside chair)? [] 1 [] 2 [x] 3 [] 4   5. Walking in hospital room? [] 1 [] 2 [x] 3 [] 4   6. Climbing 3-5 steps with a railing?+   [] 1 [] 2 [] 3 [] 4   +If stair climbing cannot be assessed, skip item #6. Sum responses from items 1-5.

## 2022-08-29 NOTE — DIALYSIS
TREATMENT SUMMARY   Patient dialyzed in room 337  Tolerated treatment well without complaint or complications.    RIJ TDC functioning well without complication of BFR or accessing       3500 ml removed via UF with a with a net removal 3000 ml  Report given to Nae Warner RN with all questions answered     TREATMENT  NOTES                                                                                                     ACUTE HEMODIALYSIS FLOW SHEET       ACCESS   CATHETER ACCESS: [] N/A  [x] RIGHT  [] LEFT  [x] IJ  [] SUBCL [] FEM                    [] First use X-ray  [x] Tunnel     [] Non-Tunneled      [x] No S/S infection  [] Redness [] Drainage  [] Cultured [] Swelling [] Pain                    [x] Medical Aseptic [x] Prep Dressing Changed                  [] Clotted [] Patent []      Flows: [x] Good [] Poor [] Reversed                 If Access Problem Dr. Bella Rosales: [] Yes [] No    Date:_____  [] N/A[]   GRAFT/FISTULA ACCESS:  [x] N/A  [] RIGHT  [] LEFT  [] UE   [] LE       [] AVG  [] AVF [] BUTTONHOLE    [] +BRUIT/THRILL [] MEDICAL ASEPTIC PREP     [] No S/S infection  [] Redness [] Drainage  [] Cultured [] Swelling [] Pain              If Access Problem Dr. Bella Rosales: [] Yes [] No    Date:______ [] N/A     RO/HEMODIAYLSIS MACHINE SAFETY CHECKS- 2 [facility] [place] TREATMENT          [] [facility]: Machine Serial #1:  [id number]    RO Serial #[id number]                       [] [facility]: Machine Serial #2:  [id number] RO Serial #[id number]     [x] [facility]: Machine Serial #3:  [id number]  RO Serial #[id number]    Alarm Test: [x] Pass  Time_1012__  [x] RO/Machine Log Complete    [x] Extracorporeal circuit Tested for integrity           Dialyzer_[id number]_   Tubing__22A29-11_    Dialysate: pH__7.4_  Temp.__37___Conductivity: Meter __14__ HD Machine__13.8_   CHLORINE TESTING- BEFORE EACH TREATMENT AND EVERY 4 HOURS   Total Chlorine: [x] Less than 0.1 ppm Time:_1012__2nd Check Time:___NR___  (If greater than 0.1 ppm from Primary then every 30 minutes from Secondary)   TREATMENT INIATION-WITH DIALYSIS PRECAUTIONS   [x] All Connections Secured   [x] Saline Line Double Clamped    [x] Venous Parameters Set [x] Arterial Parameters Set    [x] Prime Given 250 ml     [x] Air Foam Detector Engaged   PRE-TREATMENT   UF Calculations: Wt to lose:__3000__ml(+) Oral:_0_ml(+)IV Meds/Fluids/Blood prods_0__ml(+) Prime/Rinse__500_ml(=)Total UF Goal___3500_mL     Tx Initiation Note: RECEIVED REPORT. PATIENT ALERT AND ORIENTED. VITAL SIGNS WNL. NAD. ALL QUESTIONS ANSWERED WITH PATIENT  SAFETY CHECKS COMPLETE. TIME OUT COMPLETE. TREATMENT INITIATED VIA _[facility] TDC_. NO CONCERNS NOTED.     [x] Time Out/Safety Check  Time:__1020___     Dialyzer cleared: [x] Good [] Fair [] Poor     Blood Volume Processed __73.5__L   Net UF Removed __3000__mL  Post Tx Access:                  AVF/AVG: Bleeding Stop Time      Art._na_min Chaim._na__min []+bruit/thrill                              Catheter: Locking Solution  [x] Heparin 1 ml/1000 units                                                             [] Normal Saline                                                                      Art.__1.8___ ml Chaim.__1.8___ml                                                           [x] New caps placed       Abbreviations: AVG-arterial venous graft, AVF-arterial venous fistula, IJ-Internal Jugular,  Subcl-Subclavian, Fem-Femoral, Tx-treatment, AP/HR-apical heart rate, DFR-dialysate flow rate, BFR-blood flow rate, AP-arterial pressure, -venous pressure, UF-ultrafiltrate, TMP-transmembrane pressure, Chaim-Venous, Art-Arterial, RO-Reverse Osmosis

## 2022-08-29 NOTE — PROGRESS NOTES
Bedside and Verbal shift change report given to [facility]. RN (oncoming nurse) by Aba Mancini (offgoing nurse).  Report included the following information SBAR, Kardex, Intake/Output, MAR, Recent Results, and Cardiac Rhythm SR .

## 2022-08-29 NOTE — PROGRESS NOTES
RENAL CONSULT PROGRESS NOTE   2022    Patient:  Maria Khan  :  1970  Gender:  male  MRN #:  [id number]    Reason for Consult: Hypertensive urgency and pulmonary edema ,ESRD     Subjective:   Says he is doing fine   Denied nausea and headache  BP improved     Objective:    Visit Vitals  BP (!) 141/74   Pulse 83   Temp 99.3 °F (37.4 °C) (Oral)   Resp 18   Ht 5' 7\" (1.702 m)   Wt 77.6 kg (171 lb)   SpO2 100%   BMI 26.78 kg/m²       Physical Exam:    Pt awake,  alert and comfortable  HEENT: No JVD, anicteric sclera, no neck swelling  Lung: clear to auscultation  Ext- right BKA, no edema in LLE   CNS- Oriented to time , place and person     Laboratory Data:    Lab Results   Component Value Date    BUN 54 (H) 2022    BUN 49 (H) 2022    BUN 36 (H) 2022     2022     2022     2022    CO2 23 2022    CO2 23 2022    CO2 26 2022     Lab Results   Component Value Date    WBC 6.1 2022    HGB 10.2 (L) 2022    HCT 31.5 (L) 2022     No components found for: CALCIUM, PHOSPHORUS, MAGNESIUM  Lab Results   Component Value Date    HDL 62 (H) 2022     No results found for: SPECIMENTYP, TURBIDITY, UGLU    Imaging Reveiwed:    CXR: Cardiomegaly and pulmonary edema.       Assessment:    Maria Khan is a 50y.o. year old maleh/o ESRD on HD , non compliant with dialysis  admitted for hypertensive urgency   At present clinically not in fluid overload and BP improved     Hypertension   ESRD  Anemia of CKD   Hyperphosphatemia   Secondary Hyperparathyroidism     Plan:      Hypertension- BP at target range this morning   Continue amlodipine 10 mg  and losartan 100 mg , resume spironolactone 100 mg daily post dialysis if BP remained high     ESRD-   Dialysis today , UF 3.5 kg  Next dialysis on Wednesday     Anemia- EVIE as indicated     Bone mineral disease  : his phosphorous was 11 this month- continue sevelamer 1600 mg TID   Has secondary hyperparathyroidism   hectoral 4 mcg with on dialysis day            Erica Rudolph MD, MD  [facility], [facility] Nephrology

## 2022-08-29 NOTE — CONSULTS
[facility]  (A Division of [address] [place] [address])                                                                                                                      Jorge Saenz MD  Office #: - Option # [phone]  Fax #: [phone]     Date of Admission: 8/26/2022Date of Note: 8/29/2022      Reason for Referral: Evaluation and antibiotic management of possible endocarditis. Thank you for involving me in the care of this patient. Please do not hesitate to contact me on the above number if question or concern. 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 TTE 8/28 with MV calcification on posterior side note-- which was site of veg on prior TTE 6/2022. EF is preserved    --best to monitor patient off abx at this time, and BP control  --will follow closely off abx  --he is high risk for C.diff-- need to monitor    Will follow with you       HPI:  Caryle Porto is a 50 y.o.  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 AVG is excised completely at that time and he was treated with IV cefazolin with Renal team at HD until 8/4/22. He was admitted from 8/3 to 8/8/22 at [facility], due to fall/ syncope and hematemsis. Briefly intubated. He had E.cloace growth from resp cutlure 8/3-- that was treated with abx, and with Bactrim, states he was not on oral ABX on discharge. He was doing his routine HD when his renal MD decided to send him to ED due to hypertensive urgency/ pul edema on 8/27/22. He denies F/C/R/V/V or chest pain. Was with high BP in ED and saturating 87%. He was placed on meropenem and linezolid after evaluation. Active Hospital Problems    Diagnosis Date Noted    Chronic diarrhea 08/28/2022    Cardiomegaly 08/27/2022    Pulmonary infiltrates 08/27/2022    History of COVID-19 08/27/2022    Chronic anemia 08/27/2022    Hypoxia 08/26/2022    Hypertensive emergency 08/26/2022    Acute pulmonary edema ([address] 06/20/2022    Non-compliance with renal dialysis ([address] 08/27/2021    ESRD (end stage renal disease) on dialysis ([address] 01/29/2020    Hx of BKA, right ([address] 01/29/2020    HTN (hypertension) 01/29/2020     Past Medical History:   Diagnosis Date    Chronic kidney disease     Diabetes ([address]     Heart failure ([address]     Hemodialysis patient ([address]     Hypertension     Sickle cell trait ([address]      Past Surgical History:   Procedure Laterality Date    HX ORTHOPAEDIC      right BKA 2017    IR DECLOT AV GRAFT PERCUTANEOUS  6/3/2022    IR INSERT NON TUNL CVC OVER 5 YRS  6/1/2022     History reviewed. No pertinent family history. Social History     Socioeconomic History    Marital status: LEGALLY      Spouse name: Not on file    Number of children: Not on file    Years of education: Not on file    Highest education level: Not on file   Occupational History    Not on file   Tobacco Use    Smoking status: Former    Smokeless tobacco: Never   Substance and Sexual Activity    Alcohol use:  No Drug use: No    Sexual activity: Not on file   Other Topics Concern    Not on file   Social History Narrative    Not on file     Social Determinants of Health     Financial Resource Strain: Not on file   Food Insecurity: Not on file   Transportation Needs: Not on file   Physical Activity: Not on file   Stress: Not on file   Social Connections: Not on file   Intimate Partner Violence: Not on file   Housing Stability: Not on file       Allergies:  Penicillins and Vancomycin     Medications:  Current Facility-Administered Medications   Medication Dose Route Frequency    loperamide (IMODIUM) capsule 2 mg  2 mg Oral Q12H PRN    diphenhydrAMINE (BENADRYL) injection 12.5 mg  12.5 mg IntraVENous Q6H PRN    meropenem (MERREM) 500 mg in 0.9% sodium chloride (MBP/ADV) 50 mL MBP  0.5 g IntraVENous Q12H    linezolid in dextrose 5% (ZYVOX) IVPB premix in D5W 600 mg  600 mg IntraVENous Q12H    sevelamer carbonate (RENVELA) tab 1,600 mg  1,600 mg Oral TID WITH MEALS    doxercalciferoL (HECTOROL) 4 mcg/2 mL injection 4 mcg  4 mcg IntraVENous DIALYSIS MON, WED & FRI    aspirin delayed-release tablet 81 mg  81 mg Oral DAILY    isosorbide mononitrate ER (IMDUR) tablet 60 mg  60 mg Oral DAILY    hydrALAZINE (APRESOLINE) 20 mg/mL injection 10 mg  10 mg IntraVENous Q6H PRN    sodium chloride (NS) flush 5-40 mL  5-40 mL IntraVENous Q8H    sodium chloride (NS) flush 5-40 mL  5-40 mL IntraVENous PRN    acetaminophen (TYLENOL) tablet 650 mg  650 mg Oral Q6H PRN    Or    acetaminophen (TYLENOL) suppository 650 mg  650 mg Rectal Q6H PRN    bisacodyL (DULCOLAX) suppository 10 mg  10 mg Rectal DAILY PRN    promethazine (PHENERGAN) tablet 12.5 mg  12.5 mg Oral Q6H PRN    Or    ondansetron (ZOFRAN) injection 4 mg  4 mg IntraVENous Q6H PRN    heparin (porcine) injection 5,000 Units  5,000 Units SubCUTAneous Q8H    amLODIPine (NORVASC) tablet 10 mg  10 mg Oral DAILY    losartan (COZAAR) tablet 100 mg  100 mg Oral DAILY    pantoprazole (PROTONIX) tablet 40 mg  40 mg Oral ACB    heparin (porcine) 1,000 unit/mL injection 3,200 Units  3,200 Units Hemodialysis DIALYSIS CONTINUOUS        ROS:  Pertinent items are noted in the History of Present Illness. Physical Exam:    Temp (24hrs), Av.7 °F (31.5 °C), Min:49.8 °F (9.9 °C), Max:99.3 °F (37.4 °C)    Visit Vitals  BP (!) (P) 166/70   Pulse (P) 89   Temp 99.3 °F (37.4 °C) (Oral)   Resp (P) 18   Ht 5' 7\" (1.702 m)   Wt 77.6 kg (171 lb)   SpO2 100%   BMI 26.78 kg/m²          GEN: WD chronically sick looking, on 2L NC-not in resp distress. HEENT: Unicteric. EOMI intac  --no neck swelling  --R chest TDC-- with ongoing HD  CHEST: Non laboured breathing. CTA anteriorly  CVS:RRR, no mur/gallop  ABD: Obese/soft. Non tender. Non distended. ABS  YOVANY: Deferred  EXT: No apparent swelling or redness on UE/LE joints. --R BKA  Skin: Dry and intact. No rash, no redness. no peripheral sign of IE  CNS: A, OX3. Moves all extremity. CN grossly ok. Microbiology  All Micro Results       Procedure Component Value Units Date/Time    CULTURE, BLOOD [[id number]] Collected: 22    Order Status: Completed Specimen: Blood Updated: 22     Special Requests: NO SPECIAL REQUESTS        Culture result: NO GROWTH 2 DAYS       CULTURE, BLOOD [[id number]] Collected: 22 1036    Order Status: Completed Specimen: Blood Updated: 22     Special Requests: NO SPECIAL REQUESTS        Culture result: NO GROWTH 2 DAYS       COVID-19 RAPID TEST [[id number]] Collected: 22 1550    Order Status: Completed Specimen: Nasopharyngeal Updated: 22 1615     Specimen source Nasopharyngeal        COVID-19 rapid test Not detected        Comment: Rapid Abbott ID Now       Rapid NAAT:  The specimen is NEGATIVE for SARS-CoV-2, the novel coronavirus associated with COVID-19.        Negative results should be treated as presumptive and, if inconsistent with clinical signs and symptoms or necessary for patient management, should be tested with an alternative molecular assay. Negative results do not preclude SARS-CoV-2 infection and should not be used as the sole basis for patient management decisions. This test has been authorized by the FDA under an Emergency Use Authorization (EUA) for use by authorized laboratories. Fact sheet for Healthcare Providers: ConventionUpdate.co.nz  Fact sheet for Patients: ConventionUpdate.co.nz       Methodology: Isothermal Nucleic Acid Amplification                   Lab results:    Chemistry  Recent Labs     08/29/22 0111 08/28/22 0428 08/27/22 0417 08/26/22  1415   * 83 78 74    139 140 136   K 4.1 4.3 3.4* 4.1    107 107 102   CO2 23 23 26 21   BUN 54* 49* 36* 85*   CREA 9.00* 7.89* 6.17* 11.50*   CA 8.7 8.2* 8.6 8.9   AGAP 8 9 7 13   BUCR 6* 6* 6* 7*   AP  --   --   --  102   TP  --   --   --  8.1   ALB  --   --   --  2.9*   GLOB  --   --   --  5.2*   AGRAT  --   --   --  0.6*       CBC w/ Diff  Recent Labs     08/29/22  0111 08/28/22 0428 08/27/22 0417   WBC 6.1 5.5 4.4*   RBC 3.81* 3.57* 3.59*   HGB 10.2* 9.4* 9.6*   HCT 31.5* 29.1* 29.1*    206 153   GRANS 68 68 72   LYMPH 14* 15* 11*   EOS 8* 6* 7*       XR CHEST PORT    Result Date: 8/28/2022  EXAM: CHEST RADIOGRAPH CLINICAL INDICATION/HISTORY: CHF vs pneumonia   > Additional: None COMPARISON: 8/26/2022. TECHNIQUE: Portable frontal view of the chest _______________ FINDINGS: SUPPORT DEVICES: Right central venous catheter is stable. HEART AND MEDIASTINUM: Cardiac size is enlarged. Remaining mediastinal contours are stable. LUNGS AND PLEURAL SPACES: Interstitial markings are slightly prominent but improved. No consolidation, mass or effusion. BONY THORAX AND SOFT TISSUES: Unremarkable. _______________     1. Cardiomegaly with slight improvement of pulmonary edema.     XR CHEST PORT    Result Date: 8/26/2022  EXAM: XR CHEST PORT CLINICAL INDICATION/HISTORY: cp -Additional: None COMPARISON: 6/20/2022 TECHNIQUE: Frontal view of the chest _______________ FINDINGS: HEART AND MEDIASTINUM: Right IJV dialysis catheter tip at the SVC. Cardiomegaly. LUNGS AND PLEURAL SPACES: Bilateral parenchymal and interstitial opacities. No pneumothorax. BONY THORAX AND SOFT TISSUES: No acute osseous abnormality _______________     Cardiomegaly and pulmonary edema. ECHO ADULT COMPLETE    Result Date: 8/28/2022  Formatting of this result is different from the original.   Left Ventricle: Normal left ventricular systolic function with a visually estimated EF of 60 - 65%. Left ventricle size is normal. Mildly increased wall thickness. Findings consistent with mild concentric hypertrophy. Normal wall motion. Grade I diastolic dysfunction present with normal LV EF. Left Atrium: Left atrium is mildly dilated. Mitral Valve: Valve structure is normal. Moderate annular calcification at the posterior leaflet of the mitral valve. Mild regurgitation. Underlying vegetation cannot be ruled out   Unable to assess RVSP due to inadequate or insignificant tricuspid regurgitation. Consider Transesophageal echocardiogram if clinically indicated.      Procedures/imaging: see electronic medical records for all procedures/Xrays and details which were not copied into this note but were reviewed prior to creation of Plan

## 2022-08-29 NOTE — PROGRESS NOTES
[facility]  Pulmonary, Critical Care, and Sleep Medicine    Name: Jeannette Dewey MRN: [id number]   : 1970 Hospital: [facility] [place]    Date: 2022  Room: [address] [place] [address] Note                                              Consult requesting physician: Dr. Cassius Sheth  Reason for Consult: HTN Urgency, ESRD on dialysis      IMPRESSION:   Hypertension emergency  End-stage renal disease on hemodialysis  Cardiomegaly  Acute pulmonary edema  Bilateral pulmonary infiltrates  Chronic anemia  Chronic diarrhea  History of MSSA bacteremia/AV graft infection 2022      Patient Active Problem List   Diagnosis Code    Iron deficiency anemia D50.9    ESRD (end stage renal disease) on dialysis ([address] N18.6, Z99.2    HTN (hypertension) I10    Hx of BKA, right ([address] Z89.511    Non-compliance with renal dialysis ([address] Z91.15    Hypertension I10    Acute pulmonary edema ([address] J81.0    Arteriovenous graft infection ([facility] T82. 7XXA    Hypoxia R09.02    Hypertensive emergency I16.1    Cardiomegaly I51.7    Pulmonary infiltrates R91.8    History of COVID-19 Z86.16    Chronic anemia D64.9    Chronic diarrhea K52.9       Code status: Full Code      RECOMMENDATIONS:   Respiratory: Respirations; currently on nasal cannula oxygen at 2 L. Now weaning oxygen to off  Chest x-ray from today reviewed-cardiomegaly present; improved pulmonary edema; right chest wall dialysis catheter; no focal infiltrates or consolidations or significant pleural effusions noted. Keep SPO2 >=92%. HOB 30 degree elevation all the time. Aggressive pulmonary toileting. Aspiration precautions. Incentive spirometry. CVS: on oral hypertensive  BP improving with hd  Patient has come off nicardipine drip. Oral antihypertensives-amlodipine, losartan, isosorbide mononitrate. 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 Troponin-no significant elevation.   ID: spoke to ID will be observed off abx   Chest x-ray shows improvement in pulm edema; no focal consolidations. History of COVID infection in June 2022. Procalcitonin elevated; blood cultures negative so far. Rapid COVID test-negative. Patient allergic to penicillin and IV vancomycin. Antibiotics-continue Merrem and linezolid for now; consider ID consultation on Monday. Follow cultures, and deescalate antibiotic when appropriate. Hematology/Oncology: Patient with chronic anemia; hemoglobin stable-9.4 this morning. Platelets-normal.  No active bleeding issues. Renal: S/p dialysis yesterday-3.5 L fluid removed. Monitor renal function. Calcium replaced. Nephrology-Dr. Jackeline Blanca. GI: PPI Prophylaxis. Chronic diarrhea-prn loperamide. Endocrine: Blood sugar stable; patient currently not needing sliding scale insulin. Neurology: Normal mentation. Nutrition: Oral diet. Prophylaxis: DVT Prophylaxis: Sc Heparin. GI Prophylaxis: Protonix. Lines/Tubes: PIV  Right chest wall dialysis catheter. Quality Care: PPI, DVT prophylaxis, HOB elevated, Infection control all reviewed and addressed. Care of plan d/w RN. D/w patient and updated management plans from ICU perspective (answered all questions to satisfaction). Patient likely can be transferred out of ICU later today. High complexity decision making was performed during the evaluation of this patient at high risk for decompensation with multiple organ involvement. Total critical care time spent rendering care exclusive of procedures/family discussion/coordination of care: 38 minutes. Subjective/History of Present Illness:     Patient is a 50 y.o. male with PMHx significant for end-stage renal disease on hemodialysis, hypertension, medical noncompliance. The patient had missed dialysis for few days, and presented to the dialysis center yesterday. He had significantly elevated blood pressure, and was sent to the ER.   In the ER, his blood pressure was 233/109 mmHg on arrival, and was in pulmonary edema. Patient needed antihypertensives, and subsequently placed on nicardipine drip, and transferred to the ICU. ID note reviewed from June 2022 admission. MSSA high grade and persistent bacteremia-- 6/20/2022 and 6/21/2022-- suspect TDC/AVG source. L arm AVG is opened up with drainage  --[facility] removed--6/22- cath tip MSSA   --AVG drain culture 6/22- S.aurues  Possible IE on MV- on TTE 6/22  COVID 19 Infection-- doubt viral PNA  --fully vaccinared and boosted 11/2021  - Patient was treated with cefazolin antibiotic      8/29/2022  Off icu now on medical floor  Now undergoing Hd   Tolerating well  Spoke to ID he will be observed off abx   I will sign off call me if new issues   Review of Systems: Unchanged  HEENT: No epistaxis, no nasal drainage, no difficulty in swallowing  Respiratory: No cough or shortness of breath or wheezing or hemoptysis  Cardiovascular: no chest pain, no palpitations, no chronic leg edema  Gastrointestinal: no abd pain, no vomiting, no bleeding symptoms  Genitourinary: No urinary symptoms or hematuria  Musculoskeletal: RT BKA  Neurological: No focal weakness, no seizures, no headaches  Behvioral/Psych: No anxiety, no depression  Constitutional: No fever, no chills      Allergies   Allergen Reactions    Penicillins Rash    Vancomycin Other (comments)     kayla syndrome      Past Medical History:   Diagnosis Date    Chronic kidney disease     Diabetes ([facility]     Heart failure ([facility]     Hemodialysis patient ([facility]     Hypertension     Sickle cell trait ([facility]       Past Surgical History:   Procedure Laterality Date    HX ORTHOPAEDIC      right BKA 2017    IR DECLOT AV GRAFT PERCUTANEOUS  6/3/2022    IR INSERT NON TUNL CVC OVER 5 YRS  6/1/2022      Social History     Tobacco Use    Smoking status: Former    Smokeless tobacco: Never   Substance Use Topics    Alcohol use: No      History reviewed. No pertinent family history.    Prior to Admission medications    Medication Sig Start Date End Date Taking? Authorizing Provider   calcitRIOL (ROCALTROL) 0.25 mcg capsule Take 1 Capsule by mouth every Monday, Wednesday, Friday. 7/6/22   Amanda Arambula MD   epoetin delonte-epbx (RETACRIT) 20,000 unit/mL injection 1 mL by SubCUTAneous route every Monday, Wednesday, Friday. Indications: anemia due to kidney failure 7/6/22   Amanda Arambula MD   isosorbide mononitrate ER (IMDUR) 60 mg CR tablet Take 1 Tablet by mouth daily. 6/7/22   Inez Gotti MD   cyanocobalamin 1,000 mcg tablet Take 1 Tablet by mouth daily. 6/7/22   Inez Gotti MD   aspirin delayed-release 81 mg tablet Take 1 Tablet by mouth daily. 9/7/21   Chary Lira,    doxazosin (CARDURA) 4 mg tablet Take 2 mg by mouth daily. Provider, Historical   sodium hypochlorite 0.0125% (DAKINS SOLUTION) Apply 1 mL to affected area two (2) times a week. Provider, Historical   omeprazole (PRILOSEC) 40 mg capsule Take 40 mg by mouth daily. Provider, Historical   sevelamer carbonate (RENVELA) 800 mg tab tab Take 800 mg by mouth three (3) times daily (with meals). Provider, Historical   sodium bicarbonate 650 mg tablet Take 650 mg by mouth three (3) times daily.   Patient not taking: Reported on 8/26/2022    Provider, Historical     Current Facility-Administered Medications   Medication Dose Route Frequency    meropenem (MERREM) 500 mg in 0.9% sodium chloride (MBP/ADV) 50 mL MBP  0.5 g IntraVENous Q12H    linezolid in dextrose 5% (ZYVOX) IVPB premix in D5W 600 mg  600 mg IntraVENous Q12H    sevelamer carbonate (RENVELA) tab 1,600 mg  1,600 mg Oral TID WITH MEALS    doxercalciferoL (HECTOROL) 4 mcg/2 mL injection 4 mcg  4 mcg IntraVENous DIALYSIS MON, WED & FRI    aspirin delayed-release tablet 81 mg  81 mg Oral DAILY    isosorbide mononitrate ER (IMDUR) tablet 60 mg  60 mg Oral DAILY    sodium chloride (NS) flush 5-40 mL  5-40 mL IntraVENous Q8H    heparin (porcine) injection 5,000 Units  5,000 Units SubCUTAneous Q8H    amLODIPine (NORVASC) tablet 10 mg  10 mg Oral DAILY    losartan (COZAAR) tablet 100 mg  100 mg Oral DAILY    pantoprazole (PROTONIX) tablet 40 mg  40 mg Oral ACB    heparin (porcine) 1,000 unit/mL injection 3,200 Units  3,200 Units Hemodialysis DIALYSIS CONTINUOUS         Objective:   Vital Signs:    Visit Vitals  BP (!) 156/76   Pulse 85   Temp 99.3 °F (37.4 °C) (Oral)   Resp 17   Ht 5' 7\" (1.702 m)   Wt 77.6 kg (171 lb)   SpO2 100%   BMI 26.78 kg/m²       O2 Device: Nasal cannula   O2 Flow Rate (L/min): 2 l/min   Temp (24hrs), Av.7 °F (31.5 °C), Min:49.8 °F (9.9 °C), Max:99.3 °F (37.4 °C)       Intake/Output:   Last shift:       07 - 1900  In: 145 [P.O.:240;  I.V.:300]  Out: 3000     Last 3 shifts: 1901 -  07  In: 1200.8 [I.V.:1200.8]  Out: 100 [Urine:100]      Intake/Output Summary (Last 24 hours) at 2022 1420  Last data filed at 2022 1404  Gross per 24 hour   Intake 540 ml   Output 3000 ml   Net -2460 ml         Last 3 Recorded Weights in this Encounter    22 2210 22 0700 22 0751   Weight: 77.9 kg (171 lb 11.8 oz) 84.4 kg (186 lb 1.1 oz) 77.6 kg (171 lb)       Physical Exam:   Patient appears comfortable; currently on nasal cannula oxygen-2 L; acyanotic  HEENT: pupils not dilated, reactive, no scleral jaundice  Neck: No adenopathy or thyroid swelling  CVS: Normal heart sounds; S1 and S2 with no murmurs; telemetry-sinus rhythm; JVD not elevated   RS: Good air entry bilaterally; no wheezing or crackles; normal respirations  Abd: Soft, no tenderness, no distention, no guarding or rigidity, bowel sounds present, no hepatosplenomegaly   Neuro: non focal, awake, alert  Extrm: no leg edema or swelling or clubbing  Skin: no rash  Lymphatic: no cervical or supraclavicular adenopathy  Psych: Cooperative  Vasc: Right chest wall dialysis catheter  MS: Right below-knee amputation      Data:       Recent Results (from the past 24 hour(s))   GLUCOSE, POC    Collection Time: 08/28/22  3:33 PM   Result Value Ref Range    Glucose (POC) 76 70 - 110 mg/dL   GLUCOSE, POC    Collection Time: 08/29/22 12:07 AM   Result Value Ref Range    Glucose (POC) 104 70 - 110 mg/dL   MAGNESIUM    Collection Time: 08/29/22  1:11 AM   Result Value Ref Range    Magnesium 2.6 1.6 - 2.6 mg/dL   CBC WITH AUTOMATED DIFF    Collection Time: 08/29/22  1:11 AM   Result Value Ref Range    WBC 6.1 4.6 - 13.2 K/uL    RBC 3.81 (L) 4.35 - 5.65 M/uL    HGB 10.2 (L) 13.0 - 16.0 g/dL    HCT 31.5 (L) 36.0 - 48.0 %    MCV 82.7 78.0 - 100.0 FL    MCH 26.8 24.0 - 34.0 PG    MCHC 32.4 31.0 - 37.0 g/dL    RDW 20.3 (H) 11.6 - 14.5 %    PLATELET 411 294 - 240 K/uL    MPV 10.2 9.2 - 11.8 FL    NRBC 0.0 0  WBC    ABSOLUTE NRBC 0.00 0.00 - 0.01 K/uL    NEUTROPHILS 68 40 - 73 %    LYMPHOCYTES 14 (L) 21 - 52 %    MONOCYTES 9 3 - 10 %    EOSINOPHILS 8 (H) 0 - 5 %    BASOPHILS 1 0 - 2 %    IMMATURE GRANULOCYTES 1 (H) 0.0 - 0.5 %    ABS. NEUTROPHILS 4.2 1.8 - 8.0 K/UL    ABS. LYMPHOCYTES 0.8 (L) 0.9 - 3.6 K/UL    ABS. MONOCYTES 0.6 0.05 - 1.2 K/UL    ABS. EOSINOPHILS 0.5 (H) 0.0 - 0.4 K/UL    ABS. BASOPHILS 0.0 0.0 - 0.1 K/UL    ABS. IMM.  GRANS. 0.0 0.00 - 0.04 K/UL    DF AUTOMATED     METABOLIC PANEL, BASIC    Collection Time: 08/29/22  1:11 AM   Result Value Ref Range    Sodium 138 136 - 145 mmol/L    Potassium 4.1 3.5 - 5.5 mmol/L    Chloride 107 100 - 111 mmol/L    CO2 23 21 - 32 mmol/L    Anion gap 8 3.0 - 18 mmol/L    Glucose 100 (H) 74 - 99 mg/dL    BUN 54 (H) 7.0 - 18 MG/DL    Creatinine 9.00 (H) 0.6 - 1.3 MG/DL    BUN/Creatinine ratio 6 (L) 12 - 20      GFR est AA 8 (L) >60 ml/min/1.73m2    GFR est non-AA 6 (L) >60 ml/min/1.73m2    Calcium 8.7 8.5 - 10.1 MG/DL   GLUCOSE, POC    Collection Time: 08/29/22  6:55 AM   Result Value Ref Range    Glucose (POC) 84 70 - 110 mg/dL   GLUCOSE, POC    Collection Time: 08/29/22 11:44 AM   Result Value Ref Range    Glucose (POC) 98 70 - 110 mg/dL         Chemistry Recent Labs     08/29/22  0111 08/28/22  0428 08/27/22  0417   * 83 78    139 140   K 4.1 4.3 3.4*    107 107   CO2 23 23 26   BUN 54* 49* 36*   CREA 9.00* 7.89* 6.17*   CA 8.7 8.2* 8.6   MG 2.6 2.5 2.7*   AGAP 8 9 7   BUCR 6* 6* 6*          Lactic Acid Lactic acid   Date Value Ref Range Status   06/29/2022 1.9 0.4 - 2.0 MMOL/L Final     No results for input(s): LAC in the last 72 hours. Liver Enzymes Protein, total   Date Value Ref Range Status   08/26/2022 8.1 6.4 - 8.2 g/dL Final     Albumin   Date Value Ref Range Status   08/26/2022 2.9 (L) 3.4 - 5.0 g/dL Final     Globulin   Date Value Ref Range Status   08/26/2022 5.2 (H) 2.0 - 4.0 g/dL Final     A-G Ratio   Date Value Ref Range Status   08/26/2022 0.6 (L) 0.8 - 1.7   Final     Alk. phosphatase   Date Value Ref Range Status   08/26/2022 102 45 - 117 U/L Final     No results for input(s): TP, ALB, GLOB, AGRAT, AP, TBIL in the last 72 hours. No lab exists for component: SGOT, GPT, DBIL       CBC w/Diff Recent Labs     08/29/22  0111 08/28/22 0428 08/27/22 0417   WBC 6.1 5.5 4.4*   RBC 3.81* 3.57* 3.59*   HGB 10.2* 9.4* 9.6*   HCT 31.5* 29.1* 29.1*    206 153   GRANS 68 68 72   LYMPH 14* 15* 11*   EOS 8* 6* 7*          Cardiac Enzymes No results found for: CPK, CK, CKMMB, CKMB, RCK3, CKMBT, CKNDX, CKND1, WILLIE, TROPT, TROIQ, CHANDA, TROPT, TNIPOC, BNP, BNPP     BNP No results found for: BNP, BNPP, XBNPT     Coagulation No results for input(s): PTP, INR, APTT, INREXT, INREXT in the last 72 hours.         Thyroid  Lab Results   Component Value Date/Time    TSH 2.38 08/29/2021 01:20 AM       No results found for: T4     Urinalysis Lab Results   Component Value Date/Time    Color YELLOW 03/02/2018 05:41 AM    Appearance CLEAR 03/02/2018 05:41 AM    Specific gravity 1.010 03/02/2018 05:41 AM    pH (UA) 5.0 03/02/2018 05:41 AM    Protein 300 (A) 03/02/2018 05:41 AM    Glucose NEGATIVE  03/02/2018 05:41 AM    Ketone NEGATIVE  03/02/2018 05:41 AM    Bilirubin NEGATIVE  03/02/2018 05:41 AM    Urobilinogen 0.2 03/02/2018 05:41 AM    Nitrites NEGATIVE  03/02/2018 05:41 AM    Leukocyte Esterase NEGATIVE  03/02/2018 05:41 AM    Epithelial cells FEW 03/02/2018 05:41 AM    Bacteria FEW (A) 03/02/2018 05:41 AM    WBC 0 to 2 03/02/2018 05:41 AM    RBC NEGATIVE  03/02/2018 05:41 AM        Micro  Recent Labs     08/27/22 1036 08/27/22 1035   CULT NO GROWTH 2 DAYS NO GROWTH 2 DAYS       Recent Labs     08/27/22  1036 08/27/22 1035   CULT NO GROWTH 2 DAYS NO GROWTH 2 DAYS            Culture data during this hospitalization. All Micro Results       Procedure Component Value Units Date/Time    CULTURE, BLOOD [[id number]] Collected: 08/27/22 1035    Order Status: Completed Specimen: Blood Updated: 08/29/22 0720     Special Requests: NO SPECIAL REQUESTS        Culture result: NO GROWTH 2 DAYS       CULTURE, BLOOD [[id number]] Collected: 08/27/22 1036    Order Status: Completed Specimen: Blood Updated: 08/29/22 0720     Special Requests: NO SPECIAL REQUESTS        Culture result: NO GROWTH 2 DAYS       COVID-19 RAPID TEST [[id number]] Collected: 08/27/22 1550    Order Status: Completed Specimen: Nasopharyngeal Updated: 08/27/22 1615     Specimen source Nasopharyngeal        COVID-19 rapid test Not detected        Comment: Rapid Abbott ID Now       Rapid NAAT:  The specimen is NEGATIVE for SARS-CoV-2, the novel coronavirus associated with COVID-19. Negative results should be treated as presumptive and, if inconsistent with clinical signs and symptoms or necessary for patient management, should be tested with an alternative molecular assay. Negative results do not preclude SARS-CoV-2 infection and should not be used as the sole basis for patient management decisions. This test has been authorized by the FDA under an Emergency Use Authorization (EUA) for use by authorized laboratories.    Fact sheet for Healthcare Providers: ConventionUpdate.co.nz  Fact sheet for Patients: ConventionUpdate.co.nz       Methodology: Isothermal Nucleic Acid Amplification                  ECHO 6/22/2022 Interpretation Summary  Result status: Final result     Left Ventricle: Normal left ventricular systolic function with a visually estimated EF of 55 - 60%. Left ventricle size is normal. Moderately increased wall thickness. Normal wall motion. Abnormal diastolic function. Aortic Valve: Tricuspid valve. Mildly calcified cusp. Left Atrium: Left atrium is mildly dilated. Pericardium: Small (<1 cm) pericardial effusion present. No indication of cardiac tamponade. Unable to assess RVSP due to inadequate or insignificant tricuspid regurgitation. Mitral annular calcification and calcification of the posterior mitral leaflet. There is a mobile probably calcified nodule on the posterior leaflet of the mitral valve. Vegetation cannot be ruled out on this transthoracic study. Consider BRENDEN for further evaluation. Images report reviewed by me:  CT 6/1/2022 (Most Recent)  Results from Hospital Encounter encounter on 06/01/22    CT HEAD WO CONT    Narrative  EXAM: CT head    INDICATION: Confusion. COMPARISON: 8/29/2021    TECHNIQUE: Axial CT imaging of the head was performed without intravenous  contrast. Standard multiplanar coronal and sagittal reformatted images were  obtained and are included in interpretation. One or more dose reduction techniques were used on this CT: automated exposure  control, adjustment of the mAs and/or kVp according to patient size, and  iterative reconstruction techniques. The specific techniques used on this CT  exam have been documented in the patient's electronic medical record.   Digital  Imaging and Communications in Medicine (DICOM) format image data are available  to nonaffiliated external healthcare facilities or entities on a secure, media  free, reciprocally searchable basis with patient authorization for at least a  12-month period after this study. _______________    FINDINGS:    BRAIN AND POSTERIOR FOSSA: Right central arden hypodensity, new from prior study. No evidence of acute large vessel transcortical infarct or acute parenchymal  hemorrhage. No midline shift or hydrocephalus. EXTRA-AXIAL SPACES AND MENINGES: There are no abnormal extra-axial fluid  collections. CALVARIUM: Intact. SINUSES: Clear. OTHER: None.    _______________    Impression  Right central arden hypodensity, new from prior study, age indeterminate infarct. Correlate clinically. CXR reviewed by me:  XR 8/26 (Most Recent). Results from Hospital Encounter encounter on 08/26/22    XR CHEST PORT    Narrative  EXAM: CHEST RADIOGRAPH    CLINICAL INDICATION/HISTORY: CHF vs pneumonia  > Additional: None    COMPARISON: 8/26/2022. TECHNIQUE: Portable frontal view of the chest    _______________    FINDINGS:    SUPPORT DEVICES: Right central venous catheter is stable. HEART AND MEDIASTINUM: Cardiac size is enlarged. Remaining mediastinal contours  are stable. LUNGS AND PLEURAL SPACES: Interstitial markings are slightly prominent but  improved. No consolidation, mass or effusion. BONY THORAX AND SOFT TISSUES: Unremarkable.    _______________    Impression  1. Cardiomegaly with slight improvement of pulmonary edema. Please note: Voice-recognition software may have been used to generate this report, which may have resulted in some phonetic-based errors in grammar and contents. Even though attempts were made to correct all the mistakes, some may have been missed, and remained in the body of the document.       Avani Arraiga MD  8/29/2022

## 2022-08-29 NOTE — PROGRESS NOTES
Problem: Chronic Renal Failure  Goal: *Fluid and electrolytes stabilized  Outcome: Progressing Towards Goal     Problem: Patient Education: Go to Patient Education Activity  Goal: Patient/Family Education  Outcome: Progressing Towards Goal     Problem: Pressure Injury - Risk of  Goal: *Prevention of pressure injury  Description: Document Tucker Scale and appropriate interventions in the flowsheet. Outcome: Progressing Towards Goal  Note: Pressure Injury Interventions:  Sensory Interventions: Assess changes in LOC, Assess need for specialty bed, Avoid rigorous massage over bony prominences, Chair cushion, Check visual cues for pain, Discuss PT/OT consult with provider, Float heels, Keep linens dry and wrinkle-free, Maintain/enhance activity level, Minimize linen layers, Monitor skin under medical devices, Pad between skin to skin, Pressure redistribution bed/mattress (bed type), Turn and reposition approx. every two hours (pillows and wedges if needed)    Moisture Interventions: Absorbent underpads    Activity Interventions: Assess need for specialty bed, Chair cushion, Increase time out of bed, Pressure redistribution bed/mattress(bed type), PT/OT evaluation    Mobility Interventions: Assess need for specialty bed, Chair cushion, Float heels, Pressure redistribution bed/mattress (bed type), PT/OT evaluation, Turn and reposition approx.  every two hours(pillow and wedges)    Nutrition Interventions: Document food/fluid/supplement intake, Discuss nutritional consult with provider, Offer support with meals,snacks and hydration    Friction and Shear Interventions: Feet elevated on foot rest, Foam dressings/transparent film/skin sealants, Lift sheet, Lift team/patient mobility team, Minimize layers, Transferring/repositioning devices                Problem: Patient Education: Go to Patient Education Activity  Goal: Patient/Family Education  Outcome: Progressing Towards Goal     Problem: Falls - Risk of  Goal: *Absence of Falls  Description: Document Gilma Vidhya Fall Risk and appropriate interventions in the flowsheet.   Outcome: Progressing Towards Goal  Note: Fall Risk Interventions:  Mobility Interventions: Bed/chair exit alarm, Communicate number of staff needed for ambulation/transfer, OT consult for ADLs, PT Consult for mobility concerns, Strengthening exercises (ROM-active/passive), Patient to call before getting OOB, PT Consult for assist device competence, Utilize walker, cane, or other assistive device    Mentation Interventions: Adequate sleep, hydration, pain control, Bed/chair exit alarm, Door open when patient unattended, Evaluate medications/consider consulting pharmacy, Eyeglasses and hearing aids, Familiar objects from home, Update white board, Toileting rounds, More frequent rounding, Increase mobility    Medication Interventions: Bed/chair exit alarm, Patient to call before getting OOB, Evaluate medications/consider consulting pharmacy, Teach patient to arise slowly    Elimination Interventions: Bed/chair exit alarm, Call light in reach, Elevated toilet seat, Patient to call for help with toileting needs, Stay With Me (per policy), Toilet paper/wipes in reach, Toileting schedule/hourly rounds, Urinal in reach (dialysis)    History of Falls Interventions: Bed/chair exit alarm, Consult care management for discharge planning, Door open when patient unattended, Evaluate medications/consider consulting pharmacy, Investigate reason for fall, Utilize gait belt for transfer/ambulation, Vital signs minimum Q4HRs X 24 hrs (comment for end date), Assess for delayed presentation/identification of injury for 48 hrs (comment for end date)         Problem: Patient Education: Go to Patient Education Activity  Goal: Patient/Family Education  Outcome: Progressing Towards Goal     Problem: Risk for Spread of Infection  Goal: Prevent transmission of infectious organism to others  Description: Prevent the transmission of infectious organisms to other patients, staff members, and visitors.   Outcome: Progressing Towards Goal     Problem: Patient Education:  Go to Education Activity  Goal: Patient/Family Education  Outcome: Progressing Towards Goal     Problem: Pain  Goal: *Control of Pain  8/29/2022 1002 by Elyssa Simpson RN  Outcome: Progressing Towards Goal  8/29/2022 1002 by Elyssa Simpson RN  Outcome: Progressing Towards Goal  Goal: *PALLIATIVE CARE:  Alleviation of Pain  8/29/2022 1002 by Elyssa Simpson RN  Outcome: Progressing Towards Goal  8/29/2022 1002 by Elyssa Simpson RN  Outcome: Progressing Towards Goal     Problem: Patient Education: Go to Patient Education Activity  Goal: Patient/Family Education  Outcome: Progressing Towards Goal

## 2022-08-29 NOTE — PROGRESS NOTES
[facility] called and asked CM for a new UAI for the patient so they could get him waiver services.

## 2022-08-29 NOTE — PROGRESS NOTES
conducted an initial consultation and Spiritual Assessment for Yany Piper, who is a 50 y.o.,male. Patients Primary Language is: [place]. According to the patients EMR Muslim Affiliation is: [facility].     The reason the Patient came to the hospital is:   Patient Active Problem List    Diagnosis Date Noted    Chronic diarrhea 08/28/2022    Cardiomegaly 08/27/2022    Pulmonary infiltrates 08/27/2022    History of COVID-19 08/27/2022    Chronic anemia 08/27/2022    Hypoxia 08/26/2022    Hypertensive emergency 08/26/2022    Arteriovenous graft infection ([facility] 06/22/2022    Acute pulmonary edema ([facility] 06/20/2022    Hypertension 06/01/2022    Non-compliance with renal dialysis ([facility] 08/27/2021    ESRD (end stage renal disease) on dialysis ([facility] 01/29/2020    HTN (hypertension) 01/29/2020    Hx of BKA, right ([facility] 01/29/2020    Iron deficiency anemia 03/04/2018        The  provided the following Interventions:  Initiated a relationship of care and support. Listened empathically. Provided information about Spiritual Care Services. Offered assurance of prayer on patient's behalf. Chart reviewed. The following outcomes where achieved:  Provided Devotional material for patient. Patient expressed gratitude for 's visit. Assessment:  Patient does not have any Restoration/cultural needs that will affect patients preferences in health care. Plan:  Chaplains will continue to follow and will provide pastoral care on an as needed/requested basis.  recommends bedside caregivers page  on duty if patient shows signs of acute spiritual or emotional distress. Rev.  Zachary Lester, Certified Respecting [address] [place] [address] Consultant  [facility]  [phone]

## 2022-08-30 VITALS
RESPIRATION RATE: 18 BRPM | TEMPERATURE: 98.5 F | HEIGHT: 67 IN | WEIGHT: 171 LBS | HEART RATE: 93 BPM | BODY MASS INDEX: 26.84 KG/M2 | OXYGEN SATURATION: 97 % | SYSTOLIC BLOOD PRESSURE: 159 MMHG | DIASTOLIC BLOOD PRESSURE: 73 MMHG

## 2022-08-30 LAB
ANION GAP SERPL CALC-SCNC: 7 MMOL/L (ref 3–18)
BASOPHILS # BLD: 0 K/UL (ref 0–0.1)
BASOPHILS NFR BLD: 1 % (ref 0–2)
BUN SERPL-MCNC: 33 MG/DL (ref 7–18)
BUN/CREAT SERPL: 5 (ref 12–20)
CALCIUM SERPL-MCNC: 8.8 MG/DL (ref 8.5–10.1)
CHLORIDE SERPL-SCNC: 108 MMOL/L (ref 100–111)
CO2 SERPL-SCNC: 24 MMOL/L (ref 21–32)
CREAT SERPL-MCNC: 6.25 MG/DL (ref 0.6–1.3)
DIFFERENTIAL METHOD BLD: ABNORMAL
EOSINOPHIL # BLD: 0.4 K/UL (ref 0–0.4)
EOSINOPHIL NFR BLD: 7 % (ref 0–5)
ERYTHROCYTE [DISTWIDTH] IN BLOOD BY AUTOMATED COUNT: 21 % (ref 11.6–14.5)
GLUCOSE BLD STRIP.AUTO-MCNC: 139 MG/DL (ref 70–110)
GLUCOSE BLD STRIP.AUTO-MCNC: 74 MG/DL (ref 70–110)
GLUCOSE BLD STRIP.AUTO-MCNC: 87 MG/DL (ref 70–110)
GLUCOSE SERPL-MCNC: 96 MG/DL (ref 74–99)
HCT VFR BLD AUTO: 31.9 % (ref 36–48)
HGB BLD-MCNC: 10.2 G/DL (ref 13–16)
IMM GRANULOCYTES # BLD AUTO: 0 K/UL (ref 0–0.04)
IMM GRANULOCYTES NFR BLD AUTO: 0 % (ref 0–0.5)
LYMPHOCYTES # BLD: 0.8 K/UL (ref 0.9–3.6)
LYMPHOCYTES NFR BLD: 14 % (ref 21–52)
MCH RBC QN AUTO: 26.4 PG (ref 24–34)
MCHC RBC AUTO-ENTMCNC: 32 G/DL (ref 31–37)
MCV RBC AUTO: 82.6 FL (ref 78–100)
MONOCYTES # BLD: 0.5 K/UL (ref 0.05–1.2)
MONOCYTES NFR BLD: 10 % (ref 3–10)
NEUTS SEG # BLD: 3.6 K/UL (ref 1.8–8)
NEUTS SEG NFR BLD: 67 % (ref 40–73)
NRBC # BLD: 0 K/UL (ref 0–0.01)
NRBC BLD-RTO: 0 PER 100 WBC
PLATELET # BLD AUTO: 242 K/UL (ref 135–420)
PMV BLD AUTO: 10.6 FL (ref 9.2–11.8)
POTASSIUM SERPL-SCNC: 4.2 MMOL/L (ref 3.5–5.5)
RBC # BLD AUTO: 3.86 M/UL (ref 4.35–5.65)
SODIUM SERPL-SCNC: 139 MMOL/L (ref 136–145)
WBC # BLD AUTO: 5.4 K/UL (ref 4.6–13.2)

## 2022-08-30 PROCEDURE — 74011000250 HC RX REV CODE- 250: Performed by: HOSPITALIST

## 2022-08-30 PROCEDURE — 80048 BASIC METABOLIC PNL TOTAL CA: CPT

## 2022-08-30 PROCEDURE — 74011250637 HC RX REV CODE- 250/637: Performed by: HOSPITALIST

## 2022-08-30 PROCEDURE — 85025 COMPLETE CBC W/AUTO DIFF WBC: CPT

## 2022-08-30 PROCEDURE — 82962 GLUCOSE BLOOD TEST: CPT

## 2022-08-30 PROCEDURE — 36415 COLL VENOUS BLD VENIPUNCTURE: CPT

## 2022-08-30 PROCEDURE — 74011250637 HC RX REV CODE- 250/637: Performed by: STUDENT IN AN ORGANIZED HEALTH CARE EDUCATION/TRAINING PROGRAM

## 2022-08-30 PROCEDURE — 74011250636 HC RX REV CODE- 250/636: Performed by: HOSPITALIST

## 2022-08-30 RX ORDER — SPIRONOLACTONE 100 MG/1
100 TABLET, FILM COATED ORAL DAILY
Status: DISCONTINUED | OUTPATIENT
Start: 2022-08-30 | End: 2022-08-31 | Stop reason: HOSPADM

## 2022-08-30 RX ORDER — SPIRONOLACTONE 100 MG/1
100 TABLET, FILM COATED ORAL DAILY
Qty: 30 TABLET | Refills: 0 | Status: SHIPPED | OUTPATIENT
Start: 2022-08-31

## 2022-08-30 RX ORDER — LOSARTAN POTASSIUM 100 MG/1
100 TABLET ORAL DAILY
Qty: 30 TABLET | Refills: 0 | Status: SHIPPED | OUTPATIENT
Start: 2022-08-31

## 2022-08-30 RX ORDER — PANTOPRAZOLE SODIUM 40 MG/1
40 TABLET, DELAYED RELEASE ORAL
Qty: 30 TABLET | Refills: 0 | Status: SHIPPED | OUTPATIENT
Start: 2022-08-31

## 2022-08-30 RX ORDER — AMLODIPINE BESYLATE 10 MG/1
10 TABLET ORAL DAILY
Qty: 30 TABLET | Refills: 0 | Status: SHIPPED | OUTPATIENT
Start: 2022-08-31

## 2022-08-30 RX ADMIN — ASPIRIN 81 MG: 81 TABLET, COATED ORAL at 08:46

## 2022-08-30 RX ADMIN — ISOSORBIDE MONONITRATE 60 MG: 60 TABLET, EXTENDED RELEASE ORAL at 08:46

## 2022-08-30 RX ADMIN — HEPARIN SODIUM 5000 UNITS: 5000 INJECTION INTRAVENOUS; SUBCUTANEOUS at 01:41

## 2022-08-30 RX ADMIN — LOSARTAN POTASSIUM 100 MG: 50 TABLET, FILM COATED ORAL at 08:46

## 2022-08-30 RX ADMIN — SEVELAMER CARBONATE 1600 MG: 800 TABLET, FILM COATED ORAL at 12:27

## 2022-08-30 RX ADMIN — HYDRALAZINE HYDROCHLORIDE 10 MG: 20 INJECTION INTRAMUSCULAR; INTRAVENOUS at 00:01

## 2022-08-30 RX ADMIN — AMLODIPINE BESYLATE 10 MG: 5 TABLET ORAL at 08:46

## 2022-08-30 RX ADMIN — HEPARIN SODIUM 5000 UNITS: 5000 INJECTION INTRAVENOUS; SUBCUTANEOUS at 17:28

## 2022-08-30 RX ADMIN — HEPARIN SODIUM 5000 UNITS: 5000 INJECTION INTRAVENOUS; SUBCUTANEOUS at 08:53

## 2022-08-30 RX ADMIN — SPIRONOLACTONE 100 MG: 100 TABLET ORAL at 12:27

## 2022-08-30 RX ADMIN — SEVELAMER CARBONATE 1600 MG: 800 TABLET, FILM COATED ORAL at 08:46

## 2022-08-30 RX ADMIN — SODIUM CHLORIDE, PRESERVATIVE FREE 10 ML: 5 INJECTION INTRAVENOUS at 14:00

## 2022-08-30 RX ADMIN — PANTOPRAZOLE SODIUM 40 MG: 40 TABLET, DELAYED RELEASE ORAL at 08:46

## 2022-08-30 NOTE — PROGRESS NOTES
Alert and oriented x4, assessments and VS completed, pt has no reports of pain, medications provided, intake and output monitored. [address] trans for  time, transport stated that patient would be picked up within the 1800 hour, discharge order notified, tele monitor removed and IV removed, patient tolerated well awaiting transport at this time, discharge instructions provided to patient, pt verbalized understanding.

## 2022-08-30 NOTE — PROGRESS NOTES
[facility]  (A Division of [address] [place] [address])                                                                                                                      Lisa Mcdonald MD  Office #: - Option # [phone]  Fax #: [phone]     Date of Admission: 8/26/2022Date of Note: 8/30/2022  Reason for Referral: Evaluation and antibiotic management of possible endocarditis. 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 TTE 8/28 with MV calcification on posterior side note-- which was site of veg on prior TTE 6/2022. EF is preserved  Hospital admission [age] yo 8/8/22-> [place]  Briefly intubated  E.cloacae in resp culture treated  Had hematemsis-post EGD  Hypertensive urgency-- reason for admission  ESRD on HD  R BKA  DM  History of C.diff  COVID 19 infection- tested + 06/2022   Recommendation for ID issues I am following:  ------------------------------------------------------------------------------  DW with Dr Erika Leaf      --best to monitor patient off abx at this time, and BP control  --will follow closely off abx  --he is high risk for C.diff-- monitor bowel changes    DC plan per primary- no long term abx needed               Subjective:  He feels better. Off oxygen supplement. Afebrile. No chest complaints such as cough/sob/chest pain  BP profile seems better controlled  BM X2 over 24 hours- loose but not watery.  He has no abd pain/N/V          HPI:  Sheryle Lacks is a 50 y.o. 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 AVG is excised completely at that time and he was treated with IV cefazolin with Renal team at HD until 8/4/22. He was admitted from 8/3 to 8/8/22 at [facility], due to fall/ syncope and hematemsis. Briefly intubated. He had E.cloace growth from resp cutlure 8/3-- that was treated with abx, and with Bactrim, states he was not on oral ABX on discharge. He was doing his routine HD when his renal MD decided to send him to ED due to hypertensive urgency/ pul edema on 8/27/22. He denies F/C/R/V/V or chest pain. Was with high BP in ED and saturating 87%. He was placed on meropenem and linezolid after evaluation. Active Hospital Problems    Diagnosis Date Noted    Chronic diarrhea 08/28/2022    Cardiomegaly 08/27/2022    Pulmonary infiltrates 08/27/2022    History of COVID-19 08/27/2022    Chronic anemia 08/27/2022    Hypoxia 08/26/2022    Hypertensive emergency 08/26/2022    Acute pulmonary edema ([address] 06/20/2022    Non-compliance with renal dialysis ([address] 08/27/2021    ESRD (end stage renal disease) on dialysis ([address] 01/29/2020    Hx of BKA, right ([address] 01/29/2020    HTN (hypertension) 01/29/2020     Past Medical History:   Diagnosis Date    Chronic kidney disease     Diabetes ([address]     Heart failure ([address]     Hemodialysis patient ([address]     Hypertension     Sickle cell trait ([address]      Past Surgical History:   Procedure Laterality Date    HX ORTHOPAEDIC      right BKA 2017    IR DECLOT AV GRAFT PERCUTANEOUS  6/3/2022    IR INSERT NON TUNL CVC OVER 5 YRS  6/1/2022     History reviewed. No pertinent family history.   Social History     Socioeconomic History    Marital status: LEGALLY      Spouse name: Not on file    Number of children: Not on file    Years of education: Not on file    Highest education level: Not on file   Occupational History Not on file   Tobacco Use    Smoking status: Former    Smokeless tobacco: Never   Substance and Sexual Activity    Alcohol use: No    Drug use: No    Sexual activity: Not on file   Other Topics Concern    Not on file   Social History Narrative    Not on file     Social Determinants of Health     Financial Resource Strain: Not on file   Food Insecurity: Not on file   Transportation Needs: Not on file   Physical Activity: Not on file   Stress: Not on file   Social Connections: Not on file   Intimate Partner Violence: Not on file   Housing Stability: Not on file       Allergies:  Penicillins and Vancomycin     Medications:  Current Facility-Administered Medications   Medication Dose Route Frequency    loperamide (IMODIUM) capsule 2 mg  2 mg Oral Q12H PRN    diphenhydrAMINE (BENADRYL) injection 12.5 mg  12.5 mg IntraVENous Q6H PRN    sevelamer carbonate (RENVELA) tab 1,600 mg  1,600 mg Oral TID WITH MEALS    doxercalciferoL (HECTOROL) 4 mcg/2 mL injection 4 mcg  4 mcg IntraVENous DIALYSIS MON, WED & FRI    aspirin delayed-release tablet 81 mg  81 mg Oral DAILY    isosorbide mononitrate ER (IMDUR) tablet 60 mg  60 mg Oral DAILY    hydrALAZINE (APRESOLINE) 20 mg/mL injection 10 mg  10 mg IntraVENous Q6H PRN    sodium chloride (NS) flush 5-40 mL  5-40 mL IntraVENous Q8H    sodium chloride (NS) flush 5-40 mL  5-40 mL IntraVENous PRN    acetaminophen (TYLENOL) tablet 650 mg  650 mg Oral Q6H PRN    Or    acetaminophen (TYLENOL) suppository 650 mg  650 mg Rectal Q6H PRN    bisacodyL (DULCOLAX) suppository 10 mg  10 mg Rectal DAILY PRN    promethazine (PHENERGAN) tablet 12.5 mg  12.5 mg Oral Q6H PRN    Or    ondansetron (ZOFRAN) injection 4 mg  4 mg IntraVENous Q6H PRN    heparin (porcine) injection 5,000 Units  5,000 Units SubCUTAneous Q8H    amLODIPine (NORVASC) tablet 10 mg  10 mg Oral DAILY    losartan (COZAAR) tablet 100 mg  100 mg Oral DAILY    pantoprazole (PROTONIX) tablet 40 mg  40 mg Oral ACB    heparin (porcine) 1,000 unit/mL injection 3,200 Units  3,200 Units Hemodialysis DIALYSIS CONTINUOUS        ROS:  Pertinent items are noted in the History of Present Illness. Physical Exam:    Temp (24hrs), Av.4 °F (36.9 °C), Min:98.1 °F (36.7 °C), Max:99 °F (37.2 °C)    Visit Vitals  BP (!) 142/74   Pulse 97   Temp 98.7 °F (37.1 °C)   Resp 18   Ht 5' 7\" (1.702 m)   Wt 77.6 kg (171 lb)   SpO2 97%   BMI 26.78 kg/m²          GEN: WD chronically sick looking, on RA-not in resp distress. HEENT: Unicteric. EOMI intac  --no neck swelling  --R chest TDC-- with ongoing HD  CHEST: Non laboured breathing. CTA anteriorly  CVS:RRR, no mur/gallop  ABD: Obese/soft. Non tender. Non distended. ABS  YOVANY: Deferred  EXT: No apparent swelling or redness on UE/LE joints. --R BKA  Skin: Dry and intact. No rash, no redness. no peripheral sign of IE  CNS: A, OX3. Moves all extremity. CN grossly ok. Microbiology  All Micro Results       Procedure Component Value Units Date/Time    CULTURE, BLOOD [[id number]] Collected: 22    Order Status: Completed Specimen: Blood Updated: 22     Special Requests: NO SPECIAL REQUESTS        Culture result: NO GROWTH 3 DAYS       CULTURE, BLOOD [[id number]] Collected: 22 1036    Order Status: Completed Specimen: Blood Updated: 22     Special Requests: NO SPECIAL REQUESTS        Culture result: NO GROWTH 3 DAYS       COVID-19 RAPID TEST [[id number]] Collected: 22 1550    Order Status: Completed Specimen: Nasopharyngeal Updated: 22 1615     Specimen source Nasopharyngeal        COVID-19 rapid test Not detected        Comment: Rapid Abbott ID Now       Rapid NAAT:  The specimen is NEGATIVE for SARS-CoV-2, the novel coronavirus associated with COVID-19.        Negative results should be treated as presumptive and, if inconsistent with clinical signs and symptoms or necessary for patient management, should be tested with an alternative molecular assay.  Negative results do not preclude SARS-CoV-2 infection and should not be used as the sole basis for patient management decisions. This test has been authorized by the FDA under an Emergency Use Authorization (EUA) for use by authorized laboratories. Fact sheet for Healthcare Providers: ConventionUpdate.co.nz  Fact sheet for Patients: ConventionUpdate.co.nz       Methodology: Isothermal Nucleic Acid Amplification                   Lab results:    Chemistry  Recent Labs     08/30/22 0113 08/29/22 0111 08/28/22  0428   GLU 96 100* 83    138 139   K 4.2 4.1 4.3    107 107   CO2 24 23 23   BUN 33* 54* 49*   CREA 6.25* 9.00* 7.89*   CA 8.8 8.7 8.2*   AGAP 7 8 9   BUCR 5* 6* 6*       CBC w/ Diff  Recent Labs     08/30/22 0113 08/29/22 0111 08/28/22 0428   WBC 5.4 6.1 5.5   RBC 3.86* 3.81* 3.57*   HGB 10.2* 10.2* 9.4*   HCT 31.9* 31.5* 29.1*    237 206   GRANS 67 68 68   LYMPH 14* 14* 15*   EOS 7* 8* 6*       XR CHEST PORT    Result Date: 8/28/2022  EXAM: CHEST RADIOGRAPH CLINICAL INDICATION/HISTORY: CHF vs pneumonia   > Additional: None COMPARISON: 8/26/2022. TECHNIQUE: Portable frontal view of the chest _______________ FINDINGS: SUPPORT DEVICES: Right central venous catheter is stable. HEART AND MEDIASTINUM: Cardiac size is enlarged. Remaining mediastinal contours are stable. LUNGS AND PLEURAL SPACES: Interstitial markings are slightly prominent but improved. No consolidation, mass or effusion. BONY THORAX AND SOFT TISSUES: Unremarkable. _______________     1. Cardiomegaly with slight improvement of pulmonary edema. XR CHEST PORT    Result Date: 8/26/2022  EXAM: XR CHEST PORT CLINICAL INDICATION/HISTORY: cp -Additional: None COMPARISON: 6/20/2022 TECHNIQUE: Frontal view of the chest _______________ FINDINGS: HEART AND MEDIASTINUM: Right IJV dialysis catheter tip at the SVC. Cardiomegaly.  LUNGS AND PLEURAL SPACES: Bilateral parenchymal and interstitial opacities. No pneumothorax. BONY THORAX AND SOFT TISSUES: No acute osseous abnormality _______________     Cardiomegaly and pulmonary edema. ECHO ADULT COMPLETE    Result Date: 8/28/2022  Formatting of this result is different from the original.   Left Ventricle: Normal left ventricular systolic function with a visually estimated EF of 60 - 65%. Left ventricle size is normal. Mildly increased wall thickness. Findings consistent with mild concentric hypertrophy. Normal wall motion. Grade I diastolic dysfunction present with normal LV EF. Left Atrium: Left atrium is mildly dilated. Mitral Valve: Valve structure is normal. Moderate annular calcification at the posterior leaflet of the mitral valve. Mild regurgitation. Underlying vegetation cannot be ruled out   Unable to assess RVSP due to inadequate or insignificant tricuspid regurgitation. Consider Transesophageal echocardiogram if clinically indicated.      Procedures/imaging: see electronic medical records for all procedures/Xrays and details which were not copied into this note but were reviewed prior to creation of Plan

## 2022-08-30 NOTE — ROUTINE PROCESS
2100: Shift assessment completed per flow sheet. A/Ox4; lung sounds clear to diminished; lowered O2 to 1 liter for comfort; no pain or discomfort noted; Will continue to monitor. 0000:Reassessment completed per flow sheet without change. Pt is now on room air O2 sats at 100%. Will continue to monitor. 0400: Reassessment completed per flow sheet. No signs of pain or complaints of discomfort.

## 2022-08-30 NOTE — DISCHARGE SUMMARY
Discharge Summary    Patient: Dang Rueda MRN: [id number]  CSN: [id number]    YOB: 1970  Age: 50 y.o. Sex: male    DOA: 8/26/2022 LOS:  LOS: 4 days   Discharge Date:      Primary Care Provider:  Shai Roa MD    Admission Diagnoses: Hypertensive emergency [I16.1]  ESRD (end stage renal disease) ([facility] [N18.6]  Medically noncompliant [Z91.19]    Discharge Diagnoses:    Hospital Problems  Date Reviewed: 6/24/2022            Codes Class Noted POA    Chronic diarrhea ICD-10-CM: K52.9  ICD-9-CM: 787.91  8/28/2022 Unknown        Cardiomegaly ICD-10-CM: I51.7  ICD-9-CM: 429.3  8/27/2022 Unknown        Pulmonary infiltrates ICD-10-CM: R91.8  ICD-9-CM: 793.19  8/27/2022 Unknown        History of COVID-19 ICD-10-CM: Z86.16  ICD-9-CM: V12.09  8/27/2022 Unknown        Chronic anemia ICD-10-CM: D64.9  ICD-9-CM: 285.9  8/27/2022 Unknown        Hypoxia ICD-10-CM: R09.02  ICD-9-CM: 799.02  8/26/2022 Unknown        * (Principal) Hypertensive emergency ICD-10-CM: I16.1  ICD-9-CM: 401.9  8/26/2022 Unknown        Acute pulmonary edema ([facility] ICD-10-CM: J81.0  ICD-9-CM: 518.4  6/20/2022 Unknown        Non-compliance with renal dialysis ([facility] ICD-10-CM: Z91.15  ICD-9-CM: V45.12  8/27/2021 Yes        ESRD (end stage renal disease) on dialysis [facility]) ICD-10-CM: N18.6, Z99.2  ICD-9-CM: 585.6, V45.11  1/29/2020 Yes        HTN (hypertension) ICD-10-CM: I10  ICD-9-CM: 401.9  1/29/2020 Yes        Hx of BKA, right ([facility] ICD-10-CM: Z89.511  ICD-9-CM: V49.75  1/29/2020 Yes           Discharge Condition: stable     Discharge Medications:     Current Discharge Medication List        START taking these medications    Details   amLODIPine (NORVASC) 10 mg tablet Take 1 Tablet by mouth daily. Qty: 30 Tablet, Refills: 0  Start date: 8/31/2022      losartan (COZAAR) 100 mg tablet Take 1 Tablet by mouth daily.   Qty: 30 Tablet, Refills: 0  Start date: 8/31/2022      pantoprazole (PROTONIX) 40 mg tablet Take 1 Tablet by mouth Daily (before breakfast). Qty: 30 Tablet, Refills: 0  Start date: 8/31/2022      spironolactone (ALDACTONE) 100 mg tablet Take 1 Tablet by mouth daily. Qty: 30 Tablet, Refills: 0  Start date: 8/31/2022           CONTINUE these medications which have NOT CHANGED    Details   isosorbide mononitrate ER (IMDUR) 60 mg CR tablet Take 1 Tablet by mouth daily. Qty: 30 Tablet, Refills: 0      aspirin delayed-release 81 mg tablet Take 1 Tablet by mouth daily. Qty: 30 Tablet, Refills: 0      sevelamer carbonate (RENVELA) 800 mg tab tab Take 800 mg by mouth three (3) times daily (with meals). Procedures : none     Consults: Infectious Disease, Nephrology, and Pulmonary/Critical Care      PHYSICAL EXAM   Visit Vitals  BP (!) 156/71   Pulse 95   Temp 98.3 °F (36.8 °C)   Resp 18   Ht 5' 7\" (1.702 m)   Wt 77.6 kg (171 lb)   SpO2 95%   BMI 26.78 kg/m²     General: Awake, cooperative, no acute distress    HEENT: NC, Atraumatic. PERRLA, EOMI. Anicteric sclerae. Lungs:  CTA Bilaterally. No Wheezing/Rhonchi/Rales. Heart:  Regular  rhythm,  No murmur, No Rubs, No Gallops  Abdomen: Soft, Non distended, Non tender. +Bowel sounds,   Extremities: No c/c/e, rt bka   Psych:   Not anxious or agitated. Neurologic:  No acute neurological deficits. Admission HPI :   Romulo Amin is a 50 y.o. male with history of hypertension, end-stage renal disease on HD, hyperlipidemia, noncompliance with dialysis , cardiomegaly presented to ER due to elevated blood pressure. He missed hemodialysis for  he presented to HD center today for HD, he was sent to ER directly from Center because of elevated blood pressure unable to perform HD. On arrival, he presented hypoxia with 89% at room air, SBP over 233. Cardene drip also started in ER his blood pressure down to 179. Patient denies any chest pain, no headache, no blurred vision. His oxygen got improving after blood pressure controlled.   Checks x-ray indicated cardiomegaly and pulmonary edema. Nephrology has been consulted, will arrange HD today. His potassium 4.1, creatinine 11 BUN 85. He reported to fatigue. Denies any chest pain no shortness of breath no headache. He reported that he was admitted to [facility] recent today and he was intubated. From care everywhere, patient was admitted to [facility] due to hyper tensive emergency, he was intubated due to hematemesis. Upper EGD was done:' showed small nonbleeding erosion in the body of the stomach from OG tube trauma. \"  He also has been treated for pneumonia and completed antibiotics. Hospital Course :   Pt was admitted for hypertensive emergency. He received urgent HD due to fluid overloaded, Cardene drip was administrated with home medication for  hypertensive emergency. He was admitted to ICU for cardene gtt. With the treatment, his shortness of breath resolved, on discharge, he remains on room air oxygen O2 sats over 97 percent. Abundio Contreras He also received empiric treatment antibiotics for possible pneumonia. ID has been consulted due to history of endocarditis. No further IV antibiotics needed per ID. He also received PT OT. Educated patient to compliant HD and home medication for hypertension treatment. He is off cardene gtt and bp is well controlled per po medication. Discharge planning discussed with patient, pt agrees  with the plan and no questions and concerns at this point.        Activity: Activity as tolerated    Diet: Renal Diet    Follow-up: PCP , HD tomorrow     Disposition: home     Minutes spent on discharge: 45 min       Labs: Results:       Chemistry Recent Labs     08/30/22 0113 08/29/22 0111 08/28/22  0428   GLU 96 100* 83    138 139   K 4.2 4.1 4.3    107 107   CO2 24 23 23   BUN 33* 54* 49*   CREA 6.25* 9.00* 7.89*   CA 8.8 8.7 8.2*   AGAP 7 8 9   BUCR 5* 6* 6*      CBC w/Diff Recent Labs     08/30/22 0113 08/29/22 0111 08/28/22  0428   WBC 5.4 6.1 5.5   RBC 3.86* 3.81* 3.57*   HGB 10.2* 10.2* 9.4*   HCT 31.9* 31.5* 29.1*    237 206   GRANS 67 68 68   LYMPH 14* 14* 15*   EOS 7* 8* 6*      Cardiac Enzymes No results for input(s): CPK, CKND1, WILLIE in the last 72 hours. No lab exists for component: CKRMB, TROIP   Coagulation No results for input(s): PTP, INR, APTT, INREXT in the last 72 hours. Lipid Panel Lab Results   Component Value Date/Time    Cholesterol, total 115 06/02/2022 09:45 AM    HDL Cholesterol 62 (H) 06/02/2022 09:45 AM    LDL, calculated 46 06/02/2022 09:45 AM    VLDL, calculated 7 06/02/2022 09:45 AM    Triglyceride 35 06/02/2022 09:45 AM    CHOL/HDL Ratio 1.9 06/02/2022 09:45 AM      BNP No results for input(s): BNPP in the last 72 hours. Liver Enzymes No results for input(s): TP, ALB, TBIL, AP in the last 72 hours. No lab exists for component: SGOT, GPT, DBIL   Thyroid Studies Lab Results   Component Value Date/Time    TSH 2.38 08/29/2021 01:20 AM          @micro    Significant Diagnostic Studies: XR CHEST PORT    Result Date: 8/28/2022  EXAM: CHEST RADIOGRAPH CLINICAL INDICATION/HISTORY: CHF vs pneumonia   > Additional: None COMPARISON: 8/26/2022. TECHNIQUE: Portable frontal view of the chest _______________ FINDINGS: SUPPORT DEVICES: Right central venous catheter is stable. HEART AND MEDIASTINUM: Cardiac size is enlarged. Remaining mediastinal contours are stable. LUNGS AND PLEURAL SPACES: Interstitial markings are slightly prominent but improved. No consolidation, mass or effusion. BONY THORAX AND SOFT TISSUES: Unremarkable. _______________     1. Cardiomegaly with slight improvement of pulmonary edema. XR CHEST PORT    Result Date: 8/26/2022  EXAM: XR CHEST PORT CLINICAL INDICATION/HISTORY: cp -Additional: None COMPARISON: 6/20/2022 TECHNIQUE: Frontal view of the chest _______________ FINDINGS: HEART AND MEDIASTINUM: Right IJV dialysis catheter tip at the SVC. Cardiomegaly.  LUNGS AND PLEURAL SPACES: Bilateral parenchymal and interstitial opacities. No pneumothorax. BONY THORAX AND SOFT TISSUES: No acute osseous abnormality _______________     Cardiomegaly and pulmonary edema. ECHO ADULT COMPLETE    Result Date: 8/28/2022  Formatting of this result is different from the original.   Left Ventricle: Normal left ventricular systolic function with a visually estimated EF of 60 - 65%. Left ventricle size is normal. Mildly increased wall thickness. Findings consistent with mild concentric hypertrophy. Normal wall motion. Grade I diastolic dysfunction present with normal LV EF. Left Atrium: Left atrium is mildly dilated. Mitral Valve: Valve structure is normal. Moderate annular calcification at the posterior leaflet of the mitral valve. Mild regurgitation. Underlying vegetation cannot be ruled out   Unable to assess RVSP due to inadequate or insignificant tricuspid regurgitation. Consider Transesophageal echocardiogram if clinically indicated.              [facility]     CC: Gregorio Rankin MD

## 2022-08-30 NOTE — PROGRESS NOTES
D/C Plan: Discharge home with [facility] and medicaid transport    CM offered [address] for [facility]. Patient to discharge with [facility] and medical transport through Medicaid. Patient states he has his keys and can get into his house. CM called Arnold Townsend [phone] to set up transport for patient home and for his dialysis. The patient's trip number for the trip home to patient verified address of [address] [place] [address],[address] Esmer Bhatt, [address] [place] [address] is #[phone]; his dialysis trip number  with [facility]it to [employer] at 12:00 PM tomorrow is is #[id number]. Care Management Interventions  PCP Verified by CM: Yes (Pt was seeing Dr. Cherri Snellen. Pt is requesting assistance w/ establishing a new PCP in the [facility]. CMS to assist. )  Mode of Transport at Discharge:  Other (see comment) (family)  Transition of Care Consult (CM Consult): Discharge Planning (Anticipate home vs home / [facility] pending medical progression )  Discharge Durable Medical Equipment: No (Pt has a RW)  Physical Therapy Consult: Yes  Occupational Therapy Consult: Yes  Speech Therapy Consult: No  Support Systems:  (Brother)  Confirm Follow Up Transport: Self  The Plan for Transition of Care is Related to the Following Treatment Goals : home vs home [facility]  The Patient and/or Patient Representative was Provided with a Choice of Provider and Agrees with the Discharge Plan?: Yes (The pt is alert and oriented. )  Discharge Location  Patient Expects to be Discharged to[de-identified] Home

## 2022-08-30 NOTE — PROGRESS NOTES
RENAL CONSULT PROGRESS NOTE   2022    Patient:  Fox Fisher  :  1970  Gender:  male  MRN #:  [id number]    Reason for Consult: Hypertensive urgency and pulmonary edema ,ESRD     Subjective:   Says he is doing fine   Denied nausea and headache  BP improved  No fever and SOB     Objective:    Visit Vitals  BP (!) 156/71   Pulse 95   Temp 98.3 °F (36.8 °C)   Resp 18   Ht 5' 7\" (1.702 m)   Wt 77.6 kg (171 lb)   SpO2 95%   BMI 26.78 kg/m²       Physical Exam:    Pt awake,  alert and comfortable  Lung: clear to auscultation  Ext- right BKA, no edema in LLE     Laboratory Data:    Lab Results   Component Value Date    BUN 33 (H) 2022    BUN 54 (H) 2022    BUN 49 (H) 2022     2022     2022     2022    CO2 24 2022    CO2 23 2022    CO2 23 2022     Lab Results   Component Value Date    WBC 5.4 2022    HGB 10.2 (L) 2022    HCT 31.9 (L) 2022     No components found for: CALCIUM, PHOSPHORUS, MAGNESIUM  Lab Results   Component Value Date    HDL 62 (H) 2022     No results found for: SPECIMENTYP, TURBIDITY, UGLU    Imaging Reveiwed:    CXR: Cardiomegaly and pulmonary edema. Assessment:    Fox Fisher is a 50y.o. year old maleh/o ESRD on HD , non compliant with dialysis  admitted for hypertensive urgency   At present clinically not in fluid overload and BP improved   Antibiotics stopped by ID , no clinical sign of infection     Hypertension   ESRD  Anemia of CKD   Hyperphosphatemia   Secondary Hyperparathyroidism     Plan:      Hypertension- BP above target range   Continue amlodipine 10 mg  and losartan 100 mg , start spironolactone 100 mg daily     ESRD-   Patient examined and labs reviewed, no indication of dialysis today. next HD tomorrow     Anemia- EVIE as indicated     Bone mineral disease  phosphorous was 11 this month- continue sevelamer 1600 mg TID   Has secondary hyperparathyroidism   hectoral 4 mcg with on dialysis day     No barrier for discharge from nephrology            Chelsey Mayberry MD, MD  [facility].- Nephrology

## 2022-09-02 LAB
ATRIAL RATE: 85 BPM
BACTERIA SPEC CULT: NORMAL
BACTERIA SPEC CULT: NORMAL
CALCULATED P AXIS, ECG09: 78 DEGREES
CALCULATED R AXIS, ECG10: -10 DEGREES
CALCULATED T AXIS, ECG11: 112 DEGREES
DIAGNOSIS, 93000: NORMAL
P-R INTERVAL, ECG05: 152 MS
Q-T INTERVAL, ECG07: 418 MS
QRS DURATION, ECG06: 88 MS
QTC CALCULATION (BEZET), ECG08: 497 MS
SERVICE CMNT-IMP: NORMAL
SERVICE CMNT-IMP: NORMAL
VENTRICULAR RATE, ECG03: 85 BPM

## 2023-02-06 ENCOUNTER — APPOINTMENT (OUTPATIENT)
Dept: GENERAL RADIOLOGY | Age: 53
End: 2023-02-06
Attending: EMERGENCY MEDICINE
Payer: MEDICARE

## 2023-02-06 ENCOUNTER — APPOINTMENT (OUTPATIENT)
Dept: CT IMAGING | Age: 53
End: 2023-02-06
Attending: HOSPITALIST
Payer: MEDICARE

## 2023-02-06 ENCOUNTER — HOSPITAL ENCOUNTER (INPATIENT)
Age: 53
LOS: 5 days | Discharge: HOME | End: 2023-02-11
Attending: EMERGENCY MEDICINE | Admitting: HOSPITALIST
Payer: MEDICARE

## 2023-02-06 ENCOUNTER — HOSPITAL ENCOUNTER (INPATIENT)
Facility: HOSPITAL | Age: 53
LOS: 5 days | Discharge: HOME | DRG: 314 | End: 2023-02-11
Attending: HOSPITALIST | Admitting: HOSPITALIST
Payer: MEDICARE

## 2023-02-06 DIAGNOSIS — N18.6 ESRD (END STAGE RENAL DISEASE) (HCC): ICD-10-CM

## 2023-02-06 DIAGNOSIS — I10 PRIMARY HYPERTENSION: ICD-10-CM

## 2023-02-06 DIAGNOSIS — T82.9XXA COMPLICATION ASSOCIATED WITH DIALYSIS CATHETER: Primary | ICD-10-CM

## 2023-02-06 PROBLEM — Z49.01 ADMISSION FOR FITTING AND ADJUSTMENT OF DIALYSIS CATHETER (HCC): Status: ACTIVE | Noted: 2023-02-06

## 2023-02-06 LAB
ALBUMIN SERPL-MCNC: 2.3 G/DL (ref 3.4–5)
ALBUMIN/GLOB SERPL: 0.5 (ref 0.8–1.7)
ALP SERPL-CCNC: 131 U/L (ref 45–117)
ALT SERPL-CCNC: <6 U/L (ref 16–61)
ANION GAP SERPL CALC-SCNC: 7 MMOL/L (ref 3–18)
APTT PPP: 46.7 SEC (ref 23–36.4)
AST SERPL-CCNC: 11 U/L (ref 10–38)
BASOPHILS # BLD: 0.1 K/UL (ref 0–0.1)
BASOPHILS NFR BLD: 1 % (ref 0–2)
BILIRUB SERPL-MCNC: 0.3 MG/DL (ref 0.2–1)
BUN SERPL-MCNC: 51 MG/DL (ref 7–18)
BUN/CREAT SERPL: 5 (ref 12–20)
CALCIUM SERPL-MCNC: 8.6 MG/DL (ref 8.5–10.1)
CHLORIDE SERPL-SCNC: 108 MMOL/L (ref 100–111)
CO2 SERPL-SCNC: 29 MMOL/L (ref 21–32)
CREAT SERPL-MCNC: 9.41 MG/DL (ref 0.6–1.3)
DIFFERENTIAL METHOD BLD: ABNORMAL
EOSINOPHIL # BLD: 0.7 K/UL (ref 0–0.4)
EOSINOPHIL NFR BLD: 8 % (ref 0–5)
ERYTHROCYTE [DISTWIDTH] IN BLOOD BY AUTOMATED COUNT: 24.2 % (ref 11.6–14.5)
GLOBULIN SER CALC-MCNC: 4.6 G/DL (ref 2–4)
GLUCOSE BLD STRIP.AUTO-MCNC: 113 MG/DL (ref 70–110)
GLUCOSE SERPL-MCNC: 113 MG/DL (ref 74–99)
HCT VFR BLD AUTO: 27.1 % (ref 36–48)
HGB BLD-MCNC: 8.5 G/DL (ref 13–16)
IMM GRANULOCYTES # BLD AUTO: 0 K/UL (ref 0–0.04)
IMM GRANULOCYTES NFR BLD AUTO: 0 % (ref 0–0.5)
INR PPP: 1.2 (ref 0.8–1.2)
LYMPHOCYTES # BLD: 0.7 K/UL (ref 0.9–3.6)
LYMPHOCYTES NFR BLD: 8 % (ref 21–52)
MAGNESIUM SERPL-MCNC: 2.6 MG/DL (ref 1.6–2.6)
MCH RBC QN AUTO: 25.3 PG (ref 24–34)
MCHC RBC AUTO-ENTMCNC: 31.4 G/DL (ref 31–37)
MCV RBC AUTO: 80.7 FL (ref 78–100)
MONOCYTES # BLD: 0.8 K/UL (ref 0.05–1.2)
MONOCYTES NFR BLD: 9 % (ref 3–10)
NEUTS SEG # BLD: 6.6 K/UL (ref 1.8–8)
NEUTS SEG NFR BLD: 74 % (ref 40–73)
NRBC # BLD: 0 K/UL (ref 0–0.01)
NRBC BLD-RTO: 0 PER 100 WBC
PLATELET # BLD AUTO: 167 K/UL (ref 135–420)
POTASSIUM SERPL-SCNC: 3.9 MMOL/L (ref 3.5–5.5)
PROT SERPL-MCNC: 6.9 G/DL (ref 6.4–8.2)
PROTHROMBIN TIME: 15.1 SEC (ref 11.5–15.2)
RBC # BLD AUTO: 3.36 M/UL (ref 4.35–5.65)
RBC MORPH BLD: ABNORMAL
SODIUM SERPL-SCNC: 144 MMOL/L (ref 136–145)
WBC # BLD AUTO: 8.9 K/UL (ref 4.6–13.2)

## 2023-02-06 PROCEDURE — 96372 THER/PROPH/DIAG INJ SC/IM: CPT

## 2023-02-06 PROCEDURE — 83735 ASSAY OF MAGNESIUM: CPT

## 2023-02-06 PROCEDURE — 70450 CT HEAD/BRAIN W/O DYE: CPT

## 2023-02-06 PROCEDURE — 71045 X-RAY EXAM CHEST 1 VIEW: CPT

## 2023-02-06 PROCEDURE — 85730 THROMBOPLASTIN TIME PARTIAL: CPT

## 2023-02-06 PROCEDURE — 65270000046 HC RM TELEMETRY

## 2023-02-06 PROCEDURE — 82962 GLUCOSE BLOOD TEST: CPT

## 2023-02-06 PROCEDURE — 85025 COMPLETE CBC W/AUTO DIFF WBC: CPT

## 2023-02-06 PROCEDURE — 93005 ELECTROCARDIOGRAM TRACING: CPT

## 2023-02-06 PROCEDURE — 83036 HEMOGLOBIN GLYCOSYLATED A1C: CPT

## 2023-02-06 PROCEDURE — 80053 COMPREHEN METABOLIC PANEL: CPT

## 2023-02-06 PROCEDURE — 74011250637 HC RX REV CODE- 250/637: Performed by: STUDENT IN AN ORGANIZED HEALTH CARE EDUCATION/TRAINING PROGRAM

## 2023-02-06 PROCEDURE — 74011000250 HC RX REV CODE- 250: Performed by: HOSPITALIST

## 2023-02-06 PROCEDURE — 74011250636 HC RX REV CODE- 250/636: Performed by: HOSPITALIST

## 2023-02-06 PROCEDURE — 74011000250 HC RX REV CODE- 250: Performed by: EMERGENCY MEDICINE

## 2023-02-06 PROCEDURE — 85610 PROTHROMBIN TIME: CPT

## 2023-02-06 PROCEDURE — 9990 CHARGE CONVERSION

## 2023-02-06 PROCEDURE — 99285 EMERGENCY DEPT VISIT HI MDM: CPT

## 2023-02-06 PROCEDURE — 74011000250 HC RX REV CODE- 250: Performed by: SURGERY

## 2023-02-06 PROCEDURE — 74011250637 HC RX REV CODE- 250/637: Performed by: HOSPITALIST

## 2023-02-06 RX ORDER — IBUPROFEN 200 MG
4 TABLET ORAL AS NEEDED
Status: DISCONTINUED | OUTPATIENT
Start: 2023-02-06 | End: 2023-02-11 | Stop reason: HOSPADM

## 2023-02-06 RX ORDER — ONDANSETRON 2 MG/ML
4 INJECTION INTRAMUSCULAR; INTRAVENOUS
Status: DISCONTINUED | OUTPATIENT
Start: 2023-02-06 | End: 2023-02-11 | Stop reason: HOSPADM

## 2023-02-06 RX ORDER — BUMETANIDE 0.25 MG/ML
2 INJECTION INTRAMUSCULAR; INTRAVENOUS ONCE
Status: COMPLETED | OUTPATIENT
Start: 2023-02-06 | End: 2023-02-06

## 2023-02-06 RX ORDER — ACETAMINOPHEN 650 MG/1
650 SUPPOSITORY RECTAL
Status: DISCONTINUED | OUTPATIENT
Start: 2023-02-06 | End: 2023-02-11 | Stop reason: HOSPADM

## 2023-02-06 RX ORDER — AMLODIPINE BESYLATE 5 MG/1
10 TABLET ORAL DAILY
Status: DISCONTINUED | OUTPATIENT
Start: 2023-02-06 | End: 2023-02-09

## 2023-02-06 RX ORDER — LOSARTAN POTASSIUM 50 MG/1
100 TABLET ORAL DAILY
Status: DISCONTINUED | OUTPATIENT
Start: 2023-02-07 | End: 2023-02-06

## 2023-02-06 RX ORDER — LOSARTAN POTASSIUM 50 MG/1
100 TABLET ORAL DAILY
Status: DISCONTINUED | OUTPATIENT
Start: 2023-02-06 | End: 2023-02-09

## 2023-02-06 RX ORDER — DEXTROSE MONOHYDRATE 100 MG/ML
0-250 INJECTION, SOLUTION INTRAVENOUS AS NEEDED
Status: DISCONTINUED | OUTPATIENT
Start: 2023-02-06 | End: 2023-02-11 | Stop reason: HOSPADM

## 2023-02-06 RX ORDER — DOXAZOSIN 1 MG/1
2 TABLET ORAL DAILY
Status: DISCONTINUED | OUTPATIENT
Start: 2023-02-06 | End: 2023-02-07

## 2023-02-06 RX ORDER — LIDOCAINE HYDROCHLORIDE AND EPINEPHRINE 10; 10 MG/ML; UG/ML
20 INJECTION, SOLUTION INFILTRATION; PERINEURAL ONCE
Status: COMPLETED | OUTPATIENT
Start: 2023-02-06 | End: 2023-02-06

## 2023-02-06 RX ORDER — SPIRONOLACTONE 100 MG/1
100 TABLET, FILM COATED ORAL DAILY
Status: DISCONTINUED | OUTPATIENT
Start: 2023-02-07 | End: 2023-02-07

## 2023-02-06 RX ORDER — FACIAL-BODY WIPES
10 EACH TOPICAL DAILY PRN
Status: DISCONTINUED | OUTPATIENT
Start: 2023-02-06 | End: 2023-02-11 | Stop reason: HOSPADM

## 2023-02-06 RX ORDER — ISOSORBIDE MONONITRATE 60 MG/1
60 TABLET, EXTENDED RELEASE ORAL DAILY
Status: DISCONTINUED | OUTPATIENT
Start: 2023-02-07 | End: 2023-02-11 | Stop reason: HOSPADM

## 2023-02-06 RX ORDER — PROMETHAZINE HYDROCHLORIDE 25 MG/1
12.5 TABLET ORAL
Status: DISCONTINUED | OUTPATIENT
Start: 2023-02-06 | End: 2023-02-11 | Stop reason: HOSPADM

## 2023-02-06 RX ORDER — DOXAZOSIN 1 MG/1
2 TABLET ORAL DAILY
Status: DISCONTINUED | OUTPATIENT
Start: 2023-02-07 | End: 2023-02-06

## 2023-02-06 RX ORDER — SODIUM CHLORIDE 0.9 % (FLUSH) 0.9 %
5-40 SYRINGE (ML) INJECTION EVERY 8 HOURS
Status: DISCONTINUED | OUTPATIENT
Start: 2023-02-06 | End: 2023-02-11 | Stop reason: HOSPADM

## 2023-02-06 RX ORDER — AMLODIPINE BESYLATE 5 MG/1
10 TABLET ORAL DAILY
Status: DISCONTINUED | OUTPATIENT
Start: 2023-02-07 | End: 2023-02-06

## 2023-02-06 RX ORDER — PANTOPRAZOLE SODIUM 40 MG/1
40 TABLET, DELAYED RELEASE ORAL
Status: DISCONTINUED | OUTPATIENT
Start: 2023-02-07 | End: 2023-02-11 | Stop reason: HOSPADM

## 2023-02-06 RX ORDER — HYDRALAZINE HYDROCHLORIDE 20 MG/ML
10 INJECTION INTRAMUSCULAR; INTRAVENOUS
Status: DISCONTINUED | OUTPATIENT
Start: 2023-02-06 | End: 2023-02-11 | Stop reason: HOSPADM

## 2023-02-06 RX ORDER — SODIUM CHLORIDE 0.9 % (FLUSH) 0.9 %
5-40 SYRINGE (ML) INJECTION AS NEEDED
Status: DISCONTINUED | OUTPATIENT
Start: 2023-02-06 | End: 2023-02-11 | Stop reason: HOSPADM

## 2023-02-06 RX ORDER — ACETAMINOPHEN 325 MG/1
650 TABLET ORAL
Status: DISCONTINUED | OUTPATIENT
Start: 2023-02-06 | End: 2023-02-11 | Stop reason: HOSPADM

## 2023-02-06 RX ORDER — SEVELAMER CARBONATE 800 MG/1
800 TABLET, FILM COATED ORAL
Status: DISCONTINUED | OUTPATIENT
Start: 2023-02-06 | End: 2023-02-07

## 2023-02-06 RX ADMIN — AMLODIPINE BESYLATE 10 MG: 5 TABLET ORAL at 23:12

## 2023-02-06 RX ADMIN — SEVELAMER CARBONATE 800 MG: 800 TABLET, FILM COATED ORAL at 23:12

## 2023-02-06 RX ADMIN — HYDRALAZINE HYDROCHLORIDE 10 MG: 20 INJECTION INTRAMUSCULAR; INTRAVENOUS at 21:25

## 2023-02-06 RX ADMIN — LOSARTAN POTASSIUM 100 MG: 50 TABLET, FILM COATED ORAL at 21:25

## 2023-02-06 RX ADMIN — LIDOCAINE HYDROCHLORIDE,EPINEPHRINE BITARTRATE 200 MG: 10; .01 INJECTION, SOLUTION INFILTRATION; PERINEURAL at 15:36

## 2023-02-06 RX ADMIN — SODIUM CHLORIDE, PRESERVATIVE FREE 10 ML: 5 INJECTION INTRAVENOUS at 21:30

## 2023-02-06 RX ADMIN — BUMETANIDE 2 MG: 0.25 INJECTION INTRAMUSCULAR; INTRAVENOUS at 23:13

## 2023-02-06 RX ADMIN — DOXAZOSIN 2 MG: 1 TABLET ORAL at 23:12

## 2023-02-06 NOTE — PROGRESS NOTES
51 yo male presented to the emergency room today secondary to bleeding from left internal jugular vein tunneled hemodialysis catheter. Upon further inspection there is a fracture in the catheter under the skin. Patient usual hemodialysis days are Monday, Wednesday, and Friday. His last dialysis 3 days ago without issues. Presently, he denies shortness of breath. Does not feel like he needs dialysis at this time. Exan:  lungs cta, card rrr. Left internal jugular vein tunneled hemodialysis catheter in place. There is no active bleeding at this time. There is blood in catheter. There is a fracture in the catheter a few centimeters under the skin. I have placed a clamp proximal to fracture. There are multiple scars bilateral anterior chest secondary to multiple previous catheter. There is a left arm access that is without thrill or bruit. Recommend leaving this catheter in place secondary to possible difficult access. Admit for close observation of catheter breaking, bleeding,  Plan for tunneled hemodialysis catheter change tomorrow morning.

## 2023-02-06 NOTE — ED TRIAGE NOTES
Patient presents to ED via EMS from dialysis center. Patient is M,W,F. Unable to have dialysis today due to bleeding from dialysis site. Patient denies pain at this time.  Patient's bp is elevated, patient states that is a \"normal\" bp for him prior to dialysis, bleeding controlled via bandage upon arrival

## 2023-02-06 NOTE — Clinical Note
Status[de-identified] INPATIENT [101]   Type of Bed: Telemetry [19]   Cardiac Monitoring Required?: Yes   Inpatient Hospitalization Certified Necessary for the Following Reasons: 3.  Patient receiving treatment that can only be provided in an inpatient setting (further clarification in H&P documentation)   Admitting Diagnosis: Admission for fitting and adjustment of dialysis catheter [facility]) [131269]   Admitting Diagnosis: ESRD (end stage renal disease) [facility]) [[id number]]   Admitting Physician: Annabella Cheney   Attending Physician: Molly Staley [[id number]]   Estimated Length of Stay: 2 Midnights   Discharge Plan[de-identified] Home with Office Follow-up

## 2023-02-06 NOTE — ED PROVIDER NOTES
EMERGENCY DEPARTMENT HISTORY AND PHYSICAL EXAM      Patient Name: Sofi Pan  MRN: [id number]  YOB: 1970  Provider: Italia Arriaga MD  PCP: Aba Espinal MD   Time/Date of evaluation: 11:48 AM 2/6/23      History of Presenting Illness     Chief Complaint   Patient presents with    Hypertension       History Provided By: Patient     History Gonzales Segura):   Sofi Pan is a 50 y.o. male with a PMHx of ESRD (on dialysis M/W/F), diabetes, CHF, hypertension  who presents to the emergency department (room 7) by EMS C/O bleeding from dialysis catheter onset this morning. Associated sxs include elevated blood pressure. Pt denies any other sxs or complaints. Patient states that while he was at dialysis today, they accessed his catheter in his left chest and instilled heparin after which blood came out around the outside of the catheter. Patient states that the stitches that were initially securing the catheter to his skin have come out. Past History     Past Medical History:  Past Medical History:   Diagnosis Date    Chronic kidney disease     Diabetes ([address]     Heart failure ([address]     [facility] [facility] [address]     Hypertension     Sickle cell trait ([facility] [address]        Past Surgical History:  Past Surgical History:   Procedure Laterality Date    HX ORTHOPAEDIC      right BKA 2017    IR DECLOT AV GRAFT PERCUTANEOUS  6/3/2022    IR INSERT NON TUNL CVC OVER 5 YRS  6/1/2022    IR INSERT TUNL CVC W/O PORT OVER 5 YR  6/26/2022       Family History:  No family history on file.     Social History:  Social History     Tobacco Use    Smoking status: Former    Smokeless tobacco: Never   Substance Use Topics    Alcohol use: No    Drug use: No       Medications:  Current Facility-Administered Medications   Medication Dose Route Frequency Provider Last Rate Last Admin    isosorbide mononitrate ER (IMDUR) tablet 60 mg  60 mg Oral DAILY Gay Trejo MD        spironolactone (ALDACTONE) tablet 100 mg  100 mg Oral DAILY Tia Wu Yasmine Brito MD        sevelamer carbonate (RENVELA) tab 800 mg  800 mg Oral TID WITH MEALS Gaston Shrestha MD   800 mg at 02/06/23 2312    pantoprazole (PROTONIX) tablet 40 mg  40 mg Oral ACB Gaston Shrestha MD        hydrALAZINE (APRESOLINE) 20 mg/mL injection 10 mg  10 mg IntraVENous Q6H PRN Gaston Shrestha MD   10 mg at 02/06/23 2125    amLODIPine (NORVASC) tablet 10 mg  10 mg Oral DAILY Gaston Shrestha MD   10 mg at 02/06/23 2312    doxazosin (CARDURA) tablet 2 mg  2 mg Oral DAILY Gaston Shrestha MD   2 mg at 02/06/23 2312    sodium chloride (NS) flush 5-40 mL  5-40 mL IntraVENous Q8H Gaston Shrestha MD   10 mL at 02/06/23 2130    sodium chloride (NS) flush 5-40 mL  5-40 mL IntraVENous PRN Gaston Shrestha MD        acetaminophen (TYLENOL) tablet 650 mg  650 mg Oral Q6H PRN Gaston Shrestha MD        Or    acetaminophen (TYLENOL) suppository 650 mg  650 mg Rectal Q6H PRN Gaston Shrestha MD        bisacodyL (DULCOLAX) suppository 10 mg  10 mg Rectal DAILY PRN Gaston Shrestha MD        promethazine (PHENERGAN) tablet 12.5 mg  12.5 mg Oral Q6H PRN Gaston Shrestha MD        Or    ondansetron [facility]) injection 4 mg  4 mg IntraVENous Q6H PRN Gaston Shrestha MD        losartan (COZAAR) tablet 100 mg  100 mg Oral DAILY José Alston MD   100 mg at 02/06/23 2125    glucose chewable tablet 16 g  4 Tablet Oral PRN Artem Coats MD        glucagon (GLUCAGEN) injection 1 mg  1 mg IntraMUSCular PRN Artem Coats MD        dextrose 10% infusion 0-250 mL  0-250 mL IntraVENous PRN Artem Coats MD           Allergies:   Allergies   Allergen Reactions    Penicillins Rash    Vancomycin Other (comments)     kayla syndrome       Social Determinants of Health:  Social Determinants of Health     Tobacco Use: Medium Risk    Smoking Tobacco Use: Former    Smokeless Tobacco Use: Never    Passive Exposure: Not on file   Alcohol Use: Not on file   Financial Resource Strain: Not on file   Food Insecurity: Not on file   Transportation Needs: Not on file   Physical Activity: Not on file   Stress: Not on file   Social Connections: Not on file   Intimate Partner Violence: Not on file   Depression: Not on file   Housing Stability: Not on file       Review of Systems     Negative except as listed above in HPI. Physical Exam     Vitals:    02/06/23 2016 02/06/23 2124 02/06/23 2312 02/07/23 0104   BP: (!) 221/93 (!) 227/102 (!) 207/101 (!) 193/89   Pulse: 98 (!) 102 (!) 104    Resp: 16  17    Temp: 98.5 °F (36.9 °C)  98 °F (36.7 °C)    SpO2: 92%  95%    Weight:       Height:         Physical Exam  Vitals and nursing note reviewed. Constitutional:       General: He is not in acute distress. Appearance: Normal appearance. HENT:      Head: Normocephalic and atraumatic. Eyes:      Extraocular Movements: Extraocular movements intact. Conjunctiva/sclera: Conjunctivae normal.      Pupils: Pupils are equal, round, and reactive to light. Cardiovascular:      Rate and Rhythm: Normal rate and regular rhythm. Heart sounds: No murmur heard. No friction rub. No gallop. Pulmonary:      Effort: Pulmonary effort is normal.      Breath sounds: Normal breath sounds. No wheezing, rhonchi or rales. Chest:       Abdominal:      General: Abdomen is flat. Bowel sounds are normal.      Palpations: Abdomen is soft. Tenderness: There is no abdominal tenderness. There is no guarding or rebound. Musculoskeletal:         General: Normal range of motion. Right Lower Extremity: Right leg is amputated below knee. Skin:     General: Skin is warm and dry. Neurological:      General: No focal deficit present. Mental Status: He is alert and oriented to person, place, and time.    Psychiatric:         Mood and Affect: Mood normal.         Behavior: Behavior normal.       Diagnostic Study Results     Labs:  Recent Results (from the past 12 hour(s))   GLUCOSE, POC    Collection Time: 02/06/23 11:00 PM   Result Value Ref Range    Glucose (POC) 113 (H) 70 - 110 mg/dL       Radiologic Studies:   CT HEAD WO CONT   Final Result   1. Age-indeterminate infarct in the right inferior cerebellum, which may be   acute to subacute. Recommend MRI for further evaluation. 2. Additional small chronic infarcts in the right arden and right cerebellum. 3. No evidence of acute hemorrhage. XR CHEST PORT   Final Result   1. Enlarged cardiac silhouette, no evidence of pulmonary edema        CT Results  (Last 48 hours)                 02/06/23 1840  CT HEAD WO CONT Final result    Impression:  1. Age-indeterminate infarct in the right inferior cerebellum, which may be   acute to subacute. Recommend MRI for further evaluation. 2. Additional small chronic infarcts in the right arden and right cerebellum. 3. No evidence of acute hemorrhage. Narrative:  EXAM:  CT HEAD WO CONT       INDICATION:   HYPERTENSIVE EMERGENCY       COMPARISON: CT head 6/1/2022. TECHNIQUE: Unenhanced CT of the head was performed using 5 mm images. Brain and   bone windows were generated. CT dose reduction was achieved through use of a   standardized protocol tailored for this examination and automatic exposure   control for dose modulation. FINDINGS:   There is an age-indeterminate infarct in the right inferior cerebellum. There   are additional small chronic infarcts noted in the right arden and right   cerebellum. The ventricles are normal in size and position. Basilar cisterns are   patent. No midline shift. There is no evidence of acute hemorrhage. The paranasal sinuses, mastoid air cells, and middle ears are clear. The orbital   contents are within normal limits. There are no significant osseous or   extracranial soft tissue lesions. CXR Results  (Last 48 hours)                 02/06/23 1332  XR CHEST PORT Final result    Impression:  1. Enlarged cardiac silhouette, no evidence of pulmonary edema       Narrative:  INDICATION:  missed dialysis        EXAM: Single portable view of chest 1323 . Comparison:8/28/2022       Findings: Cardiac silhouette is enlarged. Pulmonary vasculature is not engorged. There are no focal parenchymal opacities, effusions, or pneumothorax. There is a   tunneled left subclavian central line. Vascular stent overlies the proximal left   forearm. Procedures     Procedures    ED Course     11:48 AM I Pamela Azevedo MD) am the first provider for this patient. Initial assessment performed. I reviewed the vital signs, available nursing notes, past medical history, past surgical history, family history and social history. The patients presenting problems have been discussed, and they are in agreement with the care plan formulated and outlined with them. I have encouraged them to ask questions as they arise throughout their visit. Records Reviewed: Nursing Notes    Cardiac Monitor:  Rate: 93 bpm  Rhythm: Sinus Rhythm    Pulse Oximetry Analysis - 92% on RA    Is this patient to be included in the SEP-1 core measure due to severe sepsis or septic shock?  NoExclusion criteria - the patient is NOT to be included for SEP-1 Core Measure due to: 2+ SIRS criteria are not met    MEDICATIONS ADMINISTERED IN THE ED:  Medications   isosorbide mononitrate ER (IMDUR) tablet 60 mg (has no administration in time range)   spironolactone (ALDACTONE) tablet 100 mg (has no administration in time range)   sevelamer carbonate (RENVELA) tab 800 mg (800 mg Oral Given 2/6/23 2312)   pantoprazole (PROTONIX) tablet 40 mg (has no administration in time range)   hydrALAZINE (APRESOLINE) 20 mg/mL injection 10 mg (10 mg IntraVENous Given 2/6/23 2125)   amLODIPine (NORVASC) tablet 10 mg (10 mg Oral Given 2/6/23 2312)   doxazosin (CARDURA) tablet 2 mg (2 mg Oral Given 2/6/23 2312)   sodium chloride (NS) flush 5-40 mL (10 mL IntraVENous Given 2/6/23 2130)   sodium chloride (NS) flush 5-40 mL (has no administration in time range)   acetaminophen (TYLENOL) tablet 650 mg (has no administration in time range)     Or   acetaminophen (TYLENOL) suppository 650 mg (has no administration in time range)   bisacodyL (DULCOLAX) suppository 10 mg (has no administration in time range)   promethazine (PHENERGAN) tablet 12.5 mg (has no administration in time range)     Or   ondansetron (ZOFRAN) injection 4 mg (has no administration in time range)   losartan (COZAAR) tablet 100 mg (100 mg Oral Given 2/6/23 2125)   glucose chewable tablet 16 g (has no administration in time range)   glucagon (GLUCAGEN) injection 1 mg (has no administration in time range)   dextrose 10% infusion 0-250 mL (has no administration in time range)   lidocaine-EPINEPHrine (XYLOCAINE) 1 %-1:100,000 injection 200 mg (200 mg SubCUTAneous Given 2/6/23 1536)   bumetanide (BUMEX) injection 2 mg (2 mg IntraVENous Given 2/6/23 2313)       ED Course as of 02/07/23 0122   Mon Feb 06, 2023   1214 EKG interpretation:  Rhythm: NSR. Rate: 94 bpm; no STEMI  EKG read by Elodia Alvarado MD   [JM]   2428 CONSULT NOTE:   Elodia Alvarado MD spoke with NADIRA Santizo with vascular surgery. He is at [place]. He will be up here in about 30 minutes to take a look at the patient. [JM]   1027 Discussed with Vale Swenson, catheter is cracked. It needs to be removed. There is concern that if the patient goes home the catheter could break apart and bleeding would not be controllable. He is going to clamp the catheter so that the distal portion will not migrate and to help with hemostasis. They will plan on replacing the catheter tomorrow. Please admit to medicine. [JM]   6967 CONSULT NOTE:   Elodia Alvarado MD spoke with Dr. Alicia Hart, nephrology, restart home medications, give Bumex 2 mg iv bid additional blood pressure, admit to hospitalist they will dialyze tomorrow after catheter was placed.  [JM]   7392 CONSULT NOTE:   Elodia Alvarado MD spoke with Dr. Severo Vasquez, who will admit to telemetry   []      ED Course User Index  [JM] Raeann Kemp MD       Medical Decision Making     DDX: Missed dialysis, malfunctioning dialysis catheter, electrolyte disorder, fluid overload    DISCUSSION:  This appears to be a severe conditon. This appears to be an acute condition. 50 y.o. male presenting with bleeding from his dialysis catheter after heparin was instilled in the catheter. In evaluation of the above differential diagnosis, consideration was given to the following tests and treatments. Patient had a normal INR but a slightly elevated PTT. Hemoglobin was slightly below normal for him however was not at a level consistent with needing a transfusion electrolytes were as expected for a compliant dialysis patient. His magnesium was within normal range. His potassium was also within normal range. Creatinine is markedly elevated as is consistent with a dialysis patient. After inspecting the catheter and seeing a fair amount of venous seepage around the catheter and the fact that it was not secured to the skin with sutures as it should have been I contacted vascular surgery prior to making any other manipulation of the catheter. This consult was slightly delayed due to my unavailability while caring for critically ill patient in a different room. Once vascular surgery was consulted they arrived to the ED and under 30 minutes and evaluated this patient's catheter. They were able to assess that the catheter had actually cracked above the level of insertion of the skin. There is concern that if this patient were to go home that the catheter could break off entirely and cause bleeding in a place that he would be unable to place direct pressure onto it additionally although less likely the catheter could be displaced in the patient's body if the hub is no longer attached.   I discussed with the vascular PA and we agree that it would be best to place a hemostat clamp on the catheter distal to the break in order to prevent it from migrating into the vasculature and also to ensure that the break would not be leaking any blood. Plan for the replacement of the vascular catheter tomorrow. We will leave it in place until that time as we do not want to disrupt the track. This patient has had several vascular access placements in the past including a failed AV fistula graft in his left arm and a right dialysis catheter which has been removed. It is prudent to try and preserve as much of his nonstenotic access possible. I discussed with the hospitalist who will admit to the mathew and make n.p.o. after midnight. The decision to perform testing and results were discussed with the patient. I discussed each of these tests and considerations with the patient. The patient agrees with the plan of admission. Additional Considerations: The following Chronic Conditions impacted the patient's care: End-stage renal disease on dialysis because multiple access sites and the malfunctioning catheter is reason for being in the ED today, hypertension at this was worsening his bleeding. Critical Care Time:     I have spent 33 minutes minutes of critical care time involved in lab review, consultations with specialist, family decision-making, and documentation. This time does not include time spent on separately billable procedures. During this entire length of time, I was immediately available to the patient. Critical Care: The reason for providing this level of medical care for this critically ill patient was due a critical illness that impaired one or more vital organ systems such that there was a high probability of imminent or life-threatening deterioration in the patient's condition. This care involved high complexity decision making to assess, manipulate, and support vital system functions, to treat this degreee vital organ system failure and to prevent further life-threatening deterioration of the patients condition.     Lyna Kayser, MD    Diagnosis and Disposition     Critical Care Time: 33 minutes    Core Measures:  For Hospitalized Patients:    1. Hospitalization Decision Time:  The decision to hospitalize the patient was made by Jo-Ann Polo MD at 4:27 PM on 2/6/2023    2. Aspirin: Aspirin was not given because the patient did not present with a stroke at the time of their Emergency Department evaluation    5:52 PM Patient is being admitted to the hospital by Dr. Rayne Schaumann. The results of their tests and reasons for their admission have been discussed with them and/or available family. They convey agreement and understanding for the need to be admitted and for their admission diagnosis. CONDITIONS ON ADMISSION:  Sepsis is not present at the time of admission. Deep Vein Thrombosis is not present at the time of admission. Thrombosis is not present at the time of admission. Urinary Tract Infection is not present at the time of admission. Pneumonia is not present at the time of admission. MRSA is not present at the time of admission. Wound infection is not present at the time of admission. Pressure Ulcer is not present at the time of admission. CLINICAL IMPRESSION:    1. Complication associated with dialysis catheter    2. ESRD (end stage renal disease) ([facility] [address]    3. Primary hypertension        PLAN:  Admit to telemetry    I Jo-Ann Polo MD am the primary clinician of record. Dragon Disclaimer     Please note that this dictation was completed with Diana, the computer voice recognition software. Quite often unanticipated grammatical, syntax, homophones, and other interpretive errors are inadvertently transcribed by the computer software. Please disregard these errors. Please excuse any errors that have escaped final proofreading.     Jo-Ann Polo MD

## 2023-02-06 NOTE — H&P
History & Physical    Patient: Celestino Chilel MRN: [id number]  CSN: [id number]    YOB: 1970  Age: 50 y.o. Sex: male      DOA: 2/6/2023  Primary Care Provider:  Alcides Sauceda MD      Assessment/Plan     Hospital Problems  Date Reviewed: 6/24/2022            Codes Class Noted POA    Admission for fitting and adjustment of dialysis catheter [facility]) ICD-10-CM: Z49.01  ICD-9-CM: V56.1  2/6/2023 Unknown        ESRD (end stage renal disease) ([facility] ICD-10-CM: N18.6  ICD-9-CM: 585.6  2/6/2023 Unknown        History of hypoglycemia ICD-10-CM: Z86.39  ICD-9-CM: V12.29  Unknown Unknown        Hypertensive emergency ICD-10-CM: I16.1  ICD-9-CM: 401.9  8/26/2022 Yes        Hypertension ICD-10-CM: I10  ICD-9-CM: 401.9  6/1/2022 Yes        ESRD (end stage renal disease) on dialysis [facility]) ICD-10-CM: N18.6, Z99.2  ICD-9-CM: 585.6, V45.11  1/29/2020 Yes        Hx of BKA, right ([facility] ICD-10-CM: Z89.511  ICD-9-CM: V49.75  1/29/2020 Yes             Admit to tele    hypertensive emergency  Recheck blood pressure improving, will give home medication  CT head      Bleeding from HD catheter  Stopped by clamping the catheter   Will change tdc tomorrow per vascular team   Continue h/h monitoring      End-stage renal disease on HD  Nephrology has been consulted, need vascular surgeon to fix HD catheter  Denies any shortness of breath, potassium is okay  We will have HD tomorrow     Anemia -due to esrd , no bleeding reported   Will continue monitoring h/h       Sickle cell trait        History of Hypoglycemia  Put hypoglycemia protocol      Right BKA, fall precaution     Full code   Please note that this dictation was completed with [employer], the [email or web address] voice recognition software. Quite often unanticipated grammatical, syntax, homophones, and other interpretive errors are inadvertently transcribed by the computer software. Please disregard these errors.   Please excuse any errors that have escaped final proofreading    Estimate  length of stay : 1-2 days     DVT : scd ppi proph  CC: bleeding from tdc catheter        HPI:     Afshan Valencia is a 50 y.o. male with history of hypertension, hypoglycemia presented to ER due to bleeding from [facility] catheter. He noted some blood on his shirt on Sunday while he was at [employer]. It  stopped by its self. He went to HD clinic today, blood came  out from the catheter. He was sent to ER immediately. Vascular surgeon has been consulted. Bleeding was stopped with a clamp. Vascular is planning to change catheter tomorrow. He also presented hypertension in ER. He denies any chest pain no shortness of breath. Chest x-ray no evidence of pulmonary edema. Visit Vitals  BP (!) 129/111   Pulse 93   Temp 97.6 °F (36.4 °C)   Resp 16   Ht 5' 7\" (1.702 m)   Wt 75 kg (165 lb 5.5 oz)   SpO2 97%   BMI 25.90 kg/m²      O2 Device: None (Room air)      Past Medical History:   Diagnosis Date    Chronic kidney disease     Diabetes ([facility]     Heart failure (HCC)     Hemodialysis patient ([facility]     Hypertension     Sickle cell trait ([facility]        Past Surgical History:   Procedure Laterality Date    HX ORTHOPAEDIC      right BKA 2017    IR DECLOT AV GRAFT PERCUTANEOUS  6/3/2022    IR INSERT NON TUNL CVC OVER 5 YRS  6/1/2022    IR INSERT TUNL CVC W/O PORT OVER 5 YR  6/26/2022     Fhx htn parents   No family history on file. Social History     Socioeconomic History    Marital status: LEGALLY    Tobacco Use    Smoking status: Former    Smokeless tobacco: Never   Substance and Sexual Activity    Alcohol use: No    Drug use: No       Prior to Admission medications    Medication Sig Start Date End Date Taking? Authorizing Provider   amLODIPine (NORVASC) 10 mg tablet Take 1 Tablet by mouth daily. 8/31/22   Julian Huerta MD   losartan (COZAAR) 100 mg tablet Take 1 Tablet by mouth daily. 8/31/22   Julian Huerta MD   pantoprazole (PROTONIX) 40 mg tablet Take 1 Tablet by mouth Daily (before breakfast). 8/31/22   Tyron Dai MD   spironolactone (ALDACTONE) 100 mg tablet Take 1 Tablet by mouth daily. 8/31/22   Tyron Dai MD   calcitRIOL (ROCALTROL) 0.25 mcg capsule Take 1 Capsule by mouth every Monday, Wednesday, Friday. 7/6/22   Anirudh Arambula MD   epoetin delonte-epbx (RETACRIT) 20,000 unit/mL injection 1 mL by SubCUTAneous route every Monday, Wednesday, Friday. Indications: anemia due to kidney failure 7/6/22   Ainrudh Arambula MD   isosorbide mononitrate ER (IMDUR) 60 mg CR tablet Take 1 Tablet by mouth daily. 6/7/22   Tyron Dai MD   cyanocobalamin 1,000 mcg tablet Take 1 Tablet by mouth daily. 6/7/22   Tyron Dai MD   aspirin delayed-release 81 mg tablet Take 1 Tablet by mouth daily. 9/7/21   Victor Manuel Rudolph DO   doxazosin (CARDURA) 4 mg tablet Take 2 mg by mouth daily. Provider, Historical   sodium hypochlorite 0.0125% (DAKINS SOLUTION) Apply 1 mL to affected area two (2) times a week. Provider, Historical   omeprazole (PRILOSEC) 40 mg capsule Take 40 mg by mouth daily. Provider, Historical   sevelamer carbonate (RENVELA) 800 mg tab tab Take 800 mg by mouth three (3) times daily (with meals). Provider, Historical   sodium bicarbonate 650 mg tablet Take 650 mg by mouth three (3) times daily. Patient not taking: Reported on 8/26/2022    Provider, Historical       Allergies   Allergen Reactions    Penicillins Rash    Vancomycin Other (comments)     kayla syndrome       Review of Systems  Gen: No fever, chills, malaise, weight loss/gain. Heent: No headache, rhinorrhea, epistaxis, ear pain, hearing loss, sinus pain, neck pain/stiffness, sore throat. Heart: No chest pain, palpitations, FU, pnd, or orthopnea. Resp: No cough, hemoptysis, wheezing and shortness of breath. GI: No nausea, vomiting, diarrhea, constipation, melena or hematochezia. : No urinary obstruction, dysuria or hematuria. Derm: No rash, new skin lesion or pruritis. Musc/skeletal: no bone or joint complains.   Vasc: No edema, cyanosis or claudication. Endo: No heat/cold intolerance, no polyuria,polydipsia or polyphagia. Neuro: No unilateral weakness, numbness, tingling. No seizures. Heme: No easy bruising or bleeding. Physical Exam:     Physical Exam:  Visit Vitals  BP (!) 129/111   Pulse 93   Temp 97.6 °F (36.4 °C)   Resp 16   Ht 5' 7\" (1.702 m)   Wt 75 kg (165 lb 5.5 oz)   SpO2 97%   BMI 25.90 kg/m²      O2 Device: None (Room air)    Temp (24hrs), Av.6 °F (36.4 °C), Min:97.6 °F (36.4 °C), Max:97.6 °F (36.4 °C)    No intake/output data recorded. No intake/output data recorded. General:  Awake, cooperative, no distress. Head:  Normocephalic, without obvious abnormality, atraumatic. Eyes:  Conjunctivae/corneas clear, sclera anicteric, PERRL, EOMs intact. Nose: Nares normal. No drainage or sinus tenderness. Throat: Lips, mucosa, and tongue normal. .   Neck: Supple, symmetrical, trachea midline, no adenopathy. Lungs:   Clear to auscultation bilaterally. Tdc noted covered with gauze and clamp noted        Heart:  Regular rate and rhythm, S1, S2 normal, no murmur, click, rub or gallop. Abdomen: Soft, non-tender. Bowel sounds normal. No masses,  No organomegaly. Extremities: Extremities normal, rt bka  no cyanosis or edema. Pulses: 2+ and symmetric all extremities. Skin: Skin color-pink, texture, turgor normal. No rashes or lesions. Capillary refill normal    Neurologic: CNII-XII intact. No focal motor or sensory deficit.        Labs Reviewed:    BMP:   Lab Results   Component Value Date/Time     2023 12:46 PM    K 3.9 2023 12:46 PM     2023 12:46 PM    CO2 29 2023 12:46 PM    AGAP 7 2023 12:46 PM     (H) 2023 12:46 PM    BUN 51 (H) 2023 12:46 PM    CREA 9.41 (H) 2023 12:46 PM     CMP:   Lab Results   Component Value Date/Time     2023 12:46 PM    K 3.9 2023 12:46 PM     2023 12:46 PM    CO2 29 02/06/2023 12:46 PM    AGAP 7 02/06/2023 12:46 PM     (H) 02/06/2023 12:46 PM    BUN 51 (H) 02/06/2023 12:46 PM    CREA 9.41 (H) 02/06/2023 12:46 PM    CA 8.6 02/06/2023 12:46 PM    MG 2.6 02/06/2023 12:46 PM    ALB 2.3 (L) 02/06/2023 12:46 PM    TP 6.9 02/06/2023 12:46 PM    GLOB 4.6 (H) 02/06/2023 12:46 PM    AGRAT 0.5 (L) 02/06/2023 12:46 PM    ALT <6 (L) 02/06/2023 12:46 PM     CBC:   Lab Results   Component Value Date/Time    WBC 8.9 02/06/2023 12:46 PM    HGB 8.5 (L) 02/06/2023 12:46 PM    HCT 27.1 (L) 02/06/2023 12:46 PM     02/06/2023 12:46 PM     All Cardiac Markers in the last 24 hours: No results found for: CPK, CK, CKMMB, CKMB, RCK3, CKMBT, CKNDX, CKND1, WILLIE, TROPT, TROIQ, CHANDA, TROPT, TNIPOC, BNP, BNPP  Recent Glucose Results:   Lab Results   Component Value Date/Time     (H) 02/06/2023 12:46 PM     ABG: No results found for: PH, PHI, PCO2, PCO2I, PO2, PO2I, HCO3, HCO3I, FIO2, FIO2I  COAGS:   Lab Results   Component Value Date/Time    APTT 46.7 (H) 02/06/2023 12:46 PM    PTP 15.1 02/06/2023 12:46 PM    INR 1.2 02/06/2023 12:46 PM     Liver Panel:   Lab Results   Component Value Date/Time    ALB 2.3 (L) 02/06/2023 12:46 PM    TP 6.9 02/06/2023 12:46 PM    GLOB 4.6 (H) 02/06/2023 12:46 PM    AGRAT 0.5 (L) 02/06/2023 12:46 PM    ALT <6 (L) 02/06/2023 12:46 PM     (H) 02/06/2023 12:46 PM     Pancreatic Markers: No results found for: AMYLPOCT, AML, LIPPOCT, LPSE    XR CHEST PORT    Result Date: 2/6/2023  INDICATION:  missed dialysis EXAM: Single portable view of chest 1323 . Comparison:8/28/2022 Findings: Cardiac silhouette is enlarged. Pulmonary vasculature is not engorged. There are no focal parenchymal opacities, effusions, or pneumothorax. There is a tunneled left subclavian central line. Vascular stent overlies the proximal left forearm.      1. Enlarged cardiac silhouette, no evidence of pulmonary edema    Procedures/imaging: see electronic medical records for all procedures/Xrays and details which were not copied into this note but were reviewed prior to creation of Hugo Godwin MD, Internal Medicine     CC: Aba Espinal MD

## 2023-02-07 ENCOUNTER — APPOINTMENT (OUTPATIENT)
Dept: MRI IMAGING | Age: 53
End: 2023-02-07
Attending: HOSPITALIST
Payer: MEDICARE

## 2023-02-07 ENCOUNTER — APPOINTMENT (OUTPATIENT)
Dept: INTERVENTIONAL RADIOLOGY/VASCULAR | Age: 53
End: 2023-02-07
Attending: SURGERY
Payer: MEDICARE

## 2023-02-07 LAB
ANION GAP SERPL CALC-SCNC: 11 MMOL/L (ref 3–18)
BASOPHILS # BLD: 0.1 K/UL (ref 0–0.1)
BASOPHILS NFR BLD: 1 % (ref 0–2)
BUN SERPL-MCNC: 55 MG/DL (ref 7–18)
BUN/CREAT SERPL: 5 (ref 12–20)
CALCIUM SERPL-MCNC: 8.3 MG/DL (ref 8.5–10.1)
CHLORIDE SERPL-SCNC: 105 MMOL/L (ref 100–111)
CO2 SERPL-SCNC: 24 MMOL/L (ref 21–32)
CREAT SERPL-MCNC: 10.3 MG/DL (ref 0.6–1.3)
DIFFERENTIAL METHOD BLD: ABNORMAL
EOSINOPHIL # BLD: 0.7 K/UL (ref 0–0.4)
EOSINOPHIL NFR BLD: 9 % (ref 0–5)
ERYTHROCYTE [DISTWIDTH] IN BLOOD BY AUTOMATED COUNT: 24.1 % (ref 11.6–14.5)
EST. AVERAGE GLUCOSE BLD GHB EST-MCNC: ABNORMAL MG/DL
GLUCOSE BLD STRIP.AUTO-MCNC: 105 MG/DL (ref 70–110)
GLUCOSE BLD STRIP.AUTO-MCNC: 80 MG/DL (ref 70–110)
GLUCOSE SERPL-MCNC: 67 MG/DL (ref 74–99)
HBA1C MFR BLD: <3.8 % (ref 4.2–5.6)
HCT VFR BLD AUTO: 27.1 % (ref 36–48)
HGB BLD-MCNC: 8.6 G/DL (ref 13–16)
IMM GRANULOCYTES # BLD AUTO: 0 K/UL (ref 0–0.04)
IMM GRANULOCYTES NFR BLD AUTO: 0 % (ref 0–0.5)
LYMPHOCYTES # BLD: 0.8 K/UL (ref 0.9–3.6)
LYMPHOCYTES NFR BLD: 10 % (ref 21–52)
MAGNESIUM SERPL-MCNC: 2.7 MG/DL (ref 1.6–2.6)
MCH RBC QN AUTO: 25.6 PG (ref 24–34)
MCHC RBC AUTO-ENTMCNC: 31.7 G/DL (ref 31–37)
MCV RBC AUTO: 80.7 FL (ref 78–100)
MONOCYTES # BLD: 0.9 K/UL (ref 0.05–1.2)
MONOCYTES NFR BLD: 11 % (ref 3–10)
NEUTS SEG # BLD: 5.2 K/UL (ref 1.8–8)
NEUTS SEG NFR BLD: 68 % (ref 40–73)
NRBC # BLD: 0 K/UL (ref 0–0.01)
NRBC BLD-RTO: 0 PER 100 WBC
PHOSPHATE SERPL-MCNC: 6.9 MG/DL (ref 2.5–4.9)
PLATELET # BLD AUTO: 166 K/UL (ref 135–420)
POTASSIUM SERPL-SCNC: 3.8 MMOL/L (ref 3.5–5.5)
RBC # BLD AUTO: 3.36 M/UL (ref 4.35–5.65)
SODIUM SERPL-SCNC: 140 MMOL/L (ref 136–145)
WBC # BLD AUTO: 7.6 K/UL (ref 4.6–13.2)

## 2023-02-07 PROCEDURE — 70551 MRI BRAIN STEM W/O DYE: CPT

## 2023-02-07 PROCEDURE — 83735 ASSAY OF MAGNESIUM: CPT

## 2023-02-07 PROCEDURE — 65270000046 HC RM TELEMETRY

## 2023-02-07 PROCEDURE — 82962 GLUCOSE BLOOD TEST: CPT

## 2023-02-07 PROCEDURE — 36415 COLL VENOUS BLD VENIPUNCTURE: CPT

## 2023-02-07 PROCEDURE — 36558 INSERT TUNNELED CV CATH: CPT

## 2023-02-07 PROCEDURE — 74011250637 HC RX REV CODE- 250/637: Performed by: HOSPITALIST

## 2023-02-07 PROCEDURE — 80048 BASIC METABOLIC PNL TOTAL CA: CPT

## 2023-02-07 PROCEDURE — 0JH63XZ INSERTION OF TUNNELED VASCULAR ACCESS DEVICE INTO CHEST SUBCUTANEOUS TISSUE AND FASCIA, PERCUTANEOUS APPROACH: ICD-10-PCS | Performed by: SURGERY

## 2023-02-07 PROCEDURE — 74011250636 HC RX REV CODE- 250/636: Performed by: SURGERY

## 2023-02-07 PROCEDURE — 02HV33Z INSERTION OF INFUSION DEVICE INTO SUPERIOR VENA CAVA, PERCUTANEOUS APPROACH: ICD-10-PCS | Performed by: SURGERY

## 2023-02-07 PROCEDURE — 85025 COMPLETE CBC W/AUTO DIFF WBC: CPT

## 2023-02-07 PROCEDURE — 77001 FLUOROGUIDE FOR VEIN DEVICE: CPT

## 2023-02-07 PROCEDURE — 74011250637 HC RX REV CODE- 250/637: Performed by: STUDENT IN AN ORGANIZED HEALTH CARE EDUCATION/TRAINING PROGRAM

## 2023-02-07 PROCEDURE — 84100 ASSAY OF PHOSPHORUS: CPT

## 2023-02-07 PROCEDURE — 76937 US GUIDE VASCULAR ACCESS: CPT

## 2023-02-07 PROCEDURE — 74011000250 HC RX REV CODE- 250: Performed by: SURGERY

## 2023-02-07 PROCEDURE — 74011000250 HC RX REV CODE- 250: Performed by: HOSPITALIST

## 2023-02-07 PROCEDURE — 74011250636 HC RX REV CODE- 250/636: Performed by: STUDENT IN AN ORGANIZED HEALTH CARE EDUCATION/TRAINING PROGRAM

## 2023-02-07 PROCEDURE — 9990 CHARGE CONVERSION

## 2023-02-07 RX ORDER — SEVELAMER CARBONATE 800 MG/1
800 TABLET, FILM COATED ORAL
Status: DISCONTINUED | OUTPATIENT
Start: 2023-02-07 | End: 2023-02-11 | Stop reason: HOSPADM

## 2023-02-07 RX ORDER — LIDOCAINE HYDROCHLORIDE 10 MG/ML
1-40 INJECTION INFILTRATION; PERINEURAL
Status: COMPLETED | OUTPATIENT
Start: 2023-02-07 | End: 2023-02-07

## 2023-02-07 RX ORDER — SPIRONOLACTONE 25 MG/1
25 TABLET ORAL DAILY
Status: DISCONTINUED | OUTPATIENT
Start: 2023-02-07 | End: 2023-02-10

## 2023-02-07 RX ORDER — HEPARIN SODIUM 1000 [USP'U]/ML
10000 INJECTION, SOLUTION INTRAVENOUS; SUBCUTANEOUS
Status: COMPLETED | OUTPATIENT
Start: 2023-02-07 | End: 2023-02-07

## 2023-02-07 RX ORDER — FENTANYL CITRATE 50 UG/ML
25-100 INJECTION, SOLUTION INTRAMUSCULAR; INTRAVENOUS
Status: DISCONTINUED | OUTPATIENT
Start: 2023-02-07 | End: 2023-02-10

## 2023-02-07 RX ORDER — NALOXONE HYDROCHLORIDE 0.4 MG/ML
0.1 INJECTION, SOLUTION INTRAMUSCULAR; INTRAVENOUS; SUBCUTANEOUS
Status: DISCONTINUED | OUTPATIENT
Start: 2023-02-07 | End: 2023-02-10

## 2023-02-07 RX ORDER — LIDOCAINE HYDROCHLORIDE 10 MG/ML
1-20 INJECTION, SOLUTION EPIDURAL; INFILTRATION; INTRACAUDAL; PERINEURAL
Status: ACTIVE | OUTPATIENT
Start: 2023-02-07 | End: 2023-02-08

## 2023-02-07 RX ORDER — HEPARIN SODIUM 200 [USP'U]/100ML
500 INJECTION, SOLUTION INTRAVENOUS
Status: COMPLETED | OUTPATIENT
Start: 2023-02-07 | End: 2023-02-07

## 2023-02-07 RX ORDER — FLUMAZENIL 0.1 MG/ML
0.2 INJECTION INTRAVENOUS
Status: DISCONTINUED | OUTPATIENT
Start: 2023-02-07 | End: 2023-02-10

## 2023-02-07 RX ORDER — CARVEDILOL 12.5 MG/1
25 TABLET ORAL 2 TIMES DAILY
Status: DISCONTINUED | OUTPATIENT
Start: 2023-02-07 | End: 2023-02-09

## 2023-02-07 RX ORDER — CLINDAMYCIN PHOSPHATE 900 MG/50ML
900 INJECTION, SOLUTION INTRAVENOUS
Status: COMPLETED | OUTPATIENT
Start: 2023-02-07 | End: 2023-02-07

## 2023-02-07 RX ORDER — SODIUM CHLORIDE 0.9 % (FLUSH) 0.9 %
5-40 SYRINGE (ML) INJECTION AS NEEDED
Status: DISCONTINUED | OUTPATIENT
Start: 2023-02-07 | End: 2023-02-10

## 2023-02-07 RX ORDER — DEXTROSE MONOHYDRATE AND SODIUM CHLORIDE 5; .45 G/100ML; G/100ML
25 INJECTION, SOLUTION INTRAVENOUS CONTINUOUS
Status: DISPENSED | OUTPATIENT
Start: 2023-02-07 | End: 2023-02-08

## 2023-02-07 RX ORDER — LIDOCAINE HYDROCHLORIDE AND EPINEPHRINE 10; 10 MG/ML; UG/ML
1-20 INJECTION, SOLUTION INFILTRATION; PERINEURAL
Status: ACTIVE | OUTPATIENT
Start: 2023-02-07 | End: 2023-02-08

## 2023-02-07 RX ORDER — DOXAZOSIN 1 MG/1
2 TABLET ORAL 2 TIMES DAILY
Status: DISCONTINUED | OUTPATIENT
Start: 2023-02-07 | End: 2023-02-11 | Stop reason: HOSPADM

## 2023-02-07 RX ORDER — SODIUM CHLORIDE 0.9 % (FLUSH) 0.9 %
5-40 SYRINGE (ML) INJECTION EVERY 8 HOURS
Status: DISCONTINUED | OUTPATIENT
Start: 2023-02-07 | End: 2023-02-10

## 2023-02-07 RX ORDER — DOXERCALCIFEROL 4 UG/2ML
4 INJECTION INTRAVENOUS ONCE
Status: COMPLETED | OUTPATIENT
Start: 2023-02-07 | End: 2023-02-07

## 2023-02-07 RX ORDER — MIDAZOLAM HYDROCHLORIDE 1 MG/ML
.5-2 INJECTION, SOLUTION INTRAMUSCULAR; INTRAVENOUS
Status: DISCONTINUED | OUTPATIENT
Start: 2023-02-07 | End: 2023-02-10

## 2023-02-07 RX ADMIN — LIDOCAINE HYDROCHLORIDE 36 ML: 10 INJECTION, SOLUTION INFILTRATION; PERINEURAL at 15:44

## 2023-02-07 RX ADMIN — SODIUM CHLORIDE, PRESERVATIVE FREE 10 ML: 5 INJECTION INTRAVENOUS at 23:27

## 2023-02-07 RX ADMIN — SEVELAMER CARBONATE 800 MG: 800 TABLET, FILM COATED ORAL at 18:57

## 2023-02-07 RX ADMIN — HEPARIN SODIUM 6000 UNITS: 1000 INJECTION INTRAVENOUS; SUBCUTANEOUS at 15:41

## 2023-02-07 RX ADMIN — CLINDAMYCIN PHOSPHATE 900 MG: 900 INJECTION, SOLUTION INTRAVENOUS at 14:13

## 2023-02-07 RX ADMIN — DEXTROSE AND SODIUM CHLORIDE 25 ML/HR: 5; 450 INJECTION, SOLUTION INTRAVENOUS at 12:56

## 2023-02-07 RX ADMIN — ISOSORBIDE MONONITRATE 60 MG: 60 TABLET, EXTENDED RELEASE ORAL at 10:13

## 2023-02-07 RX ADMIN — PANTOPRAZOLE SODIUM 40 MG: 40 TABLET, DELAYED RELEASE ORAL at 06:35

## 2023-02-07 RX ADMIN — FENTANYL CITRATE 25 MCG: 50 INJECTION, SOLUTION INTRAMUSCULAR; INTRAVENOUS at 15:28

## 2023-02-07 RX ADMIN — CARVEDILOL 25 MG: 12.5 TABLET, FILM COATED ORAL at 12:53

## 2023-02-07 RX ADMIN — SODIUM CHLORIDE, PRESERVATIVE FREE 10 ML: 5 INJECTION INTRAVENOUS at 14:00

## 2023-02-07 RX ADMIN — DOXERCALCIFEROL 4 MCG: 4 INJECTION, SOLUTION INTRAVENOUS at 12:53

## 2023-02-07 RX ADMIN — AMLODIPINE BESYLATE 10 MG: 5 TABLET ORAL at 10:13

## 2023-02-07 RX ADMIN — SPIRONOLACTONE 25 MG: 100 TABLET ORAL at 10:12

## 2023-02-07 RX ADMIN — HEPARIN SODIUM IN SODIUM CHLORIDE 1000 UNITS: 200 INJECTION INTRAVENOUS at 15:40

## 2023-02-07 RX ADMIN — FENTANYL CITRATE 25 MCG: 50 INJECTION, SOLUTION INTRAMUSCULAR; INTRAVENOUS at 14:56

## 2023-02-07 RX ADMIN — DOXAZOSIN 2 MG: 1 TABLET ORAL at 10:12

## 2023-02-07 NOTE — PROGRESS NOTES
Patient educated on procedure and moderate sedation. Time for questions provided. NPO status verified. Anticoagulations medications verified with patient. Problem: Potential for Falls  Goal: Patient will remain free of falls  Description: INTERVENTIONS:  - Assess patient frequently for physical needs  -  Identify cognitive and physical deficits and behaviors that affect risk of falls    -  [place] fall precautions as indicated by assessment   - Educate patient/family on patient safety including physical limitations  - Instruct patient to call for assistance with activity based on assessment  - Modify environment to reduce risk of injury  - Consider OT/PT consult to assist with strengthening/mobility  Outcome: Progressing     Problem: PAIN - ADULT  Goal: Verbalizes/displays adequate comfort level or baseline comfort level  Description: Interventions:  - Encourage patient to monitor pain and request assistance  - Assess pain using appropriate pain scale  - Administer analgesics based on type and severity of pain and evaluate response  - Implement non-pharmacological measures as appropriate and evaluate response  - Consider cultural and social influences on pain and pain management  - Notify physician/advanced practitioner if interventions unsuccessful or patient reports new pain  Outcome: Progressing     Problem: INFECTION - ADULT  Goal: Absence or prevention of progression during hospitalization  Description: INTERVENTIONS:  - Assess and monitor for signs and symptoms of infection  - Monitor lab/diagnostic results  - Monitor all insertion sites, i e  indwelling lines, tubes, and drains  - Monitor endotracheal if appropriate and nasal secretions for changes in amount and color  - [place] appropriate cooling/warming therapies per order  - Administer medications as ordered  - Instruct and encourage patient and family to use good hand hygiene technique  - Identify and instruct in appropriate isolation precautions for identified infection/condition  Outcome: Progressing  Goal: Absence of fever/infection during neutropenic period  Description: INTERVENTIONS:  - Monitor WBC    Outcome: Progressing     Problem: SAFETY ADULT  Goal: Maintain or return to baseline ADL function  Description: INTERVENTIONS:  -  Assess patient's ability to carry out ADLs; assess patient's baseline for ADL function and identify physical deficits which impact ability to perform ADLs (bathing, care of mouth/teeth, toileting, grooming, dressing, etc )  - Assess/evaluate cause of self-care deficits   - Assess range of motion  - Assess patient's mobility; develop plan if impaired  - Assess patient's need for assistive devices and provide as appropriate  - Encourage maximum independence but intervene and supervise when necessary  - Involve family in performance of ADLs  - Assess for home care needs following discharge   - Consider OT consult to assist with ADL evaluation and planning for discharge  - Provide patient education as appropriate  Outcome: Progressing  Goal: Maintain or return mobility status to optimal level  Description: INTERVENTIONS:  - Assess patient's baseline mobility status (ambulation, transfers, stairs, etc )    - Identify cognitive and physical deficits and behaviors that affect mobility  - Identify mobility aids required to assist with transfers and/or ambulation (gait belt, sit-to-stand, lift, walker, cane, etc )  - [place] fall precautions as indicated by assessment  - Record patient progress and toleration of activity level on Mobility SBAR; progress patient to next Phase/Stage  - Instruct patient to call for assistance with activity based on assessment  - Consider rehabilitation consult to assist with strengthening/weightbearing, etc   Outcome: Progressing     Problem: DISCHARGE PLANNING  Goal: Discharge to home or other facility with appropriate resources  Description: INTERVENTIONS:  - Identify barriers to discharge w/patient and caregiver  - Arrange for needed discharge resources and transportation as appropriate  - Identify discharge learning needs (meds, wound care, etc )  - Arrange for interpretive services to assist at discharge as needed  - Refer to Case Management Department for coordinating discharge planning if the patient needs post-hospital services based on physician/advanced practitioner order or complex needs related to functional status, cognitive ability, or social support system  Outcome: Progressing     Problem: Knowledge Deficit  Goal: Patient/family/caregiver demonstrates understanding of disease process, treatment plan, medications, and discharge instructions  Description: Complete learning assessment and assess knowledge base    Interventions:  - Provide teaching at level of understanding  - Provide teaching via preferred learning methods  Outcome: Progressing

## 2023-02-07 NOTE — PROGRESS NOTES
Vascular Surgery  H&P      51 yo male with ESRD who has a damaged TDC catheter which requires replacement. It was nearly out of the body so it was removed at bedside. Plan to reuse the original pathway.     Risks discussed     Ernie Wynn MD  [facility]    (Q) [phone]

## 2023-02-07 NOTE — PROGRESS NOTES
Problem: Falls - Risk of  Goal: *Absence of Falls  Description: Document Conception Brawn Fall Risk and appropriate interventions in the flowsheet.   Outcome: Progressing Towards Goal  Note: Fall Risk Interventions:

## 2023-02-07 NOTE — PROGRESS NOTES
TRANSFER - OUT REPORT:    Verbal report given to Lori RN (name) on Prema Bardales  being transferred to [facility](unit) for routine progression of care       Report consisted of patients Situation, Background, Assessment and   Recommendations(SBAR). Information from the following report(s) SBAR, Kardex, Procedure Summary, MAR, Recent Results and Cardiac Rhythm NSR  was reviewed with the receiving nurse. Lines:       Opportunity for questions and clarification was provided.       Patient transported with:   [facility]

## 2023-02-07 NOTE — PROCEDURES
Vascular Surgery   Operative Note    Date: 2/7/2023    Pre-Op Dx: End-stage renal disease on hemodialysis whose TDC was broken and inadvertently removed on the floor prior to arrival in the IR suite. 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 Previous TDC was in the left subclavian vein    Post-Op Dx: Same    Procedure:     1. US guided left axillary and subclavian venous access     2. Insertion of a 35 cm tunneled hemodialysis catheter (15.5)     3. Radiographic supervision and interpretation    Surgeon: Dominique Chery MD, FACS    Assistant: None    Anesthesia: Moderate sedation was administered under my supervision - IV fentanyl 50 mcg and versed 0 mg. Patient was constantly monitored with continuous cardiac monitoring and pulse oximetry. Independent trained observer (RN/RCIS) was present during the course of the procedure: . Indication: 59-year-old gentleman with end-stage renal disease currently on hemodialysis via TDC catheter in the left subclavian vein was found to have a broken catheter that was bleeding and inadvertently this had been removed almost entirely on the floor. Description of Procedure:     After informed consent was obtained, the patient was brought to the angio suite and positioned supine on the table. The left neck and chest were prepped and draped in sterile fashion. A safety pause was performed with all necessary personnel and equipment in the room. Conscious sedation was given. The left internal jugular vein was visualized and it was occluded under the clavicle. The left axillary and subclavian veins was evaluated with US and found to be patent. 1% lidocaine was infiltrated into the subcutaneous tissue. Using Seldinger technique and US guidance, the left axillary and subclavian was accessed with a needle. Venous blood was returned, and a wire was passed into the IVC. Wire placement was confirmed with fluoroscopy.  Next a 15.5 Fr 35 cm long permacath was measured and local anesthetic was instilled in the subcutaneous tissue throughout the planned catheter tract along the chest wall. Two small skin incisions were made; the first on the neck at the wire insertion site and the second on the chest at the catheter exit site. The catheter was tunneled from the the chest to the neck ensuring that the cuff rested under the skin. The subcutaneous tissues at the venous insertion site were sequentially dilated over the wire, and under fluoroscopic guidance, the peel-away sheath and dilator were inserted. Wire and dilator were removed. The catheter was then advanced through the sheath into the vein until it rested near the cavoatrial junction. Final fluoroscopic image was stored in the system confirming appropriate catheter placement without kinks and adequate lung inflation. Both ports aspirated briskly and were easily flushed with heparinized saline, packed with heparin and secured with caps. The neck and chest incisions were reapproximated with a subcuticular suture of Monocryl. The catheter was secured to the skin with nylon suture. The area was cleaned, dried and sterile occlusive dressing was applied. Patient tolerated the procedure well and was transported to recovery in stable condition.      Complications:   None     EBL: Minimal    Contrast: 0 ml      Alejandro Redmond MD, [facility]    (W) [phone]

## 2023-02-07 NOTE — DIALYSIS
Hemodialysis / [phone]    Vitals Pre Post Assessment Pre Post   BP BP: (!) 102/59 (02/07/23 1730)    LOC Alert and oriented x4 Alert and oriented x4   HR Pulse (Heart Rate): 76 (02/07/23 1730)  Lungs Diminished on room air Diminished on room air   Resp Resp Rate: 20 (02/07/23 1730)  Cardiac B.p wnl B/p wnl   Temp Temp: 97 °F (36.1 °C) (02/07/23 1708)  Skin intact intact   Weight    Edema none none   Tele status   Pain 0 0     Orders   Duration: Start: 1708 End: 1830 Total: 1hr 15 mins     Dialyzer: Dialyzer/Set Up Inspection: [place] Officer (02/07/23 1708)   K Bath: Dialysate K (mEq/L): 2 (02/07/23 1708)   Ca Bath: Dialysate CA (mEq/L): 2.5 (02/07/23 1708)   Na: Dialysate NA (mEq/L): 138 (02/07/23 1708)   Bicarb: Dialysate HCO3 (mEq/L): 30 (02/07/23 1708)   Target Fluid Removal: Goal/Amount of Fluid to Remove (mL): 3000 mL (02/07/23 1708)     Access   Type & Location: RT CVC   Comments:                                   PERMA-CATh placed today, blood on dressing but will change tomorrow to present excess bleeding. Ports cleaned, blood aspirated and lines flushed without any issues.  Good blood flow noted     Labs   HBsAg (Antigen) / date:   6/1/22 NEG                                            HBsAb (Antibody) / date: 6/1/22 Imm   Source: Connect [facility]   Obtained/Reviewed  Critical Results Called HGB   Date Value Ref Range Status   02/07/2023 8.6 (L) 13.0 - 16.0 g/dL Final     Potassium   Date Value Ref Range Status   02/07/2023 3.8 3.5 - 5.5 mmol/L Final     Calcium   Date Value Ref Range Status   02/07/2023 8.3 (L) 8.5 - 10.1 MG/DL Final     BUN   Date Value Ref Range Status   02/07/2023 55 (H) 7.0 - 18 MG/DL Final     Creatinine   Date Value Ref Range Status   02/07/2023 10.30 (H) 0.6 - 1.3 MG/DL Final        Meds Given   Name Dose Route   none                 Adequacy / Fluid    Total Liters Process: 20.7   Net Fluid Removed: 0      Comments   Time Out Done:   (Time) 1650   Admitting Diagnosis: ESRD, CVC malfunction   Consent obtained/signed: Informed Consent Verified: Yes (02/07/23 1708)   Machine / RO # Machine Number: [id number] (02/07/23 1708)   Primary Nurse Rpt Pre: Pamela Zuluaga RN   Primary Nurse Rpt Post: Pamela Zuluaga RN   Pt Education: ESRD   Care Plan: ESRD   Pts outpatient clinic:      Tx Summary   Comments:     1708 Dialysis started  [id number] High arterial pressures, Lines reversed, pt repositioned, lines reversed back and then BFR lowered to do dialysis without arterial pressure alarming      1730 DR Yolis Burt notified about pt request to run 3hrs instead of 3.5hrs, and issues with CVC with BFR at 300. No new orders at this time      [id number]            High arterial pressures again. Pt repositioned in bed, lines reversed and reversed back, both ports power flushed with saline and BFR decreased to 200BFR but still having high arterial pressures that jump from -100 to -320. Dr Yolis Burt called and informed. Treatment stopped and blod rinsed back. Dr Yolis Burt states he will let Vascular know.  Pt informed about CVC issues and treatment ending early, pt verbalized understanding

## 2023-02-07 NOTE — PROGRESS NOTES
Patient admitted from home. Patient lives alone and attends dialysis M W F at 1130 am. Patient here for [facility] [place] insertion. Plan is to DC home after if stable. Patient stated that his car is still at dialysis center and is going to call his friend to get a ride to facility, upon DC. Patient has rollator at bedside as well. No home needs identified at this time. Care Management Interventions  PCP Verified by CM: Yes  Mode of Transport at Discharge:  Other (see comment)  Support Systems: Other (Comment)  Confirm Follow Up Transport: Family  The Plan for Transition of Care is Related to the Following Treatment Goals : home with PCP follow up  Freedom of Choice List was Provided with Basic Dialogue that Supports the Patient's Individualized Plan of Care/Goals, Treatment Preferences and Shares the Quality Data Associated with the Providers?: Yes  Discharge Location  Patient Expects to be Discharged to[de-identified] Home

## 2023-02-07 NOTE — PROGRESS NOTES
Hospitalist Progress Note-critical care note     Patient: Stalin Swann MRN: [id number]  CSN: [id number]    YOB: 1970  Age: 50 y.o.   Sex: male    DOA: 2/6/2023 LOS:  LOS: 1 day            Chief complaint: fitting catheter, esdr on HD , hypoglycemia,      Assessment/Plan         Hospital Problems  Date Reviewed: 6/24/2022            Codes Class Noted POA    * (Principal) Admission for fitting and adjustment of dialysis catheter [facility]) ICD-10-CM: Z49.01  ICD-9-CM: V56.1  2/6/2023 Unknown        ESRD (end stage renal disease) ([facility] ICD-10-CM: N18.6  ICD-9-CM: 585.6  2/6/2023 Unknown        History of hypoglycemia ICD-10-CM: Z86.39  ICD-9-CM: V12.29  Unknown Unknown        Hypertensive emergency ICD-10-CM: I16.1  ICD-9-CM: 401.9  8/26/2022 Yes        Hypertension ICD-10-CM: I10  ICD-9-CM: 401.9  6/1/2022 Yes        ESRD (end stage renal disease) on dialysis [facility]) ICD-10-CM: N18.6, Z99.2  ICD-9-CM: 585.6, V45.11  1/29/2020 Yes        Hx of BKA, right ([facility] ICD-10-CM: Z89.511  ICD-9-CM: V49.75  1/29/2020 Yes              hypertensive emergency-resolved   CT head no acute issue-no bleeding  Will have mri brain, Age-indeterminate infarct in the right inferior cerebellum, which may be  acute to subacute infarct   He is alert and oriented no change neurologically         Bleeding from HD catheter  Stopped by clamping the catheter   Will change tdc today per vascular team   Continue h/h monitoring       End-stage renal disease on HD  Nephrology has been consulted, need vascular surgeon to fix HD catheter  Denies any shortness of breath, potassium is okay  We will have HD tomorrow      Anemia -due to esrd , a little bit bleeding overnight, h/h stable from yesterday           History of Hypoglycemia-glucose low am   Put hypoglycemia protocol, will give d5 now npo -low rate       Right BKA, fall precaution    Subjective I am [place] , I was told to have procedure AM     Disposition :1-2 days   Review of systems:    General: No fevers or chills. Cardiovascular: No chest pain or pressure. No palpitations. Pulmonary: No shortness of breath. Gastrointestinal: No nausea, vomiting. Vital signs/Intake and Output:  Visit Vitals  BP (!) 160/71 (BP 1 Location: Left upper arm, BP Patient Position: At rest)   Pulse 88   Temp 97.3 °F (36.3 °C)   Resp 18   Ht 5' 7\" (1.702 m)   Wt 75 kg (165 lb 5.5 oz)   SpO2 97%   BMI 25.90 kg/m²     Current Shift:  No intake/output data recorded. Last three shifts:  No intake/output data recorded. Physical Exam:  General: WD, WN. Alert, cooperative, no acute distress    HEENT: NC, Atraumatic. PERRLA, anicteric sclerae. Lungs: CTA Bilaterally. No Wheezing/Rhonchi/Rales. Tdc noted on left chest   Heart:  Regular  rhythm,  No murmur, No Rubs, No Gallops  Abdomen: Soft, Non distended, Non tender. +Bowel sounds,   Extremities: No c/c/e  Psych:   Not anxious or agitated. Neurologic:  No acute neurological deficit. Labs: Results:       Chemistry Recent Labs     02/07/23  0459 02/06/23  1246   GLU 67* 113*    144   K 3.8 3.9    108   CO2 24 29   BUN 55* 51*   CREA 10.30* 9.41*   CA 8.3* 8.6   AGAP 11 7   BUCR 5* 5*   AP  --  131*   TP  --  6.9   ALB  --  2.3*   GLOB  --  4.6*   AGRAT  --  0.5*      CBC w/Diff Recent Labs     02/07/23  0459 02/06/23  1246   WBC 7.6 8.9   RBC 3.36* 3.36*   HGB 8.6* 8.5*   HCT 27.1* 27.1*    167   GRANS 68 74*   LYMPH 10* 8*   EOS 9* 8*      Cardiac Enzymes No results for input(s): CPK, CKND1, WILLIE in the last 72 hours.     No lab exists for component: CKRMB, TROIP   Coagulation Recent Labs     02/06/23  1246   PTP 15.1   INR 1.2   APTT 46.7*       Lipid Panel Lab Results   Component Value Date/Time    Cholesterol, total 115 06/02/2022 09:45 AM    HDL Cholesterol 62 (H) 06/02/2022 09:45 AM    LDL, calculated 46 06/02/2022 09:45 AM    VLDL, calculated 7 06/02/2022 09:45 AM    Triglyceride 35 06/02/2022 09:45 AM    CHOL/HDL Ratio 1.9 06/02/2022 09:45 AM      BNP No results for input(s): BNPP in the last 72 hours. Liver Enzymes Recent Labs     02/06/23  1246   TP 6.9   ALB 2.3*   *      Thyroid Studies Lab Results   Component Value Date/Time    TSH 2.38 08/29/2021 01:20 AM        Procedures/imaging: see electronic medical records for all procedures/Xrays and details which were not copied into this note but were reviewed prior to creation of Plan    CT HEAD WO CONT    Result Date: 2/6/2023  EXAM:  CT HEAD WO CONT INDICATION:   HYPERTENSIVE EMERGENCY COMPARISON: CT head 6/1/2022. TECHNIQUE: Unenhanced CT of the head was performed using 5 mm images. Brain and bone windows were generated. CT dose reduction was achieved through use of a standardized protocol tailored for this examination and automatic exposure control for dose modulation. FINDINGS: There is an age-indeterminate infarct in the right inferior cerebellum. There are additional small chronic infarcts noted in the right arden and right cerebellum. The ventricles are normal in size and position. Basilar cisterns are patent. No midline shift. There is no evidence of acute hemorrhage. The paranasal sinuses, mastoid air cells, and middle ears are clear. The orbital contents are within normal limits. There are no significant osseous or extracranial soft tissue lesions. 1. Age-indeterminate infarct in the right inferior cerebellum, which may be acute to subacute. Recommend MRI for further evaluation. 2. Additional small chronic infarcts in the right arden and right cerebellum. 3. No evidence of acute hemorrhage. XR CHEST PORT    Result Date: 2/6/2023  INDICATION:  missed dialysis EXAM: Single portable view of chest 1323 . Comparison:8/28/2022 Findings: Cardiac silhouette is enlarged. Pulmonary vasculature is not engorged. There are no focal parenchymal opacities, effusions, or pneumothorax. There is a tunneled left subclavian central line.  Vascular stent overlies the proximal left forearm.      1. Enlarged cardiac silhouette, no evidence of pulmonary edema      Beth Pace MD

## 2023-02-07 NOTE — CONSULTS
RENAL CONSULT  2023    Patient:  Afshan Valencia  :  1970  Gender:  male  MRN #:  [id number]    Reason for Consult: ESRD related care and high blood pressure     History of Present Illness:    Afshan Valencia is a 50y.o. year old male ESRD on HD , non compliant with medications and  dialysis /skip multiple dialysis session every month despite knowing the risk,  was sent from dialysis unit because of bleeding around catheter site     No history of trauma, pain, fever and chills . Found to have crack in [facility] upon evaluation by vascular surgeon   So advise admission . When I saw him this morning he denied any more bleeding from catheter site, which was clamped. No chest pain and shortness of breath  Denied other symptoms         Past Medical History:   Diagnosis Date    Chronic kidney disease     Diabetes ([facility]     Heart failure ([facility]     Hemodialysis patient ([facility]     Hypertension     Sickle cell trait ([facility]      Past Surgical History:   Procedure Laterality Date    HX ORTHOPAEDIC      right BKA     IR DECLOT AV GRAFT PERCUTANEOUS  6/3/2022    IR INSERT NON TUNL CVC OVER 5 YRS  2022    IR INSERT TUNL CVC W/O PORT OVER 5 YR  2022     No family history on file.   Allergies   Allergen Reactions    Penicillins Rash    Vancomycin Other (comments)     kayla syndrome     Current Facility-Administered Medications   Medication Dose Route Frequency Provider Last Rate Last Admin    isosorbide mononitrate ER (IMDUR) tablet 60 mg  60 mg Oral DAILY Julian Huerta MD        spironolactone (ALDACTONE) tablet 100 mg  100 mg Oral DAILY Julian Huerta MD        sevelamer carbonate (RENVELA) tab 800 mg  800 mg Oral TID WITH MEALS Julian Huerta MD   800 mg at 23 2312    pantoprazole (PROTONIX) tablet 40 mg  40 mg Oral ACB Julian Huerta MD   40 mg at 23 8911    hydrALAZINE (APRESOLINE) 20 mg/mL injection 10 mg  10 mg IntraVENous Q6H PRN Julian Huerta MD   10 mg at 23 2125    amLODIPine (NORVASC) tablet 10 mg  10 mg Oral DAILY Elena Bateman MD   10 mg at 02/06/23 2312    doxazosin (CARDURA) tablet 2 mg  2 mg Oral DAILY Elena Bateman MD   2 mg at 02/06/23 2312    sodium chloride (NS) flush 5-40 mL  5-40 mL IntraVENous Q8H Elena Bateman MD   10 mL at 02/06/23 2130    sodium chloride (NS) flush 5-40 mL  5-40 mL IntraVENous PRN Elena Bateman MD        acetaminophen (TYLENOL) tablet 650 mg  650 mg Oral Q6H PRN Elena Bateman MD        Or    acetaminophen (TYLENOL) suppository 650 mg  650 mg Rectal Q6H PRN Elena Bateman MD        bisacodyL (DULCOLAX) suppository 10 mg  10 mg Rectal DAILY PRN Elena Bateman MD        promethazine (PHENERGAN) tablet 12.5 mg  12.5 mg Oral Q6H PRN Elena Bateman MD        Or    ondansetron [facility]) injection 4 mg  4 mg IntraVENous Q6H PRN Elena Bateman MD        losartan (COZAAR) tablet 100 mg  100 mg Oral DAILY José Alston MD   100 mg at 02/06/23 2125    glucose chewable tablet 16 g  4 Tablet Oral PRN Kourtney Lowry MD        glucagon (GLUCAGEN) injection 1 mg  1 mg IntraMUSCular PRN Kourtney Lowry MD        dextrose 10% infusion 0-250 mL  0-250 mL IntraVENous PRN Kourtney Lowry MD             Review of Symptoms:     Consitutional Symptoms: No fever, weight loss, weight gain and fatigue  Eyes:- No change in vision , no itching   Ears, Nose, Mouth, Throat:  No neck pain , swelling ,hearing loss and nose bleed. Pulmonary: No cough and shortness of breath . CVS: No  Chest pain , palpitation and orthopnea, bleeding from [facility] site/no fever  GI: No nausea, vomiting, abdominal pain, and blood in stool   - Makes little urine   Neurological:No dizzy ness, syncope, focal weakness and numbness .   Skin : No rash and erythema   Endocrine: No feeling of excessive cold or warmth, hot flushes  Psychiatric: Denied feeling depressed    Objective:    Visit Vitals  BP (!) 160/71 (BP 1 Location: Left upper arm, BP Patient Position: At rest)   Pulse 88   Temp 97.3 °F (36.3 °C)   Resp 18   Ht 5' 7\" (1.702 m)   Wt 75 kg (165 lb 5.5 oz)   SpO2 97% BMI 25.90 kg/m²       Physical Exam:    Pt awake,  alert and comfortable  HEENT: No JVD, anicteric sclera, no neck swelling  Lung: clear to auscultation, no crackles and wheeze  Heart: s1s2 regualr no rubs or murmur  Abdomen: soft, non tender, no guarding, normal bowel sounds. Ext: no edema in LLE, RLE- BKA   CNS- Oriented to time , place and person     Laboratory Data:    Lab Results   Component Value Date    BUN 55 (H) 02/07/2023    BUN 51 (H) 02/06/2023    BUN 33 (H) 08/30/2022     02/07/2023     02/06/2023     08/30/2022    CO2 24 02/07/2023    CO2 29 02/06/2023    CO2 24 08/30/2022     Lab Results   Component Value Date    WBC 7.6 02/07/2023    HGB 8.6 (L) 02/07/2023    HCT 27.1 (L) 02/07/2023     No components found for: CALCIUM, PHOSPHORUS, MAGNESIUM  Lab Results   Component Value Date    HDL 62 (H) 06/02/2022     No results found for: SPECIMENTYP, TURBIDITY, UGLU    Imaging Reveiwed:    CXR:-    IMPRESSION  1.  Enlarged cardiac silhouette, no evidence of pulmonary edema    Assessment:Danny Swann is a 50y.o. year old maleh/o ESRD on HD , non compliant with dialysis  admitted for malfunctioning dialysis catheter   Does not have volume overload and hyperkalemia  Has uncontrolled hypertension        Hypertension   ESRD  Anemia of CKD   Hyperphosphatemia   Secondary Hyperparathyroidism      Plan:       Hypertension- BP above target range   Continue amlodipine 10 mg  and losartan 100 mg , increase doxazosin 2 mg BID, start coreg 25 mg BID   Aldactone 25 mg      ESRD-   Patient examined and labs reviewed,   Waiting for vascular to place [facility] '  Plan for dialysis after [facility] placement today   Next HD tomorrow      Anemia- EVIE as indicated      Bone mineral disease  Hyperphosphatemia- start sevelamer  800 mg TID    Secondary hyperparathyroidism- hectoral 4 mcg iv today              Larisa Major MD, MD  [facility].- Nephrology

## 2023-02-08 ENCOUNTER — APPOINTMENT (OUTPATIENT)
Dept: INTERVENTIONAL RADIOLOGY/VASCULAR | Age: 53
End: 2023-02-08
Attending: SURGERY
Payer: MEDICARE

## 2023-02-08 LAB
ANION GAP SERPL CALC-SCNC: 12 MMOL/L (ref 3–18)
BASOPHILS # BLD: 0 K/UL (ref 0–0.1)
BASOPHILS NFR BLD: 1 % (ref 0–2)
BUN SERPL-MCNC: 47 MG/DL (ref 7–18)
BUN/CREAT SERPL: 5 (ref 12–20)
CALCIUM SERPL-MCNC: 8.6 MG/DL (ref 8.5–10.1)
CHLORIDE SERPL-SCNC: 106 MMOL/L (ref 100–111)
CO2 SERPL-SCNC: 22 MMOL/L (ref 21–32)
CREAT SERPL-MCNC: 9.12 MG/DL (ref 0.6–1.3)
DIFFERENTIAL METHOD BLD: ABNORMAL
EOSINOPHIL # BLD: 0.6 K/UL (ref 0–0.4)
EOSINOPHIL NFR BLD: 10 % (ref 0–5)
ERYTHROCYTE [DISTWIDTH] IN BLOOD BY AUTOMATED COUNT: 23.9 % (ref 11.6–14.5)
GLUCOSE BLD STRIP.AUTO-MCNC: 132 MG/DL (ref 70–110)
GLUCOSE BLD STRIP.AUTO-MCNC: 69 MG/DL (ref 70–110)
GLUCOSE BLD STRIP.AUTO-MCNC: 77 MG/DL (ref 70–110)
GLUCOSE BLD STRIP.AUTO-MCNC: 91 MG/DL (ref 70–110)
GLUCOSE SERPL-MCNC: 79 MG/DL (ref 74–99)
HCT VFR BLD AUTO: 25.5 % (ref 36–48)
HGB BLD-MCNC: 8.3 G/DL (ref 13–16)
IMM GRANULOCYTES # BLD AUTO: 0 K/UL (ref 0–0.04)
IMM GRANULOCYTES NFR BLD AUTO: 1 % (ref 0–0.5)
LYMPHOCYTES # BLD: 0.7 K/UL (ref 0.9–3.6)
LYMPHOCYTES NFR BLD: 11 % (ref 21–52)
MAGNESIUM SERPL-MCNC: 2.6 MG/DL (ref 1.6–2.6)
MCH RBC QN AUTO: 25.5 PG (ref 24–34)
MCHC RBC AUTO-ENTMCNC: 32.5 G/DL (ref 31–37)
MCV RBC AUTO: 78.5 FL (ref 78–100)
MONOCYTES # BLD: 0.7 K/UL (ref 0.05–1.2)
MONOCYTES NFR BLD: 11 % (ref 3–10)
NEUTS SEG # BLD: 4 K/UL (ref 1.8–8)
NEUTS SEG NFR BLD: 67 % (ref 40–73)
NRBC # BLD: 0 K/UL (ref 0–0.01)
NRBC BLD-RTO: 0 PER 100 WBC
PLATELET # BLD AUTO: 165 K/UL (ref 135–420)
POTASSIUM SERPL-SCNC: 3.8 MMOL/L (ref 3.5–5.5)
RBC # BLD AUTO: 3.25 M/UL (ref 4.35–5.65)
SODIUM SERPL-SCNC: 140 MMOL/L (ref 136–145)
WBC # BLD AUTO: 6 K/UL (ref 4.6–13.2)

## 2023-02-08 PROCEDURE — 74011000250 HC RX REV CODE- 250: Performed by: HOSPITALIST

## 2023-02-08 PROCEDURE — 82962 GLUCOSE BLOOD TEST: CPT

## 2023-02-08 PROCEDURE — 90935 HEMODIALYSIS ONE EVALUATION: CPT

## 2023-02-08 PROCEDURE — 36415 COLL VENOUS BLD VENIPUNCTURE: CPT

## 2023-02-08 PROCEDURE — 80048 BASIC METABOLIC PNL TOTAL CA: CPT

## 2023-02-08 PROCEDURE — 5A1D70Z PERFORMANCE OF URINARY FILTRATION, INTERMITTENT, LESS THAN 6 HOURS PER DAY: ICD-10-PCS | Performed by: SURGERY

## 2023-02-08 PROCEDURE — 77001 FLUOROGUIDE FOR VEIN DEVICE: CPT

## 2023-02-08 PROCEDURE — 36556 INSERT NON-TUNNEL CV CATH: CPT

## 2023-02-08 PROCEDURE — 85025 COMPLETE CBC W/AUTO DIFF WBC: CPT

## 2023-02-08 PROCEDURE — 74011000250 HC RX REV CODE- 250: Performed by: SURGERY

## 2023-02-08 PROCEDURE — 83735 ASSAY OF MAGNESIUM: CPT

## 2023-02-08 PROCEDURE — C1752 CATH,HEMODIALYSIS,SHORT-TERM: HCPCS

## 2023-02-08 PROCEDURE — 74011250636 HC RX REV CODE- 250/636: Performed by: SURGERY

## 2023-02-08 PROCEDURE — 9990 CHARGE CONVERSION

## 2023-02-08 PROCEDURE — 65270000046 HC RM TELEMETRY

## 2023-02-08 PROCEDURE — 76937 US GUIDE VASCULAR ACCESS: CPT

## 2023-02-08 PROCEDURE — 74011000250 HC RX REV CODE- 250: Performed by: FAMILY MEDICINE

## 2023-02-08 RX ORDER — LIDOCAINE HYDROCHLORIDE 10 MG/ML
1-20 INJECTION INFILTRATION; PERINEURAL
Status: COMPLETED | OUTPATIENT
Start: 2023-02-08 | End: 2023-02-08

## 2023-02-08 RX ORDER — HEPARIN SODIUM 200 [USP'U]/100ML
500 INJECTION, SOLUTION INTRAVENOUS
Status: COMPLETED | OUTPATIENT
Start: 2023-02-08 | End: 2023-02-08

## 2023-02-08 RX ORDER — HEPARIN SODIUM 1000 [USP'U]/ML
10000 INJECTION, SOLUTION INTRAVENOUS; SUBCUTANEOUS
Status: COMPLETED | OUTPATIENT
Start: 2023-02-08 | End: 2023-02-08

## 2023-02-08 RX ADMIN — SODIUM CHLORIDE, PRESERVATIVE FREE 10 ML: 5 INJECTION INTRAVENOUS at 22:00

## 2023-02-08 RX ADMIN — DEXTROSE MONOHYDRATE 250 ML: 100 INJECTION, SOLUTION INTRAVENOUS at 06:05

## 2023-02-08 RX ADMIN — SODIUM CHLORIDE, PRESERVATIVE FREE 10 ML: 5 INJECTION INTRAVENOUS at 17:38

## 2023-02-08 RX ADMIN — LIDOCAINE HYDROCHLORIDE 10 ML: 10 INJECTION, SOLUTION INFILTRATION; PERINEURAL at 10:22

## 2023-02-08 RX ADMIN — SODIUM CHLORIDE, PRESERVATIVE FREE 10 ML: 5 INJECTION INTRAVENOUS at 17:37

## 2023-02-08 RX ADMIN — HEPARIN SODIUM 10000 UNITS: 1000 INJECTION INTRAVENOUS; SUBCUTANEOUS at 10:22

## 2023-02-08 RX ADMIN — SODIUM CHLORIDE, PRESERVATIVE FREE 10 ML: 5 INJECTION INTRAVENOUS at 05:26

## 2023-02-08 RX ADMIN — HEPARIN SODIUM IN SODIUM CHLORIDE 1000 UNITS: 200 INJECTION INTRAVENOUS at 10:21

## 2023-02-08 NOTE — PROGRESS NOTES
Problem: Falls - Risk of  Goal: *Absence of Falls  Description: Document Hank Enriquez Fall Risk and appropriate interventions in the flowsheet. Outcome: Progressing Towards Goal  Note: Fall Risk Interventions:  Mobility Interventions: Bed/chair exit alarm         Medication Interventions: Patient to call before getting OOB    Elimination Interventions: Call light in reach              Problem: Risk for Spread of Infection  Goal: Prevent transmission of infectious organism to others  Description: Prevent the transmission of infectious organisms to other patients, staff members, and visitors.   Outcome: Progressing Towards Goal     Problem: General Medical Care Plan  Goal: *Vital signs within specified parameters  Outcome: Progressing Towards Goal  Goal: *Labs within defined limits  Outcome: Progressing Towards Goal  Goal: *Absence of infection signs and symptoms  Outcome: Progressing Towards Goal  Goal: *Optimal pain control at patient's stated goal  Outcome: Progressing Towards Goal  Goal: *Skin integrity maintained  Outcome: Progressing Towards Goal  Goal: *Fluid volume balance  Outcome: Progressing Towards Goal  Goal: *Optimize nutritional status  Outcome: Progressing Towards Goal  Goal: *Anxiety reduced or absent  Outcome: Progressing Towards Goal  Goal: *Progressive mobility and function (eg: ADL's)  Outcome: Progressing Towards Goal

## 2023-02-08 NOTE — PROGRESS NOTES
Temporary hemodialysis catheter Placement Using Ultrasound Guidance    Date :  2/8/2023  Pre / Post Op Dx : esrd  Procedure : placement of left common femoral vein, 24cm triple lumen, temporary hemodialysis catheter with ultrasound guidance. Surgeon : Clarence Reese  Procedure Performed by : Linnette Amador PA-C  Anesthesia : 1% Lidocaine   EBL : Minimal   Complications : None   Description :   After explaining the risks and benefits of the procedure to the patient informed consent was obtained. The patient was properly identified and a time out was performed. The patient was then positioned supine. The area of the left groin was prepped and draped in sterile fashion. This area was then anesthetized with approximately 5 ml of 1% lidocaine. A [employer] ultrasound was used to identify the left common femoral vein. Under direct ultrasound guidance, the left common femoral vein was accessed on the first stick via the Seldinger technique. A soft tipped J wire was inserted through the needle and advanced into the vein without difficulty . The needle was then removed. Using a #11 blade a small \"nick\" was made at the skin / wire interface. A tissue dilator was advanced over the guide wire through the subcutaneous tissue and then removed. The temporary hemodialysis catheter was then advanced into position over the guide wire and the wire was then removed. Placement confirmed with under direct fluoroscopy. All three ports aspirated and flushed well. The catheter was then sutured in place and was dressed with a Biopatch and Tegaderm dressing. The patient tolerated the procedure well. There were no immediate complications noted.

## 2023-02-08 NOTE — PROGRESS NOTES
Ok to access right left common femoral vein temporary hemodialysis catheter for hemodialysis, IV access, blood draws.

## 2023-02-08 NOTE — PROGRESS NOTES
Problem: Falls - Risk of  Goal: *Absence of Falls  Description: Document Reyes Pancoast Fall Risk and appropriate interventions in the flowsheet. Outcome: Progressing Towards Goal  Note: Fall Risk Interventions:  Mobility Interventions: Bed/chair exit alarm         Medication Interventions: Patient to call before getting OOB    Elimination Interventions: Call light in reach              Problem: Risk for Spread of Infection  Goal: Prevent transmission of infectious organism to others  Description: Prevent the transmission of infectious organisms to other patients, staff members, and visitors.   Outcome: Progressing Towards Goal

## 2023-02-08 NOTE — PROGRESS NOTES
Hospitalist Progress Note    Patient: Kenzie Baugh MRN: [id number]  CSN: [id number]    YOB: 1970  Age: 50 y.o. Sex: male    DOA: 2/6/2023 LOS:  LOS: 2 days          Chief Complaint:    hypertension      Assessment/Plan        hypertensive emergency-resolved   MRI brain nil acute  Due to BP dropping with dialysis, BP meds being held to trial dialysis with new TDC today     TDC replaced this am    Hypoglycemia-allowed to eat now so should be improved    End-stage renal disease on HD  Dialysis today     Anemia of ESRD     Follow BG levels  Monitor BP  Resume home meds when tolerating dialysis     Right BKA, uses a prosthetic limb    D/c planning     Disposition :  Patient Active Problem List   Diagnosis Code    Iron deficiency anemia D50.9    ESRD (end stage renal disease) on dialysis (HCC) N18.6, Z99.2    HTN (hypertension) I10    Hx of BKA, right (HCC) Z89.511    Non-compliance with renal dialysis ([address] Z91.15    Hypertension I10    Acute pulmonary edema ([address] J81.0    Arteriovenous graft infection ([address] T82. 7XXA    Hypoxia R09.02    Hypertensive emergency I16.1    Cardiomegaly I51.7    Pulmonary infiltrates R91.8    History of COVID-19 Z86.16    Chronic anemia D64.9    Chronic diarrhea K52.9    Admission for fitting and adjustment of dialysis catheter ([address] Z49.01    ESRD (end stage renal disease) ([address] N18.6    History of hypoglycemia Z86.39       Subjective:    Denies any new issue  Annoyed he is waiting this am on procedure  BG was in 70's-received dextrose  No HA, nausea, CP, SOB    Review of systems:    Constitutional: denies fevers, chill  Respiratory: denies SOB, cough  Cardiovascular: denies chest pain, palpitations  Gastrointestinal: denies nausea, vomiting      Vital signs/Intake and Output:  Visit Vitals  BP (!) 168/76   Pulse 93   Temp 98.9 °F (37.2 °C)   Resp 18   Ht 5' 7\" (1.702 m)   Wt 75 kg (165 lb 5.5 oz)   SpO2 100%   BMI 25.90 kg/m²     Current Shift:  No intake/output data recorded. Last three shifts:  No intake/output data recorded. Exam:    General: Well developed, alert, NAD, OX3  CVS:Regular rate and rhythm, no M/R/G, S1/S2 heard, no thrill  Lungs:Clear to auscultation bilaterally, no wheezes, rhonchi, or rales  Abdomen: Soft, Nontender, No distention  Extremities: right BKA  Neuro:grossly normal , follows commands  Psych:appropriate                Labs: Results:       Chemistry Recent Labs     02/08/23  0451 02/07/23  0459 02/06/23  1246   GLU 79 67* 113*    140 144   K 3.8 3.8 3.9    105 108   CO2 22 24 29   BUN 47* 55* 51*   CREA 9.12* 10.30* 9.41*   CA 8.6 8.3* 8.6   AGAP 12 11 7   BUCR 5* 5* 5*   AP  --   --  131*   TP  --   --  6.9   ALB  --   --  2.3*   GLOB  --   --  4.6*   AGRAT  --   --  0.5*      CBC w/Diff Recent Labs     02/08/23  0451 02/07/23  0459 02/06/23  1246   WBC 6.0 7.6 8.9   RBC 3.25* 3.36* 3.36*   HGB 8.3* 8.6* 8.5*   HCT 25.5* 27.1* 27.1*    166 167   GRANS 67 68 74*   LYMPH 11* 10* 8*   EOS 10* 9* 8*      Cardiac Enzymes No results for input(s): CPK, CKND1, WILLIE in the last 72 hours. No lab exists for component: CKRMB, TROIP   Coagulation Recent Labs     02/06/23  1246   PTP 15.1   INR 1.2   APTT 46.7*       Lipid Panel Lab Results   Component Value Date/Time    Cholesterol, total 115 06/02/2022 09:45 AM    HDL Cholesterol 62 (H) 06/02/2022 09:45 AM    LDL, calculated 46 06/02/2022 09:45 AM    VLDL, calculated 7 06/02/2022 09:45 AM    Triglyceride 35 06/02/2022 09:45 AM    CHOL/HDL Ratio 1.9 06/02/2022 09:45 AM      BNP No results for input(s): BNPP in the last 72 hours.    Liver Enzymes Recent Labs     02/06/23  1246   TP 6.9   ALB 2.3*   *      Thyroid Studies Lab Results   Component Value Date/Time    TSH 2.38 08/29/2021 01:20 AM        Procedures/imaging: see electronic medical records for all procedures/Xrays and details which were not copied into this note but were reviewed prior to creation of Curry Betts MD

## 2023-02-08 NOTE — PROGRESS NOTES
RENAL CONSULT  2023    Patient:  Michelle Barnett  :  1970  Gender:  male  MRN #:  [id number]    Reason for Consult: ESRD related care and high blood pressure     Subjective:   Says he feels fine   BP above target range   No Sob and chest pain     Objective:    Visit Vitals  BP (!) 168/76   Pulse 93   Temp 98.9 °F (37.2 °C)   Resp 18   Ht 5' 7\" (1.702 m)   Wt 75 kg (165 lb 5.5 oz)   SpO2 100%   BMI 25.90 kg/m²       Physical Exam:    Pt awake,  alert and comfortable  Lung: clear to auscultation  Ext: no edema in LLE, RLE- BKA     Laboratory Data:    Lab Results   Component Value Date    BUN 47 (H) 2023    BUN 55 (H) 2023    BUN 51 (H) 2023     2023     2023     2023    CO2 22 2023    CO2 24 2023    CO2 29 2023     Lab Results   Component Value Date    WBC 6.0 2023    HGB 8.3 (L) 2023    HCT 25.5 (L) 2023     No components found for: CALCIUM, PHOSPHORUS, MAGNESIUM  Lab Results   Component Value Date    HDL 62 (H) 2022     No results found for: SPECIMENTYP, TURBIDITY, UGLU    Imaging Reveiwed:    CXR:-    IMPRESSION  1.  Enlarged cardiac silhouette, no evidence of pulmonary edema    Assessment:Danny Lopez Shown is a 50y.o. year old maleh/o ESRD on HD , non compliant with dialysis  admitted for malfunctioning dialysis catheter   Does not have volume overload and hyperkalemia  Has uncontrolled hypertension        Hypertension   ESRD  Anemia of CKD   Hyperphosphatemia   Secondary Hyperparathyroidism      Plan:       Hypertension- BP was low while on HD machine yesterday ,  BP above target range now  Will hold off all antihypertensive for now prior to HD to allow UF   Resume amlodipine and losartan post HD if BP.140/90 mmHg  And then other depending on BP , he is non complaint to medicine at home, does not adhere   He generally does not need all antihypertensive in house ,  which was prescribed as an outpatient ESRD-   Patient examined and labs reviewed,   Unable to do HD yesterday due to [facility] malfunction, poor blood flow and high arterial pressure   He has temporary growing line today for dialysis , most likely [facility] replaced tomorrow   Plan for dialysis today      Anemia- EVIE as indicated after 14 days of mircera dose as outpatient      Bone mineral disease  Hyperphosphatemia- sevelamer  800 mg TID    Secondary hyperparathyroidism- hectoral 4 mcg iv on Friday    Néstor Dangelo MD, MD  [facility].- Nephrology

## 2023-02-09 ENCOUNTER — APPOINTMENT (OUTPATIENT)
Dept: INTERVENTIONAL RADIOLOGY/VASCULAR | Age: 53
End: 2023-02-09
Attending: SURGERY
Payer: MEDICARE

## 2023-02-09 LAB
ANION GAP SERPL CALC-SCNC: 9 MMOL/L (ref 3–18)
ATRIAL RATE: 94 BPM
BASOPHILS # BLD: 0 K/UL (ref 0–0.1)
BASOPHILS NFR BLD: 1 % (ref 0–2)
BUN SERPL-MCNC: 28 MG/DL (ref 7–18)
BUN/CREAT SERPL: 4 (ref 12–20)
CALCIUM SERPL-MCNC: 8.7 MG/DL (ref 8.5–10.1)
CALCULATED P AXIS, ECG09: 54 DEGREES
CALCULATED R AXIS, ECG10: -2 DEGREES
CALCULATED T AXIS, ECG11: 83 DEGREES
CHLORIDE SERPL-SCNC: 105 MMOL/L (ref 100–111)
CO2 SERPL-SCNC: 26 MMOL/L (ref 21–32)
CREAT SERPL-MCNC: 6.41 MG/DL (ref 0.6–1.3)
DIAGNOSIS, 93000: NORMAL
DIFFERENTIAL METHOD BLD: ABNORMAL
EOSINOPHIL # BLD: 0.5 K/UL (ref 0–0.4)
EOSINOPHIL NFR BLD: 8 % (ref 0–5)
ERYTHROCYTE [DISTWIDTH] IN BLOOD BY AUTOMATED COUNT: 23.8 % (ref 11.6–14.5)
GLUCOSE BLD STRIP.AUTO-MCNC: 73 MG/DL (ref 70–110)
GLUCOSE BLD STRIP.AUTO-MCNC: 82 MG/DL (ref 70–110)
GLUCOSE BLD STRIP.AUTO-MCNC: 83 MG/DL (ref 70–110)
GLUCOSE SERPL-MCNC: 88 MG/DL (ref 74–99)
HCT VFR BLD AUTO: 28 % (ref 36–48)
HGB BLD-MCNC: 9.1 G/DL (ref 13–16)
IMM GRANULOCYTES # BLD AUTO: 0 K/UL (ref 0–0.04)
IMM GRANULOCYTES NFR BLD AUTO: 0 % (ref 0–0.5)
LYMPHOCYTES # BLD: 0.8 K/UL (ref 0.9–3.6)
LYMPHOCYTES NFR BLD: 15 % (ref 21–52)
MCH RBC QN AUTO: 25.6 PG (ref 24–34)
MCHC RBC AUTO-ENTMCNC: 32.5 G/DL (ref 31–37)
MCV RBC AUTO: 78.9 FL (ref 78–100)
MONOCYTES # BLD: 0.7 K/UL (ref 0.05–1.2)
MONOCYTES NFR BLD: 12 % (ref 3–10)
NEUTS SEG # BLD: 3.6 K/UL (ref 1.8–8)
NEUTS SEG NFR BLD: 64 % (ref 40–73)
NRBC # BLD: 0 K/UL (ref 0–0.01)
NRBC BLD-RTO: 0 PER 100 WBC
P-R INTERVAL, ECG05: 158 MS
PLATELET # BLD AUTO: 164 K/UL (ref 135–420)
POTASSIUM SERPL-SCNC: 3.4 MMOL/L (ref 3.5–5.5)
Q-T INTERVAL, ECG07: 370 MS
QRS DURATION, ECG06: 86 MS
QTC CALCULATION (BEZET), ECG08: 462 MS
RBC # BLD AUTO: 3.55 M/UL (ref 4.35–5.65)
SODIUM SERPL-SCNC: 140 MMOL/L (ref 136–145)
VENTRICULAR RATE, ECG03: 94 BPM
WBC # BLD AUTO: 5.6 K/UL (ref 4.6–13.2)

## 2023-02-09 PROCEDURE — C1894 INTRO/SHEATH, NON-LASER: HCPCS

## 2023-02-09 PROCEDURE — 74011250637 HC RX REV CODE- 250/637: Performed by: HOSPITALIST

## 2023-02-09 PROCEDURE — 0J2TXYZ CHANGE OTHER DEVICE IN TRUNK SUBCUTANEOUS TISSUE AND FASCIA, EXTERNAL APPROACH: ICD-10-PCS | Performed by: SURGERY

## 2023-02-09 PROCEDURE — 85025 COMPLETE CBC W/AUTO DIFF WBC: CPT

## 2023-02-09 PROCEDURE — 9990 CHARGE CONVERSION

## 2023-02-09 PROCEDURE — 82962 GLUCOSE BLOOD TEST: CPT

## 2023-02-09 PROCEDURE — 75827 VEIN X-RAY CHEST: CPT

## 2023-02-09 PROCEDURE — 36557 INSERT TUNNELED CV CATH: CPT

## 2023-02-09 PROCEDURE — 80048 BASIC METABOLIC PNL TOTAL CA: CPT

## 2023-02-09 PROCEDURE — 74011250637 HC RX REV CODE- 250/637: Performed by: STUDENT IN AN ORGANIZED HEALTH CARE EDUCATION/TRAINING PROGRAM

## 2023-02-09 PROCEDURE — 77001 FLUOROGUIDE FOR VEIN DEVICE: CPT

## 2023-02-09 PROCEDURE — 74011000250 HC RX REV CODE- 250: Performed by: SURGERY

## 2023-02-09 PROCEDURE — 36581 REPLACE TUNNELED CV CATH: CPT

## 2023-02-09 PROCEDURE — 65270000046 HC RM TELEMETRY

## 2023-02-09 PROCEDURE — 74011250636 HC RX REV CODE- 250/636: Performed by: SURGERY

## 2023-02-09 PROCEDURE — 74011250636 HC RX REV CODE- 250/636: Performed by: HOSPITALIST

## 2023-02-09 PROCEDURE — B5181ZZ FLUOROSCOPY OF SUPERIOR VENA CAVA USING LOW OSMOLAR CONTRAST: ICD-10-PCS | Performed by: SURGERY

## 2023-02-09 PROCEDURE — 74011000636 HC RX REV CODE- 636: Performed by: SURGERY

## 2023-02-09 PROCEDURE — 36415 COLL VENOUS BLD VENIPUNCTURE: CPT

## 2023-02-09 RX ORDER — LOSARTAN POTASSIUM 50 MG/1
100 TABLET ORAL DAILY
Status: DISCONTINUED | OUTPATIENT
Start: 2023-02-10 | End: 2023-02-11 | Stop reason: HOSPADM

## 2023-02-09 RX ORDER — HEPARIN SODIUM 200 [USP'U]/100ML
500 INJECTION, SOLUTION INTRAVENOUS
Status: COMPLETED | OUTPATIENT
Start: 2023-02-09 | End: 2023-02-09

## 2023-02-09 RX ORDER — HEPARIN SODIUM 1000 [USP'U]/ML
10000 INJECTION, SOLUTION INTRAVENOUS; SUBCUTANEOUS
Status: COMPLETED | OUTPATIENT
Start: 2023-02-09 | End: 2023-02-09

## 2023-02-09 RX ORDER — CLINDAMYCIN PHOSPHATE 900 MG/50ML
900 INJECTION, SOLUTION INTRAVENOUS
Status: COMPLETED | OUTPATIENT
Start: 2023-02-09 | End: 2023-02-09

## 2023-02-09 RX ORDER — DEXTROSE MONOHYDRATE AND SODIUM CHLORIDE 5; .45 G/100ML; G/100ML
25 INJECTION, SOLUTION INTRAVENOUS CONTINUOUS
Status: DISPENSED | OUTPATIENT
Start: 2023-02-09 | End: 2023-02-10

## 2023-02-09 RX ORDER — HYDRALAZINE HYDROCHLORIDE 20 MG/ML
10 INJECTION INTRAMUSCULAR; INTRAVENOUS
Status: DISCONTINUED | OUTPATIENT
Start: 2023-02-09 | End: 2023-02-10 | Stop reason: SDUPTHER

## 2023-02-09 RX ORDER — CARVEDILOL 3.12 MG/1
3.12 TABLET ORAL 2 TIMES DAILY
Status: DISCONTINUED | OUTPATIENT
Start: 2023-02-09 | End: 2023-02-11 | Stop reason: HOSPADM

## 2023-02-09 RX ORDER — LIDOCAINE HYDROCHLORIDE 10 MG/ML
1-20 INJECTION, SOLUTION EPIDURAL; INFILTRATION; INTRACAUDAL; PERINEURAL
Status: ACTIVE | OUTPATIENT
Start: 2023-02-09 | End: 2023-02-10

## 2023-02-09 RX ORDER — AMLODIPINE BESYLATE 5 MG/1
10 TABLET ORAL DAILY
Status: DISCONTINUED | OUTPATIENT
Start: 2023-02-09 | End: 2023-02-11 | Stop reason: HOSPADM

## 2023-02-09 RX ADMIN — HEPARIN SODIUM 3000 UNITS: 1000 INJECTION INTRAVENOUS; SUBCUTANEOUS at 16:17

## 2023-02-09 RX ADMIN — IOPAMIDOL 6 ML: 612 INJECTION, SOLUTION INTRAVENOUS at 16:16

## 2023-02-09 RX ADMIN — MIDAZOLAM 1 MG: 1 INJECTION INTRAMUSCULAR; INTRAVENOUS at 15:55

## 2023-02-09 RX ADMIN — AMLODIPINE BESYLATE 10 MG: 5 TABLET ORAL at 13:10

## 2023-02-09 RX ADMIN — HEPARIN SODIUM IN SODIUM CHLORIDE 1000 UNITS: 200 INJECTION INTRAVENOUS at 15:31

## 2023-02-09 RX ADMIN — HYDRALAZINE HYDROCHLORIDE 10 MG: 20 INJECTION INTRAMUSCULAR; INTRAVENOUS at 15:49

## 2023-02-09 RX ADMIN — CLINDAMYCIN PHOSPHATE 900 MG: 900 INJECTION, SOLUTION INTRAVENOUS at 15:35

## 2023-02-09 RX ADMIN — CARVEDILOL 3.12 MG: 3.12 TABLET, FILM COATED ORAL at 13:09

## 2023-02-09 RX ADMIN — SODIUM CHLORIDE, PRESERVATIVE FREE 10 ML: 5 INJECTION INTRAVENOUS at 06:00

## 2023-02-09 RX ADMIN — CARVEDILOL 3.12 MG: 3.12 TABLET, FILM COATED ORAL at 20:59

## 2023-02-09 RX ADMIN — FENTANYL CITRATE 25 MCG: 50 INJECTION, SOLUTION INTRAMUSCULAR; INTRAVENOUS at 15:55

## 2023-02-09 RX ADMIN — SODIUM CHLORIDE, PRESERVATIVE FREE 10 ML: 5 INJECTION INTRAVENOUS at 22:00

## 2023-02-09 RX ADMIN — ACETAMINOPHEN 650 MG: 325 TABLET ORAL at 06:45

## 2023-02-09 RX ADMIN — FENTANYL CITRATE 25 MCG: 50 INJECTION, SOLUTION INTRAMUSCULAR; INTRAVENOUS at 16:05

## 2023-02-09 RX ADMIN — SODIUM CHLORIDE, PRESERVATIVE FREE 10 ML: 5 INJECTION INTRAVENOUS at 13:10

## 2023-02-09 RX ADMIN — HYDRALAZINE HYDROCHLORIDE 10 MG: 20 INJECTION INTRAMUSCULAR; INTRAVENOUS at 01:23

## 2023-02-09 RX ADMIN — PANTOPRAZOLE SODIUM 40 MG: 40 TABLET, DELAYED RELEASE ORAL at 06:33

## 2023-02-09 NOTE — DIALYSIS
GENERAL ASSESSMENT: Patient assessed, stable for treatment. Left femoral udal newly placed flushes and aspirates well. Vital sign stable for treatment. Treatment delayed due to patient using bed pan. Machine #2 passed. Report received from Radha Molina [place].  Treatment initiated and monitored by Cosmo Claire, nurse tech  Dr. Chasity Meléndez ok to run patient for 3 hrs   LUNGS:  Resp Rate 18   [x] Clear  [] Coarse  [] Crackles  [] Wheezing  [] Diminished                                                           [] RLL   [] LLL  [] RUL   [] KYLAH            Respirations:  []Easy  []Labored  []N/A  Cough:  []Productive  []Dry  []N/A               Therapy:  [x]RA   [] Ventilated   [] Intubated   [] Trach            O2 Device:  [] NC   [] NRB  [] Trach Mask  [] BiPaP  Flow:   l/min                                                    CARDIAC: [x] Regular      [] Irregular   [] Rhythm:          [] Monitored   [] Bedside   [] Remotely monitored       EDEMA: [] None   [x]Generalized  [] Pitting [] 1+   [] 2 +   [] 3+    [] 4+        SKIN:   [] Hot     [] Cold    [x] Warm   [x] Dry    [] Diaphoretic                 [] Flushed  [] Jaundiced  [] Cyanotic  [] Pale      LOC:    [x] Alert      [x]Oriented:    [x] Person     [x] Place   [x]Time               [] Confused  [] Lethargic  [] Medicated  [] Non-responsive  [] Non-Verbal     GI / ABDOMEN:                     [] Flat    [] Distended    [x] Soft    [] Firm   []  Obese                   [] Diarrhea   [] FMS [x] Bowel Sounds  [] Nausea  [] Vomiting                   [] NGT  [] OGT  [] PEG  [] Tube Feedings @     mL/hr     / URINE ASSESSMENT:                   [] Voiding    [x] Oliguria  [] Anuria                     []  Olivarez   [] Incontinent  []  Incontinent Brief   []  PureWick     PAIN:  [x] 0 []1  []2   []3   []4   []5   []6   []7   []8   []9   []10                MOBILITY:  [x] Bed    [] Stretcher      All Vitals and Treatment Details on Attached Flowsheet   Patient tolerated treatment . 2310 ml of fluid removed. Dialysis catheter intact, patent and heplocked. Patient remains in his room in stable condition.  Report given to nurse

## 2023-02-09 NOTE — PROGRESS NOTES
Hospitalist Progress Note-critical care note     Patient: Paola Farrar MRN: [id number]  CSN: [id number]    YOB: 1970  Age: 50 y.o. Sex: male    DOA: 2/6/2023 LOS:  LOS: 3 days            Chief complaint: fitting catheter, esdr on HD , hypoglycemia,      Assessment/Plan         Hospital Problems  Date Reviewed: 6/24/2022            Codes Class Noted POA    * (Principal) Admission for fitting and adjustment of dialysis catheter [facility]) ICD-10-CM: Z49.01  ICD-9-CM: V56.1  2/6/2023 Unknown        ESRD (end stage renal disease) ([facility] ICD-10-CM: N18.6  ICD-9-CM: 585.6  2/6/2023 Unknown        History of hypoglycemia ICD-10-CM: Z86.39  ICD-9-CM: V12.29  Unknown Unknown        Hypertensive emergency ICD-10-CM: I16.1  ICD-9-CM: 401.9  8/26/2022 Yes        Hypertension ICD-10-CM: I10  ICD-9-CM: 401.9  6/1/2022 Yes        ESRD (end stage renal disease) on dialysis [facility]) ICD-10-CM: N18.6, Z99.2  ICD-9-CM: 585.6, V45.11  1/29/2020 Yes        Hx of BKA, right ([facility] ICD-10-CM: Z89.511  ICD-9-CM: V49.75  1/29/2020 Yes            hypertensive emergency-resolved   CT head no acute issue-no bleeding  Primo brain No acute intracranial abnormality  Not compliance home medication   Medication adjusted per nephrologist         Bleeding from HD catheter  Stopped by clamping the catheter   Still having issue-continue work per vascular        End-stage renal disease on HD  Hd per nephrologist       Anemia -due to esrd , h/h stable       History of Hypoglycemia-glucose low am   Put hypoglycemia protocol, will give d5 now npo -low rate       Right BKA, fall precaution    Subjective I am fine, still having issue for the catheter       Disposition :1-2 days   Review of systems:    General: No fevers or chills. Cardiovascular: No chest pain or pressure. No palpitations. Pulmonary: No shortness of breath. Gastrointestinal: No nausea, vomiting.      Vital signs/Intake and Output:  Visit Vitals  BP (!) 173/89 (BP 1 Location: Right upper arm)   Pulse 89   Temp 98.8 °F (37.1 °C)   Resp 18   Ht 5' 7\" (1.702 m)   Wt 75 kg (165 lb 5.5 oz)   SpO2 93%   BMI 25.90 kg/m²     Current Shift:  No intake/output data recorded. Last three shifts:  02/07 1901 - 02/09 0700  In: 320 [P.O.:320]  Out: 2310     Physical Exam:  General: WD, WN. Alert, cooperative, no acute distress    HEENT: NC, Atraumatic. PERRLA, anicteric sclerae. Lungs: CTA Bilaterally. No Wheezing/Rhonchi/Rales. Tdc noted on left chest   Heart:  Regular  rhythm,  No murmur, No Rubs, No Gallops  Abdomen: Soft, Non distended, Non tender. +Bowel sounds,   Extremities: No c/c/e  Psych:   Not anxious or agitated. Neurologic:  No acute neurological deficit. Labs: Results:       Chemistry Recent Labs     02/09/23 0325 02/08/23 0451 02/07/23 0459   GLU 88 79 67*    140 140   K 3.4* 3.8 3.8    106 105   CO2 26 22 24   BUN 28* 47* 55*   CREA 6.41* 9.12* 10.30*   CA 8.7 8.6 8.3*   AGAP 9 12 11   BUCR 4* 5* 5*        CBC w/Diff Recent Labs     02/09/23 0325 02/08/23 0451 02/07/23 0459   WBC 5.6 6.0 7.6   RBC 3.55* 3.25* 3.36*   HGB 9.1* 8.3* 8.6*   HCT 28.0* 25.5* 27.1*    165 166   GRANS 64 67 68   LYMPH 15* 11* 10*   EOS 8* 10* 9*        Cardiac Enzymes No results for input(s): CPK, CKND1, WILLIE in the last 72 hours. No lab exists for component: CKRMB, TROIP   Coagulation No results for input(s): PTP, INR, APTT, INREXT, INREXT in the last 72 hours. Lipid Panel Lab Results   Component Value Date/Time    Cholesterol, total 115 06/02/2022 09:45 AM    HDL Cholesterol 62 (H) 06/02/2022 09:45 AM    LDL, calculated 46 06/02/2022 09:45 AM    VLDL, calculated 7 06/02/2022 09:45 AM    Triglyceride 35 06/02/2022 09:45 AM    CHOL/HDL Ratio 1.9 06/02/2022 09:45 AM      BNP No results for input(s): BNPP in the last 72 hours. Liver Enzymes No results for input(s): TP, ALB, TBIL, AP in the last 72 hours.     No lab exists for component: SGOT, GPT, DBIL Thyroid Studies Lab Results   Component Value Date/Time    TSH 2.38 08/29/2021 01:20 AM        Procedures/imaging: see electronic medical records for all procedures/Xrays and details which were not copied into this note but were reviewed prior to creation of Plan    CT HEAD WO CONT    Result Date: 2/6/2023  EXAM:  CT HEAD WO CONT INDICATION:   HYPERTENSIVE EMERGENCY COMPARISON: CT head 6/1/2022. TECHNIQUE: Unenhanced CT of the head was performed using 5 mm images. Brain and bone windows were generated. CT dose reduction was achieved through use of a standardized protocol tailored for this examination and automatic exposure control for dose modulation. FINDINGS: There is an age-indeterminate infarct in the right inferior cerebellum. There are additional small chronic infarcts noted in the right arden and right cerebellum. The ventricles are normal in size and position. Basilar cisterns are patent. No midline shift. There is no evidence of acute hemorrhage. The paranasal sinuses, mastoid air cells, and middle ears are clear. The orbital contents are within normal limits. There are no significant osseous or extracranial soft tissue lesions. 1. Age-indeterminate infarct in the right inferior cerebellum, which may be acute to subacute. Recommend MRI for further evaluation. 2. Additional small chronic infarcts in the right arden and right cerebellum. 3. No evidence of acute hemorrhage. XR CHEST PORT    Result Date: 2/6/2023  INDICATION:  missed dialysis EXAM: Single portable view of chest 1323 . Comparison:8/28/2022 Findings: Cardiac silhouette is enlarged. Pulmonary vasculature is not engorged. There are no focal parenchymal opacities, effusions, or pneumothorax. There is a tunneled left subclavian central line. Vascular stent overlies the proximal left forearm.      1. Enlarged cardiac silhouette, no evidence of pulmonary edema      Anisha June MD

## 2023-02-09 NOTE — PROGRESS NOTES
Problem: Falls - Risk of  Goal: *Absence of Falls  Description: Document Conception Brawn Fall Risk and appropriate interventions in the flowsheet. Outcome: Progressing Towards Goal  Note: Fall Risk Interventions:  Mobility Interventions: Bed/chair exit alarm         Medication Interventions: Patient to call before getting OOB    Elimination Interventions: Call light in reach              Problem: Risk for Spread of Infection  Goal: Prevent transmission of infectious organism to others  Description: Prevent the transmission of infectious organisms to other patients, staff members, and visitors. Outcome: Progressing Towards Goal     Problem: Patient Education:  Go to Education Activity  Goal: Patient/Family Education  Outcome: Progressing Towards Goal     Problem: General Medical Care Plan  Goal: *Vital signs within specified parameters  Outcome: Progressing Towards Goal     Problem: Pressure Injury - Risk of  Goal: *Prevention of pressure injury  Description: Document Tucker Scale and appropriate interventions in the flowsheet. Outcome: Progressing Towards Goal  Note: Pressure Injury Interventions:  Sensory Interventions: Assess changes in LOC, Minimize linen layers         Activity Interventions: PT/OT evaluation    Mobility Interventions: Assess need for specialty bed, HOB 30 degrees or less, Turn and reposition approx.  every two hours(pillow and wedges)    Nutrition Interventions: Document food/fluid/supplement intake, Offer support with meals,snacks and hydration

## 2023-02-09 NOTE — PROGRESS NOTES
TRANSFER - OUT REPORT:    Verbal report given to [address] on [address] [place]  being transferred to [facility] for routine progression of care       Report consisted of patients Situation, Background, Assessment and   Recommendations(SBAR). Information from the following report(s) SBAR, Procedure Summary and MAR was reviewed with the receiving nurse. Lines:   Peripheral IV 65/82/54 Right Basilic (Active)        Opportunity for questions and clarification was provided.       Patient transported with:   [employer]

## 2023-02-09 NOTE — PROCEDURES
Vascular Surgery  Operative Note    Date: 2/9/2023    Pre-Op Dx : ESRD on dialysis with dysfunctional tunneled hemodialysis catheter    Post-Op Dx: Same    Procedure :  1. Removal of left subclavian tunneled hemodialysis catheter   2. Venography of the superior vena cava  3. Insertion of new 27 tunneled hemodialysis catheter (15.5)  5. Radiographic supervision and interpretation    Surgeon: Ernie Wynn MD    Assistant: None    Anesthesia:  Conscious Moderate Sedation was Administered under my supervision- IV Fentanyl 75 mcg and Versed 1 mg. Patient was constantly monitored with continuous cardiac monitoring and pulse oximetry. Time of Sedation from 1555 until 1615. Independent trained observer (RN/RCIS) was present during the course of the procedure. Indication: Patient has a malfunctioning left subclavian TDC catheter    Description of Procedure:     After informed consent was obtained, the patient was brought to the angio suite and positioned supine on the table. A safety pause was performed with all necessary personnel and equipment in the room. Antibiotics were given. Conscious sedation was administered. The left neck, chest and existing tunneled hemodialysis catheter were prepped and draped in sterile fashion. Local anesthetic was infiltrated into the subcutaneous tissue around the catheter tunnel. Using blunt dissection, the cuff was freed from adjacent tissue. An 0.035 wire was then used to cannulate the [facility] and was positioned into the IVC. The catheter was than removed over wire and a new 27 tunneled dialysis catheter was advanced terminating at the cavo-atrial junction. There was resistance under the clavicle as there was previously placed subclavian line and there was heavy scar. We dilated it with the standard dilators and the peel-away sheath and advanced it into the appropriate position. Central venography was performed.   Central veins were patent without notable fibrin sheath. Final fluoroscopic image was stored in the system confirming appropriate catheter placement without kinks and adequate lung inflation. Both ports aspirated briskly and were easily flushed with heparinized saline, packed with heparin, and secured with caps. The catheter was secured to the skin with nylon suture. The area was cleaned, dried, and sterile occlusive dressings were applied. Patient tolerated the procedure well and was transported to recovery in stable condition. Complications:   None    EBL : Minimal    Contrast : 5 ml.   Yolande Villasenor MD  [facility]    (G) [phone]

## 2023-02-09 NOTE — PROGRESS NOTES
[facility] vascular specialist    Mr. Zainab Mock is a 62-year-old gentleman who underwent a TDC placement several days ago and it was malfunctioning. There are high arterial pressures. Patient was brought back to the interventional radiology suite for evaluation and possible replacement of the [facility] catheter in the subclavian position which was placed. 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     Britney Madrigal MD

## 2023-02-09 NOTE — PROGRESS NOTES
RENAL CONSULT  2023    Patient:  Agapito Kuo  :  1970  Gender:  male  MRN #:  [id number]    Reason for Consult: ESRD related care and high blood pressure     Subjective:   Says he feels fine , no shortness of breath   BP above target range   No new concern     Objective:    Visit Vitals  BP (!) 173/89 (BP 1 Location: Right upper arm)   Pulse 89   Temp 98.8 °F (37.1 °C)   Resp 18   Ht 5' 7\" (1.702 m)   Wt 75 kg (165 lb 5.5 oz)   SpO2 93%   BMI 25.90 kg/m²       Physical Exam:    Pt awake,  alert and comfortable  Lung: clear to auscultation  Ext: no edema in LLE, RLE- BKA     Laboratory Data:    Lab Results   Component Value Date    BUN 28 (H) 2023    BUN 47 (H) 2023    BUN 55 (H) 2023     2023     2023     2023    CO2 26 2023    CO2 22 2023    CO2 24 2023     Lab Results   Component Value Date    WBC 5.6 2023    HGB 9.1 (L) 2023    HCT 28.0 (L) 2023     No components found for: CALCIUM, PHOSPHORUS, MAGNESIUM  Lab Results   Component Value Date    HDL 62 (H) 2022     No results found for: SPECIMENTYP, TURBIDITY, UGLU    Imaging Reveiwed:    CXR:-    IMPRESSION  1.  Enlarged cardiac silhouette, no evidence of pulmonary edema    Assessment:Danny Carrasquillo is a 50y.o. year old maleh/o ESRD on HD , non compliant with dialysis  admitted for malfunctioning dialysis catheter   Does not have volume overload and hyperkalemia  Has uncontrolled hypertension        Hypertension   ESRD  Anemia of CKD   Hyperphosphatemia   Secondary Hyperparathyroidism      Plan:       Hypertension- BP now above target range and was high post HD last night   Resume amlodipine 10 mg daily, decrease coreg to 3.125 mg BID   Losartan 100 mg daily   Hold doxazosin, spironolactone and imdur, resume later depending on BP , he is non complaint to medicine at home, does not adhere   He generally does not need all antihypertensive in house ,  which was prescribed as an outpatient    Hydralazine iv prn as ordered        ESRD-   Patient examined and labs reviewed, no clinical and metabolic indication of dialysis today   Vascular planning to replace [facility] today   Next HD Friday        Anemia- EVIE as indicated after 14 days of mircera dose as outpatient      Bone mineral disease  Hyperphosphatemia- sevelamer  800 mg TID    Secondary hyperparathyroidism- hectoral 4 mcg iv on Friday    No barrier to discharge from nephrology after [facility] today   Told him not to miss dialysis tomorrow at outside dialysis center     Joe Javier MD, MD  [facility].- Nephrology

## 2023-02-10 LAB
ANION GAP SERPL CALC-SCNC: 12 MMOL/L (ref 3–18)
BASOPHILS # BLD: 0 K/UL (ref 0–0.1)
BASOPHILS NFR BLD: 1 % (ref 0–2)
BUN SERPL-MCNC: 38 MG/DL (ref 7–18)
BUN/CREAT SERPL: 5 (ref 12–20)
CALCIUM SERPL-MCNC: 8.9 MG/DL (ref 8.5–10.1)
CHLORIDE SERPL-SCNC: 104 MMOL/L (ref 100–111)
CO2 SERPL-SCNC: 22 MMOL/L (ref 21–32)
CREAT SERPL-MCNC: 8.06 MG/DL (ref 0.6–1.3)
DIFFERENTIAL METHOD BLD: ABNORMAL
EOSINOPHIL # BLD: 0.4 K/UL (ref 0–0.4)
EOSINOPHIL NFR BLD: 8 % (ref 0–5)
ERYTHROCYTE [DISTWIDTH] IN BLOOD BY AUTOMATED COUNT: 23.9 % (ref 11.6–14.5)
GLUCOSE BLD STRIP.AUTO-MCNC: 91 MG/DL (ref 70–110)
GLUCOSE SERPL-MCNC: 85 MG/DL (ref 74–99)
HCT VFR BLD AUTO: 28.2 % (ref 36–48)
HGB BLD-MCNC: 9 G/DL (ref 13–16)
IMM GRANULOCYTES # BLD AUTO: 0 K/UL (ref 0–0.04)
IMM GRANULOCYTES NFR BLD AUTO: 0 % (ref 0–0.5)
LYMPHOCYTES # BLD: 0.7 K/UL (ref 0.9–3.6)
LYMPHOCYTES NFR BLD: 16 % (ref 21–52)
MCH RBC QN AUTO: 25.6 PG (ref 24–34)
MCHC RBC AUTO-ENTMCNC: 31.9 G/DL (ref 31–37)
MCV RBC AUTO: 80.1 FL (ref 78–100)
MONOCYTES # BLD: 0.6 K/UL (ref 0.05–1.2)
MONOCYTES NFR BLD: 13 % (ref 3–10)
NEUTS SEG # BLD: 2.8 K/UL (ref 1.8–8)
NEUTS SEG NFR BLD: 62 % (ref 40–73)
NRBC # BLD: 0 K/UL (ref 0–0.01)
NRBC BLD-RTO: 0 PER 100 WBC
PLATELET # BLD AUTO: 163 K/UL (ref 135–420)
POTASSIUM SERPL-SCNC: 3.9 MMOL/L (ref 3.5–5.5)
RBC # BLD AUTO: 3.52 M/UL (ref 4.35–5.65)
SODIUM SERPL-SCNC: 138 MMOL/L (ref 136–145)
WBC # BLD AUTO: 4.5 K/UL (ref 4.6–13.2)

## 2023-02-10 PROCEDURE — 82962 GLUCOSE BLOOD TEST: CPT

## 2023-02-10 PROCEDURE — 9990 CHARGE CONVERSION

## 2023-02-10 PROCEDURE — 36415 COLL VENOUS BLD VENIPUNCTURE: CPT

## 2023-02-10 PROCEDURE — 80048 BASIC METABOLIC PNL TOTAL CA: CPT

## 2023-02-10 PROCEDURE — 74011000250 HC RX REV CODE- 250: Performed by: HOSPITALIST

## 2023-02-10 PROCEDURE — 90935 HEMODIALYSIS ONE EVALUATION: CPT

## 2023-02-10 PROCEDURE — 85025 COMPLETE CBC W/AUTO DIFF WBC: CPT

## 2023-02-10 PROCEDURE — 74011250637 HC RX REV CODE- 250/637: Performed by: STUDENT IN AN ORGANIZED HEALTH CARE EDUCATION/TRAINING PROGRAM

## 2023-02-10 PROCEDURE — 74011250637 HC RX REV CODE- 250/637: Performed by: HOSPITALIST

## 2023-02-10 PROCEDURE — 65270000046 HC RM TELEMETRY

## 2023-02-10 PROCEDURE — 74011250636 HC RX REV CODE- 250/636: Performed by: STUDENT IN AN ORGANIZED HEALTH CARE EDUCATION/TRAINING PROGRAM

## 2023-02-10 RX ORDER — SEVELAMER CARBONATE 800 MG/1
800 TABLET, FILM COATED ORAL
Status: DISCONTINUED | OUTPATIENT
Start: 2023-02-11 | End: 2023-02-11 | Stop reason: HOSPADM

## 2023-02-10 RX ORDER — SPIRONOLACTONE 25 MG/1
25 TABLET ORAL DAILY
Status: DISCONTINUED | OUTPATIENT
Start: 2023-02-11 | End: 2023-02-11 | Stop reason: HOSPADM

## 2023-02-10 RX ORDER — ACETAMINOPHEN 650 MG/1
650 SUPPOSITORY RECTAL EVERY 6 HOURS PRN
Status: DISCONTINUED | OUTPATIENT
Start: 2023-02-11 | End: 2023-02-11 | Stop reason: HOSPADM

## 2023-02-10 RX ORDER — SPIRONOLACTONE 25 MG/1
25 TABLET ORAL DAILY
Status: DISCONTINUED | OUTPATIENT
Start: 2023-02-10 | End: 2023-02-11 | Stop reason: HOSPADM

## 2023-02-10 RX ORDER — DOXERCALCIFEROL 4 UG/2ML
4 INJECTION INTRAVENOUS ONCE
Status: DISCONTINUED | OUTPATIENT
Start: 2023-02-10 | End: 2023-02-11 | Stop reason: HOSPADM

## 2023-02-10 RX ORDER — HYDRALAZINE HYDROCHLORIDE 20 MG/ML
20 INJECTION INTRAMUSCULAR; INTRAVENOUS EVERY 6 HOURS PRN
Status: DISCONTINUED | OUTPATIENT
Start: 2023-02-11 | End: 2023-02-11 | Stop reason: HOSPADM

## 2023-02-10 RX ORDER — ACETAMINOPHEN 325 MG/1
650 TABLET ORAL EVERY 6 HOURS PRN
Status: DISCONTINUED | OUTPATIENT
Start: 2023-02-11 | End: 2023-02-11 | Stop reason: HOSPADM

## 2023-02-10 RX ORDER — PANTOPRAZOLE SODIUM 40 MG/1
40 TABLET, DELAYED RELEASE ORAL
Status: DISCONTINUED | OUTPATIENT
Start: 2023-02-11 | End: 2023-02-11 | Stop reason: HOSPADM

## 2023-02-10 RX ORDER — PROMETHAZINE HYDROCHLORIDE 25 MG/1
12.5 TABLET ORAL EVERY 6 HOURS PRN
Status: DISCONTINUED | OUTPATIENT
Start: 2023-02-11 | End: 2023-02-11 | Stop reason: HOSPADM

## 2023-02-10 RX ORDER — AMLODIPINE BESYLATE 5 MG/1
10 TABLET ORAL DAILY
Status: DISCONTINUED | OUTPATIENT
Start: 2023-02-11 | End: 2023-02-11 | Stop reason: HOSPADM

## 2023-02-10 RX ORDER — ISOSORBIDE MONONITRATE 60 MG/1
60 TABLET, EXTENDED RELEASE ORAL DAILY
Status: DISCONTINUED | OUTPATIENT
Start: 2023-02-11 | End: 2023-02-11 | Stop reason: HOSPADM

## 2023-02-10 RX ORDER — SODIUM CHLORIDE 0.9 % (FLUSH) 0.9 %
5-40 SYRINGE (ML) INJECTION EVERY 12 HOURS SCHEDULED
Status: DISCONTINUED | OUTPATIENT
Start: 2023-02-11 | End: 2023-02-11 | Stop reason: HOSPADM

## 2023-02-10 RX ORDER — CARVEDILOL 3.12 MG/1
3.12 TABLET ORAL 2 TIMES DAILY WITH MEALS
Status: DISCONTINUED | OUTPATIENT
Start: 2023-02-11 | End: 2023-02-11 | Stop reason: HOSPADM

## 2023-02-10 RX ORDER — ONDANSETRON 2 MG/ML
4 INJECTION INTRAMUSCULAR; INTRAVENOUS EVERY 6 HOURS PRN
Status: DISCONTINUED | OUTPATIENT
Start: 2023-02-11 | End: 2023-02-11 | Stop reason: HOSPADM

## 2023-02-10 RX ORDER — DEXTROSE MONOHYDRATE 100 MG/ML
INJECTION, SOLUTION INTRAVENOUS CONTINUOUS PRN
Status: DISCONTINUED | OUTPATIENT
Start: 2023-02-11 | End: 2023-02-11 | Stop reason: HOSPADM

## 2023-02-10 RX ORDER — HEPARIN SODIUM 1000 [USP'U]/ML
4600 INJECTION, SOLUTION INTRAVENOUS; SUBCUTANEOUS CONTINUOUS
Status: DISCONTINUED | OUTPATIENT
Start: 2023-02-10 | End: 2023-02-11 | Stop reason: HOSPADM

## 2023-02-10 RX ORDER — BISACODYL 10 MG
10 SUPPOSITORY, RECTAL RECTAL DAILY PRN
Status: DISCONTINUED | OUTPATIENT
Start: 2023-02-11 | End: 2023-02-11 | Stop reason: HOSPADM

## 2023-02-10 RX ORDER — LOSARTAN POTASSIUM 50 MG/1
100 TABLET ORAL DAILY
Status: DISCONTINUED | OUTPATIENT
Start: 2023-02-11 | End: 2023-02-11 | Stop reason: HOSPADM

## 2023-02-10 RX ORDER — DOXAZOSIN MESYLATE 1 MG/1
2 TABLET ORAL EVERY 12 HOURS SCHEDULED
Status: DISCONTINUED | OUTPATIENT
Start: 2023-02-11 | End: 2023-02-11 | Stop reason: HOSPADM

## 2023-02-10 RX ORDER — CARVEDILOL 3.12 MG/1
3.12 TABLET ORAL 2 TIMES DAILY
Qty: 60 TABLET | Refills: 0 | Status: SHIPPED | OUTPATIENT
Start: 2023-02-10

## 2023-02-10 RX ORDER — SODIUM CHLORIDE 9 MG/ML
INJECTION, SOLUTION INTRAVENOUS PRN
Status: DISCONTINUED | OUTPATIENT
Start: 2023-02-10 | End: 2023-02-10

## 2023-02-10 RX ORDER — HEPARIN SODIUM 1000 [USP'U]/ML
4600 INJECTION, SOLUTION INTRAVENOUS; SUBCUTANEOUS
Status: DISCONTINUED | OUTPATIENT
Start: 2023-02-10 | End: 2023-02-11 | Stop reason: HOSPADM

## 2023-02-10 RX ORDER — SODIUM CHLORIDE 0.9 % (FLUSH) 0.9 %
5-40 SYRINGE (ML) INJECTION PRN
Status: DISCONTINUED | OUTPATIENT
Start: 2023-02-11 | End: 2023-02-11 | Stop reason: HOSPADM

## 2023-02-10 RX ADMIN — SEVELAMER CARBONATE 800 MG: 800 TABLET, FILM COATED ORAL at 08:00

## 2023-02-10 RX ADMIN — PANTOPRAZOLE SODIUM 40 MG: 40 TABLET, DELAYED RELEASE ORAL at 06:41

## 2023-02-10 RX ADMIN — SODIUM CHLORIDE, PRESERVATIVE FREE 10 ML: 5 INJECTION INTRAVENOUS at 06:42

## 2023-02-10 RX ADMIN — CARVEDILOL 3.12 MG: 3.12 TABLET, FILM COATED ORAL at 22:55

## 2023-02-10 RX ADMIN — LOSARTAN POTASSIUM 100 MG: 50 TABLET, FILM COATED ORAL at 12:13

## 2023-02-10 RX ADMIN — ACETAMINOPHEN 650 MG: 325 TABLET ORAL at 02:50

## 2023-02-10 RX ADMIN — SODIUM CHLORIDE, PRESERVATIVE FREE 10 ML: 5 INJECTION INTRAVENOUS at 23:10

## 2023-02-10 RX ADMIN — HEPARIN SODIUM 4600 UNITS: 1000 INJECTION INTRAVENOUS; SUBCUTANEOUS at 20:21

## 2023-02-10 RX ADMIN — SEVELAMER CARBONATE 800 MG: 800 TABLET, FILM COATED ORAL at 12:13

## 2023-02-10 RX ADMIN — DOXAZOSIN 2 MG: 1 TABLET ORAL at 22:55

## 2023-02-10 RX ADMIN — AMLODIPINE BESYLATE 10 MG: 5 TABLET ORAL at 12:13

## 2023-02-10 RX ADMIN — CARVEDILOL 3.12 MG: 3.12 TABLET, FILM COATED ORAL at 12:13

## 2023-02-10 NOTE — PROGRESS NOTES
RENAL CONSULT  2/10/2023    Patient:  Jacki Dove  :  1970  Gender:  male  MRN #:  [id number]    Reason for Consult: ESRD related care and high blood pressure     Subjective:   Says he feels fine , no shortness of breath   BP above target range   No new concern   Has new TDC yesterday     Objective:    Visit Vitals  BP (!) 187/89 (BP 1 Location: Left upper arm, BP Patient Position: Lying) Comment: Retake   Pulse 89   Temp 98.6 °F (37 °C)   Resp 18   Ht 5' 7\" (1.702 m)   Wt 75 kg (165 lb 5.5 oz)   SpO2 100%   BMI 25.90 kg/m²       Physical Exam:    Pt awake,  alert and comfortable  Lung: clear to auscultation  Ext: no edema in LLE, RLE- BKA     Laboratory Data:    Lab Results   Component Value Date    BUN 38 (H) 02/10/2023    BUN 28 (H) 2023    BUN 47 (H) 2023     02/10/2023     2023     2023    CO2 22 02/10/2023    CO2 26 2023    CO2 22 2023     Lab Results   Component Value Date    WBC 4.5 (L) 02/10/2023    HGB 9.0 (L) 02/10/2023    HCT 28.2 (L) 02/10/2023     No components found for: CALCIUM, PHOSPHORUS, MAGNESIUM  Lab Results   Component Value Date    HDL 62 (H) 2022     No results found for: SPECIMENTYP, TURBIDITY, UGLU    Imaging Reveiwed:    CXR:-    IMPRESSION  1.  Enlarged cardiac silhouette, no evidence of pulmonary edema    Assessment:James Eloise Mohs is a 50y.o. year old maleh/o ESRD on HD , non compliant with dialysis  admitted for malfunctioning dialysis catheter   Does not have volume overload and hyperkalemia  Has uncontrolled hypertension        Hypertension   ESRD  Anemia of CKD   Hyperphosphatemia   Secondary Hyperparathyroidism      Plan:       Hypertension- BP now above target rang  Continue  amlodipine 10 mg daily,  coreg  3.125 mg BID   Losartan 100 mg daily   Resume doxazosin and spironolactone   Hydralazine iv prn as ordered   Should improve post HD        ESRD-   Patient examined and labs reviewed,plan for dialysis today for volume removal and clearance   Next HD Monday         Anemia- EVIE as indicated after 14 days of mircera dose as outpatient      Bone mineral disease  Hyperphosphatemia- sevelamer  800 mg TID    Secondary hyperparathyroidism- hectoral 4 mcg iv today    No barrier to discharge from nephrology after dialysis today     Leah Ta MD, MD  [facility].- Nephrology

## 2023-02-10 NOTE — DISCHARGE SUMMARY
Discharge Summary    Patient: Buck Coy MRN: [id number]  CSN: [id number]    YOB: 1970  Age: 50 y.o. Sex: male    DOA: 2/6/2023 LOS:  LOS: 4 days   Discharge Date:      Primary Care Provider:  Isaiah Centeno MD    Admission Diagnoses: Admission for fitting and adjustment of dialysis catheter ([place] [address].) [Z49.01]  ESRD (end stage renal disease) ([place] [address].) [N18.6]    Discharge Diagnoses:    Hospital Problems  Date Reviewed: 6/24/2022            Codes Class Noted POA    * (Principal) Admission for fitting and adjustment of dialysis catheter [facility]) ICD-10-CM: Z49.01  ICD-9-CM: V56.1  2/6/2023 Unknown        ESRD (end stage renal disease) ([place] [address].) ICD-10-CM: N18.6  ICD-9-CM: 585.6  2/6/2023 Unknown        History of hypoglycemia ICD-10-CM: Z86.39  ICD-9-CM: V12.29  Unknown Unknown        Hypertensive emergency ICD-10-CM: I16.1  ICD-9-CM: 401.9  8/26/2022 Yes        Hypertension ICD-10-CM: I10  ICD-9-CM: 401.9  6/1/2022 Yes        ESRD (end stage renal disease) on dialysis [facility]) ICD-10-CM: N18.6, Z99.2  ICD-9-CM: 585.6, V45.11  1/29/2020 Yes        Hx of BKA, right ([place] [address].) ICD-10-CM: Z89.511  ICD-9-CM: V49.75  1/29/2020 Yes           Discharge Condition: stable     Discharge Medications:     Current Discharge Medication List        START taking these medications    Details   carvediloL (COREG) 3.125 mg tablet Take 1 Tablet by mouth two (2) times a day. Qty: 60 Tablet, Refills: 0  Start date: 2/10/2023           CONTINUE these medications which have NOT CHANGED    Details   amLODIPine (NORVASC) 10 mg tablet Take 1 Tablet by mouth daily. Qty: 30 Tablet, Refills: 0      losartan (COZAAR) 100 mg tablet Take 1 Tablet by mouth daily. Qty: 30 Tablet, Refills: 0      spironolactone (ALDACTONE) 100 mg tablet Take 1 Tablet by mouth daily. Qty: 30 Tablet, Refills: 0      isosorbide mononitrate ER (IMDUR) 60 mg CR tablet Take 1 Tablet by mouth daily.   Qty: 30 Tablet, Refills: 0      doxazosin (CARDURA) 4 mg tablet Take 2 mg by mouth daily. omeprazole (PRILOSEC) 40 mg capsule Take 40 mg by mouth daily. sevelamer carbonate (RENVELA) 800 mg tab tab Take 800 mg by mouth three (3) times daily (with meals). Procedures : TDC placement     Consults: Vascular Surgery      PHYSICAL EXAM   Visit Vitals  BP (!) 187/89 (BP 1 Location: Left upper arm, BP Patient Position: Lying)   Pulse 89   Temp 98.6 °F (37 °C)   Resp 18   Ht 5' 7\" (1.702 m)   Wt 75 kg (165 lb 5.5 oz)   SpO2 100%   BMI 25.90 kg/m²     General: Awake, cooperative, no acute distress    HEENT: NC, Atraumatic. PERRLA, EOMI. Anicteric sclerae. Lungs:  CTA Bilaterally. No Wheezing/Rhonchi/Rales. Heart:  Regular  rhythm,  No murmur, No Rubs, No Gallops  Abdomen: Soft, Non distended, Non tender. +Bowel sounds,   Extremities: No c/c/e, rt bka   Psych:   Not anxious or agitated. Neurologic:  No acute neurological deficits. Admission HPI :      Saeid Rankin is a 50 y.o. male with history of hypertension, hypoglycemia presented to ER due to bleeding from [facility] catheter. He noted some blood on his shirt on Sunday while he was at [employer]. It  stopped by its self. He went to HD clinic today, blood came  out from the catheter. He was sent to ER immediately. Vascular surgeon has been consulted. Bleeding was stopped with a clamp. Vascular is planning to change catheter tomorrow. He also presented hypertension in ER. He denies any chest pain no shortness of breath. Chest x-ray no evidence of pulmonary edema. Hospital Course :   Pt was admitted for  bleeding from hd catheter. Vascular surgeon consulted and new tdc was administrated. His h/h has been monitored and his h/h has been stable. He also presented hypertensive emergency on admission,    CT head no acute issue-no bleeding and Primo brain No acute intracranial abnormality.  He is not compliance home medication   Medication adjusted per nephrologist. He received hd during his stay. He has  Hypoglycemia while on npo, he received d5 and resolved per eating. He has  Right BKA, fall precaution was performed during his stay. Discharge planning discussed with patient, pt agrees  with the plan and no questions and concerns at this point. Activity: Activity as tolerated    Diet: Renal Diet     Follow-up: PCP HD clinic     Disposition: home     Minutes spent on discharge: 45 min       Labs: Results:       Chemistry Recent Labs     02/10/23  0416 02/09/23  0325 02/08/23  0451   GLU 85 88 79    140 140   K 3.9 3.4* 3.8    105 106   CO2 22 26 22   BUN 38* 28* 47*   CREA 8.06* 6.41* 9.12*   CA 8.9 8.7 8.6   AGAP 12 9 12   BUCR 5* 4* 5*      CBC w/Diff Recent Labs     02/10/23  0416 02/09/23  0325 02/08/23  0451   WBC 4.5* 5.6 6.0   RBC 3.52* 3.55* 3.25*   HGB 9.0* 9.1* 8.3*   HCT 28.2* 28.0* 25.5*    164 165   GRANS 62 64 67   LYMPH 16* 15* 11*   EOS 8* 8* 10*      Cardiac Enzymes No results for input(s): CPK, CKND1, WILLIE in the last 72 hours. No lab exists for component: CKRMB, TROIP   Coagulation No results for input(s): PTP, INR, APTT, INREXT, INREXT in the last 72 hours. Lipid Panel Lab Results   Component Value Date/Time    Cholesterol, total 115 06/02/2022 09:45 AM    HDL Cholesterol 62 (H) 06/02/2022 09:45 AM    LDL, calculated 46 06/02/2022 09:45 AM    VLDL, calculated 7 06/02/2022 09:45 AM    Triglyceride 35 06/02/2022 09:45 AM    CHOL/HDL Ratio 1.9 06/02/2022 09:45 AM      BNP No results for input(s): BNPP in the last 72 hours. Liver Enzymes No results for input(s): TP, ALB, TBIL, AP in the last 72 hours. No lab exists for component: SGOT, GPT, DBIL   Thyroid Studies Lab Results   Component Value Date/Time    TSH 2.38 08/29/2021 01:20 AM            Significant Diagnostic Studies: MRI BRAIN WO CONT    Result Date: 2/7/2023  BRAIN MRI WITHOUT CONTRAST: 2/7/2023 4:31 PM INDICATION: Age-indeterminate right cerebellar infarction.  COMPARISON: CT 2/6/2023. TECHNIQUE: Images were obtained in multiple planes and sequences to emphasize T1, T2, and T2* information. Diffusion-weighted images and ADC maps were also obtained. FINDINGS: Mildly motion limited. No restricted diffusion to suggest acute infarction. There are small, chronic infarctions in the arden and bilateral cerebellar hemispheres. No other parenchymal signal abnormality within the limitations of motion. The ventricles and sulci are appropriate for age. The main intracranial arterial flow-voids are normal. No hemorrhage. No acute intracranial abnormality. CT HEAD WO CONT    Result Date: 2/6/2023  EXAM:  CT HEAD WO CONT INDICATION:   HYPERTENSIVE EMERGENCY COMPARISON: CT head 6/1/2022. TECHNIQUE: Unenhanced CT of the head was performed using 5 mm images. Brain and bone windows were generated. CT dose reduction was achieved through use of a standardized protocol tailored for this examination and automatic exposure control for dose modulation. FINDINGS: There is an age-indeterminate infarct in the right inferior cerebellum. There are additional small chronic infarcts noted in the right arden and right cerebellum. The ventricles are normal in size and position. Basilar cisterns are patent. No midline shift. There is no evidence of acute hemorrhage. The paranasal sinuses, mastoid air cells, and middle ears are clear. The orbital contents are within normal limits. There are no significant osseous or extracranial soft tissue lesions. 1. Age-indeterminate infarct in the right inferior cerebellum, which may be acute to subacute. Recommend MRI for further evaluation. 2. Additional small chronic infarcts in the right arden and right cerebellum. 3. No evidence of acute hemorrhage. XR CHEST PORT    Result Date: 2/6/2023  INDICATION:  missed dialysis EXAM: Single portable view of chest 1323 . Comparison:8/28/2022 Findings: Cardiac silhouette is enlarged. Pulmonary vasculature is not engorged.  There are no focal parenchymal opacities, effusions, or pneumothorax. There is a tunneled left subclavian central line. Vascular stent overlies the proximal left forearm.      1. Enlarged cardiac silhouette, no evidence of pulmonary edema            Bartolo CRENSHAW,Internal Medicine     CC: Melinda Lopez MD

## 2023-02-10 NOTE — PROGRESS NOTES
Hospitalist Progress Note-critical care note     Patient: Husam Cancino MRN: [id number]  CSN: [id number]    YOB: 1970  Age: 50 y.o. Sex: male    DOA: 2/6/2023 LOS:  LOS: 4 days            Chief complaint: fitting catheter, esdr on HD , hypoglycemia,      Assessment/Plan         Hospital Problems  Date Reviewed: 6/24/2022            Codes Class Noted POA    * (Principal) Admission for fitting and adjustment of dialysis catheter [facility]) ICD-10-CM: Z49.01  ICD-9-CM: V56.1  2/6/2023 Unknown        ESRD (end stage renal disease) ([facility] ICD-10-CM: N18.6  ICD-9-CM: 585.6  2/6/2023 Unknown        History of hypoglycemia ICD-10-CM: Z86.39  ICD-9-CM: V12.29  Unknown Unknown        Hypertensive emergency ICD-10-CM: I16.1  ICD-9-CM: 401.9  8/26/2022 Yes        Hypertension ICD-10-CM: I10  ICD-9-CM: 401.9  6/1/2022 Yes        ESRD (end stage renal disease) on dialysis [facility]) ICD-10-CM: N18.6, Z99.2  ICD-9-CM: 585.6, V45.11  1/29/2020 Yes        Hx of BKA, right ([facility] ICD-10-CM: Z89.511  ICD-9-CM: V49.75  1/29/2020 Yes            hypertensive emergency-resolved   CT head no acute issue-no bleeding  Primo brain No acute intracranial abnormality  Not compliance home medication   Medication adjusted per nephrologist         Bleeding from HD catheter   TDC replaced per vascular surgeon-will have hd today      End-stage renal disease on HD  Hd per nephrologist , hd today       Anemia -due to esrd , h/h stable       History of Hypoglycemia-glucose low am   Put hypoglycemia protocol, will give d5 now npo -low rate       Right BKA, fall precaution    Subjective I feel fine, no bleeding any more     Pt is ok to be dc home late today after hd today, discussed with Dr. Monster Coleman -ok with the plan   Disposition :today if continue doing good   Review of systems:    General: No fevers or chills. Cardiovascular: No chest pain or pressure. No palpitations. Pulmonary: No shortness of breath. Gastrointestinal: No nausea, vomiting. Vital signs/Intake and Output:  Visit Vitals  BP (!) 187/89 (BP 1 Location: Left upper arm, BP Patient Position: Lying)   Pulse 89   Temp 98.6 °F (37 °C)   Resp 18   Ht 5' 7\" (1.702 m)   Wt 75 kg (165 lb 5.5 oz)   SpO2 100%   BMI 25.90 kg/m²     Current Shift:  No intake/output data recorded. Last three shifts:  02/08 1901 - 02/10 0700  In: -   Out: 2310     Physical Exam:  General: WD, WN. Alert, cooperative, no acute distress    HEENT: NC, Atraumatic. PERRLA, anicteric sclerae. Lungs: CTA Bilaterally. No Wheezing/Rhonchi/Rales. Tdc noted on left chest   Heart:  Regular  rhythm,  No murmur, No Rubs, No Gallops  Abdomen: Soft, Non distended, Non tender. +Bowel sounds,   Extremities: No c/c/e  Psych:   Not anxious or agitated. Neurologic:  No acute neurological deficit. Labs: Results:       Chemistry Recent Labs     02/10/23  0416 02/09/23  0325 02/08/23  0451   GLU 85 88 79    140 140   K 3.9 3.4* 3.8    105 106   CO2 22 26 22   BUN 38* 28* 47*   CREA 8.06* 6.41* 9.12*   CA 8.9 8.7 8.6   AGAP 12 9 12   BUCR 5* 4* 5*        CBC w/Diff Recent Labs     02/10/23  0416 02/09/23  0325 02/08/23  0451   WBC 4.5* 5.6 6.0   RBC 3.52* 3.55* 3.25*   HGB 9.0* 9.1* 8.3*   HCT 28.2* 28.0* 25.5*    164 165   GRANS 62 64 67   LYMPH 16* 15* 11*   EOS 8* 8* 10*        Cardiac Enzymes No results for input(s): CPK, CKND1, WILLIE in the last 72 hours. No lab exists for component: CKRMB, TROIP   Coagulation No results for input(s): PTP, INR, APTT, INREXT, INREXT in the last 72 hours. Lipid Panel Lab Results   Component Value Date/Time    Cholesterol, total 115 06/02/2022 09:45 AM    HDL Cholesterol 62 (H) 06/02/2022 09:45 AM    LDL, calculated 46 06/02/2022 09:45 AM    VLDL, calculated 7 06/02/2022 09:45 AM    Triglyceride 35 06/02/2022 09:45 AM    CHOL/HDL Ratio 1.9 06/02/2022 09:45 AM      BNP No results for input(s): BNPP in the last 72 hours.    Liver Enzymes No results for input(s): TP, ALB, TBIL, AP in the last 72 hours. No lab exists for component: SGOT, GPT, DBIL     Thyroid Studies Lab Results   Component Value Date/Time    TSH 2.38 08/29/2021 01:20 AM        Procedures/imaging: see electronic medical records for all procedures/Xrays and details which were not copied into this note but were reviewed prior to creation of Plan    CT HEAD WO CONT    Result Date: 2/6/2023  EXAM:  CT HEAD WO CONT INDICATION:   HYPERTENSIVE EMERGENCY COMPARISON: CT head 6/1/2022. TECHNIQUE: Unenhanced CT of the head was performed using 5 mm images. Brain and bone windows were generated. CT dose reduction was achieved through use of a standardized protocol tailored for this examination and automatic exposure control for dose modulation. FINDINGS: There is an age-indeterminate infarct in the right inferior cerebellum. There are additional small chronic infarcts noted in the right arden and right cerebellum. The ventricles are normal in size and position. Basilar cisterns are patent. No midline shift. There is no evidence of acute hemorrhage. The paranasal sinuses, mastoid air cells, and middle ears are clear. The orbital contents are within normal limits. There are no significant osseous or extracranial soft tissue lesions. 1. Age-indeterminate infarct in the right inferior cerebellum, which may be acute to subacute. Recommend MRI for further evaluation. 2. Additional small chronic infarcts in the right arden and right cerebellum. 3. No evidence of acute hemorrhage. XR CHEST PORT    Result Date: 2/6/2023  INDICATION:  missed dialysis EXAM: Single portable view of chest 1323 . Comparison:8/28/2022 Findings: Cardiac silhouette is enlarged. Pulmonary vasculature is not engorged. There are no focal parenchymal opacities, effusions, or pneumothorax. There is a tunneled left subclavian central line. Vascular stent overlies the proximal left forearm.      1. Enlarged cardiac silhouette, no evidence of pulmonary jared Carreno MD

## 2023-02-10 NOTE — PROGRESS NOTES
Medicine Consult    Patient:  [unfilled] @AGE@ @SEX@  Asked to evaluate patient by ***  Primary Care Provider:  @PCP@  Date of Admission:  [unfilled]  Reason for Consult:         Assessment/Plan     [unfilled]    PLAN:  1. DM type II   -no hypoglycemia at home. Will give lantus 38 units bedtime. premeal 5 units, ssi, hypoglycemia protocol , diabetic diet   2. HTN   Well controlled at home, will continue home medication, check renal function and electrolytes  ***  -Monitor hemodynamics and hemoglobin in and postoperative. Monitor for  postoperative anemia. -Monitor kidney function. Avoid nephrotoxins. Gentle hydration.  -Continue blood pressure medications. Monitor for hypotension. If that is  to present, we may need to hold some or all of her blood pressure  medications.  -Continue lipid lowering therapy.  -Routine postoperative management, pain control. -DVT prophylaxis per primary team  Incentive spirometry  PT/OT  Supportive care  Thank you for allowing us to participate in  Pt's care and will follow   HPI:   CC:***  [unfilled] is a @AGE@ @SEX@ with past medical history significant for . Keron Lowry ...was admitted to . ..[facility] . .... [facility] medicine is consulted for . ...    @PMH@  @PSH@   [unfilled]  [unfilled]  @MED@   [unfilled]        Review of Systems  Constitutional: No fever, chills, diaphoresis, malaise, fatigue or weight gain/loss or falls  Skin: no itching or rashes  HEENT: no ear discomfort, hearing loss, tinnitus, epistaxis or sore throat  EYES: no blurry vision, double vision or photophobia  CARDIOVASCULAR: no claudication, cp, palpitations, orthopnea, pnd or LE edema  PULMONARY: no cough, wheeze, shortness of breath or sputum production  GI: no nausea, vomiting, diarrhea, abdominal pain, melena, hematemesis or brbpr  : no dysuria, hematuria  MUSCULOSKELETAL: no back pain, joint pain or myalgias  ENDOCRINE: no heat/cold intolerance, polyuria or polydipsia  HEME: no easy bruising or bleeding  NEURO: no unilateral weakness, numbness, tingling or seizures      Physical Exam:      [unfilled]  @BMI@    Physical Exam:  GEN: well nourished, laying in bed in no acute distress  HEENT: atraumatic, nose normal,oropharynx clear, MMM  NECK: supple, trachea midline, no supraclavicular or submandibular adenopathy noted  EYES: conjuctiva normal, lids with out lesions, PERRL  HEART: RRR with out m/r/g, pmi nondisplaced, pulses 2+ distally  LUNGS: equal chest wall expansion, cta bl with out wheezes/rales or rhonchi  AB: soft, +BS, nt/nd no organomegaly  NEURO: alert, awake and oriented x3, gait not assessed, cranial nerves intact, strength 5/5 bl UE and LE, sensation intact, reflexes nonpathological  SKIN: dry, intact, warm no breakdown noted        Laboratory Studies:      Labs: Results:       Chemistry @LABRCNT(Glu:3,Na:3,K:3,Cl:3,CO2:3,BUN:3,Crea:3,Ca:3,AGAP:3,BUCR:3,TBIL:3,GPT:3,AP:3,TP:3,ALB:3,GLOB:3,AGRAT:3)@   CBC w/Diff @LABRCNT(WBC:3,RBC:3,HGB:3,HCT:3,PLT:3,GRANS:3,LYMPH:3,EOS:3)@   Cardiac Enzymes @LABRCNT(CPK:2,CKRMB:2,CKND1:2,TROIP:2,WILLIE:2)@   Coagulation @LABRCNT(PTP:2,INR:2,APTT:2)@    Lipid Panel @BRIEFLAB(CHOL,CHOLPOCT,[id number],CHOLX,CHOLP,CHLST,CHOLV,806781,HDL,HDLPOCT,726856,NHDLCT,[id number],HDLC,HDLP,LDL,LDLPOCT,812086,NLDLCT,DLDL,LDLC,DLDLP,724896,VLDLC,VLDL,TGL,TGLX,TRIGL,[id number],TRIGP,TGLPOCT,800522,715330,CHHD,CHHDX)@   BNP @LABRCNT(BNPP)@   Liver Enzymes @LABRCNT(TP,ALB,TBIL,AP,SGOT,GPT,DBIL)@   Thyroid Studies @BRIEFLAB(T4,T3U,T3RU,TSH)@         Imaging studies personally reviewed:  Significant Diagnostic Studies: [unfilled]          Binta Ledezma MD, Internal Medicine         Discharge Summary    Patient: [de-identified] MRN: @MRN@  CSN: @CSN@    Date of Birth: [de-identified]  Age: [de-identified]  Sex: [de-identified]    DOA: [unfilled] LOS: [unfilled]  Discharge Date: [unfilled]     Primary Care Provider:  @PCP@    Admission Diagnoses: [unfilled]    Discharge Diagnoses:  [unfilled]    Discharge Condition: stable     Discharge Medications: [unfilled]    Procedures : ***    Consults: {consults:[id number]}      PHYSICAL EXAM ***  [unfilled]  General: Awake, cooperative, no acute distress    HEENT: NC, Atraumatic. PERRLA, EOMI. Anicteric sclerae. Lungs:  CTA Bilaterally. No Wheezing/Rhonchi/Rales. Heart:  Regular  rhythm,  No murmur, No Rubs, No Gallops  Abdomen: Soft, Non distended, Non tender. +Bowel sounds,   Extremities: No c/c/e  Psych:   Not anxious or agitated. Neurologic:  No acute neurological deficits. Admission HPI : [facility] Course : ***    Discharge planning discussed with patient, pt agrees  with the plan and no questions and concerns at this point.        Activity: {discharge activity:51359}    Diet: {diet:85713}    Follow-up: PCP ***    Disposition: home /rehab /snf     Minutes spent on discharge: 45 min       Labs: Results:       Chemistry @LABRCNT(Glu:3,Na:3,K:3,Cl:3,CO2:3,BUN:3,Crea:3,Ca:3,AGAP:3,BUCR:3,TBIL:3,GPT:3,AP:3,TP:3,ALB:3,GLOB:3,AGRAT:3)@   CBC w/Diff @LABRCNT(WBC:3,RBC:3,HGB:3,HCT:3,PLT:3,GRANS:3,LYMPH:3,EOS:3)@   Cardiac Enzymes @LABRCNT(CPK:2,CKRMB:2,CKND1:2,TROIP:2,WILLIE:2)@   Coagulation @LABRCNT(PTP:2,INR:2,APTT:2)@    Lipid Panel @BRIEFLAB(CHOL,CHOLPOCT,425635,583274,[id number],CHOLX,CHOLP,CHLST,CHOLV,413846,HDL,HDLPOCT,412559,NHDLCT,[id number],HDLC,HDLP,LDL,LDLPOCT,205702,NLDLCT,DLDL,LDLC,DLDLP,508657,VLDLC,VLDL,TGL,TGLX,TRIGL,[id number],TRIGP,TGLPOCT,087183,610705,CHHD,CHHDX)@   BNP @LABRCNT(BNPP)@   Liver Enzymes @LABRCNT(TP,ALB,TBIL,AP,SGOT,GPT,DBIL)@   Thyroid Studies @BRIEFLAB(T4,T3U,T3RU,TSH)@       @micro    Significant Diagnostic Studies: [unfilled]          [facility]     CC: @PCP@        History & Physical    Patient: [de-identified] MRN: @MRN@  CSN: @CSN@    Date of Birth: @@  Age: [de-identified]  Sex: [de-identified]      DOA: Dion  Primary Care Provider:  @PCP@      Assessment/Plan     [unfilled]    ***  Admit to       Sepsis   Due to foot infection  Septic protocol, pan cx , iv fluid ,lactic acid monitoring  Iv abx started in Er and will continue, received iv n bolus         DM type II , with complication,  -long term insulin , Complication with cva  -on lantus, premeal insulin, ssi, diabetic diet , hypoglycemia protocol     HTN, accelerated  Continue home medication. AC:  I have had a discussion with patient and family regarding code status. Particularly, described potential options in event of cardiac or respiratory arrest. I have explained what being full code entails, including cardiorespiratory resuscitation attempts with chest compressions, potential cariodioversion/ \" shocks\" as well as intubation. I have also explained Do not resuscitate which would mean we allow a natural death with out aggressive interventions. AC 32 min     Please note that this dictation was completed with Int[employer], the computer voice recognition software. Quite often unanticipated grammatical, syntax, homophones, and other interpretive errors are inadvertently transcribed by the computer software. Please disregard these errors. Please excuse any errors that have escaped final proofreading    Estimate  length of stay : 2-3 day/TBD ***    DVT : heparin ppi proph  CC: ***       HPI:     [unfilled] is a @AGE@ @SEX@ with    Denies any slurred speech/headache/cp/n/v/blurred vission/d/c/palpitation/gait change/bleeding. Denies smoking/ any alcohol or drug use. Admission and treatment discussed with patient and family, all agree and indicated a verbal understanding   @VS(8)@ @FLOW(1163)@ @FLOW(1028)@      @PMH@    @PSH@    [unfilled]    [unfilled]    [unfilled]    [unfilled]    Review of Systems  Gen: No fever, chills, malaise, weight loss/gain. Heent: No headache, rhinorrhea, epistaxis, ear pain, hearing loss, sinus pain, neck pain/stiffness, sore throat. Heart: No chest pain, palpitations, FU, pnd, or orthopnea. Resp: No cough, hemoptysis, wheezing and shortness of breath.    GI: No nausea, vomiting, diarrhea, constipation, melena or hematochezia. : No urinary obstruction, dysuria or hematuria. Derm: No rash, new skin lesion or pruritis. Musc/skeletal: no bone or joint complains. Vasc: No edema, cyanosis or claudication. Endo: No heat/cold intolerance, no polyuria,polydipsia or polyphagia. Neuro: No unilateral weakness, numbness, tingling. No seizures. Heme: No easy bruising or bleeding. Physical Exam:     Physical Exam:  @VS(8)@ @LVRD(8813)@ @FLOW(1028)@    @KSFZ(43)@  [unfilled]   [unfilled]    General:  Awake, cooperative, no distress. Head:  Normocephalic, without obvious abnormality, atraumatic. Eyes:  Conjunctivae/corneas clear, sclera anicteric, PERRL, EOMs intact. Nose: Nares normal. No drainage or sinus tenderness. Throat: Lips, mucosa, and tongue normal. .   Neck: Supple, symmetrical, trachea midline, no adenopathy. Lungs:   Clear to auscultation bilaterally. Heart:  Regular rate and rhythm, S1, S2 normal, no murmur, click, rub or gallop. Abdomen: Soft, non-tender. Bowel sounds normal. No masses,  No organomegaly. Extremities: Extremities normal, atraumatic, no cyanosis or edema. Pulses: 2+ and symmetric all extremities. Skin: Skin color-pink, texture, turgor normal. No rashes or lesions. Capillary refill normal    Neurologic: CNII-XII intact. No focal motor or sensory deficit.        Labs Reviewed:    {LABS REVIEWED:[id number]}    [unfilled]  Procedures/imaging: see electronic medical records for all procedures/Xrays and details which were not copied into this note but were reviewed prior to creation of Celina Hernandez MD, Internal Medicine     CC: @PCP@        Hospitalist Progress Note-critical care note     Patient: [unfilled] MRN: @MRN@  CSN: @CSN@    Date of Birth: @@  Age: [de-identified]  Sex: [de-identified]    DOA: [unfilled] LOS: [unfilled]           Chief complaint: ****    Assessment/Plan         [id number]       Disposition :tbd,   Review of systems:    General: No fevers or chills. Cardiovascular: No chest pain or pressure. No palpitations. Pulmonary: No shortness of breath. Gastrointestinal: No nausea, vomiting. Vital signs/Intake and Output:  [unfilled]  Current Shift:  [unfilled]  Last three shifts:  [unfilled]    Physical Exam:  General: WD, WN. Alert, cooperative, no acute distress    HEENT: NC, Atraumatic. PERRLA, anicteric sclerae. Lungs: CTA Bilaterally. No Wheezing/Rhonchi/Rales. Heart:  Regular  rhythm,  No murmur, No Rubs, No Gallops  Abdomen: Soft, Non distended, Non tender. +Bowel sounds,   Extremities: No c/c/e  Psych:   Not anxious or agitated. Neurologic:  No acute neurological deficit. Labs: Results:       Chemistry @LABRCNT(Glu:3,Na:3,K:3,Cl:3,CO2:3,BUN:3,Crea:3,Ca:3,AGAP:3,BUCR:3,TBIL:3,GPT:3,AP:3,TP:3,ALB:3,GLOB:3,AGRAT:3)@   CBC w/Diff @LABRCNT(WBC:3,RBC:3,HGB:3,HCT:3,PLT:3,GRANS:3,LYMPH:3,EOS:3)@   Cardiac Enzymes @LABRCNT(CPK:2,CKRMB:2,CKND1:2,TROIP:2,WILLIE:2)@   Coagulation @LABRCNT(PTP:2,INR:2,APTT:2)@    Lipid Panel @BRIEFLAB(CHOL,CHOLPOCT,119765,927865,[id number],CHOLX,CHOLP,CHLST,CHOLV,556086,HDL,HDLPOCT,445730,NHDLCT,MVH109823,HDLC,HDLP,LDL,LDLPOCT,683829,NLDLCT,DLDL,LDLC,DLDLP,605951,VLDLC,VLDL,TGL,TGLX,TRIGL,GSK881014,TRIGP,TGLPOCT,676272,172868,CHHD,CHHDX)@   BNP @LABRCNT(BNPP)@   Liver Enzymes @LABNT(TP,ALB,TBIL,AP,SGOT,GPT,DBIL)@   Thyroid Studies @BRIEFLAB(T4,T3U,T3RU,TSH)@     Procedures/imaging: see electronic medical records for all procedures/Xrays and details which were not copied into this note but were reviewed prior to creation of Plan    [unfilled]    Hilary Rubalcava MD          CRITICAL CARE PLAN     Resp      HOB>30 degrees. Trach care. Bronchodilators. Solumedrol scheduled. Follow respiratory cultures. F/u with Intensivist        CVS     Monitor HD. IV hydration; caution with hx of CHF. levophed for MAP>65.    Elevated troponin; f/u cardiac enzymes, get 2D echo and f/u with cardiology     ID      Follow up blood and urine cx. ANTIBIOTICS . Zosyn IV  Trend temps, wbc. Heme/onc   Follow H&H, plts. INR. Renal   Trend BUN, Cr, follow I/O, barrett in place. Check and replace Mg, K, phos. icu electrolytes replacement protocol     Neuro: ***     Endocrine - Follow FSG, get TSH     Neuro/ Pain/ Sedation - none. GI - NPO for now. Prophylaxis - DVT: SCDs, GI: protonix    45 minutes of critical care time spent in the direct evaluation and treatment of this high risk patient. The reason for providing this level of medical care for this critically ill patient was due a critical illness that impaired one or more vital organ systems such that there was a high probability of imminent or life threatening deterioration in the patients condition. This care involved high complexity decision making to assess, manipulate, and support vital system functions, to treat this degreee vital organ system failure and to prevent further life threatening deterioration of the patients condition. Death note :      Paged to bedside TO ACCESS THE PATIENT. PT DID NOT RESPONSE TO  VERBAL OR PAIN STIMULI. NO BREATHING SOUND HEARD OVER 1 MIN. NO CORNEAL REFLUX ADD PUPIL DILATED. TIME OF DEATH :        FAMILY WAS INFORMED. AUTOPSY WAS DECLINED.

## 2023-02-10 NOTE — PROGRESS NOTES
D/C Plan: Home with [facility] when medically ready, will need Medicaid stretcher transport home. and [facility]     During rounds pharmacy stated patiet was  currently on day 4 of a 5 day course of IV ABX. Reina Talbert CM notes patient had graft excision and possible tunnel catheter dialysis placement-left arm with c-arm procedure planned today but it was cancelled after patient arrival. CM to continue to follow. Care Management Interventions  PCP Verified by CM: Yes (Dr. Navi Clemens)  Palliative Care Criteria Met (RRAT>21 & CHF Dx)?: Yes  Palliative Consult Recommended?: Yes  Mode of Transport at Discharge:  Other (see comment) (Medicaid transport. )  Transition of Care Consult (CM Consult): Home Health (Pt is in agreement w/ HH at d/c. )  [facility] [place] - INPATIENT: Yes  Discharge Durable Medical Equipment: No  Physical Therapy Consult: Yes  Occupational Therapy Consult: Yes  Speech Therapy Consult: No  Support Systems: Other Family Member(s) (Sister)  Confirm Follow Up Transport: Other (see comment) (Medicaid transport. )  The Plan for Transition of Care is Related to the Following Treatment Goals : [facility]  The Patient and/or Patient Representative was Provided with a Choice of Provider and Agrees with the Discharge Plan?: Yes (The pt is in agreement w/ the d/c plan. )  Freedom of Choice List was Provided with Basic Dialogue that Supports the Patient's Individualized Plan of Care/Goals, Treatment Preferences and Shares the Quality Data Associated with the Providers?: Yes ([employer])  Discharge Location  Patient Expects to be Discharged to[de-identified] Home with home health No

## 2023-02-10 NOTE — PROGRESS NOTES
Patient to DC today. Patient has [facility]. Referral and orders sent . UAI to be sent to :    [email or web address]     Per patients request. RN to get cab for patient to get to dialysis center to get car today Patient has car keys and rollator for DC. Upon dialysis completion, patient to DC.

## 2023-02-10 NOTE — PROGRESS NOTES
TREATMENT SUMMARY       Patient in room 357 dialyzed for 3 hours. Left Subclavian CVC Patent and functioning well. Tolerated tx well with without complaint or complications. LIJ CVC functioning without complication accessing or BFR   mL/min   mL/min  3000 ml UF removed with a net UF removal of 2500 ml. Report given to Randell Zhou RN with all questions answered. TREATMENT NOTES                                                                                                ACUTE HEMODIALYSIS FLOW SHEET       PATIENT INFORMATION   [address] [facility]       DR. Alston    []1st Time Acute  []Stat[x] Routine []Urgent []Chronic Unit   []Acute Room [x]Bedside  []ICU/CCU []ER   Isolation Precautions: [x]Dialysis[] Airborne [x]Contact []Droplet []Reverse    Special Considerations:_______  [] Blood Consent Verified  [x]N/A   Allergies:[] NKA                 [x] __  Penicillins Penicillins Rash Not Specified  12/22/2020   Deletion Reason:    Vancomycin Vancomycin Other (comments) Not Specified  1/29/2020   ___________ Code Status [x]Full Code [] DNR  [] Other_____   Diet: [x] Renal [] NPO [] Diabetic   [] Enteral Feeding [] Cardiac Diabetic: []Yes [x]No     [x]Signed Treatment Consent Verified   [x] Time Out/ Safety Check   PRIMARY NURSE REPORT: FIRST INITIAL/ LAST NAME/TITLE  PRE DIALYSIS:       Randell Zhou RN                                    TIME: 1042   ACCESS   CATHETER ACCESS: [] N/A  [] RIGHT  [x] LEFT  [] IJ  [] SUBCL [] FEM                    [] First use X-ray  [] Tunnel     [] Non-Tunneled      [] No S/S infection  [] Redness [] Drainage  [] Cultured [] Swelling [] Pain                    [] Medical Aseptic [] Prep Dressing Changed                  [] Clotted [] Patent []      Flows: [] Good [] Poor [] Reversed                 If Access Problem Dr. Navneet Vivar: [] Yes [] No    Date:_____  [] N/A[]   GRAFT/FISTULA ACCESS:  [x] N/A  [] RIGHT  [] LEFT  [] UE   [] LE [] AVG  [] AVF [] BUTTONHOLE    [] +BRUIT/THRILL [] MEDICAL ASEPTIC PREP     [] No S/S infection  [] Redness [] Drainage  [] Cultured [] Swelling [] Pain              If Access Problem Dr. Jatin Chapman: [] Yes [] No    Date:______ [] N/A   GENERAL ASSESSMENT   LUNGS:  SaO2% ____ [x] Clear [] Coarse [] Crackles [] Wheezing               [] Diminished Location: [] RLL [] LLL [] RUL [] KYLAH    COUGH:  [] Productive  [] Loose[] Dry [x] N/A  RESPIRATIONS: [x] Easy [] Labored    THERAPY: [x] RA   [] NC _____. L/min    Mask: [] NRB [] Venti  _____O2%                  [] Ventilator [] Intubated [] Trach [] BiPap [] CPap [] HI Flow   CARDIAC: [x] Regular [] Irregular [] Pericardial Rub [] JVD               Monitored Rhythm:______ [] N/A   EDEMA: [x] None [] Generalized [] Facial [] Pedal [] UE [] LE             [] Pitting [] 1 [] 2 [] 3 [] 4    [] Right [] Left [] Bilateral   SKIN:    [x] Warm [] Hot [] Cold  [x] Dry [] Pale [] Diaphoretic              [] Flushed [] Jaundiced [] Cyanotic [] Rash [] Weeping     LOC:    [x] Alert  Oriented to: [x] Person [x] Place [x] Time             [] Confused [] Lethargic [] Medicated [] Non-responsive    GI/ABDOMEN: [] Flat [x] Distended [] Soft [] Firm [] Diarrhea [] Bowel Sounds Present [] Nausea [] Vomiting    PAIN: [x] 0 [] 1 [] 2 [] 3 [] 4 [] 5 [] 6 [] 7 [] 8 [] 9 [] 10          Scale 1-10 Action/Follow Up_____   MOBILITY: [] Amb [] Amb/Assist [x] Bed  [] Wheelchair    CURRENT LABS   HBsAg ONLY: Date Drawn:  06/20/22           [x] Negative [] Positive [] Unknown.      HBsAb: Date Drawn:    06/20/22          [x] Susceptible <10 [] Immune ?10 [] Unknown   Date of Current Labs:  ATTACHED     EDUCATION   Person Educated: [x] Patient [] Other_________   Knowledge base: [] None [x] Minimal [] Substantial    Barriers to learning  [x] None _______________   Preferred method of learning: [] Written [] Oral [x] Visual [] Hands on    Topic: [x] Access Care [x] S&S of infection [x] Fluid Management [x] K+  [x] Procedural  [] Albumin [] Medications [] Tx Options   [] Transplant [x] Diet [] Other    Teaching Tools: [x] Explain [] Demonstration [] Handout_____ [] Video______  CARE PLAN    [x] Renal Failure (Adult)  Interdisciplinary  Fluid and electrolytes stabilized  Interventions  Dehydration signs and symptoms (eg: Weight/lab monitoring; vomiting/diarrhea/urine; tenting; mucous membranes; dizziness/lethargy/irritability/confusion; weak pulse; tachycardia; blood pressure; I&O)  Fluid overload signs and symptoms assessment (eg: Body weight increased; dyspnea; edema; hypertension; respiratory crackles/wheezing; JVD; lab monitoring; mental status changes; I&O)  Monitor appropriate lab values  COMPLIANCE WITH PRESCRIBED THERAPY  ARTERIAL ACCESS SITE ASSESSMENT  NUTRITION SCREENING  Vital signs monitoring per assessed patient condition or unit standard  Cardiac monitoring  Hydration management  Intake and output measurement  Body weight monitoring  Skin care  DIALYSIS  Nutrition Care Process per nutrition screen  Oral hygiene care every 2 hours  Pain management     Outcome   [x] Progressing Towards Goal  [] Not Progressing Towards Goals  [] Goals Met/Resolved  [] Goals Not Met/ Resolved        Patient/ Family Education  Progressing Towards Goals          RO/HEMODIAYLSIS MACHINE SAFETY CHECKS- BEFORE EACH TREATMENT          [x] [facility]: Machine Serial #1:  [id number]   RO Serial #1: [id number]                       Alarm Test: [x] Pass  Time__1702______  [x] RO/Machine Log Complete    [x] Extracorporeal circuit Tested for integrity           Dialyzer_[id number]_________   Tubing___22G04-11_________    Dialysate: pH_7.2__  Temp.__36__Conductivity: Meter ____ HD Machine_14.2__   CHLORINE TESTING- BEFORE EACH TREATMENT AND EVERY 4 HOURS   Total Chlorine: [] Less than 0.1 ppm Time:_____2nd Check Time:______  (If greater than 0.1 ppm from Primary then every 30 minutes from Secondary)   TREATMENT INIATION-WITH DIALYSIS PRECAUTIONS   [x] All Connections Secured   [x] Saline Line Double Clamped    [x] Venous Parameters Set [x] Arterial Parameters Set    [x] Prime Given 250 ml     [x] Air Foam Detector Engaged   PRE-TREATMENT   UF Calculations: Wt to lose: 2500____ml(+) Oral:__ml(+)IV Meds/Fluids/Blood prods___ml(+) Prime/Rinse_500__ml(=)Total UF Goal_3000___mL   Scale Type:[x] Bed scale [] Sling Scale [] Wheel Chair Scale []  Not Ordered [] [] Unable to obtain pt on stretcher/ bed scale malfunctioning   Tx Initiation Note:  Tx Initiation Note: Received patient in bed semi fowlers position. Left IJ CVC remains intact. No s/s of infection noted. Dressing to same in place. Treatment initiated as per MD order without incident. Plan to remove 2.5 liters for 3 hours while monitoring patient's well-being and vital signs. [x] Time Out/Safety Check  Time:_1713____   INTRADIALYTIC MONITORING  (SEE ATTACHED FLOWSHEET)     POST TREATMENT    Time Medication Dose Volume Route    Initials                                           [facility] Signatures Title Initials Time   Isma Mcarthur RN GM 2045               Dialyzer cleared: [] Good [x] Fair [] Poor     Blood Processed _68.6__Liters    Net UF Removed _2500___mL  Post Tx Access:                  AVF/AVG: Bleeding Stop       Art.___min Chaim.____min []+bruit/thrill                Catheter: Locking Solution [x] Heparin 1 ml/1000 units  [] Normal Saline                                                                               Art.__2.3___ ml Chaim._2.3____ml  Post Assessment:              Skin: [x]Warm []Dry []Diaphoretic []Flushed [] Pale []Cyanotic            Lungs: [x]Clear []Coarse []Crackles []Wheezing             Cardiac: [x]Regular []Irregular  []Monitored rhythm____ []N/A            Edema: [x]None []General []Facial []Pedal  []UE []LE []RIGHT []LEFT            Pain: [x]0 []1 []2 []3 []4 []5 []6 []7 []8 []9 []10   POST Tx Note:  HD treatment completed and tolerated well.  Patient rinsed with 250 mL 0.9% N/S. 2.3 mL instilled in arterial lumen, 2.3 mL instilled in venous lumen of Left IJ CVC. Caps applied to lumen ends securely. Patient remains in  bed 357 in stable condition. O2 Sat 100 %. No acute complaints or distress noted.               Primary Nurse Report: First initial/Last name/Title    Post Dialysis:__Shahrzad Babb RN________________________         Time:______2045__________    Abbreviations: AVG-arterial venous graft, AVF-arterial venous fistula, IJ-Internal Jugular,  Subcl-Subclavian, Fem-Femoral, Tx-treatment, AP/HR-apical heart rate, DFR-dialysate flow rate, BFR-blood flow rate, AP-arterial pressure, -venous pressure, UF-ultrafiltrate, TMP-transmembrane pressure, Chaim-Venous, Art-Arterial, RO-Reverse Osmosis

## 2023-02-10 NOTE — PROGRESS NOTES
Problem: Falls - Risk of  Goal: *Absence of Falls  Description: Document Jeffry Jourdan Fall Risk and appropriate interventions in the flowsheet. Outcome: Progressing Towards Goal  Note: Fall Risk Interventions:  Mobility Interventions: Bed/chair exit alarm         Medication Interventions: Patient to call before getting OOB    Elimination Interventions: Call light in reach              Problem: Risk for Spread of Infection  Goal: Prevent transmission of infectious organism to others  Description: Prevent the transmission of infectious organisms to other patients, staff members, and visitors. Outcome: Progressing Towards Goal     Problem: Patient Education:  Go to Education Activity  Goal: Patient/Family Education  Outcome: Progressing Towards Goal     Problem: General Medical Care Plan  Goal: *Vital signs within specified parameters  Outcome: Progressing Towards Goal  Goal: *Labs within defined limits  Outcome: Progressing Towards Goal     Problem: Pressure Injury - Risk of  Goal: *Prevention of pressure injury  Description: Document Tucker Scale and appropriate interventions in the flowsheet. Outcome: Progressing Towards Goal  Note: Pressure Injury Interventions:  Sensory Interventions: Assess changes in LOC, Minimize linen layers    Moisture Interventions: Absorbent underpads, Minimize layers    Activity Interventions: PT/OT evaluation    Mobility Interventions: Turn and reposition approx.  every two hours(pillow and wedges), PT/OT evaluation    Nutrition Interventions: Document food/fluid/supplement intake, Offer support with meals,snacks and hydration    Friction and Shear Interventions: Minimize layers

## 2023-02-11 VITALS
HEIGHT: 67 IN | DIASTOLIC BLOOD PRESSURE: 90 MMHG | BODY MASS INDEX: 26.82 KG/M2 | WEIGHT: 170.86 LBS | HEART RATE: 85 BPM | SYSTOLIC BLOOD PRESSURE: 167 MMHG | RESPIRATION RATE: 20 BRPM | OXYGEN SATURATION: 97 % | TEMPERATURE: 97.7 F

## 2023-02-11 VITALS
HEART RATE: 82 BPM | TEMPERATURE: 98.1 F | SYSTOLIC BLOOD PRESSURE: 192 MMHG | RESPIRATION RATE: 18 BRPM | OXYGEN SATURATION: 100 % | DIASTOLIC BLOOD PRESSURE: 91 MMHG

## 2023-02-11 PROBLEM — N18.6 ESRD (END STAGE RENAL DISEASE) (HCC): Status: ACTIVE | Noted: 2023-02-06

## 2023-02-11 PROBLEM — I10 HYPERTENSION: Status: ACTIVE | Noted: 2022-06-01

## 2023-02-11 PROBLEM — Z99.2 ESRD (END STAGE RENAL DISEASE) ON DIALYSIS (HCC): Status: ACTIVE | Noted: 2020-01-29

## 2023-02-11 PROBLEM — I10 HTN (HYPERTENSION): Status: ACTIVE | Noted: 2020-01-29

## 2023-02-11 PROBLEM — N18.6 ESRD (END STAGE RENAL DISEASE) ON DIALYSIS (HCC): Status: ACTIVE | Noted: 2020-01-29

## 2023-02-11 PROBLEM — Z89.511 HX OF BKA, RIGHT (HCC): Status: ACTIVE | Noted: 2020-01-29

## 2023-02-11 LAB
ANION GAP SERPL CALC-SCNC: 5 MMOL/L (ref 3–18)
BASOPHILS # BLD: 0 K/UL (ref 0–0.1)
BASOPHILS NFR BLD: 0 % (ref 0–2)
BUN SERPL-MCNC: 24 MG/DL (ref 7–18)
BUN/CREAT SERPL: 5 (ref 12–20)
CALCIUM SERPL-MCNC: 8.5 MG/DL (ref 8.5–10.1)
CHLORIDE SERPL-SCNC: 107 MMOL/L (ref 100–111)
CO2 SERPL-SCNC: 24 MMOL/L (ref 21–32)
CREAT SERPL-MCNC: 5.25 MG/DL (ref 0.6–1.3)
DIFFERENTIAL METHOD BLD: ABNORMAL
EOSINOPHIL # BLD: 0.3 K/UL (ref 0–0.4)
EOSINOPHIL NFR BLD: 7 % (ref 0–5)
ERYTHROCYTE [DISTWIDTH] IN BLOOD BY AUTOMATED COUNT: 24 % (ref 11.6–14.5)
GLUCOSE BLD STRIP.AUTO-MCNC: 100 MG/DL (ref 70–110)
GLUCOSE BLD STRIP.AUTO-MCNC: 90 MG/DL (ref 70–110)
GLUCOSE SERPL-MCNC: 80 MG/DL (ref 74–99)
HCT VFR BLD AUTO: 31.2 % (ref 36–48)
HGB BLD-MCNC: 10.1 G/DL (ref 13–16)
IMM GRANULOCYTES # BLD AUTO: 0 K/UL (ref 0–0.04)
IMM GRANULOCYTES NFR BLD AUTO: 0 % (ref 0–0.5)
LYMPHOCYTES # BLD: 0.7 K/UL (ref 0.9–3.6)
LYMPHOCYTES NFR BLD: 15 % (ref 21–52)
MCH RBC QN AUTO: 25.8 PG (ref 24–34)
MCHC RBC AUTO-ENTMCNC: 32.4 G/DL (ref 31–37)
MCV RBC AUTO: 79.6 FL (ref 78–100)
MONOCYTES # BLD: 0.6 K/UL (ref 0.05–1.2)
MONOCYTES NFR BLD: 13 % (ref 3–10)
NEUTS SEG # BLD: 3 K/UL (ref 1.8–8)
NEUTS SEG NFR BLD: 65 % (ref 40–73)
NRBC # BLD: 0 K/UL (ref 0–0.01)
NRBC BLD-RTO: 0 PER 100 WBC
PLATELET # BLD AUTO: 126 K/UL (ref 135–420)
POTASSIUM SERPL-SCNC: 4.3 MMOL/L (ref 3.5–5.5)
RBC # BLD AUTO: 3.92 M/UL (ref 4.35–5.65)
SODIUM SERPL-SCNC: 136 MMOL/L (ref 136–145)
WBC # BLD AUTO: 4.7 K/UL (ref 4.6–13.2)

## 2023-02-11 PROCEDURE — 6370000000 HC RX 637 (ALT 250 FOR IP): Performed by: HOSPITALIST

## 2023-02-11 PROCEDURE — 36415 COLL VENOUS BLD VENIPUNCTURE: CPT

## 2023-02-11 PROCEDURE — 85025 COMPLETE CBC W/AUTO DIFF WBC: CPT

## 2023-02-11 PROCEDURE — 82962 GLUCOSE BLOOD TEST: CPT

## 2023-02-11 PROCEDURE — 80048 BASIC METABOLIC PNL TOTAL CA: CPT

## 2023-02-11 RX ADMIN — SEVELAMER CARBONATE 800 MG: 800 TABLET, FILM COATED ORAL at 10:15

## 2023-02-11 RX ADMIN — SEVELAMER CARBONATE 800 MG: 800 TABLET, FILM COATED ORAL at 13:07

## 2023-02-11 RX ADMIN — LOSARTAN POTASSIUM 100 MG: 50 TABLET, FILM COATED ORAL at 10:15

## 2023-02-11 RX ADMIN — CARVEDILOL 3.12 MG: 3.12 TABLET, FILM COATED ORAL at 10:15

## 2023-02-11 RX ADMIN — PANTOPRAZOLE SODIUM 40 MG: 40 TABLET, DELAYED RELEASE ORAL at 10:14

## 2023-02-11 RX ADMIN — AMLODIPINE BESYLATE 10 MG: 5 TABLET ORAL at 10:15

## 2023-02-11 NOTE — PROGRESS NOTES
Progress  Notes by Abhijit Vera MD at 02/10/23 1458             Progress Notes by Abhijit Vera MD at 02/10/23 1458 (continued)                Author: Abhijit Vera MD  Service: Nephrology  Author Type: Physician       Filed: 02/10/23 1500  Date of Service: 02/10/23 1458  Status: Signed          : Abhijit Vera MD (Physician)                 RENAL CONSULT   2/10/2023      Patient:  Toya Kaplan   :  1970   Gender:  male   MRN #:  [id number]      Reason for Consult: ESRD related care and high blood pressure       Subjective:    Says he feels fine  Denied chest pain and shortness of breath   BP elevated   No other symptoms       Objective:      Visit Vitals       BP  (!) 187/89 (BP 1 Location: Left upper arm, BP Patient Position: Lying)  Comment: Retake        Pulse  89     Temp  98.6 °F (37 °C)     Resp  18     Ht  5' 7\" (1.702 m)     Wt  75 kg (165 lb 5.5 oz)     SpO2  100%        BMI  25.90 kg/m²           Physical Exam:      Pt awake,  alert and comfortable   Lung: clear to auscultation   Ext: no edema in LLE, RLE- BKA       Laboratory Data:        Lab Results         Component  Value  Date            BUN  38 (H)  02/10/2023       BUN  28 (H)  2023       BUN  47 (H)  2023       NA  138  02/10/2023       NA  140  2023       NA  140  2023       CO2  22  02/10/2023       CO2  26  2023            CO2  22  2023          Lab Results         Component  Value  Date            WBC  4.5 (L)  02/10/2023       HGB  9.0 (L)  02/10/2023            HCT  28.2 (L)  02/10/2023        No components found for: CALCIUM, PHOSPHORUS, MAGNESIUM     Lab Results         Component  Value  Date            HDL  62 (H)  2022        No results found for: SPECIMENTYP, TURBIDITY, UGLU      Imaging Reveiwed:      CXR:-     IMPRESSION   1.  Enlarged cardiac silhouette, no evidence of pulmonary edema      Assessment:Evan Peck is a 50y.o. year old maleh/o ESRD on HD , non compliant with dialysis  admitted for malfunctioning dialysis catheter    Does not have volume overload and hyperkalemia   Has uncontrolled hypertension           Hypertension    ESRD   Anemia of CKD    Hyperphosphatemia    Secondary Hyperparathyroidism        Plan:         Hypertension- BP now above target rang   Continue  amlodipine 10 mg daily,  coreg  3.125 mg BID    Losartan 100 mg daily    Resume doxazosin 2 mg BID   Later on  spironolactone if still high    Hydralazine iv prn as ordered    Should improve post HD           ESRD-    Patient examined and labs reviewed,  No indications of dialysis today   Next HD on Monday   Please remove central line from groin   Informed nurse to remove it         Anemia- GUALBERTO as indicated after 14 days of mircera dose as outpatient        Bone mineral disease   Hyperphosphatemia- sevelamer  800 mg TID     Secondary hyperparathyroidism- hectoral 4 mcg iv today      No barrier to discharge from nephrology  today       Daniel Dudley MD, MD   [facility].- Nephrology

## 2023-02-11 NOTE — PROGRESS NOTES
Intervention: Monitor/Manage Fluid Electrolyte/Acid Base Balance    PROBLEM: HEMODIALYSIS ADULT    INTERVENTION: Hemodynamic stabilization  Maintain BP WNL for this patient while on hemodialysis    INTERVENTION: Fluid Management  Will attempt 1000 ml total fluid removal as tolerated    INTERVENTION: Metabolic / Electrolyte Imbalance Management  3 K potassium, 2.5 Ca calcium bath today with dialysis    GOAL: Signs and symptoms of listed potential problems will be absent or manageable  (reference Hemodialysis ADULT CPG)    OUTCOME: Progressing  HD planned for 3 hours today as per patient's request. Dr Yadira Sanchez made aware.        Problem: Chronic Renal Failure  Goal: *Fluid and electrolytes stabilized  Outcome: Progressing Towards Goal     Problem: Patient Education: Go to Patient Education Activity  Goal: Patient/Family Education  Outcome: Progressing Towards Goal

## 2023-02-11 NOTE — PLAN OF CARE
Problem: Discharge Planning  Goal: Discharge to home or other facility with appropriate resources  2/11/2023 1337 by Alexandra Ken RN  Outcome: Completed  2/11/2023 1336 by Alexandra Ken RN  Outcome: Progressing     Problem: Skin/Tissue Integrity  Goal: Absence of new skin breakdown  Description: 1. Monitor for areas of redness and/or skin breakdown  2. Assess vascular access sites hourly  3. Every 4-6 hours minimum:  Change oxygen saturation probe site  4. Every 4-6 hours:  If on nasal continuous positive airway pressure, respiratory therapy assess nares and determine need for appliance change or resting period.   2/11/2023 1337 by Alexandra Ken RN  Outcome: Completed  2/11/2023 1336 by Alexandra Ken RN  Outcome: Progressing

## 2023-02-11 NOTE — PROGRESS NOTES
Patient DC pending. Left femoral temporary HD catheter remains in place. Covering hospitalist paged and informed, stated Vascular would need to remove line. On call vascular doctor paged. No return call. Patient can not discharge with line in place. Discharged delayed at this time until line can be removed. Patient informed and agreeable to plan of care.

## 2023-02-11 NOTE — H&P
Jessica Matamoros MD   Physician   Specialty:  Internal Medicine   H&P       Signed   Encounter Date:  2/9/2023          Related encounter: Orders Only from 2/9/2023 in [facility] HISTORICAL CONVERSIONS           Signed                         H&P by Reuben Casarez MD at 02/06/23 1804                    H&P by Reuben Casarez MD at 02/06/23 1804 (continued)              Author: Reuben Casarez MD  Service: --  Author Type: Physician       Filed: 02/06/23 1850  Date of Service: 02/06/23 1804  Status: Signed          : Reuben Casarez MD (Physician)                    History & Physical          Patient: Aliya Harris  MRN: [id number]   CSN: [id number]          YOB: 1970   Age: 50 y.o.    Sex: male         DOA: 2/6/2023   Primary Care Provider:  Joya Thurston MD           Assessment/Plan           Hospital Problems   Date Reviewed: 6/24/2022                            Codes  Class  Noted  POA              Admission for fitting and adjustment of dialysis catheter [facility])  ICD-10-CM: Z49.01   ICD-9-CM: V56.1    2/6/2023  Unknown                        ESRD (end stage renal disease) ([facility]  ICD-10-CM: N18.6   ICD-9-CM: 585.6    2/6/2023  Unknown                        History of hypoglycemia  ICD-10-CM: Z86.39   ICD-9-CM: V12.29    Unknown  Unknown                        Hypertensive emergency  ICD-10-CM: I16.1   ICD-9-CM: 401.9    8/26/2022  Yes                        Hypertension  ICD-10-CM: I10   ICD-9-CM: 401.9    6/1/2022  Yes                        ESRD (end stage renal disease) on dialysis [facility])  ICD-10-CM: N18.6, Z99.2   ICD-9-CM: 585.6, V45.11    1/29/2020  Yes                        Hx of BKA, right ([facility]  ICD-10-CM: Z89.511   ICD-9-CM: V49.75    1/29/2020  Yes                        Admit to tele      hypertensive emergency   Recheck blood pressure improving, will give home medication   CT head         Bleeding from HD catheter   Stopped by clamping the catheter    Will change tdc tomorrow per vascular team    Continue h/h monitoring         End-stage renal disease on HD   Nephrology has been consulted, need vascular surgeon to fix HD catheter   Denies any shortness of breath, potassium is okay   We will have HD tomorrow       Anemia -due to esrd , no bleeding reported    Will continue monitoring h/h         Sickle cell trait           History of Hypoglycemia   Put hypoglycemia protocol        Right BKA, fall precaution       Full code    Please note that this dictation was completed with [employer], the computer voice recognition software. Quite often unanticipated grammatical, syntax, homophones, and other interpretive errors are inadvertently transcribed by the computer software. Please  disregard these errors. Please excuse any errors that have escaped final proofreading      Estimate  length of stay : 1-2 days       DVT : scd ppi proph   CC: bleeding from tdc catheter            HPI:        Deepthi Boone is a 50 y.o. male with history of hypertension, hypoglycemia presented to ER due to bleeding from [facility] catheter. He noted some blood on his shirt on Sunday while he was at [employer]. It  stopped by its self. He went to HD clinic today, blood  came  out from the catheter. He was sent to ER immediately. Vascular surgeon has been consulted. Bleeding was stopped with a clamp. Vascular is planning to change catheter tomorrow. He also presented hypertension in ER. He denies any chest pain no shortness of breath. Chest x-ray no evidence of pulmonary edema.       Visit Vitals      BP  (!) 129/111     Pulse  93     Temp  97.6 °F (36.4 °C)     Resp  16     Ht  5' 7\" (1.702 m)     Wt  75 kg (165 lb 5.5 oz)     SpO2  97%        BMI  25.90 kg/m²        O2 Device: None (Room air)                Past Medical History:        Diagnosis  Date           Chronic kidney disease         Diabetes ([address]         Heart failure ([address]         Hemodialysis patient ([address]         Hypertension             Sickle cell trait ([address]                     Past Surgical History:         Procedure  Laterality  Date            HX ORTHOPAEDIC              right BKA 2017            IR DECLOT AV GRAFT PERCUTANEOUS    6/3/2022       IR INSERT NON TUNL CVC OVER 5 YRS    6/1/2022            IR INSERT TUNL CVC W/O PORT OVER 5 YR    6/26/2022        Fhx htn parents    No family history on file. Social History               Socioeconomic History           Marital status:  LEGALLY        Tobacco Use           Smoking status:  Former       Smokeless tobacco:  Never       Substance and Sexual Activity           Alcohol use:  No           Drug use: No                     Prior to Admission medications             Medication  Sig  Start Date  End Date  Taking? Authorizing Provider            amLODIPine (NORVASC) 10 mg tablet  Take 1 Tablet by mouth daily. 8/31/22      Padilla Garvin MD     losartan (COZAAR) 100 mg tablet  Take 1 Tablet by mouth daily. 8/31/22      Padilla Garvin MD     pantoprazole (PROTONIX) 40 mg tablet  Take 1 Tablet by mouth Daily (before breakfast). 8/31/22      Padilla Garvin MD     spironolactone (ALDACTONE) 100 mg tablet  Take 1 Tablet by mouth daily. 8/31/22      Padilla Garvin MD     calcitRIOL (ROCALTROL) 0.25 mcg capsule  Take 1 Capsule by mouth every Monday, Wednesday, Friday. 7/6/22      Isabel Goldstein MD     epoetin gabe-epbx (RETACRIT) 20,000 unit/mL injection  1 mL by SubCUTAneous route every Monday, Wednesday, Friday. Indications: anemia due to kidney failure  7/6/22      Isabel Goldstein MD     isosorbide mononitrate ER (IMDUR) 60 mg CR tablet  Take 1 Tablet by mouth daily. 6/7/22      Padilla Garvin MD     cyanocobalamin 1,000 mcg tablet  Take 1 Tablet by mouth daily. 6/7/22      Padilla Garvin MD     aspirin delayed-release 81 mg tablet  Take 1 Tablet by mouth daily. 9/7/21      Nani Cohen DO     doxazosin (CARDURA) 4 mg tablet  Take 2 mg by mouth daily.         Provider, Historical     sodium hypochlorite 0.0125% (DAKINS SOLUTION)  Apply 1 mL to affected area two (2) times a week. Provider, Historical     omeprazole (PRILOSEC) 40 mg capsule  Take 40 mg by mouth daily. Provider, Historical     sevelamer carbonate (RENVELA) 800 mg tab tab  Take 800 mg by mouth three (3) times daily (with meals). Provider, Historical            sodium bicarbonate 650 mg tablet  Take 650 mg by mouth three (3) times daily. Patient not taking: Reported on 2022        Provider, Historical                   Allergies        Allergen  Reactions           Penicillins  Rash       Vancomycin  Other (comments)             wilmer syndrome           Review of Systems   Gen: No fever, chills, malaise, weight loss/gain. Heent: No headache, rhinorrhea, epistaxis, ear pain, hearing loss, sinus pain, neck pain/stiffness, sore throat. Heart: No chest pain, palpitations, CALIXTO, pnd, or orthopnea. Resp: No cough, hemoptysis, wheezing and shortness of breath. GI: No nausea, vomiting, diarrhea, constipation, melena or hematochezia. : No urinary obstruction, dysuria or hematuria. Derm: No rash, new skin lesion or pruritis. Musc/skeletal: no bone or joint complains. Vasc: No edema, cyanosis or claudication. Endo: No heat/cold intolerance, no polyuria,polydipsia or polyphagia. Neuro: No unilateral weakness, numbness, tingling. No seizures. Heme: No easy bruising or bleeding. Physical Exam:        Physical Exam:   Visit Vitals      BP  (!) 129/111     Pulse  93     Temp  97.6 °F (36.4 °C)     Resp  16     Ht  5' 7\" (1.702 m)     Wt  75 kg (165 lb 5.5 oz)     SpO2  97%        BMI  25.90 kg/m²        O2 Device: None (Room air)      Temp (24hrs), Av.6 °F (36.4 °C), Min:97.6 °F (36.4 °C), Max:97.6 °F (36.4 °C)     No intake/output data recorded. No intake/output data recorded. General:   Awake, cooperative, no distress. Head:   Normocephalic, without obvious abnormality, atraumatic.         Eyes: Conjunctivae/corneas clear, sclera anicteric, PERRL, EOMs intact. Nose:  Nares normal. No drainage or sinus tenderness. Throat:  Lips, mucosa, and tongue normal. .        Neck:  Supple, symmetrical, trachea midline, no adenopathy. Lungs:    Clear to auscultation bilaterally. Tdc noted covered with gauze and clamp noted                   Heart:   Regular rate and rhythm, S1, S2 normal, no murmur, click, rub or gallop. Abdomen:  Soft, non-tender. Bowel sounds normal. No masses,  No organomegaly. Extremities:  Extremities normal, rt bka  no cyanosis or edema. Pulses:  2+ and symmetric all extremities. Skin:  Skin color-pink, texture, turgor normal. No rashes or lesions. Capillary refill normal         Neurologic:  CNII-XII intact. No focal motor or sensory deficit.            Labs Reviewed:      BMP:            Lab Results         Component  Value  Date/Time            NA  144  02/06/2023 12:46 PM       K  3.9  02/06/2023 12:46 PM       CL  108  02/06/2023 12:46 PM       CO2  29  02/06/2023 12:46 PM       AGAP  7  02/06/2023 12:46 PM       GLU  113 (H)  02/06/2023 12:46 PM       BUN  51 (H)  02/06/2023 12:46 PM            CREA  9.41 (H)  02/06/2023 12:46 PM        CMP:            Lab Results         Component  Value  Date/Time            NA  144  02/06/2023 12:46 PM       K  3.9  02/06/2023 12:46 PM       CL  108  02/06/2023 12:46 PM       CO2  29  02/06/2023 12:46 PM       AGAP  7  02/06/2023 12:46 PM       GLU  113 (H)  02/06/2023 12:46 PM       BUN  51 (H)  02/06/2023 12:46 PM       CREA  9.41 (H)  02/06/2023 12:46 PM       CA  8.6  02/06/2023 12:46 PM       MG  2.6  02/06/2023 12:46 PM       ALB  2.3 (L)  02/06/2023 12:46 PM       TP  6.9  02/06/2023 12:46 PM       GLOB  4.6 (H)  02/06/2023 12:46 PM       AGRAT  0.5 (L)  02/06/2023 12:46 PM            ALT  <6 (L)  02/06/2023 12:46 PM        CBC:            Lab Results         Component  Value  Date/Time            WBC 8.9  02/06/2023 12:46 PM       HGB  8.5 (L)  02/06/2023 12:46 PM       HCT  27.1 (L)  02/06/2023 12:46 PM            PLT  167  02/06/2023 12:46 PM        All Cardiac Markers in the last 24 hours: No results found for: CPK, CK, CKMMB, CKMB, RCK3, CKMBT, CKNDX, CKND1, FLOYD, TROPT, TROIQ, TARYN, TROPT, TNIPOC, BNP, BNPP   Recent Glucose Results:            Lab Results         Component  Value  Date/Time            GLU  113 (H)  02/06/2023 12:46 PM        ABG: No results found for: PH, PHI, PCO2, PCO2I, PO2, PO2I, HCO3, HCO3I, FIO2, FIO2I   COAGS:            Lab Results         Component  Value  Date/Time            APTT  46.7 (H)  02/06/2023 12:46 PM       PTP  15.1  02/06/2023 12:46 PM            INR  1.2  02/06/2023 12:46 PM        Liver Panel:            Lab Results         Component  Value  Date/Time            ALB  2.3 (L)  02/06/2023 12:46 PM       TP  6.9  02/06/2023 12:46 PM       GLOB  4.6 (H)  02/06/2023 12:46 PM       AGRAT  0.5 (L)  02/06/2023 12:46 PM       ALT  <6 (L)  02/06/2023 12:46 PM            AP  131 (H)  02/06/2023 12:46 PM        Pancreatic Markers: No results found for: AMYLPOCT, AML, LIPPOCT, LPSE      XR CHEST PORT      Result Date: 2/6/2023   INDICATION:  missed dialysis EXAM: Single portable view of chest 1323 . Comparison:8/28/2022 Findings: Cardiac silhouette is enlarged. Pulmonary vasculature is not engorged. There are no focal parenchymal opacities, effusions, or pneumothorax. There is  a tunneled left subclavian central line. Vascular stent overlies the proximal left forearm.        1. Enlarged cardiac silhouette, no evidence of pulmonary edema      Procedures/imaging: see electronic medical records for all procedures/Xrays and details which were not copied into this note but were reviewed prior to creation of Evelina Allen MD, Internal Medicine       CC: Antonina Arthur MD                                      Last signed by: Juan A Cagle MD at 2/6/2023  6:50 PM

## 2023-02-12 NOTE — PROGRESS NOTES
Hospitalist Progress Note    Patient: Hafsa Wells MRN: [id number]  CSN: [id number]    YOB: 1970  Age: 50 y.o. Sex: male    DOA: 2/6/2023 LOS:  LOS: 5 days                Assessment/Plan     Active Hospital Problems    Diagnosis     ESRD (end stage renal disease) ([facility] [N18.6]     Hypertension [I10]     HTN (hypertension) [I10]     Hx of BKA, right ([facility] [Z89.511]     ESRD (end stage renal disease) on dialysis ([facility] [N18.6, Z99.2]         Chief complaint :  ESRD    hypertensive emergency-  resolved    CT head no acute issue-no bleeding   Cosmo brain No acute intracranial abnormality   Not compliance home medication    Medication adjusted per nephrologist            Bleeding from HD catheter    [address] replaced per vascular surgeon-        End-stage renal disease on HD   Hd per nephrologist         Anemia -  due to esrd , h/h stable          Right BKA, fall precaution    Left foot heel ulcer -  Was seen at [place]. Getting antibiotic with HD  Follow up with podiatry    Review of systems  General: No fevers or chills. Cardiovascular: No chest pain or pressure. No palpitations. Pulmonary: No shortness of breath. Gastrointestinal: No nausea, vomiting. Physical Exam:  General: Awake, cooperative, no acute distress    HEENT: NC, Atraumatic. PERRLA, anicteric sclerae. Lungs: CTA Bilaterally. No Wheezing/Rhonchi/Rales. Heart:  S1 S2,  No murmur, No Rubs, No Gallops  Abdomen: Soft, Non distended, Non tender. +Bowel sounds,   Extremities: Right BKA, left foot heel ulcer with dressing  Psych:   Not anxious or agitated. Neurologic:  No acute neurological deficit. Vital signs/Intake and Output:  Visit Vitals  BP (!) 192/91   Pulse 82   Temp 98.1 °F (36.7 °C) (Oral)   Resp 18   SpO2 100%     Current Shift:  No intake/output data recorded. Last three shifts:  No intake/output data recorded.             Labs: Results:       Chemistry Recent Labs     02/09/23  0325 02/10/23  0416 02/11/23  0545  138 136   K 3.4* 3.9 4.3    104 107   CO2 26 22 24   BUN 28* 38* 24*   ,  Lab Results   Component Value Date/Time    LACTA 1.9 06/29/2022 06:36 PM      CBC w/Diff Recent Labs     02/09/23  0325 02/10/23  0416 02/11/23  0545   WBC 5.6 4.5* 4.7   RBC 3.55* 3.52* 3.92*   HGB 9.1* 9.0* 10.1*   HCT 28.0* 28.2* 31.2*    163 126*      Cardiac Enzymes Lab Results   Component Value Date    CKTOTAL 17 (L) 07/05/2022    CKMB 1.9 08/27/2021    CKMBINDEX 1.7 08/27/2021    TROPONINI 0.06 (H) 08/27/2021   ,  Lab Results   Component Value Date    BNP 92,443 (H) 06/20/2022      Coagulation No results for input(s): INR, APTT in the last 72 hours. Invalid input(s): PTP    Lipid Panel Lab Results   Component Value Date/Time    CHOL 115 06/02/2022 09:45 AM    HDL 62 06/02/2022 09:45 AM      Pancreas No results for input(s): LIPASE in the last 72 hours. ,No results for input(s): AMYLASE in the last 72 hours. Liver Enzymes No results for input(s): TP, ALB in the last 72 hours.     Invalid input(s): TBIL, AP, SGOT, GPT, DBIL   Thyroid Studies Lab Results   Component Value Date/Time    TSH 2.38 08/29/2021 01:20 AM        Procedures/imaging: see electronic medical records for all procedures/Xrays and details which were not copied into this note but were reviewed prior to creation of Plan

## 2023-04-05 ENCOUNTER — HOSPITAL ENCOUNTER (INPATIENT)
Facility: HOSPITAL | Age: 53
LOS: 10 days | Discharge: HOME HEALTH | DRG: 291 | End: 2023-04-15
Attending: EMERGENCY MEDICINE | Admitting: HOSPITALIST
Payer: MEDICARE

## 2023-04-05 ENCOUNTER — APPOINTMENT (OUTPATIENT)
Facility: HOSPITAL | Age: 53
DRG: 291 | End: 2023-04-05
Payer: MEDICARE

## 2023-04-05 DIAGNOSIS — N18.6 ESRD (END STAGE RENAL DISEASE) ON DIALYSIS (HCC): Primary | ICD-10-CM

## 2023-04-05 DIAGNOSIS — N19 UREMIA: ICD-10-CM

## 2023-04-05 DIAGNOSIS — Z99.2 ESRD (END STAGE RENAL DISEASE) ON DIALYSIS (HCC): Primary | ICD-10-CM

## 2023-04-05 DIAGNOSIS — R19.7 DIARRHEA, UNSPECIFIED TYPE: ICD-10-CM

## 2023-04-05 DIAGNOSIS — R53.83 FATIGUE, UNSPECIFIED TYPE: ICD-10-CM

## 2023-04-05 PROBLEM — R91.8 PULMONARY INFILTRATES: Status: RESOLVED | Noted: 2022-08-27 | Resolved: 2023-04-05

## 2023-04-05 PROBLEM — K52.9 CHRONIC DIARRHEA: Status: ACTIVE | Noted: 2022-08-28

## 2023-04-05 PROBLEM — I16.1 HYPERTENSIVE EMERGENCY: Status: RESOLVED | Noted: 2022-08-26 | Resolved: 2023-04-05

## 2023-04-05 PROBLEM — Z91.158 NON-COMPLIANCE WITH RENAL DIALYSIS: Status: ACTIVE | Noted: 2021-08-27

## 2023-04-05 PROBLEM — J81.0 ACUTE PULMONARY EDEMA (HCC): Status: RESOLVED | Noted: 2022-06-20 | Resolved: 2023-04-05

## 2023-04-05 PROBLEM — R09.02 HYPOXIA: Status: RESOLVED | Noted: 2022-08-26 | Resolved: 2023-04-05

## 2023-04-05 PROBLEM — D50.9 IRON DEFICIENCY ANEMIA: Status: ACTIVE | Noted: 2018-03-04

## 2023-04-05 PROBLEM — D64.9 CHRONIC ANEMIA: Status: ACTIVE | Noted: 2022-08-27

## 2023-04-05 PROBLEM — G92.8 TOXIC METABOLIC ENCEPHALOPATHY: Status: ACTIVE | Noted: 2023-04-05

## 2023-04-05 PROBLEM — I51.7 CARDIOMEGALY: Status: RESOLVED | Noted: 2022-08-27 | Resolved: 2023-04-05

## 2023-04-05 LAB
ALBUMIN SERPL-MCNC: 3.1 G/DL (ref 3.4–5)
ALBUMIN/GLOB SERPL: 0.6 (ref 0.8–1.7)
ALP SERPL-CCNC: 112 U/L (ref 45–117)
ALT SERPL-CCNC: 12 U/L (ref 16–61)
ANION GAP SERPL CALC-SCNC: 17 MMOL/L (ref 3–18)
AST SERPL-CCNC: 7 U/L (ref 10–38)
BASOPHILS # BLD: 0 K/UL (ref 0–0.1)
BASOPHILS NFR BLD: 1 % (ref 0–2)
BILIRUB SERPL-MCNC: 0.5 MG/DL (ref 0.2–1)
BUN SERPL-MCNC: 119 MG/DL (ref 7–18)
BUN/CREAT SERPL: 8 (ref 12–20)
CALCIUM SERPL-MCNC: 6.1 MG/DL (ref 8.5–10.1)
CHLORIDE SERPL-SCNC: 114 MMOL/L (ref 100–111)
CO2 SERPL-SCNC: 12 MMOL/L (ref 21–32)
CREAT SERPL-MCNC: 14.5 MG/DL (ref 0.6–1.3)
DIFFERENTIAL METHOD BLD: ABNORMAL
EOSINOPHIL # BLD: 0.1 K/UL (ref 0–0.4)
EOSINOPHIL NFR BLD: 3 % (ref 0–5)
ERYTHROCYTE [DISTWIDTH] IN BLOOD BY AUTOMATED COUNT: 20 % (ref 11.6–14.5)
FLUAV RNA SPEC QL NAA+PROBE: NOT DETECTED
FLUBV RNA SPEC QL NAA+PROBE: NOT DETECTED
GLOBULIN SER CALC-MCNC: 5 G/DL (ref 2–4)
GLUCOSE BLD STRIP.AUTO-MCNC: 173 MG/DL (ref 70–110)
GLUCOSE BLD STRIP.AUTO-MCNC: 84 MG/DL (ref 70–110)
GLUCOSE SERPL-MCNC: 44 MG/DL (ref 74–99)
HCT VFR BLD AUTO: 27.1 % (ref 36–48)
HGB BLD-MCNC: 9.1 G/DL (ref 13–16)
IMM GRANULOCYTES # BLD AUTO: 0 K/UL (ref 0–0.04)
IMM GRANULOCYTES NFR BLD AUTO: 0 % (ref 0–0.5)
LACTATE SERPL-SCNC: 1.5 MMOL/L (ref 0.4–2)
LYMPHOCYTES # BLD: 0.3 K/UL (ref 0.9–3.6)
LYMPHOCYTES NFR BLD: 7 % (ref 21–52)
MCH RBC QN AUTO: 27.4 PG (ref 24–34)
MCHC RBC AUTO-ENTMCNC: 33.6 G/DL (ref 31–37)
MCV RBC AUTO: 81.6 FL (ref 78–100)
MONOCYTES # BLD: 0.6 K/UL (ref 0.05–1.2)
MONOCYTES NFR BLD: 12 % (ref 3–10)
NEUTS SEG # BLD: 3.4 K/UL (ref 1.8–8)
NEUTS SEG NFR BLD: 77 % (ref 40–73)
NRBC # BLD: 0 K/UL (ref 0–0.01)
NRBC BLD-RTO: 0 PER 100 WBC
PLATELET # BLD AUTO: 170 K/UL (ref 135–420)
PMV BLD AUTO: 9.9 FL (ref 9.2–11.8)
POTASSIUM SERPL-SCNC: 4.9 MMOL/L (ref 3.5–5.5)
PROT SERPL-MCNC: 8.1 G/DL (ref 6.4–8.2)
RBC # BLD AUTO: 3.32 M/UL (ref 4.35–5.65)
SARS-COV-2 RNA RESP QL NAA+PROBE: NOT DETECTED
SODIUM SERPL-SCNC: 143 MMOL/L (ref 136–145)
TROPONIN I SERPL HS-MCNC: 63 NG/L (ref 0–78)
WBC # BLD AUTO: 4.5 K/UL (ref 4.6–13.2)

## 2023-04-05 PROCEDURE — 84484 ASSAY OF TROPONIN QUANT: CPT

## 2023-04-05 PROCEDURE — 99285 EMERGENCY DEPT VISIT HI MDM: CPT

## 2023-04-05 PROCEDURE — 87506 IADNA-DNA/RNA PROBE TQ 6-11: CPT

## 2023-04-05 PROCEDURE — 82962 GLUCOSE BLOOD TEST: CPT

## 2023-04-05 PROCEDURE — 6360000002 HC RX W HCPCS: Performed by: HOSPITALIST

## 2023-04-05 PROCEDURE — 71045 X-RAY EXAM CHEST 1 VIEW: CPT

## 2023-04-05 PROCEDURE — 93005 ELECTROCARDIOGRAM TRACING: CPT | Performed by: EMERGENCY MEDICINE

## 2023-04-05 PROCEDURE — 87636 SARSCOV2 & INF A&B AMP PRB: CPT

## 2023-04-05 PROCEDURE — 1100000003 HC PRIVATE W/ TELEMETRY

## 2023-04-05 PROCEDURE — 80053 COMPREHEN METABOLIC PANEL: CPT

## 2023-04-05 PROCEDURE — 85025 COMPLETE CBC W/AUTO DIFF WBC: CPT

## 2023-04-05 PROCEDURE — 70450 CT HEAD/BRAIN W/O DYE: CPT

## 2023-04-05 PROCEDURE — 2580000003 HC RX 258

## 2023-04-05 PROCEDURE — 96361 HYDRATE IV INFUSION ADD-ON: CPT

## 2023-04-05 PROCEDURE — 96360 HYDRATION IV INFUSION INIT: CPT

## 2023-04-05 PROCEDURE — 83605 ASSAY OF LACTIC ACID: CPT

## 2023-04-05 RX ORDER — ONDANSETRON 2 MG/ML
4 INJECTION INTRAMUSCULAR; INTRAVENOUS EVERY 6 HOURS PRN
Status: DISCONTINUED | OUTPATIENT
Start: 2023-04-05 | End: 2023-04-15 | Stop reason: HOSPADM

## 2023-04-05 RX ORDER — SEVELAMER CARBONATE 800 MG/1
800 TABLET, FILM COATED ORAL
Status: DISCONTINUED | OUTPATIENT
Start: 2023-04-05 | End: 2023-04-15 | Stop reason: HOSPADM

## 2023-04-05 RX ORDER — SODIUM BICARBONATE 650 MG/1
650 TABLET ORAL 3 TIMES DAILY
Status: DISCONTINUED | OUTPATIENT
Start: 2023-04-05 | End: 2023-04-07

## 2023-04-05 RX ORDER — HYDRALAZINE HYDROCHLORIDE 20 MG/ML
20 INJECTION INTRAMUSCULAR; INTRAVENOUS EVERY 6 HOURS PRN
Status: DISCONTINUED | OUTPATIENT
Start: 2023-04-05 | End: 2023-04-15 | Stop reason: HOSPADM

## 2023-04-05 RX ORDER — INSULIN LISPRO 100 [IU]/ML
0-4 INJECTION, SOLUTION INTRAVENOUS; SUBCUTANEOUS
Status: DISCONTINUED | OUTPATIENT
Start: 2023-04-05 | End: 2023-04-15 | Stop reason: HOSPADM

## 2023-04-05 RX ORDER — ISOSORBIDE MONONITRATE 60 MG/1
60 TABLET, EXTENDED RELEASE ORAL DAILY
Status: DISCONTINUED | OUTPATIENT
Start: 2023-04-05 | End: 2023-04-15 | Stop reason: HOSPADM

## 2023-04-05 RX ORDER — ASPIRIN 81 MG/1
81 TABLET ORAL DAILY
Status: DISCONTINUED | OUTPATIENT
Start: 2023-04-05 | End: 2023-04-15 | Stop reason: HOSPADM

## 2023-04-05 RX ORDER — CALCITRIOL 0.25 UG/1
0.25 CAPSULE, LIQUID FILLED ORAL DAILY
Status: DISCONTINUED | OUTPATIENT
Start: 2023-04-05 | End: 2023-04-07

## 2023-04-05 RX ORDER — INSULIN LISPRO 100 [IU]/ML
0-4 INJECTION, SOLUTION INTRAVENOUS; SUBCUTANEOUS NIGHTLY
Status: DISCONTINUED | OUTPATIENT
Start: 2023-04-05 | End: 2023-04-15 | Stop reason: HOSPADM

## 2023-04-05 RX ORDER — DOXAZOSIN MESYLATE 1 MG/1
2 TABLET ORAL DAILY
Status: DISCONTINUED | OUTPATIENT
Start: 2023-04-05 | End: 2023-04-13

## 2023-04-05 RX ORDER — SPIRONOLACTONE 100 MG/1
100 TABLET, FILM COATED ORAL DAILY
Status: DISCONTINUED | OUTPATIENT
Start: 2023-04-05 | End: 2023-04-13

## 2023-04-05 RX ORDER — AMLODIPINE BESYLATE 5 MG/1
10 TABLET ORAL DAILY
Status: DISCONTINUED | OUTPATIENT
Start: 2023-04-05 | End: 2023-04-15 | Stop reason: HOSPADM

## 2023-04-05 RX ORDER — DEXTROSE MONOHYDRATE 100 MG/ML
250 INJECTION, SOLUTION INTRAVENOUS
Status: COMPLETED | OUTPATIENT
Start: 2023-04-05 | End: 2023-04-05

## 2023-04-05 RX ORDER — PANTOPRAZOLE SODIUM 40 MG/1
40 TABLET, DELAYED RELEASE ORAL
Status: DISCONTINUED | OUTPATIENT
Start: 2023-04-06 | End: 2023-04-15 | Stop reason: HOSPADM

## 2023-04-05 RX ORDER — DEXTROSE MONOHYDRATE 100 MG/ML
INJECTION, SOLUTION INTRAVENOUS
Status: COMPLETED
Start: 2023-04-05 | End: 2023-04-05

## 2023-04-05 RX ORDER — LOSARTAN POTASSIUM 50 MG/1
100 TABLET ORAL DAILY
Status: DISCONTINUED | OUTPATIENT
Start: 2023-04-05 | End: 2023-04-15 | Stop reason: HOSPADM

## 2023-04-05 RX ORDER — LANOLIN ALCOHOL/MO/W.PET/CERES
1000 CREAM (GRAM) TOPICAL DAILY
Status: DISCONTINUED | OUTPATIENT
Start: 2023-04-05 | End: 2023-04-15 | Stop reason: HOSPADM

## 2023-04-05 RX ORDER — ACETAMINOPHEN 325 MG/1
650 TABLET ORAL EVERY 4 HOURS PRN
Status: DISCONTINUED | OUTPATIENT
Start: 2023-04-05 | End: 2023-04-15 | Stop reason: HOSPADM

## 2023-04-05 RX ORDER — DEXTROSE MONOHYDRATE 100 MG/ML
INJECTION, SOLUTION INTRAVENOUS CONTINUOUS PRN
Status: DISCONTINUED | OUTPATIENT
Start: 2023-04-05 | End: 2023-04-06 | Stop reason: SDUPTHER

## 2023-04-05 RX ADMIN — HYDRALAZINE HYDROCHLORIDE 20 MG: 20 INJECTION INTRAMUSCULAR; INTRAVENOUS at 20:45

## 2023-04-05 RX ADMIN — DEXTROSE MONOHYDRATE 250 ML: 100 INJECTION, SOLUTION INTRAVENOUS at 14:01

## 2023-04-05 ASSESSMENT — PAIN SCALES - GENERAL
PAINLEVEL_OUTOF10: 0
PAINLEVEL_OUTOF10: 0

## 2023-04-05 ASSESSMENT — PAIN - FUNCTIONAL ASSESSMENT: PAIN_FUNCTIONAL_ASSESSMENT: NONE - DENIES PAIN

## 2023-04-06 LAB
ALBUMIN SERPL-MCNC: 2.9 G/DL (ref 3.4–5)
ALBUMIN/GLOB SERPL: 0.6 (ref 0.8–1.7)
ALP SERPL-CCNC: 99 U/L (ref 45–117)
ALT SERPL-CCNC: 11 U/L (ref 16–61)
ANION GAP SERPL CALC-SCNC: 10 MMOL/L (ref 3–18)
AST SERPL-CCNC: 8 U/L (ref 10–38)
BASOPHILS # BLD: 0 K/UL (ref 0–0.1)
BASOPHILS NFR BLD: 1 % (ref 0–2)
BILIRUB SERPL-MCNC: 0.6 MG/DL (ref 0.2–1)
BUN SERPL-MCNC: 50 MG/DL (ref 7–18)
BUN/CREAT SERPL: 7 (ref 12–20)
C COLI+JEJUNI TUF STL QL NAA+PROBE: NEGATIVE
CALCIUM SERPL-MCNC: 7.8 MG/DL (ref 8.5–10.1)
CHLORIDE SERPL-SCNC: 106 MMOL/L (ref 100–111)
CO2 SERPL-SCNC: 23 MMOL/L (ref 21–32)
CREAT SERPL-MCNC: 6.75 MG/DL (ref 0.6–1.3)
DIFFERENTIAL METHOD BLD: ABNORMAL
EC STX1+STX2 GENES STL QL NAA+PROBE: NEGATIVE
EOSINOPHIL # BLD: 0.1 K/UL (ref 0–0.4)
EOSINOPHIL NFR BLD: 4 % (ref 0–5)
ERYTHROCYTE [DISTWIDTH] IN BLOOD BY AUTOMATED COUNT: 19.9 % (ref 11.6–14.5)
ETEC ELTA+ESTB GENES STL QL NAA+PROBE: NEGATIVE
GLOBULIN SER CALC-MCNC: 4.5 G/DL (ref 2–4)
GLUCOSE BLD STRIP.AUTO-MCNC: 120 MG/DL (ref 70–110)
GLUCOSE BLD STRIP.AUTO-MCNC: 149 MG/DL (ref 70–110)
GLUCOSE BLD STRIP.AUTO-MCNC: 57 MG/DL (ref 70–110)
GLUCOSE BLD STRIP.AUTO-MCNC: 61 MG/DL (ref 70–110)
GLUCOSE BLD STRIP.AUTO-MCNC: 67 MG/DL (ref 70–110)
GLUCOSE BLD STRIP.AUTO-MCNC: 89 MG/DL (ref 70–110)
GLUCOSE BLD STRIP.AUTO-MCNC: 92 MG/DL (ref 70–110)
GLUCOSE BLD STRIP.AUTO-MCNC: 97 MG/DL (ref 70–110)
GLUCOSE SERPL-MCNC: 67 MG/DL (ref 74–99)
HBV SURFACE AB SER QL IA: NEGATIVE
HBV SURFACE AB SERPL IA-ACNC: 4.87 MIU/ML
HBV SURFACE AG SER QL: <0.1 INDEX
HBV SURFACE AG SER QL: NEGATIVE
HCT VFR BLD AUTO: 26.4 % (ref 36–48)
HGB BLD-MCNC: 9 G/DL (ref 13–16)
IMM GRANULOCYTES # BLD AUTO: 0 K/UL (ref 0–0.04)
IMM GRANULOCYTES NFR BLD AUTO: 1 % (ref 0–0.5)
LYMPHOCYTES # BLD: 0.3 K/UL (ref 0.9–3.6)
LYMPHOCYTES NFR BLD: 9 % (ref 21–52)
Lab: ABNORMAL
MCH RBC QN AUTO: 27.1 PG (ref 24–34)
MCHC RBC AUTO-ENTMCNC: 34.1 G/DL (ref 31–37)
MCV RBC AUTO: 79.5 FL (ref 78–100)
MONOCYTES # BLD: 0.3 K/UL (ref 0.05–1.2)
MONOCYTES NFR BLD: 9 % (ref 3–10)
NEUTS SEG # BLD: 3.1 K/UL (ref 1.8–8)
NEUTS SEG NFR BLD: 78 % (ref 40–73)
NRBC # BLD: 0 K/UL (ref 0–0.01)
NRBC BLD-RTO: 0 PER 100 WBC
P SHIGELLOIDES DNA STL QL NAA+PROBE: NEGATIVE
PLATELET # BLD AUTO: 168 K/UL (ref 135–420)
PMV BLD AUTO: 10.9 FL (ref 9.2–11.8)
POTASSIUM SERPL-SCNC: 3.2 MMOL/L (ref 3.5–5.5)
PROT SERPL-MCNC: 7.4 G/DL (ref 6.4–8.2)
RBC # BLD AUTO: 3.32 M/UL (ref 4.35–5.65)
SALMONELLA SP SPAO STL QL NAA+PROBE: NEGATIVE
SHIGELLA SP+EIEC IPAH STL QL NAA+PROBE: NEGATIVE
SODIUM SERPL-SCNC: 139 MMOL/L (ref 136–145)
V CHOL+PARA+VUL DNA STL QL NAA+NON-PROBE: NEGATIVE
WBC # BLD AUTO: 3.9 K/UL (ref 4.6–13.2)
Y ENTEROCOL DNA STL QL NAA+NON-PROBE: NEGATIVE

## 2023-04-06 PROCEDURE — 86706 HEP B SURFACE ANTIBODY: CPT

## 2023-04-06 PROCEDURE — 6360000002 HC RX W HCPCS: Performed by: HOSPITALIST

## 2023-04-06 PROCEDURE — 97602 WOUND(S) CARE NON-SELECTIVE: CPT

## 2023-04-06 PROCEDURE — 1100000003 HC PRIVATE W/ TELEMETRY

## 2023-04-06 PROCEDURE — 6370000000 HC RX 637 (ALT 250 FOR IP): Performed by: HOSPITALIST

## 2023-04-06 PROCEDURE — 99222 1ST HOSP IP/OBS MODERATE 55: CPT | Performed by: NURSE PRACTITIONER

## 2023-04-06 PROCEDURE — 2700000000 HC OXYGEN THERAPY PER DAY

## 2023-04-06 PROCEDURE — 5A1D70Z PERFORMANCE OF URINARY FILTRATION, INTERMITTENT, LESS THAN 6 HOURS PER DAY: ICD-10-PCS | Performed by: INTERNAL MEDICINE

## 2023-04-06 PROCEDURE — 36415 COLL VENOUS BLD VENIPUNCTURE: CPT

## 2023-04-06 PROCEDURE — 80053 COMPREHEN METABOLIC PANEL: CPT

## 2023-04-06 PROCEDURE — 90935 HEMODIALYSIS ONE EVALUATION: CPT

## 2023-04-06 PROCEDURE — 82962 GLUCOSE BLOOD TEST: CPT

## 2023-04-06 PROCEDURE — 85025 COMPLETE CBC W/AUTO DIFF WBC: CPT

## 2023-04-06 PROCEDURE — 87340 HEPATITIS B SURFACE AG IA: CPT

## 2023-04-06 RX ORDER — DEXTROSE MONOHYDRATE 100 MG/ML
INJECTION, SOLUTION INTRAVENOUS CONTINUOUS PRN
Status: DISCONTINUED | OUTPATIENT
Start: 2023-04-06 | End: 2023-04-14 | Stop reason: SDUPTHER

## 2023-04-06 RX ADMIN — DOXAZOSIN 2 MG: 1 TABLET ORAL at 09:31

## 2023-04-06 RX ADMIN — LOSARTAN POTASSIUM 100 MG: 50 TABLET, FILM COATED ORAL at 09:31

## 2023-04-06 RX ADMIN — SODIUM BICARBONATE 650 MG: 650 TABLET ORAL at 22:13

## 2023-04-06 RX ADMIN — AMLODIPINE BESYLATE 10 MG: 5 TABLET ORAL at 09:32

## 2023-04-06 RX ADMIN — CYANOCOBALAMIN TAB 1000 MCG 1000 MCG: 1000 TAB at 09:32

## 2023-04-06 RX ADMIN — SEVELAMER CARBONATE 800 MG: 800 TABLET, FILM COATED ORAL at 09:31

## 2023-04-06 RX ADMIN — ASPIRIN 81 MG: 81 TABLET, COATED ORAL at 09:32

## 2023-04-06 RX ADMIN — HYDRALAZINE HYDROCHLORIDE 20 MG: 20 INJECTION INTRAMUSCULAR; INTRAVENOUS at 03:19

## 2023-04-06 RX ADMIN — ISOSORBIDE MONONITRATE 60 MG: 60 TABLET, EXTENDED RELEASE ORAL at 09:32

## 2023-04-06 RX ADMIN — SODIUM BICARBONATE 650 MG: 650 TABLET ORAL at 09:31

## 2023-04-06 RX ADMIN — PANTOPRAZOLE SODIUM 40 MG: 40 TABLET, DELAYED RELEASE ORAL at 06:46

## 2023-04-06 RX ADMIN — SPIRONOLACTONE 100 MG: 100 TABLET ORAL at 09:31

## 2023-04-06 ASSESSMENT — PAIN SCALES - GENERAL
PAINLEVEL_OUTOF10: 0
PAINLEVEL_OUTOF10: 0

## 2023-04-07 LAB
ALBUMIN SERPL-MCNC: 2.6 G/DL (ref 3.4–5)
ALBUMIN/GLOB SERPL: 0.5 (ref 0.8–1.7)
ALP SERPL-CCNC: 105 U/L (ref 45–117)
ALT SERPL-CCNC: 10 U/L (ref 16–61)
ANION GAP SERPL CALC-SCNC: 4 MMOL/L (ref 3–18)
AST SERPL-CCNC: 9 U/L (ref 10–38)
BASOPHILS # BLD: 0 K/UL (ref 0–0.1)
BASOPHILS NFR BLD: 0 % (ref 0–2)
BILIRUB SERPL-MCNC: 0.6 MG/DL (ref 0.2–1)
BUN SERPL-MCNC: 31 MG/DL (ref 7–18)
BUN/CREAT SERPL: 6 (ref 12–20)
CALCIUM SERPL-MCNC: 7.8 MG/DL (ref 8.5–10.1)
CHLORIDE SERPL-SCNC: 107 MMOL/L (ref 100–111)
CO2 SERPL-SCNC: 27 MMOL/L (ref 21–32)
CREAT SERPL-MCNC: 5.35 MG/DL (ref 0.6–1.3)
DIFFERENTIAL METHOD BLD: ABNORMAL
EKG ATRIAL RATE: 91 BPM
EKG DIAGNOSIS: NORMAL
EKG P AXIS: 43 DEGREES
EKG P-R INTERVAL: 176 MS
EKG Q-T INTERVAL: 438 MS
EKG QRS DURATION: 82 MS
EKG QTC CALCULATION (BAZETT): 538 MS
EKG R AXIS: 5 DEGREES
EKG T AXIS: 122 DEGREES
EKG VENTRICULAR RATE: 91 BPM
EOSINOPHIL # BLD: 0.2 K/UL (ref 0–0.4)
EOSINOPHIL NFR BLD: 3 % (ref 0–5)
ERYTHROCYTE [DISTWIDTH] IN BLOOD BY AUTOMATED COUNT: 20 % (ref 11.6–14.5)
GLOBULIN SER CALC-MCNC: 5.1 G/DL (ref 2–4)
GLUCOSE BLD STRIP.AUTO-MCNC: 108 MG/DL (ref 70–110)
GLUCOSE BLD STRIP.AUTO-MCNC: 113 MG/DL (ref 70–110)
GLUCOSE BLD STRIP.AUTO-MCNC: 117 MG/DL (ref 70–110)
GLUCOSE BLD STRIP.AUTO-MCNC: 79 MG/DL (ref 70–110)
GLUCOSE BLD STRIP.AUTO-MCNC: 88 MG/DL (ref 70–110)
GLUCOSE BLD STRIP.AUTO-MCNC: 93 MG/DL (ref 70–110)
GLUCOSE SERPL-MCNC: 81 MG/DL (ref 74–99)
HCT VFR BLD AUTO: 31.1 % (ref 36–48)
HGB BLD-MCNC: 10.5 G/DL (ref 13–16)
IMM GRANULOCYTES # BLD AUTO: 0 K/UL (ref 0–0.04)
IMM GRANULOCYTES NFR BLD AUTO: 0 % (ref 0–0.5)
LYMPHOCYTES # BLD: 0.5 K/UL (ref 0.9–3.6)
LYMPHOCYTES NFR BLD: 10 % (ref 21–52)
MCH RBC QN AUTO: 27.3 PG (ref 24–34)
MCHC RBC AUTO-ENTMCNC: 33.8 G/DL (ref 31–37)
MCV RBC AUTO: 81 FL (ref 78–100)
MONOCYTES # BLD: 0.7 K/UL (ref 0.05–1.2)
MONOCYTES NFR BLD: 15 % (ref 3–10)
NEUTS SEG # BLD: 3.6 K/UL (ref 1.8–8)
NEUTS SEG NFR BLD: 72 % (ref 40–73)
NRBC # BLD: 0 K/UL (ref 0–0.01)
NRBC BLD-RTO: 0 PER 100 WBC
PLATELET # BLD AUTO: 184 K/UL (ref 135–420)
PMV BLD AUTO: 10.4 FL (ref 9.2–11.8)
POTASSIUM SERPL-SCNC: 4.1 MMOL/L (ref 3.5–5.5)
PROT SERPL-MCNC: 7.7 G/DL (ref 6.4–8.2)
RBC # BLD AUTO: 3.84 M/UL (ref 4.35–5.65)
SODIUM SERPL-SCNC: 138 MMOL/L (ref 136–145)
WBC # BLD AUTO: 5 K/UL (ref 4.6–13.2)

## 2023-04-07 PROCEDURE — 85025 COMPLETE CBC W/AUTO DIFF WBC: CPT

## 2023-04-07 PROCEDURE — 1100000003 HC PRIVATE W/ TELEMETRY

## 2023-04-07 PROCEDURE — 80053 COMPREHEN METABOLIC PANEL: CPT

## 2023-04-07 PROCEDURE — 82962 GLUCOSE BLOOD TEST: CPT

## 2023-04-07 PROCEDURE — 36415 COLL VENOUS BLD VENIPUNCTURE: CPT

## 2023-04-07 PROCEDURE — 6370000000 HC RX 637 (ALT 250 FOR IP): Performed by: HOSPITALIST

## 2023-04-07 RX ORDER — CALCITRIOL 0.25 UG/1
0.5 CAPSULE, LIQUID FILLED ORAL DAILY
Status: DISCONTINUED | OUTPATIENT
Start: 2023-04-08 | End: 2023-04-15 | Stop reason: HOSPADM

## 2023-04-07 RX ADMIN — LOSARTAN POTASSIUM 100 MG: 50 TABLET, FILM COATED ORAL at 09:55

## 2023-04-07 RX ADMIN — DOXAZOSIN 2 MG: 1 TABLET ORAL at 09:55

## 2023-04-07 RX ADMIN — ASPIRIN 81 MG: 81 TABLET, COATED ORAL at 09:55

## 2023-04-07 RX ADMIN — PANTOPRAZOLE SODIUM 40 MG: 40 TABLET, DELAYED RELEASE ORAL at 06:50

## 2023-04-07 RX ADMIN — CYANOCOBALAMIN TAB 1000 MCG 1000 MCG: 1000 TAB at 09:55

## 2023-04-07 RX ADMIN — ISOSORBIDE MONONITRATE 60 MG: 60 TABLET, EXTENDED RELEASE ORAL at 09:55

## 2023-04-07 RX ADMIN — SEVELAMER CARBONATE 800 MG: 800 TABLET, FILM COATED ORAL at 11:22

## 2023-04-07 RX ADMIN — SPIRONOLACTONE 100 MG: 100 TABLET ORAL at 09:55

## 2023-04-07 RX ADMIN — AMLODIPINE BESYLATE 10 MG: 5 TABLET ORAL at 09:55

## 2023-04-07 RX ADMIN — SODIUM BICARBONATE 650 MG: 650 TABLET ORAL at 09:55

## 2023-04-07 RX ADMIN — SEVELAMER CARBONATE 800 MG: 800 TABLET, FILM COATED ORAL at 09:55

## 2023-04-07 ASSESSMENT — PAIN SCALES - GENERAL
PAINLEVEL_OUTOF10: 0
PAINLEVEL_OUTOF10: 0

## 2023-04-08 ENCOUNTER — HOME HEALTH ADMISSION (OUTPATIENT)
Age: 53
End: 2023-04-08

## 2023-04-08 LAB
ALBUMIN SERPL-MCNC: 2.4 G/DL (ref 3.4–5)
ALBUMIN/GLOB SERPL: 0.6 (ref 0.8–1.7)
ALP SERPL-CCNC: 116 U/L (ref 45–117)
ALT SERPL-CCNC: 16 U/L (ref 16–61)
ANION GAP SERPL CALC-SCNC: 3 MMOL/L (ref 3–18)
AST SERPL-CCNC: 15 U/L (ref 10–38)
BASOPHILS # BLD: 0 K/UL (ref 0–0.1)
BASOPHILS NFR BLD: 1 % (ref 0–2)
BILIRUB SERPL-MCNC: 0.3 MG/DL (ref 0.2–1)
BUN SERPL-MCNC: 23 MG/DL (ref 7–18)
BUN/CREAT SERPL: 6 (ref 12–20)
CALCIUM SERPL-MCNC: 7.3 MG/DL (ref 8.5–10.1)
CHLORIDE SERPL-SCNC: 106 MMOL/L (ref 100–111)
CO2 SERPL-SCNC: 28 MMOL/L (ref 21–32)
CREAT SERPL-MCNC: 4.11 MG/DL (ref 0.6–1.3)
DIFFERENTIAL METHOD BLD: ABNORMAL
EOSINOPHIL # BLD: 0.2 K/UL (ref 0–0.4)
EOSINOPHIL NFR BLD: 4 % (ref 0–5)
ERYTHROCYTE [DISTWIDTH] IN BLOOD BY AUTOMATED COUNT: 20.5 % (ref 11.6–14.5)
GLOBULIN SER CALC-MCNC: 4.1 G/DL (ref 2–4)
GLUCOSE BLD STRIP.AUTO-MCNC: 118 MG/DL (ref 70–110)
GLUCOSE BLD STRIP.AUTO-MCNC: 70 MG/DL (ref 70–110)
GLUCOSE BLD STRIP.AUTO-MCNC: 77 MG/DL (ref 70–110)
GLUCOSE BLD STRIP.AUTO-MCNC: 90 MG/DL (ref 70–110)
GLUCOSE SERPL-MCNC: 74 MG/DL (ref 74–99)
HCT VFR BLD AUTO: 30.6 % (ref 36–48)
HGB BLD-MCNC: 9.9 G/DL (ref 13–16)
IMM GRANULOCYTES # BLD AUTO: 0 K/UL (ref 0–0.04)
IMM GRANULOCYTES NFR BLD AUTO: 0 % (ref 0–0.5)
LYMPHOCYTES # BLD: 0.5 K/UL (ref 0.9–3.6)
LYMPHOCYTES NFR BLD: 10 % (ref 21–52)
MCH RBC QN AUTO: 27.2 PG (ref 24–34)
MCHC RBC AUTO-ENTMCNC: 32.4 G/DL (ref 31–37)
MCV RBC AUTO: 84.1 FL (ref 78–100)
MONOCYTES # BLD: 0.7 K/UL (ref 0.05–1.2)
MONOCYTES NFR BLD: 16 % (ref 3–10)
NEUTS SEG # BLD: 3 K/UL (ref 1.8–8)
NEUTS SEG NFR BLD: 68 % (ref 40–73)
NRBC # BLD: 0 K/UL (ref 0–0.01)
NRBC BLD-RTO: 0 PER 100 WBC
PLATELET # BLD AUTO: 193 K/UL (ref 135–420)
PMV BLD AUTO: 11.2 FL (ref 9.2–11.8)
POTASSIUM SERPL-SCNC: 4.1 MMOL/L (ref 3.5–5.5)
PROT SERPL-MCNC: 6.5 G/DL (ref 6.4–8.2)
RBC # BLD AUTO: 3.64 M/UL (ref 4.35–5.65)
SODIUM SERPL-SCNC: 137 MMOL/L (ref 136–145)
WBC # BLD AUTO: 4.4 K/UL (ref 4.6–13.2)

## 2023-04-08 PROCEDURE — 1100000003 HC PRIVATE W/ TELEMETRY

## 2023-04-08 PROCEDURE — 80053 COMPREHEN METABOLIC PANEL: CPT

## 2023-04-08 PROCEDURE — 85025 COMPLETE CBC W/AUTO DIFF WBC: CPT

## 2023-04-08 PROCEDURE — 6370000000 HC RX 637 (ALT 250 FOR IP): Performed by: HOSPITALIST

## 2023-04-08 PROCEDURE — 36415 COLL VENOUS BLD VENIPUNCTURE: CPT

## 2023-04-08 PROCEDURE — 6370000000 HC RX 637 (ALT 250 FOR IP): Performed by: STUDENT IN AN ORGANIZED HEALTH CARE EDUCATION/TRAINING PROGRAM

## 2023-04-08 PROCEDURE — 82962 GLUCOSE BLOOD TEST: CPT

## 2023-04-08 RX ADMIN — DOXAZOSIN 2 MG: 1 TABLET ORAL at 08:55

## 2023-04-08 RX ADMIN — SEVELAMER CARBONATE 800 MG: 800 TABLET, FILM COATED ORAL at 12:03

## 2023-04-08 RX ADMIN — LOSARTAN POTASSIUM 100 MG: 50 TABLET, FILM COATED ORAL at 08:55

## 2023-04-08 RX ADMIN — SPIRONOLACTONE 100 MG: 100 TABLET ORAL at 08:55

## 2023-04-08 RX ADMIN — CALCITRIOL 0.5 MCG: 0.25 CAPSULE ORAL at 08:54

## 2023-04-08 RX ADMIN — ASPIRIN 81 MG: 81 TABLET, COATED ORAL at 08:54

## 2023-04-08 RX ADMIN — ISOSORBIDE MONONITRATE 60 MG: 60 TABLET, EXTENDED RELEASE ORAL at 08:53

## 2023-04-08 RX ADMIN — CYANOCOBALAMIN TAB 1000 MCG 1000 MCG: 1000 TAB at 08:55

## 2023-04-08 RX ADMIN — AMLODIPINE BESYLATE 10 MG: 5 TABLET ORAL at 08:53

## 2023-04-08 RX ADMIN — PANTOPRAZOLE SODIUM 40 MG: 40 TABLET, DELAYED RELEASE ORAL at 06:02

## 2023-04-09 LAB
GLUCOSE BLD STRIP.AUTO-MCNC: 102 MG/DL (ref 70–110)
GLUCOSE BLD STRIP.AUTO-MCNC: 103 MG/DL (ref 70–110)
GLUCOSE BLD STRIP.AUTO-MCNC: 84 MG/DL (ref 70–110)
GLUCOSE BLD STRIP.AUTO-MCNC: 96 MG/DL (ref 70–110)

## 2023-04-09 PROCEDURE — 6370000000 HC RX 637 (ALT 250 FOR IP): Performed by: FAMILY MEDICINE

## 2023-04-09 PROCEDURE — 6370000000 HC RX 637 (ALT 250 FOR IP): Performed by: STUDENT IN AN ORGANIZED HEALTH CARE EDUCATION/TRAINING PROGRAM

## 2023-04-09 PROCEDURE — 1100000003 HC PRIVATE W/ TELEMETRY

## 2023-04-09 PROCEDURE — 6370000000 HC RX 637 (ALT 250 FOR IP): Performed by: HOSPITALIST

## 2023-04-09 PROCEDURE — 82962 GLUCOSE BLOOD TEST: CPT

## 2023-04-09 RX ORDER — LOPERAMIDE HYDROCHLORIDE 2 MG/1
2 CAPSULE ORAL 4 TIMES DAILY PRN
Status: DISCONTINUED | OUTPATIENT
Start: 2023-04-09 | End: 2023-04-15 | Stop reason: HOSPADM

## 2023-04-09 RX ORDER — SACCHAROMYCES BOULARDII 250 MG
250 CAPSULE ORAL 2 TIMES DAILY
Status: DISCONTINUED | OUTPATIENT
Start: 2023-04-09 | End: 2023-04-15 | Stop reason: HOSPADM

## 2023-04-09 RX ADMIN — SPIRONOLACTONE 100 MG: 100 TABLET ORAL at 09:24

## 2023-04-09 RX ADMIN — DOXAZOSIN 2 MG: 1 TABLET ORAL at 09:25

## 2023-04-09 RX ADMIN — Medication 250 MG: at 20:18

## 2023-04-09 RX ADMIN — CALCITRIOL 0.5 MCG: 0.25 CAPSULE ORAL at 09:25

## 2023-04-09 RX ADMIN — Medication 250 MG: at 10:30

## 2023-04-09 RX ADMIN — LOPERAMIDE HYDROCHLORIDE 2 MG: 2 CAPSULE ORAL at 10:30

## 2023-04-09 RX ADMIN — CYANOCOBALAMIN TAB 1000 MCG 1000 MCG: 1000 TAB at 09:25

## 2023-04-09 RX ADMIN — SEVELAMER CARBONATE 800 MG: 800 TABLET, FILM COATED ORAL at 09:24

## 2023-04-09 RX ADMIN — ISOSORBIDE MONONITRATE 60 MG: 60 TABLET, EXTENDED RELEASE ORAL at 09:24

## 2023-04-09 RX ADMIN — LOSARTAN POTASSIUM 100 MG: 50 TABLET, FILM COATED ORAL at 09:24

## 2023-04-09 RX ADMIN — ASPIRIN 81 MG: 81 TABLET, COATED ORAL at 09:25

## 2023-04-09 RX ADMIN — AMLODIPINE BESYLATE 10 MG: 5 TABLET ORAL at 09:25

## 2023-04-09 RX ADMIN — PANTOPRAZOLE SODIUM 40 MG: 40 TABLET, DELAYED RELEASE ORAL at 06:38

## 2023-04-09 ASSESSMENT — PAIN SCALES - GENERAL
PAINLEVEL_OUTOF10: 0

## 2023-04-10 ENCOUNTER — APPOINTMENT (OUTPATIENT)
Facility: HOSPITAL | Age: 53
DRG: 291 | End: 2023-04-10
Payer: MEDICARE

## 2023-04-10 LAB
ANION GAP SERPL CALC-SCNC: 8 MMOL/L (ref 3–18)
BUN SERPL-MCNC: 74 MG/DL (ref 7–18)
BUN/CREAT SERPL: 10 (ref 12–20)
CALCIUM SERPL-MCNC: 7.8 MG/DL (ref 8.5–10.1)
CHLORIDE SERPL-SCNC: 106 MMOL/L (ref 100–111)
CO2 SERPL-SCNC: 21 MMOL/L (ref 21–32)
CREAT SERPL-MCNC: 7.53 MG/DL (ref 0.6–1.3)
GLUCOSE BLD STRIP.AUTO-MCNC: 102 MG/DL (ref 70–110)
GLUCOSE BLD STRIP.AUTO-MCNC: 127 MG/DL (ref 70–110)
GLUCOSE BLD STRIP.AUTO-MCNC: 72 MG/DL (ref 70–110)
GLUCOSE BLD STRIP.AUTO-MCNC: 90 MG/DL (ref 70–110)
GLUCOSE SERPL-MCNC: 78 MG/DL (ref 74–99)
HCT VFR BLD AUTO: 31.9 % (ref 36–48)
HGB BLD-MCNC: 10.3 G/DL (ref 13–16)
PHOSPHATE SERPL-MCNC: 5.8 MG/DL (ref 2.5–4.9)
POTASSIUM SERPL-SCNC: 5.6 MMOL/L (ref 3.5–5.5)
SODIUM SERPL-SCNC: 135 MMOL/L (ref 136–145)

## 2023-04-10 PROCEDURE — 36415 COLL VENOUS BLD VENIPUNCTURE: CPT

## 2023-04-10 PROCEDURE — 80048 BASIC METABOLIC PNL TOTAL CA: CPT

## 2023-04-10 PROCEDURE — 6370000000 HC RX 637 (ALT 250 FOR IP): Performed by: FAMILY MEDICINE

## 2023-04-10 PROCEDURE — 84100 ASSAY OF PHOSPHORUS: CPT

## 2023-04-10 PROCEDURE — 74176 CT ABD & PELVIS W/O CONTRAST: CPT

## 2023-04-10 PROCEDURE — 97602 WOUND(S) CARE NON-SELECTIVE: CPT

## 2023-04-10 PROCEDURE — 1100000003 HC PRIVATE W/ TELEMETRY

## 2023-04-10 PROCEDURE — 90935 HEMODIALYSIS ONE EVALUATION: CPT

## 2023-04-10 PROCEDURE — 82962 GLUCOSE BLOOD TEST: CPT

## 2023-04-10 PROCEDURE — 6370000000 HC RX 637 (ALT 250 FOR IP): Performed by: HOSPITALIST

## 2023-04-10 PROCEDURE — 6370000000 HC RX 637 (ALT 250 FOR IP): Performed by: STUDENT IN AN ORGANIZED HEALTH CARE EDUCATION/TRAINING PROGRAM

## 2023-04-10 PROCEDURE — 85014 HEMATOCRIT: CPT

## 2023-04-10 RX ORDER — METRONIDAZOLE 250 MG/1
500 TABLET ORAL EVERY 8 HOURS SCHEDULED
Status: DISCONTINUED | OUTPATIENT
Start: 2023-04-10 | End: 2023-04-15 | Stop reason: HOSPADM

## 2023-04-10 RX ADMIN — PANTOPRAZOLE SODIUM 40 MG: 40 TABLET, DELAYED RELEASE ORAL at 06:13

## 2023-04-10 RX ADMIN — LOPERAMIDE HYDROCHLORIDE 2 MG: 2 CAPSULE ORAL at 18:06

## 2023-04-10 RX ADMIN — CYANOCOBALAMIN TAB 1000 MCG 1000 MCG: 1000 TAB at 09:37

## 2023-04-10 RX ADMIN — METRONIDAZOLE 500 MG: 250 TABLET ORAL at 21:03

## 2023-04-10 RX ADMIN — Medication 250 MG: at 09:36

## 2023-04-10 RX ADMIN — CALCITRIOL 0.5 MCG: 0.25 CAPSULE ORAL at 09:36

## 2023-04-10 RX ADMIN — Medication 250 MG: at 20:08

## 2023-04-10 RX ADMIN — SPIRONOLACTONE 100 MG: 100 TABLET ORAL at 09:37

## 2023-04-10 RX ADMIN — ACETAMINOPHEN 650 MG: 325 TABLET ORAL at 21:46

## 2023-04-10 RX ADMIN — ISOSORBIDE MONONITRATE 60 MG: 60 TABLET, EXTENDED RELEASE ORAL at 09:36

## 2023-04-10 RX ADMIN — AMLODIPINE BESYLATE 10 MG: 5 TABLET ORAL at 09:37

## 2023-04-10 RX ADMIN — LOPERAMIDE HYDROCHLORIDE 2 MG: 2 CAPSULE ORAL at 21:03

## 2023-04-10 RX ADMIN — ACETAMINOPHEN 650 MG: 325 TABLET ORAL at 18:09

## 2023-04-10 RX ADMIN — SEVELAMER CARBONATE 800 MG: 800 TABLET, FILM COATED ORAL at 09:37

## 2023-04-10 RX ADMIN — LOPERAMIDE HYDROCHLORIDE 2 MG: 2 CAPSULE ORAL at 12:49

## 2023-04-10 RX ADMIN — LOSARTAN POTASSIUM 100 MG: 50 TABLET, FILM COATED ORAL at 09:37

## 2023-04-10 RX ADMIN — METRONIDAZOLE 500 MG: 250 TABLET ORAL at 15:05

## 2023-04-10 RX ADMIN — ASPIRIN 81 MG: 81 TABLET, COATED ORAL at 09:36

## 2023-04-10 RX ADMIN — DOXAZOSIN 2 MG: 1 TABLET ORAL at 09:36

## 2023-04-10 ASSESSMENT — PAIN DESCRIPTION - DESCRIPTORS: DESCRIPTORS: ACHING;CRAMPING

## 2023-04-10 ASSESSMENT — PAIN SCALES - GENERAL
PAINLEVEL_OUTOF10: 0
PAINLEVEL_OUTOF10: 3

## 2023-04-10 ASSESSMENT — PAIN DESCRIPTION - ORIENTATION: ORIENTATION: MID

## 2023-04-10 ASSESSMENT — PAIN DESCRIPTION - LOCATION: LOCATION: ABDOMEN

## 2023-04-10 ASSESSMENT — PAIN DESCRIPTION - FREQUENCY: FREQUENCY: CONTINUOUS

## 2023-04-10 NOTE — WOUND CARE
able to:   [x] Indicates understanding       [] Needs reinforcement  [] Unsuccessful      [] Verbal Understanding  [] Demonstrated understanding       [] No evidence of learning  [] Refused teaching         [] N/A       Electronically signed by Archnaa Tolbert RN on 4/10/2023 at 12:30 PM

## 2023-04-10 NOTE — DIALYSIS
04/10/23  Dialysis treatment    Treatment time-3hrs  Fluid removed- 2kg  BVP-64.6  Medications given-none  Hepatitis Status-4/6/23 NEG AG, SUCC AB  Diagnosis-ESRD    Access-  LT CVC permcath- dressing changed since it was loose. No s/s of infection noted. Ports cleaned, blood aspirated and lines flushed without any issues. Treatment-    1010 Dialysis started  1310 Dialysis completed and blood rinsed back.  New caps placed on each port, pt stable    Pre and Post Report given  Brody Pa RN

## 2023-04-10 NOTE — PLAN OF CARE
The patient is progressing toward the following goals.      Problem: Discharge Planning  Goal: Discharge to home or other facility with appropriate resources  Outcome: Progressing  Flowsheets (Taken 4/10/2023 0936)  Discharge to home or other facility with appropriate resources: Identify barriers to discharge with patient and caregiver     Problem: Chronic Conditions and Co-morbidities  Goal: Patient's chronic conditions and co-morbidity symptoms are monitored and maintained or improved  Outcome: Progressing  Flowsheets (Taken 4/10/2023 0936)  Care Plan - Patient's Chronic Conditions and Co-Morbidity Symptoms are Monitored and Maintained or Improved: Monitor and assess patient's chronic conditions and comorbid symptoms for stability, deterioration, or improvement     Problem: Skin/Tissue Integrity  Goal: Absence of new skin breakdown  Description: 1.  Monitor for areas of redness and/or skin breakdown  2.  Assess vascular access sites hourly  3.  Every 4-6 hours minimum:  Change oxygen saturation probe site  4.  Every 4-6 hours:  If on nasal continuous positive airway pressure, respiratory therapy assess nares and determine need for appliance change or resting period.  Outcome: Progressing     Problem: Safety - Adult  Goal: Free from fall injury  Outcome: Progressing

## 2023-04-11 LAB
ANION GAP SERPL CALC-SCNC: 6 MMOL/L (ref 3–18)
BUN SERPL-MCNC: 69 MG/DL (ref 7–18)
BUN/CREAT SERPL: 11 (ref 12–20)
CALCIUM SERPL-MCNC: 7.6 MG/DL (ref 8.5–10.1)
CHLORIDE SERPL-SCNC: 108 MMOL/L (ref 100–111)
CO2 SERPL-SCNC: 24 MMOL/L (ref 21–32)
CREAT SERPL-MCNC: 6.45 MG/DL (ref 0.6–1.3)
ERYTHROCYTE [DISTWIDTH] IN BLOOD BY AUTOMATED COUNT: 19.9 % (ref 11.6–14.5)
GLUCOSE BLD STRIP.AUTO-MCNC: 106 MG/DL (ref 70–110)
GLUCOSE BLD STRIP.AUTO-MCNC: 118 MG/DL (ref 70–110)
GLUCOSE BLD STRIP.AUTO-MCNC: 122 MG/DL (ref 70–110)
GLUCOSE BLD STRIP.AUTO-MCNC: 96 MG/DL (ref 70–110)
GLUCOSE SERPL-MCNC: 73 MG/DL (ref 74–99)
HCT VFR BLD AUTO: 31.8 % (ref 36–48)
HGB BLD-MCNC: 10.3 G/DL (ref 13–16)
MCH RBC QN AUTO: 27.1 PG (ref 24–34)
MCHC RBC AUTO-ENTMCNC: 32.4 G/DL (ref 31–37)
MCV RBC AUTO: 83.7 FL (ref 78–100)
NRBC # BLD: 0 K/UL (ref 0–0.01)
NRBC BLD-RTO: 0 PER 100 WBC
PLATELET # BLD AUTO: 206 K/UL (ref 135–420)
PMV BLD AUTO: 10.1 FL (ref 9.2–11.8)
POTASSIUM SERPL-SCNC: 5.4 MMOL/L (ref 3.5–5.5)
RBC # BLD AUTO: 3.8 M/UL (ref 4.35–5.65)
SODIUM SERPL-SCNC: 138 MMOL/L (ref 136–145)
WBC # BLD AUTO: 6.1 K/UL (ref 4.6–13.2)

## 2023-04-11 PROCEDURE — 36415 COLL VENOUS BLD VENIPUNCTURE: CPT

## 2023-04-11 PROCEDURE — 80048 BASIC METABOLIC PNL TOTAL CA: CPT

## 2023-04-11 PROCEDURE — 6370000000 HC RX 637 (ALT 250 FOR IP): Performed by: FAMILY MEDICINE

## 2023-04-11 PROCEDURE — 82962 GLUCOSE BLOOD TEST: CPT

## 2023-04-11 PROCEDURE — 85027 COMPLETE CBC AUTOMATED: CPT

## 2023-04-11 PROCEDURE — 6370000000 HC RX 637 (ALT 250 FOR IP): Performed by: HOSPITALIST

## 2023-04-11 PROCEDURE — 1100000003 HC PRIVATE W/ TELEMETRY

## 2023-04-11 RX ADMIN — LOPERAMIDE HYDROCHLORIDE 2 MG: 2 CAPSULE ORAL at 08:37

## 2023-04-11 RX ADMIN — LOPERAMIDE HYDROCHLORIDE 2 MG: 2 CAPSULE ORAL at 03:13

## 2023-04-11 RX ADMIN — Medication 250 MG: at 21:23

## 2023-04-11 RX ADMIN — METRONIDAZOLE 500 MG: 250 TABLET ORAL at 06:22

## 2023-04-11 RX ADMIN — Medication 250 MG: at 08:38

## 2023-04-11 RX ADMIN — PANTOPRAZOLE SODIUM 40 MG: 40 TABLET, DELAYED RELEASE ORAL at 06:22

## 2023-04-11 RX ADMIN — ISOSORBIDE MONONITRATE 60 MG: 60 TABLET, EXTENDED RELEASE ORAL at 08:38

## 2023-04-11 RX ADMIN — AMLODIPINE BESYLATE 10 MG: 5 TABLET ORAL at 08:38

## 2023-04-11 RX ADMIN — LOPERAMIDE HYDROCHLORIDE 2 MG: 2 CAPSULE ORAL at 19:29

## 2023-04-11 RX ADMIN — CYANOCOBALAMIN TAB 1000 MCG 1000 MCG: 1000 TAB at 08:38

## 2023-04-11 RX ADMIN — DOXAZOSIN 2 MG: 1 TABLET ORAL at 08:38

## 2023-04-11 RX ADMIN — METRONIDAZOLE 500 MG: 250 TABLET ORAL at 14:15

## 2023-04-11 RX ADMIN — LOSARTAN POTASSIUM 100 MG: 50 TABLET, FILM COATED ORAL at 08:37

## 2023-04-11 RX ADMIN — SPIRONOLACTONE 100 MG: 100 TABLET ORAL at 08:38

## 2023-04-11 RX ADMIN — METRONIDAZOLE 500 MG: 250 TABLET ORAL at 21:23

## 2023-04-11 RX ADMIN — LOPERAMIDE HYDROCHLORIDE 2 MG: 2 CAPSULE ORAL at 14:15

## 2023-04-11 RX ADMIN — ASPIRIN 81 MG: 81 TABLET, COATED ORAL at 08:38

## 2023-04-11 NOTE — PLAN OF CARE
Problem: Discharge Planning  Goal: Discharge to home or other facility with appropriate resources  Outcome: Progressing  Flowsheets (Taken 4/11/2023 0830)  Discharge to home or other facility with appropriate resources: Identify barriers to discharge with patient and caregiver     Problem: Pain  Goal: Verbalizes/displays adequate comfort level or baseline comfort level  Outcome: Progressing     Problem: Chronic Conditions and Co-morbidities  Goal: Patient's chronic conditions and co-morbidity symptoms are monitored and maintained or improved  Outcome: Progressing  Flowsheets (Taken 4/11/2023 0830)  Care Plan - Patient's Chronic Conditions and Co-Morbidity Symptoms are Monitored and Maintained or Improved: Monitor and assess patient's chronic conditions and comorbid symptoms for stability, deterioration, or improvement     Problem: Skin/Tissue Integrity  Goal: Absence of new skin breakdown  Description: 1. Monitor for areas of redness and/or skin breakdown  2. Assess vascular access sites hourly  3. Every 4-6 hours minimum:  Change oxygen saturation probe site  4. Every 4-6 hours:  If on nasal continuous positive airway pressure, respiratory therapy assess nares and determine need for appliance change or resting period.   Outcome: Progressing     Problem: Safety - Adult  Goal: Free from fall injury  Outcome: Progressing

## 2023-04-12 LAB
ANION GAP SERPL CALC-SCNC: 6 MMOL/L (ref 3–18)
BUN SERPL-MCNC: 82 MG/DL (ref 7–18)
BUN/CREAT SERPL: 12 (ref 12–20)
CALCIUM SERPL-MCNC: 7.7 MG/DL (ref 8.5–10.1)
CHLORIDE SERPL-SCNC: 108 MMOL/L (ref 100–111)
CO2 SERPL-SCNC: 22 MMOL/L (ref 21–32)
CREAT SERPL-MCNC: 7.1 MG/DL (ref 0.6–1.3)
ERYTHROCYTE [DISTWIDTH] IN BLOOD BY AUTOMATED COUNT: 19.7 % (ref 11.6–14.5)
GLUCOSE BLD STRIP.AUTO-MCNC: 102 MG/DL (ref 70–110)
GLUCOSE BLD STRIP.AUTO-MCNC: 113 MG/DL (ref 70–110)
GLUCOSE BLD STRIP.AUTO-MCNC: 116 MG/DL (ref 70–110)
GLUCOSE BLD STRIP.AUTO-MCNC: 124 MG/DL (ref 70–110)
GLUCOSE BLD STRIP.AUTO-MCNC: 127 MG/DL (ref 70–110)
GLUCOSE BLD STRIP.AUTO-MCNC: 133 MG/DL (ref 70–110)
GLUCOSE BLD STRIP.AUTO-MCNC: 136 MG/DL (ref 70–110)
GLUCOSE BLD STRIP.AUTO-MCNC: 144 MG/DL (ref 70–110)
GLUCOSE BLD STRIP.AUTO-MCNC: 50 MG/DL (ref 70–110)
GLUCOSE BLD STRIP.AUTO-MCNC: 57 MG/DL (ref 70–110)
GLUCOSE BLD STRIP.AUTO-MCNC: 60 MG/DL (ref 70–110)
GLUCOSE BLD STRIP.AUTO-MCNC: 76 MG/DL (ref 70–110)
GLUCOSE BLD STRIP.AUTO-MCNC: 87 MG/DL (ref 70–110)
GLUCOSE BLD STRIP.AUTO-MCNC: 90 MG/DL (ref 70–110)
GLUCOSE SERPL-MCNC: 90 MG/DL (ref 74–99)
HCT VFR BLD AUTO: 31 % (ref 36–48)
HGB BLD-MCNC: 10.2 G/DL (ref 13–16)
MCH RBC QN AUTO: 27.3 PG (ref 24–34)
MCHC RBC AUTO-ENTMCNC: 32.9 G/DL (ref 31–37)
MCV RBC AUTO: 82.9 FL (ref 78–100)
NRBC # BLD: 0 K/UL (ref 0–0.01)
NRBC BLD-RTO: 0 PER 100 WBC
PLATELET # BLD AUTO: 234 K/UL (ref 135–420)
PMV BLD AUTO: 11.3 FL (ref 9.2–11.8)
POTASSIUM SERPL-SCNC: 6.2 MMOL/L (ref 3.5–5.5)
RBC # BLD AUTO: 3.74 M/UL (ref 4.35–5.65)
SODIUM SERPL-SCNC: 136 MMOL/L (ref 136–145)
WBC # BLD AUTO: 5.4 K/UL (ref 4.6–13.2)

## 2023-04-12 PROCEDURE — 6360000002 HC RX W HCPCS: Performed by: HOSPITALIST

## 2023-04-12 PROCEDURE — 85027 COMPLETE CBC AUTOMATED: CPT

## 2023-04-12 PROCEDURE — 2500000003 HC RX 250 WO HCPCS: Performed by: FAMILY MEDICINE

## 2023-04-12 PROCEDURE — 6360000002 HC RX W HCPCS: Performed by: FAMILY MEDICINE

## 2023-04-12 PROCEDURE — 2580000003 HC RX 258: Performed by: STUDENT IN AN ORGANIZED HEALTH CARE EDUCATION/TRAINING PROGRAM

## 2023-04-12 PROCEDURE — 80048 BASIC METABOLIC PNL TOTAL CA: CPT

## 2023-04-12 PROCEDURE — 6370000000 HC RX 637 (ALT 250 FOR IP): Performed by: FAMILY MEDICINE

## 2023-04-12 PROCEDURE — 36415 COLL VENOUS BLD VENIPUNCTURE: CPT

## 2023-04-12 PROCEDURE — 2580000003 HC RX 258: Performed by: INTERNAL MEDICINE

## 2023-04-12 PROCEDURE — 94640 AIRWAY INHALATION TREATMENT: CPT

## 2023-04-12 PROCEDURE — 2580000003 HC RX 258: Performed by: FAMILY MEDICINE

## 2023-04-12 PROCEDURE — 6370000000 HC RX 637 (ALT 250 FOR IP): Performed by: HOSPITALIST

## 2023-04-12 PROCEDURE — 1100000003 HC PRIVATE W/ TELEMETRY

## 2023-04-12 PROCEDURE — 82962 GLUCOSE BLOOD TEST: CPT

## 2023-04-12 RX ORDER — HEPARIN SODIUM 5000 [USP'U]/ML
5000 INJECTION, SOLUTION INTRAVENOUS; SUBCUTANEOUS EVERY 8 HOURS SCHEDULED
Status: DISCONTINUED | OUTPATIENT
Start: 2023-04-12 | End: 2023-04-15 | Stop reason: HOSPADM

## 2023-04-12 RX ORDER — DEXTROSE MONOHYDRATE 100 MG/ML
INJECTION, SOLUTION INTRAVENOUS CONTINUOUS PRN
Status: DISCONTINUED | OUTPATIENT
Start: 2023-04-12 | End: 2023-04-15 | Stop reason: HOSPADM

## 2023-04-12 RX ORDER — CALCIUM GLUCONATE 94 MG/ML
1000 INJECTION, SOLUTION INTRAVENOUS ONCE
Status: DISCONTINUED | OUTPATIENT
Start: 2023-04-12 | End: 2023-04-12 | Stop reason: ALTCHOICE

## 2023-04-12 RX ORDER — DEXTROSE MONOHYDRATE 100 MG/ML
INJECTION, SOLUTION INTRAVENOUS CONTINUOUS
Status: DISCONTINUED | OUTPATIENT
Start: 2023-04-12 | End: 2023-04-13

## 2023-04-12 RX ORDER — CALCIUM GLUCONATE 20 MG/ML
1000 INJECTION, SOLUTION INTRAVENOUS ONCE
Status: COMPLETED | OUTPATIENT
Start: 2023-04-12 | End: 2023-04-12

## 2023-04-12 RX ADMIN — ISOSORBIDE MONONITRATE 60 MG: 60 TABLET, EXTENDED RELEASE ORAL at 08:18

## 2023-04-12 RX ADMIN — DEXTROSE MONOHYDRATE: 100 INJECTION, SOLUTION INTRAVENOUS at 10:39

## 2023-04-12 RX ADMIN — AMLODIPINE BESYLATE 10 MG: 5 TABLET ORAL at 08:19

## 2023-04-12 RX ADMIN — METRONIDAZOLE 500 MG: 250 TABLET ORAL at 20:18

## 2023-04-12 RX ADMIN — ALBUTEROL SULFATE 10 MG: 2.5 SOLUTION RESPIRATORY (INHALATION) at 06:04

## 2023-04-12 RX ADMIN — DEXTROSE MONOHYDRATE 250 ML: 100 INJECTION, SOLUTION INTRAVENOUS at 09:01

## 2023-04-12 RX ADMIN — DEXTROSE MONOHYDRATE 250 ML: 100 INJECTION, SOLUTION INTRAVENOUS at 10:24

## 2023-04-12 RX ADMIN — CALCIUM GLUCONATE 1000 MG: 20 INJECTION, SOLUTION INTRAVENOUS at 07:42

## 2023-04-12 RX ADMIN — METRONIDAZOLE 500 MG: 250 TABLET ORAL at 06:15

## 2023-04-12 RX ADMIN — INSULIN HUMAN 10 UNITS: 100 INJECTION, SOLUTION PARENTERAL at 07:39

## 2023-04-12 RX ADMIN — LOPERAMIDE HYDROCHLORIDE 2 MG: 2 CAPSULE ORAL at 08:19

## 2023-04-12 RX ADMIN — SODIUM BICARBONATE 50 MEQ: 84 INJECTION, SOLUTION INTRAVENOUS at 07:44

## 2023-04-12 RX ADMIN — DOXAZOSIN 2 MG: 1 TABLET ORAL at 08:19

## 2023-04-12 RX ADMIN — PANTOPRAZOLE SODIUM 40 MG: 40 TABLET, DELAYED RELEASE ORAL at 06:15

## 2023-04-12 RX ADMIN — Medication 250 MG: at 08:19

## 2023-04-12 RX ADMIN — Medication 250 MG: at 20:14

## 2023-04-12 RX ADMIN — DEXTROSE MONOHYDRATE 250 ML: 100 INJECTION, SOLUTION INTRAVENOUS at 06:23

## 2023-04-12 RX ADMIN — LOSARTAN POTASSIUM 100 MG: 50 TABLET, FILM COATED ORAL at 08:19

## 2023-04-12 RX ADMIN — HEPARIN SODIUM 5000 UNITS: 5000 INJECTION INTRAVENOUS; SUBCUTANEOUS at 20:18

## 2023-04-12 RX ADMIN — CYANOCOBALAMIN TAB 1000 MCG 1000 MCG: 1000 TAB at 08:19

## 2023-04-12 RX ADMIN — ASPIRIN 81 MG: 81 TABLET, COATED ORAL at 08:19

## 2023-04-12 RX ADMIN — SPIRONOLACTONE 100 MG: 100 TABLET ORAL at 08:19

## 2023-04-12 NOTE — DIALYSIS
04/12/23  Dialysis treatment    Treatment time-3.5hrs  Fluid removed-1.5kg  BVP-69.4  Medications given-none  Hepatitis Status-4/6/23 NEG AG SUCC AB  Diagnosis-Uremia      Access-  Lt CVC perma-cath. Dressing c/d/I from 4/10/23. Ports cleaned, blood aspirated and lines flushed without any issues. Treatment-    3953 Dialysis started  1500 Pt feeling bad- lightheaded and dizzy, glucose checked- 133. B/p wnl, UF turned off  1541 Dialysis completed and blood rinsed back.  New caps placed on each port, pt stable    Pre and Post Report given  Oneida arana RN

## 2023-04-12 NOTE — PLAN OF CARE
Problem: Discharge Planning  Goal: Discharge to home or other facility with appropriate resources  Outcome: Progressing  Flowsheets (Taken 4/12/2023 0800)  Discharge to home or other facility with appropriate resources: Identify barriers to discharge with patient and caregiver     Problem: Pain  Goal: Verbalizes/displays adequate comfort level or baseline comfort level  Outcome: Progressing     Problem: Chronic Conditions and Co-morbidities  Goal: Patient's chronic conditions and co-morbidity symptoms are monitored and maintained or improved  Outcome: Progressing  Flowsheets (Taken 4/12/2023 0800)  Care Plan - Patient's Chronic Conditions and Co-Morbidity Symptoms are Monitored and Maintained or Improved: Monitor and assess patient's chronic conditions and comorbid symptoms for stability, deterioration, or improvement     Problem: Skin/Tissue Integrity  Goal: Absence of new skin breakdown  Description: 1. Monitor for areas of redness and/or skin breakdown  2. Assess vascular access sites hourly  3. Every 4-6 hours minimum:  Change oxygen saturation probe site  4. Every 4-6 hours:  If on nasal continuous positive airway pressure, respiratory therapy assess nares and determine need for appliance change or resting period.   Outcome: Progressing     Problem: Safety - Adult  Goal: Free from fall injury  Outcome: Progressing

## 2023-04-13 LAB
ANION GAP SERPL CALC-SCNC: 6 MMOL/L (ref 3–18)
BUN SERPL-MCNC: 56 MG/DL (ref 7–18)
BUN/CREAT SERPL: 10 (ref 12–20)
CALCIUM SERPL-MCNC: 7.5 MG/DL (ref 8.5–10.1)
CHLORIDE SERPL-SCNC: 101 MMOL/L (ref 100–111)
CO2 SERPL-SCNC: 26 MMOL/L (ref 21–32)
CREAT SERPL-MCNC: 5.55 MG/DL (ref 0.6–1.3)
ERYTHROCYTE [DISTWIDTH] IN BLOOD BY AUTOMATED COUNT: 19.5 % (ref 11.6–14.5)
GLUCOSE BLD STRIP.AUTO-MCNC: 110 MG/DL (ref 70–110)
GLUCOSE BLD STRIP.AUTO-MCNC: 86 MG/DL (ref 70–110)
GLUCOSE BLD STRIP.AUTO-MCNC: 96 MG/DL (ref 70–110)
GLUCOSE BLD STRIP.AUTO-MCNC: 99 MG/DL (ref 70–110)
GLUCOSE SERPL-MCNC: 96 MG/DL (ref 74–99)
HCT VFR BLD AUTO: 30 % (ref 36–48)
HGB BLD-MCNC: 10.1 G/DL (ref 13–16)
MCH RBC QN AUTO: 27.9 PG (ref 24–34)
MCHC RBC AUTO-ENTMCNC: 33.7 G/DL (ref 31–37)
MCV RBC AUTO: 82.9 FL (ref 78–100)
NRBC # BLD: 0 K/UL (ref 0–0.01)
NRBC BLD-RTO: 0 PER 100 WBC
PLATELET # BLD AUTO: 214 K/UL (ref 135–420)
PMV BLD AUTO: 10.6 FL (ref 9.2–11.8)
POTASSIUM SERPL-SCNC: 5.6 MMOL/L (ref 3.5–5.5)
RBC # BLD AUTO: 3.62 M/UL (ref 4.35–5.65)
SODIUM SERPL-SCNC: 133 MMOL/L (ref 136–145)
WBC # BLD AUTO: 5 K/UL (ref 4.6–13.2)

## 2023-04-13 PROCEDURE — 1100000003 HC PRIVATE W/ TELEMETRY

## 2023-04-13 PROCEDURE — 6370000000 HC RX 637 (ALT 250 FOR IP): Performed by: HOSPITALIST

## 2023-04-13 PROCEDURE — 82962 GLUCOSE BLOOD TEST: CPT

## 2023-04-13 PROCEDURE — 2580000003 HC RX 258: Performed by: STUDENT IN AN ORGANIZED HEALTH CARE EDUCATION/TRAINING PROGRAM

## 2023-04-13 PROCEDURE — 36415 COLL VENOUS BLD VENIPUNCTURE: CPT

## 2023-04-13 PROCEDURE — 85027 COMPLETE CBC AUTOMATED: CPT

## 2023-04-13 PROCEDURE — 6360000002 HC RX W HCPCS: Performed by: HOSPITALIST

## 2023-04-13 PROCEDURE — 6370000000 HC RX 637 (ALT 250 FOR IP): Performed by: FAMILY MEDICINE

## 2023-04-13 PROCEDURE — 6370000000 HC RX 637 (ALT 250 FOR IP): Performed by: STUDENT IN AN ORGANIZED HEALTH CARE EDUCATION/TRAINING PROGRAM

## 2023-04-13 PROCEDURE — 80048 BASIC METABOLIC PNL TOTAL CA: CPT

## 2023-04-13 RX ORDER — CLONIDINE 0.2 MG/24H
1 PATCH, EXTENDED RELEASE TRANSDERMAL WEEKLY
Status: DISCONTINUED | OUTPATIENT
Start: 2023-04-13 | End: 2023-04-15 | Stop reason: HOSPADM

## 2023-04-13 RX ORDER — DOXAZOSIN MESYLATE 4 MG/1
4 TABLET ORAL DAILY
Status: DISCONTINUED | OUTPATIENT
Start: 2023-04-14 | End: 2023-04-15 | Stop reason: HOSPADM

## 2023-04-13 RX ADMIN — PANTOPRAZOLE SODIUM 40 MG: 40 TABLET, DELAYED RELEASE ORAL at 05:42

## 2023-04-13 RX ADMIN — DOXAZOSIN 2 MG: 1 TABLET ORAL at 08:37

## 2023-04-13 RX ADMIN — LOSARTAN POTASSIUM 100 MG: 50 TABLET, FILM COATED ORAL at 08:38

## 2023-04-13 RX ADMIN — ASPIRIN 81 MG: 81 TABLET, COATED ORAL at 08:38

## 2023-04-13 RX ADMIN — METRONIDAZOLE 500 MG: 250 TABLET ORAL at 05:42

## 2023-04-13 RX ADMIN — Medication 250 MG: at 08:38

## 2023-04-13 RX ADMIN — DEXTROSE MONOHYDRATE: 100 INJECTION, SOLUTION INTRAVENOUS at 00:19

## 2023-04-13 RX ADMIN — AMLODIPINE BESYLATE 10 MG: 5 TABLET ORAL at 08:37

## 2023-04-13 RX ADMIN — ISOSORBIDE MONONITRATE 60 MG: 60 TABLET, EXTENDED RELEASE ORAL at 08:38

## 2023-04-13 RX ADMIN — HEPARIN SODIUM 5000 UNITS: 5000 INJECTION INTRAVENOUS; SUBCUTANEOUS at 05:41

## 2023-04-13 RX ADMIN — METRONIDAZOLE 500 MG: 250 TABLET ORAL at 15:46

## 2023-04-13 RX ADMIN — HEPARIN SODIUM 5000 UNITS: 5000 INJECTION INTRAVENOUS; SUBCUTANEOUS at 15:47

## 2023-04-13 RX ADMIN — METRONIDAZOLE 500 MG: 250 TABLET ORAL at 20:23

## 2023-04-13 RX ADMIN — CYANOCOBALAMIN TAB 1000 MCG 1000 MCG: 1000 TAB at 08:38

## 2023-04-13 RX ADMIN — SPIRONOLACTONE 100 MG: 100 TABLET ORAL at 08:38

## 2023-04-13 RX ADMIN — HEPARIN SODIUM 5000 UNITS: 5000 INJECTION INTRAVENOUS; SUBCUTANEOUS at 20:23

## 2023-04-13 RX ADMIN — LOPERAMIDE HYDROCHLORIDE 2 MG: 2 CAPSULE ORAL at 00:19

## 2023-04-13 RX ADMIN — Medication 250 MG: at 20:23

## 2023-04-14 VITALS
SYSTOLIC BLOOD PRESSURE: 130 MMHG | HEART RATE: 104 BPM | HEIGHT: 67 IN | BODY MASS INDEX: 29.97 KG/M2 | TEMPERATURE: 98 F | RESPIRATION RATE: 18 BRPM | WEIGHT: 190.92 LBS | OXYGEN SATURATION: 100 % | DIASTOLIC BLOOD PRESSURE: 61 MMHG

## 2023-04-14 LAB
ANION GAP SERPL CALC-SCNC: 4 MMOL/L (ref 3–18)
ANION GAP SERPL CALC-SCNC: 5 MMOL/L (ref 3–18)
BUN SERPL-MCNC: 32 MG/DL (ref 7–18)
BUN SERPL-MCNC: 81 MG/DL (ref 7–18)
BUN/CREAT SERPL: 10 (ref 12–20)
BUN/CREAT SERPL: 11 (ref 12–20)
CALCIUM SERPL-MCNC: 7.2 MG/DL (ref 8.5–10.1)
CALCIUM SERPL-MCNC: 7.8 MG/DL (ref 8.5–10.1)
CHLORIDE SERPL-SCNC: 104 MMOL/L (ref 100–111)
CHLORIDE SERPL-SCNC: 104 MMOL/L (ref 100–111)
CO2 SERPL-SCNC: 25 MMOL/L (ref 21–32)
CO2 SERPL-SCNC: 29 MMOL/L (ref 21–32)
CREAT SERPL-MCNC: 3.28 MG/DL (ref 0.6–1.3)
CREAT SERPL-MCNC: 7.24 MG/DL (ref 0.6–1.3)
GLUCOSE BLD STRIP.AUTO-MCNC: 74 MG/DL (ref 70–110)
GLUCOSE BLD STRIP.AUTO-MCNC: 84 MG/DL (ref 70–110)
GLUCOSE BLD STRIP.AUTO-MCNC: 85 MG/DL (ref 70–110)
GLUCOSE SERPL-MCNC: 77 MG/DL (ref 74–99)
GLUCOSE SERPL-MCNC: 91 MG/DL (ref 74–99)
PHOSPHATE SERPL-MCNC: 7.2 MG/DL (ref 2.5–4.9)
POTASSIUM SERPL-SCNC: 4.1 MMOL/L (ref 3.5–5.5)
POTASSIUM SERPL-SCNC: 6.7 MMOL/L (ref 3.5–5.5)
SODIUM SERPL-SCNC: 134 MMOL/L (ref 136–145)
SODIUM SERPL-SCNC: 137 MMOL/L (ref 136–145)

## 2023-04-14 PROCEDURE — 80048 BASIC METABOLIC PNL TOTAL CA: CPT

## 2023-04-14 PROCEDURE — 6370000000 HC RX 637 (ALT 250 FOR IP): Performed by: FAMILY MEDICINE

## 2023-04-14 PROCEDURE — 84100 ASSAY OF PHOSPHORUS: CPT

## 2023-04-14 PROCEDURE — 1100000003 HC PRIVATE W/ TELEMETRY

## 2023-04-14 PROCEDURE — 6360000002 HC RX W HCPCS: Performed by: HOSPITALIST

## 2023-04-14 PROCEDURE — 6370000000 HC RX 637 (ALT 250 FOR IP): Performed by: HOSPITALIST

## 2023-04-14 PROCEDURE — 82962 GLUCOSE BLOOD TEST: CPT

## 2023-04-14 PROCEDURE — 90935 HEMODIALYSIS ONE EVALUATION: CPT

## 2023-04-14 PROCEDURE — 6370000000 HC RX 637 (ALT 250 FOR IP): Performed by: STUDENT IN AN ORGANIZED HEALTH CARE EDUCATION/TRAINING PROGRAM

## 2023-04-14 RX ORDER — HEPARIN SODIUM 1000 [USP'U]/ML
4600 INJECTION, SOLUTION INTRAVENOUS; SUBCUTANEOUS PRN
Status: DISCONTINUED | OUTPATIENT
Start: 2023-04-14 | End: 2023-04-15 | Stop reason: HOSPADM

## 2023-04-14 RX ORDER — LOPERAMIDE HYDROCHLORIDE 2 MG/1
2 CAPSULE ORAL 4 TIMES DAILY PRN
Qty: 15 CAPSULE | Refills: 0 | Status: ON HOLD | OUTPATIENT
Start: 2023-04-14 | End: 2023-04-24

## 2023-04-14 RX ORDER — SACCHAROMYCES BOULARDII 250 MG
250 CAPSULE ORAL 2 TIMES DAILY
Qty: 14 CAPSULE | Refills: 0 | Status: SHIPPED | OUTPATIENT
Start: 2023-04-14 | End: 2023-04-21

## 2023-04-14 RX ADMIN — HEPARIN SODIUM 5000 UNITS: 5000 INJECTION INTRAVENOUS; SUBCUTANEOUS at 05:31

## 2023-04-14 RX ADMIN — METRONIDAZOLE 500 MG: 250 TABLET ORAL at 17:57

## 2023-04-14 RX ADMIN — DOXAZOSIN 4 MG: 4 TABLET ORAL at 08:52

## 2023-04-14 RX ADMIN — LOPERAMIDE HYDROCHLORIDE 2 MG: 2 CAPSULE ORAL at 05:42

## 2023-04-14 RX ADMIN — ISOSORBIDE MONONITRATE 60 MG: 60 TABLET, EXTENDED RELEASE ORAL at 08:47

## 2023-04-14 RX ADMIN — ASPIRIN 81 MG: 81 TABLET, COATED ORAL at 08:47

## 2023-04-14 RX ADMIN — Medication 250 MG: at 08:46

## 2023-04-14 RX ADMIN — METRONIDAZOLE 500 MG: 250 TABLET ORAL at 05:31

## 2023-04-14 RX ADMIN — LOPERAMIDE HYDROCHLORIDE 2 MG: 2 CAPSULE ORAL at 09:06

## 2023-04-14 RX ADMIN — PANTOPRAZOLE SODIUM 40 MG: 40 TABLET, DELAYED RELEASE ORAL at 05:31

## 2023-04-14 RX ADMIN — CALCITRIOL 0.5 MCG: 0.25 CAPSULE ORAL at 08:46

## 2023-04-14 RX ADMIN — LOSARTAN POTASSIUM 100 MG: 50 TABLET, FILM COATED ORAL at 08:46

## 2023-04-14 RX ADMIN — CYANOCOBALAMIN TAB 1000 MCG 1000 MCG: 1000 TAB at 08:47

## 2023-04-14 RX ADMIN — SEVELAMER CARBONATE 800 MG: 800 TABLET, FILM COATED ORAL at 17:57

## 2023-04-14 RX ADMIN — AMLODIPINE BESYLATE 10 MG: 5 TABLET ORAL at 08:46

## 2023-04-14 NOTE — PROGRESS NOTES
1000 Last dressing change for L heel on 4/5 per patient. RN spoke to Tee hagen from wound care about supplies for dressing change. Wound care will perform dressing changes. Patient has an appointment with podiatrist on 4/11. RN will update wound care regarding d/c planning. [address] called [place], wound care nurse, to update her that patient will not be discharged by tomorrow unless diarrhea subsides, but no answer. RN left voicemail as a reminder for dressing change.
Comprehensive Nutrition Assessment    Type and Reason for Visit:  Initial, RD Nutrition Re-Screen/LOS    Nutrition Recommendations/Plan:   Continue w/ POC  Monitor % meals. Monitor need for ONS; recommend Nepro with Carb Steady (each provides 425 kcal, 19g protein) 1x daily  if needed. Malnutrition Assessment:  Malnutrition Status: At risk for malnutrition (increased needs r/t HD) (04/13/23 0283)      Nutrition Assessment:    NKFA. PMHx: ESRD, diabetes, CHF, sickle cell trait, BKA. Admitted with Encephalopathy (resolved), ESRD- HD, diarrhea, PVD. Medical noncompliance. Last dialysis 4/12. Wt hx reviewed: 165lb (2/6/23), 167lb (1/13/23), 169lb (10/4/22), 185lb (5/2022); no lost noted. Per documentation- good PO intake (%). Nutrition Related Findings:    Calcitriol, humalog, protonix, florastor, renvela, vit b12. Last lab 4/12- GFR 9, BUN 82, cr 7.10. POC glucose x24 hr . Wound Type:  (traumatic wound)       Current Nutrition Intake & Therapies:    Average Meal Intake: %     ADULT DIET; Regular; 5 carb choices (75 gm/meal)    Anthropometric Measures:  Height: 5' 7\" (170.2 cm)  Ideal Body Weight (IBW): 148 lbs (67 kg)       Current Body Weight: 190 lb 14.7 oz (86.6 kg) (4/11/23), 129 % IBW. Weight Source: Bed Scale  Current BMI (kg/m2): 29.9  Usual Body Weight: 165 lb (74.8 kg) (2/2023)  % Weight Change (Calculated): 15.7  Weight Adjustment For: Amputation  Total Adjusted Percentage (Calculated): 5.9  Adjusted Ideal Body Weight (lbs) (Calculated): 139.3 lbs  Adjusted Ideal Body Weight (kg) (Calculated): 63.32 kg  Adjusted % Ideal Body Weight (Calculated): 137.1  Adjusted BMI (kg/m2) (Calculated): 31.7  BMI Categories: Obese Class 1 (BMI 30.0-34. 9)    Estimated Daily Nutrient Needs:  Energy Requirements Based On: Formula  Weight Used for Energy Requirements: Current  Energy (kcal/day): 5462-0908  Weight Used for Protein Requirements: Current (x1.2-1.3g)  Protein (g/day): 103-113  Method Used
D/C plan: Home w/ [id number]. Pt will need a LYFT to [facility] as his car is there. Pt is a HD pt at [facility]. His chair time is MWF at 10:30 am. Pt drives himself to his dialysis. Pt's car is at [facility]. Pt will need a LYFT there. Pt will d/c w/ [address]. CM to continue to follow to assist w/ care transition.
DC Plan: [facility] - INPATIENT. Patient agreeable to DC later today. Patient needing transport to get vehicle at dialysis center. Charge nurse aware of transport needed. Home Care orders placed.
Hospitalist Progress Note    Patient: Barbara Barrera MRN: [id number]  CSN: [id number]    YOB: 1970  Age: 50 y.o. Sex: male    DOA: 4/5/2023 LOS:  LOS: 7 days          Chief Complaint:    Low glucose      Assessment/Plan     AMS, diarrhea     Encephalopathy -  Resolved     ESRD -  HD per nephrology  Due today  K is elevated, nephrology on case     Diarrhea -  CT abdomen/pelvis with no colitis  History of C-diff  On empiric flagyl  Florastor   Imodium PRN  Not watery diarrhea, more loose chronic appearing stools     PVD -  Continue on aspirin    SOB-better after getting BG up this am     HTN -  Continue home meds, monitor BP     Right BKA/PVD    Medical noncompliance    Hypoglycemia-treating acutely  Dextrose IV gtt  Discussed with nurse    No discharge today     Disposition :  Active Hospital Problems    Diagnosis     Toxic metabolic encephalopathy [H95.3]     ESRD (end stage renal disease) ([facility] [N18.6]     History of hypoglycemia [Z86.39]     Chronic diarrhea [K52.9]     Chronic anemia [D64.9]     Hypertension [I10]     Non-compliance with renal dialysis [Z91.158]     HTN (hypertension) [I10]     Hx of BKA, right ([facility] [address] [Z89.511]     ESRD (end stage renal disease) on dialysis ([facility] [address] [N18.6, Z99.2]     Iron deficiency anemia [D50.9]        Subjective:  I feel SOB today  Feels bad  BG has been low  Treated for high potassium earlier      Review of systems:    Constitutional: denies fevers, chills, myalgias  Respiratory: denies SOB, cough  Cardiovascular: denies chest pain, palpitations  Gastrointestinal: denies nausea, vomiting, diarrhea      Vital signs/Intake and Output:  Visit Vitals  /74   Pulse 70   Temp 98.2 °F (36.8 °C) (Oral)   Resp 16   Ht 5' 7\" (1.702 m)   Wt 190 lb 14.7 oz (86.6 kg)   SpO2 99%   BMI 29.90 kg/m²     Current Shift:  No intake/output data recorded.   Last three shifts:  04/10 1901 - 04/12 0700  In: 2040 [P.O.:2040]  Out: 0     Exam:    General: ill appearing AAM
Hospitalist Progress Note    Patient: Coby Melendez MRN: [id number]  CSN: [id number]    YOB: 1970  Age: 50 y.o. Sex: male    DOA: 4/5/2023 LOS:  LOS: 6 days                Assessment/Plan     Active Hospital Problems    Diagnosis     Toxic metabolic encephalopathy [W72.0]     ESRD (end stage renal disease) ([facility] [address] [N18.6]     History of hypoglycemia [Z86.39]     Chronic diarrhea [K52.9]     Chronic anemia [D64.9]     Hypertension [I10]     Non-compliance with renal dialysis [Z91.158]     HTN (hypertension) [I10]     Hx of BKA, right ([facility] [address] [Z89.511]     ESRD (end stage renal disease) on dialysis ([facility] [address] [N18.6, Z99.2]     Iron deficiency anemia [D50.9]         Chief complaint :  AMS, diarrhea    Encephalopathy -  Resolved    ESRD -  HD per nephrology    Diarrhea -  CT abdomen/pelvis with no colitis  History of C-diff,   On empiric flagyl  Florastor     PVD -  Continue on aspirin    HTN -  Continue home meds, monitor BP    Right BKA      Disposition : 1-2 days    Review of systems  General: No fevers or chills. Cardiovascular: No chest pain or pressure. No palpitations. Pulmonary: No shortness of breath. Gastrointestinal: No nausea, vomiting. Physical Exam:  General: Awake, cooperative, no acute distress    HEENT: NC, Atraumatic. PERRLA, anicteric sclerae. Lungs: CTA Bilaterally. No Wheezing/Rhonchi/Rales. Heart:  S1 S2,  No murmur, No Rubs, No Gallops  Abdomen: Soft, Non distended, Non tender. +Bowel sounds,   Extremities: Right BKA  Psych:   Not anxious or agitated. Neurologic:  No acute neurological deficit.          Vital signs/Intake and Output:  Visit Vitals  BP (!) 144/69   Pulse 86   Temp 98 °F (36.7 °C) (Oral)   Resp 16   Ht 5' 7\" (1.702 m)   Wt 190 lb 14.7 oz (86.6 kg)   SpO2 96%   BMI 29.90 kg/m²     Current Shift:  04/11 0701 - 04/11 1900  In: 720 [P.O.:720]  Out: -   Last three shifts:  04/09 1901 - 04/11 0700  In: 1220 [P.O.:720]  Out: 2500             Labs: Results:
Hospitalist Progress Note    Patient: Mary Lamar MRN: [id number]  CSN: [id number]    YOB: 1970  Age: 50 y.o.   Sex: male    DOA: 4/5/2023 LOS:  LOS: 8 days          Chief Complaint:      Low glucose    Assessment/Plan        Encephalopathy -  Resolved     ESRD -  Dialysis again tomorrow, done 4/12     Diarrhea -  CT abdomen/pelvis with no colitis  Improved now  On empiric flagyl  Florastor   Imodium PRN  Not watery diarrhea, more loose chronic appearing stools     PVD -  Continue on aspirin     SOB-resolved    Tgfiftpuipoj-dtogwtsa-xc insulin, can stop dextrose gtt     HTN -  Continue home meds, monitor BP     Right BKA/PVD     Medical noncompliance     If stable then d/c home tomorrow after dialysis     Disposition :  Active Hospital Problems    Diagnosis     Toxic metabolic encephalopathy [B72.4]     ESRD (end stage renal disease) ([facility] [address] [N18.6]     History of hypoglycemia [Z86.39]     Chronic diarrhea [K52.9]     Chronic anemia [D64.9]     Hypertension [I10]     Non-compliance with renal dialysis [Z91.158]     HTN (hypertension) [I10]     Hx of BKA, right ([facility]) [Z89.511]     ESRD (end stage renal disease) on dialysis ([facility] [address] [N18.6, Z99.2]     Iron deficiency anemia [D50.9]        Subjective:    I feel better  No SOB  Stools less frequent and more formed now  No abd pain    Review of systems:    Constitutional: denies fevers, chills,   Respiratory: denies SOB, cough  Cardiovascular: denies chest pain, palpitations  Gastrointestinal: denies nausea, vomiting      Vital signs/Intake and Output:  Visit Vitals  BP (!) 169/78   Pulse 95   Temp 98.1 °F (36.7 °C) (Oral)   Resp 20   Ht 5' 7\" (1.702 m)   Wt 190 lb 14.7 oz (86.6 kg)   SpO2 100%   BMI 29.90 kg/m²     Current Shift:  04/13 0701 - 04/13 1900  In: 240 [P.O.:240]  Out: -   Last three shifts:  04/11 1901 - 04/13 0700  In: 2780 [P.O.:2280]  Out: 1950     Exam:    General: in good spirits AAM alert, NAD, OX3  CVS:Regular rate and rhythm, no M/R/G,
Patient:  Deepthi Boone  :  1970  Gender:  male  MRN #:  [id number]    Assessment:     Deepthi Boone is a 50y.o. year old male with ESRD on HD,  DM, anemia, uncontrolled hypertension , hyperphosphatemia non compliance to dialysis prescription, dietary/life style modification and medications. He  has recurrent admission for same for missing dialysis as he does not get enough dialysis when he shows up given that he cut short  his treatment time. BP while he is admitted ,is always better as he takes medicine here. Uncontrolled Hypertension - improving   ESRD/Non compliance  Hyperphosphatemia   Secondary hyperparathyroidism   Hypocalcemia  Persistent diarrhea        Plan:   Patient examined and available labs reviewed during dialysis . He is tolerating dialysis fine, blood flow is adequate, UF target is 2.5 to 4 3kg, BP above target/improving. Denied any symptoms and concern. Plan for next dialysis on Monday    Continue amlodipine, losartan,  cardura to 4 mg ,  clonidine patch 0.2 mg/week   for hypertension   Epogen for anemia of CKD during HD as indicated   Other treatment per primary team       BMD- reviewed- resume calcitriol for hypocalcemia   Sevelamer for hyperphosphatemia       Subjective/Interval Events    Says he feels better  Denied shortness of breath  No new concern while on dialysis     Blood pressure (!) 165/97, pulse 96, temperature 98.2 °F (36.8 °C), resp. rate 18, height 5' 7\" (1.702 m), weight 190 lb 14.7 oz (86.6 kg), SpO2 100 %. Exam:    Awake and alert  No crackles     Ext: no edema     Recent Labs     23  0817   WBC 5.0   HCT 30.0*   MCV 82.9       Recent Labs     23  0817   *   CO2 26   ANIONGAP 6   CALCIUM 7.5*   BUN 56*   GLUCOSE 96       No results for input(s): ALBUMIN, ALKPHOS in the last 72 hours.     Invalid input(s): TOTPR, SGOTAST, BILIT, BILID, SGPTALT, AGRAT, GLOBULIN      Medical Decision Making :    I have reviewed patient's record for pertinent
Patient:  Glenn Rivero  :  1970  Gender:  male  MRN #:  [id number]    Assessment:     Glenn Rivero is a 50y.o. year old male with ESRD on HD,  DM, anemia, uncontrolled hypertension , hyperphosphatemia non compliance to dialysis prescription, dietary/life style modification and medications. He  has recurrent admission for same for missing dialysis as he does not get enough dialysis when he shows up given that he cut short  his treatment time. BP while he is admitted ,is always better as he takes medicine here. Uncontrolled Hypertension - improving   ESRD/Non compliance  Hyperphosphatemia   Secondary hyperparathyroidism   Hypocalcemia  Persistent diarrhea        Plan:   Patient examined and available labs reviewed . Mental status improved, potassium on higher side, stop spironolactone, should be on potassium restricted diet  No indication of dialysis today   Plan for next dialysis tomorrow    Continue amlodipine, losartan, stop aldactone, increase cardura to 4 mg , start clonidine patch 0.2 mg/week   for hypertension   Epogen for anemia of CKD during HD as indicated   Other treatment per primary team       BMD- reviewed- resume calcitriol for hypocalcemia   Check phos in morning       Subjective/Interval Events    Says he feels better  Denied shortness of breath  Some loose stool    Blood pressure (!) 169/78, pulse 95, temperature 98.1 °F (36.7 °C), temperature source Oral, resp. rate 20, height 5' 7\" (1.702 m), weight 190 lb 14.7 oz (86.6 kg), SpO2 100 %. Exam:    Awake and alert  No crackles     Ext: no edema     Recent Labs     23  0817   WBC 5.0   HCT 30.0*   MCV 82.9       Recent Labs     23  0817   *   CO2 26   ANIONGAP 6   CALCIUM 7.5*   BUN 56*   GLUCOSE 96       No results for input(s): ALBUMIN, ALKPHOS in the last 72 hours.     Invalid input(s): TOTPR, SGOTAST, BILIT, BILID, SGPTALT, AGRAT, GLOBULIN      Medical Decision Making :    I have reviewed patient's record for
Patient:  Jesus Royal  :  1970  Gender:  male  MRN #:  [id number]    Assessment:     Jesus Royal is a 50y.o. year old male with ESRD on HD,  DM, anemia, uncontrolled hypertension , hyperphosphatemia non compliance to dialysis prescription, dietary/life style modification and medications. He  has recurrent admission for same for missing dialysis as he does not get enough dialysis when he shows up given that he cut short  his treatment time. BP while he is admitted ,is always better as he takes medicine here. Uncontrolled Hypertension - improving   ESRD/Non compliance  Hyperphosphatemia   Secondary hyperparathyroidism   Hypocalcemia  Persistent diarrhea        Plan:   Patient examined and available labs reviewed . He is lethargic due to hypoglycemia . Already received 10 % dextrose total of 500 cc and is getting again 250 cc   Regular insulin stays longer in ESRD patient , which he received for hyperkalemia this morning. Continue 25 ml dextrose 5 % and frequent FSG check until he has dialysis   Will [place] for dialysis today , which soul help to correct hypoglycemia as well   Continue amlodipine, losartan, aldactone , cardura, imdur  for hypertension   Epogen for anemia of CKD during HD as indicated   Other treatment per primary team       BMD- reviewed- due to persistent diarrhea will hold off calcitriol and sevelamer       Subjective/Interval Events    Lethargic due to hypoglycemia  Unable to provide review os systems     Blood pressure 134/74, pulse 70, temperature 98.2 °F (36.8 °C), temperature source Oral, resp. rate 16, height 5' 7\" (1.702 m), weight 190 lb 14.7 oz (86.6 kg), SpO2 99 %. Exam:    Pt lethargic     Ext: no edema     Recent Labs     23  0315   WBC 5.4   HCT 31.0*   MCV 82.9       Recent Labs     23  0315      CO2 22   ANIONGAP 6   CALCIUM 7.7*   BUN 82*   GLUCOSE 90       No results for input(s): ALBUMIN, ALKPHOS in the last 72 hours.     Invalid input(s): TOTPR,
Patient:  Mary Schulte  :  1970  Gender:  male  MRN #:  [id number]    Assessment:     Mary Schulte is a 50y.o. year old male with ESRD on HD,  DM, anemia, uncontrolled hypertension , hyperphosphatemia non compliance to dialysis prescription, dietary/life style modification and medications. He  has recurrent admission for same for missing dialysis as he does not get enough dialysis when he shows up given that he cut short  his treatment time. BP while he is admitted is always better as he takes medicine here. Uncontrolled Hypertension - improving   ESRD/Non compliance  Hyperphosphatemia   Secondary hyperparathyroidism   Hypocalcemia  Persistent diarrhea        Plan:   Patient examined and available labs reviewed . Report multiple times diarrhea overnight . Will plan for dialysis today for clearance with 2 k bath  and some volume removal    Next dialysis on Wednesday     Continue amlodipine, losartan, aldactone , cardura, imdur  for hypertension   Epogen for anemia of CKD during HD as indicated   Diarrhea work up per primary team, says he still has frequently       BMD- reviewed- due to persistent diarrhea will hold off calcitriol and sevelamer       Subjective/Interval Events    Still has frequent diarrhea   BP above target   No SOB  Denied other symptoms and concern        Blood pressure (!) 180/86, pulse 97, temperature 97.4 °F (36.3 °C), temperature source Oral, resp. rate 19, height 5' 7\" (1.702 m), weight 190 lb 0.6 oz (86.2 kg), SpO2 100 %.     Exam:    Pt awake and alert     Ext: no edema     Recent Labs     23  0218 04/10/23  0339   WBC 4.4*  --    HCT 30.6* 31.9*   MCV 84.1  --        Recent Labs     04/10/23  0339   *   CO2 21   ANIONGAP 8   CALCIUM 7.8*   BUN 74*   GLUCOSE 78       Recent Labs     23  0218   ALBUMIN 0.6*   ALKPHOS 116         Medical Decision Making :    I have reviewed patient's record for pertinent labs, radiology and other test . I have reviewed old
Patient:  Sarmad Hickman  :  1970  Gender:  male  MRN #:  [id number]    Assessment:     Sarmad Hickman is a 50y.o. year old male with ESRD on HD,  DM, anemia, uncontrolled hypertension , hyperphosphatemia non compliance to dialysis prescription, dietary/life style modification and medications. He  has recurrent admission for same for missing dialysis as he does not get enough dialysis when he shows up given that he cut short  his treatment time. BP while he is admitted ,is always better as he takes medicine here. Uncontrolled Hypertension - improving   ESRD/Non compliance  Hyperphosphatemia   Secondary hyperparathyroidism   Hypocalcemia  Persistent diarrhea        Plan:   Patient examined and available labs reviewed . Reports he still has some loose stool , no abdominal pain and vomiting . CT scan of abdomen reviewed/noted that he is on flagyl. No clinical and metabolic indication of dialysis . Will plan for dialysis tomorrow      Continue amlodipine, losartan, aldactone , cardura, imdur  for hypertension   Epogen for anemia of CKD during HD as indicated   Other treatment per primary team       BMD- reviewed- due to persistent diarrhea will hold off calcitriol and sevelamer       Subjective/Interval Events    Frequent loose stool , no abdominal pain and vomiting   BP above target   No SOB  Denied other symptoms and concern        Blood pressure (!) 149/67, pulse 89, temperature 98.5 °F (36.9 °C), temperature source Oral, resp. rate 18, height 5' 7\" (1.702 m), weight 190 lb 14.7 oz (86.6 kg), SpO2 92 %. Exam:    Pt awake and alert     Ext: no edema     Recent Labs     04/10/23  0339   HCT 31.9*       Recent Labs     04/10/23  0339   *   CO2 21   ANIONGAP 8   CALCIUM 7.8*   BUN 74*   GLUCOSE 78       No results for input(s): ALBUMIN, ALKPHOS in the last 72 hours.     Invalid input(s): TOTPR, SGOTAST, BILIT, BILID, SGPTALT, AGRAT, GLOBULIN      Medical Decision Making :    I have reviewed
Treatment time-3.5hrs  Fluid removed-2L  BVP-  Medications given-none  Hepatitis Status-4/6/23 NEGATIVE/SUSCEPTIBLE  Diagnosis-Uremia        Access-  Left TDC with no s/s of infection at site. Patent. Dressing dated 4/10. Treatment-  1400- Dialysis started  1730-Dialysis complete  Heparin 2.3ml left to indwell to each lumen. Red Curos caps placed to the end of both lumens. Pt left in stable condition.        Pre and Post Report given Jose Due
BMI 29.76 kg/m²     Current Shift:  No intake/output data recorded. Last three shifts:  04/08 1901 - 04/10 0700  In: 1210 [P.O.:1210]  Out: 0     Exam:    General: chronically AAM, NAD  CVS:Regular rate and rhythm, no M/R/G, S1/S2 heard, no thrill  Lungs:Clear to auscultation bilaterally, no wheezes, rhonchi, or rales  Abdomen: Soft, Nontender, No distention, Normal Bowel sounds  Extremities: right BKA  Neuro:grossly normal , follows commands  Psych:appropriate    Labs: Results:       Chemistry Recent Labs     04/08/23  0218 04/10/23  0339   GLUCOSE 74 78    135*   K 4.1 5.6*    106   CO2 28 21   BUN 23* 74*   CREATININE 4.11* 7.53*   CALCIUM 7.3* 7.8*   BILITOT 0.3  --    AST 15  --    ALT 16  --    ALKPHOS 116  --    PROT 6.5  --    GLOB 4.1*  --    LABGLOM 17* 8*      CBC w/Diff Recent Labs     04/08/23  0218 04/10/23  0339   WBC 4.4*  --    RBC 3.64*  --    HGB 9.9* 10.3*   HCT 30.6* 31.9*     --    LYMPHOPCT 10*  --       Cardiac Enzymes No results for input(s): CKTOTAL, CKMB, CKMBINDEX, TROPONINI, FLOYD in the last 72 hours. Coagulation No results for input(s): PROTIME, INR, APTT in the last 72 hours. Lipid Panel Lab Results   Component Value Date/Time    CHOL 115 06/02/2022 09:45 AM    TRIG 35 06/02/2022 09:45 AM    HDL 62 06/02/2022 09:45 AM    LDLCALC 46 06/02/2022 09:45 AM    LABVLDL 7 06/02/2022 09:45 AM    CHOLHDLRATIO 1.9 06/02/2022 09:45 AM      BNP No results for input(s): BNP in the last 72 hours.    Liver Enzymes Recent Labs     04/08/23 0218   ALT 16   AST 15   ALKPHOS 116   BILITOT 0.3      Thyroid Studies Lab Results   Component Value Date/Time    TSH 2.38 08/29/2021 01:20 AM          Procedures/imaging: see electronic medical records for all procedures/Xrays and details which were not copied into this note but were reviewed prior to creation of Leigh Ann Fine MD

## 2023-04-14 NOTE — PLAN OF CARE
Problem: Chronic Conditions and Co-morbidities  Goal: Patient's chronic conditions and co-morbidity symptoms are monitored and maintained or improved  Outcome: Progressing  Flowsheets (Taken 4/14/2023 0900 by Yomi Cantu RN)  Care Plan - Patient's Chronic Conditions and Co-Morbidity Symptoms are Monitored and Maintained or Improved:   Monitor and assess patient's chronic conditions and comorbid symptoms for stability, deterioration, or improvement   Collaborate with multidisciplinary team to address chronic and comorbid conditions and prevent exacerbation or deterioration     Problem: Chronic Conditions and Co-morbidities  Goal: Patient's chronic conditions and co-morbidity symptoms are monitored and maintained or improved  Outcome: Progressing  Flowsheets (Taken 4/14/2023 0900 by Yomi Cantu RN)  Care Plan - Patient's Chronic Conditions and Co-Morbidity Symptoms are Monitored and Maintained or Improved:   Monitor and assess patient's chronic conditions and comorbid symptoms for stability, deterioration, or improvement   Collaborate with multidisciplinary team to address chronic and comorbid conditions and prevent exacerbation or deterioration

## 2023-04-17 ENCOUNTER — HOSPITAL ENCOUNTER (EMERGENCY)
Facility: HOSPITAL | Age: 53
Discharge: HOME | End: 2023-04-17

## 2023-04-17 ENCOUNTER — HOME CARE VISIT (OUTPATIENT)
Age: 53
End: 2023-04-17

## 2023-04-17 VITALS
BODY MASS INDEX: 27.68 KG/M2 | SYSTOLIC BLOOD PRESSURE: 185 MMHG | OXYGEN SATURATION: 93 % | TEMPERATURE: 97.7 F | WEIGHT: 176.37 LBS | HEART RATE: 92 BPM | DIASTOLIC BLOOD PRESSURE: 69 MMHG | RESPIRATION RATE: 18 BRPM | HEIGHT: 67 IN

## 2023-04-17 NOTE — ED NOTES
Pt requested his BP be checked again. Pt BP did not change much, but pt is not having any symptoms r/t hypertension. Pt has decided he wanted to leave as he was only here because dialysis referred him here. Pt left the ED att.         Becky Laureano RN  04/17/23 0018

## 2023-04-17 NOTE — ED TRIAGE NOTES
Pt arrived with c/o hypertension. Pt states he was 45 minutes into his dialysis this morning when they stopped his treatment for a systolic pressure of 096. Pt states he feels sleepy but has not real symptoms. Pt is alert and oriented x4. Vital signs are stable.

## 2023-04-18 ENCOUNTER — HOME CARE VISIT (OUTPATIENT)
Age: 53
End: 2023-04-18

## 2023-04-19 ENCOUNTER — HOME CARE VISIT (OUTPATIENT)
Age: 53
End: 2023-04-19

## 2023-04-20 ENCOUNTER — HOME CARE VISIT (OUTPATIENT)
Age: 53
End: 2023-04-20

## 2023-04-21 ENCOUNTER — APPOINTMENT (OUTPATIENT)
Facility: HOSPITAL | Age: 53
DRG: 304 | End: 2023-04-21
Payer: MEDICARE

## 2023-04-21 ENCOUNTER — HOSPITAL ENCOUNTER (INPATIENT)
Facility: HOSPITAL | Age: 53
LOS: 2 days | Discharge: HOME | DRG: 304 | End: 2023-04-24
Attending: EMERGENCY MEDICINE | Admitting: FAMILY MEDICINE
Payer: MEDICARE

## 2023-04-21 DIAGNOSIS — R94.31 ABNORMAL EKG: ICD-10-CM

## 2023-04-21 DIAGNOSIS — E87.5 HYPERKALEMIA: ICD-10-CM

## 2023-04-21 DIAGNOSIS — R06.02 SOB (SHORTNESS OF BREATH): ICD-10-CM

## 2023-04-21 DIAGNOSIS — E87.20 METABOLIC ACIDOSIS: ICD-10-CM

## 2023-04-21 DIAGNOSIS — I16.9 HYPERTENSIVE CRISIS: ICD-10-CM

## 2023-04-21 DIAGNOSIS — J96.01 ACUTE HYPOXEMIC RESPIRATORY FAILURE (HCC): Primary | ICD-10-CM

## 2023-04-21 DIAGNOSIS — J81.0 ACUTE PULMONARY EDEMA (HCC): ICD-10-CM

## 2023-04-21 LAB
ANION GAP SERPL CALC-SCNC: 8 MMOL/L (ref 3–18)
BASE DEFICIT BLDV-SCNC: 7.7 MMOL/L
BASOPHILS # BLD: 0 K/UL (ref 0–0.1)
BASOPHILS NFR BLD: 1 % (ref 0–2)
BUN SERPL-MCNC: 100 MG/DL (ref 7–18)
BUN/CREAT SERPL: 9 (ref 12–20)
CALCIUM SERPL-MCNC: 7 MG/DL (ref 8.5–10.1)
CHLORIDE SERPL-SCNC: 109 MMOL/L (ref 100–111)
CO2 SERPL-SCNC: 20 MMOL/L (ref 21–32)
CREAT SERPL-MCNC: 11.6 MG/DL (ref 0.6–1.3)
DIFFERENTIAL METHOD BLD: ABNORMAL
EOSINOPHIL # BLD: 0.1 K/UL (ref 0–0.4)
EOSINOPHIL NFR BLD: 2 % (ref 0–5)
ERYTHROCYTE [DISTWIDTH] IN BLOOD BY AUTOMATED COUNT: 18.4 % (ref 11.6–14.5)
GLUCOSE SERPL-MCNC: 77 MG/DL (ref 74–99)
HCO3 BLDV-SCNC: 18.5 MMOL/L (ref 23–28)
HCT VFR BLD AUTO: 23.6 % (ref 36–48)
HGB BLD-MCNC: 7.9 G/DL (ref 13–16)
IMM GRANULOCYTES # BLD AUTO: 0 K/UL (ref 0–0.04)
IMM GRANULOCYTES NFR BLD AUTO: 0 % (ref 0–0.5)
LYMPHOCYTES # BLD: 0.5 K/UL (ref 0.9–3.6)
LYMPHOCYTES NFR BLD: 10 % (ref 21–52)
MCH RBC QN AUTO: 26.7 PG (ref 24–34)
MCHC RBC AUTO-ENTMCNC: 33.5 G/DL (ref 31–37)
MCV RBC AUTO: 79.7 FL (ref 78–100)
MONOCYTES # BLD: 0.3 K/UL (ref 0.05–1.2)
MONOCYTES NFR BLD: 7 % (ref 3–10)
NEUTS SEG # BLD: 3.8 K/UL (ref 1.8–8)
NEUTS SEG NFR BLD: 80 % (ref 40–73)
NRBC # BLD: 0 K/UL (ref 0–0.01)
NRBC BLD-RTO: 0 PER 100 WBC
PCO2 BLDV: 39.5 MMHG (ref 41–51)
PH BLDV: 7.28 (ref 7.32–7.42)
PLATELET # BLD AUTO: 122 K/UL (ref 135–420)
PMV BLD AUTO: 10.3 FL (ref 9.2–11.8)
PO2 BLDV: 67 MMHG (ref 25–40)
POTASSIUM SERPL-SCNC: 5.8 MMOL/L (ref 3.5–5.5)
RBC # BLD AUTO: 2.96 M/UL (ref 4.35–5.65)
SAO2 % BLDV: 90.3 % (ref 65–88)
SERVICE CMNT-IMP: ABNORMAL
SODIUM SERPL-SCNC: 137 MMOL/L (ref 136–145)
SPECIMEN TYPE: ABNORMAL
WBC # BLD AUTO: 4.8 K/UL (ref 4.6–13.2)

## 2023-04-21 PROCEDURE — 93005 ELECTROCARDIOGRAM TRACING: CPT | Performed by: EMERGENCY MEDICINE

## 2023-04-21 PROCEDURE — 2500000003 HC RX 250 WO HCPCS: Performed by: EMERGENCY MEDICINE

## 2023-04-21 PROCEDURE — 99285 EMERGENCY DEPT VISIT HI MDM: CPT

## 2023-04-21 PROCEDURE — 6360000002 HC RX W HCPCS: Performed by: EMERGENCY MEDICINE

## 2023-04-21 PROCEDURE — 80048 BASIC METABOLIC PNL TOTAL CA: CPT

## 2023-04-21 PROCEDURE — 71045 X-RAY EXAM CHEST 1 VIEW: CPT

## 2023-04-21 PROCEDURE — 82803 BLOOD GASES ANY COMBINATION: CPT

## 2023-04-21 PROCEDURE — 96374 THER/PROPH/DIAG INJ IV PUSH: CPT

## 2023-04-21 PROCEDURE — 85025 COMPLETE CBC W/AUTO DIFF WBC: CPT

## 2023-04-21 PROCEDURE — 96375 TX/PRO/DX INJ NEW DRUG ADDON: CPT

## 2023-04-21 RX ORDER — LABETALOL HYDROCHLORIDE 5 MG/ML
20 INJECTION, SOLUTION INTRAVENOUS
Status: COMPLETED | OUTPATIENT
Start: 2023-04-22 | End: 2023-04-22

## 2023-04-21 RX ORDER — LABETALOL HYDROCHLORIDE 5 MG/ML
20 INJECTION, SOLUTION INTRAVENOUS ONCE
Status: COMPLETED | OUTPATIENT
Start: 2023-04-21 | End: 2023-04-21

## 2023-04-21 RX ORDER — HYDRALAZINE HYDROCHLORIDE 20 MG/ML
10 INJECTION INTRAMUSCULAR; INTRAVENOUS ONCE
Status: COMPLETED | OUTPATIENT
Start: 2023-04-21 | End: 2023-04-21

## 2023-04-21 RX ADMIN — HYDRALAZINE HYDROCHLORIDE 10 MG: 20 INJECTION INTRAMUSCULAR; INTRAVENOUS at 23:19

## 2023-04-21 RX ADMIN — LABETALOL HYDROCHLORIDE 20 MG: 5 INJECTION, SOLUTION INTRAVENOUS at 21:58

## 2023-04-21 ASSESSMENT — PAIN - FUNCTIONAL ASSESSMENT: PAIN_FUNCTIONAL_ASSESSMENT: NONE - DENIES PAIN

## 2023-04-22 ENCOUNTER — APPOINTMENT (OUTPATIENT)
Facility: HOSPITAL | Age: 53
DRG: 304 | End: 2023-04-22
Payer: MEDICARE

## 2023-04-22 ENCOUNTER — HOME CARE VISIT (OUTPATIENT)
Age: 53
End: 2023-04-22

## 2023-04-22 PROBLEM — F99 PSYCHIATRIC ILLNESS: Status: ACTIVE | Noted: 2023-04-22

## 2023-04-22 PROBLEM — I50.32 CHRONIC DIASTOLIC CHF (CONGESTIVE HEART FAILURE) (HCC): Status: ACTIVE | Noted: 2023-04-22

## 2023-04-22 PROBLEM — E87.70 VOLUME OVERLOAD: Status: ACTIVE | Noted: 2022-06-20

## 2023-04-22 PROBLEM — I16.0 HYPERTENSIVE URGENCY: Status: ACTIVE | Noted: 2023-04-22

## 2023-04-22 PROBLEM — L97.522 CHRONIC ULCER OF LEFT FOOT WITH FAT LAYER EXPOSED (HCC): Status: ACTIVE | Noted: 2023-04-22

## 2023-04-22 PROBLEM — J96.01 ACUTE HYPOXEMIC RESPIRATORY FAILURE (HCC): Status: ACTIVE | Noted: 2023-04-22

## 2023-04-22 PROBLEM — Z99.2 END-STAGE RENAL DISEASE NEEDING DIALYSIS (HCC): Status: ACTIVE | Noted: 2023-02-06

## 2023-04-22 PROBLEM — J96.01 ACUTE RESPIRATORY FAILURE WITH HYPOXEMIA (HCC): Status: ACTIVE | Noted: 2023-04-22

## 2023-04-22 LAB
ALBUMIN SERPL-MCNC: 2.8 G/DL (ref 3.4–5)
ALBUMIN/GLOB SERPL: 0.6 (ref 0.8–1.7)
ALP SERPL-CCNC: 83 U/L (ref 45–117)
ALT SERPL-CCNC: 15 U/L (ref 16–61)
ANION GAP SERPL CALC-SCNC: 8 MMOL/L (ref 3–18)
AST SERPL-CCNC: 9 U/L (ref 10–38)
BILIRUB SERPL-MCNC: 0.7 MG/DL (ref 0.2–1)
BUN SERPL-MCNC: 39 MG/DL (ref 7–18)
BUN/CREAT SERPL: 6 (ref 12–20)
CALCIUM SERPL-MCNC: 7.5 MG/DL (ref 8.5–10.1)
CHLORIDE SERPL-SCNC: 105 MMOL/L (ref 100–111)
CO2 SERPL-SCNC: 24 MMOL/L (ref 21–32)
CREAT SERPL-MCNC: 6.42 MG/DL (ref 0.6–1.3)
GLOBULIN SER CALC-MCNC: 4.6 G/DL (ref 2–4)
GLUCOSE BLD STRIP.AUTO-MCNC: 115 MG/DL (ref 70–110)
GLUCOSE BLD STRIP.AUTO-MCNC: 70 MG/DL (ref 70–110)
GLUCOSE SERPL-MCNC: 78 MG/DL (ref 74–99)
POTASSIUM SERPL-SCNC: 4 MMOL/L (ref 3.5–5.5)
PROT SERPL-MCNC: 7.4 G/DL (ref 6.4–8.2)
SODIUM SERPL-SCNC: 137 MMOL/L (ref 136–145)
TROPONIN I SERPL HS-MCNC: 32 NG/L (ref 0–78)
TROPONIN I SERPL HS-MCNC: 38 NG/L (ref 0–78)

## 2023-04-22 PROCEDURE — 96376 TX/PRO/DX INJ SAME DRUG ADON: CPT

## 2023-04-22 PROCEDURE — 90935 HEMODIALYSIS ONE EVALUATION: CPT

## 2023-04-22 PROCEDURE — 36415 COLL VENOUS BLD VENIPUNCTURE: CPT

## 2023-04-22 PROCEDURE — 82962 GLUCOSE BLOOD TEST: CPT

## 2023-04-22 PROCEDURE — 80053 COMPREHEN METABOLIC PANEL: CPT

## 2023-04-22 PROCEDURE — 2500000003 HC RX 250 WO HCPCS: Performed by: EMERGENCY MEDICINE

## 2023-04-22 PROCEDURE — 6360000002 HC RX W HCPCS: Performed by: STUDENT IN AN ORGANIZED HEALTH CARE EDUCATION/TRAINING PROGRAM

## 2023-04-22 PROCEDURE — 2580000003 HC RX 258: Performed by: FAMILY MEDICINE

## 2023-04-22 PROCEDURE — 6370000000 HC RX 637 (ALT 250 FOR IP): Performed by: FAMILY MEDICINE

## 2023-04-22 PROCEDURE — 5A1D70Z PERFORMANCE OF URINARY FILTRATION, INTERMITTENT, LESS THAN 6 HOURS PER DAY: ICD-10-PCS | Performed by: FAMILY MEDICINE

## 2023-04-22 PROCEDURE — 2000000000 HC ICU R&B

## 2023-04-22 PROCEDURE — 6360000004 HC RX CONTRAST MEDICATION: Performed by: EMERGENCY MEDICINE

## 2023-04-22 PROCEDURE — 6370000000 HC RX 637 (ALT 250 FOR IP): Performed by: STUDENT IN AN ORGANIZED HEALTH CARE EDUCATION/TRAINING PROGRAM

## 2023-04-22 PROCEDURE — 6360000002 HC RX W HCPCS: Performed by: FAMILY MEDICINE

## 2023-04-22 PROCEDURE — 84484 ASSAY OF TROPONIN QUANT: CPT

## 2023-04-22 PROCEDURE — 2500000003 HC RX 250 WO HCPCS: Performed by: FAMILY MEDICINE

## 2023-04-22 PROCEDURE — 6370000000 HC RX 637 (ALT 250 FOR IP): Performed by: INTERNAL MEDICINE

## 2023-04-22 PROCEDURE — 71275 CT ANGIOGRAPHY CHEST: CPT

## 2023-04-22 PROCEDURE — 96375 TX/PRO/DX INJ NEW DRUG ADDON: CPT

## 2023-04-22 RX ORDER — SPIRONOLACTONE 100 MG/1
100 TABLET, FILM COATED ORAL DAILY
Status: DISCONTINUED | OUTPATIENT
Start: 2023-04-22 | End: 2023-04-25 | Stop reason: HOSPADM

## 2023-04-22 RX ORDER — ACETAMINOPHEN 650 MG/1
650 SUPPOSITORY RECTAL EVERY 6 HOURS PRN
Status: DISCONTINUED | OUTPATIENT
Start: 2023-04-22 | End: 2023-04-25 | Stop reason: HOSPADM

## 2023-04-22 RX ORDER — HEPARIN SODIUM 1000 [USP'U]/ML
4000 INJECTION, SOLUTION INTRAVENOUS; SUBCUTANEOUS PRN
Status: DISCONTINUED | OUTPATIENT
Start: 2023-04-22 | End: 2023-04-25 | Stop reason: HOSPADM

## 2023-04-22 RX ORDER — HYDRALAZINE HYDROCHLORIDE 25 MG/1
25 TABLET, FILM COATED ORAL EVERY 8 HOURS SCHEDULED
Status: DISCONTINUED | OUTPATIENT
Start: 2023-04-22 | End: 2023-04-23

## 2023-04-22 RX ORDER — SODIUM CHLORIDE 9 MG/ML
INJECTION, SOLUTION INTRAVENOUS PRN
Status: DISCONTINUED | OUTPATIENT
Start: 2023-04-22 | End: 2023-04-25 | Stop reason: HOSPADM

## 2023-04-22 RX ORDER — ONDANSETRON 2 MG/ML
4 INJECTION INTRAMUSCULAR; INTRAVENOUS EVERY 6 HOURS PRN
Status: DISCONTINUED | OUTPATIENT
Start: 2023-04-22 | End: 2023-04-25 | Stop reason: HOSPADM

## 2023-04-22 RX ORDER — DOXAZOSIN MESYLATE 1 MG/1
2 TABLET ORAL DAILY
Status: DISCONTINUED | OUTPATIENT
Start: 2023-04-22 | End: 2023-04-22

## 2023-04-22 RX ORDER — AMLODIPINE BESYLATE 5 MG/1
10 TABLET ORAL DAILY
Status: DISCONTINUED | OUTPATIENT
Start: 2023-04-22 | End: 2023-04-25 | Stop reason: HOSPADM

## 2023-04-22 RX ORDER — CLONIDINE 0.3 MG/24H
1 PATCH, EXTENDED RELEASE TRANSDERMAL WEEKLY
Status: DISCONTINUED | OUTPATIENT
Start: 2023-04-22 | End: 2023-04-25 | Stop reason: HOSPADM

## 2023-04-22 RX ORDER — SODIUM CHLORIDE 0.9 % (FLUSH) 0.9 %
5-40 SYRINGE (ML) INJECTION EVERY 12 HOURS SCHEDULED
Status: DISCONTINUED | OUTPATIENT
Start: 2023-04-22 | End: 2023-04-25 | Stop reason: HOSPADM

## 2023-04-22 RX ORDER — HEPARIN SODIUM 5000 [USP'U]/ML
5000 INJECTION, SOLUTION INTRAVENOUS; SUBCUTANEOUS EVERY 8 HOURS SCHEDULED
Status: DISCONTINUED | OUTPATIENT
Start: 2023-04-22 | End: 2023-04-25 | Stop reason: HOSPADM

## 2023-04-22 RX ORDER — ACETAMINOPHEN 325 MG/1
650 TABLET ORAL EVERY 6 HOURS PRN
Status: DISCONTINUED | OUTPATIENT
Start: 2023-04-22 | End: 2023-04-25 | Stop reason: HOSPADM

## 2023-04-22 RX ORDER — DOXAZOSIN MESYLATE 4 MG/1
4 TABLET ORAL DAILY
Status: DISCONTINUED | OUTPATIENT
Start: 2023-04-22 | End: 2023-04-25 | Stop reason: HOSPADM

## 2023-04-22 RX ORDER — SEVELAMER CARBONATE 800 MG/1
800 TABLET, FILM COATED ORAL
Status: DISCONTINUED | OUTPATIENT
Start: 2023-04-22 | End: 2023-04-25 | Stop reason: HOSPADM

## 2023-04-22 RX ORDER — SODIUM CHLORIDE 0.9 % (FLUSH) 0.9 %
5-40 SYRINGE (ML) INJECTION PRN
Status: DISCONTINUED | OUTPATIENT
Start: 2023-04-22 | End: 2023-04-25 | Stop reason: HOSPADM

## 2023-04-22 RX ORDER — LOSARTAN POTASSIUM 50 MG/1
100 TABLET ORAL DAILY
Status: DISCONTINUED | OUTPATIENT
Start: 2023-04-22 | End: 2023-04-25 | Stop reason: HOSPADM

## 2023-04-22 RX ORDER — POLYETHYLENE GLYCOL 3350 17 G/17G
17 POWDER, FOR SOLUTION ORAL DAILY PRN
Status: DISCONTINUED | OUTPATIENT
Start: 2023-04-22 | End: 2023-04-25 | Stop reason: HOSPADM

## 2023-04-22 RX ORDER — SODIUM BICARBONATE 650 MG/1
650 TABLET ORAL 3 TIMES DAILY
Status: DISCONTINUED | OUTPATIENT
Start: 2023-04-22 | End: 2023-04-22

## 2023-04-22 RX ORDER — LABETALOL 200 MG/1
200 TABLET, FILM COATED ORAL EVERY 12 HOURS SCHEDULED
Status: DISCONTINUED | OUTPATIENT
Start: 2023-04-22 | End: 2023-04-25 | Stop reason: HOSPADM

## 2023-04-22 RX ORDER — LANOLIN ALCOHOL/MO/W.PET/CERES
1000 CREAM (GRAM) TOPICAL DAILY
Status: DISCONTINUED | OUTPATIENT
Start: 2023-04-22 | End: 2023-04-25 | Stop reason: HOSPADM

## 2023-04-22 RX ORDER — CALCITRIOL 0.25 UG/1
0.25 CAPSULE, LIQUID FILLED ORAL DAILY
Status: DISCONTINUED | OUTPATIENT
Start: 2023-04-22 | End: 2023-04-25 | Stop reason: HOSPADM

## 2023-04-22 RX ORDER — SPIRONOLACTONE 100 MG/1
100 TABLET, FILM COATED ORAL DAILY
Status: DISCONTINUED | OUTPATIENT
Start: 2023-04-22 | End: 2023-04-22

## 2023-04-22 RX ORDER — ONDANSETRON 4 MG/1
4 TABLET, ORALLY DISINTEGRATING ORAL EVERY 8 HOURS PRN
Status: DISCONTINUED | OUTPATIENT
Start: 2023-04-22 | End: 2023-04-25 | Stop reason: HOSPADM

## 2023-04-22 RX ORDER — ASPIRIN 81 MG/1
81 TABLET ORAL DAILY
Status: DISCONTINUED | OUTPATIENT
Start: 2023-04-22 | End: 2023-04-25 | Stop reason: HOSPADM

## 2023-04-22 RX ADMIN — IOPAMIDOL 100 ML: 755 INJECTION, SOLUTION INTRAVENOUS at 01:38

## 2023-04-22 RX ADMIN — AMLODIPINE BESYLATE 10 MG: 5 TABLET ORAL at 00:44

## 2023-04-22 RX ADMIN — HEPARIN SODIUM 5000 UNITS: 5000 INJECTION INTRAVENOUS; SUBCUTANEOUS at 22:22

## 2023-04-22 RX ADMIN — HEPARIN SODIUM 4000 UNITS: 1000 INJECTION INTRAVENOUS; SUBCUTANEOUS at 23:26

## 2023-04-22 RX ADMIN — HEPARIN SODIUM 5000 UNITS: 5000 INJECTION INTRAVENOUS; SUBCUTANEOUS at 06:03

## 2023-04-22 RX ADMIN — LABETALOL HYDROCHLORIDE 200 MG: 200 TABLET, FILM COATED ORAL at 20:59

## 2023-04-22 RX ADMIN — ASPIRIN 81 MG: 81 TABLET, COATED ORAL at 10:23

## 2023-04-22 RX ADMIN — CALCITRIOL 0.25 MCG: 0.25 CAPSULE ORAL at 10:23

## 2023-04-22 RX ADMIN — HEPARIN SODIUM 5000 UNITS: 5000 INJECTION INTRAVENOUS; SUBCUTANEOUS at 13:11

## 2023-04-22 RX ADMIN — DOXAZOSIN 2 MG: 1 TABLET ORAL at 02:13

## 2023-04-22 RX ADMIN — LABETALOL HYDROCHLORIDE 20 MG: 5 INJECTION, SOLUTION INTRAVENOUS at 00:44

## 2023-04-22 RX ADMIN — NITROGLYCERIN 1 INCH: 20 OINTMENT TOPICAL at 21:05

## 2023-04-22 RX ADMIN — SODIUM CHLORIDE, PRESERVATIVE FREE 10 ML: 5 INJECTION INTRAVENOUS at 20:59

## 2023-04-22 RX ADMIN — SEVELAMER CARBONATE 800 MG: 800 TABLET, FILM COATED ORAL at 17:10

## 2023-04-22 RX ADMIN — SODIUM CHLORIDE, PRESERVATIVE FREE 10 ML: 5 INJECTION INTRAVENOUS at 10:24

## 2023-04-22 RX ADMIN — SEVELAMER CARBONATE 800 MG: 800 TABLET, FILM COATED ORAL at 10:23

## 2023-04-22 RX ADMIN — HYDRALAZINE HYDROCHLORIDE 25 MG: 25 TABLET, FILM COATED ORAL at 20:59

## 2023-04-22 RX ADMIN — SEVELAMER CARBONATE 800 MG: 800 TABLET, FILM COATED ORAL at 12:50

## 2023-04-22 RX ADMIN — NITROGLYCERIN 1 INCH: 20 OINTMENT TOPICAL at 00:45

## 2023-04-22 RX ADMIN — CYANOCOBALAMIN TAB 1000 MCG 1000 MCG: 1000 TAB at 10:23

## 2023-04-22 RX ADMIN — SPIRONOLACTONE 100 MG: 100 TABLET ORAL at 13:11

## 2023-04-22 RX ADMIN — LOSARTAN POTASSIUM 100 MG: 50 TABLET, FILM COATED ORAL at 00:44

## 2023-04-22 RX ADMIN — SODIUM CHLORIDE 5 MG/HR: 9 INJECTION, SOLUTION INTRAVENOUS at 02:14

## 2023-04-22 RX ADMIN — NITROGLYCERIN 1 INCH: 20 OINTMENT TOPICAL at 06:03

## 2023-04-22 RX ADMIN — SPIRONOLACTONE 100 MG: 100 TABLET ORAL at 02:12

## 2023-04-22 ASSESSMENT — PAIN SCALES - GENERAL
PAINLEVEL_OUTOF10: 0

## 2023-04-23 ENCOUNTER — APPOINTMENT (OUTPATIENT)
Facility: HOSPITAL | Age: 53
DRG: 304 | End: 2023-04-23
Payer: MEDICARE

## 2023-04-23 LAB
ALBUMIN SERPL-MCNC: 2.7 G/DL (ref 3.4–5)
ALBUMIN/GLOB SERPL: 0.6 (ref 0.8–1.7)
ALP SERPL-CCNC: 106 U/L (ref 45–117)
ALT SERPL-CCNC: 14 U/L (ref 16–61)
ANION GAP SERPL CALC-SCNC: 7 MMOL/L (ref 3–18)
AST SERPL-CCNC: 12 U/L (ref 10–38)
BASOPHILS # BLD: 0 K/UL (ref 0–0.1)
BASOPHILS NFR BLD: 1 % (ref 0–2)
BILIRUB SERPL-MCNC: 0.5 MG/DL (ref 0.2–1)
BUN SERPL-MCNC: 25 MG/DL (ref 7–18)
BUN/CREAT SERPL: 5 (ref 12–20)
CALCIUM SERPL-MCNC: 8 MG/DL (ref 8.5–10.1)
CHLORIDE SERPL-SCNC: 100 MMOL/L (ref 100–111)
CO2 SERPL-SCNC: 28 MMOL/L (ref 21–32)
CREAT SERPL-MCNC: 4.7 MG/DL (ref 0.6–1.3)
DIFFERENTIAL METHOD BLD: ABNORMAL
ECHO AO ASC DIAM: 3.1 CM
ECHO AO ASCENDING AORTA INDEX: 1.65 CM/M2
ECHO AO ROOT DIAM: 2.8 CM
ECHO AO ROOT INDEX: 1.49 CM/M2
ECHO AV AREA PEAK VELOCITY: 1.9 CM2
ECHO AV AREA VTI: 2 CM2
ECHO AV AREA/BSA PEAK VELOCITY: 1 CM2/M2
ECHO AV AREA/BSA VTI: 1.1 CM2/M2
ECHO AV MEAN GRADIENT: 9 MMHG
ECHO AV MEAN VELOCITY: 1.4 M/S
ECHO AV PEAK GRADIENT: 16 MMHG
ECHO AV PEAK VELOCITY: 2 M/S
ECHO AV VELOCITY RATIO: 0.65
ECHO AV VTI: 36.5 CM
ECHO BSA: 1.9 M2
ECHO EST RA PRESSURE: 3 MMHG
ECHO IVC EXP: 1.9 CM
ECHO LA DIAMETER INDEX: 2.5 CM/M2
ECHO LA DIAMETER: 4.7 CM
ECHO LA TO AORTIC ROOT RATIO: 1.68
ECHO LA VOL 2C: 170 ML (ref 18–58)
ECHO LA VOL 4C: 139 ML (ref 18–58)
ECHO LA VOL BP: 144 ML (ref 18–58)
ECHO LA VOL/BSA BIPLANE: 77 ML/M2 (ref 16–34)
ECHO LA VOLUME AREA LENGTH: 156 ML
ECHO LA VOLUME INDEX A2C: 90 ML/M2 (ref 16–34)
ECHO LA VOLUME INDEX A4C: 74 ML/M2 (ref 16–34)
ECHO LA VOLUME INDEX AREA LENGTH: 83 ML/M2 (ref 16–34)
ECHO LV E' LATERAL VELOCITY: 5 CM/S
ECHO LV E' SEPTAL VELOCITY: 5 CM/S
ECHO LV EDV A2C: 125 ML
ECHO LV EDV A4C: 113 ML
ECHO LV EDV BP: 121 ML (ref 67–155)
ECHO LV EDV INDEX A4C: 60 ML/M2
ECHO LV EDV INDEX BP: 64 ML/M2
ECHO LV EDV NDEX A2C: 66 ML/M2
ECHO LV EJECTION FRACTION A2C: 50 %
ECHO LV EJECTION FRACTION A4C: 55 %
ECHO LV EJECTION FRACTION BIPLANE: 53 % (ref 55–100)
ECHO LV ESV A2C: 62 ML
ECHO LV ESV A4C: 51 ML
ECHO LV ESV BP: 57 ML (ref 22–58)
ECHO LV ESV INDEX A2C: 33 ML/M2
ECHO LV ESV INDEX A4C: 27 ML/M2
ECHO LV ESV INDEX BP: 30 ML/M2
ECHO LV FRACTIONAL SHORTENING: 21 % (ref 28–44)
ECHO LV INTERNAL DIMENSION DIASTOLE INDEX: 2.77 CM/M2
ECHO LV INTERNAL DIMENSION DIASTOLIC: 5.2 CM (ref 4.2–5.9)
ECHO LV INTERNAL DIMENSION SYSTOLIC INDEX: 2.18 CM/M2
ECHO LV INTERNAL DIMENSION SYSTOLIC: 4.1 CM
ECHO LV IVSD: 1.2 CM (ref 0.6–1)
ECHO LV MASS 2D: 248.8 G (ref 88–224)
ECHO LV MASS INDEX 2D: 132.4 G/M2 (ref 49–115)
ECHO LV POSTERIOR WALL DIASTOLIC: 1.2 CM (ref 0.6–1)
ECHO LV RELATIVE WALL THICKNESS RATIO: 0.46
ECHO LVOT AREA: 3.1 CM2
ECHO LVOT AV VTI INDEX: 0.64
ECHO LVOT DIAM: 2 CM
ECHO LVOT MEAN GRADIENT: 4 MMHG
ECHO LVOT PEAK GRADIENT: 6 MMHG
ECHO LVOT PEAK VELOCITY: 1.3 M/S
ECHO LVOT STROKE VOLUME INDEX: 39.1 ML/M2
ECHO LVOT SV: 73.5 ML
ECHO LVOT VTI: 23.4 CM
ECHO MV A VELOCITY: 0.81 M/S
ECHO MV E DECELERATION TIME (DT): 255.1 MS
ECHO MV E VELOCITY: 0.87 M/S
ECHO MV E/A RATIO: 1.07
ECHO MV E/E' LATERAL: 17.4
ECHO MV E/E' RATIO (AVERAGED): 17.4
ECHO MV E/E' SEPTAL: 17.4
ECHO PV MAX VELOCITY: 1.1 M/S
ECHO PV MEAN GRADIENT: 3 MMHG
ECHO PV MEAN VELOCITY: 0.8 M/S
ECHO PV PEAK GRADIENT: 4 MMHG
ECHO RA END SYSTOLIC VOLUME APICAL 4 CHAMBER INDEX BSA: 32 ML/M2
ECHO RA VOLUME BIPLANE METHOD OF DISKS: 60 ML
ECHO RA VOLUME INDEX BP: 32 ML/M2
ECHO RA VOLUME: 60 ML
ECHO RV FREE WALL PEAK S': 11 CM/S
ECHO RV INTERNAL DIMENSION: 4.1 CM
ECHO RV TAPSE: 2.8 CM (ref 1.7–?)
ECHO RVOT MEAN GRADIENT: 2 MMHG
ECHO RVOT PEAK GRADIENT: 3 MMHG
ECHO RVOT PEAK VELOCITY: 0.9 M/S
ECHO RVOT VTI: 17.1 CM
EOSINOPHIL # BLD: 0.2 K/UL (ref 0–0.4)
EOSINOPHIL NFR BLD: 4 % (ref 0–5)
ERYTHROCYTE [DISTWIDTH] IN BLOOD BY AUTOMATED COUNT: 18.4 % (ref 11.6–14.5)
GLOBULIN SER CALC-MCNC: 4.7 G/DL (ref 2–4)
GLUCOSE SERPL-MCNC: 86 MG/DL (ref 74–99)
HCT VFR BLD AUTO: 26.7 % (ref 36–48)
HGB BLD-MCNC: 9.1 G/DL (ref 13–16)
IMM GRANULOCYTES # BLD AUTO: 0 K/UL (ref 0–0.04)
IMM GRANULOCYTES NFR BLD AUTO: 0 % (ref 0–0.5)
LYMPHOCYTES # BLD: 0.7 K/UL (ref 0.9–3.6)
LYMPHOCYTES NFR BLD: 14 % (ref 21–52)
MCH RBC QN AUTO: 27 PG (ref 24–34)
MCHC RBC AUTO-ENTMCNC: 34.1 G/DL (ref 31–37)
MCV RBC AUTO: 79.2 FL (ref 78–100)
MONOCYTES # BLD: 0.5 K/UL (ref 0.05–1.2)
MONOCYTES NFR BLD: 10 % (ref 3–10)
NEUTS SEG # BLD: 3.4 K/UL (ref 1.8–8)
NEUTS SEG NFR BLD: 71 % (ref 40–73)
NRBC # BLD: 0 K/UL (ref 0–0.01)
NRBC BLD-RTO: 0 PER 100 WBC
PLATELET # BLD AUTO: 139 K/UL (ref 135–420)
POTASSIUM SERPL-SCNC: 3.7 MMOL/L (ref 3.5–5.5)
PROT SERPL-MCNC: 7.4 G/DL (ref 6.4–8.2)
RBC # BLD AUTO: 3.37 M/UL (ref 4.35–5.65)
SODIUM SERPL-SCNC: 135 MMOL/L (ref 136–145)
TROPONIN I SERPL HS-MCNC: 25 NG/L (ref 0–78)
TROPONIN I SERPL HS-MCNC: 27 NG/L (ref 0–78)
TROPONIN I SERPL HS-MCNC: 28 NG/L (ref 0–78)
TROPONIN I SERPL HS-MCNC: 29 NG/L (ref 0–78)
WBC # BLD AUTO: 4.8 K/UL (ref 4.6–13.2)

## 2023-04-23 PROCEDURE — 6370000000 HC RX 637 (ALT 250 FOR IP): Performed by: HOSPITALIST

## 2023-04-23 PROCEDURE — 93306 TTE W/DOPPLER COMPLETE: CPT

## 2023-04-23 PROCEDURE — 6370000000 HC RX 637 (ALT 250 FOR IP): Performed by: STUDENT IN AN ORGANIZED HEALTH CARE EDUCATION/TRAINING PROGRAM

## 2023-04-23 PROCEDURE — 6370000000 HC RX 637 (ALT 250 FOR IP): Performed by: INTERNAL MEDICINE

## 2023-04-23 PROCEDURE — 1100000000 HC RM PRIVATE

## 2023-04-23 PROCEDURE — 6360000002 HC RX W HCPCS: Performed by: FAMILY MEDICINE

## 2023-04-23 PROCEDURE — 36415 COLL VENOUS BLD VENIPUNCTURE: CPT

## 2023-04-23 PROCEDURE — 85025 COMPLETE CBC W/AUTO DIFF WBC: CPT

## 2023-04-23 PROCEDURE — 80053 COMPREHEN METABOLIC PANEL: CPT

## 2023-04-23 PROCEDURE — 2700000000 HC OXYGEN THERAPY PER DAY

## 2023-04-23 PROCEDURE — 84484 ASSAY OF TROPONIN QUANT: CPT

## 2023-04-23 PROCEDURE — 2580000003 HC RX 258: Performed by: FAMILY MEDICINE

## 2023-04-23 PROCEDURE — 6370000000 HC RX 637 (ALT 250 FOR IP): Performed by: FAMILY MEDICINE

## 2023-04-23 RX ORDER — HYDRALAZINE HYDROCHLORIDE 20 MG/ML
10 INJECTION INTRAMUSCULAR; INTRAVENOUS EVERY 6 HOURS PRN
Status: DISCONTINUED | OUTPATIENT
Start: 2023-04-23 | End: 2023-04-25 | Stop reason: HOSPADM

## 2023-04-23 RX ORDER — LOPERAMIDE HYDROCHLORIDE 2 MG/1
2 CAPSULE ORAL 4 TIMES DAILY PRN
Status: DISCONTINUED | OUTPATIENT
Start: 2023-04-23 | End: 2023-04-25 | Stop reason: HOSPADM

## 2023-04-23 RX ORDER — HYDRALAZINE HYDROCHLORIDE 50 MG/1
50 TABLET, FILM COATED ORAL EVERY 8 HOURS SCHEDULED
Status: DISCONTINUED | OUTPATIENT
Start: 2023-04-23 | End: 2023-04-25 | Stop reason: HOSPADM

## 2023-04-23 RX ADMIN — AMLODIPINE BESYLATE 10 MG: 5 TABLET ORAL at 08:38

## 2023-04-23 RX ADMIN — SODIUM CHLORIDE, PRESERVATIVE FREE 10 ML: 5 INJECTION INTRAVENOUS at 08:44

## 2023-04-23 RX ADMIN — ASPIRIN 81 MG: 81 TABLET, COATED ORAL at 08:38

## 2023-04-23 RX ADMIN — CALCITRIOL 0.25 MCG: 0.25 CAPSULE ORAL at 08:38

## 2023-04-23 RX ADMIN — NITROGLYCERIN 1 INCH: 20 OINTMENT TOPICAL at 05:57

## 2023-04-23 RX ADMIN — HYDRALAZINE HYDROCHLORIDE 25 MG: 25 TABLET, FILM COATED ORAL at 05:57

## 2023-04-23 RX ADMIN — HEPARIN SODIUM 5000 UNITS: 5000 INJECTION INTRAVENOUS; SUBCUTANEOUS at 05:57

## 2023-04-23 RX ADMIN — CYANOCOBALAMIN TAB 1000 MCG 1000 MCG: 1000 TAB at 08:38

## 2023-04-23 RX ADMIN — SEVELAMER CARBONATE 800 MG: 800 TABLET, FILM COATED ORAL at 12:05

## 2023-04-23 RX ADMIN — HEPARIN SODIUM 5000 UNITS: 5000 INJECTION INTRAVENOUS; SUBCUTANEOUS at 14:45

## 2023-04-23 RX ADMIN — LOPERAMIDE HYDROCHLORIDE 2 MG: 2 CAPSULE ORAL at 19:09

## 2023-04-23 RX ADMIN — HEPARIN SODIUM 5000 UNITS: 5000 INJECTION INTRAVENOUS; SUBCUTANEOUS at 22:52

## 2023-04-23 RX ADMIN — LOSARTAN POTASSIUM 100 MG: 50 TABLET, FILM COATED ORAL at 08:38

## 2023-04-23 RX ADMIN — SEVELAMER CARBONATE 800 MG: 800 TABLET, FILM COATED ORAL at 08:38

## 2023-04-23 RX ADMIN — LABETALOL HYDROCHLORIDE 200 MG: 200 TABLET, FILM COATED ORAL at 22:49

## 2023-04-23 RX ADMIN — HYDRALAZINE HYDROCHLORIDE 50 MG: 50 TABLET, FILM COATED ORAL at 22:49

## 2023-04-23 RX ADMIN — LABETALOL HYDROCHLORIDE 200 MG: 200 TABLET, FILM COATED ORAL at 08:38

## 2023-04-23 RX ADMIN — SPIRONOLACTONE 100 MG: 100 TABLET ORAL at 08:38

## 2023-04-23 ASSESSMENT — PAIN SCALES - GENERAL
PAINLEVEL_OUTOF10: 0
PAINLEVEL_OUTOF10: 0

## 2023-04-24 ENCOUNTER — APPOINTMENT (OUTPATIENT)
Facility: HOSPITAL | Age: 53
DRG: 304 | End: 2023-04-24
Payer: MEDICARE

## 2023-04-24 VITALS
BODY MASS INDEX: 26.37 KG/M2 | DIASTOLIC BLOOD PRESSURE: 67 MMHG | OXYGEN SATURATION: 100 % | HEIGHT: 67 IN | RESPIRATION RATE: 18 BRPM | TEMPERATURE: 97.5 F | WEIGHT: 168 LBS | HEART RATE: 89 BPM | SYSTOLIC BLOOD PRESSURE: 168 MMHG

## 2023-04-24 PROBLEM — Z86.16 HISTORY OF COVID-19: Status: RESOLVED | Noted: 2022-08-27 | Resolved: 2023-04-24

## 2023-04-24 PROBLEM — Z49.01 ADMISSION FOR FITTING AND ADJUSTMENT OF DIALYSIS CATHETER (HCC): Status: RESOLVED | Noted: 2023-02-06 | Resolved: 2023-04-24

## 2023-04-24 PROBLEM — I10 HTN (HYPERTENSION): Status: RESOLVED | Noted: 2020-01-29 | Resolved: 2023-04-24

## 2023-04-24 PROBLEM — G92.8 TOXIC METABOLIC ENCEPHALOPATHY: Status: RESOLVED | Noted: 2023-04-05 | Resolved: 2023-04-24

## 2023-04-24 PROBLEM — T82.7XXA ARTERIOVENOUS GRAFT INFECTION (HCC): Status: RESOLVED | Noted: 2022-06-22 | Resolved: 2023-04-24

## 2023-04-24 LAB
ALBUMIN SERPL-MCNC: 2.8 G/DL (ref 3.4–5)
ALBUMIN/GLOB SERPL: 0.6 (ref 0.8–1.7)
ALP SERPL-CCNC: 105 U/L (ref 45–117)
ALT SERPL-CCNC: 11 U/L (ref 16–61)
ANION GAP SERPL CALC-SCNC: 6 MMOL/L (ref 3–18)
AST SERPL-CCNC: 9 U/L (ref 10–38)
BASOPHILS # BLD: 0 K/UL (ref 0–0.1)
BASOPHILS NFR BLD: 1 % (ref 0–2)
BILIRUB SERPL-MCNC: 0.5 MG/DL (ref 0.2–1)
BUN SERPL-MCNC: 39 MG/DL (ref 7–18)
BUN/CREAT SERPL: 6 (ref 12–20)
CALCIUM SERPL-MCNC: 7.9 MG/DL (ref 8.5–10.1)
CHLORIDE SERPL-SCNC: 106 MMOL/L (ref 100–111)
CO2 SERPL-SCNC: 25 MMOL/L (ref 21–32)
CREAT SERPL-MCNC: 6.48 MG/DL (ref 0.6–1.3)
DIFFERENTIAL METHOD BLD: ABNORMAL
EOSINOPHIL # BLD: 0.2 K/UL (ref 0–0.4)
EOSINOPHIL NFR BLD: 4 % (ref 0–5)
ERYTHROCYTE [DISTWIDTH] IN BLOOD BY AUTOMATED COUNT: 18.3 % (ref 11.6–14.5)
GLOBULIN SER CALC-MCNC: 5 G/DL (ref 2–4)
GLUCOSE BLD STRIP.AUTO-MCNC: 91 MG/DL (ref 70–110)
GLUCOSE SERPL-MCNC: 135 MG/DL (ref 74–99)
HCT VFR BLD AUTO: 28 % (ref 36–48)
HGB BLD-MCNC: 9.3 G/DL (ref 13–16)
IMM GRANULOCYTES # BLD AUTO: 0 K/UL (ref 0–0.04)
IMM GRANULOCYTES NFR BLD AUTO: 0 % (ref 0–0.5)
LYMPHOCYTES # BLD: 0.7 K/UL (ref 0.9–3.6)
LYMPHOCYTES NFR BLD: 15 % (ref 21–52)
MCH RBC QN AUTO: 26.7 PG (ref 24–34)
MCHC RBC AUTO-ENTMCNC: 33.2 G/DL (ref 31–37)
MCV RBC AUTO: 80.5 FL (ref 78–100)
MONOCYTES # BLD: 0.5 K/UL (ref 0.05–1.2)
MONOCYTES NFR BLD: 11 % (ref 3–10)
NEUTS SEG # BLD: 3.2 K/UL (ref 1.8–8)
NEUTS SEG NFR BLD: 70 % (ref 40–73)
NRBC # BLD: 0 K/UL (ref 0–0.01)
NRBC BLD-RTO: 0 PER 100 WBC
PLATELET # BLD AUTO: 171 K/UL (ref 135–420)
PMV BLD AUTO: 10 FL (ref 9.2–11.8)
POTASSIUM SERPL-SCNC: 3.9 MMOL/L (ref 3.5–5.5)
PROT SERPL-MCNC: 7.8 G/DL (ref 6.4–8.2)
RBC # BLD AUTO: 3.48 M/UL (ref 4.35–5.65)
SODIUM SERPL-SCNC: 137 MMOL/L (ref 136–145)
TROPONIN I SERPL HS-MCNC: 25 NG/L (ref 0–78)
WBC # BLD AUTO: 4.6 K/UL (ref 4.6–13.2)

## 2023-04-24 PROCEDURE — 85025 COMPLETE CBC W/AUTO DIFF WBC: CPT

## 2023-04-24 PROCEDURE — 6370000000 HC RX 637 (ALT 250 FOR IP): Performed by: INTERNAL MEDICINE

## 2023-04-24 PROCEDURE — 97602 WOUND(S) CARE NON-SELECTIVE: CPT

## 2023-04-24 PROCEDURE — 36415 COLL VENOUS BLD VENIPUNCTURE: CPT

## 2023-04-24 PROCEDURE — 90935 HEMODIALYSIS ONE EVALUATION: CPT

## 2023-04-24 PROCEDURE — 71045 X-RAY EXAM CHEST 1 VIEW: CPT

## 2023-04-24 PROCEDURE — 84484 ASSAY OF TROPONIN QUANT: CPT

## 2023-04-24 PROCEDURE — 2580000003 HC RX 258: Performed by: FAMILY MEDICINE

## 2023-04-24 PROCEDURE — 80053 COMPREHEN METABOLIC PANEL: CPT

## 2023-04-24 PROCEDURE — 6370000000 HC RX 637 (ALT 250 FOR IP): Performed by: STUDENT IN AN ORGANIZED HEALTH CARE EDUCATION/TRAINING PROGRAM

## 2023-04-24 PROCEDURE — 82962 GLUCOSE BLOOD TEST: CPT

## 2023-04-24 PROCEDURE — 6370000000 HC RX 637 (ALT 250 FOR IP): Performed by: FAMILY MEDICINE

## 2023-04-24 PROCEDURE — 6370000000 HC RX 637 (ALT 250 FOR IP): Performed by: HOSPITALIST

## 2023-04-24 PROCEDURE — 6360000002 HC RX W HCPCS: Performed by: FAMILY MEDICINE

## 2023-04-24 RX ORDER — ISOSORBIDE MONONITRATE 60 MG/1
60 TABLET, EXTENDED RELEASE ORAL DAILY
Qty: 30 TABLET | Refills: 1 | Status: SHIPPED | OUTPATIENT
Start: 2023-04-24

## 2023-04-24 RX ORDER — CLONIDINE 0.3 MG/24H
1 PATCH, EXTENDED RELEASE TRANSDERMAL WEEKLY
Qty: 4 PATCH | Refills: 1 | Status: SHIPPED | OUTPATIENT
Start: 2023-04-29

## 2023-04-24 RX ORDER — SPIRONOLACTONE 100 MG/1
100 TABLET, FILM COATED ORAL DAILY
Qty: 30 TABLET | Refills: 1 | Status: SHIPPED | OUTPATIENT
Start: 2023-04-24

## 2023-04-24 RX ORDER — DOXAZOSIN MESYLATE 4 MG/1
2 TABLET ORAL DAILY
Qty: 30 TABLET | Refills: 1 | Status: SHIPPED | OUTPATIENT
Start: 2023-04-24

## 2023-04-24 RX ORDER — LOSARTAN POTASSIUM 100 MG/1
100 TABLET ORAL DAILY
Qty: 30 TABLET | Refills: 1 | Status: SHIPPED | OUTPATIENT
Start: 2023-04-24

## 2023-04-24 RX ORDER — AMLODIPINE BESYLATE 10 MG/1
10 TABLET ORAL DAILY
Qty: 30 TABLET | Refills: 1 | Status: SHIPPED | OUTPATIENT
Start: 2023-04-24

## 2023-04-24 RX ORDER — HYDRALAZINE HYDROCHLORIDE 50 MG/1
50 TABLET, FILM COATED ORAL EVERY 8 HOURS SCHEDULED
Qty: 90 TABLET | Refills: 1 | Status: SHIPPED | OUTPATIENT
Start: 2023-04-24

## 2023-04-24 RX ORDER — LABETALOL 200 MG/1
200 TABLET, FILM COATED ORAL EVERY 12 HOURS SCHEDULED
Qty: 60 TABLET | Refills: 1 | Status: SHIPPED | OUTPATIENT
Start: 2023-04-24

## 2023-04-24 RX ORDER — ISOSORBIDE MONONITRATE 60 MG/1
60 TABLET, EXTENDED RELEASE ORAL DAILY
Status: DISCONTINUED | OUTPATIENT
Start: 2023-04-24 | End: 2023-04-25 | Stop reason: HOSPADM

## 2023-04-24 RX ADMIN — HEPARIN SODIUM 5000 UNITS: 5000 INJECTION INTRAVENOUS; SUBCUTANEOUS at 05:59

## 2023-04-24 RX ADMIN — HEPARIN SODIUM 5000 UNITS: 5000 INJECTION INTRAVENOUS; SUBCUTANEOUS at 13:30

## 2023-04-24 RX ADMIN — HYDRALAZINE HYDROCHLORIDE 50 MG: 50 TABLET, FILM COATED ORAL at 21:19

## 2023-04-24 RX ADMIN — SODIUM CHLORIDE, PRESERVATIVE FREE 10 ML: 5 INJECTION INTRAVENOUS at 17:44

## 2023-04-24 RX ADMIN — SEVELAMER CARBONATE 800 MG: 800 TABLET, FILM COATED ORAL at 13:28

## 2023-04-24 RX ADMIN — LOPERAMIDE HYDROCHLORIDE 2 MG: 2 CAPSULE ORAL at 17:25

## 2023-04-24 RX ADMIN — LABETALOL HYDROCHLORIDE 200 MG: 200 TABLET, FILM COATED ORAL at 21:19

## 2023-04-24 RX ADMIN — SEVELAMER CARBONATE 800 MG: 800 TABLET, FILM COATED ORAL at 17:25

## 2023-04-24 RX ADMIN — HYDRALAZINE HYDROCHLORIDE 50 MG: 50 TABLET, FILM COATED ORAL at 06:00

## 2023-04-24 RX ADMIN — NITROGLYCERIN 1 INCH: 20 OINTMENT TOPICAL at 06:00

## 2023-04-24 RX ADMIN — CYANOCOBALAMIN TAB 1000 MCG 1000 MCG: 1000 TAB at 13:28

## 2023-04-24 RX ADMIN — ASPIRIN 81 MG: 81 TABLET, COATED ORAL at 13:29

## 2023-04-24 NOTE — DISCHARGE SUMMARY
pleural effusions compared to 4/5/2023. There is no pneumothorax. The bones and upper abdomen are stable. Increased moderate pulmonary edema and small pleural effusions compared to 4/5/2023. XR CHEST PORTABLE    Result Date: 4/5/2023  INDICATION:  ams EXAM: Chest single view. COMPARISON: 2/6/2023. FINDINGS: A single frontal view of the chest at 1317 hours shows compromised image quality by nonstandard positioning. Soft tissue projects over both lung fields right greater than left, increasing apparent parenchymal opacity. Mild interstitial edema pattern is stable. Thought to be present. The heart, mediastinum and pulmonary vasculature are stable with mild cardiomegaly. Double-lumen catheter on the left is stable. .  The bony thorax is unremarkable for age. .     Mild interstitial edema pattern. . Technically compromised image quality. .     Transthoracic echocardiogram (TTE) complete with contrast, bubble, strain, and 3D PRN    Result Date: 4/23/2023    Left Ventricle: Normal left ventricular systolic function with a visually estimated EF of 55 - 60%. Left ventricle size is normal. Mildly increased wall thickness. Findings consistent with mild concentric hypertrophy. Normal wall motion. Grade I diastolic dysfunction present with normal LV EF. Left Atrium: Left atrium is mildly dilated. Mitral Valve: Mildly thickened leaflet. Moderate annular calcification at the posterior leaflet of the mitral valve. No regurgitation. No stenosis noted. Tricuspid Valve: Unable to assess RVSP due to inadequate or insignificant tricuspid regurgitation.              [facility]     CC: Christie Benz MD

## 2023-04-24 NOTE — CONSULTS
followed by Dr. Zelda Bullard for wound care. Wound to left heel cleansed and gael applied to wound along, with gauze,kerlex and ace wrap. Please leave dressing intact.   Wound care will follow pt while in the hospital.    Teaching completed with:   [x] Patient           [] Family member       [] Caregiver          [] Nursing  [] Other    Patient/Caregiver Teaching:  Level of patient/caregiver understanding able to:   [x] Indicates understanding       [] Needs reinforcement  [] Unsuccessful      [] Verbal Understanding  [] Demonstrated understanding       [] No evidence of learning  [] Refused teaching         [] N/A       Electronically signed by Eun Varela RN on 4/24/2023 at 1:50 PM

## 2023-04-24 NOTE — DIALYSIS
04/24/23  Dialysis treatment    Treatment time-3.5hrs  Fluid removed-2.2 kg  BVP-66.9  Medications given-none  Hepatitis Status-NEG AG, AB SUCC 4/6/23  Diagnosis-Respiratory failure      Access-  Lt CVC,dressing c/d/I from 4/21/23. Ports cleaned, blood aspirated and lines flushed without any issues. Good blood flow, no infection noted    Treatment-    0933 Dialysis started  1200 b/p trending down 94/33 p88, 100ml NS given Uf turned off.  1210 b/p still low 62/48 p 78, another 200ml NS given and nitropaste removed from pt chest  1215 134/65 p 85 b/p now, pt feeling alittle better  1250 Dialysis completed and blood rinsed back.  New caps placed on each port, pt stable    Pre and Post Report given  César Dumsa RN

## 2023-04-24 NOTE — PLAN OF CARE
Problem: Discharge Planning  Goal: Discharge to home or other facility with appropriate resources  Outcome: Adequate for Discharge     Problem: Safety - Adult  Goal: Free from fall injury  4/24/2023 1851 by Renetta Dorman RN  Outcome: Adequate for Discharge  4/24/2023 1827 by Renetta Dorman RN  Outcome: Progressing     Problem: Skin/Tissue Integrity  Goal: Absence of new skin breakdown  Description: 1. Monitor for areas of redness and/or skin breakdown  2. Assess vascular access sites hourly  3. Every 4-6 hours minimum:  Change oxygen saturation probe site  4. Every 4-6 hours:  If on nasal continuous positive airway pressure, respiratory therapy assess nares and determine need for appliance change or resting period.   4/24/2023 1851 by Renetta Dorman RN  Outcome: Adequate for Discharge  4/24/2023 1827 by Renetta Dorman RN  Outcome: Progressing     Problem: Pain  Goal: Verbalizes/displays adequate comfort level or baseline comfort level  4/24/2023 1851 by Renetta Dorman RN  Outcome: Adequate for Discharge  4/24/2023 1827 by Renetta Dorman RN  Outcome: Progressing     Problem: Chronic Conditions and Co-morbidities  Goal: Patient's chronic conditions and co-morbidity symptoms are monitored and maintained or improved  Outcome: Adequate for Discharge

## 2023-04-26 LAB
EKG ATRIAL RATE: 82 BPM
EKG DIAGNOSIS: NORMAL
EKG P AXIS: 44 DEGREES
EKG P-R INTERVAL: 176 MS
EKG Q-T INTERVAL: 418 MS
EKG QRS DURATION: 84 MS
EKG QTC CALCULATION (BAZETT): 488 MS
EKG R AXIS: 10 DEGREES
EKG T AXIS: 110 DEGREES
EKG VENTRICULAR RATE: 82 BPM

## 2023-04-26 NOTE — PROGRESS NOTES
4/24/2023 PT note: consult received and chart reviewed. Pt receiving HD treatment this am.  Evaluation attempted. Pt declined requesting PT to come back tomorrow, c/o weakness and fatigue at this time. BP  112/66, HR 91. Nurse Zina Vega notified of above.    Will f/u tomorrow nancy PT evaluation, per pt request. Thank you for this referral.   Frances, PT
Assumed care of patient in bed on bedpan. Removed from pan and patient requesting pants for discharged. Down to ICU for disposables and assisted with discharge. Patient verbalized discharge instructions. No s/s of distress. PIV removed. Discharged in wheelchair, no s/s of discomfort or distress. Down to car and assisted while patient put personal belongings in car.
Physician Progress Note      PATIENT:               Bessy Cruz  CSN #:                  [id number]  :                       1970  ADMIT DATE:       2023 12:21 PM  Surya Washington DATE:        2023 10:30 PM  RESPONDING  PROVIDER #:        Debora Persaud MD          QUERY TEXT:    Patient admitted with acute hypoxemic respiratory failure due to volume   overload and hypertensive urgency. Noted documentation of hypertensive urgency   in the H&P  and hypertensive emergency  noted by Pulmonology . If possible, please document in progress notes and discharge summary if you   are evaluating and /or treating any of the following: The medical record reflects the following:    Risk Factors: ESRD, HTN, Non-compliance with renal dialysis, Volume overload,   Chronic diastolic CHF    Clinical Indicators:  > Per H&P- Hypertensive urgency  > Per Pulmonary consult- Hypertensive emergency  > 23 /97, 225/96, 196/79, 219/112  > 23 /88, 222/91, 176/70    Treatment: Treated with IV hydralazine, Cardura, labetalol, Cardene, losartan,   Norvasc    Rachel Fountain RN, BSN, [place], [address] [place] [address] Road: [email or web address]  Options provided:  -- Hypertensive urgency confirmed and hypertensive emergency ruled out  -- Hypertensive emergency confirmed and hypertensive urgency ruled out  -- Hypertensive urgency and hypertensive emergency confirmed  -- Other - I will add my own diagnosis  -- Disagree - Not applicable / Not valid  -- Disagree - Clinically unable to determine / Unknown  -- Refer to Clinical Documentation Reviewer    PROVIDER RESPONSE TEXT:    After study, hypertensive emergency confirmed and hypertensive urgency ruled   out.     Query created by: Cata Rod on 2023 8:15 AM      Electronically signed by:  Debora Persaud MD 2023 10:12 AM
RENAL CONSULT PROGRESS NOTE   2023    Patient:  Lashawn Skinner  :  1970  Gender:  male  MRN #:  [id number]    Reason for Consult:  Pulmonary edema and Hypertensive urgency     Subjective/Interval events  Saw him during dialysis , tolerating fine   No dyspnea  BP at target   No other concern     Objective:    /70   Pulse 81   Temp 98 °F (36.7 °C)   Resp 20   Ht 5' 7\" (1.702 m)   Wt 168 lb (76.2 kg)   SpO2 99%   BMI 26.31 kg/m²     Physical Exam:    Pt awake,  alert and comfortable  Ext: no edema in Left, RLE BKA  and pulsation intacT  CNS- Oriented to time , place and person     Laboratory Data:    Lab Results   Component Value Date    BUN 39 (H) 2023    BUN 25 (H) 2023    BUN 39 (H) 2023     2023     (L) 2023     2023    CO2 25 2023    CO2 28 2023    CO2 24 2023    GLUCOSE 135 (H) 2023    GLUCOSE 86 2023    GLUCOSE 78 2023     Lab Results   Component Value Date    WBC 4.6 2023    HGB 9.3 (L) 2023    HCT 28.0 (L) 2023     Lab Results   Component Value Date    CALCIUM 7.9 (L) 2023     Lab Results   Component Value Date    HDL 62 (H) 2022     No results found for: SPECIMENTYP, TURBIDITY    Imaging Reveiwed:   Diffuse airspace disease, most suggestive of pulmonary edema with small   bilateral pleural transudates       No infiltrate         Assessment:    Lashawn Skinner is a 50y.o. year old male ESRD on HD,  DM, anemia, uncontrolled hypertension , hyperphosphatemia non compliance to dialysis prescription, dietary/life style modification and medications. He  has  had recurrent admission for same problems  because of missing and inadequate dialysis ,  even if he shows up he cut short  his treatment time.   Was admitted for hypertensive urgency, hypoxia and pulmonary edema due to non compliance with Dialysis and medicine   Is improving now          Hypertensive urgency- improving
disease. There is no intracranial hemorrhage, extra-axial collection, or mass effect. Stable appearing hypodensities are seen in the RIGHT aspect of the rajwinder and the RIGHT cerebellum consistent with old infarcts. No CT evidence of acute infarct. The bone windows demonstrate no abnormalities. The visualized portions of the paranasal sinuses and mastoid air cells are clear. No acute intracranial abnormality. No significant interval change. CTA CHEST W WO CONTRAST  Result Date: 4/22/2023  EXAM:  CTA CHEST W WO CONTRAST INDICATION: Hypertension. Evaluation for pulmonary embolism COMPARISON: None. TECHNIQUE: Helical thin section chest CT following uneventful intravenous administration of nonionic contrast 100 mL of isovue 370 according to departmental PE protocol. Coronal and sagittal reformats were performed. 3D post processing was performed. CT dose reduction was achieved through the use of a standardized protocol tailored for this examination and automatic exposure control for dose modulation. FINDINGS: This is a good quality study for the evaluation of pulmonary embolism to the first subsegmental arterial level. There is no pulmonary embolism to this level. THYROID: No nodule. MEDIASTINUM: No mass or lymphadenopathy. SOMMER: No mass or lymphadenopathy. THORACIC AORTA: No aneurysm. HEART: Mild cardiomegaly. Heavy coronary artery calcification. ESOPHAGUS: No wall thickening or dilatation. TRACHEA/BRONCHI: Patent. PLEURA: Small bilateral pleural transudates LUNGS: There is diffuse airspace disease throughout the lungs with relative sparing of the left base. There are no nodules or infiltrates are seen. UPPER ABDOMEN: Partially imaged. No acute pathology. BONES: No aggressive bone lesion or fracture.    Diffuse airspace disease, most suggestive of pulmonary edema with small bilateral pleural transudates No infiltrate    XR CHEST PORTABLE  Result Date: 4/21/2023  EXAM: XR CHEST PORTABLE INDICATION: post dialysis

## 2023-05-08 ENCOUNTER — APPOINTMENT (OUTPATIENT)
Facility: HOSPITAL | Age: 53
DRG: 291 | End: 2023-05-08
Payer: MEDICARE

## 2023-05-08 ENCOUNTER — HOSPITAL ENCOUNTER (INPATIENT)
Facility: HOSPITAL | Age: 53
LOS: 9 days | Discharge: HOME HEALTH | DRG: 291 | End: 2023-05-17
Attending: EMERGENCY MEDICINE | Admitting: FAMILY MEDICINE
Payer: MEDICARE

## 2023-05-08 DIAGNOSIS — N18.6 ESRD (END STAGE RENAL DISEASE) ON DIALYSIS (HCC): ICD-10-CM

## 2023-05-08 DIAGNOSIS — R94.31 ABNORMAL EKG: ICD-10-CM

## 2023-05-08 DIAGNOSIS — E87.79 OTHER HYPERVOLEMIA: ICD-10-CM

## 2023-05-08 DIAGNOSIS — R06.02 SHORTNESS OF BREATH: ICD-10-CM

## 2023-05-08 DIAGNOSIS — Z99.2 ESRD (END STAGE RENAL DISEASE) ON DIALYSIS (HCC): ICD-10-CM

## 2023-05-08 DIAGNOSIS — I16.0 HYPERTENSIVE URGENCY: Primary | ICD-10-CM

## 2023-05-08 PROBLEM — R09.02 HYPOXIA: Status: ACTIVE | Noted: 2023-05-08

## 2023-05-08 LAB
ALBUMIN SERPL-MCNC: 3.1 G/DL (ref 3.4–5)
ALBUMIN/GLOB SERPL: 0.6 (ref 0.8–1.7)
ALP SERPL-CCNC: 110 U/L (ref 45–117)
ALT SERPL-CCNC: 35 U/L (ref 16–61)
ANION GAP BLD CALC-SCNC: ABNORMAL (ref 10–20)
ANION GAP SERPL CALC-SCNC: 12 MMOL/L (ref 3–18)
ARTERIAL PATENCY WRIST A: POSITIVE
AST SERPL-CCNC: 22 U/L (ref 10–38)
BASE DEFICIT BLD-SCNC: 9.3 MMOL/L
BASOPHILS # BLD: 0 K/UL (ref 0–0.1)
BASOPHILS NFR BLD: 1 % (ref 0–2)
BDY SITE: ABNORMAL
BILIRUB SERPL-MCNC: 0.5 MG/DL (ref 0.2–1)
BUN SERPL-MCNC: 110 MG/DL (ref 7–18)
BUN/CREAT SERPL: 9 (ref 12–20)
CA-I BLD-MCNC: 0.98 MMOL/L (ref 1.12–1.32)
CALCIUM SERPL-MCNC: 7.7 MG/DL (ref 8.5–10.1)
CHLORIDE BLD-SCNC: 111 MMOL/L (ref 98–107)
CHLORIDE SERPL-SCNC: 105 MMOL/L (ref 100–111)
CO2 BLD-SCNC: 15 MMOL/L (ref 19–24)
CO2 SERPL-SCNC: 17 MMOL/L (ref 21–32)
CREAT BLD-MCNC: 13.08 MG/DL (ref 0.6–1.3)
CREAT SERPL-MCNC: 12.2 MG/DL (ref 0.6–1.3)
DIFFERENTIAL METHOD BLD: ABNORMAL
EOSINOPHIL # BLD: 0.1 K/UL (ref 0–0.4)
EOSINOPHIL NFR BLD: 2 % (ref 0–5)
ERYTHROCYTE [DISTWIDTH] IN BLOOD BY AUTOMATED COUNT: 18.6 % (ref 11.6–14.5)
FIO2 ON VENT: 3 %
GAS FLOW.O2 O2 DELIVERY SYS: ABNORMAL
GLOBULIN SER CALC-MCNC: 5.4 G/DL (ref 2–4)
GLUCOSE BLD STRIP.AUTO-MCNC: 108 MG/DL (ref 70–110)
GLUCOSE BLD STRIP.AUTO-MCNC: 89 MG/DL (ref 70–110)
GLUCOSE BLD-MCNC: 77 MG/DL (ref 65–100)
GLUCOSE SERPL-MCNC: 82 MG/DL (ref 74–99)
HCO3 BLD-SCNC: 15.7 MMOL/L (ref 22–26)
HCT VFR BLD AUTO: 20.7 % (ref 36–48)
HGB BLD-MCNC: 7 G/DL (ref 13–16)
IMM GRANULOCYTES # BLD AUTO: 0 K/UL (ref 0–0.04)
IMM GRANULOCYTES NFR BLD AUTO: 0 % (ref 0–0.5)
LACTATE BLD-SCNC: 0.55 MMOL/L (ref 0.4–2)
LACTATE BLD-SCNC: 0.57 MMOL/L (ref 0.4–2)
LYMPHOCYTES # BLD: 0.5 K/UL (ref 0.9–3.6)
LYMPHOCYTES NFR BLD: 9 % (ref 21–52)
MAGNESIUM SERPL-MCNC: 2.8 MG/DL (ref 1.6–2.6)
MCH RBC QN AUTO: 26.7 PG (ref 24–34)
MCHC RBC AUTO-ENTMCNC: 33.8 G/DL (ref 31–37)
MCV RBC AUTO: 79 FL (ref 78–100)
MONOCYTES # BLD: 0.5 K/UL (ref 0.05–1.2)
MONOCYTES NFR BLD: 9 % (ref 3–10)
NEUTS SEG # BLD: 4.6 K/UL (ref 1.8–8)
NEUTS SEG NFR BLD: 79 % (ref 40–73)
NRBC # BLD: 0 K/UL (ref 0–0.01)
NRBC BLD-RTO: 0 PER 100 WBC
PCO2 BLD: 30.2 MMHG (ref 35–45)
PH BLD: 7.33 (ref 7.35–7.45)
PLATELET # BLD AUTO: 113 K/UL (ref 135–420)
PO2 BLD: 72 MMHG (ref 80–100)
POTASSIUM BLD-SCNC: 5.9 MMOL/L (ref 3.5–5.1)
POTASSIUM SERPL-SCNC: 6.1 MMOL/L (ref 3.5–5.5)
PROT SERPL-MCNC: 8.5 G/DL (ref 6.4–8.2)
RBC # BLD AUTO: 2.62 M/UL (ref 4.35–5.65)
SAO2 % BLD: 93 %
SERVICE CMNT-IMP: ABNORMAL
SODIUM BLD-SCNC: 136 MMOL/L (ref 136–145)
SODIUM SERPL-SCNC: 134 MMOL/L (ref 136–145)
SPECIMEN SITE: ABNORMAL
TROPONIN I SERPL HS-MCNC: 21 NG/L (ref 0–78)
WBC # BLD AUTO: 5.8 K/UL (ref 4.6–13.2)

## 2023-05-08 PROCEDURE — 93005 ELECTROCARDIOGRAM TRACING: CPT | Performed by: EMERGENCY MEDICINE

## 2023-05-08 PROCEDURE — 1100000003 HC PRIVATE W/ TELEMETRY

## 2023-05-08 PROCEDURE — 84132 ASSAY OF SERUM POTASSIUM: CPT

## 2023-05-08 PROCEDURE — 94762 N-INVAS EAR/PLS OXIMTRY CONT: CPT

## 2023-05-08 PROCEDURE — 5A1D70Z PERFORMANCE OF URINARY FILTRATION, INTERMITTENT, LESS THAN 6 HOURS PER DAY: ICD-10-PCS | Performed by: STUDENT IN AN ORGANIZED HEALTH CARE EDUCATION/TRAINING PROGRAM

## 2023-05-08 PROCEDURE — 90935 HEMODIALYSIS ONE EVALUATION: CPT

## 2023-05-08 PROCEDURE — 80053 COMPREHEN METABOLIC PANEL: CPT

## 2023-05-08 PROCEDURE — 6370000000 HC RX 637 (ALT 250 FOR IP): Performed by: STUDENT IN AN ORGANIZED HEALTH CARE EDUCATION/TRAINING PROGRAM

## 2023-05-08 PROCEDURE — 84295 ASSAY OF SERUM SODIUM: CPT

## 2023-05-08 PROCEDURE — 82330 ASSAY OF CALCIUM: CPT

## 2023-05-08 PROCEDURE — 87040 BLOOD CULTURE FOR BACTERIA: CPT

## 2023-05-08 PROCEDURE — 6370000000 HC RX 637 (ALT 250 FOR IP): Performed by: FAMILY MEDICINE

## 2023-05-08 PROCEDURE — 36415 COLL VENOUS BLD VENIPUNCTURE: CPT

## 2023-05-08 PROCEDURE — 82947 ASSAY GLUCOSE BLOOD QUANT: CPT

## 2023-05-08 PROCEDURE — 85025 COMPLETE CBC W/AUTO DIFF WBC: CPT

## 2023-05-08 PROCEDURE — 82962 GLUCOSE BLOOD TEST: CPT

## 2023-05-08 PROCEDURE — 82803 BLOOD GASES ANY COMBINATION: CPT

## 2023-05-08 PROCEDURE — 6370000000 HC RX 637 (ALT 250 FOR IP): Performed by: EMERGENCY MEDICINE

## 2023-05-08 PROCEDURE — 71045 X-RAY EXAM CHEST 1 VIEW: CPT

## 2023-05-08 PROCEDURE — 84484 ASSAY OF TROPONIN QUANT: CPT

## 2023-05-08 PROCEDURE — 83605 ASSAY OF LACTIC ACID: CPT

## 2023-05-08 PROCEDURE — 99285 EMERGENCY DEPT VISIT HI MDM: CPT

## 2023-05-08 PROCEDURE — 2500000003 HC RX 250 WO HCPCS: Performed by: EMERGENCY MEDICINE

## 2023-05-08 PROCEDURE — 83735 ASSAY OF MAGNESIUM: CPT

## 2023-05-08 RX ORDER — LOSARTAN POTASSIUM 50 MG/1
100 TABLET ORAL DAILY
Status: DISCONTINUED | OUTPATIENT
Start: 2023-05-09 | End: 2023-05-09

## 2023-05-08 RX ORDER — LABETALOL HYDROCHLORIDE 5 MG/ML
20 INJECTION, SOLUTION INTRAVENOUS
Status: COMPLETED | OUTPATIENT
Start: 2023-05-08 | End: 2023-05-08

## 2023-05-08 RX ORDER — SPIRONOLACTONE 100 MG/1
100 TABLET, FILM COATED ORAL DAILY
Status: DISCONTINUED | OUTPATIENT
Start: 2023-05-09 | End: 2023-05-09

## 2023-05-08 RX ORDER — AMLODIPINE BESYLATE 5 MG/1
10 TABLET ORAL DAILY
Status: DISCONTINUED | OUTPATIENT
Start: 2023-05-09 | End: 2023-05-09

## 2023-05-08 RX ORDER — ASPIRIN 81 MG/1
81 TABLET ORAL DAILY
Status: DISCONTINUED | OUTPATIENT
Start: 2023-05-09 | End: 2023-05-17 | Stop reason: HOSPADM

## 2023-05-08 RX ORDER — ISOSORBIDE MONONITRATE 60 MG/1
60 TABLET, EXTENDED RELEASE ORAL DAILY
Status: DISCONTINUED | OUTPATIENT
Start: 2023-05-09 | End: 2023-05-09

## 2023-05-08 RX ORDER — NITROGLYCERIN 0.4 MG/1
0.4 TABLET SUBLINGUAL EVERY 5 MIN PRN
Status: DISCONTINUED | OUTPATIENT
Start: 2023-05-08 | End: 2023-05-17 | Stop reason: HOSPADM

## 2023-05-08 RX ORDER — LABETALOL 200 MG/1
200 TABLET, FILM COATED ORAL EVERY 12 HOURS SCHEDULED
Status: DISCONTINUED | OUTPATIENT
Start: 2023-05-08 | End: 2023-05-17 | Stop reason: HOSPADM

## 2023-05-08 RX ORDER — CLONIDINE 0.3 MG/24H
1 PATCH, EXTENDED RELEASE TRANSDERMAL WEEKLY
Status: DISCONTINUED | OUTPATIENT
Start: 2023-05-08 | End: 2023-05-17 | Stop reason: HOSPADM

## 2023-05-08 RX ORDER — CALCITRIOL 0.25 UG/1
0.5 CAPSULE, LIQUID FILLED ORAL DAILY
Status: DISCONTINUED | OUTPATIENT
Start: 2023-05-08 | End: 2023-05-17 | Stop reason: HOSPADM

## 2023-05-08 RX ORDER — SEVELAMER CARBONATE 800 MG/1
1600 TABLET, FILM COATED ORAL
Status: DISCONTINUED | OUTPATIENT
Start: 2023-05-08 | End: 2023-05-17 | Stop reason: HOSPADM

## 2023-05-08 RX ORDER — DOXAZOSIN MESYLATE 1 MG/1
2 TABLET ORAL DAILY
Status: DISCONTINUED | OUTPATIENT
Start: 2023-05-09 | End: 2023-05-09

## 2023-05-08 RX ADMIN — CALCITRIOL CAPSULES 0.25 MCG 0.5 MCG: 0.25 CAPSULE ORAL at 16:24

## 2023-05-08 RX ADMIN — LABETALOL HYDROCHLORIDE 20 MG: 5 INJECTION, SOLUTION INTRAVENOUS at 16:25

## 2023-05-08 RX ADMIN — NITROGLYCERIN 0.4 MG: 0.4 TABLET, ORALLY DISINTEGRATING SUBLINGUAL at 13:55

## 2023-05-08 RX ADMIN — LABETALOL HYDROCHLORIDE 200 MG: 200 TABLET, FILM COATED ORAL at 23:15

## 2023-05-08 ASSESSMENT — PAIN SCALES - GENERAL: PAINLEVEL_OUTOF10: 0

## 2023-05-09 ENCOUNTER — APPOINTMENT (OUTPATIENT)
Facility: HOSPITAL | Age: 53
DRG: 291 | End: 2023-05-09
Payer: MEDICARE

## 2023-05-09 LAB
ANION GAP SERPL CALC-SCNC: 10 MMOL/L (ref 3–18)
BASOPHILS # BLD: 0 K/UL (ref 0–0.1)
BASOPHILS NFR BLD: 0 % (ref 0–2)
BUN SERPL-MCNC: 56 MG/DL (ref 7–18)
BUN/CREAT SERPL: 7 (ref 12–20)
CALCIUM SERPL-MCNC: 7.8 MG/DL (ref 8.5–10.1)
CHLORIDE SERPL-SCNC: 104 MMOL/L (ref 100–111)
CO2 SERPL-SCNC: 23 MMOL/L (ref 21–32)
CREAT SERPL-MCNC: 7.78 MG/DL (ref 0.6–1.3)
DIFFERENTIAL METHOD BLD: ABNORMAL
ECHO BSA: 1.99 M2
ECHO EST RA PRESSURE: 3 MMHG
ECHO LV E' LATERAL VELOCITY: 8 CM/S
ECHO LV E' SEPTAL VELOCITY: 7 CM/S
ECHO LV EDV A2C: 110 ML
ECHO LV EDV A4C: 136 ML
ECHO LV EDV BP: 124 ML (ref 67–155)
ECHO LV EDV INDEX A4C: 70 ML/M2
ECHO LV EDV INDEX BP: 64 ML/M2
ECHO LV EDV NDEX A2C: 56 ML/M2
ECHO LV EJECTION FRACTION A2C: 58 %
ECHO LV EJECTION FRACTION A4C: 63 %
ECHO LV EJECTION FRACTION BIPLANE: 60 % (ref 55–100)
ECHO LV ESV A2C: 46 ML
ECHO LV ESV A4C: 51 ML
ECHO LV ESV BP: 50 ML (ref 22–58)
ECHO LV ESV INDEX A2C: 24 ML/M2
ECHO LV ESV INDEX A4C: 26 ML/M2
ECHO LV ESV INDEX BP: 26 ML/M2
ECHO LV FRACTIONAL SHORTENING: 28 % (ref 28–44)
ECHO LV INTERNAL DIMENSION DIASTOLE INDEX: 2.56 CM/M2
ECHO LV INTERNAL DIMENSION DIASTOLIC: 5 CM (ref 4.2–5.9)
ECHO LV INTERNAL DIMENSION SYSTOLIC INDEX: 1.85 CM/M2
ECHO LV INTERNAL DIMENSION SYSTOLIC: 3.6 CM
ECHO LV IVSD: 1.2 CM (ref 0.6–1)
ECHO LV MASS 2D: 261.8 G (ref 88–224)
ECHO LV MASS INDEX 2D: 134.3 G/M2 (ref 49–115)
ECHO LV POSTERIOR WALL DIASTOLIC: 1.4 CM (ref 0.6–1)
ECHO LV RELATIVE WALL THICKNESS RATIO: 0.56
ECHO MV A VELOCITY: 0.01 M/S
ECHO MV A VELOCITY: 0.7 M/S
ECHO MV E DECELERATION TIME (DT): 186.9 MS
ECHO MV E VELOCITY: 1.38 M/S
ECHO MV E VELOCITY: 1.38 M/S
ECHO PULMONARY ARTERY SYSTOLIC PRESSURE (PASP): 27 MMHG
ECHO RIGHT VENTRICULAR SYSTOLIC PRESSURE (RVSP): 27 MMHG
ECHO TV REGURGITANT MAX VELOCITY: 2.44 M/S
ECHO TV REGURGITANT PEAK GRADIENT: 24 MMHG
EKG ATRIAL RATE: 88 BPM
EKG DIAGNOSIS: NORMAL
EKG P AXIS: 55 DEGREES
EKG P-R INTERVAL: 178 MS
EKG Q-T INTERVAL: 400 MS
EKG QRS DURATION: 92 MS
EKG QTC CALCULATION (BAZETT): 484 MS
EKG R AXIS: 4 DEGREES
EKG T AXIS: 93 DEGREES
EKG VENTRICULAR RATE: 88 BPM
EOSINOPHIL # BLD: 0.2 K/UL (ref 0–0.4)
EOSINOPHIL NFR BLD: 4 % (ref 0–5)
ERYTHROCYTE [DISTWIDTH] IN BLOOD BY AUTOMATED COUNT: 18.3 % (ref 11.6–14.5)
GLUCOSE BLD STRIP.AUTO-MCNC: 103 MG/DL (ref 70–110)
GLUCOSE BLD STRIP.AUTO-MCNC: 106 MG/DL (ref 70–110)
GLUCOSE SERPL-MCNC: 99 MG/DL (ref 74–99)
HCT VFR BLD AUTO: 19.4 % (ref 36–48)
HGB BLD-MCNC: 6.7 G/DL (ref 13–16)
IMM GRANULOCYTES # BLD AUTO: 0.1 K/UL (ref 0–0.04)
IMM GRANULOCYTES NFR BLD AUTO: 1 % (ref 0–0.5)
LYMPHOCYTES # BLD: 0.5 K/UL (ref 0.9–3.6)
LYMPHOCYTES NFR BLD: 9 % (ref 21–52)
MCH RBC QN AUTO: 27 PG (ref 24–34)
MCHC RBC AUTO-ENTMCNC: 34.5 G/DL (ref 31–37)
MCV RBC AUTO: 78.2 FL (ref 78–100)
MONOCYTES # BLD: 0.6 K/UL (ref 0.05–1.2)
MONOCYTES NFR BLD: 10 % (ref 3–10)
NEUTS SEG # BLD: 4.2 K/UL (ref 1.8–8)
NEUTS SEG NFR BLD: 76 % (ref 40–73)
NRBC # BLD: 0 K/UL (ref 0–0.01)
NRBC BLD-RTO: 0 PER 100 WBC
PLATELET # BLD AUTO: 114 K/UL (ref 135–420)
POTASSIUM SERPL-SCNC: 4.3 MMOL/L (ref 3.5–5.5)
RBC # BLD AUTO: 2.48 M/UL (ref 4.35–5.65)
SODIUM SERPL-SCNC: 137 MMOL/L (ref 136–145)
WBC # BLD AUTO: 5.6 K/UL (ref 4.6–13.2)

## 2023-05-09 PROCEDURE — 1100000003 HC PRIVATE W/ TELEMETRY

## 2023-05-09 PROCEDURE — 2700000000 HC OXYGEN THERAPY PER DAY

## 2023-05-09 PROCEDURE — 6370000000 HC RX 637 (ALT 250 FOR IP): Performed by: FAMILY MEDICINE

## 2023-05-09 PROCEDURE — 6360000002 HC RX W HCPCS: Performed by: FAMILY MEDICINE

## 2023-05-09 PROCEDURE — 82962 GLUCOSE BLOOD TEST: CPT

## 2023-05-09 PROCEDURE — 80048 BASIC METABOLIC PNL TOTAL CA: CPT

## 2023-05-09 PROCEDURE — 6370000000 HC RX 637 (ALT 250 FOR IP): Performed by: STUDENT IN AN ORGANIZED HEALTH CARE EDUCATION/TRAINING PROGRAM

## 2023-05-09 PROCEDURE — 93308 TTE F-UP OR LMTD: CPT

## 2023-05-09 PROCEDURE — 2580000003 HC RX 258: Performed by: FAMILY MEDICINE

## 2023-05-09 PROCEDURE — 85025 COMPLETE CBC W/AUTO DIFF WBC: CPT

## 2023-05-09 RX ORDER — POLYETHYLENE GLYCOL 3350 17 G/17G
17 POWDER, FOR SOLUTION ORAL DAILY PRN
Status: DISCONTINUED | OUTPATIENT
Start: 2023-05-09 | End: 2023-05-17 | Stop reason: HOSPADM

## 2023-05-09 RX ORDER — SODIUM CHLORIDE 9 MG/ML
INJECTION, SOLUTION INTRAVENOUS PRN
Status: DISCONTINUED | OUTPATIENT
Start: 2023-05-09 | End: 2023-05-17 | Stop reason: HOSPADM

## 2023-05-09 RX ORDER — LOSARTAN POTASSIUM 50 MG/1
100 TABLET ORAL DAILY
Status: DISCONTINUED | OUTPATIENT
Start: 2023-05-09 | End: 2023-05-17 | Stop reason: HOSPADM

## 2023-05-09 RX ORDER — ISOSORBIDE MONONITRATE 60 MG/1
60 TABLET, EXTENDED RELEASE ORAL DAILY
Status: DISCONTINUED | OUTPATIENT
Start: 2023-05-09 | End: 2023-05-17 | Stop reason: HOSPADM

## 2023-05-09 RX ORDER — ONDANSETRON 4 MG/1
4 TABLET, ORALLY DISINTEGRATING ORAL EVERY 8 HOURS PRN
Status: DISCONTINUED | OUTPATIENT
Start: 2023-05-09 | End: 2023-05-17 | Stop reason: HOSPADM

## 2023-05-09 RX ORDER — FAMOTIDINE 20 MG/1
20 TABLET, FILM COATED ORAL DAILY
Status: DISCONTINUED | OUTPATIENT
Start: 2023-05-09 | End: 2023-05-17 | Stop reason: HOSPADM

## 2023-05-09 RX ORDER — SPIRONOLACTONE 100 MG/1
100 TABLET, FILM COATED ORAL DAILY
Status: DISCONTINUED | OUTPATIENT
Start: 2023-05-09 | End: 2023-05-17 | Stop reason: HOSPADM

## 2023-05-09 RX ORDER — ONDANSETRON 2 MG/ML
4 INJECTION INTRAMUSCULAR; INTRAVENOUS EVERY 6 HOURS PRN
Status: DISCONTINUED | OUTPATIENT
Start: 2023-05-09 | End: 2023-05-17 | Stop reason: HOSPADM

## 2023-05-09 RX ORDER — SODIUM CHLORIDE 0.9 % (FLUSH) 0.9 %
5-40 SYRINGE (ML) INJECTION EVERY 12 HOURS SCHEDULED
Status: DISCONTINUED | OUTPATIENT
Start: 2023-05-09 | End: 2023-05-17 | Stop reason: HOSPADM

## 2023-05-09 RX ORDER — HYDRALAZINE HYDROCHLORIDE 20 MG/ML
10 INJECTION INTRAMUSCULAR; INTRAVENOUS EVERY 6 HOURS PRN
Status: DISCONTINUED | OUTPATIENT
Start: 2023-05-09 | End: 2023-05-17 | Stop reason: HOSPADM

## 2023-05-09 RX ORDER — SODIUM CHLORIDE 0.9 % (FLUSH) 0.9 %
5-40 SYRINGE (ML) INJECTION PRN
Status: DISCONTINUED | OUTPATIENT
Start: 2023-05-09 | End: 2023-05-17 | Stop reason: HOSPADM

## 2023-05-09 RX ORDER — ACETAMINOPHEN 325 MG/1
650 TABLET ORAL EVERY 6 HOURS PRN
Status: DISCONTINUED | OUTPATIENT
Start: 2023-05-09 | End: 2023-05-17 | Stop reason: HOSPADM

## 2023-05-09 RX ORDER — AMLODIPINE BESYLATE 5 MG/1
10 TABLET ORAL DAILY
Status: DISCONTINUED | OUTPATIENT
Start: 2023-05-09 | End: 2023-05-17 | Stop reason: HOSPADM

## 2023-05-09 RX ORDER — ACETAMINOPHEN 650 MG/1
650 SUPPOSITORY RECTAL EVERY 6 HOURS PRN
Status: DISCONTINUED | OUTPATIENT
Start: 2023-05-09 | End: 2023-05-17 | Stop reason: HOSPADM

## 2023-05-09 RX ORDER — HEPARIN SODIUM 5000 [USP'U]/ML
5000 INJECTION, SOLUTION INTRAVENOUS; SUBCUTANEOUS EVERY 8 HOURS SCHEDULED
Status: DISCONTINUED | OUTPATIENT
Start: 2023-05-09 | End: 2023-05-17 | Stop reason: HOSPADM

## 2023-05-09 RX ORDER — DOXAZOSIN MESYLATE 4 MG/1
4 TABLET ORAL DAILY
Status: DISCONTINUED | OUTPATIENT
Start: 2023-05-10 | End: 2023-05-17 | Stop reason: HOSPADM

## 2023-05-09 RX ADMIN — SODIUM CHLORIDE, PRESERVATIVE FREE 10 ML: 5 INJECTION INTRAVENOUS at 21:23

## 2023-05-09 RX ADMIN — LOSARTAN POTASSIUM 100 MG: 50 TABLET, FILM COATED ORAL at 01:18

## 2023-05-09 RX ADMIN — SEVELAMER CARBONATE 1600 MG: 800 TABLET, FILM COATED ORAL at 17:56

## 2023-05-09 RX ADMIN — FAMOTIDINE 20 MG: 20 TABLET, FILM COATED ORAL at 09:43

## 2023-05-09 RX ADMIN — ISOSORBIDE MONONITRATE 60 MG: 60 TABLET, EXTENDED RELEASE ORAL at 01:18

## 2023-05-09 RX ADMIN — LABETALOL HYDROCHLORIDE 200 MG: 200 TABLET, FILM COATED ORAL at 09:43

## 2023-05-09 RX ADMIN — HYDRALAZINE HYDROCHLORIDE 10 MG: 20 INJECTION INTRAMUSCULAR; INTRAVENOUS at 05:21

## 2023-05-09 RX ADMIN — SPIRONOLACTONE 100 MG: 100 TABLET ORAL at 01:18

## 2023-05-09 RX ADMIN — HEPARIN SODIUM 5000 UNITS: 5000 INJECTION INTRAVENOUS; SUBCUTANEOUS at 13:05

## 2023-05-09 RX ADMIN — SODIUM CHLORIDE, PRESERVATIVE FREE 10 ML: 5 INJECTION INTRAVENOUS at 09:43

## 2023-05-09 RX ADMIN — SEVELAMER CARBONATE 1600 MG: 800 TABLET, FILM COATED ORAL at 11:53

## 2023-05-09 RX ADMIN — ASPIRIN 81 MG: 81 TABLET, COATED ORAL at 09:43

## 2023-05-09 RX ADMIN — HEPARIN SODIUM 5000 UNITS: 5000 INJECTION INTRAVENOUS; SUBCUTANEOUS at 09:42

## 2023-05-09 RX ADMIN — AMLODIPINE BESYLATE 10 MG: 5 TABLET ORAL at 01:18

## 2023-05-09 RX ADMIN — LABETALOL HYDROCHLORIDE 200 MG: 200 TABLET, FILM COATED ORAL at 21:19

## 2023-05-09 RX ADMIN — HEPARIN SODIUM 5000 UNITS: 5000 INJECTION INTRAVENOUS; SUBCUTANEOUS at 21:20

## 2023-05-09 RX ADMIN — CALCITRIOL CAPSULES 0.25 MCG 0.5 MCG: 0.25 CAPSULE ORAL at 09:43

## 2023-05-09 RX ADMIN — DOXAZOSIN 2 MG: 1 TABLET ORAL at 09:42

## 2023-05-09 ASSESSMENT — PAIN DESCRIPTION - PAIN TYPE: TYPE: ACUTE PAIN

## 2023-05-09 ASSESSMENT — PAIN DESCRIPTION - LOCATION: LOCATION: HEAD

## 2023-05-09 ASSESSMENT — PAIN SCALES - GENERAL
PAINLEVEL_OUTOF10: 0
PAINLEVEL_OUTOF10: 0
PAINLEVEL_OUTOF10: 5
PAINLEVEL_OUTOF10: 0

## 2023-05-10 LAB
ANION GAP SERPL CALC-SCNC: 9 MMOL/L (ref 3–18)
BASOPHILS # BLD: 0 K/UL (ref 0–0.1)
BASOPHILS NFR BLD: 0 % (ref 0–2)
BUN SERPL-MCNC: 67 MG/DL (ref 7–18)
BUN/CREAT SERPL: 8 (ref 12–20)
CALCIUM SERPL-MCNC: 7.7 MG/DL (ref 8.5–10.1)
CHLORIDE SERPL-SCNC: 105 MMOL/L (ref 100–111)
CO2 SERPL-SCNC: 23 MMOL/L (ref 21–32)
CREAT SERPL-MCNC: 8.75 MG/DL (ref 0.6–1.3)
DIFFERENTIAL METHOD BLD: ABNORMAL
EOSINOPHIL # BLD: 0.2 K/UL (ref 0–0.4)
EOSINOPHIL NFR BLD: 5 % (ref 0–5)
ERYTHROCYTE [DISTWIDTH] IN BLOOD BY AUTOMATED COUNT: 18.6 % (ref 11.6–14.5)
GLUCOSE BLD STRIP.AUTO-MCNC: 105 MG/DL (ref 70–110)
GLUCOSE BLD STRIP.AUTO-MCNC: 109 MG/DL (ref 70–110)
GLUCOSE BLD STRIP.AUTO-MCNC: 91 MG/DL (ref 70–110)
GLUCOSE BLD STRIP.AUTO-MCNC: 92 MG/DL (ref 70–110)
GLUCOSE SERPL-MCNC: 77 MG/DL (ref 74–99)
HCT VFR BLD AUTO: 19.5 % (ref 36–48)
HGB BLD-MCNC: 6.5 G/DL (ref 13–16)
HISTORY CHECK: NORMAL
IMM GRANULOCYTES # BLD AUTO: 0 K/UL (ref 0–0.04)
IMM GRANULOCYTES NFR BLD AUTO: 0 % (ref 0–0.5)
LYMPHOCYTES # BLD: 0.6 K/UL (ref 0.9–3.6)
LYMPHOCYTES NFR BLD: 13 % (ref 21–52)
MCH RBC QN AUTO: 26.9 PG (ref 24–34)
MCHC RBC AUTO-ENTMCNC: 33.3 G/DL (ref 31–37)
MCV RBC AUTO: 80.6 FL (ref 78–100)
MONOCYTES # BLD: 0.5 K/UL (ref 0.05–1.2)
MONOCYTES NFR BLD: 10 % (ref 3–10)
NEUTS SEG # BLD: 3.3 K/UL (ref 1.8–8)
NEUTS SEG NFR BLD: 72 % (ref 40–73)
NRBC # BLD: 0 K/UL (ref 0–0.01)
NRBC BLD-RTO: 0 PER 100 WBC
PLATELET # BLD AUTO: 110 K/UL (ref 135–420)
POTASSIUM SERPL-SCNC: 4.7 MMOL/L (ref 3.5–5.5)
RBC # BLD AUTO: 2.42 M/UL (ref 4.35–5.65)
SODIUM SERPL-SCNC: 137 MMOL/L (ref 136–145)
WBC # BLD AUTO: 4.6 K/UL (ref 4.6–13.2)

## 2023-05-10 PROCEDURE — 86850 RBC ANTIBODY SCREEN: CPT

## 2023-05-10 PROCEDURE — 86923 COMPATIBILITY TEST ELECTRIC: CPT

## 2023-05-10 PROCEDURE — 6360000002 HC RX W HCPCS: Performed by: STUDENT IN AN ORGANIZED HEALTH CARE EDUCATION/TRAINING PROGRAM

## 2023-05-10 PROCEDURE — 6370000000 HC RX 637 (ALT 250 FOR IP): Performed by: STUDENT IN AN ORGANIZED HEALTH CARE EDUCATION/TRAINING PROGRAM

## 2023-05-10 PROCEDURE — 86901 BLOOD TYPING SEROLOGIC RH(D): CPT

## 2023-05-10 PROCEDURE — 6360000002 HC RX W HCPCS: Performed by: FAMILY MEDICINE

## 2023-05-10 PROCEDURE — 6370000000 HC RX 637 (ALT 250 FOR IP): Performed by: FAMILY MEDICINE

## 2023-05-10 PROCEDURE — 80048 BASIC METABOLIC PNL TOTAL CA: CPT

## 2023-05-10 PROCEDURE — P9016 RBC LEUKOCYTES REDUCED: HCPCS

## 2023-05-10 PROCEDURE — 86900 BLOOD TYPING SEROLOGIC ABO: CPT

## 2023-05-10 PROCEDURE — 90935 HEMODIALYSIS ONE EVALUATION: CPT

## 2023-05-10 PROCEDURE — 36415 COLL VENOUS BLD VENIPUNCTURE: CPT

## 2023-05-10 PROCEDURE — 97602 WOUND(S) CARE NON-SELECTIVE: CPT

## 2023-05-10 PROCEDURE — 1100000003 HC PRIVATE W/ TELEMETRY

## 2023-05-10 PROCEDURE — 85025 COMPLETE CBC W/AUTO DIFF WBC: CPT

## 2023-05-10 PROCEDURE — 36430 TRANSFUSION BLD/BLD COMPNT: CPT

## 2023-05-10 PROCEDURE — 2580000003 HC RX 258: Performed by: FAMILY MEDICINE

## 2023-05-10 PROCEDURE — 2700000000 HC OXYGEN THERAPY PER DAY

## 2023-05-10 PROCEDURE — 82962 GLUCOSE BLOOD TEST: CPT

## 2023-05-10 RX ORDER — HEPARIN SODIUM 1000 [USP'U]/ML
4600 INJECTION, SOLUTION INTRAVENOUS; SUBCUTANEOUS
Status: DISCONTINUED | OUTPATIENT
Start: 2023-05-10 | End: 2023-05-17 | Stop reason: HOSPADM

## 2023-05-10 RX ORDER — SODIUM CHLORIDE 9 MG/ML
INJECTION, SOLUTION INTRAVENOUS PRN
Status: DISCONTINUED | OUTPATIENT
Start: 2023-05-10 | End: 2023-05-17 | Stop reason: HOSPADM

## 2023-05-10 RX ORDER — HYDRALAZINE HYDROCHLORIDE 50 MG/1
50 TABLET, FILM COATED ORAL EVERY 8 HOURS SCHEDULED
Status: DISCONTINUED | OUTPATIENT
Start: 2023-05-10 | End: 2023-05-11

## 2023-05-10 RX ADMIN — HEPARIN SODIUM 5000 UNITS: 5000 INJECTION INTRAVENOUS; SUBCUTANEOUS at 06:18

## 2023-05-10 RX ADMIN — EPOETIN ALFA-EPBX 10000 UNITS: 10000 INJECTION, SOLUTION INTRAVENOUS; SUBCUTANEOUS at 22:40

## 2023-05-10 RX ADMIN — HEPARIN SODIUM 5000 UNITS: 5000 INJECTION INTRAVENOUS; SUBCUTANEOUS at 22:40

## 2023-05-10 RX ADMIN — HEPARIN SODIUM 4600 UNITS: 1000 INJECTION INTRAVENOUS; SUBCUTANEOUS at 21:57

## 2023-05-10 RX ADMIN — LABETALOL HYDROCHLORIDE 200 MG: 200 TABLET, FILM COATED ORAL at 09:25

## 2023-05-10 RX ADMIN — ASPIRIN 81 MG: 81 TABLET, COATED ORAL at 09:21

## 2023-05-10 RX ADMIN — HYDRALAZINE HYDROCHLORIDE 50 MG: 50 TABLET, FILM COATED ORAL at 10:27

## 2023-05-10 RX ADMIN — FAMOTIDINE 20 MG: 20 TABLET, FILM COATED ORAL at 09:25

## 2023-05-10 RX ADMIN — SPIRONOLACTONE 100 MG: 100 TABLET ORAL at 09:24

## 2023-05-10 RX ADMIN — LOSARTAN POTASSIUM 100 MG: 50 TABLET, FILM COATED ORAL at 09:22

## 2023-05-10 RX ADMIN — SEVELAMER CARBONATE 1600 MG: 800 TABLET, FILM COATED ORAL at 11:48

## 2023-05-10 RX ADMIN — DOXAZOSIN 4 MG: 4 TABLET ORAL at 09:24

## 2023-05-10 RX ADMIN — LABETALOL HYDROCHLORIDE 200 MG: 200 TABLET, FILM COATED ORAL at 21:12

## 2023-05-10 RX ADMIN — ISOSORBIDE MONONITRATE 60 MG: 60 TABLET, EXTENDED RELEASE ORAL at 09:23

## 2023-05-10 RX ADMIN — HEPARIN SODIUM 5000 UNITS: 5000 INJECTION INTRAVENOUS; SUBCUTANEOUS at 14:01

## 2023-05-10 RX ADMIN — SODIUM CHLORIDE, PRESERVATIVE FREE 10 ML: 5 INJECTION INTRAVENOUS at 21:10

## 2023-05-10 RX ADMIN — AMLODIPINE BESYLATE 10 MG: 5 TABLET ORAL at 09:23

## 2023-05-10 RX ADMIN — SODIUM CHLORIDE, PRESERVATIVE FREE 10 ML: 5 INJECTION INTRAVENOUS at 09:26

## 2023-05-10 RX ADMIN — HYDRALAZINE HYDROCHLORIDE 50 MG: 50 TABLET, FILM COATED ORAL at 21:12

## 2023-05-10 RX ADMIN — CALCITRIOL CAPSULES 0.25 MCG 0.5 MCG: 0.25 CAPSULE ORAL at 09:25

## 2023-05-10 ASSESSMENT — PAIN SCALES - GENERAL
PAINLEVEL_OUTOF10: 0
PAINLEVEL_OUTOF10: 0

## 2023-05-11 ENCOUNTER — APPOINTMENT (OUTPATIENT)
Facility: HOSPITAL | Age: 53
DRG: 291 | End: 2023-05-11
Payer: MEDICARE

## 2023-05-11 ENCOUNTER — HOME HEALTH ADMISSION (OUTPATIENT)
Age: 53
End: 2023-05-11

## 2023-05-11 LAB
ABO + RH BLD: NORMAL
ANION GAP SERPL CALC-SCNC: 6 MMOL/L (ref 3–18)
BASOPHILS # BLD: 0 K/UL (ref 0–0.1)
BASOPHILS NFR BLD: 0 % (ref 0–2)
BLD PROD TYP BPU: NORMAL
BLOOD BANK DISPENSE STATUS: NORMAL
BLOOD GROUP ANTIBODIES SERPL: NORMAL
BPU ID: NORMAL
BUN SERPL-MCNC: 26 MG/DL (ref 7–18)
BUN/CREAT SERPL: 5 (ref 12–20)
CALCIUM SERPL-MCNC: 8.7 MG/DL (ref 8.5–10.1)
CALLED TO: NORMAL
CHLORIDE SERPL-SCNC: 105 MMOL/L (ref 100–111)
CO2 SERPL-SCNC: 26 MMOL/L (ref 21–32)
CREAT SERPL-MCNC: 4.78 MG/DL (ref 0.6–1.3)
CROSSMATCH RESULT: NORMAL
D DIMER PPP FEU-MCNC: 1.3 UG/ML(FEU)
DIFFERENTIAL METHOD BLD: ABNORMAL
EOSINOPHIL # BLD: 0.2 K/UL (ref 0–0.4)
EOSINOPHIL NFR BLD: 4 % (ref 0–5)
ERYTHROCYTE [DISTWIDTH] IN BLOOD BY AUTOMATED COUNT: 18.1 % (ref 11.6–14.5)
EST. AVERAGE GLUCOSE BLD GHB EST-MCNC: ABNORMAL MG/DL
FERRITIN SERPL-MCNC: 1378 NG/ML (ref 8–388)
GLUCOSE BLD STRIP.AUTO-MCNC: 104 MG/DL (ref 70–110)
GLUCOSE BLD STRIP.AUTO-MCNC: 109 MG/DL (ref 70–110)
GLUCOSE BLD STRIP.AUTO-MCNC: 113 MG/DL (ref 70–110)
GLUCOSE BLD STRIP.AUTO-MCNC: 90 MG/DL (ref 70–110)
GLUCOSE SERPL-MCNC: 97 MG/DL (ref 74–99)
HBA1C MFR BLD: <3.8 % (ref 4.2–5.6)
HCT VFR BLD AUTO: 22.9 % (ref 36–48)
HGB BLD-MCNC: 7.7 G/DL (ref 13–16)
IMM GRANULOCYTES # BLD AUTO: 0 K/UL (ref 0–0.04)
IMM GRANULOCYTES NFR BLD AUTO: 0 % (ref 0–0.5)
IRON SATN MFR SERPL: 35 % (ref 20–50)
IRON SERPL-MCNC: 54 UG/DL (ref 50–175)
LYMPHOCYTES # BLD: 0.4 K/UL (ref 0.9–3.6)
LYMPHOCYTES NFR BLD: 8 % (ref 21–52)
MCH RBC QN AUTO: 27 PG (ref 24–34)
MCHC RBC AUTO-ENTMCNC: 33.6 G/DL (ref 31–37)
MCV RBC AUTO: 80.4 FL (ref 78–100)
MONOCYTES # BLD: 0.5 K/UL (ref 0.05–1.2)
MONOCYTES NFR BLD: 9 % (ref 3–10)
NEUTS SEG # BLD: 4.6 K/UL (ref 1.8–8)
NEUTS SEG NFR BLD: 79 % (ref 40–73)
NRBC # BLD: 0 K/UL (ref 0–0.01)
NRBC BLD-RTO: 0 PER 100 WBC
PLATELET # BLD AUTO: 144 K/UL (ref 135–420)
PMV BLD AUTO: 10.3 FL (ref 9.2–11.8)
POTASSIUM SERPL-SCNC: 3.8 MMOL/L (ref 3.5–5.5)
RBC # BLD AUTO: 2.85 M/UL (ref 4.35–5.65)
SODIUM SERPL-SCNC: 137 MMOL/L (ref 136–145)
SPECIMEN EXP DATE BLD: NORMAL
TIBC SERPL-MCNC: 155 UG/DL (ref 250–450)
UNIT DIVISION: 0
WBC # BLD AUTO: 5.8 K/UL (ref 4.6–13.2)

## 2023-05-11 PROCEDURE — 1100000003 HC PRIVATE W/ TELEMETRY

## 2023-05-11 PROCEDURE — 30233N1 TRANSFUSION OF NONAUTOLOGOUS RED BLOOD CELLS INTO PERIPHERAL VEIN, PERCUTANEOUS APPROACH: ICD-10-PCS | Performed by: FAMILY MEDICINE

## 2023-05-11 PROCEDURE — 83540 ASSAY OF IRON: CPT

## 2023-05-11 PROCEDURE — 6360000002 HC RX W HCPCS: Performed by: FAMILY MEDICINE

## 2023-05-11 PROCEDURE — 85379 FIBRIN DEGRADATION QUANT: CPT

## 2023-05-11 PROCEDURE — 83550 IRON BINDING TEST: CPT

## 2023-05-11 PROCEDURE — 6360000004 HC RX CONTRAST MEDICATION: Performed by: FAMILY MEDICINE

## 2023-05-11 PROCEDURE — 83036 HEMOGLOBIN GLYCOSYLATED A1C: CPT

## 2023-05-11 PROCEDURE — 85025 COMPLETE CBC W/AUTO DIFF WBC: CPT

## 2023-05-11 PROCEDURE — 71275 CT ANGIOGRAPHY CHEST: CPT

## 2023-05-11 PROCEDURE — 2700000000 HC OXYGEN THERAPY PER DAY

## 2023-05-11 PROCEDURE — 82728 ASSAY OF FERRITIN: CPT

## 2023-05-11 PROCEDURE — 6370000000 HC RX 637 (ALT 250 FOR IP): Performed by: FAMILY MEDICINE

## 2023-05-11 PROCEDURE — 80048 BASIC METABOLIC PNL TOTAL CA: CPT

## 2023-05-11 PROCEDURE — 6370000000 HC RX 637 (ALT 250 FOR IP): Performed by: STUDENT IN AN ORGANIZED HEALTH CARE EDUCATION/TRAINING PROGRAM

## 2023-05-11 PROCEDURE — 36415 COLL VENOUS BLD VENIPUNCTURE: CPT

## 2023-05-11 PROCEDURE — 2580000003 HC RX 258: Performed by: FAMILY MEDICINE

## 2023-05-11 PROCEDURE — 82962 GLUCOSE BLOOD TEST: CPT

## 2023-05-11 RX ORDER — HYDRALAZINE HYDROCHLORIDE 50 MG/1
100 TABLET, FILM COATED ORAL EVERY 8 HOURS SCHEDULED
Status: DISCONTINUED | OUTPATIENT
Start: 2023-05-11 | End: 2023-05-17 | Stop reason: HOSPADM

## 2023-05-11 RX ORDER — DEXTROSE MONOHYDRATE 100 MG/ML
INJECTION, SOLUTION INTRAVENOUS CONTINUOUS PRN
Status: DISCONTINUED | OUTPATIENT
Start: 2023-05-11 | End: 2023-05-17 | Stop reason: HOSPADM

## 2023-05-11 RX ADMIN — FAMOTIDINE 20 MG: 20 TABLET, FILM COATED ORAL at 09:24

## 2023-05-11 RX ADMIN — HEPARIN SODIUM 5000 UNITS: 5000 INJECTION INTRAVENOUS; SUBCUTANEOUS at 05:45

## 2023-05-11 RX ADMIN — HYDRALAZINE HYDROCHLORIDE 100 MG: 50 TABLET, FILM COATED ORAL at 21:20

## 2023-05-11 RX ADMIN — HEPARIN SODIUM 5000 UNITS: 5000 INJECTION INTRAVENOUS; SUBCUTANEOUS at 21:20

## 2023-05-11 RX ADMIN — HYDRALAZINE HYDROCHLORIDE 50 MG: 50 TABLET, FILM COATED ORAL at 05:47

## 2023-05-11 RX ADMIN — CALCITRIOL CAPSULES 0.25 MCG 0.5 MCG: 0.25 CAPSULE ORAL at 09:24

## 2023-05-11 RX ADMIN — SODIUM CHLORIDE, PRESERVATIVE FREE 10 ML: 5 INJECTION INTRAVENOUS at 09:28

## 2023-05-11 RX ADMIN — HYDRALAZINE HYDROCHLORIDE 10 MG: 20 INJECTION INTRAMUSCULAR; INTRAVENOUS at 03:08

## 2023-05-11 RX ADMIN — LOSARTAN POTASSIUM 100 MG: 50 TABLET, FILM COATED ORAL at 09:24

## 2023-05-11 RX ADMIN — IOPAMIDOL 100 ML: 755 INJECTION, SOLUTION INTRAVENOUS at 10:28

## 2023-05-11 RX ADMIN — ASPIRIN 81 MG: 81 TABLET, COATED ORAL at 09:24

## 2023-05-11 RX ADMIN — DOXAZOSIN 4 MG: 4 TABLET ORAL at 09:24

## 2023-05-11 RX ADMIN — AMLODIPINE BESYLATE 10 MG: 5 TABLET ORAL at 09:22

## 2023-05-11 RX ADMIN — ACETAMINOPHEN 650 MG: 325 TABLET ORAL at 03:15

## 2023-05-11 RX ADMIN — HYDRALAZINE HYDROCHLORIDE 100 MG: 50 TABLET, FILM COATED ORAL at 15:14

## 2023-05-11 RX ADMIN — ISOSORBIDE MONONITRATE 60 MG: 60 TABLET, EXTENDED RELEASE ORAL at 09:24

## 2023-05-11 RX ADMIN — LABETALOL HYDROCHLORIDE 200 MG: 200 TABLET, FILM COATED ORAL at 09:24

## 2023-05-11 RX ADMIN — SPIRONOLACTONE 100 MG: 100 TABLET ORAL at 09:24

## 2023-05-11 RX ADMIN — LABETALOL HYDROCHLORIDE 200 MG: 200 TABLET, FILM COATED ORAL at 21:20

## 2023-05-11 RX ADMIN — SODIUM CHLORIDE, PRESERVATIVE FREE 10 ML: 5 INJECTION INTRAVENOUS at 21:22

## 2023-05-11 RX ADMIN — HEPARIN SODIUM 5000 UNITS: 5000 INJECTION INTRAVENOUS; SUBCUTANEOUS at 15:16

## 2023-05-11 ASSESSMENT — PAIN DESCRIPTION - DESCRIPTORS: DESCRIPTORS: ACHING

## 2023-05-11 ASSESSMENT — PAIN - FUNCTIONAL ASSESSMENT: PAIN_FUNCTIONAL_ASSESSMENT: ACTIVITIES ARE NOT PREVENTED

## 2023-05-11 ASSESSMENT — PAIN DESCRIPTION - ORIENTATION: ORIENTATION: ANTERIOR

## 2023-05-11 ASSESSMENT — PAIN SCALES - GENERAL
PAINLEVEL_OUTOF10: 0
PAINLEVEL_OUTOF10: 10

## 2023-05-11 ASSESSMENT — PAIN DESCRIPTION - LOCATION: LOCATION: HEAD

## 2023-05-12 PROBLEM — K52.9 CHRONIC DIARRHEA: Status: RESOLVED | Noted: 2022-08-28 | Resolved: 2023-05-12

## 2023-05-12 PROBLEM — J96.01 ACUTE RESPIRATORY FAILURE WITH HYPOXEMIA (HCC): Status: RESOLVED | Noted: 2023-04-22 | Resolved: 2023-05-12

## 2023-05-12 PROBLEM — J96.01 ACUTE HYPOXEMIC RESPIRATORY FAILURE (HCC): Status: RESOLVED | Noted: 2023-04-22 | Resolved: 2023-05-12

## 2023-05-12 PROBLEM — R06.00 DYSPNEA: Status: ACTIVE | Noted: 2023-05-12

## 2023-05-12 LAB
ANION GAP SERPL CALC-SCNC: 6 MMOL/L (ref 3–18)
BUN SERPL-MCNC: 37 MG/DL (ref 7–18)
BUN/CREAT SERPL: 6 (ref 12–20)
CALCIUM SERPL-MCNC: 8.5 MG/DL (ref 8.5–10.1)
CHLORIDE SERPL-SCNC: 102 MMOL/L (ref 100–111)
CO2 SERPL-SCNC: 26 MMOL/L (ref 21–32)
CREAT SERPL-MCNC: 6.45 MG/DL (ref 0.6–1.3)
GLUCOSE BLD STRIP.AUTO-MCNC: 112 MG/DL (ref 70–110)
GLUCOSE BLD STRIP.AUTO-MCNC: 90 MG/DL (ref 70–110)
GLUCOSE SERPL-MCNC: 93 MG/DL (ref 74–99)
HBV SURFACE AB SER QL IA: NEGATIVE
HBV SURFACE AB SERPL IA-ACNC: 4.61 MIU/ML
HBV SURFACE AG SER QL: <0.1 INDEX
HBV SURFACE AG SER QL: NEGATIVE
Lab: ABNORMAL
POTASSIUM SERPL-SCNC: 4.3 MMOL/L (ref 3.5–5.5)
SODIUM SERPL-SCNC: 134 MMOL/L (ref 136–145)

## 2023-05-12 PROCEDURE — 80048 BASIC METABOLIC PNL TOTAL CA: CPT

## 2023-05-12 PROCEDURE — 6360000002 HC RX W HCPCS: Performed by: FAMILY MEDICINE

## 2023-05-12 PROCEDURE — 6370000000 HC RX 637 (ALT 250 FOR IP): Performed by: STUDENT IN AN ORGANIZED HEALTH CARE EDUCATION/TRAINING PROGRAM

## 2023-05-12 PROCEDURE — 6360000002 HC RX W HCPCS: Performed by: STUDENT IN AN ORGANIZED HEALTH CARE EDUCATION/TRAINING PROGRAM

## 2023-05-12 PROCEDURE — 2700000000 HC OXYGEN THERAPY PER DAY

## 2023-05-12 PROCEDURE — 6370000000 HC RX 637 (ALT 250 FOR IP): Performed by: FAMILY MEDICINE

## 2023-05-12 PROCEDURE — 36415 COLL VENOUS BLD VENIPUNCTURE: CPT

## 2023-05-12 PROCEDURE — 87340 HEPATITIS B SURFACE AG IA: CPT

## 2023-05-12 PROCEDURE — 86706 HEP B SURFACE ANTIBODY: CPT

## 2023-05-12 PROCEDURE — 2580000003 HC RX 258: Performed by: FAMILY MEDICINE

## 2023-05-12 PROCEDURE — 90935 HEMODIALYSIS ONE EVALUATION: CPT

## 2023-05-12 PROCEDURE — 82962 GLUCOSE BLOOD TEST: CPT

## 2023-05-12 PROCEDURE — 1100000003 HC PRIVATE W/ TELEMETRY

## 2023-05-12 RX ADMIN — ASPIRIN 81 MG: 81 TABLET, COATED ORAL at 09:13

## 2023-05-12 RX ADMIN — HYDRALAZINE HYDROCHLORIDE 100 MG: 50 TABLET, FILM COATED ORAL at 05:47

## 2023-05-12 RX ADMIN — FAMOTIDINE 20 MG: 20 TABLET, FILM COATED ORAL at 09:13

## 2023-05-12 RX ADMIN — SODIUM CHLORIDE, PRESERVATIVE FREE 10 ML: 5 INJECTION INTRAVENOUS at 21:35

## 2023-05-12 RX ADMIN — HEPARIN SODIUM 5000 UNITS: 5000 INJECTION INTRAVENOUS; SUBCUTANEOUS at 05:47

## 2023-05-12 RX ADMIN — SEVELAMER CARBONATE 1600 MG: 800 TABLET, FILM COATED ORAL at 11:45

## 2023-05-12 RX ADMIN — HEPARIN SODIUM 5000 UNITS: 5000 INJECTION INTRAVENOUS; SUBCUTANEOUS at 21:33

## 2023-05-12 RX ADMIN — HYDRALAZINE HYDROCHLORIDE 100 MG: 50 TABLET, FILM COATED ORAL at 21:33

## 2023-05-12 RX ADMIN — LABETALOL HYDROCHLORIDE 200 MG: 200 TABLET, FILM COATED ORAL at 21:34

## 2023-05-12 RX ADMIN — EPOETIN ALFA-EPBX 10000 UNITS: 10000 INJECTION, SOLUTION INTRAVENOUS; SUBCUTANEOUS at 21:40

## 2023-05-12 RX ADMIN — CALCITRIOL CAPSULES 0.25 MCG 0.5 MCG: 0.25 CAPSULE ORAL at 09:13

## 2023-05-12 RX ADMIN — SODIUM CHLORIDE, PRESERVATIVE FREE 10 ML: 5 INJECTION INTRAVENOUS at 09:14

## 2023-05-12 RX ADMIN — SPIRONOLACTONE 100 MG: 100 TABLET ORAL at 09:13

## 2023-05-13 ENCOUNTER — APPOINTMENT (OUTPATIENT)
Facility: HOSPITAL | Age: 53
DRG: 291 | End: 2023-05-13
Payer: MEDICARE

## 2023-05-13 LAB
GLUCOSE BLD STRIP.AUTO-MCNC: 113 MG/DL (ref 70–110)
GLUCOSE BLD STRIP.AUTO-MCNC: 116 MG/DL (ref 70–110)
GLUCOSE BLD STRIP.AUTO-MCNC: 155 MG/DL (ref 70–110)

## 2023-05-13 PROCEDURE — 82962 GLUCOSE BLOOD TEST: CPT

## 2023-05-13 PROCEDURE — 6360000002 HC RX W HCPCS: Performed by: FAMILY MEDICINE

## 2023-05-13 PROCEDURE — 99232 SBSQ HOSP IP/OBS MODERATE 35: CPT | Performed by: INTERNAL MEDICINE

## 2023-05-13 PROCEDURE — 6370000000 HC RX 637 (ALT 250 FOR IP): Performed by: FAMILY MEDICINE

## 2023-05-13 PROCEDURE — 6370000000 HC RX 637 (ALT 250 FOR IP): Performed by: STUDENT IN AN ORGANIZED HEALTH CARE EDUCATION/TRAINING PROGRAM

## 2023-05-13 PROCEDURE — 1100000003 HC PRIVATE W/ TELEMETRY

## 2023-05-13 PROCEDURE — 71250 CT THORAX DX C-: CPT

## 2023-05-13 PROCEDURE — 2580000003 HC RX 258: Performed by: FAMILY MEDICINE

## 2023-05-13 PROCEDURE — 2700000000 HC OXYGEN THERAPY PER DAY

## 2023-05-13 RX ADMIN — DOXAZOSIN 4 MG: 4 TABLET ORAL at 08:16

## 2023-05-13 RX ADMIN — LABETALOL HYDROCHLORIDE 200 MG: 200 TABLET, FILM COATED ORAL at 08:17

## 2023-05-13 RX ADMIN — HYDRALAZINE HYDROCHLORIDE 100 MG: 50 TABLET, FILM COATED ORAL at 20:56

## 2023-05-13 RX ADMIN — HYDRALAZINE HYDROCHLORIDE 10 MG: 20 INJECTION INTRAMUSCULAR; INTRAVENOUS at 00:32

## 2023-05-13 RX ADMIN — HEPARIN SODIUM 5000 UNITS: 5000 INJECTION INTRAVENOUS; SUBCUTANEOUS at 20:56

## 2023-05-13 RX ADMIN — HYDRALAZINE HYDROCHLORIDE 100 MG: 50 TABLET, FILM COATED ORAL at 14:29

## 2023-05-13 RX ADMIN — HEPARIN SODIUM 5000 UNITS: 5000 INJECTION INTRAVENOUS; SUBCUTANEOUS at 14:29

## 2023-05-13 RX ADMIN — SODIUM CHLORIDE, PRESERVATIVE FREE 10 ML: 5 INJECTION INTRAVENOUS at 20:56

## 2023-05-13 RX ADMIN — ASPIRIN 81 MG: 81 TABLET, COATED ORAL at 08:17

## 2023-05-13 RX ADMIN — ISOSORBIDE MONONITRATE 60 MG: 60 TABLET, EXTENDED RELEASE ORAL at 08:17

## 2023-05-13 RX ADMIN — HEPARIN SODIUM 5000 UNITS: 5000 INJECTION INTRAVENOUS; SUBCUTANEOUS at 05:15

## 2023-05-13 RX ADMIN — SPIRONOLACTONE 100 MG: 100 TABLET ORAL at 08:16

## 2023-05-13 RX ADMIN — LOSARTAN POTASSIUM 100 MG: 50 TABLET, FILM COATED ORAL at 08:17

## 2023-05-13 RX ADMIN — AMLODIPINE BESYLATE 10 MG: 5 TABLET ORAL at 08:17

## 2023-05-13 RX ADMIN — CALCITRIOL CAPSULES 0.25 MCG 0.5 MCG: 0.25 CAPSULE ORAL at 08:17

## 2023-05-13 RX ADMIN — FAMOTIDINE 20 MG: 20 TABLET, FILM COATED ORAL at 08:17

## 2023-05-13 RX ADMIN — HYDRALAZINE HYDROCHLORIDE 100 MG: 50 TABLET, FILM COATED ORAL at 05:15

## 2023-05-13 RX ADMIN — SODIUM CHLORIDE, PRESERVATIVE FREE 10 ML: 5 INJECTION INTRAVENOUS at 08:18

## 2023-05-13 RX ADMIN — SEVELAMER CARBONATE 1600 MG: 800 TABLET, FILM COATED ORAL at 17:18

## 2023-05-13 RX ADMIN — LABETALOL HYDROCHLORIDE 200 MG: 200 TABLET, FILM COATED ORAL at 20:55

## 2023-05-13 ASSESSMENT — ENCOUNTER SYMPTOMS
SHORTNESS OF BREATH: 0
ABDOMINAL DISTENTION: 0
NAUSEA: 0
WHEEZING: 0
COUGH: 0
VOMITING: 0
ABDOMINAL PAIN: 0
DIARRHEA: 0

## 2023-05-14 LAB
ALBUMIN SERPL-MCNC: 3 G/DL (ref 3.4–5)
ALBUMIN/GLOB SERPL: 0.6 (ref 0.8–1.7)
ALP SERPL-CCNC: 101 U/L (ref 45–117)
ALT SERPL-CCNC: 14 U/L (ref 16–61)
ANION GAP SERPL CALC-SCNC: 7 MMOL/L (ref 3–18)
AST SERPL-CCNC: 7 U/L (ref 10–38)
BACTERIA SPEC CULT: NORMAL
BACTERIA SPEC CULT: NORMAL
BASOPHILS # BLD: 0 K/UL (ref 0–0.1)
BASOPHILS NFR BLD: 0 % (ref 0–2)
BILIRUB SERPL-MCNC: 0.3 MG/DL (ref 0.2–1)
BUN SERPL-MCNC: 37 MG/DL (ref 7–18)
BUN/CREAT SERPL: 6 (ref 12–20)
CALCIUM SERPL-MCNC: 8.3 MG/DL (ref 8.5–10.1)
CHLORIDE SERPL-SCNC: 98 MMOL/L (ref 100–111)
CO2 SERPL-SCNC: 27 MMOL/L (ref 21–32)
CREAT SERPL-MCNC: 6.34 MG/DL (ref 0.6–1.3)
DIFFERENTIAL METHOD BLD: ABNORMAL
EOSINOPHIL # BLD: 0.3 K/UL (ref 0–0.4)
EOSINOPHIL NFR BLD: 5 % (ref 0–5)
ERYTHROCYTE [DISTWIDTH] IN BLOOD BY AUTOMATED COUNT: 18.7 % (ref 11.6–14.5)
GLOBULIN SER CALC-MCNC: 5.1 G/DL (ref 2–4)
GLUCOSE BLD STRIP.AUTO-MCNC: 125 MG/DL (ref 70–110)
GLUCOSE BLD STRIP.AUTO-MCNC: 126 MG/DL (ref 70–110)
GLUCOSE BLD STRIP.AUTO-MCNC: 168 MG/DL (ref 70–110)
GLUCOSE BLD STRIP.AUTO-MCNC: 97 MG/DL (ref 70–110)
GLUCOSE SERPL-MCNC: 83 MG/DL (ref 74–99)
HCT VFR BLD AUTO: 25.7 % (ref 36–48)
HGB BLD-MCNC: 8.4 G/DL (ref 13–16)
IMM GRANULOCYTES # BLD AUTO: 0 K/UL (ref 0–0.04)
IMM GRANULOCYTES NFR BLD AUTO: 0 % (ref 0–0.5)
LYMPHOCYTES # BLD: 0.8 K/UL (ref 0.9–3.6)
LYMPHOCYTES NFR BLD: 13 % (ref 21–52)
MCH RBC QN AUTO: 26.8 PG (ref 24–34)
MCHC RBC AUTO-ENTMCNC: 32.7 G/DL (ref 31–37)
MCV RBC AUTO: 81.8 FL (ref 78–100)
MONOCYTES # BLD: 0.6 K/UL (ref 0.05–1.2)
MONOCYTES NFR BLD: 10 % (ref 3–10)
NEUTS SEG # BLD: 4.2 K/UL (ref 1.8–8)
NEUTS SEG NFR BLD: 71 % (ref 40–73)
NRBC # BLD: 0 K/UL (ref 0–0.01)
NRBC BLD-RTO: 0 PER 100 WBC
PLATELET # BLD AUTO: 166 K/UL (ref 135–420)
PMV BLD AUTO: 9.8 FL (ref 9.2–11.8)
POTASSIUM SERPL-SCNC: 4.1 MMOL/L (ref 3.5–5.5)
PROT SERPL-MCNC: 8.1 G/DL (ref 6.4–8.2)
RBC # BLD AUTO: 3.14 M/UL (ref 4.35–5.65)
SERVICE CMNT-IMP: NORMAL
SERVICE CMNT-IMP: NORMAL
SODIUM SERPL-SCNC: 132 MMOL/L (ref 136–145)
WBC # BLD AUTO: 6 K/UL (ref 4.6–13.2)

## 2023-05-14 PROCEDURE — 6370000000 HC RX 637 (ALT 250 FOR IP): Performed by: STUDENT IN AN ORGANIZED HEALTH CARE EDUCATION/TRAINING PROGRAM

## 2023-05-14 PROCEDURE — 6370000000 HC RX 637 (ALT 250 FOR IP): Performed by: FAMILY MEDICINE

## 2023-05-14 PROCEDURE — 82962 GLUCOSE BLOOD TEST: CPT

## 2023-05-14 PROCEDURE — 36415 COLL VENOUS BLD VENIPUNCTURE: CPT

## 2023-05-14 PROCEDURE — 6360000002 HC RX W HCPCS: Performed by: FAMILY MEDICINE

## 2023-05-14 PROCEDURE — 2700000000 HC OXYGEN THERAPY PER DAY

## 2023-05-14 PROCEDURE — 1100000003 HC PRIVATE W/ TELEMETRY

## 2023-05-14 PROCEDURE — 85025 COMPLETE CBC W/AUTO DIFF WBC: CPT

## 2023-05-14 PROCEDURE — 2580000003 HC RX 258: Performed by: FAMILY MEDICINE

## 2023-05-14 PROCEDURE — 80053 COMPREHEN METABOLIC PANEL: CPT

## 2023-05-14 PROCEDURE — 99222 1ST HOSP IP/OBS MODERATE 55: CPT | Performed by: INTERNAL MEDICINE

## 2023-05-14 RX ADMIN — HEPARIN SODIUM 5000 UNITS: 5000 INJECTION INTRAVENOUS; SUBCUTANEOUS at 14:37

## 2023-05-14 RX ADMIN — SODIUM CHLORIDE, PRESERVATIVE FREE 10 ML: 5 INJECTION INTRAVENOUS at 09:06

## 2023-05-14 RX ADMIN — SODIUM CHLORIDE, PRESERVATIVE FREE 10 ML: 5 INJECTION INTRAVENOUS at 20:23

## 2023-05-14 RX ADMIN — SPIRONOLACTONE 100 MG: 100 TABLET ORAL at 09:05

## 2023-05-14 RX ADMIN — AMLODIPINE BESYLATE 10 MG: 5 TABLET ORAL at 09:05

## 2023-05-14 RX ADMIN — ASPIRIN 81 MG: 81 TABLET, COATED ORAL at 09:05

## 2023-05-14 RX ADMIN — FAMOTIDINE 20 MG: 20 TABLET, FILM COATED ORAL at 09:06

## 2023-05-14 RX ADMIN — LABETALOL HYDROCHLORIDE 200 MG: 200 TABLET, FILM COATED ORAL at 20:19

## 2023-05-14 RX ADMIN — HEPARIN SODIUM 5000 UNITS: 5000 INJECTION INTRAVENOUS; SUBCUTANEOUS at 05:15

## 2023-05-14 RX ADMIN — CALCITRIOL CAPSULES 0.25 MCG 0.5 MCG: 0.25 CAPSULE ORAL at 09:05

## 2023-05-14 RX ADMIN — SEVELAMER CARBONATE 1600 MG: 800 TABLET, FILM COATED ORAL at 16:36

## 2023-05-14 RX ADMIN — SEVELAMER CARBONATE 1600 MG: 800 TABLET, FILM COATED ORAL at 12:00

## 2023-05-14 RX ADMIN — HEPARIN SODIUM 5000 UNITS: 5000 INJECTION INTRAVENOUS; SUBCUTANEOUS at 22:46

## 2023-05-14 RX ADMIN — ISOSORBIDE MONONITRATE 60 MG: 60 TABLET, EXTENDED RELEASE ORAL at 09:05

## 2023-05-14 RX ADMIN — LOSARTAN POTASSIUM 100 MG: 50 TABLET, FILM COATED ORAL at 09:05

## 2023-05-14 RX ADMIN — LABETALOL HYDROCHLORIDE 200 MG: 200 TABLET, FILM COATED ORAL at 09:05

## 2023-05-14 RX ADMIN — SEVELAMER CARBONATE 1600 MG: 800 TABLET, FILM COATED ORAL at 09:05

## 2023-05-14 RX ADMIN — HYDRALAZINE HYDROCHLORIDE 100 MG: 50 TABLET, FILM COATED ORAL at 22:46

## 2023-05-14 RX ADMIN — HYDRALAZINE HYDROCHLORIDE 100 MG: 50 TABLET, FILM COATED ORAL at 14:37

## 2023-05-14 RX ADMIN — DOXAZOSIN 4 MG: 4 TABLET ORAL at 09:06

## 2023-05-14 RX ADMIN — HYDRALAZINE HYDROCHLORIDE 100 MG: 50 TABLET, FILM COATED ORAL at 05:16

## 2023-05-14 ASSESSMENT — PAIN SCALES - GENERAL: PAINLEVEL_OUTOF10: 0

## 2023-05-14 ASSESSMENT — ENCOUNTER SYMPTOMS
ABDOMINAL PAIN: 0
VOMITING: 0
COUGH: 0
SHORTNESS OF BREATH: 0
WHEEZING: 0
ABDOMINAL DISTENTION: 0
DIARRHEA: 0
NAUSEA: 0

## 2023-05-15 ENCOUNTER — APPOINTMENT (OUTPATIENT)
Facility: HOSPITAL | Age: 53
DRG: 291 | End: 2023-05-15
Payer: MEDICARE

## 2023-05-15 LAB
ECHO BSA: 1.99 M2
GLUCOSE BLD STRIP.AUTO-MCNC: 106 MG/DL (ref 70–110)
GLUCOSE BLD STRIP.AUTO-MCNC: 116 MG/DL (ref 70–110)
GLUCOSE BLD STRIP.AUTO-MCNC: 160 MG/DL (ref 70–110)
GLUCOSE BLD STRIP.AUTO-MCNC: 163 MG/DL (ref 70–110)
NUC STRESS EJECTION FRACTION: 45 %
STRESS TARGET HR: 170 BPM
TID: 1

## 2023-05-15 PROCEDURE — 6370000000 HC RX 637 (ALT 250 FOR IP): Performed by: STUDENT IN AN ORGANIZED HEALTH CARE EDUCATION/TRAINING PROGRAM

## 2023-05-15 PROCEDURE — A9500 TC99M SESTAMIBI: HCPCS | Performed by: INTERNAL MEDICINE

## 2023-05-15 PROCEDURE — 1100000003 HC PRIVATE W/ TELEMETRY

## 2023-05-15 PROCEDURE — 6370000000 HC RX 637 (ALT 250 FOR IP): Performed by: FAMILY MEDICINE

## 2023-05-15 PROCEDURE — 2700000000 HC OXYGEN THERAPY PER DAY

## 2023-05-15 PROCEDURE — 93017 CV STRESS TEST TRACING ONLY: CPT

## 2023-05-15 PROCEDURE — 82962 GLUCOSE BLOOD TEST: CPT

## 2023-05-15 PROCEDURE — 97530 THERAPEUTIC ACTIVITIES: CPT

## 2023-05-15 PROCEDURE — 2580000003 HC RX 258: Performed by: FAMILY MEDICINE

## 2023-05-15 PROCEDURE — 6360000002 HC RX W HCPCS: Performed by: INTERNAL MEDICINE

## 2023-05-15 PROCEDURE — 6360000002 HC RX W HCPCS: Performed by: FAMILY MEDICINE

## 2023-05-15 PROCEDURE — 97162 PT EVAL MOD COMPLEX 30 MIN: CPT

## 2023-05-15 PROCEDURE — 3430000000 HC RX DIAGNOSTIC RADIOPHARMACEUTICAL: Performed by: INTERNAL MEDICINE

## 2023-05-15 RX ORDER — TETRAKIS(2-METHOXYISOBUTYLISOCYANIDE)COPPER(I) TETRAFLUOROBORATE 1 MG/ML
33.5 INJECTION, POWDER, LYOPHILIZED, FOR SOLUTION INTRAVENOUS
Status: COMPLETED | OUTPATIENT
Start: 2023-05-15 | End: 2023-05-15

## 2023-05-15 RX ORDER — TETRAKIS(2-METHOXYISOBUTYLISOCYANIDE)COPPER(I) TETRAFLUOROBORATE 1 MG/ML
12 INJECTION, POWDER, LYOPHILIZED, FOR SOLUTION INTRAVENOUS
Status: COMPLETED | OUTPATIENT
Start: 2023-05-15 | End: 2023-05-15

## 2023-05-15 RX ADMIN — ASPIRIN 81 MG: 81 TABLET, COATED ORAL at 08:14

## 2023-05-15 RX ADMIN — SODIUM CHLORIDE, PRESERVATIVE FREE 10 ML: 5 INJECTION INTRAVENOUS at 21:18

## 2023-05-15 RX ADMIN — LABETALOL HYDROCHLORIDE 200 MG: 200 TABLET, FILM COATED ORAL at 08:14

## 2023-05-15 RX ADMIN — SODIUM CHLORIDE, PRESERVATIVE FREE 10 ML: 5 INJECTION INTRAVENOUS at 08:16

## 2023-05-15 RX ADMIN — ISOSORBIDE MONONITRATE 60 MG: 60 TABLET, EXTENDED RELEASE ORAL at 08:14

## 2023-05-15 RX ADMIN — HYDRALAZINE HYDROCHLORIDE 100 MG: 50 TABLET, FILM COATED ORAL at 21:18

## 2023-05-15 RX ADMIN — SEVELAMER CARBONATE 1600 MG: 800 TABLET, FILM COATED ORAL at 16:35

## 2023-05-15 RX ADMIN — TETRAKIS(2-METHOXYISOBUTYLISOCYANIDE)COPPER(I) TETRAFLUOROBORATE 33.5 MILLICURIE: 1 INJECTION, POWDER, LYOPHILIZED, FOR SOLUTION INTRAVENOUS at 11:58

## 2023-05-15 RX ADMIN — LABETALOL HYDROCHLORIDE 200 MG: 200 TABLET, FILM COATED ORAL at 21:18

## 2023-05-15 RX ADMIN — HEPARIN SODIUM 5000 UNITS: 5000 INJECTION INTRAVENOUS; SUBCUTANEOUS at 21:21

## 2023-05-15 RX ADMIN — DOXAZOSIN 4 MG: 4 TABLET ORAL at 08:13

## 2023-05-15 RX ADMIN — SPIRONOLACTONE 100 MG: 100 TABLET ORAL at 08:13

## 2023-05-15 RX ADMIN — REGADENOSON 0.4 MG: 0.08 INJECTION, SOLUTION INTRAVENOUS at 11:58

## 2023-05-15 RX ADMIN — TETRAKIS(2-METHOXYISOBUTYLISOCYANIDE)COPPER(I) TETRAFLUOROBORATE 12 MILLICURIE: 1 INJECTION, POWDER, LYOPHILIZED, FOR SOLUTION INTRAVENOUS at 08:40

## 2023-05-15 RX ADMIN — ACETAMINOPHEN 650 MG: 325 TABLET ORAL at 15:31

## 2023-05-15 RX ADMIN — CALCITRIOL CAPSULES 0.25 MCG 0.5 MCG: 0.25 CAPSULE ORAL at 08:14

## 2023-05-15 RX ADMIN — LOSARTAN POTASSIUM 100 MG: 50 TABLET, FILM COATED ORAL at 08:14

## 2023-05-15 RX ADMIN — AMLODIPINE BESYLATE 10 MG: 5 TABLET ORAL at 08:13

## 2023-05-15 RX ADMIN — HEPARIN SODIUM 5000 UNITS: 5000 INJECTION INTRAVENOUS; SUBCUTANEOUS at 06:05

## 2023-05-15 RX ADMIN — HYDRALAZINE HYDROCHLORIDE 100 MG: 50 TABLET, FILM COATED ORAL at 06:05

## 2023-05-15 RX ADMIN — FAMOTIDINE 20 MG: 20 TABLET, FILM COATED ORAL at 08:13

## 2023-05-15 ASSESSMENT — PAIN SCALES - GENERAL
PAINLEVEL_OUTOF10: 0
PAINLEVEL_OUTOF10: 10

## 2023-05-15 ASSESSMENT — PAIN DESCRIPTION - LOCATION: LOCATION: HEAD

## 2023-05-16 LAB
GLUCOSE BLD STRIP.AUTO-MCNC: 106 MG/DL (ref 70–110)
GLUCOSE BLD STRIP.AUTO-MCNC: 111 MG/DL (ref 70–110)
GLUCOSE BLD STRIP.AUTO-MCNC: 126 MG/DL (ref 70–110)

## 2023-05-16 PROCEDURE — 1100000003 HC PRIVATE W/ TELEMETRY

## 2023-05-16 PROCEDURE — 97110 THERAPEUTIC EXERCISES: CPT

## 2023-05-16 PROCEDURE — 6360000002 HC RX W HCPCS: Performed by: FAMILY MEDICINE

## 2023-05-16 PROCEDURE — 82962 GLUCOSE BLOOD TEST: CPT

## 2023-05-16 PROCEDURE — 90935 HEMODIALYSIS ONE EVALUATION: CPT

## 2023-05-16 PROCEDURE — 6370000000 HC RX 637 (ALT 250 FOR IP): Performed by: FAMILY MEDICINE

## 2023-05-16 PROCEDURE — 6360000002 HC RX W HCPCS: Performed by: STUDENT IN AN ORGANIZED HEALTH CARE EDUCATION/TRAINING PROGRAM

## 2023-05-16 PROCEDURE — 6370000000 HC RX 637 (ALT 250 FOR IP): Performed by: STUDENT IN AN ORGANIZED HEALTH CARE EDUCATION/TRAINING PROGRAM

## 2023-05-16 PROCEDURE — 2580000003 HC RX 258: Performed by: FAMILY MEDICINE

## 2023-05-16 PROCEDURE — 97116 GAIT TRAINING THERAPY: CPT

## 2023-05-16 PROCEDURE — 2700000000 HC OXYGEN THERAPY PER DAY

## 2023-05-16 RX ADMIN — ASPIRIN 81 MG: 81 TABLET, COATED ORAL at 08:32

## 2023-05-16 RX ADMIN — SODIUM CHLORIDE, PRESERVATIVE FREE 10 ML: 5 INJECTION INTRAVENOUS at 22:08

## 2023-05-16 RX ADMIN — FAMOTIDINE 20 MG: 20 TABLET, FILM COATED ORAL at 08:32

## 2023-05-16 RX ADMIN — HYDRALAZINE HYDROCHLORIDE 100 MG: 50 TABLET, FILM COATED ORAL at 05:47

## 2023-05-16 RX ADMIN — SEVELAMER CARBONATE 1600 MG: 800 TABLET, FILM COATED ORAL at 18:02

## 2023-05-16 RX ADMIN — LABETALOL HYDROCHLORIDE 200 MG: 200 TABLET, FILM COATED ORAL at 08:32

## 2023-05-16 RX ADMIN — EPOETIN ALFA-EPBX 10000 UNITS: 10000 INJECTION, SOLUTION INTRAVENOUS; SUBCUTANEOUS at 02:25

## 2023-05-16 RX ADMIN — ISOSORBIDE MONONITRATE 60 MG: 60 TABLET, EXTENDED RELEASE ORAL at 08:33

## 2023-05-16 RX ADMIN — DOXAZOSIN 4 MG: 4 TABLET ORAL at 08:33

## 2023-05-16 RX ADMIN — HEPARIN SODIUM 5000 UNITS: 5000 INJECTION INTRAVENOUS; SUBCUTANEOUS at 05:47

## 2023-05-16 RX ADMIN — AMLODIPINE BESYLATE 10 MG: 5 TABLET ORAL at 08:32

## 2023-05-16 RX ADMIN — HEPARIN SODIUM 5000 UNITS: 5000 INJECTION INTRAVENOUS; SUBCUTANEOUS at 14:36

## 2023-05-16 RX ADMIN — HEPARIN SODIUM 5000 UNITS: 5000 INJECTION INTRAVENOUS; SUBCUTANEOUS at 21:54

## 2023-05-16 RX ADMIN — LOSARTAN POTASSIUM 100 MG: 50 TABLET, FILM COATED ORAL at 08:33

## 2023-05-16 RX ADMIN — HYDRALAZINE HYDROCHLORIDE 100 MG: 50 TABLET, FILM COATED ORAL at 14:36

## 2023-05-16 RX ADMIN — SPIRONOLACTONE 100 MG: 100 TABLET ORAL at 08:33

## 2023-05-16 RX ADMIN — HEPARIN SODIUM 4600 UNITS: 1000 INJECTION INTRAVENOUS; SUBCUTANEOUS at 01:59

## 2023-05-16 RX ADMIN — SODIUM CHLORIDE, PRESERVATIVE FREE 10 ML: 5 INJECTION INTRAVENOUS at 08:40

## 2023-05-16 RX ADMIN — HYDRALAZINE HYDROCHLORIDE 100 MG: 50 TABLET, FILM COATED ORAL at 21:54

## 2023-05-16 RX ADMIN — CALCITRIOL CAPSULES 0.25 MCG 0.5 MCG: 0.25 CAPSULE ORAL at 08:33

## 2023-05-16 RX ADMIN — LABETALOL HYDROCHLORIDE 200 MG: 200 TABLET, FILM COATED ORAL at 21:54

## 2023-05-16 ASSESSMENT — PAIN SCALES - GENERAL
PAINLEVEL_OUTOF10: 0
PAINLEVEL_OUTOF10: 0

## 2023-05-16 NOTE — PLAN OF CARE
Problem: Chronic Conditions and Co-morbidities  Goal: Patient's chronic conditions and co-morbidity symptoms are monitored and maintained or improved  5/15/2023 2303 by Jaleesa Lopez RN  Outcome: Progressing  5/15/2023 2043 by Adalberto Padgett RN  Outcome: Progressing  5/15/2023 1004 by Rk Campos RN  Outcome: Progressing     INTERVENTION:  HEMODYNAMIC STABILIZATION  MAINTAIN BP WNL WHILE ON HD. INTERVENTION:  FLUID MANAGEMENT  WILL ATTEMPT 4500ML TOTAL FLUID REMOVAL AS TOLERATED. INTERVENTION:  METABOLIC/ELECTROLYTE MANAGEMENT  2.0 POTASSIUM 3.0 CALCIUM DIALYSATE USED WITH HD TODAY. INTERVENTION:  HEMODIALYSIS ACCESS SITE MANAGEMENT  LEFT CHEST TDC ACCESSED USING ASEPTIC TECHNIQUE. GOAL:  SIGNS AND SYMPTOMS OF LISTED POTENTIAL PROBLEMS WILL BE ABSENT OR MANAGEABLE. OUTCOME:  PROGRESSING. HD PLANNED FOR 4 HOURS TODAY.

## 2023-05-16 NOTE — PLAN OF CARE
Problem: Discharge Planning  Goal: Discharge to home or other facility with appropriate resources  5/15/2023 2043 by Louis Arriaza RN  Outcome: Progressing  5/15/2023 1004 by Sadie Simental RN  Outcome: Progressing     Problem: Safety - Adult  Goal: Free from fall injury  5/15/2023 2043 by Louis Arriaza RN  Outcome: Progressing  5/15/2023 1004 by Sadie Simental RN  Outcome: Progressing     Problem: Chronic Conditions and Co-morbidities  Goal: Patient's chronic conditions and co-morbidity symptoms are monitored and maintained or improved  5/15/2023 2043 by Louis Arriaza RN  Outcome: Progressing  5/15/2023 1004 by Sadie Simental RN  Outcome: Progressing     Problem: Pain  Goal: Verbalizes/displays adequate comfort level or baseline comfort level  5/15/2023 2043 by Louis Arriaza RN  Outcome: Progressing  5/15/2023 1004 by Sadie Simental RN  Outcome: Progressing     Problem: Skin/Tissue Integrity  Goal: Absence of new skin breakdown  Description: 1. Monitor for areas of redness and/or skin breakdown  2. Assess vascular access sites hourly  3. Every 4-6 hours minimum:  Change oxygen saturation probe site  4. Every 4-6 hours:  If on nasal continuous positive airway pressure, respiratory therapy assess nares and determine need for appliance change or resting period.   5/15/2023 2043 by Louis Arriaza RN  Outcome: Progressing  5/15/2023 1004 by Sadie Simental RN  Outcome: Progressing

## 2023-05-17 VITALS
SYSTOLIC BLOOD PRESSURE: 154 MMHG | BODY MASS INDEX: 27.92 KG/M2 | RESPIRATION RATE: 20 BRPM | DIASTOLIC BLOOD PRESSURE: 84 MMHG | TEMPERATURE: 98 F | WEIGHT: 177.91 LBS | OXYGEN SATURATION: 98 % | HEIGHT: 67 IN | HEART RATE: 70 BPM

## 2023-05-17 LAB
ANION GAP SERPL CALC-SCNC: 11 MMOL/L (ref 3–18)
BUN SERPL-MCNC: 45 MG/DL (ref 7–18)
BUN/CREAT SERPL: 6 (ref 12–20)
CALCIUM SERPL-MCNC: 8.6 MG/DL (ref 8.5–10.1)
CHLORIDE SERPL-SCNC: 104 MMOL/L (ref 100–111)
CO2 SERPL-SCNC: 21 MMOL/L (ref 21–32)
CREAT SERPL-MCNC: 7.19 MG/DL (ref 0.6–1.3)
GLUCOSE BLD STRIP.AUTO-MCNC: 101 MG/DL (ref 70–110)
GLUCOSE BLD STRIP.AUTO-MCNC: 109 MG/DL (ref 70–110)
GLUCOSE SERPL-MCNC: 112 MG/DL (ref 74–99)
HCT VFR BLD AUTO: 27.5 % (ref 36–48)
HGB BLD-MCNC: 9 G/DL (ref 13–16)
PHOSPHATE SERPL-MCNC: 5.4 MG/DL (ref 2.5–4.9)
POTASSIUM SERPL-SCNC: 5.8 MMOL/L (ref 3.5–5.5)
SODIUM SERPL-SCNC: 136 MMOL/L (ref 136–145)

## 2023-05-17 PROCEDURE — 6370000000 HC RX 637 (ALT 250 FOR IP): Performed by: FAMILY MEDICINE

## 2023-05-17 PROCEDURE — 6360000002 HC RX W HCPCS: Performed by: FAMILY MEDICINE

## 2023-05-17 PROCEDURE — 90935 HEMODIALYSIS ONE EVALUATION: CPT

## 2023-05-17 PROCEDURE — 84100 ASSAY OF PHOSPHORUS: CPT

## 2023-05-17 PROCEDURE — 80048 BASIC METABOLIC PNL TOTAL CA: CPT

## 2023-05-17 PROCEDURE — 2580000003 HC RX 258: Performed by: FAMILY MEDICINE

## 2023-05-17 PROCEDURE — 85018 HEMOGLOBIN: CPT

## 2023-05-17 PROCEDURE — 36415 COLL VENOUS BLD VENIPUNCTURE: CPT

## 2023-05-17 PROCEDURE — 6370000000 HC RX 637 (ALT 250 FOR IP): Performed by: STUDENT IN AN ORGANIZED HEALTH CARE EDUCATION/TRAINING PROGRAM

## 2023-05-17 PROCEDURE — 2700000000 HC OXYGEN THERAPY PER DAY

## 2023-05-17 PROCEDURE — 97116 GAIT TRAINING THERAPY: CPT

## 2023-05-17 PROCEDURE — 85014 HEMATOCRIT: CPT

## 2023-05-17 PROCEDURE — 82962 GLUCOSE BLOOD TEST: CPT

## 2023-05-17 RX ORDER — CALCITRIOL 0.5 UG/1
0.5 CAPSULE, LIQUID FILLED ORAL DAILY
Qty: 30 CAPSULE | Refills: 3 | Status: SHIPPED | OUTPATIENT
Start: 2023-05-17

## 2023-05-17 RX ORDER — LABETALOL 200 MG/1
200 TABLET, FILM COATED ORAL 2 TIMES DAILY
Qty: 60 TABLET | Refills: 3 | Status: SHIPPED | OUTPATIENT
Start: 2023-05-17

## 2023-05-17 RX ORDER — HYDRALAZINE HYDROCHLORIDE 100 MG/1
100 TABLET, FILM COATED ORAL EVERY 8 HOURS SCHEDULED
Qty: 90 TABLET | Refills: 3 | Status: SHIPPED | OUTPATIENT
Start: 2023-05-17

## 2023-05-17 RX ORDER — DOXAZOSIN MESYLATE 4 MG/1
4 TABLET ORAL DAILY
Qty: 30 TABLET | Refills: 3 | Status: SHIPPED | OUTPATIENT
Start: 2023-05-17

## 2023-05-17 RX ORDER — LOSARTAN POTASSIUM 100 MG/1
100 TABLET ORAL DAILY
Qty: 30 TABLET | Refills: 3 | Status: SHIPPED | OUTPATIENT
Start: 2023-05-17

## 2023-05-17 RX ADMIN — SEVELAMER CARBONATE 1600 MG: 800 TABLET, FILM COATED ORAL at 12:42

## 2023-05-17 RX ADMIN — DOXAZOSIN 4 MG: 4 TABLET ORAL at 08:56

## 2023-05-17 RX ADMIN — ASPIRIN 81 MG: 81 TABLET, COATED ORAL at 08:56

## 2023-05-17 RX ADMIN — FAMOTIDINE 20 MG: 20 TABLET, FILM COATED ORAL at 08:56

## 2023-05-17 RX ADMIN — SODIUM CHLORIDE, PRESERVATIVE FREE 10 ML: 5 INJECTION INTRAVENOUS at 08:57

## 2023-05-17 RX ADMIN — SEVELAMER CARBONATE 1600 MG: 800 TABLET, FILM COATED ORAL at 17:53

## 2023-05-17 RX ADMIN — HYDRALAZINE HYDROCHLORIDE 100 MG: 50 TABLET, FILM COATED ORAL at 17:53

## 2023-05-17 RX ADMIN — HEPARIN SODIUM 5000 UNITS: 5000 INJECTION INTRAVENOUS; SUBCUTANEOUS at 17:53

## 2023-05-17 RX ADMIN — SEVELAMER CARBONATE 1600 MG: 800 TABLET, FILM COATED ORAL at 08:56

## 2023-05-17 RX ADMIN — HYDRALAZINE HYDROCHLORIDE 100 MG: 50 TABLET, FILM COATED ORAL at 06:02

## 2023-05-17 RX ADMIN — HEPARIN SODIUM 5000 UNITS: 5000 INJECTION INTRAVENOUS; SUBCUTANEOUS at 06:02

## 2023-05-17 RX ADMIN — CALCITRIOL CAPSULES 0.25 MCG 0.5 MCG: 0.25 CAPSULE ORAL at 08:56

## 2023-05-17 ASSESSMENT — PAIN SCALES - GENERAL
PAINLEVEL_OUTOF10: 0

## 2023-05-17 NOTE — PROGRESS NOTES
5/15/2023 PT note: consult received and chart reviewed. Evaluation attempted. Pt currently off unit for Stress Test.  Will f/u at later time as pt schedule allows for PT evaluation.  Thank you for this referral.   Frances, PT
Cardiology Progress Note        Patient: Ginette Lang        Sex: male          DOA: 5/8/2023  YOB: 1970      Age:  50 y.o.        LOS:  LOS: 7 days   Assessment/Plan     Principal Problem:    Hypertensive urgency  Active Problems:    Non-compliance with renal dialysis    Iron deficiency anemia    Hx of BKA, right (HCC)    Volume overload    Chronic anemia    End-stage renal disease needing dialysis [facility])    Psychiatric illness    Hypoxia    Dyspnea  Resolved Problems:    Acute respiratory failure with hypoxemia ([address]      Plan:  5/15/2023  Telemetry sinus rhythm  No chest pain or shortness of breath  Stress test is negative for ischemia  Finding discussed in detail with patient discussed with patient continue to follow with [facility] cardiology and [facility] cardiothoracic surgical program for management of the mitral valve disease  Continue with dialysis and antihypertensive treatment. 5/14/2023  Patient remains in sinus rhythm. He complains of no chest pain or shortness of breath at this time. Stress test on Monday. Dr. Leroy Ramsey returns on Monday. 5/13/2023  Rhythm is sinus and stable. No chest pain or shortness of breath. Continue medications and dialysis as indicated. Possible stress test on Monday 5/12/2023  Telemetry normal sinus rhythm stable  No chest pain  No new complaints  Continue with current antihypertensive medication  Continue with dialysis  Appreciated pulmonary consult  Dr. Shira Spence will be covering during the weekend  May need a stress test on Monday if hemoglobin stable  Discussed with Dr. Marychuy Alarcon of breath with hypoxemia  End-stage renal disease on dialysis  Chronic diastolic heart failure  History of mitral valve infective endocarditis with severe mitral annular calcification, patient underwent extensive work-up in [facility] he was recommended to have follow-up in [facility] for nontraditional mitral valve replacement treatment.   Patient was seen
Cardiology Progress Note        Patient: Sanford Bernardo        Sex: male          DOA: 5/8/2023  YOB: 1970      Age:  50 y.o.        LOS:  LOS: 9 days   Assessment/Plan     Principal Problem:    Hypertensive urgency  Active Problems:    Non-compliance with renal dialysis    Iron deficiency anemia    Hx of BKA, right (HCC)    Volume overload    Chronic anemia    End-stage renal disease needing dialysis [facility])    Psychiatric illness    Hypoxia    Dyspnea  Resolved Problems:    Acute respiratory failure with hypoxemia ([address]      Plan:  5/17/2023  No chest pain  No difficulty in breathing  Cardiac telemetry stable  Patient is being discharged  Discussed with patient again follow-up with [facility] cardiology and [facility] cardiology. Patient expressed verbal understanding. Continue with risk factors management. 5/16/2023  No chest pain  No shortness of breath  Cardiac telemetry stable  Nonischemic stress test  Discussed again with the patient follow-up with [facility] cardiac and cardiothoracic surgery as well as [facility] cardiology  Continue with current antihypertensive medications. 5/15/2023  Telemetry sinus rhythm  No chest pain or shortness of breath  Stress test is negative for ischemia  Finding discussed in detail with patient discussed with patient continue to follow with [facility] cardiology and [facility] cardi[facility] surgical program for management of the mitral valve disease  Continue with dialysis and antihypertensive treatment. 5/14/2023  Patient remains in sinus rhythm. He complains of no chest pain or shortness of breath at this time. Stress test on Monday. Dr. Andrea Jung returns on Monday. 5/13/2023  Rhythm is sinus and stable. No chest pain or shortness of breath. Continue medications and dialysis as indicated.   Possible stress test on Monday 5/12/2023  Telemetry normal sinus rhythm stable  No chest pain  No new complaints  Continue with current antihypertensive
Hospitalist Progress Note-critical care note     Patient: Diandra Reyesr MRN: [id number]  CSN: [id number]    YOB: 1970  Age: 50 y.o. Sex: male    DOA: 5/8/2023 LOS:  LOS: 7 days            Chief complaint: hypoxia , anemia esrd on hd     Assessment/Plan         Active Hospital Problems    Diagnosis Date Noted    Dyspnea [R06.00] 05/12/2023    Hypoxia [R09.02] 05/08/2023    Hypertensive urgency [I16.0] 04/22/2023    Psychiatric illness [F99] 04/22/2023    End-stage renal disease needing dialysis ([address] [N18.6, Z99.2] 02/06/2023    Chronic anemia [D64.9] 08/27/2022    Volume overload [E87.70] 06/20/2022    Non-compliance with renal dialysis [Z91.158] 08/27/2021    Hx of BKA, right ([address] [Z89.511] 01/29/2020    Iron deficiency anemia [D50.9] 03/04/2018        Hypoxia-continue weaning   Like due to puml edema down to 2 L  AM   Received hd    Cta no PE   Pulm consult      Hypertensive urgency , htn   Off gtt, bp controlled per po meds   Continue current medication -on 7 medication for bp control   Cardiologist is consulted -recommend to f[facility] with [facility]ologist, stress test today       ESRD -  have Dialysis 2 days in all   Not compliance      Chronic anemia  Received blood transfusion yesterday   Continue monitoring. On retacrit       PVD -  Continue on aspirin     SOB-resolved     Hx of Hypoglycemia will put hypoglycemia protocol       Right BKA/PVD        Medical noncompliance     Psych continue to follow up with psych as needed       Subjective  ready for the test   Disposition :1-2 days   Review of systems:    General: No fevers or chills. Cardiovascular: No chest pain or pressure. No palpitations. Pulmonary: no shortness of breath. Gastrointestinal: No nausea, vomiting.      Vital signs/Intake and Output:  Visit Vitals  BP (!) 159/67   Pulse 78   Temp 98 °F (36.7 °C) (Oral)   Resp 19   Ht 5' 7\" (1.702 m)   Wt 178 lb 12.7 oz (81.1 kg)   SpO2 100%   BMI 28.00 kg/m²     Current Shift:  No
Hospitalist Progress Note-critical care note     Patient: Margot Roldan MRN: [id number]  CSN: [id number]    YOB: 1970  Age: 50 y.o. Sex: male    DOA: 5/8/2023 LOS:  LOS: 8 days            Chief complaint: hypoxia , anemia esrd on hd     Assessment/Plan         Active Hospital Problems    Diagnosis Date Noted    Dyspnea [R06.00] 05/12/2023    Hypoxia [R09.02] 05/08/2023    Hypertensive urgency [I16.0] 04/22/2023    Psychiatric illness [F99] 04/22/2023    End-stage renal disease needing dialysis ([facility] [N18.6, Z99.2] 02/06/2023    Chronic anemia [D64.9] 08/27/2022    Volume overload [E87.70] 06/20/2022    Non-compliance with renal dialysis [Z91.158] 08/27/2021    Hx of BKA, right ([facility] [Z89.511] 01/29/2020    Iron deficiency anemia [D50.9] 03/04/2018        Hypoxia-continue weaning off   Removed 4 L fluid yesterday Received hd    Cta no PE   Pulm consult      Hypertensive urgency , htn   Off gtt, bp controlled per po meds   Continue current medication -on 7 medication for bp control   Cardiologist is consulted -recommend to [facility] with [facility]ologist, stress test  negative        ESRD -  have Dialysis tomorrow   Not compliance      Chronic anemia  Received blood transfusion yesterday   Continue monitoring. On retacrit       PVD -  Continue on aspirin     SOB-resolved     Hx of Hypoglycemia will put hypoglycemia protocol       Right BKA/PVD        Medical noncompliance     Psych continue to follow up with psych as needed       Subjective  I took my medication and home   Encourage pt to make an appointment with [facility] cardiologist asap   Disposition tomorrow after hd   Review of systems:    General: No fevers or chills. Cardiovascular: No chest pain or pressure. No palpitations. Pulmonary: no shortness of breath. Gastrointestinal: No nausea, vomiting.      Vital signs/Intake and Output:  Visit Vitals  BP (!) 162/69   Pulse 88   Temp 98.7 °F (37.1 °C) (Oral)   Resp 18   Ht 5' 7\" (1.702 m)   Wt 177 lb
Patient may resume diet on arrival to unit post stress test.
Patient to DC home with [facility]. Orders placed. Patient to resume outpatient dialysis services.
Physical Therapy Goals:  Initiated 5/15/2023 to be met within 7-10 days. Short Term Goals  Time Frame for Short Term Goals: 7-10 days  Short Term Goal 1: Ptatient will demonstrate mod I/[place] in bed mobility for home performance. Short Term Goal 2: Patient will demonstrate mod I sit <> stand with RW in prep for ambulation. Short Term Goal 3: Patient will demonstrate donning/doffing of R BK prosthesis in prep for ambulation. Short Term Goal 4: Patient will demonstrate ambulation with RW using R BK prosthesis 100ft for increased functional mobility at discharge. PHYSICAL THERAPY EVALUATION    Patient: Angélica Skaggs (75 y.o. male)  Date: 5/15/2023  Primary Diagnosis: Hypoxia [R09.02]  ESRD (end stage renal disease) on dialysis ([facility] [N18.6, Z99.2]  Hypertensive urgency [I16.0]  Other hypervolemia [E87.79]     Precautions: Fall Risk  PLOF: Lives in ground level apartment alone; ambulates with rollator. ASSESSMENT :  The patient is seen on telemetry unit following admission for above diagnosis. Pt found resting in bed and c/o HA 10/10 since receiving medication (contrast) for stress test this am.  Nephrologist in to see pt during session. Physician and nurse made aware of above pain. Nurse Tanvir Vincent in to give pt Tylenol during session. Pt presents with decreased independence in overall functional mobility, r/t decr'd functional strength (muscle wasting as noted below), pain from HA as noted above, and impaired bed mobility and transfers, balance, gait and decr'd activity tolerance. Demonstrates supine <> sit with supervision. EOB sitting  tolerated <5 min before pt returned to supine and left in R side lying position with all needs in reach. Note pt with R BK prosthesis standing up in window seal.  Pt reports using same daily and no falls. Pt may benefit from continued IPPT to address deficits and goals above.    Recommend HHPT for follow up physical therapy upon discharge to reach maximal
Pulse oximetry assessment   99% at rest on room air (if 88% or less, skip next steps)  94% while ambulating on room air
RENAL CONSULT PROGRESS NOTE   2023    Patient:  Tretha Schwab  :  1970  Gender:  male  MRN #:  [id number]    Reason for Consult: Hypoxia and ESRD. Subjective:   He is off oxygen this morning, breathing stable  BP improving   No nausea and abdominal pain  No other symptoms     Objective:    BP (!) 157/50   Pulse 81   Temp 97.9 °F (36.6 °C) (Oral)   Resp 18   Ht 5' 7\" (1.702 m)   Wt 177 lb 14.6 oz (80.7 kg)   SpO2 97%   BMI 27.86 kg/m²     Physical Exam:    Pt awake,  alert and comfortable  Lung: no  crackles in lung base       Ext:   no edema in LLE, RLE  BKA     Laboratory Data:    Lab Results   Component Value Date    BUN 37 (H) 2023    BUN 37 (H) 2023    BUN 26 (H) 2023     (L) 2023     (L) 2023     2023    CO2 27 2023    CO2 26 2023    CO2 26 2023    GLUCOSE 83 2023    GLUCOSE 93 2023    GLUCOSE 97 2023     Lab Results   Component Value Date    WBC 6.0 2023    HGB 8.4 (L) 2023    HCT 25.7 (L) 2023     Lab Results   Component Value Date    CALCIUM 8.3 (L) 2023     Lab Results   Component Value Date    HDL 62 (H) 2022     No results found for: SPECIMENTYP, TURBIDITY    Imaging Reveiwed:    Xray     CTA chest- IMPRESSION:  Interstitial edema and trace bilateral pleural effusions. No evidence of pulmonary embolism. Assessment:  Tretha Schwab is a 50y.o. year old male ESRD on HD,  DM-2, anemia, uncontrolled hypertension , hyperphosphatemia non compliance to dialysis prescription, dietary/life style modification  was sent from outpatient dialysis unit for shortness of breath and  Hypoxia.  On admission SPO2 was 85% and SBP > 200 mmhg , hyperkalemia ,  CXR showed interstitial edema and ABG showed hypoxia  on admission   Which is clinically improving now         Pulmonary edema- improved   Hypertensive urgency - improving   ESRD/Non compliance  Hyperphosphatemia
RENAL CONSULT PROGRESS NOTE   5/15/2023    Patient:  Morgan Christensen  :  1970  Gender:  male  MRN #:  [id number]    Reason for Consult: Hypoxia and ESRD. Subjective:   Saw him after stress test , says he has headache, no nausea and vomiting   Reports breathing is improved but not at baseline   On nasal canula  No diarrhea     Objective:    BP (!) 144/54   Pulse 74   Temp 97.7 °F (36.5 °C) (Oral)   Resp 18   Ht 5' 7\" (1.702 m)   Wt 178 lb 12.7 oz (81.1 kg)   SpO2 95%   BMI 28.00 kg/m²     Physical Exam:    Pt awake,  alert and comfortable  Lung: no  crackles in lung base       Ext:   no edema in LLE, RLE  BKA     Laboratory Data:    Lab Results   Component Value Date    BUN 37 (H) 2023    BUN 37 (H) 2023    BUN 26 (H) 2023     (L) 2023     (L) 2023     2023    CO2 27 2023    CO2 26 2023    CO2 26 2023    GLUCOSE 83 2023    GLUCOSE 93 2023    GLUCOSE 97 2023     Lab Results   Component Value Date    WBC 6.0 2023    HGB 8.4 (L) 2023    HCT 25.7 (L) 2023     Lab Results   Component Value Date    CALCIUM 8.3 (L) 2023     Lab Results   Component Value Date    HDL 62 (H) 2022     No results found for: SPECIMENTYP, TURBIDITY    Imaging Reveiwed:    Xray     CTA chest- IMPRESSION:  Interstitial edema and trace bilateral pleural effusions. No evidence of pulmonary embolism. Assessment:  Morgan Christensen is a 50y.o. year old male ESRD on HD,  DM-2, anemia, uncontrolled hypertension , hyperphosphatemia non compliance to dialysis prescription, dietary/life style modification  was sent from outpatient dialysis unit for shortness of breath and  Hypoxia.  On admission SPO2 was 85% and SBP > 200 mmhg , hyperkalemia ,  CXR showed interstitial edema and ABG showed hypoxia  on admission   Which is clinically improving now         Pulmonary edema- improved   Hypertensive urgency - improving
Received pre HD report from Ally Chinchilla RN . Pt in bed, A+O x4, no s/s of distress noted. Accessed left IJ TDC per protocol. Tx initiated at 2223. TDC flowing with ease. For hemodynamic stability UF goal 4500ml. Offered assistance with repositioning every hour. Vascular access visible at all times during treatment, line connections intact at all times. Tx completed at 0254, tolerated well 4L removed. De-accessed per protocol. Heparin indwell 2.3ml in arterial, and 2.3ml in venous catheter. Unit nurse given report.
compliance  Hyperphosphatemia   Secondary hyperparathyroidism    Anemia         Plan:    Patient examined and available labs reviewed , no clinical and metabolic indications of dialysis today. Need to assess if he needs home oxygen per pulmonary     Salt and potassium restricted diet . Hypertensive urgency : BP better   Continue  amlodipine 10 mg,  losartan 100 mg ,  clonidine patch 0.3 mg/week, labetalol 200 mg BID , aldactone 100 mg  , cardura 4 mg   Hydralazine 100   mg TID      Epogen for anemia of CKD during HD as indicated   Adequate iron stores   Transfusion to keep hemoglobin above 7 gram/dl         BMD- reviewed- calcitriol for hypocalcemia   Sevelamer for hyperphosphatemia          I have reviewed patient's record for pertinent labs, radiology and other test . I have reviewed old records. I have ordered labs, medications and radiology as necessary and indicated per patient's condition . Total time spent is approximately 36 minutes. Greater than 50 % of that time was spent in counseling and or care coordination.          Loren Weaver MD, MD  [facility].- Nephrology
recommendations to determine the most appropriate discharge setting. Patient's social support, diagnosis, medical stability, and prior level of function should also be taken into consideration. SUBJECTIVE:   Patient stated Subjective: I walked today. OBJECTIVE DATA SUMMARY:   Critical Behavior:  WNL    Functional Mobility Training:  Bed Mobility:  Bed Mobility Training  Overall Level of Assistance: Modified independent  Supine to Sit: Modified independent  Scooting: Modified independent  Transfers:  Transfer Training  Transfer Training: Yes  Overall Level of Assistance: Supervision  Interventions: Safety awareness training  Sit to Stand: Supervision  Stand to Sit: Supervision  Balance:  Balance  Sitting: Intact  Standing: With support   Wheelchair Mobility:      Ambulation/Gait Training:     Gait Training  Gait Training: Yes  Gait  Overall Level of Assistance: Contact-guard assistance;Stand-by assistance  Interventions: Safety awareness training  Gait Abnormalities: Step to gait  Assistive Device: Gait belt;Walker, rolling    Therapeutic Exercises:   Seated: LAQ x 15; Standing Marches x 20    Pain:  Pain level pre-treatment: 0/10  Pain level post-treatment: 0/10   Pain Intervention(s): Rest, Ice, Repositioning   Response to intervention: Nurse notified  Activity Tolerance:   Fair   Please refer to the flowsheet for vital signs taken during this treatment.   After treatment:   [x] Patient left in no apparent distress sitting up EOB  [] Patient left in no apparent distress in bed  [x] Call bell left within reach  [] Nursing notified  [] Caregiver present  [] Bed alarm activated  [] SCDs applied      COMMUNICATION/EDUCATION:   Patient Education  Education Given To: Patient  Education Provided: Fall Prevention Strategies  Education Method: Verbal  Education Outcome: Verbalized understanding;Demonstrated understanding      Bill Cha PT  Minutes: [address] [place] [address]
TATYANA LORD PTA  Minutes: [address] AM-PAC® Basic Mobility Inpatient Short Form (6-Clicks) Version 2    How much HELP from another person does the patient currently need    (If the patient hasn't done an activity recently, how much help from another person do you think he/she would need if he/she tried?)   Total (Total A or Dep)   A Lot  (Mod to Max A)   A Little (Sup or Min A)   None (Mod I to I)   Turning from your back to your side while in a flat bed without using bedrails? [] 1 [] 2 [] 3 [x] 4   2. Moving from lying on your back to sitting on the side of a flat bed without using bedrails? [] 1 [] 2 [] 3 [x] 4   3. Moving to and from a bed to a chair (including a wheelchair)? [] 1 [] 2 [x] 3 [] 4   4. Standing up from a chair using your arms (e.g., wheelchair, or bedside chair)? [] 1 [] 2 [x] 3 [] 4   5. Walking in hospital room? [] 1 [] 2 [x] 3 [] 4   6. Climbing 3-5 steps with a railing?+   [] 1 [] 2 [] 3 [] 4   +If stair climbing cannot be assessed, skip item #6. Sum responses from items 1-5. Current research shows that an AM-PAC score of 18 (14 without stairs) or greater is associated with a discharge to the patient's home setting. Based on an AM-PAC score of /24 (or 17/20 if omitting stairs) and their current functional mobility deficits, it is recommended that the patient have 2-3 sessions per week of Physical Therapy at d/c to increase the patient's independence.
Reviewed:  Procedures/imaging: see electronic medical records for all procedures/Xrays   and details which were not copied into this note but were reviewed prior to creation of Plan        Recent Labs     05/14/23  0603   WBC 6.0   HGB 8.4*   HCT 25.7*          Recent Labs     05/14/23  0603   *   K 4.1   CL 98*   CO2 27   BUN 37*         RADIOLOGY:  CTA CHEST W WO CONTRAST    Result Date: 5/11/2023  Interstitial edema and trace bilateral pleural effusions. No evidence of pulmonary embolism. Cardiology Procedures:   No results found for this or any previous visit. 05/08/23    TRANSTHORACIC ECHOCARDIOGRAM (TTE) LIMITED (CONTRAST/BUBBLE/3D PRN) 05/09/2023  1:03 PM (Final)    Interpretation Summary    Left Ventricle: Normal left ventricular systolic function with a visually estimated EF of 55 - 60%. Left ventricle size is normal. Mildly increased wall thickness. Findings consistent with mild concentric hypertrophy. Normal wall motion. Grade II diastolic dysfunction present with normal LV EF. Mitral Valve: Severe annular calcification at the posterior leaflet of the mitral valve. Mild regurgitation. No stenosis noted. Tricuspid Valve: Not well visualized. The estimated PASP is 27 mmHg. Signed by: María Singh MD on 5/9/2023  1:03 PM    No results found for this or any previous visit.                 Signed By: Amanda Lee MD     May 16, 2023

## 2023-05-17 NOTE — DISCHARGE SUMMARY
[facility] SUMMARY    Name:  Ritchie Chavarria  MR#:   [id number]  :  1970  ACCOUNT #:  [de-identified]  ADMIT DATE:  2023  DISCHARGE DATE:    DATE OF DISCHARGE:  2023    The patient is anticipated to go home today with home health. The patient's consultants here:  Dr. Florentino Zaragoza, Cardiology; Dr. David Garcia, Nephrology; Dr. Daniela Marcus, Pulmonology. DISCHARGE DIAGNOSES:  1. Acute hypoxia with fluid overload. 2.  Hypertensive urgency. 3.  End-stage renal disease. 4.  Medical noncompliance. 5.  Chronic anemia of kidney disease. 6.  Peripheral vascular disease. 7.  History of hypoglycemia. 8.  Right below-knee amputation. HOSPITAL SUMMARY:  This is a 43-year-old gentleman, well-known to the medical service. He has a history of notable noncompliance with dialysis, and had been here not too long ago for acute illness at dialysis, had an extensive hospitalization at that time, was able to discharge home safely, came back on  here with pulmonary edema and hypoxia. He went for urgent dialysis. His last echo showed ejection fraction of 55% of 60% in 2023. He did have hypertensive urgency also, fluid overload. He is usually supposed to be on Monday, Wednesday, Friday dialysis. He does have secondary hyperparathyroidism, diabetes, anemia. The patient is a full code. He was hospitalized and followed by Nephrology, Cardiology, and Pulmonology. Apparently, he has undergone multiple transesophageal echocardiograms at [facility] and has completed antibiotic therapy, but he was supposed to have followup with [facility] which he has not done. This has been emphasized with him emphatically here that he needs to have followup at [address] [place] [address] as discussed and recommended at his previous hospitalizations. He has been tolerating his dialysis sessions here. His shortness of breath is markedly better.   He is wearing 1 L of oxygen right now, but I think we can wean him off that safely at this

## 2023-05-17 NOTE — DIALYSIS
05/17/23  Dialysis treatment    Treatment time-4hrs  Fluid removed-3kg  BVP-87  Medications given-none  Hepatitis Status-  Diagnosis-hypertension      Access-  LT CVC, dressing c/d/I. No s/s of infection noted  Treatment-    1345 Dialysis started  1745 Dialysis completed and blood rinsed back.  New caps placed on each port, pt stable    Pre and Post Report given Tanmay Estrella RN    Hepatitis B Surface Ag   Date/Time Value Ref Range Status   05/12/2023 03:20 AM <0.10 <1.00 Index Final     Hep B S Ab   Date/Time Value Ref Range Status   05/12/2023 03:20 AM 4.61 (L) >10.0 mIU/mL Final

## 2023-05-17 NOTE — PLAN OF CARE
Problem: Discharge Planning  Goal: Discharge to home or other facility with appropriate resources  Outcome: Progressing  Flowsheets (Taken 5/16/2023 2015)  Discharge to home or other facility with appropriate resources: Identify barriers to discharge with patient and caregiver     Problem: Safety - Adult  Goal: Free from fall injury  Outcome: Progressing     Problem: Chronic Conditions and Co-morbidities  Goal: Patient's chronic conditions and co-morbidity symptoms are monitored and maintained or improved  Outcome: Progressing  Flowsheets (Taken 5/16/2023 2015)  Care Plan - Patient's Chronic Conditions and Co-Morbidity Symptoms are Monitored and Maintained or Improved: Monitor and assess patient's chronic conditions and comorbid symptoms for stability, deterioration, or improvement     Problem: Pain  Goal: Verbalizes/displays adequate comfort level or baseline comfort level  Outcome: Progressing  Flowsheets  Taken 5/17/2023 0515  Verbalizes/displays adequate comfort level or baseline comfort level: Encourage patient to monitor pain and request assistance  Taken 5/16/2023 2345  Verbalizes/displays adequate comfort level or baseline comfort level: Encourage patient to monitor pain and request assistance  Taken 5/16/2023 2015  Verbalizes/displays adequate comfort level or baseline comfort level:   Encourage patient to monitor pain and request assistance   Assess pain using appropriate pain scale     Problem: Skin/Tissue Integrity  Goal: Absence of new skin breakdown  Description: 1. Monitor for areas of redness and/or skin breakdown  2. Assess vascular access sites hourly  3. Every 4-6 hours minimum:  Change oxygen saturation probe site  4. Every 4-6 hours:  If on nasal continuous positive airway pressure, respiratory therapy assess nares and determine need for appliance change or resting period.   Outcome: Progressing

## 2023-05-17 NOTE — PLAN OF CARE
Problem: Discharge Planning  Goal: Discharge to home or other facility with appropriate resources  5/17/2023 1026 by Nery Srinivasan RN  Outcome: Progressing  5/17/2023 0654 by Leigh Castillo RN  Outcome: Progressing  Flowsheets (Taken 5/16/2023 2015)  Discharge to home or other facility with appropriate resources: Identify barriers to discharge with patient and caregiver     Problem: Safety - Adult  Goal: Free from fall injury  5/17/2023 1026 by Nery Srinivasan RN  Outcome: Progressing  5/17/2023 0654 by Leigh Castillo RN  Outcome: Progressing     Problem: Chronic Conditions and Co-morbidities  Goal: Patient's chronic conditions and co-morbidity symptoms are monitored and maintained or improved  5/17/2023 1026 by Nery Srinivasan RN  Outcome: Progressing  5/17/2023 0654 by Leigh Castillo RN  Outcome: Progressing  Flowsheets (Taken 5/16/2023 2015)  Care Plan - Patient's Chronic Conditions and Co-Morbidity Symptoms are Monitored and Maintained or Improved: Monitor and assess patient's chronic conditions and comorbid symptoms for stability, deterioration, or improvement     Problem: Pain  Goal: Verbalizes/displays adequate comfort level or baseline comfort level  5/17/2023 1026 by Nery Srinivasan RN  Outcome: Progressing  5/17/2023 0654 by Leigh Castillo RN  Outcome: Progressing  Flowsheets  Taken 5/17/2023 0515  Verbalizes/displays adequate comfort level or baseline comfort level: Encourage patient to monitor pain and request assistance  Taken 5/16/2023 2345  Verbalizes/displays adequate comfort level or baseline comfort level: Encourage patient to monitor pain and request assistance  Taken 5/16/2023 2015  Verbalizes/displays adequate comfort level or baseline comfort level:   Encourage patient to monitor pain and request assistance   Assess pain using appropriate pain scale     Problem: Skin/Tissue Integrity  Goal: Absence of new skin breakdown  Description: 1.   Monitor for areas of redness and/or skin

## 2023-05-18 ENCOUNTER — HOME CARE VISIT (OUTPATIENT)
Age: 53
End: 2023-05-18

## 2023-05-20 ENCOUNTER — HOME CARE VISIT (OUTPATIENT)
Age: 53
End: 2023-05-20

## 2023-05-20 NOTE — CASE COMMUNICATION
Attempted to contact pt x3 at all phone numbers and txt the caregiver and 2nd phone number listed, no call back.

## 2023-05-22 ENCOUNTER — HOME CARE VISIT (OUTPATIENT)
Age: 53
End: 2023-05-22

## 2023-05-22 NOTE — CASE COMMUNICATION
3rd attempt to reach patient, he is not answering the phone and unable to leave vm, due to vm being full. Patient is a non-admit. PCP Danney Goltz, MD notified.

## 2023-07-14 ENCOUNTER — APPOINTMENT (OUTPATIENT)
Facility: HOSPITAL | Age: 53
End: 2023-07-14
Payer: MEDICARE

## 2023-07-14 ENCOUNTER — HOSPITAL ENCOUNTER (INPATIENT)
Facility: HOSPITAL | Age: 53
LOS: 7 days | Discharge: HOME | End: 2023-07-21
Attending: EMERGENCY MEDICINE | Admitting: FAMILY MEDICINE
Payer: MEDICARE

## 2023-07-14 DIAGNOSIS — Z99.2 ESRD (END STAGE RENAL DISEASE) ON DIALYSIS (HCC): ICD-10-CM

## 2023-07-14 DIAGNOSIS — N18.6 ESRD (END STAGE RENAL DISEASE) ON DIALYSIS (HCC): ICD-10-CM

## 2023-07-14 DIAGNOSIS — Z91.89: Primary | ICD-10-CM

## 2023-07-14 PROBLEM — T82.7XXA INFECTION OF HEMODIALYSIS TUNNELED CATHETER (HCC): Status: ACTIVE | Noted: 2023-07-14

## 2023-07-14 PROBLEM — R25.8 INVOLUNTARY JERKY MOVEMENTS: Status: ACTIVE | Noted: 2023-07-14

## 2023-07-14 PROBLEM — T82.41XA HEMODIALYSIS CATHETER MALFUNCTION, INITIAL ENCOUNTER (HCC): Status: ACTIVE | Noted: 2023-07-14

## 2023-07-14 LAB
ALBUMIN SERPL-MCNC: 3.3 G/DL (ref 3.4–5)
ALBUMIN/GLOB SERPL: 0.7 (ref 0.8–1.7)
ALP SERPL-CCNC: 88 U/L (ref 45–117)
ALT SERPL-CCNC: 9 U/L (ref 16–61)
ANION GAP SERPL CALC-SCNC: 6 MMOL/L (ref 3–18)
AST SERPL-CCNC: 10 U/L (ref 10–38)
BASOPHILS # BLD: 0 K/UL (ref 0–0.1)
BASOPHILS NFR BLD: 1 % (ref 0–2)
BILIRUB SERPL-MCNC: 0.4 MG/DL (ref 0.2–1)
BUN SERPL-MCNC: 45 MG/DL (ref 7–18)
BUN/CREAT SERPL: 5 (ref 12–20)
CALCIUM SERPL-MCNC: 8.6 MG/DL (ref 8.5–10.1)
CHLORIDE SERPL-SCNC: 105 MMOL/L (ref 100–111)
CO2 SERPL-SCNC: 27 MMOL/L (ref 21–32)
CREAT SERPL-MCNC: 8.37 MG/DL (ref 0.6–1.3)
DIFFERENTIAL METHOD BLD: ABNORMAL
EKG ATRIAL RATE: 97 BPM
EKG DIAGNOSIS: NORMAL
EKG P AXIS: 62 DEGREES
EKG P-R INTERVAL: 196 MS
EKG Q-T INTERVAL: 392 MS
EKG QRS DURATION: 84 MS
EKG QTC CALCULATION (BAZETT): 497 MS
EKG R AXIS: -16 DEGREES
EKG T AXIS: 89 DEGREES
EKG VENTRICULAR RATE: 97 BPM
EOSINOPHIL # BLD: 0.1 K/UL (ref 0–0.4)
EOSINOPHIL NFR BLD: 3 % (ref 0–5)
ERYTHROCYTE [DISTWIDTH] IN BLOOD BY AUTOMATED COUNT: 18.4 % (ref 11.6–14.5)
GLOBULIN SER CALC-MCNC: 4.9 G/DL (ref 2–4)
GLUCOSE BLD STRIP.AUTO-MCNC: 83 MG/DL (ref 70–110)
GLUCOSE SERPL-MCNC: 79 MG/DL (ref 74–99)
HCT VFR BLD AUTO: 29.2 % (ref 36–48)
HGB BLD-MCNC: 9.5 G/DL (ref 13–16)
IMM GRANULOCYTES # BLD AUTO: 0 K/UL (ref 0–0.04)
IMM GRANULOCYTES NFR BLD AUTO: 1 % (ref 0–0.5)
LYMPHOCYTES # BLD: 0.5 K/UL (ref 0.9–3.6)
LYMPHOCYTES NFR BLD: 11 % (ref 21–52)
MCH RBC QN AUTO: 27.3 PG (ref 24–34)
MCHC RBC AUTO-ENTMCNC: 32.5 G/DL (ref 31–37)
MCV RBC AUTO: 83.9 FL (ref 78–100)
MONOCYTES # BLD: 0.5 K/UL (ref 0.05–1.2)
MONOCYTES NFR BLD: 10 % (ref 3–10)
NEUTS SEG # BLD: 3.8 K/UL (ref 1.8–8)
NEUTS SEG NFR BLD: 76 % (ref 40–73)
NRBC # BLD: 0 K/UL (ref 0–0.01)
NRBC BLD-RTO: 0 PER 100 WBC
PLATELET # BLD AUTO: 158 K/UL (ref 135–420)
PMV BLD AUTO: 10.4 FL (ref 9.2–11.8)
POTASSIUM SERPL-SCNC: 4.4 MMOL/L (ref 3.5–5.5)
PROT SERPL-MCNC: 8.2 G/DL (ref 6.4–8.2)
RBC # BLD AUTO: 3.48 M/UL (ref 4.35–5.65)
SODIUM SERPL-SCNC: 138 MMOL/L (ref 136–145)
WBC # BLD AUTO: 5 K/UL (ref 4.6–13.2)

## 2023-07-14 PROCEDURE — 87340 HEPATITIS B SURFACE AG IA: CPT

## 2023-07-14 PROCEDURE — 86706 HEP B SURFACE ANTIBODY: CPT

## 2023-07-14 PROCEDURE — 5A1D70Z PERFORMANCE OF URINARY FILTRATION, INTERMITTENT, LESS THAN 6 HOURS PER DAY: ICD-10-PCS | Performed by: STUDENT IN AN ORGANIZED HEALTH CARE EDUCATION/TRAINING PROGRAM

## 2023-07-14 PROCEDURE — 6360000002 HC RX W HCPCS: Performed by: STUDENT IN AN ORGANIZED HEALTH CARE EDUCATION/TRAINING PROGRAM

## 2023-07-14 PROCEDURE — 87077 CULTURE AEROBIC IDENTIFY: CPT

## 2023-07-14 PROCEDURE — 02HV33Z INSERTION OF INFUSION DEVICE INTO SUPERIOR VENA CAVA, PERCUTANEOUS APPROACH: ICD-10-PCS | Performed by: SURGERY

## 2023-07-14 PROCEDURE — 2580000003 HC RX 258: Performed by: EMERGENCY MEDICINE

## 2023-07-14 PROCEDURE — 36415 COLL VENOUS BLD VENIPUNCTURE: CPT

## 2023-07-14 PROCEDURE — 93010 ELECTROCARDIOGRAM REPORT: CPT | Performed by: INTERNAL MEDICINE

## 2023-07-14 PROCEDURE — 87070 CULTURE OTHR SPECIMN AEROBIC: CPT

## 2023-07-14 PROCEDURE — 80053 COMPREHEN METABOLIC PANEL: CPT

## 2023-07-14 PROCEDURE — 71045 X-RAY EXAM CHEST 1 VIEW: CPT

## 2023-07-14 PROCEDURE — 02PYX3Z REMOVAL OF INFUSION DEVICE FROM GREAT VESSEL, EXTERNAL APPROACH: ICD-10-PCS | Performed by: FAMILY MEDICINE

## 2023-07-14 PROCEDURE — 1100000000 HC RM PRIVATE

## 2023-07-14 PROCEDURE — 87147 CULTURE TYPE IMMUNOLOGIC: CPT

## 2023-07-14 PROCEDURE — 6360000002 HC RX W HCPCS: Performed by: EMERGENCY MEDICINE

## 2023-07-14 PROCEDURE — 82962 GLUCOSE BLOOD TEST: CPT

## 2023-07-14 PROCEDURE — 90935 HEMODIALYSIS ONE EVALUATION: CPT

## 2023-07-14 PROCEDURE — 96365 THER/PROPH/DIAG IV INF INIT: CPT

## 2023-07-14 PROCEDURE — 85025 COMPLETE CBC W/AUTO DIFF WBC: CPT

## 2023-07-14 PROCEDURE — 87040 BLOOD CULTURE FOR BACTERIA: CPT

## 2023-07-14 PROCEDURE — 87186 SC STD MICRODIL/AGAR DIL: CPT

## 2023-07-14 PROCEDURE — 93005 ELECTROCARDIOGRAM TRACING: CPT | Performed by: EMERGENCY MEDICINE

## 2023-07-14 PROCEDURE — 99285 EMERGENCY DEPT VISIT HI MDM: CPT

## 2023-07-14 PROCEDURE — 87205 SMEAR GRAM STAIN: CPT

## 2023-07-14 RX ORDER — SODIUM CHLORIDE 0.9 % (FLUSH) 0.9 %
5-40 SYRINGE (ML) INJECTION EVERY 12 HOURS SCHEDULED
Status: DISCONTINUED | OUTPATIENT
Start: 2023-07-14 | End: 2023-07-21 | Stop reason: HOSPADM

## 2023-07-14 RX ORDER — HEPARIN SODIUM 1000 [USP'U]/ML
2800 INJECTION, SOLUTION INTRAVENOUS; SUBCUTANEOUS PRN
Status: DISCONTINUED | OUTPATIENT
Start: 2023-07-14 | End: 2023-07-21 | Stop reason: HOSPADM

## 2023-07-14 RX ORDER — POLYETHYLENE GLYCOL 3350 17 G/17G
17 POWDER, FOR SOLUTION ORAL DAILY PRN
Status: DISCONTINUED | OUTPATIENT
Start: 2023-07-14 | End: 2023-07-21 | Stop reason: HOSPADM

## 2023-07-14 RX ORDER — ACETAMINOPHEN 650 MG/1
650 SUPPOSITORY RECTAL EVERY 6 HOURS PRN
Status: DISCONTINUED | OUTPATIENT
Start: 2023-07-14 | End: 2023-07-21 | Stop reason: HOSPADM

## 2023-07-14 RX ORDER — ACETAMINOPHEN 325 MG/1
650 TABLET ORAL EVERY 6 HOURS PRN
Status: DISCONTINUED | OUTPATIENT
Start: 2023-07-14 | End: 2023-07-21 | Stop reason: HOSPADM

## 2023-07-14 RX ORDER — AMLODIPINE BESYLATE 5 MG/1
10 TABLET ORAL DAILY
Status: DISCONTINUED | OUTPATIENT
Start: 2023-07-15 | End: 2023-07-21 | Stop reason: HOSPADM

## 2023-07-14 RX ORDER — SODIUM CHLORIDE 0.9 % (FLUSH) 0.9 %
5-40 SYRINGE (ML) INJECTION PRN
Status: DISCONTINUED | OUTPATIENT
Start: 2023-07-14 | End: 2023-07-21 | Stop reason: HOSPADM

## 2023-07-14 RX ORDER — SODIUM CHLORIDE 9 MG/ML
INJECTION, SOLUTION INTRAVENOUS PRN
Status: DISCONTINUED | OUTPATIENT
Start: 2023-07-14 | End: 2023-07-21 | Stop reason: HOSPADM

## 2023-07-14 RX ORDER — SEVELAMER CARBONATE 800 MG/1
800 TABLET, FILM COATED ORAL
Status: DISCONTINUED | OUTPATIENT
Start: 2023-07-14 | End: 2023-07-21 | Stop reason: HOSPADM

## 2023-07-14 RX ORDER — ONDANSETRON 4 MG/1
4 TABLET, ORALLY DISINTEGRATING ORAL EVERY 8 HOURS PRN
Status: DISCONTINUED | OUTPATIENT
Start: 2023-07-14 | End: 2023-07-21 | Stop reason: HOSPADM

## 2023-07-14 RX ORDER — ONDANSETRON 2 MG/ML
4 INJECTION INTRAMUSCULAR; INTRAVENOUS EVERY 6 HOURS PRN
Status: DISCONTINUED | OUTPATIENT
Start: 2023-07-14 | End: 2023-07-21 | Stop reason: HOSPADM

## 2023-07-14 RX ORDER — HYDRALAZINE HYDROCHLORIDE 20 MG/ML
10 INJECTION INTRAMUSCULAR; INTRAVENOUS EVERY 6 HOURS PRN
Status: DISCONTINUED | OUTPATIENT
Start: 2023-07-14 | End: 2023-07-21 | Stop reason: HOSPADM

## 2023-07-14 RX ORDER — CALCITRIOL 0.25 UG/1
0.25 CAPSULE, LIQUID FILLED ORAL DAILY
Status: DISCONTINUED | OUTPATIENT
Start: 2023-07-14 | End: 2023-07-21 | Stop reason: HOSPADM

## 2023-07-14 RX ORDER — HEPARIN SODIUM 5000 [USP'U]/ML
5000 INJECTION, SOLUTION INTRAVENOUS; SUBCUTANEOUS EVERY 8 HOURS SCHEDULED
Status: DISCONTINUED | OUTPATIENT
Start: 2023-07-14 | End: 2023-07-21 | Stop reason: HOSPADM

## 2023-07-14 RX ADMIN — HEPARIN SODIUM 2800 UNITS: 1000 INJECTION INTRAVENOUS; SUBCUTANEOUS at 22:45

## 2023-07-14 RX ADMIN — CEFEPIME 2000 MG: 2 INJECTION, POWDER, FOR SOLUTION INTRAVENOUS at 14:07

## 2023-07-14 ASSESSMENT — PAIN SCALES - GENERAL
PAINLEVEL_OUTOF10: 0

## 2023-07-14 NOTE — ED NOTES
Dialysis Nurse stated patient is scheduled to have hemodialysis at 1800 7/14/23  Requested an \"ok to use\" ordered to place for new line once established    Dr. Dae Hartmann  made aware via face to face communication      Orlin Roca RN  07/14/23 5435

## 2023-07-14 NOTE — PROGRESS NOTES
Per Armin Mcdowell @ [facility] - Hep B S Ag negative 6.23.23, Hep B S Ab negative 2.23.23.

## 2023-07-14 NOTE — PROGRESS NOTES
Spoke with ED RN. Pt will be admitted. Pt to have temporary CVC placed, current one is infected. We will dialyze patient later this evening.

## 2023-07-14 NOTE — PROGRESS NOTES
Ok to access right common femoral vein temporary hemodialysis catheter for hemodialysis, IV access, blood draws.

## 2023-07-14 NOTE — PROGRESS NOTES
Pharmacy Dosing Services:     Pharmacist Renal Dosing  Note for Daptomycin   Physician Dr. Dumont Presume    Indication: Bloodstream infection - MRSA  Previous Regimen Daptomycin 6mg/kg q48h   Serum Creatinine No results found for: YANET, CREA, CREAPOC   Creatinine Clearance Estimated Creatinine Clearance: 10 mL/min (A) (based on SCr of 8.37 mg/dL (H)).    BUN Lab Results   Component Value Date/Time    BUN 45 07/14/2023 01:43 PM       New Regimen: Daptomycin 6 mg/kg Monday, Wednesday, 9 mg/kg Friday      Lorie Pemberton, [address] [place] [address] , PharmD  [phone]

## 2023-07-14 NOTE — ED PROVIDER NOTES
EMERGENCY DEPARTMENT HISTORY AND PHYSICAL EXAM      Patient Name: Yael De Santiago  MRN: [id number]  YOB: 1970  Provider: Kandace Ford MD  PCP: Iván Gaffney MD   Time/Date of evaluation: 1:02 PM EDT on 7/14/23    History of Presenting Illness     Chief Complaint   Patient presents with    Vascular Access Problem       History Provided By: Patient     History Claire Bernard):   Yael De Santiago is a 48 y.o. male with a PMHX of ESRD on dialysis with a left anterior wall tunneled dialysis catheter  who presents to the emergency department  by POV C/O need for catheter change onset today. Per patient Wednesday when he went for dialysis there was concern for drainage around his catheter and blood work was sent. He reports when he went to dialysis today there was still concern for drainage and he was told to come to the hospital to change out the catheter and to complete his dialysis for today. He denies any pain around the catheter, fever, shortness of breath, vomiting, other complaints.         Past History     Past Medical History:  Past Medical History:   Diagnosis Date    Chronic kidney disease     Diabetes ([address])     Heart failure ([address])     Hemodialysis patient ([address])     Hypertension     Psychiatric illness 4/22/2023    Sickle cell trait [facility])        Past Surgical History:  Past Surgical History:   Procedure Laterality Date    IR NONTUNNELED VASCULAR CATHETER  6/1/2022    IR NONTUNNELED VASCULAR CATHETER 6/1/2022 [facility] RAD ANGIO IR    IR NONTUNNELED VASCULAR CATHETER  6/1/2022    IR NONTUNNELED VASCULAR CATHETER  2/8/2023    IR NONTUNNELED VASCULAR CATHETER [facility]    IR THRMB/INFUSION DIAYSIS CIRCUIT  6/3/2022    IR THRMB/INFUSION DIAYSIS CIRCUIT [facility]    IR THRMB/INFUSION DIAYSIS CIRCUIT  6/3/2022    IR TUNNELED CATHETER PLACEMENT GREATER THAN 5 YEARS  6/26/2022    IR TUNNELED CATHETER PLACEMENT GREATER THAN 5 YEARS [facility]    IR TUNNELED CATHETER

## 2023-07-14 NOTE — PROGRESS NOTES
Pharmacy - Cefepime renal dosing    Changed Cefepime 2 mg IV q12hrs to   Cefepime 1 gm IV q24hrs per renal dosing protocol  HD patient    Mirella Robertson.  Ammon Epperson  [facility]

## 2023-07-14 NOTE — PROCEDURES
Temporary hemodialysis catheter Placement Using Ultrasound Guidance    Date :  7/14/2023  Pre / Post Op Dx : esrd. Procedure : placement of right common femoral vein, 20cm, triple lumen, temporary hemodialysis catheter placement with ultrasound guidance. Surgeon : Lane Kapoor  Procedure Performed by :  Arlyn Schaefer  Anesthesia : 1% Lidocaine plain  EBL : Minimal   Complications : None   Description :   After explaining the risks and benefits of the procedure to the patient informed consent was obtained. The patient was properly identified and a time out was performed. The patient was then positioned supine. The area of the right groin was prepped and draped in sterile fashion. This area was then anesthetized with approximately 5 ml of 1% lidocaine. A SonEnsequence ultrasound was used to identify the right common femoral vein. Under direct ultrasound guidance, the right common femoral vein was accessed on the first stick via the Seldinger technique. A soft tipped J wire was inserted through the needle and advanced into the vein without difficulty . The needle was then removed. Using a #11 blade a small \"nick\" was made at the skin / wire interface. A tissue dilator was advanced over the guide wire through the subcutaneous tissue and then removed. The temporary hemodialysis catheter was then advanced into position over the guide wire and the wire was then removed. All three ports aspirated and flushed well. The catheter was then sutured in place and was dressed with a Tegaderm dressing. The patient tolerated the procedure well. There were no immediate complications noted.

## 2023-07-14 NOTE — ED TRIAGE NOTES
Pt sent from [facility]) for [facility] replacement (L chest). Per pt, current one is infected.  Denies systemic infection    Denies fever, chills, pain, or SOB      Last dialysis- Wednesday

## 2023-07-14 NOTE — ED NOTES
Vascular tech reported - Femoral Line in place and is available to use at this time       Viv Dalton, RN  07/14/23 Klaudia, RN  07/14/23 9147

## 2023-07-14 NOTE — ED NOTES
TRANSFER - OUT REPORT:    Verbal report given on Michelle Jaeger  being transferred to 21  for routine progression of patient care       Report consisted of patient's Situation, Background, Assessment and   Recommendations(SBAR). Information from the following report(s) ED SBAR was reviewed with the receiving nurse. Lines:   Peripheral IV 07/14/23 Right Antecubital (Active)       Tunneled Hemodialysis Catheter Left Subclavian (Active)   Site Assessment Draining 07/14/23 1335        Opportunity for questions and clarification was provided.       Patient transported with:            Saad Barraza RN  07/14/23 8695

## 2023-07-14 NOTE — H&P
nRBC 0.00 0.00 - 0.01 K/uL    Neutrophils % 76 (H) 40 - 73 %    Lymphocytes % 11 (L) 21 - 52 %    Monocytes % 10 3 - 10 %    Eosinophils % 3 0 - 5 %    Basophils % 1 0 - 2 %    Immature Granulocytes 1 (H) 0.0 - 0.5 %    Neutrophils Absolute 3.8 1.8 - 8.0 K/UL    Lymphocytes Absolute 0.5 (L) 0.9 - 3.6 K/UL    Monocytes Absolute 0.5 0.05 - 1.2 K/UL    Eosinophils Absolute 0.1 0.0 - 0.4 K/UL    Basophils Absolute 0.0 0.0 - 0.1 K/UL    Absolute Immature Granulocyte 0.0 0.00 - 0.04 K/UL    Differential Type AUTOMATED     CMP    Collection Time: 07/14/23  1:43 PM   Result Value Ref Range    Sodium 138 136 - 145 mmol/L    Potassium 4.4 3.5 - 5.5 mmol/L    Chloride 105 100 - 111 mmol/L    CO2 27 21 - 32 mmol/L    Anion Gap 6 3.0 - 18 mmol/L    Glucose 79 74 - 99 mg/dL    BUN 45 (H) 7.0 - 18 MG/DL    Creatinine 8.37 (H) 0.6 - 1.3 MG/DL    Bun/Cre Ratio 5 (L) 12 - 20      Est, Glom Filt Rate 7 (L) >60 ml/min/1.73m2    Calcium 8.6 8.5 - 10.1 MG/DL    Total Bilirubin 0.4 0.2 - 1.0 MG/DL    ALT 9 (L) 16 - 61 U/L    AST 10 10 - 38 U/L    Alk Phosphatase 88 45 - 117 U/L    Total Protein 8.2 6.4 - 8.2 g/dL    Albumin 3.3 (L) 3.4 - 5.0 g/dL    Globulin 4.9 (H) 2.0 - 4.0 g/dL    Albumin/Globulin Ratio 0.7 (L) 0.8 - 1.7     EKG 12 Lead    Collection Time: 07/14/23  1:53 PM   Result Value Ref Range    Ventricular Rate 97 BPM    Atrial Rate 97 BPM    P-R Interval 196 ms    QRS Duration 84 ms    Q-T Interval 392 ms    QTc Calculation (Bazett) 497 ms    P Axis 62 degrees    R Axis -16 degrees    T Axis 89 degrees    Diagnosis       Normal sinus rhythm  ST & T wave abnormality, consider lateral ischemia  Abnormal ECG  When compared with ECG of 08-MAY-2023 13:43,  No significant change was found  Confirmed by Diya Pierre MD, [facility] ([id number]) on 7/14/2023 5:41:20 PM     POCT Glucose    Collection Time: 07/14/23  1:54 PM   Result Value Ref Range    POC Glucose 83 70 - 110 mg/dL       Procedures/imaging: see electronic medical records for all

## 2023-07-14 NOTE — ED NOTES
Pt oxygen saturation 87% on room air. Placed pt on 3L O2, pts oxygen saturation pamella to 98%.       Todd Ramírez RN  07/14/23 0597

## 2023-07-15 LAB
ANION GAP SERPL CALC-SCNC: 5 MMOL/L (ref 3–18)
BASOPHILS # BLD: 0 K/UL (ref 0–0.1)
BASOPHILS NFR BLD: 1 % (ref 0–2)
BUN SERPL-MCNC: 20 MG/DL (ref 7–18)
BUN/CREAT SERPL: 4 (ref 12–20)
CALCIUM SERPL-MCNC: 8.1 MG/DL (ref 8.5–10.1)
CHLORIDE SERPL-SCNC: 102 MMOL/L (ref 100–111)
CO2 SERPL-SCNC: 31 MMOL/L (ref 21–32)
CREAT SERPL-MCNC: 4.88 MG/DL (ref 0.6–1.3)
DIFFERENTIAL METHOD BLD: ABNORMAL
EOSINOPHIL # BLD: 0.2 K/UL (ref 0–0.4)
EOSINOPHIL NFR BLD: 4 % (ref 0–5)
ERYTHROCYTE [DISTWIDTH] IN BLOOD BY AUTOMATED COUNT: 18.2 % (ref 11.6–14.5)
GLUCOSE BLD STRIP.AUTO-MCNC: 89 MG/DL (ref 70–110)
GLUCOSE BLD STRIP.AUTO-MCNC: 92 MG/DL (ref 70–110)
GLUCOSE BLD STRIP.AUTO-MCNC: 97 MG/DL (ref 70–110)
GLUCOSE SERPL-MCNC: 93 MG/DL (ref 74–99)
HBV SURFACE AG SER QL: <0.1 INDEX
HBV SURFACE AG SER QL: NEGATIVE
HCT VFR BLD AUTO: 29.9 % (ref 36–48)
HGB BLD-MCNC: 9.7 G/DL (ref 13–16)
IMM GRANULOCYTES # BLD AUTO: 0 K/UL (ref 0–0.04)
IMM GRANULOCYTES NFR BLD AUTO: 0 % (ref 0–0.5)
LYMPHOCYTES # BLD: 0.6 K/UL (ref 0.9–3.6)
LYMPHOCYTES NFR BLD: 11 % (ref 21–52)
MAGNESIUM SERPL-MCNC: 2.1 MG/DL (ref 1.6–2.6)
MCH RBC QN AUTO: 26.7 PG (ref 24–34)
MCHC RBC AUTO-ENTMCNC: 32.4 G/DL (ref 31–37)
MCV RBC AUTO: 82.4 FL (ref 78–100)
MONOCYTES # BLD: 0.6 K/UL (ref 0.05–1.2)
MONOCYTES NFR BLD: 12 % (ref 3–10)
NEUTS SEG # BLD: 3.7 K/UL (ref 1.8–8)
NEUTS SEG NFR BLD: 72 % (ref 40–73)
NRBC # BLD: 0 K/UL (ref 0–0.01)
NRBC BLD-RTO: 0 PER 100 WBC
PLATELET # BLD AUTO: 171 K/UL (ref 135–420)
PMV BLD AUTO: 10.4 FL (ref 9.2–11.8)
POTASSIUM SERPL-SCNC: 3.3 MMOL/L (ref 3.5–5.5)
RBC # BLD AUTO: 3.63 M/UL (ref 4.35–5.65)
SODIUM SERPL-SCNC: 138 MMOL/L (ref 136–145)
WBC # BLD AUTO: 5.1 K/UL (ref 4.6–13.2)

## 2023-07-15 PROCEDURE — 6360000002 HC RX W HCPCS: Performed by: FAMILY MEDICINE

## 2023-07-15 PROCEDURE — 82962 GLUCOSE BLOOD TEST: CPT

## 2023-07-15 PROCEDURE — 83735 ASSAY OF MAGNESIUM: CPT

## 2023-07-15 PROCEDURE — 2700000000 HC OXYGEN THERAPY PER DAY

## 2023-07-15 PROCEDURE — 36415 COLL VENOUS BLD VENIPUNCTURE: CPT

## 2023-07-15 PROCEDURE — A4216 STERILE WATER/SALINE, 10 ML: HCPCS | Performed by: INTERNAL MEDICINE

## 2023-07-15 PROCEDURE — 6360000002 HC RX W HCPCS: Performed by: INTERNAL MEDICINE

## 2023-07-15 PROCEDURE — 1100000000 HC RM PRIVATE

## 2023-07-15 PROCEDURE — 80048 BASIC METABOLIC PNL TOTAL CA: CPT

## 2023-07-15 PROCEDURE — 2580000003 HC RX 258: Performed by: INTERNAL MEDICINE

## 2023-07-15 PROCEDURE — 2580000003 HC RX 258: Performed by: FAMILY MEDICINE

## 2023-07-15 PROCEDURE — 6370000000 HC RX 637 (ALT 250 FOR IP): Performed by: STUDENT IN AN ORGANIZED HEALTH CARE EDUCATION/TRAINING PROGRAM

## 2023-07-15 PROCEDURE — 85025 COMPLETE CBC W/AUTO DIFF WBC: CPT

## 2023-07-15 RX ORDER — LABETALOL 100 MG/1
100 TABLET, FILM COATED ORAL EVERY 12 HOURS SCHEDULED
Status: DISCONTINUED | OUTPATIENT
Start: 2023-07-15 | End: 2023-07-19

## 2023-07-15 RX ORDER — LOSARTAN POTASSIUM 50 MG/1
100 TABLET ORAL DAILY
Status: DISCONTINUED | OUTPATIENT
Start: 2023-07-15 | End: 2023-07-21 | Stop reason: HOSPADM

## 2023-07-15 RX ADMIN — HEPARIN SODIUM 5000 UNITS: 5000 INJECTION INTRAVENOUS; SUBCUTANEOUS at 17:46

## 2023-07-15 RX ADMIN — HEPARIN SODIUM 5000 UNITS: 5000 INJECTION INTRAVENOUS; SUBCUTANEOUS at 09:20

## 2023-07-15 RX ADMIN — HYDRALAZINE HYDROCHLORIDE 10 MG: 20 INJECTION INTRAMUSCULAR; INTRAVENOUS at 00:30

## 2023-07-15 RX ADMIN — AMLODIPINE BESYLATE 10 MG: 5 TABLET ORAL at 09:22

## 2023-07-15 RX ADMIN — DAPTOMYCIN 700 MG: 500 INJECTION, POWDER, LYOPHILIZED, FOR SOLUTION INTRAVENOUS at 00:36

## 2023-07-15 RX ADMIN — MEROPENEM 1000 MG: 1 INJECTION, POWDER, FOR SOLUTION INTRAVENOUS at 00:38

## 2023-07-15 RX ADMIN — HYDRALAZINE HYDROCHLORIDE 10 MG: 20 INJECTION INTRAMUSCULAR; INTRAVENOUS at 21:19

## 2023-07-15 RX ADMIN — SODIUM CHLORIDE, PRESERVATIVE FREE 10 ML: 5 INJECTION INTRAVENOUS at 09:22

## 2023-07-15 RX ADMIN — CEFAZOLIN 1000 MG: 1 INJECTION, POWDER, FOR SOLUTION INTRAMUSCULAR; INTRAVENOUS at 18:24

## 2023-07-15 RX ADMIN — HEPARIN SODIUM 5000 UNITS: 5000 INJECTION INTRAVENOUS; SUBCUTANEOUS at 00:40

## 2023-07-15 RX ADMIN — SEVELAMER CARBONATE 800 MG: 800 TABLET, FILM COATED ORAL at 17:45

## 2023-07-15 RX ADMIN — LABETALOL HYDROCHLORIDE 100 MG: 100 TABLET, FILM COATED ORAL at 21:19

## 2023-07-15 RX ADMIN — LOSARTAN POTASSIUM 100 MG: 50 TABLET, FILM COATED ORAL at 13:25

## 2023-07-15 RX ADMIN — SODIUM CHLORIDE, PRESERVATIVE FREE 10 ML: 5 INJECTION INTRAVENOUS at 00:41

## 2023-07-15 RX ADMIN — CALCITRIOL 0.25 MCG: 0.25 CAPSULE ORAL at 09:21

## 2023-07-15 NOTE — PROGRESS NOTES
Case Management Assessment  Initial Evaluation    Date/Time of Evaluation: 7/15/2023 1315  Assessment Completed by: April Lee RN    If patient is discharged prior to next notation, then this note serves as note for discharge by case management. Patient Name: Ty Miller                   YOB: 1970  Diagnosis: ESRD (end stage renal disease) on dialysis ([address]) [N18.6, Z99.2]  Hemodialysis catheter malfunction, initial encounter ([address]) Haroon Zepeda  At risk for infection at catheter site [Z91.89]                   Date / Time: 7/14/2023 12:49 PM    Patient Admission Status: Inpatient   Readmission Risk (Low < 19, Mod (19-27), High > 27): Readmission Risk Score: 27.5    Current PCP: Imani Thurman MD  PCP verified by CM? Yes    Chart Reviewed: Yes      History Provided by: Patient  Patient Orientation: Alert and Oriented    Patient Cognition: Alert    Hospitalization in the last 30 days (Readmission):  No    If yes, Readmission Assessment in CM Navigator will be completed. Advance Directives:      Code Status: Full Code   Patient's Primary Decision Maker is: Legal Next of Kin    Primary Decision Maker: Raudel Hernandez - Brother/Sister - [phone]    Secondary Decision Maker: Rima Preston - Ex-Spouse - [phone]    Discharge Planning:    Patient lives with: Alone Type of Home: Apartment  Primary Care Giver: Self  Patient Support Systems include: Children, Family Members   Current Financial resources: Medicare  Current community resources:    Current services prior to admission: Durable Medical Equipment, Other (Comment) (putpt. HD at [facility] MWF @4025)            Current DME: Valetta Age, Other (Comment) (prosthetic leg and walker)            Type of Home Care services:       ADLS  Prior functional level: Independent in ADLs/IADLs  Current functional level: Independent in ADLs/IADLs    PT AM-PAC:   /24  OT AM-PAC:   /24    Family can provide assistance at DC:  Yes  Would you

## 2023-07-15 NOTE — PROGRESS NOTES
Brief Afar/ID MICRO Note    Chart reviewed  BCx still sterile (still a little early yet), but wound growing Staphylococcus aureus for which we should know MRSA vs MSSA later this weekend. Will switch off the broad/expensive meropenem for renal-dosed cefazolin and continue the daptomycin for now (in case MRSA).     Mralyn Arvizu MD  Cell [phone] [address] [facility]

## 2023-07-15 NOTE — PLAN OF CARE
Problem: Chronic Conditions and Co-morbidities  Goal: Patient's chronic conditions and co-morbidity symptoms are monitored and maintained or improved  Outcome: Progressing  Flowsheets (Taken 7/14/2023 2056)  Care Plan - Patient's Chronic Conditions and Co-Morbidity Symptoms are Monitored and Maintained or Improved:   Monitor and assess patient's chronic conditions and comorbid symptoms for stability, deterioration, or improvement   Collaborate with multidisciplinary team to address chronic and comorbid conditions and prevent exacerbation or deterioration   Update acute care plan with appropriate goals if chronic or comorbid symptoms are exacerbated and prevent overall improvement and discharge  Note: HD X3.5hrs, 3L UF goal.

## 2023-07-15 NOTE — PROGRESS NOTES
Hospitalist Progress Note    Patient: Simone Padron MRN: [id number]  [facility]: [id number]    YOB: 1970  Age: 48 y.o. Sex: male    DOA: 7/14/2023 LOS:  LOS: 1 day          Chief Complaint:    Left chest wall tunneled catheter site infection    Assessment/Plan     Simone Padron is a 48 y.o. male with a past medical h/o of ESRD on dialysis with a left anterior wall tunneled dialysis catheter, a very notable history of medical noncompliance with dialysis, uncontrolled hypertension in the past has been admitted with hypertensive urgency, chronic anemia of kidney disease, peripheral vascular disease and status post right below-knee amputation who presents to the emergency department with a complaint of a tunneled catheter malfunction. Left chest wall tunneled catheter site infection -  Appreciate vascular surgery consult   Appreciate ID consult  Catheter removed   S/p right common femoral vein temporary hemodialysis catheter placement  IV meropenem plus daptomycin  Follow blood cultures and wound cultures     Involuntary movements/myoclonus -  Caused by cefepime, normal drug effect for patients with end-stage renal disease      Uncontrolled hypertension -  Uncontrolled blood pressure likely due to longstanding medical noncompliance  Better this AM   Nephrology assisting      Anemia of chronic kidney disease -  Epogen given per nephrology  Monitor H&H and will transfuse if hemoglobin drops below 7     End-stage renal disease on hemodialysis Monday Wednesday Friday -  Management deferred to nephrology     History of hyperphosphatemia -  Resume sevelamer     History of hypocalcemia -  calcitriol and monitor calcium level     History of secondary hyperparathyroidism -  sevelamer and calcitriol     Longstanding chronic medical noncompliance -  Advised compliance and lifestyle modification     Active Hospital Problems    Diagnosis Date Noted    Infection of hemodialysis tunneled catheter ([address]) Olena Duran. 7XXA]

## 2023-07-16 LAB
ANION GAP SERPL CALC-SCNC: 9 MMOL/L (ref 3–18)
BASOPHILS # BLD: 0 K/UL (ref 0–0.1)
BASOPHILS NFR BLD: 1 % (ref 0–2)
BUN SERPL-MCNC: 34 MG/DL (ref 7–18)
BUN/CREAT SERPL: 5 (ref 12–20)
CALCIUM SERPL-MCNC: 8.4 MG/DL (ref 8.5–10.1)
CHLORIDE SERPL-SCNC: 101 MMOL/L (ref 100–111)
CO2 SERPL-SCNC: 29 MMOL/L (ref 21–32)
CREAT SERPL-MCNC: 7.32 MG/DL (ref 0.6–1.3)
DIFFERENTIAL METHOD BLD: ABNORMAL
EOSINOPHIL # BLD: 0.3 K/UL (ref 0–0.4)
EOSINOPHIL NFR BLD: 6 % (ref 0–5)
ERYTHROCYTE [DISTWIDTH] IN BLOOD BY AUTOMATED COUNT: 18.8 % (ref 11.6–14.5)
GLUCOSE SERPL-MCNC: 86 MG/DL (ref 74–99)
HCT VFR BLD AUTO: 33.7 % (ref 36–48)
HGB BLD-MCNC: 11 G/DL (ref 13–16)
IMM GRANULOCYTES # BLD AUTO: 0 K/UL (ref 0–0.04)
IMM GRANULOCYTES NFR BLD AUTO: 0 % (ref 0–0.5)
LYMPHOCYTES # BLD: 0.7 K/UL (ref 0.9–3.6)
LYMPHOCYTES NFR BLD: 15 % (ref 21–52)
MCH RBC QN AUTO: 27.4 PG (ref 24–34)
MCHC RBC AUTO-ENTMCNC: 32.6 G/DL (ref 31–37)
MCV RBC AUTO: 83.8 FL (ref 78–100)
MONOCYTES # BLD: 0.6 K/UL (ref 0.05–1.2)
MONOCYTES NFR BLD: 11 % (ref 3–10)
NEUTS SEG # BLD: 3.4 K/UL (ref 1.8–8)
NEUTS SEG NFR BLD: 67 % (ref 40–73)
NRBC # BLD: 0 K/UL (ref 0–0.01)
NRBC BLD-RTO: 0 PER 100 WBC
PLATELET # BLD AUTO: 188 K/UL (ref 135–420)
PMV BLD AUTO: 9.8 FL (ref 9.2–11.8)
POTASSIUM SERPL-SCNC: 4.1 MMOL/L (ref 3.5–5.5)
RBC # BLD AUTO: 4.02 M/UL (ref 4.35–5.65)
SODIUM SERPL-SCNC: 139 MMOL/L (ref 136–145)
WBC # BLD AUTO: 5.1 K/UL (ref 4.6–13.2)

## 2023-07-16 PROCEDURE — 6360000002 HC RX W HCPCS: Performed by: INTERNAL MEDICINE

## 2023-07-16 PROCEDURE — 80048 BASIC METABOLIC PNL TOTAL CA: CPT

## 2023-07-16 PROCEDURE — 2580000003 HC RX 258: Performed by: FAMILY MEDICINE

## 2023-07-16 PROCEDURE — 2580000003 HC RX 258: Performed by: INTERNAL MEDICINE

## 2023-07-16 PROCEDURE — 6360000002 HC RX W HCPCS: Performed by: FAMILY MEDICINE

## 2023-07-16 PROCEDURE — 36415 COLL VENOUS BLD VENIPUNCTURE: CPT

## 2023-07-16 PROCEDURE — 6370000000 HC RX 637 (ALT 250 FOR IP): Performed by: STUDENT IN AN ORGANIZED HEALTH CARE EDUCATION/TRAINING PROGRAM

## 2023-07-16 PROCEDURE — 1100000000 HC RM PRIVATE

## 2023-07-16 PROCEDURE — 2700000000 HC OXYGEN THERAPY PER DAY

## 2023-07-16 PROCEDURE — 85025 COMPLETE CBC W/AUTO DIFF WBC: CPT

## 2023-07-16 RX ADMIN — HEPARIN SODIUM 5000 UNITS: 5000 INJECTION INTRAVENOUS; SUBCUTANEOUS at 18:13

## 2023-07-16 RX ADMIN — CEFAZOLIN 1000 MG: 1 INJECTION, POWDER, FOR SOLUTION INTRAMUSCULAR; INTRAVENOUS at 18:13

## 2023-07-16 RX ADMIN — SODIUM CHLORIDE, PRESERVATIVE FREE 10 ML: 5 INJECTION INTRAVENOUS at 23:38

## 2023-07-16 RX ADMIN — LABETALOL HYDROCHLORIDE 100 MG: 100 TABLET, FILM COATED ORAL at 23:30

## 2023-07-16 RX ADMIN — SEVELAMER CARBONATE 800 MG: 800 TABLET, FILM COATED ORAL at 12:11

## 2023-07-16 RX ADMIN — LABETALOL HYDROCHLORIDE 100 MG: 100 TABLET, FILM COATED ORAL at 08:47

## 2023-07-16 RX ADMIN — CALCITRIOL 0.25 MCG: 0.25 CAPSULE ORAL at 08:47

## 2023-07-16 RX ADMIN — AMLODIPINE BESYLATE 10 MG: 5 TABLET ORAL at 08:47

## 2023-07-16 RX ADMIN — HYDRALAZINE HYDROCHLORIDE 10 MG: 20 INJECTION INTRAMUSCULAR; INTRAVENOUS at 12:22

## 2023-07-16 RX ADMIN — LOSARTAN POTASSIUM 100 MG: 50 TABLET, FILM COATED ORAL at 08:47

## 2023-07-16 RX ADMIN — HEPARIN SODIUM 5000 UNITS: 5000 INJECTION INTRAVENOUS; SUBCUTANEOUS at 08:51

## 2023-07-16 RX ADMIN — HEPARIN SODIUM 5000 UNITS: 5000 INJECTION INTRAVENOUS; SUBCUTANEOUS at 23:30

## 2023-07-16 RX ADMIN — SODIUM CHLORIDE, PRESERVATIVE FREE 10 ML: 5 INJECTION INTRAVENOUS at 12:12

## 2023-07-16 NOTE — PLAN OF CARE
1920 Bedside and Verbal shift change report given to Cornelio Heredia, RN (oncoming nurse) by Lily Noe, RN (offgoing nurse). Report included the following information Nurse Handoff Report, Index, Adult Overview, Intake/Output, MAR, and Recent Results. Pt resting with eyes closed. Head to toe to follow. 2120 Pt presents with flat affect, drifts on and off sleep during assessment, oriented x4, NAD. Denies pain. BP elevated, IV hydralazine given per MAR. Lungs are clear bilaterally in 1.5L NC, HOB >30 degrees. Active bowel sounds in all quadrants, abdomen soft and non-tender. R BKA present CDI, prosthetic at bedside. Bed in lowest position, +alarm, call bell and belongings within reach. Encouraged pt to call if needing any assistance. 46 Paged Dr. Ha Overcast via perfect serve regarding pt's sustaining high BP despite PRN med given. Awaiting response. Pt denies headache/CP. 0630 No change in pt status, no new orders from nocturnist.    0720 Bedside and Verbal shift change report given to Lily Noe, RN (oncoming nurse) by Cornelio Heredia, RN (offgoing nurse). Report included the following information Nurse Handoff Report, Index, Adult Overview, Intake/Output, MAR, and Recent Results. This RN is signing off.

## 2023-07-16 NOTE — PLAN OF CARE
Problem: Chronic Conditions and Co-morbidities  Goal: Patient's chronic conditions and co-morbidity symptoms are monitored and maintained or improved  7/15/2023 2237 by Maritza Solano RN  Outcome: Progressing  Flowsheets (Taken 7/15/2023 2120)  Care Plan - Patient's Chronic Conditions and Co-Morbidity Symptoms are Monitored and Maintained or Improved:   Monitor and assess patient's chronic conditions and comorbid symptoms for stability, deterioration, or improvement   Collaborate with multidisciplinary team to address chronic and comorbid conditions and prevent exacerbation or deterioration   Update acute care plan with appropriate goals if chronic or comorbid symptoms are exacerbated and prevent overall improvement and discharge    Problem: Pain  Goal: Verbalizes/displays adequate comfort level or baseline comfort level  7/15/2023 2237 by Maritza Solano RN  Outcome: Progressing  Flowsheets (Taken 7/15/2023 2120)  Verbalizes/displays adequate comfort level or baseline comfort level:   Assess pain using appropriate pain scale   Administer analgesics based on type and severity of pain and evaluate response   Implement non-pharmacological measures as appropriate and evaluate response   Consider cultural and social influences on pain and pain management     Problem: Safety - Adult  Goal: Free from fall injury  7/15/2023 2237 by Maritza Solano RN  Outcome: Progressing  Flowsheets (Taken 7/15/2023 2120)  Free From Fall Injury:   Instruct family/caregiver on patient safety   Based on caregiver fall risk screen, instruct family/caregiver to ask for assistance with transferring infant if caregiver noted to have fall risk factors     Problem: Skin/Tissue Integrity  Goal: Absence of new skin breakdown  Description: 1. Monitor for areas of redness and/or skin breakdown  2. Assess vascular access sites hourly  3. Every 4-6 hours minimum:  Change oxygen saturation probe site  4.   Every 4-6 hours:  If on nasal

## 2023-07-17 LAB
ANION GAP SERPL CALC-SCNC: 10 MMOL/L (ref 3–18)
BACTERIA SPEC CULT: ABNORMAL
BASOPHILS # BLD: 0 K/UL (ref 0–0.1)
BASOPHILS NFR BLD: 1 % (ref 0–2)
BUN SERPL-MCNC: 47 MG/DL (ref 7–18)
BUN/CREAT SERPL: 5 (ref 12–20)
CALCIUM SERPL-MCNC: 8.5 MG/DL (ref 8.5–10.1)
CHLORIDE SERPL-SCNC: 101 MMOL/L (ref 100–111)
CO2 SERPL-SCNC: 28 MMOL/L (ref 21–32)
CREAT SERPL-MCNC: 9.2 MG/DL (ref 0.6–1.3)
DIFFERENTIAL METHOD BLD: ABNORMAL
EOSINOPHIL # BLD: 0.3 K/UL (ref 0–0.4)
EOSINOPHIL NFR BLD: 5 % (ref 0–5)
ERYTHROCYTE [DISTWIDTH] IN BLOOD BY AUTOMATED COUNT: 18.9 % (ref 11.6–14.5)
GLUCOSE SERPL-MCNC: 76 MG/DL (ref 74–99)
GRAM STN SPEC: ABNORMAL
GRAM STN SPEC: ABNORMAL
HBV SURFACE AB SER QL IA: POSITIVE
HBV SURFACE AB SERPL IA-ACNC: 738.18 MIU/ML
HCT VFR BLD AUTO: 33.6 % (ref 36–48)
HGB BLD-MCNC: 11 G/DL (ref 13–16)
IMM GRANULOCYTES # BLD AUTO: 0 K/UL (ref 0–0.04)
IMM GRANULOCYTES NFR BLD AUTO: 0 % (ref 0–0.5)
LYMPHOCYTES # BLD: 0.8 K/UL (ref 0.9–3.6)
LYMPHOCYTES NFR BLD: 15 % (ref 21–52)
Lab: NORMAL
MCH RBC QN AUTO: 27.6 PG (ref 24–34)
MCHC RBC AUTO-ENTMCNC: 32.7 G/DL (ref 31–37)
MCV RBC AUTO: 84.2 FL (ref 78–100)
MONOCYTES # BLD: 0.6 K/UL (ref 0.05–1.2)
MONOCYTES NFR BLD: 10 % (ref 3–10)
NEUTS SEG # BLD: 3.7 K/UL (ref 1.8–8)
NEUTS SEG NFR BLD: 69 % (ref 40–73)
NRBC # BLD: 0 K/UL (ref 0–0.01)
NRBC BLD-RTO: 0 PER 100 WBC
PLATELET # BLD AUTO: 210 K/UL (ref 135–420)
PMV BLD AUTO: 10.3 FL (ref 9.2–11.8)
POTASSIUM SERPL-SCNC: 4.2 MMOL/L (ref 3.5–5.5)
RBC # BLD AUTO: 3.99 M/UL (ref 4.35–5.65)
SERVICE CMNT-IMP: ABNORMAL
SODIUM SERPL-SCNC: 139 MMOL/L (ref 136–145)
WBC # BLD AUTO: 5.4 K/UL (ref 4.6–13.2)

## 2023-07-17 PROCEDURE — 2580000003 HC RX 258: Performed by: INTERNAL MEDICINE

## 2023-07-17 PROCEDURE — 1100000000 HC RM PRIVATE

## 2023-07-17 PROCEDURE — 2580000003 HC RX 258: Performed by: FAMILY MEDICINE

## 2023-07-17 PROCEDURE — 6360000002 HC RX W HCPCS: Performed by: FAMILY MEDICINE

## 2023-07-17 PROCEDURE — 80048 BASIC METABOLIC PNL TOTAL CA: CPT

## 2023-07-17 PROCEDURE — 2700000000 HC OXYGEN THERAPY PER DAY

## 2023-07-17 PROCEDURE — 6370000000 HC RX 637 (ALT 250 FOR IP): Performed by: STUDENT IN AN ORGANIZED HEALTH CARE EDUCATION/TRAINING PROGRAM

## 2023-07-17 PROCEDURE — 85025 COMPLETE CBC W/AUTO DIFF WBC: CPT

## 2023-07-17 PROCEDURE — 6360000002 HC RX W HCPCS: Performed by: INTERNAL MEDICINE

## 2023-07-17 PROCEDURE — 36415 COLL VENOUS BLD VENIPUNCTURE: CPT

## 2023-07-17 PROCEDURE — 90935 HEMODIALYSIS ONE EVALUATION: CPT

## 2023-07-17 PROCEDURE — 6360000002 HC RX W HCPCS: Performed by: STUDENT IN AN ORGANIZED HEALTH CARE EDUCATION/TRAINING PROGRAM

## 2023-07-17 RX ADMIN — LABETALOL HYDROCHLORIDE 100 MG: 100 TABLET, FILM COATED ORAL at 20:42

## 2023-07-17 RX ADMIN — HEPARIN SODIUM 2800 UNITS: 1000 INJECTION INTRAVENOUS; SUBCUTANEOUS at 14:52

## 2023-07-17 RX ADMIN — HEPARIN SODIUM 5000 UNITS: 5000 INJECTION INTRAVENOUS; SUBCUTANEOUS at 08:38

## 2023-07-17 RX ADMIN — AMLODIPINE BESYLATE 10 MG: 5 TABLET ORAL at 08:39

## 2023-07-17 RX ADMIN — SODIUM CHLORIDE, PRESERVATIVE FREE 10 ML: 5 INJECTION INTRAVENOUS at 08:51

## 2023-07-17 RX ADMIN — SEVELAMER CARBONATE 800 MG: 800 TABLET, FILM COATED ORAL at 18:20

## 2023-07-17 RX ADMIN — HYDRALAZINE HYDROCHLORIDE 10 MG: 20 INJECTION INTRAMUSCULAR; INTRAVENOUS at 08:38

## 2023-07-17 RX ADMIN — HEPARIN SODIUM 5000 UNITS: 5000 INJECTION INTRAVENOUS; SUBCUTANEOUS at 18:20

## 2023-07-17 RX ADMIN — LABETALOL HYDROCHLORIDE 100 MG: 100 TABLET, FILM COATED ORAL at 08:39

## 2023-07-17 RX ADMIN — CALCITRIOL 0.25 MCG: 0.25 CAPSULE ORAL at 08:39

## 2023-07-17 RX ADMIN — SODIUM CHLORIDE, PRESERVATIVE FREE 10 ML: 5 INJECTION INTRAVENOUS at 20:43

## 2023-07-17 RX ADMIN — LOSARTAN POTASSIUM 100 MG: 50 TABLET, FILM COATED ORAL at 08:39

## 2023-07-17 RX ADMIN — HYDRALAZINE HYDROCHLORIDE 10 MG: 20 INJECTION INTRAMUSCULAR; INTRAVENOUS at 20:42

## 2023-07-17 RX ADMIN — CEFAZOLIN 1000 MG: 1 INJECTION, POWDER, FOR SOLUTION INTRAMUSCULAR; INTRAVENOUS at 18:19

## 2023-07-17 ASSESSMENT — PAIN SCALES - GENERAL: PAINLEVEL_OUTOF10: 0

## 2023-07-17 NOTE — PROGRESS NOTES
Bhupendra dressing changed by [facility] and this RN and Gato Echevarria assisted. Sterile procedure followed.   Nasal cannula in place,

## 2023-07-17 NOTE — PROGRESS NOTES
Hospitalist Progress Note    Patient: David Allen MRN: [id number]  [facility]: [id number]    YOB: 1970  Age: 48 y.o. Sex: male    DOA: 7/14/2023 LOS:  LOS: 3 days          Chief Complaint:    Left chest wall tunneled catheter site infection    Assessment/Plan     David Allen is a 48 y.o. male with a past medical h/o of ESRD on dialysis with a left anterior wall tunneled dialysis catheter, a very notable history of medical noncompliance with dialysis, uncontrolled hypertension in the past has been admitted with hypertensive urgency, chronic anemia of kidney disease, peripheral vascular disease and status post right below-knee amputation who presents to the emergency department with a complaint of a tunneled catheter malfunction. Left chest wall tunneled catheter site infection -  MSSA infection   Ancef IV for total of 2 weeks per ID   Appreciate vascular surgery >TDC to be placed after negative BCx     Involuntary movements/myoclonus -  Caused by cefepime, normal drug effect for patients with end-stage renal disease      Uncontrolled hypertension -  Continued elevated BPs  Appreciate Nephrology assistance with BP control vs post-HD      Anemia of chronic kidney disease -  Epogen given per nephrology  Monitor H&H and will transfuse if hemoglobin drops below 7     End-stage renal disease on hemodialysis Monday Wednesday Friday -  Management deferred to nephrology     History of hyperphosphatemia -  Resume sevelamer     History of hypocalcemia -  calcitriol and monitor calcium level     History of secondary hyperparathyroidism -  sevelamer and calcitriol     Longstanding chronic medical noncompliance -  Advised compliance and lifestyle modification     Active Hospital Problems    Diagnosis Date Noted    Infection of hemodialysis tunneled catheter ([address]) Tania Lassiter. 7XXA] 07/14/2023    Involuntary jerky movements [R25.8] 07/14/2023    Hypertensive urgency [I16.0] 04/22/2023    Chronic anemia [D64.9]

## 2023-07-17 NOTE — PROGRESS NOTES
Hospitalist Progress Note    Patient: Jenny Marshall MRN: [id number]  [facility]: [id number]    YOB: 1970  Age: 48 y.o. Sex: male    DOA: 7/14/2023 LOS:  LOS: 2 days          Chief Complaint:    Left chest wall tunneled catheter site infection    Assessment/Plan     Jenny Marshall is a 48 y.o. male with a past medical h/o of ESRD on dialysis with a left anterior wall tunneled dialysis catheter, a very notable history of medical noncompliance with dialysis, uncontrolled hypertension in the past has been admitted with hypertensive urgency, chronic anemia of kidney disease, peripheral vascular disease and status post right below-knee amputation who presents to the emergency department with a complaint of a tunneled catheter malfunction.      Left chest wall tunneled catheter site infection -  Appreciate vascular surgery consult   Appreciate ID consult  Catheter removed   S/p right common femoral vein temporary hemodialysis catheter placement  IV meropenem plus daptomycin  Follow blood cultures and wound cultures   If blood culture neg, can placed [facility] later this week     Involuntary movements/myoclonus -  Caused by cefepime, normal drug effect for patients with end-stage renal disease      Uncontrolled hypertension -  Uncontrolled blood pressure likely due to longstanding medical noncompliance  Better this AM   Nephrology assisting and added amlodipne, labetalol and losartan for now     Anemia of chronic kidney disease -  Epogen given per nephrology  Monitor H&H and will transfuse if hemoglobin drops below 7     End-stage renal disease on hemodialysis Monday Wednesday Friday -  Management deferred to nephrology     History of hyperphosphatemia -  Resume sevelamer     History of hypocalcemia -  calcitriol and monitor calcium level     History of secondary hyperparathyroidism -  sevelamer and calcitriol     Longstanding chronic medical noncompliance -  Advised compliance and lifestyle modification     Active 11.0*   HCT 29.2* 29.9* 33.7*    171 188   LYMPHOPCT 11* 11* 15*        Cardiac Enzymes No results for input(s): CKTOTAL, CKMB, CKMBINDEX, TROPONINI, FLOYD in the last 72 hours. Coagulation No results for input(s): PROTIME, INR, APTT in the last 72 hours. Lipid Panel Lab Results   Component Value Date/Time    CHOL 115 06/02/2022 09:45 AM    TRIG 35 06/02/2022 09:45 AM    HDL 62 06/02/2022 09:45 AM    LDLCALC 46 06/02/2022 09:45 AM    LABVLDL 7 06/02/2022 09:45 AM    CHOLHDLRATIO 1.9 06/02/2022 09:45 AM      BNP No results for input(s): BNP in the last 72 hours. Liver Enzymes Recent Labs     07/14/23  1343   ALT 9*   AST 10   ALKPHOS 88   BILITOT 0.4        Thyroid Studies Lab Results   Component Value Date/Time    TSH 2.38 08/29/2021 01:20 AM          Procedures/imaging: see electronic medical records for all procedures/Xrays and details which were not copied into this note but were reviewed prior to creation of Plan      Denny Pereira MD    TIME: E/M Time spent with patient and patient care issues: [] 15-20 min  [x] 21-30 min  [] 31-44 min  [] 45 min or more. This time also includes physician non-face-to-face service time visit on the date of service such as  Preparing to see the patient (eg, review of tests)  Obtaining and/or reviewing separately obtained history  Performing a medically necessary appropriate examination and/or evaluation  Counseling and educating the patient/family/caregiver  Ordering medications, tests, or procedures  Referring and communicating with other health care professionals as needed  Documenting clinical information in the electronic or other health record  Independently interpreting results (not reported separately) and communicating results to the patient/family/caregiver  Care coordination and discharge planning with Case Management.

## 2023-07-17 NOTE — DIALYSIS
TREATMENT SUMMARY     Patient dialyzed in room 326  Tolerated with asymptomatic hypotension  down to   Access functioning well without complication of BFR or accessing   BFR  350-400  DFR  800  2500 ml removed via UF with a with a net removal 2000 ml  Report given to Robert Carrizales RN with all questions answered     TREATMENT NOTES       1254 /74 down from 215/98 at the start of of HD. Patient is sitting up in bed conversing in full sentences, eating ice, denies N/V/D, CP, SOB or any acknowledgement of general un wellness. UF/ fluid removal suspended about 1.5 hours into HD.

## 2023-07-18 PROCEDURE — 6370000000 HC RX 637 (ALT 250 FOR IP): Performed by: STUDENT IN AN ORGANIZED HEALTH CARE EDUCATION/TRAINING PROGRAM

## 2023-07-18 PROCEDURE — 6360000002 HC RX W HCPCS: Performed by: INTERNAL MEDICINE

## 2023-07-18 PROCEDURE — 2580000003 HC RX 258: Performed by: FAMILY MEDICINE

## 2023-07-18 PROCEDURE — 1100000000 HC RM PRIVATE

## 2023-07-18 PROCEDURE — 6360000002 HC RX W HCPCS: Performed by: FAMILY MEDICINE

## 2023-07-18 PROCEDURE — 2700000000 HC OXYGEN THERAPY PER DAY

## 2023-07-18 PROCEDURE — 2580000003 HC RX 258: Performed by: INTERNAL MEDICINE

## 2023-07-18 RX ADMIN — SODIUM CHLORIDE, PRESERVATIVE FREE 10 ML: 5 INJECTION INTRAVENOUS at 08:42

## 2023-07-18 RX ADMIN — LOSARTAN POTASSIUM 100 MG: 50 TABLET, FILM COATED ORAL at 08:41

## 2023-07-18 RX ADMIN — LABETALOL HYDROCHLORIDE 100 MG: 100 TABLET, FILM COATED ORAL at 08:41

## 2023-07-18 RX ADMIN — HEPARIN SODIUM 5000 UNITS: 5000 INJECTION INTRAVENOUS; SUBCUTANEOUS at 08:41

## 2023-07-18 RX ADMIN — HYDRALAZINE HYDROCHLORIDE 10 MG: 20 INJECTION INTRAMUSCULAR; INTRAVENOUS at 22:17

## 2023-07-18 RX ADMIN — SEVELAMER CARBONATE 800 MG: 800 TABLET, FILM COATED ORAL at 17:36

## 2023-07-18 RX ADMIN — HEPARIN SODIUM 5000 UNITS: 5000 INJECTION INTRAVENOUS; SUBCUTANEOUS at 17:36

## 2023-07-18 RX ADMIN — CEFAZOLIN 1000 MG: 1 INJECTION, POWDER, FOR SOLUTION INTRAMUSCULAR; INTRAVENOUS at 17:36

## 2023-07-18 RX ADMIN — HEPARIN SODIUM 5000 UNITS: 5000 INJECTION INTRAVENOUS; SUBCUTANEOUS at 01:30

## 2023-07-18 RX ADMIN — SODIUM CHLORIDE, PRESERVATIVE FREE 10 ML: 5 INJECTION INTRAVENOUS at 22:21

## 2023-07-18 RX ADMIN — AMLODIPINE BESYLATE 10 MG: 5 TABLET ORAL at 08:41

## 2023-07-18 RX ADMIN — CALCITRIOL 0.25 MCG: 0.25 CAPSULE ORAL at 08:41

## 2023-07-18 RX ADMIN — SEVELAMER CARBONATE 800 MG: 800 TABLET, FILM COATED ORAL at 11:40

## 2023-07-18 RX ADMIN — HYDRALAZINE HYDROCHLORIDE 10 MG: 20 INJECTION INTRAMUSCULAR; INTRAVENOUS at 17:45

## 2023-07-18 RX ADMIN — LABETALOL HYDROCHLORIDE 100 MG: 100 TABLET, FILM COATED ORAL at 22:17

## 2023-07-18 NOTE — PROGRESS NOTES
Hospitalist Progress Note    Patient: Jeremy Parker MRN: [id number]  [facility]: [id number]    YOB: 1970  Age: 48 y.o. Sex: male    DOA: 7/14/2023 LOS:  LOS: 4 days          Chief Complaint:    Infected catheter      Assessment/Plan        Jeremy Parker is a 48 y.o. male with a past medical h/o of ESRD on dialysis with a left anterior wall tunneled dialysis catheter, a very notable history of medical noncompliance with dialysis, uncontrolled hypertension in the past has been admitted with hypertensive urgency, chronic anemia of kidney disease, peripheral vascular disease and status post right below-knee amputation who presents to the emergency department with a complaint of a tunneled catheter malfunction. Left chest wall tunneled catheter site infection -  MSSA infection   Ancef IV for total of 2 weeks per ID   Appreciate vascular surgery >TDC to be placed after negative Bcx, tomorrow 7/19     Involuntary movements/myoclonus -resolved  Caused by cefepime, normal drug effect for patients with end-stage renal disease      Uncontrolled hypertension -  Appreciate Nephrology assistance with BP control vs post-HD      Anemia of chronic kidney disease -  Epogen   Monitor H&H      End-stage renal disease on hemodialysis Monday Wednesday Friday      History of hypocalcemia -  calcitriol and monitor calcium level     secondary hyperparathyroidism -  sevelamer and calcitriol     Longstanding chronic medical noncompliance -  Advised compliance and lifestyle modification     Disposition :  Active Hospital Problems    Diagnosis     Infection of hemodialysis tunneled catheter ([address]) [T82. 7XXA]     Involuntary jerky movements [R25.8]     Hypertensive urgency [I16.0]     Chronic anemia [D64.9]     ESRD (end stage renal disease) on dialysis ([address]) [N18.6, Z99.2]        Subjective:  No complaints  Doing fine  Denies any fevers, myalgias, chills, pain      Review of systems:    Respiratory: denies SOB  Cardiovascular:

## 2023-07-18 NOTE — PLAN OF CARE
1940 Bedside and Verbal shift change report given to Low Jang RN (oncoming nurse) by China Reno RN (offgoing nurse). Report included the following information Nurse Handoff Report, Index, Adult Overview, Intake/Output, MAR, and Recent Results. Pt without immediate needs. Head to toe to follow. 2040 Pt is A&O x4, NAD, BP elevated. IV hydralazine and PO labetalol given per MAR. Lungs are clear bilaterally on 0.5L NC. Inquired O2 use at home, pt says he does not normally use oxygen supplementation, but it has been placed on him while in ED d/t his SpO2 dropping to 70s. Informed pt that plan is to wean him back down to baseline, pt agreed and verbalized his understanding. Active bowel sounds in all quadrants, abdomen soft and non-tender. Pt endorses diarrheal episode earlier during the day and uses bedpan. R BKA CDI, prosthetic at bedside, neurovasc intact. Encouraged pt to call before getting OOB. Old TDC site to L upper chest with foam CDI, R groin TDC accessed MWF. Bed placed in lowest position, +alarm, call bell and belongings within reach. Apple juice, crackers, and peanut butter left on bedside table per pt request d/t \"blood sugar dropping at night. \"    0130 Pt resting comfortable with eyes closed, easy to arouse with tactile stimuli. No change in pt status. Will continue current plan of care. 0430 VS pending. No change in pt status. 0720 Bedside and Verbal shift change report given to China eRno RN (oncoming nurse) by Low Jang RN (offgoing nurse). Report included the following information Nurse Handoff Report, Index, Adult Overview, Intake/Output, MAR, and Recent Results. This RN is signing off.

## 2023-07-18 NOTE — PLAN OF CARE
Problem: Chronic Conditions and Co-morbidities  Goal: Patient's chronic conditions and co-morbidity symptoms are monitored and maintained or improved  7/18/2023 0143 by Nichol Pemberton RN  Outcome: Progressing  Flowsheets (Taken 7/17/2023 2040)  Care Plan - Patient's Chronic Conditions and Co-Morbidity Symptoms are Monitored and Maintained or Improved:   Monitor and assess patient's chronic conditions and comorbid symptoms for stability, deterioration, or improvement   Collaborate with multidisciplinary team to address chronic and comorbid conditions and prevent exacerbation or deterioration   Update acute care plan with appropriate goals if chronic or comorbid symptoms are exacerbated and prevent overall improvement and discharge     Problem: Pain  Goal: Verbalizes/displays adequate comfort level or baseline comfort level  Outcome: Progressing  Flowsheets (Taken 7/17/2023 2040)  Verbalizes/displays adequate comfort level or baseline comfort level:   Encourage patient to monitor pain and request assistance   Assess pain using appropriate pain scale   Administer analgesics based on type and severity of pain and evaluate response   Implement non-pharmacological measures as appropriate and evaluate response   Notify Licensed Independent Practitioner if interventions unsuccessful or patient reports new pain     Problem: Skin/Tissue Integrity  Goal: Absence of new skin breakdown  Description: 1. Monitor for areas of redness and/or skin breakdown  2. Assess vascular access sites hourly  3. Every 4-6 hours minimum:  Change oxygen saturation probe site  4. Every 4-6 hours:  If on nasal continuous positive airway pressure, respiratory therapy assess nares and determine need for appliance change or resting period.   Outcome: Progressing     Problem: ABCDS Injury Assessment  Goal: Absence of physical injury  Outcome: Progressing  Flowsheets (Taken 7/17/2023 2040)  Absence of Physical Injury: Implement safety measures

## 2023-07-18 NOTE — PROGRESS NOTES
[facility]  (A Division of [facility])      Progress Note        Date of Admission: 7/14/2023      Date of Note: 7/18/2023      Reason for Referral: external catheter infection        Current Antimicrobials:    Prior Antimicrobials: Ancef 7/15- present   Cefazolin IV (7/12)  Cefepime IV (7/14-7/15  Meropenem 7/14  Daptomycin 7/15- 7/17     CC:  \"Feeling crummy\"    SUBJECTIVE:   Seen and examined. No new events. No fevers or chills. New HD cath to be placed Wednesday    Assessment: Rec / Plan:   L Chest Wall Tunneled Catheter (external) Infection - removed 7/14  --Blood cultures remain negative  -Wound cultures with MSSA - Continue Ancef for MSSA- 3/3/2  - plan for 2 weeks total therapy from 7/14  - >Plans for tunnelled HD cath tomorrow with vascular         Drug Effect - Cefepime (involuntary/myoclonus)   - resolved   ESRD/HD    DMT2 tight control  A1c 4%    HTN          Microbiology:  7/14 - BCx x2 sent      Wd light MSSA      Lines / Catheters: peripheral      HPI:  CC:  \"Feeling crummy\"  Mr Liane Guillen is a 52y AAM with ESRD/HD who noted to have some suppurative DC from his tunneled L IJ exit site on 7/12 for which he received 2gm Cefazolin IV and wd culture sent from HD unit. Returned to HD unit today with progressive/worsening DC and feeling fatigued/malaise for which he was sent promptly to ED for admission. Pulled device in ED and placed temp R Fem HD catheter. Denies f/s/c. But does not feel well. Received first/only dose of cefepime @ 1407h and has noted involuntary movements of his legs since.              Principal Problem:    Infection of hemodialysis tunneled catheter ([address])  Active Problems:    ESRD (end stage renal disease) on dialysis (HCC)    Chronic anemia    Hypertensive urgency    Involuntary jerky movements  Resolved Problems:    End-stage renal disease needing dialysis [facility])    Past Medical History:   Diagnosis Date Chronic kidney disease     Diabetes ([address])     Heart failure (HCC)     Hemodialysis patient ([address])     Hypertension     Psychiatric illness 4/22/2023    Sickle cell trait [facility])      Past Surgical History:   Procedure Laterality Date    IR NONTUNNELED VASCULAR CATHETER  6/1/2022    IR NONTUNNELED VASCULAR CATHETER 6/1/2022 [address] RAD ANGIO IR    IR NONTUNNELED VASCULAR CATHETER  6/1/2022    IR NONTUNNELED VASCULAR CATHETER  2/8/2023    IR NONTUNNELED VASCULAR CATHETER 2/8/2023 [address] RAD ANGIO IR    IR THRMB/INFUSION DIAYSIS CIRCUIT  6/3/2022    IR THRMB/INFUSION DIAYSIS CIRCUIT 6/3/2022 [address] RAD ANGIO IR    IR THRMB/INFUSION DIAYSIS CIRCUIT  6/3/2022    IR TUNNELED CATHETER PLACEMENT GREATER THAN 5 YEARS  6/26/2022    IR TUNNELED CATHETER PLACEMENT GREATER THAN 5 YEARS 6/26/2022 [address] RAD ANGIO IR    IR TUNNELED CATHETER PLACEMENT GREATER THAN 5 YEARS  6/26/2022    IR TUNNELED CATHETER PLACEMENT GREATER THAN 5 YEARS  2/7/2023    IR TUNNELED CATHETER PLACEMENT GREATER THAN 5 YEARS 2/7/2023 [address] RAD ANGIO IR    ORTHOPEDIC SURGERY      right BKA 2017     Family History   Problem Relation Age of Onset    Sickle Cell Anemia Mother     Diabetes Father      Social History     Socioeconomic History    Marital status: Legally      Spouse name: Not on file    Number of children: Not on file    Years of education: Not on file    Highest education level: Not on file   Occupational History    Not on file   Tobacco Use    Smoking status: Former    Smokeless tobacco: Never   Substance and Sexual Activity    Alcohol use: No    Drug use: No    Sexual activity: Not on file   Other Topics Concern    Not on file   Social History Narrative    Not on file     Social Determinants of Health     Financial Resource Strain: Not on file   Food Insecurity: Not on file   Transportation Needs: Not on file   Physical Activity: Not on file   Stress: Not on file   Social Connections: Not on file   Intimate Partner Violence: Not on file   Housing

## 2023-07-19 ENCOUNTER — APPOINTMENT (OUTPATIENT)
Facility: HOSPITAL | Age: 53
End: 2023-07-19
Payer: MEDICARE

## 2023-07-19 PROCEDURE — 6360000002 HC RX W HCPCS: Performed by: FAMILY MEDICINE

## 2023-07-19 PROCEDURE — 1100000000 HC RM PRIVATE

## 2023-07-19 PROCEDURE — 6370000000 HC RX 637 (ALT 250 FOR IP): Performed by: HOSPITALIST

## 2023-07-19 PROCEDURE — 6360000002 HC RX W HCPCS: Performed by: INTERNAL MEDICINE

## 2023-07-19 PROCEDURE — 2580000003 HC RX 258: Performed by: INTERNAL MEDICINE

## 2023-07-19 PROCEDURE — 6360000002 HC RX W HCPCS: Performed by: HOSPITALIST

## 2023-07-19 PROCEDURE — 2700000000 HC OXYGEN THERAPY PER DAY

## 2023-07-19 PROCEDURE — 90935 HEMODIALYSIS ONE EVALUATION: CPT

## 2023-07-19 PROCEDURE — 6370000000 HC RX 637 (ALT 250 FOR IP): Performed by: STUDENT IN AN ORGANIZED HEALTH CARE EDUCATION/TRAINING PROGRAM

## 2023-07-19 RX ORDER — HEPARIN SODIUM 200 [USP'U]/100ML
1000 INJECTION, SOLUTION INTRAVENOUS ONCE
Status: DISCONTINUED | OUTPATIENT
Start: 2023-07-19 | End: 2023-07-19

## 2023-07-19 RX ORDER — MIDAZOLAM HYDROCHLORIDE 2 MG/2ML
2 INJECTION, SOLUTION INTRAMUSCULAR; INTRAVENOUS
Status: DISCONTINUED | OUTPATIENT
Start: 2023-07-19 | End: 2023-07-19

## 2023-07-19 RX ORDER — LABETALOL 200 MG/1
200 TABLET, FILM COATED ORAL EVERY 12 HOURS SCHEDULED
Status: DISCONTINUED | OUTPATIENT
Start: 2023-07-19 | End: 2023-07-21 | Stop reason: HOSPADM

## 2023-07-19 RX ORDER — FENTANYL CITRATE 50 UG/ML
100 INJECTION, SOLUTION INTRAMUSCULAR; INTRAVENOUS
Status: DISCONTINUED | OUTPATIENT
Start: 2023-07-19 | End: 2023-07-19

## 2023-07-19 RX ORDER — HYDRALAZINE HYDROCHLORIDE 20 MG/ML
20 INJECTION INTRAMUSCULAR; INTRAVENOUS ONCE
Status: COMPLETED | OUTPATIENT
Start: 2023-07-19 | End: 2023-07-19

## 2023-07-19 RX ORDER — LIDOCAINE HYDROCHLORIDE 10 MG/ML
20 INJECTION, SOLUTION INFILTRATION; PERINEURAL ONCE
Status: DISCONTINUED | OUTPATIENT
Start: 2023-07-19 | End: 2023-07-19

## 2023-07-19 RX ORDER — DOXAZOSIN MESYLATE 4 MG/1
4 TABLET ORAL DAILY
Status: DISCONTINUED | OUTPATIENT
Start: 2023-07-19 | End: 2023-07-19

## 2023-07-19 RX ORDER — FLUMAZENIL 0.1 MG/ML
0.2 INJECTION INTRAVENOUS ONCE
Status: DISCONTINUED | OUTPATIENT
Start: 2023-07-19 | End: 2023-07-19

## 2023-07-19 RX ORDER — LIDOCAINE HYDROCHLORIDE AND EPINEPHRINE 10; 10 MG/ML; UG/ML
20 INJECTION, SOLUTION INFILTRATION; PERINEURAL ONCE
Status: DISCONTINUED | OUTPATIENT
Start: 2023-07-19 | End: 2023-07-19

## 2023-07-19 RX ORDER — HYDRALAZINE HYDROCHLORIDE 25 MG/1
100 TABLET, FILM COATED ORAL EVERY 8 HOURS SCHEDULED
Status: DISCONTINUED | OUTPATIENT
Start: 2023-07-19 | End: 2023-07-21 | Stop reason: HOSPADM

## 2023-07-19 RX ORDER — NALOXONE HYDROCHLORIDE 0.4 MG/ML
0.4 INJECTION, SOLUTION INTRAMUSCULAR; INTRAVENOUS; SUBCUTANEOUS
Status: DISCONTINUED | OUTPATIENT
Start: 2023-07-19 | End: 2023-07-19

## 2023-07-19 RX ORDER — HEPARIN SODIUM 1000 [USP'U]/ML
5000 INJECTION, SOLUTION INTRAVENOUS; SUBCUTANEOUS
Status: DISCONTINUED | OUTPATIENT
Start: 2023-07-19 | End: 2023-07-19

## 2023-07-19 RX ORDER — ISOSORBIDE MONONITRATE 60 MG/1
60 TABLET, EXTENDED RELEASE ORAL DAILY
Status: DISCONTINUED | OUTPATIENT
Start: 2023-07-19 | End: 2023-07-19

## 2023-07-19 RX ADMIN — HYDRALAZINE HYDROCHLORIDE 20 MG: 20 INJECTION INTRAMUSCULAR; INTRAVENOUS at 12:46

## 2023-07-19 RX ADMIN — HYDRALAZINE HYDROCHLORIDE 100 MG: 50 TABLET, FILM COATED ORAL at 10:07

## 2023-07-19 RX ADMIN — AMLODIPINE BESYLATE 10 MG: 5 TABLET ORAL at 10:08

## 2023-07-19 RX ADMIN — SEVELAMER CARBONATE 800 MG: 800 TABLET, FILM COATED ORAL at 17:49

## 2023-07-19 RX ADMIN — CEFAZOLIN 1000 MG: 1 INJECTION, POWDER, FOR SOLUTION INTRAMUSCULAR; INTRAVENOUS at 17:49

## 2023-07-19 RX ADMIN — SEVELAMER CARBONATE 800 MG: 800 TABLET, FILM COATED ORAL at 12:46

## 2023-07-19 RX ADMIN — HEPARIN SODIUM 5000 UNITS: 5000 INJECTION INTRAVENOUS; SUBCUTANEOUS at 22:34

## 2023-07-19 RX ADMIN — HYDRALAZINE HYDROCHLORIDE 100 MG: 50 TABLET, FILM COATED ORAL at 22:34

## 2023-07-19 RX ADMIN — LOSARTAN POTASSIUM 100 MG: 50 TABLET, FILM COATED ORAL at 10:07

## 2023-07-19 RX ADMIN — LABETALOL HYDROCHLORIDE 200 MG: 200 TABLET, FILM COATED ORAL at 22:34

## 2023-07-19 RX ADMIN — HEPARIN SODIUM 5000 UNITS: 5000 INJECTION INTRAVENOUS; SUBCUTANEOUS at 06:46

## 2023-07-19 RX ADMIN — CALCITRIOL 0.25 MCG: 0.25 CAPSULE ORAL at 10:04

## 2023-07-19 RX ADMIN — HEPARIN SODIUM 5000 UNITS: 5000 INJECTION INTRAVENOUS; SUBCUTANEOUS at 12:47

## 2023-07-19 RX ADMIN — LABETALOL HYDROCHLORIDE 200 MG: 200 TABLET, FILM COATED ORAL at 10:08

## 2023-07-19 ASSESSMENT — PAIN SCALES - GENERAL: PAINLEVEL_OUTOF10: 0

## 2023-07-19 NOTE — PROGRESS NOTES
History:   Diagnosis Date    Chronic kidney disease     Diabetes ([address])     Heart failure (HCC)     Hemodialysis patient ([address])     Hypertension     Psychiatric illness 4/22/2023    Sickle cell trait (HCC)      Past Surgical History:   Procedure Laterality Date    IR NONTUNNELED VASCULAR CATHETER  6/1/2022    IR NONTUNNELED VASCULAR CATHETER 6/1/2022 [facility] RAD ANGIO IR    IR NONTUNNELED VASCULAR CATHETER  6/1/2022    IR NONTUNNELED VASCULAR CATHETER  2/8/2023    IR NONTUNNELED VASCULAR CATHETER 2/8/2023 [facility] RAD ANGIO IR    IR THRMB/INFUSION DIAYSIS CIRCUIT  6/3/2022    IR THRMB/INFUSION DIAYSIS CIRCUIT 6/3/2022 [facility] RAD ANGIO IR    IR THRMB/INFUSION DIAYSIS CIRCUIT  6/3/2022    IR TUNNELED CATHETER PLACEMENT GREATER THAN 5 YEARS  6/26/2022    IR TUNNELED CATHETER PLACEMENT GREATER THAN 5 YEARS 6/26/2022 [facility] RAD ANGIO IR    IR TUNNELED CATHETER PLACEMENT GREATER THAN 5 YEARS  6/26/2022    IR TUNNELED CATHETER PLACEMENT GREATER THAN 5 YEARS  2/7/2023    IR TUNNELED CATHETER PLACEMENT GREATER THAN 5 YEARS 2/7/2023 [facility] RAD ANGIO IR    ORTHOPEDIC SURGERY      right BKA 2017     Family History   Problem Relation Age of Onset    Sickle Cell Anemia Mother     Diabetes Father      Social History     Socioeconomic History    Marital status: Legally      Spouse name: Not on file    Number of children: Not on file    Years of education: Not on file    Highest education level: Not on file   Occupational History    Not on file   Tobacco Use    Smoking status: Former    Smokeless tobacco: Never   Substance and Sexual Activity    Alcohol use: No    Drug use: No    Sexual activity: Not on file   Other Topics Concern    Not on file   Social History Narrative    Not on file     Social Determinants of Health     Financial Resource Strain: Not on file   Food Insecurity: Not on file   Transportation Needs: Not on file   Physical Activity: Not on file   Stress: Not on file   Social Connections: Not on file   Intimate Partner Violence: Staphylococcus aureus          Susceptibility        Staphylococcus aureus      BACTERIAL SUSCEPTIBILITY PANEL BROOKE      ciprofloxacin >=8 ug/mL Resistant      DAPTOmycin 0.25 ug/mL Sensitive      doxycycline <=0.5 ug/mL Sensitive      gentamicin <=0.5 ug/mL Sensitive      Inducible Clindamycin Negative ug/mL Sensitive      levofloxacin >=8 ug/mL Resistant      linezolid 4 ug/mL Sensitive      moxifloxacin >=8 ug/mL Resistant      oxacillin 0.5 ug/mL Sensitive      rifampin <=0.5 ug/mL Sensitive      tetracycline <=1 ug/mL Sensitive      trimethoprim-sulfamethoxazole <=10 ug/mL Sensitive      vancomycin <=0.5 ug/mL Sensitive                                     [facility]  7/19/2023   2:50 PM

## 2023-07-19 NOTE — PROGRESS NOTES
Received verbal  orders with read back to restart diet orders from Dr Chuy Bobo. Dr victoria dialysis catheter insertion cancelled for today.

## 2023-07-19 NOTE — CARE COORDINATION
D/c plan; Home w/ [address] [facility] [place]. Pt to drive himself. CM spoke w/ pt. Discussed HH at d/c. Pt is in agreement. Discussed FOC. Pt will d/c home w/ [address] [facility] [place]. CM noted pt will receive IV abx w/ his HD; MWF at [facility]. IV abx to be ordered and arranged by nephrology.

## 2023-07-19 NOTE — PROGRESS NOTES
Spoke with Natalie at [facility] to schedule pt for [facility]. Radiologist available to perform on today.

## 2023-07-19 NOTE — PROGRESS NOTES
Due to complexity of patient, IR is unable to place [facility] at this time. Please contact [phone] [facility] Vascular surgery for further questions, patient may resume previous diet.

## 2023-07-20 ENCOUNTER — HOME HEALTH ADMISSION (OUTPATIENT)
Age: 53
End: 2023-07-20

## 2023-07-20 PROCEDURE — 6360000002 HC RX W HCPCS: Performed by: FAMILY MEDICINE

## 2023-07-20 PROCEDURE — 2580000003 HC RX 258: Performed by: FAMILY MEDICINE

## 2023-07-20 PROCEDURE — 6370000000 HC RX 637 (ALT 250 FOR IP): Performed by: STUDENT IN AN ORGANIZED HEALTH CARE EDUCATION/TRAINING PROGRAM

## 2023-07-20 PROCEDURE — 2580000003 HC RX 258: Performed by: INTERNAL MEDICINE

## 2023-07-20 PROCEDURE — 1100000000 HC RM PRIVATE

## 2023-07-20 PROCEDURE — 6360000002 HC RX W HCPCS: Performed by: INTERNAL MEDICINE

## 2023-07-20 PROCEDURE — 6370000000 HC RX 637 (ALT 250 FOR IP): Performed by: HOSPITALIST

## 2023-07-20 RX ADMIN — CALCITRIOL 0.25 MCG: 0.25 CAPSULE ORAL at 08:57

## 2023-07-20 RX ADMIN — LABETALOL HYDROCHLORIDE 200 MG: 200 TABLET, FILM COATED ORAL at 21:04

## 2023-07-20 RX ADMIN — AMLODIPINE BESYLATE 10 MG: 5 TABLET ORAL at 08:57

## 2023-07-20 RX ADMIN — SEVELAMER CARBONATE 800 MG: 800 TABLET, FILM COATED ORAL at 15:49

## 2023-07-20 RX ADMIN — SODIUM CHLORIDE, PRESERVATIVE FREE 10 ML: 5 INJECTION INTRAVENOUS at 11:10

## 2023-07-20 RX ADMIN — LABETALOL HYDROCHLORIDE 200 MG: 200 TABLET, FILM COATED ORAL at 08:57

## 2023-07-20 RX ADMIN — LOSARTAN POTASSIUM 100 MG: 50 TABLET, FILM COATED ORAL at 08:57

## 2023-07-20 RX ADMIN — HYDRALAZINE HYDROCHLORIDE 100 MG: 50 TABLET, FILM COATED ORAL at 04:53

## 2023-07-20 RX ADMIN — HYDRALAZINE HYDROCHLORIDE 100 MG: 50 TABLET, FILM COATED ORAL at 15:49

## 2023-07-20 RX ADMIN — CEFAZOLIN 1000 MG: 1 INJECTION, POWDER, FOR SOLUTION INTRAMUSCULAR; INTRAVENOUS at 18:35

## 2023-07-20 RX ADMIN — HEPARIN SODIUM 5000 UNITS: 5000 INJECTION INTRAVENOUS; SUBCUTANEOUS at 21:04

## 2023-07-20 RX ADMIN — SODIUM CHLORIDE, PRESERVATIVE FREE 10 ML: 5 INJECTION INTRAVENOUS at 21:04

## 2023-07-20 RX ADMIN — HEPARIN SODIUM 5000 UNITS: 5000 INJECTION INTRAVENOUS; SUBCUTANEOUS at 06:48

## 2023-07-20 RX ADMIN — HEPARIN SODIUM 5000 UNITS: 5000 INJECTION INTRAVENOUS; SUBCUTANEOUS at 15:49

## 2023-07-20 RX ADMIN — HYDRALAZINE HYDROCHLORIDE 100 MG: 50 TABLET, FILM COATED ORAL at 21:04

## 2023-07-20 NOTE — PROGRESS NOTES
Appreciate vascular assistance, waiting for vascular to place [facility] [place]    Salt and potassium restricted diet .   Per ID cefazolin 1 gram daily in house , as outpatient on  MWF 3/3/2 gram  Iv cefazolin post HD through 7/28/23       Uncontrolled Hypertension: He takes these meds :-  amlodipine 10 mg,  losartan 100 mg ,  clonidine patch 0.3 mg/week, labetalol 200 mg BID , aldactone 100 mg  , cardura 4 mg   Hydralazine 100   mg TID   continue  amlodipine 10 mg,  labetalol 100 mg BID and losartan 100 mg daily, resumed Hydrazine  100 MG TID for high BP      Epogen for anemia of CKD during HD as indicated     Transfusion to keep hemoglobin above 7 gram/dl      BMD:- calcitriol for hypocalcemia   Sevelamer for hyperphosphatemia       Hafsa Palmer MD, MD  [facility], [facility].- Nephrology

## 2023-07-20 NOTE — PROGRESS NOTES
Patient schedule to for dialysis cath procedure tomorrow at 08:00. Patient to go NPO after midnight. Chemistry labs needs to be resulted before procedure. Night SANIA Griffiths made aware.

## 2023-07-20 NOTE — PLAN OF CARE
Intervention: Monitor/Manage Fluid Electrolyte/Acid Base Balance     PROBLEM: HEMODIALYSIS ADULT     INTERVENTION: Hemodynamic stabilization  Maintain BP WNL for this patient while on hemodialysis     INTERVENTION: Fluid Management  Will attempt 2000 ml total fluid removal as tolerated     INTERVENTION: Metabolic / Electrolyte Imbalance Management  2 K+ potassium, 2.5 Ca calcium bath today with dialysis     GOAL: Signs and symptoms of listed potential problems will be absent or manageable  (reference Hemodialysis ADULT CPG)     OUTCOME: Progressing  HD planned for 3.5 hours today        Problem: Chronic Renal Failure  Goal: *Fluid and electrolytes stabilized  Outcome: Progressing Towards Goal     Problem: Patient Education: Go to Patient Education Activity  Goal: Patient/Family Education  Outcome: Progressing Towards Goal

## 2023-07-20 NOTE — PROGRESS NOTES
Patient had multiple soft very nurys Bowel movements today. Patient using bedside commode at this time, using prosthetic leg and assistance x1 from nursing.

## 2023-07-20 NOTE — PROGRESS NOTES
Progress Summary:  Assumed care of patient awake and alert. Tolerated dialysis without incident. Elevated blood pressure but due for scheduled blood pressure. Status Unchanged Eliminated large soft stool but without urine output. Call bell within reach.

## 2023-07-20 NOTE — PROGRESS NOTES
Hemodialysis at bedside. 3.5 hrs completed. Vss during tx. 2 K+ and 2.5 dialysate used today. R fem cath access patent. 2.5 Liters removed. No c/o cramping or discomfort related to dialysis. Post HD report given to DALTON Hammer RN.

## 2023-07-20 NOTE — PROGRESS NOTES
Pre-dialysis handoff received from DALTON Eckert RN. Pt resting between care. R fem cath patent. This RN wearing PPE.

## 2023-07-20 NOTE — PROGRESS NOTES
Comprehensive Nutrition Assessment    Type and Reason for Visit:  Initial, RD Nutrition Re-Screen/LOS    Nutrition Recommendations/Plan:   Diet as ordered  Recommend Nepro w/cho steady once a day      Malnutrition Assessment:  Malnutrition Status: At risk for malnutrition (Comment) (07/20/23 1007)    Weight Loss:  Unable to assess     Body Fat Loss: Moderate body fat loss Triceps   Muscle Mass Loss: Moderate muscle mass loss Calf (gastrocnemius)    Nutrition Assessment:    49 yo male admitted on 7/14 for infection of hemodialysis tunneled catheter. Hx of ESRD, HTN, diabetes, anemia, BKA-right. Pertient labs crea 9.2 mg/dL, eGFR 6, ALT 9U/L. Current on low fat/chol, high fiber, RAQUEL and low phospho diet. Po intake >/= 75%. Positive screen for wt loss: >5% *3 months. NFPE indicated moderate muscle and subcutaneous at loss. At risk for malnutrition. Nutrition Related Findings:    Labs and Meds reviewed. Wound Type: Full Thickness (left heel)       Current Nutrition Intake & Therapies:    Average Meal Intake: %  Average Supplements Intake: None Ordered  ADULT DIET; Regular; Low Fat/Low Chol/High Fiber/RAQUEL; Low Phosphorus (Less than 1000 mg)    Anthropometric Measures:  Height: 5' 7\" (170.2 cm)  Ideal Body Weight (IBW): 148 lbs (67 kg)       Current Body Weight: 165 lb 5.5 oz (75 kg) (on 7/14), 111.7 % IBW. Weight Source: Not Specified  Current BMI (kg/m2): 25.9  Usual Body Weight: 176 lb 5.9 oz (80 kg) (bed-scaled on 4/14)  % Weight Change (Calculated): -6.2  Weight Adjustment For: Amputation  Total Adjusted Percentage (Calculated): 5.9  Adjusted Ideal Body Weight (lbs) (Calculated): 139.3 lbs  Adjusted Ideal Body Weight (kg) (Calculated): 63.32 kg  Adjusted % Ideal Body Weight (Calculated): 118.7  Adjusted BMI (kg/m2) (Calculated): 27.4  BMI Categories: Overweight (BMI 25.0-29. 9)    Estimated Daily Nutrient Needs:  Energy Requirements Based On: Kcal/kg  Weight Used for Energy Requirements: Current  Energy

## 2023-07-21 ENCOUNTER — APPOINTMENT (OUTPATIENT)
Facility: HOSPITAL | Age: 53
End: 2023-07-21
Payer: MEDICARE

## 2023-07-21 VITALS
DIASTOLIC BLOOD PRESSURE: 70 MMHG | RESPIRATION RATE: 18 BRPM | OXYGEN SATURATION: 99 % | WEIGHT: 184.53 LBS | TEMPERATURE: 98.3 F | HEIGHT: 67 IN | BODY MASS INDEX: 28.96 KG/M2 | SYSTOLIC BLOOD PRESSURE: 154 MMHG | HEART RATE: 85 BPM

## 2023-07-21 LAB
ANION GAP SERPL CALC-SCNC: 8 MMOL/L (ref 3–18)
BUN SERPL-MCNC: 36 MG/DL (ref 7–18)
BUN/CREAT SERPL: 5 (ref 12–20)
CALCIUM SERPL-MCNC: 8.4 MG/DL (ref 8.5–10.1)
CHLORIDE SERPL-SCNC: 102 MMOL/L (ref 100–111)
CO2 SERPL-SCNC: 26 MMOL/L (ref 21–32)
CREAT SERPL-MCNC: 7.87 MG/DL (ref 0.6–1.3)
GLUCOSE SERPL-MCNC: 77 MG/DL (ref 74–99)
POTASSIUM SERPL-SCNC: 4.6 MMOL/L (ref 3.5–5.5)
SODIUM SERPL-SCNC: 136 MMOL/L (ref 136–145)

## 2023-07-21 PROCEDURE — 36415 COLL VENOUS BLD VENIPUNCTURE: CPT

## 2023-07-21 PROCEDURE — 2580000003 HC RX 258: Performed by: HOSPITALIST

## 2023-07-21 PROCEDURE — 6360000004 HC RX CONTRAST MEDICATION: Performed by: STUDENT IN AN ORGANIZED HEALTH CARE EDUCATION/TRAINING PROGRAM

## 2023-07-21 PROCEDURE — 2580000003 HC RX 258: Performed by: STUDENT IN AN ORGANIZED HEALTH CARE EDUCATION/TRAINING PROGRAM

## 2023-07-21 PROCEDURE — 6360000002 HC RX W HCPCS: Performed by: FAMILY MEDICINE

## 2023-07-21 PROCEDURE — 2500000003 HC RX 250 WO HCPCS: Performed by: STUDENT IN AN ORGANIZED HEALTH CARE EDUCATION/TRAINING PROGRAM

## 2023-07-21 PROCEDURE — 6370000000 HC RX 637 (ALT 250 FOR IP): Performed by: HOSPITALIST

## 2023-07-21 PROCEDURE — C1769 GUIDE WIRE: HCPCS

## 2023-07-21 PROCEDURE — 02HV33Z INSERTION OF INFUSION DEVICE INTO SUPERIOR VENA CAVA, PERCUTANEOUS APPROACH: ICD-10-PCS | Performed by: STUDENT IN AN ORGANIZED HEALTH CARE EDUCATION/TRAINING PROGRAM

## 2023-07-21 PROCEDURE — 2580000003 HC RX 258: Performed by: FAMILY MEDICINE

## 2023-07-21 PROCEDURE — 90935 HEMODIALYSIS ONE EVALUATION: CPT

## 2023-07-21 PROCEDURE — 99152 MOD SED SAME PHYS/QHP 5/>YRS: CPT | Performed by: STUDENT IN AN ORGANIZED HEALTH CARE EDUCATION/TRAINING PROGRAM

## 2023-07-21 PROCEDURE — 6360000002 HC RX W HCPCS: Performed by: STUDENT IN AN ORGANIZED HEALTH CARE EDUCATION/TRAINING PROGRAM

## 2023-07-21 PROCEDURE — 80048 BASIC METABOLIC PNL TOTAL CA: CPT

## 2023-07-21 PROCEDURE — 6370000000 HC RX 637 (ALT 250 FOR IP): Performed by: STUDENT IN AN ORGANIZED HEALTH CARE EDUCATION/TRAINING PROGRAM

## 2023-07-21 PROCEDURE — 0JH60XZ INSERTION OF TUNNELED VASCULAR ACCESS DEVICE INTO CHEST SUBCUTANEOUS TISSUE AND FASCIA, OPEN APPROACH: ICD-10-PCS | Performed by: STUDENT IN AN ORGANIZED HEALTH CARE EDUCATION/TRAINING PROGRAM

## 2023-07-21 PROCEDURE — 6360000002 HC RX W HCPCS: Performed by: HOSPITALIST

## 2023-07-21 RX ORDER — LIDOCAINE HYDROCHLORIDE 10 MG/ML
20 INJECTION, SOLUTION INFILTRATION; PERINEURAL ONCE
Status: DISCONTINUED | OUTPATIENT
Start: 2023-07-21 | End: 2023-07-21 | Stop reason: HOSPADM

## 2023-07-21 RX ORDER — HEPARIN SODIUM 1000 [USP'U]/ML
1000 INJECTION, SOLUTION INTRAVENOUS; SUBCUTANEOUS ONCE
Status: DISCONTINUED | OUTPATIENT
Start: 2023-07-21 | End: 2023-07-21 | Stop reason: HOSPADM

## 2023-07-21 RX ORDER — FENTANYL CITRATE 50 UG/ML
INJECTION, SOLUTION INTRAMUSCULAR; INTRAVENOUS PRN
Status: COMPLETED | OUTPATIENT
Start: 2023-07-21 | End: 2023-07-21

## 2023-07-21 RX ORDER — LIDOCAINE HYDROCHLORIDE AND EPINEPHRINE BITARTRATE 20; .01 MG/ML; MG/ML
INJECTION, SOLUTION SUBCUTANEOUS PRN
Status: COMPLETED | OUTPATIENT
Start: 2023-07-21 | End: 2023-07-21

## 2023-07-21 RX ORDER — LIDOCAINE HYDROCHLORIDE AND EPINEPHRINE 10; 10 MG/ML; UG/ML
20 INJECTION, SOLUTION INFILTRATION; PERINEURAL ONCE
Status: DISCONTINUED | OUTPATIENT
Start: 2023-07-21 | End: 2023-07-21 | Stop reason: HOSPADM

## 2023-07-21 RX ORDER — NALOXONE HYDROCHLORIDE 0.4 MG/ML
0.4 INJECTION, SOLUTION INTRAMUSCULAR; INTRAVENOUS; SUBCUTANEOUS
Status: DISCONTINUED | OUTPATIENT
Start: 2023-07-21 | End: 2023-07-21 | Stop reason: HOSPADM

## 2023-07-21 RX ORDER — MIDAZOLAM HYDROCHLORIDE 2 MG/2ML
INJECTION, SOLUTION INTRAMUSCULAR; INTRAVENOUS PRN
Status: COMPLETED | OUTPATIENT
Start: 2023-07-21 | End: 2023-07-21

## 2023-07-21 RX ORDER — HEPARIN SODIUM 1000 [USP'U]/ML
INJECTION, SOLUTION INTRAVENOUS; SUBCUTANEOUS PRN
Status: COMPLETED | OUTPATIENT
Start: 2023-07-21 | End: 2023-07-21

## 2023-07-21 RX ORDER — FLUMAZENIL 0.1 MG/ML
0.2 INJECTION INTRAVENOUS ONCE
Status: DISCONTINUED | OUTPATIENT
Start: 2023-07-21 | End: 2023-07-21 | Stop reason: HOSPADM

## 2023-07-21 RX ORDER — HEPARIN SODIUM 200 [USP'U]/100ML
500 INJECTION, SOLUTION INTRAVENOUS ONCE
Status: COMPLETED | OUTPATIENT
Start: 2023-07-21 | End: 2023-07-21

## 2023-07-21 RX ORDER — MIDAZOLAM HYDROCHLORIDE 2 MG/2ML
2 INJECTION, SOLUTION INTRAMUSCULAR; INTRAVENOUS
Status: DISCONTINUED | OUTPATIENT
Start: 2023-07-21 | End: 2023-07-21 | Stop reason: HOSPADM

## 2023-07-21 RX ORDER — FENTANYL CITRATE 50 UG/ML
100 INJECTION, SOLUTION INTRAMUSCULAR; INTRAVENOUS
Status: DISCONTINUED | OUTPATIENT
Start: 2023-07-21 | End: 2023-07-21 | Stop reason: HOSPADM

## 2023-07-21 RX ORDER — SODIUM CHLORIDE 0.9 % (FLUSH) 0.9 %
SYRINGE (ML) INJECTION PRN
Status: COMPLETED | OUTPATIENT
Start: 2023-07-21 | End: 2023-07-21

## 2023-07-21 RX ORDER — LIDOCAINE HYDROCHLORIDE 10 MG/ML
INJECTION, SOLUTION EPIDURAL; INFILTRATION; INTRACAUDAL; PERINEURAL PRN
Status: COMPLETED | OUTPATIENT
Start: 2023-07-21 | End: 2023-07-21

## 2023-07-21 RX ADMIN — HYDRALAZINE HYDROCHLORIDE 100 MG: 50 TABLET, FILM COATED ORAL at 05:55

## 2023-07-21 RX ADMIN — HEPARIN SODIUM 3000 UNITS: 1000 INJECTION INTRAVENOUS; SUBCUTANEOUS at 09:30

## 2023-07-21 RX ADMIN — SODIUM CHLORIDE, PRESERVATIVE FREE 10 ML: 5 INJECTION INTRAVENOUS at 10:58

## 2023-07-21 RX ADMIN — FENTANYL CITRATE 50 MCG: 50 INJECTION, SOLUTION INTRAMUSCULAR; INTRAVENOUS at 08:34

## 2023-07-21 RX ADMIN — SEVELAMER CARBONATE 800 MG: 800 TABLET, FILM COATED ORAL at 15:28

## 2023-07-21 RX ADMIN — SODIUM CHLORIDE, PRESERVATIVE FREE 250 ML: 5 INJECTION INTRAVENOUS at 08:30

## 2023-07-21 RX ADMIN — CEFAZOLIN 2000 MG: 10 INJECTION, POWDER, FOR SOLUTION INTRAVENOUS at 15:29

## 2023-07-21 RX ADMIN — FENTANYL CITRATE 50 MCG: 50 INJECTION, SOLUTION INTRAMUSCULAR; INTRAVENOUS at 09:05

## 2023-07-21 RX ADMIN — MIDAZOLAM HYDROCHLORIDE 1 MG: 1 INJECTION, SOLUTION INTRAMUSCULAR; INTRAVENOUS at 08:33

## 2023-07-21 RX ADMIN — FENTANYL CITRATE 50 MCG: 50 INJECTION, SOLUTION INTRAMUSCULAR; INTRAVENOUS at 08:36

## 2023-07-21 RX ADMIN — LOSARTAN POTASSIUM 100 MG: 50 TABLET, FILM COATED ORAL at 15:56

## 2023-07-21 RX ADMIN — LABETALOL HYDROCHLORIDE 200 MG: 200 TABLET, FILM COATED ORAL at 15:57

## 2023-07-21 RX ADMIN — IOPAMIDOL 35 ML: 612 INJECTION, SOLUTION INTRAVENOUS at 09:00

## 2023-07-21 RX ADMIN — HEPARIN SODIUM 1000 UNITS: 200 INJECTION, SOLUTION INTRAVENOUS at 08:30

## 2023-07-21 RX ADMIN — HEPARIN SODIUM 5000 UNITS: 5000 INJECTION INTRAVENOUS; SUBCUTANEOUS at 15:36

## 2023-07-21 RX ADMIN — LIDOCAINE HYDROCHLORIDE 20 ML: 10 INJECTION, SOLUTION EPIDURAL; INFILTRATION; INTRACAUDAL; PERINEURAL at 09:00

## 2023-07-21 RX ADMIN — AMLODIPINE BESYLATE 10 MG: 5 TABLET ORAL at 15:58

## 2023-07-21 RX ADMIN — LIDOCAINE HYDROCHLORIDE AND EPINEPHRINE 20 ML: 20; 10 INJECTION, SOLUTION INFILTRATION; PERINEURAL at 09:00

## 2023-07-21 RX ADMIN — CALCITRIOL 0.25 MCG: 0.25 CAPSULE ORAL at 10:44

## 2023-07-21 RX ADMIN — MIDAZOLAM HYDROCHLORIDE 1 MG: 1 INJECTION, SOLUTION INTRAMUSCULAR; INTRAVENOUS at 09:05

## 2023-07-21 RX ADMIN — MIDAZOLAM HYDROCHLORIDE 1 MG: 1 INJECTION, SOLUTION INTRAMUSCULAR; INTRAVENOUS at 08:37

## 2023-07-21 NOTE — OR NURSING
Pt was educated to procedure and sedation. Pt NPO. Pt's last anticoagulation was subcue heparin last night at 2100.  Pt confirms no problems with sedation in the past.

## 2023-07-21 NOTE — PROGRESS NOTES
07/21/23 1154   Encounter Summary   Encounter Overview/Reason  Initial Encounter   Service Provided For: Patient   Referral/Consult From: [facility]   Last Encounter  07/21/23  ([facility])   Complexity of Encounter Low   Begin Time 1055   End Time  1100   Total Time Calculated 5 min   Encounter    Type Initial Screen/Assessment      attempted to make a visit but patient was working with hospital staff.  provided card with information on spiritual care.  remains available for any spiritual care needs and will continue to follow patient through IDT    Rev. Xenia Cortez.  Bettina Payan[address] [phone]  Pager :[phone]

## 2023-07-21 NOTE — PROGRESS NOTES
Hospitalist Progress Note    Patient: Parag Mendes MRN: [id number]  CSN: [id number]    YOB: 1970  Age: 48 y.o. Sex: male    DOA: 7/14/2023 LOS:  LOS: 6 days                Assessment/Plan     Active Hospital Problems    Diagnosis     Infection of hemodialysis tunneled catheter ([address]) [T82. 7XXA]     Involuntary jerky movements [R25.8]     Hypertensive urgency [I16.0]     Chronic anemia [D64.9]     ESRD (end stage renal disease) on dialysis ([address]) [N18.6, Z99.2]         Chief complaint :  Infected TDC    48 y.o. male with a past medical h/o of ESRD on dialysis with a left anterior wall tunneled dialysis catheter, a very notable history of medical noncompliance with dialysis, uncontrolled hypertension in the past has been admitted with hypertensive urgency, chronic anemia of kidney disease, peripheral vascular disease and status post right below-knee amputation who presents to the emergency department with a complaint of a tunneled catheter malfunction. Left chest wall tunneled catheter site infection -  MSSA infection   Ancef IV for total of 2 weeks per ID   [facility] will be replaced by vascular     Involuntary movements/myoclonus -resolved      Uncontrolled hypertension -  Resume PO hydralazine and give IV additionally X 1     Anemia of chronic kidney disease -  Monitor H&H      End-stage renal disease on hemodialysis Monday Wednesday Friday      History of hypocalcemia      secondary hyperparathyroidism -  sevelamer and calcitriol     Longstanding chronic medical noncompliance -  Advised compliance and lifestyle modification    Disposition : 1-2 days    Review of systems  General: No fevers or chills. Cardiovascular: No chest pain or pressure. No palpitations. Pulmonary: No shortness of breath. Gastrointestinal: No nausea, vomiting. Physical Exam:  General: Awake, cooperative, no acute distress    HEENT: NC, Atraumatic. PERRLA, anicteric sclerae. Lungs: CTA Bilaterally.  No

## 2023-07-21 NOTE — DISCHARGE SUMMARY
She SUMMARY    Name:  Madeleine Jackson  MR#:   [id number]  :  1970  ACCOUNT #:  [de-identified]  ADMIT DATE:  2023  DISCHARGE DATE:  2023      DISCHARGE DIAGNOSES:  1. Tunneled dialysis catheter infection. 2.  Replacement of tunneled dialysis catheter. 3.  Removal of central venous catheter, right groin, that was temporarily in place for dialysis. 4.  Bacteremia. 5.  End-stage renal disease, on hemodialysis. 6.  Uncontrolled hypertension. 7.  Medical noncompliance. 8.  Prior below-knee amputation. CONSULTANTS:  Juan Villela MD, Vascular Surgery. 2.  Terry Marroquin MD, Nephrology. HOSPITAL SUMMARY:  This is a 65-year-old gentleman well known to the medical service. He has been here for 7 days. He came in not feeling well with evidence for tunneled catheter malfunction and infection. He does have a history of uncontrolled hypertension, chronic anemia of kidney disease, peripheral vascular disease. He has a previous below-knee amputation. He was admitted for antibiotics. The tunneled catheter was ultimately discontinued and he had a temporary catheter put in for dialysis. Infectious Disease was consulted. He was followed by their team as well as Nephrology. His blood pressure medications were adjusted accordingly and he was continued on his treatment of Renvela and calcitriol here. The patient has been stable, awaiting replacement of the [facility] this week; it was done this morning. He tolerated that well. He is currently dialyzing, and if things are aligned for his continued antibiotics at dialysis, then he can discharge this evening. Today's metabolic panel shows normal electrolytes, BUN and creatinine are consistent with end-stage renal disease, his glucose is 77. Blood pressure last noted to be 174/84, pulse 80, respiratory rate 18, SaO2 is 98%. He has no acute complaints. He has been resting comfortably this morning.   Cardiac

## 2023-07-21 NOTE — DIALYSIS
TREATMENT SUMMARY     Patient dialyzed in room 326  Tolerated treatment well without complaint or complications   RIJ TDC functioning well without complication with  BFR or accessing   BFR  350-400  DFR  800  2500 ml removed via UF with a with a net removal 2000 ml  Report given by Gordo Brand to Ildefonso Palomino, SANIA verified all questions answered by DANIEL Woody     TREATMENT NOTES      Hemodialysis initiation and data  collection by Gordo Brand

## 2023-07-21 NOTE — OR NURSING
TRANSFER - OUT REPORT:    Verbal report given to [facility] on Ada Burgos  being transferred to [facility] for routine progression of patient care       Report consisted of patient's Situation, Background, Assessment and   Recommendations(SBAR). Information from the following report(s) Nurse Handoff Report, MAR, and Event Log was reviewed with the receiving nurse. Lines:   Peripheral IV 07/19/23 Right; Anterior;Proximal Basilic (Active)   Site Assessment Clean, dry & intact 07/20/23 0300   Line Status Capped 07/20/23 0300   Line Care Connections checked and tightened 07/20/23 0300   Phlebitis Assessment No symptoms 07/20/23 0300   Infiltration Assessment 0 07/20/23 0300   Alcohol Cap Used Yes 07/20/23 0300   Dressing Status Clean, dry & intact 07/20/23 0300   Dressing Type Transparent 07/20/23 0300       Peripheral IV 07/20/23 Right; Anterior Forearm (Active)   Site Assessment Clean, dry & intact 07/20/23 2100   Line Status Flushed;Capped 07/20/23 2100   Line Care Ports disinfected;Cap changed 07/20/23 2100   Phlebitis Assessment No symptoms 07/20/23 2100   Infiltration Assessment 0 07/20/23 2100   Alcohol Cap Used Yes 07/20/23 2100   Dressing Status Clean, dry & intact 07/20/23 2100   Dressing Type Transparent 07/20/23 2100       Tunneled Hemodialysis Catheter Right Subclavian (Active)       Triple Lumen Hemodialysis Catheter (Trialysis) Right Femoral vein (Active)   Continued need for line? Yes 07/20/23 0300   Site Assessment Clean, dry & intact 07/20/23 0300   CVC Lumen Status Capped 07/20/23 0300   Venous Lumen Status Blood return noted; Flushed 07/19/23 1925   Arterial Lumen Status Brisk blood return;Flushed 07/19/23 1925   Alcohol Cap Used Yes 07/20/23 0300   Line Care Connections checked and tightened 07/20/23 0300   Dressing Type Antimicrobial;Transparent; Bacteriocidal 07/20/23 0300   Date of Last Dressing Change 07/17/23 07/19/23 1925   Dressing Status Clean, dry & intact 07/20/23 0300   Dressing

## 2023-07-21 NOTE — OP NOTE
Operative Note      Patient: David Allen  YOB: 1970  MRN: [id number]    Date of Procedure: 07/21/23     Removal of central venous catheter right groin  Central venogram  Catheter selection of right internal jugular vein  Catheter selection of left subclavian vein  Catheter selection of right subclavian vein  Ultrasound guided right supraclavicular subclavian access  Placement of tunneled central venous access catheter  Radiographic supervision and interpretation    Post-Op Diagnosis:  ESRD on HD, Bacteremia         Anesthesia: 1% lidocaine, moderate sedation was administered by ANA San from 6505 until 7583Q 3 mg versed, 150 mCg fentanyl under my direct supervision    Estimated Blood Loss (mL): Minimal    Complications: None    Specimens: none    Findings: Central venogram with patent bilateral innominate veins. The right subclavian vein is patent to its midportion and attempts with wire and catheter to cross more peripherally into the right arm were not successful. The left subclavian vein is patent at the confluence with the innominate vein, however attempts with wire and catheter across peripherally into the left arm were not successful. The left internal jugular vein is occluded. The right internal jugular vein is patent near the subclavian vein confluence. Detailed Description of Procedure:   After ensuring that the appropriately signed documents were secured on the chart, the patient was taken to the Cath Lab and placed supine on the Cath Lab table. The bilateral groins were prepped and draped sterilely including the right femoral dialysis catheter. A timeout procedure was completed per institutional guidelines and agreed upon by all present. The catheter was clamped with a hemostat close to the skin. The ports were divided with a scissors and discarded. Local anesthetic was instilled in the skin and subcutaneous tissues around the catheter.   A Bentson wire was then advanced through the catheter and confirmed with fluoroscopy to be within the inferior vena cava. The catheter was removed and discarded. An 8 Palestinian sheath was inserted into the vein. Using a [employer] wire and Bertin catheter, the right internal jugular vein, right subclavian vein, and left subclavian veins were sequentially selected and venograms were obtained with findings as above. With demonstration of patent right internal jugular vein, decision was made to proceed with right sided tunneled dialysis catheter placement. The right neck and chest were prepped and draped sterilely. Under ultrasound, the proximal right subclavian vein was visualized to be patent. Local anesthetic was instilled the skin and subcutaneous tissues overlying the vein. The vein was accessed with a 21-gauge needle however the puncture wire nor the Nitrex wire would pass centrally. Next, the supraclavicular right subclavian was identified with ultrasound and found to be patent. Local anesthetic was instilled in the skin and Subcutaneous tissues overlying the vein which was then accessed with a 21-gauge needle. Nitric's wire was easily passed centrally and confirmed with fluoroscopy. Needle was exchanged for a micropuncture through which a Amplatz wire was advanced into the inferior vena cava. The tract was dilated over the Amplatz wire. Additional local anesthetic was instilled in the skin and subcutaneous tissues of the right chest.  Small incision was made in the right chest and the catheter was tunneled from the chest to the neck. The peel-away sheath was inserted into the vein over the wire. Wire and dilator were removed. The catheter was then inserted through the peel-away sheath into the vein and the sheath was removed. Fluoroscopic image confirmed no twists or kinks in the catheter. The neck incision was reapproximated with a subcuticular suture of Monocryl.   Monocryl was also used for a pursestring suture around the

## 2023-07-21 NOTE — CARE COORDINATION
D/c plan: D/c home today. Pt to transport himself home. CM spoke w/ pt. Discussed the d/c plan of d/c home w/ [facility] and IV abx w/ his dialysis as pt is MWF HD pt at [facility]. Pt verbalized an understanding and is in agreement. CMS confirmed w/ the pt's dialysis center that they have the IV abx order. Pt to transport himself home.

## 2023-07-21 NOTE — PLAN OF CARE
Problem: Chronic Conditions and Co-morbidities  Goal: Patient's chronic conditions and co-morbidity symptoms are monitored and maintained or improved  7/21/2023 1200 by Lois Vaughan RN  Outcome: Not Progressing  7/21/2023 0028 by Zulma Putnam RN  Outcome: Progressing     Problem: Chronic Conditions and Co-morbidities  Goal: Patient's chronic conditions and co-morbidity symptoms are monitored and maintained or improved  7/21/2023 1200 by Lois Vaughan RN  Outcome: Not Progressing  7/21/2023 0028 by Zulma Putnam RN  Outcome: Progressing

## 2023-07-22 NOTE — PROGRESS NOTES
Patient discharged in stable condition via wheelchair, after all instructions reviewed and opportunity for questions provided. IV discontinued with catheter tip intact. Escorted to car in stable condition.

## 2023-07-25 ENCOUNTER — HOME CARE VISIT (OUTPATIENT)
Age: 53
End: 2023-07-25

## 2023-07-27 ENCOUNTER — HOME CARE VISIT (OUTPATIENT)
Age: 53
End: 2023-07-27

## 2023-10-20 ENCOUNTER — HOSPITAL ENCOUNTER (INPATIENT)
Facility: HOSPITAL | Age: 53
LOS: 3 days | Discharge: HOME | DRG: 189 | End: 2023-10-24
Attending: STUDENT IN AN ORGANIZED HEALTH CARE EDUCATION/TRAINING PROGRAM | Admitting: FAMILY MEDICINE
Payer: MEDICARE

## 2023-10-20 ENCOUNTER — APPOINTMENT (OUTPATIENT)
Facility: HOSPITAL | Age: 53
DRG: 189 | End: 2023-10-20
Payer: MEDICARE

## 2023-10-20 DIAGNOSIS — R51.9 ACUTE NONINTRACTABLE HEADACHE, UNSPECIFIED HEADACHE TYPE: ICD-10-CM

## 2023-10-20 DIAGNOSIS — R60.9 EDEMA, UNSPECIFIED TYPE: ICD-10-CM

## 2023-10-20 DIAGNOSIS — J96.01 ACUTE RESPIRATORY FAILURE WITH HYPOXIA (HCC): Primary | ICD-10-CM

## 2023-10-20 LAB
ALBUMIN SERPL-MCNC: 3.3 G/DL (ref 3.4–5)
ALBUMIN/GLOB SERPL: 0.7 (ref 0.8–1.7)
ALP SERPL-CCNC: 96 U/L (ref 45–117)
ALT SERPL-CCNC: 63 U/L (ref 16–61)
ANION GAP BLD CALC-SCNC: ABNORMAL MMOL/L (ref 10–20)
ANION GAP SERPL CALC-SCNC: 7 MMOL/L (ref 3–18)
AST SERPL-CCNC: 29 U/L (ref 10–38)
BASE EXCESS BLDV CALC-SCNC: 4 MMOL/L
BASOPHILS # BLD: 0 K/UL (ref 0–0.1)
BASOPHILS NFR BLD: 0 % (ref 0–2)
BILIRUB SERPL-MCNC: 1.6 MG/DL (ref 0.2–1)
BUN SERPL-MCNC: 31 MG/DL (ref 7–18)
BUN/CREAT SERPL: 5 (ref 12–20)
CA-I BLD-MCNC: 1 MMOL/L (ref 1.12–1.32)
CALCIUM SERPL-MCNC: 8.3 MG/DL (ref 8.5–10.1)
CHLORIDE BLD-SCNC: 102 MMOL/L (ref 98–107)
CHLORIDE SERPL-SCNC: 101 MMOL/L (ref 100–111)
CO2 BLDV-SCNC: 27 MMOL/L (ref 22–30)
CO2 SERPL-SCNC: 29 MMOL/L (ref 21–32)
CREAT BLD-MCNC: 7.37 MG/DL (ref 0.6–1.3)
CREAT SERPL-MCNC: 6.36 MG/DL (ref 0.6–1.3)
DIFFERENTIAL METHOD BLD: ABNORMAL
EOSINOPHIL # BLD: 0.2 K/UL (ref 0–0.4)
EOSINOPHIL NFR BLD: 4 % (ref 0–5)
ERYTHROCYTE [DISTWIDTH] IN BLOOD BY AUTOMATED COUNT: 22.3 % (ref 11.6–14.5)
FLUAV RNA SPEC QL NAA+PROBE: NOT DETECTED
FLUBV RNA SPEC QL NAA+PROBE: NOT DETECTED
GLOBULIN SER CALC-MCNC: 4.5 G/DL (ref 2–4)
GLUCOSE BLD STRIP.AUTO-MCNC: 63 MG/DL (ref 74–106)
GLUCOSE SERPL-MCNC: 65 MG/DL (ref 74–99)
HCO3 BLDV-SCNC: 27.5 MMOL/L (ref 23–28)
HCT VFR BLD AUTO: 23.7 % (ref 36–48)
HGB BLD-MCNC: 8 G/DL (ref 13–16)
IMM GRANULOCYTES # BLD AUTO: 0 K/UL (ref 0–0.04)
IMM GRANULOCYTES NFR BLD AUTO: 0 % (ref 0–0.5)
LYMPHOCYTES # BLD: 0.7 K/UL (ref 0.9–3.6)
LYMPHOCYTES NFR BLD: 15 % (ref 21–52)
MCH RBC QN AUTO: 26.4 PG (ref 24–34)
MCHC RBC AUTO-ENTMCNC: 33.8 G/DL (ref 31–37)
MCV RBC AUTO: 78.2 FL (ref 78–100)
MONOCYTES # BLD: 0.7 K/UL (ref 0.05–1.2)
MONOCYTES NFR BLD: 14 % (ref 3–10)
NEUTS SEG # BLD: 3.1 K/UL (ref 1.8–8)
NEUTS SEG NFR BLD: 67 % (ref 40–73)
NRBC # BLD: 0 K/UL (ref 0–0.01)
NRBC BLD-RTO: 0 PER 100 WBC
PCO2 BLDV: 36.4 MMHG (ref 41–51)
PH BLDV: 7.49 (ref 7.32–7.42)
PLATELET # BLD AUTO: 129 K/UL (ref 135–420)
PMV BLD AUTO: 9.3 FL (ref 9.2–11.8)
PO2 BLDV: 37 MMHG (ref 25–40)
POTASSIUM BLD-SCNC: 3.8 MMOL/L (ref 3.5–5.5)
POTASSIUM SERPL-SCNC: 3.3 MMOL/L (ref 3.5–5.5)
PROT SERPL-MCNC: 7.8 G/DL (ref 6.4–8.2)
RBC # BLD AUTO: 3.03 M/UL (ref 4.35–5.65)
RBC MORPH BLD: ABNORMAL
SAO2 % BLDV: 75 % (ref 65–88)
SARS-COV-2 RNA RESP QL NAA+PROBE: NOT DETECTED
SERVICE CMNT-IMP: ABNORMAL
SODIUM BLD-SCNC: 138 MMOL/L (ref 136–145)
SODIUM SERPL-SCNC: 137 MMOL/L (ref 136–145)
SPECIMEN TYPE: ABNORMAL
TROPONIN I SERPL HS-MCNC: 35 NG/L (ref 0–78)
WBC # BLD AUTO: 4.7 K/UL (ref 4.6–13.2)

## 2023-10-20 PROCEDURE — 82947 ASSAY GLUCOSE BLOOD QUANT: CPT

## 2023-10-20 PROCEDURE — 82330 ASSAY OF CALCIUM: CPT

## 2023-10-20 PROCEDURE — 6370000000 HC RX 637 (ALT 250 FOR IP): Performed by: STUDENT IN AN ORGANIZED HEALTH CARE EDUCATION/TRAINING PROGRAM

## 2023-10-20 PROCEDURE — 84132 ASSAY OF SERUM POTASSIUM: CPT

## 2023-10-20 PROCEDURE — 84484 ASSAY OF TROPONIN QUANT: CPT

## 2023-10-20 PROCEDURE — 87636 SARSCOV2 & INF A&B AMP PRB: CPT

## 2023-10-20 PROCEDURE — 93005 ELECTROCARDIOGRAM TRACING: CPT | Performed by: STUDENT IN AN ORGANIZED HEALTH CARE EDUCATION/TRAINING PROGRAM

## 2023-10-20 PROCEDURE — 82435 ASSAY OF BLOOD CHLORIDE: CPT

## 2023-10-20 PROCEDURE — 71045 X-RAY EXAM CHEST 1 VIEW: CPT

## 2023-10-20 PROCEDURE — 85025 COMPLETE CBC W/AUTO DIFF WBC: CPT

## 2023-10-20 PROCEDURE — 99285 EMERGENCY DEPT VISIT HI MDM: CPT

## 2023-10-20 PROCEDURE — 84295 ASSAY OF SERUM SODIUM: CPT

## 2023-10-20 PROCEDURE — 80053 COMPREHEN METABOLIC PANEL: CPT

## 2023-10-20 PROCEDURE — 84520 ASSAY OF UREA NITROGEN: CPT

## 2023-10-20 PROCEDURE — 82803 BLOOD GASES ANY COMBINATION: CPT

## 2023-10-20 RX ORDER — PROCHLORPERAZINE MALEATE 10 MG
10 TABLET ORAL
Status: COMPLETED | OUTPATIENT
Start: 2023-10-20 | End: 2023-10-20

## 2023-10-20 RX ORDER — ACETAMINOPHEN 325 MG/1
650 TABLET ORAL
Status: COMPLETED | OUTPATIENT
Start: 2023-10-20 | End: 2023-10-20

## 2023-10-20 RX ORDER — IBUPROFEN 600 MG/1
600 TABLET ORAL
Status: COMPLETED | OUTPATIENT
Start: 2023-10-20 | End: 2023-10-20

## 2023-10-20 RX ADMIN — ACETAMINOPHEN 650 MG: 325 TABLET ORAL at 20:26

## 2023-10-20 RX ADMIN — IBUPROFEN 600 MG: 600 TABLET, FILM COATED ORAL at 20:27

## 2023-10-20 RX ADMIN — PROCHLORPERAZINE MALEATE 10 MG: 10 TABLET, FILM COATED ORAL at 20:27

## 2023-10-20 ASSESSMENT — PAIN DESCRIPTION - LOCATION: LOCATION: HEAD

## 2023-10-20 ASSESSMENT — ENCOUNTER SYMPTOMS
DIARRHEA: 0
FACIAL SWELLING: 0
ABDOMINAL PAIN: 0
BACK PAIN: 0
CONSTIPATION: 0
EYE PAIN: 0
CHEST TIGHTNESS: 0
BLOOD IN STOOL: 0
ABDOMINAL DISTENTION: 0
SHORTNESS OF BREATH: 0

## 2023-10-20 ASSESSMENT — PAIN SCALES - GENERAL
PAINLEVEL_OUTOF10: 7
PAINLEVEL_OUTOF10: 0

## 2023-10-20 ASSESSMENT — LIFESTYLE VARIABLES
HOW OFTEN DO YOU HAVE A DRINK CONTAINING ALCOHOL: NEVER
HOW MANY STANDARD DRINKS CONTAINING ALCOHOL DO YOU HAVE ON A TYPICAL DAY: PATIENT DOES NOT DRINK

## 2023-10-20 ASSESSMENT — PAIN - FUNCTIONAL ASSESSMENT: PAIN_FUNCTIONAL_ASSESSMENT: 0-10

## 2023-10-20 NOTE — ED NOTES
Pt reports to ed with complaint of headache, pt moved from ng H into room 15- pt asleep at time of transfer when RN woke pt from sleep pt reports headache is \"gone\" and rates pain as controlled at 0 out of 10, pt imelda additional symptoms, imelda hx of migraines, pt reports headache started post dialysis and that event is not historic, pt AOX4, pt reports he is hungry and tired,      Rj Leroy, RN  10/20/23 1943

## 2023-10-20 NOTE — ED NOTES
EMERGENCY DEPARTMENT HISTORY AND PHYSICAL EXAM    4:49 AM      Date: 10/20/2023  Patient Name: Yousif Mota    History of Presenting Illness     Chief Complaint   Patient presents with    Headache       History From: Patient  HPI  79-year-old male history of ESRD Monday Wednesday Friday, right BKA, hypertension, sickle cell, CHF who presents with headache. Patient states he was undergoing dialysis when he developed worsening headache and was told that his blood pressure was elevated. States that he slept and his headache started to resolve, however systolic blood pressure was in the 200s and he was told by the facility to come to the ED for further management. Denies any changes in meds, changes in diet, sick contact, or any other precipitating factors. When assessing ROS he denies any focal neurological deficits, nausea, vomiting, chest pain, shortness of breath, abdominal pain, or any other changes. Nursing Notes were all reviewed and agreed with or any disagreements were addressed in the HPI.     PCP: Severo Crosser, MD    Current Facility-Administered Medications   Medication Dose Route Frequency Provider Last Rate Last Admin    sodium chloride flush 0.9 % injection 5-40 mL  5-40 mL IntraVENous 2 times per day Arturo Goldstein MD        sodium chloride flush 0.9 % injection 5-40 mL  5-40 mL IntraVENous PRN Arturo Goldstein MD        0.9 % sodium chloride infusion   IntraVENous PRN Arturo Goldstein MD        enoxaparin (LOVENOX) injection 40 mg  40 mg SubCUTAneous Daily Arturo Goldstein MD        ondansetron (ZOFRAN-ODT) disintegrating tablet 4 mg  4 mg Oral Q8H PRN Arturo Goldstein MD        Or    ondansetron (ZOFRAN) injection 4 mg  4 mg IntraVENous Q6H PRN Arturo Goldstein MD        polyethylene glycol (GLYCOLAX) packet 17 g  17 g Oral Daily PRN Arturo Goldstein MD        acetaminophen (TYLENOL) tablet 650 mg  650 mg Oral Q6H PRN Baldomero Flores MD        Or

## 2023-10-21 PROBLEM — R09.02 HYPOXIA: Status: RESOLVED | Noted: 2023-05-08 | Resolved: 2023-10-21

## 2023-10-21 PROBLEM — J96.01 ACUTE RESPIRATORY FAILURE WITH HYPOXIA (HCC): Status: ACTIVE | Noted: 2023-10-21

## 2023-10-21 LAB
GLUCOSE BLD STRIP.AUTO-MCNC: 100 MG/DL (ref 70–110)
GLUCOSE BLD STRIP.AUTO-MCNC: 116 MG/DL (ref 70–110)
GLUCOSE BLD STRIP.AUTO-MCNC: 120 MG/DL (ref 70–110)
GLUCOSE BLD STRIP.AUTO-MCNC: 97 MG/DL (ref 70–110)

## 2023-10-21 PROCEDURE — 82962 GLUCOSE BLOOD TEST: CPT

## 2023-10-21 PROCEDURE — 1100000000 HC RM PRIVATE

## 2023-10-21 PROCEDURE — 90935 HEMODIALYSIS ONE EVALUATION: CPT

## 2023-10-21 PROCEDURE — 36556 INSERT NON-TUNNEL CV CATH: CPT

## 2023-10-21 PROCEDURE — 6370000000 HC RX 637 (ALT 250 FOR IP): Performed by: FAMILY MEDICINE

## 2023-10-21 PROCEDURE — 2700000000 HC OXYGEN THERAPY PER DAY

## 2023-10-21 PROCEDURE — 2580000003 HC RX 258: Performed by: FAMILY MEDICINE

## 2023-10-21 PROCEDURE — 5A1D70Z PERFORMANCE OF URINARY FILTRATION, INTERMITTENT, LESS THAN 6 HOURS PER DAY: ICD-10-PCS | Performed by: STUDENT IN AN ORGANIZED HEALTH CARE EDUCATION/TRAINING PROGRAM

## 2023-10-21 PROCEDURE — 6370000000 HC RX 637 (ALT 250 FOR IP): Performed by: STUDENT IN AN ORGANIZED HEALTH CARE EDUCATION/TRAINING PROGRAM

## 2023-10-21 PROCEDURE — 6360000002 HC RX W HCPCS: Performed by: FAMILY MEDICINE

## 2023-10-21 RX ORDER — LABETALOL 200 MG/1
200 TABLET, FILM COATED ORAL 2 TIMES DAILY
Status: DISCONTINUED | OUTPATIENT
Start: 2023-10-21 | End: 2023-10-24 | Stop reason: HOSPADM

## 2023-10-21 RX ORDER — POLYETHYLENE GLYCOL 3350 17 G/17G
17 POWDER, FOR SOLUTION ORAL DAILY PRN
Status: DISCONTINUED | OUTPATIENT
Start: 2023-10-21 | End: 2023-10-24 | Stop reason: HOSPADM

## 2023-10-21 RX ORDER — SEVELAMER CARBONATE 800 MG/1
1600 TABLET, FILM COATED ORAL
Status: DISCONTINUED | OUTPATIENT
Start: 2023-10-21 | End: 2023-10-24 | Stop reason: HOSPADM

## 2023-10-21 RX ORDER — SODIUM CHLORIDE 0.9 % (FLUSH) 0.9 %
5-40 SYRINGE (ML) INJECTION EVERY 12 HOURS SCHEDULED
Status: DISCONTINUED | OUTPATIENT
Start: 2023-10-21 | End: 2023-10-24 | Stop reason: HOSPADM

## 2023-10-21 RX ORDER — SODIUM CHLORIDE 0.9 % (FLUSH) 0.9 %
5-40 SYRINGE (ML) INJECTION PRN
Status: DISCONTINUED | OUTPATIENT
Start: 2023-10-21 | End: 2023-10-24 | Stop reason: HOSPADM

## 2023-10-21 RX ORDER — SODIUM CHLORIDE 9 MG/ML
INJECTION, SOLUTION INTRAVENOUS PRN
Status: DISCONTINUED | OUTPATIENT
Start: 2023-10-21 | End: 2023-10-24 | Stop reason: HOSPADM

## 2023-10-21 RX ORDER — LOSARTAN POTASSIUM 50 MG/1
100 TABLET ORAL DAILY
Status: DISCONTINUED | OUTPATIENT
Start: 2023-10-21 | End: 2023-10-24 | Stop reason: HOSPADM

## 2023-10-21 RX ORDER — ACETAMINOPHEN 650 MG/1
650 SUPPOSITORY RECTAL EVERY 6 HOURS PRN
Status: DISCONTINUED | OUTPATIENT
Start: 2023-10-21 | End: 2023-10-24 | Stop reason: HOSPADM

## 2023-10-21 RX ORDER — CALCITRIOL 0.25 UG/1
0.5 CAPSULE, LIQUID FILLED ORAL DAILY
Status: DISCONTINUED | OUTPATIENT
Start: 2023-10-21 | End: 2023-10-24 | Stop reason: HOSPADM

## 2023-10-21 RX ORDER — ACETAMINOPHEN 325 MG/1
650 TABLET ORAL EVERY 6 HOURS PRN
Status: DISCONTINUED | OUTPATIENT
Start: 2023-10-21 | End: 2023-10-24 | Stop reason: HOSPADM

## 2023-10-21 RX ORDER — CINACALCET 30 MG/1
30 TABLET, FILM COATED ORAL DAILY
Status: DISCONTINUED | OUTPATIENT
Start: 2023-10-21 | End: 2023-10-24 | Stop reason: HOSPADM

## 2023-10-21 RX ORDER — LANOLIN ALCOHOL/MO/W.PET/CERES
1000 CREAM (GRAM) TOPICAL DAILY
Status: DISCONTINUED | OUTPATIENT
Start: 2023-10-21 | End: 2023-10-24 | Stop reason: HOSPADM

## 2023-10-21 RX ORDER — AMLODIPINE BESYLATE 5 MG/1
10 TABLET ORAL DAILY
Status: DISCONTINUED | OUTPATIENT
Start: 2023-10-21 | End: 2023-10-24 | Stop reason: HOSPADM

## 2023-10-21 RX ORDER — ONDANSETRON 2 MG/ML
4 INJECTION INTRAMUSCULAR; INTRAVENOUS EVERY 6 HOURS PRN
Status: DISCONTINUED | OUTPATIENT
Start: 2023-10-21 | End: 2023-10-24 | Stop reason: HOSPADM

## 2023-10-21 RX ORDER — ENOXAPARIN SODIUM 100 MG/ML
40 INJECTION SUBCUTANEOUS DAILY
Status: DISCONTINUED | OUTPATIENT
Start: 2023-10-21 | End: 2023-10-23 | Stop reason: ALTCHOICE

## 2023-10-21 RX ORDER — ONDANSETRON 4 MG/1
4 TABLET, ORALLY DISINTEGRATING ORAL EVERY 8 HOURS PRN
Status: DISCONTINUED | OUTPATIENT
Start: 2023-10-21 | End: 2023-10-24 | Stop reason: HOSPADM

## 2023-10-21 RX ORDER — HYDRALAZINE HYDROCHLORIDE 50 MG/1
100 TABLET, FILM COATED ORAL EVERY 8 HOURS SCHEDULED
Status: DISCONTINUED | OUTPATIENT
Start: 2023-10-21 | End: 2023-10-24 | Stop reason: HOSPADM

## 2023-10-21 RX ORDER — SEVELAMER CARBONATE 800 MG/1
800 TABLET, FILM COATED ORAL
Status: DISCONTINUED | OUTPATIENT
Start: 2023-10-21 | End: 2023-10-21

## 2023-10-21 RX ADMIN — SEVELAMER CARBONATE 800 MG: 800 TABLET, FILM COATED ORAL at 11:57

## 2023-10-21 RX ADMIN — HYDRALAZINE HYDROCHLORIDE 100 MG: 50 TABLET, FILM COATED ORAL at 06:59

## 2023-10-21 RX ADMIN — CYANOCOBALAMIN TAB 1000 MCG 1000 MCG: 1000 TAB at 08:51

## 2023-10-21 RX ADMIN — ENOXAPARIN SODIUM 40 MG: 100 INJECTION SUBCUTANEOUS at 08:51

## 2023-10-21 RX ADMIN — SODIUM CHLORIDE, PRESERVATIVE FREE 10 ML: 5 INJECTION INTRAVENOUS at 08:52

## 2023-10-21 RX ADMIN — CALCITRIOL 0.5 MCG: 0.25 CAPSULE ORAL at 08:51

## 2023-10-21 RX ADMIN — LABETALOL HYDROCHLORIDE 200 MG: 200 TABLET, FILM COATED ORAL at 08:50

## 2023-10-21 RX ADMIN — SEVELAMER CARBONATE 800 MG: 800 TABLET, FILM COATED ORAL at 08:50

## 2023-10-21 RX ADMIN — CINACALCET HYDROCHLORIDE 30 MG: 30 TABLET, FILM COATED ORAL at 13:28

## 2023-10-21 RX ADMIN — LOSARTAN POTASSIUM 100 MG: 50 TABLET, FILM COATED ORAL at 08:50

## 2023-10-21 RX ADMIN — LABETALOL HYDROCHLORIDE 200 MG: 200 TABLET, FILM COATED ORAL at 21:28

## 2023-10-21 RX ADMIN — AMLODIPINE BESYLATE 10 MG: 5 TABLET ORAL at 08:51

## 2023-10-21 RX ADMIN — SODIUM CHLORIDE, PRESERVATIVE FREE 10 ML: 5 INJECTION INTRAVENOUS at 21:40

## 2023-10-21 RX ADMIN — SEVELAMER CARBONATE 1600 MG: 800 TABLET, FILM COATED ORAL at 17:12

## 2023-10-21 ASSESSMENT — PAIN SCALES - GENERAL
PAINLEVEL_OUTOF10: 0
PAINLEVEL_OUTOF10: 0

## 2023-10-21 NOTE — ED NOTES
Pt sleeping desated to 83% O2SAT on room air, pt placed on 3L nasal cannula and returns to 97% O2SAT, pt desates with attempt to ween pt O2 to room air, in process to update physician      Agueda Henao RN  10/20/23 2056

## 2023-10-21 NOTE — DIALYSIS
10/21/23  Dialysis treatment  Code Status- full  Diagnosis-Respiratory Failure    Treatment time-4hrs  Fluid removed-3.5kg  BVP-90.5  Medications given-none  Hepatitis Status-7/14/23 AB-IMM, AG- NEG    Access-  RT CVC, dressing changed, no s/s of infection noted. Area cleaned, ports cleaned, blood aspirated and lines flushed without any issues. Good blood flow noted    Treatment-  1408 Dialysis started  1808 Dialysis completed and blood rinsed back.  New caps placed on each port, pt stable    Pre and Post Report given  Everardo Colvin RN

## 2023-10-21 NOTE — CONSULTS
RENAL CONSULT  10/21/2023    Patient:  Jenny Marshall  :  1970  Gender:  male  MRN #:  [id number]    Reason for Consult: Pulmonary edema, high Bp and ESRD     History of Present Illness:    Jenny Marshall is a 48y.o. year old male   ESRD on HD,  DM-2, anemia, uncontrolled hypertension , hyperphosphatemia non compliance to dialysis prescription, dietary/life style modification was sent from HD unit on 10/20 for hypertensive urgency and shortness of breath post HD     CXR showed- interstitial edema , requiring oxygen now , BP is elevated  When is aw him this morning he complaint of shortness of breath , no headache and chest pain, no other concern         Past Medical History:   Diagnosis Date    Chronic kidney disease     Diabetes ([address])     Heart failure ([address])     Hemodialysis patient ([address])     Hypertension     Psychiatric illness 2023    Sickle cell trait ([address])      Past Surgical History:   Procedure Laterality Date    IR NONTUNNELED VASCULAR CATHETER  2022    IR NONTUNNELED VASCULAR CATHETER 2022 [facility]    IR NONTUNNELED VASCULAR CATHETER  2022    IR NONTUNNELED VASCULAR CATHETER  2023    IR NONTUNNELED VASCULAR CATHETER 2023 [facility]    IR THRMB/INFUSION DIAYSIS CIRCUIT  6/3/2022    IR THRMB/INFUSION DIAYSIS CIRCUIT 6/3/2022 [facility]    IR THRMB/INFUSION DIAYSIS CIRCUIT  6/3/2022    IR TUNNELED CATHETER PLACEMENT GREATER THAN 5 YEARS  2022    IR TUNNELED CATHETER PLACEMENT GREATER THAN 5 YEARS 2022 [facility]    IR TUNNELED CATHETER PLACEMENT GREATER THAN 5 YEARS  2022    IR TUNNELED CATHETER PLACEMENT GREATER THAN 5 YEARS  2023    IR TUNNELED CATHETER PLACEMENT GREATER THAN 5 YEARS 2023 [facility]    IR TUNNELED CATHETER PLACEMENT GREATER THAN 5 YEARS  2023    IR TUNNELED CATHETER PLACEMENT GREATER THAN 5 YEARS 2023 Omar Wooten MD [facility]    ORTHOPEDIC SURGERY      right BKA 2017    XR

## 2023-10-21 NOTE — ED NOTES
Pt sleeping desated to 83% O2SAT on room air, pt on 3L nasal cannula and returns to 97% O2SAT, pt desates with attempt to ween pt O2 to room air, physician notified       Ignacia Morgan [place]  10/20/23 2056

## 2023-10-21 NOTE — ED NOTES
Pt straight cathed per order pt tolerated poorly, pt curled into fetal position during insertion and would not allow RN to advance cath, pt educated multiple times to lay back, pt did not correct position, unsuccessful attempt to obtain UA sample      Rico Baker RN  10/21/23 8734

## 2023-10-21 NOTE — PLAN OF CARE
Problem: Chronic Conditions and Co-morbidities  Goal: Patient's chronic conditions and co-morbidity symptoms are monitored and maintained or improved  Outcome: Progressing  Note: Receiving dialysis today

## 2023-10-21 NOTE — ED NOTES
RN attempted to ween pt to room air, pt maintained O2SAT at 92% on 2L, pt desated to 83% on room air, pt returned to 3L nasal cannula, pt O2SAT 94% with 3L nasal cannula      Lyle Garcia RN  10/20/23 5265

## 2023-10-21 NOTE — ED NOTES
V/O MD Solano to cxl EJ order as pt has IV access in R arm at this time.       Peggy Tripp RN  10/21/23 6149

## 2023-10-21 NOTE — ED NOTES
Report called to Med-Surg receiving RN, accepting RN imelda additional questions at this time, will continue to monitor pt at this time      Mirna Wayne  10/21/23 9077

## 2023-10-21 NOTE — ED NOTES
Pt provided urinal r/t collection of ordered labs, pt educated on purpose of ordered labs, will continue to monitor pt      Lizbeth Lima, [address]  10/21/23 5659

## 2023-10-21 NOTE — H&P
normal.   Pulses: 2+ and symmetric all extremities. Skin: Skin color pink/pale/mottled/flushed, turgor normal. No rashes or lesions   Neurologic: CNII-XII intact. No focal motor or sensory deficit.        Labs Reviewed:    Recent Results (from the past 24 hour(s))   EKG 12 Lead    Collection Time: 10/20/23  8:28 PM   Result Value Ref Range    Ventricular Rate 84 BPM    Atrial Rate 84 BPM    P-R Interval 154 ms    QRS Duration 94 ms    Q-T Interval 442 ms    QTc Calculation (Bazett) 522 ms    P Axis 81 degrees    R Axis 111 degrees    T Axis 50 degrees    Diagnosis       Normal sinus rhythm  Possible Left atrial enlargement  Right axis deviation  Prolonged QT  Abnormal ECG  When compared with ECG of 14-JUL-2023 13:53,  QRS axis shifted right  T wave inversion no longer evident in Lateral leads     CBC with Auto Differential    Collection Time: 10/20/23  8:40 PM   Result Value Ref Range    WBC 4.7 4.6 - 13.2 K/uL    RBC 3.03 (L) 4.35 - 5.65 M/uL    Hemoglobin 8.0 (L) 13.0 - 16.0 g/dL    Hematocrit 23.7 (L) 36.0 - 48.0 %    MCV 78.2 78.0 - 100.0 FL    MCH 26.4 24.0 - 34.0 PG    MCHC 33.8 31.0 - 37.0 g/dL    RDW 22.3 (H) 11.6 - 14.5 %    Platelets 487 (L) 668 - 420 K/uL    MPV 9.3 9.2 - 11.8 FL    Nucleated RBCs 0.0 0  WBC    nRBC 0.00 0.00 - 0.01 K/uL    Neutrophils % 67 40 - 73 %    Lymphocytes % 15 (L) 21 - 52 %    Monocytes % 14 (H) 3 - 10 %    Eosinophils % 4 0 - 5 %    Basophils % 0 0 - 2 %    Immature Granulocytes 0 0.0 - 0.5 %    Neutrophils Absolute 3.1 1.8 - 8.0 K/UL    Lymphocytes Absolute 0.7 (L) 0.9 - 3.6 K/UL    Monocytes Absolute 0.7 0.05 - 1.2 K/UL    Eosinophils Absolute 0.2 0.0 - 0.4 K/UL    Basophils Absolute 0.0 0.0 - 0.1 K/UL    Absolute Immature Granulocyte 0.0 0.00 - 0.04 K/UL    Differential Type AUTOMATED      RBC Comment ANISOCYTOSIS  1+       CMP    Collection Time: 10/20/23  8:40 PM   Result Value Ref Range    Sodium 137 136 - 145 mmol/L    Potassium 3.3 (L) 3.5 - 5.5 mmol/L

## 2023-10-21 NOTE — PLAN OF CARE
Problem: Safety - Adult  Goal: Free from fall injury  10/21/2023 1424 by Sherryle Masters, RN  Outcome: Progressing  10/21/2023 0608 by Bree Bearden RN  Outcome: Progressing     Problem: Pain  Goal: Verbalizes/displays adequate comfort level or baseline comfort level  10/21/2023 1424 by Sherryle Masters, RN  Outcome: Progressing  10/21/2023 0608 by Bree Bearden RN  Outcome: Progressing

## 2023-10-21 NOTE — ED NOTES
Pt unable to provide urine sample with urinal, physician notified, will continue to monitor pt      Cesar Shepherd RN  10/21/23 8912

## 2023-10-22 LAB
ANION GAP SERPL CALC-SCNC: 6 MMOL/L (ref 3–18)
BASOPHILS # BLD: 0 K/UL (ref 0–0.1)
BASOPHILS NFR BLD: 1 % (ref 0–2)
BUN SERPL-MCNC: 23 MG/DL (ref 7–18)
BUN/CREAT SERPL: 5 (ref 12–20)
CALCIUM SERPL-MCNC: 8 MG/DL (ref 8.5–10.1)
CHLORIDE SERPL-SCNC: 102 MMOL/L (ref 100–111)
CO2 SERPL-SCNC: 29 MMOL/L (ref 21–32)
CREAT SERPL-MCNC: 5.06 MG/DL (ref 0.6–1.3)
DIFFERENTIAL METHOD BLD: ABNORMAL
EKG ATRIAL RATE: 84 BPM
EKG DIAGNOSIS: NORMAL
EKG P AXIS: 81 DEGREES
EKG P-R INTERVAL: 154 MS
EKG Q-T INTERVAL: 442 MS
EKG QRS DURATION: 94 MS
EKG QTC CALCULATION (BAZETT): 522 MS
EKG R AXIS: 111 DEGREES
EKG T AXIS: 50 DEGREES
EKG VENTRICULAR RATE: 84 BPM
EOSINOPHIL # BLD: 0.2 K/UL (ref 0–0.4)
EOSINOPHIL NFR BLD: 3 % (ref 0–5)
ERYTHROCYTE [DISTWIDTH] IN BLOOD BY AUTOMATED COUNT: 21.8 % (ref 11.6–14.5)
GLUCOSE BLD STRIP.AUTO-MCNC: 102 MG/DL (ref 70–110)
GLUCOSE BLD STRIP.AUTO-MCNC: 110 MG/DL (ref 70–110)
GLUCOSE BLD STRIP.AUTO-MCNC: 92 MG/DL (ref 70–110)
GLUCOSE BLD STRIP.AUTO-MCNC: 97 MG/DL (ref 70–110)
GLUCOSE SERPL-MCNC: 75 MG/DL (ref 74–99)
HCT VFR BLD AUTO: 27.8 % (ref 36–48)
HGB BLD-MCNC: 9.2 G/DL (ref 13–16)
IMM GRANULOCYTES # BLD AUTO: 0 K/UL (ref 0–0.04)
IMM GRANULOCYTES NFR BLD AUTO: 0 % (ref 0–0.5)
LYMPHOCYTES # BLD: 0.6 K/UL (ref 0.9–3.6)
LYMPHOCYTES NFR BLD: 12 % (ref 21–52)
MCH RBC QN AUTO: 26.4 PG (ref 24–34)
MCHC RBC AUTO-ENTMCNC: 33.1 G/DL (ref 31–37)
MCV RBC AUTO: 79.9 FL (ref 78–100)
MONOCYTES # BLD: 1.1 K/UL (ref 0.05–1.2)
MONOCYTES NFR BLD: 22 % (ref 3–10)
NEUTS SEG # BLD: 2.9 K/UL (ref 1.8–8)
NEUTS SEG NFR BLD: 61 % (ref 40–73)
NRBC # BLD: 0 K/UL (ref 0–0.01)
NRBC BLD-RTO: 0 PER 100 WBC
PLATELET # BLD AUTO: 144 K/UL (ref 135–420)
PMV BLD AUTO: 10 FL (ref 9.2–11.8)
POTASSIUM SERPL-SCNC: 4.2 MMOL/L (ref 3.5–5.5)
RBC # BLD AUTO: 3.48 M/UL (ref 4.35–5.65)
SODIUM SERPL-SCNC: 137 MMOL/L (ref 136–145)
WBC # BLD AUTO: 4.8 K/UL (ref 4.6–13.2)

## 2023-10-22 PROCEDURE — 36415 COLL VENOUS BLD VENIPUNCTURE: CPT

## 2023-10-22 PROCEDURE — 6370000000 HC RX 637 (ALT 250 FOR IP): Performed by: STUDENT IN AN ORGANIZED HEALTH CARE EDUCATION/TRAINING PROGRAM

## 2023-10-22 PROCEDURE — 82962 GLUCOSE BLOOD TEST: CPT

## 2023-10-22 PROCEDURE — 2580000003 HC RX 258: Performed by: FAMILY MEDICINE

## 2023-10-22 PROCEDURE — 1100000000 HC RM PRIVATE

## 2023-10-22 PROCEDURE — 80048 BASIC METABOLIC PNL TOTAL CA: CPT

## 2023-10-22 PROCEDURE — 6370000000 HC RX 637 (ALT 250 FOR IP): Performed by: FAMILY MEDICINE

## 2023-10-22 PROCEDURE — 6360000002 HC RX W HCPCS: Performed by: FAMILY MEDICINE

## 2023-10-22 PROCEDURE — 85025 COMPLETE CBC W/AUTO DIFF WBC: CPT

## 2023-10-22 RX ADMIN — CYANOCOBALAMIN TAB 1000 MCG 1000 MCG: 1000 TAB at 09:44

## 2023-10-22 RX ADMIN — CINACALCET HYDROCHLORIDE 30 MG: 30 TABLET, FILM COATED ORAL at 09:43

## 2023-10-22 RX ADMIN — LABETALOL HYDROCHLORIDE 200 MG: 200 TABLET, FILM COATED ORAL at 09:44

## 2023-10-22 RX ADMIN — HYDRALAZINE HYDROCHLORIDE 100 MG: 50 TABLET, FILM COATED ORAL at 13:00

## 2023-10-22 RX ADMIN — AMLODIPINE BESYLATE 10 MG: 5 TABLET ORAL at 09:44

## 2023-10-22 RX ADMIN — SODIUM CHLORIDE, PRESERVATIVE FREE 10 ML: 5 INJECTION INTRAVENOUS at 09:46

## 2023-10-22 RX ADMIN — SEVELAMER CARBONATE 1600 MG: 800 TABLET, FILM COATED ORAL at 09:44

## 2023-10-22 RX ADMIN — SEVELAMER CARBONATE 1600 MG: 800 TABLET, FILM COATED ORAL at 16:45

## 2023-10-22 RX ADMIN — LOSARTAN POTASSIUM 100 MG: 50 TABLET, FILM COATED ORAL at 09:44

## 2023-10-22 RX ADMIN — CALCITRIOL 0.5 MCG: 0.25 CAPSULE ORAL at 09:44

## 2023-10-22 RX ADMIN — ENOXAPARIN SODIUM 40 MG: 100 INJECTION SUBCUTANEOUS at 09:44

## 2023-10-22 NOTE — PLAN OF CARE
Problem: Safety - Adult  Goal: Free from fall injury  10/22/2023 0222 by Priyanka Alfred RN  Outcome: Progressing  10/21/2023 1424 by Marvel Martinez RN  Outcome: Progressing     Problem: Pain  Goal: Verbalizes/displays adequate comfort level or baseline comfort level  10/22/2023 0222 by Priyanka Alfred RN  Outcome: Progressing  10/21/2023 1424 by Marvel Martinez RN  Outcome: Progressing

## 2023-10-23 LAB
ANION GAP SERPL CALC-SCNC: 9 MMOL/L (ref 3–18)
BUN SERPL-MCNC: 41 MG/DL (ref 7–18)
BUN/CREAT SERPL: 5 (ref 12–20)
CALCIUM SERPL-MCNC: 7.9 MG/DL (ref 8.5–10.1)
CHLORIDE SERPL-SCNC: 104 MMOL/L (ref 100–111)
CO2 SERPL-SCNC: 25 MMOL/L (ref 21–32)
CREAT SERPL-MCNC: 7.61 MG/DL (ref 0.6–1.3)
GLUCOSE BLD STRIP.AUTO-MCNC: 105 MG/DL (ref 70–110)
GLUCOSE BLD STRIP.AUTO-MCNC: 106 MG/DL (ref 70–110)
GLUCOSE BLD STRIP.AUTO-MCNC: 88 MG/DL (ref 70–110)
GLUCOSE BLD STRIP.AUTO-MCNC: 92 MG/DL (ref 70–110)
GLUCOSE BLD STRIP.AUTO-MCNC: 99 MG/DL (ref 70–110)
GLUCOSE SERPL-MCNC: 81 MG/DL (ref 74–99)
POTASSIUM SERPL-SCNC: 4.5 MMOL/L (ref 3.5–5.5)
SODIUM SERPL-SCNC: 138 MMOL/L (ref 136–145)

## 2023-10-23 PROCEDURE — 6370000000 HC RX 637 (ALT 250 FOR IP): Performed by: FAMILY MEDICINE

## 2023-10-23 PROCEDURE — 90935 HEMODIALYSIS ONE EVALUATION: CPT

## 2023-10-23 PROCEDURE — 36556 INSERT NON-TUNNEL CV CATH: CPT

## 2023-10-23 PROCEDURE — 1100000000 HC RM PRIVATE

## 2023-10-23 PROCEDURE — 82962 GLUCOSE BLOOD TEST: CPT

## 2023-10-23 PROCEDURE — 80048 BASIC METABOLIC PNL TOTAL CA: CPT

## 2023-10-23 PROCEDURE — 2580000003 HC RX 258: Performed by: FAMILY MEDICINE

## 2023-10-23 PROCEDURE — 6360000002 HC RX W HCPCS: Performed by: FAMILY MEDICINE

## 2023-10-23 PROCEDURE — 36415 COLL VENOUS BLD VENIPUNCTURE: CPT

## 2023-10-23 PROCEDURE — 6370000000 HC RX 637 (ALT 250 FOR IP): Performed by: STUDENT IN AN ORGANIZED HEALTH CARE EDUCATION/TRAINING PROGRAM

## 2023-10-23 RX ORDER — HEPARIN SODIUM 5000 [USP'U]/ML
5000 INJECTION, SOLUTION INTRAVENOUS; SUBCUTANEOUS EVERY 8 HOURS SCHEDULED
Status: DISCONTINUED | OUTPATIENT
Start: 2023-10-24 | End: 2023-10-24 | Stop reason: HOSPADM

## 2023-10-23 RX ADMIN — CINACALCET HYDROCHLORIDE 30 MG: 30 TABLET, FILM COATED ORAL at 09:27

## 2023-10-23 RX ADMIN — SODIUM CHLORIDE, PRESERVATIVE FREE 10 ML: 5 INJECTION INTRAVENOUS at 09:29

## 2023-10-23 RX ADMIN — ENOXAPARIN SODIUM 40 MG: 100 INJECTION SUBCUTANEOUS at 09:28

## 2023-10-23 RX ADMIN — SEVELAMER CARBONATE 1600 MG: 800 TABLET, FILM COATED ORAL at 11:30

## 2023-10-23 RX ADMIN — HYDRALAZINE HYDROCHLORIDE 100 MG: 50 TABLET, FILM COATED ORAL at 06:18

## 2023-10-23 RX ADMIN — CYANOCOBALAMIN TAB 1000 MCG 1000 MCG: 1000 TAB at 09:28

## 2023-10-23 RX ADMIN — POLYETHYLENE GLYCOL 3350 17 G: 17 POWDER, FOR SOLUTION ORAL at 21:42

## 2023-10-23 RX ADMIN — CALCITRIOL 0.5 MCG: 0.25 CAPSULE ORAL at 09:27

## 2023-10-23 RX ADMIN — SEVELAMER CARBONATE 1600 MG: 800 TABLET, FILM COATED ORAL at 17:31

## 2023-10-23 ASSESSMENT — PAIN SCALES - GENERAL: PAINLEVEL_OUTOF10: 0

## 2023-10-23 NOTE — PLAN OF CARE
Problem: Safety - Adult  Goal: Free from fall injury  10/23/2023 0321 by Geno Fox RN  Outcome: Progressing  10/22/2023 1322 by Kalia Allen RN  Outcome: Progressing     Problem: Pain  Goal: Verbalizes/displays adequate comfort level or baseline comfort level  10/23/2023 0321 by Geno Fox RN  Outcome: Progressing  10/22/2023 1322 by Kalia Allen RN  Outcome: Progressing

## 2023-10-23 NOTE — CARE COORDINATION
The SW spoke with the pt a the bedside in regards to the DC plan. The pt is going to DC home today. The pt was provided with a UAI and the phone number to find out who his assigned  is. The SW provided the pt with information on how to follow up about caregivers. The pt verbalized he understood. The pt stated he will need transportation back to the dialysis center, where his car is. The SW provided the RN with the address to the dialysis center for [employer]. The dialysis center was updated about the pt returning on Wed. The pt is aware and agreeable to the DC plan.

## 2023-10-24 VITALS
RESPIRATION RATE: 18 BRPM | BODY MASS INDEX: 25.47 KG/M2 | HEART RATE: 82 BPM | TEMPERATURE: 97.6 F | SYSTOLIC BLOOD PRESSURE: 112 MMHG | HEIGHT: 67 IN | DIASTOLIC BLOOD PRESSURE: 52 MMHG | OXYGEN SATURATION: 99 % | WEIGHT: 162.26 LBS

## 2023-10-24 PROBLEM — J96.01 ACUTE RESPIRATORY FAILURE WITH HYPOXIA (HCC): Status: RESOLVED | Noted: 2023-10-21 | Resolved: 2023-10-24

## 2023-10-24 PROBLEM — I16.0 HYPERTENSIVE URGENCY: Status: RESOLVED | Noted: 2023-04-22 | Resolved: 2023-10-24

## 2023-10-24 PROBLEM — E87.70 VOLUME OVERLOAD: Status: RESOLVED | Noted: 2022-06-20 | Resolved: 2023-10-24

## 2023-10-24 LAB
ANION GAP SERPL CALC-SCNC: 8 MMOL/L (ref 3–18)
BUN SERPL-MCNC: 22 MG/DL (ref 7–18)
BUN/CREAT SERPL: 4 (ref 12–20)
CALCIUM SERPL-MCNC: 8.2 MG/DL (ref 8.5–10.1)
CHLORIDE SERPL-SCNC: 100 MMOL/L (ref 100–111)
CO2 SERPL-SCNC: 27 MMOL/L (ref 21–32)
CREAT SERPL-MCNC: 5.25 MG/DL (ref 0.6–1.3)
GLUCOSE BLD STRIP.AUTO-MCNC: 100 MG/DL (ref 70–110)
GLUCOSE BLD STRIP.AUTO-MCNC: 81 MG/DL (ref 70–110)
GLUCOSE BLD STRIP.AUTO-MCNC: 95 MG/DL (ref 70–110)
GLUCOSE SERPL-MCNC: 87 MG/DL (ref 74–99)
POTASSIUM SERPL-SCNC: 4.1 MMOL/L (ref 3.5–5.5)
SODIUM SERPL-SCNC: 135 MMOL/L (ref 136–145)

## 2023-10-24 PROCEDURE — 82962 GLUCOSE BLOOD TEST: CPT

## 2023-10-24 PROCEDURE — 2580000003 HC RX 258: Performed by: FAMILY MEDICINE

## 2023-10-24 PROCEDURE — 6370000000 HC RX 637 (ALT 250 FOR IP): Performed by: STUDENT IN AN ORGANIZED HEALTH CARE EDUCATION/TRAINING PROGRAM

## 2023-10-24 PROCEDURE — 36415 COLL VENOUS BLD VENIPUNCTURE: CPT

## 2023-10-24 PROCEDURE — 6370000000 HC RX 637 (ALT 250 FOR IP): Performed by: FAMILY MEDICINE

## 2023-10-24 PROCEDURE — 80048 BASIC METABOLIC PNL TOTAL CA: CPT

## 2023-10-24 PROCEDURE — 6360000002 HC RX W HCPCS: Performed by: FAMILY MEDICINE

## 2023-10-24 RX ADMIN — CINACALCET HYDROCHLORIDE 30 MG: 30 TABLET, FILM COATED ORAL at 09:04

## 2023-10-24 RX ADMIN — CYANOCOBALAMIN TAB 1000 MCG 1000 MCG: 1000 TAB at 08:54

## 2023-10-24 RX ADMIN — LOSARTAN POTASSIUM 100 MG: 50 TABLET, FILM COATED ORAL at 08:54

## 2023-10-24 RX ADMIN — LABETALOL HYDROCHLORIDE 200 MG: 200 TABLET, FILM COATED ORAL at 08:55

## 2023-10-24 RX ADMIN — AMLODIPINE BESYLATE 10 MG: 5 TABLET ORAL at 08:54

## 2023-10-24 RX ADMIN — SEVELAMER CARBONATE 1600 MG: 800 TABLET, FILM COATED ORAL at 08:53

## 2023-10-24 RX ADMIN — SEVELAMER CARBONATE 1600 MG: 800 TABLET, FILM COATED ORAL at 12:15

## 2023-10-24 RX ADMIN — HEPARIN SODIUM 5000 UNITS: 5000 INJECTION INTRAVENOUS; SUBCUTANEOUS at 05:56

## 2023-10-24 RX ADMIN — SODIUM CHLORIDE, PRESERVATIVE FREE 10 ML: 5 INJECTION INTRAVENOUS at 09:05

## 2023-10-24 RX ADMIN — CALCITRIOL 0.5 MCG: 0.25 CAPSULE ORAL at 08:53

## 2023-10-24 NOTE — CARE COORDINATION
The SW spoke with the pt at the bedside in regards to the DC plan. The pt will return home with no needs. The pt will have transport to his car at the dialysis center. The SW provided the pt with a UAI and instructions on how to contact his assigned , regarding caregivers. The pt is aware and agreeable to the DC plan. The pt does not have any DC needs.

## 2023-10-24 NOTE — PLAN OF CARE
Problem: ABCDS Injury Assessment  Goal: Absence of physical injury  Outcome: Progressing     Problem: Safety - Adult  Goal: Free from fall injury  Outcome: Progressing     Problem: Pain  Goal: Verbalizes/displays adequate comfort level or baseline comfort level  Outcome: Progressing     Problem: Chronic Conditions and Co-morbidities  Goal: Patient's chronic conditions and co-morbidity symptoms are monitored and maintained or improved  10/24/2023 0038 by Kasi Blue RN  Outcome: Progressing  10/23/2023 1829 by Yessenia Howe RN  Outcome: Progressing

## 2023-10-25 ENCOUNTER — HOSPITAL ENCOUNTER (EMERGENCY)
Facility: HOSPITAL | Age: 53
Discharge: HOME | End: 2023-10-25
Attending: EMERGENCY MEDICINE
Payer: MEDICARE

## 2023-10-25 ENCOUNTER — APPOINTMENT (OUTPATIENT)
Facility: HOSPITAL | Age: 53
End: 2023-10-25
Payer: MEDICARE

## 2023-10-25 VITALS
DIASTOLIC BLOOD PRESSURE: 74 MMHG | OXYGEN SATURATION: 93 % | RESPIRATION RATE: 16 BRPM | HEART RATE: 70 BPM | WEIGHT: 168 LBS | SYSTOLIC BLOOD PRESSURE: 112 MMHG | TEMPERATURE: 97.3 F | BODY MASS INDEX: 27 KG/M2 | HEIGHT: 66 IN

## 2023-10-25 DIAGNOSIS — R42 LIGHTHEADEDNESS: ICD-10-CM

## 2023-10-25 DIAGNOSIS — R53.1 GENERALIZED WEAKNESS: Primary | ICD-10-CM

## 2023-10-25 DIAGNOSIS — R53.83 FATIGUE, UNSPECIFIED TYPE: ICD-10-CM

## 2023-10-25 DIAGNOSIS — N18.6 END STAGE RENAL DISEASE (HCC): ICD-10-CM

## 2023-10-25 LAB
ALBUMIN SERPL-MCNC: 3.2 G/DL (ref 3.4–5)
ALBUMIN/GLOB SERPL: 0.7 (ref 0.8–1.7)
ALP SERPL-CCNC: 109 U/L (ref 45–117)
ALT SERPL-CCNC: 37 U/L (ref 16–61)
ANION GAP SERPL CALC-SCNC: 7 MMOL/L (ref 3–18)
AST SERPL-CCNC: 22 U/L (ref 10–38)
BASOPHILS # BLD: 0 K/UL (ref 0–0.1)
BASOPHILS NFR BLD: 0 % (ref 0–2)
BILIRUB SERPL-MCNC: 0.4 MG/DL (ref 0.2–1)
BUN SERPL-MCNC: 16 MG/DL (ref 7–18)
BUN/CREAT SERPL: 4 (ref 12–20)
CALCIUM SERPL-MCNC: 8.3 MG/DL (ref 8.5–10.1)
CHLORIDE SERPL-SCNC: 100 MMOL/L (ref 100–111)
CO2 SERPL-SCNC: 31 MMOL/L (ref 21–32)
CREAT SERPL-MCNC: 3.96 MG/DL (ref 0.6–1.3)
DIFFERENTIAL METHOD BLD: ABNORMAL
EOSINOPHIL # BLD: 0.1 K/UL (ref 0–0.4)
EOSINOPHIL NFR BLD: 2 % (ref 0–5)
ERYTHROCYTE [DISTWIDTH] IN BLOOD BY AUTOMATED COUNT: 21.6 % (ref 11.6–14.5)
FLUAV RNA SPEC QL NAA+PROBE: NOT DETECTED
FLUBV RNA SPEC QL NAA+PROBE: NOT DETECTED
GLOBULIN SER CALC-MCNC: 4.7 G/DL (ref 2–4)
GLUCOSE SERPL-MCNC: 116 MG/DL (ref 74–99)
HCT VFR BLD AUTO: 27.8 % (ref 36–48)
HGB BLD-MCNC: 9.2 G/DL (ref 13–16)
IMM GRANULOCYTES # BLD AUTO: 0 K/UL (ref 0–0.04)
IMM GRANULOCYTES NFR BLD AUTO: 0 % (ref 0–0.5)
LYMPHOCYTES # BLD: 0.7 K/UL (ref 0.9–3.6)
LYMPHOCYTES NFR BLD: 13 % (ref 21–52)
MCH RBC QN AUTO: 26.7 PG (ref 24–34)
MCHC RBC AUTO-ENTMCNC: 33.1 G/DL (ref 31–37)
MCV RBC AUTO: 80.8 FL (ref 78–100)
MONOCYTES # BLD: 0.7 K/UL (ref 0.05–1.2)
MONOCYTES NFR BLD: 13 % (ref 3–10)
NEUTS SEG # BLD: 4.2 K/UL (ref 1.8–8)
NEUTS SEG NFR BLD: 72 % (ref 40–73)
NRBC # BLD: 0 K/UL (ref 0–0.01)
NRBC BLD-RTO: 0 PER 100 WBC
PLATELET # BLD AUTO: 153 K/UL (ref 135–420)
PMV BLD AUTO: 9.9 FL (ref 9.2–11.8)
POTASSIUM SERPL-SCNC: 3.2 MMOL/L (ref 3.5–5.5)
PROT SERPL-MCNC: 7.9 G/DL (ref 6.4–8.2)
RBC # BLD AUTO: 3.44 M/UL (ref 4.35–5.65)
RBC MORPH BLD: ABNORMAL
SARS-COV-2 RNA RESP QL NAA+PROBE: NOT DETECTED
SODIUM SERPL-SCNC: 138 MMOL/L (ref 136–145)
TROPONIN I SERPL HS-MCNC: 23 NG/L (ref 0–78)
TROPONIN I SERPL HS-MCNC: 27 NG/L (ref 0–78)
WBC # BLD AUTO: 5.7 K/UL (ref 4.6–13.2)

## 2023-10-25 PROCEDURE — 87636 SARSCOV2 & INF A&B AMP PRB: CPT

## 2023-10-25 PROCEDURE — 94760 N-INVAS EAR/PLS OXIMETRY 1: CPT

## 2023-10-25 PROCEDURE — 84484 ASSAY OF TROPONIN QUANT: CPT

## 2023-10-25 PROCEDURE — 93005 ELECTROCARDIOGRAM TRACING: CPT | Performed by: NURSE PRACTITIONER

## 2023-10-25 PROCEDURE — 85025 COMPLETE CBC W/AUTO DIFF WBC: CPT

## 2023-10-25 PROCEDURE — 99285 EMERGENCY DEPT VISIT HI MDM: CPT

## 2023-10-25 PROCEDURE — 71045 X-RAY EXAM CHEST 1 VIEW: CPT

## 2023-10-25 PROCEDURE — 80053 COMPREHEN METABOLIC PANEL: CPT

## 2023-10-25 ASSESSMENT — PAIN - FUNCTIONAL ASSESSMENT: PAIN_FUNCTIONAL_ASSESSMENT: NONE - DENIES PAIN

## 2023-10-25 NOTE — ED PROVIDER NOTES
[facility] EMERGENCY DEPT  EMERGENCY DEPARTMENT ENCOUNTER       Pt Name: Zay Brand  MRN: [id number]  [address] [facility] [place] 1970  Date of evaluation: 10/25/2023  PCP: Ananya Cali MD  Note Started: 10:07 PM 10/25/23     CHIEF COMPLAINT       Chief Complaint   Patient presents with    Extremity Weakness        HISTORY OF PRESENT ILLNESS: 1 or more elements      History From: Patient  HPI Limitations: None  Chronic Conditions: ESRD, on hemodialysis, iron deficiency anemia, hypertension, CHF, diabetes, sickle cell trait  Social Determinants affecting Dx or Tx: None    Zay Brand is a 48 y.o. male with history of ESRD, on hemodialysis, iron deficiency anemia, hypertension, CHF, diabetes, sickle cell trait, noncompliance with hemodialysis, right above-the-knee lower extremity who presents to ED c/o generalized weakness, lightheadedness, fatigue, hypotension (patient is unable to report numbers) that began during dialysis today. Patient reports he last was 40 minutes prior to the end of his dialysis. Patient has a history of similar complaints, was seen in this department recently for same. Patient denies headache at this time, does report he had 1 during dialysis, dizziness, vision changes, fever/chills, chest pain, shortness of breath, cough, abdominal pain, nausea/vomiting, focal weakness or paresthesias. Nursing Notes were all reviewed and agreed with or any disagreements were addressed in the HPI.     PAST HISTORY     Past Medical History:  Past Medical History:   Diagnosis Date    Chronic kidney disease     Diabetes ([address])     Heart failure ([address])     Hemodialysis patient ([address])     Hypertension     Psychiatric illness 4/22/2023    Sickle cell trait [facility])        Past Surgical History:  Past Surgical History:   Procedure Laterality Date    IR NONTUNNELED VASCULAR CATHETER  6/1/2022    IR NONTUNNELED VASCULAR CATHETER 6/1/2022 [facility] RAD ANGIO IR    IR NONTUNNELED VASCULAR CATHETER  6/1/2022    IR NONTUNNELED VASCULAR CATHETER  2/8/2023    IR NONTUNNELED VASCULAR CATHETER 2/8/2023 [address] RAD ANGIO IR    IR THRMB/INFUSION DIAYSIS CIRCUIT  6/3/2022    IR THRMB/INFUSION DIAYSIS CIRCUIT 6/3/2022 [address] RAD ANGIO IR    IR THRMB/INFUSION DIAYSIS CIRCUIT  6/3/2022    IR TUNNELED CATHETER PLACEMENT GREATER THAN 5 YEARS  6/26/2022    IR TUNNELED CATHETER PLACEMENT GREATER THAN 5 YEARS 6/26/2022 [address] RAD ANGIO IR    IR TUNNELED CATHETER PLACEMENT GREATER THAN 5 YEARS  6/26/2022    IR TUNNELED CATHETER PLACEMENT GREATER THAN 5 YEARS  2/7/2023    IR TUNNELED CATHETER PLACEMENT GREATER THAN 5 YEARS 2/7/2023 [address] RAD ANGIO IR    IR TUNNELED CATHETER PLACEMENT GREATER THAN 5 YEARS  7/21/2023    IR TUNNELED CATHETER PLACEMENT GREATER THAN 5 YEARS 7/21/2023 Sakina Lopez MD [address] RAD ANGIO IR    ORTHOPEDIC SURGERY      right BKA 2017    XR MIDLINE EQUAL OR GREATER THAN 5 YEARS  9/4/2019    XR MIDLINE EQUAL OR GREATER THAN 5 YEARS 9/4/2019       Family History:  Family History   Problem Relation Age of Onset    Sickle Cell Anemia Mother     Diabetes Father        Social History:  Social History     Socioeconomic History    Marital status: Legally      Spouse name: None    Number of children: None    Years of education: None    Highest education level: None   Tobacco Use    Smoking status: Former    Smokeless tobacco: Never   Substance and Sexual Activity    Alcohol use: No    Drug use: No       Allergies: Allergies   Allergen Reactions    Vancomycin Other (See Comments)     Generalized desquamation - body-wide; not Red-Man    Penicillins Rash       CURRENT MEDICATIONS      No current facility-administered medications for this encounter.      Current Outpatient Medications   Medication Sig Dispense Refill    calcitRIOL (ROCALTROL) 0.5 MCG capsule Take 1 capsule by mouth daily 30 capsule 3    hydrALAZINE (APRESOLINE) 100 MG tablet Take 1 tablet by mouth every 8 hours 90 tablet 3    losartan (COZAAR) 100 MG tablet Take 1 tablet by mouth

## 2023-10-25 NOTE — ED NOTES
Patient states that he went to go use the bathroom after dialysis today when he had a near syncopal episode      Kole Rothman, SANIA  10/25/23 1918

## 2023-10-25 NOTE — ED TRIAGE NOTES
Patient arrived from [facility] via ems. Patient was almost completed his dialysis when he asked to go to ed due to feeling weak just like he did when he came to ed after dialysis this past weekend. States he has had 2 episodes of diarrhea an feeling weak.    patient

## 2023-10-26 LAB
EKG ATRIAL RATE: 83 BPM
EKG ATRIAL RATE: 86 BPM
EKG DIAGNOSIS: NORMAL
EKG DIAGNOSIS: NORMAL
EKG P AXIS: 68 DEGREES
EKG P AXIS: 74 DEGREES
EKG P-R INTERVAL: 166 MS
EKG P-R INTERVAL: 174 MS
EKG Q-T INTERVAL: 410 MS
EKG Q-T INTERVAL: 418 MS
EKG QRS DURATION: 90 MS
EKG QRS DURATION: 92 MS
EKG QTC CALCULATION (BAZETT): 490 MS
EKG QTC CALCULATION (BAZETT): 491 MS
EKG R AXIS: -4 DEGREES
EKG R AXIS: 9 DEGREES
EKG T AXIS: 74 DEGREES
EKG T AXIS: 89 DEGREES
EKG VENTRICULAR RATE: 83 BPM
EKG VENTRICULAR RATE: 86 BPM

## 2023-10-26 NOTE — DISCHARGE INSTRUCTIONS
Continue current medications, follow-up with PCM, call for appointment  Obtain hemodialysis as scheduled  Return to care for new or worsening symptoms

## 2023-11-19 NOTE — PROGRESS NOTES
Problem: Mobility Impaired (Adult and Pediatric)  Goal: *Acute Goals and Plan of Care (Insert Text)  Description  Physical Therapy Goals   Initiated 2/1/2020 and to be accomplished within 5-7 day(s)  1. Patient will move from supine <> sit with Mod I-S  in prep for out of bed activity and change of position. 2.  Patient will transfer from bed <> chair with Mod I-S with sliding board for time up in chair for completion of ADL activity. Outcome: Progressing Towards Goal   PHYSICAL THERAPY EVALUATION    Patient: Neil Rosado (57 y.o. male)  Date: 2/1/2020  Primary Diagnosis: Dehydration [E86.0]  Procedure(s) (LRB):  INCISION AND DRAINAGE, WITH DEBRIDEMENT ,BIOPSY OF LEFT FOOT WITH C-ARM (Left) 2 Days Post-Op   Precautions: Fall, NWB    ASSESSMENT :  Based on the objective data described below, the patient presents with decrease independence w/ bed mobility, transfers, and gait. Pt seen in supine prior to session. Pt reported no pain at this time. Pt is NWBing on the L LE and has a R LE prosthetic. Pt refuses to stand only on the the R LE prosthetic and reports the Podiatrist is suppose to administer a special boot for the L foot to be able to w/b. Pt able to sit at the EOB w/o any difficulty. Spoke with pt in regards to transferring from bed to chair, pt reports he has a sliding board and w/c but has not used it in while. Unable to transfer at this time as pt's L LE began to bleed through the dressing, reported to the nurse. Pt transferred back to supine in bed w/ HOB elevated to 90s degrees and L LE elevated after session, call bell and tray in reach, nurse notified after session. Patient will benefit from skilled intervention to address the above impairments.   Patients rehabilitation potential is considered to be Good  Factors which may influence rehabilitation potential include:   []         None noted  []         Mental ability/status  []         Medical condition  [x]         Home/family situation and support systems  [x]         Safety awareness  [x]         Pain tolerance/management  []         Other:      PLAN :  Recommendations and Planned Interventions:  [x]           Bed Mobility Training             [x]    Neuromuscular Re-Education  [x]           Transfer Training                   []    Orthotic/Prosthetic Training  [x]           Gait Training                          []    Modalities  [x]           Therapeutic Exercises          []    Edema Management/Control  [x]           Therapeutic Activities            [x]    Patient and Family Training/Education  []           Other (comment):    Frequency/Duration: Patient will be followed by physical therapy 1-2 times per day to address goals. Discharge Recommendations: Rehab  Further Equipment Recommendations for Discharge: N/A     SUBJECTIVE:   Patient stated I can do whatever you want me to do, but I am not standing.     OBJECTIVE DATA SUMMARY:     Past Medical History:   Diagnosis Date    Chronic kidney disease     Diabetes ([facility] [address]     Heart failure ([facility] [address]     Hypertension      Past Surgical History:   Procedure Laterality Date    HX ORTHOPAEDIC      right BKA 2017     Barriers to Learning/Limitations: yes;  physical  Compensate with: Verbal Cues and Tactile Cues  Prior Level of Function/Home Situation:   Home Situation  Home Environment: Apartment  # Steps to Enter: 0  One/Two Story Residence: One story  Living Alone: Yes  Current DME Used/Available at Home: Cane, quad, Commode, bedside, Shower chair, Walker, rolling, Wheelchair, Other (comment)(Sliding board)  Critical Behavior:  Neurologic State: Alert  Orientation Level: Oriented X4  Skin Condition/Temp: Dry;Warm  Skin Integrity: Wound (add Wound LDA)  Skin Integumentary  Skin Condition/Temp: Dry;Warm  Skin Integrity: Wound (add Wound LDA)  Turgor: Non-tenting  Hair Growth: Present  Varicosities: Absent  Strength:    Strength: Generally decreased, functional  Range Of Motion:  AROM: Generally decreased, functional  Functional Mobility:  Bed Mobility:  Supine to Sit: Supervision;Stand-by assistance  Scooting: Supervision;Stand-by assistance  Balance:   Sitting: Intact  Pain:  Pain Scale 1: Numeric (0 - 10)  Pain Intensity 1: 0  Activity Tolerance:   Fair  Please refer to the flowsheet for vital signs taken during this treatment. After treatment:   []         Patient left in no apparent distress sitting up in chair  [x]         Patient left in no apparent distress in bed  [x]         Call bell left within reach  [x]         Nursing notified  []         Caregiver present  []         Bed alarm activated    COMMUNICATION/EDUCATION:   [x]         Fall prevention education was provided and the patient/caregiver indicated understanding. [x]         Patient/family have participated as able in goal setting and plan of care. [x]         Patient/family agree to work toward stated goals and plan of care. []         Patient understands intent and goals of therapy, but is neutral about his/her participation. []         Patient is unable to participate in goal setting and plan of care.     Thank you for this referral.  Ana Echols, PT   Time Calculation: 11 mins   Eval Complexity: History: HIGH Complexity :3+ comorbidities / personal factors will impact the outcome/ POC Exam:LOW Complexity : 1-2 Standardized tests and measures addressing body structure, function, activity limitation and / or participation in recreation  Presentation: LOW Complexity : Stable, uncomplicated  Clinical Decision Making:High Complexity sit at the EOB only  Overall Complexity:HIGH No

## 2024-02-05 NOTE — PLAN OF CARE
Patient Seen in: [facility]      History     Chief Complaint   Patient presents with    Hand Pain     Stated Complaint: Fall, Hand injury    Subjective:   HPI    31y/o male presents after mechanical fall yesterday on left  FOOSH. Unable to close fist. Pain clinical/fifth metacarpal.  Pulses 2+ bilateral upper extremities.  Is right-hand dominant.  Works in a warehouse carrying pallets and boxes.    Objective:   Past Medical History:   Diagnosis Date    Essential hypertension     Gout     Rickets               History reviewed. No pertinent surgical history.             Social History     Socioeconomic History    Marital status:    Tobacco Use    Smoking status: Never    Smokeless tobacco: Never   Substance and Sexual Activity    Alcohol use: Yes     Comment: socially    Drug use: Never              Review of Systems    Positive for stated complaint: Fall, Hand injury  Other systems are as noted in HPI.  Constitutional and vital signs reviewed.      All other systems reviewed and negative except as noted above.    Physical Exam     ED Triage Vitals [02/05/24 1407]   BP (!) 173/101   Pulse 85   Resp 14   Temp 98.6 °F (37 °C)   Temp src Temporal   SpO2 97 %   O2 Device None (Room air)       Current:BP (!) 173/101   Pulse 85   Temp 98.6 °F (37 °C) (Temporal)   Resp 14   SpO2 97%         Physical Exam  Vitals and nursing note reviewed.   HENT:      Head: Normocephalic.      Right Ear: Tympanic membrane normal.      Left Ear: Tympanic membrane normal.      Nose: Nose normal.      Mouth/Throat:      Mouth: Mucous membranes are moist.   Eyes:      Pupils: Pupils are equal, round, and reactive to light.   Cardiovascular:      Rate and Rhythm: Normal rate and regular rhythm.   Pulmonary:      Effort: Pulmonary effort is normal. No respiratory distress.      Breath sounds: No wheezing.   Abdominal:      General: There is no distension.      Tenderness: There is no abdominal tenderness.  Problem: Safety - Adult  Goal: Free from fall injury  Outcome: Progressing     Problem: Skin/Tissue Integrity  Goal: Absence of new skin breakdown  Description: 1. Monitor for areas of redness and/or skin breakdown  2. Assess vascular access sites hourly  3. Every 4-6 hours minimum:  Change oxygen saturation probe site  4. Every 4-6 hours:  If on nasal continuous positive airway pressure, respiratory therapy assess nares and determine need for appliance change or resting period.   Outcome: Progressing     Problem: ABCDS Injury Assessment  Goal: Absence of physical injury  Outcome: Progressing     Problem: Chronic Conditions and Co-morbidities  Goal: Patient's chronic conditions and co-morbidity symptoms are monitored and maintained or improved  Outcome: Progressing   Musculoskeletal:         General: Normal range of motion.        Hands:       Cervical back: Normal range of motion.      Comments: Medial, radial, ulnar nerves are intact.   Skin:     General: Skin is warm and dry.   Neurological:      Mental Status: He is alert.               ED Course   Labs Reviewed - No data to display       ED Course as of 02/05/24 1623  ------------------------------------------------------------  Time: 02/05 1455  Value: XR HAND (MIN 3 VIEWS), LEFT (CPT=73130)  Comment: CONCLUSION:   1. Acute nondisplaced fracture volar base proximal phalanx 5th digit.   2. Mild arthritic changes involving the wrist and hand                 MDM                                      Medical Decision Making  32-year-old male presents with L fifth digit pain after mechanical fall. Concern for fracture. Xray cw closed non-displaced fracture of L 5th middle phalanx.     Pt placed in finger splint per  tech    Definitive treatment provided by [facility].    Postprocedure with good placement, movement of all distal digits, CR < 2sec all distal digits and intact sensation.    Medial, radial, and ulnar nerves are intact.    Pt educated as to s/sx to watch for (paresthesia, pain, weakness, delayed CR) which should prompt immediate ED return; indicated understanding & agreement.    , dispo:    splint as noted    d/c home, given work note, fu hand if needed.     Physical exam remained stable over serial reexaminations as previously documented.      I have discussed with the patient the results of tests, differential diagnosis, and warning signs and symptoms that should prompt immediate return.  The patient understands these instructions and agrees to the follow-up plan provided.  There are no barriers to learning.   Appropriate f/u given.  Patient agrees to return for any concerns/problems/complications.            Disposition and Plan     Clinical Impression:  1. Pain, hand    2. Closed nondisplaced  fracture of middle phalanx of left little finger, initial encounter         Disposition:  Discharge  2/5/2024  3:00 pm    Follow-up:  Manjeet Hilliard MD  [address] [place] [id number]  [phone]    Call             Medications Prescribed:  Discharge Medication List as of 2/5/2024  3:02 PM

## 2024-02-19 ENCOUNTER — APPOINTMENT (OUTPATIENT)
Facility: HOSPITAL | Age: 54
DRG: 291 | End: 2024-02-19
Payer: MEDICARE

## 2024-02-19 ENCOUNTER — HOSPITAL ENCOUNTER (INPATIENT)
Facility: HOSPITAL | Age: 54
LOS: 4 days | Discharge: HOME | DRG: 291 | End: 2024-02-23
Attending: EMERGENCY MEDICINE | Admitting: INTERNAL MEDICINE
Payer: MEDICARE

## 2024-02-19 DIAGNOSIS — J81.0 ACUTE PULMONARY EDEMA (HCC): ICD-10-CM

## 2024-02-19 DIAGNOSIS — Z99.2 END STAGE RENAL DISEASE ON DIALYSIS (HCC): ICD-10-CM

## 2024-02-19 DIAGNOSIS — E16.2 HYPOGLYCEMIA: Primary | ICD-10-CM

## 2024-02-19 DIAGNOSIS — N18.6 END STAGE RENAL DISEASE ON DIALYSIS (HCC): ICD-10-CM

## 2024-02-19 PROBLEM — G93.40 ENCEPHALOPATHY ACUTE: Status: ACTIVE | Noted: 2024-02-19

## 2024-02-19 PROBLEM — I50.33 CHF (CONGESTIVE HEART FAILURE), NYHA CLASS I, ACUTE ON CHRONIC, DIASTOLIC (HCC): Status: ACTIVE | Noted: 2024-02-19

## 2024-02-19 PROBLEM — E87.70 VOLUME OVERLOAD: Status: ACTIVE | Noted: 2024-02-19

## 2024-02-19 LAB
ALBUMIN SERPL-MCNC: 3.6 G/DL (ref 3.4–5)
ALBUMIN/GLOB SERPL: 0.7 (ref 0.8–1.7)
ALP SERPL-CCNC: 94 U/L (ref 45–117)
ALT SERPL-CCNC: 20 U/L (ref 16–61)
ANION GAP SERPL CALC-SCNC: 15 MMOL/L (ref 3–18)
AST SERPL-CCNC: 11 U/L (ref 10–38)
BILIRUB SERPL-MCNC: 0.8 MG/DL (ref 0.2–1)
BUN SERPL-MCNC: 102 MG/DL (ref 7–18)
BUN/CREAT SERPL: 6 (ref 12–20)
CALCIUM SERPL-MCNC: 7.5 MG/DL (ref 8.5–10.1)
CHLORIDE SERPL-SCNC: 106 MMOL/L (ref 100–111)
CO2 SERPL-SCNC: 17 MMOL/L (ref 21–32)
CREAT SERPL-MCNC: 16.3 MG/DL (ref 0.6–1.3)
EKG ATRIAL RATE: 87 BPM
EKG DIAGNOSIS: NORMAL
EKG P AXIS: 56 DEGREES
EKG P-R INTERVAL: 164 MS
EKG Q-T INTERVAL: 454 MS
EKG QRS DURATION: 90 MS
EKG QTC CALCULATION (BAZETT): 546 MS
EKG R AXIS: 10 DEGREES
EKG T AXIS: 77 DEGREES
EKG VENTRICULAR RATE: 87 BPM
ERYTHROCYTE [DISTWIDTH] IN BLOOD BY AUTOMATED COUNT: 20.5 % (ref 11.6–14.5)
ETHANOL SERPL-MCNC: 8 MG/DL (ref 0–3)
GLOBULIN SER CALC-MCNC: 4.9 G/DL (ref 2–4)
GLUCOSE BLD STRIP.AUTO-MCNC: 116 MG/DL (ref 70–110)
GLUCOSE BLD STRIP.AUTO-MCNC: 147 MG/DL (ref 70–110)
GLUCOSE BLD STRIP.AUTO-MCNC: 33 MG/DL (ref 74–106)
GLUCOSE BLD STRIP.AUTO-MCNC: 37 MG/DL (ref 70–110)
GLUCOSE BLD STRIP.AUTO-MCNC: 90 MG/DL (ref 70–110)
GLUCOSE SERPL-MCNC: 36 MG/DL (ref 74–99)
HCT VFR BLD AUTO: 35.2 % (ref 36–48)
HGB BLD-MCNC: 11.7 G/DL (ref 13–16)
LACTATE BLD-SCNC: 0.72 MMOL/L (ref 0.4–2)
MAGNESIUM SERPL-MCNC: 3.3 MG/DL (ref 1.6–2.6)
MCH RBC QN AUTO: 26.4 PG (ref 24–34)
MCHC RBC AUTO-ENTMCNC: 33.2 G/DL (ref 31–37)
MCV RBC AUTO: 79.5 FL (ref 78–100)
NRBC # BLD: 0 K/UL (ref 0–0.01)
NRBC BLD-RTO: 0 PER 100 WBC
PLATELET # BLD AUTO: 141 K/UL (ref 135–420)
POTASSIUM SERPL-SCNC: 5.2 MMOL/L (ref 3.5–5.5)
PROT SERPL-MCNC: 8.5 G/DL (ref 6.4–8.2)
RBC # BLD AUTO: 4.43 M/UL (ref 4.35–5.65)
SERVICE CMNT-IMP: ABNORMAL
SODIUM SERPL-SCNC: 138 MMOL/L (ref 136–145)
TROPONIN I SERPL HS-MCNC: 27 NG/L (ref 0–78)
WBC # BLD AUTO: 5.2 K/UL (ref 4.6–13.2)

## 2024-02-19 PROCEDURE — 2500000003 HC RX 250 WO HCPCS: Performed by: EMERGENCY MEDICINE

## 2024-02-19 PROCEDURE — 83605 ASSAY OF LACTIC ACID: CPT

## 2024-02-19 PROCEDURE — 6360000004 HC RX CONTRAST MEDICATION: Performed by: EMERGENCY MEDICINE

## 2024-02-19 PROCEDURE — 93005 ELECTROCARDIOGRAM TRACING: CPT | Performed by: EMERGENCY MEDICINE

## 2024-02-19 PROCEDURE — 71275 CT ANGIOGRAPHY CHEST: CPT

## 2024-02-19 PROCEDURE — 72125 CT NECK SPINE W/O DYE: CPT

## 2024-02-19 PROCEDURE — 87506 IADNA-DNA/RNA PROBE TQ 6-11: CPT

## 2024-02-19 PROCEDURE — 82077 ASSAY SPEC XCP UR&BREATH IA: CPT

## 2024-02-19 PROCEDURE — 2580000003 HC RX 258: Performed by: INTERNAL MEDICINE

## 2024-02-19 PROCEDURE — 80053 COMPREHEN METABOLIC PANEL: CPT

## 2024-02-19 PROCEDURE — 90935 HEMODIALYSIS ONE EVALUATION: CPT

## 2024-02-19 PROCEDURE — 83735 ASSAY OF MAGNESIUM: CPT

## 2024-02-19 PROCEDURE — 74177 CT ABD & PELVIS W/CONTRAST: CPT

## 2024-02-19 PROCEDURE — 82962 GLUCOSE BLOOD TEST: CPT

## 2024-02-19 PROCEDURE — 6360000002 HC RX W HCPCS: Performed by: STUDENT IN AN ORGANIZED HEALTH CARE EDUCATION/TRAINING PROGRAM

## 2024-02-19 PROCEDURE — 84484 ASSAY OF TROPONIN QUANT: CPT

## 2024-02-19 PROCEDURE — 85027 COMPLETE CBC AUTOMATED: CPT

## 2024-02-19 PROCEDURE — 71045 X-RAY EXAM CHEST 1 VIEW: CPT

## 2024-02-19 PROCEDURE — 99285 EMERGENCY DEPT VISIT HI MDM: CPT

## 2024-02-19 PROCEDURE — 5A1D70Z PERFORMANCE OF URINARY FILTRATION, INTERMITTENT, LESS THAN 6 HOURS PER DAY: ICD-10-PCS | Performed by: INTERNAL MEDICINE

## 2024-02-19 PROCEDURE — 70450 CT HEAD/BRAIN W/O DYE: CPT

## 2024-02-19 PROCEDURE — 1100000003 HC PRIVATE W/ TELEMETRY

## 2024-02-19 PROCEDURE — 89055 LEUKOCYTE ASSESSMENT FECAL: CPT

## 2024-02-19 PROCEDURE — 73630 X-RAY EXAM OF FOOT: CPT

## 2024-02-19 PROCEDURE — 87040 BLOOD CULTURE FOR BACTERIA: CPT

## 2024-02-19 RX ORDER — ACETAMINOPHEN 650 MG/1
650 SUPPOSITORY RECTAL EVERY 6 HOURS PRN
Status: DISCONTINUED | OUTPATIENT
Start: 2024-02-19 | End: 2024-02-23 | Stop reason: HOSPADM

## 2024-02-19 RX ORDER — DEXTROSE MONOHYDRATE 25 G/50ML
25 INJECTION, SOLUTION INTRAVENOUS PRN
Status: DISCONTINUED | OUTPATIENT
Start: 2024-02-19 | End: 2024-02-20

## 2024-02-19 RX ORDER — AMLODIPINE BESYLATE 5 MG/1
10 TABLET ORAL DAILY
Status: DISCONTINUED | OUTPATIENT
Start: 2024-02-19 | End: 2024-02-23 | Stop reason: HOSPADM

## 2024-02-19 RX ORDER — HEPARIN SODIUM (PORCINE) LOCK FLUSH IV SOLN 100 UNIT/ML 100 UNIT/ML
1.6 SOLUTION INTRAVENOUS PRN
Status: DISCONTINUED | OUTPATIENT
Start: 2024-02-19 | End: 2024-02-23 | Stop reason: HOSPADM

## 2024-02-19 RX ORDER — HEPARIN SODIUM 5000 [USP'U]/ML
5000 INJECTION, SOLUTION INTRAVENOUS; SUBCUTANEOUS EVERY 8 HOURS SCHEDULED
Status: DISCONTINUED | OUTPATIENT
Start: 2024-02-19 | End: 2024-02-23 | Stop reason: HOSPADM

## 2024-02-19 RX ORDER — SODIUM CHLORIDE 0.9 % (FLUSH) 0.9 %
5-40 SYRINGE (ML) INJECTION EVERY 12 HOURS SCHEDULED
Status: DISCONTINUED | OUTPATIENT
Start: 2024-02-19 | End: 2024-02-23 | Stop reason: HOSPADM

## 2024-02-19 RX ORDER — LABETALOL 200 MG/1
200 TABLET, FILM COATED ORAL 2 TIMES DAILY
Status: DISCONTINUED | OUTPATIENT
Start: 2024-02-19 | End: 2024-02-23 | Stop reason: HOSPADM

## 2024-02-19 RX ORDER — DEXTROSE MONOHYDRATE 25 G/50ML
25 INJECTION, SOLUTION INTRAVENOUS PRN
Status: DISCONTINUED | OUTPATIENT
Start: 2024-02-19 | End: 2024-02-23 | Stop reason: HOSPADM

## 2024-02-19 RX ORDER — CALCITRIOL 0.25 UG/1
0.5 CAPSULE, LIQUID FILLED ORAL DAILY
Status: DISCONTINUED | OUTPATIENT
Start: 2024-02-19 | End: 2024-02-23 | Stop reason: HOSPADM

## 2024-02-19 RX ORDER — SODIUM CHLORIDE 9 MG/ML
INJECTION, SOLUTION INTRAVENOUS PRN
Status: DISCONTINUED | OUTPATIENT
Start: 2024-02-19 | End: 2024-02-23 | Stop reason: HOSPADM

## 2024-02-19 RX ORDER — POLYETHYLENE GLYCOL 3350 17 G/17G
17 POWDER, FOR SOLUTION ORAL DAILY PRN
Status: DISCONTINUED | OUTPATIENT
Start: 2024-02-19 | End: 2024-02-23 | Stop reason: HOSPADM

## 2024-02-19 RX ORDER — ONDANSETRON 4 MG/1
4 TABLET, ORALLY DISINTEGRATING ORAL EVERY 8 HOURS PRN
Status: DISCONTINUED | OUTPATIENT
Start: 2024-02-19 | End: 2024-02-23 | Stop reason: HOSPADM

## 2024-02-19 RX ORDER — HYDRALAZINE HYDROCHLORIDE 50 MG/1
100 TABLET, FILM COATED ORAL EVERY 8 HOURS SCHEDULED
Status: DISCONTINUED | OUTPATIENT
Start: 2024-02-19 | End: 2024-02-20

## 2024-02-19 RX ORDER — LOSARTAN POTASSIUM 50 MG/1
100 TABLET ORAL DAILY
Status: DISCONTINUED | OUTPATIENT
Start: 2024-02-19 | End: 2024-02-23 | Stop reason: HOSPADM

## 2024-02-19 RX ORDER — ACETAMINOPHEN 325 MG/1
650 TABLET ORAL EVERY 6 HOURS PRN
Status: DISCONTINUED | OUTPATIENT
Start: 2024-02-19 | End: 2024-02-23 | Stop reason: HOSPADM

## 2024-02-19 RX ORDER — SODIUM CHLORIDE 0.9 % (FLUSH) 0.9 %
5-40 SYRINGE (ML) INJECTION PRN
Status: DISCONTINUED | OUTPATIENT
Start: 2024-02-19 | End: 2024-02-23 | Stop reason: HOSPADM

## 2024-02-19 RX ORDER — ONDANSETRON 2 MG/ML
4 INJECTION INTRAMUSCULAR; INTRAVENOUS EVERY 6 HOURS PRN
Status: DISCONTINUED | OUTPATIENT
Start: 2024-02-19 | End: 2024-02-23 | Stop reason: HOSPADM

## 2024-02-19 RX ORDER — HEPARIN SODIUM (PORCINE) LOCK FLUSH IV SOLN 100 UNIT/ML 100 UNIT/ML
2000 SOLUTION INTRAVENOUS
Status: COMPLETED | OUTPATIENT
Start: 2024-02-19 | End: 2024-02-19

## 2024-02-19 RX ORDER — SEVELAMER CARBONATE 800 MG/1
800 TABLET, FILM COATED ORAL
Status: DISCONTINUED | OUTPATIENT
Start: 2024-02-19 | End: 2024-02-23 | Stop reason: HOSPADM

## 2024-02-19 RX ADMIN — HEPARIN SODIUM (PORCINE) LOCK FLUSH IV SOLN 100 UNIT/ML 2000 UNITS: 100 SOLUTION at 23:13

## 2024-02-19 RX ADMIN — DEXTROSE MONOHYDRATE 25 G: 25 INJECTION, SOLUTION INTRAVENOUS at 14:19

## 2024-02-19 RX ADMIN — IOPAMIDOL 100 ML: 755 INJECTION, SOLUTION INTRAVENOUS at 17:59

## 2024-02-19 RX ADMIN — SODIUM CHLORIDE, PRESERVATIVE FREE 10 ML: 5 INJECTION INTRAVENOUS at 21:32

## 2024-02-19 ASSESSMENT — PAIN - FUNCTIONAL ASSESSMENT: PAIN_FUNCTIONAL_ASSESSMENT: NONE - DENIES PAIN

## 2024-02-19 NOTE — ED PROVIDER NOTES
EMERGENCY DEPARTMENT HISTORY AND PHYSICAL EXAM    Date: 2/19/2024  Patient Name: Evan Huizar    History of Presenting Illness     Chief Complaint   Patient presents with    Hypoglycemia         History Provided By: Patient and EMS    Additional History (Context):   1:45 PM RIZWANA Huizar is a 53 y.o. male with PMHX of chronic CHF, renal dialysis, iron deficiency anemia, right BKA, dialysis, chronic anemia, chronic ulcerations left foot and left toe, dyspnea sickle cell trait who presents to the emergency department C/O low blood sugar and altered mental status.  Patient missed dialysis last Friday (he goes Monday Wednesday Friday) due to feeling weak and fatigued.  He reports to us he has been having fatigue and diarrhea.  He had nausea and has not been eating well.  Additionally he has been having some heaviness and nausea in his chest.  Paramedics called to his house after he spoke with multiple family members about his fatigue and they told him he should go to the hospital.  They found him confused altered not making sense.  He was alert to himself only when paramedics picked him up in addition here in the emergency department.  They were not able to establish an IV but did give him a shot of glucagon IM.  They also attempted for glucose tablets.  Blood sugar in the field was 24 and he arrived with a blood sugar of 34 there is no history of him falling.  History is otherwise vague and uncertain    He was able to give us basic information upon arrival.  Airway was patent without gag reflex.  O2 sats were a bit low but however after 2 L of oxygen his O2 sats perked up to 94 to 97% x 2 L.  Subsequently IV was established using midline 18-gauge and he was given amp of D50 return to mostly close to baseline normal status.  He was a bit confused originally but was able to give a reliable history talking about diarrhea and fatigue over the last several days missing dialysis on Friday due to fatigue as well as

## 2024-02-19 NOTE — ED NOTES
Sister called on the phone, states patient feels sleepy and she is concerned as his blood sugar was 27 earlier, explained to sister that his blood sugar was just checked, is in normal range, that this RN just spoke with patient and told him that as soon as he was able to eat/drink we would provide him something. After sister repeatedly told RN the same thing and RN explained to her multiple times, sister states \"if I come up there and his blood sugar is low theres going to be a problem.\" Explained to sister that threats are not appropriate and again all appropriate action is being taken.

## 2024-02-19 NOTE — ED TRIAGE NOTES
Patient arrives to ED from home, feeling lethargic and weak, dialysis MWF, did not go today. FSBS 27, was given 4 oral glucose tubes, and 1mg glucagon, repeat was 34

## 2024-02-20 LAB
ANION GAP SERPL CALC-SCNC: 9 MMOL/L (ref 3–18)
BASOPHILS # BLD: 0 K/UL (ref 0–0.1)
BASOPHILS NFR BLD: 0 % (ref 0–2)
BUN SERPL-MCNC: 52 MG/DL (ref 7–18)
BUN/CREAT SERPL: 5 (ref 12–20)
C COLI+JEJUNI TUF STL QL NAA+PROBE: NEGATIVE
CALCIUM SERPL-MCNC: 7.8 MG/DL (ref 8.5–10.1)
CHLORIDE SERPL-SCNC: 105 MMOL/L (ref 100–111)
CO2 SERPL-SCNC: 23 MMOL/L (ref 21–32)
CREAT SERPL-MCNC: 9.73 MG/DL (ref 0.6–1.3)
DIFFERENTIAL METHOD BLD: ABNORMAL
EC STX1+STX2 GENES STL QL NAA+PROBE: NEGATIVE
EOSINOPHIL # BLD: 0.4 K/UL (ref 0–0.4)
EOSINOPHIL NFR BLD: 7 % (ref 0–5)
ERYTHROCYTE [DISTWIDTH] IN BLOOD BY AUTOMATED COUNT: 19.8 % (ref 11.6–14.5)
ETEC ELTA+ESTB GENES STL QL NAA+PROBE: NEGATIVE
GLUCOSE BLD STRIP.AUTO-MCNC: 93 MG/DL (ref 70–110)
GLUCOSE SERPL-MCNC: 100 MG/DL (ref 74–99)
HCT VFR BLD AUTO: 31.1 % (ref 36–48)
HGB BLD-MCNC: 10.8 G/DL (ref 13–16)
IMM GRANULOCYTES # BLD AUTO: 0 K/UL (ref 0–0.04)
IMM GRANULOCYTES NFR BLD AUTO: 0 % (ref 0–0.5)
LYMPHOCYTES # BLD: 0.5 K/UL (ref 0.9–3.6)
LYMPHOCYTES NFR BLD: 9 % (ref 21–52)
MCH RBC QN AUTO: 26.5 PG (ref 24–34)
MCHC RBC AUTO-ENTMCNC: 34.7 G/DL (ref 31–37)
MCV RBC AUTO: 76.4 FL (ref 78–100)
MONOCYTES # BLD: 0.6 K/UL (ref 0.05–1.2)
MONOCYTES NFR BLD: 12 % (ref 3–10)
NEUTS SEG # BLD: 3.9 K/UL (ref 1.8–8)
NEUTS SEG NFR BLD: 72 % (ref 40–73)
NRBC # BLD: 0 K/UL (ref 0–0.01)
NRBC BLD-RTO: 0 PER 100 WBC
P SHIGELLOIDES DNA STL QL NAA+PROBE: NEGATIVE
PLATELET # BLD AUTO: 132 K/UL (ref 135–420)
POTASSIUM SERPL-SCNC: 3.7 MMOL/L (ref 3.5–5.5)
RBC # BLD AUTO: 4.07 M/UL (ref 4.35–5.65)
SALMONELLA SP SPAO STL QL NAA+PROBE: NEGATIVE
SHIGELLA SP+EIEC IPAH STL QL NAA+PROBE: NEGATIVE
SODIUM SERPL-SCNC: 137 MMOL/L (ref 136–145)
V CHOL+PARA+VUL DNA STL QL NAA+NON-PROBE: NEGATIVE
WBC # BLD AUTO: 5.3 K/UL (ref 4.6–13.2)
WBC #/AREA STL HPF: NORMAL /HPF (ref 0–4)
Y ENTEROCOL DNA STL QL NAA+NON-PROBE: NEGATIVE

## 2024-02-20 PROCEDURE — 1100000003 HC PRIVATE W/ TELEMETRY

## 2024-02-20 PROCEDURE — 6370000000 HC RX 637 (ALT 250 FOR IP): Performed by: INTERNAL MEDICINE

## 2024-02-20 PROCEDURE — 6360000002 HC RX W HCPCS: Performed by: INTERNAL MEDICINE

## 2024-02-20 PROCEDURE — 90935 HEMODIALYSIS ONE EVALUATION: CPT

## 2024-02-20 PROCEDURE — 2580000003 HC RX 258: Performed by: INTERNAL MEDICINE

## 2024-02-20 PROCEDURE — 2700000000 HC OXYGEN THERAPY PER DAY

## 2024-02-20 PROCEDURE — 80048 BASIC METABOLIC PNL TOTAL CA: CPT

## 2024-02-20 PROCEDURE — 82962 GLUCOSE BLOOD TEST: CPT

## 2024-02-20 PROCEDURE — 85025 COMPLETE CBC W/AUTO DIFF WBC: CPT

## 2024-02-20 RX ORDER — HYDRALAZINE HYDROCHLORIDE 50 MG/1
100 TABLET, FILM COATED ORAL EVERY 8 HOURS SCHEDULED
Status: DISCONTINUED | OUTPATIENT
Start: 2024-02-20 | End: 2024-02-23 | Stop reason: HOSPADM

## 2024-02-20 RX ADMIN — SODIUM CHLORIDE, PRESERVATIVE FREE 10 ML: 5 INJECTION INTRAVENOUS at 20:41

## 2024-02-20 RX ADMIN — LABETALOL HYDROCHLORIDE 200 MG: 200 TABLET, FILM COATED ORAL at 20:35

## 2024-02-20 RX ADMIN — ONDANSETRON 4 MG: 2 INJECTION INTRAMUSCULAR; INTRAVENOUS at 08:49

## 2024-02-20 RX ADMIN — SEVELAMER CARBONATE 800 MG: 800 TABLET, FILM COATED ORAL at 16:29

## 2024-02-20 RX ADMIN — SODIUM CHLORIDE, PRESERVATIVE FREE 10 ML: 5 INJECTION INTRAVENOUS at 08:49

## 2024-02-20 RX ADMIN — HEPARIN SODIUM 5000 UNITS: 5000 INJECTION INTRAVENOUS; SUBCUTANEOUS at 20:35

## 2024-02-20 RX ADMIN — LOSARTAN POTASSIUM 100 MG: 50 TABLET, FILM COATED ORAL at 08:43

## 2024-02-20 RX ADMIN — HYDRALAZINE HYDROCHLORIDE 100 MG: 50 TABLET, FILM COATED ORAL at 03:44

## 2024-02-20 RX ADMIN — CALCITRIOL 0.5 MCG: 0.25 CAPSULE ORAL at 08:46

## 2024-02-20 RX ADMIN — LABETALOL HYDROCHLORIDE 200 MG: 200 TABLET, FILM COATED ORAL at 08:43

## 2024-02-20 RX ADMIN — HEPARIN SODIUM 5000 UNITS: 5000 INJECTION INTRAVENOUS; SUBCUTANEOUS at 05:49

## 2024-02-20 RX ADMIN — HYDRALAZINE HYDROCHLORIDE 100 MG: 50 TABLET, FILM COATED ORAL at 20:35

## 2024-02-20 RX ADMIN — HYDRALAZINE HYDROCHLORIDE 100 MG: 50 TABLET, FILM COATED ORAL at 14:48

## 2024-02-20 ASSESSMENT — PAIN SCALES - GENERAL
PAINLEVEL_OUTOF10: 0
PAINLEVEL_OUTOF10: 0

## 2024-02-20 NOTE — CONSULTS
RENAL CONSULT  2024    Patient:  Evan Huizar  :  1970  Gender:  male  MRN #:  [id number]    Reason for Consult: encephalopathy, pulmonary edema and ESRD     History of Present Illness:    Evan Huizar is a 53 y.o. year old male ESRD on HD,  DM-2, anemia, uncontrolled hypertension , hyperphosphatemia non compliance to dialysis prescription, dietary/life style modification was brought confused and encephalopathic / He had missed dialysis last week Friday   Was found to have hypoglycemia , encephalopathic with uncontrolled BP   CT chest showed- pulmonary edema  Arranged for hemodialysis last night  Still has shortness of breath, diarrhea intermittently   No chest pain or fever           Past Medical History:   Diagnosis Date    Chronic kidney disease     Diabetes (HCC)     Heart failure (HCC)     Hemodialysis patient (HCC)     Hypertension     Psychiatric illness 2023    Sickle cell trait (HCC)      Past Surgical History:   Procedure Laterality Date    IR NONTUNNELED VASCULAR CATHETER  2022    IR NONTUNNELED VASCULAR CATHETER 2022 [facility] RAD ANGIO IR    IR NONTUNNELED VASCULAR CATHETER  2022    IR NONTUNNELED VASCULAR CATHETER  2023    IR NONTUNNELED VASCULAR CATHETER 2023 [facility] RAD ANGIO IR    IR THRMB/INFUSION DIAYSIS CIRCUIT  6/3/2022    IR THRMB/INFUSION DIAYSIS CIRCUIT 6/3/2022 [facility] RAD ANGIO IR    IR THRMB/INFUSION DIAYSIS CIRCUIT  6/3/2022    IR TUNNELED CATHETER PLACEMENT GREATER THAN 5 YEARS  2022    IR TUNNELED CATHETER PLACEMENT GREATER THAN 5 YEARS 2022 [facility] RAD ANGIO IR    IR TUNNELED CATHETER PLACEMENT GREATER THAN 5 YEARS  2022    IR TUNNELED CATHETER PLACEMENT GREATER THAN 5 YEARS  2023    IR TUNNELED CATHETER PLACEMENT GREATER THAN 5 YEARS 2023 [facility] RAD ANGIO IR    IR TUNNELED CATHETER PLACEMENT GREATER THAN 5 YEARS  2023    IR TUNNELED CATHETER PLACEMENT GREATER THAN 5 YEARS 2023 Mora Chowdary MD [facility] RAD ANGIO IR     mg  200 mg Oral BID Stew Subramanian MD        losartan (COZAAR) tablet 100 mg  100 mg Oral Daily Stew Subramanian MD        sevelamer (RENVELA) tablet 800 mg  800 mg Oral TID  Stew Subramanian MD        heparin 100 UNIT/ML injection 160 Units  1.6 mL IntraCATHeter PRN Rakesh Vincent MD           [unfilled]    Review of Symptoms:     Consitutional Symptoms: No fever, weight loss, weight gain and fatigue  Eyes:- No change in vision , no itching   Ears, Nose, Mouth, Throat:  No neck pain , swelling ,hearing loss and nose bleed.   Pulmonary: No cough and shortness of breath .  CVS: No  Chest pain , palpitation and orthopnea  GI: No nausea, vomiting, abdominal pain, and blood in stool   - No burning, frequency ,urgency  and difficulty voiding .  Neurological:No dizzy ness, syncope, focal weakness and numbness .  Skin : No rash and erythema   Endocrine: No feeling of excessive cold or warmth, hot flushes  Psychiatric: Denied feeling depressed    Objective:    BP (!) 156/73   Pulse 89   Temp 98.1 °F (36.7 °C) (Oral)   Resp 20   Ht 1.676 m (5' 6\")   Wt 76.2 kg (167 lb 15.9 oz)   SpO2 100%   BMI 27.11 kg/m²     Physical Exam:    Pt awake,  alert and comfortable  HEENT: No JVD, anicteric sclera, no neck swelling  Lung: clear to auscultation, no crackles and wheeze  Heart: s1s2 regualr no rubs or murmur  Abdomen: soft, non tender, no guarding, normal bowel sounds.  Ext: no edema and pulsation intact  Skin: No rash and erythema  CNS- Oriented to time , place and person     Laboratory Data:    Lab Results   Component Value Date    BUN 52 (H) 02/20/2024     (H) 02/19/2024    BUN 16 10/25/2023     02/20/2024     02/19/2024     10/25/2023    CO2 23 02/20/2024    CO2 17 (L) 02/19/2024    CO2 31 10/25/2023    GLUCOSE 100 (H) 02/20/2024    GLUCOSE 36 (LL) 02/19/2024    GLUCOSE 116 (H) 10/25/2023     Lab Results   Component Value Date    WBC 5.3 02/20/2024    HGB 10.8 (L) 02/20/2024    HCT 31.1

## 2024-02-20 NOTE — PROGRESS NOTES
Received pre HD report from  DALTON Sprague, SANIA.      Pt in bed, A+O x4, on O2 via NC @ 3L, no s/s of distress noted.      Accessed right upper chest CVC w/o complications.  Patient given Heparin bolus of 2000 units prior to start of treatment.    Tx initiated at 1239.      CVC flowing with ease.  For hemodynamic stability UF goal 2500 ml.  Offered assistance with repositioning every 2 hours.  Vascular access visible at all times during treatment, line connections intact at all times.      Tx completed at 1541, tolerated well 2L removed.  De-accessed per protocol.    Heparin indwell 1.6ml in arterial, and 1.6ml in venous catheter.      Unit nurse DALTON Sprague, SANIA given report.

## 2024-02-20 NOTE — PLAN OF CARE
Problem: Discharge Planning  Goal: Discharge to home or other facility with appropriate resources  Outcome: Progressing  Flowsheets (Taken 2/20/2024 0815)  Discharge to home or other facility with appropriate resources:   Identify barriers to discharge with patient and caregiver   Arrange for needed discharge resources and transportation as appropriate   Identify discharge learning needs (meds, wound care, etc)   Refer to discharge planning if patient needs post-hospital services based on physician order or complex needs related to functional status, cognitive ability or social support system     Problem: Safety - Adult  Goal: Free from fall injury  Outcome: Progressing     Problem: Chronic Conditions and Co-morbidities  Goal: Patient's chronic conditions and co-morbidity symptoms are monitored and maintained or improved  2/20/2024 1351 by Rubina Sprague, RN  Outcome: Progressing  2/20/2024 1243 by Traci Jerry RN  Outcome: Progressing  Flowsheets (Taken 2/20/2024 0815 by Rubina Sprague, RN)  Care Plan - Patient's Chronic Conditions and Co-Morbidity Symptoms are Monitored and Maintained or Improved:   Monitor and assess patient's chronic conditions and comorbid symptoms for stability, deterioration, or improvement   Collaborate with multidisciplinary team to address chronic and comorbid conditions and prevent exacerbation or deterioration   Update acute care plan with appropriate goals if chronic or comorbid symptoms are exacerbated and prevent overall improvement and discharge  2/20/2024 0021 by Katie Grande, RN  Outcome: Progressing  Patient missed HD x a few sessions - HD daily while here for  now - currently in dialysis.  BP elevated - meds held as he is having dialysis.  Episode of N/V this morning - medicated with Zofran and no further episodes.  Will continue to monitor patient.

## 2024-02-20 NOTE — ED NOTES
TRANSFER - OUT REPORT:    Verbal report given to SANIA Moore on Evan Huizar  being transferred to [facility] for routine progression of patient care       Report consisted of patient's Situation, Background, Assessment and   Recommendations(SBAR).     Information from the following report(s) Nurse Handoff Report, ED Encounter Summary, ED SBAR, MAR, Recent Results, and Med Rec Status was reviewed with the receiving nurse.    Bourg Fall Assessment:    Presents to emergency department  because of falls (Syncope, seizure, or loss of consciousness): Yes  Age > 70: No  Altered Mental Status, Intoxication with alcohol or substance confusion (Disorientation, impaired judgment, poor safety awaremess, or inability to follow instructions): Yes  Impaired Mobility: Ambulates or transfers with assistive devices or assistance; Unable to ambulate or transer.: Yes             Lines:   Peripheral IV 02/19/24 Distal;Right Cephalic (Active)       Extended Dwell Peripherial IV 02/19/24 Right Basilic (Active)       Tunneled Hemodialysis Catheter Right Subclavian (Active)        Opportunity for questions and clarification was provided.      Patient transported with:  Registered Nurse

## 2024-02-20 NOTE — PLAN OF CARE
INTERVENTION:  HEMODYNAMIC STABILIZATION  MAINTAIN BP WNL WHILE ON HD.    INTERVENTION:  FLUID MANAGEMENT  WILL ATTEMPT 2500 ML TOTAL FLUID REMOVAL AS TOLERATED.    INTERVENTION:  METABOLIC/ELECTROLYTE MANAGEMENT  3.0 POTASSIUM 2.5 CALCIUM DIALYSATE USED WITH HD TODAY.    INTERVENTION:  HEMODIALYSIS ACCESS SITE MANAGEMENT  RIGHT SIDE UPPER CHEST CVC ACCESSED USING ASEPTIC TECHNIQUE.    GOAL:  SIGNS AND SYMPTOMS OF LISTED POTENTIAL PROBLEMS WILL BE ABSENT OR MANAGEABLE.    OUTCOME:  PROGRESSING.    HD PLANNED FOR 3 HOURS TODAY.        Problem: Chronic Conditions and Co-morbidities  Goal: Patient's chronic conditions and co-morbidity symptoms are monitored and maintained or improved  2/20/2024 1243 by Traci Jerry, RN  Outcome: Progressing

## 2024-02-20 NOTE — PROGRESS NOTES
Hospitalist Progress Note    Patient: Evan Huizar MRN: [id number]  [facility]: [id number]    YOB: 1970  Age: 53 y.o.  Sex: male    DOA: 2/19/2024 LOS:  LOS: 1 day          Chief Complaint:    CHF diastolic acute from missed hemodialysis    Assessment/Plan   53-year-old gentleman with end-stage renal disease and variable compliance with hemodialysis here with missed hemodialysis, acute diastolic CHF with pulmonary edema from missed hemodialysis, hypoglycemia and acute encephalopathy secondary to missed dialysis and hypoglycemia.  Hemodynamically stable.  Nephrology feels the patient needs to come in for a couple days for serial sessions of hemodialysis.     1.  CHF diastolic acute from missed hemodialysis.  -Nephrology following   -Hemodialysis overnight, next HD tomorrow      2.  Hypoglycemia.  -Unclear cause, but much improved  -Hypoglycemic protocol in place  -not on diabetic medications.  -Monitor blood sugar closely  -D50 as needed.  -Assess hemoglobin A1c.     3.  Encephalopathy acute.  -Presumably secondary to missed hemodialysis and severe hypoglycemia.  -Mental status improved with treatment of hypoglycemia.     4.  Noncompliance with renal dialysis.  -Encourage compliance with dialysis.     5.  End-stage renal disease.  -Continue hemodialysis.     6.  Anemia of chronic kidney disease.  -Secondary to ESRD.     7.  Secondary hyperparathyroidism.  -Secondary to ESRD.  -Low phosphorus diet.  -Compliance with hemodialysis strongly recommended.     8.  Hypertension.  -Continue antihypertensive medications.  -Low-sodium diet.     9.  Peripheral vascular disease s/p right BKA.  -Continue secondary prevention.     10.  Type 2 diabetes.  -See management for hypoglycemia above  -ADA, SSI     11.  History of dialysis catheter infection.  -History noted.    12.  DVT prevention.  -Subcutaneous heparin.     13.  CODE STATUS.  -Full code.     14.  Disposition.  -Anticipate the patient will require greater than  2 midnights in the hospital.  Anticipate disposition Home in 3 to 4 days.      Active Hospital Problems    Diagnosis Date Noted    CHF (congestive heart failure), NYHA class I, acute on chronic, diastolic (HCC) [I50.33] 02/19/2024    Hypoglycemia [E16.2] 02/19/2024    Encephalopathy acute [G93.40] 02/19/2024    Volume overload [E87.70] 02/19/2024    Chronic anemia [D64.9] 08/27/2022    Hypertension [I10] 06/01/2022    Non-compliance with renal dialysis [Z91.158] 08/27/2021    Hx of BKA, right (HCC) [Z89.511] 01/29/2020    ESRD (end stage renal disease) on dialysis (HCC) [N18.6, Z99.2] 01/29/2020         Subjective:    Patient very sleepy but easily awakes.  Feeling better.    Review of systems:    Constitutional: denies fevers, chills, myalgias  Respiratory: denies SOB, cough  Cardiovascular: denies chest pain, palpitations  Gastrointestinal: denies nausea, vomiting, diarrhea      Vital signs/Intake and Output:  Visit Vitals  BP (!) 156/73   Pulse 89   Temp 98.1 °F (36.7 °C) (Oral)   Resp 20   Ht 1.676 m (5' 6\")   Wt 76.2 kg (167 lb 15.9 oz)   SpO2 100%   BMI 27.11 kg/m²     Current Shift:  No intake/output data recorded.  Last three shifts:  02/18 1901 - 02/20 0700  In: 500   Out: 4000     Exam:    General: Chronically ill-appearing, well developed, alert, NAD, OX3  Head/Neck: NCAT, supple, No masses, No lymphadenopathy  CVS:Regular rate and rhythm, no M/R/G, S1/S2 heard, no thrill  Lungs:Clear to auscultation bilaterally, no wheezes, rhonchi, or rales  Abdomen: Soft, Nontender, No distention, Normal Bowel sounds, No hepatomegaly  Extremities: No C/C/E, pulses palpable 2+  Skin:normal texture and turgor, no rashes, no lesions  Neuro:grossly normal , follows commands  Psych:appropriate    Labs: Results:       Chemistry Recent Labs     02/19/24  1409 02/20/24  0330   GLUCOSE 36* 100*    137   K 5.2 3.7    105   CO2 17* 23   * 52*   CREATININE 16.30* 9.73*   CALCIUM 7.5* 7.8*   BILITOT 0.8  --

## 2024-02-20 NOTE — PROGRESS NOTES
Patient remained hypertensive post HD - un-held labetalol and losartan and administered - MD aware.  BP rechecked and has improved but still elevated.  Will monitor and intervene as appropriate.  Requested PRN medication - will await any new orders.

## 2024-02-20 NOTE — H&P
[facility]    Assessment and Plan:    53-year-old gentleman with end-stage renal disease and variable compliance with hemodialysis here with missed hemodialysis, acute diastolic CHF with pulmonary edema from missed hemodialysis, hypoglycemia and acute encephalopathy secondary to missed dialysis and hypoglycemia.  Hemodynamically stable.  Nephrology feels the patient needs to come in for a couple days for serial sessions of hemodialysis.    1.  CHF diastolic acute from missed hemodialysis.  -Nephrology consulted.  -Hemodialysis tonight.  -Nephrology thinks he will need several days of dialysis.    2.  Hypoglycemia.  -Unclear cause.  -I do not see any diabetic medications.  -Monitor blood sugar closely.  -D50 as needed.  -Assess hemoglobin A1c.    3.  Encephalopathy acute.  -Presumably secondary to missed hemodialysis and severe hypoglycemia.  -Mental status improved with treatment of hypoglycemia.    4.  Noncompliance with renal dialysis.  -Encourage compliance with dialysis.    5.  End-stage renal disease.  -Continue hemodialysis.    6.  Anemia of chronic kidney disease.  -Secondary to ESRD.    7.  Secondary hyperparathyroidism.  -Secondary to ESRD.  -Low phosphorus diet.  -Compliance with hemodialysis strongly recommended.    8.  Hypertension.  -Continue antihypertensive medications.  -Low-sodium diet.    9.  Peripheral vascular disease s/p right BKA.  -Continue secondary prevention.    10.  Type 2 diabetes.  -Follow blood sugar.  -As needed insulin if needed.    11.  History of dialysis catheter infection.  -History noted.  -Dr. Anthony's dc summary dated 10/24/23 reviewed.    12.  DVT prevention.  -Subcutaneous heparin.    13.  CODE STATUS.  -Full code.    14.  Disposition.  -Anticipate the patient will require greater than 2 midnights in the hospital.  Anticipate disposition Home in 3 to 4 days.      CC: Weakness.    HPI:    53-year-old gentleman with a complicated past medical history including  79.5   MCH 24.0 - 34.0 PG 26.4   MCHC 31.0 - 37.0 g/dL 33.2   RDW 11.6 - 14.5 % 20.5 (H)   Platelet Count 135 - 420 K/uL 141   Nucleated Red Blood Cells 0  WBC  0.00 - 0.01 K/uL 0.0  0.00   (L): Data is abnormally low  (H): Data is abnormally high

## 2024-02-20 NOTE — PLAN OF CARE
Problem: Chronic Conditions and Co-morbidities  Goal: Patient's chronic conditions and co-morbidity symptoms are monitored and maintained or improved  Outcome: Progressing    INTERVENTION:  HEMODYNAMIC STABILIZATION  MAINTAIN BP WNL WHILE ON HD.     INTERVENTION:  FLUID MANAGEMENT  WILL ATTEMPT 3500 ML TOTAL FLUID REMOVAL AS TOLERATED.     INTERVENTION:  METABOLIC/ELECTROLYTE MANAGEMENT  2.0 POTASSIUM 2.5 CALCIUM DIALYSATE USED WITH HD TODAY.     INTERVENTION:  HEMODIALYSIS ACCESS SITE MANAGEMENT  RIGHT IJ TDC ACCESSED USING ASEPTIC TECHNIQUE.     GOAL:  SIGNS AND SYMPTOMS OF LISTED POTENTIAL PROBLEMS WILL BE ABSENT OR MANAGEABLE.     OUTCOME:  PROGRESSING.     HD PLANNED FOR 4 HOURS TODAY.

## 2024-02-20 NOTE — PROGRESS NOTES
2015: Pending transfer of pt to room 339.      Received pre HD report from LATRICE Urena RN.  Pt in bed, A+O x4, on O2 via NC @ 3L, no s/s of distress noted.  RN accessed right IJ TDC per protocol.  Tx initiated at 2206.  TDC flowing with ease.  For hemodynamic stability UF goal 4000 ml.  Offered assistance with repositioning every 30 minutes.  Vascular access visible at all times during treatment, line connections intact at all times.  Tx completed at 0206, tolerated well 3.5L removed.  De-accessed per protocol.  Heparin indwell 1.6ml in arterial, and 1.6ml in venous catheter.  Unit nurse given report.

## 2024-02-20 NOTE — PROGRESS NOTES
Case Management Assessment  Initial Evaluation    Date/Time of Evaluation: 2/20/2024 10:59 AM  Assessment Completed by: Norma Gandara    If patient is discharged prior to next notation, then this note serves as note for discharge by case management.    Patient Name: Evan Winters                   YOB: 1970  Diagnosis: Acute pulmonary edema (HCC) [J81.0]  Hypoglycemia [E16.2]  End stage renal disease on dialysis (HCC) [N18.6, Z99.2]  CHF (congestive heart failure), NYHA class I, acute on chronic, diastolic (HCC) [I50.33]                   Date / Time: 2/19/2024  1:41 PM    Patient Admission Status: Inpatient   Readmission Risk (Low < 19, Mod (19-27), High > 27): Readmission Risk Score: 22.8    Current PCP: Skip Pruitt MD  PCP verified by CM? Yes    Chart Reviewed: Yes      History Provided by: Patient  Patient Orientation: Alert and Oriented    Patient Cognition: Alert    Hospitalization in the last 30 days (Readmission):  No    If yes, Readmission Assessment in  Navigator will be completed.    Advance Directives:      Code Status: Full Code   Patient's Primary Decision Maker is:      Primary Decision Maker: WINTERSHEATHER - Brother/Sister - [phone]    Secondary Decision Maker: WINTERS,CAROLINE - Ex-Spouse - [phone]    Discharge Planning:    Patient lives with: Spouse/Significant Other Type of Home: Other (Comment)  Primary Care Giver: Self  Patient Support Systems include: None   Current Financial resources:    Current community resources:    Current services prior to admission: None            Current DME:              Type of Home Care services:  None    ADLS  Prior functional level: Independent in ADLs/IADLs  Current functional level: Independent in ADLs/IADLs    PT AM-PAC:   /24  OT AM-PAC:   /24    Family can provide assistance at DC: No  Would you like Case Management to discuss the discharge plan with any other family members/significant others, and if so, who?    Plans to  Return to Present Housing: Yes  Other Identified Issues/Barriers to RETURNING to current housing: lives alone   Potential Assistance needed at discharge: Home Care            Potential DME:    Patient expects to discharge to: House  Plan for transportation at discharge: Family    Financial    Payor: [employer] MEDICARE / Plan: [employer] MEDICARE ADVANTAGE HMO / Product Type: Medicare /     Does insurance require precert for SNF: No    Potential assistance Purchasing Medications:    Meds-to-Beds request: Yes      [id number] - [place], [place] - [address] - P [phone] - F [phone]  [address] [facility] [address]  Phone: [phone] Fax: [phone]      Notes:    Factors facilitating achievement of predicted outcomes: Family support    Barriers to discharge: n/a    Additional Case Management Notes: Patient admitted from home. Patient lives at home alone and attends dialysis three days a week. Patient stated that he is able to drive himself to and from and is mostly independent. Patient stated that he admitted due low blood sugar. Patient stated that he is able to obtain all meds as needed and has been taking meds regularly. Patient denied any current home care services and or personal care. SW did notify home dialysis unit of acute admission. SW to follow.     The Plan for Transition of Care is related to the following treatment goals of Acute pulmonary edema (HCC) [J81.0]  Hypoglycemia [E16.2]  End stage renal disease on dialysis (HCC) [N18.6, Z99.2]  CHF (congestive heart failure), NYHA class I, acute on chronic, diastolic (HCC) [I50.33]    IF APPLICABLE: The Patient and/or patient representative Evan and his family were provided with a choice of provider and agrees with the discharge plan. Freedom of choice list with basic dialogue that supports the patient's individualized plan of care/goals and shares the quality data associated with the providers was provided to: Patient

## 2024-02-21 PROCEDURE — 6370000000 HC RX 637 (ALT 250 FOR IP): Performed by: INTERNAL MEDICINE

## 2024-02-21 PROCEDURE — 2580000003 HC RX 258: Performed by: INTERNAL MEDICINE

## 2024-02-21 PROCEDURE — 6360000002 HC RX W HCPCS: Performed by: INTERNAL MEDICINE

## 2024-02-21 PROCEDURE — 90935 HEMODIALYSIS ONE EVALUATION: CPT

## 2024-02-21 PROCEDURE — 1100000003 HC PRIVATE W/ TELEMETRY

## 2024-02-21 RX ORDER — LOPERAMIDE HYDROCHLORIDE 2 MG/1
2 CAPSULE ORAL 4 TIMES DAILY PRN
Status: DISCONTINUED | OUTPATIENT
Start: 2024-02-21 | End: 2024-02-23 | Stop reason: HOSPADM

## 2024-02-21 RX ADMIN — SODIUM CHLORIDE, PRESERVATIVE FREE 10 ML: 5 INJECTION INTRAVENOUS at 08:52

## 2024-02-21 RX ADMIN — HYDRALAZINE HYDROCHLORIDE 100 MG: 50 TABLET, FILM COATED ORAL at 05:28

## 2024-02-21 RX ADMIN — HEPARIN SODIUM 5000 UNITS: 5000 INJECTION INTRAVENOUS; SUBCUTANEOUS at 05:28

## 2024-02-21 RX ADMIN — SODIUM CHLORIDE, PRESERVATIVE FREE 10 ML: 5 INJECTION INTRAVENOUS at 21:06

## 2024-02-21 RX ADMIN — CALCITRIOL 0.5 MCG: 0.25 CAPSULE ORAL at 08:51

## 2024-02-21 RX ADMIN — HYDRALAZINE HYDROCHLORIDE 100 MG: 50 TABLET, FILM COATED ORAL at 21:03

## 2024-02-21 RX ADMIN — HEPARIN SODIUM 5000 UNITS: 5000 INJECTION INTRAVENOUS; SUBCUTANEOUS at 21:03

## 2024-02-21 RX ADMIN — LABETALOL HYDROCHLORIDE 200 MG: 200 TABLET, FILM COATED ORAL at 21:03

## 2024-02-21 RX ADMIN — SEVELAMER CARBONATE 800 MG: 800 TABLET, FILM COATED ORAL at 11:41

## 2024-02-21 ASSESSMENT — PAIN SCALES - GENERAL
PAINLEVEL_OUTOF10: 0

## 2024-02-21 NOTE — PROGRESS NOTES
conducted an initial consultation and spiritual assessment for Evan Huizar, who is a 53 y.o.,male.     Initiated a relationship of care and support.   Explored issues of bibi, belief, spirituality and Hindu/ritual needs.  Listened empathically.  Provided information about Spiritual Care Services.   confirmed Patient's Hinduism Affiliation.  Patient processed feelings about current hospitalization.  Offered prayer and assurance of continued prayers on patients behalf.   Chart reviewed.  Patient expressed gratitude for Spiritual Care visit.    Chaplains will continue to follow and will provide pastoral care as needed or requested.    recommends bedside caregivers page  on duty if patient shows signs of acute spiritual or emotional distress.    Chaplain Emmie Hathaway M.Div.    [phone] - Office

## 2024-02-21 NOTE — PROGRESS NOTES
hypoglycemia above  -ADA, SSI     11.  History of dialysis catheter infection.  -History noted.    12.  DVT prevention.  -Subcutaneous heparin.     13.  CODE STATUS.  -Full code.     14.  Disposition.  -Anticipate the patient will require greater than 2 midnights in the hospital.  Anticipate disposition Home in 3 to 4 days.      Active Hospital Problems    Diagnosis Date Noted    CHF (congestive heart failure), NYHA class I, acute on chronic, diastolic (HCC) [I50.33] 02/19/2024    Hypoglycemia [E16.2] 02/19/2024    Encephalopathy acute [G93.40] 02/19/2024    Volume overload [E87.70] 02/19/2024    Chronic anemia [D64.9] 08/27/2022    Hypertension [I10] 06/01/2022    Non-compliance with renal dialysis [Z91.158] 08/27/2021    Hx of BKA, right (HCC) [Z89.511] 01/29/2020    ESRD (end stage renal disease) on dialysis (HCC) [N18.6, Z99.2] 01/29/2020         Subjective:    Patient very sleepy but easily awakes.  No acute complaints     Review of systems:    Constitutional: denies fevers, chills, myalgias  Respiratory: denies SOB, cough  Cardiovascular: denies chest pain, palpitations  Gastrointestinal: denies nausea, vomiting, diarrhea      Vital signs/Intake and Output:  Visit Vitals  BP (!) 152/72   Pulse 86   Temp 98.2 °F (36.8 °C) (Oral)   Resp 18   Ht 1.676 m (5' 6\")   Wt 76.3 kg (168 lb 4.8 oz)   SpO2 93%   BMI 27.16 kg/m²     Current Shift:  02/21 0701 - 02/21 1900  In: 120 [P.O.:120]  Out: -   Last three shifts:  02/19 1901 - 02/21 0700  In: 3000 [I.V.:2000]  Out: 6500     Exam:    General: Chronically ill-appearing, well developed, alert, NAD, OX3  Head/Neck: NCAT, supple, No masses, No lymphadenopathy  CVS:Regular rate and rhythm, no M/R/G, S1/S2 heard, no thrill  Lungs:Clear to auscultation bilaterally, no wheezes, rhonchi, or rales  Abdomen: Soft, Nontender, No distention, Normal Bowel sounds, No hepatomegaly  Extremities: No C/C/E, pulses palpable 2+  Skin:normal texture and turgor, no rashes, no  medications, tests, or procedures  Referring and communicating with other health care professionals as needed  Documenting clinical information in the electronic or other health record  Independently interpreting results (not reported separately) and communicating results to the patient/family/caregiver  Care coordination and discharge planning with Case Management.

## 2024-02-21 NOTE — PROGRESS NOTES
RENAL CONSULT PROGRESS NOTE   2024    Patient:  Evan Huizar  :  1970  Gender:  male  MRN #:  [id number]    Reason for Consult: encephalopathy, pulmonary edema and ESRD     SUBJECTIVE:   Says he feels little better, still dyspnea with exertion  No chest pain   No fever or diarrhea       Objective:    BP (!) 139/53   Pulse 87   Temp 98.1 °F (36.7 °C) (Oral)   Resp 18   Ht 1.676 m (5' 6\")   Wt 76.3 kg (168 lb 4.8 oz)   SpO2 93%   BMI 27.16 kg/m²     Physical Exam:    Pt awake,  alert and comfortable  Lung: clear to auscultation  Ext: no edema   CNS- Oriented to time , place and person     Laboratory Data:    Lab Results   Component Value Date    BUN 52 (H) 2024     (H) 2024    BUN 16 10/25/2023     2024     2024     10/25/2023    CO2 23 2024    CO2 17 (L) 2024    CO2 31 10/25/2023    GLUCOSE 100 (H) 2024    GLUCOSE 36 (LL) 2024    GLUCOSE 116 (H) 10/25/2023     Lab Results   Component Value Date    WBC 5.3 2024    HGB 10.8 (L) 2024    HCT 31.1 (L) 2024     Lab Results   Component Value Date    CALCIUM 7.8 (L) 2024     Lab Results   Component Value Date    HDL 62 (H) 2022     No results found for: \"SPECIMENTYP\", \"TURBIDITY\"    Imaging Reveiwed:  CAT chest :-     IMPRESSION:  Pulmonary edema. Small bilateral pleural effusions with underlying  atelectasis.       Assessment:    Evan Huizar is a 53 y.o. year old male ESRD on HD,  DM-2, anemia, uncontrolled hypertension , hyperphosphatemia non compliance to dialysis prescription, dietary/life style modification was brought confused and encephalopathic / He had missed dialysis last week Friday   Was found to have hypoglycemia , encephalopathic with uncontrolled BP   CT chest showed- pulmonary edema    Pulmonary edema  Hypoglycemia/Encephalopathy  Volume overload  Uncontrolled Hypertension  Anemia  ESRD  Hyperphosphatemia        Plan:    Patient  examined and labs reviewed. Will plan for dialysis today for volume removal and clearance   Next dialysis on Friday     Salt and potassium restricted diet .      Uncontrolled Hypertension: BP is always high as outpatient due to non adherence , BP in house is more stable   Continue amlodipine,losartan , labetalol , and  Hydralazine.     Epogen for anemia of CKD during HD as indicated    Transfusion to keep hemoglobin above 7 gram/dl      BMD:- calcitriol for hypocalcemia   Sevelamer for hyperphosphatemia        Rakesh Vincent MD   [facility]- Nephrology

## 2024-02-21 NOTE — DIALYSIS
During treatment pt stated he had to utilized the restroom, I notified him that  his treatment has initiated and in order for him to use the restroom he would have to use a bedpan, he said he didn't want to use the bed pan and stood up from his bed and used the commode I immediately notified my charge nurse after she was notified she notified the patient that next time this happens his treatment will be terminated

## 2024-02-22 LAB — GLUCOSE BLD STRIP.AUTO-MCNC: 94 MG/DL (ref 70–110)

## 2024-02-22 PROCEDURE — 6370000000 HC RX 637 (ALT 250 FOR IP): Performed by: INTERNAL MEDICINE

## 2024-02-22 PROCEDURE — 82962 GLUCOSE BLOOD TEST: CPT

## 2024-02-22 PROCEDURE — 6360000002 HC RX W HCPCS: Performed by: INTERNAL MEDICINE

## 2024-02-22 PROCEDURE — 1100000003 HC PRIVATE W/ TELEMETRY

## 2024-02-22 PROCEDURE — 2580000003 HC RX 258: Performed by: INTERNAL MEDICINE

## 2024-02-22 RX ADMIN — SEVELAMER CARBONATE 800 MG: 800 TABLET, FILM COATED ORAL at 08:24

## 2024-02-22 RX ADMIN — HEPARIN SODIUM 5000 UNITS: 5000 INJECTION INTRAVENOUS; SUBCUTANEOUS at 20:54

## 2024-02-22 RX ADMIN — HYDRALAZINE HYDROCHLORIDE 100 MG: 50 TABLET, FILM COATED ORAL at 14:08

## 2024-02-22 RX ADMIN — HEPARIN SODIUM 5000 UNITS: 5000 INJECTION INTRAVENOUS; SUBCUTANEOUS at 06:39

## 2024-02-22 RX ADMIN — AMLODIPINE BESYLATE 10 MG: 5 TABLET ORAL at 08:24

## 2024-02-22 RX ADMIN — HEPARIN SODIUM 5000 UNITS: 5000 INJECTION INTRAVENOUS; SUBCUTANEOUS at 14:08

## 2024-02-22 RX ADMIN — CALCITRIOL 0.5 MCG: 0.25 CAPSULE ORAL at 08:23

## 2024-02-22 RX ADMIN — SEVELAMER CARBONATE 800 MG: 800 TABLET, FILM COATED ORAL at 11:22

## 2024-02-22 RX ADMIN — LABETALOL HYDROCHLORIDE 200 MG: 200 TABLET, FILM COATED ORAL at 20:54

## 2024-02-22 RX ADMIN — SEVELAMER CARBONATE 800 MG: 800 TABLET, FILM COATED ORAL at 16:07

## 2024-02-22 RX ADMIN — HYDRALAZINE HYDROCHLORIDE 100 MG: 50 TABLET, FILM COATED ORAL at 20:54

## 2024-02-22 RX ADMIN — HYDRALAZINE HYDROCHLORIDE 100 MG: 50 TABLET, FILM COATED ORAL at 06:39

## 2024-02-22 RX ADMIN — LOSARTAN POTASSIUM 100 MG: 50 TABLET, FILM COATED ORAL at 08:24

## 2024-02-22 RX ADMIN — LABETALOL HYDROCHLORIDE 200 MG: 200 TABLET, FILM COATED ORAL at 08:24

## 2024-02-22 RX ADMIN — SODIUM CHLORIDE, PRESERVATIVE FREE 10 ML: 5 INJECTION INTRAVENOUS at 08:25

## 2024-02-22 NOTE — PLAN OF CARE
Problem: Discharge Planning  Goal: Discharge to home or other facility with appropriate resources  Outcome: Progressing  Flowsheets (Taken 2/22/2024 0712)  Discharge to home or other facility with appropriate resources: Identify barriers to discharge with patient and caregiver     Problem: Safety - Adult  Goal: Free from fall injury  Outcome: Progressing     Problem: Chronic Conditions and Co-morbidities  Goal: Patient's chronic conditions and co-morbidity symptoms are monitored and maintained or improved  Outcome: Progressing  Flowsheets (Taken 2/22/2024 0712)  Care Plan - Patient's Chronic Conditions and Co-Morbidity Symptoms are Monitored and Maintained or Improved: Monitor and assess patient's chronic conditions and comorbid symptoms for stability, deterioration, or improvement     Problem: Pain  Goal: Verbalizes/displays adequate comfort level or baseline comfort level  Outcome: Progressing

## 2024-02-22 NOTE — PLAN OF CARE
Intervention: Monitor/Manage Fluid Electrolyte/Acid Base Balance     PROBLEM: HEMODIALYSIS ADULT     INTERVENTION: Hemodynamic stabilization  Maintain BP WNL for this patient while on hemodialysis     INTERVENTION: Fluid Management  Will attempt 3000 ml total fluid removal as tolerated     INTERVENTION: Metabolic / Electrolyte Imbalance Management  3K+ potassium, 2.5 Ca calcium bath today with dialysis     GOAL: Signs and symptoms of listed potential problems will be absent or manageable  (reference Hemodialysis ADULT CPG)     OUTCOME: Progressing  HD planned for 3 hours today        Problem: Chronic Renal Failure  Goal: *Fluid and electrolytes stabilized  Outcome: Progressing Towards Goal     Problem: Patient Education: Go to Patient Education Activity  Goal: Patient/Family Education  Outcome: Progressing Towards Goal

## 2024-02-22 NOTE — PROGRESS NOTES
Hospitalist Progress Note    Patient: Evan Huizar MRN: [id number]  CSN: [id number]    YOB: 1970  Age: 53 y.o.  Sex: male    DOA: 2/19/2024 LOS:  LOS: 3 days          Chief Complaint:    Low BG      Assessment/Plan     53-year-old gentleman with end-stage renal disease and variable compliance with hemodialysis here with missed hemodialysis, acute diastolic CHF with pulmonary edema from missed hemodialysis, hypoglycemia and acute encephalopathy secondary to missed dialysis and hypoglycemia.            CHF diastolic acute from missed hemodialysis.  -Nephrology following   -Continue salt and potassium restricted diet     Hypoglycemia.  Resolved  Add dietary consult  Snack at night  Add renal supplements BF/dinner     Encephalopathy acute.  -Presumably secondary to missed hemodialysis and severe hypoglycemia.  resolved     Noncompliance with renal dialysis.  -Encourage compliance with dialysis.     End-stage renal disease.  -Continue hemodialysis. Next session 2/23     Anemia of chronic kidney disease.  -Secondary to ESRD.     Secondary hyperparathyroidism.  -Secondary to ESRD.  -Low phosphorus diet.  -Continue Sevelamer for hyperphosphatemia per renal  -calcitriol for hypocalcemia per renal     Hypertension, uncontrolled  -Patient has significant issue with nonadherence to blood pressure medication regimen  -Continue amlodipine, losartan, labetalol and hydralazine     Peripheral vascular disease s/p right BKA.  -Continue secondary prevention.     Type 2 diabetes.  -See management for hypoglycemia above       DVT proph-Subcutaneous heparin.    -Full code.       Disposition :2 days, home  Active Hospital Problems    Diagnosis     CHF (congestive heart failure), NYHA class I, acute on chronic, diastolic (HCC) [I50.33]     Hypoglycemia [E16.2]     Encephalopathy acute [G93.40]     Volume overload [E87.70]     Chronic anemia [D64.9]     Hypertension [I10]     Non-compliance with renal dialysis [Z91.158]      Hx of BKA, right (HCC) [Z89.511]     ESRD (end stage renal disease) on dialysis ([facility]) [N18.6, Z99.2]        Subjective:    Feeling better  States he missed bedtime snack when BG dropped    Review of systems:    Constitutional: denies fevers, chills  Respiratory: denies SOB, cough  Cardiovascular: denies chest pain  Gastrointestinal: denies nausea, vomiting, diarrhea      Vital signs/Intake and Output:  Visit Vitals  BP (!) 135/92   Pulse 87   Temp 98.4 °F (36.9 °C) (Oral)   Resp 18   Ht 1.676 m (5' 6\")   Wt 78.3 kg (172 lb 9.9 oz)   SpO2 92%   BMI 27.86 kg/m²     Current Shift:  02/22 0701 - 02/22 1900  In: 360 [P.O.:360]  Out: -   Last three shifts:  02/20 1901 - 02/22 0700  In: 620 [P.O.:120]  Out: 3000     Exam:    General: Well developed, alert, NAD, OX3  CVS:Regular rate and rhythm, no M/R/G, S1/S2 heard, no thrill  Lungs:Clear to auscultation bilaterally, no wheezes, rhonchi, or rales  Abdomen: Soft, Nontender, No distention, Normal Bowel sounds, No hepatomegaly  Extremities:right BKA  Neuro:grossly normal , follows commands  Psych:appropriate    Labs: Results:       Chemistry Recent Labs     02/19/24  1409 02/20/24  0330   GLUCOSE 36* 100*    137   K 5.2 3.7    105   CO2 17* 23   * 52*   CREATININE 16.30* 9.73*   CALCIUM 7.5* 7.8*   BILITOT 0.8  --    AST 11  --    ALT 20  --    ALKPHOS 94  --    PROT 8.5*  --    GLOB 4.9*  --    AGRATIO 0.7*  --    LABGLOM 3* 6*      CBC w/Diff Recent Labs     02/19/24  1409 02/20/24  0330   WBC 5.2 5.3   RBC 4.43 4.07*   HGB 11.7* 10.8*   HCT 35.2* 31.1*    132*   LYMPHOPCT  --  9*      Cardiac Enzymes No results for input(s): \"CKTOTAL\", \"CKMB\", \"CKMBINDEX\", \"TROPONINI\", \"FLOYD\" in the last 72 hours.   Coagulation No results for input(s): \"PROTIME\", \"INR\", \"APTT\" in the last 72 hours.    Lipid Panel Lab Results   Component Value Date/Time    CHOL 115 06/02/2022 09:45 AM    TRIG 35 06/02/2022 09:45 AM    HDL 62 06/02/2022 09:45 AM    LDLCALC 46

## 2024-02-22 NOTE — CONSULTS
Comprehensive Nutrition Assessment      Type and Reason for Visit:  Initial, Consult, Patient Education    Nutrition Recommendations/Plan:   Diet as ordered,   Initiate nutrition education on renal diet (low K+, Na+, and phos)  Monitor wt for trend  Continue to monitor tolerance of PO, weight, labs, and plan of care during admission.         Malnutrition Assessment:  Malnutrition Status:  Moderate malnutrition (02/22/24 1606)    Context:  Chronic Illness     Findings of the 6 clinical characteristics of malnutrition:  Energy Intake:  No significant decrease in energy intake  Weight Loss:  Unable to assess     Body Fat Loss:  Severe body fat loss Triceps   Muscle Mass Loss:  Mild muscle mass loss Calf (gastrocnemius)  Fluid Accumulation:  No significant fluid accumulation     Strength:  Normal  strength    Nutrition Assessment:      Evan Huizar is a 53 y.o. male  admitted on 2/19 for Hypoglycemia  PMhx of Chronic kidney disease on HD, Diabetes, CHF, HTN, Psychiatric illness, and Sickle cell trait (HCC).   Per visit pt is sitting in bed, alert and oriented , and seem weak  Wt fluctuation over the past year- r/t fluid status per pt. Used to wt over 400lbs x 5yr ago.   Current diet tolerating well -  po intake %.   Last BM (including prior to admit): 02/22/24  Edema: None  Edema Generalized: Trace     Nutrition education on Renal diet provide and accepted. Yet, education reinforcement is needed and out-pt nutrition are highly recommend    Wt Readings from Last 10 Encounters:   02/21/24 78.3 kg (172 lb 9.9 oz)   10/25/23 76.2 kg (168 lb)   10/23/23 73.6 kg (162 lb 4.1 oz)   07/21/23 83.7 kg (184 lb 8.4 oz)   05/16/23 80.7 kg (177 lb 14.6 oz)   04/23/23 76.2 kg (168 lb)   04/14/23 86.6 kg (190 lb 14.7 oz)   02/10/23 77.5 kg (170 lb 13.7 oz)   06/07/22 79 kg (174 lb 1.6 oz)        Nutrition Related Findings:    Pertinent meds: hydralazine, calcitriols. Meds reviewed   Wound Type: Pressure Injury (trauma on  Outcomes: Knowledge or Skill  Food/Nutrient Intake Outcomes: Food and Nutrient Intake  Physical Signs/Symptoms Outcomes: Biochemical Data, Fluid Status or Edema, Weight    Discharge Planning:    Continue current diet, Recommend pursue outpatient nutrition counseling, Coordination of community care     Zully Nicole RD  Contact: [phone]

## 2024-02-22 NOTE — PROGRESS NOTES
RENAL CONSULT PROGRESS NOTE   2024    Patient:  Evan Huizar  :  1970  Gender:  male  MRN #:  [id number]    Reason for Consult: encephalopathy, pulmonary edema and ESRD     SUBJECTIVE:   Says he feels better today, diarrhea improved   No chest pain   No fever       Objective:    BP (!) 135/92   Pulse 87   Temp 98.4 °F (36.9 °C) (Oral)   Resp 18   Ht 1.676 m (5' 6\")   Wt 78.3 kg (172 lb 9.9 oz)   SpO2 92%   BMI 27.86 kg/m²     Physical Exam:    Pt awake,  alert and comfortable  Lung: clear to auscultation  Ext: no edema   CNS- Oriented to time , place and person     Laboratory Data:    Lab Results   Component Value Date    BUN 52 (H) 2024     (H) 2024    BUN 16 10/25/2023     2024     2024     10/25/2023    CO2 23 2024    CO2 17 (L) 2024    CO2 31 10/25/2023    GLUCOSE 100 (H) 2024    GLUCOSE 36 (LL) 2024    GLUCOSE 116 (H) 10/25/2023     Lab Results   Component Value Date    WBC 5.3 2024    HGB 10.8 (L) 2024    HCT 31.1 (L) 2024     Lab Results   Component Value Date    CALCIUM 7.8 (L) 2024     Lab Results   Component Value Date    HDL 62 (H) 2022     No results found for: \"SPECIMENTYP\", \"TURBIDITY\"    Imaging Reveiwed:  CAT chest :-     IMPRESSION:  Pulmonary edema. Small bilateral pleural effusions with underlying  atelectasis.       Assessment:    Evan Huizar is a 53 y.o. year old male ESRD on HD,  DM-2, anemia, uncontrolled hypertension , hyperphosphatemia non compliance to dialysis prescription, dietary/life style modification was brought confused and encephalopathic / He had missed dialysis last week Friday   Was found to have hypoglycemia , encephalopathic with uncontrolled BP   CT chest showed- pulmonary edema    Pulmonary edema  Hypoglycemia/Encephalopathy  Volume overload  Uncontrolled Hypertension  Anemia  ESRD  Hyperphosphatemia        Plan:    Patient examined and labs reviewed.

## 2024-02-22 NOTE — PROGRESS NOTES
Pre-dialysis handoff received from JESSE Troncoso RN. Pt resting between care. R IJ td patent.  KATHY Woods, tech monitoring tx today. This RN wearing PPE.

## 2024-02-22 NOTE — PROGRESS NOTES
Hemodialysis at bedside. 3 hrs completed. Vss during tx. 3K+ and 2.5 dialysate used today. R IJ access patent. 3 Liters removed. Spoke with Nephrologist Carlton concerning tx progress. Pt remained in bed and complete his tx. No c/o cramping or discomfort related to dialysis. Post HD report given to JESSE Troncoso RN.

## 2024-02-23 VITALS
SYSTOLIC BLOOD PRESSURE: 155 MMHG | RESPIRATION RATE: 18 BRPM | HEART RATE: 83 BPM | WEIGHT: 172.62 LBS | HEIGHT: 66 IN | DIASTOLIC BLOOD PRESSURE: 96 MMHG | TEMPERATURE: 98 F | BODY MASS INDEX: 27.74 KG/M2 | OXYGEN SATURATION: 99 %

## 2024-02-23 PROCEDURE — 2580000003 HC RX 258: Performed by: INTERNAL MEDICINE

## 2024-02-23 PROCEDURE — 6370000000 HC RX 637 (ALT 250 FOR IP): Performed by: INTERNAL MEDICINE

## 2024-02-23 PROCEDURE — 90935 HEMODIALYSIS ONE EVALUATION: CPT

## 2024-02-23 PROCEDURE — 6360000002 HC RX W HCPCS: Performed by: INTERNAL MEDICINE

## 2024-02-23 RX ADMIN — SODIUM CHLORIDE, PRESERVATIVE FREE 10 ML: 5 INJECTION INTRAVENOUS at 08:05

## 2024-02-23 RX ADMIN — SEVELAMER CARBONATE 800 MG: 800 TABLET, FILM COATED ORAL at 08:05

## 2024-02-23 RX ADMIN — CALCITRIOL 0.5 MCG: 0.25 CAPSULE ORAL at 08:05

## 2024-02-23 RX ADMIN — HEPARIN SODIUM 5000 UNITS: 5000 INJECTION INTRAVENOUS; SUBCUTANEOUS at 06:26

## 2024-02-23 RX ADMIN — HYDRALAZINE HYDROCHLORIDE 100 MG: 50 TABLET, FILM COATED ORAL at 06:26

## 2024-02-23 ASSESSMENT — PAIN SCALES - GENERAL: PAINLEVEL_OUTOF10: 0

## 2024-02-23 NOTE — PROGRESS NOTES
Patient aware that he will DC home after dialysis. Patient will need a lyft home after session is over. Patient agreeable with DC home.

## 2024-02-23 NOTE — PLAN OF CARE
Problem: Chronic Conditions and Co-morbidities  Goal: Patient's chronic conditions and co-morbidity symptoms are monitored and maintained or improved  Outcome: Progressing  Flowsheets (Taken 2/23/2024 0800 by Flores Crawford RN)  Care Plan - Patient's Chronic Conditions and Co-Morbidity Symptoms are Monitored and Maintained or Improved:   Monitor and assess patient's chronic conditions and comorbid symptoms for stability, deterioration, or improvement   Collaborate with multidisciplinary team to address chronic and comorbid conditions and prevent exacerbation or deterioration   Update acute care plan with appropriate goals if chronic or comorbid symptoms are exacerbated and prevent overall improvement and discharge   Receiving dialysis today

## 2024-02-23 NOTE — PLAN OF CARE
Problem: Discharge Planning  Goal: Discharge to home or other facility with appropriate resources  Outcome: Progressing  Flowsheets (Taken 2/23/2024 0800)  Discharge to home or other facility with appropriate resources:   Identify barriers to discharge with patient and caregiver   Arrange for needed discharge resources and transportation as appropriate     Problem: Safety - Adult  Goal: Free from fall injury  Outcome: Progressing     Problem: Chronic Conditions and Co-morbidities  Goal: Patient's chronic conditions and co-morbidity symptoms are monitored and maintained or improved  2/23/2024 1640 by Flores Crawford, RN  Outcome: Progressing  2/23/2024 1506 by Essence Gunderson RN  Outcome: Progressing  Flowsheets (Taken 2/23/2024 0800 by Flores Crawford, RN)  Care Plan - Patient's Chronic Conditions and Co-Morbidity Symptoms are Monitored and Maintained or Improved:   Monitor and assess patient's chronic conditions and comorbid symptoms for stability, deterioration, or improvement   Collaborate with multidisciplinary team to address chronic and comorbid conditions and prevent exacerbation or deterioration   Update acute care plan with appropriate goals if chronic or comorbid symptoms are exacerbated and prevent overall improvement and discharge     Problem: Pain  Goal: Verbalizes/displays adequate comfort level or baseline comfort level  Outcome: Progressing     Problem: Skin/Tissue Integrity  Goal: Absence of new skin breakdown  Description: 1.  Monitor for areas of redness and/or skin breakdown  2.  Assess vascular access sites hourly  3.  Every 4-6 hours minimum:  Change oxygen saturation probe site  4.  Every 4-6 hours:  If on nasal continuous positive airway pressure, respiratory therapy assess nares and determine need for appliance change or resting period.  Outcome: Progressing

## 2024-02-23 NOTE — DIALYSIS
02/23/24  Dialysis treatment one by Altaf Woods ([facility] tech)  Code Status-Full  Diagnosis-CHF    Treatment time-3hrs ,per pt request, Dr Vincent informed  Fluid removed-  BVP-65.3  Medications given-  Hepatitis Status-7/14/23 AB-Imm    Access-  Rt IJ CVC, accessed by KATHY Woods    Treatment-  1425 Dialysis started. Pt encouraged an educated on running his full time but patient still only wants to run 3hrs today  1730 Dialysis completed and blood rinsed back. New caps placed on each port, pt stable    Pre and Post Report given Aubree wheeler RN

## 2024-02-23 NOTE — PROGRESS NOTES
Patient to DC home when stable. Patient established with dialysis unit MW. Patient will need a lyft home when stable.

## 2024-02-23 NOTE — PROGRESS NOTES
examined and labs reviewed. Plan for dialysis today for volume removal and clearance .  Next dialysis on Friday     Salt and potassium restricted diet .      Uncontrolled Hypertension: BP in house is more stable   Continue amlodipine,losartan , labetalol , and  Hydralazine.     Epogen for anemia of CKD during HD as indicated    Transfusion to keep hemoglobin above 7 gram/dl      BMD:- calcitriol for hypocalcemia   Sevelamer for hyperphosphatemia      Stable for discharge from renal perspective after dialysis today     Rakesh Vincent MD   [facility]

## 2024-02-23 NOTE — DISCHARGE SUMMARY
Discharge Summary    Patient: Evan Huizar MRN: [id number]  CSN: [id number]    YOB: 1970  Age: 53 y.o.  Sex: male    DOA: 2/19/2024 LOS:  LOS: 4 days   Discharge Date:      Primary Care Provider:  Skip Pruitt MD    Admission Diagnoses: Acute pulmonary edema (HCC) [J81.0]  Hypoglycemia [E16.2]  End stage renal disease on dialysis (HCC) [N18.6, Z99.2]  CHF (congestive heart failure), NYHA class I, acute on chronic, diastolic (HCC) [I50.33]    Discharge Diagnoses:    Active Hospital Problems    Diagnosis Date Noted    CHF (congestive heart failure), NYHA class I, acute on chronic, diastolic (HCC) [I50.33] 02/19/2024    Hypoglycemia [E16.2] 02/19/2024    Encephalopathy acute [G93.40] 02/19/2024    Volume overload [E87.70] 02/19/2024    Chronic anemia [D64.9] 08/27/2022    Hypertension [I10] 06/01/2022    Non-compliance with renal dialysis [Z91.158] 08/27/2021    Hx of BKA, right ([facility]) [Z89.511] 01/29/2020    ESRD (end stage renal disease) on dialysis (HCC) [N18.6, Z99.2] 01/29/2020       Discharge Medications:        Medication List        CONTINUE taking these medications      amLODIPine 10 MG tablet  Commonly known as: NORVASC  Take 1 tablet by mouth daily     calcitRIOL 0.5 MCG capsule  Commonly known as: ROCALTROL  Take 1 capsule by mouth daily     cyanocobalamin 1000 MCG tablet     hydrALAZINE 100 MG tablet  Commonly known as: APRESOLINE  Take 1 tablet by mouth every 8 hours     labetalol 200 MG tablet  Commonly known as: NORMODYNE  Take 1 tablet by mouth 2 times daily     losartan 100 MG tablet  Commonly known as: Cozaar  Take 1 tablet by mouth daily     sevelamer 800 MG tablet  Commonly known as: RENVELA                Discharge Condition: Stable    Procedures : none    Consults: Nephrology      PHYSICAL EXAM   Visit Vitals  BP (!) 155/96   Pulse 83   Temp 98 °F (36.7 °C)   Resp 18   Ht 1.676 m (5' 6\")   Wt 78.3 kg (172 lb 9.9 oz)   SpO2 99%   BMI 27.86 kg/m²     General: Awake,  visit.           CC: Skip Pruitt MD

## 2024-02-23 NOTE — PROGRESS NOTES
Hospitalist Progress Note    Patient: Evan Huizar MRN: [id number]  CSN: [id number]    YOB: 1970  Age: 53 y.o.  Sex: male    DOA: 2/19/2024 LOS:  LOS: 4 days          Chief Complaint:    Low BG      Assessment/Plan     53-year-old gentleman with end-stage renal disease and variable compliance with hemodialysis here with missed hemodialysis, acute diastolic CHF with pulmonary edema from missed hemodialysis, hypoglycemia and acute encephalopathy secondary to missed dialysis and hypoglycemia.     CHF diastolic acute from missed hemodialysis.  -Nephrology following   -Continue salt and potassium restricted diet     Hypoglycemia.  Resolved  Add dietary consult  Snack at night  Add renal supplements BF/dinner     Encephalopathy acute.  -Presumably secondary to missed hemodialysis and severe hypoglycemia.  resolved     Noncompliance with renal dialysis.  -Encourage compliance with dialysis.     End-stage renal disease.  -Continue hemodialysis. Next session 2/23     Anemia of chronic kidney disease.  -Secondary to ESRD.     Secondary hyperparathyroidism.  -Secondary to ESRD.  -Low phosphorus diet.  -Continue Sevelamer for hyperphosphatemia per renal  -calcitriol for hypocalcemia per renal     Hypertension, uncontrolled  -Patient has significant issue with nonadherence to blood pressure medication regimen  -Continue amlodipine, losartan, labetalol and hydralazine     Peripheral vascular disease s/p right BKA.  -Continue secondary prevention.     Type 2 diabetes.  -See management for hypoglycemia above       DVT proph-Subcutaneous heparin.    -Full code.     Disposition: Nephrology has cleared pt for DC after HD today   Active Hospital Problems    Diagnosis     CHF (congestive heart failure), NYHA class I, acute on chronic, diastolic (HCC) [I50.33]     Hypoglycemia [E16.2]     Encephalopathy acute [G93.40]     Volume overload [E87.70]     Chronic anemia [D64.9]     Hypertension [I10]     Non-compliance with  AM      BNP No results for input(s): \"BNP\" in the last 72 hours.   Liver Enzymes No results for input(s): \"ALT\", \"AST\", \"GGT\", \"ALKPHOS\", \"BILITOT\", \"BILIDIR\" in the last 72 hours.     Thyroid Studies Lab Results   Component Value Date/Time    TSH 2.38 08/29/2021 01:20 AM          Procedures/imaging: see electronic medical records for all procedures/Xrays and details which were not copied into this note but were reviewed prior to creation of Plan      Arturo Goldstein MD

## 2024-02-23 NOTE — PLAN OF CARE
Problem: Discharge Planning  Goal: Discharge to home or other facility with appropriate resources  2/22/2024 2257 by Donya Carpenter RN  Outcome: Progressing  Flowsheets (Taken 2/22/2024 2045)  Discharge to home or other facility with appropriate resources:   Identify barriers to discharge with patient and caregiver   Arrange for needed discharge resources and transportation as appropriate   Identify discharge learning needs (meds, wound care, etc)  2/22/2024 0951 by Rosa Mayberry RN  Outcome: Progressing  Flowsheets (Taken 2/22/2024 0712)  Discharge to home or other facility with appropriate resources: Identify barriers to discharge with patient and caregiver     Problem: Safety - Adult  Goal: Free from fall injury  2/22/2024 2257 by Donya Carpenter RN  Outcome: Progressing  2/22/2024 0951 by Rosa Mayberry RN  Outcome: Progressing     Problem: Chronic Conditions and Co-morbidities  Goal: Patient's chronic conditions and co-morbidity symptoms are monitored and maintained or improved  2/22/2024 2257 by Donya Carpenter RN  Outcome: Progressing  Flowsheets (Taken 2/22/2024 2045)  Care Plan - Patient's Chronic Conditions and Co-Morbidity Symptoms are Monitored and Maintained or Improved:   Monitor and assess patient's chronic conditions and comorbid symptoms for stability, deterioration, or improvement   Collaborate with multidisciplinary team to address chronic and comorbid conditions and prevent exacerbation or deterioration  2/22/2024 0951 by Rosa Mayberry RN  Outcome: Progressing  Flowsheets (Taken 2/22/2024 0712)  Care Plan - Patient's Chronic Conditions and Co-Morbidity Symptoms are Monitored and Maintained or Improved: Monitor and assess patient's chronic conditions and comorbid symptoms for stability, deterioration, or improvement     Problem: Pain  Goal: Verbalizes/displays adequate comfort level or baseline comfort level  2/22/2024 2257 by Donya Carpenter RN  Outcome: Progressing  Flowsheets

## 2024-02-24 NOTE — PROGRESS NOTES
Pt states readiness for discharge. VSS. IV and tele box removed. Discharged papers reviewed with patient. Patient demonstrated understanding. Belongings returned to patient. Patient discharged at this time and taken down to caregiver in wheelchair.

## 2024-02-24 NOTE — PLAN OF CARE
Problem: Discharge Planning  Goal: Discharge to home or other facility with appropriate resources  2/23/2024 2012 by Mathew Quevedo RN  Outcome: Adequate for Discharge  2/23/2024 1640 by Flores Crawford RN  Outcome: Progressing  Flowsheets (Taken 2/23/2024 0800)  Discharge to home or other facility with appropriate resources:   Identify barriers to discharge with patient and caregiver   Arrange for needed discharge resources and transportation as appropriate     Problem: Safety - Adult  Goal: Free from fall injury  2/23/2024 2012 by Mathew Quevedo RN  Outcome: Adequate for Discharge  2/23/2024 1640 by Flores Crawford RN  Outcome: Progressing     Problem: Chronic Conditions and Co-morbidities  Goal: Patient's chronic conditions and co-morbidity symptoms are monitored and maintained or improved  2/23/2024 2012 by Mathew Quevedo RN  Outcome: Adequate for Discharge  2/23/2024 1640 by Flores Crawford RN  Outcome: Progressing  2/23/2024 1506 by Essence Gunderson RN  Outcome: Progressing  Flowsheets (Taken 2/23/2024 0800 by Flores Crawford RN)  Care Plan - Patient's Chronic Conditions and Co-Morbidity Symptoms are Monitored and Maintained or Improved:   Monitor and assess patient's chronic conditions and comorbid symptoms for stability, deterioration, or improvement   Collaborate with multidisciplinary team to address chronic and comorbid conditions and prevent exacerbation or deterioration   Update acute care plan with appropriate goals if chronic or comorbid symptoms are exacerbated and prevent overall improvement and discharge     Problem: Pain  Goal: Verbalizes/displays adequate comfort level or baseline comfort level  2/23/2024 2012 by Mathew Quevedo RN  Outcome: Adequate for Discharge  2/23/2024 1640 by Flores Crawford RN  Outcome: Progressing     Problem: Skin/Tissue Integrity  Goal: Absence of new skin breakdown  Description: 1.  Monitor for areas of redness and/or skin

## 2024-03-29 ENCOUNTER — HOSPITAL ENCOUNTER (INPATIENT)
Facility: HOSPITAL | Age: 54
LOS: 4 days | Discharge: HOME HEALTH | DRG: 640 | End: 2024-04-02
Attending: EMERGENCY MEDICINE | Admitting: INTERNAL MEDICINE
Payer: MEDICARE

## 2024-03-29 ENCOUNTER — APPOINTMENT (OUTPATIENT)
Facility: HOSPITAL | Age: 54
DRG: 640 | End: 2024-03-29
Payer: MEDICARE

## 2024-03-29 DIAGNOSIS — R11.2 NAUSEA AND VOMITING, UNSPECIFIED VOMITING TYPE: Primary | ICD-10-CM

## 2024-03-29 PROBLEM — R10.9 ABDOMINAL PAIN: Status: ACTIVE | Noted: 2024-03-29

## 2024-03-29 LAB
ALBUMIN SERPL-MCNC: 3.7 G/DL (ref 3.4–5)
ALBUMIN/GLOB SERPL: 0.8 (ref 0.8–1.7)
ALP SERPL-CCNC: 77 U/L (ref 45–117)
ALT SERPL-CCNC: 7 U/L (ref 16–61)
ANION GAP SERPL CALC-SCNC: 19 MMOL/L (ref 3–18)
AST SERPL-CCNC: 8 U/L (ref 10–38)
BASOPHILS # BLD: 0 K/UL (ref 0–0.1)
BASOPHILS NFR BLD: 1 % (ref 0–2)
BILIRUB SERPL-MCNC: 0.5 MG/DL (ref 0.2–1)
BUN SERPL-MCNC: 155 MG/DL (ref 7–18)
BUN/CREAT SERPL: 7 (ref 12–20)
CA-I BLD-SCNC: 0.82 MMOL/L (ref 1.12–1.32)
CALCIUM SERPL-MCNC: 7.3 MG/DL (ref 8.5–10.1)
CHLORIDE SERPL-SCNC: 107 MMOL/L (ref 100–111)
CO2 SERPL-SCNC: 12 MMOL/L (ref 21–32)
CREAT SERPL-MCNC: 23.4 MG/DL (ref 0.6–1.3)
DIFFERENTIAL METHOD BLD: ABNORMAL
EOSINOPHIL # BLD: 0.1 K/UL (ref 0–0.4)
EOSINOPHIL NFR BLD: 1 % (ref 0–5)
ERYTHROCYTE [DISTWIDTH] IN BLOOD BY AUTOMATED COUNT: 19.9 % (ref 11.6–14.5)
FLUAV RNA SPEC QL NAA+PROBE: NOT DETECTED
FLUBV RNA SPEC QL NAA+PROBE: NOT DETECTED
GLOBULIN SER CALC-MCNC: 4.8 G/DL (ref 2–4)
GLUCOSE BLD STRIP.AUTO-MCNC: 102 MG/DL (ref 70–110)
GLUCOSE BLD STRIP.AUTO-MCNC: 57 MG/DL (ref 70–110)
GLUCOSE BLD STRIP.AUTO-MCNC: 60 MG/DL (ref 70–110)
GLUCOSE BLD STRIP.AUTO-MCNC: 84 MG/DL (ref 70–110)
GLUCOSE SERPL-MCNC: 52 MG/DL (ref 74–99)
HCT VFR BLD AUTO: 27.7 % (ref 36–48)
HGB BLD-MCNC: 9.4 G/DL (ref 13–16)
IMM GRANULOCYTES # BLD AUTO: 0 K/UL (ref 0–0.04)
IMM GRANULOCYTES NFR BLD AUTO: 1 % (ref 0–0.5)
LIPASE SERPL-CCNC: 39 U/L (ref 13–75)
LYMPHOCYTES # BLD: 0.4 K/UL (ref 0.9–3.6)
LYMPHOCYTES NFR BLD: 8 % (ref 21–52)
MCH RBC QN AUTO: 26 PG (ref 24–34)
MCHC RBC AUTO-ENTMCNC: 33.9 G/DL (ref 31–37)
MCV RBC AUTO: 76.5 FL (ref 78–100)
MONOCYTES # BLD: 0.5 K/UL (ref 0.05–1.2)
MONOCYTES NFR BLD: 10 % (ref 3–10)
NEUTS SEG # BLD: 4.2 K/UL (ref 1.8–8)
NEUTS SEG NFR BLD: 80 % (ref 40–73)
NRBC # BLD: 0 K/UL (ref 0–0.01)
NRBC BLD-RTO: 0 PER 100 WBC
PLATELET # BLD AUTO: 109 K/UL (ref 135–420)
POTASSIUM SERPL-SCNC: 6 MMOL/L (ref 3.5–5.5)
PROT SERPL-MCNC: 8.5 G/DL (ref 6.4–8.2)
RBC # BLD AUTO: 3.62 M/UL (ref 4.35–5.65)
SARS-COV-2 RNA RESP QL NAA+PROBE: NOT DETECTED
SODIUM SERPL-SCNC: 138 MMOL/L (ref 136–145)
TROPONIN I SERPL HS-MCNC: 74 NG/L (ref 0–78)
WBC # BLD AUTO: 5.2 K/UL (ref 4.6–13.2)

## 2024-03-29 PROCEDURE — 82962 GLUCOSE BLOOD TEST: CPT

## 2024-03-29 PROCEDURE — 2500000003 HC RX 250 WO HCPCS: Performed by: EMERGENCY MEDICINE

## 2024-03-29 PROCEDURE — 6360000004 HC RX CONTRAST MEDICATION: Performed by: EMERGENCY MEDICINE

## 2024-03-29 PROCEDURE — 71260 CT THORAX DX C+: CPT

## 2024-03-29 PROCEDURE — 96376 TX/PRO/DX INJ SAME DRUG ADON: CPT

## 2024-03-29 PROCEDURE — 71045 X-RAY EXAM CHEST 1 VIEW: CPT

## 2024-03-29 PROCEDURE — 84484 ASSAY OF TROPONIN QUANT: CPT

## 2024-03-29 PROCEDURE — 99285 EMERGENCY DEPT VISIT HI MDM: CPT

## 2024-03-29 PROCEDURE — 1100000003 HC PRIVATE W/ TELEMETRY

## 2024-03-29 PROCEDURE — 85025 COMPLETE CBC W/AUTO DIFF WBC: CPT

## 2024-03-29 PROCEDURE — 99223 1ST HOSP IP/OBS HIGH 75: CPT | Performed by: INTERNAL MEDICINE

## 2024-03-29 PROCEDURE — 5A1D70Z PERFORMANCE OF URINARY FILTRATION, INTERMITTENT, LESS THAN 6 HOURS PER DAY: ICD-10-PCS | Performed by: INTERNAL MEDICINE

## 2024-03-29 PROCEDURE — 96365 THER/PROPH/DIAG IV INF INIT: CPT

## 2024-03-29 PROCEDURE — 2580000003 HC RX 258: Performed by: INTERNAL MEDICINE

## 2024-03-29 PROCEDURE — 6370000000 HC RX 637 (ALT 250 FOR IP): Performed by: INTERNAL MEDICINE

## 2024-03-29 PROCEDURE — 87636 SARSCOV2 & INF A&B AMP PRB: CPT

## 2024-03-29 PROCEDURE — 6360000002 HC RX W HCPCS: Performed by: EMERGENCY MEDICINE

## 2024-03-29 PROCEDURE — 96366 THER/PROPH/DIAG IV INF ADDON: CPT

## 2024-03-29 PROCEDURE — 93005 ELECTROCARDIOGRAM TRACING: CPT | Performed by: EMERGENCY MEDICINE

## 2024-03-29 PROCEDURE — 82330 ASSAY OF CALCIUM: CPT

## 2024-03-29 PROCEDURE — 96375 TX/PRO/DX INJ NEW DRUG ADDON: CPT

## 2024-03-29 PROCEDURE — 70450 CT HEAD/BRAIN W/O DYE: CPT

## 2024-03-29 PROCEDURE — 80053 COMPREHEN METABOLIC PANEL: CPT

## 2024-03-29 PROCEDURE — G0257 UNSCHED DIALYSIS ESRD PT HOS: HCPCS

## 2024-03-29 PROCEDURE — 6360000002 HC RX W HCPCS: Performed by: INTERNAL MEDICINE

## 2024-03-29 PROCEDURE — 83690 ASSAY OF LIPASE: CPT

## 2024-03-29 PROCEDURE — 90935 HEMODIALYSIS ONE EVALUATION: CPT

## 2024-03-29 RX ORDER — SODIUM CHLORIDE 0.9 % (FLUSH) 0.9 %
5-40 SYRINGE (ML) INJECTION PRN
Status: DISCONTINUED | OUTPATIENT
Start: 2024-03-29 | End: 2024-04-02 | Stop reason: HOSPADM

## 2024-03-29 RX ORDER — HEPARIN SODIUM 5000 [USP'U]/ML
5000 INJECTION, SOLUTION INTRAVENOUS; SUBCUTANEOUS EVERY 8 HOURS SCHEDULED
Status: DISCONTINUED | OUTPATIENT
Start: 2024-03-29 | End: 2024-04-02 | Stop reason: HOSPADM

## 2024-03-29 RX ORDER — GLUCAGON 1 MG/ML
1 KIT INJECTION PRN
Status: DISCONTINUED | OUTPATIENT
Start: 2024-03-29 | End: 2024-04-02 | Stop reason: HOSPADM

## 2024-03-29 RX ORDER — SODIUM CHLORIDE 0.9 % (FLUSH) 0.9 %
5-40 SYRINGE (ML) INJECTION EVERY 12 HOURS SCHEDULED
Status: DISCONTINUED | OUTPATIENT
Start: 2024-03-29 | End: 2024-04-02 | Stop reason: HOSPADM

## 2024-03-29 RX ORDER — ACETAMINOPHEN 650 MG/1
650 SUPPOSITORY RECTAL EVERY 6 HOURS PRN
Status: DISCONTINUED | OUTPATIENT
Start: 2024-03-29 | End: 2024-04-02 | Stop reason: HOSPADM

## 2024-03-29 RX ORDER — DEXTROSE MONOHYDRATE 100 MG/ML
INJECTION, SOLUTION INTRAVENOUS CONTINUOUS PRN
Status: DISCONTINUED | OUTPATIENT
Start: 2024-03-29 | End: 2024-04-02 | Stop reason: HOSPADM

## 2024-03-29 RX ORDER — MORPHINE SULFATE 4 MG/ML
4 INJECTION, SOLUTION INTRAMUSCULAR; INTRAVENOUS ONCE
Status: COMPLETED | OUTPATIENT
Start: 2024-03-29 | End: 2024-03-29

## 2024-03-29 RX ORDER — HEPARIN SODIUM 1000 [USP'U]/ML
3200 INJECTION, SOLUTION INTRAVENOUS; SUBCUTANEOUS PRN
Status: DISCONTINUED | OUTPATIENT
Start: 2024-03-29 | End: 2024-04-02 | Stop reason: HOSPADM

## 2024-03-29 RX ORDER — ONDANSETRON 2 MG/ML
4 INJECTION INTRAMUSCULAR; INTRAVENOUS ONCE
Status: COMPLETED | OUTPATIENT
Start: 2024-03-29 | End: 2024-03-29

## 2024-03-29 RX ORDER — ACETAMINOPHEN 325 MG/1
650 TABLET ORAL EVERY 6 HOURS PRN
Status: DISCONTINUED | OUTPATIENT
Start: 2024-03-29 | End: 2024-04-02 | Stop reason: HOSPADM

## 2024-03-29 RX ORDER — MORPHINE SULFATE 2 MG/ML
2 INJECTION, SOLUTION INTRAMUSCULAR; INTRAVENOUS EVERY 4 HOURS PRN
Status: DISCONTINUED | OUTPATIENT
Start: 2024-03-29 | End: 2024-03-29 | Stop reason: DRUGHIGH

## 2024-03-29 RX ORDER — SODIUM CHLORIDE 9 MG/ML
INJECTION, SOLUTION INTRAVENOUS PRN
Status: DISCONTINUED | OUTPATIENT
Start: 2024-03-29 | End: 2024-04-02 | Stop reason: HOSPADM

## 2024-03-29 RX ORDER — POLYETHYLENE GLYCOL 3350 17 G/17G
17 POWDER, FOR SOLUTION ORAL DAILY PRN
Status: DISCONTINUED | OUTPATIENT
Start: 2024-03-29 | End: 2024-04-02 | Stop reason: HOSPADM

## 2024-03-29 RX ORDER — MORPHINE SULFATE 2 MG/ML
2 INJECTION, SOLUTION INTRAMUSCULAR; INTRAVENOUS EVERY 6 HOURS PRN
Status: DISCONTINUED | OUTPATIENT
Start: 2024-03-29 | End: 2024-04-02 | Stop reason: HOSPADM

## 2024-03-29 RX ORDER — DEXTROSE MONOHYDRATE 25 G/50ML
25 INJECTION, SOLUTION INTRAVENOUS ONCE
Status: COMPLETED | OUTPATIENT
Start: 2024-03-29 | End: 2024-03-29

## 2024-03-29 RX ORDER — ONDANSETRON 2 MG/ML
4 INJECTION INTRAMUSCULAR; INTRAVENOUS EVERY 6 HOURS PRN
Status: DISCONTINUED | OUTPATIENT
Start: 2024-03-29 | End: 2024-04-02 | Stop reason: HOSPADM

## 2024-03-29 RX ORDER — ONDANSETRON 4 MG/1
4 TABLET, ORALLY DISINTEGRATING ORAL EVERY 8 HOURS PRN
Status: DISCONTINUED | OUTPATIENT
Start: 2024-03-29 | End: 2024-04-02 | Stop reason: HOSPADM

## 2024-03-29 RX ORDER — CALCIUM GLUCONATE 20 MG/ML
1000 INJECTION, SOLUTION INTRAVENOUS ONCE
Status: COMPLETED | OUTPATIENT
Start: 2024-03-29 | End: 2024-03-29

## 2024-03-29 RX ADMIN — MORPHINE SULFATE 2 MG: 2 INJECTION, SOLUTION INTRAMUSCULAR; INTRAVENOUS at 20:32

## 2024-03-29 RX ADMIN — SODIUM CHLORIDE, PRESERVATIVE FREE 10 ML: 5 INJECTION INTRAVENOUS at 20:33

## 2024-03-29 RX ADMIN — MORPHINE SULFATE 4 MG: 4 INJECTION, SOLUTION INTRAMUSCULAR; INTRAVENOUS at 17:52

## 2024-03-29 RX ADMIN — IOPAMIDOL 80 ML: 612 INJECTION, SOLUTION INTRAVENOUS at 17:41

## 2024-03-29 RX ADMIN — DEXTROSE MONOHYDRATE 25 G: 25 INJECTION, SOLUTION INTRAVENOUS at 16:13

## 2024-03-29 RX ADMIN — Medication 16 G: at 20:45

## 2024-03-29 RX ADMIN — CALCIUM GLUCONATE 1000 MG: 20 INJECTION, SOLUTION INTRAVENOUS at 16:50

## 2024-03-29 RX ADMIN — Medication 16 G: at 21:37

## 2024-03-29 RX ADMIN — ONDANSETRON 4 MG: 2 INJECTION INTRAMUSCULAR; INTRAVENOUS at 15:58

## 2024-03-29 ASSESSMENT — PAIN DESCRIPTION - DESCRIPTORS
DESCRIPTORS: SORE
DESCRIPTORS: SHARP;SHOOTING;ACHING

## 2024-03-29 ASSESSMENT — PAIN DESCRIPTION - LOCATION
LOCATION: ABDOMEN
LOCATION: THROAT
LOCATION: ABDOMEN;THROAT

## 2024-03-29 ASSESSMENT — PAIN SCALES - GENERAL
PAINLEVEL_OUTOF10: 8
PAINLEVEL_OUTOF10: 8
PAINLEVEL_OUTOF10: 10
PAINLEVEL_OUTOF10: 10

## 2024-03-29 ASSESSMENT — PAIN - FUNCTIONAL ASSESSMENT
PAIN_FUNCTIONAL_ASSESSMENT: 0-10
PAIN_FUNCTIONAL_ASSESSMENT: 0-10

## 2024-03-29 ASSESSMENT — PAIN DESCRIPTION - PAIN TYPE
TYPE: ACUTE PAIN
TYPE: ACUTE PAIN

## 2024-03-29 NOTE — ED NOTES
Pt. Was driving today when he began to experience severe onset of abd. Pain, nausea, and vomiting. Visible redness around sclera and white discharge around eyes, and white discharge coming from eyes    Appears very uncomfortable during triage.    He is a dialysis patient and has missed dialysis this week for an unknown reason at the time of this notation.

## 2024-03-29 NOTE — ED NOTES
Handoff report given to SANIA Riley    Pt. Rounded on and introduced self for shift change.    Will update new set of vitals in flowsheets.

## 2024-03-29 NOTE — ED NOTES
Pt. Appeared to be altered with RN at bedside looking at the room and not redirectable to orientation.    Provider was at bedside to assess pt.     Will continue to monitor and carry out orders.
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
## 2024-03-30 ENCOUNTER — APPOINTMENT (OUTPATIENT)
Facility: HOSPITAL | Age: 54
DRG: 640 | End: 2024-03-30
Payer: MEDICARE

## 2024-03-30 LAB
ALBUMIN SERPL-MCNC: 3.1 G/DL (ref 3.4–5)
ALBUMIN/GLOB SERPL: 0.8 (ref 0.8–1.7)
ALP SERPL-CCNC: 65 U/L (ref 45–117)
ALT SERPL-CCNC: 7 U/L (ref 16–61)
ANION GAP SERPL CALC-SCNC: 14 MMOL/L (ref 3–18)
AST SERPL-CCNC: 7 U/L (ref 10–38)
BASOPHILS # BLD: 0 K/UL (ref 0–0.1)
BASOPHILS NFR BLD: 1 % (ref 0–2)
BILIRUB SERPL-MCNC: 0.8 MG/DL (ref 0.2–1)
BUN SERPL-MCNC: 85 MG/DL (ref 7–18)
BUN/CREAT SERPL: 6 (ref 12–20)
CALCIUM SERPL-MCNC: 8 MG/DL (ref 8.5–10.1)
CHLORIDE SERPL-SCNC: 101 MMOL/L (ref 100–111)
CO2 SERPL-SCNC: 22 MMOL/L (ref 21–32)
CREAT SERPL-MCNC: 14.3 MG/DL (ref 0.6–1.3)
DIFFERENTIAL METHOD BLD: ABNORMAL
EKG ATRIAL RATE: 92 BPM
EKG DIAGNOSIS: NORMAL
EKG P AXIS: 72 DEGREES
EKG P-R INTERVAL: 160 MS
EKG Q-T INTERVAL: 404 MS
EKG QRS DURATION: 82 MS
EKG QTC CALCULATION (BAZETT): 499 MS
EKG R AXIS: -7 DEGREES
EKG T AXIS: 93 DEGREES
EKG VENTRICULAR RATE: 92 BPM
EOSINOPHIL # BLD: 0.2 K/UL (ref 0–0.4)
EOSINOPHIL NFR BLD: 4 % (ref 0–5)
ERYTHROCYTE [DISTWIDTH] IN BLOOD BY AUTOMATED COUNT: 19.6 % (ref 11.6–14.5)
GLOBULIN SER CALC-MCNC: 3.9 G/DL (ref 2–4)
GLUCOSE BLD STRIP.AUTO-MCNC: 100 MG/DL (ref 70–110)
GLUCOSE BLD STRIP.AUTO-MCNC: 49 MG/DL (ref 70–110)
GLUCOSE BLD STRIP.AUTO-MCNC: 60 MG/DL (ref 70–110)
GLUCOSE BLD STRIP.AUTO-MCNC: 78 MG/DL (ref 70–110)
GLUCOSE BLD STRIP.AUTO-MCNC: 84 MG/DL (ref 70–110)
GLUCOSE BLD STRIP.AUTO-MCNC: 85 MG/DL (ref 70–110)
GLUCOSE BLD STRIP.AUTO-MCNC: 89 MG/DL (ref 70–110)
GLUCOSE BLD STRIP.AUTO-MCNC: 93 MG/DL (ref 70–110)
GLUCOSE BLD STRIP.AUTO-MCNC: 99 MG/DL (ref 70–110)
GLUCOSE SERPL-MCNC: 59 MG/DL (ref 74–99)
HCT VFR BLD AUTO: 25 % (ref 36–48)
HGB BLD-MCNC: 8.8 G/DL (ref 13–16)
IMM GRANULOCYTES # BLD AUTO: 0 K/UL (ref 0–0.04)
IMM GRANULOCYTES NFR BLD AUTO: 0 % (ref 0–0.5)
LYMPHOCYTES # BLD: 0.5 K/UL (ref 0.9–3.6)
LYMPHOCYTES NFR BLD: 11 % (ref 21–52)
MCH RBC QN AUTO: 26.3 PG (ref 24–34)
MCHC RBC AUTO-ENTMCNC: 35.2 G/DL (ref 31–37)
MCV RBC AUTO: 74.6 FL (ref 78–100)
MONOCYTES # BLD: 0.6 K/UL (ref 0.05–1.2)
MONOCYTES NFR BLD: 14 % (ref 3–10)
NEUTS SEG # BLD: 3.2 K/UL (ref 1.8–8)
NEUTS SEG NFR BLD: 70 % (ref 40–73)
NRBC # BLD: 0 K/UL (ref 0–0.01)
NRBC BLD-RTO: 0 PER 100 WBC
PLATELET # BLD AUTO: 107 K/UL (ref 135–420)
POTASSIUM SERPL-SCNC: 3.9 MMOL/L (ref 3.5–5.5)
PROT SERPL-MCNC: 7 G/DL (ref 6.4–8.2)
RBC # BLD AUTO: 3.35 M/UL (ref 4.35–5.65)
SODIUM SERPL-SCNC: 137 MMOL/L (ref 136–145)
WBC # BLD AUTO: 4.5 K/UL (ref 4.6–13.2)

## 2024-03-30 PROCEDURE — 90935 HEMODIALYSIS ONE EVALUATION: CPT

## 2024-03-30 PROCEDURE — 6370000000 HC RX 637 (ALT 250 FOR IP): Performed by: INTERNAL MEDICINE

## 2024-03-30 PROCEDURE — 36415 COLL VENOUS BLD VENIPUNCTURE: CPT

## 2024-03-30 PROCEDURE — 6360000002 HC RX W HCPCS: Performed by: INTERNAL MEDICINE

## 2024-03-30 PROCEDURE — 96376 TX/PRO/DX INJ SAME DRUG ADON: CPT

## 2024-03-30 PROCEDURE — 87040 BLOOD CULTURE FOR BACTERIA: CPT

## 2024-03-30 PROCEDURE — 80053 COMPREHEN METABOLIC PANEL: CPT

## 2024-03-30 PROCEDURE — 96367 TX/PROPH/DG ADDL SEQ IV INF: CPT

## 2024-03-30 PROCEDURE — 2580000003 HC RX 258: Performed by: HOSPITALIST

## 2024-03-30 PROCEDURE — 76705 ECHO EXAM OF ABDOMEN: CPT

## 2024-03-30 PROCEDURE — 6360000002 HC RX W HCPCS: Performed by: HOSPITALIST

## 2024-03-30 PROCEDURE — 96375 TX/PRO/DX INJ NEW DRUG ADDON: CPT

## 2024-03-30 PROCEDURE — 2500000003 HC RX 250 WO HCPCS: Performed by: HOSPITALIST

## 2024-03-30 PROCEDURE — 1100000003 HC PRIVATE W/ TELEMETRY

## 2024-03-30 PROCEDURE — 85025 COMPLETE CBC W/AUTO DIFF WBC: CPT

## 2024-03-30 PROCEDURE — 96372 THER/PROPH/DIAG INJ SC/IM: CPT

## 2024-03-30 PROCEDURE — G0257 UNSCHED DIALYSIS ESRD PT HOS: HCPCS

## 2024-03-30 PROCEDURE — 82962 GLUCOSE BLOOD TEST: CPT

## 2024-03-30 PROCEDURE — 2580000003 HC RX 258: Performed by: INTERNAL MEDICINE

## 2024-03-30 RX ORDER — LEVOFLOXACIN 5 MG/ML
500 INJECTION, SOLUTION INTRAVENOUS
Status: DISCONTINUED | OUTPATIENT
Start: 2024-03-30 | End: 2024-04-02 | Stop reason: HOSPADM

## 2024-03-30 RX ORDER — LANOLIN ALCOHOL/MO/W.PET/CERES
1000 CREAM (GRAM) TOPICAL DAILY
Status: DISCONTINUED | OUTPATIENT
Start: 2024-03-30 | End: 2024-04-02 | Stop reason: HOSPADM

## 2024-03-30 RX ORDER — LOSARTAN POTASSIUM 50 MG/1
100 TABLET ORAL DAILY
Status: DISCONTINUED | OUTPATIENT
Start: 2024-03-30 | End: 2024-04-02 | Stop reason: HOSPADM

## 2024-03-30 RX ORDER — CALCITRIOL 0.25 UG/1
0.5 CAPSULE, LIQUID FILLED ORAL DAILY
Status: DISCONTINUED | OUTPATIENT
Start: 2024-03-30 | End: 2024-04-02 | Stop reason: HOSPADM

## 2024-03-30 RX ORDER — LABETALOL 200 MG/1
200 TABLET, FILM COATED ORAL 2 TIMES DAILY
Status: DISCONTINUED | OUTPATIENT
Start: 2024-03-30 | End: 2024-04-02 | Stop reason: HOSPADM

## 2024-03-30 RX ORDER — SEVELAMER CARBONATE 800 MG/1
800 TABLET, FILM COATED ORAL
Status: DISCONTINUED | OUTPATIENT
Start: 2024-03-30 | End: 2024-04-02 | Stop reason: HOSPADM

## 2024-03-30 RX ORDER — HYDRALAZINE HYDROCHLORIDE 50 MG/1
100 TABLET, FILM COATED ORAL EVERY 8 HOURS SCHEDULED
Status: DISCONTINUED | OUTPATIENT
Start: 2024-03-30 | End: 2024-04-02 | Stop reason: HOSPADM

## 2024-03-30 RX ORDER — HYDRALAZINE HYDROCHLORIDE 20 MG/ML
20 INJECTION INTRAMUSCULAR; INTRAVENOUS ONCE
Status: COMPLETED | OUTPATIENT
Start: 2024-03-30 | End: 2024-03-30

## 2024-03-30 RX ORDER — AMLODIPINE BESYLATE 5 MG/1
10 TABLET ORAL DAILY
Status: DISCONTINUED | OUTPATIENT
Start: 2024-03-30 | End: 2024-04-02 | Stop reason: HOSPADM

## 2024-03-30 RX ORDER — DEXTROSE MONOHYDRATE 50 MG/ML
INJECTION, SOLUTION INTRAVENOUS CONTINUOUS
Status: DISCONTINUED | OUTPATIENT
Start: 2024-03-30 | End: 2024-04-02 | Stop reason: HOSPADM

## 2024-03-30 RX ADMIN — SEVELAMER CARBONATE 800 MG: 800 TABLET, FILM COATED ORAL at 11:21

## 2024-03-30 RX ADMIN — HYDRALAZINE HYDROCHLORIDE 20 MG: 20 INJECTION INTRAMUSCULAR; INTRAVENOUS at 01:19

## 2024-03-30 RX ADMIN — CYANOCOBALAMIN TAB 1000 MCG 1000 MCG: 1000 TAB at 08:45

## 2024-03-30 RX ADMIN — DEXTROSE MONOHYDRATE: 50 INJECTION, SOLUTION INTRAVENOUS at 14:29

## 2024-03-30 RX ADMIN — CALCITRIOL CAPSULES 0.25 MCG 0.5 MCG: 0.25 CAPSULE ORAL at 08:45

## 2024-03-30 RX ADMIN — HEPARIN SODIUM 5000 UNITS: 5000 INJECTION INTRAVENOUS; SUBCUTANEOUS at 00:01

## 2024-03-30 RX ADMIN — HEPARIN SODIUM 5000 UNITS: 5000 INJECTION INTRAVENOUS; SUBCUTANEOUS at 17:34

## 2024-03-30 RX ADMIN — AMLODIPINE BESYLATE 10 MG: 5 TABLET ORAL at 08:46

## 2024-03-30 RX ADMIN — LABETALOL HYDROCHLORIDE 200 MG: 200 TABLET, FILM COATED ORAL at 08:46

## 2024-03-30 RX ADMIN — HEPARIN SODIUM 5000 UNITS: 5000 INJECTION INTRAVENOUS; SUBCUTANEOUS at 08:46

## 2024-03-30 RX ADMIN — MORPHINE SULFATE 2 MG: 2 INJECTION, SOLUTION INTRAMUSCULAR; INTRAVENOUS at 00:00

## 2024-03-30 RX ADMIN — HEPARIN SODIUM 5000 UNITS: 5000 INJECTION INTRAVENOUS; SUBCUTANEOUS at 23:53

## 2024-03-30 RX ADMIN — HEPARIN SODIUM 3200 UNITS: 1000 INJECTION INTRAVENOUS; SUBCUTANEOUS at 16:21

## 2024-03-30 RX ADMIN — LEVOFLOXACIN 500 MG: 5 INJECTION, SOLUTION INTRAVENOUS at 18:40

## 2024-03-30 RX ADMIN — LABETALOL HYDROCHLORIDE 200 MG: 200 TABLET, FILM COATED ORAL at 01:19

## 2024-03-30 RX ADMIN — LOSARTAN POTASSIUM 100 MG: 50 TABLET, FILM COATED ORAL at 08:45

## 2024-03-30 RX ADMIN — HYDRALAZINE HYDROCHLORIDE 100 MG: 50 TABLET, FILM COATED ORAL at 05:36

## 2024-03-30 RX ADMIN — ACETAMINOPHEN 650 MG: 325 TABLET ORAL at 16:29

## 2024-03-30 RX ADMIN — DEXTROSE MONOHYDRATE 125 ML: 100 INJECTION, SOLUTION INTRAVENOUS at 02:11

## 2024-03-30 RX ADMIN — DEXTROSE MONOHYDRATE 125 ML: 100 INJECTION, SOLUTION INTRAVENOUS at 05:35

## 2024-03-30 RX ADMIN — SODIUM CHLORIDE, PRESERVATIVE FREE 10 ML: 5 INJECTION INTRAVENOUS at 08:50

## 2024-03-30 RX ADMIN — DOXYCYCLINE 100 MG: 100 INJECTION, POWDER, LYOPHILIZED, FOR SOLUTION INTRAVENOUS at 17:27

## 2024-03-30 RX ADMIN — ONDANSETRON 4 MG: 4 TABLET, ORALLY DISINTEGRATING ORAL at 23:53

## 2024-03-30 RX ADMIN — SEVELAMER CARBONATE 800 MG: 800 TABLET, FILM COATED ORAL at 17:34

## 2024-03-30 ASSESSMENT — PAIN DESCRIPTION - PAIN TYPE: TYPE: ACUTE PAIN

## 2024-03-30 ASSESSMENT — PAIN DESCRIPTION - DESCRIPTORS
DESCRIPTORS: ACHING
DESCRIPTORS: SHARP
DESCRIPTORS: ACHING

## 2024-03-30 ASSESSMENT — PAIN SCALES - GENERAL
PAINLEVEL_OUTOF10: 10
PAINLEVEL_OUTOF10: 0
PAINLEVEL_OUTOF10: 0
PAINLEVEL_OUTOF10: 6
PAINLEVEL_OUTOF10: 0
PAINLEVEL_OUTOF10: 6
PAINLEVEL_OUTOF10: 6
PAINLEVEL_OUTOF10: 0

## 2024-03-30 ASSESSMENT — PAIN DESCRIPTION - FREQUENCY: FREQUENCY: CONTINUOUS

## 2024-03-30 ASSESSMENT — PAIN - FUNCTIONAL ASSESSMENT: PAIN_FUNCTIONAL_ASSESSMENT: ACTIVITIES ARE NOT PREVENTED

## 2024-03-30 ASSESSMENT — PAIN DESCRIPTION - LOCATION
LOCATION: THROAT
LOCATION: ABDOMEN
LOCATION: THROAT

## 2024-03-30 ASSESSMENT — PAIN DESCRIPTION - ORIENTATION
ORIENTATION: RIGHT;LEFT
ORIENTATION: RIGHT;LEFT

## 2024-03-30 ASSESSMENT — PAIN DESCRIPTION - ONSET: ONSET: ON-GOING

## 2024-03-30 NOTE — H&P
pain   SKIN - no rashes, no bruising   ENDO - no temp intolerance, no polyuria, no polydypsia   NEURO - no focal weakness, no dizziness, no sensory changes, no speech changes   PSYCH -transient confusion, no hallucinations   HEENT - no headache    Physical Exam:      Visit Vitals  BP (!) 210/119   Pulse (!) 101   Temp 97.8 °F (36.6 °C) (Oral)   Resp 22   Ht 1.702 m (5' 7\")   Wt 81 kg (178 lb 9.2 oz)   SpO2 95%   BMI 27.97 kg/m²       Physical Exam:  Constitutional: The patient is oriented to person, place, and time.   No distress.   Eyes: Pupils are equal, round, and reactive to light. No scleral icterus.   Neck: No JVD present.   Cardiovascular: Regular rhythm. Exam reveals no gallop and no friction rub.   Pulmonary/Chest: Effort normal. No stridor. No respiratory distress.  has no wheezes. has no rales.   Abdominal: Soft. Bowel sounds are normal.  exhibits no distension. No tenderness.  has no rebound and no guarding.   Musculoskeletal:Normal strength.  exhibits no tenderness.   Neurological:  alert and oriented to person, place, and time.  Skin: Skin is warm and dry.   Psychiatric: Memory, affect and judgment normal  Labs Reviewed:    Recent Results (from the past 24 hour(s))   COVID-19 & Influenza Combo    Collection Time: 03/29/24  3:13 PM    Specimen: Nasopharyngeal   Result Value Ref Range    SARS-CoV-2, PCR Not detected NOTD      Rapid Influenza A By PCR Not detected NOTD      Rapid Influenza B By PCR Not detected NOTD     CBC with Auto Differential    Collection Time: 03/29/24  3:30 PM   Result Value Ref Range    WBC 5.2 4.6 - 13.2 K/uL    RBC 3.62 (L) 4.35 - 5.65 M/uL    Hemoglobin 9.4 (L) 13.0 - 16.0 g/dL    Hematocrit 27.7 (L) 36.0 - 48.0 %    MCV 76.5 (L) 78.0 - 100.0 FL    MCH 26.0 24.0 - 34.0 PG    MCHC 33.9 31.0 - 37.0 g/dL    RDW 19.9 (H) 11.6 - 14.5 %    Platelets 109 (L) 135 - 420 K/uL    Nucleated RBCs 0.0 0  WBC    nRBC 0.00 0.00 - 0.01 K/uL    Neutrophils % 80 (H) 40 - 73 %

## 2024-03-30 NOTE — PLAN OF CARE
Problem: Pain  Goal: Verbalizes/displays adequate comfort level or baseline comfort level  3/30/2024 1304 by Chip William, RN  Outcome: Progressing     Problem: Skin/Tissue Integrity  Goal: Absence of new skin breakdown  Description: 1.  Monitor for areas of redness and/or skin breakdown  2.  Assess vascular access sites hourly  3.  Every 4-6 hours minimum:  Change oxygen saturation probe site  4.  Every 4-6 hours:  If on nasal continuous positive airway pressure, respiratory therapy assess nares and determine need for appliance change or resting period.  3/30/2024 1304 by Chip William, RN  Outcome: Progressing     Problem: Safety - Adult  Goal: Free from fall injury  3/30/2024 1304 by Chip William, RN  Outcome: Progressing

## 2024-03-30 NOTE — PLAN OF CARE
SURGICAL     Signed         INTERVENTION:  HEMODYNAMIC STABILIZATION  MAINTAIN BP WNL WHILE ON HD.     INTERVENTION:  FLUID MANAGEMENT  WILL ATTEMPT 3000 ML TOTAL FLUID REMOVAL AS TOLERATED.     INTERVENTION:  METABOLIC/ELECTROLYTE MANAGEMENT  3.0 POTASSIUM 3.0 CALCIUM DIALYSATE USED WITH HD TODAY.     INTERVENTION:  HEMODIALYSIS ACCESS SITE MANAGEMENT  RIGHT SIDE CVC USING ASEPTIC TECHNIQUE.     GOAL:  SIGNS AND SYMPTOMS OF LISTED POTENTIAL PROBLEMS WILL BE ABSENT OR MANAGEABLE.     OUTCOME:  PROGRESSING.     HD PLANNED FOR 3 HOURS TODAY.      Problem: Chronic Conditions and Co-morbidities  Goal: Patient's chronic conditions and co-morbidity symptoms are monitored and maintained or improved  Outcome: Progressing  Today at 1323 - Progressing by Annamaria Branham RN

## 2024-03-30 NOTE — ED NOTES
FSBS 60, primary md paged.    Per hospitalist liv Crystal given orange juice and will continue glucose tablets. Primary RN at bedside, aware of plan and verbalized understanding.

## 2024-03-30 NOTE — ED NOTES
Patient resting but aroused; NAD noted; patient states pain; needs addressed; rechecked BGL; patient rec'v dialysis; will assume care of patient

## 2024-03-30 NOTE — CONSULTS
difficulty walking, falls, pain over muscle, joint pain, weakness  : anuric  Neurologia: dizziness, syncope, focal weakness, difficulty speaking  Integumentary: rash, redness, ulcer , edema  Psychiatric:  Depression,  suicidal ideation, anxiety    Objective:  BP (!) 117/55   Pulse 82   Temp 98.3 °F (36.8 °C) (Oral)   Resp 18   Ht 1.702 m (5' 7\")   Wt 84.9 kg (187 lb 2.7 oz)   SpO2 94%   BMI 29.32 kg/m²     Intake/Output Summary (Last 24 hours) at 3/30/2024 1229  Last data filed at 3/29/2024 2207  Gross per 24 hour   Intake 550 ml   Output 3500 ml   Net -2950 ml        Appear ill and older than states age, dry skin, abdomen is soft  TDC right upper chest       Laboratory Data:  Lab Results   Component Value Date    BUN 85 (H) 03/30/2024     (H) 03/29/2024    BUN 52 (H) 02/20/2024     03/30/2024     03/29/2024     02/20/2024    CO2 22 03/30/2024    CO2 12 (L) 03/29/2024    CO2 23 02/20/2024    GLUCOSE 59 (L) 03/30/2024    GLUCOSE 52 (LL) 03/29/2024    GLUCOSE 100 (H) 02/20/2024     Lab Results   Component Value Date    WBC 4.5 (L) 03/30/2024    WBC 5.2 03/29/2024    WBC 5.3 02/20/2024    HGB 8.8 (L) 03/30/2024    HGB 9.4 (L) 03/29/2024    HGB 10.8 (L) 02/20/2024    HCT 25.0 (L) 03/30/2024    HCT 27.7 (L) 03/29/2024    HCT 31.1 (L) 02/20/2024     Lab Results   Component Value Date    CALCIUM 8.0 (L) 03/30/2024    CALCIUM 7.3 (L) 03/29/2024    CALCIUM 7.8 (L) 02/20/2024       Time spent reviewing data 22 minutes, time Spent Face to Face 21 minutes, Time spent documenting 15 minutes, time spent for care coordination 6, total time spent 64 minutes.     )Attila Sales DO,

## 2024-03-30 NOTE — CARE COORDINATION
03/30/24 1043   Service Assessment   Patient Orientation Alert and Oriented   Cognition Alert   History Provided By Patient   Primary Caregiver Self   Accompanied By/Relationship None   Support Systems Family Members   Patient's Healthcare Decision Maker is: Legal Next of Kin   PCP Verified by CM Yes   Last Visit to PCP Within last 3 months   Prior Functional Level Independent in ADLs/IADLs   Current Functional Level Independent in ADLs/IADLs   Can patient return to prior living arrangement Yes   Ability to make needs known: Good   Family able to assist with home care needs: Yes   Would you like for me to discuss the discharge plan with any other family members/significant others, and if so, who? Yes  (Siblings)   Financial Resources Medicaid;Medicare   Community Resources None   Social/Functional History   Lives With Alone   Type of Home Apartment   Bathroom Toilet Standard   Bathroom Accessibility Accessible   Home Equipment Rollator  (Prosthetic leg)   Receives Help From Family   ADL Assistance Independent   Homemaking Assistance Independent   Ambulation Assistance Independent   Transfer Assistance Independent   Active  Yes   Mode of Transportation Car   Occupation On disability   Discharge Planning   Type of Residence Apartment   Living Arrangements Alone   Current Services Prior To Admission None   Potential Assistance Needed Home Care   DME Ordered? No   Potential Assistance Purchasing Medications No   Type of Home Care Services None   Patient expects to be discharged to: Apartment   History of falls? 0   Services At/After Discharge   Transition of Care Consult (CM Consult) Discharge Planning   Services At/After Discharge None   [place] Resource Information Provided? No   Mode of Transport at Discharge Other (see comment)  (Patient will need lyft home)   Confirm Follow Up Transport Family   Condition of Participation: Discharge Planning   The Plan for Transition of Care is related to the following

## 2024-03-31 LAB
GLUCOSE BLD STRIP.AUTO-MCNC: 103 MG/DL (ref 70–110)
GLUCOSE BLD STRIP.AUTO-MCNC: 111 MG/DL (ref 70–110)
GLUCOSE BLD STRIP.AUTO-MCNC: 90 MG/DL (ref 70–110)
GLUCOSE BLD STRIP.AUTO-MCNC: 91 MG/DL (ref 70–110)

## 2024-03-31 PROCEDURE — 6370000000 HC RX 637 (ALT 250 FOR IP): Performed by: HOSPITALIST

## 2024-03-31 PROCEDURE — 2500000003 HC RX 250 WO HCPCS: Performed by: HOSPITALIST

## 2024-03-31 PROCEDURE — 6370000000 HC RX 637 (ALT 250 FOR IP): Performed by: INTERNAL MEDICINE

## 2024-03-31 PROCEDURE — 6360000002 HC RX W HCPCS: Performed by: INTERNAL MEDICINE

## 2024-03-31 PROCEDURE — 96372 THER/PROPH/DIAG INJ SC/IM: CPT

## 2024-03-31 PROCEDURE — 2580000003 HC RX 258: Performed by: INTERNAL MEDICINE

## 2024-03-31 PROCEDURE — 2580000003 HC RX 258: Performed by: HOSPITALIST

## 2024-03-31 PROCEDURE — 96366 THER/PROPH/DIAG IV INF ADDON: CPT

## 2024-03-31 PROCEDURE — 82962 GLUCOSE BLOOD TEST: CPT

## 2024-03-31 PROCEDURE — 1100000003 HC PRIVATE W/ TELEMETRY

## 2024-03-31 PROCEDURE — 2700000000 HC OXYGEN THERAPY PER DAY

## 2024-03-31 RX ORDER — POLYETHYLENE GLYCOL 3350 17 G/17G
17 POWDER, FOR SOLUTION ORAL DAILY
Status: DISCONTINUED | OUTPATIENT
Start: 2024-03-31 | End: 2024-04-02 | Stop reason: HOSPADM

## 2024-03-31 RX ADMIN — HEPARIN SODIUM 5000 UNITS: 5000 INJECTION INTRAVENOUS; SUBCUTANEOUS at 08:23

## 2024-03-31 RX ADMIN — HEPARIN SODIUM 5000 UNITS: 5000 INJECTION INTRAVENOUS; SUBCUTANEOUS at 14:45

## 2024-03-31 RX ADMIN — POLYETHYLENE GLYCOL 3350 17 G: 17 POWDER, FOR SOLUTION ORAL at 10:58

## 2024-03-31 RX ADMIN — CALCITRIOL CAPSULES 0.25 MCG 0.5 MCG: 0.25 CAPSULE ORAL at 08:23

## 2024-03-31 RX ADMIN — DOXYCYCLINE 100 MG: 100 INJECTION, POWDER, LYOPHILIZED, FOR SOLUTION INTRAVENOUS at 02:28

## 2024-03-31 RX ADMIN — HYDRALAZINE HYDROCHLORIDE 100 MG: 50 TABLET, FILM COATED ORAL at 21:22

## 2024-03-31 RX ADMIN — SEVELAMER CARBONATE 800 MG: 800 TABLET, FILM COATED ORAL at 16:18

## 2024-03-31 RX ADMIN — LABETALOL HYDROCHLORIDE 200 MG: 200 TABLET, FILM COATED ORAL at 21:22

## 2024-03-31 RX ADMIN — AMLODIPINE BESYLATE 10 MG: 5 TABLET ORAL at 08:23

## 2024-03-31 RX ADMIN — SEVELAMER CARBONATE 800 MG: 800 TABLET, FILM COATED ORAL at 10:58

## 2024-03-31 RX ADMIN — CYANOCOBALAMIN TAB 1000 MCG 1000 MCG: 1000 TAB at 08:23

## 2024-03-31 RX ADMIN — SEVELAMER CARBONATE 800 MG: 800 TABLET, FILM COATED ORAL at 08:23

## 2024-03-31 RX ADMIN — HYDRALAZINE HYDROCHLORIDE 100 MG: 50 TABLET, FILM COATED ORAL at 06:33

## 2024-03-31 RX ADMIN — SODIUM CHLORIDE, PRESERVATIVE FREE 10 ML: 5 INJECTION INTRAVENOUS at 08:24

## 2024-03-31 RX ADMIN — DOXYCYCLINE 100 MG: 100 INJECTION, POWDER, LYOPHILIZED, FOR SOLUTION INTRAVENOUS at 14:45

## 2024-03-31 ASSESSMENT — PAIN SCALES - GENERAL
PAINLEVEL_OUTOF10: 0

## 2024-03-31 ASSESSMENT — PAIN SCALES - WONG BAKER: WONGBAKER_NUMERICALRESPONSE: NO HURT

## 2024-04-01 LAB
GLUCOSE BLD STRIP.AUTO-MCNC: 79 MG/DL (ref 70–110)
GLUCOSE BLD STRIP.AUTO-MCNC: 88 MG/DL (ref 70–110)
GLUCOSE BLD STRIP.AUTO-MCNC: 94 MG/DL (ref 70–110)
GLUCOSE BLD STRIP.AUTO-MCNC: 95 MG/DL (ref 70–110)

## 2024-04-01 PROCEDURE — G0257 UNSCHED DIALYSIS ESRD PT HOS: HCPCS

## 2024-04-01 PROCEDURE — 36556 INSERT NON-TUNNEL CV CATH: CPT

## 2024-04-01 PROCEDURE — 82962 GLUCOSE BLOOD TEST: CPT

## 2024-04-01 PROCEDURE — 96372 THER/PROPH/DIAG INJ SC/IM: CPT

## 2024-04-01 PROCEDURE — 2500000003 HC RX 250 WO HCPCS: Performed by: HOSPITALIST

## 2024-04-01 PROCEDURE — 2700000000 HC OXYGEN THERAPY PER DAY

## 2024-04-01 PROCEDURE — 6360000002 HC RX W HCPCS: Performed by: HOSPITALIST

## 2024-04-01 PROCEDURE — 6360000002 HC RX W HCPCS: Performed by: INTERNAL MEDICINE

## 2024-04-01 PROCEDURE — 97166 OT EVAL MOD COMPLEX 45 MIN: CPT

## 2024-04-01 PROCEDURE — 96366 THER/PROPH/DIAG IV INF ADDON: CPT

## 2024-04-01 PROCEDURE — 90935 HEMODIALYSIS ONE EVALUATION: CPT

## 2024-04-01 PROCEDURE — 2580000003 HC RX 258: Performed by: HOSPITALIST

## 2024-04-01 PROCEDURE — 1100000003 HC PRIVATE W/ TELEMETRY

## 2024-04-01 PROCEDURE — 97162 PT EVAL MOD COMPLEX 30 MIN: CPT

## 2024-04-01 PROCEDURE — 6370000000 HC RX 637 (ALT 250 FOR IP): Performed by: INTERNAL MEDICINE

## 2024-04-01 PROCEDURE — 2580000003 HC RX 258: Performed by: INTERNAL MEDICINE

## 2024-04-01 RX ADMIN — CYANOCOBALAMIN TAB 1000 MCG 1000 MCG: 1000 TAB at 09:04

## 2024-04-01 RX ADMIN — LABETALOL HYDROCHLORIDE 200 MG: 200 TABLET, FILM COATED ORAL at 09:04

## 2024-04-01 RX ADMIN — LEVOFLOXACIN 500 MG: 5 INJECTION, SOLUTION INTRAVENOUS at 17:40

## 2024-04-01 RX ADMIN — DOXYCYCLINE 100 MG: 100 INJECTION, POWDER, LYOPHILIZED, FOR SOLUTION INTRAVENOUS at 03:36

## 2024-04-01 RX ADMIN — SEVELAMER CARBONATE 800 MG: 800 TABLET, FILM COATED ORAL at 17:44

## 2024-04-01 RX ADMIN — LABETALOL HYDROCHLORIDE 200 MG: 200 TABLET, FILM COATED ORAL at 21:54

## 2024-04-01 RX ADMIN — HYDRALAZINE HYDROCHLORIDE 100 MG: 50 TABLET, FILM COATED ORAL at 06:43

## 2024-04-01 RX ADMIN — HEPARIN SODIUM 5000 UNITS: 5000 INJECTION INTRAVENOUS; SUBCUTANEOUS at 16:30

## 2024-04-01 RX ADMIN — AMLODIPINE BESYLATE 10 MG: 5 TABLET ORAL at 09:03

## 2024-04-01 RX ADMIN — HEPARIN SODIUM 5000 UNITS: 5000 INJECTION INTRAVENOUS; SUBCUTANEOUS at 23:07

## 2024-04-01 RX ADMIN — SODIUM CHLORIDE, PRESERVATIVE FREE 10 ML: 5 INJECTION INTRAVENOUS at 21:55

## 2024-04-01 RX ADMIN — SODIUM CHLORIDE, PRESERVATIVE FREE 10 ML: 5 INJECTION INTRAVENOUS at 09:09

## 2024-04-01 RX ADMIN — LOSARTAN POTASSIUM 100 MG: 50 TABLET, FILM COATED ORAL at 09:04

## 2024-04-01 RX ADMIN — CALCITRIOL CAPSULES 0.25 MCG 0.5 MCG: 0.25 CAPSULE ORAL at 09:04

## 2024-04-01 RX ADMIN — HYDRALAZINE HYDROCHLORIDE 100 MG: 50 TABLET, FILM COATED ORAL at 21:54

## 2024-04-01 RX ADMIN — HEPARIN SODIUM 5000 UNITS: 5000 INJECTION INTRAVENOUS; SUBCUTANEOUS at 09:04

## 2024-04-01 ASSESSMENT — PAIN SCALES - WONG BAKER
WONGBAKER_NUMERICALRESPONSE: NO HURT
WONGBAKER_NUMERICALRESPONSE: NO HURT

## 2024-04-01 ASSESSMENT — PAIN SCALES - GENERAL
PAINLEVEL_OUTOF10: 0

## 2024-04-01 NOTE — DIALYSIS
Treatment summary  Received report from everett CARDONA Rn    Received patient in the room a/o x4 VSS, R chest TDC   accessed without complication, treatment initiated.    Dialyzed patient in  room 349  for 4 hours.     Total Uf 3500  ml  Net UF 3000 ml     Treatment note:           Patient tolerate treatment well remain in stable condition.    Offered assistance with repositioning every 2 hours.      Vascular access visible at all times and line connected at all   times during treatment. Access R chest TDC Functioning well  De accessed without complications. Locked with heparin and   And capped.     Report given to Everett CARDONA RN with all questions answered.

## 2024-04-02 ENCOUNTER — HOME HEALTH ADMISSION (OUTPATIENT)
Age: 54
End: 2024-04-02

## 2024-04-02 VITALS
BODY MASS INDEX: 31.35 KG/M2 | HEIGHT: 67 IN | OXYGEN SATURATION: 100 % | RESPIRATION RATE: 18 BRPM | HEART RATE: 74 BPM | SYSTOLIC BLOOD PRESSURE: 122 MMHG | TEMPERATURE: 98.2 F | DIASTOLIC BLOOD PRESSURE: 43 MMHG | WEIGHT: 199.74 LBS

## 2024-04-02 LAB
GLUCOSE BLD STRIP.AUTO-MCNC: 73 MG/DL (ref 70–110)
GLUCOSE BLD STRIP.AUTO-MCNC: 93 MG/DL (ref 70–110)

## 2024-04-02 PROCEDURE — 2580000003 HC RX 258: Performed by: INTERNAL MEDICINE

## 2024-04-02 PROCEDURE — 82962 GLUCOSE BLOOD TEST: CPT

## 2024-04-02 PROCEDURE — 6360000002 HC RX W HCPCS: Performed by: INTERNAL MEDICINE

## 2024-04-02 PROCEDURE — 96372 THER/PROPH/DIAG INJ SC/IM: CPT

## 2024-04-02 PROCEDURE — 6370000000 HC RX 637 (ALT 250 FOR IP): Performed by: INTERNAL MEDICINE

## 2024-04-02 RX ORDER — LEVOFLOXACIN 500 MG/1
500 TABLET, FILM COATED ORAL
Qty: 2 TABLET | Refills: 0 | Status: SHIPPED | OUTPATIENT
Start: 2024-04-02

## 2024-04-02 RX ORDER — IBUPROFEN 600 MG/1
TABLET ORAL
Qty: 1 KIT | Refills: 0 | Status: SHIPPED | OUTPATIENT
Start: 2024-04-02

## 2024-04-02 RX ADMIN — LABETALOL HYDROCHLORIDE 200 MG: 200 TABLET, FILM COATED ORAL at 08:05

## 2024-04-02 RX ADMIN — CALCITRIOL CAPSULES 0.25 MCG 0.5 MCG: 0.25 CAPSULE ORAL at 08:05

## 2024-04-02 RX ADMIN — SEVELAMER CARBONATE 800 MG: 800 TABLET, FILM COATED ORAL at 11:53

## 2024-04-02 RX ADMIN — HYDRALAZINE HYDROCHLORIDE 100 MG: 50 TABLET, FILM COATED ORAL at 13:44

## 2024-04-02 RX ADMIN — HYDRALAZINE HYDROCHLORIDE 100 MG: 50 TABLET, FILM COATED ORAL at 06:22

## 2024-04-02 RX ADMIN — AMLODIPINE BESYLATE 10 MG: 5 TABLET ORAL at 08:05

## 2024-04-02 RX ADMIN — CYANOCOBALAMIN TAB 1000 MCG 1000 MCG: 1000 TAB at 08:05

## 2024-04-02 RX ADMIN — HEPARIN SODIUM 5000 UNITS: 5000 INJECTION INTRAVENOUS; SUBCUTANEOUS at 08:05

## 2024-04-02 RX ADMIN — LOSARTAN POTASSIUM 100 MG: 50 TABLET, FILM COATED ORAL at 08:05

## 2024-04-02 RX ADMIN — SODIUM CHLORIDE, PRESERVATIVE FREE 10 ML: 5 INJECTION INTRAVENOUS at 08:12

## 2024-04-02 ASSESSMENT — PAIN SCALES - GENERAL
PAINLEVEL_OUTOF10: 0

## 2024-04-02 NOTE — CARE COORDINATION
This CM spoke with the patient at the bedside to discuss DC planning. PT recommends the patient DC to home with HH, [facility] was offered and the patient [facility]. This CM also gave the patient a copy of his [facility] and a list of private duty nursing companies he can follow up with if he is needing more home care services when he returns home. The patient states when he dischrges he will be driving himself home.

## 2024-04-02 NOTE — PROGRESS NOTES
Hospitalist Progress Note-critical care note     Patient: Evan Huizar MRN: [id number]  CSN: [id number]    YOB: 1970  Age: 53 y.o.  Sex: male    DOA: 3/29/2024 LOS:  LOS: 3 days            Chief complaint: abdominal pain, encephalopathy chronic anemia esrd     Assessment/Plan         Active Hospital Problems    Diagnosis Date Noted    Abdominal pain [R10.9] 03/29/2024    Encephalopathy acute [G93.40] 02/19/2024    Hypoglycemia [E16.2] 02/19/2024    Chronic anemia [D64.9] 08/27/2022    Hypertension [I10] 06/01/2022    ESRD (end stage renal disease) on dialysis (HCC) [N18.6, Z99.2] 01/29/2020    Hx of BKA, right (HCC) [Z89.511] 01/29/2020      53 years old presented with lower abdominal pain, nausea and vomiting     Abdomen pain -resolved   Bcx  negative so far   Ct abdomen possible acute nano not support per ultrasound:Distended gallbladder, without stones nor specific evidence of acute cholecystitis.    Acute encephalopathy resolved   Now alert and oriented   Like due to uremia and hypoglycemia   Continue abx   Ct thickened urinary bladder.     Hypoglycemia resolved   Needs snack high carb     Hyperkalemia resolved per hd continue monitoring   Low K diet     Esrd on hd hd per nephrologist hd today     Htn continue home meds     BKA rt fall precaution     Chronic anemia   Due to chronic renal disease   Continue monitoring     Subjective I  feel fine I cooked at home       TIME: E/M Time spent with patient and patient care issues: [] 31-40 mins  [x] 41-49 mins  [] 50 mins or more.    dc doxy -cx negative   Will have pt/ot   Disposition : tomorrow   Review of systems:    General: No fevers or chills.  Cardiovascular: No chest pain or pressure. No palpitations.   Pulmonary: No shortness of breath.   Gastrointestinal: No nausea, vomiting. + mild abdomen pain     Vital signs/Intake and Output:  Visit Vitals  BP (!) 134/54   Pulse 80   Temp 98.3 °F (36.8 °C) (Oral)   Resp 18   Ht 1.702 m (5' 7\")   Wt 84.9 
  Physician Progress Note      PATIENT:               SIERRA WINTERS  CSN #:                  [id number]  :                       1970  ADMIT DATE:       3/29/2024 2:57 PM  DISCH DATE:  RESPONDING  PROVIDER #:        Hellen Shook MD          QUERY TEXT:    Pt admitted with abdominal pain and has encephalopathy documented. If   possible, please document in progress notes and discharge summary further   specificity regarding the type of encephalopathy:    The medical record reflects the following:  Risk Factors: ESRD, Dependence on renal dialysis, noncompliance with dialysis    Clinical Indicators:  > Per H&P- Transient confusion, could be due to hypoglycemia of 54, versus   uremic encephalopathy  > Per PN 3/30- Acute encephalopathy  Now alert and oriented  Will have blood cx -high risk for infection  Like due to uremia and hypoglycemia  > IM consult 3/30- Hypoglycemia/Encephalopathy  >  PN- Acute encephalopathy resolved  Now alert and oriented  Likely due to uremia and hypoglycemia    Treatment: receiving IV Levaquin, IV doxycycline, CT    Lulú Oropeza, RN, BSN, CRCR  [address] [place] [address]  [email or web address]  Options provided:  -- Metabolic encephalopathy  -- Encephalopathy due to (please specify), please specify.  -- Other - I will add my own diagnosis  -- Disagree - Not applicable / Not valid  -- Disagree - Clinically unable to determine / Unknown  -- Refer to Clinical Documentation Reviewer    PROVIDER RESPONSE TEXT:    This patient has metabolic encephalopathy.    Query created by: Lulú Oropeza on 2024 12:01 PM      Electronically signed by:  Hellen Shook MD 2024 12:08 PM          
-Discharge documents reviewed with the patient.  -Prescriptions signed by MD/in the patient's discharge folder.  -Belongings with patient.  -Both midlines removed.  -Questions encouraged and answered.  
1216: This RN was notified that the patient is eating lunch while on dialysis. The diaylsis RN is in the room with the patient and is fine with the patient eating lunch.  
2124:  Notified Dr. Ugarte of patient /47 with MAP 63 and scheduled Labetalol and Hydralazine.  Medications will be held per MD.    
2153 - BG 79 - pt given pudding  
Assessment:     Evan Huizar is a 53 y.o. year old male ESRD on HD,  DM-2, anemia, uncontrolled hypertension , hyperphosphatemia non compliance to dialysis prescription, dietary/life style modification He had missed dialysis this week     Pulmonary edema  Hypoglycemia/Encephalopathy  Volume overload  Uncontrolled Hypertension  Anemia  ESRD  Hyperphosphatemia      Plan:    HD MWF         Uncontrolled Hypertension: BP is always high as outpatient due to non adherence , BP in house is more stable   Continue amlodipine,losartan , labetalol , and  Hydralazine.     Epogen for anemia of CKD during HD as indicated    Transfusion to keep hemoglobin above 7 gram/dl      BMD:- calcitriol for hypocalcemia   Sevelamer for hyperphosphatemia     CC: ESRD  Interval History: no interval change since yesterday, feels fatigued      Subjective:   PT does not have any somatic complaints        Review of Systems  Negative for  SOB,  Nausea, vomiting        Blood pressure (!) 107/57, pulse 82, temperature 98.5 °F (36.9 °C), temperature source Oral, resp. rate 18, height 1.702 m (5' 7\"), weight 84.9 kg (187 lb 2.7 oz), SpO2 95 %.    Intake/Output Summary (Last 24 hours) at 3/31/2024 1510  Last data filed at 3/31/2024 1233  Gross per 24 hour   Intake 1180 ml   Output 2138 ml   Net -958 ml        Appear ill and older than states age, dry skin, abdomen is soft  TDC right upper chest         Intake/Output Summary (Last 24 hours) at 3/31/2024 1510  Last data filed at 3/31/2024 1233  Gross per 24 hour   Intake 1180 ml   Output 2138 ml   Net -958 ml      Recent Labs     03/30/24  0436   WBC 4.5*     Lab Results   Component Value Date/Time     03/30/2024 04:36 AM    K 3.9 03/30/2024 04:36 AM     03/30/2024 04:36 AM    CO2 22 03/30/2024 04:36 AM    BUN 85 03/30/2024 04:36 AM    GFRAA 12 08/30/2022 01:13 AM      Microbiology  [unfilled]   Current Facility-Administered Medications   Medication Dose Route Frequency Provider Last Rate 
Assessment:     Evan Huizar is a 53 y.o. year old male ESRD on HD,  DM-2, anemia, uncontrolled hypertension , hyperphosphatemia non compliance to dialysis prescription, dietary/life style modification He had missed dialysis this week presented with shortness of breath   CXR showed interstitial edema      Pulmonary edema  Hypoglycemia/Encephalopathy  Volume overload  Uncontrolled Hypertension  Anemia  ESRD  Hyperphosphatemia      Plan:    Patient examined and available labs reviewed. BMP not available this morning . No volume overload , no clinical indication of Dialysis today.  Next HD Wednesday         Uncontrolled Hypertension: BP is always high as outpatient due to non adherence , here is at target range today   Continue amlodipine,losartan , labetalol , and  Hydralazine.     Epogen for anemia of CKD during HD as indicated    Transfusion to keep hemoglobin above 7 gram/dl      BMD:- calcitriol for hypocalcemia   Sevelamer for hyperphosphatemia     Subjective:   BP stable   No shortness of breath or chest pain   No other symptoms and concern     Review of Systems  Negative for  SOB,  Nausea, vomiting        Blood pressure (!) 122/43, pulse 74, temperature 98.2 °F (36.8 °C), temperature source Oral, resp. rate 18, height 1.702 m (5' 7\"), weight 90.6 kg (199 lb 11.8 oz), SpO2 100 %.    Intake/Output Summary (Last 24 hours) at 4/2/2024 1301  Last data filed at 4/2/2024 0624  Gross per 24 hour   Intake 3956 ml   Output 3500 ml   Net 456 ml          Appear ill   TDC right upper chest   Chest - clear  No edema       Intake/Output Summary (Last 24 hours) at 4/2/2024 1301  Last data filed at 4/2/2024 0624  Gross per 24 hour   Intake 3956 ml   Output 3500 ml   Net 456 ml        No results for input(s): \"WBC\", \"HEMOGLOBIN\", \"PLATELET\" in the last 72 hours.    Lab Results   Component Value Date/Time     03/30/2024 04:36 AM    K 3.9 03/30/2024 04:36 AM     03/30/2024 04:36 AM    CO2 22 03/30/2024 04:36 AM    
Assessment:     Evan Huizar is a 53 y.o. year old male ESRD on HD,  DM-2, anemia, uncontrolled hypertension , hyperphosphatemia non compliance to dialysis prescription, dietary/life style modification He had missed dialysis this week presented with shortness of breath   CXR showed interstitial edema      Pulmonary edema  Hypoglycemia/Encephalopathy  Volume overload  Uncontrolled Hypertension  Anemia  ESRD  Hyperphosphatemia      Plan:    Patient examined and available labs reviewed. Dialysis today for clearance and volume removal        Uncontrolled Hypertension: BP is always high as outpatient due to non adherence ,  Continue amlodipine,losartan , labetalol , and  Hydralazine.     Epogen for anemia of CKD during HD as indicated    Transfusion to keep hemoglobin above 7 gram/dl      BMD:- calcitriol for hypocalcemia   Sevelamer for hyperphosphatemia     Subjective:   Says feeling better  BP elevated  No shortness of breath or chest pain   No other symptoms     Review of Systems  Negative for  SOB,  Nausea, vomiting        Blood pressure (!) 144/69, pulse 78, temperature 98.3 °F (36.8 °C), temperature source Oral, resp. rate 18, height 1.702 m (5' 7\"), weight 84.9 kg (187 lb 2.7 oz), SpO2 98 %.    Intake/Output Summary (Last 24 hours) at 4/1/2024 1200  Last data filed at 4/1/2024 0949  Gross per 24 hour   Intake 1951.25 ml   Output --   Net 1951.25 ml          Appear ill   TDC right upper chest   Chest - clear  No edema       Intake/Output Summary (Last 24 hours) at 4/1/2024 1200  Last data filed at 4/1/2024 0949  Gross per 24 hour   Intake 1951.25 ml   Output --   Net 1951.25 ml        Recent Labs     03/30/24  0436   WBC 4.5*       Lab Results   Component Value Date/Time     03/30/2024 04:36 AM    K 3.9 03/30/2024 04:36 AM     03/30/2024 04:36 AM    CO2 22 03/30/2024 04:36 AM    BUN 85 03/30/2024 04:36 AM    GFRAA 12 08/30/2022 01:13 AM      Microbiology  [unfilled]   [facility] 
Bedside and Verbal shift change report given to PATRICK William RN (oncoming nurse) by MASSIMO Romano RN (offgoing nurse). Report included the following information Nurse Handoff Report, Intake/Output, MAR, and Recent Results.     
Bedside and Verbal shift change report given to RADHA Floyd RN (oncoming nurse) by MASSIMO Romnao RN(offgoing nurse). Report included the following information Intake/Output, MAR, and Recent Results.     
Fluid removed-3000 ml  BVP-  Medications given-none ordered   Hepatitis Status-7/14/23 Negative, immune    Upon arrival to room pt asking me for pain medicine, call bell pushed and he spoke with staff. On O2 1.5L NC.     Access-  Right IJ TDC, flushed with no issues, brisk blood return. No dressing to site. Applied CHG dressing.No s/s of infection at site.     Treatment-  Treatment tolerated well, blood rinsed back.Heparin left to indwell in lumens, 1.6 ml each. Left pt in stable condition and without complaints.     Pre report given to SANIA Hughes and Post Report given to SANIA Taylor     
Occupational Therapy Goals:  Initiated 4/1/2024 to be met within 7-10 days.  Short Term Goals  Time Frame for Short Term Goals: 7 days  Short Term Goal 1: Patient will complete toilet transfer with mod I  Short Term Goal 2: Patient will complete bathing with mod I  Short Term Goal 3: Patient will complete grooming in standing at sink with mod I  Short Term Goal 4: Patient will complete dressing with mod I  Short Term Goal 5: Patient will complete toileting with mod I    OCCUPATIONAL THERAPY EVALUATION    Patient: Evan Huizar (53 y.o. male)  Date: 4/1/2024  Primary Diagnosis: Abdominal pain [R10.9]  Nausea and vomiting, unspecified vomiting type [R11.2]       Precautions: Fall Risk,  ,  ,  ,  ,  ,  ,    PLOF: Patient completed ADLs and IADls at modified independent level with prosthetic leg and RW. Patient typically does not make much urine. Patient drives self to dialysis and to grocery store- uses store scooters. Patient had a rollator but reports it is broken.    ASSESSMENT :  Patient presented supine in bed with cardiac monitor. Patient seen with physical therapy for second set of skilled hands. Patient completed assessment of ADLs and BUE strength, ROM (see details below). Patient completed lower body dressing setup assist to do left sock. Reports is too tired to don prosthetic right now. Patient declined standing at this time d/t being too tired from dialysis.  Due to deficits (listed below) patient has decreased independence with ADLs and functional mobility, anticipate he would benefit from home health occupational therapy  ,      DEFICITS/IMPAIRMENTS:  Performance deficits / Impairments: Decreased functional mobility ;Decreased high-level IADLs;Decreased ADL status;Decreased endurance    Patient will benefit from skilled intervention to address the above impairments.  Patient's rehabilitation potential is considered to be Prognosis: Fair.  Factors which may influence rehabilitation potential include:   [] 
Patient in room 349 dialyzed for 3 hours.  Tolerated tx fair. UF goal was decreased due to asymptomatic hypotension.  RIJ CVC functioning without complication accessing or BFR after reversing lines.   mL/min   mL/min  2138 ml UF removed with a Net UF removal of 1638 ml.  Report given to Chip William RN with all questions answered.        Pre Dialysis:  Received report from Primary Nurse Chip William, RN, pt on a bed. No s/s of distress noted on RA . RIJ TDC assessed no s/s of infection noted. Dressing to same clean, dry and intact and dated 03/29/2024.    Intra Dialysis:  Time out / safety process performed per policy, Tx initiated at 13:36. TDC flowing with ease. For hemodynamic stability UF goal set at 3000 ml as tolerated.     14:30 UF Off due to asymptomatic B/P. B/P 91/41, pulse 82.    15:00 UF turned on. UF goal decreased to 2000 mL. B/P 107/52, pulse 83.    16:29 Acetaminophen 650 mg administered PO due to c/o throat pain 6/10. Primary Nurse aware.    Pt offered assistance with repositioning every 2 hours/prn. Vascular access visible and line connections remained intact throughout entire duration of treatment. Vital signs checked every 15 mins, WNL.     Post Dialysis:  Tx completed at 16:38 Tolerated fair,1638 mL removed. De-accessed per protocol.    RIJ TDC locked with Heparin 1.6 ml in arterial lumen, and 1.6 ml in venous lumen of RIJ CVC. Red Curios Caps placed at lumen ends. Catheter dressing clean, dry and intact. Patient remains in stable condition. Bed in lowest position, wheels locked. Call bell within reach. Report given to Primary Nurse Chip William RN.  
Physical Therapy      1515: Pt dressed and waiting to leave.    
Physical Therapy Goals:  Initiated 4/1/2024 to be met within 7-10 days.  Short Term Goals  Short Term Goal 1: Patient will perform supine to/from sit with independence.  Short Term Goal 2: Patient will perform sit to/from stand with mod I/RW in prep for ambulation activity with R BK prosthesis.  Short Term Goal 3: Patient will perform ambulation 50 ft with mod I/RW with R BK prosthesis for increased functional mobility at discharge.      PHYSICAL THERAPY EVALUATION    Patient: Evan Huizar (53 y.o. male)  Date: 4/1/2024  Primary Diagnosis: Abdominal pain [R10.9]  Nausea and vomiting, unspecified vomiting type [R11.2]     Precautions: Fall Risk  PLOF: lives alone in ground level apartment; ambulates with FWW (since rollator is broken) using R BK prosthesis.  Drives self to HD treatments    ASSESSMENT :  Based on the objective data described below, the patient presents with decreased strength BLE (grossly 4-/5), decreased endurance for activity and decreased independence in functional mobility with regard to bed mobility,  transfers, and gait. Pt seen with OT for second set of skilled hands.  Pt found resting in bed and c/o post HD treatment fatigue, but agreeable to participate with evaluation.  Pt supine to sit EOB with supervision. Tolerated sitting EOB ~8 min before requesting to return to supine.  Pt declines standing trial with prosthesis at this time due to fatigue.  Returned to supine and left in NAD with all needs in reach.  Will continue IPPT to address deficits noted and achieve goals noted above.  Note, pt eager for home discharge, stating he does not want to return to rehab again (having spent extended time there in the past).    DEFICITS/IMPAIRMENTS:   Body Structures, Functions, Activity Limitations Requiring Skilled Therapeutic Intervention: Decreased functional mobility ;Decreased strength;Decreased endurance;Decreased sensation;Decreased ROM    Patient will benefit from skilled intervention to 
dose reduction was achieved through use of a standardized protocol tailored for this examination and automatic exposure control for dose modulation.  FINDINGS: The ventricles and sulci are normal in size, shape and configuration. There is no significant white matter disease. There is no intracranial hemorrhage, extra-axial collection, or mass effect. The basilar cisterns are open. No CT evidence of acute infarct. The bone windows demonstrate no abnormalities. The visualized portions of the paranasal sinuses and mastoid air cells are clear.     No acute intracranial abnormality.     CT CHEST ABDOMEN PELVIS W CONTRAST Additional Contrast? None    Result Date: 3/29/2024  INDICATION: Abdominal pain COMPARISON: No comparisons TECHNIQUE:  Following the uneventful intravenous administration of 100 cc Isovue-300, 5 mm axial images were obtained through the chest, abdomen, and pelvis. Oral contrast was not administered.  Coronal and sagittal reconstructions were generated. CT dose reduction was achieved through use of a standardized protocol tailored for this examination and automatic exposure control for dose modulation. FINDINGS: THYROID: No nodule. MEDIASTINUM: No mass or lymphadenopathy. SOMMER: No mass or lymphadenopathy. THORACIC AORTA: No dissection or aneurysm. MAIN PULMONARY ARTERY: Normal in caliber. TRACHEA/BRONCHI: Patent. ESOPHAGUS: Thickened distal esophagus. HEART: Mild cardiomegaly. Coronary calcifications. Coronary artery calcium: present PLEURA: Small mild bilateral pleural effusions. LUNGS: Hazy groundglass opacity in the lungs likely representing pulmonary edema LIVER: No mass or biliary dilatation. GALLBLADDER: Distended gallbladder with pericholecystic edema or wall thickening. Evaluation is limited by motion. SPLEEN: No mass. PANCREAS: No mass or ductal dilatation. ADRENALS: Unremarkable. KIDNEYS: Tiny nonobstructing right renal stone. Small hypodensity left kidney too small to characterize STOMACH: 
Unremarkable. SMALL BOWEL: No dilatation or wall thickening. COLON: Colonic diverticulosis APPENDIX: Unremarkable. PERITONEUM: No ascites or pneumoperitoneum. RETROPERITONEUM: Atherosclerotic disease REPRODUCTIVE ORGANS: Unremarkable URINARY BLADDER: Thickened urinary bladder BONES: No destructive bone lesion. ADDITIONAL COMMENTS: N/A     1. Distended gallbladder with suggestion of gallbladder wall thickening or pericholecystic edema. Recommend correlation for acute cholecystitis. 2. Thickened urinary bladder. This may represent cystitis. Correlate with urinalysis. 3. Diffusely thickened distal esophagus 4. Small bilateral pleural effusions.    XR CHEST 1 VIEW    Result Date: 3/29/2024  INDICATION:  Shortness of breath EXAM: Single portable view of chest 1542. Comparison:None Findings: Cardiac silhouette is enlarged. Pulmonary vasculature is mildly prominent. Diffuse interstitial prominence without effusion or pneumothorax. Tunneled right-sided central line overlying the superior vena cava unchanged.     1. Enlarged cardiac silhouette, mild interstitial pulmonary edema      ISMA WEST MD

## 2024-04-02 NOTE — DISCHARGE SUMMARY
Discharge Summary    Patient: Evan Huizar MRN: [id number]  CSN: [id number]    YOB: 1970  Age: 53 y.o.  Sex: male    DOA: 3/29/2024 LOS:  LOS: 4 days   Discharge Date:      Primary Care Provider:  Jazmyn Rivera APRN - NP    Admission Diagnoses: Abdominal pain [R10.9]  Nausea and vomiting, unspecified vomiting type [R11.2]    Discharge Diagnoses:    Active Hospital Problems    Diagnosis Date Noted    Abdominal pain [R10.9] 03/29/2024    Encephalopathy acute [G93.40] 02/19/2024    Hypoglycemia [E16.2] 02/19/2024    Chronic anemia [D64.9] 08/27/2022    Hypertension [I10] 06/01/2022    ESRD (end stage renal disease) on dialysis ([facility]) [N18.6, Z99.2] 01/29/2020    Hx of BKA, right ([facility]) [Z89.511] 01/29/2020       Discharge Condition: stable     Discharge Medications:        Medication List        START taking these medications      blood glucose monitor kit and supplies  Dispense sufficient amount for indicated testing frequency plus additional to accommodate PRN testing needs. Dispense all needed supplies to include: monitor, strips, lancing device, lancets, control solutions, alcohol swabs.     Glucagon Emergency 1 MG Kit  As instructed     levoFLOXacin 500 MG tablet  Commonly known as: LEVAQUIN  Take 1 tablet by mouth every 48 hours            CONTINUE taking these medications      amLODIPine 10 MG tablet  Commonly known as: NORVASC  Take 1 tablet by mouth daily     calcitRIOL 0.5 MCG capsule  Commonly known as: ROCALTROL  Take 1 capsule by mouth daily     cyanocobalamin 1000 MCG tablet     hydrALAZINE 100 MG tablet  Commonly known as: APRESOLINE  Take 1 tablet by mouth every 8 hours     labetalol 200 MG tablet  Commonly known as: NORMODYNE  Take 1 tablet by mouth 2 times daily     losartan 100 MG tablet  Commonly known as: Cozaar  Take 1 tablet by mouth daily     sevelamer 800 MG tablet  Commonly known as: RENVELA               Where to Get Your Medications        Information about where to get

## 2024-04-04 ENCOUNTER — HOME CARE VISIT (OUTPATIENT)
Age: 54
End: 2024-04-04

## 2024-04-09 ENCOUNTER — HOME CARE VISIT (OUTPATIENT)
Age: 54
End: 2024-04-09

## 2024-05-03 ENCOUNTER — HOSPITAL ENCOUNTER (INPATIENT)
Facility: HOSPITAL | Age: 54
LOS: 4 days | Discharge: HOME HEALTH | DRG: 640 | End: 2024-05-07
Attending: HOSPITALIST | Admitting: HOSPITALIST
Payer: MEDICARE

## 2024-05-03 ENCOUNTER — APPOINTMENT (OUTPATIENT)
Facility: HOSPITAL | Age: 54
DRG: 640 | End: 2024-05-03
Payer: MEDICARE

## 2024-05-03 DIAGNOSIS — Z99.2 ESRD ON HEMODIALYSIS (HCC): ICD-10-CM

## 2024-05-03 DIAGNOSIS — R19.7 NAUSEA, VOMITING AND DIARRHEA: ICD-10-CM

## 2024-05-03 DIAGNOSIS — R11.2 NAUSEA, VOMITING AND DIARRHEA: ICD-10-CM

## 2024-05-03 DIAGNOSIS — R06.02 SHORTNESS OF BREATH: ICD-10-CM

## 2024-05-03 DIAGNOSIS — R09.02 HYPOXEMIA: Primary | ICD-10-CM

## 2024-05-03 DIAGNOSIS — N18.6 ESRD ON HEMODIALYSIS (HCC): ICD-10-CM

## 2024-05-03 LAB
ALBUMIN SERPL-MCNC: 3.3 G/DL (ref 3.4–5)
ALBUMIN/GLOB SERPL: 0.7 (ref 0.8–1.7)
ALP SERPL-CCNC: 104 U/L (ref 45–117)
ALT SERPL-CCNC: 17 U/L (ref 16–61)
ANION GAP SERPL CALC-SCNC: 14 MMOL/L (ref 3–18)
AST SERPL-CCNC: 13 U/L (ref 10–38)
BASOPHILS # BLD: 0 K/UL (ref 0–0.1)
BASOPHILS NFR BLD: 0 % (ref 0–2)
BILIRUB SERPL-MCNC: 0.7 MG/DL (ref 0.2–1)
BUN SERPL-MCNC: 75 MG/DL (ref 7–18)
BUN/CREAT SERPL: 7 (ref 12–20)
CALCIUM SERPL-MCNC: 8.4 MG/DL (ref 8.5–10.1)
CHLORIDE SERPL-SCNC: 102 MMOL/L (ref 100–111)
CO2 SERPL-SCNC: 22 MMOL/L (ref 21–32)
CREAT SERPL-MCNC: 11.2 MG/DL (ref 0.6–1.3)
DIFFERENTIAL METHOD BLD: ABNORMAL
EOSINOPHIL # BLD: 0.2 K/UL (ref 0–0.4)
EOSINOPHIL NFR BLD: 4 % (ref 0–5)
ERYTHROCYTE [DISTWIDTH] IN BLOOD BY AUTOMATED COUNT: 19.9 % (ref 11.6–14.5)
FLUAV RNA SPEC QL NAA+PROBE: NOT DETECTED
FLUBV RNA SPEC QL NAA+PROBE: NOT DETECTED
GLOBULIN SER CALC-MCNC: 4.5 G/DL (ref 2–4)
GLUCOSE BLD STRIP.AUTO-MCNC: 78 MG/DL (ref 70–110)
GLUCOSE SERPL-MCNC: 81 MG/DL (ref 74–99)
HCT VFR BLD AUTO: 24.4 % (ref 36–48)
HGB BLD-MCNC: 8.4 G/DL (ref 13–16)
IMM GRANULOCYTES # BLD AUTO: 0 K/UL (ref 0–0.04)
IMM GRANULOCYTES NFR BLD AUTO: 1 % (ref 0–0.5)
LYMPHOCYTES # BLD: 0.3 K/UL (ref 0.9–3.6)
LYMPHOCYTES NFR BLD: 7 % (ref 21–52)
MCH RBC QN AUTO: 26.1 PG (ref 24–34)
MCHC RBC AUTO-ENTMCNC: 34.4 G/DL (ref 31–37)
MCV RBC AUTO: 75.8 FL (ref 78–100)
MONOCYTES # BLD: 0.4 K/UL (ref 0.05–1.2)
MONOCYTES NFR BLD: 8 % (ref 3–10)
NEUTS SEG # BLD: 3.7 K/UL (ref 1.8–8)
NEUTS SEG NFR BLD: 80 % (ref 40–73)
NRBC # BLD: 0 K/UL (ref 0–0.01)
NRBC BLD-RTO: 0 PER 100 WBC
PLATELET # BLD AUTO: 116 K/UL (ref 135–420)
POTASSIUM SERPL-SCNC: 4.1 MMOL/L (ref 3.5–5.5)
PROT SERPL-MCNC: 7.8 G/DL (ref 6.4–8.2)
RBC # BLD AUTO: 3.22 M/UL (ref 4.35–5.65)
RBC MORPH BLD: ABNORMAL
SARS-COV-2 RNA RESP QL NAA+PROBE: NOT DETECTED
SODIUM SERPL-SCNC: 138 MMOL/L (ref 136–145)
TROPONIN I SERPL HS-MCNC: 58 NG/L (ref 0–78)
WBC # BLD AUTO: 4.6 K/UL (ref 4.6–13.2)

## 2024-05-03 PROCEDURE — 6360000002 HC RX W HCPCS: Performed by: STUDENT IN AN ORGANIZED HEALTH CARE EDUCATION/TRAINING PROGRAM

## 2024-05-03 PROCEDURE — 5A1D70Z PERFORMANCE OF URINARY FILTRATION, INTERMITTENT, LESS THAN 6 HOURS PER DAY: ICD-10-PCS | Performed by: HOSPITALIST

## 2024-05-03 PROCEDURE — 71045 X-RAY EXAM CHEST 1 VIEW: CPT

## 2024-05-03 PROCEDURE — 80053 COMPREHEN METABOLIC PANEL: CPT

## 2024-05-03 PROCEDURE — 6360000004 HC RX CONTRAST MEDICATION: Performed by: HOSPITALIST

## 2024-05-03 PROCEDURE — 74177 CT ABD & PELVIS W/CONTRAST: CPT

## 2024-05-03 PROCEDURE — 82962 GLUCOSE BLOOD TEST: CPT

## 2024-05-03 PROCEDURE — 93005 ELECTROCARDIOGRAM TRACING: CPT | Performed by: PHYSICIAN ASSISTANT

## 2024-05-03 PROCEDURE — 6360000002 HC RX W HCPCS: Performed by: HOSPITALIST

## 2024-05-03 PROCEDURE — 83036 HEMOGLOBIN GLYCOSYLATED A1C: CPT

## 2024-05-03 PROCEDURE — 99285 EMERGENCY DEPT VISIT HI MDM: CPT

## 2024-05-03 PROCEDURE — 6370000000 HC RX 637 (ALT 250 FOR IP): Performed by: HOSPITALIST

## 2024-05-03 PROCEDURE — 71275 CT ANGIOGRAPHY CHEST: CPT

## 2024-05-03 PROCEDURE — 84484 ASSAY OF TROPONIN QUANT: CPT

## 2024-05-03 PROCEDURE — 85025 COMPLETE CBC W/AUTO DIFF WBC: CPT

## 2024-05-03 PROCEDURE — 87636 SARSCOV2 & INF A&B AMP PRB: CPT

## 2024-05-03 PROCEDURE — 90935 HEMODIALYSIS ONE EVALUATION: CPT

## 2024-05-03 PROCEDURE — 1100000003 HC PRIVATE W/ TELEMETRY

## 2024-05-03 PROCEDURE — 2580000003 HC RX 258: Performed by: HOSPITALIST

## 2024-05-03 RX ORDER — DEXTROSE MONOHYDRATE 100 MG/ML
INJECTION, SOLUTION INTRAVENOUS CONTINUOUS PRN
Status: DISCONTINUED | OUTPATIENT
Start: 2024-05-03 | End: 2024-05-07 | Stop reason: HOSPADM

## 2024-05-03 RX ORDER — GLUCAGON 1 MG/ML
1 KIT INJECTION PRN
Status: DISCONTINUED | OUTPATIENT
Start: 2024-05-03 | End: 2024-05-07 | Stop reason: HOSPADM

## 2024-05-03 RX ORDER — LOSARTAN POTASSIUM 50 MG/1
100 TABLET ORAL DAILY
Status: DISCONTINUED | OUTPATIENT
Start: 2024-05-03 | End: 2024-05-07 | Stop reason: HOSPADM

## 2024-05-03 RX ORDER — CALCITRIOL 0.25 UG/1
0.5 CAPSULE, LIQUID FILLED ORAL DAILY
Status: DISCONTINUED | OUTPATIENT
Start: 2024-05-03 | End: 2024-05-07 | Stop reason: HOSPADM

## 2024-05-03 RX ORDER — HEPARIN SODIUM 1000 [USP'U]/ML
3200 INJECTION, SOLUTION INTRAVENOUS; SUBCUTANEOUS PRN
Status: DISCONTINUED | OUTPATIENT
Start: 2024-05-03 | End: 2024-05-07 | Stop reason: HOSPADM

## 2024-05-03 RX ORDER — ACETAMINOPHEN 325 MG/1
650 TABLET ORAL EVERY 6 HOURS PRN
Status: DISCONTINUED | OUTPATIENT
Start: 2024-05-03 | End: 2024-05-07 | Stop reason: HOSPADM

## 2024-05-03 RX ORDER — POLYETHYLENE GLYCOL 3350 17 G/17G
17 POWDER, FOR SOLUTION ORAL DAILY PRN
Status: DISCONTINUED | OUTPATIENT
Start: 2024-05-03 | End: 2024-05-07 | Stop reason: HOSPADM

## 2024-05-03 RX ORDER — ONDANSETRON 2 MG/ML
4 INJECTION INTRAMUSCULAR; INTRAVENOUS
Status: ACTIVE | OUTPATIENT
Start: 2024-05-03 | End: 2024-05-04

## 2024-05-03 RX ORDER — ONDANSETRON 4 MG/1
4 TABLET, ORALLY DISINTEGRATING ORAL EVERY 8 HOURS PRN
Status: DISCONTINUED | OUTPATIENT
Start: 2024-05-03 | End: 2024-05-07 | Stop reason: HOSPADM

## 2024-05-03 RX ORDER — ONDANSETRON 2 MG/ML
4 INJECTION INTRAMUSCULAR; INTRAVENOUS EVERY 6 HOURS PRN
Status: DISCONTINUED | OUTPATIENT
Start: 2024-05-03 | End: 2024-05-07 | Stop reason: HOSPADM

## 2024-05-03 RX ORDER — ACETAMINOPHEN 650 MG/1
650 SUPPOSITORY RECTAL EVERY 6 HOURS PRN
Status: DISCONTINUED | OUTPATIENT
Start: 2024-05-03 | End: 2024-05-07 | Stop reason: HOSPADM

## 2024-05-03 RX ORDER — SODIUM CHLORIDE 0.9 % (FLUSH) 0.9 %
5-40 SYRINGE (ML) INJECTION PRN
Status: DISCONTINUED | OUTPATIENT
Start: 2024-05-03 | End: 2024-05-07 | Stop reason: HOSPADM

## 2024-05-03 RX ORDER — HEPARIN SODIUM 5000 [USP'U]/ML
5000 INJECTION, SOLUTION INTRAVENOUS; SUBCUTANEOUS EVERY 8 HOURS SCHEDULED
Status: DISCONTINUED | OUTPATIENT
Start: 2024-05-03 | End: 2024-05-07 | Stop reason: HOSPADM

## 2024-05-03 RX ORDER — SEVELAMER CARBONATE 800 MG/1
800 TABLET, FILM COATED ORAL
Status: DISCONTINUED | OUTPATIENT
Start: 2024-05-03 | End: 2024-05-07 | Stop reason: HOSPADM

## 2024-05-03 RX ORDER — SODIUM CHLORIDE 9 MG/ML
INJECTION, SOLUTION INTRAVENOUS PRN
Status: DISCONTINUED | OUTPATIENT
Start: 2024-05-03 | End: 2024-05-07 | Stop reason: HOSPADM

## 2024-05-03 RX ORDER — HYDRALAZINE HYDROCHLORIDE 20 MG/ML
10 INJECTION INTRAMUSCULAR; INTRAVENOUS EVERY 6 HOURS PRN
Status: DISCONTINUED | OUTPATIENT
Start: 2024-05-03 | End: 2024-05-07 | Stop reason: HOSPADM

## 2024-05-03 RX ORDER — HYDRALAZINE HYDROCHLORIDE 50 MG/1
100 TABLET, FILM COATED ORAL EVERY 8 HOURS SCHEDULED
Status: DISCONTINUED | OUTPATIENT
Start: 2024-05-03 | End: 2024-05-07 | Stop reason: HOSPADM

## 2024-05-03 RX ORDER — LABETALOL 200 MG/1
200 TABLET, FILM COATED ORAL 2 TIMES DAILY
Status: DISCONTINUED | OUTPATIENT
Start: 2024-05-03 | End: 2024-05-07 | Stop reason: HOSPADM

## 2024-05-03 RX ORDER — AMLODIPINE BESYLATE 5 MG/1
10 TABLET ORAL DAILY
Status: DISCONTINUED | OUTPATIENT
Start: 2024-05-03 | End: 2024-05-07 | Stop reason: HOSPADM

## 2024-05-03 RX ORDER — SODIUM CHLORIDE 0.9 % (FLUSH) 0.9 %
5-40 SYRINGE (ML) INJECTION EVERY 12 HOURS SCHEDULED
Status: DISCONTINUED | OUTPATIENT
Start: 2024-05-03 | End: 2024-05-07 | Stop reason: HOSPADM

## 2024-05-03 RX ORDER — PANTOPRAZOLE SODIUM 40 MG/1
40 TABLET, DELAYED RELEASE ORAL
Status: DISCONTINUED | OUTPATIENT
Start: 2024-05-04 | End: 2024-05-07 | Stop reason: HOSPADM

## 2024-05-03 RX ADMIN — IOPAMIDOL 75 ML: 755 INJECTION, SOLUTION INTRAVENOUS at 18:24

## 2024-05-03 RX ADMIN — SODIUM CHLORIDE, PRESERVATIVE FREE 10 ML: 5 INJECTION INTRAVENOUS at 23:00

## 2024-05-03 RX ADMIN — AMLODIPINE BESYLATE 10 MG: 5 TABLET ORAL at 23:08

## 2024-05-03 RX ADMIN — LABETALOL HYDROCHLORIDE 200 MG: 200 TABLET, FILM COATED ORAL at 23:08

## 2024-05-03 RX ADMIN — HEPARIN SODIUM 5000 UNITS: 5000 INJECTION INTRAVENOUS; SUBCUTANEOUS at 23:00

## 2024-05-03 RX ADMIN — HEPARIN SODIUM 3200 UNITS: 1000 INJECTION INTRAVENOUS; SUBCUTANEOUS at 22:41

## 2024-05-03 NOTE — H&P
History & Physical    Patient: Evan Huizar MRN: [id number]  CSN: [id number]    YOB: 1970  Age: 53 y.o.  Sex: male      DOA: 5/3/2024  Primary Care Provider:  Jazmyn Rivera APRN - NP      Assessment/Plan     Active Hospital Problems    Diagnosis Date Noted    Hypoxia [R09.02] 05/03/2024    Diarrhea [R19.7] 05/03/2024    Volume overload [E87.70] 02/19/2024    Chronic anemia [D64.9] 08/27/2022    Hypertension [I10] 06/01/2022    ESRD (end stage renal disease) on dialysis (HCC) [N18.6, Z99.2] 01/29/2020    Hx of BKA, right ([facility]) [Z89.511] 01/29/2020       Admit to tele   Hypoxia   Like due to fluid overloaded   Will  have cta chest -r/o PE   Nephrologist consulted , planning hd today   Continue nc oxygen support   Will check trop x1 negative -will see the trend     Diarrhea   Recent abx use, need to r/o c diff   Stool study ordered   Will have ct abdomen      Ersd on hd today, nephrologist consulted , planning hd today  (MWF)  Completed 1 hr HD today        Htn continue home meds tomorrow -hypotension during hd today, will put hydralazine prn      BKA rt fall precaution      Chronic anemia   Due to chronic renal disease   Continue monitoring     Hx of hypoglycemia   Hypoglycemia protocol     Full code   Please note that this dictation was completed with [employer], the [employer] voice recognition software.  Quite often unanticipated grammatical, syntax, homophones, and other interpretive errors are inadvertently transcribed by the computer software.  Please disregard these errors.  Please excuse any errors that have escaped final proofreading    Estimate  length of stay : 2-3 day    DVT : heparin ppi proph  CC: diarrhea and sob        HPI:     Evan Huizar is a 53 y.o. male with hx of esrd on hd on MWF, htn , rt BKA presented to ER due to hypoxia and transient hypotension in HD clinic and diarrhea one time today. He reported that he dose not feel good today and he got transient hypotension and hypoxia

## 2024-05-03 NOTE — ED PROVIDER NOTES
pneumonia, COVID, flu, electrolyte abnormality    53 y.o. male  In evaluation of the above differential diagnosis, consideration was given to the following tests and treatments: Chest x-ray which shows pulmonary edema not significantly changed from previous.  However patient only completed 1 hour of dialysis is known for noncompliance and still fluid overloaded requiring 2 L of oxygen by nasal cannula for hypoxia.  His labs reveal baseline anemia, creatinine is up 11 but no significant electrolyte abnormality.  Patient had significant diarrhea upon arrival to ED but no melena and has remained hemodynamically stable.  Abdominal exam is benign he denies any pain.  Also no chest pain EKG without ischemic changes and his troponin is normal stool culture sent and pending at this jesse.  Nephrologist consulted who sent patient to ED from the dialysis center and request admission and will also order dialysis for today.  Hospitalist consulted who added CTA of the chest and CT abdomen pelvis.  I discussed each of these tests and considerations with the patient. They agree with the plan of admission.      FINAL IMPRESSION     1. Hypoxemia    2. ESRD on hemodialysis (HCC)    3. Nausea, vomiting and diarrhea            DISPOSITION/PLAN   DISPOSITION Admitted 05/03/2024 06:04:35 PM           PATIENT REFERRED TO:  No follow-up provider specified.       DISCHARGE MEDICATIONS:     Medication List        CONTINUE taking these medications      blood glucose monitor kit and supplies  Dispense sufficient amount for indicated testing frequency plus additional to accommodate PRN testing needs. Dispense all needed supplies to include: monitor, strips, lancing device, lancets, control solutions, alcohol swabs.            ASK your doctor about these medications      amLODIPine 10 MG tablet  Commonly known as: NORVASC  Take 1 tablet by mouth daily     calcitRIOL 0.5 MCG capsule  Commonly known as: ROCALTROL  Take 1 capsule by mouth daily

## 2024-05-03 NOTE — ED NOTES
TRANSFER - OUT REPORT:    Verbal report given to Nakia on Evan Huizar  being transferred to [facility] for routine progression of patient care       Report consisted of patient's Situation, Background, Assessment and   Recommendations(SBAR).     Information from the following report(s) Nurse Handoff Report, Index, ED Encounter Summary, ED SBAR, MAR, and Event Log was reviewed with the receiving nurse.    [place] Fall Assessment:                           Lines:   Extended Dwell Peripherial IV 05/03/24 Right Brachial (Active)       Tunneled Hemodialysis Catheter Right Subclavian (Active)        Opportunity for questions and clarification was provided.      Patient transported with:  Monitor and Registered Nurse

## 2024-05-03 NOTE — ED TRIAGE NOTES
Pt arrived via EMS. C/C vomiting and hypotension at dialysis. Pt completed 1.5 hours of dialysis.     Pt was on 2L O2 nasal cannula with EMS. SPO2 was 97% then.

## 2024-05-04 ENCOUNTER — APPOINTMENT (OUTPATIENT)
Facility: HOSPITAL | Age: 54
DRG: 640 | End: 2024-05-04
Payer: MEDICARE

## 2024-05-04 LAB
ANION GAP SERPL CALC-SCNC: 7 MMOL/L (ref 3–18)
BASOPHILS # BLD: 0 K/UL (ref 0–0.1)
BASOPHILS NFR BLD: 1 % (ref 0–2)
BUN SERPL-MCNC: 39 MG/DL (ref 7–18)
BUN/CREAT SERPL: 6 (ref 12–20)
CALCIUM SERPL-MCNC: 8.3 MG/DL (ref 8.5–10.1)
CHLORIDE SERPL-SCNC: 105 MMOL/L (ref 100–111)
CO2 SERPL-SCNC: 26 MMOL/L (ref 21–32)
CREAT SERPL-MCNC: 7.09 MG/DL (ref 0.6–1.3)
DIFFERENTIAL METHOD BLD: ABNORMAL
EOSINOPHIL # BLD: 0.2 K/UL (ref 0–0.4)
EOSINOPHIL NFR BLD: 4 % (ref 0–5)
ERYTHROCYTE [DISTWIDTH] IN BLOOD BY AUTOMATED COUNT: 20.1 % (ref 11.6–14.5)
EST. AVERAGE GLUCOSE BLD GHB EST-MCNC: 85 MG/DL
ETHANOL SERPL-MCNC: <3 MG/DL (ref 0–3)
GLUCOSE BLD STRIP.AUTO-MCNC: 102 MG/DL (ref 70–110)
GLUCOSE BLD STRIP.AUTO-MCNC: 110 MG/DL (ref 70–110)
GLUCOSE BLD STRIP.AUTO-MCNC: 156 MG/DL (ref 70–110)
GLUCOSE BLD STRIP.AUTO-MCNC: 88 MG/DL (ref 70–110)
GLUCOSE BLD STRIP.AUTO-MCNC: 92 MG/DL (ref 70–110)
GLUCOSE BLD STRIP.AUTO-MCNC: 94 MG/DL (ref 70–110)
GLUCOSE SERPL-MCNC: 89 MG/DL (ref 74–99)
HBA1C MFR BLD: 4.6 % (ref 4.2–5.6)
HCT VFR BLD AUTO: 23.8 % (ref 36–48)
HGB BLD-MCNC: 8 G/DL (ref 13–16)
IMM GRANULOCYTES # BLD AUTO: 0 K/UL (ref 0–0.04)
IMM GRANULOCYTES NFR BLD AUTO: 0 % (ref 0–0.5)
LYMPHOCYTES # BLD: 0.3 K/UL (ref 0.9–3.6)
LYMPHOCYTES NFR BLD: 8 % (ref 21–52)
MCH RBC QN AUTO: 26 PG (ref 24–34)
MCHC RBC AUTO-ENTMCNC: 33.6 G/DL (ref 31–37)
MCV RBC AUTO: 77.3 FL (ref 78–100)
MONOCYTES # BLD: 0.4 K/UL (ref 0.05–1.2)
MONOCYTES NFR BLD: 10 % (ref 3–10)
NEUTS SEG # BLD: 3.3 K/UL (ref 1.8–8)
NEUTS SEG NFR BLD: 77 % (ref 40–73)
NRBC # BLD: 0 K/UL (ref 0–0.01)
NRBC BLD-RTO: 0 PER 100 WBC
PLATELET # BLD AUTO: 113 K/UL (ref 135–420)
POTASSIUM SERPL-SCNC: 3.7 MMOL/L (ref 3.5–5.5)
RBC # BLD AUTO: 3.08 M/UL (ref 4.35–5.65)
SODIUM SERPL-SCNC: 138 MMOL/L (ref 136–145)
TROPONIN I SERPL HS-MCNC: 106 NG/L (ref 0–78)
TROPONIN I SERPL HS-MCNC: 128 NG/L (ref 0–78)
TROPONIN I SERPL HS-MCNC: 70 NG/L (ref 0–78)
WBC # BLD AUTO: 4.3 K/UL (ref 4.6–13.2)

## 2024-05-04 PROCEDURE — 2580000003 HC RX 258: Performed by: HOSPITALIST

## 2024-05-04 PROCEDURE — 82962 GLUCOSE BLOOD TEST: CPT

## 2024-05-04 PROCEDURE — 85025 COMPLETE CBC W/AUTO DIFF WBC: CPT

## 2024-05-04 PROCEDURE — 84484 ASSAY OF TROPONIN QUANT: CPT

## 2024-05-04 PROCEDURE — 36415 COLL VENOUS BLD VENIPUNCTURE: CPT

## 2024-05-04 PROCEDURE — 1100000003 HC PRIVATE W/ TELEMETRY

## 2024-05-04 PROCEDURE — 6370000000 HC RX 637 (ALT 250 FOR IP): Performed by: HOSPITALIST

## 2024-05-04 PROCEDURE — 6360000002 HC RX W HCPCS: Performed by: HOSPITALIST

## 2024-05-04 PROCEDURE — 80048 BASIC METABOLIC PNL TOTAL CA: CPT

## 2024-05-04 PROCEDURE — 2700000000 HC OXYGEN THERAPY PER DAY

## 2024-05-04 PROCEDURE — 70450 CT HEAD/BRAIN W/O DYE: CPT

## 2024-05-04 PROCEDURE — 97162 PT EVAL MOD COMPLEX 30 MIN: CPT

## 2024-05-04 PROCEDURE — 87040 BLOOD CULTURE FOR BACTERIA: CPT

## 2024-05-04 PROCEDURE — 82077 ASSAY SPEC XCP UR&BREATH IA: CPT

## 2024-05-04 PROCEDURE — 90935 HEMODIALYSIS ONE EVALUATION: CPT

## 2024-05-04 RX ADMIN — SODIUM CHLORIDE, PRESERVATIVE FREE 10 ML: 5 INJECTION INTRAVENOUS at 21:29

## 2024-05-04 RX ADMIN — CALCITRIOL CAPSULES 0.25 MCG 0.5 MCG: 0.25 CAPSULE ORAL at 08:43

## 2024-05-04 RX ADMIN — PANTOPRAZOLE SODIUM 40 MG: 40 TABLET, DELAYED RELEASE ORAL at 07:00

## 2024-05-04 RX ADMIN — SEVELAMER CARBONATE 800 MG: 800 TABLET, FILM COATED ORAL at 18:17

## 2024-05-04 RX ADMIN — AMLODIPINE BESYLATE 10 MG: 5 TABLET ORAL at 08:43

## 2024-05-04 RX ADMIN — SEVELAMER CARBONATE 800 MG: 800 TABLET, FILM COATED ORAL at 08:43

## 2024-05-04 RX ADMIN — HEPARIN SODIUM 5000 UNITS: 5000 INJECTION INTRAVENOUS; SUBCUTANEOUS at 21:28

## 2024-05-04 RX ADMIN — LABETALOL HYDROCHLORIDE 200 MG: 200 TABLET, FILM COATED ORAL at 08:42

## 2024-05-04 RX ADMIN — SODIUM CHLORIDE, PRESERVATIVE FREE 10 ML: 5 INJECTION INTRAVENOUS at 08:43

## 2024-05-04 RX ADMIN — HEPARIN SODIUM 5000 UNITS: 5000 INJECTION INTRAVENOUS; SUBCUTANEOUS at 07:00

## 2024-05-04 RX ADMIN — ACETAMINOPHEN 650 MG: 325 TABLET ORAL at 08:46

## 2024-05-04 RX ADMIN — LABETALOL HYDROCHLORIDE 200 MG: 200 TABLET, FILM COATED ORAL at 21:28

## 2024-05-04 RX ADMIN — HEPARIN SODIUM 5000 UNITS: 5000 INJECTION INTRAVENOUS; SUBCUTANEOUS at 15:15

## 2024-05-04 NOTE — CONSULTS
RENAL CONSULT  2024    Patient:  Evan Huizar  :  1970  Gender:  male  MRN #:  [id number]    Reason for Consult: Hypoxia and ESRD related care     History of Present Illness:    Evan Huizar is a 53 y.o. year old male ESRD on HD,  DM-2, anemia, uncontrolled hypertension , hyperphosphatemia non compliance to dialysis prescription, dietary/life style modification was sent from dialysis unit for hypoxia and transient hypotension during dialysis on 5/3/24 .    He reports multiple episodes of diarrhea and had vomiting  yesterday .   He had dialysis last night . This morning he was felling little better . Denied shortness of breath , still has diarrhea , no chest pain . Feels weak and tired.         Past Medical History:   Diagnosis Date    Chronic kidney disease     Diabetes (HCC)     Heart failure (HCC)     Hemodialysis patient (HCC)     Hypertension     Psychiatric illness 2023    Sickle cell trait (HCC)      Past Surgical History:   Procedure Laterality Date    IR NONTUNNELED VASCULAR CATHETER  2022    IR NONTUNNELED VASCULAR CATHETER 2022 [facility]    IR NONTUNNELED VASCULAR CATHETER  2022    IR NONTUNNELED VASCULAR CATHETER  2023    IR NONTUNNELED VASCULAR CATHETER 2023 [facility]    IR THRMB/INFUSION DIAYSIS CIRCUIT  6/3/2022    IR THRMB/INFUSION DIAYSIS CIRCUIT 6/3/2022 [facility]    IR THRMB/INFUSION DIAYSIS CIRCUIT  6/3/2022    IR TUNNELED CATHETER PLACEMENT GREATER THAN 5 YEARS  2022    IR TUNNELED CATHETER PLACEMENT GREATER THAN 5 YEARS 2022 [facility] IR    IR TUNNELED CATHETER PLACEMENT GREATER THAN 5 YEARS  2022    IR TUNNELED CATHETER PLACEMENT GREATER THAN 5 YEARS  2023    IR TUNNELED CATHETER PLACEMENT GREATER THAN 5 YEARS 2023 [facility]    IR TUNNELED CATHETER PLACEMENT GREATER THAN 5 YEARS  2023    IR TUNNELED CATHETER PLACEMENT GREATER THAN 5 YEARS 2023 Mora Chowdary MD [facility] RAD [facility]GIO IR    
troponin is 0-78, patient peaked at 128 and is already normalized to 70  Shortness of breath due to missed hemodialysis/noncompliance  History of mitral valve endocarditis without any follow-up, no history of overt heart failure   Continue hemodialysis  Continue regular C. difficile  Continue rest of the cardiac medication      Plan.  No evidence of ACS  Echocardiogram is pending  History of infective endocarditis more than 1 year ago without any follow-up  non compliance  Echocardiogram is pending  Discussed with patient  Thank you for allowing me to participate in the management of this pleasant patient.  Please call if you have any questions or concerns.          Signed By: JESSI HAN MD     May 4, 2024

## 2024-05-04 NOTE — PLAN OF CARE
Problem: Discharge Planning  Goal: Discharge to home or other facility with appropriate resources  Outcome: Progressing     Problem: Chronic Conditions and Co-morbidities  Goal: Patient's chronic conditions and co-morbidity symptoms are monitored and maintained or improved  5/4/2024 0934 by Nakia Malagon, RN  Outcome: Progressing  5/3/2024 2042 by Annamaria Branham, RN  Outcome: Progressing  Flowsheets (Taken 5/3/2024 1920 by Eduardo Montemayor, RN)  Care Plan - Patient's Chronic Conditions and Co-Morbidity Symptoms are Monitored and Maintained or Improved:   Monitor and assess patient's chronic conditions and comorbid symptoms for stability, deterioration, or improvement   Collaborate with multidisciplinary team to address chronic and comorbid conditions and prevent exacerbation or deterioration   Update acute care plan with appropriate goals if chronic or comorbid symptoms are exacerbated and prevent overall improvement and discharge     Problem: Safety - Adult  Goal: Free from fall injury  Outcome: Progressing     Problem: Pain  Goal: Verbalizes/displays adequate comfort level or baseline comfort level  Outcome: Progressing

## 2024-05-04 NOTE — CARE COORDINATION
05/04/24 1555   Service Assessment   Patient Orientation Alert and Oriented   Cognition Alert   History Provided By Patient   Primary Caregiver Self   Support Systems Family Members   PCP Verified by CM Yes   Last Visit to PCP Within last 3 months   Prior Functional Level Independent in ADLs/IADLs  (uses a rolator for mobility)   Current Functional Level Independent in ADLs/IADLs   Can patient return to prior living arrangement Yes   Ability to make needs known: Good   Family able to assist with home care needs: Yes  (plans to have his brother assist with care at home  when discharged.)   Would you like for me to discuss the discharge plan with any other family members/significant others, and if so, who? Yes  (Edgar Huizar/brother)   Financial Resources Medicaid;Medicare   Community Resources Other (Comment)  [facility]   CM/SW Referral Other (see comment)   Social/Functional History   Lives With Alone   Type of Home Apartment   ADL Assistance Independent   Ambulation Assistance Independent   Active  Yes   Occupation On disability   Discharge Planning   Type of Residence Apartment   Living Arrangements Alone   Current Services Prior To Admission Other (Comment);Durable Medical Equipment  [facility] of [facility] Vincent)   Current DME Prior to Arrival Other (Comment)  (Rolator)   Potential Assistance Needed Transportation   DME Ordered? No   Potential Assistance Purchasing Medications No   Type of Home Care Services None   Patient expects to be discharged to: Apartment   Services At/After Discharge   Transition of Care Consult (CM Consult) Discharge Planning   Condition of Participation: Discharge Planning   The Plan for Transition of Care is related to the following treatment goals: \"To Get better\"     Patient in isolation, CM spoke to patient via phone to discuss d/c planning. Patient will likely need transport assistance at d/c. Primary

## 2024-05-04 NOTE — DIALYSIS
Treatment summary  Received report from Vesna LI Rn  Received patient in the room sitting on the BSC. A/o x3, VSS.  R chest TDC accessed without s/s of complication, treatment initiated.    Dialyzed patient in  room 343  for 3.5 hours.     Total Uf 3500  ml  Net UF 3000 ml     Treatment note:           Patient tolerate treatment well remain in stable condition.    Offered assistance with repositioning every 2 hours.      Vascular access visible at all times and line connected at all   times during treatment. Access R chest TDC Functioning well de accessed  Without complications. Flushed with NS and capped.      Report given to Jocelyn PARDO RN with all questions answered.

## 2024-05-04 NOTE — PLAN OF CARE
INTERVENTION:  HEMODYNAMIC STABILIZATION  MAINTAIN BP WNL WHILE ON HD.     INTERVENTION:  FLUID MANAGEMENT  WILL ATTEMPT 3000 ML TOTAL FLUID REMOVAL AS TOLERATED.     INTERVENTION:  METABOLIC/ELECTROLYTE MANAGEMENT  2.0 POTASSIUM 2.5 CALCIUM DIALYSATE USED WITH HD TODAY.     INTERVENTION:  HEMODIALYSIS ACCESS SITE MANAGEMENT  RIGHT SIDE CVC USING ASEPTIC TECHNIQUE.     GOAL:  SIGNS AND SYMPTOMS OF LISTED POTENTIAL PROBLEMS WILL BE ABSENT OR MANAGEABLE.     OUTCOME:  PROGRESSING.     HD PLANNED FOR 3 HOURS TODAY AS PER PATIENT'S REQUEST.    Today at 2042 - Progressing by Annamaria Branham RN  Problem: Chronic Conditions and Co-morbidities  Goal: Patient's chronic conditions and co-morbidity symptoms are monitored and maintained or improved  Outcome: Progressing

## 2024-05-05 LAB
ANION GAP SERPL CALC-SCNC: 6 MMOL/L (ref 3–18)
BASOPHILS # BLD: 0.1 K/UL (ref 0–0.1)
BASOPHILS NFR BLD: 1 % (ref 0–2)
BUN SERPL-MCNC: 22 MG/DL (ref 7–18)
BUN/CREAT SERPL: 4 (ref 12–20)
CALCIUM SERPL-MCNC: 8.8 MG/DL (ref 8.5–10.1)
CHLORIDE SERPL-SCNC: 103 MMOL/L (ref 100–111)
CO2 SERPL-SCNC: 29 MMOL/L (ref 21–32)
CREAT SERPL-MCNC: 5.14 MG/DL (ref 0.6–1.3)
DIFFERENTIAL METHOD BLD: ABNORMAL
EKG ATRIAL RATE: 85 BPM
EKG DIAGNOSIS: NORMAL
EKG P AXIS: 63 DEGREES
EKG P-R INTERVAL: 166 MS
EKG Q-T INTERVAL: 432 MS
EKG QRS DURATION: 90 MS
EKG QTC CALCULATION (BAZETT): 514 MS
EKG R AXIS: 2 DEGREES
EKG T AXIS: 106 DEGREES
EKG VENTRICULAR RATE: 85 BPM
EOSINOPHIL # BLD: 0.3 K/UL (ref 0–0.4)
EOSINOPHIL NFR BLD: 5 % (ref 0–5)
ERYTHROCYTE [DISTWIDTH] IN BLOOD BY AUTOMATED COUNT: 20.2 % (ref 11.6–14.5)
GLUCOSE BLD STRIP.AUTO-MCNC: 117 MG/DL (ref 70–110)
GLUCOSE BLD STRIP.AUTO-MCNC: 88 MG/DL (ref 70–110)
GLUCOSE BLD STRIP.AUTO-MCNC: 89 MG/DL (ref 70–110)
GLUCOSE BLD STRIP.AUTO-MCNC: 92 MG/DL (ref 70–110)
GLUCOSE SERPL-MCNC: 94 MG/DL (ref 74–99)
HCT VFR BLD AUTO: 27 % (ref 36–48)
HGB BLD-MCNC: 8.8 G/DL (ref 13–16)
IMM GRANULOCYTES # BLD AUTO: 0 K/UL (ref 0–0.04)
IMM GRANULOCYTES NFR BLD AUTO: 0 % (ref 0–0.5)
LYMPHOCYTES # BLD: 0.5 K/UL (ref 0.9–3.6)
LYMPHOCYTES NFR BLD: 9 % (ref 21–52)
MCH RBC QN AUTO: 25.7 PG (ref 24–34)
MCHC RBC AUTO-ENTMCNC: 32.6 G/DL (ref 31–37)
MCV RBC AUTO: 78.7 FL (ref 78–100)
MONOCYTES # BLD: 0.6 K/UL (ref 0.05–1.2)
MONOCYTES NFR BLD: 11 % (ref 3–10)
NEUTS SEG # BLD: 3.7 K/UL (ref 1.8–8)
NEUTS SEG NFR BLD: 73 % (ref 40–73)
NRBC # BLD: 0 K/UL (ref 0–0.01)
NRBC BLD-RTO: 0 PER 100 WBC
PLATELET # BLD AUTO: 126 K/UL (ref 135–420)
POTASSIUM SERPL-SCNC: 3.7 MMOL/L (ref 3.5–5.5)
RBC # BLD AUTO: 3.43 M/UL (ref 4.35–5.65)
SODIUM SERPL-SCNC: 138 MMOL/L (ref 136–145)
WBC # BLD AUTO: 5.1 K/UL (ref 4.6–13.2)

## 2024-05-05 PROCEDURE — 2580000003 HC RX 258: Performed by: HOSPITALIST

## 2024-05-05 PROCEDURE — 87324 CLOSTRIDIUM AG IA: CPT

## 2024-05-05 PROCEDURE — 6360000002 HC RX W HCPCS: Performed by: HOSPITALIST

## 2024-05-05 PROCEDURE — 87493 C DIFF AMPLIFIED PROBE: CPT

## 2024-05-05 PROCEDURE — 87506 IADNA-DNA/RNA PROBE TQ 6-11: CPT

## 2024-05-05 PROCEDURE — 82962 GLUCOSE BLOOD TEST: CPT

## 2024-05-05 PROCEDURE — 6370000000 HC RX 637 (ALT 250 FOR IP): Performed by: HOSPITALIST

## 2024-05-05 PROCEDURE — 87449 NOS EACH ORGANISM AG IA: CPT

## 2024-05-05 PROCEDURE — 1100000003 HC PRIVATE W/ TELEMETRY

## 2024-05-05 PROCEDURE — 2700000000 HC OXYGEN THERAPY PER DAY

## 2024-05-05 PROCEDURE — 80048 BASIC METABOLIC PNL TOTAL CA: CPT

## 2024-05-05 PROCEDURE — 87209 SMEAR COMPLEX STAIN: CPT

## 2024-05-05 PROCEDURE — 85025 COMPLETE CBC W/AUTO DIFF WBC: CPT

## 2024-05-05 PROCEDURE — 87177 OVA AND PARASITES SMEARS: CPT

## 2024-05-05 PROCEDURE — 36415 COLL VENOUS BLD VENIPUNCTURE: CPT

## 2024-05-05 RX ADMIN — SODIUM CHLORIDE, PRESERVATIVE FREE 10 ML: 5 INJECTION INTRAVENOUS at 21:30

## 2024-05-05 RX ADMIN — HEPARIN SODIUM 5000 UNITS: 5000 INJECTION INTRAVENOUS; SUBCUTANEOUS at 14:10

## 2024-05-05 RX ADMIN — PANTOPRAZOLE SODIUM 40 MG: 40 TABLET, DELAYED RELEASE ORAL at 06:51

## 2024-05-05 RX ADMIN — LABETALOL HYDROCHLORIDE 200 MG: 200 TABLET, FILM COATED ORAL at 21:30

## 2024-05-05 RX ADMIN — SEVELAMER CARBONATE 800 MG: 800 TABLET, FILM COATED ORAL at 16:56

## 2024-05-05 RX ADMIN — LABETALOL HYDROCHLORIDE 200 MG: 200 TABLET, FILM COATED ORAL at 08:00

## 2024-05-05 RX ADMIN — HEPARIN SODIUM 5000 UNITS: 5000 INJECTION INTRAVENOUS; SUBCUTANEOUS at 06:51

## 2024-05-05 RX ADMIN — CALCITRIOL CAPSULES 0.25 MCG 0.5 MCG: 0.25 CAPSULE ORAL at 08:00

## 2024-05-05 RX ADMIN — SODIUM CHLORIDE, PRESERVATIVE FREE 10 ML: 5 INJECTION INTRAVENOUS at 08:00

## 2024-05-05 RX ADMIN — SEVELAMER CARBONATE 800 MG: 800 TABLET, FILM COATED ORAL at 11:39

## 2024-05-05 RX ADMIN — HEPARIN SODIUM 5000 UNITS: 5000 INJECTION INTRAVENOUS; SUBCUTANEOUS at 21:30

## 2024-05-05 NOTE — PLAN OF CARE
Problem: Discharge Planning  Goal: Discharge to home or other facility with appropriate resources  5/5/2024 0848 by Nakia Malagon RN  Outcome: Progressing  5/5/2024 0308 by Emma Banks RN  Outcome: Progressing     Problem: Chronic Conditions and Co-morbidities  Goal: Patient's chronic conditions and co-morbidity symptoms are monitored and maintained or improved  5/5/2024 0848 by Nakia Malagon RN  Outcome: Progressing  5/5/2024 0308 by Emma Banks RN  Outcome: Progressing     Problem: Safety - Adult  Goal: Free from fall injury  5/5/2024 0848 by Nakia Malagon RN  Outcome: Progressing  5/5/2024 0308 by Emma Banks RN  Outcome: Progressing     Problem: Pain  Goal: Verbalizes/displays adequate comfort level or baseline comfort level  5/5/2024 0848 by Nakia Malagon RN  Outcome: Progressing  5/5/2024 0308 by Emma Banks RN  Outcome: Progressing

## 2024-05-06 ENCOUNTER — APPOINTMENT (OUTPATIENT)
Facility: HOSPITAL | Age: 54
DRG: 640 | End: 2024-05-06
Attending: INTERNAL MEDICINE
Payer: MEDICARE

## 2024-05-06 LAB
ANION GAP SERPL CALC-SCNC: 7 MMOL/L (ref 3–18)
BASOPHILS # BLD: 0 K/UL (ref 0–0.1)
BASOPHILS NFR BLD: 1 % (ref 0–2)
BUN SERPL-MCNC: 34 MG/DL (ref 7–18)
BUN/CREAT SERPL: 5 (ref 12–20)
C COLI+JEJUNI TUF STL QL NAA+PROBE: NEGATIVE
C DIFF GDH STL QL: POSITIVE
C DIFF TOX A+B STL QL IA: NEGATIVE
C DIFF TOXIN INTERPRETATION: ABNORMAL
C. DIFFICILE TOXIN MOLECULAR: NEGATIVE
CALCIUM SERPL-MCNC: 9.3 MG/DL (ref 8.5–10.1)
CHLORIDE SERPL-SCNC: 104 MMOL/L (ref 100–111)
CO2 SERPL-SCNC: 26 MMOL/L (ref 21–32)
CREAT SERPL-MCNC: 7.2 MG/DL (ref 0.6–1.3)
DIFFERENTIAL METHOD BLD: ABNORMAL
EC STX1+STX2 GENES STL QL NAA+PROBE: NEGATIVE
ECHO AO ASC DIAM: 2.6 CM
ECHO AO ASCENDING AORTA INDEX: 1.34 CM/M2
ECHO AO ROOT DIAM: 3.1 CM
ECHO AO ROOT INDEX: 1.6 CM/M2
ECHO AV AREA PEAK VELOCITY: 1.5 CM2
ECHO AV AREA VTI: 1.6 CM2
ECHO AV AREA/BSA PEAK VELOCITY: 0.8 CM2/M2
ECHO AV AREA/BSA VTI: 0.8 CM2/M2
ECHO AV MEAN GRADIENT: 11 MMHG
ECHO AV MEAN VELOCITY: 1.6 M/S
ECHO AV PEAK GRADIENT: 23 MMHG
ECHO AV PEAK VELOCITY: 2.4 M/S
ECHO AV VELOCITY RATIO: 0.54
ECHO AV VTI: 45.9 CM
ECHO BSA: 1.99 M2
ECHO LA DIAMETER INDEX: 2.58 CM/M2
ECHO LA DIAMETER: 5 CM
ECHO LA TO AORTIC ROOT RATIO: 1.61
ECHO LA VOL A-L A2C: 103 ML (ref 18–58)
ECHO LA VOL A-L A4C: 128 ML (ref 18–58)
ECHO LA VOL BP: 113 ML (ref 18–58)
ECHO LA VOL MOD A2C: 98 ML (ref 18–58)
ECHO LA VOL MOD A4C: 120 ML (ref 18–58)
ECHO LA VOL/BSA BIPLANE: 58 ML/M2 (ref 16–34)
ECHO LA VOLUME AREA LENGTH: 119 ML
ECHO LA VOLUME INDEX A-L A2C: 53 ML/M2 (ref 16–34)
ECHO LA VOLUME INDEX A-L A4C: 66 ML/M2 (ref 16–34)
ECHO LA VOLUME INDEX AREA LENGTH: 61 ML/M2 (ref 16–34)
ECHO LA VOLUME INDEX MOD A2C: 51 ML/M2 (ref 16–34)
ECHO LA VOLUME INDEX MOD A4C: 62 ML/M2 (ref 16–34)
ECHO LV E' LATERAL VELOCITY: 7 CM/S
ECHO LV E' SEPTAL VELOCITY: 5 CM/S
ECHO LV EDV A2C: 116 ML
ECHO LV EDV A4C: 130 ML
ECHO LV EDV BP: 126 ML (ref 67–155)
ECHO LV EDV INDEX A4C: 67 ML/M2
ECHO LV EDV INDEX BP: 65 ML/M2
ECHO LV EDV NDEX A2C: 60 ML/M2
ECHO LV EJECTION FRACTION A2C: 53 %
ECHO LV EJECTION FRACTION A4C: 59 %
ECHO LV EJECTION FRACTION BIPLANE: 55 % (ref 55–100)
ECHO LV ESV A2C: 55 ML
ECHO LV ESV A4C: 54 ML
ECHO LV ESV BP: 57 ML (ref 22–58)
ECHO LV ESV INDEX A2C: 28 ML/M2
ECHO LV ESV INDEX A4C: 28 ML/M2
ECHO LV ESV INDEX BP: 29 ML/M2
ECHO LV FRACTIONAL SHORTENING: 34 % (ref 28–44)
ECHO LV INTERNAL DIMENSION DIASTOLE INDEX: 2.58 CM/M2
ECHO LV INTERNAL DIMENSION DIASTOLIC: 5 CM (ref 4.2–5.9)
ECHO LV INTERNAL DIMENSION SYSTOLIC INDEX: 1.7 CM/M2
ECHO LV INTERNAL DIMENSION SYSTOLIC: 3.3 CM
ECHO LV IVSD: 1.3 CM (ref 0.6–1)
ECHO LV MASS 2D: 338.8 G (ref 88–224)
ECHO LV MASS INDEX 2D: 174.6 G/M2 (ref 49–115)
ECHO LV POSTERIOR WALL DIASTOLIC: 1.8 CM (ref 0.6–1)
ECHO LV RELATIVE WALL THICKNESS RATIO: 0.72
ECHO LVOT AREA: 2.8 CM2
ECHO LVOT AV VTI INDEX: 0.56
ECHO LVOT DIAM: 1.9 CM
ECHO LVOT MEAN GRADIENT: 4 MMHG
ECHO LVOT PEAK GRADIENT: 7 MMHG
ECHO LVOT PEAK VELOCITY: 1.3 M/S
ECHO LVOT STROKE VOLUME INDEX: 37.7 ML/M2
ECHO LVOT SV: 73.1 ML
ECHO LVOT VTI: 25.8 CM
ECHO MV A VELOCITY: 1.07 M/S
ECHO MV AREA VTI: 2.2 CM2
ECHO MV E DECELERATION TIME (DT): 197 MS
ECHO MV E VELOCITY: 1.15 M/S
ECHO MV E/A RATIO: 1.07
ECHO MV E/E' LATERAL: 16.43
ECHO MV E/E' RATIO (AVERAGED): 19.71
ECHO MV LVOT VTI INDEX: 1.31
ECHO MV MAX VELOCITY: 1.3 M/S
ECHO MV MEAN GRADIENT: 3 MMHG
ECHO MV MEAN VELOCITY: 0.9 M/S
ECHO MV PEAK GRADIENT: 7 MMHG
ECHO MV VTI: 33.9 CM
ECHO RA END SYSTOLIC VOLUME APICAL 4 CHAMBER INDEX BSA: 38 ML/M2
ECHO RA VOLUME: 74 ML
ECHO RV INTERNAL DIMENSION: 4.6 CM
ECHO RV TAPSE: 2.1 CM (ref 1.7–?)
ECHO RVOT MEAN GRADIENT: 3 MMHG
ECHO RVOT PEAK GRADIENT: 5 MMHG
ECHO RVOT PEAK VELOCITY: 1.1 M/S
ECHO RVOT VTI: 21.8 CM
ECHO TV REGURGITANT MAX VELOCITY: 2.77 M/S
ECHO TV REGURGITANT PEAK GRADIENT: 31 MMHG
EOSINOPHIL # BLD: 0.3 K/UL (ref 0–0.4)
EOSINOPHIL NFR BLD: 5 % (ref 0–5)
ERYTHROCYTE [DISTWIDTH] IN BLOOD BY AUTOMATED COUNT: 20.3 % (ref 11.6–14.5)
ETEC ELTA+ESTB GENES STL QL NAA+PROBE: NEGATIVE
GLUCOSE BLD STRIP.AUTO-MCNC: 117 MG/DL (ref 70–110)
GLUCOSE BLD STRIP.AUTO-MCNC: 128 MG/DL (ref 70–110)
GLUCOSE BLD STRIP.AUTO-MCNC: 87 MG/DL (ref 70–110)
GLUCOSE SERPL-MCNC: 79 MG/DL (ref 74–99)
HCT VFR BLD AUTO: 26.6 % (ref 36–48)
HGB BLD-MCNC: 8.6 G/DL (ref 13–16)
IMM GRANULOCYTES # BLD AUTO: 0 K/UL (ref 0–0.04)
IMM GRANULOCYTES NFR BLD AUTO: 0 % (ref 0–0.5)
LYMPHOCYTES # BLD: 0.6 K/UL (ref 0.9–3.6)
LYMPHOCYTES NFR BLD: 11 % (ref 21–52)
MCH RBC QN AUTO: 25.7 PG (ref 24–34)
MCHC RBC AUTO-ENTMCNC: 32.3 G/DL (ref 31–37)
MCV RBC AUTO: 79.6 FL (ref 78–100)
MONOCYTES # BLD: 0.6 K/UL (ref 0.05–1.2)
MONOCYTES NFR BLD: 11 % (ref 3–10)
NEUTS SEG # BLD: 4.1 K/UL (ref 1.8–8)
NEUTS SEG NFR BLD: 72 % (ref 40–73)
NRBC # BLD: 0 K/UL (ref 0–0.01)
NRBC BLD-RTO: 0 PER 100 WBC
P SHIGELLOIDES DNA STL QL NAA+PROBE: NEGATIVE
PLATELET # BLD AUTO: 122 K/UL (ref 135–420)
POTASSIUM SERPL-SCNC: 4 MMOL/L (ref 3.5–5.5)
RBC # BLD AUTO: 3.34 M/UL (ref 4.35–5.65)
REFLEX: ABNORMAL
SALMONELLA SP SPAO STL QL NAA+PROBE: NEGATIVE
SHIGELLA SP+EIEC IPAH STL QL NAA+PROBE: NEGATIVE
SODIUM SERPL-SCNC: 137 MMOL/L (ref 136–145)
V CHOL+PARA+VUL DNA STL QL NAA+NON-PROBE: NEGATIVE
WBC # BLD AUTO: 5.6 K/UL (ref 4.6–13.2)
Y ENTEROCOL DNA STL QL NAA+NON-PROBE: NEGATIVE

## 2024-05-06 PROCEDURE — 36415 COLL VENOUS BLD VENIPUNCTURE: CPT

## 2024-05-06 PROCEDURE — 97165 OT EVAL LOW COMPLEX 30 MIN: CPT

## 2024-05-06 PROCEDURE — 2580000003 HC RX 258: Performed by: HOSPITALIST

## 2024-05-06 PROCEDURE — 93306 TTE W/DOPPLER COMPLETE: CPT

## 2024-05-06 PROCEDURE — 80048 BASIC METABOLIC PNL TOTAL CA: CPT

## 2024-05-06 PROCEDURE — 82962 GLUCOSE BLOOD TEST: CPT

## 2024-05-06 PROCEDURE — 97535 SELF CARE MNGMENT TRAINING: CPT

## 2024-05-06 PROCEDURE — 6370000000 HC RX 637 (ALT 250 FOR IP): Performed by: HOSPITALIST

## 2024-05-06 PROCEDURE — 85025 COMPLETE CBC W/AUTO DIFF WBC: CPT

## 2024-05-06 PROCEDURE — 6360000002 HC RX W HCPCS: Performed by: HOSPITALIST

## 2024-05-06 PROCEDURE — 90935 HEMODIALYSIS ONE EVALUATION: CPT

## 2024-05-06 PROCEDURE — 1100000003 HC PRIVATE W/ TELEMETRY

## 2024-05-06 RX ADMIN — LABETALOL HYDROCHLORIDE 200 MG: 200 TABLET, FILM COATED ORAL at 08:29

## 2024-05-06 RX ADMIN — LABETALOL HYDROCHLORIDE 200 MG: 200 TABLET, FILM COATED ORAL at 21:28

## 2024-05-06 RX ADMIN — SODIUM CHLORIDE, PRESERVATIVE FREE 10 ML: 5 INJECTION INTRAVENOUS at 21:28

## 2024-05-06 RX ADMIN — SEVELAMER CARBONATE 800 MG: 800 TABLET, FILM COATED ORAL at 17:41

## 2024-05-06 RX ADMIN — PANTOPRAZOLE SODIUM 40 MG: 40 TABLET, DELAYED RELEASE ORAL at 06:27

## 2024-05-06 RX ADMIN — HEPARIN SODIUM 5000 UNITS: 5000 INJECTION INTRAVENOUS; SUBCUTANEOUS at 14:23

## 2024-05-06 RX ADMIN — SEVELAMER CARBONATE 800 MG: 800 TABLET, FILM COATED ORAL at 08:29

## 2024-05-06 RX ADMIN — CALCITRIOL CAPSULES 0.25 MCG 0.5 MCG: 0.25 CAPSULE ORAL at 08:29

## 2024-05-06 RX ADMIN — HEPARIN SODIUM 5000 UNITS: 5000 INJECTION INTRAVENOUS; SUBCUTANEOUS at 06:27

## 2024-05-06 RX ADMIN — SODIUM CHLORIDE, PRESERVATIVE FREE 10 ML: 5 INJECTION INTRAVENOUS at 08:30

## 2024-05-06 RX ADMIN — HEPARIN SODIUM 5000 UNITS: 5000 INJECTION INTRAVENOUS; SUBCUTANEOUS at 21:28

## 2024-05-06 NOTE — PLAN OF CARE
Problem: Discharge Planning  Goal: Discharge to home or other facility with appropriate resources  5/5/2024 2216 by Mathew Quevedo RN  Outcome: Progressing  5/5/2024 0848 by Nakia Malagon RN  Outcome: Progressing     Problem: Chronic Conditions and Co-morbidities  Goal: Patient's chronic conditions and co-morbidity symptoms are monitored and maintained or improved  5/5/2024 2216 by Mathew Quevedo RN  Outcome: Progressing  5/5/2024 0848 by Nakia Malagon RN  Outcome: Progressing     Problem: Safety - Adult  Goal: Free from fall injury  5/5/2024 2216 by Mathew Quevedo RN  Outcome: Progressing  5/5/2024 0848 by Nakia Malagon RN  Outcome: Progressing     Problem: Pain  Goal: Verbalizes/displays adequate comfort level or baseline comfort level  5/5/2024 2216 by Mathew Quevedo RN  Outcome: Progressing  5/5/2024 0848 by Nakia Malagon RN  Outcome: Progressing

## 2024-05-06 NOTE — PLAN OF CARE
Problem: Discharge Planning  Goal: Discharge to home or other facility with appropriate resources  5/6/2024 0918 by Nakia Malagon RN  Outcome: Progressing  5/5/2024 2216 by Mathew Quevedo RN  Outcome: Progressing     Problem: Chronic Conditions and Co-morbidities  Goal: Patient's chronic conditions and co-morbidity symptoms are monitored and maintained or improved  5/6/2024 0918 by Nakia Malagon RN  Outcome: Progressing  5/5/2024 2216 by Mathew Quevedo RN  Outcome: Progressing     Problem: Safety - Adult  Goal: Free from fall injury  5/6/2024 0918 by Nakia Malagon RN  Outcome: Progressing  5/5/2024 2216 by Mathew Quevedo RN  Outcome: Progressing     Problem: Pain  Goal: Verbalizes/displays adequate comfort level or baseline comfort level  5/6/2024 0918 by Nakia Malagon RN  Outcome: Progressing  5/5/2024 2216 by Mathew Quevedo RN  Outcome: Progressing

## 2024-05-06 NOTE — DIALYSIS
Treatment summary  Received report from Vesna LI. Rn    Received patient in bed a/o x4 VSS  Dialyzed patient in  room 343  for 3.5 hours.     Total Uf 3500  ml  Net UF 3000 ml     Treatment note:           Patient tolerate treatment well remain in stable condition.    Offered assistance with repositioning every 2 hours.      Vascular access visible at all times and line connected at all   times during treatment. Access R chest TDC Functioning well   De accessed without complications. Flushed with NS and capped.     Report given to Carmita PARDO RN with all questions answered.

## 2024-05-07 VITALS
OXYGEN SATURATION: 100 % | DIASTOLIC BLOOD PRESSURE: 78 MMHG | TEMPERATURE: 98.3 F | HEART RATE: 78 BPM | WEIGHT: 187 LBS | HEIGHT: 66 IN | BODY MASS INDEX: 30.05 KG/M2 | SYSTOLIC BLOOD PRESSURE: 134 MMHG | RESPIRATION RATE: 16 BRPM

## 2024-05-07 LAB
GLUCOSE BLD STRIP.AUTO-MCNC: 80 MG/DL (ref 70–110)
GLUCOSE BLD STRIP.AUTO-MCNC: 94 MG/DL (ref 70–110)

## 2024-05-07 PROCEDURE — 6360000002 HC RX W HCPCS: Performed by: HOSPITALIST

## 2024-05-07 PROCEDURE — 2580000003 HC RX 258: Performed by: HOSPITALIST

## 2024-05-07 PROCEDURE — 82962 GLUCOSE BLOOD TEST: CPT

## 2024-05-07 PROCEDURE — 6370000000 HC RX 637 (ALT 250 FOR IP): Performed by: HOSPITALIST

## 2024-05-07 RX ADMIN — AMLODIPINE BESYLATE 10 MG: 5 TABLET ORAL at 08:59

## 2024-05-07 RX ADMIN — CALCITRIOL CAPSULES 0.25 MCG 0.5 MCG: 0.25 CAPSULE ORAL at 08:10

## 2024-05-07 RX ADMIN — HEPARIN SODIUM 5000 UNITS: 5000 INJECTION INTRAVENOUS; SUBCUTANEOUS at 06:08

## 2024-05-07 RX ADMIN — SEVELAMER CARBONATE 800 MG: 800 TABLET, FILM COATED ORAL at 12:02

## 2024-05-07 RX ADMIN — PANTOPRAZOLE SODIUM 40 MG: 40 TABLET, DELAYED RELEASE ORAL at 06:09

## 2024-05-07 RX ADMIN — LABETALOL HYDROCHLORIDE 200 MG: 200 TABLET, FILM COATED ORAL at 08:10

## 2024-05-07 RX ADMIN — SODIUM CHLORIDE, PRESERVATIVE FREE 10 ML: 5 INJECTION INTRAVENOUS at 08:10

## 2024-05-07 NOTE — PLAN OF CARE
Problem: Discharge Planning  Goal: Discharge to home or other facility with appropriate resources  5/7/2024 1311 by Shoshana Anguiano RN  Outcome: Adequate for Discharge  5/7/2024 1059 by Nakia Malagon RN  Outcome: Progressing  5/6/2024 2331 by Emma Banks RN  Outcome: Progressing     Problem: Chronic Conditions and Co-morbidities  Goal: Patient's chronic conditions and co-morbidity symptoms are monitored and maintained or improved  5/7/2024 1311 by Shoshana Anguiano RN  Outcome: Adequate for Discharge  5/7/2024 1059 by Nakia Malagon RN  Outcome: Progressing  5/6/2024 2331 by Emma Banks RN  Outcome: Progressing     Problem: Safety - Adult  Goal: Free from fall injury  5/7/2024 1311 by Shoshana Anguiano RN  Outcome: Adequate for Discharge  5/7/2024 1059 by Nakia Malagon RN  Outcome: Progressing  5/6/2024 2331 by Emma Banks RN  Outcome: Progressing     Problem: Pain  Goal: Verbalizes/displays adequate comfort level or baseline comfort level  5/7/2024 1311 by Shoshana Anguiano RN  Outcome: Adequate for Discharge  5/7/2024 1059 by Nakia Malagon RN  Outcome: Progressing  5/6/2024 2331 by Emma Banks RN  Outcome: Progressing

## 2024-05-07 NOTE — PLAN OF CARE
Problem: Discharge Planning  Goal: Discharge to home or other facility with appropriate resources  5/7/2024 1059 by Nakia Malagon RN  Outcome: Progressing  5/6/2024 2331 by Emma Banks RN  Outcome: Progressing     Problem: Chronic Conditions and Co-morbidities  Goal: Patient's chronic conditions and co-morbidity symptoms are monitored and maintained or improved  5/7/2024 1059 by Nakia Malagon RN  Outcome: Progressing  5/6/2024 2331 by Emma Banks RN  Outcome: Progressing     Problem: Safety - Adult  Goal: Free from fall injury  5/7/2024 1059 by Nakia Malagon RN  Outcome: Progressing  5/6/2024 2331 by Emma Banks RN  Outcome: Progressing     Problem: Pain  Goal: Verbalizes/displays adequate comfort level or baseline comfort level  5/7/2024 1059 by Nakia Malagon RN  Outcome: Progressing  5/6/2024 2331 by Emma Banks RN  Outcome: Progressing

## 2024-05-07 NOTE — DISCHARGE SUMMARY
Discharge Summary    Patient: Evan Huizar MRN: [id number]  CSN: [id number]    YOB: 1970  Age: 53 y.o.  Sex: male    DOA: 5/3/2024 LOS:  LOS: 4 days   Discharge Date: 5/7/2024     Primary Care Provider:  Jazmyn Rivera APRN - NP    Admission Diagnoses: Hypoxemia [R09.02]  Hypoxia [R09.02]  Nausea, vomiting and diarrhea [R11.2, R19.7]  ESRD on hemodialysis (HCC) [N18.6, Z99.2]    Discharge Diagnoses:    Active Hospital Problems    Diagnosis Date Noted    Hypoxia [R09.02] 05/03/2024    Diarrhea [R19.7] 05/03/2024    Volume overload [E87.70] 02/19/2024    Chronic anemia [D64.9] 08/27/2022    Hypertension [I10] 06/01/2022    ESRD (end stage renal disease) on dialysis ([facility]) [N18.6, Z99.2] 01/29/2020    Hx of BKA, right ([facility]) [Z89.511] 01/29/2020       Discharge Condition: stable     Discharge Medications:        Medication List        CONTINUE taking these medications      amLODIPine 10 MG tablet  Commonly known as: NORVASC  Take 1 tablet by mouth daily     blood glucose monitor kit and supplies  Dispense sufficient amount for indicated testing frequency plus additional to accommodate PRN testing needs. Dispense all needed supplies to include: monitor, strips, lancing device, lancets, control solutions, alcohol swabs.     calcitRIOL 0.5 MCG capsule  Commonly known as: ROCALTROL  Take 1 capsule by mouth daily     cyanocobalamin 1000 MCG tablet     Glucagon Emergency 1 MG Kit  As instructed     labetalol 200 MG tablet  Commonly known as: NORMODYNE  Take 1 tablet by mouth 2 times daily     levoFLOXacin 500 MG tablet  Commonly known as: LEVAQUIN  Take 1 tablet by mouth every 48 hours     sevelamer 800 MG tablet  Commonly known as: RENVELA            STOP taking these medications      hydrALAZINE 100 MG tablet  Commonly known as: APRESOLINE     losartan 100 MG tablet  Commonly known as: Cozaar              Procedures : none     Consults: Nephrology and cardiologist       PHYSICAL EXAM   Visit Vitals  BP

## 2024-05-08 NOTE — PROGRESS NOTES
Hospitalist Progress Note    Patient: Evan Huizar MRN: [id number]  CSN: [id number]    YOB: 1970  Age: 53 y.o.  Sex: male    DOA: 5/3/2024 LOS:  LOS: 2 days                Assessment and Plan:    Principal Problem:    Hypoxia  Active Problems:    Hx of BKA, right (HCC)    ESRD (end stage renal disease) on dialysis (HCC)    Hypertension    Chronic anemia    Volume overload    Diarrhea  Resolved Problems:    * No resolved hospital problems. *    Hypoxia: due to fluid overolad. This may be chronic in nature         Diarrhea: it is better and less yesterday and none today . I am not sure if he also had a stomch bug as well      ESRD: see nephrologist note     HTN; some of his medicines are on hold . It seems to be going up and down      Chronic anemia     Altered mental status: head ct negative.   He is much better today and back to normal       Positive troponin:  this appears to be stable. Appreciate cardiology note         Chief complaint:  Evan Huizar is a 53 y.o. male with hx of esrd on hd on MWF, htn , rt BKA presented to ER due to hypoxia and transient hypotension in HD clinic and diarrhea one time today.          Subjective:  He feels better  Much more alert today  No diarrhea      Review of systems:    General: No fevers or chills.  Cardiovascular: No chest pain or pressure. No palpitations.   Pulmonary: No shortness of breath.   Gastrointestinal: No nausea, vomiting.     Objective:    Vital signs/Intake and Output:  Visit Vitals  BP 99/66   Pulse 82   Temp 98.2 °F (36.8 °C) (Oral)   Resp 18   Ht 1.676 m (5' 6\")   Wt 85 kg (187 lb 6.3 oz)   SpO2 94%   BMI 30.25 kg/m²     Current Shift:  No intake/output data recorded.  Last three shifts:  05/03 1901 - 05/05 0700  In: 1476 [P.O.:476]  Out: 3957     Physical Exam:  General: NAD, AAOx3. Non-toxic.  HEENT: NC/AT.  PERRLA, EOMI.  MMM.  Lungs: Nml inspection. CTA B/L. No wheezing, rales or rhonchi.   Heart:  S1S2 RRR,  PMI mid 5th IC space. No 
  Hospitalist Progress Note    Patient: Evan Huizar MRN: [id number]  CSN: [id number]    YOB: 1970  Age: 53 y.o.  Sex: male    DOA: 5/3/2024 LOS:  LOS: 1 day                Assessment and Plan:    Principal Problem:    Hypoxia  Active Problems:    Hx of BKA, right (HCC)    ESRD (end stage renal disease) on dialysis (HCC)    Hypertension    Chronic anemia    Volume overload    Diarrhea  Resolved Problems:    * No resolved hospital problems. *      Hypoxia: due to fluid overolad. This may be chronic in nature       Diarrhea: it is better. I am not sure if he also had a stomch bug as well     ESRD: make sure nephrologist     HTN; some of his medicines are on hold     Chronic anemia    Altered mental status: head ct negative.   He is still sleepy. I wonder if some of this is uremia          Chief complaint:  Evan Huizar is a 53 y.o. male with hx of esrd on hd on MWF, htn , rt BKA presented to ER due to hypoxia and transient hypotension in HD clinic and diarrhea one time today.       Subjective:    He is sleepy but arousable   Will answer questions       Review of systems:    General: No fevers or chills.  Cardiovascular: No chest pain or pressure. No palpitations.   Pulmonary: No shortness of breath.   Gastrointestinal: No nausea, vomiting.     Objective:    Vital signs/Intake and Output:  Visit Vitals  BP (!) 113/53   Pulse 80   Temp 98.3 °F (36.8 °C) (Oral)   Resp 18   Ht 1.676 m (5' 6\")   Wt 85 kg (187 lb 6.3 oz)   SpO2 91%   BMI 30.25 kg/m²     Current Shift:  No intake/output data recorded.  Last three shifts:  05/02 1901 - 05/04 0700  In: 500   Out: 1967     Physical Exam:  General: NAD, AAOx3. Non-toxic.  HEENT: NC/AT.  PERRLA, EOMI.  MMM.  Lungs: Nml inspection. CTA B/L. No wheezing, rales or rhonchi.   Heart:  S1S2 RRR,  PMI mid 5th IC space. No M/RG.  Abdomen: Soft, NT/ND.  BS+. No peritoneal signs.  Extremities: No C/C/E.  Psych:   Nml affect.  Neurologic:  2-12 intact.  Strength 5/5 
  Physician Progress Note      PATIENT:               SIERRA WINTERS  CSN #:                  [id number]  :                       1970  ADMIT DATE:       5/3/2024 2:09 PM  DISCH DATE:  RESPONDING  PROVIDER #:        Hellen Shook MD          QUERY TEXT:    Pt admitted with fluid overload, noncompliance dialysis, ESRD. Pt noted to   have altered mental status with negative CT of the head. If possible, please   document in the progress notes and discharge summary if you are evaluating and   / or treating any of the following:    The medical record reflects the following:  Risk Factors: noncompliance of renal dialysis, hypoxia, fluid overload, CHF    Clinical Indicators:  > Per PN 5/4- Altered mental status: head CT negative.  He is still sleepy. I wonder if some of this is uremia  > Per PN 5/5- Altered mental status: head CT negative.  He is much better today and back to normal    Treatment: receiving dialysis, O2 via nc    Lulú Oropeza RN, BSN, CRCR  [address] [facility] [place] [address]  [email or web address]  Options provided:  -- Encephalopathy due to (please specify), please specify.  -- Hypertensive encephalopathy  -- Metabolic encephalopathy  -- Delirium due to, Please specify cause.  -- Delirium  -- Other - I will add my own diagnosis  -- Disagree - Not applicable / Not valid  -- Disagree - Clinically unable to determine / Unknown  -- Refer to Clinical Documentation Reviewer    PROVIDER RESPONSE TEXT:    This patient has metabolic encephalopathy.    Query created by: Lulú Oropeza on 2024 12:50 PM      Electronically signed by:  Hellen Shook MD 2024 1:58 PM          
[id number] This nurse has assumed care of patient. Report received from SANIA Carmona. Patient stable and resting at this time.   
5/8/24 - CMS alerted Ivory/Puja at [facility] that pt had spoken to me about seeing a [facility] cardiologist.  He said that dialysis was supposed to refer him, but he had not heard anything about an appt.  Ivory said that they would f/u with pt.    
AVS reviewed with Pt, all questions answered. Medication education provided, Pt verbalized understanding. IV removed, no complications. Pt in room getting dressed, when ready nurse will call izzy.   
Assumed care. Patient awake and sitting up in bed. Assessment completed. Denies any current needs or concerns. Call light in reach. Patient states, \"I will be getting dialysis today.\" No further needs at this time.   
Called for increase trop   Will cont to trend and get cardio consult  
Cardiology Progress Note        Patient: Evan Huizar        Sex: male          DOA: 5/3/2024  YOB: 1970      Age:  53 y.o.        LOS:  LOS: 2 days   Assessment/Plan     Principal Problem:    Hypoxia  Active Problems:    Hx of BKA, right (HCC)    ESRD (end stage renal disease) on dialysis (HCC)    Hypertension    Chronic anemia    Volume overload    Diarrhea  Resolved Problems:    * No resolved hospital problems. *      Plan:    5/5/2024  Cardiac telemetry normal sinus rhythm  No chest pain or shortness of breath  Echocardiogram results pending  Continue with current medical treatment  No evidence of ACS  Discussed with patient.         Minimal troponin elevation without any evidence of ACS  Normal high-sensitivity troponin is 0-78, patient peaked at 128 and is already normalized to 70  Shortness of breath due to missed hemodialysis/noncompliance  History of mitral valve endocarditis without any follow-up, no history of overt heart failure   Continue hemodialysis  Continue regular C. difficile  Continue rest of the cardiac medication        Plan.  No evidence of ACS  Echocardiogram is pending  History of infective endocarditis more than 1 year ago without any follow-up  non compliance  Echocardiogram is pending          Subjective:    cc:  Shortness of breath        REVIEW OF SYSTEMS:     General: No fevers or chills.  Cardiovascular: No chest pain or pressure. No palpitations. No ankle swelling  Pulmonary: No SOB, orthopnea, PND  Gastrointestinal: No nausea, vomiting or diarrhea      Objective:      Visit Vitals  BP (!) 132/52   Pulse 86   Temp 99 °F (37.2 °C) (Oral)   Resp 18   Ht 1.676 m (5' 6\")   Wt 85 kg (187 lb 6.3 oz)   SpO2 94%   BMI 30.25 kg/m²     Body mass index is 30.25 kg/m².    Physical Exam:  General Appearance: Comfortable, not using accessory muscles of respiration.  NECK: No JVD, no thyroidomeglay.   LUNGS:  bilateral air entry positive   HEART: S1+S2 audible,    ABD: Non-tender, 
Patient in room 343 dialyzed for 3 hours.  Tolerated tx well with without complications.  RIJ CVC functioning without complication accessing or BFR after reversing lines.   mL/min   mL/min  3000 ml UF removed with a Net UF removal of 1467 ml.  Report given to Eduardo Montemayor RN with all questions answered.        Pre Dialysis:  Received report from Primary Nurse Eduardo Montemayor RN , in bed,lA & O X 4l. No s/s of distress noted on N/C at 2L/min. RIJ CVC assessed no s/s of infection noted. Gauze Dressing in place, no date noted. No s/s of infection present..     Intra Dialysis:  Time out /safety process performed per policy, Tx initiated at 1939. CVC flowing with ease. For hemodynamic stability UF goal set at 3000 ml as tolerated.     2035 Patient had a medium loose, watery, seedy tan stool. Primary Nurse made aware.    2130 Pt c/o feeling sick. UF turned off. Dr Vincent made aware. As per same patient will dialyze again 05/04/24.    Pt offered assistance with repositioning every 2 hours/prn. Vascular access visible and line connections remained intact throughout entire duration of treatment. Vital signs checked every 15 mins, WNL.     Post Dialysis:  Tx completed at 2241, Tolerated fair ,1.5 L removed. De-accessed per protocol.    RIJ TDC locked with Heparin 1.6 ml in arterial port, and 1.6 ml in venous port. Red Curos caps applied to lumen ends securely.catheter dressing changed. Same clean, dry and intact, dated 05/03/24. Patient remains in stable condition. Bed in lowest position, wheels locked. Call bell within reach.  
Patient stated that he was agreeable with DC today. Patients car at dialysis location, and will need transport to center via [employer]. [facility] referral placed; orders to follow.   
Physical Therapy Goals:  Initiated 5/4/2024 to be met within 7-10 days.  Short Term Goals  Short Term Goal 1: Patient will perform supine to/from sit with independence  Short Term Goal 2: Patient will perform sit to/from stand with supervision in prep for gait progression  Short Term Goal 3: Patient will ambulate 150 ft with LRAD and supervision to progress OOB mobility  PHYSICAL THERAPY EVALUATION    Patient: Evan Huizar (53 y.o. male)  Date: 5/4/2024  Diagnosis: Hypoxemia [R09.02]  Hypoxia [R09.02]  Nausea, vomiting and diarrhea [R11.2, R19.7]  ESRD on hemodialysis (HCC) [N18.6, Z99.2] Hypoxia    Precautions: Fall Risk  PLOF: Independent ambulation without AD    ASSESSMENT :  Patient received supine in bed. Pt presents with decreased LE strength, decreased endurance, decreased gait quailty, and impaired balance. Patient drowsy and did not provide attempts to improve alertness. Patient performed rolling and brief supine to long sit with supervision. Patient refused to participate further and continued attempts deferred at this time due to drowsiness. Patient appears below baseline level and would benefit from continued skilled PT to progress functional mobility. Recommend home health PT at discharge; pending progress with OOB mobility.    DEFICITS/IMPAIRMENTS:    , Body Structures, Functions, Activity Limitations Requiring Skilled Therapeutic Intervention: Decreased functional mobility ;Decreased strength;Decreased endurance    Patient will benefit from skilled intervention to address the above impairments.  Patient's rehabilitation potential/Therapy Prognosis: Good.  Factors which may influence rehabilitation potential include:   []         None noted  []         Mental ability/status  [x]         Medical condition  []         Home/family situation and support systems  []         Safety awareness  []         Pain tolerance/management  []         Other:      Recommendations and Planned Interventions: 
Pt refused PT due to:  []  Nausea/vomiting  [x]  Eating (Ist attempt)  []  Sleeping \"I'll do therapy tomorrow\" (2nd attempt)  []  Pt lethargic  []  Off Unit  Will f/u. Thank you.  Farooq England, PTA    
RENAL CONSULT progress note   2024    Patient:  Evan Huizar  :  1970  Gender:  male  MRN #:  [id number]    Reason for Consult: Hypoxia and ESRD related care     Subjective:   Feels much better, denied diarrhea and dyspnea   BP fluctuating overall stable   No other symptoms     Objective:    BP (!) 141/98   Pulse 83   Temp 98.9 °F (37.2 °C) (Oral)   Resp 19   Ht 1.676 m (5' 6\")   Wt 84.8 kg (187 lb)   SpO2 95%   BMI 30.18 kg/m²     Physical Exam:    Pt awake,  alert and comfortable  Lung: clear to auscultation  Ext: no edema     Laboratory Data:    Lab Results   Component Value Date    BUN 34 (H) 2024    BUN 22 (H) 2024    BUN 39 (H) 2024     2024     2024     2024    CO2 26 2024    CO2 29 2024    CO2 26 2024    GLUCOSE 79 2024    GLUCOSE 94 2024    GLUCOSE 89 2024     Lab Results   Component Value Date    WBC 5.6 2024    HGB 8.6 (L) 2024    HCT 26.6 (L) 2024     Lab Results   Component Value Date    CALCIUM 9.3 2024     Lab Results   Component Value Date    HDL 62 (H) 2022     No results found for: \"TURBIDITY\"    Imaging Reveiwed:    CXR: -     IMPRESSION:     Moderate CHF pattern of pulmonary edema. No significant change since March.      Assessment:  Evan Huizar is a 53 y.o. year old male ESRD on HD,  DM-2, anemia, uncontrolled hypertension , hyperphosphatemia non compliance to dialysis prescription, dietary/life style modification   He was transiently hypotensive during dialysis and was hypoxic   CXR showed- pulmonary edema      Pulmonary edema  Volume overload  Hypertension  Anemia  ESRD  Hyperphosphatemia       Plan:      Patient examined and available labs reviewed. Dialysis today for volume removal and clearance    Next HD on Monday         Hypertension: He has uncontrolled hypertension for many years due to non compliance. This time Is at target range due to 
RENAL CONSULT progress note   2024    Patient:  Evan Huizar  :  1970  Gender:  male  MRN #:  [id number]    Reason for Consult: Hypoxia and ESRD related care     Subjective:   denied diarrhea and dyspnea   Feels fine and wants to go home .   No other symptoms     Objective:    /78   Pulse 78   Temp 98.3 °F (36.8 °C) (Oral)   Resp 16   Ht 1.676 m (5' 6\")   Wt 84.8 kg (187 lb)   SpO2 100%   BMI 30.18 kg/m²     Physical Exam:    Pt awake,  alert and comfortable  Lung: clear to auscultation  Ext: no edema     Laboratory Data:    Lab Results   Component Value Date    BUN 34 (H) 2024    BUN 22 (H) 2024    BUN 39 (H) 2024     2024     2024     2024    CO2 26 2024    CO2 29 2024    CO2 26 2024    GLUCOSE 79 2024    GLUCOSE 94 2024    GLUCOSE 89 2024     Lab Results   Component Value Date    WBC 5.6 2024    HGB 8.6 (L) 2024    HCT 26.6 (L) 2024     Lab Results   Component Value Date    CALCIUM 9.3 2024     Lab Results   Component Value Date    HDL 62 (H) 2022     No results found for: \"TURBIDITY\"    Imaging Reveiwed:    CXR: -     IMPRESSION:     Moderate CHF pattern of pulmonary edema. No significant change since March.      Assessment:  Evan Huizar is a 53 y.o. year old male ESRD on HD,  DM-2, anemia, uncontrolled hypertension , hyperphosphatemia non compliance to dialysis prescription, dietary/life style modification   He was transiently hypotensive during dialysis and was hypoxic so was sent here , now is doing fine   CXR showed- pulmonary edema      Pulmonary edema  Volume overload  Hypertension  Anemia  ESRD  Hyperphosphatemia       Plan:      Patient examined and available labs reviewed. Dialysis today for volume removal and clearance    Next HD on Monday       Hypertension: He has uncontrolled hypertension for many years due to non compliance. This time Is at target 
RENAL CONSULT progress note   2024    Patient:  Evan Huizar  :  1970  Gender:  male  MRN #:  [id number]    Reason for Consult: Hypoxia and ESRD related care     Subjective:   Feels okay  Denied shortness of breath, diarrhea improving   BP fluctuating   No other symptoms     Objective:    BP (!) 147/71   Pulse 81   Temp 98 °F (36.7 °C) (Oral)   Resp 18   Ht 1.676 m (5' 6\")   Wt 85 kg (187 lb 6.3 oz)   SpO2 95%   BMI 30.25 kg/m²     Physical Exam:    Pt awake,  alert and comfortable  Lung: clear to auscultation  Ext: no edema   CNS- Oriented to time , place and person     Laboratory Data:    Lab Results   Component Value Date    BUN 22 (H) 2024    BUN 39 (H) 2024    BUN 75 (H) 2024     2024     2024     2024    CO2 29 2024    CO2 26 2024    CO2 22 2024    GLUCOSE 94 2024    GLUCOSE 89 2024    GLUCOSE 81 2024     Lab Results   Component Value Date    WBC 5.1 2024    HGB 8.8 (L) 2024    HCT 27.0 (L) 2024     Lab Results   Component Value Date    CALCIUM 8.8 2024     Lab Results   Component Value Date    HDL 62 (H) 2022     No results found for: \"TURBIDITY\"    Imaging Reveiwed:    CXR: -     IMPRESSION:     Moderate CHF pattern of pulmonary edema. No significant change since March.      Assessment:  Evan Huizar is a 53 y.o. year old male ESRD on HD,  DM-2, anemia, uncontrolled hypertension , hyperphosphatemia non compliance to dialysis prescription, dietary/life style modification   He was transiently hypotensive during dialysis and was hypoxic   CXR showed- pulmonary edema      Pulmonary edema  Volume overload  Hypertension  Anemia  ESRD  Hyperphosphatemia       Plan:      Patient examined and available labs reviewed. No clinical and metabolic indication of dialysis today    Next HD on Monday         Hypertension: He has uncontrolled hypertension for many years due to non 
WO CONTRAST    Result Date: 5/4/2024  No evidence of acute intracranial or intraorbital abnormality. Patchy chronic infarcts in the right SCA territory. Partially empty sella, nonspecific.     CTA CHEST W WO CONTRAST    Result Date: 5/3/2024  1. No pulmonary embolism demonstrated. 2. Small bilateral pleural effusions. 3. Moderate interstitial pulmonary edema. Diffuse anasarca appearance. 4. Pericholecystic fluid again shown. No biliary ductal dilation. 5. Colonic diverticulosis again noted. No bowel distention or localized mural thickening suggested. 6. Nondistended urinary bladder shows diffuse mural thickening which should be correlated clinically. 7. Retroperitoneal lymphadenopathy again shown. 8. Osseous sclerosis. Lumbar spondylosis.    CT ABDOMEN PELVIS W IV CONTRAST Additional Contrast? None    Result Date: 5/3/2024  1. No pulmonary embolism demonstrated. 2. Small bilateral pleural effusions. 3. Moderate interstitial pulmonary edema. Diffuse anasarca appearance. 4. Pericholecystic fluid again shown. No biliary ductal dilation. 5. Colonic diverticulosis again noted. No bowel distention or localized mural thickening suggested. 6. Nondistended urinary bladder shows diffuse mural thickening which should be correlated clinically. 7. Retroperitoneal lymphadenopathy again shown. 8. Osseous sclerosis. Lumbar spondylosis.          Cardiology Procedures:   No results found for this or any previous visit. 05/08/23    TRANSTHORACIC ECHOCARDIOGRAM (TTE) LIMITED (CONTRAST/BUBBLE/3D PRN) 05/09/2023  1:03 PM (Final)    Interpretation Summary    Left Ventricle: Normal left ventricular systolic function with a visually estimated EF of 55 - 60%. Left ventricle size is normal. Mildly increased wall thickness. Findings consistent with mild concentric hypertrophy. Normal wall motion. Grade II diastolic dysfunction present with normal LV EF.    Mitral Valve: Severe annular calcification at the posterior leaflet of the mitral valve. 
dominant   []  Left hand dominant    Cognitive/Behavioral Status:  Orientation Level: Oriented to place;Oriented to situation;Oriented to person  WFL                                   Vision/Perceptual:    Vision: Within Functional Limits                          Coordination: BUE  Coordination: Generally decreased, functional            Balance:     Balance  Sitting: Intact  Standing: Intact    Strength: BUE     Strength: Generally decreased, functional    Tone & Sensation: BUE                Range of Motion: BUE        AROM: Generally decreased, functional  PROM: Generally decreased, functional      Functional Mobility and Transfers for ADLs:  Bed Mobility:     Bed Mobility Training  Bed Mobility Training: Yes  Rolling: Supervision  Supine to Sit: Supervision  Scooting: Supervision  Transfers:                 Transfer Training  Transfer Training: Yes  Interventions: Safety awareness training;Verbal cues  Sit to Stand: Stand-by assistance  Stand to Sit: Stand-by assistance  Toilet Transfer: Contact-guard assistance    ADL Assessment:                  UE Dressing: Supervision  LE Dressing: Stand by assistance       ADL Intervention:    Therapeutic Exercise/Neuromuscular Re-education:    Pain:  Pain level pre-treatment: 0/10   Pain level post-treatment: 0/10       Activity Tolerance:   Activity Tolerance: Patient Tolerated treatment well  Please refer to the flowsheet for vital signs taken during this treatment.    After treatment:   [] Patient left in no apparent distress sitting up in chair  [x] Patient left in no apparent distress in bed  [x] Call bell left within reach  [x] Nursing notified  [] Caregiver present  [] Bed alarm activated    COMMUNICATION/EDUCATION:   Patient Education  Education Given To: Patient  Education Provided: Fall Prevention Strategies;Precautions;Plan of Care;ADL Adaptive Strategies;Transfer Training;Equipment;Energy Conservation;Role of Therapy  Education Method: 
evaluation of pulmonary embolism through the first segmental pulmonary arteries. There is no demonstration of pulmonary embolism. No pulmonary arterial enlargement is shown. Chest Wall: Diffuse anasarca appearance. Thyroid: No nodule. Mediastinum and angeline: No mass or adenopathy. Thoracic aorta: Atherosclerotic calcifications. No aneurysm. Heart: Mild cardiomegaly. Extensive coronary atherosclerotic calcifications. Esophagus: Mild distal mural thickening. Trachea, bronchi: No obstructing lesion. Pleura: Small bilateral pleural effusions. Lungs: Diffuse pulmonary groundglass opacities with reticular opacifications and thickening of secondary interlobular septae findings are most consistent with moderate interstitial pulmonary edema. No consolidative opacifications are shown. LIVER: No mass. BILIARY TREE: Pericholecystic fluid is again shown. Gallbladder distention is no longer noted. No intrahepatic or extrahepatic bile duct dilation is evident. SPLEEN: within normal limits. PANCREAS: No mass or ductal dilatation. ADRENALS: Unremarkable. KIDNEYS: 10 mm cyst again shown in the lower pole of the left kidney. Extensive atherosclerotic calcifications including within the renal parenchyma. STOMACH: Unremarkable. SMALL BOWEL: No dilatation or wall thickening. COLON: Descending and sigmoid diverticulosis again shown. APPENDIX: Unremarkable. PERITONEUM: No free intraperitoneal air or substantial peritoneal fluid. RETROPERITONEUM: Abundant atherosclerotic calcifications including extensively within the visceral arteries. Left para-aortic lymphadenopathy again shown with beulah short axis dimension measuring up to 10 mm. REPRODUCTIVE ORGANS: No obvious abnormality within the imaging field-of-view. URINARY BLADDER: The nondistended urinary bladder shows diffuse moderate mural thickening which should be correlated clinically. BONES: Diffuse osteosclerosis. Lumbar spondylosis with endplate irregularities.. ABDOMINAL WALL: Diffuse

## 2024-05-10 LAB
BACTERIA SPEC CULT: NORMAL
BACTERIA SPEC CULT: NORMAL
O+P SPEC MICRO: NORMAL
O+P STL CONC: NORMAL
SERVICE CMNT-IMP: NORMAL
SERVICE CMNT-IMP: NORMAL
SPECIMEN SOURCE: NORMAL

## 2024-06-19 ENCOUNTER — HOSPITAL ENCOUNTER (EMERGENCY)
Facility: HOSPITAL | Age: 54
Discharge: HOME | End: 2024-06-20
Attending: EMERGENCY MEDICINE
Payer: MEDICARE

## 2024-06-19 ENCOUNTER — APPOINTMENT (OUTPATIENT)
Facility: HOSPITAL | Age: 54
End: 2024-06-19
Payer: MEDICARE

## 2024-06-19 DIAGNOSIS — N19 UREMIA: ICD-10-CM

## 2024-06-19 DIAGNOSIS — Z99.2 ESRD NEEDING DIALYSIS (HCC): Primary | ICD-10-CM

## 2024-06-19 DIAGNOSIS — J81.0 ACUTE PULMONARY EDEMA (HCC): ICD-10-CM

## 2024-06-19 DIAGNOSIS — N18.6 ESRD NEEDING DIALYSIS (HCC): Primary | ICD-10-CM

## 2024-06-19 LAB
ALBUMIN SERPL-MCNC: 3.4 G/DL (ref 3.4–5)
ALBUMIN/GLOB SERPL: 0.7 (ref 0.8–1.7)
ALP SERPL-CCNC: 95 U/L (ref 45–117)
ALT SERPL-CCNC: 29 U/L (ref 16–61)
ANION GAP SERPL CALC-SCNC: 10 MMOL/L (ref 3–18)
AST SERPL-CCNC: 16 U/L (ref 10–38)
BASOPHILS # BLD: 0 K/UL (ref 0–0.1)
BASOPHILS NFR BLD: 0 % (ref 0–2)
BILIRUB SERPL-MCNC: 0.9 MG/DL (ref 0.2–1)
BUN SERPL-MCNC: 65 MG/DL (ref 7–18)
BUN/CREAT SERPL: 6 (ref 12–20)
CALCIUM SERPL-MCNC: 8.4 MG/DL (ref 8.5–10.1)
CHLORIDE SERPL-SCNC: 101 MMOL/L (ref 100–111)
CO2 SERPL-SCNC: 24 MMOL/L (ref 21–32)
CREAT SERPL-MCNC: 10.3 MG/DL (ref 0.6–1.3)
DIFFERENTIAL METHOD BLD: ABNORMAL
EOSINOPHIL # BLD: 0.2 K/UL (ref 0–0.4)
EOSINOPHIL NFR BLD: 4 % (ref 0–5)
ERYTHROCYTE [DISTWIDTH] IN BLOOD BY AUTOMATED COUNT: 18.6 % (ref 11.6–14.5)
GLOBULIN SER CALC-MCNC: 5.1 G/DL (ref 2–4)
GLUCOSE SERPL-MCNC: 76 MG/DL (ref 74–99)
HCT VFR BLD AUTO: 23.6 % (ref 36–48)
HGB BLD-MCNC: 8.1 G/DL (ref 13–16)
IMM GRANULOCYTES # BLD AUTO: 0 K/UL (ref 0–0.04)
IMM GRANULOCYTES NFR BLD AUTO: 0 % (ref 0–0.5)
LYMPHOCYTES # BLD: 0.5 K/UL (ref 0.9–3.6)
LYMPHOCYTES NFR BLD: 9 % (ref 21–52)
MAGNESIUM SERPL-MCNC: 2.6 MG/DL (ref 1.6–2.6)
MCH RBC QN AUTO: 26.6 PG (ref 24–34)
MCHC RBC AUTO-ENTMCNC: 34.3 G/DL (ref 31–37)
MCV RBC AUTO: 77.4 FL (ref 78–100)
MONOCYTES # BLD: 0.7 K/UL (ref 0.05–1.2)
MONOCYTES NFR BLD: 12 % (ref 3–10)
NEUTS SEG # BLD: 4.6 K/UL (ref 1.8–8)
NEUTS SEG NFR BLD: 76 % (ref 40–73)
NRBC # BLD: 0 K/UL (ref 0–0.01)
NRBC BLD-RTO: 0 PER 100 WBC
PLATELET # BLD AUTO: 169 K/UL (ref 135–420)
PMV BLD AUTO: 9.3 FL (ref 9.2–11.8)
POTASSIUM SERPL-SCNC: 3.5 MMOL/L (ref 3.5–5.5)
PROT SERPL-MCNC: 8.5 G/DL (ref 6.4–8.2)
RBC # BLD AUTO: 3.05 M/UL (ref 4.35–5.65)
SODIUM SERPL-SCNC: 135 MMOL/L (ref 136–145)
WBC # BLD AUTO: 6 K/UL (ref 4.6–13.2)

## 2024-06-19 PROCEDURE — 6360000002 HC RX W HCPCS: Performed by: INTERNAL MEDICINE

## 2024-06-19 PROCEDURE — 83735 ASSAY OF MAGNESIUM: CPT

## 2024-06-19 PROCEDURE — 6360000002 HC RX W HCPCS: Performed by: EMERGENCY MEDICINE

## 2024-06-19 PROCEDURE — 80053 COMPREHEN METABOLIC PANEL: CPT

## 2024-06-19 PROCEDURE — 85025 COMPLETE CBC W/AUTO DIFF WBC: CPT

## 2024-06-19 PROCEDURE — 96374 THER/PROPH/DIAG INJ IV PUSH: CPT

## 2024-06-19 PROCEDURE — 90935 HEMODIALYSIS ONE EVALUATION: CPT

## 2024-06-19 PROCEDURE — 71045 X-RAY EXAM CHEST 1 VIEW: CPT

## 2024-06-19 PROCEDURE — 99284 EMERGENCY DEPT VISIT MOD MDM: CPT

## 2024-06-19 RX ORDER — HEPARIN SODIUM 1000 [USP'U]/ML
2000 INJECTION, SOLUTION INTRAVENOUS; SUBCUTANEOUS ONCE
Status: COMPLETED | OUTPATIENT
Start: 2024-06-19 | End: 2024-06-19

## 2024-06-19 RX ORDER — HEPARIN SODIUM (PORCINE) LOCK FLUSH IV SOLN 100 UNIT/ML 100 UNIT/ML
2000 SOLUTION INTRAVENOUS PRN
Status: DISCONTINUED | OUTPATIENT
Start: 2024-06-19 | End: 2024-06-19

## 2024-06-19 RX ORDER — DIPHENHYDRAMINE HYDROCHLORIDE 50 MG/ML
25 INJECTION INTRAMUSCULAR; INTRAVENOUS
Status: COMPLETED | OUTPATIENT
Start: 2024-06-19 | End: 2024-06-19

## 2024-06-19 RX ADMIN — DIPHENHYDRAMINE HYDROCHLORIDE 25 MG: 50 INJECTION INTRAMUSCULAR; INTRAVENOUS at 18:54

## 2024-06-19 RX ADMIN — HEPARIN SODIUM 2000 UNITS: 1000 INJECTION INTRAVENOUS; SUBCUTANEOUS at 23:39

## 2024-06-19 NOTE — ED TRIAGE NOTES
Patient arrives via EMS for weakness and hypertension. Patient was at dialysis and received 2 hours of treatment before facility called 911.     Patient unable to sit still for BP reading. Able to answer questions appropriately.

## 2024-06-19 NOTE — ED PROVIDER NOTES
imminent or life threatening deterioration in the patients condition. This care involved high complexity decision making to assess, manipulate, and support vital system functions, to treat this degreee vital organ system failure and to prevent further life threatening deterioration of the patient’s condition.    Farooq Alas MD    Diagnosis and Disposition     DISPOSITION Decision To Discharge 06/20/2024 02:36:33 AM    DISCHARGE NOTE:  Evan Huizar's  results have been reviewed with him.  He has been counseled regarding his diagnosis, treatment, and plan.  He verbally conveys understanding and agreement of the signs, symptoms, diagnosis, treatment and prognosis and additionally agrees to follow up as discussed.  He also agrees with the care-plan and conveys that all of his questions have been answered.  I have also provided discharge instructions for him that include: educational information regarding their diagnosis and treatment, and list of reasons why they would want to return to the ED prior to their follow-up appointment, should his condition change. He has been provided with education for proper emergency department utilization.     CLINICAL IMPRESSION:  1. ESRD needing dialysis (HCC)    2. Acute pulmonary edema (HCC)    3. Uremia        PLAN:  D/C Home    DISCHARGE MEDICATIONS:  Discharge Medication List as of 6/20/2024  2:51 AM             Details   Glucagon, rDNA, (GLUCAGON EMERGENCY) 1 MG KIT As instructed, Disp-1 kit, R-0Print      levoFLOXacin (LEVAQUIN) 500 MG tablet Take 1 tablet by mouth every 48 hours, Disp-2 tablet, R-0Print      blood glucose monitor kit and supplies Dispense sufficient amount for indicated testing frequency plus additional to accommodate PRN testing needs. Dispense all needed supplies to include: monitor, strips, lancing device, lancets, control solutions, alcohol swabs., Disp-1 kit, R-0, Print      calcitRIOL (ROCALTROL) 0.5 MCG capsule Take 1 capsule by mouth daily,

## 2024-06-19 NOTE — ED NOTES
Client o2 sat at 94% and better when resting. Client restless and has consant movement that doesn't reflect accurate vitals. O2 sat 95% when made to minimize movement.

## 2024-06-20 VITALS
SYSTOLIC BLOOD PRESSURE: 186 MMHG | RESPIRATION RATE: 18 BRPM | TEMPERATURE: 98.6 F | HEART RATE: 90 BPM | DIASTOLIC BLOOD PRESSURE: 45 MMHG | OXYGEN SATURATION: 72 %

## 2024-06-20 NOTE — PROGRESS NOTES
Received pre HD report from KATHY Hidalgo RN.  Pt in bed, A+O x4, on room air, no s/s of distress noted.  RN accessed right IJ TDC per protocol.  Tx initiated at 2234.  TDC flowing with ease.  For hemodynamic stability UF goal 3500 ml.      2343: Pt on bedpan, large loose bowel movement  2355: Pt on bedpan, Primary RN notified for loose stool    Offered assistance with repositioning every 30 minutes.  Vascular access visible at all times during treatment, line connections intact at all times.  Tx completed at 0108, tolerated well 3L removed.  De-accessed per protocol.  Heparin indwell 1.6ml in arterial, and 1.6ml in venous catheter.  Unit nurse given report.

## 2024-06-20 NOTE — PROGRESS NOTES
2:46 AM : Pt care transferred to me from Dr. Alas  ,ED provider. History of patient complaint(s), available diagnostic reports and current treatment plan has been discussed thoroughly.   Bedside rounding on patient occured : No .  Intended disposition of patient : Home  Pending diagnostics reports and/or labs (please list):   ED Course as of 06/20/24 0246   Wed Jun 19, 2024   1840 CONSULT NOTE:   Farooq Alas MD spoke with Dr. Sales, pt with chronic noncompliance, itching is likely due to uremia but this will take several weeks of dialysis before it is improved.  Dr. Sales will see if they can finish his dialysis routinely tonight but if not he can follow-up for dialysis in clinic on Friday as planned.   [JM]   2040 CONSULT NOTE:   Farooq Alas MD spoke with Dr. Sales, they will arrange dialysis tonight.   [JM]   2320 TRANSFER OF CARE:  Patient care will be transferred from Farooq Alas MD to Andreas Burgess DO.  Discussed available diagnostic results and care plan. Patient/family made aware of provider change. All questions answered. [JM]   2335 Receiving dialyze currently then can dc home. Only received half of his session. [JM-2]      ED Course User Index  [JM] Farooq Alas MD  [JM-2] Andreas Burgess DO     Patient feels improved, resting comfortably after dialysis session.  Blood pressure also improved.  Will try to coordinate for transfer back to dialysis center where his car was left.  Has next dialysis session on Friday, June 21st.    Andreas Burgess DO  Emergency Physician  .S. Acute Care [facility]

## 2024-06-20 NOTE — DISCHARGE INSTRUCTIONS
Follow-up with your nephrologist and your primary care doctor.  Attend all of your dialysis sessions as scheduled.  Return to the ED for worsening symptoms or for other concerns.

## 2024-06-20 NOTE — PLAN OF CARE
Problem: Chronic Conditions and Co-morbidities  Goal: Patient's chronic conditions and co-morbidity symptoms are monitored and maintained or improved  Outcome: Progressing     INTERVENTION:  HEMODYNAMIC STABILIZATION  MAINTAIN BP WNL WHILE ON HD.     INTERVENTION:  FLUID MANAGEMENT  WILL ATTEMPT 3000 ML TOTAL FLUID REMOVAL AS TOLERATED.     INTERVENTION:  METABOLIC/ELECTROLYTE MANAGEMENT  2.0 POTASSIUM 2.5 CALCIUM DIALYSATE USED WITH HD TODAY.     INTERVENTION:  HEMODIALYSIS ACCESS SITE MANAGEMENT  RIGHT IJ TDC ACCESSED USING ASEPTIC TECHNIQUE.     GOAL:  SIGNS AND SYMPTOMS OF LISTED POTENTIAL PROBLEMS WILL BE ABSENT OR MANAGEABLE.     OUTCOME:  PROGRESSING.     HD PLANNED FOR 2.5 HOURS TODAY.

## 2024-09-06 ENCOUNTER — APPOINTMENT (OUTPATIENT)
Facility: HOSPITAL | Age: 54
DRG: 640 | End: 2024-09-06
Attending: HOSPITALIST
Payer: MEDICARE

## 2024-09-06 ENCOUNTER — APPOINTMENT (OUTPATIENT)
Facility: HOSPITAL | Age: 54
DRG: 640 | End: 2024-09-06
Payer: MEDICARE

## 2024-09-06 ENCOUNTER — HOSPITAL ENCOUNTER (INPATIENT)
Facility: HOSPITAL | Age: 54
LOS: 4 days | Discharge: HOME HEALTH | DRG: 640 | End: 2024-09-10
Attending: HOSPITALIST | Admitting: HOSPITALIST
Payer: MEDICARE

## 2024-09-06 DIAGNOSIS — M79.606 PAIN OF LOWER EXTREMITY, UNSPECIFIED LATERALITY: ICD-10-CM

## 2024-09-06 DIAGNOSIS — E87.79 OTHER HYPERVOLEMIA: ICD-10-CM

## 2024-09-06 DIAGNOSIS — N18.6 ESRD ON HEMODIALYSIS (HCC): ICD-10-CM

## 2024-09-06 DIAGNOSIS — R09.02 HYPOXEMIA: Primary | ICD-10-CM

## 2024-09-06 DIAGNOSIS — Z99.2 ESRD ON HEMODIALYSIS (HCC): ICD-10-CM

## 2024-09-06 DIAGNOSIS — M79.609 AMPUTATION STUMP PAIN (HCC): ICD-10-CM

## 2024-09-06 DIAGNOSIS — T87.89 AMPUTATION STUMP PAIN (HCC): ICD-10-CM

## 2024-09-06 PROBLEM — I16.0 HYPERTENSIVE URGENCY: Status: ACTIVE | Noted: 2024-09-06

## 2024-09-06 PROBLEM — R94.31 PROLONGED Q-T INTERVAL ON ECG: Status: ACTIVE | Noted: 2024-09-06

## 2024-09-06 LAB
ANION GAP SERPL CALC-SCNC: 12 MMOL/L (ref 3–18)
BASOPHILS # BLD: 0 K/UL (ref 0–0.1)
BASOPHILS NFR BLD: 0 % (ref 0–2)
BUN SERPL-MCNC: 72 MG/DL (ref 7–18)
BUN/CREAT SERPL: 5 (ref 12–20)
CALCIUM SERPL-MCNC: 8.3 MG/DL (ref 8.5–10.1)
CHLORIDE SERPL-SCNC: 104 MMOL/L (ref 100–111)
CO2 SERPL-SCNC: 22 MMOL/L (ref 21–32)
CREAT SERPL-MCNC: 13.6 MG/DL (ref 0.6–1.3)
DIFFERENTIAL METHOD BLD: ABNORMAL
ECHO BSA: 1.95 M2
EOSINOPHIL # BLD: 0.3 K/UL (ref 0–0.4)
EOSINOPHIL NFR BLD: 6 % (ref 0–5)
ERYTHROCYTE [DISTWIDTH] IN BLOOD BY AUTOMATED COUNT: 20.3 % (ref 11.6–14.5)
EST. AVERAGE GLUCOSE BLD GHB EST-MCNC: 88 MG/DL
GLUCOSE BLD STRIP.AUTO-MCNC: 194 MG/DL (ref 70–110)
GLUCOSE BLD STRIP.AUTO-MCNC: 71 MG/DL (ref 70–110)
GLUCOSE BLD STRIP.AUTO-MCNC: 74 MG/DL (ref 70–110)
GLUCOSE SERPL-MCNC: 76 MG/DL (ref 74–99)
HBA1C MFR BLD: 4.7 % (ref 4.2–5.6)
HCT VFR BLD AUTO: 29.8 % (ref 36–48)
HGB BLD-MCNC: 10 G/DL (ref 13–16)
IMM GRANULOCYTES # BLD AUTO: 0.1 K/UL (ref 0–0.04)
IMM GRANULOCYTES NFR BLD AUTO: 1 % (ref 0–0.5)
LYMPHOCYTES # BLD: 0.4 K/UL (ref 0.9–3.6)
LYMPHOCYTES NFR BLD: 7 % (ref 21–52)
MCH RBC QN AUTO: 25.8 PG (ref 24–34)
MCHC RBC AUTO-ENTMCNC: 33.6 G/DL (ref 31–37)
MCV RBC AUTO: 76.8 FL (ref 78–100)
MONOCYTES # BLD: 0.5 K/UL (ref 0.05–1.2)
MONOCYTES NFR BLD: 9 % (ref 3–10)
NEUTS SEG # BLD: 4.3 K/UL (ref 1.8–8)
NEUTS SEG NFR BLD: 77 % (ref 40–73)
NRBC # BLD: 0 K/UL (ref 0–0.01)
NRBC BLD-RTO: 0 PER 100 WBC
PLATELET # BLD AUTO: 148 K/UL (ref 135–420)
PLATELET COMMENT: ABNORMAL
POTASSIUM SERPL-SCNC: 4.4 MMOL/L (ref 3.5–5.5)
RBC # BLD AUTO: 3.88 M/UL (ref 4.35–5.65)
RBC MORPH BLD: ABNORMAL
SODIUM SERPL-SCNC: 138 MMOL/L (ref 136–145)
WBC # BLD AUTO: 5.6 K/UL (ref 4.6–13.2)

## 2024-09-06 PROCEDURE — 6370000000 HC RX 637 (ALT 250 FOR IP): Performed by: INTERNAL MEDICINE

## 2024-09-06 PROCEDURE — 85025 COMPLETE CBC W/AUTO DIFF WBC: CPT

## 2024-09-06 PROCEDURE — 70450 CT HEAD/BRAIN W/O DYE: CPT

## 2024-09-06 PROCEDURE — 6360000004 HC RX CONTRAST MEDICATION: Performed by: PHYSICIAN ASSISTANT

## 2024-09-06 PROCEDURE — 2000000000 HC ICU R&B

## 2024-09-06 PROCEDURE — 73700 CT LOWER EXTREMITY W/O DYE: CPT

## 2024-09-06 PROCEDURE — 80048 BASIC METABOLIC PNL TOTAL CA: CPT

## 2024-09-06 PROCEDURE — 5A1D70Z PERFORMANCE OF URINARY FILTRATION, INTERMITTENT, LESS THAN 6 HOURS PER DAY: ICD-10-PCS | Performed by: STUDENT IN AN ORGANIZED HEALTH CARE EDUCATION/TRAINING PROGRAM

## 2024-09-06 PROCEDURE — 83036 HEMOGLOBIN GLYCOSYLATED A1C: CPT

## 2024-09-06 PROCEDURE — 71045 X-RAY EXAM CHEST 1 VIEW: CPT

## 2024-09-06 PROCEDURE — 6370000000 HC RX 637 (ALT 250 FOR IP): Performed by: HOSPITALIST

## 2024-09-06 PROCEDURE — 96374 THER/PROPH/DIAG INJ IV PUSH: CPT

## 2024-09-06 PROCEDURE — 73590 X-RAY EXAM OF LOWER LEG: CPT

## 2024-09-06 PROCEDURE — 6370000000 HC RX 637 (ALT 250 FOR IP): Performed by: STUDENT IN AN ORGANIZED HEALTH CARE EDUCATION/TRAINING PROGRAM

## 2024-09-06 PROCEDURE — 71275 CT ANGIOGRAPHY CHEST: CPT

## 2024-09-06 PROCEDURE — 6360000002 HC RX W HCPCS: Performed by: HOSPITALIST

## 2024-09-06 PROCEDURE — 82962 GLUCOSE BLOOD TEST: CPT

## 2024-09-06 PROCEDURE — 93970 EXTREMITY STUDY: CPT

## 2024-09-06 PROCEDURE — 99285 EMERGENCY DEPT VISIT HI MDM: CPT

## 2024-09-06 PROCEDURE — 36556 INSERT NON-TUNNEL CV CATH: CPT

## 2024-09-06 PROCEDURE — 90935 HEMODIALYSIS ONE EVALUATION: CPT

## 2024-09-06 PROCEDURE — 6360000002 HC RX W HCPCS: Performed by: PHYSICIAN ASSISTANT

## 2024-09-06 RX ORDER — ACETAMINOPHEN 325 MG/1
650 TABLET ORAL EVERY 6 HOURS PRN
Status: DISCONTINUED | OUTPATIENT
Start: 2024-09-06 | End: 2024-09-10 | Stop reason: HOSPADM

## 2024-09-06 RX ORDER — GLUCAGON 1 MG/ML
1 KIT INJECTION PRN
Status: DISCONTINUED | OUTPATIENT
Start: 2024-09-06 | End: 2024-09-10 | Stop reason: HOSPADM

## 2024-09-06 RX ORDER — ONDANSETRON 2 MG/ML
4 INJECTION INTRAMUSCULAR; INTRAVENOUS EVERY 6 HOURS PRN
Status: DISCONTINUED | OUTPATIENT
Start: 2024-09-06 | End: 2024-09-10 | Stop reason: HOSPADM

## 2024-09-06 RX ORDER — CALCITRIOL 0.25 UG/1
0.5 CAPSULE, LIQUID FILLED ORAL DAILY
Status: DISCONTINUED | OUTPATIENT
Start: 2024-09-06 | End: 2024-09-10 | Stop reason: HOSPADM

## 2024-09-06 RX ORDER — HYDRALAZINE HYDROCHLORIDE 20 MG/ML
10 INJECTION INTRAMUSCULAR; INTRAVENOUS
Status: COMPLETED | OUTPATIENT
Start: 2024-09-06 | End: 2024-09-06

## 2024-09-06 RX ORDER — ACETAMINOPHEN 650 MG/1
650 SUPPOSITORY RECTAL EVERY 6 HOURS PRN
Status: DISCONTINUED | OUTPATIENT
Start: 2024-09-06 | End: 2024-09-10 | Stop reason: HOSPADM

## 2024-09-06 RX ORDER — INSULIN LISPRO 100 [IU]/ML
0-4 INJECTION, SOLUTION INTRAVENOUS; SUBCUTANEOUS NIGHTLY
Status: DISCONTINUED | OUTPATIENT
Start: 2024-09-06 | End: 2024-09-10 | Stop reason: HOSPADM

## 2024-09-06 RX ORDER — AMLODIPINE BESYLATE 5 MG/1
10 TABLET ORAL DAILY
Status: DISCONTINUED | OUTPATIENT
Start: 2024-09-06 | End: 2024-09-07

## 2024-09-06 RX ORDER — IOPAMIDOL 755 MG/ML
100 INJECTION, SOLUTION INTRAVASCULAR
Status: COMPLETED | OUTPATIENT
Start: 2024-09-06 | End: 2024-09-06

## 2024-09-06 RX ORDER — SEVELAMER CARBONATE 800 MG/1
800 TABLET, FILM COATED ORAL
Status: DISCONTINUED | OUTPATIENT
Start: 2024-09-06 | End: 2024-09-10 | Stop reason: HOSPADM

## 2024-09-06 RX ORDER — HYDRALAZINE HYDROCHLORIDE 20 MG/ML
10 INJECTION INTRAMUSCULAR; INTRAVENOUS EVERY 6 HOURS PRN
Status: DISCONTINUED | OUTPATIENT
Start: 2024-09-06 | End: 2024-09-10 | Stop reason: HOSPADM

## 2024-09-06 RX ORDER — CINACALCET 30 MG/1
90 TABLET, FILM COATED ORAL DAILY
Status: DISCONTINUED | OUTPATIENT
Start: 2024-09-06 | End: 2024-09-10 | Stop reason: HOSPADM

## 2024-09-06 RX ORDER — SODIUM CHLORIDE 9 MG/ML
INJECTION, SOLUTION INTRAVENOUS PRN
Status: DISCONTINUED | OUTPATIENT
Start: 2024-09-06 | End: 2024-09-10 | Stop reason: HOSPADM

## 2024-09-06 RX ORDER — POLYETHYLENE GLYCOL 3350 17 G/17G
17 POWDER, FOR SOLUTION ORAL DAILY PRN
Status: DISCONTINUED | OUTPATIENT
Start: 2024-09-06 | End: 2024-09-10 | Stop reason: HOSPADM

## 2024-09-06 RX ORDER — INSULIN LISPRO 100 [IU]/ML
0-8 INJECTION, SOLUTION INTRAVENOUS; SUBCUTANEOUS
Status: DISCONTINUED | OUTPATIENT
Start: 2024-09-06 | End: 2024-09-10 | Stop reason: HOSPADM

## 2024-09-06 RX ORDER — ONDANSETRON 4 MG/1
4 TABLET, ORALLY DISINTEGRATING ORAL EVERY 8 HOURS PRN
Status: DISCONTINUED | OUTPATIENT
Start: 2024-09-06 | End: 2024-09-10 | Stop reason: HOSPADM

## 2024-09-06 RX ORDER — DEXTROSE MONOHYDRATE 100 MG/ML
INJECTION, SOLUTION INTRAVENOUS CONTINUOUS PRN
Status: DISCONTINUED | OUTPATIENT
Start: 2024-09-06 | End: 2024-09-10 | Stop reason: HOSPADM

## 2024-09-06 RX ORDER — SODIUM CHLORIDE 0.9 % (FLUSH) 0.9 %
5-40 SYRINGE (ML) INJECTION PRN
Status: DISCONTINUED | OUTPATIENT
Start: 2024-09-06 | End: 2024-09-10 | Stop reason: HOSPADM

## 2024-09-06 RX ORDER — PANTOPRAZOLE SODIUM 40 MG/1
40 TABLET, DELAYED RELEASE ORAL
Status: DISCONTINUED | OUTPATIENT
Start: 2024-09-07 | End: 2024-09-10 | Stop reason: HOSPADM

## 2024-09-06 RX ORDER — SODIUM CHLORIDE 0.9 % (FLUSH) 0.9 %
5-40 SYRINGE (ML) INJECTION EVERY 12 HOURS SCHEDULED
Status: DISCONTINUED | OUTPATIENT
Start: 2024-09-06 | End: 2024-09-10 | Stop reason: HOSPADM

## 2024-09-06 RX ORDER — HYDRALAZINE HYDROCHLORIDE 50 MG/1
50 TABLET, FILM COATED ORAL EVERY 8 HOURS SCHEDULED
Status: DISCONTINUED | OUTPATIENT
Start: 2024-09-06 | End: 2024-09-08

## 2024-09-06 RX ORDER — HEPARIN SODIUM 5000 [USP'U]/ML
5000 INJECTION, SOLUTION INTRAVENOUS; SUBCUTANEOUS EVERY 8 HOURS SCHEDULED
Status: DISCONTINUED | OUTPATIENT
Start: 2024-09-06 | End: 2024-09-10 | Stop reason: HOSPADM

## 2024-09-06 RX ORDER — LABETALOL 200 MG/1
200 TABLET, FILM COATED ORAL 2 TIMES DAILY
Status: DISCONTINUED | OUTPATIENT
Start: 2024-09-06 | End: 2024-09-10 | Stop reason: HOSPADM

## 2024-09-06 RX ADMIN — IOPAMIDOL 100 ML: 755 INJECTION, SOLUTION INTRAVENOUS at 15:45

## 2024-09-06 RX ADMIN — ACETAMINOPHEN 650 MG: 325 TABLET ORAL at 16:45

## 2024-09-06 RX ADMIN — SEVELAMER CARBONATE 800 MG: 800 TABLET, FILM COATED ORAL at 22:18

## 2024-09-06 RX ADMIN — HYDRALAZINE HYDROCHLORIDE 10 MG: 20 INJECTION INTRAMUSCULAR; INTRAVENOUS at 14:50

## 2024-09-06 RX ADMIN — LABETALOL HYDROCHLORIDE 200 MG: 200 TABLET, FILM COATED ORAL at 22:18

## 2024-09-06 RX ADMIN — CINACALCET HYDROCHLORIDE 90 MG: 30 TABLET, FILM COATED ORAL at 22:15

## 2024-09-06 RX ADMIN — HYDRALAZINE HYDROCHLORIDE 10 MG: 20 INJECTION INTRAMUSCULAR; INTRAVENOUS at 19:21

## 2024-09-06 RX ADMIN — HYDRALAZINE HYDROCHLORIDE 10 MG: 20 INJECTION, SOLUTION INTRAMUSCULAR; INTRAVENOUS at 14:25

## 2024-09-06 RX ADMIN — CALCITRIOL CAPSULES 0.25 MCG 0.5 MCG: 0.25 CAPSULE ORAL at 16:17

## 2024-09-06 RX ADMIN — ACETAMINOPHEN 650 MG: 325 TABLET ORAL at 22:18

## 2024-09-06 RX ADMIN — HYDRALAZINE HYDROCHLORIDE 50 MG: 50 TABLET ORAL at 22:19

## 2024-09-06 ASSESSMENT — PAIN SCALES - GENERAL
PAINLEVEL_OUTOF10: 5
PAINLEVEL_OUTOF10: 4
PAINLEVEL_OUTOF10: 4

## 2024-09-06 ASSESSMENT — PAIN DESCRIPTION - ORIENTATION
ORIENTATION: RIGHT
ORIENTATION: RIGHT

## 2024-09-06 ASSESSMENT — PAIN DESCRIPTION - PAIN TYPE: TYPE: ACUTE PAIN

## 2024-09-06 ASSESSMENT — PAIN DESCRIPTION - LOCATION
LOCATION: LEG
LOCATION: OTHER (COMMENT)

## 2024-09-06 ASSESSMENT — PAIN - FUNCTIONAL ASSESSMENT: PAIN_FUNCTIONAL_ASSESSMENT: 0-10

## 2024-09-07 LAB
ANION GAP SERPL CALC-SCNC: 8 MMOL/L (ref 3–18)
BASOPHILS # BLD: 0 K/UL (ref 0–0.1)
BASOPHILS NFR BLD: 1 % (ref 0–2)
BUN SERPL-MCNC: 43 MG/DL (ref 7–18)
BUN/CREAT SERPL: 4 (ref 12–20)
CALCIUM SERPL-MCNC: 8.2 MG/DL (ref 8.5–10.1)
CHLORIDE SERPL-SCNC: 103 MMOL/L (ref 100–111)
CO2 SERPL-SCNC: 26 MMOL/L (ref 21–32)
CREAT SERPL-MCNC: 9.65 MG/DL (ref 0.6–1.3)
DIFFERENTIAL METHOD BLD: ABNORMAL
EOSINOPHIL # BLD: 0.4 K/UL (ref 0–0.4)
EOSINOPHIL NFR BLD: 7 % (ref 0–5)
ERYTHROCYTE [DISTWIDTH] IN BLOOD BY AUTOMATED COUNT: 20.3 % (ref 11.6–14.5)
GLUCOSE BLD STRIP.AUTO-MCNC: 113 MG/DL (ref 70–110)
GLUCOSE BLD STRIP.AUTO-MCNC: 125 MG/DL (ref 70–110)
GLUCOSE BLD STRIP.AUTO-MCNC: 88 MG/DL (ref 70–110)
GLUCOSE BLD STRIP.AUTO-MCNC: 95 MG/DL (ref 70–110)
GLUCOSE SERPL-MCNC: 89 MG/DL (ref 74–99)
HCT VFR BLD AUTO: 30.2 % (ref 36–48)
HGB BLD-MCNC: 10.4 G/DL (ref 13–16)
IMM GRANULOCYTES # BLD AUTO: 0 K/UL (ref 0–0.04)
IMM GRANULOCYTES NFR BLD AUTO: 0 % (ref 0–0.5)
LYMPHOCYTES # BLD: 0.4 K/UL (ref 0.9–3.6)
LYMPHOCYTES NFR BLD: 7 % (ref 21–52)
MCH RBC QN AUTO: 25.9 PG (ref 24–34)
MCHC RBC AUTO-ENTMCNC: 34.4 G/DL (ref 31–37)
MCV RBC AUTO: 75.3 FL (ref 78–100)
MONOCYTES # BLD: 0.7 K/UL (ref 0.05–1.2)
MONOCYTES NFR BLD: 11 % (ref 3–10)
NEUTS SEG # BLD: 4.6 K/UL (ref 1.8–8)
NEUTS SEG NFR BLD: 75 % (ref 40–73)
NRBC # BLD: 0 K/UL (ref 0–0.01)
NRBC BLD-RTO: 0 PER 100 WBC
PLATELET # BLD AUTO: 142 K/UL (ref 135–420)
POTASSIUM SERPL-SCNC: 3.9 MMOL/L (ref 3.5–5.5)
RBC # BLD AUTO: 4.01 M/UL (ref 4.35–5.65)
SODIUM SERPL-SCNC: 137 MMOL/L (ref 136–145)
WBC # BLD AUTO: 6.1 K/UL (ref 4.6–13.2)

## 2024-09-07 PROCEDURE — 85025 COMPLETE CBC W/AUTO DIFF WBC: CPT

## 2024-09-07 PROCEDURE — 6370000000 HC RX 637 (ALT 250 FOR IP): Performed by: INTERNAL MEDICINE

## 2024-09-07 PROCEDURE — 1100000003 HC PRIVATE W/ TELEMETRY

## 2024-09-07 PROCEDURE — 6370000000 HC RX 637 (ALT 250 FOR IP): Performed by: HOSPITALIST

## 2024-09-07 PROCEDURE — 2700000000 HC OXYGEN THERAPY PER DAY

## 2024-09-07 PROCEDURE — 2500000003 HC RX 250 WO HCPCS: Performed by: HOSPITALIST

## 2024-09-07 PROCEDURE — 2580000003 HC RX 258: Performed by: HOSPITALIST

## 2024-09-07 PROCEDURE — 82962 GLUCOSE BLOOD TEST: CPT

## 2024-09-07 PROCEDURE — 6360000002 HC RX W HCPCS: Performed by: HOSPITALIST

## 2024-09-07 PROCEDURE — 36415 COLL VENOUS BLD VENIPUNCTURE: CPT

## 2024-09-07 PROCEDURE — 80048 BASIC METABOLIC PNL TOTAL CA: CPT

## 2024-09-07 PROCEDURE — 6370000000 HC RX 637 (ALT 250 FOR IP): Performed by: STUDENT IN AN ORGANIZED HEALTH CARE EDUCATION/TRAINING PROGRAM

## 2024-09-07 RX ORDER — AMLODIPINE BESYLATE 5 MG/1
5 TABLET ORAL DAILY
Status: DISCONTINUED | OUTPATIENT
Start: 2024-09-07 | End: 2024-09-08

## 2024-09-07 RX ORDER — AMLODIPINE BESYLATE 5 MG/1
5 TABLET ORAL DAILY
Status: DISCONTINUED | OUTPATIENT
Start: 2024-09-08 | End: 2024-09-07

## 2024-09-07 RX ADMIN — HEPARIN SODIUM 5000 UNITS: 5000 INJECTION INTRAVENOUS; SUBCUTANEOUS at 21:35

## 2024-09-07 RX ADMIN — DOXYCYCLINE 100 MG: 100 INJECTION, POWDER, LYOPHILIZED, FOR SOLUTION INTRAVENOUS at 12:07

## 2024-09-07 RX ADMIN — HEPARIN SODIUM 5000 UNITS: 5000 INJECTION INTRAVENOUS; SUBCUTANEOUS at 12:49

## 2024-09-07 RX ADMIN — CALCITRIOL CAPSULES 0.25 MCG 0.5 MCG: 0.25 CAPSULE ORAL at 07:56

## 2024-09-07 RX ADMIN — LABETALOL HYDROCHLORIDE 200 MG: 200 TABLET, FILM COATED ORAL at 21:36

## 2024-09-07 RX ADMIN — ONDANSETRON 4 MG: 2 INJECTION INTRAMUSCULAR; INTRAVENOUS at 10:10

## 2024-09-07 RX ADMIN — HYDRALAZINE HYDROCHLORIDE 50 MG: 50 TABLET ORAL at 06:09

## 2024-09-07 RX ADMIN — HEPARIN SODIUM 5000 UNITS: 5000 INJECTION INTRAVENOUS; SUBCUTANEOUS at 06:10

## 2024-09-07 RX ADMIN — HYDRALAZINE HYDROCHLORIDE 50 MG: 50 TABLET ORAL at 12:50

## 2024-09-07 RX ADMIN — SEVELAMER CARBONATE 800 MG: 800 TABLET, FILM COATED ORAL at 07:56

## 2024-09-07 RX ADMIN — PANTOPRAZOLE SODIUM 40 MG: 40 TABLET, DELAYED RELEASE ORAL at 06:09

## 2024-09-07 RX ADMIN — HYDRALAZINE HYDROCHLORIDE 50 MG: 50 TABLET ORAL at 21:36

## 2024-09-07 RX ADMIN — LABETALOL HYDROCHLORIDE 200 MG: 200 TABLET, FILM COATED ORAL at 07:56

## 2024-09-07 RX ADMIN — SEVELAMER CARBONATE 800 MG: 800 TABLET, FILM COATED ORAL at 11:36

## 2024-09-07 RX ADMIN — SEVELAMER CARBONATE 800 MG: 800 TABLET, FILM COATED ORAL at 18:13

## 2024-09-07 RX ADMIN — SODIUM CHLORIDE, PRESERVATIVE FREE 10 ML: 5 INJECTION INTRAVENOUS at 08:00

## 2024-09-07 RX ADMIN — CINACALCET HYDROCHLORIDE 90 MG: 30 TABLET, FILM COATED ORAL at 07:56

## 2024-09-07 RX ADMIN — SODIUM CHLORIDE, PRESERVATIVE FREE 10 ML: 5 INJECTION INTRAVENOUS at 21:36

## 2024-09-07 RX ADMIN — DOXYCYCLINE 100 MG: 100 INJECTION, POWDER, LYOPHILIZED, FOR SOLUTION INTRAVENOUS at 22:47

## 2024-09-07 RX ADMIN — AMLODIPINE BESYLATE 5 MG: 5 TABLET ORAL at 12:50

## 2024-09-07 RX ADMIN — DOXYCYCLINE 100 MG: 100 INJECTION, POWDER, LYOPHILIZED, FOR SOLUTION INTRAVENOUS at 00:35

## 2024-09-07 ASSESSMENT — PAIN SCALES - GENERAL
PAINLEVEL_OUTOF10: 5
PAINLEVEL_OUTOF10: 0

## 2024-09-08 LAB
ANION GAP SERPL CALC-SCNC: 8 MMOL/L (ref 3–18)
BASOPHILS # BLD: 0 K/UL (ref 0–0.1)
BASOPHILS NFR BLD: 0 % (ref 0–2)
BUN SERPL-MCNC: 50 MG/DL (ref 7–18)
BUN/CREAT SERPL: 5 (ref 12–20)
CALCIUM SERPL-MCNC: 7.8 MG/DL (ref 8.5–10.1)
CHLORIDE SERPL-SCNC: 102 MMOL/L (ref 100–111)
CO2 SERPL-SCNC: 25 MMOL/L (ref 21–32)
CREAT SERPL-MCNC: 10.4 MG/DL (ref 0.6–1.3)
DIFFERENTIAL METHOD BLD: ABNORMAL
EOSINOPHIL # BLD: 0.3 K/UL (ref 0–0.4)
EOSINOPHIL NFR BLD: 6 % (ref 0–5)
ERYTHROCYTE [DISTWIDTH] IN BLOOD BY AUTOMATED COUNT: 20.2 % (ref 11.6–14.5)
GLUCOSE BLD STRIP.AUTO-MCNC: 111 MG/DL (ref 70–110)
GLUCOSE BLD STRIP.AUTO-MCNC: 114 MG/DL (ref 70–110)
GLUCOSE BLD STRIP.AUTO-MCNC: 81 MG/DL (ref 70–110)
GLUCOSE BLD STRIP.AUTO-MCNC: 94 MG/DL (ref 70–110)
GLUCOSE SERPL-MCNC: 90 MG/DL (ref 74–99)
HCT VFR BLD AUTO: 28.9 % (ref 36–48)
HGB BLD-MCNC: 9.7 G/DL (ref 13–16)
IMM GRANULOCYTES # BLD AUTO: 0 K/UL (ref 0–0.04)
IMM GRANULOCYTES NFR BLD AUTO: 0 % (ref 0–0.5)
LYMPHOCYTES # BLD: 0.5 K/UL (ref 0.9–3.6)
LYMPHOCYTES NFR BLD: 9 % (ref 21–52)
MCH RBC QN AUTO: 25.7 PG (ref 24–34)
MCHC RBC AUTO-ENTMCNC: 33.6 G/DL (ref 31–37)
MCV RBC AUTO: 76.5 FL (ref 78–100)
MONOCYTES # BLD: 0.5 K/UL (ref 0.05–1.2)
MONOCYTES NFR BLD: 10 % (ref 3–10)
NEUTS SEG # BLD: 3.8 K/UL (ref 1.8–8)
NEUTS SEG NFR BLD: 75 % (ref 40–73)
NRBC # BLD: 0 K/UL (ref 0–0.01)
NRBC BLD-RTO: 0 PER 100 WBC
PLATELET # BLD AUTO: 149 K/UL (ref 135–420)
POTASSIUM SERPL-SCNC: 4.4 MMOL/L (ref 3.5–5.5)
RBC # BLD AUTO: 3.78 M/UL (ref 4.35–5.65)
RBC MORPH BLD: ABNORMAL
SODIUM SERPL-SCNC: 135 MMOL/L (ref 136–145)
WBC # BLD AUTO: 5.1 K/UL (ref 4.6–13.2)

## 2024-09-08 PROCEDURE — 90935 HEMODIALYSIS ONE EVALUATION: CPT

## 2024-09-08 PROCEDURE — 82962 GLUCOSE BLOOD TEST: CPT

## 2024-09-08 PROCEDURE — 85025 COMPLETE CBC W/AUTO DIFF WBC: CPT

## 2024-09-08 PROCEDURE — 6360000002 HC RX W HCPCS: Performed by: STUDENT IN AN ORGANIZED HEALTH CARE EDUCATION/TRAINING PROGRAM

## 2024-09-08 PROCEDURE — 6370000000 HC RX 637 (ALT 250 FOR IP): Performed by: HOSPITALIST

## 2024-09-08 PROCEDURE — 6370000000 HC RX 637 (ALT 250 FOR IP): Performed by: INTERNAL MEDICINE

## 2024-09-08 PROCEDURE — 2580000003 HC RX 258: Performed by: HOSPITALIST

## 2024-09-08 PROCEDURE — 6360000002 HC RX W HCPCS: Performed by: HOSPITALIST

## 2024-09-08 PROCEDURE — 1100000003 HC PRIVATE W/ TELEMETRY

## 2024-09-08 PROCEDURE — 2500000003 HC RX 250 WO HCPCS: Performed by: HOSPITALIST

## 2024-09-08 PROCEDURE — 6370000000 HC RX 637 (ALT 250 FOR IP): Performed by: STUDENT IN AN ORGANIZED HEALTH CARE EDUCATION/TRAINING PROGRAM

## 2024-09-08 PROCEDURE — 80048 BASIC METABOLIC PNL TOTAL CA: CPT

## 2024-09-08 RX ORDER — AMLODIPINE BESYLATE 5 MG/1
10 TABLET ORAL DAILY
Status: DISCONTINUED | OUTPATIENT
Start: 2024-09-09 | End: 2024-09-10 | Stop reason: HOSPADM

## 2024-09-08 RX ORDER — HEPARIN SODIUM 1000 [USP'U]/ML
3200 INJECTION, SOLUTION INTRAVENOUS; SUBCUTANEOUS PRN
Status: DISCONTINUED | OUTPATIENT
Start: 2024-09-08 | End: 2024-09-10 | Stop reason: HOSPADM

## 2024-09-08 RX ORDER — HYDRALAZINE HYDROCHLORIDE 50 MG/1
100 TABLET, FILM COATED ORAL EVERY 8 HOURS SCHEDULED
Status: DISCONTINUED | OUTPATIENT
Start: 2024-09-08 | End: 2024-09-10 | Stop reason: HOSPADM

## 2024-09-08 RX ADMIN — HYDRALAZINE HYDROCHLORIDE 100 MG: 50 TABLET ORAL at 22:29

## 2024-09-08 RX ADMIN — HEPARIN SODIUM 5000 UNITS: 5000 INJECTION INTRAVENOUS; SUBCUTANEOUS at 22:29

## 2024-09-08 RX ADMIN — HEPARIN SODIUM 5000 UNITS: 5000 INJECTION INTRAVENOUS; SUBCUTANEOUS at 14:30

## 2024-09-08 RX ADMIN — SEVELAMER CARBONATE 800 MG: 800 TABLET, FILM COATED ORAL at 11:29

## 2024-09-08 RX ADMIN — HEPARIN SODIUM 5000 UNITS: 5000 INJECTION INTRAVENOUS; SUBCUTANEOUS at 07:08

## 2024-09-08 RX ADMIN — SODIUM CHLORIDE, PRESERVATIVE FREE 10 ML: 5 INJECTION INTRAVENOUS at 07:52

## 2024-09-08 RX ADMIN — HYDRALAZINE HYDROCHLORIDE 100 MG: 50 TABLET ORAL at 14:30

## 2024-09-08 RX ADMIN — AMLODIPINE BESYLATE 5 MG: 5 TABLET ORAL at 07:50

## 2024-09-08 RX ADMIN — SODIUM CHLORIDE, PRESERVATIVE FREE 10 ML: 5 INJECTION INTRAVENOUS at 22:34

## 2024-09-08 RX ADMIN — CINACALCET HYDROCHLORIDE 90 MG: 30 TABLET, FILM COATED ORAL at 07:50

## 2024-09-08 RX ADMIN — LABETALOL HYDROCHLORIDE 200 MG: 200 TABLET, FILM COATED ORAL at 22:29

## 2024-09-08 RX ADMIN — HYDRALAZINE HYDROCHLORIDE 50 MG: 50 TABLET ORAL at 07:07

## 2024-09-08 RX ADMIN — PANTOPRAZOLE SODIUM 40 MG: 40 TABLET, DELAYED RELEASE ORAL at 07:08

## 2024-09-08 RX ADMIN — LABETALOL HYDROCHLORIDE 200 MG: 200 TABLET, FILM COATED ORAL at 07:50

## 2024-09-08 RX ADMIN — SEVELAMER CARBONATE 800 MG: 800 TABLET, FILM COATED ORAL at 07:50

## 2024-09-08 RX ADMIN — CALCITRIOL CAPSULES 0.25 MCG 0.5 MCG: 0.25 CAPSULE ORAL at 07:50

## 2024-09-08 RX ADMIN — DOXYCYCLINE 100 MG: 100 INJECTION, POWDER, LYOPHILIZED, FOR SOLUTION INTRAVENOUS at 11:29

## 2024-09-08 RX ADMIN — HEPARIN SODIUM 3200 UNITS: 1000 INJECTION INTRAVENOUS; SUBCUTANEOUS at 07:05

## 2024-09-08 RX ADMIN — SEVELAMER CARBONATE 800 MG: 800 TABLET, FILM COATED ORAL at 16:39

## 2024-09-08 RX ADMIN — DOXYCYCLINE 100 MG: 100 INJECTION, POWDER, LYOPHILIZED, FOR SOLUTION INTRAVENOUS at 22:45

## 2024-09-08 ASSESSMENT — PAIN SCALES - GENERAL
PAINLEVEL_OUTOF10: 1
PAINLEVEL_OUTOF10: 0

## 2024-09-09 LAB
ANION GAP SERPL CALC-SCNC: 8 MMOL/L (ref 3–18)
BASOPHILS # BLD: 0 K/UL (ref 0–0.1)
BASOPHILS NFR BLD: 1 % (ref 0–2)
BUN SERPL-MCNC: 32 MG/DL (ref 7–18)
BUN/CREAT SERPL: 4 (ref 12–20)
CALCIUM SERPL-MCNC: 8.4 MG/DL (ref 8.5–10.1)
CHLORIDE SERPL-SCNC: 102 MMOL/L (ref 100–111)
CO2 SERPL-SCNC: 26 MMOL/L (ref 21–32)
CREAT SERPL-MCNC: 7.34 MG/DL (ref 0.6–1.3)
DIFFERENTIAL METHOD BLD: ABNORMAL
EOSINOPHIL # BLD: 0.4 K/UL (ref 0–0.4)
EOSINOPHIL NFR BLD: 7 % (ref 0–5)
ERYTHROCYTE [DISTWIDTH] IN BLOOD BY AUTOMATED COUNT: 20.7 % (ref 11.6–14.5)
GLUCOSE BLD STRIP.AUTO-MCNC: 123 MG/DL (ref 70–110)
GLUCOSE BLD STRIP.AUTO-MCNC: 134 MG/DL (ref 70–110)
GLUCOSE BLD STRIP.AUTO-MCNC: 90 MG/DL (ref 70–110)
GLUCOSE BLD STRIP.AUTO-MCNC: 91 MG/DL (ref 70–110)
GLUCOSE SERPL-MCNC: 97 MG/DL (ref 74–99)
HBV SURFACE AB SER QL IA: POSITIVE
HBV SURFACE AB SERPL IA-ACNC: 335.6 MIU/ML
HBV SURFACE AG SER QL: <0.1 INDEX
HBV SURFACE AG SER QL: NEGATIVE
HCT VFR BLD AUTO: 32.2 % (ref 36–48)
HEP BS AB COMMENT: NORMAL
HGB BLD-MCNC: 10.6 G/DL (ref 13–16)
IMM GRANULOCYTES # BLD AUTO: 0 K/UL (ref 0–0.04)
IMM GRANULOCYTES NFR BLD AUTO: 1 % (ref 0–0.5)
LYMPHOCYTES # BLD: 0.4 K/UL (ref 0.9–3.6)
LYMPHOCYTES NFR BLD: 9 % (ref 21–52)
MCH RBC QN AUTO: 25.7 PG (ref 24–34)
MCHC RBC AUTO-ENTMCNC: 32.9 G/DL (ref 31–37)
MCV RBC AUTO: 78 FL (ref 78–100)
MONOCYTES # BLD: 0.5 K/UL (ref 0.05–1.2)
MONOCYTES NFR BLD: 10 % (ref 3–10)
NEUTS SEG # BLD: 3.4 K/UL (ref 1.8–8)
NEUTS SEG NFR BLD: 72 % (ref 40–73)
NRBC # BLD: 0 K/UL (ref 0–0.01)
NRBC BLD-RTO: 0 PER 100 WBC
PLATELET # BLD AUTO: 157 K/UL (ref 135–420)
POTASSIUM SERPL-SCNC: 4.3 MMOL/L (ref 3.5–5.5)
RBC # BLD AUTO: 4.13 M/UL (ref 4.35–5.65)
SODIUM SERPL-SCNC: 136 MMOL/L (ref 136–145)
WBC # BLD AUTO: 4.7 K/UL (ref 4.6–13.2)

## 2024-09-09 PROCEDURE — 97165 OT EVAL LOW COMPLEX 30 MIN: CPT

## 2024-09-09 PROCEDURE — 36415 COLL VENOUS BLD VENIPUNCTURE: CPT

## 2024-09-09 PROCEDURE — 97161 PT EVAL LOW COMPLEX 20 MIN: CPT

## 2024-09-09 PROCEDURE — 97602 WOUND(S) CARE NON-SELECTIVE: CPT

## 2024-09-09 PROCEDURE — 6370000000 HC RX 637 (ALT 250 FOR IP): Performed by: STUDENT IN AN ORGANIZED HEALTH CARE EDUCATION/TRAINING PROGRAM

## 2024-09-09 PROCEDURE — 6370000000 HC RX 637 (ALT 250 FOR IP): Performed by: HOSPITALIST

## 2024-09-09 PROCEDURE — 82962 GLUCOSE BLOOD TEST: CPT

## 2024-09-09 PROCEDURE — 90935 HEMODIALYSIS ONE EVALUATION: CPT

## 2024-09-09 PROCEDURE — 80048 BASIC METABOLIC PNL TOTAL CA: CPT

## 2024-09-09 PROCEDURE — 85025 COMPLETE CBC W/AUTO DIFF WBC: CPT

## 2024-09-09 PROCEDURE — 2500000003 HC RX 250 WO HCPCS: Performed by: HOSPITALIST

## 2024-09-09 PROCEDURE — 97535 SELF CARE MNGMENT TRAINING: CPT

## 2024-09-09 PROCEDURE — 2580000003 HC RX 258: Performed by: HOSPITALIST

## 2024-09-09 PROCEDURE — 1100000003 HC PRIVATE W/ TELEMETRY

## 2024-09-09 PROCEDURE — 6360000002 HC RX W HCPCS: Performed by: HOSPITALIST

## 2024-09-09 PROCEDURE — 97116 GAIT TRAINING THERAPY: CPT

## 2024-09-09 PROCEDURE — 87340 HEPATITIS B SURFACE AG IA: CPT

## 2024-09-09 PROCEDURE — 86706 HEP B SURFACE ANTIBODY: CPT

## 2024-09-09 RX ORDER — CIPROFLOXACIN HYDROCHLORIDE 3.5 MG/ML
1 SOLUTION/ DROPS TOPICAL
Status: DISCONTINUED | OUTPATIENT
Start: 2024-09-09 | End: 2024-09-10 | Stop reason: HOSPADM

## 2024-09-09 RX ORDER — GUAIFENESIN 600 MG/1
600 TABLET, EXTENDED RELEASE ORAL 2 TIMES DAILY
Status: DISCONTINUED | OUTPATIENT
Start: 2024-09-09 | End: 2024-09-10 | Stop reason: HOSPADM

## 2024-09-09 RX ORDER — DOXYCYCLINE 100 MG/1
100 CAPSULE ORAL EVERY 12 HOURS SCHEDULED
Status: DISCONTINUED | OUTPATIENT
Start: 2024-09-09 | End: 2024-09-10 | Stop reason: HOSPADM

## 2024-09-09 RX ADMIN — HEPARIN SODIUM 5000 UNITS: 5000 INJECTION INTRAVENOUS; SUBCUTANEOUS at 13:51

## 2024-09-09 RX ADMIN — CINACALCET HYDROCHLORIDE 90 MG: 30 TABLET, FILM COATED ORAL at 08:31

## 2024-09-09 RX ADMIN — LABETALOL HYDROCHLORIDE 200 MG: 200 TABLET, FILM COATED ORAL at 08:32

## 2024-09-09 RX ADMIN — CALCITRIOL CAPSULES 0.25 MCG 0.5 MCG: 0.25 CAPSULE ORAL at 08:32

## 2024-09-09 RX ADMIN — CIPROFLOXACIN 1 DROP: 3 SOLUTION OPHTHALMIC at 13:51

## 2024-09-09 RX ADMIN — SEVELAMER CARBONATE 800 MG: 800 TABLET, FILM COATED ORAL at 11:22

## 2024-09-09 RX ADMIN — HEPARIN SODIUM 5000 UNITS: 5000 INJECTION INTRAVENOUS; SUBCUTANEOUS at 06:36

## 2024-09-09 RX ADMIN — SEVELAMER CARBONATE 800 MG: 800 TABLET, FILM COATED ORAL at 08:31

## 2024-09-09 RX ADMIN — SEVELAMER CARBONATE 800 MG: 800 TABLET, FILM COATED ORAL at 17:24

## 2024-09-09 RX ADMIN — DOXYCYCLINE 100 MG: 100 INJECTION, POWDER, LYOPHILIZED, FOR SOLUTION INTRAVENOUS at 11:18

## 2024-09-09 RX ADMIN — HYDRALAZINE HYDROCHLORIDE 100 MG: 50 TABLET ORAL at 13:51

## 2024-09-09 RX ADMIN — GUAIFENESIN 600 MG: 600 TABLET, EXTENDED RELEASE ORAL at 13:51

## 2024-09-09 RX ADMIN — CIPROFLOXACIN 1 DROP: 3 SOLUTION OPHTHALMIC at 17:24

## 2024-09-09 RX ADMIN — SODIUM CHLORIDE, PRESERVATIVE FREE 10 ML: 5 INJECTION INTRAVENOUS at 08:32

## 2024-09-09 RX ADMIN — PANTOPRAZOLE SODIUM 40 MG: 40 TABLET, DELAYED RELEASE ORAL at 06:37

## 2024-09-09 RX ADMIN — AMLODIPINE BESYLATE 10 MG: 5 TABLET ORAL at 08:32

## 2024-09-09 RX ADMIN — HYDRALAZINE HYDROCHLORIDE 100 MG: 50 TABLET ORAL at 06:36

## 2024-09-09 ASSESSMENT — PAIN DESCRIPTION - LOCATION: LOCATION: LEG

## 2024-09-09 ASSESSMENT — PAIN SCALES - GENERAL
PAINLEVEL_OUTOF10: 0

## 2024-09-09 ASSESSMENT — PAIN DESCRIPTION - ORIENTATION: ORIENTATION: RIGHT

## 2024-09-10 VITALS
HEART RATE: 77 BPM | TEMPERATURE: 97.9 F | RESPIRATION RATE: 16 BRPM | BODY MASS INDEX: 29.05 KG/M2 | OXYGEN SATURATION: 99 % | WEIGHT: 180.78 LBS | SYSTOLIC BLOOD PRESSURE: 111 MMHG | HEIGHT: 66 IN | DIASTOLIC BLOOD PRESSURE: 59 MMHG

## 2024-09-10 LAB
GLUCOSE BLD STRIP.AUTO-MCNC: 105 MG/DL (ref 70–110)
GLUCOSE BLD STRIP.AUTO-MCNC: 108 MG/DL (ref 70–110)

## 2024-09-10 PROCEDURE — 6360000002 HC RX W HCPCS: Performed by: HOSPITALIST

## 2024-09-10 PROCEDURE — 82962 GLUCOSE BLOOD TEST: CPT

## 2024-09-10 PROCEDURE — 6360000002 HC RX W HCPCS: Performed by: STUDENT IN AN ORGANIZED HEALTH CARE EDUCATION/TRAINING PROGRAM

## 2024-09-10 PROCEDURE — 2580000003 HC RX 258: Performed by: HOSPITALIST

## 2024-09-10 PROCEDURE — 6370000000 HC RX 637 (ALT 250 FOR IP): Performed by: STUDENT IN AN ORGANIZED HEALTH CARE EDUCATION/TRAINING PROGRAM

## 2024-09-10 PROCEDURE — 6370000000 HC RX 637 (ALT 250 FOR IP): Performed by: HOSPITALIST

## 2024-09-10 RX ORDER — CIPROFLOXACIN HYDROCHLORIDE 3.5 MG/ML
1 SOLUTION/ DROPS TOPICAL
Qty: 2.5 ML | Refills: 0 | Status: SHIPPED | OUTPATIENT
Start: 2024-09-10 | End: 2024-09-20

## 2024-09-10 RX ORDER — CINACALCET 90 MG/1
90 TABLET, FILM COATED ORAL DAILY
Qty: 30 TABLET | Refills: 3 | Status: ON HOLD | OUTPATIENT
Start: 2024-09-11

## 2024-09-10 RX ORDER — LABETALOL 200 MG/1
200 TABLET, FILM COATED ORAL 2 TIMES DAILY
Qty: 60 TABLET | Refills: 0 | Status: ON HOLD | OUTPATIENT
Start: 2024-09-10

## 2024-09-10 RX ORDER — DOXYCYCLINE 100 MG/1
100 CAPSULE ORAL EVERY 12 HOURS SCHEDULED
Qty: 6 CAPSULE | Refills: 0 | Status: SHIPPED | OUTPATIENT
Start: 2024-09-10 | End: 2024-09-13

## 2024-09-10 RX ORDER — AMLODIPINE BESYLATE 10 MG/1
10 TABLET ORAL DAILY
Qty: 30 TABLET | Refills: 0 | Status: ON HOLD | OUTPATIENT
Start: 2024-09-10

## 2024-09-10 RX ORDER — HYDRALAZINE HYDROCHLORIDE 100 MG/1
100 TABLET, FILM COATED ORAL EVERY 8 HOURS SCHEDULED
Qty: 90 TABLET | Refills: 0 | Status: ON HOLD | OUTPATIENT
Start: 2024-09-10

## 2024-09-10 RX ADMIN — GUAIFENESIN 600 MG: 600 TABLET, EXTENDED RELEASE ORAL at 02:41

## 2024-09-10 RX ADMIN — CALCITRIOL CAPSULES 0.25 MCG 0.5 MCG: 0.25 CAPSULE ORAL at 08:19

## 2024-09-10 RX ADMIN — HEPARIN SODIUM 5000 UNITS: 5000 INJECTION INTRAVENOUS; SUBCUTANEOUS at 06:38

## 2024-09-10 RX ADMIN — PANTOPRAZOLE SODIUM 40 MG: 40 TABLET, DELAYED RELEASE ORAL at 06:38

## 2024-09-10 RX ADMIN — LABETALOL HYDROCHLORIDE 200 MG: 200 TABLET, FILM COATED ORAL at 00:55

## 2024-09-10 RX ADMIN — DOXYCYCLINE 100 MG: 100 CAPSULE ORAL at 08:19

## 2024-09-10 RX ADMIN — HYDRALAZINE HYDROCHLORIDE 100 MG: 50 TABLET ORAL at 00:55

## 2024-09-10 RX ADMIN — GUAIFENESIN 600 MG: 600 TABLET, EXTENDED RELEASE ORAL at 08:18

## 2024-09-10 RX ADMIN — DOXYCYCLINE 100 MG: 100 CAPSULE ORAL at 02:40

## 2024-09-10 RX ADMIN — AMLODIPINE BESYLATE 10 MG: 5 TABLET ORAL at 08:19

## 2024-09-10 RX ADMIN — LABETALOL HYDROCHLORIDE 200 MG: 200 TABLET, FILM COATED ORAL at 08:19

## 2024-09-10 RX ADMIN — CINACALCET HYDROCHLORIDE 90 MG: 30 TABLET, FILM COATED ORAL at 08:19

## 2024-09-10 RX ADMIN — CIPROFLOXACIN 1 DROP: 3 SOLUTION OPHTHALMIC at 06:49

## 2024-09-10 RX ADMIN — SEVELAMER CARBONATE 800 MG: 800 TABLET, FILM COATED ORAL at 08:19

## 2024-09-10 RX ADMIN — CIPROFLOXACIN 1 DROP: 3 SOLUTION OPHTHALMIC at 08:22

## 2024-09-10 RX ADMIN — SODIUM CHLORIDE, PRESERVATIVE FREE 10 ML: 5 INJECTION INTRAVENOUS at 08:19

## 2024-09-10 RX ADMIN — HEPARIN SODIUM 3200 UNITS: 1000 INJECTION INTRAVENOUS; SUBCUTANEOUS at 02:23

## 2024-09-10 RX ADMIN — HYDRALAZINE HYDROCHLORIDE 100 MG: 50 TABLET ORAL at 06:38

## 2024-09-10 ASSESSMENT — PAIN SCALES - GENERAL
PAINLEVEL_OUTOF10: 0
PAINLEVEL_OUTOF10: 0

## 2024-09-25 ENCOUNTER — HOSPITAL ENCOUNTER (INPATIENT)
Facility: HOSPITAL | Age: 54
LOS: 6 days | Discharge: HOME HEALTH | DRG: 304 | End: 2024-10-01
Attending: EMERGENCY MEDICINE | Admitting: INTERNAL MEDICINE
Payer: MEDICARE

## 2024-09-25 ENCOUNTER — APPOINTMENT (OUTPATIENT)
Facility: HOSPITAL | Age: 54
DRG: 304 | End: 2024-09-25
Payer: MEDICARE

## 2024-09-25 DIAGNOSIS — R79.89 ELEVATED TROPONIN: ICD-10-CM

## 2024-09-25 DIAGNOSIS — R07.9 CHEST PAIN, UNSPECIFIED TYPE: Primary | ICD-10-CM

## 2024-09-25 DIAGNOSIS — I25.10 CORONARY ARTERY DISEASE DUE TO CALCIFIED CORONARY LESION: ICD-10-CM

## 2024-09-25 DIAGNOSIS — I25.84 CORONARY ARTERY DISEASE DUE TO CALCIFIED CORONARY LESION: ICD-10-CM

## 2024-09-25 LAB
ALBUMIN SERPL-MCNC: 3.4 G/DL (ref 3.4–5)
ALBUMIN/GLOB SERPL: 0.8 (ref 0.8–1.7)
ALP SERPL-CCNC: 86 U/L (ref 45–117)
ALT SERPL-CCNC: 36 U/L (ref 16–61)
ANION GAP SERPL CALC-SCNC: 15 MMOL/L (ref 3–18)
AST SERPL-CCNC: 17 U/L (ref 10–38)
BASOPHILS # BLD: 0 K/UL (ref 0–0.1)
BASOPHILS NFR BLD: 0 % (ref 0–2)
BILIRUB SERPL-MCNC: 1.2 MG/DL (ref 0.2–1)
BUN SERPL-MCNC: 64 MG/DL (ref 7–18)
BUN/CREAT SERPL: 7 (ref 12–20)
CALCIUM SERPL-MCNC: 8.1 MG/DL (ref 8.5–10.1)
CHLORIDE SERPL-SCNC: 100 MMOL/L (ref 100–111)
CO2 SERPL-SCNC: 22 MMOL/L (ref 21–32)
CREAT SERPL-MCNC: 8.61 MG/DL (ref 0.6–1.3)
D DIMER PPP FEU-MCNC: 0.86 UG/ML(FEU)
DIFFERENTIAL METHOD BLD: ABNORMAL
EOSINOPHIL # BLD: 0.1 K/UL (ref 0–0.4)
EOSINOPHIL NFR BLD: 2 % (ref 0–5)
ERYTHROCYTE [DISTWIDTH] IN BLOOD BY AUTOMATED COUNT: 21.9 % (ref 11.6–14.5)
GLOBULIN SER CALC-MCNC: 4.3 G/DL (ref 2–4)
GLUCOSE SERPL-MCNC: 72 MG/DL (ref 74–99)
HCT VFR BLD AUTO: 28.8 % (ref 36–48)
HGB BLD-MCNC: 10.2 G/DL (ref 13–16)
IMM GRANULOCYTES # BLD AUTO: 0 K/UL (ref 0–0.04)
IMM GRANULOCYTES NFR BLD AUTO: 0 % (ref 0–0.5)
LYMPHOCYTES # BLD: 0.4 K/UL (ref 0.9–3.6)
LYMPHOCYTES NFR BLD: 7 % (ref 21–52)
MCH RBC QN AUTO: 26.6 PG (ref 24–34)
MCHC RBC AUTO-ENTMCNC: 35.4 G/DL (ref 31–37)
MCV RBC AUTO: 75.2 FL (ref 78–100)
MONOCYTES # BLD: 0.4 K/UL (ref 0.05–1.2)
MONOCYTES NFR BLD: 6 % (ref 3–10)
NEUTS SEG # BLD: 5.1 K/UL (ref 1.8–8)
NEUTS SEG NFR BLD: 85 % (ref 40–73)
NRBC # BLD: 0 K/UL (ref 0–0.01)
NRBC BLD-RTO: 0 PER 100 WBC
PLATELET # BLD AUTO: 110 K/UL (ref 135–420)
POTASSIUM SERPL-SCNC: 3.6 MMOL/L (ref 3.5–5.5)
PROT SERPL-MCNC: 7.7 G/DL (ref 6.4–8.2)
RBC # BLD AUTO: 3.83 M/UL (ref 4.35–5.65)
RBC MORPH BLD: ABNORMAL
SODIUM SERPL-SCNC: 137 MMOL/L (ref 136–145)
TROPONIN I SERPL HS-MCNC: 111 NG/L (ref 0–78)
TROPONIN I SERPL HS-MCNC: 83 NG/L (ref 0–78)
WBC # BLD AUTO: 6 K/UL (ref 4.6–13.2)

## 2024-09-25 PROCEDURE — 6370000000 HC RX 637 (ALT 250 FOR IP): Performed by: INTERNAL MEDICINE

## 2024-09-25 PROCEDURE — 85379 FIBRIN DEGRADATION QUANT: CPT

## 2024-09-25 PROCEDURE — 96372 THER/PROPH/DIAG INJ SC/IM: CPT

## 2024-09-25 PROCEDURE — 6360000002 HC RX W HCPCS: Performed by: INTERNAL MEDICINE

## 2024-09-25 PROCEDURE — 85025 COMPLETE CBC W/AUTO DIFF WBC: CPT

## 2024-09-25 PROCEDURE — 1100000003 HC PRIVATE W/ TELEMETRY

## 2024-09-25 PROCEDURE — G0378 HOSPITAL OBSERVATION PER HR: HCPCS

## 2024-09-25 PROCEDURE — 93005 ELECTROCARDIOGRAM TRACING: CPT | Performed by: EMERGENCY MEDICINE

## 2024-09-25 PROCEDURE — 99285 EMERGENCY DEPT VISIT HI MDM: CPT

## 2024-09-25 PROCEDURE — 6360000002 HC RX W HCPCS: Performed by: EMERGENCY MEDICINE

## 2024-09-25 PROCEDURE — 96375 TX/PRO/DX INJ NEW DRUG ADDON: CPT

## 2024-09-25 PROCEDURE — 80053 COMPREHEN METABOLIC PANEL: CPT

## 2024-09-25 PROCEDURE — 71045 X-RAY EXAM CHEST 1 VIEW: CPT

## 2024-09-25 PROCEDURE — 84484 ASSAY OF TROPONIN QUANT: CPT

## 2024-09-25 PROCEDURE — 96374 THER/PROPH/DIAG INJ IV PUSH: CPT

## 2024-09-25 RX ORDER — LANOLIN ALCOHOL/MO/W.PET/CERES
1000 CREAM (GRAM) TOPICAL DAILY
Status: DISCONTINUED | OUTPATIENT
Start: 2024-09-25 | End: 2024-10-01 | Stop reason: HOSPADM

## 2024-09-25 RX ORDER — HEPARIN SODIUM 5000 [USP'U]/ML
5000 INJECTION, SOLUTION INTRAVENOUS; SUBCUTANEOUS EVERY 8 HOURS SCHEDULED
Status: DISCONTINUED | OUTPATIENT
Start: 2024-09-25 | End: 2024-10-01 | Stop reason: HOSPADM

## 2024-09-25 RX ORDER — ONDANSETRON 4 MG/1
4 TABLET, ORALLY DISINTEGRATING ORAL EVERY 8 HOURS PRN
Status: DISCONTINUED | OUTPATIENT
Start: 2024-09-25 | End: 2024-10-01 | Stop reason: HOSPADM

## 2024-09-25 RX ORDER — HYDRALAZINE HYDROCHLORIDE 20 MG/ML
10 INJECTION INTRAMUSCULAR; INTRAVENOUS EVERY 6 HOURS PRN
Status: DISCONTINUED | OUTPATIENT
Start: 2024-09-25 | End: 2024-10-01 | Stop reason: HOSPADM

## 2024-09-25 RX ORDER — HYDRALAZINE HYDROCHLORIDE 50 MG/1
100 TABLET, FILM COATED ORAL EVERY 8 HOURS SCHEDULED
Status: DISCONTINUED | OUTPATIENT
Start: 2024-09-25 | End: 2024-10-01 | Stop reason: HOSPADM

## 2024-09-25 RX ORDER — MAGNESIUM SULFATE IN WATER 40 MG/ML
2000 INJECTION, SOLUTION INTRAVENOUS PRN
Status: DISCONTINUED | OUTPATIENT
Start: 2024-09-25 | End: 2024-10-01 | Stop reason: HOSPADM

## 2024-09-25 RX ORDER — LABETALOL 200 MG/1
200 TABLET, FILM COATED ORAL 2 TIMES DAILY
Status: DISCONTINUED | OUTPATIENT
Start: 2024-09-25 | End: 2024-10-01 | Stop reason: HOSPADM

## 2024-09-25 RX ORDER — AMLODIPINE BESYLATE 5 MG/1
10 TABLET ORAL DAILY
Status: DISCONTINUED | OUTPATIENT
Start: 2024-09-25 | End: 2024-09-26

## 2024-09-25 RX ORDER — ONDANSETRON 2 MG/ML
4 INJECTION INTRAMUSCULAR; INTRAVENOUS EVERY 6 HOURS PRN
Status: DISCONTINUED | OUTPATIENT
Start: 2024-09-25 | End: 2024-10-01 | Stop reason: HOSPADM

## 2024-09-25 RX ORDER — SODIUM CHLORIDE 0.9 % (FLUSH) 0.9 %
5-40 SYRINGE (ML) INJECTION EVERY 12 HOURS SCHEDULED
Status: DISCONTINUED | OUTPATIENT
Start: 2024-09-25 | End: 2024-10-01 | Stop reason: HOSPADM

## 2024-09-25 RX ORDER — SODIUM CHLORIDE 9 MG/ML
INJECTION, SOLUTION INTRAVENOUS PRN
Status: DISCONTINUED | OUTPATIENT
Start: 2024-09-25 | End: 2024-10-01 | Stop reason: HOSPADM

## 2024-09-25 RX ORDER — LABETALOL HYDROCHLORIDE 5 MG/ML
10 INJECTION, SOLUTION INTRAVENOUS EVERY 6 HOURS PRN
Status: DISCONTINUED | OUTPATIENT
Start: 2024-09-25 | End: 2024-10-01 | Stop reason: HOSPADM

## 2024-09-25 RX ORDER — ACETAMINOPHEN 325 MG/1
650 TABLET ORAL EVERY 6 HOURS PRN
Status: DISCONTINUED | OUTPATIENT
Start: 2024-09-25 | End: 2024-10-01 | Stop reason: HOSPADM

## 2024-09-25 RX ORDER — SODIUM CHLORIDE 0.9 % (FLUSH) 0.9 %
5-40 SYRINGE (ML) INJECTION PRN
Status: DISCONTINUED | OUTPATIENT
Start: 2024-09-25 | End: 2024-10-01 | Stop reason: HOSPADM

## 2024-09-25 RX ORDER — ACETAMINOPHEN 650 MG/1
650 SUPPOSITORY RECTAL EVERY 6 HOURS PRN
Status: DISCONTINUED | OUTPATIENT
Start: 2024-09-25 | End: 2024-10-01 | Stop reason: HOSPADM

## 2024-09-25 RX ORDER — SEVELAMER CARBONATE 800 MG/1
800 TABLET, FILM COATED ORAL
Status: DISCONTINUED | OUTPATIENT
Start: 2024-09-26 | End: 2024-10-01 | Stop reason: HOSPADM

## 2024-09-25 RX ORDER — HYDRALAZINE HYDROCHLORIDE 20 MG/ML
20 INJECTION INTRAMUSCULAR; INTRAVENOUS ONCE
Status: COMPLETED | OUTPATIENT
Start: 2024-09-25 | End: 2024-09-25

## 2024-09-25 RX ORDER — CALCITRIOL 0.25 UG/1
0.5 CAPSULE, LIQUID FILLED ORAL DAILY
Status: DISCONTINUED | OUTPATIENT
Start: 2024-09-25 | End: 2024-10-01 | Stop reason: HOSPADM

## 2024-09-25 RX ORDER — CINACALCET 30 MG/1
90 TABLET, FILM COATED ORAL DAILY
Status: DISCONTINUED | OUTPATIENT
Start: 2024-09-25 | End: 2024-10-01 | Stop reason: HOSPADM

## 2024-09-25 RX ORDER — POLYETHYLENE GLYCOL 3350 17 G/17G
17 POWDER, FOR SOLUTION ORAL DAILY PRN
Status: DISCONTINUED | OUTPATIENT
Start: 2024-09-25 | End: 2024-10-01 | Stop reason: HOSPADM

## 2024-09-25 RX ADMIN — CYANOCOBALAMIN TAB 1000 MCG 1000 MCG: 1000 TAB at 22:41

## 2024-09-25 RX ADMIN — LABETALOL HYDROCHLORIDE 200 MG: 200 TABLET, FILM COATED ORAL at 22:36

## 2024-09-25 RX ADMIN — CINACALCET HYDROCHLORIDE 90 MG: 30 TABLET, FILM COATED ORAL at 22:37

## 2024-09-25 RX ADMIN — HYDRALAZINE HYDROCHLORIDE 20 MG: 20 INJECTION INTRAMUSCULAR; INTRAVENOUS at 18:13

## 2024-09-25 RX ADMIN — HYDRALAZINE HYDROCHLORIDE 100 MG: 50 TABLET ORAL at 22:37

## 2024-09-25 RX ADMIN — CALCITRIOL 0.5 MCG: 0.25 CAPSULE ORAL at 22:37

## 2024-09-25 RX ADMIN — HEPARIN SODIUM 5000 UNITS: 5000 INJECTION INTRAVENOUS; SUBCUTANEOUS at 22:37

## 2024-09-25 ASSESSMENT — PAIN SCALES - GENERAL: PAINLEVEL_OUTOF10: 0

## 2024-09-25 NOTE — ED TRIAGE NOTES
Patient was at dialysis. They called because his b/p was not going down. Patient had one hour left took 2.8 kg off of him. Patient c/o chest pain per ems. Patient arrived and now chest pain has resolved

## 2024-09-26 ENCOUNTER — APPOINTMENT (OUTPATIENT)
Facility: HOSPITAL | Age: 54
DRG: 304 | End: 2024-09-26
Payer: MEDICARE

## 2024-09-26 ENCOUNTER — APPOINTMENT (OUTPATIENT)
Facility: HOSPITAL | Age: 54
DRG: 304 | End: 2024-09-26
Attending: INTERNAL MEDICINE
Payer: MEDICARE

## 2024-09-26 LAB
ALBUMIN SERPL-MCNC: 3 G/DL (ref 3.4–5)
ALBUMIN/GLOB SERPL: 0.7 (ref 0.8–1.7)
ALP SERPL-CCNC: 83 U/L (ref 45–117)
ALT SERPL-CCNC: 29 U/L (ref 16–61)
ANION GAP BLD CALC-SCNC: ABNORMAL MMOL/L (ref 10–20)
ANION GAP SERPL CALC-SCNC: 15 MMOL/L (ref 3–18)
ARTERIAL PATENCY WRIST A: POSITIVE
AST SERPL-CCNC: 12 U/L (ref 10–38)
BASE EXCESS BLD CALC-SCNC: 1.3 MMOL/L
BDY SITE: ABNORMAL
BILIRUB SERPL-MCNC: 0.9 MG/DL (ref 0.2–1)
BODY TEMPERATURE: 98
BUN SERPL-MCNC: 76 MG/DL (ref 7–18)
BUN/CREAT SERPL: 7 (ref 12–20)
CA-I BLD-MCNC: 1 MMOL/L (ref 1.15–1.33)
CALCIUM SERPL-MCNC: 7.9 MG/DL (ref 8.5–10.1)
CHLORIDE BLD-SCNC: 103 MMOL/L (ref 98–107)
CHLORIDE SERPL-SCNC: 101 MMOL/L (ref 100–111)
CO2 BLD-SCNC: 25 MMOL/L (ref 22–29)
CO2 SERPL-SCNC: 23 MMOL/L (ref 21–32)
CREAT BLD-MCNC: 7.74 MG/DL (ref 0.6–1.3)
CREAT SERPL-MCNC: 10.4 MG/DL (ref 0.6–1.3)
ECHO AO ROOT DIAM: 2.8 CM
ECHO AO ROOT INDEX: 1.47 CM/M2
ECHO AV AREA PEAK VELOCITY: 2.5 CM2
ECHO AV AREA VTI: 2.7 CM2
ECHO AV AREA/BSA PEAK VELOCITY: 1.3 CM2/M2
ECHO AV AREA/BSA VTI: 1.4 CM2/M2
ECHO AV MEAN GRADIENT: 6 MMHG
ECHO AV MEAN VELOCITY: 1.2 M/S
ECHO AV PEAK GRADIENT: 12 MMHG
ECHO AV PEAK VELOCITY: 1.8 M/S
ECHO AV VELOCITY RATIO: 0.78
ECHO AV VTI: 35.3 CM
ECHO BSA: 1.95 M2
ECHO EST RA PRESSURE: 3 MMHG
ECHO LA DIAMETER INDEX: 2.77 CM/M2
ECHO LA DIAMETER: 5.3 CM
ECHO LA TO AORTIC ROOT RATIO: 1.89
ECHO LA VOL A-L A2C: 58 ML (ref 18–58)
ECHO LA VOL A-L A4C: 78 ML (ref 18–58)
ECHO LA VOL BP: 67 ML (ref 18–58)
ECHO LA VOL MOD A2C: 55 ML (ref 18–58)
ECHO LA VOL MOD A4C: 74 ML (ref 18–58)
ECHO LA VOL/BSA BIPLANE: 35 ML/M2 (ref 16–34)
ECHO LA VOLUME AREA LENGTH: 71 ML
ECHO LA VOLUME INDEX A-L A2C: 30 ML/M2 (ref 16–34)
ECHO LA VOLUME INDEX A-L A4C: 41 ML/M2 (ref 16–34)
ECHO LA VOLUME INDEX AREA LENGTH: 37 ML/M2 (ref 16–34)
ECHO LA VOLUME INDEX MOD A2C: 29 ML/M2 (ref 16–34)
ECHO LA VOLUME INDEX MOD A4C: 39 ML/M2 (ref 16–34)
ECHO LV E' LATERAL VELOCITY: 5.1 CM/S
ECHO LV E' SEPTAL VELOCITY: 5.9 CM/S
ECHO LV EDV A2C: 83 ML
ECHO LV EDV A4C: 97 ML
ECHO LV EDV BP: 92 ML (ref 67–155)
ECHO LV EDV INDEX A4C: 51 ML/M2
ECHO LV EDV INDEX BP: 48 ML/M2
ECHO LV EDV NDEX A2C: 43 ML/M2
ECHO LV EJECTION FRACTION A2C: 54 %
ECHO LV EJECTION FRACTION A4C: 64 %
ECHO LV EJECTION FRACTION BIPLANE: 58 % (ref 55–100)
ECHO LV ESV A2C: 39 ML
ECHO LV ESV A4C: 35 ML
ECHO LV ESV BP: 38 ML (ref 22–58)
ECHO LV ESV INDEX A2C: 20 ML/M2
ECHO LV ESV INDEX A4C: 18 ML/M2
ECHO LV ESV INDEX BP: 20 ML/M2
ECHO LV FRACTIONAL SHORTENING: 33 % (ref 28–44)
ECHO LV GLOBAL LONGITUDINAL STRAIN (GLS): -13.6 %
ECHO LV INTERNAL DIMENSION DIASTOLE INDEX: 2.83 CM/M2
ECHO LV INTERNAL DIMENSION DIASTOLIC: 5.4 CM (ref 4.2–5.9)
ECHO LV INTERNAL DIMENSION SYSTOLIC INDEX: 1.88 CM/M2
ECHO LV INTERNAL DIMENSION SYSTOLIC: 3.6 CM
ECHO LV IVSD: 1.3 CM (ref 0.6–1)
ECHO LV MASS 2D: 295.6 G (ref 88–224)
ECHO LV MASS INDEX 2D: 154.8 G/M2 (ref 49–115)
ECHO LV POSTERIOR WALL DIASTOLIC: 1.3 CM (ref 0.6–1)
ECHO LV RELATIVE WALL THICKNESS RATIO: 0.48
ECHO LVOT AREA: 3.1 CM2
ECHO LVOT AV VTI INDEX: 0.86
ECHO LVOT DIAM: 2 CM
ECHO LVOT MEAN GRADIENT: 4 MMHG
ECHO LVOT PEAK GRADIENT: 8 MMHG
ECHO LVOT PEAK VELOCITY: 1.4 M/S
ECHO LVOT STROKE VOLUME INDEX: 49.8 ML/M2
ECHO LVOT SV: 95.1 ML
ECHO LVOT VTI: 30.3 CM
ECHO MV A VELOCITY: 1.1 M/S
ECHO MV AREA VTI: 2.4 CM2
ECHO MV E DECELERATION TIME (DT): 169.4 MS
ECHO MV E VELOCITY: 1.28 M/S
ECHO MV E/A RATIO: 1.16
ECHO MV E/E' LATERAL: 25.1
ECHO MV E/E' RATIO (AVERAGED): 23.4
ECHO MV E/E' SEPTAL: 21.69
ECHO MV LVOT VTI INDEX: 1.31
ECHO MV MAX VELOCITY: 1.2 M/S
ECHO MV MEAN GRADIENT: 3 MMHG
ECHO MV MEAN VELOCITY: 0.9 M/S
ECHO MV PEAK GRADIENT: 6 MMHG
ECHO MV VTI: 39.8 CM
ECHO RA END SYSTOLIC VOLUME APICAL 4 CHAMBER INDEX BSA: 38 ML/M2
ECHO RA VOLUME: 72 ML
ECHO RIGHT VENTRICULAR SYSTOLIC PRESSURE (RVSP): 37 MMHG
ECHO RV FREE WALL PEAK S': 11.4 CM/S
ECHO RV INTERNAL DIMENSION: 3.4 CM
ECHO RV TAPSE: 2.2 CM (ref 1.7–?)
ECHO TV REGURGITANT MAX VELOCITY: 2.9 M/S
ECHO TV REGURGITANT PEAK GRADIENT: 34 MMHG
EKG ATRIAL RATE: 90 BPM
EKG ATRIAL RATE: 91 BPM
EKG DIAGNOSIS: NORMAL
EKG DIAGNOSIS: NORMAL
EKG P AXIS: 75 DEGREES
EKG P AXIS: 79 DEGREES
EKG P-R INTERVAL: 172 MS
EKG P-R INTERVAL: 172 MS
EKG Q-T INTERVAL: 420 MS
EKG Q-T INTERVAL: 438 MS
EKG QRS DURATION: 86 MS
EKG QRS DURATION: 90 MS
EKG QTC CALCULATION (BAZETT): 516 MS
EKG QTC CALCULATION (BAZETT): 535 MS
EKG R AXIS: -20 DEGREES
EKG R AXIS: -8 DEGREES
EKG T AXIS: 109 DEGREES
EKG T AXIS: 112 DEGREES
EKG VENTRICULAR RATE: 90 BPM
EKG VENTRICULAR RATE: 91 BPM
ERYTHROCYTE [DISTWIDTH] IN BLOOD BY AUTOMATED COUNT: 21.9 % (ref 11.6–14.5)
FIO2 ON VENT: 3 %
GAS FLOW.O2 O2 DELIVERY SYS: ABNORMAL
GLOBULIN SER CALC-MCNC: 4.2 G/DL (ref 2–4)
GLUCOSE BLD-MCNC: 111 MG/DL (ref 74–99)
GLUCOSE SERPL-MCNC: 105 MG/DL (ref 74–99)
HCO3 BLD-SCNC: 25 MMOL/L (ref 21–28)
HCT VFR BLD AUTO: 30.7 % (ref 36–48)
HGB BLD-MCNC: 10.6 G/DL (ref 13–16)
LACTATE BLD-SCNC: 0.68 MMOL/L (ref 0.4–2)
MAGNESIUM SERPL-MCNC: 2.4 MG/DL (ref 1.6–2.6)
MCH RBC QN AUTO: 26.4 PG (ref 24–34)
MCHC RBC AUTO-ENTMCNC: 34.5 G/DL (ref 31–37)
MCV RBC AUTO: 76.4 FL (ref 78–100)
NRBC # BLD: 0 K/UL (ref 0–0.01)
NRBC BLD-RTO: 0 PER 100 WBC
PCO2 BLD: 34.7 MMHG (ref 35–48)
PH BLD: 7.46 (ref 7.35–7.45)
PLATELET # BLD AUTO: 117 K/UL (ref 135–420)
PO2 BLD: 61 MMHG (ref 83–108)
POTASSIUM BLD-SCNC: 4.2 MMOL/L (ref 3.5–5.1)
POTASSIUM SERPL-SCNC: 4.2 MMOL/L (ref 3.5–5.5)
PROT SERPL-MCNC: 7.2 G/DL (ref 6.4–8.2)
RBC # BLD AUTO: 4.02 M/UL (ref 4.35–5.65)
SAO2 % BLD: 93 % (ref 94–98)
SERVICE CMNT-IMP: ABNORMAL
SODIUM BLD-SCNC: 136 MMOL/L (ref 136–145)
SODIUM SERPL-SCNC: 139 MMOL/L (ref 136–145)
SPECIMEN SITE: ABNORMAL
TROPONIN I SERPL HS-MCNC: 101 NG/L (ref 0–78)
TROPONIN I SERPL HS-MCNC: 89 NG/L (ref 0–78)
TROPONIN I SERPL HS-MCNC: 91 NG/L (ref 0–78)
WBC # BLD AUTO: 5.8 K/UL (ref 4.6–13.2)

## 2024-09-26 PROCEDURE — 83735 ASSAY OF MAGNESIUM: CPT

## 2024-09-26 PROCEDURE — 93306 TTE W/DOPPLER COMPLETE: CPT

## 2024-09-26 PROCEDURE — 96375 TX/PRO/DX INJ NEW DRUG ADDON: CPT

## 2024-09-26 PROCEDURE — 84484 ASSAY OF TROPONIN QUANT: CPT

## 2024-09-26 PROCEDURE — 96372 THER/PROPH/DIAG INJ SC/IM: CPT

## 2024-09-26 PROCEDURE — 84132 ASSAY OF SERUM POTASSIUM: CPT

## 2024-09-26 PROCEDURE — 85027 COMPLETE CBC AUTOMATED: CPT

## 2024-09-26 PROCEDURE — G0378 HOSPITAL OBSERVATION PER HR: HCPCS

## 2024-09-26 PROCEDURE — 96366 THER/PROPH/DIAG IV INF ADDON: CPT

## 2024-09-26 PROCEDURE — 6370000000 HC RX 637 (ALT 250 FOR IP): Performed by: INTERNAL MEDICINE

## 2024-09-26 PROCEDURE — 36600 WITHDRAWAL OF ARTERIAL BLOOD: CPT

## 2024-09-26 PROCEDURE — 80053 COMPREHEN METABOLIC PANEL: CPT

## 2024-09-26 PROCEDURE — 2580000003 HC RX 258: Performed by: INTERNAL MEDICINE

## 2024-09-26 PROCEDURE — 1100000003 HC PRIVATE W/ TELEMETRY

## 2024-09-26 PROCEDURE — 82330 ASSAY OF CALCIUM: CPT

## 2024-09-26 PROCEDURE — 6360000002 HC RX W HCPCS: Performed by: INTERNAL MEDICINE

## 2024-09-26 PROCEDURE — 85014 HEMATOCRIT: CPT

## 2024-09-26 PROCEDURE — 73718 MRI LOWER EXTREMITY W/O DYE: CPT

## 2024-09-26 PROCEDURE — 6360000004 HC RX CONTRAST MEDICATION: Performed by: INTERNAL MEDICINE

## 2024-09-26 PROCEDURE — 83605 ASSAY OF LACTIC ACID: CPT

## 2024-09-26 PROCEDURE — 84295 ASSAY OF SERUM SODIUM: CPT

## 2024-09-26 PROCEDURE — 96365 THER/PROPH/DIAG IV INF INIT: CPT

## 2024-09-26 PROCEDURE — 82803 BLOOD GASES ANY COMBINATION: CPT

## 2024-09-26 PROCEDURE — 36415 COLL VENOUS BLD VENIPUNCTURE: CPT

## 2024-09-26 PROCEDURE — 82947 ASSAY GLUCOSE BLOOD QUANT: CPT

## 2024-09-26 PROCEDURE — 2700000000 HC OXYGEN THERAPY PER DAY

## 2024-09-26 PROCEDURE — 71275 CT ANGIOGRAPHY CHEST: CPT

## 2024-09-26 RX ORDER — AMLODIPINE BESYLATE 5 MG/1
10 TABLET ORAL DAILY
Status: DISCONTINUED | OUTPATIENT
Start: 2024-09-26 | End: 2024-10-01 | Stop reason: HOSPADM

## 2024-09-26 RX ORDER — IOPAMIDOL 755 MG/ML
75 INJECTION, SOLUTION INTRAVASCULAR
Status: COMPLETED | OUTPATIENT
Start: 2024-09-26 | End: 2024-09-26

## 2024-09-26 RX ADMIN — HEPARIN SODIUM 5000 UNITS: 5000 INJECTION INTRAVENOUS; SUBCUTANEOUS at 20:37

## 2024-09-26 RX ADMIN — HEPARIN SODIUM 5000 UNITS: 5000 INJECTION INTRAVENOUS; SUBCUTANEOUS at 13:05

## 2024-09-26 RX ADMIN — SODIUM CHLORIDE, PRESERVATIVE FREE 10 ML: 5 INJECTION INTRAVENOUS at 10:05

## 2024-09-26 RX ADMIN — SEVELAMER CARBONATE 800 MG: 800 TABLET, FILM COATED ORAL at 17:43

## 2024-09-26 RX ADMIN — AMLODIPINE BESYLATE 10 MG: 5 TABLET ORAL at 10:04

## 2024-09-26 RX ADMIN — LABETALOL HYDROCHLORIDE 200 MG: 200 TABLET, FILM COATED ORAL at 20:37

## 2024-09-26 RX ADMIN — CEFEPIME 1000 MG: 1 INJECTION, POWDER, FOR SOLUTION INTRAMUSCULAR; INTRAVENOUS at 13:05

## 2024-09-26 RX ADMIN — HYDRALAZINE HYDROCHLORIDE 100 MG: 50 TABLET ORAL at 17:43

## 2024-09-26 RX ADMIN — ONDANSETRON 4 MG: 2 INJECTION INTRAMUSCULAR; INTRAVENOUS at 17:41

## 2024-09-26 RX ADMIN — CINACALCET HYDROCHLORIDE 90 MG: 30 TABLET, FILM COATED ORAL at 10:05

## 2024-09-26 RX ADMIN — CALCITRIOL 0.5 MCG: 0.25 CAPSULE ORAL at 10:04

## 2024-09-26 RX ADMIN — CEFEPIME 1000 MG: 1 INJECTION, POWDER, FOR SOLUTION INTRAMUSCULAR; INTRAVENOUS at 01:32

## 2024-09-26 RX ADMIN — HEPARIN SODIUM 5000 UNITS: 5000 INJECTION INTRAVENOUS; SUBCUTANEOUS at 07:02

## 2024-09-26 RX ADMIN — CYANOCOBALAMIN TAB 1000 MCG 1000 MCG: 1000 TAB at 10:04

## 2024-09-26 RX ADMIN — IOPAMIDOL 75 ML: 755 INJECTION, SOLUTION INTRAVENOUS at 02:29

## 2024-09-26 RX ADMIN — HYDRALAZINE HYDROCHLORIDE 100 MG: 50 TABLET ORAL at 06:59

## 2024-09-26 RX ADMIN — LABETALOL HYDROCHLORIDE 200 MG: 200 TABLET, FILM COATED ORAL at 10:04

## 2024-09-26 RX ADMIN — HYDRALAZINE HYDROCHLORIDE 100 MG: 50 TABLET ORAL at 20:37

## 2024-09-26 ASSESSMENT — PAIN SCALES - GENERAL
PAINLEVEL_OUTOF10: 0

## 2024-09-26 NOTE — CARE COORDINATION
09/26/24 0845   Service Assessment   Patient Orientation Alert and Oriented   Cognition Alert   History Provided By Patient   Primary Caregiver Self   Accompanied By/Relationship N/A   Support Systems Family Members   PCP Verified by CM Yes   Last Visit to PCP Within last 6 months   Prior Functional Level Independent in ADLs/IADLs   Current Functional Level Independent in ADLs/IADLs   Can patient return to prior living arrangement Yes   Ability to make needs known: Good   Would you like for me to discuss the discharge plan with any other family members/significant others, and if so, who? Yes   Financial Resources Medicare;Medicaid   Community Resources Other (Comment)  (Dialysis)   Social/Functional History   Lives With Alone   Type of Home Apartment   Home Equipment Rollator  (Rt BKA uses a prosthetic)   Receives Help From Family   ADL Assistance Independent   Homemaking Assistance Independent   Homemaking Responsibilities Yes   Ambulation Assistance Independent   Transfer Assistance Independent   Active  Yes   Occupation On disability   Discharge Planning   Type of Residence Apartment   Living Arrangements Alone   Current Services Prior To Admission Home Care   Potential Assistance Needed Home Care   DME Ordered? No   Potential Assistance Purchasing Medications No   Type of Home Care Services None   Patient expects to be discharged to: House   History of falls? 0   Services At/After Discharge   Transition of Care Consult (CM Consult) Home Health;Discharge Planning   Services At/After Discharge Home Health   Mode of Transport at Discharge Other (see comment)  (Family)   Confirm Follow Up Transport Self   Condition of Participation: Discharge Planning   The Plan for Transition of Care is related to the following treatment goals: The patient states at the time of DC his plan is to DC to home when stable   The Patient and/or Patient Representative was provided with a Choice of Provider? Patient   The Patient

## 2024-09-26 NOTE — PLAN OF CARE
Problem: Discharge Planning  Goal: Discharge to home or other facility with appropriate resources  Outcome: Progressing     Problem: Safety - Adult  Goal: Free from fall injury  Outcome: Progressing     Problem: Chronic Conditions and Co-morbidities  Goal: Patient's chronic conditions and co-morbidity symptoms are monitored and maintained or improved  Outcome: Progressing     Problem: Discharge Planning  Goal: Discharge to home or other facility with appropriate resources  9/26/2024 1820 by Cecy Nelson RN  Outcome: Progressing  9/26/2024 1819 by Cecy Nelson RN  Outcome: Progressing     Problem: Safety - Adult  Goal: Free from fall injury  9/26/2024 1820 by Cecy Nelson RN  Outcome: Progressing  9/26/2024 1819 by Ceyc Nelson RN  Outcome: Progressing     Problem: Chronic Conditions and Co-morbidities  Goal: Patient's chronic conditions and co-morbidity symptoms are monitored and maintained or improved  9/26/2024 1820 by Cecy Nelson RN  Outcome: Progressing  Flowsheets (Taken 9/26/2024 1820)  Care Plan - Patient's Chronic Conditions and Co-Morbidity Symptoms are Monitored and Maintained or Improved:   Monitor and assess patient's chronic conditions and comorbid symptoms for stability, deterioration, or improvement   Collaborate with multidisciplinary team to address chronic and comorbid conditions and prevent exacerbation or deterioration   Update acute care plan with appropriate goals if chronic or comorbid symptoms are exacerbated and prevent overall improvement and discharge  9/26/2024 1819 by Cecy Nelson RN  Outcome: Progressing

## 2024-09-26 NOTE — ED PROVIDER NOTES
Outpatient Physical Therapy Ortho Treatment Note  [facility]     Patient Name: Carla Best  : 1942  MRN: [id number]  Today's Date: 2017      Visit Date: 2017     Subjective Improvement: 0%  Attendance:  3/3 (medicare)  Next MD Visit : PRN  Recert Date:  17      Therapy Diagnosis:  R hip pain secondary to posterior rotation of hip and concordant muscle shortening        Visit Dx:    ICD-10-CM ICD-9-CM   1. Hip joint painful on movement, unspecified laterality M25.559 719.45       Patient Active Problem List   Diagnosis   • Osteopenia   • Hypothyroidism due to Hashimoto's thyroiditis   • Vitamin D deficiency   • Keratoconjunctivitis sicca   • Long term use of drug   • Cataract   • Primary osteoarthritis of right knee   • B12 deficiency        Past Medical History:   Diagnosis Date   • Chest pain    • Fibrocystic disease of breast    • Hashimoto's thyroiditis    • History of screening mammography 2014    SCREENING MAMMOGRAPHY DIGITAL  (Medicare) (1)    • Hyperlipidemia    • Hypothyroidism due to Hashimoto's thyroiditis 12/3/2016   • Keratoconjunctivitis sicca    • Nuclear senile cataract    • On long term drug therapy    • Osteoarthritis    • Osteopenia    • Osteopenia 12/3/2016   • Photopsia     Right   • Postmenopausal bleeding    • Sinus tachycardia    • Sjogren's syndrome    • Vitamin D deficiency    • Vitamin D deficiency 12/3/2016   • Vitreous detachment of right eye         Past Surgical History:   Procedure Laterality Date   • CHOLECYSTECTOMY  1997    with operative cholangiogram. Chronic cholecystitis & cholelithiasis. HX of passed common duct stone   • CYSTOSCOPY  1962    urethral dilatation.Recurrent pyelonephritis. urethritis   • ENDOSCOPY N/A 2017    Procedure: ESOPHAGOGASTRODUODENOSCOPY;  Surgeon: Cisco Mason DO;  Location: [facility] ENDOSCOPY;  Service:    • ENDOSCOPY AND COLONOSCOPY  1985    Normal colon to left transverse colon  "  • GALLBLADDER SURGERY     • PAP SMEAR  2009   • TONSILLECTOMY  1950   • VAGINAL DELIVERY      x3             PT Ortho       08/14/17 1100    Precautions and Contraindications    Precautions/Limitations fall precautions  -    Precautions fibromyalgia  -    Posture/Observations    Posture/Observations Comments R ant torsion; sacrum deep on R; Reg long; antalgic gait w/ SPC; TTP right adductor; hip flexor area;  -      User Key  (r) = Recorded By, (t) = Taken By, (c) = Cosigned By    Initials Name Provider Type    KALPANA Tsai PTA Physical Therapy Assistant                            PT Assessment/Plan       08/14/17 1100       PT Assessment    Assessment Comments alignment not corrected with ME to correct leg length; sacrum level but R leg continued to be long;  place small heel lift in right shoe on level 2 and helped correct gait and mor upright posture; Pt instructed to take it out if pain increased but to montior and see if it helps improve her hip. Decrease treatment this date secondary to increase pain after last visit.   -     PT Plan    PT Frequency 2x/week  -     Predicted Duration of Therapy Intervention (days/wks) 6 weeks  -     PT Plan Comments Recheck scheduled next week.  Monitor heel lift; continue with manual as needed and beneficial.   -       User Key  (r) = Recorded By, (t) = Taken By, (c) = Cosigned By    Initials Name Provider Type    KALPANA Tsai PTA Physical Therapy Assistant                Modalities       08/14/17 1100          Moist Heat    Location pt to use heat at home  -        User Key  (r) = Recorded By, (t) = Taken By, (c) = Cosigned By    Initials Name Provider Type    KALPANA Tsai PTA Physical Therapy Assistant                Exercises       08/14/17 1100          Subjective Comments    Subjective Comments Pt reports that she had increased pain over the weekend. \"cupping\" left brusing and thinks we may have done to much of it.  Using heat at home and doing " "exercises. Pain more intense when having to get up and move on it  -KH      Subjective Pain    Able to rate subjective pain? yes  -KH      Pre-Treatment Pain Level 2  -KH      Post-Treatment Pain Level 2  -KH      Exercise 1    Exercise Name 1 see manual  -KH      Time (Minutes) 1 10'  -KH      Exercise 2    Exercise Name 2 quad sets over sm bolster  -KH      Sets 2 2  -KH      Reps 2 10  -KH      Exercise 3    Exercise Name 3 SAQ over sm bolster  -KH      Sets 3 2  -KH      Reps 3 10  -KH      Exercise 4    Exercise Name 4 manual prone quad stretch  -KH      Sets 4 3  -KH      Time (Seconds) 4 30\"  -KH      Exercise 5    Exercise Name 5 supine R pirformis/adductor stretch (push not pull)  -KH      Sets 5 3  -KH      Time (Seconds) 5 30\"  -KH        User Key  (r) = Recorded By, (t) = Taken By, (c) = Cosigned By    Initials Name Provider Type    KALPANA Tsai PTA Physical Therapy Assistant                        Manual Rx (last 36 hours)      Manual Treatments       08/14/17 1100          Manual Rx 1    Manual Rx 1 Location R hip musculature  -      Manual Rx 1 Type MFR/STM  -KH      Manual Rx 1 Duration 10'  -KH      Manual Rx 2    Manual Rx 2 Location R SI/sacrum  -KH      Manual Rx 2 Type ME   -KH      Manual Rx 2 Duration 5'  -KH        User Key  (r) = Recorded By, (t) = Taken By, (c) = Cosigned By    Initials Name Provider Type    KALPANA Tsai PTA Physical Therapy Assistant                PT OP Goals       08/14/17 1100       PT Short Term Goals    STG Date to Achieve 08/23/17  -     STG 1 Pt pain level in R hip will be no gretaer than 3/10 on the VAS at all times  -     STG 1 Progress Progressing  -     STG 2 Pt will have 4/5 or greater strength in R LE in all regards  -     STG 2 Progress Progressing  -     STG 3 Pt will be able to ambulate 400' or gretaer intervals without exacerbation of hip pain symptoms  -     STG 3 Progress Progressing  -     STG 4 Pt will have 15 point improvement in " LEFS score  -     STG 4 Progress Progressing  -     Long Term Goals    LTG Date to Achieve 09/13/17  -     LTG 1 Pt will have R hip pain no greater than 1/10 on the VAS at all times.  -     LTG 1 Progress Progressing  -     LTG 2 Pt will have 4+/5 strength in R LE in all regards  -     LTG 2 Progress Progressing  -     LTG 3 Pt will be able to ambulate 900' or gretaer with least restrictive AD without exacervation or R hip pain symptoms.  -     LTG 3 Progress Progressing  -     LTG 4 Pt will have 25 point or gretaer improvement in LEFS score  -     LTG 4 Progress Progressing  -     Time Calculation    PT Goal Re-Cert Due Date 08/23/17  -       User Key  (r) = Recorded By, (t) = Taken By, (c) = Cosigned By    Initials Name Provider Type    KALPANA Tsai PTA Physical Therapy Assistant                    Time Calculation:   Start Time: 1100  Stop Time: 1140  Time Calculation (min): 40 min  Total Timed Code Minutes- PT: 40 minute(s)    Therapy Charges for Today     Code Description Service Date Service Provider Modifiers Qty    72451597885 HC PT MANUAL THERAPY EA 15 MIN 8/14/2017 Eula Tsai PTA GP 1    40424849994 HC PT THER PROC EA 15 MIN 8/14/2017 Eula Tsai PTA GP 2                    Eula Tsai PTA  8/14/2017        the inpatient physician specialist for hospitalization.      Critical Care Note        NOTES   :  I have provided a total of 35 minutes of critical time not including time spent on separately documented procedures.  The reason for providing this level of medical care for this critically ill patient was due to a critical illness that impaired one or more vital organ systems such that there was a high probability of imminent or life threatening deterioration in the patients condition. This care involved high complexity decision making to assess, manipulate, and support vital system functions,  lab review, consultations with specialist, family decision- making, bedside attention and documentation. During this entire length of time I was immediately available to the patient    Disposition:  admission    PLAN:  1. Admit     Diagnosis     Clinical Impression:   1. Chest pain, unspecified type    2. Coronary artery disease due to calcified coronary lesion    3. Elevated troponin        I, Florentin Thomas MD am the first provider for this patient and am the attending of record for this patient encounter.    Florentin Thomas MD    Please note that this dictation was completed with Dragon, computer voice recognition software.  Quite often unanticipated grammatical, syntax, homophones, and other interpretive errors are inadvertently transcribed by the computer software.  Please disregard these errors.  Additionally, please excuse any errors that have escaped final proofreading.           Florentin Thomas MD  09/26/24 0002       Florentin Thomas MD  09/26/24 0003

## 2024-09-26 NOTE — H&P
History & Physical    Patient: Evan Huizar MRN: [id number]  CSN: [id number]    YOB: 1970  Age: 54 y.o.  Sex: male      DOA: 9/25/2024    Chief Complaint:   Chief Complaint   Patient presents with    Chest Pain       Active Hospital Problems    Diagnosis Date Noted    Chest pain [R07.9] 09/25/2024          HPI:     Evan Huizar is a 54 y.o.  male whodiana Huizar is a 54 y.o.  male who  hx of esrd on hd on MWF, htn , rt BKA,    HHe also presented hypertensive urgency in Ed, his Bp still over 200 after 20 mg hydralazine  and Labetol given with improvement  Patient developed chest pain during dialysis today and came to ER  for further treatment  After being given multiple meds for high blood pressure in the ER   Patient became increasing lethargic  Patient also with left heel wound doctoring for last 2 weeks not followed by wound care clinic     Past Medical History:   Diagnosis Date    Chronic kidney disease     Diabetes (HCC)     Heart failure (HCC)     Hemodialysis patient (HCC)     Hypertension     Psychiatric illness 4/22/2023    Sickle cell trait (HCC)        Past Surgical History:   Procedure Laterality Date    IR NONTUNNELED VASCULAR CATHETER  6/1/2022    IR NONTUNNELED VASCULAR CATHETER 6/1/2022 [facility]    IR NONTUNNELED VASCULAR CATHETER  6/1/2022    IR NONTUNNELED VASCULAR CATHETER  2/8/2023    IR NONTUNNELED VASCULAR CATHETER 2/8/2023 [facility]    IR THRMB/INFUSION DIAYSIS CIRCUIT  6/3/2022    IR THRMB/INFUSION DIAYSIS CIRCUIT 6/3/2022 [facility]    IR THRMB/INFUSION DIAYSIS CIRCUIT  6/3/2022    IR TUNNELED CATHETER PLACEMENT GREATER THAN 5 YEARS  6/26/2022    IR TUNNELED CATHETER PLACEMENT GREATER THAN 5 YEARS 6/26/2022 [facility]    IR TUNNELED CATHETER PLACEMENT GREATER THAN 5 YEARS  6/26/2022    IR TUNNELED CATHETER PLACEMENT GREATER THAN 5 YEARS  2/7/2023    IR TUNNELED CATHETER PLACEMENT GREATER THAN 5 YEARS 2/7/2023 [facility]    IR TUNNELED

## 2024-09-26 NOTE — ED NOTES
Report called to SANIA Aviles. No further questions at this time, will transport patient to floor.

## 2024-09-26 NOTE — DIALYSIS
Patient refused dialysis. Asked reason for his refusal, pt. Verbalized that   his Regular dialysis days are T,T,S and would like to dialyze on his regular day Tomorrow. Educated on risks of volume overload and possible compromise to his health,  PT verbalized understanding. Dr. Vincent made aware, no new orders, pt. Agree To dialyze tomorrow.

## 2024-09-26 NOTE — CARE COORDINATION
09/26/24 0855   Readmission Assessment   Number of Days since last admission? 8-30 days   Previous Disposition Home with Home Health   Who is being Interviewed Patient   What was the patient's/caregiver's perception as to why they think they needed to return back to the hospital? Did not realize care needs would be so extensive   Did you visit your Primary Care Physician after you left the hospital, before you returned this time? Yes   Did you see a specialist, such as Cardiac, Pulmonary, Orthopedic Physician, etc. after you left the hospital? Yes   Who advised the patient to return to the hospital? Self-referral   Does the patient report anything that got in the way of taking their medications? No   In our efforts to provide the best possible care to you and others like you, can you think of anything that we could have done to help you after you left the hospital the first time, so that you might not have needed to return so soon? Arrange for more help when leaving the hospital;Teach back instructions regarding management of illness;Identify patient's health literacy needs;Discharge instructions that are concise, clear, and non contradictory;Improved written discharge instructions;Education on how to continue taking medications upon discharge;Teaching during hospitalization regarding your illness

## 2024-09-27 LAB
ALBUMIN SERPL-MCNC: 3 G/DL (ref 3.4–5)
ALBUMIN/GLOB SERPL: 0.7 (ref 0.8–1.7)
ALP SERPL-CCNC: 70 U/L (ref 45–117)
ALT SERPL-CCNC: 24 U/L (ref 16–61)
ANION GAP SERPL CALC-SCNC: 14 MMOL/L (ref 3–18)
AST SERPL-CCNC: 9 U/L (ref 10–38)
BILIRUB SERPL-MCNC: 0.4 MG/DL (ref 0.2–1)
BUN SERPL-MCNC: 88 MG/DL (ref 7–18)
BUN/CREAT SERPL: 7 (ref 12–20)
CALCIUM SERPL-MCNC: 7.4 MG/DL (ref 8.5–10.1)
CHLORIDE SERPL-SCNC: 100 MMOL/L (ref 100–111)
CO2 SERPL-SCNC: 20 MMOL/L (ref 21–32)
CREAT SERPL-MCNC: 12.2 MG/DL (ref 0.6–1.3)
ERYTHROCYTE [DISTWIDTH] IN BLOOD BY AUTOMATED COUNT: 22.7 % (ref 11.6–14.5)
GLOBULIN SER CALC-MCNC: 4.4 G/DL (ref 2–4)
GLUCOSE BLD STRIP.AUTO-MCNC: 85 MG/DL (ref 70–110)
GLUCOSE SERPL-MCNC: 89 MG/DL (ref 74–99)
HCT VFR BLD AUTO: 30.5 % (ref 36–48)
HGB BLD-MCNC: 9.9 G/DL (ref 13–16)
MAGNESIUM SERPL-MCNC: 2.5 MG/DL (ref 1.6–2.6)
MCH RBC QN AUTO: 25.7 PG (ref 24–34)
MCHC RBC AUTO-ENTMCNC: 32.5 G/DL (ref 31–37)
MCV RBC AUTO: 79.2 FL (ref 78–100)
NRBC # BLD: 0 K/UL (ref 0–0.01)
NRBC BLD-RTO: 0 PER 100 WBC
PLATELET # BLD AUTO: 109 K/UL (ref 135–420)
POTASSIUM SERPL-SCNC: 4.7 MMOL/L (ref 3.5–5.5)
PROT SERPL-MCNC: 7.4 G/DL (ref 6.4–8.2)
RBC # BLD AUTO: 3.85 M/UL (ref 4.35–5.65)
SODIUM SERPL-SCNC: 134 MMOL/L (ref 136–145)
WBC # BLD AUTO: 5.3 K/UL (ref 4.6–13.2)

## 2024-09-27 PROCEDURE — G0257 UNSCHED DIALYSIS ESRD PT HOS: HCPCS

## 2024-09-27 PROCEDURE — 1100000003 HC PRIVATE W/ TELEMETRY

## 2024-09-27 PROCEDURE — G0378 HOSPITAL OBSERVATION PER HR: HCPCS

## 2024-09-27 PROCEDURE — 96366 THER/PROPH/DIAG IV INF ADDON: CPT

## 2024-09-27 PROCEDURE — 85027 COMPLETE CBC AUTOMATED: CPT

## 2024-09-27 PROCEDURE — 2580000003 HC RX 258: Performed by: INTERNAL MEDICINE

## 2024-09-27 PROCEDURE — 96376 TX/PRO/DX INJ SAME DRUG ADON: CPT

## 2024-09-27 PROCEDURE — 83735 ASSAY OF MAGNESIUM: CPT

## 2024-09-27 PROCEDURE — 36415 COLL VENOUS BLD VENIPUNCTURE: CPT

## 2024-09-27 PROCEDURE — 6370000000 HC RX 637 (ALT 250 FOR IP): Performed by: INTERNAL MEDICINE

## 2024-09-27 PROCEDURE — 96372 THER/PROPH/DIAG INJ SC/IM: CPT

## 2024-09-27 PROCEDURE — 80053 COMPREHEN METABOLIC PANEL: CPT

## 2024-09-27 PROCEDURE — 6360000002 HC RX W HCPCS: Performed by: INTERNAL MEDICINE

## 2024-09-27 PROCEDURE — 90935 HEMODIALYSIS ONE EVALUATION: CPT

## 2024-09-27 PROCEDURE — 82962 GLUCOSE BLOOD TEST: CPT

## 2024-09-27 RX ADMIN — HYDRALAZINE HYDROCHLORIDE 100 MG: 50 TABLET ORAL at 14:32

## 2024-09-27 RX ADMIN — CEFEPIME 1000 MG: 1 INJECTION, POWDER, FOR SOLUTION INTRAMUSCULAR; INTRAVENOUS at 14:39

## 2024-09-27 RX ADMIN — ONDANSETRON 4 MG: 2 INJECTION INTRAMUSCULAR; INTRAVENOUS at 14:31

## 2024-09-27 RX ADMIN — HYDRALAZINE HYDROCHLORIDE 100 MG: 50 TABLET ORAL at 20:17

## 2024-09-27 RX ADMIN — CYANOCOBALAMIN TAB 1000 MCG 1000 MCG: 1000 TAB at 09:00

## 2024-09-27 RX ADMIN — LABETALOL HYDROCHLORIDE 200 MG: 200 TABLET, FILM COATED ORAL at 20:18

## 2024-09-27 RX ADMIN — CINACALCET HYDROCHLORIDE 90 MG: 30 TABLET, FILM COATED ORAL at 09:00

## 2024-09-27 RX ADMIN — HEPARIN SODIUM 5000 UNITS: 5000 INJECTION INTRAVENOUS; SUBCUTANEOUS at 14:33

## 2024-09-27 RX ADMIN — SODIUM CHLORIDE, PRESERVATIVE FREE 10 ML: 5 INJECTION INTRAVENOUS at 09:04

## 2024-09-27 RX ADMIN — AMLODIPINE BESYLATE 10 MG: 5 TABLET ORAL at 14:32

## 2024-09-27 RX ADMIN — HYDRALAZINE HYDROCHLORIDE 100 MG: 50 TABLET ORAL at 05:25

## 2024-09-27 RX ADMIN — HEPARIN SODIUM 5000 UNITS: 5000 INJECTION INTRAVENOUS; SUBCUTANEOUS at 05:25

## 2024-09-27 RX ADMIN — CALCITRIOL 0.5 MCG: 0.25 CAPSULE ORAL at 09:00

## 2024-09-27 RX ADMIN — LABETALOL HYDROCHLORIDE 200 MG: 200 TABLET, FILM COATED ORAL at 14:32

## 2024-09-27 RX ADMIN — ONDANSETRON 4 MG: 2 INJECTION INTRAMUSCULAR; INTRAVENOUS at 00:12

## 2024-09-27 RX ADMIN — SEVELAMER CARBONATE 800 MG: 800 TABLET, FILM COATED ORAL at 17:54

## 2024-09-27 RX ADMIN — HEPARIN SODIUM 5000 UNITS: 5000 INJECTION INTRAVENOUS; SUBCUTANEOUS at 20:18

## 2024-09-27 RX ADMIN — SEVELAMER CARBONATE 800 MG: 800 TABLET, FILM COATED ORAL at 14:42

## 2024-09-27 ASSESSMENT — PAIN SCALES - GENERAL
PAINLEVEL_OUTOF10: 0
PAINLEVEL_OUTOF10: 0

## 2024-09-27 NOTE — FLOWSHEET NOTE
09/26/24 1042   Wound 09/06/24 Heel Left   Date First Assessed/Time First Assessed: 09/06/24 2000   Present on Original Admission: Yes  Location: Heel  Wound Location Orientation: Left   Wound Image    Wound Etiology Diabetic   Dressing Status New dressing applied   Wound Cleansed Wound cleanser   Dressing/Treatment Alginate with Ag;Collagen;Silicone border   Offloading for Diabetic Foot Ulcers Offloading ordered   Wound Length (cm) 2.5 cm   Wound Width (cm) 1.3 cm   Wound Depth (cm) 0.1 cm   Wound Surface Area (cm^2) 3.25 cm^2   Change in Wound Size % (l*w) -16.07   Wound Volume (cm^3) 0.325 cm^3   Wound Healing % 42   Wound Assessment Devitalized tissue;Pink/red   Drainage Amount Small (< 25%)   Drainage Description Serosanguinous   Odor None   Norma-wound Assessment Hyperkeratosis (callous)   Margins Defined edges   Wound Thickness Description not for Pressure Injury Full thickness       
Tested for Integrity Yes   Machine Conductivity 14.   Machine Ph 7.2   Bleach Test (Neg) Yes   Bath Temperature 96.8 °F (36 °C)   Tunneled Hemodialysis Catheter Right Subclavian   Placement Date/Time: 07/21/23 0904   Present on Admission/Arrival: No  Inserted by: neto  Insertion Practices: Chlorohexadine skin antisepsis;Hand hygiene;Maximal barrier precautions;Sterile ultrasound technique;Optimal catheter site selection  ...   Access Status  Accessed   Continued need for line? Yes   Site Assessment Clean, dry & intact   Venous Lumen Status Brisk blood return;Flushed   Arterial Lumen Status Brisk blood return;Flushed   Dressing Type Antimicrobial;Bacteriocidal;Transparent   Date of Last Dressing Change 09/27/24   Dressing Status New dressing applied;Clean, dry & intact   Dressing Intervention New;Dressing changed   Treatment Initiation   Dialyze Hours 4   Treatment  Initiation Universal Precautions maintained;Lines secured to patient;Connections secured;Prime given;Venous Parameters set;Arterial Parameters set;Air foam detector engaged;Dialysate;Saline line double clamped   During Hemodialysis Assessment   Blood Flow Rate (ml/min) 400 ml/min   Arterial Pressure (mmHg) -110 mmHg   Venous Pressure (mmHg) 80   TMP 90      Comments tx started   Access Visible Yes   Ultrafiltration Rate (ml/hr) 880 ml/hr   Ultrafiltration Removed (ml) 0 ml   Dialysis Bath   K+ (Potassium) 2   Ca+ (Calcium) 2.5   Na+ (Sodium) 138   HCO3 (Bicarb) 33   Bicarbonate Concentrate Lot No. [id number]   Acid Concentrate Lot No. [id number]     Post Hd  Primary RN SBAR: Cecy Nelson RN  Comments: none     09/27/24 1338   Treatment   Time Off 1338   Observations & Evaluations   Level of Consciousness 0   Oriented X 4   Heart Rhythm Regular   Respiratory Quality/Effort Unlabored   O2 Device Nasal cannula   Bilateral Breath Sounds Diminished   Skin Color Pale   Skin Condition/Temp Warm   Edema Generalized   Edema Generalized +2   Vital Signs   BP

## 2024-09-27 NOTE — CONSULTS
IP WOUND CONSULT    Evan Huizar  MEDICAL RECORD NUMBER:  [id number]  AGE: 54 y.o.   GENDER: male  : 1970  TODAY'S DATE:  2024    GENERAL     [] Follow-up   [x] New Consult    [x] Present on Admission  [] Hospital Acquired    Evan Huizar is a 54 y.o. male referred by:   [] Physician  [x] Nursing  [] Other:         PAST MEDICAL HISTORY    Past Medical History:   Diagnosis Date    Chronic kidney disease     Diabetes (HCC)     Heart failure (HCC)     Hemodialysis patient (HCC)     Hypertension     Psychiatric illness 2023    Sickle cell trait (HCC)         PAST SURGICAL HISTORY    Past Surgical History:   Procedure Laterality Date    IR NONTUNNELED VASCULAR CATHETER  2022    IR NONTUNNELED VASCULAR CATHETER 2022 [facility]    IR NONTUNNELED VASCULAR CATHETER  2022    IR NONTUNNELED VASCULAR CATHETER  2023    IR NONTUNNELED VASCULAR CATHETER 2023 [facility]    IR THRMB/INFUSION DIAYSIS CIRCUIT  6/3/2022    IR THRMB/INFUSION DIAYSIS CIRCUIT 6/3/2022 [facility]    IR THRMB/INFUSION DIAYSIS CIRCUIT  6/3/2022    IR TUNNELED CATHETER PLACEMENT GREATER THAN 5 YEARS  2022    IR TUNNELED CATHETER PLACEMENT GREATER THAN 5 YEARS 2022 [facility] IR    IR TUNNELED CATHETER PLACEMENT GREATER THAN 5 YEARS  2022    IR TUNNELED CATHETER PLACEMENT GREATER THAN 5 YEARS  2023    IR TUNNELED CATHETER PLACEMENT GREATER THAN 5 YEARS 2023 [facility] RAD [facility]GIO IR    IR TUNNELED CATHETER PLACEMENT GREATER THAN 5 YEARS  2023    IR TUNNELED CATHETER PLACEMENT GREATER THAN 5 YEARS 2023 Mora Chowdary MD [facility] RAD ANGIO IR    ORTHOPEDIC SURGERY      right BKA     XR MIDLINE EQUAL OR GREATER THAN 5 YEARS  2019    XR MIDLINE EQUAL OR GREATER THAN 5 YEARS 2019       FAMILY HISTORY    Family History   Problem Relation Age of Onset    Sickle Cell Anemia Mother     Diabetes Father        SOCIAL HISTORY    Social History     Tobacco Use    Smoking 
9/4/2019    XR MIDLINE EQUAL OR GREATER THAN 5 YEARS 9/4/2019     Family History   Problem Relation Age of Onset    Sickle Cell Anemia Mother     Diabetes Father      Allergies   Allergen Reactions    Vancomycin Other (See Comments)     Generalized desquamation - body-wide; not Red-Man    Penicillins Rash     Current Facility-Administered Medications   Medication Dose Route Frequency Provider Last Rate Last Admin    cefepime (MAXIPIME) 1,000 mg in sodium chloride 0.9 % 50 mL IVPB (mini-bag)  1,000 mg IntraVENous Q12H Jenny Karimi MD   Stopped at 09/26/24 0257    amLODIPine (NORVASC) tablet 10 mg  10 mg Oral Daily Jenny Karimi MD   10 mg at 09/26/24 1004    sodium chloride flush 0.9 % injection 5-40 mL  5-40 mL IntraVENous 2 times per day Jenny Karimi MD   10 mL at 09/26/24 1005    sodium chloride flush 0.9 % injection 5-40 mL  5-40 mL IntraVENous PRN Jenny Karimi MD        0.9 % sodium chloride infusion   IntraVENous PRN Jenny Karimi MD        magnesium sulfate 2000 mg in 50 mL IVPB premix  2,000 mg IntraVENous PRN Jenny Karimi MD        ondansetron (ZOFRAN-ODT) disintegrating tablet 4 mg  4 mg Oral Q8H PRN Jenny Karimi MD        Or    ondansetron (ZOFRAN) injection 4 mg  4 mg IntraVENous Q6H PRN Jenny Karimi MD        polyethylene glycol (GLYCOLAX) packet 17 g  17 g Oral Daily PRN Jenny Karimi MD        acetaminophen (TYLENOL) tablet 650 mg  650 mg Oral Q6H PRN Jenny Karimi MD        Or    acetaminophen (TYLENOL) suppository 650 mg  650 mg Rectal Q6H PRN Jenny Karimi MD        heparin (porcine) injection 5,000 Units  5,000 Units SubCUTAneous 3 times per day Jenny Karimi MD   5,000 Units at 09/26/24 0702    calcitRIOL (ROCALTROL) capsule 0.5 mcg  0.5 mcg Oral Daily Jenny Karimi MD   0.5 mcg at 09/26/24 1004    cinacalcet (SENSIPAR) tablet 90 mg  90 mg Oral Daily Jenny Karimi MD  
05:36 AM    K 4.2 09/26/2024 05:36 AM     09/26/2024 05:36 AM    CO2 23 09/26/2024 05:36 AM    BUN 76 09/26/2024 05:36 AM     Lab Results   Component Value Date/Time    CHOL 115 06/02/2022 09:45 AM    HDL 62 06/02/2022 09:45 AM    LDL 46 06/02/2022 09:45 AM     No components found for: \"GPT\"  Hemoglobin A1C   Date Value Ref Range Status   09/06/2024 4.7 4.2 - 5.6 % Final     Comment:     (NOTE)  HbA1C Interpretive Ranges  <5.7              Normal  5.7 - 6.4         Consider Prediabetes  >6.5              Consider Diabetes         RADIOLOGY:  [unfilled]  [unfilled]      Cardiology Procedures: [unfilled] [unfilled] Cardiolite (Tc-99m Sestamibi) stress test        Impression / Plan:    Patient Active Problem List   Diagnosis    Non-compliance with renal dialysis (HCC)    Iron deficiency anemia    Hx of BKA, right (HCC)    ESRD (end stage renal disease) on dialysis ([facility])    Hypertension    Chronic anemia    Psychiatric illness    Chronic ulcer of left foot with fat layer exposed ([facility])    Chronic diastolic CHF (congestive heart failure) ([facility])    Dyspnea    Infection of hemodialysis tunneled catheter ([facility])    Involuntary jerky movements    Acute respiratory failure with hypoxia    CHF (congestive heart failure), NYHA class I, acute on chronic, diastolic (HCC)    Hypoglycemia    Encephalopathy acute    Volume overload    Abdominal pain    Hypoxia    Diarrhea    Stump pain ([facility])    Hypertensive urgency    Prolonged Q-T interval on ECG    Chest pain       A/P    Chest pain   Hypertensive urgency   Chronic kidney disease on dialysis   Chronic diastolic heart failure   Chronic severe mitral valve calcification and chronic possible healed vegetation versus calcification  Uncontrolled hypertension   Right below-the-knee amputation   Bilateral pleural effusion left more than the right   Minimal troponin elevation without any evidence of ACS and EKG without any new ischemic EKG changes.    Plan.    Echocardiogram done and 
117 U/L    Total Protein 7.4 6.4 - 8.2 g/dL    Albumin 3.0 (L) 3.4 - 5.0 g/dL    Globulin 4.4 (H) 2.0 - 4.0 g/dL    Albumin/Globulin Ratio 0.7 (L) 0.8 - 1.7     POCT Glucose    Collection Time: 09/27/24  9:46 AM   Result Value Ref Range    POC Glucose 85 70 - 110 mg/dL       Impression:   1.  Grade 1 Mckeon diabetic ulcer plantar left heel  2.  Diabetes with peripheral neuropathy bilateral lower extremities  3.    Recommendation:   1.  Continue IV antibiotics as per medicine.  2.  Continue wound care as needed.  3.  Sharp selective debridement left plantar heel ulcer using a #10 blade, deep to the subcutaneous tissue, to remove all necrotic and fibrotic tissue down to healthy bleeding base.  Apply collagen with protective adhesive dressing left plantar heel.  Will follow this patient as needed.

## 2024-09-27 NOTE — PLAN OF CARE
Problem: Discharge Planning  Goal: Discharge to home or other facility with appropriate resources  9/27/2024 0141 by Nakia Troncoso RN  Outcome: Progressing  Flowsheets (Taken 9/26/2024 2007)  Discharge to home or other facility with appropriate resources:   Arrange for needed discharge resources and transportation as appropriate   Identify barriers to discharge with patient and caregiver  9/26/2024 1820 by Cecy Nelson RN  Outcome: Progressing  9/26/2024 1819 by Cecy Nelson RN  Outcome: Progressing     Problem: Safety - Adult  Goal: Free from fall injury  9/27/2024 0141 by Nakia Troncoso RN  Outcome: Progressing  9/26/2024 1820 by Cecy Nelson RN  Outcome: Progressing  9/26/2024 1819 by Cecy Nelson RN  Outcome: Progressing     Problem: Chronic Conditions and Co-morbidities  Goal: Patient's chronic conditions and co-morbidity symptoms are monitored and maintained or improved  9/27/2024 0141 by Nakia Troncoso RN  Outcome: Progressing  Flowsheets (Taken 9/26/2024 2007)  Care Plan - Patient's Chronic Conditions and Co-Morbidity Symptoms are Monitored and Maintained or Improved:   Monitor and assess patient's chronic conditions and comorbid symptoms for stability, deterioration, or improvement   Collaborate with multidisciplinary team to address chronic and comorbid conditions and prevent exacerbation or deterioration  9/26/2024 1820 by Cecy Nelson RN  Outcome: Progressing  Flowsheets (Taken 9/26/2024 1820)  Care Plan - Patient's Chronic Conditions and Co-Morbidity Symptoms are Monitored and Maintained or Improved:   Monitor and assess patient's chronic conditions and comorbid symptoms for stability, deterioration, or improvement   Collaborate with multidisciplinary team to address chronic and comorbid conditions and prevent exacerbation or deterioration   Update acute care plan with appropriate goals if chronic or comorbid symptoms are exacerbated and prevent overall improvement and discharge  9/26/2024

## 2024-09-27 NOTE — PLAN OF CARE
Problem: Chronic Conditions and Co-morbidities  Goal: Patient's chronic conditions and co-morbidity symptoms are monitored and maintained or improved  9/27/2024 0946 by Essence Gunderson, RN  Outcome: Progressing  9/27/2024 0141 by Nakia Troncoso, RN  Outcome: Progressing  Flowsheets (Taken 9/26/2024 2007)  Care Plan - Patient's Chronic Conditions and Co-Morbidity Symptoms are Monitored and Maintained or Improved:   Monitor and assess patient's chronic conditions and comorbid symptoms for stability, deterioration, or improvement   Collaborate with multidisciplinary team to address chronic and comorbid conditions and prevent exacerbation or deterioration   Pt to do dialysis today

## 2024-09-28 LAB
ALBUMIN SERPL-MCNC: 3 G/DL (ref 3.4–5)
ALBUMIN/GLOB SERPL: 0.6 (ref 0.8–1.7)
ALP SERPL-CCNC: 76 U/L (ref 45–117)
ALT SERPL-CCNC: 22 U/L (ref 16–61)
ANION GAP SERPL CALC-SCNC: 9 MMOL/L (ref 3–18)
AST SERPL-CCNC: 15 U/L (ref 10–38)
BILIRUB SERPL-MCNC: 0.4 MG/DL (ref 0.2–1)
BUN SERPL-MCNC: 37 MG/DL (ref 7–18)
BUN/CREAT SERPL: 5 (ref 12–20)
CALCIUM SERPL-MCNC: 8.1 MG/DL (ref 8.5–10.1)
CHLORIDE SERPL-SCNC: 99 MMOL/L (ref 100–111)
CO2 SERPL-SCNC: 25 MMOL/L (ref 21–32)
CREAT SERPL-MCNC: 7.28 MG/DL (ref 0.6–1.3)
ERYTHROCYTE [DISTWIDTH] IN BLOOD BY AUTOMATED COUNT: 21.8 % (ref 11.6–14.5)
GLOBULIN SER CALC-MCNC: 4.8 G/DL (ref 2–4)
GLUCOSE SERPL-MCNC: 79 MG/DL (ref 74–99)
HCT VFR BLD AUTO: 30.8 % (ref 36–48)
HGB BLD-MCNC: 10.3 G/DL (ref 13–16)
MAGNESIUM SERPL-MCNC: 2.4 MG/DL (ref 1.6–2.6)
MCH RBC QN AUTO: 26.2 PG (ref 24–34)
MCHC RBC AUTO-ENTMCNC: 33.4 G/DL (ref 31–37)
MCV RBC AUTO: 78.4 FL (ref 78–100)
NRBC # BLD: 0 K/UL (ref 0–0.01)
NRBC BLD-RTO: 0 PER 100 WBC
PLATELET # BLD AUTO: 126 K/UL (ref 135–420)
POTASSIUM SERPL-SCNC: 4.8 MMOL/L (ref 3.5–5.5)
PROT SERPL-MCNC: 7.8 G/DL (ref 6.4–8.2)
RBC # BLD AUTO: 3.93 M/UL (ref 4.35–5.65)
SODIUM SERPL-SCNC: 133 MMOL/L (ref 136–145)
WBC # BLD AUTO: 4.4 K/UL (ref 4.6–13.2)

## 2024-09-28 PROCEDURE — 96372 THER/PROPH/DIAG INJ SC/IM: CPT

## 2024-09-28 PROCEDURE — 6370000000 HC RX 637 (ALT 250 FOR IP): Performed by: INTERNAL MEDICINE

## 2024-09-28 PROCEDURE — 96366 THER/PROPH/DIAG IV INF ADDON: CPT

## 2024-09-28 PROCEDURE — 80053 COMPREHEN METABOLIC PANEL: CPT

## 2024-09-28 PROCEDURE — 6360000002 HC RX W HCPCS: Performed by: INTERNAL MEDICINE

## 2024-09-28 PROCEDURE — 36415 COLL VENOUS BLD VENIPUNCTURE: CPT

## 2024-09-28 PROCEDURE — 2700000000 HC OXYGEN THERAPY PER DAY

## 2024-09-28 PROCEDURE — 96376 TX/PRO/DX INJ SAME DRUG ADON: CPT

## 2024-09-28 PROCEDURE — 85027 COMPLETE CBC AUTOMATED: CPT

## 2024-09-28 PROCEDURE — 83735 ASSAY OF MAGNESIUM: CPT

## 2024-09-28 PROCEDURE — G0378 HOSPITAL OBSERVATION PER HR: HCPCS

## 2024-09-28 PROCEDURE — 2580000003 HC RX 258: Performed by: INTERNAL MEDICINE

## 2024-09-28 PROCEDURE — 1100000003 HC PRIVATE W/ TELEMETRY

## 2024-09-28 RX ADMIN — ACETAMINOPHEN 650 MG: 325 TABLET ORAL at 05:11

## 2024-09-28 RX ADMIN — CINACALCET HYDROCHLORIDE 90 MG: 30 TABLET, FILM COATED ORAL at 08:26

## 2024-09-28 RX ADMIN — SODIUM CHLORIDE, PRESERVATIVE FREE 10 ML: 5 INJECTION INTRAVENOUS at 08:27

## 2024-09-28 RX ADMIN — AMLODIPINE BESYLATE 10 MG: 5 TABLET ORAL at 08:26

## 2024-09-28 RX ADMIN — SEVELAMER CARBONATE 800 MG: 800 TABLET, FILM COATED ORAL at 12:22

## 2024-09-28 RX ADMIN — ONDANSETRON 4 MG: 2 INJECTION INTRAMUSCULAR; INTRAVENOUS at 12:49

## 2024-09-28 RX ADMIN — HEPARIN SODIUM 5000 UNITS: 5000 INJECTION INTRAVENOUS; SUBCUTANEOUS at 14:19

## 2024-09-28 RX ADMIN — SODIUM CHLORIDE, PRESERVATIVE FREE 10 ML: 5 INJECTION INTRAVENOUS at 21:37

## 2024-09-28 RX ADMIN — LABETALOL HYDROCHLORIDE 200 MG: 200 TABLET, FILM COATED ORAL at 21:38

## 2024-09-28 RX ADMIN — HYDRALAZINE HYDROCHLORIDE 100 MG: 50 TABLET ORAL at 21:38

## 2024-09-28 RX ADMIN — SEVELAMER CARBONATE 800 MG: 800 TABLET, FILM COATED ORAL at 16:54

## 2024-09-28 RX ADMIN — CYANOCOBALAMIN TAB 1000 MCG 1000 MCG: 1000 TAB at 08:26

## 2024-09-28 RX ADMIN — CEFEPIME 1000 MG: 1 INJECTION, POWDER, FOR SOLUTION INTRAMUSCULAR; INTRAVENOUS at 12:22

## 2024-09-28 RX ADMIN — HYDRALAZINE HYDROCHLORIDE 100 MG: 50 TABLET ORAL at 12:48

## 2024-09-28 RX ADMIN — HYDRALAZINE HYDROCHLORIDE 100 MG: 50 TABLET ORAL at 05:12

## 2024-09-28 RX ADMIN — CALCITRIOL 0.5 MCG: 0.25 CAPSULE ORAL at 08:26

## 2024-09-28 RX ADMIN — SEVELAMER CARBONATE 800 MG: 800 TABLET, FILM COATED ORAL at 08:26

## 2024-09-28 RX ADMIN — HEPARIN SODIUM 5000 UNITS: 5000 INJECTION INTRAVENOUS; SUBCUTANEOUS at 05:11

## 2024-09-28 RX ADMIN — LABETALOL HYDROCHLORIDE 200 MG: 200 TABLET, FILM COATED ORAL at 08:26

## 2024-09-28 RX ADMIN — HEPARIN SODIUM 5000 UNITS: 5000 INJECTION INTRAVENOUS; SUBCUTANEOUS at 21:37

## 2024-09-28 ASSESSMENT — PAIN SCALES - GENERAL
PAINLEVEL_OUTOF10: 0
PAINLEVEL_OUTOF10: 0
PAINLEVEL_OUTOF10: 3
PAINLEVEL_OUTOF10: 0
PAINLEVEL_OUTOF10: 0
PAINLEVEL_OUTOF10: 1
PAINLEVEL_OUTOF10: 0
PAINLEVEL_OUTOF10: 0

## 2024-09-28 NOTE — PLAN OF CARE
Problem: Discharge Planning  Goal: Discharge to home or other facility with appropriate resources  Outcome: Progressing  Flowsheets (Taken 9/27/2024 2000)  Discharge to home or other facility with appropriate resources:   Identify barriers to discharge with patient and caregiver   Arrange for needed discharge resources and transportation as appropriate     Problem: Safety - Adult  Goal: Free from fall injury  Outcome: Progressing  Flowsheets (Taken 9/28/2024 0200)  Free From Fall Injury:   Instruct family/caregiver on patient safety   Based on caregiver fall risk screen, instruct family/caregiver to ask for assistance with transferring infant if caregiver noted to have fall risk factors     Problem: Chronic Conditions and Co-morbidities  Goal: Patient's chronic conditions and co-morbidity symptoms are monitored and maintained or improved  Outcome: Progressing  Flowsheets (Taken 9/27/2024 2000)  Care Plan - Patient's Chronic Conditions and Co-Morbidity Symptoms are Monitored and Maintained or Improved:   Monitor and assess patient's chronic conditions and comorbid symptoms for stability, deterioration, or improvement   Collaborate with multidisciplinary team to address chronic and comorbid conditions and prevent exacerbation or deterioration     Problem: Pain  Goal: Verbalizes/displays adequate comfort level or baseline comfort level  Outcome: Progressing

## 2024-09-28 NOTE — PLAN OF CARE
Problem: Safety - Adult  Goal: Free from fall injury  9/28/2024 1027 by Chip William, RN  Outcome: Progressing    Problem: Chronic Conditions and Co-morbidities  Goal: Patient's chronic conditions and co-morbidity symptoms are monitored and maintained or improved  9/28/2024 1027 by Chip William, RN  Outcome: Progressing  Flowsheets (Taken 9/28/2024 0800)  Care Plan - Patient's Chronic Conditions and Co-Morbidity Symptoms are Monitored and Maintained or Improved: Monitor and assess patient's chronic conditions and comorbid symptoms for stability, deterioration, or improvement    Problem: Pain  Goal: Verbalizes/displays adequate comfort level or baseline comfort level  9/28/2024 1027 by Chip William, RN  Outcome: Progressing

## 2024-09-29 LAB — GLUCOSE BLD STRIP.AUTO-MCNC: 94 MG/DL (ref 70–110)

## 2024-09-29 PROCEDURE — G0378 HOSPITAL OBSERVATION PER HR: HCPCS

## 2024-09-29 PROCEDURE — 6360000002 HC RX W HCPCS: Performed by: INTERNAL MEDICINE

## 2024-09-29 PROCEDURE — 96372 THER/PROPH/DIAG INJ SC/IM: CPT

## 2024-09-29 PROCEDURE — 1100000003 HC PRIVATE W/ TELEMETRY

## 2024-09-29 PROCEDURE — 6370000000 HC RX 637 (ALT 250 FOR IP): Performed by: INTERNAL MEDICINE

## 2024-09-29 PROCEDURE — 82962 GLUCOSE BLOOD TEST: CPT

## 2024-09-29 PROCEDURE — 96366 THER/PROPH/DIAG IV INF ADDON: CPT

## 2024-09-29 PROCEDURE — 2580000003 HC RX 258: Performed by: INTERNAL MEDICINE

## 2024-09-29 RX ADMIN — SEVELAMER CARBONATE 800 MG: 800 TABLET, FILM COATED ORAL at 10:30

## 2024-09-29 RX ADMIN — HYDRALAZINE HYDROCHLORIDE 100 MG: 50 TABLET ORAL at 21:15

## 2024-09-29 RX ADMIN — CEFEPIME 1000 MG: 1 INJECTION, POWDER, FOR SOLUTION INTRAMUSCULAR; INTRAVENOUS at 13:39

## 2024-09-29 RX ADMIN — HYDRALAZINE HYDROCHLORIDE 100 MG: 50 TABLET ORAL at 13:36

## 2024-09-29 RX ADMIN — CALCITRIOL 0.5 MCG: 0.25 CAPSULE ORAL at 08:24

## 2024-09-29 RX ADMIN — HYDRALAZINE HYDROCHLORIDE 100 MG: 50 TABLET ORAL at 04:40

## 2024-09-29 RX ADMIN — CINACALCET HYDROCHLORIDE 90 MG: 30 TABLET, FILM COATED ORAL at 08:24

## 2024-09-29 RX ADMIN — CYANOCOBALAMIN TAB 1000 MCG 1000 MCG: 1000 TAB at 08:24

## 2024-09-29 RX ADMIN — AMLODIPINE BESYLATE 10 MG: 5 TABLET ORAL at 08:24

## 2024-09-29 RX ADMIN — HEPARIN SODIUM 5000 UNITS: 5000 INJECTION INTRAVENOUS; SUBCUTANEOUS at 13:36

## 2024-09-29 RX ADMIN — LABETALOL HYDROCHLORIDE 200 MG: 200 TABLET, FILM COATED ORAL at 08:24

## 2024-09-29 RX ADMIN — HEPARIN SODIUM 5000 UNITS: 5000 INJECTION INTRAVENOUS; SUBCUTANEOUS at 21:14

## 2024-09-29 RX ADMIN — LABETALOL HYDROCHLORIDE 200 MG: 200 TABLET, FILM COATED ORAL at 21:14

## 2024-09-29 RX ADMIN — SODIUM CHLORIDE, PRESERVATIVE FREE 10 ML: 5 INJECTION INTRAVENOUS at 21:14

## 2024-09-29 RX ADMIN — HEPARIN SODIUM 5000 UNITS: 5000 INJECTION INTRAVENOUS; SUBCUTANEOUS at 04:40

## 2024-09-29 RX ADMIN — SODIUM CHLORIDE, PRESERVATIVE FREE 10 ML: 5 INJECTION INTRAVENOUS at 08:25

## 2024-09-29 ASSESSMENT — PAIN SCALES - GENERAL: PAINLEVEL_OUTOF10: 0

## 2024-09-29 NOTE — PLAN OF CARE
Problem: Discharge Planning  Goal: Discharge to home or other facility with appropriate resources  Outcome: Progressing     Problem: Safety - Adult  Goal: Free from fall injury  9/28/2024 2210 by Tong Pedroza RN  Outcome: Progressing  9/28/2024 1027 by Chip William RN  Outcome: Progressing     Problem: Chronic Conditions and Co-morbidities  Goal: Patient's chronic conditions and co-morbidity symptoms are monitored and maintained or improved  9/28/2024 2210 by Tong Pedroza RN  Outcome: Progressing  9/28/2024 1027 by Chip William RN  Outcome: Progressing  Flowsheets (Taken 9/28/2024 0800)  Care Plan - Patient's Chronic Conditions and Co-Morbidity Symptoms are Monitored and Maintained or Improved: Monitor and assess patient's chronic conditions and comorbid symptoms for stability, deterioration, or improvement     Problem: Pain  Goal: Verbalizes/displays adequate comfort level or baseline comfort level  9/28/2024 2210 by Tong Pedroza RN  Outcome: Progressing  9/28/2024 1027 by Chip William RN  Outcome: Progressing

## 2024-09-29 NOTE — PLAN OF CARE
Problem: Discharge Planning  Goal: Discharge to home or other facility with appropriate resources  9/29/2024 0850 by Manasa Maki RN  Outcome: Progressing  Flowsheets (Taken 9/29/2024 0800)  Discharge to home or other facility with appropriate resources:   Identify barriers to discharge with patient and caregiver   Arrange for needed discharge resources and transportation as appropriate   Identify discharge learning needs (meds, wound care, etc)  9/28/2024 2210 by Tong Pedroza RN  Outcome: Progressing     Problem: Safety - Adult  Goal: Free from fall injury  9/29/2024 0850 by Manasa Maki RN  Outcome: Progressing  9/28/2024 2210 by Tong Pedroza RN  Outcome: Progressing     Problem: Chronic Conditions and Co-morbidities  Goal: Patient's chronic conditions and co-morbidity symptoms are monitored and maintained or improved  9/29/2024 0850 by Manasa Maki RN  Outcome: Progressing  Flowsheets (Taken 9/29/2024 0800)  Care Plan - Patient's Chronic Conditions and Co-Morbidity Symptoms are Monitored and Maintained or Improved:   Monitor and assess patient's chronic conditions and comorbid symptoms for stability, deterioration, or improvement   Collaborate with multidisciplinary team to address chronic and comorbid conditions and prevent exacerbation or deterioration   Update acute care plan with appropriate goals if chronic or comorbid symptoms are exacerbated and prevent overall improvement and discharge  9/28/2024 2210 by Tong Pedroza RN  Outcome: Progressing     Problem: Pain  Goal: Verbalizes/displays adequate comfort level or baseline comfort level  9/29/2024 0850 by Manasa Maki RN  Outcome: Progressing  9/28/2024 2210 by Tong Pedroza RN  Outcome: Progressing

## 2024-09-30 ENCOUNTER — APPOINTMENT (OUTPATIENT)
Facility: HOSPITAL | Age: 54
DRG: 304 | End: 2024-09-30
Payer: MEDICARE

## 2024-09-30 LAB
ECHO BSA: 1.95 M2
EKG DIAGNOSIS: NORMAL
GLUCOSE BLD STRIP.AUTO-MCNC: 101 MG/DL (ref 70–110)
NUC STRESS EJECTION FRACTION: 69 %
STRESS BASELINE DIAS BP: 79 MMHG
STRESS BASELINE HR: 75 BPM
STRESS BASELINE SYS BP: 182 MMHG
STRESS ESTIMATED WORKLOAD: 1 METS
STRESS PEAK DIAS BP: 79 MMHG
STRESS PEAK SYS BP: 182 MMHG
STRESS PERCENT HR ACHIEVED: 48 %
STRESS POST PEAK HR: 80 BPM
STRESS RATE PRESSURE PRODUCT: NORMAL BPM*MMHG
STRESS TARGET HR: 168 BPM
TID: 1.14

## 2024-09-30 PROCEDURE — 6360000002 HC RX W HCPCS: Performed by: INTERNAL MEDICINE

## 2024-09-30 PROCEDURE — 1100000003 HC PRIVATE W/ TELEMETRY

## 2024-09-30 PROCEDURE — 96372 THER/PROPH/DIAG INJ SC/IM: CPT

## 2024-09-30 PROCEDURE — 82962 GLUCOSE BLOOD TEST: CPT

## 2024-09-30 PROCEDURE — 6370000000 HC RX 637 (ALT 250 FOR IP): Performed by: INTERNAL MEDICINE

## 2024-09-30 PROCEDURE — 2580000003 HC RX 258: Performed by: INTERNAL MEDICINE

## 2024-09-30 PROCEDURE — 78452 HT MUSCLE IMAGE SPECT MULT: CPT

## 2024-09-30 PROCEDURE — G0378 HOSPITAL OBSERVATION PER HR: HCPCS

## 2024-09-30 PROCEDURE — 96366 THER/PROPH/DIAG IV INF ADDON: CPT

## 2024-09-30 PROCEDURE — 93017 CV STRESS TEST TRACING ONLY: CPT

## 2024-09-30 PROCEDURE — 3430000000 HC RX DIAGNOSTIC RADIOPHARMACEUTICAL: Performed by: INTERNAL MEDICINE

## 2024-09-30 PROCEDURE — A9500 TC99M SESTAMIBI: HCPCS | Performed by: INTERNAL MEDICINE

## 2024-09-30 RX ORDER — REGADENOSON 0.08 MG/ML
0.4 INJECTION, SOLUTION INTRAVENOUS
Status: COMPLETED | OUTPATIENT
Start: 2024-09-30 | End: 2024-09-30

## 2024-09-30 RX ORDER — TETRAKIS(2-METHOXYISOBUTYLISOCYANIDE)COPPER(I) TETRAFLUOROBORATE 1 MG/ML
33.3 INJECTION, POWDER, LYOPHILIZED, FOR SOLUTION INTRAVENOUS
Status: COMPLETED | OUTPATIENT
Start: 2024-09-30 | End: 2024-09-30

## 2024-09-30 RX ORDER — TETRAKIS(2-METHOXYISOBUTYLISOCYANIDE)COPPER(I) TETRAFLUOROBORATE 1 MG/ML
12 INJECTION, POWDER, LYOPHILIZED, FOR SOLUTION INTRAVENOUS
Status: COMPLETED | OUTPATIENT
Start: 2024-09-30 | End: 2024-09-30

## 2024-09-30 RX ADMIN — TETRAKIS(2-METHOXYISOBUTYLISOCYANIDE)COPPER(I) TETRAFLUOROBORATE 33.3 MILLICURIE: 1 INJECTION, POWDER, LYOPHILIZED, FOR SOLUTION INTRAVENOUS at 12:02

## 2024-09-30 RX ADMIN — AMLODIPINE BESYLATE 10 MG: 5 TABLET ORAL at 15:38

## 2024-09-30 RX ADMIN — SODIUM CHLORIDE, PRESERVATIVE FREE 10 ML: 5 INJECTION INTRAVENOUS at 18:12

## 2024-09-30 RX ADMIN — REGADENOSON 0.4 MG: 0.08 INJECTION, SOLUTION INTRAVENOUS at 12:02

## 2024-09-30 RX ADMIN — HYDRALAZINE HYDROCHLORIDE 100 MG: 50 TABLET ORAL at 20:49

## 2024-09-30 RX ADMIN — SEVELAMER CARBONATE 800 MG: 800 TABLET, FILM COATED ORAL at 18:02

## 2024-09-30 RX ADMIN — SODIUM CHLORIDE, PRESERVATIVE FREE 10 ML: 5 INJECTION INTRAVENOUS at 20:50

## 2024-09-30 RX ADMIN — HYDRALAZINE HYDROCHLORIDE 100 MG: 50 TABLET ORAL at 15:38

## 2024-09-30 RX ADMIN — CEFEPIME 1000 MG: 1 INJECTION, POWDER, FOR SOLUTION INTRAMUSCULAR; INTRAVENOUS at 15:45

## 2024-09-30 RX ADMIN — CALCITRIOL 0.5 MCG: 0.25 CAPSULE ORAL at 18:12

## 2024-09-30 RX ADMIN — SEVELAMER CARBONATE 800 MG: 800 TABLET, FILM COATED ORAL at 12:53

## 2024-09-30 RX ADMIN — TETRAKIS(2-METHOXYISOBUTYLISOCYANIDE)COPPER(I) TETRAFLUOROBORATE 12 MILLICURIE: 1 INJECTION, POWDER, LYOPHILIZED, FOR SOLUTION INTRAVENOUS at 08:45

## 2024-09-30 RX ADMIN — CYANOCOBALAMIN TAB 1000 MCG 1000 MCG: 1000 TAB at 15:38

## 2024-09-30 RX ADMIN — LABETALOL HYDROCHLORIDE 10 MG: 5 INJECTION, SOLUTION INTRAVENOUS at 16:43

## 2024-09-30 RX ADMIN — HYDRALAZINE HYDROCHLORIDE 100 MG: 50 TABLET ORAL at 06:48

## 2024-09-30 RX ADMIN — HEPARIN SODIUM 5000 UNITS: 5000 INJECTION INTRAVENOUS; SUBCUTANEOUS at 18:02

## 2024-09-30 RX ADMIN — HEPARIN SODIUM 5000 UNITS: 5000 INJECTION INTRAVENOUS; SUBCUTANEOUS at 20:50

## 2024-09-30 RX ADMIN — CINACALCET HYDROCHLORIDE 90 MG: 30 TABLET, FILM COATED ORAL at 18:11

## 2024-09-30 RX ADMIN — HEPARIN SODIUM 5000 UNITS: 5000 INJECTION INTRAVENOUS; SUBCUTANEOUS at 06:48

## 2024-09-30 ASSESSMENT — PAIN SCALES - GENERAL: PAINLEVEL_OUTOF10: 0

## 2024-09-30 NOTE — PLAN OF CARE
Problem: Discharge Planning  Goal: Discharge to home or other facility with appropriate resources  9/29/2024 2125 by Tong Pedroza RN  Outcome: Progressing  9/29/2024 0850 by Manasa Maki RN  Outcome: Progressing  Flowsheets (Taken 9/29/2024 0800)  Discharge to home or other facility with appropriate resources:   Identify barriers to discharge with patient and caregiver   Arrange for needed discharge resources and transportation as appropriate   Identify discharge learning needs (meds, wound care, etc)     Problem: Safety - Adult  Goal: Free from fall injury  9/29/2024 2125 by Tong Pedroza RN  Outcome: Progressing  9/29/2024 0850 by Manasa Maki RN  Outcome: Progressing     Problem: Chronic Conditions and Co-morbidities  Goal: Patient's chronic conditions and co-morbidity symptoms are monitored and maintained or improved  9/29/2024 2125 by Tong Pedroza RN  Outcome: Progressing  9/29/2024 0850 by Manasa Maki RN  Outcome: Progressing  Flowsheets (Taken 9/29/2024 0800)  Care Plan - Patient's Chronic Conditions and Co-Morbidity Symptoms are Monitored and Maintained or Improved:   Monitor and assess patient's chronic conditions and comorbid symptoms for stability, deterioration, or improvement   Collaborate with multidisciplinary team to address chronic and comorbid conditions and prevent exacerbation or deterioration   Update acute care plan with appropriate goals if chronic or comorbid symptoms are exacerbated and prevent overall improvement and discharge     Problem: Pain  Goal: Verbalizes/displays adequate comfort level or baseline comfort level  9/29/2024 2125 by Tong Pedroza RN  Outcome: Progressing  9/29/2024 0850 by Manasa Maki RN  Outcome: Progressing

## 2024-09-30 NOTE — DIALYSIS
Returned to the room to dialyze patient, patient refused and said he in not   Feeling well and does not want dialysis today. Dr. Vincent notified, who   encourage and offer for him to get at least 3hrs dialysis, but patient   still refused. Educated pt. On pros and cons of missing dialysis, including   death, pt. Verbalized understanding.

## 2024-10-01 VITALS
HEART RATE: 74 BPM | DIASTOLIC BLOOD PRESSURE: 80 MMHG | OXYGEN SATURATION: 94 % | BODY MASS INDEX: 29.87 KG/M2 | HEIGHT: 66 IN | SYSTOLIC BLOOD PRESSURE: 144 MMHG | RESPIRATION RATE: 18 BRPM | TEMPERATURE: 97.9 F | WEIGHT: 185.85 LBS

## 2024-10-01 PROCEDURE — 96372 THER/PROPH/DIAG INJ SC/IM: CPT

## 2024-10-01 PROCEDURE — 2700000000 HC OXYGEN THERAPY PER DAY

## 2024-10-01 PROCEDURE — 6360000002 HC RX W HCPCS: Performed by: INTERNAL MEDICINE

## 2024-10-01 PROCEDURE — 90935 HEMODIALYSIS ONE EVALUATION: CPT

## 2024-10-01 PROCEDURE — 6370000000 HC RX 637 (ALT 250 FOR IP): Performed by: INTERNAL MEDICINE

## 2024-10-01 PROCEDURE — G0257 UNSCHED DIALYSIS ESRD PT HOS: HCPCS

## 2024-10-01 PROCEDURE — 2580000003 HC RX 258: Performed by: INTERNAL MEDICINE

## 2024-10-01 PROCEDURE — G0378 HOSPITAL OBSERVATION PER HR: HCPCS

## 2024-10-01 RX ADMIN — LABETALOL HYDROCHLORIDE 200 MG: 200 TABLET, FILM COATED ORAL at 08:50

## 2024-10-01 RX ADMIN — HYDRALAZINE HYDROCHLORIDE 100 MG: 50 TABLET ORAL at 06:13

## 2024-10-01 RX ADMIN — SEVELAMER CARBONATE 800 MG: 800 TABLET, FILM COATED ORAL at 08:50

## 2024-10-01 RX ADMIN — SODIUM CHLORIDE, PRESERVATIVE FREE 10 ML: 5 INJECTION INTRAVENOUS at 08:51

## 2024-10-01 RX ADMIN — CINACALCET HYDROCHLORIDE 90 MG: 30 TABLET, FILM COATED ORAL at 08:50

## 2024-10-01 RX ADMIN — CALCITRIOL 0.5 MCG: 0.25 CAPSULE ORAL at 08:50

## 2024-10-01 RX ADMIN — AMLODIPINE BESYLATE 10 MG: 5 TABLET ORAL at 08:50

## 2024-10-01 RX ADMIN — CYANOCOBALAMIN TAB 1000 MCG 1000 MCG: 1000 TAB at 08:50

## 2024-10-01 RX ADMIN — HEPARIN SODIUM 5000 UNITS: 5000 INJECTION INTRAVENOUS; SUBCUTANEOUS at 06:13

## 2024-10-01 RX ADMIN — SEVELAMER CARBONATE 800 MG: 800 TABLET, FILM COATED ORAL at 17:14

## 2024-10-01 ASSESSMENT — PAIN SCALES - GENERAL: PAINLEVEL_OUTOF10: 0

## 2024-10-01 NOTE — PLAN OF CARE
Problem: Chronic Conditions and Co-morbidities  Goal: Patient's chronic conditions and co-morbidity symptoms are monitored and maintained or improved  10/1/2024 1616 by Cathy Rico, RN  Outcome: Progressing  10/1/2024 0813 by Mora Borrego, RN  Outcome: Progressing

## 2024-10-01 NOTE — PLAN OF CARE
Problem: Discharge Planning  Goal: Discharge to home or other facility with appropriate resources  10/1/2024 0813 by Mora Borrego RN  Outcome: Progressing  9/30/2024 2058 by Lo Cevallos RN  Outcome: Progressing  Flowsheets  Taken 9/30/2024 2000 by Lo Cevallos RN  Discharge to home or other facility with appropriate resources: Identify barriers to discharge with patient and caregiver  Taken 9/30/2024 0900 by Marina Pena RN  Discharge to home or other facility with appropriate resources:   Identify barriers to discharge with patient and caregiver   Arrange for needed discharge resources and transportation as appropriate     Problem: Safety - Adult  Goal: Free from fall injury  10/1/2024 0813 by Mora Borrego RN  Outcome: Progressing  9/30/2024 2058 by Lo Cevallos RN  Outcome: Progressing     Problem: Chronic Conditions and Co-morbidities  Goal: Patient's chronic conditions and co-morbidity symptoms are monitored and maintained or improved  10/1/2024 0813 by Mora Borrego RN  Outcome: Progressing  9/30/2024 2058 by Lo Cevlalos RN  Outcome: Progressing  Flowsheets  Taken 9/30/2024 2000 by Lo Cevallos RN  Care Plan - Patient's Chronic Conditions and Co-Morbidity Symptoms are Monitored and Maintained or Improved: Monitor and assess patient's chronic conditions and comorbid symptoms for stability, deterioration, or improvement  Taken 9/30/2024 0900 by Marina Pena RN  Care Plan - Patient's Chronic Conditions and Co-Morbidity Symptoms are Monitored and Maintained or Improved: Monitor and assess patient's chronic conditions and comorbid symptoms for stability, deterioration, or improvement     Problem: Pain  Goal: Verbalizes/displays adequate comfort level or baseline comfort level  10/1/2024 0813 by Mora Borrego RN  Outcome: Progressing  9/30/2024 2058 by Lo Cevallos RN  Outcome: Progressing  Flowsheets (Taken 9/30/2024 0900 by Marina Pena, RN)  Verbalizes/displays adequate

## 2024-10-01 NOTE — PLAN OF CARE
Problem: Discharge Planning  Goal: Discharge to home or other facility with appropriate resources  Outcome: Progressing  Flowsheets (Taken 9/30/2024 0900 by Marina Pena, RN)  Discharge to home or other facility with appropriate resources:   Identify barriers to discharge with patient and caregiver   Arrange for needed discharge resources and transportation as appropriate     Problem: Safety - Adult  Goal: Free from fall injury  Outcome: Progressing     Problem: Chronic Conditions and Co-morbidities  Goal: Patient's chronic conditions and co-morbidity symptoms are monitored and maintained or improved  Outcome: Progressing  Flowsheets (Taken 9/30/2024 0900 by Marina Pena, RN)  Care Plan - Patient's Chronic Conditions and Co-Morbidity Symptoms are Monitored and Maintained or Improved: Monitor and assess patient's chronic conditions and comorbid symptoms for stability, deterioration, or improvement     Problem: Pain  Goal: Verbalizes/displays adequate comfort level or baseline comfort level  Outcome: Progressing  Flowsheets (Taken 9/30/2024 0900 by Marina Pena, RN)  Verbalizes/displays adequate comfort level or baseline comfort level:   Encourage patient to monitor pain and request assistance   Assess pain using appropriate pain scale

## 2024-10-01 NOTE — DISCHARGE SUMMARY
Discharge Summary    Patient: Evan Huizar               Sex: male          DOA: 9/25/2024         YOB: 1970      Age:  54 y.o.        LOS:  LOS: 6 days                Admit Date: 9/25/2024    Discharge Date: 10/1/2024    Admission Diagnoses: Chest pain [R07.9]  Coronary artery disease due to calcified coronary lesion [I25.10, I25.84]  Chest pain, unspecified type [R07.9]    Discharge Diagnoses:    Hospital Problems             Last Modified POA    * (Principal) Chest pain 9/25/2024 Yes    Hx of BKA, right ([facility]) 9/26/2024 Yes    ESRD (end stage renal disease) on dialysis ([facility]) 9/26/2024 Yes    Hypertension 9/26/2024 Yes    Chronic anemia 9/26/2024 Yes    Chronic ulcer of left foot with fat layer exposed ([facility]) 9/26/2024 Yes    Chronic diastolic CHF (congestive heart failure) ([facility]) 9/26/2024 Yes        Discharge Condition: Good    Hospital Course ;  54 y.o. year old male   with ESRD on HD,  DM-2, anemia, uncontrolled hypertension, hyperphosphatemia,  non compliance to dialysis prescription, dietary/life style modification sent from dialysis unit for chest pain.   Lexiscan nuclear stress test is negative for detection of ischemia with normal EF  Cardiac telemetry stable   Overall patient denied any ischemic symptoms     Physical Exam:  BP (!) 152/97   Pulse 78   Temp 97.9 °F (36.6 °C) (Oral)   Resp 18   Ht 1.676 m (5' 6\")   Wt 84.3 kg (185 lb 13.6 oz)   SpO2 95%   BMI 30.00 kg/m²   General appearance: alert, cooperative, no distress, appears stated age  Head: Normocephalic, without obvious abnormality, atraumatic  Neck: supple, trachea midline  Lungs: clear to auscultation bilaterally  Heart: regular rate and rhythm, S1, S2 normal, no murmur, click, rub or gallop  Abdomen: soft, non-tender. Bowel sounds normal. No masses,  no organomegaly  Extremities: extremities normal, atraumatic, no cyanosis or edema  Skin: Skin color, texture, turgor normal. No rashes or lesions  Neurologic: Grossly

## 2024-10-02 NOTE — PROGRESS NOTES
Hospitalist Progress Note    Patient: Evan Huizar MRN: [id number]  CSN: [id number]    YOB: 1970  Age: 54 y.o.  Sex: male    DOA: 9/25/2024 LOS:  LOS: 3 days          Chief Complain :  Chest pain   54 y.o. year old male   with ESRD on HD,  DM-2, anemia, uncontrolled hypertension, hyperphosphatemia,  non compliance to dialysis prescription, dietary/life style modification sent from dialysis unit for chest pain.   Subjective:   Patient sitting on bed, no chest pain, complains of right stump pain.     Assessment/Plan     Active Hospital Problems    Diagnosis     Chest pain [R07.9]     Chronic ulcer of left foot with fat layer exposed ([facility]) [L97.522]     Chronic diastolic CHF (congestive heart failure) ([facility]) [I50.32]     Chronic anemia [D64.9]     Hypertension [I10]     Hx of BKA, right ([facility]) [Z89.511]     ESRD (end stage renal disease) on dialysis ([facility]) [N18.6, Z99.2]       Chest pain  No chest pain now  Likely from fluid overload  CTA chest with no PE, mild pulmonary edema, small bilateral pleural effusions  Trend troponin   Stress test not available until Monday.  Plan for stress test on Monday.     Hypertensive urgency  Resolved, continue home meds  Monitor BP      Pulmonary edema   Fluid removal with dialysis    ESRD  HD per Nephrology     Foot ulcer  Wound care  Iv cefepime  MRI Chronic osteomyelitis and fragmentation of the posterior and inferior  calcaneus, without evidence of acute osteomyelitis. Ulceration in the plantar  posterior hindfoot.  Seen by podiatry, bedside debridement done.      DVT Prophylaxis:  []Lovenox  [x]Hep SQ  []SCDs  []Coumadin, Eliquis, Xarelto, Pradaxa   []On Heparin gtt.    Case 
                                                                                                                                                                                                                                                                                                  Hospitalist Progress Note    Patient: Evan Huizar MRN: [id number]  CSN: [id number]    YOB: 1970  Age: 54 y.o.  Sex: male    DOA: 9/25/2024 LOS:  LOS: 5 days          Chief Complain :  Chest pain   54 y.o. year old male   with ESRD on HD,  DM-2, anemia, uncontrolled hypertension, hyperphosphatemia,  non compliance to dialysis prescription, dietary/life style modification sent from dialysis unit for chest pain.   Subjective:   Patient sitting on bed, no chest pain, complains of dizziness, refused dialysis. May need to get dialysis tomorrow and discharge. He will be on his MWF schedule as outpatient.    Assessment/Plan     Active Hospital Problems    Diagnosis     Chest pain [R07.9]     Chronic ulcer of left foot with fat layer exposed ([facility]) [L97.522]     Chronic diastolic CHF (congestive heart failure) ([facility]) [I50.32]     Chronic anemia [D64.9]     Hypertension [I10]     Hx of BKA, right ([facility]) [Z89.511]     ESRD (end stage renal disease) on dialysis ([facility]) [N18.6, Z99.2]       Chest pain  No chest pain now  Likely from fluid overload  CTA chest with no PE, mild pulmonary edema, small bilateral pleural effusions  Trend troponin   stress test negative for ischemia.  Cardiology following     Hypertensive urgency  Resolved, continue home meds  Monitor BP      Pulmonary edema   Fluid removal with dialysis    ESRD  HD per Nephrology     Foot ulcer  Wound care  Iv cefepime  MRI Chronic osteomyelitis and fragmentation of the posterior and inferior  calcaneus, without evidence of acute osteomyelitis. Ulceration in the plantar  posterior hindfoot.  Seen by podiatry, bedside debridement done.      DVT Prophylaxis:  []Lovenox  
                                                                                                                                                                                                                                                                                                  Hospitalist Progress Note    Patient: Evan Huizar MRN: [id number]  CSN: [id number]    YOB: 1970  Age: 54 y.o.  Sex: male    DOA: 9/25/2024 LOS:  LOS: 2 days          Chief Complain :  Chest pain   54 y.o. year old male   with ESRD on HD,  DM-2, anemia, uncontrolled hypertension , hyperphosphatemia,  non compliance to dialysis prescription, dietary/life style modification sent from dialysis unit for chest pain.   Subjective:   Patient sitting on bed, no chest pain, complains of right stump pain.     Assessment/Plan     Active Hospital Problems    Diagnosis     Chest pain [R07.9]     Chronic ulcer of left foot with fat layer exposed ([facility]) [L97.522]     Chronic diastolic CHF (congestive heart failure) ([facility]) [I50.32]     Chronic anemia [D64.9]     Hypertension [I10]     Hx of BKA, right ([facility]) [Z89.511]     ESRD (end stage renal disease) on dialysis ([facility]) [N18.6, Z99.2]           Chest pain  Likely from fluid overload  CTA chest with no PE, mild pulmonary edema, small bilateral pleural effusions  Trend troponin   Stress test not available today  Plan for stress test on Monday.     Hypertensive urgency  Resolved, continue home meds  Monitor BP      Pulmonary edema   Fluid removal with dialysis    ESRD  HD per Nephrology     Foot ulcer  Wound care  Iv cefepime  Mri Chronic osteomyelitis and fragmentation of the posterior and inferior  calcaneus, without evidence of acute osteomyelitis. Ulceration in the plantar  posterior hindfoot.  Seen by podiatry, bedside debridement done      DVT Prophylaxis:  []Lovenox  [x]Hep SQ  []SCDs  []Coumadin, Eliquis, Xarelto, Pradaxa   []On Heparin gtt.    Case discussed with:  [x]Patient  
Assessment:     ESRD  Uncontrolled Hypertension   Volume overload/Pulmonary edema   Anemia of CKD  Secondary hyperparathyroidism  Hyperphosphatemia      Plan:    HD MWF  Again next dialysis tomorrow on schedule day   Minimize volume infusion   Salt restriction below 2 gram per day   Resume his home antihypertensive medicine   Sevelamer for hyperphosphatemia   Sensipar 90 mg daily   Retacrit as indicated for anemia of CKD   Dose all meds for ESRD    CC: ESRD  Interval History: no interval change since yesterday      Subjective:   PT does not have any somatic complaints        Review of Systems  Negative for  SOB,  Nausea, vomiting        Blood pressure (!) 154/91, pulse 77, temperature 97.8 °F (36.6 °C), temperature source Oral, resp. rate 18, height 1.676 m (5' 6\"), weight 84.2 kg (185 lb 10 oz), SpO2 94%.    Intake/Output Summary (Last 24 hours) at 9/29/2024 0952  Last data filed at 9/29/2024 0800  Gross per 24 hour   Intake 240 ml   Output 400 ml   Net -160 ml        NAD, Conversant  No edema  Psychicatric: good judgment and insights, alert and oreinted        Intake/Output Summary (Last 24 hours) at 9/29/2024 0952  Last data filed at 9/29/2024 0800  Gross per 24 hour   Intake 240 ml   Output 400 ml   Net -160 ml      Recent Labs     09/28/24  0341   WBC 4.4*     Lab Results   Component Value Date/Time     09/28/2024 03:41 AM    K 4.8 09/28/2024 03:41 AM    CL 99 09/28/2024 03:41 AM    CO2 25 09/28/2024 03:41 AM    BUN 37 09/28/2024 03:41 AM    GFRAA 12 08/30/2022 01:13 AM     Current Facility-Administered Medications   Medication Dose Route Frequency Provider Last Rate Last Admin    amLODIPine (NORVASC) tablet 10 mg  10 mg Oral Daily Jenny Karimi MD   10 mg at 09/29/24 0824    cefepime (MAXIPIME) 1,000 mg in sodium chloride 0.9 % 50 mL IVPB (mini-bag)  1,000 mg IntraVENous Q24H Jenny Karimi MD   Stopped at 09/28/24 1251    sodium chloride flush 0.9 % injection 5-40 mL  5-40 mL 
Assessment:     Evan Huizar is a 54 y.o. year old male   with ESRD on HD,  DM-2, anemia, uncontrolled hypertension , hyperphosphatemia,  non compliance to dialysis prescription, dietary/life style modification sent from dialysis unit for chest pain .     He was found to have uncontrolled hypertension . CXR showed pulmonary edema unchanged      ESRD  Uncontrolled Hypertension   Volume overload/Pulmonary edema   Anemia of CKD  Secondary hyperparathyroidism  Hyperphosphatemia      Plan:    HD on Monday  Minimize volume infusion   Salt restriction below 2 gram per day   Resume his home antihypertensive medicine   Sevelamer for hyperphosphatemia   Sensipar 90 mg daily   Retacrit as indicated for anemia of CKD   Dose all meds for ESRD    CC: ESRD  Interval History: feeling fine, has loose stools, no chest pain      Subjective:   No change since i saw pt last. No new symptoms or issues.         Review of Systems  Negative for Edema, SOB, Chest pain, Tremors, Nausea, vomiting        Blood pressure (!) 154/91, pulse 77, temperature 97.8 °F (36.6 °C), temperature source Oral, resp. rate 18, height 1.676 m (5' 6\"), weight 84.2 kg (185 lb 10 oz), SpO2 94%.    Intake/Output Summary (Last 24 hours) at 9/29/2024 0953  Last data filed at 9/29/2024 0800  Gross per 24 hour   Intake 240 ml   Output 400 ml   Net -160 ml        awake and alert and NAD  Abdomen is soft  No edema  No new rash, dry skin      Intake/Output Summary (Last 24 hours) at 9/29/2024 0953  Last data filed at 9/29/2024 0800  Gross per 24 hour   Intake 240 ml   Output 400 ml   Net -160 ml      Recent Labs     09/28/24  0341   WBC 4.4*     Lab Results   Component Value Date/Time     09/28/2024 03:41 AM    K 4.8 09/28/2024 03:41 AM    CL 99 09/28/2024 03:41 AM    CO2 25 09/28/2024 03:41 AM    BUN 37 09/28/2024 03:41 AM    GFRAA 12 08/30/2022 01:13 AM      Allergies   Allergen Reactions    Vancomycin Other (See Comments)     Generalized desquamation - 
Bedside and Verbal shift change report given to SANIA Velazco (oncoming nurse) by SANIA Blanchard (offgoing nurse). Report included the following information Nurse Handoff Report, Index, ED Encounter Summary, ED SBAR, Adult Overview, Intake/Output, MAR, Recent Results, and Med Rec Status.     
Cardiology Progress Note        Patient: Evan Huizar        Sex: male          DOA: 9/25/2024  YOB: 1970      Age:  54 y.o.        LOS:  LOS: 2 days   Assessment/Plan     Principal Problem:    Chest pain  Active Problems:    Hx of BKA, right ([facility])    ESRD (end stage renal disease) on dialysis ([facility])    Hypertension    Chronic anemia    Chronic ulcer of left foot with fat layer exposed ([facility])    Chronic diastolic CHF (congestive heart failure) ([facility])  Resolved Problems:    * No resolved hospital problems. *      Plan:  Cardiac telemetry stable  Nclear camera is not working today   Stress test will be done on Monday  Continue with rest of the treatment      Chest pain   Hypertensive urgency   Chronic kidney disease on dialysis   Chronic diastolic heart failure   Chronic severe mitral valve calcification and chronic possible healed vegetation versus calcification  Uncontrolled hypertension   Right below-the-knee amputation   Bilateral pleural effusion left more than the right   Minimal troponin elevation without any evidence of ACS and EKG without any new ischemic EKG changes.     Plan.    Echocardiogram done and reviewed with patient no significant interval change when compared with recent echocardiogram done on 0/2024  EF is per the with normal wall motion   Mitral valve disorder chronic   Continue with antihypertensive treatment   Continue dialysis as per Nephrology               Subjective:    cc:     Chest pain        REVIEW OF SYSTEMS:     General: No fevers or chills.  Cardiovascular: No chest pain or pressure. No palpitations. No ankle swelling  Pulmonary: No SOB, orthopnea, PND  Gastrointestinal: No nausea, vomiting or diarrhea      Objective:      Visit Vitals  BP (!) 199/78   Pulse 85   Temp 97.3 °F (36.3 °C) (Axillary)   Resp 20   Ht 1.676 m (5' 6\")   Wt 81.6 kg (180 lb)   SpO2 97%   BMI 29.05 kg/m²     Body mass index is 29.05 kg/m².    Physical Exam:  General Appearance: Comfortable, not 
Cardiology Progress Note        Patient: Evan Huizar        Sex: male          DOA: 9/25/2024  YOB: 1970      Age:  54 y.o.        LOS:  LOS: 3 days   Assessment/Plan     Principal Problem:    Chest pain  Active Problems:    Hx of BKA, right (HCC)    ESRD (end stage renal disease) on dialysis ([facility])    Hypertension    Chronic anemia    Chronic ulcer of left foot with fat layer exposed ([facility])    Chronic diastolic CHF (congestive heart failure) ([facility])  Resolved Problems:    * No resolved hospital problems. *      Plan:  No chest pain  Cardiac telemetry stable  Stress test on Monday  Discussed with patient        Cardiac telemetry stable  Nclear camera is not working today   Stress test will be done on Monday  Continue with rest of the treatment      Chest pain   Hypertensive urgency   Chronic kidney disease on dialysis   Chronic diastolic heart failure   Chronic severe mitral valve calcification and chronic possible healed vegetation versus calcification  Uncontrolled hypertension   Right below-the-knee amputation   Bilateral pleural effusion left more than the right   Minimal troponin elevation without any evidence of ACS and EKG without any new ischemic EKG changes.     Plan.    Echocardiogram done and reviewed with patient no significant interval change when compared with recent echocardiogram done on 0/2024  EF is per the with normal wall motion   Mitral valve disorder chronic   Continue with antihypertensive treatment   Continue dialysis as per Nephrology               Subjective:    cc:     Chest pain        REVIEW OF SYSTEMS:     General: No fevers or chills.  Cardiovascular: No chest pain or pressure. No palpitations. No ankle swelling  Pulmonary: No SOB, orthopnea, PND  Gastrointestinal: No nausea, vomiting or diarrhea      Objective:      Visit Vitals  BP (!) 166/91   Pulse 73   Temp (!) 96.6 °F (35.9 °C) (Oral)   Resp 18   Ht 1.676 m (5' 6\")   Wt 81.6 kg (180 lb)   SpO2 98%   BMI 29.05 kg/m² 
Cardiology Progress Note        Patient: Evan Huizar        Sex: male          DOA: 9/25/2024  YOB: 1970      Age:  54 y.o.        LOS:  LOS: 4 days   Assessment/Plan     Principal Problem:    Chest pain  Active Problems:    Hx of BKA, right (HCC)    ESRD (end stage renal disease) on dialysis (HCC)    Hypertension    Chronic anemia    Chronic ulcer of left foot with fat layer exposed (HCC)    Chronic diastolic CHF (congestive heart failure) (HCC)  Resolved Problems:    * No resolved hospital problems. *      Plan:  9/29/2024  Cardiac telemetry stable  No chest pain or shortness of breath  Stress test tomorrow  Discussed with patient not to eat breakfast in the morning and no caffeine/caffeine related products in the morning.                No chest pain  Cardiac telemetry stable  Stress test on Monday  Discussed with patient        Cardiac telemetry stable  Nclear camera is not working today   Stress test will be done on Monday  Continue with rest of the treatment      Chest pain   Hypertensive urgency   Chronic kidney disease on dialysis   Chronic diastolic heart failure   Chronic severe mitral valve calcification and chronic possible healed vegetation versus calcification  Uncontrolled hypertension   Right below-the-knee amputation   Bilateral pleural effusion left more than the right   Minimal troponin elevation without any evidence of ACS and EKG without any new ischemic EKG changes.     Plan.    Echocardiogram done and reviewed with patient no significant interval change when compared with recent echocardiogram done on 0/2024  EF is per the with normal wall motion   Mitral valve disorder chronic   Continue with antihypertensive treatment   Continue dialysis as per Nephrology               Subjective:    cc:     Chest pain        REVIEW OF SYSTEMS:     General: No fevers or chills.  Cardiovascular: No chest pain or pressure. No palpitations. No ankle swelling  Pulmonary: No SOB, orthopnea, 
Case management specialist called Mr. Huizar at [phone] to inform him on all follow up appointments that have been made on his behalf. Writer left a detailed message.  
Case management specialist met with patient to review and go over Important Message from Medicare letter in which patient verbally acknowledged understanding. A copy of letter was left in room with patient.   
Dialysis done; patient discharged as per order; Discharge summary informed and reviewd with patient. Telebox removed; IV line removed and dressing placed; Wheeled patient down for the ride home;   
Patient may resume diet on arrival to unit post stress test.  
Patient needing Lyft to dialysis center upon DC. Patient aware and agreeable with DC plan.   
Podiatry:  Patient seen at bedside today for evaluation and treatment of chronic left heel ulcer.  Patient states this started approximately 1 month ago and has not gotten better.  Patient is a diabetic.    Open ulcer plantar left heel measuring 2.5 x 1.5 x 0.2 cm deep to the subcutaneous tissue with dark fibrotic base.  Negative erythema negative drainage negative malodor negative probing negative tracking.    X-rays negative for gas or bone infection.    Impression:   1.  Grade 1 Mckeon diabetic ulcer plantar left heel  2.  Diabetes with peripheral neuropathy bilateral lower extremities  3.    Recommendation:   1.  Continue IV antibiotics as per medicine.  2.  Continue wound care as needed.  3.  Sharp selective debridement left plantar heel ulcer using a #10 blade, deep to the subcutaneous tissue, to remove all necrotic and fibrotic tissue down to healthy bleeding base.  Apply collagen with protective adhesive dressing left plantar heel.  Will follow this patient as needed.  4.  No invasive surgery needed at this point for the left heel.    See consult for details.  
Pt dialyzed in room 337. Sitting up on side of the bed watching tv. No s/s of distress.    Accessed right IJ TDC. Brisk blood return, lumens flushed with no issues. No s/s of infection at site. CHG dressing is CDI, dated 9/27.     Tx initiated with no issues. Pt wishes to remain sitting up on side of the bed during tx.     Arterial alarms while pt sitting up on side of bed, advised pt to lie back in bed with HOB elevated. Complied.    Tx completed with no issues. 3L removed. Report given to primary RN Mora. Heparin left to indwell to both lumens 1.6 ml each. Red Curos caps placed to the end of both lumens.Pt left in stable condition.   
RENAL CONSULT PROGRESS NOTE   10/1/2024    Patient:  Evan Huizar  :  1970  Gender:  male  MRN #:  [id number]    Reason for Consult: ESRD, chest pain and pulmonary edema     Subjective: -   Says he was tired yesterday so refused dialysis, today feels better   BP stable  No other concern     Objective:    BP (!) 152/97   Pulse 78   Temp 97.9 °F (36.6 °C) (Oral)   Resp 18   Ht 1.676 m (5' 6\")   Wt 84.3 kg (185 lb 13.6 oz)   SpO2 95%   BMI 30.00 kg/m²     Physical Exam:    Pt awake,  alert and comfortable  Lung: Fine  crackles   Ext: no edema       Laboratory Data:    Lab Results   Component Value Date    BUN 37 (H) 2024    BUN 88 (H) 2024    BUN 76 (H) 2024     (L) 2024     (L) 2024     2024    CO2 25 2024    CO2 20 (L) 2024    CO2 23 2024    GLUCOSE 79 2024    GLUCOSE 89 2024    GLUCOSE 105 (H) 2024     Lab Results   Component Value Date    WBC 4.4 (L) 2024    HGB 10.3 (L) 2024    HCT 30.8 (L) 2024     Lab Results   Component Value Date    CALCIUM 8.1 (L) 2024     Lab Results   Component Value Date    HDL 62 (H) 2022     No results found for: \"TURBIDITY\"    Imaging Reveiwed:    CXR-    IMPRESSION:  Pulmonary edema similar to prior.         Assessment:    Evan Huizar is a 54 y.o. year old male   with ESRD on HD,  DM-2, anemia, uncontrolled hypertension , hyperphosphatemia,  non compliance to dialysis prescription, dietary/life style modification sent from dialysis unit for chest pain .    He was found to have uncontrolled hypertension . CXR showed pulmonary edema unchanged   Now is stable     ESRD  Uncontrolled Hypertension   Volume overload/Pulmonary edema   Anemia of CKD  Secondary hyperparathyroidism  Hyperphosphatemia   Medical non compliance     Plan:    Patient examined and labs reviewed. Plan for dialysis today for volume removal and clearance as he refused dialysis 
RENAL CONSULT PROGRESS NOTE   2024    Patient:  Evan Huizar  :  1970  Gender:  male  MRN #:  [id number]    Reason for Consult: ESRD, chest pain and pulmonary edema     SUBJECTIVE;  Saw him during dialysis  Feels tired  BP stable  No other concern     Objective:    BP (!) 187/97   Pulse 84   Temp 97.1 °F (36.2 °C)   Resp 18   Ht 1.676 m (5' 6\")   Wt 81.6 kg (180 lb)   SpO2 99%   BMI 29.05 kg/m²     Physical Exam:    Pt awake,  alert and comfortable  Lung: Fine  crackles   Ext: no edema   CNS- Oriented to time , place and person     Laboratory Data:    Lab Results   Component Value Date    BUN 88 (H) 2024    BUN 76 (H) 2024    BUN 64 (H) 2024     (L) 2024     2024     2024    CO2 20 (L) 2024    CO2 23 2024    CO2 22 2024    GLUCOSE 89 2024    GLUCOSE 105 (H) 2024    GLUCOSE 72 (L) 2024     Lab Results   Component Value Date    WBC 5.3 2024    HGB 9.9 (L) 2024    HCT 30.5 (L) 2024     Lab Results   Component Value Date    CALCIUM 7.4 (L) 2024     Lab Results   Component Value Date    HDL 62 (H) 2022     No results found for: \"TURBIDITY\"    Imaging Reveiwed:    CXR-    IMPRESSION:  Pulmonary edema similar to prior.         Assessment:    Evan Huizar is a 54 y.o. year old male   with ESRD on HD,  DM-2, anemia, uncontrolled hypertension , hyperphosphatemia,  non compliance to dialysis prescription, dietary/life style modification sent from dialysis unit for chest pain .    He was found to have uncontrolled hypertension . CXR showed pulmonary edema unchanged     ESRD  Uncontrolled Hypertension   Volume overload/Pulmonary edema   Anemia of CKD  Secondary hyperparathyroidism  Hyperphosphatemia     Plan:    Patient examined and labs reviewed. Saw him during dialysis, tolerating fine, Bp elevated, UF 3 kg, blood flow adequate    Again next dialysis tomorrow if needed if not on Monday 
Renal Dosing Adjustment     Cefepime was automatically dose-adjusted per [facility] P&T approved Protocols, with respect to renal function.  Indication: Bone and Joint Infection    Pt Weight:   Wt Readings from Last 1 Encounters:   09/26/24 81.6 kg (180 lb)       Previous Regimen                                            Cefepime 1 gram IV q12h   Creatinine Clearance Estimated Creatinine Clearance: 8 mL/min (A) (based on SCr of 10.4 mg/dL (H)).   BUN Lab Results   Component Value Date/Time    BUN 76 09/26/2024 05:36 AM         Assessment/Plan  Dose adjusted to Cefepime 1 gram IV q24h  Pt receives hemodialysis.  Pharmacy to continue to monitor patient daily.   Will make dosage adjustments based upon changing renal function.    Em Kaiser, PharmD  Contact: [phone]  
Sergio from [facility] received a referral, and is unable to complete referral at this time.  
TRANSFER - IN REPORT:    Verbal report received from Jessie LUI on Evan Huizar  being received from Emergency department  for routine progression of patient care      Report consisted of patient's Situation, Background, Assessment and   Recommendations(SBAR).     Information from the following report(s) Nurse Handoff Report, Index, ED Encounter Summary, ED SBAR, and MAR was reviewed with the receiving nurse.    Opportunity for questions and clarification was provided.      Assessment completed upon patient's arrival to unit and care assumed.    
Traci RN 3N--to speak with Dr Karimi on status of CT Chest exam//[id number]  
gtt.    Case discussed with:  [x]Patient  []Family  []Nursing  [x]Case Management [] Consultants          Discharge to;  [x] Home  [] Home Health  [] SNF/Rehab  [] LTAC. In 2 days    Review of systems : As above and,  General: No fevers or chills.  Cardiovascular: No chest pain or pressure. No palpitations.   Pulmonary: No shortness of breath.   Gastrointestinal: No nausea, vomiting.     Physical Exam: As above and,  General: Awake, cooperative, no acute distress    HEENT: NC, Atraumatic.  PERRLA, anicteric sclerae.  Lungs: CTA Bilaterally. No Wheezing/Rhonchi/Rales.  Heart:  S1 S2,  No murmur, No Rubs, No Gallops  Abdomen: Soft, Non distended, Non tender.  +Bowel sounds,   Extremities: Right BKA  Psych:   Not anxious or agitated.  Neurologic:  No acute neurological deficit.         Vital signs/Intake and Output:  Visit Vitals  BP (!) 157/81   Pulse 77   Temp 97.1 °F (36.2 °C) (Oral)   Resp 18   Ht 1.676 m (5' 6\")   Wt 84.2 kg (185 lb 10 oz)   SpO2 94%   BMI 29.96 kg/m²     Current Shift:  09/29 0701 - 09/29 1900  In: 480 [P.O.:480]  Out: -   Last three shifts:  09/27 1901 - 09/29 0700  In: -   Out: 400 [Urine:400]            Labs: Results:       Chemistry Recent Labs     09/27/24 0257 09/28/24 0341   * 133*   K 4.7 4.8    99*   CO2 20* 25   BUN 88* 37*   GLOB 4.4* 4.8*   ,  Lab Results   Component Value Date/Time    LACTA 1.9 06/29/2022 06:36 PM      CBC w/Diff Recent Labs     09/27/24 0257 09/28/24 0341   WBC 5.3 4.4*   RBC 3.85* 3.93*   HGB 9.9* 10.3*   HCT 30.5* 30.8*   * 126*      Cardiac Enzymes Lab Results   Component Value Date    CKTOTAL 17 (L) 07/05/2022    CKMB 1.9 08/27/2021    CKMBINDEX 1.7 08/27/2021    TROPONINI 0.06 (H) 08/27/2021    TROPHS 91 (H) 09/26/2024   ,  Lab Results   Component Value Date    BNP 92,443 (H) 06/20/2022      Coagulation No results for input(s): \"INR\", \"APTT\" in the last 72 hours.    Invalid input(s): \"PTP\"    Lipid Panel Lab Results   Component Value 
  Pancreas No results for input(s): \"LIPASE\" in the last 72 hours.,No results for input(s): \"AMYLASE\" in the last 72 hours.   Liver Enzymes No results for input(s): \"TP\" in the last 72 hours.    Invalid input(s): \"ALB\", \"TBIL\", \"AP\", \"SGOT\", \"GPT\", \"DBIL\"   Thyroid Studies Lab Results   Component Value Date/Time    TSH 2.38 08/29/2021 01:20 AM        Procedures/imaging: see electronic medical records for all procedures/Xrays and details which were not copied into this note but were reviewed prior to creation of Plan    TIME: E/M Time spent with patient and patient care issues: [] 45-55 mins  [x] 55-65 mins  [] 65 mins or more.     This time also includes physician non-face-to-face service time visit on the date of service such as  Preparing to see the patient (eg, review of tests)  Obtaining and/or reviewing separately obtained history  Performing a medically necessary appropriate examination and/or evaluation  Counseling and educating the patient/family/caregiver  Ordering medications, tests, or procedures  Referring and communicating with other health care professionals as needed  Documenting clinical information in the electronic or other health record  Independently interpreting results (not reported separately) and communicating results to the patient/family/caregiver  Care coordination and discharge planning with Case Management.    Dear patient or family member or Caretaker, if you are reviewing this note and have a question regarding the medical terminology, please bring it with you to your next PCP visit.  Medical notes are meant to be a communication between medical professionals.  Additionally, portion of this note were created using voice recognition function, syntax and phonetic over may have escaped proofreading.    
Oral Daily    cinacalcet (SENSIPAR) tablet 90 mg  90 mg Oral Daily    vitamin B-12 (CYANOCOBALAMIN) tablet 1,000 mcg  1,000 mcg Oral Daily    hydrALAZINE (APRESOLINE) tablet 100 mg  100 mg Oral 3 times per day    labetalol (NORMODYNE) tablet 200 mg  200 mg Oral BID    sevelamer (RENVELA) tablet 800 mg  800 mg Oral TID WC    hydrALAZINE (APRESOLINE) injection 10 mg  10 mg IntraVENous Q6H PRN    labetalol (NORMODYNE;TRANDATE) injection 10 mg  10 mg IntraVENous Q6H PRN               Lab/Data Reviewed:  Procedures/imaging: see electronic medical records for all procedures/Xrays   and details which were not copied into this note but were reviewed prior to creation of Plan        Recent Labs     09/28/24  0341   WBC 4.4*   HGB 10.3*   HCT 30.8*   *     Recent Labs     09/28/24  0341   *   K 4.8   CL 99*   CO2 25   BUN 37*       RADIOLOGY:  MRI FOOT LEFT WO CONTRAST    Result Date: 9/26/2024  1.  Study significantly degraded by motion. 2.  Chronic osteomyelitis and fragmentation of the posterior and inferior calcaneus, without evidence of acute osteomyelitis. Ulceration in the plantar posterior hindfoot. 3.  Mild osteoarthritis. 4.  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 Electronically signed by CHEPE GRAFF    CTA CHEST W WO CONTRAST    Result Date: 9/26/2024  Mild pulmonary edema, small bilateral pleural effusions left greater than right. No evidence of pulmonary embolism. Electronically signed by SHYANNE COCHRAN    XR CHEST PORTABLE    Result Date: 9/25/2024  Pulmonary edema similar to prior. Electronically signed by SARA MONTANO          Cardiology Procedures:   No results found for this or any previous visit. 09/25/24    ECHO (TTE) COMPLETE (PRN CONTRAST/BUBBLE/STRAIN/3D) 09/26/2024  5:54 PM (Final)    Interpretation Summary    Image quality is good.    Left Ventricle: Normal left ventricular systolic function with a visually estimated EF of 55 - 60%. Left ventricle size is 
09/25/24    ECHO (TTE) COMPLETE (PRN CONTRAST/BUBBLE/STRAIN/3D) 09/26/2024  5:54 PM (Final)    Interpretation Summary    Image quality is good.    Left Ventricle: Normal left ventricular systolic function with a visually estimated EF of 55 - 60%. Left ventricle size is normal. Moderately increased wall thickness. Findings consistent with moderate concentric hypertrophy. Normal wall motion. Indeterminate diastolic function due to mitral valve disease.    Left Atrium: Left atrium is mildly dilated. Left atrial volume index is mildly increased (35-41 mL/m2).    Right Atrium: Right atrium is mildly dilated.    Aortic Valve: Trileaflet valve. Sclerosis of the aortic valve cusps. No stenosis noted.    Mitral Valve: Severe annular calcification at the posterior leaflet. Mild regurgitation.No stenosis noted. There is is a moderately sized focal calcification in addition to annular calcification present that is fixed, echogenic and spherical on the posterior leaflet.  This calcification was also present on the previous echocardiogram done on 4/6/2024 without any interval change.  Possible differential diagnosis chronic annular and mitral valve calcification versus old healed vegetation.  Finding reviewed with the patient.    Tricuspid Valve: Mild regurgitation. The estimated RVSP is 37 mmHg.    No significant interval change in echocardiogram when compared with old echocardiogram    Signed by: Umang Aiken MD on 9/26/2024  5:54 PM    05/08/23    NM STRESS TEST WITH MYOCARDIAL PERFUSION 05/15/2023  1:27 PM (Final)    Interpretation Summary    Stress Combined Conclusion: The study is negative for myocardial ischemia. Findings suggest a low risk of myocardial ischemia.    Perfusion Comments: LV perfusion is probably normal. There is no evidence of inducible ischemia.    Perfusion Conclusion: There is no evidence of transient ischemic dilation (TID).    ECG: Resting ECG demonstrates normal sinus rhythm.    ECG: The ECG was not

## 2024-11-20 NOTE — CDMP QUERY
Pt admitted with plantar heel ulcer and posterior heel ulcer left foot. Pt noted to have Cellulitis and abscess of the foot  In H&, however \"negative cellulitis\" is documented in the podiatry consult note. If possible, please document in progress notes and d/c summary if you are evaluating and /or treating any of the following:. 
 
? Cellulitis confirmed and treated ? Cellulitis ruled out 
? Other ? Clinically unable to determine The medical record reflects the following: 
 
  Risk Factors: Chronic left foot ulcer, DM Clinical Indicators:  
 > sero sang discharge. +mild foul odor 
 > WBC 12.0, 23.3 
 > Wound culture preliminary- MODERATE GRAM NEGATIVE RODS,                  MODERATE STAPHYLOCOCCUS SPECIES Treatment: Receiving podiatry consult, wound culture, bacitracin prn, clindamycin,    zosyn, Levaquin Thank Nas howe RN, BSN, [address] [place] [address] 
[phone] (4) walks frequently

## 2024-12-26 ENCOUNTER — APPOINTMENT (OUTPATIENT)
Facility: HOSPITAL | Age: 54
DRG: 291 | End: 2024-12-26
Payer: MEDICARE

## 2024-12-26 ENCOUNTER — HOSPITAL ENCOUNTER (INPATIENT)
Facility: HOSPITAL | Age: 54
LOS: 6 days | Discharge: HOME | DRG: 291 | End: 2025-01-02
Attending: EMERGENCY MEDICINE | Admitting: INTERNAL MEDICINE
Payer: MEDICARE

## 2024-12-26 DIAGNOSIS — N49.2 CELLULITIS OF SCROTUM: ICD-10-CM

## 2024-12-26 DIAGNOSIS — N49.2 ABSCESS OF SCROTUM: ICD-10-CM

## 2024-12-26 DIAGNOSIS — N18.6 ESRD ON DIALYSIS (HCC): Primary | ICD-10-CM

## 2024-12-26 DIAGNOSIS — Z99.2 ESRD ON DIALYSIS (HCC): Primary | ICD-10-CM

## 2024-12-26 DIAGNOSIS — L02.215 PERINEAL ABSCESS: ICD-10-CM

## 2024-12-26 DIAGNOSIS — J81.0 ACUTE PULMONARY EDEMA: ICD-10-CM

## 2024-12-26 LAB
ALBUMIN SERPL-MCNC: 3.3 G/DL (ref 3.4–5)
ALBUMIN/GLOB SERPL: 0.7 (ref 0.8–1.7)
ALP SERPL-CCNC: 82 U/L (ref 45–117)
ALT SERPL-CCNC: 27 U/L (ref 16–61)
ANION GAP SERPL CALC-SCNC: 15 MMOL/L (ref 3–18)
AST SERPL-CCNC: 18 U/L (ref 10–38)
BASOPHILS # BLD: 0 K/UL (ref 0–0.1)
BASOPHILS NFR BLD: 0 % (ref 0–2)
BILIRUB SERPL-MCNC: 0.7 MG/DL (ref 0.2–1)
BUN SERPL-MCNC: 126 MG/DL (ref 7–18)
BUN/CREAT SERPL: 6 (ref 12–20)
CALCIUM SERPL-MCNC: 8.3 MG/DL (ref 8.5–10.1)
CHLORIDE SERPL-SCNC: 111 MMOL/L (ref 100–111)
CO2 SERPL-SCNC: 14 MMOL/L (ref 21–32)
CREAT SERPL-MCNC: 20.4 MG/DL (ref 0.6–1.3)
DIFFERENTIAL METHOD BLD: ABNORMAL
EKG ATRIAL RATE: 93 BPM
EKG DIAGNOSIS: NORMAL
EKG P AXIS: 70 DEGREES
EKG P-R INTERVAL: 170 MS
EKG Q-T INTERVAL: 388 MS
EKG QRS DURATION: 88 MS
EKG QTC CALCULATION (BAZETT): 482 MS
EKG R AXIS: 132 DEGREES
EKG T AXIS: -25 DEGREES
EKG VENTRICULAR RATE: 93 BPM
EOSINOPHIL # BLD: 0.1 K/UL (ref 0–0.4)
EOSINOPHIL NFR BLD: 2 % (ref 0–5)
ERYTHROCYTE [DISTWIDTH] IN BLOOD BY AUTOMATED COUNT: 18.2 % (ref 11.6–14.5)
GLOBULIN SER CALC-MCNC: 5 G/DL (ref 2–4)
GLUCOSE SERPL-MCNC: 87 MG/DL (ref 74–99)
HCT VFR BLD AUTO: 25 % (ref 36–48)
HGB BLD-MCNC: 8.5 G/DL (ref 13–16)
IMM GRANULOCYTES # BLD AUTO: 0 K/UL (ref 0–0.04)
IMM GRANULOCYTES NFR BLD AUTO: 1 % (ref 0–0.5)
LYMPHOCYTES # BLD: 0.4 K/UL (ref 0.9–3.6)
LYMPHOCYTES NFR BLD: 6 % (ref 21–52)
MCH RBC QN AUTO: 27.2 PG (ref 24–34)
MCHC RBC AUTO-ENTMCNC: 34 G/DL (ref 31–37)
MCV RBC AUTO: 80.1 FL (ref 78–100)
MONOCYTES # BLD: 0.7 K/UL (ref 0.05–1.2)
MONOCYTES NFR BLD: 10 % (ref 3–10)
NEUTS SEG # BLD: 5.5 K/UL (ref 1.8–8)
NEUTS SEG NFR BLD: 82 % (ref 40–73)
NRBC # BLD: 0 K/UL (ref 0–0.01)
NRBC BLD-RTO: 0 PER 100 WBC
NT PRO BNP: ABNORMAL PG/ML (ref 0–900)
PLATELET # BLD AUTO: 149 K/UL (ref 135–420)
PMV BLD AUTO: 10.6 FL (ref 9.2–11.8)
POTASSIUM SERPL-SCNC: 5.6 MMOL/L (ref 3.5–5.5)
PROT SERPL-MCNC: 8.3 G/DL (ref 6.4–8.2)
RBC # BLD AUTO: 3.12 M/UL (ref 4.35–5.65)
SODIUM SERPL-SCNC: 140 MMOL/L (ref 136–145)
TROPONIN I SERPL HS-MCNC: 39 NG/L (ref 0–78)
WBC # BLD AUTO: 6.7 K/UL (ref 4.6–13.2)

## 2024-12-26 PROCEDURE — 85025 COMPLETE CBC W/AUTO DIFF WBC: CPT

## 2024-12-26 PROCEDURE — 87209 SMEAR COMPLEX STAIN: CPT

## 2024-12-26 PROCEDURE — 87506 IADNA-DNA/RNA PROBE TQ 6-11: CPT

## 2024-12-26 PROCEDURE — 96366 THER/PROPH/DIAG IV INF ADDON: CPT

## 2024-12-26 PROCEDURE — 80053 COMPREHEN METABOLIC PANEL: CPT

## 2024-12-26 PROCEDURE — 96367 TX/PROPH/DG ADDL SEQ IV INF: CPT

## 2024-12-26 PROCEDURE — 76870 US EXAM SCROTUM: CPT

## 2024-12-26 PROCEDURE — 87177 OVA AND PARASITES SMEARS: CPT

## 2024-12-26 PROCEDURE — 6360000002 HC RX W HCPCS: Performed by: EMERGENCY MEDICINE

## 2024-12-26 PROCEDURE — 71045 X-RAY EXAM CHEST 1 VIEW: CPT

## 2024-12-26 PROCEDURE — 2580000003 HC RX 258: Performed by: EMERGENCY MEDICINE

## 2024-12-26 PROCEDURE — 5A1D70Z PERFORMANCE OF URINARY FILTRATION, INTERMITTENT, LESS THAN 6 HOURS PER DAY: ICD-10-PCS | Performed by: INTERNAL MEDICINE

## 2024-12-26 PROCEDURE — 6370000000 HC RX 637 (ALT 250 FOR IP): Performed by: EMERGENCY MEDICINE

## 2024-12-26 PROCEDURE — 74177 CT ABD & PELVIS W/CONTRAST: CPT

## 2024-12-26 PROCEDURE — 96375 TX/PRO/DX INJ NEW DRUG ADDON: CPT

## 2024-12-26 PROCEDURE — 83880 ASSAY OF NATRIURETIC PEPTIDE: CPT

## 2024-12-26 PROCEDURE — 93005 ELECTROCARDIOGRAM TRACING: CPT | Performed by: EMERGENCY MEDICINE

## 2024-12-26 PROCEDURE — 96365 THER/PROPH/DIAG IV INF INIT: CPT

## 2024-12-26 PROCEDURE — 99285 EMERGENCY DEPT VISIT HI MDM: CPT

## 2024-12-26 PROCEDURE — 90935 HEMODIALYSIS ONE EVALUATION: CPT

## 2024-12-26 PROCEDURE — 84484 ASSAY OF TROPONIN QUANT: CPT

## 2024-12-26 PROCEDURE — 6360000004 HC RX CONTRAST MEDICATION: Performed by: EMERGENCY MEDICINE

## 2024-12-26 RX ORDER — MORPHINE SULFATE 2 MG/ML
2 INJECTION, SOLUTION INTRAMUSCULAR; INTRAVENOUS
Status: COMPLETED | OUTPATIENT
Start: 2024-12-26 | End: 2024-12-26

## 2024-12-26 RX ORDER — ACETAMINOPHEN 325 MG/1
975 TABLET ORAL
Status: COMPLETED | OUTPATIENT
Start: 2024-12-26 | End: 2024-12-26

## 2024-12-26 RX ORDER — ALBUTEROL SULFATE 0.83 MG/ML
2.5 SOLUTION RESPIRATORY (INHALATION)
Status: COMPLETED | OUTPATIENT
Start: 2024-12-26 | End: 2024-12-26

## 2024-12-26 RX ORDER — IOPAMIDOL 612 MG/ML
100 INJECTION, SOLUTION INTRAVASCULAR
Status: COMPLETED | OUTPATIENT
Start: 2024-12-26 | End: 2024-12-26

## 2024-12-26 RX ORDER — CLINDAMYCIN PHOSPHATE 600 MG/50ML
600 INJECTION, SOLUTION INTRAVENOUS ONCE
Status: COMPLETED | OUTPATIENT
Start: 2024-12-26 | End: 2024-12-26

## 2024-12-26 RX ADMIN — CLINDAMYCIN IN 5 PERCENT DEXTROSE 600 MG: 12 INJECTION, SOLUTION INTRAVENOUS at 20:15

## 2024-12-26 RX ADMIN — IOPAMIDOL 100 ML: 612 INJECTION, SOLUTION INTRAVENOUS at 20:52

## 2024-12-26 RX ADMIN — ACETAMINOPHEN 975 MG: 325 TABLET ORAL at 19:52

## 2024-12-26 RX ADMIN — CEFEPIME 2000 MG: 2 INJECTION, POWDER, FOR SOLUTION INTRAVENOUS at 21:28

## 2024-12-26 RX ADMIN — ALBUTEROL SULFATE 2.5 MG: 2.5 SOLUTION RESPIRATORY (INHALATION) at 20:15

## 2024-12-26 RX ADMIN — MORPHINE SULFATE 2 MG: 2 INJECTION, SOLUTION INTRAMUSCULAR; INTRAVENOUS at 20:15

## 2024-12-26 ASSESSMENT — PAIN DESCRIPTION - LOCATION: LOCATION: SCROTUM

## 2024-12-26 ASSESSMENT — PAIN SCALES - GENERAL: PAINLEVEL_OUTOF10: 10

## 2024-12-26 NOTE — ED TRIAGE NOTES
Pt ambulatory to ed with multiple complaints. Pt states he has not had dialysis in 8 days, states he mixed up his days and then went out of town. Pt also complaints of testicle/groin pain and swelling and mucous in his stool. Pt Aox4, appears in no acute distress at this time.

## 2024-12-27 PROBLEM — J81.0 PULMONARY EDEMA, ACUTE: Status: ACTIVE | Noted: 2024-12-27

## 2024-12-27 LAB
ALBUMIN SERPL-MCNC: 3 G/DL (ref 3.4–5)
ANION GAP SERPL CALC-SCNC: 11 MMOL/L (ref 3–18)
BUN SERPL-MCNC: 62 MG/DL (ref 7–18)
BUN/CREAT SERPL: 5 (ref 12–20)
C COLI+JEJUNI TUF STL QL NAA+PROBE: NEGATIVE
CALCIUM SERPL-MCNC: 8.2 MG/DL (ref 8.5–10.1)
CHLORIDE SERPL-SCNC: 106 MMOL/L (ref 100–111)
CO2 SERPL-SCNC: 20 MMOL/L (ref 21–32)
CREAT SERPL-MCNC: 12.1 MG/DL (ref 0.6–1.3)
EC STX1+STX2 GENES STL QL NAA+PROBE: NEGATIVE
ETEC ELTA+ESTB GENES STL QL NAA+PROBE: NEGATIVE
GLUCOSE SERPL-MCNC: 75 MG/DL (ref 74–99)
P SHIGELLOIDES DNA STL QL NAA+PROBE: NEGATIVE
PHOSPHATE SERPL-MCNC: 5.9 MG/DL (ref 2.5–4.9)
POTASSIUM SERPL-SCNC: 3.4 MMOL/L (ref 3.5–5.5)
SALMONELLA SP SPAO STL QL NAA+PROBE: NEGATIVE
SHIGELLA SP+EIEC IPAH STL QL NAA+PROBE: NEGATIVE
SODIUM SERPL-SCNC: 137 MMOL/L (ref 136–145)
V CHOL+PARA+VUL DNA STL QL NAA+NON-PROBE: NEGATIVE
Y ENTEROCOL DNA STL QL NAA+NON-PROBE: NEGATIVE

## 2024-12-27 PROCEDURE — 6370000000 HC RX 637 (ALT 250 FOR IP): Performed by: INTERNAL MEDICINE

## 2024-12-27 PROCEDURE — 6360000002 HC RX W HCPCS: Performed by: STUDENT IN AN ORGANIZED HEALTH CARE EDUCATION/TRAINING PROGRAM

## 2024-12-27 PROCEDURE — 96366 THER/PROPH/DIAG IV INF ADDON: CPT

## 2024-12-27 PROCEDURE — 1100000003 HC PRIVATE W/ TELEMETRY

## 2024-12-27 PROCEDURE — 2500000003 HC RX 250 WO HCPCS: Performed by: INTERNAL MEDICINE

## 2024-12-27 PROCEDURE — 90935 HEMODIALYSIS ONE EVALUATION: CPT

## 2024-12-27 PROCEDURE — 36415 COLL VENOUS BLD VENIPUNCTURE: CPT

## 2024-12-27 PROCEDURE — 80069 RENAL FUNCTION PANEL: CPT

## 2024-12-27 PROCEDURE — 36556 INSERT NON-TUNNEL CV CATH: CPT

## 2024-12-27 PROCEDURE — 6360000002 HC RX W HCPCS: Performed by: INTERNAL MEDICINE

## 2024-12-27 RX ORDER — HYDRALAZINE HYDROCHLORIDE 20 MG/ML
10 INJECTION INTRAMUSCULAR; INTRAVENOUS EVERY 6 HOURS PRN
Status: DISCONTINUED | OUTPATIENT
Start: 2024-12-27 | End: 2025-01-02 | Stop reason: HOSPADM

## 2024-12-27 RX ORDER — BISACODYL 5 MG/1
5 TABLET, DELAYED RELEASE ORAL DAILY
Status: DISCONTINUED | OUTPATIENT
Start: 2024-12-27 | End: 2025-01-02 | Stop reason: HOSPADM

## 2024-12-27 RX ORDER — LANOLIN ALCOHOL/MO/W.PET/CERES
1000 CREAM (GRAM) TOPICAL DAILY
Status: DISCONTINUED | OUTPATIENT
Start: 2024-12-27 | End: 2025-01-02 | Stop reason: HOSPADM

## 2024-12-27 RX ORDER — HEPARIN SODIUM 1000 [USP'U]/ML
1000 INJECTION, SOLUTION INTRAVENOUS; SUBCUTANEOUS ONCE
Status: COMPLETED | OUTPATIENT
Start: 2024-12-27 | End: 2024-12-27

## 2024-12-27 RX ORDER — SEVELAMER CARBONATE 800 MG/1
800 TABLET, FILM COATED ORAL
Status: DISCONTINUED | OUTPATIENT
Start: 2024-12-27 | End: 2025-01-02 | Stop reason: HOSPADM

## 2024-12-27 RX ORDER — SODIUM CHLORIDE 0.9 % (FLUSH) 0.9 %
5-40 SYRINGE (ML) INJECTION EVERY 12 HOURS SCHEDULED
Status: DISCONTINUED | OUTPATIENT
Start: 2024-12-27 | End: 2025-01-02 | Stop reason: HOSPADM

## 2024-12-27 RX ORDER — CALCITRIOL 0.25 UG/1
0.5 CAPSULE, LIQUID FILLED ORAL DAILY
Status: DISCONTINUED | OUTPATIENT
Start: 2024-12-27 | End: 2025-01-02 | Stop reason: HOSPADM

## 2024-12-27 RX ORDER — SODIUM CHLORIDE 9 MG/ML
INJECTION, SOLUTION INTRAVENOUS PRN
Status: DISCONTINUED | OUTPATIENT
Start: 2024-12-27 | End: 2025-01-02 | Stop reason: HOSPADM

## 2024-12-27 RX ORDER — SODIUM CHLORIDE 0.9 % (FLUSH) 0.9 %
5-40 SYRINGE (ML) INJECTION PRN
Status: DISCONTINUED | OUTPATIENT
Start: 2024-12-27 | End: 2025-01-02 | Stop reason: HOSPADM

## 2024-12-27 RX ORDER — HEPARIN SODIUM 1000 [USP'U]/ML
INJECTION, SOLUTION INTRAVENOUS; SUBCUTANEOUS
Status: DISPENSED
Start: 2024-12-27 | End: 2024-12-28

## 2024-12-27 RX ORDER — MAGNESIUM SULFATE IN WATER 40 MG/ML
2000 INJECTION, SOLUTION INTRAVENOUS PRN
Status: DISCONTINUED | OUTPATIENT
Start: 2024-12-27 | End: 2025-01-02 | Stop reason: HOSPADM

## 2024-12-27 RX ORDER — HYDRALAZINE HYDROCHLORIDE 50 MG/1
100 TABLET, FILM COATED ORAL EVERY 8 HOURS SCHEDULED
Status: DISCONTINUED | OUTPATIENT
Start: 2024-12-27 | End: 2025-01-02 | Stop reason: HOSPADM

## 2024-12-27 RX ORDER — ONDANSETRON 4 MG/1
4 TABLET, ORALLY DISINTEGRATING ORAL EVERY 8 HOURS PRN
Status: DISCONTINUED | OUTPATIENT
Start: 2024-12-27 | End: 2025-01-02 | Stop reason: HOSPADM

## 2024-12-27 RX ORDER — ACETAMINOPHEN 325 MG/1
650 TABLET ORAL EVERY 6 HOURS PRN
Status: DISCONTINUED | OUTPATIENT
Start: 2024-12-27 | End: 2025-01-02 | Stop reason: HOSPADM

## 2024-12-27 RX ORDER — ACETAMINOPHEN 650 MG/1
650 SUPPOSITORY RECTAL EVERY 6 HOURS PRN
Status: DISCONTINUED | OUTPATIENT
Start: 2024-12-27 | End: 2025-01-02 | Stop reason: HOSPADM

## 2024-12-27 RX ORDER — HEPARIN SODIUM 5000 [USP'U]/ML
5000 INJECTION, SOLUTION INTRAVENOUS; SUBCUTANEOUS EVERY 8 HOURS SCHEDULED
Status: DISCONTINUED | OUTPATIENT
Start: 2024-12-27 | End: 2025-01-02 | Stop reason: HOSPADM

## 2024-12-27 RX ORDER — DOXYCYCLINE 100 MG/1
100 CAPSULE ORAL EVERY 12 HOURS SCHEDULED
Status: COMPLETED | OUTPATIENT
Start: 2024-12-27 | End: 2024-12-31

## 2024-12-27 RX ORDER — LABETALOL 200 MG/1
200 TABLET, FILM COATED ORAL 2 TIMES DAILY
Status: DISCONTINUED | OUTPATIENT
Start: 2024-12-27 | End: 2025-01-02 | Stop reason: HOSPADM

## 2024-12-27 RX ORDER — ONDANSETRON 2 MG/ML
4 INJECTION INTRAMUSCULAR; INTRAVENOUS EVERY 6 HOURS PRN
Status: DISCONTINUED | OUTPATIENT
Start: 2024-12-27 | End: 2025-01-02 | Stop reason: HOSPADM

## 2024-12-27 RX ORDER — CINACALCET 30 MG/1
90 TABLET, FILM COATED ORAL DAILY
Status: DISCONTINUED | OUTPATIENT
Start: 2024-12-27 | End: 2025-01-02 | Stop reason: HOSPADM

## 2024-12-27 RX ORDER — POLYETHYLENE GLYCOL 3350 17 G/17G
17 POWDER, FOR SOLUTION ORAL DAILY PRN
Status: DISCONTINUED | OUTPATIENT
Start: 2024-12-27 | End: 2025-01-02 | Stop reason: HOSPADM

## 2024-12-27 RX ORDER — AMLODIPINE BESYLATE 5 MG/1
10 TABLET ORAL DAILY
Status: DISCONTINUED | OUTPATIENT
Start: 2024-12-27 | End: 2025-01-02 | Stop reason: HOSPADM

## 2024-12-27 RX ORDER — SENNOSIDES A AND B 8.6 MG/1
1 TABLET, FILM COATED ORAL DAILY PRN
Status: DISCONTINUED | OUTPATIENT
Start: 2024-12-27 | End: 2025-01-02 | Stop reason: HOSPADM

## 2024-12-27 RX ADMIN — HEPARIN SODIUM 5000 UNITS: 5000 INJECTION INTRAVENOUS; SUBCUTANEOUS at 06:17

## 2024-12-27 RX ADMIN — SEVELAMER CARBONATE 800 MG: 800 TABLET, FILM COATED ORAL at 08:13

## 2024-12-27 RX ADMIN — AMLODIPINE BESYLATE 10 MG: 5 TABLET ORAL at 08:13

## 2024-12-27 RX ADMIN — HYDRALAZINE HYDROCHLORIDE 100 MG: 50 TABLET ORAL at 23:02

## 2024-12-27 RX ADMIN — HYDRALAZINE HYDROCHLORIDE 100 MG: 50 TABLET ORAL at 06:17

## 2024-12-27 RX ADMIN — CINACALCET HYDROCHLORIDE 90 MG: 30 TABLET, FILM COATED ORAL at 08:12

## 2024-12-27 RX ADMIN — SODIUM CHLORIDE, PRESERVATIVE FREE 10 ML: 5 INJECTION INTRAVENOUS at 08:21

## 2024-12-27 RX ADMIN — LABETALOL HYDROCHLORIDE 200 MG: 200 TABLET, FILM COATED ORAL at 08:13

## 2024-12-27 RX ADMIN — HEPARIN SODIUM 5000 UNITS: 5000 INJECTION INTRAVENOUS; SUBCUTANEOUS at 23:02

## 2024-12-27 RX ADMIN — LABETALOL HYDROCHLORIDE 200 MG: 200 TABLET, FILM COATED ORAL at 23:02

## 2024-12-27 RX ADMIN — ONDANSETRON 4 MG: 2 INJECTION INTRAMUSCULAR; INTRAVENOUS at 11:49

## 2024-12-27 RX ADMIN — CYANOCOBALAMIN TAB 1000 MCG 1000 MCG: 1000 TAB at 08:13

## 2024-12-27 RX ADMIN — CALCITRIOL CAPSULES 0.25 MCG 0.5 MCG: 0.25 CAPSULE ORAL at 08:13

## 2024-12-27 RX ADMIN — HEPARIN SODIUM 1000 UNITS: 1000 INJECTION INTRAVENOUS; SUBCUTANEOUS at 01:08

## 2024-12-27 RX ADMIN — SEVELAMER CARBONATE 800 MG: 800 TABLET, FILM COATED ORAL at 11:38

## 2024-12-27 RX ADMIN — ACETAMINOPHEN 650 MG: 325 TABLET ORAL at 06:17

## 2024-12-27 RX ADMIN — DOXYCYCLINE 100 MG: 100 CAPSULE ORAL at 08:20

## 2024-12-27 RX ADMIN — SEVELAMER CARBONATE 800 MG: 800 TABLET, FILM COATED ORAL at 16:46

## 2024-12-27 RX ADMIN — SODIUM CHLORIDE, PRESERVATIVE FREE 10 ML: 5 INJECTION INTRAVENOUS at 23:03

## 2024-12-27 RX ADMIN — DOXYCYCLINE 100 MG: 100 CAPSULE ORAL at 23:02

## 2024-12-27 RX ADMIN — HEPARIN SODIUM 5000 UNITS: 5000 INJECTION INTRAVENOUS; SUBCUTANEOUS at 14:34

## 2024-12-27 ASSESSMENT — PAIN DESCRIPTION - LOCATION: LOCATION: GROIN

## 2024-12-27 ASSESSMENT — PAIN SCALES - GENERAL
PAINLEVEL_OUTOF10: 10
PAINLEVEL_OUTOF10: 9
PAINLEVEL_OUTOF10: 0

## 2024-12-27 ASSESSMENT — PAIN DESCRIPTION - PAIN TYPE: TYPE: CHRONIC PAIN

## 2024-12-27 ASSESSMENT — PAIN DESCRIPTION - ORIENTATION: ORIENTATION: LEFT

## 2024-12-27 NOTE — DIALYSIS
Treatment summary  Received report from Vilma LI Rn    Received pt. In bed awake and alert. VSS, R chest TDC functioning well  Accessed without complication, Treatment initiated    Pt. Vomited large emesis shortly after eating lunch. Requested treatment   Time reduced to 3hrs    Dialyzed patient in  room 350  for 3 hours.     Total Uf 2600  ml  Net UF 2100 ml     Treatment note:           Patient tolerate treatment well remain in stable condition.    Offered assistance with repositioning every 2 hours.      Vascular access visible at all times and line connected at all   times during treatment. Access R chest TDC Functioning well  De access without complications.       Report given to Vilma LI RN with all questions answered.

## 2024-12-27 NOTE — ED PROVIDER NOTES
EMERGENCY DEPARTMENT HISTORY AND PHYSICAL EXAM    7:53 PM      Date: 12/26/2024  Patient Name: Evan Huizar    History of Presenting Illness     Chief Complaint   Patient presents with    Needs Dialysis    Groin Swelling       History From: Patient    Evan Huizar is a 54 y.o. male   54 y.o.male with a history of ESRD on hemodialysis, MSSA mitral valve endocarditis, uncontrolled hypertension, chronic combined systolic and diastolic heart failure, diabetes mellitus type 2 comes and with 3 different chief complaints.  Firstly patient states that he has not had dialysis in over 1 week, last dialysis on Wednesday.  Also patient states that whenever he goes to defecate his stool is pure mucus.  Patient states that this has been going on for 1 day.  Also patient states that he has scrotal pain that is also been going on for 1 day.  States that the scrotal pain is associate with swelling as well and the swelling of the scrotum has happened in the past but never this much swelling and it was never this painful.    Patient denies shortness of breath, chest pain  Patient denies any other acute complaints at this time           Nursing Notes were all reviewed and agreed with or any disagreements were addressed in the HPI.    PCP: Deondre Sheets MD    Current Facility-Administered Medications   Medication Dose Route Frequency Provider Last Rate Last Admin    sodium zirconium cyclosilicate (LOKELMA) oral suspension 5 g  5 g Oral Daily Dennis Lozano MD        albuterol (PROVENTIL) (2.5 MG/3ML) 0.083% nebulizer solution 2.5 mg  2.5 mg Nebulization NOW Dennis Lozano MD        morphine (PF) injection 2 mg  2 mg IntraVENous NOW Dennis Lozano MD        clindamycin (CLEOCIN) 600 mg in dextrose 5 % 50 mL IVPB  600 mg IntraVENous Once Dennis Lozano MD        ceFEPIme (MAXIPIME) 2,000 mg in sodium chloride 0.9 % 100 mL IVPB (mini-bag)  2,000 mg IntraVENous NOW Dennis Lozano MD         Current Outpatient Medications 
appears to be an acute condition.    54 y.o. male with a history of ESRD.  He has missed dialysis for 1 week.  Patient also has some scrotal edema.  Additionally he has mucoid diarrhea.  Scrotal ultrasound performed.  Pending CT scan of the abdomen pelvis.  Patient's potassium is at 5.6.  Patient's creatinine is over 20.  BUN at 126.  proBNP elevated to greater than 110,000. Already discussed with Nephrology, Dr. Vincent, who will arrange for dialysis tonight and tomorrow morning.  Pending hospitalist consult for admission after CT scan.  Patient was given clindamycin for cellulitis at the scrotum/perineum.  Patient requiring multiple dialysis treatment for his cellulitis.  Discussed with hospitalist for admission.   I discussed each of these tests and considerations with the patient. They agree with the plan of admission.      ADDITIONAL CONSIDERATIONS:  None    SMOKING CESSATION:   None    CRITICAL CARE TIME:     None    Farooq Alas MD      Diagnosis and Disposition     DISPOSITION Admitted 12/27/2024 01:06:13 AM   DISPOSITION CONDITION Stable     ADMISSION NOTE:    Critical Care Time: 0 minutes    Core Measures (for hospitalized patients):    1. Hospitalization Decision Time:  The decision to hospitalize the patient was made by Dennis Lozano MD @ at 8:56 PM on 12/26/2024    2. Aspirin: Aspirin was not given because the patient did not present with a stroke at the time of their Emergency Department evaluation    12:00 AM. Patient is being admitted to the hospital by Dr. Prince. The results of their tests and reasons for their admission have been discussed with them and/or available family. They convey agreement and understanding for the need to be admitted and for their admission diagnosis.      CONDITIONS ON ADMISSION:  Sepsis is not present at the time of admission. Deep Vein Thrombosis is not present at the time of admission. Thrombosis is not present at the time of admission. Urinary Tract Infection is

## 2024-12-27 NOTE — CONSULTS
RENAL CONSULT  2024    Patient:  Evan Huizar  :  1970  Gender:  male  MRN #:  [id number]    Reason for Consult: ESRD related care, pulmonary edema and uremia    History of Present Illness:    Evan Huizar is a 54 y.o. year old male ESRD on HD,  DM-2, anemia, uncontrolled hypertension , hyperphosphatemia,  non compliance to dialysis prescription, dietary/life style modification presented with multiple complaints like feeling fatigue,tired, shortness of breath . Reports scrotal pain and penile discharge . He missed dialysis for almost a week.   Had dialysis last night , still mental status is not at baseline . Reports feeling little better   But still weak/tired and dyspnea         Past Medical History:   Diagnosis Date    Chronic kidney disease     Diabetes (HCC)     Heart failure (HCC)     Hemodialysis patient (HCC)     Hypertension     Psychiatric illness 2023    Sickle cell trait (HCC)      Past Surgical History:   Procedure Laterality Date    IR NONTUNNELED VASCULAR CATHETER > 5 YEARS  2022    IR NONTUNNELED VASCULAR CATHETER 2022 [facility]    IR NONTUNNELED VASCULAR CATHETER > 5 YEARS  2022    IR NONTUNNELED VASCULAR CATHETER > 5 YEARS  2023    IR NONTUNNELED VASCULAR CATHETER 2023 [facility]    IR THRMB/INFUSION DIALYSIS CIRCUIT  6/3/2022    IR THRMB/INFUSION DIAYSIS CIRCUIT 6/3/2022 [facility]    IR THRMB/INFUSION DIALYSIS CIRCUIT  6/3/2022    IR TUNNELED CVC PLACE WO SQ PORT/PUMP > 5 YEARS  2022    IR TUNNELED CATHETER PLACEMENT GREATER THAN 5 YEARS 2022 [facility]    IR TUNNELED CVC PLACE WO SQ PORT/PUMP > 5 YEARS  2022    IR TUNNELED CVC PLACE WO SQ PORT/PUMP > 5 YEARS  2023    IR TUNNELED CATHETER PLACEMENT GREATER THAN 5 YEARS 2023 [facility]    IR TUNNELED CVC PLACE WO SQ PORT/PUMP > 5 YEARS  2023    IR TUNNELED CATHETER PLACEMENT GREATER THAN 5 YEARS 2023 Mora Chowdary MD [facility]

## 2024-12-27 NOTE — H&P
decompensation with pulm edema as above  UF by HD.   Produces minimal urine.     HTN  Resume home meds that are part of GDMT.    CODE STATUS: FC  Case discussed with:  [x]Patient  []Family [] Consultants  [x]Nursing  []Case Management   [x] ED Provider  DVT Prophylaxis:  []Lovenox SQ  [x]Heparin SQ  []SCDs  []Coumadin   []On Heparin gtt or therapeutic Lovenox  DISPOSITION: Pt independent at baseline, anticipate pt will discharge home after hospitalization.       TIME:  55 minutes were spent on the care of this patient today,  This time also includes physician non-face-to-face service time visit on the date of service such as  Preparing to see the patient (eg, review of tests)  Obtaining and/or reviewing separately obtained history  Performing a medically necessary appropriate examination and/or evaluation  Counseling and educating the patient/family/caregiver  Ordering medications, tests, or procedures  Referring and communicating with other health care professionals as needed  Documenting clinical information in the electronic or other health record  Independently interpreting results (not reported separately) and communicating results to the patient/family/caregiver  Care coordination and discharge planning with Case Management.        Dear patient, if you are reviewing this note and have a question regarding the medical terminology, please bring it with you to your next PCP visit.  Medical notes are meant to be a communication between medical professionals.  Additionally, portion of this note were created using voice recognition function; therefore, syntax and phonetic errors may have escaped proofreading.    Ham Prince DO, PhD  Hospital Medicine    12/27/2024 4:29 AM

## 2024-12-27 NOTE — PLAN OF CARE
Problem: Chronic Conditions and Co-morbidities  Goal: Patient's chronic conditions and co-morbidity symptoms are monitored and maintained or improved  12/27/2024 0959 by Farooq Taylor RN  Outcome: Progressing  12/27/2024 0644 by Antonieta Leslie RN  Outcome: Progressing  Flowsheets (Taken 12/27/2024 0240)  Care Plan - Patient's Chronic Conditions and Co-Morbidity Symptoms are Monitored and Maintained or Improved:   Monitor and assess patient's chronic conditions and comorbid symptoms for stability, deterioration, or improvement   Collaborate with multidisciplinary team to address chronic and comorbid conditions and prevent exacerbation or deterioration     Problem: Discharge Planning  Goal: Discharge to home or other facility with appropriate resources  12/27/2024 0959 by Farooq Taylor RN  Outcome: Progressing  12/27/2024 0644 by Antonieta Leslie RN  Outcome: Progressing  Flowsheets  Taken 12/27/2024 0246  Discharge to home or other facility with appropriate resources:   Identify barriers to discharge with patient and caregiver   Arrange for needed discharge resources and transportation as appropriate   Identify discharge learning needs (meds, wound care, etc)  Taken 12/27/2024 0240  Discharge to home or other facility with appropriate resources:   Identify barriers to discharge with patient and caregiver   Arrange for needed discharge resources and transportation as appropriate     Problem: Pain  Goal: Verbalizes/displays adequate comfort level or baseline comfort level  12/27/2024 0959 by Farooq Taylor RN  Outcome: Progressing  12/27/2024 0644 by Antonieta Leslie RN  Outcome: Progressing     Problem: Safety - Adult  Goal: Free from fall injury  12/27/2024 0959 by Farooq Taylor RN  Outcome: Progressing  12/27/2024 0644 by Antonieta Leslie RN  Outcome: Progressing

## 2024-12-27 NOTE — PLAN OF CARE
Signed         INTERVENTION:  HEMODYNAMIC STABILIZATION  MAINTAIN BP WNL WHILE ON HD.     INTERVENTION:  FLUID MANAGEMENT  WILL ATTEMPT 3000 ML TOTAL FLUID REMOVAL AS TOLERATED.     INTERVENTION:  METABOLIC/ELECTROLYTE MANAGEMENT  2.0 POTASSIUM 2.5 CALCIUM DIALYSATE USED WITH HD TODAY.     INTERVENTION:  HEMODIALYSIS ACCESS SITE MANAGEMENT  RIGHT SIDE CVC USING ASEPTIC TECHNIQUE.     GOAL:  SIGNS AND SYMPTOMS OF LISTED POTENTIAL PROBLEMS WILL BE ABSENT OR MANAGEABLE.     OUTCOME:  PROGRESSING.     HD PLANNED FOR 3 HOURS TODAY.

## 2024-12-27 NOTE — PLAN OF CARE
Problem: Chronic Conditions and Co-morbidities  Goal: Patient's chronic conditions and co-morbidity symptoms are monitored and maintained or improved  Outcome: Progressing  Flowsheets (Taken 12/27/2024 0240)  Care Plan - Patient's Chronic Conditions and Co-Morbidity Symptoms are Monitored and Maintained or Improved:   Monitor and assess patient's chronic conditions and comorbid symptoms for stability, deterioration, or improvement   Collaborate with multidisciplinary team to address chronic and comorbid conditions and prevent exacerbation or deterioration     Problem: Discharge Planning  Goal: Discharge to home or other facility with appropriate resources  Outcome: Progressing  Flowsheets  Taken 12/27/2024 0246  Discharge to home or other facility with appropriate resources:   Identify barriers to discharge with patient and caregiver   Arrange for needed discharge resources and transportation as appropriate   Identify discharge learning needs (meds, wound care, etc)  Taken 12/27/2024 0240  Discharge to home or other facility with appropriate resources:   Identify barriers to discharge with patient and caregiver   Arrange for needed discharge resources and transportation as appropriate     Problem: Pain  Goal: Verbalizes/displays adequate comfort level or baseline comfort level  Outcome: Progressing     Problem: Safety - Adult  Goal: Free from fall injury  Outcome: Progressing

## 2024-12-27 NOTE — ED NOTES
Gave report to charge nurse. Nurse to accompany patient to the floor once dialysis nurse clears the room.

## 2024-12-27 NOTE — CARE COORDINATION
12/27/24 1051   Service Assessment   Patient Orientation Alert and Oriented   Cognition Alert   History Provided By Patient   Primary Caregiver Self   Accompanied By/Relationship N/A   Support Systems Friends/Neighbors   PCP Verified by CM Yes   Last Visit to PCP Within last 6 months   Prior Functional Level Independent in ADLs/IADLs   Current Functional Level Independent in ADLs/IADLs   Can patient return to prior living arrangement Yes   Ability to make needs known: Good   Family able to assist with home care needs: Yes   Would you like for me to discuss the discharge plan with any other family members/significant others, and if so, who? Yes   Financial Resources Medicare   Community Resources Other (Comment)  (Dialysis)   Social/Functional History   Lives With Alone   Type of Home Apartment   Home Equipment Rollator   Prior Level of Assist for ADLs Independent   Prior Level of Assist for Homemaking Independent   Ambulation Assistance Independent   Prior Level of Assist for Transfers Independent   Active  Yes   Mode of Transportation Car   Occupation On disability   Discharge Planning   Type of Residence Apartment   Living Arrangements Alone   Current Services Prior To Admission Home Care   Potential Assistance Needed Home Care   DME Ordered? No   Potential Assistance Purchasing Medications No   Type of Home Care Services None   Patient expects to be discharged to: Apartment   History of falls? 0   Services At/After Discharge   Transition of Care Consult (CM Consult) Discharge Planning;Home Health   Services At/After Discharge None   Mode of Transport at Discharge Self   Confirm Follow Up Transport Self   Condition of Participation: Discharge Planning   The Plan for Transition of Care is related to the following treatment goals: The patient states at time of DC he will return to home   The Patient and/or Patient Representative was provided with a Choice of Provider? Patient   The Patient and/Or Patient

## 2024-12-28 LAB
ALBUMIN SERPL-MCNC: 3.2 G/DL (ref 3.4–5)
ANION GAP SERPL CALC-SCNC: 8 MMOL/L (ref 3–18)
BUN SERPL-MCNC: 33 MG/DL (ref 7–18)
BUN/CREAT SERPL: 4 (ref 12–20)
CALCIUM SERPL-MCNC: 8.2 MG/DL (ref 8.5–10.1)
CHLORIDE SERPL-SCNC: 103 MMOL/L (ref 100–111)
CO2 SERPL-SCNC: 26 MMOL/L (ref 21–32)
CREAT SERPL-MCNC: 8.38 MG/DL (ref 0.6–1.3)
GLUCOSE SERPL-MCNC: 75 MG/DL (ref 74–99)
PHOSPHATE SERPL-MCNC: 6.1 MG/DL (ref 2.5–4.9)
POTASSIUM SERPL-SCNC: 3.6 MMOL/L (ref 3.5–5.5)
SODIUM SERPL-SCNC: 137 MMOL/L (ref 136–145)

## 2024-12-28 PROCEDURE — 6370000000 HC RX 637 (ALT 250 FOR IP): Performed by: INTERNAL MEDICINE

## 2024-12-28 PROCEDURE — 6360000002 HC RX W HCPCS: Performed by: INTERNAL MEDICINE

## 2024-12-28 PROCEDURE — 80069 RENAL FUNCTION PANEL: CPT

## 2024-12-28 PROCEDURE — 2500000003 HC RX 250 WO HCPCS: Performed by: INTERNAL MEDICINE

## 2024-12-28 PROCEDURE — 36415 COLL VENOUS BLD VENIPUNCTURE: CPT

## 2024-12-28 PROCEDURE — 1100000003 HC PRIVATE W/ TELEMETRY

## 2024-12-28 RX ORDER — OXYCODONE AND ACETAMINOPHEN 5; 325 MG/1; MG/1
1 TABLET ORAL EVERY 6 HOURS PRN
Status: DISCONTINUED | OUTPATIENT
Start: 2024-12-28 | End: 2025-01-02 | Stop reason: HOSPADM

## 2024-12-28 RX ADMIN — DOXYCYCLINE 100 MG: 100 CAPSULE ORAL at 08:59

## 2024-12-28 RX ADMIN — OXYCODONE HYDROCHLORIDE AND ACETAMINOPHEN 1 TABLET: 5; 325 TABLET ORAL at 14:04

## 2024-12-28 RX ADMIN — LABETALOL HYDROCHLORIDE 200 MG: 200 TABLET, FILM COATED ORAL at 08:59

## 2024-12-28 RX ADMIN — CYANOCOBALAMIN TAB 1000 MCG 1000 MCG: 1000 TAB at 08:59

## 2024-12-28 RX ADMIN — ACETAMINOPHEN 650 MG: 325 TABLET ORAL at 00:30

## 2024-12-28 RX ADMIN — BISACODYL 5 MG: 5 TABLET, COATED ORAL at 08:59

## 2024-12-28 RX ADMIN — HEPARIN SODIUM 5000 UNITS: 5000 INJECTION INTRAVENOUS; SUBCUTANEOUS at 22:37

## 2024-12-28 RX ADMIN — HEPARIN SODIUM 5000 UNITS: 5000 INJECTION INTRAVENOUS; SUBCUTANEOUS at 07:59

## 2024-12-28 RX ADMIN — AMLODIPINE BESYLATE 10 MG: 5 TABLET ORAL at 08:58

## 2024-12-28 RX ADMIN — HEPARIN SODIUM 5000 UNITS: 5000 INJECTION INTRAVENOUS; SUBCUTANEOUS at 14:04

## 2024-12-28 RX ADMIN — SEVELAMER CARBONATE 800 MG: 800 TABLET, FILM COATED ORAL at 17:39

## 2024-12-28 RX ADMIN — CINACALCET HYDROCHLORIDE 90 MG: 30 TABLET, FILM COATED ORAL at 08:59

## 2024-12-28 RX ADMIN — HYDRALAZINE HYDROCHLORIDE 100 MG: 50 TABLET ORAL at 22:49

## 2024-12-28 RX ADMIN — DOXYCYCLINE 100 MG: 100 CAPSULE ORAL at 22:37

## 2024-12-28 RX ADMIN — HYDRALAZINE HYDROCHLORIDE 100 MG: 50 TABLET ORAL at 07:59

## 2024-12-28 RX ADMIN — OXYCODONE HYDROCHLORIDE AND ACETAMINOPHEN 1 TABLET: 5; 325 TABLET ORAL at 22:50

## 2024-12-28 RX ADMIN — SODIUM CHLORIDE, PRESERVATIVE FREE 10 ML: 5 INJECTION INTRAVENOUS at 09:10

## 2024-12-28 RX ADMIN — SODIUM CHLORIDE, PRESERVATIVE FREE 10 ML: 5 INJECTION INTRAVENOUS at 22:37

## 2024-12-28 RX ADMIN — ACETAMINOPHEN 650 MG: 325 TABLET ORAL at 17:43

## 2024-12-28 RX ADMIN — CALCITRIOL CAPSULES 0.25 MCG 0.5 MCG: 0.25 CAPSULE ORAL at 08:58

## 2024-12-28 ASSESSMENT — PAIN DESCRIPTION - DESCRIPTORS
DESCRIPTORS: SHARP
DESCRIPTORS: SHARP
DESCRIPTORS: ACHING

## 2024-12-28 ASSESSMENT — PAIN SCALES - GENERAL
PAINLEVEL_OUTOF10: 0
PAINLEVEL_OUTOF10: 8
PAINLEVEL_OUTOF10: 0
PAINLEVEL_OUTOF10: 7
PAINLEVEL_OUTOF10: 7
PAINLEVEL_OUTOF10: 5

## 2024-12-28 ASSESSMENT — PAIN DESCRIPTION - LOCATION
LOCATION: SCROTUM
LOCATION: SACRUM
LOCATION: GROIN
LOCATION: GROIN

## 2024-12-28 ASSESSMENT — PAIN - FUNCTIONAL ASSESSMENT
PAIN_FUNCTIONAL_ASSESSMENT: ACTIVITIES ARE NOT PREVENTED
PAIN_FUNCTIONAL_ASSESSMENT: ACTIVITIES ARE NOT PREVENTED

## 2024-12-28 NOTE — PLAN OF CARE
Problem: Discharge Planning  Goal: Discharge to home or other facility with appropriate resources  Outcome: Progressing     Problem: Chronic Conditions and Co-morbidities  Goal: Patient's chronic conditions and co-morbidity symptoms are monitored and maintained or improved  Outcome: Progressing     Problem: Pain  Goal: Verbalizes/displays adequate comfort level or baseline comfort level  Outcome: Progressing  Flowsheets (Taken 12/28/2024 0800)  Verbalizes/displays adequate comfort level or baseline comfort level: Encourage patient to monitor pain and request assistance     Problem: Safety - Adult  Goal: Free from fall injury  Outcome: Progressing

## 2024-12-29 LAB
ALBUMIN SERPL-MCNC: 2.9 G/DL (ref 3.4–5)
ANION GAP SERPL CALC-SCNC: 9 MMOL/L (ref 3–18)
BUN SERPL-MCNC: 46 MG/DL (ref 7–18)
BUN/CREAT SERPL: 4 (ref 12–20)
CALCIUM SERPL-MCNC: 7.7 MG/DL (ref 8.5–10.1)
CHLORIDE SERPL-SCNC: 103 MMOL/L (ref 100–111)
CO2 SERPL-SCNC: 24 MMOL/L (ref 21–32)
CREAT SERPL-MCNC: 10.8 MG/DL (ref 0.6–1.3)
GLUCOSE SERPL-MCNC: 90 MG/DL (ref 74–99)
PHOSPHATE SERPL-MCNC: 7.1 MG/DL (ref 2.5–4.9)
POTASSIUM SERPL-SCNC: 3.4 MMOL/L (ref 3.5–5.5)
SODIUM SERPL-SCNC: 136 MMOL/L (ref 136–145)

## 2024-12-29 PROCEDURE — 36415 COLL VENOUS BLD VENIPUNCTURE: CPT

## 2024-12-29 PROCEDURE — 6360000002 HC RX W HCPCS: Performed by: INTERNAL MEDICINE

## 2024-12-29 PROCEDURE — 1100000003 HC PRIVATE W/ TELEMETRY

## 2024-12-29 PROCEDURE — 6370000000 HC RX 637 (ALT 250 FOR IP): Performed by: INTERNAL MEDICINE

## 2024-12-29 PROCEDURE — 2500000003 HC RX 250 WO HCPCS: Performed by: INTERNAL MEDICINE

## 2024-12-29 PROCEDURE — 80069 RENAL FUNCTION PANEL: CPT

## 2024-12-29 RX ORDER — CIPROFLOXACIN 2 MG/ML
400 INJECTION, SOLUTION INTRAVENOUS EVERY 12 HOURS
Status: DISCONTINUED | OUTPATIENT
Start: 2024-12-29 | End: 2024-12-29

## 2024-12-29 RX ORDER — LEVOFLOXACIN 5 MG/ML
500 INJECTION, SOLUTION INTRAVENOUS EVERY 24 HOURS
Status: DISCONTINUED | OUTPATIENT
Start: 2024-12-29 | End: 2024-12-30 | Stop reason: DRUGHIGH

## 2024-12-29 RX ADMIN — HEPARIN SODIUM 5000 UNITS: 5000 INJECTION INTRAVENOUS; SUBCUTANEOUS at 14:00

## 2024-12-29 RX ADMIN — DOXYCYCLINE 100 MG: 100 CAPSULE ORAL at 20:38

## 2024-12-29 RX ADMIN — SODIUM CHLORIDE, PRESERVATIVE FREE 10 ML: 5 INJECTION INTRAVENOUS at 20:39

## 2024-12-29 RX ADMIN — HEPARIN SODIUM 5000 UNITS: 5000 INJECTION INTRAVENOUS; SUBCUTANEOUS at 22:33

## 2024-12-29 RX ADMIN — CALCITRIOL CAPSULES 0.25 MCG 0.5 MCG: 0.25 CAPSULE ORAL at 08:11

## 2024-12-29 RX ADMIN — SEVELAMER CARBONATE 800 MG: 800 TABLET, FILM COATED ORAL at 08:12

## 2024-12-29 RX ADMIN — LEVOFLOXACIN 500 MG: 5 INJECTION, SOLUTION INTRAVENOUS at 16:09

## 2024-12-29 RX ADMIN — SODIUM CHLORIDE, PRESERVATIVE FREE 10 ML: 5 INJECTION INTRAVENOUS at 08:12

## 2024-12-29 RX ADMIN — HEPARIN SODIUM 5000 UNITS: 5000 INJECTION INTRAVENOUS; SUBCUTANEOUS at 07:07

## 2024-12-29 RX ADMIN — CINACALCET HYDROCHLORIDE 90 MG: 30 TABLET, FILM COATED ORAL at 08:11

## 2024-12-29 RX ADMIN — OXYCODONE HYDROCHLORIDE AND ACETAMINOPHEN 1 TABLET: 5; 325 TABLET ORAL at 12:28

## 2024-12-29 RX ADMIN — OXYCODONE HYDROCHLORIDE AND ACETAMINOPHEN 1 TABLET: 5; 325 TABLET ORAL at 20:39

## 2024-12-29 RX ADMIN — SEVELAMER CARBONATE 800 MG: 800 TABLET, FILM COATED ORAL at 16:06

## 2024-12-29 RX ADMIN — CYANOCOBALAMIN TAB 1000 MCG 1000 MCG: 1000 TAB at 08:12

## 2024-12-29 RX ADMIN — SEVELAMER CARBONATE 800 MG: 800 TABLET, FILM COATED ORAL at 12:20

## 2024-12-29 RX ADMIN — DOXYCYCLINE 100 MG: 100 CAPSULE ORAL at 08:11

## 2024-12-29 RX ADMIN — LABETALOL HYDROCHLORIDE 200 MG: 200 TABLET, FILM COATED ORAL at 20:38

## 2024-12-29 RX ADMIN — LABETALOL HYDROCHLORIDE 200 MG: 200 TABLET, FILM COATED ORAL at 08:12

## 2024-12-29 RX ADMIN — HYDRALAZINE HYDROCHLORIDE 100 MG: 50 TABLET ORAL at 22:33

## 2024-12-29 RX ADMIN — HYDRALAZINE HYDROCHLORIDE 100 MG: 50 TABLET ORAL at 07:07

## 2024-12-29 ASSESSMENT — PAIN SCALES - GENERAL
PAINLEVEL_OUTOF10: 7
PAINLEVEL_OUTOF10: 0
PAINLEVEL_OUTOF10: 8
PAINLEVEL_OUTOF10: 3
PAINLEVEL_OUTOF10: 0
PAINLEVEL_OUTOF10: 8

## 2024-12-29 ASSESSMENT — PAIN DESCRIPTION - DESCRIPTORS
DESCRIPTORS: THROBBING
DESCRIPTORS: ACHING

## 2024-12-29 ASSESSMENT — PAIN DESCRIPTION - ORIENTATION: ORIENTATION: MID

## 2024-12-29 ASSESSMENT — PAIN DESCRIPTION - LOCATION
LOCATION: SCROTUM
LOCATION: SCROTUM
LOCATION: PERINEUM

## 2024-12-29 ASSESSMENT — PAIN DESCRIPTION - PAIN TYPE
TYPE: ACUTE PAIN
TYPE: ACUTE PAIN

## 2024-12-29 ASSESSMENT — PAIN DESCRIPTION - FREQUENCY: FREQUENCY: INTERMITTENT

## 2024-12-29 ASSESSMENT — PAIN DESCRIPTION - ONSET: ONSET: GRADUAL

## 2024-12-29 NOTE — PLAN OF CARE
Problem: Chronic Conditions and Co-morbidities  Goal: Patient's chronic conditions and co-morbidity symptoms are monitored and maintained or improved  Outcome: Progressing  Flowsheets (Taken 12/29/2024 0800)  Care Plan - Patient's Chronic Conditions and Co-Morbidity Symptoms are Monitored and Maintained or Improved:   Monitor and assess patient's chronic conditions and comorbid symptoms for stability, deterioration, or improvement   Collaborate with multidisciplinary team to address chronic and comorbid conditions and prevent exacerbation or deterioration   Update acute care plan with appropriate goals if chronic or comorbid symptoms are exacerbated and prevent overall improvement and discharge     Problem: Discharge Planning  Goal: Discharge to home or other facility with appropriate resources  Outcome: Progressing  Flowsheets (Taken 12/29/2024 0800)  Discharge to home or other facility with appropriate resources:   Identify barriers to discharge with patient and caregiver   Arrange for needed discharge resources and transportation as appropriate   Identify discharge learning needs (meds, wound care, etc)     Problem: Pain  Goal: Verbalizes/displays adequate comfort level or baseline comfort level  Outcome: Progressing     Problem: Safety - Adult  Goal: Free from fall injury  Outcome: Progressing

## 2024-12-30 ENCOUNTER — ANESTHESIA EVENT (OUTPATIENT)
Facility: HOSPITAL | Age: 54
DRG: 291 | End: 2024-12-30
Payer: MEDICARE

## 2024-12-30 LAB
ALBUMIN SERPL-MCNC: 2.8 G/DL (ref 3.4–5)
ANION GAP SERPL CALC-SCNC: 10 MMOL/L (ref 3–18)
BUN SERPL-MCNC: 51 MG/DL (ref 7–18)
BUN/CREAT SERPL: 4 (ref 12–20)
CALCIUM SERPL-MCNC: 7.3 MG/DL (ref 8.5–10.1)
CHLORIDE SERPL-SCNC: 103 MMOL/L (ref 100–111)
CO2 SERPL-SCNC: 22 MMOL/L (ref 21–32)
CREAT SERPL-MCNC: 12.3 MG/DL (ref 0.6–1.3)
GLUCOSE SERPL-MCNC: 91 MG/DL (ref 74–99)
PHOSPHATE SERPL-MCNC: 7 MG/DL (ref 2.5–4.9)
POTASSIUM SERPL-SCNC: 3.8 MMOL/L (ref 3.5–5.5)
PSA SERPL-MCNC: 0.1 NG/ML (ref 0–4)
SODIUM SERPL-SCNC: 135 MMOL/L (ref 136–145)

## 2024-12-30 PROCEDURE — 6360000002 HC RX W HCPCS: Performed by: INTERNAL MEDICINE

## 2024-12-30 PROCEDURE — 2500000003 HC RX 250 WO HCPCS: Performed by: INTERNAL MEDICINE

## 2024-12-30 PROCEDURE — 90935 HEMODIALYSIS ONE EVALUATION: CPT

## 2024-12-30 PROCEDURE — 84153 ASSAY OF PSA TOTAL: CPT

## 2024-12-30 PROCEDURE — 36415 COLL VENOUS BLD VENIPUNCTURE: CPT

## 2024-12-30 PROCEDURE — 1100000003 HC PRIVATE W/ TELEMETRY

## 2024-12-30 PROCEDURE — 6370000000 HC RX 637 (ALT 250 FOR IP): Performed by: INTERNAL MEDICINE

## 2024-12-30 PROCEDURE — 80069 RENAL FUNCTION PANEL: CPT

## 2024-12-30 RX ORDER — DROPERIDOL 2.5 MG/ML
0.62 INJECTION, SOLUTION INTRAMUSCULAR; INTRAVENOUS
Status: CANCELLED | OUTPATIENT
Start: 2024-12-30 | End: 2024-12-31

## 2024-12-30 RX ORDER — ONDANSETRON 2 MG/ML
4 INJECTION INTRAMUSCULAR; INTRAVENOUS
Status: CANCELLED | OUTPATIENT
Start: 2024-12-30 | End: 2024-12-31

## 2024-12-30 RX ORDER — HYDROMORPHONE HYDROCHLORIDE 1 MG/ML
0.5 INJECTION, SOLUTION INTRAMUSCULAR; INTRAVENOUS; SUBCUTANEOUS EVERY 5 MIN PRN
Status: CANCELLED | OUTPATIENT
Start: 2024-12-30

## 2024-12-30 RX ORDER — NALOXONE HYDROCHLORIDE 0.4 MG/ML
INJECTION, SOLUTION INTRAMUSCULAR; INTRAVENOUS; SUBCUTANEOUS PRN
Status: CANCELLED | OUTPATIENT
Start: 2024-12-30

## 2024-12-30 RX ORDER — OXYCODONE HYDROCHLORIDE 5 MG/1
5 TABLET ORAL
Status: CANCELLED | OUTPATIENT
Start: 2024-12-30 | End: 2024-12-31

## 2024-12-30 RX ORDER — DIPHENHYDRAMINE HYDROCHLORIDE 50 MG/ML
12.5 INJECTION INTRAMUSCULAR; INTRAVENOUS
Status: CANCELLED | OUTPATIENT
Start: 2024-12-30 | End: 2024-12-31

## 2024-12-30 RX ORDER — SODIUM CHLORIDE 9 MG/ML
INJECTION, SOLUTION INTRAVENOUS PRN
Status: CANCELLED | OUTPATIENT
Start: 2024-12-30

## 2024-12-30 RX ORDER — LEVOFLOXACIN 5 MG/ML
500 INJECTION, SOLUTION INTRAVENOUS
Status: DISCONTINUED | OUTPATIENT
Start: 2025-01-01 | End: 2024-12-31

## 2024-12-30 RX ORDER — SODIUM CHLORIDE 0.9 % (FLUSH) 0.9 %
5-40 SYRINGE (ML) INJECTION EVERY 12 HOURS SCHEDULED
Status: CANCELLED | OUTPATIENT
Start: 2024-12-30

## 2024-12-30 RX ORDER — SODIUM CHLORIDE, SODIUM LACTATE, POTASSIUM CHLORIDE, CALCIUM CHLORIDE 600; 310; 30; 20 MG/100ML; MG/100ML; MG/100ML; MG/100ML
INJECTION, SOLUTION INTRAVENOUS CONTINUOUS
Status: CANCELLED | OUTPATIENT
Start: 2024-12-30

## 2024-12-30 RX ORDER — IPRATROPIUM BROMIDE AND ALBUTEROL SULFATE 2.5; .5 MG/3ML; MG/3ML
1 SOLUTION RESPIRATORY (INHALATION)
Status: CANCELLED | OUTPATIENT
Start: 2024-12-30 | End: 2024-12-31

## 2024-12-30 RX ORDER — SODIUM CHLORIDE 0.9 % (FLUSH) 0.9 %
5-40 SYRINGE (ML) INJECTION PRN
Status: CANCELLED | OUTPATIENT
Start: 2024-12-30

## 2024-12-30 RX ORDER — LABETALOL HYDROCHLORIDE 5 MG/ML
10 INJECTION, SOLUTION INTRAVENOUS
Status: CANCELLED | OUTPATIENT
Start: 2024-12-30

## 2024-12-30 RX ORDER — HEPARIN SODIUM 1000 [USP'U]/ML
3200 INJECTION, SOLUTION INTRAVENOUS; SUBCUTANEOUS
Status: DISCONTINUED | OUTPATIENT
Start: 2024-12-30 | End: 2025-01-02 | Stop reason: HOSPADM

## 2024-12-30 RX ORDER — FENTANYL CITRATE 50 UG/ML
25 INJECTION, SOLUTION INTRAMUSCULAR; INTRAVENOUS EVERY 5 MIN PRN
Status: CANCELLED | OUTPATIENT
Start: 2024-12-30

## 2024-12-30 RX ORDER — SODIUM CHLORIDE 9 MG/ML
INJECTION, SOLUTION INTRAVENOUS CONTINUOUS
Status: CANCELLED | OUTPATIENT
Start: 2024-12-30

## 2024-12-30 RX ADMIN — CINACALCET HYDROCHLORIDE 90 MG: 30 TABLET, FILM COATED ORAL at 09:37

## 2024-12-30 RX ADMIN — OXYCODONE HYDROCHLORIDE AND ACETAMINOPHEN 1 TABLET: 5; 325 TABLET ORAL at 21:25

## 2024-12-30 RX ADMIN — HYDRALAZINE HYDROCHLORIDE 100 MG: 50 TABLET ORAL at 05:40

## 2024-12-30 RX ADMIN — OXYCODONE HYDROCHLORIDE AND ACETAMINOPHEN 1 TABLET: 5; 325 TABLET ORAL at 05:45

## 2024-12-30 RX ADMIN — LEVOFLOXACIN 500 MG: 5 INJECTION, SOLUTION INTRAVENOUS at 16:11

## 2024-12-30 RX ADMIN — DOXYCYCLINE 100 MG: 100 CAPSULE ORAL at 21:25

## 2024-12-30 RX ADMIN — CALCITRIOL CAPSULES 0.25 MCG 0.5 MCG: 0.25 CAPSULE ORAL at 09:36

## 2024-12-30 RX ADMIN — BISACODYL 5 MG: 5 TABLET, COATED ORAL at 09:45

## 2024-12-30 RX ADMIN — SODIUM CHLORIDE, PRESERVATIVE FREE 10 ML: 5 INJECTION INTRAVENOUS at 09:37

## 2024-12-30 RX ADMIN — DOXYCYCLINE 100 MG: 100 CAPSULE ORAL at 09:37

## 2024-12-30 RX ADMIN — HEPARIN SODIUM 3200 UNITS: 1000 INJECTION INTRAVENOUS; SUBCUTANEOUS at 15:15

## 2024-12-30 RX ADMIN — AMLODIPINE BESYLATE 10 MG: 5 TABLET ORAL at 09:37

## 2024-12-30 RX ADMIN — CYANOCOBALAMIN TAB 1000 MCG 1000 MCG: 1000 TAB at 09:36

## 2024-12-30 RX ADMIN — ACETAMINOPHEN 650 MG: 325 TABLET ORAL at 09:37

## 2024-12-30 RX ADMIN — SEVELAMER CARBONATE 800 MG: 800 TABLET, FILM COATED ORAL at 09:36

## 2024-12-30 RX ADMIN — SODIUM CHLORIDE, PRESERVATIVE FREE 10 ML: 5 INJECTION INTRAVENOUS at 21:25

## 2024-12-30 RX ADMIN — HEPARIN SODIUM 5000 UNITS: 5000 INJECTION INTRAVENOUS; SUBCUTANEOUS at 05:40

## 2024-12-30 RX ADMIN — LABETALOL HYDROCHLORIDE 200 MG: 200 TABLET, FILM COATED ORAL at 21:25

## 2024-12-30 ASSESSMENT — PAIN DESCRIPTION - LOCATION
LOCATION: SCROTUM

## 2024-12-30 ASSESSMENT — PAIN SCALES - GENERAL
PAINLEVEL_OUTOF10: 8
PAINLEVEL_OUTOF10: 8
PAINLEVEL_OUTOF10: 10
PAINLEVEL_OUTOF10: 0
PAINLEVEL_OUTOF10: 8
PAINLEVEL_OUTOF10: 4
PAINLEVEL_OUTOF10: 0
PAINLEVEL_OUTOF10: 4
PAINLEVEL_OUTOF10: 0

## 2024-12-30 ASSESSMENT — PAIN DESCRIPTION - DESCRIPTORS
DESCRIPTORS: SORE
DESCRIPTORS: THROBBING

## 2024-12-30 ASSESSMENT — PAIN - FUNCTIONAL ASSESSMENT: PAIN_FUNCTIONAL_ASSESSMENT: ACTIVITIES ARE NOT PREVENTED

## 2024-12-30 ASSESSMENT — LIFESTYLE VARIABLES: SMOKING_STATUS: 0

## 2024-12-30 ASSESSMENT — PAIN DESCRIPTION - ONSET: ONSET: GRADUAL

## 2024-12-30 ASSESSMENT — ENCOUNTER SYMPTOMS: SHORTNESS OF BREATH: 1

## 2024-12-30 ASSESSMENT — PAIN DESCRIPTION - FREQUENCY: FREQUENCY: INTERMITTENT

## 2024-12-30 ASSESSMENT — PAIN DESCRIPTION - PAIN TYPE: TYPE: ACUTE PAIN

## 2024-12-30 NOTE — PLAN OF CARE
Signed         INTERVENTION:  HEMODYNAMIC STABILIZATION  MAINTAIN BP WNL WHILE ON HD.     INTERVENTION:  FLUID MANAGEMENT  WILL ATTEMPT 3000 ML TOTAL FLUID REMOVAL AS TOLERATED.     INTERVENTION:  METABOLIC/ELECTROLYTE MANAGEMENT  3.0 POTASSIUM 2.5 CALCIUM DIALYSATE USED WITH HD TODAY.     INTERVENTION:  HEMODIALYSIS ACCESS SITE MANAGEMENT  RIGHT SIDE CVC USING ASEPTIC TECHNIQUE.     GOAL:  SIGNS AND SYMPTOMS OF LISTED POTENTIAL PROBLEMS WILL BE ABSENT OR MANAGEABLE.     OUTCOME:  PROGRESSING.     HD PLANNED FOR 3 HOURS TODAY AS PER PATIENTS REQUEST DUE TO UPCOMING PROCEDURE THIS PM.    Problem: Chronic Conditions and Co-morbidities  Goal: Patient's chronic conditions and co-morbidity symptoms are monitored and maintained or improved  12/30/2024 1151 by Annamaria Branham, RN  Outcome: Progressing

## 2024-12-30 NOTE — PERIOP NOTE
Pt underwent dialysis therefore surgery may be canceled for today. Made [address] aware. Advised to keep pt NPO.

## 2024-12-30 NOTE — PERIOP NOTE
TRANSFER - IN REPORT:    Verbal report received from Cayla LUI on Evan Huizar  being received from  for routine progression of patient care      Report consisted of patient's Situation, Background, Assessment and   Recommendations(SBAR).     Information from the following report(s) Nurse Handoff Report was reviewed with the receiving nurse.    Opportunity for questions and clarification was provided.      Assessment completed upon patient's arrival to unit and care assumed.

## 2024-12-30 NOTE — CONSULTS
Department of Urology  Attending physician Consult Note      Reason for Consult:  genital abscess  Requesting Physician:  Sachi DONAHUE    CHIEF COMPLAINT:  perineal pain    History Obtained From:  patient    HISTORY OF PRESENT ILLNESS:                The patient is a 54 y.o. male with significant past medical history of PMH of ESRD, MSSA MV endocarditis, HTN, HFrEF, T2DM  Right BKA who presents after missing 1 week of HD.   Patient was noted to c/o of perineal pain. Scrotal US done, see below does  not show any scrotal abscess. Urology was consulted because of suspected scrotal abscess.     Scrotal us 12/26  IMPRESSION:     No evidence for testicular torsion  Nonspecific bilateral testicular heterogeneity  Nonspecific right epididymal heterogeneity.    CT scan a/p 12/26  ADDITIONAL COMMENTS: No evidence of peritoneal pathology. Base of the penis and  anterior perineum are not imaged.     IMPRESSION:     1. Rectal underdistention versus infectious or inflammatory proctitis.  2. No abscess. Incomplete evaluation of the anterior peritoneum.  3. Decreased pulmonary edema.      Past Medical History:        Diagnosis Date    Chronic kidney disease     Diabetes (HCC)     Heart failure (HCC)     Hemodialysis patient (HCC)     Hypertension     Psychiatric illness 4/22/2023    Sickle cell trait (HCC)      Past Surgical History:        Procedure Laterality Date    IR NONTUNNELED VASCULAR CATHETER > 5 YEARS  6/1/2022    IR NONTUNNELED VASCULAR CATHETER 6/1/2022 [facility]    IR NONTUNNELED VASCULAR CATHETER > 5 YEARS  6/1/2022    IR NONTUNNELED VASCULAR CATHETER > 5 YEARS  2/8/2023    IR NONTUNNELED VASCULAR CATHETER 2/8/2023 [facility]    IR THRMB/INFUSION DIALYSIS CIRCUIT  6/3/2022    IR THRMB/INFUSION DIAYSIS CIRCUIT 6/3/2022 [facility]    IR THRMB/INFUSION DIALYSIS CIRCUIT  6/3/2022    IR TUNNELED CVC PLACE WO SQ PORT/PUMP > 5 YEARS  6/26/2022    IR TUNNELED CATHETER PLACEMENT GREATER THAN 5 YEARS

## 2024-12-30 NOTE — PLAN OF CARE
Problem: Chronic Conditions and Co-morbidities  Goal: Patient's chronic conditions and co-morbidity symptoms are monitored and maintained or improved  12/29/2024 2108 by Mena Loza RN  Outcome: Progressing  12/29/2024 1051 by Farooq Taylor RN  Outcome: Progressing  Flowsheets (Taken 12/29/2024 0800)  Care Plan - Patient's Chronic Conditions and Co-Morbidity Symptoms are Monitored and Maintained or Improved:   Monitor and assess patient's chronic conditions and comorbid symptoms for stability, deterioration, or improvement   Collaborate with multidisciplinary team to address chronic and comorbid conditions and prevent exacerbation or deterioration   Update acute care plan with appropriate goals if chronic or comorbid symptoms are exacerbated and prevent overall improvement and discharge     Problem: Discharge Planning  Goal: Discharge to home or other facility with appropriate resources  12/29/2024 2108 by Mena Loza RN  Outcome: Progressing  12/29/2024 1051 by Farooq Taylor RN  Outcome: Progressing  Flowsheets (Taken 12/29/2024 0800)  Discharge to home or other facility with appropriate resources:   Identify barriers to discharge with patient and caregiver   Arrange for needed discharge resources and transportation as appropriate   Identify discharge learning needs (meds, wound care, etc)     Problem: Pain  Goal: Verbalizes/displays adequate comfort level or baseline comfort level  12/29/2024 2108 by Mena Loza RN  Outcome: Progressing  12/29/2024 1051 by Farooq Taylor RN  Outcome: Progressing     Problem: Safety - Adult  Goal: Free from fall injury  12/29/2024 2108 by Mena Loza RN  Outcome: Progressing  12/29/2024 1051 by Farooq Taylor RN  Outcome: Progressing     Problem: ABCDS Injury Assessment  Goal: Absence of physical injury  Outcome: Progressing

## 2024-12-30 NOTE — ANESTHESIA PRE PROCEDURE
full  Mouth opening: > = 3 FB   Dental:    (+) poor dentition      Pulmonary:   (+)   shortness of breath:             (-) not a current smoker  Sleep apnea: former.                         Cardiovascular:    (+) hypertension:, CHF: diastolic               ROS comment: BEATRIZ 1/2023:  Normal LV systolic function, EF 55 - 60%   · Left atrium is severely dilated. No left atrial thrombus is seen.   · There are multiple nodular echogenic masses / vegetations on the mitral   valve mostly on the atrial side but some extending into the ventricular   side. The largest vegetations are about 8 mm X 9 mm, another one 11 mm X 5   mm. There is a greater concentration of vegetations on the posterior   mitral valve leaflet (especially P2 segment) with restricted leaflet   mobility. Compared to previous BEATRIZ on 10/13/2022, slightly greater burden   of vegetations on this study   · There is an echogenic mass (thrombus/vegetation) extending from the SVC   into the RA. This is likely her into a central line. Compared to previous   BEATRIZ on 10/13/2022, this is new   · The pulmonic valve is thickened with normal leaflet mobility.   · Trace tricuspid regurgitation.          Neuro/Psych:   (+) psychiatric history:            GI/Hepatic/Renal:   (+) renal disease: ESRD and dialysis          Endo/Other:    (+) Diabetes.                 Abdominal:             Vascular:          Other Findings:             Anesthesia Plan      MAC     ASA 3         continuous noninvasive hemodynamic monitor    Anesthetic plan and risks discussed with patient.      Plan discussed with CRNA.                    Jaycob Wright MD   12/30/2024

## 2024-12-31 ENCOUNTER — ANESTHESIA (OUTPATIENT)
Facility: HOSPITAL | Age: 54
DRG: 291 | End: 2024-12-31
Payer: MEDICARE

## 2024-12-31 PROBLEM — L02.215 PERINEAL ABSCESS: Status: ACTIVE | Noted: 2024-12-31

## 2024-12-31 LAB
ALBUMIN SERPL-MCNC: 3 G/DL (ref 3.4–5)
ANION GAP SERPL CALC-SCNC: 7 MMOL/L (ref 3–18)
BUN SERPL-MCNC: 31 MG/DL (ref 7–18)
BUN/CREAT SERPL: 3 (ref 12–20)
CALCIUM SERPL-MCNC: 7.9 MG/DL (ref 8.5–10.1)
CHLORIDE SERPL-SCNC: 104 MMOL/L (ref 100–111)
CO2 SERPL-SCNC: 27 MMOL/L (ref 21–32)
CREAT SERPL-MCNC: 9.02 MG/DL (ref 0.6–1.3)
GLUCOSE BLD STRIP.AUTO-MCNC: 89 MG/DL (ref 70–110)
GLUCOSE SERPL-MCNC: 102 MG/DL (ref 74–99)
PHOSPHATE SERPL-MCNC: 5.3 MG/DL (ref 2.5–4.9)
POTASSIUM SERPL-SCNC: 3.9 MMOL/L (ref 3.5–5.5)
SODIUM SERPL-SCNC: 138 MMOL/L (ref 136–145)

## 2024-12-31 PROCEDURE — 6360000002 HC RX W HCPCS: Performed by: SURGERY

## 2024-12-31 PROCEDURE — 3700000001 HC ADD 15 MINUTES (ANESTHESIA): Performed by: SURGERY

## 2024-12-31 PROCEDURE — 36415 COLL VENOUS BLD VENIPUNCTURE: CPT

## 2024-12-31 PROCEDURE — 87075 CULTR BACTERIA EXCEPT BLOOD: CPT

## 2024-12-31 PROCEDURE — 82962 GLUCOSE BLOOD TEST: CPT

## 2024-12-31 PROCEDURE — 3600000002 HC SURGERY LEVEL 2 BASE: Performed by: SURGERY

## 2024-12-31 PROCEDURE — 3600000012 HC SURGERY LEVEL 2 ADDTL 15MIN: Performed by: SURGERY

## 2024-12-31 PROCEDURE — 6370000000 HC RX 637 (ALT 250 FOR IP): Performed by: INTERNAL MEDICINE

## 2024-12-31 PROCEDURE — 6360000002 HC RX W HCPCS: Performed by: NURSE ANESTHETIST, CERTIFIED REGISTERED

## 2024-12-31 PROCEDURE — 87070 CULTURE OTHR SPECIMN AEROBIC: CPT

## 2024-12-31 PROCEDURE — 2709999900 HC NON-CHARGEABLE SUPPLY: Performed by: SURGERY

## 2024-12-31 PROCEDURE — 2500000003 HC RX 250 WO HCPCS: Performed by: INTERNAL MEDICINE

## 2024-12-31 PROCEDURE — 87205 SMEAR GRAM STAIN: CPT

## 2024-12-31 PROCEDURE — 80069 RENAL FUNCTION PANEL: CPT

## 2024-12-31 PROCEDURE — 1100000003 HC PRIVATE W/ TELEMETRY

## 2024-12-31 PROCEDURE — 3700000000 HC ANESTHESIA ATTENDED CARE: Performed by: SURGERY

## 2024-12-31 PROCEDURE — 2500000003 HC RX 250 WO HCPCS: Performed by: SURGERY

## 2024-12-31 PROCEDURE — 0J9B0ZZ DRAINAGE OF PERINEUM SUBCUTANEOUS TISSUE AND FASCIA, OPEN APPROACH: ICD-10-PCS | Performed by: SURGERY

## 2024-12-31 RX ORDER — PROPOFOL 10 MG/ML
INJECTION, EMULSION INTRAVENOUS
Status: DISCONTINUED | OUTPATIENT
Start: 2024-12-31 | End: 2024-12-31 | Stop reason: SDUPTHER

## 2024-12-31 RX ORDER — MAGNESIUM HYDROXIDE 1200 MG/15ML
LIQUID ORAL CONTINUOUS PRN
Status: COMPLETED | OUTPATIENT
Start: 2024-12-31 | End: 2024-12-31

## 2024-12-31 RX ORDER — FENTANYL CITRATE 50 UG/ML
INJECTION, SOLUTION INTRAMUSCULAR; INTRAVENOUS
Status: DISCONTINUED | OUTPATIENT
Start: 2024-12-31 | End: 2024-12-31 | Stop reason: SDUPTHER

## 2024-12-31 RX ORDER — LEVOFLOXACIN 5 MG/ML
250 INJECTION, SOLUTION INTRAVENOUS
Status: DISCONTINUED | OUTPATIENT
Start: 2025-01-01 | End: 2025-01-01

## 2024-12-31 RX ORDER — BUPIVACAINE HYDROCHLORIDE 2.5 MG/ML
INJECTION, SOLUTION EPIDURAL; INFILTRATION; INTRACAUDAL PRN
Status: DISCONTINUED | OUTPATIENT
Start: 2024-12-31 | End: 2024-12-31 | Stop reason: ALTCHOICE

## 2024-12-31 RX ADMIN — PROPOFOL 180 MCG/KG/MIN: 10 INJECTION, EMULSION INTRAVENOUS at 07:34

## 2024-12-31 RX ADMIN — HYDRALAZINE HYDROCHLORIDE 100 MG: 50 TABLET ORAL at 20:30

## 2024-12-31 RX ADMIN — HYDRALAZINE HYDROCHLORIDE 100 MG: 50 TABLET ORAL at 12:56

## 2024-12-31 RX ADMIN — OXYCODONE HYDROCHLORIDE AND ACETAMINOPHEN 1 TABLET: 5; 325 TABLET ORAL at 09:18

## 2024-12-31 RX ADMIN — AMLODIPINE BESYLATE 10 MG: 5 TABLET ORAL at 09:18

## 2024-12-31 RX ADMIN — SEVELAMER CARBONATE 800 MG: 800 TABLET, FILM COATED ORAL at 12:56

## 2024-12-31 RX ADMIN — CYANOCOBALAMIN TAB 1000 MCG 1000 MCG: 1000 TAB at 09:19

## 2024-12-31 RX ADMIN — SODIUM CHLORIDE, PRESERVATIVE FREE 10 ML: 5 INJECTION INTRAVENOUS at 09:21

## 2024-12-31 RX ADMIN — FENTANYL CITRATE 25 MCG: 50 INJECTION, SOLUTION INTRAMUSCULAR; INTRAVENOUS at 07:39

## 2024-12-31 RX ADMIN — DOXYCYCLINE 100 MG: 100 CAPSULE ORAL at 20:30

## 2024-12-31 RX ADMIN — LABETALOL HYDROCHLORIDE 200 MG: 200 TABLET, FILM COATED ORAL at 09:21

## 2024-12-31 RX ADMIN — LABETALOL HYDROCHLORIDE 200 MG: 200 TABLET, FILM COATED ORAL at 20:30

## 2024-12-31 RX ADMIN — CALCITRIOL CAPSULES 0.25 MCG 0.5 MCG: 0.25 CAPSULE ORAL at 09:18

## 2024-12-31 RX ADMIN — CINACALCET HYDROCHLORIDE 90 MG: 30 TABLET, FILM COATED ORAL at 09:19

## 2024-12-31 RX ADMIN — OXYCODONE HYDROCHLORIDE AND ACETAMINOPHEN 1 TABLET: 5; 325 TABLET ORAL at 15:14

## 2024-12-31 RX ADMIN — PROPOFOL 50 MG: 10 INJECTION, EMULSION INTRAVENOUS at 07:35

## 2024-12-31 RX ADMIN — FENTANYL CITRATE 25 MCG: 50 INJECTION, SOLUTION INTRAMUSCULAR; INTRAVENOUS at 07:34

## 2024-12-31 RX ADMIN — DOXYCYCLINE 100 MG: 100 CAPSULE ORAL at 09:19

## 2024-12-31 RX ADMIN — PROPOFOL 50 MG: 10 INJECTION, EMULSION INTRAVENOUS at 07:40

## 2024-12-31 RX ADMIN — FENTANYL CITRATE 50 MCG: 50 INJECTION, SOLUTION INTRAMUSCULAR; INTRAVENOUS at 07:28

## 2024-12-31 ASSESSMENT — PAIN SCALES - GENERAL
PAINLEVEL_OUTOF10: 8
PAINLEVEL_OUTOF10: 8
PAINLEVEL_OUTOF10: 10

## 2024-12-31 ASSESSMENT — PAIN DESCRIPTION - LOCATION
LOCATION: INCISION
LOCATION: INCISION
LOCATION: INCISION;PENIS

## 2024-12-31 NOTE — BRIEF OP NOTE
Brief Postoperative Note      Patient: Evan Huizar  YOB: 1970  MRN: [id number]    Date of Procedure: 12/31/2024    Pre-Op diagnoses: Perineal abscess    Post-Op Diagnosis: Same       Procedure:  Drainage of perineal abscess incision and drainage    Surgeon(s):  Pavan De La Torre MD    Assistant:  * No surgical staff found *    Anesthesia: Monitor Anesthesia Care    Estimated Blood Loss (mL): Minimal    Complications: None    Specimens:   ID Type Source Tests Collected by Time Destination   1 : Perineal Abscess Swab Groin CULTURE, ANAEROBIC, CULTURE, WOUND (WITH GRAM STAIN) Pavan De La Torre MD 12/31/2024 0746        Implants:  * No implants in log *      Drains: * No LDAs found *    Findings:  Infection Present At Time Of Surgery (PATOS) (choose all levels that have infection present):  - Superficial Infection (skin/subcutaneous) present as evidenced by abscess  Other Findings: None    Electronically signed by Pavan De La Torre MD on 12/31/2024 at 7:48 AM

## 2024-12-31 NOTE — PLAN OF CARE
Problem: Chronic Conditions and Co-morbidities  Goal: Patient's chronic conditions and co-morbidity symptoms are monitored and maintained or improved  12/31/2024 0115 by Emma Banks, RN  Outcome: Progressing  12/30/2024 1151 by Annamaria Branham, RN  Outcome: Progressing     Problem: Pain  Goal: Verbalizes/displays adequate comfort level or baseline comfort level  Outcome: Progressing     Problem: Safety - Adult  Goal: Free from fall injury  Outcome: Progressing     Problem: ABCDS Injury Assessment  Goal: Absence of physical injury  Outcome: Progressing     Problem: Discharge Planning  Goal: Discharge to home or other facility with appropriate resources  Outcome: Progressing

## 2024-12-31 NOTE — ANESTHESIA POSTPROCEDURE EVALUATION
Department of Anesthesiology  Postprocedure Note    Patient: Evan Huizar  MRN: [id number]  YOB: 1970  Date of evaluation: 12/31/2024    Procedure Summary       Date: 12/31/24 Room / Location: [facility] MAIN 02 / [facility] MAIN OR    Anesthesia Start: 0728 Anesthesia Stop: 0815    Procedure: RECTAL PERIRECTAL INCISION AND DRAINAGE (Rectum) Diagnosis:       Abscess of scrotum      (Abscess of scrotum [N49.2])    Surgeons: Pavan De La Torre MD Responsible Provider: Jaycob Wright MD    Anesthesia Type: General ASA Status: 3            Anesthesia Type: General    Marichuy Phase I:      Marichuy Phase II:      Anesthesia Post Evaluation    Comments: Post-Anesthesia Evaluation and Assessment    Cardiovascular Function/Vital Signs/Pain Score  Vitals  BP: (!) 128/52  Temp: 98.8 °F (37.1 °C)  Temp Source: Oral  Pulse: 75  Respirations: 16  SpO2: 98 %  Height: 167.6 cm (5' 6\")  Weight - Scale: 76.9 kg (169 lb 8.5 oz)  Pain Level: 10     Patient is status post Procedure(s):  RECTAL PERIRECTAL INCISION AND DRAINAGE.    Nausea/Vomiting: Controlled.    Postoperative hydration reviewed and adequate.    Pain:  Managed.    Neurological Status:   At baseline.    Mental Status and Level of Consciousness: Arousable.    Pulmonary Status:   Adequate oxygenation and airway patent.    Complications related to anesthesia: None    Post-anesthesia assessment completed. No concerns. Local injected per surgeon. Pain score 10/10 pre-op and being actively treated.  Pt to go back to inpatient room with further pain management via primary team.     Patient has met all discharge requirements.    Signed By: Jaycob Wright MD    December 31, 2024     No notable events documented.

## 2024-12-31 NOTE — CONSULTS
This patient presents with a perineal abscess.  He was seen by the hospitalist and also urology for presumed scrotal abscess however this appears to be perineal.  I have reviewed his CT scan and chart.  The patient will be scheduled for potential incision and drainage today.  I have discussed the risk benefits alternatives to him he understands and wishes to proceed.  Please make the patient NPO.  And keep him off of anticoagulation.

## 2024-12-31 NOTE — PLAN OF CARE
Problem: Chronic Conditions and Co-morbidities  Goal: Patient's chronic conditions and co-morbidity symptoms are monitored and maintained or improved  12/31/2024 1420 by Manasa Maki RN  Outcome: Progressing  12/31/2024 0115 by Emma Banks RN  Outcome: Progressing     Problem: Discharge Planning  Goal: Discharge to home or other facility with appropriate resources  12/31/2024 1420 by Manasa Maki RN  Outcome: Progressing  12/31/2024 0115 by Emma Banks RN  Outcome: Progressing     Problem: Pain  Goal: Verbalizes/displays adequate comfort level or baseline comfort level  12/31/2024 1420 by Manasa Maki RN  Outcome: Progressing  Flowsheets (Taken 12/31/2024 0918)  Verbalizes/displays adequate comfort level or baseline comfort level:   Encourage patient to monitor pain and request assistance   Assess pain using appropriate pain scale   Administer analgesics based on type and severity of pain and evaluate response   Implement non-pharmacological measures as appropriate and evaluate response  12/31/2024 0115 by Emma Banks RN  Outcome: Progressing     Problem: Safety - Adult  Goal: Free from fall injury  12/31/2024 1420 by Manasa Maki RN  Outcome: Progressing  12/31/2024 0115 by Emma Banks RN  Outcome: Progressing     Problem: ABCDS Injury Assessment  Goal: Absence of physical injury  12/31/2024 1420 by Manasa Maki RN  Outcome: Progressing  12/31/2024 0115 by Emma Banks RN  Outcome: Progressing

## 2024-12-31 NOTE — PERIOP NOTE
TRANSFER - IN REPORT:    Verbal report received from Eduardo LUI on Evan Huizar  being received from [address] [place] [address] for ordered procedure      Report consisted of patient's Situation, Background, Assessment and   Recommendations(SBAR).     Information from the following report(s) MAR and Pre Procedure Checklist was reviewed with the receiving nurse.    Opportunity for questions and clarification was provided.      Assessment completed upon patient's arrival to unit and care assumed.

## 2024-12-31 NOTE — INTERVAL H&P NOTE
Update History & Physical    The patient's History and Physical of December 31, chart was reviewed with the patient and I examined the patient. There was no change. The surgical site was confirmed by the patient and me.     Plan: The risks, benefits, expected outcome, and alternative to the recommended procedure have been discussed with the patient. Patient understands and wants to proceed with the procedure.     Electronically signed by Pavan De La Torre MD on 12/31/2024 at 7:22 AM

## 2024-12-31 NOTE — OP NOTE
Operative Note      Patient: Evan Huizar  YOB: 1970  MRN: [id number]    Date of Procedure: 12/31/2024    Preop diagnosis: perineal abscess    Post-Op Diagnosis: Same       Procedure: Incision and drainage of perineal abscess    Surgeon(s):  Pavan De La Torre MD    Assistant:   * No surgical staff found *    Anesthesia: Monitor Anesthesia Care    Estimated Blood Loss (mL): Minimal    Complications: None    Specimens:   ID Type Source Tests Collected by Time Destination   1 : Perineal Abscess Swab Groin CULTURE, ANAEROBIC, CULTURE, WOUND (WITH GRAM STAIN) Pavan De La Torre MD 12/31/2024 0746        Implants:  * No implants in log *      Drains: * No LDAs found *    Findings:  Infection Present At Time Of Surgery (PATOS) (choose all levels that have infection present):  - Superficial Infection (skin/subcutaneous) present as evidenced by abscess  Other Findings: None    Detailed Description of Procedure:   Patient was placed in lithotomy position.  After sedation was accomplished by anesthesia 1% Marcaine mixed with quarter percent Marcaine was injected locally at the perineal abscess site.  An incision and drainage was done with a knife and a hemostat was inserted to break up the loculations and pus.  Cultures were taken.  The wound was irrigated with normal saline.  The wound was packed with iodophor gauze.  Sterile dressings were applied patient Toller procedure well    Electronically signed by Pavan De La Torre MD on 12/31/2024 at 7:52 AM

## 2025-01-01 PROCEDURE — 1100000003 HC PRIVATE W/ TELEMETRY

## 2025-01-01 PROCEDURE — 2500000003 HC RX 250 WO HCPCS: Performed by: INTERNAL MEDICINE

## 2025-01-01 PROCEDURE — 6370000000 HC RX 637 (ALT 250 FOR IP): Performed by: INTERNAL MEDICINE

## 2025-01-01 PROCEDURE — 6370000000 HC RX 637 (ALT 250 FOR IP): Performed by: HOSPITALIST

## 2025-01-01 PROCEDURE — 6360000002 HC RX W HCPCS: Performed by: INTERNAL MEDICINE

## 2025-01-01 PROCEDURE — 90935 HEMODIALYSIS ONE EVALUATION: CPT

## 2025-01-01 RX ORDER — LEVOFLOXACIN 250 MG/1
250 TABLET, FILM COATED ORAL EVERY OTHER DAY
Status: DISCONTINUED | OUTPATIENT
Start: 2025-01-01 | End: 2025-01-02 | Stop reason: HOSPADM

## 2025-01-01 RX ADMIN — CALCITRIOL CAPSULES 0.25 MCG 0.5 MCG: 0.25 CAPSULE ORAL at 09:15

## 2025-01-01 RX ADMIN — BISACODYL 5 MG: 5 TABLET, COATED ORAL at 09:15

## 2025-01-01 RX ADMIN — LEVOFLOXACIN 250 MG: 250 TABLET, FILM COATED ORAL at 18:49

## 2025-01-01 RX ADMIN — SEVELAMER CARBONATE 800 MG: 800 TABLET, FILM COATED ORAL at 17:28

## 2025-01-01 RX ADMIN — LABETALOL HYDROCHLORIDE 200 MG: 200 TABLET, FILM COATED ORAL at 21:32

## 2025-01-01 RX ADMIN — HEPARIN SODIUM 3200 UNITS: 1000 INJECTION INTRAVENOUS; SUBCUTANEOUS at 16:19

## 2025-01-01 RX ADMIN — HYDRALAZINE HYDROCHLORIDE 100 MG: 50 TABLET ORAL at 21:32

## 2025-01-01 RX ADMIN — CYANOCOBALAMIN TAB 1000 MCG 1000 MCG: 1000 TAB at 09:15

## 2025-01-01 RX ADMIN — CINACALCET HYDROCHLORIDE 90 MG: 30 TABLET, FILM COATED ORAL at 09:16

## 2025-01-01 RX ADMIN — SEVELAMER CARBONATE 800 MG: 800 TABLET, FILM COATED ORAL at 09:16

## 2025-01-01 RX ADMIN — SODIUM CHLORIDE, PRESERVATIVE FREE 10 ML: 5 INJECTION INTRAVENOUS at 09:17

## 2025-01-01 RX ADMIN — SODIUM CHLORIDE, PRESERVATIVE FREE 10 ML: 5 INJECTION INTRAVENOUS at 21:32

## 2025-01-01 RX ADMIN — HYDRALAZINE HYDROCHLORIDE 100 MG: 50 TABLET ORAL at 06:01

## 2025-01-01 ASSESSMENT — PAIN SCALES - GENERAL
PAINLEVEL_OUTOF10: 0
PAINLEVEL_OUTOF10: 0
PAINLEVEL_OUTOF10: 6

## 2025-01-01 NOTE — PLAN OF CARE
Problem: Chronic Conditions and Co-morbidities  Goal: Patient's chronic conditions and co-morbidity symptoms are monitored and maintained or improved  1/1/2025 1052 by Manasa Maki RN  Outcome: Progressing  Flowsheets (Taken 1/1/2025 0830)  Care Plan - Patient's Chronic Conditions and Co-Morbidity Symptoms are Monitored and Maintained or Improved:   Monitor and assess patient's chronic conditions and comorbid symptoms for stability, deterioration, or improvement   Collaborate with multidisciplinary team to address chronic and comorbid conditions and prevent exacerbation or deterioration   Update acute care plan with appropriate goals if chronic or comorbid symptoms are exacerbated and prevent overall improvement and discharge  1/1/2025 0112 by Emma Banks RN  Outcome: Progressing     Problem: Discharge Planning  Goal: Discharge to home or other facility with appropriate resources  1/1/2025 1052 by Manasa Maki RN  Outcome: Progressing  Flowsheets (Taken 1/1/2025 0830)  Discharge to home or other facility with appropriate resources:   Identify barriers to discharge with patient and caregiver   Arrange for needed discharge resources and transportation as appropriate   Identify discharge learning needs (meds, wound care, etc)  1/1/2025 0112 by Emma Banks RN  Outcome: Progressing     Problem: Pain  Goal: Verbalizes/displays adequate comfort level or baseline comfort level  1/1/2025 1052 by Manasa Maki RN  Outcome: Progressing  1/1/2025 0112 by Emma Banks RN  Outcome: Progressing     Problem: Safety - Adult  Goal: Free from fall injury  1/1/2025 1052 by Manasa Maki RN  Outcome: Progressing  1/1/2025 0112 by Emma Banks RN  Outcome: Progressing     Problem: ABCDS Injury Assessment  Goal: Absence of physical injury  1/1/2025 1052 by Manasa Maki RN  Outcome: Progressing  1/1/2025 0112 by Emma Banks RN  Outcome: Progressing

## 2025-01-01 NOTE — PLAN OF CARE
Signed         INTERVENTION:  HEMODYNAMIC STABILIZATION  MAINTAIN BP WNL WHILE ON HD.     INTERVENTION:  FLUID MANAGEMENT  WILL ATTEMPT 3000 ML TOTAL FLUID REMOVAL AS TOLERATED.     INTERVENTION:  METABOLIC/ELECTROLYTE MANAGEMENT  2.0 POTASSIUM 2.5 CALCIUM DIALYSATE USED WITH HD TODAY.     INTERVENTION:  HEMODIALYSIS ACCESS SITE MANAGEMENT  RIGHT SIDE CVC USING ASEPTIC TECHNIQUE.     GOAL:  SIGNS AND SYMPTOMS OF LISTED POTENTIAL PROBLEMS WILL BE ABSENT OR MANAGEABLE.     OUTCOME:  PROGRESSING.     HD PLANNED FOR 3 HOURS TODAY AS REQUESTED. Dr SANTOS AWARE.    Problem: Chronic Conditions and Co-morbidities  Goal: Patient's chronic conditions and co-morbidity symptoms are monitored and maintained or improved  1/1/2025 1244 by Annamaria Branham, RN  Outcome: Progressing

## 2025-01-01 NOTE — PLAN OF CARE
Problem: Chronic Conditions and Co-morbidities  Goal: Patient's chronic conditions and co-morbidity symptoms are monitored and maintained or improved  1/1/2025 0112 by Emma Banks RN  Outcome: Progressing  12/31/2024 1420 by Manasa Maki RN  Outcome: Progressing     Problem: Discharge Planning  Goal: Discharge to home or other facility with appropriate resources  1/1/2025 0112 by Emma Banks RN  Outcome: Progressing  12/31/2024 1420 by Manasa Maki RN  Outcome: Progressing     Problem: Pain  Goal: Verbalizes/displays adequate comfort level or baseline comfort level  1/1/2025 0112 by Emma Banks RN  Outcome: Progressing  12/31/2024 1420 by Manasa Maki RN  Outcome: Progressing  Flowsheets (Taken 12/31/2024 0918)  Verbalizes/displays adequate comfort level or baseline comfort level:   Encourage patient to monitor pain and request assistance   Assess pain using appropriate pain scale   Administer analgesics based on type and severity of pain and evaluate response   Implement non-pharmacological measures as appropriate and evaluate response     Problem: Safety - Adult  Goal: Free from fall injury  1/1/2025 0112 by Emma Banks RN  Outcome: Progressing  12/31/2024 1420 by Manasa Maki RN  Outcome: Progressing     Problem: ABCDS Injury Assessment  Goal: Absence of physical injury  1/1/2025 0112 by Emma Banks RN  Outcome: Progressing  12/31/2024 1420 by Manasa Maki RN  Outcome: Progressing

## 2025-01-02 VITALS
TEMPERATURE: 98.6 F | HEIGHT: 66 IN | WEIGHT: 169.53 LBS | DIASTOLIC BLOOD PRESSURE: 58 MMHG | RESPIRATION RATE: 18 BRPM | SYSTOLIC BLOOD PRESSURE: 126 MMHG | BODY MASS INDEX: 27.25 KG/M2 | HEART RATE: 75 BPM | OXYGEN SATURATION: 100 %

## 2025-01-02 LAB
BACTERIA SPEC CULT: NORMAL
BACTERIA SPEC CULT: NORMAL
GRAM STN SPEC: NORMAL
GRAM STN SPEC: NORMAL
O+P SPEC MICRO: NORMAL
O+P STL CONC: NORMAL
SERVICE CMNT-IMP: NORMAL
SERVICE CMNT-IMP: NORMAL
SPECIMEN SOURCE: NORMAL

## 2025-01-02 PROCEDURE — 97602 WOUND(S) CARE NON-SELECTIVE: CPT

## 2025-01-02 PROCEDURE — 2500000003 HC RX 250 WO HCPCS: Performed by: INTERNAL MEDICINE

## 2025-01-02 PROCEDURE — 6370000000 HC RX 637 (ALT 250 FOR IP): Performed by: INTERNAL MEDICINE

## 2025-01-02 RX ORDER — OXYCODONE AND ACETAMINOPHEN 5; 325 MG/1; MG/1
1 TABLET ORAL EVERY 6 HOURS PRN
Qty: 12 TABLET | Refills: 0 | Status: SHIPPED | OUTPATIENT
Start: 2025-01-02 | End: 2025-01-05

## 2025-01-02 RX ORDER — CIPROFLOXACIN 500 MG/1
250 TABLET, FILM COATED ORAL 2 TIMES DAILY
Qty: 5 TABLET | Refills: 0 | Status: SHIPPED | OUTPATIENT
Start: 2025-01-02 | End: 2025-01-07

## 2025-01-02 RX ADMIN — SEVELAMER CARBONATE 800 MG: 800 TABLET, FILM COATED ORAL at 08:03

## 2025-01-02 RX ADMIN — SODIUM CHLORIDE, PRESERVATIVE FREE 10 ML: 5 INJECTION INTRAVENOUS at 08:04

## 2025-01-02 RX ADMIN — SEVELAMER CARBONATE 800 MG: 800 TABLET, FILM COATED ORAL at 11:18

## 2025-01-02 RX ADMIN — HYDRALAZINE HYDROCHLORIDE 100 MG: 50 TABLET ORAL at 06:51

## 2025-01-02 RX ADMIN — CINACALCET HYDROCHLORIDE 90 MG: 30 TABLET, FILM COATED ORAL at 08:03

## 2025-01-02 RX ADMIN — CALCITRIOL CAPSULES 0.25 MCG 0.5 MCG: 0.25 CAPSULE ORAL at 08:03

## 2025-01-02 RX ADMIN — CYANOCOBALAMIN TAB 1000 MCG 1000 MCG: 1000 TAB at 08:03

## 2025-01-02 RX ADMIN — LABETALOL HYDROCHLORIDE 200 MG: 200 TABLET, FILM COATED ORAL at 08:09

## 2025-01-02 RX ADMIN — BISACODYL 5 MG: 5 TABLET, COATED ORAL at 08:03

## 2025-01-02 RX ADMIN — OXYCODONE HYDROCHLORIDE AND ACETAMINOPHEN 1 TABLET: 5; 325 TABLET ORAL at 11:18

## 2025-01-02 RX ADMIN — AMLODIPINE BESYLATE 10 MG: 5 TABLET ORAL at 08:09

## 2025-01-02 NOTE — PLAN OF CARE
Problem: Chronic Conditions and Co-morbidities  Goal: Patient's chronic conditions and co-morbidity symptoms are monitored and maintained or improved  1/2/2025 1148 by Yasmeen Bobo RN  Outcome: Progressing  1/1/2025 2247 by Emma Banks RN  Outcome: Progressing     Problem: Discharge Planning  Goal: Discharge to home or other facility with appropriate resources  1/2/2025 1148 by Yasmeen Bobo RN  Outcome: Progressing  1/1/2025 2247 by Emma Banks RN  Outcome: Progressing     Problem: Pain  Goal: Verbalizes/displays adequate comfort level or baseline comfort level  1/2/2025 1148 by Yasmeen Bobo RN  Outcome: Progressing  1/1/2025 2247 by Emma Banks RN  Outcome: Progressing     Problem: Safety - Adult  Goal: Free from fall injury  1/2/2025 1148 by Yasmeen Bobo RN  Outcome: Progressing  1/1/2025 2247 by Emma Banks RN  Outcome: Progressing     Problem: ABCDS Injury Assessment  Goal: Absence of physical injury  1/2/2025 1148 by Yasmeen Bobo RN  Outcome: Progressing  1/1/2025 2247 by Emma Banks RN  Outcome: Progressing

## 2025-01-02 NOTE — PLAN OF CARE
Problem: Chronic Conditions and Co-morbidities  Goal: Patient's chronic conditions and co-morbidity symptoms are monitored and maintained or improved  1/1/2025 2247 by Emma Banks RN  Outcome: Progressing  1/1/2025 1244 by Annamaria Branham RN  Outcome: Progressing  1/1/2025 1052 by Manasa Maki RN  Outcome: Progressing  Flowsheets (Taken 1/1/2025 0830)  Care Plan - Patient's Chronic Conditions and Co-Morbidity Symptoms are Monitored and Maintained or Improved:   Monitor and assess patient's chronic conditions and comorbid symptoms for stability, deterioration, or improvement   Collaborate with multidisciplinary team to address chronic and comorbid conditions and prevent exacerbation or deterioration   Update acute care plan with appropriate goals if chronic or comorbid symptoms are exacerbated and prevent overall improvement and discharge     Problem: Discharge Planning  Goal: Discharge to home or other facility with appropriate resources  1/1/2025 2247 by Emma Banks RN  Outcome: Progressing  1/1/2025 1052 by Manasa Maki RN  Outcome: Progressing  Flowsheets (Taken 1/1/2025 0830)  Discharge to home or other facility with appropriate resources:   Identify barriers to discharge with patient and caregiver   Arrange for needed discharge resources and transportation as appropriate   Identify discharge learning needs (meds, wound care, etc)     Problem: Pain  Goal: Verbalizes/displays adequate comfort level or baseline comfort level  1/1/2025 2247 by Emma Banks RN  Outcome: Progressing  1/1/2025 1052 by Manasa Maki RN  Outcome: Progressing     Problem: Safety - Adult  Goal: Free from fall injury  1/1/2025 2247 by Emma Banks RN  Outcome: Progressing  1/1/2025 1052 by Manasa Maki RN  Outcome: Progressing     Problem: ABCDS Injury Assessment  Goal: Absence of physical injury  1/1/2025 2247 by Emma Banks RN  Outcome: Progressing  1/1/2025 1052 by Manasa Maki RN  Outcome:

## 2025-01-02 NOTE — CONSULTS
Inpatient Wound Care Note:     Evan Huizar  MEDICAL RECORD NUMBER:  [id number]  AGE: 54 y.o.   GENDER: male  : 1970  TODAY'S DATE:  2025    [] Follow-up visit for   [x] New consult for scrotal wound  Seen in  with no assistance      PAST MEDICAL HISTORY    Past Medical History:   Diagnosis Date    Chronic kidney disease     Diabetes (HCC)     Heart failure (HCC)     Hemodialysis patient (HCC)     Hypertension     Psychiatric illness 2023    Sickle cell trait (HCC)         PAST SURGICAL HISTORY    Past Surgical History:   Procedure Laterality Date    IR NONTUNNELED VASCULAR CATHETER > 5 YEARS  2022    IR NONTUNNELED VASCULAR CATHETER 2022 [facility] IR    IR NONTUNNELED VASCULAR CATHETER > 5 YEARS  2022    IR NONTUNNELED VASCULAR CATHETER > 5 YEARS  2023    IR NONTUNNELED VASCULAR CATHETER 2023 [facility] RAD [facility]GIO IR    IR THRMB/INFUSION DIALYSIS CIRCUIT  6/3/2022    IR THRMB/INFUSION DIAYSIS CIRCUIT 6/3/2022 [facility] RAD ANGIO IR    IR THRMB/INFUSION DIALYSIS CIRCUIT  6/3/2022    IR TUNNELED CVC PLACE WO SQ PORT/PUMP > 5 YEARS  2022    IR TUNNELED CATHETER PLACEMENT GREATER THAN 5 YEARS 2022 [facility] RAD [facility]GIO IR    IR TUNNELED CVC PLACE WO SQ PORT/PUMP > 5 YEARS  2022    IR TUNNELED CVC PLACE WO SQ PORT/PUMP > 5 YEARS  2023    IR TUNNELED CATHETER PLACEMENT GREATER THAN 5 YEARS 2023 [facility] RAD ANGIO IR    IR TUNNELED CVC PLACE WO SQ PORT/PUMP > 5 YEARS  2023    IR TUNNELED CATHETER PLACEMENT GREATER THAN 5 YEARS 2023 Mora Chowdary MD [facility] RAD ANGIO IR    ORTHOPEDIC SURGERY      right BKA 2017    RECTAL SURGERY N/A 2024    RECTAL PERIRECTAL INCISION AND DRAINAGE performed by Pavan De La Torre MD at [facility] MAIN OR    XR MIDLINE EQUAL OR GREATER THAN 5 YEARS  2019    XR MIDLINE EQUAL OR GREATER THAN 5 YEARS 2019     SOCIAL HISTORY    Social History     Tobacco Use    Smoking status: Former    Smokeless tobacco: Never   Substance Use

## 2025-01-02 NOTE — CARE COORDINATION
Case management still following. Home health referrals have been place. Plan to discharge home when stable.

## 2025-01-02 NOTE — DISCHARGE SUMMARY
Discharge Summary    Patient: Evan Huizar               Sex: male          DOA: 12/26/2024         YOB: 1970      Age:  54 y.o.        LOS:  LOS: 6 days                Admit Date: 12/26/2024    Discharge Date: 1/2/2025    Admission Diagnoses: Cellulitis of scrotum [N49.2]  Acute pulmonary edema [J81.0]  ESRD on dialysis (HCC) [N18.6, Z99.2]  Pulmonary edema, acute [J81.0]    Discharge Diagnoses:    Hospital Problems             Last Modified POA    * (Principal) Pulmonary edema, acute 12/27/2024 Yes    Perineal abscess 12/31/2024 Yes        Discharge Condition: Fair    54 y.o.  male w/ PMH of ESRD, MSSA MV endocarditis, HTN, HFrEF, T2DM  BKA who presents after missing 1 week of HD. BKA   christel. *     Pulmonary edema, acute 2/2 missed HD sessions  Management with hemodialysis  Obtain oxygen level on RA during dialysis    had hypoxemeia arranged for home oxygen on dischargw  ESRD MWF  Non-adherence to HD  Patient received dialysis yesterday and today before.  Next dialysis session will be Monday  Sevelamer added  Cont Calcitriol/sensipar      Perieneal Abscess post I and D  IV Abx given transiton to po cipro  Cultures obtianed mixed lata   Wound care clinic per surgery   packing fell out day 1  Proctitis w/o bleeding  Penial discharge  Treated with abx and improved     HFrEF, a/c systolic decompensation with pulm edema as above  UF by HD.   Produces minimal urine.      HTN  Continue amlodipine, hydralazine, labetalol   Losartan added 10    Physical Exam:  BP (!) 126/58   Pulse 75   Temp 98.6 °F (37 °C) (Oral)   Resp 18   Ht 1.676 m (5' 6\")   Wt 76.9 kg (169 lb 8.5 oz)   SpO2 100%   BMI 27.36 kg/m²   General appearance: alert, cooperative, no distress, appears stated age  Head: Normocephalic, without obvious abnormality, atraumatic  Neck: supple, trachea midline  Lungs: clear to auscultation bilaterally  Heart: regular rate and rhythm, S1, S2 normal, no murmur, click, rub or

## 2025-01-02 NOTE — PROGRESS NOTES
Hospitalist Progress Note    Patient: Evan Huizar MRN: [id number]  CSN: [id number]    YOB: 1970  Age: 54 y.o.  Sex: male    DOA: 12/26/2024 LOS:  LOS: 1 day         Total duration of encounter: 2 days      Chief Complaint ; 54 y.o.  male w/ PMH of ESRD, MSSA MV endocarditis, HTN, HFrEF, T2DM who presents after missing 1 week of HD.     Subjective ; patient complains of a painful nodule between under his scrotum.  He denies shortness of breath or chest pain.  No other complaints expressed.    Review of systems:  Constitutional: denies fevers, chills, myalgias  Respiratory: denies SOB, cough  Cardiovascular: denies chest pain, palpitations  Gastrointestinal: denies nausea, vomiting, diarrhea    10 systems reviewed, all negative other than what is mentioned above.      Vital signs/Intake and Output:  Visit Vitals  BP (!) 107/44   Pulse 82   Temp 98.2 °F (36.8 °C) (Oral)   Resp 18   Ht 1.676 m (5' 6\")   Wt 76.9 kg (169 lb 8.5 oz)   SpO2 98%   BMI 27.36 kg/m²     Current Shift:  No intake/output data recorded.  Last three shifts:  12/26 1901 - 12/28 0700  In: 550   Out: 3500     Exam:    General: Well developed, alert, NAD, OX3  Head/Neck: NCAT, supple, No masses, No lymphadenopathy  CVS:Regular rate and rhythm, no M/R/G, S1/S2 heard, no thrill  Lungs:Clear to auscultation bilaterally, no wheezes, rhonchi, or rales  Abdomen: Soft, Nontender, No distention, Normal Bowel sounds, No hepatomegaly  Extremities: No C/C/E, pulses palpable 2+  Skin:normal texture and turgor, no rashes, no lesions  Neuro:grossly normal , follows commands  : Tender nodule in the perineum area  Psych:appropriate    Labs: Results:       Chemistry Recent Labs     12/26/24  1730 12/27/24  0630 12/28/24  0551   GLUCOSE 87 75 75    137 137   K 5.6* 3.4* 3.6    106 103   CO2 14* 20* 26   * 62* 33*   CREATININE 20.40* 12.10* 8.38*   CALCIUM 8.3* 8.2* 8.2*   BILITOT 0.7  --   --    AST 18  --   --    ALT 27  --   -- 
    Hospitalist Progress Note    Patient: Evan Huizar MRN: [id number]  CSN: [id number]    YOB: 1970  Age: 54 y.o.  Sex: male    DOA: 12/26/2024 LOS:  LOS: 2 days         Total duration of encounter: 3 days      Chief Complaint ; 54 y.o.  male w/ PMH of ESRD, MSSA MV endocarditis, HTN, HFrEF, T2DM  BKA who presents after missing 1 week of HD. BKA    Subjective ; patient complains of a painful nodule between under his scrotum.  He denies shortness of breath or chest pain.  No other complaints expressed. H    Review of systems:  Constitutional: denies fevers, chills, myalgias  Respiratory: denies SOB, cough  Cardiovascular: denies chest pain, palpitations  Gastrointestinal: denies nausea, vomiting, diarrhea    10 systems reviewed, all negative other than what is mentioned above.      Vital signs/Intake and Output:  Visit Vitals  BP (!) 108/58   Pulse 77   Temp 98.1 °F (36.7 °C) (Oral)   Resp 18   Ht 1.676 m (5' 6\")   Wt 76.9 kg (169 lb 8.5 oz)   SpO2 97%   BMI 27.36 kg/m²     Current Shift:  No intake/output data recorded.  Last three shifts:  No intake/output data recorded.    Exam:    General: Well developed, alert, NAD, OX3  Head/Neck: NCAT, supple, No masses, No lymphadenopathy  CVS:Regular rate and rhythm, no M/R/G, S1/S2 heard, no thrill  Lungs:Clear to auscultation bilaterally, no wheezes, rhonchi, or rales  Abdomen: Soft, Nontender, No distention, Normal Bowel sounds, No hepatomegaly  Extremities:BKA noted   Skin:normal texture and turgor, no rashes,positive endurated scrotal lesion with pustular drainage  Neuro:grossly normal , follows commands  : Tender nodule in the perineum area some pus noted endurated about 3 cm  Psych:appropriate    Labs: Results:       Chemistry Recent Labs     12/26/24  1730 12/27/24  0630 12/28/24  0551 12/29/24  0425   GLUCOSE 87 75 75 90    137 137 136   K 5.6* 3.4* 3.6 3.4*    106 103 103   CO2 14* 20* 26 24   * 62* 33* 46*   CREATININE 20.40* 
    Hospitalist Progress Note    Patient: Evan Huizar MRN: [id number]  CSN: [id number]    YOB: 1970  Age: 54 y.o.  Sex: male    DOA: 12/26/2024 LOS:  LOS: 3 days         Total duration of encounter: 4 days      Chief Complaint ; 54 y.o.  male w/ PMH of ESRD, MSSA MV endocarditis, HTN, HFrEF, T2DM  BKA who presents after missing 1 week of HD. BKA    Subjective ; patient complains of a painful nodule between under his scrotum.  He denies shortness of breath or chest pain.  No other complaints expressed. H    Review of systems:  Constitutional: denies fevers, chills, myalgias  Respiratory: denies SOB, cough  Cardiovascular: denies chest pain, palpitations  Gastrointestinal: denies nausea, vomiting, diarrhea    10 systems reviewed, all negative other than what is mentioned above.      Vital signs/Intake and Output:  Visit Vitals  BP (!) 140/61   Pulse 87   Temp 98.8 °F (37.1 °C)   Resp 16   Ht 1.676 m (5' 6\")   Wt 76.9 kg (169 lb 8.5 oz)   SpO2 91%   BMI 27.36 kg/m²     Current Shift:  No intake/output data recorded.  Last three shifts:  12/29 0701 - 12/30 1900  In: 1200 [P.O.:600]  Out: 239     Exam:    General: Well developed, alert, NAD, OX3  Head/Neck: NCAT, supple, No masses, No lymphadenopathy  CVS:Regular rate and rhythm, no M/R/G, S1/S2 heard, no thrill  Lungs:Clear to auscultation bilaterally, no wheezes, rhonchi, or rales  Abdomen: Soft, Nontender, No distention, Normal Bowel sounds, No hepatomegaly  Extremities:BKA noted   Skin:normal texture and turgor, no rashes,positive endurated scrotal lesion with pustular drainage  Neuro:grossly normal , follows commands  : Tender nodule in the perineum area some pus noted endurated about 3 cm  Psych:appropriate    Labs: Results:       Chemistry Recent Labs     12/28/24  0551 12/29/24  0425 12/30/24  0138   GLUCOSE 75 90 91    136 135*   K 3.6 3.4* 3.8    103 103   CO2 26 24 22   BUN 33* 46* 51*   CREATININE 8.38* 10.80* 12.30*   CALCIUM 
    Hospitalist Progress Note    Patient: Evan Huizar MRN: [id number]  CSN: [id number]    YOB: 1970  Age: 54 y.o.  Sex: male    DOA: 12/26/2024 LOS:  LOS: 5 days         Total duration of encounter: 6 days      Chief Complaint ; 54 y.o.  male w/ PMH of ESRD, MSSA MV endocarditis, HTN, HFrEF, T2DM  BKA who presents after missing 1 week of HD. BKA    Subjective ; post I and D   With falling of pacing  On iv abx    Sob with dialysis  Hypotension with dialysis  Hypoxemia with dialysis     Review of systems:  Constitutional: denies fevers, chills, myalgias  Respiratory+SOB, cough  Cardiovascular: denies chest pain, palpitations  Gastrointestinal: denies nausea, vomiting, diarrhea    10 systems reviewed, all negative other than what is mentioned above.      Vital signs/Intake and Output:  Visit Vitals  BP (!) 102/47   Pulse 78   Temp 97.7 °F (36.5 °C) (Oral)   Resp 18   Ht 1.676 m (5' 6\")   Wt 76.9 kg (169 lb 8.5 oz)   SpO2 98%   BMI 27.36 kg/m²     Current Shift:  01/01 0701 - 01/01 1900  In: 240 [P.O.:240]  Out: -   Last three shifts:  12/30 1901 - 01/01 0700  In: 1600 [P.O.:1600]  Out: -     Exam:    General: Well developed, alert, NAD, OX3  Head/Neck: NCAT, supple, No masses, No lymphadenopathy  CVS:Regular rate and rhythm, no M/R/G, S1/S2 heard, no thrill  Lungs:Clear to auscultation bilaterally, no wheezes, rhonchi, or rales  Abdomen: Soft, Nontender, No distention, Normal Bowel sounds, No hepatomegaly  Extremities:BKA noted   Skin:normal texture and turgor, no rashes,positive endurated scrotal lesion with pustular drainage post I and D  Neuro:grossly normal , follows commands  : Tender nodule in the perineum area some pus noted endurated about 3 cm  Psych:appropriate    Labs: Results:       Chemistry Recent Labs     12/30/24  0138 12/31/24  1105   GLUCOSE 91 102*   * 138   K 3.8 3.9    104   CO2 22 27   BUN 51* 31*   CREATININE 12.30* 9.02*   CALCIUM 7.3* 7.9*   LABGLOM 4* 6*      CBC 
    Hospitalist Progress Note    Patient: Evan Huizar MRN: [id number]  CSN: [id number]    YOB: 1970  Age: 54 y.o.  Sex: male    DOA: 12/26/2024 LOS:  LOS: 4 days         Total duration of encounter: 5 days      Chief Complaint ; 54 y.o.  male w/ PMH of ESRD, MSSA MV endocarditis, HTN, HFrEF, T2DM  BKA who presents after missing 1 week of HD. BKA    Subjective ; patient complains of a painful nodule between under his scrotum.  He denies shortness of breath or chest pain.  No other complaints expressed. H    Review of systems:  Constitutional: denies fevers, chills, myalgias  Respiratory: denies SOB, cough  Cardiovascular: denies chest pain, palpitations  Gastrointestinal: denies nausea, vomiting, diarrhea    10 systems reviewed, all negative other than what is mentioned above.      Vital signs/Intake and Output:  Visit Vitals  BP (!) 133/59   Pulse 74   Temp 98.4 °F (36.9 °C) (Oral)   Resp 16   Ht 1.676 m (5' 6\")   Wt 76.9 kg (169 lb 8.5 oz)   SpO2 96%   BMI 27.36 kg/m²     Current Shift:  12/31 0701 - 12/31 1900  In: 480 [P.O.:480]  Out: -   Last three shifts:  12/29 1901 - 12/31 0700  In: 1520 [P.O.:920]  Out: 239     Exam:    General: Well developed, alert, NAD, OX3  Head/Neck: NCAT, supple, No masses, No lymphadenopathy  CVS:Regular rate and rhythm, no M/R/G, S1/S2 heard, no thrill  Lungs:Clear to auscultation bilaterally, no wheezes, rhonchi, or rales  Abdomen: Soft, Nontender, No distention, Normal Bowel sounds, No hepatomegaly  Extremities:BKA noted   Skin:normal texture and turgor, no rashes,positive endurated scrotal lesion with pustular drainage  Neuro:grossly normal , follows commands  : Tender nodule in the perineum area some pus noted endurated about 3 cm  Psych:appropriate    Labs: Results:       Chemistry Recent Labs     12/29/24  0425 12/30/24  0138 12/31/24  1105   GLUCOSE 90 91 102*    135* 138   K 3.4* 3.8 3.9    103 104   CO2 24 22 27   BUN 46* 51* 31*   CREATININE 
1400  Patient packing to incision came out while trying to use BSC. Spoke with  about it. Order received to leave it out, put gauze on it.  
Assessment:     Evan Huizar is a 54 y.o. year old male ESRD on HD,  DM-2, anemia, uncontrolled hypertension , hyperphosphatemia,  non compliance to dialysis prescription, dietary/life style modification presented with multiple complaints like feeling fatigue,tired, shortness of breath . Reports scrotal pain and penile discharge . He missed dialysis for almost a week.   On arrival was uremic , uncontrolled Hypertension and mild pulmonary edema , which improved after 2 session of dialysis .      ESRD  Uncontrolled hypertension- improving   Uremia-improved   Medical non compliance   Hyperphosphatemia   Secondary hyperparathyroidism   Volume overload - improved      Plan:        Mental status at baseline/no sign of fluid overload    Sevelamer for hyperphosphatemia  Calcitriol/sensipar for secondary hyperparathyroidism.  Continue amlodipine, hydralazine, labetalol   for hypertension , if remained elevated add losartan 100 mg daily      CC: ESRD, proctitis  Interval History: no new issues, still has rectal pain, going to OR, no SOB, no edema        Subjective:   Dialysis was cut short yesterday to staffing issues.               Blood pressure (!) 140/54, pulse 78, temperature 97.7 °F (36.5 °C), temperature source Oral, resp. rate 16, height 1.676 m (5' 6\"), weight 76.9 kg (169 lb 8.5 oz), SpO2 93%.    Intake/Output Summary (Last 24 hours) at 12/31/2024 0758  Last data filed at 12/31/2024 0415  Gross per 24 hour   Intake 1520 ml   Output 239 ml   Net 1281 ml          Intake/Output Summary (Last 24 hours) at 12/31/2024 0758  Last data filed at 12/31/2024 0415  Gross per 24 hour   Intake 1520 ml   Output 239 ml   Net 1281 ml      No results for input(s): \"WBC\", \"PLATELET\" in the last 72 hours.    Invalid input(s): \"HEMOGLOBIN\"  Lab Results   Component Value Date/Time     12/30/2024 01:38 AM    K 3.8 12/30/2024 01:38 AM     12/30/2024 01:38 AM    CO2 22 12/30/2024 01:38 AM    BUN 51 12/30/2024 01:38 AM    GFRAA 
Assessment:     Evan Huizar is a 54 y.o. year old male ESRD on HD,  DM-2, anemia, uncontrolled hypertension , hyperphosphatemia,  non compliance to dialysis prescription, dietary/life style modification presented with multiple complaints like feeling fatigue,tired, shortness of breath . Reports scrotal pain and penile discharge . He missed dialysis for almost a week.   On arrival was uremic , uncontrolled Hypertension and mild pulmonary edema , which improved after 2 session of dialysis .      ESRD  Uncontrolled hypertension- improving   Uremia-improved   Medical non compliance   Hyperphosphatemia   Secondary hyperparathyroidism   Volume overload - improved      Plan:       seen on HD  Mental status at baseline/no sign of fluid overload    Sevelamer for hyperphosphatemia  Calcitriol/sensipar for secondary hyperparathyroidism.  Continue amlodipine, hydralazine, labetalol   for hypertension , if remained elevated add losartan 100 mg daily      CC: ESRD, proctitis  Interval History: no new issues,   Subjective:   Dialysis is being cut short as per pt      Subjective:   PT does not have any somatic complaints        Review of Systems  Negative for  SOB,  Nausea, vomiting        Blood pressure (!) 102/47, pulse 78, temperature 97.7 °F (36.5 °C), temperature source Oral, resp. rate 18, height 1.676 m (5' 6\"), weight 76.9 kg (169 lb 8.5 oz), SpO2 98%.    Intake/Output Summary (Last 24 hours) at 1/1/2025 1316  Last data filed at 1/1/2025 0830  Gross per 24 hour   Intake 1040 ml   Output --   Net 1040 ml        Appears chronically ill  Dey scally skin  BKA          Intake/Output Summary (Last 24 hours) at 1/1/2025 1316  Last data filed at 1/1/2025 0830  Gross per 24 hour   Intake 1040 ml   Output --   Net 1040 ml      Lab Results   Component Value Date/Time     12/31/2024 11:05 AM    K 3.9 12/31/2024 11:05 AM     12/31/2024 11:05 AM    CO2 27 12/31/2024 11:05 AM    BUN 31 12/31/2024 11:05 AM    GFRAA 12 
Assessment:     Evan Huizar is a 54 y.o. year old male ESRD on HD,  DM-2, anemia, uncontrolled hypertension , hyperphosphatemia,  non compliance to dialysis prescription, dietary/life style modification presented with multiple complaints like feeling fatigue,tired, shortness of breath . Reports scrotal pain and penile discharge . He missed dialysis for almost a week.   On arrival was uremic , uncontrolled Hypertension and mild pulmonary edema , which improved after 2 session of dialysis .      ESRD  Uncontrolled hypertension- improving   Uremia-improved   Medical non compliance   Hyperphosphatemia   Secondary hyperparathyroidism   Volume overload - improved      Plan:      HD in am if in pt  Sevelamer for hyperphosphatemia  Calcitriol/sensipar for secondary hyperparathyroidism.  Continue amlodipine, hydralazine, labetalol   for hypertension , if remained elevated add losartan 100 mg daily      CC: ESRD,   Interval History: no new issues,   Subjective:   No new complaints              Review of Systems  Negative for  SOB,  Nausea, vomiting      Subjective:   PT does not have any somatic complaints        Review of Systems  Negative for  SOB,  Nausea, vomiting        Blood pressure (!) 134/53, pulse 78, temperature 98.2 °F (36.8 °C), temperature source Oral, resp. rate 18, height 1.676 m (5' 6\"), weight 76.9 kg (169 lb 8.5 oz), SpO2 95%.    Intake/Output Summary (Last 24 hours) at 1/2/2025 0826  Last data filed at 1/2/2025 0800  Gross per 24 hour   Intake 1770 ml   Output 1538 ml   Net 232 ml        NAD, Conversant  No edema    Psychicatric: poor judgment and insights, alert and oreinted        Intake/Output Summary (Last 24 hours) at 1/2/2025 0826  Last data filed at 1/2/2025 0800  Gross per 24 hour   Intake 1770 ml   Output 1538 ml   Net 232 ml      Lab Results   Component Value Date/Time     12/31/2024 11:05 AM    K 3.9 12/31/2024 11:05 AM     12/31/2024 11:05 AM    CO2 27 12/31/2024 11:05 AM    BUN 
Bedside and Verbal shift change report given to SANIA Ca (oncoming nurse) by SANIA Arce (offgoing nurse). Report included the following information Nurse Handoff Report, Intake/Output, and MAR.    
Case management specialist met with patient to review and go over Important Message from Medicare letter in which patient acknowledged understanding and signed the letter. A copy of letter was left in room with patient.  
ESRD patient, non compliant who missed HD few session now with very high BUn and creatinine ,  Per ED physician needs CT with contrast    Plan Dialysis this evening for 3 hours  Order placed and informed on call HD nurse  Again dialysis tomorrow.   resume all home meds    Rakesh Vincent MD, CODI  [facility]  
Oxygen Walk Test  During Dialysis  Step 1)   Check patient at rest, without oxygen.    Record: Oxygen saturation 85% on room air, at rest.    If oxygen is less that 88%, conclude this test. If more than 88%, continue to step 2.      Step 3)  Check patient at rest again, after applying oxygen. Titrate to maintain >88%    Record: Oxygen saturation is 98 %, while at rest, with 2 L/min Oxygen.       Check delivery system:  Via:     [x] Nasal Cannula           [] Simple Face Mask            [] Non-Re-Breather Mask          [] Partial Re-Breather Mask            [] Venturi Mask     
Packing came out apparently yesterday.  OK to just cover with 4x4 q d and bath q d.  IV abx per primary team.  Can follow up with wound care clinic if needed.    
Patient appears to be stable.  He will undergo incision and drainage today.  I have discussed the risk benefits alternatives to him he understands and wishes to proceed.  
Patient in room 350 dialyzed for 3 hours.  Tolerated tx well with without complications.  Right Subclavian CVC functioning without complication accessing or BFR.    mL/min   mL/min  1538 ml UF removed with a Net UF removal of 0 ml.  Report given to Manasa Maki RN with all questions answered.        Pre Dialysis:  1115  Received report from Primary Nurse J Carlos Baez RN, pt in bed a & O x 4. No s/s of distress noted on RA.  Right Subclavian CVC assessed no s/s of infection noted. Dressing to same CDI, dated 12/27/24.    Intra Dialysis:  Time out/safety process performed per policy, Tx initiated at 1312. CVC flowing with ease. For hemodynamic stability UF goal set at 3000 ml as tolerated.     1330 Patient on room air, O2 Sat drooped to 89%. Patient c/o SOB and feeling sick.    1340 O2 Sat 85 %. Pt's breathing is labored. 2 L N/C placed as per Dr Karimi.     1345 UF stopped due to patient's c/o feeling sick. O2 Sat is now 99%.    1400 UF turned on. 1415 UF off. UF goal decreased to 2 L.1530 UF on. Pt stable.    Pt offered assistance with repositioning every 2 hours. Vascular access visible and line connections remained intact throughout entire duration of treatment. Vital signs checked every 15 mins.    Post Dialysis:  Tx completed at 1619, Tolerated fair ,1038 L removed. De-accessed per protocol.    Right Subclavian CVC locked with Heparin 1.6 ml in arterial port, and 1.6 ml in venous port. Red Curos caps applied to lumen ends securely. Catheter dressing applied aseptically. Patient remains in stable condition. Bed in lowest position, wheels locked. Call bell within reach.  
Patient in room 350 dialyzed for 3 hours.  Tolerated tx well with without complications.  Right Subclavian CVC functioning without complication accessing or BFR.    mL/min   mL/min  239 ml UF removed with a Net UF removal of 0 ml.  Report given to Stephany Elise RN with all questions answered.        Pre Dialysis:  1130 Received report from Primary Nurse J Carlos Baez RN, pt in bed a & O x 4. No s/s of distress noted on RA.  Right Subclavian CVC assessed no s/s of infection noted. Dressing to same CDI, dated 12/27/24.    Intra Dialysis:  Time out/safety process performed per policy, Tx initiated at 1220. CVC flowing with ease. For hemodynamic stability UF goal set at 1500 ml as tolerated.     1245 UF turned off due to hypotension. Patient c/o feeling sick.100 mL N/S administered.1249 Dr Sales made aware.    Pt offered assistance with repositioning every 2 hours. Vascular access visible and line connections remained intact throughout entire duration of treatment. Vital signs checked every 15 mins.    Post Dialysis:  Tx completed at 1528, Tolerated fair ,0 L removed. De-accessed per protocol.    Right Subclavian CVC locked with Heparin 1.6 ml in arterial port, and 1.6 ml in venous port. Red Curos caps applied to lumen ends securely. Catheter dressing applied aseptically. Patient remains in stable condition. Bed in lowest position, wheels locked. Call bell within reach.   
Patient in room ER 05 dialyzed for 3 hours.  Tolerated tx well with without complications.  Right Subclavian CVC functioning without complication accessing or BFR.    mL/min   mL/min  3500 ml UF removed with a Net UF removal of 3000 ml.  Report given to Carlos Bruno RN with all questions answered.        Pre Dialysis:    19:30 Pt. In Ultrasound.    20:55 Pt. In CT.    2110 Chest x- ray at bedside.    2118 Pt. To rest room via w/c.    21:25 Pt. Returned to room.    Received report from Primary Nurse, pt in bed a & O x 4. No s/s of distress noted on RA . No dressing noted to Catheter. Right Subclavian CVC assessed no s/s of infection noted.     Intra Dialysis:  Time out / safety process performed per policy, Tx initiated at 2207. CVC flowing with ease. For hemodynamic stability UF goal set at 3000 ml as tolerated. Pt offered assistance with repositioning every 2 hours. Vascular access visible and line connections remained intact throughout entire duration of treatment. Vital signs checked every 15 mins. Patient placed on 2 L N/C due to o@ desatting when asleep. Patient had 2 large soft brown bowel movements.    Post Dialysis:  Tx completed at 0108, Tolerated well ,3.0 L removed. De-accessed per protocol.    Right Subclavian CVC locked with Heparin 1.6 ml in arterial port, and 1.6 ml in venous port. Red Curos caps applied to lumen ends securely. Catheter dressing applied aseptically. Same is clean, dry and intact and dated 12/07/2024. Patient remains in stable condition. Bed in lowest position, wheels locked. Call bell within reach.   
Pharmacy Dosing Services - Levofloxacin:     Pharmacist Renal Dosing Note for Evan Huizar   Physician Dr. Karimi    Indication: SSTI x 7 days  Previous Regimen Levofloxacin 500 mg IV q24h   Serum Creatinine No results found for: \"YANET\", \"CREAPOC\"   Creatinine Clearance Estimated Creatinine Clearance: 7 mL/min (A) (based on SCr of 12.3 mg/dL (H)).   BUN Lab Results   Component Value Date/Time    BUN 51 12/30/2024 01:38 AM       New Regimen: Levofloxacin 250 mg IV q48h    Dose adjustment made using pharmacy-directed renal dosing protocols for levofloxacin with decreased renal function.    Please call pharmacy with questions.    Malik Mckeon RPH , PharmD  [phone]    
RECTAL PERIRECTAL INCISION AND DRAINAGE was postponed and is schedueled for 0730 on 12/31/2024 per Dr. De La Torre. Per anesthesia dialysis done too close to surgery time.  
RENAL CONSULT PROGRESS NOTE   2024    Patient:  Evan Huizar  :  1970  Gender:  male  MRN #:  [id number]    Reason for Consult: ESRD related care, pulmonary edema and uremia    Subjective:   Reports pain and some swelling in scrotal/rectal area again   No dyspnea, chest pain, nausea and diarrhea   No other concern     Objective:    BP (!) 108/58   Pulse 77   Temp 98.1 °F (36.7 °C) (Oral)   Resp 16   Ht 1.676 m (5' 6\")   Wt 76.9 kg (169 lb 8.5 oz)   SpO2 97%   BMI 27.36 kg/m²     Physical Exam:    Pt awake and alert     Lung: no crackles   Ext: no edema in LLE , BKA in right  and       Laboratory Data:    Lab Results   Component Value Date    BUN 46 (H) 2024    BUN 33 (H) 2024    BUN 62 (H) 2024     2024     2024     2024    CO2 24 2024    CO2 26 2024    CO2 20 (L) 2024    GLUCOSE 90 2024    GLUCOSE 75 2024    GLUCOSE 75 2024     Lab Results   Component Value Date    WBC 6.7 2024    HGB 8.5 (L) 2024    HCT 25.0 (L) 2024     Lab Results   Component Value Date    CALCIUM 7.7 (L) 2024     Lab Results   Component Value Date    HDL 62 (H) 2022     No results found for: \"TURBIDITY\"    Imaging Reveiwed:    CXR-    IMPRESSION:  Mild pulmonary edema    Assessment:    Evan Huizar is a 54 y.o. year old male ESRD on HD,  DM-2, anemia, uncontrolled hypertension , hyperphosphatemia,  non compliance to dialysis prescription, dietary/life style modification presented with multiple complaints like feeling fatigue,tired, shortness of breath . Reports scrotal pain and penile discharge . He missed dialysis for almost a week.   On arrival was uremic , uncontrolled Hypertension and mild pulmonary edema , which improved after 2 session of dialysis .     ESRD  Uncontrolled hypertension- improving   Uremia-improved   Medical non compliance   Hyperphosphatemia   Secondary hyperparathyroidism   Volume 
RENAL CONSULT PROGRESS NOTE   2024    Patient:  Evan Huizar  :  1970  Gender:  male  MRN #:  [id number]    Reason for Consult: ESRD related care, pulmonary edema and uremia    Subjective:   Reports pain and some swelling in scrotal/rectal area  No dyspnea, chest pain, nausea and diarrhea   Bp stable  Mental status improved to baseline     Objective:    BP (!) 118/59   Pulse 83   Temp 98.2 °F (36.8 °C) (Oral)   Resp 18   Ht 1.676 m (5' 6\")   Wt 76.9 kg (169 lb 8.5 oz)   SpO2 98%   BMI 27.36 kg/m²     Physical Exam:    Pt awake and alert     Lung: no crackles   Ext: no edema in LLE , BKA in right  and       Laboratory Data:    Lab Results   Component Value Date    BUN 33 (H) 2024    BUN 62 (H) 2024     (H) 2024     2024     2024     2024    CO2 26 2024    CO2 20 (L) 2024    CO2 14 (L) 2024    GLUCOSE 75 2024    GLUCOSE 75 2024    GLUCOSE 87 2024     Lab Results   Component Value Date    WBC 6.7 2024    HGB 8.5 (L) 2024    HCT 25.0 (L) 2024     Lab Results   Component Value Date    CALCIUM 8.2 (L) 2024     Lab Results   Component Value Date    HDL 62 (H) 2022     No results found for: \"TURBIDITY\"    Imaging Reveiwed:    CXR-    IMPRESSION:  Mild pulmonary edema    Assessment:    Evan Huizar is a 54 y.o. year old male ESRD on HD,  DM-2, anemia, uncontrolled hypertension , hyperphosphatemia,  non compliance to dialysis prescription, dietary/life style modification presented with multiple complaints like feeling fatigue,tired, shortness of breath . Reports scrotal pain and penile discharge . He missed dialysis for almost a week.   On arrival was uremic , uncontrolled Hypertension and mild pulmonary edema , which improved after 2 session of dialysis .     ESRD  Uncontrolled hypertension  Uremia  Medical non compliance   Hyperphosphatemia   Secondary hyperparathyroidism 
Renal Dosing Adjustment     Levofloxacin was automatically dose-adjusted per [facility] P&T approved Protocols, with respect to renal function.    Pt Weight:   Wt Readings from Last 1 Encounters:   12/27/24 76.9 kg (169 lb 8.5 oz)       Previous Regimen                                    Levofloxacin 500 mg IV q24h x 7 days   Creatinine Clearance Estimated Creatinine Clearance: 7 mL/min (A) (based on SCr of 12.3 mg/dL (H)).   BUN Lab Results   Component Value Date/Time    BUN 51 12/30/2024 01:38 AM         Assessment/Plan  Dose adjusted to Levofloxacin 500 mg IV q48h x 5 days remaining  (3 doses remaining)   Pharmacy to continue to monitor patient daily.   Will make dosage adjustments based upon changing renal function.    Em Kaiser, PharmD  Contact: [phone]  
Spiritual Health History and Assessment/Progress Note  [facility]    Spiritual/Emotional Needs,  ,  ,      Name: Evan Huizar MRN: [id number]    Age: 54 y.o.     Sex: male   Language: English   Confucianism: Pentecostal   Pulmonary edema, acute     Date: 12/27/2024            Total Time Calculated: 5 min              Spiritual Assessment began in [address] CARDIAC/MEDICAL        Referral/Consult From: Rounding   Encounter Overview/Reason: Spiritual/Emotional Needs  Service Provided For: Patient    Caridad, Belief, Meaning:   Patient is connected with a caridad tradition or spiritual practice  Family/Friends No family/friends present      Importance and Influence:  Patient has spiritual/personal beliefs that influence decisions regarding their health  Family/Friends No family/friends present    Community:  Patient expresses feelings of isolation: feeling no one understands  Family/Friends No family/friends present    Assessment and Plan of Care:     Patient Interventions include: Facilitated expression of thoughts and feelings  Family/Friends Interventions include: No family/friends present    Patient Plan of Care: Spiritual Care available upon further referral  Family/Friends Plan of Care: No family/friends present    Electronically signed by Chaplain Edgar on 12/27/2024 at 4:22 PM   
TRANSFER - IN REPORT:    Verbal report received from OR nurse on Evan Huizar  being received from OR for routine progression of patient care      Report consisted of patient's Situation, Background, Assessment and   Recommendations(SBAR).     Information from the following report(s) Nurse Handoff Report, MAR, and Cardiac Rhythm SR  was reviewed with the receiving nurse.    Opportunity for questions and clarification was provided.      Assessment completed upon patient's arrival to unit and care assumed.     
TRANSFER - OUT REPORT:    Verbal report given to Pre op holding on Evan Huizar  being transferred to Pre op for ordered procedure       Report consisted of patient's Situation, Background, Assessment and   Recommendations(SBAR).     Information from the following report(s) Nurse Handoff Report, Adult Overview, Intake/Output, and MAR was reviewed with the receiving nurse.           Lines:   Peripheral IV 12/26/24 Right Antecubital (Active)   Site Assessment Clean, dry & intact 12/30/24 0413   Line Status Flushed 12/30/24 0413   Line Care Connections checked and tightened 12/30/24 0413   Phlebitis Assessment No symptoms 12/30/24 0413   Infiltration Assessment 0 12/30/24 0413   Alcohol Cap Used Yes 12/30/24 0413   Dressing Status Clean, dry & intact 12/30/24 0413   Dressing Type Transparent 12/30/24 0413       Tunneled Hemodialysis Catheter Right Subclavian (Active)   $Tunneled Hemodialysis Catheter $Yes 12/27/24 0800   Continued need for line? Yes 12/30/24 1220   Site Assessment Clean, dry & intact 12/30/24 1220   CVC Lumen Status Brisk blood return;Flushed;Other (Comment) 12/30/24 1220   Blue Lumen Status Brisk blood return;Flushed 12/30/24 1220   Red Lumen Status Brisk blood return;Flushed 12/30/24 1220   Alcohol Cap Used No 12/30/24 1220   Line Care Chlorhexidine wipes;Ports disinfected;Connections checked and tightened 12/30/24 1220   Dressing Type Transparent;Antimicrobial 12/30/24 1220   Date of Last Dressing Change 12/27/24 12/30/24 1220   Dressing Status Clean, dry & intact 12/30/24 1220   Dressing Intervention New 12/27/24 0108   Dressing Change Due 01/03/25 12/30/24 1220        Opportunity for questions and clarification was provided.      Patient transported with:  Tech        
Telephone order received from primary MD and Surgeon to make NPO now for surgical intervention.  Orders implemented, patient updated, verbalized understanding.  
The patient had dialysis today and anesthesia would rather not do a procedure just after the patient had dialysis.  Because of the time he will be scheduled for tomorrow morning for incision and drainage.  
Unfortunately patient ate breakfast and anesthesia will not do the case for 8 hours.  Therefore his case will be delayed.  
ondansetron (ZOFRAN) injection 4 mg  4 mg IntraVENous Q6H PRN Ham Prince DO   4 mg at 12/27/24 1149    acetaminophen (TYLENOL) tablet 650 mg  650 mg Oral Q6H PRN Ham Prince DO   650 mg at 12/30/24 0937    Or    acetaminophen (TYLENOL) suppository 650 mg  650 mg Rectal Q6H PRN Ham Prince DO        bisacodyl (DULCOLAX) EC tablet 5 mg  5 mg Oral Daily Ham Prince DO   5 mg at 12/30/24 0945    senna (SENOKOT) tablet 8.6 mg  1 tablet Oral Daily PRN Ham Prince DO        polyethylene glycol (GLYCOLAX) packet 17 g  17 g Oral Daily PRN Ham Prince DO        [Held by provider] heparin (porcine) injection 5,000 Units  5,000 Units SubCUTAneous 3 times per day Ham Prince DO   5,000 Units at 12/30/24 0540    doxycycline monohydrate (MONODOX) capsule 100 mg  100 mg Oral 2 times per day Ham Prince DO   100 mg at 12/30/24 0937

## 2025-01-07 ENCOUNTER — APPOINTMENT (OUTPATIENT)
Facility: HOSPITAL | Age: 55
DRG: 640 | End: 2025-01-07
Payer: MEDICARE

## 2025-01-07 ENCOUNTER — APPOINTMENT (OUTPATIENT)
Facility: HOSPITAL | Age: 55
DRG: 640 | End: 2025-01-07
Attending: INTERNAL MEDICINE
Payer: MEDICARE

## 2025-01-07 ENCOUNTER — HOSPITAL ENCOUNTER (INPATIENT)
Facility: HOSPITAL | Age: 55
LOS: 2 days | Discharge: HOME HEALTH | DRG: 640 | End: 2025-01-09
Attending: EMERGENCY MEDICINE | Admitting: INTERNAL MEDICINE
Payer: MEDICARE

## 2025-01-07 DIAGNOSIS — R06.02 SHORTNESS OF BREATH: ICD-10-CM

## 2025-01-07 DIAGNOSIS — I50.31 ACUTE DIASTOLIC CONGESTIVE HEART FAILURE (HCC): ICD-10-CM

## 2025-01-07 DIAGNOSIS — I16.9 HYPERTENSIVE CRISIS: ICD-10-CM

## 2025-01-07 DIAGNOSIS — E87.5 HYPERKALEMIA: Primary | ICD-10-CM

## 2025-01-07 DIAGNOSIS — J81.0 ACUTE PULMONARY EDEMA: ICD-10-CM

## 2025-01-07 DIAGNOSIS — Z91.158 DIALYSIS PATIENT, NONCOMPLIANT (HCC): ICD-10-CM

## 2025-01-07 PROBLEM — Z99.2 ESRD (END STAGE RENAL DISEASE) ON DIALYSIS (HCC): Chronic | Status: ACTIVE | Noted: 2020-01-29

## 2025-01-07 PROBLEM — N18.6 ESRD (END STAGE RENAL DISEASE) ON DIALYSIS (HCC): Chronic | Status: ACTIVE | Noted: 2020-01-29

## 2025-01-07 PROBLEM — Z89.511 HX OF BKA, RIGHT (HCC): Chronic | Status: ACTIVE | Noted: 2020-01-29
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
## 2025-01-07 PROBLEM — I10 HYPERTENSION: Chronic | Status: ACTIVE | Noted: 2022-06-01

## 2025-01-07 LAB
ALBUMIN SERPL-MCNC: 3.2 G/DL (ref 3.4–5)
ALBUMIN SERPL-MCNC: 3.4 G/DL (ref 3.4–5)
ALBUMIN/GLOB SERPL: 0.8 (ref 0.8–1.7)
ALP SERPL-CCNC: 123 U/L (ref 45–117)
ALT SERPL-CCNC: 58 U/L (ref 16–61)
AMMONIA PLAS-SCNC: 18 UMOL/L (ref 11–32)
ANION GAP SERPL CALC-SCNC: 13 MMOL/L (ref 3–18)
ANION GAP SERPL CALC-SCNC: 14 MMOL/L (ref 3–18)
AST SERPL-CCNC: 39 U/L (ref 10–38)
BASOPHILS # BLD: 0.03 K/UL (ref 0–0.1)
BASOPHILS NFR BLD: 0.2 % (ref 0–2)
BILIRUB SERPL-MCNC: 0.5 MG/DL (ref 0.2–1)
BUN SERPL-MCNC: 111 MG/DL (ref 7–18)
BUN SERPL-MCNC: 99 MG/DL (ref 7–18)
BUN/CREAT SERPL: 6 (ref 12–20)
BUN/CREAT SERPL: 7 (ref 12–20)
CA-I SERPL-SCNC: 0.76 MMOL/L (ref 1.12–1.32)
CA-I SERPL-SCNC: 0.87 MMOL/L (ref 1.12–1.32)
CA-I SERPL-SCNC: 0.89 MMOL/L (ref 1.12–1.32)
CALCIUM SERPL-MCNC: 6.1 MG/DL (ref 8.5–10.1)
CALCIUM SERPL-MCNC: 6.5 MG/DL (ref 8.5–10.1)
CHLORIDE SERPL-SCNC: 105 MMOL/L (ref 100–111)
CHLORIDE SERPL-SCNC: 106 MMOL/L (ref 100–111)
CK SERPL-CCNC: 322 U/L (ref 39–308)
CK SERPL-CCNC: 377 U/L (ref 39–308)
CO2 SERPL-SCNC: 17 MMOL/L (ref 21–32)
CO2 SERPL-SCNC: 18 MMOL/L (ref 21–32)
CREAT SERPL-MCNC: 15.6 MG/DL (ref 0.6–1.3)
CREAT SERPL-MCNC: 15.6 MG/DL (ref 0.6–1.3)
DIFFERENTIAL METHOD BLD: ABNORMAL
ECHO AO ROOT DIAM: 2.8 CM
ECHO AO ROOT INDEX: 1.47 CM/M2
ECHO AV AREA PEAK VELOCITY: 2.1 CM2
ECHO AV AREA VTI: 2.1 CM2
ECHO AV AREA/BSA PEAK VELOCITY: 1.1 CM2/M2
ECHO AV AREA/BSA VTI: 1.1 CM2/M2
ECHO AV MEAN GRADIENT: 9 MMHG
ECHO AV MEAN VELOCITY: 1.4 M/S
ECHO AV PEAK GRADIENT: 17 MMHG
ECHO AV PEAK VELOCITY: 2.1 M/S
ECHO AV VELOCITY RATIO: 0.57
ECHO AV VTI: 42.9 CM
ECHO BSA: 1.94 M2
ECHO EST RA PRESSURE: 3 MMHG
ECHO LA DIAMETER INDEX: 2.62 CM/M2
ECHO LA DIAMETER: 5 CM
ECHO LA TO AORTIC ROOT RATIO: 1.79
ECHO LA VOL A-L A2C: 88 ML (ref 18–58)
ECHO LA VOL A-L A4C: 89 ML (ref 18–58)
ECHO LA VOL BP: 87 ML (ref 18–58)
ECHO LA VOL MOD A2C: 85 ML (ref 18–58)
ECHO LA VOL MOD A4C: 82 ML (ref 18–58)
ECHO LA VOL/BSA BIPLANE: 46 ML/M2 (ref 16–34)
ECHO LA VOLUME AREA LENGTH: 93 ML
ECHO LA VOLUME INDEX A-L A2C: 46 ML/M2 (ref 16–34)
ECHO LA VOLUME INDEX A-L A4C: 47 ML/M2 (ref 16–34)
ECHO LA VOLUME INDEX AREA LENGTH: 49 ML/M2 (ref 16–34)
ECHO LA VOLUME INDEX MOD A2C: 45 ML/M2 (ref 16–34)
ECHO LA VOLUME INDEX MOD A4C: 43 ML/M2 (ref 16–34)
ECHO LV E' LATERAL VELOCITY: 6.79 CM/S
ECHO LV E' SEPTAL VELOCITY: 5.54 CM/S
ECHO LV EDV A2C: 114 ML
ECHO LV EDV A4C: 123 ML
ECHO LV EDV BP: 127 ML (ref 67–155)
ECHO LV EDV INDEX A4C: 64 ML/M2
ECHO LV EDV INDEX BP: 66 ML/M2
ECHO LV EDV NDEX A2C: 60 ML/M2
ECHO LV EJECTION FRACTION A2C: 53 %
ECHO LV EJECTION FRACTION A4C: 52 %
ECHO LV EJECTION FRACTION BIPLANE: 53 % (ref 55–100)
ECHO LV ESV A2C: 53 ML
ECHO LV ESV A4C: 59 ML
ECHO LV ESV BP: 59 ML (ref 22–58)
ECHO LV ESV INDEX A2C: 28 ML/M2
ECHO LV ESV INDEX A4C: 31 ML/M2
ECHO LV ESV INDEX BP: 31 ML/M2
ECHO LV FRACTIONAL SHORTENING: 26 % (ref 28–44)
ECHO LV INTERNAL DIMENSION DIASTOLE INDEX: 2.83 CM/M2
ECHO LV INTERNAL DIMENSION DIASTOLIC: 5.4 CM (ref 4.2–5.9)
ECHO LV INTERNAL DIMENSION SYSTOLIC INDEX: 2.09 CM/M2
ECHO LV INTERNAL DIMENSION SYSTOLIC: 4 CM
ECHO LV IVSD: 1.3 CM (ref 0.6–1)
ECHO LV MASS 2D: 279.8 G (ref 88–224)
ECHO LV MASS INDEX 2D: 146.5 G/M2 (ref 49–115)
ECHO LV POSTERIOR WALL DIASTOLIC: 1.2 CM (ref 0.6–1)
ECHO LV RELATIVE WALL THICKNESS RATIO: 0.44
ECHO LVOT AREA: 3.5 CM2
ECHO LVOT AV VTI INDEX: 0.59
ECHO LVOT DIAM: 2.1 CM
ECHO LVOT MEAN GRADIENT: 3 MMHG
ECHO LVOT PEAK GRADIENT: 6 MMHG
ECHO LVOT PEAK VELOCITY: 1.2 M/S
ECHO LVOT STROKE VOLUME INDEX: 45.7 ML/M2
ECHO LVOT SV: 87.2 ML
ECHO LVOT VTI: 25.2 CM
ECHO MV A VELOCITY: 1.33 M/S
ECHO MV E DECELERATION TIME (DT): 117 MS
ECHO MV E VELOCITY: 1.09 M/S
ECHO MV E/A RATIO: 0.82
ECHO MV E/E' LATERAL: 16.05
ECHO MV E/E' RATIO (AVERAGED): 17.86
ECHO MV E/E' SEPTAL: 19.68
ECHO RA END SYSTOLIC VOLUME APICAL 4 CHAMBER INDEX BSA: 32 ML/M2
ECHO RA VOLUME: 61 ML
ECHO RIGHT VENTRICULAR SYSTOLIC PRESSURE (RVSP): 33 MMHG
ECHO RV FREE WALL PEAK S': 11.8 CM/S
ECHO RV INTERNAL DIMENSION: 4.2 CM
ECHO RV TAPSE: 2.1 CM (ref 1.7–?)
ECHO TV REGURGITANT MAX VELOCITY: 2.72 M/S
ECHO TV REGURGITANT PEAK GRADIENT: 30 MMHG
EOSINOPHIL # BLD: 0.01 K/UL (ref 0–0.4)
EOSINOPHIL NFR BLD: 0.1 % (ref 0–5)
ERYTHROCYTE [DISTWIDTH] IN BLOOD BY AUTOMATED COUNT: 17.7 % (ref 11.6–14.5)
ETHANOL SERPL-MCNC: <3 MG/DL (ref 0–3)
FERRITIN SERPL-MCNC: 1243 NG/ML (ref 8–388)
GLOBULIN SER CALC-MCNC: 4.5 G/DL (ref 2–4)
GLUCOSE BLD STRIP.AUTO-MCNC: 106 MG/DL (ref 70–110)
GLUCOSE BLD STRIP.AUTO-MCNC: 147 MG/DL (ref 70–110)
GLUCOSE BLD STRIP.AUTO-MCNC: 173 MG/DL (ref 70–110)
GLUCOSE BLD STRIP.AUTO-MCNC: 181 MG/DL (ref 70–110)
GLUCOSE BLD STRIP.AUTO-MCNC: 61 MG/DL (ref 70–110)
GLUCOSE BLD STRIP.AUTO-MCNC: 77 MG/DL (ref 70–110)
GLUCOSE BLD STRIP.AUTO-MCNC: 92 MG/DL (ref 70–110)
GLUCOSE BLD STRIP.AUTO-MCNC: 94 MG/DL (ref 70–110)
GLUCOSE SERPL-MCNC: 45 MG/DL (ref 74–99)
GLUCOSE SERPL-MCNC: 62 MG/DL (ref 74–99)
HCT VFR BLD AUTO: 25.2 % (ref 36–48)
HGB BLD-MCNC: 8.5 G/DL (ref 13–16)
IMM GRANULOCYTES # BLD AUTO: 0.09 K/UL (ref 0–0.04)
IMM GRANULOCYTES NFR BLD AUTO: 0.6 % (ref 0–0.5)
IRON SATN MFR SERPL: 37 % (ref 20–50)
IRON SERPL-MCNC: 50 UG/DL (ref 50–175)
LYMPHOCYTES # BLD: 0.38 K/UL (ref 0.9–3.6)
LYMPHOCYTES NFR BLD: 2.7 % (ref 21–52)
MAGNESIUM SERPL-MCNC: 2.3 MG/DL (ref 1.6–2.6)
MAGNESIUM SERPL-MCNC: 2.7 MG/DL (ref 1.6–2.6)
MCH RBC QN AUTO: 27.3 PG (ref 24–34)
MCHC RBC AUTO-ENTMCNC: 33.7 G/DL (ref 31–37)
MCV RBC AUTO: 81 FL (ref 78–100)
MONOCYTES # BLD: 0.78 K/UL (ref 0.05–1.2)
MONOCYTES NFR BLD: 5.6 % (ref 3–10)
NEUTS SEG # BLD: 12.62 K/UL (ref 1.8–8)
NEUTS SEG NFR BLD: 90.8 % (ref 40–73)
NRBC # BLD: 0 K/UL (ref 0–0.01)
NRBC BLD-RTO: 0 PER 100 WBC
PHOSPHATE SERPL-MCNC: 10 MG/DL (ref 2.5–4.9)
PHOSPHATE SERPL-MCNC: 3.4 MG/DL (ref 2.5–4.9)
PLATELET # BLD AUTO: 125 K/UL (ref 135–420)
POTASSIUM SERPL-SCNC: 3.8 MMOL/L (ref 3.5–5.5)
POTASSIUM SERPL-SCNC: 4.6 MMOL/L (ref 3.5–5.5)
POTASSIUM SERPL-SCNC: 6.7 MMOL/L (ref 3.5–5.5)
POTASSIUM SERPL-SCNC: 7.4 MMOL/L (ref 3.5–5.5)
POTASSIUM SERPL-SCNC: 8.3 MMOL/L (ref 3.5–5.5)
PROT SERPL-MCNC: 7.9 G/DL (ref 6.4–8.2)
RBC # BLD AUTO: 3.11 M/UL (ref 4.35–5.65)
RETICS/RBC NFR AUTO: 1.2 % (ref 0.5–2.5)
SODIUM SERPL-SCNC: 136 MMOL/L (ref 136–145)
SODIUM SERPL-SCNC: 137 MMOL/L (ref 136–145)
TIBC SERPL-MCNC: 134 UG/DL (ref 250–450)
TROPONIN I SERPL HS-MCNC: 22 NG/L (ref 0–78)
WBC # BLD AUTO: 13.9 K/UL (ref 4.6–13.2)

## 2025-01-07 PROCEDURE — 82330 ASSAY OF CALCIUM: CPT

## 2025-01-07 PROCEDURE — 96374 THER/PROPH/DIAG INJ IV PUSH: CPT

## 2025-01-07 PROCEDURE — 6360000002 HC RX W HCPCS: Performed by: INTERNAL MEDICINE

## 2025-01-07 PROCEDURE — 82962 GLUCOSE BLOOD TEST: CPT

## 2025-01-07 PROCEDURE — 6370000000 HC RX 637 (ALT 250 FOR IP): Performed by: INTERNAL MEDICINE

## 2025-01-07 PROCEDURE — 84484 ASSAY OF TROPONIN QUANT: CPT

## 2025-01-07 PROCEDURE — 82140 ASSAY OF AMMONIA: CPT

## 2025-01-07 PROCEDURE — 80069 RENAL FUNCTION PANEL: CPT

## 2025-01-07 PROCEDURE — 93005 ELECTROCARDIOGRAM TRACING: CPT | Performed by: EMERGENCY MEDICINE

## 2025-01-07 PROCEDURE — 2500000003 HC RX 250 WO HCPCS: Performed by: INTERNAL MEDICINE

## 2025-01-07 PROCEDURE — 83550 IRON BINDING TEST: CPT

## 2025-01-07 PROCEDURE — 82550 ASSAY OF CK (CPK): CPT

## 2025-01-07 PROCEDURE — 90935 HEMODIALYSIS ONE EVALUATION: CPT

## 2025-01-07 PROCEDURE — 84100 ASSAY OF PHOSPHORUS: CPT

## 2025-01-07 PROCEDURE — 82077 ASSAY SPEC XCP UR&BREATH IA: CPT

## 2025-01-07 PROCEDURE — 93325 DOPPLER ECHO COLOR FLOW MAPG: CPT

## 2025-01-07 PROCEDURE — 99285 EMERGENCY DEPT VISIT HI MDM: CPT

## 2025-01-07 PROCEDURE — 85045 AUTOMATED RETICULOCYTE COUNT: CPT

## 2025-01-07 PROCEDURE — 6370000000 HC RX 637 (ALT 250 FOR IP): Performed by: EMERGENCY MEDICINE

## 2025-01-07 PROCEDURE — 71045 X-RAY EXAM CHEST 1 VIEW: CPT

## 2025-01-07 PROCEDURE — 99223 1ST HOSP IP/OBS HIGH 75: CPT

## 2025-01-07 PROCEDURE — 85025 COMPLETE CBC W/AUTO DIFF WBC: CPT

## 2025-01-07 PROCEDURE — 1100000003 HC PRIVATE W/ TELEMETRY

## 2025-01-07 PROCEDURE — 80053 COMPREHEN METABOLIC PANEL: CPT

## 2025-01-07 PROCEDURE — 82728 ASSAY OF FERRITIN: CPT

## 2025-01-07 PROCEDURE — 83735 ASSAY OF MAGNESIUM: CPT

## 2025-01-07 PROCEDURE — 70450 CT HEAD/BRAIN W/O DYE: CPT

## 2025-01-07 PROCEDURE — 5A1D70Z PERFORMANCE OF URINARY FILTRATION, INTERMITTENT, LESS THAN 6 HOURS PER DAY: ICD-10-PCS | Performed by: INTERNAL MEDICINE

## 2025-01-07 PROCEDURE — 2500000003 HC RX 250 WO HCPCS: Performed by: EMERGENCY MEDICINE

## 2025-01-07 PROCEDURE — 83540 ASSAY OF IRON: CPT

## 2025-01-07 PROCEDURE — 96375 TX/PRO/DX INJ NEW DRUG ADDON: CPT

## 2025-01-07 PROCEDURE — 84132 ASSAY OF SERUM POTASSIUM: CPT

## 2025-01-07 PROCEDURE — 6360000002 HC RX W HCPCS: Performed by: EMERGENCY MEDICINE

## 2025-01-07 RX ORDER — HYDRALAZINE HYDROCHLORIDE 50 MG/1
100 TABLET, FILM COATED ORAL EVERY 8 HOURS SCHEDULED
Status: DISCONTINUED | OUTPATIENT
Start: 2025-01-07 | End: 2025-01-09 | Stop reason: HOSPADM

## 2025-01-07 RX ORDER — ACETAMINOPHEN 325 MG/1
650 TABLET ORAL EVERY 6 HOURS PRN
Status: DISCONTINUED | OUTPATIENT
Start: 2025-01-07 | End: 2025-01-09 | Stop reason: HOSPADM

## 2025-01-07 RX ORDER — SODIUM CHLORIDE 0.9 % (FLUSH) 0.9 %
5-40 SYRINGE (ML) INJECTION PRN
Status: DISCONTINUED | OUTPATIENT
Start: 2025-01-07 | End: 2025-01-09 | Stop reason: HOSPADM

## 2025-01-07 RX ORDER — SODIUM CHLORIDE 9 MG/ML
INJECTION, SOLUTION INTRAVENOUS PRN
Status: DISCONTINUED | OUTPATIENT
Start: 2025-01-07 | End: 2025-01-09 | Stop reason: HOSPADM

## 2025-01-07 RX ORDER — SODIUM PHOSPHATE, DIBASIC AND SODIUM PHOSPHATE, MONOBASIC 7; 19 G/230ML; G/230ML
1 ENEMA RECTAL DAILY PRN
Status: DISCONTINUED | OUTPATIENT
Start: 2025-01-07 | End: 2025-01-09 | Stop reason: HOSPADM

## 2025-01-07 RX ORDER — POLYETHYLENE GLYCOL 3350 17 G/17G
17 POWDER, FOR SOLUTION ORAL DAILY PRN
Status: DISCONTINUED | OUTPATIENT
Start: 2025-01-07 | End: 2025-01-09 | Stop reason: HOSPADM

## 2025-01-07 RX ORDER — CALCITRIOL 0.25 UG/1
0.5 CAPSULE, LIQUID FILLED ORAL DAILY
Status: DISCONTINUED | OUTPATIENT
Start: 2025-01-07 | End: 2025-01-09 | Stop reason: HOSPADM

## 2025-01-07 RX ORDER — CALCIUM GLUCONATE 94 MG/ML
1000 INJECTION, SOLUTION INTRAVENOUS
Status: COMPLETED | OUTPATIENT
Start: 2025-01-07 | End: 2025-01-07

## 2025-01-07 RX ORDER — GLUCAGON 1 MG/ML
1 KIT INJECTION PRN
Status: DISCONTINUED | OUTPATIENT
Start: 2025-01-07 | End: 2025-01-07

## 2025-01-07 RX ORDER — CALCIUM GLUCONATE 20 MG/ML
1000 INJECTION, SOLUTION INTRAVENOUS ONCE
Status: COMPLETED | OUTPATIENT
Start: 2025-01-07 | End: 2025-01-08

## 2025-01-07 RX ORDER — CALCIUM GLUCONATE 20 MG/ML
2000 INJECTION, SOLUTION INTRAVENOUS ONCE
Status: COMPLETED | OUTPATIENT
Start: 2025-01-07 | End: 2025-01-08

## 2025-01-07 RX ORDER — ACETAMINOPHEN 650 MG/1
650 SUPPOSITORY RECTAL EVERY 6 HOURS PRN
Status: DISCONTINUED | OUTPATIENT
Start: 2025-01-07 | End: 2025-01-09 | Stop reason: HOSPADM

## 2025-01-07 RX ORDER — CALCIUM GLUCONATE 20 MG/ML
2000 INJECTION, SOLUTION INTRAVENOUS ONCE
Status: DISCONTINUED | OUTPATIENT
Start: 2025-01-07 | End: 2025-01-07

## 2025-01-07 RX ORDER — CIPROFLOXACIN HYDROCHLORIDE 3.5 MG/ML
1 SOLUTION/ DROPS TOPICAL
Status: DISCONTINUED | OUTPATIENT
Start: 2025-01-07 | End: 2025-01-09 | Stop reason: HOSPADM

## 2025-01-07 RX ORDER — ONDANSETRON 2 MG/ML
4 INJECTION INTRAMUSCULAR; INTRAVENOUS EVERY 6 HOURS PRN
Status: DISCONTINUED | OUTPATIENT
Start: 2025-01-07 | End: 2025-01-09 | Stop reason: HOSPADM

## 2025-01-07 RX ORDER — CINACALCET 30 MG/1
90 TABLET, FILM COATED ORAL DAILY
Status: DISCONTINUED | OUTPATIENT
Start: 2025-01-07 | End: 2025-01-08

## 2025-01-07 RX ORDER — GLUCAGON 1 MG/ML
1 KIT INJECTION PRN
Status: DISCONTINUED | OUTPATIENT
Start: 2025-01-07 | End: 2025-01-09 | Stop reason: HOSPADM

## 2025-01-07 RX ORDER — ONDANSETRON 4 MG/1
4 TABLET, ORALLY DISINTEGRATING ORAL EVERY 8 HOURS PRN
Status: DISCONTINUED | OUTPATIENT
Start: 2025-01-07 | End: 2025-01-09 | Stop reason: HOSPADM

## 2025-01-07 RX ORDER — SODIUM CHLORIDE 0.9 % (FLUSH) 0.9 %
5-40 SYRINGE (ML) INJECTION EVERY 12 HOURS SCHEDULED
Status: DISCONTINUED | OUTPATIENT
Start: 2025-01-07 | End: 2025-01-09 | Stop reason: HOSPADM

## 2025-01-07 RX ORDER — HEPARIN SODIUM 5000 [USP'U]/ML
5000 INJECTION, SOLUTION INTRAVENOUS; SUBCUTANEOUS EVERY 8 HOURS SCHEDULED
Status: DISCONTINUED | OUTPATIENT
Start: 2025-01-07 | End: 2025-01-09 | Stop reason: HOSPADM

## 2025-01-07 RX ORDER — LANOLIN ALCOHOL/MO/W.PET/CERES
1000 CREAM (GRAM) TOPICAL DAILY
Status: DISCONTINUED | OUTPATIENT
Start: 2025-01-07 | End: 2025-01-09 | Stop reason: HOSPADM

## 2025-01-07 RX ORDER — AMLODIPINE BESYLATE 5 MG/1
10 TABLET ORAL DAILY
Status: DISCONTINUED | OUTPATIENT
Start: 2025-01-07 | End: 2025-01-07

## 2025-01-07 RX ORDER — HYDRALAZINE HYDROCHLORIDE 20 MG/ML
10 INJECTION INTRAMUSCULAR; INTRAVENOUS EVERY 4 HOURS PRN
Status: DISCONTINUED | OUTPATIENT
Start: 2025-01-07 | End: 2025-01-09 | Stop reason: HOSPADM

## 2025-01-07 RX ORDER — DEXTROSE MONOHYDRATE 100 MG/ML
INJECTION, SOLUTION INTRAVENOUS CONTINUOUS PRN
Status: DISCONTINUED | OUTPATIENT
Start: 2025-01-07 | End: 2025-01-09 | Stop reason: HOSPADM

## 2025-01-07 RX ORDER — LABETALOL 200 MG/1
200 TABLET, FILM COATED ORAL 2 TIMES DAILY
Status: DISCONTINUED | OUTPATIENT
Start: 2025-01-07 | End: 2025-01-09 | Stop reason: HOSPADM

## 2025-01-07 RX ORDER — HEPARIN SODIUM 1000 [USP'U]/ML
3200 INJECTION, SOLUTION INTRAVENOUS; SUBCUTANEOUS ONCE
Status: DISCONTINUED | OUTPATIENT
Start: 2025-01-07 | End: 2025-01-09 | Stop reason: SDUPTHER

## 2025-01-07 RX ORDER — DEXTROSE MONOHYDRATE 25 G/50ML
25 INJECTION, SOLUTION INTRAVENOUS PRN
Status: DISCONTINUED | OUTPATIENT
Start: 2025-01-07 | End: 2025-01-07

## 2025-01-07 RX ORDER — DEXTROSE MONOHYDRATE 100 MG/ML
INJECTION, SOLUTION INTRAVENOUS CONTINUOUS PRN
Status: DISCONTINUED | OUTPATIENT
Start: 2025-01-07 | End: 2025-01-07

## 2025-01-07 RX ORDER — DEXTROSE MONOHYDRATE 25 G/50ML
25 INJECTION, SOLUTION INTRAVENOUS PRN
Status: DISCONTINUED | OUTPATIENT
Start: 2025-01-07 | End: 2025-01-09 | Stop reason: HOSPADM

## 2025-01-07 RX ORDER — SEVELAMER CARBONATE 800 MG/1
800 TABLET, FILM COATED ORAL
Status: DISCONTINUED | OUTPATIENT
Start: 2025-01-07 | End: 2025-01-09 | Stop reason: HOSPADM

## 2025-01-07 RX ADMIN — CIPROFLOXACIN 1 DROP: 3 SOLUTION OPHTHALMIC at 18:06

## 2025-01-07 RX ADMIN — CIPROFLOXACIN 1 DROP: 3 SOLUTION OPHTHALMIC at 05:46

## 2025-01-07 RX ADMIN — HEPARIN SODIUM 5000 UNITS: 5000 INJECTION INTRAVENOUS; SUBCUTANEOUS at 14:00

## 2025-01-07 RX ADMIN — SODIUM CHLORIDE, PRESERVATIVE FREE 10 ML: 5 INJECTION INTRAVENOUS at 22:18

## 2025-01-07 RX ADMIN — INSULIN HUMAN 10 UNITS: 100 INJECTION, SOLUTION PARENTERAL at 04:32

## 2025-01-07 RX ADMIN — AMLODIPINE BESYLATE 10 MG: 5 TABLET ORAL at 06:51

## 2025-01-07 RX ADMIN — SEVELAMER CARBONATE 800 MG: 800 TABLET, FILM COATED ORAL at 17:00

## 2025-01-07 RX ADMIN — LABETALOL HYDROCHLORIDE 200 MG: 200 TABLET, FILM COATED ORAL at 06:50

## 2025-01-07 RX ADMIN — SEVELAMER CARBONATE 800 MG: 800 TABLET, FILM COATED ORAL at 10:49

## 2025-01-07 RX ADMIN — SODIUM BICARBONATE 50 MEQ: 84 INJECTION, SOLUTION INTRAVENOUS at 10:49

## 2025-01-07 RX ADMIN — CALCIUM GLUCONATE 1000 MG: 20 INJECTION, SOLUTION INTRAVENOUS at 17:30

## 2025-01-07 RX ADMIN — CALCIUM GLUCONATE 1000 MG: 98 INJECTION, SOLUTION INTRAVENOUS at 10:49

## 2025-01-07 RX ADMIN — SODIUM BICARBONATE 50 MEQ: 84 INJECTION, SOLUTION INTRAVENOUS at 01:12

## 2025-01-07 RX ADMIN — HYDRALAZINE HYDROCHLORIDE 100 MG: 50 TABLET ORAL at 17:05

## 2025-01-07 RX ADMIN — SEVELAMER CARBONATE 800 MG: 800 TABLET, FILM COATED ORAL at 12:05

## 2025-01-07 RX ADMIN — DEXTROSE MONOHYDRATE 25 G: 25 INJECTION, SOLUTION INTRAVENOUS at 03:56

## 2025-01-07 RX ADMIN — SODIUM ZIRCONIUM CYCLOSILICATE 10 G: 5 POWDER, FOR SUSPENSION ORAL at 10:49

## 2025-01-07 RX ADMIN — DEXTROSE MONOHYDRATE 25 G: 25 INJECTION, SOLUTION INTRAVENOUS at 07:26

## 2025-01-07 RX ADMIN — CIPROFLOXACIN 1 DROP: 3 SOLUTION OPHTHALMIC at 22:19

## 2025-01-07 RX ADMIN — CALCIUM GLUCONATE 1000 MG: 98 INJECTION, SOLUTION INTRAVENOUS at 04:20

## 2025-01-07 RX ADMIN — CYANOCOBALAMIN TAB 1000 MCG 1000 MCG: 1000 TAB at 10:49

## 2025-01-07 RX ADMIN — HYDRALAZINE HYDROCHLORIDE 100 MG: 50 TABLET ORAL at 06:51

## 2025-01-07 RX ADMIN — CALCIUM GLUCONATE 2000 MG: 20 INJECTION, SOLUTION INTRAVENOUS at 23:14

## 2025-01-07 ASSESSMENT — PAIN SCALES - GENERAL
PAINLEVEL_OUTOF10: 0

## 2025-01-07 ASSESSMENT — PAIN DESCRIPTION - LOCATION: LOCATION: HEAD

## 2025-01-07 ASSESSMENT — PAIN DESCRIPTION - PAIN TYPE: TYPE: ACUTE PAIN

## 2025-01-07 ASSESSMENT — PAIN SCALES - WONG BAKER
WONGBAKER_NUMERICALRESPONSE: HURTS EVEN MORE
WONGBAKER_NUMERICALRESPONSE: NO HURT

## 2025-01-07 ASSESSMENT — PAIN DESCRIPTION - ONSET: ONSET: PROGRESSIVE

## 2025-01-07 NOTE — FLOWSHEET NOTE
cannula  (3L)   Bilateral Breath Sounds Clear   Skin Color Other (comment)  (Appropriate)   Skin Condition/Temp Warm;Dry;Flaky   Appetite Good   Abdomen Inspection Soft   Bowel Sounds (All Quadrants) Active   Last BM (including prior to admit) 01/07/25   Edema Left lower extremity   Edema Generalized Trace   LLE Edema Other (comment)  (foot)   Comments post tx assessment   Vital Signs   BP (!) 159/61   Temp 97.7 °F (36.5 °C)   Pulse 88   Respirations 17   SpO2 100 %   Weight - Scale 79.8 kg (175 lb 14.8 oz)   Weight Method Bed scale   Percent Weight Change -3.86   Pain Assessment   Pain Assessment None - Denies Pain

## 2025-01-07 NOTE — ED NOTES
Attempted to provide report to [address] [place] [address] for transfer of care. This RN was unable to complete report. Rn on phone stated a refusal to take pt due to blood pressure. MD already aware of current pt status.

## 2025-01-07 NOTE — ACP (ADVANCE CARE PLANNING)
Advance Care Planning       Palliative Team Advance Care Planning (ACP) Conversation        Date of Conversation: 01/07/25      Individuals present for the conversation: Patient with decision making capacity, patient's ex-wife (Suzi Winters) via phone, Leelee Chaidez NP (Palliative) and this writer.       ACP documents on file prior to discussion:  -Advance Medical Directive (AMD)      Previously completed document/s not on file: Patient / participant reports that there are no previously executed ACP documents.      Healthcare Decision Maker:    Patient has an existing advance medical directive (AMD) that has been scanned in to the EMR. The AMD names his brother (Heather Winters) as his primary decision maker and his wife, with whom he is  from, (Suzi Wniters), is his secondary decision maker.       Primary Decision Maker: HEATHER WINTERS - Brother/Sister - [phone]    Secondary Decision Maker: SUZI WINTERS - Ex-Spouse - [phone]       Conversation Summary:      This writer, along with Leelee Chaidez NP, with the Palliative Care team; visited with patient today to offer support and also review the existing AMD document and discuss goals of care moving forward. Patient was laying in bed and speaking to his ex-wife (Suzi) on the phone. He put Suzi on speaker phone so that she could participate in the discussion.     Patient reported that he resides alone and had been independent with all of his ADLs and IADLs. Patient uses a rollator for mobility purposes and he still drives. Patient's health and overall functioning have been declining recently. He is now on home O2 and he is requiring more assistance. Patient's ex-wife is very fearful of him returning home without extra assistance at home.     Patient does not have Medicaid, however, he reported that he use to have Medicaid and then was cut off. Care management Manager was notified of this and will ensure that patient is

## 2025-01-07 NOTE — ED NOTES
TRANSFER - OUT REPORT:    Verbal report given to SANIA Head on Evan Huizar  being transferred to ICU for routine progression of patient care       Report consisted of patient's Situation, Background, Assessment and   Recommendations(SBAR).     Information from the following report(s) Nurse Handoff Report, Index, ED Encounter Summary, ED SBAR, Adult Overview, Intake/Output, MAR, and Recent Results was reviewed with the receiving nurse.    [place] Fall Assessment:    Presents to emergency department  because of falls (Syncope, seizure, or loss of consciousness): Yes  Age > 70: No  Altered Mental Status, Intoxication with alcohol or substance confusion (Disorientation, impaired judgment, poor safety awaremess, or inability to follow instructions): No  Impaired Mobility: Ambulates or transfers with assistive devices or assistance; Unable to ambulate or transer.: Yes  Nursing Judgement: No          Lines:   Peripheral IV 01/07/25 Right Antecubital (Active)       Tunneled Hemodialysis Catheter Right Subclavian (Active)        Opportunity for questions and clarification was provided.      Patient transported with:  Registered Nurse

## 2025-01-07 NOTE — CARE COORDINATION
RNCM spoke with patient at bedside related to readmission, discharge plan, goals of care and Medicaid application.  Patient reports beginning Medicaid application but never finishing.  Patient reports being in the hospital 3 days ago.  GOC are to get life together.  Patient reports being independent with ADLs/IADLs, ambulation.  Drives-SUV.  Patient reports having a brother that lives nearby but is having own health issues at this time.  Patient reports no one can assist with Medicaid application.   Patient reports wanting to go home at discharge but is willing to go to SNF if recommended.  RNCM to follow up with patient 01/08 for SNF preference list and Medicaid application.     FARIDA: 01/10    Discharge: SNF    Barriers: IV infusions; oxygen-wean; mult abn labs; PT/OT eval; consults    Needs: pending medical outcomes    CM will continue to assess for discharge needs.

## 2025-01-07 NOTE — ED NOTES
Attempted to call report to RN on [facility] for transfer of care. Notified that nurse was unavailable and would be calling ED back for handoff report.

## 2025-01-07 NOTE — ED TRIAGE NOTES
Pt to ed via ems c/o of fall and weakness. Pt reports hitting right side of his head during fall. Pt appears lethargic and unkempt. Ems BG 96.

## 2025-01-07 NOTE — ED PROVIDER NOTES
Use: Medium Risk (12/30/2024)    Patient History    • Smoking Tobacco Use: Former    • Smokeless Tobacco Use: Never    • Passive Exposure: Not on file   Alcohol Use: Not At Risk (1/7/2025)    AUDIT-C    • Frequency of Alcohol Consumption: Never    • Average Number of Drinks: Patient does not drink    • Frequency of Binge Drinking: Never   Financial Resource Strain: Not on file   Food Insecurity: No Food Insecurity (12/27/2024)    Hunger Vital Sign    • Worried About Running Out of Food in the Last Year: Never true    • Ran Out of Food in the Last Year: Never true   Transportation Needs: No Transportation Needs (12/27/2024)    PRAPARE - Transportation    • Lack of Transportation (Medical): No    • Lack of Transportation (Non-Medical): No   Physical Activity: Not on file   Stress: Not on file   Social Connections: Feeling Socially Integrated (11/15/2024)    Received from [facility]    OASIS : Social Isolation    • Frequency of experiencing loneliness or isolation: Never   Intimate Partner Violence: Unknown (10/7/2024)    Received from [facility]    Humiliation, Afraid, Rape, and Kick questionnaire    • Fear of Current or Ex-Partner: Not on file    • Emotionally Abused: Not on file    • Physically Abused: Patient unable to answer    • Sexually Abused: Not on file   Depression: Not at risk (11/2/2020)    [facility] [employer] [facility] [employer]    PHQ-2    • PHQ-2 Total Score: 0   Housing Stability: Low Risk  (12/27/2024)    Housing Stability Vital Sign    • Unable to Pay for Housing in the Last Year: No    • Number of Times Moved in the Last Year: 1    • Homeless in the Last Year: No   Interpersonal Safety: Not At Risk (1/7/2025)    Interpersonal Safety Domain Source: IP Abuse Screening    • Physical abuse: Denies    • Verbal abuse: Denies    • Emotional abuse: Denies    • Financial abuse: Denies    • Sexual abuse: Denies   Utilities: Not At Risk (12/27/2024)    [facility] Utilities    •

## 2025-01-07 NOTE — H&P
Last 3 Glucose:   Recent Labs     01/07/25  0105 01/07/25  0638   GLUCOSE 62* 45*      POC Glucose:   Lab Results   Component Value Date/Time    POCGLU 173 01/07/2025 07:34 AM      Last 3 POC Glucose:   Recent Labs     01/07/25  0423 01/07/25  0451 01/07/25  0734   POCGLU 147* 181* 173*      Last 3 CK, CKMB, Troponin:   Recent Labs     01/07/25  0105 01/07/25  0638   CKTOTAL 322* 377*      CBC:   Lab Results   Component Value Date/Time    WBC 13.9 01/07/2025 01:05 AM    RBC 3.11 01/07/2025 01:05 AM    HGB 8.5 01/07/2025 01:05 AM    HCT 25.2 01/07/2025 01:05 AM    MCV 81.0 01/07/2025 01:05 AM    MCH 27.3 01/07/2025 01:05 AM    MCHC 33.7 01/07/2025 01:05 AM    RDW 17.7 01/07/2025 01:05 AM     01/07/2025 01:05 AM    MPV 10.6 12/26/2024 05:30 PM      Last 3 Hemoglobin/Hematocrit:   Recent Labs     01/07/25 0105   HGB 8.5*   HCT 25.2*      Recent Labs     01/07/25 0105   ALKPHOS 123*   ALT 58   AST 39*   BILITOT 0.5      Albumin: No results found for: \"LABALBU\"   Calcium:   Lab Results   Component Value Date/Time    CALCIUM 6.5 01/07/2025 06:38 AM      Ionized Calcium: No components found for: \"IONCA\"     Imaging results last 24 hrs :XR CHEST PORTABLE    Result Date: 1/7/2025  EXAM:  XR CHEST PORTABLE INDICATION: SOB COMPARISON: December 26, 2024 TECHNIQUE: portable chest AP view FINDINGS: Right internal jugular tunneled dialysis catheter is stable in position. Heart size remains enlarged there is mild pulmonary interstitial edema. No new airspace disease or pleural effusion is evident. The visualized bones and upper abdomen are age-appropriate.     Unchanged cardiomegaly. Mild pulmonary edema. Electronically signed by Mina Mclain    Imaging results impression onlyXR CHEST PORTABLE    Result Date: 1/7/2025  Unchanged cardiomegaly. Mild pulmonary edema. Electronically signed by Mina Mclain     XR CHEST PORTABLE   Final Result      Unchanged cardiomegaly. Mild pulmonary edema.         Electronically signed by

## 2025-01-08 LAB
ALBUMIN SERPL-MCNC: 2.9 G/DL (ref 3.4–5)
ANION GAP SERPL CALC-SCNC: 9 MMOL/L (ref 3–18)
BUN SERPL-MCNC: 48 MG/DL (ref 7–18)
BUN/CREAT SERPL: 5 (ref 12–20)
CA-I SERPL-SCNC: 0.92 MMOL/L (ref 1.12–1.32)
CALCIUM SERPL-MCNC: 7.7 MG/DL (ref 8.5–10.1)
CHLORIDE SERPL-SCNC: 102 MMOL/L (ref 100–111)
CO2 SERPL-SCNC: 23 MMOL/L (ref 21–32)
CREAT SERPL-MCNC: 8.75 MG/DL (ref 0.6–1.3)
ERYTHROCYTE [DISTWIDTH] IN BLOOD BY AUTOMATED COUNT: 17.5 % (ref 11.6–14.5)
GLUCOSE BLD STRIP.AUTO-MCNC: 78 MG/DL (ref 70–110)
GLUCOSE BLD STRIP.AUTO-MCNC: 86 MG/DL (ref 70–110)
GLUCOSE SERPL-MCNC: 95 MG/DL (ref 74–99)
HCT VFR BLD AUTO: 28 % (ref 36–48)
HGB BLD-MCNC: 9.6 G/DL (ref 13–16)
MAGNESIUM SERPL-MCNC: 2.4 MG/DL (ref 1.6–2.6)
MCH RBC QN AUTO: 27.4 PG (ref 24–34)
MCHC RBC AUTO-ENTMCNC: 34.3 G/DL (ref 31–37)
MCV RBC AUTO: 80 FL (ref 78–100)
NRBC # BLD: 0 K/UL (ref 0–0.01)
NRBC BLD-RTO: 0 PER 100 WBC
PHOSPHATE SERPL-MCNC: 6.4 MG/DL (ref 2.5–4.9)
PLATELET # BLD AUTO: 149 K/UL (ref 135–420)
PMV BLD AUTO: 10.7 FL (ref 9.2–11.8)
POTASSIUM SERPL-SCNC: 4.8 MMOL/L (ref 3.5–5.5)
POTASSIUM SERPL-SCNC: 5 MMOL/L (ref 3.5–5.5)
RBC # BLD AUTO: 3.5 M/UL (ref 4.35–5.65)
SODIUM SERPL-SCNC: 134 MMOL/L (ref 136–145)
WBC # BLD AUTO: 8.4 K/UL (ref 4.6–13.2)

## 2025-01-08 PROCEDURE — 6360000002 HC RX W HCPCS: Performed by: INTERNAL MEDICINE

## 2025-01-08 PROCEDURE — 36415 COLL VENOUS BLD VENIPUNCTURE: CPT

## 2025-01-08 PROCEDURE — 90935 HEMODIALYSIS ONE EVALUATION: CPT

## 2025-01-08 PROCEDURE — 82962 GLUCOSE BLOOD TEST: CPT

## 2025-01-08 PROCEDURE — 6370000000 HC RX 637 (ALT 250 FOR IP): Performed by: INTERNAL MEDICINE

## 2025-01-08 PROCEDURE — 82330 ASSAY OF CALCIUM: CPT

## 2025-01-08 PROCEDURE — 84132 ASSAY OF SERUM POTASSIUM: CPT

## 2025-01-08 PROCEDURE — 1100000003 HC PRIVATE W/ TELEMETRY

## 2025-01-08 PROCEDURE — 85027 COMPLETE CBC AUTOMATED: CPT

## 2025-01-08 PROCEDURE — 2700000000 HC OXYGEN THERAPY PER DAY

## 2025-01-08 PROCEDURE — 97165 OT EVAL LOW COMPLEX 30 MIN: CPT

## 2025-01-08 PROCEDURE — 83735 ASSAY OF MAGNESIUM: CPT

## 2025-01-08 PROCEDURE — 80069 RENAL FUNCTION PANEL: CPT

## 2025-01-08 PROCEDURE — 97161 PT EVAL LOW COMPLEX 20 MIN: CPT

## 2025-01-08 PROCEDURE — 97116 GAIT TRAINING THERAPY: CPT

## 2025-01-08 PROCEDURE — 2500000003 HC RX 250 WO HCPCS: Performed by: INTERNAL MEDICINE

## 2025-01-08 RX ADMIN — HYDRALAZINE HYDROCHLORIDE 100 MG: 50 TABLET ORAL at 04:52

## 2025-01-08 RX ADMIN — SODIUM CHLORIDE, PRESERVATIVE FREE 10 ML: 5 INJECTION INTRAVENOUS at 09:04

## 2025-01-08 RX ADMIN — CIPROFLOXACIN 1 DROP: 3 SOLUTION OPHTHALMIC at 09:04

## 2025-01-08 RX ADMIN — SEVELAMER CARBONATE 800 MG: 800 TABLET, FILM COATED ORAL at 09:04

## 2025-01-08 RX ADMIN — SEVELAMER CARBONATE 800 MG: 800 TABLET, FILM COATED ORAL at 12:33

## 2025-01-08 RX ADMIN — CIPROFLOXACIN 1 DROP: 3 SOLUTION OPHTHALMIC at 04:52

## 2025-01-08 RX ADMIN — CIPROFLOXACIN 1 DROP: 3 SOLUTION OPHTHALMIC at 12:33

## 2025-01-08 RX ADMIN — HEPARIN SODIUM 5000 UNITS: 5000 INJECTION INTRAVENOUS; SUBCUTANEOUS at 22:30

## 2025-01-08 RX ADMIN — HEPARIN SODIUM 5000 UNITS: 5000 INJECTION INTRAVENOUS; SUBCUTANEOUS at 04:52

## 2025-01-08 RX ADMIN — CYANOCOBALAMIN TAB 1000 MCG 1000 MCG: 1000 TAB at 09:04

## 2025-01-08 RX ADMIN — HEPARIN SODIUM 5000 UNITS: 5000 INJECTION INTRAVENOUS; SUBCUTANEOUS at 16:09

## 2025-01-08 RX ADMIN — ACETAMINOPHEN 650 MG: 325 TABLET ORAL at 22:55

## 2025-01-08 RX ADMIN — SODIUM CHLORIDE, PRESERVATIVE FREE 10 ML: 5 INJECTION INTRAVENOUS at 22:30

## 2025-01-08 RX ADMIN — CIPROFLOXACIN 1 DROP: 3 SOLUTION OPHTHALMIC at 16:08

## 2025-01-08 RX ADMIN — HYDRALAZINE HYDROCHLORIDE 100 MG: 50 TABLET ORAL at 16:08

## 2025-01-08 RX ADMIN — LABETALOL HYDROCHLORIDE 200 MG: 200 TABLET, FILM COATED ORAL at 09:04

## 2025-01-08 ASSESSMENT — PAIN SCALES - WONG BAKER
WONGBAKER_NUMERICALRESPONSE: NO HURT
WONGBAKER_NUMERICALRESPONSE: NO HURT

## 2025-01-08 ASSESSMENT — PAIN DESCRIPTION - LOCATION
LOCATION: HEAD
LOCATION: HEAD

## 2025-01-08 ASSESSMENT — PAIN SCALES - GENERAL
PAINLEVEL_OUTOF10: 6
PAINLEVEL_OUTOF10: 6
PAINLEVEL_OUTOF10: 0
PAINLEVEL_OUTOF10: 1
PAINLEVEL_OUTOF10: 0

## 2025-01-08 ASSESSMENT — PAIN - FUNCTIONAL ASSESSMENT: PAIN_FUNCTIONAL_ASSESSMENT: ACTIVITIES ARE NOT PREVENTED

## 2025-01-08 ASSESSMENT — PAIN DESCRIPTION - DESCRIPTORS: DESCRIPTORS: ACHING;THROBBING

## 2025-01-08 ASSESSMENT — PAIN DESCRIPTION - ORIENTATION: ORIENTATION: ANTERIOR

## 2025-01-08 NOTE — CONSULTS
the past and is accepting of them again in the future. We encourage him to think about how he feels about a tracheostomy and long-term ventilation, and to discuss his feelings about these interventions with his medical decision surrogates. He says he wishes to continue with dialysis at this time and has resource needs to facilitate a safe living arrangement.       Please refer to Palliative Medicine ACP notes for further details.    PALLIATIVE ASSESSMENT:      Palliative Performance Scale (PPS):  PPS: 60    Modified ESAS:  Modified-[place] Symptom Assessment Scale (ESAS)  Tiredness Score: 5  Depression Score: 5  Pain Score: 5 (headache from object falling on head during syncopal episode)  Anxiety Score: 5  Nausea Score: Not nauseated  Dyspnea Score: No shortness of breath    Clinical Pain Assessment (nonverbal scale for severity on nonverbal patients):   Clinical Pain Assessment  Severity: 5  Location: head, top right  Character: ache  Factors: where router fell on head during syncopal episode       Vital Signs: Blood pressure (!) 159/61, pulse 88, temperature 97.7 °F (36.5 °C), resp. rate 17, height 1.702 m (5' 7\"), weight 79.4 kg (175 lb), SpO2 100%.    PHYSICAL ASSESSMENT:   General: [x] Oriented x4  [] Well appearing  [] Intubated  []Ill appearing  [x]Other: right eye sclera is red, both eyes tearing/crusting  Mental Status: [x] Normal mental status exam  [] Drowsy  [] Confused  [x]Other: reports losing time yesterday after syncopal episode, cannot recall Monday  Cardiovascular: [] Regular rate/rhythm  [] Arrhythmia  [] Other:  Chest: [x] Effort normal  []Lungs clear  [] Respiratory distress  []Tachypnea  [] Other:  Abdomen: [] Soft/non-tender  [x] Normal appearance  [] Distended  [] Ascites  [] Other:  Neurological: [x] Normal speech  [] Normal sensation  []Deficits present:  Extremity: [] Normal skin color/temp  [] Clubbing/cyanosis  [] No edema  [x] Other: dry leathery areas    Wt Readings from Last 15 
hydralazine, labetalol   for hypertension , if remained elevated post HD today       Rakesh Vincent MD, [place]  [facility]       Total of 43   minutes of critical care time spent in the direct evaluation and formulation of treatment plan of this high risk patient. The reason for providing this level of medical care was due a critical illness that impaired one or more vital organ systems such that there was a high probability of imminent or life threatening deterioration in the patients condition. This care involved high complexity decision making to assess, manipulate, and support vital system functions, to treat this degreee vital organ system failure and to prevent further life threatening deterioration of the patient’s condition.    Exanation, review and interpretation of labs scan/CXR/USG , medicine , order labs/medicine  and care coordination                       
\"[id number]\", \"T4FREE\", \"[id number]\" in the last 72 hours.     Urinalysis No results found for: \"COLORU\", \"CLARITYU\", \"SPECGRAV\", \"PHUR\", \"PROTEINU\", \"GLUCOSEU\", \"KETUA\", \"BILIRUBINUR\", \"NITRU\", \"LEUKOCYTESUR\"     Micro  Results       Procedure Component Value Units Date/Time    Culture, Anaerobic [[id number]] Collected: 12/31/24 0747    Order Status: Completed Specimen: Swab from Groin Updated: 01/02/25 1028     Special Requests NO SPECIAL REQUESTS        Culture NO ANAEROBES ISOLATED       Culture, Wound (with Gram Stain) [[id number]] Collected: 12/31/24 0747    Order Status: Completed Specimen: Swab from Groin Updated: 01/02/25 1028     Special Requests NO SPECIAL REQUESTS        Gram Stain NO WBC'S SEEN         NO ORGANISMS SEEN        Culture LIGHT MIXED ENTERIC ROSA       Ova and Parasite Examination [[id number]] Collected: 12/26/24 2021    Order Status: Completed Specimen: Stool Updated: 01/02/25 1035     Sample Site STOOL        Ova + Parasite Exam Final Report Below        Comment: (NOTE)  These results were obtained using wet preparation(s) and trichrome  stained smear. This test does not include testing for  Cryptosporidium parvum, Cyclospora, or Microsporidia.  Performed At: [address] [id number]  Andreas BUNN MD Ph:[phone]         Enteric Bact Panel, DNA [[id number]] Collected: 12/26/24 2021    Order Status: Completed Specimen: Stool Updated: 12/27/24 2213     SHIGELLA SPECIES, DNA Negative        CAMPYLOBACTER SPECIES, DNA Negative        VIBRIO SPECIES, DNA Negative        ENTEROTOXIGEN E COLI, DNA Negative        SHIGA TOXIN PRODUCING, DNA Negative        SALMONELLA SPECIES, DNA Negative        P. SHIGELLOIDES, DNA Negative        Y. ENTEROCOLITICA, DNA Negative       Chlamydia, Gonorrhea, Trichomoniasis [[id number]] Collected: 12/26/24 1945    Order Status: Canceled Specimen: Urine                 ECHO  09/25/24    ECHO (TTE) COMPLETE (PRN 
SHIGELLOIDES, DNA Negative        Y. ENTEROCOLITICA, DNA Negative       Chlamydia, Gonorrhea, Trichomoniasis [[id number]] Collected: 12/26/24 1945    Order Status: Canceled Specimen: Urine                 ECHO  09/25/24    ECHO (TTE) COMPLETE (PRN CONTRAST/BUBBLE/STRAIN/3D) 09/26/2024  5:54 PM (Final)    Interpretation Summary    Image quality is good.    Left Ventricle: Normal left ventricular systolic function with a visually estimated EF of 55 - 60%. Left ventricle size is normal. Moderately increased wall thickness. Findings consistent with moderate concentric hypertrophy. Normal wall motion. Indeterminate diastolic function due to mitral valve disease.    Left Atrium: Left atrium is mildly dilated. Left atrial volume index is mildly increased (35-41 mL/m2).    Right Atrium: Right atrium is mildly dilated.    Aortic Valve: Trileaflet valve. Sclerosis of the aortic valve cusps. No stenosis noted.    Mitral Valve: Severe annular calcification at the posterior leaflet. Mild regurgitation.No stenosis noted. There is is a moderately sized focal calcification in addition to annular calcification present that is fixed, echogenic and spherical on the posterior leaflet.  This calcification was also present on the previous echocardiogram done on 4/6/2024 without any interval change.  Possible differential diagnosis chronic annular and mitral valve calcification versus old healed vegetation.  Finding reviewed with the patient.    Tricuspid Valve: Mild regurgitation. The estimated RVSP is 37 mmHg.    No significant interval change in echocardiogram when compared with old echocardiogram    Signed by: Umang Aiken MD on 9/26/2024  5:54 PM           CT HEAD WO CONTRAST    Result Date: 1/7/2025  INDICATION:   Altered mental status EXAM:  HEAD CT WITHOUT CONTRAST COMPARISON: 9/6/2024 TECHNIQUE:  Routine noncontrast axial head CT was performed. Coronal and sagittal multiplanar reconstructions were obtained.  CT dose reduction

## 2025-01-08 NOTE — CARE COORDINATION
01/08/25 1301   Service Assessment   Patient Orientation Alert and Oriented;Person;Place;Situation;Self   Cognition Alert   History Provided By Patient   Primary Caregiver Self   Accompanied By/Relationship No family members in room during interview   Support Systems Family Members;Friends/Neighbors   Patient's Healthcare Decision Maker is: Legal Next of Kin   PCP Verified by CM   (Dr. Sheets)   Last Visit to PCP Within last 6 months   Prior Functional Level Independent in ADLs/IADLs   Current Functional Level Independent in ADLs/IADLs   Can patient return to prior living arrangement Yes   Ability to make needs known: Good   Family able to assist with home care needs: Other (comment)  (Patient reports his brother lives nearby but currently having his own health problems)   Financial Resources Medicare   Community Resources   (Dialysis)   Social/Functional History   Lives With Alone   Type of Home House   Home Equipment Rollator   Receives Help From Family;Friend(s)   Prior Level of Assist for ADLs Independent   Prior Level of Assist for Homemaking Independent   Homemaking Responsibilities Yes   Ambulation Assistance Independent   Prior Level of Assist for Transfers Independent   Active  Yes   Mode of Transportation Car   Occupation On disability   Discharge Planning   Type of Residence Apartment   Living Arrangements Alone   Current Services Prior To Admission Oxygen Therapy   Potential Assistance Needed Home Care   DME Ordered? No   Potential Assistance Purchasing Medications No   Patient expects to be discharged to: Apartment   Follow Up Appointment: Best Day/Time  Monday AM   One/Two Story Residence One story   History of falls? 0   Services At/After Discharge   Transition of Care Consult (CM Consult) Home Health   Services At/After Discharge None    Resource Information Provided? No   Mode of Transport at Discharge Self  (Will need Lyft)   Confirm Follow Up Transport Self  (Lyft)

## 2025-01-09 VITALS
BODY MASS INDEX: 27.47 KG/M2 | DIASTOLIC BLOOD PRESSURE: 41 MMHG | SYSTOLIC BLOOD PRESSURE: 103 MMHG | HEART RATE: 78 BPM | TEMPERATURE: 99 F | WEIGHT: 175 LBS | OXYGEN SATURATION: 98 % | HEIGHT: 67 IN | RESPIRATION RATE: 18 BRPM

## 2025-01-09 LAB
ALBUMIN SERPL-MCNC: 2.9 G/DL (ref 3.4–5)
ANION GAP SERPL CALC-SCNC: 5 MMOL/L (ref 3–18)
BUN SERPL-MCNC: 26 MG/DL (ref 7–18)
BUN/CREAT SERPL: 5 (ref 12–20)
CA-I SERPL-SCNC: 1.11 MMOL/L (ref 1.12–1.32)
CALCIUM SERPL-MCNC: 9 MG/DL (ref 8.5–10.1)
CHLORIDE SERPL-SCNC: 101 MMOL/L (ref 100–111)
CO2 SERPL-SCNC: 27 MMOL/L (ref 21–32)
CREAT SERPL-MCNC: 4.92 MG/DL (ref 0.6–1.3)
EKG ATRIAL RATE: 145 BPM
EKG DIAGNOSIS: NORMAL
EKG Q-T INTERVAL: 288 MS
EKG QRS DURATION: 126 MS
EKG QTC CALCULATION (BAZETT): 447 MS
EKG R AXIS: 262 DEGREES
EKG T AXIS: 102 DEGREES
EKG VENTRICULAR RATE: 145 BPM
ERYTHROCYTE [DISTWIDTH] IN BLOOD BY AUTOMATED COUNT: 17.2 % (ref 11.6–14.5)
GLUCOSE BLD STRIP.AUTO-MCNC: 88 MG/DL (ref 70–110)
GLUCOSE BLD STRIP.AUTO-MCNC: 92 MG/DL (ref 70–110)
GLUCOSE SERPL-MCNC: 96 MG/DL (ref 74–99)
HCT VFR BLD AUTO: 27.5 % (ref 36–48)
HGB BLD-MCNC: 9.5 G/DL (ref 13–16)
MAGNESIUM SERPL-MCNC: 2.3 MG/DL (ref 1.6–2.6)
MCH RBC QN AUTO: 27.5 PG (ref 24–34)
MCHC RBC AUTO-ENTMCNC: 34.5 G/DL (ref 31–37)
MCV RBC AUTO: 79.5 FL (ref 78–100)
NRBC # BLD: 0 K/UL (ref 0–0.01)
NRBC BLD-RTO: 0 PER 100 WBC
PHOSPHATE SERPL-MCNC: 4.1 MG/DL (ref 2.5–4.9)
PLATELET # BLD AUTO: 152 K/UL (ref 135–420)
PMV BLD AUTO: 10.2 FL (ref 9.2–11.8)
POTASSIUM SERPL-SCNC: 4 MMOL/L (ref 3.5–5.5)
RBC # BLD AUTO: 3.46 M/UL (ref 4.35–5.65)
SODIUM SERPL-SCNC: 133 MMOL/L (ref 136–145)
WBC # BLD AUTO: 7 K/UL (ref 4.6–13.2)

## 2025-01-09 PROCEDURE — 83735 ASSAY OF MAGNESIUM: CPT

## 2025-01-09 PROCEDURE — 6370000000 HC RX 637 (ALT 250 FOR IP): Performed by: STUDENT IN AN ORGANIZED HEALTH CARE EDUCATION/TRAINING PROGRAM

## 2025-01-09 PROCEDURE — 6360000002 HC RX W HCPCS: Performed by: STUDENT IN AN ORGANIZED HEALTH CARE EDUCATION/TRAINING PROGRAM

## 2025-01-09 PROCEDURE — 85027 COMPLETE CBC AUTOMATED: CPT

## 2025-01-09 PROCEDURE — 2700000000 HC OXYGEN THERAPY PER DAY

## 2025-01-09 PROCEDURE — 80069 RENAL FUNCTION PANEL: CPT

## 2025-01-09 PROCEDURE — 36415 COLL VENOUS BLD VENIPUNCTURE: CPT

## 2025-01-09 PROCEDURE — 6360000002 HC RX W HCPCS: Performed by: INTERNAL MEDICINE

## 2025-01-09 PROCEDURE — 6370000000 HC RX 637 (ALT 250 FOR IP): Performed by: INTERNAL MEDICINE

## 2025-01-09 PROCEDURE — 93010 ELECTROCARDIOGRAM REPORT: CPT | Performed by: INTERNAL MEDICINE

## 2025-01-09 PROCEDURE — 2500000003 HC RX 250 WO HCPCS: Performed by: INTERNAL MEDICINE

## 2025-01-09 PROCEDURE — 82330 ASSAY OF CALCIUM: CPT

## 2025-01-09 PROCEDURE — 82962 GLUCOSE BLOOD TEST: CPT

## 2025-01-09 RX ORDER — HEPARIN SODIUM 1000 [USP'U]/ML
3200 INJECTION, SOLUTION INTRAVENOUS; SUBCUTANEOUS ONCE
Status: COMPLETED | OUTPATIENT
Start: 2025-01-09 | End: 2025-01-09

## 2025-01-09 RX ORDER — CIPROFLOXACIN HYDROCHLORIDE 3.5 MG/ML
1 SOLUTION/ DROPS TOPICAL
Qty: 2.5 ML | Refills: 0 | Status: SHIPPED | OUTPATIENT
Start: 2025-01-09 | End: 2025-01-15

## 2025-01-09 RX ADMIN — HEPARIN SODIUM 3200 UNITS: 1000 INJECTION INTRAVENOUS; SUBCUTANEOUS at 02:30

## 2025-01-09 RX ADMIN — HEPARIN SODIUM 5000 UNITS: 5000 INJECTION INTRAVENOUS; SUBCUTANEOUS at 05:44

## 2025-01-09 RX ADMIN — CYANOCOBALAMIN TAB 1000 MCG 1000 MCG: 1000 TAB at 10:44

## 2025-01-09 RX ADMIN — HYDRALAZINE HYDROCHLORIDE 100 MG: 50 TABLET ORAL at 05:44

## 2025-01-09 RX ADMIN — LABETALOL HYDROCHLORIDE 200 MG: 200 TABLET, FILM COATED ORAL at 10:43

## 2025-01-09 RX ADMIN — SEVELAMER CARBONATE 800 MG: 800 TABLET, FILM COATED ORAL at 10:44

## 2025-01-09 RX ADMIN — SODIUM CHLORIDE, PRESERVATIVE FREE 10 ML: 5 INJECTION INTRAVENOUS at 10:44

## 2025-01-09 RX ADMIN — CIPROFLOXACIN 1 DROP: 3 SOLUTION OPHTHALMIC at 05:48

## 2025-01-09 RX ADMIN — CIPROFLOXACIN 1 DROP: 3 SOLUTION OPHTHALMIC at 03:05

## 2025-01-09 RX ADMIN — CIPROFLOXACIN 1 DROP: 3 SOLUTION OPHTHALMIC at 10:44

## 2025-01-09 RX ADMIN — CALCITRIOL 0.5 MCG: 0.25 CAPSULE ORAL at 10:43

## 2025-01-09 ASSESSMENT — PAIN SCALES - GENERAL
PAINLEVEL_OUTOF10: 0

## 2025-01-09 ASSESSMENT — PAIN SCALES - WONG BAKER
WONGBAKER_NUMERICALRESPONSE: NO HURT
WONGBAKER_NUMERICALRESPONSE: NO HURT

## 2025-01-09 NOTE — DISCHARGE INSTRUCTIONS
A message from your hospital medicine team & physician,  Dr. Nakia Roy D.O.      It was a privilege caring for you while you were hospitalized with us.  As physicians specializing in hospital-based care, it is our goal to provide expert, safe & patient-centered care, and to help you feel prepared and empowered with information to ensure a smooth continuation of your care and recovery at home.  I  hope we have addressed your concerns.  Most of all, I wish you the best in your recovery.     You were admitted to the hospital with ***         Please review all of your medications carefully when you get home, and compare them with the list you are receiving today.    New Medications:    1. ***  2. *    Stopped Medications:    1. ***      Things to monitor at home:     ***      ** NO DRIVING is permitted while taking any Narcotics,  benzodiazepines, or other sedating medications       Please seek medical care from your primary doctor, or return to the ER if urgent,  if you experience:       Uncontrolled Bleeding  (Or black, red stools, vomiting blood)     Fall and hitting your head  (ESPECIALLY if taking a blood thinner)-> will need CT scan of head in ER     Worsening leg or body edema/fluid, shortness of breath, cough  or QUICK weight gain on your scale (over days/week, with swelling)     New or concerning symptoms such as fevers, chills, chest pain/racing heart, uncontrolled nausea/vomiting or diarrhea, urinary symptoms, dizziness     New neurologic /stroke-like symptoms   (numbness, tingling, lightheaded, severe headache, vision changes, gait imbalance, confusion, new tremor or seizure)      ** PREVENTATIVE:   * ALL of us should stay Up To Date on Vaccinations  (Pneumonia, Flu, Covid, Shingles,  Etc)--  Talk to your Primary doctor (PCP) about this   * LADIES over 65 years old:   Stay up to date on DEXA Bone Density Scans (monitors for osteoporosis);  Pap smears & Breast Exams   * GENTLEMAN:  Ensure up to date on

## 2025-01-09 NOTE — DISCHARGE SUMMARY
follow up, take all discharge papers with you to your next appointment.    DO Terry Calzada [facility] Hospitalist    CC: Deondre Sheets MD

## 2025-01-09 NOTE — PLAN OF CARE
INTERVENTION:  HEMODYNAMIC STABILIZATION  MAINTAIN BP WNL WHILE ON HD.     INTERVENTION:  FLUID MANAGEMENT  WILL ATTEMPT 3000 ML TOTAL FLUID REMOVAL AS TOLERATED.     INTERVENTION:  METABOLIC/ELECTROLYTE MANAGEMENT  2.0 POTASSIUM,  3.0 CALCIUM DIALYSATE USED WITH HD TODAY.     INTERVENTION:  HEMODIALYSIS ACCESS SITE MANAGEMENT  RIGHT SUBCLAVIAN CVC USING ASEPTIC TECHNIQUE.     GOAL:  SIGNS AND SYMPTOMS OF LISTED POTENTIAL PROBLEMS WILL BE ABSENT OR MANAGEABLE.     OUTCOME:  PROGRESSING.     HD PLANNED FOR 4 HOURS TODAY.    Problem: Chronic Conditions and Co-morbidities  Goal: Patient's chronic conditions and co-morbidity symptoms are monitored and maintained or improved  Outcome: Progressing                    
  Problem: Chronic Conditions and Co-morbidities  Goal: Patient's chronic conditions and co-morbidity symptoms are monitored and maintained or improved  1/8/2025 2318 by Clay Pichardo, RN  Outcome: Progressing  1/8/2025 2312 by Annamaria Branham, RN  Outcome: Progressing     Problem: Pain  Goal: Verbalizes/displays adequate comfort level or baseline comfort level  Outcome: Progressing     Problem: Safety - Adult  Goal: Free from fall injury  Outcome: Progressing     Problem: Skin/Tissue Integrity  Goal: Absence of new skin breakdown  Description: 1.  Monitor for areas of redness and/or skin breakdown  2.  Assess vascular access sites hourly  3.  Every 4-6 hours minimum:  Change oxygen saturation probe site  4.  Every 4-6 hours:  If on nasal continuous positive airway pressure, respiratory therapy assess nares and determine need for appliance change or resting period.  Outcome: Progressing     
  Problem: Chronic Conditions and Co-morbidities  Goal: Patient's chronic conditions and co-morbidity symptoms are monitored and maintained or improved  Outcome: Progressing     Problem: Discharge Planning  Goal: Discharge to home or other facility with appropriate resources  Outcome: Progressing     Problem: Pain  Goal: Verbalizes/displays adequate comfort level or baseline comfort level  Outcome: Progressing     Problem: Safety - Adult  Goal: Free from fall injury  Outcome: Progressing     Problem: Skin/Tissue Integrity  Goal: Absence of new skin breakdown  Description: 1.  Monitor for areas of redness and/or skin breakdown  2.  Assess vascular access sites hourly  3.  Every 4-6 hours minimum:  Change oxygen saturation probe site  4.  Every 4-6 hours:  If on nasal continuous positive airway pressure, respiratory therapy assess nares and determine need for appliance change or resting period.  Outcome: Progressing     Problem: Respiratory - Adult  Goal: Achieves optimal ventilation and oxygenation  Outcome: Progressing  Flowsheets (Taken 1/7/2025 1030)  Achieves optimal ventilation and oxygenation:   Assess for changes in respiratory status   Assess for changes in mentation and behavior   Position to facilitate oxygenation and minimize respiratory effort   Oxygen supplementation based on oxygen saturation or arterial blood gases   Assess the need for suctioning and aspirate as needed   Respiratory therapy support as indicated     Problem: Cardiovascular - Adult  Goal: Maintains optimal cardiac output and hemodynamic stability  Outcome: Progressing  Flowsheets (Taken 1/7/2025 2032)  Maintains optimal cardiac output and hemodynamic stability:   Monitor blood pressure and heart rate   Assess for signs of decreased cardiac output  Goal: Absence of cardiac dysrhythmias or at baseline  Outcome: Progressing  Flowsheets (Taken 1/7/2025 2032)  Absence of cardiac dysrhythmias or at baseline:   Monitor cardiac rate and 
  Problem: Discharge Planning  Goal: Discharge to home or other facility with appropriate resources  Outcome: Adequate for Discharge  Flowsheets (Taken 1/9/2025 1430)  Discharge to home or other facility with appropriate resources:   Identify barriers to discharge with patient and caregiver   Arrange for needed discharge resources and transportation as appropriate   Identify discharge learning needs (meds, wound care, etc)   Arrange for interpreters to assist at discharge as needed     Problem: Pain  Goal: Verbalizes/displays adequate comfort level or baseline comfort level  Outcome: Adequate for Discharge  Flowsheets (Taken 1/7/2025 1930 by Traci Ferreira RN)  Verbalizes/displays adequate comfort level or baseline comfort level:   Encourage patient to monitor pain and request assistance   Assess pain using appropriate pain scale   Administer analgesics based on type and severity of pain and evaluate response   Implement non-pharmacological measures as appropriate and evaluate response   Notify Licensed Independent Practitioner if interventions unsuccessful or patient reports new pain   Consider cultural and social influences on pain and pain management     
Problem: Hemodialysis  Goal: Safe, Effective Therapy Delivery  UF goal: 4500mL   UF Net: 4000mL BFR: 400  Outcome: Progressing  Intervention: Optimize Device Care and Function Circuit Management:   air detection alarms on   circuit line warming device in use  Medication Review/Management: medications reviewed  Notify MD as needed for orders to maintain UF goal and BFR.   Monitor vitals q 15 minutes or more frequently (ex: hypotension/hypertension)     Goal: Effective Tissue Perfusion  Outcome: Progressing    Goal: Absence of Infection Signs and Symptoms  Outcome: Progressing  Intervention: Prevent or Manage Infection    Infection Prevention:   hand hygiene promoted   personal protective equipment utilized   rest/sleep promoted  Infection Management: aseptic technique maintained    Problem: Chronic Conditions and Co-morbidities  Goal: Patient's chronic conditions and co-morbidity symptoms are monitored and maintained or improved  Outcome: Progressing  Care Plan - Patient's Chronic Conditions and Co-Morbidity Symptoms are Monitored and Maintained or Improved:   Collaborate with multidisciplinary team to address chronic and comorbid conditions and prevent exacerbation or deterioration   Monitor and assess patient's chronic conditions and comorbid symptoms for stability, deterioration, or improvement     Problem: Safety - Adult  Goal: Free from fall injury  Outcome: Progressing  Free From Fall Injury:   Instruct family/caregiver on patient safety   Based on caregiver fall risk screen, instruct family/caregiver to ask for assistance with transferring infant if caregiver noted to have fall risk factors     Problem: Pain  Goal: Verbalizes/displays adequate comfort level or baseline comfort level  Outcome: Progressing  Outcome: Progressing  Verbalizes/displays adequate comfort level or baseline comfort level:   Encourage patient to monitor pain and request assistance   Administer analgesics based on type and severity of 
maintained within prescribed range:   Monitor blood glucose as ordered   Assess for signs and symptoms of hyperglycemia and hypoglycemia   Administer ordered medications to maintain glucose within target range   Assess barriers to adequate nutritional intake and initiate nutrition consult as needed   Instruct patient on self management of diabetes and initiate consult as needed  1/7/2025 2032 by Daniel Mendez, RN  Outcome: Progressing  Flowsheets  Taken 1/7/2025 2032 by Daniel Mendez RN  Glucose maintained within prescribed range:   Monitor blood glucose as ordered   Assess for signs and symptoms of hyperglycemia and hypoglycemia   Administer ordered medications to maintain glucose within target range   Assess barriers to adequate nutritional intake and initiate nutrition consult as needed   Instruct patient on self management of diabetes and initiate consult as needed  Taken 1/7/2025 1930 by Traci Ferreira RN  Glucose maintained within prescribed range:   Assess for signs and symptoms of hyperglycemia and hypoglycemia   Monitor blood glucose as ordered   Administer ordered medications to maintain glucose within target range   Assess barriers to adequate nutritional intake and initiate nutrition consult as needed   Instruct patient on self management of diabetes and initiate consult as needed

## 2025-01-09 NOTE — PROGRESS NOTES
Hospitalist Progress Note    Patient: Evan Huizar MRN: [id number]  CSN: [id number]    YOB: 1970  Age: 54 y.o.  Sex: male    DOA: 1/7/2025 LOS:  LOS: 0 days         Total duration of encounter: 1 day      Chief Complaint ;  Evan Huizar is a 54 y.o.  male w/ PMH of non-adherence to HD, ESRD MWF, HTN, BKA (right), PAD, T2DM and SS trait who presents via EMS after missing his HD since his last discharge from hospital on 1/2/2025.   He was found K 8.3 in ER , urgent HD ordered per nephrologist     Subjective ;  Limited due to confusion   Pt barely arousal when I saw him in er. He received insulin to shift K and he presented hypoglycemia-glucose corrected -he still confused, bp still over 200s  Stat ct head ordered, nicardipine gtt ordered.     Review of systems:  Limited due to mental status change   10 systems reviewed, all negative other than what is mentioned above.      Vital signs/Intake and Output:  Visit Vitals  BP (!) 145/112   Pulse 82   Temp 98.2 °F (36.8 °C) (Oral)   Resp 14   Ht 1.702 m (5' 7\")   Wt 83 kg (182 lb 15.7 oz)   SpO2 98%   BMI 28.66 kg/m²     Current Shift:  No intake/output data recorded.  Last three shifts:  No intake/output data recorded.    Exam:    General: Well developed, open eyes per pain stimuli   Head/Neck: NCAT, supple, No masses, No lymphadenopathy  CVS:Regular rate and rhythm, no M/R/G, S1/S2 heard, no thrill  Lungs:Clear to auscultation bilaterally, no wheezes, rhonchi, or rales  Abdomen: Soft, Nontender, No distention, Normal Bowel sounds, No hepatomegaly  Extremities: No C/C/E, pulses palpable 2+, rt bka   Skin:normal texture and turgor, no rashes, no lesions  Neuro:confused   Psych:appropriate    Labs: Results:       Chemistry Recent Labs     01/07/25  0105 01/07/25  0638   GLUCOSE 62* 45*    137   K 8.3* 6.7*    106   CO2 18* 17*   BUN 99* 111*   CREATININE 15.60* 15.60*   CALCIUM 6.1* 6.5*   BILITOT 0.5  --    AST 39*  --    ALT 58  --  
 met patient at bedside for an initial visit.    Patient said he is in the hospital because he passed out. He said he is okay. Patient thanked  for the visit.     provided presence and support for patient.    Chaplains will provide follow-up care for patient and family as needed.    Spiritual Health History and Assessment/Progress Note  [facility]    Palliative Care,  ,  ,      Name: Evan Huizar MRN: [id number]    Age: 54 y.o.     Sex: male   Language: English   Sikhism: Presybeterian   Hyperkalemia     Date: 1/7/2025            Total Time Calculated: 5 min              Spiritual Assessment began in [facility] 1 INTENSIVE CARE        Referral/Consult From: Palliative Care   Encounter Overview/Reason: Palliative Care  Service Provided For: Patient    Caridad, Belief, Meaning:   Patient identifies as spiritual, is connected with a caridad tradition or spiritual practice, and has beliefs or practices that help with coping during difficult times  Family/Friends No family/friends present      Importance and Influence:  Patient unable to assess at this time  Family/Friends No family/friends present    Community:  Patient is connected with a spiritual community and feels well-supported. Support system includes: Caridad Community and Extended family  Family/Friends No family/friends present    Assessment and Plan of Care:     Patient Interventions include: Facilitated expression of thoughts and feelings, Explored spiritual coping/struggle/distress, and Affirmed coping skills/support systems  Family/Friends Interventions include: No family/friends present    Patient Plan of Care: No spiritual needs identified for follow-up  Family/Friends Plan of Care: No family/friends present    Electronically signed by Chaplain Evelyne on 1/7/2025 at 3:38 PM   
/112, will not start Cardene at this time. Io. Ca. 0.76, to be replaced during HD.   
Care management specialist assisting . This writer sent clinicals/updates to [facility] Fax number [phone]  
Dialysis completed. 4L removed. Patient tolerated well.  
Dr. Thomason provided with update on patient. He remains stable s/p dialysis. Denies chest pain or SOB. VSS. Remains on 3L via NC. Patient has been awake on his phone. Easy to wake when asleep. BS WNL, eating meals. K stable at 3.8. Calcium replacement given. Will recheck Elytes. May transfer to tele if hospitalist agrees.     Report given to SANIA Aviles  
ESRD pt, non compliant with hyperkalmeia  STAT dialysis order and informed on call Hd nurse  Ms Richmond Colindres.  Until dialysis medical treatment for hyperkalemia            
Patient in room 360 dialyzed for 4 hours.  Tolerated tx fair without complications.  Right Subclavian CVC functioning without complication accessing or BFR after reversing lines.   mL/min   mL/min  2500 ml UF removed with a Net UF removal of 98376 ml.  Report given to Jamie Pichardo RN with all questions answered.        Pre Dialysis:  Received report from Primary Nurse Jamie Pichardo RN , pt in bed , a&ox4. No s/s of distress noted on NC @ 2 L/min . O2 Sat 95%. Right Subclavian CVC  assessed no s/s of infection noted. Dressing to same clean, dry and intact and dated 01/07/25.     Intra Dialysis:  Time out/safety process performed per policy, Tx initiated at 22:25.  CVC flowing with ease after reversing catheter lines. For hemodynamic stability UF goal set at 3000  ml as tolerated.    22:45 UF off, BP 92/43, Pulse 84. Pt is stable.    23:15 UF on. UF goal decreased to 2 L. B/P 131/57, Pulse 84.    Pt offered assistance with repositioning every 2 hours. Vascular access visible and line connections remained intact throughout entire duration of treatment. Vital signs checked every 15 mins.     Post Dialysis:  Tx completed at 02:30 Tolerated fair ,1664 mL removed. De-accessed per protocol. RIJ TDC locked with Heparin 1.6 ml in arterial port, and 1.6 ml in venous port, catheter dressing clean, dry and intact. Patient remains in stable condition. Bed in lowest position, wheels locked. Call bell within reach.  
Physical Therapy Goals:  Initiated 1/8/2025 to be met within 7-10 days.  Short Term Goals  Short Term Goal 1: Patient will perform supine to/from sit with independence  Short Term Goal 2: Patient will perform sit to/from stand with Daisy in prep for gait progression  Short Term Goal 3: Patient will ambulate 150 ft with LRAD and Daisy to progress OOB mobility  PHYSICAL THERAPY EVALUATION    Patient: Evan Huizar (54 y.o. male)  Date: 1/8/2025  Diagnosis: Shortness of breath [R06.02]  Hypertensive crisis [I16.9]  Hyperkalemia [E87.5]  Acute pulmonary edema [J81.0]  Dialysis patient, noncompliant ([facility]) [Z91.158]  Acute diastolic congestive heart failure (HCC) [I50.31] Hyperkalemia  Precautions: Fall Risk  PLOF: Modified independent ambulation with rollator    ASSESSMENT :  Patient received supine in bed. Pt presents with decreased LE strength, decreased endurance, impaired balance, and high pain levels limiting functional mobility. Patient performed supine to sit with independence. Patient perform sit to/from stand with supervision. Patient ambulated 40 ft with rollator and stand by assist. Patient appears below baseline level and would benefit from continued skilled PT to progress functional mobility. Consider home health PT at d/c.    DEFICITS/IMPAIRMENTS:   Body Structures, Functions, Activity Limitations Requiring Skilled Therapeutic Intervention: Decreased functional mobility ;Decreased strength;Decreased endurance;Decreased balance;Increased pain    Patient will benefit from skilled intervention to address the above impairments.  Patient's rehabilitation potential/Therapy Prognosis: Good.  Factors which may influence rehabilitation potential include:   []         None noted  []         Mental ability/status  []         Medical condition  []         Home/family situation and support systems  []         Safety awareness  []         Pain tolerance/management  []         Other:      Recommendations and Planned 
Pt d/c in stabled condition.  Pt given copy of discharge after summary papers. Pt instructed to  meds at prescribed pharmacy.  IV removed. Arm bands removed.  Pt wheeled out to car and released to family.  Destination home.     
Pt refused PT due to:  [x]  Preparing for D/c  []  Eating  []  Pain  []  Pt lethargic  []  Off Unit  Will f/u, if D/c is held. Thank you.  Farooq England, PTA    
RENAL CONSULT PROGRESS NOTE   2025    Patient:  Evan Huizar  :  1970  Gender:  male  MRN #:  [id number]    Reason for Consult: Hyperkalemia and uremia     SUBJECTIVE:   Feels okay, BP improving  Breathing improved along with mental status   No other concern     Objective:    BP (!) 156/73   Pulse 86   Temp 98.6 °F (37 °C) (Oral)   Resp 18   Ht 1.702 m (5' 7\")   Wt 79.4 kg (175 lb)   SpO2 92%   BMI 27.41 kg/m²     Physical Exam:    Pt awake,  alert and comfortable    Ext: no edema     Laboratory Data:    Lab Results   Component Value Date    BUN 48 (H) 2025     (H) 2025    BUN 99 (H) 2025     (L) 2025     2025     2025    CO2 23 2025    CO2 17 (L) 2025    CO2 18 (L) 2025    GLUCOSE 95 2025    GLUCOSE 45 (LL) 2025    GLUCOSE 62 (L) 2025     Lab Results   Component Value Date    WBC 8.4 2025    HGB 9.6 (L) 2025    HCT 28.0 (L) 2025     Lab Results   Component Value Date    CALCIUM 7.7 (L) 2025     Lab Results   Component Value Date    HDL 62 (H) 2022     No results found for: \"TURBIDITY\"    Imaging Reveiwed:    CXR-    IMPRESSION:     Unchanged cardiomegaly. Mild pulmonary edema.      Assessment:   Evan Huizar is a 54 y.o. year old male ESRD on HD,  DM-2, anemia, uncontrolled hypertension , hyperphosphatemia,  non compliant to dialysis prescription, dietary/life style modification presented with confusion, shortness of breath, brought by EMS     Found to have significant hyperkalemia, uremic encephalopathy , hypoxia, pulmonary edema and Hypertension all due to missed dialysis     ESRD  Uremic encephalopathy- improved   Hyperkalemia  Pulmonary edema/hypoxia- improved   Uncontrolled Hypertension - improving   Medical non compliance     Plan:    - Patient examined and labs reviewed. Will plan for dialysis today for clearance and volume removal     Sevelamer for 
RENAL CONSULT PROGRESS NOTE   2025    Patient:  Evan Huizar  :  1970  Gender:  male  MRN #:  [id number]    Reason for Consult: Hyperkalemia and uremia     SUBJECTIVE:   Says feels better , Bp stable   Breathing improved   No other concern     Objective:    BP (!) 120/55   Pulse 92   Temp 99 °F (37.2 °C) (Oral)   Resp 18   Ht 1.702 m (5' 7\")   Wt 79.4 kg (175 lb)   SpO2 96%   BMI 27.41 kg/m²     Physical Exam:    Pt awake,  alert and comfortable    Ext: no edema     Laboratory Data:    Lab Results   Component Value Date    BUN 26 (H) 2025    BUN 48 (H) 2025     (H) 2025     (L) 2025     (L) 2025     2025    CO2 27 2025    CO2 23 2025    CO2 17 (L) 2025    GLUCOSE 96 2025    GLUCOSE 95 2025    GLUCOSE 45 (LL) 2025     Lab Results   Component Value Date    WBC 7.0 2025    HGB 9.5 (L) 2025    HCT 27.5 (L) 2025     Lab Results   Component Value Date    CALCIUM 9.0 2025     Lab Results   Component Value Date    HDL 62 (H) 2022     No results found for: \"TURBIDITY\"    Imaging Reveiwed:    CXR-    IMPRESSION:     Unchanged cardiomegaly. Mild pulmonary edema.      Assessment:   Evan Huizar is a 54 y.o. year old male ESRD on HD,  DM-2, anemia, uncontrolled hypertension , hyperphosphatemia,  non compliant to dialysis prescription, dietary/life style modification presented with confusion, shortness of breath, brought by EMS     Found to have significant hyperkalemia, uremic encephalopathy , hypoxia, pulmonary edema and Hypertension all due to missed dialysis     ESRD  Uremic encephalopathy- improved   Hyperkalemia  Pulmonary edema/hypoxia- improved   Uncontrolled Hypertension - improving   Medical non compliance     Plan:    - Patient examined and labs reviewed. No clinical and metabolic indication of dialysis today    Next HD tomorrow   Sevelamer for 
Spiritual Health History and Assessment/Progress Note  [facility]    Spiritual/Emotional Needs, Emotional distress,  ,      Name: Evan Huizar MRN: [id number]    Age: 54 y.o.     Sex: male   Language: English   Alevism: Catholic   Hyperkalemia     Date: 1/8/2025            Total Time Calculated: 30 min              Spiritual Assessment continued in [address] CARDIAC/MEDICAL        Referral/Consult From: Multi-disciplinary team   Encounter Overview/Reason: Spiritual/Emotional Needs  Service Provided For: Patient    Caridad, Belief, Meaning:   Patient identifies as spiritual  Family/Friends No family/friends present      Importance and Influence:  Patient has spiritual/personal beliefs that influence decisions regarding their health  Family/Friends No family/friends present    Community:  Patient is connected with a spiritual community  Family/Friends No family/friends present    Assessment and Plan of Care:     Patient Interventions include: Facilitated expression of thoughts and feelings  Family/Friends Interventions include: No family/friends present    Patient Plan of Care: No spiritual needs identified for follow-up  Family/Friends Plan of Care: No family/friends present    Electronically signed by Chaplain Edgar on 1/8/2025 at 12:14 PM   
TRANSFER - IN REPORT:    Verbal report received from Rubina enciso Evan Huizar  being received from ER for routine progression of patient care      Report consisted of patient's Situation, Background, Assessment and   Recommendations(SBAR).     Information from the following report(s) Nurse Handoff Report, Intake/Output, Recent Results, Cardiac Rhythm SR, and Neuro Assessment was reviewed with the receiving nurse.    Opportunity for questions and clarification was provided.      Assessment completed upon patient's arrival to unit and care assumed.     
Medical History:   Diagnosis Date    Chronic kidney disease     Diabetes (HCC)     Heart failure (HCC)     Hemodialysis patient (HCC)     Hypertension     Psychiatric illness 4/22/2023    Sickle cell trait (HCC)      Past Surgical History:   Procedure Laterality Date    IR NONTUNNELED VASCULAR CATHETER > 5 YEARS  6/1/2022    IR NONTUNNELED VASCULAR CATHETER 6/1/2022 [facility] RAD ANGIO IR    IR NONTUNNELED VASCULAR CATHETER > 5 YEARS  6/1/2022    IR NONTUNNELED VASCULAR CATHETER > 5 YEARS  2/8/2023    IR NONTUNNELED VASCULAR CATHETER 2/8/2023 [facility] RAD ANGIO IR    IR THRMB/INFUSION DIALYSIS CIRCUIT  6/3/2022    IR THRMB/INFUSION DIAYSIS CIRCUIT 6/3/2022 [facility] RAD ANGIO IR    IR THRMB/INFUSION DIALYSIS CIRCUIT  6/3/2022    IR TUNNELED CVC PLACE WO SQ PORT/PUMP > 5 YEARS  6/26/2022    IR TUNNELED CATHETER PLACEMENT GREATER THAN 5 YEARS 6/26/2022 [facility] RAD ANGIO IR    IR TUNNELED CVC PLACE WO SQ PORT/PUMP > 5 YEARS  6/26/2022    IR TUNNELED CVC PLACE WO SQ PORT/PUMP > 5 YEARS  2/7/2023    IR TUNNELED CATHETER PLACEMENT GREATER THAN 5 YEARS 2/7/2023 [facility] RAD ANGIO IR    IR TUNNELED CVC PLACE WO SQ PORT/PUMP > 5 YEARS  7/21/2023    IR TUNNELED CATHETER PLACEMENT GREATER THAN 5 YEARS 7/21/2023 Mora Chowdary MD [facility] RAD ANGIO IR    ORTHOPEDIC SURGERY      right BKA 2017    RECTAL SURGERY N/A 12/31/2024    RECTAL PERIRECTAL INCISION AND DRAINAGE performed by Pavan De La Torre MD at [facility] MAIN OR    XR MIDLINE EQUAL OR GREATER THAN 5 YEARS  9/4/2019    XR MIDLINE EQUAL OR GREATER THAN 5 YEARS 9/4/2019     Barriers to Learning/Limitations: physical  Compensate with: visual, verbal, tactile, kinesthetic cues/model    Home Situation:   Social/Functional History  Lives With: Alone  Type of Home: Apartment  Home Layout: One level  Home Access: Level entry  Bathroom Shower/Tub: Tub/Shower unit  Bathroom Toilet: Standard  Bathroom Equipment: Shower chair, Grab bars around toilet  Home Equipment: Rollator, Wheelchair - Manual (R prosthetic 
communicating results to the patient/family/caregiver  Care coordination and discharge planning with Case Management.        Dear patient Evan Huizar, if you are reviewing this note and have a question regarding the medical terminology, please bring it with you to your next PCP visit.  Medical notes are meant to be a communication between medical professionals.        Electronically signed by: VINH [facility]  01/08/25

## 2025-03-25 NOTE — DISCHARGE SUMMARY
[facility] SUMMARY    Name:  Enmanuel Matthews  MR#:   [id number]  :  1970  ACCOUNT #:  [de-identified]  ADMIT DATE:  2023  DISCHARGE DATE:  2023    DISCHARGE DIAGNOSES:  1. Metabolic encephalopathy, resolved. 2.  Diarrhea, acute-on-chronic. 3.  Hypoglycemia, resolved. 4.  Hypertension, uncontrolled. 5.  End-stage renal disease, on dialysis. 6.  Right below-knee amputation due to peripheral vascular disease. 7.  Medical noncompliance. HOSPITAL SUMMARY:  The patient is 50years old. He was brought from dialysis for diminished responsiveness and diarrheal stools. He does have an ongoing issue with diarrhea at times. He does have a distant history of C. diff. He is diabetic but not on insulin currently, and there is a history of psychiatric illness noted by the nephrologist that prevents him from being fully compliant at times with his ongoing medical care, dialysis and medications. The patient was hemodynamically stable when admitted. He was very somnolent but awoke by the next day. He was dialyzed. Diarrhea complaints from him have been ongoing, but no suspicious stools noted by Nursing. Soft stools at times. Stools with formed substance at times. No watery loose infectious suspicion stools. The patient had a CT scan of his abdomen that showed no bowel obstruction or bowel inflammation. There was diffuse body wall edema. CT head when he came in showed no acute abnormality. He has been on Imodium as needed. We gave an empiric course of Flagyl, and he has had probiotics. White blood cell count is within normal limits. H and H are stable at 10.1 and 30, platelet count is 985,143. Due to some hyperkalemia, he was administered insulin and calcium gluconate with dextrose, but he had a post-treatment hypoglycemic event that made him feel badly and short of breath. He was treated with a dextrose drip for 24 hours and recovered from that.   His last blood
DISPLAY PLAN FREE TEXT

## 2025-04-23 ENCOUNTER — HOSPITAL ENCOUNTER (EMERGENCY)
Facility: HOSPITAL | Age: 55
Discharge: HOME | End: 2025-04-23
Payer: MEDICARE

## 2025-04-23 ENCOUNTER — APPOINTMENT (OUTPATIENT)
Facility: HOSPITAL | Age: 55
End: 2025-04-23
Payer: MEDICARE

## 2025-04-23 VITALS
DIASTOLIC BLOOD PRESSURE: 77 MMHG | HEART RATE: 85 BPM | RESPIRATION RATE: 18 BRPM | TEMPERATURE: 98 F | OXYGEN SATURATION: 98 % | WEIGHT: 163.14 LBS | SYSTOLIC BLOOD PRESSURE: 125 MMHG | BODY MASS INDEX: 23.36 KG/M2 | HEIGHT: 70 IN

## 2025-04-23 DIAGNOSIS — J81.1 CHRONIC PULMONARY EDEMA: ICD-10-CM

## 2025-04-23 DIAGNOSIS — Z92.89 HISTORY OF HEMODIALYSIS: ICD-10-CM

## 2025-04-23 DIAGNOSIS — M54.50 ACUTE RIGHT-SIDED LOW BACK PAIN WITHOUT SCIATICA: Primary | ICD-10-CM

## 2025-04-23 DIAGNOSIS — Z91.199 NONCOMPLIANCE: ICD-10-CM

## 2025-04-23 LAB
ALBUMIN SERPL-MCNC: 2.7 G/DL (ref 3.4–5)
ALBUMIN/GLOB SERPL: 0.6 (ref 0.8–1.7)
ALP SERPL-CCNC: 69 U/L (ref 45–117)
ALT SERPL-CCNC: 7 U/L (ref 16–61)
ANION GAP SERPL CALC-SCNC: 11 MMOL/L (ref 3–18)
AST SERPL-CCNC: 15 U/L (ref 10–38)
BASOPHILS # BLD: 0.04 K/UL (ref 0–0.1)
BASOPHILS NFR BLD: 0.5 % (ref 0–2)
BILIRUB SERPL-MCNC: 0.5 MG/DL (ref 0.2–1)
BUN SERPL-MCNC: 83 MG/DL (ref 7–18)
BUN/CREAT SERPL: 6 (ref 12–20)
CALCIUM SERPL-MCNC: 8.9 MG/DL (ref 8.5–10.1)
CHLORIDE SERPL-SCNC: 102 MMOL/L (ref 100–111)
CO2 SERPL-SCNC: 22 MMOL/L (ref 21–32)
CREAT SERPL-MCNC: 14.9 MG/DL (ref 0.6–1.3)
DIFFERENTIAL METHOD BLD: ABNORMAL
EOSINOPHIL # BLD: 0.17 K/UL (ref 0–0.4)
EOSINOPHIL NFR BLD: 2.1 % (ref 0–5)
ERYTHROCYTE [DISTWIDTH] IN BLOOD BY AUTOMATED COUNT: 19.7 % (ref 11.6–14.5)
GLOBULIN SER CALC-MCNC: 4.6 G/DL (ref 2–4)
GLUCOSE SERPL-MCNC: 73 MG/DL (ref 74–99)
HCT VFR BLD AUTO: 30.2 % (ref 36–48)
HGB BLD-MCNC: 9.7 G/DL (ref 13–16)
IMM GRANULOCYTES # BLD AUTO: 0.04 K/UL (ref 0–0.04)
IMM GRANULOCYTES NFR BLD AUTO: 0.5 % (ref 0–0.5)
LIPASE SERPL-CCNC: 15 U/L (ref 13–75)
LYMPHOCYTES # BLD: 0.37 K/UL (ref 0.9–3.3)
LYMPHOCYTES NFR BLD: 4.6 % (ref 21–52)
MCH RBC QN AUTO: 24.3 PG (ref 24–34)
MCHC RBC AUTO-ENTMCNC: 32.1 G/DL (ref 31–37)
MCV RBC AUTO: 75.5 FL (ref 78–100)
MONOCYTES # BLD: 1.07 K/UL (ref 0.05–1.2)
MONOCYTES NFR BLD: 13.2 % (ref 3–10)
NEUTS SEG # BLD: 6.43 K/UL (ref 1.8–8)
NEUTS SEG NFR BLD: 79.1 % (ref 40–73)
NRBC # BLD: 0 K/UL (ref 0–0.01)
NRBC BLD-RTO: 0 PER 100 WBC
PLATELET # BLD AUTO: 222 K/UL (ref 135–420)
PMV BLD AUTO: 9.6 FL (ref 9.2–11.8)
POTASSIUM SERPL-SCNC: 4.4 MMOL/L (ref 3.5–5.5)
PROT SERPL-MCNC: 7.3 G/DL (ref 6.4–8.2)
RBC # BLD AUTO: 4 M/UL (ref 4.35–5.65)
SODIUM SERPL-SCNC: 135 MMOL/L (ref 136–145)
WBC # BLD AUTO: 8.1 K/UL (ref 4.6–13.2)

## 2025-04-23 PROCEDURE — 6370000000 HC RX 637 (ALT 250 FOR IP): Performed by: PHYSICIAN ASSISTANT

## 2025-04-23 PROCEDURE — 72100 X-RAY EXAM L-S SPINE 2/3 VWS: CPT

## 2025-04-23 PROCEDURE — 85025 COMPLETE CBC W/AUTO DIFF WBC: CPT

## 2025-04-23 PROCEDURE — 83690 ASSAY OF LIPASE: CPT

## 2025-04-23 PROCEDURE — 71045 X-RAY EXAM CHEST 1 VIEW: CPT

## 2025-04-23 PROCEDURE — 99284 EMERGENCY DEPT VISIT MOD MDM: CPT

## 2025-04-23 PROCEDURE — 80053 COMPREHEN METABOLIC PANEL: CPT

## 2025-04-23 RX ORDER — CYCLOBENZAPRINE HCL 10 MG
10 TABLET ORAL
Status: COMPLETED | OUTPATIENT
Start: 2025-04-23 | End: 2025-04-23

## 2025-04-23 RX ORDER — OXYCODONE AND ACETAMINOPHEN 5; 325 MG/1; MG/1
1 TABLET ORAL
Refills: 0 | Status: COMPLETED | OUTPATIENT
Start: 2025-04-23 | End: 2025-04-23

## 2025-04-23 RX ORDER — LIDOCAINE 50 MG/G
1 PATCH TOPICAL DAILY
Qty: 10 PATCH | Refills: 0 | Status: SHIPPED | OUTPATIENT
Start: 2025-04-23 | End: 2025-05-03

## 2025-04-23 RX ORDER — LIDOCAINE 4 G/G
1 PATCH TOPICAL DAILY
Status: DISCONTINUED | OUTPATIENT
Start: 2025-04-23 | End: 2025-04-23 | Stop reason: HOSPADM

## 2025-04-23 RX ADMIN — OXYCODONE AND ACETAMINOPHEN 1 TABLET: 325; 5 TABLET ORAL at 12:45

## 2025-04-23 RX ADMIN — CYCLOBENZAPRINE HYDROCHLORIDE 10 MG: 10 TABLET, FILM COATED ORAL at 12:45

## 2025-04-23 ASSESSMENT — PAIN DESCRIPTION - FREQUENCY: FREQUENCY: INTERMITTENT

## 2025-04-23 ASSESSMENT — PAIN - FUNCTIONAL ASSESSMENT: PAIN_FUNCTIONAL_ASSESSMENT: 0-10

## 2025-04-23 ASSESSMENT — PAIN DESCRIPTION - ORIENTATION: ORIENTATION: LOWER

## 2025-04-23 ASSESSMENT — PAIN DESCRIPTION - DESCRIPTORS: DESCRIPTORS: ACHING

## 2025-04-23 ASSESSMENT — PAIN DESCRIPTION - LOCATION: LOCATION: BACK

## 2025-04-23 ASSESSMENT — PAIN SCALES - GENERAL: PAINLEVEL_OUTOF10: 10

## 2025-04-23 ASSESSMENT — PAIN DESCRIPTION - PAIN TYPE: TYPE: ACUTE PAIN

## 2025-04-23 NOTE — ED PROVIDER NOTES
trait        Past Surgical History:  Past Surgical History:   Procedure Laterality Date    IR NONTUNNELED VASCULAR CATHETER > 5 YEARS  6/1/2022    IR NONTUNNELED VASCULAR CATHETER 6/1/2022 [facility] RAD ANGIO IR    IR NONTUNNELED VASCULAR CATHETER > 5 YEARS  6/1/2022    IR NONTUNNELED VASCULAR CATHETER > 5 YEARS  2/8/2023    IR NONTUNNELED VASCULAR CATHETER 2/8/2023 [facility] RAD ANGIO IR    IR THRMB/INFUSION DIALYSIS CIRCUIT  6/3/2022    IR THRMB/INFUSION DIAYSIS CIRCUIT 6/3/2022 [facility] RAD ANGIO IR    IR THRMB/INFUSION DIALYSIS CIRCUIT  6/3/2022    IR TUNNELED CVC PLACE WO SQ PORT/PUMP > 5 YEARS  6/26/2022    IR TUNNELED CATHETER PLACEMENT GREATER THAN 5 YEARS 6/26/2022 [facility] RAD ANGIO IR    IR TUNNELED CVC PLACE WO SQ PORT/PUMP > 5 YEARS  6/26/2022    IR TUNNELED CVC PLACE WO SQ PORT/PUMP > 5 YEARS  2/7/2023    IR TUNNELED CATHETER PLACEMENT GREATER THAN 5 YEARS 2/7/2023 [facility] RAD ANGIO IR    IR TUNNELED CVC PLACE WO SQ PORT/PUMP > 5 YEARS  7/21/2023    IR TUNNELED CATHETER PLACEMENT GREATER THAN 5 YEARS 7/21/2023 Mora Chowdary MD [facility] RAD ANGIO IR    ORTHOPEDIC SURGERY      right BKA 2017    RECTAL SURGERY N/A 12/31/2024    RECTAL PERIRECTAL INCISION AND DRAINAGE performed by Pavan De La Torre MD at [facility] MAIN OR    XR MIDLINE EQUAL OR GREATER THAN 5 YEARS  9/4/2019    XR MIDLINE EQUAL OR GREATER THAN 5 YEARS 9/4/2019       Family History:  Family History   Problem Relation Age of Onset    Sickle Cell Anemia Mother     Diabetes Father        Social History:  Social History     Tobacco Use    Smoking status: Former    Smokeless tobacco: Never   Substance Use Topics    Alcohol use: No    Drug use: No       Allergies:  Allergies   Allergen Reactions    Vancomycin Other (See Comments)     Generalized desquamation - body-wide; not Red-Man    Penicillins Rash       Vital Signs:  Vitals:    04/23/25 1224 04/23/25 1400   BP: 131/76 125/77   Pulse: 83 85   Resp: 16 18   Temp: 98 °F (36.7 °C)    TempSrc: Oral    SpO2: 97% 98%

## 2025-04-23 NOTE — DISCHARGE INSTRUCTIONS
Your back pain appears to be muscular.  You can use ice to the area, 20 minutes hourly, diclofenac topical, Lidoderm patch.  Follow-up with your doctor regarding further evaluation and treatment    You have missed your dialysis today.  Contact your dialysis unit to see if you can have a session tomorrow.  Blood work does not show any concerning findings for emergent dialysis but you do have fluid in your lung.  Return if any new or worsening symptoms or new concerns

## 2025-04-23 NOTE — ED TRIAGE NOTES
Patient reports his lower back states it has been hurting for two weeks. C/o right arm pain denies injury. Patient states he wears o2 at [place] 2 LPM states his back hurt to much to bring it. Requested oxygen. RN administered oxygen 2 LPM via NC as requested.

## 2025-04-30 ENCOUNTER — HOSPITAL ENCOUNTER (INPATIENT)
Facility: HOSPITAL | Age: 55
LOS: 2 days | Discharge: HOME | End: 2025-05-02
Attending: STUDENT IN AN ORGANIZED HEALTH CARE EDUCATION/TRAINING PROGRAM | Admitting: HOSPITALIST
Payer: MEDICARE

## 2025-04-30 DIAGNOSIS — E16.2 HYPOGLYCEMIA: ICD-10-CM

## 2025-04-30 DIAGNOSIS — E87.20 METABOLIC ACIDOSIS: Primary | ICD-10-CM

## 2025-04-30 PROBLEM — I10 ACCELERATED HYPERTENSION: Status: ACTIVE | Noted: 2025-04-30

## 2025-04-30 LAB
ALBUMIN SERPL-MCNC: 2.8 G/DL (ref 3.4–5)
ALBUMIN/GLOB SERPL: 0.5 (ref 0.8–1.7)
ALP SERPL-CCNC: 77 U/L (ref 45–117)
ALT SERPL-CCNC: 6 U/L (ref 10–50)
ANION GAP SERPL CALC-SCNC: 30 MMOL/L (ref 3–18)
AST SERPL-CCNC: 9 U/L (ref 10–38)
BASE DEFICIT BLDV-SCNC: 0.9 MMOL/L
BASOPHILS # BLD: 0.03 K/UL (ref 0–0.1)
BASOPHILS NFR BLD: 0.4 % (ref 0–2)
BILIRUB SERPL-MCNC: 0.4 MG/DL (ref 0.2–1)
BUN SERPL-MCNC: 152 MG/DL (ref 6–23)
BUN/CREAT SERPL: 7 (ref 12–20)
CALCIUM SERPL-MCNC: 8.1 MG/DL (ref 8.5–10.1)
CHLORIDE SERPL-SCNC: 98 MMOL/L (ref 98–107)
CO2 SERPL-SCNC: 11 MMOL/L (ref 21–32)
CREAT SERPL-MCNC: 22.6 MG/DL (ref 0.6–1.3)
DIFFERENTIAL METHOD BLD: ABNORMAL
EOSINOPHIL # BLD: 0.06 K/UL (ref 0–0.4)
EOSINOPHIL NFR BLD: 0.8 % (ref 0–5)
ERYTHROCYTE [DISTWIDTH] IN BLOOD BY AUTOMATED COUNT: 20.2 % (ref 11.6–14.5)
GLOBULIN SER CALC-MCNC: 5.3 G/DL (ref 2–4)
GLUCOSE BLD STRIP.AUTO-MCNC: 108 MG/DL (ref 70–110)
GLUCOSE BLD STRIP.AUTO-MCNC: 165 MG/DL (ref 70–110)
GLUCOSE BLD STRIP.AUTO-MCNC: 52 MG/DL (ref 70–110)
GLUCOSE BLD STRIP.AUTO-MCNC: 60 MG/DL (ref 70–110)
GLUCOSE BLD STRIP.AUTO-MCNC: 74 MG/DL (ref 70–110)
GLUCOSE BLD STRIP.AUTO-MCNC: 87 MG/DL (ref 70–110)
GLUCOSE SERPL-MCNC: 45 MG/DL (ref 74–108)
HCO3 BLDV-SCNC: 22.3 MMOL/L (ref 23–28)
HCT VFR BLD AUTO: 31 % (ref 36–48)
HGB BLD-MCNC: 10.2 G/DL (ref 13–16)
IMM GRANULOCYTES # BLD AUTO: 0.04 K/UL (ref 0–0.04)
IMM GRANULOCYTES NFR BLD AUTO: 0.6 % (ref 0–0.5)
LYMPHOCYTES # BLD: 0.31 K/UL (ref 0.9–3.3)
LYMPHOCYTES NFR BLD: 4.4 % (ref 21–52)
MCH RBC QN AUTO: 23.9 PG (ref 24–34)
MCHC RBC AUTO-ENTMCNC: 32.9 G/DL (ref 31–37)
MCV RBC AUTO: 72.6 FL (ref 78–100)
MONOCYTES # BLD: 0.3 K/UL (ref 0.05–1.2)
MONOCYTES NFR BLD: 4.2 % (ref 3–10)
NEUTS SEG # BLD: 6.36 K/UL (ref 1.8–8)
NEUTS SEG NFR BLD: 89.6 % (ref 40–73)
NRBC # BLD: 0 K/UL (ref 0–0.01)
NRBC BLD-RTO: 0 PER 100 WBC
PCO2 BLDV: 31.8 MMHG (ref 41–51)
PH BLDV: 7.46 (ref 7.32–7.42)
PLATELET # BLD AUTO: 229 K/UL (ref 135–420)
PO2 BLDV: 37 MMHG (ref 25–40)
POTASSIUM SERPL-SCNC: 5.4 MMOL/L (ref 3.5–5.5)
PROT SERPL-MCNC: 8.1 G/DL (ref 6.4–8.2)
RBC # BLD AUTO: 4.27 M/UL (ref 4.35–5.65)
RBC MORPH BLD: ABNORMAL
SAO2 % BLDV: 74.8 % (ref 65–88)
SERVICE CMNT-IMP: ABNORMAL
SODIUM SERPL-SCNC: 139 MMOL/L (ref 136–145)
SPECIMEN TYPE: ABNORMAL
WBC # BLD AUTO: 7.1 K/UL (ref 4.6–13.2)

## 2025-04-30 PROCEDURE — 85025 COMPLETE CBC W/AUTO DIFF WBC: CPT

## 2025-04-30 PROCEDURE — 90935 HEMODIALYSIS ONE EVALUATION: CPT

## 2025-04-30 PROCEDURE — 99285 EMERGENCY DEPT VISIT HI MDM: CPT

## 2025-04-30 PROCEDURE — 6370000000 HC RX 637 (ALT 250 FOR IP): Performed by: HOSPITALIST

## 2025-04-30 PROCEDURE — 2500000003 HC RX 250 WO HCPCS: Performed by: HOSPITALIST

## 2025-04-30 PROCEDURE — 80053 COMPREHEN METABOLIC PANEL: CPT

## 2025-04-30 PROCEDURE — 82962 GLUCOSE BLOOD TEST: CPT

## 2025-04-30 PROCEDURE — 2580000003 HC RX 258: Performed by: HOSPITALIST

## 2025-04-30 PROCEDURE — 6360000002 HC RX W HCPCS: Performed by: HOSPITALIST

## 2025-04-30 PROCEDURE — 93005 ELECTROCARDIOGRAM TRACING: CPT | Performed by: STUDENT IN AN ORGANIZED HEALTH CARE EDUCATION/TRAINING PROGRAM

## 2025-04-30 PROCEDURE — 82803 BLOOD GASES ANY COMBINATION: CPT

## 2025-04-30 PROCEDURE — 5A1D70Z PERFORMANCE OF URINARY FILTRATION, INTERMITTENT, LESS THAN 6 HOURS PER DAY: ICD-10-PCS | Performed by: INTERNAL MEDICINE

## 2025-04-30 PROCEDURE — 1100000003 HC PRIVATE W/ TELEMETRY

## 2025-04-30 RX ORDER — SENNOSIDES A AND B 8.6 MG/1
1 TABLET, FILM COATED ORAL DAILY PRN
Status: DISCONTINUED | OUTPATIENT
Start: 2025-04-30 | End: 2025-05-02 | Stop reason: HOSPADM

## 2025-04-30 RX ORDER — AMLODIPINE BESYLATE 5 MG/1
10 TABLET ORAL DAILY
Status: DISCONTINUED | OUTPATIENT
Start: 2025-05-01 | End: 2025-05-02 | Stop reason: HOSPADM

## 2025-04-30 RX ORDER — DEXTROSE MONOHYDRATE 50 MG/ML
INJECTION, SOLUTION INTRAVENOUS CONTINUOUS
Status: DISPENSED | OUTPATIENT
Start: 2025-05-01 | End: 2025-05-01

## 2025-04-30 RX ORDER — DEXTROSE MONOHYDRATE 50 MG/ML
INJECTION, SOLUTION INTRAVENOUS CONTINUOUS
Status: DISCONTINUED | OUTPATIENT
Start: 2025-04-30 | End: 2025-04-30

## 2025-04-30 RX ORDER — SODIUM CHLORIDE 0.9 % (FLUSH) 0.9 %
5-40 SYRINGE (ML) INJECTION EVERY 12 HOURS SCHEDULED
Status: DISCONTINUED | OUTPATIENT
Start: 2025-04-30 | End: 2025-05-02 | Stop reason: HOSPADM

## 2025-04-30 RX ORDER — DEXTROSE MONOHYDRATE 100 MG/ML
INJECTION, SOLUTION INTRAVENOUS CONTINUOUS PRN
Status: DISCONTINUED | OUTPATIENT
Start: 2025-04-30 | End: 2025-05-01 | Stop reason: SDUPTHER

## 2025-04-30 RX ORDER — ACETAMINOPHEN 650 MG/1
650 SUPPOSITORY RECTAL EVERY 6 HOURS PRN
Status: DISCONTINUED | OUTPATIENT
Start: 2025-04-30 | End: 2025-05-02 | Stop reason: HOSPADM

## 2025-04-30 RX ORDER — SODIUM CHLORIDE 9 MG/ML
INJECTION, SOLUTION INTRAVENOUS PRN
Status: DISCONTINUED | OUTPATIENT
Start: 2025-04-30 | End: 2025-05-02 | Stop reason: HOSPADM

## 2025-04-30 RX ORDER — GLUCAGON 1 MG/ML
1 KIT INJECTION PRN
Status: DISCONTINUED | OUTPATIENT
Start: 2025-04-30 | End: 2025-04-30 | Stop reason: SDUPTHER

## 2025-04-30 RX ORDER — ACETAMINOPHEN 325 MG/1
650 TABLET ORAL EVERY 6 HOURS PRN
Status: DISCONTINUED | OUTPATIENT
Start: 2025-04-30 | End: 2025-05-02 | Stop reason: HOSPADM

## 2025-04-30 RX ORDER — DEXTROSE MONOHYDRATE 100 MG/ML
INJECTION, SOLUTION INTRAVENOUS CONTINUOUS PRN
Status: DISCONTINUED | OUTPATIENT
Start: 2025-04-30 | End: 2025-05-02 | Stop reason: HOSPADM

## 2025-04-30 RX ORDER — SODIUM CHLORIDE 0.9 % (FLUSH) 0.9 %
5-40 SYRINGE (ML) INJECTION PRN
Status: DISCONTINUED | OUTPATIENT
Start: 2025-04-30 | End: 2025-05-02 | Stop reason: HOSPADM

## 2025-04-30 RX ORDER — ONDANSETRON 4 MG/1
4 TABLET, ORALLY DISINTEGRATING ORAL EVERY 8 HOURS PRN
Status: DISCONTINUED | OUTPATIENT
Start: 2025-04-30 | End: 2025-05-02 | Stop reason: HOSPADM

## 2025-04-30 RX ORDER — GLUCAGON 1 MG/ML
1 KIT INJECTION PRN
Status: DISCONTINUED | OUTPATIENT
Start: 2025-04-30 | End: 2025-05-02 | Stop reason: HOSPADM

## 2025-04-30 RX ORDER — HEPARIN SODIUM 5000 [USP'U]/ML
5000 INJECTION, SOLUTION INTRAVENOUS; SUBCUTANEOUS EVERY 8 HOURS SCHEDULED
Status: DISCONTINUED | OUTPATIENT
Start: 2025-04-30 | End: 2025-05-02 | Stop reason: HOSPADM

## 2025-04-30 RX ORDER — INSULIN LISPRO 100 [IU]/ML
0-4 INJECTION, SOLUTION INTRAVENOUS; SUBCUTANEOUS
Status: DISCONTINUED | OUTPATIENT
Start: 2025-04-30 | End: 2025-05-02 | Stop reason: HOSPADM

## 2025-04-30 RX ORDER — ONDANSETRON 2 MG/ML
4 INJECTION INTRAMUSCULAR; INTRAVENOUS EVERY 6 HOURS PRN
Status: DISCONTINUED | OUTPATIENT
Start: 2025-04-30 | End: 2025-05-02 | Stop reason: HOSPADM

## 2025-04-30 RX ADMIN — SODIUM CHLORIDE, PRESERVATIVE FREE 10 ML: 5 INJECTION INTRAVENOUS at 23:50

## 2025-04-30 RX ADMIN — HEPARIN SODIUM 5000 UNITS: 5000 INJECTION INTRAVENOUS; SUBCUTANEOUS at 23:40

## 2025-04-30 RX ADMIN — AMLODIPINE BESYLATE 10 MG: 5 TABLET ORAL at 23:52

## 2025-04-30 RX ADMIN — DEXTROSE: 5 SOLUTION INTRAVENOUS at 23:44

## 2025-04-30 ASSESSMENT — PAIN - FUNCTIONAL ASSESSMENT: PAIN_FUNCTIONAL_ASSESSMENT: NONE - DENIES PAIN

## 2025-04-30 ASSESSMENT — PAIN SCALES - GENERAL: PAINLEVEL_OUTOF10: 0

## 2025-04-30 NOTE — ED PROVIDER NOTES
EMERGENCY DEPARTMENT HISTORY AND PHYSICAL EXAM    3:54 PM      Date: 4/30/2025  Patient Name: Evan Huizar    History of Presenting Illness     Chief Complaint   Patient presents with    Hypoglycemia       History From: Patient and EMS    Evan Huizar is a 54 y.o. male   HPI  54-year-old male with history of ESRD, hypertension, anemia, CHF, prolonged QTc, sickle cell trait who presents with hypoglycemia.  Per EMS patient was found to be weak and hypoglycemic with sugars in the 30s.  Patient was given a amp of D50.  Patient hypoglycemia mildly improved.  Patient said that he got a tunneled dialysis catheter placed on the right side of his chest 2 days ago.  Patient has not in the past 2 days.  He denies any other changes in meds, sick contacts, or any other changes.  When assessing ROS he has no nausea, vomiting, abdominal pain, chest pain, changes in bowel movements, or any other changes.      Nursing Notes were all reviewed and agreed with or any disagreements were addressed in the HPI.    PCP: Deondre Sheets MD    Current Facility-Administered Medications   Medication Dose Route Frequency Provider Last Rate Last Admin    calcitRIOL (ROCALTROL) capsule 0.5 mcg  0.5 mcg Oral Daily Nakia Roy, DO   0.5 mcg at 05/01/25 0845    labetalol (NORMODYNE) tablet 200 mg  200 mg Oral 2 times per day Nakia Roy DO        sevelamer (RENVELA) tablet 800 mg  800 mg Oral TID WC Nakia Roy DO   800 mg at 05/01/25 1155    hydrALAZINE (APRESOLINE) tablet 100 mg  100 mg Oral 3 times per day Nakia Roy,         sodium chloride flush 0.9 % injection 5-40 mL  5-40 mL IntraVENous 2 times per day Nakia Roy DO   10 mL at 05/01/25 0846    sodium chloride flush 0.9 % injection 5-40 mL  5-40 mL IntraVENous PRN Nakia Roy DO        0.9 % sodium chloride infusion   IntraVENous PRN Nakia Roy DO        heparin (porcine) injection 5,000 Units  5,000 Units SubCUTAneous 3 times per day Nakia Roy DO    Collection Time: 05/01/25  4:31 AM   Result Value Ref Range    Hemoglobin A1C 4.7 4.2 - 5.6 %    Estimated Avg Glucose 88 mg/dL   Renal Function Panel    Collection Time: 05/01/25  4:31 AM   Result Value Ref Range    Sodium 137 136 - 145 mmol/L    Potassium 3.6 3.5 - 5.5 mmol/L    Chloride 100 98 - 107 mmol/L    CO2 19 (L) 21 - 32 mmol/L    Anion Gap 18 3 - 18 mmol/L    Glucose 87 74 - 108 mg/dL    BUN 58 (H) 6 - 23 MG/DL    Creatinine 11.20 (H) 0.60 - 1.30 MG/DL    BUN/Creatinine Ratio 5 (L) 12 - 20      Est, Glom Filt Rate 5 (L) >60 ml/min/1.73m2    Calcium 8.1 (L) 8.5 - 10.1 MG/DL    Phosphorus 6.2 (H) 2.5 - 4.9 MG/DL    Albumin 2.3 (L) 3.4 - 5.0 g/dL   Magnesium    Collection Time: 05/01/25  4:31 AM   Result Value Ref Range    Magnesium 2.6 1.6 - 2.6 mg/dL   CBC    Collection Time: 05/01/25  4:31 AM   Result Value Ref Range    WBC 5.9 4.6 - 13.2 K/uL    RBC 4.17 (L) 4.35 - 5.65 M/uL    Hemoglobin 10.1 (L) 13.0 - 16.0 g/dL    Hematocrit 30.0 (L) 36.0 - 48.0 %    MCV 71.9 (L) 78.0 - 100.0 FL    MCH 24.2 24.0 - 34.0 PG    MCHC 33.7 31.0 - 37.0 g/dL    RDW 19.7 (H) 11.6 - 14.5 %    Platelets 212 135 - 420 K/uL    Nucleated RBCs 0.0 0  WBC    nRBC 0.00 0.00 - 0.01 K/uL   POCT Glucose    Collection Time: 05/01/25  6:38 AM   Result Value Ref Range    POC Glucose 91 70 - 110 mg/dL   POCT Glucose    Collection Time: 05/01/25 11:23 AM   Result Value Ref Range    POC Glucose 108 70 - 110 mg/dL   POCT Glucose    Collection Time: 05/01/25  3:36 PM   Result Value Ref Range    POC Glucose 139 (H) 70 - 110 mg/dL       Radiologic Studies -   No orders to display         Medical Decision Making   I am the first provider for this patient.    I reviewed the vital signs, available nursing notes, past medical history, past surgical history, family history and social history.    Vital Signs-Reviewed the patient's vital signs.      EKG: ed course       ED Course: Progress Notes, Reevaluation, and Consults:    Provider Notes

## 2025-04-30 NOTE — DIALYSIS
Treatment summary  Received report from Yaya BAIN Rn    Received pt. In ER bed 5 awake a/o x4, VSS. R chest TDC functioning  Well access without complication, treatment initiated.     Dialyzed patient in ED bed 5  for 3.5 hours.     Total Uf 3000  ml  Net UF 2500 ml     Treatment note:           Patient tolerate treatment well remain in stable condition.    Offered assistance with repositioning every 2 hours.      Vascular access visible at all times and line connected at all   times during treatment. Access  Functioning well without  complications.     Report given to Yaya BAIN RN with all questions answered.

## 2025-04-30 NOTE — ED TRIAGE NOTES
Patient arrived to ED from home via EMS c/o weakness, tired, and low sugar levels. EMS gave 50mL of D10 and 30mg of oral glucose.   Patient states he hasn't eaten anything in 2 days due to new port access for dialysis and wasn't told he could eat again after the procedure was done.

## 2025-05-01 LAB
ALBUMIN SERPL-MCNC: 2.3 G/DL (ref 3.4–5)
ANION GAP SERPL CALC-SCNC: 18 MMOL/L (ref 3–18)
BUN SERPL-MCNC: 58 MG/DL (ref 6–23)
BUN/CREAT SERPL: 5 (ref 12–20)
CALCIUM SERPL-MCNC: 8.1 MG/DL (ref 8.5–10.1)
CHLORIDE SERPL-SCNC: 100 MMOL/L (ref 98–107)
CO2 SERPL-SCNC: 19 MMOL/L (ref 21–32)
CREAT SERPL-MCNC: 11.2 MG/DL (ref 0.6–1.3)
ERYTHROCYTE [DISTWIDTH] IN BLOOD BY AUTOMATED COUNT: 19.7 % (ref 11.6–14.5)
EST. AVERAGE GLUCOSE BLD GHB EST-MCNC: 88 MG/DL
GLUCOSE BLD STRIP.AUTO-MCNC: 108 MG/DL (ref 70–110)
GLUCOSE BLD STRIP.AUTO-MCNC: 113 MG/DL (ref 70–110)
GLUCOSE BLD STRIP.AUTO-MCNC: 139 MG/DL (ref 70–110)
GLUCOSE BLD STRIP.AUTO-MCNC: 91 MG/DL (ref 70–110)
GLUCOSE SERPL-MCNC: 87 MG/DL (ref 74–108)
HBA1C MFR BLD: 4.7 % (ref 4.2–5.6)
HCT VFR BLD AUTO: 30 % (ref 36–48)
HGB BLD-MCNC: 10.1 G/DL (ref 13–16)
MAGNESIUM SERPL-MCNC: 2.6 MG/DL (ref 1.6–2.6)
MCH RBC QN AUTO: 24.2 PG (ref 24–34)
MCHC RBC AUTO-ENTMCNC: 33.7 G/DL (ref 31–37)
MCV RBC AUTO: 71.9 FL (ref 78–100)
NRBC # BLD: 0 K/UL (ref 0–0.01)
NRBC BLD-RTO: 0 PER 100 WBC
PHOSPHATE SERPL-MCNC: 6.2 MG/DL (ref 2.5–4.9)
PLATELET # BLD AUTO: 212 K/UL (ref 135–420)
POTASSIUM SERPL-SCNC: 3.6 MMOL/L (ref 3.5–5.5)
RBC # BLD AUTO: 4.17 M/UL (ref 4.35–5.65)
SODIUM SERPL-SCNC: 137 MMOL/L (ref 136–145)
WBC # BLD AUTO: 5.9 K/UL (ref 4.6–13.2)

## 2025-05-01 PROCEDURE — 85027 COMPLETE CBC AUTOMATED: CPT

## 2025-05-01 PROCEDURE — 83735 ASSAY OF MAGNESIUM: CPT

## 2025-05-01 PROCEDURE — 80069 RENAL FUNCTION PANEL: CPT

## 2025-05-01 PROCEDURE — 83036 HEMOGLOBIN GLYCOSYLATED A1C: CPT

## 2025-05-01 PROCEDURE — 36415 COLL VENOUS BLD VENIPUNCTURE: CPT

## 2025-05-01 PROCEDURE — 82962 GLUCOSE BLOOD TEST: CPT

## 2025-05-01 PROCEDURE — 2500000003 HC RX 250 WO HCPCS: Performed by: HOSPITALIST

## 2025-05-01 PROCEDURE — 6370000000 HC RX 637 (ALT 250 FOR IP): Performed by: HOSPITALIST

## 2025-05-01 PROCEDURE — 6360000002 HC RX W HCPCS: Performed by: HOSPITALIST

## 2025-05-01 PROCEDURE — 90935 HEMODIALYSIS ONE EVALUATION: CPT

## 2025-05-01 PROCEDURE — 1100000003 HC PRIVATE W/ TELEMETRY

## 2025-05-01 RX ORDER — CALCITRIOL 0.25 UG/1
0.5 CAPSULE, LIQUID FILLED ORAL DAILY
Status: DISCONTINUED | OUTPATIENT
Start: 2025-05-01 | End: 2025-05-02 | Stop reason: HOSPADM

## 2025-05-01 RX ORDER — HEPARIN SODIUM 1000 [USP'U]/ML
3200 INJECTION, SOLUTION INTRAVENOUS; SUBCUTANEOUS PRN
Status: DISCONTINUED | OUTPATIENT
Start: 2025-05-01 | End: 2025-05-02 | Stop reason: HOSPADM

## 2025-05-01 RX ORDER — HYDRALAZINE HYDROCHLORIDE 50 MG/1
100 TABLET, FILM COATED ORAL EVERY 8 HOURS SCHEDULED
Status: DISCONTINUED | OUTPATIENT
Start: 2025-05-01 | End: 2025-05-02 | Stop reason: HOSPADM

## 2025-05-01 RX ORDER — SEVELAMER CARBONATE 800 MG/1
800 TABLET, FILM COATED ORAL
Status: DISCONTINUED | OUTPATIENT
Start: 2025-05-01 | End: 2025-05-02 | Stop reason: HOSPADM

## 2025-05-01 RX ORDER — LABETALOL 200 MG/1
200 TABLET, FILM COATED ORAL EVERY 12 HOURS SCHEDULED
Status: DISCONTINUED | OUTPATIENT
Start: 2025-05-01 | End: 2025-05-02

## 2025-05-01 RX ADMIN — CALCITRIOL 0.5 MCG: 0.25 CAPSULE ORAL at 08:45

## 2025-05-01 RX ADMIN — SODIUM CHLORIDE, PRESERVATIVE FREE 10 ML: 5 INJECTION INTRAVENOUS at 08:46

## 2025-05-01 RX ADMIN — HEPARIN SODIUM 5000 UNITS: 5000 INJECTION INTRAVENOUS; SUBCUTANEOUS at 14:41

## 2025-05-01 RX ADMIN — HEPARIN SODIUM 5000 UNITS: 5000 INJECTION INTRAVENOUS; SUBCUTANEOUS at 06:05

## 2025-05-01 RX ADMIN — SEVELAMER CARBONATE 800 MG: 800 TABLET, FILM COATED ORAL at 11:55

## 2025-05-01 RX ADMIN — SODIUM CHLORIDE, PRESERVATIVE FREE 10 ML: 5 INJECTION INTRAVENOUS at 22:34

## 2025-05-01 RX ADMIN — HEPARIN SODIUM 5000 UNITS: 5000 INJECTION INTRAVENOUS; SUBCUTANEOUS at 21:30

## 2025-05-01 ASSESSMENT — PAIN SCALES - GENERAL
PAINLEVEL_OUTOF10: 0

## 2025-05-01 NOTE — CARE COORDINATION
05/01/25 0827   Service Assessment   Patient Orientation Alert and Oriented   Cognition Alert   History Provided By Patient   Primary Caregiver Self   Accompanied By/Relationship N/A   Support Systems Family Members   PCP Verified by CM No  (Patient will need a new PCP)   Prior Functional Level Independent in ADLs/IADLs   Current Functional Level Independent in ADLs/IADLs   Can patient return to prior living arrangement Yes   Ability to make needs known: Good   Family able to assist with home care needs: Yes   Would you like for me to discuss the discharge plan with any other family members/significant others, and if so, who? No   Financial Resources Medicare   Social/Functional History   Lives With Alone   Type of Home Apartment   Home Equipment Rollator  (Prosthetic Leg)   Prior Level of Assist for ADLs Independent   Prior Level of Assist for Homemaking Independent   Ambulation Assistance Independent   Prior Level of Assist for Transfers Independent   Active  Yes   Occupation On disability   Discharge Planning   Type of Residence Apartment   Living Arrangements Alone   Current Services Prior To Admission None   Potential Assistance Needed Home Care   DME Ordered? No   Potential Assistance Purchasing Medications No   Type of Home Care Services None   Patient expects to be discharged to: Apartment   Services At/After Discharge   Transition of Care Consult (CM Consult) Discharge Planning;Home Health   Services At/After Discharge None   Mode of Transport at Discharge Other (see comment)  (Family)   Confirm Follow Up Transport Self   Condition of Participation: Discharge Planning   The Plan for Transition of Care is related to the following treatment goals: Home when stable   The Patient and/or Patient Representative was provided with a Choice of Provider? Patient   The Patient and/Or Patient Representative agree with the Discharge Plan? Yes   Freedom of Choice list was provided with basic dialogue that

## 2025-05-01 NOTE — CONSULTS
RENAL CONSULT  2025    Patient:  Evan Huizar  :  1970  Gender:  male  MRN #:  [id number]    Reason for Consult: uremia , acidosis and ESRD     History of Present Illness:    Evan Huizar is a 54 y.o. year old male  ESRD on HD,  DM-2, anemia, uncontrolled hypertension , hyperphosphatemia,  non compliant to dialysis prescription, dietary/life style modification was brought in emergency for decreased responsiveness    He had missed last few session of dialysis .   Was uremic on arrival , hypoglycemic and had significant anion gap metabolic acidosis   Had urgent dialysis last night   When I saw him this morning he is still uremic , but arousable   Says he feels little better , denied chest pain and vomiting       Past Medical History:   Diagnosis Date    (HFpEF) heart failure with preserved ejection fraction (HCC)     Chronic disease anemia     Chronic ulcer of left foot with fat layer exposed (HCC)     Diabetes (HCC)     ESRD (end stage renal disease) on dialysis (HCC)     Heart failure (HCC)     Hemodialysis patient     Hx of BKA, right (HCC)     Hypertension     Medically noncompliant     Perineal abscess 2024    Psychiatric illness 2023    Sickle cell trait      Past Surgical History:   Procedure Laterality Date    IR NONTUNNELED VASCULAR CATHETER > 5 YEARS  2022    IR NONTUNNELED VASCULAR CATHETER 2022 [facility]    IR NONTUNNELED VASCULAR CATHETER > 5 YEARS  2022    IR NONTUNNELED VASCULAR CATHETER > 5 YEARS  2023    IR NONTUNNELED VASCULAR CATHETER 2023 [facility]    IR THRMB/INFUSION DIALYSIS CIRCUIT  6/3/2022    IR THRMB/INFUSION DIAYSIS CIRCUIT 6/3/2022 [facility]    IR THRMB/INFUSION DIALYSIS CIRCUIT  6/3/2022    IR TUNNELED CVC PLACE WO SQ PORT/PUMP > 5 YEARS  2022    IR TUNNELED CATHETER PLACEMENT GREATER THAN 5 YEARS 2022 [facility]    IR TUNNELED CVC PLACE WO SQ PORT/PUMP > 5 YEARS  2022    IR TUNNELED CVC PLACE WO SQ  tablet 650 mg  650 mg Oral Q6H PRN Roy, Nakia, DO        Or    acetaminophen (TYLENOL) suppository 650 mg  650 mg Rectal Q6H PRN Roy, Nakia, DO        senna (SENOKOT) tablet 8.6 mg  1 tablet Oral Daily PRN Roy, Nakia, DO        glucose chewable tablet 16 g  4 tablet Oral PRN Roy, Nakia, DO        dextrose bolus 10% 125 mL  125 mL IntraVENous PRN Roy, Nakia, DO        Or    dextrose bolus 10% 250 mL  250 mL IntraVENous PRN Roy, Nakia, DO        glucagon injection 1 mg  1 mg SubCUTAneous PRN Roy, Nakia, DO        dextrose 10 % infusion   IntraVENous Continuous PRN Roy, Nakia, DO        insulin lispro (HUMALOG,ADMELOG) injection vial 0-4 Units  0-4 Units SubCUTAneous 4x Daily AC & HS Roy, Nakia, DO        amLODIPine (NORVASC) tablet 10 mg  10 mg Oral Daily RoyNakia, DO   10 mg at 04/30/25 2352       [unfilled]    Review of Symptoms:     Consitutional Symptoms: No fever, weight loss, weight gain and fatigue  Eyes:- No change in vision , no itching   Ears, Nose, Mouth, Throat:  No neck pain , swelling ,hearing loss and nose bleed.   Pulmonary: No cough and shortness of breath .  CVS: No  Chest pain , palpitation and orthopnea  GI: No nausea, vomiting, abdominal pain, and blood in stool   - No burning, frequency ,urgency  and difficulty voiding .  Neurological:No dizzy ness, syncope, focal weakness and numbness .  Skin : No rash and erythema   Endocrine: No feeling of excessive cold or warmth, hot flushes  Psychiatric: Denied feeling depressed    Objective:    /71   Pulse 98   Temp 98.2 °F (36.8 °C) (Oral)   Resp 20   Ht 1.702 m (5' 7\")   Wt 74 kg (163 lb 2.3 oz)   SpO2 99%   BMI 25.55 kg/m²     Physical Exam:    Pt awake,  alert and comfortable  HEENT: No JVD, anicteric sclera, no neck swelling  Lung: clear to auscultation, no crackles and wheeze  Heart: s1s2 regualr no rubs or murmur  Abdomen: soft, non tender, no guarding, normal bowel sounds.  Ext: no edema and pulsation

## 2025-05-01 NOTE — ED NOTES
ED TO INPATIENT SBAR HANDOFF     Patient Name: Evan Huizar   Preferred Name: Evan  : 1970  54 y.o.             Family/Caregiver Present: no   Code Status Order: Full Code  PO Status:[unfilled]  Telemetry Order: Yes  C-SSRS: Risk of Suicide: No Risk  Sitter no  n/a  Restraints:    Sepsis Risk Score Sepsis V2 Risk Score: 7.2    Situation:  Chief Complaint   Patient presents with    Hypoglycemia     Brief Description of Patient's Condition: Patient arrived from home not feeling well. He had new access for dialysis placed and was told to not eat for that procedure and patient did not eat for 2 days. Blood sugar was low in mid 30's per EMS. Patient was alert when he got to the ED and was given orange juice and merle crackers and sugar came up to normal level. Patient also had dinner in ED. Patient had dialysis at the bedside and sugar has remained above 100. Patient has hx of right BKA. Last POC glucose was 165.  Mental Status: oriented, alert, coherent, logical, thought processes intact, and able to concentrate and follow conversation  Arrived from: Home  Abnormal labs:   Abnormal Labs Reviewed   CBC WITH AUTO DIFFERENTIAL - Abnormal; Notable for the following components:       Result Value    RBC 4.27 (*)     Hemoglobin 10.2 (*)     Hematocrit 31.0 (*)     MCV 72.6 (*)     MCH 23.9 (*)     RDW 20.2 (*)     Neutrophils % 89.6 (*)     Lymphocytes % 4.4 (*)     Immature Granulocytes % 0.6 (*)     Lymphocytes Absolute 0.31 (*)     All other components within normal limits   COMPREHENSIVE METABOLIC PANEL - Abnormal; Notable for the following components:    CO2 11 (*)     Anion Gap 30 (*)     Glucose 45 (*)      (*)     Creatinine 22.60 (*)     BUN/Creatinine Ratio 7 (*)     Est, Glom Filt Rate 2 (*)     Calcium 8.1 (*)     ALT 6 (*)     AST 9 (*)     Albumin 2.8 (*)     Globulin 5.3 (*)     Albumin/Globulin Ratio 0.5 (*)     All other components within normal limits   POCT GLUCOSE - Abnormal; Notable for  the following components:    POC Glucose 52 (*)     All other components within normal limits   POCT GLUCOSE - Abnormal; Notable for the following components:    POC Glucose 60 (*)     All other components within normal limits   POCT GLUCOSE - Abnormal; Notable for the following components:    POC Glucose 165 (*)     All other components within normal limits   VENOUS BLOOD GAS, POINT OF CARE - Abnormal; Notable for the following components:    PH, VENOUS (POC) 7.46 (*)     PCO2, Melanie, POC 31.8 (*)     HCO3, Venous 22.3 (*)     All other components within normal limits        Background:  Allergies:   Allergies   Allergen Reactions    Vancomycin Other (See Comments)     Generalized desquamation - body-wide; not Red-Man    Penicillins Rash     History:   Past Medical History:   Diagnosis Date    Diabetes (HCC)     ESRD (end stage renal disease) on dialysis (HCC)     Heart failure (HCC)     Hemodialysis patient     Hx of BKA, right (HCC)     Hypertension     Psychiatric illness 04/22/2023    Sickle cell trait         Assessment:  Vitals/MEWS:    Level of Consciousness: Alert (0)   Vitals:    04/30/25 1945 04/30/25 2000 04/30/25 2015 04/30/25 2030   BP:  (!) 142/71 (!) 157/142 (!) 170/89   Pulse: 94 92 87 96   Resp:       SpO2:       Weight:       Height:         Cardiac Rhythm:       Deterioration Index:  O2 Flow Rate:    O2 Device: O2 Device: None (Room air)  Critical Lab Results: [unfilled]  Cultures:  n/a  NIH Score: NIH     Active LDA's:   Patient Lines/Drains/Airways Status       Active LDAs       Name Placement date Placement time Site Days    Peripheral IV 04/30/25 Right Antecubital 04/30/25  1611  Antecubital  less than 1    Extended Dwell Peripherial IV 01/07/25 Right Brachial 01/07/25  0937  -- 113    Tunneled Hemodialysis Catheter Right Subclavian 07/21/23  0904  -- 649    Hemodialysis Fistula/Graft Left Arm 02/19/24  0000  Arm  436                  Active Central Lines:                            Active

## 2025-05-01 NOTE — H&P
History & Physical      Patient: Evan Huizar MRN: [id number]  CSN: [id number]    YOB: 1970  Age: 54 y.o.  Sex: male      DOA: 4/30/2025  Chief Complaint:   Chief Complaint   Patient presents with    Hypoglycemia       Active Hospital Problems    Diagnosis Date Noted    Accelerated hypertension [I10] 04/30/2025    Metabolic acidosis [E87.20] 04/30/2025    Hyperkalemia, severe [E87.5] 01/07/2025    Hypoglycemia [E16.2] 02/19/2024    Non-compliance with renal dialysis [Z91.158] 08/27/2021    ESRD (end stage renal disease) on dialysis (HCC) [N18.6, Z99.2] 01/29/2020          Assessment & Plan      # Metabolic acidosis (w/ AG), mild hyperkalemia- due to uremia from missed HD x 1 week  # ESRD  # Medical noncompliance   - Nephrology, Dr Vincent, consulted.   HD initiated after admission; will be repeated in AM   - Acidosis,K will correct w/ HD.  No fluid overload currently.   - resume calcitriol, sevelamer TIDAC   - monitor labs,  telemetry   - continue pt education for compliance with medications, diet and HD    # Hypoglycemia- presented w/ glucose 45.    Reduced PO intake, missed HD   - Stable on D5W, wean off as tolerates.   Continuous accuchecks   - encourage nutritional intake     # Accelerated HTN-  has not filled any medications since 2023?   - resume prior meds of Labetalol 200 mg BID, hydralazine 100 mg TID, and amlodipine 10 mg/d   - salt restrict; continue HD    # Chronic anemia of renal disease- monitor H&H       Chronic medical issues:    # DM type 2- once glucose stabilizes; slowly add back SSI  # HFpEF- continue HD sessions;  resume prior BP meds  # ESRD - as above  # Hypertension- as above  # Sickle Cell Trait    Code Status:  Full   POA/Proxy:  Brother Edgar [phone]   DVT Prophylaxis:  [x]Lovenox  []Hep SQ   [] Eliquis, Xarelto   []Coumadin  []Heparin Drip   []SCDs   Case discussed with:  [x]Patient  []Family [x] Consultants  [x]Nursing  []Case Management  Expect the patient

## 2025-05-01 NOTE — PLAN OF CARE
Problem: Chronic Conditions and Co-morbidities  Goal: Patient's chronic conditions and co-morbidity symptoms are monitored and maintained or improved  5/1/2025 1346 by Manasa Maki, RN  Outcome: Progressing  Flowsheets (Taken 5/1/2025 0800)  Care Plan - Patient's Chronic Conditions and Co-Morbidity Symptoms are Monitored and Maintained or Improved:   Monitor and assess patient's chronic conditions and comorbid symptoms for stability, deterioration, or improvement   Collaborate with multidisciplinary team to address chronic and comorbid conditions and prevent exacerbation or deterioration   Update acute care plan with appropriate goals if chronic or comorbid symptoms are exacerbated and prevent overall improvement and discharge  5/1/2025 0501 by Mag Jamison, RN  Outcome: Progressing     Problem: Discharge Planning  Goal: Discharge to home or other facility with appropriate resources  5/1/2025 1346 by Manasa Maki, RN  Outcome: Progressing  Flowsheets (Taken 5/1/2025 0800)  Discharge to home or other facility with appropriate resources:   Identify barriers to discharge with patient and caregiver   Arrange for needed discharge resources and transportation as appropriate   Identify discharge learning needs (meds, wound care, etc)  5/1/2025 0501 by Mag Jamison, RN  Outcome: Progressing     Problem: Safety - Adult  Goal: Free from fall injury  5/1/2025 1346 by Manasa Maki, RN  Outcome: Progressing  5/1/2025 0501 by Mag Jamison, RN  Outcome: Progressing

## 2025-05-01 NOTE — PROGRESS NOTES
Care management specialist assisting . Patient currently with LIZABETH ELMORE. This writer sent updated notes and clinicals via [email or web address] ([employer]). Will continue to follow.

## 2025-05-02 VITALS
OXYGEN SATURATION: 97 % | SYSTOLIC BLOOD PRESSURE: 163 MMHG | WEIGHT: 154.32 LBS | DIASTOLIC BLOOD PRESSURE: 81 MMHG | HEART RATE: 91 BPM | RESPIRATION RATE: 16 BRPM | HEIGHT: 67 IN | BODY MASS INDEX: 24.22 KG/M2 | TEMPERATURE: 97.8 F

## 2025-05-02 LAB
ALBUMIN SERPL-MCNC: 2.4 G/DL (ref 3.4–5)
ANION GAP SERPL CALC-SCNC: 15 MMOL/L (ref 3–18)
BUN SERPL-MCNC: 29 MG/DL (ref 6–23)
BUN/CREAT SERPL: 4 (ref 12–20)
CALCIUM SERPL-MCNC: 8.9 MG/DL (ref 8.5–10.1)
CHLORIDE SERPL-SCNC: 101 MMOL/L (ref 98–107)
CO2 SERPL-SCNC: 22 MMOL/L (ref 21–32)
CREAT SERPL-MCNC: 7.33 MG/DL (ref 0.6–1.3)
EKG ATRIAL RATE: 93 BPM
EKG DIAGNOSIS: NORMAL
EKG P AXIS: 77 DEGREES
EKG P-R INTERVAL: 168 MS
EKG Q-T INTERVAL: 410 MS
EKG QRS DURATION: 90 MS
EKG QTC CALCULATION (BAZETT): 509 MS
EKG R AXIS: 19 DEGREES
EKG T AXIS: 34 DEGREES
EKG VENTRICULAR RATE: 93 BPM
ERYTHROCYTE [DISTWIDTH] IN BLOOD BY AUTOMATED COUNT: 20.4 % (ref 11.6–14.5)
GLUCOSE BLD STRIP.AUTO-MCNC: 91 MG/DL (ref 70–110)
GLUCOSE SERPL-MCNC: 95 MG/DL (ref 74–108)
HCT VFR BLD AUTO: 34.1 % (ref 36–48)
HGB BLD-MCNC: 11.1 G/DL (ref 13–16)
MAGNESIUM SERPL-MCNC: 2.4 MG/DL (ref 1.6–2.6)
MCH RBC QN AUTO: 24 PG (ref 24–34)
MCHC RBC AUTO-ENTMCNC: 32.6 G/DL (ref 31–37)
MCV RBC AUTO: 73.7 FL (ref 78–100)
NRBC # BLD: 0 K/UL (ref 0–0.01)
NRBC BLD-RTO: 0 PER 100 WBC
PHOSPHATE SERPL-MCNC: 4 MG/DL (ref 2.5–4.9)
PLATELET # BLD AUTO: 185 K/UL (ref 135–420)
POTASSIUM SERPL-SCNC: 4.1 MMOL/L (ref 3.5–5.5)
RBC # BLD AUTO: 4.63 M/UL (ref 4.35–5.65)
SODIUM SERPL-SCNC: 137 MMOL/L (ref 136–145)
WBC # BLD AUTO: 5.4 K/UL (ref 4.6–13.2)

## 2025-05-02 PROCEDURE — 6370000000 HC RX 637 (ALT 250 FOR IP): Performed by: HOSPITALIST

## 2025-05-02 PROCEDURE — 83735 ASSAY OF MAGNESIUM: CPT

## 2025-05-02 PROCEDURE — 85027 COMPLETE CBC AUTOMATED: CPT

## 2025-05-02 PROCEDURE — 82962 GLUCOSE BLOOD TEST: CPT

## 2025-05-02 PROCEDURE — 80069 RENAL FUNCTION PANEL: CPT

## 2025-05-02 PROCEDURE — 90935 HEMODIALYSIS ONE EVALUATION: CPT

## 2025-05-02 PROCEDURE — 2500000003 HC RX 250 WO HCPCS: Performed by: HOSPITALIST

## 2025-05-02 PROCEDURE — 36415 COLL VENOUS BLD VENIPUNCTURE: CPT

## 2025-05-02 PROCEDURE — 93010 ELECTROCARDIOGRAM REPORT: CPT | Performed by: INTERNAL MEDICINE

## 2025-05-02 RX ORDER — AMLODIPINE BESYLATE 10 MG/1
10 TABLET ORAL DAILY
Qty: 30 TABLET | Refills: 0 | Status: SHIPPED | OUTPATIENT
Start: 2025-05-02

## 2025-05-02 RX ORDER — LABETALOL 300 MG/1
300 TABLET, FILM COATED ORAL EVERY 12 HOURS SCHEDULED
Qty: 60 TABLET | Refills: 3 | Status: SHIPPED | OUTPATIENT
Start: 2025-05-02

## 2025-05-02 RX ORDER — HYDRALAZINE HYDROCHLORIDE 100 MG/1
100 TABLET, FILM COATED ORAL EVERY 8 HOURS SCHEDULED
Qty: 90 TABLET | Refills: 0 | Status: SHIPPED | OUTPATIENT
Start: 2025-05-02

## 2025-05-02 RX ADMIN — SODIUM CHLORIDE, PRESERVATIVE FREE 10 ML: 5 INJECTION INTRAVENOUS at 10:41

## 2025-05-02 RX ADMIN — HYDRALAZINE HYDROCHLORIDE 100 MG: 50 TABLET ORAL at 04:47

## 2025-05-02 ASSESSMENT — PAIN SCALES - GENERAL
PAINLEVEL_OUTOF10: 0
PAINLEVEL_OUTOF10: 0

## 2025-05-02 NOTE — PLAN OF CARE
INTERVENTION:  HEMODYNAMIC STABILIZATION  MAINTAIN BP WNL WHILE ON HD.    INTERVENTION:  FLUID MANAGEMENT  WILL ATTEMPT 2500 ML TOTAL FLUID REMOVAL AS TOLERATED.    INTERVENTION:  METABOLIC/ELECTROLYTE MANAGEMENT  2.0 POTASSIUM 2.5 CALCIUM DIALYSATE USED WITH HD TODAY.    INTERVENTION:  HEMODIALYSIS ACCESS SITE MANAGEMENT  RIGHT CHEST TDC ACCESSED USING ASEPTIC TECHNIQUE.    GOAL:  SIGNS AND SYMPTOMS OF LISTED POTENTIAL PROBLEMS WILL BE ABSENT OR MANAGEABLE.    OUTCOME:  PROGRESSING.    HD PLANNED FOR 4 HOURS TODAY.  Problem: Chronic Conditions and Co-morbidities  Goal: Patient's chronic conditions and co-morbidity symptoms are monitored and maintained or improved  5/2/2025 1121 by Hailey Pimentel, RN  Outcome: Progressing

## 2025-05-02 NOTE — PROGRESS NOTES
Received pre HD report from LATRICE Rodriguez RN.    0933: Came in patient's room 354 for HD today. With bowel movement noted and patient requesting to be cleaned first before start of treatment.    1030: Personal hygiene done by primary nurse. Pt in bed, A+O x4, no s/s of distress noted. On room air. VSS    Accessed right CVC w/o complications.  Tx initiated at 1052.      CVC flowing with ease.  For hemodynamic stability UF goal 2500 ml.  Offered assistance with repositioning every 2 hours.  Vascular access visible at all times during treatment, line connections intact at all times.      Tx completed at 1455, tolerated well 2L removed.  De-accessed per protocol.    Heparin indwell 1.6ml in arterial, and 1.6ml in venous catheter.      Unit nurse LATRICE Rodriguez, RN given report. No s/s of distress.

## 2025-05-02 NOTE — PLAN OF CARE
Problem: Chronic Conditions and Co-morbidities  Goal: Patient's chronic conditions and co-morbidity symptoms are monitored and maintained or improved  5/2/2025 0117 by Lauren King RN  Outcome: Progressing  5/1/2025 2241 by Maci Woody RN  Outcome: Progressing  5/1/2025 1346 by Manasa Maki RN  Outcome: Progressing  Flowsheets (Taken 5/1/2025 0800)  Care Plan - Patient's Chronic Conditions and Co-Morbidity Symptoms are Monitored and Maintained or Improved:   Monitor and assess patient's chronic conditions and comorbid symptoms for stability, deterioration, or improvement   Collaborate with multidisciplinary team to address chronic and comorbid conditions and prevent exacerbation or deterioration   Update acute care plan with appropriate goals if chronic or comorbid symptoms are exacerbated and prevent overall improvement and discharge     Problem: Safety - Adult  Goal: Free from fall injury  5/2/2025 0117 by Lauren King, RN  Outcome: Progressing  5/1/2025 1346 by Manasa Maki RN  Outcome: Progressing     Problem: Pain  Goal: Verbalizes/displays adequate comfort level or baseline comfort level  Outcome: Progressing  Flowsheets (Taken 5/1/2025 2257)  Verbalizes/displays adequate comfort level or baseline comfort level:   Encourage patient to monitor pain and request assistance   Assess pain using appropriate pain scale   Administer analgesics based on type and severity of pain and evaluate response   Implement non-pharmacological measures as appropriate and evaluate response   Notify Licensed Independent Practitioner if interventions unsuccessful or patient reports new pain

## 2025-05-02 NOTE — PROGRESS NOTES
Entered patients chart, [facility] on phone to get update on patient. Primary RN not available, confirmed with patient that it was okay to talk to Dialysis center and provide information patient agreed that it was okay.

## 2025-05-02 NOTE — PROGRESS NOTES
RENAL CONSULT PROGRESS NOTE   2025    Patient:  Evan Huizar  :  1970  Gender:  male  MRN #:  [id number]    Reason for Consult: uremia , acidosis and ESRD     Subjective:   Saw him during dialysis , feels fine  BP stable   Denied dyspnea or nausea      Objective:    BP (!) 160/82   Pulse 85   Temp 98.1 °F (36.7 °C) (Oral)   Resp 16   Ht 1.702 m (5' 7\")   Wt 72 kg (158 lb 11.7 oz)   SpO2 95%   BMI 24.86 kg/m²     Physical Exam:    Pt awake,  alert and comfortable  Lung: clear to auscultation  CNS- Oriented to time , place and person   No edema     Laboratory Data:    Lab Results   Component Value Date    BUN 29 (H) 2025    BUN 58 (H) 2025     (H) 2025     2025     2025     2025    CO2 22 2025    CO2 19 (L) 2025    CO2 11 (L) 2025    GLUCOSE 95 2025    GLUCOSE 87 2025    GLUCOSE 45 (LL) 2025     Lab Results   Component Value Date    WBC 5.4 2025    HGB 11.1 (L) 2025    HCT 34.1 (L) 2025     Lab Results   Component Value Date    CALCIUM 8.9 2025     Lab Results   Component Value Date    HDL 62 (H) 2022     No results found for: \"TURBIDITY\"    Imaging Reveiwed:      EKG  Renal ultrasound  Xray        Assessment:    Evna Huizar is a 54 y.o. year old male  ESRD on HD,  DM-2, anemia, uncontrolled hypertension , hyperphosphatemia,  non compliant to dialysis prescription, dietary/life style modification was brought in emergency for decreased responsiveness  He had missed last few session of dialysis despite reminder and explain the risk of cardiac arrest . He miss dialysis very often   Was uremic on arrival , hypoglycemic and had significant anion gap metabolic acidosis   This morning also he is uremic     Uremia due to missed HD  Hypoglycemia   ESRD  Medical non compliance  Hypertension   Hyperphosphatemia   Metabolic acidosis          Plan:    Saw him during dialysis ,

## 2025-05-02 NOTE — PROGRESS NOTES
Hospitalist Progress Note    Patient: Evan Huizar MRN: [id number]  CSN: [id number]    YOB: 1970  Age: 54 y.o.  Sex: male    DOA: 4/30/2025 LOS:  LOS: 2 days         Total duration of encounter: 2 days    Admitted with Uremia and hypoglycemia  Chief Complaint ;    More awake  Loose stools  Missed 2-3 dialysis   Subjective ;      Review of systems:  Constitutional: denies fevers, chills, myalgias  Respiratory: denies SOB, cough  Cardiovascular: denies chest pain, palpitations  Gastrointestinal: denies nausea, vomiting, diarrhea    10 systems reviewed, all negative other than what is mentioned above.      Vital signs/Intake and Output:  Visit Vitals  BP (!) 158/89   Pulse 89   Temp 98.1 °F (36.7 °C) (Oral)   Resp 16   Ht 1.702 m (5' 7\")   Wt 74 kg (163 lb 2.3 oz)   SpO2 95%   BMI 25.55 kg/m²     Current Shift:  No intake/output data recorded.  Last three shifts:  04/30 1901 - 05/02 0700  In: 2440 [P.O.:1440]  Out: 6000     Exam:    General: Well developed, alert, NAD, OX3  Head/Neck: NCAT, supple, No masses, No lymphadenopathy  CVS:Regular rate and rhythm, no M/R/G, S1/S2 heard, no thrill  Lungs:Clear to auscultation bilaterally, no wheezes, rhonchi, or rales  Abdomen: Soft, Nontender, No distention, Normal Bowel sounds, No hepatomegaly  Extremities:  bka   Skin:normal texture and turgor, no rashes, no lesions  Neuro:grossly normal , follows commands  Psych:appropriate    Labs: Results:       Chemistry Recent Labs     04/30/25  1606 05/01/25  0431 05/02/25  0436   GLUCOSE 45* 87 95    137 137   K 5.4 3.6 4.1   CL 98 100 101   CO2 11* 19* 22   * 58* 29*   CREATININE 22.60* 11.20* 7.33*   CALCIUM 8.1* 8.1* 8.9   BILITOT 0.4  --   --    AST 9*  --   --    ALT 6*  --   --    ALKPHOS 77  --   --    GLOB 5.3*  --   --    LABGLOM 2* 5* 8*      CBC w/Diff Recent Labs     04/30/25  1606 05/01/25  0431 05/02/25  0436   WBC 7.1 5.9 5.4   RBC 4.27* 4.17* 4.63   HGB 10.2* 10.1* 11.1*   HCT 31.0*

## 2025-05-02 NOTE — PROGRESS NOTES
Hospitalist Progress Note    Patient: Evan Huizar MRN: [id number]  CSN: [id number]    YOB: 1970  Age: 54 y.o.  Sex: male    DOA: 4/30/2025 LOS:  LOS: 2 days         Total duration of encounter: 2 days    Admitted with Uremia and hypoglycemia  Chief Complaint ;    More awake  Loose stools  Missed 2-3 dialysis   Subjective ;      Review of systems:  Constitutional: denies fevers, chills, myalgias  Respiratory: denies SOB, cough  Cardiovascular: denies chest pain, palpitations  Gastrointestinal: denies nausea, vomiting, diarrhea    10 systems reviewed, all negative other than what is mentioned above.      Vital signs/Intake and Output:  Visit Vitals  BP (!) 158/89   Pulse 89   Temp 98.1 °F (36.7 °C) (Oral)   Resp 16   Ht 1.702 m (5' 7\")   Wt 74 kg (163 lb 2.3 oz)   SpO2 95%   BMI 25.55 kg/m²     Current Shift:  No intake/output data recorded.  Last three shifts:  04/30 1901 - 05/02 0700  In: 2440 [P.O.:1440]  Out: 6000     Exam:    General: Well developed, alert, NAD, OX3  Head/Neck: NCAT, supple, No masses, No lymphadenopathy  CVS:Regular rate and rhythm, no M/R/G, S1/S2 heard, no thrill  Lungs:Clear to auscultation bilaterally, no wheezes, rhonchi, or rales  Abdomen: Soft, Nontender, No distention, Normal Bowel sounds, No hepatomegaly  Extremities:  bka   Skin:normal texture and turgor, no rashes, no lesions  Neuro:grossly normal , follows commands  Psych:appropriate    Labs: Results:       Chemistry Recent Labs     04/30/25  1606 05/01/25  0431 05/02/25  0436   GLUCOSE 45* 87 95    137 137   K 5.4 3.6 4.1   CL 98 100 101   CO2 11* 19* 22   * 58* 29*   CREATININE 22.60* 11.20* 7.33*   CALCIUM 8.1* 8.1* 8.9   BILITOT 0.4  --   --    AST 9*  --   --    ALT 6*  --   --    ALKPHOS 77  --   --    GLOB 5.3*  --   --    LABGLOM 2* 5* 8*      CBC w/Diff Recent Labs     04/30/25  1606 05/01/25  0431 05/02/25  0436   WBC 7.1 5.9 5.4   RBC 4.27* 4.17* 4.63   HGB 10.2* 10.1* 11.1*   HCT 31.0*

## 2025-05-02 NOTE — PROGRESS NOTES
TREATMENT SUMMARY     Patient dialyzed in room 354  Tolerated well without complaint or complications  RIJ TDC functioning well without difficulty access or maintaining BFR       2500 ml removed via UF with a net removal of 2000 ml  Report given to Lauren King RN, with all questions answered     TREATMENT NOTES

## 2025-05-02 NOTE — DISCHARGE SUMMARY
Discharge Summary    Patient: Evan Huizar               Sex: male          DOA: 4/30/2025         YOB: 1970      Age:  54 y.o.        LOS:  LOS: 2 days                Admit Date: 4/30/2025    Discharge Date: 5/2/2025    Admission Diagnoses: Hypoglycemia [E16.2]    Discharge Diagnoses:    Hospital Problems           Last Modified POA    * (Principal) Hypoglycemia 4/30/2025 Yes    Non-compliance with renal dialysis (Chronic) 4/30/2025 Yes    ESRD (end stage renal disease) on dialysis (HCC) (Chronic) 4/30/2025 Yes    Uremia 5/2/2025 Yes    Volume overload 5/2/2025 Yes    Hyperkalemia, severe 4/30/2025 Yes    Accelerated hypertension 4/30/2025 Yes    Metabolic acidosis 4/30/2025 Yes        Discharge Condition: Fair    Hospital Course ;   Uremia patient admitted with lethargy and confusion status he was dialyzed consecutively with improvement he also had hypoglycemia with that was replaced per protocol his Accu-Cheks were done and he was given D50 and glucagon protocol blood pressures remain high accelerated in nature he was started on amlodipine labetalol hydralazine pharmacy review shows no refills of his prescriptions.  Sporadic refills and January and February of this year  Discussed importance of compliance with dialysis he has had over 5 missed dialysis within a month intermittent compliance with his medications he is being discharged in a stable condition uremia had improved there was no evidence of hyperkalemia and his blood sugars remain elevated    Physical Exam:  BP (!) 163/81   Pulse 86   Temp 97.8 °F (36.6 °C)   Resp 16   Ht 1.702 m (5' 7\")   Wt 70 kg (154 lb 5.2 oz)   SpO2 97%   BMI 24.17 kg/m²   General appearance: alert, cooperative, no distress, appears stated age  Head: Normocephalic, without obvious abnormality, atraumatic  Neck: supple, trachea midline  Lungs: clear to auscultation bilaterally  Heart: regular rate and rhythm, S1, S2 normal, no murmur, click, rub or

## 2025-05-02 NOTE — PROGRESS NOTES
23:00 Shift assessment completed. See nsg flow sheet for details.    03:15 Reassessed with 0 changes noted. Resting quietly in bed with eyes closed between cares. remains @ bedside in the recliner.    07:30  Bedside and Verbal shift change report given to KRISTEN Rodriguez RN (oncoming nurse) by JOSE King RN (offgoing nurse). Report included the following information Nurse Handoff Repo

## 2025-05-02 NOTE — PLAN OF CARE
Problem: Chronic Conditions and Co-morbidities  Goal: Patient's chronic conditions and co-morbidity symptoms are monitored and maintained or improved  5/1/2025 2241 by Maci Woody, RN  Outcome: Progressing  5/1/2025 1346 by Manasa Maki, RN  Outcome: Progressing  Flowsheets (Taken 5/1/2025 0800)  Care Plan - Patient's Chronic Conditions and Co-Morbidity Symptoms are Monitored and Maintained or Improved:   Monitor and assess patient's chronic conditions and comorbid symptoms for stability, deterioration, or improvement   Collaborate with multidisciplinary team to address chronic and comorbid conditions and prevent exacerbation or deterioration   Update acute care plan with appropriate goals if chronic or comorbid symptoms are exacerbated and prevent overall improvement and discharge

## 2025-05-05 NOTE — PROGRESS NOTES
Physician Progress Note      PATIENT:               SIERRA WINTERS  CSN #:                  [id number]  :                       1970  ADMIT DATE:       2025 3:44 PM  DISCH DATE:        2025 6:54 PM  RESPONDING  PROVIDER #:        Jenny Karimi MD          QUERY TEXT:    Severe Hyperkalemia is documented in the medical record in  IM PN and Dc   summary. Please provide additional clinical indicators supportive of your   documentation. Or please document if the diagnosis of hyperkalemia has been   ruled out after study.    The clinical indicators include:  per  HP - 54 year old male, DM, ESRD on HD, HTN  presented to [facility] ED by ambulance, 2025, for hypoglycemia and decreased   responsiveness.  EMS was called and pt was found to be fatigued, weak with a   glucose level in the 30's.  He had reported lack of PO intake for at least 2   days; as he had to remain NPO for a HD catheter procedure.  He wasn't aware he   could eat after the procedure apparently.   Also had missed HD for many days.    He was given D10 by EMS.    per  IM PN - \"Hyperkalemia, severe\"    per  DC summary - Discharge Diagnosis: \"Hyperkalemia, severe\"    per Labs - Potassium : 5.4, : 3.6, : 4.1    Dialysis  Options provided:  -- Hyperkalemia present as evidenced by, Please document evidence.  -- Hyperkalemia was ruled out  -- Other - I will add my own diagnosis  -- Disagree - Not applicable / Not valid  -- Disagree - Clinically unable to determine / Unknown  -- Refer to Clinical Documentation Reviewer    PROVIDER RESPONSE TEXT:    Hyperkalemia was ruled out after study.    Query created by: Divine Hussein on 2025 6:31 AM      Electronically signed by:  Jenny Karimi MD 2025 2:44 PM

## 2025-06-09 ENCOUNTER — APPOINTMENT (OUTPATIENT)
Facility: HOSPITAL | Age: 55
End: 2025-06-09
Payer: MEDICARE

## 2025-06-09 ENCOUNTER — HOSPITAL ENCOUNTER (EMERGENCY)
Facility: HOSPITAL | Age: 55
Discharge: HOME | End: 2025-06-09
Payer: MEDICARE

## 2025-06-09 VITALS
TEMPERATURE: 97.9 F | HEART RATE: 82 BPM | SYSTOLIC BLOOD PRESSURE: 195 MMHG | HEIGHT: 67 IN | BODY MASS INDEX: 24.17 KG/M2 | RESPIRATION RATE: 16 BRPM | DIASTOLIC BLOOD PRESSURE: 91 MMHG | WEIGHT: 154 LBS

## 2025-06-09 DIAGNOSIS — M54.32 SCIATICA OF LEFT SIDE: Primary | ICD-10-CM

## 2025-06-09 LAB
ALBUMIN SERPL-MCNC: 2.6 G/DL (ref 3.4–5)
ALBUMIN/GLOB SERPL: 0.5 (ref 0.8–1.7)
ALP SERPL-CCNC: 75 U/L (ref 45–117)
ALT SERPL-CCNC: <5 U/L (ref 10–50)
ANION GAP SERPL CALC-SCNC: 21 MMOL/L (ref 3–18)
AST SERPL-CCNC: 8 U/L (ref 10–38)
BASOPHILS # BLD: 0.04 K/UL (ref 0–0.1)
BASOPHILS NFR BLD: 0.5 % (ref 0–2)
BILIRUB SERPL-MCNC: 0.4 MG/DL (ref 0.2–1)
BUN SERPL-MCNC: 78 MG/DL (ref 6–23)
BUN/CREAT SERPL: 5 (ref 12–20)
CALCIUM SERPL-MCNC: 8.8 MG/DL (ref 8.5–10.1)
CHLORIDE SERPL-SCNC: 95 MMOL/L (ref 98–107)
CO2 SERPL-SCNC: 19 MMOL/L (ref 21–32)
CREAT SERPL-MCNC: 15 MG/DL (ref 0.6–1.3)
DIFFERENTIAL METHOD BLD: ABNORMAL
ECHO BSA: 1.82 M2
EOSINOPHIL # BLD: 0.18 K/UL (ref 0–0.4)
EOSINOPHIL NFR BLD: 2.4 % (ref 0–5)
ERYTHROCYTE [DISTWIDTH] IN BLOOD BY AUTOMATED COUNT: 19.3 % (ref 11.6–14.5)
GLOBULIN SER CALC-MCNC: 5 G/DL (ref 2–4)
GLUCOSE SERPL-MCNC: 68 MG/DL (ref 74–108)
HCT VFR BLD AUTO: 22.8 % (ref 36–48)
HGB BLD-MCNC: 7.4 G/DL (ref 13–16)
IMM GRANULOCYTES # BLD AUTO: 0.02 K/UL (ref 0–0.04)
IMM GRANULOCYTES NFR BLD AUTO: 0.3 % (ref 0–0.5)
LYMPHOCYTES # BLD: 0.43 K/UL (ref 0.9–3.3)
LYMPHOCYTES NFR BLD: 5.7 % (ref 21–52)
MCH RBC QN AUTO: 24.8 PG (ref 24–34)
MCHC RBC AUTO-ENTMCNC: 32.5 G/DL (ref 31–37)
MCV RBC AUTO: 76.5 FL (ref 78–100)
MONOCYTES # BLD: 0.75 K/UL (ref 0.05–1.2)
MONOCYTES NFR BLD: 9.9 % (ref 3–10)
NEUTS SEG # BLD: 6.17 K/UL (ref 1.8–8)
NEUTS SEG NFR BLD: 81.2 % (ref 40–73)
NRBC # BLD: 0 K/UL (ref 0–0.01)
NRBC BLD-RTO: 0 PER 100 WBC
PLATELET # BLD AUTO: 190 K/UL (ref 135–420)
PMV BLD AUTO: 10.2 FL (ref 9.2–11.8)
POTASSIUM SERPL-SCNC: 4.4 MMOL/L (ref 3.5–5.5)
PROT SERPL-MCNC: 7.6 G/DL (ref 6.4–8.2)
RBC # BLD AUTO: 2.98 M/UL (ref 4.35–5.65)
SODIUM SERPL-SCNC: 135 MMOL/L (ref 136–145)
WBC # BLD AUTO: 7.6 K/UL (ref 4.6–13.2)

## 2025-06-09 PROCEDURE — 80053 COMPREHEN METABOLIC PANEL: CPT

## 2025-06-09 PROCEDURE — 6370000000 HC RX 637 (ALT 250 FOR IP)

## 2025-06-09 PROCEDURE — 93971 EXTREMITY STUDY: CPT

## 2025-06-09 PROCEDURE — 85025 COMPLETE CBC W/AUTO DIFF WBC: CPT

## 2025-06-09 PROCEDURE — 99284 EMERGENCY DEPT VISIT MOD MDM: CPT

## 2025-06-09 RX ORDER — CYCLOBENZAPRINE HCL 10 MG
10 TABLET ORAL
Status: COMPLETED | OUTPATIENT
Start: 2025-06-09 | End: 2025-06-09

## 2025-06-09 RX ORDER — OXYCODONE AND ACETAMINOPHEN 5; 325 MG/1; MG/1
1 TABLET ORAL
Refills: 0 | Status: COMPLETED | OUTPATIENT
Start: 2025-06-09 | End: 2025-06-09

## 2025-06-09 RX ADMIN — CYCLOBENZAPRINE 10 MG: 10 TABLET, FILM COATED ORAL at 15:50

## 2025-06-09 RX ADMIN — OXYCODONE AND ACETAMINOPHEN 1 TABLET: 5; 325 TABLET ORAL at 15:50

## 2025-06-09 ASSESSMENT — PAIN DESCRIPTION - DESCRIPTORS: DESCRIPTORS: SHARP

## 2025-06-09 ASSESSMENT — PAIN DESCRIPTION - LOCATION: LOCATION: LEG

## 2025-06-09 ASSESSMENT — PAIN DESCRIPTION - ORIENTATION: ORIENTATION: LEFT

## 2025-06-09 ASSESSMENT — PAIN SCALES - GENERAL: PAINLEVEL_OUTOF10: 9

## 2025-06-09 NOTE — ED TRIAGE NOTES
Pt arrived via EMS. C/C back pain with leg pain. Pt reports it feels like sciatica.    Pt has missed dialysis for the past week.
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
## 2025-06-11 ENCOUNTER — APPOINTMENT (OUTPATIENT)
Facility: HOSPITAL | Age: 55
End: 2025-06-11
Payer: MEDICARE

## 2025-06-11 ENCOUNTER — HOSPITAL ENCOUNTER (EMERGENCY)
Facility: HOSPITAL | Age: 55
Discharge: HOME | End: 2025-06-11
Payer: MEDICARE

## 2025-06-11 VITALS
OXYGEN SATURATION: 96 % | RESPIRATION RATE: 20 BRPM | DIASTOLIC BLOOD PRESSURE: 110 MMHG | WEIGHT: 153 LBS | HEIGHT: 67 IN | TEMPERATURE: 97.8 F | HEART RATE: 97 BPM | SYSTOLIC BLOOD PRESSURE: 183 MMHG | BODY MASS INDEX: 24.01 KG/M2

## 2025-06-11 DIAGNOSIS — M54.32 SCIATICA OF LEFT SIDE: Primary | ICD-10-CM

## 2025-06-11 PROCEDURE — 6370000000 HC RX 637 (ALT 250 FOR IP)

## 2025-06-11 PROCEDURE — 72100 X-RAY EXAM L-S SPINE 2/3 VWS: CPT

## 2025-06-11 PROCEDURE — 99283 EMERGENCY DEPT VISIT LOW MDM: CPT

## 2025-06-11 RX ORDER — OXYCODONE HYDROCHLORIDE 5 MG/1
5 TABLET ORAL
Refills: 0 | Status: COMPLETED | OUTPATIENT
Start: 2025-06-11 | End: 2025-06-11

## 2025-06-11 RX ORDER — CYCLOBENZAPRINE HCL 10 MG
10 TABLET ORAL
Status: COMPLETED | OUTPATIENT
Start: 2025-06-11 | End: 2025-06-11

## 2025-06-11 RX ORDER — LIDOCAINE 50 MG/G
1 PATCH TOPICAL DAILY
Qty: 10 PATCH | Refills: 0 | Status: SHIPPED | OUTPATIENT
Start: 2025-06-11 | End: 2025-06-21

## 2025-06-11 RX ORDER — OXYCODONE HYDROCHLORIDE 5 MG/1
5 TABLET ORAL EVERY 6 HOURS PRN
Qty: 12 TABLET | Refills: 0 | Status: SHIPPED | OUTPATIENT
Start: 2025-06-11 | End: 2025-06-14

## 2025-06-11 RX ADMIN — CYCLOBENZAPRINE 10 MG: 10 TABLET, FILM COATED ORAL at 17:41

## 2025-06-11 RX ADMIN — OXYCODONE 5 MG: 5 TABLET ORAL at 17:41

## 2025-06-11 ASSESSMENT — PAIN DESCRIPTION - DESCRIPTORS: DESCRIPTORS: SHARP

## 2025-06-11 ASSESSMENT — PAIN SCALES - GENERAL: PAINLEVEL_OUTOF10: 7

## 2025-06-11 ASSESSMENT — PAIN DESCRIPTION - LOCATION: LOCATION: LEG

## 2025-06-11 ASSESSMENT — ENCOUNTER SYMPTOMS: BACK PAIN: 1

## 2025-06-11 ASSESSMENT — PAIN DESCRIPTION - ORIENTATION: ORIENTATION: LEFT

## 2025-06-11 NOTE — ED TRIAGE NOTES
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    Hypoxia 08/26/2022    Hypertensive emergency 08/26/2022    Cardiomegaly 08/27/2022    Pulmonary infiltrates 08/27/2022    History of COVID-19 08/27/2022    Chronic diarrhea 08/28/2022    Admission for fitting and adjustment of dialysis catheter 02/06/2023    End-stage renal disease needing dialysis (HCC) 02/06/2023    Diabetes ([facility])     History of hypoglycemia     Toxic metabolic encephalopathy 04/05/2023    Hypertensive urgency 04/22/2023    Acute hypoxemic respiratory failure (HCC) 04/22/2023    Acute respiratory failure with hypoxemia ([facility]) 04/22/2023    Hypoxia 05/08/2023     Past Medical History:   Diagnosis Date    (HFpEF) heart failure with preserved ejection fraction (HCC)     Chronic disease anemia     Heart failure (HCC)     Hemodialysis patient     Medically noncompliant     Sickle cell trait

## 2025-06-11 NOTE — ED PROVIDER NOTES
NORMODYNE  Take 1 tablet by mouth every 12 hours     sevelamer 800 MG tablet  Commonly known as: RENVELA               Where to Get Your Medications        These medications were sent to [facility]/pharmacy #[id number] - [place], [place] - [address] - P [phone] - F [phone] [address] [facility] [address]      Phone: [phone]   lidocaine 5 %  oxyCODONE 5 MG immediate release tablet           DISCONTINUED MEDICATIONS:  Current Discharge Medication List          I have seen and evaluated the patient autonomously. My supervision physician was on site and available for consultation if needed.     I am the Primary Clinician of Record.   Marcia Stevenson PA-C (electronically signed)    (Please note that parts of this dictation were completed with voice recognition software. Quite often unanticipated grammatical, syntax, homophones, and other interpretive errors are inadvertently transcribed by the computer software. Please disregards these errors. Please excuse any errors that have escaped fBecause he states this is the only position he is comfortable in.inal proofreading.)     Marcia Stevenson PA-C  06/11/25 [id number]

## 2025-06-12 ENCOUNTER — HOSPITAL ENCOUNTER (EMERGENCY)
Facility: HOSPITAL | Age: 55
Discharge: HOME | DRG: 674 | End: 2025-06-15
Payer: MEDICARE

## 2025-06-12 ENCOUNTER — HOSPITAL ENCOUNTER (EMERGENCY)
Facility: HOSPITAL | Age: 55
Discharge: HOME | DRG: 674 | End: 2025-06-12
Attending: EMERGENCY MEDICINE
Payer: MEDICARE

## 2025-06-12 VITALS
SYSTOLIC BLOOD PRESSURE: 160 MMHG | RESPIRATION RATE: 18 BRPM | OXYGEN SATURATION: 97 % | DIASTOLIC BLOOD PRESSURE: 123 MMHG | HEART RATE: 103 BPM
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
## 2025-06-12 DIAGNOSIS — S01.511A LIP LACERATION, INITIAL ENCOUNTER: ICD-10-CM

## 2025-06-12 DIAGNOSIS — S00.83XA FACIAL CONTUSION, INITIAL ENCOUNTER: Primary | ICD-10-CM

## 2025-06-12 DIAGNOSIS — S02.5XXB OPEN FRACTURE OF TOOTH, INITIAL ENCOUNTER: ICD-10-CM

## 2025-06-12 PROCEDURE — 70450 CT HEAD/BRAIN W/O DYE: CPT

## 2025-06-12 PROCEDURE — 70486 CT MAXILLOFACIAL W/O DYE: CPT

## 2025-06-12 PROCEDURE — 6370000000 HC RX 637 (ALT 250 FOR IP): Performed by: EMERGENCY MEDICINE

## 2025-06-12 RX ORDER — GINSENG 100 MG
CAPSULE ORAL
Status: COMPLETED | OUTPATIENT
Start: 2025-06-12 | End: 2025-06-12

## 2025-06-12 RX ORDER — OXYCODONE HYDROCHLORIDE 5 MG/1
5 TABLET ORAL ONCE
Refills: 0 | Status: COMPLETED | OUTPATIENT
Start: 2025-06-12 | End: 2025-06-12

## 2025-06-12 RX ORDER — CHLORHEXIDINE GLUCONATE ORAL RINSE 1.2 MG/ML
15 SOLUTION DENTAL 2 TIMES DAILY
Qty: 210 ML | Refills: 0 | Status: ON HOLD | OUTPATIENT
Start: 2025-06-12 | End: 2025-06-20 | Stop reason: HOSPADM

## 2025-06-12 RX ADMIN — Medication 3 ML: at 05:10

## 2025-06-12 RX ADMIN — OXYCODONE 5 MG: 5 TABLET ORAL at 06:01

## 2025-06-12 RX ADMIN — BACITRACIN: 500 OINTMENT TOPICAL at 06:01
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
## 2025-06-12 NOTE — DISCHARGE INSTRUCTIONS
Can ice pack to the swollen, painful area of your face.  Apply bacitracin or Neosporin ointment twice daily to the laceration of the upper lip exteriorly and anteriorly until the sutures are removed in approximately 1 week ideally.  Can also take the prescribed medication from the ED last night as needed for pain relief.  Use the Peridex mouthwash to help sterilize the mouth and reduce risk of infection of the upper lip laceration.

## 2025-06-15 ENCOUNTER — APPOINTMENT (OUTPATIENT)
Facility: HOSPITAL | Age: 55
DRG: 674 | End: 2025-06-15
Payer: MEDICARE

## 2025-06-15 ENCOUNTER — HOSPITAL ENCOUNTER (INPATIENT)
Facility: HOSPITAL | Age: 55
LOS: 6 days | Discharge: HOME HEALTH | DRG: 674 | End: 2025-06-21
Attending: INTERNAL MEDICINE | Admitting: INTERNAL MEDICINE
Payer: MEDICARE

## 2025-06-15 DIAGNOSIS — T82.42XA DISPLACEMENT OF VASCULAR DIALYSIS CATHETER, INITIAL ENCOUNTER: Primary | ICD-10-CM

## 2025-06-15 DIAGNOSIS — I16.0 HYPERTENSIVE URGENCY: ICD-10-CM

## 2025-06-15 DIAGNOSIS — R94.31 PROLONGED Q-T INTERVAL ON ECG: ICD-10-CM

## 2025-06-15 DIAGNOSIS — R55 SYNCOPE, UNSPECIFIED SYNCOPE TYPE: ICD-10-CM

## 2025-06-15 DIAGNOSIS — G89.4 CHRONIC PAIN SYNDROME: ICD-10-CM

## 2025-06-15 PROBLEM — I50.32 CHRONIC DIASTOLIC CHF (CONGESTIVE HEART FAILURE) (HCC): Chronic | Status: ACTIVE | Noted: 2023-04-22

## 2025-06-15 PROBLEM — I16.1 HYPERTENSIVE EMERGENCY: Status: ACTIVE | Noted: 2025-06-15

## 2025-06-15 PROBLEM — D64.9 ANEMIA: Status: ACTIVE | Noted: 2025-06-15

## 2025-06-15 PROBLEM — R78.81 BACTEREMIA: Status: ACTIVE | Noted: 2025-06-15

## 2025-06-15 LAB
ALBUMIN SERPL-MCNC: 2.5 G/DL (ref 3.4–5)
ALBUMIN/GLOB SERPL: 0.5 (ref 0.8–1.7)
ALP SERPL-CCNC: 72 U/L (ref 45–117)
ALT SERPL-CCNC: 5 U/L (ref 10–50)
ANION GAP SERPL CALC-SCNC: 22 MMOL/L (ref 3–18)
AST SERPL-CCNC: 8 U/L (ref 10–38)
BASOPHILS # BLD: 0.06 K/UL (ref 0–0.1)
BASOPHILS NFR BLD: 0.8 % (ref 0–2)
BILIRUB SERPL-MCNC: 0.4 MG/DL (ref 0.2–1)
BUN SERPL-MCNC: 98 MG/DL (ref 6–23)
BUN/CREAT SERPL: 6 (ref 12–20)
CALCIUM SERPL-MCNC: 8.5 MG/DL (ref 8.5–10.1)
CHLORIDE SERPL-SCNC: 98 MMOL/L (ref 98–107)
CO2 SERPL-SCNC: 18 MMOL/L (ref 21–32)
CREAT SERPL-MCNC: 16.5 MG/DL (ref 0.6–1.3)
DIFFERENTIAL METHOD BLD: ABNORMAL
EOSINOPHIL # BLD: 0.16 K/UL (ref 0–0.4)
EOSINOPHIL NFR BLD: 2 % (ref 0–5)
ERYTHROCYTE [DISTWIDTH] IN BLOOD BY AUTOMATED COUNT: 19.6 % (ref 11.6–14.5)
GLOBULIN SER CALC-MCNC: 4.7 G/DL (ref 2–4)
GLUCOSE SERPL-MCNC: 74 MG/DL (ref 74–108)
HCT VFR BLD AUTO: 22 % (ref 36–48)
HGB BLD-MCNC: 7 G/DL (ref 13–16)
IMM GRANULOCYTES # BLD AUTO: 0.04 K/UL (ref 0–0.04)
IMM GRANULOCYTES NFR BLD AUTO: 0.5 % (ref 0–0.5)
LACTATE BLD-SCNC: 0.68 MMOL/L (ref 0.4–2)
LYMPHOCYTES # BLD: 0.35 K/UL (ref 0.9–3.3)
LYMPHOCYTES NFR BLD: 4.4 % (ref 21–52)
MCH RBC QN AUTO: 24.8 PG (ref 24–34)
MCHC RBC AUTO-ENTMCNC: 31.8 G/DL (ref 31–37)
MCV RBC AUTO: 78 FL (ref 78–100)
MONOCYTES # BLD: 0.49 K/UL (ref 0.05–1.2)
MONOCYTES NFR BLD: 6.2 % (ref 3–10)
NEUTS SEG # BLD: 6.85 K/UL (ref 1.8–8)
NEUTS SEG NFR BLD: 86.1 % (ref 40–73)
NRBC # BLD: 0 K/UL (ref 0–0.01)
NRBC BLD-RTO: 0 PER 100 WBC
PLATELET # BLD AUTO: 263 K/UL (ref 135–420)
PMV BLD AUTO: 11.7 FL (ref 9.2–11.8)
POTASSIUM SERPL-SCNC: 4.7 MMOL/L (ref 3.5–5.5)
PROT SERPL-MCNC: 7.2 G/DL (ref 6.4–8.2)
RBC # BLD AUTO: 2.82 M/UL (ref 4.35–5.65)
SODIUM SERPL-SCNC: 139 MMOL/L (ref 136–145)
TROPONIN T SERPL HS-MCNC: 110 NG/L (ref 0–22)
WBC # BLD AUTO: 8 K/UL (ref 4.6–13.2)

## 2025-06-15 PROCEDURE — 71045 X-RAY EXAM CHEST 1 VIEW: CPT

## 2025-06-15 PROCEDURE — 87040 BLOOD CULTURE FOR BACTERIA: CPT

## 2025-06-15 PROCEDURE — 86900 BLOOD TYPING SEROLOGIC ABO: CPT

## 2025-06-15 PROCEDURE — 80053 COMPREHEN METABOLIC PANEL: CPT

## 2025-06-15 PROCEDURE — 96375 TX/PRO/DX INJ NEW DRUG ADDON: CPT

## 2025-06-15 PROCEDURE — 96374 THER/PROPH/DIAG INJ IV PUSH: CPT

## 2025-06-15 PROCEDURE — 6360000002 HC RX W HCPCS

## 2025-06-15 PROCEDURE — 86901 BLOOD TYPING SEROLOGIC RH(D): CPT

## 2025-06-15 PROCEDURE — 84484 ASSAY OF TROPONIN QUANT: CPT

## 2025-06-15 PROCEDURE — 1100000000 HC RM PRIVATE

## 2025-06-15 PROCEDURE — 83605 ASSAY OF LACTIC ACID: CPT

## 2025-06-15 PROCEDURE — 6360000002 HC RX W HCPCS: Performed by: INTERNAL MEDICINE

## 2025-06-15 PROCEDURE — 99285 EMERGENCY DEPT VISIT HI MDM: CPT

## 2025-06-15 PROCEDURE — 93005 ELECTROCARDIOGRAM TRACING: CPT

## 2025-06-15 PROCEDURE — 6370000000 HC RX 637 (ALT 250 FOR IP): Performed by: INTERNAL MEDICINE

## 2025-06-15 PROCEDURE — 2500000003 HC RX 250 WO HCPCS: Performed by: INTERNAL MEDICINE

## 2025-06-15 PROCEDURE — 85025 COMPLETE CBC W/AUTO DIFF WBC: CPT

## 2025-06-15 PROCEDURE — 86923 COMPATIBILITY TEST ELECTRIC: CPT

## 2025-06-15 PROCEDURE — 86850 RBC ANTIBODY SCREEN: CPT

## 2025-06-15 RX ORDER — BISACODYL 10 MG
10 SUPPOSITORY, RECTAL RECTAL DAILY PRN
Status: DISCONTINUED | OUTPATIENT
Start: 2025-06-15 | End: 2025-06-21 | Stop reason: HOSPADM

## 2025-06-15 RX ORDER — AMLODIPINE BESYLATE 5 MG/1
5 TABLET ORAL 2 TIMES DAILY
Status: DISCONTINUED | OUTPATIENT
Start: 2025-06-15 | End: 2025-06-21 | Stop reason: HOSPADM

## 2025-06-15 RX ORDER — POLYETHYLENE GLYCOL 3350 17 G/17G
17 POWDER, FOR SOLUTION ORAL DAILY PRN
Status: DISCONTINUED | OUTPATIENT
Start: 2025-06-15 | End: 2025-06-21 | Stop reason: HOSPADM

## 2025-06-15 RX ORDER — LABETALOL HYDROCHLORIDE 5 MG/ML
20 INJECTION, SOLUTION INTRAVENOUS EVERY 4 HOURS PRN
Status: DISCONTINUED | OUTPATIENT
Start: 2025-06-15 | End: 2025-06-15

## 2025-06-15 RX ORDER — ASPIRIN 81 MG/1
324 TABLET, CHEWABLE ORAL ONCE
Status: COMPLETED | OUTPATIENT
Start: 2025-06-15 | End: 2025-06-15

## 2025-06-15 RX ORDER — BISACODYL 5 MG/1
5 TABLET, DELAYED RELEASE ORAL DAILY
Status: DISCONTINUED | OUTPATIENT
Start: 2025-06-16 | End: 2025-06-21 | Stop reason: HOSPADM

## 2025-06-15 RX ORDER — ONDANSETRON 4 MG/1
4 TABLET, ORALLY DISINTEGRATING ORAL EVERY 8 HOURS PRN
Status: DISCONTINUED | OUTPATIENT
Start: 2025-06-15 | End: 2025-06-16 | Stop reason: SDUPTHER

## 2025-06-15 RX ORDER — ACETAMINOPHEN 325 MG/1
650 TABLET ORAL EVERY 6 HOURS PRN
Status: DISCONTINUED | OUTPATIENT
Start: 2025-06-15 | End: 2025-06-16

## 2025-06-15 RX ORDER — SEVELAMER CARBONATE 800 MG/1
800 TABLET, FILM COATED ORAL
Status: DISCONTINUED | OUTPATIENT
Start: 2025-06-16 | End: 2025-06-21 | Stop reason: HOSPADM

## 2025-06-15 RX ORDER — HYDRALAZINE HYDROCHLORIDE 25 MG/1
50 TABLET, FILM COATED ORAL EVERY 6 HOURS SCHEDULED
Status: DISCONTINUED | OUTPATIENT
Start: 2025-06-16 | End: 2025-06-21 | Stop reason: HOSPADM

## 2025-06-15 RX ORDER — CALCITRIOL 0.25 UG/1
0.5 CAPSULE, LIQUID FILLED ORAL DAILY
Status: DISCONTINUED | OUTPATIENT
Start: 2025-06-16 | End: 2025-06-21 | Stop reason: HOSPADM

## 2025-06-15 RX ORDER — LINEZOLID 2 MG/ML
600 INJECTION, SOLUTION INTRAVENOUS
Status: COMPLETED | OUTPATIENT
Start: 2025-06-15 | End: 2025-06-15

## 2025-06-15 RX ORDER — LABETALOL HYDROCHLORIDE 5 MG/ML
20 INJECTION, SOLUTION INTRAVENOUS EVERY 6 HOURS PRN
Status: DISCONTINUED | OUTPATIENT
Start: 2025-06-15 | End: 2025-06-21 | Stop reason: HOSPADM

## 2025-06-15 RX ORDER — HYDRALAZINE HYDROCHLORIDE 25 MG/1
25 TABLET, FILM COATED ORAL EVERY 6 HOURS SCHEDULED
Status: DISCONTINUED | OUTPATIENT
Start: 2025-06-16 | End: 2025-06-15

## 2025-06-15 RX ORDER — ONDANSETRON 2 MG/ML
4 INJECTION INTRAMUSCULAR; INTRAVENOUS EVERY 6 HOURS PRN
Status: DISCONTINUED | OUTPATIENT
Start: 2025-06-15 | End: 2025-06-16 | Stop reason: SDUPTHER

## 2025-06-15 RX ORDER — HYDRALAZINE HYDROCHLORIDE 20 MG/ML
10 INJECTION INTRAMUSCULAR; INTRAVENOUS EVERY 6 HOURS PRN
Status: DISCONTINUED | OUTPATIENT
Start: 2025-06-15 | End: 2025-06-21 | Stop reason: HOSPADM

## 2025-06-15 RX ORDER — CINACALCET 30 MG/1
90 TABLET, FILM COATED ORAL DAILY
Status: DISCONTINUED | OUTPATIENT
Start: 2025-06-16 | End: 2025-06-21 | Stop reason: HOSPADM

## 2025-06-15 RX ORDER — LABETALOL 200 MG/1
200 TABLET, FILM COATED ORAL EVERY 8 HOURS SCHEDULED
Status: DISCONTINUED | OUTPATIENT
Start: 2025-06-15 | End: 2025-06-21

## 2025-06-15 RX ORDER — LABETALOL HYDROCHLORIDE 5 MG/ML
20 INJECTION, SOLUTION INTRAVENOUS
Status: COMPLETED | OUTPATIENT
Start: 2025-06-15 | End: 2025-06-15

## 2025-06-15 RX ORDER — HYDRALAZINE HYDROCHLORIDE 20 MG/ML
10 INJECTION INTRAMUSCULAR; INTRAVENOUS ONCE
Status: COMPLETED | OUTPATIENT
Start: 2025-06-15 | End: 2025-06-15

## 2025-06-15 RX ORDER — SODIUM CHLORIDE 9 MG/ML
INJECTION, SOLUTION INTRAVENOUS PRN
Status: DISCONTINUED | OUTPATIENT
Start: 2025-06-15 | End: 2025-06-21 | Stop reason: HOSPADM

## 2025-06-15 RX ORDER — SODIUM CHLORIDE 0.9 % (FLUSH) 0.9 %
5-40 SYRINGE (ML) INJECTION PRN
Status: DISCONTINUED | OUTPATIENT
Start: 2025-06-15 | End: 2025-06-21 | Stop reason: HOSPADM

## 2025-06-15 RX ORDER — ACETAMINOPHEN 650 MG/1
650 SUPPOSITORY RECTAL EVERY 6 HOURS PRN
Status: DISCONTINUED | OUTPATIENT
Start: 2025-06-15 | End: 2025-06-16

## 2025-06-15 RX ORDER — SODIUM CHLORIDE 0.9 % (FLUSH) 0.9 %
5-40 SYRINGE (ML) INJECTION EVERY 12 HOURS SCHEDULED
Status: DISCONTINUED | OUTPATIENT
Start: 2025-06-15 | End: 2025-06-21 | Stop reason: HOSPADM

## 2025-06-15 RX ADMIN — HYDRALAZINE HYDROCHLORIDE 50 MG: 25 TABLET ORAL at 22:49

## 2025-06-15 RX ADMIN — SODIUM CHLORIDE, PRESERVATIVE FREE 10 ML: 5 INJECTION INTRAVENOUS at 22:50

## 2025-06-15 RX ADMIN — LABETALOL HYDROCHLORIDE 20 MG: 5 INJECTION, SOLUTION INTRAVENOUS at 20:08

## 2025-06-15 RX ADMIN — AMLODIPINE BESYLATE 5 MG: 5 TABLET ORAL at 22:49

## 2025-06-15 RX ADMIN — HYDRALAZINE HYDROCHLORIDE 10 MG: 20 INJECTION INTRAMUSCULAR; INTRAVENOUS at 22:49

## 2025-06-15 RX ADMIN — LABETALOL HCL 200 MG: 200 TABLET, FILM COATED ORAL at 22:55

## 2025-06-15 RX ADMIN — ASPIRIN 324 MG: 81 TABLET, CHEWABLE ORAL at 22:48

## 2025-06-15 RX ADMIN — HYDRALAZINE HYDROCHLORIDE 10 MG: 20 INJECTION INTRAMUSCULAR; INTRAVENOUS at 23:38

## 2025-06-15 RX ADMIN — LINEZOLID 600 MG: 600 INJECTION, SOLUTION INTRAVENOUS at 21:16

## 2025-06-15 ASSESSMENT — PAIN DESCRIPTION - PAIN TYPE: TYPE: ACUTE PAIN

## 2025-06-15 ASSESSMENT — PAIN - FUNCTIONAL ASSESSMENT: PAIN_FUNCTIONAL_ASSESSMENT: 0-10

## 2025-06-15 ASSESSMENT — PAIN SCALES - GENERAL: PAINLEVEL_OUTOF10: 1

## 2025-06-15 ASSESSMENT — PAIN DESCRIPTION - ORIENTATION: ORIENTATION: RIGHT;LEFT

## 2025-06-15 ASSESSMENT — PAIN DESCRIPTION - DESCRIPTORS: DESCRIPTORS: ACHING

## 2025-06-15 NOTE — ED NOTES
Pt O2 sats below 90% on RA. This nurse asked patient if he is on O2 at home, pt endorses using home O2 \"sometimes\". This nurse placed pt on 2LPM at this time. Pt O2 sats within normal limits. Provider made aware.
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
## 2025-06-16 ENCOUNTER — APPOINTMENT (OUTPATIENT)
Facility: HOSPITAL | Age: 55
DRG: 674 | End: 2025-06-16
Payer: MEDICARE

## 2025-06-16 ENCOUNTER — APPOINTMENT (OUTPATIENT)
Facility: HOSPITAL | Age: 55
DRG: 674 | End: 2025-06-16
Attending: INTERNAL MEDICINE
Payer: MEDICARE

## 2025-06-16 PROBLEM — Z98.890: Status: ACTIVE | Noted: 2025-06-16

## 2025-06-16 LAB
ALBUMIN SERPL-MCNC: 2.3 G/DL (ref 3.4–5)
AMMONIA PLAS-SCNC: 27 UMOL/L (ref 11–60)
ANION GAP SERPL CALC-SCNC: 23 MMOL/L (ref 3–18)
APTT PPP: 44.2 SEC (ref 23–36.4)
BUN SERPL-MCNC: 96 MG/DL (ref 6–23)
BUN/CREAT SERPL: 6 (ref 12–20)
CA-I SERPL-SCNC: 0.99 MMOL/L (ref 1.12–1.32)
CALCIUM SERPL-MCNC: 8.6 MG/DL (ref 8.5–10.1)
CHLORIDE SERPL-SCNC: 97 MMOL/L (ref 98–107)
CO2 SERPL-SCNC: 20 MMOL/L (ref 21–32)
CREAT SERPL-MCNC: 16.7 MG/DL (ref 0.6–1.3)
ECHO AO ASC DIAM: 3.1 CM
ECHO AO ASCENDING AORTA INDEX: 1.71 CM/M2
ECHO AO ROOT DIAM: 3.1 CM
ECHO AO ROOT INDEX: 1.71 CM/M2
ECHO AV AREA PEAK VELOCITY: 1.6 CM2
ECHO AV AREA VTI: 1.7 CM2
ECHO AV AREA/BSA PEAK VELOCITY: 0.9 CM2/M2
ECHO AV AREA/BSA VTI: 0.9 CM2/M2
ECHO AV MEAN GRADIENT: 7 MMHG
ECHO AV MEAN VELOCITY: 1.3 M/S
ECHO AV PEAK GRADIENT: 15 MMHG
ECHO AV PEAK VELOCITY: 1.9 M/S
ECHO AV VELOCITY RATIO: 0.63
ECHO AV VTI: 45.5 CM
ECHO BSA: 1.82 M2
ECHO EST RA PRESSURE: 3 MMHG
ECHO LA DIAMETER INDEX: 2.71 CM/M2
ECHO LA DIAMETER: 4.9 CM
ECHO LA TO AORTIC ROOT RATIO: 1.58
ECHO LV EDV A2C: 121 ML
ECHO LV EDV A4C: 158 ML
ECHO LV EDV BP: 141 ML (ref 67–155)
ECHO LV EDV INDEX A4C: 87 ML/M2
ECHO LV EDV INDEX BP: 78 ML/M2
ECHO LV EDV NDEX A2C: 67 ML/M2
ECHO LV EF PHYSICIAN: 57 %
ECHO LV EJECTION FRACTION A2C: 52 %
ECHO LV EJECTION FRACTION A4C: 57 %
ECHO LV EJECTION FRACTION BIPLANE: 54 % (ref 55–100)
ECHO LV ESV A2C: 59 ML
ECHO LV ESV A4C: 68 ML
ECHO LV ESV BP: 65 ML (ref 22–58)
ECHO LV ESV INDEX A2C: 33 ML/M2
ECHO LV ESV INDEX A4C: 38 ML/M2
ECHO LV ESV INDEX BP: 36 ML/M2
ECHO LV FRACTIONAL SHORTENING: 29 % (ref 28–44)
ECHO LV INTERNAL DIMENSION DIASTOLE INDEX: 2.71 CM/M2
ECHO LV INTERNAL DIMENSION DIASTOLIC: 4.9 CM (ref 4.2–5.9)
ECHO LV INTERNAL DIMENSION SYSTOLIC INDEX: 1.93 CM/M2
ECHO LV INTERNAL DIMENSION SYSTOLIC: 3.5 CM
ECHO LV IVSD: 1.3 CM (ref 0.6–1)
ECHO LV MASS 2D: 312.9 G (ref 88–224)
ECHO LV MASS INDEX 2D: 172.9 G/M2 (ref 49–115)
ECHO LV POSTERIOR WALL DIASTOLIC: 1.7 CM (ref 0.6–1)
ECHO LV RELATIVE WALL THICKNESS RATIO: 0.69
ECHO LVOT AREA: 2.5 CM2
ECHO LVOT AV VTI INDEX: 0.65
ECHO LVOT DIAM: 1.8 CM
ECHO LVOT MEAN GRADIENT: 3 MMHG
ECHO LVOT PEAK GRADIENT: 5 MMHG
ECHO LVOT PEAK VELOCITY: 1.2 M/S
ECHO LVOT STROKE VOLUME INDEX: 41.9 ML/M2
ECHO LVOT SV: 75.8 ML
ECHO LVOT VTI: 29.8 CM
ECHO MV A VELOCITY: 0.92 M/S
ECHO MV AREA VTI: 1.5 CM2
ECHO MV E DECELERATION TIME (DT): 254.5 MS
ECHO MV E VELOCITY: 1.3 M/S
ECHO MV E/A RATIO: 1.41
ECHO MV LVOT VTI INDEX: 1.71
ECHO MV MAX VELOCITY: 1.4 M/S
ECHO MV MEAN GRADIENT: 4 MMHG
ECHO MV MEAN VELOCITY: 1 M/S
ECHO MV PEAK GRADIENT: 8 MMHG
ECHO MV VTI: 51 CM
ECHO PULMONARY ARTERY SYSTOLIC PRESSURE (PASP): 36 MMHG
ECHO RIGHT VENTRICULAR SYSTOLIC PRESSURE (RVSP): 36 MMHG
ECHO TV REGURGITANT MAX VELOCITY: 2.86 M/S
ECHO TV REGURGITANT PEAK GRADIENT: 33 MMHG
EKG ATRIAL RATE: 99 BPM
EKG DIAGNOSIS: NORMAL
EKG P AXIS: 66 DEGREES
EKG P-R INTERVAL: 162 MS
EKG Q-T INTERVAL: 392 MS
EKG QRS DURATION: 90 MS
EKG QTC CALCULATION (BAZETT): 503 MS
EKG R AXIS: 8 DEGREES
EKG T AXIS: 131 DEGREES
EKG VENTRICULAR RATE: 99 BPM
ERYTHROCYTE [DISTWIDTH] IN BLOOD BY AUTOMATED COUNT: 18.9 % (ref 11.6–14.5)
ERYTHROCYTE [DISTWIDTH] IN BLOOD BY AUTOMATED COUNT: 19.6 % (ref 11.6–14.5)
EST. AVERAGE GLUCOSE BLD GHB EST-MCNC: 103 MG/DL
FERRITIN SERPL-MCNC: 3211 NG/ML (ref 13–400)
GLUCOSE BLD STRIP.AUTO-MCNC: 101 MG/DL (ref 70–110)
GLUCOSE SERPL-MCNC: 90 MG/DL (ref 74–108)
HBA1C MFR BLD: 5.2 % (ref 4.2–5.6)
HCT VFR BLD AUTO: 20.1 % (ref 36–48)
HCT VFR BLD AUTO: 22.2 % (ref 36–48)
HCT VFR BLD AUTO: 22.4 % (ref 36–48)
HGB BLD-MCNC: 6.3 G/DL (ref 13–16)
HGB BLD-MCNC: 7.1 G/DL (ref 13–16)
HGB BLD-MCNC: 7.3 G/DL (ref 13–16)
HISTORY CHECK: NORMAL
INR PPP: 1.2 (ref 0.9–1.1)
IRON SATN MFR SERPL: 25 %
IRON SERPL-MCNC: 29 UG/DL (ref 50–175)
MCH RBC QN AUTO: 24.7 PG (ref 24–34)
MCH RBC QN AUTO: 25.3 PG (ref 24–34)
MCHC RBC AUTO-ENTMCNC: 31.3 G/DL (ref 31–37)
MCHC RBC AUTO-ENTMCNC: 32.6 G/DL (ref 31–37)
MCV RBC AUTO: 77.8 FL (ref 78–100)
MCV RBC AUTO: 78.8 FL (ref 78–100)
NRBC # BLD: 0 K/UL (ref 0–0.01)
NRBC # BLD: 0 K/UL (ref 0–0.01)
NRBC BLD-RTO: 0 PER 100 WBC
NRBC BLD-RTO: 0 PER 100 WBC
NT PRO BNP: ABNORMAL PG/ML (ref 36–900)
PHOSPHATE SERPL-MCNC: 12.2 MG/DL (ref 2.5–4.9)
PLATELET # BLD AUTO: 210 K/UL (ref 135–420)
PLATELET # BLD AUTO: 213 K/UL (ref 135–420)
PMV BLD AUTO: 10 FL (ref 9.2–11.8)
PMV BLD AUTO: 9.9 FL (ref 9.2–11.8)
POTASSIUM SERPL-SCNC: 4.8 MMOL/L (ref 3.5–5.5)
PROCALCITONIN SERPL-MCNC: 2.25 NG/ML
PROTHROMBIN TIME: 15.8 SEC (ref 11.9–14.9)
RBC # BLD AUTO: 2.55 M/UL (ref 4.35–5.65)
RBC # BLD AUTO: 2.88 M/UL (ref 4.35–5.65)
RETICS/RBC NFR AUTO: 1.3 % (ref 0.5–2.5)
SODIUM SERPL-SCNC: 139 MMOL/L (ref 136–145)
TIBC SERPL-MCNC: 113 UG/DL (ref 250–450)
TROPONIN T SERPL HS-MCNC: 108 NG/L (ref 0–22)
TROPONIN T SERPL HS-MCNC: 108 NG/L (ref 0–22)
TROPONIN T SERPL HS-MCNC: 110 NG/L (ref 0–22)
UIBC SERPL-MCNC: 84.3 UG/DL (ref 112–347)
WBC # BLD AUTO: 7 K/UL (ref 4.6–13.2)
WBC # BLD AUTO: 7.3 K/UL (ref 4.6–13.2)

## 2025-06-16 PROCEDURE — 6370000000 HC RX 637 (ALT 250 FOR IP): Performed by: INTERNAL MEDICINE

## 2025-06-16 PROCEDURE — 85027 COMPLETE CBC AUTOMATED: CPT

## 2025-06-16 PROCEDURE — 85610 PROTHROMBIN TIME: CPT

## 2025-06-16 PROCEDURE — 73030 X-RAY EXAM OF SHOULDER: CPT

## 2025-06-16 PROCEDURE — 82607 VITAMIN B-12: CPT

## 2025-06-16 PROCEDURE — 82962 GLUCOSE BLOOD TEST: CPT

## 2025-06-16 PROCEDURE — 5A1D70Z PERFORMANCE OF URINARY FILTRATION, INTERMITTENT, LESS THAN 6 HOURS PER DAY: ICD-10-PCS | Performed by: INTERNAL MEDICINE

## 2025-06-16 PROCEDURE — 2500000003 HC RX 250 WO HCPCS: Performed by: INTERNAL MEDICINE

## 2025-06-16 PROCEDURE — 82330 ASSAY OF CALCIUM: CPT

## 2025-06-16 PROCEDURE — 83540 ASSAY OF IRON: CPT

## 2025-06-16 PROCEDURE — 06HN33Z INSERTION OF INFUSION DEVICE INTO LEFT FEMORAL VEIN, PERCUTANEOUS APPROACH: ICD-10-PCS | Performed by: INTERNAL MEDICINE

## 2025-06-16 PROCEDURE — 83550 IRON BINDING TEST: CPT

## 2025-06-16 PROCEDURE — 83880 ASSAY OF NATRIURETIC PEPTIDE: CPT

## 2025-06-16 PROCEDURE — P9016 RBC LEUKOCYTES REDUCED: HCPCS

## 2025-06-16 PROCEDURE — 0JH63XZ INSERTION OF TUNNELED VASCULAR ACCESS DEVICE INTO CHEST SUBCUTANEOUS TISSUE AND FASCIA, PERCUTANEOUS APPROACH: ICD-10-PCS | Performed by: INTERNAL MEDICINE

## 2025-06-16 PROCEDURE — 80069 RENAL FUNCTION PANEL: CPT

## 2025-06-16 PROCEDURE — 85045 AUTOMATED RETICULOCYTE COUNT: CPT

## 2025-06-16 PROCEDURE — 93321 DOPPLER ECHO F-UP/LMTD STD: CPT

## 2025-06-16 PROCEDURE — 2700000000 HC OXYGEN THERAPY PER DAY

## 2025-06-16 PROCEDURE — 90935 HEMODIALYSIS ONE EVALUATION: CPT

## 2025-06-16 PROCEDURE — 36415 COLL VENOUS BLD VENIPUNCTURE: CPT

## 2025-06-16 PROCEDURE — 36430 TRANSFUSION BLD/BLD COMPNT: CPT

## 2025-06-16 PROCEDURE — 30233N1 TRANSFUSION OF NONAUTOLOGOUS RED BLOOD CELLS INTO PERIPHERAL VEIN, PERCUTANEOUS APPROACH: ICD-10-PCS | Performed by: INTERNAL MEDICINE

## 2025-06-16 PROCEDURE — 85014 HEMATOCRIT: CPT

## 2025-06-16 PROCEDURE — 82140 ASSAY OF AMMONIA: CPT

## 2025-06-16 PROCEDURE — 6360000002 HC RX W HCPCS: Performed by: INTERNAL MEDICINE

## 2025-06-16 PROCEDURE — 83036 HEMOGLOBIN GLYCOSYLATED A1C: CPT

## 2025-06-16 PROCEDURE — 6360000002 HC RX W HCPCS: Performed by: SURGERY

## 2025-06-16 PROCEDURE — 93010 ELECTROCARDIOGRAM REPORT: CPT | Performed by: INTERNAL MEDICINE

## 2025-06-16 PROCEDURE — 93306 TTE W/DOPPLER COMPLETE: CPT | Performed by: INTERNAL MEDICINE

## 2025-06-16 PROCEDURE — 2709999900 IR NONTUNNELED VASCULAR CATHETER > 5 YEARS

## 2025-06-16 PROCEDURE — 84145 PROCALCITONIN (PCT): CPT

## 2025-06-16 PROCEDURE — 1100000003 HC PRIVATE W/ TELEMETRY

## 2025-06-16 PROCEDURE — 85730 THROMBOPLASTIN TIME PARTIAL: CPT

## 2025-06-16 PROCEDURE — 85018 HEMOGLOBIN: CPT

## 2025-06-16 PROCEDURE — 82728 ASSAY OF FERRITIN: CPT

## 2025-06-16 PROCEDURE — 84484 ASSAY OF TROPONIN QUANT: CPT

## 2025-06-16 PROCEDURE — 6360000004 HC RX CONTRAST MEDICATION: Performed by: SURGERY

## 2025-06-16 RX ORDER — ACETAMINOPHEN 500 MG
500 TABLET ORAL
Status: DISCONTINUED | OUTPATIENT
Start: 2025-06-16 | End: 2025-06-21 | Stop reason: HOSPADM

## 2025-06-16 RX ORDER — ACETAMINOPHEN 325 MG/1
650 TABLET ORAL EVERY 8 HOURS PRN
Status: DISCONTINUED | OUTPATIENT
Start: 2025-06-16 | End: 2025-06-21 | Stop reason: HOSPADM

## 2025-06-16 RX ORDER — ONDANSETRON 2 MG/ML
4 INJECTION INTRAMUSCULAR; INTRAVENOUS EVERY 6 HOURS PRN
Status: DISCONTINUED | OUTPATIENT
Start: 2025-06-16 | End: 2025-06-21 | Stop reason: HOSPADM

## 2025-06-16 RX ORDER — MORPHINE SULFATE 4 MG/ML
4 INJECTION, SOLUTION INTRAMUSCULAR; INTRAVENOUS
Status: DISCONTINUED | OUTPATIENT
Start: 2025-06-16 | End: 2025-06-16 | Stop reason: CLARIF

## 2025-06-16 RX ORDER — IOPAMIDOL 612 MG/ML
INJECTION, SOLUTION INTRAVASCULAR PRN
Status: COMPLETED | OUTPATIENT
Start: 2025-06-16 | End: 2025-06-16

## 2025-06-16 RX ORDER — ACETAMINOPHEN 650 MG/1
650 SUPPOSITORY RECTAL EVERY 8 HOURS PRN
Status: DISCONTINUED | OUTPATIENT
Start: 2025-06-16 | End: 2025-06-21 | Stop reason: HOSPADM

## 2025-06-16 RX ORDER — MORPHINE SULFATE 2 MG/ML
2 INJECTION, SOLUTION INTRAMUSCULAR; INTRAVENOUS
Status: DISCONTINUED | OUTPATIENT
Start: 2025-06-16 | End: 2025-06-16 | Stop reason: CLARIF

## 2025-06-16 RX ORDER — LIDOCAINE HYDROCHLORIDE 10 MG/ML
30 INJECTION, SOLUTION EPIDURAL; INFILTRATION; INTRACAUDAL; PERINEURAL
Status: DISCONTINUED | OUTPATIENT
Start: 2025-06-16 | End: 2025-06-18 | Stop reason: SDUPTHER

## 2025-06-16 RX ORDER — OXYCODONE HYDROCHLORIDE 5 MG/1
5 TABLET ORAL
Refills: 0 | Status: DISCONTINUED | OUTPATIENT
Start: 2025-06-16 | End: 2025-06-21 | Stop reason: HOSPADM

## 2025-06-16 RX ORDER — HEPARIN SODIUM 200 [USP'U]/100ML
1000 INJECTION, SOLUTION INTRAVENOUS ONCE
Status: DISCONTINUED | OUTPATIENT
Start: 2025-06-16 | End: 2025-06-18 | Stop reason: SDUPTHER

## 2025-06-16 RX ORDER — ONDANSETRON 4 MG/1
4 TABLET, ORALLY DISINTEGRATING ORAL EVERY 8 HOURS PRN
Status: DISCONTINUED | OUTPATIENT
Start: 2025-06-16 | End: 2025-06-21 | Stop reason: HOSPADM

## 2025-06-16 RX ORDER — HYDROMORPHONE HYDROCHLORIDE 2 MG/ML
1 INJECTION, SOLUTION INTRAMUSCULAR; INTRAVENOUS; SUBCUTANEOUS
Status: DISCONTINUED | OUTPATIENT
Start: 2025-06-16 | End: 2025-06-21 | Stop reason: HOSPADM

## 2025-06-16 RX ORDER — ONDANSETRON 2 MG/ML
4 INJECTION INTRAMUSCULAR; INTRAVENOUS ONCE
Status: DISCONTINUED | OUTPATIENT
Start: 2025-06-16 | End: 2025-06-21

## 2025-06-16 RX ORDER — LINEZOLID 2 MG/ML
600 INJECTION, SOLUTION INTRAVENOUS EVERY 12 HOURS
Status: DISCONTINUED | OUTPATIENT
Start: 2025-06-16 | End: 2025-06-17

## 2025-06-16 RX ORDER — HYDRALAZINE HYDROCHLORIDE 20 MG/ML
10 INJECTION INTRAMUSCULAR; INTRAVENOUS ONCE
Status: DISCONTINUED | OUTPATIENT
Start: 2025-06-16 | End: 2025-06-21

## 2025-06-16 RX ORDER — HEPARIN SODIUM 1000 [USP'U]/ML
5000 INJECTION, SOLUTION INTRAVENOUS; SUBCUTANEOUS
Status: DISCONTINUED | OUTPATIENT
Start: 2025-06-16 | End: 2025-06-18 | Stop reason: SDUPTHER

## 2025-06-16 RX ORDER — HYDROMORPHONE HYDROCHLORIDE 2 MG/ML
2 INJECTION, SOLUTION INTRAMUSCULAR; INTRAVENOUS; SUBCUTANEOUS EVERY 4 HOURS PRN
Status: DISCONTINUED | OUTPATIENT
Start: 2025-06-16 | End: 2025-06-21 | Stop reason: HOSPADM

## 2025-06-16 RX ADMIN — HYDRALAZINE HYDROCHLORIDE 50 MG: 25 TABLET ORAL at 14:42

## 2025-06-16 RX ADMIN — ACETAMINOPHEN 500 MG: 500 TABLET ORAL at 07:51

## 2025-06-16 RX ADMIN — SODIUM CHLORIDE, PRESERVATIVE FREE 10 ML: 5 INJECTION INTRAVENOUS at 07:53

## 2025-06-16 RX ADMIN — AMLODIPINE BESYLATE 5 MG: 5 TABLET ORAL at 07:53

## 2025-06-16 RX ADMIN — HYDRALAZINE HYDROCHLORIDE 50 MG: 25 TABLET ORAL at 05:56

## 2025-06-16 RX ADMIN — LINEZOLID 600 MG: 600 INJECTION, SOLUTION INTRAVENOUS at 14:45

## 2025-06-16 RX ADMIN — LABETALOL HCL 200 MG: 200 TABLET, FILM COATED ORAL at 05:56

## 2025-06-16 RX ADMIN — OXYCODONE 5 MG: 5 TABLET ORAL at 04:24

## 2025-06-16 RX ADMIN — IOPAMIDOL 45 ML: 612 INJECTION, SOLUTION INTRAVENOUS at 11:40

## 2025-06-16 RX ADMIN — BISACODYL 5 MG: 5 TABLET, COATED ORAL at 07:52

## 2025-06-16 RX ADMIN — ACETAMINOPHEN 500 MG: 500 TABLET ORAL at 01:55

## 2025-06-16 RX ADMIN — CINACALCET HYDROCHLORIDE 90 MG: 30 TABLET, FILM COATED ORAL at 07:52

## 2025-06-16 RX ADMIN — ONDANSETRON 4 MG: 2 INJECTION, SOLUTION INTRAMUSCULAR; INTRAVENOUS at 13:48

## 2025-06-16 RX ADMIN — CALCITRIOL CAPSULES 0.25 MCG 0.5 MCG: 0.25 CAPSULE ORAL at 07:52

## 2025-06-16 RX ADMIN — ACETAMINOPHEN 500 MG: 500 TABLET ORAL at 14:42

## 2025-06-16 RX ADMIN — HYDROMORPHONE HYDROCHLORIDE 2 MG: 2 INJECTION, SOLUTION INTRAMUSCULAR; INTRAVENOUS; SUBCUTANEOUS at 01:55

## 2025-06-16 RX ADMIN — SEVELAMER CARBONATE 800 MG: 800 TABLET, FILM COATED ORAL at 07:52

## 2025-06-16 ASSESSMENT — ENCOUNTER SYMPTOMS
RESPIRATORY NEGATIVE: 1
GASTROINTESTINAL NEGATIVE: 1

## 2025-06-16 ASSESSMENT — PAIN DESCRIPTION - ORIENTATION: ORIENTATION: RIGHT;LEFT

## 2025-06-16 ASSESSMENT — PAIN DESCRIPTION - DESCRIPTORS: DESCRIPTORS: DISCOMFORT

## 2025-06-16 ASSESSMENT — PAIN DESCRIPTION - LOCATION: LOCATION: ARM;LEG

## 2025-06-16 ASSESSMENT — PAIN - FUNCTIONAL ASSESSMENT: PAIN_FUNCTIONAL_ASSESSMENT: PREVENTS OR INTERFERES SOME ACTIVE ACTIVITIES AND ADLS

## 2025-06-16 ASSESSMENT — PAIN SCALES - GENERAL
PAINLEVEL_OUTOF10: 10
PAINLEVEL_OUTOF10: 0

## 2025-06-16 NOTE — OR NURSING
Pt was educated to procedure and sedation. Pt NPO. Pt does not take anticoagulation. Pt confirms no problems with sedation in the past.

## 2025-06-16 NOTE — OP NOTE
Operative Note      Patient: Evan Huizar  YOB: 1970  MRN: [id number]    VASCULAR SURGERY OPERATIVE REPORT    DATE OF PROCEDURE: 06/16/25     PREOP DIAGNOSIS: End-stage renal disease, phlebosclerosis, bacteremia  POSTOP DIAGNOSIS: Same    PROCEDURE:    Ultrasound-guided access of right IJ, right EJ, right subclavian, left EJ vein with interpretation  Venogram via right subclavian and right IJ  Ultrasound-guided access of left CFV with interpretation'  Placement of left CFV non-tunneled dialysis catheter, 12.5Fr, 24cm  Radiographic supervision and interpretation    SURGEON: Mikaela Spears MD     ANESTHESIA:  Local with 1% lidocaine  Patient was constantly monitored.   Independent trained observer (RN/RCIS) was present during the course of the procedure.    ESTIMATED BLOOD LOSS:  minimal    CONTRAST: 20mL    FLUOROSCOPY TIME: 4min, Rads 138mGy    INDICATIONS:  Mr. Huizar is a 54 y.o. male with renal failure who needs dialysis access. Preoperatively, we explained the procedure and discussed the benefits, alternatives, and risks of surgery, including but not limited to: bleeding, infection, risk of damage to adjacent structures, failure of the operation, and the need for future procedures.  Specifically, catheter infection requiring its removal and hemothorax and/or pneumothorax requiring tube thoracostomy were discussed.  All questions were answered.    The patient and/or their representative expressed clear verbal understanding and desired that we proceed.     DESCRIPTION OF PROCEDURE:  The patient was properly identified, and after ensuring the appropriately signed documents were secured, the patient was brought to the Cath Lab and placed supine on the table.  The right neck and chest were prepped and draped in the usual sterile fashion ta the start of the procedure and during the procedure we also prepped the left neck and chest and the bilateral groins.  An appropriate timeout procedure was
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
## 2025-06-16 NOTE — PROGRESS NOTES
Care Management Assistant, assisting Care Manager Patricia Montoya RN with discharge planning needs. Referrals sent to area Home Health Agencies for review and consideration for acceptance for possible home care needs.     Will follow for further needs.     [facility] accepted patient for home care needs.    Will continue to follow for further plans.
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
## 2025-06-16 NOTE — PROGRESS NOTES
Hospitalist Progress Note      Patient: Evan Huizar MRN: [id number]  CSN: [id number]    YOB: 1970  Age: 54 y.o.  Sex: male    DOA: 6/15/2025 LOS:  LOS: 1 day      PCP: Deondre Sheets MD       Summary:      Evan Huizar is a 54 y.o. old male with PMHx of ***  who was admitted to [facility] 6/15/2025 with ***                    Assessment/Plan:    Principal Problem:    Hypertensive emergency  Active Problems:    Bacteremia, possible staph    H/O removal of dialysis catheter, accidental via pt    Non-compliance with renal dialysis    Hx of BKA, right (HCC)    ESRD (end stage renal disease) on dialysis (HCC)    Hypertension    Chronic diastolic CHF (congestive heart failure) (HCC)    Metabolic acidosis    Anemia, normocytic  Resolved Problems:    * No resolved hospital problems. *                                   Discharge to:   [] Home  []SNF/Rehab  []  Others  in  {0-10:01229} Days, []  stable  for DC  DVT Prophylaxis:   []Lovenox  []Hep SQ  []SCDs  []Coumadin, Eliquis, Xarelto, Pradaxa   []On Heparin gtt. [] No indication [] Refused  Case discussed with:   []Patient  []Family  []Nursing  []Case Management [] Consultants        Review of systems:  10 systems reviewed, all negative other than what is mentioned above.        Objective:  BP (!) 182/83   Pulse 72   Temp 98.7 °F (37.1 °C) (Oral)   Resp 18   Ht 1.702 m (5' 7\")   Wt 70 kg (154 lb 5.2 oz)   SpO2 99%   BMI 24.17 kg/m²   Last three shifts:  No intake/output data recorded.  General: Alert, NAD, AOX3 ***  CVS: Regular rate and rhythm, no m/c/r, S1/S2    ***  Lungs: Clear to auscultation bilaterally, no wheezes, rhonchi, or rales ***  Abdomen: Soft, Nontender, No distention, Normal Bowel sounds  ***  Extremities: No c/c/e     Neuro:grossly non-focal neurologic exam, follows commands        Intake/Output  In: 429.2 [Blood:429.2]  Out: -        Pertinent Labs:    No results found for the last 90

## 2025-06-16 NOTE — PROGRESS NOTES
Moderate Risk Nutrition Assessment Initial    Type and Reason for Visit: Initial, Wound    Nutrition Recommendations/Plan:   Consider removing pro restriction increased pro needs given pro losses w/HD  Monitor need for ONS if with poor PO  Consider adding Mary-mansoor daily   Monitor Ca, Phos, consider check PTH - adjust Calcitriol, Sensipar, Renvela as needed - defer to MD  Check wound consult   Cont to monitor wt trends, lytes, UOP, bowel fx, skin integrity, and adjust recs as needed     Malnutrition Assessment:  Malnutrition Status: At risk for malnutrition    Nutrition Assessment:  53yo M with missing TDC, last HD Wed 6/11, ESRD-MWF, R BKA/PAD, HFpEF, anemia, HTN, DM2, last  replaced 4/29/25 at [facility], s/p temp HD cath 6/16 today, h/o positive cx swab at TDC site positive blood cx for staph epi at HD 6/11 after yellow drainage noted cath site per MD. alert. on DM/low K/low pro diet. EDW 70kg 6/4/25 also appears to be stated admit wt 70kg. variable wt hx trends likely fluid. pending wound consult to eval L foot dm ulcer. on Calcitriol, sensipar, and renvela. K 4.8, Ca 8.6, Phos 12.2.    Estimated Daily Nutrient Needs:  Energy (kcal):  2000 Weight Used for Energy Requirements: Other     Protein (g):  90 Weight Used for Protein Requirements: Other        Fluid (ml/day):  2000 - defer to MD given on dialysis Method Used for Fluid Requirements: 1 ml/kcal    Nutrition Related Findings:     Wound Type: Wound Consult Pending, Diabetic Ulcer    Current Nutrition Therapies:    ADULT DIET; Regular; 4 carb choices (60 gm/meal); Low Potassium (Less than 3000 mg/day); Less than 60 gm    Anthropometric Measures:  Height: 170.2 cm (5' 7.01\")  Current Body Wt: 70 kg (154 lb 5.2 oz)   BMI: 24.2 vs BMI 25.6 adjusted for R BKA    Nutrition Diagnosis:   Increased nutrient needs, in context of chronic illness, Altered nutrition-related lab values related to cardiac dysfunction, renal dysfunction, increase demand for energy/nutrients

## 2025-06-16 NOTE — CARE COORDINATION
06/16/25 0816   Service Assessment   Patient Orientation Alert and Oriented;Person;Place;Situation;Self   Cognition Alert   History Provided By Patient   Primary Caregiver Self   Accompanied By/Relationship N/A   Support Systems Friends/Neighbors;Family Members   Patient's Healthcare Decision Maker is: Named in Scanned ACP Document   PCP Verified by CM Yes   Last Visit to PCP Within last 6 months   Prior Functional Level Independent in ADLs/IADLs   Current Functional Level Assistance with the following:;Cooking;Housework;Shopping;Toileting   Can patient return to prior living arrangement Yes   Ability to make needs known: Good   Family able to assist with home care needs: Yes   Would you like for me to discuss the discharge plan with any other family members/significant others, and if so, who? Yes  (Can discuss with borther and ex-wife)   Financial Resources Medicare   Community Resources None   Social/Functional History   Lives With Alone   Type of Home Apartment   Bathroom Accessibility Accessible   Receives Help From Family;Friend(s)   Prior Level of Assist for ADLs Independent   Prior Level of Assist for Homemaking Independent   Homemaking Responsibilities Yes   Ambulation Assistance Independent   Prior Level of Assist for Transfers Independent   Active  Yes   Mode of Transportation Car   Occupation Unemployed   Discharge Planning   Type of Residence Apartment   Living Arrangements Alone   Current Services Prior To Admission Oxygen Therapy   Potential Assistance Needed N/A   DME Ordered? No   Potential Assistance Purchasing Medications No   Type of Home Care Services PT;OT;Nursing Services   Patient expects to be discharged to: Apartment   Follow Up Appointment: Best Day/Time  Monday AM   One/Two Story Residence One story   History of falls? 1   Services At/After Discharge   Transition of Care Consult (CM Consult) Home Health   Internal Home Health Yes   Services At/After Discharge Home Health   [place]
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
## 2025-06-16 NOTE — CONSENT
Informed Consent for Blood Component Transfusion Note    I have discussed with the patient the rationale for blood component transfusion; its benefits in treating or preventing fatigue, organ damage, or death; and its risk which includes mild transfusion reactions, rare risk of blood borne infection, or more serious but rare reactions. I have discussed the alternatives to transfusion, including the risk and consequences of not receiving transfusion. The patient had an opportunity to ask questions and had agreed to proceed with transfusion of blood components.    Electronically signed by Ham Prince DO, PhD on 6/15/25 at 10:01 PM EDT
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
## 2025-06-16 NOTE — PROGRESS NOTES
Care Mgmt Assistant, assisting Care Mgr Patricia Montoya RN with Discharge Planning needs for patient.  Updates sent to [facility] (patient goes to [facility] Mon-Wed-Fri for treatment) for review.    Will follow along for further needs.

## 2025-06-16 NOTE — CONSENT
Informed Consent for Blood Component Transfusion Note    I have discussed with the patient the rationale for blood component transfusion; its benefits in treating or preventing fatigue, organ damage, or death; and its risk which includes mild transfusion reactions, rare risk of blood borne infection, or more serious but rare reactions. I have discussed the alternatives to transfusion, including the risk and consequences of not receiving transfusion. The patients had an opportunity to ask questions and had agreed to proceed with transfusion of blood components.    Electronically signed by Jenny Karimi MD on 6/16/25 at 9:22 AM EDT

## 2025-06-16 NOTE — CONSULTS
RENAL CONSULT  2025    Patient:  Evan Huizar  :  1970  Gender:  male  MRN #:  [id number]    Reason for Consult: bacteremia and ESRD related care     History of Present Illness:    Evan Huizar is a 54 y.o. year old male  with  ESRD on HD,  DM-2, anemia, uncontrolled hypertension , hyperphosphatemia,  non compliant to dialysis prescription, dietary/life style modification came to hospital emergency as TDC fell  off    Wound culture and blood culture was sent from dialysis unit on  both grew staph epidermidis   When I saw him this afternoon he says he is doing okay. No fever , chills or dyspnea  No nausea and vomiting     Past Medical History:   Diagnosis Date    (HFpEF) heart failure with preserved ejection fraction (HCC)     Chronic disease anemia     Chronic ulcer of left foot with fat layer exposed (HCC)     Diabetes (HCC)     ESRD (end stage renal disease) on dialysis (HCC)     Heart failure (HCC)     Hemodialysis patient     Hx of BKA, right (HCC)     Hypertension     Medically noncompliant     Perineal abscess 2024    Psychiatric illness 2023    Sickle cell trait      Past Surgical History:   Procedure Laterality Date    IR NONTUNNELED VASCULAR CATHETER > 5 YEARS  2022    IR NONTUNNELED VASCULAR CATHETER 2022 [facility]    IR NONTUNNELED VASCULAR CATHETER > 5 YEARS  2022    IR NONTUNNELED VASCULAR CATHETER > 5 YEARS  2023    IR NONTUNNELED VASCULAR CATHETER 2023 [facility]    IR THRMB/INFUSION DIALYSIS CIRCUIT  6/3/2022    IR THRMB/INFUSION DIAYSIS CIRCUIT 6/3/2022 [facility]    IR THRMB/INFUSION DIALYSIS CIRCUIT  6/3/2022    IR TUNNELED CVC PLACE WO SQ PORT/PUMP > 5 YEARS  2022    IR TUNNELED CATHETER PLACEMENT GREATER THAN 5 YEARS 2022 [facility]    IR TUNNELED CVC PLACE WO SQ PORT/PUMP > 5 YEARS  2022    IR TUNNELED CVC PLACE WO SQ PORT/PUMP > 5 YEARS  2023    IR TUNNELED CATHETER PLACEMENT GREATER THAN 5 YEARS  2/7/2023 [facility] RAD [facility]IO IR    IR TUNNELED CVC PLACE WO SQ PORT/PUMP > 5 YEARS  7/21/2023    IR TUNNELED CATHETER PLACEMENT GREATER THAN 5 YEARS 7/21/2023 Mora Chowdary MD [facility] RAD ANGIO IR    ORTHOPEDIC SURGERY      right BKA 2017    RECTAL SURGERY N/A 12/31/2024    RECTAL PERIRECTAL INCISION AND DRAINAGE performed by Pavan De La Torre MD at [facility] MAIN OR    XR MIDLINE EQUAL OR GREATER THAN 5 YEARS  9/4/2019    XR MIDLINE EQUAL OR GREATER THAN 5 YEARS 9/4/2019     Family History   Problem Relation Age of Onset    Sickle Cell Anemia Mother     Diabetes Father      Allergies   Allergen Reactions    Vancomycin Other (See Comments)     Generalized desquamation - body-wide; not Red-Man    Penicillins Rash     Current Facility-Administered Medications   Medication Dose Route Frequency Provider Last Rate Last Admin    acetaminophen (TYLENOL) tablet 500 mg  500 mg Oral TID WC Ham Prince DO   500 mg at 06/16/25 0751    acetaminophen (TYLENOL) tablet 650 mg  650 mg Oral Q8H PRN Ham Prince DO        Or    acetaminophen (TYLENOL) suppository 650 mg  650 mg Rectal Q8H PRN Ham Prince DO        oxyCODONE (ROXICODONE) immediate release tablet 5 mg  5 mg Oral Q3H PRN Ham Prince DO   5 mg at 06/16/25 0424    HYDROmorphone (DILAUDID) injection 1 mg  1 mg IntraVENous Q3H PRN Ham Prince DO        And    HYDROmorphone HCl PF (DILAUDID) injection 2 mg  2 mg IntraVENous Q4H PRN Ham Prince DO   2 mg at 06/16/25 0155    hydrALAZINE (APRESOLINE) injection 10 mg  10 mg IntraVENous Once Jenny Karimi MD        heparin (porcine) injection 5,000 Units  5,000 Units IntraCATHeter ONCE PRN Mikaela Spears MD        heparin 2 units/mL solution in 0.9% sodium chloride  1,000 Units Irrigation Once Mikaela Spears MD        lidocaine PF 1 % injection 30 mL  30 mL SubCUTAneous ONCE PRN Mikaela Spears MD        linezolid (ZYVOX) IVPB 600 mg  600 mg IntraVENous Q12H Jenny Karimi MD
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
## 2025-06-16 NOTE — PRE SEDATION
Sedation Plan  ASA: class 4 - patient with severe systemic disease that is a constant threat to life     Mallampati class: II - soft palate, uvula, fauces visible.    Sedation plan: local anesthesia    Risks, benefits, and alternatives discussed with patient.        Immediate reassessment prior to sedation:  Patient's status reviewed and vital signs assessed; acceptable to perform procedure and proceed to administer sedation as planned.
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
## 2025-06-17 ENCOUNTER — APPOINTMENT (OUTPATIENT)
Facility: HOSPITAL | Age: 55
DRG: 674 | End: 2025-06-17
Payer: MEDICARE

## 2025-06-17 LAB
ABO + RH BLD: NORMAL
ALBUMIN SERPL-MCNC: 2.2 G/DL (ref 3.4–5)
ANION GAP SERPL CALC-SCNC: 15 MMOL/L (ref 3–18)
BLD PROD TYP BPU: NORMAL
BLOOD BANK BLOOD PRODUCT EXPIRATION DATE: NORMAL
BLOOD BANK DISPENSE STATUS: NORMAL
BLOOD BANK ISBT PRODUCT BLOOD TYPE: 5100
BLOOD BANK PRODUCT CODE: NORMAL
BLOOD BANK UNIT TYPE AND RH: NORMAL
BLOOD GROUP ANTIBODIES SERPL: NORMAL
BPU ID: NORMAL
BUN SERPL-MCNC: 39 MG/DL (ref 6–23)
BUN/CREAT SERPL: 5 (ref 12–20)
CALCIUM SERPL-MCNC: 8.5 MG/DL (ref 8.5–10.1)
CALLED TO: NORMAL
CHLORIDE SERPL-SCNC: 96 MMOL/L (ref 98–107)
CO2 SERPL-SCNC: 23 MMOL/L (ref 21–32)
CREAT SERPL-MCNC: 8.13 MG/DL (ref 0.6–1.3)
CROSSMATCH RESULT: NORMAL
ERYTHROCYTE [DISTWIDTH] IN BLOOD BY AUTOMATED COUNT: 18.6 % (ref 11.6–14.5)
GLUCOSE BLD STRIP.AUTO-MCNC: 123 MG/DL (ref 70–110)
GLUCOSE SERPL-MCNC: 94 MG/DL (ref 74–108)
HCT VFR BLD AUTO: 23.3 % (ref 36–48)
HGB BLD-MCNC: 7.5 G/DL (ref 13–16)
INTERPRETATION: NORMAL
MCH RBC QN AUTO: 25.1 PG (ref 24–34)
MCHC RBC AUTO-ENTMCNC: 32.2 G/DL (ref 31–37)
MCV RBC AUTO: 77.9 FL (ref 78–100)
NRBC # BLD: 0 K/UL (ref 0–0.01)
NRBC BLD-RTO: 0 PER 100 WBC
NT PRO BNP: ABNORMAL PG/ML (ref 36–900)
PHOSPHATE SERPL-MCNC: 5.6 MG/DL (ref 2.5–4.9)
PLATELET # BLD AUTO: 237 K/UL (ref 135–420)
PMV BLD AUTO: 12.2 FL (ref 9.2–11.8)
POTASSIUM SERPL-SCNC: 3.9 MMOL/L (ref 3.5–5.5)
RBC # BLD AUTO: 2.99 M/UL (ref 4.35–5.65)
SODIUM SERPL-SCNC: 134 MMOL/L (ref 136–145)
SPECIMEN EXP DATE BLD: NORMAL
UNIT DIVISION: 0
UNIT ISSUE DATE/TIME: NORMAL
VIT B12 SERPL-MCNC: 636 PG/ML (ref 232–1245)
WBC # BLD AUTO: 7 K/UL (ref 4.6–13.2)

## 2025-06-17 PROCEDURE — 36415 COLL VENOUS BLD VENIPUNCTURE: CPT

## 2025-06-17 PROCEDURE — 85027 COMPLETE CBC AUTOMATED: CPT

## 2025-06-17 PROCEDURE — 6360000002 HC RX W HCPCS: Performed by: INTERNAL MEDICINE

## 2025-06-17 PROCEDURE — 6370000000 HC RX 637 (ALT 250 FOR IP): Performed by: INTERNAL MEDICINE

## 2025-06-17 PROCEDURE — 80069 RENAL FUNCTION PANEL: CPT

## 2025-06-17 PROCEDURE — 2500000003 HC RX 250 WO HCPCS: Performed by: INTERNAL MEDICINE

## 2025-06-17 PROCEDURE — 70551 MRI BRAIN STEM W/O DYE: CPT

## 2025-06-17 PROCEDURE — 36556 INSERT NON-TUNNEL CV CATH: CPT

## 2025-06-17 PROCEDURE — 83880 ASSAY OF NATRIURETIC PEPTIDE: CPT

## 2025-06-17 PROCEDURE — 82962 GLUCOSE BLOOD TEST: CPT

## 2025-06-17 PROCEDURE — 1100000003 HC PRIVATE W/ TELEMETRY

## 2025-06-17 PROCEDURE — 6360000004 HC RX CONTRAST MEDICATION: Performed by: INTERNAL MEDICINE

## 2025-06-17 PROCEDURE — 70450 CT HEAD/BRAIN W/O DYE: CPT

## 2025-06-17 PROCEDURE — 74177 CT ABD & PELVIS W/CONTRAST: CPT

## 2025-06-17 PROCEDURE — 6360000002 HC RX W HCPCS: Performed by: SURGERY

## 2025-06-17 RX ORDER — IOPAMIDOL 612 MG/ML
100 INJECTION, SOLUTION INTRAVASCULAR
Status: COMPLETED | OUTPATIENT
Start: 2025-06-17 | End: 2025-06-17

## 2025-06-17 RX ORDER — METOCLOPRAMIDE HYDROCHLORIDE 5 MG/ML
5 INJECTION INTRAMUSCULAR; INTRAVENOUS
Status: DISCONTINUED | OUTPATIENT
Start: 2025-06-17 | End: 2025-06-19

## 2025-06-17 RX ADMIN — CALCITRIOL CAPSULES 0.25 MCG 0.5 MCG: 0.25 CAPSULE ORAL at 09:17

## 2025-06-17 RX ADMIN — LINEZOLID 600 MG: 600 INJECTION, SOLUTION INTRAVENOUS at 01:04

## 2025-06-17 RX ADMIN — BISACODYL 5 MG: 5 TABLET, COATED ORAL at 09:15

## 2025-06-17 RX ADMIN — AMLODIPINE BESYLATE 5 MG: 5 TABLET ORAL at 01:00

## 2025-06-17 RX ADMIN — SEVELAMER CARBONATE 800 MG: 800 TABLET, FILM COATED ORAL at 17:36

## 2025-06-17 RX ADMIN — IOPAMIDOL 75 ML: 612 INJECTION, SOLUTION INTRAVENOUS at 18:47

## 2025-06-17 RX ADMIN — ONDANSETRON 4 MG: 2 INJECTION, SOLUTION INTRAMUSCULAR; INTRAVENOUS at 13:07

## 2025-06-17 RX ADMIN — ACETAMINOPHEN 500 MG: 500 TABLET ORAL at 17:36

## 2025-06-17 RX ADMIN — METOCLOPRAMIDE HYDROCHLORIDE 5 MG: 5 INJECTION, SOLUTION INTRAMUSCULAR; INTRAVENOUS at 20:48

## 2025-06-17 RX ADMIN — LABETALOL HCL 200 MG: 200 TABLET, FILM COATED ORAL at 06:19

## 2025-06-17 RX ADMIN — HYDRALAZINE HYDROCHLORIDE 50 MG: 25 TABLET ORAL at 17:36

## 2025-06-17 RX ADMIN — LINEZOLID 600 MG: 600 INJECTION, SOLUTION INTRAVENOUS at 11:00

## 2025-06-17 RX ADMIN — CARBAMIDE PEROXIDE 6.5% 5 DROP: 6.5 LIQUID AURICULAR (OTIC) at 09:20

## 2025-06-17 RX ADMIN — SODIUM CHLORIDE, PRESERVATIVE FREE 10 ML: 5 INJECTION INTRAVENOUS at 09:17

## 2025-06-17 RX ADMIN — ACETAMINOPHEN 500 MG: 500 TABLET ORAL at 09:16

## 2025-06-17 RX ADMIN — AMLODIPINE BESYLATE 5 MG: 5 TABLET ORAL at 20:42

## 2025-06-17 RX ADMIN — SODIUM CHLORIDE, PRESERVATIVE FREE 10 ML: 5 INJECTION INTRAVENOUS at 20:50

## 2025-06-17 RX ADMIN — SODIUM CHLORIDE, PRESERVATIVE FREE 10 ML: 5 INJECTION INTRAVENOUS at 01:00

## 2025-06-17 RX ADMIN — CARBAMIDE PEROXIDE 6.5% 5 DROP: 6.5 LIQUID AURICULAR (OTIC) at 01:22

## 2025-06-17 RX ADMIN — LABETALOL HCL 200 MG: 200 TABLET, FILM COATED ORAL at 01:00

## 2025-06-17 RX ADMIN — METOCLOPRAMIDE HYDROCHLORIDE 5 MG: 5 INJECTION, SOLUTION INTRAMUSCULAR; INTRAVENOUS at 16:07

## 2025-06-17 RX ADMIN — HYDRALAZINE HYDROCHLORIDE 50 MG: 25 TABLET ORAL at 06:18

## 2025-06-17 RX ADMIN — CINACALCET HYDROCHLORIDE 90 MG: 30 TABLET, FILM COATED ORAL at 09:15

## 2025-06-17 RX ADMIN — CARBAMIDE PEROXIDE 6.5% 5 DROP: 6.5 LIQUID AURICULAR (OTIC) at 20:43

## 2025-06-17 RX ADMIN — LABETALOL HCL 200 MG: 200 TABLET, FILM COATED ORAL at 20:41

## 2025-06-17 RX ADMIN — AMLODIPINE BESYLATE 5 MG: 5 TABLET ORAL at 09:17

## 2025-06-17 ASSESSMENT — PAIN SCALES - GENERAL
PAINLEVEL_OUTOF10: 0

## 2025-06-17 NOTE — PROGRESS NOTES
Received pre HD report from ANA Hutson RN.      Pt in bed, A+O x4, no s/s of distress noted. On room air. With facial edema noted    Accessed newly inserted left femoral trialysis catheter w/o complications.  Tx initiated at 2016.      CVC flowing with ease.  For hemodynamic stability UF goal 3500 ml.  Offered assistance with repositioning every 2 hours.  Vascular access visible at all times during treatment, line connections intact at all times.      Tx completed at 2350, tolerated well 3L net UF removed.  De-accessed per protocol.    Heparin indwell 1.2ml in arterial, and 1.2ml in venous catheter.      Unit nurse ANA Hutson, RN given report. No s/s of distress.

## 2025-06-17 NOTE — PROGRESS NOTES
RENAL CONSULT PROGRESS NOTE   2025    Patient:  Evan Huizar  :  1970  Gender:  male  MRN #:  [id number]    Reason for Consult: bacteremia and ESRD related care     Subjective;   Feels okay  Denied fever or chills  No dyspnea and Bp fairly stable     Objective:    /64   Pulse 74   Temp 98.2 °F (36.8 °C) (Oral)   Resp 16   Ht 1.702 m (5' 7.01\")   Wt 75.2 kg (165 lb 12.6 oz)   SpO2 92%   BMI 25.96 kg/m²     Physical Exam:    Pt awake,  alert and comfortable  Lung: clear to auscultation  CNS- Oriented to time , place and person     Laboratory Data:    Lab Results   Component Value Date    BUN 39 (H) 2025    BUN 96 (H) 2025    BUN 98 (H) 06/15/2025     (L) 2025     2025     06/15/2025    CO2 23 2025    CO2 20 (L) 2025    CO2 18 (L) 06/15/2025    GLUCOSE 94 2025    GLUCOSE 90 2025    GLUCOSE 74 06/15/2025     Lab Results   Component Value Date    WBC 7.0 2025    HGB 7.5 (L) 2025    HCT 23.3 (L) 2025     Lab Results   Component Value Date    CALCIUM 8.5 2025     Lab Results   Component Value Date    HDL 62 (H) 2022     No results found for: \"TURBIDITY\"    Imaging Reveiwed:      EKG  Renal ultrasound  Xray chest-    IMPRESSION:  1. Enlarged cardiac silhouette, otherwise no acute disease      Assessment:  Evan Huizar is a 54 y.o. year old male  with  ESRD on HD,  DM-2, anemia, uncontrolled hypertension , hyperphosphatemia,  non compliant to dialysis prescription, dietary/life style modification came to hospital emergency as TDC fell  off    Wound culture and blood culture was sent from dialysis unit on  both grew staph epidermidis   Details in in patient chart     ESRD  Non compliant   Uncontrolled hypertension   Hyperphosphatemia   Bacteremia         Plan:    No clinical and metabolic indication of dialysis today   Next HD tomorrow, repeat blood cx on 6/15/25 is so far negative ,   Likely TDC in

## 2025-06-17 NOTE — WOUND CARE
Patient seen for assessment of left diabetic foot wound.  Patient has not wounds on his foot.Consult wound care if advanced wound care is needed.

## 2025-06-17 NOTE — PLAN OF CARE
Problem: Chronic Conditions and Co-morbidities  Goal: Patient's chronic conditions and co-morbidity symptoms are monitored and maintained or improved  6/17/2025 1205 by Cory Castillo RN  Outcome: Progressing  Flowsheets (Taken 6/16/2025 2000 by Tevin Hutson RN)  Care Plan - Patient's Chronic Conditions and Co-Morbidity Symptoms are Monitored and Maintained or Improved:   Monitor and assess patient's chronic conditions and comorbid symptoms for stability, deterioration, or improvement   Collaborate with multidisciplinary team to address chronic and comorbid conditions and prevent exacerbation or deterioration   Update acute care plan with appropriate goals if chronic or comorbid symptoms are exacerbated and prevent overall improvement and discharge  6/17/2025 0334 by Tevin Hutson RN  Outcome: Progressing     Problem: Discharge Planning  Goal: Discharge to home or other facility with appropriate resources  6/17/2025 1205 by Cory Castillo RN  Outcome: Progressing  Flowsheets (Taken 6/16/2025 2000 by Tevin Hutson RN)  Discharge to home or other facility with appropriate resources:   Identify barriers to discharge with patient and caregiver   Identify discharge learning needs (meds, wound care, etc)  6/17/2025 0334 by Tevin Hutson RN  Outcome: Progressing  Flowsheets (Taken 6/16/2025 2000)  Discharge to home or other facility with appropriate resources:   Identify barriers to discharge with patient and caregiver   Identify discharge learning needs (meds, wound care, etc)     Problem: Pain  Goal: Verbalizes/displays adequate comfort level or baseline comfort level  6/17/2025 1205 by Cory Castillo RN  Outcome: Progressing  Flowsheets (Taken 6/16/2025 2000 by Tevin Hutson RN)  Verbalizes/displays adequate comfort level or baseline comfort level:   Encourage patient to monitor pain and request assistance   Assess pain using appropriate pain scale   Implement non-pharmacological measures as appropriate and evaluate

## 2025-06-17 NOTE — PRE-PROCEDURE INSTRUCTIONS
Spoke to patient's RN -- informed patient on planned procedure for tomorrow AM. Patient to be NPO at midnight.     Dialysis updated on patient's planned procedure due to patient not being available for a duration of time tomorrow.

## 2025-06-17 NOTE — PROGRESS NOTES
VASCULAR SURGERY PROGRESS NOTE    Update:  Blood cultures from 7/15 NGTD.   Plan for TDC placement tomorrow.     GIOVANA MORENO,   06/17/25  2:52 PM
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
## 2025-06-17 NOTE — PROGRESS NOTES
12pm medication held r/t nausea with s,all amount of emesis noted, /71 at the time of medication pass, zofran admin. R/t n/v, RN to re-eval and retime medications accordingly

## 2025-06-17 NOTE — WOUND CARE
Inpatient Wound Care Note:     Evan Huizar  MEDICAL RECORD NUMBER:  [id number]  AGE: 54 y.o.   GENDER: male  : 1970  TODAY'S DATE:  2025    [] Follow-up visit for   [x] New consult for back wound  Seen in  with assistance from primary wound  Patient admitted from home    PAST MEDICAL HISTORY    Past Medical History:   Diagnosis Date    (HFpEF) heart failure with preserved ejection fraction (HCC)     Chronic disease anemia     Chronic ulcer of left foot with fat layer exposed (HCC)     Diabetes (HCC)     ESRD (end stage renal disease) on dialysis (HCC)     Heart failure (HCC)     Hemodialysis patient     Hx of BKA, right (HCC)     Hypertension     Medically noncompliant     Perineal abscess 2024    Psychiatric illness 2023    Sickle cell trait         PAST SURGICAL HISTORY    Past Surgical History:   Procedure Laterality Date    IR NONTUNNELED VASCULAR CATHETER > 5 YEARS  2022    IR NONTUNNELED VASCULAR CATHETER 2022 [facility]    IR NONTUNNELED VASCULAR CATHETER > 5 YEARS  2022    IR NONTUNNELED VASCULAR CATHETER > 5 YEARS  2023    IR NONTUNNELED VASCULAR CATHETER 2023 [facility]    IR NONTUNNELED VASCULAR CATHETER > 5 YEARS  2025    IR NONTUNNELED VASCULAR CATHETER > 5 YEARS 2025 Mikaela Spears MD [facility] RAD [facility]GIO IR    IR THRMB/INFUSION DIALYSIS CIRCUIT  6/3/2022    IR THRMB/INFUSION DIAYSIS CIRCUIT 6/3/2022 [facility]    IR THRMB/INFUSION DIALYSIS CIRCUIT  6/3/2022    IR TUNNELED CVC PLACE WO SQ PORT/PUMP > 5 YEARS  2022    IR TUNNELED CATHETER PLACEMENT GREATER THAN 5 YEARS 2022 [facility]    IR TUNNELED CVC PLACE WO SQ PORT/PUMP > 5 YEARS  2022    IR TUNNELED CVC PLACE WO SQ PORT/PUMP > 5 YEARS  2023    IR TUNNELED CATHETER PLACEMENT GREATER THAN 5 YEARS 2023 [facility]    IR TUNNELED CVC PLACE WO SQ PORT/PUMP > 5 YEARS  2023    IR TUNNELED CATHETER PLACEMENT GREATER THAN 5 YEARS 2023

## 2025-06-17 NOTE — PLAN OF CARE
Problem: Chronic Conditions and Co-morbidities  Goal: Patient's chronic conditions and co-morbidity symptoms are monitored and maintained or improved  6/17/2025 0334 by Tevin Hutson RN  Outcome: Progressing  6/16/2025 2022 by Hailey Pimentel RN  Outcome: Progressing  Flowsheets (Taken 6/16/2025 2000 by Tevin Hutson RN)  Care Plan - Patient's Chronic Conditions and Co-Morbidity Symptoms are Monitored and Maintained or Improved:   Monitor and assess patient's chronic conditions and comorbid symptoms for stability, deterioration, or improvement   Collaborate with multidisciplinary team to address chronic and comorbid conditions and prevent exacerbation or deterioration   Update acute care plan with appropriate goals if chronic or comorbid symptoms are exacerbated and prevent overall improvement and discharge  6/16/2025 1855 by Cory Castillo RN  Outcome: Progressing     Problem: Discharge Planning  Goal: Discharge to home or other facility with appropriate resources  6/17/2025 0334 by Tevin Hutson RN  Outcome: Progressing  Flowsheets (Taken 6/16/2025 2000)  Discharge to home or other facility with appropriate resources:   Identify barriers to discharge with patient and caregiver   Identify discharge learning needs (meds, wound care, etc)  6/16/2025 1855 by Cory Castillo RN  Outcome: Progressing     Problem: Pain  Goal: Verbalizes/displays adequate comfort level or baseline comfort level  6/17/2025 0334 by Tevin Hutson RN  Outcome: Progressing  Flowsheets (Taken 6/16/2025 2000)  Verbalizes/displays adequate comfort level or baseline comfort level:   Encourage patient to monitor pain and request assistance   Assess pain using appropriate pain scale   Implement non-pharmacological measures as appropriate and evaluate response   Administer analgesics based on type and severity of pain and evaluate response  6/16/2025 1855 by Cory Castillo RN  Outcome: Progressing     Problem: Safety - Adult  Goal: Free from fall

## 2025-06-17 NOTE — PLAN OF CARE
INTERVENTION:  HEMODYNAMIC STABILIZATION  MAINTAIN BP WNL WHILE ON HD.    INTERVENTION:  FLUID MANAGEMENT  WILL ATTEMPT 3500 ML TOTAL FLUID REMOVAL AS TOLERATED.    INTERVENTION:  METABOLIC/ELECTROLYTE MANAGEMENT  2.0 POTASSIUM 2.5 CALCIUM DIALYSATE USED WITH HD TODAY.    INTERVENTION:  HEMODIALYSIS ACCESS SITE MANAGEMENT  LEFT FEMORAL TRIALYSIS CATHETER ACCESSED USING ASEPTIC TECHNIQUE.    GOAL:  SIGNS AND SYMPTOMS OF LISTED POTENTIAL PROBLEMS WILL BE ABSENT OR MANAGEABLE.    OUTCOME:  PROGRESSING.    HD PLANNED FOR 3.5 HOURS TODAY.  Problem: Chronic Conditions and Co-morbidities  Goal: Patient's chronic conditions and co-morbidity symptoms are monitored and maintained or improved  6/16/2025 2022 by Hailey Pimentel, RN  Outcome: Progressing

## 2025-06-18 ENCOUNTER — HOSPITAL ENCOUNTER (INPATIENT)
Facility: HOSPITAL | Age: 55
Discharge: HOME | DRG: 674 | End: 2025-06-21
Attending: SURGERY
Payer: MEDICARE

## 2025-06-18 VITALS
RESPIRATION RATE: 10 BRPM | HEART RATE: 66 BPM | OXYGEN SATURATION: 100 % | SYSTOLIC BLOOD PRESSURE: 157 MMHG | DIASTOLIC BLOOD PRESSURE: 71 MMHG

## 2025-06-18 LAB
ALBUMIN SERPL-MCNC: 2.3 G/DL (ref 3.4–5)
ANION GAP SERPL CALC-SCNC: 15 MMOL/L (ref 3–18)
BUN SERPL-MCNC: 46 MG/DL (ref 6–23)
BUN/CREAT SERPL: 5 (ref 12–20)
CALCIUM SERPL-MCNC: 7.8 MG/DL (ref 8.5–10.1)
CHLORIDE SERPL-SCNC: 96 MMOL/L (ref 98–107)
CK SERPL-CCNC: 43 U/L (ref 39–308)
CO2 SERPL-SCNC: 23 MMOL/L (ref 21–32)
CREAT SERPL-MCNC: 9.08 MG/DL (ref 0.6–1.3)
ERYTHROCYTE [DISTWIDTH] IN BLOOD BY AUTOMATED COUNT: 18.9 % (ref 11.6–14.5)
GLUCOSE BLD STRIP.AUTO-MCNC: 105 MG/DL (ref 70–110)
GLUCOSE BLD STRIP.AUTO-MCNC: 77 MG/DL (ref 70–110)
GLUCOSE BLD STRIP.AUTO-MCNC: 87 MG/DL (ref 70–110)
GLUCOSE BLD STRIP.AUTO-MCNC: 92 MG/DL (ref 70–110)
GLUCOSE SERPL-MCNC: 74 MG/DL (ref 74–108)
HCT VFR BLD AUTO: 23.5 % (ref 36–48)
HGB BLD-MCNC: 7.5 G/DL (ref 13–16)
LACTATE SERPL-SCNC: 1.1 MMOL/L (ref 0.4–2)
MCH RBC QN AUTO: 25.3 PG (ref 24–34)
MCHC RBC AUTO-ENTMCNC: 31.9 G/DL (ref 31–37)
MCV RBC AUTO: 79.1 FL (ref 78–100)
NRBC # BLD: 0 K/UL (ref 0–0.01)
NRBC BLD-RTO: 0 PER 100 WBC
NT PRO BNP: ABNORMAL PG/ML (ref 36–900)
PHOSPHATE SERPL-MCNC: 7 MG/DL (ref 2.5–4.9)
PLATELET # BLD AUTO: 235 K/UL (ref 135–420)
PMV BLD AUTO: 11.2 FL (ref 9.2–11.8)
POTASSIUM SERPL-SCNC: 4.7 MMOL/L (ref 3.5–5.5)
RBC # BLD AUTO: 2.97 M/UL (ref 4.35–5.65)
SODIUM SERPL-SCNC: 133 MMOL/L (ref 136–145)
WBC # BLD AUTO: 6.5 K/UL (ref 4.6–13.2)

## 2025-06-18 PROCEDURE — 82550 ASSAY OF CK (CPK): CPT

## 2025-06-18 PROCEDURE — 82962 GLUCOSE BLOOD TEST: CPT

## 2025-06-18 PROCEDURE — 99156 MOD SED OTH PHYS/QHP 5/>YRS: CPT | Performed by: SURGERY

## 2025-06-18 PROCEDURE — 2500000003 HC RX 250 WO HCPCS: Performed by: INTERNAL MEDICINE

## 2025-06-18 PROCEDURE — 6360000002 HC RX W HCPCS: Performed by: INTERNAL MEDICINE

## 2025-06-18 PROCEDURE — 36556 INSERT NON-TUNNEL CV CATH: CPT

## 2025-06-18 PROCEDURE — 85027 COMPLETE CBC AUTOMATED: CPT

## 2025-06-18 PROCEDURE — 83605 ASSAY OF LACTIC ACID: CPT

## 2025-06-18 PROCEDURE — B5151ZA FLUOROSCOPY OF BILATERAL JUGULAR VEINS USING LOW OSMOLAR CONTRAST, GUIDANCE: ICD-10-PCS | Performed by: INTERNAL MEDICINE

## 2025-06-18 PROCEDURE — 99157 MOD SED OTHER PHYS/QHP EA: CPT | Performed by: SURGERY

## 2025-06-18 PROCEDURE — 77001 FLUOROGUIDE FOR VEIN DEVICE: CPT

## 2025-06-18 PROCEDURE — 36415 COLL VENOUS BLD VENIPUNCTURE: CPT

## 2025-06-18 PROCEDURE — 97162 PT EVAL MOD COMPLEX 30 MIN: CPT

## 2025-06-18 PROCEDURE — 80069 RENAL FUNCTION PANEL: CPT

## 2025-06-18 PROCEDURE — 1100000003 HC PRIVATE W/ TELEMETRY

## 2025-06-18 PROCEDURE — 97165 OT EVAL LOW COMPLEX 30 MIN: CPT

## 2025-06-18 PROCEDURE — 90935 HEMODIALYSIS ONE EVALUATION: CPT

## 2025-06-18 PROCEDURE — 6360000002 HC RX W HCPCS: Performed by: SURGERY

## 2025-06-18 PROCEDURE — 83880 ASSAY OF NATRIURETIC PEPTIDE: CPT

## 2025-06-18 PROCEDURE — 2500000003 HC RX 250 WO HCPCS: Performed by: SURGERY

## 2025-06-18 PROCEDURE — 2709999900 IR TUNNELED CVC PLACE WO SQ PORT/PUMP > 5 YEARS

## 2025-06-18 PROCEDURE — 2580000003 HC RX 258: Performed by: INTERNAL MEDICINE

## 2025-06-18 PROCEDURE — 6370000000 HC RX 637 (ALT 250 FOR IP): Performed by: INTERNAL MEDICINE

## 2025-06-18 PROCEDURE — B519ZZZ FLUOROSCOPY OF INFERIOR VENA CAVA: ICD-10-PCS | Performed by: INTERNAL MEDICINE

## 2025-06-18 PROCEDURE — 06HY33Z INSERTION OF INFUSION DEVICE INTO LOWER VEIN, PERCUTANEOUS APPROACH: ICD-10-PCS | Performed by: INTERNAL MEDICINE

## 2025-06-18 RX ORDER — LIDOCAINE HYDROCHLORIDE 10 MG/ML
30 INJECTION, SOLUTION EPIDURAL; INFILTRATION; INTRACAUDAL; PERINEURAL
Status: DISCONTINUED | OUTPATIENT
Start: 2025-06-18 | End: 2025-06-22 | Stop reason: HOSPADM

## 2025-06-18 RX ORDER — LIDOCAINE HYDROCHLORIDE AND EPINEPHRINE BITARTRATE 20; .01 MG/ML; MG/ML
INJECTION, SOLUTION SUBCUTANEOUS PRN
Status: COMPLETED | OUTPATIENT
Start: 2025-06-18 | End: 2025-06-18

## 2025-06-18 RX ORDER — HEPARIN SODIUM 200 [USP'U]/100ML
1000 INJECTION, SOLUTION INTRAVENOUS ONCE
Status: DISCONTINUED | OUTPATIENT
Start: 2025-06-18 | End: 2025-06-22 | Stop reason: HOSPADM

## 2025-06-18 RX ORDER — LIDOCAINE HYDROCHLORIDE AND EPINEPHRINE 10; 10 MG/ML; UG/ML
20 INJECTION, SOLUTION INFILTRATION; PERINEURAL ONCE
Status: DISCONTINUED | OUTPATIENT
Start: 2025-06-18 | End: 2025-06-22 | Stop reason: HOSPADM

## 2025-06-18 RX ORDER — LIDOCAINE HYDROCHLORIDE 10 MG/ML
INJECTION, SOLUTION EPIDURAL; INFILTRATION; INTRACAUDAL; PERINEURAL PRN
Status: COMPLETED | OUTPATIENT
Start: 2025-06-18 | End: 2025-06-18

## 2025-06-18 RX ORDER — MIDAZOLAM HYDROCHLORIDE 1 MG/ML
INJECTION, SOLUTION INTRAMUSCULAR; INTRAVENOUS PRN
Status: COMPLETED | OUTPATIENT
Start: 2025-06-18 | End: 2025-06-18

## 2025-06-18 RX ORDER — IOPAMIDOL 612 MG/ML
100 INJECTION, SOLUTION INTRAVASCULAR
Status: DISCONTINUED | OUTPATIENT
Start: 2025-06-18 | End: 2025-06-22 | Stop reason: HOSPADM

## 2025-06-18 RX ORDER — HEPARIN SODIUM 1000 [USP'U]/ML
5000 INJECTION, SOLUTION INTRAVENOUS; SUBCUTANEOUS
Status: DISCONTINUED | OUTPATIENT
Start: 2025-06-18 | End: 2025-06-22 | Stop reason: HOSPADM

## 2025-06-18 RX ORDER — ONDANSETRON 2 MG/ML
INJECTION INTRAMUSCULAR; INTRAVENOUS PRN
Status: COMPLETED | OUTPATIENT
Start: 2025-06-18 | End: 2025-06-18

## 2025-06-18 RX ORDER — AMOXICILLIN AND CLAVULANATE POTASSIUM 500; 125 MG/1; MG/1
1 TABLET, FILM COATED ORAL
Status: DISCONTINUED | OUTPATIENT
Start: 2025-06-18 | End: 2025-06-21 | Stop reason: HOSPADM

## 2025-06-18 RX ORDER — FENTANYL CITRATE 50 UG/ML
INJECTION, SOLUTION INTRAMUSCULAR; INTRAVENOUS PRN
Status: COMPLETED | OUTPATIENT
Start: 2025-06-18 | End: 2025-06-18

## 2025-06-18 RX ADMIN — LIDOCAINE HYDROCHLORIDE AND EPINEPHRINE 5 ML: 20; 10 INJECTION, SOLUTION INFILTRATION; PERINEURAL at 09:20

## 2025-06-18 RX ADMIN — MIDAZOLAM 1 MG: 1 INJECTION INTRAMUSCULAR; INTRAVENOUS at 08:34

## 2025-06-18 RX ADMIN — MIDAZOLAM 1 MG: 1 INJECTION INTRAMUSCULAR; INTRAVENOUS at 08:40

## 2025-06-18 RX ADMIN — DAPTOMYCIN 450 MG: 500 INJECTION, POWDER, LYOPHILIZED, FOR SOLUTION INTRAVENOUS at 20:34

## 2025-06-18 RX ADMIN — FENTANYL CITRATE 50 MCG: 50 INJECTION INTRAMUSCULAR; INTRAVENOUS at 08:40

## 2025-06-18 RX ADMIN — WATER 2000 MG: 1 INJECTION INTRAMUSCULAR; INTRAVENOUS; SUBCUTANEOUS at 08:16

## 2025-06-18 RX ADMIN — HYDRALAZINE HYDROCHLORIDE 50 MG: 25 TABLET ORAL at 05:31

## 2025-06-18 RX ADMIN — HYDRALAZINE HYDROCHLORIDE 50 MG: 25 TABLET ORAL at 23:45

## 2025-06-18 RX ADMIN — MIDAZOLAM 0.5 MG: 1 INJECTION INTRAMUSCULAR; INTRAVENOUS at 09:17

## 2025-06-18 RX ADMIN — SODIUM CHLORIDE, PRESERVATIVE FREE 10 ML: 5 INJECTION INTRAVENOUS at 11:13

## 2025-06-18 RX ADMIN — ONDANSETRON HYDROCHLORIDE 4 MG: 2 INJECTION INTRAMUSCULAR; INTRAVENOUS at 08:15

## 2025-06-18 RX ADMIN — METOCLOPRAMIDE HYDROCHLORIDE 5 MG: 5 INJECTION, SOLUTION INTRAMUSCULAR; INTRAVENOUS at 18:40

## 2025-06-18 RX ADMIN — AMOXICILLIN AND CLAVULANATE POTASSIUM 1 TABLET: 500; 125 TABLET, FILM COATED ORAL at 18:39

## 2025-06-18 RX ADMIN — HYDRALAZINE HYDROCHLORIDE 50 MG: 25 TABLET ORAL at 00:41

## 2025-06-18 RX ADMIN — LABETALOL HCL 200 MG: 200 TABLET, FILM COATED ORAL at 05:31

## 2025-06-18 RX ADMIN — FENTANYL CITRATE 50 MCG: 50 INJECTION INTRAMUSCULAR; INTRAVENOUS at 08:34

## 2025-06-18 RX ADMIN — ACETAMINOPHEN 500 MG: 500 TABLET ORAL at 18:39

## 2025-06-18 RX ADMIN — METOCLOPRAMIDE HYDROCHLORIDE 5 MG: 5 INJECTION, SOLUTION INTRAMUSCULAR; INTRAVENOUS at 20:24

## 2025-06-18 RX ADMIN — HYDROMORPHONE HYDROCHLORIDE 2 MG: 2 INJECTION, SOLUTION INTRAMUSCULAR; INTRAVENOUS; SUBCUTANEOUS at 05:20

## 2025-06-18 RX ADMIN — HYDRALAZINE HYDROCHLORIDE 50 MG: 25 TABLET ORAL at 18:39

## 2025-06-18 RX ADMIN — METOCLOPRAMIDE HYDROCHLORIDE 5 MG: 5 INJECTION, SOLUTION INTRAMUSCULAR; INTRAVENOUS at 10:24

## 2025-06-18 RX ADMIN — METOCLOPRAMIDE HYDROCHLORIDE 5 MG: 5 INJECTION, SOLUTION INTRAMUSCULAR; INTRAVENOUS at 05:31

## 2025-06-18 RX ADMIN — LIDOCAINE HYDROCHLORIDE 20 ML: 10 INJECTION, SOLUTION EPIDURAL; INFILTRATION; INTRACAUDAL; PERINEURAL at 09:30

## 2025-06-18 RX ADMIN — AMLODIPINE BESYLATE 5 MG: 5 TABLET ORAL at 20:25

## 2025-06-18 RX ADMIN — SODIUM CHLORIDE, PRESERVATIVE FREE 10 ML: 5 INJECTION INTRAVENOUS at 20:27

## 2025-06-18 RX ADMIN — FENTANYL CITRATE 25 MCG: 50 INJECTION INTRAMUSCULAR; INTRAVENOUS at 09:03

## 2025-06-18 RX ADMIN — WATER 2000 MG: 1 INJECTION INTRAMUSCULAR; INTRAVENOUS; SUBCUTANEOUS at 08:13

## 2025-06-18 ASSESSMENT — PAIN DESCRIPTION - LOCATION: LOCATION: LEG

## 2025-06-18 ASSESSMENT — PAIN SCALES - GENERAL
PAINLEVEL_OUTOF10: 10
PAINLEVEL_OUTOF10: 0

## 2025-06-18 ASSESSMENT — PAIN DESCRIPTION - DESCRIPTORS: DESCRIPTORS: CRAMPING

## 2025-06-18 ASSESSMENT — PAIN DESCRIPTION - ORIENTATION: ORIENTATION: LEFT

## 2025-06-18 NOTE — PLAN OF CARE
Problem: Chronic Conditions and Co-morbidities  Goal: Patient's chronic conditions and co-morbidity symptoms are monitored and maintained or improved  6/18/2025 1219 by Ruth Felix RN  Outcome: Progressing  6/18/2025 0647 by Lola Hedrick RN  Outcome: Progressing     Problem: Discharge Planning  Goal: Discharge to home or other facility with appropriate resources  6/18/2025 1219 by Ruth Felix RN  Outcome: Progressing  6/18/2025 0647 by Lola Hedrick RN  Outcome: Progressing     Problem: Pain  Goal: Verbalizes/displays adequate comfort level or baseline comfort level  6/18/2025 1219 by Ruth Felix RN  Outcome: Progressing  6/18/2025 0647 by Lola Hedrick RN  Outcome: Progressing     Problem: Safety - Adult  Goal: Free from fall injury  6/18/2025 1219 by Ruth Felix RN  Outcome: Progressing  6/18/2025 0647 by Lola Hedrick RN  Outcome: Progressing     Problem: Neurosensory - Adult  Goal: Achieves stable or improved neurological status  Outcome: Progressing  Goal: Achieves maximal functionality and self care  Outcome: Progressing     Problem: Respiratory - Adult  Goal: Achieves optimal ventilation and oxygenation  Outcome: Progressing     Problem: Musculoskeletal - Adult  Goal: Return mobility to safest level of function  Outcome: Progressing  Goal: Return ADL status to a safe level of function  Outcome: Progressing     Problem: Gastrointestinal - Adult  Goal: Minimal or absence of nausea and vomiting  Outcome: Progressing  Goal: Maintains adequate nutritional intake  Outcome: Progressing     Problem: Infection - Adult  Goal: Absence of fever/infection during anticipated neutropenic period  Outcome: Progressing     Problem: Metabolic/Fluid and Electrolytes - Adult  Goal: Electrolytes maintained within normal limits  Outcome: Progressing  Goal: Hemodynamic stability and optimal renal function maintained  Outcome: Progressing  Goal: Glucose maintained within prescribed range  Outcome: Progressing      Problem: Hematologic - Adult  Goal: Maintains hematologic stability  Outcome: Progressing     Problem: Skin/Tissue Integrity - Adult  Goal: Skin integrity remains intact  Outcome: Progressing  Goal: Oral mucous membranes remain intact  Outcome: Progressing     Problem: Genitourinary - Adult  Goal: Absence of urinary retention  Outcome: Progressing

## 2025-06-18 NOTE — PROGRESS NOTES
Occupational Therapy Goals:  Initiated 6/18/2025 to be met within 7-10 days.  Short Term Goals  Time Frame for Short Term Goals: 7 days  Short Term Goal 1: Patient will complete toilet transfer with SBA  Short Term Goal 2: Patient will complete bathing with SBA  Short Term Goal 3: Patient will complete grooming in standing at sink with SBA  Short Term Goal 4: Patient will complete dressing with SBA  Short Term Goal 5: Patient will complete toileting with SBA    OCCUPATIONAL THERAPY EVALUATION    Patient: Evan Huizar (54 y.o. male)  Date: 6/18/2025  Primary Diagnosis: Hypertensive urgency [I16.0]  Hypertensive emergency [I16.1]  Displacement of vascular dialysis catheter, initial encounter [T82.42XA]  Syncope, unspecified syncope type [R55]       Precautions: Fall Risk,  ,  ,  ,  ,  ,  ,              PLOF: Patient completed ADLs at the modified independent level with rollator and R prosthetic leg    ASSESSMENT :  Patient presented supine in bed with cardiac monitor and pulse oximeter. Patient asleep at start of session, lethargic throughout. Patient with no visitors present for entire session. Patient completed assessment of ADLs and BUE strength, ROM (see details below). Patient donned gripper sock at bed level using figure four positioning. Patient completed supine to sit, decreased effort on part of patient. Patient reporting woozy, tired declined standing at this time. Patient returned self to supine.   DEFICITS/IMPAIRMENTS:  Performance deficits / Impairments: Decreased functional mobility ;Decreased high-level IADLs;Decreased ADL status;Decreased endurance    Patient will benefit from skilled intervention to address the above impairments.  Patient's rehabilitation potential is considered to be Prognosis: Fair.  Factors which may influence rehabilitation potential include:   []             None noted  [x]             Mental ability/status  [x]             Medical condition  [x]             Home/family  No  Functional Mobility         ADL Assessment:      Feeding: Independent;Based on clinical judgement  Grooming: Setup;Based on clinical judgement (in sitting)  UE Bathing: Minimal assistance;Based on clinical judgement  LE Bathing: Minimal assistance;Based on clinical judgement  UE Dressing: Supervision;Based on clinical judgement  LE Dressing: Stand by assistance  Toileting: Contact guard assistance;Based on clinical judgement      Pain:  Pain level pre-treatment: 0/10   Pain level post-treatment: 0/10   Pain Intervention(s): Rest, Ice, Repositioning   Response to intervention: Nurse notified    Activity Tolerance:   Activity Tolerance: Patient Tolerated treatment well  Please refer to the flowsheet for vital signs taken during this treatment.    After treatment:   Safety Devices  Type of Devices: Left in bed, Call light within reach, Bed alarm in place   COMMUNICATION/EDUCATION:   Patient Education  Education Given To: Patient  Education Provided: Role of Therapy;Transfer Training;Plan of Care  Education Method: Verbal  Barriers to Learning: None  Education Outcome: Verbalized understanding;Demonstrated understanding    Thank you for this referral.  MARYAM Wei, OTR/L  Minutes: 14    Eval Complexity: Decision Making: Low Complexity

## 2025-06-18 NOTE — PROGRESS NOTES
RENAL CONSULT PROGRESS NOTE   2025    Patient:  Evan Huizar  :  1970  Gender:  male  MRN #:  [id number]    Reason for Consult: bacteremia and ESRD related care     Subjective;    Saw him after TDC , Feels okay/but still sleepy due to anesthesia   Denied fever or chills  No dyspnea and Bp  stable     Objective:    BP (!) 128/49   Pulse 75   Temp 98.1 °F (36.7 °C) (Oral)   Resp 20   Ht 1.702 m (5' 7.01\")   Wt 75.2 kg (165 lb 12.6 oz)   SpO2 94%   BMI 25.96 kg/m²     Physical Exam:    Pt awake,    Lung: clear to auscultation  No edema       Laboratory Data:    Lab Results   Component Value Date    BUN 46 (H) 2025    BUN 39 (H) 2025    BUN 96 (H) 2025     (L) 2025     (L) 2025     2025    CO2 23 2025    CO2 23 2025    CO2 20 (L) 2025    GLUCOSE 74 2025    GLUCOSE 94 2025    GLUCOSE 90 2025     Lab Results   Component Value Date    WBC 6.5 2025    HGB 7.5 (L) 2025    HCT 23.5 (L) 2025     Lab Results   Component Value Date    CALCIUM 7.8 (L) 2025     Lab Results   Component Value Date    HDL 62 (H) 2022     No results found for: \"TURBIDITY\"    Imaging Reveiwed:      EKG  Renal ultrasound  Xray chest-    IMPRESSION:  1. Enlarged cardiac silhouette, otherwise no acute disease      Assessment:  Evan Huizar is a 54 y.o. year old male  with  ESRD on HD,  DM-2, anemia, uncontrolled hypertension , hyperphosphatemia,  non compliant to dialysis prescription, dietary/life style modification came to hospital emergency as TDC fell  off    Wound culture and blood culture was sent from dialysis unit on  both grew staph epidermidis   Details in in patient chart     ESRD  Non compliant   Uncontrolled hypertension   Hyperphosphatemia   Bacteremia         Plan:    Plan for dialysis today for clearance and volume removal     Appreciate ID  assistance, on daptomycin post HD   Continue  labetalol,. Hydralazine , amlodipine for hypertension   Continue antibiotics per ID   Sensipar for secondary hyperparathyroidism   Sevelamer for hyperphosphatemia         Rakesh Vincent MD, CODI  [facility] [place]

## 2025-06-18 NOTE — PROGRESS NOTES
1009: Pt Returned from Cath placement & shift assessment started, Pt's SPO2 dropped to 87% via RA, deep breathing encouraged, BP is soft (BP medications held at this moment) Pt drowsy r/t s/p procedure however, able to stay alert through conversation, Pt IS A&O x4, Chest rise and fall equally, no s/sx of respiratory distress noted at this moment, Pt placed on 2L via n/c, SPO2 currently 98%, Pt declined morning PO medications at this moment r/t drowsiness,  no c/o of pain or discomfort noted at this moment, HR stable, Pt placed on continuous SPO2 monitoring, pressure dressing Lt groin us C/D/I & w/o complications. Pt currently resting in bed at safe position, call bell within reach, ongoing monitoring gin place.     1120: BS 77, 118 ml of orange juice given to Pt r/t recent NPO status.

## 2025-06-18 NOTE — PROGRESS NOTES
Bedside and Verbal shift change report given to Morena RN (oncoming nurse) by Lola Hedrick RN (offgoing nurse). Report included the following information Nurse Handoff Report, Adult Overview, Intake/Output, MAR, Recent Results, and Med Rec Status.
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
## 2025-06-18 NOTE — PROGRESS NOTES
Patient in bed awake, A/Ox 3, speech clear, hearing intact, ambulatory, continent of urine and stool. On room air, diminished breath sounds R apex, R anterior, R base, L apex, L anterior, L base 18, respirations unlabored. Skin is warm, dry, and intact. Dressing clean, dry, and intact. Radial and pedal pulses present bilaterally, capillary refill <3 seconds to fingers and toes. Abdomen soft, bowel sounds hypoactive. moderate hand  noted to bilateral upper extremities. Side rails x3 up, bed locked and in lowest position,     Patient called out at @0515 c/o pain BLE. But mostly on the LLE. Pain medication was given and it was effective.He again c/o the same spasm pain during shift change.

## 2025-06-18 NOTE — PROGRESS NOTES
met patient's nurse on [address].    Patient was sleeping at the time of the visit. Nurse said patient had a procedure to change his dialysis port earlier in the day. Patient has been in a lot of pain. Nurse thanked  for the support.      provided presence and support for staff.    Chaplains will provide follow-up care for patient, family, and staff as needed.    Spiritual Health History and Assessment/Progress Note  [facility]    Spiritual/Emotional Needs,  ,  ,      Name: Evan Huizar MRN: [id number]    Age: 54 y.o.     Sex: male   Language: English   Synagogue: Hinduism   Hypertensive emergency     Date: 6/18/2025            Total Time Calculated: 10 min              Spiritual Assessment began in [facility] [address] [place] CARDIAC/MEDICAL            Encounter Overview/Reason: Spiritual/Emotional Needs  Service Provided For: Patient    Caridad, Belief, Meaning:   Patient identifies as spiritual, is connected with a caridad tradition or spiritual practice, and has beliefs or practices that help with coping during difficult times  Family/Friends No family/friends present      Importance and Influence:  Patient unable to assess at this time  Family/Friends No family/friends present    Community:  Patient is connected with a spiritual community and feels well-supported. Support system includes: Extended family  Family/Friends No family/friends present    Assessment and Plan of Care:     Patient Interventions include: Other: Unable to assess.  Family/Friends Interventions include: No family/friends present    Patient Plan of Care: No spiritual needs identified for follow-up  Family/Friends Plan of Care: No family/friends present    Electronically signed by Chaplain Evelyne on 6/18/2025 at 2:15 PM
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
## 2025-06-18 NOTE — OR NURSING
Pt was educated to procedure and sedation. Pt NPO. Pt confirms no problems with sedation in the past.     
Pt was educated to procedure and sedation. Pt NPO. Pt confirms no problems with sedation in the past.     
weight-bearing as tolerated

## 2025-06-18 NOTE — PROGRESS NOTES
Vascular Surgery Brief Progress Note:    Notes, results, events reviewed.    BP (!) 128/49   Pulse 75   Temp 98.1 °F (36.7 °C) (Oral)   Resp 20   Ht 1.702 m (5' 7.01\")   Wt 75.2 kg (165 lb 12.6 oz)   SpO2 94%   BMI 25.96 kg/m²     54 y.o. male with ESRD who had temp HD cath placed left CFV 2 days ago due to positive outpatient blood cultures.  NGTD on cultures here.  Cultures outpatient were staph epi.      Complaining of left leg pain today, sciatica type pain.  Left foot warm, perfused.  No wounds.  Right BKA.  Has some left eye discharge.    Plan for TDC placement and venogram today.  Will try to place somewhere upper chest, IJs appear occluded bilaterally, will do venogram to guide placement.  If no options then will place femoral TDC.    Risks, benefits discussed with patient including but not limited to bleeding, infection, damage to other structures, cardiopulmonary effects of moderate sedation.  Risk of pneumothorax discussed.  Patient wishes to proceed.    CBC w/Diff   Lab Results   Component Value Date/Time    WBC 6.5 06/18/2025 02:16 AM    RBC 2.97 (L) 06/18/2025 02:16 AM    HCT 23.5 (L) 06/18/2025 02:16 AM    MCV 79.1 06/18/2025 02:16 AM    MCH 25.3 06/18/2025 02:16 AM    MCHC 31.9 06/18/2025 02:16 AM    RDW 18.9 (H) 06/18/2025 02:16 AM    MPV 11.2 06/18/2025 02:16 AM        Basic Metabolic Profile   Lab Results   Component Value Date     (L) 06/18/2025    CO2 23 06/18/2025    BUN 46 (H) 06/18/2025    GLUCOSE 74 06/18/2025    CALCIUM 7.8 (L) 06/18/2025        Coagulation   Lab Results   Component Value Date    INR 1.2 (H) 06/16/2025    APTT 44.2 (H) 06/16/2025        Mikaela Spears MD  Vascular Surgeon

## 2025-06-18 NOTE — PRE SEDATION
Sedation Plan  ASA: class 4 - patient with severe systemic disease that is a constant threat to life     Mallampati class: II - soft palate, uvula, fauces visible.    Sedation plan: local anesthesia and moderate (conscious sedation)    Risks, benefits, and alternatives discussed with patient.        Immediate reassessment prior to sedation:  Patient's status reviewed and vital signs assessed; acceptable to perform procedure and proceed to administer sedation as planned.

## 2025-06-18 NOTE — PROGRESS NOTES
TRANSFER - OUT REPORT:    Verbal report given to SANIA Ceron  Raj RN(name) on Evan Huizar being transferred to [address] [place] [address](unit) for routine progression of patient care       Report consisted of patient's Situation, Background, Assessment and   Recommendations(SBAR).     Information from the following report(s) Nurse Handoff Report, Adult Overview, Surgery Report, Cardiac Rhythm NSR, and Event Log was reviewed with the receiving nurse.    Opportunity for questions and clarification was provided.

## 2025-06-18 NOTE — PROGRESS NOTES
Physical Therapy Goals:  Initiated 6/18/2025 to be met within 7-10 days.  Short Term Goals  Short Term Goal 1: Patient will perform supine to/from sit with independence  Short Term Goal 2: Patient will perform sit to/from stand with supervision in prep for gait progression  Short Term Goal 3: Patient will ambulate 50 ft with LRAD and supervision to progress OOB mobility  PHYSICAL THERAPY EVALUATION    Patient: Evan Huizar (54 y.o. male)  Date: 6/18/2025  Diagnosis: Hypertensive urgency [I16.0]  Hypertensive emergency [I16.1]  Displacement of vascular dialysis catheter, initial encounter [T82.42XA]  Syncope, unspecified syncope type [R55] Hypertensive emergency  Precautions: Fall Risk  PLOF: Patient reports living independently prior to admission and ambulating with rollator.    ASSESSMENT :  Patient received supine in bed. Pt presents with decreased LE strength, decreased endurance, decreased gait quailty, decreased activity tolerance, decreased bed mobility and transfers , and impaired balance. Patient drowsy upon entering and required tactile to arouse to allow participation. Patient required supine to sit with ModA; pt limited by self-reported fatigue. Patient refused to attempt standing due to fatigue. Prosthesis and personal rollator at beside. Patient appears below baseline level and would benefit from continued skilled PT to progress functional mobility. Will continue to follow patient and progress mobility as tolerated when pt less fatigued and agreeable to attempt donning of prosthesis and ambulation.     DEFICITS/IMPAIRMENTS:   Body Structures, Functions, Activity Limitations Requiring Skilled Therapeutic Intervention: Decreased functional mobility ;Decreased strength;Decreased endurance;Decreased balance;Increased pain;Decreased posture;Decreased coordination;Decreased cognition    Patient will benefit from skilled intervention to address the above impairments.  Patient's rehabilitation

## 2025-06-19 LAB
ALBUMIN SERPL-MCNC: 2.3 G/DL (ref 3.4–5)
ANION GAP SERPL CALC-SCNC: 15 MMOL/L (ref 3–18)
BUN SERPL-MCNC: 44 MG/DL (ref 6–23)
BUN/CREAT SERPL: 5 (ref 12–20)
CALCIUM SERPL-MCNC: 7.5 MG/DL (ref 8.5–10.1)
CHLORIDE SERPL-SCNC: 95 MMOL/L (ref 98–107)
CO2 SERPL-SCNC: 23 MMOL/L (ref 21–32)
CREAT SERPL-MCNC: 9.04 MG/DL (ref 0.6–1.3)
GLUCOSE BLD STRIP.AUTO-MCNC: 110 MG/DL (ref 70–110)
GLUCOSE BLD STRIP.AUTO-MCNC: 89 MG/DL (ref 70–110)
GLUCOSE BLD STRIP.AUTO-MCNC: 97 MG/DL (ref 70–110)
GLUCOSE SERPL-MCNC: 72 MG/DL (ref 74–108)
PHOSPHATE SERPL-MCNC: 7.3 MG/DL (ref 2.5–4.9)
POTASSIUM SERPL-SCNC: 4.7 MMOL/L (ref 3.5–5.5)
SODIUM SERPL-SCNC: 133 MMOL/L (ref 136–145)

## 2025-06-19 PROCEDURE — 6370000000 HC RX 637 (ALT 250 FOR IP): Performed by: INTERNAL MEDICINE

## 2025-06-19 PROCEDURE — 82962 GLUCOSE BLOOD TEST: CPT

## 2025-06-19 PROCEDURE — 6370000000 HC RX 637 (ALT 250 FOR IP): Performed by: HOSPITALIST

## 2025-06-19 PROCEDURE — 36415 COLL VENOUS BLD VENIPUNCTURE: CPT

## 2025-06-19 PROCEDURE — 2500000003 HC RX 250 WO HCPCS: Performed by: INTERNAL MEDICINE

## 2025-06-19 PROCEDURE — 1100000003 HC PRIVATE W/ TELEMETRY

## 2025-06-19 PROCEDURE — 97530 THERAPEUTIC ACTIVITIES: CPT

## 2025-06-19 PROCEDURE — 80069 RENAL FUNCTION PANEL: CPT

## 2025-06-19 PROCEDURE — 6360000002 HC RX W HCPCS: Performed by: INTERNAL MEDICINE

## 2025-06-19 RX ORDER — METOCLOPRAMIDE 5 MG/1
5 TABLET ORAL
Status: DISCONTINUED | OUTPATIENT
Start: 2025-06-19 | End: 2025-06-21 | Stop reason: HOSPADM

## 2025-06-19 RX ADMIN — ACETAMINOPHEN 500 MG: 500 TABLET ORAL at 08:57

## 2025-06-19 RX ADMIN — SEVELAMER CARBONATE 800 MG: 800 TABLET, FILM COATED ORAL at 17:20

## 2025-06-19 RX ADMIN — SODIUM CHLORIDE, PRESERVATIVE FREE 10 ML: 5 INJECTION INTRAVENOUS at 20:17

## 2025-06-19 RX ADMIN — LABETALOL HCL 200 MG: 200 TABLET, FILM COATED ORAL at 20:16

## 2025-06-19 RX ADMIN — HYDRALAZINE HYDROCHLORIDE 50 MG: 25 TABLET ORAL at 23:42

## 2025-06-19 RX ADMIN — SODIUM CHLORIDE, PRESERVATIVE FREE 10 ML: 5 INJECTION INTRAVENOUS at 09:06

## 2025-06-19 RX ADMIN — CINACALCET HYDROCHLORIDE 90 MG: 30 TABLET, FILM COATED ORAL at 08:57

## 2025-06-19 RX ADMIN — SEVELAMER CARBONATE 800 MG: 800 TABLET, FILM COATED ORAL at 11:36

## 2025-06-19 RX ADMIN — CALCITRIOL CAPSULES 0.25 MCG 0.5 MCG: 0.25 CAPSULE ORAL at 08:56

## 2025-06-19 RX ADMIN — AMLODIPINE BESYLATE 5 MG: 5 TABLET ORAL at 20:16

## 2025-06-19 RX ADMIN — BISACODYL 5 MG: 5 TABLET, COATED ORAL at 08:57

## 2025-06-19 RX ADMIN — ACETAMINOPHEN 500 MG: 500 TABLET ORAL at 17:20

## 2025-06-19 RX ADMIN — METOCLOPRAMIDE HYDROCHLORIDE 5 MG: 5 INJECTION, SOLUTION INTRAMUSCULAR; INTRAVENOUS at 11:36

## 2025-06-19 RX ADMIN — HYDRALAZINE HYDROCHLORIDE 50 MG: 25 TABLET ORAL at 17:20

## 2025-06-19 RX ADMIN — AMLODIPINE BESYLATE 5 MG: 5 TABLET ORAL at 08:57

## 2025-06-19 RX ADMIN — AMOXICILLIN AND CLAVULANATE POTASSIUM 1 TABLET: 500; 125 TABLET, FILM COATED ORAL at 08:57

## 2025-06-19 RX ADMIN — HYDRALAZINE HYDROCHLORIDE 50 MG: 25 TABLET ORAL at 11:36

## 2025-06-19 RX ADMIN — OXYCODONE 5 MG: 5 TABLET ORAL at 09:06

## 2025-06-19 RX ADMIN — OXYCODONE 5 MG: 5 TABLET ORAL at 02:39

## 2025-06-19 RX ADMIN — ACETAMINOPHEN 500 MG: 500 TABLET ORAL at 11:36

## 2025-06-19 RX ADMIN — LABETALOL HCL 200 MG: 200 TABLET, FILM COATED ORAL at 15:12

## 2025-06-19 RX ADMIN — METOCLOPRAMIDE 5 MG: 5 TABLET ORAL at 17:20

## 2025-06-19 ASSESSMENT — PAIN DESCRIPTION - ORIENTATION
ORIENTATION: RIGHT
ORIENTATION: LEFT
ORIENTATION: RIGHT

## 2025-06-19 ASSESSMENT — PAIN DESCRIPTION - DESCRIPTORS
DESCRIPTORS: ACHING

## 2025-06-19 ASSESSMENT — PAIN SCALES - GENERAL
PAINLEVEL_OUTOF10: 0
PAINLEVEL_OUTOF10: 10
PAINLEVEL_OUTOF10: 8
PAINLEVEL_OUTOF10: 0
PAINLEVEL_OUTOF10: 10

## 2025-06-19 ASSESSMENT — PAIN DESCRIPTION - FREQUENCY
FREQUENCY: INTERMITTENT
FREQUENCY: INTERMITTENT

## 2025-06-19 ASSESSMENT — PAIN DESCRIPTION - LOCATION
LOCATION: LEG

## 2025-06-19 ASSESSMENT — PAIN DESCRIPTION - ONSET
ONSET: ON-GOING
ONSET: ON-GOING

## 2025-06-19 ASSESSMENT — PAIN DESCRIPTION - PAIN TYPE
TYPE: CHRONIC PAIN
TYPE: ACUTE PAIN
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
## 2025-06-19 NOTE — CARE COORDINATION
This CM met with the patient at the bedside to discuss DC planning. The patient states at the time of DC he does not want to go to SNF but is interested in HH. Choice list given and referrals placed.

## 2025-06-19 NOTE — PLAN OF CARE
Problem: Chronic Conditions and Co-morbidities  Goal: Patient's chronic conditions and co-morbidity symptoms are monitored and maintained or improved  6/19/2025 0534 by Katie Grande, RN  Outcome: Progressing  6/19/2025 0120 by Lauren King, RN  Outcome: Progressing  INTERVENTION:  HEMODYNAMIC STABILIZATION  MAINTAIN BP WNL WHILE ON HD.     INTERVENTION:  FLUID MANAGEMENT  WILL ATTEMPT 2000 ML TOTAL FLUID REMOVAL AS TOLERATED.     INTERVENTION:  METABOLIC/ELECTROLYTE MANAGEMENT  2.0 POTASSIUM 2.5 CALCIUM DIALYSATE USED WITH HD TODAY.     INTERVENTION:  HEMODIALYSIS ACCESS SITE MANAGEMENT  LEFT IJ TDC ACCESSED USING ASEPTIC TECHNIQUE.     GOAL:  SIGNS AND SYMPTOMS OF LISTED POTENTIAL PROBLEMS WILL BE ABSENT OR MANAGEABLE.     OUTCOME:  PROGRESSING.     HD PLANNED FOR 3.5 HOURS TODAY.

## 2025-06-19 NOTE — PROGRESS NOTES
RENAL CONSULT PROGRESS NOTE   2025    Patient:  Evan Huizar  :  1970  Gender:  male  MRN #:  [id number]    Reason for Consult: bacteremia and ESRD related care     Subjective;   Says he is fine    Denied fever or chills  No dyspnea and Bp  stable     Objective:    BP (!) 169/76   Pulse 86   Temp 98.4 °F (36.9 °C)   Resp 18   Ht 1.702 m (5' 7.01\")   Wt 75.2 kg (165 lb 12.6 oz)   SpO2 97%   BMI 25.96 kg/m²     Physical Exam:    Pt awake,    Lung: clear to auscultation  No edema       Laboratory Data:    Lab Results   Component Value Date    BUN 44 (H) 2025    BUN 46 (H) 2025    BUN 39 (H) 2025     (L) 2025     (L) 2025     (L) 2025    CO2 23 2025    CO2 23 2025    CO2 23 2025    GLUCOSE 72 (L) 2025    GLUCOSE 74 2025    GLUCOSE 94 2025     Lab Results   Component Value Date    WBC 6.5 2025    HGB 7.5 (L) 2025    HCT 23.5 (L) 2025     Lab Results   Component Value Date    CALCIUM 7.5 (L) 2025     Lab Results   Component Value Date    HDL 62 (H) 2022     No results found for: \"TURBIDITY\"    Imaging Reveiwed:      EKG  Renal ultrasound  Xray chest-    IMPRESSION:  1. Enlarged cardiac silhouette, otherwise no acute disease      Assessment:  Evan Huizar is a 54 y.o. year old male  with  ESRD on HD,  DM-2, anemia, uncontrolled hypertension , hyperphosphatemia,  non compliant to dialysis prescription, dietary/life style modification came to hospital emergency as TDC fell  off    Wound culture and blood culture was sent from dialysis unit on  both grew staph epidermidis   Details in in patient chart     ESRD  Non compliant   Uncontrolled hypertension   Hyperphosphatemia   Bacteremia         Plan:    Dialysis was completed this morning despite order for yesterday afternoon   Next HD tomorrow    Appreciate ID  assistance, on daptomycin post HD , if he needs ling term dapto, needs

## 2025-06-19 NOTE — PROGRESS NOTES
[facility]       PROGRESS NOTE    Date: 2025    ASSESSMENT/PLAN: 54 year old man s/p supra-clavicular Left subclavian TDC placement and removal of temp femoral HD catheter. Presented to the ED on 6/15 after notig his R IJ TDC was missing.    Hx of multiple prior B/L IJ TDCs, as well as prior LUE AVG that is no longer functional, had david-graft abscess w/ rupture in 2022.    Discussed outpatient follow-up in vascular clinic with vein mapping. He is in agreement to this.  May need LUE HeRo graft placement or new long term AV access in his Right arm. No prior surgeries in Right arm.  Vein mapping ordered through [facility] and clinic appt requested. F/u instructions placed in DC orders.    Will sign off, please contact with any questions/concerns.       Subjective:     No complaints. Dialyzed successfully via TDC early this am.    Objective:   Admit weight: Weight - Scale: 70 kg (154 lb 5.2 oz)  Last recorded weight: Weight - Scale: 75.2 kg (165 lb 12.6 oz)    Temp (24hrs), Av.1 °F (36.7 °C), Min:97.7 °F (36.5 °C), Max:98.4 °F (36.9 °C)    BP (!) 169/76   Pulse 86   Temp 98.4 °F (36.9 °C)   Resp 18   Ht 1.702 m (5' 7.01\")   Wt 75.2 kg (165 lb 12.6 oz)   SpO2 97%   BMI 25.96 kg/m²       Intake/Output Summary (Last 24 hours) at 2025 1114  Last data filed at 2025 0812  Gross per 24 hour   Intake 1120 ml   Output 2500 ml   Net -1380 ml     Exam:    Neuro: Alert and oriented  CV: RR  Resp: lungs CTAB, normal effort  GI: soft, non-tender, non-distended  MSK: no thrill in non-functional LUE AVG  Skin: healed scars to Left upper arm, old R TDC site to upper chest with some eschar, scarring to Left upper chest as well    Labs:     BMP:   Recent Labs     25  0446 25  0216 25  0400   GLUCOSE 94 74 72*   CALCIUM 8.5 7.8* 7.5*   BUN 39* 46* 44*   * 133* 133*   CO2 23 23 23     CBC:    Recent Labs     25  1631 25  0446 25  0216   WBC

## 2025-06-19 NOTE — PROGRESS NOTES
met patient at bedside for an initial visit.    Patient said he was okay but did not want to talk about why he was in the hospital. Patient thanked  for the visit.     provided presence and support for patient.    Chaplains will provide follow-up care for patient and family as needed.    Spiritual Health History and Assessment/Progress Note  [facility]    Spiritual/Emotional Needs,  ,  ,      Name: Evan Huizar MRN: [id number]    Age: 54 y.o.     Sex: male   Language: English   Mosque: Spiritism   Hypertensive emergency     Date: 6/19/2025            Total Time Calculated: 5 min              Spiritual Assessment began in [address] CARDIAC/MEDICAL            Encounter Overview/Reason: Spiritual/Emotional Needs  Service Provided For: Patient    Caridad, Belief, Meaning:   Patient identifies as spiritual, is connected with a caridad tradition or spiritual practice, and has beliefs or practices that help with coping during difficult times  Family/Friends No family/friends present      Importance and Influence:  Patient unable to assess at this time  Family/Friends No family/friends present    Community:  Patient feels well-supported. Support system includes: Extended family  Family/Friends No family/friends present    Assessment and Plan of Care:     Patient Interventions include: Facilitated expression of thoughts and feelings and Explored spiritual coping/struggle/distress  Family/Friends Interventions include: No family/friends present    Patient Plan of Care: No spiritual needs identified for follow-up  Family/Friends Plan of Care: No family/friends present    Electronically signed by Chaplain Evelyne on 6/19/2025 at 3:31 PM

## 2025-06-19 NOTE — PROGRESS NOTES
20:25 Assessment completed. Lungs are clear bilat. Offers 0 c/o CP,pressure, or SOB.Resting quietly in bed.    21:00 Paged on-call Dialysis nurse & left message for dialysis treatment tonight.    21:40 Chg nurseBelkis paged for dialysis    21:45 Paged again  to Krissy admin. mail box full.    22:25 Supervisor  Kimberly aware of inability to get in touch with on call RN.    23:00 Shift assessment completed. See ns flow sheet for details.    00:30 Dialysis nurse is in the ER & aware of order for dialysis tonight.    02:45 Reassess with 0 changes noted. Resting quietly in bed with eyes closed x for using the BSC for BM,    05:25 Dialysis nurse here for bedside tx.    07:10 Bedside and Verbal shift change report given to KRISTEN Kuo RN (oncoming nurse) by JOSE King RN (offgoing nurse). Report included the following information Nurse Handoff Report.

## 2025-06-19 NOTE — PLAN OF CARE
Problem: Chronic Conditions and Co-morbidities  Goal: Patient's chronic conditions and co-morbidity symptoms are monitored and maintained or improved  6/19/2025 1311 by Sean Kuo RN  Outcome: Progressing  6/19/2025 1311 by Sean Kuo RN  Outcome: Progressing  6/19/2025 0534 by Katie Grande RN  Outcome: Progressing  6/19/2025 0120 by Lauren King RN  Outcome: Progressing     Problem: Discharge Planning  Goal: Discharge to home or other facility with appropriate resources  6/19/2025 1311 by Sean Kuo RN  Outcome: Progressing  6/19/2025 1311 by Sean Kuo RN  Outcome: Progressing     Problem: Pain  Goal: Verbalizes/displays adequate comfort level or baseline comfort level  6/19/2025 1311 by Sean Kuo RN  Outcome: Progressing  6/19/2025 1311 by Sean Kuo RN  Outcome: Progressing  6/19/2025 0120 by Lauren King RN  Outcome: Progressing  Flowsheets (Taken 6/18/2025 2300)  Verbalizes/displays adequate comfort level or baseline comfort level:   Encourage patient to monitor pain and request assistance   Assess pain using appropriate pain scale   Administer analgesics based on type and severity of pain and evaluate response   Implement non-pharmacological measures as appropriate and evaluate response   Notify Licensed Independent Practitioner if interventions unsuccessful or patient reports new pain     Problem: Safety - Adult  Goal: Free from fall injury  6/19/2025 1311 by Sean Kuo RN  Outcome: Progressing  6/19/2025 1311 by Sean Kuo RN  Outcome: Progressing  6/19/2025 0120 by Lauren King RN  Outcome: Progressing  Flowsheets (Taken 6/18/2025 2257)  Free From Fall Injury: Instruct family/caregiver on patient safety     Problem: Neurosensory - Adult  Goal: Achieves stable or improved neurological status  6/19/2025 1311 by Sean Kuo RN  Outcome: Progressing  6/19/2025 1311 by Sean Kuo RN  Outcome: Progressing  Goal: Achieves maximal functionality and self
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
## 2025-06-19 NOTE — PROGRESS NOTES
IV to PO Conversion      Patient: Evan Huizar   MRN#: [id number]   Admission Date: 061525     Criteria for IV to PO switch - Nonantibiotics   Functioning GI tract   Taking scheduled oral medications  Tolerating diet more advanced than clear liquids  2. No signs/symptoms of shock  No vasopressor support        3.   No seizures for >72 hours (antiepileptic medications only)    Exclusion Criteria   Patient is being treated for an infection that requires IV therapy such as: endocarditis, osteomyelitis, meningitis, pancreatitis (antibiotics only)   NG tube with continous suction   Nausea and/or vomiting or severe diarrhea within past 24 hours  Malabsorption syndromes, partial or total gastrectomy, ileus, gastric outlet or bowel obstruction  Active GI bleeding   Significant painful oral ulceration  Unable to swallow  On total parenteral nutrition with a NPO order  NPO  Febrile in last 24 hours (antibiotics only)  Clinically deteriorating or unstable (antibiotics only)      Assessment:    Metoclopramide 5 mg IV QID AC&HS has been changed to Metoclopramide 5 mg PO TID AC ( discussed with Dr. Roy via perfect serve )     This IV to PO conversion is based on the [facility] P&T approved automatic conversion policy for eligible patients.      DIAMOND KOHLI RPH, [facility]  Clinical Pharmacist  [phone]

## 2025-06-19 NOTE — PROGRESS NOTES
PHYSICAL THERAPY TREATMENT    Patient: Evan Huizar (54 y.o. male)  Date: 6/19/2025  Diagnosis: Hypertensive urgency [I16.0]  Hypertensive emergency [I16.1]  Displacement of vascular dialysis catheter, initial encounter [T82.42XA]  Syncope, unspecified syncope type [R55] Hypertensive emergency      Precautions: Fall Risk,  ,  ,  ,  ,  ,  ,        ASSESSMENT:  Pt found in bed, on BSC. Bed mobility performed for bed pan removal and Norma-care. Although pt is able to handle bed mobility, he had no interest performing self care.  Pt refused additional activity noting fatigue from HD. Will resume tomorrow.     Progression toward goals: Poor  []      Improving appropriately and progressing toward goals  [x]      Improving slowly and progressing toward goals  []      Not making progress toward goals and plan of care will be adjusted     PLAN:  Patient continues to benefit from skilled intervention to address the above impairments.  Continue treatment per established plan of care.    Further Equipment Recommendations for Discharge: NA    [facility]:   AM-PAC Inpatient Mobility without Stair Climbing Raw Score : 11    Current research shows that an AM-PAC score of 17 (13 without stairs) or less is not associated with a discharge to the patient's home setting. Based on an AM-PAC score and their current functional mobility deficits, it is recommended that the patient have 5-7 sessions per week of Physical Therapy at d/c to increase the patient's independence.  Currently, this patient demonstrates the potential endurance, and/or tolerance for 3 hours of therapy each day at d/c.    Current research shows that an AM-PAC score of 17 (13 without stairs) or less is not associated with a discharge to the patient's home setting. Based on an AM-PAC score and their current functional mobility deficits, it is recommended that the patient have 3-5 sessions per week of Physical Therapy at d/c to increase the patient's independence.

## 2025-06-19 NOTE — PLAN OF CARE
Problem: Chronic Conditions and Co-morbidities  Goal: Patient's chronic conditions and co-morbidity symptoms are monitored and maintained or improved  6/19/2025 0120 by Lauren King RN  Outcome: Progressing  6/18/2025 1219 by Ruth Felix RN  Outcome: Progressing     Problem: Pain  Goal: Verbalizes/displays adequate comfort level or baseline comfort level  6/19/2025 0120 by Lauren King, RN  Outcome: Progressing  Flowsheets (Taken 6/18/2025 2300)  Verbalizes/displays adequate comfort level or baseline comfort level:   Encourage patient to monitor pain and request assistance   Assess pain using appropriate pain scale   Administer analgesics based on type and severity of pain and evaluate response   Implement non-pharmacological measures as appropriate and evaluate response   Notify Licensed Independent Practitioner if interventions unsuccessful or patient reports new pain  6/18/2025 1219 by Ruth Felix RN  Outcome: Progressing     Problem: Safety - Adult  Goal: Free from fall injury  6/19/2025 0120 by Lauren King RN  Outcome: Progressing  Flowsheets (Taken 6/18/2025 2257)  Free From Fall Injury: Instruct family/caregiver on patient safety  6/18/2025 1219 by Ruth Felix RN  Outcome: Progressing     Problem: Respiratory - Adult  Goal: Achieves optimal ventilation and oxygenation  6/19/2025 0120 by Lauren King RN  Outcome: Progressing  6/18/2025 1219 by Ruth Felix RN  Outcome: Progressing     Problem: Musculoskeletal - Adult  Goal: Return mobility to safest level of function  6/19/2025 0120 by Lauren King, RN  Outcome: Progressing  6/18/2025 1219 by Ruth Felix RN  Outcome: Progressing     Problem: Skin/Tissue Integrity - Adult  Goal: Skin integrity remains intact  6/19/2025 0120 by Lauren King RN  Outcome: Progressing  Flowsheets (Taken 6/18/2025 2257)  Skin Integrity Remains Intact: Monitor for areas of redness and/or skin breakdown  6/18/2025 1219 by Ruth Felix

## 2025-06-19 NOTE — PROGRESS NOTES
Received pre HD report from NIURKA King RN.  Pt in bed, A+O x4, on room air, no s/s of distress noted.  RN accessed left IJ TDC per protocol.  Tx initiated at 0439.  TDC flowing with ease.  For hemodynamic stability UF goal 2500 ml.  Offered assistance with repositioning every hour.  Vascular access visible at all times during treatment, line connections intact at all times.  Tx completed at 0811, tolerated fairly well. 2L removed.  De-accessed per protocol.  Heparin indwell 1.8ml in arterial, and 1.8ml in venous catheter.  Unit nurse given report.

## 2025-06-20 LAB
ALBUMIN SERPL-MCNC: 2.4 G/DL (ref 3.4–5)
ANION GAP SERPL CALC-SCNC: 15 MMOL/L (ref 3–18)
BUN SERPL-MCNC: 29 MG/DL (ref 6–23)
BUN/CREAT SERPL: 4 (ref 12–20)
CALCIUM SERPL-MCNC: 7.8 MG/DL (ref 8.5–10.1)
CHLORIDE SERPL-SCNC: 96 MMOL/L (ref 98–107)
CO2 SERPL-SCNC: 24 MMOL/L (ref 21–32)
CREAT SERPL-MCNC: 6.83 MG/DL (ref 0.6–1.3)
ERYTHROCYTE [DISTWIDTH] IN BLOOD BY AUTOMATED COUNT: 19.5 % (ref 11.6–14.5)
GLUCOSE BLD STRIP.AUTO-MCNC: 112 MG/DL (ref 70–110)
GLUCOSE BLD STRIP.AUTO-MCNC: 140 MG/DL (ref 70–110)
GLUCOSE BLD STRIP.AUTO-MCNC: 252 MG/DL (ref 70–110)
GLUCOSE BLD STRIP.AUTO-MCNC: 66 MG/DL (ref 70–110)
GLUCOSE BLD STRIP.AUTO-MCNC: 75 MG/DL (ref 70–110)
GLUCOSE BLD STRIP.AUTO-MCNC: 81 MG/DL (ref 70–110)
GLUCOSE SERPL-MCNC: 68 MG/DL (ref 74–108)
HCT VFR BLD AUTO: 23.5 % (ref 36–48)
HGB BLD-MCNC: 7.6 G/DL (ref 13–16)
MCH RBC QN AUTO: 25.1 PG (ref 24–34)
MCHC RBC AUTO-ENTMCNC: 32.3 G/DL (ref 31–37)
MCV RBC AUTO: 77.6 FL (ref 78–100)
NRBC # BLD: 0 K/UL (ref 0–0.01)
NRBC BLD-RTO: 0 PER 100 WBC
PHOSPHATE SERPL-MCNC: 5.8 MG/DL (ref 2.5–4.9)
PLATELET # BLD AUTO: 179 K/UL (ref 135–420)
PMV BLD AUTO: 11.9 FL (ref 9.2–11.8)
POTASSIUM SERPL-SCNC: 4.4 MMOL/L (ref 3.5–5.5)
RBC # BLD AUTO: 3.03 M/UL (ref 4.35–5.65)
SODIUM SERPL-SCNC: 134 MMOL/L (ref 136–145)
WBC # BLD AUTO: 6.6 K/UL (ref 4.6–13.2)

## 2025-06-20 PROCEDURE — 2580000003 HC RX 258: Performed by: INTERNAL MEDICINE

## 2025-06-20 PROCEDURE — 6360000002 HC RX W HCPCS: Performed by: INTERNAL MEDICINE

## 2025-06-20 PROCEDURE — 2500000003 HC RX 250 WO HCPCS: Performed by: INTERNAL MEDICINE

## 2025-06-20 PROCEDURE — 6370000000 HC RX 637 (ALT 250 FOR IP): Performed by: INTERNAL MEDICINE

## 2025-06-20 PROCEDURE — 85027 COMPLETE CBC AUTOMATED: CPT

## 2025-06-20 PROCEDURE — 80069 RENAL FUNCTION PANEL: CPT

## 2025-06-20 PROCEDURE — 36415 COLL VENOUS BLD VENIPUNCTURE: CPT

## 2025-06-20 PROCEDURE — 6370000000 HC RX 637 (ALT 250 FOR IP): Performed by: HOSPITALIST

## 2025-06-20 PROCEDURE — 2500000003 HC RX 250 WO HCPCS

## 2025-06-20 PROCEDURE — 90935 HEMODIALYSIS ONE EVALUATION: CPT

## 2025-06-20 PROCEDURE — 1100000003 HC PRIVATE W/ TELEMETRY

## 2025-06-20 PROCEDURE — 82962 GLUCOSE BLOOD TEST: CPT

## 2025-06-20 PROCEDURE — 2580000003 HC RX 258: Performed by: HOSPITALIST

## 2025-06-20 RX ORDER — GLUCAGON 1 MG/ML
1 KIT INJECTION PRN
Status: DISCONTINUED | OUTPATIENT
Start: 2025-06-20 | End: 2025-06-21 | Stop reason: HOSPADM

## 2025-06-20 RX ORDER — DEXTROSE MONOHYDRATE 100 MG/ML
INJECTION, SOLUTION INTRAVENOUS CONTINUOUS PRN
Status: DISCONTINUED | OUTPATIENT
Start: 2025-06-20 | End: 2025-06-21 | Stop reason: HOSPADM

## 2025-06-20 RX ORDER — DEXTROSE MONOHYDRATE 25 G/50ML
50 INJECTION, SOLUTION INTRAVENOUS PRN
Status: DISCONTINUED | OUTPATIENT
Start: 2025-06-20 | End: 2025-06-21 | Stop reason: HOSPADM

## 2025-06-20 RX ORDER — INSULIN LISPRO 100 [IU]/ML
0-4 INJECTION, SOLUTION INTRAVENOUS; SUBCUTANEOUS
Status: DISCONTINUED | OUTPATIENT
Start: 2025-06-20 | End: 2025-06-21 | Stop reason: HOSPADM

## 2025-06-20 RX ORDER — CYCLOBENZAPRINE HCL 10 MG
5 TABLET ORAL 3 TIMES DAILY PRN
Status: DISCONTINUED | OUTPATIENT
Start: 2025-06-20 | End: 2025-06-21 | Stop reason: HOSPADM

## 2025-06-20 RX ORDER — GLUCAGON 1 MG/ML
1 KIT INJECTION PRN
Status: DISCONTINUED | OUTPATIENT
Start: 2025-06-20 | End: 2025-06-20 | Stop reason: SDUPTHER

## 2025-06-20 RX ORDER — DEXTROSE MONOHYDRATE 50 MG/ML
INJECTION, SOLUTION INTRAVENOUS CONTINUOUS
Status: DISCONTINUED | OUTPATIENT
Start: 2025-06-20 | End: 2025-06-21 | Stop reason: HOSPADM

## 2025-06-20 RX ORDER — OXYCODONE HYDROCHLORIDE 5 MG/1
10 TABLET ORAL EVERY 4 HOURS PRN
Status: DISCONTINUED | OUTPATIENT
Start: 2025-06-20 | End: 2025-06-21 | Stop reason: HOSPADM

## 2025-06-20 RX ORDER — DEXTROSE MONOHYDRATE 100 MG/ML
INJECTION, SOLUTION INTRAVENOUS CONTINUOUS PRN
Status: DISCONTINUED | OUTPATIENT
Start: 2025-06-20 | End: 2025-06-20 | Stop reason: SDUPTHER

## 2025-06-20 RX ORDER — DEXTROSE MONOHYDRATE 25 G/50ML
INJECTION, SOLUTION INTRAVENOUS
Status: COMPLETED
Start: 2025-06-20 | End: 2025-06-20

## 2025-06-20 RX ORDER — SULFAMETHOXAZOLE AND TRIMETHOPRIM 800; 160 MG/1; MG/1
1 TABLET ORAL NIGHTLY
Qty: 7 TABLET | Refills: 0 | Status: SHIPPED | OUTPATIENT
Start: 2025-06-20 | End: 2025-06-27

## 2025-06-20 RX ADMIN — AMOXICILLIN AND CLAVULANATE POTASSIUM 1 TABLET: 500; 125 TABLET, FILM COATED ORAL at 09:05

## 2025-06-20 RX ADMIN — SODIUM CHLORIDE, PRESERVATIVE FREE 10 ML: 5 INJECTION INTRAVENOUS at 09:06

## 2025-06-20 RX ADMIN — DEXTROSE: 5 SOLUTION INTRAVENOUS at 11:31

## 2025-06-20 RX ADMIN — HYDROMORPHONE HYDROCHLORIDE 2 MG: 2 INJECTION, SOLUTION INTRAMUSCULAR; INTRAVENOUS; SUBCUTANEOUS at 09:05

## 2025-06-20 RX ADMIN — CYCLOBENZAPRINE 5 MG: 10 TABLET, FILM COATED ORAL at 11:27

## 2025-06-20 RX ADMIN — DEXTROSE MONOHYDRATE 50 G: 25 INJECTION, SOLUTION INTRAVENOUS at 11:08

## 2025-06-20 RX ADMIN — CALCITRIOL CAPSULES 0.25 MCG 0.5 MCG: 0.25 CAPSULE ORAL at 09:05

## 2025-06-20 RX ADMIN — ACETAMINOPHEN 500 MG: 500 TABLET ORAL at 09:05

## 2025-06-20 RX ADMIN — LABETALOL HCL 200 MG: 200 TABLET, FILM COATED ORAL at 05:34

## 2025-06-20 RX ADMIN — OXYCODONE 10 MG: 5 TABLET ORAL at 11:27

## 2025-06-20 RX ADMIN — DAPTOMYCIN 450 MG: 500 INJECTION, POWDER, LYOPHILIZED, FOR SOLUTION INTRAVENOUS at 20:31

## 2025-06-20 RX ADMIN — BISACODYL 5 MG: 5 TABLET, COATED ORAL at 09:05

## 2025-06-20 RX ADMIN — HYDRALAZINE HYDROCHLORIDE 50 MG: 25 TABLET ORAL at 05:34

## 2025-06-20 RX ADMIN — METOCLOPRAMIDE 5 MG: 5 TABLET ORAL at 05:34

## 2025-06-20 RX ADMIN — CINACALCET HYDROCHLORIDE 90 MG: 30 TABLET, FILM COATED ORAL at 09:05

## 2025-06-20 RX ADMIN — AMLODIPINE BESYLATE 5 MG: 5 TABLET ORAL at 09:05

## 2025-06-20 ASSESSMENT — PAIN SCALES - GENERAL
PAINLEVEL_OUTOF10: 10
PAINLEVEL_OUTOF10: 10
PAINLEVEL_OUTOF10: 0
PAINLEVEL_OUTOF10: 10
PAINLEVEL_OUTOF10: 10
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
## 2025-06-20 NOTE — PROGRESS NOTES
Patient to DC home after dialysis. Patient needing lyft home. [facility] notified of DC and orders placed. Patient no longer drives and has transport to and from dialysis center. SW to follow.

## 2025-06-20 NOTE — PROGRESS NOTES
Hemodialysis at bedside. 3 hrs completed per pt's request. Vss during tx. 2 K+ and 2.5 dialysate used today. L IJ access patent. 3 Liters removed. Heparin locked. No c/o cramping or discomfort related to dialysis. Post HD report given to LATRICE Kuo RN.
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
## 2025-06-20 NOTE — PROGRESS NOTES
Physical Therapy Treatment Attempt    Chart reviewed. Attempted Physical Therapy Treatment, however, patient unable to be seen due to:  []  Nausea/vomiting  []  Eating  []  Pain  []  Patient too lethargic  []  Off Unit for testing/procedure  [x]  Dialysis treatment in progress   []  Patient refused, despite encouragement and education.  []  Other:     Will follow up as patient's schedule allows.   Shreya Burns, PT, DPT

## 2025-06-20 NOTE — PLAN OF CARE
Problem: Chronic Conditions and Co-morbidities  Goal: Patient's chronic conditions and co-morbidity symptoms are monitored and maintained or improved  6/19/2025 2219 by Clay Pichardo RN  Outcome: Progressing  6/19/2025 1311 by Sean Kuo RN  Outcome: Progressing     Problem: Pain  Goal: Verbalizes/displays adequate comfort level or baseline comfort level  6/19/2025 2219 by Clay Pichardo RN  Outcome: Progressing  6/19/2025 1311 by Sean Kuo RN  Outcome: Progressing     Problem: Safety - Adult  Goal: Free from fall injury  6/19/2025 2219 by Clay Pichardo RN  Outcome: Progressing  6/19/2025 1311 by Sean Kuo RN  Outcome: Progressing     Problem: Skin/Tissue Integrity - Adult  Goal: Skin integrity remains intact  6/19/2025 2219 by Clay Pichardo RN  Outcome: Progressing  Flowsheets (Taken 6/19/2025 2000)  Skin Integrity Remains Intact:   Monitor for areas of redness and/or skin breakdown   Assess vascular access sites hourly   Every 4-6 hours minimum:  Change oxygen saturation probe site   Turn and reposition as indicated  6/19/2025 1311 by Sean Kuo RN  Outcome: Progressing  Goal: Oral mucous membranes remain intact  6/19/2025 2219 by Clay Pichardo RN  Outcome: Progressing  6/19/2025 1311 by Sean Kuo RN  Outcome: Progressing

## 2025-06-20 NOTE — PROGRESS NOTES
RENAL CONSULT PROGRESS NOTE   2025    Patient:  Evan Huizar  :  1970  Gender:  male  MRN #:  [id number]    Reason for Consult: bacteremia and ESRD related care     Subjective/relevant events  When I saw him this morning he was kept NPO overnight  , looks confused/lethargic   Thought he has hypoglycemia , asked nurse to give juice/food and recheck blood sugar  Bp stable    Patient says he is hungry     Objective:    /62   Pulse 78   Temp 97.3 °F (36.3 °C) (Oral)   Resp 24   Ht 1.702 m (5' 7.01\")   Wt 75.2 kg (165 lb 12.6 oz)   SpO2 95%   BMI 25.96 kg/m²     Physical Exam:    Pt seems confused     Lung: clear to auscultation  No edema       Laboratory Data:    Lab Results   Component Value Date    BUN 29 (H) 2025    BUN 44 (H) 2025    BUN 46 (H) 2025     (L) 2025     (L) 2025     (L) 2025    CO2 24 2025    CO2 23 2025    CO2 23 2025    GLUCOSE 68 (L) 2025    GLUCOSE 72 (L) 2025    GLUCOSE 74 2025     Lab Results   Component Value Date    WBC 6.6 2025    HGB 7.6 (L) 2025    HCT 23.5 (L) 2025     Lab Results   Component Value Date    CALCIUM 7.8 (L) 2025     Lab Results   Component Value Date    HDL 62 (H) 2022     No results found for: \"TURBIDITY\"    Imaging Reveiwed:      EKG  Renal ultrasound  Xray chest-    IMPRESSION:  1. Enlarged cardiac silhouette, otherwise no acute disease      Assessment:  Evan Huizar is a 54 y.o. year old male  with  ESRD on HD,  DM-2, anemia, uncontrolled hypertension , hyperphosphatemia,  non compliant to dialysis prescription, dietary/life style modification came to hospital emergency as TDC fell  off  Wound culture and blood culture was sent from dialysis unit on  both grew staph epidermidis   Details in in patient chart S/P IV daptomycin and new TDC  Recent blood culture is negative     ESRD  Non compliant   Uncontrolled hypertension

## 2025-06-20 NOTE — PLAN OF CARE
Problem: Chronic Conditions and Co-morbidities  Goal: Patient's chronic conditions and co-morbidity symptoms are monitored and maintained or improved  Outcome: Progressing     Problem: Discharge Planning  Goal: Discharge to home or other facility with appropriate resources  Outcome: Progressing     Problem: Pain  Goal: Verbalizes/displays adequate comfort level or baseline comfort level  Outcome: Progressing     Problem: Safety - Adult  Goal: Free from fall injury  Outcome: Progressing     Problem: Neurosensory - Adult  Goal: Achieves stable or improved neurological status  Outcome: Progressing  Goal: Achieves maximal functionality and self care  Outcome: Progressing     Problem: Respiratory - Adult  Goal: Achieves optimal ventilation and oxygenation  Outcome: Progressing     Problem: Musculoskeletal - Adult  Goal: Return mobility to safest level of function  Outcome: Progressing  Goal: Return ADL status to a safe level of function  Outcome: Progressing     Problem: Gastrointestinal - Adult  Goal: Minimal or absence of nausea and vomiting  Outcome: Progressing  Goal: Maintains adequate nutritional intake  Outcome: Progressing     Problem: Infection - Adult  Goal: Absence of fever/infection during anticipated neutropenic period  Outcome: Progressing     Problem: Metabolic/Fluid and Electrolytes - Adult  Goal: Electrolytes maintained within normal limits  Outcome: Progressing  Goal: Hemodynamic stability and optimal renal function maintained  Outcome: Progressing  Goal: Glucose maintained within prescribed range  Outcome: Progressing     Problem: Hematologic - Adult  Goal: Maintains hematologic stability  Outcome: Progressing     Problem: Skin/Tissue Integrity - Adult  Goal: Skin integrity remains intact  Description: 1.  Monitor for areas of redness and/or skin breakdown  2.  Assess vascular access sites hourly  3.  Every 4-6 hours minimum:  Change oxygen saturation probe site  4.  Every 4-6 hours:  If on nasal

## 2025-06-20 NOTE — PROGRESS NOTES
Pre-dialysis handoff received from LATRICE Kuo RN. L IJ access patent. Bs stable ok to start. This RN wearing PPE.

## 2025-06-20 NOTE — PROGRESS NOTES
Patient accidentally took his TDC dressing off in his sleep, sutures were intact, new dressing placed on.

## 2025-06-21 VITALS
WEIGHT: 165.79 LBS | HEART RATE: 88 BPM | OXYGEN SATURATION: 98 % | SYSTOLIC BLOOD PRESSURE: 154 MMHG | RESPIRATION RATE: 16 BRPM | DIASTOLIC BLOOD PRESSURE: 65 MMHG | BODY MASS INDEX: 26.02 KG/M2 | HEIGHT: 67 IN | TEMPERATURE: 99 F

## 2025-06-21 LAB
ALBUMIN SERPL-MCNC: 2.5 G/DL (ref 3.4–5)
ANION GAP SERPL CALC-SCNC: 13 MMOL/L (ref 3–18)
BACTERIA SPEC CULT: NORMAL
BUN SERPL-MCNC: 15 MG/DL (ref 6–23)
BUN/CREAT SERPL: 3 (ref 12–20)
CALCIUM SERPL-MCNC: 8.1 MG/DL (ref 8.5–10.1)
CHLORIDE SERPL-SCNC: 98 MMOL/L (ref 98–107)
CO2 SERPL-SCNC: 24 MMOL/L (ref 21–32)
CREAT SERPL-MCNC: 4.7 MG/DL (ref 0.6–1.3)
ERYTHROCYTE [DISTWIDTH] IN BLOOD BY AUTOMATED COUNT: 19.9 % (ref 11.6–14.5)
GLUCOSE BLD STRIP.AUTO-MCNC: 82 MG/DL (ref 70–110)
GLUCOSE SERPL-MCNC: 90 MG/DL (ref 74–108)
HCT VFR BLD AUTO: 25.5 % (ref 36–48)
HGB BLD-MCNC: 8.2 G/DL (ref 13–16)
MCH RBC QN AUTO: 25.2 PG (ref 24–34)
MCHC RBC AUTO-ENTMCNC: 32.2 G/DL (ref 31–37)
MCV RBC AUTO: 78.2 FL (ref 78–100)
NRBC # BLD: 0 K/UL (ref 0–0.01)
NRBC BLD-RTO: 0 PER 100 WBC
PHOSPHATE SERPL-MCNC: 4.3 MG/DL (ref 2.5–4.9)
PLATELET # BLD AUTO: 185 K/UL (ref 135–420)
PMV BLD AUTO: 10.2 FL (ref 9.2–11.8)
POTASSIUM SERPL-SCNC: 5 MMOL/L (ref 3.5–5.5)
RBC # BLD AUTO: 3.26 M/UL (ref 4.35–5.65)
SERVICE CMNT-IMP: NORMAL
SODIUM SERPL-SCNC: 134 MMOL/L (ref 136–145)
WBC # BLD AUTO: 8.3 K/UL (ref 4.6–13.2)

## 2025-06-21 PROCEDURE — 80069 RENAL FUNCTION PANEL: CPT

## 2025-06-21 PROCEDURE — 2580000003 HC RX 258: Performed by: HOSPITALIST

## 2025-06-21 PROCEDURE — 82962 GLUCOSE BLOOD TEST: CPT

## 2025-06-21 PROCEDURE — 36415 COLL VENOUS BLD VENIPUNCTURE: CPT

## 2025-06-21 PROCEDURE — 6370000000 HC RX 637 (ALT 250 FOR IP): Performed by: INTERNAL MEDICINE

## 2025-06-21 PROCEDURE — 85027 COMPLETE CBC AUTOMATED: CPT

## 2025-06-21 RX ORDER — OXYCODONE HYDROCHLORIDE 5 MG/1
5 TABLET ORAL
Qty: 20 TABLET | Refills: 0 | Status: SHIPPED | OUTPATIENT
Start: 2025-06-21 | End: 2025-06-26

## 2025-06-21 RX ADMIN — HYDRALAZINE HYDROCHLORIDE 50 MG: 25 TABLET ORAL at 03:09

## 2025-06-21 RX ADMIN — DEXTROSE: 5 SOLUTION INTRAVENOUS at 03:14

## 2025-06-21 RX ADMIN — AMOXICILLIN AND CLAVULANATE POTASSIUM 1 TABLET: 500; 125 TABLET, FILM COATED ORAL at 10:26

## 2025-06-21 ASSESSMENT — PAIN SCALES - GENERAL: PAINLEVEL_OUTOF10: 0

## 2025-06-21 NOTE — PROGRESS NOTES
RENAL CONSULT PROGRESS NOTE   2025    Patient:  Evan Huizar  :  1970  Gender:  male  MRN #:  [id number]    Reason for Consult: bacteremia and ESRD related care     Subjective/relevant events  Awake and alert today  No nausea/vomiting  Mental status improved  BP stable       Objective:    BP (!) 154/65   Pulse 88   Temp 99 °F (37.2 °C) (Axillary)   Resp 16   Ht 1.702 m (5' 7.01\")   Wt 75.2 kg (165 lb 12.6 oz)   SpO2 98%   BMI 25.96 kg/m²     Physical Exam:    Pt awake and alert today   Lung: clear to auscultation  No edema       Laboratory Data:    Lab Results   Component Value Date    BUN 15 2025    BUN 29 (H) 2025    BUN 44 (H) 2025     (L) 2025     (L) 2025     (L) 2025    CO2 24 2025    CO2 24 2025    CO2 23 2025    GLUCOSE 90 2025    GLUCOSE 68 (L) 2025    GLUCOSE 72 (L) 2025     Lab Results   Component Value Date    WBC 8.3 2025    HGB 8.2 (L) 2025    HCT 25.5 (L) 2025     Lab Results   Component Value Date    CALCIUM 8.1 (L) 2025     Lab Results   Component Value Date    HDL 62 (H) 2022     No results found for: \"TURBIDITY\"    Imaging Reveiwed:      EKG  Renal ultrasound  Xray chest-    IMPRESSION:  1. Enlarged cardiac silhouette, otherwise no acute disease      Assessment:  Evan Huizar is a 54 y.o. year old male  with  ESRD on HD,  DM-2, anemia, uncontrolled hypertension , hyperphosphatemia,  non compliant to dialysis prescription, dietary/life style modification came to hospital emergency as TDC fell  off  Wound culture and blood culture was sent from dialysis unit on  both grew staph epidermidis   Details in in patient chart S/P IV daptomycin and new TDC  Recent blood culture is negative     ESRD  Non compliant   Uncontrolled hypertension   Hyperphosphatemia   Bacteremia  Possible hypoglycemia          Plan:    No clinical and metabolic indication of dialysis

## 2025-06-21 NOTE — PLAN OF CARE
Problem: Chronic Conditions and Co-morbidities  Goal: Patient's chronic conditions and co-morbidity symptoms are monitored and maintained or improved  Outcome: Adequate for Discharge     Problem: Discharge Planning  Goal: Discharge to home or other facility with appropriate resources  Outcome: Adequate for Discharge     Problem: Pain  Goal: Verbalizes/displays adequate comfort level or baseline comfort level  Outcome: Adequate for Discharge     Problem: Safety - Adult  Goal: Free from fall injury  Outcome: Adequate for Discharge     Problem: Neurosensory - Adult  Goal: Achieves stable or improved neurological status  Outcome: Adequate for Discharge  Goal: Achieves maximal functionality and self care  Outcome: Adequate for Discharge     Problem: Respiratory - Adult  Goal: Achieves optimal ventilation and oxygenation  Outcome: Adequate for Discharge     Problem: Musculoskeletal - Adult  Goal: Return mobility to safest level of function  Outcome: Adequate for Discharge  Goal: Return ADL status to a safe level of function  Outcome: Adequate for Discharge     Problem: Gastrointestinal - Adult  Goal: Minimal or absence of nausea and vomiting  Outcome: Adequate for Discharge  Goal: Maintains adequate nutritional intake  Outcome: Adequate for Discharge     Problem: Infection - Adult  Goal: Absence of fever/infection during anticipated neutropenic period  Outcome: Adequate for Discharge     Problem: Metabolic/Fluid and Electrolytes - Adult  Goal: Electrolytes maintained within normal limits  Outcome: Adequate for Discharge  Goal: Hemodynamic stability and optimal renal function maintained  Outcome: Adequate for Discharge  Goal: Glucose maintained within prescribed range  Outcome: Adequate for Discharge     Problem: Hematologic - Adult  Goal: Maintains hematologic stability  Outcome: Adequate for Discharge     Problem: Skin/Tissue Integrity - Adult  Goal: Skin integrity remains intact  Description: 1.  Monitor for areas of  redness and/or skin breakdown  2.  Assess vascular access sites hourly  3.  Every 4-6 hours minimum:  Change oxygen saturation probe site  4.  Every 4-6 hours:  If on nasal continuous positive airway pressure, respiratory therapy assess nares and determine need for appliance change or resting period  Outcome: Adequate for Discharge  Goal: Oral mucous membranes remain intact  Outcome: Adequate for Discharge     Problem: Genitourinary - Adult  Goal: Absence of urinary retention  Outcome: Adequate for Discharge     Problem: Skin/Tissue Integrity  Goal: Skin integrity remains intact  Description: 1.  Monitor for areas of redness and/or skin breakdown  2.  Assess vascular access sites hourly  3.  Every 4-6 hours minimum:  Change oxygen saturation probe site  4.  Every 4-6 hours:  If on nasal continuous positive airway pressure, respiratory therapy assess nares and determine need for appliance change or resting period  Outcome: Adequate for Discharge
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
## 2025-06-25 ENCOUNTER — APPOINTMENT (OUTPATIENT)
Facility: HOSPITAL | Age: 55
End: 2025-06-25
Payer: MEDICARE

## 2025-06-25 ENCOUNTER — HOSPITAL ENCOUNTER (EMERGENCY)
Facility: HOSPITAL | Age: 55
Discharge: HOME | End: 2025-06-26
Attending: EMERGENCY MEDICINE
Payer: MEDICARE

## 2025-06-25 DIAGNOSIS — E87.5 HYPERKALEMIA: ICD-10-CM

## 2025-06-25 DIAGNOSIS — N18.6 ESRD ON HEMODIALYSIS (HCC): ICD-10-CM

## 2025-06-25 DIAGNOSIS — Z99.2 ESRD ON HEMODIALYSIS (HCC): ICD-10-CM

## 2025-06-25 DIAGNOSIS — J81.0 ACUTE PULMONARY EDEMA (HCC): Primary | ICD-10-CM

## 2025-06-25 LAB
ALBUMIN SERPL-MCNC: 2.8 G/DL (ref 3.4–5)
ALBUMIN/GLOB SERPL: 0.6 (ref 0.8–1.7)
ALP SERPL-CCNC: 100 U/L (ref 45–117)
ALT SERPL-CCNC: <5 U/L (ref 10–50)
ANION GAP SERPL CALC-SCNC: 15 MMOL/L (ref 3–18)
AST SERPL-CCNC: 14 U/L (ref 10–38)
BASOPHILS # BLD: 0.03 K/UL (ref 0–0.1)
BASOPHILS NFR BLD: 0.3 % (ref 0–2)
BILIRUB SERPL-MCNC: 0.5 MG/DL (ref 0.2–1)
BUN SERPL-MCNC: 56 MG/DL (ref 6–23)
BUN/CREAT SERPL: 5 (ref 12–20)
CALCIUM SERPL-MCNC: 6.9 MG/DL (ref 8.5–10.1)
CHLORIDE SERPL-SCNC: 97 MMOL/L (ref 98–107)
CO2 SERPL-SCNC: 21 MMOL/L (ref 21–32)
CREAT SERPL-MCNC: 10.2 MG/DL (ref 0.6–1.3)
DIFFERENTIAL METHOD BLD: ABNORMAL
EOSINOPHIL # BLD: 0.4 K/UL (ref 0–0.4)
EOSINOPHIL NFR BLD: 4.1 % (ref 0–5)
ERYTHROCYTE [DISTWIDTH] IN BLOOD BY AUTOMATED COUNT: 19.8 % (ref 11.6–14.5)
GLOBULIN SER CALC-MCNC: 4.7 G/DL (ref 2–4)
GLUCOSE BLD STRIP.AUTO-MCNC: 101 MG/DL (ref 70–110)
GLUCOSE BLD STRIP.AUTO-MCNC: 171 MG/DL (ref 70–110)
GLUCOSE BLD STRIP.AUTO-MCNC: 183 MG/DL (ref 70–110)
GLUCOSE BLD STRIP.AUTO-MCNC: 194 MG/DL (ref 70–110)
GLUCOSE BLD STRIP.AUTO-MCNC: 26 MG/DL (ref 70–110)
GLUCOSE BLD STRIP.AUTO-MCNC: 263 MG/DL (ref 70–110)
GLUCOSE BLD STRIP.AUTO-MCNC: 299 MG/DL (ref 70–110)
GLUCOSE BLD STRIP.AUTO-MCNC: 62 MG/DL (ref 70–110)
GLUCOSE SERPL-MCNC: 63 MG/DL (ref 74–108)
HCT VFR BLD AUTO: 23.2 % (ref 36–48)
HGB BLD-MCNC: 7.5 G/DL (ref 13–16)
IMM GRANULOCYTES # BLD AUTO: 0.04 K/UL (ref 0–0.04)
IMM GRANULOCYTES NFR BLD AUTO: 0.4 % (ref 0–0.5)
LYMPHOCYTES # BLD: 0.22 K/UL (ref 0.9–3.3)
LYMPHOCYTES NFR BLD: 2.3 % (ref 21–52)
MAGNESIUM SERPL-MCNC: 2.8 MG/DL (ref 1.6–2.6)
MCH RBC QN AUTO: 25.3 PG (ref 24–34)
MCHC RBC AUTO-ENTMCNC: 32.3 G/DL (ref 31–37)
MCV RBC AUTO: 78.1 FL (ref 78–100)
MONOCYTES # BLD: 0.94 K/UL (ref 0.05–1.2)
MONOCYTES NFR BLD: 9.8 % (ref 3–10)
NEUTS SEG # BLD: 8.01 K/UL (ref 1.8–8)
NEUTS SEG NFR BLD: 83.1 % (ref 40–73)
NRBC # BLD: 0 K/UL (ref 0–0.01)
NRBC BLD-RTO: 0 PER 100 WBC
NT PRO BNP: ABNORMAL PG/ML (ref 36–900)
PLATELET # BLD AUTO: 149 K/UL (ref 135–420)
PMV BLD AUTO: 9.7 FL (ref 9.2–11.8)
POTASSIUM SERPL-SCNC: 7.1 MMOL/L (ref 3.5–5.5)
PROT SERPL-MCNC: 7.5 G/DL (ref 6.4–8.2)
RBC # BLD AUTO: 2.97 M/UL (ref 4.35–5.65)
SODIUM SERPL-SCNC: 133 MMOL/L (ref 136–145)
TROPONIN T SERPL HS-MCNC: 83.4 NG/L (ref 0–22)
WBC # BLD AUTO: 9.6 K/UL (ref 4.6–13.2)

## 2025-06-25 PROCEDURE — 96375 TX/PRO/DX INJ NEW DRUG ADDON: CPT

## 2025-06-25 PROCEDURE — 82962 GLUCOSE BLOOD TEST: CPT

## 2025-06-25 PROCEDURE — 83880 ASSAY OF NATRIURETIC PEPTIDE: CPT

## 2025-06-25 PROCEDURE — 99285 EMERGENCY DEPT VISIT HI MDM: CPT

## 2025-06-25 PROCEDURE — 93005 ELECTROCARDIOGRAM TRACING: CPT | Performed by: STUDENT IN AN ORGANIZED HEALTH CARE EDUCATION/TRAINING PROGRAM

## 2025-06-25 PROCEDURE — 83735 ASSAY OF MAGNESIUM: CPT

## 2025-06-25 PROCEDURE — 80053 COMPREHEN METABOLIC PANEL: CPT

## 2025-06-25 PROCEDURE — 71045 X-RAY EXAM CHEST 1 VIEW: CPT

## 2025-06-25 PROCEDURE — G0257 UNSCHED DIALYSIS ESRD PT HOS: HCPCS

## 2025-06-25 PROCEDURE — 96374 THER/PROPH/DIAG INJ IV PUSH: CPT

## 2025-06-25 PROCEDURE — 2500000003 HC RX 250 WO HCPCS: Performed by: STUDENT IN AN ORGANIZED HEALTH CARE EDUCATION/TRAINING PROGRAM

## 2025-06-25 PROCEDURE — 84484 ASSAY OF TROPONIN QUANT: CPT

## 2025-06-25 PROCEDURE — 6360000002 HC RX W HCPCS: Performed by: STUDENT IN AN ORGANIZED HEALTH CARE EDUCATION/TRAINING PROGRAM

## 2025-06-25 PROCEDURE — 93005 ELECTROCARDIOGRAM TRACING: CPT | Performed by: EMERGENCY MEDICINE

## 2025-06-25 PROCEDURE — 85025 COMPLETE CBC W/AUTO DIFF WBC: CPT

## 2025-06-25 PROCEDURE — 90935 HEMODIALYSIS ONE EVALUATION: CPT

## 2025-06-25 PROCEDURE — 6370000000 HC RX 637 (ALT 250 FOR IP): Performed by: STUDENT IN AN ORGANIZED HEALTH CARE EDUCATION/TRAINING PROGRAM

## 2025-06-25 PROCEDURE — 2580000003 HC RX 258: Performed by: STUDENT IN AN ORGANIZED HEALTH CARE EDUCATION/TRAINING PROGRAM

## 2025-06-25 PROCEDURE — 96361 HYDRATE IV INFUSION ADD-ON: CPT

## 2025-06-25 PROCEDURE — 2580000003 HC RX 258

## 2025-06-25 RX ORDER — MIDAZOLAM HYDROCHLORIDE 1 MG/ML
INJECTION, SOLUTION INTRAMUSCULAR; INTRAVENOUS
Status: DISCONTINUED
Start: 2025-06-25 | End: 2025-06-25 | Stop reason: WASHOUT

## 2025-06-25 RX ORDER — LORAZEPAM 2 MG/ML
2 INJECTION INTRAMUSCULAR ONCE
Status: DISCONTINUED | OUTPATIENT
Start: 2025-06-25 | End: 2025-06-26 | Stop reason: HOSPADM

## 2025-06-25 RX ORDER — MIDAZOLAM HYDROCHLORIDE 2 MG/2ML
5 INJECTION, SOLUTION INTRAMUSCULAR; INTRAVENOUS ONCE
Status: DISCONTINUED | OUTPATIENT
Start: 2025-06-25 | End: 2025-06-25

## 2025-06-25 RX ORDER — LABETALOL HYDROCHLORIDE 5 MG/ML
10 INJECTION, SOLUTION INTRAVENOUS ONCE
Status: COMPLETED | OUTPATIENT
Start: 2025-06-25 | End: 2025-06-25

## 2025-06-25 RX ORDER — DEXTROSE MONOHYDRATE 100 MG/ML
INJECTION, SOLUTION INTRAVENOUS
Status: COMPLETED
Start: 2025-06-25 | End: 2025-06-25

## 2025-06-25 RX ORDER — DEXTROSE MONOHYDRATE 100 MG/ML
INJECTION, SOLUTION INTRAVENOUS CONTINUOUS PRN
Status: DISCONTINUED | OUTPATIENT
Start: 2025-06-25 | End: 2025-06-26 | Stop reason: HOSPADM

## 2025-06-25 RX ORDER — ALBUTEROL SULFATE 0.83 MG/ML
10 SOLUTION RESPIRATORY (INHALATION) ONCE
Status: COMPLETED | OUTPATIENT
Start: 2025-06-25 | End: 2025-06-25

## 2025-06-25 RX ORDER — GLUCAGON 1 MG/ML
1 KIT INJECTION PRN
Status: DISCONTINUED | OUTPATIENT
Start: 2025-06-25 | End: 2025-06-26 | Stop reason: HOSPADM

## 2025-06-25 RX ORDER — DEXTROSE MONOHYDRATE 50 MG/ML
INJECTION, SOLUTION INTRAVENOUS CONTINUOUS
Status: DISCONTINUED | OUTPATIENT
Start: 2025-06-25 | End: 2025-06-25

## 2025-06-25 RX ORDER — DEXTROSE MONOHYDRATE 25 G/50ML
25 INJECTION, SOLUTION INTRAVENOUS PRN
Status: DISCONTINUED | OUTPATIENT
Start: 2025-06-25 | End: 2025-06-26 | Stop reason: HOSPADM

## 2025-06-25 RX ORDER — CALCIUM GLUCONATE 20 MG/ML
1000 INJECTION, SOLUTION INTRAVENOUS ONCE
Status: COMPLETED | OUTPATIENT
Start: 2025-06-25 | End: 2025-06-25

## 2025-06-25 RX ADMIN — DEXTROSE MONOHYDRATE 25 G: 25 INJECTION, SOLUTION INTRAVENOUS at 18:52

## 2025-06-25 RX ADMIN — LABETALOL HYDROCHLORIDE 10 MG: 5 INJECTION, SOLUTION INTRAVENOUS at 19:05

## 2025-06-25 RX ADMIN — CALCIUM GLUCONATE 1000 MG: 20 INJECTION, SOLUTION INTRAVENOUS at 15:53

## 2025-06-25 RX ADMIN — DEXTROSE 250 ML: 10 SOLUTION INTRAVENOUS at 16:00

## 2025-06-25 RX ADMIN — INSULIN HUMAN 5 UNITS: 100 INJECTION, SOLUTION PARENTERAL at 15:59

## 2025-06-25 RX ADMIN — ALBUTEROL SULFATE 10 MG: 2.5 SOLUTION RESPIRATORY (INHALATION) at 16:01

## 2025-06-25 RX ADMIN — DEXTROSE MONOHYDRATE 1000 ML: 10 INJECTION, SOLUTION INTRAVENOUS at 19:03

## 2025-06-25 NOTE — ED NOTES
Patient resting on the stretcher at this time. Medication infusing per MAR order.     Chest rise and fall noted.     No further complaints at this time.

## 2025-06-25 NOTE — ED TRIAGE NOTES
Pt presents to ED via EMS c/o SOB and hypoglycemia. Pt was found to be lethargic and short of breath on scene. Blood glucose was 50, room air SPO2 85% for EMS. Oral glucose given, pt placed on NRB mask at 15LPM, EMS noted rales in lung sounds, pt declined CPAP. Recheck BG was 74. Pt is a dialysis patient, last dialysis was when pt was discharged from the hospital on 6/21. Pt was supposed to go to dialysis today, but started having symptoms while waiting for his Uber and called 911. Pt has a dialysis catheter placed in his left upper chest that is sutured and does not have a dressing covering the access.

## 2025-06-25 NOTE — PROGRESS NOTES
ESRD with hyperkalemia and fluid overload  Plan for urgent Dialysis  Order placed and informed on call HD nurse  Continue medical management for hyperkalmeia with lokelma , insulin/ dextrose and calcium gluconate until HD start.

## 2025-06-25 NOTE — ED NOTES
Patient's BGL found to be 26, given emergent D50 push dose. BGL pamella to 183, MD made aware. D10 fluids started at 100 per MD order. MD made aware.     Patient resting on the stretcher at this time.     Chest rise and fall noted. BP elevated, MD made aware, one time BP dosage placed. Given per MAR..     No further complaints at this time.

## 2025-06-26 ENCOUNTER — APPOINTMENT (OUTPATIENT)
Facility: HOSPITAL | Age: 55
End: 2025-06-26
Payer: MEDICARE

## 2025-06-26 VITALS
HEIGHT: 67 IN | HEART RATE: 100 BPM | WEIGHT: 165 LBS | OXYGEN SATURATION: 92 % | RESPIRATION RATE: 22 BRPM | SYSTOLIC BLOOD PRESSURE: 181 MMHG | BODY MASS INDEX: 25.9 KG/M2 | DIASTOLIC BLOOD PRESSURE: 102 MMHG | TEMPERATURE: 98 F

## 2025-06-26 PROCEDURE — 73562 X-RAY EXAM OF KNEE 3: CPT

## 2025-06-26 PROCEDURE — 6370000000 HC RX 637 (ALT 250 FOR IP): Performed by: EMERGENCY MEDICINE

## 2025-06-26 PROCEDURE — 73502 X-RAY EXAM HIP UNI 2-3 VIEWS: CPT

## 2025-06-26 RX ORDER — HYDROCODONE BITARTRATE AND ACETAMINOPHEN 5; 325 MG/1; MG/1
1 TABLET ORAL
Refills: 0 | Status: COMPLETED | OUTPATIENT
Start: 2025-06-26 | End: 2025-06-26

## 2025-06-26 RX ORDER — OXYCODONE AND ACETAMINOPHEN 5; 325 MG/1; MG/1
1 TABLET ORAL
Refills: 0 | Status: COMPLETED | OUTPATIENT
Start: 2025-06-26 | End: 2025-06-26

## 2025-06-26 RX ADMIN — HYDROCODONE BITARTRATE AND ACETAMINOPHEN 1 TABLET: 5; 325 TABLET ORAL at 00:25

## 2025-06-26 RX ADMIN — OXYCODONE AND ACETAMINOPHEN 1 TABLET: 5; 325 TABLET ORAL at 01:46

## 2025-06-26 NOTE — ED PROVIDER NOTES
[facility]  EMERGENCY DEPARTMENT ENCOUNTER    Time/Date: 11:05 PM EDT on 6/25/25  Patient Name: Evan Huizar  MRN: [id number]  YOB: 1970  Provider: TIFFANIE Peterson MD am the primary clinician of record.    History of Presenting Illness     Patient was signed out to me by the previous physician, Dr. Solano Please refer to his dictation for more complete history. In summary the patient presents with shortness of breath.  He was found be hyperkalemic.  He was given a hyperkalemic cocktail he became hypoglycemic after that but did respond well to IV dextrose.  He is currently being dialyzed.    The patient is awaiting: Dialysis  Expected plan of care: Discharge    Physical Exam     Vitals:    06/25/25 2310 06/25/25 2315 06/25/25 2357 06/26/25 0045   BP: (!) 233/101 (!) 232/98 (!) 163/102 (!) 181/102   Pulse: 92 93 99 100   Resp:       Temp: 98 °F (36.7 °C)      TempSrc:       SpO2:   99% 92%   Weight:       Height:           Physical Exam  Constitutional: Well nourished, well developed, appears stated age. Alert and oriented.  HEENT: Neck supple without meningismus. PERRLA, no conjunctival injection. EOM intact  Cardiovascular: RRR, Warm, well-perfused extremities  Respiratory: CTAB, Unlabored respiratory effort  Abdominal: Soft, nontender, nondistended, no CVA tenderness  Musculoskeletal: Full range of motion all extremities.  Right BKA  Skin: Warm, dry. No rashes  Vascular: Pulses equal in all 4 extremities.  Neuro: CNs II-XII grossly intact. Sensation and motor function of extremities grossly intact.  Psych: Appropriate mood and affect.     Diagnostic Study Results     LABS:  Recent Results (from the past 120 hours)   POCT Glucose    Collection Time: 06/25/25  2:16 PM   Result Value Ref Range    POC Glucose 62 (L) 70 - 110 mg/dL   EKG 12 Lead    Collection Time: 06/25/25  2:20 PM   Result Value Ref Range    Ventricular Rate 90 BPM    Atrial Rate 90 BPM    P-R Interval 
treatment, and plan.  He verbally conveys understanding and agreement of the signs, symptoms, diagnosis, treatment and prognosis and additionally agrees to follow up as discussed.  He also agrees with the care-plan and conveys that all of his questions have been answered.  I have also provided discharge instructions for him that include: educational information regarding their diagnosis and treatment, and list of reasons why they would want to return to the ED prior to their follow-up appointment, should his condition change. He has been provided with education for proper emergency department utilization.     CLINICAL IMPRESSION:  1. Acute pulmonary edema (HCC)    2. ESRD on hemodialysis (HCC)    3. Hyperkalemia        PLAN:  D/C Home    DISCHARGE MEDICATIONS:  Discharge Medication List as of 6/26/2025 12:22 AM             Details   oxyCODONE (ROXICODONE) 5 MG immediate release tablet Take 1 tablet by mouth every 3 hours as needed for Pain for up to 5 days. Max Daily Amount: 40 mg, Disp-20 tablet, R-0Normal      sulfamethoxazole-trimethoprim (BACTRIM DS;SEPTRA DS) 800-160 MG per tablet Take 1 tablet by mouth at bedtime for 7 days, Disp-7 tablet, R-0Normal      sertraline (ZOLOFT) 50 MG tablet Take 1.5 tablets by mouth dailyHistorical Med      hydrALAZINE (APRESOLINE) 100 MG tablet Take 1 tablet by mouth every 8 hours, Disp-90 tablet, R-0Normal      labetalol (NORMODYNE) 300 MG tablet Take 1 tablet by mouth every 12 hours, Disp-60 tablet, R-3Normal      amLODIPine (NORVASC) 10 MG tablet Take 1 tablet by mouth daily, Disp-30 tablet, R-0Normal      cinacalcet (SENSIPAR) 90 MG tablet Take 1 tablet by mouth daily, Disp-30 tablet, R-3Normal      blood glucose monitor kit and supplies Dispense sufficient amount for indicated testing frequency plus additional to accommodate PRN testing needs. Dispense all needed supplies to include: monitor, strips, lancing device, lancets, control solutions, alcohol swabs., Disp-1 kit,

## 2025-06-26 NOTE — PROGRESS NOTES
1911: Per pre-report; pt's BG 26, pt was given emergent D50 push. BG increased to 183. OK to start HD.     Received pre HD report from PATRICK Olivia RN.  Pt in bed, A+O x, on O2 via NC @ 15L NRB, no s/s of distress noted.  RN accessed right side CVC per protocol.  Tx initiated at 1954.  TDC flowing with ease.  For hemodynamic stability UF goal 3500 ml.      2130: BP increased 227/94 HR 83. Primary RN and MD notified, will treat BP post-dialysis.     Offered assistance with repositioning every hour.  Vascular access visible at all times during treatment, line connections intact at all times.  Tx completed at 2303, tolerated fairly well, 3L removed.  De-accessed per protocol. New CHG dressing placed to left chest and dated for 6/25/25.  Unit nurse given report.

## 2025-06-26 NOTE — PLAN OF CARE
Problem: Chronic Conditions and Co-morbidities  Goal: Patient's chronic conditions and co-morbidity symptoms are monitored and maintained or improved  Outcome: Progressing     INTERVENTION:  HEMODYNAMIC STABILIZATION  MAINTAIN BP WNL WHILE ON HD.     INTERVENTION:  FLUID MANAGEMENT  WILL ATTEMPT 3000 ML TOTAL FLUID REMOVAL AS TOLERATED.     INTERVENTION:  METABOLIC/ELECTROLYTE MANAGEMENT  1.0 POTASSIUM 2.5 CALCIUM DIALYSATE USED WITH HD TODAY.     INTERVENTION:  HEMODIALYSIS ACCESS SITE MANAGEMENT  LEFT IJ TDC ACCESSED USING ASEPTIC TECHNIQUE.     GOAL:  SIGNS AND SYMPTOMS OF LISTED POTENTIAL PROBLEMS WILL BE ABSENT OR MANAGEABLE.     OUTCOME:  PROGRESSING.     HD PLANNED FOR 4 HOURS TODAY.

## 2025-06-27 LAB
BACTERIA SPEC CULT: NORMAL
EKG ATRIAL RATE: 83 BPM
EKG ATRIAL RATE: 90 BPM
EKG ATRIAL RATE: 94 BPM
EKG DIAGNOSIS: NORMAL
EKG P AXIS: 45 DEGREES
EKG P AXIS: 65 DEGREES
EKG P AXIS: 91 DEGREES
EKG P-R INTERVAL: 186 MS
EKG P-R INTERVAL: 186 MS
EKG P-R INTERVAL: 216 MS
EKG Q-T INTERVAL: 382 MS
EKG Q-T INTERVAL: 388 MS
EKG Q-T INTERVAL: 394 MS
EKG QRS DURATION: 102 MS
EKG QRS DURATION: 94 MS
EKG QRS DURATION: 98 MS
EKG QTC CALCULATION (BAZETT): 462 MS
EKG QTC CALCULATION (BAZETT): 467 MS
EKG QTC CALCULATION (BAZETT): 485 MS
EKG R AXIS: -23 DEGREES
EKG R AXIS: -7 DEGREES
EKG R AXIS: 26 DEGREES
EKG T AXIS: 127 DEGREES
EKG T AXIS: 96 DEGREES
EKG T AXIS: 98 DEGREES
EKG VENTRICULAR RATE: 83 BPM
EKG VENTRICULAR RATE: 90 BPM
EKG VENTRICULAR RATE: 94 BPM
SERVICE CMNT-IMP: NORMAL

## (undated) DEVICE — SPONGE HEMOSTAT CELLULS 4X8IN -- SURGICEL

## (undated) DEVICE — BANDAGE COMPR W4INXL5YD ELAS CLP CLSR

## (undated) DEVICE — SUTURE PERMAHAND SZ 3-0 L30IN NONABSORBABLE BLK SILK BRAID A304H

## (undated) DEVICE — SYR 5ML 1/5 GRAD LL NSAF LF --

## (undated) DEVICE — Device

## (undated) DEVICE — SOL IRRIGATION INJ NACL 0.9% 500ML BTL

## (undated) DEVICE — INTRODUCER SHTH 5FR CANN L11CM DIL TIP 25MM GRY TUNGSTEN

## (undated) DEVICE — GARMENT,MEDLINE,DVT,INT,CALF,MED, GEN2: Brand: MEDLINE

## (undated) DEVICE — STERILE POLYISOPRENE POWDER-FREE SURGICAL GLOVES: Brand: PROTEXIS

## (undated) DEVICE — DRAPE,U/ SHT,SPLIT,PLAS,STERIL: Brand: MEDLINE

## (undated) DEVICE — STRAP,POSITIONING,KNEE/BODY,FOAM,4X60": Brand: MEDLINE

## (undated) DEVICE — GAUZE,PACKING STRIP,IODOFORM,1"X5YD,STRL: Brand: CURAD

## (undated) DEVICE — NEEDLE HYPO 25GA L1.5IN BVL ORIENTED ECLIPSE

## (undated) DEVICE — SUTURE PROL SZ 5-0 L36IN NONABSORBABLE BLU L13MM C-1 3/8 8720H

## (undated) DEVICE — APPLIER LIG CLP M L11IN TI STR RNG HNDL FOR 20 CLP DISP

## (undated) DEVICE — APPLIER CLP L9.375IN APER 2.1MM CLS L3.8MM 20 SM TI CLP

## (undated) DEVICE — BANDAGE COMPR W4INXL10YD WHITE/BEIGE E MTRX HK LOOP CLSR

## (undated) DEVICE — SUTURE VCRL SZ 3-0 L27IN ABSRB UD L26MM SH 1/2 CIR J416H

## (undated) DEVICE — GLOVE ORANGE PI 7 1/2   MSG9075

## (undated) DEVICE — SUT PROL 6-0 30IN C1 DA BLU --

## (undated) DEVICE — SUTURE PERMAHAND SZ 2-0 L30IN NONABSORBABLE BLK SILK W/O A305H

## (undated) DEVICE — PACK PROCEDURE SURG EXTREMITY CUST

## (undated) DEVICE — GAUZE PKG STRP IODOFRM 1/4X5YD --

## (undated) DEVICE — DRAPE C-ARMOUR C-ARM KIT --

## (undated) DEVICE — O.R TOWEL, X-RAY DETECTABLE: Brand: DEROYAL

## (undated) DEVICE — SUT ETHLN 2-0 18IN FS BLK --

## (undated) DEVICE — GOWN,AURORA,NONRNF,XL,30/CS: Brand: MEDLINE

## (undated) DEVICE — PAD,ABDOMINAL,5"X9",ST,LF,25/BX: Brand: MEDLINE INDUSTRIES, INC.

## (undated) DEVICE — GLOVE SURG SZ 8 L12IN FNGR THK79MIL GRN LTX FREE

## (undated) DEVICE — NEEDLE HYPO 20GA L1.5IN YEL POLYPR HUB S STL REG BVL STR

## (undated) DEVICE — SPONGE GZ W4XL4IN COT 12 PLY TYP VII WVN C FLD DSGN

## (undated) DEVICE — REM POLYHESIVE ADULT PATIENT RETURN ELECTRODE: Brand: VALLEYLAB

## (undated) DEVICE — MAJOR LITHOTOMY: Brand: MEDLINE INDUSTRIES, INC.

## (undated) DEVICE — 4-PORT MANIFOLD: Brand: NEPTUNE 2

## (undated) DEVICE — STOCKINETTE 2 PLY 4INX48YD -- MEDICHOICE

## (undated) DEVICE — PADDING CAST W4INXL4YD ST COT COHESIVE HND TEARABLE SPEC

## (undated) DEVICE — DERMABOND SKIN ADH 0.7ML --

## (undated) DEVICE — SYR 10ML CTRL LR LCK NSAF LF --

## (undated) DEVICE — BANDAGE,GAUZE,BULKEE II,4.5"X4.1YD,STRL: Brand: MEDLINE

## (undated) DEVICE — PRECISION (9.0 X 0.51 X 25.0MM)

## (undated) DEVICE — BLADE SAW W13XL90MM 1.19MM PARA

## (undated) DEVICE — INTENDED FOR TISSUE SEPARATION, AND OTHER PROCEDURES THAT REQUIRE A SHARP SURGICAL BLADE TO PUNCTURE OR CUT.: Brand: BARD-PARKER ® CARBON RIB-BACK BLADES

## (undated) DEVICE — SOL INJ SOD CL 0.9% 500ML BG --

## (undated) DEVICE — NEEDLE HYPO BLNT 18 GAX1 IN PLAS HUB W/O SYR FILTER SS PNK

## (undated) DEVICE — NEEDLE HYPO 22GA L1.5IN BLK S STL HUB POLYPR SHLD REG BVL

## (undated) DEVICE — DRAPE XR C ARM 41X74IN LF --

## (undated) DEVICE — RESERVOIR,SUCTION,100CC,SILICONE: Brand: MEDLINE

## (undated) DEVICE — SUT PROL 6-0 24IN BV1 DA BLU --

## (undated) DEVICE — SYR 10ML LUER LOK 1/5ML GRAD --

## (undated) DEVICE — SPONGE LAP W18XL18IN WHT COT 4 PLY FLD STRUNG RADPQ DISP ST 2 PER PACK

## (undated) DEVICE — SUT PROL 7-0 24IN BV1 DA BLU --

## (undated) DEVICE — SUTURE MCRYL SZ 4-0 L27IN ABSRB UD L24MM PS-1 3/8 CIR PRIM Y935H

## (undated) DEVICE — DISPOSABLE TOURNIQUET CUFF SINGLE BLADDER, SINGLE PORT AND QUICK CONNECT CONNECTOR: Brand: COLOR CUFF